# Patient Record
Sex: MALE | Race: WHITE | NOT HISPANIC OR LATINO | URBAN - METROPOLITAN AREA
[De-identification: names, ages, dates, MRNs, and addresses within clinical notes are randomized per-mention and may not be internally consistent; named-entity substitution may affect disease eponyms.]

---

## 2017-09-05 ENCOUNTER — INPATIENT (INPATIENT)
Facility: HOSPITAL | Age: 82
LOS: 3 days | Discharge: HOME | DRG: 65 | End: 2017-09-09
Attending: STUDENT IN AN ORGANIZED HEALTH CARE EDUCATION/TRAINING PROGRAM | Admitting: INTERNAL MEDICINE
Payer: MEDICARE

## 2017-09-05 DIAGNOSIS — R51 HEADACHE: ICD-10-CM

## 2017-09-05 DIAGNOSIS — R29.810 FACIAL WEAKNESS: ICD-10-CM

## 2017-09-05 DIAGNOSIS — I27.2 OTHER SECONDARY PULMONARY HYPERTENSION: ICD-10-CM

## 2017-09-05 DIAGNOSIS — Z87.01 PERSONAL HISTORY OF PNEUMONIA (RECURRENT): ICD-10-CM

## 2017-09-05 DIAGNOSIS — T45.516A UNDERDOSING OF ANTICOAGULANTS, INITIAL ENCOUNTER: ICD-10-CM
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
## 2017-09-05 DIAGNOSIS — I48.1 PERSISTENT ATRIAL FIBRILLATION: ICD-10-CM

## 2017-09-05 DIAGNOSIS — F17.290 NICOTINE DEPENDENCE, OTHER TOBACCO PRODUCT, UNCOMPLICATED: ICD-10-CM

## 2017-09-05 DIAGNOSIS — E87.6 HYPOKALEMIA: ICD-10-CM

## 2017-09-05 DIAGNOSIS — Z91.128 PATIENT'S INTENTIONAL UNDERDOSING OF MEDICATION REGIMEN FOR OTHER REASON: ICD-10-CM

## 2017-09-05 DIAGNOSIS — Z79.01 LONG TERM (CURRENT) USE OF ANTICOAGULANTS: ICD-10-CM

## 2017-09-05 DIAGNOSIS — I16.0 HYPERTENSIVE URGENCY: ICD-10-CM

## 2017-09-05 DIAGNOSIS — I50.32 CHRONIC DIASTOLIC (CONGESTIVE) HEART FAILURE: ICD-10-CM

## 2017-09-05 DIAGNOSIS — I11.0 HYPERTENSIVE HEART DISEASE WITH HEART FAILURE: ICD-10-CM

## 2017-09-05 DIAGNOSIS — R00.1 BRADYCARDIA, UNSPECIFIED: ICD-10-CM

## 2017-09-05 DIAGNOSIS — I63.441 CEREBRAL INFARCTION DUE TO EMBOLISM OF RIGHT CEREBELLAR ARTERY: ICD-10-CM

## 2017-09-05 DIAGNOSIS — R40.2411 GLASGOW COMA SCALE SCORE 13-15, IN THE FIELD [EMT OR AMBULANCE]: ICD-10-CM

## 2017-09-05 DIAGNOSIS — E78.5 HYPERLIPIDEMIA, UNSPECIFIED: ICD-10-CM

## 2017-09-05 DIAGNOSIS — R47.1 DYSARTHRIA AND ANARTHRIA: ICD-10-CM

## 2017-09-05 DIAGNOSIS — R27.0 ATAXIA, UNSPECIFIED: ICD-10-CM

## 2017-09-05 DIAGNOSIS — R29.702 NIHSS SCORE 2: ICD-10-CM

## 2017-09-05 DIAGNOSIS — R45.1 RESTLESSNESS AND AGITATION: ICD-10-CM

## 2017-09-05 PROCEDURE — 74000: CPT | Mod: 26

## 2017-09-05 PROCEDURE — 93010 ELECTROCARDIOGRAM REPORT: CPT

## 2017-09-05 PROCEDURE — 70450 CT HEAD/BRAIN W/O DYE: CPT | Mod: 26

## 2017-09-05 PROCEDURE — 99223 1ST HOSP IP/OBS HIGH 75: CPT

## 2017-09-05 PROCEDURE — 71010: CPT | Mod: 26

## 2017-09-06 PROCEDURE — 70450 CT HEAD/BRAIN W/O DYE: CPT | Mod: 26

## 2017-09-06 PROCEDURE — 93880 EXTRACRANIAL BILAT STUDY: CPT | Mod: 26

## 2017-09-06 PROCEDURE — 99233 SBSQ HOSP IP/OBS HIGH 50: CPT

## 2017-09-06 PROCEDURE — 93306 TTE W/DOPPLER COMPLETE: CPT | Mod: 26

## 2017-09-06 PROCEDURE — 99223 1ST HOSP IP/OBS HIGH 75: CPT

## 2017-09-06 PROCEDURE — 93010 ELECTROCARDIOGRAM REPORT: CPT

## 2017-09-07 PROCEDURE — 99233 SBSQ HOSP IP/OBS HIGH 50: CPT

## 2017-09-07 PROCEDURE — 93010 ELECTROCARDIOGRAM REPORT: CPT

## 2017-09-08 PROCEDURE — 99233 SBSQ HOSP IP/OBS HIGH 50: CPT

## 2017-09-08 PROCEDURE — 99221 1ST HOSP IP/OBS SF/LOW 40: CPT

## 2017-09-09 ENCOUNTER — INPATIENT (INPATIENT)
Facility: HOSPITAL | Age: 82
LOS: 19 days | Discharge: HOME HEALTH | DRG: 950 | End: 2017-09-29
Attending: PSYCHIATRY & NEUROLOGY | Admitting: PSYCHIATRY & NEUROLOGY
Payer: MEDICARE

## 2017-09-09 DIAGNOSIS — I63.9 CEREBRAL INFARCTION, UNSPECIFIED: ICD-10-CM

## 2017-09-09 DIAGNOSIS — I69.390 APRAXIA FOLLOWING CEREBRAL INFARCTION: ICD-10-CM

## 2017-09-09 DIAGNOSIS — G47.00 INSOMNIA, UNSPECIFIED: ICD-10-CM

## 2017-09-09 DIAGNOSIS — R42 DIZZINESS AND GIDDINESS: ICD-10-CM

## 2017-09-09 DIAGNOSIS — R13.10 DYSPHAGIA, UNSPECIFIED: ICD-10-CM

## 2017-09-09 DIAGNOSIS — E78.5 HYPERLIPIDEMIA, UNSPECIFIED: ICD-10-CM

## 2017-09-09 DIAGNOSIS — K13.79 OTHER LESIONS OF ORAL MUCOSA: ICD-10-CM

## 2017-09-09 DIAGNOSIS — E78.00 PURE HYPERCHOLESTEROLEMIA, UNSPECIFIED: ICD-10-CM

## 2017-09-09 DIAGNOSIS — I10 ESSENTIAL (PRIMARY) HYPERTENSION: ICD-10-CM

## 2017-09-09 DIAGNOSIS — Z51.89 ENCOUNTER FOR OTHER SPECIFIED AFTERCARE: ICD-10-CM

## 2017-09-09 DIAGNOSIS — I50.9 HEART FAILURE, UNSPECIFIED: ICD-10-CM

## 2017-09-09 DIAGNOSIS — I69.393 ATAXIA FOLLOWING CEREBRAL INFARCTION: ICD-10-CM

## 2017-09-09 DIAGNOSIS — I69.391 DYSPHAGIA FOLLOWING CEREBRAL INFARCTION: ICD-10-CM

## 2017-09-09 DIAGNOSIS — I69.322 DYSARTHRIA FOLLOWING CEREBRAL INFARCTION: ICD-10-CM

## 2017-09-09 DIAGNOSIS — I48.91 UNSPECIFIED ATRIAL FIBRILLATION: ICD-10-CM

## 2017-09-10 PROCEDURE — 99223 1ST HOSP IP/OBS HIGH 75: CPT | Mod: GC

## 2017-09-11 PROCEDURE — 99232 SBSQ HOSP IP/OBS MODERATE 35: CPT

## 2017-09-12 PROCEDURE — 96116 NUBHVL XM PHYS/QHP 1ST HR: CPT

## 2017-09-12 PROCEDURE — 99232 SBSQ HOSP IP/OBS MODERATE 35: CPT

## 2017-09-13 PROCEDURE — 99232 SBSQ HOSP IP/OBS MODERATE 35: CPT

## 2017-09-14 PROCEDURE — 99232 SBSQ HOSP IP/OBS MODERATE 35: CPT

## 2017-09-14 PROCEDURE — 99223 1ST HOSP IP/OBS HIGH 75: CPT

## 2017-09-15 PROCEDURE — 99232 SBSQ HOSP IP/OBS MODERATE 35: CPT

## 2017-09-16 PROCEDURE — 99232 SBSQ HOSP IP/OBS MODERATE 35: CPT

## 2017-09-17 PROCEDURE — 99232 SBSQ HOSP IP/OBS MODERATE 35: CPT

## 2017-09-18 PROCEDURE — 99232 SBSQ HOSP IP/OBS MODERATE 35: CPT

## 2017-09-19 PROCEDURE — 99232 SBSQ HOSP IP/OBS MODERATE 35: CPT

## 2017-09-20 PROCEDURE — 99232 SBSQ HOSP IP/OBS MODERATE 35: CPT

## 2017-09-21 PROCEDURE — 99232 SBSQ HOSP IP/OBS MODERATE 35: CPT

## 2017-09-22 PROCEDURE — 99232 SBSQ HOSP IP/OBS MODERATE 35: CPT

## 2017-09-23 PROCEDURE — 99232 SBSQ HOSP IP/OBS MODERATE 35: CPT

## 2017-09-24 PROCEDURE — 99232 SBSQ HOSP IP/OBS MODERATE 35: CPT

## 2017-09-25 PROCEDURE — 99232 SBSQ HOSP IP/OBS MODERATE 35: CPT

## 2017-09-26 PROCEDURE — 99232 SBSQ HOSP IP/OBS MODERATE 35: CPT

## 2017-09-27 PROCEDURE — 99232 SBSQ HOSP IP/OBS MODERATE 35: CPT

## 2017-09-28 PROCEDURE — 99232 SBSQ HOSP IP/OBS MODERATE 35: CPT

## 2017-09-29 PROCEDURE — 99239 HOSP IP/OBS DSCHRG MGMT >30: CPT

## 2017-10-04 PROCEDURE — 83735 ASSAY OF MAGNESIUM: CPT

## 2017-10-04 PROCEDURE — G0515: CPT

## 2017-10-04 PROCEDURE — 85610 PROTHROMBIN TIME: CPT

## 2017-10-04 PROCEDURE — 80053 COMPREHEN METABOLIC PANEL: CPT

## 2017-10-04 PROCEDURE — 92507 TX SP LANG VOICE COMM INDIV: CPT

## 2017-10-04 PROCEDURE — 97116 GAIT TRAINING THERAPY: CPT

## 2017-10-04 PROCEDURE — 92610 EVALUATE SWALLOWING FUNCTION: CPT

## 2017-10-04 PROCEDURE — 80076 HEPATIC FUNCTION PANEL: CPT

## 2017-10-04 PROCEDURE — 82248 BILIRUBIN DIRECT: CPT

## 2017-10-04 PROCEDURE — 92523 SPEECH SOUND LANG COMPREHEN: CPT

## 2017-10-04 PROCEDURE — 97112 NEUROMUSCULAR REEDUCATION: CPT

## 2017-10-04 PROCEDURE — 85025 COMPLETE CBC W/AUTO DIFF WBC: CPT

## 2017-10-04 PROCEDURE — 97530 THERAPEUTIC ACTIVITIES: CPT

## 2017-10-04 PROCEDURE — 97535 SELF CARE MNGMENT TRAINING: CPT

## 2017-10-04 PROCEDURE — 97110 THERAPEUTIC EXERCISES: CPT

## 2017-10-04 PROCEDURE — 85027 COMPLETE CBC AUTOMATED: CPT

## 2017-10-04 PROCEDURE — 80048 BASIC METABOLIC PNL TOTAL CA: CPT

## 2017-10-19 PROCEDURE — 80053 COMPREHEN METABOLIC PANEL: CPT

## 2017-10-19 PROCEDURE — 80069 RENAL FUNCTION PANEL: CPT

## 2017-10-19 PROCEDURE — 93306 TTE W/DOPPLER COMPLETE: CPT

## 2017-10-19 PROCEDURE — 71045 X-RAY EXAM CHEST 1 VIEW: CPT

## 2017-10-19 PROCEDURE — 84484 ASSAY OF TROPONIN QUANT: CPT

## 2017-10-19 PROCEDURE — 85027 COMPLETE CBC AUTOMATED: CPT

## 2017-10-19 PROCEDURE — 92526 ORAL FUNCTION THERAPY: CPT

## 2017-10-19 PROCEDURE — 96376 TX/PRO/DX INJ SAME DRUG ADON: CPT

## 2017-10-19 PROCEDURE — 70450 CT HEAD/BRAIN W/O DYE: CPT

## 2017-10-19 PROCEDURE — 83690 ASSAY OF LIPASE: CPT

## 2017-10-19 PROCEDURE — 82150 ASSAY OF AMYLASE: CPT

## 2017-10-19 PROCEDURE — 80061 LIPID PANEL: CPT

## 2017-10-19 PROCEDURE — 96374 THER/PROPH/DIAG INJ IV PUSH: CPT

## 2017-10-19 PROCEDURE — 80048 BASIC METABOLIC PNL TOTAL CA: CPT

## 2017-10-19 PROCEDURE — 92610 EVALUATE SWALLOWING FUNCTION: CPT

## 2017-10-19 PROCEDURE — 83605 ASSAY OF LACTIC ACID: CPT

## 2017-10-19 PROCEDURE — 87040 BLOOD CULTURE FOR BACTERIA: CPT

## 2017-10-19 PROCEDURE — 83880 ASSAY OF NATRIURETIC PEPTIDE: CPT

## 2017-10-19 PROCEDURE — 82553 CREATINE MB FRACTION: CPT

## 2017-10-19 PROCEDURE — 87086 URINE CULTURE/COLONY COUNT: CPT

## 2017-10-19 PROCEDURE — 82550 ASSAY OF CK (CPK): CPT

## 2017-10-19 PROCEDURE — 74018 RADEX ABDOMEN 1 VIEW: CPT

## 2017-10-19 PROCEDURE — 83735 ASSAY OF MAGNESIUM: CPT

## 2017-10-19 PROCEDURE — 93880 EXTRACRANIAL BILAT STUDY: CPT

## 2017-10-19 PROCEDURE — 85025 COMPLETE CBC W/AUTO DIFF WBC: CPT

## 2017-10-19 PROCEDURE — 97162 PT EVAL MOD COMPLEX 30 MIN: CPT

## 2017-10-19 PROCEDURE — 97116 GAIT TRAINING THERAPY: CPT

## 2017-10-19 PROCEDURE — 81003 URINALYSIS AUTO W/O SCOPE: CPT

## 2017-10-19 PROCEDURE — 99285 EMERGENCY DEPT VISIT HI MDM: CPT

## 2017-10-19 PROCEDURE — 96375 TX/PRO/DX INJ NEW DRUG ADDON: CPT

## 2017-10-19 PROCEDURE — 85610 PROTHROMBIN TIME: CPT

## 2017-10-19 PROCEDURE — 85730 THROMBOPLASTIN TIME PARTIAL: CPT

## 2017-10-19 PROCEDURE — 93005 ELECTROCARDIOGRAM TRACING: CPT

## 2017-11-03 ENCOUNTER — APPOINTMENT (OUTPATIENT)
Dept: CT IMAGING | Facility: HOSPITAL | Age: 82
End: 2017-11-03

## 2017-11-03 ENCOUNTER — OUTPATIENT (OUTPATIENT)
Dept: OUTPATIENT SERVICES | Facility: HOSPITAL | Age: 82
LOS: 1 days | End: 2017-11-03
Payer: MEDICARE

## 2017-11-03 DIAGNOSIS — I63.9 CEREBRAL INFARCTION, UNSPECIFIED: ICD-10-CM

## 2017-11-03 DIAGNOSIS — Z00.8 ENCOUNTER FOR OTHER GENERAL EXAMINATION: ICD-10-CM

## 2017-11-03 PROCEDURE — 70450 CT HEAD/BRAIN W/O DYE: CPT

## 2017-11-03 PROCEDURE — 70450 CT HEAD/BRAIN W/O DYE: CPT | Mod: 26

## 2017-11-09 ENCOUNTER — APPOINTMENT (OUTPATIENT)
Dept: INTERNAL MEDICINE | Facility: CLINIC | Age: 82
End: 2017-11-09

## 2018-08-20 ENCOUNTER — INPATIENT (INPATIENT)
Facility: HOSPITAL | Age: 83
LOS: 1 days | Discharge: HOME | DRG: 280 | End: 2018-08-22
Attending: STUDENT IN AN ORGANIZED HEALTH CARE EDUCATION/TRAINING PROGRAM | Admitting: FAMILY MEDICINE
Payer: MEDICARE

## 2018-08-20 VITALS
HEART RATE: 78 BPM | SYSTOLIC BLOOD PRESSURE: 193 MMHG | WEIGHT: 175.05 LBS | TEMPERATURE: 98 F | DIASTOLIC BLOOD PRESSURE: 80 MMHG | HEIGHT: 67 IN | RESPIRATION RATE: 17 BRPM | OXYGEN SATURATION: 96 %

## 2018-08-20 DIAGNOSIS — I50.9 HEART FAILURE, UNSPECIFIED: ICD-10-CM

## 2018-08-20 DIAGNOSIS — R05 COUGH: ICD-10-CM

## 2018-08-20 DIAGNOSIS — R74.8 ABNORMAL LEVELS OF OTHER SERUM ENZYMES: ICD-10-CM

## 2018-08-20 DIAGNOSIS — I48.2 CHRONIC ATRIAL FIBRILLATION: ICD-10-CM

## 2018-08-20 DIAGNOSIS — J18.9 PNEUMONIA, UNSPECIFIED ORGANISM: ICD-10-CM

## 2018-08-20 DIAGNOSIS — I10 ESSENTIAL (PRIMARY) HYPERTENSION: ICD-10-CM

## 2018-08-20 DIAGNOSIS — I63.9 CEREBRAL INFARCTION, UNSPECIFIED: ICD-10-CM

## 2018-08-20 LAB
ALBUMIN SERPL ELPH-MCNC: 3.4 G/DL — SIGNIFICANT CHANGE UP (ref 3.3–5)
ALP SERPL-CCNC: 51 U/L — SIGNIFICANT CHANGE UP (ref 40–120)
ALT FLD-CCNC: 22 U/L DA — SIGNIFICANT CHANGE UP (ref 10–45)
ANION GAP SERPL CALC-SCNC: 7 MMOL/L — SIGNIFICANT CHANGE UP (ref 5–17)
APTT BLD: 29.3 SEC — SIGNIFICANT CHANGE UP (ref 27.5–37.4)
AST SERPL-CCNC: 21 U/L — SIGNIFICANT CHANGE UP (ref 10–40)
BILIRUB SERPL-MCNC: 0.7 MG/DL — SIGNIFICANT CHANGE UP (ref 0.2–1.2)
BUN SERPL-MCNC: 16 MG/DL — SIGNIFICANT CHANGE UP (ref 7–23)
CALCIUM SERPL-MCNC: 8.9 MG/DL — SIGNIFICANT CHANGE UP (ref 8.4–10.5)
CHLORIDE SERPL-SCNC: 106 MMOL/L — SIGNIFICANT CHANGE UP (ref 96–108)
CK MB BLD-MCNC: 2 % — SIGNIFICANT CHANGE UP (ref 0–3.5)
CK MB CFR SERPL CALC: 1 NG/ML — SIGNIFICANT CHANGE UP (ref 0.5–10)
CK SERPL-CCNC: 51 U/L — SIGNIFICANT CHANGE UP (ref 30–200)
CO2 SERPL-SCNC: 27 MMOL/L — SIGNIFICANT CHANGE UP (ref 22–31)
CREAT SERPL-MCNC: 0.92 MG/DL — SIGNIFICANT CHANGE UP (ref 0.5–1.3)
D DIMER BLD IA.RAPID-MCNC: 920 NG/ML DDU — HIGH
ETHANOL SERPL-MCNC: <3 MG/DL — SIGNIFICANT CHANGE UP (ref 0–3)
GLUCOSE SERPL-MCNC: 190 MG/DL — HIGH (ref 70–99)
HCT VFR BLD CALC: 42.4 % — SIGNIFICANT CHANGE UP (ref 39–50)
HGB BLD-MCNC: 13.8 G/DL — SIGNIFICANT CHANGE UP (ref 13–17)
INR BLD: 1.09 RATIO — SIGNIFICANT CHANGE UP (ref 0.88–1.16)
MCHC RBC-ENTMCNC: 32.2 PG — SIGNIFICANT CHANGE UP (ref 27–34)
MCHC RBC-ENTMCNC: 32.6 GM/DL — SIGNIFICANT CHANGE UP (ref 32–36)
MCV RBC AUTO: 98.7 FL — SIGNIFICANT CHANGE UP (ref 80–100)
NT-PROBNP SERPL-SCNC: 1627 PG/ML — HIGH (ref 0–300)
PLATELET # BLD AUTO: 170 K/UL — SIGNIFICANT CHANGE UP (ref 150–400)
POTASSIUM SERPL-MCNC: 3.7 MMOL/L — SIGNIFICANT CHANGE UP (ref 3.5–5.3)
POTASSIUM SERPL-SCNC: 3.7 MMOL/L — SIGNIFICANT CHANGE UP (ref 3.5–5.3)
PROT SERPL-MCNC: 7.1 G/DL — SIGNIFICANT CHANGE UP (ref 6–8.3)
PROTHROM AB SERPL-ACNC: 12.1 SEC — SIGNIFICANT CHANGE UP (ref 9.8–12.7)
RBC # BLD: 4.3 M/UL — SIGNIFICANT CHANGE UP (ref 4.2–5.8)
RBC # FLD: 13.6 % — SIGNIFICANT CHANGE UP (ref 10.3–14.5)
SODIUM SERPL-SCNC: 140 MMOL/L — SIGNIFICANT CHANGE UP (ref 135–145)
TROPONIN I SERPL-MCNC: 0.1 NG/ML — HIGH (ref 0.02–0.06)
WBC # BLD: 11.2 K/UL — HIGH (ref 3.8–10.5)
WBC # FLD AUTO: 11.2 K/UL — HIGH (ref 3.8–10.5)

## 2018-08-20 PROCEDURE — 99285 EMERGENCY DEPT VISIT HI MDM: CPT

## 2018-08-20 PROCEDURE — 99223 1ST HOSP IP/OBS HIGH 75: CPT | Mod: AI

## 2018-08-20 PROCEDURE — 93010 ELECTROCARDIOGRAM REPORT: CPT

## 2018-08-20 PROCEDURE — 71275 CT ANGIOGRAPHY CHEST: CPT | Mod: 26

## 2018-08-20 PROCEDURE — 71045 X-RAY EXAM CHEST 1 VIEW: CPT | Mod: 26

## 2018-08-20 RX ORDER — ACETAMINOPHEN 500 MG
650 TABLET ORAL EVERY 6 HOURS
Qty: 0 | Refills: 0 | Status: DISCONTINUED | OUTPATIENT
Start: 2018-08-20 | End: 2018-08-22

## 2018-08-20 RX ORDER — CEFTRIAXONE 500 MG/1
1 INJECTION, POWDER, FOR SOLUTION INTRAMUSCULAR; INTRAVENOUS EVERY 24 HOURS
Qty: 0 | Refills: 0 | Status: DISCONTINUED | OUTPATIENT
Start: 2018-08-20 | End: 2018-08-22

## 2018-08-20 RX ORDER — IPRATROPIUM/ALBUTEROL SULFATE 18-103MCG
3 AEROSOL WITH ADAPTER (GRAM) INHALATION ONCE
Qty: 0 | Refills: 0 | Status: COMPLETED | OUTPATIENT
Start: 2018-08-20 | End: 2018-08-20

## 2018-08-20 RX ORDER — LISINOPRIL 2.5 MG/1
5 TABLET ORAL DAILY
Qty: 0 | Refills: 0 | Status: DISCONTINUED | OUTPATIENT
Start: 2018-08-20 | End: 2018-08-22

## 2018-08-20 RX ORDER — ASPIRIN/CALCIUM CARB/MAGNESIUM 324 MG
81 TABLET ORAL DAILY
Qty: 0 | Refills: 0 | Status: DISCONTINUED | OUTPATIENT
Start: 2018-08-20 | End: 2018-08-22

## 2018-08-20 RX ORDER — FUROSEMIDE 40 MG
40 TABLET ORAL ONCE
Qty: 0 | Refills: 0 | Status: COMPLETED | OUTPATIENT
Start: 2018-08-20 | End: 2018-08-20

## 2018-08-20 RX ORDER — AZITHROMYCIN 500 MG/1
500 TABLET, FILM COATED ORAL EVERY 24 HOURS
Qty: 0 | Refills: 0 | Status: DISCONTINUED | OUTPATIENT
Start: 2018-08-20 | End: 2018-08-22

## 2018-08-20 RX ORDER — BUDESONIDE, MICRONIZED 100 %
0.5 POWDER (GRAM) MISCELLANEOUS EVERY 12 HOURS
Qty: 0 | Refills: 0 | Status: DISCONTINUED | OUTPATIENT
Start: 2018-08-20 | End: 2018-08-22

## 2018-08-20 RX ORDER — ISOSORBIDE DINITRATE 5 MG/1
20 TABLET ORAL THREE TIMES A DAY
Qty: 0 | Refills: 0 | Status: DISCONTINUED | OUTPATIENT
Start: 2018-08-20 | End: 2018-08-22

## 2018-08-20 RX ORDER — FUROSEMIDE 40 MG
40 TABLET ORAL DAILY
Qty: 0 | Refills: 0 | Status: DISCONTINUED | OUTPATIENT
Start: 2018-08-20 | End: 2018-08-22

## 2018-08-20 RX ORDER — ALBUTEROL 90 UG/1
2.5 AEROSOL, METERED ORAL ONCE
Qty: 0 | Refills: 0 | Status: COMPLETED | OUTPATIENT
Start: 2018-08-20 | End: 2018-08-20

## 2018-08-20 RX ORDER — APIXABAN 2.5 MG/1
5 TABLET, FILM COATED ORAL EVERY 12 HOURS
Qty: 0 | Refills: 0 | Status: DISCONTINUED | OUTPATIENT
Start: 2018-08-20 | End: 2018-08-22

## 2018-08-20 RX ADMIN — Medication 40 MILLIGRAM(S): at 20:30

## 2018-08-20 RX ADMIN — ALBUTEROL 2.5 MILLIGRAM(S): 90 AEROSOL, METERED ORAL at 19:00

## 2018-08-20 RX ADMIN — Medication 3 MILLILITER(S): at 19:00

## 2018-08-20 NOTE — ED PROVIDER NOTE - NS ED ROS FT
Except as otherwise indicated in HPI:  CONSTITUTIONAL: Neg  HEENT: neg  CV: no cp, no palp  Resp: +sob, +cough, no resp distress  GI: Neg  : Neg  MSK: +leg swelling  SKIN: Neg  NEURO: Neg  PSYCHIATRIC: Neg

## 2018-08-20 NOTE — ED ADULT NURSE NOTE - NSIMPLEMENTINTERV_GEN_ALL_ED
Implemented All Fall Risk Interventions:  [place] to call system. Call bell, personal items and telephone within reach. Instruct patient to call for assistance. Room bathroom lighting operational. Non-slip footwear when patient is off stretcher. Physically safe environment: no spills, clutter or unnecessary equipment. Stretcher in lowest position, wheels locked, appropriate side rails in place. Provide visual cue, wrist band, yellow gown, etc. Monitor gait and stability. Monitor for mental status changes and reorient to person, place, and time. Review medications for side effects contributing to fall risk. Reinforce activity limits and safety measures with patient and family.

## 2018-08-20 NOTE — H&P ADULT - PROBLEM SELECTOR PLAN 7
bilateral basilar opacity, ? ATYPICAL PNA, OR  distal impaction of mucus- will treat imperially for pna, with IV antibiotics, AZITHROMICIN/ ROCEPHINE.

## 2018-08-20 NOTE — ED ADULT NURSE NOTE - OBJECTIVE STATEMENT
84 yr old male to ED with SOB, and productive cough with white sputum x wks. Pt with hx-CVA . +Residual left sided weakness and slurred speech. As per family pt has been intubated in the past.  PERRL @ 3mm. A&Ox3, +Rhonchi noted with auscultation.  Resp non-labored. Abd soft, NT, ND. +BS. +PP. RAMOS. Skin warm, dry and intact 84 yr old male to ED with SOB, and productive cough with white sputum x wks. Pt with hx-CVA . +Residual left sided weakness and slurred speech. As per family pt has been intubated in the past.  Denies Fevers or chills. No chest pain. No edema noted. PERRL @ 3mm. A&Ox3, +Rhonchi noted with auscultation.  Resp non-labored. Abd soft, NT, ND. +BS. +PP. RAMOS. Skin warm, dry and intact 84 yr old male to ED with SOB, and productive cough with white sputum x wks. Pt with hx-CVA . +Residual left sided weakness and slurred speech. As per family pt has been intubated in the past.  Denies Fevers or chills. No chest pain. No edema noted. PERRL @ 3mm. A&Ox3, +crackles noted with auscultation.  Resp non-labored. Abd soft, NT, ND. +BS. +PP. RAMOS. Skin warm, dry and intact

## 2018-08-20 NOTE — ED PROVIDER NOTE - OBJECTIVE STATEMENT
Pt with hx cva (mild chronic left sided weakness and speech slurred) Pt with hx cva (mild chronic left sided weakness and speech slurred), chf, afib. Pt c/o 1 week hx sob, cough with white sputum, no fever no chills. no cp.  no back pain. has leg swelling, states slightly increased from baseline. No abd pain nausea or emesis.

## 2018-08-20 NOTE — H&P ADULT - HISTORY OF PRESENT ILLNESS
84 y o m Pt with hx cva (mild chronic left sided weakness and speech slurred), chf, afib. Pt c/o 1 week hx sob, cough with white sputum, no fever no chills. no cp.  no back pain. has leg swelling, states slightly increased from baseline. No abd pain nausea. noted to have mild elevation in trops, 84 y o m Pt with hx cva , chf, afib. Pt c/o 1 week hx sob, cough with white sputum, sob has been worsening for the last 1- 2 weeks, aggravated by exertion,  no fever no chills. no cp.  no back pain. has leg swelling, states slightly increased from baseline. No abd pain nausea. noted to have mild elevation in trops,

## 2018-08-20 NOTE — H&P ADULT - PROBLEM SELECTOR PLAN 1
admit to tele, cardiac monitoring, serial trops,   c/w lasix ivp, o2 support, dvt ppx, ed echo in am.

## 2018-08-20 NOTE — H&P ADULT - NSHPLABSRESULTS_GEN_ALL_CORE
x                    140  | 27   | 16           x     >-----------< x       ------------------------< 190                   x                    3.7  | 106  | 0.92                                         Ca 8.9   Mg x     Ph x            PT/INR - ( 20 Aug 2018 19:15 )   PT: 12.1 sec;   INR: 1.09 ratio         PTT - ( 20 Aug 2018 19:15 )  PTT:29.3 sec    CARDIAC MARKERS ( 20 Aug 2018 19:15 )  CK51 U/L / CKMB1.0 ng/mL / Troponin - 0.09    Labs reviewed:     CXR personally reviewed: pulmonary vascular congestion    ECG reviewed and interpreted: afib 72    CTA chest- pulmonary vascular congestion, pleural effusions, bilateral basilar opacity, ? ATYPICAL PNA, OR  distal impaction of mucus , no OBVIOUS PE, D/W RADIOLOGY

## 2018-08-20 NOTE — H&P ADULT - PMH
Afib    Congestive heart failure (CHF)    CVA (cerebral vascular accident)    Hypertension, unspecified type

## 2018-08-20 NOTE — H&P ADULT - NSHPPHYSICALEXAM_GEN_ALL_CORE
Vital Signs Last 24 Hrs  T(C): 36.8 (20 Aug 2018 18:41), Max: 36.8 (20 Aug 2018 18:41)  T(F): 98.3 (20 Aug 2018 18:41), Max: 98.3 (20 Aug 2018 18:41)  HR: 80 (20 Aug 2018 21:40) (78 - 81)  BP: 183/73 (20 Aug 2018 21:40) (179/68 - 193/80)  BP(mean): --  RR: 22 (20 Aug 2018 21:40) (17 - 24)  SpO2: 96% (20 Aug 2018 20:08) (96% - 96%)    coughing spells, ncat, rocco, mmm  supple  rales atr base  s1s2  sof alec bs present  mild LE edema  no gross neuro defixcits  aao x 3 Vital Signs Last 24 Hrs  T(C): 36.8 (20 Aug 2018 18:41), Max: 36.8 (20 Aug 2018 18:41)  T(F): 98.3 (20 Aug 2018 18:41), Max: 98.3 (20 Aug 2018 18:41)  HR: 80 (20 Aug 2018 21:40) (78 - 81)  BP: 183/73 (20 Aug 2018 21:40) (179/68 - 193/80)  BP(mean): --  RR: 22 (20 Aug 2018 21:40) (17 - 24)  SpO2: 96% (20 Aug 2018 20:08) (96% - 96%)    coughing spells, ncat, rocco, mmm  supple  rales atr base  s1s2  sof alec bs present  mild LE edema  slurred speech, no new  deficits  aao x 3

## 2018-08-20 NOTE — ED PROVIDER NOTE - PHYSICAL EXAMINATION
Gen:  alert, awake, no acute distress  Head:  atraumatic, normocephalic  HEENT: PERRLA, EOMI, normal nose, normal oropharynx, no tonsillar edema, erythema, or exudate  CV:  rrr, nl S1, S2, +systolic murmur  Pulm:  crackles 1/3 up b/l bs  Abd: s/nt/nd, +BS  MSK:  moving all extremities, no back midline ttp, no stepoffs, no cva TTP; 1-2+ symmetric b/l lower extremity edema  Neuro:  grossly intact, no focal deficits  Skin:  clear, dry, intact  Psych: AOx3, normal affect, no apparent risk to self or others Gen:  alert, awake, no acute distress  Head:  atraumatic, normocephalic  HEENT: PERRLA, EOMI, normal nose, normal oropharynx, no tonsillar edema, erythema, or exudate  CV:  irregularly irregular  Pulm:  crackles 1/3 up b/l bs  Abd: s/nt/nd, +BS  MSK:  moving all extremities, no back midline ttp, no stepoffs, no cva TTP; 1-2+ symmetric b/l lower extremity edema  Neuro:  grossly intact, no focal deficits  Skin:  clear, dry, intact  Psych: AOx3, normal affect, no apparent risk to self or others

## 2018-08-21 LAB
ALBUMIN SERPL ELPH-MCNC: 3.3 G/DL — SIGNIFICANT CHANGE UP (ref 3.3–5)
ALP SERPL-CCNC: 50 U/L — SIGNIFICANT CHANGE UP (ref 40–120)
ALT FLD-CCNC: 18 U/L DA — SIGNIFICANT CHANGE UP (ref 10–45)
ANION GAP SERPL CALC-SCNC: 6 MMOL/L — SIGNIFICANT CHANGE UP (ref 5–17)
AST SERPL-CCNC: 20 U/L — SIGNIFICANT CHANGE UP (ref 10–40)
BASOPHILS # BLD AUTO: 0.1 K/UL — SIGNIFICANT CHANGE UP (ref 0–0.2)
BASOPHILS NFR BLD AUTO: 0.6 % — SIGNIFICANT CHANGE UP (ref 0–2)
BILIRUB SERPL-MCNC: 0.5 MG/DL — SIGNIFICANT CHANGE UP (ref 0.2–1.2)
BUN SERPL-MCNC: 20 MG/DL — SIGNIFICANT CHANGE UP (ref 7–23)
CALCIUM SERPL-MCNC: 8.6 MG/DL — SIGNIFICANT CHANGE UP (ref 8.4–10.5)
CHLORIDE SERPL-SCNC: 101 MMOL/L — SIGNIFICANT CHANGE UP (ref 96–108)
CO2 SERPL-SCNC: 33 MMOL/L — HIGH (ref 22–31)
CREAT SERPL-MCNC: 1.13 MG/DL — SIGNIFICANT CHANGE UP (ref 0.5–1.3)
EOSINOPHIL # BLD AUTO: 0.7 K/UL — HIGH (ref 0–0.5)
EOSINOPHIL NFR BLD AUTO: 6.2 % — HIGH (ref 0–6)
GLUCOSE SERPL-MCNC: 100 MG/DL — HIGH (ref 70–99)
HCT VFR BLD CALC: 42.4 % — SIGNIFICANT CHANGE UP (ref 39–50)
HGB BLD-MCNC: 13.9 G/DL — SIGNIFICANT CHANGE UP (ref 13–17)
LYMPHOCYTES # BLD AUTO: 1.9 K/UL — SIGNIFICANT CHANGE UP (ref 1–3.3)
LYMPHOCYTES # BLD AUTO: 16.4 % — SIGNIFICANT CHANGE UP (ref 13–44)
MCHC RBC-ENTMCNC: 32.6 PG — SIGNIFICANT CHANGE UP (ref 27–34)
MCHC RBC-ENTMCNC: 32.8 GM/DL — SIGNIFICANT CHANGE UP (ref 32–36)
MCV RBC AUTO: 99.5 FL — SIGNIFICANT CHANGE UP (ref 80–100)
MONOCYTES # BLD AUTO: 1.2 K/UL — HIGH (ref 0–0.9)
MONOCYTES NFR BLD AUTO: 10.5 % — HIGH (ref 1–9)
NEUTROPHILS # BLD AUTO: 7.5 K/UL — HIGH (ref 1.8–7.4)
NEUTROPHILS NFR BLD AUTO: 66.3 % — SIGNIFICANT CHANGE UP (ref 43–77)
NT-PROBNP SERPL-SCNC: 1306 PG/ML — HIGH (ref 0–300)
PLATELET # BLD AUTO: 164 K/UL — SIGNIFICANT CHANGE UP (ref 150–400)
POTASSIUM SERPL-MCNC: 3.6 MMOL/L — SIGNIFICANT CHANGE UP (ref 3.5–5.3)
POTASSIUM SERPL-SCNC: 3.6 MMOL/L — SIGNIFICANT CHANGE UP (ref 3.5–5.3)
PROT SERPL-MCNC: 7 G/DL — SIGNIFICANT CHANGE UP (ref 6–8.3)
RBC # BLD: 4.26 M/UL — SIGNIFICANT CHANGE UP (ref 4.2–5.8)
RBC # FLD: 13.2 % — SIGNIFICANT CHANGE UP (ref 10.3–14.5)
SODIUM SERPL-SCNC: 140 MMOL/L — SIGNIFICANT CHANGE UP (ref 135–145)
TROPONIN I SERPL-MCNC: 0.09 NG/ML — HIGH (ref 0.02–0.06)
TROPONIN I SERPL-MCNC: 0.11 NG/ML — HIGH (ref 0.02–0.06)
TROPONIN I SERPL-MCNC: 0.12 NG/ML — HIGH (ref 0.02–0.06)
WBC # BLD: 11.4 K/UL — HIGH (ref 3.8–10.5)
WBC # FLD AUTO: 11.4 K/UL — HIGH (ref 3.8–10.5)

## 2018-08-21 PROCEDURE — 74018 RADEX ABDOMEN 1 VIEW: CPT | Mod: 26

## 2018-08-21 PROCEDURE — 93306 TTE W/DOPPLER COMPLETE: CPT | Mod: 26

## 2018-08-21 PROCEDURE — 99233 SBSQ HOSP IP/OBS HIGH 50: CPT

## 2018-08-21 RX ADMIN — ISOSORBIDE DINITRATE 20 MILLIGRAM(S): 5 TABLET ORAL at 21:29

## 2018-08-21 RX ADMIN — LISINOPRIL 5 MILLIGRAM(S): 2.5 TABLET ORAL at 00:09

## 2018-08-21 RX ADMIN — CEFTRIAXONE 100 GRAM(S): 500 INJECTION, POWDER, FOR SOLUTION INTRAMUSCULAR; INTRAVENOUS at 05:15

## 2018-08-21 RX ADMIN — Medication 2.5 MILLIGRAM(S): at 00:05

## 2018-08-21 RX ADMIN — Medication 81 MILLIGRAM(S): at 12:56

## 2018-08-21 RX ADMIN — AZITHROMYCIN 255 MILLIGRAM(S): 500 TABLET, FILM COATED ORAL at 06:45

## 2018-08-21 RX ADMIN — ISOSORBIDE DINITRATE 20 MILLIGRAM(S): 5 TABLET ORAL at 14:07

## 2018-08-21 RX ADMIN — APIXABAN 5 MILLIGRAM(S): 2.5 TABLET, FILM COATED ORAL at 05:15

## 2018-08-21 RX ADMIN — LISINOPRIL 5 MILLIGRAM(S): 2.5 TABLET ORAL at 05:15

## 2018-08-21 RX ADMIN — Medication 0.5 MILLIGRAM(S): at 16:17

## 2018-08-21 RX ADMIN — ISOSORBIDE DINITRATE 20 MILLIGRAM(S): 5 TABLET ORAL at 00:09

## 2018-08-21 RX ADMIN — ISOSORBIDE DINITRATE 20 MILLIGRAM(S): 5 TABLET ORAL at 05:15

## 2018-08-21 RX ADMIN — APIXABAN 5 MILLIGRAM(S): 2.5 TABLET, FILM COATED ORAL at 00:09

## 2018-08-21 RX ADMIN — Medication 40 MILLIGRAM(S): at 05:15

## 2018-08-21 RX ADMIN — APIXABAN 5 MILLIGRAM(S): 2.5 TABLET, FILM COATED ORAL at 18:19

## 2018-08-21 NOTE — PROGRESS NOTE ADULT - SUBJECTIVE AND OBJECTIVE BOX
Patient is a 84 year old  Male who presents with a chief complaint of shortness of breath. (20 Aug 2018 21:54)    Patient with hx cva, chf, afib, c/o 1 week hx sob, cough with white sputum. Sob has been worsening for the last 1- 2 weeks, aggravated by exertion. Denies chest pain, palapitations    MEDICATIONS  (STANDING):  apixaban 5 milliGRAM(s) Oral every 12 hours  aspirin enteric coated 81 milliGRAM(s) Oral daily  azithromycin  IVPB 500 milliGRAM(s) IV Intermittent every 24 hours  buDESOnide   0.5 milliGRAM(s) Respule 0.5 milliGRAM(s) Inhalation every 12 hours  cefTRIAXone   IVPB 1 Gram(s) IV Intermittent every 24 hours  furosemide   Injectable 40 milliGRAM(s) IV Push daily  isosorbide   dinitrate Tablet (ISORDIL) 20 milliGRAM(s) Oral three times a day  lisinopril 5 milliGRAM(s) Oral daily    MEDICATIONS  (PRN):  acetaminophen   Tablet 650 milliGRAM(s) Oral every 6 hours PRN For Temp greater than 38 C (100.4 F)      REVIEW OF SYSTEMS:  CONSTITUTIONAL: No fever, weight loss. Has fatigue  EYES: No eye pain, visual disturbances, or discharge  ENMT:  No difficulty hearing, tinnitus, vertigo; No sinus or throat pain  NECK: No pain or stiffness  RESPIRATORY: No cough, wheezing, chills or hemoptysis; intermittent shortness of breath  CARDIOVASCULAR: No chest pain, palpitations, dizziness, or leg swelling  GASTROINTESTINAL: No abdominal or epigastric pain. No nausea, vomiting, or hematemesis; No diarrhea or constipation. No melena or hematochezia.  GENITOURINARY: No dysuria, frequency, hematuria, or incontinence  NEUROLOGICAL: No headaches, memory loss, loss of strength, numbness, or tremors  SKIN: No itching, burning, rashes, or lesions   LYMPH NODES: No enlarged glands  ENDOCRINE: No heat or cold intolerance; No hair loss  MUSCULOSKELETAL: No joint pain or swelling; No muscle, back, or extremity pain  PSYCHIATRIC: No depression, anxiety, mood swings, or difficulty sleeping  HEME/LYMPH: No easy bruising, or bleeding gums  ALLERY AND IMMUNOLOGIC: No hives or eczema    PHYSICAL EXAM:  T(C): 36.7 (08-21-18 @ 16:00), Max: 36.7 (08-21-18 @ 04:55)  HR: 72 (08-21-18 @ 16:00) (72 - 81)  BP: 125/52 (08-21-18 @ 16:00) (104/67 - 183/73)  RR: 16 (08-21-18 @ 16:00) (16 - 24)  SpO2: 97% (08-21-18 @ 16:00) (93% - 97%)    I&O's Summary    20 Aug 2018 07:01  -  21 Aug 2018 07:00  --------------------------------------------------------  IN: 0 mL / OUT: 1400 mL / NET: -1400 mL    21 Aug 2018 07:01  -  21 Aug 2018 20:07  --------------------------------------------------------  IN: 480 mL / OUT: 750 mL / NET: -270 mL        GENERAL: NAD, well-groomed, well-developed  HEAD:  Atraumatic, Normocephalic  EYES: EOMI, PERRL, conjunctiva and sclera clear  ENMT: Moist mucous membranes  NECK: Supple, No JVD, Normal thyroid  NERVOUS SYSTEM:  Alert & Oriented X3  CHEST/LUNG:  rales at bases, no rhonchi, wheezing, or rubs  HEART: Regular rate and rhythm; No murmurs, rubs, or gallops  ABDOMEN: Soft, Nontender, Nondistended; Bowel sounds present  EXTREMITIES:  2+ Peripheral Pulses, No clubbing, cyanosis, or edema  LYMPH: No lymphadenopathy noted  SKIN: No rashes or lesions          LABS:                        13.9   11.4  )-----------( 164      ( 21 Aug 2018 15:27 )             42.4     08-21    140  |  101  |  20  ----------------------------<  100<H>  3.6   |  33<H>  |  1.13    Ca    8.6      21 Aug 2018 15:27    TPro  7.0  /  Alb  3.3  /  TBili  0.5  /  DBili  x   /  AST  20  /  ALT  18  /  AlkPhos  50  08-21    PT/INR - ( 20 Aug 2018 19:15 )   PT: 12.1 sec;   INR: 1.09 ratio         PTT - ( 20 Aug 2018 19:15 )  PTT:29.3 sec      RADIOLOGY & ADDITIONAL TESTS:    Imaging Personally Reviewed:  [x] YES  [ ] NO    Consultant(s) Notes Reviewed:  [x] YES  [ ] NO    Care Discussed with Consultants/Other Providers [x] YES  [ ] NO

## 2018-08-21 NOTE — CONSULT NOTE ADULT - ATTENDING COMMENTS
1. cardiomyopathy  care is coordinated with office of dr marybel rollins. [phone]. patient has a known cardiomyopathy. would consider for ACE ARB and long acting beta blocker. echo performed close outpatient follow up.     2. acute on chronic systolic and diastolic heart failure  daily weights CHF recommendations.    3. elevation in troponin  is of unclear significance absence ACS symptoms such as chest pains. may reflect CHF. will review prior workup with dr rollins. 1. cardiomyopathy  care is coordinated with office of dr marybel rollins. [phone]. patient has a known cardiomyopathy. would consider for ACE ARB and long acting beta blocker. echo performed close outpatient follow up.     2. acute on chronic systolic and diastolic heart failure  daily weights CHF recommendations.    3. elevation in troponin  is of unclear significance absence ACS symptoms such as chest pains. may reflect CHF. will review prior workup with dr rollins.     addendum  review of prior testing is noted for an EF of 49-50 and the current value represents a distinct decline in EF. concern is also raised regarding a prior history of cath 3/14 with moderate 50-60% disease of the proximal segment . He had declined a cath with dr sathya us at [facility] .call placed to daughter await callback. would consider patient for cath if willing to accept the inherent risks. 1. cardiomyopathy  care is coordinated with office of dr marybel rollins. [phone]. patient has a known cardiomyopathy. would consider for ACE ARB and long acting beta blocker. echo performed close outpatient follow up.     2. acute on chronic systolic and diastolic heart failure  daily weights CHF recommendations.    3. elevation in troponin  is of unclear significance absence ACS symptoms such as chest pains. may reflect CHF. will review prior workup with dr rollins.     addendum  review of prior testing is noted for an EF of 49-50 and the current value represents a distinct decline in EF. concern is also raised regarding a prior history of cath 3/14 with moderate 50-60% disease of the proximal segment . He had declined a cath with dr sathya us at [facility] .call placed to daughter await callback. would consider patient for cath if willing to accept the inherent risks.    second addendum  care discussed with daughter. patient refusing angiogram. will offer pharmacologic stress test ad proceed if patient accepts inherent risks. pros and cons discussed with patient daughter and nephew. dr velasco currently "away."

## 2018-08-21 NOTE — PROGRESS NOTE ADULT - PROBLEM SELECTOR PLAN 7
bilateral basilar opacity, ?ATYPICAL PNA, or distal impaction of mucus- will treat empirially for pna, with IV antibiotics

## 2018-08-21 NOTE — CONSULT NOTE ADULT - SUBJECTIVE AND OBJECTIVE BOX
Chief Complaint:     84 y o m Pt with hx cva , chf, afib. Pt c/o 1 week hx sob, cough with white sputum, sob has been worsening for the last 1- 2 weeks, aggravated by exertion,  no fever no chills. no cp.  no back pain. has leg swelling, states slightly increased from baseline. No abd pain nausea. noted to have mild elevation in trops,     HPI:    PMH:   Hypertension, unspecified type  CVA (cerebral vascular accident)  Congestive heart failure (CHF)  Afib    PSH:   No significant past surgical history    Family History:  FAMILY HISTORY:  No pertinent family history in first degree relatives      Social History:  Smoking:  Alcohol:  Drugs:    Allergies:  No Known Allergies      Medications:  acetaminophen   Tablet 650 milliGRAM(s) Oral every 6 hours PRN  apixaban 5 milliGRAM(s) Oral every 12 hours  aspirin enteric coated 81 milliGRAM(s) Oral daily  azithromycin  IVPB 500 milliGRAM(s) IV Intermittent every 24 hours  buDESOnide   0.5 milliGRAM(s) Respule 0.5 milliGRAM(s) Inhalation every 12 hours  cefTRIAXone   IVPB 1 Gram(s) IV Intermittent every 24 hours  furosemide   Injectable 40 milliGRAM(s) IV Push daily  isosorbide   dinitrate Tablet (ISORDIL) 20 milliGRAM(s) Oral three times a day  lisinopril 5 milliGRAM(s) Oral daily      REVIEW OF SYSTEMS:  CONSTITUTIONAL: No fever, weight loss, or fatigue  EYES: No eye pain, visual disturbances, or discharge  ENMT:  No difficulty hearing, tinnitus, vertigo; No sinus or throat pain  NECK: No pain or stiffness  BREASTS: No pain, masses, or nipple discharge  RESPIRATORY: No cough, wheezing, chills or hemoptysis; No shortness of breath  CARDIOVASCULAR: No chest pain, palpitations, dizziness, or leg swelling  GASTROINTESTINAL: No abdominal or epigastric pain. No nausea, vomiting, or hematemesis; No diarrhea or constipation. No melena or hematochezia.  GENITOURINARY: No dysuria, frequency, hematuria, or incontinence  NEUROLOGICAL: No headaches, memory loss, loss of strength, numbness, or tremors  SKIN: No itching, burning, rashes, or lesions   LYMPH NODES: No enlarged glands  ENDOCRINE: No heat or cold intolerance; No hair loss  MUSCULOSKELETAL: No joint pain or swelling; No muscle, back, or extremity pain  PSYCHIATRIC: No depression, anxiety, mood swings, or difficulty sleeping  HEME/LYMPH: No easy bruising, or bleeding gums  ALLERY AND IMMUNOLOGIC: No hives or eczema    Physical Exam:  T(C): 36.1 (08-21-18 @ 12:00), Max: 36.8 (08-20-18 @ 18:41)  HR: 77 (08-21-18 @ 12:00) (74 - 81)  BP: 120/59 (08-21-18 @ 12:00) (104/67 - 193/80)  RR: 16 (08-21-18 @ 12:00) (16 - 24)  SpO2: 93% (08-21-18 @ 12:00) (93% - 96%)  Wt(kg): --    GENERAL: NAD, well-groomed, well-developed  HEAD:  Atraumatic, Normocephalic  EYES: EOMI, conjunctiva and sclera clear  ENT: Moist mucous membranes,  NECK: Supple, No JVD, no bruits  CHEST/LUNG: Clear to percussion bilaterally; No rales, rhonchi, wheezing, or rubs  HEART: Regular rate and rhythm; No murmurs, rubs, or gallops PMI non displaced.  ABDOMEN: Soft, Nontender, Nondistended; Bowel sounds present  EXTREMITIES:  2+ Peripheral Pulses, No clubbing, cyanosis, or edema  SKIN: No rashes or lesions  NERVOUS SYSTEM:  Cranial Nerves II-XII intact     Cardiovascular Diagnostic Testing:  ECG:    ECHO:    Labs:                        13.8   11.2  )-----------( 170      ( 20 Aug 2018 22:30 )             42.4     08-20    140  |  106  |  16  ----------------------------<  190<H>  3.7   |  27  |  0.92    Ca    8.9      20 Aug 2018 19:15    TPro  7.1  /  Alb  3.4  /  TBili  0.7  /  DBili  x   /  AST  21  /  ALT  22  /  AlkPhos  51  08-20    PT/INR - ( 20 Aug 2018 19:15 )   PT: 12.1 sec;   INR: 1.09 ratio         PTT - ( 20 Aug 2018 19:15 )  PTT:29.3 sec  CARDIAC MARKERS ( 21 Aug 2018 07:55 )  .110 ng/mL / x     / x     / x     / x      CARDIAC MARKERS ( 21 Aug 2018 01:23 )  .118 ng/mL / x     / x     / x     / x      CARDIAC MARKERS ( 20 Aug 2018 19:15 )  .095 ng/mL / x     / 51 U/L / x     / 1.0 ng/mL      Serum Pro-Brain Natriuretic Peptide: 1627 pg/mL (08-20 @ 19:15)            Imaging: Chief Complaint:     84 y o m Pt with hx cva , chf, afib. Pt c/o 1 week hx sob, cough with white sputum, sob has been worsening for the last 1- 2 weeks, aggravated by exertion,  no fever no chills. no cp.  no back pain. has leg swelling, states slightly increased from baseline. No abd pain nausea. noted to have mild elevation in trops,     HPI:    PMH:   Hypertension, unspecified type  CVA (cerebral vascular accident)  Congestive heart failure (CHF)  Afib    PSH:   No significant past surgical history    Family History:  FAMILY HISTORY:  No pertinent family history in first degree relatives      Social History:  Smoking:  Alcohol:  Drugs:    Allergies:  No Known Allergies      Medications:  acetaminophen   Tablet 650 milliGRAM(s) Oral every 6 hours PRN  apixaban 5 milliGRAM(s) Oral every 12 hours  aspirin enteric coated 81 milliGRAM(s) Oral daily  azithromycin  IVPB 500 milliGRAM(s) IV Intermittent every 24 hours  buDESOnide   0.5 milliGRAM(s) Respule 0.5 milliGRAM(s) Inhalation every 12 hours  cefTRIAXone   IVPB 1 Gram(s) IV Intermittent every 24 hours  furosemide   Injectable 40 milliGRAM(s) IV Push daily  isosorbide   dinitrate Tablet (ISORDIL) 20 milliGRAM(s) Oral three times a day  lisinopril 5 milliGRAM(s) Oral daily      REVIEW OF SYSTEMS:  CONSTITUTIONAL: No fever, weight loss, or fatigue  EYES: No eye pain, visual disturbances, or discharge  ENMT:  No difficulty hearing, tinnitus, vertigo; No sinus or throat pain  NECK: No pain or stiffness  BREASTS: No pain, masses, or nipple discharge  RESPIRATORY: No cough, wheezing, chills or hemoptysis; No shortness of breath  CARDIOVASCULAR: No chest pain, palpitations, dizziness, or leg swelling  GASTROINTESTINAL: No abdominal or epigastric pain. No nausea, vomiting, or hematemesis; No diarrhea or constipation. No melena or hematochezia.  GENITOURINARY: No dysuria, frequency, hematuria, or incontinence  NEUROLOGICAL: No headaches, memory loss, loss of strength, numbness, or tremors  SKIN: No itching, burning, rashes, or lesions   LYMPH NODES: No enlarged glands  ENDOCRINE: No heat or cold intolerance; No hair loss  MUSCULOSKELETAL: No joint pain or swelling; No muscle, back, or extremity pain  PSYCHIATRIC: No depression, anxiety, mood swings, or difficulty sleeping  HEME/LYMPH: No easy bruising, or bleeding gums  ALLERY AND IMMUNOLOGIC: No hives or eczema    Physical Exam:  T(C): 36.1 (08-21-18 @ 12:00), Max: 36.8 (08-20-18 @ 18:41)  HR: 77 (08-21-18 @ 12:00) (74 - 81)  BP: 120/59 (08-21-18 @ 12:00) (104/67 - 193/80)  RR: 16 (08-21-18 @ 12:00) (16 - 24)  SpO2: 93% (08-21-18 @ 12:00) (93% - 96%)  Wt(kg): --    GENERAL: appears older than stated age  HEAD:  Atraumatic, Normocephalic  EYES: EOMI, conjunctiva and sclera clear  ENT: Moist mucous membranes,  NECK: Supple, No JVD, no bruits  CHEST/LUNG: Clear to percussion bilaterally; No rales, rhonchi, wheezing, or rubs  HEART: Regular rate and rhythm; No murmurs, rubs, or gallops PMI non displaced.  ABDOMEN: Soft, Nontender, Nondistended; Bowel sounds present  EXTREMITIES:  2+ Peripheral Pulses, No clubbing, cyanosis, or edema  SKIN: No rashes or lesions  NERVOUS SYSTEM:  Cranial Nerves II-XII intact     Cardiovascular Diagnostic Testing:  ECG:  < from: 12 Lead ECG (08.20.18 @ 18:48) >  Ventricular Rate 72 BPM    Atrial Rate 59 BPM    QRS Duration 94 ms    Q-T Interval 414 ms    QTC Calculation(Bezet) 453 ms    R Axis -5 degrees    T Axis 30 degrees    Diagnosis Line Atrial fibrillation  Nonspecific ST and T wave abnormality  Abnormal ECG  When compared with ECG of 07-SEP-2017 05:41,  Nonspecific T wave abnormality no longer evident in Anterior leads    Confirmed by ORLY NICHOLSON MD ([id number]) on 8/21/2018 8:35:28 AM    < end of copied text >    ECHO:  < from: TTE Echo Complete w/Doppler (08.21.18 @ 08:15) >  Summary:   1. Left ventricular ejection fraction, by visual estimation, is 37%.   2. Moderately decreased global left ventricular systolic function.   3. Multiple left ventricular regional wall motion abnormalities exist.   See wall motion findings.   4. There is mild concentric left ventricular hypertrophy.   5. Moderate mitral stenosis.   6. Moderately decreased mitral leaflet mobility.   7. Thickening and calcification of the anterior and posterior mitral   valve leaflets.   8. Mild tricuspid regurgitation.   9. Sclerotic aortic valve with normal opening.  10. Pulmonic valve regurgitation.  11. LA volume Index is 87.7 ml/m² ml/m2.  12. The aortic valve mean gradient is 7.4 mmHg consistent with normally   opening aortic valve.    [id number] Orly Nicholson MD, [facility] , Electronically signed on 8/21/2018 at   1:45:45 PM      *** Final ***    ORLY NICHOLSON   This document has been electronically signed. Aug 21 2018  8:15AM    < end of copied text >      Labs:                        13.8   11.2  )-----------( 170      ( 20 Aug 2018 22:30 )             42.4     08-20    140  |  106  |  16  ----------------------------<  190<H>  3.7   |  27  |  0.92    Ca    8.9      20 Aug 2018 19:15    TPro  7.1  /  Alb  3.4  /  TBili  0.7  /  DBili  x   /  AST  21  /  ALT  22  /  AlkPhos  51  08-20    PT/INR - ( 20 Aug 2018 19:15 )   PT: 12.1 sec;   INR: 1.09 ratio         PTT - ( 20 Aug 2018 19:15 )  PTT:29.3 sec  CARDIAC MARKERS ( 21 Aug 2018 07:55 )  .110 ng/mL / x     / x     / x     / x      CARDIAC MARKERS ( 21 Aug 2018 01:23 )  .118 ng/mL / x     / x     / x     / x      CARDIAC MARKERS ( 20 Aug 2018 19:15 )  .095 ng/mL / x     / 51 U/L / x     / 1.0 ng/mL      Serum Pro-Brain Natriuretic Peptide: 1627 pg/mL (08-20 @ 19:15)      Imaging:    < from: Xray Chest 1 View AP/PA (08.20.18 @ 19:37) >  EXAM:  XR CHEST AP OR PA 1V      PROCEDURE DATE:  08/20/2018        INTERPRETATION:  CLINICAL INFORMATION: Shortness of breath.    A single portable view of the chest was obtained.     Comparison: None.     The mediastinal cardiac silhouette is unremarkable.    The lungs are clear.     No acute osseous finding. Marked degenerative changes of the bilateral   shoulders and unchanged probable traumatic deformity of the left humeral   head.    IMPRESSION:    No acute process.        BOBO ROCHA M.D., ATTENDING RADIOLOGIST  This document has been electronically signed. Aug 20 2018  7:40PM    < end of copied text > Chief Complaint:     84 y o m Pt with hx cva , chf, afib. Pt c/o 1 week hx sob, cough with white sputum, sob has been worsening for the last 1- 2 weeks, aggravated by exertion,  no fever no chills. no cp.  no back pain. has leg swelling, states slightly increased from baseline. No abd pain nausea. noted to have mild elevation in trops,     HPI:    PMH:   Hypertension, unspecified type  CVA (cerebral vascular accident)  Congestive heart failure (CHF)  Afib    PSH:   No significant past surgical history    Family History:  FAMILY HISTORY:  No pertinent family history in first degree relatives      Social History:  Smoking:  Alcohol:  Drugs:    Allergies:  No Known Allergies      Medications:  acetaminophen   Tablet 650 milliGRAM(s) Oral every 6 hours PRN  apixaban 5 milliGRAM(s) Oral every 12 hours  aspirin enteric coated 81 milliGRAM(s) Oral daily  azithromycin  IVPB 500 milliGRAM(s) IV Intermittent every 24 hours  buDESOnide   0.5 milliGRAM(s) Respule 0.5 milliGRAM(s) Inhalation every 12 hours  cefTRIAXone   IVPB 1 Gram(s) IV Intermittent every 24 hours  furosemide   Injectable 40 milliGRAM(s) IV Push daily  isosorbide   dinitrate Tablet (ISORDIL) 20 milliGRAM(s) Oral three times a day  lisinopril 5 milliGRAM(s) Oral daily      REVIEW OF SYSTEMS:  CONSTITUTIONAL: No fever, weight loss, or fatigue  EYES: No eye pain, visual disturbances, or discharge  ENMT:  No difficulty hearing, tinnitus, vertigo; No sinus or throat pain  NECK: No pain or stiffness  BREASTS: No pain, masses, or nipple discharge  RESPIRATORY: No cough, wheezing, chills or hemoptysis; No shortness of breath  CARDIOVASCULAR: No chest pain, palpitations, dizziness, or leg swelling  GASTROINTESTINAL: No abdominal or epigastric pain. No nausea, vomiting, or hematemesis; No diarrhea or constipation. No melena or hematochezia.  GENITOURINARY: No dysuria, frequency, hematuria, or incontinence  NEUROLOGICAL: No headaches, memory loss, loss of strength, numbness, or tremors  SKIN: No itching, burning, rashes, or lesions   LYMPH NODES: No enlarged glands  ENDOCRINE: No heat or cold intolerance; No hair loss  MUSCULOSKELETAL: No joint pain or swelling; No muscle, back, or extremity pain  PSYCHIATRIC: No depression, anxiety, mood swings, or difficulty sleeping  HEME/LYMPH: No easy bruising, or bleeding gums  ALLERY AND IMMUNOLOGIC: No hives or eczema    Physical Exam:  T(C): 36.1 (08-21-18 @ 12:00), Max: 36.8 (08-20-18 @ 18:41)  HR: 77 (08-21-18 @ 12:00) (74 - 81)  BP: 120/59 (08-21-18 @ 12:00) (104/67 - 193/80)  RR: 16 (08-21-18 @ 12:00) (16 - 24)  SpO2: 93% (08-21-18 @ 12:00) (93% - 96%)  Wt(kg): --    GENERAL: appears older than stated age  HEAD:  Atraumatic, Normocephalic  EYES: EOMI, conjunctiva and sclera clear  ENT: Moist mucous membranes,  NECK: Supple, No JVD, no bruits  CHEST/LUNG: Clear to percussion bilaterally; No rales, rhonchi, wheezing, or rubs  HEART: Regular rate and rhythm; No murmurs, rubs, or gallops PMI non displaced.  ABDOMEN: Soft, Nontender, Nondistended; Bowel sounds present  EXTREMITIES:  2+ Peripheral Pulses, No clubbing, cyanosis, or edema  SKIN: No rashes or lesions  NERVOUS SYSTEM:  Cranial Nerves II-XII intact     Cardiovascular Diagnostic Testing:  ECG:  < from: 12 Lead ECG (08.20.18 @ 18:48) >  Ventricular Rate 72 BPM    Atrial Rate 59 BPM    QRS Duration 94 ms    Q-T Interval 414 ms    QTC Calculation(Bezet) 453 ms    R Axis -5 degrees    T Axis 30 degrees    Diagnosis Line Atrial fibrillation  Nonspecific ST and T wave abnormality  Abnormal ECG  When compared with ECG of 07-SEP-2017 05:41,  Nonspecific T wave abnormality no longer evident in Anterior leads    Confirmed by ORLY NICHOLSON MD ([id number]) on 8/21/2018 8:35:28 AM    < end of copied text >    ECHO:  < from: TTE Echo Complete w/Doppler (08.21.18 @ 08:15) >  Summary:   1. Left ventricular ejection fraction, by visual estimation, is 37%.   2. Moderately decreased global left ventricular systolic function.   3. Multiple left ventricular regional wall motion abnormalities exist.   See wall motion findings.   4. There is mild concentric left ventricular hypertrophy.   5. Moderate mitral stenosis.   6. Moderately decreased mitral leaflet mobility.   7. Thickening and calcification of the anterior and posterior mitral   valve leaflets.   8. Mild tricuspid regurgitation.   9. Sclerotic aortic valve with normal opening.  10. Pulmonic valve regurgitation.  11. LA volume Index is 87.7 ml/m² ml/m2.  12. The aortic valve mean gradient is 7.4 mmHg consistent with normally   opening aortic valve.    [id number] Orly Nicholson MD, [facility] , Electronically signed on 8/21/2018 at   1:45:45 PM      *** Final ***    ORLY NICHOLSON   This document has been electronically signed. Aug 21 2018  8:15AM    < end of copied text >      Labs:                        13.8   11.2  )-----------( 170      ( 20 Aug 2018 22:30 )             42.4     08-20    140  |  106  |  16  ----------------------------<  190<H>  3.7   |  27  |  0.92    Ca    8.9      20 Aug 2018 19:15    TPro  7.1  /  Alb  3.4  /  TBili  0.7  /  DBili  x   /  AST  21  /  ALT  22  /  AlkPhos  51  08-20    PT/INR - ( 20 Aug 2018 19:15 )   PT: 12.1 sec;   INR: 1.09 ratio         PTT - ( 20 Aug 2018 19:15 )  PTT:29.3 sec  CARDIAC MARKERS ( 21 Aug 2018 07:55 )  .110 ng/mL / x     / x     / x     / x      CARDIAC MARKERS ( 21 Aug 2018 01:23 )  .118 ng/mL / x     / x     / x     / x      CARDIAC MARKERS ( 20 Aug 2018 19:15 )  .095 ng/mL / x     / 51 U/L / x     / 1.0 ng/mL    cath 3/14  lm no angiographic evidence of disease mil luminal irregularities of mid and distal segments lad sten in mid segment sten is patent with mild ISR moderate 50-60% disease of ivonne proximal segment calcified circumflex mild luminal irregularities       Serum Pro-Brain Natriuretic Peptide: 1627 pg/mL (08-20 @ 19:15)      Imaging:    < from: Xray Chest 1 View AP/PA (08.20.18 @ 19:37) >  EXAM:  XR CHEST AP OR PA 1V      PROCEDURE DATE:  08/20/2018        INTERPRETATION:  CLINICAL INFORMATION: Shortness of breath.    A single portable view of the chest was obtained.     Comparison: None.     The mediastinal cardiac silhouette is unremarkable.    The lungs are clear.     No acute osseous finding. Marked degenerative changes of the bilateral   shoulders and unchanged probable traumatic deformity of the left humeral   head.    IMPRESSION:    No acute process.        BOBO ROCHA M.D., ATTENDING RADIOLOGIST  This document has been electronically signed. Aug 20 2018  7:40PM    < end of copied text >

## 2018-08-21 NOTE — PROGRESS NOTE ADULT - PROBLEM SELECTOR PLAN 1
admit to tele, cardiac monitoring, serial trops,   c/w lasix IV, o2 support, dvt ppx, f/u echo   cardiology consult with Dr. Bangura

## 2018-08-22 ENCOUNTER — TRANSCRIPTION ENCOUNTER (OUTPATIENT)
Age: 83
End: 2018-08-22

## 2018-08-22 VITALS — WEIGHT: 170.42 LBS

## 2018-08-22 DIAGNOSIS — E87.6 HYPOKALEMIA: ICD-10-CM

## 2018-08-22 LAB
ANION GAP SERPL CALC-SCNC: 6 MMOL/L — SIGNIFICANT CHANGE UP (ref 5–17)
APPEARANCE UR: CLEAR — SIGNIFICANT CHANGE UP
BACTERIA # UR AUTO: NEGATIVE /HPF — SIGNIFICANT CHANGE UP
BASOPHILS # BLD AUTO: 0.1 K/UL — SIGNIFICANT CHANGE UP (ref 0–0.2)
BASOPHILS NFR BLD AUTO: 0.7 % — SIGNIFICANT CHANGE UP (ref 0–2)
BILIRUB UR-MCNC: NEGATIVE — SIGNIFICANT CHANGE UP
BUN SERPL-MCNC: 21 MG/DL — SIGNIFICANT CHANGE UP (ref 7–23)
CALCIUM SERPL-MCNC: 8.5 MG/DL — SIGNIFICANT CHANGE UP (ref 8.4–10.5)
CHLORIDE SERPL-SCNC: 100 MMOL/L — SIGNIFICANT CHANGE UP (ref 96–108)
CO2 SERPL-SCNC: 32 MMOL/L — HIGH (ref 22–31)
COLOR SPEC: YELLOW — SIGNIFICANT CHANGE UP
CREAT SERPL-MCNC: 1.1 MG/DL — SIGNIFICANT CHANGE UP (ref 0.5–1.3)
DIFF PNL FLD: NEGATIVE — SIGNIFICANT CHANGE UP
EOSINOPHIL # BLD AUTO: 1.1 K/UL — HIGH (ref 0–0.5)
EOSINOPHIL NFR BLD AUTO: 9.5 % — HIGH (ref 0–6)
EPI CELLS # UR: SIGNIFICANT CHANGE UP
GLUCOSE SERPL-MCNC: 106 MG/DL — HIGH (ref 70–99)
GLUCOSE UR QL: NEGATIVE — SIGNIFICANT CHANGE UP
HCT VFR BLD CALC: 41 % — SIGNIFICANT CHANGE UP (ref 39–50)
HGB BLD-MCNC: 13.4 G/DL — SIGNIFICANT CHANGE UP (ref 13–17)
KETONES UR-MCNC: NEGATIVE — SIGNIFICANT CHANGE UP
LEUKOCYTE ESTERASE UR-ACNC: NEGATIVE — SIGNIFICANT CHANGE UP
LYMPHOCYTES # BLD AUTO: 19.3 % — SIGNIFICANT CHANGE UP (ref 13–44)
LYMPHOCYTES # BLD AUTO: 2.1 K/UL — SIGNIFICANT CHANGE UP (ref 1–3.3)
MCHC RBC-ENTMCNC: 32.2 PG — SIGNIFICANT CHANGE UP (ref 27–34)
MCHC RBC-ENTMCNC: 32.6 GM/DL — SIGNIFICANT CHANGE UP (ref 32–36)
MCV RBC AUTO: 98.7 FL — SIGNIFICANT CHANGE UP (ref 80–100)
MONOCYTES # BLD AUTO: 1.4 K/UL — HIGH (ref 0–0.9)
MONOCYTES NFR BLD AUTO: 12.9 % — HIGH (ref 1–9)
NEUTROPHILS # BLD AUTO: 6.4 K/UL — SIGNIFICANT CHANGE UP (ref 1.8–7.4)
NEUTROPHILS NFR BLD AUTO: 57.6 % — SIGNIFICANT CHANGE UP (ref 43–77)
NITRITE UR-MCNC: NEGATIVE — SIGNIFICANT CHANGE UP
PH UR: 7 — SIGNIFICANT CHANGE UP (ref 5–8)
PLATELET # BLD AUTO: 162 K/UL — SIGNIFICANT CHANGE UP (ref 150–400)
POTASSIUM SERPL-MCNC: 3 MMOL/L — LOW (ref 3.5–5.3)
POTASSIUM SERPL-SCNC: 3 MMOL/L — LOW (ref 3.5–5.3)
PROT UR-MCNC: 15
RBC # BLD: 4.15 M/UL — LOW (ref 4.2–5.8)
RBC # FLD: 13 % — SIGNIFICANT CHANGE UP (ref 10.3–14.5)
RBC CASTS # UR COMP ASSIST: SIGNIFICANT CHANGE UP /HPF (ref 0–4)
SODIUM SERPL-SCNC: 138 MMOL/L — SIGNIFICANT CHANGE UP (ref 135–145)
SP GR SPEC: 1 — LOW (ref 1.01–1.02)
UROBILINOGEN FLD QL: NEGATIVE — SIGNIFICANT CHANGE UP
WBC # BLD: 11.1 K/UL — HIGH (ref 3.8–10.5)
WBC # FLD AUTO: 11.1 K/UL — HIGH (ref 3.8–10.5)
WBC UR QL: SIGNIFICANT CHANGE UP /HPF (ref 0–5)

## 2018-08-22 PROCEDURE — 83880 ASSAY OF NATRIURETIC PEPTIDE: CPT

## 2018-08-22 PROCEDURE — 99285 EMERGENCY DEPT VISIT HI MDM: CPT | Mod: 25

## 2018-08-22 PROCEDURE — 85730 THROMBOPLASTIN TIME PARTIAL: CPT

## 2018-08-22 PROCEDURE — 99233 SBSQ HOSP IP/OBS HIGH 50: CPT

## 2018-08-22 PROCEDURE — 93005 ELECTROCARDIOGRAM TRACING: CPT

## 2018-08-22 PROCEDURE — 99239 HOSP IP/OBS DSCHRG MGMT >30: CPT

## 2018-08-22 PROCEDURE — 71275 CT ANGIOGRAPHY CHEST: CPT

## 2018-08-22 PROCEDURE — 80307 DRUG TEST PRSMV CHEM ANLYZR: CPT

## 2018-08-22 PROCEDURE — 82553 CREATINE MB FRACTION: CPT

## 2018-08-22 PROCEDURE — 93306 TTE W/DOPPLER COMPLETE: CPT

## 2018-08-22 PROCEDURE — 85610 PROTHROMBIN TIME: CPT

## 2018-08-22 PROCEDURE — 82550 ASSAY OF CK (CPK): CPT

## 2018-08-22 PROCEDURE — 78452 HT MUSCLE IMAGE SPECT MULT: CPT

## 2018-08-22 PROCEDURE — 71045 X-RAY EXAM CHEST 1 VIEW: CPT

## 2018-08-22 PROCEDURE — 80048 BASIC METABOLIC PNL TOTAL CA: CPT

## 2018-08-22 PROCEDURE — 84484 ASSAY OF TROPONIN QUANT: CPT

## 2018-08-22 PROCEDURE — 81001 URINALYSIS AUTO W/SCOPE: CPT

## 2018-08-22 PROCEDURE — 85379 FIBRIN DEGRADATION QUANT: CPT

## 2018-08-22 PROCEDURE — 94640 AIRWAY INHALATION TREATMENT: CPT

## 2018-08-22 PROCEDURE — 85027 COMPLETE CBC AUTOMATED: CPT

## 2018-08-22 PROCEDURE — 80053 COMPREHEN METABOLIC PANEL: CPT

## 2018-08-22 PROCEDURE — 96374 THER/PROPH/DIAG INJ IV PUSH: CPT

## 2018-08-22 PROCEDURE — 74018 RADEX ABDOMEN 1 VIEW: CPT

## 2018-08-22 RX ORDER — LISINOPRIL 2.5 MG/1
1 TABLET ORAL
Qty: 30 | Refills: 0
Start: 2018-08-22 | End: 2018-09-20

## 2018-08-22 RX ORDER — LISINOPRIL 2.5 MG/1
1 TABLET ORAL
Qty: 30 | Refills: 0 | OUTPATIENT
Start: 2018-08-22 | End: 2018-09-20

## 2018-08-22 RX ORDER — POTASSIUM CHLORIDE 20 MEQ
40 PACKET (EA) ORAL EVERY 4 HOURS
Qty: 0 | Refills: 0 | Status: DISCONTINUED | OUTPATIENT
Start: 2018-08-22 | End: 2018-08-22

## 2018-08-22 RX ORDER — CEFUROXIME AXETIL 250 MG
1 TABLET ORAL
Qty: 10 | Refills: 0
Start: 2018-08-22 | End: 2018-08-26

## 2018-08-22 RX ORDER — METOPROLOL TARTRATE 50 MG
1 TABLET ORAL
Qty: 0 | Refills: 0 | COMMUNITY

## 2018-08-22 RX ORDER — CEFUROXIME AXETIL 250 MG
1 TABLET ORAL
Qty: 10 | Refills: 0 | OUTPATIENT
Start: 2018-08-22 | End: 2018-08-26

## 2018-08-22 RX ORDER — AZITHROMYCIN 500 MG/1
1 TABLET, FILM COATED ORAL
Qty: 5 | Refills: 0 | OUTPATIENT
Start: 2018-08-22 | End: 2018-08-26

## 2018-08-22 RX ORDER — METOPROLOL TARTRATE 50 MG
1 TABLET ORAL
Qty: 0 | Refills: 0 | DISCHARGE
Start: 2018-08-22

## 2018-08-22 RX ORDER — ISOSORBIDE DINITRATE 5 MG/1
1 TABLET ORAL
Qty: 90 | Refills: 0 | OUTPATIENT
Start: 2018-08-22 | End: 2018-09-20

## 2018-08-22 RX ORDER — AZITHROMYCIN 500 MG/1
1 TABLET, FILM COATED ORAL
Qty: 5 | Refills: 0
Start: 2018-08-22 | End: 2018-08-26

## 2018-08-22 RX ADMIN — Medication 40 MILLIEQUIVALENT(S): at 11:32

## 2018-08-22 RX ADMIN — Medication 40 MILLIGRAM(S): at 05:27

## 2018-08-22 RX ADMIN — LISINOPRIL 5 MILLIGRAM(S): 2.5 TABLET ORAL at 05:27

## 2018-08-22 RX ADMIN — AZITHROMYCIN 255 MILLIGRAM(S): 500 TABLET, FILM COATED ORAL at 06:08

## 2018-08-22 RX ADMIN — CEFTRIAXONE 100 GRAM(S): 500 INJECTION, POWDER, FOR SOLUTION INTRAMUSCULAR; INTRAVENOUS at 05:27

## 2018-08-22 RX ADMIN — ISOSORBIDE DINITRATE 20 MILLIGRAM(S): 5 TABLET ORAL at 05:27

## 2018-08-22 RX ADMIN — APIXABAN 5 MILLIGRAM(S): 2.5 TABLET, FILM COATED ORAL at 05:27

## 2018-08-22 RX ADMIN — ISOSORBIDE DINITRATE 20 MILLIGRAM(S): 5 TABLET ORAL at 13:16

## 2018-08-22 RX ADMIN — Medication 81 MILLIGRAM(S): at 11:32

## 2018-08-22 NOTE — DIETITIAN INITIAL EVALUATION ADULT. - SOURCE
Patient noted on Consistent Carbohydrate diet , no history of DM noted, suggest change diet to low sodium due to PMH of HTN./patient

## 2018-08-22 NOTE — DISCHARGE NOTE ADULT - SECONDARY DIAGNOSIS.
Chronic atrial fibrillation Cerebrovascular accident (CVA), unspecified mechanism Elevated troponin Hypertension, unspecified type Hypokalemia Cough

## 2018-08-22 NOTE — PROGRESS NOTE ADULT - ASSESSMENT
84 year old man with multiple medical problems admitted with cough, dyspnea.  Likely component of CHF.  Elevated troponin.  He has history of CAD, s/p cath about 2 years ago.  AF, s/p CVA.  I discussed with patient and his daughter at length that he requires further cardiac testing.  I recommend cardiac cath possible coronary intervention as he may have progression of CAD... he is refusing.  I then suggested nuclear stress test... he is again refusing.   He absolutely insists on being discharged.  He appears to be competent. I told him he would be leaving the hospital against medical advise, and that he is at risk for further cardiac events including MI, worsening CHF, arrhythmia and possible death..... he says he understands and that he is willing to accept all risks.    Cardiac issues were discussed at length and all of his questions were answered to his satisfaction.   His daughter who is at bedside witnessed all of the above.  He should follow up with his primary cardiologist Dr. Rubio post discharge.     discussed with Hospitalist and NP.

## 2018-08-22 NOTE — PROGRESS NOTE ADULT - PROBLEM SELECTOR PLAN 7
K=3.0 - repelete, encourage diet rich in K  unable to recheck K as pt. signed AMA K=3.0 - repeleted, encourage diet rich in K  unable to recheck K as pt. signed AMA

## 2018-08-22 NOTE — DISCHARGE NOTE ADULT - PATIENT PORTAL LINK FT
You can access the [facility] Patient Portal, offered by [facility], by registering with the following website: [email or web address]

## 2018-08-22 NOTE — PROGRESS NOTE ADULT - PROBLEM SELECTOR PLAN 6
Pt. 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 German and Hospitalist team explained risks, pt. a&o x 3, aware of risks, daughter at bedside

## 2018-08-22 NOTE — DISCHARGE NOTE ADULT - CARE PLAN
Principal Discharge DX:	Acute on chronic congestive heart failure, unspecified heart failure type  Goal:	prevent exacerbations  Assessment and plan of treatment:	Cont. asa, lasix, added ACEI, daily weights, cardiac diet fluid restriction 1.5 L/day  Echo - reduced EF 37%  Pt. refused cath, nuclear stress test and signed AMA  Cardiologist and Hospitalist team explained risks - pt. a&ox3 understands risks, daughter at bedside  Secondary Diagnosis:	Chronic atrial fibrillation  Goal:	prevent RVR  Assessment and plan of treatment:	Cont. eliquis  Secondary Diagnosis:	Cerebrovascular accident (CVA), unspecified mechanism  Goal:	prevent cva  Assessment and plan of treatment:	cont. 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 refused Inh

## 2018-08-22 NOTE — PROGRESS NOTE ADULT - PROBLEM SELECTOR PLAN 1
elevated pro BNP, elevated trops 2/2 suspect troponin leak   ont. lasix, daily weights, fluid restriction 1.5 L/day   Cardiac following - Dr. German   Cont. ASA, ACEI, no BB 2/2 COPD, elevated pro BNP, elevated trops 2/2 suspect troponin leak   cont. lasix, daily weights, fluid restriction 1.5 L/day   Cardiac following - Dr. German   Cont. ASA, ACEI, no BB 2/2 COPD,

## 2018-08-22 NOTE — PROGRESS NOTE ADULT - SUBJECTIVE AND OBJECTIVE BOX
Patient is a 84y old  Male who presents with a chief complaint of shortness of breath. (20 Aug 2018 21:54)      Patient seen and examined at bedside, no events overnight, refused cath and nuclear stress test, wants to sign AMA.    ALLERGIES:  No Known Allergies    MEDICATIONS:  acetaminophen   Tablet 650 milliGRAM(s) Oral every 6 hours PRN  apixaban 5 milliGRAM(s) Oral every 12 hours  aspirin enteric coated 81 milliGRAM(s) Oral daily  azithromycin  IVPB 500 milliGRAM(s) IV Intermittent every 24 hours  buDESOnide   0.5 milliGRAM(s) Respule 0.5 milliGRAM(s) Inhalation every 12 hours  cefTRIAXone   IVPB 1 Gram(s) IV Intermittent every 24 hours  furosemide   Injectable 40 milliGRAM(s) IV Push daily  isosorbide   dinitrate Tablet (ISORDIL) 20 milliGRAM(s) Oral three times a day  lisinopril 5 milliGRAM(s) Oral daily  potassium chloride    Tablet ER 40 milliEquivalent(s) Oral every 4 hours    Vital Signs Last 24 Hrs  T(C): 36.8 (22 Aug 2018 05:20), Max: 36.9 (21 Aug 2018 21:22)  T(F): 98.2 (22 Aug 2018 05:20), Max: 98.5 (21 Aug 2018 21:22)  HR: 68 (22 Aug 2018 05:20) (68 - 77)  BP: 141/71 (22 Aug 2018 05:20) (125/52 - 146/70)  BP(mean): --  RR: 18 (22 Aug 2018 05:20) (16 - 20)  SpO2: 94% (22 Aug 2018 05:20) (94% - 97%)  I&O's Summary    21 Aug 2018 07:01  -  22 Aug 2018 07:00  --------------------------------------------------------  IN: 480 mL / OUT: 750 mL / NET: -270 mL          PAST MEDICAL & SURGICAL HISTORY:  Hypertension, unspecified type  CVA (cerebral vascular accident)  Congestive heart failure (CHF)  Afib  No significant past surgical history      Home Medications:  Aspir 81 oral delayed release tablet: 1 tab(s) orally once a day (20 Aug 2018 22:30)  Eliquis 5 mg oral tablet: 1 tab(s) orally 2 times a day (20 Aug 2018 22:30)  Lasix 40 mg oral tablet: 1 tab(s) orally once a day (20 Aug 2018 22:30)  metoprolol succinate 50 mg oral tablet, extended release: 1 tab(s) orally once a day (20 Aug 2018 22:30)      PHYSICAL EXAM:  General: NAD, A/O x3  ENT: MMM  Neck: Supple, No JVD  Lungs: Clear to percussion bilaterally  Cardio: RRR, S1/S2, No murmurs  Abdomen: Soft, Nontender, Nondistended; Bowel sounds present  Extremities: Right sided LE weakness,  No clubbing, cyanosis, or edema    LABS:                        13.4   11.1  )-----------( 162      ( 22 Aug 2018 06:12 )             41.0     08    138  |  100  |  21  ----------------------------<  106  3.0   |  32  |  1.10    Ca    8.5      22 Aug 2018 06:12    TPro  7.0  /  Alb  3.3  /  TBili  0.5  /  DBili  x   /  AST  20  /  ALT  18  /  AlkPhos  50  08-21    PT/INR - ( 20 Aug 2018 19:15 )   PT: 12.1 sec;   INR: 1.09 ratio         PTT - ( 20 Aug 2018 19:15 )  PTT:29.3 sec      Urinalysis Basic - ( 22 Aug 2018 06:02 )    Color: Yellow / Appearance: Clear / S.005 / pH: x  Gluc: x / Ketone: Negative  / Bili: Negative / Urobili: Negative   Blood: x / Protein: 15 / Nitrite: Negative   Leuk Esterase: Negative / RBC: 0-4 /HPF / WBC 0-2 /HPF   Sq Epi: x / Non Sq Epi: Neg.-Few / Bacteria: Negative /HPF      RADIOLOGY & ADDITIONAL TESTS: < from: CT Angio Chest w/ IV Cont (18 @ 21:51) >  IMPRESSION:    Detailed evaluation of subsegmental branches of bilateral lower lobe   pulmonary arteries is limited secondary to bibasilar airspace disease   without filling defects identified in main, right and left pulmonary   arteries to suggest pulmonary emboli.    Mild bilateral groundglass attenuation, concerning for mild pulmonary   vascular congestion. Mild bibasilar linear branching opacities, which may   represent mucoid impaction or distal small airway disease. Clinical   correlation is advised to assess for superimposed atypical infection.   Trace bilateral pleural effusions.     Cardiomegaly with findings of cardiac dysfunction. Ascending thoracic   aorta measures 4 cm in maximum AP diameter. 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 Consider correlation with echocardiogram for further assessment.   Continued prominent mediastinal lymph nodes.    < end of copied text >      Care Discussed with Consultants/Other Providers: Hospitalist, Cardiology - Dr. German Patient is a 84y old  Male who presents with a chief complaint of shortness of breath. (20 Aug 2018 21:54)      Patient seen and examined at bedside, no events overnight, refused cath and nuclear stress test, wants to sign AMA. Daughter at bedside updated, understands pt's wishes    ALLERGIES:  No Known Allergies    MEDICATIONS:  acetaminophen   Tablet 650 milliGRAM(s) Oral every 6 hours PRN  apixaban 5 milliGRAM(s) Oral every 12 hours  aspirin enteric coated 81 milliGRAM(s) Oral daily  azithromycin  IVPB 500 milliGRAM(s) IV Intermittent every 24 hours  buDESOnide   0.5 milliGRAM(s) Respule 0.5 milliGRAM(s) Inhalation every 12 hours  cefTRIAXone   IVPB 1 Gram(s) IV Intermittent every 24 hours  furosemide   Injectable 40 milliGRAM(s) IV Push daily  isosorbide   dinitrate Tablet (ISORDIL) 20 milliGRAM(s) Oral three times a day  lisinopril 5 milliGRAM(s) Oral daily  potassium chloride    Tablet ER 40 milliEquivalent(s) Oral every 4 hours    Vital Signs Last 24 Hrs  T(C): 36.8 (22 Aug 2018 05:20), Max: 36.9 (21 Aug 2018 21:22)  T(F): 98.2 (22 Aug 2018 05:20), Max: 98.5 (21 Aug 2018 21:22)  HR: 68 (22 Aug 2018 05:20) (68 - 77)  BP: 141/71 (22 Aug 2018 05:20) (125/52 - 146/70)  BP(mean): --  RR: 18 (22 Aug 2018 05:20) (16 - 20)  SpO2: 94% (22 Aug 2018 05:20) (94% - 97%)  I&O's Summary    21 Aug 2018 07:01  -  22 Aug 2018 07:00  --------------------------------------------------------  IN: 480 mL / OUT: 750 mL / NET: -270 mL          PAST MEDICAL & SURGICAL HISTORY:  Hypertension, unspecified type  CVA (cerebral vascular accident)  Congestive heart failure (CHF)  Afib  No significant past surgical history      Home Medications:  Aspir 81 oral delayed release tablet: 1 tab(s) orally once a day (20 Aug 2018 22:30)  Eliquis 5 mg oral tablet: 1 tab(s) orally 2 times a day (20 Aug 2018 22:30)  Lasix 40 mg oral tablet: 1 tab(s) orally once a day (20 Aug 2018 22:30)  metoprolol succinate 50 mg oral tablet, extended release: 1 tab(s) orally once a day (20 Aug 2018 22:30)      PHYSICAL EXAM:  General: NAD, A/O x3  ENT: MMM  Neck: Supple, No JVD  Lungs: Clear to percussion bilaterally  Cardio: RRR, S1/S2, No murmurs  Abdomen: Soft, Nontender, Nondistended; Bowel sounds present  Extremities: Right sided LE weakness,  No clubbing, cyanosis, or edema  Neuro: no deficits    LABS:                        13.4   11.1  )-----------( 162      ( 22 Aug 2018 06:12 )             41.0     08-    138  |  100  |  21  ----------------------------<  106  3.0   |  32  |  1.10    Ca    8.5      22 Aug 2018 06:12    TPro  7.0  /  Alb  3.3  /  TBili  0.5  /  DBili  x   /  AST  20  /  ALT  18  /  AlkPhos  50  08-21    PT/INR - ( 20 Aug 2018 19:15 )   PT: 12.1 sec;   INR: 1.09 ratio         PTT - ( 20 Aug 2018 19:15 )  PTT:29.3 sec      Urinalysis Basic - ( 22 Aug 2018 06:02 )    Color: Yellow / Appearance: Clear / S.005 / pH: x  Gluc: x / Ketone: Negative  / Bili: Negative / Urobili: Negative   Blood: x / Protein: 15 / Nitrite: Negative   Leuk Esterase: Negative / RBC: 0-4 /HPF / WBC 0-2 /HPF   Sq Epi: x / Non Sq Epi: Neg.-Few / Bacteria: Negative /HPF      RADIOLOGY & ADDITIONAL TESTS: < from: CT Angio Chest w/ IV Cont (18 @ 21:51) >  IMPRESSION:    Detailed evaluation of subsegmental branches of bilateral lower lobe   pulmonary arteries is limited secondary to bibasilar airspace disease   without filling defects identified in main, right and left pulmonary   arteries to suggest pulmonary emboli.    Mild bilateral groundglass attenuation, concerning for mild pulmonary   vascular congestion. Mild bibasilar linear branching opacities, which may   represent mucoid impaction or distal small airway disease. Clinical   correlation is advised to assess for superimposed atypical infection.   Trace bilateral pleural effusions.     Cardiomegaly with findings of cardiac dysfunction. Ascending thoracic   aorta measures 4 cm in maximum AP diameter. 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 Consider correlation with echocardiogram for further assessment.   Continued prominent mediastinal lymph nodes.    < end of copied text >      Care Discussed with Consultants/Other Providers: Hospitalist, Cardiology - Dr. German

## 2018-08-22 NOTE — PROGRESS NOTE ADULT - ATTENDING COMMENTS
Plan of care discussed with patient, and agrees, all questions answered.   Janae Valle MD
I have personally seen and examined patient on the above date.  I discussed the case with Armin and I agree with findings and plan as detailed per note above, which I have amended where appropriate.      Pt has CAD, spoke to [facility] Cardiologist, daughter at bedside, should have a nuclear stress and/or cath but pt is 84 and does not want it, he was counseled as was the daughter on the risks/benefits, signed out AMA, did not want nebs/steroids for his lungs either, will see his PCP  in [place].

## 2018-08-22 NOTE — DISCHARGE NOTE ADULT - HOSPITAL COURSE
83 y/o male with acute on chronic combined CHF, HTN, CAD, elevated trops, p/w chest pain/cough, CT shows possible atypical PNA. Pt. refused cath, nuclear stress tests, signed AMA, risks explained by Cardiologist - Dr. German and Hospitalist team (Dr. GENA Earl and NP GENA Campbell)

## 2018-08-22 NOTE — DISCHARGE NOTE ADULT - PLAN OF CARE
prevent exacerbations Cont. asa, lasix, added ACEI, daily weights, cardiac diet fluid restriction 1.5 L/day  Echo - reduced EF 37%  Pt. refused cath, nuclear stress test and signed AMA  Cardiologist and Hospitalist team explained risks - pt. a&ox3 understands risks, daughter at bedside prevent RVR Cont. eliquis prevent cva cont. asa, refuses to take statin, BP control suspect NSTEMI - pt. with 60% blockage in proximal coronaries in 2014  Refused cath and nuclear stress test, signed AMA, Cardiac and hospitalist explained risks controlled ACEI was added replete suspect atypical PNA - zithromax and ceftin x 5 days  Pt. refused Inh

## 2018-08-22 NOTE — DISCHARGE NOTE ADULT - PROVIDER TOKENS
FREE:[LAST:[[facility]],PHONE:[(   )    -],FAX:[(   )    -],ADDRESS:[[facility]]]

## 2018-08-22 NOTE — PROGRESS NOTE ADULT - SUBJECTIVE AND OBJECTIVE BOX
SUBJ:  Patient is a 84y old  Male who presents with a chief complaint of shortness of breath. (20 Aug 2018 21:54)  he denies shortness of breath of chest pain  he insists on going home and refuses any further testing       PAST MEDICAL & SURGICAL HISTORY:  Hypertension, unspecified type  CVA (cerebral vascular accident)  Congestive heart failure (CHF)  Afib  No significant past surgical history      MEDICATIONS  (STANDING):  apixaban 5 milliGRAM(s) Oral every 12 hours  aspirin enteric coated 81 milliGRAM(s) Oral daily  azithromycin  IVPB 500 milliGRAM(s) IV Intermittent every 24 hours  buDESOnide   0.5 milliGRAM(s) Respule 0.5 milliGRAM(s) Inhalation every 12 hours  cefTRIAXone   IVPB 1 Gram(s) IV Intermittent every 24 hours  furosemide   Injectable 40 milliGRAM(s) IV Push daily  isosorbide   dinitrate Tablet (ISORDIL) 20 milliGRAM(s) Oral three times a day  lisinopril 5 milliGRAM(s) Oral daily  potassium chloride    Tablet ER 40 milliEquivalent(s) Oral every 4 hours    MEDICATIONS  (PRN):  acetaminophen   Tablet 650 milliGRAM(s) Oral every 6 hours PRN For Temp greater than 38 C (100.4 F)          Vital Signs Last 24 Hrs  T(C): 36.8 (22 Aug 2018 05:20), Max: 36.9 (21 Aug 2018 21:22)  T(F): 98.2 (22 Aug 2018 05:20), Max: 98.5 (21 Aug 2018 21:22)  HR: 68 (22 Aug 2018 05:20) (68 - 77)  BP: 141/71 (22 Aug 2018 05:20) (120/59 - 146/70)  BP(mean): --  RR: 18 (22 Aug 2018 05:20) (16 - 20)  SpO2: 94% (22 Aug 2018 05:20) (93% - 97%)    REVIEW OF SYSTEMS:  CONSTITUTIONAL: No fever, weight loss, or fatigue  RESPIRATORY: dyspnea and cough   CARDIOVASCULAR: No chest pain or chest pressure.  No shortness of breath or dyspnea on exertion.  No palpitations, dizziness, light headedness, syncope or near syncope.  No edema, no orthopnea.   NEUROLOGICAL: No headaches, memory loss, loss of strength, numbness, or tremors      PHYSICAL EXAM  Constitutional:  WDWN. No acute distress  HEENT: normocephalic, atraumatic.  PERRLA. EOMI  Neck : No JVD. no carotid bruits  Lungs:  scattered rhonchi bilaterally   Heart:  S1 and S2. No S3, S4. II/VI systolic murmur.  Abdomen:  soft, non tender.  Extremities: No clubbing, cyanoisis or edema  Skin:  no rashes    TELEMETRY    ECG: < from: 12 Lead ECG (08.20.18 @ 18:48) >  Atrial fibrillation  Nonspecific ST and T wave abnormality  Abnormal ECG  When compared with ECG of 07-SEP-2017 05:41,  Nonspecific T wave abnormality no longer evident in Anterior leads    < end of copied text >      TTE:  < from: TTE Echo Complete w/Doppler (08.21.18 @ 08:15) >  1. Left ventricular ejection fraction, by visual estimation, is 37%.   2. Moderately decreased global left ventricular systolic function.   3. Multiple left ventricular regional wall motion abnormalities exist.   See wall motion findings.   4. There is mild concentric left ventricular hypertrophy.   5. Moderate mitral stenosis.   6. Moderately decreased mitral leaflet mobility.   7. Thickening and calcification of the anterior and posterior mitral   valve leaflets.   8. Mild tricuspid regurgitation.   9. Sclerotic aortic valve with normal opening.  10. Pulmonic valve regurgitation.  11. LA volume Index is 87.7 ml/m² ml/m2.  12. The aortic valve mean gradient is 7.4 mmHg consistent with normally   opening aortic valve.    < end of copied text >      LABS:                        13.4   11.1  )-----------( 162      ( 22 Aug 2018 06:12 )             41.0     08-22    138  |  100  |  21  ----------------------------<  106<H>  3.0<L>   |  32<H>  |  1.10    Ca    8.5      22 Aug 2018 06:12    TPro  7.0  /  Alb  3.3  /  TBili  0.5  /  DBili  x   /  AST  20  /  ALT  18  /  AlkPhos  50  08-21    CARDIAC MARKERS ( 21 Aug 2018 15:27 )  .091 ng/mL / x     / x     / x     / x      CARDIAC MARKERS ( 21 Aug 2018 07:55 )  .110 ng/mL / x     / x     / x     / x      CARDIAC MARKERS ( 21 Aug 2018 01:23 )  .118 ng/mL / x     / x     / x     / x      CARDIAC MARKERS ( 20 Aug 2018 19:15 )  .095 ng/mL / x     / 51 U/L / x     / 1.0 ng/mL      CARDIAC MARKERS ( 21 Aug 2018 15:27 )  .091 ng/mL / x     / x     / x     / x      CARDIAC MARKERS ( 21 Aug 2018 07:55 )  .110 ng/mL / x     / x     / x     / x      CARDIAC MARKERS ( 21 Aug 2018 01:23 )  .118 ng/mL / x     / x     / x     / x      CARDIAC MARKERS ( 20 Aug 2018 19:15 )  .095 ng/mL / x     / 51 U/L / x     / 1.0 ng/mL      acetaminophen   Tablet 650 milliGRAM(s) Oral every 6 hours PRN  apixaban 5 milliGRAM(s) Oral every 12 hours  aspirin enteric coated 81 milliGRAM(s) Oral daily  azithromycin  IVPB 500 milliGRAM(s) IV Intermittent every 24 hours  buDESOnide   0.5 milliGRAM(s) Respule 0.5 milliGRAM(s) Inhalation every 12 hours  cefTRIAXone   IVPB 1 Gram(s) IV Intermittent every 24 hours  furosemide   Injectable 40 milliGRAM(s) IV Push daily  isosorbide   dinitrate Tablet (ISORDIL) 20 milliGRAM(s) Oral three times a day  lisinopril 5 milliGRAM(s) Oral daily  potassium chloride    Tablet ER 40 milliEquivalent(s) Oral every 4 hours    I&O's Summary    21 Aug 2018 07:01  -  22 Aug 2018 07:00  --------------------------------------------------------  IN: 480 mL / OUT: 750 mL / NET: -270 mL      BNPSerum Pro-Brain Natriuretic Peptide: 1306 pg/mL (08-21 @ 15:27)    Troponin I, Serum: .091 ng/mL (08-21-18 @ 15:27)      Troponin I, Serum: .091 ng/mL (08-21-18 @ 15:27)

## 2018-08-22 NOTE — DISCHARGE NOTE ADULT - MEDICATION SUMMARY - MEDICATIONS TO TAKE
I will START or STAY ON the medications listed below when I get home from the hospital:    Aspir 81 oral delayed release tablet  -- 1 tab(s) by mouth once a day  -- Indication: For CAD    lisinopril 5 mg oral tablet  -- 1 tab(s) by mouth once a day MDD:Hold for SBP < 100  -- Indication: For HTN/CHF    Eliquis 5 mg oral tablet  -- 1 tab(s) by mouth 2 times a day  -- Indication: For Afib    metoprolol succinate 50 mg oral tablet, extended release  -- 1 tab(s) by mouth once a day  -- Indication: For Afib/HTN    cefuroxime 500 mg oral tablet  -- 1 tab(s) by mouth 2 times a day   -- Finish all this medication unless otherwise directed by prescriber.  Medication should be taken with plenty of water.  Take with food or milk.    -- Indication: For PnA    Lasix 40 mg oral tablet  -- 1 tab(s) by mouth once a day  -- Indication: For ChF    azithromycin 250 mg oral tablet  -- 1 cap(s) by mouth once a day   -- Do not take dairy products, antacids, or iron preparations within one hour of this medication.  Finish all this medication unless otherwise directed by prescriber.    -- Indication: For PnA

## 2018-08-22 NOTE — DISCHARGE NOTE ADULT - NS AS ACTIVITY OBS
Bathing allowed/Walking-Indoors allowed/Walking-Outdoors allowed/No Heavy lifting/straining/Showering allowed

## 2018-08-22 NOTE — PROGRESS NOTE ADULT - PROBLEM SELECTOR PLAN 3
CT chest - possible atypical PNA, pt. has a cough, afebrile, no leukocytosis  Ceftin and zithromax x 5   Refused inhalers

## 2019-03-16 ENCOUNTER — EMERGENCY (EMERGENCY)
Facility: HOSPITAL | Age: 84
LOS: 1 days | Discharge: HOME | End: 2019-03-16
Attending: EMERGENCY MEDICINE | Admitting: EMERGENCY MEDICINE
Payer: MEDICARE

## 2019-03-16 VITALS
WEIGHT: 175.05 LBS | SYSTOLIC BLOOD PRESSURE: 197 MMHG | OXYGEN SATURATION: 96 % | RESPIRATION RATE: 17 BRPM | DIASTOLIC BLOOD PRESSURE: 72 MMHG | HEIGHT: 66 IN | TEMPERATURE: 98 F | HEART RATE: 77 BPM

## 2019-03-16 DIAGNOSIS — S49.90XA UNSPECIFIED INJURY OF SHOULDER AND UPPER ARM, UNSPECIFIED ARM, INITIAL ENCOUNTER: ICD-10-CM

## 2019-03-16 PROCEDURE — 99284 EMERGENCY DEPT VISIT MOD MDM: CPT | Mod: 25

## 2019-03-16 PROCEDURE — 73030 X-RAY EXAM OF SHOULDER: CPT | Mod: 26,RT

## 2019-03-16 PROCEDURE — 70450 CT HEAD/BRAIN W/O DYE: CPT

## 2019-03-16 PROCEDURE — 99284 EMERGENCY DEPT VISIT MOD MDM: CPT

## 2019-03-16 PROCEDURE — 70450 CT HEAD/BRAIN W/O DYE: CPT | Mod: 26

## 2019-03-16 PROCEDURE — 73030 X-RAY EXAM OF SHOULDER: CPT

## 2019-03-16 RX ORDER — ACETAMINOPHEN 500 MG
650 TABLET ORAL ONCE
Qty: 0 | Refills: 0 | Status: COMPLETED | OUTPATIENT
Start: 2019-03-16 | End: 2019-03-16

## 2019-03-16 RX ADMIN — Medication 650 MILLIGRAM(S): at 20:10

## 2019-03-16 NOTE — ED PROVIDER NOTE - OBJECTIVE STATEMENT
Pertinent PMH/PSH/FHx/SHx and Review of Systems contained within:  86 y/o male with h/o afib BIB EMS from a restaurant c/o right shoulder pain s/p fall. Pt states he was walking out of restaurant , bumped in to door and fell, no LOC, no neck pain

## 2019-03-16 NOTE — ED ADULT TRIAGE NOTE - CHIEF COMPLAINT QUOTE
He was walking out of a restaurant and the door hit him in the face and he fell and hurt his right shoulder, no LOC

## 2019-03-16 NOTE — ED ADULT NURSE NOTE - NSIMPLEMENTINTERV_GEN_ALL_ED
Implemented All Universal Safety Interventions:  [place] to call system. Call bell, personal items and telephone within reach. Instruct patient to call for assistance. Room bathroom lighting operational. Non-slip footwear when patient is off stretcher. Physically safe environment: no spills, clutter or unnecessary equipment. Stretcher in lowest position, wheels locked, appropriate side rails in place.

## 2019-03-16 NOTE — ED ADULT NURSE NOTE - OBJECTIVE STATEMENT
Pt came in stating he got hit with the door in the head when walking out of a restaurant about an hour ago, fell and now has right shoulder pain. pt states he did not lose consciousness. pt denies any dizziness, headache, n/v/d, SOB, chest pain or any other complaints at this time.

## 2019-03-16 NOTE — ED PROVIDER NOTE - CLINICAL SUMMARY MEDICAL DECISION MAKING FREE TEXT BOX
pt s/p trip and fall, fx of greater tuberosity of right humerus, given sling and pain meds, follow up with orthopedic

## 2019-03-17 PROBLEM — I10 ESSENTIAL (PRIMARY) HYPERTENSION: Chronic | Status: ACTIVE | Noted: 2018-08-20

## 2019-03-17 PROBLEM — I50.9 HEART FAILURE, UNSPECIFIED: Chronic | Status: ACTIVE | Noted: 2018-08-20

## 2019-03-17 PROBLEM — I48.91 UNSPECIFIED ATRIAL FIBRILLATION: Chronic | Status: ACTIVE | Noted: 2018-08-20

## 2019-03-17 PROBLEM — I63.9 CEREBRAL INFARCTION, UNSPECIFIED: Chronic | Status: ACTIVE | Noted: 2018-08-20

## 2019-05-31 ENCOUNTER — INPATIENT (INPATIENT)
Facility: HOSPITAL | Age: 84
LOS: 2 days | Discharge: HOME | DRG: 280 | End: 2019-06-03
Attending: HOSPITALIST | Admitting: HOSPITALIST
Payer: MEDICARE

## 2019-05-31 VITALS
WEIGHT: 177.03 LBS | DIASTOLIC BLOOD PRESSURE: 81 MMHG | TEMPERATURE: 98 F | RESPIRATION RATE: 21 BRPM | SYSTOLIC BLOOD PRESSURE: 197 MMHG | OXYGEN SATURATION: 94 % | HEART RATE: 99 BPM

## 2019-05-31 DIAGNOSIS — I21.4 NON-ST ELEVATION (NSTEMI) MYOCARDIAL INFARCTION: ICD-10-CM

## 2019-05-31 LAB
ALBUMIN SERPL ELPH-MCNC: 3.4 G/DL — SIGNIFICANT CHANGE UP (ref 3.3–5)
ALP SERPL-CCNC: 60 U/L — SIGNIFICANT CHANGE UP (ref 40–120)
ALT FLD-CCNC: 24 U/L DA — SIGNIFICANT CHANGE UP (ref 10–45)
ANION GAP SERPL CALC-SCNC: 10 MMOL/L — SIGNIFICANT CHANGE UP (ref 5–17)
APTT BLD: 28.2 SEC — SIGNIFICANT CHANGE UP (ref 27.5–36.3)
AST SERPL-CCNC: 39 U/L — SIGNIFICANT CHANGE UP (ref 10–40)
BASOPHILS # BLD AUTO: 0.04 K/UL — SIGNIFICANT CHANGE UP (ref 0–0.2)
BASOPHILS NFR BLD AUTO: 0.3 % — SIGNIFICANT CHANGE UP (ref 0–2)
BILIRUB SERPL-MCNC: 0.4 MG/DL — SIGNIFICANT CHANGE UP (ref 0.2–1.2)
BUN SERPL-MCNC: 10 MG/DL — SIGNIFICANT CHANGE UP (ref 7–23)
CALCIUM SERPL-MCNC: 8.9 MG/DL — SIGNIFICANT CHANGE UP (ref 8.4–10.5)
CHLORIDE SERPL-SCNC: 107 MMOL/L — SIGNIFICANT CHANGE UP (ref 96–108)
CO2 SERPL-SCNC: 26 MMOL/L — SIGNIFICANT CHANGE UP (ref 22–31)
CREAT SERPL-MCNC: 1.06 MG/DL — SIGNIFICANT CHANGE UP (ref 0.5–1.3)
EOSINOPHIL # BLD AUTO: 0.31 K/UL — SIGNIFICANT CHANGE UP (ref 0–0.5)
EOSINOPHIL NFR BLD AUTO: 2.5 % — SIGNIFICANT CHANGE UP (ref 0–6)
GLUCOSE SERPL-MCNC: 197 MG/DL — HIGH (ref 70–99)
HCT VFR BLD CALC: 39.5 % — SIGNIFICANT CHANGE UP (ref 39–50)
HGB BLD-MCNC: 13.2 G/DL — SIGNIFICANT CHANGE UP (ref 13–17)
IMM GRANULOCYTES NFR BLD AUTO: 0.7 % — SIGNIFICANT CHANGE UP (ref 0–1.5)
INR BLD: 1.09 RATIO — SIGNIFICANT CHANGE UP (ref 0.88–1.16)
LACTATE SERPL-SCNC: 1.3 MMOL/L — SIGNIFICANT CHANGE UP (ref 0.7–2)
LYMPHOCYTES # BLD AUTO: 1.54 K/UL — SIGNIFICANT CHANGE UP (ref 1–3.3)
LYMPHOCYTES # BLD AUTO: 12.4 % — LOW (ref 13–44)
MCHC RBC-ENTMCNC: 32.8 PG — SIGNIFICANT CHANGE UP (ref 27–34)
MCHC RBC-ENTMCNC: 33.4 GM/DL — SIGNIFICANT CHANGE UP (ref 32–36)
MCV RBC AUTO: 98 FL — SIGNIFICANT CHANGE UP (ref 80–100)
MONOCYTES # BLD AUTO: 1.18 K/UL — HIGH (ref 0–0.9)
MONOCYTES NFR BLD AUTO: 9.5 % — SIGNIFICANT CHANGE UP (ref 2–14)
NEUTROPHILS # BLD AUTO: 9.29 K/UL — HIGH (ref 1.8–7.4)
NEUTROPHILS NFR BLD AUTO: 74.6 % — SIGNIFICANT CHANGE UP (ref 43–77)
NRBC # BLD: 0 /100 WBCS — SIGNIFICANT CHANGE UP (ref 0–0)
NT-PROBNP SERPL-SCNC: 3005 PG/ML — HIGH (ref 0–300)
PLATELET # BLD AUTO: 210 K/UL — SIGNIFICANT CHANGE UP (ref 150–400)
POTASSIUM SERPL-MCNC: 4.1 MMOL/L — SIGNIFICANT CHANGE UP (ref 3.5–5.3)
POTASSIUM SERPL-SCNC: 4.1 MMOL/L — SIGNIFICANT CHANGE UP (ref 3.5–5.3)
PROCALCITONIN SERPL-MCNC: 0.16 NG/ML — HIGH
PROT SERPL-MCNC: 7.3 G/DL — SIGNIFICANT CHANGE UP (ref 6–8.3)
PROTHROM AB SERPL-ACNC: 12.2 SEC — SIGNIFICANT CHANGE UP (ref 10–12.9)
RBC # BLD: 4.03 M/UL — LOW (ref 4.2–5.8)
RBC # FLD: 13.8 % — SIGNIFICANT CHANGE UP (ref 10.3–14.5)
SODIUM SERPL-SCNC: 143 MMOL/L — SIGNIFICANT CHANGE UP (ref 135–145)
TROPONIN I SERPL-MCNC: 0.09 NG/ML — HIGH (ref 0.02–0.06)
TROPONIN I SERPL-MCNC: 0.11 NG/ML — HIGH (ref 0.02–0.06)
WBC # BLD: 12.45 K/UL — HIGH (ref 3.8–10.5)
WBC # FLD AUTO: 12.45 K/UL — HIGH (ref 3.8–10.5)

## 2019-05-31 PROCEDURE — 99291 CRITICAL CARE FIRST HOUR: CPT

## 2019-05-31 PROCEDURE — 99223 1ST HOSP IP/OBS HIGH 75: CPT

## 2019-05-31 PROCEDURE — 71045 X-RAY EXAM CHEST 1 VIEW: CPT | Mod: 26

## 2019-05-31 PROCEDURE — 99223 1ST HOSP IP/OBS HIGH 75: CPT | Mod: AI

## 2019-05-31 PROCEDURE — 93010 ELECTROCARDIOGRAM REPORT: CPT

## 2019-05-31 RX ORDER — APIXABAN 2.5 MG/1
1 TABLET, FILM COATED ORAL
Qty: 0 | Refills: 0 | DISCHARGE

## 2019-05-31 RX ORDER — ASPIRIN/CALCIUM CARB/MAGNESIUM 324 MG
1 TABLET ORAL
Qty: 0 | Refills: 0 | DISCHARGE

## 2019-05-31 RX ORDER — NITROGLYCERIN 6.5 MG
0.4 CAPSULE, EXTENDED RELEASE ORAL ONCE
Refills: 0 | Status: COMPLETED | OUTPATIENT
Start: 2019-05-31 | End: 2019-05-31

## 2019-05-31 RX ORDER — LOSARTAN POTASSIUM 100 MG/1
100 TABLET, FILM COATED ORAL DAILY
Refills: 0 | Status: DISCONTINUED | OUTPATIENT
Start: 2019-06-01 | End: 2019-06-03

## 2019-05-31 RX ORDER — ASPIRIN/CALCIUM CARB/MAGNESIUM 324 MG
81 TABLET ORAL DAILY
Refills: 0 | Status: DISCONTINUED | OUTPATIENT
Start: 2019-05-31 | End: 2019-06-03

## 2019-05-31 RX ORDER — HEPARIN SODIUM 5000 [USP'U]/ML
5000 INJECTION INTRAVENOUS; SUBCUTANEOUS EVERY 8 HOURS
Refills: 0 | Status: DISCONTINUED | OUTPATIENT
Start: 2019-05-31 | End: 2019-06-01

## 2019-05-31 RX ORDER — AMLODIPINE BESYLATE 2.5 MG/1
5 TABLET ORAL
Refills: 0 | Status: DISCONTINUED | OUTPATIENT
Start: 2019-06-01 | End: 2019-06-03

## 2019-05-31 RX ORDER — ASPIRIN/CALCIUM CARB/MAGNESIUM 324 MG
162 TABLET ORAL ONCE
Refills: 0 | Status: COMPLETED | OUTPATIENT
Start: 2019-05-31 | End: 2019-05-31

## 2019-05-31 RX ORDER — ACETAMINOPHEN 500 MG
650 TABLET ORAL EVERY 4 HOURS
Refills: 0 | Status: DISCONTINUED | OUTPATIENT
Start: 2019-05-31 | End: 2019-06-03

## 2019-05-31 RX ORDER — LABETALOL HCL 100 MG
10 TABLET ORAL ONCE
Refills: 0 | Status: DISCONTINUED | OUTPATIENT
Start: 2019-05-31 | End: 2019-05-31

## 2019-05-31 RX ORDER — FUROSEMIDE 40 MG
1 TABLET ORAL
Qty: 0 | Refills: 0 | DISCHARGE

## 2019-05-31 RX ORDER — LABETALOL HCL 100 MG
10 TABLET ORAL ONCE
Refills: 0 | Status: COMPLETED | OUTPATIENT
Start: 2019-05-31 | End: 2019-05-31

## 2019-05-31 RX ORDER — FUROSEMIDE 40 MG
40 TABLET ORAL
Refills: 0 | Status: DISCONTINUED | OUTPATIENT
Start: 2019-05-31 | End: 2019-06-03

## 2019-05-31 RX ORDER — FUROSEMIDE 40 MG
40 TABLET ORAL ONCE
Refills: 0 | Status: COMPLETED | OUTPATIENT
Start: 2019-05-31 | End: 2019-05-31

## 2019-05-31 RX ORDER — METOPROLOL TARTRATE 50 MG
25 TABLET ORAL DAILY
Refills: 0 | Status: DISCONTINUED | OUTPATIENT
Start: 2019-06-01 | End: 2019-06-01

## 2019-05-31 RX ADMIN — Medication 40 MILLIGRAM(S): at 21:26

## 2019-05-31 RX ADMIN — Medication 162 MILLIGRAM(S): at 13:39

## 2019-05-31 RX ADMIN — Medication 40 MILLIGRAM(S): at 13:40

## 2019-05-31 RX ADMIN — Medication 0.4 MILLIGRAM(S): at 14:30

## 2019-05-31 RX ADMIN — HEPARIN SODIUM 5000 UNIT(S): 5000 INJECTION INTRAVENOUS; SUBCUTANEOUS at 21:26

## 2019-05-31 RX ADMIN — Medication 10 MILLIGRAM(S): at 14:30

## 2019-05-31 NOTE — ED PROVIDER NOTE - CRITICAL CARE PROVIDED
consultation with other physicians/additional history taking/direct patient care (not related to procedure)/consult w/ pt's family directly relating to pts condition/documentation/interpretation of diagnostic studies

## 2019-05-31 NOTE — ED ADULT TRIAGE NOTE - CHIEF COMPLAINT QUOTE
pt presents to ED with caregiver and caregiver states "he's been complaining of chest pain since last night, and hes not eating". pt denies active chest pain in triage.

## 2019-05-31 NOTE — H&P ADULT - ASSESSMENT
85M with hx CVA, AF (on ASA), CHFrEF, medication noncompliance, presents with SOB, chest pressure, HTN urgency, CHF exacerbation    #Acute on chronic systolic CHF  -EF 37% in Aug 2018  -Repeat echo  -Serial troponins  -Daily weights  -Is/Os  -ARB  -start low dose beta blocker   -IV diuresis  -check A1C and statin  -Consider stress test pending clinical course    #chronic AF  -CHADS2 score of 5, high risk for CVA, will need to discuss with cardiology why patient not on full dose A/C  -c/w ASA for now, consider starting Eliquis if AF non-valvular on pending echo    #HTN urgency  -resume home medications, s/p IV labetalol (10 mg) and SL NTG in the ED    IMPROVE VTE Individual Risk Assessment          RISK                                                          Points  [  ] Previous VTE                                                3  [  ] Thrombophilia                                             2  [  ] Lower limb paralysis                                   2        (unable to hold up >15 seconds)    [  ] Current Cancer                                             2         (within 6 months)  [  ] Immobilization > 24 hrs                              1  [  ] ICU/CCU stay > 24 hours                             1  [x  ] Age > 60                                                         1    IMPROVE VTE Score: 1, HSQ ordered      GOC: Patient is FULL CODE 85M with hx CVA, AF (on ASA), CHFrEF, medication noncompliance, presents with SOB, chest pressure, HTN urgency, CHF exacerbation    #Acute on chronic systolic CHF  -EF 37% in Aug 2018  -Repeat echo  -Serial troponins  -Daily weights  -Is/Os  -ARB  -start low dose beta blocker   -IV diuresis  -check A1C and statin  -Consider stress test pending clinical course    #chronic AF  -CHADS2 score of 5, high risk for CVA, will need to discuss with cardiology why patient not on full dose A/C  -c/w ASA for now, consider starting Eliquis if AF non-valvular on pending echo    #HTN urgency  -resume home medications, s/p IV labetalol (10 mg) and SL NTG in the ED    IMPROVE VTE Individual Risk Assessment          RISK                                                          Points  [  ] Previous VTE                                                3  [  ] Thrombophilia                                             2  [  ] Lower limb paralysis                                   2        (unable to hold up >15 seconds)    [  ] Current Cancer                                             2         (within 6 months)  [  ] Immobilization > 24 hrs                              1  [  ] ICU/CCU stay > 24 hours                             1  [x  ] Age > 60                                                         1    IMPROVE VTE Score: 1, HSQ ordered      GOC: Patient is FULL CODE    Case d/w daughter, Lora, [phone]

## 2019-05-31 NOTE — H&P ADULT - NSICDXPASTMEDICALHX_GEN_ALL_CORE_FT
PAST MEDICAL HISTORY:  Afib     Congestive heart failure (CHF)     CVA (cerebral vascular accident)     Hypertension, unspecified type

## 2019-05-31 NOTE — H&P ADULT - NSHPPHYSICALEXAM_GEN_ALL_CORE
Vital Signs Last 24 Hrs  T(F): 98.4 (31 May 2019 15:28), Max: 99.6 (31 May 2019 12:36)  HR: 66 (31 May 2019 15:28) (62 - 99)  BP: 143/66 (31 May 2019 15:28) (143/66 - 228/106)  RR: 18 (31 May 2019 15:28) (18 - 21)  SpO2: 98% (31 May 2019 15:28) (94% - 100%)    PHYSICAL EXAM:  GENERAL: NAD, well-groomed, well-developed  HEAD:  Atraumatic, Normocephalic  EYES: EOMI, conjunctiva and sclera clear  ENMT: Dry mucous membranes, Good dentition, no thrush  NECK: Supple, + JVD  CHEST/LUNG: Bibasilar crackles, fair air entry, non-labored breathing  HEART: IRRR; S1/S2, + systolic murmur  ABDOMEN: Soft, Nontender, Nondistended; Bowel sounds present; obese  VASCULAR: Normal pulses, Normal capillary refill  EXTREMITIES: No calf tenderness, No cyanosis, 2+ pitting lower extremity edema  LYMPH: No lymphadenopathy noted  SKIN: Warm, Intact  PSYCH: Normal mood, Normal affect  NERVOUS SYSTEM:  A/O x3, Good concentration; No focal deficits

## 2019-05-31 NOTE — CONSULT NOTE ADULT - SUBJECTIVE AND OBJECTIVE BOX
CHIEF COMPLAINT:  Patient is a 85y old  Male who presents with a chief complaint of Chest pain (31 May 2019 15:37)    HPI:  85M hx CVA, CHF, AF, a/w persistent chest pain x 18 hours, substernal, "pressure" sensation, started while sitting in a chair last night, slightly improved today, in setting of high blood pressure. Patient states he is compliant with medications.  No N/V/D. + SOB at worse with exertion. Unknown if weight gain. No headache. No blurry vision.       ****SPOKE TO DAUGHTERDERREK - WHO STATES PATIENT IS NEVER COMPLIANT WITH MEDICATIONS  ****SPOKE TO PHARMACY - LAST FILL ON  DAY SUPPLY OF BP MEDS WAS IN MARCH (31 May 2019 15:37)    Review of records...history of CAD, last cath non obstructive. HBP, AF, CHF.  Moderate LV dysfunction by echo.    Denies medication or dietary non compliance. Above noted per daughter.      PMH:   Hypertension, unspecified type  CVA (cerebral vascular accident)  Congestive heart failure (CHF)  Afib      PSH:   No significant past surgical history    FAMILY HISTORY:  No pertinent family history in first degree relatives      SOCIAL HISTORY:  Smoking:  no  Alcohol:   no  Drugs:    ALLERGIES:  No Known Allergies      Home Medications:  aspirin 81 mg oral tablet: 1 tab(s) orally once a day (31 May 2019 15:50)  irbesartan 300 mg oral tablet: 1 tab(s) orally once a day (31 May 2019 15:50)  Lasix 40 mg oral tablet: 1 tab(s) orally once a day (31 May 2019 15:50)  Norvasc 5 mg oral tablet: 1 tab(s) orally once a day (31 May 2019 15:50)      MEDICATIONS:  acetaminophen   Tablet .. 650 milliGRAM(s) Oral every 4 hours PRN  aspirin enteric coated 81 milliGRAM(s) Oral daily  furosemide   Injectable 40 milliGRAM(s) IV Push two times a day  heparin  Injectable 5000 Unit(s) SubCutaneous every 8 hours      REVIEW OF SYSTEMS:  CONSTITUTIONAL: No fever, weight loss, or fatigue  EYES: No eye pain, visual disturbances, or discharge  ENMT:  No difficulty hearing, tinnitus, vertigo; No sinus or throat pain  NECK: No pain or stiffness  BREASTS: No pain, masses, or nipple discharge  RESPIRATORY: No cough, wheezing, chills or hemoptysis; No shortness of breath  CARDIOVASCULAR: No chest pain, palpitations, dizziness, or leg swelling  GASTROINTESTINAL: No abdominal or epigastric pain. No nausea, vomiting, or hematemesis; No diarrhea or constipation. No melena or hematochezia.  GENITOURINARY: No dysuria, frequency, hematuria, or incontinence  NEUROLOGICAL: weakness due to stroke  SKIN: No itching, burning, rashes, or lesions   LYMPH NODES: No enlarged glands  ENDOCRINE: No heat or cold intolerance; No hair loss  MUSCULOSKELETAL: No joint pain or swelling; No muscle, back, or extremity pain  PSYCHIATRIC: No depression, anxiety, mood swings, or difficulty sleeping  HEME/LYMPH: No easy bruising, or bleeding gums  ALLERGY AND IMMUNOLOGIC: No hives or eczema    PHYSICAL EXAM:  T(C): 36.9 (19 @ 15:28), Max: 37.6 (19 @ 12:36)  HR: 66 (19 @ 15:28) (62 - 99)  BP: 143/66 (19 @ 15:28) (143/66 - 228/106)  RR: 18 (19 @ 15:28) (18 - 21)  SpO2: 98% (19 @ 15:28) (94% - 100%)  Wt(kg): --    GENERAL: NAD, well-groomed, well-developed  HEAD:  Atraumatic, Normocephalic  EYES: EOMI, conjunctiva and sclera clear  ENT: Moist mucous membranes,  NECK: Supple, + JVD, no bruits  CHEST/LUNG: Bilateral basal rales 1/4  HEART: Irregular, no gallop or significant murmur  ABDOMEN: Soft, Nontender, Nondistended; Bowel sounds present  EXTREMITIES:   No clubbing, cyanosis. 1+ edema  SKIN: No rashes or lesions  NERVOUS SYSTEM:  Alert     Cardiovascular Diagnostic Testing:  ECG:    AF LVH with st-t change    LABS:                        13.2   12.45 )-----------( 210      ( 31 May 2019 12:35 )             39.5         143  |  107  |  10  ----------------------------<  197<H>  4.1   |  26  |  1.06    Ca    8.9      31 May 2019 12:35    TPro  7.3  /  Alb  3.4  /  TBili  0.4  /  DBili  x   /  AST  39  /  ALT  24  /  AlkPhos  60        CARDIAC MARKERS ( 31 May 2019 12:35 )  .106 ng/mL / x     / x     / x     / x          Serum Pro-Brain Natriuretic Peptide: 3005 pg/mL ( @ 12:35)      IMAGING:  CHF    < from: TTE Echo Complete w/Doppler (18 @ 08:15) >    EXAM:  ECHO TTE WO CON COMP [id number]      PROCEDURE DATE:  2018        INTERPRETATION:  REPORT:    TRANSTHORACIC ECHOCARDIOGRAM REPORT         Patient Name:   RAD SINGLETON Patient Location: [address] Rec #:  [id number]            Accession #:      [id number]  Account #:      [id number]             Height:           67.0 in 170.2 cm  YOB: 1934          Weight:           175.0 lb 79.40 kg  Patient Age:    84 years           BSA:              1.91 m²  Patient Gender: M               BP:               117/53 mmHg       Date of Exam:        2018 8:15:31 AM  Sonographer:         FT  Copy report sent to: [facility]    Procedure:     2D Echo/Doppler/Color Doppler Complete.  Indications:   Heart failure, unspecified - I50.9  Diagnosis:     Heart failure, unspecified - I50.9  Study Details: Technically good study.         2D AND M-MODE MEASUREMENTS (normal ranges within parentheses):  Left                 Normal   Aorta/Left            Normal  Ventricle:       Atrium:  IVSd (2D):    1.64  (0.7-1.1) Aortic Root   3.51  (2.4-3.7)                 cm             (Mmode):       cm  LVPWd (2D):   1.25  (0.7-1.1) AoV Cusp      1.93  (1.5-2.6)                 cm             Separation:    cm  LVIDd (2D):   5.53  (3.4-5.7) Left Atrium   6.20  (1.9-4.0)                 cm             (Mmode):       cm  LVIDs (2D):   4.60                 cm  LV FS (2D):   16.9   (>25%)                  %  IVSd (Mmode): 1.49  (0.7-1.1)                 cm  LVPWd         1.10 (0.7-1.1)  (Mmode):       cm  LVIDd         5.84  (3.4-5.7)  (Mmode):       cm  LVIDs         4.67  (Mmode):       cm  LV FS         20.1   (>25%)  (Mmode):        %  Relative Wall 0.45   (<0.42)  Thickness  Rel. Wall     0.38   (<0.42)  Thickness Mm  LV Mass       174.1  Index: Mmode  g/m²    LV DIASTOLIC FUNCTION:  MV Peak E: 1.40 m/s Decel Time: 693 msec    SPECTRAL DOPPLER ANALYSIS (where applicable):  Mitral Valve:  MV P1/2 Time: 200.88 msec  MV Area, PHT: 1.10 cm²    Aortic Valve: AoV Max Ron: 1.82 m/s AoV Peak P.2 mmHg AoV Mean P.4 mmHg    LVOT Vmax:  LVOT VTI:  LVOT Diameter: 2.46 cm    AoV Area, Vmax:  AoV Area, VTI:  AoV Area, Vmn:  Ao VTI: 0.355  Aortic Insufficiency:  AI Half-time:  688 msec  AI Decel Rate: 1.71 m/s²    Tricuspid Valve and PA/RV Systolic Pressure: TR Max Velocity: 2.38 m/s RA   Pressure: 10 mmHg RVSP/PASP: 32.6 mmHg       PHYSICIAN INTERPRETATION:  Left Ventricle: The left ventricular internal cavity size is normal.  Global LV systolic function wasmoderately decreased. Left ventricular   ejection fraction, by visual estimation, is 37%.       LV Wall Scoring:  There is diffuse hypokinesis.    Left Atrium: Moderately enlarged left atrium. LA volume Index is 87.7   ml/m² ml/m2.  Right Atrium: Mildly enlarged right atrium.  Pericardium: Trivial pericardial effusion is present. The pericardial   effusion is posterior to the left ventricle.  Mitral Valve: Thickening and calcification of the anterior and posterior   mitral valve leaflets. Mobility is moderately decreased for both   leaflets. Moderate mitral valve stenosis.  Tricuspid Valve: Structurally normal tricuspid valve, with normal leaflet   excursion. Mild tricuspid regurgitation is visualized.  Aortic Valve: Sclerotic aortic valve with normal opening. Peak Av   velocity is 1.82 m/s, mean transaortic gradient equals 7.4 mmHg. The   aortic valve mean gradient is 7.4 mmHgconsistent with normally opening   aortic valve. Trivial aortic valve regurgitation is seen.  Pulmonic Valve: Structurally normal pulmonic valve, with normal leaflet   excursion. Mild pulmonic valve regurgitation.  Aorta: The aortic root is normal in size and structure.  Pulmonary Artery: The main pulmonary artery is normal in size.       Summary:   1. Left ventricular ejection fraction, by visual estimation, is 37%.   2. Moderately decreased global left ventricular systolic function.   3. Multiple left ventricular regional wall motion abnormalities exist.   See wall motion findings.   4. There is mild concentric left ventricular hypertrophy.   5. Moderate mitral stenosis.   6. Moderately decreased mitral leaflet mobility.   7. Thickening and calcification of the anterior and posterior mitral   valve leaflets.   8. Mild tricuspid regurgitation.   9. Sclerotic aortic valve with normal opening.  10. Pulmonic valve regurgitation.  11. LA volume Index is 87.7 ml/m² ml/m2.  12. The aortic valve mean gradient is 7.4 mmHg consistent with normally   opening aortic valve.    [id number] Kirill Bangura MD, FACC , Electronically signed on 2018 at   1:45:45 PM    < end of copied text

## 2019-05-31 NOTE — ED PROVIDER NOTE - ATTENDING CONTRIBUTION TO CARE
Dr. Monroe: I performed a face to face bedside interview with patient regarding history of present illness, review of symptoms and past medical history. I completed an independent physical exam.  I have discussed patient's plan of care with PA.   I agree with note as stated above, having amended the EMR as needed to reflect my findings.   This includes HISTORY OF PRESENT ILLNESS, HIV, PAST MEDICAL/SURGICAL/FAMILY/SOCIAL HISTORY, ALLERGIES AND HOME MEDICATIONS, REVIEW OF SYSTEMS, PHYSICAL EXAM, and any PROGRESS NOTES during the time I functioned as the attending physician for this patient.    see mdm

## 2019-05-31 NOTE — ED ADULT NURSE NOTE - INTEGUMENTARY WDL
Color consistent with ethnicity/race, warm, dry intact, resilient., carlos lower ext swelling, (+) pedal pulses noted

## 2019-05-31 NOTE — CONSULT NOTE ADULT - ASSESSMENT
Acute CHF, systolic and diastolic, in setting of uncontrolled HBP (?med noncompliance) CAD and AF  Prior CVA      Suggest BP control and rate control. Telemetry, serial ekg/enzymes. echo.     Oral a/c unless contraindicated. Lipids profile, statin.

## 2019-05-31 NOTE — H&P ADULT - HISTORY OF PRESENT ILLNESS
85M hx CVA, CHF, AF, a/w persistent chest pain x 18 hours, substernal, "pressure" sensation, started while sitting in a chair last night, slightly improved today, in setting of high blood pressure. Patient states he is compliant with medications.  No N/V/D. + SOB at worse with exertion. Unknown if weight gain. No headache. No blurry vision.       ****SPOKE TO DAUGHTERDERREK - WHO STATES PATIENT IS NEVER COMPLIANT WITH MEDICATIONS  ****SPOKE TO PHARMACY - LAST FILL ON 30 DAY SUPPLY OF BP MEDS WAS IN MARCH

## 2019-05-31 NOTE — H&P ADULT - NSHPLABSRESULTS_GEN_ALL_CORE
.                            13.2   12.45 )-----------( 210      ( 31 May 2019 12:35 )             39.5     Lactate, Blood: 1.3 mmol/L (05-31 @ 13:00)    05-31    143  |  107  |  10  ----------------------------<  197  4.1   |  26  |  1.06    Ca    8.9      31 May 2019 12:35    TPro  7.3  /  Alb  3.4  /  TBili  0.4  /  DBili  x   /  AST  39  /  ALT  24  /  AlkPhos  60  05-31      CARDIAC MARKERS ( 31 May 2019 12:35 )  .106 ng/mL / x     / x     / x     / x          Serum Pro-Brain Natriuretic Peptide: 3005 pg/mL (05-31-19 @ 12:35)      < from: Xray Chest 1 View AP/PA (05.31.19 @ 12:44) >    IMPRESSION: Mild central congestion and small left base effusion are   increased from prior. Heart enlargement again noted.    < end of copied text >    EKG: AF at 79 - reviewed by me personally    Case d/w Dr. Quinones

## 2019-05-31 NOTE — ED PROVIDER NOTE - CARE PLAN
Principal Discharge DX:	NSTEMI (non-ST elevated myocardial infarction)  Secondary Diagnosis:	Acute congestive heart failure, unspecified heart failure type Principal Discharge DX:	NSTEMI (non-ST elevated myocardial infarction)  Secondary Diagnosis:	Acute congestive heart failure, unspecified heart failure type  Secondary Diagnosis:	Hypertensive emergency

## 2019-05-31 NOTE — ED ADULT NURSE REASSESSMENT NOTE - GENERAL PATIENT STATE
comfortable appearance/patient placed on oxygen via nasal cannula at 2.5 L/min. Patient is short of breath

## 2019-05-31 NOTE — ED PROVIDER NOTE - CLINICAL SUMMARY MEDICAL DECISION MAKING FREE TEXT BOX
Dr. Monroe: 79M h/o Afib on Eliquis, CHF on lasix 40mg, CAD, HTN, HLD p/w left chest pressure since this AM with SOB, non radiating, also c/o generalized weakness. +current smoker, +dry cough for weeks, no fevers or chills. On exam pt with dec breath sounds bilaterally with 2+ pitting edema BLE. CXR, labs and exam consistent with CHF and ACS. Despite elevation in WBC and procalcitonin, sepsis unlikely, pt afebrile, lactate normal, giving fluids would be detrimental to his condition. Discussed with Dr. Balbuena, requests reduction in BP for hypertensive emergency, BP decreased to 180s (10 points) with no intervention, will repeat after laxix. Dr. Monroe: 85M h/o Afib on Eliquis, CHF on lasix 40mg, CAD, HTN, HLD p/w left chest pressure since this AM with SOB, non radiating, also c/o generalized weakness. +current smoker, +dry cough for weeks, no fevers or chills. On exam pt with dec breath sounds bilaterally with 2+ pitting edema BLE. CXR, labs and exam consistent with CHF and ACS. Despite elevation in WBC and procalcitonin, sepsis unlikely, pt afebrile, lactate normal, giving fluids would be detrimental to his condition. Discussed with Dr. Balbuena, requests reduction in BP for hypertensive emergency, BP decreased to 180s (10 points) with no intervention, will repeat after laxix.

## 2019-06-01 LAB
ANION GAP SERPL CALC-SCNC: 13 MMOL/L — SIGNIFICANT CHANGE UP (ref 5–17)
BASOPHILS # BLD AUTO: 0.04 K/UL — SIGNIFICANT CHANGE UP (ref 0–0.2)
BASOPHILS NFR BLD AUTO: 0.3 % — SIGNIFICANT CHANGE UP (ref 0–2)
BUN SERPL-MCNC: 14 MG/DL — SIGNIFICANT CHANGE UP (ref 7–23)
CALCIUM SERPL-MCNC: 8.6 MG/DL — SIGNIFICANT CHANGE UP (ref 8.4–10.5)
CHLORIDE SERPL-SCNC: 103 MMOL/L — SIGNIFICANT CHANGE UP (ref 96–108)
CHOLEST SERPL-MCNC: 171 MG/DL — SIGNIFICANT CHANGE UP (ref 10–199)
CO2 SERPL-SCNC: 28 MMOL/L — SIGNIFICANT CHANGE UP (ref 22–31)
CREAT SERPL-MCNC: 1.17 MG/DL — SIGNIFICANT CHANGE UP (ref 0.5–1.3)
EOSINOPHIL # BLD AUTO: 0.21 K/UL — SIGNIFICANT CHANGE UP (ref 0–0.5)
EOSINOPHIL NFR BLD AUTO: 1.7 % — SIGNIFICANT CHANGE UP (ref 0–6)
GLUCOSE SERPL-MCNC: 96 MG/DL — SIGNIFICANT CHANGE UP (ref 70–99)
HBA1C BLD-MCNC: 5.3 % — SIGNIFICANT CHANGE UP (ref 4–5.6)
HCT VFR BLD CALC: 40.1 % — SIGNIFICANT CHANGE UP (ref 39–50)
HDLC SERPL-MCNC: 51 MG/DL — SIGNIFICANT CHANGE UP
HGB BLD-MCNC: 13.3 G/DL — SIGNIFICANT CHANGE UP (ref 13–17)
IMM GRANULOCYTES NFR BLD AUTO: 0.6 % — SIGNIFICANT CHANGE UP (ref 0–1.5)
LIPID PNL WITH DIRECT LDL SERPL: 107 MG/DL — HIGH
LYMPHOCYTES # BLD AUTO: 1.4 K/UL — SIGNIFICANT CHANGE UP (ref 1–3.3)
LYMPHOCYTES # BLD AUTO: 11.6 % — LOW (ref 13–44)
MAGNESIUM SERPL-MCNC: 1.6 MG/DL — SIGNIFICANT CHANGE UP (ref 1.6–2.6)
MCHC RBC-ENTMCNC: 32.7 PG — SIGNIFICANT CHANGE UP (ref 27–34)
MCHC RBC-ENTMCNC: 33.2 GM/DL — SIGNIFICANT CHANGE UP (ref 32–36)
MCV RBC AUTO: 98.5 FL — SIGNIFICANT CHANGE UP (ref 80–100)
MONOCYTES # BLD AUTO: 1.56 K/UL — HIGH (ref 0–0.9)
MONOCYTES NFR BLD AUTO: 12.9 % — SIGNIFICANT CHANGE UP (ref 2–14)
NEUTROPHILS # BLD AUTO: 8.81 K/UL — HIGH (ref 1.8–7.4)
NEUTROPHILS NFR BLD AUTO: 72.9 % — SIGNIFICANT CHANGE UP (ref 43–77)
NRBC # BLD: 0 /100 WBCS — SIGNIFICANT CHANGE UP (ref 0–0)
PHOSPHATE SERPL-MCNC: 4.5 MG/DL — SIGNIFICANT CHANGE UP (ref 2.5–4.5)
PLATELET # BLD AUTO: 179 K/UL — SIGNIFICANT CHANGE UP (ref 150–400)
POTASSIUM SERPL-MCNC: 3.4 MMOL/L — LOW (ref 3.5–5.3)
POTASSIUM SERPL-SCNC: 3.4 MMOL/L — LOW (ref 3.5–5.3)
RBC # BLD: 4.07 M/UL — LOW (ref 4.2–5.8)
RBC # FLD: 13.7 % — SIGNIFICANT CHANGE UP (ref 10.3–14.5)
SODIUM SERPL-SCNC: 144 MMOL/L — SIGNIFICANT CHANGE UP (ref 135–145)
TOTAL CHOLESTEROL/HDL RATIO MEASUREMENT: 3.4 RATIO — SIGNIFICANT CHANGE UP (ref 3.4–9.6)
TRIGL SERPL-MCNC: 63 MG/DL — SIGNIFICANT CHANGE UP (ref 10–149)
TROPONIN I SERPL-MCNC: 0.08 NG/ML — HIGH (ref 0.02–0.06)
TROPONIN I SERPL-MCNC: 0.08 NG/ML — HIGH (ref 0.02–0.06)
WBC # BLD: 12.09 K/UL — HIGH (ref 3.8–10.5)
WBC # FLD AUTO: 12.09 K/UL — HIGH (ref 3.8–10.5)

## 2019-06-01 PROCEDURE — 93306 TTE W/DOPPLER COMPLETE: CPT | Mod: 26

## 2019-06-01 PROCEDURE — 93010 ELECTROCARDIOGRAM REPORT: CPT

## 2019-06-01 PROCEDURE — 99233 SBSQ HOSP IP/OBS HIGH 50: CPT | Mod: GC

## 2019-06-01 PROCEDURE — 99232 SBSQ HOSP IP/OBS MODERATE 35: CPT

## 2019-06-01 RX ORDER — POTASSIUM CHLORIDE 20 MEQ
40 PACKET (EA) ORAL ONCE
Refills: 0 | Status: COMPLETED | OUTPATIENT
Start: 2019-06-01 | End: 2019-06-01

## 2019-06-01 RX ORDER — METOPROLOL TARTRATE 50 MG
25 TABLET ORAL DAILY
Refills: 0 | Status: DISCONTINUED | OUTPATIENT
Start: 2019-06-01 | End: 2019-06-03

## 2019-06-01 RX ORDER — DOCUSATE SODIUM 100 MG
100 CAPSULE ORAL DAILY
Refills: 0 | Status: DISCONTINUED | OUTPATIENT
Start: 2019-06-01 | End: 2019-06-01

## 2019-06-01 RX ORDER — APIXABAN 2.5 MG/1
5 TABLET, FILM COATED ORAL EVERY 12 HOURS
Refills: 0 | Status: DISCONTINUED | OUTPATIENT
Start: 2019-06-01 | End: 2019-06-03

## 2019-06-01 RX ORDER — MAGNESIUM SULFATE 500 MG/ML
1 VIAL (ML) INJECTION ONCE
Refills: 0 | Status: COMPLETED | OUTPATIENT
Start: 2019-06-01 | End: 2019-06-01

## 2019-06-01 RX ORDER — DOCUSATE SODIUM 100 MG
100 CAPSULE ORAL ONCE
Refills: 0 | Status: COMPLETED | OUTPATIENT
Start: 2019-06-01 | End: 2019-06-01

## 2019-06-01 RX ORDER — SENNA PLUS 8.6 MG/1
2 TABLET ORAL ONCE
Refills: 0 | Status: COMPLETED | OUTPATIENT
Start: 2019-06-01 | End: 2019-06-01

## 2019-06-01 RX ORDER — SENNA PLUS 8.6 MG/1
2 TABLET ORAL AT BEDTIME
Refills: 0 | Status: DISCONTINUED | OUTPATIENT
Start: 2019-06-01 | End: 2019-06-01

## 2019-06-01 RX ADMIN — Medication 81 MILLIGRAM(S): at 11:40

## 2019-06-01 RX ADMIN — LOSARTAN POTASSIUM 100 MILLIGRAM(S): 100 TABLET, FILM COATED ORAL at 06:14

## 2019-06-01 RX ADMIN — APIXABAN 5 MILLIGRAM(S): 2.5 TABLET, FILM COATED ORAL at 18:15

## 2019-06-01 RX ADMIN — Medication 100 MILLIGRAM(S): at 22:44

## 2019-06-01 RX ADMIN — Medication 40 MILLIGRAM(S): at 06:08

## 2019-06-01 RX ADMIN — HEPARIN SODIUM 5000 UNIT(S): 5000 INJECTION INTRAVENOUS; SUBCUTANEOUS at 14:10

## 2019-06-01 RX ADMIN — AMLODIPINE BESYLATE 5 MILLIGRAM(S): 2.5 TABLET ORAL at 11:40

## 2019-06-01 RX ADMIN — HEPARIN SODIUM 5000 UNIT(S): 5000 INJECTION INTRAVENOUS; SUBCUTANEOUS at 06:13

## 2019-06-01 RX ADMIN — Medication 40 MILLIGRAM(S): at 18:16

## 2019-06-01 RX ADMIN — Medication 40 MILLIEQUIVALENT(S): at 18:18

## 2019-06-01 RX ADMIN — Medication 25 MILLIGRAM(S): at 14:14

## 2019-06-01 RX ADMIN — SENNA PLUS 2 TABLET(S): 8.6 TABLET ORAL at 22:45

## 2019-06-01 RX ADMIN — Medication 100 GRAM(S): at 18:28

## 2019-06-01 NOTE — PROGRESS NOTE ADULT - ASSESSMENT
85M with hx CVA, AF (on ASA), CHFrEF, medication noncompliance, presents with SOB, chest pressure, HTN urgency, CHF exacerbation    1.  Acute on chronic systolic CHF  -EF 37% in Aug 2018  -Repeat echo  -Serial troponins  -Daily weights  -Is/Os  -ARB  -start low dose beta blocker   -IV diuresis  -check A1C and statin  -Consider stress test pending clinical course    #chronic AF  -CHADS2 score of 5, high risk for CVA, will need to discuss with cardiology why patient not on full dose A/C  -c/w ASA for now, consider starting Eliquis if AF non-valvular on pending echo    #HTN urgency  -resume home medications, s/p IV labetalol (10 mg) and SL NTG in the ED    IMPROVE VTE Individual Risk Assessment          RISK                                                          Points  [  ] Previous VTE                                                3  [  ] Thrombophilia                                             2  [  ] Lower limb paralysis                                   2        (unable to hold up >15 seconds)    [  ] Current Cancer                                             2         (within 6 months)  [  ] Immobilization > 24 hrs                              1  [  ] ICU/CCU stay > 24 hours                             1  [x  ] Age > 60                                                         1    IMPROVE VTE Score: 1, HSQ ordered      GOC: Patient is FULL CODE    Case d/w daughter, Lora, [phone] 85M with hx CVA, AF (on ASA), CHFrEF, medication noncompliance, presents with SOB, chest pressure, HTN urgency, CHF exacerbation    1.  Acute on chronic systolic CHF  -EF 37% in Aug 2018, will discuss with Cardiology need for repeat Echo  -Serial troponins with mild elevation, ? significance could be due to Afib c RVR, CHF  -Daily weights  -continue ARB,  low dose beta blocker   -IV diuresis  -Consider stress test pending clinical course    2.  chronic AF  -CHADS2 score of 5, high risk for CVA,   -c/w ASA for now, consider starting Eliquis     3.  HTN urgency  -resume Norvasc, Losartan, Metoprolol, s/p IV labetalol (10 mg) and SL NTG in the ED 85M with hx CVA, AF (on ASA), CHFrEF, medication noncompliance, presents with SOB, chest pressure, HTN urgency, CHF exacerbation    1.  Acute on chronic systolic CHF  -EF 37% in Aug 2018, will discuss with Cardiology need for repeat Echo  -Serial troponins with mild elevation, ? significance could be due to Afib c RVR, CHF  -Daily weights  -continue ARB,  low dose beta blocker   -IV diuresis  -Consider stress test pending clinical course    2.  Chronic AF  -CHADS2 score of 5, high risk for CVA,   -c/w ASA for now, consider starting Eliquis     3.  HTN urgency  -resume Norvasc, Losartan, Metoprolol, s/p IV labetalol (10 mg) and SL NTG in the ED    4.  Possible SSS  -pt with tachy/angelica overnite, continue low dose BB, continue to follow on tele moniter, Cardiology f/u 85M with hx CVA, AF (on ASA), CHFrEF, medication noncompliance, presents with SOB, chest pressure, HTN urgency, CHF exacerbation    1.  Acute on chronic systolic CHF  -EF 37% in Aug 2018, repeat Echo - results pending  -Serial troponins with mild elevation, ? significance could be due to Afib c RVR, CHF  -Daily weights  -continue ARB,  low dose beta blocker   -IV diuresis  -Consider stress test pending clinical course    2.  Chronic AF  -CHADS2 score of 5, high risk for CVA,   -Start Eliquis    3.  HTN urgency  -resume Norvasc, Losartan, Metoprolol, s/p IV labetalol (10 mg) and SL NTG in the ED  -much improved    4.  Possible SSS  -pt with tachy/angelica overnite, continue low dose BB, continue to follow on tele moniter, Cardiology f/u, may need PPM    5. Hypokalemia - replete, will also give Mg

## 2019-06-01 NOTE — PROGRESS NOTE ADULT - ATTENDING COMMENTS
I have personally seen and examined patient on the above date.  I discussed the case with ARMAAN Dash and I agree with findings and plan as detailed per note above, which I have amended where appropriate.

## 2019-06-01 NOTE — DIETITIAN INITIAL EVALUATION ADULT. - ENERGY NEEDS
Height: 5'6", Weight: (6/1) 181.4lbs  Skin: no pressure ulcers, Edema: +1 bilateral legs   IBW range: 128-156lbs  Last BM: 5/31 per pt

## 2019-06-01 NOTE — PROGRESS NOTE ADULT - SUBJECTIVE AND OBJECTIVE BOX
Patient is a 85y old  Male who presents with a chief complaint of Chest pain (01 Jun 2019 08:38)      Patient seen and examined at bedside.    ALLERGIES:  No Known Allergies    MEDICATIONS  (STANDING):  amLODIPine   Tablet 5 milliGRAM(s) Oral <User Schedule>  aspirin enteric coated 81 milliGRAM(s) Oral daily  furosemide   Injectable 40 milliGRAM(s) IV Push two times a day  heparin  Injectable 5000 Unit(s) SubCutaneous every 8 hours  losartan 100 milliGRAM(s) Oral daily  metoprolol succinate ER 25 milliGRAM(s) Oral daily    MEDICATIONS  (PRN):  acetaminophen   Tablet .. 650 milliGRAM(s) Oral every 4 hours PRN Mild Pain (1 - 3)    Vital Signs Last 24 Hrs  T(F): 98.4 (01 Jun 2019 10:14), Max: 98.6 (31 May 2019 21:33)  HR: 64 (01 Jun 2019 14:12) (47 - 84)  BP: 156/66 (01 Jun 2019 14:12) (143/66 - 177/99)  RR: 15 (01 Jun 2019 10:14) (15 - 18)  SpO2: 99% (01 Jun 2019 10:14) (97% - 99%)  I&O's Summary    31 May 2019 07:01  -  01 Jun 2019 07:00  --------------------------------------------------------  IN: 180 mL / OUT: 400 mL / NET: -220 mL    01 Jun 2019 07:01  -  01 Jun 2019 14:50  --------------------------------------------------------  IN: 620 mL / OUT: 1200 mL / NET: -580 mL      PHYSICAL EXAM:  General: NAD, A/O x 3  ENT: MMM  Neck: Supple, No JVD  Lungs: Clear to auscultation bilaterally  Cardio: RRR, S1/S2, No murmurs  Abdomen: Soft, Nontender, Nondistended; Bowel sounds present  Extremities: No calf tenderness, No pitting edema    LABS:                        13.3   12.09 )-----------( 179      ( 01 Jun 2019 05:00 )             40.1     06-01    144  |  103  |  14  ----------------------------<  96  3.4   |  28  |  1.17    Ca    8.6      01 Jun 2019 05:00  Phos  4.5     06-01  Mg     1.6     06-01    TPro  7.3  /  Alb  3.4  /  TBili  0.4  /  DBili  x   /  AST  39  /  ALT  24  /  AlkPhos  60  05-31    eGFR if Non African American: 56 mL/min/1.73M2 (06-01-19 @ 05:00)  eGFR if African American: 65 mL/min/1.73M2 (06-01-19 @ 05:00)    PT/INR - ( 31 May 2019 17:15 )   PT: 12.2 sec;   INR: 1.09 ratio         PTT - ( 31 May 2019 17:15 )  PTT:28.2 sec  Lactate, Blood: 1.3 mmol/L (05-31 @ 13:00)    CARDIAC MARKERS ( 01 Jun 2019 13:55 )  .081 ng/mL / x     / x     / x     / x      CARDIAC MARKERS ( 01 Jun 2019 05:00 )  .077 ng/mL / x     / x     / x     / x      CARDIAC MARKERS ( 31 May 2019 20:19 )  .091 ng/mL / x     / x     / x     / x      CARDIAC MARKERS ( 31 May 2019 12:35 )  .106 ng/mL / x     / x     / x     / x          06-01 Chol 171 mg/dL  mg/dL HDL 51 mg/dL Trig 63 mg/dL        CAPILLARY BLOOD GLUCOSE        06-01 [id number] 5.3    RADIOLOGY & ADDITIONAL TESTS:  < from: Xray Chest 1 View AP/PA (05.31.19 @ 12:44) >    IMPRESSION: Mild central congestion and small left base effusion are   increased from prior. Heart enlargement again noted.        < end of copied text >    Care Discussed with Consultants/Other Providers: Patient is a 85y old  Male who presents with a chief complaint of Chest pain (01 Jun 2019 08:38)  Patient seen and examined at bedside.  Pt. feels better.  Pt. with episodes of tachy/angelica overnite.    ALLERGIES:  No Known Allergies    MEDICATIONS  (STANDING):  amLODIPine   Tablet 5 milliGRAM(s) Oral <User Schedule>  aspirin enteric coated 81 milliGRAM(s) Oral daily  furosemide   Injectable 40 milliGRAM(s) IV Push two times a day  heparin  Injectable 5000 Unit(s) SubCutaneous every 8 hours  losartan 100 milliGRAM(s) Oral daily  metoprolol succinate ER 25 milliGRAM(s) Oral daily    MEDICATIONS  (PRN):  acetaminophen   Tablet .. 650 milliGRAM(s) Oral every 4 hours PRN Mild Pain (1 - 3)    Vital Signs Last 24 Hrs  T(F): 98.4 (01 Jun 2019 10:14), Max: 98.6 (31 May 2019 21:33)  HR: 64 (01 Jun 2019 14:12) (47 - 84)  BP: 156/66 (01 Jun 2019 14:12) (143/66 - 177/99)  RR: 15 (01 Jun 2019 10:14) (15 - 18)  SpO2: 99% (01 Jun 2019 10:14) (97% - 99%)  I&O's Summary    31 May 2019 07:01  -  01 Jun 2019 07:00  --------------------------------------------------------  IN: 180 mL / OUT: 400 mL / NET: -220 mL    01 Jun 2019 07:01  -  01 Jun 2019 14:50  --------------------------------------------------------  IN: 620 mL / OUT: 1200 mL / NET: -580 mL      PHYSICAL EXAM:  General: NAD, A/O x 3  ENT: MMM  Neck: Supple, No JVD  Lungs: Clear to auscultation bilaterally, decreased BS at the bases  Cardio: RRR, S1/S2, No murmurs  Abdomen: Soft, Nontender, Nondistended; Bowel sounds present  Extremities: No calf tenderness, No pitting edema    LABS:                        13.3   12.09 )-----------( 179      ( 01 Jun 2019 05:00 )             40.1     06-01    144  |  103  |  14  ----------------------------<  96  3.4   |  28  |  1.17    Ca    8.6      01 Jun 2019 05:00  Phos  4.5     06-01  Mg     1.6     06-01    TPro  7.3  /  Alb  3.4  /  TBili  0.4  /  DBili  x   /  AST  39  /  ALT  24  /  AlkPhos  60  05-31    eGFR if Non African American: 56 mL/min/1.73M2 (06-01-19 @ 05:00)  eGFR if African American: 65 mL/min/1.73M2 (06-01-19 @ 05:00)    PT/INR - ( 31 May 2019 17:15 )   PT: 12.2 sec;   INR: 1.09 ratio         PTT - ( 31 May 2019 17:15 )  PTT:28.2 sec  Lactate, Blood: 1.3 mmol/L (05-31 @ 13:00)    CARDIAC MARKERS ( 01 Jun 2019 13:55 )  .081 ng/mL / x     / x     / x     / x      CARDIAC MARKERS ( 01 Jun 2019 05:00 )  .077 ng/mL / x     / x     / x     / x      CARDIAC MARKERS ( 31 May 2019 20:19 )  .091 ng/mL / x     / x     / x     / x      CARDIAC MARKERS ( 31 May 2019 12:35 )  .106 ng/mL / x     / x     / x     / x          06-01 Chol 171 mg/dL  mg/dL HDL 51 mg/dL Trig 63 mg/dL        CAPILLARY BLOOD GLUCOSE        06-01 [id number] 5.3    RADIOLOGY & ADDITIONAL TESTS:  < from: Xray Chest 1 View AP/PA (05.31.19 @ 12:44) >    IMPRESSION: Mild central congestion and small left base effusion are   increased from prior. Heart enlargement again noted.        < end of copied text >    Care Discussed with Consultants/Other Providers:

## 2019-06-01 NOTE — PROGRESS NOTE ADULT - ASSESSMENT
Improved hypertensive urgency with elevated TN due to this and AF with RVR, underlying CAD  CHF  Improving        Suggest low dose b blocker/telemetery. Might need pacer for sss.  Anticoag, bp control, diuretic. Statin Aspirin

## 2019-06-01 NOTE — PROGRESS NOTE ADULT - SUBJECTIVE AND OBJECTIVE BOX
Follow up for    hypertensive urgency, CHF, AF    SUBJ:   Feeling better, wants to go home    Had bradycardia over night, b blocker dc'd at that time.  No chest pain, dyspnea.    PMH  Hypertension, unspecified type  CVA (cerebral vascular accident)  Congestive heart failure (CHF)  Afib      MEDICATIONS  (STANDING):  amLODIPine   Tablet 5 milliGRAM(s) Oral <User Schedule>  aspirin enteric coated 81 milliGRAM(s) Oral daily  furosemide   Injectable 40 milliGRAM(s) IV Push two times a day  heparin  Injectable 5000 Unit(s) SubCutaneous every 8 hours  losartan 100 milliGRAM(s) Oral daily  metoprolol succinate ER 25 milliGRAM(s) Oral daily    MEDICATIONS  (PRN):  acetaminophen   Tablet .. 650 milliGRAM(s) Oral every 4 hours PRN Mild Pain (1 - 3)        PHYSICAL EXAM:  Vital Signs Last 24 Hrs  T(C): 36.9 (01 Jun 2019 05:48), Max: 37.6 (31 May 2019 12:36)  T(F): 98.4 (01 Jun 2019 05:48), Max: 99.6 (31 May 2019 12:36)  HR: 47 (01 Jun 2019 05:48) (47 - 99)  BP: 176/50 (01 Jun 2019 05:48) (143/66 - 228/106)  BP(mean): --  RR: 17 (01 Jun 2019 05:48) (16 - 21)  SpO2: 97% (01 Jun 2019 05:48) (94% - 100%)    GENERAL: NAD, well-groomed, well-developed  HEAD:  Atraumatic, Normocephalic  EYES: EOMI, PERRLA, conjunctiva and sclera clear  ENT: Moist mucous membranes,  NECK: Supple, No JVD, no bruits  CHEST/LUNG: Clear to percussion and auscultation bilaterally; No rales, rhonchi, wheezing, or rubs  HEART: Regular rate and rhythm; No murmurs, rubs, or gallops PMI non displaced.  ABDOMEN: Soft, Nontender, Nondistended; Bowel sounds present  EXTREMITIES:   No clubbing, cyanosis, or edema  SKIN: No rashes or lesions  NERVOUS SYSTEM:  Alert      TELEMETRY:    af, angelica to 30s overnight, burtsts of tachycardia    ECG:    LABS:                        13.3   12.09 )-----------( 179      ( 01 Jun 2019 05:00 )             40.1     06-01    144  |  103  |  14  ----------------------------<  96  3.4<L>   |  28  |  1.17    Ca    8.6      01 Jun 2019 05:00  Phos  4.5     06-01  Mg     1.6     06-01    TPro  7.3  /  Alb  3.4  /  TBili  0.4  /  DBili  x   /  AST  39  /  ALT  24  /  AlkPhos  60  05-31    CARDIAC MARKERS ( 01 Jun 2019 05:00 )  .077 ng/mL / x     / x     / x     / x      CARDIAC MARKERS ( 31 May 2019 20:19 )  .091 ng/mL / x     / x     / x     / x      CARDIAC MARKERS ( 31 May 2019 12:35 )  .106 ng/mL / x     / x     / x     / x          PT/INR - ( 31 May 2019 17:15 )   PT: 12.2 sec;   INR: 1.09 ratio         PTT - ( 31 May 2019 17:15 )  PTT:28.2 sec    I&O's Summary    31 May 2019 07:01  -  01 Jun 2019 07:00  --------------------------------------------------------  IN: 180 mL / OUT: 400 mL / NET: -220 mL      BNPSerum Pro-Brain Natriuretic Peptide: 3005 pg/mL (05-31 @ 12:35)      RADIOLOGY & ADDITIONAL STUDIES:    ECHO:

## 2019-06-01 NOTE — CHART NOTE - NSCHARTNOTEFT_GEN_A_CORE
-patient admitted with NSTEMI, currently no complaints, no chest pain  -bedside RN informed that patient has not had a bowel movement since admission (5/31/19, roughly 1 day)  -will give 1 time dose of senna and colace tonight and assess if aptient has BM   -further doses to be given per patient's primary team -patient admitted with NSTEMI, currently no complaints, no chest pain  -bedside RN informed that patient has not had a bowel movement since admission and does endorse feeling constipated (5/31/19, roughly 1 day)  -will give 1 time dose of senna and colace tonight and assess if aptient has BM   -further doses to be given per patient's primary team  -NSTEMI care per primary team

## 2019-06-01 NOTE — PHYSICAL THERAPY INITIAL EVALUATION ADULT - ADDITIONAL COMMENTS
Pt lives in house with family, 1 ANDIE, no steps inside.  Pt ambulates with cane and is independent with ADLs.  Owns RW

## 2019-06-01 NOTE — DIETITIAN INITIAL EVALUATION ADULT. - ADHERENCE
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 Pt states that he loves to eat everything./poor

## 2019-06-01 NOTE — DIETITIAN INITIAL EVALUATION ADULT. - OTHER INFO
85M with hx CVA, AF (on ASA), CHFrEF, medication noncompliance, presents with SOB, chest pressure, HTN urgency, CHF exacerbation. Pt tolerating DASH/TLC diet with normal appetite/po intake: noted 80% per chart review. Pt denies any recent weight or appetite changes. Reports UBW 170lbs which indicates weight gain compared to current weight (181lbs). Reviewed heart failure nutrition therapy with pt and provided booklet. Pt report good understanding. No GI distress- reports last BM 5/31.

## 2019-06-02 LAB
ANION GAP SERPL CALC-SCNC: 11 MMOL/L — SIGNIFICANT CHANGE UP (ref 5–17)
BUN SERPL-MCNC: 24 MG/DL — HIGH (ref 7–23)
CALCIUM SERPL-MCNC: 9 MG/DL — SIGNIFICANT CHANGE UP (ref 8.4–10.5)
CHLORIDE SERPL-SCNC: 101 MMOL/L — SIGNIFICANT CHANGE UP (ref 96–108)
CO2 SERPL-SCNC: 29 MMOL/L — SIGNIFICANT CHANGE UP (ref 22–31)
CREAT SERPL-MCNC: 1.18 MG/DL — SIGNIFICANT CHANGE UP (ref 0.5–1.3)
GLUCOSE SERPL-MCNC: 154 MG/DL — HIGH (ref 70–99)
HCT VFR BLD CALC: 39.9 % — SIGNIFICANT CHANGE UP (ref 39–50)
HGB BLD-MCNC: 13.5 G/DL — SIGNIFICANT CHANGE UP (ref 13–17)
MCHC RBC-ENTMCNC: 32.2 PG — SIGNIFICANT CHANGE UP (ref 27–34)
MCHC RBC-ENTMCNC: 33.8 GM/DL — SIGNIFICANT CHANGE UP (ref 32–36)
MCV RBC AUTO: 95.2 FL — SIGNIFICANT CHANGE UP (ref 80–100)
NRBC # BLD: 0 /100 WBCS — SIGNIFICANT CHANGE UP (ref 0–0)
PLATELET # BLD AUTO: 165 K/UL — SIGNIFICANT CHANGE UP (ref 150–400)
POTASSIUM SERPL-MCNC: 3.3 MMOL/L — LOW (ref 3.5–5.3)
POTASSIUM SERPL-SCNC: 3.3 MMOL/L — LOW (ref 3.5–5.3)
RBC # BLD: 4.19 M/UL — LOW (ref 4.2–5.8)
RBC # FLD: 14 % — SIGNIFICANT CHANGE UP (ref 10.3–14.5)
SODIUM SERPL-SCNC: 141 MMOL/L — SIGNIFICANT CHANGE UP (ref 135–145)
WBC # BLD: 13.27 K/UL — HIGH (ref 3.8–10.5)
WBC # FLD AUTO: 13.27 K/UL — HIGH (ref 3.8–10.5)

## 2019-06-02 PROCEDURE — 99233 SBSQ HOSP IP/OBS HIGH 50: CPT | Mod: GC

## 2019-06-02 PROCEDURE — 99232 SBSQ HOSP IP/OBS MODERATE 35: CPT

## 2019-06-02 RX ORDER — POTASSIUM CHLORIDE 20 MEQ
20 PACKET (EA) ORAL DAILY
Refills: 0 | Status: DISCONTINUED | OUTPATIENT
Start: 2019-06-02 | End: 2019-06-03

## 2019-06-02 RX ORDER — NYSTATIN 500MM UNIT
5 POWDER (EA) MISCELLANEOUS
Refills: 0 | Status: DISCONTINUED | OUTPATIENT
Start: 2019-06-02 | End: 2019-06-03

## 2019-06-02 RX ADMIN — AMLODIPINE BESYLATE 5 MILLIGRAM(S): 2.5 TABLET ORAL at 11:47

## 2019-06-02 RX ADMIN — Medication 5 MILLILITER(S): at 17:33

## 2019-06-02 RX ADMIN — Medication 81 MILLIGRAM(S): at 10:45

## 2019-06-02 RX ADMIN — Medication 20 MILLIEQUIVALENT(S): at 11:47

## 2019-06-02 RX ADMIN — Medication 5 MILLILITER(S): at 11:47

## 2019-06-02 RX ADMIN — Medication 40 MILLIGRAM(S): at 05:09

## 2019-06-02 RX ADMIN — Medication 40 MILLIGRAM(S): at 17:33

## 2019-06-02 RX ADMIN — Medication 25 MILLIGRAM(S): at 05:09

## 2019-06-02 RX ADMIN — Medication 5 MILLIGRAM(S): at 10:46

## 2019-06-02 RX ADMIN — APIXABAN 5 MILLIGRAM(S): 2.5 TABLET, FILM COATED ORAL at 17:33

## 2019-06-02 RX ADMIN — APIXABAN 5 MILLIGRAM(S): 2.5 TABLET, FILM COATED ORAL at 05:09

## 2019-06-02 RX ADMIN — LOSARTAN POTASSIUM 100 MILLIGRAM(S): 100 TABLET, FILM COATED ORAL at 05:09

## 2019-06-02 NOTE — PROGRESS NOTE ADULT - SUBJECTIVE AND OBJECTIVE BOX
Patient is a 85y old  Male who presents with a chief complaint of Chest pain (01 Jun 2019 14:50)  Patient seen and examined at bedside.  Pt. c/o constipation x 3 days, abd distention.    ALLERGIES:  No Known Allergies    MEDICATIONS  (STANDING):  amLODIPine   Tablet 5 milliGRAM(s) Oral <User Schedule>  apixaban 5 milliGRAM(s) Oral every 12 hours  aspirin enteric coated 81 milliGRAM(s) Oral daily  furosemide   Injectable 40 milliGRAM(s) IV Push two times a day  losartan 100 milliGRAM(s) Oral daily  metoprolol succinate ER 25 milliGRAM(s) Oral daily    MEDICATIONS  (PRN):  acetaminophen   Tablet .. 650 milliGRAM(s) Oral every 4 hours PRN Mild Pain (1 - 3)  bisacodyl 5 milliGRAM(s) Oral every 12 hours PRN Constipation    Vital Signs Last 24 Hrs  T(F): 98 (02 Jun 2019 08:20), Max: 98.4 (01 Jun 2019 10:14)  HR: 59 (02 Jun 2019 08:20) (59 - 83)  BP: 120/60 (02 Jun 2019 08:20) (120/60 - 157/73)  RR: 15 (02 Jun 2019 08:20) (15 - 15)  SpO2: 99% (02 Jun 2019 08:20) (95% - 100%)  I&O's Summary    01 Jun 2019 07:01  -  02 Jun 2019 07:00  --------------------------------------------------------  IN: 820 mL / OUT: 2850 mL / NET: -2030 mL      PHYSICAL EXAM:  General: NAD, A/O x 3  ENT: MMM  Neck: Supple, No JVD  Lungs: decreased breath sounds b/l left >right, +crackles  Cardio: RRR, S1/S2, +systolic murmur  Abdomen: Soft, +distention, +BS  Extremities: No calf tenderness, 1+ edema at lower exts  Neuro:  nonfocal    LABS:                        13.5   13.27 )-----------( 165      ( 02 Jun 2019 06:24 )             39.9     06-02    141  |  101  |  24  ----------------------------<  154  3.3   |  29  |  1.18    Ca    9.0      02 Jun 2019 06:24  Phos  4.5     06-01  Mg     1.6     06-01    TPro  7.3  /  Alb  3.4  /  TBili  0.4  /  DBili  x   /  AST  39  /  ALT  24  /  AlkPhos  60  05-31    eGFR if Non African American: 56 mL/min/1.73M2 (06-02-19 @ 06:24)  eGFR if : 65 mL/min/1.73M2 (06-02-19 @ 06:24)    PT/INR - ( 31 May 2019 17:15 )   PT: 12.2 sec;   INR: 1.09 ratio         PTT - ( 31 May 2019 17:15 )  PTT:28.2 sec  Lactate, Blood: 1.3 mmol/L (05-31 @ 13:00)    CARDIAC MARKERS ( 01 Jun 2019 13:55 )  .081 ng/mL / x     / x     / x     / x      CARDIAC MARKERS ( 01 Jun 2019 05:00 )  .077 ng/mL / x     / x     / x     / x      CARDIAC MARKERS ( 31 May 2019 20:19 )  .091 ng/mL / x     / x     / x     / x      CARDIAC MARKERS ( 31 May 2019 12:35 )  .106 ng/mL / x     / x     / x     / x          06-01 Chol 171 mg/dL  mg/dL HDL 51 mg/dL Trig 63 mg/dL    RADIOLOGY & ADDITIONAL TESTS:  < from: TTE Echo Complete w/Doppler (06.01.19 @ 13:09) >      Summary:   1. Left ventricular ejection fraction, by visual estimation, is 38%.   2. Moderately decreased global left ventricular systolic function.   3. Spectral Doppler shows restrictive pattern of left ventricular   myocardial filling (Grade III diastolic dysfunction).   4. Moderate mitral stenosis.   5. Moderate thickening and calcification of the anterior and posterior   mitral valve leaflets.   6. Mild aortic regurgitation.   7. Estimated pulmonary artery systolic pressure is 38.3 mmHg assuming a   right atrial pressure of 10 mmHg, which is consistent with borderline   pulmonary hypertension.   8. Pulmonary hypertension is present.   9. LA volume Index is 44.5 ml/m² ml/m2.    < end of copied text >    Care Discussed with Consultants/Other Providers: Patient is a 85y old  Male who presents with a chief complaint of Chest pain (01 Jun 2019 14:50)  Patient seen and examined at bedside.  Pt. c/o constipation x 3 days, abd distention.    ALLERGIES:  No Known Allergies    MEDICATIONS  (STANDING):  amLODIPine   Tablet 5 milliGRAM(s) Oral <User Schedule>  apixaban 5 milliGRAM(s) Oral every 12 hours  aspirin enteric coated 81 milliGRAM(s) Oral daily  furosemide   Injectable 40 milliGRAM(s) IV Push two times a day  losartan 100 milliGRAM(s) Oral daily  metoprolol succinate ER 25 milliGRAM(s) Oral daily    MEDICATIONS  (PRN):  acetaminophen   Tablet .. 650 milliGRAM(s) Oral every 4 hours PRN Mild Pain (1 - 3)  bisacodyl 5 milliGRAM(s) Oral every 12 hours PRN Constipation    Vital Signs Last 24 Hrs  T(F): 98 (02 Jun 2019 08:20), Max: 98.4 (01 Jun 2019 10:14)  HR: 59 (02 Jun 2019 08:20) (59 - 83)  BP: 120/60 (02 Jun 2019 08:20) (120/60 - 157/73)  RR: 15 (02 Jun 2019 08:20) (15 - 15)  SpO2: 99% (02 Jun 2019 08:20) (95% - 100%)  I&O's Summary    01 Jun 2019 07:01  -  02 Jun 2019 07:00  --------------------------------------------------------  IN: 820 mL / OUT: 2850 mL / NET: -2030 mL      PHYSICAL EXAM:  General: NAD, A/O x 3  HEENT:  tongue with +thrush  Neck: Supple, No JVD  Lungs: decreased breath sounds b/l left >right, +crackles  Cardio: RRR, S1/S2, +systolic murmur  Abdomen: Soft, +distention, +BS  Extremities: No calf tenderness, 1+ edema at lower exts  Neuro:  nonfocal    LABS:                        13.5   13.27 )-----------( 165      ( 02 Jun 2019 06:24 )             39.9     06-02    141  |  101  |  24  ----------------------------<  154  3.3   |  29  |  1.18    Ca    9.0      02 Jun 2019 06:24  Phos  4.5     06-01  Mg     1.6     06-01    TPro  7.3  /  Alb  3.4  /  TBili  0.4  /  DBili  x   /  AST  39  /  ALT  24  /  AlkPhos  60  05-31    eGFR if Non African American: 56 mL/min/1.73M2 (06-02-19 @ 06:24)  eGFR if : 65 mL/min/1.73M2 (06-02-19 @ 06:24)    PT/INR - ( 31 May 2019 17:15 )   PT: 12.2 sec;   INR: 1.09 ratio         PTT - ( 31 May 2019 17:15 )  PTT:28.2 sec  Lactate, Blood: 1.3 mmol/L (05-31 @ 13:00)    CARDIAC MARKERS ( 01 Jun 2019 13:55 )  .081 ng/mL / x     / x     / x     / x      CARDIAC MARKERS ( 01 Jun 2019 05:00 )  .077 ng/mL / x     / x     / x     / x      CARDIAC MARKERS ( 31 May 2019 20:19 )  .091 ng/mL / x     / x     / x     / x      CARDIAC MARKERS ( 31 May 2019 12:35 )  .106 ng/mL / x     / x     / x     / x          06-01 Chol 171 mg/dL  mg/dL HDL 51 mg/dL Trig 63 mg/dL    RADIOLOGY & ADDITIONAL TESTS:  < from: TTE Echo Complete w/Doppler (06.01.19 @ 13:09) >      Summary:   1. Left ventricular ejection fraction, by visual estimation, is 38%.   2. Moderately decreased global left ventricular systolic function.   3. Spectral Doppler shows restrictive pattern of left ventricular   myocardial filling (Grade III diastolic dysfunction).   4. Moderate mitral stenosis.   5. Moderate thickening and calcification of the anterior and posterior   mitral valve leaflets.   6. Mild aortic regurgitation.   7. Estimated pulmonary artery systolic pressure is 38.3 mmHg assuming a   right atrial pressure of 10 mmHg, which is consistent with borderline   pulmonary hypertension.   8. Pulmonary hypertension is present.   9. LA volume Index is 44.5 ml/m² ml/m2.    < end of copied text >    Care Discussed with Consultants/Other Providers:

## 2019-06-02 NOTE — PROGRESS NOTE ADULT - ATTENDING COMMENTS
I have personally seen and examined patient on the above date.  I discussed the case with ARMAAN Dash and I agree with findings and plan as detailed per note above, which I have amended where appropriate. I have personally seen and examined patient on the above date.  I discussed the case with ARMAAN Dash and I agree with findings and plan as detailed per note above, which I have amended where appropriate.    #Oral candidiasis: Start nystatin

## 2019-06-02 NOTE — PROGRESS NOTE ADULT - ASSESSMENT
85M with hx CVA, AF (on ASA), CHFrEF, medication noncompliance, presents with SOB, chest pressure, HTN urgency, CHF exacerbation    1.  Acute on chronic systolic and diastolic CHF  -EF 37% in Aug 2018, repeat Echo - results pending  -Serial troponins with mild elevation, ? significance could be due to Afib c RVR, CHF  -Daily weights  -continue ARB,  low dose beta blocker   -IV diuresis  -Consider stress test pending clinical course    2.  Chronic AF  -CHADS2 score of 5, high risk for CVA,   -Start Eliquis    3.  HTN urgency  -resume Norvasc, Losartan, Metoprolol, s/p IV labetalol (10 mg) and SL NTG in the ED  -much improved    4.  Possible SSS  -pt with tachy/angelica overnite, continue low dose BB, continue to follow on tele moniter, Cardiology f/u, may need PPM    5. Hypokalemia - replete, will also give Mg 85M with hx CVA, AF (on ASA), CHFrEF, medication noncompliance, presents with SOB, chest pressure, HTN urgency, CHF exacerbation    1.  Acute on chronic systolic and diastolic CHF  -EF 37% in Aug 2018, repeat Echo - EF 38%, grade 3 diastolic dysfunction, mod mitral stenosis  -Serial troponins with mild elevation, ? significance could be due to Afib c RVR, CHF exas  -Daily weights  -continue ARB,  low dose beta blocker, IV diuresis  -Consider stress test; await Cardiology input today    2.  Chronic AF  -CHADS2 score of 5, high risk for CVA, Eliquis started yesterday for AC    3.  HTN urgency  -resume Norvasc, Losartan, Metoprolol, s/p IV labetalol (10 mg) and SL NTG in the ED  -much improved    4.  Possible SSS  -pt with tachy/angelica episode overnight on 6/1; continue low dose BB, continue to follow on tele moniter, Cardiology f/u, may need PPM    5. Hypokalemia - replete, will also give Mg  -follow bmp daily

## 2019-06-03 ENCOUNTER — TRANSCRIPTION ENCOUNTER (OUTPATIENT)
Age: 84
End: 2019-06-03

## 2019-06-03 VITALS
OXYGEN SATURATION: 100 % | DIASTOLIC BLOOD PRESSURE: 89 MMHG | HEART RATE: 93 BPM | TEMPERATURE: 98 F | RESPIRATION RATE: 14 BRPM | SYSTOLIC BLOOD PRESSURE: 144 MMHG

## 2019-06-03 LAB
ANION GAP SERPL CALC-SCNC: 10 MMOL/L — SIGNIFICANT CHANGE UP (ref 5–17)
BUN SERPL-MCNC: 28 MG/DL — HIGH (ref 7–23)
CALCIUM SERPL-MCNC: 9 MG/DL — SIGNIFICANT CHANGE UP (ref 8.4–10.5)
CHLORIDE SERPL-SCNC: 102 MMOL/L — SIGNIFICANT CHANGE UP (ref 96–108)
CO2 SERPL-SCNC: 30 MMOL/L — SIGNIFICANT CHANGE UP (ref 22–31)
CREAT SERPL-MCNC: 1.13 MG/DL — SIGNIFICANT CHANGE UP (ref 0.5–1.3)
GLUCOSE SERPL-MCNC: 108 MG/DL — HIGH (ref 70–99)
HCT VFR BLD CALC: 40.8 % — SIGNIFICANT CHANGE UP (ref 39–50)
HGB BLD-MCNC: 13.4 G/DL — SIGNIFICANT CHANGE UP (ref 13–17)
MAGNESIUM SERPL-MCNC: 2.1 MG/DL — SIGNIFICANT CHANGE UP (ref 1.6–2.6)
MCHC RBC-ENTMCNC: 31.5 PG — SIGNIFICANT CHANGE UP (ref 27–34)
MCHC RBC-ENTMCNC: 32.8 GM/DL — SIGNIFICANT CHANGE UP (ref 32–36)
MCV RBC AUTO: 96 FL — SIGNIFICANT CHANGE UP (ref 80–100)
NRBC # BLD: 0 /100 WBCS — SIGNIFICANT CHANGE UP (ref 0–0)
PLATELET # BLD AUTO: 155 K/UL — SIGNIFICANT CHANGE UP (ref 150–400)
POTASSIUM SERPL-MCNC: 3.6 MMOL/L — SIGNIFICANT CHANGE UP (ref 3.5–5.3)
POTASSIUM SERPL-SCNC: 3.6 MMOL/L — SIGNIFICANT CHANGE UP (ref 3.5–5.3)
RBC # BLD: 4.25 M/UL — SIGNIFICANT CHANGE UP (ref 4.2–5.8)
RBC # FLD: 13.5 % — SIGNIFICANT CHANGE UP (ref 10.3–14.5)
SODIUM SERPL-SCNC: 142 MMOL/L — SIGNIFICANT CHANGE UP (ref 135–145)
WBC # BLD: 12.31 K/UL — HIGH (ref 3.8–10.5)
WBC # FLD AUTO: 12.31 K/UL — HIGH (ref 3.8–10.5)

## 2019-06-03 PROCEDURE — 80048 BASIC METABOLIC PNL TOTAL CA: CPT

## 2019-06-03 PROCEDURE — 93005 ELECTROCARDIOGRAM TRACING: CPT

## 2019-06-03 PROCEDURE — 80061 LIPID PANEL: CPT

## 2019-06-03 PROCEDURE — 97161 PT EVAL LOW COMPLEX 20 MIN: CPT

## 2019-06-03 PROCEDURE — 84484 ASSAY OF TROPONIN QUANT: CPT

## 2019-06-03 PROCEDURE — 80053 COMPREHEN METABOLIC PANEL: CPT

## 2019-06-03 PROCEDURE — 83036 HEMOGLOBIN GLYCOSYLATED A1C: CPT

## 2019-06-03 PROCEDURE — 83605 ASSAY OF LACTIC ACID: CPT

## 2019-06-03 PROCEDURE — 85730 THROMBOPLASTIN TIME PARTIAL: CPT

## 2019-06-03 PROCEDURE — 96374 THER/PROPH/DIAG INJ IV PUSH: CPT

## 2019-06-03 PROCEDURE — 71045 X-RAY EXAM CHEST 1 VIEW: CPT

## 2019-06-03 PROCEDURE — 36415 COLL VENOUS BLD VENIPUNCTURE: CPT

## 2019-06-03 PROCEDURE — 84100 ASSAY OF PHOSPHORUS: CPT

## 2019-06-03 PROCEDURE — 87040 BLOOD CULTURE FOR BACTERIA: CPT

## 2019-06-03 PROCEDURE — 99239 HOSP IP/OBS DSCHRG MGMT >30: CPT

## 2019-06-03 PROCEDURE — 99291 CRITICAL CARE FIRST HOUR: CPT | Mod: 25

## 2019-06-03 PROCEDURE — 93306 TTE W/DOPPLER COMPLETE: CPT

## 2019-06-03 PROCEDURE — 83880 ASSAY OF NATRIURETIC PEPTIDE: CPT

## 2019-06-03 PROCEDURE — 83735 ASSAY OF MAGNESIUM: CPT

## 2019-06-03 PROCEDURE — 84145 PROCALCITONIN (PCT): CPT

## 2019-06-03 PROCEDURE — 85610 PROTHROMBIN TIME: CPT

## 2019-06-03 PROCEDURE — 85027 COMPLETE CBC AUTOMATED: CPT

## 2019-06-03 RX ORDER — AMLODIPINE BESYLATE 2.5 MG/1
1 TABLET ORAL
Qty: 30 | Refills: 0
Start: 2019-06-03 | End: 2019-07-02

## 2019-06-03 RX ORDER — FUROSEMIDE 40 MG
1 TABLET ORAL
Qty: 0 | Refills: 0 | DISCHARGE

## 2019-06-03 RX ORDER — IRBESARTAN 75 MG/1
1 TABLET ORAL
Qty: 0 | Refills: 0 | DISCHARGE

## 2019-06-03 RX ORDER — POTASSIUM CHLORIDE 20 MEQ
1 PACKET (EA) ORAL
Qty: 30 | Refills: 0
Start: 2019-06-03 | End: 2019-07-02

## 2019-06-03 RX ORDER — METOPROLOL TARTRATE 50 MG
25 TABLET ORAL ONCE
Refills: 0 | Status: COMPLETED | OUTPATIENT
Start: 2019-06-03 | End: 2019-06-03

## 2019-06-03 RX ORDER — IRBESARTAN 75 MG/1
1 TABLET ORAL
Qty: 30 | Refills: 0
Start: 2019-06-03 | End: 2019-07-02

## 2019-06-03 RX ORDER — AMLODIPINE BESYLATE 2.5 MG/1
1 TABLET ORAL
Qty: 0 | Refills: 0 | DISCHARGE

## 2019-06-03 RX ORDER — NYSTATIN 500MM UNIT
5 POWDER (EA) MISCELLANEOUS
Qty: 280 | Refills: 0
Start: 2019-06-03 | End: 2019-06-16

## 2019-06-03 RX ORDER — FUROSEMIDE 40 MG
1 TABLET ORAL
Qty: 60 | Refills: 0
Start: 2019-06-03 | End: 2019-07-02

## 2019-06-03 RX ORDER — APIXABAN 2.5 MG/1
1 TABLET, FILM COATED ORAL
Qty: 60 | Refills: 0
Start: 2019-06-03 | End: 2019-07-02

## 2019-06-03 RX ORDER — METOPROLOL TARTRATE 50 MG
1 TABLET ORAL
Qty: 30 | Refills: 0
Start: 2019-06-03 | End: 2019-07-02

## 2019-06-03 RX ADMIN — Medication 650 MILLIGRAM(S): at 11:16

## 2019-06-03 RX ADMIN — Medication 5 MILLILITER(S): at 00:26

## 2019-06-03 RX ADMIN — LOSARTAN POTASSIUM 100 MILLIGRAM(S): 100 TABLET, FILM COATED ORAL at 05:34

## 2019-06-03 RX ADMIN — Medication 5 MILLILITER(S): at 05:35

## 2019-06-03 RX ADMIN — Medication 5 MILLILITER(S): at 11:22

## 2019-06-03 RX ADMIN — Medication 81 MILLIGRAM(S): at 11:16

## 2019-06-03 RX ADMIN — AMLODIPINE BESYLATE 5 MILLIGRAM(S): 2.5 TABLET ORAL at 11:16

## 2019-06-03 RX ADMIN — Medication 650 MILLIGRAM(S): at 07:08

## 2019-06-03 RX ADMIN — Medication 25 MILLIGRAM(S): at 11:54

## 2019-06-03 RX ADMIN — Medication 40 MILLIGRAM(S): at 05:35

## 2019-06-03 RX ADMIN — Medication 650 MILLIGRAM(S): at 06:49

## 2019-06-03 RX ADMIN — APIXABAN 5 MILLIGRAM(S): 2.5 TABLET, FILM COATED ORAL at 05:34

## 2019-06-03 RX ADMIN — Medication 20 MILLIEQUIVALENT(S): at 11:16

## 2019-06-03 NOTE — DISCHARGE NOTE PROVIDER - CARE PROVIDER_API CALL
Dewayne Rubio)  Cardiovascular Disease; Internal Medicine  [address] [place] [address]  Phone: [phone]  Fax: [phone]  Follow Up Time:

## 2019-06-03 NOTE — PROGRESS NOTE ADULT - SUBJECTIVE AND OBJECTIVE BOX
Patient is a 85y old  Male who presents with a chief complaint of Chest pain (02 Jun 2019 09:37)      Patient seen and examined at bedside.    ALLERGIES:  No Known Allergies    MEDICATIONS  (STANDING):  amLODIPine   Tablet 5 milliGRAM(s) Oral <User Schedule>  apixaban 5 milliGRAM(s) Oral every 12 hours  aspirin enteric coated 81 milliGRAM(s) Oral daily  furosemide   Injectable 40 milliGRAM(s) IV Push two times a day  losartan 100 milliGRAM(s) Oral daily  metoprolol succinate ER 25 milliGRAM(s) Oral daily  nystatin    Suspension 5 milliLiter(s) Oral four times a day  potassium chloride    Tablet ER 20 milliEquivalent(s) Oral daily    MEDICATIONS  (PRN):  acetaminophen   Tablet .. 650 milliGRAM(s) Oral every 4 hours PRN Mild Pain (1 - 3)  bisacodyl 5 milliGRAM(s) Oral every 12 hours PRN Constipation    Vital Signs Last 24 Hrs  T(F): 97.8 (03 Jun 2019 05:43), Max: 98 (02 Jun 2019 20:24)  HR: 54 (03 Jun 2019 05:43) (54 - 73)  BP: 135/54 (03 Jun 2019 05:43) (133/67 - 149/62)  RR: 15 (03 Jun 2019 05:43) (15 - 16)  SpO2: 100% (03 Jun 2019 05:43) (96% - 100%)  I&O's Summary    02 Jun 2019 07:01  -  03 Jun 2019 07:00  --------------------------------------------------------  IN: 0 mL / OUT: 200 mL / NET: -200 mL      PHYSICAL EXAM:  General: NAD, A/O x 3  HEENT:  MMM, +thrush  Neck: Supple, No JVD  Lungs: bibasilar crackles, fair air entry  Cardio: RRR, S1/S2, +systolic murmur  Abdomen: Soft, mildly distended, +BS  Extremities: No calf tenderness, 1+ edema at lower extremities     LABS:                        13.4   12.31 )-----------( 155      ( 03 Jun 2019 06:27 )             40.8     06-03    142  |  102  |  28  ----------------------------<  108  3.6   |  30  |  1.13    Ca    9.0      03 Jun 2019 06:27  Phos  4.5     06-01  Mg     2.1     06-03    TPro  7.3  /  Alb  3.4  /  TBili  0.4  /  DBili  x   /  AST  39  /  ALT  24  /  AlkPhos  60  05-31    eGFR if Non African American: 59 mL/min/1.73M2 (06-03-19 @ 06:27)  eGFR if : 68 mL/min/1.73M2 (06-03-19 @ 06:27)    PT/INR - ( 31 May 2019 17:15 )   PT: 12.2 sec;   INR: 1.09 ratio         PTT - ( 31 May 2019 17:15 )  PTT:28.2 sec  Lactate, Blood: 1.3 mmol/L (05-31 @ 13:00)    CARDIAC MARKERS ( 01 Jun 2019 13:55 )  .081 ng/mL / x     / x     / x     / x      CARDIAC MARKERS ( 01 Jun 2019 05:00 )  .077 ng/mL / x     / x     / x     / x      CARDIAC MARKERS ( 31 May 2019 20:19 )  .091 ng/mL / x     / x     / x     / x      CARDIAC MARKERS ( 31 May 2019 12:35 )  .106 ng/mL / x     / x     / x     / x          06-01 Chol 171 mg/dL  mg/dL HDL 51 mg/dL Trig 63 mg/dL        CAPILLARY BLOOD GLUCOSE        06-01 [id number] 5.3        Culture - Blood (collected 31 May 2019 13:00)  Source: .Blood Blood-Peripheral  Preliminary Report (01 Jun 2019 20:00):    No growth to date.    Culture - Blood (collected 31 May 2019 13:00)  Source: .Blood Blood-Peripheral  Preliminary Report (01 Jun 2019 20:00):    No growth to date.

## 2019-06-03 NOTE — DISCHARGE NOTE PROVIDER - NSDCFUADDINST_GEN_ALL_CORE_FT
All activity as tolerated    You have refused home care / home PT. If you change your mind, please contact your PMD.    Follow up with your PMD in 1 week    Monitor for weight gain. If you gain more than two pounds, please call your PMD - you may need more Lasix.

## 2019-06-03 NOTE — PROGRESS NOTE ADULT - ASSESSMENT
85M with hx CVA, AF (on ASA), CHFrEF, medication noncompliance, presents with SOB, chest pressure, HTN urgency, CHF exacerbation    1.  Acute on chronic systolic and diastolic CHF  -EF 37% in Aug 2018, repeat Echo - EF 38%, grade 3 diastolic dysfunction, mod mitral stenosis  -Serial troponins with mild elevation of unclear significance - could be due to Afib c RVR, CHF exac  -Daily weights - noted ~ 10 lb weight loss since admission  -continue ARB,  low dose beta blocker, - change to PO Lasix tomorrow    2.  Chronic AF  -CHADS2 score of 5, high risk for CVA, Eliquis started for AC    3.  HTN urgency  -resume Norvasc, Losartan, Metoprolol, s/p IV labetalol (10 mg) and SL NTG in the ED  -much improved    4.  Possible SSS  -consider outpatient Holter monitor. No further evidence on tele.    5. Hypokalemia - repleted    Medication compliance reinforced upon discharge. Patient not interested to see if needs home oxygen. Will await cardiology recs to see if okay to d/c patient. Time spent on d/c 33 min.

## 2019-06-03 NOTE — DISCHARGE NOTE PROVIDER - HOSPITAL COURSE
Patient admitted for SOB, dx with combined systolic / diastolic CHF exacerbation, improved with IV lasix. Patient is non-compliant with medications at home.    He was also started on Eliquis for A-fib.         *Please refer to progress note from same date for an extended detailed summary of all of the patient's medical diagnoses during the course of this hospital stay.

## 2019-06-03 NOTE — DISCHARGE NOTE NURSING/CASE MANAGEMENT/SOCIAL WORK - NSDCDPATPORTLINK_GEN_ALL_CORE
You can access the [facility], offered by [facility], by registering with the following website: [email or web address]

## 2019-06-03 NOTE — DISCHARGE NOTE PROVIDER - NSDCCPCAREPLAN_GEN_ALL_CORE_FT
PRINCIPAL DISCHARGE DIAGNOSIS  Diagnosis: Acute congestive heart failure, unspecified heart failure type  Assessment and Plan of Treatment: Daily weights - monitor for weight gain  take all medications      SECONDARY DISCHARGE DIAGNOSES  Diagnosis: Hypertensive emergency  Assessment and Plan of Treatment:     Diagnosis: Acute congestive heart failure, unspecified heart failure type  Assessment and Plan of Treatment:

## 2019-06-05 LAB
CULTURE RESULTS: SIGNIFICANT CHANGE UP
CULTURE RESULTS: SIGNIFICANT CHANGE UP
SPECIMEN SOURCE: SIGNIFICANT CHANGE UP
SPECIMEN SOURCE: SIGNIFICANT CHANGE UP

## 2019-09-30 ENCOUNTER — INPATIENT (INPATIENT)
Facility: HOSPITAL | Age: 84
LOS: 2 days | Discharge: HOME | DRG: 291 | End: 2019-10-03
Attending: HOSPITALIST | Admitting: INTERNAL MEDICINE
Payer: MEDICARE

## 2019-09-30 VITALS
OXYGEN SATURATION: 89 % | TEMPERATURE: 98 F | DIASTOLIC BLOOD PRESSURE: 157 MMHG | WEIGHT: 154.98 LBS | RESPIRATION RATE: 28 BRPM | HEIGHT: 68 IN | HEART RATE: 130 BPM | SYSTOLIC BLOOD PRESSURE: 241 MMHG

## 2019-09-30 DIAGNOSIS — I50.9 HEART FAILURE, UNSPECIFIED: ICD-10-CM

## 2019-09-30 LAB
ALBUMIN SERPL ELPH-MCNC: 3.8 G/DL — SIGNIFICANT CHANGE UP (ref 3.3–5)
ALP SERPL-CCNC: 68 U/L — SIGNIFICANT CHANGE UP (ref 40–120)
ALT FLD-CCNC: 25 U/L — SIGNIFICANT CHANGE UP (ref 10–45)
ANION GAP SERPL CALC-SCNC: 11 MMOL/L — SIGNIFICANT CHANGE UP (ref 5–17)
ANION GAP SERPL CALC-SCNC: 16 MMOL/L — SIGNIFICANT CHANGE UP (ref 5–17)
APPEARANCE UR: CLEAR — SIGNIFICANT CHANGE UP
APTT BLD: 23.6 SEC — LOW (ref 27.5–36.3)
AST SERPL-CCNC: 43 U/L — HIGH (ref 10–40)
BILIRUB SERPL-MCNC: 0.7 MG/DL — SIGNIFICANT CHANGE UP (ref 0.2–1.2)
BILIRUB UR-MCNC: NEGATIVE — SIGNIFICANT CHANGE UP
BUN SERPL-MCNC: 27 MG/DL — HIGH (ref 7–23)
BUN SERPL-MCNC: 27 MG/DL — HIGH (ref 7–23)
CALCIUM SERPL-MCNC: 8.6 MG/DL — SIGNIFICANT CHANGE UP (ref 8.4–10.5)
CALCIUM SERPL-MCNC: 8.9 MG/DL — SIGNIFICANT CHANGE UP (ref 8.4–10.5)
CHLORIDE SERPL-SCNC: 108 MMOL/L — SIGNIFICANT CHANGE UP (ref 96–108)
CHLORIDE SERPL-SCNC: 110 MMOL/L — HIGH (ref 96–108)
CO2 BLDA-SCNC: 14 MMOL/L — LOW (ref 22–30)
CO2 BLDA-SCNC: 22 MMOL/L — SIGNIFICANT CHANGE UP (ref 22–30)
CO2 SERPL-SCNC: 22 MMOL/L — SIGNIFICANT CHANGE UP (ref 22–31)
CO2 SERPL-SCNC: 23 MMOL/L — SIGNIFICANT CHANGE UP (ref 22–31)
COLOR SPEC: YELLOW — SIGNIFICANT CHANGE UP
CREAT SERPL-MCNC: 1.13 MG/DL — SIGNIFICANT CHANGE UP (ref 0.5–1.3)
CREAT SERPL-MCNC: 1.22 MG/DL — SIGNIFICANT CHANGE UP (ref 0.5–1.3)
DIFF PNL FLD: ABNORMAL
FLU A RESULT: SIGNIFICANT CHANGE UP
FLU A RESULT: SIGNIFICANT CHANGE UP
FLUAV AG NPH QL: SIGNIFICANT CHANGE UP
FLUBV AG NPH QL: SIGNIFICANT CHANGE UP
GAS PNL BLDA: SIGNIFICANT CHANGE UP
GAS PNL BLDA: SIGNIFICANT CHANGE UP
GLUCOSE SERPL-MCNC: 112 MG/DL — HIGH (ref 70–99)
GLUCOSE SERPL-MCNC: 158 MG/DL — HIGH (ref 70–99)
GLUCOSE UR QL: NEGATIVE — SIGNIFICANT CHANGE UP
HCT VFR BLD CALC: 41.6 % — SIGNIFICANT CHANGE UP (ref 39–50)
HCT VFR BLD CALC: 47.1 % — SIGNIFICANT CHANGE UP (ref 39–50)
HGB BLD-MCNC: 13.7 G/DL — SIGNIFICANT CHANGE UP (ref 13–17)
HGB BLD-MCNC: 14.8 G/DL — SIGNIFICANT CHANGE UP (ref 13–17)
HOROWITZ INDEX BLDA+IHG-RTO: SIGNIFICANT CHANGE UP
HOROWITZ INDEX BLDA+IHG-RTO: SIGNIFICANT CHANGE UP
INR BLD: 1.05 RATIO — SIGNIFICANT CHANGE UP (ref 0.88–1.16)
KETONES UR-MCNC: NEGATIVE — SIGNIFICANT CHANGE UP
LACTATE SERPL-SCNC: 2.8 MMOL/L — HIGH (ref 0.7–2)
LEUKOCYTE ESTERASE UR-ACNC: NEGATIVE — SIGNIFICANT CHANGE UP
MAGNESIUM SERPL-MCNC: 1.9 MG/DL — SIGNIFICANT CHANGE UP (ref 1.6–2.6)
MCHC RBC-ENTMCNC: 31.4 GM/DL — LOW (ref 32–36)
MCHC RBC-ENTMCNC: 31.8 PG — SIGNIFICANT CHANGE UP (ref 27–34)
MCHC RBC-ENTMCNC: 32.6 PG — SIGNIFICANT CHANGE UP (ref 27–34)
MCHC RBC-ENTMCNC: 32.9 GM/DL — SIGNIFICANT CHANGE UP (ref 32–36)
MCV RBC AUTO: 101.1 FL — HIGH (ref 80–100)
MCV RBC AUTO: 99 FL — SIGNIFICANT CHANGE UP (ref 80–100)
NITRITE UR-MCNC: NEGATIVE — SIGNIFICANT CHANGE UP
NRBC # BLD: 0 /100 WBCS — SIGNIFICANT CHANGE UP (ref 0–0)
NRBC # BLD: 0 /100 WBCS — SIGNIFICANT CHANGE UP (ref 0–0)
NT-PROBNP SERPL-SCNC: 3492 PG/ML — HIGH (ref 0–300)
PCO2 BLDA: 35 MMHG — SIGNIFICANT CHANGE UP (ref 32–46)
PCO2 BLDA: 57 MMHG — HIGH (ref 32–46)
PH BLDA: 7.18 — CRITICAL LOW (ref 7.35–7.45)
PH BLDA: 7.41 — SIGNIFICANT CHANGE UP (ref 7.35–7.45)
PH UR: 6 — SIGNIFICANT CHANGE UP (ref 5–8)
PHOSPHATE SERPL-MCNC: 4 MG/DL — SIGNIFICANT CHANGE UP (ref 2.5–4.5)
PLATELET # BLD AUTO: 159 K/UL — SIGNIFICANT CHANGE UP (ref 150–400)
PLATELET # BLD AUTO: 178 K/UL — SIGNIFICANT CHANGE UP (ref 150–400)
PO2 BLDA: 103 MMHG — SIGNIFICANT CHANGE UP (ref 74–108)
PO2 BLDA: 305 MMHG — HIGH (ref 74–108)
POTASSIUM SERPL-MCNC: 4 MMOL/L — SIGNIFICANT CHANGE UP (ref 3.5–5.3)
POTASSIUM SERPL-MCNC: 4.8 MMOL/L — SIGNIFICANT CHANGE UP (ref 3.5–5.3)
POTASSIUM SERPL-SCNC: 4 MMOL/L — SIGNIFICANT CHANGE UP (ref 3.5–5.3)
POTASSIUM SERPL-SCNC: 4.8 MMOL/L — SIGNIFICANT CHANGE UP (ref 3.5–5.3)
PROCALCITONIN SERPL-MCNC: 0.13 NG/ML — HIGH
PROT SERPL-MCNC: 8.4 G/DL — HIGH (ref 6–8.3)
PROT UR-MCNC: 500 MG/DL
PROTHROM AB SERPL-ACNC: 11.8 SEC — SIGNIFICANT CHANGE UP (ref 10–12.9)
RAPID RVP RESULT: SIGNIFICANT CHANGE UP
RBC # BLD: 4.2 M/UL — SIGNIFICANT CHANGE UP (ref 4.2–5.8)
RBC # BLD: 4.66 M/UL — SIGNIFICANT CHANGE UP (ref 4.2–5.8)
RBC # FLD: 14.4 % — SIGNIFICANT CHANGE UP (ref 10.3–14.5)
RBC # FLD: 14.6 % — HIGH (ref 10.3–14.5)
RSV RESULT: SIGNIFICANT CHANGE UP
RSV RNA RESP QL NAA+PROBE: SIGNIFICANT CHANGE UP
SAO2 % BLDA: 95 % — SIGNIFICANT CHANGE UP (ref 92–96)
SAO2 % BLDA: >99 % — HIGH (ref 92–96)
SODIUM SERPL-SCNC: 144 MMOL/L — SIGNIFICANT CHANGE UP (ref 135–145)
SODIUM SERPL-SCNC: 146 MMOL/L — HIGH (ref 135–145)
SP GR SPEC: 1.01 — SIGNIFICANT CHANGE UP (ref 1.01–1.02)
TROPONIN I SERPL-MCNC: 0.36 NG/ML — HIGH (ref 0.02–0.06)
TROPONIN I SERPL-MCNC: 0.63 NG/ML — HIGH (ref 0.02–0.06)
TROPONIN I SERPL-MCNC: 0.71 NG/ML — HIGH (ref 0.02–0.06)
TROPONIN I SERPL-MCNC: 0.93 NG/ML — HIGH (ref 0.02–0.06)
UROBILINOGEN FLD QL: NEGATIVE — SIGNIFICANT CHANGE UP
WBC # BLD: 12.85 K/UL — HIGH (ref 3.8–10.5)
WBC # BLD: 15.87 K/UL — HIGH (ref 3.8–10.5)
WBC # FLD AUTO: 12.85 K/UL — HIGH (ref 3.8–10.5)
WBC # FLD AUTO: 15.87 K/UL — HIGH (ref 3.8–10.5)

## 2019-09-30 PROCEDURE — 99223 1ST HOSP IP/OBS HIGH 75: CPT

## 2019-09-30 PROCEDURE — 71045 X-RAY EXAM CHEST 1 VIEW: CPT | Mod: 26

## 2019-09-30 PROCEDURE — 93010 ELECTROCARDIOGRAM REPORT: CPT

## 2019-09-30 PROCEDURE — 99291 CRITICAL CARE FIRST HOUR: CPT

## 2019-09-30 PROCEDURE — 93306 TTE W/DOPPLER COMPLETE: CPT | Mod: 26

## 2019-09-30 PROCEDURE — 93010 ELECTROCARDIOGRAM REPORT: CPT | Mod: 77

## 2019-09-30 RX ORDER — CEFTRIAXONE 500 MG/1
INJECTION, POWDER, FOR SOLUTION INTRAMUSCULAR; INTRAVENOUS
Refills: 0 | Status: DISCONTINUED | OUTPATIENT
Start: 2019-09-30 | End: 2019-09-30

## 2019-09-30 RX ORDER — NITROGLYCERIN 6.5 MG
0.4 CAPSULE, EXTENDED RELEASE ORAL ONCE
Refills: 0 | Status: COMPLETED | OUTPATIENT
Start: 2019-09-30 | End: 2019-09-30

## 2019-09-30 RX ORDER — CEFTRIAXONE 500 MG/1
1000 INJECTION, POWDER, FOR SOLUTION INTRAMUSCULAR; INTRAVENOUS ONCE
Refills: 0 | Status: COMPLETED | OUTPATIENT
Start: 2019-09-30 | End: 2019-09-30

## 2019-09-30 RX ORDER — INFLUENZA VIRUS VACCINE 15; 15; 15; 15 UG/.5ML; UG/.5ML; UG/.5ML; UG/.5ML
0.5 SUSPENSION INTRAMUSCULAR ONCE
Refills: 0 | Status: DISCONTINUED | OUTPATIENT
Start: 2019-09-30 | End: 2019-10-03

## 2019-09-30 RX ORDER — FUROSEMIDE 40 MG
40 TABLET ORAL ONCE
Refills: 0 | Status: COMPLETED | OUTPATIENT
Start: 2019-09-30 | End: 2019-09-30

## 2019-09-30 RX ORDER — AZITHROMYCIN 500 MG/1
500 TABLET, FILM COATED ORAL EVERY 24 HOURS
Refills: 0 | Status: DISCONTINUED | OUTPATIENT
Start: 2019-09-30 | End: 2019-09-30

## 2019-09-30 RX ORDER — FUROSEMIDE 40 MG
40 TABLET ORAL DAILY
Refills: 0 | Status: DISCONTINUED | OUTPATIENT
Start: 2019-09-30 | End: 2019-10-02

## 2019-09-30 RX ORDER — AZITHROMYCIN 500 MG/1
500 TABLET, FILM COATED ORAL ONCE
Refills: 0 | Status: COMPLETED | OUTPATIENT
Start: 2019-09-30 | End: 2019-09-30

## 2019-09-30 RX ORDER — HYDRALAZINE HCL 50 MG
20 TABLET ORAL
Refills: 0 | Status: DISCONTINUED | OUTPATIENT
Start: 2019-09-30 | End: 2019-10-01

## 2019-09-30 RX ORDER — HYDRALAZINE HCL 50 MG
20 TABLET ORAL ONCE
Refills: 0 | Status: COMPLETED | OUTPATIENT
Start: 2019-09-30 | End: 2019-09-30

## 2019-09-30 RX ORDER — ASPIRIN/CALCIUM CARB/MAGNESIUM 324 MG
300 TABLET ORAL ONCE
Refills: 0 | Status: COMPLETED | OUTPATIENT
Start: 2019-09-30 | End: 2019-09-30

## 2019-09-30 RX ORDER — ENOXAPARIN SODIUM 100 MG/ML
40 INJECTION SUBCUTANEOUS DAILY
Refills: 0 | Status: DISCONTINUED | OUTPATIENT
Start: 2019-09-30 | End: 2019-09-30

## 2019-09-30 RX ORDER — ENOXAPARIN SODIUM 100 MG/ML
70 INJECTION SUBCUTANEOUS
Refills: 0 | Status: DISCONTINUED | OUTPATIENT
Start: 2019-09-30 | End: 2019-10-01

## 2019-09-30 RX ORDER — CHLORHEXIDINE GLUCONATE 213 G/1000ML
1 SOLUTION TOPICAL
Refills: 0 | Status: DISCONTINUED | OUTPATIENT
Start: 2019-09-30 | End: 2019-10-03

## 2019-09-30 RX ORDER — NITROGLYCERIN 6.5 MG
10 CAPSULE, EXTENDED RELEASE ORAL
Qty: 50 | Refills: 0 | Status: DISCONTINUED | OUTPATIENT
Start: 2019-09-30 | End: 2019-09-30

## 2019-09-30 RX ADMIN — Medication 0.4 MILLIGRAM(S): at 01:22

## 2019-09-30 RX ADMIN — Medication 20 MILLIGRAM(S): at 02:17

## 2019-09-30 RX ADMIN — CEFTRIAXONE 100 MILLIGRAM(S): 500 INJECTION, POWDER, FOR SOLUTION INTRAMUSCULAR; INTRAVENOUS at 01:41

## 2019-09-30 RX ADMIN — CHLORHEXIDINE GLUCONATE 1 APPLICATION(S): 213 SOLUTION TOPICAL at 05:23

## 2019-09-30 RX ADMIN — Medication 40 MILLIGRAM(S): at 20:04

## 2019-09-30 RX ADMIN — Medication 20 MILLIGRAM(S): at 05:20

## 2019-09-30 RX ADMIN — ENOXAPARIN SODIUM 70 MILLIGRAM(S): 100 INJECTION SUBCUTANEOUS at 17:38

## 2019-09-30 RX ADMIN — CEFTRIAXONE 1000 MILLIGRAM(S): 500 INJECTION, POWDER, FOR SOLUTION INTRAMUSCULAR; INTRAVENOUS at 02:11

## 2019-09-30 RX ADMIN — Medication 300 MILLIGRAM(S): at 02:05

## 2019-09-30 RX ADMIN — Medication 40 MILLIGRAM(S): at 01:21

## 2019-09-30 RX ADMIN — ENOXAPARIN SODIUM 70 MILLIGRAM(S): 100 INJECTION SUBCUTANEOUS at 05:21

## 2019-09-30 RX ADMIN — AZITHROMYCIN 255 MILLIGRAM(S): 500 TABLET, FILM COATED ORAL at 02:25

## 2019-09-30 NOTE — PROVIDER CONTACT NOTE (EICU) - SITUATION
Pt is a 85 y m with Hx chronic systolic and diastolic CHF, AFIB, previous intubation- who came with 3 days of progressive SOB. Hypoxemic  on arrival in ER with  and diffuse rales. Placed on BIPAP, sublingual NTG and gentle diuresis in ER and noticed gradual improvement in clinical status.  Pt admitted  to ICU for management of CHF/ resp failure/ pulmonary edema

## 2019-09-30 NOTE — ED ADULT NURSE NOTE - NSIMPLEMENTINTERV_GEN_ALL_ED
Implemented All Fall Risk Interventions:  [place] to call system. Call bell, personal items and telephone within reach. Instruct patient to call for assistance. Room bathroom lighting operational. Non-slip footwear when patient is off stretcher. Physically safe environment: no spills, clutter or unnecessary equipment. Stretcher in lowest position, wheels locked, appropriate side rails in place. Provide visual cue, wrist band, yellow gown, etc. Monitor gait and stability. Monitor for mental status changes and reorient to person, place, and time. Review medications for side effects contributing to fall risk. Reinforce activity limits and safety measures with patient and family.

## 2019-09-30 NOTE — DIETITIAN INITIAL EVALUATION ADULT. - OTHER INFO
85M w/ diastolic heart failure, HTN arrives to ED with several day hx of worsening SOB. This morning it got worse, patient was noted to be hypoxic & noted hypertensive with BP of 140/100. ABG showed acute hypercapneic resp. failure. STAT CXR showed bl pulmonary edema,. Patient noted tolerating diet , Skin intact(+) BM ((/29)(+1) ankle edema noted.

## 2019-09-30 NOTE — CONSULT NOTE ADULT - ASSESSMENT
85 year old man admitted with worsening shortness of breath... currently improved post diuresis and BIPAP.  He has a known history of diastolic CHF.  Chronic AF, s/p CVA (mild residual deficit)... not on anticoagulation due to him refusing.  HTN  He has not been compliant with home medical regimen.  CHF exacerbation likely precipitated by severe HTN and patient not being compliant with home medications  Elevated troponin likely due to demand ischemia    Plan  - continue IV Lasix  - consider ACE-I for elevated BP  - consider metoprolol for rate control of AF  - he is at risk for thromboembolism... resume a/c  - review records especially cath report if available  - may need to repeat  ischemia evaluation  - further recommendations pending hospital course and workup     discussed with ICU team  discussed with patient 85 year old man admitted with worsening shortness of breath... currently improved post diuresis and BIPAP.  He has a known history of  CHF.  Chronic AF, s/p CVA (mild residual deficit)... not on anticoagulation due to him refusing.  HTN  He has not been compliant with home medical regimen.  CHF exacerbation likely precipitated by severe HTN and patient not being compliant with home medications  Elevated troponin likely due to demand ischemia    Plan  - continue IV Lasix  - consider ACE-I for elevated BP  - consider metoprolol for rate control of AF.. watch for bradycardia   - he is at risk for thromboembolism... resume a/c  - review records especially cath report if available  - may need to repeat  ischemia evaluation  - further recommendations pending hospital course and workup     discussed with ICU team	  discussed with patient

## 2019-09-30 NOTE — ED ADULT NURSE NOTE - OBJECTIVE STATEMENT
Pt a&o x3 BIB by EMS from home c/o difficulty breathing. Pt has hx of heart failure and afib. As per EMS, pt has been having chest tightness and difficulty breathing for the past few days and has been worsening. Pt presents to ED anxious, diaphoretic, hypertensive and c/o chest tightness. Pt on NRB mask and placed on bedside monitor. MD Kim called to bedside, respiratory therapist called to place pt on bipap.

## 2019-09-30 NOTE — PROVIDER CONTACT NOTE (EICU) - ASSESSMENT
85 Y M with acute resp failure on NIV - BIPAP, decompensated systolic and diastolic CHF, hypertensive urgency/ pulmonary edema/ r/o ischemic event/ r/o infection

## 2019-09-30 NOTE — PROGRESS NOTE ADULT - SUBJECTIVE AND OBJECTIVE BOX
Follow-up Critical Care Progress Note  Chief Complaint : Heart failure    Presented with shortness of breath and chest pain. Initial EKG showing Rapid afib. Hypertensive on admission. Required bipap support which improved with diuresis. This morning seen at bedside on NC and seems comfortable. States he is supposed to be taking "water pill" every day but has not been taking it. Also is supposed to be on Eliquis but stopped taking it because he felt he was taking too many medications.     Allergies :No Known Allergies    PAST MEDICAL & SURGICAL HISTORY:  Hypertension, unspecified type  CVA (cerebral vascular accident)  Congestive heart failure (CHF)  Afib  No significant past surgical history      Medications:  MEDICATIONS  (STANDING):  azithromycin  IVPB 500 milliGRAM(s) IV Intermittent every 24 hours  cefTRIAXone   IVPB      chlorhexidine 4% Liquid 1 Application(s) Topical <User Schedule>  enoxaparin Injectable 70 milliGRAM(s) SubCutaneous two times a day  furosemide   Injectable 40 milliGRAM(s) IV Push daily  influenza   Vaccine 0.5 milliLiter(s) IntraMuscular once  nitroglycerin  Infusion 10 MICROgram(s)/Min (3 mL/Hr) IV Continuous <Continuous>    MEDICATIONS  (PRN):  hydrALAZINE Injectable 20 milliGRAM(s) IV Push every 2 hours PRN SBP >180      LABS:                        13.7   12.85 )-----------( 159      ( 30 Sep 2019 06:25 )             41.6     09-    144  |  110<H>  |  27<H>  ----------------------------<  158<H>  4.8   |  23  |  1.22    Ca    8.9      30 Sep 2019 01:00    TPro  8.4<H>  /  Alb  3.8  /  TBili  0.7  /  DBili  x   /  AST  43<H>  /  ALT  25  /  AlkPhos  68  09-30      CARDIAC MARKERS ( 30 Sep 2019 06:25 )  .626 ng/mL / x     / x     / x     / x      CARDIAC MARKERS ( 30 Sep 2019 01:00 )  .361 ng/mL / x     / x     / x     / x            PT/INR - ( 30 Sep 2019 01:00 )   PT: 11.8 sec;   INR: 1.05 ratio         PTT - ( 30 Sep 2019 01:00 )  PTT:23.6 sec  Urinalysis Basic - ( 30 Sep 2019 02:35 )    Color: Yellow / Appearance: Clear / S.010 / pH: x  Gluc: x / Ketone: Negative  / Bili: Negative / Urobili: Negative   Blood: x / Protein: 500 mg/dL / Nitrite: Negative   Leuk Esterase: Negative / RBC: 5-10 /HPF / WBC 0-2 /HPF   Sq Epi: x / Non Sq Epi: Neg.-Few / Bacteria: Negative /HPF      Procalcitonin, Serum: 0.13 ng/mL (19 @ 06:25)    Serum Pro-Brain Natriuretic Peptide: 3492 pg/mL (19 @ 01:00)  CULTURES: (if applicable)      ABG - ( 30 Sep 2019 04:38 )  pH, Arterial: 7.41  pH, Blood: x     /  pCO2: 35    /  pO2: 305   / HCO3: x     / Base Excess: x     /  SaO2: >99       CAPILLARY BLOOD GLUCOSE    VITALS:  T(C): 36.4 (19 @ 08:00), Max: 37.6 (19 @ 01:16)  T(F): 97.6 (19 @ 08:00), Max: 99.7 (19 @ 01:16)  HR: 74 (19 10:00) (74 - 138)  BP: 144/76 (19 @ 10:00) (113/69 - 241/157)  BP(mean): 91 (19 @ 10:00) (83 - 175)  ABP: --  ABP(mean): --  RR: 42 (19 10:00) (18 - 42)  SpO2: 98% (19 @ 10:00) (87% - 100%)  CVP(mm Hg): --  CVP(cm H2O): --    Ins and Outs     19 @ 07:01  -  19 @ 07:00  --------------------------------------------------------  IN: 0 mL / OUT: 500 mL / NET: -500 mL    19 @ 07:01  -  19 @ 10:44  --------------------------------------------------------  IN: 0 mL / OUT: 150 mL / NET: -150 mL        Height (cm): 167.6 (19 @ 03:38)  Weight (kg): 80.4 (19 @ 03:38)  BMI (kg/m2): 28.6 (19 @ 03:38)        I&O's Detail    29 Sep 2019 07:  -  30 Sep 2019 07:00  --------------------------------------------------------  IN:  Total IN: 0 mL    OUT:    Indwelling Catheter - Urethral: 500 mL  Total OUT: 500 mL    Total NET: -500 mL      30 Sep 2019 07:  -  30 Sep 2019 10:44  --------------------------------------------------------  IN:  Total IN: 0 mL    OUT:    Indwelling Catheter - Urethral: 150 mL  Total OUT: 150 mL    Total NET: -150 mL

## 2019-09-30 NOTE — ED PROVIDER NOTE - SECONDARY DIAGNOSIS.
Acute respiratory failure with hypoxia and hypercapnia NSTEMI (non-ST elevated myocardial infarction)

## 2019-09-30 NOTE — PROVIDER CONTACT NOTE (EICU) - RECOMMENDATIONS
ICU management  BIPAP support  telemetry, serial cardiac enzymes, EKG, ECHO  Hydralazine if BP still  elevated after diuresis and  BIPAP support in progress  strict I/O monitoring   f/up cultures,  Ok with empiric Ceftriaxone/ Zithromax for now

## 2019-09-30 NOTE — ED PROVIDER NOTE - OBJECTIVE STATEMENT
pt p/w severe sob tonight, gradually worsening over last few days.  assoc c chest tightness tonight.  no fever, cough.  h/o afib, CHF.  EMS states pt was hypoxic, 72% on RA, up to 90s with o2.  given duoneb en route for wheezing per ems. SBP high, 240s

## 2019-09-30 NOTE — ED PROVIDER NOTE - PROGRESS NOTE DETAILS
pt with most likely acute decompensated CHF.  cxr c vascular congestion, cant r/o underlying infiltrate/pna.  bcx, lactate ordered. holding sepsis iv fluids due to acute CHF/fluid overload. pt clinically much improved after bipap and ntg x 3.  appears much less distressed.  HR down to 90s from 140s.  SBP down to 170s from 240.  ICU was consulted. in ED evaluating pt.

## 2019-09-30 NOTE — H&P ADULT - HISTORY OF PRESENT ILLNESS
85M w/ diastolic heart failure, HTN arrives to ED with several day hx of worseing SOB. This morning it got worse, patient was noted to be hypoxic by EMS. Started on NRB mask. In ED switched to BiPAP for significantl;y increased WOB. Also was hypertensive with BP of 140/100 on arrival. ABG showed acute hypercapneic resp. failure. STAT CXR showed bl pulmonary edema, no obvious infiltrate but ahrd to differentiate from fluid. Was borderline febrile with temperature of 99.9F.   -prior to my arrival patient was given SL STACEY x 3, lasix 40, and doses of azithromycin and rocephin.   -Status seems much improved from initial presentation. Patietn able to laugh and joke during my interview. Current /70, HR in ivonne 90's afib. EKG on arrival showed ST depressions in V4-6. Currently no chest pain, mild trop of 0.3 (will get ASA now), mild eelvation of lactate to 2.8.   -

## 2019-09-30 NOTE — ED PROVIDER NOTE - CLINICAL SUMMARY MEDICAL DECISION MAKING FREE TEXT BOX
acute respiratory distress.  most likely CHF.  r/o pna, acs, mi.  start bipap. give nitro, lasix.  check labs, cxr

## 2019-09-30 NOTE — ED PROVIDER NOTE - CARE PLAN
Principal Discharge DX:	Acute on chronic congestive heart failure, unspecified heart failure type  Secondary Diagnosis:	Acute respiratory failure with hypoxia and hypercapnia  Secondary Diagnosis:	NSTEMI (non-ST elevated myocardial infarction)

## 2019-09-30 NOTE — H&P ADULT - ASSESSMENT
ASSESSMENT:    85M w/ diastolic heart failure, HTN arrives to ED with several day hx of worseing SOB. This morning it got worse, patient was noted to be hypoxic by EMS. Started on NRB mask. In ED switched to BiPAP for significantl;y increased WOB. Also was hypertensive with BP of 140/100 on arrival. ABG showed acute hypercapneic resp. failure. STAT CXR showed bl pulmonary edema, no obvious infiltrate but ahrd to differentiate from fluid. Was borderline febrile with temperature of 99.9F.   -prior to my arrival patient was given SL STACEY x 3, lasix 40, and doses of azithromycin and rocephin.   -Status seems much improved from initial presentation. Patietn able to laugh and joke during my interview. Current /70, HR in ivonne 90's afib. EKG on arrival showed ST depressions in V4-6. Currently no chest pain, mild trop of 0.3 (will get ASA now), mild eelvation of lactate to 2.8.     PLAN:    #CHF Exacerbation w/ HTN urgency and Hypercapneic resp./ failure requiring BiPAP  -Currently on BiPAP, will repeat ABG  -will titrate IPAP and EPAP to maintain pH >7.30 and SaO2 >92%  -alarms reviewed  -NPO while on BiPAP   -BP control, will use IVP hydralazine for now, if remains high will start tridil drip, but BP currently with SBP in 180's 9arrival BP was 240).  -received lasix, joshua santamaria, is making urinary response to initial dose. Will schedule for daily lasix and follow lytes while diuresing  -when off BiPAp re-start PO anti-hypertensives  -check echo      #Febrile  -will send rapid viral panel and continue on rocephin zithromax for CAP coverage, morning procalcitonin level, if (-) consider stoppign anbx    #Afib  -on eliquis at home. Will start full dose lovenox now and when off BiPAp re-start eliquis

## 2019-09-30 NOTE — ED ADULT NURSE REASSESSMENT NOTE - NS ED NURSE REASSESS COMMENT FT1
Sepsis labs ordered as per MD order. As per MD Kim, pt to receive IV abx. Pt not candidate for fluid bolus due to PMH of heart failure as per MD Kim. Pt on bedside monitored with bipap in place. Will continue to monitor.

## 2019-09-30 NOTE — CONSULT NOTE ADULT - SUBJECTIVE AND OBJECTIVE BOX
[facility] Consultation    CHIEF COMPLAINT: Patient is a 85y old  Male who presents with a chief complaint of Worsening SOB (30 Sep 2019 02:11)  patient seen and examined  chart reviewed  cased discussed with Dr. Sr  patient is only fairly reliable with respect to giving the history     HPI:  85M w/ diastolic heart failure, HTN arrives to ED with several day hx of worseing SOB. This morning it got worse, patient was noted to be hypoxic by EMS. Started on NRB mask. In ED switched to BiPAP for significantl;y increased WOB. Also was hypertensive with BP of 140/100 on arrival. ABG showed acute hypercapneic resp. failure. STAT CXR showed bl pulmonary edema, no obvious infiltrate but ahrd to differentiate from fluid. Was borderline febrile with temperature of 99.9F.   -prior to my arrival patient was given SL STACEY x 3, lasix 40, and doses of azithromycin and rocephin.   -Status seems much improved from initial presentation. Patietn able to laugh and joke during my interview. Current /70, HR in ivonne 90's afib. EKG on arrival showed ST depressions in V4-6. Currently no chest pain, mild trop of 0.3 (will get ASA now), mild eelvation of lactate to 2.8.   - (30 Sep 2019 02:11)      PAST MEDICAL & SURGICAL HISTORY:  s/p previous cardiac caths... last was about 3 years ago... non intervention   Hypertension, unspecified type  CVA (cerebral vascular accident)  Congestive heart failure (CHF)  Afib  No significant past surgical history      SOCIAL HISTORY: unknown if smokes or drinks     FAMILY HISTORY:  No pertinent family history in first degree relatives    Home Medications:  aspirin 81 mg oral tablet: 1 tab(s) orally once a day (31 May 2019 15:50)    MEDICATIONS  (STANDING):  chlorhexidine 4% Liquid 1 Application(s) Topical <User Schedule>  enoxaparin Injectable 70 milliGRAM(s) SubCutaneous two times a day  furosemide   Injectable 40 milliGRAM(s) IV Push daily  influenza   Vaccine 0.5 milliLiter(s) IntraMuscular once  nitroglycerin  Infusion 10 MICROgram(s)/Min (3 mL/Hr) IV Continuous <Continuous>    MEDICATIONS  (PRN):  hydrALAZINE Injectable 20 milliGRAM(s) IV Push every 2 hours PRN SBP >180      Allergies    No Known Allergies    Intolerances        REVIEW OF SYSTEMS:    CONSTITUTIONAL:  weakness  EYES: No visual changes, No diplopia  ENMT: No throat pain , No exudate  NECK: No pain or stiffness  RESPIRATORY: No cough, wheezing, hemoptysis;   CARDIOVASCULAR: No chest pain or chest pressure.   No palpitations, dizziness, light headedness, syncope or near syncope.  No edema, no orthopnea.   GASTROINTESTINAL: No abdominal pain. No nausea, vomiting, or hematemesis; No diarrhea or constipation. No melena or hematochezia.  GENITOURINARY: No dysuria, frequency or hematuria  NEUROLOGICAL: No numbness or weakness  SKIN: No itching or rash  All other review of systems is negative unless indicated above    VITAL SIGNS:   Vital Signs Last 24 Hrs  T(C): 36.4 (30 Sep 2019 08:00), Max: 37.6 (30 Sep 2019 01:16)  T(F): 97.6 (30 Sep 2019 08:00), Max: 99.7 (30 Sep 2019 01:16)  HR: 74 (30 Sep 2019 10:00) (74 - 138)  BP: 144/76 (30 Sep 2019 10:00) (113/69 - 241/157)  BP(mean): 91 (30 Sep 2019 10:00) (83 - 175)  RR: 42 (30 Sep 2019 10:00) (18 - 42)  SpO2: 98% (30 Sep 2019 10:00) (87% - 100%)    I&O's Summary    29 Sep 2019 07:01  -  30 Sep 2019 07:00  --------------------------------------------------------  IN: 0 mL / OUT: 500 mL / NET: -500 mL    30 Sep 2019 07:01  -  30 Sep 2019 10:47  --------------------------------------------------------  IN: 0 mL / OUT: 150 mL / NET: -150 mL        PHYSICAL EXAM:    Constitutional: NAD, awake and alert, well-developed  Eyes:  EOMI,  Pupils round, no lesions  ENMT: no exudate or erythema  Pulmonary: decreased breath sounds bilateral   Cardiovascular:  irregular S1 and S2.  ll/Vl systolic murmur   Gastrointestinal: Bowel Sounds present, soft, nontender.   Lymph: No peripheral edema. No cervical lymphadenopathy.  Neurological: Alert, no focal deficits  Skin: No rashes. Changes of chronic venous stasis. No cyanosis.  Psych:  Mood & affect appropriate    LABS: All Labs Reviewed:                        13.7   12.85 )-----------( 159      ( 30 Sep 2019 06:25 )             41.6                         14.8   15.87 )-----------( 178      ( 30 Sep 2019 01:00 )             47.1     30 Sep 2019 01:00    144    |  110    |  27     ----------------------------<  158    4.8     |  23     |  1.22     Ca    8.9        30 Sep 2019 01:00    TPro  8.4    /  Alb  3.8    /  TBili  0.7    /  DBili  x      /  AST  43     /  ALT  25     /  AlkPhos  68     30 Sep 2019 01:00    PT/INR - ( 30 Sep 2019 01:00 )   PT: 11.8 sec;   INR: 1.05 ratio         PTT - ( 30 Sep 2019 01:00 )  PTT:23.6 sec  CARDIAC MARKERS ( 30 Sep 2019 06:25 )  .626 ng/mL / x     / x     / x     / x      CARDIAC MARKERS ( 30 Sep 2019 01:00 )  .361 ng/mL / x     / x     / x     / x            09-30 @ 01:00  Pro Bnp [id number]        RADIOLOGY:  < from: Xray Chest 1 View AP/PA (09.30.19 @ 01:25) >    Findings consistent with moderate CHF or less likely bilateral pneumonia    < end of copied text >    EKG:  < from: 12 Lead ECG (09.30.19 @ 01:09) >  Atrial fibrillation with rapid ventricular response  Nonspecific ST and T wave abnormality  Abnormal ECG  When compared with ECG of 01-JUN-2019 05:40,  Vent. rate has increased BY  83 BPM  T wave amplitude has increased in Anterior leads    < end of copied text >    < from: TTE Echo Complete w/Doppler (06.01.19 @ 13:09) >   1. Left ventricular ejection fraction, by visual estimation, is 38%.   2. Moderately decreased global left ventricular systolic function.   3. Spectral Doppler shows restrictive pattern of left ventricular   myocardial filling (Grade III diastolic dysfunction).   4. Moderate mitral stenosis.   5. Moderate thickening and calcification of the anterior and posterior   mitral valve leaflets.   6. Mild aortic regurgitation.   7. Estimated pulmonary artery systolic pressure is 38.3 mmHg assuming a   right atrial pressure of 10 mmHg, which is consistent with borderline   pulmonary hypertension.   8. Pulmonary hypertension is present.   9. LA volume Index is 44.5 ml/m² ml/m2.    < end of copied text >

## 2019-10-01 LAB
CULTURE RESULTS: NO GROWTH — SIGNIFICANT CHANGE UP
SPECIMEN SOURCE: SIGNIFICANT CHANGE UP

## 2019-10-01 PROCEDURE — 99233 SBSQ HOSP IP/OBS HIGH 50: CPT

## 2019-10-01 RX ORDER — METOPROLOL TARTRATE 50 MG
25 TABLET ORAL
Refills: 0 | Status: DISCONTINUED | OUTPATIENT
Start: 2019-10-01 | End: 2019-10-01

## 2019-10-01 RX ORDER — METOPROLOL TARTRATE 50 MG
25 TABLET ORAL DAILY
Refills: 0 | Status: DISCONTINUED | OUTPATIENT
Start: 2019-10-01 | End: 2019-10-02

## 2019-10-01 RX ORDER — LOSARTAN POTASSIUM 100 MG/1
100 TABLET, FILM COATED ORAL DAILY
Refills: 0 | Status: DISCONTINUED | OUTPATIENT
Start: 2019-10-01 | End: 2019-10-03

## 2019-10-01 RX ORDER — ASPIRIN/CALCIUM CARB/MAGNESIUM 324 MG
81 TABLET ORAL DAILY
Refills: 0 | Status: DISCONTINUED | OUTPATIENT
Start: 2019-10-01 | End: 2019-10-03

## 2019-10-01 RX ORDER — APIXABAN 2.5 MG/1
5 TABLET, FILM COATED ORAL
Refills: 0 | Status: DISCONTINUED | OUTPATIENT
Start: 2019-10-01 | End: 2019-10-03

## 2019-10-01 RX ADMIN — LOSARTAN POTASSIUM 100 MILLIGRAM(S): 100 TABLET, FILM COATED ORAL at 16:00

## 2019-10-01 RX ADMIN — ENOXAPARIN SODIUM 70 MILLIGRAM(S): 100 INJECTION SUBCUTANEOUS at 05:15

## 2019-10-01 RX ADMIN — Medication 40 MILLIGRAM(S): at 05:15

## 2019-10-01 RX ADMIN — Medication 25 MILLIGRAM(S): at 15:59

## 2019-10-01 RX ADMIN — APIXABAN 5 MILLIGRAM(S): 2.5 TABLET, FILM COATED ORAL at 18:19

## 2019-10-01 RX ADMIN — Medication 81 MILLIGRAM(S): at 16:00

## 2019-10-01 RX ADMIN — CHLORHEXIDINE GLUCONATE 1 APPLICATION(S): 213 SOLUTION TOPICAL at 06:00

## 2019-10-01 NOTE — PROGRESS NOTE ADULT - SUBJECTIVE AND OBJECTIVE BOX
Follow up for: afib, chf    SUBJ: no complaints    PMH  Hypertension, unspecified type  CVA (cerebral vascular accident)  Congestive heart failure (CHF)  Afib      MEDICATIONS  (STANDING):  apixaban 5 milliGRAM(s) Oral two times a day  aspirin enteric coated 81 milliGRAM(s) Oral daily  chlorhexidine 4% Liquid 1 Application(s) Topical <User Schedule>  furosemide   Injectable 40 milliGRAM(s) IV Push daily  influenza   Vaccine 0.5 milliLiter(s) IntraMuscular once  losartan 100 milliGRAM(s) Oral daily  metoprolol succinate ER 25 milliGRAM(s) Oral daily    MEDICATIONS  (PRN):        PHYSICAL EXAM:  Vital Signs Last 24 Hrs  T(C): 37.1 (01 Oct 2019 20:53), Max: 37.1 (01 Oct 2019 20:53)  T(F): 98.8 (01 Oct 2019 20:53), Max: 98.8 (01 Oct 2019 20:53)  HR: 71 (01 Oct 2019 20:53) (66 - 98)  BP: 149/73 (01 Oct 2019 20:53) (149/73 - 173/87)  BP(mean): --  RR: 15 (01 Oct 2019 20:53) (15 - 18)  SpO2: 99% (01 Oct 2019 20:53) (96% - 99%)    GENERAL: NAD, well-groomed, well-developed  HEAD:  Atraumatic, Normocephalic  EYES: EOMI, PERRLA, conjunctiva and sclera clear  ENT: Moist mucous membranes,  NECK: Supple, No JVD, no bruits  CHEST/LUNG: Clear to percussion bilaterally; No rales, rhonchi, wheezing, or rubs  HEART: Regular rate and rhythm; No murmurs, rubs, or gallops PMI non displaced.  ABDOMEN: Soft, Nontender, Nondistended; Bowel sounds present  EXTREMITIES:  2+ Peripheral Pulses, No clubbing, cyanosis, or edema  SKIN: No rashes or lesions  NERVOUS SYSTEM:  Alert & Oriented X3, Good concentration; Motor Strength 5/5 B/L upper and lower extremities; DTRs 2+ intact and symmetric          LABS:                        13.7   12.85 )-----------( 159      ( 30 Sep 2019 06:25 )             41.6     09-30    146<H>  |  108  |  27<H>  ----------------------------<  112<H>  4.0   |  22  |  1.13    Ca    8.6      30 Sep 2019 06:25  Phos  4.0     09-30  Mg     1.9     09-30    TPro  8.4<H>  /  Alb  3.8  /  TBili  0.7  /  DBili  x   /  AST  43<H>  /  ALT  25  /  AlkPhos  68  09-30    CARDIAC MARKERS ( 30 Sep 2019 21:45 )  .710 ng/mL / x     / x     / x     / x      CARDIAC MARKERS ( 30 Sep 2019 12:23 )  .925 ng/mL / x     / x     / x     / x      CARDIAC MARKERS ( 30 Sep 2019 06:25 )  .626 ng/mL / x     / x     / x     / x      CARDIAC MARKERS ( 30 Sep 2019 01:00 )  .361 ng/mL / x     / x     / x     / x          PT/INR - ( 30 Sep 2019 01:00 )   PT: 11.8 sec;   INR: 1.05 ratio         PTT - ( 30 Sep 2019 01:00 )  PTT:23.6 sec    I&O's Summary    30 Sep 2019 07:01  -  01 Oct 2019 07:00  --------------------------------------------------------  IN: 220 mL / OUT: 1185 mL / NET: -965 mL    01 Oct 2019 07:01  -  01 Oct 2019 21:03  --------------------------------------------------------  IN: 600 mL / OUT: 900 mL / NET: -300 mL            ASSESMENT AND PLAN:    Continue current management

## 2019-10-01 NOTE — PROGRESS NOTE ADULT - SUBJECTIVE AND OBJECTIVE BOX
Follow-up Critical Care Progress Note  Chief Complaint : Heart failure    Awake and alert. Not in distress. Denies any chest pain or difficulty breathing. Used bipap overnight. States at home he sleeps in a recliner.     Allergies :No Known Allergies      PAST MEDICAL & SURGICAL HISTORY:  Hypertension, unspecified type  CVA (cerebral vascular accident)  Congestive heart failure (CHF)  Afib  No significant past surgical history      Medications:  MEDICATIONS  (STANDING):  apixaban 5 milliGRAM(s) Oral two times a day  aspirin enteric coated 81 milliGRAM(s) Oral daily  chlorhexidine 4% Liquid 1 Application(s) Topical <User Schedule>  furosemide   Injectable 40 milliGRAM(s) IV Push daily  influenza   Vaccine 0.5 milliLiter(s) IntraMuscular once  losartan 100 milliGRAM(s) Oral daily  metoprolol succinate ER 25 milliGRAM(s) Oral daily    MEDICATIONS  (PRN):      LABS:                        13.7   12.85 )-----------( 159      ( 30 Sep 2019 06:25 )             41.6     -30    146<H>  |  108  |  27<H>  ----------------------------<  112<H>  4.0   |  22  |  1.13    Ca    8.6      30 Sep 2019 06:25  Phos  4.0     -30  Mg     1.9         TPro  8.4<H>  /  Alb  3.8  /  TBili  0.7  /  DBili  x   /  AST  43<H>  /  ALT  25  /  AlkPhos  68  09-30      CARDIAC MARKERS ( 30 Sep 2019 21:45 )  .710 ng/mL / x     / x     / x     / x      CARDIAC MARKERS ( 30 Sep 2019 12:23 )  .925 ng/mL / x     / x     / x     / x      CARDIAC MARKERS ( 30 Sep 2019 06:25 )  .626 ng/mL / x     / x     / x     / x      CARDIAC MARKERS ( 30 Sep 2019 01:00 )  .361 ng/mL / x     / x     / x     / x            PT/INR - ( 30 Sep 2019 01:00 )   PT: 11.8 sec;   INR: 1.05 ratio         PTT - ( 30 Sep 2019 01:00 )  PTT:23.6 sec  Urinalysis Basic - ( 30 Sep 2019 02:35 )    Color: Yellow / Appearance: Clear / S.010 / pH: x  Gluc: x / Ketone: Negative  / Bili: Negative / Urobili: Negative   Blood: x / Protein: 500 mg/dL / Nitrite: Negative   Leuk Esterase: Negative / RBC: 5-10 /HPF / WBC 0-2 /HPF   Sq Epi: x / Non Sq Epi: Neg.-Few / Bacteria: Negative /HPF      Procalcitonin, Serum: 0.13 ng/mL (19 @ 06:25)    Serum Pro-Brain Natriuretic Peptide: 3492 pg/mL (19 @ 01:00)        CULTURES: (if applicable)    Culture - Urine (collected 19 @ 02:35)  Source: .Urine Clean Catch (Midstream)  Final Report (10-01-19 @ 07:56):    No growth    Culture - Blood (collected 19 @ 01:41)  Source: .Blood Blood-Peripheral  Preliminary Report (10-01-19 @ 10:00):    No growth to date.    Culture - Blood (collected 19 @ 01:41)  Source: .Blood Blood-Peripheral  Preliminary Report (10-01-19 @ 10:00):    No growth to date.      Rapid RVP Result: NotDetec (19 @ 02:39)      ABG - ( 30 Sep 2019 04:38 )  pH, Arterial: 7.41  pH, Blood: x     /  pCO2: 35    /  pO2: 305   / HCO3: x     / Base Excess: x     /  SaO2: >99               CAPILLARY BLOOD GLUCOSE          RADIOLOGY  CXR:      CT:    ECHO:      VITALS:  T(C): 36.6 (10-01-19 @ 09:53), Max: 36.9 (19 @ 22:20)  T(F): 97.8 (10-01-19 @ 09:53), Max: 98.4 (19 @ 22:20)  HR: 68 (10-01-19 @ 09:53) (66 - 77)  BP: 156/66 (10-01-19 @ 09:53) (141/74 - 173/87)  BP(mean): --  ABP: --  ABP(mean): --  RR: 17 (10-01-19 @ 09:53) (17 - 18)  SpO2: 96% (10-01-19 @ 09:53) (96% - 97%)  CVP(mm Hg): --  CVP(cm H2O): --    Ins and Outs     19 @ 07:01  -  10-01-19 @ 07:00  --------------------------------------------------------  IN: 220 mL / OUT: 1185 mL / NET: -965 mL    10-01-19 @ 07:  -  10-01-19 @ 13:48  --------------------------------------------------------  IN: 350 mL / OUT: 500 mL / NET: -150 mL        Height (cm): 167.6 (19 @ 03:38)  Weight (kg): 80.4 (19 @ 03:38)  BMI (kg/m2): 28.6 (19 @ 03:38)        I&O's Detail    30 Sep 2019 07:  -  01 Oct 2019 07:00  --------------------------------------------------------  IN:    Oral Fluid: 220 mL  Total IN: 220 mL    OUT:    Indwelling Catheter - Urethral: 285 mL    Voided: 900 mL  Total OUT: 1185 mL    Total NET: -965 mL      01 Oct 2019 07:  -  01 Oct 2019 13:48  --------------------------------------------------------  IN:    Oral Fluid: 350 mL  Total IN: 350 mL    OUT:    Voided: 500 mL  Total OUT: 500 mL    Total NET: -150 mL

## 2019-10-01 NOTE — PROGRESS NOTE ADULT - SUBJECTIVE AND OBJECTIVE BOX
Patient is a 85y old  Male who presents with a chief complaint of Worsening SOB (01 Oct 2019 13:47)      Interval HPI/ Overnight events: no events overnight    Patient seen and examined at bedside, has no complaints    REVIEW OF SYSTEMS:    CONSTITUTIONAL: No weakness, fevers or chills  EYES/ENT: No visual changes;  No vertigo or throat pain   NECK: No pain or stiffness  RESPIRATORY: No cough, wheezing, hemoptysis; No shortness of breath  CARDIOVASCULAR: No chest pain or palpitations  GASTROINTESTINAL: No abdominal or epigastric pain. No nausea, vomiting, or hematemesis; No diarrhea or constipation. No melena or hematochezia.  GENITOURINARY: No dysuria, frequency or hematuria  NEUROLOGICAL: No numbness or weakness  MSK: no joint pain, no joint swelling  All other review of systems is negative unless indicated above.      ALLERGIES:  No Known Allergies    MEDICATIONS  (STANDING):  apixaban 5 milliGRAM(s) Oral two times a day  aspirin enteric coated 81 milliGRAM(s) Oral daily  chlorhexidine 4% Liquid 1 Application(s) Topical <User Schedule>  furosemide   Injectable 40 milliGRAM(s) IV Push daily  influenza   Vaccine 0.5 milliLiter(s) IntraMuscular once  losartan 100 milliGRAM(s) Oral daily  metoprolol succinate ER 25 milliGRAM(s) Oral daily    MEDICATIONS  (PRN):    Vital Signs Last 24 Hrs  T(F): 97.8 (01 Oct 2019 16:04), Max: 98.4 (30 Sep 2019 22:20)  HR: 75 (01 Oct 2019 16:04) (66 - 98)  BP: 152/77 (01 Oct 2019 16:04) (152/77 - 173/87)  RR: 17 (01 Oct 2019 16:04) (16 - 18)  SpO2: 97% (01 Oct 2019 16:04) (96% - 97%)  I&O's Summary    30 Sep 2019 07:  -  01 Oct 2019 07:00  --------------------------------------------------------  IN: 220 mL / OUT: 1185 mL / NET: -965 mL    01 Oct 2019 07:01  -  01 Oct 2019 17:40  --------------------------------------------------------  IN: 600 mL / OUT: 900 mL / NET: -300 mL        PHYSICAL EXAM:  General: NAD, well dressed, well nourished  Head: NT/AC  ENT: MMM, no oropharyngeal erythema or exudates  Neck: Supple, No JVD  Lungs: Crackles bilaterally, L>R, no wheezing, rales, or rhonchi, no accessory muscle use  Cardio: RRR, normal S1/S2, No M/R/G  Abdomen: Normoactive BS, Abdomen soft, nontender, nondistended; no organomegaly  Extremities: No calf tenderness, no clubbing or  cyanosis  Vascular: no LE edema  Lymph: no cervical LAD  Skin: warm and dry  Neuro: AAOx3, answers all questions appropriately, moves all extremities    LABS:                        13.7   12.85 )-----------( 159      ( 30 Sep 2019 06:25 )             41.6         146  |  108  |  27  ----------------------------<  112  4.0   |  22  |  1.13    Ca    8.6      30 Sep 2019 06:25  Phos  4.0       Mg     1.9         TPro  8.4  /  Alb  3.8  /  TBili  0.7  /  DBili  x   /  AST  43  /  ALT  25  /  AlkPhos  68      eGFR if Non African American: 59 mL/min/1.73M2 (19 @ 06:25)  eGFR if : 69 mL/min/1.73M2 (19 @ 06:25)    PT/INR - ( 30 Sep 2019 01:00 )   PT: 11.8 sec;   INR: 1.05 ratio         PTT - ( 30 Sep 2019 01:00 )  PTT:23.6 sec  Lactate, Blood: 1.1 mmol/L ( @ 06:25)  Lactate, Blood: 2.8 mmol/L ( @ 01:41)    CARDIAC MARKERS ( 30 Sep 2019 21:45 )  .710 ng/mL / x     / x     / x     / x      CARDIAC MARKERS ( 30 Sep 2019 12:23 )  .925 ng/mL / x     / x     / x     / x      CARDIAC MARKERS ( 30 Sep 2019 06:25 )  .626 ng/mL / x     / x     / x     / x      CARDIAC MARKERS ( 30 Sep 2019 01:00 )  .361 ng/mL / x     / x     / x     / x                ABG - ( 30 Sep 2019 04:38 )  pH, Arterial: 7.41  pH, Blood: x     /  pCO2: 35    /  pO2: 305   / HCO3: x     / Base Excess: x     /  SaO2: >99                         Urinalysis Basic - ( 30 Sep 2019 02:35 )    Color: Yellow / Appearance: Clear / S.010 / pH: x  Gluc: x / Ketone: Negative  / Bili: Negative / Urobili: Negative   Blood: x / Protein: 500 mg/dL / Nitrite: Negative   Leuk Esterase: Negative / RBC: 5-10 /HPF / WBC 0-2 /HPF   Sq Epi: x / Non Sq Epi: Neg.-Few / Bacteria: Negative /HPF        Culture - Urine (collected 30 Sep 2019 02:35)  Source: .Urine Clean Catch (Midstream)  Final Report (01 Oct 2019 07:56):    No growth    Culture - Blood (collected 30 Sep 2019 01:41)  Source: .Blood Blood-Peripheral  Preliminary Report (01 Oct 2019 10:00):    No growth to date.    Culture - Blood (collected 30 Sep 2019 01:41)  Source: .Blood Blood-Peripheral  Preliminary Report (01 Oct 2019 10:00):    No growth to date.        RADIOLOGY & ADDITIONAL TESTS:    < from: TTE Echo Complete w/Doppler (19 @ 07:40) >  Summary:   1. Left ventricular ejection fraction, by visual estimation, is 25 to   30%.   2. Moderately to severely decreased global left ventricular systolic   function.   3. Moderately increased LV wall thickness.   4. Spectral Doppler shows restrictive pattern of left ventricular   myocardial filling (Grade III diastolic dysfunction).   5. There is moderate concentric left ventricular hypertrophy.   6. Mild mitralannular calcification.   7. Moderate thickening and calcification of the anterior and posterior   mitral valve leaflets.   8. Moderately decreased mitral leaflet mobility.   9. Mild aortic regurgitation.  10. Mild to moderate pulmonic valve regurgitation.  11. Estimated pulmonary artery systolic pressure is 45.3 mmHg assuming a   right atrial pressure of 10 mmHg, which is consistent with mild pulmonary   hypertension.  12. LA volume Index is 62.2 ml/m² ml/m2.    < end of copied text >      Care Discussed with Consultants/Other Providers:

## 2019-10-02 LAB
ANION GAP SERPL CALC-SCNC: 10 MMOL/L — SIGNIFICANT CHANGE UP (ref 5–17)
BUN SERPL-MCNC: 28 MG/DL — HIGH (ref 7–23)
CALCIUM SERPL-MCNC: 8.7 MG/DL — SIGNIFICANT CHANGE UP (ref 8.4–10.5)
CHLORIDE SERPL-SCNC: 105 MMOL/L — SIGNIFICANT CHANGE UP (ref 96–108)
CO2 SERPL-SCNC: 29 MMOL/L — SIGNIFICANT CHANGE UP (ref 22–31)
CREAT SERPL-MCNC: 1.15 MG/DL — SIGNIFICANT CHANGE UP (ref 0.5–1.3)
GLUCOSE SERPL-MCNC: 109 MG/DL — HIGH (ref 70–99)
HCT VFR BLD CALC: 39.4 % — SIGNIFICANT CHANGE UP (ref 39–50)
HGB BLD-MCNC: 13.1 G/DL — SIGNIFICANT CHANGE UP (ref 13–17)
MAGNESIUM SERPL-MCNC: 2 MG/DL — SIGNIFICANT CHANGE UP (ref 1.6–2.6)
MCHC RBC-ENTMCNC: 32.4 PG — SIGNIFICANT CHANGE UP (ref 27–34)
MCHC RBC-ENTMCNC: 33.2 GM/DL — SIGNIFICANT CHANGE UP (ref 32–36)
MCV RBC AUTO: 97.5 FL — SIGNIFICANT CHANGE UP (ref 80–100)
NRBC # BLD: 0 /100 WBCS — SIGNIFICANT CHANGE UP (ref 0–0)
PLATELET # BLD AUTO: 149 K/UL — LOW (ref 150–400)
POTASSIUM SERPL-MCNC: 3.2 MMOL/L — LOW (ref 3.5–5.3)
POTASSIUM SERPL-SCNC: 3.2 MMOL/L — LOW (ref 3.5–5.3)
RBC # BLD: 4.04 M/UL — LOW (ref 4.2–5.8)
RBC # FLD: 14.6 % — HIGH (ref 10.3–14.5)
SODIUM SERPL-SCNC: 144 MMOL/L — SIGNIFICANT CHANGE UP (ref 135–145)
WBC # BLD: 9.6 K/UL — SIGNIFICANT CHANGE UP (ref 3.8–10.5)
WBC # FLD AUTO: 9.6 K/UL — SIGNIFICANT CHANGE UP (ref 3.8–10.5)

## 2019-10-02 PROCEDURE — 99233 SBSQ HOSP IP/OBS HIGH 50: CPT

## 2019-10-02 RX ORDER — POTASSIUM CHLORIDE 20 MEQ
40 PACKET (EA) ORAL EVERY 4 HOURS
Refills: 0 | Status: COMPLETED | OUTPATIENT
Start: 2019-10-02 | End: 2019-10-02

## 2019-10-02 RX ORDER — POLYETHYLENE GLYCOL 3350 17 G/17G
17 POWDER, FOR SOLUTION ORAL DAILY
Refills: 0 | Status: DISCONTINUED | OUTPATIENT
Start: 2019-10-02 | End: 2019-10-03

## 2019-10-02 RX ORDER — REGADENOSON 0.08 MG/ML
0.4 INJECTION, SOLUTION INTRAVENOUS ONCE
Refills: 0 | Status: DISCONTINUED | OUTPATIENT
Start: 2019-10-02 | End: 2019-10-03

## 2019-10-02 RX ORDER — DOCUSATE SODIUM 100 MG
100 CAPSULE ORAL
Refills: 0 | Status: DISCONTINUED | OUTPATIENT
Start: 2019-10-02 | End: 2019-10-03

## 2019-10-02 RX ORDER — AMLODIPINE BESYLATE 2.5 MG/1
5 TABLET ORAL DAILY
Refills: 0 | Status: DISCONTINUED | OUTPATIENT
Start: 2019-10-02 | End: 2019-10-03

## 2019-10-02 RX ORDER — SIMETHICONE 80 MG/1
80 TABLET, CHEWABLE ORAL DAILY
Refills: 0 | Status: DISCONTINUED | OUTPATIENT
Start: 2019-10-02 | End: 2019-10-03

## 2019-10-02 RX ORDER — FUROSEMIDE 40 MG
40 TABLET ORAL DAILY
Refills: 0 | Status: DISCONTINUED | OUTPATIENT
Start: 2019-10-02 | End: 2019-10-03

## 2019-10-02 RX ORDER — METOPROLOL TARTRATE 50 MG
50 TABLET ORAL DAILY
Refills: 0 | Status: DISCONTINUED | OUTPATIENT
Start: 2019-10-02 | End: 2019-10-03

## 2019-10-02 RX ADMIN — Medication 81 MILLIGRAM(S): at 11:16

## 2019-10-02 RX ADMIN — Medication 40 MILLIEQUIVALENT(S): at 11:15

## 2019-10-02 RX ADMIN — Medication 100 MILLIGRAM(S): at 17:26

## 2019-10-02 RX ADMIN — APIXABAN 5 MILLIGRAM(S): 2.5 TABLET, FILM COATED ORAL at 05:25

## 2019-10-02 RX ADMIN — Medication 40 MILLIEQUIVALENT(S): at 15:50

## 2019-10-02 RX ADMIN — SIMETHICONE 80 MILLIGRAM(S): 80 TABLET, CHEWABLE ORAL at 11:15

## 2019-10-02 RX ADMIN — AMLODIPINE BESYLATE 5 MILLIGRAM(S): 2.5 TABLET ORAL at 17:26

## 2019-10-02 RX ADMIN — Medication 40 MILLIGRAM(S): at 05:25

## 2019-10-02 RX ADMIN — LOSARTAN POTASSIUM 100 MILLIGRAM(S): 100 TABLET, FILM COATED ORAL at 05:25

## 2019-10-02 RX ADMIN — APIXABAN 5 MILLIGRAM(S): 2.5 TABLET, FILM COATED ORAL at 17:26

## 2019-10-02 RX ADMIN — POLYETHYLENE GLYCOL 3350 17 GRAM(S): 17 POWDER, FOR SOLUTION ORAL at 11:15

## 2019-10-02 NOTE — PROGRESS NOTE ADULT - SUBJECTIVE AND OBJECTIVE BOX
SUBJ:  Patient is a 85y old  Male who presents with a chief complaint of Worsening SOB (02 Oct 2019 11:54)  patient seen and examined  chart is reviewed  case discussed with Dr. Hein  patient now on telemetry... appears comfortable      PAST MEDICAL & SURGICAL HISTORY:  Hypertension, unspecified type  CVA (cerebral vascular accident)  Congestive heart failure (CHF)  Afib  No significant past surgical history      MEDICATIONS  (STANDING):  apixaban 5 milliGRAM(s) Oral two times a day  aspirin enteric coated 81 milliGRAM(s) Oral daily  chlorhexidine 4% Liquid 1 Application(s) Topical <User Schedule>  docusate sodium 100 milliGRAM(s) Oral two times a day  furosemide   Injectable 40 milliGRAM(s) IV Push daily  influenza   Vaccine 0.5 milliLiter(s) IntraMuscular once  losartan 100 milliGRAM(s) Oral daily  metoprolol succinate ER 25 milliGRAM(s) Oral daily  polyethylene glycol 3350 17 Gram(s) Oral daily  potassium chloride    Tablet ER 40 milliEquivalent(s) Oral every 4 hours  simethicone 80 milliGRAM(s) Chew daily    MEDICATIONS  (PRN):          Vital Signs Last 24 Hrs  T(C): 37.1 (02 Oct 2019 10:06), Max: 37.1 (01 Oct 2019 20:53)  T(F): 98.8 (02 Oct 2019 10:06), Max: 98.8 (01 Oct 2019 20:53)  HR: 88 (02 Oct 2019 10:06) (58 - 98)  BP: 165/69 (02 Oct 2019 10:06) (149/73 - 165/69)  BP(mean): --  RR: 15 (02 Oct 2019 10:06) (15 - 17)  SpO2: 95% (02 Oct 2019 10:06) (95% - 99%)    REVIEW OF SYSTEMS:  CONSTITUTIONAL: weak and fatigued   RESPIRATORY: No cough, wheezing, chills or hemoptysis;   CARDIOVASCULAR: No chest pain or chest pressure.  No dizziness, light headedness, syncope or near syncope.  No edema, no orthopnea.   NEUROLOGICAL: No headaches, memory loss, loss of strength, numbness, or tremors      PHYSICAL EXAM  Constitutional:  WDWN. No acute distress  HEENT: normocephalic, atraumatic.  PERRLA. EOMI  Neck : No JVD. no carotid bruits  Lungs:  clear to auscultation bilaterally. no rhonchi. no wheezing  Heart:  irregular S1 and S2. No S3, S4. II/VI systolic murmur.  Abdomen:  soft, non tender.  Extremities: No clubbing, cyanoisis or edema  Nuerologic:  A+O x 3. No focal deficits  Skin:  no rashes        LABS:                        13.1   9.60  )-----------( 149      ( 02 Oct 2019 06:50 )             39.4     10-02    144  |  105  |  28<H>  ----------------------------<  109<H>  3.2<L>   |  29  |  1.15    Ca    8.7      02 Oct 2019 06:50  Mg     2.0     10-02      CARDIAC MARKERS ( 30 Sep 2019 21:45 )  .710 ng/mL / x     / x     / x     / x          CARDIAC MARKERS ( 30 Sep 2019 21:45 )  .710 ng/mL / x     / x     / x     / x          apixaban 5 milliGRAM(s) Oral two times a day  aspirin enteric coated 81 milliGRAM(s) Oral daily  chlorhexidine 4% Liquid 1 Application(s) Topical <User Schedule>  docusate sodium 100 milliGRAM(s) Oral two times a day  furosemide   Injectable 40 milliGRAM(s) IV Push daily  influenza   Vaccine 0.5 milliLiter(s) IntraMuscular once  losartan 100 milliGRAM(s) Oral daily  metoprolol succinate ER 25 milliGRAM(s) Oral daily  polyethylene glycol 3350 17 Gram(s) Oral daily  potassium chloride    Tablet ER 40 milliEquivalent(s) Oral every 4 hours  simethicone 80 milliGRAM(s) Chew daily    I&O's Summary    01 Oct 2019 07:01  -  02 Oct 2019 07:00  --------------------------------------------------------  IN: 600 mL / OUT: 1300 mL / NET: -700 mL    tele AF  < from: 12 Lead ECG (09.30.19 @ 18:14) >  Atrial fibrillation  ST and T wave abnormality, consider anterolateral ischemia  Prolonged QT  Abnormal ECG  When compared with ECG of 30-SEP-2019 01:09,  Vent. rate has decreased BY  57 BPM  T wave inversion now evident in Anterior leads    < end of copied text >    < from: TTE Echo Complete w/Doppler (09.30.19 @ 07:40) >   1. Left ventricular ejection fraction, by visual estimation, is 25 to   30%.   2. Moderately to severely decreased global left ventricular systolic   function.   3. Moderately increased LV wall thickness.   4. Spectral Doppler shows restrictive pattern of left ventricular   myocardial filling (Grade III diastolic dysfunction).   5. There is moderate concentric left ventricular hypertrophy.   6. Mild mitralannular calcification.   7. Moderate thickening and calcification of the anterior and posterior   mitral valve leaflets.   8. Moderately decreased mitral leaflet mobility.   9. Mild aortic regurgitation.  10. Mild to moderate pulmonic valve regurgitation.  11. Estimated pulmonary artery systolic pressure is 45.3 mmHg assuming a   right atrial pressure of 10 mmHg, which is consistent with mild pulmonary   hypertension.  12. LA volume Index is 62.2 ml/m² ml/m2.    < end of copied text >

## 2019-10-02 NOTE — PROGRESS NOTE ADULT - SUBJECTIVE AND OBJECTIVE BOX
Patient is a 85y old  Male who presents with a chief complaint of Worsening SOB (02 Oct 2019 13:54)      Interval HPI/ Overnight events: no events overnight    Patient seen and examined at bedside, no complaints    REVIEW OF SYSTEMS:    CONSTITUTIONAL: No weakness, fevers or chills  EYES/ENT: No visual changes;  No vertigo or throat pain   NECK: No pain or stiffness  RESPIRATORY: No cough, wheezing, hemoptysis; No shortness of breath  CARDIOVASCULAR: No chest pain or palpitations  GASTROINTESTINAL: No abdominal or epigastric pain. No nausea, vomiting, or hematemesis; No diarrhea or constipation. No melena or hematochezia.  GENITOURINARY: No dysuria, frequency or hematuria  NEUROLOGICAL: No numbness or weakness  SKIN: No itching, burning, rashes, or lesions   MSK: no joint pain, no joint swelling  All other review of systems is negative unless indicated above.      ALLERGIES:  No Known Allergies    MEDICATIONS  (STANDING):  apixaban 5 milliGRAM(s) Oral two times a day  aspirin enteric coated 81 milliGRAM(s) Oral daily  chlorhexidine 4% Liquid 1 Application(s) Topical <User Schedule>  docusate sodium 100 milliGRAM(s) Oral two times a day  furosemide   Injectable 40 milliGRAM(s) IV Push daily  influenza   Vaccine 0.5 milliLiter(s) IntraMuscular once  losartan 100 milliGRAM(s) Oral daily  metoprolol succinate ER 25 milliGRAM(s) Oral daily  polyethylene glycol 3350 17 Gram(s) Oral daily  potassium chloride    Tablet ER 40 milliEquivalent(s) Oral every 4 hours  simethicone 80 milliGRAM(s) Chew daily    MEDICATIONS  (PRN):    Vital Signs Last 24 Hrs  T(F): 98.8 (02 Oct 2019 10:06), Max: 98.8 (01 Oct 2019 20:53)  HR: 88 (02 Oct 2019 10:06) (58 - 98)  BP: 165/69 (02 Oct 2019 10:06) (149/73 - 165/69)  RR: 15 (02 Oct 2019 10:06) (15 - 17)  SpO2: 95% (02 Oct 2019 10:06) (95% - 99%)  I&O's Summary    01 Oct 2019 07:01  -  02 Oct 2019 07:00  --------------------------------------------------------  IN: 600 mL / OUT: 1300 mL / NET: -700 mL        PHYSICAL EXAM:  General: NAD, well dressed, well nourished  Head: NT/AC  ENT: MMM, no oropharyngeal erythema or exudates  Neck: Supple, No JVD  Lungs: Clear to auscultation bilaterally, no wheezing, rales, or rhonchi, no accessory muscle use  Cardio: RRR, normal S1/S2, No M/R/G  Abdomen: Normoactive BS, Abdomen soft, nontender, nondistended; no organomegaly  Extremities: No calf tenderness, no clubbing or  cyanosis  Vascular: no LE edema  Lymph: no cervical LAD  Skin: warm and dry  Neuro: AAOx3, answers all questions appropriately, moves all extremities    LABS:                        13.1   9.60  )-----------( 149      ( 02 Oct 2019 06:50 )             39.4     10-02    144  |  105  |  28  ----------------------------<  109  3.2   |  29  |  1.15    Ca    8.7      02 Oct 2019 06:50  Phos  4.0       Mg     2.0     10-02    TPro  8.4  /  Alb  3.8  /  TBili  0.7  /  DBili  x   /  AST  43  /  ALT  25  /  AlkPhos  68      eGFR if Non African American: 58 mL/min/1.73M2 (10-02-19 @ 06:50)  eGFR if : 67 mL/min/1.73M2 (10-02-19 @ 06:50)    PT/INR - ( 30 Sep 2019 01:00 )   PT: 11.8 sec;   INR: 1.05 ratio         PTT - ( 30 Sep 2019 01:00 )  PTT:23.6 sec  Lactate, Blood: 1.1 mmol/L ( @ 06:25)  Lactate, Blood: 2.8 mmol/L ( @ 01:41)    CARDIAC MARKERS ( 30 Sep 2019 21:45 )  .710 ng/mL / x     / x     / x     / x      CARDIAC MARKERS ( 30 Sep 2019 12:23 )  .925 ng/mL / x     / x     / x     / x      CARDIAC MARKERS ( 30 Sep 2019 06:25 )  .626 ng/mL / x     / x     / x     / x      CARDIAC MARKERS ( 30 Sep 2019 01:00 )  .361 ng/mL / x     / x     / x     / x                ABG - ( 30 Sep 2019 04:38 )  pH, Arterial: 7.41  pH, Blood: x     /  pCO2: 35    /  pO2: 305   / HCO3: x     / Base Excess: x     /  SaO2: >99                         Urinalysis Basic - ( 30 Sep 2019 02:35 )    Color: Yellow / Appearance: Clear / S.010 / pH: x  Gluc: x / Ketone: Negative  / Bili: Negative / Urobili: Negative   Blood: x / Protein: 500 mg/dL / Nitrite: Negative   Leuk Esterase: Negative / RBC: 5-10 /HPF / WBC 0-2 /HPF   Sq Epi: x / Non Sq Epi: Neg.-Few / Bacteria: Negative /HPF        Culture - Urine (collected 30 Sep 2019 02:35)  Source: .Urine Clean Catch (Midstream)  Final Report (01 Oct 2019 07:56):    No growth    Culture - Blood (collected 30 Sep 2019 01:41)  Source: .Blood Blood-Peripheral  Preliminary Report (01 Oct 2019 10:00):    No growth to date.    Culture - Blood (collected 30 Sep 2019 01:41)  Source: .Blood Blood-Peripheral  Preliminary Report (01 Oct 2019 10:00):    No growth to date.        RADIOLOGY & ADDITIONAL TESTS:    Care Discussed with Consultants/Other Providers: Patient is a 85y old  Male who presents with a chief complaint of Worsening SOB (02 Oct 2019 13:54)      Interval HPI/ Overnight events: no events overnight    Patient seen and examined at bedside, no complaints    REVIEW OF SYSTEMS:    CONSTITUTIONAL: No weakness, fevers or chills  EYES/ENT: No visual changes;  No vertigo or throat pain   NECK: No pain or stiffness  RESPIRATORY: No cough, wheezing, hemoptysis; No shortness of breath  CARDIOVASCULAR: No chest pain or palpitations  GASTROINTESTINAL: No abdominal or epigastric pain. No nausea, vomiting, or hematemesis; No diarrhea or constipation. No melena or hematochezia.  GENITOURINARY: No dysuria, frequency or hematuria  NEUROLOGICAL: No numbness or weakness  SKIN: No itching, burning, rashes, or lesions   MSK: no joint pain, no joint swelling  All other review of systems is negative unless indicated above.      ALLERGIES:  No Known Allergies    MEDICATIONS  (STANDING):  apixaban 5 milliGRAM(s) Oral two times a day  aspirin enteric coated 81 milliGRAM(s) Oral daily  chlorhexidine 4% Liquid 1 Application(s) Topical <User Schedule>  docusate sodium 100 milliGRAM(s) Oral two times a day  furosemide   Injectable 40 milliGRAM(s) IV Push daily  influenza   Vaccine 0.5 milliLiter(s) IntraMuscular once  losartan 100 milliGRAM(s) Oral daily  metoprolol succinate ER 25 milliGRAM(s) Oral daily  polyethylene glycol 3350 17 Gram(s) Oral daily  potassium chloride    Tablet ER 40 milliEquivalent(s) Oral every 4 hours  simethicone 80 milliGRAM(s) Chew daily    MEDICATIONS  (PRN):    Vital Signs Last 24 Hrs  T(F): 98.8 (02 Oct 2019 10:06), Max: 98.8 (01 Oct 2019 20:53)  HR: 88 (02 Oct 2019 10:06) (58 - 98)  BP: 165/69 (02 Oct 2019 10:06) (149/73 - 165/69)  RR: 15 (02 Oct 2019 10:06) (15 - 17)  SpO2: 95% (02 Oct 2019 10:06) (95% - 99%)  I&O's Summary    01 Oct 2019 07:01  -  02 Oct 2019 07:00  --------------------------------------------------------  IN: 600 mL / OUT: 1300 mL / NET: -700 mL          PHYSICAL EXAM:  General: NAD, well dressed, well nourished  Head: NT/AC  ENT: MMM, no oropharyngeal erythema or exudates  Neck: Supple, No JVD  Lungs: mild basilar crackles b/l (improved from yesterday), no wheezing or rhonchi, no accessory muscle use  Cardio: RRR, normal S1/S2, No M/R/G  Abdomen: Normoactive BS, Abdomen soft, nontender, nondistended; no organomegaly  Extremities: No calf tenderness, no clubbing or  cyanosis  Vascular: no LE edema  Lymph: no cervical LAD  Skin: warm and dry  Neuro: AAOx3, answers all questions appropriately, moves all extremities    LABS:                        13.1   9.60  )-----------( 149      ( 02 Oct 2019 06:50 )             39.4     10-02    144  |  105  |  28  ----------------------------<  109  3.2   |  29  |  1.15    Ca    8.7      02 Oct 2019 06:50  Phos  4.0       Mg     2.0     10-02    TPro  8.4  /  Alb  3.8  /  TBili  0.7  /  DBili  x   /  AST  43  /  ALT  25  /  AlkPhos  68      eGFR if Non African American: 58 mL/min/1.73M2 (10-02-19 @ 06:50)  eGFR if : 67 mL/min/1.73M2 (10-02-19 @ 06:50)    PT/INR - ( 30 Sep 2019 01:00 )   PT: 11.8 sec;   INR: 1.05 ratio         PTT - ( 30 Sep 2019 01:00 )  PTT:23.6 sec  Lactate, Blood: 1.1 mmol/L ( @ 06:25)  Lactate, Blood: 2.8 mmol/L ( @ 01:41)    CARDIAC MARKERS ( 30 Sep 2019 21:45 )  .710 ng/mL / x     / x     / x     / x      CARDIAC MARKERS ( 30 Sep 2019 12:23 )  .925 ng/mL / x     / x     / x     / x      CARDIAC MARKERS ( 30 Sep 2019 06:25 )  .626 ng/mL / x     / x     / x     / x      CARDIAC MARKERS ( 30 Sep 2019 01:00 )  .361 ng/mL / x     / x     / x     / x                ABG - ( 30 Sep 2019 04:38 )  pH, Arterial: 7.41  pH, Blood: x     /  pCO2: 35    /  pO2: 305   / HCO3: x     / Base Excess: x     /  SaO2: >99                         Urinalysis Basic - ( 30 Sep 2019 02:35 )    Color: Yellow / Appearance: Clear / S.010 / pH: x  Gluc: x / Ketone: Negative  / Bili: Negative / Urobili: Negative   Blood: x / Protein: 500 mg/dL / Nitrite: Negative   Leuk Esterase: Negative / RBC: 5-10 /HPF / WBC 0-2 /HPF   Sq Epi: x / Non Sq Epi: Neg.-Few / Bacteria: Negative /HPF        Culture - Urine (collected 30 Sep 2019 02:35)  Source: .Urine Clean Catch (Midstream)  Final Report (01 Oct 2019 07:56):    No growth    Culture - Blood (collected 30 Sep 2019 01:41)  Source: .Blood Blood-Peripheral  Preliminary Report (01 Oct 2019 10:00):    No growth to date.    Culture - Blood (collected 30 Sep 2019 01:41)  Source: .Blood Blood-Peripheral  Preliminary Report (01 Oct 2019 10:00):    No growth to date.        RADIOLOGY & ADDITIONAL TESTS:    Care Discussed with Consultants/Other Providers:

## 2019-10-02 NOTE — PROGRESS NOTE ADULT - SUBJECTIVE AND OBJECTIVE BOX
Follow-up Critical Care Progress Note  Chief Complaint : Heart failure    pt seen and examined comfortable  no cp, sob, palp, n/v  states much better since hospitalization       Allergies :No Known Allergies      PAST MEDICAL & SURGICAL HISTORY:  Hypertension, unspecified type  CVA (cerebral vascular accident)  Congestive heart failure (CHF)  Afib  No significant past surgical history      Medications:  MEDICATIONS  (STANDING):  apixaban 5 milliGRAM(s) Oral two times a day  aspirin enteric coated 81 milliGRAM(s) Oral daily  chlorhexidine 4% Liquid 1 Application(s) Topical <User Schedule>  docusate sodium 100 milliGRAM(s) Oral two times a day  furosemide   Injectable 40 milliGRAM(s) IV Push daily  influenza   Vaccine 0.5 milliLiter(s) IntraMuscular once  losartan 100 milliGRAM(s) Oral daily  metoprolol succinate ER 25 milliGRAM(s) Oral daily  polyethylene glycol 3350 17 Gram(s) Oral daily  potassium chloride    Tablet ER 40 milliEquivalent(s) Oral every 4 hours  simethicone 80 milliGRAM(s) Chew daily    MEDICATIONS  (PRN):      LABS:                        13.1   9.60  )-----------( 149      ( 02 Oct 2019 06:50 )             39.4     10-02    144  |  105  |  28<H>  ----------------------------<  109<H>  3.2<L>   |  29  |  1.15    Ca    8.7      02 Oct 2019 06:50  Mg     2.0     10-02        CARDIAC MARKERS ( 30 Sep 2019 21:45 )  .710 ng/mL / x     / x     / x     / x      CARDIAC MARKERS ( 30 Sep 2019 12:23 )  .925 ng/mL / x     / x     / x     / x                Procalcitonin, Serum: 0.13 ng/mL (09-30-19 @ 06:25)    Serum Pro-Brain Natriuretic Peptide: 3492 pg/mL (09-30-19 @ 01:00)        CULTURES: (if applicable)    Culture - Urine (collected 09-30-19 @ 02:35)  Source: .Urine Clean Catch (Midstream)  Final Report (10-01-19 @ 07:56):    No growth    Culture - Blood (collected 09-30-19 @ 01:41)  Source: .Blood Blood-Peripheral  Preliminary Report (10-01-19 @ 10:00):    No growth to date.    Culture - Blood (collected 09-30-19 @ 01:41)  Source: .Blood Blood-Peripheral  Preliminary Report (10-01-19 @ 10:00):    No growth to date.      Rapid RVP Result: NotDetec (09-30-19 @ 02:39)        CAPILLARY BLOOD GLUCOSE          RADIOLOGY  CXR:    < from: Xray Chest 1 View AP/PA (09.30.19 @ 01:25) >    Findings consistent with moderate CHF or less likely bilateral pneumonia    < end of copied text >      VITALS:  T(C): 37.1 (10-02-19 @ 10:06), Max: 37.1 (10-01-19 @ 20:53)  T(F): 98.8 (10-02-19 @ 10:06), Max: 98.8 (10-01-19 @ 20:53)  HR: 88 (10-02-19 @ 10:06) (58 - 98)  BP: 165/69 (10-02-19 @ 10:06) (149/73 - 165/69)  BP(mean): --  ABP: --  ABP(mean): --  RR: 15 (10-02-19 @ 10:06) (15 - 17)  SpO2: 95% (10-02-19 @ 10:06) (95% - 99%)  CVP(mm Hg): --  CVP(cm H2O): --    Ins and Outs     10-01-19 @ 07:01  -  10-02-19 @ 07:00  --------------------------------------------------------  IN: 600 mL / OUT: 1300 mL / NET: -700 mL        Height (cm): 167.6 (09-30-19 @ 03:38)  Weight (kg): 80.4 (09-30-19 @ 03:38)  BMI (kg/m2): 28.6 (09-30-19 @ 03:38)        I&O's Detail    01 Oct 2019 07:01  -  02 Oct 2019 07:00  --------------------------------------------------------  IN:    Oral Fluid: 600 mL  Total IN: 600 mL    OUT:    Voided: 1300 mL  Total OUT: 1300 mL    Total NET: -700 mL

## 2019-10-02 NOTE — PHYSICAL THERAPY INITIAL EVALUATION ADULT - ADDITIONAL COMMENTS
pt lives in private house w/3 kids, 2 kev w/rail, no stairs in house. pt uses straight cane to ambulate, independent w/ADL's but does not drive.

## 2019-10-03 ENCOUNTER — TRANSCRIPTION ENCOUNTER (OUTPATIENT)
Age: 84
End: 2019-10-03

## 2019-10-03 VITALS
SYSTOLIC BLOOD PRESSURE: 185 MMHG | DIASTOLIC BLOOD PRESSURE: 80 MMHG | RESPIRATION RATE: 16 BRPM | OXYGEN SATURATION: 96 % | HEART RATE: 62 BPM | TEMPERATURE: 97 F

## 2019-10-03 LAB
ANION GAP SERPL CALC-SCNC: 9 MMOL/L — SIGNIFICANT CHANGE UP (ref 5–17)
BUN SERPL-MCNC: 31 MG/DL — HIGH (ref 7–23)
CALCIUM SERPL-MCNC: 8.6 MG/DL — SIGNIFICANT CHANGE UP (ref 8.4–10.5)
CHLORIDE SERPL-SCNC: 105 MMOL/L — SIGNIFICANT CHANGE UP (ref 96–108)
CO2 SERPL-SCNC: 28 MMOL/L — SIGNIFICANT CHANGE UP (ref 22–31)
CREAT SERPL-MCNC: 1.13 MG/DL — SIGNIFICANT CHANGE UP (ref 0.5–1.3)
GLUCOSE SERPL-MCNC: 99 MG/DL — SIGNIFICANT CHANGE UP (ref 70–99)
MAGNESIUM SERPL-MCNC: 2 MG/DL — SIGNIFICANT CHANGE UP (ref 1.6–2.6)
POTASSIUM SERPL-MCNC: 3.9 MMOL/L — SIGNIFICANT CHANGE UP (ref 3.5–5.3)
POTASSIUM SERPL-SCNC: 3.9 MMOL/L — SIGNIFICANT CHANGE UP (ref 3.5–5.3)
SODIUM SERPL-SCNC: 142 MMOL/L — SIGNIFICANT CHANGE UP (ref 135–145)

## 2019-10-03 PROCEDURE — 99291 CRITICAL CARE FIRST HOUR: CPT | Mod: 25

## 2019-10-03 PROCEDURE — 87486 CHLMYD PNEUM DNA AMP PROBE: CPT

## 2019-10-03 PROCEDURE — 99239 HOSP IP/OBS DSCHRG MGMT >30: CPT

## 2019-10-03 PROCEDURE — 36600 WITHDRAWAL OF ARTERIAL BLOOD: CPT

## 2019-10-03 PROCEDURE — 87798 DETECT AGENT NOS DNA AMP: CPT

## 2019-10-03 PROCEDURE — 97162 PT EVAL MOD COMPLEX 30 MIN: CPT

## 2019-10-03 PROCEDURE — 87631 RESP VIRUS 3-5 TARGETS: CPT

## 2019-10-03 PROCEDURE — 82803 BLOOD GASES ANY COMBINATION: CPT

## 2019-10-03 PROCEDURE — 83880 ASSAY OF NATRIURETIC PEPTIDE: CPT

## 2019-10-03 PROCEDURE — 85730 THROMBOPLASTIN TIME PARTIAL: CPT

## 2019-10-03 PROCEDURE — 93306 TTE W/DOPPLER COMPLETE: CPT

## 2019-10-03 PROCEDURE — 78452 HT MUSCLE IMAGE SPECT MULT: CPT

## 2019-10-03 PROCEDURE — 81001 URINALYSIS AUTO W/SCOPE: CPT

## 2019-10-03 PROCEDURE — 83735 ASSAY OF MAGNESIUM: CPT

## 2019-10-03 PROCEDURE — 85610 PROTHROMBIN TIME: CPT

## 2019-10-03 PROCEDURE — 36415 COLL VENOUS BLD VENIPUNCTURE: CPT

## 2019-10-03 PROCEDURE — 96375 TX/PRO/DX INJ NEW DRUG ADDON: CPT

## 2019-10-03 PROCEDURE — 87086 URINE CULTURE/COLONY COUNT: CPT

## 2019-10-03 PROCEDURE — 87581 M.PNEUMON DNA AMP PROBE: CPT

## 2019-10-03 PROCEDURE — 94660 CPAP INITIATION&MGMT: CPT

## 2019-10-03 PROCEDURE — 84484 ASSAY OF TROPONIN QUANT: CPT

## 2019-10-03 PROCEDURE — 80053 COMPREHEN METABOLIC PANEL: CPT

## 2019-10-03 PROCEDURE — 93005 ELECTROCARDIOGRAM TRACING: CPT

## 2019-10-03 PROCEDURE — 85027 COMPLETE CBC AUTOMATED: CPT

## 2019-10-03 PROCEDURE — 80048 BASIC METABOLIC PNL TOTAL CA: CPT

## 2019-10-03 PROCEDURE — 87040 BLOOD CULTURE FOR BACTERIA: CPT

## 2019-10-03 PROCEDURE — 87633 RESP VIRUS 12-25 TARGETS: CPT

## 2019-10-03 PROCEDURE — 83605 ASSAY OF LACTIC ACID: CPT

## 2019-10-03 PROCEDURE — 96365 THER/PROPH/DIAG IV INF INIT: CPT

## 2019-10-03 PROCEDURE — 84145 PROCALCITONIN (PCT): CPT

## 2019-10-03 PROCEDURE — 71045 X-RAY EXAM CHEST 1 VIEW: CPT

## 2019-10-03 PROCEDURE — 84100 ASSAY OF PHOSPHORUS: CPT

## 2019-10-03 RX ORDER — APIXABAN 2.5 MG/1
1 TABLET, FILM COATED ORAL
Qty: 60 | Refills: 0
Start: 2019-10-03 | End: 2019-11-01

## 2019-10-03 RX ORDER — METOPROLOL TARTRATE 50 MG
1 TABLET ORAL
Qty: 30 | Refills: 0
Start: 2019-10-03 | End: 2019-11-01

## 2019-10-03 RX ORDER — AMLODIPINE BESYLATE 2.5 MG/1
1 TABLET ORAL
Qty: 30 | Refills: 0
Start: 2019-10-03 | End: 2019-11-01

## 2019-10-03 RX ORDER — FUROSEMIDE 40 MG
1 TABLET ORAL
Qty: 30 | Refills: 0
Start: 2019-10-03 | End: 2019-11-01

## 2019-10-03 RX ORDER — AMLODIPINE BESYLATE 2.5 MG/1
10 TABLET ORAL DAILY
Refills: 0 | Status: CANCELLED | OUTPATIENT
Start: 2019-10-04 | End: 2019-10-03

## 2019-10-03 RX ORDER — IRBESARTAN 75 MG/1
1 TABLET ORAL
Qty: 30 | Refills: 0
Start: 2019-10-03 | End: 2019-11-01

## 2019-10-03 RX ADMIN — Medication 100 MILLIGRAM(S): at 05:32

## 2019-10-03 RX ADMIN — CHLORHEXIDINE GLUCONATE 1 APPLICATION(S): 213 SOLUTION TOPICAL at 05:36

## 2019-10-03 RX ADMIN — APIXABAN 5 MILLIGRAM(S): 2.5 TABLET, FILM COATED ORAL at 05:32

## 2019-10-03 RX ADMIN — Medication 40 MILLIGRAM(S): at 05:32

## 2019-10-03 RX ADMIN — AMLODIPINE BESYLATE 5 MILLIGRAM(S): 2.5 TABLET ORAL at 05:32

## 2019-10-03 RX ADMIN — LOSARTAN POTASSIUM 100 MILLIGRAM(S): 100 TABLET, FILM COATED ORAL at 05:32

## 2019-10-03 RX ADMIN — Medication 50 MILLIGRAM(S): at 05:32

## 2019-10-03 NOTE — PROGRESS NOTE ADULT - ASSESSMENT
Assessment and Recommendation:   · Assessment		  85 year old man admitted with worsening shortness of breath... currently improved post diuresis and BIPAP.  He has a known history of  CHF.  Chronic AF, s/p CVA (mild residual deficit)... not on anticoagulation due to him refusing.  HTN  He has not been compliant with home medical regimen.  CHF exacerbation likely precipitated by severe HTN and patient not being compliant with home medications  Elevated troponin likely due to demand ischemia    Plan  - continue IV Lasix  - consider ACE-I for elevated BP  - consider metoprolol for rate control of AF.. watch for bradycardia   - he is at risk for thromboembolism... resume a/c  - review records especially cath report if available  - may need to repeat  ischemia evaluation  - further recommendations pending hospital course and workup
84 y/o male with history of CVA, Afib, CHF, HTN admitted to the ICU for acute decompensation of heart failure requiring BiPaP, due to medication non compliance, improved, now on NC    #Acute on chronic systolic CHF: 2/2 medication noncompliance, EF 25%  - continue IV lasix  - strict I&Os  - daily weights  - resume Toprol and ARB (on irbesartan at home, will use losartan while inpatient)  - BiPaP PRN    #History of CVA:  - continue aspirin    #Chronic afib:   - resume Bblocker for rate control  - resume Eliquis     #HTN:   - resume ARB  - if hypertensive after starting ARB, will resume Norvasc    #DVT ppx:  - Pt is already anticoagulated with Eliquis    CAPRINI SCORE [CLOT]    AGE RELATED RISK FACTORS                                                       MOBILITY RELATED FACTORS  [ ] Age 41-60 years                                            (1 Point)                  [ ] Bed rest                                                        (1 Point)  [ ] Age: 61-74 years                                           (2 Points)                 [ ] Plaster cast                                                   (2 Points)  [x ] Age= 75 years                                              (3 Points)                 [ ] Bed bound for more than 72 hours                 (2 Points)    DISEASE RELATED RISK FACTORS                                               GENDER SPECIFIC FACTORS  [ ] Edema in the lower extremities                       (1 Point)                  [ ] Pregnancy                                                     (1 Point)  [ ] Varicose veins                                               (1 Point)                  [ ] Post-partum < 6 weeks                                   (1 Point)             [x] BMI > 25 Kg/m2                                            (1 Point)                  [ ] Hormonal therapy  or oral contraception          (1 Point)                 [ ] Sepsis (in the previous month)                        (1 Point)                  [ ] History of pregnancy complications                 (1 point)  [ ] Pneumonia or serious lung disease                                               [ ] Unexplained or recurrent                     (1 Point)           (in the previous month)                               (1 Point)  [ ] Abnormal pulmonary function test                     (1 Point)                 SURGERY RELATED RISK FACTORS  [ ] Acute myocardial infarction                              (1 Point)                 [ ]  Section                                             (1 Point)  [x] Congestive heart failure (in the previous month)  (1 Point)               [ ] Minor surgery                                                  (1 Point)   [ ] Inflammatory bowel disease                             (1 Point)                 [ ] Arthroscopic surgery                                        (2 Points)  [ ] Central venous access                                      (2 Points)                [ ] General surgery lasting more than 45 minutes   (2 Points)       [ ] Stroke (in the previous month)                          (5 Points)               [ ] Elective arthroplasty                                         (5 Points)                                                                                                                                               HEMATOLOGY RELATED FACTORS                                                 TRAUMA RELATED RISK FACTORS  [ ] Prior episodes of VTE                                     (3 Points)                 [ ] Fracture of the hip, pelvis, or leg                       (5 Points)  [ ] Positive family history for VTE                         (3 Points)                 [ ] Acute spinal cord injury (in the previous month)  (5 Points)  [ ] Prothrombin 95079 A                                     (3 Points)                 [ ] Paralysis  (less than 1 month)                             (5 Points)  [ ] Factor V Leiden                                             (3 Points)                  [ ] Multiple Trauma within 1 month                        (5 Points)  [ ] Lupus anticoagulants                                     (3 Points)                                                           [ ] Anticardiolipin antibodies                               (3 Points)                                                       [ ] High homocysteine in the blood                      (3 Points)                                             [ ] Other congenital or acquired thrombophilia      (3 Points)                                                [ ] Heparin induced thrombocytopenia                  (3 Points)                                          Total Score [   5   ]
85 year old man admitted for heart failure... acute on chronic systolic and diastolic CHF.  He has AF, s/p CVA, HTN.  He has a known history of being non compliant with medications.  Echo now demonstrates severe LV dysfunction.. rule out tachycardia-induced cardiomyopathy.  Elevated troponin likely due to demand ischemia.  I discussed with his primary cardiologist Dr. Rubio over the telephone... he does not favor an initial invasive strategy given the patient's history of medical non compliance    Plan  - schedule vasodilator myocardial perfusion stress test to evaluate for ischemia and risk stratification   - increase metoprolol succinate  - continue ARB  - continue anticoagulation  - continue diuresis, may switch to PO Lasix  - discussed with patient at length about importance of remaining compliant with medications    discussed with Medicine team
Physical Examination:  GENERAL:               Alert, Oriented, No acute distress.    HEENT:                    Pupils equal, reactive to light.  Symmetric. No JVD, Moist MM  PULM:                     Bilateral air entry, bibasilar Rales, No Rhonchi, No Wheezing  CVS:                         S1, S2,  No Murmur  ABD:                        Soft, nondistended, nontender, normoactive bowel sounds,   EXT:                         +1 edema, nontender, No Cyanosis or Clubbing   Vascular:                Warm Extremities, Normal Capillary refill, Normal Distal Pulses  SKIN:                       Warm and well perfused, no rashes noted.   NEURO:                  Alert, oriented, interactive, nonfocal, follows commands  PSYC:                      Calm, + Insight.    Assessment:  86 y/o male with history of CVA, Afib, Diastolic heart dysfunction, HTN admitted to the ICU for acute decompensation of heart failure due to medication non compliance. Initially noted to be hypertensive with pulmonary edema, which improved with diuresis.     1. Acute respiratory failure  2. Acute decompensation of diastolic heart dysfunction  3. Rapid afib  4. Hypertensive urgency   5. CVA history     - Cont. Diuresis to maintain euvolemia  - Off bipap, comfortable on NC  - Trend cardiac markers  - Cont. lovenox for now  - Cardiology consult   - Check Echo  - Cont. Hydralazine   - Procalcitonin low, no signs of infection, will hold off antibiotics for now   - Mediation compliance endorsed  - Transfer out of ICU today to telemetry
Physical Examination:  GENERAL:               Alert, Oriented, No acute distress.    HEENT:                    Pupils equal, reactive to light.  Symmetric. No JVD, Moist MM  PULM:                     Bilateral air entry, minimal Rales, No Rhonchi, No Wheezing  CVS:                         S1, S2,  No Murmur  ABD:                        Soft, nondistended, nontender, normoactive bowel sounds,   EXT:                        trace edema, nontender, No Cyanosis or Clubbing   Vascular:                Warm Extremities, Normal Capillary refill, Normal Distal Pulses  SKIN:                       Warm and well perfused, no rashes noted.   NEURO:                  Alert, oriented, interactive, nonfocal, follows commands  PSYC:                      Calm, + Insight.    Assessment:  84 y/o male with history of CVA, Afib, heart dysfunction, HTN admitted to the ICU for acute decompensation of heart failure due to medication non compliance. Initially noted to be hypertensive with pulmonary edema, which improved with diuresis.     1. Acute respiratory failure  2. Acute decompensation of systolic heart dysfunction  3. Rapid afib  4. Hypertensive urgency   5. CVA history     - Cont. Diuresis to maintain euvolemia  - Off bipap, comfortable on NC  - May need a PSG as outpatient to evaluate for sleep apnea   - Back on apixaban  - Cardiology consult appreciated   - Echo with reduced EF  - Cont. Hydralazine   - Procalcitonin low, no signs of infection, will hold off antibiotics for now   - Mediation compliance endorsed  - Cont. care on telemetry
Physical Examination:  GENERAL:               Alert, Oriented, No acute distress.    PULM:                     Bilateral air entry, minimal Rales, No Rhonchi, No Wheezing  CVS:                         S1, S2,  No Murmur  ABD:                        Soft, nondistended, nontender, normoactive bowel sounds,   EXT:                        trace edema, nontender, No Cyanosis or Clubbing   NEURO:                  Alert, oriented, interactive, nonfocal, follows commands  PSYC:                      Calm, + Insight.    Assessment:  84 y/o male with history of CVA, Afib, heart dysfunction, HTN admitted to the ICU for acute decompensation of heart failure due to medication non compliance. Initially noted to be hypertensive with pulmonary edema, which improved with diuresis.     1. Acute respiratory failure due to Acute decompensation of systolic heart dysfunction  2. Rapid afib  3. Hypertensive urgency   4. CVA history     - Cont. Diuresis to maintain euvolemia  - Off bipap, comfortable on NC  - May need a PSG as outpatient to evaluate for sleep apnea   - Back on apixaban  - Cardiology F/U to optimize cardiac meds  - Echo with reduced EF  - Cont. Hydralazine   - Cont. care on telemetry, no active ccm issues reconsult as needed  d/w Dr. Reese.
84 y/o male with history of CVA, Afib, CHF, HTN admitted to the ICU for acute decompensation of heart failure requiring BiPaP, due to medication non compliance, improved, now on NC    #Acute on chronic systolic CHF: 2/2 medication noncompliance, EF 25%, moderately to severely decreased global left ventricular systolic function  - continue PO Lasix  - strict I&Os  - daily weights  - continue Toprol 50mg XL daily  - continue ARB (on irbesartan at home, will use losartan while inpatient)  - Cardiology following. Dr. German discussed with pt's cardiologist Dr. Rubio over the telephone, he does not favor an initial invasive strategy given the patient's history of medical noncompliance. Pt was recommended for nuclear stress test but he refused, verbalized his understanding that his heart's function may deteriorate further and risk of death from heart failure and arrhythmia. He had cardiac cath before and does not want to undergo another cath regardless of what the  stress test shows so he sees no need to undergo any stress test.     #History of CVA:  - continue aspirin    #Chronic afib:   -continue Toprol XL 50mg daily  - continue Eliquis    #HTN:   - continue Losartan 100mg daily  - continue Norvasc 5mg daily    #DVT ppx:  - Pt is already anticoagulated with Eliquis    CAPRINI SCORE [CLOT]    AGE RELATED RISK FACTORS                                                       MOBILITY RELATED FACTORS  [ ] Age 41-60 years                                            (1 Point)                  [ ] Bed rest                                                        (1 Point)  [ ] Age: 61-74 years                                           (2 Points)                 [ ] Plaster cast                                                   (2 Points)  [x ] Age= 75 years                                              (3 Points)                 [ ] Bed bound for more than 72 hours                 (2 Points)    DISEASE RELATED RISK FACTORS                                               GENDER SPECIFIC FACTORS  [ ] Edema in the lower extremities                       (1 Point)                  [ ] Pregnancy                                                     (1 Point)  [ ] Varicose veins                                               (1 Point)                  [ ] Post-partum < 6 weeks                                   (1 Point)             [x] BMI > 25 Kg/m2                                            (1 Point)                  [ ] Hormonal therapy  or oral contraception          (1 Point)                 [ ] Sepsis (in the previous month)                        (1 Point)                  [ ] History of pregnancy complications                 (1 point)  [ ] Pneumonia or serious lung disease                                               [ ] Unexplained or recurrent                     (1 Point)           (in the previous month)                               (1 Point)  [ ] Abnormal pulmonary function test                     (1 Point)                 SURGERY RELATED RISK FACTORS  [ ] Acute myocardial infarction                              (1 Point)                 [ ]  Section                                             (1 Point)  [x] Congestive heart failure (in the previous month)  (1 Point)               [ ] Minor surgery                                                  (1 Point)   [ ] Inflammatory bowel disease                             (1 Point)                 [ ] Arthroscopic surgery                                        (2 Points)  [ ] Central venous access                                      (2 Points)                [ ] General surgery lasting more than 45 minutes   (2 Points)       [ ] Stroke (in the previous month)                          (5 Points)               [ ] Elective arthroplasty                                         (5 Points)                                                                                                                                               HEMATOLOGY RELATED FACTORS                                                 TRAUMA RELATED RISK FACTORS  [ ] Prior episodes of VTE                                     (3 Points)                 [ ] Fracture of the hip, pelvis, or leg                       (5 Points)  [ ] Positive family history for VTE                         (3 Points)                 [ ] Acute spinal cord injury (in the previous month)  (5 Points)  [ ] Prothrombin 40001 A                                     (3 Points)                 [ ] Paralysis  (less than 1 month)                             (5 Points)  [ ] Factor V Leiden                                             (3 Points)                  [ ] Multiple Trauma within 1 month                        (5 Points)  [ ] Lupus anticoagulants                                     (3 Points)                                                           [ ] Anticardiolipin antibodies                               (3 Points)                                                       [ ] High homocysteine in the blood                      (3 Points)                                             [ ] Other congenital or acquired thrombophilia      (3 Points)                                                [ ] Heparin induced thrombocytopenia                  (3 Points)                                          Total Score [   5   ]
86 y/o male with history of CVA, Afib, CHF, HTN admitted to the ICU for acute decompensation of heart failure requiring BiPaP, due to medication non compliance, improved, now on NC    #Acute on chronic systolic CHF: 2/2 medication noncompliance, EF 25%, moderately to severely decreased global left ventricular systolic function  - switch to PO lasix  - strict I&Os  - daily weights  - increase Toprol XL from 25 to 50mg daily  - continue ARB (on irbesartan at home, will use losartan while inpatient)  - Cardiology following. Dr. German discussed with pt's cardiologist Dr. Rubio over the telephone, he does not favor an initial invasive strategy given the patient's history of medical noncompliance  - Nuclear stress test tomorrow    #History of CVA:  - continue aspirin    #Chronic afib:   - increase Toprol XL from 25mg to 50mg daily  - continue Eliquis    #HTN:   - continue Losartan 100mg daily  - resume home Norvasc 5mg daily    #DVT ppx:  - Pt is already anticoagulated with Eliquis    CAPRINI SCORE [CLOT]    AGE RELATED RISK FACTORS                                                       MOBILITY RELATED FACTORS  [ ] Age 41-60 years                                            (1 Point)                  [ ] Bed rest                                                        (1 Point)  [ ] Age: 61-74 years                                           (2 Points)                 [ ] Plaster cast                                                   (2 Points)  [x ] Age= 75 years                                              (3 Points)                 [ ] Bed bound for more than 72 hours                 (2 Points)    DISEASE RELATED RISK FACTORS                                               GENDER SPECIFIC FACTORS  [ ] Edema in the lower extremities                       (1 Point)                  [ ] Pregnancy                                                     (1 Point)  [ ] Varicose veins                                               (1 Point)                  [ ] Post-partum < 6 weeks                                   (1 Point)             [x] BMI > 25 Kg/m2                                            (1 Point)                  [ ] Hormonal therapy  or oral contraception          (1 Point)                 [ ] Sepsis (in the previous month)                        (1 Point)                  [ ] History of pregnancy complications                 (1 point)  [ ] Pneumonia or serious lung disease                                               [ ] Unexplained or recurrent                     (1 Point)           (in the previous month)                               (1 Point)  [ ] Abnormal pulmonary function test                     (1 Point)                 SURGERY RELATED RISK FACTORS  [ ] Acute myocardial infarction                              (1 Point)                 [ ]  Section                                             (1 Point)  [x] Congestive heart failure (in the previous month)  (1 Point)               [ ] Minor surgery                                                  (1 Point)   [ ] Inflammatory bowel disease                             (1 Point)                 [ ] Arthroscopic surgery                                        (2 Points)  [ ] Central venous access                                      (2 Points)                [ ] General surgery lasting more than 45 minutes   (2 Points)       [ ] Stroke (in the previous month)                          (5 Points)               [ ] Elective arthroplasty                                         (5 Points)                                                                                                                                               HEMATOLOGY RELATED FACTORS                                                 TRAUMA RELATED RISK FACTORS  [ ] Prior episodes of VTE                                     (3 Points)                 [ ] Fracture of the hip, pelvis, or leg                       (5 Points)  [ ] Positive family history for VTE                         (3 Points)                 [ ] Acute spinal cord injury (in the previous month)  (5 Points)  [ ] Prothrombin 96033 A                                     (3 Points)                 [ ] Paralysis  (less than 1 month)                             (5 Points)  [ ] Factor V Leiden                                             (3 Points)                  [ ] Multiple Trauma within 1 month                        (5 Points)  [ ] Lupus anticoagulants                                     (3 Points)                                                           [ ] Anticardiolipin antibodies                               (3 Points)                                                       [ ] High homocysteine in the blood                      (3 Points)                                             [ ] Other congenital or acquired thrombophilia      (3 Points)                                                [ ] Heparin induced thrombocytopenia                  (3 Points)                                          Total Score [   5   ]

## 2019-10-03 NOTE — PROGRESS NOTE ADULT - REASON FOR ADMISSION
Worsening SOB

## 2019-10-03 NOTE — PROGRESS NOTE ADULT - ATTENDING COMMENTS
Pt is medically stable for discharge, emphasized importance of medication compliance. He refused nuclear stress test because regardless of results, he does not want to undergo cardiac cath. He has capacity to make medical decisions and understands the risk of deterioration of his heart failure, possible arrhythmias, and death. He is otherwise medically stable to be discharged to home.      Time spent on discharge 35 minutes

## 2019-10-03 NOTE — DISCHARGE NOTE NURSING/CASE MANAGEMENT/SOCIAL WORK - [facility]APPT_GEN_ALL_CORE_FT
appointment made for follow up with Michelle CROOK (Dr Duval not available)   Oct 9 at 3pm.  Appointment card provided to pt.

## 2019-10-03 NOTE — PROGRESS NOTE ADULT - SUBJECTIVE AND OBJECTIVE BOX
Patient is a 85y old  Male who presents with a chief complaint of Worsening SOB (02 Oct 2019 15:26)      Interval HPI/ Overnight events: No events overnight    Patient seen and examined at bedside, has no complaints. He refuses to go for Nuclear stress test.    REVIEW OF SYSTEMS:    CONSTITUTIONAL: No weakness, fevers or chills  EYES/ENT: No visual changes;  No vertigo or throat pain   NECK: No pain or stiffness  RESPIRATORY: No cough, wheezing, hemoptysis; No shortness of breath  CARDIOVASCULAR: No chest pain or palpitations  GASTROINTESTINAL: No abdominal or epigastric pain. No nausea, vomiting, or hematemesis; No diarrhea or constipation. No melena or hematochezia.  GENITOURINARY: No dysuria, frequency or hematuria  NEUROLOGICAL: No numbness or weakness  SKIN: No itching, burning, rashes, or lesions   MSK: no joint pain, no joint swelling  All other review of systems is negative unless indicated above.      ALLERGIES:  No Known Allergies    MEDICATIONS  (STANDING):  apixaban 5 milliGRAM(s) Oral two times a day  aspirin enteric coated 81 milliGRAM(s) Oral daily  chlorhexidine 4% Liquid 1 Application(s) Topical <User Schedule>  docusate sodium 100 milliGRAM(s) Oral two times a day  furosemide    Tablet 40 milliGRAM(s) Oral daily  influenza   Vaccine 0.5 milliLiter(s) IntraMuscular once  losartan 100 milliGRAM(s) Oral daily  metoprolol succinate ER 50 milliGRAM(s) Oral daily  polyethylene glycol 3350 17 Gram(s) Oral daily  regadenoson Injectable 0.4 milliGRAM(s) IV Push once  simethicone 80 milliGRAM(s) Chew daily    MEDICATIONS  (PRN):    Vital Signs Last 24 Hrs  T(F): 97.8 (03 Oct 2019 05:35), Max: 98.3 (02 Oct 2019 21:13)  HR: 75 (03 Oct 2019 05:35) (75 - 90)  BP: 158/83 (03 Oct 2019 05:35) (158/83 - 167/87)  RR: 16 (03 Oct 2019 05:35) (16 - 16)  SpO2: 98% (03 Oct 2019 05:35) (98% - 99%)  I&O's Summary    02 Oct 2019 07:01  -  03 Oct 2019 07:00  --------------------------------------------------------  IN: 250 mL / OUT: 550 mL / NET: -300 mL        PHYSICAL EXAM:  General: NAD, well dressed, well nourished  Head: NT/AC  ENT: MMM, no oropharyngeal erythema or exudates  Neck: Supple, No JVD  Lungs: CTA b/l, no wheezing or rhonchi, no accessory muscle use  Cardio: RRR, normal S1/S2, No M/R/G  Abdomen: Normoactive BS, Abdomen soft, nontender, nondistended; no organomegaly  Extremities: No calf tenderness, no clubbing or  cyanosis  Vascular: no LE edema  Lymph: no cervical LAD  Skin: warm and dry  Neuro: AAOx3, answers all questions appropriately, moves all extremities    LABS:                        13.1   9.60  )-----------( 149      ( 02 Oct 2019 06:50 )             39.4     10-03    142  |  105  |  31  ----------------------------<  99  3.9   |  28  |  1.13    Ca    8.6      03 Oct 2019 07:10  Mg     2.0     10-03      eGFR if Non African American: 59 mL/min/1.73M2 (10-03-19 @ 07:10)  eGFR if : 68 mL/min/1.73M2 (10-03-19 @ 07:10)        CARDIAC MARKERS ( 30 Sep 2019 21:45 )  .710 ng/mL / x     / x     / x     / x      CARDIAC MARKERS ( 30 Sep 2019 12:23 )  .925 ng/mL / x     / x     / x     / x                Culture - Urine (collected 30 Sep 2019 02:35)  Source: .Urine Clean Catch (Midstream)  Final Report (01 Oct 2019 07:56):    No growth    Culture - Blood (collected 30 Sep 2019 01:41)  Source: .Blood Blood-Peripheral  Preliminary Report (01 Oct 2019 10:00):    No growth to date.    Culture - Blood (collected 30 Sep 2019 01:41)  Source: .Blood Blood-Peripheral  Preliminary Report (01 Oct 2019 10:00):    No growth to date.        RADIOLOGY & ADDITIONAL TESTS:    Care Discussed with Consultants/Other Providers:

## 2019-10-03 NOTE — DISCHARGE NOTE PROVIDER - HOSPITAL COURSE
84 yo Male with history of CVA, Afib, systolic CHF, HTN admitted to the ICU for acute decompensation of heart failure requiring BiPaP, due to medication non compliance. He was diuresed with IV lasix then transitioned to PO. ECHO shows LVEF 25-30%, moderate to severely decreased global LV function, grade III diastolic dysfunction. Pt was seen by cardiology, who also discussed with pt's cardiologist, Dr. Rubio. Patient is noncompliant so immediate invasive/ cardiac cath not recommended. Pt was recommended for nuclear stress test but he refused, verbalized his understanding that his heart's function may deteriorate further and risk of death from heart failure. He had cardiac cath before and does not want to undergo another cath regardless of what the  stress test shows so sees no need to undergo stress test. He does not want further testing at this point. Pt medically stable for discharge to home.

## 2019-10-03 NOTE — DISCHARGE NOTE PROVIDER - NSDCCPCAREPLAN_GEN_ALL_CORE_FT
PRINCIPAL DISCHARGE DIAGNOSIS  Diagnosis: Acute on chronic congestive heart failure, unspecified heart failure type  Assessment and Plan of Treatment: Please take Lasix 40mg once a day      SECONDARY DISCHARGE DIAGNOSES  Diagnosis: Atrial fibrillation  Assessment and Plan of Treatment: Continue Eliquis 5mg twice a day and metoprolol succinate (Toprol XL) 50mg once a day    Diagnosis: Acute respiratory failure with hypoxia and hypercapnia  Assessment and Plan of Treatment:

## 2019-10-03 NOTE — DISCHARGE NOTE NURSING/CASE MANAGEMENT/SOCIAL WORK - PATIENT PORTAL LINK FT
You can access the FollowMyHealth Patient Portal offered by [facility] by registering at the following website: [email or web address]. By joining [facility]’s FollowMyHealth portal, you will also be able to view your health information using other applications (apps) compatible with our system.

## 2019-12-06 NOTE — CHART NOTE - NSCHARTNOTEFT_GEN_A_CORE
called by RN pt bradycardic overnight as low to 30s -50s and occ bursts to 140s.   Pt asx.   d/c toprol - - -

## 2020-06-30 NOTE — ED ADULT NURSE NOTE - NS ED NURSE LEVEL OF CONSCIOUSNESS ORIENTATION
Detail Level: Detailed Depth Of Biopsy: dermis Was A Bandage Applied: Yes Size Of Lesion In Cm: 0.5 X Size Of Lesion In Cm: 0 Biopsy Type: H and E Biopsy Method: Dermablade Anesthesia Type: 1% lidocaine without epinephrine Hemostasis: Drysol Wound Care: Petrolatum Dressing: bandage Destruction After The Procedure: No Type Of Destruction Used: Curettage Curettage Text: The wound bed was treated with curettage after the biopsy was performed. Cryotherapy Text: The wound bed was treated with cryotherapy after the biopsy was performed. Oriented - self; Oriented - place; Oriented - time Electrodesiccation Text: The wound bed was treated with electrodesiccation after the biopsy was performed. Electrodesiccation And Curettage Text: The wound bed was treated with electrodesiccation and curettage after the biopsy was performed. Silver Nitrate Text: The wound bed was treated with silver nitrate after the biopsy was performed. Lab: -7 Lab Facility: 3 Consent: Written consent was obtained and risks were reviewed including but not limited to scarring, infection, bleeding, scabbing, incomplete removal, nerve damage and allergy to anesthesia. Post-Care Instructions: I reviewed with the patient in detail post-care instructions. Patient is to keep the biopsy site dry overnight, and then apply bacitracin twice daily until healed. Patient may apply hydrogen peroxide soaks to remove any crusting. Notification Instructions: Patient will be notified of biopsy results. However, patient instructed to call the office if not contacted within 2 weeks. Billing Type: Third-Party Bill Information: Selecting Yes will display possible errors in your note based on the variables you have selected. This validation is only offered as a suggestion for you. PLEASE NOTE THAT THE VALIDATION TEXT WILL BE REMOVED WHEN YOU FINALIZE YOUR NOTE. IF YOU WANT TO FAX A PRELIMINARY NOTE YOU WILL NEED TO TOGGLE THIS TO 'NO' IF YOU DO NOT WANT IT IN YOUR FAXED NOTE.

## 2020-08-25 ENCOUNTER — EMERGENCY (EMERGENCY)
Facility: HOSPITAL | Age: 85
LOS: 1 days | Discharge: HOME | End: 2020-08-25
Attending: INTERNAL MEDICINE | Admitting: INTERNAL MEDICINE
Payer: MEDICARE

## 2020-08-25 VITALS
WEIGHT: 164.91 LBS | HEART RATE: 61 BPM | TEMPERATURE: 98 F | OXYGEN SATURATION: 97 % | HEIGHT: 67 IN | SYSTOLIC BLOOD PRESSURE: 184 MMHG | RESPIRATION RATE: 18 BRPM | DIASTOLIC BLOOD PRESSURE: 87 MMHG

## 2020-08-25 VITALS
HEART RATE: 70 BPM | RESPIRATION RATE: 18 BRPM | DIASTOLIC BLOOD PRESSURE: 77 MMHG | TEMPERATURE: 99 F | SYSTOLIC BLOOD PRESSURE: 190 MMHG | OXYGEN SATURATION: 96 %

## 2020-08-25 DIAGNOSIS — R05 COUGH: ICD-10-CM

## 2020-08-25 LAB
ALBUMIN SERPL ELPH-MCNC: 3.8 G/DL — SIGNIFICANT CHANGE UP (ref 3.3–5)
ALP SERPL-CCNC: 50 U/L — SIGNIFICANT CHANGE UP (ref 40–120)
ALT FLD-CCNC: 18 U/L — SIGNIFICANT CHANGE UP (ref 10–45)
ANION GAP SERPL CALC-SCNC: 12 MMOL/L — SIGNIFICANT CHANGE UP (ref 5–17)
AST SERPL-CCNC: 29 U/L — SIGNIFICANT CHANGE UP (ref 10–40)
BASOPHILS # BLD AUTO: 0.04 K/UL — SIGNIFICANT CHANGE UP (ref 0–0.2)
BASOPHILS NFR BLD AUTO: 0.3 % — SIGNIFICANT CHANGE UP (ref 0–2)
BILIRUB SERPL-MCNC: 0.6 MG/DL — SIGNIFICANT CHANGE UP (ref 0.2–1.2)
BUN SERPL-MCNC: 25 MG/DL — HIGH (ref 7–23)
CALCIUM SERPL-MCNC: 8.8 MG/DL — SIGNIFICANT CHANGE UP (ref 8.4–10.5)
CHLORIDE SERPL-SCNC: 106 MMOL/L — SIGNIFICANT CHANGE UP (ref 96–108)
CO2 SERPL-SCNC: 26 MMOL/L — SIGNIFICANT CHANGE UP (ref 22–31)
CREAT SERPL-MCNC: 1.28 MG/DL — SIGNIFICANT CHANGE UP (ref 0.5–1.3)
EOSINOPHIL # BLD AUTO: 0.79 K/UL — HIGH (ref 0–0.5)
EOSINOPHIL NFR BLD AUTO: 6.6 % — HIGH (ref 0–6)
GLUCOSE SERPL-MCNC: 125 MG/DL — HIGH (ref 70–99)
HCT VFR BLD CALC: 39.6 % — SIGNIFICANT CHANGE UP (ref 39–50)
HGB BLD-MCNC: 13.1 G/DL — SIGNIFICANT CHANGE UP (ref 13–17)
IMM GRANULOCYTES NFR BLD AUTO: 0.6 % — SIGNIFICANT CHANGE UP (ref 0–1.5)
LYMPHOCYTES # BLD AUTO: 16.8 % — SIGNIFICANT CHANGE UP (ref 13–44)
LYMPHOCYTES # BLD AUTO: 2.02 K/UL — SIGNIFICANT CHANGE UP (ref 1–3.3)
MCHC RBC-ENTMCNC: 33.1 GM/DL — SIGNIFICANT CHANGE UP (ref 32–36)
MCHC RBC-ENTMCNC: 33.2 PG — SIGNIFICANT CHANGE UP (ref 27–34)
MCV RBC AUTO: 100.5 FL — HIGH (ref 80–100)
MONOCYTES # BLD AUTO: 1.42 K/UL — HIGH (ref 0–0.9)
MONOCYTES NFR BLD AUTO: 11.8 % — SIGNIFICANT CHANGE UP (ref 2–14)
NEUTROPHILS # BLD AUTO: 7.67 K/UL — HIGH (ref 1.8–7.4)
NEUTROPHILS NFR BLD AUTO: 63.9 % — SIGNIFICANT CHANGE UP (ref 43–77)
NRBC # BLD: 0 /100 WBCS — SIGNIFICANT CHANGE UP (ref 0–0)
PLATELET # BLD AUTO: 148 K/UL — LOW (ref 150–400)
POTASSIUM SERPL-MCNC: 4.1 MMOL/L — SIGNIFICANT CHANGE UP (ref 3.5–5.3)
POTASSIUM SERPL-SCNC: 4.1 MMOL/L — SIGNIFICANT CHANGE UP (ref 3.5–5.3)
PROT SERPL-MCNC: 7.9 G/DL — SIGNIFICANT CHANGE UP (ref 6–8.3)
RBC # BLD: 3.94 M/UL — LOW (ref 4.2–5.8)
RBC # FLD: 13.7 % — SIGNIFICANT CHANGE UP (ref 10.3–14.5)
SODIUM SERPL-SCNC: 144 MMOL/L — SIGNIFICANT CHANGE UP (ref 135–145)
WBC # BLD: 12.01 K/UL — HIGH (ref 3.8–10.5)
WBC # FLD AUTO: 12.01 K/UL — HIGH (ref 3.8–10.5)

## 2020-08-25 PROCEDURE — 96374 THER/PROPH/DIAG INJ IV PUSH: CPT

## 2020-08-25 PROCEDURE — 99284 EMERGENCY DEPT VISIT MOD MDM: CPT | Mod: CS

## 2020-08-25 PROCEDURE — U0003: CPT

## 2020-08-25 PROCEDURE — 99284 EMERGENCY DEPT VISIT MOD MDM: CPT | Mod: 25

## 2020-08-25 PROCEDURE — 80053 COMPREHEN METABOLIC PANEL: CPT

## 2020-08-25 PROCEDURE — 71045 X-RAY EXAM CHEST 1 VIEW: CPT | Mod: 26

## 2020-08-25 PROCEDURE — 36415 COLL VENOUS BLD VENIPUNCTURE: CPT

## 2020-08-25 PROCEDURE — 71045 X-RAY EXAM CHEST 1 VIEW: CPT

## 2020-08-25 PROCEDURE — 85027 COMPLETE CBC AUTOMATED: CPT

## 2020-08-25 RX ADMIN — Medication 110 MILLIGRAM(S): at 21:34

## 2020-08-25 NOTE — ED ADULT NURSE NOTE - SUICIDE SCREENING DEPRESSION
Health Maintenance Due   Topic Date Due   • Diabetes Eye Exam  07/02/2000   • Diabetes Foot Exam  07/02/2000   • Pneumococcal 19-64 Medium Risk (1 of 1 - PPSV23) 07/02/2001   • Diabetes Urine Microalbumin  07/31/2018   • Depression Screening  04/17/2019       Patient is due for topics as listed above but is not proceeding with Immunization(s) Pneumococcal, Diabetes Eye Exam and Diabetes Urine Microalbumin at this time. Education provided for Immunization(s) Pneumococcal, Diabetes Eye Exam, Diabetes Foot Exam and Diabetes Urine Microalbumin             Negative

## 2020-08-25 NOTE — ED PROVIDER NOTE - CLINICAL SUMMARY MEDICAL DECISION MAKING FREE TEXT BOX
Pt is 85 y/o male with PMhx of CVA, Afib (on Eliquis) , systolic CHF (Ef of 25-30%), HTN, HLD here for eval of cough. As per pt he has been having cough productive of clear phlegm, sore throat and was noted to have oral temp of 99.9. Denies cp, sob, abd pain, n/v/d, denies HA, rash, dizziness, myalgias, denies sick contacts or recent travel. pcp dr Rubio; denies drooling, trismus or change in voice; (-) leg pain /edema, orthopnea or PND; lungs clear cta, moves air well, o2 sat 97%, CXR no clear infiltrate, rapid covid neg - will dc on doxy, pcp f/u.

## 2020-08-25 NOTE — ED PROVIDER NOTE - PATIENT PORTAL LINK FT
You can access the FollowMyHealth Patient Portal offered by [facility] by registering at the following website: [email or web address]. By joining [facility]’s FollowMyHealth portal, you will also be able to view your health information using other applications (apps) compatible with our system.

## 2020-08-25 NOTE — ED PROVIDER NOTE - OBJECTIVE STATEMENT
Pt is 87 y/o male with PMhx of CVA, Afib (on Eliquis) , systolic CHF (Ef of 25-30%), HTN, HLD here for eval of cough. As per pt he has been having cough productive of clear phlegm, sore throat and was noted to have oral temp of 99.9. Denies cp, sob, abd pain, n/v/d, denies HA, rash, dizziness, myalgias, denies sick contacts or recent travel. pcp dr Rubio; denies drooling, trismus or change in voice; (-) leg pain /edema, orthopnea or PND;

## 2020-08-25 NOTE — ED PROVIDER NOTE - ATTENDING CONTRIBUTION TO CARE
Pt is 85 y/o male with PMhx of CVA, Afib (on Eliquis) , systolic CHF (Ef of 25-30%), HTN, HLD here for eval of cough. As per pt he has been having cough productive of clear phlegm, sore throat and was noted to have oral temp of 99.9. Denies cp, sob, abd pain, n/v/d, denies HA, rash, dizziness, myalgias, denies sick contacts or recent travel. pcp dr Rubio; denies drooling, trismus or change in voice; (-) leg pain /edema, orthopnea or PND; lungs clear cta, moves air well, o2 sat 97%, CXR no clear infiltrate, rapid covid neg - will dc on doxy, pcp f/u.  Dr. Garcia:  I have reviewed and discussed with the PA/ resident the case specifics, including the history, physical assessment, evaluation, conclusion, laboratory results, and medical plan. I agree with the contents, and conclusions. I have personally examined, and interviewed the patient.

## 2020-08-25 NOTE — ED ADULT NURSE NOTE - OBJECTIVE STATEMENT
Pt presents to the ED with son for c/o cough which started today. 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 Pt denies any pains, sob. Pt took his temp today and states it was 99.9.

## 2020-08-25 NOTE — ED PROVIDER NOTE - NSFOLLOWUPINSTRUCTIONS_ED_ALL_ED_FT
PLEASE USE ALL MEDICATIONS AS PRESCRIBED, START TAKING ANTIBIOTICS AS WELL, SELF QUARANTINE AT HOME UNTIL YOU GET FINAL RESULTS OF COVID TESTS. RETURN TO ER FOR WORSENING SYMPTOMS;   Acute Bronchitis    WHAT YOU NEED TO KNOW:    Acute bronchitis is swelling and irritation in your lungs. It is usually caused by a virus and most often happens in the winter. Bronchitis may also be caused by bacteria or by a chemical irritant, such as smoke.    DISCHARGE INSTRUCTIONS:    Return to the emergency department if:     You cough up blood.      Your lips or fingernails turn blue.      You feel like you are not getting enough air when you breathe.    Call your doctor if:     Your symptoms do not go away or get worse, even after treatment.      Your cough does not get better within 4 weeks.      You have questions or concerns about your condition or care.    Medicines: You may need any of the following:     Cough suppressants decrease your urge to cough.       Decongestants help loosen mucus in your lungs and make it easier to cough up. This can help you breathe easier.      Inhalers may be given. Your healthcare provider may give you one or more inhalers to help you breathe easier and cough less. An inhaler gives your medicine to open your airways. Ask your healthcare provider to show you how to use your inhaler correctly.Metered Dose Inhaler           Acetaminophen decreases pain and fever. It is available without a doctor's order. Ask how much to take and how often to take it. Follow directions. Read the labels of all other medicines you are using to see if they also contain acetaminophen, or ask your doctor or pharmacist. Acetaminophen can cause liver damage if not taken correctly. Do not use more than 4 grams (4,000 milligrams) total of acetaminophen in one day.       NSAIDs help decrease swelling and pain or fever. This medicine is available with or without a doctor's order. NSAIDs can cause stomach bleeding or kidney problems in certain people. If you take blood thinner medicine, always ask your healthcare provider if NSAIDs are safe for you. Always read the medicine label and follow directions.      Take your medicine as directed. Contact your healthcare provider if you think your medicine is not helping or if you have side effects. Tell him of her if you are allergic to any medicine. Keep a list of the medicines, vitamins, and herbs you take. Include the amounts, and when and why you take them. Bring the list or the pill bottles to follow-up visits. Carry your medicine list with you in case of an emergency.    Self-care:     Drink liquids as directed. You may need to drink more liquids than usual to stay hydrated. Ask how much liquid to drink each day and which liquids are best for you.      Use a cool mist humidifier to increase air moisture in your home. This may make it easier for you to breathe and help decrease your cough.       Get more rest. Rest helps your body to heal. Slowly start to do more each day. Rest when you feel it is needed.      Avoid irritants in the air. Avoid chemicals, fumes, and dust. Wear a face mask if you must work around dust or fumes. Stay inside on days when air pollution levels are high. If you have allergies, stay inside when pollen counts are high. Do not use aerosol products, such as spray-on deodorant, bug spray, and hair spray.      Do not smoke or be around others who are smoking. Nicotine and other chemicals in cigarettes and cigars can cause lung damage. Ask your healthcare provider for information if you currently smoke and need help to quit. E-cigarettes or smokeless tobacco still contain nicotine. Talk to your healthcare provider before you use these products.     Prevent acute bronchitis:          Get the vaccinations you need. Ask your healthcare provider if you should get the flu or pneumonia vaccines.      Prevent the spread of germs. You can decrease your risk for acute bronchitis and other illnesses by doing the following:   Wash your hands often with soap and water. Carry germ-killing hand lotion or gel with you. You can use the lotion or gel to clean your hands when soap and water are not available.Handwashing           Do not touch your eyes, nose, or mouth unless you have washed your hands first.      Always cover your mouth when you cough to prevent the spread of germs. It is best to cough into a tissue or your shirt sleeve instead of into your hand. Ask those around you to cover their mouths when they cough.

## 2020-08-26 LAB — SARS-COV-2 RNA SPEC QL NAA+PROBE: SIGNIFICANT CHANGE UP

## 2020-09-17 NOTE — ED ADULT NURSE NOTE - PERIPHERAL VASCULAR WDL
Pulses equal bilaterally, no edema present. - R foot s/p toe amp III-V  - R great toe covered in gauze, purulent drainage per EM signout  - podiatry following, plan for bedside bone biopsy tmrw - recs appreciated  - c/w Vanc 1500 q12 hrs, zosyn [id number]  - f/u Vanc trough before 4th dose (Tue)  - ESR 80, CRP 9.89  - Pt's podiatrist is Dr. Danilo Green

## 2021-02-11 NOTE — PATIENT PROFILE ADULT. - NS PRO PT RIGHT SUPPORT PERSON
Oxana - daughter called    Pt has gotten Reclast infusion last couple of year     Wanted to try to schedule it    [phone]    Notified order was signed 2/5/21 - and will ask infusion staff to reach out to assist with scheduling     Rasheeda VELIZ RN     Yes

## 2021-02-17 ENCOUNTER — EMERGENCY (EMERGENCY)
Facility: HOSPITAL | Age: 86
LOS: 1 days | Discharge: HOME | End: 2021-02-17
Attending: EMERGENCY MEDICINE | Admitting: EMERGENCY MEDICINE
Payer: MEDICARE

## 2021-02-17 VITALS
OXYGEN SATURATION: 95 % | RESPIRATION RATE: 17 BRPM | DIASTOLIC BLOOD PRESSURE: 83 MMHG | SYSTOLIC BLOOD PRESSURE: 172 MMHG | HEART RATE: 95 BPM

## 2021-02-17 VITALS
SYSTOLIC BLOOD PRESSURE: 101 MMHG | TEMPERATURE: 98 F | HEART RATE: 99 BPM | OXYGEN SATURATION: 93 % | HEIGHT: 67 IN | WEIGHT: 177.91 LBS | RESPIRATION RATE: 18 BRPM | DIASTOLIC BLOOD PRESSURE: 71 MMHG

## 2021-02-17 LAB — SARS-COV-2 RNA SPEC QL NAA+PROBE: DETECTED

## 2021-02-17 PROCEDURE — U0005: CPT

## 2021-02-17 PROCEDURE — U0003: CPT

## 2021-02-17 PROCEDURE — 99283 EMERGENCY DEPT VISIT LOW MDM: CPT

## 2021-02-17 PROCEDURE — 87086 URINE CULTURE/COLONY COUNT: CPT

## 2021-02-17 PROCEDURE — 87186 SC STD MICRODIL/AGAR DIL: CPT

## 2021-02-17 NOTE — ED PROVIDER NOTE - NSFOLLOWUPINSTRUCTIONS_ED_ALL_ED_FT
See attached for COVID-19 info / fact sheet.   Take Tylenol 650mg every 6 hours as needed for fever or pain.   Drink fluids, rest, and sanitize at home!  Home quarantine is recommended to monitor symptoms.   Isolate from others as much as possible.   We sent COVID testing that may take up to 2 days for you to receive a result.  We will contact you if there are any findings.   Worsening, continued or ANY new concerning symptoms return to the emergency department.

## 2021-02-17 NOTE — ED POST DISCHARGE NOTE - DETAILS
I called the numbers on file to inform pt of the results, no answer. VM left to call back to the ED Pt contacted regarding pos urine culture - has no urinary sx. Rx for ceftin 250mg BID csent to preferred pharmacy;  Pt also was aware of pos covid test;

## 2021-02-17 NOTE — ED PROVIDER NOTE - PATIENT PORTAL LINK FT
You can access the FollowMyHealth Patient Portal offered by [facility] by registering at the following website: [email or web address]. By joining [facility]’s FollowMyHealth portal, you will also be able to view your health information using other applications (apps) compatible with our system.

## 2021-02-17 NOTE — ED PROVIDER NOTE - OBJECTIVE STATEMENT
86 y/o M with presents for a COVID swab. Pt is asymptomatic. +covid exposure at home. No f/c. Pt reported mild dysuria with urinating last night. No abd pain, n/v/d

## 2021-02-17 NOTE — ED PROVIDER NOTE - CLINICAL SUMMARY MEDICAL DECISION MAKING FREE TEXT BOX
88 y/o M with presents for a COVID swab. Pt is asymptomatic. +covid exposure at home. No f/c. Pt reported mild dysuria with urinating last night. No abd pain, n/v/d. PE as noted above. COVID swab sent. Urine culture sent. pt well appearing. patient stable for dc

## 2021-02-20 RX ORDER — CEFUROXIME AXETIL 250 MG
1 TABLET ORAL
Qty: 14 | Refills: 0
Start: 2021-02-20 | End: 2021-02-26

## 2021-06-12 ENCOUNTER — TRANSCRIPTION ENCOUNTER (OUTPATIENT)
Age: 86
End: 2021-06-12

## 2021-06-12 ENCOUNTER — INPATIENT (INPATIENT)
Facility: HOSPITAL | Age: 86
LOS: 5 days | Discharge: NURSING, REHAB OR LONG-TERM CARE | DRG: 481 | End: 2021-06-18
Attending: ORTHOPAEDIC SURGERY | Admitting: ORTHOPAEDIC SURGERY
Payer: MEDICARE

## 2021-06-12 VITALS
HEIGHT: 67 IN | WEIGHT: 169.98 LBS | OXYGEN SATURATION: 99 % | RESPIRATION RATE: 16 BRPM | SYSTOLIC BLOOD PRESSURE: 175 MMHG | DIASTOLIC BLOOD PRESSURE: 95 MMHG | TEMPERATURE: 98 F | HEART RATE: 60 BPM

## 2021-06-12 DIAGNOSIS — S72.141A DISPLACED INTERTROCHANTERIC FRACTURE OF RIGHT FEMUR, INITIAL ENCOUNTER FOR CLOSED FRACTURE: ICD-10-CM

## 2021-06-12 LAB
ALBUMIN SERPL ELPH-MCNC: 4 G/DL — SIGNIFICANT CHANGE UP (ref 3.3–5)
ALP SERPL-CCNC: 53 U/L — SIGNIFICANT CHANGE UP (ref 40–120)
ALT FLD-CCNC: 13 U/L — SIGNIFICANT CHANGE UP (ref 10–45)
ANION GAP SERPL CALC-SCNC: 17 MMOL/L — SIGNIFICANT CHANGE UP (ref 5–17)
APTT BLD: 30.9 SEC — SIGNIFICANT CHANGE UP (ref 27.5–35.5)
AST SERPL-CCNC: 18 U/L — SIGNIFICANT CHANGE UP (ref 10–40)
BASOPHILS # BLD AUTO: 0.03 K/UL — SIGNIFICANT CHANGE UP (ref 0–0.2)
BASOPHILS NFR BLD AUTO: 0.3 % — SIGNIFICANT CHANGE UP (ref 0–2)
BILIRUB SERPL-MCNC: 0.3 MG/DL — SIGNIFICANT CHANGE UP (ref 0.2–1.2)
BLD GP AB SCN SERPL QL: NEGATIVE — SIGNIFICANT CHANGE UP
BUN SERPL-MCNC: 24 MG/DL — HIGH (ref 7–23)
CALCIUM SERPL-MCNC: 9 MG/DL — SIGNIFICANT CHANGE UP (ref 8.4–10.5)
CHLORIDE SERPL-SCNC: 100 MMOL/L — SIGNIFICANT CHANGE UP (ref 96–108)
CO2 SERPL-SCNC: 25 MMOL/L — SIGNIFICANT CHANGE UP (ref 22–31)
CREAT SERPL-MCNC: 0.99 MG/DL — SIGNIFICANT CHANGE UP (ref 0.5–1.3)
EOSINOPHIL # BLD AUTO: 0.61 K/UL — HIGH (ref 0–0.5)
EOSINOPHIL NFR BLD AUTO: 6.7 % — HIGH (ref 0–6)
GLUCOSE SERPL-MCNC: 121 MG/DL — HIGH (ref 70–99)
HCT VFR BLD CALC: 39.2 % — SIGNIFICANT CHANGE UP (ref 39–50)
HGB BLD-MCNC: 12.7 G/DL — LOW (ref 13–17)
IMM GRANULOCYTES NFR BLD AUTO: 0.6 % — SIGNIFICANT CHANGE UP (ref 0–1.5)
INR BLD: 1.23 RATIO — HIGH (ref 0.88–1.16)
LYMPHOCYTES # BLD AUTO: 1.9 K/UL — SIGNIFICANT CHANGE UP (ref 1–3.3)
LYMPHOCYTES # BLD AUTO: 21 % — SIGNIFICANT CHANGE UP (ref 13–44)
MCHC RBC-ENTMCNC: 32.4 GM/DL — SIGNIFICANT CHANGE UP (ref 32–36)
MCHC RBC-ENTMCNC: 32.5 PG — SIGNIFICANT CHANGE UP (ref 27–34)
MCV RBC AUTO: 100.3 FL — HIGH (ref 80–100)
MONOCYTES # BLD AUTO: 0.96 K/UL — HIGH (ref 0–0.9)
MONOCYTES NFR BLD AUTO: 10.6 % — SIGNIFICANT CHANGE UP (ref 2–14)
NEUTROPHILS # BLD AUTO: 5.49 K/UL — SIGNIFICANT CHANGE UP (ref 1.8–7.4)
NEUTROPHILS NFR BLD AUTO: 60.8 % — SIGNIFICANT CHANGE UP (ref 43–77)
NRBC # BLD: 0 /100 WBCS — SIGNIFICANT CHANGE UP (ref 0–0)
PLATELET # BLD AUTO: 161 K/UL — SIGNIFICANT CHANGE UP (ref 150–400)
POTASSIUM SERPL-MCNC: 3.3 MMOL/L — LOW (ref 3.5–5.3)
POTASSIUM SERPL-SCNC: 3.3 MMOL/L — LOW (ref 3.5–5.3)
PROT SERPL-MCNC: 7.1 G/DL — SIGNIFICANT CHANGE UP (ref 6–8.3)
PROTHROM AB SERPL-ACNC: 14.6 SEC — HIGH (ref 10.6–13.6)
RBC # BLD: 3.91 M/UL — LOW (ref 4.2–5.8)
RBC # FLD: 13.5 % — SIGNIFICANT CHANGE UP (ref 10.3–14.5)
RH IG SCN BLD-IMP: POSITIVE — SIGNIFICANT CHANGE UP
SODIUM SERPL-SCNC: 142 MMOL/L — SIGNIFICANT CHANGE UP (ref 135–145)
TROPONIN T, HIGH SENSITIVITY RESULT: 28 NG/L — SIGNIFICANT CHANGE UP (ref 0–51)
WBC # BLD: 9.04 K/UL — SIGNIFICANT CHANGE UP (ref 3.8–10.5)
WBC # FLD AUTO: 9.04 K/UL — SIGNIFICANT CHANGE UP (ref 3.8–10.5)

## 2021-06-12 PROCEDURE — 99285 EMERGENCY DEPT VISIT HI MDM: CPT | Mod: GC

## 2021-06-12 PROCEDURE — 73552 X-RAY EXAM OF FEMUR 2/>: CPT | Mod: 26,RT

## 2021-06-12 PROCEDURE — 73502 X-RAY EXAM HIP UNI 2-3 VIEWS: CPT | Mod: 26,RT

## 2021-06-12 PROCEDURE — 93010 ELECTROCARDIOGRAM REPORT: CPT | Mod: GC

## 2021-06-12 PROCEDURE — 71045 X-RAY EXAM CHEST 1 VIEW: CPT | Mod: 26

## 2021-06-12 RX ORDER — POTASSIUM CHLORIDE 20 MEQ
40 PACKET (EA) ORAL ONCE
Refills: 0 | Status: COMPLETED | OUTPATIENT
Start: 2021-06-12 | End: 2021-06-12

## 2021-06-12 RX ORDER — MORPHINE SULFATE 50 MG/1
4 CAPSULE, EXTENDED RELEASE ORAL ONCE
Refills: 0 | Status: DISCONTINUED | OUTPATIENT
Start: 2021-06-12 | End: 2021-06-12

## 2021-06-12 RX ADMIN — MORPHINE SULFATE 4 MILLIGRAM(S): 50 CAPSULE, EXTENDED RELEASE ORAL at 20:36

## 2021-06-12 NOTE — ED ADULT NURSE NOTE - CAS EDN DISCHARGE ASSESSMENT
-- Message is from the [facility]--    Reason for Call: Patient is calling to ask a couple of questions in regards to her Urinalysis she had with Dr. Cheryle Jaime. Please contact Patient to assist her.    Caller Information       Type Contact Phone    09/30/2019 10:19 AM Phone (Incoming) Riccardo Rick (Self) [phone] (W)          Alternative phone number: n/a    Turnaround time given to caller:   \"This message will be sent to [state Provider's name]. The clinical team will fulfill your request as soon as they review your message.\"    
Spoke with patient, sent bactrim to pharmacy
Alert and oriented to person, place and time

## 2021-06-12 NOTE — ED PROVIDER NOTE - OBJECTIVE STATEMENT
Pt h/o Afib on Eliquis, CVA, CHF p/w fall. Fell while leaving a restaurant. Immediate R leg pain, unable to stand. Denies LOC. Denies headache or neck pain. Given 50mcg Fentanyl by EMS for pain.

## 2021-06-12 NOTE — ED PROVIDER NOTE - NEUROLOGICAL, MLM
Alert and oriented, slightly slurred speech (at baseline per patient) otherwise no focal deficits, no motor or sensory deficits.

## 2021-06-12 NOTE — ED PROVIDER NOTE - LOWER EXTREMITY EXAM, RIGHT
Obvious deformity to R proximal femur with significantly limited ROM. +Tender. No skin tenting/open wounds. Able to range ankle and toes/CREPITUS/DEFORMITY/LIMITED ROM/TENDERNESS

## 2021-06-12 NOTE — ED PROVIDER NOTE - CLINICAL SUMMARY MEDICAL DECISION MAKING FREE TEXT BOX
Vicki Padilla MD - Attending Physician: Pt here with mechanical fall, R leg deformity c/w proximal femur/hip fracture. Pain control, pre-ops, Xray

## 2021-06-12 NOTE — ED PROVIDER NOTE - PROGRESS NOTE DETAILS
Anna Nath, resident MD: xray reveals comminuted intertrochanteric fracture of right hip. orthopedic surgery was consulted who will evaluated pt.

## 2021-06-12 NOTE — ED ADULT NURSE NOTE - NS ED NURSE REPORT GIVEN TO FT
Symptoms reviewed with patient who verbalized the following symptoms: no new symptoms and no worsening symptoms. Due to no new or worsening symptoms encounter was not routed to provider for escalation. Discussed follow-up appointments. If no appointment was previously scheduled, appointment scheduling offered: Yes; declines need for assistance. Has already been in contact w/PCP who referred pt to see urologist. Pt currently in waiting for return callback from urology office for scheduling  [facility] follow up appointment(s):   Future Appointments   Date Time Provider Chelsi Marmolejo   7/1/2020  3:00 PM RIZWAN Roberson CNP [facility]   7/27/2020  8:55 AM SCHEDULE, [facility] REMOTE TRANSMISSION [facility]     Non-[facility] follow up appointment(s):      Patient has following risk factors of: heart failure, diabetes, chronic kidney disease and age 80. CTN/ACM reviewed discharge instructions, medical action plan and red flags such as increased shortness of breath, increasing fever and signs of decompensation with patient who verbalized understanding. Discussed exposure protocols and quarantine with CDC Guidelines What to do if you are sick with coronavirus disease 2019.  Patient was given an opportunity for questions and concerns. The patient agrees to contact the [facility] exposure line [phone], local [facility] department [place] [facility]: ([phone]) and PCP office for questions related to their healthcare. CTN/ACM provided contact information for future needs. Reviewed and educated patient on any new and changed medications related to discharge diagnosis     Patient/family/caregiver given information for [facility] and agrees to enroll no  Patient's preferred e-mail: no email   Patient's preferred phone number: [phone]  Based on Loop alert triggers, patient will be contacted by nurse care manager for worsening symptoms.     Care Transition follow up in 10-14 days based on severity Cynthia, 7

## 2021-06-12 NOTE — ED ADULT NURSE NOTE - OBJECTIVE STATEMENT
88 y/o male pmh A.fib- on Eliquis, CVA and CHF presenting to ED by EMS c/o r. hip and leg pain s/p fall. pt states he was walking out of restaurant when he tripped and fell with immediate r. leg and hip pain with pt unable to get up on his own d/t the pain. pt normally ambulatory with cane at baseline. pt arrives with obvious r. hip fx/deformity noted. denies any numbness, tingling, dec sensation, or head injury/loc. pt administered 50mcg fentanyl by EMS in route to ED. labs and line obtained, pt pending xrays. safety and fall precautions maintained at all times, call bell at the bedside and within reach.

## 2021-06-12 NOTE — ED PROVIDER NOTE - CARE PLAN
Principal Discharge DX:	Closed displaced intertrochanteric fracture of right femur, initial encounter

## 2021-06-13 DIAGNOSIS — I50.9 HEART FAILURE, UNSPECIFIED: ICD-10-CM

## 2021-06-13 DIAGNOSIS — I10 ESSENTIAL (PRIMARY) HYPERTENSION: ICD-10-CM

## 2021-06-13 DIAGNOSIS — I48.91 UNSPECIFIED ATRIAL FIBRILLATION: ICD-10-CM

## 2021-06-13 DIAGNOSIS — S72.141A DISPLACED INTERTROCHANTERIC FRACTURE OF RIGHT FEMUR, INITIAL ENCOUNTER FOR CLOSED FRACTURE: ICD-10-CM

## 2021-06-13 DIAGNOSIS — I63.9 CEREBRAL INFARCTION, UNSPECIFIED: ICD-10-CM

## 2021-06-13 LAB
24R-OH-CALCIDIOL SERPL-MCNC: 21.8 NG/ML — LOW (ref 30–80)
ALBUMIN SERPL ELPH-MCNC: 3.5 G/DL — SIGNIFICANT CHANGE UP (ref 3.3–5)
ANION GAP SERPL CALC-SCNC: 13 MMOL/L — SIGNIFICANT CHANGE UP (ref 5–17)
ANION GAP SERPL CALC-SCNC: 14 MMOL/L — SIGNIFICANT CHANGE UP (ref 5–17)
APTT BLD: 31.1 SEC — SIGNIFICANT CHANGE UP (ref 27.5–35.5)
BLD GP AB SCN SERPL QL: NEGATIVE — SIGNIFICANT CHANGE UP
BUN SERPL-MCNC: 21 MG/DL — SIGNIFICANT CHANGE UP (ref 7–23)
BUN SERPL-MCNC: 23 MG/DL — SIGNIFICANT CHANGE UP (ref 7–23)
CALCIUM SERPL-MCNC: 8.7 MG/DL — SIGNIFICANT CHANGE UP (ref 8.4–10.5)
CALCIUM SERPL-MCNC: 9 MG/DL — SIGNIFICANT CHANGE UP (ref 8.4–10.5)
CHLORIDE SERPL-SCNC: 102 MMOL/L — SIGNIFICANT CHANGE UP (ref 96–108)
CHLORIDE SERPL-SCNC: 106 MMOL/L — SIGNIFICANT CHANGE UP (ref 96–108)
CO2 SERPL-SCNC: 24 MMOL/L — SIGNIFICANT CHANGE UP (ref 22–31)
CO2 SERPL-SCNC: 25 MMOL/L — SIGNIFICANT CHANGE UP (ref 22–31)
CREAT SERPL-MCNC: 0.89 MG/DL — SIGNIFICANT CHANGE UP (ref 0.5–1.3)
CREAT SERPL-MCNC: 0.95 MG/DL — SIGNIFICANT CHANGE UP (ref 0.5–1.3)
GLUCOSE SERPL-MCNC: 123 MG/DL — HIGH (ref 70–99)
GLUCOSE SERPL-MCNC: 134 MG/DL — HIGH (ref 70–99)
HCT VFR BLD CALC: 34.7 % — LOW (ref 39–50)
HCT VFR BLD CALC: 36.6 % — LOW (ref 39–50)
HGB BLD-MCNC: 11.1 G/DL — LOW (ref 13–17)
HGB BLD-MCNC: 11.9 G/DL — LOW (ref 13–17)
INR BLD: 1.15 RATIO — SIGNIFICANT CHANGE UP (ref 0.88–1.16)
MCHC RBC-ENTMCNC: 31.9 PG — SIGNIFICANT CHANGE UP (ref 27–34)
MCHC RBC-ENTMCNC: 32 GM/DL — SIGNIFICANT CHANGE UP (ref 32–36)
MCHC RBC-ENTMCNC: 32.5 GM/DL — SIGNIFICANT CHANGE UP (ref 32–36)
MCHC RBC-ENTMCNC: 33 PG — SIGNIFICANT CHANGE UP (ref 27–34)
MCV RBC AUTO: 101.4 FL — HIGH (ref 80–100)
MCV RBC AUTO: 99.7 FL — SIGNIFICANT CHANGE UP (ref 80–100)
NRBC # BLD: 0 /100 WBCS — SIGNIFICANT CHANGE UP (ref 0–0)
NRBC # BLD: 0 /100 WBCS — SIGNIFICANT CHANGE UP (ref 0–0)
PLATELET # BLD AUTO: 147 K/UL — LOW (ref 150–400)
PLATELET # BLD AUTO: 159 K/UL — SIGNIFICANT CHANGE UP (ref 150–400)
POTASSIUM SERPL-MCNC: 3.8 MMOL/L — SIGNIFICANT CHANGE UP (ref 3.5–5.3)
POTASSIUM SERPL-MCNC: 3.9 MMOL/L — SIGNIFICANT CHANGE UP (ref 3.5–5.3)
POTASSIUM SERPL-SCNC: 3.8 MMOL/L — SIGNIFICANT CHANGE UP (ref 3.5–5.3)
POTASSIUM SERPL-SCNC: 3.9 MMOL/L — SIGNIFICANT CHANGE UP (ref 3.5–5.3)
PROTHROM AB SERPL-ACNC: 13.7 SEC — HIGH (ref 10.6–13.6)
RBC # BLD: 3.48 M/UL — LOW (ref 4.2–5.8)
RBC # BLD: 3.61 M/UL — LOW (ref 4.2–5.8)
RBC # FLD: 13.2 % — SIGNIFICANT CHANGE UP (ref 10.3–14.5)
RBC # FLD: 13.3 % — SIGNIFICANT CHANGE UP (ref 10.3–14.5)
RH IG SCN BLD-IMP: POSITIVE — SIGNIFICANT CHANGE UP
SARS-COV-2 RNA SPEC QL NAA+PROBE: SIGNIFICANT CHANGE UP
SODIUM SERPL-SCNC: 141 MMOL/L — SIGNIFICANT CHANGE UP (ref 135–145)
SODIUM SERPL-SCNC: 143 MMOL/L — SIGNIFICANT CHANGE UP (ref 135–145)
TROPONIN T, HIGH SENSITIVITY RESULT: 26 NG/L — SIGNIFICANT CHANGE UP (ref 0–51)
WBC # BLD: 10.97 K/UL — HIGH (ref 3.8–10.5)
WBC # BLD: 15.09 K/UL — HIGH (ref 3.8–10.5)
WBC # FLD AUTO: 10.97 K/UL — HIGH (ref 3.8–10.5)
WBC # FLD AUTO: 15.09 K/UL — HIGH (ref 3.8–10.5)

## 2021-06-13 RX ORDER — CEFAZOLIN SODIUM 1 G
2000 VIAL (EA) INJECTION EVERY 8 HOURS
Refills: 0 | Status: COMPLETED | OUTPATIENT
Start: 2021-06-13 | End: 2021-06-14

## 2021-06-13 RX ORDER — OXYCODONE HYDROCHLORIDE 5 MG/1
5 TABLET ORAL EVERY 4 HOURS
Refills: 0 | Status: DISCONTINUED | OUTPATIENT
Start: 2021-06-13 | End: 2021-06-13

## 2021-06-13 RX ORDER — ASPIRIN/CALCIUM CARB/MAGNESIUM 324 MG
81 TABLET ORAL DAILY
Refills: 0 | Status: DISCONTINUED | OUTPATIENT
Start: 2021-06-13 | End: 2021-06-18

## 2021-06-13 RX ORDER — FUROSEMIDE 40 MG
40 TABLET ORAL DAILY
Refills: 0 | Status: DISCONTINUED | OUTPATIENT
Start: 2021-06-13 | End: 2021-06-13

## 2021-06-13 RX ORDER — APIXABAN 2.5 MG/1
5 TABLET, FILM COATED ORAL EVERY 12 HOURS
Refills: 0 | Status: DISCONTINUED | OUTPATIENT
Start: 2021-06-13 | End: 2021-06-13

## 2021-06-13 RX ORDER — OXYCODONE HYDROCHLORIDE 5 MG/1
2.5 TABLET ORAL EVERY 4 HOURS
Refills: 0 | Status: DISCONTINUED | OUTPATIENT
Start: 2021-06-13 | End: 2021-06-13

## 2021-06-13 RX ORDER — ACETAMINOPHEN 500 MG
975 TABLET ORAL EVERY 8 HOURS
Refills: 0 | Status: DISCONTINUED | OUTPATIENT
Start: 2021-06-13 | End: 2021-06-13

## 2021-06-13 RX ORDER — SODIUM CHLORIDE 9 MG/ML
1000 INJECTION INTRAMUSCULAR; INTRAVENOUS; SUBCUTANEOUS
Refills: 0 | Status: DISCONTINUED | OUTPATIENT
Start: 2021-06-13 | End: 2021-06-13

## 2021-06-13 RX ORDER — FENTANYL CITRATE 50 UG/ML
25 INJECTION INTRAVENOUS
Refills: 0 | Status: DISCONTINUED | OUTPATIENT
Start: 2021-06-13 | End: 2021-06-13

## 2021-06-13 RX ORDER — LOSARTAN POTASSIUM 100 MG/1
100 TABLET, FILM COATED ORAL DAILY
Refills: 0 | Status: DISCONTINUED | OUTPATIENT
Start: 2021-06-13 | End: 2021-06-18

## 2021-06-13 RX ORDER — AMLODIPINE BESYLATE 2.5 MG/1
5 TABLET ORAL DAILY
Refills: 0 | Status: DISCONTINUED | OUTPATIENT
Start: 2021-06-13 | End: 2021-06-18

## 2021-06-13 RX ORDER — FUROSEMIDE 40 MG
40 TABLET ORAL DAILY
Refills: 0 | Status: DISCONTINUED | OUTPATIENT
Start: 2021-06-13 | End: 2021-06-18

## 2021-06-13 RX ORDER — APIXABAN 2.5 MG/1
5 TABLET, FILM COATED ORAL
Refills: 0 | Status: DISCONTINUED | OUTPATIENT
Start: 2021-06-14 | End: 2021-06-18

## 2021-06-13 RX ORDER — POTASSIUM CHLORIDE 20 MEQ
20 PACKET (EA) ORAL DAILY
Refills: 0 | Status: DISCONTINUED | OUTPATIENT
Start: 2021-06-13 | End: 2021-06-13

## 2021-06-13 RX ORDER — METOPROLOL TARTRATE 50 MG
50 TABLET ORAL DAILY
Refills: 0 | Status: DISCONTINUED | OUTPATIENT
Start: 2021-06-13 | End: 2021-06-13

## 2021-06-13 RX ORDER — MAGNESIUM HYDROXIDE 400 MG/1
30 TABLET, CHEWABLE ORAL DAILY
Refills: 0 | Status: DISCONTINUED | OUTPATIENT
Start: 2021-06-13 | End: 2021-06-13

## 2021-06-13 RX ORDER — METOPROLOL TARTRATE 50 MG
50 TABLET ORAL DAILY
Refills: 0 | Status: DISCONTINUED | OUTPATIENT
Start: 2021-06-13 | End: 2021-06-18

## 2021-06-13 RX ORDER — OXYCODONE HYDROCHLORIDE 5 MG/1
5 TABLET ORAL EVERY 4 HOURS
Refills: 0 | Status: DISCONTINUED | OUTPATIENT
Start: 2021-06-13 | End: 2021-06-18

## 2021-06-13 RX ORDER — OXYCODONE HYDROCHLORIDE 5 MG/1
2.5 TABLET ORAL EVERY 4 HOURS
Refills: 0 | Status: DISCONTINUED | OUTPATIENT
Start: 2021-06-13 | End: 2021-06-18

## 2021-06-13 RX ORDER — LANOLIN ALCOHOL/MO/W.PET/CERES
3 CREAM (GRAM) TOPICAL AT BEDTIME
Refills: 0 | Status: DISCONTINUED | OUTPATIENT
Start: 2021-06-13 | End: 2021-06-13

## 2021-06-13 RX ORDER — ACETAMINOPHEN 500 MG
975 TABLET ORAL EVERY 8 HOURS
Refills: 0 | Status: DISCONTINUED | OUTPATIENT
Start: 2021-06-13 | End: 2021-06-18

## 2021-06-13 RX ORDER — LOSARTAN POTASSIUM 100 MG/1
100 TABLET, FILM COATED ORAL DAILY
Refills: 0 | Status: DISCONTINUED | OUTPATIENT
Start: 2021-06-13 | End: 2021-06-13

## 2021-06-13 RX ORDER — ENOXAPARIN SODIUM 100 MG/ML
40 INJECTION SUBCUTANEOUS EVERY 24 HOURS
Refills: 0 | Status: DISCONTINUED | OUTPATIENT
Start: 2021-06-13 | End: 2021-06-13

## 2021-06-13 RX ORDER — AMLODIPINE BESYLATE 2.5 MG/1
5 TABLET ORAL DAILY
Refills: 0 | Status: DISCONTINUED | OUTPATIENT
Start: 2021-06-13 | End: 2021-06-13

## 2021-06-13 RX ADMIN — Medication 975 MILLIGRAM(S): at 23:00

## 2021-06-13 RX ADMIN — OXYCODONE HYDROCHLORIDE 5 MILLIGRAM(S): 5 TABLET ORAL at 18:04

## 2021-06-13 RX ADMIN — OXYCODONE HYDROCHLORIDE 5 MILLIGRAM(S): 5 TABLET ORAL at 18:34

## 2021-06-13 RX ADMIN — OXYCODONE HYDROCHLORIDE 5 MILLIGRAM(S): 5 TABLET ORAL at 23:00

## 2021-06-13 RX ADMIN — Medication 975 MILLIGRAM(S): at 03:04

## 2021-06-13 RX ADMIN — OXYCODONE HYDROCHLORIDE 5 MILLIGRAM(S): 5 TABLET ORAL at 03:00

## 2021-06-13 RX ADMIN — Medication 40 MILLIEQUIVALENT(S): at 02:36

## 2021-06-13 RX ADMIN — LOSARTAN POTASSIUM 100 MILLIGRAM(S): 100 TABLET, FILM COATED ORAL at 03:05

## 2021-06-13 RX ADMIN — Medication 40 MILLIGRAM(S): at 03:04

## 2021-06-13 RX ADMIN — OXYCODONE HYDROCHLORIDE 5 MILLIGRAM(S): 5 TABLET ORAL at 22:23

## 2021-06-13 RX ADMIN — Medication 975 MILLIGRAM(S): at 22:23

## 2021-06-13 RX ADMIN — Medication 100 MILLIGRAM(S): at 18:04

## 2021-06-13 RX ADMIN — SODIUM CHLORIDE 75 MILLILITER(S): 9 INJECTION INTRAMUSCULAR; INTRAVENOUS; SUBCUTANEOUS at 03:05

## 2021-06-13 RX ADMIN — OXYCODONE HYDROCHLORIDE 5 MILLIGRAM(S): 5 TABLET ORAL at 02:36

## 2021-06-13 RX ADMIN — AMLODIPINE BESYLATE 5 MILLIGRAM(S): 2.5 TABLET ORAL at 03:04

## 2021-06-13 RX ADMIN — Medication 50 MILLIGRAM(S): at 03:04

## 2021-06-13 RX ADMIN — Medication 975 MILLIGRAM(S): at 03:34

## 2021-06-13 NOTE — H&P ADULT - HISTORY OF PRESENT ILLNESS
87y Male hx Afib on Eliquis, CVA, CHF presents s/p mech fall c/o severe R hip pain and inability to ambulate. He was walking out from a restaurant when he tripped and fell. Patient denies headstrike or LOC. Patient denies radiation of pain. Patient denies numbness/tingling/burning in the RLE. No other bone/joint complaints. Patient is a community ambulator at baseline with/without assistive devices.                    87y Male hx Afib on Eliquis, CVA, CHF presents s/p mech fall c/o severe R hip pain and inability to ambulate. He was walking out from a restaurant when he tripped and fell. Patient denies headstrike or LOC. Patient denies radiation of pain. Patient denies numbness/tingling/burning in the RLE. No other bone/joint complaints. Patient is a community ambulator at baseline with/without assistive devices. 87y Male hx Afib on Eliquis, CVA, CHF presents s/p mech fall c/o severe R hip pain and inability to ambulate. He was walking out from a restaurant when he tripped and fell. Patient denies headstrike or LOC. Patient denies radiation of pain. Patient denies numbness/tingling/burning in the RLE. No other bone/joint complaints. Patient is a community ambulator at baseline without assistive devices.

## 2021-06-13 NOTE — H&P ADULT - ASSESSMENT
87y Male with R subtrochanteric fracture  - Pain control  - NPO/IVF  - Aguiar catheter  - CBC/BMP/Coags/UA/T+S x2  - EKG/CXR  - Medical clearance  - Plan for OR for ORIF

## 2021-06-13 NOTE — H&P ADULT - NSHPLABSRESULTS_GEN_ALL_CORE
11.1   10.97 )-----------( 147      ( 13 Jun 2021 05:18 )             34.7     06-13    143  |  106  |  23  ----------------------------<  123<H>  3.8   |  24  |  0.89    Ca    8.7      13 Jun 2021 05:18    TPro  x   /  Alb  3.5  /  TBili  x   /  DBili  x   /  AST  x   /  ALT  x   /  AlkPhos  x   06-13    PT/INR - ( 13 Jun 2021 05:18 )   PT: 13.7 sec;   INR: 1.15 ratio         PTT - ( 13 Jun 2021 05:18 )  PTT:31.1 sec      Imaging:  XR demonstrating R subtroch fracture

## 2021-06-13 NOTE — CONSULT NOTE ADULT - SUBJECTIVE AND OBJECTIVE BOX
Patient is an 88 yo male h/o Afib on Eliquis, CVA, CHF p/w fall. Fell while leaving a restaurant. Immediate R leg pain, unable to stand. Denies LOC. Denies headache or neck pain. Given 50mcg Fentanyl by EMS for pain. Patient states he turned his head to look at something and tripped. Denies any syncope or LOC. Patient was brought to [facility] for further treatment where he was found to have a right hip fx. Patient is schedu;edfor an Open reduction and internal fixation of the right hip. Patient seen now resting comfortably. denies any CP or HER.       Patient is a 87y old  Male who presents with a chief complaint of     PAST MEDICAL & SURGICAL HISTORY:  Afib    Congestive heart failure (CHF)    CVA (cerebral vascular accident)    Hypertension, unspecified type    No significant past surgical history          MEDICATIONS  (STANDING):    MEDICATIONS  (PRN):    Social Hx:  Tobacco: neg  ETOH: neg  Drugs: neg    Family Hx:  As per my conversation with the patient, non contributory      ROS  CONSTITUTIONAL: No weakness, fevers or chills  EYES/ENT: No visual changes;  No vertigo or throat pain   NECK: No pain or stiffness  RESPIRATORY: No cough, wheezing, hemoptysis; No shortness of breath  CARDIOVASCULAR: No chest pain or palpitations  GASTROINTESTINAL: No abdominal or epigastric pain. No nausea, vomiting, or hematemesis; No diarrhea or constipation. No melena or hematochezia.  GENITOURINARY: No dysuria, frequency or hematuria  NEUROLOGICAL: No numbness or weakness  SKIN: No itching, burning, rashes, or lesions   All other review of systems is negative unless indicated above.    INTERVAL HPI/OVERNIGHT EVENTS:  T(C): 36.5 (06-13-21 @ 08:09), Max: 36.8 (06-12-21 @ 18:36)  HR: 59 (06-13-21 @ 08:09) (56 - 80)  BP: 176/80 (06-13-21 @ 08:09) (140/70 - 200/70)  RR: 18 (06-13-21 @ 08:09) (16 - 20)  SpO2: 99% (06-13-21 @ 08:09) (95% - 99%)  Wt(kg): --  I&O's Summary    12 Jun 2021 07:01  -  13 Jun 2021 07:00  --------------------------------------------------------  IN: 0 mL / OUT: 200 mL / NET: -200 mL    13 Jun 2021 07:01  -  13 Jun 2021 09:58  --------------------------------------------------------  IN: 180 mL / OUT: 750 mL / NET: -570 mL        PHYSICAL EXAM:  GENERAL: NAD, well-groomed, well-developed  HEAD:  Atraumatic, Normocephalic  EYES: EOMI, PERRLA, conjunctiva and sclera clear  ENMT: slurred speech after his CVA  NECK: Supple, No JVD, Normal thyroid  NERVOUS SYSTEM:  Alert & Oriented X3, Good concentration; RLE weakness  CHEST/LUNG: Clear to percussion bilaterally; No rales, rhonchi, wheezing, or rubs  HEART: Regular rate and rhythm; No murmurs, rubs, or gallops  ABDOMEN: Soft, Nontender, Nondistended; Bowel sounds present  EXTREMITIES:  2+ Peripheral Pulses, No clubbing, cyanosis, or edema  LYMPH: No lymphadenopathy noted  SKIN: No rashes or lesions        LABS:                        11.1   10.97 )-----------( 147      ( 13 Jun 2021 05:18 )             34.7     06-13    143  |  106  |  23  ----------------------------<  123<H>  3.8   |  24  |  0.89    Ca    8.7      13 Jun 2021 05:18    TPro  x   /  Alb  3.5  /  TBili  x   /  DBili  x   /  AST  x   /  ALT  x   /  AlkPhos  x   06-13    PT/INR - ( 13 Jun 2021 05:18 )   PT: 13.7 sec;   INR: 1.15 ratio         PTT - ( 13 Jun 2021 05:18 )  PTT:31.1 sec      EKG Afib at 56 BPM

## 2021-06-13 NOTE — PHYSICAL THERAPY INITIAL EVALUATION ADULT - ACTIVE RANGE OF MOTION EXAMINATION, REHAB EVAL
RLE ROM limited 2/2 post OP pain/bilateral upper extremity Active ROM was WFL (within functional limits)/Left LE Active ROM was WFL (within functional limits)

## 2021-06-13 NOTE — CONSULT NOTE ADULT - ASSESSMENT
86 yo male with a hx of a cerebrovascular accident in the past presents after a fall with a right hip fx. Patient is scheduled for an Open reduction and internal fixation of the right hip

## 2021-06-13 NOTE — CONSULT NOTE ADULT - PROBLEM SELECTOR RECOMMENDATION 9
No contraindication to scheduled procedure  Pain meds as needed  follow for oversedation   GI regime to prevent constipation  Patient is on AC for afib

## 2021-06-13 NOTE — PHYSICAL THERAPY INITIAL EVALUATION ADULT - ADDITIONAL COMMENTS
Pt lives in a pvt home has 1 step at entry +LHR, was independent w/ all ADLs and ambulation PTA, pt owns a RW, ambulated w/ it.

## 2021-06-13 NOTE — PRE-ANESTHESIA EVALUATION ADULT - NSANTHPMHFT_GEN_ALL_CORE
87M PMHx HTN, CVA w residual R leg weakness, CHF w reduced EF ( 25-30 on 2019 TTE), Afib on eliquis, s/p mechanical fall now for R hip ORIF

## 2021-06-13 NOTE — H&P ADULT - NSHPPHYSICALEXAM_GEN_ALL_CORE
T(C): 36.5 (06-13-21 @ 08:09), Max: 36.8 (06-12-21 @ 18:36)  HR: 59 (06-13-21 @ 08:09) (56 - 80)  BP: 176/80 (06-13-21 @ 08:09) (140/70 - 200/70)  RR: 18 (06-13-21 @ 08:09) (16 - 20)  SpO2: 99% (06-13-21 @ 08:09) (95% - 99%)  Wt(kg): --    PE   RLE:  Skin intact; No ecchymosis/soft tissue swelling  Compartments soft; + TTP about hip. No TTP to knee/leg/ankle/foot   ROM limited 2/2 pain   Unable to SLR; + Log Roll/Heel Strike  Motor intact GS/TA/FHL/EHL  SILT L2-S1  DP/PT pulses 2+    _LE/BUE:   No bony TTP; Good ROM w/o pain; Exam Unremarkable T(C): 36.5 (06-13-21 @ 08:09), Max: 36.8 (06-12-21 @ 18:36)  HR: 59 (06-13-21 @ 08:09) (56 - 80)  BP: 176/80 (06-13-21 @ 08:09) (140/70 - 200/70)  RR: 18 (06-13-21 @ 08:09) (16 - 20)  SpO2: 99% (06-13-21 @ 08:09) (95% - 99%)  Wt(kg): --    PE   RLE:  Skin intact; No ecchymosis/soft tissue swelling  Compartments soft; + TTP about hip. No TTP to knee/leg/ankle/foot   ROM limited 2/2 pain   Unable to SLR; + Log Roll/Heel Strike  Motor intact GS/TA/FHL/EHL  SILT L2-S1  DP/PT pulses 2+    LLE/BUE:   No bony TTP; Good ROM w/o pain; Exam Unremarkable

## 2021-06-13 NOTE — CONSULT NOTE ADULT - PROBLEM SELECTOR RECOMMENDATION 5
continue amlodipine and losartan  will continue to monitor BP and adjust meds as needed   Low sodium diet

## 2021-06-13 NOTE — PHYSICAL THERAPY INITIAL EVALUATION ADULT - PERTINENT HX OF CURRENT PROBLEM, REHAB EVAL
86 y/o M w/ PMH of Afib on Eliquis, CVA, CHF presents s/p mech fall c/o severe R hip pain and inability to ambulate. He was walking out from a restaurant when he tripped and fell. Pt was found to have R subtrochanteric Fx is now s/p R Femur IMN ORIF.

## 2021-06-13 NOTE — CONSULT NOTE ADULT - PROBLEM SELECTOR RECOMMENDATION 2
Patient currently rate controlled   Patient has been on toprol 50mg for rate control  continue current dosing  continue DOAC post op for AC

## 2021-06-13 NOTE — CONSULT NOTE ADULT - PROBLEM SELECTOR RECOMMENDATION 3
Patient is currently well compensated  continue lasix daily  will follow for signs of fluid overload  Patient has been following up regularly with his cardiologist Dr Ken

## 2021-06-13 NOTE — CHART NOTE - NSCHARTNOTEFT_GEN_A_CORE
Resting without complaints.  No Chest Pain, SOB, N/V.    T(C): 36.9 (06-13-21 @ 14:20), Max: 37.2 (06-13-21 @ 13:00)  HR: 74 (06-13-21 @ 14:20) (56 - 81)  BP: 148/61 (06-13-21 @ 14:20) (140/70 - 200/70)  RR: 16 (06-13-21 @ 14:20) (14 - 20)  SpO2: 99% (06-13-21 @ 14:20) (95% - 100%)      Exam:  Alert and [place], No Acute Distress  Card: +S1/S2, RRR  Pulm: CTAB  Laterality: R Hip  Dressing: Gauze and surgical film c/d/i  Calves soft, non-tender bilaterally  +PF/DF/EHL/FHL  SILT  + DP pulse    Xray: Intra-Op Fluoroscopy: S/P R Femur IM Nail ORIF, hardware in place and intact with no signs of mauricio hardware Fx                          11.9   15.09 )-----------( 159      ( 13 Jun 2021 13:14 )             36.6    06-13    141  |  102  |  21  ----------------------------<  134<H>  3.9   |  25  |  0.95    Ca    9.0      13 Jun 2021 13:14    TPro  x   /  Alb  3.5  /  TBili  x   /  DBili  x   /  AST  x   /  ALT  x   /  AlkPhos  x   06-13      A/P: 87y Male S/P R Femur IM Nail ORIF. VSS. NAD  -PT/OT-WBAT RLE  -F/U AM Labs tomorrow  -IS  -DVT PPx: Eliquis 5mg PO BID, ASA 81mg PO Daily and SCDs  -Pain Control  -Continue Current Tx  -Dispo planning pending PT recommendations    Mayo Olguin PA-C  Orthopedic Surgery Team  Team Pager #[phone]

## 2021-06-14 LAB
ANION GAP SERPL CALC-SCNC: 13 MMOL/L — SIGNIFICANT CHANGE UP (ref 5–17)
BASOPHILS # BLD AUTO: 0.02 K/UL — SIGNIFICANT CHANGE UP (ref 0–0.2)
BASOPHILS NFR BLD AUTO: 0.2 % — SIGNIFICANT CHANGE UP (ref 0–2)
BUN SERPL-MCNC: 21 MG/DL — SIGNIFICANT CHANGE UP (ref 7–23)
CALCIUM SERPL-MCNC: 8.3 MG/DL — LOW (ref 8.4–10.5)
CHLORIDE SERPL-SCNC: 103 MMOL/L — SIGNIFICANT CHANGE UP (ref 96–108)
CO2 SERPL-SCNC: 25 MMOL/L — SIGNIFICANT CHANGE UP (ref 22–31)
COVID-19 SPIKE DOMAIN AB INTERP: POSITIVE
COVID-19 SPIKE DOMAIN ANTIBODY RESULT: 214 U/ML — HIGH
CREAT SERPL-MCNC: 1.08 MG/DL — SIGNIFICANT CHANGE UP (ref 0.5–1.3)
EOSINOPHIL # BLD AUTO: 0.14 K/UL — SIGNIFICANT CHANGE UP (ref 0–0.5)
EOSINOPHIL NFR BLD AUTO: 1.2 % — SIGNIFICANT CHANGE UP (ref 0–6)
GLUCOSE SERPL-MCNC: 116 MG/DL — HIGH (ref 70–99)
HCT VFR BLD CALC: 31.9 % — LOW (ref 39–50)
HGB BLD-MCNC: 10.3 G/DL — LOW (ref 13–17)
IMM GRANULOCYTES NFR BLD AUTO: 0.7 % — SIGNIFICANT CHANGE UP (ref 0–1.5)
LYMPHOCYTES # BLD AUTO: 1.51 K/UL — SIGNIFICANT CHANGE UP (ref 1–3.3)
LYMPHOCYTES # BLD AUTO: 12.4 % — LOW (ref 13–44)
MCHC RBC-ENTMCNC: 32.3 GM/DL — SIGNIFICANT CHANGE UP (ref 32–36)
MCHC RBC-ENTMCNC: 32.4 PG — SIGNIFICANT CHANGE UP (ref 27–34)
MCV RBC AUTO: 100.3 FL — HIGH (ref 80–100)
MONOCYTES # BLD AUTO: 1.87 K/UL — HIGH (ref 0–0.9)
MONOCYTES NFR BLD AUTO: 15.4 % — HIGH (ref 2–14)
NEUTROPHILS # BLD AUTO: 8.5 K/UL — HIGH (ref 1.8–7.4)
NEUTROPHILS NFR BLD AUTO: 70.1 % — SIGNIFICANT CHANGE UP (ref 43–77)
NRBC # BLD: 0 /100 WBCS — SIGNIFICANT CHANGE UP (ref 0–0)
PLATELET # BLD AUTO: 132 K/UL — LOW (ref 150–400)
POTASSIUM SERPL-MCNC: 3.7 MMOL/L — SIGNIFICANT CHANGE UP (ref 3.5–5.3)
POTASSIUM SERPL-SCNC: 3.7 MMOL/L — SIGNIFICANT CHANGE UP (ref 3.5–5.3)
RBC # BLD: 3.18 M/UL — LOW (ref 4.2–5.8)
RBC # FLD: 13.5 % — SIGNIFICANT CHANGE UP (ref 10.3–14.5)
SARS-COV-2 IGG+IGM SERPL QL IA: 214 U/ML — HIGH
SARS-COV-2 IGG+IGM SERPL QL IA: POSITIVE
SODIUM SERPL-SCNC: 141 MMOL/L — SIGNIFICANT CHANGE UP (ref 135–145)
WBC # BLD: 12.13 K/UL — HIGH (ref 3.8–10.5)
WBC # FLD AUTO: 12.13 K/UL — HIGH (ref 3.8–10.5)

## 2021-06-14 RX ORDER — SENNA PLUS 8.6 MG/1
2 TABLET ORAL AT BEDTIME
Refills: 0 | Status: DISCONTINUED | OUTPATIENT
Start: 2021-06-14 | End: 2021-06-18

## 2021-06-14 RX ORDER — CHOLECALCIFEROL (VITAMIN D3) 125 MCG
2000 CAPSULE ORAL DAILY
Refills: 0 | Status: DISCONTINUED | OUTPATIENT
Start: 2021-06-14 | End: 2021-06-18

## 2021-06-14 RX ORDER — POLYETHYLENE GLYCOL 3350 17 G/17G
17 POWDER, FOR SOLUTION ORAL DAILY
Refills: 0 | Status: DISCONTINUED | OUTPATIENT
Start: 2021-06-14 | End: 2021-06-18

## 2021-06-14 RX ADMIN — Medication 50 MILLIGRAM(S): at 08:22

## 2021-06-14 RX ADMIN — AMLODIPINE BESYLATE 5 MILLIGRAM(S): 2.5 TABLET ORAL at 08:21

## 2021-06-14 RX ADMIN — APIXABAN 5 MILLIGRAM(S): 2.5 TABLET, FILM COATED ORAL at 18:05

## 2021-06-14 RX ADMIN — Medication 100 MILLIGRAM(S): at 01:55

## 2021-06-14 RX ADMIN — APIXABAN 5 MILLIGRAM(S): 2.5 TABLET, FILM COATED ORAL at 08:22

## 2021-06-14 RX ADMIN — Medication 975 MILLIGRAM(S): at 15:15

## 2021-06-14 RX ADMIN — OXYCODONE HYDROCHLORIDE 5 MILLIGRAM(S): 5 TABLET ORAL at 04:30

## 2021-06-14 RX ADMIN — OXYCODONE HYDROCHLORIDE 5 MILLIGRAM(S): 5 TABLET ORAL at 03:59

## 2021-06-14 RX ADMIN — Medication 2000 UNIT(S): at 13:03

## 2021-06-14 RX ADMIN — Medication 975 MILLIGRAM(S): at 07:21

## 2021-06-14 RX ADMIN — OXYCODONE HYDROCHLORIDE 5 MILLIGRAM(S): 5 TABLET ORAL at 09:26

## 2021-06-14 RX ADMIN — SENNA PLUS 2 TABLET(S): 8.6 TABLET ORAL at 23:09

## 2021-06-14 RX ADMIN — OXYCODONE HYDROCHLORIDE 5 MILLIGRAM(S): 5 TABLET ORAL at 10:00

## 2021-06-14 RX ADMIN — LOSARTAN POTASSIUM 100 MILLIGRAM(S): 100 TABLET, FILM COATED ORAL at 08:22

## 2021-06-14 RX ADMIN — Medication 975 MILLIGRAM(S): at 14:56

## 2021-06-14 RX ADMIN — Medication 975 MILLIGRAM(S): at 23:09

## 2021-06-14 RX ADMIN — Medication 81 MILLIGRAM(S): at 12:58

## 2021-06-14 RX ADMIN — Medication 40 MILLIGRAM(S): at 08:22

## 2021-06-14 RX ADMIN — Medication 975 MILLIGRAM(S): at 23:39

## 2021-06-14 NOTE — PROVIDER CONTACT NOTE (OTHER) - ASSESSMENT
pt has one access which is the central line
10/10 pain and pt has elevated bp 200/70 manually- pt denied SOB denied Chest pain- asymptomatic-

## 2021-06-14 NOTE — PROVIDER CONTACT NOTE (OTHER) - ACTION/TREATMENT ORDERED:
MD stated Rn can draw bloods for coags from the central line
MD stated that he will order pt's home meds which he takes antihypertensives for Hx of HTN. will recheck bp after administration of oxycodone.

## 2021-06-15 ENCOUNTER — TRANSCRIPTION ENCOUNTER (OUTPATIENT)
Age: 86
End: 2021-06-15

## 2021-06-15 DIAGNOSIS — N17.9 ACUTE KIDNEY FAILURE, UNSPECIFIED: ICD-10-CM

## 2021-06-15 LAB
ANION GAP SERPL CALC-SCNC: 16 MMOL/L — SIGNIFICANT CHANGE UP (ref 5–17)
BUN SERPL-MCNC: 31 MG/DL — HIGH (ref 7–23)
CALCIUM SERPL-MCNC: 8.9 MG/DL — SIGNIFICANT CHANGE UP (ref 8.4–10.5)
CHLORIDE SERPL-SCNC: 99 MMOL/L — SIGNIFICANT CHANGE UP (ref 96–108)
CO2 SERPL-SCNC: 24 MMOL/L — SIGNIFICANT CHANGE UP (ref 22–31)
CREAT SERPL-MCNC: 1.55 MG/DL — HIGH (ref 0.5–1.3)
GLUCOSE SERPL-MCNC: 143 MG/DL — HIGH (ref 70–99)
HCT VFR BLD CALC: 28.9 % — LOW (ref 39–50)
HGB BLD-MCNC: 9.3 G/DL — LOW (ref 13–17)
MCHC RBC-ENTMCNC: 32.2 GM/DL — SIGNIFICANT CHANGE UP (ref 32–36)
MCHC RBC-ENTMCNC: 32.5 PG — SIGNIFICANT CHANGE UP (ref 27–34)
MCV RBC AUTO: 101 FL — HIGH (ref 80–100)
NRBC # BLD: 0 /100 WBCS — SIGNIFICANT CHANGE UP (ref 0–0)
PLATELET # BLD AUTO: 154 K/UL — SIGNIFICANT CHANGE UP (ref 150–400)
POTASSIUM SERPL-MCNC: 3.9 MMOL/L — SIGNIFICANT CHANGE UP (ref 3.5–5.3)
POTASSIUM SERPL-SCNC: 3.9 MMOL/L — SIGNIFICANT CHANGE UP (ref 3.5–5.3)
RBC # BLD: 2.86 M/UL — LOW (ref 4.2–5.8)
RBC # FLD: 13.5 % — SIGNIFICANT CHANGE UP (ref 10.3–14.5)
SARS-COV-2 RNA SPEC QL NAA+PROBE: SIGNIFICANT CHANGE UP
SODIUM SERPL-SCNC: 139 MMOL/L — SIGNIFICANT CHANGE UP (ref 135–145)
WBC # BLD: 14.32 K/UL — HIGH (ref 3.8–10.5)
WBC # FLD AUTO: 14.32 K/UL — HIGH (ref 3.8–10.5)

## 2021-06-15 RX ORDER — ACETAMINOPHEN 500 MG
3 TABLET ORAL
Qty: 0 | Refills: 0 | DISCHARGE
Start: 2021-06-15

## 2021-06-15 RX ORDER — SENNA PLUS 8.6 MG/1
2 TABLET ORAL
Qty: 0 | Refills: 0 | DISCHARGE
Start: 2021-06-15

## 2021-06-15 RX ORDER — OXYCODONE HYDROCHLORIDE 5 MG/1
1 TABLET ORAL
Qty: 0 | Refills: 0 | DISCHARGE
Start: 2021-06-15

## 2021-06-15 RX ADMIN — OXYCODONE HYDROCHLORIDE 5 MILLIGRAM(S): 5 TABLET ORAL at 14:00

## 2021-06-15 RX ADMIN — Medication 975 MILLIGRAM(S): at 00:16

## 2021-06-15 RX ADMIN — OXYCODONE HYDROCHLORIDE 5 MILLIGRAM(S): 5 TABLET ORAL at 05:45

## 2021-06-15 RX ADMIN — APIXABAN 5 MILLIGRAM(S): 2.5 TABLET, FILM COATED ORAL at 17:02

## 2021-06-15 RX ADMIN — OXYCODONE HYDROCHLORIDE 5 MILLIGRAM(S): 5 TABLET ORAL at 05:12

## 2021-06-15 RX ADMIN — AMLODIPINE BESYLATE 5 MILLIGRAM(S): 2.5 TABLET ORAL at 05:12

## 2021-06-15 RX ADMIN — Medication 975 MILLIGRAM(S): at 13:30

## 2021-06-15 RX ADMIN — Medication 81 MILLIGRAM(S): at 13:31

## 2021-06-15 RX ADMIN — Medication 975 MILLIGRAM(S): at 05:12

## 2021-06-15 RX ADMIN — Medication 50 MILLIGRAM(S): at 05:12

## 2021-06-15 RX ADMIN — APIXABAN 5 MILLIGRAM(S): 2.5 TABLET, FILM COATED ORAL at 05:13

## 2021-06-15 RX ADMIN — SENNA PLUS 2 TABLET(S): 8.6 TABLET ORAL at 23:46

## 2021-06-15 RX ADMIN — POLYETHYLENE GLYCOL 3350 17 GRAM(S): 17 POWDER, FOR SOLUTION ORAL at 05:13

## 2021-06-15 RX ADMIN — LOSARTAN POTASSIUM 100 MILLIGRAM(S): 100 TABLET, FILM COATED ORAL at 05:12

## 2021-06-15 RX ADMIN — Medication 975 MILLIGRAM(S): at 14:00

## 2021-06-15 RX ADMIN — Medication 975 MILLIGRAM(S): at 05:45

## 2021-06-15 RX ADMIN — Medication 975 MILLIGRAM(S): at 23:46

## 2021-06-15 RX ADMIN — OXYCODONE HYDROCHLORIDE 5 MILLIGRAM(S): 5 TABLET ORAL at 13:30

## 2021-06-15 RX ADMIN — Medication 40 MILLIGRAM(S): at 05:13

## 2021-06-15 RX ADMIN — Medication 2000 UNIT(S): at 13:31

## 2021-06-15 NOTE — DISCHARGE NOTE PROVIDER - HOSPITAL COURSE
History of Present Illness:   87y Male hx Afib on Eliquis, CVA, CHF presents s/p mech fall c/o severe R hip pain and inability to ambulate. He was walking out from a restaurant when he tripped and fell. Patient denies headstrike or LOC. Patient denies radiation of pain. Patient denies numbness/tingling/burning in the RLE. No other bone/joint complaints. Patient is a community ambulator at baseline without assistive devices.        Hospital Course:  Patient underwent right subtrochanteric intramedullary nailing without complications. Evaluated and treated by physical therapy whom recommended  rehab for discharge disposition. Discharged to rehab when bed available.    History of Present Illness:   87y Male hx Afib on Eliquis, CVA, CHF presents s/p mech fall c/o severe R hip pain and inability to ambulate. He was walking out from a restaurant when he tripped and fell. Patient denies headstrike or LOC. Patient denies radiation of pain. Patient denies numbness/tingling/burning in the RLE. No other bone/joint complaints. Patient is a community ambulator at baseline without assistive devices.        Hospital Course:  Patient underwent right subtrochanteric intramedullary nailing without complications. Evaluated and treated by physical therapy whom recommended  rehab for discharge disposition. On 6/17/21 transfused 1 unit of packed red blood cells for acute anemia postop secondary to operative blood loss.   Discharged to rehab when bed available.

## 2021-06-15 NOTE — DISCHARGE NOTE PROVIDER - CARE PROVIDER_API CALL
Nabil Bejarano)  Orthopaedic Surgery  [address] [place] [address], [address]  [place] [address]  Phone: [phone]  Fax: [phone]  Follow Up Time:

## 2021-06-15 NOTE — DISCHARGE NOTE PROVIDER - NSDCFUADDAPPT_GEN_ALL_CORE_FT
It is recommended you follow up with your PCP within 4 weeks to discuss recent surgery/general checkup/possible medication adjustment.

## 2021-06-15 NOTE — DISCHARGE NOTE PROVIDER - NSDCFUADDINST_GEN_ALL_CORE_FT
Follow-up with Dr. Bejarano upon discharge from rehab- please call to make appointment.  Activity: weight bearing as tolerated on right leg.  DVT ppx-Eliquis 5mg po twice daily, Aspirin 81mg oral daily.  Dressing: Keep clean and dry until POD#14 when may remove along with staples, if applicable and   apply steri strips.   Follow-up with Dr. Bejarano upon discharge from rehab- please call to make appointment.  Activity: weight bearing as tolerated on right leg.  DVT ppx- Eliquis 5mg po twice daily, Aspirin 81mg oral daily.  Dressing: Keep clean and dry until POD#14 when may remove along with staples, if applicable and   apply steri strips.

## 2021-06-15 NOTE — DISCHARGE NOTE PROVIDER - NSDCMRMEDTOKEN_GEN_ALL_CORE_FT
acetaminophen 325 mg oral tablet: 3 tabs orally every 6 hours X 5 days, then every 6 hours as needed for pain  apixaban 5 mg oral tablet: 1 tab(s) orally 2 times a day  aspirin 81 mg oral tablet: 1 tab(s) orally once a day  furosemide 40 mg oral tablet: 1 tab(s) orally once a day  irbesartan 300 mg oral tablet: 1 tab(s) orally once a day  metoprolol succinate 50 mg oral tablet, extended release: 1 tab(s) orally once a day  Norvasc 5 mg oral tablet: 1 tab(s) orally once a day  oxyCODONE 5 mg oral tablet: Half to1 tab orally every 6 hours, As needed for Moderate to Severe Pain  potassium chloride 20 mEq oral tablet, extended release: 1 tab(s) orally once a day  senna oral tablet: 2 tab(s) orally once a day (at bedtime)   acetaminophen 325 mg oral tablet: 3 tabs orally every 6 hours X 5 days, then every 6 hours as needed for pain  apixaban 5 mg oral tablet: 1 tab(s) orally 2 times a day  aspirin 81 mg oral tablet: 1 tab(s) orally once a day  furosemide 40 mg oral tablet: 1 tab(s) orally once a day  irbesartan 300 mg oral tablet: 1 tab(s) orally once a day  metoprolol succinate 50 mg oral tablet, extended release: 1 tab(s) orally once a day  Norvasc 5 mg oral tablet: 1 tab(s) orally once a day  oxyCODONE 5 mg oral tablet: Half to1 tab orally every 6 hours, As needed for Moderate to Severe Pain  polyethylene glycol 3350 oral powder for reconstitution: 17 gram(s) orally once a day, As needed, Constipation  potassium chloride 20 mEq oral tablet, extended release: 1 tab(s) orally once a day  senna oral tablet: 2 tab(s) orally once a day (at bedtime)

## 2021-06-15 NOTE — DISCHARGE NOTE PROVIDER - NSDCCPTREATMENT_GEN_ALL_CORE_FT
PRINCIPAL PROCEDURE  Procedure: Subtrochanteric intramedullary nailing of right femur  Findings and Treatment:

## 2021-06-16 LAB
ANION GAP SERPL CALC-SCNC: 11 MMOL/L — SIGNIFICANT CHANGE UP (ref 5–17)
ANION GAP SERPL CALC-SCNC: 19 MMOL/L — HIGH (ref 5–17)
BUN SERPL-MCNC: 39 MG/DL — HIGH (ref 7–23)
BUN SERPL-MCNC: 40 MG/DL — HIGH (ref 7–23)
CALCIUM SERPL-MCNC: 8.8 MG/DL — SIGNIFICANT CHANGE UP (ref 8.4–10.5)
CALCIUM SERPL-MCNC: 8.8 MG/DL — SIGNIFICANT CHANGE UP (ref 8.4–10.5)
CHLORIDE SERPL-SCNC: 100 MMOL/L — SIGNIFICANT CHANGE UP (ref 96–108)
CHLORIDE SERPL-SCNC: 100 MMOL/L — SIGNIFICANT CHANGE UP (ref 96–108)
CO2 SERPL-SCNC: 21 MMOL/L — LOW (ref 22–31)
CO2 SERPL-SCNC: 27 MMOL/L — SIGNIFICANT CHANGE UP (ref 22–31)
CREAT SERPL-MCNC: 1.59 MG/DL — HIGH (ref 0.5–1.3)
CREAT SERPL-MCNC: 1.6 MG/DL — HIGH (ref 0.5–1.3)
GLUCOSE SERPL-MCNC: 108 MG/DL — HIGH (ref 70–99)
GLUCOSE SERPL-MCNC: 129 MG/DL — HIGH (ref 70–99)
HCT VFR BLD CALC: 26 % — LOW (ref 39–50)
HGB BLD-MCNC: 8.4 G/DL — LOW (ref 13–17)
MCHC RBC-ENTMCNC: 32.3 GM/DL — SIGNIFICANT CHANGE UP (ref 32–36)
MCHC RBC-ENTMCNC: 32.8 PG — SIGNIFICANT CHANGE UP (ref 27–34)
MCV RBC AUTO: 101.6 FL — HIGH (ref 80–100)
NRBC # BLD: 0 /100 WBCS — SIGNIFICANT CHANGE UP (ref 0–0)
PLATELET # BLD AUTO: 147 K/UL — LOW (ref 150–400)
POTASSIUM SERPL-MCNC: 3.7 MMOL/L — SIGNIFICANT CHANGE UP (ref 3.5–5.3)
POTASSIUM SERPL-MCNC: 3.9 MMOL/L — SIGNIFICANT CHANGE UP (ref 3.5–5.3)
POTASSIUM SERPL-SCNC: 3.7 MMOL/L — SIGNIFICANT CHANGE UP (ref 3.5–5.3)
POTASSIUM SERPL-SCNC: 3.9 MMOL/L — SIGNIFICANT CHANGE UP (ref 3.5–5.3)
RBC # BLD: 2.56 M/UL — LOW (ref 4.2–5.8)
RBC # FLD: 13.4 % — SIGNIFICANT CHANGE UP (ref 10.3–14.5)
SODIUM SERPL-SCNC: 138 MMOL/L — SIGNIFICANT CHANGE UP (ref 135–145)
SODIUM SERPL-SCNC: 140 MMOL/L — SIGNIFICANT CHANGE UP (ref 135–145)
WBC # BLD: 11.89 K/UL — HIGH (ref 3.8–10.5)
WBC # FLD AUTO: 11.89 K/UL — HIGH (ref 3.8–10.5)

## 2021-06-16 RX ORDER — CHOLECALCIFEROL (VITAMIN D3) 125 MCG
2000 CAPSULE ORAL DAILY
Refills: 0 | Status: DISCONTINUED | OUTPATIENT
Start: 2021-06-16 | End: 2021-06-16

## 2021-06-16 RX ORDER — SODIUM CHLORIDE 9 MG/ML
1000 INJECTION INTRAMUSCULAR; INTRAVENOUS; SUBCUTANEOUS
Refills: 0 | Status: DISCONTINUED | OUTPATIENT
Start: 2021-06-16 | End: 2021-06-16

## 2021-06-16 RX ORDER — SODIUM CHLORIDE 9 MG/ML
1000 INJECTION, SOLUTION INTRAVENOUS
Refills: 0 | Status: DISCONTINUED | OUTPATIENT
Start: 2021-06-16 | End: 2021-06-18

## 2021-06-16 RX ADMIN — Medication 975 MILLIGRAM(S): at 21:29

## 2021-06-16 RX ADMIN — Medication 50 MILLIGRAM(S): at 05:25

## 2021-06-16 RX ADMIN — AMLODIPINE BESYLATE 5 MILLIGRAM(S): 2.5 TABLET ORAL at 05:25

## 2021-06-16 RX ADMIN — OXYCODONE HYDROCHLORIDE 5 MILLIGRAM(S): 5 TABLET ORAL at 20:20

## 2021-06-16 RX ADMIN — LOSARTAN POTASSIUM 100 MILLIGRAM(S): 100 TABLET, FILM COATED ORAL at 05:25

## 2021-06-16 RX ADMIN — OXYCODONE HYDROCHLORIDE 5 MILLIGRAM(S): 5 TABLET ORAL at 09:00

## 2021-06-16 RX ADMIN — OXYCODONE HYDROCHLORIDE 5 MILLIGRAM(S): 5 TABLET ORAL at 19:51

## 2021-06-16 RX ADMIN — SODIUM CHLORIDE 80 MILLILITER(S): 9 INJECTION, SOLUTION INTRAVENOUS at 12:41

## 2021-06-16 RX ADMIN — SODIUM CHLORIDE 80 MILLILITER(S): 9 INJECTION, SOLUTION INTRAVENOUS at 10:23

## 2021-06-16 RX ADMIN — APIXABAN 5 MILLIGRAM(S): 2.5 TABLET, FILM COATED ORAL at 18:34

## 2021-06-16 RX ADMIN — SENNA PLUS 2 TABLET(S): 8.6 TABLET ORAL at 21:02

## 2021-06-16 RX ADMIN — APIXABAN 5 MILLIGRAM(S): 2.5 TABLET, FILM COATED ORAL at 05:26

## 2021-06-16 RX ADMIN — OXYCODONE HYDROCHLORIDE 5 MILLIGRAM(S): 5 TABLET ORAL at 08:37

## 2021-06-16 RX ADMIN — Medication 40 MILLIGRAM(S): at 05:26

## 2021-06-16 RX ADMIN — Medication 2000 UNIT(S): at 12:16

## 2021-06-16 RX ADMIN — Medication 10 MILLIGRAM(S): at 05:28

## 2021-06-16 RX ADMIN — Medication 975 MILLIGRAM(S): at 05:55

## 2021-06-16 RX ADMIN — Medication 975 MILLIGRAM(S): at 05:25

## 2021-06-16 RX ADMIN — SODIUM CHLORIDE 80 MILLILITER(S): 9 INJECTION, SOLUTION INTRAVENOUS at 21:03

## 2021-06-16 RX ADMIN — Medication 81 MILLIGRAM(S): at 12:16

## 2021-06-16 RX ADMIN — Medication 975 MILLIGRAM(S): at 21:02

## 2021-06-17 LAB
ANION GAP SERPL CALC-SCNC: 12 MMOL/L — SIGNIFICANT CHANGE UP (ref 5–17)
BLD GP AB SCN SERPL QL: NEGATIVE — SIGNIFICANT CHANGE UP
BUN SERPL-MCNC: 42 MG/DL — HIGH (ref 7–23)
CALCIUM SERPL-MCNC: 8.4 MG/DL — SIGNIFICANT CHANGE UP (ref 8.4–10.5)
CHLORIDE SERPL-SCNC: 100 MMOL/L — SIGNIFICANT CHANGE UP (ref 96–108)
CO2 SERPL-SCNC: 26 MMOL/L — SIGNIFICANT CHANGE UP (ref 22–31)
CREAT SERPL-MCNC: 1.5 MG/DL — HIGH (ref 0.5–1.3)
GLUCOSE SERPL-MCNC: 140 MG/DL — HIGH (ref 70–99)
HCT VFR BLD CALC: 21.7 % — LOW (ref 39–50)
HGB BLD-MCNC: 7.1 G/DL — LOW (ref 13–17)
MCHC RBC-ENTMCNC: 32.1 PG — SIGNIFICANT CHANGE UP (ref 27–34)
MCHC RBC-ENTMCNC: 32.7 GM/DL — SIGNIFICANT CHANGE UP (ref 32–36)
MCV RBC AUTO: 98.2 FL — SIGNIFICANT CHANGE UP (ref 80–100)
NRBC # BLD: 0 /100 WBCS — SIGNIFICANT CHANGE UP (ref 0–0)
PLATELET # BLD AUTO: 156 K/UL — SIGNIFICANT CHANGE UP (ref 150–400)
POTASSIUM SERPL-MCNC: 3.9 MMOL/L — SIGNIFICANT CHANGE UP (ref 3.5–5.3)
POTASSIUM SERPL-SCNC: 3.9 MMOL/L — SIGNIFICANT CHANGE UP (ref 3.5–5.3)
RBC # BLD: 2.21 M/UL — LOW (ref 4.2–5.8)
RBC # FLD: 13.4 % — SIGNIFICANT CHANGE UP (ref 10.3–14.5)
RH IG SCN BLD-IMP: POSITIVE — SIGNIFICANT CHANGE UP
SODIUM SERPL-SCNC: 138 MMOL/L — SIGNIFICANT CHANGE UP (ref 135–145)
WBC # BLD: 10.4 K/UL — SIGNIFICANT CHANGE UP (ref 3.8–10.5)
WBC # FLD AUTO: 10.4 K/UL — SIGNIFICANT CHANGE UP (ref 3.8–10.5)

## 2021-06-17 RX ADMIN — Medication 975 MILLIGRAM(S): at 06:04

## 2021-06-17 RX ADMIN — APIXABAN 5 MILLIGRAM(S): 2.5 TABLET, FILM COATED ORAL at 18:34

## 2021-06-17 RX ADMIN — Medication 81 MILLIGRAM(S): at 12:22

## 2021-06-17 RX ADMIN — Medication 975 MILLIGRAM(S): at 14:10

## 2021-06-17 RX ADMIN — APIXABAN 5 MILLIGRAM(S): 2.5 TABLET, FILM COATED ORAL at 06:03

## 2021-06-17 RX ADMIN — LOSARTAN POTASSIUM 100 MILLIGRAM(S): 100 TABLET, FILM COATED ORAL at 06:03

## 2021-06-17 RX ADMIN — Medication 975 MILLIGRAM(S): at 14:40

## 2021-06-17 RX ADMIN — SODIUM CHLORIDE 80 MILLILITER(S): 9 INJECTION, SOLUTION INTRAVENOUS at 22:12

## 2021-06-17 RX ADMIN — AMLODIPINE BESYLATE 5 MILLIGRAM(S): 2.5 TABLET ORAL at 06:03

## 2021-06-17 RX ADMIN — Medication 975 MILLIGRAM(S): at 06:35

## 2021-06-17 RX ADMIN — Medication 50 MILLIGRAM(S): at 06:03

## 2021-06-17 RX ADMIN — Medication 975 MILLIGRAM(S): at 22:12

## 2021-06-17 RX ADMIN — Medication 975 MILLIGRAM(S): at 22:40

## 2021-06-17 RX ADMIN — Medication 2000 UNIT(S): at 12:22

## 2021-06-17 RX ADMIN — Medication 40 MILLIGRAM(S): at 06:03

## 2021-06-18 ENCOUNTER — TRANSCRIPTION ENCOUNTER (OUTPATIENT)
Age: 86
End: 2021-06-18

## 2021-06-18 VITALS
SYSTOLIC BLOOD PRESSURE: 155 MMHG | RESPIRATION RATE: 17 BRPM | OXYGEN SATURATION: 93 % | DIASTOLIC BLOOD PRESSURE: 69 MMHG | TEMPERATURE: 98 F | HEART RATE: 77 BPM

## 2021-06-18 LAB
ANION GAP SERPL CALC-SCNC: 12 MMOL/L — SIGNIFICANT CHANGE UP (ref 5–17)
BUN SERPL-MCNC: 41 MG/DL — HIGH (ref 7–23)
CALCIUM SERPL-MCNC: 8.6 MG/DL — SIGNIFICANT CHANGE UP (ref 8.4–10.5)
CHLORIDE SERPL-SCNC: 103 MMOL/L — SIGNIFICANT CHANGE UP (ref 96–108)
CO2 SERPL-SCNC: 26 MMOL/L — SIGNIFICANT CHANGE UP (ref 22–31)
CREAT SERPL-MCNC: 1.3 MG/DL — SIGNIFICANT CHANGE UP (ref 0.5–1.3)
GLUCOSE SERPL-MCNC: 103 MG/DL — HIGH (ref 70–99)
HCT VFR BLD CALC: 25.8 % — LOW (ref 39–50)
HGB BLD-MCNC: 8.6 G/DL — LOW (ref 13–17)
MCHC RBC-ENTMCNC: 32.6 PG — SIGNIFICANT CHANGE UP (ref 27–34)
MCHC RBC-ENTMCNC: 33.3 GM/DL — SIGNIFICANT CHANGE UP (ref 32–36)
MCV RBC AUTO: 97.7 FL — SIGNIFICANT CHANGE UP (ref 80–100)
NRBC # BLD: 0 /100 WBCS — SIGNIFICANT CHANGE UP (ref 0–0)
PLATELET # BLD AUTO: 183 K/UL — SIGNIFICANT CHANGE UP (ref 150–400)
POTASSIUM SERPL-MCNC: 4.1 MMOL/L — SIGNIFICANT CHANGE UP (ref 3.5–5.3)
POTASSIUM SERPL-SCNC: 4.1 MMOL/L — SIGNIFICANT CHANGE UP (ref 3.5–5.3)
RBC # BLD: 2.64 M/UL — LOW (ref 4.2–5.8)
RBC # FLD: 13.8 % — SIGNIFICANT CHANGE UP (ref 10.3–14.5)
SODIUM SERPL-SCNC: 141 MMOL/L — SIGNIFICANT CHANGE UP (ref 135–145)
WBC # BLD: 10.06 K/UL — SIGNIFICANT CHANGE UP (ref 3.8–10.5)
WBC # FLD AUTO: 10.06 K/UL — SIGNIFICANT CHANGE UP (ref 3.8–10.5)

## 2021-06-18 PROCEDURE — 99285 EMERGENCY DEPT VISIT HI MDM: CPT | Mod: 25

## 2021-06-18 PROCEDURE — 86850 RBC ANTIBODY SCREEN: CPT

## 2021-06-18 PROCEDURE — 86901 BLOOD TYPING SEROLOGIC RH(D): CPT

## 2021-06-18 PROCEDURE — 80048 BASIC METABOLIC PNL TOTAL CA: CPT

## 2021-06-18 PROCEDURE — 86985 SPLIT BLOOD OR PRODUCTS: CPT

## 2021-06-18 PROCEDURE — 97162 PT EVAL MOD COMPLEX 30 MIN: CPT

## 2021-06-18 PROCEDURE — 85730 THROMBOPLASTIN TIME PARTIAL: CPT

## 2021-06-18 PROCEDURE — P9011: CPT

## 2021-06-18 PROCEDURE — 36430 TRANSFUSION BLD/BLD COMPNT: CPT

## 2021-06-18 PROCEDURE — C1713: CPT

## 2021-06-18 PROCEDURE — 86900 BLOOD TYPING SEROLOGIC ABO: CPT

## 2021-06-18 PROCEDURE — C1889: CPT

## 2021-06-18 PROCEDURE — U0003: CPT

## 2021-06-18 PROCEDURE — U0005: CPT

## 2021-06-18 PROCEDURE — 71045 X-RAY EXAM CHEST 1 VIEW: CPT

## 2021-06-18 PROCEDURE — 76000 FLUOROSCOPY <1 HR PHYS/QHP: CPT

## 2021-06-18 PROCEDURE — 82040 ASSAY OF SERUM ALBUMIN: CPT

## 2021-06-18 PROCEDURE — 85027 COMPLETE CBC AUTOMATED: CPT

## 2021-06-18 PROCEDURE — 97530 THERAPEUTIC ACTIVITIES: CPT

## 2021-06-18 PROCEDURE — 80053 COMPREHEN METABOLIC PANEL: CPT

## 2021-06-18 PROCEDURE — C1769: CPT

## 2021-06-18 PROCEDURE — 82306 VITAMIN D 25 HYDROXY: CPT

## 2021-06-18 PROCEDURE — 85025 COMPLETE CBC W/AUTO DIFF WBC: CPT

## 2021-06-18 PROCEDURE — 97110 THERAPEUTIC EXERCISES: CPT

## 2021-06-18 PROCEDURE — 84484 ASSAY OF TROPONIN QUANT: CPT

## 2021-06-18 PROCEDURE — 86769 SARS-COV-2 COVID-19 ANTIBODY: CPT

## 2021-06-18 PROCEDURE — 85610 PROTHROMBIN TIME: CPT

## 2021-06-18 PROCEDURE — 73502 X-RAY EXAM HIP UNI 2-3 VIEWS: CPT

## 2021-06-18 PROCEDURE — 86923 COMPATIBILITY TEST ELECTRIC: CPT

## 2021-06-18 PROCEDURE — 73552 X-RAY EXAM OF FEMUR 2/>: CPT

## 2021-06-18 RX ORDER — POLYETHYLENE GLYCOL 3350 17 G/17G
17 POWDER, FOR SOLUTION ORAL
Qty: 0 | Refills: 0 | DISCHARGE
Start: 2021-06-18

## 2021-06-18 RX ADMIN — Medication 2000 UNIT(S): at 12:48

## 2021-06-18 RX ADMIN — LOSARTAN POTASSIUM 100 MILLIGRAM(S): 100 TABLET, FILM COATED ORAL at 05:50

## 2021-06-18 RX ADMIN — Medication 975 MILLIGRAM(S): at 05:49

## 2021-06-18 RX ADMIN — APIXABAN 5 MILLIGRAM(S): 2.5 TABLET, FILM COATED ORAL at 05:50

## 2021-06-18 RX ADMIN — Medication 975 MILLIGRAM(S): at 14:09

## 2021-06-18 RX ADMIN — Medication 81 MILLIGRAM(S): at 12:48

## 2021-06-18 RX ADMIN — Medication 50 MILLIGRAM(S): at 05:50

## 2021-06-18 RX ADMIN — AMLODIPINE BESYLATE 5 MILLIGRAM(S): 2.5 TABLET ORAL at 05:52

## 2021-06-18 RX ADMIN — Medication 975 MILLIGRAM(S): at 06:20

## 2021-06-18 RX ADMIN — Medication 40 MILLIGRAM(S): at 05:50

## 2021-06-18 NOTE — PROGRESS NOTE ADULT - PROBLEM SELECTOR PROBLEM 1
Closed displaced intertrochanteric fracture of right femur, initial encounter

## 2021-06-18 NOTE — PROGRESS NOTE ADULT - PROBLEM SELECTOR PROBLEM 3
Congestive heart failure (CHF)

## 2021-06-18 NOTE — PROGRESS NOTE ADULT - PROBLEM SELECTOR PLAN 3
Patient has been euvolemic  follow O2 sats   follow for signs of fluid overload

## 2021-06-18 NOTE — PROGRESS NOTE ADULT - TIME BILLING
Discussed treatment plan with staff and patient at bedside.
Discussed treatment plan with staff and patient at bedside.  DC planning to rehab

## 2021-06-18 NOTE — PROGRESS NOTE ADULT - PROBLEM SELECTOR PLAN 4
Patient with residual right sided weakness  will need continue physical therapy  continue dysphagia diet
Patient with residual right sided weakness  will need continue physical therapy  continue dysphagia diet
Patient with residual right sided weakness  will need continue physical therapy

## 2021-06-18 NOTE — PROGRESS NOTE ADULT - PROBLEM SELECTOR PROBLEM 6
ARF (acute renal failure)

## 2021-06-18 NOTE — PROGRESS NOTE ADULT - PROBLEM SELECTOR PLAN 6
follow renal function  encourage PO fluids  may need to DC losartan   will continue to follow
follow renal function  start NS at 75 cc/hr  PO as tolerated  continue to monitor
follow renal function  start NS at 75 cc/hr  PO as tolerated  continue to monitor
follow renal function  start NS at 75 cc/hr  PO as tolerated

## 2021-06-18 NOTE — PROGRESS NOTE ADULT - ASSESSMENT
Impression: Stable       Plan:   Continue present treatment                 Out of bed, ambulate, weight bearing as tolerated                 Physical therapy follow up                 Continue to monitor                      
86 yo male with a hx of a cerebrovascular accident in the past presents after a fall with a right hip fx. Patient is s/p an Open reduction and internal fixation of the right hip 
88 yo male with a hx of a cerebrovascular accident in the past presents after a fall with a right hip fx. Patient is s/p an Open reduction and internal fixation of the right hip 
   Impression: Stable       Plan:   Continue present treatment                 Out of bed, ambulate, weight bearing as tolerated                 Physical therapy follow up                 Continue to monitor    Vasyl Friend PA-C  Orthopaedic Surgery  Team pager [phone]  [phone]                     
86 yo male with a hx of a cerebrovascular accident in the past presents after a fall with a right hip fx. Patient is s/p an Open reduction and internal fixation of the right hip 
A/p: 87yMale s/p R hip IM nail. VSS. NAD.  
88 yo male with a hx of a cerebrovascular accident in the past presents after a fall with a right hip fx. Patient is s/p an Open reduction and internal fixation of the right hip 
88 yo male with a hx of a cerebrovascular accident in the past presents after a fall with a right hip fx. Patient is s/p an Open reduction and internal fixation of the right hip

## 2021-06-18 NOTE — DISCHARGE NOTE NURSING/CASE MANAGEMENT/SOCIAL WORK - PATIENT PORTAL LINK FT
You can access the FollowMyHealth Patient Portal offered by [facility] by registering at the following website: [email or web address]. By joining [facility]’s FollowMyHealth portal, you will also be able to view your health information using other applications (apps) compatible with our system.

## 2021-06-18 NOTE — PROGRESS NOTE ADULT - SUBJECTIVE AND OBJECTIVE BOX
Ortho post op     Patient resting without complaints.   Waiting on OR   No chest pain, SOB, N/V.    Vital Signs Last 24 Hrs  T(C): 36.7 (14 Jun 2021 04:06), Max: 37.2 (13 Jun 2021 13:00)  T(F): 98.1 (14 Jun 2021 04:06), Max: 99 (13 Jun 2021 13:00)  HR: 86 (14 Jun 2021 04:06) (59 - 86)  BP: 146/73 (14 Jun 2021 04:06) (137/64 - 186/86)  BP(mean): 98 (13 Jun 2021 14:00) (98 - 123)  RR: 16 (14 Jun 2021 04:06) (14 - 18)  SpO2: 99% (14 Jun 2021 04:06) (95% - 100%)    Exam:    Gen: Awake alert    EXT:   RLE                   (+) shortening ER             n/v/i  No significant changes             (+) pulses via DOPPLER w/ nursing staff                                                            11.9   15.09 )-----------( 159      ( 13 Jun 2021 13:14 )             36.6     06-13    141  |  102  |  21  ----------------------------<  134<H>  3.9   |  25  |  0.95    Ca    9.0      13 Jun 2021 13:14    TPro  x   /  Alb  3.5  /  TBili  x   /  DBili  x   /  AST  x   /  ALT  x   /  AlkPhos  x   06-13      A/P  87M with R subtrochanteric Fx s/p IMN    -pain control  -PT  -WBAT  -OOB  -DVT ppx  -dispo plan  
Patient is an 86 yo male h/o Afib on Eliquis, CVA, CHF p/w fall. Fell while leaving a restaurant. Immediate R leg pain, unable to stand. Denies LOC. Denies headache or neck pain. Given 50mcg Fentanyl by EMS for pain. Patient states he turned his head to look at something and tripped. Denies any syncope or LOC. Patient was brought to [facility] for further treatment where he was found to have a right hip fx. Patient is S/P an Open reduction and internal fixation of the right hip. Patient seen now resting comfortably. denies any CP or HER. Patient tolerated the procedure well.  Patient has been OOB in chair. Patient with complaint of pain at surgical site.       MEDICATIONS  (STANDING):  acetaminophen   Tablet .. 975 milliGRAM(s) Oral every 8 hours  amLODIPine   Tablet 5 milliGRAM(s) Oral daily  apixaban 5 milliGRAM(s) Oral two times a day  aspirin  chewable 81 milliGRAM(s) Oral daily  cholecalciferol 2000 Unit(s) Oral daily  furosemide    Tablet 40 milliGRAM(s) Oral daily  losartan 100 milliGRAM(s) Oral daily  metoprolol succinate ER 50 milliGRAM(s) Oral daily  senna 2 Tablet(s) Oral at bedtime    MEDICATIONS  (PRN):  bisacodyl Suppository 10 milliGRAM(s) Rectal daily PRN Constipation  oxyCODONE    IR 2.5 milliGRAM(s) Oral every 4 hours PRN Moderate Pain (4 - 6)  oxyCODONE    IR 5 milliGRAM(s) Oral every 4 hours PRN Severe Pain (7 - 10)  polyethylene glycol 3350 17 Gram(s) Oral daily PRN Constipation          VITALS:   T(C): 36.3 (06-15-21 @ 09:15), Max: 36.9 (06-14-21 @ 23:51)  HR: 87 (06-15-21 @ 09:15) (58 - 87)  BP: 152/78 (06-15-21 @ 09:15) (119/52 - 152/78)  RR: 18 (06-15-21 @ 09:15) (16 - 18)  SpO2: 100% (06-15-21 @ 09:15) (94% - 100%)  Wt(kg): --    PHYSICAL EXAM:  GENERAL: NAD, well-groomed, well-developed  HEAD:  Atraumatic, Normocephalic  EYES: EOMI, PERRLA, conjunctiva and sclera clear  ENMT: slurred speech after his CVA  NECK: Supple, No JVD, Normal thyroid  NERVOUS SYSTEM:  Alert & Oriented X3, Good concentration; RLE weakness  CHEST/LUNG: Clear to percussion bilaterally; No rales, rhonchi, wheezing, or rubs  HEART: Regular rate and rhythm; No murmurs, rubs, or gallops  ABDOMEN: Soft, Nontender, Nondistended; Bowel sounds present  EXTREMITIES:  2+ Peripheral Pulses, No clubbing, cyanosis, or edema  LYMPH: No lymphadenopathy noted  SKIN: No rashes or lesions    LABS:        CBC Full  -  ( 15 Nick 2021 11:44 )  WBC Count : 14.32 K/uL  RBC Count : 2.86 M/uL  Hemoglobin : 9.3 g/dL  Hematocrit : 28.9 %  Platelet Count - Automated : 154 K/uL  Mean Cell Volume : 101.0 fl  Mean Cell Hemoglobin : 32.5 pg  Mean Cell Hemoglobin Concentration : 32.2 gm/dL  Auto Neutrophil # : x  Auto Lymphocyte # : x  Auto Monocyte # : x  Auto Eosinophil # : x  Auto Basophil # : x  Auto Neutrophil % : x  Auto Lymphocyte % : x  Auto Monocyte % : x  Auto Eosinophil % : x  Auto Basophil % : x    06-15    139  |  99  |  31<H>  ----------------------------<  143<H>  3.9   |  24  |  1.55<H>    Ca    8.9      15 Nick 2021 11:44            CAPILLARY BLOOD GLUCOSE          RADIOLOGY & ADDITIONAL TESTS:      
Patient is an 88 yo male h/o Afib on Eliquis, CVA, CHF p/w fall. Fell while leaving a restaurant. Immediate R leg pain, unable to stand. Denies LOC. Denies headache or neck pain. Given 50mcg Fentanyl by EMS for pain. Patient states he turned his head to look at something and tripped. Denies any syncope or LOC. Patient was brought to [facility] for further treatment where he was found to have a right hip fx. Patient is S/P an Open reduction and internal fixation of the right hip. Patient seen now resting comfortably. denies any CP or HER. Patient tolerated the procedure well.   Patient with complaint of pain at surgical site. Patient needs continue physical therapy with hx of right sided weakness after cerebrovascular accident       MEDICATIONS  (STANDING):  acetaminophen   Tablet .. 975 milliGRAM(s) Oral every 8 hours  amLODIPine   Tablet 5 milliGRAM(s) Oral daily  apixaban 5 milliGRAM(s) Oral two times a day  aspirin  chewable 81 milliGRAM(s) Oral daily  cholecalciferol 2000 Unit(s) Oral daily  furosemide    Tablet 40 milliGRAM(s) Oral daily  lactated ringers. 1000 milliLiter(s) (80 mL/Hr) IV Continuous <Continuous>  losartan 100 milliGRAM(s) Oral daily  metoprolol succinate ER 50 milliGRAM(s) Oral daily  senna 2 Tablet(s) Oral at bedtime    MEDICATIONS  (PRN):  bisacodyl Suppository 10 milliGRAM(s) Rectal daily PRN Constipation  oxyCODONE    IR 5 milliGRAM(s) Oral every 4 hours PRN Severe Pain (7 - 10)  oxyCODONE    IR 2.5 milliGRAM(s) Oral every 4 hours PRN Moderate Pain (4 - 6)  polyethylene glycol 3350 17 Gram(s) Oral daily PRN Constipation          VITALS:   T(C): 36.7 (06-16-21 @ 08:09), Max: 37.2 (06-16-21 @ 05:19)  HR: 84 (06-16-21 @ 08:09) (74 - 88)  BP: 112/57 (06-16-21 @ 08:09) (112/57 - 158/78)  RR: 18 (06-16-21 @ 08:09) (18 - 18)  SpO2: 97% (06-16-21 @ 08:09) (94% - 100%)  Wt(kg): --    PHYSICAL EXAM:  GENERAL: NAD, well-groomed, well-developed  HEAD:  Atraumatic, Normocephalic  EYES: EOMI, PERRLA, conjunctiva and sclera clear  ENMT: slurred speech after his CVA  NECK: Supple, No JVD, Normal thyroid  NERVOUS SYSTEM:  Alert & Oriented X3, Good concentration; RLE weakness  CHEST/LUNG: Clear to percussion bilaterally; No rales, rhonchi, wheezing, or rubs  HEART: Regular rate and rhythm; No murmurs, rubs, or gallops  ABDOMEN: Soft, Nontender, Nondistended; Bowel sounds present  EXTREMITIES:  2+ Peripheral Pulses, No clubbing, cyanosis, or edema  LYMPH: No lymphadenopathy noted  SKIN: No rashes or lesions    LABS:        CBC Full  -  ( 16 Jun 2021 07:03 )  WBC Count : 11.89 K/uL  RBC Count : 2.56 M/uL  Hemoglobin : 8.4 g/dL  Hematocrit : 26.0 %  Platelet Count - Automated : 147 K/uL  Mean Cell Volume : 101.6 fl  Mean Cell Hemoglobin : 32.8 pg  Mean Cell Hemoglobin Concentration : 32.3 gm/dL  Auto Neutrophil # : x  Auto Lymphocyte # : x  Auto Monocyte # : x  Auto Eosinophil # : x  Auto Basophil # : x  Auto Neutrophil % : x  Auto Lymphocyte % : x  Auto Monocyte % : x  Auto Eosinophil % : x  Auto Basophil % : x    06-16    140  |  100  |  39<H>  ----------------------------<  108<H>  3.9   |  21<L>  |  1.60<H>    Ca    8.8      16 Jun 2021 07:04            CAPILLARY BLOOD GLUCOSE          RADIOLOGY & ADDITIONAL TESTS:      
Patient  seen and examined this AM, resting in chair, no issues overnight.  Denies any CP or SOB, on NC.  States he is itchy this morning.  Pain well controlled with some breakthrough      Vital Signs Last 24 Hrs  T(C): 36.6 (15 Nick 2021 04:42), Max: 36.9 (14 Jun 2021 23:51)  T(F): 97.9 (15 Nick 2021 04:42), Max: 98.5 (14 Jun 2021 23:51)  HR: 75 (15 Nick 2021 04:42) (58 - 77)  BP: 144/76 (15 Nick 2021 04:42) (119/52 - 149/73)  BP(mean): --  RR: 18 (15 Nick 2021 04:42) (16 - 18)  SpO2: 94% (15 Nick 2021 04:42) (94% - 100%)      Exam:    Gen: Awake alert    EXT:   RLE            Dressing C/D/I with ABD's and Tape, mild swelling to thigh and TTP  Compartments Soft  +EHL/FHL/TA/GSC  +DP and PT +2   SILT Throughout LE L2-S1  No Calf TTP B/L      A/P  87M with R subtrochanteric Fx s/p IMN POD 2    - Will give PO benadryl for itching, likely med related, no signs of allergic reaction   - Post op labs stable   - appreciated med management   -pain control  -PT  -WBAT  -OOB  -DVT ppx- Eliquis and A81   -dispo plan- [place] pending authorization   - Will d/w Dr. Bejarano and advise if plan changes   
 ORTHO  Patient is a 87y old  Male who presents with a chief complaint of R hip IM nail (16 Jun 2021 06:44)    Pt. resting without complaint    VS-  T(C): 36.7 (06-17-21 @ 04:39), Max: 37.5 (06-16-21 @ 21:08)  HR: 103 (06-17-21 @ 04:39) (80 - 103)  BP: 150/59 (06-17-21 @ 04:39) (112/57 - 160/64)  RR: 18 (06-17-21 @ 04:39) (18 - 18)  SpO2: 92% (06-17-21 @ 04:39) (92% - 98%)  Wt(kg): --    M.S. Alert  Extremity- Right LE-dressing C/D/I  Neuro-              Motor- (+) Ankle- DF/PF              Sensation- grossly intact to light touch              Calves- soft, nontender- PAS                               8.4    11.89 )-----------( 147      ( 16 Jun 2021 07:03 )             26.0     06-16    138  |  100  |  40<H>  ----------------------------<  129<H>  3.7   |  27  |  1.59<H>    Ca    8.8      16 Jun 2021 17:20          
 ORTHO  Patient is a 87y old  Male who presents with a chief complaint of R hip IM nail (16 Jun 2021 06:44)    Pt. resting without complaint    VS-  Vital Signs Last 24 Hrs  T(C): 36.8 (18 Jun 2021 04:32), Max: 36.9 (17 Jun 2021 07:45)  T(F): 98.2 (18 Jun 2021 04:32), Max: 98.5 (17 Jun 2021 20:00)  HR: 77 (18 Jun 2021 04:32) (74 - 85)  BP: 150/67 (18 Jun 2021 04:32) (119/57 - 150/67)  BP(mean): --  RR: 16 (18 Jun 2021 04:32) (16 - 18)  SpO2: 93% (18 Jun 2021 04:32) (92% - 98%)    M.S. Alert  Extremity- Right LE-dressing C/D/I  Neuro-              Motor- (+) Ankle- DF/PF              Sensation- grossly intact to light touch              Calves- soft, nontender- PAS                                          7.1    10.40 )-----------( 156      ( 17 Jun 2021 06:46 )             21.7   06-17    138  |  100  |  42<H>  ----------------------------<  140<H>  3.9   |  26  |  1.50<H>    Ca    8.4      17 Jun 2021 06:46            
Patient is an 86 yo male h/o Afib on Eliquis, CVA, CHF p/w fall. Fell while leaving a restaurant. Immediate R leg pain, unable to stand. Denies LOC. Denies headache or neck pain. Given 50mcg Fentanyl by EMS for pain. Patient states he turned his head to look at something and tripped. Denies any syncope or LOC. Patient was brought to [facility] for further treatment where he was found to have a right hip fx. Patient is S/P an Open reduction and internal fixation of the right hip. Patient seen now resting comfortably. denies any CP or HER. Patient tolerated the procedure well. Pain has been well controlled.      MEDICATIONS  (STANDING):  acetaminophen   Tablet .. 975 milliGRAM(s) Oral every 8 hours  amLODIPine   Tablet 5 milliGRAM(s) Oral daily  apixaban 5 milliGRAM(s) Oral two times a day  aspirin  chewable 81 milliGRAM(s) Oral daily  cholecalciferol 2000 Unit(s) Oral daily  furosemide    Tablet 40 milliGRAM(s) Oral daily  losartan 100 milliGRAM(s) Oral daily  metoprolol succinate ER 50 milliGRAM(s) Oral daily    MEDICATIONS  (PRN):  oxyCODONE    IR 2.5 milliGRAM(s) Oral every 4 hours PRN Moderate Pain (4 - 6)  oxyCODONE    IR 5 milliGRAM(s) Oral every 4 hours PRN Severe Pain (7 - 10)          VITALS:   T(C): 36.7 (06-14-21 @ 15:00), Max: 36.8 (06-14-21 @ 10:28)  HR: 77 (06-14-21 @ 15:00) (62 - 86)  BP: 132/70 (06-14-21 @ 15:00) (120/67 - 165/68)  RR: 16 (06-14-21 @ 15:00) (16 - 16)  SpO2: 98% (06-14-21 @ 15:00) (97% - 100%)  Wt(kg): --    PHYSICAL EXAM:  GENERAL: NAD, well-groomed, well-developed  HEAD:  Atraumatic, Normocephalic  EYES: EOMI, PERRLA, conjunctiva and sclera clear  ENMT: slurred speech after his CVA  NECK: Supple, No JVD, Normal thyroid  NERVOUS SYSTEM:  Alert & Oriented X3, Good concentration; RLE weakness  CHEST/LUNG: Clear to percussion bilaterally; No rales, rhonchi, wheezing, or rubs  HEART: Regular rate and rhythm; No murmurs, rubs, or gallops  ABDOMEN: Soft, Nontender, Nondistended; Bowel sounds present  EXTREMITIES:  2+ Peripheral Pulses, No clubbing, cyanosis, or edema  LYMPH: No lymphadenopathy noted  SKIN: No rashes or lesions    LABS:        CBC Full  -  ( 14 Jun 2021 07:32 )  WBC Count : 12.13 K/uL  RBC Count : 3.18 M/uL  Hemoglobin : 10.3 g/dL  Hematocrit : 31.9 %  Platelet Count - Automated : 132 K/uL  Mean Cell Volume : 100.3 fl  Mean Cell Hemoglobin : 32.4 pg  Mean Cell Hemoglobin Concentration : 32.3 gm/dL  Auto Neutrophil # : 8.50 K/uL  Auto Lymphocyte # : 1.51 K/uL  Auto Monocyte # : 1.87 K/uL  Auto Eosinophil # : 0.14 K/uL  Auto Basophil # : 0.02 K/uL  Auto Neutrophil % : 70.1 %  Auto Lymphocyte % : 12.4 %  Auto Monocyte % : 15.4 %  Auto Eosinophil % : 1.2 %  Auto Basophil % : 0.2 %    06-14    141  |  103  |  21  ----------------------------<  116<H>  3.7   |  25  |  1.08    Ca    8.3<L>      14 Jun 2021 07:32    TPro  x   /  Alb  3.5  /  TBili  x   /  DBili  x   /  AST  x   /  ALT  x   /  AlkPhos  x   06-13    LIVER FUNCTIONS - ( 13 Jun 2021 01:47 )  Alb: 3.5 g/dL / Pro: x     / ALK PHOS: x     / ALT: x     / AST: x     / GGT: x           PT/INR - ( 13 Jun 2021 05:18 )   PT: 13.7 sec;   INR: 1.15 ratio         PTT - ( 13 Jun 2021 05:18 )  PTT:31.1 sec    CAPILLARY BLOOD GLUCOSE          RADIOLOGY & ADDITIONAL TESTS:      
Post op Day [3 ]    Patient resting without complaints.  No chest pain, SOB, N/V.    T(C): 37.2 (06-16-21 @ 05:19), Max: 37.2 (06-16-21 @ 05:19)  HR: 86 (06-16-21 @ 05:19) (74 - 88)  BP: 144/69 (06-16-21 @ 05:19) (115/66 - 158/78)  RR: 18 (06-16-21 @ 05:19) (18 - 18)  SpO2: 97% (06-16-21 @ 05:19) (94% - 100%)  Wt(kg): --    Exam:  Alert and Oriented, No Acute Distress  R hip dsg c/d/i with soft/compressible compartments  Calves Soft, Non-tender bilaterally  +PF/DF/EHL/FHL  SILT  Dopperable DP pulse                       9.3    14.32 )-----------( 154      ( 15 Nick 2021 11:44 )             28.9    06-15    139  |  99  |  31<H>  ----------------------------<  143<H>  3.9   |  24  |  1.55<H>    Ca    8.9      15 Nick 2021 11:44        
Patient is an 88 yo male h/o Afib on Eliquis, CVA, CHF p/w fall. Fell while leaving a restaurant. Immediate R leg pain, unable to stand. Denies LOC. Denies headache or neck pain. Given 50mcg Fentanyl by EMS for pain. Patient states he turned his head to look at something and tripped. Denies any syncope or LOC. Patient was [facility] to [facility] for further treatment where he was found to have a right hip fx. Patient is S/P an Open reduction and internal fixation of the right hip. Patient seen now resting comfortably. denies any CP or HER. Patient tolerated the procedure well.   Patient with complaint of pain at surgical site. Patient needs continue physical therapy with hx of right sided weakness after cerebrovascular accident. H&H is stable and Patient is scheduled for transfer to rehab.       MEDICATIONS  (STANDING):  acetaminophen   Tablet .. 975 milliGRAM(s) Oral every 8 hours  amLODIPine   Tablet 5 milliGRAM(s) Oral daily  apixaban 5 milliGRAM(s) Oral two times a day  aspirin  chewable 81 milliGRAM(s) Oral daily  cholecalciferol 2000 Unit(s) Oral daily  furosemide    Tablet 40 milliGRAM(s) Oral daily  losartan 100 milliGRAM(s) Oral daily  metoprolol succinate ER 50 milliGRAM(s) Oral daily  senna 2 Tablet(s) Oral at bedtime    MEDICATIONS  (PRN):  bisacodyl Suppository 10 milliGRAM(s) Rectal daily PRN Constipation  oxyCODONE    IR 5 milliGRAM(s) Oral every 4 hours PRN Severe Pain (7 - 10)  oxyCODONE    IR 2.5 milliGRAM(s) Oral every 4 hours PRN Moderate Pain (4 - 6)  polyethylene glycol 3350 17 Gram(s) Oral daily PRN Constipation          VITALS:   T(C): 36.6 (06-18-21 @ 13:42), Max: 36.9 (06-17-21 @ 20:00)  HR: 77 (06-18-21 @ 13:42) (74 - 84)  BP: 155/69 (06-18-21 @ 13:42) (119/57 - 155/69)  RR: 17 (06-18-21 @ 13:42) (16 - 18)  SpO2: 93% (06-18-21 @ 13:42) (93% - 96%)  Wt(kg): --    PHYSICAL EXAM:  GENERAL: NAD, well-groomed, well-developed  HEAD:  Atraumatic, Normocephalic  EYES: EOMI, PERRLA, conjunctiva and sclera clear  ENMT: slurred speech after his CVA  NECK: Supple, No JVD, Normal thyroid  NERVOUS SYSTEM:  Alert & Oriented X3, Good concentration; RLE weakness  CHEST/LUNG: Clear to percussion bilaterally; No rales, rhonchi, wheezing, or rubs  HEART: Regular rate and rhythm; No murmurs, rubs, or gallops  ABDOMEN: Soft, Nontender, Nondistended; Bowel sounds present  EXTREMITIES:  2+ Peripheral Pulses, No clubbing, cyanosis, or edema  LYMPH: No lymphadenopathy noted  SKIN: No rashes or lesions    LABS:        CBC Full  -  ( 18 Jun 2021 06:54 )  WBC Count : 10.06 K/uL  RBC Count : 2.64 M/uL  Hemoglobin : 8.6 g/dL  Hematocrit : 25.8 %  Platelet Count - Automated : 183 K/uL  Mean Cell Volume : 97.7 fl  Mean Cell Hemoglobin : 32.6 pg  Mean Cell Hemoglobin Concentration : 33.3 gm/dL  Auto Neutrophil # : x  Auto Lymphocyte # : x  Auto Monocyte # : x  Auto Eosinophil # : x  Auto Basophil # : x  Auto Neutrophil % : x  Auto Lymphocyte % : x  Auto Monocyte % : x  Auto Eosinophil % : x  Auto Basophil % : x    06-18    141  |  103  |  41<H>  ----------------------------<  103<H>  4.1   |  26  |  1.30    Ca    8.6      18 Jun 2021 06:54            CAPILLARY BLOOD GLUCOSE          RADIOLOGY & ADDITIONAL TESTS:      
Post op Day [ ]  Hospital Day [1 ]     Patient resting without complaints.   Waiting on OR   No chest pain, SOB, N/V.    T(C): 36.6 (06-13-21 @ 04:50), Max: 36.8 (06-12-21 @ 18:36)  HR: 66 (06-13-21 @ 04:50) (56 - 80)  BP: 140/70 (06-13-21 @ 06:32) (140/70 - 200/70)  RR: 18 (06-13-21 @ 04:50) (16 - 20)  SpO2: 98% (06-13-21 @ 04:50) (95% - 99%)  Wt(kg): --    Exam:    Gen: Awake alert    EXT:   RLE                   (+) shortening ER             n/v/i  No significant changes             (+) pulses via DOPPLER w/ nursing staff                                                 11.1   10.97 )-----------( 147      ( 13 Jun 2021 05:18 )             34.7    06-13    143  |  106  |  23  ----------------------------<  123<H>  3.8   |  24  |  0.89    Ca    8.7      13 Jun 2021 05:18    TPro  x   /  Alb  3.5  /  TBili  x   /  DBili  x   /  AST  x   /  ALT  x   /  AlkPhos  x   06-13  PT/INR - ( 13 Jun 2021 05:18 )   PT: 13.7 sec;   INR: 1.15 ratio         PTT - ( 13 Jun 2021 05:18 )  PTT:31.1 sec    A/P  Patient S/P R subtrochanteric Fx    NPO  for OR  Await Clearance
Patient is an 88 yo male h/o Afib on Eliquis, CVA, CHF p/w fall. Fell while leaving a restaurant. Immediate R leg pain, unable to stand. Denies LOC. Denies headache or neck pain. Given 50mcg Fentanyl by EMS for pain. Patient states he turned his head to look at something and tripped. Denies any syncope or LOC. Patient was brought to [facility] for further treatment where he was found to have a right hip fx. Patient is S/P an Open reduction and internal fixation of the right hip. Patient seen now resting comfortably. denies any CP or HER. Patient tolerated the procedure well.   Patient with complaint of pain at surgical site. Patient needs continue physical therapy with hx of right sided weakness after cerebrovascular accident. Patient found to have ABLA and is receiving a transfusion      MEDICATIONS  (STANDING):  acetaminophen   Tablet .. 975 milliGRAM(s) Oral every 8 hours  amLODIPine   Tablet 5 milliGRAM(s) Oral daily  apixaban 5 milliGRAM(s) Oral two times a day  aspirin  chewable 81 milliGRAM(s) Oral daily  cholecalciferol 2000 Unit(s) Oral daily  furosemide    Tablet 40 milliGRAM(s) Oral daily  lactated ringers. 1000 milliLiter(s) (80 mL/Hr) IV Continuous <Continuous>  losartan 100 milliGRAM(s) Oral daily  metoprolol succinate ER 50 milliGRAM(s) Oral daily  senna 2 Tablet(s) Oral at bedtime    MEDICATIONS  (PRN):  bisacodyl Suppository 10 milliGRAM(s) Rectal daily PRN Constipation  oxyCODONE    IR 2.5 milliGRAM(s) Oral every 4 hours PRN Moderate Pain (4 - 6)  oxyCODONE    IR 5 milliGRAM(s) Oral every 4 hours PRN Severe Pain (7 - 10)  polyethylene glycol 3350 17 Gram(s) Oral daily PRN Constipation          VITALS:   T(C): 36.7 (06-17-21 @ 16:40), Max: 37.5 (06-16-21 @ 21:08)  HR: 78 (06-17-21 @ 16:40) (74 - 103)  BP: 119/57 (06-17-21 @ 16:40) (119/57 - 160/64)  RR: 18 (06-17-21 @ 16:40) (18 - 18)  SpO2: 96% (06-17-21 @ 16:40) (92% - 98%)  Wt(kg): --    PHYSICAL EXAM:  GENERAL: NAD, well-groomed, well-developed  HEAD:  Atraumatic, Normocephalic  EYES: EOMI, PERRLA, conjunctiva and sclera clear  ENMT: slurred speech after his CVA  NECK: Supple, No JVD, Normal thyroid  NERVOUS SYSTEM:  Alert & Oriented X3, Good concentration; RLE weakness  CHEST/LUNG: Clear to percussion bilaterally; No rales, rhonchi, wheezing, or rubs  HEART: Regular rate and rhythm; No murmurs, rubs, or gallops  ABDOMEN: Soft, Nontender, Nondistended; Bowel sounds present  EXTREMITIES:  2+ Peripheral Pulses, No clubbing, cyanosis, or edema  LYMPH: No lymphadenopathy noted  SKIN: No rashes or lesions    LABS:        CBC Full  -  ( 17 Jun 2021 06:46 )  WBC Count : 10.40 K/uL  RBC Count : 2.21 M/uL  Hemoglobin : 7.1 g/dL  Hematocrit : 21.7 %  Platelet Count - Automated : 156 K/uL  Mean Cell Volume : 98.2 fl  Mean Cell Hemoglobin : 32.1 pg  Mean Cell Hemoglobin Concentration : 32.7 gm/dL  Auto Neutrophil # : x  Auto Lymphocyte # : x  Auto Monocyte # : x  Auto Eosinophil # : x  Auto Basophil # : x  Auto Neutrophil % : x  Auto Lymphocyte % : x  Auto Monocyte % : x  Auto Eosinophil % : x  Auto Basophil % : x    06-17    138  |  100  |  42<H>  ----------------------------<  140<H>  3.9   |  26  |  1.50<H>    Ca    8.4      17 Jun 2021 06:46            CAPILLARY BLOOD GLUCOSE          RADIOLOGY & ADDITIONAL TESTS:

## 2021-06-18 NOTE — PROGRESS NOTE ADULT - PROBLEM SELECTOR PLAN 1
***See Above  Aryan CROOK  Orthopedics  B: [id number]  S: [phone]
Patient tolerated the procedure well  adjust pain meds as needed  continue physical therapy as tolerated  weight bearing as tolerated
***See Above  Aryan CROOK  Orthopedics  B: [id number]  S: [phone]
PT/OT-WBAT  IS  DVT PPx  Pain Control  Continue Current Tx.  Ck AM labs  Discharge planning    Meño Johnson PA-C  Team Pager: #[phone]
Patient tolerated the procedure well  Pain has been well controlled  physical therapy to see Patient  weight bearing as tolerated
***See Above  Arayn CROOK  Orthopedics  B: [id number]  S: [phone]
Patient found to be anemic  receiving a unit of PRBC  continue to monitor H&H   adjust pain meds as needed  continue physical therapy as tolerated  weight bearing as tolerated  Patient would benefit from in Patient rehab
H&H has been stable   adjust pain meds as needed  continue physical therapy as tolerated  weight bearing as tolerated  DC planning to rehab
Patient tolerated the procedure well  adjust pain meds as needed  continue physical therapy as tolerated  weight bearing as tolerated  Patient would benefit from inPatient rehab

## 2021-06-18 NOTE — PROGRESS NOTE ADULT - PROVIDER SPECIALTY LIST ADULT
Orthopedics
Internal Medicine

## 2021-06-18 NOTE — PROGRESS NOTE ADULT - PROBLEM SELECTOR PLAN 2
Rate has been controlled  continue metoprolol for rate control  continue eliquis for AC

## 2021-06-18 NOTE — PROGRESS NOTE ADULT - PROBLEM SELECTOR PLAN 5
continue losartan and amlodipine  will continue to follow BP  will adjust meds as needed

## 2021-06-18 NOTE — PROGRESS NOTE ADULT - NSICDXPILOT_GEN_ALL_CORE
[place]

## 2021-06-20 ENCOUNTER — EMERGENCY (EMERGENCY)
Facility: HOSPITAL | Age: 86
LOS: 1 days | Discharge: HOME | End: 2021-06-20
Attending: EMERGENCY MEDICINE | Admitting: EMERGENCY MEDICINE
Payer: MEDICARE

## 2021-06-20 VITALS
TEMPERATURE: 98 F | SYSTOLIC BLOOD PRESSURE: 150 MMHG | OXYGEN SATURATION: 98 % | WEIGHT: 149.91 LBS | HEIGHT: 67 IN | DIASTOLIC BLOOD PRESSURE: 64 MMHG | RESPIRATION RATE: 18 BRPM | HEART RATE: 90 BPM

## 2021-06-20 LAB
ALBUMIN SERPL ELPH-MCNC: 2.6 G/DL — LOW (ref 3.3–5)
ALP SERPL-CCNC: 69 U/L — SIGNIFICANT CHANGE UP (ref 40–120)
ALT FLD-CCNC: 34 U/L — SIGNIFICANT CHANGE UP (ref 10–45)
ANION GAP SERPL CALC-SCNC: 11 MMOL/L — SIGNIFICANT CHANGE UP (ref 5–17)
AST SERPL-CCNC: 51 U/L — HIGH (ref 10–40)
BASOPHILS # BLD AUTO: 0.04 K/UL — SIGNIFICANT CHANGE UP (ref 0–0.2)
BASOPHILS NFR BLD AUTO: 0.4 % — SIGNIFICANT CHANGE UP (ref 0–2)
BILIRUB SERPL-MCNC: 1.9 MG/DL — HIGH (ref 0.2–1.2)
BUN SERPL-MCNC: 32 MG/DL — HIGH (ref 7–23)
CALCIUM SERPL-MCNC: 8.7 MG/DL — SIGNIFICANT CHANGE UP (ref 8.4–10.5)
CHLORIDE SERPL-SCNC: 105 MMOL/L — SIGNIFICANT CHANGE UP (ref 96–108)
CO2 SERPL-SCNC: 28 MMOL/L — SIGNIFICANT CHANGE UP (ref 22–31)
CREAT SERPL-MCNC: 1.15 MG/DL — SIGNIFICANT CHANGE UP (ref 0.5–1.3)
EOSINOPHIL # BLD AUTO: 0.36 K/UL — SIGNIFICANT CHANGE UP (ref 0–0.5)
EOSINOPHIL NFR BLD AUTO: 3.2 % — SIGNIFICANT CHANGE UP (ref 0–6)
GLUCOSE SERPL-MCNC: 122 MG/DL — HIGH (ref 70–99)
HCT VFR BLD CALC: 29.2 % — LOW (ref 39–50)
HGB BLD-MCNC: 9.5 G/DL — LOW (ref 13–17)
HYPOCHROMIA BLD QL: SLIGHT — SIGNIFICANT CHANGE UP
IMM GRANULOCYTES NFR BLD AUTO: 1.8 % — HIGH (ref 0–1.5)
LYMPHOCYTES # BLD AUTO: 1.68 K/UL — SIGNIFICANT CHANGE UP (ref 1–3.3)
LYMPHOCYTES # BLD AUTO: 14.8 % — SIGNIFICANT CHANGE UP (ref 13–44)
MACROCYTES BLD QL: SLIGHT — SIGNIFICANT CHANGE UP
MANUAL SMEAR VERIFICATION: SIGNIFICANT CHANGE UP
MCHC RBC-ENTMCNC: 32.5 GM/DL — SIGNIFICANT CHANGE UP (ref 32–36)
MCHC RBC-ENTMCNC: 33.1 PG — SIGNIFICANT CHANGE UP (ref 27–34)
MCV RBC AUTO: 101.7 FL — HIGH (ref 80–100)
MONOCYTES # BLD AUTO: 1.92 K/UL — HIGH (ref 0–0.9)
MONOCYTES NFR BLD AUTO: 16.9 % — HIGH (ref 2–14)
NEUTROPHILS # BLD AUTO: 7.17 K/UL — SIGNIFICANT CHANGE UP (ref 1.8–7.4)
NEUTROPHILS NFR BLD AUTO: 62.9 % — SIGNIFICANT CHANGE UP (ref 43–77)
NRBC # BLD: 0 /100 WBCS — SIGNIFICANT CHANGE UP (ref 0–0)
NT-PROBNP SERPL-SCNC: 5333 PG/ML — HIGH (ref 0–300)
PLAT MORPH BLD: NORMAL — SIGNIFICANT CHANGE UP
PLATELET # BLD AUTO: 266 K/UL — SIGNIFICANT CHANGE UP (ref 150–400)
POTASSIUM SERPL-MCNC: 4 MMOL/L — SIGNIFICANT CHANGE UP (ref 3.5–5.3)
POTASSIUM SERPL-SCNC: 4 MMOL/L — SIGNIFICANT CHANGE UP (ref 3.5–5.3)
PROT SERPL-MCNC: 6.9 G/DL — SIGNIFICANT CHANGE UP (ref 6–8.3)
RBC # BLD: 2.87 M/UL — LOW (ref 4.2–5.8)
RBC # FLD: 13.9 % — SIGNIFICANT CHANGE UP (ref 10.3–14.5)
RBC BLD AUTO: ABNORMAL
SODIUM SERPL-SCNC: 144 MMOL/L — SIGNIFICANT CHANGE UP (ref 135–145)
TROPONIN I SERPL-MCNC: 0.04 NG/ML — SIGNIFICANT CHANGE UP (ref 0.02–0.06)
WBC # BLD: 11.38 K/UL — HIGH (ref 3.8–10.5)
WBC # FLD AUTO: 11.38 K/UL — HIGH (ref 3.8–10.5)

## 2021-06-20 PROCEDURE — 99285 EMERGENCY DEPT VISIT HI MDM: CPT

## 2021-06-20 PROCEDURE — 71275 CT ANGIOGRAPHY CHEST: CPT | Mod: MA

## 2021-06-20 PROCEDURE — 93005 ELECTROCARDIOGRAM TRACING: CPT

## 2021-06-20 PROCEDURE — 84484 ASSAY OF TROPONIN QUANT: CPT

## 2021-06-20 PROCEDURE — 93010 ELECTROCARDIOGRAM REPORT: CPT

## 2021-06-20 PROCEDURE — 83880 ASSAY OF NATRIURETIC PEPTIDE: CPT

## 2021-06-20 PROCEDURE — 71275 CT ANGIOGRAPHY CHEST: CPT | Mod: 26,MA

## 2021-06-20 PROCEDURE — 80053 COMPREHEN METABOLIC PANEL: CPT

## 2021-06-20 PROCEDURE — 71045 X-RAY EXAM CHEST 1 VIEW: CPT

## 2021-06-20 PROCEDURE — 85025 COMPLETE CBC W/AUTO DIFF WBC: CPT

## 2021-06-20 PROCEDURE — 36415 COLL VENOUS BLD VENIPUNCTURE: CPT

## 2021-06-20 PROCEDURE — 99284 EMERGENCY DEPT VISIT MOD MDM: CPT | Mod: 25

## 2021-06-20 PROCEDURE — 71045 X-RAY EXAM CHEST 1 VIEW: CPT | Mod: 26

## 2021-06-20 NOTE — ED PROVIDER NOTE - CPE EDP ENMT NORM
Internal Medicine History and Physical    Admission Date: 2020                     Patient: Jarett Diehl             Date of Service:  20  : 1947                           73 year old female    Presenting Complaint:  Vomiting   Weakness     HPI:  This is a 73 year old female with past medical history significant for type II diabetes, hyperlipidemia, hypothyroidism, obesity and vertigo presented to the ED due to 7-day history of progressing weakness and recurrent falls where she recently hit her head after a mechanical fall.  She also had an episode of dark emesis prior to arrival to the ED.   In the ED, she was found hyperglycemic, had guaiac positive stool, vitals were stable, labs remarkable for anemia, CT of head with no acute abnormalities. She was admitted for further management.  Patient recently lost her , during my visit, she appears weak and states she feels \"fine,\" denies pain, black stools, or breathing problems, per RN, had dark stools over night.      Past Medical History:  Past Medical History:   Diagnosis Date   • Diabetes mellitus (CMS/[facility])    • Hypertension    • Thyroid disease        Past Surgical History:  Past Surgical History:   Procedure Laterality Date   •  section, classic  1986   • Colonoscopy  16    Dr. Shannon   • Eye surgery      cataract   • Knee surgery  10/10/2013    right, Arthroscopy       Family History:  Family History   Problem Relation Age of Onset   • Diabetes Sister    • Diabetes Brother        Social History:  Social History     Socioeconomic History   • Marital status: /Civil Union     Spouse name: Not on file   • Number of children: Not on file   • Years of education: Not on file   • Highest education level: Not on file   Occupational History   • Not on file   Social Needs   • Financial resource strain: Not on file   • Food insecurity:     Worry: Not on file     Inability: Not on file   • Transportation needs:      Medical: Not on file     Non-medical: Not on file   Tobacco Use   • Smoking status: Never Smoker   • Smokeless tobacco: Never Used   Substance and Sexual Activity   • Alcohol use: No     Alcohol/week: 0.0 standard drinks     Frequency: Never     Drinks per session: 1 or 2     Binge frequency: Never   • Drug use: No   • Sexual activity: Yes     Partners: Male   Lifestyle   • Physical activity:     Days per week: Not on file     Minutes per session: Not on file   • Stress: Not on file   Relationships   • Social connections:     Talks on phone: Not on file     Gets together: Not on file     Attends Nondenominational service: Not on file     Active member of club or organization: Not on file     Attends meetings of clubs or organizations: Not on file     Relationship status: Not on file   • Intimate partner violence:     Fear of current or ex partner: Not on file     Emotionally abused: Not on file     Physically abused: Not on file     Forced sexual activity: Not on file   Other Topics Concern   • Not on file   Social History Narrative   • Not on file       Allergies:  ALLERGIES:  No Known Allergies    Home Medications:  Patient to take supply of the following home medications:    Facility-Administered Medications Prior to Admission   Medication Dose Route Frequency Provider Last Rate Last Dose   • cyanocobalamin injection 1,000 mcg  1,000 mcg Intramuscular Q30 Days Zaria Keller MD   1,000 mcg at 07/15/19 1210     Medications Prior to Admission   Medication Sig Dispense Refill   • tolterodine (DETROL LA) 4 MG 24 hr capsule Take 1 capsule by mouth daily. 30 capsule 2   • atorvastatin (LIPITOR) 20 MG tablet TAKE ONE TABLET BY MOUTH DAILY 90 tablet 1   • sitaGLIPTIN-metFORMIN (JANUMET)  MG per tablet Take 1 tablet by mouth 2 times daily 180 tablet 3   • glimepiride (AMARYL) 2 MG tablet Take 1 tablet by mouth 2 times daily. 180 tablet 3   • levothyroxine (SYNTHROID, LEVOTHROID) 50 MCG tablet Take 1 tablet by mouth  daily (before breakfast). 90 tablet 3   • ibuprofen (MOTRIN) 600 MG tablet Take 1 tablet by mouth every 6 hours as needed for Pain. 30 tablet 0   • Multiple Vitamins-Minerals (MULTIVITAMIN ADULT PO)      • vitamin D, Cholecalciferol, 1000 units capsule Take 1,000 Units by mouth daily.     • Lancets (FREESTYLE) Misc TEST BLOOD SUGAR FOUR TIMES A DAY AS DIRECTED. E 11.65 dm2 400 each 3   • blood glucose test strip Test blood sugar 4 times daily as directed. Diagnosis: E11.65. Meter: Insurance preference 400 each 3   • blood glucose meter Test blood sugar 4 times daily as directed. Diagnosis: E11.65. Meter: FreeStyle 1 kit 0       Please update the MAR labels to indicate \"Patient to take home supply.\"      ROS:   Twelve points were reviewed  GENERAL: Denies any chills, fever.  RESPIRATORY: Denies shortness of breath, denies hemoptysis.     CARDIOVASCULAR: Denies chest pain, palpitations.     GASTROINTESTINAL: Denies nausea, vomiting.     GENITOURINARY: Denies dysuria, hematuria.     MUSCULOSKELETAL: Trace edema.     ENDOCRINE: Denies heat or cold intolerance.     HEENT: Negative.     SKIN: Denies skin rash or itching.     IMMUNOLOGICAL: Denies allergies  PSYCHIATRIC: Denies any delusions, hallucinations.     CENTRAL NERVOUS SYSTEM: Denies any new stroke-like symptoms, vertigo or diplopia.      Physical Exam:  GENERAL: Alert and oriented x3, interacts very well, follows commands. Memory, mood and affect are at baseline.    Visit Vitals  /65 (BP Location: RUE - Right upper extremity, Patient Position: Semi-Staton's)   Pulse 75   Temp 98.4 °F (36.9 °C) (Oral)   Resp 18   Ht 5' 5\" (1.651 m)   Wt 75.3 kg   SpO2 96%   BMI 27.62 kg/m²       SKIN: Warm and dry.  HEENT: Both pupils are equal and responding to light. Nose: Both nostrils patent.  EXTREMITIES: Trace edema. Pedal pulses are very palpable.  NECK: Supple. Lymph nodes are not felt in the neck region.       LUNGS:  Lungs show coarse breath sounds bilaterally with  moist crackles  CARDIOVASCULAR: S1, S2 with displaced PMI.  ABDOMEN: Soft, bowel sounds positive, nontender.  RECTAL AND PELVIC: Not performed.  CENTRAL NERVOUS SYSTEM: Cranial nerves are grossly intact 2-12.  Gait not assessed.       Labs:  Recent Labs   Lab 04/12/20  1150   WBC 5.9   RBC 3.16*   HGB 8.6*   HCT 25.8*        Recent Labs   Lab 04/12/20  1150   SODIUM 134*   POTASSIUM 5.1   CHLORIDE 105   CO2 18*   BUN 61*   CREATININE 1.02*   GLUCOSE 387*   CALCIUM 7.7*         Impression:    K92.2 Gastrointestinal hemorrhage, unspecified  D64.9 Anemia, unspecified  N17.9 Acute kidney failure, unspecified  E11.8 Type 2 Diabetes Mellitus with unspecified complications  I10 Essential Hypertension  E03.9 Hypothyroidism, unspecified    E78.5 Hyperlipidemia       Plan:  Admit as inpatient   Length of stay greater than two midnights  Continue with Protonix infusion and Carafate   Scheduled for EGD today   Monitor H&H and stools   Avoid NSAIDs   Continue with SSI   Continue with Lipitor and Synthroid   Further recommendations pending EGD results   Monitor elevated creatinine  Transfuse packed rbc as indicated.    Keri Dickson/Sam Reinoso MD       normal...

## 2021-06-20 NOTE — ED ADULT NURSE NOTE - NSIMPLEMENTINTERV_GEN_ALL_ED
Implemented All Universal Safety Interventions:  [place] to call system. Call bell, personal items and telephone within reach. Instruct patient to call for assistance. Room bathroom lighting operational. Non-slip footwear when patient is off stretcher. Physically safe environment: no spills, clutter or unnecessary equipment. Stretcher in lowest position, wheels locked, appropriate side rails in place.

## 2021-06-20 NOTE — ED PROVIDER NOTE - PHYSICAL EXAMINATION
msk/skin: +right hip surgical site is well appearing with staples in place, no signs of infection noted   + ecchymosis up the right flank and down right hip and thigh.

## 2021-06-20 NOTE — ED PROVIDER NOTE - PATIENT PORTAL LINK FT
You can access the FollowMyHealth Patient Portal offered by [facility] by registering at the following website: [email or web address]. By joining [employer]’s FollowMyHealth portal, you will also be able to view your health information using other applications (apps) compatible with our system.

## 2021-06-20 NOTE — ED PROVIDER NOTE - RESPIRATORY, MLM
Patient was instructed to come in today to be scanned but it was a mistake and she should actually come later this week. Patient was rescheduled. Dominant follicle was ~10 at most   Breath sounds clear and equal bilaterally. SPO2 is 93-96% on RA. + using abd muscles to breath, but speaking in full sentences

## 2021-06-20 NOTE — ED ADULT TRIAGE NOTE - CHIEF COMPLAINT QUOTE
BIB EMS for evaluation of low O2 sat and SOB. As per EMS, pt noted to be 92% on room air at [facility]. Pt VSS in triage, no resp distress noted. BIB EMS for evaluation of low O2 sat and SOB. As per EMS, pt noted to be 92% on room air at [facility]. Pt VSS in triage, no resp distress noted. ISAR positive.

## 2021-06-20 NOTE — ED PROVIDER NOTE - OBJECTIVE STATEMENT
88 y/o M with PMH of Afib on Eliquis, CVA, CHF displaced subtrochanteric fx with recent ORIF at [facility] was BIB EMS from [facility] for SOB last night, resolved today. Pt denies CP, f/c, cough, URI sympts abd pain, n/v or other complaints. 88 y/o M with PMH of Afib on Eliquis, CVA, CHF displaced subtrochanteric fx with recent ORIF at [facility] was BIB EMS from [facility] for SOB last night, resolved today. Pt attributes SOB to his positioning last night but today, states it feels better. Pt denies CP, f/c, cough, URI sympts abd pain, n/v or other complaints.

## 2021-06-20 NOTE — ED PROVIDER NOTE - CLINICAL SUMMARY MEDICAL DECISION MAKING FREE TEXT BOX
86 y/o M with PMH of Afib on Eliquis, CVA, CHF displaced subtrochanteric fx with recent ORIF at [facility] was BIB EMS from [facility] for SOB last night, resolved today. Pt denies CP, f/c, cough, URI sympts abd pain, n/v or other complaints. PE as noted above. labs pending. CXR Pending. will observe in the ED and reassess 86 y/o M with PMH of Afib on Eliquis, CVA, CHF displaced subtrochanteric fx with recent ORIF at [facility] was BIB EMS from [facility] for SOB last night, resolved today. Pt denies CP, f/c, cough, URI sympts abd pain, n/v or other complaints. PE as noted above. labs pending. CXR Pending. will observe in the ED and reassess  Given recent surgery and mod pleural effusion, will further assess with CT angio to r/o PE. 86 y/o M with PMH of Afib on Eliquis, CVA, CHF displaced subtrochanteric fx with recent ORIF at [facility] was BIB EMS from [facility] for SOB last night, resolved today. Pt denies CP, f/c, cough, URI sympts abd pain, n/v or other complaints. PE as noted above. labs pending. CXR Pending. will observe in the ED and reassess  Given recent surgery and mod pleural effusion, will further assess with CT angio to r/o PE.    441pm: labs reviewed. CXR results reviewed. CTA chest results reviewed- no PE, small pleural effusion noted. No desaturations noted during ER stay. pt stable for dc back to SNF

## 2021-06-20 NOTE — ED PROVIDER NOTE - ATTENDING CONTRIBUTION TO CARE
Gerber with ARMAAN Franco. 88 y/o M with PMH of Afib on Eliquis, CVA, CHF displaced subtrochanteric fx with recent ORIF at [facility] was BIB EMS from [facility] for SOB last night, resolved today. Pt denies CP, f/c, cough, URI sympts abd pain, n/v or other complaints. PE as noted above. labs pending. CXR Pending. will observe in the ED and reassess  Given recent surgery and mod pleural effusion, will further assess with CT angio to r/o PE.     I performed a face to face bedside interview with patient regarding history of present illness, review of symptoms and past medical history. I completed an independent physical exam.  I have discussed the patient's plan of care with Physician Assistant (PA). I agree with note as stated above, having amended the EMR as needed to reflect my findings.   This includes History of Present Illness, HIV, Past Medical/Surgical/Family/Social History, Allergies and Home Medications, Review of Systems, Physical Exam, and any Progress Notes during the time I functioned as the attending physician for this patient.

## 2021-06-20 NOTE — ED ADULT NURSE NOTE - CHIEF COMPLAINT QUOTE
BIB EMS for evaluation of low O2 sat and SOB. As per EMS, pt noted to be 92% on room air at [facility]. Pt VSS in triage, no resp distress noted. ISAR positive.

## 2021-06-20 NOTE — ED PROVIDER NOTE - NSFOLLOWUPINSTRUCTIONS_ED_ALL_ED_FT
Follow up with your primary care physician within 2-3 days. Take the copy of your test results you were given in the emergency room for your doctor to review.     Stay hydrated    Return to the ER if your symptoms worsen or for any other medical emergencies  **********************    Shortness of breath    Shortness of breath (dyspnea) means you have trouble breathing and could indicate a medical problem. Causes include lung disease, heart disease, low amount of red blood cells (anemia), poor physical fitness, being overweight, smoking, etc. Your health care provider today may not be able to find a cause for your shortness of breath after your exam. In this case, it is important to have a follow-up exam with your primary care physician as instructed. If medicines were prescribed, take them as directed for the full length of time directed. Refrain from tobacco products.    SEEK IMMEDIATE MEDICAL CARE IF YOU HAVE ANY OF THE FOLLOWING SYMPTOMS: worsening shortness of breath, chest pain, back pain, abdominal pain, fever, coughing up blood, lightheadedness/dizziness.

## 2021-09-01 NOTE — ED PROVIDER NOTE - AGGRAVATING FACTORS
none Complex Repair And Dorsal Nasal Flap Text: The defect edges were debeveled with a #15 scalpel blade.  The primary defect was closed partially with a complex linear closure.  Given the location of the remaining defect, shape of the defect and the proximity to free margins a dorsal nasal flap was deemed most appropriate for complete closure of the defect.  Using a sterile surgical marker, an appropriate flap was drawn incorporating the defect and placing the expected incisions within the relaxed skin tension lines where possible.    The area thus outlined was incised deep to adipose tissue with a #15 scalpel blade.  The skin margins were undermined to an appropriate distance in all directions utilizing iris scissors.

## 2021-10-14 NOTE — DIETITIAN INITIAL EVALUATION ADULT. - PATIENT MEETS CRITERIA FOR MALNUTRITION
EXAM DESCRIPTION:  RAD - Chest Single View - 10/14/2021 1:15 pm

 

CLINICAL HISTORY:  CHEST PAIN

Chest pain.

 

COMPARISON:  No comparisons

 

FINDINGS:  Portable technique limits examination quality.

 

The lungs are grossly clear. The heart is normal in size. No displaced fractures.

 

IMPRESSION:  No acute intrathoracic process suspected. no

## 2022-05-02 ENCOUNTER — APPOINTMENT (OUTPATIENT)
Dept: NEUROLOGY | Facility: CLINIC | Age: 87
End: 2022-05-02

## 2022-06-13 NOTE — ED PROVIDER NOTE - CPE EDP GASTRO NORM
This is an 84 year old male with a PMH significant for CKD III, HTN, obesity, and reported diverticulosis who presented on 06/12/22 with approximately three day duration of painless hematochezia. He is currently hemodynamically stable with Hgb 10.1 and Hct 31.5. Etiology of presentation suspected to be from recurrent diverticular bleeding.     Reccommendations:    Maintain IV access with at least 2 large IVs (18 gauge or larger)   Continued IVF resuscitation as tolerated with attention to active co-morbidities.    RBC transfusion as indicated if Hgb <7 g/dL.    Please correct any coagulopathy with platelets and FFP to a goal of platelets >50K and INR <2.0.    Discontinue all antiplatelet, anti-coagulation, and NSAID products.    Will continue to monitor for chance of spontaneous resolution before decision regarding need for repeat colonoscopy.    Please obtain colonoscopy report from [facility].    Continue regular diet as tolerated     normal...

## 2022-09-14 NOTE — ED ADULT NURSE NOTE - NS TRANSFER PATIENT BELONGINGS
Render Risk Assessment In Note?: no Additional Notes: Pt has been prescribed triamcinolone for face and body in the past. Detail Level: Simple Clothing

## 2022-11-09 NOTE — ED PROVIDER NOTE - NS ED MD DISPO DISCHARGE CCDA
Patient/Caregiver provided printed discharge information. Cimetidine Counseling:  I discussed with the patient the risks of Cimetidine including but not limited to gynecomastia, headache, diarrhea, nausea, drowsiness, arrhythmias, pancreatitis, skin rashes, psychosis, bone marrow suppression and kidney toxicity.

## 2022-12-17 ENCOUNTER — INPATIENT (INPATIENT)
Facility: HOSPITAL | Age: 87
LOS: 16 days | Discharge: NURSING, REHAB OR LONG-TERM CARE | DRG: 280 | End: 2023-01-03
Attending: STUDENT IN AN ORGANIZED HEALTH CARE EDUCATION/TRAINING PROGRAM | Admitting: HOSPITALIST
Payer: MEDICARE

## 2022-12-17 VITALS
OXYGEN SATURATION: 89 % | SYSTOLIC BLOOD PRESSURE: 171 MMHG | HEART RATE: 112 BPM | DIASTOLIC BLOOD PRESSURE: 147 MMHG | TEMPERATURE: 99 F | HEIGHT: 67 IN | WEIGHT: 171.08 LBS | RESPIRATION RATE: 26 BRPM

## 2022-12-17 DIAGNOSIS — I50.9 HEART FAILURE, UNSPECIFIED: ICD-10-CM

## 2022-12-17 LAB
ALBUMIN SERPL ELPH-MCNC: 3.7 G/DL — SIGNIFICANT CHANGE UP (ref 3.3–5)
ALP SERPL-CCNC: 68 U/L — SIGNIFICANT CHANGE UP (ref 40–120)
ALT FLD-CCNC: 13 U/L — SIGNIFICANT CHANGE UP (ref 10–45)
ANION GAP SERPL CALC-SCNC: 11 MMOL/L — SIGNIFICANT CHANGE UP (ref 5–17)
ANION GAP SERPL CALC-SCNC: 14 MMOL/L — SIGNIFICANT CHANGE UP (ref 5–17)
ANION GAP SERPL CALC-SCNC: 14 MMOL/L — SIGNIFICANT CHANGE UP (ref 5–17)
AST SERPL-CCNC: 25 U/L — SIGNIFICANT CHANGE UP (ref 10–40)
BASE EXCESS BLDA CALC-SCNC: 0.7 MMOL/L — SIGNIFICANT CHANGE UP (ref -2–3)
BASE EXCESS BLDV CALC-SCNC: 0.2 MMOL/L — SIGNIFICANT CHANGE UP (ref -2–3)
BASOPHILS # BLD AUTO: 0.03 K/UL — SIGNIFICANT CHANGE UP (ref 0–0.2)
BASOPHILS NFR BLD AUTO: 0.3 % — SIGNIFICANT CHANGE UP (ref 0–2)
BILIRUB SERPL-MCNC: 0.7 MG/DL — SIGNIFICANT CHANGE UP (ref 0.2–1.2)
BUN SERPL-MCNC: 19 MG/DL — SIGNIFICANT CHANGE UP (ref 7–23)
BUN SERPL-MCNC: 22 MG/DL — SIGNIFICANT CHANGE UP (ref 7–23)
BUN SERPL-MCNC: 24 MG/DL — HIGH (ref 7–23)
CALCIUM SERPL-MCNC: 8.5 MG/DL — SIGNIFICANT CHANGE UP (ref 8.4–10.5)
CALCIUM SERPL-MCNC: 8.6 MG/DL — SIGNIFICANT CHANGE UP (ref 8.4–10.5)
CALCIUM SERPL-MCNC: 9.1 MG/DL — SIGNIFICANT CHANGE UP (ref 8.4–10.5)
CHLORIDE SERPL-SCNC: 102 MMOL/L — SIGNIFICANT CHANGE UP (ref 96–108)
CHLORIDE SERPL-SCNC: 104 MMOL/L — SIGNIFICANT CHANGE UP (ref 96–108)
CHLORIDE SERPL-SCNC: 105 MMOL/L — SIGNIFICANT CHANGE UP (ref 96–108)
CK MB BLD-MCNC: 2.2 % — SIGNIFICANT CHANGE UP (ref 0–3.5)
CK MB BLD-MCNC: 2.3 % — SIGNIFICANT CHANGE UP (ref 0–3.5)
CK MB CFR SERPL CALC: 1.7 NG/ML — SIGNIFICANT CHANGE UP (ref 0.5–10)
CK MB CFR SERPL CALC: 1.9 NG/ML — SIGNIFICANT CHANGE UP (ref 0.5–10)
CK SERPL-CCNC: 74 U/L — SIGNIFICANT CHANGE UP (ref 30–200)
CK SERPL-CCNC: 86 U/L — SIGNIFICANT CHANGE UP (ref 30–200)
CO2 BLDA-SCNC: 27 MMOL/L — HIGH (ref 19–24)
CO2 BLDV-SCNC: 28 MMOL/L — HIGH (ref 22–26)
CO2 SERPL-SCNC: 23 MMOL/L — SIGNIFICANT CHANGE UP (ref 22–31)
CO2 SERPL-SCNC: 28 MMOL/L — SIGNIFICANT CHANGE UP (ref 22–31)
CO2 SERPL-SCNC: 29 MMOL/L — SIGNIFICANT CHANGE UP (ref 22–31)
CREAT SERPL-MCNC: 1.32 MG/DL — HIGH (ref 0.5–1.3)
CREAT SERPL-MCNC: 1.43 MG/DL — HIGH (ref 0.5–1.3)
CREAT SERPL-MCNC: 1.44 MG/DL — HIGH (ref 0.5–1.3)
EGFR: 47 ML/MIN/1.73M2 — LOW
EGFR: 47 ML/MIN/1.73M2 — LOW
EGFR: 52 ML/MIN/1.73M2 — LOW
EOSINOPHIL # BLD AUTO: 0.14 K/UL — SIGNIFICANT CHANGE UP (ref 0–0.5)
EOSINOPHIL NFR BLD AUTO: 1.4 % — SIGNIFICANT CHANGE UP (ref 0–6)
FLUAV AG NPH QL: DETECTED
FLUBV AG NPH QL: SIGNIFICANT CHANGE UP
GAS PNL BLDA: SIGNIFICANT CHANGE UP
GAS PNL BLDV: SIGNIFICANT CHANGE UP
GLUCOSE SERPL-MCNC: 102 MG/DL — HIGH (ref 70–99)
GLUCOSE SERPL-MCNC: 104 MG/DL — HIGH (ref 70–99)
GLUCOSE SERPL-MCNC: 144 MG/DL — HIGH (ref 70–99)
HCO3 BLDA-SCNC: 26 MMOL/L — SIGNIFICANT CHANGE UP (ref 21–28)
HCO3 BLDV-SCNC: 26 MMOL/L — SIGNIFICANT CHANGE UP (ref 22–29)
HCT VFR BLD CALC: 43.2 % — SIGNIFICANT CHANGE UP (ref 39–50)
HGB BLD-MCNC: 14 G/DL — SIGNIFICANT CHANGE UP (ref 13–17)
HOROWITZ INDEX BLDA+IHG-RTO: 40 — SIGNIFICANT CHANGE UP
IMM GRANULOCYTES NFR BLD AUTO: 0.5 % — SIGNIFICANT CHANGE UP (ref 0–0.9)
LYMPHOCYTES # BLD AUTO: 2.04 K/UL — SIGNIFICANT CHANGE UP (ref 1–3.3)
LYMPHOCYTES # BLD AUTO: 20.7 % — SIGNIFICANT CHANGE UP (ref 13–44)
MAGNESIUM SERPL-MCNC: 1.9 MG/DL — SIGNIFICANT CHANGE UP (ref 1.6–2.6)
MAGNESIUM SERPL-MCNC: 2.1 MG/DL — SIGNIFICANT CHANGE UP (ref 1.6–2.6)
MCHC RBC-ENTMCNC: 32.4 GM/DL — SIGNIFICANT CHANGE UP (ref 32–36)
MCHC RBC-ENTMCNC: 32.5 PG — SIGNIFICANT CHANGE UP (ref 27–34)
MCV RBC AUTO: 100.2 FL — HIGH (ref 80–100)
MONOCYTES # BLD AUTO: 1.46 K/UL — HIGH (ref 0–0.9)
MONOCYTES NFR BLD AUTO: 14.8 % — HIGH (ref 2–14)
NEUTROPHILS # BLD AUTO: 6.14 K/UL — SIGNIFICANT CHANGE UP (ref 1.8–7.4)
NEUTROPHILS NFR BLD AUTO: 62.3 % — SIGNIFICANT CHANGE UP (ref 43–77)
NRBC # BLD: 0 /100 WBCS — SIGNIFICANT CHANGE UP (ref 0–0)
NT-PROBNP SERPL-SCNC: 6085 PG/ML — HIGH (ref 0–300)
PCO2 BLDA: 45 MMHG — SIGNIFICANT CHANGE UP (ref 35–48)
PCO2 BLDV: 51 MMHG — SIGNIFICANT CHANGE UP (ref 42–55)
PH BLDA: 7.37 — SIGNIFICANT CHANGE UP (ref 7.35–7.45)
PH BLDV: 7.32 — SIGNIFICANT CHANGE UP (ref 7.32–7.43)
PHOSPHATE SERPL-MCNC: 4.6 MG/DL — HIGH (ref 2.5–4.5)
PLATELET # BLD AUTO: 167 K/UL — SIGNIFICANT CHANGE UP (ref 150–400)
PO2 BLDA: 87 MMHG — SIGNIFICANT CHANGE UP (ref 83–108)
PO2 BLDV: 66 MMHG — HIGH (ref 25–45)
POTASSIUM SERPL-MCNC: 3.5 MMOL/L — SIGNIFICANT CHANGE UP (ref 3.5–5.3)
POTASSIUM SERPL-SCNC: 3.5 MMOL/L — SIGNIFICANT CHANGE UP (ref 3.5–5.3)
PROCALCITONIN SERPL-MCNC: 0.23 NG/ML — HIGH
PROT SERPL-MCNC: 8.2 G/DL — SIGNIFICANT CHANGE UP (ref 6–8.3)
RBC # BLD: 4.31 M/UL — SIGNIFICANT CHANGE UP (ref 4.2–5.8)
RBC # FLD: 13.4 % — SIGNIFICANT CHANGE UP (ref 10.3–14.5)
RSV RNA NPH QL NAA+NON-PROBE: SIGNIFICANT CHANGE UP
SAO2 % BLDA: 97 % — SIGNIFICANT CHANGE UP (ref 94–98)
SAO2 % BLDV: 92 % — HIGH (ref 67–88)
SARS-COV-2 RNA SPEC QL NAA+PROBE: SIGNIFICANT CHANGE UP
SODIUM SERPL-SCNC: 141 MMOL/L — SIGNIFICANT CHANGE UP (ref 135–145)
SODIUM SERPL-SCNC: 144 MMOL/L — SIGNIFICANT CHANGE UP (ref 135–145)
SODIUM SERPL-SCNC: 145 MMOL/L — SIGNIFICANT CHANGE UP (ref 135–145)
TROPONIN I, HIGH SENSITIVITY RESULT: 100.3 NG/L — HIGH
TROPONIN I, HIGH SENSITIVITY RESULT: 104.1 NG/L — HIGH
TROPONIN I, HIGH SENSITIVITY RESULT: 109.3 NG/L — HIGH
TROPONIN I, HIGH SENSITIVITY RESULT: 93.6 NG/L — HIGH
WBC # BLD: 9.86 K/UL — SIGNIFICANT CHANGE UP (ref 3.8–10.5)
WBC # FLD AUTO: 9.86 K/UL — SIGNIFICANT CHANGE UP (ref 3.8–10.5)

## 2022-12-17 PROCEDURE — 93306 TTE W/DOPPLER COMPLETE: CPT | Mod: 26

## 2022-12-17 PROCEDURE — 99291 CRITICAL CARE FIRST HOUR: CPT

## 2022-12-17 PROCEDURE — 71045 X-RAY EXAM CHEST 1 VIEW: CPT | Mod: 26

## 2022-12-17 PROCEDURE — 99222 1ST HOSP IP/OBS MODERATE 55: CPT

## 2022-12-17 PROCEDURE — 99223 1ST HOSP IP/OBS HIGH 75: CPT

## 2022-12-17 RX ORDER — ASPIRIN/CALCIUM CARB/MAGNESIUM 324 MG
325 TABLET ORAL ONCE
Refills: 0 | Status: COMPLETED | OUTPATIENT
Start: 2022-12-17 | End: 2022-12-17

## 2022-12-17 RX ORDER — NITROGLYCERIN 6.5 MG
0.4 CAPSULE, EXTENDED RELEASE ORAL ONCE
Refills: 0 | Status: COMPLETED | OUTPATIENT
Start: 2022-12-17 | End: 2022-12-17

## 2022-12-17 RX ORDER — MAGNESIUM SULFATE 500 MG/ML
2 VIAL (ML) INJECTION ONCE
Refills: 0 | Status: COMPLETED | OUTPATIENT
Start: 2022-12-17 | End: 2022-12-17

## 2022-12-17 RX ORDER — POTASSIUM CHLORIDE 20 MEQ
40 PACKET (EA) ORAL ONCE
Refills: 0 | Status: COMPLETED | OUTPATIENT
Start: 2022-12-17 | End: 2022-12-17

## 2022-12-17 RX ORDER — SENNA PLUS 8.6 MG/1
2 TABLET ORAL AT BEDTIME
Refills: 0 | Status: DISCONTINUED | OUTPATIENT
Start: 2022-12-17 | End: 2023-01-03

## 2022-12-17 RX ORDER — FUROSEMIDE 40 MG
40 TABLET ORAL
Refills: 0 | Status: DISCONTINUED | OUTPATIENT
Start: 2022-12-17 | End: 2022-12-20

## 2022-12-17 RX ORDER — NITROGLYCERIN 6.5 MG
5 CAPSULE, EXTENDED RELEASE ORAL
Qty: 50 | Refills: 0 | Status: DISCONTINUED | OUTPATIENT
Start: 2022-12-17 | End: 2022-12-19

## 2022-12-17 RX ORDER — FUROSEMIDE 40 MG
80 TABLET ORAL ONCE
Refills: 0 | Status: COMPLETED | OUTPATIENT
Start: 2022-12-17 | End: 2022-12-17

## 2022-12-17 RX ORDER — POTASSIUM CHLORIDE 20 MEQ
10 PACKET (EA) ORAL
Refills: 0 | Status: COMPLETED | OUTPATIENT
Start: 2022-12-17 | End: 2022-12-18

## 2022-12-17 RX ORDER — CHLORHEXIDINE GLUCONATE 213 G/1000ML
1 SOLUTION TOPICAL
Refills: 0 | Status: DISCONTINUED | OUTPATIENT
Start: 2022-12-17 | End: 2022-12-22

## 2022-12-17 RX ORDER — APIXABAN 2.5 MG/1
5 TABLET, FILM COATED ORAL
Refills: 0 | Status: DISCONTINUED | OUTPATIENT
Start: 2022-12-17 | End: 2022-12-22

## 2022-12-17 RX ORDER — ASPIRIN/CALCIUM CARB/MAGNESIUM 324 MG
81 TABLET ORAL DAILY
Refills: 0 | Status: DISCONTINUED | OUTPATIENT
Start: 2022-12-17 | End: 2023-01-03

## 2022-12-17 RX ORDER — LABETALOL HCL 100 MG
10 TABLET ORAL ONCE
Refills: 0 | Status: COMPLETED | OUTPATIENT
Start: 2022-12-17 | End: 2022-12-17

## 2022-12-17 RX ORDER — METOPROLOL TARTRATE 50 MG
50 TABLET ORAL DAILY
Refills: 0 | Status: DISCONTINUED | OUTPATIENT
Start: 2022-12-17 | End: 2022-12-18

## 2022-12-17 RX ORDER — ACETAMINOPHEN 500 MG
1000 TABLET ORAL ONCE
Refills: 0 | Status: COMPLETED | OUTPATIENT
Start: 2022-12-17 | End: 2022-12-17

## 2022-12-17 RX ORDER — POTASSIUM CHLORIDE 20 MEQ
10 PACKET (EA) ORAL
Refills: 0 | Status: COMPLETED | OUTPATIENT
Start: 2022-12-17 | End: 2022-12-17

## 2022-12-17 RX ADMIN — SENNA PLUS 2 TABLET(S): 8.6 TABLET ORAL at 23:13

## 2022-12-17 RX ADMIN — Medication 80 MILLIGRAM(S): at 07:10

## 2022-12-17 RX ADMIN — Medication 40 MILLIEQUIVALENT(S): at 21:31

## 2022-12-17 RX ADMIN — Medication 1000 MILLIGRAM(S): at 08:34

## 2022-12-17 RX ADMIN — APIXABAN 5 MILLIGRAM(S): 2.5 TABLET, FILM COATED ORAL at 21:18

## 2022-12-17 RX ADMIN — Medication 25 GRAM(S): at 21:16

## 2022-12-17 RX ADMIN — Medication 0.4 MILLIGRAM(S): at 10:32

## 2022-12-17 RX ADMIN — Medication 400 MILLIGRAM(S): at 08:19

## 2022-12-17 RX ADMIN — Medication 0.4 MILLIGRAM(S): at 07:11

## 2022-12-17 RX ADMIN — Medication 100 MILLIEQUIVALENT(S): at 20:30

## 2022-12-17 RX ADMIN — Medication 325 MILLIGRAM(S): at 10:38

## 2022-12-17 RX ADMIN — Medication 100 MILLIEQUIVALENT(S): at 18:02

## 2022-12-17 RX ADMIN — Medication 40 MILLIGRAM(S): at 14:01

## 2022-12-17 RX ADMIN — Medication 1.5 MICROGRAM(S)/MIN: at 16:43

## 2022-12-17 RX ADMIN — Medication 100 MILLIEQUIVALENT(S): at 22:11

## 2022-12-17 RX ADMIN — Medication 1000 MILLIGRAM(S): at 08:49

## 2022-12-17 RX ADMIN — Medication 10 MILLIGRAM(S): at 14:25

## 2022-12-17 NOTE — CONSULT NOTE ADULT - NS ATTEND AMEND GEN_ALL_CORE FT
Patient seen and examined  comfortable on n/c  with normal WBC unsure if acute pneumonia   will f/u cxr in am  trend cardiac enzymes  raegan flu  Cardio f/u  d/w ED MD   at this time can monitor in tele. will f/u with patient in am.

## 2022-12-17 NOTE — H&P ADULT - NSHPPHYSICALEXAM_GEN_ALL_CORE
Vital Signs Last 24 Hrs  T(F): 99.3 (17 Dec 2022 06:41), Max: 99.3 (17 Dec 2022 06:41)  HR: 66 (17 Dec 2022 11:10) (60 - 112)  BP: 168/77 (17 Dec 2022 11:10) (132/67 - 198/89)  RR: 25 (17 Dec 2022 11:10) (21 - 29)  SpO2: 98% (17 Dec 2022 11:10) (89% - 100%)    PHYSICAL EXAM:  GENERAL: NAD  HEAD:  Atraumatic, Normocephalic  EYES: EOMI, conjunctiva and sclera clear  ENMT: No gross hearing impairment, Moist mucous membranes, Good dentition, no thrush  NECK: Supple, No JVD  CHEST/LUNG: Clear to auscultation bilaterally, good air entry, non-labored breathing  HEART: RRR; S1/S2, No murmur  ABDOMEN: Soft, Nontender, Nondistended; Bowel sounds present  VASCULAR: Normal pulses, Normal capillary refill  EXTREMITIES: No calf tenderness, No cyanosis, No pitting edema  LYMPH: Normal; No lymphadenopathy noted  SKIN: Warm, Intact, No rashes noted  PSYCH: Normal mood, Normal affect  NERVOUS SYSTEM:  A/O x3, Good concentration; CN 2-12 intact, No focal deficits

## 2022-12-17 NOTE — CONSULT NOTE ADULT - SUBJECTIVE AND OBJECTIVE BOX
Chief Complaint: sob    HPI:88 yr old man followed by Dr SIMPSON WITH HX OF AF  PRESENTS WITH ACUTE ON CHRONIC SOB. Patient admits to left sided sharp chest pain earlier today for several minutes, says he has a hx of chf. deneis hx of mi . denies stents or cabg denies diabetes. has chronically been on bblocker arb amlodipine lasix and apixaban    PMH:   Afib    Congestive heart failure (CHF)    CVA (cerebral vascular accident)    Hypertension, unspecified type      PSH:   No significant past surgical history      Family History:  FAMILY HISTORY:  No pertinent family history in first degree relatives    No pertinent family history in first degree relatives        Social History:  Smokinppd x 20 yrs stopped in   Alcohol:occ  Drugs:denies    Allergies:  No Known Allergies      Medications:  apixaban 5 milliGRAM(s) Oral two times a day  aspirin enteric coated 81 milliGRAM(s) Oral daily  furosemide   Injectable 40 milliGRAM(s) IV Push two times a day  metoprolol succinate ER 50 milliGRAM(s) Oral daily  senna 2 Tablet(s) Oral at bedtime      REVIEW OF SYSTEMS:  CONSTITUTIONAL: No fever, weight loss, or fatigue  EYES: No eye pain, visual disturbances, or discharge  ENMT:  No difficulty hearing, tinnitus, vertigo; No sinus or throat pain  NECK: No pain or stiffness  BREASTS: No pain, masses, or nipple discharge  RESPIRATORY: see hpi  CARDIOVASCULAR: see hpi  GASTROINTESTINAL: No abdominal or epigastric pain. No nausea, vomiting, or hematemesis; No diarrhea or constipation. No melena or hematochezia.  GENITOURINARY: No dysuria, frequency, hematuria, or incontinence  NEUROLOGICAL: No headaches, memory loss, loss of strength, numbness, or tremors  SKIN: No itching, burning, rashes, or lesions   LYMPH NODES: No enlarged glands  ENDOCRINE: No heat or cold intolerance; No hair loss  MUSCULOSKELETAL: No joint pain or swelling; No muscle, back, or extremity pain  PSYCHIATRIC: No depression, anxiety, mood swings, or difficulty sleeping  HEME/LYMPH: No easy bruising, or bleeding gums  ALLERY AND IMMUNOLOGIC: No hives or eczema    Physical Exam:  T(C): 37.4 (22 @ 06:41), Max: 37.4 (22 @ 06:41)  HR: 66 (22 @ 11:10) (60 - 112)  BP: 168/77 (22 @ 11:10) (132/67 - 198/89)  RR: 25 (22 @ 11:10) (21 - 29)  SpO2: 98% (22 @ 11:10) (89% - 100%)  Wt(kg): --    GENERAL: NAD, well-groomed, well-developed  HEAD:  Atraumatic, Normocephalic  EYES: EOMI, conjunctiva and sclera clear  ENT: Moist mucous membranes,  NECK: Supple, No JVD, no bruits  CHEST/LUNG: scattered rales and rhonchi  HEART:irregularly irregular  ABDOMEN: Soft, Nontender, Nondistended; Bowel sounds present  EXTREMITIES: Trace edema  SKIN: No rashes or lesions  NERVOUS SYSTEM:  Alert & Oriented X3, Good concentration; Motor Strength 5/5 B/L upper and lower extremities; DTRs 2+ intact and symmetric    Cardiovascular Diagnostic Testing:  ECG: af with moderate response non specifc abn unchanged from 2021    Labs:                        14.0   9.86  )-----------( 167      ( 17 Dec 2022 07:00 )             43.2         141  |  104  |  19  ----------------------------<  144<H>  3.5   |  23  |  1.43<H>    Ca    9.1      17 Dec 2022 07:00    TPro  8.2  /  Alb  3.7  /  TBili  0.7  /  DBili  x   /  AST  25  /  ALT  13  /  AlkPhos  68  1217      CARDIAC MARKERS ( 17 Dec 2022 10:17 )  x     / x     / 74 U/L / x     / 1.7 ng/mL      Serum Pro-Brain Natriuretic Peptide: 6085 pg/mL ( @ 07:00)            Imaging:

## 2022-12-17 NOTE — CONSULT NOTE ADULT - ASSESSMENT
imp    chf at this time unknown whether or not it is with reduced or preserved ef  chronic af well rate controlled  elevated troponins-flat and with out new ekg abn or classic symptoms    suggest  for now agree with d/c of irbesartan but when iv diuresis is accomplished and pt euvolemic may be able to restart or even consider entresto  echo  f/u ekg  f/u troponin  f/u cxr

## 2022-12-17 NOTE — PROGRESS NOTE ADULT - SUBJECTIVE AND OBJECTIVE BOX
F/U Note:    88y Male admitted with HTN urgency, flash pulm. edema/CHF as well as Influenza A (+), required initation of BiPAP and nitro gtt    Interval Hx:  -currently on BiPAP and nitro gtt    Vital Signs Last 24 Hrs  T(C): 36.6 (17 Dec 2022 17:29), Max: 37.4 (17 Dec 2022 06:41)  T(F): 97.8 (17 Dec 2022 17:29), Max: 99.3 (17 Dec 2022 06:41)  HR: 88 (17 Dec 2022 19:00) (60 - 140)  BP: 178/100 (17 Dec 2022 19:00) (132/67 - 213/134)  BP(mean): 114 (17 Dec 2022 19:00) (87 - 194)  RR: 26 (17 Dec 2022 19:00) (21 - 33)  SpO2: 98% (17 Dec 2022 19:00) (89% - 100%)    Parameters below as of 17 Dec 2022 19:00  Patient On (Oxygen Delivery Method): BiPAP/CPAP    O2 Concentration (%): 50                            14.0   9.86  )-----------( 167      ( 17 Dec 2022 07:00 )             43.2         12-17    145  |  105  |  24<H>  ----------------------------<  102<H>  3.5   |  29  |  1.44<H>    Ca    8.5      17 Dec 2022 13:27  Mg     2.1     12-17    TPro  8.2  /  Alb  3.7  /  TBili  0.7  /  DBili  x   /  AST  25  /  ALT  13  /  AlkPhos  68  12-17        NEURO: no headaches, blurry vision, tremors, depression, anxity  CV: no chest pain, palpitations, murmurs, orthopnea  Resp: no shortness of breath, cough, wheeze, sputum production  GI: no stomach pain,nausea, vomitting, flatulence, hematemesis, hematochezia  PV: no swelling of extremities, no hair loss, no coolness to extremities  ENDO: no polydypsia, polyphagia, polyuria, weight loss, night sweats         NEURO: awake and alert  CV: (+) S1/S2, irregularly irregular, no MRG   RESP: CTA b/l  GI: soft, non tender

## 2022-12-17 NOTE — ED PROVIDER NOTE - OBJECTIVE STATEMENT
Tyrell STANFORD: 88-year-old male history of A. fib on Eliquis CHF with prior intubations and admission for CHF exacerbation prior CVA hypertension presents with a chief complaint of cough shortness of breath and dyspnea consistent with prior CHF exacerbations.  Patient is a limited historian she presents in respiratory distress some discomfort daughter providing most history at bedside.  Per daughter no fever.  Patient has had very similar episodes in the past that has been CHF exacerbation.  Patient is taking Lasix 40 twice a day.    No nausea no vomiting no fever daughter reports patient can no longer lay flat.  Daughter reports is getting worse and after a hip fracture he sustained a couple years ago.

## 2022-12-17 NOTE — ED ADULT NURSE NOTE - OBJECTIVE STATEMENT
Pt presents to ED with c/o SOB x several days.  Upon arrival, pt is SOB, tachycardic, tachypneic, hypertensive, and hypoxic.  Pt SOB, hypoxic on RA.  Placed on NRB, and transferred to BIPAP at 100% O2.  Pt tolerating bipap well.  EKG completed. Pt presents to ED with c/o SOB x several days.  Upon arrival, pt is SOB, tachycardic with HR of 112, tachypneic with RR of 26, hypertensive with BP of 171/141, and hypoxic.  Pt SOB, hypoxic on RA at 88%.  Noted to have pitting edema to BLE.  Placed on NRB, and transferred to BIPAP at 100% O2.  Pt tolerating bipap well.  EKG completed.

## 2022-12-17 NOTE — H&P ADULT - HISTORY OF PRESENT ILLNESS
88M PMH atrial fibrillation on eliquis, history of CVA, Hypertension presents for SOB.   88M PMH atrial fibrillation on eliquis, history of CVA, Hypertension presents for SOB.   Patient conversant is a poor historian.  Unable to provide much information relevant.  Initially seen after being titrated off BIPAP and feeling okay however while waiting in ER patient with increasing BP and suspected of going back into flash pulmonary edema secondary to uncontrolled hypertension and placed back on BIPAP and given additional dose of lasix and labetalol.      Discussed with ICU Pa who agreed to take patient to CCU.

## 2022-12-17 NOTE — ED PROVIDER NOTE - PHYSICAL EXAMINATION
Tyrell STANFORD:  VITALS: Initial triage and subsequent vitals have been reviewed by me.  GEN APPEARANCE: WDWN, alert and cooperative, non-toxic appearing and in Obvious respiratory distress belly breathing increased work of breathing  HEAD: Atraumatic, normocephalic   EYES: PERRLa, EOMI, vision grossly intact.   EARS: Gross hearing intact.   NOSE: No nasal discharge, no external evidence of epistaxis.   NECK: Supple  CV: RRR, S1S2, no c/r/m/g. No cyanosis. Extremities warm, well perfused. Cap refill <2 seconds. No bruits.   LUNGS: Diffuse Rales and crackles bilaterally  ABDOMEN: Soft, NTND. No guarding or rebound. No masses.   MSK/EXT: Spine appears normal, no spine point tenderness. No CVA ttp. Normal muscular development. Pelvis stable. No obvious joint or bony deformity, Bilateral lower extremity pitting edema  NEURO: Alert, follows commands. Weight bearing normal. Speech normal. Sensation and motor normal x4 extremities.   SKIN: Normal color for race, warm, dry and intact. No evidence of rash.  PSYCH: Normal mood and affect.

## 2022-12-17 NOTE — H&P ADULT - NSHPLABSRESULTS_GEN_ALL_CORE
LABS:                        14.0   9.86  )-----------( 167      ( 17 Dec 2022 07:00 )             43.2     12-17    141  |  104  |  19  ----------------------------<  144<H>  3.5   |  23  |  1.43<H>    Ca    9.1      17 Dec 2022 07:00    TPro  8.2  /  Alb  3.7  /  TBili  0.7  /  DBili  x   /  AST  25  /  ALT  13  /  AlkPhos  68  12-17    CAPILLARY BLOOD GLUCOSE    ABG - ( 17 Dec 2022 10:35 )  pH, Arterial: 7.37  pH, Blood: x     /  pCO2: 45    /  pO2: 87    / HCO3: 26    / Base Excess: 0.7   /  SaO2: 97.0      RADIOLOGY & ADDITIONAL TESTS:    Consultant(s) Notes Reviewed:  [x ] YES  [ ] NO  Care Discussed with Consultants/Other Providers [ x] YES  [ ] NO  Imaging Personally Reviewed:  [ ] YES  [ ] NO

## 2022-12-17 NOTE — PATIENT PROFILE ADULT - FALL HARM RISK - HARM RISK INTERVENTIONS

## 2022-12-17 NOTE — H&P ADULT - ASSESSMENT
88M PMH atrial fibrillation on eliquis, history of CVA, Hypertension presents for SOB suspected secondary to acute on chronic heart failure.    Acute on chronic heart failure (unclear diastolic/systolic)  Acute hypoxic respiratory failure secondary to above  Demand ischemia   - initially on BIPAP in ER but titrated off  - now on NC  - evaluated by ICU but improving so no need for ICU  - continue diuretics  - hold ARB given IRINEO  - cont metoprolol  - follow up TTE  - cardiology consult  - titrate off O2 as tolerated  - suspected elevated troponins secondary to heart failure exacerbation    Acute kidney injury on CKD stage 3  - baseline around 1-1.1  - currently 1.43  - monitor bmp and potassium    Chronic atrial fibrillation  - cont metoprolol and eliquis    DVT PPx  - on eliquis for afib

## 2022-12-17 NOTE — CONSULT NOTE ADULT - SUBJECTIVE AND OBJECTIVE BOX
Mr. Antony is an 88 year old male with a PMH of CVA, CHF, HTN, afib on eliquis, hip fx s/p fall and prior intubation for HF exacerbation who presented to [facility] ED with c/o dyspnea and CP. Initially in ED pt was noted to have increased work of breathing and was hypoxic on room air with abdominal breathing and SBP into 200's. He was tx with NIV, Lasix and nitro at that time with + response. Pt daughter denied prior fever, however, pt now positive for influenza A. ICU team consulted.       Allergies  No Known Allergies      MEDICATIONS  (STANDING):  nitroglycerin     SubLingual 0.4 milliGRAM(s) SubLingual Once      Daily Height in cm: 170.18 (17 Dec 2022 06:41)    Daily     Drug Dosing Weight  Height (cm): 170.2 (17 Dec 2022 06:41)  Weight (kg): 77.6 (17 Dec 2022 06:41)  BMI (kg/m2): 26.8 (17 Dec 2022 06:41)  BSA (m2): 1.89 (17 Dec 2022 06:41)      PAST MEDICAL & SURGICAL HISTORY:  Afib  Congestive heart failure (CHF)  CVA (cerebral vascular accident)  Hypertension, unspecified type  No significant past surgical history        ADVANCE DIRECTIVES: Full code      REVIEW OF SYSTEMS: Pt poor historian, states he had +SOB and CP; otherwise ROS negative        Vital Signs Last 24 Hrs  T(C): 37.4 (17 Dec 2022 06:41), Max: 37.4 (17 Dec 2022 06:41)  T(F): 99.3 (17 Dec 2022 06:41), Max: 99.3 (17 Dec 2022 06:41)  HR: 66 (17 Dec 2022 11:10) (60 - 112)  BP: 168/77 (17 Dec 2022 11:10) (132/67 - 198/89)  BP(mean): 102 (17 Dec 2022 11:10) (87 - 142)  RR: 25 (17 Dec 2022 11:10) (21 - 29)  SpO2: 98% (17 Dec 2022 11:10) (89% - 100%)    O2 Parameters below as of 17 Dec 2022 11:10  Patient On (Oxygen Delivery Method): nasal cannula  O2 Flow (L/min): 5      ABG - ( 17 Dec 2022 10:35 )  pH, Arterial: 7.37  pH, Blood: x     /  pCO2: 45    /  pO2: 87    / HCO3: 26    / Base Excess: 0.7   /  SaO2: 97.0          PHYSICAL EXAM:  GENERAL: NAD, non-toxic appearing, alert, protecting airway; able to speak in full sentences off bipap  HEAD:  Atraumatic, Normocephalic  EYES: EOMI, PERRL  ENMT: poor dentition, dry mucosa  NECK: Supple, trachea midline  NERVOUS SYSTEM:  Alert & Oriented X3, Good concentration; RAMOS x4  CHEST/LUNG: CTA b/l, normal chest expansion noted  HEART: Irregular rate and rhythm; + murmur  ABDOMEN: Soft, Nontender, Nondistended; Bowel sounds present  EXTREMITIES:  2+ Peripheral Pulses, No clubbing, cyanosis, or edema  LYMPH: No lymphadenopathy noted  SKIN: No rashes or lesions        LABS:  CBC Full  -  ( 17 Dec 2022 07:00 )  WBC Count : 9.86 K/uL  RBC Count : 4.31 M/uL  Hemoglobin : 14.0 g/dL  Hematocrit : 43.2 %  Platelet Count - Automated : 167 K/uL  Mean Cell Volume : 100.2 fl  Mean Cell Hemoglobin : 32.5 pg  Mean Cell Hemoglobin Concentration : 32.4 gm/dL  Auto Neutrophil # : 6.14 K/uL  Auto Lymphocyte # : 2.04 K/uL  Auto Monocyte # : 1.46 K/uL  Auto Eosinophil # : 0.14 K/uL  Auto Basophil # : 0.03 K/uL  Auto Neutrophil % : 62.3 %  Auto Lymphocyte % : 20.7 %  Auto Monocyte % : 14.8 %  Auto Eosinophil % : 1.4 %  Auto Basophil % : 0.3 %    12-17    141  |  104  |  19  ----------------------------<  144<H>  3.5   |  23  |  1.43<H>    Ca    9.1      17 Dec 2022 07:00    TPro  8.2  /  Alb  3.7  /  TBili  0.7  /  DBili  x   /  AST  25  /  ALT  13  /  AlkPhos  68  12-17    Troponin I, High Sensitivity Result: 100.3 -> 93.6 -> 104.1      Serum Pro-Brain Natriuretic Peptide: 6085 pg/mL (12.17.22 @ 07:00)      Lactate, Blood: 1.1 mmol/L (09.30.19 @ 06:25)      ABG - ( 17 Dec 2022 10:35 )  pH, Arterial: 7.37  pH, Blood: x     /  pCO2: 45    /  pO2: 87    / HCO3: 26    / Base Excess: 0.7   /  SaO2: 97.0        12 Lead ECG (12.17.22 @ 06:52) 0  Ventricular Rate 109 BPM  Atrial Rate 108 BPM  QRS Duration 98 ms  Q-T Interval 354 ms  QTC Calculation(Bazett) 476 ms  R Axis 25 degrees  T Axis 126 degrees  Diagnosis Line Atrial fibrillation with rapid ventricular response with premature ventricular or aberrantly conducted complexes  ST and T abnormalities v4-6    When compared with ECG of 20-JUN-2021 13:55,  save for artifact no significant changes  Confirmed by KAMLESH STANFORD, BELKYS ([id number]) on 12/17/2022 9:42:35 AM        RADIOLOGY:  CXR from this AM  my read  right lower lobe infiltrate; ?left sided infiltrate behind cardiac silhouette        CRITICAL CARE TIME SPENT: 35 minutes   Mr. Antony is an 88 year old male with a PMH of CVA, CHF, HTN, afib on eliquis, hip fx s/p fall and prior intubation for HF exacerbation who presented to [facility] ED with c/o dyspnea and CP. Initially in ED pt was noted to have increased work of breathing and was hypoxic on room air with abdominal breathing and SBP into 200's. He was tx with NIV, Lasix and nitro at that time with + response. Pt daughter denied prior fever, however, pt now positive for influenza A. ICU team consulted.     Patient seen and examined taken off NIV and placed on n/c 5 L   states feeling better  no cp, sob, palp, n/v  had chest pain when coming in better now       Allergies  No Known Allergies      MEDICATIONS  (STANDING):  nitroglycerin     SubLingual 0.4 milliGRAM(s) SubLingual Once      Daily Height in cm: 170.18 (17 Dec 2022 06:41)    Daily     Drug Dosing Weight  Height (cm): 170.2 (17 Dec 2022 06:41)  Weight (kg): 77.6 (17 Dec 2022 06:41)  BMI (kg/m2): 26.8 (17 Dec 2022 06:41)  BSA (m2): 1.89 (17 Dec 2022 06:41)      PAST MEDICAL & SURGICAL HISTORY:  Afib  Congestive heart failure (CHF)  CVA (cerebral vascular accident)  Hypertension, unspecified type  No significant past surgical history        ADVANCE DIRECTIVES: Full code      REVIEW OF SYSTEMS: Pt poor historian, states he had +SOB and CP; otherwise ROS negative        Vital Signs Last 24 Hrs  T(C): 37.4 (17 Dec 2022 06:41), Max: 37.4 (17 Dec 2022 06:41)  T(F): 99.3 (17 Dec 2022 06:41), Max: 99.3 (17 Dec 2022 06:41)  HR: 66 (17 Dec 2022 11:10) (60 - 112)  BP: 168/77 (17 Dec 2022 11:10) (132/67 - 198/89)  BP(mean): 102 (17 Dec 2022 11:10) (87 - 142)  RR: 25 (17 Dec 2022 11:10) (21 - 29)  SpO2: 98% (17 Dec 2022 11:10) (89% - 100%)    O2 Parameters below as of 17 Dec 2022 11:10  Patient On (Oxygen Delivery Method): nasal cannula  O2 Flow (L/min): 5      ABG - ( 17 Dec 2022 10:35 )  pH, Arterial: 7.37  pH, Blood: x     /  pCO2: 45    /  pO2: 87    / HCO3: 26    / Base Excess: 0.7   /  SaO2: 97.0          PHYSICAL EXAM:  GENERAL: NAD, non-toxic appearing, alert, protecting airway; able to speak in full sentences off bipap  HEAD:  Atraumatic, Normocephalic  EYES: EOMI, PERRL  ENMT: poor dentition, dry mucosa  NECK: Supple, trachea midline  NERVOUS SYSTEM:  Alert & Oriented X2-3, Good concentration; RAMOS x4  CHEST/LUNG: CTA b/l, normal chest expansion noted  HEART: Irregular rate and rhythm; + murmur  ABDOMEN: Soft, Nontender, Nondistended; Bowel sounds present  EXTREMITIES:  2+ Peripheral Pulses, No clubbing, cyanosis, or edema  LYMPH: No lymphadenopathy noted  SKIN: No rashes or lesions        LABS:  CBC Full  -  ( 17 Dec 2022 07:00 )  WBC Count : 9.86 K/uL  RBC Count : 4.31 M/uL  Hemoglobin : 14.0 g/dL  Hematocrit : 43.2 %  Platelet Count - Automated : 167 K/uL  Mean Cell Volume : 100.2 fl  Mean Cell Hemoglobin : 32.5 pg  Mean Cell Hemoglobin Concentration : 32.4 gm/dL  Auto Neutrophil # : 6.14 K/uL  Auto Lymphocyte # : 2.04 K/uL  Auto Monocyte # : 1.46 K/uL  Auto Eosinophil # : 0.14 K/uL  Auto Basophil # : 0.03 K/uL  Auto Neutrophil % : 62.3 %  Auto Lymphocyte % : 20.7 %  Auto Monocyte % : 14.8 %  Auto Eosinophil % : 1.4 %  Auto Basophil % : 0.3 %    12-17    141  |  104  |  19  ----------------------------<  144<H>  3.5   |  23  |  1.43<H>    Ca    9.1      17 Dec 2022 07:00    TPro  8.2  /  Alb  3.7  /  TBili  0.7  /  DBili  x   /  AST  25  /  ALT  13  /  AlkPhos  68  12-17    Troponin I, High Sensitivity Result: 100.3 -> 93.6 -> 104.1      Serum Pro-Brain Natriuretic Peptide: 6085 pg/mL (12.17.22 @ 07:00)      Lactate, Blood: 1.1 mmol/L (09.30.19 @ 06:25)      ABG - ( 17 Dec 2022 10:35 )  pH, Arterial: 7.37  pH, Blood: x     /  pCO2: 45    /  pO2: 87    / HCO3: 26    / Base Excess: 0.7   /  SaO2: 97.0        12 Lead ECG (12.17.22 @ 06:52) 0  Ventricular Rate 109 BPM  Atrial Rate 108 BPM  QRS Duration 98 ms  Q-T Interval 354 ms  QTC Calculation(Bazett) 476 ms  R Axis 25 degrees  T Axis 126 degrees  Diagnosis Line Atrial fibrillation with rapid ventricular response with premature ventricular or aberrantly conducted complexes  ST and T abnormalities v4-6    When compared with ECG of 20-JUN-2021 13:55,  save for artifact no significant changes  Confirmed by BELKYS WHITLOCK MD ([id number]) on 12/17/2022 9:42:35 AM        RADIOLOGY:  CXR from this AM  my read  right lower lobe infiltrate; ?left sided infiltrate behind cardiac silhouette        CRITICAL CARE TIME SPENT: 35 minutes

## 2022-12-17 NOTE — ED ADULT NURSE NOTE - NSIMPLEMENTINTERV_GEN_ALL_ED
Implemented All Fall with Harm Risk Interventions:  [place] to call system. Call bell, personal items and telephone within reach. Instruct patient to call for assistance. Room bathroom lighting operational. Non-slip footwear when patient is off stretcher. Physically safe environment: no spills, clutter or unnecessary equipment. Stretcher in lowest position, wheels locked, appropriate side rails in place. Provide visual cue, wrist band, yellow gown, etc. Monitor gait and stability. Monitor for mental status changes and reorient to person, place, and time. Review medications for side effects contributing to fall risk. Reinforce activity limits and safety measures with patient and family. Provide visual clues: red socks.

## 2022-12-17 NOTE — ED PROVIDER NOTE - CLINICAL SUMMARY MEDICAL DECISION MAKING FREE TEXT BOX
Tyrell STANFORD: 88-year-old male history of A. fib on Eliquis CHF with prior intubations and admission for CHF exacerbation prior CVA hypertension presents with a chief complaint of cough shortness of breath and dyspnea consistent with prior CHF exacerbations.  Patient is a limited historian she presents in respiratory distress some discomfort daughter providing most history at bedside.  Per daughter no fever.  Exam tachycardic with A. fib on monitor hypoxic on room air increased work of breathing with belly breathing and noted to be hypertensive with SBP into 200s.  PE as above.  DDx concern for likely CHF exacerbation will check labs chest x-ray cardiac start BiPAP give Lasix and nitro drip as indicated pending blood pressure reassessment will discuss with ICU, admit.

## 2022-12-17 NOTE — CONSULT NOTE ADULT - ASSESSMENT
88 year old male with a PMH of CVA, CHF, HTN, afib on eliquis, hip fx s/p fall and prior intubation for HF exacerbation presenting to [facility] ED for SOB and CP. Pt noted to be hypoxemic with elevated sbp to 200's. Pt tx with NIV, lasix and nitro with +response. ICU team consulted. Influenza A + with mild elevated troponin, elevated bnp and ?IRINEO on labs. When we arrived pt was alert and oriented, with O2 sat 100%, rate controlled afib on monitor and sbp 140. Pt trial off bipap with NC ~4 liters without desaturation or respiratory distress. ABG obtained with the following results: 7.37 /  45 / 87 / 26 97% on 4 liters NC. Troponin mildly elevated and has stabilized with no major change and ekg is without acute ischemia noted currently appearing to be demand ischemia at this time (also he is on AC at home), cardiology contacted by ED MD. BNP elevated to 6k. Given pt appears comfortable and states that he is "feeling better" along with his neurological, respiratory (off NIV) and hemodynamic stability  he is not currently a candidate for ICU and is appropriate for monitoring on telemetry under medicine service. However, if any changes should occur please contact our team immediately. Thank you. Recommending the following:    --admit to telemetry, continuous pulse oximetry  --monitor EKG and labs, cardiology consult; continue lasix, +nitro prn and bb; obtain tte  --continue NC for supplemental O2, wean fio2 as tolerated; nebs prn, consider steroids  --tamiflu, consider CAP coverage  --monitor renal function, i/o's; avoid nephrotoxic agents  --cont AC + asa and home med regimen  --[facility] ongoing       88 year old male with a PMH of CVA, CHF, HTN, afib on eliquis, hip fx s/p fall and prior intubation for HF exacerbation presenting to [facility] ED for SOB and CP. Pt noted to be hypoxemic with elevated sbp to 200's. Pt tx with NIV, lasix and nitro with +response. ICU team consulted. Influenza A + with mild elevated troponin, elevated bnp and ?IRINEO on labs. When we arrived pt was alert and oriented, with O2 sat 100%, rate controlled afib on monitor and sbp 140. Pt trial off bipap with NC ~4 liters without desaturation or respiratory distress. ABG obtained with the following results: 7.37 /  45 / 87 / 26 97% on 4 liters NC. Troponin mildly elevated and has stabilized with no major change and ekg is without acute ischemia noted currently appearing to be demand ischemia at this time (also he is on AC at home), cardiology contacted by ED MD. BNP elevated to 6k. Given pt appears comfortable and states that he is "feeling better" along with his neurological, respiratory (off NIV) and hemodynamic stability  he is not currently a candidate for ICU and is appropriate for monitoring on telemetry under medicine service. However, if any changes should occur please contact our team immediately. Thank you. Recommending the following:    Assessment  1. Influenza A  2. Suspected Pulmonary Edema - with Respiratory distress improved after Diuretics     in a patient with combined systolic/diastolic CHF   3. Elevated trop, r/o NSTEMI  4. Underlying Afib on Eliquis, HTN, CVA,     Plan   --admit to telemetry, continuous pulse oximetry  --monitor EKG and labs, cardiology consult; continue lasix, +nitro prn and bb; obtain tte, trend cardiac enzymes  --continue NC for supplemental O2, wean fio2 as tolerated; nebs prn, consider steroids, can hold NIV  --tamiflu, consider CAP coverage  --monitor renal function, i/o's; avoid nephrotoxic agents  --cont AC + asa and home med regimen  --C ongoing       88 year old male with a PMH of CVA, CHF, HTN, afib on eliquis, hip fx s/p fall and prior intubation for HF exacerbation presenting to [facility] ED for SOB and CP. Pt noted to be hypoxemic with elevated sbp to 200's. Pt tx with NIV, lasix and nitro with +response. ICU team consulted. Influenza A + with mild elevated troponin, elevated bnp and ?IRINEO on labs. When we arrived pt was alert and oriented, with O2 sat 100%, rate controlled afib on monitor and sbp 140. Pt trial off bipap with NC ~4 liters without desaturation or respiratory distress. ABG obtained with the following results: 7.37 /  45 / 87 / 26 97% on 4 liters NC. Troponin mildly elevated and has stabilized with no major change and ekg is without acute ischemia noted currently appearing to be demand ischemia at this time (also he is on AC at home), cardiology contacted by ED MD. BNP elevated to 6k. Given pt appears comfortable and states that he is "feeling better" along with his neurological, respiratory (off NIV) and hemodynamic stability  he is not currently a candidate for ICU and is appropriate for monitoring on telemetry under medicine service. However, if any changes should occur please contact our team immediately. Thank you. Recommending the following:    Assessment  1. Influenza A  2. Suspected Pulmonary Edema - with Respiratory distress improved after Diuretics     in a patient with combined systolic/diastolic CHF   3. Elevated trop, r/o NSTEMI  4. Underlying Afib on Eliquis, HTN, CVA,     Plan   --admit to telemetry, continuous pulse oximetry  --monitor EKG and labs, cardiology consult; continue lasix, +nitro prn and bb; obtain tte, trend cardiac enzymes  --continue NC for supplemental O2, wean fio2 as tolerated; nebs prn, consider steroids, can hold NIV  --tamiflu, consider CAP coverage  --monitor renal function, i/o's; avoid nephrotoxic agents  --cont AC + asa and home med regimen  --[facility] ongoing      UPDATE: Pt bp elevated to 200 SBP with increased work of breathing likely 2/2 flash pulmonary edema sequale. Labetalol given x1 with +response for short period. Will bring to ICU for afterload management, and antihypertensive PO management and NIV. Discussed with attending. Given ICU location will use nitro gtt for ease of titration and to manage PO antihypertensive regimen.

## 2022-12-17 NOTE — PROGRESS NOTE ADULT - ASSESSMENT
PLAN:    -tonight will trial patient off BiPAP, if tolerates will start PO anti-hypetensives with goal of coming off nitro gtt.  -diurese as needed, monitor UOP, once <0.5 cc/hr would dose additional lasix  -follow lytes with diuresis last K+ 3.5, he was given KCL supplements  -symptomatic management of INFLUENZA A, currently not on tamiflu, if able to come off BiPAP would consider starting  -on eliquis for afib  -rate control with lopressor  -AM labs

## 2022-12-18 LAB
ALBUMIN SERPL ELPH-MCNC: 3.1 G/DL — LOW (ref 3.3–5)
ALP SERPL-CCNC: 60 U/L — SIGNIFICANT CHANGE UP (ref 40–120)
ALT FLD-CCNC: 11 U/L — SIGNIFICANT CHANGE UP (ref 10–45)
ANION GAP SERPL CALC-SCNC: 13 MMOL/L — SIGNIFICANT CHANGE UP (ref 5–17)
AST SERPL-CCNC: 24 U/L — SIGNIFICANT CHANGE UP (ref 10–40)
BILIRUB SERPL-MCNC: 0.5 MG/DL — SIGNIFICANT CHANGE UP (ref 0.2–1.2)
BUN SERPL-MCNC: 28 MG/DL — HIGH (ref 7–23)
CALCIUM SERPL-MCNC: 8.7 MG/DL — SIGNIFICANT CHANGE UP (ref 8.4–10.5)
CHLORIDE SERPL-SCNC: 103 MMOL/L — SIGNIFICANT CHANGE UP (ref 96–108)
CO2 SERPL-SCNC: 29 MMOL/L — SIGNIFICANT CHANGE UP (ref 22–31)
CREAT SERPL-MCNC: 1.4 MG/DL — HIGH (ref 0.5–1.3)
EGFR: 48 ML/MIN/1.73M2 — LOW
GLUCOSE SERPL-MCNC: 113 MG/DL — HIGH (ref 70–99)
HCT VFR BLD CALC: 40.7 % — SIGNIFICANT CHANGE UP (ref 39–50)
HGB BLD-MCNC: 13.1 G/DL — SIGNIFICANT CHANGE UP (ref 13–17)
MAGNESIUM SERPL-MCNC: 2.6 MG/DL — SIGNIFICANT CHANGE UP (ref 1.6–2.6)
MCHC RBC-ENTMCNC: 32.2 GM/DL — SIGNIFICANT CHANGE UP (ref 32–36)
MCHC RBC-ENTMCNC: 32.8 PG — SIGNIFICANT CHANGE UP (ref 27–34)
MCV RBC AUTO: 101.8 FL — HIGH (ref 80–100)
NRBC # BLD: 0 /100 WBCS — SIGNIFICANT CHANGE UP (ref 0–0)
NT-PROBNP SERPL-SCNC: 5627 PG/ML — HIGH (ref 0–300)
PHOSPHATE SERPL-MCNC: 4.7 MG/DL — HIGH (ref 2.5–4.5)
PLATELET # BLD AUTO: 155 K/UL — SIGNIFICANT CHANGE UP (ref 150–400)
POTASSIUM SERPL-MCNC: 4.1 MMOL/L — SIGNIFICANT CHANGE UP (ref 3.5–5.3)
POTASSIUM SERPL-SCNC: 4.1 MMOL/L — SIGNIFICANT CHANGE UP (ref 3.5–5.3)
PROT SERPL-MCNC: 7.3 G/DL — SIGNIFICANT CHANGE UP (ref 6–8.3)
RBC # BLD: 4 M/UL — LOW (ref 4.2–5.8)
RBC # FLD: 13.2 % — SIGNIFICANT CHANGE UP (ref 10.3–14.5)
SODIUM SERPL-SCNC: 145 MMOL/L — SIGNIFICANT CHANGE UP (ref 135–145)
TROPONIN I, HIGH SENSITIVITY RESULT: 113.9 NG/L — HIGH
WBC # BLD: 9.01 K/UL — SIGNIFICANT CHANGE UP (ref 3.8–10.5)
WBC # FLD AUTO: 9.01 K/UL — SIGNIFICANT CHANGE UP (ref 3.8–10.5)

## 2022-12-18 PROCEDURE — 93010 ELECTROCARDIOGRAM REPORT: CPT

## 2022-12-18 PROCEDURE — 71045 X-RAY EXAM CHEST 1 VIEW: CPT | Mod: 26

## 2022-12-18 PROCEDURE — 99232 SBSQ HOSP IP/OBS MODERATE 35: CPT

## 2022-12-18 RX ORDER — LOSARTAN POTASSIUM 100 MG/1
50 TABLET, FILM COATED ORAL DAILY
Refills: 0 | Status: DISCONTINUED | OUTPATIENT
Start: 2022-12-18 | End: 2022-12-19

## 2022-12-18 RX ORDER — METOPROLOL TARTRATE 50 MG
2.5 TABLET ORAL EVERY 6 HOURS
Refills: 0 | Status: DISCONTINUED | OUTPATIENT
Start: 2022-12-18 | End: 2022-12-18

## 2022-12-18 RX ORDER — KETOROLAC TROMETHAMINE 30 MG/ML
15 SYRINGE (ML) INJECTION ONCE
Refills: 0 | Status: DISCONTINUED | OUTPATIENT
Start: 2022-12-18 | End: 2022-12-18

## 2022-12-18 RX ORDER — HYDRALAZINE HCL 50 MG
10 TABLET ORAL ONCE
Refills: 0 | Status: COMPLETED | OUTPATIENT
Start: 2022-12-18 | End: 2022-12-18

## 2022-12-18 RX ORDER — METOPROLOL TARTRATE 50 MG
50 TABLET ORAL DAILY
Refills: 0 | Status: DISCONTINUED | OUTPATIENT
Start: 2022-12-18 | End: 2023-01-03

## 2022-12-18 RX ORDER — AMLODIPINE BESYLATE 2.5 MG/1
10 TABLET ORAL DAILY
Refills: 0 | Status: DISCONTINUED | OUTPATIENT
Start: 2022-12-18 | End: 2022-12-25

## 2022-12-18 RX ADMIN — LOSARTAN POTASSIUM 50 MILLIGRAM(S): 100 TABLET, FILM COATED ORAL at 08:46

## 2022-12-18 RX ADMIN — Medication 100 MILLIEQUIVALENT(S): at 00:00

## 2022-12-18 RX ADMIN — SENNA PLUS 2 TABLET(S): 8.6 TABLET ORAL at 22:00

## 2022-12-18 RX ADMIN — Medication 1.5 MICROGRAM(S)/MIN: at 22:02

## 2022-12-18 RX ADMIN — APIXABAN 5 MILLIGRAM(S): 2.5 TABLET, FILM COATED ORAL at 10:40

## 2022-12-18 RX ADMIN — Medication 10 MILLIGRAM(S): at 08:50

## 2022-12-18 RX ADMIN — Medication 40 MILLIGRAM(S): at 05:59

## 2022-12-18 RX ADMIN — AMLODIPINE BESYLATE 10 MILLIGRAM(S): 2.5 TABLET ORAL at 08:46

## 2022-12-18 RX ADMIN — Medication 81 MILLIGRAM(S): at 12:19

## 2022-12-18 RX ADMIN — Medication 1.5 MICROGRAM(S)/MIN: at 08:36

## 2022-12-18 RX ADMIN — Medication 75 MILLIGRAM(S): at 22:01

## 2022-12-18 RX ADMIN — CHLORHEXIDINE GLUCONATE 1 APPLICATION(S): 213 SOLUTION TOPICAL at 06:00

## 2022-12-18 RX ADMIN — APIXABAN 5 MILLIGRAM(S): 2.5 TABLET, FILM COATED ORAL at 22:01

## 2022-12-18 RX ADMIN — Medication 40 MILLIGRAM(S): at 13:39

## 2022-12-18 RX ADMIN — Medication 50 MILLIGRAM(S): at 08:24

## 2022-12-18 RX ADMIN — Medication 100 MILLIEQUIVALENT(S): at 00:28

## 2022-12-18 RX ADMIN — Medication 2.5 MILLIGRAM(S): at 07:30

## 2022-12-18 RX ADMIN — Medication 1200 MILLIGRAM(S): at 23:20

## 2022-12-18 NOTE — PROGRESS NOTE ADULT - ASSESSMENT
imp    chf with mildy decreased ef and mitral insufficiency  cxr improved slightly   troponins remain flat and cpk negative speaking against acs      suggest    continue as per icu

## 2022-12-18 NOTE — PROGRESS NOTE ADULT - SUBJECTIVE AND OBJECTIVE BOX
Follow up for af/chf  SUBJ:Patient now in icu doing poorly. on bipap and tridil  PMH  Afib    Congestive heart failure (CHF)    CVA (cerebral vascular accident)    Hypertension, unspecified type        MEDICATIONS  (STANDING):  amLODIPine   Tablet 10 milliGRAM(s) Oral daily  apixaban 5 milliGRAM(s) Oral two times a day  aspirin enteric coated 81 milliGRAM(s) Oral daily  chlorhexidine 2% Cloths 1 Application(s) Topical <User Schedule>  furosemide   Injectable 40 milliGRAM(s) IV Push two times a day  losartan 50 milliGRAM(s) Oral daily  metoprolol succinate ER 50 milliGRAM(s) Oral daily  nitroglycerin  Infusion 5 MICROgram(s)/Min (1.5 mL/Hr) IV Continuous <Continuous>  senna 2 Tablet(s) Oral at bedtime    MEDICATIONS  (PRN):        PHYSICAL EXAM:  Vital Signs Last 24 Hrs  T(C): 36.6 (17 Dec 2022 17:29), Max: 36.6 (17 Dec 2022 17:29)  T(F): 97.8 (17 Dec 2022 17:29), Max: 97.8 (17 Dec 2022 17:29)  HR: 89 (18 Dec 2022 10:20) (62 - 140)  BP: 162/80 (18 Dec 2022 10:20) (145/90 - 213/134)  BP(mean): 102 (18 Dec 2022 10:20) (96 - 194)  RR: 29 (18 Dec 2022 10:20) (19 - 33)  SpO2: 98% (18 Dec 2022 10:20) (87% - 100%)    Parameters below as of 18 Dec 2022 10:20  Patient On (Oxygen Delivery Method): BiPAP/CPAP    O2 Concentration (%): 50    GENERAL: NAD, well-groomed, well-developed  HEAD:  Atraumatic, Normocephalic  EYES: EOMI, PERRLA, conjunctiva and sclera clear  ENT: Moist mucous membranes,  NECK: Supple, No JVD, no bruits  CHEST/LUNG: scattered rhonchi  HEART: irregiularly irregular  ABDOMEN: Soft, Nontender, Nondistended; Bowel sounds present  EXTREMITIES:  2+ Peripheral Pulses, No clubbing, cyanosis, or edema  SKIN: No rashes or lesions  NERVOUS SYSTEM:  Alert & Oriented X3, Good concentration; Motor Strength 5/5 B/L upper and lower extremities; DTRs 2+ intact and symmetric      TELEMETRY:    ECG:    LABS:                        13.1   9.01  )-----------( 155      ( 18 Dec 2022 06:45 )             40.7     12-18    145  |  103  |  28<H>  ----------------------------<  113<H>  4.1   |  29  |  1.40<H>    Ca    8.7      18 Dec 2022 06:45  Phos  4.7     12-18  Mg     2.6     12-18    TPro  7.3  /  Alb  3.1<L>  /  TBili  0.5  /  DBili  x   /  AST  24  /  ALT  11  /  AlkPhos  60  12-18            I&O's Summary    17 Dec 2022 07:01  -  18 Dec 2022 07:00  --------------------------------------------------------  IN: 450.5 mL / OUT: 1900 mL / NET: -1449.5 mL    18 Dec 2022 07:01  -  18 Dec 2022 10:50  --------------------------------------------------------  IN: 60 mL / OUT: 500 mL / NET: -440 mL      BNPSerum Pro-Brain Natriuretic Peptide: 5627 pg/mL (12-18 @ 06:45)      RADIOLOGY & ADDITIONAL STUDIES:cxr-still with congestive pattern but seemingly slight improvement    :

## 2022-12-18 NOTE — PROGRESS NOTE ADULT - ASSESSMENT
Physical Examination:  GENERAL:               Alert,  mod acute distress.    HEENT:                    + JVD, Moist MM  PULM:                     Bilateral air entry, Clear to auscultation bilaterally, no significant sputum production, +Rales, No Rhonchi, + Wheezing  CVS:                         S1, S2,  +Murmur  ABD:                        Soft, nondistended, nontender, normoactive bowel sounds,   EXT:                         mild edema, nontender, No Cyanosis or Clubbing   NEURO:                  Alert, oriented, interactive, nonfocal, follows commands  PSYC:                      Calm, + Insight.        88 year old male with a PMH of CVA, CHF, HTN, afib on eliquis, hip fx s/p fall and prior intubation for HF exacerbation presenting to [facility] ED for SOB and CP. Pt noted to be hypoxemic with elevated sbp to 200's. Pt tx with NIV, lasix and nitro with +response. ICU team consulted. Influenza A + with mild elevated troponin, elevated bnp and ?IRINEO on labs. When we arrived pt was alert and oriented, with O2 sat 100%, rate controlled afib on monitor and sbp 140. Pt trial off bipap with NC ~4 liters without desaturation or respiratory distress. ABG obtained with the following results: 7.37 /  45 / 87 / 26 97% on 4 liters NC. Troponin mildly elevated and has stabilized with no major change and ekg is without acute ischemia noted currently appearing to be demand ischemia at this time (also he is on AC at home), cardiology contacted by ED MD. BNP elevated to 6k. Given pt appears comfortable and states that he is "feeling better" along with his neurological, respiratory (off NIV) and hemodynamic stability  he is not currently a candidate for ICU and is appropriate for monitoring on telemetry under medicine service. However, if any changes should occur please contact our team immediately. Thank you. Recommending the following:    Assessment  1. Influenza A  2. 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 Elevated trop, r/o NSTEMI  4. Underlying Afib on Eliquis, HTN, CVA,     Plan   Continue NTG Drip   Oral home BP meds to be restarted  NIV alternating with High flow  Oral diet  Lasix   F/u Echo  ASA, A/c with eliquis  palliative care f/u for [facility]  Cardio Follow up     PMD:				                   Notified(Date):  Family Updated: 	Ex Wife -Adolph- [phone]	                Date: 12/18/22      Sedation & Analgesia:	  Diet/Nutrition:		Diet, Minced and Moist:   Consistent Carbohydrate No Snacks  DASH/TLC Sodium & Cholesterol Restricted (12-18-22 @ 08:09) [Active]     GI PPx:			  senna 2 Tablet(s) Oral at bedtime     DVT Ppx:		  apixaban 5 milliGRAM(s) Oral two times a day  aspirin enteric coated 81 milliGRAM(s) Oral daily      Activity:		    Head of Bed:               35-45 Deg  Glycemic Control:              Lines: PIV    Restraints were deemed necessary to prevent removal of life-sustaining devices [  ] YES   [ x   ]  NO    Disposition: ICU     Goals of Care: Full code   Physical Examination:  GENERAL:               Alert,  mod acute distress.    HEENT:                    + JVD, Moist MM  PULM:                     Bilateral air entry, Clear to auscultation bilaterally, no significant sputum production, +Rales, No Rhonchi, + Wheezing  CVS:                         S1, S2,  +Murmur  ABD:                        Soft, nondistended, nontender, normoactive bowel sounds,   EXT:                         mild edema, nontender, No Cyanosis or Clubbing   NEURO:                  Alert, oriented, interactive, nonfocal, follows commands  PSYC:                      Calm, + Insight.        88 year old male with a PMH of CVA, CHF, HTN, afib on eliquis, hip fx s/p fall and prior intubation for HF exacerbation presenting to [facility] ED for SOB and CP. Pt noted to be hypoxemic with elevated sbp to 200's. Pt tx with NIV, lasix and nitro with +response. ICU team consulted. Influenza A + with mild elevated troponin, elevated bnp and ?IRINEO on labs. When we arrived pt was alert and oriented, with O2 sat 100%, rate controlled afib on monitor and sbp 140. Pt trial off bipap with NC ~4 liters without desaturation or respiratory distress. ABG obtained with the following results: 7.37 /  45 / 87 / 26 97% on 4 liters NC. Troponin mildly elevated and has stabilized with no major change and ekg is without acute ischemia noted currently appearing to be demand ischemia at this time (also he is on AC at home), cardiology contacted by ED MD. BNP elevated to 6k. Given pt appears comfortable and states that he is "feeling better" along with his neurological, respiratory (off NIV) and hemodynamic stability  he is not currently a candidate for ICU and is appropriate for monitoring on telemetry under medicine service. However, if any changes should occur please contact our team immediately. Thank you. Recommending the following:    Assessment  1. Influenza A  2. 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 Elevated trop, r/o NSTEMI  4. Underlying Afib on Eliquis, HTN, CVA,     Plan   Continue NTG Drip   Oral home BP meds to be restarted  NIV alternating with High flow  Oral diet  Lasix   F/u Echo  ASA, A/c with eliquis  palliative care f/u for [facility]  Cardio Follow up     PMD:				                   Notified(Date):  Family Updated: 	Ex Wife -Adolph- [phone]	                Date: 12/18/22  [facility]  - full code    Sedation & Analgesia:	  Diet/Nutrition:		Diet, Minced and Moist:   Consistent Carbohydrate No Snacks  DASH/TLC Sodium & Cholesterol Restricted (12-18-22 @ 08:09) [Active]     GI PPx:			  senna 2 Tablet(s) Oral at bedtime     DVT Ppx:		  apixaban 5 milliGRAM(s) Oral two times a day  aspirin enteric coated 81 milliGRAM(s) Oral daily      Activity:		    Head of Bed:               35-45 Deg  Glycemic Control:              Lines: PIV    Restraints were deemed necessary to prevent removal of life-sustaining devices [  ] YES   [ x   ]  NO    Disposition: ICU     Goals of Care: Full code

## 2022-12-18 NOTE — PROGRESS NOTE ADULT - SUBJECTIVE AND OBJECTIVE BOX
Patient is a 88y old  Male who presents with a chief complaint of shortness of breath (18 Dec 2022 12:01)    PAST MEDICAL & SURGICAL HISTORY:  Afib      Congestive heart failure (CHF)      CVA (cerebral vascular accident)      Hypertension, unspecified type      No significant past surgical history        RAD SINGLETON   88y    Male    BRIEF HOSPITAL COURSE:    Review of Systems:  +SOB  improved                   All other ROS are negative.    Allergies    No Known Allergies    Intolerances          ICU Vital Signs Last 24 Hrs  T(C): 36.7 (18 Dec 2022 18:00), Max: 36.8 (18 Dec 2022 16:00)  T(F): 98.1 (18 Dec 2022 18:00), Max: 98.2 (18 Dec 2022 16:00)  HR: 82 (18 Dec 2022 19:00) (62 - 120)  BP: 145/78 (18 Dec 2022 19:00) (120/70 - 205/133)  BP(mean): 99 (18 Dec 2022 19:00) (83 - 161)  ABP: --  ABP(mean): --  RR: 28 (18 Dec 2022 19:00) (19 - 36)  SpO2: 96% (18 Dec 2022 19:00) (87% - 100%)    O2 Parameters below as of 18 Dec 2022 17:45  Patient On (Oxygen Delivery Method): nasal cannula, high flow  O2 Flow (L/min): 40  O2 Concentration (%): 40      Physical Examination:    General:  mildly tachypneic    HEENT: + JVD    PULM: bilateral BS bibasilar rales     CVS: s1 s2 irreg    ABD: soft NT    EXT: trace edema     SKIN: warm    Neuro: Alret NF    ABG - ( 17 Dec 2022 10:35 )  pH, Arterial: 7.37  pH, Blood: x     /  pCO2: 45    /  pO2: 87    / HCO3: 26    / Base Excess: 0.7   /  SaO2: 97.0                  LABS:                        13.1   9.01  )-----------( 155      ( 18 Dec 2022 06:45 )             40.7     12-18    145  |  103  |  28<H>  ----------------------------<  113<H>  4.1   |  29  |  1.40<H>    Ca    8.7      18 Dec 2022 06:45  Phos  4.7     12-18  Mg     2.6     12-18    TPro  7.3  /  Alb  3.1<L>  /  TBili  0.5  /  DBili  x   /  AST  24  /  ALT  11  /  AlkPhos  60  12-18      CARDIAC MARKERS ( 17 Dec 2022 19:30 )  x     / x     / 86 U/L / x     / 1.9 ng/mL  CARDIAC MARKERS ( 17 Dec 2022 10:17 )  x     / x     / 74 U/L / x     / 1.7 ng/mL      CAPILLARY BLOOD GLUCOSE            CULTURES:      Medications:  MEDICATIONS  (STANDING):  amLODIPine   Tablet 10 milliGRAM(s) Oral daily  apixaban 5 milliGRAM(s) Oral two times a day  aspirin enteric coated 81 milliGRAM(s) Oral daily  chlorhexidine 2% Cloths 1 Application(s) Topical <User Schedule>  furosemide   Injectable 40 milliGRAM(s) IV Push two times a day  losartan 50 milliGRAM(s) Oral daily  metoprolol succinate ER 50 milliGRAM(s) Oral daily  nitroglycerin  Infusion 5 MICROgram(s)/Min (1.5 mL/Hr) IV Continuous <Continuous>  senna 2 Tablet(s) Oral at bedtime    MEDICATIONS  (PRN):        12-17 @ 07:01  -  12-18 @ 07:00  --------------------------------------------------------  IN: 450.5 mL / OUT: 1900 mL / NET: -1449.5 mL    12-18 @ 07:01  -  12-18 @ 20:21  --------------------------------------------------------  IN: 260 mL / OUT: 1525 mL / NET: -1265 mL        RADIOLOGY/IMAGING/ECHO    < from: TTE Echo Complete w/o Contrast w/ Doppler (12.17.22 @ 12:52) >    Summary:   1. Biatrial enlargement   2. Left ventricular ejection fraction, by visual estimation, is 45 to   50%.   3. Mildly decreased global left ventricular systolic function.   4. There is mild concentric left ventricular hypertrophy.   5. Moderate mitral valve regurgitation.   6. Moderate thickening and calcification of the anterior and posterior   mitral valve leaflets.   7. Mild-moderate tricuspid regurgitation.   8. Mild pulmonic valve regurgitation.   9. Apical septal segment, apex, and basal and mid inferior septum are   abnormal as described above.    < end of copied text >      Assessment/Plan:    88M hx a fib on apixaban HTN old  CVA HTN admit 12/17 with SOB hypoxemia ADHF in the setting of HTN (SCAPE)  Type 2 demand trop leak.      Required initiation of NIPPV and NTG  infusion     Improved with BP control and diuresis  Titrate off NTG  adjust oral antihypertensives.    Keep IV diuretic for now  HF NC   Does not need BIPAP.        Patient is a 88y old  Male who presents with a chief complaint of shortness of breath (18 Dec 2022 12:01)    PAST MEDICAL & SURGICAL HISTORY:  Afib      Congestive heart failure (CHF)      CVA (cerebral vascular accident)      Hypertension, unspecified type      No significant past surgical history        RAD SINGLETON   88y    Male    BRIEF HOSPITAL COURSE:    Review of Systems:  +SOB  improved                   All other ROS are negative.    Allergies    No Known Allergies    Intolerances          ICU Vital Signs Last 24 Hrs  T(C): 36.7 (18 Dec 2022 18:00), Max: 36.8 (18 Dec 2022 16:00)  T(F): 98.1 (18 Dec 2022 18:00), Max: 98.2 (18 Dec 2022 16:00)  HR: 82 (18 Dec 2022 19:00) (62 - 120)  BP: 145/78 (18 Dec 2022 19:00) (120/70 - 205/133)  BP(mean): 99 (18 Dec 2022 19:00) (83 - 161)  ABP: --  ABP(mean): --  RR: 28 (18 Dec 2022 19:00) (19 - 36)  SpO2: 96% (18 Dec 2022 19:00) (87% - 100%)    O2 Parameters below as of 18 Dec 2022 17:45  Patient On (Oxygen Delivery Method): nasal cannula, high flow  O2 Flow (L/min): 40  O2 Concentration (%): 40      Physical Examination:    General:  mildly tachypneic    HEENT: + JVD    PULM: bilateral BS bibasilar rales     CVS: s1 s2 irreg    ABD: soft NT    EXT: trace edema     SKIN: warm    Neuro: Alret NF    ABG - ( 17 Dec 2022 10:35 )  pH, Arterial: 7.37  pH, Blood: x     /  pCO2: 45    /  pO2: 87    / HCO3: 26    / Base Excess: 0.7   /  SaO2: 97.0                  LABS:                        13.1   9.01  )-----------( 155      ( 18 Dec 2022 06:45 )             40.7     12-18    145  |  103  |  28<H>  ----------------------------<  113<H>  4.1   |  29  |  1.40<H>    Ca    8.7      18 Dec 2022 06:45  Phos  4.7     12-18  Mg     2.6     12-18    TPro  7.3  /  Alb  3.1<L>  /  TBili  0.5  /  DBili  x   /  AST  24  /  ALT  11  /  AlkPhos  60  12-18      CARDIAC MARKERS ( 17 Dec 2022 19:30 )  x     / x     / 86 U/L / x     / 1.9 ng/mL  CARDIAC MARKERS ( 17 Dec 2022 10:17 )  x     / x     / 74 U/L / x     / 1.7 ng/mL      CAPILLARY BLOOD GLUCOSE            CULTURES:      Medications:  MEDICATIONS  (STANDING):  amLODIPine   Tablet 10 milliGRAM(s) Oral daily  apixaban 5 milliGRAM(s) Oral two times a day  aspirin enteric coated 81 milliGRAM(s) Oral daily  chlorhexidine 2% Cloths 1 Application(s) Topical <User Schedule>  furosemide   Injectable 40 milliGRAM(s) IV Push two times a day  losartan 50 milliGRAM(s) Oral daily  metoprolol succinate ER 50 milliGRAM(s) Oral daily  nitroglycerin  Infusion 5 MICROgram(s)/Min (1.5 mL/Hr) IV Continuous <Continuous>  senna 2 Tablet(s) Oral at bedtime    MEDICATIONS  (PRN):        12-17 @ 07:01  -  12-18 @ 07:00  --------------------------------------------------------  IN: 450.5 mL / OUT: 1900 mL / NET: -1449.5 mL    12-18 @ 07:01  -  12-18 @ 20:21  --------------------------------------------------------  IN: 260 mL / OUT: 1525 mL / NET: -1265 mL        RADIOLOGY/IMAGING/ECHO    < from: TTE Echo Complete w/o Contrast w/ Doppler (12.17.22 @ 12:52) >    Summary:   1. Biatrial enlargement   2. Left ventricular ejection fraction, by visual estimation, is 45 to   50%.   3. Mildly decreased global left ventricular systolic function.   4. There is mild concentric left ventricular hypertrophy.   5. Moderate mitral valve regurgitation.   6. Moderate thickening and calcification of the anterior and posterior   mitral valve leaflets.   7. Mild-moderate tricuspid regurgitation.   8. Mild pulmonic valve regurgitation.   9. Apical septal segment, apex, and basal and mid inferior septum are   abnormal as described above.    < end of copied text >      Assessment/Plan:    88M hx a fib on apixaban HTN old  CVA HTN admit 12/17 with SOB hypoxemia ADHF in the setting of HTN (SCAPE)  Type 2 demand trop leak.      Required initiation of NIPPV and NTG  infusion     Improved with BP control and diuresis  Titrate off NTG  adjust oral antihypertensives.    Keep IV diuretic for now  HF NC   Does not need BIPAP.    Start > Renal dose oseltamivir

## 2022-12-18 NOTE — DIETITIAN INITIAL EVALUATION ADULT - PERTINENT MEDS FT
MEDICATIONS  (STANDING):  apixaban 5 milliGRAM(s) Oral two times a day  aspirin enteric coated 81 milliGRAM(s) Oral daily  chlorhexidine 2% Cloths 1 Application(s) Topical <User Schedule>  furosemide   Injectable 40 milliGRAM(s) IV Push two times a day  metoprolol tartrate Injectable 2.5 milliGRAM(s) IV Push every 6 hours  nitroglycerin  Infusion 5 MICROgram(s)/Min (1.5 mL/Hr) IV Continuous <Continuous>  senna 2 Tablet(s) Oral at bedtime    MEDICATIONS  (PRN):

## 2022-12-18 NOTE — DIETITIAN INITIAL EVALUATION ADULT - ADD RECOMMEND
Continue with Therapeutic Low Sodium Diet education at f/u interview.   [place] pt's food preferences as feasible with prescribed diet.   Obtain and record PO intake and weights daily

## 2022-12-18 NOTE — PROGRESS NOTE ADULT - SUBJECTIVE AND OBJECTIVE BOX
Follow-up Critical Care Progress Note  Chief Complaint : Heart failure  patient remained on NIV overnight  and tapered to n/c and became sob now placed on high flow  + rales and sob continue  no cp, sob, palp, n/v  + hunger  overnight also noted to be hypertensive, was on ntg, started on home bp meds.       Allergies :No Known Allergies      PAST MEDICAL & SURGICAL HISTORY:  Afib    Congestive heart failure (CHF)    CVA (cerebral vascular accident)    Hypertension, unspecified type    No significant past surgical history        Medications:  MEDICATIONS  (STANDING):  amLODIPine   Tablet 10 milliGRAM(s) Oral daily  apixaban 5 milliGRAM(s) Oral two times a day  aspirin enteric coated 81 milliGRAM(s) Oral daily  chlorhexidine 2% Cloths 1 Application(s) Topical <User Schedule>  furosemide   Injectable 40 milliGRAM(s) IV Push two times a day  losartan 50 milliGRAM(s) Oral daily  metoprolol succinate ER 50 milliGRAM(s) Oral daily  nitroglycerin  Infusion 5 MICROgram(s)/Min (1.5 mL/Hr) IV Continuous <Continuous>  senna 2 Tablet(s) Oral at bedtime    MEDICATIONS  (PRN):      Antibiotics History      Heme Medications   apixaban 5 milliGRAM(s) Oral two times a day, 12-17-22 @ 11:33  aspirin enteric coated 81 milliGRAM(s) Oral daily, 12-17-22 @ 11:33      GI Medications  senna 2 Tablet(s) Oral at bedtime, 12-17-22 @ 11:34, Routine      COVID  12-17-22 @ 07:00  COVID -   NotDete  06-15-21 @ 13:45  COVID -   NotDetec  06-12-21 @ 20:51  COVID -   NotDete  02-17-21 @ 19:00  COVID -   Detected<!!>  08-25-20 @ 21:24  [facility]       Trend Cardiac Enzymes  12-18-22 @ 06:45  CRR-ZLHUN-MHPOE-CPKMM/Trop I - -- - --  - --  -  --  /  113.9<H>  12-17-22 @ 19:30  KZD-XYTRE-FXBQK-CPKMM/Trop I - 86 - --  - --  -  --  /  109.3<H>  12-17-22 @ 10:17  VRD-ARIOM-AWLNO-CPKMM/Trop I - 74 - --  - --  -  --  /  104.1<H>  12-17-22 @ 09:05  UFN-KYIBX-ZAWBS-CPKMM/Trop I - -- - --  - --  -  --  /  93.6<H>  12-17-22 @ 07:00  ODB-XJPNY-GBTEF-CPKMM/Trop I - -- - --  - --  -  --  /  100.3<H>    Trend BNP  12-18-22 @ 06:45   -  5627<H>  12-17-22 @ 07:00   -  6085<H>  06-20-21 @ 14:20   -  5333<H>    Procalcitonin Trend  12-17-22 @ 19:30   -   0.23<H>    WBC Trend  12-18-22 @ 06:45   -  9.01  12-17-22 @ 07:00   -  9.86    H/H Trend  12-18-22 @ 06:45   -   13.1/ 40.7  12-17-22 @ 07:00   -   14.0/ 43.2    Stool Occult Blood    Platelet Trend  12-18-22 @ 06:45   -  155  12-17-22 @ 07:00   -  167    Trend Sodium  12-18-22 @ 06:45   -  145  12-17-22 @ 19:30   -  144  12-17-22 @ 13:27   -  145  12-17-22 @ 07:00   -  141    Trend Potassium  12-18-22 @ 06:45   -  4.1  12-17-22 @ 19:30   -  3.5  12-17-22 @ 13:27   -  3.5  12-17-22 @ 07:00   -  3.5    Trend Bun/Cr  12-18-22 @ 06:45  BUN/CR -  28<H> / 1.40<H>  12-17-22 @ 19:30  BUN/CR -  22 / 1.32<H>  12-17-22 @ 13:27  BUN/CR -  24<H> / 1.44<H>  12-17-22 @ 07:00  BUN/CR -  19 / 1.43<H>         ABG Trend  12-17-22 @ 10:35   - 7.37/45/87/97.0  09-30-19 @ 04:38   - 7.41/35/305<H>/>99<H>  09-30-19 @ 01:28   - 7.18<LL>/57<H>/103/95    Trend AST/ALT/ALK Phos/Bili  12-18-22 @ 06:45   24/11/60/0.5  12-17-22 @ 07:00   25/13/68/0.7  06-20-21 @ 14:20   51<H>/34/69/1.9<H>  06-12-21 @ 19:30   18/13/53/0.3  08-25-20 @ 21:24   29/18/50/0.6       Albumin Trend  12-18-22 @ 06:45   -   3.1<L>  12-17-22 @ 07:00   -   3.7  06-20-21 @ 14:20   -   2.6<L>  06-13-21 @ 01:47   -   3.5  06-12-21 @ 19:30   -   4.0      PTT - PT - INR Trend  06-13-21 @ 05:18   -   31.1 - 13.7<H> - 1.15  06-12-21 @ 22:04   -   30.9 - 14.6<H> - 1.23<H>    Glucose Trend  12-18-22 @ 06:45   -  -- 113<H> --  12-17-22 @ 19:30   -  -- 104<H> --  12-17-22 @ 13:27   -  -- 102<H> --  12-17-22 @ 07:00   -  -- 144<H> --        LABS:                        13.1   9.01  )-----------( 155      ( 18 Dec 2022 06:45 )             40.7     12-18    145  |  103  |  28<H>  ----------------------------<  113<H>  4.1   |  29  |  1.40<H>    Ca    8.7      18 Dec 2022 06:45  Phos  4.7     12-18  Mg     2.6     12-18    TPro  7.3  /  Alb  3.1<L>  /  TBili  0.5  /  DBili  x   /  AST  24  /  ALT  11  /  AlkPhos  60  12-18      CARDIAC MARKERS ( 17 Dec 2022 19:30 )  x     / x     / 86 U/L / x     / 1.9 ng/mL  CARDIAC MARKERS ( 17 Dec 2022 10:17 )  x     / x     / 74 U/L / x     / 1.7 ng/mL     Procalcitonin, Serum: 0.23 ng/mL (12-17-22 @ 19:30)    Serum Pro-Brain Natriuretic Peptide: 5627 pg/mL (12-18-22 @ 06:45)  Serum Pro-Brain Natriuretic Peptide: 6085 pg/mL (12-17-22 @ 07:00)         RADIOLOGY  CXR:    worsening b/l haziness/pulmonary edema      VITALS:  T(C): 36.7 (12-18-22 @ 11:45), Max: 36.7 (12-18-22 @ 11:45)  T(F): 98 (12-18-22 @ 11:45), Max: 98 (12-18-22 @ 11:45)  HR: 91 (12-18-22 @ 11:45) (62 - 140)  BP: 140/66 (12-18-22 @ 11:45) (134/82 - 213/134)  BP(mean): 88 (12-18-22 @ 11:45) (88 - 194)  ABP: --  ABP(mean): --  RR: 29 (12-18-22 @ 11:45) (19 - 36)  SpO2: 99% (12-18-22 @ 11:45) (87% - 100%)  CVP(mm Hg): --  CVP(cm H2O): --    Ins and Outs     12-17-22 @ 07:01  -  12-18-22 @ 07:00  --------------------------------------------------------  IN: 450.5 mL / OUT: 1900 mL / NET: -1449.5 mL    12-18-22 @ 07:01  -  12-18-22 @ 12:01  --------------------------------------------------------  IN: 60 mL / OUT: 500 mL / NET: -440 mL        Height (cm): 170.2 (12-17-22 @ 06:41)  Weight (kg): 77.6 (12-17-22 @ 06:41)  BMI (kg/m2): 26.8 (12-17-22 @ 06:41)        I&O's Detail    17 Dec 2022 07:01  -  18 Dec 2022 07:00  --------------------------------------------------------  IN:    IV PiggyBack: 200 mL    Nitroglycerin: 250.5 mL  Total IN: 450.5 mL    OUT:    Voided (mL): 1900 mL  Total OUT: 1900 mL    Total NET: -1449.5 mL      18 Dec 2022 07:01  -  18 Dec 2022 12:01  --------------------------------------------------------  IN:    Nitroglycerin: 60 mL  Total IN: 60 mL    OUT:    Voided (mL): 500 mL  Total OUT: 500 mL    Total NET: -440 mL

## 2022-12-18 NOTE — DIETITIAN INITIAL EVALUATION ADULT - PERTINENT LABORATORY DATA
12-17    144  |  102  |  22  ----------------------------<  104<H>  3.5   |  28  |  1.32<H>    Ca    8.6      17 Dec 2022 19:30  Phos  4.6     12-17  Mg     1.9     12-17    TPro  8.2  /  Alb  3.7  /  TBili  0.7  /  DBili  x   /  AST  25  /  ALT  13  /  AlkPhos  68  12-17

## 2022-12-18 NOTE — DIETITIAN INITIAL EVALUATION ADULT - OTHER INFO
H&P: 89 y/o M PMH atrial fibrillation on eliquis, history of CVA, Hypertension presents for SOB suspected secondary to acute on chronic heart failure.   H&P: 89 y/o M PMH atrial fibrillation on eliquis, history of CVA, Hypertension presents for SOB suspected secondary to acute on chronic heart failure.    Pt reports fair appetite, noted in the morning had persistent cough. Pt feeds self, reports no chewing/swallowing difficulties, has partial dentition. Physician ordered minced and moist consistency to protect airways. NKFA. Denies N/V/C/D. Last BM: 12/17. UBW: 170 Lbs, current wt 171 Lbs. Edema on carlos leg +2. Skin WDL except bruised. Low Sodium diet education provided verbally, rx to avoid process foods, examples provided. Pt was agreeable w/ recommendations.

## 2022-12-19 LAB
ANION GAP SERPL CALC-SCNC: 11 MMOL/L — SIGNIFICANT CHANGE UP (ref 5–17)
BUN SERPL-MCNC: 40 MG/DL — HIGH (ref 7–23)
CALCIUM SERPL-MCNC: 8.3 MG/DL — LOW (ref 8.4–10.5)
CHLORIDE SERPL-SCNC: 102 MMOL/L — SIGNIFICANT CHANGE UP (ref 96–108)
CO2 SERPL-SCNC: 31 MMOL/L — SIGNIFICANT CHANGE UP (ref 22–31)
CREAT SERPL-MCNC: 1.4 MG/DL — HIGH (ref 0.5–1.3)
EGFR: 48 ML/MIN/1.73M2 — LOW
GLUCOSE SERPL-MCNC: 100 MG/DL — HIGH (ref 70–99)
HCT VFR BLD CALC: 38.4 % — LOW (ref 39–50)
HGB BLD-MCNC: 12.4 G/DL — LOW (ref 13–17)
MAGNESIUM SERPL-MCNC: 2.3 MG/DL — SIGNIFICANT CHANGE UP (ref 1.6–2.6)
MCHC RBC-ENTMCNC: 32.3 GM/DL — SIGNIFICANT CHANGE UP (ref 32–36)
MCHC RBC-ENTMCNC: 32.5 PG — SIGNIFICANT CHANGE UP (ref 27–34)
MCV RBC AUTO: 100.8 FL — HIGH (ref 80–100)
NRBC # BLD: 0 /100 WBCS — SIGNIFICANT CHANGE UP (ref 0–0)
OB PNL STL: POSITIVE
PHOSPHATE SERPL-MCNC: 3.3 MG/DL — SIGNIFICANT CHANGE UP (ref 2.5–4.5)
PLATELET # BLD AUTO: 145 K/UL — LOW (ref 150–400)
POTASSIUM SERPL-MCNC: 3.1 MMOL/L — LOW (ref 3.5–5.3)
POTASSIUM SERPL-SCNC: 3.1 MMOL/L — LOW (ref 3.5–5.3)
RBC # BLD: 3.81 M/UL — LOW (ref 4.2–5.8)
RBC # FLD: 13.4 % — SIGNIFICANT CHANGE UP (ref 10.3–14.5)
SODIUM SERPL-SCNC: 144 MMOL/L — SIGNIFICANT CHANGE UP (ref 135–145)
TROPONIN I, HIGH SENSITIVITY RESULT: 105.5 NG/L — HIGH
WBC # BLD: 9.54 K/UL — SIGNIFICANT CHANGE UP (ref 3.8–10.5)
WBC # FLD AUTO: 9.54 K/UL — SIGNIFICANT CHANGE UP (ref 3.8–10.5)

## 2022-12-19 PROCEDURE — 99232 SBSQ HOSP IP/OBS MODERATE 35: CPT

## 2022-12-19 PROCEDURE — 99223 1ST HOSP IP/OBS HIGH 75: CPT

## 2022-12-19 PROCEDURE — 71045 X-RAY EXAM CHEST 1 VIEW: CPT | Mod: 26

## 2022-12-19 RX ORDER — LOSARTAN POTASSIUM 100 MG/1
100 TABLET, FILM COATED ORAL DAILY
Refills: 0 | Status: DISCONTINUED | OUTPATIENT
Start: 2022-12-20 | End: 2022-12-22

## 2022-12-19 RX ORDER — HYDRALAZINE HCL 50 MG
10 TABLET ORAL
Refills: 0 | Status: DISCONTINUED | OUTPATIENT
Start: 2022-12-19 | End: 2022-12-25

## 2022-12-19 RX ORDER — PANTOPRAZOLE SODIUM 20 MG/1
40 TABLET, DELAYED RELEASE ORAL
Refills: 0 | Status: DISCONTINUED | OUTPATIENT
Start: 2022-12-19 | End: 2022-12-23

## 2022-12-19 RX ORDER — HYDRALAZINE HCL 50 MG
25 TABLET ORAL EVERY 8 HOURS
Refills: 0 | Status: DISCONTINUED | OUTPATIENT
Start: 2022-12-19 | End: 2022-12-24

## 2022-12-19 RX ORDER — ASPIRIN/CALCIUM CARB/MAGNESIUM 324 MG
1 TABLET ORAL
Qty: 0 | Refills: 0 | DISCHARGE

## 2022-12-19 RX ORDER — POTASSIUM CHLORIDE 20 MEQ
40 PACKET (EA) ORAL EVERY 4 HOURS
Refills: 0 | Status: COMPLETED | OUTPATIENT
Start: 2022-12-19 | End: 2022-12-19

## 2022-12-19 RX ORDER — IPRATROPIUM/ALBUTEROL SULFATE 18-103MCG
3 AEROSOL WITH ADAPTER (GRAM) INHALATION ONCE
Refills: 0 | Status: COMPLETED | OUTPATIENT
Start: 2022-12-19 | End: 2022-12-19

## 2022-12-19 RX ADMIN — LOSARTAN POTASSIUM 50 MILLIGRAM(S): 100 TABLET, FILM COATED ORAL at 05:57

## 2022-12-19 RX ADMIN — AMLODIPINE BESYLATE 10 MILLIGRAM(S): 2.5 TABLET ORAL at 05:57

## 2022-12-19 RX ADMIN — Medication 40 MILLIGRAM(S): at 10:19

## 2022-12-19 RX ADMIN — Medication 25 MILLIGRAM(S): at 15:25

## 2022-12-19 RX ADMIN — Medication 25 MILLIGRAM(S): at 21:26

## 2022-12-19 RX ADMIN — CHLORHEXIDINE GLUCONATE 1 APPLICATION(S): 213 SOLUTION TOPICAL at 05:58

## 2022-12-19 RX ADMIN — Medication 1200 MILLIGRAM(S): at 17:18

## 2022-12-19 RX ADMIN — Medication 81 MILLIGRAM(S): at 11:27

## 2022-12-19 RX ADMIN — Medication 40 MILLIGRAM(S): at 05:56

## 2022-12-19 RX ADMIN — Medication 30 MILLIGRAM(S): at 21:25

## 2022-12-19 RX ADMIN — Medication 40 MILLIEQUIVALENT(S): at 15:26

## 2022-12-19 RX ADMIN — Medication 30 MILLIGRAM(S): at 10:18

## 2022-12-19 RX ADMIN — Medication 40 MILLIEQUIVALENT(S): at 10:18

## 2022-12-19 RX ADMIN — Medication 3 MILLILITER(S): at 08:39

## 2022-12-19 RX ADMIN — APIXABAN 5 MILLIGRAM(S): 2.5 TABLET, FILM COATED ORAL at 21:25

## 2022-12-19 RX ADMIN — Medication 50 MILLIGRAM(S): at 05:57

## 2022-12-19 RX ADMIN — APIXABAN 5 MILLIGRAM(S): 2.5 TABLET, FILM COATED ORAL at 10:19

## 2022-12-19 RX ADMIN — Medication 1200 MILLIGRAM(S): at 05:57

## 2022-12-19 RX ADMIN — Medication 40 MILLIGRAM(S): at 15:45

## 2022-12-19 NOTE — PROGRESS NOTE ADULT - ASSESSMENT
87 y/o male with pmhx of HTN, CVA now with:    1. acute hypoxic respiratory failure  2. essential HTN  3. Flu A pna    - eliquis for afib  - tamiflu, renally dosed for 5 day course  - hydralazine, norvasc, losartan, lopressor. bp improved  - prednisone taper  - continue diuresis, cardio following  - guiac positive, no signs of active bleeding hgb 13.1-->12.4. repeat h/h if still downtrending will hold eliquis. start on protonix 40 mg BID    87 y/o male with pmhx of HTN, CVA now with:    1. acute hypoxic respiratory failure  2. essential HTN  3. Flu A pna    - eliquis for afib  - tamiflu, renally dosed for 5 day course  - hydralazine, norvasc, losartan, lopressor. bp improved  - prednisone taper  - continue diuresis, cardio following  - guiac positive, no signs of active bleeding hgb 13.1-->12.4. repeat h/h if still downtrending will hold eliquis. start on protonix 40 mg BID  - HFNC on 50%, titrate down as tolerated

## 2022-12-19 NOTE — PROGRESS NOTE ADULT - SUBJECTIVE AND OBJECTIVE BOX
RAD SINGLETON  [id number]      Chief Complaint: Influenza A/Hypertensive emergency/CHF    Interval History: The patient is on 50% high flow nasal cannula, still has chest congestion.     Tele: atrial fibrillation 80s BPM, NSVT      Current meds:   amLODIPine   Tablet 10 milliGRAM(s) Oral daily  apixaban 5 milliGRAM(s) Oral two times a day  aspirin enteric coated 81 milliGRAM(s) Oral daily  chlorhexidine 2% Cloths 1 Application(s) Topical <User Schedule>  furosemide   Injectable 40 milliGRAM(s) IV Push two times a day  guaiFENesin ER 1200 milliGRAM(s) Oral every 12 hours  hydrALAZINE Injectable 10 milliGRAM(s) IV Push every 2 hours PRN  losartan 100 milliGRAM(s) Oral daily  metoprolol succinate ER 50 milliGRAM(s) Oral daily  nitroglycerin  Infusion 5 MICROgram(s)/Min IV Continuous <Continuous>  oseltamivir 30 milliGRAM(s) Oral every 12 hours  potassium chloride    Tablet ER 40 milliEquivalent(s) Oral every 4 hours  predniSONE   Tablet 40 milliGRAM(s) Oral daily  senna 2 Tablet(s) Oral at bedtime      Objective:     Vital Signs:   T(C): 37 (12-19-22 @ 05:00), Max: 37 (12-19-22 @ 05:00)  HR: 75 (12-19-22 @ 07:00) (58 - 118)  BP: 163/80 (12-19-22 @ 07:00) (108/65 - 207/98)  RR: 26 (12-19-22 @ 07:00) (19 - 36)  SpO2: 99% (12-19-22 @ 07:00) (87% - 100%)  Wt(kg): --    Physical Exam:   General: elderly man on high flow nasal cannula   Neck: supple   CVS: JVP difficult to assess 2/2 high flow nasal cannula, irregularly irregular, s1, s2  Pulm: labored respirations, coarse breath sounds  Ext: no lower extremity edema b/l  Neuro: A&O x3  Psych: Normal affect      Labs:   19 Dec 2022 05:16    144    |  102    |  40     ----------------------------<  100    3.1     |  31     |  1.40     Ca    8.3        19 Dec 2022 05:16  Phos  3.3       19 Dec 2022 05:16  Mg     2.3       19 Dec 2022 05:16    TPro  7.3    /  Alb  3.1    /  TBili  0.5    /  DBili  x      /  AST  24     /  ALT  11     /  AlkPhos  60     18 Dec 2022 06:45                          12.4   9.54  )-----------( 145      ( 19 Dec 2022 05:16 )             38.4       CARDIAC MARKERS ( 17 Dec 2022 19:30 )  x     / x     / 86 U/L / x     / 1.9 ng/mL  CARDIAC MARKERS ( 17 Dec 2022 10:17 )  x     / x     / 74 U/L / x     / 1.7 ng/mL          ECG (1217/22): atrial fibrillation with RVR    TTE (12/17/22):   1. Biatrial enlargement   2. Left ventricular ejection fraction, by visual estimation, is 45 to 50%.   3. Mildly decreased global left ventricular systolic function.   4. There is mild concentric left ventricular hypertrophy.   5. Moderate mitral valve regurgitation.   6. Moderate thickening and calcification of the anterior and posterior mitral valve leaflets.   7. Mild-moderate tricuspid regurgitation.   8. Mild pulmonic valve regurgitation.   9. Apical septal segment, apex, and basal and mid inferior septum are abnormal as described above.

## 2022-12-19 NOTE — PROGRESS NOTE ADULT - ASSESSMENT
Physical Examination:  GENERAL:               Alert,  mod acute distress.    HEENT:                    + JVD, Moist MM  PULM:                     Bilateral air entry, Clear to auscultation bilaterally, no significant sputum production, +Rales, No Rhonchi, + Wheezing  CVS:                         S1, S2,  +Murmur  ABD:                        Soft, nondistended, nontender, normoactive bowel sounds,   EXT:                         mild edema, nontender, No Cyanosis or Clubbing   NEURO:                  Alert, oriented, interactive, nonfocal, follows commands  PSYC:                      Calm, + Insight.        88 year old male with a PMH of CVA, CHF, HTN, afib on eliquis, hip fx s/p fall and prior intubation for HF exacerbation presenting to [facility] ED for SOB and CP. Pt noted to be hypoxemic with elevated sbp to 200's. Pt tx with NIV, lasix and nitro with +response. ICU team consulted. Influenza A + with mild elevated troponin, elevated bnp and ?IRINEO on labs. When we arrived pt was alert and oriented, with O2 sat 100%, rate controlled afib on monitor and sbp 140. Pt trial off bipap with NC ~4 liters without desaturation or respiratory distress. ABG obtained with the following results: 7.37 /  45 / 87 / 26 97% on 4 liters NC. Troponin mildly elevated and has stabilized with no major change and ekg is without acute ischemia noted currently appearing to be demand ischemia at this time (also he is on AC at home), cardiology contacted by ED MD. BNP elevated to 6k. Given pt appears comfortable and states that he is "feeling better" along with his neurological, respiratory (off NIV) and hemodynamic stability  he is not currently a candidate for ICU and is appropriate for monitoring on telemetry under medicine service. However, if any changes should occur please contact our team immediately. Thank you. Recommending the following:    Assessment  1. Influenza A  2. 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 Elevated trop, r/o NSTEMI  4. Underlying Afib on Eliquis, HTN, CVA,     Plan   Off NTG Drip   Continue home BP meds  NIV alternating with High flow    taper Fio2 as tolerated  Oral diet  Lasix BID  prednisone started for wheezing today   ASA, A/c with eliquis  palliative care f/u for C  Cardio Follow up     PMD:				                   Notified(Date):  Family Updated: 	DTR Lora 	                Date: 12/1922   - updated on status     Sedation & Analgesia:	  Diet/Nutrition:		Diet, Minced and Moist:   Consistent Carbohydrate No Snacks  DASH/TLC Sodium & Cholesterol Restricted (12-18-22 @ 08:09) [Active]     GI PPx:			  senna 2 Tablet(s) Oral at bedtime     DVT Ppx:		  apixaban 5 milliGRAM(s) Oral two times a day  aspirin enteric coated 81 milliGRAM(s) Oral daily      Activity:		    Head of Bed:               35-45 Deg  Glycemic Control:              Lines: PIV    Restraints were deemed necessary to prevent removal of life-sustaining devices [  ] YES   [ x   ]  NO    Disposition: ICU     Goals of Care: Full code

## 2022-12-19 NOTE — PROGRESS NOTE ADULT - SUBJECTIVE AND OBJECTIVE BOX
Patient is a 88y old  Male who presents with a chief complaint of shortness of breath (19 Dec 2022 11:17)      BRIEF HOSPITAL COURSE: 89 y/o male with pmhx of HTN, CVA who presented on 12/17 with SOB and hypoxemia in setting of HTN urgency/acute pulmonary edema with elevated troponin due to type II demand ischemia. required nitroglycerin gtt but has since been weaned off. diursed well. required NIV initially now in HFNC 50%. Flu A+    Events last 24 hours: remains on HFNC    PAST MEDICAL & SURGICAL HISTORY:  Afib      Congestive heart failure (CHF)      CVA (cerebral vascular accident)      Hypertension, unspecified type      No significant past surgical history          Review of Systems:  CONSTITUTIONAL: No fever, chills, or fatigue  EYES: No eye pain, visual disturbances, or discharge  ENMT:  No difficulty hearing, tinnitus, vertigo; No sinus or throat pain  NECK: No pain or stiffness  RESPIRATORY: No cough, wheezing, chills or hemoptysis; No shortness of breath  CARDIOVASCULAR: No chest pain, palpitations, dizziness, or leg swelling  GASTROINTESTINAL: No abdominal or epigastric pain. No nausea, vomiting, or hematemesis; No diarrhea or constipation. No melena or hematochezia.  GENITOURINARY: No dysuria, frequency, hematuria, or incontinence  NEUROLOGICAL: No headaches, memory loss, loss of strength, numbness, or tremors  SKIN: No itching, burning, rashes, or lesions   MUSCULOSKELETAL: No joint pain or swelling; No muscle, back, or extremity pain  PSYCHIATRIC: No depression, anxiety, mood swings, or difficulty sleeping      Medications:  oseltamivir 30 milliGRAM(s) Oral every 12 hours    amLODIPine   Tablet 10 milliGRAM(s) Oral daily  furosemide   Injectable 40 milliGRAM(s) IV Push two times a day  hydrALAZINE 25 milliGRAM(s) Oral every 8 hours  hydrALAZINE Injectable 10 milliGRAM(s) IV Push every 2 hours PRN  losartan 100 milliGRAM(s) Oral daily  metoprolol succinate ER 50 milliGRAM(s) Oral daily    guaiFENesin ER 1200 milliGRAM(s) Oral every 12 hours        apixaban 5 milliGRAM(s) Oral two times a day  aspirin enteric coated 81 milliGRAM(s) Oral daily    senna 2 Tablet(s) Oral at bedtime      predniSONE   Tablet 40 milliGRAM(s) Oral daily        chlorhexidine 2% Cloths 1 Application(s) Topical <User Schedule>            ICU Vital Signs Last 24 Hrs  T(C): 36.7 (19 Dec 2022 20:00), Max: 37 (19 Dec 2022 05:00)  T(F): 98.1 (19 Dec 2022 20:00), Max: 98.6 (19 Dec 2022 05:00)  HR: 80 (19 Dec 2022 20:00) (58 - 95)  BP: 155/86 (19 Dec 2022 20:00) (108/65 - 207/98)  BP(mean): 101 (19 Dec 2022 20:00) (76 - 153)  ABP: --  ABP(mean): --  RR: 23 (19 Dec 2022 20:00) (19 - 33)  SpO2: 97% (19 Dec 2022 20:00) (92% - 100%)    O2 Parameters below as of 19 Dec 2022 21:00  Patient On (Oxygen Delivery Method): nasal cannula, high flow  O2 Flow (L/min): 50  O2 Concentration (%): 50            I&O's Detail    18 Dec 2022 07:01  -  19 Dec 2022 07:00  --------------------------------------------------------  IN:    Nitroglycerin: 100.5 mL    Oral Fluid: 520 mL  Total IN: 620.5 mL    OUT:    Voided (mL): 2325 mL  Total OUT: 2325 mL    Total NET: -1704.5 mL      19 Dec 2022 07:01  -  19 Dec 2022 20:58  --------------------------------------------------------  IN:    Oral Fluid: 250 mL  Total IN: 250 mL    OUT:    Voided (mL): 700 mL  Total OUT: 700 mL    Total NET: -450 mL            LABS:                        12.4   9.54  )-----------( 145      ( 19 Dec 2022 05:16 )             38.4     12-19    144  |  102  |  40<H>  ----------------------------<  100<H>  3.1<L>   |  31  |  1.40<H>    Ca    8.3<L>      19 Dec 2022 05:16  Phos  3.3     12-19  Mg     2.3     12-19    TPro  7.3  /  Alb  3.1<L>  /  TBili  0.5  /  DBili  x   /  AST  24  /  ALT  11  /  AlkPhos  60  12-18          CAPILLARY BLOOD GLUCOSE            CULTURES:      Physical Examination:    General: No acute distress.      HEENT: Pupils equal, reactive to light.  Symmetric.    PULM: Clear to auscultation bilaterally, no significant sputum production    NECK: Supple, no lymphadenopathy, trachea midline    CVS: Regular rate and rhythm, no murmurs, rubs, or gallops    ABD: Soft, nondistended, nontender, normoactive bowel sounds, no masses    EXT: No edema, nontender    SKIN: Warm and well perfused, no rashes noted.    NEURO: Alert, oriented, interactive, nonfocal    DEVICES:     RADIOLOGY:     IMPRESSION:  Congestive changes as noted.        Thank you for the courtesy of this referral.    --- End of Report ---            SHAMA VANEGAS MD; Attending Interventional Radiologist  This document has been electronically signed. Dec 19 2022 11:18AM

## 2022-12-19 NOTE — CONSULT NOTE ADULT - CONVERSATION DETAILS
Pt has chf.  Old chart reviewed.  No recent hospitaliztions. Pt has had multiple goc and has always wanted full cpr.  In the past his daughter has also been involved with these conversations.  She has been called but I was not able to leave message.  Currently he still wants no imitations  on his care.

## 2022-12-19 NOTE — PROGRESS NOTE ADULT - SUBJECTIVE AND OBJECTIVE BOX
Follow-up Critical Care Progress Note  Chief Complaint : Heart failure        patient feeling better with less sob  but required highflow and NIV  cough present  no cp, palp, n/v        Allergies :No Known Allergies      PAST MEDICAL & SURGICAL HISTORY:  Afib    Congestive heart failure (CHF)    CVA (cerebral vascular accident)    Hypertension, unspecified type    No significant past surgical history        Medications:  MEDICATIONS  (STANDING):  amLODIPine   Tablet 10 milliGRAM(s) Oral daily  apixaban 5 milliGRAM(s) Oral two times a day  aspirin enteric coated 81 milliGRAM(s) Oral daily  chlorhexidine 2% Cloths 1 Application(s) Topical <User Schedule>  furosemide   Injectable 40 milliGRAM(s) IV Push two times a day  guaiFENesin ER 1200 milliGRAM(s) Oral every 12 hours  losartan 100 milliGRAM(s) Oral daily  metoprolol succinate ER 50 milliGRAM(s) Oral daily  nitroglycerin  Infusion 5 MICROgram(s)/Min (1.5 mL/Hr) IV Continuous <Continuous>  oseltamivir 30 milliGRAM(s) Oral every 12 hours  potassium chloride    Tablet ER 40 milliEquivalent(s) Oral every 4 hours  predniSONE   Tablet 40 milliGRAM(s) Oral daily  senna 2 Tablet(s) Oral at bedtime    MEDICATIONS  (PRN):  hydrALAZINE Injectable 10 milliGRAM(s) IV Push every 2 hours PRN sbp >17mm/hg      Antibiotics History  oseltamivir 75 milliGRAM(s) Oral once, 12-18-22 @ 20:55, Stop order after: 1 Doses  oseltamivir 30 milliGRAM(s) Oral every 12 hours, 12-19-22 @ 09:00, Stop order after: 4 Days      Heme Medications   apixaban 5 milliGRAM(s) Oral two times a day, 12-17-22 @ 11:33  aspirin enteric coated 81 milliGRAM(s) Oral daily, 12-17-22 @ 11:33      GI Medications  senna 2 Tablet(s) Oral at bedtime, 12-17-22 @ 11:34, Routine      COVID  12-17-22 @ 07:00  COVID -   NotDetec  06-15-21 @ 13:45  COVID -   NotDetec  06-12-21 @ 20:51  COVID -   NotDetec  02-17-21 @ 19:00  COVID -   Detected<!!>  08-25-20 @ 21:24  COVID -   [facility]       Trend Cardiac Enzymes  12-19-22 @ 05:16  QJA-RTALI-VKQQH-CPKMM/Trop I - -- - --  - --  -  --  /  105.5<H>  12-18-22 @ 06:45  YAJ-XFDTA-FAGVY-CPKMM/Trop I - -- - --  - --  -  --  /  113.9<H>  12-17-22 @ 19:30  KHL-GGAFR-JUYDM-CPKMM/Trop I - 86 - --  - --  -  --  /  109.3<H>  12-17-22 @ 10:17  DFP-UJXSX-VALLH-CPKMM/Trop I - 74 - --  - --  -  --  /  104.1<H>  12-17-22 @ 09:05  AGC-IDWYP-HPBQB-CPKMM/Trop I - -- - --  - --  -  --  /  93.6<H>  12-17-22 @ 07:00  QVD-ZOMLH-SDOUW-CPKMM/Trop I - -- - --  - --  -  --  /  100.3<H>    Trend BNP  12-18-22 @ 06:45   -  5627<H>  12-17-22 @ 07:00   -  6085<H>  06-20-21 @ 14:20   -  5333<H>    Procalcitonin Trend  12-17-22 @ 19:30   -   0.23<H>    WBC Trend  12-19-22 @ 05:16   -  9.54  12-18-22 @ 06:45   -  9.01  12-17-22 @ 07:00   -  9.86    H/H Trend  12-19-22 @ 05:16   -   12.4<L>/ 38.4<L>  12-18-22 @ 06:45   -   13.1/ 40.7  12-17-22 @ 07:00   -   14.0/ 43.2       Platelet Trend  12-19-22 @ 05:16   -  145<L>  12-18-22 @ 06:45   -  155  12-17-22 @ 07:00   -  167    Trend Sodium  12-19-22 @ 05:16   -  144  12-18-22 @ 06:45   -  145  12-17-22 @ 19:30   -  144  12-17-22 @ 13:27   -  145  12-17-22 @ 07:00   -  141    Trend Potassium  12-19-22 @ 05:16   -  3.1<L>  12-18-22 @ 06:45   -  4.1  12-17-22 @ 19:30   -  3.5  12-17-22 @ 13:27   -  3.5  12-17-22 @ 07:00   -  3.5    Trend Bun/Cr  12-19-22 @ 05:16  BUN/CR -  40<H> / 1.40<H>  12-18-22 @ 06:45  BUN/CR -  28<H> / 1.40<H>  12-17-22 @ 19:30  BUN/CR -  22 / 1.32<H>  12-17-22 @ 13:27  BUN/CR -  24<H> / 1.44<H>  12-17-22 @ 07:00  BUN/CR -  19 / 1.43<H>    Lactic Acid Trend    ABG Trend  12-17-22 @ 10:35   - 7.37/45/87/97.0  09-30-19 @ 04:38   - 7.41/35/305<H>/>99<H>  09-30-19 @ 01:28   - 7.18<LL>/57<H>/103/95    Trend AST/ALT/ALK Phos/Bili  12-18-22 @ 06:45   24/11/60/0.5  12-17-22 @ 07:00   25/13/68/0.7  06-20-21 @ 14:20   51<H>/34/69/1.9<H>  06-12-21 @ 19:30   18/13/53/0.3  08-25-20 @ 21:24   29/18/50/0.6         Albumin Trend  12-18-22 @ 06:45   -   3.1<L>  12-17-22 @ 07:00   -   3.7  06-20-21 @ 14:20   -   2.6<L>  06-13-21 @ 01:47   -   3.5  06-12-21 @ 19:30   -   4.0      PTT - PT - INR Trend  06-13-21 @ 05:18   -   31.1 - 13.7<H> - 1.15  06-12-21 @ 22:04   -   30.9 - 14.6<H> - 1.23<H>    Glucose Trend  12-19-22 @ 05:16   -  -- 100<H> --  12-18-22 @ 06:45   -  -- 113<H> --  12-17-22 @ 19:30   -  -- 104<H> --  12-17-22 @ 13:27   -  -- 102<H> --  12-17-22 @ 07:00   -  -- 144<H> --        LABS:                        12.4   9.54  )-----------( 145      ( 19 Dec 2022 05:16 )             38.4     12-19    144  |  102  |  40<H>  ----------------------------<  100<H>  3.1<L>   |  31  |  1.40<H>    Ca    8.3<L>      19 Dec 2022 05:16  Phos  3.3     12-19  Mg     2.3     12-19    TPro  7.3  /  Alb  3.1<L>  /  TBili  0.5  /  DBili  x   /  AST  24  /  ALT  11  /  AlkPhos  60  12-18       RADIOLOGY  CXR:   improved right aeration     < from: TTE Echo Complete w/o Contrast w/ Doppler (12.17.22 @ 12:52) >    Summary:   1. Biatrial enlargement   2. Left ventricular ejection fraction, by visual estimation, is 45 to 50%.   3. Mildly decreased global left ventricular systolic function.   4. There is mild concentric left ventricular hypertrophy.   5. Moderate mitral valve regurgitation.   6. Moderate thickening and calcification of the anterior and posterior mitral valve leaflets.   7. Mild-moderate tricuspid regurgitation.   8. Mild pulmonic valve regurgitation.   9. Apical septal segment, apex, and basal and mid inferior septum are abnormal as described above.      < end of copied text >    VITALS:  T(C): 36.9 (12-19-22 @ 08:00), Max: 37 (12-19-22 @ 05:00)  T(F): 98.5 (12-19-22 @ 08:00), Max: 98.6 (12-19-22 @ 05:00)  HR: 95 (12-19-22 @ 09:03) (58 - 118)  BP: 164/74 (12-19-22 @ 09:03) (108/65 - 207/98)  BP(mean): 98 (12-19-22 @ 09:03) (76 - 161)  ABP: --  ABP(mean): --  RR: 33 (12-19-22 @ 09:03) (19 - 36)  SpO2: 97% (12-19-22 @ 09:03) (87% - 100%)  CVP(mm Hg): --  CVP(cm H2O): --    Ins and Outs     12-18-22 @ 07:01  -  12-19-22 @ 07:00  --------------------------------------------------------  IN: 620.5 mL / OUT: 2325 mL / NET: -1704.5 mL    12-19-22 @ 07:01  -  12-19-22 @ 10:01  --------------------------------------------------------  IN: 0 mL / OUT: 200 mL / NET: -200 mL        Height (cm): 170.2 (12-17-22 @ 06:41)  Weight (kg): 77.6 (12-17-22 @ 06:41)  BMI (kg/m2): 26.8 (12-17-22 @ 06:41)        I&O's Detail    18 Dec 2022 07:01  -  19 Dec 2022 07:00  --------------------------------------------------------  IN:    Nitroglycerin: 100.5 mL    Oral Fluid: 520 mL  Total IN: 620.5 mL    OUT:    Voided (mL): 2325 mL  Total OUT: 2325 mL    Total NET: -1704.5 mL      19 Dec 2022 07:01  -  19 Dec 2022 10:01  --------------------------------------------------------  IN:  Total IN: 0 mL    OUT:    Voided (mL): 200 mL  Total OUT: 200 mL    Total NET: -200 mL

## 2022-12-19 NOTE — CONSULT NOTE ADULT - SUBJECTIVE AND OBJECTIVE BOX
HPI:  88M PMH atrial fibrillation on eliquis, history of CVA, Hypertension presents for SOB.   Patient conversant is a poor historian.  Unable to provide much information relevant.  Initially seen after being titrated off BIPAP and feeling okay however while waiting in ER patient with increasing BP and suspected of going back into flash pulmonary edema secondary to uncontrolled hypertension and placed back on BIPAP and given additional dose of lasix and labetalol.      Discussed with ICU Pa who agreed to take patient to CCU. (17 Dec 2022 11:26)    Palliative:  Last hospital admission in the [facility] over 1 year ago  PAST MEDICAL & SURGICAL HISTORY:  Afib      Congestive heart failure (CHF)      CVA (cerebral vascular accident)      Hypertension, unspecified type      No significant past surgical history          SOCIAL HISTORY:    Admitted from:  home  Substance abuse history:     deneis         Tobacco hx:                  Alcohol hx:              Home Opioid hx:  Roman Catholic:                                    Preferred Language:    Surrogate/HCP/Guardian:  Lora Banegasgisell          Phone#: [phone]    FAMILY HISTORY:  No pertinent family history in first degree relatives      Baseline ADLs (prior to admission): Living at home    Allergies    No Known Allergies    Intolerances      Present Symptoms:   Dyspnea: y  Nausea/Vomiting: n  Anxiety:n  Depressed n  Fatigue:n  Loss of appetite: n  Pain:      denies                          location:          Review of Systems:  Unable to obtain due to poor mentation    MEDICATIONS  (STANDING):  amLODIPine   Tablet 10 milliGRAM(s) Oral daily  apixaban 5 milliGRAM(s) Oral two times a day  aspirin enteric coated 81 milliGRAM(s) Oral daily  chlorhexidine 2% Cloths 1 Application(s) Topical <User Schedule>  furosemide   Injectable 40 milliGRAM(s) IV Push two times a day  guaiFENesin ER 1200 milliGRAM(s) Oral every 12 hours  hydrALAZINE 25 milliGRAM(s) Oral every 8 hours  losartan 100 milliGRAM(s) Oral daily  metoprolol succinate ER 50 milliGRAM(s) Oral daily  oseltamivir 30 milliGRAM(s) Oral every 12 hours  potassium chloride    Tablet ER 40 milliEquivalent(s) Oral every 4 hours  predniSONE   Tablet 40 milliGRAM(s) Oral daily  senna 2 Tablet(s) Oral at bedtime    MEDICATIONS  (PRN):  hydrALAZINE Injectable 10 milliGRAM(s) IV Push every 2 hours PRN sbp >17mm/hg      PHYSICAL EXAM:    Vital Signs Last 24 Hrs  T(C): 36.9 (19 Dec 2022 08:00), Max: 37 (19 Dec 2022 05:00)  T(F): 98.5 (19 Dec 2022 08:00), Max: 98.6 (19 Dec 2022 05:00)  HR: 79 (19 Dec 2022 11:00) (58 - 118)  BP: 139/64 (19 Dec 2022 11:00) (108/65 - 207/98)  BP(mean): 86 (19 Dec 2022 11:00) (76 - 161)  RR: 26 (19 Dec 2022 11:00) (19 - 33)  SpO2: 94% (19 Dec 2022 11:00) (92% - 100%)    Parameters below as of 19 Dec 2022 11:00  Patient On (Oxygen Delivery Method): nasal cannula, high flow  O2 Flow (L/min): 50  O2 Concentration (%): 50    General: alert  oriented x _2___     slightly confused  HEENT: normal  :  Lungs: bilat rales  CV: normal   GI: normal   : normal  Musculoskeletal:   weakness   Skin: normal    Neuro: mildly confused  Oral intake ability: full capability  Diet: [NPO]    LABS:                        12.4   9.54  )-----------( 145      ( 19 Dec 2022 05:16 )             38.4     12-19    144  |  102  |  40<H>  ----------------------------<  100<H>  3.1<L>   |  31  |  1.40<H>    Ca    8.3<L>      19 Dec 2022 05:16  Phos  3.3     12-19  Mg     2.3     12-19    TPro  7.3  /  Alb  3.1<L>  /  TBili  0.5  /  DBili  x   /  AST  24  /  ALT  11  /  AlkPhos  60  12-18        RADIOLOGY & ADDITIONAL STUDIES:  < from: Xray Chest 1 View- PORTABLE-Urgent (12.17.22 @ 07:28) >  ACC: [id number] EXAM:  XR CHEST PORTABLE URGENT 1V                          PROCEDURE DATE:  12/17/2022          INTERPRETATION:  Chest one view    HISTORY: Chest pain    COMPARISON STUDY: 6/20/2021    Frontal expiratory view of the chest shows the heart to be similarly   enlarged in size. The lungs show at least mild pulmonary congestion with   small left effusion and there is no evidence of pneumothorax nor right   pleural effusion.    IMPRESSION:  Congestive changes as noted.        Thank you for the courtesy of this referral.    --- End of Report ---            SHAMA VANEGAS MD; Attending Interventional Radiologist  This document has been electronically signed. Dec 19 2022 11:18AM    < end of copied text >    ADVANCE DIRECTIVES:   Advanced Care Planning discussion total time spent:

## 2022-12-19 NOTE — PROGRESS NOTE ADULT - ASSESSMENT
Assessment:  Jaylon Antony is an 88 year old man with past medical history of Atrial fibrillation (on Eliquis), HFrEF (LVEF 25-30% in 2019), Hypertension and history of CVA presents with progressive dyspnea found to have acute hypoxic respiratory failure likely multifactorial from Influenza A and acute on chronic systolic heart failure, also with hypertensive emergency.    ECG consistent with atrial fibrillation with RVR. Troponins elevated and have peaked and is likely demand ischemia from CHF, hypertensive emergency and acute renal injury. Echo report consistent with LVEF 45-50%, moderate mitral regurgitation, mild to  moderate tricuspid regurgitation. Prior echo report from 2019 was consistent with LVEF 25-30%. Pro BNP elevated.     Recommendations:  [] NSTEMI: Likely Type II, continue medical management: Aspirin 81 mg daily, Metoprolol succinate 50 mg daily.  [] Acute on chronic systolic heart failure w/ HTN emergency: EF 45-50% which is higher than prior report from 2019 with LVEF 25-30% at that time. Continue IV lasix and nitro drip, monitor urine output and renal function, eventually wean off nitro drip and may consider Hydralazine PO instead. Patient receiving ARB, monitor Cr.  [] Atrial fibrillation: Rate controlled, continue beta blocker. Continue Eliquis for stroke prophylaxis.   [] Influenza A: Treatment per ICU team, wean high flow nasal cannula as tolerated, patient receiving Oseltamivir     We will continue to follow along. Patient follows with cardiologist, Dr. Rubio.     Kevin Hong MD  Cardiology

## 2022-12-19 NOTE — PHARMACOTHERAPY INTERVENTION NOTE - COMMENTS
Met with patient and family; reviewed current medications and counseled on CHF. EF 45-50%. Pt states he only takes Eliquis once a day, furosemide, and metoprolol. Advised patient to take medications as prescribed to prevent adverse cardiovascular events. Also reviewed importance of taking blood pressure medications as prescribed since he came in with SBP in 200s. Pt and family verbalized understanding, all questions answered.

## 2022-12-20 LAB
ALBUMIN SERPL ELPH-MCNC: 2.7 G/DL — LOW (ref 3.3–5)
ALP SERPL-CCNC: 55 U/L — SIGNIFICANT CHANGE UP (ref 40–120)
ALT FLD-CCNC: 13 U/L — SIGNIFICANT CHANGE UP (ref 10–45)
ANION GAP SERPL CALC-SCNC: 8 MMOL/L — SIGNIFICANT CHANGE UP (ref 5–17)
AST SERPL-CCNC: 26 U/L — SIGNIFICANT CHANGE UP (ref 10–40)
BILIRUB SERPL-MCNC: 0.5 MG/DL — SIGNIFICANT CHANGE UP (ref 0.2–1.2)
BUN SERPL-MCNC: 51 MG/DL — HIGH (ref 7–23)
CALCIUM SERPL-MCNC: 8.4 MG/DL — SIGNIFICANT CHANGE UP (ref 8.4–10.5)
CHLORIDE SERPL-SCNC: 103 MMOL/L — SIGNIFICANT CHANGE UP (ref 96–108)
CO2 SERPL-SCNC: 32 MMOL/L — HIGH (ref 22–31)
CREAT SERPL-MCNC: 1.47 MG/DL — HIGH (ref 0.5–1.3)
EGFR: 46 ML/MIN/1.73M2 — LOW
GLUCOSE SERPL-MCNC: 119 MG/DL — HIGH (ref 70–99)
HCT VFR BLD CALC: 37.7 % — LOW (ref 39–50)
HCT VFR BLD CALC: 38.4 % — LOW (ref 39–50)
HGB BLD-MCNC: 12.4 G/DL — LOW (ref 13–17)
HGB BLD-MCNC: 12.8 G/DL — LOW (ref 13–17)
MAGNESIUM SERPL-MCNC: 2.4 MG/DL — SIGNIFICANT CHANGE UP (ref 1.6–2.6)
MCHC RBC-ENTMCNC: 32.2 PG — SIGNIFICANT CHANGE UP (ref 27–34)
MCHC RBC-ENTMCNC: 32.3 GM/DL — SIGNIFICANT CHANGE UP (ref 32–36)
MCHC RBC-ENTMCNC: 33 PG — SIGNIFICANT CHANGE UP (ref 27–34)
MCHC RBC-ENTMCNC: 34 GM/DL — SIGNIFICANT CHANGE UP (ref 32–36)
MCV RBC AUTO: 97.2 FL — SIGNIFICANT CHANGE UP (ref 80–100)
MCV RBC AUTO: 99.7 FL — SIGNIFICANT CHANGE UP (ref 80–100)
NRBC # BLD: 0 /100 WBCS — SIGNIFICANT CHANGE UP (ref 0–0)
NRBC # BLD: 0 /100 WBCS — SIGNIFICANT CHANGE UP (ref 0–0)
PHOSPHATE SERPL-MCNC: 3.2 MG/DL — SIGNIFICANT CHANGE UP (ref 2.5–4.5)
PLATELET # BLD AUTO: 144 K/UL — LOW (ref 150–400)
PLATELET # BLD AUTO: 157 K/UL — SIGNIFICANT CHANGE UP (ref 150–400)
POTASSIUM SERPL-MCNC: 3.8 MMOL/L — SIGNIFICANT CHANGE UP (ref 3.5–5.3)
POTASSIUM SERPL-SCNC: 3.8 MMOL/L — SIGNIFICANT CHANGE UP (ref 3.5–5.3)
PROT SERPL-MCNC: 6.8 G/DL — SIGNIFICANT CHANGE UP (ref 6–8.3)
RBC # BLD: 3.85 M/UL — LOW (ref 4.2–5.8)
RBC # BLD: 3.88 M/UL — LOW (ref 4.2–5.8)
RBC # FLD: 13.2 % — SIGNIFICANT CHANGE UP (ref 10.3–14.5)
RBC # FLD: 13.4 % — SIGNIFICANT CHANGE UP (ref 10.3–14.5)
SODIUM SERPL-SCNC: 143 MMOL/L — SIGNIFICANT CHANGE UP (ref 135–145)
WBC # BLD: 10.17 K/UL — SIGNIFICANT CHANGE UP (ref 3.8–10.5)
WBC # BLD: 9.54 K/UL — SIGNIFICANT CHANGE UP (ref 3.8–10.5)
WBC # FLD AUTO: 10.17 K/UL — SIGNIFICANT CHANGE UP (ref 3.8–10.5)
WBC # FLD AUTO: 9.54 K/UL — SIGNIFICANT CHANGE UP (ref 3.8–10.5)

## 2022-12-20 PROCEDURE — 99233 SBSQ HOSP IP/OBS HIGH 50: CPT

## 2022-12-20 PROCEDURE — 99232 SBSQ HOSP IP/OBS MODERATE 35: CPT

## 2022-12-20 RX ORDER — IPRATROPIUM/ALBUTEROL SULFATE 18-103MCG
3 AEROSOL WITH ADAPTER (GRAM) INHALATION EVERY 6 HOURS
Refills: 0 | Status: DISCONTINUED | OUTPATIENT
Start: 2022-12-20 | End: 2022-12-29

## 2022-12-20 RX ORDER — FUROSEMIDE 40 MG
40 TABLET ORAL DAILY
Refills: 0 | Status: DISCONTINUED | OUTPATIENT
Start: 2022-12-21 | End: 2022-12-22

## 2022-12-20 RX ADMIN — AMLODIPINE BESYLATE 10 MILLIGRAM(S): 2.5 TABLET ORAL at 06:03

## 2022-12-20 RX ADMIN — PANTOPRAZOLE SODIUM 40 MILLIGRAM(S): 20 TABLET, DELAYED RELEASE ORAL at 18:36

## 2022-12-20 RX ADMIN — Medication 30 MILLIGRAM(S): at 21:20

## 2022-12-20 RX ADMIN — APIXABAN 5 MILLIGRAM(S): 2.5 TABLET, FILM COATED ORAL at 09:45

## 2022-12-20 RX ADMIN — Medication 30 MILLIGRAM(S): at 09:45

## 2022-12-20 RX ADMIN — APIXABAN 5 MILLIGRAM(S): 2.5 TABLET, FILM COATED ORAL at 21:19

## 2022-12-20 RX ADMIN — SENNA PLUS 2 TABLET(S): 8.6 TABLET ORAL at 21:23

## 2022-12-20 RX ADMIN — PANTOPRAZOLE SODIUM 40 MILLIGRAM(S): 20 TABLET, DELAYED RELEASE ORAL at 06:01

## 2022-12-20 RX ADMIN — Medication 50 MILLIGRAM(S): at 06:02

## 2022-12-20 RX ADMIN — LOSARTAN POTASSIUM 100 MILLIGRAM(S): 100 TABLET, FILM COATED ORAL at 06:02

## 2022-12-20 RX ADMIN — CHLORHEXIDINE GLUCONATE 1 APPLICATION(S): 213 SOLUTION TOPICAL at 06:02

## 2022-12-20 RX ADMIN — Medication 25 MILLIGRAM(S): at 21:20

## 2022-12-20 RX ADMIN — Medication 25 MILLIGRAM(S): at 06:02

## 2022-12-20 RX ADMIN — Medication 40 MILLIGRAM(S): at 06:01

## 2022-12-20 RX ADMIN — Medication 81 MILLIGRAM(S): at 13:21

## 2022-12-20 RX ADMIN — Medication 25 MILLIGRAM(S): at 13:21

## 2022-12-20 RX ADMIN — Medication 1200 MILLIGRAM(S): at 06:02

## 2022-12-20 RX ADMIN — Medication 40 MILLIGRAM(S): at 06:02

## 2022-12-20 RX ADMIN — Medication 3 MILLILITER(S): at 16:45

## 2022-12-20 RX ADMIN — Medication 1200 MILLIGRAM(S): at 18:34

## 2022-12-20 NOTE — PROGRESS NOTE ADULT - ASSESSMENT
Assessment:  Jaylon Antony is an 88 year old man with past medical history of Atrial fibrillation (on Eliquis), HFrEF (LVEF 25-30% in 2019), Hypertension and history of CVA presents with progressive dyspnea found to have acute hypoxic respiratory failure likely multifactorial from Influenza A and acute on chronic systolic heart failure, also with hypertensive emergency.    ECG consistent with atrial fibrillation with RVR. Troponins elevated and have peaked and is likely demand ischemia from CHF, hypertensive emergency and acute renal injury. Echo report consistent with LVEF 45-50%, moderate mitral regurgitation, mild to  moderate tricuspid regurgitation. Prior echo report from 2019 was consistent with LVEF 25-30%. Pro BNP elevated.     Recommendations:  [] NSTEMI: Likely Type II, continue medical management: Aspirin 81 mg daily, Metoprolol succinate 50 mg daily.  [] Acute on chronic systolic heart failure w/ HTN emergency: EF 45-50% which is higher than prior report from 2019 with LVEF 25-30% at that time. Continue IV lasix, nitro drip weaned off and patient receiving Hydralazine, BP stable. Patient receiving ARB, monitor Cr.  [] Atrial fibrillation: Rate controlled, continue beta blocker. Continue Eliquis for stroke prophylaxis, noted to be guaic positive- consider further evaluation per ICU team  [] Influenza A: Treatment per ICU team, wean high flow nasal cannula as tolerated, patient receiving Oseltamivir     We will continue to follow along. Patient follows with cardiologist, Dr. Rubio.     Kevin Hong MD  Cardiology

## 2022-12-20 NOTE — PROGRESS NOTE ADULT - SUBJECTIVE AND OBJECTIVE BOX
Progress: More awake.  Less dyspnea, no distress    Present Symptoms:   Dyspnea: mild  Nausea/Vomiting: n  Anxiety:  n  Depressed Mood: n  Fatigue: n  Loss of appetite: n  Pain:       denies            location:   Review of Systems: [All others negative or Unable to obtain due to poor mentation]    MEDICATIONS  (STANDING):  albuterol/ipratropium for Nebulization 3 milliLiter(s) Nebulizer every 6 hours  amLODIPine   Tablet 10 milliGRAM(s) Oral daily  apixaban 5 milliGRAM(s) Oral two times a day  aspirin enteric coated 81 milliGRAM(s) Oral daily  chlorhexidine 2% Cloths 1 Application(s) Topical <User Schedule>  furosemide   Injectable 40 milliGRAM(s) IV Push daily  guaiFENesin ER 1200 milliGRAM(s) Oral every 12 hours  hydrALAZINE 25 milliGRAM(s) Oral every 8 hours  losartan 100 milliGRAM(s) Oral daily  metoprolol succinate ER 50 milliGRAM(s) Oral daily  oseltamivir 30 milliGRAM(s) Oral every 12 hours  pantoprazole  Injectable 40 milliGRAM(s) IV Push two times a day  predniSONE   Tablet 40 milliGRAM(s) Oral daily  senna 2 Tablet(s) Oral at bedtime    MEDICATIONS  (PRN):  hydrALAZINE Injectable 10 milliGRAM(s) IV Push every 2 hours PRN sbp >17mm/hg      PHYSICAL EXAM:  Vital Signs Last 24 Hrs  T(C): 36.5 (20 Dec 2022 05:00), Max: 36.7 (19 Dec 2022 20:00)  T(F): 97.7 (20 Dec 2022 05:00), Max: 98.1 (19 Dec 2022 20:00)  HR: 73 (20 Dec 2022 16:00) (58 - 92)  BP: 121/65 (20 Dec 2022 16:00) (100/52 - 156/72)  BP(mean): 83 (20 Dec 2022 16:00) (68 - 142)  RR: 25 (20 Dec 2022 16:00) (18 - 29)  SpO2: 98% (20 Dec 2022 16:00) (93% - 100%)    Parameters below as of 20 Dec 2022 16:00  Patient On (Oxygen Delivery Method): nasal cannula  O2 Flow (L/min): 5    General: alert  oriented x ____      HEENT: normal    Lungs: comfortable   CV: normal    GI: normal    : normal    Musculoskeletal: normal   Skin: normal    Neuro: no deficits   Oral intake ability:  oral feeding  Diet: NPO,     LABS:                          12.4   10.17 )-----------( 144      ( 20 Dec 2022 05:50 )             38.4     12-20    143  |  103  |  51<H>  ----------------------------<  119<H>  3.8   |  32<H>  |  1.47<H>    Ca    8.4      20 Dec 2022 05:50  Phos  3.2     12-20  Mg     2.4     12-20    TPro  6.8  /  Alb  2.7<L>  /  TBili  0.5  /  DBili  x   /  AST  26  /  ALT  13  /  AlkPhos  55  12-20        RADIOLOGY & ADDITIONAL STUDIES:    ADVANCE DIRECTIVES:  Advanced Care Planning discussion total time spent:

## 2022-12-20 NOTE — PROGRESS NOTE ADULT - ASSESSMENT
Physical Examination:  GENERAL:               Alert,  no acute distress.    HEENT:                    + JVD, Moist MM  PULM:                     Bilateral air entry, Clear to auscultation bilaterally, no significant sputum production, no Rales, No Rhonchi, ++ Wheezing  CVS:                         S1, S2,  +Murmur  ABD:                        Soft, nondistended, nontender, normoactive bowel sounds,   EXT:                         mild edema, nontender, No Cyanosis or Clubbing   NEURO:                  Alert, oriented, interactive, nonfocal, follows commands  PSYC:                      Calm, + Insight.        88 year old male with a PMH of CVA, CHF, HTN, afib on eliquis, hip fx s/p fall and prior intubation for HF exacerbation presenting to [facility] ED for SOB and CP. Pt noted to be hypoxemic with elevated sbp to 200's. Pt tx with NIV, lasix and nitro with +response. ICU team consulted. Influenza A + with mild elevated troponin, elevated bnp and ?IRINEO on labs. When we arrived pt was alert and oriented, with O2 sat 100%, rate controlled afib on monitor and sbp 140. Pt trial off bipap with NC ~4 liters without desaturation or respiratory distress. ABG obtained with the following results: 7.37 /  45 / 87 / 26 97% on 4 liters NC. Troponin mildly elevated and has stabilized with no major change and ekg is without acute ischemia noted currently appearing to be demand ischemia at this time (also he is on AC at home), cardiology contacted by ED MD. BNP elevated to 6k. Given pt appears comfortable and states that he is "feeling better" along with his neurological, respiratory (off NIV) and hemodynamic stability  he is not currently a candidate for ICU and is appropriate for monitoring on telemetry under medicine service. However, if any changes should occur please contact our team immediately. Thank you. Recommending the following:    Assessment  1. Influenza A  2. 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 Elevated trop, r/o NSTEMI  4. Underlying Afib on Eliquis, HTN, CVA,     Plan   Continue home BP meds  n/c o2 to maintain sat  can d/c high flow and NIV  Oral diet  Lasix Daily   prednisone oral    start duoneb atc  ASA, A/c with eliquis  palliative care f/u for [facility]  Cardio Follow up     PMD:				                   Notified(Date):  Family Updated: 	DTR Lora 	                Date: 12/20/22   - updated on status     Sedation & Analgesia:	  Diet/Nutrition:		Diet, Minced and Moist:   Consistent Carbohydrate No Snacks  DASH/TLC Sodium & Cholesterol Restricted (12-18-22 @ 08:09) [Active]     GI PPx:			  senna 2 Tablet(s) Oral at bedtime     DVT Ppx:		  apixaban 5 milliGRAM(s) Oral two times a day  aspirin enteric coated 81 milliGRAM(s) Oral daily      Activity:		    Head of Bed:               35-45 Deg  Glycemic Control:              Lines: PIV    Restraints were deemed necessary to prevent removal of life-sustaining devices [  ] YES   [ x   ]  NO    Disposition: Possible transfer to medical floor     Goals of Care: Full code

## 2022-12-20 NOTE — PROGRESS NOTE ADULT - SUBJECTIVE AND OBJECTIVE BOX
RAD SINGLETON  [id number]        Chief Complaint: Influenza A/Hypertensive emergency/CHF    Interval History: The patient remains on 50% high flow nasal cannula, still has chest congestion.     Tele: atrial fibrillation 70s BPM      Current meds:   amLODIPine   Tablet 10 milliGRAM(s) Oral daily  apixaban 5 milliGRAM(s) Oral two times a day  aspirin enteric coated 81 milliGRAM(s) Oral daily  chlorhexidine 2% Cloths 1 Application(s) Topical <User Schedule>  furosemide   Injectable 40 milliGRAM(s) IV Push two times a day  guaiFENesin ER 1200 milliGRAM(s) Oral every 12 hours  hydrALAZINE 25 milliGRAM(s) Oral every 8 hours  hydrALAZINE Injectable 10 milliGRAM(s) IV Push every 2 hours PRN  losartan 100 milliGRAM(s) Oral daily  metoprolol succinate ER 50 milliGRAM(s) Oral daily  oseltamivir 30 milliGRAM(s) Oral every 12 hours  pantoprazole  Injectable 40 milliGRAM(s) IV Push two times a day  predniSONE   Tablet 40 milliGRAM(s) Oral daily  senna 2 Tablet(s) Oral at bedtime      Objective:     Vital Signs:   T(C): 36.5 (12-20-22 @ 05:00), Max: 37 (12-19-22 @ 12:00)  HR: 72 (12-20-22 @ 08:00) (58 - 95)  BP: 132/65 (12-20-22 @ 08:00) (116/71 - 182/120)  RR: 24 (12-20-22 @ 08:00) (18 - 33)  SpO2: 99% (12-20-22 @ 08:00) (94% - 100%)  Wt(kg): --    Physical Exam:   General: elderly man on high flow nasal cannula   Neck: supple   CVS: JVP difficult to assess 2/2 high flow nasal cannula, irregularly irregular, s1, s2  Pulm: labored respirations, coarse breath sounds  Ext: no lower extremity edema b/l  Neuro: A&O x3  Psych: Normal affect    Labs:   20 Dec 2022 05:50    143    |  103    |  51     ----------------------------<  119    3.8     |  32     |  1.47     Ca    8.4        20 Dec 2022 05:50  Phos  3.2       20 Dec 2022 05:50  Mg     2.4       20 Dec 2022 05:50    TPro  6.8    /  Alb  2.7    /  TBili  0.5    /  DBili  x      /  AST  26     /  ALT  13     /  AlkPhos  55     20 Dec 2022 05:50                          12.4   10.17 )-----------( 144      ( 20 Dec 2022 05:50 )             38.4             ECG (1217/22): atrial fibrillation with RVR    TTE (12/17/22):   1. Biatrial enlargement   2. Left ventricular ejection fraction, by visual estimation, is 45 to 50%.   3. Mildly decreased global left ventricular systolic function.   4. There is mild concentric left ventricular hypertrophy.   5. Moderate mitral valve regurgitation.   6. Moderate thickening and calcification of the anterior and posterior mitral valve leaflets.   7. Mild-moderate tricuspid regurgitation.   8. Mild pulmonic valve regurgitation.   9. Apical septal segment, apex, and basal and mid inferior septum are abnormal as described above.

## 2022-12-20 NOTE — PROGRESS NOTE ADULT - ASSESSMENT
Palliative:  continues to improve clinically.  No palliative recommendations at this time.  Wew will be montoring clinical progress        88 year old male with a PMH of CVA, CHF, HTN, afib on eliquis, hip fx s/p fall and prior intubation for HF exacerbation presenting to [facility] ED for SOB and CP. Pt noted to be hypoxemic with elevated sbp to 200's. Pt tx with NIV, lasix and nitro with +response. ICU team consulted. Influenza A + with mild elevated troponin, elevated bnp and ?IRINEO on labs. When we arrived pt was alert and oriented, with O2 sat 100%, rate controlled afib on monitor and sbp 140. Pt trial off bipap with NC ~4 liters without desaturation or respiratory distress. ABG obtained with the following results: 7.37 /  45 / 87 / 26 97% on 4 liters NC. Troponin mildly elevated and has stabilized with no major change and ekg is without acute ischemia noted currently appearing to be demand ischemia at this time (also he is on AC at home), cardiology contacted by ED MD. BNP elevated to 6k. Given pt appears comfortable and states that he is "feeling better" along with his neurological, respiratory (off NIV) and hemodynamic stability  he is not currently a candidate for ICU and is appropriate for monitoring on telemetry under medicine service. However, if any changes should occur please contact our team immediately. Thank you. Recommending the following:    Assessment  1. Influenza A  2. 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 Elevated trop, r/o NSTEMI  4. Underlying Afib on Eliquis, HTN, CVA,     Plan   Continue home BP meds  n/c o2 to maintain sat  can d/c high flow and NIV  Oral diet  Lasix Daily   prednisone oral    start duoneb atc  ASA, A/c with eliquis  palliative care f/u for [facility]  Cardio Follow up     Goals of Care: Full code

## 2022-12-20 NOTE — PROGRESS NOTE ADULT - ASSESSMENT
PLAN:  -joni blood pressure control with regimen of losartan, hydralazine, and lopressor  -has remained off BiPAP, use only PRN, nocturnal  -daily lasix, follow lytes  -finish course of tamiflu, maitnain approrpaite precautions  -on eliquis  -AM labs swelling of lower extremities

## 2022-12-20 NOTE — PROGRESS NOTE ADULT - SUBJECTIVE AND OBJECTIVE BOX
F/U Note:    88y Male admitted with with HTN urgency, flash pulm. edema/CHF as well as Influenza A (+), required initation of BiPAP and nitro gtt    Interval Hx:  -BP controlled, off nitro  -off BiPAP for the day, not using overnight      Vital Signs Last 24 Hrs  T(C): 36.6 (20 Dec 2022 21:00), Max: 36.7 (20 Dec 2022 17:00)  T(F): 97.8 (20 Dec 2022 21:00), Max: 98.1 (20 Dec 2022 17:00)  HR: 76 (21 Dec 2022 03:00) (60 - 90)  BP: 150/75 (21 Dec 2022 03:00) (100/52 - 156/72)  BP(mean): 97 (21 Dec 2022 03:00) (68 - 142)  RR: 23 (21 Dec 2022 03:00) (18 - 29)  SpO2: 98% (21 Dec 2022 03:00) (93% - 100%)    Parameters below as of 21 Dec 2022 04:00  Patient On (Oxygen Delivery Method): nasal cannula  O2 Flow (L/min): 5                              12.4   10.17 )-----------( 144      ( 20 Dec 2022 05:50 )             38.4         12-20    143  |  103  |  51<H>  ----------------------------<  119<H>  3.8   |  32<H>  |  1.47<H>    Ca    8.4      20 Dec 2022 05:50  Phos  3.2     12-20  Mg     2.4     12-20    TPro  6.8  /  Alb  2.7<L>  /  TBili  0.5  /  DBili  x   /  AST  26  /  ALT  13  /  AlkPhos  55  12-20        NEURO: no headaches, blurry vision, tremors, depression, anxity  CV: no chest pain, palpitations, murmurs, orthopnea  Resp: no shortness of breath, cough, wheeze, sputum production  GI: no stomach pain,nausea, vomitting, flatulence, hematemesis, hematochezia  PV: no swelling of extremities, no hair loss, no coolness to extremities  ENDO: no polydypsia, polyphagia, polyuria, weight loss, night sweats          NEURO: awake and alert  CV: (+) S1/S2, irregularly irregular, no MRG   RESP: CTA b/l  GI: soft, non tender

## 2022-12-20 NOTE — CHART NOTE - NSCHARTNOTEFT_GEN_A_CORE
Nutrition Follow Up Note  Hospital Course (Per Electronic Medical Record):   Source: Medical Record [X] Patient [X] Nursing Staff [X]     Diet: consistent carbohydrate  , minced moist , DASH/TLC    patient reports a god appetite , consumed 100% of breakfast meal, po intake appears to be improving since admission , no GI upset reported .(+) occult feces  Hgb/Hct 38.4g/dl , WDL , patient receiving a minced moist diet due to poor dentition . Consistent Carbohydrate restriction noted due to (+) steroids & patient with elevated blood glucose     Current Weight:   (12/20) 171/77.6kg   (12/19) 171.8/78kg     Pertinent Medications: MEDICATIONS  (STANDING):  albuterol/ipratropium for Nebulization 3 milliLiter(s) Nebulizer every 6 hours  amLODIPine   Tablet 10 milliGRAM(s) Oral daily  apixaban 5 milliGRAM(s) Oral two times a day  aspirin enteric coated 81 milliGRAM(s) Oral daily  chlorhexidine 2% Cloths 1 Application(s) Topical <User Schedule>  furosemide   Injectable 40 milliGRAM(s) IV Push daily  guaiFENesin ER 1200 milliGRAM(s) Oral every 12 hours  hydrALAZINE 25 milliGRAM(s) Oral every 8 hours  losartan 100 milliGRAM(s) Oral daily  metoprolol succinate ER 50 milliGRAM(s) Oral daily  oseltamivir 30 milliGRAM(s) Oral every 12 hours  pantoprazole  Injectable 40 milliGRAM(s) IV Push two times a day  predniSONE   Tablet 40 milliGRAM(s) Oral daily  senna 2 Tablet(s) Oral at bedtime    MEDICATIONS  (PRN):  hydrALAZINE Injectable 10 milliGRAM(s) IV Push every 2 hours PRN sbp >17mm/hg      Pertinent Labs:  12-20 Na143 mmol/L Glu 119 mg/dL<H> K+ 3.8 mmol/L Cr  1.47 mg/dL<H> BUN 51 mg/dL<H> 12-20 Phos 3.2 mg/dL 12-20 Alb 2.7 g/dL<L>        Skin: ecchymotic     Edema: (+1) LE     Last BM: (12/19)     Estimated Needs:   [X] No Change since Previous Assessment      Previous Nutrition Diagnosis: Food & Nutrition Related Knowledge Deficit     Nutrition Diagnosis is [X] resolved          New Nutrition Diagnosis: [X] Not Applicable      Interventions:   1. continue current diet       Monitoring & Evaluation: will monitor:  [X] Weights   [X] PO Intake   [X] Follow Up (Per Protocol)  [X] Tolerance to Diet Prescription       RD to follow as per Nutrition protocol  Lilia Valle RDN

## 2022-12-20 NOTE — PROGRESS NOTE ADULT - SUBJECTIVE AND OBJECTIVE BOX
Follow-up Critical Care Progress Note  Chief Complaint : Heart failure      patient seen and examined  comfortable  upset in am as breakfast was late  cough/sob less        Allergies :No Known Allergies      PAST MEDICAL & SURGICAL HISTORY:  Afib    Congestive heart failure (CHF)    CVA (cerebral vascular accident)    Hypertension, unspecified type    No significant past surgical history        Medications:  MEDICATIONS  (STANDING):  amLODIPine   Tablet 10 milliGRAM(s) Oral daily  apixaban 5 milliGRAM(s) Oral two times a day  aspirin enteric coated 81 milliGRAM(s) Oral daily  chlorhexidine 2% Cloths 1 Application(s) Topical <User Schedule>  furosemide   Injectable 40 milliGRAM(s) IV Push two times a day  guaiFENesin ER 1200 milliGRAM(s) Oral every 12 hours  hydrALAZINE 25 milliGRAM(s) Oral every 8 hours  losartan 100 milliGRAM(s) Oral daily  metoprolol succinate ER 50 milliGRAM(s) Oral daily  oseltamivir 30 milliGRAM(s) Oral every 12 hours  pantoprazole  Injectable 40 milliGRAM(s) IV Push two times a day  predniSONE   Tablet 40 milliGRAM(s) Oral daily  senna 2 Tablet(s) Oral at bedtime    MEDICATIONS  (PRN):  hydrALAZINE Injectable 10 milliGRAM(s) IV Push every 2 hours PRN sbp >17mm/hg      Antibiotics History  oseltamivir 75 milliGRAM(s) Oral once, 12-18-22 @ 20:55, Stop order after: 1 Doses  oseltamivir 30 milliGRAM(s) Oral every 12 hours, 12-19-22 @ 09:00, Stop order after: 4 Days      Heme Medications   apixaban 5 milliGRAM(s) Oral two times a day, 12-17-22 @ 11:33  aspirin enteric coated 81 milliGRAM(s) Oral daily, 12-17-22 @ 11:33      GI Medications  pantoprazole  Injectable 40 milliGRAM(s) IV Push two times a day, 12-19-22 @ 21:05, Routine  senna 2 Tablet(s) Oral at bedtime, 12-17-22 @ 11:34, Routine      COVID  12-17-22 @ 07:00  COVID -   NotDetec  06-15-21 @ 13:45  COVID -   NotDetec  06-12-21 @ 20:51  COVID -   NotDetec  02-17-21 @ 19:00  COVID -   Detected<!!>  08-25-20 @ 21:24  COVID -   NotDetec           Trend Cardiac Enzymes  12-19-22 @ 05:16  BZC-FIQOD-JDMUT-CPKMM/Trop I - -- - --  - --  -  --  /  105.5<H>  12-18-22 @ 06:45  TCX-IRKOY-GAPHS-CPKMM/Trop I - -- - --  - --  -  --  /  113.9<H>  12-17-22 @ 19:30  PKA-GZTOT-RYZXV-CPKMM/Trop I - 86 - --  - --  -  --  /  109.3<H>    Trend BNP  12-18-22 @ 06:45   -  5627<H>  12-17-22 @ 07:00   -  6085<H>  06-20-21 @ 14:20   -  5333<H>    Procalcitonin Trend  12-17-22 @ 19:30   -   0.23<H>    WBC Trend  12-20-22 @ 05:50   -  10.17  12-20-22 @ 00:15   -  9.54  12-19-22 @ 05:16   -  9.54  12-18-22 @ 06:45   -  9.01    H/H Trend  12-20-22 @ 05:50   -   12.4<L>/ 38.4<L>  12-20-22 @ 00:15   -   12.8<L>/ 37.7<L>  12-19-22 @ 05:16   -   12.4<L>/ 38.4<L>  12-18-22 @ 06:45   -   13.1/ 40.7  12-17-22 @ 07:00   -   14.0/ 43.2    Stool Occult Blood  12-19-22 @ 18:10   -   Positive<!>    Platelet Trend  12-20-22 @ 05:50   -  144<L>  12-20-22 @ 00:15   -  157  12-19-22 @ 05:16   -  145<L>  12-18-22 @ 06:45   -  155    Trend Sodium  12-20-22 @ 05:50   -  143  12-19-22 @ 05:16   -  144  12-18-22 @ 06:45   -  145  12-17-22 @ 19:30   -  144  12-17-22 @ 13:27   -  145    Trend Potassium  12-20-22 @ 05:50   -  3.8  12-19-22 @ 05:16   -  3.1<L>  12-18-22 @ 06:45   -  4.1  12-17-22 @ 19:30   -  3.5  12-17-22 @ 13:27   -  3.5    Trend Bun/Cr  12-20-22 @ 05:50  BUN/CR -  51<H> / 1.47<H>  12-19-22 @ 05:16  BUN/CR -  40<H> / 1.40<H>  12-18-22 @ 06:45  BUN/CR -  28<H> / 1.40<H>  12-17-22 @ 19:30  BUN/CR -  22 / 1.32<H>  12-17-22 @ 13:27  BUN/CR -  24<H> / 1.44<H>    Lactic Acid Trend    ABG Trend  12-17-22 @ 10:35   - 7.37/45/87/97.0  09-30-19 @ 04:38   - 7.41/35/305<H>/>99<H>  09-30-19 @ 01:28   - 7.18<LL>/57<H>/103/95    Trend AST/ALT/ALK Phos/Bili  12-20-22 @ 05:50   26/13/55/0.5  12-18-22 @ 06:45   24/11/60/0.5  12-17-22 @ 07:00   25/13/68/0.7  06-20-21 @ 14:20   51<H>/34/69/1.9<H>  06-12-21 @ 19:30   18/13/53/0.3  08-25-20 @ 21:24   29/18/50/0.6     Albumin Trend  12-20-22 @ 05:50   -   2.7<L>  12-18-22 @ 06:45   -   3.1<L>  12-17-22 @ 07:00   -   3.7  06-20-21 @ 14:20   -   2.6<L>  06-13-21 @ 01:47   -   3.5  06-12-21 @ 19:30   -   4.0      PTT - PT - INR Trend  06-13-21 @ 05:18   -   31.1 - 13.7<H> - 1.15  06-12-21 @ 22:04   -   30.9 - 14.6<H> - 1.23<H>    Glucose Trend  12-20-22 @ 05:50   -  -- 119<H> --  12-19-22 @ 05:16   -  -- 100<H> --  12-18-22 @ 06:45   -  -- 113<H> --  12-17-22 @ 19:30   -  -- 104<H> --  12-17-22 @ 13:27   -  -- 102<H> --        LABS:                        12.4   10.17 )-----------( 144      ( 20 Dec 2022 05:50 )             38.4     12-20    143  |  103  |  51<H>  ----------------------------<  119<H>  3.8   |  32<H>  |  1.47<H>    Ca    8.4      20 Dec 2022 05:50  Phos  3.2     12-20  Mg     2.4     12-20    TPro  6.8  /  Alb  2.7<L>  /  TBili  0.5  /  DBili  x   /  AST  26  /  ALT  13  /  AlkPhos  55  12-20          VITALS:  T(C): 36.5 (12-20-22 @ 05:00), Max: 37 (12-19-22 @ 12:00)  T(F): 97.7 (12-20-22 @ 05:00), Max: 98.6 (12-19-22 @ 12:00)  HR: 88 (12-20-22 @ 09:44) (58 - 92)  BP: 133/71 (12-20-22 @ 09:00) (116/71 - 164/105)  BP(mean): 90 (12-20-22 @ 09:00) (77 - 134)  ABP: --  ABP(mean): --  RR: 26 (12-20-22 @ 09:00) (18 - 29)  SpO2: 93% (12-20-22 @ 09:44) (93% - 100%)  CVP(mm Hg): --  CVP(cm H2O): --    Ins and Outs     12-19-22 @ 07:01  -  12-20-22 @ 07:00  --------------------------------------------------------  IN: 610 mL / OUT: 850 mL / NET: -240 mL    12-20-22 @ 07:01  -  12-20-22 @ 11:12  --------------------------------------------------------  IN: 0 mL / OUT: 300 mL / NET: -300 mL                I&O's Detail    19 Dec 2022 07:01  -  20 Dec 2022 07:00  --------------------------------------------------------  IN:    Oral Fluid: 610 mL  Total IN: 610 mL    OUT:    Voided (mL): 850 mL  Total OUT: 850 mL    Total NET: -240 mL      20 Dec 2022 07:01  -  20 Dec 2022 11:12  --------------------------------------------------------  IN:  Total IN: 0 mL    OUT:    Voided (mL): 300 mL  Total OUT: 300 mL    Total NET: -300 mL

## 2022-12-21 LAB
ANION GAP SERPL CALC-SCNC: 11 MMOL/L — SIGNIFICANT CHANGE UP (ref 5–17)
BUN SERPL-MCNC: 62 MG/DL — HIGH (ref 7–23)
CALCIUM SERPL-MCNC: 8.7 MG/DL — SIGNIFICANT CHANGE UP (ref 8.4–10.5)
CHLORIDE SERPL-SCNC: 102 MMOL/L — SIGNIFICANT CHANGE UP (ref 96–108)
CO2 SERPL-SCNC: 29 MMOL/L — SIGNIFICANT CHANGE UP (ref 22–31)
CREAT SERPL-MCNC: 1.45 MG/DL — HIGH (ref 0.5–1.3)
EGFR: 46 ML/MIN/1.73M2 — LOW
GLUCOSE SERPL-MCNC: 112 MG/DL — HIGH (ref 70–99)
HCT VFR BLD CALC: 40.1 % — SIGNIFICANT CHANGE UP (ref 39–50)
HGB BLD-MCNC: 12.9 G/DL — LOW (ref 13–17)
MAGNESIUM SERPL-MCNC: 2.2 MG/DL — SIGNIFICANT CHANGE UP (ref 1.6–2.6)
MCHC RBC-ENTMCNC: 31.6 PG — SIGNIFICANT CHANGE UP (ref 27–34)
MCHC RBC-ENTMCNC: 32.2 GM/DL — SIGNIFICANT CHANGE UP (ref 32–36)
MCV RBC AUTO: 98.3 FL — SIGNIFICANT CHANGE UP (ref 80–100)
NRBC # BLD: 0 /100 WBCS — SIGNIFICANT CHANGE UP (ref 0–0)
NT-PROBNP SERPL-SCNC: 2857 PG/ML — HIGH (ref 0–300)
PHOSPHATE SERPL-MCNC: 3.6 MG/DL — SIGNIFICANT CHANGE UP (ref 2.5–4.5)
PLATELET # BLD AUTO: 168 K/UL — SIGNIFICANT CHANGE UP (ref 150–400)
POTASSIUM SERPL-MCNC: 3.5 MMOL/L — SIGNIFICANT CHANGE UP (ref 3.5–5.3)
POTASSIUM SERPL-SCNC: 3.5 MMOL/L — SIGNIFICANT CHANGE UP (ref 3.5–5.3)
RBC # BLD: 4.08 M/UL — LOW (ref 4.2–5.8)
RBC # FLD: 13.3 % — SIGNIFICANT CHANGE UP (ref 10.3–14.5)
SODIUM SERPL-SCNC: 142 MMOL/L — SIGNIFICANT CHANGE UP (ref 135–145)
TROPONIN I, HIGH SENSITIVITY RESULT: 79.6 NG/L — HIGH
WBC # BLD: 14.03 K/UL — HIGH (ref 3.8–10.5)
WBC # FLD AUTO: 14.03 K/UL — HIGH (ref 3.8–10.5)

## 2022-12-21 PROCEDURE — 99233 SBSQ HOSP IP/OBS HIGH 50: CPT

## 2022-12-21 PROCEDURE — 99232 SBSQ HOSP IP/OBS MODERATE 35: CPT

## 2022-12-21 PROCEDURE — 71045 X-RAY EXAM CHEST 1 VIEW: CPT | Mod: 26

## 2022-12-21 RX ORDER — POTASSIUM CHLORIDE 20 MEQ
40 PACKET (EA) ORAL ONCE
Refills: 0 | Status: COMPLETED | OUTPATIENT
Start: 2022-12-21 | End: 2022-12-21

## 2022-12-21 RX ADMIN — Medication 81 MILLIGRAM(S): at 13:38

## 2022-12-21 RX ADMIN — APIXABAN 5 MILLIGRAM(S): 2.5 TABLET, FILM COATED ORAL at 09:44

## 2022-12-21 RX ADMIN — Medication 25 MILLIGRAM(S): at 22:41

## 2022-12-21 RX ADMIN — AMLODIPINE BESYLATE 10 MILLIGRAM(S): 2.5 TABLET ORAL at 05:42

## 2022-12-21 RX ADMIN — Medication 3 MILLILITER(S): at 08:40

## 2022-12-21 RX ADMIN — SENNA PLUS 2 TABLET(S): 8.6 TABLET ORAL at 22:41

## 2022-12-21 RX ADMIN — Medication 3 MILLILITER(S): at 15:07

## 2022-12-21 RX ADMIN — Medication 40 MILLIEQUIVALENT(S): at 09:45

## 2022-12-21 RX ADMIN — Medication 1200 MILLIGRAM(S): at 18:09

## 2022-12-21 RX ADMIN — PANTOPRAZOLE SODIUM 40 MILLIGRAM(S): 20 TABLET, DELAYED RELEASE ORAL at 05:42

## 2022-12-21 RX ADMIN — Medication 40 MILLIGRAM(S): at 05:42

## 2022-12-21 RX ADMIN — PANTOPRAZOLE SODIUM 40 MILLIGRAM(S): 20 TABLET, DELAYED RELEASE ORAL at 18:10

## 2022-12-21 RX ADMIN — LOSARTAN POTASSIUM 100 MILLIGRAM(S): 100 TABLET, FILM COATED ORAL at 05:42

## 2022-12-21 RX ADMIN — Medication 50 MILLIGRAM(S): at 05:42

## 2022-12-21 RX ADMIN — Medication 40 MILLIGRAM(S): at 05:41

## 2022-12-21 RX ADMIN — Medication 1200 MILLIGRAM(S): at 05:42

## 2022-12-21 RX ADMIN — Medication 3 MILLILITER(S): at 22:18

## 2022-12-21 RX ADMIN — Medication 30 MILLIGRAM(S): at 22:41

## 2022-12-21 RX ADMIN — APIXABAN 5 MILLIGRAM(S): 2.5 TABLET, FILM COATED ORAL at 22:51

## 2022-12-21 RX ADMIN — Medication 30 MILLIGRAM(S): at 09:45

## 2022-12-21 RX ADMIN — CHLORHEXIDINE GLUCONATE 1 APPLICATION(S): 213 SOLUTION TOPICAL at 05:43

## 2022-12-21 RX ADMIN — Medication 25 MILLIGRAM(S): at 05:42

## 2022-12-21 NOTE — PROGRESS NOTE ADULT - ASSESSMENT
PLAN:     PLAN:  -joni blood pressure control with regimen of losartan, hydralazine, and lopressor  -has remained off BiPAP, use only PRN, nocturnal  -daily lasix, follow lytes  -finish course of tamiflu, maitnain approrpaite precautions  -on eliquis  -AM labs

## 2022-12-21 NOTE — PROGRESS NOTE ADULT - SUBJECTIVE AND OBJECTIVE BOX
F/U Note:    88y Male admitted with  HTN urgency, flash pulm. edema/CHF as well as Influenza A (+), required initation of BiPAP and nitro gtt      Interval Hx:  -improved, will transfer out of ICU      Vital Signs Last 24 Hrs  T(C): 36.6 (21 Dec 2022 20:51), Max: 36.6 (20 Dec 2022 21:00)  T(F): 97.8 (21 Dec 2022 20:51), Max: 97.8 (20 Dec 2022 21:00)  HR: 58 (21 Dec 2022 20:51) (51 - 95)  BP: 159/70 (21 Dec 2022 20:51) (102/76 - 162/128)  BP(mean): 124 (21 Dec 2022 18:00) (70 - 139)  RR: 17 (21 Dec 2022 20:51) (13 - 28)  SpO2: 96% (21 Dec 2022 20:51) (93% - 99%)    Parameters below as of 21 Dec 2022 20:51  Patient On (Oxygen Delivery Method): nasal cannula  O2 Flow (L/min): 3                              12.9   14.03 )-----------( 168      ( 21 Dec 2022 05:45 )             40.1         12-21    142  |  102  |  62<H>  ----------------------------<  112<H>  3.5   |  29  |  1.45<H>    Ca    8.7      21 Dec 2022 05:45  Phos  3.6     12-21  Mg     2.2     12-21    TPro  6.8  /  Alb  2.7<L>  /  TBili  0.5  /  DBili  x   /  AST  26  /  ALT  13  /  AlkPhos  55  12-20        NEURO: no headaches, blurry vision, tremors, depression, anxity  CV: no chest pain, palpitations, murmurs, orthopnea  Resp: no shortness of breath, cough, wheeze, sputum production  GI: no stomach pain,nausea, vomitting, flatulence, hematemesis, hematochezia  PV: no swelling of extremities, no hair loss, no coolness to extremities  ENDO: no polydypsia, polyphagia, polyuria, weight loss, night sweats          NEURO: awake and alert  CV: (+) S1/S2, rrr, no mrg  RESP: CTA b/l  GI: soft, non tender

## 2022-12-21 NOTE — PROGRESS NOTE ADULT - ASSESSMENT
88 year old male with a PMH of CVA, CHF, HTN, afib on eliquis, hip fx s/p fall and prior intubation for HF exacerbation presenting to [facility] ED for SOB and CP. Pt noted to be hypoxemic with elevated sbp to 200's. Pt tx with NIV, lasix and nitro with +response. Pt admitted to ICU for management of:    Influenza A  HF exacerbation with pulmonary edema  HTN urgency / emergency  ?copd component  afib  Palliative:  clinically stable.  Noacute palliative needs at this time.    Pt has since improved and remains stable neurologically, with stable blood pressures. Respiratory he has weaned down fio2 on NC. BNP decreasing.  He has been seen / examined by the ICU attending physician and is deemed stable for transfer to telemetry service. Plan as below:    --transfer to telemetry, geriatric floor under medicine service; vitals per policy  --continue current po bp regimen + daily lasix, monitor pt response / tolerance  --continue to wean fio2 via NC as tolerated; nebs prn  --complete 5 day course of tamiflu  --continue AC and asa  --monitor i/o's  --labs, monitor renal function; replace lytes as needed  --PO diet, assess tolerance; monitor glucose levels  --[facility]  no limitations  --pt / family education

## 2022-12-21 NOTE — PROGRESS NOTE ADULT - SUBJECTIVE AND OBJECTIVE BOX
HPI:  88M PMH atrial fibrillation on eliquis, history of CVA, Hypertension presents for SOB.   Patient conversant is a poor historian.  Unable to provide much information relevant.  Initially seen after being titrated off BIPAP and feeling okay however while waiting in ER patient with increasing BP and suspected of going back into flash pulmonary edema secondary to uncontrolled hypertension and placed back on BIPAP and given additional dose of lasix and labetalol.      Discussed with ICU Pa who agreed to take patient to CCU. (17 Dec 2022 11:26)      24 hr events: No active issues overnight, bp remained stable, no acute respiratory events, Fio2 via NC continuing to wean down. ROS otherwise negative.       ## ROS: See above.      ## Labs:  CBC:                        12.9   14.03 )-----------( 168      ( 21 Dec 2022 05:45 )             40.1     Chem:  12-21    142  |  102  |  62<H>  ----------------------------<  112<H>  3.5   |  29  |  1.45<H>    Ca    8.7      21 Dec 2022 05:45  Phos  3.6     12-21  Mg     2.2     12-21    TPro  6.8  /  Alb  2.7<L>  /  TBili  0.5  /  DBili  x   /  AST  26  /  ALT  13  /  AlkPhos  55  12-20          ## Medications:  oseltamivir 30 milliGRAM(s) Oral every 12 hours  amLODIPine   Tablet 10 milliGRAM(s) Oral daily  furosemide   Injectable 40 milliGRAM(s) IV Push daily  hydrALAZINE 25 milliGRAM(s) Oral every 8 hours  hydrALAZINE Injectable 10 milliGRAM(s) IV Push every 2 hours PRN  losartan 100 milliGRAM(s) Oral daily  metoprolol succinate ER 50 milliGRAM(s) Oral daily  albuterol/ipratropium for Nebulization 3 milliLiter(s) Nebulizer every 6 hours  guaiFENesin ER 1200 milliGRAM(s) Oral every 12 hours  predniSONE   Tablet 40 milliGRAM(s) Oral daily  apixaban 5 milliGRAM(s) Oral two times a day  aspirin enteric coated 81 milliGRAM(s) Oral daily  pantoprazole  Injectable 40 milliGRAM(s) IV Push two times a day  senna 2 Tablet(s) Oral at bedtime        ## Vitals:  T(C): 36.5 (12-21-22 @ 05:00), Max: 36.7 (12-20-22 @ 17:00)  HR: 70 (12-21-22 @ 09:00) (60 - 95)  BP: 135/66 (12-21-22 @ 09:00) (100/52 - 162/128)  BP(mean): 87 (12-21-22 @ 09:00) (68 - 142)  RR: 19 (12-21-22 @ 09:00) (14 - 28)  SpO2: 97% (12-21-22 @ 09:00) (94% - 100%)        12-20 @ 07:01  -  12-21 @ 07:00  --------------------------------------------------------  IN: 360 mL / OUT: 600 mL / NET: -240 mL        ## P/E:  Gen: Sitting up out of bed in chair, not in distress, non toxic appearing, normal WOB noted  HEENT: PERRL, EOMI  Resp: CTA B/L no c/r/w  CVS: S1S2  noted  Abd: soft NT/ND +BS  Ext: no c/c/e  Neuro: A&Ox3        CODE STATUS:  full code

## 2022-12-21 NOTE — PROGRESS NOTE ADULT - ASSESSMENT
Assessment:  Jaylon Antony is an 88 year old man with past medical history of Atrial fibrillation (on Eliquis), HFrEF (LVEF 25-30% in 2019), Hypertension and history of CVA presents with progressive dyspnea found to have acute hypoxic respiratory failure likely multifactorial from Influenza A and acute on chronic systolic heart failure, also with hypertensive emergency.    ECG consistent with atrial fibrillation with RVR. Troponins elevated and have peaked and is likely demand ischemia from CHF, hypertensive emergency and acute renal injury. Echo report consistent with LVEF 45-50%, moderate mitral regurgitation, mild to  moderate tricuspid regurgitation. Prior echo report from 2019 was consistent with LVEF 25-30%. Pro BNP elevated.     Recommendations:  [] NSTEMI: Likely Type II, continue medical management: Aspirin 81 mg daily, Metoprolol succinate 50 mg daily.  [] Acute on chronic systolic heart failure w/ HTN emergency: EF 45-50% which is higher than prior report from 2019 with LVEF 25-30% at that time. Continue IV lasix. Continue Hydralazine, BP stable. Patient receiving ARB, monitor Cr.  [] Atrial fibrillation: Rate controlled, continue beta blocker. Continue Eliquis for stroke prophylaxis, noted to be guaic positive- consider further evaluation per ICU team  [] Influenza A: Treatment per ICU team, patient receiving Oseltamivir     Patient follows with cardiologist, Dr. Rubio. Discussed with Dr. Hernandez. Will sign out to cardiologist to follow along tomorrow.    Kevin Hong MD  Cardiology

## 2022-12-21 NOTE — PROGRESS NOTE ADULT - SUBJECTIVE AND OBJECTIVE BOX
RAD [place]LE[place]  [id number]        Chief Complaint: Influenza A/Hypertensive emergency/CHF    Interval History: The patient has been transitioned from high flow nasal cannula to nasal cannula, still has coughing.     Tele: atrial fibrillation 80s BPM, PVCs    Current meds:   albuterol/ipratropium for Nebulization 3 milliLiter(s) Nebulizer every 6 hours  amLODIPine   Tablet 10 milliGRAM(s) Oral daily  apixaban 5 milliGRAM(s) Oral two times a day  aspirin enteric coated 81 milliGRAM(s) Oral daily  chlorhexidine 2% Cloths 1 Application(s) Topical <User Schedule>  furosemide   Injectable 40 milliGRAM(s) IV Push daily  guaiFENesin ER 1200 milliGRAM(s) Oral every 12 hours  hydrALAZINE 25 milliGRAM(s) Oral every 8 hours  hydrALAZINE Injectable 10 milliGRAM(s) IV Push every 2 hours PRN  losartan 100 milliGRAM(s) Oral daily  metoprolol succinate ER 50 milliGRAM(s) Oral daily  oseltamivir 30 milliGRAM(s) Oral every 12 hours  pantoprazole  Injectable 40 milliGRAM(s) IV Push two times a day  potassium chloride    Tablet ER 40 milliEquivalent(s) Oral once  predniSONE   Tablet 40 milliGRAM(s) Oral daily  senna 2 Tablet(s) Oral at bedtime      Objective:     Vital Signs:   T(C): 36.5 (12-21-22 @ 05:00), Max: 36.7 (12-20-22 @ 17:00)  HR: 70 (12-21-22 @ 09:00) (60 - 95)  BP: 135/66 (12-21-22 @ 09:00) (100/52 - 162/128)  RR: 19 (12-21-22 @ 09:00) (14 - 28)  SpO2: 97% (12-21-22 @ 09:00) (93% - 100%)  Wt(kg): --      Physical Exam:   General: elderly man on nasal cannula  Neck: supple   CVS: JVP ~9 cm H20, irregularly irregular, s1, s2  Pulm: labored respirations, coarse breath sounds  Ext: no lower extremity edema b/l  Neuro: A&O x3  Psych: Normal affect      Labs:   21 Dec 2022 05:45    142    |  102    |  62     ----------------------------<  112    3.5     |  29     |  1.45     Ca    8.7        21 Dec 2022 05:45  Phos  3.6       21 Dec 2022 05:45  Mg     2.2       21 Dec 2022 05:45    TPro  6.8    /  Alb  2.7    /  TBili  0.5    /  DBili  x      /  AST  26     /  ALT  13     /  AlkPhos  55     20 Dec 2022 05:50                          12.9   14.03 )-----------( 168      ( 21 Dec 2022 05:45 )             40.1         ECG (1217/22): atrial fibrillation with RVR    TTE (12/17/22):   1. Biatrial enlargement   2. Left ventricular ejection fraction, by visual estimation, is 45 to 50%.   3. Mildly decreased global left ventricular systolic function.   4. There is mild concentric left ventricular hypertrophy.   5. Moderate mitral valve regurgitation.   6. Moderate thickening and calcification of the anterior and posterior mitral valve leaflets.   7. Mild-moderate tricuspid regurgitation.   8. Mild pulmonic valve regurgitation.   9. Apical septal segment, apex, and basal and mid inferior septum are abnormal as described above.

## 2022-12-21 NOTE — PROGRESS NOTE ADULT - SUBJECTIVE AND OBJECTIVE BOX
Progress: clinically improved    Present Symptoms:   Dyspnea: resolved  Nausea/Vomiting: n  Anxiety: n   Depressed Mood: n  Fatigue: n  Loss of appetite: n  Pain:    deneis               location:   Review of Systems: All others negative   MEDICATIONS  (STANDING):  albuterol/ipratropium for Nebulization 3 milliLiter(s) Nebulizer every 6 hours  amLODIPine   Tablet 10 milliGRAM(s) Oral daily  apixaban 5 milliGRAM(s) Oral two times a day  aspirin enteric coated 81 milliGRAM(s) Oral daily  chlorhexidine 2% Cloths 1 Application(s) Topical <User Schedule>  furosemide   Injectable 40 milliGRAM(s) IV Push daily  guaiFENesin ER 1200 milliGRAM(s) Oral every 12 hours  hydrALAZINE 25 milliGRAM(s) Oral every 8 hours  losartan 100 milliGRAM(s) Oral daily  metoprolol succinate ER 50 milliGRAM(s) Oral daily  oseltamivir 30 milliGRAM(s) Oral every 12 hours  pantoprazole  Injectable 40 milliGRAM(s) IV Push two times a day  predniSONE   Tablet 40 milliGRAM(s) Oral daily  senna 2 Tablet(s) Oral at bedtime    MEDICATIONS  (PRN):  hydrALAZINE Injectable 10 milliGRAM(s) IV Push every 2 hours PRN sbp >17mm/hg      PHYSICAL EXAM:  Vital Signs Last 24 Hrs  T(C): 36.5 (21 Dec 2022 05:00), Max: 36.7 (20 Dec 2022 17:00)  T(F): 97.7 (21 Dec 2022 05:00), Max: 98.1 (20 Dec 2022 17:00)  HR: 94 (21 Dec 2022 15:12) (51 - 95)  BP: 103/63 (21 Dec 2022 13:00) (102/76 - 162/128)  BP(mean): 74 (21 Dec 2022 13:00) (73 - 139)  RR: 13 (21 Dec 2022 13:00) (13 - 28)  SpO2: 97% (21 Dec 2022 15:12) (93% - 100%)    Parameters below as of 21 Dec 2022 15:12  Patient On (Oxygen Delivery Method): nasal cannula      General: alert  oriented x _3___      HEENT: normal    Lungs: comfortable   CV: normal    GI: normal    : normal    Musculoskeletal: normal   Skin: normal    Neuro: no deficits   Oral intake ability:  oral feeding  Diet: NPO,     LABS:                          12.9   14.03 )-----------( 168      ( 21 Dec 2022 05:45 )             40.1     12-21    142  |  102  |  62<H>  ----------------------------<  112<H>  3.5   |  29  |  1.45<H>    Ca    8.7      21 Dec 2022 05:45  Phos  3.6     12-21  Mg     2.2     12-21    TPro  6.8  /  Alb  2.7<L>  /  TBili  0.5  /  DBili  x   /  AST  26  /  ALT  13  /  AlkPhos  55  12-20        RADIOLOGY & ADDITIONAL STUDIES:    ADVANCE DIRECTIVES:  Advanced Care Planning discussion total time spent:

## 2022-12-21 NOTE — PROGRESS NOTE ADULT - ASSESSMENT
88 year old male with a PMH of CVA, CHF, HTN, afib on eliquis, hip fx s/p fall and prior intubation for HF exacerbation presenting to [facility] ED for SOB and CP. Pt noted to be hypoxemic with elevated sbp to 200's. Pt tx with NIV, lasix and nitro with +response. Pt admitted to ICU for management of:    Influenza A  HF exacerbation with pulmonary edema  HTN urgency / emergency  ?copd component  afib      Pt has since improved and remains stable neurologically, with stable blood pressures. Respiratory he has weaned down fio2 on NC. BNP decreasing.  He has been seen / examined by the ICU attending physician and is deemed stable for transfer to telemetry service. Plan as below:    --transfer to telemetry, geriatric floor under medicine service; vitals per policy  --continue current po bp regimen + daily lasix, monitor pt response / tolerance  --continue to wean fio2 via NC as tolerated; nebs prn  --complete 5 day course of tamiflu  --continue AC and asa  --monitor i/o's  --labs, monitor renal function; replace lytes as needed  --PO diet, assess tolerance; monitor glucose levels  --GOC  --pt / family education

## 2022-12-22 LAB
ANION GAP SERPL CALC-SCNC: 10 MMOL/L — SIGNIFICANT CHANGE UP (ref 5–17)
BUN SERPL-MCNC: 66 MG/DL — HIGH (ref 7–23)
CALCIUM SERPL-MCNC: 8.8 MG/DL — SIGNIFICANT CHANGE UP (ref 8.4–10.5)
CHLORIDE SERPL-SCNC: 98 MMOL/L — SIGNIFICANT CHANGE UP (ref 96–108)
CO2 SERPL-SCNC: 27 MMOL/L — SIGNIFICANT CHANGE UP (ref 22–31)
CREAT SERPL-MCNC: 1.74 MG/DL — HIGH (ref 0.5–1.3)
EGFR: 37 ML/MIN/1.73M2 — LOW
GLUCOSE SERPL-MCNC: 117 MG/DL — HIGH (ref 70–99)
HCT VFR BLD CALC: 39.2 % — SIGNIFICANT CHANGE UP (ref 39–50)
HGB BLD-MCNC: 12.6 G/DL — LOW (ref 13–17)
MAGNESIUM SERPL-MCNC: 2.3 MG/DL — SIGNIFICANT CHANGE UP (ref 1.6–2.6)
MCHC RBC-ENTMCNC: 32.1 GM/DL — SIGNIFICANT CHANGE UP (ref 32–36)
MCHC RBC-ENTMCNC: 32.1 PG — SIGNIFICANT CHANGE UP (ref 27–34)
MCV RBC AUTO: 99.7 FL — SIGNIFICANT CHANGE UP (ref 80–100)
NRBC # BLD: 0 /100 WBCS — SIGNIFICANT CHANGE UP (ref 0–0)
PHOSPHATE SERPL-MCNC: 3.8 MG/DL — SIGNIFICANT CHANGE UP (ref 2.5–4.5)
PLATELET # BLD AUTO: 161 K/UL — SIGNIFICANT CHANGE UP (ref 150–400)
POTASSIUM SERPL-MCNC: 3.8 MMOL/L — SIGNIFICANT CHANGE UP (ref 3.5–5.3)
POTASSIUM SERPL-SCNC: 3.8 MMOL/L — SIGNIFICANT CHANGE UP (ref 3.5–5.3)
RBC # BLD: 3.93 M/UL — LOW (ref 4.2–5.8)
RBC # FLD: 13.5 % — SIGNIFICANT CHANGE UP (ref 10.3–14.5)
SODIUM SERPL-SCNC: 135 MMOL/L — SIGNIFICANT CHANGE UP (ref 135–145)
WBC # BLD: 13.06 K/UL — HIGH (ref 3.8–10.5)
WBC # FLD AUTO: 13.06 K/UL — HIGH (ref 3.8–10.5)

## 2022-12-22 PROCEDURE — 99233 SBSQ HOSP IP/OBS HIGH 50: CPT | Mod: FS

## 2022-12-22 RX ORDER — BUDESONIDE AND FORMOTEROL FUMARATE DIHYDRATE 160; 4.5 UG/1; UG/1
2 AEROSOL RESPIRATORY (INHALATION)
Refills: 0 | Status: DISCONTINUED | OUTPATIENT
Start: 2022-12-22 | End: 2022-12-26

## 2022-12-22 RX ORDER — APIXABAN 2.5 MG/1
2.5 TABLET, FILM COATED ORAL EVERY 12 HOURS
Refills: 0 | Status: DISCONTINUED | OUTPATIENT
Start: 2022-12-22 | End: 2023-01-03

## 2022-12-22 RX ADMIN — APIXABAN 2.5 MILLIGRAM(S): 2.5 TABLET, FILM COATED ORAL at 18:26

## 2022-12-22 RX ADMIN — SENNA PLUS 2 TABLET(S): 8.6 TABLET ORAL at 22:50

## 2022-12-22 RX ADMIN — LOSARTAN POTASSIUM 100 MILLIGRAM(S): 100 TABLET, FILM COATED ORAL at 05:24

## 2022-12-22 RX ADMIN — Medication 81 MILLIGRAM(S): at 12:09

## 2022-12-22 RX ADMIN — Medication 40 MILLIGRAM(S): at 05:24

## 2022-12-22 RX ADMIN — Medication 40 MILLIGRAM(S): at 06:22

## 2022-12-22 RX ADMIN — Medication 30 MILLIGRAM(S): at 10:45

## 2022-12-22 RX ADMIN — Medication 25 MILLIGRAM(S): at 22:53

## 2022-12-22 RX ADMIN — Medication 3 MILLILITER(S): at 09:27

## 2022-12-22 RX ADMIN — APIXABAN 5 MILLIGRAM(S): 2.5 TABLET, FILM COATED ORAL at 10:45

## 2022-12-22 RX ADMIN — AMLODIPINE BESYLATE 10 MILLIGRAM(S): 2.5 TABLET ORAL at 05:24

## 2022-12-22 RX ADMIN — PANTOPRAZOLE SODIUM 40 MILLIGRAM(S): 20 TABLET, DELAYED RELEASE ORAL at 18:25

## 2022-12-22 RX ADMIN — Medication 25 MILLIGRAM(S): at 05:24

## 2022-12-22 RX ADMIN — PANTOPRAZOLE SODIUM 40 MILLIGRAM(S): 20 TABLET, DELAYED RELEASE ORAL at 05:24

## 2022-12-22 RX ADMIN — Medication 3 MILLILITER(S): at 04:56

## 2022-12-22 RX ADMIN — Medication 100 MILLIGRAM(S): at 23:45

## 2022-12-22 RX ADMIN — Medication 50 MILLIGRAM(S): at 05:24

## 2022-12-22 RX ADMIN — Medication 25 MILLIGRAM(S): at 13:48

## 2022-12-22 NOTE — PHYSICAL THERAPY INITIAL EVALUATION ADULT - PERTINENT HX OF CURRENT PROBLEM, REHAB EVAL
88y Male admitted with  HTN urgency, flash pulm. edema/CHF as well as Influenza A (+), required initation of BiPAP and nitro gtt

## 2022-12-22 NOTE — PROGRESS NOTE ADULT - SUBJECTIVE AND OBJECTIVE BOX
Follow-up Pulmonary Progress Note  Chief Complaint : Heart failure    patient seen and examined  comfortable   on 3 L n/c   sat 92%  cough and wheezing continues but feels better      Allergies :No Known Allergies  PAST MEDICAL & SURGICAL HISTORY:  Afib  Congestive heart failure (CHF)  CVA (cerebral vascular accident)  Hypertension, unspecified type  No significant past surgical history      Medications:  MEDICATIONS  (STANDING):  albuterol/ipratropium for Nebulization 3 milliLiter(s) Nebulizer every 6 hours  amLODIPine   Tablet 10 milliGRAM(s) Oral daily  apixaban 5 milliGRAM(s) Oral two times a day  aspirin enteric coated 81 milliGRAM(s) Oral daily  chlorhexidine 2% Cloths 1 Application(s) Topical <User Schedule>  hydrALAZINE 25 milliGRAM(s) Oral every 8 hours  losartan 100 milliGRAM(s) Oral daily  metoprolol succinate ER 50 milliGRAM(s) Oral daily  oseltamivir 30 milliGRAM(s) Oral every 12 hours  pantoprazole  Injectable 40 milliGRAM(s) IV Push two times a day  predniSONE   Tablet 40 milliGRAM(s) Oral daily  senna 2 Tablet(s) Oral at bedtime    MEDICATIONS  (PRN):  hydrALAZINE Injectable 10 milliGRAM(s) IV Push every 2 hours PRN sbp >17mm/hg      Antibiotics History  oseltamivir 75 milliGRAM(s) Oral once, 12-18-22 @ 20:55, Stop order after: 1 Doses  oseltamivir 30 milliGRAM(s) Oral every 12 hours, 12-19-22 @ 09:00, Stop order after: 4 Days      Heme Medications   apixaban 5 milliGRAM(s) Oral two times a day, 12-17-22 @ 11:33  aspirin enteric coated 81 milliGRAM(s) Oral daily, 12-17-22 @ 11:33      GI Medications  pantoprazole  Injectable 40 milliGRAM(s) IV Push two times a day, 12-19-22 @ 21:05, Routine  senna 2 Tablet(s) Oral at bedtime, 12-17-22 @ 11:34, Routine        LABS:                        12.6   13.06 )-----------( 161      ( 22 Dec 2022 06:57 )             39.2     12-22    135  |  98  |  66<H>  ----------------------------<  117<H>  3.8   |  27  |  1.74<H>    Ca    8.8      22 Dec 2022 06:57  Phos  3.8     12-22  Mg     2.3     12-22          VITALS:  T(C): 36.6 (12-22-22 @ 05:16), Max: 36.6 (12-21-22 @ 16:00)  T(F): 97.9 (12-22-22 @ 05:16), Max: 97.9 (12-22-22 @ 05:16)  HR: 95 (12-22-22 @ 09:30) (56 - 95)  BP: 149/72 (12-22-22 @ 05:16) (113/52 - 159/70)  BP(mean): 124 (12-21-22 @ 18:00) (70 - 124)  ABP: --  ABP(mean): --  RR: 16 (12-22-22 @ 05:16) (14 - 26)  SpO2: 98% (12-22-22 @ 09:30) (95% - 98%)  CVP(mm Hg): --  CVP(cm H2O): --    Ins and Outs     12-21-22 @ 07:01  -  12-22-22 @ 07:00  --------------------------------------------------------  IN: 0 mL / OUT: 600 mL / NET: -600 mL        Height (cm): 170.2 (12-21-22 @ 20:51)        I&O's Detail    21 Dec 2022 07:01  -  22 Dec 2022 07:00  --------------------------------------------------------  IN:  Total IN: 0 mL    OUT:    Voided (mL): 600 mL  Total OUT: 600 mL    Total NET: -600 mL

## 2022-12-22 NOTE — PROGRESS NOTE ADULT - SUBJECTIVE AND OBJECTIVE BOX
Patient is a 88y old  Male who presents with a chief complaint of shortness of breath (22 Dec 2022 13:31)      Patient seen and examined at bedside. Pt states they feel well, denies overnight events or current complaints including chest pain, shortness of breath, dizziness, nausea, vomiting, diarrhea, fever or chills.      ALLERGIES:  No Known Allergies    MEDICATIONS  (STANDING):  albuterol/ipratropium for Nebulization 3 milliLiter(s) Nebulizer every 6 hours  amLODIPine   Tablet 10 milliGRAM(s) Oral daily  apixaban 5 milliGRAM(s) Oral two times a day  aspirin enteric coated 81 milliGRAM(s) Oral daily  budesonide  80 MICROgram(s)/formoterol 4.5 MICROgram(s) Inhaler 2 Puff(s) Inhalation two times a day  chlorhexidine 2% Cloths 1 Application(s) Topical <User Schedule>  hydrALAZINE 25 milliGRAM(s) Oral every 8 hours  metoprolol succinate ER 50 milliGRAM(s) Oral daily  oseltamivir 30 milliGRAM(s) Oral every 12 hours  pantoprazole  Injectable 40 milliGRAM(s) IV Push two times a day  predniSONE   Tablet 40 milliGRAM(s) Oral daily  senna 2 Tablet(s) Oral at bedtime    MEDICATIONS  (PRN):  hydrALAZINE Injectable 10 milliGRAM(s) IV Push every 2 hours PRN sbp >17mm/hg    Vital Signs Last 24 Hrs  T(F): 97.9 (22 Dec 2022 05:16), Max: 97.9 (22 Dec 2022 05:16)  HR: 95 (22 Dec 2022 09:30) (56 - 95)  BP: 149/72 (22 Dec 2022 05:16) (113/52 - 159/70)  RR: 16 (22 Dec 2022 05:16) (14 - 26)  SpO2: 98% (22 Dec 2022 09:30) (95% - 98%)  I&O's Summary    21 Dec 2022 07:01  -  22 Dec 2022 07:00  --------------------------------------------------------  IN: 0 mL / OUT: 600 mL / NET: -600 mL      PHYSICAL EXAM:  General: NAD, A/O x 3  ENT: MMM, no oral thrush   Neck: Supple, No JVD  Lungs: wheezing/rhonchi bilaterally, non labored breathing  Cardio: RRR, S1/S2, No murmurs  Abdomen: Soft, Nontender, Nondistended; Bowel sounds present  Extremities: No calf tenderness, Trace pitting edema    LABS:                        12.6   13.06 )-----------( 161      ( 22 Dec 2022 06:57 )             39.2     12-22    135  |  98  |  66  ----------------------------<  117  3.8   |  27  |  1.74    Ca    8.8      22 Dec 2022 06:57  Phos  3.8     12-22  Mg     2.3     12-22    TPro  6.8  /  Alb  2.7  /  TBili  0.5  /  DBili  x   /  AST  26  /  ALT  13  /  AlkPhos  55  12-20          CARDIAC MARKERS ( 21 Dec 2022 05:45 )  x     / 79.6 ng/L / x     / x     / x                                    RADIOLOGY & ADDITIONAL TESTS:    Care Discussed with Consultants/Other Providers:   Pulmonary Dr. Hernandez  Patient is a 88y old  Male who presents with a chief complaint of shortness of breath (22 Dec 2022 13:31)      Patient seen and examined at bedside. Pt states they feel well, denies overnight events or current complaints including chest pain, shortness of breath, dizziness, nausea, vomiting, diarrhea, fever or chills.      ALLERGIES:  No Known Allergies    MEDICATIONS  (STANDING):  albuterol/ipratropium for Nebulization 3 milliLiter(s) Nebulizer every 6 hours  amLODIPine   Tablet 10 milliGRAM(s) Oral daily  apixaban 5 milliGRAM(s) Oral two times a day  aspirin enteric coated 81 milliGRAM(s) Oral daily  budesonide  80 MICROgram(s)/formoterol 4.5 MICROgram(s) Inhaler 2 Puff(s) Inhalation two times a day  chlorhexidine 2% Cloths 1 Application(s) Topical <User Schedule>  hydrALAZINE 25 milliGRAM(s) Oral every 8 hours  metoprolol succinate ER 50 milliGRAM(s) Oral daily  oseltamivir 30 milliGRAM(s) Oral every 12 hours  pantoprazole  Injectable 40 milliGRAM(s) IV Push two times a day  predniSONE   Tablet 40 milliGRAM(s) Oral daily  senna 2 Tablet(s) Oral at bedtime    MEDICATIONS  (PRN):  hydrALAZINE Injectable 10 milliGRAM(s) IV Push every 2 hours PRN sbp >17mm/hg    Vital Signs Last 24 Hrs  T(F): 97.9 (22 Dec 2022 05:16), Max: 97.9 (22 Dec 2022 05:16)  HR: 95 (22 Dec 2022 09:30) (56 - 95)  BP: 149/72 (22 Dec 2022 05:16) (113/52 - 159/70)  RR: 16 (22 Dec 2022 05:16) (14 - 26)  SpO2: 98% (22 Dec 2022 09:30) (95% - 98%)  I&O's Summary    21 Dec 2022 07:01  -  22 Dec 2022 07:00  --------------------------------------------------------  IN: 0 mL / OUT: 600 mL / NET: -600 mL      PHYSICAL EXAM:  General: NAD, A/O  ENT: MMM, no oral thrush   Neck: Supple, No JVD  Lungs: wheezing/rhonchi bilaterally, non labored breathing  Cardio: RRR, S1/S2, No murmurs  Abdomen: Soft, Nontender, Nondistended; Bowel sounds present  Extremities: No calf tenderness, Trace pitting edema    LABS:                        12.6   13.06 )-----------( 161      ( 22 Dec 2022 06:57 )             39.2     12-22    135  |  98  |  66  ----------------------------<  117  3.8   |  27  |  1.74    Ca    8.8      22 Dec 2022 06:57  Phos  3.8     12-22  Mg     2.3     12-22    TPro  6.8  /  Alb  2.7  /  TBili  0.5  /  DBili  x   /  AST  26  /  ALT  13  /  AlkPhos  55  12-20          CARDIAC MARKERS ( 21 Dec 2022 05:45 )  x     / 79.6 ng/L / x     / x     / x          RADIOLOGY & ADDITIONAL TESTS:  < from: Xray Chest 1 View- PORTABLE-Routine (Xray Chest 1 View- PORTABLE-Routine in AM.) (12.21.22 @ 06:25) >  IMPRESSION: Increasing rather consolidated right midlung field infiltrate   at this time. Other findings stable.    < end of copied text >    Dr. Martinez personal read: RML infiltrate, LLL pleural effusion    Care Discussed with Consultants/Other Providers:   Pulmonary Dr. Hernandez

## 2022-12-22 NOTE — PROGRESS NOTE ADULT - ASSESSMENT
88 year old male with a PMH of CVA, CHF, HTN, afib on eliquis, hip fx s/p fall and prior intubation for HF exacerbation presenting to [facility] ED for SOB and CP. Pt noted to be hypoxemic with elevated sbp to 200's. Pt tx with NIV, lasix and nitro with +response. Pt downgraded from ICU.     Influenza A  acute on chronic HF exacerbation with pulmonary edema  HTN urgency / emergency  ?copd component  Chronic afib  IRINEO on CKD III      --continue current po bp regimen  --add Hydralazine IV for uncontrolled HTN  --continue to wean fio2 via NC as tolerated; nebs prn  -- course of tamiflu  --Lasix DC'd. due to worsening renal status   --continue AC and asa  --monitor i/o's  --labs, monitor renal function; replace lytes as needed  --PO diet, assess tolerance; monitor glucose levels  --GOC  --pt / family education    DVT PPX  -Eliquis adjusted to 2.5mg BID due to worsening renal status     Pt prefers to update family individually    Dispo: Pending clinical course  88 year old male with a PMH of CVA, CHF, HTN, afib on eliquis, hip fx s/p fall and prior intubation for HF exacerbation presenting to [facility] ED for SOB and CP. Pt noted to be hypoxemic with elevated sbp to 200's. Pt tx with NIV, lasix and nitro with +response. Pt downgraded from ICU.         Influenza A  acute on chronic combined HF exacerbation with pulmonary edema  --continue to wean fio2 via NC as tolerated; nebs prn  -- course of tamiflu  --Lasix DC'd. due to worsening renal status   --Symbicort  --Duonebs  --Steroid taper  --Chest x ray   --monitor i/o's  --labs, monitor renal function; replace lytes as needed  --PO diet, assess tolerance; monitor glucose levels    HTN urgency / emergency  --continue current po bp regimen  --add Hydralazine IV for uncontrolled     IRINEO on CKD III  -Lasix DC'd  -avoid nephrotoxins  -hold ACE/ARB  -monitor bmp  -adjust meds as needed    Chronic afib  --continue AC and asa    hx of CVA  --asa    DVT PPX  -Eliquis adjusted to 2.5mg BID due to worsening renal status     Pt prefers to update family individually  Full Code  Palliative recs    Dispo: Pending clinical course  88 year old male with a PMH of CVA, CHF, HTN, afib on eliquis, hip fx s/p fall and prior intubation for HF exacerbation presenting to [facility] ED for SOB and CP. Pt noted to be hypoxemic with elevated sbp to 200's. Pt tx with NIV, lasix and nitro with +response. Pt downgraded from ICU.     Influenza A  acute on chronic combined HF exacerbation with pulmonary edema  -continue to wean fio2 via NC as tolerated; nebs prn  - course of tamiflu x5 days, today is the last day.   -Lasix DC'd. due to worsening renal status   -Symbicort  -Duonebs  --Steroid taper  --Chest x ray   --monitor i/o's  --labs, monitor renal function; replace lytes as needed  --PO diet, assess tolerance; monitor glucose levels    HTN urgency / emergency  --continue current po bp regimen  --add Hydralazine IV for uncontrolled     IRINEO on CKD III  -Lasix DC'd  -avoid nephrotoxins  -hold ACE/ARB  -monitor bmp  -adjust meds as needed    Chronic afib  --continue AC and asa    hx of CVA  --asa    DVT PPX  -Eliquis adjusted to 2.5mg BID due to worsening renal status     Pt prefers to update family individually  Full Code  Palliative recs    Dispo: Pending clinical course  88 year old male with a PMH of CVA, CHF, HTN, afib on eliquis, hip fx s/p fall and prior intubation for HF exacerbation presenting to [facility] ED for SOB and CP. Pt noted to be hypoxemic with elevated sbp to 200's. Pt tx with NIV, lasix and nitro with +response. Pt downgraded from ICU.     Influenza A  -continue to wean fio2 via NC as tolerated; nebs prn  - course of tamiflu x5 days, today is the last day.   -Symbicort  -Duonebs  -Steroid taper - will start 30 mg tomorrow  -PO diet, assess tolerance; monitor glucose levels    Acute on chronic systolic HF exacerbation with HTN emergency  Pulmonary edema  -Lasix DC'd. due to worsening renal status   - hold ARB due to IRINEO.   -add Hydralazine IV for uncontrolled   -Chest x ray in AM  -monitor i/o's  -labs, monitor renal function; replace lytes as needed    IRINEO on CKD III  -Cr 1.45--> 1.75  -Lasix DC'd  -avoid nephrotoxins  -hold ACE/ARB  -monitor bmp  -renally adjust meds as needed    type II demand ischemia   Cardiology was consulted pt is on aspirin 81 mg qd and toprol    Chronic afib  -continue AC and asa    hx of CVA  -asa    DVT PPX  -Eliquis adjusted to 2.5mg BID due to worsening renal status     Pt prefers to update family individually  Full Code  Palliative recs    Dispo: Pending clinical course  88 year old male with a PMH of CVA, CHF, HTN, afib on eliquis, hip fx s/p fall and prior intubation for HF exacerbation presenting to [facility] ED for SOB and CP. Pt noted to be hypoxemic with elevated sbp to 200's. Pt tx with NIV, lasix and nitro with +response. Pt downgraded from ICU.     Acute hypoxic respiratory failure 2/2 Influenza A  -continue to wean fio2 via NC as tolerated; nebs prn  - course of tamiflu x5 days, today is the last day.   -Symbicort  -Duonebs  -Steroid taper - will start 30 mg tomorrow  -PO diet, assess tolerance; monitor glucose levels    Acute on chronic systolic HF exacerbation with HTN emergency  Pulmonary edema  -Lasix DC'd. due to worsening renal status   - hold ARB due to IRINEO.   -add Hydralazine IV for uncontrolled   -Chest x ray in AM  -monitor i/o's  -labs, monitor renal function; replace lytes as needed    IRINEO on CKD III  -Cr 1.45--> 1.75  -Lasix DC'd  -avoid nephrotoxins  -hold ACE/ARB  -monitor bmp  -renally adjust meds as needed    type II demand ischemia   Cardiology was consulted pt is on aspirin 81 mg qd and toprol    Chronic afib  -continue AC and asa    hx of CVA  -asa    DVT PPX  -Eliquis adjusted to 2.5mg BID due to worsening renal status     Pt prefers to update family individually  Full Code  Palliative recs    Dispo: Pending clinical course

## 2022-12-22 NOTE — PHYSICAL THERAPY INITIAL EVALUATION ADULT - ADDITIONAL COMMENTS
pt lives with children in private home. no steps to enter home no steps inside. pt was independent with ADl's and amb c rw

## 2022-12-22 NOTE — PHYSICAL THERAPY INITIAL EVALUATION ADULT - SITTING BALANCE: STATIC
[FreeTextEntry1] : 58 year-old gentleman with known cardiovascular risk factors including hypertension, well controlled non-insulin dependent diabetes, dyslipidemia, obesity, CHAYITO, bipolar disorder on Lithium and Geodon, lives as a full time resident of the [facility]. He presents today in his usual state of health, having lost approximately 65 lbs over the past year. He had a repeat sleep study performed 8/12/2016 which showed severe CHAYITO.\par He was formerly followed by a cardiologist in [place], Geovanny Elizabeth MD.\par \par May 10, 2019: Patient presents with complain of sleep apnea. Has concerning sleep study two months ago. He has nasal congestion and cannot tolerate his CPAP. He is also concern about neuropathic pain in the insteps of his feet. He has lost several pounds through diet and exercise that are supervised through his program.\par \par Psychiatrist at [facility] in [place] who manages his Lithium and his antipsychotic medication (second generation, Geodon).\par \par November 2019 - Patient presents today in his usual state of health. He has modified his diet and is losing significant weight. From a peak of 349 lbs in 2012, patient is now 284 lbs. He reports having difficulty walking long distances, but that is because of leg weakness, not because of shortness of breath or chest pain. He is concerned about dyspnea on exertion, but he has not experienced this recently.\par \par PMD: Gwen Le MD [phone]\par Sleep Medicine: Angelika Todd MD [phone]\par Endocrinologist: Rocío Bañuelos MD [phone]
good balance

## 2022-12-22 NOTE — PROGRESS NOTE ADULT - ASSESSMENT
Physical Examination:  GENERAL:               Alert,  no acute distress.    HEENT:                    + JVD, Moist MM  PULM:                     Bilateral air entry, Clear to auscultation bilaterally, no significant sputum production, no Rales, No Rhonchi, ++ Wheezing  CVS:                         S1, S2,  +Murmur  ABD:                        Soft, nondistended, nontender, normoactive bowel sounds,   EXT:                         mild edema, nontender, No Cyanosis or Clubbing   NEURO:                  Alert, oriented, interactive, nonfocal, follows commands  PSYC:                      Calm, + Insight.        88 year old male with a PMH of CVA, CHF, HTN, afib on eliquis, hip fx s/p fall and prior intubation for HF exacerbation presenting to [facility] ED for SOB and CP. Pt noted to be hypoxemic with elevated sbp to 200's. Pt tx with NIV, lasix and nitro with +response. ICU team consulted. Influenza A + with mild elevated troponin, elevated bnp and ?IRINEO on labs. When we arrived pt was alert and oriented, with O2 sat 100%, rate controlled afib on monitor and sbp 140. Pt trial off bipap with NC ~4 liters without desaturation or respiratory distress. ABG obtained with the following results: 7.37 /  45 / 87 / 26 97% on 4 liters NC. Troponin mildly elevated and has stabilized with no major change and ekg is without acute ischemia noted currently appearing to be demand ischemia at this time (also he is on AC at home), cardiology contacted by ED MD. BNP elevated to 6k. Given pt appears comfortable and states that he is "feeling better" along with his neurological, respiratory (off NIV) and hemodynamic stability  he is not currently a candidate for ICU and is appropriate for monitoring on telemetry under medicine service. However, if any changes should occur please contact our team immediately. Thank you. Recommending the following:    Assessment  1. Influenza A  2. 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 Elevated trop, r/o NSTEMI  4. Underlying Afib on Eliquis, HTN, CVA,   5. Increasing Cr / IRINEO normal baseline    Plan   Hold Lasix/ARB at this time  monitor BP as worsening renal function  Repeat CXR in am  n/c o2 to maintain sat  Start symbicort  Oral diet  prednisone taper  continue  duoneb atc  ASA, A/c with eliquis  Cardio Follow up to optimize cardiac meds.     PMD:				                   Notified(Date):  Family Updated: 	DTR Lora 	                Date: 12/20/22   - updated on status     Sedation & Analgesia:	  Diet/Nutrition:		Diet, Minced and Moist:   Consistent Carbohydrate No Snacks  DASH/TLC Sodium & Cholesterol Restricted (12-18-22 @ 08:09) [Active]     GI PPx:			  senna 2 Tablet(s) Oral at bedtime     DVT Ppx:		  apixaban 5 milliGRAM(s) Oral two times a day  aspirin enteric coated 81 milliGRAM(s) Oral daily      Activity:		    Head of Bed:               35-45 Deg  Glycemic Control:              Lines: PIV    Restraints were deemed necessary to prevent removal of life-sustaining devices [  ] YES   [ x   ]  NO    Disposition: Possible transfer to medical floor     Goals of Care: Full code

## 2022-12-22 NOTE — PHYSICAL THERAPY INITIAL EVALUATION ADULT - NSACTIVITYREC_GEN_A_PT
pt amb s o2 during IE. pt desaturated to 87%. o2 replaced. pt currently requires o2 for amb with assist of one.

## 2022-12-23 LAB
ANION GAP SERPL CALC-SCNC: 10 MMOL/L — SIGNIFICANT CHANGE UP (ref 5–17)
ANION GAP SERPL CALC-SCNC: 8 MMOL/L — SIGNIFICANT CHANGE UP (ref 5–17)
BUN SERPL-MCNC: 72 MG/DL — HIGH (ref 7–23)
BUN SERPL-MCNC: 73 MG/DL — HIGH (ref 7–23)
CALCIUM SERPL-MCNC: 8.9 MG/DL — SIGNIFICANT CHANGE UP (ref 8.4–10.5)
CALCIUM SERPL-MCNC: 9 MG/DL — SIGNIFICANT CHANGE UP (ref 8.4–10.5)
CHLORIDE SERPL-SCNC: 101 MMOL/L — SIGNIFICANT CHANGE UP (ref 96–108)
CHLORIDE SERPL-SCNC: 103 MMOL/L — SIGNIFICANT CHANGE UP (ref 96–108)
CO2 SERPL-SCNC: 27 MMOL/L — SIGNIFICANT CHANGE UP (ref 22–31)
CO2 SERPL-SCNC: 29 MMOL/L — SIGNIFICANT CHANGE UP (ref 22–31)
CREAT SERPL-MCNC: 1.59 MG/DL — HIGH (ref 0.5–1.3)
CREAT SERPL-MCNC: 1.75 MG/DL — HIGH (ref 0.5–1.3)
EGFR: 37 ML/MIN/1.73M2 — LOW
EGFR: 42 ML/MIN/1.73M2 — LOW
GLUCOSE SERPL-MCNC: 138 MG/DL — HIGH (ref 70–99)
GLUCOSE SERPL-MCNC: 150 MG/DL — HIGH (ref 70–99)
HCT VFR BLD CALC: 38.7 % — LOW (ref 39–50)
HGB BLD-MCNC: 12.6 G/DL — LOW (ref 13–17)
MAGNESIUM SERPL-MCNC: 2.2 MG/DL — SIGNIFICANT CHANGE UP (ref 1.6–2.6)
MCHC RBC-ENTMCNC: 32.2 PG — SIGNIFICANT CHANGE UP (ref 27–34)
MCHC RBC-ENTMCNC: 32.6 GM/DL — SIGNIFICANT CHANGE UP (ref 32–36)
MCV RBC AUTO: 99 FL — SIGNIFICANT CHANGE UP (ref 80–100)
NRBC # BLD: 0 /100 WBCS — SIGNIFICANT CHANGE UP (ref 0–0)
NT-PROBNP SERPL-SCNC: 3533 PG/ML — HIGH (ref 0–300)
PHOSPHATE SERPL-MCNC: 2.5 MG/DL — SIGNIFICANT CHANGE UP (ref 2.5–4.5)
PLATELET # BLD AUTO: 200 K/UL — SIGNIFICANT CHANGE UP (ref 150–400)
POTASSIUM SERPL-MCNC: 3.6 MMOL/L — SIGNIFICANT CHANGE UP (ref 3.5–5.3)
POTASSIUM SERPL-MCNC: 4 MMOL/L — SIGNIFICANT CHANGE UP (ref 3.5–5.3)
POTASSIUM SERPL-SCNC: 3.6 MMOL/L — SIGNIFICANT CHANGE UP (ref 3.5–5.3)
POTASSIUM SERPL-SCNC: 4 MMOL/L — SIGNIFICANT CHANGE UP (ref 3.5–5.3)
PROCALCITONIN SERPL-MCNC: 0.25 NG/ML — HIGH
PROCALCITONIN SERPL-MCNC: 0.28 NG/ML — HIGH
RBC # BLD: 3.91 M/UL — LOW (ref 4.2–5.8)
RBC # FLD: 13.5 % — SIGNIFICANT CHANGE UP (ref 10.3–14.5)
SODIUM SERPL-SCNC: 138 MMOL/L — SIGNIFICANT CHANGE UP (ref 135–145)
SODIUM SERPL-SCNC: 140 MMOL/L — SIGNIFICANT CHANGE UP (ref 135–145)
WBC # BLD: 14.78 K/UL — HIGH (ref 3.8–10.5)
WBC # FLD AUTO: 14.78 K/UL — HIGH (ref 3.8–10.5)

## 2022-12-23 PROCEDURE — 99233 SBSQ HOSP IP/OBS HIGH 50: CPT | Mod: FS

## 2022-12-23 PROCEDURE — 71045 X-RAY EXAM CHEST 1 VIEW: CPT | Mod: 26

## 2022-12-23 RX ORDER — POTASSIUM CHLORIDE 20 MEQ
40 PACKET (EA) ORAL ONCE
Refills: 0 | Status: COMPLETED | OUTPATIENT
Start: 2022-12-23 | End: 2022-12-24

## 2022-12-23 RX ADMIN — Medication 81 MILLIGRAM(S): at 11:18

## 2022-12-23 RX ADMIN — SENNA PLUS 2 TABLET(S): 8.6 TABLET ORAL at 21:17

## 2022-12-23 RX ADMIN — Medication 600 MILLIGRAM(S): at 18:40

## 2022-12-23 RX ADMIN — Medication 25 MILLIGRAM(S): at 13:55

## 2022-12-23 RX ADMIN — Medication 100 MILLIGRAM(S): at 13:54

## 2022-12-23 RX ADMIN — APIXABAN 2.5 MILLIGRAM(S): 2.5 TABLET, FILM COATED ORAL at 18:38

## 2022-12-23 RX ADMIN — PANTOPRAZOLE SODIUM 40 MILLIGRAM(S): 20 TABLET, DELAYED RELEASE ORAL at 05:16

## 2022-12-23 RX ADMIN — Medication 25 MILLIGRAM(S): at 06:20

## 2022-12-23 RX ADMIN — Medication 25 MILLIGRAM(S): at 21:18

## 2022-12-23 RX ADMIN — Medication 200 MILLIGRAM(S): at 21:17

## 2022-12-23 RX ADMIN — Medication 3 MILLILITER(S): at 05:16

## 2022-12-23 RX ADMIN — APIXABAN 2.5 MILLIGRAM(S): 2.5 TABLET, FILM COATED ORAL at 05:16

## 2022-12-23 RX ADMIN — BUDESONIDE AND FORMOTEROL FUMARATE DIHYDRATE 2 PUFF(S): 160; 4.5 AEROSOL RESPIRATORY (INHALATION) at 20:39

## 2022-12-23 RX ADMIN — Medication 100 MILLIGRAM(S): at 06:20

## 2022-12-23 RX ADMIN — Medication 50 MILLIGRAM(S): at 05:16

## 2022-12-23 RX ADMIN — Medication 3 MILLILITER(S): at 09:16

## 2022-12-23 RX ADMIN — AMLODIPINE BESYLATE 10 MILLIGRAM(S): 2.5 TABLET ORAL at 05:16

## 2022-12-23 RX ADMIN — Medication 3 MILLILITER(S): at 15:27

## 2022-12-23 RX ADMIN — BUDESONIDE AND FORMOTEROL FUMARATE DIHYDRATE 2 PUFF(S): 160; 4.5 AEROSOL RESPIRATORY (INHALATION) at 09:18

## 2022-12-23 RX ADMIN — Medication 3 MILLILITER(S): at 20:37

## 2022-12-23 NOTE — PROGRESS NOTE ADULT - SUBJECTIVE AND OBJECTIVE BOX
Follow-up Pulmonary Progress Note  Chief Complaint : Heart failure    patient seen and examined  patient complaining of productive cough and unable to bring up seretions  on 3 L sat 93%  no fever chills  DTR Lora bedside updated       Allergies :No Known Allergies      PAST MEDICAL & SURGICAL HISTORY:  Afib  Congestive heart failure (CHF)  CVA (cerebral vascular accident)  Hypertension, unspecified type  No significant past surgical history        Medications:  MEDICATIONS  (STANDING):  albuterol/ipratropium for Nebulization 3 milliLiter(s) Nebulizer every 6 hours  amLODIPine   Tablet 10 milliGRAM(s) Oral daily  apixaban 2.5 milliGRAM(s) Oral every 12 hours  aspirin enteric coated 81 milliGRAM(s) Oral daily  benzonatate 200 milliGRAM(s) Oral three times a day  budesonide  80 MICROgram(s)/formoterol 4.5 MICROgram(s) Inhaler 2 Puff(s) Inhalation two times a day  hydrALAZINE 25 milliGRAM(s) Oral every 8 hours  hydrocodone/homatropine Syrup 5 milliLiter(s) Oral at bedtime  metoprolol succinate ER 50 milliGRAM(s) Oral daily  potassium chloride   Powder 40 milliEquivalent(s) Oral once  predniSONE   Tablet 30 milliGRAM(s) Oral daily  senna 2 Tablet(s) Oral at bedtime    MEDICATIONS  (PRN):  guaiFENesin Oral Liquid (Sugar-Free) 100 milliGRAM(s) Oral every 6 hours PRN Cough  hydrALAZINE Injectable 10 milliGRAM(s) IV Push every 2 hours PRN sbp >17mm/hg      Antibiotics History  oseltamivir 75 milliGRAM(s) Oral once, 12-18-22 @ 20:55, Stop order after: 1 Doses  oseltamivir 30 milliGRAM(s) Oral every 12 hours, 12-19-22 @ 09:00, Stop order after: 4 Days  oseltamivir 30 milliGRAM(s) Oral daily, 12-23-22 @ 00:00, Stop order after: 1 Doses      Heme Medications   apixaban 2.5 milliGRAM(s) Oral every 12 hours, 12-22-22 @ 13:59  aspirin enteric coated 81 milliGRAM(s) Oral daily, 12-17-22 @ 11:33      GI Medications  senna 2 Tablet(s) Oral at bedtime, 12-17-22 @ 11:34, Routine        LABS:                        12.6   14.78 )-----------( 200      ( 23 Dec 2022 06:35 )             38.7     12-23    138  |  101  |  72<H>  ----------------------------<  150<H>  4.0   |  29  |  1.59<H>    Ca    8.9      23 Dec 2022 16:15  Phos  2.5     12-23  Mg     2.2     12-23           RADIOLOGY  CXR:   < from: Xray Chest 1 View- PORTABLE-Routine (Xray Chest 1 View- PORTABLE-Routine in AM.) (12.23.22 @ 10:32) >    ACC: [id number] EXAM:  XR CHEST PORTABLE ROUTINE 1V                          PROCEDURE DATE:  12/23/2022          INTERPRETATION:  AP chest on December 23, 2022 at 10:11 AM. There is   history of heart failure.    Gross heart enlargement again noted.    On December 21 there is a very dense infiltrate in the anterior segment   of the right upper lobe the presently shows better aeration.    Left base process likely a mix of atelectasis and effusion is again noted.    IMPRESSION: As above.    --- End of Report ---      < end of copied text >      VITALS:  T(C): 36.4 (12-23-22 @ 05:06), Max: 36.6 (12-22-22 @ 22:47)  T(F): 97.6 (12-23-22 @ 05:06), Max: 97.8 (12-22-22 @ 22:47)  HR: 78 (12-23-22 @ 15:27) (72 - 91)  BP: 139/74 (12-23-22 @ 05:06) (139/74 - 153/73)  BP(mean): --  ABP: --  ABP(mean): --  RR: 16 (12-23-22 @ 05:06) (16 - 19)  SpO2: 93% (12-23-22 @ 15:27) (93% - 96%)  CVP(mm Hg): --  CVP(cm H2O): --    Ins and Outs     12-23-22 @ 07:01  -  12-23-22 @ 16:54  --------------------------------------------------------  IN: 900 mL / OUT: 0 mL / NET: 900 mL        Height (cm): 170.2 (12-21-22 @ 20:51)        I&O's Detail    23 Dec 2022 07:01  -  23 Dec 2022 16:54  --------------------------------------------------------  IN:    Oral Fluid: 900 mL  Total IN: 900 mL    OUT:  Total OUT: 0 mL    Total NET: 900 mL

## 2022-12-23 NOTE — PROGRESS NOTE ADULT - ASSESSMENT
88 year old male with a PMH of CVA, CHF, HTN, afib on eliquis, hip fx s/p fall and prior intubation for HF exacerbation presenting to [facility] ED for SOB and CP. Pt noted to be hypoxemic with elevated sbp to 200's. Pt tx with NIV, lasix and nitro with +response. Pt downgraded from ICU.     Influenza A  currently on 2L NC, wean as tolerated  completed five days of tamiflu  pulm started symbicort, continue steroid taper and Duonebs ATC  maintain proper precautions  supportive care  physical therapy recommends outpatient PT when cleared    Acute Pulmonary Edema  Hypertensive Urgency with Acute on Chronic Combined CHF  Hold lasix and ARB due to renal function  continue hydralazine 25 q8, metoprolol succ 50 daily and amlodipine 10 daily  continue to monitor BP as worsening renal function  cardiac follow up to optimize meds  monitor volume status, daily weights, I/Os, replete lytes as needed  follow up repeat CXR    Elevated Troponin  NSTEMI, likely type II  cardiology appreciated  cont medical management, cont asa, eliquis, bb    IRINEO on CKD 3  stopped lasix, hold ARB  avoid nephrotoxins  renally adjusted meds  monitor BMP    Chronic Atrial Fibrillation  continue metoprolol for rate control, eliquis for stroke ppx    History of CVA  continue ASA    DVT PPx  eliquis adjusted to 2.5 BID due to worsening renal status    Full code  palliative following    patient prefers to update family on his own         88 year old male with a PMH of CVA, CHF, HTN, afib on eliquis, hip fx s/p fall and prior intubation for HF exacerbation presenting to [facility] ED for SOB and CP. Pt noted to be hypoxemic with elevated sbp to 200's. Pt tx with NIV, lasix and nitro with +response. Pt downgraded from ICU.     Acute hypoxic respiratory failure secondary to  Influenza A  ?possible superimposed bacterial infection  currently on 2L NC, wean as tolerated  completed five days of tamiflu  pulm started symbicort, continue steroid taper and Duonebs ATC  maintain proper precautions  supportive care  physical therapy recommends outpatient PT when cleared  - will order a procalcitonin, may start abx.     Acute Pulmonary Edema  Hypertensive Urgency with Acute on Chronic Combined CHF  Hold lasix and ARB due to renal function  continue hydralazine 25 q8, metoprolol succ 50 daily and amlodipine 10 daily  continue to monitor BP as worsening renal function  cardiac follow up to optimize meds  monitor volume status, daily weights, I/Os, replete lytes as needed  follow up repeat CXR    Elevated Troponin  NSTEMI, likely type II  cardiology appreciated  cont medical management, cont asa, eliquis, bb    IRINEO on CKD 3  stopped lasix, hold ARB  avoid nephrotoxins  renally adjusted meds  monitor BMP  may involve renal if does not improve.    Chronic Atrial Fibrillation  continue metoprolol for rate control, eliquis for stroke ppx    History of CVA  continue ASA    DVT PPx  eliquis adjusted to 2.5 BID due to worsening renal status    Full code  palliative following    patient prefers to update family on his own

## 2022-12-23 NOTE — PHARMACOTHERAPY INTERVENTION NOTE - COMMENTS
spoke to family member about home meds and called pharmacy - patient will need a new Rx for Eliquis upon discharge (at lower dose).

## 2022-12-23 NOTE — PROGRESS NOTE ADULT - SUBJECTIVE AND OBJECTIVE BOX
Patient is a 88y old  Male who presents with a chief complaint of shortness of breath (22 Dec 2022 13:53)    Patient seen and examined at bedside.  no acute overnight events, pt coughing at bedside this am    ALLERGIES:  No Known Allergies        Vital Signs Last 24 Hrs  T(F): 97.6 (23 Dec 2022 05:06), Max: 97.8 (22 Dec 2022 22:47)  HR: 75 (23 Dec 2022 05:16) (72 - 95)  BP: 139/74 (23 Dec 2022 05:06) (124/63 - 153/73)  RR: 16 (23 Dec 2022 05:06) (16 - 19)  SpO2: 96% (23 Dec 2022 05:06) (95% - 98%)  I&O's Summary    MEDICATIONS:  albuterol/ipratropium for Nebulization 3 milliLiter(s) Nebulizer every 6 hours  amLODIPine   Tablet 10 milliGRAM(s) Oral daily  apixaban 2.5 milliGRAM(s) Oral every 12 hours  aspirin enteric coated 81 milliGRAM(s) Oral daily  budesonide  80 MICROgram(s)/formoterol 4.5 MICROgram(s) Inhaler 2 Puff(s) Inhalation two times a day  hydrALAZINE 25 milliGRAM(s) Oral every 8 hours  hydrALAZINE Injectable 10 milliGRAM(s) IV Push every 2 hours PRN  metoprolol succinate ER 50 milliGRAM(s) Oral daily  pantoprazole  Injectable 40 milliGRAM(s) IV Push two times a day  potassium chloride   Powder 40 milliEquivalent(s) Oral once  predniSONE   Tablet 30 milliGRAM(s) Oral daily  senna 2 Tablet(s) Oral at bedtime      PHYSICAL EXAM:  General: NAD, A/O x 3  ENT: Dry mucous membranes, no oral thrush  Neck: Supple, No JVD  Lungs: Slightly Diminished, Bilateral air entry, good inspiratory effort  Cardio: S1S2 irregular  Abdomen: Soft, Nontender, Nondistended; Bowel sounds present  Extremities: No cyanosis, No edema    LABS:                        12.6   14.78 )-----------( 200      ( 23 Dec 2022 06:35 )             38.7     12-23    140  |  103  |  73  ----------------------------<  138  3.6   |  27  |  1.75    Ca    9.0      23 Dec 2022 06:35  Phos  2.5     12-23  Mg     2.2     12-23            CARDIAC MARKERS ( 21 Dec 2022 05:45 )  x     / 79.6 ng/L / x     / x     / x                                    RADIOLOGY & ADDITIONAL TESTS:    Care Discussed with Consultants/Other Providers:    Patient is a 88y old  Male who presents with a chief complaint of shortness of breath (22 Dec 2022 13:53)    Patient seen and examined at bedside.  no acute overnight events, pt coughing at bedside this am    ALLERGIES:  No Known Allergies        Vital Signs Last 24 Hrs  T(F): 97.6 (23 Dec 2022 05:06), Max: 97.8 (22 Dec 2022 22:47)  HR: 75 (23 Dec 2022 05:16) (72 - 95)  BP: 139/74 (23 Dec 2022 05:06) (124/63 - 153/73)  RR: 16 (23 Dec 2022 05:06) (16 - 19)  SpO2: 96% (23 Dec 2022 05:06) (95% - 98%)  I&O's Summary    MEDICATIONS:  albuterol/ipratropium for Nebulization 3 milliLiter(s) Nebulizer every 6 hours  amLODIPine   Tablet 10 milliGRAM(s) Oral daily  apixaban 2.5 milliGRAM(s) Oral every 12 hours  aspirin enteric coated 81 milliGRAM(s) Oral daily  budesonide  80 MICROgram(s)/formoterol 4.5 MICROgram(s) Inhaler 2 Puff(s) Inhalation two times a day  hydrALAZINE 25 milliGRAM(s) Oral every 8 hours  hydrALAZINE Injectable 10 milliGRAM(s) IV Push every 2 hours PRN  metoprolol succinate ER 50 milliGRAM(s) Oral daily  pantoprazole  Injectable 40 milliGRAM(s) IV Push two times a day  potassium chloride   Powder 40 milliEquivalent(s) Oral once  predniSONE   Tablet 30 milliGRAM(s) Oral daily  senna 2 Tablet(s) Oral at bedtime      PHYSICAL EXAM:  General: NAD, A/O   ENT: Dry mucous membranes, no oral thrush  Neck: Supple, No JVD  Lungs: Slightly Diminished, Bilateral air entry, good inspiratory effort  Cardio: S1S2 irregular  Abdomen: Soft, Nontender, Nondistended; Bowel sounds present  Extremities: No cyanosis, No edema    LABS:                        12.6   14.78 )-----------( 200      ( 23 Dec 2022 06:35 )             38.7     12-23    140  |  103  |  73  ----------------------------<  138  3.6   |  27  |  1.75    Ca    9.0      23 Dec 2022 06:35  Phos  2.5     12-23  Mg     2.2     12-23            CARDIAC MARKERS ( 21 Dec 2022 05:45 )  x     / 79.6 ng/L / x     / x     / x                                    RADIOLOGY & ADDITIONAL TESTS:    Care Discussed with Consultants/Other Providers:

## 2022-12-23 NOTE — PROGRESS NOTE ADULT - ASSESSMENT
Physical Examination:  GENERAL:               Alert,  no acute distress.    HEENT:                    + JVD, Moist MM  PULM:                     Bilateral air entry, Clear to auscultation bilaterally, no significant sputum production, no Rales, No Rhonchi, ++ Wheezing  CVS:                         S1, S2,  +Murmur  ABD:                        Soft, nondistended, nontender, normoactive bowel sounds,   EXT:                         mild edema, nontender, No Cyanosis or Clubbing   NEURO:                  Alert, oriented, interactive, nonfocal, follows commands  PSYC:                      Calm, + Insight.        88 year old male with a PMH of CVA, CHF, HTN, afib on eliquis, hip fx s/p fall and prior intubation for HF exacerbation presenting to [facility] ED for SOB and CP. Pt noted to be hypoxemic with elevated sbp to 200's. Pt tx with NIV, lasix and nitro with +response. ICU team consulted. Influenza A + with mild elevated troponin, elevated bnp and ?IRINEO on labs. When we arrived pt was alert and oriented, with O2 sat 100%, rate controlled afib on monitor and sbp 140. Pt trial off bipap with NC ~4 liters without desaturation or respiratory distress. ABG obtained with the following results: 7.37 /  45 / 87 / 26 97% on 4 liters NC. Troponin mildly elevated and has stabilized with no major change and ekg is without acute ischemia noted currently appearing to be demand ischemia at this time (also he is on AC at home), cardiology contacted by ED MD. BNP elevated to 6k. Given pt appears comfortable and states that he is "feeling better" along with his neurological, respiratory (off NIV) and hemodynamic stability  he is not currently a candidate for ICU and is appropriate for monitoring on telemetry under medicine service. However, if any changes should occur please contact our team immediately. Thank you. Recommending the following:    Assessment  1. Influenza A  2. 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 Elevated trop, r/o NSTEMI  4. Underlying Afib on Eliquis, HTN, CVA,   5. Increasing Cr / IRINEO normal baseline    Plan   Hold Lasix/ARB at this time  monitor BP as worsening renal function     n/c o2 to maintain sat  Continue symbicort  start muccinex and tesselon for cough  Oral diet  encouraged incentive spirometry   prednisone taper  continue  duoneb atc  ASA, A/c with eliquis  Cardio Follow up to optimize cardiac meds.     PMD:				                   Notified(Date):  Family Updated: 	DTR Lora 	                Date: 12/23/22   - updated on status     Sedation & Analgesia:	  Diet/Nutrition:		Diet, Minced and Moist:   Consistent Carbohydrate No Snacks  DASH/TLC Sodium & Cholesterol Restricted (12-18-22 @ 08:09) [Active]     GI PPx:			  senna 2 Tablet(s) Oral at bedtime     DVT Ppx:		  apixaban 5 milliGRAM(s) Oral two times a day  aspirin enteric coated 81 milliGRAM(s) Oral daily      Activity:		    Head of Bed:               35-45 Deg  Glycemic Control:              Lines: PIV    Restraints were deemed necessary to prevent removal of life-sustaining devices [  ] YES   [ x   ]  NO    Disposition: Continue care on medical floor , d/w Jenna Martinez    Goals of Care: Full code

## 2022-12-24 LAB
ANION GAP SERPL CALC-SCNC: 7 MMOL/L — SIGNIFICANT CHANGE UP (ref 5–17)
BUN SERPL-MCNC: 64 MG/DL — HIGH (ref 7–23)
CALCIUM SERPL-MCNC: 8.4 MG/DL — SIGNIFICANT CHANGE UP (ref 8.4–10.5)
CHLORIDE SERPL-SCNC: 99 MMOL/L — SIGNIFICANT CHANGE UP (ref 96–108)
CO2 SERPL-SCNC: 28 MMOL/L — SIGNIFICANT CHANGE UP (ref 22–31)
CREAT SERPL-MCNC: 1.5 MG/DL — HIGH (ref 0.5–1.3)
EGFR: 44 ML/MIN/1.73M2 — LOW
GLUCOSE SERPL-MCNC: 119 MG/DL — HIGH (ref 70–99)
HCT VFR BLD CALC: 37.6 % — LOW (ref 39–50)
HGB BLD-MCNC: 12.6 G/DL — LOW (ref 13–17)
MCHC RBC-ENTMCNC: 32.1 PG — SIGNIFICANT CHANGE UP (ref 27–34)
MCHC RBC-ENTMCNC: 33.5 GM/DL — SIGNIFICANT CHANGE UP (ref 32–36)
MCV RBC AUTO: 95.9 FL — SIGNIFICANT CHANGE UP (ref 80–100)
NRBC # BLD: 0 /100 WBCS — SIGNIFICANT CHANGE UP (ref 0–0)
PLATELET # BLD AUTO: 210 K/UL — SIGNIFICANT CHANGE UP (ref 150–400)
POTASSIUM SERPL-MCNC: 3.7 MMOL/L — SIGNIFICANT CHANGE UP (ref 3.5–5.3)
POTASSIUM SERPL-SCNC: 3.7 MMOL/L — SIGNIFICANT CHANGE UP (ref 3.5–5.3)
PROCALCITONIN SERPL-MCNC: 0.32 NG/ML — HIGH
RBC # BLD: 3.92 M/UL — LOW (ref 4.2–5.8)
RBC # FLD: 13.6 % — SIGNIFICANT CHANGE UP (ref 10.3–14.5)
SODIUM SERPL-SCNC: 134 MMOL/L — LOW (ref 135–145)
WBC # BLD: 13.74 K/UL — HIGH (ref 3.8–10.5)
WBC # FLD AUTO: 13.74 K/UL — HIGH (ref 3.8–10.5)

## 2022-12-24 PROCEDURE — 99233 SBSQ HOSP IP/OBS HIGH 50: CPT | Mod: FS

## 2022-12-24 PROCEDURE — 99232 SBSQ HOSP IP/OBS MODERATE 35: CPT

## 2022-12-24 RX ORDER — SODIUM CHLORIDE 0.65 %
1 AEROSOL, SPRAY (ML) NASAL EVERY 6 HOURS
Refills: 0 | Status: DISCONTINUED | OUTPATIENT
Start: 2022-12-24 | End: 2023-01-03

## 2022-12-24 RX ORDER — FUROSEMIDE 40 MG
40 TABLET ORAL ONCE
Refills: 0 | Status: COMPLETED | OUTPATIENT
Start: 2022-12-24 | End: 2022-12-24

## 2022-12-24 RX ORDER — HYDRALAZINE HCL 50 MG
25 TABLET ORAL EVERY 8 HOURS
Refills: 0 | Status: DISCONTINUED | OUTPATIENT
Start: 2022-12-24 | End: 2023-01-03

## 2022-12-24 RX ADMIN — Medication 200 MILLIGRAM(S): at 21:16

## 2022-12-24 RX ADMIN — AMLODIPINE BESYLATE 10 MILLIGRAM(S): 2.5 TABLET ORAL at 05:20

## 2022-12-24 RX ADMIN — Medication 25 MILLIGRAM(S): at 05:19

## 2022-12-24 RX ADMIN — APIXABAN 2.5 MILLIGRAM(S): 2.5 TABLET, FILM COATED ORAL at 17:43

## 2022-12-24 RX ADMIN — Medication 3 MILLILITER(S): at 05:17

## 2022-12-24 RX ADMIN — Medication 200 MILLIGRAM(S): at 17:42

## 2022-12-24 RX ADMIN — SENNA PLUS 2 TABLET(S): 8.6 TABLET ORAL at 21:16

## 2022-12-24 RX ADMIN — Medication 1 SPRAY(S): at 17:43

## 2022-12-24 RX ADMIN — Medication 40 MILLIGRAM(S): at 22:45

## 2022-12-24 RX ADMIN — APIXABAN 2.5 MILLIGRAM(S): 2.5 TABLET, FILM COATED ORAL at 05:20

## 2022-12-24 RX ADMIN — Medication 1 SPRAY(S): at 21:15

## 2022-12-24 RX ADMIN — Medication 600 MILLIGRAM(S): at 05:21

## 2022-12-24 RX ADMIN — Medication 30 MILLIGRAM(S): at 05:20

## 2022-12-24 RX ADMIN — BUDESONIDE AND FORMOTEROL FUMARATE DIHYDRATE 2 PUFF(S): 160; 4.5 AEROSOL RESPIRATORY (INHALATION) at 10:34

## 2022-12-24 RX ADMIN — Medication 40 MILLIEQUIVALENT(S): at 05:22

## 2022-12-24 RX ADMIN — Medication 3 MILLILITER(S): at 16:28

## 2022-12-24 RX ADMIN — Medication 600 MILLIGRAM(S): at 17:43

## 2022-12-24 RX ADMIN — Medication 81 MILLIGRAM(S): at 11:25

## 2022-12-24 RX ADMIN — Medication 25 MILLIGRAM(S): at 21:16

## 2022-12-24 RX ADMIN — Medication 200 MILLIGRAM(S): at 05:19

## 2022-12-24 RX ADMIN — Medication 50 MILLIGRAM(S): at 05:19

## 2022-12-24 RX ADMIN — Medication 3 MILLILITER(S): at 10:34

## 2022-12-24 NOTE — PROGRESS NOTE ADULT - ASSESSMENT
88 year old male with a PMH of CVA, CHF, HTN, afib on eliquis, hip fx s/p fall and prior intubation for HF exacerbation presenting to [facility] ED for SOB and CP. Pt noted to be hypoxemic with elevated sbp to 200's. Pt tx with NIV, lasix and nitro with +response. Pt downgraded from ICU.     Acute hypoxic respiratory failure secondary to  Influenza A  ?possible superimposed bacterial infection  currently on 2L NC, wean as tolerated  completed five days of tamiflu  pulm started symbicort, continue steroid taper and Duonebs ATC  maintain proper precautions  supportive care  physical therapy recommends outpatient PT when cleared  - will order a procalcitonin, may start abx.     Acute Pulmonary Edema  Hypertensive Urgency with Acute on Chronic Combined CHF  Hold lasix and ARB due to renal function  continue hydralazine 25 q8, metoprolol succ 50 daily and amlodipine 10 daily  continue to monitor BP as worsening renal function  cardiac follow up to optimize meds  monitor volume status, daily weights, I/Os, replete lytes as needed  repeat CXR on 12/23 revealed left base process likely mix of atelectasis and effusion, RUL dense infiltrate showed better aeration    Elevated Troponin  NSTEMI, likely type II  cardiology appreciated  cont medical management, cont asa, eliquis, bb    IRINEO on CKD 3  stopped lasix, hold ARB  avoid nephrotoxins  renally adjusted meds  monitor BMP  may involve renal if does not improve.    Chronic Atrial Fibrillation  continue metoprolol for rate control, eliquis for stroke ppx    History of CVA  continue ASA    DVT PPx  eliquis adjusted to 2.5 BID due to worsening renal status    Full code  palliative following    patient prefers to update family on his own 88 year old male with a PMH of CVA, CHF, HTN, afib on eliquis, hip fx s/p fall and prior intubation for HF exacerbation presenting to [facility] ED for SOB and CP. Pt noted to be hypoxemic with elevated sbp to 200's. Pt tx with NIV, lasix and nitro with +response. Pt downgraded from ICU.     Acute hypoxic respiratory failure secondary to  Influenza A  ?possible superimposed bacterial infection  currently on 2L NC, wean as tolerated  completed five days of tamiflu  pulm started symbicort, continue steroid taper and Duonebs ATC  started tesssalon perles atc, hycodan and robitussin prn for cough  maintain proper precautions  supportive care  physical therapy recommends outpatient PT when cleared  - will order a procalcitonin, may start abx.     Acute Pulmonary Edema  Hypertensive Urgency with Acute on Chronic Combined CHF  Hold lasix and ARB due to renal function  continue hydralazine 25 q8, metoprolol succ 50 daily and amlodipine 10 daily  continue to monitor BP as worsening renal function  cardiac follow up to optimize meds  monitor volume status, daily weights, I/Os, replete lytes as needed  repeat CXR on 12/23 revealed left base process likely mix of atelectasis and effusion, RUL dense infiltrate showed better aeration    Elevated Troponin  NSTEMI, likely type II  cardiology appreciated  cont medical management, cont asa, eliquis, bb    IRINEO on CKD 3  stopped lasix, hold ARB  avoid nephrotoxins  renally adjusted meds  monitor BMP  may involve renal if does not improve.    Chronic Atrial Fibrillation  continue metoprolol for rate control, eliquis for stroke ppx    History of CVA  continue ASA    DVT PPx  eliquis adjusted to 2.5 BID due to worsening renal status    Full code  palliative following    patient prefers to update family on his own 88 year old male with a PMH of CVA, CHF, HTN, afib on eliquis, hip fx s/p fall and prior intubation for HF exacerbation presenting to [facility] ED for SOB and CP. Pt noted to be hypoxemic with elevated sbp to 200's. Pt tx with NIV, lasix and nitro with +response. Pt downgraded from ICU.     Acute hypoxic respiratory failure secondary to  Influenza A  currently on 2L NC, wean as tolerated  completed five days of tamiflu  pulm started symbicort, nasal saline spray, continue steroid taper and Duonebs ATC  started tesssalon perles atc, hycodan and robitussin prn for cough=  physical therapy recommends outpatient PT when cleared    Acute Pulmonary Edema  Hypertensive Urgency with Acute on Chronic Combined CHF  Hold lasix and ARB due to renal function  continue hydralazine 25 q8, metoprolol succ 50 daily and amlodipine 10 daily  continue to monitor BP as worsening renal function  cardiac follow up to optimize meds  monitor volume status, daily weights, I/Os, replete lytes as needed  repeat CXR on 12/23 revealed left base process likely mix of atelectasis and effusion, RUL dense infiltrate showed better aeration  may repeat a CXR tomorrow... continuing to hold lasix d/t IRINEO, may restart tomorrow.    Elevated Troponin  NSTEMI, likely type II  cardiology appreciated  cont medical management, cont asa, eliquis, bb    IRINEO on CKD 3  stopped lasix, hold ARB  avoid nephrotoxins  renally adjusted meds  monitor BMP  may involve renal if does not improve.    Chronic Atrial Fibrillation  continue metoprolol for rate control, eliquis for stroke ppx    History of CVA  continue ASA    DVT PPx  eliquis adjusted to 2.5 BID due to worsening renal status    Full code  palliative following    patient prefers to update family on his own

## 2022-12-24 NOTE — PROGRESS NOTE ADULT - ASSESSMENT
Physical Examination:  GENERAL:               Alert,  no acute distress.    HEENT:                    + JVD, Moist MM  PULM:                     Bilateral air entry, Clear to auscultation bilaterally, no significant sputum production, no Rales, No Rhonchi, ++ Wheezing  CVS:                         S1, S2,  +Murmur  ABD:                        Soft, nondistended, nontender, normoactive bowel sounds,   EXT:                         mild edema, nontender, No Cyanosis or Clubbing   NEURO:                  Alert, oriented, interactive, nonfocal, follows commands  PSYC:                      Calm, + Insight.        88 year old male with a PMH of CVA, CHF, HTN, afib on eliquis, hip fx s/p fall and prior intubation for HF exacerbation presenting to [facility] ED for SOB and CP. Pt noted to be hypoxemic with elevated sbp to 200's. Pt tx with NIV, lasix and nitro with +response. ICU team consulted. Influenza A + with mild elevated troponin, elevated bnp and ?IRINEO on labs. When we arrived pt was alert and oriented, with O2 sat 100%, rate controlled afib on monitor and sbp 140. Pt trial off bipap with NC ~4 liters without desaturation or respiratory distress. ABG obtained with the following results: 7.37 /  45 / 87 / 26 97% on 4 liters NC. Troponin mildly elevated and has stabilized with no major change and ekg is without acute ischemia noted currently appearing to be demand ischemia at this time (also he is on AC at home), cardiology contacted by ED MD. BNP elevated to 6k. Given pt appears comfortable and states that he is "feeling better" along with his neurological, respiratory (off NIV) and hemodynamic stability  he is not currently a candidate for ICU and is appropriate for monitoring on telemetry under medicine service. However, if any changes should occur please contact our team immediately. Thank you. Recommending the following:    Assessment  1. Influenza A  2. 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 Elevated trop, r/o NSTEMI  4. Underlying Afib on Eliquis, HTN, CVA,   5. Increasing Cr / IRINEO normal baseline    Plan   n/c o2 to maintain sat  Continue symbicort  continue muccinex and tesselon for cough  Oral diet  encouraged incentive spirometry   prednisone taper  continue  duoneb atc  ASA, A/c with eliquis  Cardio Follow up to optimize cardiac meds.     PMD:				                   Notified(Date):  Family Updated: 	DTR Lora 	                Date: 12/23/22   - updated on status     Sedation & Analgesia:	  Diet/Nutrition:		Diet, Minced and Moist:   Consistent Carbohydrate No Snacks  DASH/TLC Sodium & Cholesterol Restricted (12-18-22 @ 08:09) [Active]     GI PPx:			  senna 2 Tablet(s) Oral at bedtime     DVT Ppx:		  apixaban 5 milliGRAM(s) Oral two times a day  aspirin enteric coated 81 milliGRAM(s) Oral daily      Activity:		    Head of Bed:               35-45 Deg  Glycemic Control:              Lines: PIV    Restraints were deemed necessary to prevent removal of life-sustaining devices [  ] YES   [ x   ]  NO    Disposition: Continue care on medical floor , d/w Jenna Martinez    Goals of Care: Full code Physical Examination:  GENERAL:               Alert,  no acute distress.    HEENT:                    + JVD, Moist MM  PULM:                     Bilateral air entry, Clear to auscultation bilaterally, no significant sputum production, no Rales, No Rhonchi, ++ Wheezing  CVS:                         S1, S2,  +Murmur  ABD:                        Soft, nondistended, nontender, normoactive bowel sounds,   EXT:                         mild edema, nontender, No Cyanosis or Clubbing   NEURO:                  Alert, oriented, interactive, nonfocal, follows commands  PSYC:                      Calm, + Insight.        88 year old male with a PMH of CVA, CHF, HTN, afib on eliquis, hip fx s/p fall and prior intubation for HF exacerbation presenting to [facility] ED for SOB and CP. Pt noted to be hypoxemic with elevated sbp to 200's. Pt tx with NIV, lasix and nitro with +response. ICU team consulted. Influenza A + with mild elevated troponin, elevated bnp and ?IRINEO on labs. When we arrived pt was alert and oriented, with O2 sat 100%, rate controlled afib on monitor and sbp 140. Pt trial off bipap with NC ~4 liters without desaturation or respiratory distress. ABG obtained with the following results: 7.37 /  45 / 87 / 26 97% on 4 liters NC. Troponin mildly elevated and has stabilized with no major change and ekg is without acute ischemia noted currently appearing to be demand ischemia at this time (also he is on AC at home), cardiology contacted by ED MD. BNP elevated to 6k. Given pt appears comfortable and states that he is "feeling better" along with his neurological, respiratory (off NIV) and hemodynamic stability  he is not currently a candidate for ICU and is appropriate for monitoring on telemetry under medicine service. However, if any changes should occur please contact our team immediately. Thank you. Recommending the following:    Assessment  1. Influenza A  2. 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 Elevated trop, r/o NSTEMI  4. Underlying Afib on Eliquis, HTN, CVA,   5. Increasing Cr / IRINEO normal baseline    Plan   n/c o2 to maintain sat  add nasal saline spray   Continue Symbicort  continue Mucinex and tessalon for cough  Oral diet  encouraged incentive spirometry   prednisone taper  continue  duoneb atc  ASA, A/c with eliquis  Cardio Follow up to optimize cardiac meds.     PMD:				                   Notified(Date):  Family Updated: 	DTR Lora 	                Date: 12/23/22   - updated on status     Sedation & Analgesia:	  Diet/Nutrition:		Diet, Minced and Moist:   Consistent Carbohydrate No Snacks  DASH/TLC Sodium & Cholesterol Restricted (12-18-22 @ 08:09) [Active]     GI PPx:			  senna 2 Tablet(s) Oral at bedtime     DVT Ppx:		  apixaban 5 milliGRAM(s) Oral two times a day  aspirin enteric coated 81 milliGRAM(s) Oral daily      Activity:		    Head of Bed:               35-45 Deg  Glycemic Control:              Lines: PIV    Restraints were deemed necessary to prevent removal of life-sustaining devices [  ] YES   [ x   ]  NO    Disposition: Continue care on medical floor , d/w Jenna Martinez    Goals of Care: Full code

## 2022-12-24 NOTE — PROGRESS NOTE ADULT - SUBJECTIVE AND OBJECTIVE BOX
Follow-up Pulmonary Progress Note  Chief Complaint : Heart failure        patient on n/c o2  comfortable   states has sob because nose is stuffy  shows dry secretions when he blew his nose        Allergies :No Known Allergies      PAST MEDICAL & SURGICAL HISTORY:  Afib    Congestive heart failure (CHF)    CVA (cerebral vascular accident)    Hypertension, unspecified type    No significant past surgical history        Medications:  MEDICATIONS  (STANDING):  albuterol/ipratropium for Nebulization 3 milliLiter(s) Nebulizer every 6 hours  amLODIPine   Tablet 10 milliGRAM(s) Oral daily  apixaban 2.5 milliGRAM(s) Oral every 12 hours  aspirin enteric coated 81 milliGRAM(s) Oral daily  benzonatate 200 milliGRAM(s) Oral three times a day  budesonide  80 MICROgram(s)/formoterol 4.5 MICROgram(s) Inhaler 2 Puff(s) Inhalation two times a day  guaiFENesin  milliGRAM(s) Oral every 12 hours  hydrALAZINE 25 milliGRAM(s) Oral every 8 hours  hydrocodone/homatropine Syrup 5 milliLiter(s) Oral at bedtime  metoprolol succinate ER 50 milliGRAM(s) Oral daily  predniSONE   Tablet 30 milliGRAM(s) Oral daily  senna 2 Tablet(s) Oral at bedtime  sodium chloride 0.65% Nasal 1 Spray(s) Both Nostrils every 6 hours    MEDICATIONS  (PRN):  hydrALAZINE Injectable 10 milliGRAM(s) IV Push every 2 hours PRN sbp >17mm/hg      Antibiotics History  oseltamivir 75 milliGRAM(s) Oral once, 12-18-22 @ 20:55, Stop order after: 1 Doses  oseltamivir 30 milliGRAM(s) Oral every 12 hours, 12-19-22 @ 09:00, Stop order after: 4 Days  oseltamivir 30 milliGRAM(s) Oral daily, 12-23-22 @ 00:00, Stop order after: 1 Doses      Heme Medications   apixaban 2.5 milliGRAM(s) Oral every 12 hours, 12-22-22 @ 13:59  aspirin enteric coated 81 milliGRAM(s) Oral daily, 12-17-22 @ 11:33      GI Medications  senna 2 Tablet(s) Oral at bedtime, 12-17-22 @ 11:34, Routine        LABS:                        12.6   13.74 )-----------( 210      ( 24 Dec 2022 06:00 )             37.6     12-24    134<L>  |  99  |  64<H>  ----------------------------<  119<H>  3.7   |  28  |  1.50<H>    Ca    8.4      24 Dec 2022 06:00  Phos  2.5     12-23  Mg     2.2     12-23         Procalcitonin, Serum: 0.32 ng/mL (12-24-22 @ 06:00)  Procalcitonin, Serum: 0.28 ng/mL (12-23-22 @ 16:15)  Procalcitonin, Serum: 0.25 ng/mL (12-23-22 @ 06:35)    Serum Pro-Brain Natriuretic Peptide: 3533 pg/mL (12-23-22 @ 06:35)         VITALS:  T(C): 36.9 (12-24-22 @ 05:16), Max: 36.9 (12-24-22 @ 05:16)  T(F): 98.5 (12-24-22 @ 05:16), Max: 98.5 (12-24-22 @ 05:16)  HR: 75 (12-24-22 @ 10:36) (75 - 85)  BP: 152/69 (12-24-22 @ 05:16) (130/59 - 152/69)  BP(mean): --  ABP: --  ABP(mean): --  RR: 18 (12-24-22 @ 05:16) (17 - 19)  SpO2: 95% (12-24-22 @ 10:36) (93% - 98%)  CVP(mm Hg): --  CVP(cm H2O): --    Ins and Outs     12-23-22 @ 07:01  -  12-24-22 @ 07:00  --------------------------------------------------------  IN: 900 mL / OUT: 250 mL / NET: 650 mL    12-24-22 @ 07:01 - 12-24-22 @ 13:57  --------------------------------------------------------  IN: 400 mL / OUT: 0 mL / NET: 400 mL        Height (cm): 170.2 (12-21-22 @ 20:51)        I&O's Detail    23 Dec 2022 07:01  -  24 Dec 2022 07:00  --------------------------------------------------------  IN:    Oral Fluid: 900 mL  Total IN: 900 mL    OUT:    Voided (mL): 250 mL  Total OUT: 250 mL    Total NET: 650 mL      24 Dec 2022 07:01  -  24 Dec 2022 13:57  --------------------------------------------------------  IN:    Oral Fluid: 400 mL  Total IN: 400 mL    OUT:  Total OUT: 0 mL    Total NET: 400 mL    see ambulance record

## 2022-12-24 NOTE — PROGRESS NOTE ADULT - SUBJECTIVE AND OBJECTIVE BOX
Patient is a 88y old  Male who presents with a chief complaint of shortness of breath (23 Dec 2022 16:53)    Patient seen and examined at bedside.  no acute overnight events    ALLERGIES:  No Known Allergies        Vital Signs Last 24 Hrs  T(F): 98.5 (24 Dec 2022 05:16), Max: 98.5 (24 Dec 2022 05:16)  HR: 85 (24 Dec 2022 05:16) (78 - 91)  BP: 152/69 (24 Dec 2022 05:16) (130/59 - 152/69)  RR: 18 (24 Dec 2022 05:16) (17 - 19)  SpO2: 98% (24 Dec 2022 05:48) (93% - 98%)  I&O's Summary    23 Dec 2022 07:01  -  24 Dec 2022 07:00  --------------------------------------------------------  IN: 900 mL / OUT: 250 mL / NET: 650 mL      MEDICATIONS:  albuterol/ipratropium for Nebulization 3 milliLiter(s) Nebulizer every 6 hours  amLODIPine   Tablet 10 milliGRAM(s) Oral daily  apixaban 2.5 milliGRAM(s) Oral every 12 hours  aspirin enteric coated 81 milliGRAM(s) Oral daily  benzonatate 200 milliGRAM(s) Oral three times a day  budesonide  80 MICROgram(s)/formoterol 4.5 MICROgram(s) Inhaler 2 Puff(s) Inhalation two times a day  guaiFENesin  milliGRAM(s) Oral every 12 hours  hydrALAZINE 25 milliGRAM(s) Oral every 8 hours  hydrALAZINE Injectable 10 milliGRAM(s) IV Push every 2 hours PRN  hydrocodone/homatropine Syrup 5 milliLiter(s) Oral at bedtime  metoprolol succinate ER 50 milliGRAM(s) Oral daily  predniSONE   Tablet 30 milliGRAM(s) Oral daily  senna 2 Tablet(s) Oral at bedtime      PHYSICAL EXAM:  General: NAD, A/O x 3  ENT: MMM, no thrush  Neck: Supple, No JVD  Lungs: Diminished and Course BS, good inspiratory effort, bilateral air entry  Cardio: S1S2 irregular  Abdomen: Soft, Nontender, Nondistended; Bowel sounds present  Extremities: No cyanosis, No edema    LABS:                        12.6   13.74 )-----------( 210      ( 24 Dec 2022 06:00 )             37.6     12-24    134  |  99  |  64  ----------------------------<  119  3.7   |  28  |  1.50    Ca    8.4      24 Dec 2022 06:00  Phos  2.5     12-23  Mg     2.2     12-23                                          RADIOLOGY & ADDITIONAL TESTS:    Care Discussed with Consultants/Other Providers:    Patient is a 88y old  Male who presents with a chief complaint of shortness of breath (23 Dec 2022 16:53)    Patient seen and examined at bedside.  no acute overnight events    ALLERGIES:  No Known Allergies        Vital Signs Last 24 Hrs  T(F): 98.5 (24 Dec 2022 05:16), Max: 98.5 (24 Dec 2022 05:16)  HR: 85 (24 Dec 2022 05:16) (78 - 91)  BP: 152/69 (24 Dec 2022 05:16) (130/59 - 152/69)  RR: 18 (24 Dec 2022 05:16) (17 - 19)  SpO2: 98% (24 Dec 2022 05:48) (93% - 98%)  I&O's Summary    23 Dec 2022 07:01  -  24 Dec 2022 07:00  --------------------------------------------------------  IN: 900 mL / OUT: 250 mL / NET: 650 mL      MEDICATIONS:  albuterol/ipratropium for Nebulization 3 milliLiter(s) Nebulizer every 6 hours  amLODIPine   Tablet 10 milliGRAM(s) Oral daily  apixaban 2.5 milliGRAM(s) Oral every 12 hours  aspirin enteric coated 81 milliGRAM(s) Oral daily  benzonatate 200 milliGRAM(s) Oral three times a day  budesonide  80 MICROgram(s)/formoterol 4.5 MICROgram(s) Inhaler 2 Puff(s) Inhalation two times a day  guaiFENesin  milliGRAM(s) Oral every 12 hours  hydrALAZINE 25 milliGRAM(s) Oral every 8 hours  hydrALAZINE Injectable 10 milliGRAM(s) IV Push every 2 hours PRN  hydrocodone/homatropine Syrup 5 milliLiter(s) Oral at bedtime  metoprolol succinate ER 50 milliGRAM(s) Oral daily  predniSONE   Tablet 30 milliGRAM(s) Oral daily  senna 2 Tablet(s) Oral at bedtime      PHYSICAL EXAM:  General: NAD, A/O x 3  ENT: MMM, no thrush  Neck: Supple, No JVD  Lungs: Diminished and Course BS, good inspiratory effort, bilateral air entry  Cardio: S1S2 irregular  Abdomen: Soft, Nontender, Nondistended; Bowel sounds present  Extremities: No cyanosis, No edema    LABS:                        12.6   13.74 )-----------( 210      ( 24 Dec 2022 06:00 )             37.6     12-24    134  |  99  |  64  ----------------------------<  119  3.7   |  28  |  1.50    Ca    8.4      24 Dec 2022 06:00  Phos  2.5     12-23  Mg     2.2     12-23      RADIOLOGY & ADDITIONAL TESTS:  < from: TTE Echo Complete w/o Contrast w/ Doppler (12.17.22 @ 12:52) >    Summary:   1. Biatrial enlargement   2. Left ventricular ejection fraction, by visual estimation, is 45 to   50%.   3. Mildly decreased global left ventricular systolic function.   4. There is mild concentric left ventricular hypertrophy.   5. Moderate mitral valve regurgitation.   6. Moderate thickening and calcification of the anterior and posterior   mitral valve leaflets.   7. Mild-moderate tricuspid regurgitation.   8. Mild pulmonic valve regurgitation.   9. Apical septal segment, apex, and basal and mid inferior septum are   abnormal as described above.  < end of copied text >      Care Discussed with Consultants/Other Providers:

## 2022-12-24 NOTE — PROGRESS NOTE ADULT - ASSESSMENT
respiratory viral infection  chf, hf mildly reduced ef  cad  new af, controlled vr  hbp    suggest    lasix  f/u labs  oral a/c  continue bp control

## 2022-12-24 NOTE — PROGRESS NOTE ADULT - SUBJECTIVE AND OBJECTIVE BOX
Follow up for :   af, cad, respiratory virus and chf    SUBJ: nasal stuffiness    PMH  Afib    Congestive heart failure (CHF)    CVA (cerebral vascular accident)    Hypertension, unspecified type        MEDICATIONS  (STANDING):  albuterol/ipratropium for Nebulization 3 milliLiter(s) Nebulizer every 6 hours  amLODIPine   Tablet 10 milliGRAM(s) Oral daily  apixaban 2.5 milliGRAM(s) Oral every 12 hours  aspirin enteric coated 81 milliGRAM(s) Oral daily  benzonatate 200 milliGRAM(s) Oral three times a day  budesonide  80 MICROgram(s)/formoterol 4.5 MICROgram(s) Inhaler 2 Puff(s) Inhalation two times a day  guaiFENesin  milliGRAM(s) Oral every 12 hours  hydrALAZINE 25 milliGRAM(s) Oral every 8 hours  hydrocodone/homatropine Syrup 5 milliLiter(s) Oral at bedtime  metoprolol succinate ER 50 milliGRAM(s) Oral daily  predniSONE   Tablet 30 milliGRAM(s) Oral daily  senna 2 Tablet(s) Oral at bedtime    MEDICATIONS  (PRN):  hydrALAZINE Injectable 10 milliGRAM(s) IV Push every 2 hours PRN sbp >17mm/hg        PHYSICAL EXAM:  Vital Signs Last 24 Hrs  T(C): 36.9 (24 Dec 2022 05:16), Max: 36.9 (24 Dec 2022 05:16)  T(F): 98.5 (24 Dec 2022 05:16), Max: 98.5 (24 Dec 2022 05:16)  HR: 75 (24 Dec 2022 10:36) (75 - 85)  BP: 152/69 (24 Dec 2022 05:16) (130/59 - 152/69)  BP(mean): --  RR: 18 (24 Dec 2022 05:16) (17 - 19)  SpO2: 95% (24 Dec 2022 10:36) (93% - 98%)    Parameters below as of 24 Dec 2022 10:36  Patient On (Oxygen Delivery Method): nasal cannula,1 L        GENERAL: NAD, well-groomed, well-developed  HEAD:  Atraumatic, Normocephalic  EYES:  conjunctiva and sclera clear  ENT: Moist mucous membranes,  NECK: Supple, No JVD, no bruits  CHEST/LUNG: bibasal rales slight wheeze  HEART: irregular  No murmurs, rubs, or gallops PMI non displaced.  ABDOMEN: Soft, Nontender, Nondistended; Bowel sounds present  EXTREMITIES:  1-2+ pretib edema  SKIN: No rashes or lesions  NERVOUS SYSTEM:  Alert       TELEMETRY:  af controlled vr  ECG:  < from: 12 Lead ECG (22 @ 08:16) >    Ventricular Rate 78 BPM    Atrial Rate 267 BPM    QRS Duration 96 ms    Q-T Interval 418 ms    QTC Calculation(Bazett) 476 ms    R Axis -1 degrees    T Axis 7 degrees    Diagnosis Line Atrial fibrillation  Nonspecific ST abnormality    When compared with ECG of 17-DEC-2022 06:52,  T wave inversion no longer evident in Inferior leads  Nonspecific T wave abnormality, improved in Lateral leads  Confirmed by BELKYS WHITLOCK MD () on 2022 8:58:11 AM    < end of copied text >      LABS:                        12.6   13.74 )-----------( 210      ( 24 Dec 2022 06:00 )             37.6     12    134<L>  |  99  |  64<H>  ----------------------------<  119<H>  3.7   |  28  |  1.50<H>    Ca    8.4      24 Dec 2022 06:00  Phos  2.5     12-23  Mg     2.2                   Serum Pro-Brain Natriuretic Peptide: 3533 pg/mL (22 @ 06:35)    I&O's Summary    23 Dec 2022 07:  -  24 Dec 2022 07:00  --------------------------------------------------------  IN: 900 mL / OUT: 250 mL / NET: 650 mL    24 Dec 2022 07:  -  24 Dec 2022 13:38  --------------------------------------------------------  IN: 400 mL / OUT: 0 mL / NET: 400 mL    RADIOLOGY & ADDITIONAL STUDIES:  < from: Xray Chest 1 View- PORTABLE-Routine (Xray Chest 1 View- PORTABLE-Routine in AM.) (22 @ 10:32) >    ACC: [id number] EXAM:  XR CHEST PORTABLE ROUTINE 1V                          PROCEDURE DATE:  2022          INTERPRETATION:  AP chest on 2022 at 10:11 AM. There is   history of heart failure.    Gross heart enlargement again noted.    On  there is a very dense infiltrate in the anterior segment   of the right upper lobe the presently shows better aeration.    Left base process likely a mix of atelectasis and effusion is again noted.    IMPRESSION: As above.    --- End of Report ---            BISMARK ZAMUDIO MD; Attending Radiologist  This document has been electronically signed. Dec 23 2022  2:23PM    < end of copied text >      ECHO:  < from: TTE Echo Complete w/o Contrast w/ Doppler (22 @ 12:52) >    ACC: [id number] EXAM:  ECHO TTE WO CON COMP W DOPP                          PROCEDURE DATE:  2022          INTERPRETATION:  TRANSTHORACIC ECHOCARDIOGRAM REPORT        Patient Name:   RAD SINGLETON Patient Location: [address] Rec #:[id number]            Accession #:      [id number]  Account #:      [id number]             Height:           66.9 in 170.0 cm  YOB: 1934          Weight:           171.1 lb 77.60 kg  Patient Age:    88 years           BSA:              1.89 m²  Patient Gender: M                  BP:               168/77 mmHg      Date of Exam:        2022 12:52:55 PM  Sonographer:         GUILLERMO  Referring Physician: EL CHAPPELL    Procedure:     2D Echo/Doppler/Color Doppler Complete.  Indications:   Heart failure, unspecified - I50.9  Diagnosis:     Heart failure, unspecified - I50.9  Study Details: Technically fair study.        2D AND M-MODE MEASUREMENTS (normal ranges within parentheses):  Left Ventricle:                  Normal   Aorta/Left Atrium:               Normal  IVSd (2D):              1.22 cm (0.7-1.1) Aortic Root (Mmode): 2.83 cm   (2.4-3.7)  LVPWd (2D):             1.20 cm (0.7-1.1) AoV Cusp Separation: 1.89 cm   (1.5-2.6)  LVIDd (2D):             5.24 cm (3.4-5.7) Left Atrium (Mmode): 5.59 cm   (1.9-4.0)  LVIDs (2D):             4.04 cm  LV FS (2D):             22.9 %   (>25%)  Relative Wall Thickness  0.46    (<0.42)    SPECTRAL DOPPLER ANALYSIS (where applicable):  Mitral Valve:  MV Max Ron:   2.44 m/s  MV Mean Grad: 9.3 mmHg    Aortic Valve: AoV Max Ron: 1.65 m/s AoV Peak PG: 10.9 mmHg AoV Mean P.0 mmHg    LVOT Vmax: 1.31 m/s LVOT VTI: 0.284 m LVOT Diameter: 2.27 cm    AoV Area, Vmax: 3.21 cm² AoV Area, VTI: 2.90 cm² AoV Area, Vmn: 3.32 cm²  Ao VTI: 0.397  Tricuspid Valve and PA/RV Systolic Pressure: TR Max Velocity: 1.94 m/s RA   Pressure: 10 mmHg RVSP/PASP: 25.0 mmHg    Pulmonic Valve:  PV Max Velocity: 1.18 m/s PV Max P.6 mmHg PV Mean P.6 mmHg      PHYSICIAN INTERPRETATION:  Left Ventricle: The left ventricular internal cavity size is normal. Left   ventricular wall thickness is mildly increased. There is mild concentric   left ventricular hypertrophy involving the global wall. The LVH involves   global walls.  Global LV systolic function was mildly decreased. Left ventricular   ejection fraction, by visual estimation, is 45 to 50%. The left   ventricular diastolic function could not be assessed in this study.      LV Wall Scoring:  The apical septal segment and apex are dyskinetic. The basal and mid   inferior  septum is hypokinetic.    Right Ventricle: The right ventricular size is normal. TV S' 1.1 m/s.  Left Atrium: Moderately enlarged left atrium.  Right Atrium: Mildly enlarged right atrium.  Pericardium: A small pericardial effusion is present. The pericardial   effusion is globally located around the entire heart. There is a moderate   pleural effusion in the left lateral region.  Mitral Valve: Moderate thickening and calcification of the anterior and   posterior mitral valveleaflets. Moderate mitral valve regurgitation is   seen.  Tricuspid Valve: The tricuspid valve is normal in structure.   Mild-moderate tricuspid regurgitation is visualized.  Aortic Valve: The aortic valve is trileaflet. Trivial aortic valve   regurgitation is seen. The Dimesionless Index value is .715.  Pulmonic Valve: The pulmonic valve is normal. Mild pulmonic valve   regurgitation.  Aorta: The aortic root is normal in size and structure.  Pulmonary Artery: Pulmonary hypertension is absent.      Summary:   1. Biatrial enlargement   2. Left ventricular ejection fraction, by visual estimation, is 45 to   50%.   3. Mildly decreased global left ventricular systolic function.   4. There is mild concentric left ventricular hypertrophy.   5. Moderate mitral valve regurgitation.   6. Moderate thickening and calcification of the anterior and posterior   mitral valve leaflets.   7. Mild-moderate tricuspid regurgitation.   8. Mild pulmonic valve regurgitation.   9. Apical septal segment, apex, and basal and mid inferior septum are   abnormal as described above.    [id number] Belkys Whitlock , Electronically signed on   2022 at 2:16:03 PM          < end of copied text >

## 2022-12-25 LAB
ANION GAP SERPL CALC-SCNC: 9 MMOL/L — SIGNIFICANT CHANGE UP (ref 5–17)
APPEARANCE UR: CLEAR — SIGNIFICANT CHANGE UP
BILIRUB UR-MCNC: NEGATIVE — SIGNIFICANT CHANGE UP
BUN SERPL-MCNC: 58 MG/DL — HIGH (ref 7–23)
CALCIUM SERPL-MCNC: 8.8 MG/DL — SIGNIFICANT CHANGE UP (ref 8.4–10.5)
CHLORIDE SERPL-SCNC: 102 MMOL/L — SIGNIFICANT CHANGE UP (ref 96–108)
CO2 SERPL-SCNC: 29 MMOL/L — SIGNIFICANT CHANGE UP (ref 22–31)
COLOR SPEC: YELLOW — SIGNIFICANT CHANGE UP
CREAT SERPL-MCNC: 1.76 MG/DL — HIGH (ref 0.5–1.3)
DIFF PNL FLD: NEGATIVE — SIGNIFICANT CHANGE UP
EGFR: 37 ML/MIN/1.73M2 — LOW
GLUCOSE SERPL-MCNC: 124 MG/DL — HIGH (ref 70–99)
GLUCOSE UR QL: NEGATIVE — SIGNIFICANT CHANGE UP
HCT VFR BLD CALC: 38.3 % — LOW (ref 39–50)
HGB BLD-MCNC: 12.5 G/DL — LOW (ref 13–17)
KETONES UR-MCNC: NEGATIVE — SIGNIFICANT CHANGE UP
LEUKOCYTE ESTERASE UR-ACNC: NEGATIVE — SIGNIFICANT CHANGE UP
MCHC RBC-ENTMCNC: 31.4 PG — SIGNIFICANT CHANGE UP (ref 27–34)
MCHC RBC-ENTMCNC: 32.6 GM/DL — SIGNIFICANT CHANGE UP (ref 32–36)
MCV RBC AUTO: 96.2 FL — SIGNIFICANT CHANGE UP (ref 80–100)
NITRITE UR-MCNC: NEGATIVE — SIGNIFICANT CHANGE UP
NRBC # BLD: 0 /100 WBCS — SIGNIFICANT CHANGE UP (ref 0–0)
NT-PROBNP SERPL-SCNC: 2497 PG/ML — HIGH (ref 0–300)
PH UR: 5 — SIGNIFICANT CHANGE UP (ref 5–8)
PLATELET # BLD AUTO: 250 K/UL — SIGNIFICANT CHANGE UP (ref 150–400)
POTASSIUM SERPL-MCNC: 3.8 MMOL/L — SIGNIFICANT CHANGE UP (ref 3.5–5.3)
POTASSIUM SERPL-SCNC: 3.8 MMOL/L — SIGNIFICANT CHANGE UP (ref 3.5–5.3)
PROT UR-MCNC: 30 MG/DL
RBC # BLD: 3.98 M/UL — LOW (ref 4.2–5.8)
RBC # FLD: 13.6 % — SIGNIFICANT CHANGE UP (ref 10.3–14.5)
RBC CASTS # UR COMP ASSIST: NEGATIVE /HPF — SIGNIFICANT CHANGE UP (ref 0–4)
SODIUM SERPL-SCNC: 140 MMOL/L — SIGNIFICANT CHANGE UP (ref 135–145)
SP GR SPEC: 1.01 — SIGNIFICANT CHANGE UP (ref 1.01–1.02)
UROBILINOGEN FLD QL: NEGATIVE — SIGNIFICANT CHANGE UP
WBC # BLD: 13.9 K/UL — HIGH (ref 3.8–10.5)
WBC # FLD AUTO: 13.9 K/UL — HIGH (ref 3.8–10.5)
WBC UR QL: NEGATIVE /HPF — SIGNIFICANT CHANGE UP (ref 0–5)

## 2022-12-25 PROCEDURE — 76775 US EXAM ABDO BACK WALL LIM: CPT | Mod: 26

## 2022-12-25 PROCEDURE — 99232 SBSQ HOSP IP/OBS MODERATE 35: CPT

## 2022-12-25 PROCEDURE — 93970 EXTREMITY STUDY: CPT | Mod: 26

## 2022-12-25 RX ORDER — FUROSEMIDE 40 MG
40 TABLET ORAL ONCE
Refills: 0 | Status: COMPLETED | OUTPATIENT
Start: 2022-12-25 | End: 2022-12-25

## 2022-12-25 RX ORDER — FLUTICASONE PROPIONATE 50 MCG
1 SPRAY, SUSPENSION NASAL
Refills: 0 | Status: DISCONTINUED | OUTPATIENT
Start: 2022-12-25 | End: 2023-01-03

## 2022-12-25 RX ADMIN — Medication 30 MILLIGRAM(S): at 05:21

## 2022-12-25 RX ADMIN — Medication 3 MILLILITER(S): at 15:54

## 2022-12-25 RX ADMIN — Medication 1 SPRAY(S): at 18:21

## 2022-12-25 RX ADMIN — AMLODIPINE BESYLATE 10 MILLIGRAM(S): 2.5 TABLET ORAL at 05:21

## 2022-12-25 RX ADMIN — APIXABAN 2.5 MILLIGRAM(S): 2.5 TABLET, FILM COATED ORAL at 17:19

## 2022-12-25 RX ADMIN — Medication 600 MILLIGRAM(S): at 06:17

## 2022-12-25 RX ADMIN — Medication 1 SPRAY(S): at 05:21

## 2022-12-25 RX ADMIN — BUDESONIDE AND FORMOTEROL FUMARATE DIHYDRATE 2 PUFF(S): 160; 4.5 AEROSOL RESPIRATORY (INHALATION) at 09:53

## 2022-12-25 RX ADMIN — Medication 200 MILLIGRAM(S): at 05:21

## 2022-12-25 RX ADMIN — SENNA PLUS 2 TABLET(S): 8.6 TABLET ORAL at 21:23

## 2022-12-25 RX ADMIN — Medication 600 MILLIGRAM(S): at 17:17

## 2022-12-25 RX ADMIN — Medication 200 MILLIGRAM(S): at 14:58

## 2022-12-25 RX ADMIN — APIXABAN 2.5 MILLIGRAM(S): 2.5 TABLET, FILM COATED ORAL at 06:17

## 2022-12-25 RX ADMIN — Medication 81 MILLIGRAM(S): at 12:57

## 2022-12-25 RX ADMIN — Medication 50 MILLIGRAM(S): at 05:21

## 2022-12-25 RX ADMIN — Medication 25 MILLIGRAM(S): at 05:21

## 2022-12-25 RX ADMIN — Medication 40 MILLIGRAM(S): at 14:56

## 2022-12-25 RX ADMIN — Medication 200 MILLIGRAM(S): at 21:23

## 2022-12-25 RX ADMIN — Medication 25 MILLIGRAM(S): at 21:23

## 2022-12-25 RX ADMIN — Medication 25 MILLIGRAM(S): at 14:58

## 2022-12-25 RX ADMIN — Medication 1 SPRAY(S): at 21:23

## 2022-12-25 RX ADMIN — Medication 3 MILLILITER(S): at 09:53

## 2022-12-25 RX ADMIN — Medication 1 SPRAY(S): at 12:06

## 2022-12-25 RX ADMIN — Medication 1 SPRAY(S): at 17:19

## 2022-12-25 NOTE — PROGRESS NOTE ADULT - NS ATTEND AMEND GEN_ALL_CORE FT
88 year old male with a PMH of CVA, CHF, HTN, afib on eliquis, hip fx s/p fall and prior intubation for HF exacerbation presenting to [facility] ED for SOB and CP. Pt noted to be hypoxemic with elevated sbp to 200's, mild inc in troponin level thought to be from demand ischemia.  Patient admitted to the ICU with HTN urgency, flash pulmonary edema and acute HF exacerbation (midrange EF of 45-50%), found to be Influenza A positive. He was on nitro gtt for BP control and IV lasix for edema, then transitioned to PO anti htn medications. Pt was Placed on BiPAP. Pt has completed tamiflu. Pt is an ICU downgrade.   He is on a steroid taper.  Will wean O2 as able.   Pt still w/ an IRINEO, we are holding losartan, but gave a dose of lasix on 12/24 based on Dr. Buckley (Cardiology recommendations) as pt appeared hypervolemic. will d/w Dr. Buckley today about additional lasix dosing.  Palliative care was consulted when pt was in the ICU.
88 year old male with a PMH of CVA, CHF, HTN, afib on eliquis, hip fx s/p fall and prior intubation for HF exacerbation presenting to [facility] ED for SOB and CP. Pt noted to be hypoxemic with elevated sbp to 200's, mild inc in troponin level thought to be from demand ischemia.  Patient admitted to the ICU with HTN urgency, flash pulmonary edema and acute HF exacerbation (midrange EF of 45-50%), found to be Influenza A positive. He was on nitro gtt for BP control and IV lasix for edema, then transitioned to PO anti htn medications. Pt was Placed on BiPAP. Pt has completed tamiflu. Pt is an ICU downgrade.   He is on a steroid taper. He will start 30 mg prednisone today. Discussed w/ DR. Hernandez.  Will wean O2 as able. CXR today continues to show a left pleural effusion at the base. Right sided infiltrate mildly improved (personal read). May start abx for superimposed bacterial infection. Will f/u procalcitonin.  Pt still w/ an IRINEO today, continue to hold lasix and losartan - may consider renal input.   Palliative care was consulted when pt was in the ICU.
88 year old male with a PMH of CVA, CHF, HTN, afib on eliquis, hip fx s/p fall and prior intubation for HF exacerbation presenting to [facility] ED for SOB and CP. Pt noted to be hypoxemic with elevated sbp to 200's, mild inc in troponin level thought to be from demand ischemia.  Patient admitted to the ICU with HTN urgency, flash pulmonary edema and acute HF exacerbation (midrange EF of 45-50%), found to be Influenza A positive. He was on nitro gtt for BP control and IV lasix for edema, then transitioned to PO anti htn medications. Pt was Placed on BiPAP. Pt started on tamiflu, which he has Completed  today.   Pt is an ICU downgrade.   He is on steroids, will initiate a steroid taper.   Will wean O2 as able. CXR in the AM.   Pt noted to have IRINEO today, so lasix and losartan held.   Palliative care was consulted when pt was in the ICU.
88 year old male with a PMH of CVA, CHF, HTN, afib on eliquis, hip fx s/p fall and prior intubation for HF exacerbation presenting to [facility] ED for SOB and CP. Pt noted to be hypoxemic with elevated sbp to 200's, mild inc in troponin level thought to be from demand ischemia.  Patient admitted to the ICU with HTN urgency, flash pulmonary edema and acute HF exacerbation (midrange EF of 45-50%), found to be Influenza A positive. He was on nitro gtt for BP control and IV lasix for edema, then transitioned to PO anti htn medications. Pt was Placed on BiPAP. Pt has completed tamiflu. Pt is an ICU downgrade.   He is on a steroid taper.  Will wean O2 as able. CXR shows a left pleural effusion at the base. Right sided infiltrate mildly improved. will hold on abx. Patient clinically improving.  Pt still w/ an IRINEO, continue to hold lasix and losartan. May restart lasix tomorrow.  Palliative care was consulted when pt was in the ICU.

## 2022-12-25 NOTE — PROGRESS NOTE ADULT - ASSESSMENT
Physical Examination:  GENERAL:               Alert,  no acute distress.    HEENT:                    + JVD, Moist MM  PULM:                     Bilateral air entry, Clear to auscultation bilaterally, no significant sputum production, no Rales, No Rhonchi, ++ Wheezing  CVS:                         S1, S2,  +Murmur  ABD:                        Soft, nondistended, nontender, normoactive bowel sounds,   EXT:                         mild edema, nontender, No Cyanosis or Clubbing   NEURO:                  Alert, oriented, interactive, nonfocal, follows commands  PSYC:                      Calm, + Insight.        88 year old male with a PMH of CVA, CHF, HTN, afib on eliquis, hip fx s/p fall and prior intubation for HF exacerbation presenting to [facility] ED for SOB and CP. Pt noted to be hypoxemic with elevated sbp to 200's. Pt tx with NIV, lasix and nitro with +response. ICU team consulted. Influenza A + with mild elevated troponin, elevated bnp and ?IRINEO on labs. When we arrived pt was alert and oriented, with O2 sat 100%, rate controlled afib on monitor and sbp 140. Pt trial off bipap with NC ~4 liters without desaturation or respiratory distress. ABG obtained with the following results: 7.37 /  45 / 87 / 26 97% on 4 liters NC. Troponin mildly elevated and has stabilized with no major change and ekg is without acute ischemia noted currently appearing to be demand ischemia at this time (also he is on AC at home), cardiology contacted by ED MD. BNP elevated to 6k. Given pt appears comfortable and states that he is "feeling better" along with his neurological, respiratory (off NIV) and hemodynamic stability  he is not currently a candidate for ICU and is appropriate for monitoring on telemetry under medicine service. However, if any changes should occur please contact our team immediately. Thank you. Recommending the following:    Assessment  1. Influenza A  2. 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 Elevated trop, r/o NSTEMI  4. Underlying Afib on Eliquis, HTN, CVA,   5. Increasing Cr / IRINEO normal baseline    Plan   monitor on room air  add flonase and add  nasal saline spray   Continue Symbicort  continue Mucinex and tessalon for cough  check CXR in am  Oral diet  encouraged incentive spirometry   prednisone taper  continue  duoneb atc  ASA, A/c with eliquis  Cardio Follow up to optimize cardiac meds.     PMD:				                   Notified(Date):  Family Updated: 	DTR Lora 	                Date: 12/23/22   - updated on status     Sedation & Analgesia:	  Diet/Nutrition:		Diet, Minced and Moist:   Consistent Carbohydrate No Snacks  DASH/TLC Sodium & Cholesterol Restricted (12-18-22 @ 08:09) [Active]     GI PPx:			  senna 2 Tablet(s) Oral at bedtime     DVT Ppx:		  apixaban 5 milliGRAM(s) Oral two times a day  aspirin enteric coated 81 milliGRAM(s) Oral daily      Activity:		    Head of Bed:               35-45 Deg  Glycemic Control:              Lines: PIV    Restraints were deemed necessary to prevent removal of life-sustaining devices [  ] YES   [ x   ]  NO    Disposition: Continue care on medical floor , d/w Jenna Martinez    Goals of Care: Full code

## 2022-12-25 NOTE — PROGRESS NOTE ADULT - NS ATTEST RISK PROBLEM GEN_ALL_CORE FT
88 year old male with a PMH of CVA, CHF, HTN, afib on eliquis, hip fx s/p fall and prior intubation for HF exacerbation presenting to [facility] ED for SOB and CP. Pt noted to be hypoxemic with elevated sbp to 200's, mild inc in troponin level thought to be from demand ischemia.  Patient admitted to the ICU with HTN urgency, flash pulmonary edema and acute HF exacerbation (midrange EF of 45-50%), found to be Influenza A positive. He was on nitro gtt for BP control and IV lasix for edema, then transitioned to PO anti htn medications. Pt was Placed on BiPAP. Pt has completed tamiflu. Pt is an ICU downgrade.   He is on a steroid taper.  Will wean O2 as able.   Pt still w/ an IRINEO, we are holding losartan, but gave a dose of lasix on 12/24 based on Dr. Buckley (Cardiology recommendations) as pt appeared hypervolemic. will d/w Dr. Buckley today about additional lasix dosing.  Palliative care was consulted when pt was in the ICU.

## 2022-12-25 NOTE — PROGRESS NOTE ADULT - SUBJECTIVE AND OBJECTIVE BOX
Follow-up Pulmonary Progress Note  Chief Complaint : Heart failure      patient complain of dry nose and dry secretions  sat 95% on 1 L rest   requested to monitor on Room air      Allergies :No Known Allergies      PAST MEDICAL & SURGICAL HISTORY:  Afib    Congestive heart failure (CHF)    CVA (cerebral vascular accident)    Hypertension, unspecified type    No significant past surgical history        Medications:  MEDICATIONS  (STANDING):  albuterol/ipratropium for Nebulization 3 milliLiter(s) Nebulizer every 6 hours  amLODIPine   Tablet 10 milliGRAM(s) Oral daily  apixaban 2.5 milliGRAM(s) Oral every 12 hours  aspirin enteric coated 81 milliGRAM(s) Oral daily  benzonatate 200 milliGRAM(s) Oral three times a day  budesonide  80 MICROgram(s)/formoterol 4.5 MICROgram(s) Inhaler 2 Puff(s) Inhalation two times a day  guaiFENesin  milliGRAM(s) Oral every 12 hours  hydrALAZINE 25 milliGRAM(s) Oral every 8 hours  hydrocodone/homatropine Syrup 5 milliLiter(s) Oral at bedtime  metoprolol succinate ER 50 milliGRAM(s) Oral daily  senna 2 Tablet(s) Oral at bedtime  sodium chloride 0.65% Nasal 1 Spray(s) Both Nostrils every 6 hours    MEDICATIONS  (PRN):      Antibiotics History  oseltamivir 75 milliGRAM(s) Oral once, 12-18-22 @ 20:55, Stop order after: 1 Doses  oseltamivir 30 milliGRAM(s) Oral every 12 hours, 12-19-22 @ 09:00, Stop order after: 4 Days  oseltamivir 30 milliGRAM(s) Oral daily, 12-23-22 @ 00:00, Stop order after: 1 Doses      Heme Medications   apixaban 2.5 milliGRAM(s) Oral every 12 hours, 12-22-22 @ 13:59  aspirin enteric coated 81 milliGRAM(s) Oral daily, 12-17-22 @ 11:33      GI Medications  senna 2 Tablet(s) Oral at bedtime, 12-17-22 @ 11:34, Routine        LABS:                        12.5   13.90 )-----------( 250      ( 25 Dec 2022 05:45 )             38.3     12-25    140  |  102  |  58<H>  ----------------------------<  124<H>  3.8   |  29  |  1.76<H>    Ca    8.8      25 Dec 2022 05:45            VITALS:  T(C): 36.6 (12-25-22 @ 05:07), Max: 36.7 (12-24-22 @ 20:54)  T(F): 97.8 (12-25-22 @ 05:07), Max: 98.1 (12-24-22 @ 20:54)  HR: 88 (12-25-22 @ 05:07) (74 - 91)  BP: 131/73 (12-25-22 @ 05:07) (124/54 - 133/64)  BP(mean): --  ABP: --  ABP(mean): --  RR: 16 (12-25-22 @ 05:07) (16 - 18)  SpO2: 98% (12-25-22 @ 10:33) (95% - 98%)  CVP(mm Hg): --  CVP(cm H2O): --    Ins and Outs     12-24-22 @ 07:01  -  12-25-22 @ 07:00  --------------------------------------------------------  IN: 800 mL / OUT: 400 mL / NET: 400 mL                I&O's Detail    24 Dec 2022 07:01  -  25 Dec 2022 07:00  --------------------------------------------------------  IN:    Oral Fluid: 800 mL  Total IN: 800 mL    OUT:    Voided (mL): 400 mL  Total OUT: 400 mL    Total NET: 400 mL

## 2022-12-25 NOTE — CONSULT NOTE ADULT - SUBJECTIVE AND OBJECTIVE BOX
NEPHROLOGY CONSULTATION    CHIEF COMPLAINT: SOB    HPI:  Pt is 87 yo M w/PMH atrial fibrillation on eliquis, history of CVA, Hypertension presents for SOB. Patient is a poor historian. Noted to have worsening renal fx.     ROS:  as above    Allergies:  No Known Allergies    PAST MEDICAL & SURGICAL HISTORY:  Afib  Congestive heart failure (CHF)  CVA (cerebral vascular accident)  Hypertension, unspecified type    SOCIAL HISTORY:  negative    FAMILY HISTORY:  No pertinent family history in first degree relatives    MEDICATIONS  (STANDING):  albuterol/ipratropium for Nebulization 3 milliLiter(s) Nebulizer every 6 hours  amLODIPine   Tablet 10 milliGRAM(s) Oral daily  apixaban 2.5 milliGRAM(s) Oral every 12 hours  aspirin enteric coated 81 milliGRAM(s) Oral daily  benzonatate 200 milliGRAM(s) Oral three times a day  budesonide  80 MICROgram(s)/formoterol 4.5 MICROgram(s) Inhaler 2 Puff(s) Inhalation two times a day  guaiFENesin  milliGRAM(s) Oral every 12 hours  hydrALAZINE 25 milliGRAM(s) Oral every 8 hours  hydrocodone/homatropine Syrup 5 milliLiter(s) Oral at bedtime  metoprolol succinate ER 50 milliGRAM(s) Oral daily  senna 2 Tablet(s) Oral at bedtime  sodium chloride 0.65% Nasal 1 Spray(s) Both Nostrils every 6 hours    Vital Signs Last 24 Hrs  T(C): 36.6 (12-25-22 @ 05:07), Max: 36.7 (12-24-22 @ 20:54)  T(F): 97.8 (12-25-22 @ 05:07), Max: 98.1 (12-24-22 @ 20:54)  HR: 88 (12-25-22 @ 05:07) (74 - 91)  BP: 131/73 (12-25-22 @ 05:07) (124/54 - 133/64)  RR: 16 (12-25-22 @ 05:07) (16 - 18)  SpO2: 95% (12-25-22 @ 05:07) (95% - 96%)    I&O's Detail    24 Dec 2022 07:01  -  25 Dec 2022 07:00  --------------------------------------------------------  IN:    Oral Fluid: 800 mL  Total IN: 800 mL    OUT:    Voided (mL): 400 mL  Total OUT: 400 mL    Total NET: 400 mL    LABS:                        12.5   13.90 )-----------( 250      ( 25 Dec 2022 05:45 )             38.3     12-25    140  |  102  |  58<H>  ----------------------------<  124<H>  3.8   |  29  |  1.76<H>    Ca    8.8      25 Dec 2022 05:45    A/P:    full consult to follow    [phone]   NEPHROLOGY CONSULTATION    CHIEF COMPLAINT: SOB    HPI:  Pt is 87 yo M w/PMH atrial fibrillation on eliquis, history of CVA, hypertension presents for SOB. Patient is a poor historian. Dx w/Flu A. Noted to have worsening renal fx. No CP, N/V/D/C.    ROS:  as above    Allergies:  No Known Allergies    PAST MEDICAL & SURGICAL HISTORY:  Afib  Congestive heart failure (CHF)  CVA (cerebral vascular accident)  Hypertension, unspecified type    SOCIAL HISTORY:  negative    FAMILY HISTORY:  No pertinent family history in first degree relatives    MEDICATIONS  (STANDING):  albuterol/ipratropium for Nebulization 3 milliLiter(s) Nebulizer every 6 hours  amLODIPine   Tablet 10 milliGRAM(s) Oral daily  apixaban 2.5 milliGRAM(s) Oral every 12 hours  aspirin enteric coated 81 milliGRAM(s) Oral daily  benzonatate 200 milliGRAM(s) Oral three times a day  budesonide  80 MICROgram(s)/formoterol 4.5 MICROgram(s) Inhaler 2 Puff(s) Inhalation two times a day  guaiFENesin  milliGRAM(s) Oral every 12 hours  hydrALAZINE 25 milliGRAM(s) Oral every 8 hours  hydrocodone/homatropine Syrup 5 milliLiter(s) Oral at bedtime  metoprolol succinate ER 50 milliGRAM(s) Oral daily  senna 2 Tablet(s) Oral at bedtime  sodium chloride 0.65% Nasal 1 Spray(s) Both Nostrils every 6 hours    Vital Signs Last 24 Hrs  T(C): 36.6 (12-25-22 @ 05:07), Max: 36.7 (12-24-22 @ 20:54)  T(F): 97.8 (12-25-22 @ 05:07), Max: 98.1 (12-24-22 @ 20:54)  HR: 88 (12-25-22 @ 05:07) (74 - 91)  BP: 131/73 (12-25-22 @ 05:07) (124/54 - 133/64)  RR: 16 (12-25-22 @ 05:07) (16 - 18)  SpO2: 95% (12-25-22 @ 05:07) (95% - 96%)    I&O's Detail    24 Dec 2022 07:01  -  25 Dec 2022 07:00  --------------------------------------------------------  IN:    Oral Fluid: 800 mL  Total IN: 800 mL    OUT:    Voided (mL): 400 mL  Total OUT: 400 mL    Total NET: 400 mL    s1s2  b/l air entry  soft, ND  + edema, stasis changes    LABS:                        12.5   13.90 )-----------( 250      ( 25 Dec 2022 05:45 )             38.3     12-25    140  |  102  |  58<H>  ----------------------------<  124<H>  3.8   |  29  |  1.76<H>    Ca    8.8      25 Dec 2022 05:45    A/P:    Flu A  CM, mod MR, TR, EF 45-50%  Hemodynamic IRINEO/CKD 3 (Cr 1.16 - 3/10/22)  Will check UA, renal SONO  Avoid nephrotoxins  Ok to hold diuretics today, but may need to restart soon  BMP in am  Will follow     [phone]

## 2022-12-25 NOTE — PROGRESS NOTE ADULT - ASSESSMENT
88 year old male with a PMH of CVA, CHF, HTN, afib on eliquis, hip fx s/p fall and prior intubation for HF exacerbation presenting to [facility] ED for SOB and CP. Pt noted to be hypoxemic with elevated sbp to 200's. Pt tx with NIV, lasix and nitro with +response. Pt downgraded from ICU.     Acute hypoxic respiratory failure secondary to  Influenza A  Nasal congestion  currently weaned down to 1L NC, will cont to wean as able.  completed five days of tamiflu  pulm started symbicort, nasal saline spray, continue steroid taper and Duonebs ATC  started tesssalon perles atc, hycodan and robitussin prn for cough  physical therapy recommends outpatient PT when cleared    Acute Pulmonary Edema  Hypertensive Urgency with Acute on Chronic Combined CHF  Hold lasix and ARB due to renal function  continue hydralazine 25 q8, metoprolol succ 50 daily and amlodipine 10 daily  continue to monitor BP as worsening renal function  cardiac follow up to optimize meds  monitor volume status, daily weights, I/Os, replete lytes as needed  repeat CXR on 12/23 revealed left base process likely mix of atelectasis and effusion, RUL dense infiltrate showed better aeration  - D/w Dr. Buckley who recommended one dose of lasix on 12/24... limited ability to do IV lasix d/ timpaired kidney function.    Leukocytosis  - likely due to steroids, no recent fevers, O2 requirement improving daily from 2-->1 L. will monitor clinical status closely and start abx if needed.    Elevated Troponin  NSTEMI, likely type II  cardiology appreciated  cont medical management, cont asa, eliquis, bb    IRINEO on CKD 3  stopped lasix, hold ARB  avoid nephrotoxins  renally adjusted meds  monitor BMP  may involve renal if does not improve.    Chronic Atrial Fibrillation  continue metoprolol for rate control, eliquis for stroke ppx    History of CVA  continue ASA    DVT PPx  eliquis adjusted to 2.5 BID due to worsening renal status    Full code  palliative following    patient prefers to update family on his own 88 year old male with a PMH of CVA, CHF, HTN, afib on eliquis, hip fx s/p fall and prior intubation for HF exacerbation presenting to [facility] ED for SOB and CP. Pt noted to be hypoxemic with elevated sbp to 200's. Pt tx with NIV, lasix and nitro with +response. Pt downgraded from ICU.     Acute hypoxic respiratory failure secondary to  Influenza A  Nasal congestion  currently weaned down to 1L NC, will cont to wean as able.  completed five days of tamiflu  pulm started symbicort, nasal saline spray, continue steroid taper and Duonebs ATC  started tesssalon perles atc, hycodan and robitussin prn for cough  physical therapy recommends outpatient PT when cleared    Acute Pulmonary Edema  Hypertensive Urgency with Acute on Chronic Combined CHF  Hold lasix and ARB due to renal function  continue hydralazine 25 q8, metoprolol succ 50 daily and amlodipine 10 daily  continue to monitor BP as worsening renal function  cardiac follow up to optimize meds  monitor volume status, daily weights, I/Os, replete lytes as needed  repeat CXR on 12/23 revealed left base process likely mix of atelectasis and effusion, RUL dense infiltrate showed better aeration  - D/w Dr. Buckley who recommended one dose of lasix on 12/24... limited ability to do IV lasix due to impaired kidney function.    Leukocytosis  - likely due to steroids, no recent fevers, O2 requirement improving daily from 2-->1 L. will monitor clinical status closely and start abx if needed.    Elevated Troponin  NSTEMI, likely type II  cardiology appreciated  cont medical management, cont asa, eliquis, bb    IRINEO on CKD 3  stopped lasix, hold ARB  avoid nephrotoxins  renally adjusted meds  monitor BMP  consulted Dr. Pineda from Renal today.    Chronic Atrial Fibrillation  continue metoprolol for rate control, eliquis for stroke ppx    History of CVA  continue ASA    DVT PPx  eliquis adjusted to 2.5 BID due to worsening renal status    Full code  palliative following    patient prefers to update family on his own 88 year old male with a PMH of CVA, CHF, HTN, afib on eliquis, hip fx s/p fall and prior intubation for HF exacerbation presenting to [facility] ED for SOB and CP. Pt noted to be hypoxemic with elevated sbp to 200's. Pt tx with NIV, lasix and nitro with +response. Pt downgraded from ICU.     Acute hypoxic respiratory failure secondary to  Influenza A  Nasal congestion  currently weaned down to 1L NC, will cont to wean as able.  completed five days of tamiflu  pulm started symbicort, nasal saline spray, continue steroid taper and Duonebs ATC  started tessalon perles atc, hycodan and robitussin prn for cough  physical therapy recommends outpatient PT when cleared    Acute Pulmonary Edema  Hypertensive Urgency with Acute on Chronic Combined CHF  Hold lasix and ARB due to renal function  continue hydralazine 25 q8, metoprolol succ 50 daily and amlodipine 10 daily  continue to monitor BP as worsening renal function  cardiac follow up to optimize meds  monitor volume status, daily weights, I/Os, replete lytes as needed  repeat CXR on 12/23 revealed left base process likely mix of atelectasis and effusion, RUL dense infiltrate showed better aeration  - D/w Dr. Buckley who recommended one dose of lasix on 12/24... limited ability to do IV lasix due to impaired kidney function.    Leukocytosis  - likely due to steroids, no recent fevers, O2 requirement improving daily from 2-->1 L. will monitor clinical status closely and start abx if needed.    Elevated Troponin  NSTEMI, likely type II  cardiology appreciated  cont medical management, cont asa, eliquis, bb    IRINEO on CKD 3  stopped lasix, hold ARB  avoid nephrotoxins  renally adjusted meds  monitor BMP  consulted Dr. Pineda from Renal today.    Chronic Atrial Fibrillation  continue metoprolol for rate control, eliquis for stroke ppx    History of CVA  continue ASA    DVT PPx  eliquis adjusted to 2.5 BID due to worsening renal status    Full code  palliative following    patient prefers to update family on his own  ** Needs a new PT re-evaluation 88 year old male with a PMH of CVA, CHF, HTN, afib on eliquis, hip fx s/p fall and prior intubation for HF exacerbation presenting to [facility] ED for SOB and CP. Pt noted to be hypoxemic with elevated sbp to 200's. Pt tx with NIV, lasix and nitro with +response. Pt downgraded from ICU.     Acute hypoxic respiratory failure secondary to  Influenza A  Nasal congestion  currently weaned down to 1L NC, will cont to wean as able.  completed five days of tamiflu  pulm started symbicort, nasal saline spray, continue steroid taper and Duonebs ATC  started tessalon perles atc, hycodan and robitussin prn for cough  physical therapy recommends outpatient PT when cleared    Acute Pulmonary Edema  Hypertensive Urgency with Acute on Chronic Combined CHF  Hold ARB due to renal function, redose IV lasix. d/w Dr. Buckley and Dr. Pineda.  continue hydralazine 25 q8, metoprolol succ 50 daily and amlodipine 10 daily  continue to monitor BP as worsening renal function  cardiac follow up to optimize meds  monitor volume status, daily weights, I/Os, replete lytes as needed  repeat CXR on 12/23 revealed left base process likely mix of atelectasis and effusion, RUL dense infiltrate showed better aeration  - D/w Dr. Buckley who recommended one dose of lasix on 12/24... limited ability to do IV lasix due to impaired kidney function.    Leukocytosis  - likely due to steroids, no recent fevers, O2 requirement improving daily from 2-->1 L. will monitor clinical status closely and start abx if needed.    Elevated Troponin  NSTEMI, likely type II  cardiology appreciated  cont medical management, cont asa, eliquis, bb    IRINEO on CKD 3  hold ARB.  avoid nephrotoxins  renally adjusted meds  monitor BMP  consulted Dr. Pineda from Renal today.    Chronic Atrial Fibrillation  continue metoprolol for rate control, eliquis for stroke ppx    History of CVA  continue ASA    DVT PPx  eliquis adjusted to 2.5 BID due to worsening renal status    Full code  palliative following    patient prefers to update family on his own  ** Needs a new PT re-evaluation

## 2022-12-26 LAB
ANION GAP SERPL CALC-SCNC: 11 MMOL/L — SIGNIFICANT CHANGE UP (ref 5–17)
BASOPHILS # BLD AUTO: 0 K/UL — SIGNIFICANT CHANGE UP (ref 0–0.2)
BASOPHILS NFR BLD AUTO: 0 % — SIGNIFICANT CHANGE UP (ref 0–2)
BUN SERPL-MCNC: 63 MG/DL — HIGH (ref 7–23)
CALCIUM SERPL-MCNC: 9.2 MG/DL — SIGNIFICANT CHANGE UP (ref 8.4–10.5)
CHLORIDE SERPL-SCNC: 101 MMOL/L — SIGNIFICANT CHANGE UP (ref 96–108)
CO2 SERPL-SCNC: 29 MMOL/L — SIGNIFICANT CHANGE UP (ref 22–31)
CREAT SERPL-MCNC: 1.53 MG/DL — HIGH (ref 0.5–1.3)
EGFR: 43 ML/MIN/1.73M2 — LOW
EOSINOPHIL # BLD AUTO: 0 K/UL — SIGNIFICANT CHANGE UP (ref 0–0.5)
EOSINOPHIL NFR BLD AUTO: 0 % — SIGNIFICANT CHANGE UP (ref 0–6)
GLUCOSE SERPL-MCNC: 101 MG/DL — HIGH (ref 70–99)
HCT VFR BLD CALC: 37.9 % — LOW (ref 39–50)
HGB BLD-MCNC: 12.2 G/DL — LOW (ref 13–17)
LYMPHOCYTES # BLD AUTO: 1.09 K/UL — SIGNIFICANT CHANGE UP (ref 1–3.3)
LYMPHOCYTES # BLD AUTO: 7 % — LOW (ref 13–44)
MANUAL SMEAR VERIFICATION: SIGNIFICANT CHANGE UP
MCHC RBC-ENTMCNC: 31.9 PG — SIGNIFICANT CHANGE UP (ref 27–34)
MCHC RBC-ENTMCNC: 32.2 GM/DL — SIGNIFICANT CHANGE UP (ref 32–36)
MCV RBC AUTO: 99.2 FL — SIGNIFICANT CHANGE UP (ref 80–100)
METAMYELOCYTES # FLD: 1 % — HIGH (ref 0–0)
MONOCYTES # BLD AUTO: 2.33 K/UL — HIGH (ref 0–0.9)
MONOCYTES NFR BLD AUTO: 15 % — HIGH (ref 2–14)
MYELOCYTES NFR BLD: 2 % — HIGH (ref 0–0)
NEUTROPHILS # BLD AUTO: 10.88 K/UL — HIGH (ref 1.8–7.4)
NEUTROPHILS NFR BLD AUTO: 69 % — SIGNIFICANT CHANGE UP (ref 43–77)
NEUTS BAND # BLD: 1 % — SIGNIFICANT CHANGE UP (ref 0–8)
NRBC # BLD: 0 /100 WBCS — SIGNIFICANT CHANGE UP (ref 0–0)
NRBC # BLD: 0 /100 — SIGNIFICANT CHANGE UP (ref 0–0)
NT-PROBNP SERPL-SCNC: 3188 PG/ML — HIGH (ref 0–300)
PLAT MORPH BLD: NORMAL — SIGNIFICANT CHANGE UP
PLATELET # BLD AUTO: 265 K/UL — SIGNIFICANT CHANGE UP (ref 150–400)
POTASSIUM SERPL-MCNC: 3.8 MMOL/L — SIGNIFICANT CHANGE UP (ref 3.5–5.3)
POTASSIUM SERPL-SCNC: 3.8 MMOL/L — SIGNIFICANT CHANGE UP (ref 3.5–5.3)
PROCALCITONIN SERPL-MCNC: 0.2 NG/ML — HIGH
PROMYELOCYTES # FLD: 1 % — HIGH (ref 0–0)
RBC # BLD: 3.82 M/UL — LOW (ref 4.2–5.8)
RBC # FLD: 13.8 % — SIGNIFICANT CHANGE UP (ref 10.3–14.5)
RBC BLD AUTO: NORMAL — SIGNIFICANT CHANGE UP
SODIUM SERPL-SCNC: 141 MMOL/L — SIGNIFICANT CHANGE UP (ref 135–145)
URATE SERPL-MCNC: 10.7 MG/DL — HIGH (ref 3.4–8.8)
VARIANT LYMPHS # BLD: 4 % — SIGNIFICANT CHANGE UP (ref 0–6)
WBC # BLD: 15.54 K/UL — HIGH (ref 3.8–10.5)
WBC # FLD AUTO: 15.54 K/UL — HIGH (ref 3.8–10.5)

## 2022-12-26 PROCEDURE — 71045 X-RAY EXAM CHEST 1 VIEW: CPT | Mod: 26

## 2022-12-26 PROCEDURE — 99232 SBSQ HOSP IP/OBS MODERATE 35: CPT

## 2022-12-26 RX ORDER — BUDESONIDE AND FORMOTEROL FUMARATE DIHYDRATE 160; 4.5 UG/1; UG/1
2 AEROSOL RESPIRATORY (INHALATION)
Refills: 0 | Status: DISCONTINUED | OUTPATIENT
Start: 2022-12-26 | End: 2023-01-03

## 2022-12-26 RX ORDER — FUROSEMIDE 40 MG
40 TABLET ORAL ONCE
Refills: 0 | Status: COMPLETED | OUTPATIENT
Start: 2022-12-26 | End: 2022-12-26

## 2022-12-26 RX ORDER — ALLOPURINOL 300 MG
100 TABLET ORAL DAILY
Refills: 0 | Status: DISCONTINUED | OUTPATIENT
Start: 2022-12-26 | End: 2023-01-03

## 2022-12-26 RX ADMIN — Medication 50 MILLIGRAM(S): at 05:40

## 2022-12-26 RX ADMIN — Medication 600 MILLIGRAM(S): at 18:34

## 2022-12-26 RX ADMIN — Medication 600 MILLIGRAM(S): at 05:41

## 2022-12-26 RX ADMIN — Medication 25 MILLIGRAM(S): at 13:17

## 2022-12-26 RX ADMIN — Medication 200 MILLIGRAM(S): at 21:42

## 2022-12-26 RX ADMIN — SENNA PLUS 2 TABLET(S): 8.6 TABLET ORAL at 21:42

## 2022-12-26 RX ADMIN — Medication 3 MILLILITER(S): at 20:27

## 2022-12-26 RX ADMIN — Medication 25 MILLIGRAM(S): at 21:42

## 2022-12-26 RX ADMIN — Medication 1 SPRAY(S): at 05:40

## 2022-12-26 RX ADMIN — Medication 100 MILLIGRAM(S): at 13:09

## 2022-12-26 RX ADMIN — Medication 1 SPRAY(S): at 13:11

## 2022-12-26 RX ADMIN — Medication 1 SPRAY(S): at 18:37

## 2022-12-26 RX ADMIN — Medication 1 SPRAY(S): at 05:43

## 2022-12-26 RX ADMIN — APIXABAN 2.5 MILLIGRAM(S): 2.5 TABLET, FILM COATED ORAL at 18:34

## 2022-12-26 RX ADMIN — Medication 40 MILLIGRAM(S): at 13:18

## 2022-12-26 RX ADMIN — Medication 1 SPRAY(S): at 21:43

## 2022-12-26 RX ADMIN — BUDESONIDE AND FORMOTEROL FUMARATE DIHYDRATE 2 PUFF(S): 160; 4.5 AEROSOL RESPIRATORY (INHALATION) at 20:27

## 2022-12-26 RX ADMIN — Medication 3 MILLILITER(S): at 09:45

## 2022-12-26 RX ADMIN — Medication 200 MILLIGRAM(S): at 13:16

## 2022-12-26 RX ADMIN — APIXABAN 2.5 MILLIGRAM(S): 2.5 TABLET, FILM COATED ORAL at 05:40

## 2022-12-26 RX ADMIN — Medication 20 MILLIGRAM(S): at 05:41

## 2022-12-26 RX ADMIN — Medication 25 MILLIGRAM(S): at 05:41

## 2022-12-26 RX ADMIN — Medication 200 MILLIGRAM(S): at 05:40

## 2022-12-26 RX ADMIN — BUDESONIDE AND FORMOTEROL FUMARATE DIHYDRATE 2 PUFF(S): 160; 4.5 AEROSOL RESPIRATORY (INHALATION) at 09:46

## 2022-12-26 NOTE — PROGRESS NOTE ADULT - ASSESSMENT
88 year old male with a PMH of CVA, CHF, HTN, afib on eliquis, hip fx s/p fall and prior intubation for HF exacerbation presenting to [facility] ED for SOB and CP. Pt noted to be hypoxemic with elevated sbp to 200's. Pt tx with NIV, lasix and nitro with +response. Pt downgraded from ICU.     Acute hypoxic respiratory failure secondary to  Influenza A  Nasal congestion  currently weaned down to 1L NC, will cont to wean as able.  completed five days of tamiflu  pulm started symbicort, nasal saline spray, continue steroid taper and Duonebs ATC  started tessalon perles atc, hycodan and robitussin prn for cough  physical therapy recommends outpatient PT when cleared    Acute Pulmonary Edema  Hypertensive Urgency with Acute on Chronic Combined CHF  Hold ARB due to renal function, redose IV lasix. d/w Dr. Buckley and Dr. Pineda.  continue hydralazine 25 q8, metoprolol succ 50 daily and amlodipine 10 daily  continue to monitor BP as worsening renal function  cardiac follow up to optimize meds  monitor volume status, daily weights, I/Os, replete lytes as needed  repeat CXR on 12/23 revealed left base process likely mix of atelectasis and effusion, RUL dense infiltrate showed better aeration  - D/w Dr. Buckley who recommended one dose of lasix on 12/24... limited ability to do IV lasix due to impaired kidney function.    Leukocytosis  - likely due to steroids, no recent fevers, O2 requirement improving daily from 2-->1 L. will monitor clinical status closely and start abx if needed.    Elevated Troponin  NSTEMI, likely type II  cardiology appreciated  cont medical management, cont asa, eliquis, bb    IRINEO on CKD 3  hold ARB.  avoid nephrotoxins  renally adjusted meds  monitor BMP  consulted Dr. Pineda from Renal today.    Chronic Atrial Fibrillation  continue metoprolol for rate control, eliquis for stroke ppx    History of CVA  continue ASA    DVT PPx  eliquis adjusted to 2.5 BID due to worsening renal status    Full code  palliative following    patient prefers to update family on his own  ** Needs a new PT re-evaluation 88 year old male with a PMH of CVA, CHF, HTN, afib on eliquis, hip fx s/p fall and prior intubation for HF exacerbation presenting to [facility] ED for SOB and CP. Pt noted to be hypoxemic with elevated sbp to 200's, mild inc in troponin level thought to be from demand ischemia.  Patient admitted to the ICU with acute hypoxic respiratory failure, HTN urgency, flash pulmonary edema and acute HF exacerbation (midrange EF of 45-50%), found to be Influenza A positive, s/p tamiflu. He was initially on BiPAP, nitro gtt for BP control and IV lasix, then transitioned to PO medications. Pt downgraded to the floor when tolerating NC. He is on a steroid taper.  Palliative care consulted when pt was in ICU.    Acute hypoxic respiratory failure secondary to  Influenza A  Nasal congestion  currently weaned down to 1L NC, will cont to wean as able.  completed five days of tamiflu  pulm started symbicort, nasal saline spray, continue steroid taper and Duonebs ATC  started tessalon perles atc, hycodan and robitussin prn for cough  physical therapy recommends outpatient PT when cleared - will need a new PT re-eval    Acute Pulmonary Edema  Hypertensive Urgency with Acute on Chronic Combined CHF  Hold ARB due to renal function, redose IV lasix. d/w Dr. Buckley and Dr. Pineda.  continue hydralazine 25 q8, metoprolol succ 50 daily and amlodipine 10 daily  continue to monitor BP as worsening renal function  cardiac follow up to optimize meds  monitor volume status, daily weights, I/Os, replete lytes as needed  repeat CXR on 12/23 revealed left base process likely mix of atelectasis and effusion, RUL dense infiltrate improving  - re-dosing 40 mg IV lasix today, 12/26    Leukocytosis  - likely due to steroids, no recent fevers, O2 requirement improving daily and now on room air. will monitor clinical status closely and start abx if needed.  - will f/u differential.     Epistaxis  - discontinue nasal cannula - pt keeps putting it on for "comfort", but dry air is causing nose bleeding. Epistaxis has resolved w/ pressure.  - will give afrin if it recurs.    Elevated Troponin  NSTEMI, likely type II  cardiology appreciated  cont medical management, cont asa, eliquis, bb    IRINEO on CKD 3  hold ARB.  avoid nephrotoxins  renally adjusted meds  monitor BMP  appreciate Renal consult.     Chronic Atrial Fibrillation  continue metoprolol for rate control, eliquis for stroke ppx    History of CVA  continue ASA    DVT PPx  eliquis adjusted to 2.5 BID for renal fxn    Full code  palliative following    patient prefers to update family on his own  ** Needs a new PT re-evaluation

## 2022-12-26 NOTE — PROGRESS NOTE ADULT - SUBJECTIVE AND OBJECTIVE BOX
Comfortable on RA    Vital Signs Last 24 Hrs  T(C): 36.3 (22 @ 16:28), Max: 36.8 (22 @ 19:55)  T(F): 97.4 (22 @ 16:28), Max: 98.3 (22 @ 19:55)  HR: 67 (22 @ 16:28) (67 - 84)  BP: 134/65 (22 @ 16:28) (129/60 - 149/66)  RR: 16 (22 @ 16:28) (16 - 16)  SpO2: 94% (22 @ 16:28) (94% - 96%)    I&O's Detail    25 Dec 2022 07:01  -  26 Dec 2022 07:00  --------------------------------------------------------  OUT:    Voided (mL): 400 mL  Total OUT: 400 mL    s1s2  b/l air entry  soft, ND  + edema, stasis changes                        12.2   15.54 )-----------( 265      ( 26 Dec 2022 05:00 )             37.9     26 Dec 2022 05:00    141    |  101    |  63     ----------------------------<  101    3.8     |  29     |  1.53     Ca    9.2        26 Dec 2022 05:00    Urinalysis Basic - ( 25 Dec 2022 22:09 )    Color: Yellow / Appearance: Clear / S.015 / pH: x  Gluc: x / Ketone: Negative  / Bili: Negative / Urobili: Negative   Blood: x / Protein: 30 mg/dL / Nitrite: Negative   Leuk Esterase: Negative / RBC: Negative /HPF / WBC Negative /HPF   Sq Epi: x / Non Sq Epi: x / Bacteria: x    albuterol/ipratropium for Nebulization 3 milliLiter(s) Nebulizer every 6 hours  allopurinol 100 milliGRAM(s) Oral daily  apixaban 2.5 milliGRAM(s) Oral every 12 hours  aspirin enteric coated 81 milliGRAM(s) Oral daily  benzonatate 200 milliGRAM(s) Oral three times a day  budesonide 160 MICROgram(s)/formoterol 4.5 MICROgram(s) Inhaler 2 Puff(s) Inhalation two times a day  fluticasone propionate 50 MICROgram(s)/spray Nasal Spray 1 Spray(s) Both Nostrils two times a day  guaiFENesin  milliGRAM(s) Oral every 12 hours  hydrALAZINE 25 milliGRAM(s) Oral every 8 hours  hydrocodone/homatropine Syrup 5 milliLiter(s) Oral at bedtime  metoprolol succinate ER 50 milliGRAM(s) Oral daily  predniSONE   Tablet 20 milliGRAM(s) Oral daily  senna 2 Tablet(s) Oral at bedtime  sodium chloride 0.65% Nasal 1 Spray(s) Both Nostrils every 6 hours    A/P:    Flu A  CM, mod MR, TR, EF 45-50%  Hemodynamic IRINEO/CKD 3 (Cr 1.16 - 3/10/22)  UA bland  Renal SONO w/o hydro  Stable renal fx  No objection to IV Lasix  F/u BMP     [phone]

## 2022-12-26 NOTE — PROGRESS NOTE ADULT - SUBJECTIVE AND OBJECTIVE BOX
Patient is a 88y old  Male who presents with a chief complaint of shortness of breath (25 Dec 2022 13:00)      24 HOUR EVENTS:  No overnight events reported.     SUBJECTIVE:  Patient seen and examined at bedside.     ALLERGIES:  No Known Allergies    MEDICATIONS  (STANDING):  albuterol/ipratropium for Nebulization 3 milliLiter(s) Nebulizer every 6 hours  allopurinol 100 milliGRAM(s) Oral daily  apixaban 2.5 milliGRAM(s) Oral every 12 hours  aspirin enteric coated 81 milliGRAM(s) Oral daily  benzonatate 200 milliGRAM(s) Oral three times a day  budesonide  80 MICROgram(s)/formoterol 4.5 MICROgram(s) Inhaler 2 Puff(s) Inhalation two times a day  fluticasone propionate 50 MICROgram(s)/spray Nasal Spray 1 Spray(s) Both Nostrils two times a day  furosemide   Injectable 40 milliGRAM(s) IV Push once  guaiFENesin  milliGRAM(s) Oral every 12 hours  hydrALAZINE 25 milliGRAM(s) Oral every 8 hours  hydrocodone/homatropine Syrup 5 milliLiter(s) Oral at bedtime  metoprolol succinate ER 50 milliGRAM(s) Oral daily  predniSONE   Tablet 20 milliGRAM(s) Oral daily  senna 2 Tablet(s) Oral at bedtime  sodium chloride 0.65% Nasal 1 Spray(s) Both Nostrils every 6 hours    MEDICATIONS  (PRN):    Vital Signs Last 24 Hrs  T(F): 97.7 (26 Dec 2022 05:26), Max: 98.3 (25 Dec 2022 19:55)  HR: 80 (26 Dec 2022 09:46) (78 - 84)  BP: 149/66 (26 Dec 2022 05:26) (129/60 - 150/72)  RR: 16 (26 Dec 2022 05:26) (16 - 16)  SpO2: 94% (26 Dec 2022 09:46) (91% - 96%)  I&O's Summary    25 Dec 2022 07:01  -  26 Dec 2022 07:00  --------------------------------------------------------  IN: 0 mL / OUT: 400 mL / NET: -400 mL      PHYSICAL EXAM:  General: NAD, Awake and alert  ENT: Moist mucous membranes, no thrush  Neck: Supple, No JVD  Lungs: Clear to auscultation bilaterally, good air entry, non-labored breathing  Cardio: RRR, S1/S2, No murmur  Abdomen: Soft, Nontender, Nondistended; Bowel sounds present  Extremities: No calf tenderness, No pitting edema, no contractures.    LABS:                        12.2   15.54 )-----------( 265      ( 26 Dec 2022 05:00 )             37.9         141  |  101  |  63  ----------------------------<  101  3.8   |  29  |  1.53    Ca    9.2      26 Dec 2022 05:00      Procalcitonin, Serum: 0.20 ng/mL (22 @ 05:00)  Procalcitonin, Serum: 0.32 ng/mL (22 @ 06:00)  Procalcitonin, Serum: 0.28 ng/mL (22 @ 16:15)    Urinalysis Basic - ( 25 Dec 2022 22:09 )    Color: Yellow / Appearance: Clear / S.015 / pH: x  Gluc: x / Ketone: Negative  / Bili: Negative / Urobili: Negative   Blood: x / Protein: 30 mg/dL / Nitrite: Negative   Leuk Esterase: Negative / RBC: Negative /HPF / WBC Negative /HPF   Sq Epi: x / Non Sq Epi: x / Bacteria: x          RADIOLOGY & ADDITIONAL TESTS:    Care Discussed with Consultants/Other Providers:    Patient is a 88y old  Male who presents with a chief complaint of shortness of breath (25 Dec 2022 13:00)      24 HOUR EVENTS:  No overnight events reported.     SUBJECTIVE:  Patient seen and examined at bedside. He complains of shortness of breath. He feels weak. he developed a nose bleed from nasal cannula.     ALLERGIES:  No Known Allergies    MEDICATIONS  (STANDING):  albuterol/ipratropium for Nebulization 3 milliLiter(s) Nebulizer every 6 hours  allopurinol 100 milliGRAM(s) Oral daily  apixaban 2.5 milliGRAM(s) Oral every 12 hours  aspirin enteric coated 81 milliGRAM(s) Oral daily  benzonatate 200 milliGRAM(s) Oral three times a day  budesonide  80 MICROgram(s)/formoterol 4.5 MICROgram(s) Inhaler 2 Puff(s) Inhalation two times a day  fluticasone propionate 50 MICROgram(s)/spray Nasal Spray 1 Spray(s) Both Nostrils two times a day  furosemide   Injectable 40 milliGRAM(s) IV Push once  guaiFENesin  milliGRAM(s) Oral every 12 hours  hydrALAZINE 25 milliGRAM(s) Oral every 8 hours  hydrocodone/homatropine Syrup 5 milliLiter(s) Oral at bedtime  metoprolol succinate ER 50 milliGRAM(s) Oral daily  predniSONE   Tablet 20 milliGRAM(s) Oral daily  senna 2 Tablet(s) Oral at bedtime  sodium chloride 0.65% Nasal 1 Spray(s) Both Nostrils every 6 hours    MEDICATIONS  (PRN):    Vital Signs Last 24 Hrs  T(F): 97.7 (26 Dec 2022 05:26), Max: 98.3 (25 Dec 2022 19:55)  HR: 80 (26 Dec 2022 09:46) (78 - 84)  BP: 149/66 (26 Dec 2022 05:26) (129/60 - 150/72)  RR: 16 (26 Dec 2022 05:26) (16 - 16)  SpO2: 94% (26 Dec 2022 09:46) (91% - 96%)  I&O's Summary    25 Dec 2022 07:01  -  26 Dec 2022 07:00  --------------------------------------------------------  IN: 0 mL / OUT: 400 mL / NET: -400 mL      PHYSICAL EXAM:  General: NAD, Awake and alert, difficult to understand speech.  ENT: Moist mucous membranes, no thrush, + epistaxis  Neck: Supple, No JVD  Lungs: poor respiratory effort, non-labored breathing, NC  Cardio: RRR, S1/S2, No murmur  Abdomen: Soft, Nontender, Nondistended; Bowel sounds present  Extremities: No calf tenderness, 2+ pitting edema, no contractures.    LABS:                        12.2   15.54 )-----------( 265      ( 26 Dec 2022 05:00 )             37.9         141  |  101  |  63  ----------------------------<  101  3.8   |  29  |  1.53    Ca    9.2      26 Dec 2022 05:00      Procalcitonin, Serum: 0.20 ng/mL (22 @ 05:00)  Procalcitonin, Serum: 0.32 ng/mL (22 @ 06:00)  Procalcitonin, Serum: 0.28 ng/mL (22 @ 16:15)    Urinalysis Basic - ( 25 Dec 2022 22:09 )    Color: Yellow / Appearance: Clear / S.015 / pH: x  Gluc: x / Ketone: Negative  / Bili: Negative / Urobili: Negative   Blood: x / Protein: 30 mg/dL / Nitrite: Negative   Leuk Esterase: Negative / RBC: Negative /HPF / WBC Negative /HPF   Sq Epi: x / Non Sq Epi: x / Bacteria: x          RADIOLOGY & ADDITIONAL TESTS:    Care Discussed with Consultants/Other Providers:

## 2022-12-26 NOTE — PROGRESS NOTE ADULT - SUBJECTIVE AND OBJECTIVE BOX
Follow-up Pulmonary Progress Note  Chief Complaint : Heart failure        patient Seen and examined   on RA Sat 88-92%  complain of dry nose and nose bleed  states  cant breathe because of it.       Allergies :No Known Allergies      PAST MEDICAL & SURGICAL HISTORY:  Afib    Congestive heart failure (CHF)    CVA (cerebral vascular accident)    Hypertension, unspecified type    No significant past surgical history        Medications:  MEDICATIONS  (STANDING):  albuterol/ipratropium for Nebulization 3 milliLiter(s) Nebulizer every 6 hours  allopurinol 100 milliGRAM(s) Oral daily  apixaban 2.5 milliGRAM(s) Oral every 12 hours  aspirin enteric coated 81 milliGRAM(s) Oral daily  benzonatate 200 milliGRAM(s) Oral three times a day  budesonide  80 MICROgram(s)/formoterol 4.5 MICROgram(s) Inhaler 2 Puff(s) Inhalation two times a day  fluticasone propionate 50 MICROgram(s)/spray Nasal Spray 1 Spray(s) Both Nostrils two times a day  furosemide   Injectable 40 milliGRAM(s) IV Push once  guaiFENesin  milliGRAM(s) Oral every 12 hours  hydrALAZINE 25 milliGRAM(s) Oral every 8 hours  hydrocodone/homatropine Syrup 5 milliLiter(s) Oral at bedtime  metoprolol succinate ER 50 milliGRAM(s) Oral daily  predniSONE   Tablet 20 milliGRAM(s) Oral daily  senna 2 Tablet(s) Oral at bedtime  sodium chloride 0.65% Nasal 1 Spray(s) Both Nostrils every 6 hours    MEDICATIONS  (PRN):      Antibiotics History  oseltamivir 75 milliGRAM(s) Oral once, 22 @ 20:55, Stop order after: 1 Doses  oseltamivir 30 milliGRAM(s) Oral every 12 hours, 22 @ 09:00, Stop order after: 4 Days  oseltamivir 30 milliGRAM(s) Oral daily, 22 @ 00:00, Stop order after: 1 Doses      Heme Medications   apixaban 2.5 milliGRAM(s) Oral every 12 hours, 22 @ 13:59  aspirin enteric coated 81 milliGRAM(s) Oral daily, 22 @ 11:33      GI Medications  senna 2 Tablet(s) Oral at bedtime, 12-17-22 @ 11:34, Routine        LABS:                        12.2   15.54 )-----------( 265      ( 26 Dec 2022 05:00 )             37.9         141  |  101  |  63<H>  ----------------------------<  101<H>  3.8   |  29  |  1.53<H>    Ca    9.2      26 Dec 2022 05:00                Urinalysis Basic - ( 25 Dec 2022 22:09 )    Color: Yellow / Appearance: Clear / S.015 / pH: x  Gluc: x / Ketone: Negative  / Bili: Negative / Urobili: Negative   Blood: x / Protein: 30 mg/dL / Nitrite: Negative   Leuk Esterase: Negative / RBC: Negative /HPF / WBC Negative /HPF   Sq Epi: x / Non Sq Epi: x / Bacteria: x      Procalcitonin, Serum: 0.20 ng/mL (22 @ 05:00)  Procalcitonin, Serum: 0.32 ng/mL (22 @ 06:00)  Procalcitonin, Serum: 0.28 ng/mL (22 @ 16:15)    Serum Pro-Brain Natriuretic Peptide: 3188 pg/mL (22 @ 05:00)  Serum Pro-Brain Natriuretic Peptide: 2497 pg/mL (22 @ 05:45)    < from: Xray Chest 1 View- PORTABLE-Routine (Xray Chest 1 View- PORTABLE-Routine in AM.) (22 @ 10:32) >  ACC: [id number] EXAM:  XR CHEST PORTABLE ROUTINE 1V                          PROCEDURE DATE:  2022          INTERPRETATION:  AP chest on 2022 at 10:11 AM. There is   history of heart failure.    Gross heart enlargement again noted.    On  there is a very dense infiltrate in the anterior segment   of the right upper lobe the presently shows better aeration.    Left base process likely a mix of atelectasis and effusion is again noted.    IMPRESSION: As above.    --- End of Report ---      < end of copied text >           VITALS:  T(C): 36.5 (22 @ 05:26), Max: 36.8 (22 @ 19:55)  T(F): 97.7 (22 @ 05:26), Max: 98.3 (22 @ 19:55)  HR: 80 (22 @ 09:46) (78 - 84)  BP: 149/66 (22 @ 05:26) (129/60 - 150/72)  BP(mean): --  ABP: --  ABP(mean): --  RR: 16 (22 @ 05:26) (16 - 16)  SpO2: 94% (22 @ 09:46) (91% - 96%)  CVP(mm Hg): --  CVP(cm H2O): --    Ins and Outs     22 @ 07:01  -  22 @ 07:00  --------------------------------------------------------  IN: 0 mL / OUT: 400 mL / NET: -400 mL                I&O's Detail    25 Dec 2022 07:01  -  26 Dec 2022 07:00  --------------------------------------------------------  IN:  Total IN: 0 mL    OUT:    Voided (mL): 400 mL  Total OUT: 400 mL    Total NET: -400 mL

## 2022-12-26 NOTE — PROGRESS NOTE ADULT - ASSESSMENT
Physical Examination:  GENERAL:               Alert,  no acute distress.    HEENT:                    + JVD, Moist MM  PULM:                     Bilateral air entry, Clear to auscultation bilaterally, no significant sputum production, no Rales, No Rhonchi, trace Wheezing  CVS:                         S1, S2,  +Murmur  ABD:                        Soft, nondistended, nontender, normoactive bowel sounds,   EXT:                         mild edema, nontender, No Cyanosis or Clubbing   NEURO:                  Alert, oriented, interactive, nonfocal, follows commands  PSYC:                      Calm, + Insight.        88 year old male with a PMH of CVA, CHF, HTN, afib on eliquis, hip fx s/p fall and prior intubation for HF exacerbation presenting to [facility] ED for SOB and CP. Pt noted to be hypoxemic with elevated sbp to 200's. Pt tx with NIV, lasix and nitro with +response. ICU team consulted. Influenza A + with mild elevated troponin, elevated bnp and ?IRINEO on labs. When we arrived pt was alert and oriented, with O2 sat 100%, rate controlled afib on monitor and sbp 140. Pt trial off bipap with NC ~4 liters without desaturation or respiratory distress. ABG obtained with the following results: 7.37 /  45 / 87 / 26 97% on 4 liters NC. Troponin mildly elevated and has stabilized with no major change and ekg is without acute ischemia noted currently appearing to be demand ischemia at this time (also he is on AC at home), cardiology contacted by ED MD. BNP elevated to 6k. Given pt appears comfortable and states that he is "feeling better" along with his neurological, respiratory (off NIV) and hemodynamic stability  he is not currently a candidate for ICU and is appropriate for monitoring on telemetry under medicine service. However, if any changes should occur please contact our team immediately. Thank you. Recommending the following:    Assessment  1. Influenza A  2. 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 Elevated trop, r/o NSTEMI  4. Underlying Afib on Eliquis, HTN, CVA,   5. Increasing Cr / IRINEO normal baseline    Plan   monitor on room air, n/c o2 prn- patient wants it more than he requires it  Continue flonase and add  nasal saline spray   increase symbicort dose Symbicort  continue Mucinex and tessalon for cough  CXR stable/improving  Oral diet  encouraged incentive spirometry   prednisone taper to off   continue  duoneb atc  ASA, A/c with eliquis  Cardio Follow up to optimize cardiac meds.   PT OOB    PMD:				                   Notified(Date):  Family Updated: 	DTR Lora 	                Date: 12/23/22   - updated on status     Sedation & Analgesia:	  Diet/Nutrition:		Diet, Minced and Moist:   Consistent Carbohydrate No Snacks  DASH/TLC Sodium & Cholesterol Restricted (12-18-22 @ 08:09) [Active]     GI PPx:			  senna 2 Tablet(s) Oral at bedtime     DVT Ppx:		  apixaban 5 milliGRAM(s) Oral two times a day  aspirin enteric coated 81 milliGRAM(s) Oral daily       Disposition: Continue care on medical floor , d/w Jenna Martinez    Goals of Care: Full code

## 2022-12-27 LAB
ANION GAP SERPL CALC-SCNC: 11 MMOL/L — SIGNIFICANT CHANGE UP (ref 5–17)
BUN SERPL-MCNC: 64 MG/DL — HIGH (ref 7–23)
CALCIUM SERPL-MCNC: 9 MG/DL — SIGNIFICANT CHANGE UP (ref 8.4–10.5)
CHLORIDE SERPL-SCNC: 103 MMOL/L — SIGNIFICANT CHANGE UP (ref 96–108)
CO2 SERPL-SCNC: 29 MMOL/L — SIGNIFICANT CHANGE UP (ref 22–31)
CREAT SERPL-MCNC: 1.72 MG/DL — HIGH (ref 0.5–1.3)
EGFR: 38 ML/MIN/1.73M2 — LOW
GLUCOSE SERPL-MCNC: 124 MG/DL — HIGH (ref 70–99)
HCT VFR BLD CALC: 39.2 % — SIGNIFICANT CHANGE UP (ref 39–50)
HGB BLD-MCNC: 12.7 G/DL — LOW (ref 13–17)
MCHC RBC-ENTMCNC: 31.8 PG — SIGNIFICANT CHANGE UP (ref 27–34)
MCHC RBC-ENTMCNC: 32.4 GM/DL — SIGNIFICANT CHANGE UP (ref 32–36)
MCV RBC AUTO: 98 FL — SIGNIFICANT CHANGE UP (ref 80–100)
NRBC # BLD: 0 /100 WBCS — SIGNIFICANT CHANGE UP (ref 0–0)
PLATELET # BLD AUTO: 292 K/UL — SIGNIFICANT CHANGE UP (ref 150–400)
POTASSIUM SERPL-MCNC: 3.8 MMOL/L — SIGNIFICANT CHANGE UP (ref 3.5–5.3)
POTASSIUM SERPL-SCNC: 3.8 MMOL/L — SIGNIFICANT CHANGE UP (ref 3.5–5.3)
RBC # BLD: 4 M/UL — LOW (ref 4.2–5.8)
RBC # FLD: 13.8 % — SIGNIFICANT CHANGE UP (ref 10.3–14.5)
S PNEUM AG UR QL: NEGATIVE — SIGNIFICANT CHANGE UP
SODIUM SERPL-SCNC: 143 MMOL/L — SIGNIFICANT CHANGE UP (ref 135–145)
WBC # BLD: 22.46 K/UL — HIGH (ref 3.8–10.5)
WBC # FLD AUTO: 22.46 K/UL — HIGH (ref 3.8–10.5)

## 2022-12-27 PROCEDURE — 71250 CT THORAX DX C-: CPT | Mod: 26

## 2022-12-27 PROCEDURE — 99233 SBSQ HOSP IP/OBS HIGH 50: CPT

## 2022-12-27 RX ORDER — CEFTRIAXONE 500 MG/1
1000 INJECTION, POWDER, FOR SOLUTION INTRAMUSCULAR; INTRAVENOUS ONCE
Refills: 0 | Status: COMPLETED | OUTPATIENT
Start: 2022-12-27 | End: 2022-12-27

## 2022-12-27 RX ORDER — CEFEPIME 1 G/1
1000 INJECTION, POWDER, FOR SOLUTION INTRAMUSCULAR; INTRAVENOUS EVERY 12 HOURS
Refills: 0 | Status: DISCONTINUED | OUTPATIENT
Start: 2022-12-28 | End: 2023-01-03

## 2022-12-27 RX ORDER — CEFEPIME 1 G/1
1000 INJECTION, POWDER, FOR SOLUTION INTRAMUSCULAR; INTRAVENOUS ONCE
Refills: 0 | Status: COMPLETED | OUTPATIENT
Start: 2022-12-27 | End: 2022-12-27

## 2022-12-27 RX ORDER — CEFTRIAXONE 500 MG/1
INJECTION, POWDER, FOR SOLUTION INTRAMUSCULAR; INTRAVENOUS
Refills: 0 | Status: DISCONTINUED | OUTPATIENT
Start: 2022-12-27 | End: 2022-12-27

## 2022-12-27 RX ORDER — CEFEPIME 1 G/1
INJECTION, POWDER, FOR SOLUTION INTRAMUSCULAR; INTRAVENOUS
Refills: 0 | Status: DISCONTINUED | OUTPATIENT
Start: 2022-12-27 | End: 2023-01-03

## 2022-12-27 RX ADMIN — Medication 81 MILLIGRAM(S): at 12:23

## 2022-12-27 RX ADMIN — Medication 25 MILLIGRAM(S): at 15:37

## 2022-12-27 RX ADMIN — APIXABAN 2.5 MILLIGRAM(S): 2.5 TABLET, FILM COATED ORAL at 18:28

## 2022-12-27 RX ADMIN — Medication 3 MILLILITER(S): at 05:05

## 2022-12-27 RX ADMIN — Medication 1 SPRAY(S): at 18:27

## 2022-12-27 RX ADMIN — APIXABAN 2.5 MILLIGRAM(S): 2.5 TABLET, FILM COATED ORAL at 05:53

## 2022-12-27 RX ADMIN — Medication 1 SPRAY(S): at 05:52

## 2022-12-27 RX ADMIN — BUDESONIDE AND FORMOTEROL FUMARATE DIHYDRATE 2 PUFF(S): 160; 4.5 AEROSOL RESPIRATORY (INHALATION) at 09:06

## 2022-12-27 RX ADMIN — Medication 200 MILLIGRAM(S): at 05:52

## 2022-12-27 RX ADMIN — Medication 1 SPRAY(S): at 12:22

## 2022-12-27 RX ADMIN — Medication 600 MILLIGRAM(S): at 18:27

## 2022-12-27 RX ADMIN — Medication 25 MILLIGRAM(S): at 21:17

## 2022-12-27 RX ADMIN — Medication 1 SPRAY(S): at 21:16

## 2022-12-27 RX ADMIN — Medication 600 MILLIGRAM(S): at 05:52

## 2022-12-27 RX ADMIN — SENNA PLUS 2 TABLET(S): 8.6 TABLET ORAL at 21:17

## 2022-12-27 RX ADMIN — Medication 100 MILLIGRAM(S): at 12:22

## 2022-12-27 RX ADMIN — Medication 200 MILLIGRAM(S): at 21:17

## 2022-12-27 RX ADMIN — CEFTRIAXONE 100 MILLIGRAM(S): 500 INJECTION, POWDER, FOR SOLUTION INTRAMUSCULAR; INTRAVENOUS at 08:55

## 2022-12-27 RX ADMIN — Medication 200 MILLIGRAM(S): at 15:35

## 2022-12-27 RX ADMIN — CEFEPIME 100 MILLIGRAM(S): 1 INJECTION, POWDER, FOR SOLUTION INTRAMUSCULAR; INTRAVENOUS at 15:33

## 2022-12-27 RX ADMIN — Medication 50 MILLIGRAM(S): at 05:52

## 2022-12-27 RX ADMIN — Medication 25 MILLIGRAM(S): at 05:52

## 2022-12-27 RX ADMIN — Medication 20 MILLIGRAM(S): at 05:52

## 2022-12-27 NOTE — CHART NOTE - NSCHARTNOTEFT_GEN_A_CORE
Nutrition Follow Up Note  Hospital Course   (Per Electronic Medical Record)    Source:  Patient [X]  Medical Record [X]      Diet: Diet, Minced and Moist:   Consistent Carbohydrate {No Snacks}  DASH/TLC {Sodium & Cholesterol Restricted} (12-18-22 @ 08:09) [Active]    Nutrition Follow Up: Patient with suboptimal PO intakes at this time. Per previous RD note, patient on consistent CHO diet d/t prednisone. CKD3 noted. Nephro following. Monitor labs: Glucose, K+, Na+, BUN, Cr. Recommend obtaining Phos levels. Systolic BP elevated - addressed with DASH/TLC diet. Recommend continue with minced & moist modified diet as tolerated and suggest Ensure Pudding High Protein 4oz PO BID(Provides 480kcal & 24grams of Protein) to optimize PO intake and nutritional status.    Enteral/Parenteral Nutrition: Not Applicable    START [id number]    143  |  103  |  64<H>  ----------------------------<  124<H>  3.8   |  29  |  1.72<H>    Ca    9.0      27 Dec 2022 06:40    END CHEMFISH  START MEDSACTIVEMEDICATIONS  (STANDING):  albuterol/ipratropium for Nebulization 3 milliLiter(s) Nebulizer every 6 hours  allopurinol 100 milliGRAM(s) Oral daily  apixaban 2.5 milliGRAM(s) Oral every 12 hours  aspirin enteric coated 81 milliGRAM(s) Oral daily  benzonatate 200 milliGRAM(s) Oral three times a day  budesonide 160 MICROgram(s)/formoterol 4.5 MICROgram(s) Inhaler 2 Puff(s) Inhalation two times a day  cefTRIAXone   IVPB      fluticasone propionate 50 MICROgram(s)/spray Nasal Spray 1 Spray(s) Both Nostrils two times a day  guaiFENesin  milliGRAM(s) Oral every 12 hours  hydrALAZINE 25 milliGRAM(s) Oral every 8 hours  hydrocodone/homatropine Syrup 5 milliLiter(s) Oral at bedtime  metoprolol succinate ER 50 milliGRAM(s) Oral daily  predniSONE   Tablet 20 milliGRAM(s) Oral daily  senna 2 Tablet(s) Oral at bedtime  sodium chloride 0.65% Nasal 1 Spray(s) Both Nostrils every 6 hours    MEDICATIONS  (PRN):  END MEDSACTIVE  START DIETORDEREND DIETORDER  START ADMITDXHeart failure    END ADMITDX  START IOFS  END IOFS  START SKINPUEND SKINPU    Pertinent Medications: MEDICATIONS  (STANDING):  albuterol/ipratropium for Nebulization 3 milliLiter(s) Nebulizer every 6 hours  allopurinol 100 milliGRAM(s) Oral daily  apixaban 2.5 milliGRAM(s) Oral every 12 hours  aspirin enteric coated 81 milliGRAM(s) Oral daily  benzonatate 200 milliGRAM(s) Oral three times a day  budesonide 160 MICROgram(s)/formoterol 4.5 MICROgram(s) Inhaler 2 Puff(s) Inhalation two times a day  cefTRIAXone   IVPB      fluticasone propionate 50 MICROgram(s)/spray Nasal Spray 1 Spray(s) Both Nostrils two times a day  guaiFENesin  milliGRAM(s) Oral every 12 hours  hydrALAZINE 25 milliGRAM(s) Oral every 8 hours  hydrocodone/homatropine Syrup 5 milliLiter(s) Oral at bedtime  metoprolol succinate ER 50 milliGRAM(s) Oral daily  predniSONE   Tablet 20 milliGRAM(s) Oral daily  senna 2 Tablet(s) Oral at bedtime  sodium chloride 0.65% Nasal 1 Spray(s) Both Nostrils every 6 hours    MEDICATIONS  (PRN):      Pertinent Labs:  12-27 Na143 mmol/L Glu 124 mg/dL<H> K+ 3.8 mmol/L Cr  1.72 mg/dL<H> BUN 64 mg/dL<H> 12-23 Phos 2.5 mg/dL          Current Weight (lbs):  173.2 lb on 12/27  Weight Trends (lbs):   171.5 (12/22), 174.1 (12/21), 176.5 (12/21), 171 (12/20)      Skin: WDL    Edema: +2 at left/right leg    Last Bowel Movement: noted on 12/23    Estimated Needs:   [X] No Change Since Previous Assessment    Previous Nutrition Diagnosis:   Food and nutrition related knowledge deficit    Nutrition Diagnosis is [X] Ongoing -     [X] Resolved -    [X] Not Applicable     New Nutrition Diagnosis:   Moderate Malnutrition    Interventions:   1. Recommend Ensure Pudding High Protein 4oz PO BID(Provides 480kcal & 24grams of Protein) to optimize PO intake and nutritional status  2. Monitor daily PO intakes and daily weights  3. Monitor labs: Glucose, K+, Na+, BUN, Cr.    Monitoring & Evaluation:   [X] Weights   [X] PO Intake   [X] Skin Integrity   [X] Follow Up (Per Protocol)  [X] Tolerance to Diet Prescription   [X] Other: Labs & POCT    Registered Dietitian/Nutritionist Remains Available.  Grant Tyson RD, CDN    Phone# [phone]

## 2022-12-27 NOTE — PROGRESS NOTE ADULT - SUBJECTIVE AND OBJECTIVE BOX
Comfortable on RA    Vital Signs Last 24 Hrs  T(C): 36.5 (22 @ 15:19), Max: 36.8 (22 @ 05:05)  T(F): 97.7 (22 @ 15:19), Max: 98.2 (22 @ 05:05)  HR: 78 (22 @ 15:19) (78 - 90)  BP: 141/50 (22 @ 15:19) (124/62 - 141/50)  RR: 18 (22 @ 15:19) (16 - 18)  SpO2: 94% (22 @ 15:19) (92% - 98%)    I&O's Detail    27 Dec 2022 07:01  -  27 Dec 2022 19:13  --------------------------------------------------------  IN:    Oral Fluid: 560 mL  Total IN: 560 mL    OUT:    Voided (mL): 400 mL  Total OUT: 400 mL    Total NET: 160 mL    s1s2  b/l air entry  soft, ND  + edema, stasis changes                                 12.7   22.46 )-----------( 292      ( 27 Dec 2022 06:40 )             39.2     27 Dec 2022 06:40    143    |  103    |  64     ----------------------------<  124    3.8     |  29     |  1.72     Ca    9.0        27 Dec 2022 06:40    Urinalysis Basic - ( 25 Dec 2022 22:09 )    Color: Yellow / Appearance: Clear / S.015 / pH: x  Gluc: x / Ketone: Negative  / Bili: Negative / Urobili: Negative   Blood: x / Protein: 30 mg/dL / Nitrite: Negative   Leuk Esterase: Negative / RBC: Negative /HPF / WBC Negative /HPF   Sq Epi: x / Non Sq Epi: x / Bacteria: x    albuterol/ipratropium for Nebulization 3 milliLiter(s) Nebulizer every 6 hours  allopurinol 100 milliGRAM(s) Oral daily  apixaban 2.5 milliGRAM(s) Oral every 12 hours  aspirin enteric coated 81 milliGRAM(s) Oral daily  benzonatate 200 milliGRAM(s) Oral three times a day  budesonide 160 MICROgram(s)/formoterol 4.5 MICROgram(s) Inhaler 2 Puff(s) Inhalation two times a day  cefepime   IVPB      fluticasone propionate 50 MICROgram(s)/spray Nasal Spray 1 Spray(s) Both Nostrils two times a day  guaiFENesin  milliGRAM(s) Oral every 12 hours  hydrALAZINE 25 milliGRAM(s) Oral every 8 hours  hydrocodone/homatropine Syrup 5 milliLiter(s) Oral at bedtime  metoprolol succinate ER 50 milliGRAM(s) Oral daily  predniSONE   Tablet 20 milliGRAM(s) Oral daily  senna 2 Tablet(s) Oral at bedtime  sodium chloride 0.65% Nasal 1 Spray(s) Both Nostrils every 6 hours    A/P:    Flu A  CM, mod MR, TR, EF 45-50%  Hemodynamic IRINEO/CKD 3 (Cr 1.16 - 3/10/22)  UA bland  Renal SONO w/o hydro  No objection to IV Lasix as needed  May have to accept higher Cr  F/u BMP     [phone]

## 2022-12-27 NOTE — CONSULT NOTE ADULT - ASSESSMENT
88y male with hx of CVA, CHF, HTN, afib, hip fx s/p fall. He preseted to  ER on 12/18  with SOB and CP, elevated sbp to 200's. He was treated with NIV, lasix and nitro. He tested +Influenza A with mild elevated troponin, elevated bnp and ?IRINEO. He completed renally dosed Tamiflu and prednisone taper, currently 20 mg daily. He cont to feel SOB and with rising WBC and hence ID eval requested. He denies any fevers or chills, has weak, unproductive cough  CT chest with multifocal infiltrates  Suggest  Change CTX to Cefepime to cover for HCAP  check MRSA and legionella screen  Monitor WBC and fevers trend  Aspiration precautions  Diuresis as per renal and cardiology

## 2022-12-27 NOTE — PROGRESS NOTE ADULT - SUBJECTIVE AND OBJECTIVE BOX
Time of Visit:  Patient seen and examined. sitting in chair with dry cough    MEDICATIONS  (STANDING):  albuterol/ipratropium for Nebulization 3 milliLiter(s) Nebulizer every 6 hours  allopurinol 100 milliGRAM(s) Oral daily  apixaban 2.5 milliGRAM(s) Oral every 12 hours  aspirin enteric coated 81 milliGRAM(s) Oral daily  benzonatate 200 milliGRAM(s) Oral three times a day  budesonide 160 MICROgram(s)/formoterol 4.5 MICROgram(s) Inhaler 2 Puff(s) Inhalation two times a day  cefepime   IVPB      fluticasone propionate 50 MICROgram(s)/spray Nasal Spray 1 Spray(s) Both Nostrils two times a day  guaiFENesin  milliGRAM(s) Oral every 12 hours  hydrALAZINE 25 milliGRAM(s) Oral every 8 hours  hydrocodone/homatropine Syrup 5 milliLiter(s) Oral at bedtime  metoprolol succinate ER 50 milliGRAM(s) Oral daily  predniSONE   Tablet 20 milliGRAM(s) Oral daily  senna 2 Tablet(s) Oral at bedtime  sodium chloride 0.65% Nasal 1 Spray(s) Both Nostrils every 6 hours      MEDICATIONS  (PRN):       Medications up to date at time of exam.      PHYSICAL EXAMINATION:  Patient has no new complaints.  GENERAL: The patient is a well-developed, well-nourished, in no apparent distress.     Vital Signs Last 24 Hrs  T(C): 36.5 (27 Dec 2022 15:19), Max: 36.8 (27 Dec 2022 05:05)  T(F): 97.7 (27 Dec 2022 15:19), Max: 98.2 (27 Dec 2022 05:05)  HR: 78 (27 Dec 2022 15:19) (78 - 90)  BP: 141/50 (27 Dec 2022 15:19) (124/62 - 141/50)  BP(mean): --  RR: 18 (27 Dec 2022 15:19) (16 - 18)  SpO2: 94% (27 Dec 2022 15:19) (92% - 98%)    Parameters below as of 27 Dec 2022 15:19  Patient On (Oxygen Delivery Method): room air       (if applicable)    Chest Tube (if applicable)    HEENT: Head is normocephalic and atraumatic. Extraocular muscles are intact. Mucous membranes are moist.     NECK: Supple, no palpable adenopathy.    LUNGS: Clear to auscultation, no wheezing, rales, or rhonchi.    HEART: Regular rate and rhythm without murmur.    ABDOMEN: Soft, nontender, and nondistended.  No hepatosplenomegaly is noted.    EXTREMITIES: Without any cyanosis, clubbing, rash, lesions or edema.    NEUROLOGIC: Awake, alert, oriented, grossly intact    SKIN: Warm, dry, good turgor.      LABS:                        12.7   22.46 )-----------( 292      ( 27 Dec 2022 06:40 )             39.2         143  |  103  |  64<H>  ----------------------------<  124<H>  3.8   |  29  |  1.72<H>    Ca    9.0      27 Dec 2022 06:40        Urinalysis Basic - ( 25 Dec 2022 22:09 )    Color: Yellow / Appearance: Clear / S.015 / pH: x  Gluc: x / Ketone: Negative  / Bili: Negative / Urobili: Negative   Blood: x / Protein: 30 mg/dL / Nitrite: Negative   Leuk Esterase: Negative / RBC: Negative /HPF / WBC Negative /HPF   Sq Epi: x / Non Sq Epi: x / Bacteria: x              Serum Pro-Brain Natriuretic Peptide: 3188 pg/mL (22 @ 05:00)  Serum Pro-Brain Natriuretic Peptide: 2497 pg/mL (22 @ 05:45)      Procalcitonin, Serum: 0.20 ng/mL (22 @ 05:00)      MICROBIOLOGY: (if applicable)    RADIOLOGY & ADDITIONAL STUDIES:  EKG:   CT chest:< from: CT Chest No Cont (22 @ 08:57) >    ACC: [id number] EXAM:  CT CHEST                          PROCEDURE DATE:  2022          INTERPRETATION:  INDICATION: pneumonia, leukocytosis  TECHNIQUE: Unenhanced CT of the chest. Coronal, sagittal, and MIP images   were reconstructed and reviewed.  COMPARISON: Extremity  chest CT.    FINDINGS:    AIRWAYS, LUNGS and PLEURA: Patent central airways. Consolidations within   bilateral lower lobes and patchy/nodular opacities within all lobes   likely representing multifocal pneumonia. Small left pleural effusion.    LYMPH NODES, MEDIASTINUM AND YANIQUE: Stable enlarged mediastinal lymph   nodes measuring up to 3.5 x 2.5 cm within subcarinal region.    HEART AND VESSELS: Cardiomegaly. No pericardial effusion. Stable   calcified densities along the right lateral margin of the aortic root.   Thoracic aorta normal in diameter. Dilated pulmonary arteries. Coronary   calcifications.    VISUALIZED UPPER ABDOMEN: Small calcified nodules within the liver   parenchyma. Cholelithiasis. Bilateralrenal cysts.    CHEST WALL AND BONES: No aggressive osseous lesion. Advanced   osteoarthritis of the glenohumeral joints, left greater than the right.   Old fracture deformity of bilateral ribs. Osteoporosis.    LOWER NECK: Within normal limits.    IMPRESSION:    Consolidations within bilateral lower lobes and patchy/nodular opacities   within all lobes likely representing multifocal pneumonia. Small left   pleural effusion.    --- End of Report ---            YARON THOMAS MD; Attending Radiologist  This document has been electronically signed. Dec 27 2022  1:40PM    < end of copied text >    ECHO:    IMPRESSION: 88y Male PAST MEDICAL & SURGICAL HISTORY:  Afib      Congestive heart failure (CHF)      CVA (cerebral vascular accident)      Hypertension, unspecified type      No significant past surgical history       p/w           IMP: This is an 88 year old man  with a  CVA, CHF, HTN, afib on eliquis, hip fx s/p fall and prior intubation for HF exacerbation presenting to [facility] ED for SOB and CP. Pt noted to be hypoxemic with elevated sbp to 200's. Pt tx with NIV, lasix and nitro with +response. ICU team consulted. Influenza A + with mild elevated troponin, elevated bnp and ?IRINEO on labs. When we arrived pt was alert and oriented, with O2 sat 100%, rate controlled afib on monitor and sbp 140. Pt trial off bipap with NC ~4 liters without desaturation or respiratory distress. ABG obtained with the following results: 7.37 /  45 / 87 / 26 97% on 4 liters NC. Troponin mildly elevated and has stabilized with no major change and ekg is without acute ischemia noted currently appearing to be demand ischemia at this time (also he is on AC at home), cardiology contacted by ED MD. BNP elevated to 6k. Given pt appears comfortable and states that he is "feeling better" along with his neurological, respiratory (off NIV).   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

## 2022-12-27 NOTE — PROGRESS NOTE ADULT - SUBJECTIVE AND OBJECTIVE BOX
Patient is a 88y old  Male who presents with a chief complaint of shortness of breath (27 Dec 2022 19:12)    BRIEF HOSPITAL COURSE:   88 year old male with a PMH of CVA, CHF, HTN, afib on eliquis, hip fx s/p fall and prior intubation for HF exacerbation presenting to [facility] ED for SOB and CP. Pt noted to be hypoxemic with elevated sbp to 200's, mild inc in troponin level thought to be from demand ischemia.  Patient admitted to the ICU with acute hypoxic respiratory failure, HTN urgency, flash pulmonary edema and acute HF exacerbation (midrange EF of 45-50%), found to be Influenza A positive, s/p tamiflu. He was initially on BiPAP, nitro gtt for BP control and IV lasix, then transitioned to PO medications. Pt downgraded to the floor    Events last 24 hours:   -Abx broadened to Cefepime for HCAP coverage per ID recs.  -Remains off supplemental O2. Afebrile. Hemodynamics stable.     PAST MEDICAL & SURGICAL HISTORY:  Afib  Congestive heart failure (CHF)  CVA (cerebral vascular accident)  Hypertension, unspecified type  No significant past surgical history    Review of Systems:  CONSTITUTIONAL: No fever, chills, or fatigue  EYES: No eye pain, visual disturbances, or discharge  ENMT:  No difficulty hearing, tinnitus, vertigo; No sinus or throat pain  NECK: No pain or stiffness  RESPIRATORY: No cough, wheezing, chills or hemoptysis; No shortness of breath  CARDIOVASCULAR: No chest pain, palpitations, dizziness, or leg swelling  GASTROINTESTINAL: No abdominal or epigastric pain. No nausea, vomiting, or hematemesis; No diarrhea or constipation. No melena or hematochezia.  GENITOURINARY: No dysuria, frequency, hematuria, or incontinence  NEUROLOGICAL: No headaches, memory loss, loss of strength, numbness, or tremors  SKIN: No itching, burning, rashes, or lesions   MUSCULOSKELETAL: No joint pain or swelling; No muscle, back, or extremity pain  PSYCHIATRIC: No depression, anxiety, mood swings, or difficulty sleeping    Medications:  cefepime   IVPB      hydrALAZINE 25 milliGRAM(s) Oral every 8 hours  metoprolol succinate ER 50 milliGRAM(s) Oral daily  albuterol/ipratropium for Nebulization 3 milliLiter(s) Nebulizer every 6 hours  benzonatate 200 milliGRAM(s) Oral three times a day  budesonide 160 MICROgram(s)/formoterol 4.5 MICROgram(s) Inhaler 2 Puff(s) Inhalation two times a day  guaiFENesin  milliGRAM(s) Oral every 12 hours  hydrocodone/homatropine Syrup 5 milliLiter(s) Oral at bedtime  apixaban 2.5 milliGRAM(s) Oral every 12 hours  aspirin enteric coated 81 milliGRAM(s) Oral daily  senna 2 Tablet(s) Oral at bedtime  allopurinol 100 milliGRAM(s) Oral daily  predniSONE   Tablet 20 milliGRAM(s) Oral daily  fluticasone propionate 50 MICROgram(s)/spray Nasal Spray 1 Spray(s) Both Nostrils two times a day  sodium chloride 0.65% Nasal 1 Spray(s) Both Nostrils every 6 hours    ICU Vital Signs Last 24 Hrs  T(C): 36.6 (27 Dec 2022 20:05), Max: 36.8 (27 Dec 2022 05:05)  T(F): 97.9 (27 Dec 2022 20:05), Max: 98.2 (27 Dec 2022 05:05)  HR: 78 (27 Dec 2022 20:05) (78 - 90)  BP: 118/54 (27 Dec 2022 20:05) (118/54 - 141/50)  BP(mean): --  ABP: --  ABP(mean): --  RR: 18 (27 Dec 2022 20:05) (17 - 18)  SpO2: 92% (27 Dec 2022 20:05) (92% - 98%)  O2 Parameters below as of 27 Dec 2022 20:05  Patient On (Oxygen Delivery Method): room air    I&O's Detail  27 Dec 2022 07:01  -  27 Dec 2022 22:32  --------------------------------------------------------  IN:    Oral Fluid: 1280 mL  Total IN: 1280 mL  OUT:    Voided (mL): 700 mL  Total OUT: 700 mL  Total NET: 580 mL    LABS:                      12.7   22.46 )-----------( 292      ( 27 Dec 2022 06:40 )             39.2     12-27  143  |  103  |  64<H>  ----------------------------<  124<H>  3.8   |  29  |  1.72<H>  Ca    9.0      27 Dec 2022 06:40    CAPILLARY BLOOD GLUCOSE    CULTURES:    RADIOLOGY:   Reviewed.    Physical Examination:  General: Alert, oriented, interactive, nonfocal.  HEENT: PERRL.  NECK: Supple.   PULM: Clear to auscultation bilaterally.  CVS: s1/s2.  ABD: Soft, nondistended, nontender, normoactive bowel sounds.  EXT: No edema, nontender.  SKIN: Warm.    88 year old male with a PMH of CVA, CHF, HTN, afib on eliquis, hip fx s/p fall and prior intubation for HF exacerbation presenting to [facility] ED for SOB and CP. Pt noted to be hypoxemic with elevated sbp to 200's, mild inc in troponin level thought to be from demand ischemia.  Patient admitted to the ICU with acute hypoxic respiratory failure, HTN urgency, flash pulmonary edema and acute HF exacerbation (midrange EF of 45-50%), found to be Influenza A positive, s/p tamiflu. He was initially on BiPAP, nitro gtt for BP control and IV lasix, then transitioned to PO medications. Pt downgraded to the floor 12/22.     Plan:  Monitor mental status closely, avoid neurosuppresants.  Maintain goal MAP >65. Keep K~4 and Mg>2 for optimal arrhythmia suppression. ASA QD, Hydralazine q8h, Toprol QD. TTE w/ LVEF 40-45%. Cards following, recs appreciated.   Satting well on RA, goal SpO2 >88%. Will utilize supplemental O2 PRN to maintain goal SpO2. Incentive spirometry. Albuterol PRN. Symbicort BID.  Minced moist CC DASH/TLC Diet. Protonix QD.  Hx of CKD. Trend lytes/Scr daily with BMP, Strict I's and O's, goal UOP 0.5 cc/kg/hr, renal dose meds and avoid nephrotoxins.  Aggressive glycemic control to limit FS glucose to <180mg/dl. BS WNL.   S/p Tamiflu course for Influenza A. Prednisone taper. Abx broaden to Cefepime for possible HCAP coverage. ID following, recs appreciated. ABX use and/or discontinuation based on discussion with ID in conjunction with clinical features, culture data, and judicious procalcitonin monitoring.  Pharmacologic DVT Prophylaxis in addition to SCD's w/ Eliquis.  PT/OT evaluation.  AM Labs.    GOC: Full Code. Palliative following.     TIME SPENT: 37 minutes   Evaluating/treating patient, reviewing data/labs/imaging, discussing case with multidisciplinary team, discussing plan/goals of care with patient/family. Non-inclusive of procedure time.  Patient is a 88y old  Male who presents with a chief complaint of shortness of breath (27 Dec 2022 19:12)    BRIEF HOSPITAL COURSE:   88 year old male with a PMH of CVA, CHF, HTN, afib on eliquis, hip fx s/p fall and prior intubation for HF exacerbation presenting to [facility] ED for SOB and CP. Pt noted to be hypoxemic with elevated sbp to 200's, mild inc in troponin level thought to be from demand ischemia.  Patient admitted to the ICU with acute hypoxic respiratory failure, HTN urgency, flash pulmonary edema and acute HF exacerbation (midrange EF of 45-50%), found to be Influenza A positive, s/p tamiflu. He was initially on BiPAP, nitro gtt for BP control and IV lasix, then transitioned to PO medications. Pt downgraded to the floor 12/22.     Events last 24 hours:   -Abx broadened to Cefepime for HCAP coverage per ID recs.  -Remains off supplemental O2. Afebrile. Hemodynamics stable.     PAST MEDICAL & SURGICAL HISTORY:  Afib  Congestive heart failure (CHF)  CVA (cerebral vascular accident)  Hypertension, unspecified type  No significant past surgical history    Review of Systems:  CONSTITUTIONAL: No fever, chills, or fatigue  EYES: No eye pain, visual disturbances, or discharge  ENMT:  No difficulty hearing, tinnitus, vertigo; No sinus or throat pain  NECK: No pain or stiffness  RESPIRATORY: No cough, wheezing, chills or hemoptysis; No shortness of breath  CARDIOVASCULAR: No chest pain, palpitations, dizziness, or leg swelling  GASTROINTESTINAL: No abdominal or epigastric pain. No nausea, vomiting, or hematemesis; No diarrhea or constipation. No melena or hematochezia.  GENITOURINARY: No dysuria, frequency, hematuria, or incontinence  NEUROLOGICAL: No headaches, memory loss, loss of strength, numbness, or tremors  SKIN: No itching, burning, rashes, or lesions   MUSCULOSKELETAL: No joint pain or swelling; No muscle, back, or extremity pain  PSYCHIATRIC: No depression, anxiety, mood swings, or difficulty sleeping    Medications:  cefepime   IVPB      hydrALAZINE 25 milliGRAM(s) Oral every 8 hours  metoprolol succinate ER 50 milliGRAM(s) Oral daily  albuterol/ipratropium for Nebulization 3 milliLiter(s) Nebulizer every 6 hours  benzonatate 200 milliGRAM(s) Oral three times a day  budesonide 160 MICROgram(s)/formoterol 4.5 MICROgram(s) Inhaler 2 Puff(s) Inhalation two times a day  guaiFENesin  milliGRAM(s) Oral every 12 hours  hydrocodone/homatropine Syrup 5 milliLiter(s) Oral at bedtime  apixaban 2.5 milliGRAM(s) Oral every 12 hours  aspirin enteric coated 81 milliGRAM(s) Oral daily  senna 2 Tablet(s) Oral at bedtime  allopurinol 100 milliGRAM(s) Oral daily  predniSONE   Tablet 20 milliGRAM(s) Oral daily  fluticasone propionate 50 MICROgram(s)/spray Nasal Spray 1 Spray(s) Both Nostrils two times a day  sodium chloride 0.65% Nasal 1 Spray(s) Both Nostrils every 6 hours    ICU Vital Signs Last 24 Hrs  T(C): 36.6 (27 Dec 2022 20:05), Max: 36.8 (27 Dec 2022 05:05)  T(F): 97.9 (27 Dec 2022 20:05), Max: 98.2 (27 Dec 2022 05:05)  HR: 78 (27 Dec 2022 20:05) (78 - 90)  BP: 118/54 (27 Dec 2022 20:05) (118/54 - 141/50)  BP(mean): --  ABP: --  ABP(mean): --  RR: 18 (27 Dec 2022 20:05) (17 - 18)  SpO2: 92% (27 Dec 2022 20:05) (92% - 98%)  O2 Parameters below as of 27 Dec 2022 20:05  Patient On (Oxygen Delivery Method): room air    I&O's Detail  27 Dec 2022 07:01  -  27 Dec 2022 22:32  --------------------------------------------------------  IN:    Oral Fluid: 1280 mL  Total IN: 1280 mL  OUT:    Voided (mL): 700 mL  Total OUT: 700 mL  Total NET: 580 mL    LABS:                      12.7   22.46 )-----------( 292      ( 27 Dec 2022 06:40 )             39.2     12-27  143  |  103  |  64<H>  ----------------------------<  124<H>  3.8   |  29  |  1.72<H>  Ca    9.0      27 Dec 2022 06:40    CAPILLARY BLOOD GLUCOSE    CULTURES:    RADIOLOGY:   Reviewed.    Physical Examination:  General: Alert, oriented, interactive, nonfocal.  HEENT: PERRL.  NECK: Supple.   PULM: Clear to auscultation bilaterally.  CVS: s1/s2.  ABD: Soft, nondistended, nontender, normoactive bowel sounds.  EXT: No edema, nontender.  SKIN: Warm.    88 year old male with a PMH of CVA, CHF, HTN, afib on eliquis, hip fx s/p fall and prior intubation for HF exacerbation presenting to [facility] ED for SOB and CP. Pt noted to be hypoxemic with elevated sbp to 200's, mild inc in troponin level thought to be from demand ischemia.  Patient admitted to the ICU with acute hypoxic respiratory failure, HTN urgency, flash pulmonary edema and acute HF exacerbation (midrange EF of 45-50%), found to be Influenza A positive, s/p tamiflu. He was initially on BiPAP, nitro gtt for BP control and IV lasix, then transitioned to PO medications. Pt downgraded to the floor 12/22.     Plan:  Monitor mental status closely, avoid neurosuppresants.  Maintain goal MAP >65. Keep K~4 and Mg>2 for optimal arrhythmia suppression. ASA QD, Hydralazine q8h, Toprol QD. TTE w/ LVEF 40-45%. Cards following, recs appreciated.   Satting well on RA, goal SpO2 >88%. Will utilize supplemental O2 PRN to maintain goal SpO2. Incentive spirometry. Albuterol PRN. Symbicort BID.  Minced moist CC DASH/TLC Diet. Protonix QD.  Hx of CKD. Trend lytes/Scr daily with BMP, Strict I's and O's, goal UOP 0.5 cc/kg/hr, renal dose meds and avoid nephrotoxins.  Aggressive glycemic control to limit FS glucose to <180mg/dl. BS WNL.   S/p Tamiflu course for Influenza A. Prednisone taper. Abx broaden to Cefepime for possible HCAP coverage. ID following, recs appreciated. ABX use and/or discontinuation based on discussion with ID in conjunction with clinical features, culture data, and judicious procalcitonin monitoring.  Pharmacologic DVT Prophylaxis in addition to SCD's w/ Eliquis.  PT/OT evaluation.  AM Labs.    GOC: Full Code. Palliative following.     TIME SPENT: 37 minutes   Evaluating/treating patient, reviewing data/labs/imaging, discussing case with multidisciplinary team, discussing plan/goals of care with patient/family. Non-inclusive of procedure time.

## 2022-12-27 NOTE — PROGRESS NOTE ADULT - SUBJECTIVE AND OBJECTIVE BOX
Patient is a 88y old  Male who presents with a chief complaint of shortness of breath (26 Dec 2022 18:40)      24 HOUR EVENTS:  No overnight events reported.     SUBJECTIVE:  Patient seen and examined at bedside.     ALLERGIES:  No Known Allergies    MEDICATIONS  (STANDING):  albuterol/ipratropium for Nebulization 3 milliLiter(s) Nebulizer every 6 hours  allopurinol 100 milliGRAM(s) Oral daily  apixaban 2.5 milliGRAM(s) Oral every 12 hours  aspirin enteric coated 81 milliGRAM(s) Oral daily  benzonatate 200 milliGRAM(s) Oral three times a day  budesonide 160 MICROgram(s)/formoterol 4.5 MICROgram(s) Inhaler 2 Puff(s) Inhalation two times a day  cefTRIAXone   IVPB      fluticasone propionate 50 MICROgram(s)/spray Nasal Spray 1 Spray(s) Both Nostrils two times a day  guaiFENesin  milliGRAM(s) Oral every 12 hours  hydrALAZINE 25 milliGRAM(s) Oral every 8 hours  hydrocodone/homatropine Syrup 5 milliLiter(s) Oral at bedtime  metoprolol succinate ER 50 milliGRAM(s) Oral daily  predniSONE   Tablet 20 milliGRAM(s) Oral daily  senna 2 Tablet(s) Oral at bedtime  sodium chloride 0.65% Nasal 1 Spray(s) Both Nostrils every 6 hours    MEDICATIONS  (PRN):    Vital Signs Last 24 Hrs  T(F): 98.2 (27 Dec 2022 05:05), Max: 98.2 (27 Dec 2022 05:05)  HR: 88 (27 Dec 2022 09:07) (67 - 90)  BP: 133/72 (27 Dec 2022 05:05) (124/62 - 134/65)  RR: 17 (27 Dec 2022 05:05) (16 - 17)  SpO2: 93% (27 Dec 2022 09:07) (92% - 98%)  I&O's Summary    27 Dec 2022 07:01  -  27 Dec 2022 10:40  --------------------------------------------------------  IN: 300 mL / OUT: 200 mL / NET: 100 mL      PHYSICAL EXAM:  General: NAD, Awake and alert  ENT: Moist mucous membranes, no thrush  Neck: Supple, No JVD  Lungs: Clear to auscultation bilaterally, good air entry, non-labored breathing  Cardio: RRR, S1/S2, No murmur  Abdomen: Soft, Nontender, Nondistended; Bowel sounds present  Extremities: No calf tenderness, No pitting edema, no contractures.    LABS:                        12.7   22.46 )-----------( 292      ( 27 Dec 2022 06:40 )             39.2         143  |  103  |  64  ----------------------------<  124  3.8   |  29  |  1.72    Ca    9.0      27 Dec 2022 06:40    Procalcitonin, Serum: 0.20 ng/mL (22 @ 05:00)    Urinalysis Basic - ( 25 Dec 2022 22:09 )    Color: Yellow / Appearance: Clear / S.015 / pH: x  Gluc: x / Ketone: Negative  / Bili: Negative / Urobili: Negative   Blood: x / Protein: 30 mg/dL / Nitrite: Negative   Leuk Esterase: Negative / RBC: Negative /HPF / WBC Negative /HPF   Sq Epi: x / Non Sq Epi: x / Bacteria: x    RADIOLOGY & ADDITIONAL TESTS:    Care Discussed with Consultants/Other Providers:    Patient is a 88y old  Male who presents with a chief complaint of shortness of breath (26 Dec 2022 18:40)      24 HOUR EVENTS:  No overnight events reported.     SUBJECTIVE:  Patient seen and examined at bedside.   Continues to have shortness of breath and feels unwell.    ALLERGIES:  No Known Allergies    MEDICATIONS  (STANDING):  albuterol/ipratropium for Nebulization 3 milliLiter(s) Nebulizer every 6 hours  allopurinol 100 milliGRAM(s) Oral daily  apixaban 2.5 milliGRAM(s) Oral every 12 hours  aspirin enteric coated 81 milliGRAM(s) Oral daily  benzonatate 200 milliGRAM(s) Oral three times a day  budesonide 160 MICROgram(s)/formoterol 4.5 MICROgram(s) Inhaler 2 Puff(s) Inhalation two times a day  cefTRIAXone   IVPB      fluticasone propionate 50 MICROgram(s)/spray Nasal Spray 1 Spray(s) Both Nostrils two times a day  guaiFENesin  milliGRAM(s) Oral every 12 hours  hydrALAZINE 25 milliGRAM(s) Oral every 8 hours  hydrocodone/homatropine Syrup 5 milliLiter(s) Oral at bedtime  metoprolol succinate ER 50 milliGRAM(s) Oral daily  predniSONE   Tablet 20 milliGRAM(s) Oral daily  senna 2 Tablet(s) Oral at bedtime  sodium chloride 0.65% Nasal 1 Spray(s) Both Nostrils every 6 hours    MEDICATIONS  (PRN):    Vital Signs Last 24 Hrs  T(F): 98.2 (27 Dec 2022 05:05), Max: 98.2 (27 Dec 2022 05:05)  HR: 88 (27 Dec 2022 09:07) (67 - 90)  BP: 133/72 (27 Dec 2022 05:05) (124/62 - 134/65)  RR: 17 (27 Dec 2022 05:05) (16 - 17)  SpO2: 93% (27 Dec 2022 09:07) (92% - 98%)  I&O's Summary    27 Dec 2022 07:01  -  27 Dec 2022 10:40  --------------------------------------------------------  IN: 300 mL / OUT: 200 mL / NET: 100 mL      PHYSICAL EXAM:  General: NAD, Awake and alert  ENT: Moist mucous membranes, no thrush  Neck: Supple, No JVD  Lungs: Clear to auscultation bilaterally, good air entry, non-labored breathing  Cardio: RRR, S1/S2, No murmur  Abdomen: Soft, Nontender, Nondistended; Bowel sounds present  Extremities: No calf tenderness, No pitting edema, no contractures.    LABS:                        12.7   22.46 )-----------( 292      ( 27 Dec 2022 06:40 )             39.2         143  |  103  |  64  ----------------------------<  124  3.8   |  29  |  1.72    Ca    9.0      27 Dec 2022 06:40    Procalcitonin, Serum: 0.20 ng/mL (22 @ 05:00)    Urinalysis Basic - ( 25 Dec 2022 22:09 )    Color: Yellow / Appearance: Clear / S.015 / pH: x  Gluc: x / Ketone: Negative  / Bili: Negative / Urobili: Negative   Blood: x / Protein: 30 mg/dL / Nitrite: Negative   Leuk Esterase: Negative / RBC: Negative /HPF / WBC Negative /HPF   Sq Epi: x / Non Sq Epi: x / Bacteria: x    RADIOLOGY & ADDITIONAL TESTS:    < from: CT Chest No Cont (22 @ 08:57) >  IMPRESSION:  Consolidations within bilateral lower lobes and patchy/nodular opacities   within all lobes likely representing multifocal pneumonia. Small left   pleural effusion.  < end of copied text >    Care Discussed with Consultants/Other Providers:    Patient is a 88y old  Male who presents with a chief complaint of shortness of breath (26 Dec 2022 18:40)      24 HOUR EVENTS:  No overnight events reported.     SUBJECTIVE:  Patient seen and examined at bedside.   Continues to have shortness of breath and feels unwell. thinks he feels better after starting abx.    ALLERGIES:  No Known Allergies    MEDICATIONS  (STANDING):  albuterol/ipratropium for Nebulization 3 milliLiter(s) Nebulizer every 6 hours  allopurinol 100 milliGRAM(s) Oral daily  apixaban 2.5 milliGRAM(s) Oral every 12 hours  aspirin enteric coated 81 milliGRAM(s) Oral daily  benzonatate 200 milliGRAM(s) Oral three times a day  budesonide 160 MICROgram(s)/formoterol 4.5 MICROgram(s) Inhaler 2 Puff(s) Inhalation two times a day  cefTRIAXone   IVPB      fluticasone propionate 50 MICROgram(s)/spray Nasal Spray 1 Spray(s) Both Nostrils two times a day  guaiFENesin  milliGRAM(s) Oral every 12 hours  hydrALAZINE 25 milliGRAM(s) Oral every 8 hours  hydrocodone/homatropine Syrup 5 milliLiter(s) Oral at bedtime  metoprolol succinate ER 50 milliGRAM(s) Oral daily  predniSONE   Tablet 20 milliGRAM(s) Oral daily  senna 2 Tablet(s) Oral at bedtime  sodium chloride 0.65% Nasal 1 Spray(s) Both Nostrils every 6 hours    MEDICATIONS  (PRN):    Vital Signs Last 24 Hrs  T(F): 98.2 (27 Dec 2022 05:05), Max: 98.2 (27 Dec 2022 05:05)  HR: 88 (27 Dec 2022 09:07) (67 - 90)  BP: 133/72 (27 Dec 2022 05:05) (124/62 - 134/65)  RR: 17 (27 Dec 2022 05:05) (16 - 17)  SpO2: 93% (27 Dec 2022 09:07) (92% - 98%)  I&O's Summary    27 Dec 2022 07:01  -  27 Dec 2022 10:40  --------------------------------------------------------  IN: 300 mL / OUT: 200 mL / NET: 100 mL      PHYSICAL EXAM:  General: NAD, Awake and alert  ENT: Moist mucous membranes, no thrush  Neck: Supple, No JVD  Lungs: Clear to auscultation bilaterally, good air entry, non-labored breathing  Cardio: RRR, S1/S2, No murmur  Abdomen: Soft, Nontender, Nondistended; Bowel sounds present  Extremities: No calf tenderness, No pitting edema, no contractures.    LABS:                        12.7   22.46 )-----------( 292      ( 27 Dec 2022 06:40 )             39.2         143  |  103  |  64  ----------------------------<  124  3.8   |  29  |  1.72    Ca    9.0      27 Dec 2022 06:40    Procalcitonin, Serum: 0.20 ng/mL (22 @ 05:00)    Urinalysis Basic - ( 25 Dec 2022 22:09 )    Color: Yellow / Appearance: Clear / S.015 / pH: x  Gluc: x / Ketone: Negative  / Bili: Negative / Urobili: Negative   Blood: x / Protein: 30 mg/dL / Nitrite: Negative   Leuk Esterase: Negative / RBC: Negative /HPF / WBC Negative /HPF   Sq Epi: x / Non Sq Epi: x / Bacteria: x    RADIOLOGY & ADDITIONAL TESTS:    < from: CT Chest No Cont (22 @ 08:57) >  IMPRESSION:  Consolidations within bilateral lower lobes and patchy/nodular opacities   within all lobes likely representing multifocal pneumonia. Small left   pleural effusion.  < end of copied text >    Care Discussed with Consultants/Other Providers:

## 2022-12-27 NOTE — DIETITIAN NUTRITION RISK NOTIFICATION - TREATMENT: THE FOLLOWING DIET HAS BEEN RECOMMENDED
Diet, Minced and Moist:   Consistent Carbohydrate {No Snacks}  DASH/TLC {Sodium & Cholesterol Restricted}  Supplement Feeding Modality:  Oral  Ensure Pudding Cans or Servings Per Day:  1       Frequency:  Two Times a day (12-27-22 @ 10:56) [Pending Verification By Attending]  Diet, Minced and Moist:   Consistent Carbohydrate {No Snacks}  DASH/TLC {Sodium & Cholesterol Restricted} (12-18-22 @ 08:09) [Active]

## 2022-12-27 NOTE — PROGRESS NOTE ADULT - ASSESSMENT
88 year old male with a PMH of CVA, CHF, HTN, afib on eliquis, hip fx s/p fall and prior intubation for HF exacerbation presenting to [facility] ED for SOB and CP. Pt noted to be hypoxemic with elevated sbp to 200's, mild inc in troponin level thought to be from demand ischemia.  Patient admitted to the ICU with acute hypoxic respiratory failure, HTN urgency, flash pulmonary edema and acute HF exacerbation (midrange EF of 45-50%), found to be Influenza A positive, s/p tamiflu. He was initially on BiPAP, nitro gtt for BP control and IV lasix, then transitioned to PO medications. Pt downgraded to the floor when tolerating NC. He is on a steroid taper.  Palliative care consulted when pt was in ICU.    Acute hypoxic respiratory failure secondary to  Influenza A  Nasal congestion  currently weaned down to 1L NC, will cont to wean as able.  completed five days of tamiflu  pulm started symbicort, nasal saline spray, continue steroid taper and Duonebs ATC  started tessalon perles atc, hycodan and robitussin prn for cough  physical therapy recommends outpatient PT when cleared - will need a new PT re-eval    Acute Pulmonary Edema  Hypertensive Urgency with Acute on Chronic Combined CHF  Hold ARB due to renal function, redose IV lasix. d/w Dr. Buckley and Dr. Pineda.  continue hydralazine 25 q8, metoprolol succ 50 daily and amlodipine 10 daily  continue to monitor BP as worsening renal function  cardiac follow up to optimize meds  monitor volume status, daily weights, I/Os, replete lytes as needed  repeat CXR on 12/23 revealed left base process likely mix of atelectasis and effusion, RUL dense infiltrate improving  - re-dosing 40 mg IV lasix today, 12/26    Leukocytosis  - likely due to steroids, no recent fevers, O2 requirement improving daily and now on room air. Urinalysis negative.     Epistaxis  - discontinue nasal cannula - pt keeps putting it on for "comfort", but dry air is causing nose bleeding. Epistaxis has resolved w/ pressure.  - will give afrin if it recurs.    Elevated Troponin  NSTEMI, likely type II  cardiology appreciated  cont medical management, cont asa, eliquis, bb    IRINEO on CKD 3  hold ARB.  avoid nephrotoxins  renally adjusted meds  monitor BMP  appreciate Renal consult.     Chronic Atrial Fibrillation  continue metoprolol for rate control, eliquis for stroke ppx    History of CVA  continue ASA    DVT PPx  eliquis adjusted to 2.5 BID for renal fxn    Full code  palliative following    patient prefers to update family on his own  ** Needs a new PT re-evaluation 88 year old male with a PMH of CVA, CHF, HTN, afib on eliquis, hip fx s/p fall and prior intubation for HF exacerbation presenting to [facility] ED for SOB and CP. Pt noted to be hypoxemic with elevated sbp to 200's, mild inc in troponin level thought to be from demand ischemia.  Patient admitted to the ICU with acute hypoxic respiratory failure, HTN urgency, flash pulmonary edema and acute HF exacerbation (midrange EF of 45-50%), found to be Influenza A positive, s/p tamiflu. He was initially on BiPAP, nitro gtt for BP control and IV lasix, then transitioned to PO medications. Pt downgraded to the floor when tolerating NC. He is on a steroid taper.  Palliative care consulted when pt was in ICU.    Acute hypoxic respiratory failure secondary to  Multifocal pneumonia   Influenza A  Nasal congestion  currently weaned down to 1L NC, will cont to wean as able.  completed five days of tamiflu, now with superimposed bacterial pneumonia. CT chest obtained today with multifocal infiltrates. ID consulted, d/w Dr. Whitehead, pt started on cefepime to cover gram negative bacteria.  strep urine antigen negative. F/u legionella and MRSA/MSSA nasal PCR.  cont symbicort, nasal saline spray, continue steroid taper and Duonebs ATC  started tessalon perles atc, hycodan and robitussin prn for cough    Acute Pulmonary Edema  Hypertensive Urgency with Acute on Chronic Combined CHF  Hold ARB due to renal function, redose IV lasix as per Renal and Cardio. d/w Dr. Buckley and Dr. Pineda.  continue hydralazine 25 q8, metoprolol succ 50 daily and amlodipine 10 daily  continue to monitor BP as worsening renal function  cardiac follow up to optimize meds  monitor volume status, daily weights, I/Os, replete lytes as needed    Epistaxis  - discontinue nasal cannula - pt keeps putting it on for "comfort", but dry air is causing nose bleeding. Epistaxis has resolved w/ pressure.  - will give afrin if it recurs.    Elevated Troponin  NSTEMI, likely type II  cardiology appreciated  cont medical management, cont asa, eliquis, bb    IRINEO on CKD 3  hold ARB.  avoid nephrotoxins  renally adjusted meds  monitor BMP  appreciate Renal consult.     Chronic Atrial Fibrillation  continue metoprolol for rate control, eliquis for stroke ppx    History of CVA  continue ASA    DVT PPx  eliquis adjusted to 2.5 BID for renal fxn    Full code  palliative following    patient prefers to update family on his own  ** Needs a new PT re-evaluation

## 2022-12-27 NOTE — DIETITIAN NUTRITION RISK NOTIFICATION - ADDITIONAL COMMENTS/DIETITIAN RECOMMENDATIONS
Ensure Pudding High Protein 4oz PO BID(Provides 480kcal & 24grams of Protein) to optimize nutritional status

## 2022-12-27 NOTE — CONSULT NOTE ADULT - SUBJECTIVE AND OBJECTIVE BOX
HPI:   Patient is a 88y male with hx of CVA, CHF, HTN, afib, hip fx s/p fall. He preseted to  ER on   with SOB and CP, elevated sbp to 200's. He was treated with NIV, lasix and nitro. He tested +Influenza A with mild elevated troponin, elevated bnp and ?IRINEO. He completed renally dosed Tamiflu and prednisone taper, currently 20 mg daily. He cont to feel SOB and with rising WBC and hence ID eval requested. He denies any fevers or chills, has weak, unproductive cough    REVIEW OF SYSTEMS:  All other review of systems negative (Comprehensive ROS)    PAST MEDICAL & SURGICAL HISTORY:  Afib      Congestive heart failure (CHF)      CVA (cerebral vascular accident)      Hypertension, unspecified type      No significant past surgical history          Allergies    No Known Allergies    Intolerances        Antimicrobials Day #    cefTRIAXone   IVPB        Other Medications:  albuterol/ipratropium for Nebulization 3 milliLiter(s) Nebulizer every 6 hours  allopurinol 100 milliGRAM(s) Oral daily  apixaban 2.5 milliGRAM(s) Oral every 12 hours  aspirin enteric coated 81 milliGRAM(s) Oral daily  benzonatate 200 milliGRAM(s) Oral three times a day  budesonide 160 MICROgram(s)/formoterol 4.5 MICROgram(s) Inhaler 2 Puff(s) Inhalation two times a day  fluticasone propionate 50 MICROgram(s)/spray Nasal Spray 1 Spray(s) Both Nostrils two times a day  guaiFENesin  milliGRAM(s) Oral every 12 hours  hydrALAZINE 25 milliGRAM(s) Oral every 8 hours  hydrocodone/homatropine Syrup 5 milliLiter(s) Oral at bedtime  metoprolol succinate ER 50 milliGRAM(s) Oral daily  predniSONE   Tablet 20 milliGRAM(s) Oral daily  senna 2 Tablet(s) Oral at bedtime  sodium chloride 0.65% Nasal 1 Spray(s) Both Nostrils every 6 hours      FAMILY HISTORY:  No pertinent family history in first degree relatives        SOCIAL HISTORY:  Smoking:     ETOH:     Drug Use:     Single     T(F): 98.2 (22 @ 05:05), Max: 98.2 (22 @ 05:05)  HR: 88 (22 @ 09:07)  BP: 133/72 (22 @ 05:05)  RR: 17 (22 @ 05:05)  SpO2: 93% (22 @ 09:07)  Wt(kg): --    PHYSICAL EXAM:  General: alert, chronically ill appearing  Eyes:  anicteric, no conjunctival injection, no discharge  Oropharynx: no lesions or injection 	  Neck: supple, without adenopathy  Lungs: diminished at bases  Heart: regular rate and rhythm; no murmur, rubs or gallops  Abdomen: soft, nondistended, nontender, without mass or organomegaly  Skin: no lesions  Extremities: no clubbing, cyanosis, + edema  Neurologic: alert, follows simple commands    LAB RESULTS:                         )-----------( 292      ( 27 Dec 2022 06:40 )             39.2         143  |  103  |  64<H>  ----------------------------<  124<H>  3.8   |  29  |  1.72<H>    Ca    9.0      27 Dec 2022 06:40        Urinalysis Basic - ( 25 Dec 2022 22:09 )    Color: Yellow / Appearance: Clear / S.015 / pH: x  Gluc: x / Ketone: Negative  / Bili: Negative / Urobili: Negative   Blood: x / Protein: 30 mg/dL / Nitrite: Negative   Leuk Esterase: Negative / RBC: Negative /HPF / WBC Negative /HPF   Sq Epi: x / Non Sq Epi: x / Bacteria: x        MICROBIOLOGY REVIEWED:    RADIOLOGY REVIEWED:  < from: CT Chest No Cont (22 @ 08:57) >  Consolidations within bilateral lower lobes and patchy/nodular opacities   within all lobes likely representing multifocal pneumonia. Small left   pleural effusion.    < end of copied text >

## 2022-12-28 LAB
ANION GAP SERPL CALC-SCNC: 9 MMOL/L — SIGNIFICANT CHANGE UP (ref 5–17)
BUN SERPL-MCNC: 64 MG/DL — HIGH (ref 7–23)
CALCIUM SERPL-MCNC: 8.9 MG/DL — SIGNIFICANT CHANGE UP (ref 8.4–10.5)
CHLORIDE SERPL-SCNC: 102 MMOL/L — SIGNIFICANT CHANGE UP (ref 96–108)
CO2 SERPL-SCNC: 29 MMOL/L — SIGNIFICANT CHANGE UP (ref 22–31)
CREAT SERPL-MCNC: 1.55 MG/DL — HIGH (ref 0.5–1.3)
EGFR: 43 ML/MIN/1.73M2 — LOW
GLUCOSE SERPL-MCNC: 156 MG/DL — HIGH (ref 70–99)
HCT VFR BLD CALC: 37.7 % — LOW (ref 39–50)
HGB BLD-MCNC: 12.1 G/DL — LOW (ref 13–17)
LEGIONELLA AG UR QL: NEGATIVE — SIGNIFICANT CHANGE UP
MAGNESIUM SERPL-MCNC: 2.9 MG/DL — HIGH (ref 1.6–2.6)
MCHC RBC-ENTMCNC: 31.8 PG — SIGNIFICANT CHANGE UP (ref 27–34)
MCHC RBC-ENTMCNC: 32.1 GM/DL — SIGNIFICANT CHANGE UP (ref 32–36)
MCV RBC AUTO: 99.2 FL — SIGNIFICANT CHANGE UP (ref 80–100)
MRSA PCR RESULT.: SIGNIFICANT CHANGE UP
NRBC # BLD: 0 /100 WBCS — SIGNIFICANT CHANGE UP (ref 0–0)
PHOSPHATE SERPL-MCNC: 4.1 MG/DL — SIGNIFICANT CHANGE UP (ref 2.5–4.5)
PLATELET # BLD AUTO: 294 K/UL — SIGNIFICANT CHANGE UP (ref 150–400)
POTASSIUM SERPL-MCNC: 3.8 MMOL/L — SIGNIFICANT CHANGE UP (ref 3.5–5.3)
POTASSIUM SERPL-SCNC: 3.8 MMOL/L — SIGNIFICANT CHANGE UP (ref 3.5–5.3)
RBC # BLD: 3.8 M/UL — LOW (ref 4.2–5.8)
RBC # FLD: 13.9 % — SIGNIFICANT CHANGE UP (ref 10.3–14.5)
S AUREUS DNA NOSE QL NAA+PROBE: SIGNIFICANT CHANGE UP
SODIUM SERPL-SCNC: 140 MMOL/L — SIGNIFICANT CHANGE UP (ref 135–145)
WBC # BLD: 18.97 K/UL — HIGH (ref 3.8–10.5)
WBC # FLD AUTO: 18.97 K/UL — HIGH (ref 3.8–10.5)

## 2022-12-28 PROCEDURE — 99233 SBSQ HOSP IP/OBS HIGH 50: CPT

## 2022-12-28 RX ORDER — FUROSEMIDE 40 MG
40 TABLET ORAL DAILY
Refills: 0 | Status: DISCONTINUED | OUTPATIENT
Start: 2022-12-28 | End: 2023-01-02

## 2022-12-28 RX ADMIN — Medication 25 MILLIGRAM(S): at 23:04

## 2022-12-28 RX ADMIN — Medication 1 SPRAY(S): at 05:44

## 2022-12-28 RX ADMIN — Medication 600 MILLIGRAM(S): at 17:31

## 2022-12-28 RX ADMIN — Medication 1 SPRAY(S): at 17:30

## 2022-12-28 RX ADMIN — APIXABAN 2.5 MILLIGRAM(S): 2.5 TABLET, FILM COATED ORAL at 05:44

## 2022-12-28 RX ADMIN — Medication 3 MILLILITER(S): at 15:41

## 2022-12-28 RX ADMIN — Medication 200 MILLIGRAM(S): at 23:03

## 2022-12-28 RX ADMIN — Medication 40 MILLIGRAM(S): at 15:45

## 2022-12-28 RX ADMIN — CEFEPIME 100 MILLIGRAM(S): 1 INJECTION, POWDER, FOR SOLUTION INTRAMUSCULAR; INTRAVENOUS at 05:43

## 2022-12-28 RX ADMIN — Medication 200 MILLIGRAM(S): at 05:44

## 2022-12-28 RX ADMIN — Medication 1 SPRAY(S): at 23:20

## 2022-12-28 RX ADMIN — Medication 20 MILLIGRAM(S): at 05:44

## 2022-12-28 RX ADMIN — Medication 25 MILLIGRAM(S): at 13:58

## 2022-12-28 RX ADMIN — CEFEPIME 100 MILLIGRAM(S): 1 INJECTION, POWDER, FOR SOLUTION INTRAMUSCULAR; INTRAVENOUS at 17:31

## 2022-12-28 RX ADMIN — Medication 3 MILLILITER(S): at 11:26

## 2022-12-28 RX ADMIN — APIXABAN 2.5 MILLIGRAM(S): 2.5 TABLET, FILM COATED ORAL at 17:31

## 2022-12-28 RX ADMIN — BUDESONIDE AND FORMOTEROL FUMARATE DIHYDRATE 2 PUFF(S): 160; 4.5 AEROSOL RESPIRATORY (INHALATION) at 11:28

## 2022-12-28 RX ADMIN — Medication 1 SPRAY(S): at 12:14

## 2022-12-28 RX ADMIN — Medication 25 MILLIGRAM(S): at 05:44

## 2022-12-28 RX ADMIN — Medication 50 MILLIGRAM(S): at 05:44

## 2022-12-28 RX ADMIN — Medication 3 MILLILITER(S): at 04:44

## 2022-12-28 RX ADMIN — Medication 81 MILLIGRAM(S): at 12:14

## 2022-12-28 RX ADMIN — Medication 100 MILLIGRAM(S): at 12:14

## 2022-12-28 RX ADMIN — Medication 1 SPRAY(S): at 05:45

## 2022-12-28 RX ADMIN — SENNA PLUS 2 TABLET(S): 8.6 TABLET ORAL at 23:04

## 2022-12-28 RX ADMIN — Medication 600 MILLIGRAM(S): at 05:44

## 2022-12-28 NOTE — PROGRESS NOTE ADULT - SUBJECTIVE AND OBJECTIVE BOX
CC: f/u for [facility]    Patient reports feeling the same, congested, but more productive cough, family at bedside    REVIEW OF SYSTEMS: no fevers  All other review of systems negative (Comprehensive ROS)    Antimicrobials Day #    cefepime   IVPB      cefepime   IVPB 1000 milliGRAM(s) IV Intermittent every 12 hours    Other Medications Reviewed    T(F): 97.6 (12-28-22 @ 16:10), Max: 97.9 (12-27-22 @ 20:05)  HR: 79 (12-28-22 @ 16:10)  BP: 126/59 (12-28-22 @ 16:10)  RR: 18 (12-28-22 @ 16:10)  SpO2: 95% (12-28-22 @ 16:10)    PHYSICAL EXAM:  General: alert, no acute distress  Eyes:  anicteric, no conjunctival injection, no discharge  Oropharynx: no lesions or injection 	  Neck: supple, without adenopathy  Lungs: b/l coarse rhonchi  Heart: regular rate and rhythm; no murmur, rubs or gallops  Abdomen: soft, nondistended, nontender, without mass or organomegaly  Skin: no lesions  Extremities: no clubbing, cyanosis, or edema  Neurologic: alert, oriented, moves all extremities    LAB RESULTS:                        12.1   18.97 )-----------( 294      ( 28 Dec 2022 07:30 )             37.7     12-28    140  |  102  |  64<H>  ----------------------------<  156<H>  3.8   |  29  |  1.55<H>    Ca    8.9      28 Dec 2022 07:30  Phos  4.1     12-28  Mg     2.9     12-28            MICROBIOLOGY REVIEWED:  Culture - Blood (12.27.22 @ 11:45)   Specimen Source: .Blood Blood-Peripheral   Culture Results:   No growth to date.   RADIOLOGY REVIEWED:  < from: CT Chest No Cont (12.27.22 @ 08:57) >  IMPRESSION:    Consolidations within bilateral lower lobes and patchy/nodular opacities   within all lobes likely representing multifocal pneumonia. Small left   pleural effusion.    --- End of Report ---    < end of copied text >

## 2022-12-28 NOTE — PROGRESS NOTE ADULT - SUBJECTIVE AND OBJECTIVE BOX
Patient is a 88y old  Male who presents with a chief complaint of shortness of breath (28 Dec 2022 14:51)      24 HOUR EVENTS:  No overnight events reported.     SUBJECTIVE:  Patient seen and examined at bedside.     ALLERGIES:  No Known Allergies    MEDICATIONS  (STANDING):  albuterol/ipratropium for Nebulization 3 milliLiter(s) Nebulizer every 6 hours  allopurinol 100 milliGRAM(s) Oral daily  apixaban 2.5 milliGRAM(s) Oral every 12 hours  aspirin enteric coated 81 milliGRAM(s) Oral daily  benzonatate 200 milliGRAM(s) Oral three times a day  budesonide 160 MICROgram(s)/formoterol 4.5 MICROgram(s) Inhaler 2 Puff(s) Inhalation two times a day  cefepime   IVPB      cefepime   IVPB 1000 milliGRAM(s) IV Intermittent every 12 hours  fluticasone propionate 50 MICROgram(s)/spray Nasal Spray 1 Spray(s) Both Nostrils two times a day  furosemide    Tablet 40 milliGRAM(s) Oral daily  guaiFENesin  milliGRAM(s) Oral every 12 hours  hydrALAZINE 25 milliGRAM(s) Oral every 8 hours  hydrocodone/homatropine Syrup 5 milliLiter(s) Oral at bedtime  metoprolol succinate ER 50 milliGRAM(s) Oral daily  senna 2 Tablet(s) Oral at bedtime  sodium chloride 0.65% Nasal 1 Spray(s) Both Nostrils every 6 hours    MEDICATIONS  (PRN):    Vital Signs Last 24 Hrs  T(F): 97.2 (28 Dec 2022 05:28), Max: 97.9 (27 Dec 2022 20:05)  HR: 76 (28 Dec 2022 11:30) (76 - 97)  BP: 134/69 (28 Dec 2022 05:28) (118/54 - 145/64)  RR: 16 (28 Dec 2022 05:28) (16 - 18)  SpO2: 93% (28 Dec 2022 11:30) (92% - 98%)  I&O's Summary    27 Dec 2022 07:01  -  28 Dec 2022 07:00  --------------------------------------------------------  IN: 1330 mL / OUT: 700 mL / NET: 630 mL    28 Dec 2022 07:01  -  28 Dec 2022 15:22  --------------------------------------------------------  IN: 300 mL / OUT: 400 mL / NET: -100 mL      PHYSICAL EXAM:  General: NAD, Awake and alert  ENT: Moist mucous membranes, no thrush  Neck: Supple, No JVD  Lungs: Clear to auscultation bilaterally, good air entry, non-labored breathing  Cardio: RRR, S1/S2, No murmur  Abdomen: Soft, Nontender, Nondistended; Bowel sounds present  Extremities: No calf tenderness, No pitting edema, no contractures.    LABS:                        12.1   18.97 )-----------( 294      ( 28 Dec 2022 07:30 )             37.7         140  |  102  |  64  ----------------------------<  156  3.8   |  29  |  1.55    Ca    8.9      28 Dec 2022 07:30  Phos  4.1       Mg     2.9         Procalcitonin, Serum: 0.20 ng/mL (22 @ 05:00)      Urinalysis Basic - ( 25 Dec 2022 22:09 )    Color: Yellow / Appearance: Clear / S.015 / pH: x  Gluc: x / Ketone: Negative  / Bili: Negative / Urobili: Negative   Blood: x / Protein: 30 mg/dL / Nitrite: Negative   Leuk Esterase: Negative / RBC: Negative /HPF / WBC Negative /HPF   Sq Epi: x / Non Sq Epi: x / Bacteria: x        Culture - Blood (collected 27 Dec 2022 11:45)  Source: .Blood Blood-Peripheral  Preliminary Report (28 Dec 2022 15:09):    No growth to date.    Culture - Blood (collected 27 Dec 2022 11:40)  Source: .Blood Blood-Peripheral  Preliminary Report (28 Dec 2022 15:09):    No growth to date.        RADIOLOGY & ADDITIONAL TESTS:    Care Discussed with Consultants/Other Providers:    Patient is a 88y old  Male who presents with a chief complaint of shortness of breath (28 Dec 2022 14:51)      24 HOUR EVENTS:  No overnight events reported.     SUBJECTIVE:  Patient seen and examined at bedside. shortness of breath is improving. Feels weak overall.     ALLERGIES:  No Known Allergies    MEDICATIONS  (STANDING):  albuterol/ipratropium for Nebulization 3 milliLiter(s) Nebulizer every 6 hours  allopurinol 100 milliGRAM(s) Oral daily  apixaban 2.5 milliGRAM(s) Oral every 12 hours  aspirin enteric coated 81 milliGRAM(s) Oral daily  benzonatate 200 milliGRAM(s) Oral three times a day  budesonide 160 MICROgram(s)/formoterol 4.5 MICROgram(s) Inhaler 2 Puff(s) Inhalation two times a day  cefepime   IVPB      cefepime   IVPB 1000 milliGRAM(s) IV Intermittent every 12 hours  fluticasone propionate 50 MICROgram(s)/spray Nasal Spray 1 Spray(s) Both Nostrils two times a day  furosemide    Tablet 40 milliGRAM(s) Oral daily  guaiFENesin  milliGRAM(s) Oral every 12 hours  hydrALAZINE 25 milliGRAM(s) Oral every 8 hours  hydrocodone/homatropine Syrup 5 milliLiter(s) Oral at bedtime  metoprolol succinate ER 50 milliGRAM(s) Oral daily  senna 2 Tablet(s) Oral at bedtime  sodium chloride 0.65% Nasal 1 Spray(s) Both Nostrils every 6 hours    MEDICATIONS  (PRN):    Vital Signs Last 24 Hrs  T(F): 97.2 (28 Dec 2022 05:28), Max: 97.9 (27 Dec 2022 20:05)  HR: 76 (28 Dec 2022 11:30) (76 - 97)  BP: 134/69 (28 Dec 2022 05:28) (118/54 - 145/64)  RR: 16 (28 Dec 2022 05:28) (16 - 18)  SpO2: 93% (28 Dec 2022 11:30) (92% - 98%)  I&O's Summary    27 Dec 2022 07:01  -  28 Dec 2022 07:00  --------------------------------------------------------  IN: 1330 mL / OUT: 700 mL / NET: 630 mL    28 Dec 2022 07:01  -  28 Dec 2022 15:22  --------------------------------------------------------  IN: 300 mL / OUT: 400 mL / NET: -100 mL      PHYSICAL EXAM:  General: NAD, Awake and alert  ENT: Moist mucous membranes, no thrush  Neck: Supple, No JVD  Lungs: Clear to auscultation bilaterally, good air entry, non-labored breathing  Cardio: RRR, S1/S2, No murmur  Abdomen: Soft, Nontender, Nondistended; Bowel sounds present  Extremities: No calf tenderness, No pitting edema, no contractures.    LABS:                        12.1   18.97 )-----------( 294      ( 28 Dec 2022 07:30 )             37.7         140  |  102  |  64  ----------------------------<  156  3.8   |  29  |  1.55    Ca    8.9      28 Dec 2022 07:30  Phos  4.1       Mg     2.9         Procalcitonin, Serum: 0.20 ng/mL (22 @ 05:00)      Urinalysis Basic - ( 25 Dec 2022 22:09 )    Color: Yellow / Appearance: Clear / S.015 / pH: x  Gluc: x / Ketone: Negative  / Bili: Negative / Urobili: Negative   Blood: x / Protein: 30 mg/dL / Nitrite: Negative   Leuk Esterase: Negative / RBC: Negative /HPF / WBC Negative /HPF   Sq Epi: x / Non Sq Epi: x / Bacteria: x        Culture - Blood (collected 27 Dec 2022 11:45)  Source: .Blood Blood-Peripheral  Preliminary Report (28 Dec 2022 15:09):    No growth to date.    Culture - Blood (collected 27 Dec 2022 11:40)  Source: .Blood Blood-Peripheral  Preliminary Report (28 Dec 2022 15:09):    No growth to date.        RADIOLOGY & ADDITIONAL TESTS:    Care Discussed with Consultants/Other Providers:

## 2022-12-28 NOTE — PROGRESS NOTE ADULT - ASSESSMENT
88y male with hx of CVA, CHF, HTN, afib, hip fx s/p fall. He preseted to  ER on 12/18  with SOB and CP, elevated sbp to 200's. He was treated with NIV, lasix and nitro. He tested +Influenza A with mild elevated troponin, elevated bnp and ?IRINEO. He completed renally dosed Tamiflu and prednisone taper, currently 20 mg daily. He cont to feel SOB and with rising WBC and hence ID eval requested.   He denies any fevers or chills, has weak, unproductive cough  CT chest with multifocal infiltrates  cough seems more productive today  Suggest  Cont Cefepime to cover for HCAP  check MRSA and legionella screen  Monitor WBC and fevers trend  Aspiration precautions  Diuresis as per renal and cardiology  d/w pt's family at bedside

## 2022-12-28 NOTE — PROGRESS NOTE ADULT - SUBJECTIVE AND OBJECTIVE BOX
No distress, on RA    Vital Signs Last 24 Hrs  T(C): 36.2 (12-28-22 @ 19:55), Max: 36.4 (12-28-22 @ 00:49)  T(F): 97.2 (12-28-22 @ 19:55), Max: 97.6 (12-28-22 @ 00:49)  HR: 76 (12-28-22 @ 19:55) (76 - 97)  BP: 134/61 (12-28-22 @ 19:55) (126/59 - 145/64)  RR: 16 (12-28-22 @ 19:55) (16 - 18)  SpO2: 96% (12-28-22 @ 19:55) (92% - 98%)    I&O's Detail    27 Dec 2022 07:01  -  28 Dec 2022 07:00  --------------------------------------------------------  IN:    IV PiggyBack: 50 mL    Oral Fluid: 1280 mL  Total IN: 1330 mL    OUT:    Voided (mL): 700 mL  Total OUT: 700 mL    Total NET: 630 mL    28 Dec 2022 07:01  -  28 Dec 2022 22:37  --------------------------------------------------------  IN:    Oral Fluid: 780 mL  Total IN: 780 mL    OUT:    Voided (mL): 700 mL  Total OUT: 700 mL    Total NET: 80 mL    s1s2  b/l air entry  soft, ND  + edema, stasis changes                                 12.1   18.97 )-----------( 294      ( 28 Dec 2022 07:30 )             37.7     28 Dec 2022 07:30    140    |  102    |  64     ----------------------------<  156    3.8     |  29     |  1.55     Ca    8.9        28 Dec 2022 07:30  Phos  4.1       28 Dec 2022 07:30  Mg     2.9       28 Dec 2022 07:30    Culture - Blood (collected 27 Dec 2022 11:45)  Source: .Blood Blood-Peripheral  Preliminary Report (28 Dec 2022 15:09):    No growth to date.    Culture - Blood (collected 27 Dec 2022 11:40)  Source: .Blood Blood-Peripheral  Preliminary Report (28 Dec 2022 15:09):    No growth to date.    albuterol/ipratropium for Nebulization 3 milliLiter(s) Nebulizer every 6 hours  allopurinol 100 milliGRAM(s) Oral daily  apixaban 2.5 milliGRAM(s) Oral every 12 hours  aspirin enteric coated 81 milliGRAM(s) Oral daily  benzonatate 200 milliGRAM(s) Oral three times a day  budesonide 160 MICROgram(s)/formoterol 4.5 MICROgram(s) Inhaler 2 Puff(s) Inhalation two times a day  cefepime   IVPB      cefepime   IVPB 1000 milliGRAM(s) IV Intermittent every 12 hours  fluticasone propionate 50 MICROgram(s)/spray Nasal Spray 1 Spray(s) Both Nostrils two times a day  furosemide    Tablet 40 milliGRAM(s) Oral daily  guaiFENesin  milliGRAM(s) Oral every 12 hours  hydrALAZINE 25 milliGRAM(s) Oral every 8 hours  hydrocodone/homatropine Syrup 5 milliLiter(s) Oral at bedtime  metoprolol succinate ER 50 milliGRAM(s) Oral daily  senna 2 Tablet(s) Oral at bedtime  sodium chloride 0.65% Nasal 1 Spray(s) Both Nostrils every 6 hours    A/P:    Flu A, b/l PNA  CM, mod MR, TR, EF 45-50%  Hemodynamic IRINEO/CKD 3 (Cr 1.16 - 3/10/22)  Stable  UA bland  Renal SONO w/o hydro  No objection Lasix as needed  F/u [facility]     [phone]

## 2022-12-28 NOTE — PROGRESS NOTE ADULT - ASSESSMENT
88 year old male with a PMH of CVA, CHF, HTN, afib on eliquis, hip fx s/p fall and prior intubation for HF exacerbation presenting to [facility] ED for SOB and CP. Pt noted to be hypoxemic with elevated sbp to 200's, mild inc in troponin level thought to be from demand ischemia.  Patient admitted to the ICU with acute hypoxic respiratory failure, HTN urgency, flash pulmonary edema and acute HF exacerbation (midrange EF of 45-50%), found to be Influenza A positive, s/p tamiflu. He was initially on BiPAP, nitro gtt for BP control and IV lasix, then transitioned to PO medications. Pt downgraded to the floor when tolerating NC. He is on a steroid taper.  Palliative care consulted when pt was in ICU.    Acute hypoxic respiratory failure secondary to  Multifocal pneumonia   Influenza A  Nasal congestion  currently weaned down to 1L NC, will cont to wean as able.  completed five days of tamiflu, now with superimposed bacterial pneumonia. CT chest obtained today with multifocal infiltrates. ID consulted, d/w Dr. Whitehead, pt started on cefepime to cover gram negative bacteria.  strep urine antigen negative. F/u legionella and MRSA/MSSA nasal PCR.  cont symbicort, nasal saline spray, continue steroid taper and Duonebs ATC  started tessalon perles atc, hycodan and robitussin prn for cough    Acute Pulmonary Edema  Hypertensive Urgency with Acute on Chronic Combined CHF  Hold ARB due to renal function, redose IV lasix as per Renal and Cardio. d/w Dr. Buckley and Dr. Pineda.  continue hydralazine 25 q8, metoprolol succ 50 daily and amlodipine 10 daily  continue to monitor BP as worsening renal function  cardiac follow up to optimize meds  monitor volume status, daily weights, I/Os, replete lytes as needed    Epistaxis  - discontinue nasal cannula - pt keeps putting it on for "comfort", but dry air is causing nose bleeding. Epistaxis has resolved w/ pressure.  - will give afrin if it recurs.    Elevated Troponin  NSTEMI, likely type II  cardiology appreciated  cont medical management, cont asa, eliquis, bb    IRINEO on CKD 3  hold ARB.  avoid nephrotoxins  renally adjusted meds  monitor BMP  appreciate Renal consult.     Chronic Atrial Fibrillation  continue metoprolol for rate control, eliquis for stroke ppx    History of CVA  continue ASA    DVT PPx  eliquis adjusted to 2.5 BID for renal fxn    Full code  palliative following    patient prefers to update family on his own  ** Needs a new PT re-evaluation 88 year old male with a PMH of CVA, CHF, HTN, afib on eliquis, hip fx s/p fall and prior intubation for HF exacerbation presenting to [facility] ED for SOB and CP. Pt noted to be hypoxemic with elevated sbp to 200's, mild inc in troponin level thought to be from demand ischemia.  Patient admitted to the ICU with acute hypoxic respiratory failure, HTN urgency, flash pulmonary edema and acute HF exacerbation (midrange EF of 45-50%), found to be Influenza A positive, s/p tamiflu. He was initially on BiPAP, nitro gtt for BP control and IV lasix, then transitioned to PO medications. Pt downgraded to the floor when tolerating NC. He is on a steroid taper.  Because of leukocytosis and persistent SOB, CT chest was pursued that showed multifocal pneumonia. ID consulted and pt started on IV cefepime.  Palliative care consulted when pt was in ICU.    Multifocal pneumonia   Influenza A  Acute hypoxic respiratory failure- resolved.  currently weaned to room air.  completed five days of tamiflu, now with superimposed bacterial pneumonia. CT chest obtained today with multifocal infiltrates. ID consulted, d/w Dr. Whitehead, pt started on cefepime to cover gram negative bacteria. F/u end of treatment.  strep urine antigen negative. F/u legionella and MRSA/MSSA nasal PCR.  cont symbicort, nasal saline spray, continue steroid taper and Duonebs ATC  started tessalon perles atc, hycodan and robitussin prn for cough  percussion bed    Acute Pulmonary Edema  Hypertensive Urgency with Acute on Chronic Combined CHF  Hold ARB due to renal function  start daily lasix 40 mg qd - follow renal fxn  continue hydralazine 25 mg q8h, metoprolol succ 50 mg daily  cardiac follow up to optimize meds  monitor volume status, daily weights, I/Os, replete lytes as needed    Elevated Troponin  NSTEMI, likely type II  cardiology appreciated  cont medical management, cont asa, eliquis, bb    IRINEO on CKD 3  hold ARB.  avoid nephrotoxins  renally adjusted meds  monitor BMP  appreciate Renal consult - d/w Dr. Pineda. Cont lasix for now- May need to accept a higher creatinine.      Chronic Atrial Fibrillation  continue metoprolol for rate control, eliquis for stroke ppx    History of CVA  continue ASA    DVT PPx  eliquis adjusted to 2.5 BID for renal fxn    Full code  palliative following    patient prefers to update family on his own  ** Needs a new PT re-evaluation

## 2022-12-28 NOTE — PROGRESS NOTE ADULT - SUBJECTIVE AND OBJECTIVE BOX
Time of Visit:  Patient seen and examined. pat is sitting in chair comfortable     MEDICATIONS  (STANDING):  albuterol/ipratropium for Nebulization 3 milliLiter(s) Nebulizer every 6 hours  allopurinol 100 milliGRAM(s) Oral daily  apixaban 2.5 milliGRAM(s) Oral every 12 hours  aspirin enteric coated 81 milliGRAM(s) Oral daily  benzonatate 200 milliGRAM(s) Oral three times a day  budesonide 160 MICROgram(s)/formoterol 4.5 MICROgram(s) Inhaler 2 Puff(s) Inhalation two times a day  cefepime   IVPB      cefepime   IVPB 1000 milliGRAM(s) IV Intermittent every 12 hours  fluticasone propionate 50 MICROgram(s)/spray Nasal Spray 1 Spray(s) Both Nostrils two times a day  furosemide    Tablet 40 milliGRAM(s) Oral daily  guaiFENesin  milliGRAM(s) Oral every 12 hours  hydrALAZINE 25 milliGRAM(s) Oral every 8 hours  hydrocodone/homatropine Syrup 5 milliLiter(s) Oral at bedtime  metoprolol succinate ER 50 milliGRAM(s) Oral daily  senna 2 Tablet(s) Oral at bedtime  sodium chloride 0.65% Nasal 1 Spray(s) Both Nostrils every 6 hours      MEDICATIONS  (PRN):       Medications up to date at time of exam.      PHYSICAL EXAMINATION:  Patient has no new complaints.  GENERAL: The patient is a well-developed, well-nourished, in no apparent distress.     Vital Signs Last 24 Hrs  T(C): 36.2 (28 Dec 2022 05:28), Max: 36.6 (27 Dec 2022 20:05)  T(F): 97.2 (28 Dec 2022 05:28), Max: 97.9 (27 Dec 2022 20:05)  HR: 76 (28 Dec 2022 11:30) (76 - 97)  BP: 134/69 (28 Dec 2022 05:28) (118/54 - 145/64)  BP(mean): --  RR: 16 (28 Dec 2022 05:28) (16 - 18)  SpO2: 93% (28 Dec 2022 11:30) (92% - 98%)    Parameters below as of 28 Dec 2022 11:30  Patient On (Oxygen Delivery Method): room air       (if applicable)    Chest Tube (if applicable)    HEENT: Head is normocephalic and atraumatic. Extraocular muscles are intact. Mucous membranes are moist.     NECK: Supple, no palpable adenopathy.    LUNGS: Clear to auscultation, no wheezing, rales, or rhonchi.    HEART: Regular rate and rhythm without murmur.    ABDOMEN: Soft, nontender, and nondistended.  No hepatosplenomegaly is noted.    EXTREMITIES: Without any cyanosis, clubbing, rash, lesions or edema.    NEUROLOGIC: Awake, alert, oriented, grossly intact    SKIN: Warm, dry, good turgor.      LABS:                        12.1   18.97 )-----------( 294      ( 28 Dec 2022 07:30 )             37.7     12-28    140  |  102  |  64<H>  ----------------------------<  156<H>  3.8   |  29  |  1.55<H>    Ca    8.9      28 Dec 2022 07:30  Phos  4.1     12-28  Mg     2.9     12-28                  Serum Pro-Brain Natriuretic Peptide: 3188 pg/mL (12-26-22 @ 05:00)      Procalcitonin, Serum: 0.20 ng/mL (12-26-22 @ 05:00)      MICROBIOLOGY: (if applicable)    RADIOLOGY & ADDITIONAL STUDIES:  EKG:   CXR:  ECHO:    IMPRESSION: 88y Male PAST MEDICAL & SURGICAL HISTORY:  Afib      Congestive heart failure (CHF)      CVA (cerebral vascular accident)      Hypertension, unspecified type      No significant past surgical history       p/w         IMP: This is an 88 year old man  with a  CVA, CHF, HTN, afib on eliquis, hip fx s/p fall and prior intubation for HF exacerbation presenting to [facility] ED for SOB and CP. Pt noted to be hypoxemic with elevated sbp to 200's. Pt tx with NIV, lasix and nitro with +response. ICU team consulted. Influenza A + with mild elevated troponin, elevated bnp and ?IRINEO on labs. When we arrived pt was alert and oriented, with O2 sat 100%, rate controlled afib on monitor and sbp 140. Pt trial off bipap with NC ~4 liters without desaturation or respiratory distress. ABG obtained with the following results: 7.37 /  45 / 87 / 26 97% on 4 liters NC. Troponin mildly elevated and has stabilized with no major change and ekg is without acute ischemia noted currently appearing to be demand ischemia at this time (also he is on AC at home), cardiology contacted by ED MD. BNP elevated to 6k. Given pt appears comfortable and states that he is "feeling better" along with his neurological, respiratory (off NIV).   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

## 2022-12-29 LAB
ANION GAP SERPL CALC-SCNC: 8 MMOL/L — SIGNIFICANT CHANGE UP (ref 5–17)
BASOPHILS # BLD AUTO: 0 K/UL — SIGNIFICANT CHANGE UP (ref 0–0.2)
BASOPHILS NFR BLD AUTO: 0 % — SIGNIFICANT CHANGE UP (ref 0–2)
BUN SERPL-MCNC: 66 MG/DL — HIGH (ref 7–23)
CALCIUM SERPL-MCNC: 9.1 MG/DL — SIGNIFICANT CHANGE UP (ref 8.4–10.5)
CHLORIDE SERPL-SCNC: 102 MMOL/L — SIGNIFICANT CHANGE UP (ref 96–108)
CO2 SERPL-SCNC: 30 MMOL/L — SIGNIFICANT CHANGE UP (ref 22–31)
CREAT SERPL-MCNC: 1.53 MG/DL — HIGH (ref 0.5–1.3)
EGFR: 44 ML/MIN/1.73M2 — LOW
EOSINOPHIL # BLD AUTO: 0 K/UL — SIGNIFICANT CHANGE UP (ref 0–0.5)
EOSINOPHIL NFR BLD AUTO: 0 % — SIGNIFICANT CHANGE UP (ref 0–6)
GLUCOSE SERPL-MCNC: 133 MG/DL — HIGH (ref 70–99)
HCT VFR BLD CALC: 36.4 % — LOW (ref 39–50)
HGB BLD-MCNC: 11.9 G/DL — LOW (ref 13–17)
LYMPHOCYTES # BLD AUTO: 1.61 K/UL — SIGNIFICANT CHANGE UP (ref 1–3.3)
LYMPHOCYTES # BLD AUTO: 9 % — LOW (ref 13–44)
MANUAL SMEAR VERIFICATION: SIGNIFICANT CHANGE UP
MCHC RBC-ENTMCNC: 32.2 PG — SIGNIFICANT CHANGE UP (ref 27–34)
MCHC RBC-ENTMCNC: 32.7 GM/DL — SIGNIFICANT CHANGE UP (ref 32–36)
MCV RBC AUTO: 98.4 FL — SIGNIFICANT CHANGE UP (ref 80–100)
MONOCYTES # BLD AUTO: 2.68 K/UL — HIGH (ref 0–0.9)
MONOCYTES NFR BLD AUTO: 15 % — HIGH (ref 2–14)
MYELOCYTES NFR BLD: 5 % — HIGH (ref 0–0)
NEUTROPHILS # BLD AUTO: 12.15 K/UL — HIGH (ref 1.8–7.4)
NEUTROPHILS NFR BLD AUTO: 65 % — SIGNIFICANT CHANGE UP (ref 43–77)
NEUTS BAND # BLD: 3 % — SIGNIFICANT CHANGE UP (ref 0–8)
NRBC # BLD: 0 /100 — SIGNIFICANT CHANGE UP (ref 0–0)
PLAT MORPH BLD: NORMAL — SIGNIFICANT CHANGE UP
PLATELET # BLD AUTO: 296 K/UL — SIGNIFICANT CHANGE UP (ref 150–400)
POTASSIUM SERPL-MCNC: 3.8 MMOL/L — SIGNIFICANT CHANGE UP (ref 3.5–5.3)
POTASSIUM SERPL-SCNC: 3.8 MMOL/L — SIGNIFICANT CHANGE UP (ref 3.5–5.3)
RBC # BLD: 3.7 M/UL — LOW (ref 4.2–5.8)
RBC # FLD: 13.8 % — SIGNIFICANT CHANGE UP (ref 10.3–14.5)
RBC BLD AUTO: NORMAL — SIGNIFICANT CHANGE UP
SODIUM SERPL-SCNC: 140 MMOL/L — SIGNIFICANT CHANGE UP (ref 135–145)
VARIANT LYMPHS # BLD: 3 % — SIGNIFICANT CHANGE UP (ref 0–6)
WBC # BLD: 17.87 K/UL — HIGH (ref 3.8–10.5)
WBC # FLD AUTO: 17.87 K/UL — HIGH (ref 3.8–10.5)

## 2022-12-29 PROCEDURE — 99233 SBSQ HOSP IP/OBS HIGH 50: CPT

## 2022-12-29 RX ORDER — TIOTROPIUM BROMIDE 18 UG/1
2 CAPSULE ORAL; RESPIRATORY (INHALATION) DAILY
Refills: 0 | Status: DISCONTINUED | OUTPATIENT
Start: 2022-12-29 | End: 2023-01-03

## 2022-12-29 RX ORDER — ALBUTEROL 90 UG/1
2 AEROSOL, METERED ORAL EVERY 6 HOURS
Refills: 0 | Status: DISCONTINUED | OUTPATIENT
Start: 2022-12-29 | End: 2023-01-03

## 2022-12-29 RX ADMIN — APIXABAN 2.5 MILLIGRAM(S): 2.5 TABLET, FILM COATED ORAL at 17:21

## 2022-12-29 RX ADMIN — Medication 1 SPRAY(S): at 13:16

## 2022-12-29 RX ADMIN — Medication 50 MILLIGRAM(S): at 06:23

## 2022-12-29 RX ADMIN — CEFEPIME 100 MILLIGRAM(S): 1 INJECTION, POWDER, FOR SOLUTION INTRAMUSCULAR; INTRAVENOUS at 06:25

## 2022-12-29 RX ADMIN — Medication 600 MILLIGRAM(S): at 06:23

## 2022-12-29 RX ADMIN — Medication 5 MILLIGRAM(S): at 20:35

## 2022-12-29 RX ADMIN — Medication 200 MILLIGRAM(S): at 06:23

## 2022-12-29 RX ADMIN — Medication 25 MILLIGRAM(S): at 06:24

## 2022-12-29 RX ADMIN — Medication 1 SPRAY(S): at 17:21

## 2022-12-29 RX ADMIN — Medication 100 MILLIGRAM(S): at 13:15

## 2022-12-29 RX ADMIN — Medication 1 SPRAY(S): at 06:24

## 2022-12-29 RX ADMIN — Medication 25 MILLIGRAM(S): at 13:15

## 2022-12-29 RX ADMIN — Medication 200 MILLIGRAM(S): at 13:15

## 2022-12-29 RX ADMIN — Medication 40 MILLIGRAM(S): at 06:24

## 2022-12-29 RX ADMIN — Medication 1 SPRAY(S): at 17:22

## 2022-12-29 RX ADMIN — CEFEPIME 100 MILLIGRAM(S): 1 INJECTION, POWDER, FOR SOLUTION INTRAMUSCULAR; INTRAVENOUS at 17:22

## 2022-12-29 RX ADMIN — APIXABAN 2.5 MILLIGRAM(S): 2.5 TABLET, FILM COATED ORAL at 06:23

## 2022-12-29 RX ADMIN — Medication 10 MILLIGRAM(S): at 06:24

## 2022-12-29 RX ADMIN — Medication 600 MILLIGRAM(S): at 17:21

## 2022-12-29 RX ADMIN — Medication 1 SPRAY(S): at 06:22

## 2022-12-29 RX ADMIN — Medication 81 MILLIGRAM(S): at 13:15

## 2022-12-29 NOTE — PROGRESS NOTE ADULT - SUBJECTIVE AND OBJECTIVE BOX
Time of Visit:  Patient seen and examined. sitting in chair comfortable     MEDICATIONS  (STANDING):  albuterol/ipratropium for Nebulization 3 milliLiter(s) Nebulizer every 6 hours  allopurinol 100 milliGRAM(s) Oral daily  apixaban 2.5 milliGRAM(s) Oral every 12 hours  aspirin enteric coated 81 milliGRAM(s) Oral daily  benzonatate 200 milliGRAM(s) Oral three times a day  budesonide 160 MICROgram(s)/formoterol 4.5 MICROgram(s) Inhaler 2 Puff(s) Inhalation two times a day  cefepime   IVPB      cefepime   IVPB 1000 milliGRAM(s) IV Intermittent every 12 hours  fluticasone propionate 50 MICROgram(s)/spray Nasal Spray 1 Spray(s) Both Nostrils two times a day  furosemide    Tablet 40 milliGRAM(s) Oral daily  guaiFENesin  milliGRAM(s) Oral every 12 hours  hydrALAZINE 25 milliGRAM(s) Oral every 8 hours  hydrocodone/homatropine Syrup 5 milliLiter(s) Oral at bedtime  metoprolol succinate ER 50 milliGRAM(s) Oral daily  predniSONE   Tablet 10 milliGRAM(s) Oral daily  senna 2 Tablet(s) Oral at bedtime  sodium chloride 0.65% Nasal 1 Spray(s) Both Nostrils every 6 hours      MEDICATIONS  (PRN):       Medications up to date at time of exam.      PHYSICAL EXAMINATION:  Patient has no new complaints.  GENERAL: The patient is a well-developed, well-nourished, in no apparent distress.     Vital Signs Last 24 Hrs  T(C): 36.6 (29 Dec 2022 05:25), Max: 36.6 (29 Dec 2022 05:25)  T(F): 97.8 (29 Dec 2022 05:25), Max: 97.8 (29 Dec 2022 05:25)  HR: 80 (29 Dec 2022 05:25) (76 - 87)  BP: 126/76 (29 Dec 2022 05:25) (126/59 - 134/61)  BP(mean): --  RR: 16 (29 Dec 2022 05:25) (16 - 18)  SpO2: 98% (29 Dec 2022 05:25) (92% - 98%)    Parameters below as of 29 Dec 2022 05:25  Patient On (Oxygen Delivery Method): nasal cannula w/ humidification  O2 Flow (L/min): 3     (if applicable)    Chest Tube (if applicable)    HEENT: Head is normocephalic and atraumatic. Extraocular muscles are intact. Mucous membranes are moist.     NECK: Supple, no palpable adenopathy.    LUNGS: Clear to auscultation, no wheezing, rales, or rhonchi.    HEART: Regular rate and rhythm without murmur.    ABDOMEN: Soft, nontender, and nondistended.  No hepatosplenomegaly is noted.    EXTREMITIES: Without any cyanosis, clubbing, rash, lesions or edema.    NEUROLOGIC: Awake, alert, oriented, grossly intact    SKIN: Warm, dry, good turgor.      LABS:                        11.9   17.87 )-----------( 296      ( 29 Dec 2022 07:45 )             36.4     12-29    140  |  102  |  66<H>  ----------------------------<  133<H>  3.8   |  30  |  1.53<H>    Ca    9.1      29 Dec 2022 07:45  Phos  4.1     12-28  Mg     2.9     12-28                          MICROBIOLOGY: (if applicable)    RADIOLOGY & ADDITIONAL STUDIES:  EKG:   CXR:  ECHO:    IMPRESSION: 88y Male PAST MEDICAL & SURGICAL HISTORY:  Afib      Congestive heart failure (CHF)      CVA (cerebral vascular accident)      Hypertension, unspecified type      No significant past surgical history       p/w         IMP: This is an 88 year old man  with a  CVA, CHF, HTN, afib on eliquis, hip fx s/p fall and prior intubation for HF exacerbation presenting to [facility] ED for SOB and CP. Pt noted to be hypoxemic with elevated sbp to 200's. Pt tx with NIV, lasix and nitro with +response. ICU team consulted. Influenza A + with mild elevated troponin, elevated bnp and ?IRINEO on labs. When we arrived pt was alert and oriented, with O2 sat 100%, rate controlled afib on monitor and sbp 140. Pt trial off bipap with NC ~4 liters without desaturation or respiratory distress. ABG obtained with the following results: 7.37 /  45 / 87 / 26 97% on 4 liters NC. Troponin mildly elevated and has stabilized with no major change and ekg is without acute ischemia noted currently appearing to be demand ischemia at this time (also he is on AC at home), cardiology contacted by ED MD. BNP elevated to 6k. Given pt appears comfortable and states that he is "feeling better" along with his neurological, respiratory (off NIV).   CT chest showing infiltrate on iv antibx as per ID,    Assessment    -  PNA   -  Influenza A  -  Acute Pulmonary Edema - Hypertensive urgency with acute on chronic combined systolic/diastolic CHF   -  Elevated trop, r/o NSTEMI  -  Underlying Afib on Eliquis, HTN, CVA,   -  Increasing Cr / IRINEO normal baseline    Plan     - Continue antibx asm per ID   - Off  Isolation   - S/P Tamiflu   - Off supp O2 , tolerating room air  - Symbicort   - Prednisone   - Optimize cardiac meds   - BP better   - Anticoag

## 2022-12-29 NOTE — PROGRESS NOTE ADULT - SUBJECTIVE AND OBJECTIVE BOX
No distress, on RA    Vital Signs Last 24 Hrs  T(C): 36.4 (12-29-22 @ 15:48), Max: 36.6 (12-29-22 @ 05:25)  T(F): 97.6 (12-29-22 @ 15:48), Max: 97.8 (12-29-22 @ 05:25)  HR: 85 (12-29-22 @ 15:48) (76 - 85)  BP: 149/66 (12-29-22 @ 15:48) (126/59 - 149/66)  RR: 17 (12-29-22 @ 15:48) (16 - 18)  SpO2: 98% (12-29-22 @ 15:48) (95% - 98%)    I&O's Detail    28 Dec 2022 07:01  -  29 Dec 2022 07:00  --------------------------------------------------------  IN:    Oral Fluid: 780 mL  Total IN: 780 mL    OUT:    Voided (mL): 850 mL  Total OUT: 850 mL    Total NET: -70 mL    29 Dec 2022 07:01  -  29 Dec 2022 16:05  --------------------------------------------------------  IN:    Oral Fluid: 300 mL  Total IN: 300 mL    OUT:    Voided (mL): 500 mL  Total OUT: 500 mL    Total NET: -200 mL    s1s2  b/l air entry  soft, ND  + edema, stasis changes                                         11.9   17.87 )-----------( 296      ( 29 Dec 2022 07:45 )             36.4     29 Dec 2022 07:45    140    |  102    |  66     ----------------------------<  133    3.8     |  30     |  1.53     Ca    9.1        29 Dec 2022 07:45  Phos  4.1       28 Dec 2022 07:30  Mg     2.9       28 Dec 2022 07:30    Culture - Blood (collected 27 Dec 2022 11:45)  Source: .Blood Blood-Peripheral  Preliminary Report (28 Dec 2022 15:09):    No growth to date.    Culture - Blood (collected 27 Dec 2022 11:40)  Source: .Blood Blood-Peripheral  Preliminary Report (28 Dec 2022 15:09):    No growth to date.    albuterol    90 MICROgram(s) HFA Inhaler 2 Puff(s) Inhalation every 6 hours  allopurinol 100 milliGRAM(s) Oral daily  apixaban 2.5 milliGRAM(s) Oral every 12 hours  aspirin enteric coated 81 milliGRAM(s) Oral daily  benzonatate 200 milliGRAM(s) Oral three times a day  bisacodyl 5 milliGRAM(s) Oral every 12 hours PRN  budesonide 160 MICROgram(s)/formoterol 4.5 MICROgram(s) Inhaler 2 Puff(s) Inhalation two times a day  cefepime   IVPB      cefepime   IVPB 1000 milliGRAM(s) IV Intermittent every 12 hours  fluticasone propionate 50 MICROgram(s)/spray Nasal Spray 1 Spray(s) Both Nostrils two times a day  furosemide    Tablet 40 milliGRAM(s) Oral daily  guaiFENesin  milliGRAM(s) Oral every 12 hours  hydrALAZINE 25 milliGRAM(s) Oral every 8 hours  hydrocodone/homatropine Syrup 5 milliLiter(s) Oral at bedtime  metoprolol succinate ER 50 milliGRAM(s) Oral daily  predniSONE   Tablet 10 milliGRAM(s) Oral daily  senna 2 Tablet(s) Oral at bedtime  sodium chloride 0.65% Nasal 1 Spray(s) Both Nostrils every 6 hours  tiotropium 2.5 MICROgram(s) Inhaler 2 Puff(s) Inhalation daily    A/P:    Flu A, b/l PNA  CM, mod MR, TR, EF 45-50%  Hemodynamic IRINEO/CKD 3 vs CKD progression (Cr 1.16 - 3/10/22)  Overall stable renal fx  UA bland  Renal SONO w/o hydro  No objection Lasix as ordered  F/u [facility]     [phone]

## 2022-12-29 NOTE — PROGRESS NOTE ADULT - SUBJECTIVE AND OBJECTIVE BOX
CC: f/u for influenza and pneumonia    Patient reports: he is alert , requires O2, is mildly short of breath    REVIEW OF SYSTEMS:  All other review of systems negative (Comprehensive ROS)    Antimicrobials Day #  :day 2  cefepime   IVPB      cefepime   IVPB 1000 milliGRAM(s) IV Intermittent every 12 hours    Other Medications Reviewed  MEDICATIONS  (STANDING):  allopurinol 100 milliGRAM(s) Oral daily  apixaban 2.5 milliGRAM(s) Oral every 12 hours  aspirin enteric coated 81 milliGRAM(s) Oral daily  benzonatate 200 milliGRAM(s) Oral three times a day  budesonide 160 MICROgram(s)/formoterol 4.5 MICROgram(s) Inhaler 2 Puff(s) Inhalation two times a day  cefepime   IVPB      cefepime   IVPB 1000 milliGRAM(s) IV Intermittent every 12 hours  fluticasone propionate 50 MICROgram(s)/spray Nasal Spray 1 Spray(s) Both Nostrils two times a day  furosemide    Tablet 40 milliGRAM(s) Oral daily  guaiFENesin  milliGRAM(s) Oral every 12 hours  hydrALAZINE 25 milliGRAM(s) Oral every 8 hours  hydrocodone/homatropine Syrup 5 milliLiter(s) Oral at bedtime  metoprolol succinate ER 50 milliGRAM(s) Oral daily  predniSONE   Tablet 10 milliGRAM(s) Oral daily  senna 2 Tablet(s) Oral at bedtime  sodium chloride 0.65% Nasal 1 Spray(s) Both Nostrils every 6 hours    T(F): 97.8 (12-29-22 @ 05:25), Max: 97.8 (12-29-22 @ 05:25)  HR: 80 (12-29-22 @ 05:25)  BP: 126/76 (12-29-22 @ 05:25)  RR: 16 (12-29-22 @ 05:25)  SpO2: 98% (12-29-22 @ 05:25)  Wt(kg): --    PHYSICAL EXAM:  General: alert, mild respiratory distress  Eyes:  anicteric, no conjunctival injection, no discharge  Oropharynx: no lesions or injection 	  Neck: supple, without adenopathy  Lungs: coarse BS  Heart: irregular rate and rhythm; no murmur,  Abdomen: soft, nondistended, nontender, without mass or organomegaly  Skin: no rash  Extremities: no clubbing, cyanosis, +  edema  Neurologic: alert, oriented, moves all extremities    LAB RESULTS:                        11.9   17.87 )-----------( 296      ( 29 Dec 2022 07:45 )             36.4     12-29    140  |  102  |  66<H>  ----------------------------<  133<H>  3.8   |  30  |  1.53<H>    Ca    9.1      29 Dec 2022 07:45  Phos  4.1     12-28  Mg     2.9     12-28          MICROBIOLOGY:  RECENT CULTURES:  12-27 @ 11:45 .Blood Blood-Peripheral     No growth to date.      12-27 @ 11:40 .Blood Blood-Peripheral     No growth to date.          RADIOLOGY REVIEWED:  < from: CT Chest No Cont (12.27.22 @ 08:57) >  IMPRESSION:    Consolidations within bilateral lower lobes and patchy/nodular opacities   within all lobes likely representing multifocal pneumonia. Small left   pleural effusion.    --- End of Report -    < end of copied text >

## 2022-12-29 NOTE — PROGRESS NOTE ADULT - ASSESSMENT
88 year old male with a PMH of CVA, CHF, HTN, afib on eliquis, hip fx s/p fall and prior intubation for HF exacerbation presenting to [facility] ED for SOB and CP admitted to ICU for acute hypoxix respiratory failure, flash pulmonary edema and acute CHF exacerbation also found to be Flu A positive now downgraded to medical floors    #acute hypoxic respiratory failure  #multifocal PNA  #influenza A  - s/p BiPAP in the ICU and IV lasix  - now on 2L NC, wean off as toelrated  - continue steroid taper  - CT chest showed multifocal PNA  - continue cefepime  - s/p tamiflu treatment  - follow up strept and legionella urine antigen, follow up MRSA nasal PCR  - continue symbicort, nasal spray  - patient does not like duoneb- will change to albuterol inhaler and tiotropium  - continue chest PT   - ID following, recs appreciated  - pulm following, recs appreciated    #Acute Pulmonary Edema  #Hypertensive Urgency with Acute on Chronic Combined CHF  - s/p nitro gtt for hypertensive urgency  - Hold ARB due to renal function  - continue daily lasix 40 mg qd - follow renal fxn  - continue hydralazine 25 mg q8h, metoprolol succ 50 mg daily  - cardiac follow up to optimize meds  - monitor volume status, daily weights, I/Os, replete lytes as needed    #Elevated Troponin  #NSTEMI, likely type II  - likely demand ischemia  - cardiology appreciated  - cont medical management, cont asa, eliquis, bb    #IRINEO on CKD 3  - hold ARB.  - avoid nephrotoxins  - renally adjusted meds  - monitor BMP  - appreciate Renal consult - d/w Dr. Pineda. Cont lasix for now- May need to accept a higher creatinine.      Chronic Atrial Fibrillation  continue metoprolol for rate control, eliquis for stroke ppx    History of CVA  continue ASA    DVT PPx  eliquis adjusted to 2.5 BID for renal fxn    Full code  palliative following    patient prefers to update family on his own  ** Needs a new PT re-evaluation 88 year old male with a PMH of CVA, CHF, HTN, afib on eliquis, hip fx s/p fall and prior intubation for HF exacerbation presenting to [facility] ED for SOB and CP admitted to ICU for acute hypoxix respiratory failure, flash pulmonary edema and acute CHF exacerbation also found to be Flu A positive now downgraded to medical floors    #acute hypoxic respiratory failure  #multifocal PNA  #influenza A  - s/p BiPAP in the ICU and IV lasix  - now on 2L NC, wean off as toelrated  - continue steroid taper- now on prednisone 10mg x4 days  - CT chest showed multifocal PNA  - continue cefepime  - s/p tamiflu treatment  - follow up strept and legionella urine antigen, follow up MRSA nasal PCR  - continue symbicort, nasal spray  - patient does not like duoneb- will change to albuterol inhaler and tiotropium  - continue chest PT   - ID following, recs appreciated  - pulm following, recs appreciated    #Acute Pulmonary Edema  #Hypertensive Urgency with Acute on Chronic Combined CHF  - s/p nitro gtt for hypertensive urgency  - Hold ARB due to renal function  - continue daily lasix 40mg qd - follow renal fxn  - continue hydralazine 25 mg q8h, metoprolol succ 50 mg daily  - cardiac follow up to optimize meds  - monitor volume status, daily weights, I/Os, replete lytes as needed    #Elevated Troponin  #NSTEMI, likely type II  - likely demand ischemia  - cardiology appreciated  - cont medical management, cont asa, eliquis, bb    #IRINEO on CKD 3  - hold ARB  - avoid nephrotoxins  - renally adjusted meds  - monitor BMP  - appreciate Renal consult - d/w Dr. Pineda. Cont lasix for now- May need to accept a higher creatinine.      #Chronic Atrial Fibrillation  - continue metoprolol for rate control, eliquis for stroke ppx    #History of CVA  - continue ASA    #DVT PPx  - eliquis adjusted to 2.5 BID for renal fxn    PT- recommend [place]    patient prefers to update family on his own

## 2022-12-29 NOTE — PROGRESS NOTE ADULT - ASSESSMENT
88y male with hx of CVA, CHF, HTN, afib, hip fx s/p fall. He preseted to  ER on 12/18  with SOB and CP, elevated sbp to 200's. He was treated with NIV, lasix and nitro. He tested +Influenza A with mild elevated troponin, elevated bnp and ?IRINEO. He completed renally dosed Tamiflu and prednisone taper, currently 20 mg daily. He cont to feel SOB and with rising WBC and hence ID eval requested.   He denies any fevers or chills, has weak, unproductive cough  CT chest with multifocal infiltrates  PC is .2, legionella urine antigen negative, strep pneumo urine antigen negative,and his blood cultures are negative.  Suggest  1. Cont Cefepime to cover for hospital acquired pneumonia, day 2  2.With negative MRSA screen will hold MRSA coverage  3.With negative legionella urine antigen will hold atypical coverage  4.Diurese as per medical services  5.Steroids per pulmonary  6.NSTEMI per medicine  7.Suspect dyspnea multifactorial-CHF, post influenza,bacterial  8.Supportive care, leukocytosis likely multifactorial.

## 2022-12-30 LAB
ANION GAP SERPL CALC-SCNC: 11 MMOL/L — SIGNIFICANT CHANGE UP (ref 5–17)
BASOPHILS # BLD AUTO: 0.17 K/UL — SIGNIFICANT CHANGE UP (ref 0–0.2)
BASOPHILS NFR BLD AUTO: 1 % — SIGNIFICANT CHANGE UP (ref 0–2)
BUN SERPL-MCNC: 63 MG/DL — HIGH (ref 7–23)
CALCIUM SERPL-MCNC: 8.9 MG/DL — SIGNIFICANT CHANGE UP (ref 8.4–10.5)
CHLORIDE SERPL-SCNC: 101 MMOL/L — SIGNIFICANT CHANGE UP (ref 96–108)
CO2 SERPL-SCNC: 24 MMOL/L — SIGNIFICANT CHANGE UP (ref 22–31)
CREAT SERPL-MCNC: 1.46 MG/DL — HIGH (ref 0.5–1.3)
EGFR: 46 ML/MIN/1.73M2 — LOW
EOSINOPHIL # BLD AUTO: 0.04 K/UL — SIGNIFICANT CHANGE UP (ref 0–0.5)
EOSINOPHIL NFR BLD AUTO: 0.2 % — SIGNIFICANT CHANGE UP (ref 0–6)
GLUCOSE SERPL-MCNC: 134 MG/DL — HIGH (ref 70–99)
HCT VFR BLD CALC: 37.4 % — LOW (ref 39–50)
HGB BLD-MCNC: 12.1 G/DL — LOW (ref 13–17)
IMM GRANULOCYTES NFR BLD AUTO: 7.4 % — HIGH (ref 0–0.9)
LYMPHOCYTES # BLD AUTO: 1.94 K/UL — SIGNIFICANT CHANGE UP (ref 1–3.3)
LYMPHOCYTES # BLD AUTO: 12 % — LOW (ref 13–44)
MCHC RBC-ENTMCNC: 31.6 PG — SIGNIFICANT CHANGE UP (ref 27–34)
MCHC RBC-ENTMCNC: 32.4 GM/DL — SIGNIFICANT CHANGE UP (ref 32–36)
MCV RBC AUTO: 97.7 FL — SIGNIFICANT CHANGE UP (ref 80–100)
MONOCYTES # BLD AUTO: 1.58 K/UL — HIGH (ref 0–0.9)
MONOCYTES NFR BLD AUTO: 9.7 % — SIGNIFICANT CHANGE UP (ref 2–14)
NEUTROPHILS # BLD AUTO: 11.28 K/UL — HIGH (ref 1.8–7.4)
NEUTROPHILS NFR BLD AUTO: 69.7 % — SIGNIFICANT CHANGE UP (ref 43–77)
NRBC # BLD: 0 /100 WBCS — SIGNIFICANT CHANGE UP (ref 0–0)
PLATELET # BLD AUTO: 276 K/UL — SIGNIFICANT CHANGE UP (ref 150–400)
POTASSIUM SERPL-MCNC: 4.2 MMOL/L — SIGNIFICANT CHANGE UP (ref 3.5–5.3)
POTASSIUM SERPL-SCNC: 4.2 MMOL/L — SIGNIFICANT CHANGE UP (ref 3.5–5.3)
RBC # BLD: 3.83 M/UL — LOW (ref 4.2–5.8)
RBC # FLD: 13.7 % — SIGNIFICANT CHANGE UP (ref 10.3–14.5)
SODIUM SERPL-SCNC: 136 MMOL/L — SIGNIFICANT CHANGE UP (ref 135–145)
WBC # BLD: 16.21 K/UL — HIGH (ref 3.8–10.5)
WBC # FLD AUTO: 16.21 K/UL — HIGH (ref 3.8–10.5)

## 2022-12-30 PROCEDURE — 99233 SBSQ HOSP IP/OBS HIGH 50: CPT

## 2022-12-30 RX ORDER — RIVAROXABAN 15 MG-20MG
1 KIT ORAL
Qty: 30 | Refills: 0
Start: 2022-12-30 | End: 2023-01-28

## 2022-12-30 RX ADMIN — CEFEPIME 100 MILLIGRAM(S): 1 INJECTION, POWDER, FOR SOLUTION INTRAMUSCULAR; INTRAVENOUS at 17:28

## 2022-12-30 RX ADMIN — Medication 200 MILLIGRAM(S): at 21:13

## 2022-12-30 RX ADMIN — Medication 600 MILLIGRAM(S): at 17:27

## 2022-12-30 RX ADMIN — Medication 1 SPRAY(S): at 17:28

## 2022-12-30 RX ADMIN — Medication 25 MILLIGRAM(S): at 05:40

## 2022-12-30 RX ADMIN — Medication 200 MILLIGRAM(S): at 14:09

## 2022-12-30 RX ADMIN — CEFEPIME 100 MILLIGRAM(S): 1 INJECTION, POWDER, FOR SOLUTION INTRAMUSCULAR; INTRAVENOUS at 05:39

## 2022-12-30 RX ADMIN — APIXABAN 2.5 MILLIGRAM(S): 2.5 TABLET, FILM COATED ORAL at 05:41

## 2022-12-30 RX ADMIN — Medication 1 SPRAY(S): at 00:05

## 2022-12-30 RX ADMIN — Medication 600 MILLIGRAM(S): at 05:41

## 2022-12-30 RX ADMIN — Medication 1 SPRAY(S): at 05:39

## 2022-12-30 RX ADMIN — Medication 1 SPRAY(S): at 12:23

## 2022-12-30 RX ADMIN — Medication 40 MILLIGRAM(S): at 05:41

## 2022-12-30 RX ADMIN — SENNA PLUS 2 TABLET(S): 8.6 TABLET ORAL at 21:13

## 2022-12-30 RX ADMIN — BUDESONIDE AND FORMOTEROL FUMARATE DIHYDRATE 2 PUFF(S): 160; 4.5 AEROSOL RESPIRATORY (INHALATION) at 09:47

## 2022-12-30 RX ADMIN — Medication 25 MILLIGRAM(S): at 21:13

## 2022-12-30 RX ADMIN — TIOTROPIUM BROMIDE 2 PUFF(S): 18 CAPSULE ORAL; RESPIRATORY (INHALATION) at 09:48

## 2022-12-30 RX ADMIN — Medication 10 MILLIGRAM(S): at 05:41

## 2022-12-30 RX ADMIN — Medication 25 MILLIGRAM(S): at 14:09

## 2022-12-30 RX ADMIN — Medication 50 MILLIGRAM(S): at 05:40

## 2022-12-30 RX ADMIN — ALBUTEROL 2 PUFF(S): 90 AEROSOL, METERED ORAL at 09:46

## 2022-12-30 RX ADMIN — Medication 81 MILLIGRAM(S): at 12:19

## 2022-12-30 RX ADMIN — Medication 100 MILLIGRAM(S): at 12:19

## 2022-12-30 RX ADMIN — Medication 200 MILLIGRAM(S): at 05:40

## 2022-12-30 RX ADMIN — Medication 1 SPRAY(S): at 17:27

## 2022-12-30 RX ADMIN — APIXABAN 2.5 MILLIGRAM(S): 2.5 TABLET, FILM COATED ORAL at 17:27

## 2022-12-30 NOTE — PROGRESS NOTE ADULT - SUBJECTIVE AND OBJECTIVE BOX
Patient is a 88y old  Male who presents with a chief complaint of shortness of breath (29 Dec 2022 16:04)      Patient seen and examined at bedside. No events overnight. Patient was in bed yesterday for chest PT then went back to bed. Currently on 2-3L NC. Patient continues to have productive cough but states it is better, feels lousy but better than admission. Admits to SOB but improving, denies chest pain, abd pain.    ALLERGIES:  No Known Allergies    MEDICATIONS  (STANDING):  albuterol    90 MICROgram(s) HFA Inhaler 2 Puff(s) Inhalation every 6 hours  allopurinol 100 milliGRAM(s) Oral daily  apixaban 2.5 milliGRAM(s) Oral every 12 hours  aspirin enteric coated 81 milliGRAM(s) Oral daily  benzonatate 200 milliGRAM(s) Oral three times a day  budesonide 160 MICROgram(s)/formoterol 4.5 MICROgram(s) Inhaler 2 Puff(s) Inhalation two times a day  cefepime   IVPB 1000 milliGRAM(s) IV Intermittent every 12 hours  cefepime   IVPB      fluticasone propionate 50 MICROgram(s)/spray Nasal Spray 1 Spray(s) Both Nostrils two times a day  furosemide    Tablet 40 milliGRAM(s) Oral daily  guaiFENesin  milliGRAM(s) Oral every 12 hours  hydrALAZINE 25 milliGRAM(s) Oral every 8 hours  hydrocodone/homatropine Syrup 5 milliLiter(s) Oral at bedtime  metoprolol succinate ER 50 milliGRAM(s) Oral daily  predniSONE   Tablet 10 milliGRAM(s) Oral daily  senna 2 Tablet(s) Oral at bedtime  sodium chloride 0.65% Nasal 1 Spray(s) Both Nostrils every 6 hours  tiotropium 2.5 MICROgram(s) Inhaler 2 Puff(s) Inhalation daily    MEDICATIONS  (PRN):  bisacodyl 5 milliGRAM(s) Oral every 12 hours PRN Constipation    Vital Signs Last 24 Hrs  T(F): 98.7 (30 Dec 2022 06:00), Max: 98.7 (30 Dec 2022 06:00)  HR: 82 (30 Dec 2022 06:00) (60 - 85)  BP: 143/66 (30 Dec 2022 06:00) (123/66 - 149/66)  RR: 18 (30 Dec 2022 06:00) (16 - 18)  SpO2: 96% (30 Dec 2022 06:00) (96% - 98%)  I&O's Summary    29 Dec 2022 07:01  -  30 Dec 2022 07:00  --------------------------------------------------------  IN: 470 mL / OUT: 750 mL / NET: -280 mL    30 Dec 2022 07:01  -  30 Dec 2022 11:56  --------------------------------------------------------  IN: 400 mL / OUT: 300 mL / NET: 100 mL      PHYSICAL EXAM:  GENERAL: NAD, sitting in chair  HEAD:  Atraumatic, Normocephalic  EYES: conjunctiva and sclera clear  ENMT: Moist mucous membranes, Good dentition, no thrush  CHEST/LUNG: diminished throughout, wheezing auscultated with rhonchi throughout   HEART: RRR; S1/S2, No murmur  ABDOMEN: Soft, Nontender, Nondistended; Bowel sounds present  EXTREMITIES: No calf tenderness, No cyanosis, 1+ pitting edema b/l LE  SKIN: Warm, perfused  PSYCH: Normal mood, Normal affect    LABS:                        12.1   16.21 )-----------( 276      ( 30 Dec 2022 06:10 )             37.4     12-30    136  |  101  |  63  ----------------------------<  134  4.2   |  24  |  1.46    Ca    8.9      30 Dec 2022 06:10  Phos  4.1     12-28  Mg     2.9     12-28                                      Culture - Blood (collected 27 Dec 2022 11:45)  Source: .Blood Blood-Peripheral  Preliminary Report (28 Dec 2022 15:09):    No growth to date.    Culture - Blood (collected 27 Dec 2022 11:40)  Source: .Blood Blood-Peripheral  Preliminary Report (28 Dec 2022 15:09):    No growth to date.          RADIOLOGY & ADDITIONAL TESTS:    Care Discussed with Consultants/Other Providers:

## 2022-12-30 NOTE — PROGRESS NOTE ADULT - ASSESSMENT
Physical Examination:  GENERAL:               Alert,  no acute distress.    HEENT:                    + JVD, Moist MM  PULM:                     Bilateral air entry, Clear to auscultation bilaterally, no significant sputum production, no Rales, No Rhonchi, trace Wheezing  CVS:                         S1, S2,  +Murmur  ABD:                        Soft, nondistended, nontender, normoactive bowel sounds,   EXT:                         mild edema, nontender, No Cyanosis or Clubbing   NEURO:                  Alert, oriented, interactive, nonfocal, follows commands  PSYC:                      Calm, + Insight.        88 year old male with a PMH of CVA, CHF, HTN, afib on eliquis, hip fx s/p fall and prior intubation for HF exacerbation presenting to [facility] ED for SOB and CP. Pt noted to be hypoxemic with elevated sbp to 200's. Pt tx with NIV, lasix and nitro with +response. ICU team consulted. Influenza A + with mild elevated troponin, elevated bnp and ?IRINEO on labs. When we arrived pt was alert and oriented, with O2 sat 100%, rate controlled afib on monitor and sbp 140. Pt trial off bipap with NC ~4 liters without desaturation or respiratory distress. ABG obtained with the following results: 7.37 /  45 / 87 / 26 97% on 4 liters NC. Troponin mildly elevated and has stabilized with no major change and ekg is without acute ischemia noted currently appearing to be demand ischemia at this time (also he is on AC at home), cardiology contacted by ED MD. BNP elevated to 6k. Given pt appears comfortable and states that he is "feeling better" along with his neurological, respiratory (off NIV) and hemodynamic stability  he is not currently a candidate for ICU and is appropriate for monitoring on telemetry under medicine service. However, if any changes should occur please contact our team immediately. Thank you. Recommending the following:    Assessment  1. Influenza A now with multifocal pna  2. 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 Elevated trop, r/o NSTEMI  4. Underlying Afib on Eliquis, HTN, CVA,   5. Increasing Cr / IRINEO normal baseline    Plan   Taper n/c o2 as tolerated  Finish course of antibiotics as per ID  Continue symbicort  repeat CXR in am    Oral diet  encouraged incentive spirometry   prednisone taper to off as ordered  continue  duoneb atc  ASA, A/c with Eliquis     case d/w Dr. case    Goals of Care: Full code

## 2022-12-30 NOTE — PROGRESS NOTE ADULT - SUBJECTIVE AND OBJECTIVE BOX
No distress    Vital Signs Last 24 Hrs  T(C): 36.4 (12-30-22 @ 16:00), Max: 37.1 (12-30-22 @ 06:00)  T(F): 97.6 (12-30-22 @ 16:00), Max: 98.7 (12-30-22 @ 06:00)  HR: 70 (12-30-22 @ 16:00) (60 - 82)  BP: 129/71 (12-30-22 @ 16:00) (123/66 - 145/73)  RR: 16 (12-30-22 @ 16:00) (16 - 18)  SpO2: 96% (12-30-22 @ 16:00) (96% - 96%)    I&O's Detail    29 Dec 2022 07:01  -  30 Dec 2022 07:00  --------------------------------------------------------  IN:    IV PiggyBack: 50 mL    Oral Fluid: 420 mL  Total IN: 470 mL    OUT:    Voided (mL): 750 mL  Total OUT: 750 mL    Total NET: -280 mL    30 Dec 2022 07:01  -  30 Dec 2022 18:13  --------------------------------------------------------  IN:    Oral Fluid: 700 mL  Total IN: 700 mL    OUT:    Voided (mL): 900 mL  Total OUT: 900 mL    Total NET: -200 mL    s1s2  b/l air entry  soft, ND  + edema, stasis changes                                                12.1   16.21 )-----------( 276      ( 30 Dec 2022 06:10 )             37.4     30 Dec 2022 06:10    136    |  101    |  63     ----------------------------<  134    4.2     |  24     |  1.46     Ca    8.9        30 Dec 2022 06:10    albuterol    90 MICROgram(s) HFA Inhaler 2 Puff(s) Inhalation every 6 hours  allopurinol 100 milliGRAM(s) Oral daily  apixaban 2.5 milliGRAM(s) Oral every 12 hours  aspirin enteric coated 81 milliGRAM(s) Oral daily  benzonatate 200 milliGRAM(s) Oral three times a day  bisacodyl 5 milliGRAM(s) Oral every 12 hours PRN  budesonide 160 MICROgram(s)/formoterol 4.5 MICROgram(s) Inhaler 2 Puff(s) Inhalation two times a day  cefepime   IVPB 1000 milliGRAM(s) IV Intermittent every 12 hours  cefepime   IVPB      fluticasone propionate 50 MICROgram(s)/spray Nasal Spray 1 Spray(s) Both Nostrils two times a day  furosemide    Tablet 40 milliGRAM(s) Oral daily  guaiFENesin  milliGRAM(s) Oral every 12 hours  hydrALAZINE 25 milliGRAM(s) Oral every 8 hours  hydrocodone/homatropine Syrup 5 milliLiter(s) Oral at bedtime  metoprolol succinate ER 50 milliGRAM(s) Oral daily  predniSONE   Tablet 10 milliGRAM(s) Oral daily  senna 2 Tablet(s) Oral at bedtime  sodium chloride 0.65% Nasal 1 Spray(s) Both Nostrils every 6 hours  tiotropium 2.5 MICROgram(s) Inhaler 2 Puff(s) Inhalation daily    A/P:    Flu A, b/l PNA  CM, mod MR, TR, EF 45-50%  Hemodynamic IRINEO/CKD 3 vs CKD progression (Cr 1.16 - 3/10/22)  Overall stable renal fx  UA bland  Renal SONO w/o hydro  Continue Lasix  F/u [facility]     [phone]

## 2022-12-30 NOTE — PROGRESS NOTE ADULT - ASSESSMENT
88y male with hx of CVA, CHF, HTN, afib, hip fx s/p fall. He preseted to  ER on 12/18  with SOB and CP, elevated sbp to 200's. He was treated with NIV, lasix and nitro. He tested +Influenza A with mild elevated troponin, elevated bnp and ?IRINEO. He completed renally dosed Tamiflu and prednisone taper, currently 20 mg daily. He cont to feel SOB and with rising WBC and hence ID eval requested.   He denies any fevers or chills, has weak, unproductive cough  CT chest with multifocal infiltrates  PC is .2, legionella urine antigen negative, strep pneumo urine antigen negative,and his blood cultures are negative.  MRSA --negative  Suggest  1. Cont Cefepime to cover for hospital acquired pneumonia, day 3  2.Diurese as per medical services .Steroids per pulmonary  3.NSTEMI per cardiology  4.Suspect dyspnea multifactorial-CHF, post influenza,bacterial  5. Monitor WBC trend--slowly moderating

## 2022-12-30 NOTE — PROGRESS NOTE ADULT - SUBJECTIVE AND OBJECTIVE BOX
CC: f/u for influenza and pneumonia    Patient resting comfortably in bed, he has no new c/o    REVIEW OF SYSTEMS:  All other review of systems negative (Comprehensive ROS)    Antimicrobials Day #  :day 3  cefepime   IVPB 1000 milliGRAM(s) IV Intermittent every 12 hours  cefepime   IVPB          Other Medications Reviewed  MEDICATIONS  (STANDING):  allopurinol 100 milliGRAM(s) Oral daily  apixaban 2.5 milliGRAM(s) Oral every 12 hours  aspirin enteric coated 81 milliGRAM(s) Oral daily  benzonatate 200 milliGRAM(s) Oral three times a day  budesonide 160 MICROgram(s)/formoterol 4.5 MICROgram(s) Inhaler 2 Puff(s) Inhalation two times a day  cefepime   IVPB      cefepime   IVPB 1000 milliGRAM(s) IV Intermittent every 12 hours  fluticasone propionate 50 MICROgram(s)/spray Nasal Spray 1 Spray(s) Both Nostrils two times a day  furosemide    Tablet 40 milliGRAM(s) Oral daily  guaiFENesin  milliGRAM(s) Oral every 12 hours  hydrALAZINE 25 milliGRAM(s) Oral every 8 hours  hydrocodone/homatropine Syrup 5 milliLiter(s) Oral at bedtime  metoprolol succinate ER 50 milliGRAM(s) Oral daily  predniSONE   Tablet 10 milliGRAM(s) Oral daily  senna 2 Tablet(s) Oral at bedtime  sodium chloride 0.65% Nasal 1 Spray(s) Both Nostrils every 6 hours    T(F): 97.8 (12-29-22 @ 05:25), Max: 97.8 (12-29-22 @ 05:25)  HR: 80 (12-29-22 @ 05:25)  BP: 126/76 (12-29-22 @ 05:25)  RR: 16 (12-29-22 @ 05:25)  SpO2: 98% (12-29-22 @ 05:25)  Wt(kg): --    PHYSICAL EXAM:  General: alert, mild respiratory distress  Eyes:  anicteric, no conjunctival injection, no discharge  Oropharynx: no lesions or injection 	  Neck: supple, without adenopathy  Lungs: coarse BS  Heart: irregular rate and rhythm; no murmur,  Abdomen: soft, nondistended, nontender, without mass or organomegaly  Skin: no rash  Extremities: no clubbing, cyanosis, +  edema  Neurologic: alert, oriented, moves all extremities    LAB RESULTS:                                   12.1   16.21 )-----------( 276      ( 30 Dec 2022 06:10 )             37.4   12-30    136  |  101  |  63<H>  ----------------------------<  134<H>  4.2   |  24  |  1.46<H>    Ca    8.9      30 Dec 2022 06:10              MICROBIOLOGY:  RECENT CULTURES:  12-27 @ 11:45 .Blood Blood-Peripheral     No growth to date.      12-27 @ 11:40 .Blood Blood-Peripheral     No growth to date.          RADIOLOGY REVIEWED:  < from: CT Chest No Cont (12.27.22 @ 08:57) >  IMPRESSION:    Consolidations within bilateral lower lobes and patchy/nodular opacities   within all lobes likely representing multifocal pneumonia. Small left   pleural effusion.    --- End of Report -    < end of copied text >

## 2022-12-30 NOTE — PROGRESS NOTE ADULT - SUBJECTIVE AND OBJECTIVE BOX
Follow-up Pulmonary Progress Note  Chief Complaint : Heart failure    patient feeling better  but remains on 3 l n/c  sat 93%  cough and sob better today    Allergies :No Known Allergies      PAST MEDICAL & SURGICAL HISTORY:  Afib    Congestive heart failure (CHF)    CVA (cerebral vascular accident)    Hypertension, unspecified type    No significant past surgical history        Medications:  MEDICATIONS  (STANDING):  albuterol    90 MICROgram(s) HFA Inhaler 2 Puff(s) Inhalation every 6 hours  allopurinol 100 milliGRAM(s) Oral daily  apixaban 2.5 milliGRAM(s) Oral every 12 hours  aspirin enteric coated 81 milliGRAM(s) Oral daily  benzonatate 200 milliGRAM(s) Oral three times a day  budesonide 160 MICROgram(s)/formoterol 4.5 MICROgram(s) Inhaler 2 Puff(s) Inhalation two times a day  cefepime   IVPB      cefepime   IVPB 1000 milliGRAM(s) IV Intermittent every 12 hours  fluticasone propionate 50 MICROgram(s)/spray Nasal Spray 1 Spray(s) Both Nostrils two times a day  furosemide    Tablet 40 milliGRAM(s) Oral daily  guaiFENesin  milliGRAM(s) Oral every 12 hours  hydrALAZINE 25 milliGRAM(s) Oral every 8 hours  hydrocodone/homatropine Syrup 5 milliLiter(s) Oral at bedtime  metoprolol succinate ER 50 milliGRAM(s) Oral daily  predniSONE   Tablet 10 milliGRAM(s) Oral daily  senna 2 Tablet(s) Oral at bedtime  sodium chloride 0.65% Nasal 1 Spray(s) Both Nostrils every 6 hours  tiotropium 2.5 MICROgram(s) Inhaler 2 Puff(s) Inhalation daily    MEDICATIONS  (PRN):  bisacodyl 5 milliGRAM(s) Oral every 12 hours PRN Constipation      Antibiotics History  cefepime   IVPB 1000 milliGRAM(s) IV Intermittent once, 12-27-22 @ 14:00  cefepime   IVPB 1000 milliGRAM(s) IV Intermittent every 12 hours, 12-28-22 @ 06:00  cefepime   IVPB    , 12-27-22 @ 15:37  cefTRIAXone   IVPB 1000 milliGRAM(s) IV Intermittent every 24 hours, 12-28-22 @ 07:30, Stop order after: 7 Days  cefTRIAXone   IVPB    , 12-27-22 @ 07:30  cefTRIAXone   IVPB 1000 milliGRAM(s) IV Intermittent once, 12-27-22 @ 07:30, Stop order after: 1 Doses  oseltamivir 75 milliGRAM(s) Oral once, 12-18-22 @ 20:55, Stop order after: 1 Doses  oseltamivir 30 milliGRAM(s) Oral every 12 hours, 12-19-22 @ 09:00, Stop order after: 4 Days  oseltamivir 30 milliGRAM(s) Oral daily, 12-23-22 @ 00:00, Stop order after: 1 Doses      Heme Medications   apixaban 2.5 milliGRAM(s) Oral every 12 hours, 12-22-22 @ 13:59  aspirin enteric coated 81 milliGRAM(s) Oral daily, 12-17-22 @ 11:33      GI Medications  bisacodyl 5 milliGRAM(s) Oral every 12 hours, 12-29-22 @ 14:59, Routine PRN  senna 2 Tablet(s) Oral at bedtime, 12-17-22 @ 11:34, Routine        LABS:                        12.1   16.21 )-----------( 276      ( 30 Dec 2022 06:10 )             37.4     12-30    136  |  101  |  63<H>  ----------------------------<  134<H>  4.2   |  24  |  1.46<H>    Ca    8.9      30 Dec 2022 06:10       CULTURES: (if applicable)    Culture - Blood (collected 12-27-22 @ 11:45)  Source: .Blood Blood-Peripheral  Preliminary Report (12-28-22 @ 15:09):    No growth to date.    Culture - Blood (collected 12-27-22 @ 11:40)  Source: .Blood Blood-Peripheral  Preliminary Report (12-28-22 @ 15:09):    No growth to date.       < from: CT Chest No Cont (12.27.22 @ 08:57) >    ACC: [id number] EXAM:  CT CHEST                          PROCEDURE DATE:  12/27/2022         FINDINGS:    AIRWAYS, LUNGS and PLEURA: Patent central airways. Consolidations within bilateral lower lobes and patchy/nodular opacities within all lobes  likely representing multifocal pneumonia. Small left pleural effusion.  LYMPH NODES, MEDIASTINUM AND YANIQUE: Stable enlarged mediastinal lymph nodes measuring up to 3.5 x 2.5 cm within subcarinal region.    HEART AND VESSELS: Cardiomegaly. No pericardial effusion. Stable calcified densities along the right lateral margin of the aortic root. Thoracic aorta normal in diameter. Dilated pulmonary arteries. Coronary calcifications.    VISUALIZED UPPER ABDOMEN: Small calcified nodules within the liver parenchyma. Cholelithiasis. Bilateralrenal cysts.    CHEST WALL AND BONES: No aggressive osseous lesion. Advanced osteoarthritis of the glenohumeral joints, left greater than the right. Old fracture deformity of bilateral ribs. Osteoporosis.    LOWER NECK: Within normal limits.    IMPRESSION:    Consolidations within bilateral lower lobes and patchy/nodular opacities within all lobes likely representing multifocal pneumonia. Small left pleural effusion.    --- End of Report ---      < end of copied text >      VITALS:  T(C): 37.1 (12-30-22 @ 06:00), Max: 37.1 (12-30-22 @ 06:00)  T(F): 98.7 (12-30-22 @ 06:00), Max: 98.7 (12-30-22 @ 06:00)  HR: 82 (12-30-22 @ 06:00) (60 - 85)  BP: 143/66 (12-30-22 @ 06:00) (123/66 - 149/66)  BP(mean): --  ABP: --  ABP(mean): --  RR: 18 (12-30-22 @ 06:00) (16 - 18)  SpO2: 96% (12-30-22 @ 06:00) (96% - 98%)  CVP(mm Hg): --  CVP(cm H2O): --    Ins and Outs     12-29-22 @ 07:01  -  12-30-22 @ 07:00  --------------------------------------------------------  IN: 470 mL / OUT: 750 mL / NET: -280 mL    12-30-22 @ 07:01  -  12-30-22 @ 14:13  --------------------------------------------------------  IN: 700 mL / OUT: 900 mL / NET: -200 mL                I&O's Detail    29 Dec 2022 07:01  -  30 Dec 2022 07:00  --------------------------------------------------------  IN:    IV PiggyBack: 50 mL    Oral Fluid: 420 mL  Total IN: 470 mL    OUT:    Voided (mL): 750 mL  Total OUT: 750 mL    Total NET: -280 mL      30 Dec 2022 07:01  -  30 Dec 2022 14:13  --------------------------------------------------------  IN:    Oral Fluid: 700 mL  Total IN: 700 mL    OUT:    Voided (mL): 900 mL  Total OUT: 900 mL    Total NET: -200 mL

## 2022-12-30 NOTE — PROGRESS NOTE ADULT - ASSESSMENT
88 year old male with a PMH of CVA, CHF, HTN, afib on eliquis, hip fx s/p fall and prior intubation for HF exacerbation presenting to [facility] ED for SOB and CP admitted to ICU for acute hypoxix respiratory failure, flash pulmonary edema and acute CHF exacerbation also found to be Flu A positive now downgraded to medical floors    #acute hypoxic respiratory failure  #multifocal PNA  #influenza A  - s/p BiPAP in the ICU and IV lasix  - now on 3L NC, wean off as toelrated  - continue steroid taper- now on prednisone 10mg x4 days  - CT chest showed multifocal PNA  - continue cefepime- course as per ID, day 3  - s/p tamiflu treatment  - follow up strept and legionella urine antigen, follow up MRSA nasal PCR  - continue symbicort, nasal spray  - patient does not like duoneb- will change to albuterol inhaler and tiotropium  - continue chest PT   - ID following, recs appreciated  - pulm following, recs appreciated    #Acute Pulmonary Edema  #Hypertensive Urgency with Acute on Chronic Combined CHF  - s/p nitro gtt for hypertensive urgency  - Hold ARB due to renal function  - continue daily lasix 40mg qd - follow renal fxn  - continue hydralazine 25 mg q8h, metoprolol succ 50 mg daily  - cardiac follow up to optimize meds  - monitor volume status, daily weights, I/Os, replete lytes as needed    #Elevated Troponin  #NSTEMI, likely type II  - likely demand ischemia  - cardiology appreciated  - cont medical management, cont asa, eliquis, bb    #IRINEO on CKD 3  - hold ARB  - avoid nephrotoxins  - renally adjusted meds  - monitor BMP  - appreciate Renal consult - d/w Dr. Pineda. Cont lasix for now- May need to accept a higher creatinine.      #Chronic Atrial Fibrillation  - continue metoprolol for rate control, eliquis for stroke ppx    #History of CVA  - continue ASA    #DVT PPx  - eliquis adjusted to 2.5 BID for renal fxn    PT- recommend [place]    patient prefers to update family on his own

## 2022-12-31 LAB
ALBUMIN SERPL ELPH-MCNC: 2.1 G/DL — LOW (ref 3.3–5)
ALP SERPL-CCNC: 48 U/L — SIGNIFICANT CHANGE UP (ref 40–120)
ALT FLD-CCNC: 23 U/L — SIGNIFICANT CHANGE UP (ref 10–45)
ANION GAP SERPL CALC-SCNC: 9 MMOL/L — SIGNIFICANT CHANGE UP (ref 5–17)
AST SERPL-CCNC: 24 U/L — SIGNIFICANT CHANGE UP (ref 10–40)
BASOPHILS # BLD AUTO: 0.06 K/UL — SIGNIFICANT CHANGE UP (ref 0–0.2)
BASOPHILS NFR BLD AUTO: 0.5 % — SIGNIFICANT CHANGE UP (ref 0–2)
BILIRUB SERPL-MCNC: 0.4 MG/DL — SIGNIFICANT CHANGE UP (ref 0.2–1.2)
BUN SERPL-MCNC: 60 MG/DL — HIGH (ref 7–23)
CALCIUM SERPL-MCNC: 8.7 MG/DL — SIGNIFICANT CHANGE UP (ref 8.4–10.5)
CHLORIDE SERPL-SCNC: 102 MMOL/L — SIGNIFICANT CHANGE UP (ref 96–108)
CO2 SERPL-SCNC: 28 MMOL/L — SIGNIFICANT CHANGE UP (ref 22–31)
CREAT SERPL-MCNC: 1.33 MG/DL — HIGH (ref 0.5–1.3)
EGFR: 51 ML/MIN/1.73M2 — LOW
EOSINOPHIL # BLD AUTO: 0.07 K/UL — SIGNIFICANT CHANGE UP (ref 0–0.5)
EOSINOPHIL NFR BLD AUTO: 0.5 % — SIGNIFICANT CHANGE UP (ref 0–6)
GLUCOSE SERPL-MCNC: 100 MG/DL — HIGH (ref 70–99)
HCT VFR BLD CALC: 33.5 % — LOW (ref 39–50)
HGB BLD-MCNC: 10.8 G/DL — LOW (ref 13–17)
IMM GRANULOCYTES NFR BLD AUTO: 7.7 % — HIGH (ref 0–0.9)
LYMPHOCYTES # BLD AUTO: 1.83 K/UL — SIGNIFICANT CHANGE UP (ref 1–3.3)
LYMPHOCYTES # BLD AUTO: 14.2 % — SIGNIFICANT CHANGE UP (ref 13–44)
MCHC RBC-ENTMCNC: 31.6 PG — SIGNIFICANT CHANGE UP (ref 27–34)
MCHC RBC-ENTMCNC: 32.2 GM/DL — SIGNIFICANT CHANGE UP (ref 32–36)
MCV RBC AUTO: 98 FL — SIGNIFICANT CHANGE UP (ref 80–100)
MONOCYTES # BLD AUTO: 1.57 K/UL — HIGH (ref 0–0.9)
MONOCYTES NFR BLD AUTO: 12.2 % — SIGNIFICANT CHANGE UP (ref 2–14)
NEUTROPHILS # BLD AUTO: 8.36 K/UL — HIGH (ref 1.8–7.4)
NEUTROPHILS NFR BLD AUTO: 64.9 % — SIGNIFICANT CHANGE UP (ref 43–77)
NRBC # BLD: 0 /100 WBCS — SIGNIFICANT CHANGE UP (ref 0–0)
PLATELET # BLD AUTO: 257 K/UL — SIGNIFICANT CHANGE UP (ref 150–400)
POTASSIUM SERPL-MCNC: 3.9 MMOL/L — SIGNIFICANT CHANGE UP (ref 3.5–5.3)
POTASSIUM SERPL-SCNC: 3.9 MMOL/L — SIGNIFICANT CHANGE UP (ref 3.5–5.3)
PROT SERPL-MCNC: 6.3 G/DL — SIGNIFICANT CHANGE UP (ref 6–8.3)
RBC # BLD: 3.42 M/UL — LOW (ref 4.2–5.8)
RBC # FLD: 13.4 % — SIGNIFICANT CHANGE UP (ref 10.3–14.5)
SODIUM SERPL-SCNC: 139 MMOL/L — SIGNIFICANT CHANGE UP (ref 135–145)
WBC # BLD: 12.88 K/UL — HIGH (ref 3.8–10.5)
WBC # FLD AUTO: 12.88 K/UL — HIGH (ref 3.8–10.5)

## 2022-12-31 PROCEDURE — 71045 X-RAY EXAM CHEST 1 VIEW: CPT | Mod: 26

## 2022-12-31 PROCEDURE — 99233 SBSQ HOSP IP/OBS HIGH 50: CPT

## 2022-12-31 RX ORDER — MEN-PHOR .5; .5 G/G; G/G
1 LOTION TOPICAL
Refills: 0 | Status: DISCONTINUED | OUTPATIENT
Start: 2022-12-31 | End: 2022-12-31

## 2022-12-31 RX ADMIN — Medication 25 MILLIGRAM(S): at 21:24

## 2022-12-31 RX ADMIN — Medication 1 SPRAY(S): at 17:44

## 2022-12-31 RX ADMIN — TIOTROPIUM BROMIDE 2 PUFF(S): 18 CAPSULE ORAL; RESPIRATORY (INHALATION) at 09:00

## 2022-12-31 RX ADMIN — Medication 200 MILLIGRAM(S): at 21:24

## 2022-12-31 RX ADMIN — ALBUTEROL 2 PUFF(S): 90 AEROSOL, METERED ORAL at 04:47

## 2022-12-31 RX ADMIN — APIXABAN 2.5 MILLIGRAM(S): 2.5 TABLET, FILM COATED ORAL at 17:44

## 2022-12-31 RX ADMIN — Medication 100 MILLIGRAM(S): at 11:07

## 2022-12-31 RX ADMIN — Medication 200 MILLIGRAM(S): at 06:16

## 2022-12-31 RX ADMIN — Medication 1 SPRAY(S): at 21:31

## 2022-12-31 RX ADMIN — Medication 25 MILLIGRAM(S): at 06:16

## 2022-12-31 RX ADMIN — Medication 40 MILLIGRAM(S): at 06:16

## 2022-12-31 RX ADMIN — Medication 600 MILLIGRAM(S): at 06:16

## 2022-12-31 RX ADMIN — ALBUTEROL 2 PUFF(S): 90 AEROSOL, METERED ORAL at 15:19

## 2022-12-31 RX ADMIN — Medication 10 MILLIGRAM(S): at 06:16

## 2022-12-31 RX ADMIN — Medication 25 MILLIGRAM(S): at 13:09

## 2022-12-31 RX ADMIN — BUDESONIDE AND FORMOTEROL FUMARATE DIHYDRATE 2 PUFF(S): 160; 4.5 AEROSOL RESPIRATORY (INHALATION) at 09:01

## 2022-12-31 RX ADMIN — Medication 200 MILLIGRAM(S): at 13:09

## 2022-12-31 RX ADMIN — Medication 81 MILLIGRAM(S): at 11:07

## 2022-12-31 RX ADMIN — ALBUTEROL 2 PUFF(S): 90 AEROSOL, METERED ORAL at 09:01

## 2022-12-31 RX ADMIN — Medication 1 SPRAY(S): at 06:17

## 2022-12-31 RX ADMIN — APIXABAN 2.5 MILLIGRAM(S): 2.5 TABLET, FILM COATED ORAL at 06:16

## 2022-12-31 RX ADMIN — Medication 1 SPRAY(S): at 11:07

## 2022-12-31 RX ADMIN — CEFEPIME 100 MILLIGRAM(S): 1 INJECTION, POWDER, FOR SOLUTION INTRAMUSCULAR; INTRAVENOUS at 06:16

## 2022-12-31 RX ADMIN — CEFEPIME 100 MILLIGRAM(S): 1 INJECTION, POWDER, FOR SOLUTION INTRAMUSCULAR; INTRAVENOUS at 17:44

## 2022-12-31 RX ADMIN — Medication 50 MILLIGRAM(S): at 06:16

## 2022-12-31 RX ADMIN — Medication 600 MILLIGRAM(S): at 17:44

## 2022-12-31 NOTE — PROGRESS NOTE ADULT - SUBJECTIVE AND OBJECTIVE BOX
Patient is a 88y old  Male who presents with a chief complaint of shortness of breath (31 Dec 2022 11:31)      Patient seen and examined at bedside. No events overnight. Patient was in bed yesterday for chest PT then went back to bed. Currently on 2L NC. Patient continues to have productive cough but states it is better, feels lousy but better than admission. Admits to SOB but improving, denies chest pain, abd pain.    ALLERGIES:  No Known Allergies    MEDICATIONS  (STANDING):  albuterol    90 MICROgram(s) HFA Inhaler 2 Puff(s) Inhalation every 6 hours  allopurinol 100 milliGRAM(s) Oral daily  apixaban 2.5 milliGRAM(s) Oral every 12 hours  aspirin enteric coated 81 milliGRAM(s) Oral daily  benzonatate 200 milliGRAM(s) Oral three times a day  budesonide 160 MICROgram(s)/formoterol 4.5 MICROgram(s) Inhaler 2 Puff(s) Inhalation two times a day  cefepime   IVPB      cefepime   IVPB 1000 milliGRAM(s) IV Intermittent every 12 hours  fluticasone propionate 50 MICROgram(s)/spray Nasal Spray 1 Spray(s) Both Nostrils two times a day  furosemide    Tablet 40 milliGRAM(s) Oral daily  guaiFENesin  milliGRAM(s) Oral every 12 hours  hydrALAZINE 25 milliGRAM(s) Oral every 8 hours  metoprolol succinate ER 50 milliGRAM(s) Oral daily  predniSONE   Tablet 10 milliGRAM(s) Oral daily  senna 2 Tablet(s) Oral at bedtime  sodium chloride 0.65% Nasal 1 Spray(s) Both Nostrils every 6 hours  tiotropium 2.5 MICROgram(s) Inhaler 2 Puff(s) Inhalation daily    MEDICATIONS  (PRN):  bisacodyl 5 milliGRAM(s) Oral every 12 hours PRN Constipation    Vital Signs Last 24 Hrs  T(F): 97.5 (31 Dec 2022 13:05), Max: 98.1 (31 Dec 2022 05:07)  HR: 83 (31 Dec 2022 13:05) (70 - 85)  BP: 150/61 (31 Dec 2022 13:05) (129/71 - 155/64)  RR: 16 (31 Dec 2022 13:05) (16 - 18)  SpO2: 95% (31 Dec 2022 13:05) (94% - 98%)  I&O's Summary    30 Dec 2022 07:01  -  31 Dec 2022 07:00  --------------------------------------------------------  IN: 700 mL / OUT: 1500 mL / NET: -800 mL      PHYSICAL EXAM:  GENERAL: NAD, sitting in chair  HEAD:  Atraumatic, Normocephalic  EYES: conjunctiva and sclera clear  ENMT: Moist mucous membranes, Good dentition, no thrush  CHEST/LUNG: diminished throughout, wheezing auscultated with rhonchi throughout   HEART: RRR; S1/S2, No murmur  ABDOMEN: Soft, Nontender, Nondistended; Bowel sounds present  EXTREMITIES: No calf tenderness, No cyanosis, 1+ pitting edema b/l LE  SKIN: Warm, perfused  PSYCH: Normal mood, Normal affect    LABS:                        10.8   12.88 )-----------( 257      ( 31 Dec 2022 07:20 )             33.5     12-31    139  |  102  |  60  ----------------------------<  100  3.9   |  28  |  1.33    Ca    8.7      31 Dec 2022 07:20    TPro  6.3  /  Alb  2.1  /  TBili  0.4  /  DBili  x   /  AST  24  /  ALT  23  /  AlkPhos  48  12-31                                    Culture - Blood (collected 27 Dec 2022 11:45)  Source: .Blood Blood-Peripheral  Preliminary Report (28 Dec 2022 15:09):    No growth to date.    Culture - Blood (collected 27 Dec 2022 11:40)  Source: .Blood Blood-Peripheral  Preliminary Report (28 Dec 2022 15:09):    No growth to date.          RADIOLOGY & ADDITIONAL TESTS:    Care Discussed with Consultants/Other Providers:    Patient is a 88y old  Male who presents with a chief complaint of shortness of breath (31 Dec 2022 11:31)      Patient seen and examined at bedside. No events overnight. Currently on 2L NC. Patient continues to have productive cough but states it is better, feels lousy but better than admission. Admits to SOB but improving, denies chest pain, abd pain.    ALLERGIES:  No Known Allergies    MEDICATIONS  (STANDING):  albuterol    90 MICROgram(s) HFA Inhaler 2 Puff(s) Inhalation every 6 hours  allopurinol 100 milliGRAM(s) Oral daily  apixaban 2.5 milliGRAM(s) Oral every 12 hours  aspirin enteric coated 81 milliGRAM(s) Oral daily  benzonatate 200 milliGRAM(s) Oral three times a day  budesonide 160 MICROgram(s)/formoterol 4.5 MICROgram(s) Inhaler 2 Puff(s) Inhalation two times a day  cefepime   IVPB      cefepime   IVPB 1000 milliGRAM(s) IV Intermittent every 12 hours  fluticasone propionate 50 MICROgram(s)/spray Nasal Spray 1 Spray(s) Both Nostrils two times a day  furosemide    Tablet 40 milliGRAM(s) Oral daily  guaiFENesin  milliGRAM(s) Oral every 12 hours  hydrALAZINE 25 milliGRAM(s) Oral every 8 hours  metoprolol succinate ER 50 milliGRAM(s) Oral daily  predniSONE   Tablet 10 milliGRAM(s) Oral daily  senna 2 Tablet(s) Oral at bedtime  sodium chloride 0.65% Nasal 1 Spray(s) Both Nostrils every 6 hours  tiotropium 2.5 MICROgram(s) Inhaler 2 Puff(s) Inhalation daily    MEDICATIONS  (PRN):  bisacodyl 5 milliGRAM(s) Oral every 12 hours PRN Constipation    Vital Signs Last 24 Hrs  T(F): 97.5 (31 Dec 2022 13:05), Max: 98.1 (31 Dec 2022 05:07)  HR: 83 (31 Dec 2022 13:05) (70 - 85)  BP: 150/61 (31 Dec 2022 13:05) (129/71 - 155/64)  RR: 16 (31 Dec 2022 13:05) (16 - 18)  SpO2: 95% (31 Dec 2022 13:05) (94% - 98%)  I&O's Summary    30 Dec 2022 07:01  -  31 Dec 2022 07:00  --------------------------------------------------------  IN: 700 mL / OUT: 1500 mL / NET: -800 mL      PHYSICAL EXAM:  GENERAL: NAD, sitting in chair  HEAD:  Atraumatic, Normocephalic  EYES: conjunctiva and sclera clear  ENMT: Moist mucous membranes, Good dentition, no thrush  CHEST/LUNG: diminished throughout, wheezing auscultated with rhonchi throughout   HEART: RRR; S1/S2, No murmur  ABDOMEN: Soft, Nontender, Nondistended; Bowel sounds present  EXTREMITIES: No calf tenderness, No cyanosis, 1+ pitting edema b/l LE  SKIN: Warm, perfused  PSYCH: Normal mood, Normal affect    LABS:                        10.8   12.88 )-----------( 257      ( 31 Dec 2022 07:20 )             33.5     12-31    139  |  102  |  60  ----------------------------<  100  3.9   |  28  |  1.33    Ca    8.7      31 Dec 2022 07:20    TPro  6.3  /  Alb  2.1  /  TBili  0.4  /  DBili  x   /  AST  24  /  ALT  23  /  AlkPhos  48  12-31                                    Culture - Blood (collected 27 Dec 2022 11:45)  Source: .Blood Blood-Peripheral  Preliminary Report (28 Dec 2022 15:09):    No growth to date.    Culture - Blood (collected 27 Dec 2022 11:40)  Source: .Blood Blood-Peripheral  Preliminary Report (28 Dec 2022 15:09):    No growth to date.          RADIOLOGY & ADDITIONAL TESTS:    Care Discussed with Consultants/Other Providers:

## 2022-12-31 NOTE — PROGRESS NOTE ADULT - ASSESSMENT
88 year old male with a PMH of CVA, CHF, HTN, afib on eliquis, hip fx s/p fall and prior intubation for HF exacerbation presenting to [facility] ED for SOB and CP admitted to ICU for acute hypoxix respiratory failure, flash pulmonary edema and acute CHF exacerbation also found to be Flu A positive now downgraded to medical floors    #acute hypoxic respiratory failure  #multifocal PNA  #influenza A  - s/p BiPAP in the ICU and IV lasix  - now on 2L NC, wean off as toelrated  - continue steroid taper- now on prednisone 10mg x4 days  - CT chest showed multifocal PNA  - continue cefepime- course as per ID, day 4 (favor 5-7 days of IV abx as per ID)  - s/p tamiflu treatment  - follow up strept and legionella urine antigen, follow up MRSA nasal PCR  - continue symbicort, nasal spray  - patient does not like duoneb- will change to albuterol inhaler and tiotropium  - continue chest PT   - ID following, recs appreciated- discussed with Dr. Norman  - pulm following, recs appreciated- discussed with Dr. Hernandez    #Acute Pulmonary Edema  #Hypertensive Urgency with Acute on Chronic Combined CHF  - s/p nitro gtt for hypertensive urgency  - Hold ARB due to renal function  - continue daily lasix 40mg qd - follow renal fxn  - continue hydralazine 25 mg q8h, metoprolol succ 50 mg daily  - cardiac follow up to optimize meds  - monitor volume status, daily weights, I/Os, replete lytes as needed    #Elevated Troponin  #NSTEMI, likely type II  - likely demand ischemia  - cardiology appreciated  - cont medical management, cont asa, eliquis, bb    #IRINEO on CKD 3  - hold ARB  - avoid nephrotoxins  - renally adjusted meds  - monitor BMP  - appreciate Renal consult - d/w Dr. Pineda. Cont lasix for now- May need to accept a higher creatinine.      #Chronic Atrial Fibrillation  - continue metoprolol for rate control, eliquis for stroke ppx    #History of CVA  - continue ASA    #DVT PPx  - eliquis adjusted to 2.5 BID for renal fxn    PT- recommend [place]    patient prefers to update family on his own 88 year old male with a PMH of CVA, CHF, HTN, afib on eliquis, hip fx s/p fall and prior intubation for HF exacerbation presenting to [facility] ED for SOB and CP admitted to ICU for acute hypoxix respiratory failure, flash pulmonary edema and acute CHF exacerbation also found to be Flu A positive now downgraded to medical floors    #acute hypoxic respiratory failure  #multifocal PNA  #influenza A  - s/p BiPAP in the ICU and IV lasix  - now on 2L NC, wean off as toelrated  - continue steroid taper- now on prednisone 10mg x4 days  - CT chest showed multifocal PNA  - continue cefepime- course as per ID, day 4 (favor 5-7 days of IV abx as per ID)  - s/p tamiflu treatment  - follow up strept and legionella urine antigen, follow up MRSA nasal PCR  - continue symbicort, nasal spray  - patient does not like duoneb- will change to albuterol inhaler and tiotropium  - continue chest PT   - ID following, recs appreciated- discussed with Dr. Norman  - pulm following, recs appreciated- discussed with Dr. Hernandez  - will get CXR to monitor pna    #Acute Pulmonary Edema  #Hypertensive Urgency with Acute on Chronic Combined CHF  - s/p nitro gtt for hypertensive urgency  - Hold ARB due to renal function  - continue daily lasix 40mg qd - follow renal fxn  - continue hydralazine 25 mg q8h, metoprolol succ 50 mg daily  - cardiac follow up to optimize meds  - monitor volume status, daily weights, I/Os, replete lytes as needed    #Elevated Troponin  #NSTEMI, likely type II  - likely demand ischemia  - cardiology appreciated  - cont medical management, cont asa, eliquis, bb    #IRINEO on CKD 3  - hold ARB  - avoid nephrotoxins  - renally adjusted meds  - monitor BMP  - appreciate Renal consult - d/w Dr. Pineda. Cont lasix for now- May need to accept a higher creatinine.      #Chronic Atrial Fibrillation  - continue metoprolol for rate control, eliquis for stroke ppx    #History of CVA  - continue ASA    #DVT PPx  - eliquis adjusted to 2.5 BID for renal fxn    PT- recommend [place]    patient prefers to update family on his own

## 2022-12-31 NOTE — PROGRESS NOTE ADULT - ASSESSMENT
88y male with hx of CVA, CHF, HTN, afib, hip fx s/p fall. He preseted to  [facility] on 12/18  with SOB and CP, elevated sbp to 200's. He was treated with NIV, lasix and nitro. He tested +Influenza A with mild elevated troponin, elevated bnp and ?IRINEO. He completed renally dosed Tamiflu and prednisone taper, currently 20 mg daily. He cont to feel SOB and with rising WBC and hence ID eval requested.   He denies any fevers or chills, has weak, unproductive cough  CT chest with multifocal infiltrates  PC is .2, legionella urine antigen negative, strep pneumo urine antigen negative,and his blood cultures are negative.  MRSA --negative  He appears to be making slow favorable progress  Suggest  1. Cont Cefepime to cover for hospital acquired pneumonia, day 4, favor 5-7 days  2.Diurese as per medical services .Steroids per pulmonary  3.NSTEMI per cardiology  4.Suspect dyspnea multifactorial-CHF, post influenza,bacterial  5. Monitor WBC trend--slowly moderating, may also be increased secondary to prednisone.

## 2022-12-31 NOTE — PROGRESS NOTE ADULT - SUBJECTIVE AND OBJECTIVE BOX
CC: f/u for pneumonia and influenza    Patient reports: he is alert and comfortable, minimal cough    REVIEW OF SYSTEMS:  All other review of systems negative (Comprehensive ROS)    Antimicrobials Day #  day 4:  cefepime   IVPB      cefepime   IVPB 1000 milliGRAM(s) IV Intermittent every 12 hours    Other Medications Reviewed  MEDICATIONS  (STANDING):  albuterol    90 MICROgram(s) HFA Inhaler 2 Puff(s) Inhalation every 6 hours  allopurinol 100 milliGRAM(s) Oral daily  apixaban 2.5 milliGRAM(s) Oral every 12 hours  aspirin enteric coated 81 milliGRAM(s) Oral daily  benzonatate 200 milliGRAM(s) Oral three times a day  budesonide 160 MICROgram(s)/formoterol 4.5 MICROgram(s) Inhaler 2 Puff(s) Inhalation two times a day  cefepime   IVPB      cefepime   IVPB 1000 milliGRAM(s) IV Intermittent every 12 hours  fluticasone propionate 50 MICROgram(s)/spray Nasal Spray 1 Spray(s) Both Nostrils two times a day  furosemide    Tablet 40 milliGRAM(s) Oral daily  guaiFENesin  milliGRAM(s) Oral every 12 hours  hydrALAZINE 25 milliGRAM(s) Oral every 8 hours  metoprolol succinate ER 50 milliGRAM(s) Oral daily  predniSONE   Tablet 10 milliGRAM(s) Oral daily  senna 2 Tablet(s) Oral at bedtime  sodium chloride 0.65% Nasal 1 Spray(s) Both Nostrils every 6 hours  tiotropium 2.5 MICROgram(s) Inhaler 2 Puff(s) Inhalation daily    T(F): 98.1 (12-31-22 @ 05:07), Max: 98.1 (12-31-22 @ 05:07)  HR: 79 (12-31-22 @ 09:03)  BP: 155/64 (12-31-22 @ 05:07)  RR: 16 (12-31-22 @ 05:07)  SpO2: 98% (12-31-22 @ 09:03)  Wt(kg): --    PHYSICAL EXAM:  General: alert, no acute distress on 3 liters  Eyes:  anicteric, no conjunctival injection, no discharge  Oropharynx: no lesions or injection 	  Neck: supple, without adenopathy  Lungs: distant BS  Heart: irregular rate and rhythm; no murmur, rubs or gallops  Abdomen: soft, nondistended, nontender, without mass or organomegaly  Skin: no lesions  Extremities: no clubbing, cyanosis, or edema  Neurologic: alert, oriented, moves all extremities    LAB RESULTS:                        10.8   12.88 )-----------( 257      ( 31 Dec 2022 07:20 )             33.5     12-31    139  |  102  |  60<H>  ----------------------------<  100<H>  3.9   |  28  |  1.33<H>    Ca    8.7      31 Dec 2022 07:20    TPro  6.3  /  Alb  2.1<L>  /  TBili  0.4  /  DBili  x   /  AST  24  /  ALT  23  /  AlkPhos  48  12-31    LIVER FUNCTIONS - ( 31 Dec 2022 07:20 )  Alb: 2.1 g/dL / Pro: 6.3 g/dL / ALK PHOS: 48 U/L / ALT: 23 U/L / AST: 24 U/L / GGT: x             MICROBIOLOGY:  RECENT CULTURES:  12-27 @ 11:45 .Blood Blood-Peripheral     No growth to date.      12-27 @ 11:40 .Blood Blood-Peripheral     No growth to date.          RADIOLOGY REVIEWED:    < from: CT Chest No Cont (12.27.22 @ 08:57) >  IMPRESSION:    Consolidations within bilateral lower lobes and patchy/nodular opacities   within all lobes likely representing multifocal pneumonia. Small left   pleural effusion.    < end of copied text >

## 2022-12-31 NOTE — PROGRESS NOTE ADULT - SUBJECTIVE AND OBJECTIVE BOX
Follow-up Pulmonary Progress Note  Chief Complaint : Heart failure      pt states cough and sob better  but states feels lousy , unable to give reasoning other than food(lunch) and PT came at same time.  sat 99% on 2 L, tapered to 1 L        Allergies :No Known Allergies      PAST MEDICAL & SURGICAL HISTORY:  Afib    Congestive heart failure (CHF)    CVA (cerebral vascular accident)    Hypertension, unspecified type    No significant past surgical history        Medications:  MEDICATIONS  (STANDING):  albuterol    90 MICROgram(s) HFA Inhaler 2 Puff(s) Inhalation every 6 hours  allopurinol 100 milliGRAM(s) Oral daily  apixaban 2.5 milliGRAM(s) Oral every 12 hours  aspirin enteric coated 81 milliGRAM(s) Oral daily  benzonatate 200 milliGRAM(s) Oral three times a day  budesonide 160 MICROgram(s)/formoterol 4.5 MICROgram(s) Inhaler 2 Puff(s) Inhalation two times a day  cefepime   IVPB      cefepime   IVPB 1000 milliGRAM(s) IV Intermittent every 12 hours  fluticasone propionate 50 MICROgram(s)/spray Nasal Spray 1 Spray(s) Both Nostrils two times a day  furosemide    Tablet 40 milliGRAM(s) Oral daily  guaiFENesin  milliGRAM(s) Oral every 12 hours  hydrALAZINE 25 milliGRAM(s) Oral every 8 hours  metoprolol succinate ER 50 milliGRAM(s) Oral daily  predniSONE   Tablet 10 milliGRAM(s) Oral daily  senna 2 Tablet(s) Oral at bedtime  sodium chloride 0.65% Nasal 1 Spray(s) Both Nostrils every 6 hours  tiotropium 2.5 MICROgram(s) Inhaler 2 Puff(s) Inhalation daily    MEDICATIONS  (PRN):  bisacodyl 5 milliGRAM(s) Oral every 12 hours PRN Constipation      Antibiotics History  cefepime   IVPB    , 12-27-22 @ 15:37  cefepime   IVPB 1000 milliGRAM(s) IV Intermittent once, 12-27-22 @ 14:00  cefepime   IVPB 1000 milliGRAM(s) IV Intermittent every 12 hours, 12-28-22 @ 06:00  cefTRIAXone   IVPB    , 12-27-22 @ 07:30  cefTRIAXone   IVPB 1000 milliGRAM(s) IV Intermittent once, 12-27-22 @ 07:30, Stop order after: 1 Doses  cefTRIAXone   IVPB 1000 milliGRAM(s) IV Intermittent every 24 hours, 12-28-22 @ 07:30, Stop order after: 7 Days  oseltamivir 75 milliGRAM(s) Oral once, 12-18-22 @ 20:55, Stop order after: 1 Doses  oseltamivir 30 milliGRAM(s) Oral every 12 hours, 12-19-22 @ 09:00, Stop order after: 4 Days  oseltamivir 30 milliGRAM(s) Oral daily, 12-23-22 @ 00:00, Stop order after: 1 Doses      Heme Medications   apixaban 2.5 milliGRAM(s) Oral every 12 hours, 12-22-22 @ 13:59  aspirin enteric coated 81 milliGRAM(s) Oral daily, 12-17-22 @ 11:33      GI Medications  bisacodyl 5 milliGRAM(s) Oral every 12 hours, 12-29-22 @ 14:59, Routine PRN  senna 2 Tablet(s) Oral at bedtime, 12-17-22 @ 11:34, Routine        LABS:                        10.8   12.88 )-----------( 257      ( 31 Dec 2022 07:20 )             33.5     12-31    139  |  102  |  60<H>  ----------------------------<  100<H>  3.9   |  28  |  1.33<H>    Ca    8.7      31 Dec 2022 07:20    TPro  6.3  /  Alb  2.1<L>  /  TBili  0.4  /  DBili  x   /  AST  24  /  ALT  23  /  AlkPhos  48  12-31         CULTURES: (if applicable)    Culture - Blood (collected 12-27-22 @ 11:45)  Source: .Blood Blood-Peripheral  Preliminary Report (12-28-22 @ 15:09):    No growth to date.    Culture - Blood (collected 12-27-22 @ 11:40)  Source: .Blood Blood-Peripheral  Preliminary Report (12-28-22 @ 15:09):    No growth to date.         VITALS:  T(C): 36.4 (12-31-22 @ 13:05), Max: 36.7 (12-31-22 @ 05:07)  T(F): 97.5 (12-31-22 @ 13:05), Max: 98.1 (12-31-22 @ 05:07)  HR: 83 (12-31-22 @ 13:05) (70 - 85)  BP: 150/61 (12-31-22 @ 13:05) (129/71 - 155/64)  BP(mean): --  ABP: --  ABP(mean): --  RR: 16 (12-31-22 @ 13:05) (16 - 18)  SpO2: 95% (12-31-22 @ 13:05) (94% - 98%)  CVP(mm Hg): --  CVP(cm H2O): --    Ins and Outs     12-30-22 @ 07:01  -  12-31-22 @ 07:00  --------------------------------------------------------  IN: 700 mL / OUT: 1500 mL / NET: -800 mL                I&O's Detail    30 Dec 2022 07:01  -  31 Dec 2022 07:00  --------------------------------------------------------  IN:    Oral Fluid: 700 mL  Total IN: 700 mL    OUT:    Voided (mL): 1500 mL  Total OUT: 1500 mL    Total NET: -800 mL

## 2022-12-31 NOTE — PROGRESS NOTE ADULT - ASSESSMENT
Physical Examination:  GENERAL:               Alert,  no acute distress.    HEENT:                    + JVD, Moist MM  PULM:                     Bilateral air entry, Clear to auscultation bilaterally, no significant sputum production, no Rales, No Rhonchi, trace Wheezing  CVS:                         S1, S2,  +Murmur  ABD:                        Soft, nondistended, nontender, normoactive bowel sounds,   EXT:                         mild edema, nontender, No Cyanosis or Clubbing   NEURO:                  Alert, oriented, interactive, nonfocal, follows commands  PSYC:                      Calm, + Insight.        88 year old male with a PMH of CVA, CHF, HTN, afib on eliquis, hip fx s/p fall and prior intubation for HF exacerbation presenting to [facility] ED for SOB and CP. Pt noted to be hypoxemic with elevated sbp to 200's. Pt tx with NIV, lasix and nitro with +response. ICU team consulted. Influenza A + with mild elevated troponin, elevated bnp and ?IRINEO on labs. When we arrived pt was alert and oriented, with O2 sat 100%, rate controlled afib on monitor and sbp 140. Pt trial off bipap with NC ~4 liters without desaturation or respiratory distress. ABG obtained with the following results: 7.37 /  45 / 87 / 26 97% on 4 liters NC. Troponin mildly elevated and has stabilized with no major change and ekg is without acute ischemia noted currently appearing to be demand ischemia at this time (also he is on AC at home), cardiology contacted by ED MD. BNP elevated to 6k. Given pt appears comfortable and states that he is "feeling better" along with his neurological, respiratory (off NIV) and hemodynamic stability  he is not currently a candidate for ICU and is appropriate for monitoring on telemetry under medicine service. However, if any changes should occur please contact our team immediately. Thank you. Recommending the following:    Assessment  1. Influenza A now with multifocal pna  2. 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 Elevated trop, r/o NSTEMI  4. Underlying Afib on Eliquis, HTN, CVA,   5. Increasing Cr / IRINEO normal baseline    Plan   Taper n/c o2 as tolerated, goal taper to off  Finish course of antibiotics as per ID  Continue Symbicort, duoneb  repeat CXR in am    Oral diet  encouraged incentive spirometry   prednisone taper to off as ordered     ASA, A/c with Eliquis     case d/w Dr. Aguilar     Goals of Care: Full code

## 2023-01-01 ENCOUNTER — TRANSCRIPTION ENCOUNTER (OUTPATIENT)
Age: 88
End: 2023-01-01

## 2023-01-01 ENCOUNTER — INPATIENT (INPATIENT)
Facility: HOSPITAL | Age: 88
LOS: 4 days | Discharge: LEFT BEFORE DISCHARGE | DRG: 291 | End: 2023-11-27
Attending: FAMILY MEDICINE | Admitting: INTERNAL MEDICINE
Payer: MEDICARE

## 2023-01-01 ENCOUNTER — INPATIENT (INPATIENT)
Facility: HOSPITAL | Age: 88
LOS: 13 days | Discharge: ANOTHER HOSPITAL | DRG: 291 | End: 2024-01-01
Attending: FAMILY MEDICINE | Admitting: HOSPITALIST
Payer: MEDICARE

## 2023-01-01 ENCOUNTER — INPATIENT (INPATIENT)
Facility: HOSPITAL | Age: 88
LOS: 6 days | Discharge: HOME | DRG: 291 | End: 2023-09-25
Attending: STUDENT IN AN ORGANIZED HEALTH CARE EDUCATION/TRAINING PROGRAM | Admitting: STUDENT IN AN ORGANIZED HEALTH CARE EDUCATION/TRAINING PROGRAM
Payer: MEDICARE

## 2023-01-01 ENCOUNTER — EMERGENCY (EMERGENCY)
Facility: HOSPITAL | Age: 88
LOS: 1 days | Discharge: LEFT BEFORE DISCHARGE | End: 2023-01-01
Attending: STUDENT IN AN ORGANIZED HEALTH CARE EDUCATION/TRAINING PROGRAM | Admitting: INTERNAL MEDICINE
Payer: MEDICARE

## 2023-01-01 VITALS
HEIGHT: 66 IN | WEIGHT: 162.92 LBS | RESPIRATION RATE: 19 BRPM | SYSTOLIC BLOOD PRESSURE: 141 MMHG | TEMPERATURE: 97 F | OXYGEN SATURATION: 100 % | DIASTOLIC BLOOD PRESSURE: 69 MMHG | HEART RATE: 65 BPM

## 2023-01-01 VITALS
SYSTOLIC BLOOD PRESSURE: 171 MMHG | DIASTOLIC BLOOD PRESSURE: 83 MMHG | RESPIRATION RATE: 16 BRPM | TEMPERATURE: 99 F | HEART RATE: 87 BPM | OXYGEN SATURATION: 94 %

## 2023-01-01 VITALS — HEART RATE: 76 BPM | SYSTOLIC BLOOD PRESSURE: 113 MMHG | DIASTOLIC BLOOD PRESSURE: 50 MMHG

## 2023-01-01 VITALS
WEIGHT: 154.98 LBS | HEIGHT: 66 IN | DIASTOLIC BLOOD PRESSURE: 78 MMHG | HEART RATE: 97 BPM | TEMPERATURE: 98 F | SYSTOLIC BLOOD PRESSURE: 122 MMHG | RESPIRATION RATE: 22 BRPM | OXYGEN SATURATION: 94 %

## 2023-01-01 VITALS — OXYGEN SATURATION: 87 %

## 2023-01-01 VITALS
SYSTOLIC BLOOD PRESSURE: 176 MMHG | DIASTOLIC BLOOD PRESSURE: 56 MMHG | OXYGEN SATURATION: 94 % | HEIGHT: 66 IN | HEART RATE: 73 BPM | TEMPERATURE: 99 F | WEIGHT: 160.06 LBS

## 2023-01-01 VITALS — OXYGEN SATURATION: 96 %

## 2023-01-01 DIAGNOSIS — I10 ESSENTIAL (PRIMARY) HYPERTENSION: ICD-10-CM

## 2023-01-01 DIAGNOSIS — I48.91 UNSPECIFIED ATRIAL FIBRILLATION: ICD-10-CM

## 2023-01-01 DIAGNOSIS — T14.8XXA OTHER INJURY OF UNSPECIFIED BODY REGION, INITIAL ENCOUNTER: ICD-10-CM

## 2023-01-01 DIAGNOSIS — J90 PLEURAL EFFUSION, NOT ELSEWHERE CLASSIFIED: ICD-10-CM

## 2023-01-01 DIAGNOSIS — I50.33 ACUTE ON CHRONIC DIASTOLIC (CONGESTIVE) HEART FAILURE: ICD-10-CM

## 2023-01-01 DIAGNOSIS — R07.9 CHEST PAIN, UNSPECIFIED: ICD-10-CM

## 2023-01-01 DIAGNOSIS — I63.9 CEREBRAL INFARCTION, UNSPECIFIED: ICD-10-CM

## 2023-01-01 DIAGNOSIS — B34.1 ENTEROVIRUS INFECTION, UNSPECIFIED: ICD-10-CM

## 2023-01-01 DIAGNOSIS — N18.30 CHRONIC KIDNEY DISEASE, STAGE 3 UNSPECIFIED: ICD-10-CM

## 2023-01-01 DIAGNOSIS — I50.9 HEART FAILURE, UNSPECIFIED: ICD-10-CM

## 2023-01-01 DIAGNOSIS — E78.5 HYPERLIPIDEMIA, UNSPECIFIED: ICD-10-CM

## 2023-01-01 DIAGNOSIS — I48.20 CHRONIC ATRIAL FIBRILLATION, UNSPECIFIED: ICD-10-CM

## 2023-01-01 DIAGNOSIS — Z29.9 ENCOUNTER FOR PROPHYLACTIC MEASURES, UNSPECIFIED: ICD-10-CM

## 2023-01-01 DIAGNOSIS — J96.01 ACUTE RESPIRATORY FAILURE WITH HYPOXIA: ICD-10-CM

## 2023-01-01 DIAGNOSIS — L50.9 URTICARIA, UNSPECIFIED: ICD-10-CM

## 2023-01-01 DIAGNOSIS — J44.9 CHRONIC OBSTRUCTIVE PULMONARY DISEASE, UNSPECIFIED: ICD-10-CM

## 2023-01-01 DIAGNOSIS — Z78.9 OTHER SPECIFIED HEALTH STATUS: ICD-10-CM

## 2023-01-01 DIAGNOSIS — I50.23 ACUTE ON CHRONIC SYSTOLIC (CONGESTIVE) HEART FAILURE: ICD-10-CM

## 2023-01-01 DIAGNOSIS — R04.0 EPISTAXIS: ICD-10-CM

## 2023-01-01 LAB
A1C WITH ESTIMATED AVERAGE GLUCOSE RESULT: 5.8 % — HIGH (ref 4–5.6)
A1C WITH ESTIMATED AVERAGE GLUCOSE RESULT: 5.8 % — HIGH (ref 4–5.6)
ALBUMIN FLD-MCNC: 1.8 G/DL — SIGNIFICANT CHANGE UP
ALBUMIN FLD-MCNC: 1.8 G/DL — SIGNIFICANT CHANGE UP
ALBUMIN FLD-MCNC: 2.4 G/DL — SIGNIFICANT CHANGE UP
ALBUMIN FLD-MCNC: 2.4 G/DL — SIGNIFICANT CHANGE UP
ALBUMIN SERPL ELPH-MCNC: 2.5 G/DL — LOW (ref 3.3–5)
ALBUMIN SERPL ELPH-MCNC: 2.6 G/DL — LOW (ref 3.3–5)
ALBUMIN SERPL ELPH-MCNC: 2.7 G/DL — LOW (ref 3.3–5)
ALBUMIN SERPL ELPH-MCNC: 2.9 G/DL — LOW (ref 3.3–5)
ALBUMIN SERPL ELPH-MCNC: 2.9 G/DL — LOW (ref 3.3–5)
ALBUMIN SERPL ELPH-MCNC: 3 G/DL — LOW (ref 3.3–5)
ALP SERPL-CCNC: 60 U/L — SIGNIFICANT CHANGE UP (ref 40–120)
ALP SERPL-CCNC: 60 U/L — SIGNIFICANT CHANGE UP (ref 40–120)
ALP SERPL-CCNC: 61 U/L — SIGNIFICANT CHANGE UP (ref 40–120)
ALP SERPL-CCNC: 62 U/L — SIGNIFICANT CHANGE UP (ref 40–120)
ALP SERPL-CCNC: 63 U/L — SIGNIFICANT CHANGE UP (ref 40–120)
ALP SERPL-CCNC: 63 U/L — SIGNIFICANT CHANGE UP (ref 40–120)
ALP SERPL-CCNC: 64 U/L — SIGNIFICANT CHANGE UP (ref 40–120)
ALP SERPL-CCNC: 64 U/L — SIGNIFICANT CHANGE UP (ref 40–120)
ALP SERPL-CCNC: 66 U/L — SIGNIFICANT CHANGE UP (ref 40–120)
ALP SERPL-CCNC: 66 U/L — SIGNIFICANT CHANGE UP (ref 40–120)
ALP SERPL-CCNC: 67 U/L — SIGNIFICANT CHANGE UP (ref 40–120)
ALP SERPL-CCNC: 67 U/L — SIGNIFICANT CHANGE UP (ref 40–120)
ALP SERPL-CCNC: 68 U/L — SIGNIFICANT CHANGE UP (ref 40–120)
ALP SERPL-CCNC: 69 U/L — SIGNIFICANT CHANGE UP (ref 40–120)
ALP SERPL-CCNC: 76 U/L — SIGNIFICANT CHANGE UP (ref 40–120)
ALT FLD-CCNC: 14 U/L — SIGNIFICANT CHANGE UP (ref 10–45)
ALT FLD-CCNC: 15 U/L — SIGNIFICANT CHANGE UP (ref 10–45)
ALT FLD-CCNC: 16 U/L — SIGNIFICANT CHANGE UP (ref 10–45)
ALT FLD-CCNC: 17 U/L — SIGNIFICANT CHANGE UP (ref 10–45)
ALT FLD-CCNC: 17 U/L — SIGNIFICANT CHANGE UP (ref 10–45)
ALT FLD-CCNC: 18 U/L — SIGNIFICANT CHANGE UP (ref 10–45)
ALT FLD-CCNC: 19 U/L — SIGNIFICANT CHANGE UP (ref 10–45)
ALT FLD-CCNC: 21 U/L — SIGNIFICANT CHANGE UP (ref 10–45)
ANION GAP SERPL CALC-SCNC: 10 MMOL/L — SIGNIFICANT CHANGE UP (ref 5–17)
ANION GAP SERPL CALC-SCNC: 11 MMOL/L — SIGNIFICANT CHANGE UP (ref 5–17)
ANION GAP SERPL CALC-SCNC: 12 MMOL/L — SIGNIFICANT CHANGE UP (ref 5–17)
ANION GAP SERPL CALC-SCNC: 3 MMOL/L — LOW (ref 5–17)
ANION GAP SERPL CALC-SCNC: 3 MMOL/L — LOW (ref 5–17)
ANION GAP SERPL CALC-SCNC: 5 MMOL/L — SIGNIFICANT CHANGE UP (ref 5–17)
ANION GAP SERPL CALC-SCNC: 5 MMOL/L — SIGNIFICANT CHANGE UP (ref 5–17)
ANION GAP SERPL CALC-SCNC: 6 MMOL/L — SIGNIFICANT CHANGE UP (ref 5–17)
ANION GAP SERPL CALC-SCNC: 6 MMOL/L — SIGNIFICANT CHANGE UP (ref 5–17)
ANION GAP SERPL CALC-SCNC: 7 MMOL/L — SIGNIFICANT CHANGE UP (ref 5–17)
ANION GAP SERPL CALC-SCNC: 8 MMOL/L — SIGNIFICANT CHANGE UP (ref 5–17)
ANION GAP SERPL CALC-SCNC: 9 MMOL/L — SIGNIFICANT CHANGE UP (ref 5–17)
APPEARANCE UR: CLEAR — SIGNIFICANT CHANGE UP
APPEARANCE UR: CLEAR — SIGNIFICANT CHANGE UP
APTT BLD: 32.4 SEC — SIGNIFICANT CHANGE UP (ref 24.5–35.6)
APTT BLD: 36.4 SEC — HIGH (ref 24.5–35.6)
APTT BLD: 36.4 SEC — HIGH (ref 24.5–35.6)
APTT BLD: 37.9 SEC — HIGH (ref 24.5–35.6)
APTT BLD: 37.9 SEC — HIGH (ref 24.5–35.6)
AST SERPL-CCNC: 18 U/L — SIGNIFICANT CHANGE UP (ref 10–40)
AST SERPL-CCNC: 18 U/L — SIGNIFICANT CHANGE UP (ref 10–40)
AST SERPL-CCNC: 19 U/L — SIGNIFICANT CHANGE UP (ref 10–40)
AST SERPL-CCNC: 20 U/L — SIGNIFICANT CHANGE UP (ref 10–40)
AST SERPL-CCNC: 21 U/L — SIGNIFICANT CHANGE UP (ref 10–40)
AST SERPL-CCNC: 21 U/L — SIGNIFICANT CHANGE UP (ref 10–40)
AST SERPL-CCNC: 22 U/L — SIGNIFICANT CHANGE UP (ref 10–40)
AST SERPL-CCNC: 23 U/L — SIGNIFICANT CHANGE UP (ref 10–40)
AST SERPL-CCNC: 24 U/L — SIGNIFICANT CHANGE UP (ref 10–40)
AST SERPL-CCNC: 25 U/L — SIGNIFICANT CHANGE UP (ref 10–40)
AST SERPL-CCNC: 42 U/L — HIGH (ref 10–40)
AST SERPL-CCNC: 42 U/L — HIGH (ref 10–40)
B PERT IGG+IGM PNL SER: ABNORMAL
BASE EXCESS BLDA CALC-SCNC: 7.5 MMOL/L — HIGH (ref -2–3)
BASOPHILS # BLD AUTO: 0.02 K/UL — SIGNIFICANT CHANGE UP (ref 0–0.2)
BASOPHILS # BLD AUTO: 0.03 K/UL — SIGNIFICANT CHANGE UP (ref 0–0.2)
BASOPHILS # BLD AUTO: 0.04 K/UL — SIGNIFICANT CHANGE UP (ref 0–0.2)
BASOPHILS # BLD AUTO: 0.09 K/UL — SIGNIFICANT CHANGE UP (ref 0–0.2)
BASOPHILS NFR BLD AUTO: 0.2 % — SIGNIFICANT CHANGE UP (ref 0–2)
BASOPHILS NFR BLD AUTO: 0.2 % — SIGNIFICANT CHANGE UP (ref 0–2)
BASOPHILS NFR BLD AUTO: 0.3 % — SIGNIFICANT CHANGE UP (ref 0–2)
BASOPHILS NFR BLD AUTO: 0.4 % — SIGNIFICANT CHANGE UP (ref 0–2)
BASOPHILS NFR BLD AUTO: 0.4 % — SIGNIFICANT CHANGE UP (ref 0–2)
BASOPHILS NFR BLD AUTO: 0.6 % — SIGNIFICANT CHANGE UP (ref 0–2)
BILIRUB SERPL-MCNC: 0.5 MG/DL — SIGNIFICANT CHANGE UP (ref 0.2–1.2)
BILIRUB SERPL-MCNC: 0.6 MG/DL — SIGNIFICANT CHANGE UP (ref 0.2–1.2)
BILIRUB SERPL-MCNC: 0.7 MG/DL — SIGNIFICANT CHANGE UP (ref 0.2–1.2)
BILIRUB SERPL-MCNC: 0.8 MG/DL — SIGNIFICANT CHANGE UP (ref 0.2–1.2)
BILIRUB SERPL-MCNC: 0.9 MG/DL — SIGNIFICANT CHANGE UP (ref 0.2–1.2)
BILIRUB UR-MCNC: NEGATIVE — SIGNIFICANT CHANGE UP
BILIRUB UR-MCNC: NEGATIVE — SIGNIFICANT CHANGE UP
BLOOD GAS COMMENTS ARTERIAL: SIGNIFICANT CHANGE UP
BUN SERPL-MCNC: 103 MG/DL — HIGH (ref 7–23)
BUN SERPL-MCNC: 103 MG/DL — HIGH (ref 7–23)
BUN SERPL-MCNC: 104 MG/DL — HIGH (ref 7–23)
BUN SERPL-MCNC: 104 MG/DL — HIGH (ref 7–23)
BUN SERPL-MCNC: 105 MG/DL — HIGH (ref 7–23)
BUN SERPL-MCNC: 107 MG/DL — HIGH (ref 7–23)
BUN SERPL-MCNC: 107 MG/DL — HIGH (ref 7–23)
BUN SERPL-MCNC: 109 MG/DL — HIGH (ref 7–23)
BUN SERPL-MCNC: 109 MG/DL — HIGH (ref 7–23)
BUN SERPL-MCNC: 110 MG/DL — HIGH (ref 7–23)
BUN SERPL-MCNC: 111 MG/DL — HIGH (ref 7–23)
BUN SERPL-MCNC: 111 MG/DL — HIGH (ref 7–23)
BUN SERPL-MCNC: 35 MG/DL — HIGH (ref 7–23)
BUN SERPL-MCNC: 39 MG/DL — HIGH (ref 7–23)
BUN SERPL-MCNC: 39 MG/DL — HIGH (ref 7–23)
BUN SERPL-MCNC: 40 MG/DL — HIGH (ref 7–23)
BUN SERPL-MCNC: 41 MG/DL — HIGH (ref 7–23)
BUN SERPL-MCNC: 42 MG/DL — HIGH (ref 7–23)
BUN SERPL-MCNC: 45 MG/DL — HIGH (ref 7–23)
BUN SERPL-MCNC: 46 MG/DL — HIGH (ref 7–23)
BUN SERPL-MCNC: 48 MG/DL — HIGH (ref 7–23)
BUN SERPL-MCNC: 49 MG/DL — HIGH (ref 7–23)
BUN SERPL-MCNC: 50 MG/DL — HIGH (ref 7–23)
BUN SERPL-MCNC: 52 MG/DL — HIGH (ref 7–23)
BUN SERPL-MCNC: 52 MG/DL — HIGH (ref 7–23)
BUN SERPL-MCNC: 53 MG/DL — HIGH (ref 7–23)
BUN SERPL-MCNC: 56 MG/DL — HIGH (ref 7–23)
BUN SERPL-MCNC: 56 MG/DL — HIGH (ref 7–23)
BUN SERPL-MCNC: 83 MG/DL — HIGH (ref 7–23)
BUN SERPL-MCNC: 83 MG/DL — HIGH (ref 7–23)
BUN SERPL-MCNC: 85 MG/DL — HIGH (ref 7–23)
BUN SERPL-MCNC: 85 MG/DL — HIGH (ref 7–23)
BUN SERPL-MCNC: 88 MG/DL — HIGH (ref 7–23)
BUN SERPL-MCNC: 88 MG/DL — HIGH (ref 7–23)
BUN SERPL-MCNC: 93 MG/DL — HIGH (ref 7–23)
BUN SERPL-MCNC: 93 MG/DL — HIGH (ref 7–23)
BUN SERPL-MCNC: 95 MG/DL — HIGH (ref 7–23)
BUN SERPL-MCNC: 95 MG/DL — HIGH (ref 7–23)
CALCIUM SERPL-MCNC: 8.4 MG/DL — SIGNIFICANT CHANGE UP (ref 8.4–10.5)
CALCIUM SERPL-MCNC: 8.5 MG/DL — SIGNIFICANT CHANGE UP (ref 8.4–10.5)
CALCIUM SERPL-MCNC: 8.5 MG/DL — SIGNIFICANT CHANGE UP (ref 8.4–10.5)
CALCIUM SERPL-MCNC: 8.6 MG/DL — SIGNIFICANT CHANGE UP (ref 8.4–10.5)
CALCIUM SERPL-MCNC: 8.7 MG/DL — SIGNIFICANT CHANGE UP (ref 8.4–10.5)
CALCIUM SERPL-MCNC: 8.8 MG/DL — SIGNIFICANT CHANGE UP (ref 8.4–10.5)
CALCIUM SERPL-MCNC: 8.9 MG/DL — SIGNIFICANT CHANGE UP (ref 8.4–10.5)
CALCIUM SERPL-MCNC: 9 MG/DL — SIGNIFICANT CHANGE UP (ref 8.4–10.5)
CALCIUM SERPL-MCNC: 9.1 MG/DL — SIGNIFICANT CHANGE UP (ref 8.4–10.5)
CALCIUM SERPL-MCNC: 9.2 MG/DL — SIGNIFICANT CHANGE UP (ref 8.4–10.5)
CHLORIDE SERPL-SCNC: 100 MMOL/L — SIGNIFICANT CHANGE UP (ref 96–108)
CHLORIDE SERPL-SCNC: 101 MMOL/L — SIGNIFICANT CHANGE UP (ref 96–108)
CHLORIDE SERPL-SCNC: 102 MMOL/L — SIGNIFICANT CHANGE UP (ref 96–108)
CHLORIDE SERPL-SCNC: 103 MMOL/L — SIGNIFICANT CHANGE UP (ref 96–108)
CHLORIDE SERPL-SCNC: 104 MMOL/L — SIGNIFICANT CHANGE UP (ref 96–108)
CHLORIDE SERPL-SCNC: 105 MMOL/L — SIGNIFICANT CHANGE UP (ref 96–108)
CHLORIDE SERPL-SCNC: 99 MMOL/L — SIGNIFICANT CHANGE UP (ref 96–108)
CHOLEST SERPL-MCNC: 128 MG/DL — SIGNIFICANT CHANGE UP
CO2 BLDA-SCNC: 33 MMOL/L — HIGH (ref 19–24)
CO2 SERPL-SCNC: 27 MMOL/L — SIGNIFICANT CHANGE UP (ref 22–31)
CO2 SERPL-SCNC: 28 MMOL/L — SIGNIFICANT CHANGE UP (ref 22–31)
CO2 SERPL-SCNC: 29 MMOL/L — SIGNIFICANT CHANGE UP (ref 22–31)
CO2 SERPL-SCNC: 30 MMOL/L — SIGNIFICANT CHANGE UP (ref 22–31)
CO2 SERPL-SCNC: 31 MMOL/L — SIGNIFICANT CHANGE UP (ref 22–31)
CO2 SERPL-SCNC: 32 MMOL/L — HIGH (ref 22–31)
CO2 SERPL-SCNC: 33 MMOL/L — HIGH (ref 22–31)
CO2 SERPL-SCNC: 34 MMOL/L — HIGH (ref 22–31)
CO2 SERPL-SCNC: 34 MMOL/L — HIGH (ref 22–31)
CO2 SERPL-SCNC: 35 MMOL/L — HIGH (ref 22–31)
CO2 SERPL-SCNC: 36 MMOL/L — HIGH (ref 22–31)
CO2 SERPL-SCNC: 36 MMOL/L — HIGH (ref 22–31)
COLOR FLD: SIGNIFICANT CHANGE UP
COLOR SPEC: YELLOW — SIGNIFICANT CHANGE UP
COLOR SPEC: YELLOW — SIGNIFICANT CHANGE UP
CREAT SERPL-MCNC: 1.37 MG/DL — HIGH (ref 0.5–1.3)
CREAT SERPL-MCNC: 1.44 MG/DL — HIGH (ref 0.5–1.3)
CREAT SERPL-MCNC: 1.44 MG/DL — HIGH (ref 0.5–1.3)
CREAT SERPL-MCNC: 1.46 MG/DL — HIGH (ref 0.5–1.3)
CREAT SERPL-MCNC: 1.46 MG/DL — HIGH (ref 0.5–1.3)
CREAT SERPL-MCNC: 1.5 MG/DL — HIGH (ref 0.5–1.3)
CREAT SERPL-MCNC: 1.5 MG/DL — HIGH (ref 0.5–1.3)
CREAT SERPL-MCNC: 1.55 MG/DL — HIGH (ref 0.5–1.3)
CREAT SERPL-MCNC: 1.56 MG/DL — HIGH (ref 0.5–1.3)
CREAT SERPL-MCNC: 1.57 MG/DL — HIGH (ref 0.5–1.3)
CREAT SERPL-MCNC: 1.6 MG/DL — HIGH (ref 0.5–1.3)
CREAT SERPL-MCNC: 1.6 MG/DL — HIGH (ref 0.5–1.3)
CREAT SERPL-MCNC: 1.62 MG/DL — HIGH (ref 0.5–1.3)
CREAT SERPL-MCNC: 1.63 MG/DL — HIGH (ref 0.5–1.3)
CREAT SERPL-MCNC: 1.65 MG/DL — HIGH (ref 0.5–1.3)
CREAT SERPL-MCNC: 1.66 MG/DL — HIGH (ref 0.5–1.3)
CREAT SERPL-MCNC: 1.66 MG/DL — HIGH (ref 0.5–1.3)
CREAT SERPL-MCNC: 1.67 MG/DL — HIGH (ref 0.5–1.3)
CREAT SERPL-MCNC: 1.67 MG/DL — HIGH (ref 0.5–1.3)
CREAT SERPL-MCNC: 1.68 MG/DL — HIGH (ref 0.5–1.3)
CREAT SERPL-MCNC: 1.68 MG/DL — HIGH (ref 0.5–1.3)
CREAT SERPL-MCNC: 1.71 MG/DL — HIGH (ref 0.5–1.3)
CREAT SERPL-MCNC: 1.73 MG/DL — HIGH (ref 0.5–1.3)
CREAT SERPL-MCNC: 1.73 MG/DL — HIGH (ref 0.5–1.3)
CREAT SERPL-MCNC: 1.77 MG/DL — HIGH (ref 0.5–1.3)
CREAT SERPL-MCNC: 1.77 MG/DL — HIGH (ref 0.5–1.3)
CREAT SERPL-MCNC: 1.89 MG/DL — HIGH (ref 0.5–1.3)
CREAT SERPL-MCNC: 1.89 MG/DL — HIGH (ref 0.5–1.3)
CREAT SERPL-MCNC: 1.9 MG/DL — HIGH (ref 0.5–1.3)
CREAT SERPL-MCNC: 1.9 MG/DL — HIGH (ref 0.5–1.3)
CREAT SERPL-MCNC: 1.92 MG/DL — HIGH (ref 0.5–1.3)
CREAT SERPL-MCNC: 1.92 MG/DL — HIGH (ref 0.5–1.3)
CREAT SERPL-MCNC: 1.95 MG/DL — HIGH (ref 0.5–1.3)
CREAT SERPL-MCNC: 1.95 MG/DL — HIGH (ref 0.5–1.3)
CREAT SERPL-MCNC: 1.98 MG/DL — HIGH (ref 0.5–1.3)
CREAT SERPL-MCNC: 1.98 MG/DL — HIGH (ref 0.5–1.3)
CREAT SERPL-MCNC: 2.03 MG/DL — HIGH (ref 0.5–1.3)
CREAT SERPL-MCNC: 2.03 MG/DL — HIGH (ref 0.5–1.3)
CREAT SERPL-MCNC: 2.05 MG/DL — HIGH (ref 0.5–1.3)
CREAT SERPL-MCNC: 2.05 MG/DL — HIGH (ref 0.5–1.3)
CREAT SERPL-MCNC: 2.06 MG/DL — HIGH (ref 0.5–1.3)
CREAT SERPL-MCNC: 2.06 MG/DL — HIGH (ref 0.5–1.3)
CREAT SERPL-MCNC: 2.09 MG/DL — HIGH (ref 0.5–1.3)
CREAT SERPL-MCNC: 2.09 MG/DL — HIGH (ref 0.5–1.3)
CULTURE RESULTS: NO GROWTH — SIGNIFICANT CHANGE UP
CULTURE RESULTS: NO GROWTH — SIGNIFICANT CHANGE UP
CULTURE RESULTS: SIGNIFICANT CHANGE UP
D DIMER BLD IA.RAPID-MCNC: 1162 NG/ML DDU — HIGH
D DIMER BLD IA.RAPID-MCNC: 5604 NG/ML DDU — SIGNIFICANT CHANGE UP
DIFF PNL FLD: NEGATIVE — SIGNIFICANT CHANGE UP
DIFF PNL FLD: NEGATIVE — SIGNIFICANT CHANGE UP
EGFR: 30 ML/MIN/1.73M2 — LOW
EGFR: 31 ML/MIN/1.73M2 — LOW
EGFR: 31 ML/MIN/1.73M2 — LOW
EGFR: 32 ML/MIN/1.73M2 — LOW
EGFR: 33 ML/MIN/1.73M2 — LOW
EGFR: 34 ML/MIN/1.73M2 — LOW
EGFR: 34 ML/MIN/1.73M2 — LOW
EGFR: 36 ML/MIN/1.73M2 — LOW
EGFR: 36 ML/MIN/1.73M2 — LOW
EGFR: 37 ML/MIN/1.73M2 — LOW
EGFR: 37 ML/MIN/1.73M2 — LOW
EGFR: 38 ML/MIN/1.73M2 — LOW
EGFR: 39 ML/MIN/1.73M2 — LOW
EGFR: 40 ML/MIN/1.73M2 — LOW
EGFR: 41 ML/MIN/1.73M2 — LOW
EGFR: 41 ML/MIN/1.73M2 — LOW
EGFR: 42 ML/MIN/1.73M2 — LOW
EGFR: 44 ML/MIN/1.73M2 — LOW
EGFR: 44 ML/MIN/1.73M2 — LOW
EGFR: 46 ML/MIN/1.73M2 — LOW
EGFR: 49 ML/MIN/1.73M2 — LOW
EOSINOPHIL # BLD AUTO: 0.06 K/UL — SIGNIFICANT CHANGE UP (ref 0–0.5)
EOSINOPHIL # BLD AUTO: 0.17 K/UL — SIGNIFICANT CHANGE UP (ref 0–0.5)
EOSINOPHIL # BLD AUTO: 0.25 K/UL — SIGNIFICANT CHANGE UP (ref 0–0.5)
EOSINOPHIL # BLD AUTO: 0.26 K/UL — SIGNIFICANT CHANGE UP (ref 0–0.5)
EOSINOPHIL # BLD AUTO: 0.26 K/UL — SIGNIFICANT CHANGE UP (ref 0–0.5)
EOSINOPHIL # BLD AUTO: 0.3 K/UL — SIGNIFICANT CHANGE UP (ref 0–0.5)
EOSINOPHIL # BLD AUTO: 0.3 K/UL — SIGNIFICANT CHANGE UP (ref 0–0.5)
EOSINOPHIL # BLD AUTO: 0.38 K/UL — SIGNIFICANT CHANGE UP (ref 0–0.5)
EOSINOPHIL # BLD AUTO: 0.38 K/UL — SIGNIFICANT CHANGE UP (ref 0–0.5)
EOSINOPHIL # BLD AUTO: 0.4 K/UL — SIGNIFICANT CHANGE UP (ref 0–0.5)
EOSINOPHIL # BLD AUTO: 0.4 K/UL — SIGNIFICANT CHANGE UP (ref 0–0.5)
EOSINOPHIL # FLD: 3 % — SIGNIFICANT CHANGE UP
EOSINOPHIL # FLD: 3 % — SIGNIFICANT CHANGE UP
EOSINOPHIL NFR BLD AUTO: 0.4 % — SIGNIFICANT CHANGE UP (ref 0–6)
EOSINOPHIL NFR BLD AUTO: 1.4 % — SIGNIFICANT CHANGE UP (ref 0–6)
EOSINOPHIL NFR BLD AUTO: 2.7 % — SIGNIFICANT CHANGE UP (ref 0–6)
EOSINOPHIL NFR BLD AUTO: 3.1 % — SIGNIFICANT CHANGE UP (ref 0–6)
EOSINOPHIL NFR BLD AUTO: 3.1 % — SIGNIFICANT CHANGE UP (ref 0–6)
EOSINOPHIL NFR BLD AUTO: 3.8 % — SIGNIFICANT CHANGE UP (ref 0–6)
EOSINOPHIL NFR BLD AUTO: 3.8 % — SIGNIFICANT CHANGE UP (ref 0–6)
EOSINOPHIL NFR BLD AUTO: 4.3 % — SIGNIFICANT CHANGE UP (ref 0–6)
EOSINOPHIL NFR BLD AUTO: 4.3 % — SIGNIFICANT CHANGE UP (ref 0–6)
EOSINOPHIL NFR BLD AUTO: 5.2 % — SIGNIFICANT CHANGE UP (ref 0–6)
EOSINOPHIL NFR BLD AUTO: 5.2 % — SIGNIFICANT CHANGE UP (ref 0–6)
ESTIMATED AVERAGE GLUCOSE: 120 MG/DL — HIGH (ref 68–114)
ESTIMATED AVERAGE GLUCOSE: 120 MG/DL — HIGH (ref 68–114)
FLUID INTAKE SUBSTANCE CLASS: SIGNIFICANT CHANGE UP
FOLATE SERPL-MCNC: 17.9 NG/ML — SIGNIFICANT CHANGE UP
FOLATE SERPL-MCNC: 17.9 NG/ML — SIGNIFICANT CHANGE UP
FOLATE SERPL-MCNC: 19.9 NG/ML — SIGNIFICANT CHANGE UP
FOLATE SERPL-MCNC: 19.9 NG/ML — SIGNIFICANT CHANGE UP
FOLATE+VIT B12 SERBLD-IMP: 0 % — SIGNIFICANT CHANGE UP
FOLATE+VIT B12 SERBLD-IMP: 0 % — SIGNIFICANT CHANGE UP
GAS PNL BLDA: SIGNIFICANT CHANGE UP
GLUCOSE FLD-MCNC: 95 MG/DL — SIGNIFICANT CHANGE UP
GLUCOSE FLD-MCNC: 95 MG/DL — SIGNIFICANT CHANGE UP
GLUCOSE FLD-MCNC: 98 MG/DL — SIGNIFICANT CHANGE UP
GLUCOSE FLD-MCNC: 98 MG/DL — SIGNIFICANT CHANGE UP
GLUCOSE SERPL-MCNC: 101 MG/DL — HIGH (ref 70–99)
GLUCOSE SERPL-MCNC: 102 MG/DL — HIGH (ref 70–99)
GLUCOSE SERPL-MCNC: 102 MG/DL — HIGH (ref 70–99)
GLUCOSE SERPL-MCNC: 104 MG/DL — HIGH (ref 70–99)
GLUCOSE SERPL-MCNC: 106 MG/DL — HIGH (ref 70–99)
GLUCOSE SERPL-MCNC: 106 MG/DL — HIGH (ref 70–99)
GLUCOSE SERPL-MCNC: 109 MG/DL — HIGH (ref 70–99)
GLUCOSE SERPL-MCNC: 112 MG/DL — HIGH (ref 70–99)
GLUCOSE SERPL-MCNC: 112 MG/DL — HIGH (ref 70–99)
GLUCOSE SERPL-MCNC: 114 MG/DL — HIGH (ref 70–99)
GLUCOSE SERPL-MCNC: 115 MG/DL — HIGH (ref 70–99)
GLUCOSE SERPL-MCNC: 115 MG/DL — HIGH (ref 70–99)
GLUCOSE SERPL-MCNC: 119 MG/DL — HIGH (ref 70–99)
GLUCOSE SERPL-MCNC: 120 MG/DL — HIGH (ref 70–99)
GLUCOSE SERPL-MCNC: 120 MG/DL — HIGH (ref 70–99)
GLUCOSE SERPL-MCNC: 122 MG/DL — HIGH (ref 70–99)
GLUCOSE SERPL-MCNC: 124 MG/DL — HIGH (ref 70–99)
GLUCOSE SERPL-MCNC: 124 MG/DL — HIGH (ref 70–99)
GLUCOSE SERPL-MCNC: 126 MG/DL — HIGH (ref 70–99)
GLUCOSE SERPL-MCNC: 126 MG/DL — HIGH (ref 70–99)
GLUCOSE SERPL-MCNC: 131 MG/DL — HIGH (ref 70–99)
GLUCOSE SERPL-MCNC: 134 MG/DL — HIGH (ref 70–99)
GLUCOSE SERPL-MCNC: 134 MG/DL — HIGH (ref 70–99)
GLUCOSE SERPL-MCNC: 145 MG/DL — HIGH (ref 70–99)
GLUCOSE SERPL-MCNC: 150 MG/DL — HIGH (ref 70–99)
GLUCOSE SERPL-MCNC: 150 MG/DL — HIGH (ref 70–99)
GLUCOSE SERPL-MCNC: 156 MG/DL — HIGH (ref 70–99)
GLUCOSE SERPL-MCNC: 161 MG/DL — HIGH (ref 70–99)
GLUCOSE SERPL-MCNC: 221 MG/DL — HIGH (ref 70–99)
GLUCOSE SERPL-MCNC: 221 MG/DL — HIGH (ref 70–99)
GLUCOSE SERPL-MCNC: 92 MG/DL — SIGNIFICANT CHANGE UP (ref 70–99)
GLUCOSE SERPL-MCNC: 92 MG/DL — SIGNIFICANT CHANGE UP (ref 70–99)
GLUCOSE SERPL-MCNC: 93 MG/DL — SIGNIFICANT CHANGE UP (ref 70–99)
GLUCOSE SERPL-MCNC: 93 MG/DL — SIGNIFICANT CHANGE UP (ref 70–99)
GLUCOSE SERPL-MCNC: 96 MG/DL — SIGNIFICANT CHANGE UP (ref 70–99)
GLUCOSE SERPL-MCNC: 96 MG/DL — SIGNIFICANT CHANGE UP (ref 70–99)
GLUCOSE UR QL: NEGATIVE MG/DL — SIGNIFICANT CHANGE UP
GLUCOSE UR QL: NEGATIVE MG/DL — SIGNIFICANT CHANGE UP
GRAM STN FLD: SIGNIFICANT CHANGE UP
HADV DNA SPEC QL NAA+PROBE: DETECTED
HCO3 BLDA-SCNC: 31 MMOL/L — HIGH (ref 21–28)
HCT VFR BLD CALC: 28.3 % — LOW (ref 39–50)
HCT VFR BLD CALC: 28.3 % — LOW (ref 39–50)
HCT VFR BLD CALC: 28.6 % — LOW (ref 39–50)
HCT VFR BLD CALC: 28.6 % — LOW (ref 39–50)
HCT VFR BLD CALC: 28.7 % — LOW (ref 39–50)
HCT VFR BLD CALC: 28.8 % — LOW (ref 39–50)
HCT VFR BLD CALC: 29 % — LOW (ref 39–50)
HCT VFR BLD CALC: 29.1 % — LOW (ref 39–50)
HCT VFR BLD CALC: 29.1 % — LOW (ref 39–50)
HCT VFR BLD CALC: 29.3 % — LOW (ref 39–50)
HCT VFR BLD CALC: 29.3 % — LOW (ref 39–50)
HCT VFR BLD CALC: 31.1 % — LOW (ref 39–50)
HCT VFR BLD CALC: 31.1 % — LOW (ref 39–50)
HCT VFR BLD CALC: 31.2 % — LOW (ref 39–50)
HCT VFR BLD CALC: 31.2 % — LOW (ref 39–50)
HCT VFR BLD CALC: 31.5 % — LOW (ref 39–50)
HCT VFR BLD CALC: 31.5 % — LOW (ref 39–50)
HCT VFR BLD CALC: 32.1 % — LOW (ref 39–50)
HCT VFR BLD CALC: 32.1 % — LOW (ref 39–50)
HCT VFR BLD CALC: 33.4 % — LOW (ref 39–50)
HCT VFR BLD CALC: 33.4 % — LOW (ref 39–50)
HCT VFR BLD CALC: 33.6 % — LOW (ref 39–50)
HCT VFR BLD CALC: 33.6 % — LOW (ref 39–50)
HCT VFR BLD CALC: 33.7 % — LOW (ref 39–50)
HCT VFR BLD CALC: 33.9 % — LOW (ref 39–50)
HCT VFR BLD CALC: 33.9 % — LOW (ref 39–50)
HCT VFR BLD CALC: 34 % — LOW (ref 39–50)
HCT VFR BLD CALC: 34.5 % — LOW (ref 39–50)
HCT VFR BLD CALC: 35.6 % — LOW (ref 39–50)
HCT VFR BLD CALC: 35.8 % — LOW (ref 39–50)
HCT VFR BLD CALC: 36.3 % — LOW (ref 39–50)
HCT VFR BLD CALC: 37.6 % — LOW (ref 39–50)
HCT VFR BLD CALC: 38.2 % — LOW (ref 39–50)
HCT VFR BLD CALC: 39.2 % — SIGNIFICANT CHANGE UP (ref 39–50)
HCT VFR BLD CALC: 40.9 % — SIGNIFICANT CHANGE UP (ref 39–50)
HDLC SERPL-MCNC: 56 MG/DL — SIGNIFICANT CHANGE UP
HGB BLD-MCNC: 10 G/DL — LOW (ref 13–17)
HGB BLD-MCNC: 10 G/DL — LOW (ref 13–17)
HGB BLD-MCNC: 10.2 G/DL — LOW (ref 13–17)
HGB BLD-MCNC: 10.2 G/DL — LOW (ref 13–17)
HGB BLD-MCNC: 10.3 G/DL — LOW (ref 13–17)
HGB BLD-MCNC: 10.3 G/DL — LOW (ref 13–17)
HGB BLD-MCNC: 10.5 G/DL — LOW (ref 13–17)
HGB BLD-MCNC: 10.5 G/DL — LOW (ref 13–17)
HGB BLD-MCNC: 10.6 G/DL — LOW (ref 13–17)
HGB BLD-MCNC: 10.6 G/DL — LOW (ref 13–17)
HGB BLD-MCNC: 10.7 G/DL — LOW (ref 13–17)
HGB BLD-MCNC: 11.3 G/DL — LOW (ref 13–17)
HGB BLD-MCNC: 11.4 G/DL — LOW (ref 13–17)
HGB BLD-MCNC: 11.4 G/DL — LOW (ref 13–17)
HGB BLD-MCNC: 11.8 G/DL — LOW (ref 13–17)
HGB BLD-MCNC: 12.1 G/DL — LOW (ref 13–17)
HGB BLD-MCNC: 12.3 G/DL — LOW (ref 13–17)
HGB BLD-MCNC: 12.6 G/DL — LOW (ref 13–17)
HGB BLD-MCNC: 12.7 G/DL — LOW (ref 13–17)
HGB BLD-MCNC: 13.2 G/DL — SIGNIFICANT CHANGE UP (ref 13–17)
HGB BLD-MCNC: 8.9 G/DL — LOW (ref 13–17)
HGB BLD-MCNC: 8.9 G/DL — LOW (ref 13–17)
HGB BLD-MCNC: 9 G/DL — LOW (ref 13–17)
HGB BLD-MCNC: 9.1 G/DL — LOW (ref 13–17)
HGB BLD-MCNC: 9.2 G/DL — LOW (ref 13–17)
HGB BLD-MCNC: 9.4 G/DL — LOW (ref 13–17)
HGB BLD-MCNC: 9.6 G/DL — LOW (ref 13–17)
HGB BLD-MCNC: 9.6 G/DL — LOW (ref 13–17)
HGB BLD-MCNC: 9.9 G/DL — LOW (ref 13–17)
HGB BLD-MCNC: 9.9 G/DL — LOW (ref 13–17)
HOROWITZ INDEX BLDA+IHG-RTO: 21 — SIGNIFICANT CHANGE UP
IMM GRANULOCYTES NFR BLD AUTO: 0.4 % — SIGNIFICANT CHANGE UP (ref 0–0.9)
IMM GRANULOCYTES NFR BLD AUTO: 0.5 % — SIGNIFICANT CHANGE UP (ref 0–0.9)
IMM GRANULOCYTES NFR BLD AUTO: 0.7 % — SIGNIFICANT CHANGE UP (ref 0–0.9)
IMM GRANULOCYTES NFR BLD AUTO: 0.9 % — SIGNIFICANT CHANGE UP (ref 0–0.9)
IMM GRANULOCYTES NFR BLD AUTO: 6.4 % — HIGH (ref 0–0.9)
INR BLD: 1.07 RATIO — SIGNIFICANT CHANGE UP (ref 0.85–1.18)
INR BLD: 1.66 RATIO — HIGH (ref 0.85–1.18)
INR BLD: 1.66 RATIO — HIGH (ref 0.85–1.18)
INR BLD: 2.12 RATIO — HIGH (ref 0.85–1.18)
INR BLD: 2.12 RATIO — HIGH (ref 0.85–1.18)
KETONES UR-MCNC: NEGATIVE MG/DL — SIGNIFICANT CHANGE UP
KETONES UR-MCNC: NEGATIVE MG/DL — SIGNIFICANT CHANGE UP
LDH SERPL L TO P-CCNC: 126 U/L — SIGNIFICANT CHANGE UP
LDH SERPL L TO P-CCNC: 126 U/L — SIGNIFICANT CHANGE UP
LDH SERPL L TO P-CCNC: 191 U/L — SIGNIFICANT CHANGE UP
LDH SERPL L TO P-CCNC: 191 U/L — SIGNIFICANT CHANGE UP
LDH SERPL L TO P-CCNC: 215 U/L — SIGNIFICANT CHANGE UP (ref 50–242)
LDH SERPL L TO P-CCNC: 215 U/L — SIGNIFICANT CHANGE UP (ref 50–242)
LDH SERPL L TO P-CCNC: 274 U/L — HIGH (ref 50–242)
LDH SERPL L TO P-CCNC: 274 U/L — HIGH (ref 50–242)
LEUKOCYTE ESTERASE UR-ACNC: NEGATIVE — SIGNIFICANT CHANGE UP
LEUKOCYTE ESTERASE UR-ACNC: NEGATIVE — SIGNIFICANT CHANGE UP
LIPID PNL WITH DIRECT LDL SERPL: 61 MG/DL — SIGNIFICANT CHANGE UP
LYMPHOCYTES # BLD AUTO: 1.2 K/UL — SIGNIFICANT CHANGE UP (ref 1–3.3)
LYMPHOCYTES # BLD AUTO: 1.2 K/UL — SIGNIFICANT CHANGE UP (ref 1–3.3)
LYMPHOCYTES # BLD AUTO: 1.21 K/UL — SIGNIFICANT CHANGE UP (ref 1–3.3)
LYMPHOCYTES # BLD AUTO: 1.23 K/UL — SIGNIFICANT CHANGE UP (ref 1–3.3)
LYMPHOCYTES # BLD AUTO: 1.23 K/UL — SIGNIFICANT CHANGE UP (ref 1–3.3)
LYMPHOCYTES # BLD AUTO: 1.44 K/UL — SIGNIFICANT CHANGE UP (ref 1–3.3)
LYMPHOCYTES # BLD AUTO: 1.79 K/UL — SIGNIFICANT CHANGE UP (ref 1–3.3)
LYMPHOCYTES # BLD AUTO: 1.79 K/UL — SIGNIFICANT CHANGE UP (ref 1–3.3)
LYMPHOCYTES # BLD AUTO: 14.3 % — SIGNIFICANT CHANGE UP (ref 13–44)
LYMPHOCYTES # BLD AUTO: 14.3 % — SIGNIFICANT CHANGE UP (ref 13–44)
LYMPHOCYTES # BLD AUTO: 15.6 % — SIGNIFICANT CHANGE UP (ref 13–44)
LYMPHOCYTES # BLD AUTO: 16.1 % — SIGNIFICANT CHANGE UP (ref 13–44)
LYMPHOCYTES # BLD AUTO: 16.1 % — SIGNIFICANT CHANGE UP (ref 13–44)
LYMPHOCYTES # BLD AUTO: 17.3 % — SIGNIFICANT CHANGE UP (ref 13–44)
LYMPHOCYTES # BLD AUTO: 18.2 % — SIGNIFICANT CHANGE UP (ref 13–44)
LYMPHOCYTES # BLD AUTO: 18.2 % — SIGNIFICANT CHANGE UP (ref 13–44)
LYMPHOCYTES # BLD AUTO: 2.45 K/UL — SIGNIFICANT CHANGE UP (ref 1–3.3)
LYMPHOCYTES # BLD AUTO: 20.1 % — SIGNIFICANT CHANGE UP (ref 13–44)
LYMPHOCYTES # BLD AUTO: 20.1 % — SIGNIFICANT CHANGE UP (ref 13–44)
LYMPHOCYTES # BLD AUTO: 9.7 % — LOW (ref 13–44)
LYMPHOCYTES # FLD: 33 % — SIGNIFICANT CHANGE UP
LYMPHOCYTES # FLD: 33 % — SIGNIFICANT CHANGE UP
LYMPHOCYTES # FLD: 49 % — SIGNIFICANT CHANGE UP
LYMPHOCYTES # FLD: 49 % — SIGNIFICANT CHANGE UP
MAGNESIUM SERPL-MCNC: 2.1 MG/DL — SIGNIFICANT CHANGE UP (ref 1.6–2.6)
MAGNESIUM SERPL-MCNC: 2.1 MG/DL — SIGNIFICANT CHANGE UP (ref 1.6–2.6)
MAGNESIUM SERPL-MCNC: 2.2 MG/DL — SIGNIFICANT CHANGE UP (ref 1.6–2.6)
MAGNESIUM SERPL-MCNC: 2.3 MG/DL — SIGNIFICANT CHANGE UP (ref 1.6–2.6)
MAGNESIUM SERPL-MCNC: 2.4 MG/DL — SIGNIFICANT CHANGE UP (ref 1.6–2.6)
MAGNESIUM SERPL-MCNC: 2.7 MG/DL — HIGH (ref 1.6–2.6)
MAGNESIUM SERPL-MCNC: 2.7 MG/DL — HIGH (ref 1.6–2.6)
MAGNESIUM SERPL-MCNC: 3.1 MG/DL — HIGH (ref 1.6–2.6)
MCHC RBC-ENTMCNC: 30.6 GM/DL — LOW (ref 32–36)
MCHC RBC-ENTMCNC: 30.6 GM/DL — LOW (ref 32–36)
MCHC RBC-ENTMCNC: 30.9 GM/DL — LOW (ref 32–36)
MCHC RBC-ENTMCNC: 31.3 GM/DL — LOW (ref 32–36)
MCHC RBC-ENTMCNC: 31.4 GM/DL — LOW (ref 32–36)
MCHC RBC-ENTMCNC: 31.6 GM/DL — LOW (ref 32–36)
MCHC RBC-ENTMCNC: 31.7 GM/DL — LOW (ref 32–36)
MCHC RBC-ENTMCNC: 31.7 GM/DL — LOW (ref 32–36)
MCHC RBC-ENTMCNC: 31.8 GM/DL — LOW (ref 32–36)
MCHC RBC-ENTMCNC: 31.9 GM/DL — LOW (ref 32–36)
MCHC RBC-ENTMCNC: 31.9 GM/DL — LOW (ref 32–36)
MCHC RBC-ENTMCNC: 31.9 PG — SIGNIFICANT CHANGE UP (ref 27–34)
MCHC RBC-ENTMCNC: 31.9 PG — SIGNIFICANT CHANGE UP (ref 27–34)
MCHC RBC-ENTMCNC: 32 GM/DL — SIGNIFICANT CHANGE UP (ref 32–36)
MCHC RBC-ENTMCNC: 32 PG — SIGNIFICANT CHANGE UP (ref 27–34)
MCHC RBC-ENTMCNC: 32.1 GM/DL — SIGNIFICANT CHANGE UP (ref 32–36)
MCHC RBC-ENTMCNC: 32.1 PG — SIGNIFICANT CHANGE UP (ref 27–34)
MCHC RBC-ENTMCNC: 32.1 PG — SIGNIFICANT CHANGE UP (ref 27–34)
MCHC RBC-ENTMCNC: 32.2 PG — SIGNIFICANT CHANGE UP (ref 27–34)
MCHC RBC-ENTMCNC: 32.3 GM/DL — SIGNIFICANT CHANGE UP (ref 32–36)
MCHC RBC-ENTMCNC: 32.3 PG — SIGNIFICANT CHANGE UP (ref 27–34)
MCHC RBC-ENTMCNC: 32.4 GM/DL — SIGNIFICANT CHANGE UP (ref 32–36)
MCHC RBC-ENTMCNC: 32.4 PG — SIGNIFICANT CHANGE UP (ref 27–34)
MCHC RBC-ENTMCNC: 32.5 PG — SIGNIFICANT CHANGE UP (ref 27–34)
MCHC RBC-ENTMCNC: 32.6 PG — SIGNIFICANT CHANGE UP (ref 27–34)
MCHC RBC-ENTMCNC: 32.7 GM/DL — SIGNIFICANT CHANGE UP (ref 32–36)
MCHC RBC-ENTMCNC: 32.7 PG — SIGNIFICANT CHANGE UP (ref 27–34)
MCHC RBC-ENTMCNC: 32.8 GM/DL — SIGNIFICANT CHANGE UP (ref 32–36)
MCHC RBC-ENTMCNC: 32.8 PG — SIGNIFICANT CHANGE UP (ref 27–34)
MCHC RBC-ENTMCNC: 32.9 PG — SIGNIFICANT CHANGE UP (ref 27–34)
MCHC RBC-ENTMCNC: 33 GM/DL — SIGNIFICANT CHANGE UP (ref 32–36)
MCHC RBC-ENTMCNC: 33 GM/DL — SIGNIFICANT CHANGE UP (ref 32–36)
MCHC RBC-ENTMCNC: 33 PG — SIGNIFICANT CHANGE UP (ref 27–34)
MCHC RBC-ENTMCNC: 33 PG — SIGNIFICANT CHANGE UP (ref 27–34)
MCHC RBC-ENTMCNC: 33.1 PG — SIGNIFICANT CHANGE UP (ref 27–34)
MCHC RBC-ENTMCNC: 33.3 GM/DL — SIGNIFICANT CHANGE UP (ref 32–36)
MCHC RBC-ENTMCNC: 33.5 GM/DL — SIGNIFICANT CHANGE UP (ref 32–36)
MCV RBC AUTO: 100.3 FL — HIGH (ref 80–100)
MCV RBC AUTO: 100.3 FL — HIGH (ref 80–100)
MCV RBC AUTO: 100.6 FL — HIGH (ref 80–100)
MCV RBC AUTO: 100.6 FL — HIGH (ref 80–100)
MCV RBC AUTO: 100.7 FL — HIGH (ref 80–100)
MCV RBC AUTO: 100.7 FL — HIGH (ref 80–100)
MCV RBC AUTO: 100.9 FL — HIGH (ref 80–100)
MCV RBC AUTO: 100.9 FL — HIGH (ref 80–100)
MCV RBC AUTO: 101 FL — HIGH (ref 80–100)
MCV RBC AUTO: 101.1 FL — HIGH (ref 80–100)
MCV RBC AUTO: 101.4 FL — HIGH (ref 80–100)
MCV RBC AUTO: 101.4 FL — HIGH (ref 80–100)
MCV RBC AUTO: 101.8 FL — HIGH (ref 80–100)
MCV RBC AUTO: 102.1 FL — HIGH (ref 80–100)
MCV RBC AUTO: 102.4 FL — HIGH (ref 80–100)
MCV RBC AUTO: 102.4 FL — HIGH (ref 80–100)
MCV RBC AUTO: 102.5 FL — HIGH (ref 80–100)
MCV RBC AUTO: 102.5 FL — HIGH (ref 80–100)
MCV RBC AUTO: 102.8 FL — HIGH (ref 80–100)
MCV RBC AUTO: 102.8 FL — HIGH (ref 80–100)
MCV RBC AUTO: 102.9 FL — HIGH (ref 80–100)
MCV RBC AUTO: 102.9 FL — HIGH (ref 80–100)
MCV RBC AUTO: 103.3 FL — HIGH (ref 80–100)
MCV RBC AUTO: 103.3 FL — HIGH (ref 80–100)
MCV RBC AUTO: 103.6 FL — HIGH (ref 80–100)
MCV RBC AUTO: 103.6 FL — HIGH (ref 80–100)
MCV RBC AUTO: 103.7 FL — HIGH (ref 80–100)
MCV RBC AUTO: 103.7 FL — HIGH (ref 80–100)
MCV RBC AUTO: 105 FL — HIGH (ref 80–100)
MCV RBC AUTO: 105 FL — HIGH (ref 80–100)
MCV RBC AUTO: 105.1 FL — HIGH (ref 80–100)
MCV RBC AUTO: 105.1 FL — HIGH (ref 80–100)
MCV RBC AUTO: 95.5 FL — SIGNIFICANT CHANGE UP (ref 80–100)
MCV RBC AUTO: 98.9 FL — SIGNIFICANT CHANGE UP (ref 80–100)
MCV RBC AUTO: 99.2 FL — SIGNIFICANT CHANGE UP (ref 80–100)
MCV RBC AUTO: 99.2 FL — SIGNIFICANT CHANGE UP (ref 80–100)
MCV RBC AUTO: 99.7 FL — SIGNIFICANT CHANGE UP (ref 80–100)
MESOTHL CELL # FLD: 1 % — SIGNIFICANT CHANGE UP
MONOCYTES # BLD AUTO: 1.06 K/UL — HIGH (ref 0–0.9)
MONOCYTES # BLD AUTO: 1.06 K/UL — HIGH (ref 0–0.9)
MONOCYTES # BLD AUTO: 1.08 K/UL — HIGH (ref 0–0.9)
MONOCYTES # BLD AUTO: 1.08 K/UL — HIGH (ref 0–0.9)
MONOCYTES # BLD AUTO: 1.22 K/UL — HIGH (ref 0–0.9)
MONOCYTES # BLD AUTO: 1.22 K/UL — HIGH (ref 0–0.9)
MONOCYTES # BLD AUTO: 1.37 K/UL — HIGH (ref 0–0.9)
MONOCYTES # BLD AUTO: 1.47 K/UL — HIGH (ref 0–0.9)
MONOCYTES # BLD AUTO: 1.47 K/UL — HIGH (ref 0–0.9)
MONOCYTES # BLD AUTO: 1.62 K/UL — HIGH (ref 0–0.9)
MONOCYTES # BLD AUTO: 1.87 K/UL — HIGH (ref 0–0.9)
MONOCYTES NFR BLD AUTO: 13 % — SIGNIFICANT CHANGE UP (ref 2–14)
MONOCYTES NFR BLD AUTO: 13.2 % — SIGNIFICANT CHANGE UP (ref 2–14)
MONOCYTES NFR BLD AUTO: 13.7 % — SIGNIFICANT CHANGE UP (ref 2–14)
MONOCYTES NFR BLD AUTO: 13.7 % — SIGNIFICANT CHANGE UP (ref 2–14)
MONOCYTES NFR BLD AUTO: 13.8 % — SIGNIFICANT CHANGE UP (ref 2–14)
MONOCYTES NFR BLD AUTO: 13.8 % — SIGNIFICANT CHANGE UP (ref 2–14)
MONOCYTES NFR BLD AUTO: 14.5 % — HIGH (ref 2–14)
MONOCYTES NFR BLD AUTO: 14.5 % — HIGH (ref 2–14)
MONOCYTES NFR BLD AUTO: 14.9 % — HIGH (ref 2–14)
MONOCYTES NFR BLD AUTO: 16.5 % — HIGH (ref 2–14)
MONOCYTES NFR BLD AUTO: 16.5 % — HIGH (ref 2–14)
MONOS+MACROS # FLD: 35 % — SIGNIFICANT CHANGE UP
MONOS+MACROS # FLD: 35 % — SIGNIFICANT CHANGE UP
MONOS+MACROS # FLD: 39 % — SIGNIFICANT CHANGE UP
MONOS+MACROS # FLD: 39 % — SIGNIFICANT CHANGE UP
NEUTROPHILS # BLD AUTO: 4.9 K/UL — SIGNIFICANT CHANGE UP (ref 1.8–7.4)
NEUTROPHILS # BLD AUTO: 4.9 K/UL — SIGNIFICANT CHANGE UP (ref 1.8–7.4)
NEUTROPHILS # BLD AUTO: 5.03 K/UL — SIGNIFICANT CHANGE UP (ref 1.8–7.4)
NEUTROPHILS # BLD AUTO: 5.03 K/UL — SIGNIFICANT CHANGE UP (ref 1.8–7.4)
NEUTROPHILS # BLD AUTO: 5.18 K/UL — SIGNIFICANT CHANGE UP (ref 1.8–7.4)
NEUTROPHILS # BLD AUTO: 5.18 K/UL — SIGNIFICANT CHANGE UP (ref 1.8–7.4)
NEUTROPHILS # BLD AUTO: 5.67 K/UL — SIGNIFICANT CHANGE UP (ref 1.8–7.4)
NEUTROPHILS # BLD AUTO: 5.67 K/UL — SIGNIFICANT CHANGE UP (ref 1.8–7.4)
NEUTROPHILS # BLD AUTO: 6.07 K/UL — SIGNIFICANT CHANGE UP (ref 1.8–7.4)
NEUTROPHILS # BLD AUTO: 8.76 K/UL — HIGH (ref 1.8–7.4)
NEUTROPHILS # BLD AUTO: 9.31 K/UL — HIGH (ref 1.8–7.4)
NEUTROPHILS NFR BLD AUTO: 58.4 % — SIGNIFICANT CHANGE UP (ref 43–77)
NEUTROPHILS NFR BLD AUTO: 58.4 % — SIGNIFICANT CHANGE UP (ref 43–77)
NEUTROPHILS NFR BLD AUTO: 62.1 % — SIGNIFICANT CHANGE UP (ref 43–77)
NEUTROPHILS NFR BLD AUTO: 63.6 % — SIGNIFICANT CHANGE UP (ref 43–77)
NEUTROPHILS NFR BLD AUTO: 63.6 % — SIGNIFICANT CHANGE UP (ref 43–77)
NEUTROPHILS NFR BLD AUTO: 64 % — SIGNIFICANT CHANGE UP (ref 43–77)
NEUTROPHILS NFR BLD AUTO: 64 % — SIGNIFICANT CHANGE UP (ref 43–77)
NEUTROPHILS NFR BLD AUTO: 65.8 % — SIGNIFICANT CHANGE UP (ref 43–77)
NEUTROPHILS NFR BLD AUTO: 67.4 % — SIGNIFICANT CHANGE UP (ref 43–77)
NEUTROPHILS NFR BLD AUTO: 67.4 % — SIGNIFICANT CHANGE UP (ref 43–77)
NEUTROPHILS NFR BLD AUTO: 74.7 % — SIGNIFICANT CHANGE UP (ref 43–77)
NEUTROPHILS-BODY FLUID: 11 % — SIGNIFICANT CHANGE UP
NEUTROPHILS-BODY FLUID: 11 % — SIGNIFICANT CHANGE UP
NEUTROPHILS-BODY FLUID: 28 % — SIGNIFICANT CHANGE UP
NEUTROPHILS-BODY FLUID: 28 % — SIGNIFICANT CHANGE UP
NITRITE UR-MCNC: NEGATIVE — SIGNIFICANT CHANGE UP
NITRITE UR-MCNC: NEGATIVE — SIGNIFICANT CHANGE UP
NON HDL CHOLESTEROL: 73 MG/DL — SIGNIFICANT CHANGE UP
NON-GYNECOLOGICAL CYTOLOGY STUDY: SIGNIFICANT CHANGE UP
NRBC # BLD: 0 /100 WBCS — SIGNIFICANT CHANGE UP (ref 0–0)
NRBC # FLD: 0 % — SIGNIFICANT CHANGE UP
NRBC # FLD: 0 % — SIGNIFICANT CHANGE UP
NT-PROBNP SERPL-SCNC: 2100 PG/ML — HIGH (ref 0–300)
NT-PROBNP SERPL-SCNC: 2604 PG/ML — HIGH (ref 0–300)
NT-PROBNP SERPL-SCNC: 2604 PG/ML — HIGH (ref 0–300)
NT-PROBNP SERPL-SCNC: 2833 PG/ML — HIGH (ref 0–300)
NT-PROBNP SERPL-SCNC: 2902 PG/ML — HIGH (ref 0–300)
NT-PROBNP SERPL-SCNC: 3595 PG/ML — HIGH (ref 0–300)
NT-PROBNP SERPL-SCNC: 3595 PG/ML — HIGH (ref 0–300)
NT-PROBNP SERPL-SCNC: 3788 PG/ML — HIGH (ref 0–300)
NT-PROBNP SERPL-SCNC: 3788 PG/ML — HIGH (ref 0–300)
NT-PROBNP SERPL-SCNC: 3970 PG/ML — HIGH (ref 0–300)
NT-PROBNP SERPL-SCNC: 3970 PG/ML — HIGH (ref 0–300)
OTHER CELLS FLD MANUAL: 0 % — SIGNIFICANT CHANGE UP
OTHER CELLS FLD MANUAL: 0 % — SIGNIFICANT CHANGE UP
PCO2 BLDA: 44 MMHG — SIGNIFICANT CHANGE UP (ref 35–48)
PH BLDA: 7.46 — HIGH (ref 7.35–7.45)
PH FLD: 7.5 — SIGNIFICANT CHANGE UP
PH FLD: 7.5 — SIGNIFICANT CHANGE UP
PH FLD: 7.6 — SIGNIFICANT CHANGE UP
PH FLD: 7.6 — SIGNIFICANT CHANGE UP
PH UR: 6.5 — SIGNIFICANT CHANGE UP (ref 5–8)
PH UR: 6.5 — SIGNIFICANT CHANGE UP (ref 5–8)
PHOSPHATE SERPL-MCNC: 3.9 MG/DL — SIGNIFICANT CHANGE UP (ref 2.5–4.5)
PHOSPHATE SERPL-MCNC: 3.9 MG/DL — SIGNIFICANT CHANGE UP (ref 2.5–4.5)
PHOSPHATE SERPL-MCNC: 4 MG/DL — SIGNIFICANT CHANGE UP (ref 2.5–4.5)
PHOSPHATE SERPL-MCNC: 4.3 MG/DL — SIGNIFICANT CHANGE UP (ref 2.5–4.5)
PLATELET # BLD AUTO: 135 K/UL — LOW (ref 150–400)
PLATELET # BLD AUTO: 135 K/UL — LOW (ref 150–400)
PLATELET # BLD AUTO: 146 K/UL — LOW (ref 150–400)
PLATELET # BLD AUTO: 146 K/UL — LOW (ref 150–400)
PLATELET # BLD AUTO: 147 K/UL — LOW (ref 150–400)
PLATELET # BLD AUTO: 147 K/UL — LOW (ref 150–400)
PLATELET # BLD AUTO: 149 K/UL — LOW (ref 150–400)
PLATELET # BLD AUTO: 149 K/UL — LOW (ref 150–400)
PLATELET # BLD AUTO: 154 K/UL — SIGNIFICANT CHANGE UP (ref 150–400)
PLATELET # BLD AUTO: 154 K/UL — SIGNIFICANT CHANGE UP (ref 150–400)
PLATELET # BLD AUTO: 156 K/UL — SIGNIFICANT CHANGE UP (ref 150–400)
PLATELET # BLD AUTO: 156 K/UL — SIGNIFICANT CHANGE UP (ref 150–400)
PLATELET # BLD AUTO: 160 K/UL — SIGNIFICANT CHANGE UP (ref 150–400)
PLATELET # BLD AUTO: 161 K/UL — SIGNIFICANT CHANGE UP (ref 150–400)
PLATELET # BLD AUTO: 162 K/UL — SIGNIFICANT CHANGE UP (ref 150–400)
PLATELET # BLD AUTO: 162 K/UL — SIGNIFICANT CHANGE UP (ref 150–400)
PLATELET # BLD AUTO: 169 K/UL — SIGNIFICANT CHANGE UP (ref 150–400)
PLATELET # BLD AUTO: 169 K/UL — SIGNIFICANT CHANGE UP (ref 150–400)
PLATELET # BLD AUTO: 170 K/UL — SIGNIFICANT CHANGE UP (ref 150–400)
PLATELET # BLD AUTO: 171 K/UL — SIGNIFICANT CHANGE UP (ref 150–400)
PLATELET # BLD AUTO: 172 K/UL — SIGNIFICANT CHANGE UP (ref 150–400)
PLATELET # BLD AUTO: 172 K/UL — SIGNIFICANT CHANGE UP (ref 150–400)
PLATELET # BLD AUTO: 174 K/UL — SIGNIFICANT CHANGE UP (ref 150–400)
PLATELET # BLD AUTO: 175 K/UL — SIGNIFICANT CHANGE UP (ref 150–400)
PLATELET # BLD AUTO: 178 K/UL — SIGNIFICANT CHANGE UP (ref 150–400)
PLATELET # BLD AUTO: 178 K/UL — SIGNIFICANT CHANGE UP (ref 150–400)
PLATELET # BLD AUTO: 179 K/UL — SIGNIFICANT CHANGE UP (ref 150–400)
PLATELET # BLD AUTO: 179 K/UL — SIGNIFICANT CHANGE UP (ref 150–400)
PLATELET # BLD AUTO: 189 K/UL — SIGNIFICANT CHANGE UP (ref 150–400)
PLATELET # BLD AUTO: 196 K/UL — SIGNIFICANT CHANGE UP (ref 150–400)
PLATELET # BLD AUTO: 196 K/UL — SIGNIFICANT CHANGE UP (ref 150–400)
PLATELET # BLD AUTO: 205 K/UL — SIGNIFICANT CHANGE UP (ref 150–400)
PLATELET # BLD AUTO: 207 K/UL — SIGNIFICANT CHANGE UP (ref 150–400)
PLATELET # BLD AUTO: 222 K/UL — SIGNIFICANT CHANGE UP (ref 150–400)
PLATELET # BLD AUTO: 232 K/UL — SIGNIFICANT CHANGE UP (ref 150–400)
PLATELET # BLD AUTO: 270 K/UL — SIGNIFICANT CHANGE UP (ref 150–400)
PLATELET # BLD AUTO: 273 K/UL — SIGNIFICANT CHANGE UP (ref 150–400)
PO2 BLDA: 72 MMHG — LOW (ref 83–108)
POTASSIUM SERPL-MCNC: 3 MMOL/L — LOW (ref 3.5–5.3)
POTASSIUM SERPL-MCNC: 3.2 MMOL/L — LOW (ref 3.5–5.3)
POTASSIUM SERPL-MCNC: 3.2 MMOL/L — LOW (ref 3.5–5.3)
POTASSIUM SERPL-MCNC: 3.3 MMOL/L — LOW (ref 3.5–5.3)
POTASSIUM SERPL-MCNC: 3.3 MMOL/L — LOW (ref 3.5–5.3)
POTASSIUM SERPL-MCNC: 3.4 MMOL/L — LOW (ref 3.5–5.3)
POTASSIUM SERPL-MCNC: 3.5 MMOL/L — SIGNIFICANT CHANGE UP (ref 3.5–5.3)
POTASSIUM SERPL-MCNC: 3.5 MMOL/L — SIGNIFICANT CHANGE UP (ref 3.5–5.3)
POTASSIUM SERPL-MCNC: 3.6 MMOL/L — SIGNIFICANT CHANGE UP (ref 3.5–5.3)
POTASSIUM SERPL-MCNC: 3.7 MMOL/L — SIGNIFICANT CHANGE UP (ref 3.5–5.3)
POTASSIUM SERPL-MCNC: 3.8 MMOL/L — SIGNIFICANT CHANGE UP (ref 3.5–5.3)
POTASSIUM SERPL-MCNC: 3.9 MMOL/L — SIGNIFICANT CHANGE UP (ref 3.5–5.3)
POTASSIUM SERPL-MCNC: 4 MMOL/L — SIGNIFICANT CHANGE UP (ref 3.5–5.3)
POTASSIUM SERPL-MCNC: 4.1 MMOL/L — SIGNIFICANT CHANGE UP (ref 3.5–5.3)
POTASSIUM SERPL-MCNC: 4.2 MMOL/L — SIGNIFICANT CHANGE UP (ref 3.5–5.3)
POTASSIUM SERPL-MCNC: 4.3 MMOL/L — SIGNIFICANT CHANGE UP (ref 3.5–5.3)
POTASSIUM SERPL-MCNC: 4.5 MMOL/L — SIGNIFICANT CHANGE UP (ref 3.5–5.3)
POTASSIUM SERPL-SCNC: 3 MMOL/L — LOW (ref 3.5–5.3)
POTASSIUM SERPL-SCNC: 3.2 MMOL/L — LOW (ref 3.5–5.3)
POTASSIUM SERPL-SCNC: 3.2 MMOL/L — LOW (ref 3.5–5.3)
POTASSIUM SERPL-SCNC: 3.3 MMOL/L — LOW (ref 3.5–5.3)
POTASSIUM SERPL-SCNC: 3.3 MMOL/L — LOW (ref 3.5–5.3)
POTASSIUM SERPL-SCNC: 3.4 MMOL/L — LOW (ref 3.5–5.3)
POTASSIUM SERPL-SCNC: 3.5 MMOL/L — SIGNIFICANT CHANGE UP (ref 3.5–5.3)
POTASSIUM SERPL-SCNC: 3.5 MMOL/L — SIGNIFICANT CHANGE UP (ref 3.5–5.3)
POTASSIUM SERPL-SCNC: 3.6 MMOL/L — SIGNIFICANT CHANGE UP (ref 3.5–5.3)
POTASSIUM SERPL-SCNC: 3.7 MMOL/L — SIGNIFICANT CHANGE UP (ref 3.5–5.3)
POTASSIUM SERPL-SCNC: 3.8 MMOL/L — SIGNIFICANT CHANGE UP (ref 3.5–5.3)
POTASSIUM SERPL-SCNC: 3.9 MMOL/L — SIGNIFICANT CHANGE UP (ref 3.5–5.3)
POTASSIUM SERPL-SCNC: 4 MMOL/L — SIGNIFICANT CHANGE UP (ref 3.5–5.3)
POTASSIUM SERPL-SCNC: 4.1 MMOL/L — SIGNIFICANT CHANGE UP (ref 3.5–5.3)
POTASSIUM SERPL-SCNC: 4.2 MMOL/L — SIGNIFICANT CHANGE UP (ref 3.5–5.3)
POTASSIUM SERPL-SCNC: 4.3 MMOL/L — SIGNIFICANT CHANGE UP (ref 3.5–5.3)
POTASSIUM SERPL-SCNC: 4.5 MMOL/L — SIGNIFICANT CHANGE UP (ref 3.5–5.3)
PROT FLD-MCNC: 4.1 G/DL — SIGNIFICANT CHANGE UP
PROT FLD-MCNC: 4.1 G/DL — SIGNIFICANT CHANGE UP
PROT FLD-MCNC: 5.2 G/DL — SIGNIFICANT CHANGE UP
PROT FLD-MCNC: 5.2 G/DL — SIGNIFICANT CHANGE UP
PROT SERPL-MCNC: 7.1 G/DL — SIGNIFICANT CHANGE UP (ref 6–8.3)
PROT SERPL-MCNC: 7.2 G/DL — SIGNIFICANT CHANGE UP (ref 6–8.3)
PROT SERPL-MCNC: 7.3 G/DL — SIGNIFICANT CHANGE UP (ref 6–8.3)
PROT SERPL-MCNC: 7.5 G/DL — SIGNIFICANT CHANGE UP (ref 6–8.3)
PROT SERPL-MCNC: 7.5 G/DL — SIGNIFICANT CHANGE UP (ref 6–8.3)
PROT SERPL-MCNC: 7.6 G/DL — SIGNIFICANT CHANGE UP (ref 6–8.3)
PROT SERPL-MCNC: 7.7 G/DL — SIGNIFICANT CHANGE UP (ref 6–8.3)
PROT SERPL-MCNC: 7.7 G/DL — SIGNIFICANT CHANGE UP (ref 6–8.3)
PROT SERPL-MCNC: 7.9 G/DL — SIGNIFICANT CHANGE UP (ref 6–8.3)
PROT SERPL-MCNC: 8.2 G/DL — SIGNIFICANT CHANGE UP (ref 6–8.3)
PROT UR-MCNC: NEGATIVE MG/DL — SIGNIFICANT CHANGE UP
PROT UR-MCNC: NEGATIVE MG/DL — SIGNIFICANT CHANGE UP
PROTHROM AB SERPL-ACNC: 12.5 SEC — SIGNIFICANT CHANGE UP (ref 9.5–13)
PROTHROM AB SERPL-ACNC: 19.1 SEC — HIGH (ref 9.5–13)
PROTHROM AB SERPL-ACNC: 19.1 SEC — HIGH (ref 9.5–13)
PROTHROM AB SERPL-ACNC: 23.6 SEC — HIGH (ref 9.5–13)
PROTHROM AB SERPL-ACNC: 23.6 SEC — HIGH (ref 9.5–13)
RAPID RVP RESULT: DETECTED
RAPID RVP RESULT: SIGNIFICANT CHANGE UP
RAPID RVP RESULT: SIGNIFICANT CHANGE UP
RBC # BLD: 2.78 M/UL — LOW (ref 4.2–5.8)
RBC # BLD: 2.78 M/UL — LOW (ref 4.2–5.8)
RBC # BLD: 2.81 M/UL — LOW (ref 4.2–5.8)
RBC # BLD: 2.82 M/UL — LOW (ref 4.2–5.8)
RBC # BLD: 2.82 M/UL — LOW (ref 4.2–5.8)
RBC # BLD: 2.83 M/UL — LOW (ref 4.2–5.8)
RBC # BLD: 2.84 M/UL — LOW (ref 4.2–5.8)
RBC # BLD: 2.84 M/UL — LOW (ref 4.2–5.8)
RBC # BLD: 2.85 M/UL — LOW (ref 4.2–5.8)
RBC # BLD: 2.89 M/UL — LOW (ref 4.2–5.8)
RBC # BLD: 2.89 M/UL — LOW (ref 4.2–5.8)
RBC # BLD: 2.96 M/UL — LOW (ref 4.2–5.8)
RBC # BLD: 2.96 M/UL — LOW (ref 4.2–5.8)
RBC # BLD: 3.05 M/UL — LOW (ref 4.2–5.8)
RBC # BLD: 3.05 M/UL — LOW (ref 4.2–5.8)
RBC # BLD: 3.1 M/UL — LOW (ref 4.2–5.8)
RBC # BLD: 3.1 M/UL — LOW (ref 4.2–5.8)
RBC # BLD: 3.12 M/UL — LOW (ref 4.2–5.8)
RBC # BLD: 3.12 M/UL — LOW (ref 4.2–5.8)
RBC # BLD: 3.21 M/UL — LOW (ref 4.2–5.8)
RBC # BLD: 3.21 M/UL — LOW (ref 4.2–5.8)
RBC # BLD: 3.26 M/UL — LOW (ref 4.2–5.8)
RBC # BLD: 3.26 M/UL — LOW (ref 4.2–5.8)
RBC # BLD: 3.27 M/UL — LOW (ref 4.2–5.8)
RBC # BLD: 3.27 M/UL — LOW (ref 4.2–5.8)
RBC # BLD: 3.28 M/UL — LOW (ref 4.2–5.8)
RBC # BLD: 3.28 M/UL — LOW (ref 4.2–5.8)
RBC # BLD: 3.34 M/UL — LOW (ref 4.2–5.8)
RBC # BLD: 3.34 M/UL — LOW (ref 4.2–5.8)
RBC # BLD: 3.49 M/UL — LOW (ref 4.2–5.8)
RBC # BLD: 3.52 M/UL — LOW (ref 4.2–5.8)
RBC # BLD: 3.56 M/UL — LOW (ref 4.2–5.8)
RBC # BLD: 3.61 M/UL — LOW (ref 4.2–5.8)
RBC # BLD: 3.66 M/UL — LOW (ref 4.2–5.8)
RBC # BLD: 3.75 M/UL — LOW (ref 4.2–5.8)
RBC # BLD: 3.83 M/UL — LOW (ref 4.2–5.8)
RBC # BLD: 3.91 M/UL — LOW (ref 4.2–5.8)
RBC # BLD: 4.05 M/UL — LOW (ref 4.2–5.8)
RBC # FLD: 13.3 % — SIGNIFICANT CHANGE UP (ref 10.3–14.5)
RBC # FLD: 14 % — SIGNIFICANT CHANGE UP (ref 10.3–14.5)
RBC # FLD: 14.2 % — SIGNIFICANT CHANGE UP (ref 10.3–14.5)
RBC # FLD: 14.3 % — SIGNIFICANT CHANGE UP (ref 10.3–14.5)
RBC # FLD: 14.3 % — SIGNIFICANT CHANGE UP (ref 10.3–14.5)
RBC # FLD: 14.4 % — SIGNIFICANT CHANGE UP (ref 10.3–14.5)
RBC # FLD: 14.5 % — SIGNIFICANT CHANGE UP (ref 10.3–14.5)
RBC # FLD: 15.2 % — HIGH (ref 10.3–14.5)
RBC # FLD: 15.3 % — HIGH (ref 10.3–14.5)
RBC # FLD: 15.3 % — HIGH (ref 10.3–14.5)
RBC # FLD: 15.4 % — HIGH (ref 10.3–14.5)
RBC # FLD: 15.4 % — HIGH (ref 10.3–14.5)
RBC # FLD: 15.5 % — HIGH (ref 10.3–14.5)
RBC # FLD: 15.6 % — HIGH (ref 10.3–14.5)
RBC # FLD: 15.6 % — HIGH (ref 10.3–14.5)
RBC # FLD: 15.8 % — HIGH (ref 10.3–14.5)
RBC # FLD: 15.8 % — HIGH (ref 10.3–14.5)
RBC # FLD: 15.9 % — HIGH (ref 10.3–14.5)
RBC # FLD: 16 % — HIGH (ref 10.3–14.5)
RBC # FLD: 16.1 % — HIGH (ref 10.3–14.5)
RBC # FLD: 16.1 % — HIGH (ref 10.3–14.5)
RBC # FLD: 16.2 % — HIGH (ref 10.3–14.5)
RBC # FLD: 16.5 % — HIGH (ref 10.3–14.5)
RBC # FLD: 16.5 % — HIGH (ref 10.3–14.5)
RCV VOL RI: HIGH /UL (ref 0–0)
RV+EV RNA SPEC QL NAA+PROBE: DETECTED
SAO2 % BLDA: 95.4 % — SIGNIFICANT CHANGE UP (ref 94–98)
SARS-COV-2 RNA SPEC QL NAA+PROBE: SIGNIFICANT CHANGE UP
SODIUM SERPL-SCNC: 136 MMOL/L — SIGNIFICANT CHANGE UP (ref 135–145)
SODIUM SERPL-SCNC: 136 MMOL/L — SIGNIFICANT CHANGE UP (ref 135–145)
SODIUM SERPL-SCNC: 137 MMOL/L — SIGNIFICANT CHANGE UP (ref 135–145)
SODIUM SERPL-SCNC: 138 MMOL/L — SIGNIFICANT CHANGE UP (ref 135–145)
SODIUM SERPL-SCNC: 138 MMOL/L — SIGNIFICANT CHANGE UP (ref 135–145)
SODIUM SERPL-SCNC: 139 MMOL/L — SIGNIFICANT CHANGE UP (ref 135–145)
SODIUM SERPL-SCNC: 139 MMOL/L — SIGNIFICANT CHANGE UP (ref 135–145)
SODIUM SERPL-SCNC: 140 MMOL/L — SIGNIFICANT CHANGE UP (ref 135–145)
SODIUM SERPL-SCNC: 141 MMOL/L — SIGNIFICANT CHANGE UP (ref 135–145)
SODIUM SERPL-SCNC: 141 MMOL/L — SIGNIFICANT CHANGE UP (ref 135–145)
SODIUM SERPL-SCNC: 142 MMOL/L — SIGNIFICANT CHANGE UP (ref 135–145)
SODIUM SERPL-SCNC: 143 MMOL/L — SIGNIFICANT CHANGE UP (ref 135–145)
SODIUM SERPL-SCNC: 144 MMOL/L — SIGNIFICANT CHANGE UP (ref 135–145)
SODIUM SERPL-SCNC: 145 MMOL/L — SIGNIFICANT CHANGE UP (ref 135–145)
SODIUM SERPL-SCNC: 145 MMOL/L — SIGNIFICANT CHANGE UP (ref 135–145)
SODIUM SERPL-SCNC: 147 MMOL/L — HIGH (ref 135–145)
SODIUM SERPL-SCNC: 147 MMOL/L — HIGH (ref 135–145)
SP GR SPEC: 1.01 — SIGNIFICANT CHANGE UP (ref 1–1.03)
SP GR SPEC: 1.01 — SIGNIFICANT CHANGE UP (ref 1–1.03)
SPECIMEN SOURCE: SIGNIFICANT CHANGE UP
TOTAL NUCLEATED CELL COUNT, BODY FLUID: 1403 /UL — SIGNIFICANT CHANGE UP
TOTAL NUCLEATED CELL COUNT, BODY FLUID: 1403 /UL — SIGNIFICANT CHANGE UP
TOTAL NUCLEATED CELL COUNT, BODY FLUID: 920 /UL — SIGNIFICANT CHANGE UP
TOTAL NUCLEATED CELL COUNT, BODY FLUID: 920 /UL — SIGNIFICANT CHANGE UP
TRIGL SERPL-MCNC: 54 MG/DL — SIGNIFICANT CHANGE UP
TROPONIN I, HIGH SENSITIVITY RESULT: 24.5 NG/L — SIGNIFICANT CHANGE UP
TROPONIN I, HIGH SENSITIVITY RESULT: 29.6 NG/L — SIGNIFICANT CHANGE UP
TROPONIN I, HIGH SENSITIVITY RESULT: 33.8 NG/L — SIGNIFICANT CHANGE UP
TROPONIN I, HIGH SENSITIVITY RESULT: 35.9 NG/L — SIGNIFICANT CHANGE UP
TROPONIN I, HIGH SENSITIVITY RESULT: 38.2 NG/L — SIGNIFICANT CHANGE UP
TROPONIN I, HIGH SENSITIVITY RESULT: 47 NG/L — SIGNIFICANT CHANGE UP
TROPONIN I, HIGH SENSITIVITY RESULT: 47 NG/L — SIGNIFICANT CHANGE UP
TROPONIN I, HIGH SENSITIVITY RESULT: 58.8 NG/L — SIGNIFICANT CHANGE UP
TROPONIN I, HIGH SENSITIVITY RESULT: 58.8 NG/L — SIGNIFICANT CHANGE UP
TSH SERPL-MCNC: 0.94 UIU/ML — SIGNIFICANT CHANGE UP (ref 0.36–3.74)
TSH SERPL-MCNC: 1.19 UIU/ML — SIGNIFICANT CHANGE UP (ref 0.36–3.74)
TSH SERPL-MCNC: 1.19 UIU/ML — SIGNIFICANT CHANGE UP (ref 0.36–3.74)
TUBE TYPE: SIGNIFICANT CHANGE UP
UROBILINOGEN FLD QL: 0.2 MG/DL — SIGNIFICANT CHANGE UP (ref 0.2–1)
UROBILINOGEN FLD QL: 0.2 MG/DL — SIGNIFICANT CHANGE UP (ref 0.2–1)
VIT B12 SERPL-MCNC: 582 PG/ML — SIGNIFICANT CHANGE UP (ref 232–1245)
VIT B12 SERPL-MCNC: 582 PG/ML — SIGNIFICANT CHANGE UP (ref 232–1245)
VIT B12 SERPL-MCNC: 697 PG/ML — SIGNIFICANT CHANGE UP (ref 232–1245)
VIT B12 SERPL-MCNC: 697 PG/ML — SIGNIFICANT CHANGE UP (ref 232–1245)
WBC # BLD: 10.09 K/UL — SIGNIFICANT CHANGE UP (ref 3.8–10.5)
WBC # BLD: 10.09 K/UL — SIGNIFICANT CHANGE UP (ref 3.8–10.5)
WBC # BLD: 11.29 K/UL — HIGH (ref 3.8–10.5)
WBC # BLD: 12.13 K/UL — HIGH (ref 3.8–10.5)
WBC # BLD: 12.46 K/UL — HIGH (ref 3.8–10.5)
WBC # BLD: 14.13 K/UL — HIGH (ref 3.8–10.5)
WBC # BLD: 6.9 K/UL — SIGNIFICANT CHANGE UP (ref 3.8–10.5)
WBC # BLD: 6.9 K/UL — SIGNIFICANT CHANGE UP (ref 3.8–10.5)
WBC # BLD: 7.08 K/UL — SIGNIFICANT CHANGE UP (ref 3.8–10.5)
WBC # BLD: 7.08 K/UL — SIGNIFICANT CHANGE UP (ref 3.8–10.5)
WBC # BLD: 7.25 K/UL — SIGNIFICANT CHANGE UP (ref 3.8–10.5)
WBC # BLD: 7.25 K/UL — SIGNIFICANT CHANGE UP (ref 3.8–10.5)
WBC # BLD: 7.38 K/UL — SIGNIFICANT CHANGE UP (ref 3.8–10.5)
WBC # BLD: 7.38 K/UL — SIGNIFICANT CHANGE UP (ref 3.8–10.5)
WBC # BLD: 7.56 K/UL — SIGNIFICANT CHANGE UP (ref 3.8–10.5)
WBC # BLD: 7.56 K/UL — SIGNIFICANT CHANGE UP (ref 3.8–10.5)
WBC # BLD: 7.63 K/UL — SIGNIFICANT CHANGE UP (ref 3.8–10.5)
WBC # BLD: 7.63 K/UL — SIGNIFICANT CHANGE UP (ref 3.8–10.5)
WBC # BLD: 7.66 K/UL — SIGNIFICANT CHANGE UP (ref 3.8–10.5)
WBC # BLD: 7.66 K/UL — SIGNIFICANT CHANGE UP (ref 3.8–10.5)
WBC # BLD: 7.67 K/UL — SIGNIFICANT CHANGE UP (ref 3.8–10.5)
WBC # BLD: 7.67 K/UL — SIGNIFICANT CHANGE UP (ref 3.8–10.5)
WBC # BLD: 7.81 K/UL — SIGNIFICANT CHANGE UP (ref 3.8–10.5)
WBC # BLD: 7.86 K/UL — SIGNIFICANT CHANGE UP (ref 3.8–10.5)
WBC # BLD: 7.86 K/UL — SIGNIFICANT CHANGE UP (ref 3.8–10.5)
WBC # BLD: 7.9 K/UL — SIGNIFICANT CHANGE UP (ref 3.8–10.5)
WBC # BLD: 7.9 K/UL — SIGNIFICANT CHANGE UP (ref 3.8–10.5)
WBC # BLD: 8.12 K/UL — SIGNIFICANT CHANGE UP (ref 3.8–10.5)
WBC # BLD: 8.12 K/UL — SIGNIFICANT CHANGE UP (ref 3.8–10.5)
WBC # BLD: 8.25 K/UL — SIGNIFICANT CHANGE UP (ref 3.8–10.5)
WBC # BLD: 8.25 K/UL — SIGNIFICANT CHANGE UP (ref 3.8–10.5)
WBC # BLD: 8.41 K/UL — SIGNIFICANT CHANGE UP (ref 3.8–10.5)
WBC # BLD: 8.41 K/UL — SIGNIFICANT CHANGE UP (ref 3.8–10.5)
WBC # BLD: 8.6 K/UL — SIGNIFICANT CHANGE UP (ref 3.8–10.5)
WBC # BLD: 8.6 K/UL — SIGNIFICANT CHANGE UP (ref 3.8–10.5)
WBC # BLD: 8.76 K/UL — SIGNIFICANT CHANGE UP (ref 3.8–10.5)
WBC # BLD: 8.89 K/UL — SIGNIFICANT CHANGE UP (ref 3.8–10.5)
WBC # BLD: 9.05 K/UL — SIGNIFICANT CHANGE UP (ref 3.8–10.5)
WBC # BLD: 9.05 K/UL — SIGNIFICANT CHANGE UP (ref 3.8–10.5)
WBC # BLD: 9.22 K/UL — SIGNIFICANT CHANGE UP (ref 3.8–10.5)
WBC # BLD: 9.44 K/UL — SIGNIFICANT CHANGE UP (ref 3.8–10.5)
WBC # BLD: 9.72 K/UL — SIGNIFICANT CHANGE UP (ref 3.8–10.5)
WBC # BLD: 9.72 K/UL — SIGNIFICANT CHANGE UP (ref 3.8–10.5)
WBC # BLD: 9.75 K/UL — SIGNIFICANT CHANGE UP (ref 3.8–10.5)
WBC # FLD AUTO: 10.09 K/UL — SIGNIFICANT CHANGE UP (ref 3.8–10.5)
WBC # FLD AUTO: 10.09 K/UL — SIGNIFICANT CHANGE UP (ref 3.8–10.5)
WBC # FLD AUTO: 11.29 K/UL — HIGH (ref 3.8–10.5)
WBC # FLD AUTO: 12.13 K/UL — HIGH (ref 3.8–10.5)
WBC # FLD AUTO: 12.46 K/UL — HIGH (ref 3.8–10.5)
WBC # FLD AUTO: 14.13 K/UL — HIGH (ref 3.8–10.5)
WBC # FLD AUTO: 6.9 K/UL — SIGNIFICANT CHANGE UP (ref 3.8–10.5)
WBC # FLD AUTO: 6.9 K/UL — SIGNIFICANT CHANGE UP (ref 3.8–10.5)
WBC # FLD AUTO: 7.08 K/UL — SIGNIFICANT CHANGE UP (ref 3.8–10.5)
WBC # FLD AUTO: 7.08 K/UL — SIGNIFICANT CHANGE UP (ref 3.8–10.5)
WBC # FLD AUTO: 7.25 K/UL — SIGNIFICANT CHANGE UP (ref 3.8–10.5)
WBC # FLD AUTO: 7.25 K/UL — SIGNIFICANT CHANGE UP (ref 3.8–10.5)
WBC # FLD AUTO: 7.38 K/UL — SIGNIFICANT CHANGE UP (ref 3.8–10.5)
WBC # FLD AUTO: 7.38 K/UL — SIGNIFICANT CHANGE UP (ref 3.8–10.5)
WBC # FLD AUTO: 7.56 K/UL — SIGNIFICANT CHANGE UP (ref 3.8–10.5)
WBC # FLD AUTO: 7.56 K/UL — SIGNIFICANT CHANGE UP (ref 3.8–10.5)
WBC # FLD AUTO: 7.63 K/UL — SIGNIFICANT CHANGE UP (ref 3.8–10.5)
WBC # FLD AUTO: 7.63 K/UL — SIGNIFICANT CHANGE UP (ref 3.8–10.5)
WBC # FLD AUTO: 7.66 K/UL — SIGNIFICANT CHANGE UP (ref 3.8–10.5)
WBC # FLD AUTO: 7.66 K/UL — SIGNIFICANT CHANGE UP (ref 3.8–10.5)
WBC # FLD AUTO: 7.67 K/UL — SIGNIFICANT CHANGE UP (ref 3.8–10.5)
WBC # FLD AUTO: 7.67 K/UL — SIGNIFICANT CHANGE UP (ref 3.8–10.5)
WBC # FLD AUTO: 7.81 K/UL — SIGNIFICANT CHANGE UP (ref 3.8–10.5)
WBC # FLD AUTO: 7.86 K/UL — SIGNIFICANT CHANGE UP (ref 3.8–10.5)
WBC # FLD AUTO: 7.86 K/UL — SIGNIFICANT CHANGE UP (ref 3.8–10.5)
WBC # FLD AUTO: 7.9 K/UL — SIGNIFICANT CHANGE UP (ref 3.8–10.5)
WBC # FLD AUTO: 7.9 K/UL — SIGNIFICANT CHANGE UP (ref 3.8–10.5)
WBC # FLD AUTO: 8.12 K/UL — SIGNIFICANT CHANGE UP (ref 3.8–10.5)
WBC # FLD AUTO: 8.12 K/UL — SIGNIFICANT CHANGE UP (ref 3.8–10.5)
WBC # FLD AUTO: 8.25 K/UL — SIGNIFICANT CHANGE UP (ref 3.8–10.5)
WBC # FLD AUTO: 8.25 K/UL — SIGNIFICANT CHANGE UP (ref 3.8–10.5)
WBC # FLD AUTO: 8.41 K/UL — SIGNIFICANT CHANGE UP (ref 3.8–10.5)
WBC # FLD AUTO: 8.41 K/UL — SIGNIFICANT CHANGE UP (ref 3.8–10.5)
WBC # FLD AUTO: 8.6 K/UL — SIGNIFICANT CHANGE UP (ref 3.8–10.5)
WBC # FLD AUTO: 8.6 K/UL — SIGNIFICANT CHANGE UP (ref 3.8–10.5)
WBC # FLD AUTO: 8.76 K/UL — SIGNIFICANT CHANGE UP (ref 3.8–10.5)
WBC # FLD AUTO: 8.89 K/UL — SIGNIFICANT CHANGE UP (ref 3.8–10.5)
WBC # FLD AUTO: 9.05 K/UL — SIGNIFICANT CHANGE UP (ref 3.8–10.5)
WBC # FLD AUTO: 9.05 K/UL — SIGNIFICANT CHANGE UP (ref 3.8–10.5)
WBC # FLD AUTO: 9.22 K/UL — SIGNIFICANT CHANGE UP (ref 3.8–10.5)
WBC # FLD AUTO: 9.44 K/UL — SIGNIFICANT CHANGE UP (ref 3.8–10.5)
WBC # FLD AUTO: 9.72 K/UL — SIGNIFICANT CHANGE UP (ref 3.8–10.5)
WBC # FLD AUTO: 9.72 K/UL — SIGNIFICANT CHANGE UP (ref 3.8–10.5)
WBC # FLD AUTO: 9.75 K/UL — SIGNIFICANT CHANGE UP (ref 3.8–10.5)

## 2023-01-01 PROCEDURE — 93010 ELECTROCARDIOGRAM REPORT: CPT

## 2023-01-01 PROCEDURE — 83036 HEMOGLOBIN GLYCOSYLATED A1C: CPT

## 2023-01-01 PROCEDURE — 99285 EMERGENCY DEPT VISIT HI MDM: CPT

## 2023-01-01 PROCEDURE — 76604 US EXAM CHEST: CPT

## 2023-01-01 PROCEDURE — G0378: CPT

## 2023-01-01 PROCEDURE — 93306 TTE W/DOPPLER COMPLETE: CPT | Mod: 26

## 2023-01-01 PROCEDURE — 99222 1ST HOSP IP/OBS MODERATE 55: CPT

## 2023-01-01 PROCEDURE — 78582 LUNG VENTILAT&PERFUS IMAGING: CPT | Mod: 26

## 2023-01-01 PROCEDURE — 85730 THROMBOPLASTIN TIME PARTIAL: CPT

## 2023-01-01 PROCEDURE — 99233 SBSQ HOSP IP/OBS HIGH 50: CPT

## 2023-01-01 PROCEDURE — 93970 EXTREMITY STUDY: CPT | Mod: 26

## 2023-01-01 PROCEDURE — 99239 HOSP IP/OBS DSCHRG MGMT >30: CPT

## 2023-01-01 PROCEDURE — 99497 ADVNCD CARE PLAN 30 MIN: CPT

## 2023-01-01 PROCEDURE — 99223 1ST HOSP IP/OBS HIGH 75: CPT

## 2023-01-01 PROCEDURE — A9540: CPT

## 2023-01-01 PROCEDURE — 99232 SBSQ HOSP IP/OBS MODERATE 35: CPT

## 2023-01-01 PROCEDURE — 88112 CYTOPATH CELL ENHANCE TECH: CPT | Mod: 26

## 2023-01-01 PROCEDURE — 93308 TTE F-UP OR LMTD: CPT | Mod: 26

## 2023-01-01 PROCEDURE — 93005 ELECTROCARDIOGRAM TRACING: CPT

## 2023-01-01 PROCEDURE — 71045 X-RAY EXAM CHEST 1 VIEW: CPT | Mod: 26

## 2023-01-01 PROCEDURE — 80053 COMPREHEN METABOLIC PANEL: CPT

## 2023-01-01 PROCEDURE — 83735 ASSAY OF MAGNESIUM: CPT

## 2023-01-01 PROCEDURE — 99233 SBSQ HOSP IP/OBS HIGH 50: CPT | Mod: GC

## 2023-01-01 PROCEDURE — 0225U NFCT DS DNA&RNA 21 SARSCOV2: CPT

## 2023-01-01 PROCEDURE — 71250 CT THORAX DX C-: CPT

## 2023-01-01 PROCEDURE — 30901 CONTROL OF NOSEBLEED: CPT | Mod: LT

## 2023-01-01 PROCEDURE — 71045 X-RAY EXAM CHEST 1 VIEW: CPT

## 2023-01-01 PROCEDURE — 80048 BASIC METABOLIC PNL TOTAL CA: CPT

## 2023-01-01 PROCEDURE — 97116 GAIT TRAINING THERAPY: CPT

## 2023-01-01 PROCEDURE — 85027 COMPLETE CBC AUTOMATED: CPT

## 2023-01-01 PROCEDURE — 99222 1ST HOSP IP/OBS MODERATE 55: CPT | Mod: 25

## 2023-01-01 PROCEDURE — 94640 AIRWAY INHALATION TREATMENT: CPT

## 2023-01-01 PROCEDURE — 76775 US EXAM ABDO BACK WALL LIM: CPT

## 2023-01-01 PROCEDURE — 76775 US EXAM ABDO BACK WALL LIM: CPT | Mod: 26

## 2023-01-01 PROCEDURE — 93306 TTE W/DOPPLER COMPLETE: CPT

## 2023-01-01 PROCEDURE — 76604 US EXAM CHEST: CPT | Mod: 26

## 2023-01-01 PROCEDURE — 84484 ASSAY OF TROPONIN QUANT: CPT

## 2023-01-01 PROCEDURE — 36415 COLL VENOUS BLD VENIPUNCTURE: CPT

## 2023-01-01 PROCEDURE — 88305 TISSUE EXAM BY PATHOLOGIST: CPT | Mod: 26

## 2023-01-01 PROCEDURE — 84443 ASSAY THYROID STIM HORMONE: CPT

## 2023-01-01 PROCEDURE — 93970 EXTREMITY STUDY: CPT

## 2023-01-01 PROCEDURE — 85610 PROTHROMBIN TIME: CPT

## 2023-01-01 PROCEDURE — 96374 THER/PROPH/DIAG INJ IV PUSH: CPT

## 2023-01-01 PROCEDURE — 71250 CT THORAX DX C-: CPT | Mod: 26

## 2023-01-01 PROCEDURE — 85379 FIBRIN DEGRADATION QUANT: CPT

## 2023-01-01 PROCEDURE — 93308 TTE F-UP OR LMTD: CPT

## 2023-01-01 PROCEDURE — 99232 SBSQ HOSP IP/OBS MODERATE 35: CPT | Mod: FS

## 2023-01-01 PROCEDURE — 97162 PT EVAL MOD COMPLEX 30 MIN: CPT

## 2023-01-01 PROCEDURE — A9567: CPT

## 2023-01-01 PROCEDURE — 83880 ASSAY OF NATRIURETIC PEPTIDE: CPT

## 2023-01-01 PROCEDURE — 82803 BLOOD GASES ANY COMBINATION: CPT

## 2023-01-01 PROCEDURE — 36600 WITHDRAWAL OF ARTERIAL BLOOD: CPT

## 2023-01-01 PROCEDURE — 85025 COMPLETE CBC W/AUTO DIFF WBC: CPT

## 2023-01-01 PROCEDURE — 99223 1ST HOSP IP/OBS HIGH 75: CPT | Mod: FS

## 2023-01-01 PROCEDURE — 80061 LIPID PANEL: CPT

## 2023-01-01 PROCEDURE — 99285 EMERGENCY DEPT VISIT HI MDM: CPT | Mod: 25

## 2023-01-01 PROCEDURE — 78582 LUNG VENTILAT&PERFUS IMAGING: CPT

## 2023-01-01 PROCEDURE — 71250 CT THORAX DX C-: CPT | Mod: 26,MA

## 2023-01-01 RX ORDER — POLYETHYLENE GLYCOL 3350 17 G/17G
17 POWDER, FOR SOLUTION ORAL
Qty: 0 | Refills: 0 | DISCHARGE
Start: 2023-01-01

## 2023-01-01 RX ORDER — ALLOPURINOL 300 MG
100 TABLET ORAL DAILY
Refills: 0 | Status: DISCONTINUED | OUTPATIENT
Start: 2023-01-01 | End: 2023-01-01

## 2023-01-01 RX ORDER — ACETAMINOPHEN 500 MG
650 TABLET ORAL EVERY 6 HOURS
Refills: 0 | Status: DISCONTINUED | OUTPATIENT
Start: 2023-01-01 | End: 2023-01-01

## 2023-01-01 RX ORDER — ALBUTEROL 90 UG/1
2 AEROSOL, METERED ORAL EVERY 6 HOURS
Refills: 0 | Status: DISCONTINUED | OUTPATIENT
Start: 2023-01-01 | End: 2023-01-01

## 2023-01-01 RX ORDER — METOLAZONE 5 MG/1
1 TABLET ORAL
Qty: 10 | Refills: 0
Start: 2023-01-01

## 2023-01-01 RX ORDER — LANOLIN ALCOHOL/MO/W.PET/CERES
1 CREAM (GRAM) TOPICAL
Qty: 0 | Refills: 0 | DISCHARGE
Start: 2023-01-01

## 2023-01-01 RX ORDER — METOPROLOL TARTRATE 50 MG
50 TABLET ORAL DAILY
Refills: 0 | Status: DISCONTINUED | OUTPATIENT
Start: 2023-01-01 | End: 2024-01-01

## 2023-01-01 RX ORDER — BACITRACIN ZINC 500 UNIT/G
1 OINTMENT IN PACKET (EA) TOPICAL
Qty: 0 | Refills: 0 | DISCHARGE
Start: 2023-01-01

## 2023-01-01 RX ORDER — FUROSEMIDE 40 MG
1 TABLET ORAL
Qty: 0 | Refills: 0 | DISCHARGE
Start: 2023-01-01

## 2023-01-01 RX ORDER — BACITRACIN ZINC 500 UNIT/G
1 OINTMENT IN PACKET (EA) TOPICAL ONCE
Refills: 0 | Status: COMPLETED | OUTPATIENT
Start: 2023-01-01 | End: 2023-01-01

## 2023-01-01 RX ORDER — POLYETHYLENE GLYCOL 3350 17 G/17G
17 POWDER, FOR SOLUTION ORAL DAILY
Refills: 0 | Status: DISCONTINUED | OUTPATIENT
Start: 2023-01-01 | End: 2023-01-01

## 2023-01-01 RX ORDER — PANTOPRAZOLE SODIUM 20 MG/1
40 TABLET, DELAYED RELEASE ORAL
Refills: 0 | Status: DISCONTINUED | OUTPATIENT
Start: 2023-01-01 | End: 2023-01-01

## 2023-01-01 RX ORDER — FUROSEMIDE 40 MG
60 TABLET ORAL ONCE
Refills: 0 | Status: COMPLETED | OUTPATIENT
Start: 2023-01-01 | End: 2023-01-01

## 2023-01-01 RX ORDER — POTASSIUM CHLORIDE 20 MEQ
20 PACKET (EA) ORAL ONCE
Refills: 0 | Status: COMPLETED | OUTPATIENT
Start: 2023-01-01 | End: 2023-01-01

## 2023-01-01 RX ORDER — HYDROCORTISONE 1 %
1 OINTMENT (GRAM) TOPICAL ONCE
Refills: 0 | Status: COMPLETED | OUTPATIENT
Start: 2023-01-01 | End: 2023-01-01

## 2023-01-01 RX ORDER — HYDRALAZINE HCL 50 MG
25 TABLET ORAL EVERY 8 HOURS
Refills: 0 | Status: DISCONTINUED | OUTPATIENT
Start: 2023-01-01 | End: 2023-01-01

## 2023-01-01 RX ORDER — ATORVASTATIN CALCIUM 80 MG/1
40 TABLET, FILM COATED ORAL AT BEDTIME
Refills: 0 | Status: DISCONTINUED | OUTPATIENT
Start: 2023-01-01 | End: 2023-01-01

## 2023-01-01 RX ORDER — PANTOPRAZOLE SODIUM 20 MG/1
1 TABLET, DELAYED RELEASE ORAL
Qty: 0 | Refills: 0 | DISCHARGE
Start: 2023-01-01

## 2023-01-01 RX ORDER — BUDESONIDE AND FORMOTEROL FUMARATE DIHYDRATE 160; 4.5 UG/1; UG/1
2 AEROSOL RESPIRATORY (INHALATION)
Refills: 0 | Status: DISCONTINUED | OUTPATIENT
Start: 2023-01-01 | End: 2023-01-01

## 2023-01-01 RX ORDER — DAPAGLIFLOZIN 10 MG/1
10 TABLET, FILM COATED ORAL EVERY 24 HOURS
Refills: 0 | Status: DISCONTINUED | OUTPATIENT
Start: 2023-01-01 | End: 2023-01-01

## 2023-01-01 RX ORDER — METOPROLOL TARTRATE 50 MG
50 TABLET ORAL DAILY
Refills: 0 | Status: DISCONTINUED | OUTPATIENT
Start: 2023-01-01 | End: 2023-01-01

## 2023-01-01 RX ORDER — ATORVASTATIN CALCIUM 80 MG/1
1 TABLET, FILM COATED ORAL
Qty: 0 | Refills: 0 | DISCHARGE
Start: 2023-01-01

## 2023-01-01 RX ORDER — LANOLIN ALCOHOL/MO/W.PET/CERES
3 CREAM (GRAM) TOPICAL AT BEDTIME
Refills: 0 | Status: DISCONTINUED | OUTPATIENT
Start: 2023-01-01 | End: 2023-01-01

## 2023-01-01 RX ORDER — BACITRACIN ZINC 500 UNIT/G
1 OINTMENT IN PACKET (EA) TOPICAL DAILY
Refills: 0 | Status: DISCONTINUED | OUTPATIENT
Start: 2023-01-01 | End: 2024-01-01

## 2023-01-01 RX ORDER — FUROSEMIDE 40 MG
60 TABLET ORAL ONCE
Refills: 0 | Status: DISCONTINUED | OUTPATIENT
Start: 2023-01-01 | End: 2023-01-01

## 2023-01-01 RX ORDER — HEPARIN SODIUM 5000 [USP'U]/ML
5000 INJECTION INTRAVENOUS; SUBCUTANEOUS EVERY 8 HOURS
Refills: 0 | Status: DISCONTINUED | OUTPATIENT
Start: 2023-01-01 | End: 2023-01-01

## 2023-01-01 RX ORDER — DAPAGLIFLOZIN 10 MG/1
1 TABLET, FILM COATED ORAL
Qty: 30 | Refills: 0
Start: 2023-01-01

## 2023-01-01 RX ORDER — TIOTROPIUM BROMIDE 18 UG/1
2 CAPSULE ORAL; RESPIRATORY (INHALATION) DAILY
Refills: 0 | Status: DISCONTINUED | OUTPATIENT
Start: 2023-01-01 | End: 2024-01-01

## 2023-01-01 RX ORDER — TIOTROPIUM BROMIDE 18 UG/1
2 CAPSULE ORAL; RESPIRATORY (INHALATION) DAILY
Refills: 0 | Status: DISCONTINUED | OUTPATIENT
Start: 2023-01-01 | End: 2023-01-01

## 2023-01-01 RX ORDER — FUROSEMIDE 40 MG
80 TABLET ORAL ONCE
Refills: 0 | Status: COMPLETED | OUTPATIENT
Start: 2023-01-01 | End: 2023-01-01

## 2023-01-01 RX ORDER — POTASSIUM CHLORIDE 20 MEQ
40 PACKET (EA) ORAL ONCE
Refills: 0 | Status: COMPLETED | OUTPATIENT
Start: 2023-01-01 | End: 2023-01-01

## 2023-01-01 RX ORDER — FUROSEMIDE 40 MG
80 TABLET ORAL
Refills: 0 | Status: DISCONTINUED | OUTPATIENT
Start: 2023-01-01 | End: 2023-01-01

## 2023-01-01 RX ORDER — APIXABAN 2.5 MG/1
2.5 TABLET, FILM COATED ORAL
Refills: 0 | Status: DISCONTINUED | OUTPATIENT
Start: 2023-01-01 | End: 2023-01-01

## 2023-01-01 RX ORDER — METOLAZONE 5 MG/1
1 TABLET ORAL
Qty: 0 | Refills: 0 | DISCHARGE
Start: 2023-01-01

## 2023-01-01 RX ORDER — ALBUTEROL 90 UG/1
2 AEROSOL, METERED ORAL
Qty: 0 | Refills: 0 | DISCHARGE
Start: 2023-01-01

## 2023-01-01 RX ORDER — NITROGLYCERIN 6.5 MG
0.4 CAPSULE, EXTENDED RELEASE ORAL ONCE
Refills: 0 | Status: COMPLETED | OUTPATIENT
Start: 2023-01-01 | End: 2023-01-01

## 2023-01-01 RX ORDER — ACETAMINOPHEN 500 MG
2 TABLET ORAL
Qty: 0 | Refills: 0 | DISCHARGE
Start: 2023-01-01

## 2023-01-01 RX ORDER — BACITRACIN ZINC 500 UNIT/G
1 OINTMENT IN PACKET (EA) TOPICAL DAILY
Refills: 0 | Status: DISCONTINUED | OUTPATIENT
Start: 2023-01-01 | End: 2023-01-01

## 2023-01-01 RX ORDER — ENOXAPARIN SODIUM 100 MG/ML
70 INJECTION SUBCUTANEOUS EVERY 12 HOURS
Refills: 0 | Status: DISCONTINUED | OUTPATIENT
Start: 2023-01-01 | End: 2023-01-01

## 2023-01-01 RX ORDER — APIXABAN 2.5 MG/1
2.5 TABLET, FILM COATED ORAL EVERY 12 HOURS
Refills: 0 | Status: DISCONTINUED | OUTPATIENT
Start: 2023-01-01 | End: 2023-01-01

## 2023-01-01 RX ORDER — APIXABAN 2.5 MG/1
2.5 TABLET, FILM COATED ORAL EVERY 12 HOURS
Refills: 0 | Status: DISCONTINUED | OUTPATIENT
Start: 2023-01-01 | End: 2024-01-01

## 2023-01-01 RX ORDER — FUROSEMIDE 40 MG
60 TABLET ORAL
Refills: 0 | Status: DISCONTINUED | OUTPATIENT
Start: 2023-01-01 | End: 2023-01-01

## 2023-01-01 RX ORDER — ASPIRIN/CALCIUM CARB/MAGNESIUM 324 MG
81 TABLET ORAL DAILY
Refills: 0 | Status: DISCONTINUED | OUTPATIENT
Start: 2023-01-01 | End: 2023-01-01

## 2023-01-01 RX ORDER — DAPAGLIFLOZIN 10 MG/1
1 TABLET, FILM COATED ORAL
Qty: 30 | Refills: 0
Start: 2023-01-01 | End: 2024-01-01

## 2023-01-01 RX ORDER — LANOLIN ALCOHOL/MO/W.PET/CERES
3 CREAM (GRAM) TOPICAL ONCE
Refills: 0 | Status: COMPLETED | OUTPATIENT
Start: 2023-01-01 | End: 2023-01-01

## 2023-01-01 RX ORDER — TIOTROPIUM BROMIDE 18 UG/1
2 CAPSULE ORAL; RESPIRATORY (INHALATION)
Qty: 0 | Refills: 0 | DISCHARGE
Start: 2023-01-01

## 2023-01-01 RX ORDER — FUROSEMIDE 40 MG
40 TABLET ORAL
Refills: 0 | Status: DISCONTINUED | OUTPATIENT
Start: 2023-01-01 | End: 2023-01-01

## 2023-01-01 RX ORDER — PHENYLEPHRINE-SHARK LIVER OIL-MINERAL OIL-PETROLATUM RECTAL OINTMENT
1 OINTMENT (GRAM) RECTAL
Refills: 0 | Status: DISCONTINUED | OUTPATIENT
Start: 2023-01-01 | End: 2023-01-03

## 2023-01-01 RX ORDER — APIXABAN 2.5 MG/1
1 TABLET, FILM COATED ORAL
Qty: 60 | Refills: 0
Start: 2023-01-01 | End: 2023-01-01

## 2023-01-01 RX ORDER — BUDESONIDE AND FORMOTEROL FUMARATE DIHYDRATE 160; 4.5 UG/1; UG/1
2 AEROSOL RESPIRATORY (INHALATION)
Refills: 0 | Status: DISCONTINUED | OUTPATIENT
Start: 2023-01-01 | End: 2024-01-01

## 2023-01-01 RX ORDER — APIXABAN 2.5 MG/1
1 TABLET, FILM COATED ORAL
Qty: 20 | Refills: 0
Start: 2023-01-01 | End: 2023-01-01

## 2023-01-01 RX ORDER — SENNA PLUS 8.6 MG/1
2 TABLET ORAL
Qty: 0 | Refills: 0 | DISCHARGE
Start: 2023-01-01

## 2023-01-01 RX ORDER — SENNA PLUS 8.6 MG/1
2 TABLET ORAL AT BEDTIME
Refills: 0 | Status: DISCONTINUED | OUTPATIENT
Start: 2023-01-01 | End: 2023-01-01

## 2023-01-01 RX ORDER — ONDANSETRON 8 MG/1
4 TABLET, FILM COATED ORAL EVERY 8 HOURS
Refills: 0 | Status: DISCONTINUED | OUTPATIENT
Start: 2023-01-01 | End: 2023-01-01

## 2023-01-01 RX ORDER — METOPROLOL TARTRATE 50 MG
1 TABLET ORAL
Qty: 0 | Refills: 0 | DISCHARGE
Start: 2023-01-01

## 2023-01-01 RX ORDER — PANTOPRAZOLE SODIUM 20 MG/1
40 TABLET, DELAYED RELEASE ORAL
Refills: 0 | Status: DISCONTINUED | OUTPATIENT
Start: 2023-01-01 | End: 2024-01-01

## 2023-01-01 RX ORDER — ATORVASTATIN CALCIUM 80 MG/1
1 TABLET, FILM COATED ORAL
Qty: 30 | Refills: 0
Start: 2023-01-01 | End: 2023-01-01

## 2023-01-01 RX ORDER — ASPIRIN/CALCIUM CARB/MAGNESIUM 324 MG
81 TABLET ORAL DAILY
Refills: 0 | Status: DISCONTINUED | OUTPATIENT
Start: 2023-01-01 | End: 2024-01-01

## 2023-01-01 RX ORDER — ALLOPURINOL 300 MG
1 TABLET ORAL
Qty: 0 | Refills: 0 | DISCHARGE
Start: 2023-01-01

## 2023-01-01 RX ORDER — METOLAZONE 5 MG/1
1 TABLET ORAL
Qty: 4 | Refills: 0
Start: 2023-01-01

## 2023-01-01 RX ORDER — LANOLIN ALCOHOL/MO/W.PET/CERES
3 CREAM (GRAM) TOPICAL AT BEDTIME
Refills: 0 | Status: DISCONTINUED | OUTPATIENT
Start: 2023-01-01 | End: 2024-01-01

## 2023-01-01 RX ORDER — FUROSEMIDE 40 MG
20 TABLET ORAL ONCE
Refills: 0 | Status: COMPLETED | OUTPATIENT
Start: 2023-01-01 | End: 2023-01-01

## 2023-01-01 RX ORDER — FUROSEMIDE 40 MG
40 TABLET ORAL ONCE
Refills: 0 | Status: COMPLETED | OUTPATIENT
Start: 2023-01-01 | End: 2023-01-01

## 2023-01-01 RX ORDER — ATORVASTATIN CALCIUM 80 MG/1
40 TABLET, FILM COATED ORAL AT BEDTIME
Refills: 0 | Status: DISCONTINUED | OUTPATIENT
Start: 2023-01-01 | End: 2024-01-01

## 2023-01-01 RX ORDER — POLYETHYLENE GLYCOL 3350 17 G/17G
17 POWDER, FOR SOLUTION ORAL
Refills: 0 | Status: DISCONTINUED | OUTPATIENT
Start: 2023-01-01 | End: 2024-01-01

## 2023-01-01 RX ORDER — LANOLIN ALCOHOL/MO/W.PET/CERES
6 CREAM (GRAM) TOPICAL AT BEDTIME
Refills: 0 | Status: DISCONTINUED | OUTPATIENT
Start: 2023-01-01 | End: 2023-01-01

## 2023-01-01 RX ORDER — APIXABAN 2.5 MG/1
1 TABLET, FILM COATED ORAL
Qty: 0 | Refills: 0 | DISCHARGE
Start: 2023-01-01

## 2023-01-01 RX ORDER — ALLOPURINOL 300 MG
100 TABLET ORAL DAILY
Refills: 0 | Status: DISCONTINUED | OUTPATIENT
Start: 2023-01-01 | End: 2024-01-01

## 2023-01-01 RX ORDER — SENNA PLUS 8.6 MG/1
2 TABLET ORAL AT BEDTIME
Refills: 0 | Status: DISCONTINUED | OUTPATIENT
Start: 2023-01-01 | End: 2024-01-01

## 2023-01-01 RX ORDER — ASPIRIN/CALCIUM CARB/MAGNESIUM 324 MG
1 TABLET ORAL
Qty: 0 | Refills: 0 | DISCHARGE
Start: 2023-01-01

## 2023-01-01 RX ORDER — ALBUTEROL 90 UG/1
2 AEROSOL, METERED ORAL EVERY 6 HOURS
Refills: 0 | Status: DISCONTINUED | OUTPATIENT
Start: 2023-01-01 | End: 2024-01-01

## 2023-01-01 RX ORDER — FUROSEMIDE 40 MG
1 TABLET ORAL
Qty: 20 | Refills: 0
Start: 2023-01-01

## 2023-01-01 RX ORDER — BUDESONIDE AND FORMOTEROL FUMARATE DIHYDRATE 160; 4.5 UG/1; UG/1
2 AEROSOL RESPIRATORY (INHALATION)
Qty: 0 | Refills: 0 | DISCHARGE
Start: 2023-01-01

## 2023-01-01 RX ORDER — POLYETHYLENE GLYCOL 3350 17 G/17G
17 POWDER, FOR SOLUTION ORAL ONCE
Refills: 0 | Status: COMPLETED | OUTPATIENT
Start: 2023-01-01 | End: 2023-01-01

## 2023-01-01 RX ORDER — ACETAMINOPHEN 500 MG
650 TABLET ORAL EVERY 6 HOURS
Refills: 0 | Status: DISCONTINUED | OUTPATIENT
Start: 2023-01-01 | End: 2024-01-01

## 2023-01-01 RX ORDER — SENNA PLUS 8.6 MG/1
1 TABLET ORAL AT BEDTIME
Refills: 0 | Status: DISCONTINUED | OUTPATIENT
Start: 2023-01-01 | End: 2023-01-01

## 2023-01-01 RX ORDER — APIXABAN 2.5 MG/1
5 TABLET, FILM COATED ORAL EVERY 12 HOURS
Refills: 0 | Status: DISCONTINUED | OUTPATIENT
Start: 2023-01-01 | End: 2023-01-01

## 2023-01-01 RX ORDER — FUROSEMIDE 40 MG
40 TABLET ORAL DAILY
Refills: 0 | Status: DISCONTINUED | OUTPATIENT
Start: 2023-01-01 | End: 2023-01-01

## 2023-01-01 RX ORDER — ALPRAZOLAM 0.25 MG
0.25 TABLET ORAL ONCE
Refills: 0 | Status: DISCONTINUED | OUTPATIENT
Start: 2023-01-01 | End: 2023-01-01

## 2023-01-01 RX ORDER — DIPHENHYDRAMINE HCL/ZINC ACET 2 %-0.1 %
1 CREAM (GRAM) TOPICAL
Refills: 0 | Status: DISCONTINUED | OUTPATIENT
Start: 2023-01-01 | End: 2024-01-01

## 2023-01-01 RX ADMIN — Medication 40 MILLIGRAM(S): at 15:08

## 2023-01-01 RX ADMIN — Medication 25 MILLIGRAM(S): at 12:47

## 2023-01-01 RX ADMIN — Medication 50 MILLIGRAM(S): at 05:50

## 2023-01-01 RX ADMIN — Medication 80 MILLIGRAM(S): at 05:42

## 2023-01-01 RX ADMIN — APIXABAN 2.5 MILLIGRAM(S): 2.5 TABLET, FILM COATED ORAL at 18:36

## 2023-01-01 RX ADMIN — Medication 25 MILLIGRAM(S): at 21:34

## 2023-01-01 RX ADMIN — APIXABAN 2.5 MILLIGRAM(S): 2.5 TABLET, FILM COATED ORAL at 05:24

## 2023-01-01 RX ADMIN — Medication 6 MILLIGRAM(S): at 21:51

## 2023-01-01 RX ADMIN — Medication 3 MILLIGRAM(S): at 21:39

## 2023-01-01 RX ADMIN — SENNA PLUS 2 TABLET(S): 8.6 TABLET ORAL at 21:12

## 2023-01-01 RX ADMIN — Medication 1 APPLICATION(S): at 13:28

## 2023-01-01 RX ADMIN — BUDESONIDE AND FORMOTEROL FUMARATE DIHYDRATE 2 PUFF(S): 160; 4.5 AEROSOL RESPIRATORY (INHALATION) at 22:03

## 2023-01-01 RX ADMIN — Medication 50 MILLIGRAM(S): at 06:42

## 2023-01-01 RX ADMIN — Medication 40 MILLIGRAM(S): at 05:47

## 2023-01-01 RX ADMIN — ATORVASTATIN CALCIUM 40 MILLIGRAM(S): 80 TABLET, FILM COATED ORAL at 21:12

## 2023-01-01 RX ADMIN — Medication 40 MILLIGRAM(S): at 05:49

## 2023-01-01 RX ADMIN — BUDESONIDE AND FORMOTEROL FUMARATE DIHYDRATE 2 PUFF(S): 160; 4.5 AEROSOL RESPIRATORY (INHALATION) at 09:03

## 2023-01-01 RX ADMIN — ATORVASTATIN CALCIUM 40 MILLIGRAM(S): 80 TABLET, FILM COATED ORAL at 21:01

## 2023-01-01 RX ADMIN — Medication 1 APPLICATION(S): at 13:37

## 2023-01-01 RX ADMIN — PANTOPRAZOLE SODIUM 40 MILLIGRAM(S): 20 TABLET, DELAYED RELEASE ORAL at 05:41

## 2023-01-01 RX ADMIN — ATORVASTATIN CALCIUM 40 MILLIGRAM(S): 80 TABLET, FILM COATED ORAL at 21:59

## 2023-01-01 RX ADMIN — Medication 81 MILLIGRAM(S): at 13:26

## 2023-01-01 RX ADMIN — APIXABAN 2.5 MILLIGRAM(S): 2.5 TABLET, FILM COATED ORAL at 05:11

## 2023-01-01 RX ADMIN — Medication 100 MILLIGRAM(S): at 17:08

## 2023-01-01 RX ADMIN — Medication 25 MILLIGRAM(S): at 05:30

## 2023-01-01 RX ADMIN — Medication 25 MILLIGRAM(S): at 22:17

## 2023-01-01 RX ADMIN — SENNA PLUS 2 TABLET(S): 8.6 TABLET ORAL at 21:31

## 2023-01-01 RX ADMIN — ATORVASTATIN CALCIUM 40 MILLIGRAM(S): 80 TABLET, FILM COATED ORAL at 21:45

## 2023-01-01 RX ADMIN — Medication 25 MILLIGRAM(S): at 06:15

## 2023-01-01 RX ADMIN — Medication 25 MILLIGRAM(S): at 18:16

## 2023-01-01 RX ADMIN — Medication 25 MILLIGRAM(S): at 14:46

## 2023-01-01 RX ADMIN — PANTOPRAZOLE SODIUM 40 MILLIGRAM(S): 20 TABLET, DELAYED RELEASE ORAL at 05:57

## 2023-01-01 RX ADMIN — POLYETHYLENE GLYCOL 3350 17 GRAM(S): 17 POWDER, FOR SOLUTION ORAL at 05:33

## 2023-01-01 RX ADMIN — Medication 100 MILLIGRAM(S): at 11:21

## 2023-01-01 RX ADMIN — DAPAGLIFLOZIN 10 MILLIGRAM(S): 10 TABLET, FILM COATED ORAL at 17:35

## 2023-01-01 RX ADMIN — Medication 40 MILLIEQUIVALENT(S): at 12:47

## 2023-01-01 RX ADMIN — BUDESONIDE AND FORMOTEROL FUMARATE DIHYDRATE 2 PUFF(S): 160; 4.5 AEROSOL RESPIRATORY (INHALATION) at 08:37

## 2023-01-01 RX ADMIN — Medication 40 MILLIGRAM(S): at 06:43

## 2023-01-01 RX ADMIN — ENOXAPARIN SODIUM 70 MILLIGRAM(S): 100 INJECTION SUBCUTANEOUS at 06:07

## 2023-01-01 RX ADMIN — BUDESONIDE AND FORMOTEROL FUMARATE DIHYDRATE 2 PUFF(S): 160; 4.5 AEROSOL RESPIRATORY (INHALATION) at 08:45

## 2023-01-01 RX ADMIN — ATORVASTATIN CALCIUM 40 MILLIGRAM(S): 80 TABLET, FILM COATED ORAL at 22:32

## 2023-01-01 RX ADMIN — Medication 100 MILLIGRAM(S): at 12:27

## 2023-01-01 RX ADMIN — Medication 3 MILLIGRAM(S): at 21:44

## 2023-01-01 RX ADMIN — Medication 100 MILLIGRAM(S): at 12:46

## 2023-01-01 RX ADMIN — PANTOPRAZOLE SODIUM 40 MILLIGRAM(S): 20 TABLET, DELAYED RELEASE ORAL at 05:40

## 2023-01-01 RX ADMIN — ATORVASTATIN CALCIUM 40 MILLIGRAM(S): 80 TABLET, FILM COATED ORAL at 22:38

## 2023-01-01 RX ADMIN — Medication 100 MILLIGRAM(S): at 14:10

## 2023-01-01 RX ADMIN — APIXABAN 2.5 MILLIGRAM(S): 2.5 TABLET, FILM COATED ORAL at 16:58

## 2023-01-01 RX ADMIN — Medication 1 APPLICATION(S): at 11:52

## 2023-01-01 RX ADMIN — SENNA PLUS 2 TABLET(S): 8.6 TABLET ORAL at 21:11

## 2023-01-01 RX ADMIN — Medication 0.25 MILLIGRAM(S): at 22:22

## 2023-01-01 RX ADMIN — Medication 40 MILLIGRAM(S): at 05:16

## 2023-01-01 RX ADMIN — Medication 40 MILLIGRAM(S): at 16:51

## 2023-01-01 RX ADMIN — Medication 25 MILLIGRAM(S): at 17:07

## 2023-01-01 RX ADMIN — Medication 50 MILLIGRAM(S): at 05:42

## 2023-01-01 RX ADMIN — APIXABAN 2.5 MILLIGRAM(S): 2.5 TABLET, FILM COATED ORAL at 17:49

## 2023-01-01 RX ADMIN — ATORVASTATIN CALCIUM 40 MILLIGRAM(S): 80 TABLET, FILM COATED ORAL at 21:04

## 2023-01-01 RX ADMIN — Medication 1 APPLICATION(S): at 11:53

## 2023-01-01 RX ADMIN — Medication 50 MILLIGRAM(S): at 05:11

## 2023-01-01 RX ADMIN — Medication 50 MILLIGRAM(S): at 05:05

## 2023-01-01 RX ADMIN — Medication 1 TABLET(S): at 12:47

## 2023-01-01 RX ADMIN — Medication 3 MILLIGRAM(S): at 22:08

## 2023-01-01 RX ADMIN — Medication 650 MILLIGRAM(S): at 02:33

## 2023-01-01 RX ADMIN — Medication 25 MILLIGRAM(S): at 05:25

## 2023-01-01 RX ADMIN — APIXABAN 2.5 MILLIGRAM(S): 2.5 TABLET, FILM COATED ORAL at 05:50

## 2023-01-01 RX ADMIN — Medication 40 MILLIGRAM(S): at 05:51

## 2023-01-01 RX ADMIN — Medication 25 MILLIGRAM(S): at 23:31

## 2023-01-01 RX ADMIN — Medication 10 MILLIGRAM(S): at 05:50

## 2023-01-01 RX ADMIN — SENNA PLUS 2 TABLET(S): 8.6 TABLET ORAL at 21:15

## 2023-01-01 RX ADMIN — Medication 80 MILLIGRAM(S): at 14:46

## 2023-01-01 RX ADMIN — Medication 650 MILLIGRAM(S): at 02:03

## 2023-01-01 RX ADMIN — BUDESONIDE AND FORMOTEROL FUMARATE DIHYDRATE 2 PUFF(S): 160; 4.5 AEROSOL RESPIRATORY (INHALATION) at 22:27

## 2023-01-01 RX ADMIN — Medication 1 APPLICATION(S): at 12:02

## 2023-01-01 RX ADMIN — PANTOPRAZOLE SODIUM 40 MILLIGRAM(S): 20 TABLET, DELAYED RELEASE ORAL at 05:11

## 2023-01-01 RX ADMIN — Medication 25 MILLIGRAM(S): at 14:11

## 2023-01-01 RX ADMIN — Medication 25 MILLIGRAM(S): at 06:42

## 2023-01-01 RX ADMIN — POLYETHYLENE GLYCOL 3350 17 GRAM(S): 17 POWDER, FOR SOLUTION ORAL at 05:05

## 2023-01-01 RX ADMIN — Medication 1 TABLET(S): at 12:27

## 2023-01-01 RX ADMIN — PANTOPRAZOLE SODIUM 40 MILLIGRAM(S): 20 TABLET, DELAYED RELEASE ORAL at 05:30

## 2023-01-01 RX ADMIN — Medication 81 MILLIGRAM(S): at 12:42

## 2023-01-01 RX ADMIN — POLYETHYLENE GLYCOL 3350 17 GRAM(S): 17 POWDER, FOR SOLUTION ORAL at 12:27

## 2023-01-01 RX ADMIN — Medication 100 MILLIGRAM(S): at 12:30

## 2023-01-01 RX ADMIN — APIXABAN 2.5 MILLIGRAM(S): 2.5 TABLET, FILM COATED ORAL at 05:30

## 2023-01-01 RX ADMIN — POLYETHYLENE GLYCOL 3350 17 GRAM(S): 17 POWDER, FOR SOLUTION ORAL at 12:11

## 2023-01-01 RX ADMIN — Medication 3 MILLIGRAM(S): at 21:13

## 2023-01-01 RX ADMIN — Medication 20 MILLIGRAM(S): at 12:36

## 2023-01-01 RX ADMIN — Medication 1 SPRAY(S): at 06:04

## 2023-01-01 RX ADMIN — POLYETHYLENE GLYCOL 3350 17 GRAM(S): 17 POWDER, FOR SOLUTION ORAL at 17:44

## 2023-01-01 RX ADMIN — POLYETHYLENE GLYCOL 3350 17 GRAM(S): 17 POWDER, FOR SOLUTION ORAL at 12:48

## 2023-01-01 RX ADMIN — Medication 100 MILLIGRAM(S): at 11:16

## 2023-01-01 RX ADMIN — Medication 1 SPRAY(S): at 18:15

## 2023-01-01 RX ADMIN — Medication 25 MILLIGRAM(S): at 13:49

## 2023-01-01 RX ADMIN — Medication 25 MILLIGRAM(S): at 05:03

## 2023-01-01 RX ADMIN — SENNA PLUS 2 TABLET(S): 8.6 TABLET ORAL at 22:32

## 2023-01-01 RX ADMIN — APIXABAN 2.5 MILLIGRAM(S): 2.5 TABLET, FILM COATED ORAL at 06:16

## 2023-01-01 RX ADMIN — Medication 25 MILLIGRAM(S): at 15:50

## 2023-01-01 RX ADMIN — Medication 25 MILLIGRAM(S): at 05:46

## 2023-01-01 RX ADMIN — Medication 40 MILLIGRAM(S): at 05:31

## 2023-01-01 RX ADMIN — Medication 25 MILLIGRAM(S): at 15:14

## 2023-01-01 RX ADMIN — Medication 50 MILLIGRAM(S): at 05:14

## 2023-01-01 RX ADMIN — APIXABAN 2.5 MILLIGRAM(S): 2.5 TABLET, FILM COATED ORAL at 05:27

## 2023-01-01 RX ADMIN — Medication 25 MILLIGRAM(S): at 21:13

## 2023-01-01 RX ADMIN — Medication 40 MILLIGRAM(S): at 12:48

## 2023-01-01 RX ADMIN — Medication 81 MILLIGRAM(S): at 11:26

## 2023-01-01 RX ADMIN — APIXABAN 2.5 MILLIGRAM(S): 2.5 TABLET, FILM COATED ORAL at 05:03

## 2023-01-01 RX ADMIN — Medication 3 MILLIGRAM(S): at 22:28

## 2023-01-01 RX ADMIN — ATORVASTATIN CALCIUM 40 MILLIGRAM(S): 80 TABLET, FILM COATED ORAL at 22:20

## 2023-01-01 RX ADMIN — PANTOPRAZOLE SODIUM 40 MILLIGRAM(S): 20 TABLET, DELAYED RELEASE ORAL at 06:12

## 2023-01-01 RX ADMIN — Medication 40 MILLIGRAM(S): at 05:03

## 2023-01-01 RX ADMIN — APIXABAN 2.5 MILLIGRAM(S): 2.5 TABLET, FILM COATED ORAL at 06:13

## 2023-01-01 RX ADMIN — Medication 3 MILLIGRAM(S): at 22:32

## 2023-01-01 RX ADMIN — APIXABAN 2.5 MILLIGRAM(S): 2.5 TABLET, FILM COATED ORAL at 05:21

## 2023-01-01 RX ADMIN — Medication 100 MILLIGRAM(S): at 12:38

## 2023-01-01 RX ADMIN — Medication 25 MILLIGRAM(S): at 22:38

## 2023-01-01 RX ADMIN — SENNA PLUS 2 TABLET(S): 8.6 TABLET ORAL at 21:38

## 2023-01-01 RX ADMIN — Medication 25 MILLIGRAM(S): at 05:47

## 2023-01-01 RX ADMIN — APIXABAN 2.5 MILLIGRAM(S): 2.5 TABLET, FILM COATED ORAL at 08:36

## 2023-01-01 RX ADMIN — Medication 1 APPLICATION(S): at 18:11

## 2023-01-01 RX ADMIN — APIXABAN 2.5 MILLIGRAM(S): 2.5 TABLET, FILM COATED ORAL at 16:39

## 2023-01-01 RX ADMIN — PANTOPRAZOLE SODIUM 40 MILLIGRAM(S): 20 TABLET, DELAYED RELEASE ORAL at 05:24

## 2023-01-01 RX ADMIN — Medication 81 MILLIGRAM(S): at 11:37

## 2023-01-01 RX ADMIN — Medication 25 MILLIGRAM(S): at 21:11

## 2023-01-01 RX ADMIN — SENNA PLUS 2 TABLET(S): 8.6 TABLET ORAL at 22:22

## 2023-01-01 RX ADMIN — APIXABAN 2.5 MILLIGRAM(S): 2.5 TABLET, FILM COATED ORAL at 17:53

## 2023-01-01 RX ADMIN — Medication 40 MILLIGRAM(S): at 13:49

## 2023-01-01 RX ADMIN — CEFEPIME 100 MILLIGRAM(S): 1 INJECTION, POWDER, FOR SOLUTION INTRAMUSCULAR; INTRAVENOUS at 18:15

## 2023-01-01 RX ADMIN — Medication 100 MILLIGRAM(S): at 12:42

## 2023-01-01 RX ADMIN — APIXABAN 2.5 MILLIGRAM(S): 2.5 TABLET, FILM COATED ORAL at 06:42

## 2023-01-01 RX ADMIN — Medication 6 MILLIGRAM(S): at 21:41

## 2023-01-01 RX ADMIN — PANTOPRAZOLE SODIUM 40 MILLIGRAM(S): 20 TABLET, DELAYED RELEASE ORAL at 05:50

## 2023-01-01 RX ADMIN — Medication 50 MILLIGRAM(S): at 05:40

## 2023-01-01 RX ADMIN — APIXABAN 2.5 MILLIGRAM(S): 2.5 TABLET, FILM COATED ORAL at 05:32

## 2023-01-01 RX ADMIN — Medication 50 MILLIGRAM(S): at 05:29

## 2023-01-01 RX ADMIN — Medication 40 MILLIGRAM(S): at 16:18

## 2023-01-01 RX ADMIN — Medication 81 MILLIGRAM(S): at 13:02

## 2023-01-01 RX ADMIN — APIXABAN 2.5 MILLIGRAM(S): 2.5 TABLET, FILM COATED ORAL at 05:46

## 2023-01-01 RX ADMIN — APIXABAN 2.5 MILLIGRAM(S): 2.5 TABLET, FILM COATED ORAL at 05:42

## 2023-01-01 RX ADMIN — Medication 1 APPLICATION(S): at 18:41

## 2023-01-01 RX ADMIN — Medication 40 MILLIGRAM(S): at 13:32

## 2023-01-01 RX ADMIN — Medication 40 MILLIGRAM(S): at 05:28

## 2023-01-01 RX ADMIN — Medication 6 MILLIGRAM(S): at 21:10

## 2023-01-01 RX ADMIN — Medication 3 MILLIGRAM(S): at 23:28

## 2023-01-01 RX ADMIN — ATORVASTATIN CALCIUM 40 MILLIGRAM(S): 80 TABLET, FILM COATED ORAL at 21:34

## 2023-01-01 RX ADMIN — DAPAGLIFLOZIN 10 MILLIGRAM(S): 10 TABLET, FILM COATED ORAL at 05:25

## 2023-01-01 RX ADMIN — ATORVASTATIN CALCIUM 40 MILLIGRAM(S): 80 TABLET, FILM COATED ORAL at 21:31

## 2023-01-01 RX ADMIN — Medication 50 MILLIGRAM(S): at 05:58

## 2023-01-01 RX ADMIN — ATORVASTATIN CALCIUM 40 MILLIGRAM(S): 80 TABLET, FILM COATED ORAL at 22:22

## 2023-01-01 RX ADMIN — BUDESONIDE AND FORMOTEROL FUMARATE DIHYDRATE 2 PUFF(S): 160; 4.5 AEROSOL RESPIRATORY (INHALATION) at 20:43

## 2023-01-01 RX ADMIN — SENNA PLUS 2 TABLET(S): 8.6 TABLET ORAL at 21:34

## 2023-01-01 RX ADMIN — ATORVASTATIN CALCIUM 40 MILLIGRAM(S): 80 TABLET, FILM COATED ORAL at 23:28

## 2023-01-01 RX ADMIN — Medication 40 MILLIGRAM(S): at 08:36

## 2023-01-01 RX ADMIN — PANTOPRAZOLE SODIUM 40 MILLIGRAM(S): 20 TABLET, DELAYED RELEASE ORAL at 05:21

## 2023-01-01 RX ADMIN — Medication 80 MILLIGRAM(S): at 13:38

## 2023-01-01 RX ADMIN — Medication 81 MILLIGRAM(S): at 11:16

## 2023-01-01 RX ADMIN — DAPAGLIFLOZIN 10 MILLIGRAM(S): 10 TABLET, FILM COATED ORAL at 17:15

## 2023-01-01 RX ADMIN — Medication 25 MILLIGRAM(S): at 08:37

## 2023-01-01 RX ADMIN — Medication 81 MILLIGRAM(S): at 12:03

## 2023-01-01 RX ADMIN — SENNA PLUS 2 TABLET(S): 8.6 TABLET ORAL at 23:28

## 2023-01-01 RX ADMIN — PANTOPRAZOLE SODIUM 40 MILLIGRAM(S): 20 TABLET, DELAYED RELEASE ORAL at 05:47

## 2023-01-01 RX ADMIN — Medication 3 MILLIGRAM(S): at 21:33

## 2023-01-01 RX ADMIN — Medication 50 MILLIGRAM(S): at 05:30

## 2023-01-01 RX ADMIN — Medication 40 MILLIGRAM(S): at 05:26

## 2023-01-01 RX ADMIN — Medication 50 MILLIGRAM(S): at 06:14

## 2023-01-01 RX ADMIN — Medication 25 MILLIGRAM(S): at 12:38

## 2023-01-01 RX ADMIN — POLYETHYLENE GLYCOL 3350 17 GRAM(S): 17 POWDER, FOR SOLUTION ORAL at 12:00

## 2023-01-01 RX ADMIN — Medication 81 MILLIGRAM(S): at 12:25

## 2023-01-01 RX ADMIN — Medication 50 MILLIGRAM(S): at 08:10

## 2023-01-01 RX ADMIN — Medication 1 SPRAY(S): at 13:07

## 2023-01-01 RX ADMIN — Medication 25 MILLIGRAM(S): at 21:02

## 2023-01-01 RX ADMIN — Medication 40 MILLIGRAM(S): at 05:30

## 2023-01-01 RX ADMIN — Medication 100 MILLIGRAM(S): at 12:00

## 2023-01-01 RX ADMIN — Medication 100 MILLIGRAM(S): at 15:50

## 2023-01-01 RX ADMIN — Medication 25 MILLIGRAM(S): at 21:59

## 2023-01-01 RX ADMIN — POLYETHYLENE GLYCOL 3350 17 GRAM(S): 17 POWDER, FOR SOLUTION ORAL at 17:09

## 2023-01-01 RX ADMIN — Medication 60 MILLIGRAM(S): at 11:46

## 2023-01-01 RX ADMIN — SENNA PLUS 2 TABLET(S): 8.6 TABLET ORAL at 21:52

## 2023-01-01 RX ADMIN — Medication 1 TABLET(S): at 12:30

## 2023-01-01 RX ADMIN — Medication 25 MILLIGRAM(S): at 21:42

## 2023-01-01 RX ADMIN — ATORVASTATIN CALCIUM 40 MILLIGRAM(S): 80 TABLET, FILM COATED ORAL at 21:42

## 2023-01-01 RX ADMIN — TIOTROPIUM BROMIDE 2 PUFF(S): 18 CAPSULE ORAL; RESPIRATORY (INHALATION) at 09:04

## 2023-01-01 RX ADMIN — POLYETHYLENE GLYCOL 3350 17 GRAM(S): 17 POWDER, FOR SOLUTION ORAL at 06:14

## 2023-01-01 RX ADMIN — Medication 100 MILLIGRAM(S): at 13:03

## 2023-01-01 RX ADMIN — PANTOPRAZOLE SODIUM 40 MILLIGRAM(S): 20 TABLET, DELAYED RELEASE ORAL at 06:43

## 2023-01-01 RX ADMIN — APIXABAN 2.5 MILLIGRAM(S): 2.5 TABLET, FILM COATED ORAL at 17:23

## 2023-01-01 RX ADMIN — APIXABAN 2.5 MILLIGRAM(S): 2.5 TABLET, FILM COATED ORAL at 17:57

## 2023-01-01 RX ADMIN — Medication 100 MILLIGRAM(S): at 11:37

## 2023-01-01 RX ADMIN — CEFEPIME 100 MILLIGRAM(S): 1 INJECTION, POWDER, FOR SOLUTION INTRAMUSCULAR; INTRAVENOUS at 05:49

## 2023-01-01 RX ADMIN — Medication 25 MILLIGRAM(S): at 05:50

## 2023-01-01 RX ADMIN — Medication 1 TABLET(S): at 11:12

## 2023-01-01 RX ADMIN — PANTOPRAZOLE SODIUM 40 MILLIGRAM(S): 20 TABLET, DELAYED RELEASE ORAL at 05:45

## 2023-01-01 RX ADMIN — Medication 100 MILLIGRAM(S): at 13:26

## 2023-01-01 RX ADMIN — APIXABAN 2.5 MILLIGRAM(S): 2.5 TABLET, FILM COATED ORAL at 05:51

## 2023-01-01 RX ADMIN — POLYETHYLENE GLYCOL 3350 17 GRAM(S): 17 POWDER, FOR SOLUTION ORAL at 05:11

## 2023-01-01 RX ADMIN — PANTOPRAZOLE SODIUM 40 MILLIGRAM(S): 20 TABLET, DELAYED RELEASE ORAL at 05:13

## 2023-01-01 RX ADMIN — Medication 81 MILLIGRAM(S): at 12:48

## 2023-01-01 RX ADMIN — ATORVASTATIN CALCIUM 40 MILLIGRAM(S): 80 TABLET, FILM COATED ORAL at 21:39

## 2023-01-01 RX ADMIN — Medication 50 MILLIGRAM(S): at 05:21

## 2023-01-01 RX ADMIN — APIXABAN 2.5 MILLIGRAM(S): 2.5 TABLET, FILM COATED ORAL at 17:39

## 2023-01-01 RX ADMIN — Medication 81 MILLIGRAM(S): at 11:53

## 2023-01-01 RX ADMIN — Medication 3 MILLIGRAM(S): at 22:22

## 2023-01-01 RX ADMIN — Medication 50 MILLIGRAM(S): at 05:28

## 2023-01-01 RX ADMIN — TIOTROPIUM BROMIDE 2 PUFF(S): 18 CAPSULE ORAL; RESPIRATORY (INHALATION) at 08:45

## 2023-01-01 RX ADMIN — Medication 20 MILLIEQUIVALENT(S): at 12:27

## 2023-01-01 RX ADMIN — Medication 50 MILLIGRAM(S): at 06:12

## 2023-01-01 RX ADMIN — Medication 1 APPLICATION(S): at 11:10

## 2023-01-01 RX ADMIN — SENNA PLUS 2 TABLET(S): 8.6 TABLET ORAL at 21:41

## 2023-01-01 RX ADMIN — Medication 6 MILLIGRAM(S): at 21:11

## 2023-01-01 RX ADMIN — DAPAGLIFLOZIN 10 MILLIGRAM(S): 10 TABLET, FILM COATED ORAL at 16:40

## 2023-01-01 RX ADMIN — Medication 81 MILLIGRAM(S): at 17:07

## 2023-01-01 RX ADMIN — PANTOPRAZOLE SODIUM 40 MILLIGRAM(S): 20 TABLET, DELAYED RELEASE ORAL at 06:15

## 2023-01-01 RX ADMIN — Medication 81 MILLIGRAM(S): at 12:17

## 2023-01-01 RX ADMIN — Medication 25 MILLIGRAM(S): at 05:58

## 2023-01-01 RX ADMIN — SENNA PLUS 2 TABLET(S): 8.6 TABLET ORAL at 22:28

## 2023-01-01 RX ADMIN — Medication 81 MILLIGRAM(S): at 11:22

## 2023-01-01 RX ADMIN — TIOTROPIUM BROMIDE 2 PUFF(S): 18 CAPSULE ORAL; RESPIRATORY (INHALATION) at 22:03

## 2023-01-01 RX ADMIN — APIXABAN 2.5 MILLIGRAM(S): 2.5 TABLET, FILM COATED ORAL at 17:48

## 2023-01-01 RX ADMIN — APIXABAN 2.5 MILLIGRAM(S): 2.5 TABLET, FILM COATED ORAL at 18:14

## 2023-01-01 RX ADMIN — Medication 60 MILLIGRAM(S): at 18:44

## 2023-01-01 RX ADMIN — Medication 50 MILLIGRAM(S): at 06:15

## 2023-01-01 RX ADMIN — Medication 81 MILLIGRAM(S): at 13:08

## 2023-01-01 RX ADMIN — Medication 60 MILLIGRAM(S): at 15:07

## 2023-01-01 RX ADMIN — Medication 1 APPLICATION(S): at 11:37

## 2023-01-01 RX ADMIN — POLYETHYLENE GLYCOL 3350 17 GRAM(S): 17 POWDER, FOR SOLUTION ORAL at 17:23

## 2023-01-01 RX ADMIN — Medication 25 MILLIGRAM(S): at 22:32

## 2023-01-01 RX ADMIN — Medication 50 MILLIGRAM(S): at 06:18

## 2023-01-01 RX ADMIN — Medication 25 MILLIGRAM(S): at 13:08

## 2023-01-01 RX ADMIN — ATORVASTATIN CALCIUM 40 MILLIGRAM(S): 80 TABLET, FILM COATED ORAL at 22:08

## 2023-01-01 RX ADMIN — Medication 3 MILLIGRAM(S): at 22:38

## 2023-01-01 RX ADMIN — ATORVASTATIN CALCIUM 40 MILLIGRAM(S): 80 TABLET, FILM COATED ORAL at 21:38

## 2023-01-01 RX ADMIN — POLYETHYLENE GLYCOL 3350 17 GRAM(S): 17 POWDER, FOR SOLUTION ORAL at 17:49

## 2023-01-01 RX ADMIN — Medication 600 MILLIGRAM(S): at 18:15

## 2023-01-01 RX ADMIN — BUDESONIDE AND FORMOTEROL FUMARATE DIHYDRATE 2 PUFF(S): 160; 4.5 AEROSOL RESPIRATORY (INHALATION) at 21:07

## 2023-01-01 RX ADMIN — Medication 25 MILLIGRAM(S): at 05:39

## 2023-01-01 RX ADMIN — PANTOPRAZOLE SODIUM 40 MILLIGRAM(S): 20 TABLET, DELAYED RELEASE ORAL at 06:14

## 2023-01-01 RX ADMIN — Medication 25 MILLIGRAM(S): at 05:49

## 2023-01-01 RX ADMIN — Medication 25 MILLIGRAM(S): at 13:17

## 2023-01-01 RX ADMIN — Medication 25 MILLIGRAM(S): at 15:32

## 2023-01-01 RX ADMIN — Medication 1 TABLET(S): at 12:00

## 2023-01-01 RX ADMIN — APIXABAN 2.5 MILLIGRAM(S): 2.5 TABLET, FILM COATED ORAL at 05:06

## 2023-01-01 RX ADMIN — Medication 81 MILLIGRAM(S): at 12:29

## 2023-01-01 RX ADMIN — POLYETHYLENE GLYCOL 3350 17 GRAM(S): 17 POWDER, FOR SOLUTION ORAL at 05:24

## 2023-01-01 RX ADMIN — Medication 25 MILLIGRAM(S): at 05:15

## 2023-01-01 RX ADMIN — Medication 100 MILLIGRAM(S): at 12:12

## 2023-01-01 RX ADMIN — HEPARIN SODIUM 5000 UNIT(S): 5000 INJECTION INTRAVENOUS; SUBCUTANEOUS at 05:50

## 2023-01-01 RX ADMIN — POLYETHYLENE GLYCOL 3350 17 GRAM(S): 17 POWDER, FOR SOLUTION ORAL at 17:07

## 2023-01-01 RX ADMIN — Medication 50 MILLIGRAM(S): at 06:13

## 2023-01-01 RX ADMIN — Medication 1 TABLET(S): at 12:26

## 2023-01-01 RX ADMIN — PANTOPRAZOLE SODIUM 40 MILLIGRAM(S): 20 TABLET, DELAYED RELEASE ORAL at 08:37

## 2023-01-01 RX ADMIN — Medication 50 MILLIGRAM(S): at 05:51

## 2023-01-01 RX ADMIN — POLYETHYLENE GLYCOL 3350 17 GRAM(S): 17 POWDER, FOR SOLUTION ORAL at 12:25

## 2023-01-01 RX ADMIN — Medication 25 MILLIGRAM(S): at 16:48

## 2023-01-01 RX ADMIN — Medication 50 MILLIGRAM(S): at 05:02

## 2023-01-01 RX ADMIN — APIXABAN 2.5 MILLIGRAM(S): 2.5 TABLET, FILM COATED ORAL at 18:45

## 2023-01-01 RX ADMIN — PANTOPRAZOLE SODIUM 40 MILLIGRAM(S): 20 TABLET, DELAYED RELEASE ORAL at 05:25

## 2023-01-01 RX ADMIN — Medication 81 MILLIGRAM(S): at 11:13

## 2023-01-01 RX ADMIN — TIOTROPIUM BROMIDE 2 PUFF(S): 18 CAPSULE ORAL; RESPIRATORY (INHALATION) at 08:37

## 2023-01-01 RX ADMIN — POLYETHYLENE GLYCOL 3350 17 GRAM(S): 17 POWDER, FOR SOLUTION ORAL at 06:17

## 2023-01-01 RX ADMIN — APIXABAN 2.5 MILLIGRAM(S): 2.5 TABLET, FILM COATED ORAL at 17:04

## 2023-01-01 RX ADMIN — Medication 1 TABLET(S): at 14:12

## 2023-01-01 RX ADMIN — Medication 40 MILLIGRAM(S): at 09:22

## 2023-01-01 RX ADMIN — Medication 100 MILLIGRAM(S): at 11:10

## 2023-01-01 RX ADMIN — Medication 100 MILLIGRAM(S): at 11:13

## 2023-01-01 RX ADMIN — Medication 40 MILLIGRAM(S): at 05:40

## 2023-01-01 RX ADMIN — Medication 25 MILLIGRAM(S): at 21:19

## 2023-01-01 RX ADMIN — Medication 40 MILLIEQUIVALENT(S): at 12:25

## 2023-01-01 RX ADMIN — ALBUTEROL 2 PUFF(S): 90 AEROSOL, METERED ORAL at 15:15

## 2023-01-01 RX ADMIN — Medication 0.4 MILLIGRAM(S): at 22:50

## 2023-01-01 RX ADMIN — Medication 100 MILLIGRAM(S): at 11:26

## 2023-01-01 RX ADMIN — Medication 40 MILLIGRAM(S): at 15:32

## 2023-01-01 RX ADMIN — Medication 25 MILLIGRAM(S): at 05:29

## 2023-01-01 RX ADMIN — POLYETHYLENE GLYCOL 3350 17 GRAM(S): 17 POWDER, FOR SOLUTION ORAL at 12:37

## 2023-01-01 RX ADMIN — Medication 25 MILLIGRAM(S): at 18:03

## 2023-01-01 RX ADMIN — SENNA PLUS 2 TABLET(S): 8.6 TABLET ORAL at 21:33

## 2023-01-01 RX ADMIN — Medication 1 APPLICATION(S): at 12:47

## 2023-01-01 RX ADMIN — Medication 25 MILLIGRAM(S): at 05:41

## 2023-01-01 RX ADMIN — HEPARIN SODIUM 5000 UNIT(S): 5000 INJECTION INTRAVENOUS; SUBCUTANEOUS at 22:32

## 2023-01-01 RX ADMIN — Medication 40 MILLIGRAM(S): at 17:07

## 2023-01-01 RX ADMIN — Medication 40 MILLIGRAM(S): at 18:09

## 2023-01-01 RX ADMIN — ATORVASTATIN CALCIUM 40 MILLIGRAM(S): 80 TABLET, FILM COATED ORAL at 21:15

## 2023-01-01 RX ADMIN — Medication 1 TABLET(S): at 12:55

## 2023-01-01 RX ADMIN — Medication 81 MILLIGRAM(S): at 12:51

## 2023-01-01 RX ADMIN — POLYETHYLENE GLYCOL 3350 17 GRAM(S): 17 POWDER, FOR SOLUTION ORAL at 18:01

## 2023-01-01 RX ADMIN — Medication 40 MILLIGRAM(S): at 06:14

## 2023-01-01 RX ADMIN — SENNA PLUS 2 TABLET(S): 8.6 TABLET ORAL at 22:08

## 2023-01-01 RX ADMIN — Medication 40 MILLIGRAM(S): at 05:21

## 2023-01-01 RX ADMIN — APIXABAN 2.5 MILLIGRAM(S): 2.5 TABLET, FILM COATED ORAL at 17:24

## 2023-01-01 RX ADMIN — TIOTROPIUM BROMIDE 2 PUFF(S): 18 CAPSULE ORAL; RESPIRATORY (INHALATION) at 08:46

## 2023-01-01 RX ADMIN — Medication 600 MILLIGRAM(S): at 05:50

## 2023-01-01 RX ADMIN — Medication 50 MILLIGRAM(S): at 05:46

## 2023-01-01 RX ADMIN — APIXABAN 2.5 MILLIGRAM(S): 2.5 TABLET, FILM COATED ORAL at 21:51

## 2023-01-01 RX ADMIN — APIXABAN 2.5 MILLIGRAM(S): 2.5 TABLET, FILM COATED ORAL at 18:01

## 2023-01-01 RX ADMIN — APIXABAN 2.5 MILLIGRAM(S): 2.5 TABLET, FILM COATED ORAL at 06:12

## 2023-01-01 RX ADMIN — Medication 100 MILLIGRAM(S): at 11:45

## 2023-01-01 RX ADMIN — Medication 100 MILLIGRAM(S): at 12:51

## 2023-01-01 RX ADMIN — PANTOPRAZOLE SODIUM 40 MILLIGRAM(S): 20 TABLET, DELAYED RELEASE ORAL at 05:15

## 2023-01-01 RX ADMIN — Medication 40 MILLIGRAM(S): at 05:57

## 2023-01-01 RX ADMIN — SENNA PLUS 2 TABLET(S): 8.6 TABLET ORAL at 22:31

## 2023-01-01 RX ADMIN — POLYETHYLENE GLYCOL 3350 17 GRAM(S): 17 POWDER, FOR SOLUTION ORAL at 11:12

## 2023-01-01 RX ADMIN — Medication 1 TABLET(S): at 12:25

## 2023-01-01 RX ADMIN — Medication 81 MILLIGRAM(S): at 12:00

## 2023-01-01 RX ADMIN — Medication 3 MILLIGRAM(S): at 01:06

## 2023-01-01 RX ADMIN — Medication 3 MILLIGRAM(S): at 21:18

## 2023-01-01 RX ADMIN — Medication 200 MILLIGRAM(S): at 13:08

## 2023-01-01 RX ADMIN — ALBUTEROL 2 PUFF(S): 90 AEROSOL, METERED ORAL at 09:02

## 2023-01-01 RX ADMIN — ATORVASTATIN CALCIUM 40 MILLIGRAM(S): 80 TABLET, FILM COATED ORAL at 21:18

## 2023-01-01 RX ADMIN — Medication 1 APPLICATION(S): at 12:21

## 2023-01-01 RX ADMIN — Medication 3 MILLIGRAM(S): at 22:01

## 2023-01-01 RX ADMIN — Medication 100 MILLIGRAM(S): at 12:17

## 2023-01-01 RX ADMIN — POLYETHYLENE GLYCOL 3350 17 GRAM(S): 17 POWDER, FOR SOLUTION ORAL at 05:13

## 2023-01-01 RX ADMIN — Medication 81 MILLIGRAM(S): at 12:38

## 2023-01-01 RX ADMIN — APIXABAN 2.5 MILLIGRAM(S): 2.5 TABLET, FILM COATED ORAL at 17:15

## 2023-01-01 RX ADMIN — APIXABAN 2.5 MILLIGRAM(S): 2.5 TABLET, FILM COATED ORAL at 13:48

## 2023-01-01 RX ADMIN — Medication 100 MILLIGRAM(S): at 11:53

## 2023-01-01 RX ADMIN — ATORVASTATIN CALCIUM 40 MILLIGRAM(S): 80 TABLET, FILM COATED ORAL at 21:51

## 2023-01-01 RX ADMIN — SENNA PLUS 2 TABLET(S): 8.6 TABLET ORAL at 21:01

## 2023-01-01 RX ADMIN — Medication 81 MILLIGRAM(S): at 11:45

## 2023-01-01 RX ADMIN — Medication 200 MILLIGRAM(S): at 22:17

## 2023-01-01 RX ADMIN — ALBUTEROL 2 PUFF(S): 90 AEROSOL, METERED ORAL at 21:07

## 2023-01-01 RX ADMIN — Medication 25 MILLIGRAM(S): at 13:32

## 2023-01-01 RX ADMIN — Medication 1 APPLICATION(S): at 12:43

## 2023-01-01 RX ADMIN — APIXABAN 2.5 MILLIGRAM(S): 2.5 TABLET, FILM COATED ORAL at 18:09

## 2023-01-01 RX ADMIN — Medication 50 MILLIGRAM(S): at 05:32

## 2023-01-01 RX ADMIN — Medication 81 MILLIGRAM(S): at 12:27

## 2023-01-01 RX ADMIN — POLYETHYLENE GLYCOL 3350 17 GRAM(S): 17 POWDER, FOR SOLUTION ORAL at 06:13

## 2023-01-01 RX ADMIN — APIXABAN 2.5 MILLIGRAM(S): 2.5 TABLET, FILM COATED ORAL at 18:32

## 2023-01-01 RX ADMIN — ENOXAPARIN SODIUM 70 MILLIGRAM(S): 100 INJECTION SUBCUTANEOUS at 18:10

## 2023-01-01 RX ADMIN — Medication 40 MILLIGRAM(S): at 14:14

## 2023-01-01 RX ADMIN — APIXABAN 2.5 MILLIGRAM(S): 2.5 TABLET, FILM COATED ORAL at 18:15

## 2023-01-01 RX ADMIN — Medication 25 MILLIGRAM(S): at 21:31

## 2023-01-01 RX ADMIN — Medication 1 SPRAY(S): at 06:05

## 2023-01-01 RX ADMIN — Medication 50 MILLIGRAM(S): at 05:24

## 2023-01-01 RX ADMIN — APIXABAN 2.5 MILLIGRAM(S): 2.5 TABLET, FILM COATED ORAL at 17:44

## 2023-01-01 RX ADMIN — Medication 40 MILLIGRAM(S): at 18:07

## 2023-01-01 RX ADMIN — POLYETHYLENE GLYCOL 3350 17 GRAM(S): 17 POWDER, FOR SOLUTION ORAL at 15:32

## 2023-01-01 RX ADMIN — Medication 25 MILLIGRAM(S): at 05:28

## 2023-01-01 RX ADMIN — Medication 60 MILLIGRAM(S): at 09:26

## 2023-01-01 RX ADMIN — SENNA PLUS 2 TABLET(S): 8.6 TABLET ORAL at 22:20

## 2023-01-01 RX ADMIN — Medication 81 MILLIGRAM(S): at 14:11

## 2023-01-01 RX ADMIN — TIOTROPIUM BROMIDE 2 PUFF(S): 18 CAPSULE ORAL; RESPIRATORY (INHALATION) at 08:56

## 2023-01-01 RX ADMIN — Medication 60 MILLIGRAM(S): at 13:23

## 2023-01-01 RX ADMIN — Medication 50 MILLIGRAM(S): at 08:36

## 2023-01-01 RX ADMIN — Medication 1 TABLET(S): at 12:12

## 2023-01-01 RX ADMIN — Medication 25 MILLIGRAM(S): at 11:12

## 2023-01-01 RX ADMIN — SENNA PLUS 2 TABLET(S): 8.6 TABLET ORAL at 21:18

## 2023-01-01 RX ADMIN — POLYETHYLENE GLYCOL 3350 17 GRAM(S): 17 POWDER, FOR SOLUTION ORAL at 12:26

## 2023-01-01 RX ADMIN — Medication 6 MILLIGRAM(S): at 21:12

## 2023-01-01 RX ADMIN — Medication 200 MILLIGRAM(S): at 05:50

## 2023-01-01 RX ADMIN — Medication 40 MILLIGRAM(S): at 15:52

## 2023-01-01 RX ADMIN — BUDESONIDE AND FORMOTEROL FUMARATE DIHYDRATE 2 PUFF(S): 160; 4.5 AEROSOL RESPIRATORY (INHALATION) at 09:30

## 2023-01-01 RX ADMIN — APIXABAN 2.5 MILLIGRAM(S): 2.5 TABLET, FILM COATED ORAL at 05:40

## 2023-01-01 RX ADMIN — ATORVASTATIN CALCIUM 40 MILLIGRAM(S): 80 TABLET, FILM COATED ORAL at 21:33

## 2023-01-01 RX ADMIN — Medication 60 MILLIGRAM(S): at 05:30

## 2023-01-01 RX ADMIN — TIOTROPIUM BROMIDE 2 PUFF(S): 18 CAPSULE ORAL; RESPIRATORY (INHALATION) at 09:30

## 2023-01-01 RX ADMIN — Medication 50 MILLIGRAM(S): at 05:13

## 2023-01-01 RX ADMIN — Medication 6 MILLIGRAM(S): at 21:30

## 2023-01-01 RX ADMIN — APIXABAN 2.5 MILLIGRAM(S): 2.5 TABLET, FILM COATED ORAL at 05:12

## 2023-01-01 RX ADMIN — Medication 81 MILLIGRAM(S): at 11:10

## 2023-01-01 RX ADMIN — Medication 40 MILLIGRAM(S): at 13:18

## 2023-01-01 RX ADMIN — PANTOPRAZOLE SODIUM 40 MILLIGRAM(S): 20 TABLET, DELAYED RELEASE ORAL at 05:05

## 2023-01-01 RX ADMIN — Medication 1 TABLET(S): at 11:27

## 2023-01-01 RX ADMIN — Medication 1 APPLICATION(S): at 06:13

## 2023-01-01 RX ADMIN — Medication 1 SPRAY(S): at 18:16

## 2023-01-01 RX ADMIN — Medication 3 MILLIGRAM(S): at 22:20

## 2023-01-01 RX ADMIN — BUDESONIDE AND FORMOTEROL FUMARATE DIHYDRATE 2 PUFF(S): 160; 4.5 AEROSOL RESPIRATORY (INHALATION) at 08:56

## 2023-01-01 RX ADMIN — Medication 100 MILLIGRAM(S): at 13:07

## 2023-01-01 RX ADMIN — Medication 1 APPLICATION(S): at 04:10

## 2023-01-01 RX ADMIN — PANTOPRAZOLE SODIUM 40 MILLIGRAM(S): 20 TABLET, DELAYED RELEASE ORAL at 05:29

## 2023-01-01 RX ADMIN — Medication 81 MILLIGRAM(S): at 12:26

## 2023-01-01 RX ADMIN — SENNA PLUS 2 TABLET(S): 8.6 TABLET ORAL at 21:44

## 2023-01-01 RX ADMIN — Medication 25 MILLIGRAM(S): at 05:21

## 2023-01-01 RX ADMIN — Medication 100 MILLIGRAM(S): at 12:02

## 2023-01-01 RX ADMIN — Medication 1 TABLET(S): at 12:37

## 2023-01-01 RX ADMIN — PANTOPRAZOLE SODIUM 40 MILLIGRAM(S): 20 TABLET, DELAYED RELEASE ORAL at 05:04

## 2023-01-01 RX ADMIN — Medication 81 MILLIGRAM(S): at 12:12

## 2023-01-01 RX ADMIN — POLYETHYLENE GLYCOL 3350 17 GRAM(S): 17 POWDER, FOR SOLUTION ORAL at 06:12

## 2023-01-01 RX ADMIN — Medication 40 MILLIGRAM(S): at 12:37

## 2023-01-01 RX ADMIN — SENNA PLUS 2 TABLET(S): 8.6 TABLET ORAL at 21:59

## 2023-01-01 RX ADMIN — Medication 25 MILLIGRAM(S): at 21:04

## 2023-01-01 RX ADMIN — SENNA PLUS 2 TABLET(S): 8.6 TABLET ORAL at 22:17

## 2023-01-01 RX ADMIN — Medication 25 MILLIGRAM(S): at 21:51

## 2023-01-01 RX ADMIN — ATORVASTATIN CALCIUM 40 MILLIGRAM(S): 80 TABLET, FILM COATED ORAL at 22:28

## 2023-01-01 NOTE — PROGRESS NOTE ADULT - SUBJECTIVE AND OBJECTIVE BOX
CC: f/u for influenza and pneumonia    Patient reports: he is upset with room temperature    REVIEW OF SYSTEMS:  All other review of systems negative (Comprehensive ROS)    Antimicrobials Day #  :day 5  cefepime   IVPB      cefepime   IVPB 1000 milliGRAM(s) IV Intermittent every 12 hours    Other Medications Reviewed  MEDICATIONS  (STANDING):  albuterol    90 MICROgram(s) HFA Inhaler 2 Puff(s) Inhalation every 6 hours  allopurinol 100 milliGRAM(s) Oral daily  apixaban 2.5 milliGRAM(s) Oral every 12 hours  aspirin enteric coated 81 milliGRAM(s) Oral daily  benzonatate 200 milliGRAM(s) Oral three times a day  budesonide 160 MICROgram(s)/formoterol 4.5 MICROgram(s) Inhaler 2 Puff(s) Inhalation two times a day  cefepime   IVPB      cefepime   IVPB 1000 milliGRAM(s) IV Intermittent every 12 hours  fluticasone propionate 50 MICROgram(s)/spray Nasal Spray 1 Spray(s) Both Nostrils two times a day  furosemide    Tablet 40 milliGRAM(s) Oral daily  guaiFENesin  milliGRAM(s) Oral every 12 hours  hydrALAZINE 25 milliGRAM(s) Oral every 8 hours  metoprolol succinate ER 50 milliGRAM(s) Oral daily  senna 2 Tablet(s) Oral at bedtime  sodium chloride 0.65% Nasal 1 Spray(s) Both Nostrils every 6 hours  tiotropium 2.5 MICROgram(s) Inhaler 2 Puff(s) Inhalation daily    T(F): 98.1 (01-01-23 @ 04:53), Max: 98.1 (12-31-22 @ 21:28)  HR: 78 (01-01-23 @ 15:17)  BP: 139/59 (01-01-23 @ 13:48)  RR: 16 (01-01-23 @ 13:48)  SpO2: 95% (01-01-23 @ 15:17)  Wt(kg): --    PHYSICAL EXAM:  General: alert, no acute distress  Eyes:  anicteric, no conjunctival injection, no discharge  Oropharynx: no lesions or injection 	  Neck: supple, without adenopathy  Lungs: distant BS  Heart: regular rate and rhythm; no murmur, rubs or gallops  Abdomen: soft, nondistended, nontender, without mass or organomegaly  Skin: no lesions  Extremities: no clubbing, cyanosis,+ edema  Neurologic: alert, oriented, moves all extremities    LAB RESULTS:                        11.4   14.13 )-----------( 270      ( 01 Jan 2023 06:30 )             34.0     01-01    137  |  101  |  53<H>  ----------------------------<  109<H>  4.2   |  29  |  1.37<H>    Ca    8.8      01 Jan 2023 06:30    TPro  6.3  /  Alb  2.1<L>  /  TBili  0.4  /  DBili  x   /  AST  24  /  ALT  23  /  AlkPhos  48  12-31    LIVER FUNCTIONS - ( 31 Dec 2022 07:20 )  Alb: 2.1 g/dL / Pro: 6.3 g/dL / ALK PHOS: 48 U/L / ALT: 23 U/L / AST: 24 U/L / GGT: x             MICROBIOLOGY:  RECENT CULTURES:      RADIOLOGY REVIEWED:

## 2023-01-01 NOTE — PROGRESS NOTE ADULT - ASSESSMENT
Physical Examination:  GENERAL:               Alert,  no acute distress.    HEENT:                    no JVD, Moist MM  PULM:                     Bilateral air entry, Clear to auscultation bilaterally, no significant sputum production, no Rales, No Rhonchi, no Wheezing  CVS:                         S1, S2,  +Murmur  ABD:                        Soft, nondistended, nontender, normoactive bowel sounds,   EXT:                         mild edema, nontender, No Cyanosis or Clubbing   NEURO:                  Alert, oriented, interactive, nonfocal, follows commands  PSYC:                      Calm, + Insight.        88 year old male with a PMH of CVA, CHF, HTN, afib on eliquis, hip fx s/p fall and prior intubation for HF exacerbation presenting to [facility] ED for SOB and CP. Pt noted to be hypoxemic with elevated sbp to 200's. Pt tx with NIV, lasix and nitro with +response. ICU team consulted. Influenza A + with mild elevated troponin, elevated bnp and ?IRINEO on labs. When we arrived pt was alert and oriented, with O2 sat 100%, rate controlled afib on monitor and sbp 140. Pt trial off bipap with NC ~4 liters without desaturation or respiratory distress. ABG obtained with the following results: 7.37 /  45 / 87 / 26 97% on 4 liters NC. Troponin mildly elevated and has stabilized with no major change and ekg is without acute ischemia noted currently appearing to be demand ischemia at this time (also he is on AC at home), cardiology contacted by ED MD. BNP elevated to 6k. Given pt appears comfortable and states that he is "feeling better" along with his neurological, respiratory (off NIV) and hemodynamic stability  he is not currently a candidate for ICU and is appropriate for monitoring on telemetry under medicine service. However, if any changes should occur please contact our team immediately. Thank you. Recommending the following:    Assessment  1. Influenza A now with multifocal pna  2. 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 Elevated trop, r/o NSTEMI  4. Underlying Afib on Eliquis, HTN, CVA,   5. Increasing Cr / IRINEO normal baseline    Plan   Monitor on ROOM air  Finish course of antibiotics as per ID  Continue Symbicort, duoneb  Oral diet  encouraged incentive spirometry   prednisone taper to off as ordered  ASA, A/c with Eliquis   case d/w Dr. Aguilar   Dispo planning   Goals of Care: Full code

## 2023-01-01 NOTE — PROGRESS NOTE ADULT - ASSESSMENT
88y male with hx of CVA, CHF, HTN, afib, hip fx s/p fall. He preseted to  [facility] on 12/18  with SOB and CP, elevated sbp to 200's. He was treated with NIV, lasix and nitro. He tested +Influenza A with mild elevated troponin, elevated bnp and ?IRINEO. He completed renally dosed Tamiflu and prednisone taper, currently 20 mg daily. He cont to feel SOB and with rising WBC and hence ID eval requested.   He denies any fevers or chills, has weak, unproductive cough  CT chest with multifocal infiltrates  PC is .2, legionella urine antigen negative, strep pneumo urine antigen negative,and his blood cultures are negative.  MRSA --negative  He appears to be making slow favorable progress  Suggest  1. Cont Cefepime to cover for hospital acquired pneumonia, day 5, favor 5-7 days, consider in next 1-2 days  2.Diurese as per medical services .Steroids per pulmonary  3.NSTEMI per cardiology  4.Suspect dyspnea multifactorial-CHF, post influenza,bacterial  5. Monitor WBC trend--slowly moderating, may also be increased secondary to prednisone.

## 2023-01-01 NOTE — PROGRESS NOTE ADULT - ASSESSMENT
88 year old male with a PMH of CVA, CHF, HTN, afib on eliquis, hip fx s/p fall and prior intubation for HF exacerbation presenting to [facility] ED for SOB and CP admitted to ICU for acute hypoxix respiratory failure, flash pulmonary edema and acute CHF exacerbation also found to be Flu A positive now downgraded to medical floors    #acute hypoxic respiratory failure  #multifocal PNA  #influenza A  - s/p BiPAP in the ICU and IV lasix  - titrated off o2 and now on RA- saturating well  - continue steroid taper- now on prednisone 10mg x4 days  - CT chest showed multifocal PNA  - CXR 12/31 still with infiltrates but better than before  - continue cefepime- course as per ID, day 5 (favor 5-7 days of IV abx as per ID)  - s/p tamiflu treatment  - strept and legionella urine antigen negative, MRSA nasal PCR negative  - continue symbicort, nasal spray  - patient does not like duoneb- will change to albuterol inhaler and tiotropium  - continue chest PT   - ID following, recs appreciated  - pulm following, recs appreciated- discussed with Dr. Hernandez    #Acute Pulmonary Edema  #Hypertensive Urgency with Acute on Chronic Combined CHF  - s/p nitro gtt for hypertensive urgency  - Hold ARB due to renal function  - continue daily lasix 40mg qd - follow renal fxn  - continue hydralazine 25 mg q8h, metoprolol succ 50 mg daily  - cardiac follow up to optimize meds  - monitor volume status, daily weights, I/Os, replete lytes as needed    #Elevated Troponin  #NSTEMI, likely type II  - likely demand ischemia  - cardiology appreciated  - cont medical management, cont asa, eliquis, bb    #IRINEO on CKD 3  - hold ARB  - avoid nephrotoxins  - renally adjusted meds  - monitor BMP  - appreciate Renal consult - d/w Dr. Pineda. Cont lasix for now- May need to accept a higher creatinine.      #Chronic Atrial Fibrillation  - continue metoprolol for rate control, eliquis for stroke ppx    #History of CVA  - continue ASA    #DVT PPx  - eliquis adjusted to 2.5 BID for renal fxn    PT- recommend [place]    patient prefers to update family on his own. Agreeable to [place] at Ángel Sethi 88 year old male with a PMH of CVA, CHF, HTN, afib on eliquis, hip fx s/p fall and prior intubation for HF exacerbation presenting to [facility] ED for SOB and CP admitted to ICU for acute hypoxix respiratory failure, flash pulmonary edema and acute CHF exacerbation also found to be Flu A positive now downgraded to medical floors    #acute hypoxic respiratory failure  #multifocal PNA  #influenza A  - s/p BiPAP in the ICU and IV lasix  - titrated off o2 and now on RA- saturating well  - continue steroid taper- now on prednisone 10mg x4 days  - CT chest showed multifocal PNA  - CXR 12/31 still with infiltrates but better than before  - continue cefepime- course as per ID, day 5 (favor 5-7 days of IV abx as per ID)  - s/p tamiflu treatment  - strept and legionella urine antigen negative, MRSA nasal PCR negative  - continue symbicort, nasal spray  - patient does not like duoneb- will change to albuterol inhaler and tiotropium  - continue chest PT   - ID following, recs appreciated  - pulm following, recs appreciated- discussed with Dr. Hernandez    #Acute Pulmonary Edema  #Hypertensive Urgency with Acute on Chronic Combined CHF  - s/p nitro gtt for hypertensive urgency  - Hold ARB due to renal function  - continue daily lasix 40mg qd - follow renal fxn  - continue hydralazine 25 mg q8h, metoprolol succ 50 mg daily  - cardiac follow up to optimize meds  - monitor volume status, daily weights, I/Os, replete lytes as needed    #Elevated Troponin  #NSTEMI, likely type II  - likely demand ischemia  - cardiology appreciated  - cont medical management, cont asa, eliquis, bb    #IIRNEO on CKD 3  - hold ARB  - avoid nephrotoxins  - renally adjusted meds  - monitor BMP  - appreciate Renal consult - d/w Dr. Pineda. Cont lasix for now- May need to accept a higher creatinine.      #Chronic Atrial Fibrillation  - continue metoprolol for rate control, eliquis for stroke ppx    #History of CVA  - continue ASA    #DVT PPx  - eliquis adjusted to 2.5 BID for renal fxn    PT- recommend acute rehab    patient prefers to update family on his own

## 2023-01-01 NOTE — PROGRESS NOTE ADULT - SUBJECTIVE AND OBJECTIVE BOX
Patient is a 88y old  Male who presents with a chief complaint of shortness of breath (31 Dec 2022 13:29)      Patient seen and examined at bedside. No events overnight. Currently on 2L NC. Patient feels better today than yesterday, also appears better. Cough is improved as is breathing. Denies chest pain, abd pain.    ALLERGIES:  No Known Allergies    MEDICATIONS  (STANDING):  albuterol    90 MICROgram(s) HFA Inhaler 2 Puff(s) Inhalation every 6 hours  allopurinol 100 milliGRAM(s) Oral daily  apixaban 2.5 milliGRAM(s) Oral every 12 hours  aspirin enteric coated 81 milliGRAM(s) Oral daily  benzonatate 200 milliGRAM(s) Oral three times a day  budesonide 160 MICROgram(s)/formoterol 4.5 MICROgram(s) Inhaler 2 Puff(s) Inhalation two times a day  cefepime   IVPB      cefepime   IVPB 1000 milliGRAM(s) IV Intermittent every 12 hours  fluticasone propionate 50 MICROgram(s)/spray Nasal Spray 1 Spray(s) Both Nostrils two times a day  furosemide    Tablet 40 milliGRAM(s) Oral daily  guaiFENesin  milliGRAM(s) Oral every 12 hours  hydrALAZINE 25 milliGRAM(s) Oral every 8 hours  metoprolol succinate ER 50 milliGRAM(s) Oral daily  senna 2 Tablet(s) Oral at bedtime  sodium chloride 0.65% Nasal 1 Spray(s) Both Nostrils every 6 hours  tiotropium 2.5 MICROgram(s) Inhaler 2 Puff(s) Inhalation daily    MEDICATIONS  (PRN):  bisacodyl 5 milliGRAM(s) Oral every 12 hours PRN Constipation  Eucerin Cream 1 Application(s) 1 Applicatorful Topical four times a day PRN dry/itchy skin    Vital Signs Last 24 Hrs  T(F): 98.1 (01 Jan 2023 04:53), Max: 98.1 (31 Dec 2022 21:28)  HR: 75 (01 Jan 2023 09:04) (64 - 83)  BP: 127/67 (01 Jan 2023 06:00) (125/66 - 150/61)  RR: 16 (01 Jan 2023 06:00) (16 - 16)  SpO2: 95% (01 Jan 2023 09:04) (93% - 100%)  I&O's Summary    31 Dec 2022 07:01  -  01 Jan 2023 07:00  --------------------------------------------------------  IN: 720 mL / OUT: 300 mL / NET: 420 mL    01 Jan 2023 07:01  -  01 Jan 2023 10:35  --------------------------------------------------------  IN: 400 mL / OUT: 360 mL / NET: 40 mL        PHYSICAL EXAM:  GENERAL: NAD, sitting in chair  HEAD:  Atraumatic, Normocephalic  EYES: conjunctiva and sclera clear  ENMT: Moist mucous membranes, Good dentition, no thrush  CHEST/LUNG: diminished throughout, wheezing auscultated with rhonchi throughout   HEART: RRR; S1/S2, No murmur  ABDOMEN: Soft, Nontender, Nondistended; Bowel sounds present  EXTREMITIES: No calf tenderness, No cyanosis, 1+ pitting edema b/l LE  SKIN: Warm, perfused  PSYCH: Normal mood, Normal affect      LABS:                        11.4   14.13 )-----------( 270      ( 01 Jan 2023 06:30 )             34.0     01-01    137  |  101  |  53  ----------------------------<  109  4.2   |  29  |  1.37    Ca    8.8      01 Jan 2023 06:30    TPro  6.3  /  Alb  2.1  /  TBili  0.4  /  DBili  x   /  AST  24  /  ALT  23  /  AlkPhos  48  12-31                                    Culture - Blood (collected 27 Dec 2022 11:45)  Source: .Blood Blood-Peripheral  Preliminary Report (28 Dec 2022 15:09):    No growth to date.    Culture - Blood (collected 27 Dec 2022 11:40)  Source: .Blood Blood-Peripheral  Preliminary Report (28 Dec 2022 15:09):    No growth to date.          RADIOLOGY & ADDITIONAL TESTS:    Care Discussed with Consultants/Other Providers:

## 2023-01-01 NOTE — PROGRESS NOTE ADULT - SUBJECTIVE AND OBJECTIVE BOX
Follow-up Pulmonary Progress Note  Chief Complaint : Heart failure      patient seen and examined  on Room air sat 97%  states cough is better  finishing antibiotics in 2 days    Allergies :No Known Allergies      PAST MEDICAL & SURGICAL HISTORY:  Afib    Congestive heart failure (CHF)    CVA (cerebral vascular accident)    Hypertension, unspecified type    No significant past surgical history        Medications:  MEDICATIONS  (STANDING):  albuterol    90 MICROgram(s) HFA Inhaler 2 Puff(s) Inhalation every 6 hours  allopurinol 100 milliGRAM(s) Oral daily  apixaban 2.5 milliGRAM(s) Oral every 12 hours  aspirin enteric coated 81 milliGRAM(s) Oral daily  benzonatate 200 milliGRAM(s) Oral three times a day  budesonide 160 MICROgram(s)/formoterol 4.5 MICROgram(s) Inhaler 2 Puff(s) Inhalation two times a day  cefepime   IVPB      cefepime   IVPB 1000 milliGRAM(s) IV Intermittent every 12 hours  fluticasone propionate 50 MICROgram(s)/spray Nasal Spray 1 Spray(s) Both Nostrils two times a day  furosemide    Tablet 40 milliGRAM(s) Oral daily  guaiFENesin  milliGRAM(s) Oral every 12 hours  hydrALAZINE 25 milliGRAM(s) Oral every 8 hours  metoprolol succinate ER 50 milliGRAM(s) Oral daily  senna 2 Tablet(s) Oral at bedtime  sodium chloride 0.65% Nasal 1 Spray(s) Both Nostrils every 6 hours  tiotropium 2.5 MICROgram(s) Inhaler 2 Puff(s) Inhalation daily    MEDICATIONS  (PRN):  bisacodyl 5 milliGRAM(s) Oral every 12 hours PRN Constipation  Eucerin Cream 1 Application(s) 1 Applicatorful Topical four times a day PRN dry/itchy skin  hemorrhoidal Ointment 1 Application(s) Rectal two times a day PRN rectal itching/ pain      Antibiotics History  cefepime   IVPB    , 12-27-22 @ 15:37  cefepime   IVPB 1000 milliGRAM(s) IV Intermittent once, 12-27-22 @ 14:00  cefepime   IVPB 1000 milliGRAM(s) IV Intermittent every 12 hours, 12-28-22 @ 06:00  cefTRIAXone   IVPB    , 12-27-22 @ 07:30  cefTRIAXone   IVPB 1000 milliGRAM(s) IV Intermittent once, 12-27-22 @ 07:30, Stop order after: 1 Doses  cefTRIAXone   IVPB 1000 milliGRAM(s) IV Intermittent every 24 hours, 12-28-22 @ 07:30, Stop order after: 7 Days  oseltamivir 75 milliGRAM(s) Oral once, 12-18-22 @ 20:55, Stop order after: 1 Doses  oseltamivir 30 milliGRAM(s) Oral every 12 hours, 12-19-22 @ 09:00, Stop order after: 4 Days  oseltamivir 30 milliGRAM(s) Oral daily, 12-23-22 @ 00:00, Stop order after: 1 Doses      Heme Medications   apixaban 2.5 milliGRAM(s) Oral every 12 hours, 12-22-22 @ 13:59  aspirin enteric coated 81 milliGRAM(s) Oral daily, 12-17-22 @ 11:33      GI Medications  bisacodyl 5 milliGRAM(s) Oral every 12 hours, 12-29-22 @ 14:59, Routine PRN  senna 2 Tablet(s) Oral at bedtime, 12-17-22 @ 11:34, Routine        LABS:                        11.4   14.13 )-----------( 270      ( 01 Jan 2023 06:30 )             34.0     01-01    137  |  101  |  53<H>  ----------------------------<  109<H>  4.2   |  29  |  1.37<H>    Ca    8.8      01 Jan 2023 06:30    TPro  6.3  /  Alb  2.1<L>  /  TBili  0.4  /  DBili  x   /  AST  24  /  ALT  23  /  AlkPhos  48  12-31             CULTURES: (if applicable)    Culture - Blood (collected 12-27-22 @ 11:45)  Source: .Blood Blood-Peripheral  Preliminary Report (12-28-22 @ 15:09):    No growth to date.    Culture - Blood (collected 12-27-22 @ 11:40)  Source: .Blood Blood-Peripheral  Preliminary Report (12-28-22 @ 15:09):    No growth to date.       CXR improved b/l infiltrates    VITALS:  T(C): 36.7 (01-01-23 @ 04:53), Max: 36.7 (12-31-22 @ 21:28)  T(F): 98.1 (01-01-23 @ 04:53), Max: 98.1 (12-31-22 @ 21:28)  HR: 75 (01-01-23 @ 09:04) (64 - 83)  BP: 127/67 (01-01-23 @ 06:00) (125/66 - 150/61)  BP(mean): --  ABP: --  ABP(mean): --  RR: 16 (01-01-23 @ 06:00) (16 - 16)  SpO2: 95% (01-01-23 @ 09:04) (93% - 100%)  CVP(mm Hg): --  CVP(cm H2O): --    Ins and Outs     12-31-22 @ 07:01  -  01-01-23 @ 07:00  --------------------------------------------------------  IN: 720 mL / OUT: 300 mL / NET: 420 mL    01-01-23 @ 07:01  -  01-01-23 @ 12:28  --------------------------------------------------------  IN: 400 mL / OUT: 360 mL / NET: 40 mL                I&O's Detail    31 Dec 2022 07:01  -  01 Jan 2023 07:00  --------------------------------------------------------  IN:    Oral Fluid: 720 mL  Total IN: 720 mL    OUT:    Voided (mL): 300 mL  Total OUT: 300 mL    Total NET: 420 mL      01 Jan 2023 07:01  -  01 Jan 2023 12:28  --------------------------------------------------------  IN:    Oral Fluid: 400 mL  Total IN: 400 mL    OUT:    Voided (mL): 360 mL  Total OUT: 360 mL    Total NET: 40 mL

## 2023-01-02 LAB
ALBUMIN SERPL ELPH-MCNC: 2.3 G/DL — LOW (ref 3.3–5)
ALP SERPL-CCNC: 49 U/L — SIGNIFICANT CHANGE UP (ref 40–120)
ALT FLD-CCNC: 33 U/L — SIGNIFICANT CHANGE UP (ref 10–45)
ANION GAP SERPL CALC-SCNC: 7 MMOL/L — SIGNIFICANT CHANGE UP (ref 5–17)
AST SERPL-CCNC: 36 U/L — SIGNIFICANT CHANGE UP (ref 10–40)
BASOPHILS # BLD AUTO: 0.07 K/UL — SIGNIFICANT CHANGE UP (ref 0–0.2)
BASOPHILS NFR BLD AUTO: 0.5 % — SIGNIFICANT CHANGE UP (ref 0–2)
BILIRUB SERPL-MCNC: 0.5 MG/DL — SIGNIFICANT CHANGE UP (ref 0.2–1.2)
BUN SERPL-MCNC: 51 MG/DL — HIGH (ref 7–23)
CALCIUM SERPL-MCNC: 9 MG/DL — SIGNIFICANT CHANGE UP (ref 8.4–10.5)
CHLORIDE SERPL-SCNC: 100 MMOL/L — SIGNIFICANT CHANGE UP (ref 96–108)
CO2 SERPL-SCNC: 29 MMOL/L — SIGNIFICANT CHANGE UP (ref 22–31)
CREAT SERPL-MCNC: 1.35 MG/DL — HIGH (ref 0.5–1.3)
EGFR: 51 ML/MIN/1.73M2 — LOW
EOSINOPHIL # BLD AUTO: 0.08 K/UL — SIGNIFICANT CHANGE UP (ref 0–0.5)
EOSINOPHIL NFR BLD AUTO: 0.6 % — SIGNIFICANT CHANGE UP (ref 0–6)
GLUCOSE SERPL-MCNC: 119 MG/DL — HIGH (ref 70–99)
HCT VFR BLD CALC: 33.6 % — LOW (ref 39–50)
HGB BLD-MCNC: 11.3 G/DL — LOW (ref 13–17)
IMM GRANULOCYTES NFR BLD AUTO: 5.5 % — HIGH (ref 0–0.9)
LYMPHOCYTES # BLD AUTO: 1.97 K/UL — SIGNIFICANT CHANGE UP (ref 1–3.3)
LYMPHOCYTES # BLD AUTO: 15 % — SIGNIFICANT CHANGE UP (ref 13–44)
MCHC RBC-ENTMCNC: 32.4 PG — SIGNIFICANT CHANGE UP (ref 27–34)
MCHC RBC-ENTMCNC: 33.6 GM/DL — SIGNIFICANT CHANGE UP (ref 32–36)
MCV RBC AUTO: 96.3 FL — SIGNIFICANT CHANGE UP (ref 80–100)
MONOCYTES # BLD AUTO: 2 K/UL — HIGH (ref 0–0.9)
MONOCYTES NFR BLD AUTO: 15.3 % — HIGH (ref 2–14)
NEUTROPHILS # BLD AUTO: 8.26 K/UL — HIGH (ref 1.8–7.4)
NEUTROPHILS NFR BLD AUTO: 63.1 % — SIGNIFICANT CHANGE UP (ref 43–77)
NRBC # BLD: 0 /100 WBCS — SIGNIFICANT CHANGE UP (ref 0–0)
PLATELET # BLD AUTO: 244 K/UL — SIGNIFICANT CHANGE UP (ref 150–400)
POTASSIUM SERPL-MCNC: 4 MMOL/L — SIGNIFICANT CHANGE UP (ref 3.5–5.3)
POTASSIUM SERPL-SCNC: 4 MMOL/L — SIGNIFICANT CHANGE UP (ref 3.5–5.3)
PROT SERPL-MCNC: 6.8 G/DL — SIGNIFICANT CHANGE UP (ref 6–8.3)
RBC # BLD: 3.49 M/UL — LOW (ref 4.2–5.8)
RBC # FLD: 13.5 % — SIGNIFICANT CHANGE UP (ref 10.3–14.5)
SARS-COV-2 RNA SPEC QL NAA+PROBE: SIGNIFICANT CHANGE UP
SARS-COV-2 RNA SPEC QL NAA+PROBE: SIGNIFICANT CHANGE UP
SODIUM SERPL-SCNC: 136 MMOL/L — SIGNIFICANT CHANGE UP (ref 135–145)
WBC # BLD: 13.1 K/UL — HIGH (ref 3.8–10.5)
WBC # FLD AUTO: 13.1 K/UL — HIGH (ref 3.8–10.5)

## 2023-01-02 PROCEDURE — 99233 SBSQ HOSP IP/OBS HIGH 50: CPT

## 2023-01-02 RX ORDER — FUROSEMIDE 40 MG
40 TABLET ORAL
Refills: 0 | Status: DISCONTINUED | OUTPATIENT
Start: 2023-01-02 | End: 2023-01-03

## 2023-01-02 RX ADMIN — Medication 1 SPRAY(S): at 17:08

## 2023-01-02 RX ADMIN — Medication 50 MILLIGRAM(S): at 05:43

## 2023-01-02 RX ADMIN — Medication 1 SPRAY(S): at 17:09

## 2023-01-02 RX ADMIN — ALBUTEROL 2 PUFF(S): 90 AEROSOL, METERED ORAL at 08:10

## 2023-01-02 RX ADMIN — BUDESONIDE AND FORMOTEROL FUMARATE DIHYDRATE 2 PUFF(S): 160; 4.5 AEROSOL RESPIRATORY (INHALATION) at 08:11

## 2023-01-02 RX ADMIN — Medication 600 MILLIGRAM(S): at 17:09

## 2023-01-02 RX ADMIN — APIXABAN 2.5 MILLIGRAM(S): 2.5 TABLET, FILM COATED ORAL at 05:43

## 2023-01-02 RX ADMIN — Medication 200 MILLIGRAM(S): at 05:43

## 2023-01-02 RX ADMIN — SENNA PLUS 2 TABLET(S): 8.6 TABLET ORAL at 21:48

## 2023-01-02 RX ADMIN — Medication 40 MILLIGRAM(S): at 05:43

## 2023-01-02 RX ADMIN — Medication 200 MILLIGRAM(S): at 21:49

## 2023-01-02 RX ADMIN — Medication 600 MILLIGRAM(S): at 05:42

## 2023-01-02 RX ADMIN — TIOTROPIUM BROMIDE 2 PUFF(S): 18 CAPSULE ORAL; RESPIRATORY (INHALATION) at 08:10

## 2023-01-02 RX ADMIN — APIXABAN 2.5 MILLIGRAM(S): 2.5 TABLET, FILM COATED ORAL at 17:09

## 2023-01-02 RX ADMIN — CEFEPIME 100 MILLIGRAM(S): 1 INJECTION, POWDER, FOR SOLUTION INTRAMUSCULAR; INTRAVENOUS at 17:08

## 2023-01-02 RX ADMIN — Medication 1 SPRAY(S): at 23:57

## 2023-01-02 RX ADMIN — Medication 1 SPRAY(S): at 11:07

## 2023-01-02 RX ADMIN — Medication 200 MILLIGRAM(S): at 11:07

## 2023-01-02 RX ADMIN — Medication 25 MILLIGRAM(S): at 11:07

## 2023-01-02 RX ADMIN — Medication 81 MILLIGRAM(S): at 11:07

## 2023-01-02 RX ADMIN — Medication 1 SPRAY(S): at 05:53

## 2023-01-02 RX ADMIN — Medication 25 MILLIGRAM(S): at 21:49

## 2023-01-02 RX ADMIN — Medication 25 MILLIGRAM(S): at 05:43

## 2023-01-02 RX ADMIN — Medication 100 MILLIGRAM(S): at 11:07

## 2023-01-02 RX ADMIN — CEFEPIME 100 MILLIGRAM(S): 1 INJECTION, POWDER, FOR SOLUTION INTRAMUSCULAR; INTRAVENOUS at 05:42

## 2023-01-02 NOTE — CHART NOTE - NSCHARTNOTEFT_GEN_A_CORE
Nutrition Follow Up Note  Hospital Course   (Per Electronic Medical Record)    Source:  Patient [X]  Nursing Staff [X]   Medical Record [X]      Diet: Diet, Minced and Moist:   Consistent Carbohydrate {No Snacks}  DASH/TLC {Sodium & Cholesterol Restricted}  Supplement Feeding Modality:  Oral  Ensure Pudding Cans or Servings Per Day:  1       Frequency:  Two Times a day (22 @ 10:56) [Active]    Nutrition Follow Up: Patient with variable PO intakes, consuming 0-75% of meals at this time per nursing flow sheets. Receiving Ensure Pudding High Protein 4oz PO BID(Provides 480kcal & 24grams of Protein) to optimize nutritional status. Patient also receives BOOST oral nutritional supplements from outside hospital. Patient was on consistent CHO diet due to prednisone. Recommend liberalizing diet due to poor PO intakes.     Enteral/Parenteral Nutrition: Not Applicable    START [id number]    136  |  100  |  51<H>  ----------------------------<  119<H>  4.0   |  29  |  1.35<H>    Ca    9.0      2023 07:48    TPro  6.8  /  Alb  2.3<L>  /  TBili  0.5  /  DBili  x   /  AST  36  /  ALT  33  /  AlkPhos  49  -  END CHEMFISH  START MEDSACTIVEMEDICATIONS  (STANDING):  albuterol    90 MICROgram(s) HFA Inhaler 2 Puff(s) Inhalation every 6 hours  allopurinol 100 milliGRAM(s) Oral daily  apixaban 2.5 milliGRAM(s) Oral every 12 hours  aspirin enteric coated 81 milliGRAM(s) Oral daily  benzonatate 200 milliGRAM(s) Oral three times a day  budesonide 160 MICROgram(s)/formoterol 4.5 MICROgram(s) Inhaler 2 Puff(s) Inhalation two times a day  cefepime   IVPB      cefepime   IVPB 1000 milliGRAM(s) IV Intermittent every 12 hours  fluticasone propionate 50 MICROgram(s)/spray Nasal Spray 1 Spray(s) Both Nostrils two times a day  furosemide    Tablet 40 milliGRAM(s) Oral daily  guaiFENesin  milliGRAM(s) Oral every 12 hours  hydrALAZINE 25 milliGRAM(s) Oral every 8 hours  metoprolol succinate ER 50 milliGRAM(s) Oral daily  senna 2 Tablet(s) Oral at bedtime  sodium chloride 0.65% Nasal 1 Spray(s) Both Nostrils every 6 hours  tiotropium 2.5 MICROgram(s) Inhaler 2 Puff(s) Inhalation daily    MEDICATIONS  (PRN):  bisacodyl 5 milliGRAM(s) Oral every 12 hours PRN Constipation  Eucerin Cream 1 Application(s) 1 Applicatorful Topical four times a day PRN dry/itchy skin  hemorrhoidal Ointment 1 Application(s) Rectal two times a day PRN rectal itching/ pain  END MEDSACTIVE  START DIETORDEREND DIETORDER  START ADMITDXHeart failure    END ADMITDX  START IOFS  END IOFS  START SKINPUEND SKINPU    Pertinent Medications: MEDICATIONS  (STANDING):  albuterol    90 MICROgram(s) HFA Inhaler 2 Puff(s) Inhalation every 6 hours  allopurinol 100 milliGRAM(s) Oral daily  apixaban 2.5 milliGRAM(s) Oral every 12 hours  aspirin enteric coated 81 milliGRAM(s) Oral daily  benzonatate 200 milliGRAM(s) Oral three times a day  budesonide 160 MICROgram(s)/formoterol 4.5 MICROgram(s) Inhaler 2 Puff(s) Inhalation two times a day  cefepime   IVPB      cefepime   IVPB 1000 milliGRAM(s) IV Intermittent every 12 hours  fluticasone propionate 50 MICROgram(s)/spray Nasal Spray 1 Spray(s) Both Nostrils two times a day  furosemide    Tablet 40 milliGRAM(s) Oral daily  guaiFENesin  milliGRAM(s) Oral every 12 hours  hydrALAZINE 25 milliGRAM(s) Oral every 8 hours  metoprolol succinate ER 50 milliGRAM(s) Oral daily  senna 2 Tablet(s) Oral at bedtime  sodium chloride 0.65% Nasal 1 Spray(s) Both Nostrils every 6 hours  tiotropium 2.5 MICROgram(s) Inhaler 2 Puff(s) Inhalation daily    MEDICATIONS  (PRN):  bisacodyl 5 milliGRAM(s) Oral every 12 hours PRN Constipation  Eucerin Cream 1 Application(s) 1 Applicatorful Topical four times a day PRN dry/itchy skin  hemorrhoidal Ointment 1 Application(s) Rectal two times a day PRN rectal itching/ pain      Pertinent Labs:   Na136 mmol/L Glu 119 mg/dL<H> K+ 4.0 mmol/L Cr  1.35 mg/dL<H> BUN 51 mg/dL<H>  Phos 4.1 mg/dL  Alb 2.3 g/dL<L>          Weight Trends:  Weight in k.6 (27 Dec 2022 05:05)      Skin: ecchymotic    Edema: +3 edema noted to right/left leg    Last Bowel Movement: 23    Estimated Needs:   [X] No Change Since Previous Assessment    Previous Nutrition Diagnosis:   Moderate Malnutrition      Interventions:   1. Recommend discontinuing consistent CHO diet  2. Monitor PO intakes and provide assistance with meals PRN  3. Continue with Ensure Pudding High Protein 4oz PO TID(Provides 720kcal & 36grams of Protein) and BOOST supplements brought from outside hospital  4. Encourage PO Intakes    Monitoring & Evaluation:   [X] Weights   [X] PO Intake   [X] Skin Integrity   [X] Follow Up (Per Protocol)  [X] Tolerance to Diet Prescription   [X] Other: Labs & POCT    Registered Dietitian/Nutritionist Remains Available.  Grant Tyson RD, CDN    Phone# [phone]

## 2023-01-02 NOTE — OCCUPATIONAL THERAPY INITIAL EVALUATION ADULT - PERTINENT HX OF CURRENT PROBLEM, REHAB EVAL
88 year old male with a PMH of CVA, CHF, HTN, afib on eliquis, hip fx s/p fall and prior intubation for HF exacerbation presenting to [facility] ED for SOB and CP admitted to ICU for acute hypoxix respiratory failure, flash pulmonary edema and acute CHF exacerbation also found to be Flu A positive now downgraded to medical floors

## 2023-01-02 NOTE — OCCUPATIONAL THERAPY INITIAL EVALUATION ADULT - GENERAL OBSERVATIONS, REHAB EVAL
Pt received OOB in bedside chair, in NAD, on RA, agreeable to OT session. Pt left OOB in bedside chair, in NAD, on RA, +chair alarm.

## 2023-01-02 NOTE — PROGRESS NOTE ADULT - ASSESSMENT
88 year old male with a PMH of CVA, CHF, HTN, afib on eliquis, hip fx s/p fall and prior intubation for HF exacerbation presenting to [facility] ED for SOB and CP admitted to ICU for acute hypoxix respiratory failure, flash pulmonary edema and acute CHF exacerbation also found to be Flu A positive now downgraded to medical floors    #acute hypoxic respiratory failure  #multifocal PNA  #influenza A  - s/p BiPAP in the ICU and IV lasix  - titrated off o2 and now on RA- saturating well  - continue steroid taper- now on prednisone 10mg x4 days  - CT chest showed multifocal PNA  - CXR 12/31 still with infiltrates but better than before  - continue cefepime- course as per ID, day 6 (favor 5-7 days of IV abx as per ID)  - s/p tamiflu treatment  - strept and legionella urine antigen negative, MRSA nasal PCR negative  - continue symbicort, nasal spray  - patient does not like duoneb- will change to albuterol inhaler and tiotropium  - continue chest PT   - ID following, recs appreciated  - pulm following, recs appreciated- discussed with Dr. Hernandez    #Acute Pulmonary Edema  #Hypertensive Urgency with Acute on Chronic Combined CHF  - s/p nitro gtt for hypertensive urgency  - Hold ARB due to renal function  - continue daily lasix 40mg qd - follow renal fxn  - continue hydralazine 25 mg q8h, metoprolol succ 50 mg daily  - cardiac follow up to optimize meds  - monitor volume status, daily weights, I/Os, replete lytes as needed    #Elevated Troponin  #NSTEMI, likely type II  - likely demand ischemia  - cardiology appreciated  - cont medical management, cont asa, eliquis, bb    #IRINEO on CKD 3  - hold ARB  - avoid nephrotoxins  - renally adjusted meds  - monitor BMP  - appreciate Renal consult - d/w Dr. Pineda. Cont lasix for now- May need to accept a higher creatinine.      #Chronic Atrial Fibrillation  - continue metoprolol for rate control, eliquis for stroke ppx    #History of CVA  - continue ASA    #DVT PPx  - eliquis adjusted to 2.5 BID for renal fxn    PT- recommend acute rehab, if not accepted will go to [facility] as family unable to care for patient at this time    patient prefers to update family on his own

## 2023-01-02 NOTE — PROGRESS NOTE ADULT - ASSESSMENT
Physical Examination:  GENERAL:               Alert,  no acute distress.    HEENT:                    no JVD, Moist MM  PULM:                     Bilateral air entry, Clear to auscultation bilaterally, no significant sputum production, no Rales, No Rhonchi, no Wheezing  CVS:                         S1, S2,  +Murmur  ABD:                        Soft, nondistended, nontender, normoactive bowel sounds,   EXT:                         mild edema, nontender, No Cyanosis or Clubbing   NEURO:                  Alert, oriented, interactive, nonfocal, follows commands  PSYC:                      Calm, + Insight.        88 year old male with a PMH of CVA, CHF, HTN, afib on eliquis, hip fx s/p fall and prior intubation for HF exacerbation presenting to [facility] ED for SOB and CP. Pt noted to be hypoxemic with elevated sbp to 200's. Pt tx with NIV, lasix and nitro with +response. ICU team consulted. Influenza A + with mild elevated troponin, elevated bnp and ?IRINEO on labs. When we arrived pt was alert and oriented, with O2 sat 100%, rate controlled afib on monitor and sbp 140. Pt trial off bipap with NC ~4 liters without desaturation or respiratory distress. ABG obtained with the following results: 7.37 /  45 / 87 / 26 97% on 4 liters NC. Troponin mildly elevated and has stabilized with no major change and ekg is without acute ischemia noted currently appearing to be demand ischemia at this time (also he is on AC at home), cardiology contacted by ED MD. BNP elevated to 6k. Given pt appears comfortable and states that he is "feeling better" along with his neurological, respiratory (off NIV) and hemodynamic stability  he is not currently a candidate for ICU and is appropriate for monitoring on telemetry under medicine service. However, if any changes should occur please contact our team immediately. Thank you. Recommending the following:    Assessment  1. Influenza A now with multifocal pna  2. 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 Elevated trop, r/o NSTEMI  4. Underlying Afib on Eliquis, HTN, CVA,   5. Increasing Cr / IRINEO normal baseline    Plan   Monitor on ROOM air  Finish course of antibiotics as per ID in am   Continue Symbicort, duoneb  Oral diet  prednisone taper to off as ordered  ASA, A/c with Eliquis     case d/w Dr. Aguilar   Dispo planning   Goals of Care: Full code

## 2023-01-02 NOTE — PROGRESS NOTE ADULT - SUBJECTIVE AND OBJECTIVE BOX
Patient is a 88y old  Male who presents with a chief complaint of shortness of breath (01 Jan 2023 15:57)    Patient seen and examined at bedside. No events overnight. Currently on room air. Patient feels better today, cough improved and breathing better. Denies chest pain, abd pain.    ALLERGIES:  No Known Allergies    MEDICATIONS  (STANDING):  albuterol    90 MICROgram(s) HFA Inhaler 2 Puff(s) Inhalation every 6 hours  allopurinol 100 milliGRAM(s) Oral daily  apixaban 2.5 milliGRAM(s) Oral every 12 hours  aspirin enteric coated 81 milliGRAM(s) Oral daily  benzonatate 200 milliGRAM(s) Oral three times a day  budesonide 160 MICROgram(s)/formoterol 4.5 MICROgram(s) Inhaler 2 Puff(s) Inhalation two times a day  cefepime   IVPB      cefepime   IVPB 1000 milliGRAM(s) IV Intermittent every 12 hours  fluticasone propionate 50 MICROgram(s)/spray Nasal Spray 1 Spray(s) Both Nostrils two times a day  furosemide    Tablet 40 milliGRAM(s) Oral daily  guaiFENesin  milliGRAM(s) Oral every 12 hours  hydrALAZINE 25 milliGRAM(s) Oral every 8 hours  metoprolol succinate ER 50 milliGRAM(s) Oral daily  senna 2 Tablet(s) Oral at bedtime  sodium chloride 0.65% Nasal 1 Spray(s) Both Nostrils every 6 hours  tiotropium 2.5 MICROgram(s) Inhaler 2 Puff(s) Inhalation daily    MEDICATIONS  (PRN):  bisacodyl 5 milliGRAM(s) Oral every 12 hours PRN Constipation  Eucerin Cream 1 Application(s) 1 Applicatorful Topical four times a day PRN dry/itchy skin  hemorrhoidal Ointment 1 Application(s) Rectal two times a day PRN rectal itching/ pain    Vital Signs Last 24 Hrs  T(F): 98.3 (02 Jan 2023 05:00), Max: 98.3 (02 Jan 2023 05:00)  HR: 58 (02 Jan 2023 08:12) (58 - 85)  BP: 145/63 (02 Jan 2023 05:00) (139/59 - 157/72)  RR: 16 (02 Jan 2023 05:00) (16 - 18)  SpO2: 96% (02 Jan 2023 08:12) (94% - 96%)  I&O's Summary    01 Jan 2023 07:01  -  02 Jan 2023 07:00  --------------------------------------------------------  IN: 1280 mL / OUT: 1160 mL / NET: 120 mL    02 Jan 2023 07:01  -  02 Jan 2023 11:02  --------------------------------------------------------  IN: 400 mL / OUT: 0 mL / NET: 400 mL        PHYSICAL EXAM:  GENERAL: NAD, sitting in chair  HEAD:  Atraumatic, Normocephalic  EYES: conjunctiva and sclera clear  ENMT: Moist mucous membranes, Good dentition, no thrush  CHEST/LUNG: diminished throughout, wheezing auscultated with rhonchi throughout   HEART: RRR; S1/S2, No murmur  ABDOMEN: Soft, Nontender, Nondistended; Bowel sounds present  EXTREMITIES: No calf tenderness, No cyanosis, 1+ pitting edema b/l LE (improving)  SKIN: Warm, perfused  PSYCH: Normal mood, Normal affect    LABS:                        11.3   13.10 )-----------( 244      ( 02 Jan 2023 07:48 )             33.6     01-02    136  |  100  |  51  ----------------------------<  119  4.0   |  29  |  1.35    Ca    9.0      02 Jan 2023 07:48    TPro  6.8  /  Alb  2.3  /  TBili  0.5  /  DBili  x   /  AST  36  /  ALT  33  /  AlkPhos  49  01-02                                    Culture - Blood (collected 27 Dec 2022 11:45)  Source: .Blood Blood-Peripheral  Final Report (01 Jan 2023 15:00):    No Growth Final    Culture - Blood (collected 27 Dec 2022 11:40)  Source: .Blood Blood-Peripheral  Final Report (01 Jan 2023 15:01):    No Growth Final          RADIOLOGY & ADDITIONAL TESTS:    Care Discussed with Consultants/Other Providers:

## 2023-01-02 NOTE — OCCUPATIONAL THERAPY INITIAL EVALUATION ADULT - MD ORDER
MD orders for OT received, chart reviewed and contents noted. RN consulted prior to session, stated pt OK to participate.

## 2023-01-02 NOTE — PROGRESS NOTE ADULT - SUBJECTIVE AND OBJECTIVE BOX
Follow-up Pulmonary Progress Note  Chief Complaint : Heart failure          No new events overnight.  Denies SOB/CP.   remains on room air  complain of LE weakness  respiratory status stable.       Allergies :No Known Allergies      PAST MEDICAL & SURGICAL HISTORY:  Afib    Congestive heart failure (CHF)    CVA (cerebral vascular accident)    Hypertension, unspecified type    No significant past surgical history        Medications:  MEDICATIONS  (STANDING):  albuterol    90 MICROgram(s) HFA Inhaler 2 Puff(s) Inhalation every 6 hours  allopurinol 100 milliGRAM(s) Oral daily  apixaban 2.5 milliGRAM(s) Oral every 12 hours  aspirin enteric coated 81 milliGRAM(s) Oral daily  benzonatate 200 milliGRAM(s) Oral three times a day  budesonide 160 MICROgram(s)/formoterol 4.5 MICROgram(s) Inhaler 2 Puff(s) Inhalation two times a day  cefepime   IVPB      cefepime   IVPB 1000 milliGRAM(s) IV Intermittent every 12 hours  fluticasone propionate 50 MICROgram(s)/spray Nasal Spray 1 Spray(s) Both Nostrils two times a day  furosemide    Tablet 40 milliGRAM(s) Oral daily  guaiFENesin  milliGRAM(s) Oral every 12 hours  hydrALAZINE 25 milliGRAM(s) Oral every 8 hours  metoprolol succinate ER 50 milliGRAM(s) Oral daily  senna 2 Tablet(s) Oral at bedtime  sodium chloride 0.65% Nasal 1 Spray(s) Both Nostrils every 6 hours  tiotropium 2.5 MICROgram(s) Inhaler 2 Puff(s) Inhalation daily    MEDICATIONS  (PRN):  bisacodyl 5 milliGRAM(s) Oral every 12 hours PRN Constipation  Eucerin Cream 1 Application(s) 1 Applicatorful Topical four times a day PRN dry/itchy skin  hemorrhoidal Ointment 1 Application(s) Rectal two times a day PRN rectal itching/ pain      Antibiotics History  cefepime   IVPB    , 12-27-22 @ 15:37  cefepime   IVPB 1000 milliGRAM(s) IV Intermittent once, 12-27-22 @ 14:00  cefepime   IVPB 1000 milliGRAM(s) IV Intermittent every 12 hours, 12-28-22 @ 06:00  cefTRIAXone   IVPB    , 12-27-22 @ 07:30  cefTRIAXone   IVPB 1000 milliGRAM(s) IV Intermittent once, 12-27-22 @ 07:30, Stop order after: 1 Doses  cefTRIAXone   IVPB 1000 milliGRAM(s) IV Intermittent every 24 hours, 12-28-22 @ 07:30, Stop order after: 7 Days  oseltamivir 75 milliGRAM(s) Oral once, 12-18-22 @ 20:55, Stop order after: 1 Doses  oseltamivir 30 milliGRAM(s) Oral every 12 hours, 12-19-22 @ 09:00, Stop order after: 4 Days  oseltamivir 30 milliGRAM(s) Oral daily, 12-23-22 @ 00:00, Stop order after: 1 Doses      Heme Medications   apixaban 2.5 milliGRAM(s) Oral every 12 hours, 12-22-22 @ 13:59  aspirin enteric coated 81 milliGRAM(s) Oral daily, 12-17-22 @ 11:33      GI Medications  bisacodyl 5 milliGRAM(s) Oral every 12 hours, 12-29-22 @ 14:59, Routine PRN  senna 2 Tablet(s) Oral at bedtime, 12-17-22 @ 11:34, Routine        LABS:                        11.3   13.10 )-----------( 244      ( 02 Jan 2023 07:48 )             33.6     01-02    136  |  100  |  51<H>  ----------------------------<  119<H>  4.0   |  29  |  1.35<H>    Ca    9.0      02 Jan 2023 07:48    TPro  6.8  /  Alb  2.3<L>  /  TBili  0.5  /  DBili  x   /  AST  36  /  ALT  33  /  AlkPhos  49  01-02 01-02-23 @ 07:48   -  13.10<H>  01-01-23 @ 06:30   -  14.13<H>  12-31-22 @ 07:20   -  12.88<H>         CULTURES: (if applicable)    Culture - Blood (collected 12-27-22 @ 11:45)  Source: .Blood Blood-Peripheral  Final Report (01-01-23 @ 15:00):    No Growth Final    Culture - Blood (collected 12-27-22 @ 11:40)  Source: .Blood Blood-Peripheral  Final Report (01-01-23 @ 15:01):    No Growth Final         VITALS:  T(C): 36.8 (01-02-23 @ 05:00), Max: 36.8 (01-02-23 @ 05:00)  T(F): 98.3 (01-02-23 @ 05:00), Max: 98.3 (01-02-23 @ 05:00)  HR: 89 (01-02-23 @ 10:31) (58 - 89)  BP: 128/64 (01-02-23 @ 12:00) (128/64 - 157/72)  BP(mean): --  ABP: --  ABP(mean): --  RR: 16 (01-02-23 @ 05:00) (16 - 18)  SpO2: 95% (01-02-23 @ 10:31) (94% - 96%)  CVP(mm Hg): --  CVP(cm H2O): --    Ins and Outs     01-01-23 @ 07:01  -  01-02-23 @ 07:00  --------------------------------------------------------  IN: 1280 mL / OUT: 1160 mL / NET: 120 mL    01-02-23 @ 07:01  -  01-02-23 @ 12:41  --------------------------------------------------------  IN: 400 mL / OUT: 0 mL / NET: 400 mL                I&O's Detail    01 Jan 2023 07:01  -  02 Jan 2023 07:00  --------------------------------------------------------  IN:    Oral Fluid: 1280 mL  Total IN: 1280 mL    OUT:    Voided (mL): 1160 mL  Total OUT: 1160 mL    Total NET: 120 mL      02 Jan 2023 07:01  -  02 Jan 2023 12:41  --------------------------------------------------------  IN:    Oral Fluid: 400 mL  Total IN: 400 mL    OUT:  Total OUT: 0 mL    Total NET: 400 mL

## 2023-01-02 NOTE — PROGRESS NOTE ADULT - SUBJECTIVE AND OBJECTIVE BOX
No distress    Vital Signs Last 24 Hrs  T(C): 36.6 (01-02-23 @ 21:46), Max: 36.8 (01-02-23 @ 05:00)  T(F): 97.9 (01-02-23 @ 21:46), Max: 98.3 (01-02-23 @ 05:00)  HR: 60 (01-02-23 @ 21:46) (58 - 89)  BP: 135/54 (01-02-23 @ 21:46) (128/64 - 145/63)  RR: 18 (01-02-23 @ 21:46) (16 - 18)  SpO2: 97% (01-02-23 @ 21:46) (94% - 97%)    I&O's Detail    01 Jan 2023 07:01  -  02 Jan 2023 07:00  --------------------------------------------------------  IN:    Oral Fluid: 1280 mL  Total IN: 1280 mL    OUT:    Voided (mL): 1160 mL  Total OUT: 1160 mL    Total NET: 120 mL    s1s2  b/l air entry  soft, ND  + edema, stasis changes                                                11.3   13.10 )-----------( 244      ( 02 Jan 2023 07:48 )             33.6     02 Jan 2023 07:48    136    |  100    |  51     ----------------------------<  119    4.0     |  29     |  1.35     Ca    9.0        02 Jan 2023 07:48    TPro  6.8    /  Alb  2.3    /  TBili  0.5    /  DBili  x      /  AST  36     /  ALT  33     /  AlkPhos  49     02 Jan 2023 07:48    LIVER FUNCTIONS - ( 02 Jan 2023 07:48 )  Alb: 2.3 g/dL / Pro: 6.8 g/dL / ALK PHOS: 49 U/L / ALT: 33 U/L / AST: 36 U/L / GGT: x           albuterol    90 MICROgram(s) HFA Inhaler 2 Puff(s) Inhalation every 6 hours  allopurinol 100 milliGRAM(s) Oral daily  apixaban 2.5 milliGRAM(s) Oral every 12 hours  aspirin enteric coated 81 milliGRAM(s) Oral daily  benzonatate 200 milliGRAM(s) Oral three times a day  bisacodyl 5 milliGRAM(s) Oral every 12 hours PRN  budesonide 160 MICROgram(s)/formoterol 4.5 MICROgram(s) Inhaler 2 Puff(s) Inhalation two times a day  cefepime   IVPB      cefepime   IVPB 1000 milliGRAM(s) IV Intermittent every 12 hours  Eucerin Cream 1 Application(s) 1 Applicatorful Topical four times a day PRN  fluticasone propionate 50 MICROgram(s)/spray Nasal Spray 1 Spray(s) Both Nostrils two times a day  furosemide    Tablet 40 milliGRAM(s) Oral daily  guaiFENesin  milliGRAM(s) Oral every 12 hours  hemorrhoidal Ointment 1 Application(s) Rectal two times a day PRN  hydrALAZINE 25 milliGRAM(s) Oral every 8 hours  metoprolol succinate ER 50 milliGRAM(s) Oral daily  senna 2 Tablet(s) Oral at bedtime  sodium chloride 0.65% Nasal 1 Spray(s) Both Nostrils every 6 hours  tiotropium 2.5 MICROgram(s) Inhaler 2 Puff(s) Inhalation daily    A/P:    Flu A, b/l PNA  CM, mod MR, TR, EF 45-50%  Hemodynamic IRINEO/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

## 2023-01-02 NOTE — OCCUPATIONAL THERAPY INITIAL EVALUATION ADULT - ADDITIONAL COMMENTS
Pt lives with his children in a private home with no ANDIE, no steps inside. Pt owns a stall shower with a shower chair and a commode. Pt also owns grab bars in his shower. Pt independent with use of a RW.

## 2023-01-02 NOTE — PROGRESS NOTE ADULT - SUBJECTIVE AND OBJECTIVE BOX
CC: f/u for pneumonia    Patient reports his legs hurt    REVIEW OF SYSTEMS:  All other review of systems negative (Comprehensive ROS)    Antimicrobials Day #  :6/7  cefepime   IVPB      cefepime   IVPB 1000 milliGRAM(s) IV Intermittent every 12 hours    Other Medications Reviewed    T(F): 98.3 (01-02-23 @ 05:00), Max: 98.3 (01-02-23 @ 05:00)  HR: 89 (01-02-23 @ 10:31)  BP: 128/64 (01-02-23 @ 12:00)  RR: 16 (01-02-23 @ 05:00)  SpO2: 95% (01-02-23 @ 10:31)  Wt(kg): --    PHYSICAL EXAM:  General: alert, no acute distress  Eyes:  anicteric, no conjunctival injection, no discharge  Oropharynx: no lesions or injection 	  Neck: supple, without adenopathy  Lungs: rales and bronchial bs both bases to auscultation  Heart: regular rate and rhythm; no murmur, rubs or gallops  Abdomen: soft, nondistended, nontender, without mass or organomegaly  Skin: no lesions  Extremities: no clubbing, cyanosis, . mild bilateral  edema  Neurologic: alert, oriented, moves all extremities    LAB RESULTS:                        11.3   13.10 )-----------( 244      ( 02 Jan 2023 07:48 )             33.6     01-02    136  |  100  |  51<H>  ----------------------------<  119<H>  4.0   |  29  |  1.35<H>    Ca    9.0      02 Jan 2023 07:48    TPro  6.8  /  Alb  2.3<L>  /  TBili  0.5  /  DBili  x   /  AST  36  /  ALT  33  /  AlkPhos  49  01-02    LIVER FUNCTIONS - ( 02 Jan 2023 07:48 )  Alb: 2.3 g/dL / Pro: 6.8 g/dL / ALK PHOS: 49 U/L / ALT: 33 U/L / AST: 36 U/L / GGT: x             MICROBIOLOGY:  RECENT CULTURES:      RADIOLOGY REVIEWED:    < from: CT Chest No Cont (12.27.22 @ 08:57) >    ACC: [id number] EXAM:  CT CHEST                          PROCEDURE DATE:  12/27/2022          INTERPRETATION:  INDICATION: pneumonia, leukocytosis  TECHNIQUE: Unenhanced CT of the chest. Coronal, sagittal, and MIP images   were reconstructed and reviewed.  COMPARISON: Extremity 2021 chest CT.    FINDINGS:    AIRWAYS, LUNGS and PLEURA: Patent central airways. Consolidations within   bilateral lower lobes and patchy/nodular opacities within all lobes   likely representing multifocal pneumonia. Small left pleural effusion.    LYMPH NODES, MEDIASTINUM AND YANIQUE: Stable enlarged mediastinal lymph   nodes measuring up to 3.5 x 2.5 cm within subcarinal region.    HEART AND VESSELS: Cardiomegaly. No pericardial effusion. Stable   calcified densities along the right lateral margin of the aortic root.   Thoracic aorta normal in diameter. Dilated pulmonary arteries. Coronary   calcifications.    VISUALIZED UPPER ABDOMEN: Small calcified nodules within the liver   parenchyma. Cholelithiasis. Bilateralrenal cysts.    CHEST WALL AND BONES: No aggressive osseous lesion. Advanced   osteoarthritis of the glenohumeral joints, left greater than the right.   Old fracture deformity of bilateral ribs. Osteoporosis.    LOWER NECK: Within normal limits.    IMPRESSION:    Consolidations within bilateral lower lobes and patchy/nodular opacities   within all lobes likely representing multifocal pneumonia. Small left   pleural effusion.    --- End of Report ---            YARON THOMAS MD; Attending Radiologist    < end of copied text >            Assessment:  Elderly man with h/o cva , htn, admitted with sob, very hypertensive,nstemi,  required diuresis, nitro drip, tested positive for flu and had tamiflu , steroids. WBC higher and feeling sob still with ct showing multifocal pneumonia most in the bases so now on cefepime for superimposed bacterial pneumonia. neg legionella and mrsa   Plan:  anticipate 1 more day of cefepime.  pulmonary toilet, steroids per pulmonary  elevate legs CC: f/u for pneumonia    Patient reports his legs hurt    REVIEW OF SYSTEMS:  All other review of systems negative (Comprehensive ROS)    Antimicrobials Day #  :6/7  cefepime   IVPB      cefepime   IVPB 1000 milliGRAM(s) IV Intermittent every 12 hours    Other Medications Reviewed    T(F): 98.3 (01-02-23 @ 05:00), Max: 98.3 (01-02-23 @ 05:00)  HR: 89 (01-02-23 @ 10:31)  BP: 128/64 (01-02-23 @ 12:00)  RR: 16 (01-02-23 @ 05:00)  SpO2: 95% (01-02-23 @ 10:31)  Wt(kg): --    PHYSICAL EXAM:  General: alert, no acute distress  Eyes:  anicteric, no conjunctival injection, no discharge  Oropharynx: no lesions or injection 	  Neck: supple, without adenopathy  Lungs: rales and bronchial bs both bases to auscultation  Heart: regular rate and rhythm; no murmur, rubs or gallops  Abdomen: soft, nondistended, nontender, without mass or organomegaly  Skin: no lesions  Extremities: no clubbing, cyanosis, . mild bilateral  edema  Neurologic: alert, oriented, moves all extremities    LAB RESULTS:                        11.3   13.10 )-----------( 244      ( 02 Jan 2023 07:48 )             33.6     01-02    136  |  100  |  51<H>  ----------------------------<  119<H>  4.0   |  29  |  1.35<H>    Ca    9.0      02 Jan 2023 07:48    TPro  6.8  /  Alb  2.3<L>  /  TBili  0.5  /  DBili  x   /  AST  36  /  ALT  33  /  AlkPhos  49  01-02    LIVER FUNCTIONS - ( 02 Jan 2023 07:48 )  Alb: 2.3 g/dL / Pro: 6.8 g/dL / ALK PHOS: 49 U/L / ALT: 33 U/L / AST: 36 U/L / GGT: x             MICROBIOLOGY:  RECENT CULTURES:      RADIOLOGY REVIEWED:    < from: CT Chest No Cont (12.27.22 @ 08:57) >    ACC: [id number] EXAM:  CT CHEST                          PROCEDURE DATE:  12/27/2022          INTERPRETATION:  INDICATION: pneumonia, leukocytosis  TECHNIQUE: Unenhanced CT of the chest. Coronal, sagittal, and MIP images   were reconstructed and reviewed.  COMPARISON: Extremity 2021 chest CT.    FINDINGS:    AIRWAYS, LUNGS and PLEURA: Patent central airways. Consolidations within   bilateral lower lobes and patchy/nodular opacities within all lobes   likely representing multifocal pneumonia. Small left pleural effusion.    LYMPH NODES, MEDIASTINUM AND YANIQUE: Stable enlarged mediastinal lymph   nodes measuring up to 3.5 x 2.5 cm within subcarinal region.    HEART AND VESSELS: Cardiomegaly. No pericardial effusion. Stable   calcified densities along the right lateral margin of the aortic root.   Thoracic aorta normal in diameter. Dilated pulmonary arteries. Coronary   calcifications.    VISUALIZED UPPER ABDOMEN: Small calcified nodules within the liver   parenchyma. Cholelithiasis. Bilateralrenal cysts.    CHEST WALL AND BONES: No aggressive osseous lesion. Advanced   osteoarthritis of the glenohumeral joints, left greater than the right.   Old fracture deformity of bilateral ribs. Osteoporosis.    LOWER NECK: Within normal limits.    IMPRESSION:    Consolidations within bilateral lower lobes and patchy/nodular opacities   within all lobes likely representing multifocal pneumonia. Small left   pleural effusion.    --- End of Report ---            YARON THOMAS MD; Attending Radiologist    < end of copied text >            Assessment:  Elderly man with h/o cva , htn, admitted with sob, very hypertensive,nstemi,  required diuresis, nitro drip, tested positive for flu and had tamiflu , steroids. WBC higher and feeling sob still with ct showing multifocal pneumonia most in the bases so now on cefepime for superimposed bacterial pneumonia. neg legionella and mrsa   Plan:  anticipate 1 more day of cefepime.  pulmonary toilet, now off steroids  elevate legs

## 2023-01-03 ENCOUNTER — TRANSCRIPTION ENCOUNTER (OUTPATIENT)
Age: 88
End: 2023-01-03

## 2023-01-03 ENCOUNTER — INPATIENT (INPATIENT)
Facility: HOSPITAL | Age: 88
LOS: 8 days | Discharge: HOME HEALTH | DRG: 949 | End: 2023-01-12
Attending: PHYSICAL MEDICINE & REHABILITATION | Admitting: PHYSICAL MEDICINE & REHABILITATION
Payer: MEDICARE

## 2023-01-03 VITALS
WEIGHT: 162.48 LBS | OXYGEN SATURATION: 95 % | HEIGHT: 66 IN | SYSTOLIC BLOOD PRESSURE: 133 MMHG | RESPIRATION RATE: 16 BRPM | DIASTOLIC BLOOD PRESSURE: 67 MMHG | HEART RATE: 63 BPM | TEMPERATURE: 98 F

## 2023-01-03 VITALS — OXYGEN SATURATION: 98 %

## 2023-01-03 DIAGNOSIS — I50.9 HEART FAILURE, UNSPECIFIED: ICD-10-CM

## 2023-01-03 LAB
ANION GAP SERPL CALC-SCNC: 7 MMOL/L — SIGNIFICANT CHANGE UP (ref 5–17)
BUN SERPL-MCNC: 47 MG/DL — HIGH (ref 7–23)
CALCIUM SERPL-MCNC: 8.7 MG/DL — SIGNIFICANT CHANGE UP (ref 8.4–10.5)
CHLORIDE SERPL-SCNC: 102 MMOL/L — SIGNIFICANT CHANGE UP (ref 96–108)
CO2 SERPL-SCNC: 29 MMOL/L — SIGNIFICANT CHANGE UP (ref 22–31)
CREAT SERPL-MCNC: 1.19 MG/DL — SIGNIFICANT CHANGE UP (ref 0.5–1.3)
EGFR: 59 ML/MIN/1.73M2 — LOW
GLUCOSE SERPL-MCNC: 92 MG/DL — SIGNIFICANT CHANGE UP (ref 70–99)
HCT VFR BLD CALC: 32.4 % — LOW (ref 39–50)
HGB BLD-MCNC: 10.7 G/DL — LOW (ref 13–17)
MCHC RBC-ENTMCNC: 31.3 PG — SIGNIFICANT CHANGE UP (ref 27–34)
MCHC RBC-ENTMCNC: 33 GM/DL — SIGNIFICANT CHANGE UP (ref 32–36)
MCV RBC AUTO: 94.7 FL — SIGNIFICANT CHANGE UP (ref 80–100)
NRBC # BLD: 0 /100 WBCS — SIGNIFICANT CHANGE UP (ref 0–0)
PLATELET # BLD AUTO: 234 K/UL — SIGNIFICANT CHANGE UP (ref 150–400)
POTASSIUM SERPL-MCNC: 4 MMOL/L — SIGNIFICANT CHANGE UP (ref 3.5–5.3)
POTASSIUM SERPL-SCNC: 4 MMOL/L — SIGNIFICANT CHANGE UP (ref 3.5–5.3)
RBC # BLD: 3.42 M/UL — LOW (ref 4.2–5.8)
RBC # FLD: 13.6 % — SIGNIFICANT CHANGE UP (ref 10.3–14.5)
SODIUM SERPL-SCNC: 138 MMOL/L — SIGNIFICANT CHANGE UP (ref 135–145)
WBC # BLD: 12.65 K/UL — HIGH (ref 3.8–10.5)
WBC # FLD AUTO: 12.65 K/UL — HIGH (ref 3.8–10.5)

## 2023-01-03 PROCEDURE — 96374 THER/PROPH/DIAG INJ IV PUSH: CPT

## 2023-01-03 PROCEDURE — 87637 SARSCOV2&INF A&B&RSV AMP PRB: CPT

## 2023-01-03 PROCEDURE — 87899 AGENT NOS ASSAY W/OPTIC: CPT

## 2023-01-03 PROCEDURE — 96375 TX/PRO/DX INJ NEW DRUG ADDON: CPT

## 2023-01-03 PROCEDURE — 81001 URINALYSIS AUTO W/SCOPE: CPT

## 2023-01-03 PROCEDURE — 94660 CPAP INITIATION&MGMT: CPT

## 2023-01-03 PROCEDURE — 80048 BASIC METABOLIC PNL TOTAL CA: CPT

## 2023-01-03 PROCEDURE — 85025 COMPLETE CBC W/AUTO DIFF WBC: CPT

## 2023-01-03 PROCEDURE — 71250 CT THORAX DX C-: CPT

## 2023-01-03 PROCEDURE — 87641 MR-STAPH DNA AMP PROBE: CPT

## 2023-01-03 PROCEDURE — 84145 PROCALCITONIN (PCT): CPT

## 2023-01-03 PROCEDURE — 84100 ASSAY OF PHOSPHORUS: CPT

## 2023-01-03 PROCEDURE — 80053 COMPREHEN METABOLIC PANEL: CPT

## 2023-01-03 PROCEDURE — 83880 ASSAY OF NATRIURETIC PEPTIDE: CPT

## 2023-01-03 PROCEDURE — 93005 ELECTROCARDIOGRAM TRACING: CPT

## 2023-01-03 PROCEDURE — 87040 BLOOD CULTURE FOR BACTERIA: CPT

## 2023-01-03 PROCEDURE — 83735 ASSAY OF MAGNESIUM: CPT

## 2023-01-03 PROCEDURE — 93306 TTE W/DOPPLER COMPLETE: CPT

## 2023-01-03 PROCEDURE — 99239 HOSP IP/OBS DSCHRG MGMT >30: CPT

## 2023-01-03 PROCEDURE — 94640 AIRWAY INHALATION TREATMENT: CPT

## 2023-01-03 PROCEDURE — 96365 THER/PROPH/DIAG IV INF INIT: CPT

## 2023-01-03 PROCEDURE — 36415 COLL VENOUS BLD VENIPUNCTURE: CPT

## 2023-01-03 PROCEDURE — 82550 ASSAY OF CK (CPK): CPT

## 2023-01-03 PROCEDURE — 94760 N-INVAS EAR/PLS OXIMETRY 1: CPT

## 2023-01-03 PROCEDURE — 93970 EXTREMITY STUDY: CPT

## 2023-01-03 PROCEDURE — 82272 OCCULT BLD FECES 1-3 TESTS: CPT

## 2023-01-03 PROCEDURE — 76775 US EXAM ABDO BACK WALL LIM: CPT

## 2023-01-03 PROCEDURE — U0005: CPT

## 2023-01-03 PROCEDURE — 82553 CREATINE MB FRACTION: CPT

## 2023-01-03 PROCEDURE — 97162 PT EVAL MOD COMPLEX 30 MIN: CPT

## 2023-01-03 PROCEDURE — 71045 X-RAY EXAM CHEST 1 VIEW: CPT

## 2023-01-03 PROCEDURE — 87640 STAPH A DNA AMP PROBE: CPT

## 2023-01-03 PROCEDURE — 99223 1ST HOSP IP/OBS HIGH 75: CPT | Mod: GC

## 2023-01-03 PROCEDURE — 84484 ASSAY OF TROPONIN QUANT: CPT

## 2023-01-03 PROCEDURE — 84550 ASSAY OF BLOOD/URIC ACID: CPT

## 2023-01-03 PROCEDURE — 85027 COMPLETE CBC AUTOMATED: CPT

## 2023-01-03 PROCEDURE — 87449 NOS EACH ORGANISM AG IA: CPT

## 2023-01-03 PROCEDURE — 97165 OT EVAL LOW COMPLEX 30 MIN: CPT

## 2023-01-03 PROCEDURE — U0003: CPT

## 2023-01-03 PROCEDURE — 99291 CRITICAL CARE FIRST HOUR: CPT | Mod: 25

## 2023-01-03 PROCEDURE — 82803 BLOOD GASES ANY COMBINATION: CPT

## 2023-01-03 PROCEDURE — 97116 GAIT TRAINING THERAPY: CPT

## 2023-01-03 PROCEDURE — 36600 WITHDRAWAL OF ARTERIAL BLOOD: CPT

## 2023-01-03 RX ORDER — ALLOPURINOL 300 MG
100 TABLET ORAL DAILY
Refills: 0 | Status: DISCONTINUED | OUTPATIENT
Start: 2023-01-04 | End: 2023-01-12

## 2023-01-03 RX ORDER — SODIUM CHLORIDE 0.65 %
1 AEROSOL, SPRAY (ML) NASAL EVERY 6 HOURS
Refills: 0 | Status: DISCONTINUED | OUTPATIENT
Start: 2023-01-03 | End: 2023-01-12

## 2023-01-03 RX ORDER — TIOTROPIUM BROMIDE 18 UG/1
2 CAPSULE ORAL; RESPIRATORY (INHALATION) DAILY
Refills: 0 | Status: DISCONTINUED | OUTPATIENT
Start: 2023-01-04 | End: 2023-01-12

## 2023-01-03 RX ORDER — BUDESONIDE AND FORMOTEROL FUMARATE DIHYDRATE 160; 4.5 UG/1; UG/1
2 AEROSOL RESPIRATORY (INHALATION)
Qty: 0 | Refills: 0 | DISCHARGE
Start: 2023-01-03

## 2023-01-03 RX ORDER — HYDRALAZINE HCL 50 MG
25 TABLET ORAL EVERY 8 HOURS
Refills: 0 | Status: DISCONTINUED | OUTPATIENT
Start: 2023-01-03 | End: 2023-01-12

## 2023-01-03 RX ORDER — TIOTROPIUM BROMIDE 18 UG/1
2 CAPSULE ORAL; RESPIRATORY (INHALATION)
Qty: 0 | Refills: 0 | DISCHARGE
Start: 2023-01-03

## 2023-01-03 RX ORDER — ACETAMINOPHEN 500 MG
650 TABLET ORAL EVERY 6 HOURS
Refills: 0 | Status: DISCONTINUED | OUTPATIENT
Start: 2023-01-03 | End: 2023-01-12

## 2023-01-03 RX ORDER — ASPIRIN/CALCIUM CARB/MAGNESIUM 324 MG
1 TABLET ORAL
Qty: 0 | Refills: 0 | DISCHARGE
Start: 2023-01-03

## 2023-01-03 RX ORDER — SENNA PLUS 8.6 MG/1
2 TABLET ORAL AT BEDTIME
Refills: 0 | Status: DISCONTINUED | OUTPATIENT
Start: 2023-01-03 | End: 2023-01-12

## 2023-01-03 RX ORDER — FLUTICASONE PROPIONATE 50 MCG
1 SPRAY, SUSPENSION NASAL
Qty: 0 | Refills: 0 | DISCHARGE
Start: 2023-01-03

## 2023-01-03 RX ORDER — ALBUTEROL 90 UG/1
2 AEROSOL, METERED ORAL EVERY 6 HOURS
Refills: 0 | Status: DISCONTINUED | OUTPATIENT
Start: 2023-01-03 | End: 2023-01-12

## 2023-01-03 RX ORDER — FUROSEMIDE 40 MG
1 TABLET ORAL
Qty: 0 | Refills: 0 | DISCHARGE
Start: 2023-01-03

## 2023-01-03 RX ORDER — ALLOPURINOL 300 MG
1 TABLET ORAL
Qty: 0 | Refills: 0 | DISCHARGE
Start: 2023-01-03

## 2023-01-03 RX ORDER — HYDRALAZINE HCL 50 MG
1 TABLET ORAL
Qty: 0 | Refills: 0 | DISCHARGE
Start: 2023-01-03

## 2023-01-03 RX ORDER — ASPIRIN/CALCIUM CARB/MAGNESIUM 324 MG
81 TABLET ORAL DAILY
Refills: 0 | Status: DISCONTINUED | OUTPATIENT
Start: 2023-01-04 | End: 2023-01-12

## 2023-01-03 RX ORDER — CEFEPIME 1 G/1
1000 INJECTION, POWDER, FOR SOLUTION INTRAMUSCULAR; INTRAVENOUS EVERY 12 HOURS
Refills: 0 | Status: DISCONTINUED | OUTPATIENT
Start: 2023-01-03 | End: 2023-01-03

## 2023-01-03 RX ORDER — BUDESONIDE AND FORMOTEROL FUMARATE DIHYDRATE 160; 4.5 UG/1; UG/1
2 AEROSOL RESPIRATORY (INHALATION)
Refills: 0 | Status: DISCONTINUED | OUTPATIENT
Start: 2023-01-03 | End: 2023-01-12

## 2023-01-03 RX ORDER — APIXABAN 2.5 MG/1
2.5 TABLET, FILM COATED ORAL EVERY 12 HOURS
Refills: 0 | Status: DISCONTINUED | OUTPATIENT
Start: 2023-01-03 | End: 2023-01-12

## 2023-01-03 RX ORDER — FUROSEMIDE 40 MG
40 TABLET ORAL
Refills: 0 | Status: DISCONTINUED | OUTPATIENT
Start: 2023-01-04 | End: 2023-01-04

## 2023-01-03 RX ORDER — FLUTICASONE PROPIONATE 50 MCG
1 SPRAY, SUSPENSION NASAL
Refills: 0 | Status: DISCONTINUED | OUTPATIENT
Start: 2023-01-03 | End: 2023-01-08

## 2023-01-03 RX ORDER — METOPROLOL TARTRATE 50 MG
50 TABLET ORAL DAILY
Refills: 0 | Status: DISCONTINUED | OUTPATIENT
Start: 2023-01-04 | End: 2023-01-12

## 2023-01-03 RX ORDER — ALBUTEROL 90 UG/1
2 AEROSOL, METERED ORAL
Qty: 0 | Refills: 0 | DISCHARGE
Start: 2023-01-03

## 2023-01-03 RX ORDER — PHENYLEPHRINE-SHARK LIVER OIL-MINERAL OIL-PETROLATUM RECTAL OINTMENT
1 OINTMENT (GRAM) RECTAL
Refills: 0 | Status: DISCONTINUED | OUTPATIENT
Start: 2023-01-03 | End: 2023-01-12

## 2023-01-03 RX ORDER — SENNA PLUS 8.6 MG/1
2 TABLET ORAL
Qty: 0 | Refills: 0 | DISCHARGE
Start: 2023-01-03

## 2023-01-03 RX ADMIN — APIXABAN 2.5 MILLIGRAM(S): 2.5 TABLET, FILM COATED ORAL at 05:31

## 2023-01-03 RX ADMIN — Medication 81 MILLIGRAM(S): at 11:06

## 2023-01-03 RX ADMIN — APIXABAN 2.5 MILLIGRAM(S): 2.5 TABLET, FILM COATED ORAL at 18:18

## 2023-01-03 RX ADMIN — Medication 100 MILLIGRAM(S): at 11:06

## 2023-01-03 RX ADMIN — ALBUTEROL 2 PUFF(S): 90 AEROSOL, METERED ORAL at 09:46

## 2023-01-03 RX ADMIN — Medication 40 MILLIGRAM(S): at 05:31

## 2023-01-03 RX ADMIN — CEFEPIME 100 MILLIGRAM(S): 1 INJECTION, POWDER, FOR SOLUTION INTRAMUSCULAR; INTRAVENOUS at 13:49

## 2023-01-03 RX ADMIN — Medication 1 SPRAY(S): at 05:31

## 2023-01-03 RX ADMIN — Medication 50 MILLIGRAM(S): at 05:30

## 2023-01-03 RX ADMIN — Medication 600 MILLIGRAM(S): at 18:18

## 2023-01-03 RX ADMIN — Medication 25 MILLIGRAM(S): at 13:49

## 2023-01-03 RX ADMIN — Medication 600 MILLIGRAM(S): at 05:30

## 2023-01-03 RX ADMIN — BUDESONIDE AND FORMOTEROL FUMARATE DIHYDRATE 2 PUFF(S): 160; 4.5 AEROSOL RESPIRATORY (INHALATION) at 09:45

## 2023-01-03 RX ADMIN — ALBUTEROL 2 PUFF(S): 90 AEROSOL, METERED ORAL at 15:37

## 2023-01-03 RX ADMIN — Medication 200 MILLIGRAM(S): at 05:30

## 2023-01-03 RX ADMIN — Medication 200 MILLIGRAM(S): at 13:49

## 2023-01-03 RX ADMIN — SENNA PLUS 2 TABLET(S): 8.6 TABLET ORAL at 21:58

## 2023-01-03 RX ADMIN — Medication 25 MILLIGRAM(S): at 05:30

## 2023-01-03 RX ADMIN — Medication 1 SPRAY(S): at 11:05

## 2023-01-03 RX ADMIN — CEFEPIME 100 MILLIGRAM(S): 1 INJECTION, POWDER, FOR SOLUTION INTRAMUSCULAR; INTRAVENOUS at 05:29

## 2023-01-03 RX ADMIN — Medication 200 MILLIGRAM(S): at 21:58

## 2023-01-03 RX ADMIN — TIOTROPIUM BROMIDE 2 PUFF(S): 18 CAPSULE ORAL; RESPIRATORY (INHALATION) at 09:45

## 2023-01-03 RX ADMIN — Medication 25 MILLIGRAM(S): at 21:58

## 2023-01-03 RX ADMIN — Medication 40 MILLIGRAM(S): at 13:50

## 2023-01-03 NOTE — H&P ADULT - NSHPREVIEWOFSYSTEMS_GEN_ALL_CORE
REVIEW OF SYSTEMS  Constitutional: No fever, No Chills, No fatigue  HEENT: No eye pain, No visual disturbances, No difficulty hearing, No Dysphagia   Pulm: No cough,  No shortness of breath  Cardio: No chest pain, No palpitations  GI:  No abdominal pain, No nausea, No vomiting  : No dysuria, No frequency, No hematuria  Neuro: No headaches, No memory loss, No loss of strength, No numbness, No tremors  Skin: No itching, No rashes, No lesions   Endo: No temperature intolerance  MSK: No joint pain, No joint swelling, No muscle pain, No Neck or back pain  Psych:  No depression, No anxiety REVIEW OF SYSTEMS  Constitutional: No fever, No Chills, No fatigue  HEENT: No eye pain, No visual disturbances, + hard of  hearing, No Dysphagia   Pulm: No cough,  No shortness of breath  Cardio: No chest pain, No palpitations. + LE edema   GI:  No abdominal pain, No nausea, No vomiting  : No dysuria, No frequency, No hematuria  Neuro: No headaches, No memory loss, No loss of strength, No numbness, + loss of balance   Skin: No itching, No rashes, No lesions   MSK: No joint pain, No joint swelling, No muscle pain, No Neck or back pain  Psych:  No depression, No anxiety

## 2023-01-03 NOTE — DISCHARGE NOTE NURSING/CASE MANAGEMENT/SOCIAL WORK - NSDCPEEMAIL_GEN_ALL_CORE
[facility] for Tobacco Control email [email or web address]

## 2023-01-03 NOTE — PROGRESS NOTE ADULT - SUBJECTIVE AND OBJECTIVE BOX
No distress, on RA    Vital Signs Last 24 Hrs  T(C): 36.9 (01-03-23 @ 16:25), Max: 36.9 (01-03-23 @ 05:28)  T(F): 98.5 (01-03-23 @ 16:25), Max: 98.5 (01-03-23 @ 05:28)  HR: 63 (01-03-23 @ 16:25) (60 - 72)  BP: 133/67 (01-03-23 @ 16:25) (133/67 - 139/64)  RR: 16 (01-03-23 @ 16:25) (16 - 18)  SpO2: 95% (01-03-23 @ 16:25) (95% - 99%)    s1s2  b/l air entry  soft, ND  + edema, stasis changes                                                         10.7   12.65 )-----------( 234      ( 03 Jan 2023 06:00 )             32.4     03 Jan 2023 06:00    138    |  102    |  47     ----------------------------<  92     4.0     |  29     |  1.19     Ca    8.7        03 Jan 2023 06:00    TPro  6.8    /  Alb  2.3    /  TBili  0.5    /  DBili  x      /  AST  36     /  ALT  33     /  AlkPhos  49     02 Jan 2023 07:48    LIVER FUNCTIONS - ( 02 Jan 2023 07:48 )  Alb: 2.3 g/dL / Pro: 6.8 g/dL / ALK PHOS: 49 U/L / ALT: 33 U/L / AST: 36 U/L / GGT: x           acetaminophen     Tablet .. 650 milliGRAM(s) Oral every 6 hours PRN  albuterol    90 MICROgram(s) HFA Inhaler 2 Puff(s) Inhalation every 6 hours PRN  allopurinol 100 milliGRAM(s) Oral daily  apixaban 2.5 milliGRAM(s) Oral every 12 hours  aspirin enteric coated 81 milliGRAM(s) Oral daily  benzonatate 200 milliGRAM(s) Oral three times a day  bisacodyl 5 milliGRAM(s) Oral every 12 hours PRN  budesonide 160 MICROgram(s)/formoterol 4.5 MICROgram(s) Inhaler 2 Puff(s) Inhalation two times a day  fluticasone propionate 50 MICROgram(s)/spray Nasal Spray 1 Spray(s) Both Nostrils two times a day  furosemide    Tablet 40 milliGRAM(s) Oral two times a day  guaiFENesin  milliGRAM(s) Oral every 12 hours  hemorrhoidal Ointment 1 Application(s) Rectal two times a day PRN  hydrALAZINE 25 milliGRAM(s) Oral every 8 hours  metoprolol succinate ER 50 milliGRAM(s) Oral daily  senna 2 Tablet(s) Oral at bedtime  sodium chloride 0.65% Nasal 1 Spray(s) Both Nostrils every 6 hours PRN  tiotropium 2.5 MICROgram(s) Inhaler 2 Puff(s) Inhalation daily    A/P:    S/p Flu A, b/l PNA  CM, mod MR, TR, EF 45-50%  S/p hemodynamic IRINEO/CKD 3 (Cr 1.16 - 3/10/22)  Improved  UA bland  Renal SONO w/o hydro  Continue Lasix 40mg PO BID  Avoid nephrotoxins  F/u [facility]     [phone]

## 2023-01-03 NOTE — PROGRESS NOTE ADULT - PROVIDER SPECIALTY LIST ADULT
Critical Care
Critical Care
Hospitalist
Infectious Disease
Nephrology
Pulmonology
Pulmonology
Cardiology
Cardiology
Critical Care
Hospitalist
Nephrology
Nephrology
Palliative Care
Pulmonology
Cardiology
Critical Care
Hospitalist
Infectious Disease
Infectious Disease
Internal Medicine
Nephrology
Pulmonology
Cardiology
Cardiology
Hospitalist
Hospitalist
Infectious Disease
Palliative Care
Pulmonology
Hospitalist

## 2023-01-03 NOTE — DISCHARGE NOTE PROVIDER - NSDCMRMEDTOKEN_GEN_ALL_CORE_FT
albuterol 90 mcg/inh inhalation aerosol: 2 puff(s) inhaled every 6 hours  allopurinol 100 mg oral tablet: 1 tab(s) orally once a day  apixaban 5 mg oral tablet: 1 tab(s) orally 2 times a day  aspirin 81 mg oral delayed release tablet: 1 tab(s) orally once a day  benzonatate 100 mg oral capsule: 2 cap(s) orally 3 times a day  bisacodyl 5 mg oral delayed release tablet: 1 tab(s) orally every 12 hours, As needed, Constipation  budesonide-formoterol 160 mcg-4.5 mcg/inh inhalation aerosol: 2 puff(s) inhaled 2 times a day  fluticasone 50 mcg/inh nasal spray: 1 spray(s) nasal 2 times a day  furosemide 40 mg oral tablet: 1 tab(s) orally 2 times a day  guaiFENesin 600 mg oral tablet, extended release: 1 tab(s) orally every 12 hours  hydrALAZINE 25 mg oral tablet: 1 tab(s) orally every 8 hours  metoprolol succinate 50 mg oral tablet, extended release: 1 tab(s) orally once a day  senna leaf extract oral tablet: 2 tab(s) orally once a day (at bedtime)  tiotropium 2.5 mcg/inh inhalation aerosol: 2 puff(s) inhaled once a day

## 2023-01-03 NOTE — DISCHARGE NOTE PROVIDER - HOSPITAL COURSE
Hospital Course  HPI:  88M PMH atrial fibrillation on eliquis, history of CVA, Hypertension presents for SOB.   Patient conversant is a poor historian.  Unable to provide much information relevant.  Initially seen after being titrated off BIPAP and feeling okay however while waiting in ER patient with increasing BP and suspected of going back into flash pulmonary edema secondary to uncontrolled hypertension and placed back on BIPAP and given additional dose of lasix and labetalol.      Discussed with ICU Pa who agreed to take patient to CCU. (17 Dec 2022 11:26)    Patient was admitted to ICU for acute hypoxic respiratory failure and chest pain, found to have flash pulmonary edema and flu. He was placed  on bipap while in ICU for acute resp failure and tolerated well. He also had CT chest done which showed multifocal PNA and was started on steroids, which he completed taper as well as cefepime and completed 7 day course during hospitalization. He was placed on nasal cannula and titrated off as tolerated. Patient was seen by ID during hospitalization as well. For CHF exacerbation, he was treated with IVF lasix and transitioned to oral lasix, also noted to have hypertensive urgency and was placed on nitro gtt in the ICU.  ECHO showed EF 45-50%. He was transferred to medical floors and stabilized further, completed abx course for multifocal pna and now saturating well on RA. He is no longer in CHF exacerbation and euvolemic. He was on valsartan 3/2022 however did not continue, this was not started during hospitalization as patient with IRINEO which improved. Can consider starting in rehab once patient more ambulatory (prevent orthostasis therefore will not start now). He was seen by PT and recommend acute rehab, for which he was accepted.    You were admitted for shortness of breath  You were diagnosed with acute hypoxic respiratory failure secondary to CHF exacerbation, flu and multifocal pneumonia  You were treated with diuretics, tamiflu and IV anitbiotics  You were prescribed the following new medications: lasix 40mg twice daily    You will need to follow up with your primary care physician. You will be transferred to acute rehab    Discharging Provider:  Tamika Aguilar D.O.  Contact Info: Cell [phone] - Please call with any questions or concerns.

## 2023-01-03 NOTE — H&P ADULT - HISTORY OF PRESENT ILLNESS
88 year old male with a PMH of CVA, CHF, HTN, afib on eliquis, hip fx s/p fall and prior intubation for HF exacerbation presenting to [facility] ED for SOB and CP admitted to ICU at [facility] on 12/17/22 for acute hypoxic respiratory failure, flash pulmonary edema and acute CHF exacerbation. Also found to be Flu A positive, resulting in multifocal PNA. Pt was on BIPAP, weaned down to NC 4L. Tolerated well. Troponins were mildly elevatd, but stablized, likely demand ischemia. Pt initially treated w/ tamiflu for influenza A, later developed multifocal pneunonia, neg for MRSA and legionella. ID recommended Cefepime, which patient finished last dose of on 1/3/23. Patient downgraded to medicine on 12/21/22. Seen by PM&R, PT, OT who recommended acute rehab. Patient admitted to [facility] for acute rehab on 1/3/23.

## 2023-01-03 NOTE — CONSULT NOTE ADULT - CONSULT REQUESTED DATE/TIME
03-Jan-2023 10:28
17-Dec-2022 12:44
27-Dec-2022 13:41
17-Dec-2022 11:07
19-Dec-2022 11:22
25-Dec-2022 09:33

## 2023-01-03 NOTE — DISCHARGE NOTE NURSING/CASE MANAGEMENT/SOCIAL WORK - NSDCPEWEB_GEN_ALL_CORE
[facility] for Tobacco Control website --- [email or web address] website --- [email or web address]

## 2023-01-03 NOTE — DISCHARGE NOTE NURSING/CASE MANAGEMENT/SOCIAL WORK - NSDCPEFALRISK_GEN_ALL_CORE
For information on Fall & Injury Prevention, visit: [email or web address] OR  [email or web address] OR  https://www.cdc.gov/steadi/patient.html

## 2023-01-03 NOTE — DISCHARGE NOTE PROVIDER - CARE PROVIDER_API CALL
Chapo Hernandez (DO)  Critical Care Medicine; Internal Medicine; Pulmonary Disease  [address]  [place] [address]  Phone: [phone]  Fax: [phone]  Follow Up Time:

## 2023-01-03 NOTE — DISCHARGE NOTE PROVIDER - NSDCCPCAREPLAN_GEN_ALL_CORE_FT
PRINCIPAL DISCHARGE DIAGNOSIS  Diagnosis: Sepsis with acute hypoxic respiratory failure  Assessment and Plan of Treatment: You were admitted for shortness of breath  You were diagnosed with acute hypoxic respiratory failure secondary to CHF exacerbation, flu and multifocal pneumonia  You were treated with diuretics, tamiflu and IV anitbiotics  You were prescribed the following new medications: lasix 40mg twice daily  You will need to follow up with your primary care physician. You will be transferred to acute rehab  Discharging Provider:  Tamika Aguilar D.O.  Contact Info: Cell [phone] - Please call with any questions or concerns.

## 2023-01-03 NOTE — H&P ADULT - NSHPPHYSICALEXAM_GEN_ALL_CORE
Vital Signs  T(C): 36.9 (01-03-23 @ 05:28), Max: 36.9 (01-03-23 @ 05:28)  HR: 72 (01-03-23 @ 05:28) (60 - 79)  BP: 139/64 (01-03-23 @ 05:28) (135/54 - 139/64)  RR: 18 (01-03-23 @ 05:28) (18 - 18)  SpO2: 98% (01-03-23 @ 09:28) (95% - 99%)    Gen - NAD, Comfortable  HEENT - NCAT, EOMI, MMM  Neck - Supple, No limited ROM  Pulm - CTAB, No wheeze, No rhonchi, No crackles  Cardiovascular - RRR, S1S2, No m/r/g  Abdomen - Soft, NT/ND, +BS  Extremities - No C/C/E, No calf tenderness  Neuro-     Cognitive - AAOx3     Communication - Fluent, No dysarthria     Attention: Intact      Judgement: Good evidence of judgement     Memory: Recall 3 objects immediate and 3 min later         Cranial Nerves - CN 2-12 intact     Motor -                    LEFT    UE - ShAB 5/5, EF 5/5, EE 5/5, WE 5/5,  5/5                    RIGHT UE - ShAB 5/5, EF 5/5, EE 5/5, WE 5/5,  5/5                    LEFT    LE - HF 5/5, KE 5/5, DF 5/5, PF 5/5                    RIGHT LE - HF 5/5, KE 5/5, DF 5/5, PF 5/5        Sensory - Intact to LT     Reflexes - DTR Intact, No primitive reflex     Coordination - FTN intact     Tone - normal  Psychiatric - Mood stable, Affect WNL  Skin: Intact  Wounds: None Present Vital Signs  T(C): 36.9 (01-03-23 @ 05:28), Max: 36.9 (01-03-23 @ 05:28)  HR: 72 (01-03-23 @ 05:28) (60 - 79)  BP: 139/64 (01-03-23 @ 05:28) (135/54 - 139/64)  RR: 18 (01-03-23 @ 05:28) (18 - 18)  SpO2: 98% (01-03-23 @ 09:28) (95% - 99%)    Gen - NAD, Comfortable  HEENT - NCAT, EOMI, hard of hearing   Neck - Supple, No limited ROM  Pulm - CTAB, No wheeze,  Cardiovascular - RRR,  Abdomen - Soft, NT/ND, +BS  Extremities - No C/C/E, No calf tenderness. +chronic venous stasis changes   Neuro-     Cognitive - AAOx3 ( cuing for hospital )      Communication - Fluent, No dysarthria     Judgement: Good evidence of judgement        Cranial Nerves - CN 2-12 intact     Motor -                    LEFT    UE - ShAB 3/5, EF 3/5, EE 4 /5, WE 5/5,  5/5 limited by shoulder pain                     RIGHT UE - ShAB 5/5, EF 5/5, EE 5/5, WE 5/5,  5/5                    LEFT    LE - HF 4/5, KE 4/5, DF 5/5, PF 5/5                    RIGHT LE - HF 4/5, KE 4/5, DF 5/5, PF 5/5        Sensory - Intact to LT     Psychiatric - Mood stable, Affect WNL  Skin: Vital Signs  T(C): 36.9 (01-03-23 @ 05:28), Max: 36.9 (01-03-23 @ 05:28)  HR: 72 (01-03-23 @ 05:28) (60 - 79)  BP: 139/64 (01-03-23 @ 05:28) (135/54 - 139/64)  RR: 18 (01-03-23 @ 05:28) (18 - 18)  SpO2: 98% (01-03-23 @ 09:28) (95% - 99%)    Gen - NAD, Comfortable  HEENT - NCAT, EOMI, hard of hearing   Neck - Supple, No limited ROM  Pulm - CTAB, No wheeze,  Cardiovascular - RRR,  Abdomen - Soft, NT/ND, +BS  Extremities - No C/C/E, No calf tenderness. +chronic venous stasis changes   Neuro-     Cognitive - AAOx3 ( cuing for hospital )      Communication - Fluent, No dysarthria     Judgement: Good evidence of judgement        Cranial Nerves - CN 2-12 intact     Motor -                    LEFT    UE - ShAB 3/5, EF 3/5, EE 4 /5, WE 5/5,  5/5 limited by shoulder pain                     RIGHT UE - ShAB 5/5, EF 5/5, EE 5/5, WE 5/5,  5/5                    LEFT    LE - HF 4/5, KE 4/5, DF 5/5, PF 5/5                    RIGHT LE - HF 4/5, KE 4/5, DF 5/5, PF 5/5        Sensory - Intact to LT     Psychiatric - Mood stable, Affect WNL  Skin: Stage 2 right buttock pressure ulcer

## 2023-01-03 NOTE — DISCHARGE NOTE NURSING/CASE MANAGEMENT/SOCIAL WORK - PATIENT PORTAL LINK FT
You can access the FollowMyHealth Patient Portal offered by [facility] by registering at the following website: [email or web address]. By joining [facility]’s FollowMyHealth portal, you will also be able to view your health information using other applications (apps) compatible with our system.

## 2023-01-03 NOTE — PROGRESS NOTE ADULT - ASSESSMENT
88 year old male with a PMH of CVA, CHF, HTN, afib on eliquis, hip fx s/p fall and prior intubation for HF exacerbation presenting to [facility] ED for SOB and CP admitted to ICU for acute hypoxix respiratory failure, flash pulmonary edema and acute CHF exacerbation also found to be Flu A positive now downgraded to medical floors    #acute hypoxic respiratory failure  #multifocal PNA  #influenza A  - CT chest showed multifocal PNA  - CXR 12/31 still with infiltrates but better than before  - s/p BiPAP in the ICU and IV lasix  - titrated off o2 and now on RA- saturating well  - completed steroid taper  - continue cefepime, today is last day  - s/p tamiflu treatment  - strept and legionella urine antigen negative, MRSA nasal PCR negative  - continue symbicort, nasal spray  - patient does not like duoneb- will change to albuterol inhaler and tiotropium  - continue chest PT   - ID following, recs appreciated  - pulm following, recs appreciated- discussed with Dr. Hernandez    #Acute Pulmonary Edema  #Hypertensive Urgency with Acute on Chronic Combined CHF  - s/p nitro gtt for hypertensive urgency  - Hold ARB due to renal function  - lasix increased to 40mg BID as per renal  - continue hydralazine 25 mg q8h, metoprolol succ 50 mg daily  - cardiac follow up to optimize meds  - monitor volume status, daily weights, I/Os, replete lytes as needed    #Elevated Troponin  #NSTEMI, likely type II  - likely demand ischemia  - cardiology appreciated  - cont medical management, cont asa, eliquis, bb    #IRINEO on CKD 3  - hold ARB, consider restarting on discharge as IRINEO just resolved  - avoid nephrotoxins  - renally adjusted meds  - monitor BMP  - appreciate Renal consult - d/w Dr. Pineda. Cont lasix for now- May need to accept a higher creatinine.      #Chronic Atrial Fibrillation  - continue metoprolol for rate control, eliquis for stroke ppx    #History of CVA  - continue ASA    #DVT PPx  - eliquis adjusted to 2.5 BID for renal fxn    Daughter Lora at bedside, updated and all questions answered. She states patient originally used a cane at baseline but then got COVID and fell and since then has been using a walker but he usually does not like hospitals and is now motivated to get better. He wants to go to acute rehab. At this time, patient lives with 2 daughters who help him with other ADLs he is unable to perform but he states that given his current weakness they may not be able to help him with everything, but once he is stronger and more independent he will go home. Good family support. PMR eval pending 88 year old male with a PMH of CVA, CHF, HTN, afib on eliquis, hip fx s/p fall and prior intubation for HF exacerbation presenting to [facility] ED for SOB and CP admitted to ICU for acute hypoxix respiratory failure, flash pulmonary edema and acute CHF exacerbation also found to be Flu A positive now downgraded to medical floors    #acute hypoxic respiratory failure  #multifocal PNA  #influenza A  - CT chest showed multifocal PNA  - CXR 12/31 still with infiltrates but better than before  - s/p BiPAP in the ICU and IV lasix  - titrated off o2 and now on RA- saturating well  - completed steroid taper  - continue cefepime, today is last day  - s/p tamiflu treatment  - strept and legionella urine antigen negative, MRSA nasal PCR negative  - continue symbicort, nasal spray  - patient does not like duoneb- will change to albuterol inhaler and tiotropium  - continue chest PT   - ID following, recs appreciated  - pulm following, recs appreciated- discussed with Dr. Hernandez    #Acute Pulmonary Edema  #Hypertensive Urgency with Acute on Chronic Combined CHF  - s/p nitro gtt for hypertensive urgency  - echo 12/17 with EF 45-50%  - Hold ARB due to renal function-  was on valsartan in March but has not used since then  - lasix increased to 40mg BID as per renal  - continue hydralazine 25 mg q8h, metoprolol succ 50 mg daily  - cardiac follow up to optimize meds  - monitor volume status, daily weights, I/Os, replete lytes as needed    #Elevated Troponin  #NSTEMI, likely type II  - likely demand ischemia  - cardiology appreciated  - cont medical management, cont asa, eliquis, bb    #IRINEO on CKD 3  - hold ARB, consider restarting on discharge as IRINEO just resolved- was on valsartan in March but has not used since then  - avoid nephrotoxins  - renally adjusted meds  - monitor BMP  - appreciate Renal consult - d/w Dr. Pineda. Cont lasix for now- May need to accept a higher creatinine.      #Chronic Atrial Fibrillation  - continue metoprolol for rate control, eliquis for stroke ppx    #History of CVA  - continue ASA    #DVT PPx  - eliquis adjusted to 2.5 BID for renal fxn    Daughter Lora at bedside, updated and all questions answered. She states patient originally used a cane at baseline but then got COVID and fell and since then has been using a walker but he usually does not like hospitals and is now motivated to get better. He wants to go to acute rehab. At this time, patient lives with 2 daughters who help him with other ADLs he is unable to perform but he states that given his current weakness they may not be able to help him with everything, but once he is stronger and more independent he will go home. Good family support. PMR alfredo pending    addendum:  accepted to acute rehab

## 2023-01-03 NOTE — PROGRESS NOTE ADULT - REASON FOR ADMISSION

## 2023-01-03 NOTE — CONSULT NOTE ADULT - ASSESSMENT
Respiratory failure, functioning decline     . PT- bed mobility,transfers, gait and balance training  2. OT- ADL'S  3. CHF- continue diuresis.  4. Resp failure/ PNA  - continue abx   4. Patient would benefit from acute rehab, needs a multidisciplinary team including PT, OT Can tolerate 3 hours of therapy a day.  Will follow.

## 2023-01-03 NOTE — CONSULT NOTE ADULT - SUBJECTIVE AND OBJECTIVE BOX
88 year old male with a PMH of CVA, CHF, HTN, afib on eliquis, hip fx s/p fall and prior intubation for HF exacerbation presenting to [facility] ED for SOB and CP admitted to ICU for acute hypoxix respiratory failure, flash pulmonary edema and acute CHF exacerbation also found to be Flu A positive now downgraded to medical floors      s/p BiPAP in the ICU and IV lasix  - titrated off o2 and now on RA- saturating well  - continue steroid taper- now on prednisone 10mg x4 days  - CT chest showed multifocal PNA  - CXR 12/31 still with infiltrates but better than before  - continue cefepime- course as per ID, day 6 (favor 5-7 days of IV abx as per ID)  - s/p tamiflu treatment      #Acute Pulmonary Edema  #Hypertensive Urgency with Acute on Chronic Combined CHF  - s/p nitro gtt for hypertensive urgency  - Hold ARB due to renal function  - continue daily lasix 40mg qd - follow renal fxn  - continue hydralazine 25 mg q8h, metoprolol succ 50 mg daily  - cardiac follow up to optimize meds  - monitor volume status, daily weights, I/Os, replete lytes as needed    REVIEW OF SYSTEMS: No chest pain, shortness of breath, nausea, vomiting or diarhea.      PAST MEDICAL & SURGICAL HISTORY  Afib    Congestive heart failure (CHF)    CVA (cerebral vascular accident)    Hypertension, unspecified type    No significant past surgical history        SOCIAL HISTORY  Smoking - Denied, EtOH - Denied, Drugs - Denied    FUNCTIONAL HISTORY:   Lives with family   Independent prior to admission     CURRENT FUNCTIONAL STATUS: min a       FAMILY HISTORY   No pertinent family history in first degree relatives    No pertinent family history in first degree relatives        RECENT LABS/IMAGING  CBC Full  -  ( 03 Jan 2023 06:00 )  WBC Count : 12.65 K/uL  RBC Count : 3.42 M/uL  Hemoglobin : 10.7 g/dL  Hematocrit : 32.4 %  Platelet Count - Automated : 234 K/uL  Mean Cell Volume : 94.7 fl  Mean Cell Hemoglobin : 31.3 pg  Mean Cell Hemoglobin Concentration : 33.0 gm/dL  Auto Neutrophil # : x  Auto Lymphocyte # : x  Auto Monocyte # : x  Auto Eosinophil # : x  Auto Basophil # : x  Auto Neutrophil % : x  Auto Lymphocyte % : x  Auto Monocyte % : x  Auto Eosinophil % : x  Auto Basophil % : x    01-03    138  |  102  |  47<H>  ----------------------------<  92  4.0   |  29  |  1.19    Ca    8.7      03 Jan 2023 06:00    TPro  6.8  /  Alb  2.3<L>  /  TBili  0.5  /  DBili  x   /  AST  36  /  ALT  33  /  AlkPhos  49  01-02        VITALS  T(C): 36.9 (01-03-23 @ 05:28), Max: 36.9 (01-03-23 @ 05:28)  HR: 72 (01-03-23 @ 05:28) (60 - 89)  BP: 139/64 (01-03-23 @ 05:28) (128/64 - 139/64)  RR: 18 (01-03-23 @ 05:28) (18 - 18)  SpO2: 99% (01-03-23 @ 05:28) (95% - 99%)  Wt(kg): --    ALLERGIES  No Known Allergies      MEDICATIONS   albuterol    90 MICROgram(s) HFA Inhaler 2 Puff(s) Inhalation every 6 hours  allopurinol 100 milliGRAM(s) Oral daily  apixaban 2.5 milliGRAM(s) Oral every 12 hours  aspirin enteric coated 81 milliGRAM(s) Oral daily  benzonatate 200 milliGRAM(s) Oral three times a day  bisacodyl 5 milliGRAM(s) Oral every 12 hours PRN  budesonide 160 MICROgram(s)/formoterol 4.5 MICROgram(s) Inhaler 2 Puff(s) Inhalation two times a day  cefepime   IVPB      cefepime   IVPB 1000 milliGRAM(s) IV Intermittent every 12 hours  Eucerin Cream 1 Application(s) 1 Applicatorful Topical four times a day PRN  fluticasone propionate 50 MICROgram(s)/spray Nasal Spray 1 Spray(s) Both Nostrils two times a day  furosemide    Tablet 40 milliGRAM(s) Oral two times a day  guaiFENesin  milliGRAM(s) Oral every 12 hours  hemorrhoidal Ointment 1 Application(s) Rectal two times a day PRN  hydrALAZINE 25 milliGRAM(s) Oral every 8 hours  metoprolol succinate ER 50 milliGRAM(s) Oral daily  senna 2 Tablet(s) Oral at bedtime  sodium chloride 0.65% Nasal 1 Spray(s) Both Nostrils every 6 hours  tiotropium 2.5 MICROgram(s) Inhaler 2 Puff(s) Inhalation daily      ----------------------------------------------------------------------------------------  PHYSICAL EXAM  Constitutional - NAD, Comfortable  HEENT - NCAT, EOMI  Neck - Supple, No limited ROM  Chest - CTA bilaterally, No wheeze, No rhonchi, No crackles  Cardiovascular - RRR, S1S2, No murmurs  Abdomen - BS+, Soft, NTND  Extremities - No C/C/E, No calf tenderness   Neurologic Exam -                    Cognitive - Awake, Alert, AAO to self, place, date, year, situation     Communication - Fluent, No dysarthria, no aphasia     Cranial Nerves - CN 2-12 intact     Motor - No focal deficits                       Sensory - Intact to LT     Reflexes - DTR Intact, No primitive reflexive     Balance - WNL Static  Psychiatric - Mood stable, Affect WNL   88 year old male with a PMH of CVA, CHF, HTN, afib on eliquis, hip fx s/p fall and prior intubation for HF exacerbation presenting to [facility] ED for SOB and CP admitted to ICU for acute hypoxix respiratory failure, flash pulmonary edema and acute CHF exacerbation also found to be Flu A positive now downgraded to medical floors      s/p BiPAP in the ICU and IV lasix, LE edema improving. SHortness of breath improving.    titrated off o2 and now on RA- saturating well   CT chest showed multifocal PNA, one more day IV cefepime, steroid taper    CXR 12/31 still with infiltrates but better than before  Hypertensive Urgency with Acute on Chronic Combined CHF-  s/p nitro gtt for hypertensive urgency, ARB held for renal function, on lasix, hydralazine,  metoprolol  has chronic pain left shoulder  wants to improve endurance before going home     REVIEW OF SYSTEMS: No chest pain, shortness of breath, nausea, vomiting or diarhea.      PAST MEDICAL & SURGICAL HISTORY  Afib    Congestive heart failure (CHF)    CVA (cerebral vascular accident)    Hypertension, unspecified type    No significant past surgical history        SOCIAL HISTORY  Smoking - Denied, EtOH - Denied, Drugs - Denied    FUNCTIONAL HISTORY:   Lives with family   Independent prior to admission     CURRENT FUNCTIONAL STATUS: min a       FAMILY HISTORY   No pertinent family history in first degree relatives    No pertinent family history in first degree relatives        RECENT LABS/IMAGING  CBC Full  -  ( 03 Jan 2023 06:00 )  WBC Count : 12.65 K/uL  RBC Count : 3.42 M/uL  Hemoglobin : 10.7 g/dL  Hematocrit : 32.4 %  Platelet Count - Automated : 234 K/uL  Mean Cell Volume : 94.7 fl  Mean Cell Hemoglobin : 31.3 pg  Mean Cell Hemoglobin Concentration : 33.0 gm/dL  Auto Neutrophil # : x  Auto Lymphocyte # : x  Auto Monocyte # : x  Auto Eosinophil # : x  Auto Basophil # : x  Auto Neutrophil % : x  Auto Lymphocyte % : x  Auto Monocyte % : x  Auto Eosinophil % : x  Auto Basophil % : x    01-03    138  |  102  |  47<H>  ----------------------------<  92  4.0   |  29  |  1.19    Ca    8.7      03 Jan 2023 06:00    TPro  6.8  /  Alb  2.3<L>  /  TBili  0.5  /  DBili  x   /  AST  36  /  ALT  33  /  AlkPhos  49  01-02        VITALS  T(C): 36.9 (01-03-23 @ 05:28), Max: 36.9 (01-03-23 @ 05:28)  HR: 72 (01-03-23 @ 05:28) (60 - 89)  BP: 139/64 (01-03-23 @ 05:28) (128/64 - 139/64)  RR: 18 (01-03-23 @ 05:28) (18 - 18)  SpO2: 99% (01-03-23 @ 05:28) (95% - 99%)  Wt(kg): --    ALLERGIES  No Known Allergies      MEDICATIONS   albuterol    90 MICROgram(s) HFA Inhaler 2 Puff(s) Inhalation every 6 hours  allopurinol 100 milliGRAM(s) Oral daily  apixaban 2.5 milliGRAM(s) Oral every 12 hours  aspirin enteric coated 81 milliGRAM(s) Oral daily  benzonatate 200 milliGRAM(s) Oral three times a day  bisacodyl 5 milliGRAM(s) Oral every 12 hours PRN  budesonide 160 MICROgram(s)/formoterol 4.5 MICROgram(s) Inhaler 2 Puff(s) Inhalation two times a day  cefepime   IVPB      cefepime   IVPB 1000 milliGRAM(s) IV Intermittent every 12 hours  Eucerin Cream 1 Application(s) 1 Applicatorful Topical four times a day PRN  fluticasone propionate 50 MICROgram(s)/spray Nasal Spray 1 Spray(s) Both Nostrils two times a day  furosemide    Tablet 40 milliGRAM(s) Oral two times a day  guaiFENesin  milliGRAM(s) Oral every 12 hours  hemorrhoidal Ointment 1 Application(s) Rectal two times a day PRN  hydrALAZINE 25 milliGRAM(s) Oral every 8 hours  metoprolol succinate ER 50 milliGRAM(s) Oral daily  senna 2 Tablet(s) Oral at bedtime  sodium chloride 0.65% Nasal 1 Spray(s) Both Nostrils every 6 hours  tiotropium 2.5 MICROgram(s) Inhaler 2 Puff(s) Inhalation daily      ----------------------------------------------------------------------------------------  PHYSICAL EXAM  Constitutional - NAD, Comfortable  HEENT - NCAT, EOMI  Neck - Supple, No limited ROM  Chest - CTA bilaterally, No wheeze, No rhonchi, No crackles  Cardiovascular - RRR, S1S2, No murmurs  Abdomen - BS+, Soft, NTND  Extremities - 3+ edema ( improving )   Neurologic Exam -                    Cognitive - Awake, Alert, AAO to self, place, date, year, situation     Communication - Fluent, No dysarthria, no aphasia     Cranial Nerves - CN 2-12 intact     Motor - 4/5 HF, KE , 5/5 FDF  UE 5/5 except left shoulder due to pain                        Sensory - Intact to LT     Reflexes - DTR Intact, No primitive reflexive     Balance - poor standing balance   Psychiatric - Mood stable, Affect WNL

## 2023-01-03 NOTE — H&P ADULT - NSHPLABSRESULTS_GEN_ALL_CORE
01-03    138  |  102  |  47<H>  ----------------------------<  92  4.0   |  29  |  1.19  01-02    136  |  100  |  51<H>  ----------------------------<  119<H>  4.0   |  29  |  1.35<H>  01-01    137  |  101  |  53<H>  ----------------------------<  109<H>  4.2   |  29  |  1.37<H>    Ca    8.7      03 Jan 2023 06:00  Ca    9.0      02 Jan 2023 07:48  Ca    8.8      01 Jan 2023 06:30    TPro  6.8  /  Alb  2.3<L>  /  TBili  0.5  /  DBili  x   /  AST  36  /  ALT  33  /  AlkPhos  49  01-02                                              10.7   12.65 )-----------( 234      ( 03 Jan 2023 06:00 )             32.4                         11.3   13.10 )-----------( 244      ( 02 Jan 2023 07:48 )             33.6                         11.4   14.13 )-----------( 270      ( 01 Jan 2023 06:30 )             34.0     CAPILLARY BLOOD GLUCOSE

## 2023-01-03 NOTE — PROGRESS NOTE ADULT - NUTRITIONAL ASSESSMENT
This patient has been assessed with a concern for Malnutrition and has been determined to have a diagnosis/diagnoses of Moderate protein-calorie malnutrition.    This patient is being managed with:   Diet Minced and Moist-  Consistent Carbohydrate {No Snacks}  DASH/TLC {Sodium & Cholesterol Restricted}  Supplement Feeding Modality:  Oral  Ensure Pudding Cans or Servings Per Day:  1       Frequency:  Two Times a day  Entered: Dec 27 2022 10:55AM    
This patient has been assessed with a concern for Malnutrition and has been determined to have a diagnosis/diagnoses of Moderate protein-calorie malnutrition.    This patient is being managed with:   Diet Minced and Moist-  DASH/TLC {Sodium & Cholesterol Restricted}  Supplement Feeding Modality:  Oral  Ensure Pudding Cans or Servings Per Day:  1       Frequency:  Two Times a day  Entered: Jan 2 2023  2:36PM    Diet Minced and Moist-  Consistent Carbohydrate {No Snacks}  DASH/TLC {Sodium & Cholesterol Restricted}  Supplement Feeding Modality:  Oral  Ensure Pudding Cans or Servings Per Day:  1       Frequency:  Two Times a day  Entered: Dec 27 2022 10:55AM    The following pending diet order is being considered for treatment of Moderate protein-calorie malnutrition:null
This patient has been assessed with a concern for Malnutrition and has been determined to have a diagnosis/diagnoses of Moderate protein-calorie malnutrition.    This patient is being managed with:   Diet Minced and Moist-  Consistent Carbohydrate {No Snacks}  DASH/TLC {Sodium & Cholesterol Restricted}  Supplement Feeding Modality:  Oral  Ensure Pudding Cans or Servings Per Day:  1       Frequency:  Two Times a day  Entered: Dec 27 2022 10:55AM    
This patient has been assessed with a concern for Malnutrition and has been determined to have a diagnosis/diagnoses of Moderate protein-calorie malnutrition.    This patient is being managed with:   Diet Minced and Moist-  Consistent Carbohydrate {No Snacks}  DASH/TLC {Sodium & Cholesterol Restricted}  Supplement Feeding Modality:  Oral  Ensure Pudding Cans or Servings Per Day:  1       Frequency:  Two Times a day  Entered: Dec 27 2022 10:55AM

## 2023-01-03 NOTE — PATIENT PROFILE ADULT - FALL HARM RISK - HARM RISK INTERVENTIONS

## 2023-01-03 NOTE — PROGRESS NOTE ADULT - SUBJECTIVE AND OBJECTIVE BOX
Patient is a 88y old  Male who presents with a chief complaint of shortness of breath (03 Jan 2023 10:28)      Patient seen and examined at bedside. No events overnight. Patient appears well today, no SOB, chest pain, abd pain. States he feels more back to normal today than last few days. Wants to go to acute rehab because he wants to get stronger    ALLERGIES:  No Known Allergies    MEDICATIONS  (STANDING):  albuterol    90 MICROgram(s) HFA Inhaler 2 Puff(s) Inhalation every 6 hours  allopurinol 100 milliGRAM(s) Oral daily  apixaban 2.5 milliGRAM(s) Oral every 12 hours  aspirin enteric coated 81 milliGRAM(s) Oral daily  benzonatate 200 milliGRAM(s) Oral three times a day  budesonide 160 MICROgram(s)/formoterol 4.5 MICROgram(s) Inhaler 2 Puff(s) Inhalation two times a day  cefepime   IVPB      cefepime   IVPB 1000 milliGRAM(s) IV Intermittent every 12 hours  fluticasone propionate 50 MICROgram(s)/spray Nasal Spray 1 Spray(s) Both Nostrils two times a day  furosemide    Tablet 40 milliGRAM(s) Oral two times a day  guaiFENesin  milliGRAM(s) Oral every 12 hours  hydrALAZINE 25 milliGRAM(s) Oral every 8 hours  metoprolol succinate ER 50 milliGRAM(s) Oral daily  senna 2 Tablet(s) Oral at bedtime  sodium chloride 0.65% Nasal 1 Spray(s) Both Nostrils every 6 hours  tiotropium 2.5 MICROgram(s) Inhaler 2 Puff(s) Inhalation daily    MEDICATIONS  (PRN):  bisacodyl 5 milliGRAM(s) Oral every 12 hours PRN Constipation  Eucerin Cream 1 Application(s) 1 Applicatorful Topical four times a day PRN dry/itchy skin  hemorrhoidal Ointment 1 Application(s) Rectal two times a day PRN rectal itching/ pain    Vital Signs Last 24 Hrs  T(F): 98.5 (03 Jan 2023 05:28), Max: 98.5 (03 Jan 2023 05:28)  HR: 72 (03 Jan 2023 05:28) (60 - 79)  BP: 139/64 (03 Jan 2023 05:28) (128/64 - 139/64)  RR: 18 (03 Jan 2023 05:28) (18 - 18)  SpO2: 98% (03 Jan 2023 09:28) (95% - 99%)  I&O's Summary    02 Jan 2023 07:01  -  03 Jan 2023 07:00  --------------------------------------------------------  IN: 950 mL / OUT: 600 mL / NET: 350 mL        PHYSICAL EXAM:  GENERAL: NAD, sitting in chair  HEAD:  Atraumatic, Normocephalic  EYES: conjunctiva and sclera clear  ENMT: Moist mucous membranes, Good dentition, no thrush  CHEST/LUNG: diminished throughout, wheezing auscultated with rhonchi throughout   HEART: RRR; S1/S2, No murmur  ABDOMEN: Soft, Nontender, Nondistended; Bowel sounds present  EXTREMITIES: No calf tenderness, No cyanosis, 1+ pitting edema b/l LE R>L (improving)  SKIN: Warm, perfused  PSYCH: Normal mood, Normal affect      LABS:                        10.7   12.65 )-----------( 234      ( 03 Jan 2023 06:00 )             32.4     01-03    138  |  102  |  47  ----------------------------<  92  4.0   |  29  |  1.19    Ca    8.7      03 Jan 2023 06:00    TPro  6.8  /  Alb  2.3  /  TBili  0.5  /  DBili  x   /  AST  36  /  ALT  33  /  AlkPhos  49  01-02                                    Culture - Blood (collected 27 Dec 2022 11:45)  Source: .Blood Blood-Peripheral  Final Report (01 Jan 2023 15:00):    No Growth Final    Culture - Blood (collected 27 Dec 2022 11:40)  Source: .Blood Blood-Peripheral  Final Report (01 Jan 2023 15:01):    No Growth Final      COVID-19 PCR: NotDetec (01-02-23 @ 10:55)  COVID-19 PCR: NotDetec (01-02-23 @ 06:00)      RADIOLOGY & ADDITIONAL TESTS:    Care Discussed with Consultants/Other Providers:

## 2023-01-03 NOTE — H&P ADULT - ASSESSMENT
Assessment/Plan:  RAD SINGLETON is a 88 year old male with a PMH of CVA, CHF, HTN, afib on eliquis, hip fx s/p fall and prior intubation for HF exacerbation presenting to [facility] ED for SOB and CP admitted to ICU at [facility] on 12/17/22 for acute hypoxic respiratory failure, flash pulmonary edema and acute CHF exacerbation, found to be Flu A positive, resulting in multifocal PNA. Now s/p tamiflu and cefepime. Admitted to [facility] for multidisciplinary rehab for her debility on 1/3/23.      -------------    Rehab Management/MEDICAL MANAGEMENT     #Debility  - Gait Instability, ADL impairments and Functional impairments: start Comprehensive Rehab Program of PT/OT - 3 hours a day, 5 days a week  - O2 NC as needed, wean as toleraed    #acute hypoxic respiratory failure  #multifocal PNA  #influenza A  - CT chest showed multifocal PNA  - CXR 12/31 still with infiltrates but better than before  - s/p BiPAP in the ICU and IV lasix  - titrated off o2 and now on RA- saturating well  - completed course of steroid taper and cefepime  - c/w symbicort, nasal spray  - c/w albuterol inhaler and tiotropium  - continue chest PT PRN    #Pulmonary Edema 2/2 acute on chronic CHF  - hold ARB due to poor renal function  - on lasix 40mg bid per renal recs  - c/w hydralazine and metoprolol  - monitor daily I/Os and weight    #IRINEO on CKD3  - hold ARB, can consider restarting on discharge from rehab as IRINEO resolves  - avoid nephrotoxins  - renally adjusted meds  - monitor BMP  - appreciate Renal consult - per Dr. Pineda. Cont lasix for now- May need to accept a higher creatinine.    #chronic Afib  - c/w eliquis 2.5mg bid      #Sleep  - melatonin PRN     #Skin  - Skin on admission: **  - Pressure injury/Skin: OOB to Chair, PT/OT     #Pain Mgmt   - Tylenol PRN,  - avoid nsaids d/t renal failure    #GI/Bowel Mgmt   - Continent c/w Senna  - Preparation H for hemorrhoids    #/Bladder Mgmt   -  PVR per routine, CIC>400cc    #FEN   - Diet - minced and moist + Thins  [CCHO, DASH/TLC]      #Precautions / PROPHYLAXIS:   - Falls, Cardiac,   - ortho: Weight bearing status: WBAT   - Lungs: Aspiration, Incentive Spirometer   - DVT: eliquis renally dosed        MEDICAL PROGNOSIS: GOOD                                   REHAB POTENTIAL: GOOD  ESTIMATED DISPOSITION: HOME                             ELOS: 10-14 Days   EXPECTED THERAPY:     P.T. 2hr/day       O.T. 1hr/day      S.L.P. 0hr/day      EXP FREQUENCY: 5 days per 7 day period     PRESCREEN COMPARISON: I have reviewed the prescreen information and I have found no relevant changes between the preadmission screening and my post admission evaluation     RATIONALE FOR INPATIENT ADMISSION - Patient demonstrates the following: (check all that apply)  [X] Medically appropriate for rehabilitation admission  [X] Has attainable rehab goals with an appropriate initial discharge plan  [X] Has rehabilitation potential (expected to make a significant improvement within a reasonable period of time)   [X] Requires close medical managment by a rehab physician, rehab nursing care, Hospitalist and comprehensive interdisciplinary team (including PT, OT, & or SLP, Prosthetics and Orthotics)     Assessment/Plan:  RAD SINGLETON is a 88 year old male with a PMH of CVA, CHF, HTN, afib on eliquis, hip fx s/p fall and prior intubation for HF exacerbation presenting to [facility] ED for SOB and CP admitted to ICU at [facility] on 12/17/22 for acute hypoxic respiratory failure, flash pulmonary edema and acute CHF exacerbation, found to be Flu A positive, resulting in multifocal PNA. Now s/p tamiflu and cefepime. Admitted to [facility] for multidisciplinary rehab for her debility on 1/3/23.      -------------    Rehab Management/MEDICAL MANAGEMENT     #Debility  - Gait Instability, ADL impairments and Functional impairments: start Comprehensive Rehab Program of PT/OT - 3 hours a day, 5 days a week  - O2 NC as needed, wean as toleraed    #acute hypoxic respiratory failure  #multifocal PNA  #influenza A  - CT chest showed multifocal PNA, CXR 12/31 still with infiltrates but better than before  - s/p BiPAP in the ICU and IV lasix  - titrated off o2 and now on RA- saturating well,  completed course of steroid taper and cefepime  - c/w symbicort, nasal spray, albuterol inhaler and tiotropium  - continue chest PT PRN    #Pulmonary Edema 2/2 acute on chronic CHF  - hold ARB due to poor renal function  - on lasix 40mg bid per renal recs-  - c/w hydralazine and metoprolol  - monitor daily I/Os and weight    #IRINEO on CKD3  - hold ARB, can consider restarting on discharge from rehab as IRINEO resolves  - avoid nephrotoxins,  renally adjusted meds  - monitor BMP  - appreciate Renal consult - per Dr. Pineda. Cont lasix for now- May need to accept a higher creatinine.    #chronic Afib  - c/w eliquis 2.5mg bid      #Sleep  - melatonin PRN     #Skin  - Skin on admission: **  - Pressure injury/Skin: OOB to Chair, PT/OT     #Pain Mgmt   - Tylenol PRN,  - avoid nsaids d/t renal failure    #GI/Bowel Mgmt   - Continent c/w Senna  - Preparation H for hemorrhoids    #/Bladder Mgmt   -  PVR per routine, CIC>400cc    #FEN   - Diet - minced and moist + Thins  [CCHO, DASH/TLC]      #Precautions / PROPHYLAXIS:   - Falls, Cardiac,   - ortho: Weight bearing status: WBAT   - Lungs: Aspiration, Incentive Spirometer   - DVT: eliquis renally dosed        MEDICAL PROGNOSIS: GOOD                                   REHAB POTENTIAL: GOOD  ESTIMATED DISPOSITION: HOME                             ELOS: 10-14 Days   EXPECTED THERAPY:     P.T. 2hr/day       O.T. 1hr/day      S.L.P. 0hr/day      EXP FREQUENCY: 5 days per 7 day period     PRESCREEN COMPARISON: I have reviewed the prescreen information and I have found no relevant changes between the preadmission screening and my post admission evaluation     RATIONALE FOR INPATIENT ADMISSION - Patient demonstrates the following: (check all that apply)  [X] Medically appropriate for rehabilitation admission  [X] Has attainable rehab goals with an appropriate initial discharge plan  [X] Has rehabilitation potential (expected to make a significant improvement within a reasonable period of time)   [X] Requires close medical managment by a rehab physician, rehab nursing care, Hospitalist and comprehensive interdisciplinary team (including PT, OT, & or SLP, Prosthetics and Orthotics)     Assessment/Plan:  RAD SINGLETON is a 88 year old male with a PMH of CVA, CHF, HTN, afib on eliquis, hip fx s/p fall and prior intubation for HF exacerbation presenting to [facility] ED for SOB and CP admitted to ICU at [facility] on 12/17/22 for acute hypoxic respiratory failure, flash pulmonary edema and acute CHF exacerbation, found to be Flu A positive, resulting in multifocal PNA. Now s/p tamiflu and cefepime. Admitted to [facility] for multidisciplinary rehab for her debility on 1/3/23.      -------------    Rehab Management/MEDICAL MANAGEMENT     #Debility  - Gait Instability, ADL impairments and Functional impairments: start Comprehensive Rehab Program of PT/OT - 3 hours a day, 5 days a week  - O2 NC as needed, wean as toleraed    #acute hypoxic respiratory failure  #multifocal PNA  #influenza A  - CT chest showed multifocal PNA, CXR 12/31 still with infiltrates but better than before  - s/p BiPAP in the ICU and IV lasix  - titrated off o2 and now on RA- saturating well,  completed course of steroid taper and cefepime  - c/w symbicort, nasal spray, albuterol inhaler and tiotropium  - continue chest PT PRN    #Pulmonary Edema 2/2 acute on chronic CHF  - hold ARB due to poor renal function  - on lasix 40mg bid per renal recs-  - c/w hydralazine and metoprolol  - monitor daily I/Os and weight    #IRINEO on CKD3  - hold ARB, can consider restarting on discharge from rehab as IRINEO resolves  - avoid nephrotoxins,  renally adjusted meds  - monitor BMP  - appreciate Renal consult - per Dr. Pineda. Cont lasix for now- May need to accept a higher creatinine.    #chronic Afib  - c/w eliquis 2.5mg bid      #Sleep  - melatonin PRN     #Skin  - Skin on admission: stage 2 right buttock pressure ulcer.  - Pressure injury/Skin: OOB to Chair, PT/OT   - Cavilon to periwound, and alyvene foam dressing BID or PRN if soiled/removed    #Pain Mgmt   - Tylenol PRN,  - avoid nsaids d/t renal failure    #GI/Bowel Mgmt   - Continent c/w Senna  - Preparation H for hemorrhoids    #/Bladder Mgmt   -  PVR per routine, CIC>400cc    #FEN   - Diet - minced and moist + Thins  [CCHO, DASH/TLC]      #Precautions / PROPHYLAXIS:   - Falls, Cardiac,   - ortho: Weight bearing status: WBAT   - Lungs: Aspiration, Incentive Spirometer   - DVT: eliquis renally dosed        MEDICAL PROGNOSIS: GOOD                                   REHAB POTENTIAL: GOOD  ESTIMATED DISPOSITION: HOME                             ELOS: 10-14 Days   EXPECTED THERAPY:     P.T. 2hr/day       O.T. 1hr/day      S.L.P. 0hr/day      EXP FREQUENCY: 5 days per 7 day period     PRESCREEN COMPARISON: I have reviewed the prescreen information and I have found no relevant changes between the preadmission screening and my post admission evaluation     RATIONALE FOR INPATIENT ADMISSION - Patient demonstrates the following: (check all that apply)  [X] Medically appropriate for rehabilitation admission  [X] Has attainable rehab goals with an appropriate initial discharge plan  [X] Has rehabilitation potential (expected to make a significant improvement within a reasonable period of time)   [X] Requires close medical managment by a rehab physician, rehab nursing care, Hospitalist and comprehensive interdisciplinary team (including PT, OT, & or SLP, Prosthetics and Orthotics)

## 2023-01-04 ENCOUNTER — TRANSCRIPTION ENCOUNTER (OUTPATIENT)
Age: 88
End: 2023-01-04

## 2023-01-04 DIAGNOSIS — J10.00 INFLUENZA DUE TO OTHER IDENTIFIED INFLUENZA VIRUS WITH UNSPECIFIED TYPE OF PNEUMONIA: ICD-10-CM

## 2023-01-04 LAB
ALBUMIN SERPL ELPH-MCNC: 2.5 G/DL — LOW (ref 3.3–5)
ALP SERPL-CCNC: 53 U/L — SIGNIFICANT CHANGE UP (ref 40–120)
ALT FLD-CCNC: 37 U/L — SIGNIFICANT CHANGE UP (ref 10–45)
ANION GAP SERPL CALC-SCNC: 5 MMOL/L — SIGNIFICANT CHANGE UP (ref 5–17)
AST SERPL-CCNC: 34 U/L — SIGNIFICANT CHANGE UP (ref 10–40)
BASOPHILS # BLD AUTO: 0 K/UL — SIGNIFICANT CHANGE UP (ref 0–0.2)
BASOPHILS NFR BLD AUTO: 0 % — SIGNIFICANT CHANGE UP (ref 0–2)
BILIRUB SERPL-MCNC: 0.6 MG/DL — SIGNIFICANT CHANGE UP (ref 0.2–1.2)
BUN SERPL-MCNC: 44 MG/DL — HIGH (ref 7–23)
CALCIUM SERPL-MCNC: 8.3 MG/DL — LOW (ref 8.4–10.5)
CHLORIDE SERPL-SCNC: 99 MMOL/L — SIGNIFICANT CHANGE UP (ref 96–108)
CO2 SERPL-SCNC: 33 MMOL/L — HIGH (ref 22–31)
CREAT SERPL-MCNC: 1.49 MG/DL — HIGH (ref 0.5–1.3)
EGFR: 45 ML/MIN/1.73M2 — LOW
EOSINOPHIL # BLD AUTO: 0.12 K/UL — SIGNIFICANT CHANGE UP (ref 0–0.5)
EOSINOPHIL NFR BLD AUTO: 1 % — SIGNIFICANT CHANGE UP (ref 0–6)
GLUCOSE SERPL-MCNC: 99 MG/DL — SIGNIFICANT CHANGE UP (ref 70–99)
HCT VFR BLD CALC: 33.4 % — LOW (ref 39–50)
HGB BLD-MCNC: 10.9 G/DL — LOW (ref 13–17)
LYMPHOCYTES # BLD AUTO: 2.44 K/UL — SIGNIFICANT CHANGE UP (ref 1–3.3)
LYMPHOCYTES # BLD AUTO: 20 % — SIGNIFICANT CHANGE UP (ref 13–44)
MCHC RBC-ENTMCNC: 32.2 PG — SIGNIFICANT CHANGE UP (ref 27–34)
MCHC RBC-ENTMCNC: 32.6 GM/DL — SIGNIFICANT CHANGE UP (ref 32–36)
MCV RBC AUTO: 98.8 FL — SIGNIFICANT CHANGE UP (ref 80–100)
MONOCYTES # BLD AUTO: 1.46 K/UL — HIGH (ref 0–0.9)
MONOCYTES NFR BLD AUTO: 12 % — SIGNIFICANT CHANGE UP (ref 2–14)
NEUTROPHILS # BLD AUTO: 7.92 K/UL — HIGH (ref 1.8–7.4)
NEUTROPHILS NFR BLD AUTO: 62 % — SIGNIFICANT CHANGE UP (ref 43–77)
PLATELET # BLD AUTO: 227 K/UL — SIGNIFICANT CHANGE UP (ref 150–400)
POTASSIUM SERPL-MCNC: 3.8 MMOL/L — SIGNIFICANT CHANGE UP (ref 3.5–5.3)
POTASSIUM SERPL-SCNC: 3.8 MMOL/L — SIGNIFICANT CHANGE UP (ref 3.5–5.3)
PROT SERPL-MCNC: 6.9 G/DL — SIGNIFICANT CHANGE UP (ref 6–8.3)
RBC # BLD: 3.38 M/UL — LOW (ref 4.2–5.8)
RBC # FLD: 13.9 % — SIGNIFICANT CHANGE UP (ref 10.3–14.5)
SODIUM SERPL-SCNC: 137 MMOL/L — SIGNIFICANT CHANGE UP (ref 135–145)
WBC # BLD: 12.18 K/UL — HIGH (ref 3.8–10.5)
WBC # FLD AUTO: 12.18 K/UL — HIGH (ref 3.8–10.5)

## 2023-01-04 PROCEDURE — 99223 1ST HOSP IP/OBS HIGH 75: CPT

## 2023-01-04 PROCEDURE — 99407 BEHAV CHNG SMOKING > 10 MIN: CPT

## 2023-01-04 PROCEDURE — 99232 SBSQ HOSP IP/OBS MODERATE 35: CPT

## 2023-01-04 RX ORDER — FUROSEMIDE 40 MG
40 TABLET ORAL DAILY
Refills: 0 | Status: DISCONTINUED | OUTPATIENT
Start: 2023-01-05 | End: 2023-01-06

## 2023-01-04 RX ADMIN — Medication 200 MILLIGRAM(S): at 22:33

## 2023-01-04 RX ADMIN — Medication 1 SPRAY(S): at 05:56

## 2023-01-04 RX ADMIN — Medication 200 MILLIGRAM(S): at 13:46

## 2023-01-04 RX ADMIN — Medication 200 MILLIGRAM(S): at 05:56

## 2023-01-04 RX ADMIN — Medication 40 MILLIGRAM(S): at 05:57

## 2023-01-04 RX ADMIN — SENNA PLUS 2 TABLET(S): 8.6 TABLET ORAL at 22:33

## 2023-01-04 RX ADMIN — Medication 10 MILLIGRAM(S): at 14:31

## 2023-01-04 RX ADMIN — Medication 50 MILLIGRAM(S): at 05:57

## 2023-01-04 RX ADMIN — Medication 25 MILLIGRAM(S): at 05:57

## 2023-01-04 RX ADMIN — Medication 600 MILLIGRAM(S): at 06:05

## 2023-01-04 RX ADMIN — Medication 100 MILLIGRAM(S): at 11:05

## 2023-01-04 RX ADMIN — Medication 600 MILLIGRAM(S): at 17:35

## 2023-01-04 RX ADMIN — Medication 1 SPRAY(S): at 17:35

## 2023-01-04 RX ADMIN — Medication 25 MILLIGRAM(S): at 22:33

## 2023-01-04 RX ADMIN — Medication 25 MILLIGRAM(S): at 13:45

## 2023-01-04 RX ADMIN — Medication 81 MILLIGRAM(S): at 11:04

## 2023-01-04 RX ADMIN — APIXABAN 2.5 MILLIGRAM(S): 2.5 TABLET, FILM COATED ORAL at 05:56

## 2023-01-04 RX ADMIN — APIXABAN 2.5 MILLIGRAM(S): 2.5 TABLET, FILM COATED ORAL at 17:35

## 2023-01-04 NOTE — PROGRESS NOTE ADULT - SUBJECTIVE AND OBJECTIVE BOX
No distress    Vital Signs Last 24 Hrs  T(C): 36.7 (01-04-23 @ 08:01), Max: 36.7 (01-04-23 @ 08:01)  T(F): 98.1 (01-04-23 @ 08:01), Max: 98.1 (01-04-23 @ 08:01)  HR: 79 (01-04-23 @ 08:01) (68 - 79)  BP: 147/75 (01-04-23 @ 08:01) (147/75 - 150/69)  RR: 16 (01-04-23 @ 08:01) (16 - 16)  SpO2: 92% (01-04-23 @ 08:01) (92% - 92%)    s1s2  b/l air entry  soft, ND  + edema, stasis changes                                                                  10.9   12.18 )-----------( 227      ( 04 Jan 2023 07:05 )             33.4     04 Jan 2023 07:05    137    |  99     |  44     ----------------------------<  99     3.8     |  33     |  1.49     Ca    8.3        04 Jan 2023 07:05    TPro  6.9    /  Alb  2.5    /  TBili  0.6    /  DBili  x      /  AST  34     /  ALT  37     /  AlkPhos  53     04 Jan 2023 07:05    LIVER FUNCTIONS - ( 04 Jan 2023 07:05 )  Alb: 2.5 g/dL / Pro: 6.9 g/dL / ALK PHOS: 53 U/L / ALT: 37 U/L / AST: 34 U/L / GGT: x           acetaminophen     Tablet .. 650 milliGRAM(s) Oral every 6 hours PRN  albuterol    90 MICROgram(s) HFA Inhaler 2 Puff(s) Inhalation every 6 hours PRN  allopurinol 100 milliGRAM(s) Oral daily  apixaban 2.5 milliGRAM(s) Oral every 12 hours  aspirin enteric coated 81 milliGRAM(s) Oral daily  benzonatate 200 milliGRAM(s) Oral three times a day  bisacodyl 5 milliGRAM(s) Oral every 12 hours PRN  budesonide 160 MICROgram(s)/formoterol 4.5 MICROgram(s) Inhaler 2 Puff(s) Inhalation two times a day  fluticasone propionate 50 MICROgram(s)/spray Nasal Spray 1 Spray(s) Both Nostrils two times a day  guaiFENesin  milliGRAM(s) Oral every 12 hours  hemorrhoidal Ointment 1 Application(s) Rectal two times a day PRN  hydrALAZINE 25 milliGRAM(s) Oral every 8 hours  metoprolol succinate ER 50 milliGRAM(s) Oral daily  multivitamin 1 Tablet(s) Oral daily  senna 2 Tablet(s) Oral at bedtime  sodium chloride 0.65% Nasal 1 Spray(s) Both Nostrils every 6 hours PRN  tiotropium 2.5 MICROgram(s) Inhaler 2 Puff(s) Inhalation daily    A/P:    S/p Flu A, b/l PNA  CM, mod MR, TR, EF 45-50%  S/p hemodynamic IRINEO/CKD 3 (Cr 1.16 - 3/10/22)  Improved w/incomplete resolution  UA bland  Renal SONO w/o hydro  Will decrease Lasix to 40mg PO daily  Avoid nephrotoxins  F/u [facility]     [phone]

## 2023-01-04 NOTE — PROGRESS NOTE ADULT - ASSESSMENT
88 year old male with a PMH of CVA, CHF, HTN, afib on eliquis, hip fx s/p fall presented to [facility] on 12/17/22 with acute hypoxic respiratory failure secondary to flash pulmonary edema and acute CHF exacerbation. Also found to be Flu A positive, resulting in multifocal PNA. s/p tamiflu and cefepime. Admitted to [facility] for multidisciplinary rehab for her debility on 1/3/23.      # Debility secondary to acute hypoxic respiratory failure  - multifocal PNA, influenza A  - acute on chronic CHF as below  - CXR 12/31 +infiltrates, improved  - s/p steroid taper and cefepime  - c/w symbicort, nasal spray, albuterol inhaler and tiotropium  - continue chest PT PRN  - Continue comprehensive Rehab Program of PT/OT - 3 hours a day, 5 days a week    # Pulmonary Edema 2/2 acute on chronic CHF  - hold ARB due to poor renal function  - on lasix 40mg bid per renal recs-  - c/w hydralazine and metoprolol  - monitor daily I/Os and weight  - K+ 3.8 Na 137 1/4. Monitor, hospitalist and renal following    # IRINEO on CKD3  - hold ARB, can consider restarting on discharge from rehab as IRINEO resolves  - avoid nephrotoxins,  renally adjusted meds  - BUn/Cr 44/1.49 1/4  - renal following: Cont lasix for now.  May need to accept a higher creatinine.  - BMP 1/5    # chronic Afib  - c/w eliquis 2.5mg bid  - HR 60-79 1/4      # Sleep  - melatonin PRN     #  stage 2 right buttock pressure ulcer.  - Cavilon to periwound, and alyvene foam dressing BID or PRN if soiled/removed    # Pain Mgmt   - Tylenol PRN    # GI/Bowel Mgmt   - Continent c/w Senna  - Preparation H for hemorrhoids    # FEN   - Diet - minced and moist + Thins  [CCHO, DASH/TLC]        # DVT PPX:  - eliquis renally dosed    # LABS  CBC BMP 1/5 88 year old male with a PMH of CVA, CHF, HTN, afib on eliquis, hip fx s/p fall presented to [facility] on 12/17/22 with acute hypoxic respiratory failure secondary to flash pulmonary edema and acute CHF exacerbation. Also found to be Flu A positive, resulting in multifocal PNA. s/p tamiflu and cefepime. Admitted to [facility] for multidisciplinary rehab for her debility on 1/3/23.      # Debility secondary to acute hypoxic respiratory failure  - multifocal PNA, influenza A  - acute on chronic CHF as below  - CXR 12/31 +infiltrates, improved  - s/p steroid taper and cefepime  - c/w symbicort, nasal spray, albuterol inhaler and tiotropium  - continue chest PT PRN  - Continue comprehensive Rehab Program of PT/OT - 3 hours a day, 5 days a week  - Precautions: cardiac, fall, respiratory,. AC    # Pulmonary Edema 2/2 acute on chronic CHF  - hold ARB due to poor renal function  - on lasix 40mg bid per renal recs-  - c/w hydralazine and metoprolol  - monitor daily I/Os and weight  - K+ 3.8 Na 137 1/4. Monitor, hospitalist and renal following    # IRINEO on CKD3  - hold ARB, can consider restarting on discharge from rehab as IRINEO resolves  - avoid nephrotoxins,  renally adjusted meds  - BUn/Cr 44/1.49 1/4  - renal following: Cont lasix for now.  May need to accept a higher creatinine.  - BMP 1/5    # chronic Afib  - c/w eliquis 2.5mg bid  - HR 60-79 1/4      # Sleep  - melatonin PRN     #  stage 2 right buttock pressure ulcer.  - Cavilon to periwound, and alyvene foam dressing BID or PRN if soiled/removed    # Pain Mgmt   - Tylenol PRN    # GI/Bowel Mgmt   - Continent c/w Senna  - Preparation H for hemorrhoids    # FEN   - Diet - minced and moist + Thins  [CCHO, DASH/TLC]        # DVT PPX:  - eliquis renally dosed    # LABS  CBC BMP 1/5

## 2023-01-04 NOTE — DIETITIAN INITIAL EVALUATION ADULT - NS FNS DIET ORDER
on Consistent Carbohydrate DASH-TLC Minced & Moist (IDDSI Level 5/Mechanical Soft/Dysphagia 2) Diet w/ Thin Liquids (IDDSI Level 0)   Recommend Change Diet to Low Sodium Soft & Bite Sized (IDDSI Level 6/Soft/Dysphagia 3) Diet w/ Thin Liquids (IDDSI Level 0)   on Ensure Pudding High Protein 4oz PO BID(Provides 480kcal & 24grams of Protein)  Declines Nutrition Supplementation   Recommend Change Supplement to Ensure Plus High Protein 8oz PO Daily (Provides 350kcal & 20grams of Protein)   Will Trial & Increase if Needed/Accepts  Education Provided on Need for Increased Fluids/Fiber &  Proper Nutrition

## 2023-01-04 NOTE — DIETITIAN NUTRITION RISK NOTIFICATION - TREATMENT: THE FOLLOWING DIET HAS BEEN RECOMMENDED
Recommend Change Diet to Low Sodium Diet   Recommend Initiate Ensure Plus High Protein 8oz PO Daily (Provides 350kcal & 20grams of Protein)

## 2023-01-04 NOTE — CONSULT NOTE ADULT - ASSESSMENT
88 year old male with a PMH of CVA, CHF, HTN, afib on eliquis, hip fx s/p fall and prior intubation for HF exacerbation [facility] admitted to ICU for acute hypoxix respiratory failure, flash pulmonary edema and acute CHF exacerbation also found to be Flu A positive, s/p tamiflu, downgraded to medical floors. Treated for superimposed bacterial pneumonia w/ 7 days cefepime. [facility] for debility from prolonged hospital stay.    # Debility  - PT/OT per PMR, pain control, and bowel regimen.    #multifocal PNA  #influenza A  #Acute hypoxic respiratory failure resolved.  - continue symbicort, nasal spray  - albuterol inhaler and tiotropium  - continue chest PT   - can use O2 intermittently if needed, goal to titrate off completely prior to AR discharge.    # Chronic Combined CHF  - echo 12/17 with EF 45-50%  - cont lasix 40 mg BID, recently inc to twice a day by Renal.  - Hold ARB due to renal function-  was on valsartan in March but has not used since then. can restart valsartan as BP allows.  - continue hydralazine 25 mg q8h, metoprolol succ 50 mg daily  - cardiology OP follow up to optimize meds  - monitor volume status, daily weights, I/Os, replete lytes as needed  - cont medical management, cont asa, eliquis, bb    #CKD 3  - hold ARB, consider restarting on discharge as IRINEO just resolved- was on valsartan in March but has not used since then  - avoid nephrotoxins, renally adjusted meds, monitor BMP  - appreciate Renal consult - d/w Dr. Pineda. Cont lasix for now- May need to accept a higher creatinine.      #Chronic Atrial Fibrillation  - continue metoprolol for rate control, eliquis for stroke ppx --> can inc from 2.5 mg BID to 5 mg BID pending renal fxn.    #History of CVA  - continue ASA    #DVT PPx  - eliquis adjusted to 2.5 BID for renal fxn   88 year old male with a PMH of CVA, CHF, HTN, afib on eliquis, hip fx s/p fall and prior intubation for HF exacerbation presenting to [facility] ED for SOB and CP admitted to ICU for acute hypoxix respiratory failure, flash pulmonary edema and acute CHF exacerbation also found to be Flu A positive, s/p tamiflu, downgraded to medical floors. Treated for superimposed bacterial pneumonia w/ 7 days cefepime. Now at [facility] AR for debility from prolonged hospital stay.    # Debility  - PT/OT per PMR, pain control, and bowel regimen.    #Shortness of breath  #multifocal PNA  #influenza A  #Acute hypoxic respiratory failure -resolved.  - continue symbicort, nasal spray  - albuterol inhaler and tiotropium  - continue chest PT , incentive spirometry ordered  - can use O2 intermittently if needed, goal to titrate off completely prior to AR discharge.    # Chronic Combined CHF  -appears euvolemic at present.  - echo 12/17 with EF 45-50%  - cont lasix 40 mg BID, recently inc to twice a day by Renal.  - Hold ARB due to renal function-  was on valsartan in March but has not used since then.   - continue hydralazine 25 mg q8h, metoprolol succ 50 mg daily  - cardiology OP follow up to optimize meds  - monitor volume status, daily weights, I/Os, replete lytes as needed  - cont medical management, cont asa, eliquis, bb    #CKD 3  - hold ARB due to CKD- was on valsartan in March but has not used since then  - avoid nephrotoxins, renally adjusted meds, monitor BMP  - appreciate Renal consult - d/w Dr. Pineda. Cont lasix for now- May need to accept a higher creatinine.      #Chronic Atrial Fibrillation  - continue metoprolol for rate control, eliquis for stroke ppx --> can inc from 2.5 mg BID to 5 mg BID pending renal fxn.    #History of CVA  - continue ASA    # Tobacco use disorder  The patient is an active smoker of cigars. The patient was advised to quit. The patient was informed of risks associated w/ smoking. The options for assistance with smoking cessation were reviewed with the patient. The smoking cessation educational materials order set was ordered. The patient declined smoking cessation medications.   spent 11 min on counseling.    # Constipation  - recommend bowel regimen.     #DVT PPx  - eliquis adjusted to 2.5 BID for renal fxn

## 2023-01-04 NOTE — DIETITIAN INITIAL EVALUATION ADULT - ADD RECOMMEND
1) Monitor Weights, Intake, Tolerance, Skin & Labwork  2) Recommend Change Diet to Low Sodium Soft & Bite Sized (IDDSI Level 6/Soft/Dysphagia 3) Diet   3) Recommend Change Supplement to Ensure Plus High Protein 8oz PO Daily (Provides 350kcal & 20grams of Protein)   4) Education Provided on Need for Increased Fluids/Fiber &  Proper Nutrition   5) Multivitamin Daily  6) Continue Nutrition Plan of Care

## 2023-01-04 NOTE — PROGRESS NOTE ADULT - SUBJECTIVE AND OBJECTIVE BOX
Patient is a 88y old  Male who presents with a chief complaint of respiratory failure,  Debility (2023 07:20)      HPI:  88 year old male with a PMH of CVA, CHF, HTN, afib on eliquis, hip fx s/p fall and prior intubation for HF exacerbation presenting to [facility] ED for SOB and CP admitted to ICU at [facility] on 22 for acute hypoxic respiratory failure, flash pulmonary edema and acute CHF exacerbation. Also found to be Flu A positive, resulting in multifocal PNA. Pt was on BIPAP, weaned down to NC 4L. Tolerated well. Troponins were mildly elevatd, but stablized, likely demand ischemia. Pt initially treated w/ tamiflu for influenza A, later developed multifocal pneunonia, neg for MRSA and legionella. ID recommended Cefepime, which patient finished last dose of on 1/3/23. Patient downgraded to medicine on 22. Seen by PM&R, PT, OT who recommended acute rehab. Patient admitted to [facility] for acute rehab on 1/3/23.     (2023 13:38)      PAST MEDICAL & SURGICAL HISTORY:  Afib      Congestive heart failure (CHF)      CVA (cerebral vascular accident)      Hypertension, unspecified type      No significant past surgical history          MEDICATIONS  (STANDING):  allopurinol 100 milliGRAM(s) Oral daily  apixaban 2.5 milliGRAM(s) Oral every 12 hours  aspirin enteric coated 81 milliGRAM(s) Oral daily  benzonatate 200 milliGRAM(s) Oral three times a day  budesonide 160 MICROgram(s)/formoterol 4.5 MICROgram(s) Inhaler 2 Puff(s) Inhalation two times a day  fluticasone propionate 50 MICROgram(s)/spray Nasal Spray 1 Spray(s) Both Nostrils two times a day  furosemide    Tablet 40 milliGRAM(s) Oral two times a day  guaiFENesin  milliGRAM(s) Oral every 12 hours  hydrALAZINE 25 milliGRAM(s) Oral every 8 hours  metoprolol succinate ER 50 milliGRAM(s) Oral daily  senna 2 Tablet(s) Oral at bedtime  tiotropium 2.5 MICROgram(s) Inhaler 2 Puff(s) Inhalation daily    MEDICATIONS  (PRN):  acetaminophen     Tablet .. 650 milliGRAM(s) Oral every 6 hours PRN Temp greater or equal to 38C (100.4F), Mild Pain (1 - 3), Moderate Pain (4 - 6)  albuterol    90 MICROgram(s) HFA Inhaler 2 Puff(s) Inhalation every 6 hours PRN Shortness of Breath  bisacodyl 5 milliGRAM(s) Oral every 12 hours PRN Constipation  hemorrhoidal Ointment 1 Application(s) Rectal two times a day PRN rectal itching/ pain  sodium chloride 0.65% Nasal 1 Spray(s) Both Nostrils every 6 hours PRN Nasal Congestion      Allergies    No Known Allergies    Intolerances          VITALS  88y  Vital Signs Last 24 Hrs  T(C): 36.7 (2023 08:01), Max: 37 (2023 19:38)  T(F): 98.1 (2023 08:01), Max: 98.6 (2023 19:38)  HR: 79 (2023 08:01) (60 - 79)  BP: 147/75 (2023 08:01) (133/67 - 150/69)  BP(mean): --  RR: 16 (2023 08:01) (16 - 17)  SpO2: 92% (2023 08:01) (92% - 96%)    Parameters below as of 2023 08:01  Patient On (Oxygen Delivery Method): room air      Daily Height in cm: 167.64 (2023 16:25)    Daily Weight in k.6 (2023 06:34)        RECENT LABS:                          10.9   12.18 )-----------( 227      ( 2023 07:05 )             33.4     01-04    137  |  99  |  44<H>  ----------------------------<  99  3.8   |  33<H>  |  1.49<H>    Ca    8.3<L>      2023 07:05    TPro  6.9  /  Alb  2.5<L>  /  TBili  0.6  /  DBili  x   /  AST  34  /  ALT  37  /  AlkPhos  53  -04    LIVER FUNCTIONS - ( 2023 07:05 )  Alb: 2.5 g/dL / Pro: 6.9 g/dL / ALK PHOS: 53 U/L / ALT: 37 U/L / AST: 34 U/L / GGT: x                   CAPILLARY BLOOD GLUCOSE

## 2023-01-04 NOTE — DIETITIAN INITIAL EVALUATION ADULT - EDUCATION DIETARY MODIFICATIONS
Education Provided on Need for Increased Fluids/Fiber &  Proper Nutrition/(2) meets goals/outcomes/verbalization

## 2023-01-04 NOTE — DIETITIAN INITIAL EVALUATION ADULT - SIGNS/SYMPTOMS
Poor PO Intake, Significant Weight Decrease & Fat Depletion/Muscle Wasting Observed  Stage 2 Pressure Ulcer

## 2023-01-04 NOTE — DIETITIAN INITIAL EVALUATION ADULT - ORAL NUTRITION SUPPLEMENTS
Recommend D/C Ensure Pudding High Protein 4oz PO BID - Declines Nutrition Supplementation   Recommend Initiate Ensure Plus High Protein 8oz PO Daily (Provides 350kcal & 20grams of Protein)

## 2023-01-04 NOTE — PROGRESS NOTE ADULT - COMMENTS
Patient sittingi n wheelchair . Feels fatigued, no respiratory distress. Has not started breakfast yet but denies difficulty coordinating respirations with meals. He does complain of baseline balance difficulties which has worsened after hospitalization; aware he had PNA. His spirits and mood seem good, and he jokes with team. No fever, NAD

## 2023-01-04 NOTE — DIETITIAN INITIAL EVALUATION ADULT - REASON INDICATOR FOR ASSESSMENT
Initial Assessment - Registered Nurse Consult - 24hr - Pressure Ulcers     Daughter @Bedside & Provided Information

## 2023-01-04 NOTE — DIETITIAN INITIAL EVALUATION ADULT - PERTINENT LABORATORY DATA
01-04    137  |  99  |  44<H>  ----------------------------<  99  3.8   |  33<H>  |  1.49<H>    Ca    8.3<L>      04 Jan 2023 07:05    TPro  6.9  /  Alb  2.5<L>  /  TBili  0.6  /  DBili  x   /  AST  34  /  ALT  37  /  AlkPhos  53  01-04

## 2023-01-04 NOTE — DIETITIAN INITIAL EVALUATION ADULT - OTHER INFO
Initial Nutrition Assessment   88yr Old Male   Denies Food Allergy/Intolerance  Tolerates Diet Well - No Chewing/Swallowing Complications (Per Patient)  Stage 2 Pressure Ulcers on Right Buttock (as Per Nursing Flow Sheets)  +3 Edema Noted to Feet (as Per Nursing Flow Sheets)  No Recent Nausea/Vomiting/Diarrhea & Some Recent Constipation (as Per Patient)

## 2023-01-04 NOTE — DIETITIAN INITIAL EVALUATION ADULT - ORAL INTAKE PTA/DIET HISTORY
Patient Does Not Follow Diet @Home But Limits Sodium Intake  Consumes 2 Meals a Day Usually  Family Usually Cooks For Patient   Does Take Vitamin/Supplements @Home (Boost & Multivitamin)

## 2023-01-04 NOTE — DIETITIAN INITIAL EVALUATION ADULT - PERTINENT MEDS FT
MEDICATIONS  (STANDING):  allopurinol 100 milliGRAM(s) Oral daily  apixaban 2.5 milliGRAM(s) Oral every 12 hours  aspirin enteric coated 81 milliGRAM(s) Oral daily  benzonatate 200 milliGRAM(s) Oral three times a day  bisacodyl Suppository 10 milliGRAM(s) Rectal once  budesonide 160 MICROgram(s)/formoterol 4.5 MICROgram(s) Inhaler 2 Puff(s) Inhalation two times a day  fluticasone propionate 50 MICROgram(s)/spray Nasal Spray 1 Spray(s) Both Nostrils two times a day  guaiFENesin  milliGRAM(s) Oral every 12 hours  hydrALAZINE 25 milliGRAM(s) Oral every 8 hours  metoprolol succinate ER 50 milliGRAM(s) Oral daily  multivitamin 1 Tablet(s) Oral daily  senna 2 Tablet(s) Oral at bedtime  tiotropium 2.5 MICROgram(s) Inhaler 2 Puff(s) Inhalation daily    MEDICATIONS  (PRN):  acetaminophen     Tablet .. 650 milliGRAM(s) Oral every 6 hours PRN Temp greater or equal to 38C (100.4F), Mild Pain (1 - 3), Moderate Pain (4 - 6)  albuterol    90 MICROgram(s) HFA Inhaler 2 Puff(s) Inhalation every 6 hours PRN Shortness of Breath  bisacodyl 5 milliGRAM(s) Oral every 12 hours PRN Constipation  hemorrhoidal Ointment 1 Application(s) Rectal two times a day PRN rectal itching/ pain  sodium chloride 0.65% Nasal 1 Spray(s) Both Nostrils every 6 hours PRN Nasal Congestion

## 2023-01-04 NOTE — PROGRESS NOTE ADULT - MOTOR
left LE 2+ pitting edemea pretibial but compressible  no erythema or warmth no TTP  right LE 1+ edema  ("from heart failure")

## 2023-01-04 NOTE — DIETITIAN INITIAL EVALUATION ADULT - FACTORS AFF FOOD INTAKE
States Poor PO Intake over Last Week  Eating Less than Prior to Admission to Hospital (Per Patient & Family)/persistent lack of appetite

## 2023-01-04 NOTE — DIETITIAN INITIAL EVALUATION ADULT - NSFNSNUTRCHEWSWALLOWFT_GEN_A_CORE
Tolerates Diet Consistency Well  Tolerates Fluid Consistency Well  No Chewing/Swallowing Difficulties  (Per Patient)   States Previously Put on Minced & Moist (IDDSI Level 5/Mechanical Soft/Dysphagia 2) Diet Per Request

## 2023-01-04 NOTE — PROGRESS NOTE ADULT - CONSTITUTIONAL COMMENTS
alert +dysarthric +expressive difficulties and word finding errors No dyspnea on conversation but some dysphonia

## 2023-01-05 LAB
ALBUMIN SERPL ELPH-MCNC: 2.4 G/DL — LOW (ref 3.3–5)
ALP SERPL-CCNC: 54 U/L — SIGNIFICANT CHANGE UP (ref 40–120)
ALT FLD-CCNC: 28 U/L — SIGNIFICANT CHANGE UP (ref 10–45)
ANION GAP SERPL CALC-SCNC: 7 MMOL/L — SIGNIFICANT CHANGE UP (ref 5–17)
AST SERPL-CCNC: 31 U/L — SIGNIFICANT CHANGE UP (ref 10–40)
BASOPHILS # BLD AUTO: 0.04 K/UL — SIGNIFICANT CHANGE UP (ref 0–0.2)
BASOPHILS NFR BLD AUTO: 0.4 % — SIGNIFICANT CHANGE UP (ref 0–2)
BILIRUB SERPL-MCNC: 0.6 MG/DL — SIGNIFICANT CHANGE UP (ref 0.2–1.2)
BUN SERPL-MCNC: 45 MG/DL — HIGH (ref 7–23)
CALCIUM SERPL-MCNC: 8.3 MG/DL — LOW (ref 8.4–10.5)
CHLORIDE SERPL-SCNC: 101 MMOL/L — SIGNIFICANT CHANGE UP (ref 96–108)
CO2 SERPL-SCNC: 29 MMOL/L — SIGNIFICANT CHANGE UP (ref 22–31)
CREAT SERPL-MCNC: 1.4 MG/DL — HIGH (ref 0.5–1.3)
EGFR: 48 ML/MIN/1.73M2 — LOW
EOSINOPHIL # BLD AUTO: 0.08 K/UL — SIGNIFICANT CHANGE UP (ref 0–0.5)
EOSINOPHIL NFR BLD AUTO: 0.8 % — SIGNIFICANT CHANGE UP (ref 0–6)
GLUCOSE SERPL-MCNC: 99 MG/DL — SIGNIFICANT CHANGE UP (ref 70–99)
HCT VFR BLD CALC: 32.8 % — LOW (ref 39–50)
HGB BLD-MCNC: 10.7 G/DL — LOW (ref 13–17)
IMM GRANULOCYTES NFR BLD AUTO: 2.6 % — HIGH (ref 0–0.9)
LYMPHOCYTES # BLD AUTO: 1.81 K/UL — SIGNIFICANT CHANGE UP (ref 1–3.3)
LYMPHOCYTES # BLD AUTO: 17.7 % — SIGNIFICANT CHANGE UP (ref 13–44)
MCHC RBC-ENTMCNC: 31.8 PG — SIGNIFICANT CHANGE UP (ref 27–34)
MCHC RBC-ENTMCNC: 32.6 GM/DL — SIGNIFICANT CHANGE UP (ref 32–36)
MCV RBC AUTO: 97.3 FL — SIGNIFICANT CHANGE UP (ref 80–100)
MONOCYTES # BLD AUTO: 1.92 K/UL — HIGH (ref 0–0.9)
MONOCYTES NFR BLD AUTO: 18.8 % — HIGH (ref 2–14)
NEUTROPHILS # BLD AUTO: 6.08 K/UL — SIGNIFICANT CHANGE UP (ref 1.8–7.4)
NEUTROPHILS NFR BLD AUTO: 59.7 % — SIGNIFICANT CHANGE UP (ref 43–77)
NRBC # BLD: 0 /100 WBCS — SIGNIFICANT CHANGE UP (ref 0–0)
PLATELET # BLD AUTO: 192 K/UL — SIGNIFICANT CHANGE UP (ref 150–400)
POTASSIUM SERPL-MCNC: 3.7 MMOL/L — SIGNIFICANT CHANGE UP (ref 3.5–5.3)
POTASSIUM SERPL-SCNC: 3.7 MMOL/L — SIGNIFICANT CHANGE UP (ref 3.5–5.3)
PROT SERPL-MCNC: 6.6 G/DL — SIGNIFICANT CHANGE UP (ref 6–8.3)
RBC # BLD: 3.37 M/UL — LOW (ref 4.2–5.8)
RBC # FLD: 13.9 % — SIGNIFICANT CHANGE UP (ref 10.3–14.5)
SODIUM SERPL-SCNC: 137 MMOL/L — SIGNIFICANT CHANGE UP (ref 135–145)
WBC # BLD: 10.2 K/UL — SIGNIFICANT CHANGE UP (ref 3.8–10.5)
WBC # FLD AUTO: 10.2 K/UL — SIGNIFICANT CHANGE UP (ref 3.8–10.5)

## 2023-01-05 PROCEDURE — 99232 SBSQ HOSP IP/OBS MODERATE 35: CPT

## 2023-01-05 RX ORDER — PANTOPRAZOLE SODIUM 20 MG/1
40 TABLET, DELAYED RELEASE ORAL
Refills: 0 | Status: DISCONTINUED | OUTPATIENT
Start: 2023-01-05 | End: 2023-01-12

## 2023-01-05 RX ADMIN — Medication 25 MILLIGRAM(S): at 22:18

## 2023-01-05 RX ADMIN — Medication 40 MILLIGRAM(S): at 05:13

## 2023-01-05 RX ADMIN — Medication 25 MILLIGRAM(S): at 15:51

## 2023-01-05 RX ADMIN — Medication 1 TABLET(S): at 11:22

## 2023-01-05 RX ADMIN — Medication 81 MILLIGRAM(S): at 11:22

## 2023-01-05 RX ADMIN — Medication 200 MILLIGRAM(S): at 05:14

## 2023-01-05 RX ADMIN — BUDESONIDE AND FORMOTEROL FUMARATE DIHYDRATE 2 PUFF(S): 160; 4.5 AEROSOL RESPIRATORY (INHALATION) at 08:08

## 2023-01-05 RX ADMIN — APIXABAN 2.5 MILLIGRAM(S): 2.5 TABLET, FILM COATED ORAL at 05:13

## 2023-01-05 RX ADMIN — Medication 50 MILLIGRAM(S): at 05:12

## 2023-01-05 RX ADMIN — Medication 100 MILLIGRAM(S): at 11:22

## 2023-01-05 RX ADMIN — TIOTROPIUM BROMIDE 2 PUFF(S): 18 CAPSULE ORAL; RESPIRATORY (INHALATION) at 08:09

## 2023-01-05 RX ADMIN — Medication 600 MILLIGRAM(S): at 17:17

## 2023-01-05 RX ADMIN — SENNA PLUS 2 TABLET(S): 8.6 TABLET ORAL at 22:18

## 2023-01-05 RX ADMIN — Medication 25 MILLIGRAM(S): at 05:12

## 2023-01-05 RX ADMIN — Medication 200 MILLIGRAM(S): at 22:17

## 2023-01-05 RX ADMIN — Medication 600 MILLIGRAM(S): at 05:13

## 2023-01-05 RX ADMIN — APIXABAN 2.5 MILLIGRAM(S): 2.5 TABLET, FILM COATED ORAL at 17:17

## 2023-01-05 NOTE — PROGRESS NOTE ADULT - SUBJECTIVE AND OBJECTIVE BOX
Patient is a 88y old  Male who presents with a chief complaint of respiratory failure,  Debility (05 Jan 2023 10:30)    Patient seen and examined at bedside. Poor sleep overnight. dry cough   difficult to understand due to dysarthria     ALLERGIES:  No Known Allergies    MEDICATIONS  (STANDING):  allopurinol 100 milliGRAM(s) Oral daily  apixaban 2.5 milliGRAM(s) Oral every 12 hours  aspirin enteric coated 81 milliGRAM(s) Oral daily  benzonatate 200 milliGRAM(s) Oral three times a day  budesonide 160 MICROgram(s)/formoterol 4.5 MICROgram(s) Inhaler 2 Puff(s) Inhalation two times a day  fluticasone propionate 50 MICROgram(s)/spray Nasal Spray 1 Spray(s) Both Nostrils two times a day  furosemide    Tablet 40 milliGRAM(s) Oral daily  guaiFENesin  milliGRAM(s) Oral every 12 hours  hydrALAZINE 25 milliGRAM(s) Oral every 8 hours  metoprolol succinate ER 50 milliGRAM(s) Oral daily  multivitamin 1 Tablet(s) Oral daily  senna 2 Tablet(s) Oral at bedtime  tiotropium 2.5 MICROgram(s) Inhaler 2 Puff(s) Inhalation daily    MEDICATIONS  (PRN):  acetaminophen     Tablet .. 650 milliGRAM(s) Oral every 6 hours PRN Temp greater or equal to 38C (100.4F), Mild Pain (1 - 3), Moderate Pain (4 - 6)  albuterol    90 MICROgram(s) HFA Inhaler 2 Puff(s) Inhalation every 6 hours PRN Shortness of Breath  bisacodyl 5 milliGRAM(s) Oral every 12 hours PRN Constipation  hemorrhoidal Ointment 1 Application(s) Rectal two times a day PRN rectal itching/ pain  sodium chloride 0.65% Nasal 1 Spray(s) Both Nostrils every 6 hours PRN Nasal Congestion    Vital Signs Last 24 Hrs  T(F): 98.1 (05 Jan 2023 08:28), Max: 98.1 (05 Jan 2023 08:28)  HR: 83 (05 Jan 2023 08:28) (68 - 83)  BP: 145/78 (05 Jan 2023 08:28) (145/78 - 157/73)  RR: 16 (05 Jan 2023 08:28) (16 - 16)  SpO2: 94% (05 Jan 2023 08:28) (94% - 97%)  I&O's Summary    BMI (kg/m2): 26.2 (01-03-23 @ 16:25)    PHYSICAL EXAM:  General: NAD, awake, alert. +dysarthria   ENT: MMM, no tonsilar exudate  Neck: Supple, No JVD  Lungs: Clear to auscultation bilaterally, no wheezes. Good air entry bilaterally   Cardio: RRR, S1/S2, No murmurs  Abdomen: Soft, Nontender, Nondistended; Bowel sounds present  Extremities: No calf tenderness, edema on b/l LE L>R    LABS:                        10.7   10.20 )-----------( 192      ( 05 Jan 2023 06:35 )             32.8       01-05    137  |  101  |  45  ----------------------------<  99  3.7   |  29  |  1.40    Ca    8.3      05 Jan 2023 06:35    TPro  6.6  /  Alb  2.4  /  TBili  0.6  /  DBili  x   /  AST  31  /  ALT  28  /  AlkPhos  54  01-05     COVID-19 PCR: NotDetec (01-02-23 @ 10:55)  COVID-19 PCR: NotDetec (01-02-23 @ 06:00)    RADIOLOGY & ADDITIONAL TESTS:     Care Discussed with Consultants/Other Providers:

## 2023-01-05 NOTE — PROGRESS NOTE ADULT - SUBJECTIVE AND OBJECTIVE BOX
No distress    Vital Signs Last 24 Hrs  T(C): 36.7 (01-05-23 @ 08:28), Max: 36.7 (01-05-23 @ 08:28)  T(F): 98.1 (01-05-23 @ 08:28), Max: 98.1 (01-05-23 @ 08:28)  HR: 83 (01-05-23 @ 08:28) (68 - 83)  BP: 145/78 (01-05-23 @ 08:28) (145/78 - 157/73)  RR: 16 (01-05-23 @ 08:28) (16 - 16)  SpO2: 98% (01-05-23 @ 10:14) (94% - 98%)    s1s2  b/l air entry  soft, ND  + edema, stasis changes                                                                           10.7   10.20 )-----------( 192      ( 05 Jan 2023 06:35 )             32.8     05 Jan 2023 06:35    137    |  101    |  45     ----------------------------<  99     3.7     |  29     |  1.40     Ca    8.3        05 Jan 2023 06:35    TPro  6.6    /  Alb  2.4    /  TBili  0.6    /  DBili  x      /  AST  31     /  ALT  28     /  AlkPhos  54     05 Jan 2023 06:35    LIVER FUNCTIONS - ( 05 Jan 2023 06:35 )  Alb: 2.4 g/dL / Pro: 6.6 g/dL / ALK PHOS: 54 U/L / ALT: 28 U/L / AST: 31 U/L / GGT: x           acetaminophen     Tablet .. 650 milliGRAM(s) Oral every 6 hours PRN  albuterol    90 MICROgram(s) HFA Inhaler 2 Puff(s) Inhalation every 6 hours PRN  allopurinol 100 milliGRAM(s) Oral daily  apixaban 2.5 milliGRAM(s) Oral every 12 hours  aspirin enteric coated 81 milliGRAM(s) Oral daily  benzonatate 200 milliGRAM(s) Oral three times a day  bisacodyl 5 milliGRAM(s) Oral every 12 hours PRN  budesonide 160 MICROgram(s)/formoterol 4.5 MICROgram(s) Inhaler 2 Puff(s) Inhalation two times a day  fluticasone propionate 50 MICROgram(s)/spray Nasal Spray 1 Spray(s) Both Nostrils two times a day  furosemide    Tablet 40 milliGRAM(s) Oral daily  guaiFENesin  milliGRAM(s) Oral every 12 hours  hemorrhoidal Ointment 1 Application(s) Rectal two times a day PRN  hydrALAZINE 25 milliGRAM(s) Oral every 8 hours  metoprolol succinate ER 50 milliGRAM(s) Oral daily  multivitamin 1 Tablet(s) Oral daily  pantoprazole    Tablet 40 milliGRAM(s) Oral before breakfast  senna 2 Tablet(s) Oral at bedtime  sodium chloride 0.65% Nasal 1 Spray(s) Both Nostrils every 6 hours PRN  tiotropium 2.5 MICROgram(s) Inhaler 2 Puff(s) Inhalation daily    A/P:    S/p Flu A, b/l PNA  CM, mod MR, TR, EF 45-50%  S/p hemodynamic IRINEO/CKD 3 (Cr 1.16 - 3/10/22)  Improved w/incomplete resolution  UA bland  Renal SONO w/o hydro  Continue Lasix 40mg PO daily  Avoid nephrotoxins  F/u BMP     [phone]

## 2023-01-05 NOTE — PROGRESS NOTE ADULT - SUBJECTIVE AND OBJECTIVE BOX
HPI:  88 year old male with a PMH of CVA, CHF, HTN, afib on eliquis, hip fx s/p fall and prior intubation for HF exacerbation presenting to [facility] ED for SOB and CP admitted to ICU at [facility] on 12/17/22 for acute hypoxic respiratory failure, flash pulmonary edema and acute CHF exacerbation. Also found to be Flu A positive, resulting in multifocal PNA. Pt was on BIPAP, weaned down to NC 4L. Tolerated well. Troponins were mildly elevatd, but stablized, likely demand ischemia. Pt initially treated w/ tamiflu for influenza A, later developed multifocal pneunonia, neg for MRSA and legionella. ID recommended Cefepime, which patient finished last dose of on 1/3/23. Patient downgraded to medicine on 12/21/22. Seen by PM&R, PT, OT who recommended acute rehab. Patient admitted to [facility] for acute rehab on 1/3/23.      VITALS  Vital Signs Last 24 Hrs  T(C): 36.7 (05 Jan 2023 08:28), Max: 36.7 (05 Jan 2023 08:28)  T(F): 98.1 (05 Jan 2023 08:28), Max: 98.1 (05 Jan 2023 08:28)  HR: 83 (05 Jan 2023 08:28) (68 - 83)  BP: 145/78 (05 Jan 2023 08:28) (145/78 - 157/73)  BP(mean): --  RR: 16 (05 Jan 2023 08:28) (16 - 16)  SpO2: 94% (05 Jan 2023 08:28) (94% - 97%)    Parameters below as of 05 Jan 2023 08:28  Patient On (Oxygen Delivery Method): room air      RECENT LABS                        10.7   10.20 )-----------( 192      ( 05 Jan 2023 06:35 )             32.8     01-05    137  |  101  |  45<H>  ----------------------------<  99  3.7   |  29  |  1.40<H>    Ca    8.3<L>      05 Jan 2023 06:35    TPro  6.6  /  Alb  2.4<L>  /  TBili  0.6  /  DBili  x   /  AST  31  /  ALT  28  /  AlkPhos  54  01-05      LIVER FUNCTIONS - ( 05 Jan 2023 06:35 )  Alb: 2.4 g/dL / Pro: 6.6 g/dL / ALK PHOS: 54 U/L / ALT: 28 U/L / AST: 31 U/L / GGT: x             CURRENT MEDICATIONS  MEDICATIONS  (STANDING):  allopurinol 100 milliGRAM(s) Oral daily  apixaban 2.5 milliGRAM(s) Oral every 12 hours  aspirin enteric coated 81 milliGRAM(s) Oral daily  benzonatate 200 milliGRAM(s) Oral three times a day  budesonide 160 MICROgram(s)/formoterol 4.5 MICROgram(s) Inhaler 2 Puff(s) Inhalation two times a day  fluticasone propionate 50 MICROgram(s)/spray Nasal Spray 1 Spray(s) Both Nostrils two times a day  furosemide    Tablet 40 milliGRAM(s) Oral daily  guaiFENesin  milliGRAM(s) Oral every 12 hours  hydrALAZINE 25 milliGRAM(s) Oral every 8 hours  metoprolol succinate ER 50 milliGRAM(s) Oral daily  multivitamin 1 Tablet(s) Oral daily  senna 2 Tablet(s) Oral at bedtime  tiotropium 2.5 MICROgram(s) Inhaler 2 Puff(s) Inhalation daily    MEDICATIONS  (PRN):  acetaminophen     Tablet .. 650 milliGRAM(s) Oral every 6 hours PRN Temp greater or equal to 38C (100.4F), Mild Pain (1 - 3), Moderate Pain (4 - 6)  albuterol    90 MICROgram(s) HFA Inhaler 2 Puff(s) Inhalation every 6 hours PRN Shortness of Breath  bisacodyl 5 milliGRAM(s) Oral every 12 hours PRN Constipation  hemorrhoidal Ointment 1 Application(s) Rectal two times a day PRN rectal itching/ pain  sodium chloride 0.65% Nasal 1 Spray(s) Both Nostrils every 6 hours PRN Nasal Congestion        Review of Systems:   · Additional ROS	NAD in chair. Night uneventful, no respiratory distress. No fever, denies any sob. Some cough reported-non productive. No chest pain, no NV, moved BM.     Physical Exam:   · Constitutional Comments	alert +dysarthric +expressive difficulties and word finding errors No dyspnea on conversation but some dysphonia  · Respiratory	no respiratory distress  · Respiratory Comments	fair effort, no wheezing or rhonchi  · Cardiovascular	regular rate and rhythm; S1 S2 present; no gallops; no rub; no murmur  · Gastrointestinal	soft; nontender; nondistended; normal active bowel sounds  · Motor	left LE 2+ pitting edeme pretibial but compressible  no erythema or warmth no TTP  right LE 1+ edema    Continue comprehensive acute rehab program consisting of 3hrs/day of OT/PT and SLP.

## 2023-01-05 NOTE — PROGRESS NOTE ADULT - ASSESSMENT
88 year old male with a PMH of CVA, CHF, HTN, afib on eliquis, hip fx s/p fall and prior intubation for HF exacerbation presenting to [facility] ED for SOB and CP admitted to ICU for acute hypoxix respiratory failure, flash pulmonary edema and acute CHF exacerbation also found to be Flu A positive, s/p tamiflu, downgraded to medical floors. Treated for superimposed bacterial pneumonia w/ 7 days cefepime. Now at [facility] AR for debility from prolonged hospital stay.    # Debility  - PT/OT per PMR, pain control, and bowel regimen.    #Shortness of breath  #multifocal PNA  #influenza A  #Acute hypoxic respiratory failure -resolved.  - continue symbicort, nasal spray  - albuterol inhaler and tiotropium  - continue chest PT    - can use O2 intermittently if needed, goal to titrate off completely prior to AR discharge.    # Chronic Combined CHF  -appears euvolemic at present.  - echo 12/17 with EF 45-50%  - cont lasix 40 mg BID, recently inc to twice a day by Renal.  - Hold ARB due to renal function-  was on valsartan in March but has not used since then.   - continue hydralazine 25 mg q8h, metoprolol succ 50 mg daily  - cardiology OP follow up to optimize meds  - monitor volume status, daily weights, I/Os, replete lytes as needed  - cont medical management, cont asa, eliquis, bb    #CKD 3  - hold ARB due to CKD- was on valsartan in March but has not used since then  - avoid nephrotoxins, renally adjusted meds, monitor BMP  - appreciate Renal consult - d/w Dr. Pineda. Cont lasix for now- May need to accept a higher creatinine.      #Chronic Atrial Fibrillation  - continue metoprolol for rate control, eliquis 2.5mg bid. If renal function stable (Cr >1.5) will re-adjust dose to 5mg bid    #History of CVA  - continue ASA    # Constipation  - recommend bowel regimen.     #DVT PPx  - eliquis adjusted to 2.5 BID for renal fxn

## 2023-01-05 NOTE — PROGRESS NOTE ADULT - ASSESSMENT
88 year old male with a PMH of CVA, CHF, HTN, afib on eliquis, hip fx s/p fall presented to [facility] on 12/17/22 with acute hypoxic respiratory failure secondary to flash pulmonary edema and acute CHF exacerbation. Also found to be Flu A positive, resulting in multifocal PNA. s/p tamiflu and cefepime. Admitted to [facility] for multidisciplinary rehab for her debility on 1/3/23.      # Debility secondary to acute hypoxic respiratory failure  - multifocal PNA, influenza A  - acute on chronic CHF-lasix daily per renal, no HER  - CXR 12/31 +infiltrates, improved, afebrile, tolerating therapy  - s/p steroid taper. Cefepime completed  - c/w symbicort, nasal spray, albuterol inhaler and tiotropium  - continue chest PT PRN  - Continue comprehensive Rehab Program of PT/OT - 3 hours a day, 5 days a week  - Precautions: cardiac, fall, respiratory,. AC    # Pulmonary Edema 2/2 acute on chronic CHF  - hold ARB due to poor renal function  - on lasix 40mg daily per renal recs-  - c/w hydralazine and metoprolol  - monitor daily I/Os and weight  - Monitor, hospitalist and renal following  - US LEs showed edema prior    # IRINEO on CKD3  - hold ARB, can consider restarting on discharge from rehab as IRINEO resolves  - avoid nephrotoxins,  renally adjusted meds  - renal following: Cont lasix for now as daily.    # chronic Afib  - c/w eliquis 2.5mg bid  -GI ppx per renal-will request recommendation    # Sleep  - melatonin PRN     #  stage 2 right buttock pressure ulcer.  - Cavilon to periwound, and alyvene foam dressing BID or PRN if soiled/removed    # Pain Mgmt   - Tylenol PRN    # GI/Bowel Mgmt   - Continent c/w Senna  - Preparation H for hemorrhoids    # FEN   - Diet - minced and moist + Thins  [CCHO, DASH/TLC]        # DVT PPX:  - eliquis renally dosed

## 2023-01-06 LAB
ANION GAP SERPL CALC-SCNC: 7 MMOL/L — SIGNIFICANT CHANGE UP (ref 5–17)
BUN SERPL-MCNC: 44 MG/DL — HIGH (ref 7–23)
CALCIUM SERPL-MCNC: 8.2 MG/DL — LOW (ref 8.4–10.5)
CHLORIDE SERPL-SCNC: 101 MMOL/L — SIGNIFICANT CHANGE UP (ref 96–108)
CO2 SERPL-SCNC: 30 MMOL/L — SIGNIFICANT CHANGE UP (ref 22–31)
CREAT SERPL-MCNC: 1.51 MG/DL — HIGH (ref 0.5–1.3)
EGFR: 44 ML/MIN/1.73M2 — LOW
GLUCOSE SERPL-MCNC: 99 MG/DL — SIGNIFICANT CHANGE UP (ref 70–99)
POTASSIUM SERPL-MCNC: 3.8 MMOL/L — SIGNIFICANT CHANGE UP (ref 3.5–5.3)
POTASSIUM SERPL-SCNC: 3.8 MMOL/L — SIGNIFICANT CHANGE UP (ref 3.5–5.3)
SODIUM SERPL-SCNC: 138 MMOL/L — SIGNIFICANT CHANGE UP (ref 135–145)

## 2023-01-06 PROCEDURE — 99232 SBSQ HOSP IP/OBS MODERATE 35: CPT

## 2023-01-06 PROCEDURE — 93970 EXTREMITY STUDY: CPT | Mod: 26

## 2023-01-06 RX ORDER — MENTHOL AND METHYL SALICYLATE 10; 30 G/100G; G/100G
1 CREAM TOPICAL
Refills: 0 | Status: DISCONTINUED | OUTPATIENT
Start: 2023-01-06 | End: 2023-01-12

## 2023-01-06 RX ORDER — FUROSEMIDE 40 MG
40 TABLET ORAL
Refills: 0 | Status: DISCONTINUED | OUTPATIENT
Start: 2023-01-06 | End: 2023-01-08

## 2023-01-06 RX ADMIN — Medication 25 MILLIGRAM(S): at 21:40

## 2023-01-06 RX ADMIN — Medication 200 MILLIGRAM(S): at 21:40

## 2023-01-06 RX ADMIN — Medication 100 MILLIGRAM(S): at 11:53

## 2023-01-06 RX ADMIN — Medication 1 TABLET(S): at 11:53

## 2023-01-06 RX ADMIN — PANTOPRAZOLE SODIUM 40 MILLIGRAM(S): 20 TABLET, DELAYED RELEASE ORAL at 05:59

## 2023-01-06 RX ADMIN — Medication 25 MILLIGRAM(S): at 06:00

## 2023-01-06 RX ADMIN — Medication 50 MILLIGRAM(S): at 05:58

## 2023-01-06 RX ADMIN — Medication 40 MILLIGRAM(S): at 05:59

## 2023-01-06 RX ADMIN — Medication 650 MILLIGRAM(S): at 21:42

## 2023-01-06 RX ADMIN — Medication 81 MILLIGRAM(S): at 11:53

## 2023-01-06 RX ADMIN — Medication 600 MILLIGRAM(S): at 05:58

## 2023-01-06 RX ADMIN — APIXABAN 2.5 MILLIGRAM(S): 2.5 TABLET, FILM COATED ORAL at 17:31

## 2023-01-06 RX ADMIN — Medication 5 MILLIGRAM(S): at 17:31

## 2023-01-06 RX ADMIN — BUDESONIDE AND FORMOTEROL FUMARATE DIHYDRATE 2 PUFF(S): 160; 4.5 AEROSOL RESPIRATORY (INHALATION) at 08:28

## 2023-01-06 RX ADMIN — Medication 40 MILLIGRAM(S): at 14:00

## 2023-01-06 RX ADMIN — APIXABAN 2.5 MILLIGRAM(S): 2.5 TABLET, FILM COATED ORAL at 05:59

## 2023-01-06 RX ADMIN — Medication 25 MILLIGRAM(S): at 14:00

## 2023-01-06 RX ADMIN — Medication 200 MILLIGRAM(S): at 05:58

## 2023-01-06 RX ADMIN — SENNA PLUS 2 TABLET(S): 8.6 TABLET ORAL at 21:40

## 2023-01-06 RX ADMIN — TIOTROPIUM BROMIDE 2 PUFF(S): 18 CAPSULE ORAL; RESPIRATORY (INHALATION) at 08:26

## 2023-01-06 RX ADMIN — Medication 650 MILLIGRAM(S): at 22:18

## 2023-01-06 NOTE — PROGRESS NOTE ADULT - SUBJECTIVE AND OBJECTIVE BOX
Patient is a 88y old  Male who presents with a chief complaint of respiratory failure,  Debility (2023 12:03)      HPI:  88 year old male with a PMH of CVA, CHF, HTN, afib on eliquis, hip fx s/p fall and prior intubation for HF exacerbation presenting to [facility] ED for SOB and CP admitted to ICU at [facility] on 22 for acute hypoxic respiratory failure, flash pulmonary edema and acute CHF exacerbation. Also found to be Flu A positive, resulting in multifocal PNA. Pt was on BIPAP, weaned down to NC 4L. Tolerated well. Troponins were mildly elevatd, but stablized, likely demand ischemia. Pt initially treated w/ tamiflu for influenza A, later developed multifocal pneunonia, neg for MRSA and legionella. ID recommended Cefepime, which patient finished last dose of on 1/3/23. Patient downgraded to medicine on 22. Seen by PM&R, PT, OT who recommended acute rehab. Patient admitted to [facility] for acute rehab on 1/3/23.     (2023 13:38)      PAST MEDICAL & SURGICAL HISTORY:  Afib      Congestive heart failure (CHF)      CVA (cerebral vascular accident)      Hypertension, unspecified type      No significant past surgical history          MEDICATIONS  (STANDING):  allopurinol 100 milliGRAM(s) Oral daily  apixaban 2.5 milliGRAM(s) Oral every 12 hours  aspirin enteric coated 81 milliGRAM(s) Oral daily  benzonatate 200 milliGRAM(s) Oral three times a day  budesonide 160 MICROgram(s)/formoterol 4.5 MICROgram(s) Inhaler 2 Puff(s) Inhalation two times a day  fluticasone propionate 50 MICROgram(s)/spray Nasal Spray 1 Spray(s) Both Nostrils two times a day  furosemide    Tablet 40 milliGRAM(s) Oral two times a day  guaiFENesin  milliGRAM(s) Oral every 12 hours  hydrALAZINE 25 milliGRAM(s) Oral every 8 hours  metoprolol succinate ER 50 milliGRAM(s) Oral daily  multivitamin 1 Tablet(s) Oral daily  pantoprazole    Tablet 40 milliGRAM(s) Oral before breakfast  senna 2 Tablet(s) Oral at bedtime  tiotropium 2.5 MICROgram(s) Inhaler 2 Puff(s) Inhalation daily    MEDICATIONS  (PRN):  acetaminophen     Tablet .. 650 milliGRAM(s) Oral every 6 hours PRN Temp greater or equal to 38C (100.4F), Mild Pain (1 - 3), Moderate Pain (4 - 6)  albuterol    90 MICROgram(s) HFA Inhaler 2 Puff(s) Inhalation every 6 hours PRN Shortness of Breath  bisacodyl 5 milliGRAM(s) Oral every 12 hours PRN Constipation  hemorrhoidal Ointment 1 Application(s) Rectal two times a day PRN rectal itching/ pain  methyl salicylate 15%/menthol 10% Topical Cream 1 Application(s) Topical two times a day PRN pain  sodium chloride 0.65% Nasal 1 Spray(s) Both Nostrils every 6 hours PRN Nasal Congestion      Allergies    No Known Allergies    Intolerances          VITALS  88y  Vital Signs Last 24 Hrs  T(C): 36.8 (2023 09:37), Max: 36.8 (2023 09:37)  T(F): 98.2 (2023 09:37), Max: 98.2 (2023 09:37)  HR: 75 (2023 09:37) (63 - 85)  BP: 148/69 (2023 09:37) (138/72 - 154/68)  BP(mean): --  RR: 17 (2023 09:37) (16 - 17)  SpO2: 95% (2023 09:37) (94% - 97%)    Parameters below as of 2023 09:37  Patient On (Oxygen Delivery Method): room air      Daily     Daily Weight in k (2023 07:14)        RECENT LABS:                          10.7   10.20 )-----------( 192      ( 2023 06:35 )             32.8     01-06    138  |  101  |  44<H>  ----------------------------<  99  3.8   |  30  |  1.51<H>    Ca    8.2<L>      2023 06:50    TPro  6.6  /  Alb  2.4<L>  /  TBili  0.6  /  DBili  x   /  AST  31  /  ALT  28  /  AlkPhos  54      LIVER FUNCTIONS - ( 2023 06:35 )  Alb: 2.4 g/dL / Pro: 6.6 g/dL / ALK PHOS: 54 U/L / ALT: 28 U/L / AST: 31 U/L / GGT: x                   CAPILLARY BLOOD GLUCOSE          Review of Systems:   · Additional ROS	Patient sitting in wheelchair after therapy. He notes that he has increased LE swelling and discomfort; lasix was reduced yesterday. No increased SOB or wheezing, no worsening cough although he still feels fatigue    Physical Exam:   · Constitutional Comments	alert +dysphonia, mild dyspnea with converstaion, no cough or respiratory distress  	  · Respiratory Comments	fair effort, no wheezing or rhonchi  no rales appreciated   · Cardiovascular	regular rate and rhythm; S1 S2 present; no gallops; no rub; no murmur  · Gastrointestinal	soft; nontender; nondistended; normal active bowel sounds  · Motor	left LE 2+ pitting edema  no warmth or erythema  +ACE wrap in place

## 2023-01-06 NOTE — PROGRESS NOTE ADULT - ASSESSMENT
88 year old male with a PMH of CVA, CHF, HTN, afib on eliquis, hip fx s/p fall and prior intubation for HF exacerbation presenting to [facility] ED for SOB and CP admitted to ICU for acute hypoxix respiratory failure, flash pulmonary edema and acute CHF exacerbation also found to be Flu A positive, s/p tamiflu, downgraded to medical floors. Treated for superimposed bacterial pneumonia w/ 7 days cefepime. Now at [facility] AR for debility from prolonged hospital stay.    # Debility  - PT/OT per PMR, pain control, and bowel regimen.    #Shortness of breath  #multifocal PNA  #influenza A  #Acute hypoxic respiratory failure -resolved.  - continue symbicort, nasal spray  - albuterol inhaler and tiotropium  - continue chest PT    - can use O2 intermittently if needed, goal to titrate off completely prior to AR discharge.    # Chronic Combined CHF    - echo 12/17 with EF 45-50%  - cont lasix 40mg daily for now given acute kidney injury   - Hold ARB due to renal function-  was on valsartan in March but has not used since then.   - continue hydralazine 25 mg q8h, metoprolol succ 50 mg daily  - cardiology OP follow up to optimize meds  - monitor volume status, daily weights, I/Os, replete lytes as needed  - cont medical management, cont asa, eliquis, bb    #CKD 3  - hold ARB due to CKD- was on valsartan in March but has not used since then  - avoid nephrotoxins, renally adjusted meds, monitor BMP  - appreciate Renal consult - d/w Dr. Pineda. Cont lasix for now- May need to accept a higher creatinine.      #Chronic Atrial Fibrillation  - continue metoprolol for rate control, eliquis 2.5mg bid. If renal function stable (Cr <1.5) will re-adjust dose to 5mg bid    #History of CVA  - continue ASA    # Constipation  - recommend bowel regimen.     #DVT PPx  - eliquis adjusted to 2.5 BID for renal fxn

## 2023-01-06 NOTE — PROGRESS NOTE ADULT - SUBJECTIVE AND OBJECTIVE BOX
Patient is a 88y old  Male who presents with a chief complaint of respiratory failure,  Debility (05 Jan 2023 20:58)    Patient seen and examined at bedside. No acute overnight events. Bothered by LE edema     ALLERGIES:  No Known Allergies    MEDICATIONS  (STANDING):  allopurinol 100 milliGRAM(s) Oral daily  apixaban 2.5 milliGRAM(s) Oral every 12 hours  aspirin enteric coated 81 milliGRAM(s) Oral daily  benzonatate 200 milliGRAM(s) Oral three times a day  budesonide 160 MICROgram(s)/formoterol 4.5 MICROgram(s) Inhaler 2 Puff(s) Inhalation two times a day  fluticasone propionate 50 MICROgram(s)/spray Nasal Spray 1 Spray(s) Both Nostrils two times a day  furosemide    Tablet 40 milliGRAM(s) Oral daily  guaiFENesin  milliGRAM(s) Oral every 12 hours  hydrALAZINE 25 milliGRAM(s) Oral every 8 hours  metoprolol succinate ER 50 milliGRAM(s) Oral daily  multivitamin 1 Tablet(s) Oral daily  pantoprazole    Tablet 40 milliGRAM(s) Oral before breakfast  senna 2 Tablet(s) Oral at bedtime  tiotropium 2.5 MICROgram(s) Inhaler 2 Puff(s) Inhalation daily    MEDICATIONS  (PRN):  acetaminophen     Tablet .. 650 milliGRAM(s) Oral every 6 hours PRN Temp greater or equal to 38C (100.4F), Mild Pain (1 - 3), Moderate Pain (4 - 6)  albuterol    90 MICROgram(s) HFA Inhaler 2 Puff(s) Inhalation every 6 hours PRN Shortness of Breath  bisacodyl 5 milliGRAM(s) Oral every 12 hours PRN Constipation  hemorrhoidal Ointment 1 Application(s) Rectal two times a day PRN rectal itching/ pain  methyl salicylate 15%/menthol 10% Topical Cream 1 Application(s) Topical two times a day PRN pain  sodium chloride 0.65% Nasal 1 Spray(s) Both Nostrils every 6 hours PRN Nasal Congestion    Vital Signs Last 24 Hrs  T(F): 98.2 (06 Jan 2023 09:37), Max: 98.2 (06 Jan 2023 09:37)  HR: 75 (06 Jan 2023 09:37) (63 - 85)  BP: 148/69 (06 Jan 2023 09:37) (138/72 - 154/68)  RR: 17 (06 Jan 2023 09:37) (16 - 17)  SpO2: 95% (06 Jan 2023 09:37) (94% - 97%)  I&O's Summary    BMI (kg/m2): 26.2 (01-03-23 @ 16:25)    PHYSICAL EXAM:  General: NAD, awake, alert. +dysarthria   ENT: MMM, no tonsilar exudate  Neck: Supple, No JVD  Lungs: Clear to auscultation bilaterally, no wheezes. Good air entry bilaterally   Cardio: RRR, S1/S2, No murmurs  Abdomen: Soft, Nontender, Nondistended; Bowel sounds present  Extremities: No calf tenderness, edema on b/l LE L>R    LABS:                        10.7   10.20 )-----------( 192      ( 05 Jan 2023 06:35 )             32.8       01-06    138  |  101  |  44  ----------------------------<  99  3.8   |  30  |  1.51    Ca    8.2      06 Jan 2023 06:50    TPro  6.6  /  Alb  2.4  /  TBili  0.6  /  DBili  x   /  AST  31  /  ALT  28  /  AlkPhos  54  01-05     COVID-19 PCR: NotDetec (01-02-23 @ 10:55)  COVID-19 PCR: NotDetec (01-02-23 @ 06:00)    RADIOLOGY & ADDITIONAL TESTS:     Care Discussed with Consultants/Other Providers:

## 2023-01-06 NOTE — PROGRESS NOTE ADULT - SUBJECTIVE AND OBJECTIVE BOX
No distress    Vital Signs Last 24 Hrs  T(C): 36.8 (01-06-23 @ 09:37), Max: 36.8 (01-06-23 @ 09:37)  T(F): 98.2 (01-06-23 @ 09:37), Max: 98.2 (01-06-23 @ 09:37)  HR: 76 (01-06-23 @ 14:01) (63 - 85)  BP: 136/60 (01-06-23 @ 14:01) (136/60 - 154/68)  RR: 17 (01-06-23 @ 09:37) (16 - 17)  SpO2: 95% (01-06-23 @ 09:37) (94% - 97%)    s1s2  b/l air entry  soft, ND  + edema, stasis changes                                                                                    10.7   10.20 )-----------( 192      ( 05 Jan 2023 06:35 )             32.8     06 Jan 2023 06:50    138    |  101    |  44     ----------------------------<  99     3.8     |  30     |  1.51     Ca    8.2        06 Jan 2023 06:50    TPro  6.6    /  Alb  2.4    /  TBili  0.6    /  DBili  x      /  AST  31     /  ALT  28     /  AlkPhos  54     05 Jan 2023 06:35    LIVER FUNCTIONS - ( 05 Jan 2023 06:35 )  Alb: 2.4 g/dL / Pro: 6.6 g/dL / ALK PHOS: 54 U/L / ALT: 28 U/L / AST: 31 U/L / GGT: x           acetaminophen     Tablet .. 650 milliGRAM(s) Oral every 6 hours PRN  albuterol    90 MICROgram(s) HFA Inhaler 2 Puff(s) Inhalation every 6 hours PRN  allopurinol 100 milliGRAM(s) Oral daily  apixaban 2.5 milliGRAM(s) Oral every 12 hours  aspirin enteric coated 81 milliGRAM(s) Oral daily  benzonatate 200 milliGRAM(s) Oral three times a day  bisacodyl 5 milliGRAM(s) Oral every 12 hours PRN  budesonide 160 MICROgram(s)/formoterol 4.5 MICROgram(s) Inhaler 2 Puff(s) Inhalation two times a day  fluticasone propionate 50 MICROgram(s)/spray Nasal Spray 1 Spray(s) Both Nostrils two times a day  furosemide    Tablet 40 milliGRAM(s) Oral two times a day  guaiFENesin  milliGRAM(s) Oral every 12 hours  hemorrhoidal Ointment 1 Application(s) Rectal two times a day PRN  hydrALAZINE 25 milliGRAM(s) Oral every 8 hours  methyl salicylate 15%/menthol 10% Topical Cream 1 Application(s) Topical two times a day PRN  metoprolol succinate ER 50 milliGRAM(s) Oral daily  multivitamin 1 Tablet(s) Oral daily  pantoprazole    Tablet 40 milliGRAM(s) Oral before breakfast  senna 2 Tablet(s) Oral at bedtime  sodium chloride 0.65% Nasal 1 Spray(s) Both Nostrils every 6 hours PRN  tiotropium 2.5 MICROgram(s) Inhaler 2 Puff(s) Inhalation daily    A/P:    S/p Flu A, b/l PNA  CM, mod MR, TR, EF 45-50%  CKD 3 fairly stable  UA bland  Renal SONO w/o hydro  Lasix 40mg PO BID  Avoid nephrotoxins  F/u [facility]     [phone]

## 2023-01-06 NOTE — PROGRESS NOTE ADULT - ASSESSMENT
88 year old male with a PMH of CVA, CHF, HTN, afib on eliquis, hip fx s/p fall presented to [facility] on 12/17/22 with acute hypoxic respiratory failure secondary to flash pulmonary edema and acute CHF exacerbation. Also found to be Flu A positive, resulting in multifocal PNA. s/p tamiflu and cefepime.     # Debility secondary to acute hypoxic respiratory failure  - multifocal PNA, influenza A  - CXR 12/31 +infiltrates, improved, afebrile, tolerating therapy  - s/p steroid taper. Cefepime completed  - c/w symbicort, nasal spray, albuterol inhaler and tiotropium  - continue chest PT PRN  - Continue comprehensive Rehab Program of PT/OT - 3 hours a day, 5 days a week  - Precautions: cardiac, fall, respiratory,. AC    # Pulmonary Edema 2/2 acute on chronic CHF  - hold ARB due to poor renal function  - on lasix, reduced to 40mg daily per renal recs 1/5  - c/w hydralazine and metoprolol  - monitor daily I/Os and weight    # IRINEO on CKD3  - hold ARB, can consider restarting on discharge from rehab as IRINEO resolves  - avoid nephrotoxins,  renally adjusted meds  - renal following: Cont lasix for now as daily.  - BUN/Cr 44/1.51 1/6 improved  - daily BMP x 3    # chronic Afib  - c/w eliquis 2.5mg bid  - Hr 63-85 1/6    # Sleep  - melatonin PRN     #  stage 2 right buttock pressure ulcer.  - Cavilon to periwound, and alyvene foam dressing BID or PRN if soiled/removed    # Pain Mgmt   - Tylenol PRN    # GI/Bowel Mgmt   - Continent c/w Senna  - Preparation H for hemorrhoids    # FEN   - Diet - minced and moist + Thins  [CCHO, DASH/TLC]      # DVT PPX:  - eliquis renally dosed  - Increased LE swelling and discomfort 1/6. continue ACE wrapping, elevation. May be due to reduction lasix, will order doppler r/o DVT  - discuss with renal increasing lasix if doppler negative    # LABs  doppler BLE  BMp daily        88 year old male with a PMH of CVA, CHF, HTN, afib on eliquis, hip fx s/p fall presented to [facility] on 12/17/22 with acute hypoxic respiratory failure secondary to flash pulmonary edema and acute CHF exacerbation. Also found to be Flu A positive, resulting in multifocal PNA. s/p tamiflu and cefepime.     # Debility secondary to acute hypoxic respiratory failure  - multifocal PNA, influenza A  - CXR 12/31 +infiltrates, improved, afebrile, tolerating therapy  - s/p steroid taper. Cefepime completed  - c/w symbicort, nasal spray, albuterol inhaler and tiotropium  - continue chest PT PRN  - Continue comprehensive Rehab Program of PT/OT - 3 hours a day, 5 days a week  - Precautions: cardiac, fall, respiratory,. AC    # Pulmonary Edema 2/2 acute on chronic CHF  - hold ARB due to poor renal function  - on lasix, reduced to 40mg daily per renal recs 1/5  - c/w hydralazine and metoprolol  - monitor daily I/Os and weight    # IRINEO on CKD3  - hold ARB, can consider restarting on discharge from rehab as IRINEO resolves  - avoid nephrotoxins,  renally adjusted meds  - renal following: Cont lasix for now as daily.  - BUN/Cr 44/1.51 1/6 improved  - daily BMP x 3    # chronic Afib  - c/w eliquis 2.5mg bid  - Hr 63-85 1/6    # Sleep  - melatonin PRN     #  stage 2 right buttock pressure ulcer.  - Cavilon to periwound, and alyvene foam dressing BID or PRN if soiled/removed    # Pain Mgmt   - Tylenol PRN    # GI/Bowel Mgmt   - Continent c/w Senna  - Preparation H for hemorrhoids    # FEN   - Diet - minced and moist + Thins  [CCHO, DASH/TLC]      # DVT PPX:  - doppler 12/25 negative DVT  - eliquis renally dosed  - Increased LE swelling and discomfort 1/6. continue ACE wrapping, elevation. May be due to reduction lasix, will order doppler r/o DVT  - discuss with renal increasing lasix if doppler negative    # LABs  doppler BLE  BMp daily

## 2023-01-07 LAB
ANION GAP SERPL CALC-SCNC: 5 MMOL/L — SIGNIFICANT CHANGE UP (ref 5–17)
BUN SERPL-MCNC: 43 MG/DL — HIGH (ref 7–23)
CALCIUM SERPL-MCNC: 8.1 MG/DL — LOW (ref 8.4–10.5)
CHLORIDE SERPL-SCNC: 98 MMOL/L — SIGNIFICANT CHANGE UP (ref 96–108)
CO2 SERPL-SCNC: 32 MMOL/L — HIGH (ref 22–31)
CREAT SERPL-MCNC: 1.6 MG/DL — HIGH (ref 0.5–1.3)
EGFR: 41 ML/MIN/1.73M2 — LOW
GLUCOSE SERPL-MCNC: 98 MG/DL — SIGNIFICANT CHANGE UP (ref 70–99)
POTASSIUM SERPL-MCNC: 3.6 MMOL/L — SIGNIFICANT CHANGE UP (ref 3.5–5.3)
POTASSIUM SERPL-SCNC: 3.6 MMOL/L — SIGNIFICANT CHANGE UP (ref 3.5–5.3)
SODIUM SERPL-SCNC: 135 MMOL/L — SIGNIFICANT CHANGE UP (ref 135–145)

## 2023-01-07 PROCEDURE — 99232 SBSQ HOSP IP/OBS MODERATE 35: CPT

## 2023-01-07 RX ADMIN — Medication 100 MILLIGRAM(S): at 12:16

## 2023-01-07 RX ADMIN — PANTOPRAZOLE SODIUM 40 MILLIGRAM(S): 20 TABLET, DELAYED RELEASE ORAL at 05:44

## 2023-01-07 RX ADMIN — Medication 40 MILLIGRAM(S): at 05:45

## 2023-01-07 RX ADMIN — Medication 81 MILLIGRAM(S): at 12:16

## 2023-01-07 RX ADMIN — Medication 25 MILLIGRAM(S): at 21:58

## 2023-01-07 RX ADMIN — Medication 200 MILLIGRAM(S): at 21:59

## 2023-01-07 RX ADMIN — Medication 25 MILLIGRAM(S): at 14:01

## 2023-01-07 RX ADMIN — APIXABAN 2.5 MILLIGRAM(S): 2.5 TABLET, FILM COATED ORAL at 05:44

## 2023-01-07 RX ADMIN — Medication 25 MILLIGRAM(S): at 05:45

## 2023-01-07 RX ADMIN — MENTHOL AND METHYL SALICYLATE 1 APPLICATION(S): 10; 30 CREAM TOPICAL at 22:00

## 2023-01-07 RX ADMIN — APIXABAN 2.5 MILLIGRAM(S): 2.5 TABLET, FILM COATED ORAL at 17:27

## 2023-01-07 RX ADMIN — Medication 1 TABLET(S): at 12:16

## 2023-01-07 RX ADMIN — Medication 40 MILLIGRAM(S): at 14:01

## 2023-01-07 RX ADMIN — Medication 600 MILLIGRAM(S): at 05:44

## 2023-01-07 RX ADMIN — Medication 50 MILLIGRAM(S): at 05:44

## 2023-01-07 RX ADMIN — Medication 200 MILLIGRAM(S): at 05:43

## 2023-01-07 NOTE — PROGRESS NOTE ADULT - SUBJECTIVE AND OBJECTIVE BOX
Patient is a 88y old  Male who presents with a chief complaint of respiratory failure,  Debility (06 Jan 2023 21:18)    Patient seen and examined at bedside. No acute overnight events.     ALLERGIES:  No Known Allergies    MEDICATIONS  (STANDING):  allopurinol 100 milliGRAM(s) Oral daily  apixaban 2.5 milliGRAM(s) Oral every 12 hours  aspirin enteric coated 81 milliGRAM(s) Oral daily  benzonatate 200 milliGRAM(s) Oral three times a day  budesonide 160 MICROgram(s)/formoterol 4.5 MICROgram(s) Inhaler 2 Puff(s) Inhalation two times a day  fluticasone propionate 50 MICROgram(s)/spray Nasal Spray 1 Spray(s) Both Nostrils two times a day  furosemide    Tablet 40 milliGRAM(s) Oral two times a day  guaiFENesin  milliGRAM(s) Oral every 12 hours  hydrALAZINE 25 milliGRAM(s) Oral every 8 hours  metoprolol succinate ER 50 milliGRAM(s) Oral daily  multivitamin 1 Tablet(s) Oral daily  pantoprazole    Tablet 40 milliGRAM(s) Oral before breakfast  senna 2 Tablet(s) Oral at bedtime  tiotropium 2.5 MICROgram(s) Inhaler 2 Puff(s) Inhalation daily    MEDICATIONS  (PRN):  acetaminophen     Tablet .. 650 milliGRAM(s) Oral every 6 hours PRN Temp greater or equal to 38C (100.4F), Mild Pain (1 - 3), Moderate Pain (4 - 6)  albuterol    90 MICROgram(s) HFA Inhaler 2 Puff(s) Inhalation every 6 hours PRN Shortness of Breath  bisacodyl 5 milliGRAM(s) Oral every 12 hours PRN Constipation  hemorrhoidal Ointment 1 Application(s) Rectal two times a day PRN rectal itching/ pain  methyl salicylate 15%/menthol 10% Topical Cream 1 Application(s) Topical two times a day PRN pain  sodium chloride 0.65% Nasal 1 Spray(s) Both Nostrils every 6 hours PRN Nasal Congestion    Vital Signs Last 24 Hrs  T(F): 98 (07 Jan 2023 09:21), Max: 98 (07 Jan 2023 09:21)  HR: 71 (07 Jan 2023 09:21) (66 - 76)  BP: 136/64 (07 Jan 2023 09:21) (136/60 - 162/64)  RR: 17 (07 Jan 2023 09:21) (17 - 17)  SpO2: 96% (07 Jan 2023 09:21) (96% - 96%)  I&O's Summary    BMI (kg/m2): 26.2 (01-03-23 @ 16:25)    PHYSICAL EXAM:  General: NAD, awake, alert. +dysarthria   ENT: MMM, no tonsilar exudate  Neck: Supple, No JVD  Lungs: Clear to auscultation bilaterally, no wheezes. Good air entry bilaterally   Cardio: RRR, S1/S2, No murmurs  Abdomen: Soft, Nontender, Nondistended; Bowel sounds present  Extremities: No calf tenderness, edema on b/l LE L>R    LABS:                        10.7   10.20 )-----------( 192      ( 05 Jan 2023 06:35 )             32.8       01-07    135  |  98  |  43  ----------------------------<  98  3.6   |  32  |  1.60    Ca    8.1      07 Jan 2023 06:45    TPro  6.6  /  Alb  2.4  /  TBili  0.6  /  DBili  x   /  AST  31  /  ALT  28  /  AlkPhos  54  01-05     COVID-19 PCR: NotDetec (01-02-23 @ 10:55)  COVID-19 PCR: NotDetec (01-02-23 @ 06:00)    RADIOLOGY & ADDITIONAL TESTS:     Care Discussed with Consultants/Other Providers:

## 2023-01-07 NOTE — PROGRESS NOTE ADULT - ASSESSMENT
88 year old male with a PMH of CVA, CHF, HTN, afib on eliquis, hip fx s/p fall and prior intubation for HF exacerbation presenting to [facility] ED for SOB and CP admitted to ICU for acute hypoxix respiratory failure, flash pulmonary edema and acute CHF exacerbation also found to be Flu A positive, s/p tamiflu, downgraded to medical floors. Treated for superimposed bacterial pneumonia w/ 7 days cefepime. Now at [facility] AR for debility from prolonged hospital stay.    # Debility  - PT/OT per PMR, pain control, and bowel regimen.    #Shortness of breath  #multifocal PNA  #influenza A  #Acute hypoxic respiratory failure -resolved.  - continue symbicort, nasal spray  - albuterol inhaler and tiotropium  - continue chest PT    - can use O2 intermittently if needed, goal to titrate off completely prior to AR discharge.    # Chronic Combined CHF    - echo 12/17 with EF 45-50%  - cont lasix 40mg bid  - Hold ARB due to renal function-  was on valsartan in March but has not used since then.   - continue hydralazine 25 mg q8h, metoprolol succ 50 mg daily  - cardiology OP follow up to optimize meds  - monitor volume status, daily weights, I/Os, replete lytes as needed  - cont medical management, cont asa, eliquis, bb    #CKD 3  - hold ARB due to CKD- was on valsartan in March but has not used since then  - avoid nephrotoxins, renally adjusted meds, monitor BMP  - appreciate Renal consult - d/w Dr. Pineda. Cont lasix for now- May need to accept a higher creatinine.      #Chronic Atrial Fibrillation  - continue metoprolol for rate control, eliquis 2.5mg bid. If renal function stable (Cr <1.5) will re-adjust dose to 5mg bid    #History of CVA  - continue ASA    # Constipation  - recommend bowel regimen.     #DVT PPx  - eliquis adjusted to 2.5 BID for renal fxn

## 2023-01-08 LAB
ANION GAP SERPL CALC-SCNC: 7 MMOL/L — SIGNIFICANT CHANGE UP (ref 5–17)
BUN SERPL-MCNC: 43 MG/DL — HIGH (ref 7–23)
CALCIUM SERPL-MCNC: 8.1 MG/DL — LOW (ref 8.4–10.5)
CHLORIDE SERPL-SCNC: 101 MMOL/L — SIGNIFICANT CHANGE UP (ref 96–108)
CO2 SERPL-SCNC: 30 MMOL/L — SIGNIFICANT CHANGE UP (ref 22–31)
CREAT SERPL-MCNC: 1.62 MG/DL — HIGH (ref 0.5–1.3)
EGFR: 41 ML/MIN/1.73M2 — LOW
GLUCOSE SERPL-MCNC: 91 MG/DL — SIGNIFICANT CHANGE UP (ref 70–99)
POTASSIUM SERPL-MCNC: 3.7 MMOL/L — SIGNIFICANT CHANGE UP (ref 3.5–5.3)
POTASSIUM SERPL-SCNC: 3.7 MMOL/L — SIGNIFICANT CHANGE UP (ref 3.5–5.3)
SODIUM SERPL-SCNC: 138 MMOL/L — SIGNIFICANT CHANGE UP (ref 135–145)

## 2023-01-08 PROCEDURE — 99232 SBSQ HOSP IP/OBS MODERATE 35: CPT

## 2023-01-08 RX ORDER — FUROSEMIDE 40 MG
40 TABLET ORAL DAILY
Refills: 0 | Status: DISCONTINUED | OUTPATIENT
Start: 2023-01-09 | End: 2023-01-09

## 2023-01-08 RX ADMIN — Medication 25 MILLIGRAM(S): at 13:21

## 2023-01-08 RX ADMIN — Medication 25 MILLIGRAM(S): at 21:49

## 2023-01-08 RX ADMIN — Medication 25 MILLIGRAM(S): at 05:48

## 2023-01-08 RX ADMIN — APIXABAN 2.5 MILLIGRAM(S): 2.5 TABLET, FILM COATED ORAL at 05:49

## 2023-01-08 RX ADMIN — Medication 81 MILLIGRAM(S): at 11:56

## 2023-01-08 RX ADMIN — Medication 40 MILLIGRAM(S): at 05:49

## 2023-01-08 RX ADMIN — Medication 1 TABLET(S): at 11:58

## 2023-01-08 RX ADMIN — Medication 100 MILLIGRAM(S): at 11:57

## 2023-01-08 RX ADMIN — SENNA PLUS 2 TABLET(S): 8.6 TABLET ORAL at 21:49

## 2023-01-08 RX ADMIN — PANTOPRAZOLE SODIUM 40 MILLIGRAM(S): 20 TABLET, DELAYED RELEASE ORAL at 05:50

## 2023-01-08 RX ADMIN — Medication 50 MILLIGRAM(S): at 05:49

## 2023-01-08 RX ADMIN — APIXABAN 2.5 MILLIGRAM(S): 2.5 TABLET, FILM COATED ORAL at 17:18

## 2023-01-08 NOTE — PROGRESS NOTE ADULT - SUBJECTIVE AND OBJECTIVE BOX
Patient is a 88y old  Male who presents with a chief complaint of respiratory failure,  Debility (07 Jan 2023 11:30)    Patient seen and examined at bedside. No acute overnight events. Having breakfast, denies pain.    ALLERGIES:  No Known Allergies    MEDICATIONS  (STANDING):  allopurinol 100 milliGRAM(s) Oral daily  apixaban 2.5 milliGRAM(s) Oral every 12 hours  aspirin enteric coated 81 milliGRAM(s) Oral daily  benzonatate 200 milliGRAM(s) Oral three times a day  budesonide 160 MICROgram(s)/formoterol 4.5 MICROgram(s) Inhaler 2 Puff(s) Inhalation two times a day  fluticasone propionate 50 MICROgram(s)/spray Nasal Spray 1 Spray(s) Both Nostrils two times a day  guaiFENesin  milliGRAM(s) Oral every 12 hours  hydrALAZINE 25 milliGRAM(s) Oral every 8 hours  metoprolol succinate ER 50 milliGRAM(s) Oral daily  multivitamin 1 Tablet(s) Oral daily  pantoprazole    Tablet 40 milliGRAM(s) Oral before breakfast  senna 2 Tablet(s) Oral at bedtime  tiotropium 2.5 MICROgram(s) Inhaler 2 Puff(s) Inhalation daily    MEDICATIONS  (PRN):  acetaminophen     Tablet .. 650 milliGRAM(s) Oral every 6 hours PRN Temp greater or equal to 38C (100.4F), Mild Pain (1 - 3), Moderate Pain (4 - 6)  albuterol    90 MICROgram(s) HFA Inhaler 2 Puff(s) Inhalation every 6 hours PRN Shortness of Breath  bisacodyl 5 milliGRAM(s) Oral every 12 hours PRN Constipation  hemorrhoidal Ointment 1 Application(s) Rectal two times a day PRN rectal itching/ pain  methyl salicylate 15%/menthol 10% Topical Cream 1 Application(s) Topical two times a day PRN pain  sodium chloride 0.65% Nasal 1 Spray(s) Both Nostrils every 6 hours PRN Nasal Congestion    Vital Signs Last 24 Hrs  T(F): 97.5 (08 Jan 2023 09:06), Max: 97.5 (08 Jan 2023 09:06)  HR: 69 (08 Jan 2023 09:06) (53 - 78)  BP: 148/73 (08 Jan 2023 09:06) (125/52 - 149/78)  RR: 17 (08 Jan 2023 09:06) (16 - 17)  SpO2: 95% (08 Jan 2023 09:06) (95% - 96%)  I&O's Summary    BMI (kg/m2): 26.2 (01-03-23 @ 16:25)    PHYSICAL EXAM:  General: NAD, awake. +dysarthria   ENT: MMM, no tonsilar exudate  Neck: Supple, No JVD  Lungs: Clear to auscultation bilaterally, no wheezes. Good air entry bilaterally   Cardio: IRR, normal rate   Abdomen: Soft, Nontender, Nondistended; Bowel sounds present  Extremities: No calf tenderness, b/l LE edema L>R    LABS:      01-08    138  |  101  |  43  ----------------------------<  91  3.7   |  30  |  1.62    Ca    8.1      08 Jan 2023 06:25    COVID-19 PCR: NotDetec (01-02-23 @ 10:55)  COVID-19 PCR: NotDetec (01-02-23 @ 06:00)    RADIOLOGY & ADDITIONAL TESTS:     Care Discussed with Consultants/Other Providers:

## 2023-01-08 NOTE — PROGRESS NOTE ADULT - ASSESSMENT
88 year old male with a PMH of CVA, CHF, HTN, afib on eliquis, hip fx s/p fall and prior intubation for HF exacerbation presenting to [facility] ED for SOB and CP admitted to ICU for acute hypoxix respiratory failure, flash pulmonary edema and acute CHF exacerbation also found to be Flu A positive, s/p tamiflu, downgraded to medical floors. Treated for superimposed bacterial pneumonia w/ 7 days cefepime. Now at [facility] AR for debility from prolonged hospital stay.    # Debility  - PT/OT per PMR, pain control, and bowel regimen.    #Shortness of breath  #multifocal PNA  #influenza A  #Acute hypoxic respiratory failure -resolved.  - continue symbicort, nasal spray  - albuterol inhaler and tiotropium  - continue chest PT    - can use O2 intermittently if needed, goal to titrate off completely prior to AR discharge.    # Chronic Combined CHF    - echo 12/17 with EF 45-50%  - lasix 40mg daily given uptrending Cr  - Hold ARB due to renal function-  was on valsartan in March but has not used since then.   - continue hydralazine 25 mg q8h, metoprolol succ 50 mg daily  - cardiology OP follow up to optimize meds  - monitor volume status, daily weights, I/Os, replete lytes as needed  - cont medical management, cont asa, eliquis, bb    #CKD 3  - hold ARB due to CKD- was on valsartan in March but has not used since then  - avoid nephrotoxins, renally adjusted meds, monitor BMP  - appreciate Renal consult - d/w Dr. Pineda. Cont lasix for now- May need to accept a higher creatinine.      #Chronic Atrial Fibrillation  - continue metoprolol for rate control, eliquis 2.5mg bid. If renal function stable (Cr <1.5) will re-adjust dose to 5mg bid    #History of CVA  - continue ASA    # Constipation  - recommend bowel regimen.     #DVT PPx  - eliquis adjusted to 2.5 BID for renal fxn

## 2023-01-09 ENCOUNTER — TRANSCRIPTION ENCOUNTER (OUTPATIENT)
Age: 88
End: 2023-01-09

## 2023-01-09 LAB
ALBUMIN SERPL ELPH-MCNC: 2.3 G/DL — LOW (ref 3.3–5)
ALP SERPL-CCNC: 50 U/L — SIGNIFICANT CHANGE UP (ref 40–120)
ALT FLD-CCNC: 20 U/L — SIGNIFICANT CHANGE UP (ref 10–45)
ANION GAP SERPL CALC-SCNC: 8 MMOL/L — SIGNIFICANT CHANGE UP (ref 5–17)
AST SERPL-CCNC: 21 U/L — SIGNIFICANT CHANGE UP (ref 10–40)
BASOPHILS # BLD AUTO: 0.03 K/UL — SIGNIFICANT CHANGE UP (ref 0–0.2)
BASOPHILS NFR BLD AUTO: 0.4 % — SIGNIFICANT CHANGE UP (ref 0–2)
BILIRUB SERPL-MCNC: 0.5 MG/DL — SIGNIFICANT CHANGE UP (ref 0.2–1.2)
BUN SERPL-MCNC: 39 MG/DL — HIGH (ref 7–23)
CALCIUM SERPL-MCNC: 8.1 MG/DL — LOW (ref 8.4–10.5)
CHLORIDE SERPL-SCNC: 98 MMOL/L — SIGNIFICANT CHANGE UP (ref 96–108)
CO2 SERPL-SCNC: 30 MMOL/L — SIGNIFICANT CHANGE UP (ref 22–31)
CREAT SERPL-MCNC: 1.52 MG/DL — HIGH (ref 0.5–1.3)
EGFR: 44 ML/MIN/1.73M2 — LOW
EOSINOPHIL # BLD AUTO: 0.07 K/UL — SIGNIFICANT CHANGE UP (ref 0–0.5)
EOSINOPHIL NFR BLD AUTO: 0.8 % — SIGNIFICANT CHANGE UP (ref 0–6)
GLUCOSE SERPL-MCNC: 94 MG/DL — SIGNIFICANT CHANGE UP (ref 70–99)
HCT VFR BLD CALC: 29.2 % — LOW (ref 39–50)
HGB BLD-MCNC: 9.7 G/DL — LOW (ref 13–17)
IMM GRANULOCYTES NFR BLD AUTO: 1.2 % — HIGH (ref 0–0.9)
LYMPHOCYTES # BLD AUTO: 1.17 K/UL — SIGNIFICANT CHANGE UP (ref 1–3.3)
LYMPHOCYTES # BLD AUTO: 14.1 % — SIGNIFICANT CHANGE UP (ref 13–44)
MCHC RBC-ENTMCNC: 32.6 PG — SIGNIFICANT CHANGE UP (ref 27–34)
MCHC RBC-ENTMCNC: 33.2 GM/DL — SIGNIFICANT CHANGE UP (ref 32–36)
MCV RBC AUTO: 98 FL — SIGNIFICANT CHANGE UP (ref 80–100)
MONOCYTES # BLD AUTO: 1.27 K/UL — HIGH (ref 0–0.9)
MONOCYTES NFR BLD AUTO: 15.3 % — HIGH (ref 2–14)
NEUTROPHILS # BLD AUTO: 5.68 K/UL — SIGNIFICANT CHANGE UP (ref 1.8–7.4)
NEUTROPHILS NFR BLD AUTO: 68.2 % — SIGNIFICANT CHANGE UP (ref 43–77)
NRBC # BLD: 0 /100 WBCS — SIGNIFICANT CHANGE UP (ref 0–0)
OB PNL STL: NEGATIVE — SIGNIFICANT CHANGE UP
PLATELET # BLD AUTO: 176 K/UL — SIGNIFICANT CHANGE UP (ref 150–400)
POTASSIUM SERPL-MCNC: 3.6 MMOL/L — SIGNIFICANT CHANGE UP (ref 3.5–5.3)
POTASSIUM SERPL-SCNC: 3.6 MMOL/L — SIGNIFICANT CHANGE UP (ref 3.5–5.3)
PROT SERPL-MCNC: 6.6 G/DL — SIGNIFICANT CHANGE UP (ref 6–8.3)
RBC # BLD: 2.98 M/UL — LOW (ref 4.2–5.8)
RBC # FLD: 14.3 % — SIGNIFICANT CHANGE UP (ref 10.3–14.5)
SODIUM SERPL-SCNC: 136 MMOL/L — SIGNIFICANT CHANGE UP (ref 135–145)
WBC # BLD: 8.32 K/UL — SIGNIFICANT CHANGE UP (ref 3.8–10.5)
WBC # FLD AUTO: 8.32 K/UL — SIGNIFICANT CHANGE UP (ref 3.8–10.5)

## 2023-01-09 PROCEDURE — 99232 SBSQ HOSP IP/OBS MODERATE 35: CPT

## 2023-01-09 PROCEDURE — 99232 SBSQ HOSP IP/OBS MODERATE 35: CPT | Mod: GC

## 2023-01-09 RX ORDER — FUROSEMIDE 40 MG
40 TABLET ORAL
Refills: 0 | Status: DISCONTINUED | OUTPATIENT
Start: 2023-01-10 | End: 2023-01-12

## 2023-01-09 RX ADMIN — PANTOPRAZOLE SODIUM 40 MILLIGRAM(S): 20 TABLET, DELAYED RELEASE ORAL at 05:46

## 2023-01-09 RX ADMIN — Medication 25 MILLIGRAM(S): at 05:50

## 2023-01-09 RX ADMIN — Medication 81 MILLIGRAM(S): at 12:00

## 2023-01-09 RX ADMIN — BUDESONIDE AND FORMOTEROL FUMARATE DIHYDRATE 2 PUFF(S): 160; 4.5 AEROSOL RESPIRATORY (INHALATION) at 08:11

## 2023-01-09 RX ADMIN — Medication 50 MILLIGRAM(S): at 05:45

## 2023-01-09 RX ADMIN — APIXABAN 2.5 MILLIGRAM(S): 2.5 TABLET, FILM COATED ORAL at 17:26

## 2023-01-09 RX ADMIN — TIOTROPIUM BROMIDE 2 PUFF(S): 18 CAPSULE ORAL; RESPIRATORY (INHALATION) at 08:10

## 2023-01-09 RX ADMIN — APIXABAN 2.5 MILLIGRAM(S): 2.5 TABLET, FILM COATED ORAL at 05:46

## 2023-01-09 RX ADMIN — Medication 25 MILLIGRAM(S): at 13:57

## 2023-01-09 RX ADMIN — Medication 40 MILLIGRAM(S): at 05:46

## 2023-01-09 RX ADMIN — Medication 1 TABLET(S): at 11:59

## 2023-01-09 RX ADMIN — SENNA PLUS 2 TABLET(S): 8.6 TABLET ORAL at 21:11

## 2023-01-09 RX ADMIN — Medication 25 MILLIGRAM(S): at 21:11

## 2023-01-09 RX ADMIN — Medication 100 MILLIGRAM(S): at 11:59

## 2023-01-09 NOTE — PROGRESS NOTE ADULT - SUBJECTIVE AND OBJECTIVE BOX
No distress, comfortable on RA    Vital Signs Last 24 Hrs  T(C): 36.6 (01-09-23 @ 08:23), Max: 36.6 (01-09-23 @ 08:23)  T(F): 97.8 (01-09-23 @ 08:23), Max: 97.8 (01-09-23 @ 08:23)  HR: 68 (01-09-23 @ 13:54) (67 - 72)  BP: 131/65 (01-09-23 @ 13:54) (131/65 - 149/66)  RR: 17 (01-09-23 @ 08:23) (17 - 17)  SpO2: 98% (01-09-23 @ 09:30) (95% - 98%)    s1s2  b/l air entry  soft, ND  + edema, stasis changes                                                                                            9.7    8.32  )-----------( 176      ( 09 Jan 2023 06:12 )             29.2     09 Jan 2023 06:12    136    |  98     |  39     ----------------------------<  94     3.6     |  30     |  1.52     Ca    8.1        09 Jan 2023 06:12    TPro  6.6    /  Alb  2.3    /  TBili  0.5    /  DBili  x      /  AST  21     /  ALT  20     /  AlkPhos  50     09 Jan 2023 06:12    LIVER FUNCTIONS - ( 09 Jan 2023 06:12 )  Alb: 2.3 g/dL / Pro: 6.6 g/dL / ALK PHOS: 50 U/L / ALT: 20 U/L / AST: 21 U/L / GGT: x           acetaminophen     Tablet .. 650 milliGRAM(s) Oral every 6 hours PRN  albuterol    90 MICROgram(s) HFA Inhaler 2 Puff(s) Inhalation every 6 hours PRN  allopurinol 100 milliGRAM(s) Oral daily  apixaban 2.5 milliGRAM(s) Oral every 12 hours  aspirin enteric coated 81 milliGRAM(s) Oral daily  benzonatate 200 milliGRAM(s) Oral three times a day  bisacodyl 5 milliGRAM(s) Oral every 12 hours PRN  budesonide 160 MICROgram(s)/formoterol 4.5 MICROgram(s) Inhaler 2 Puff(s) Inhalation two times a day  hemorrhoidal Ointment 1 Application(s) Rectal two times a day PRN  hydrALAZINE 25 milliGRAM(s) Oral every 8 hours  methyl salicylate 15%/menthol 10% Topical Cream 1 Application(s) Topical two times a day PRN  metoprolol succinate ER 50 milliGRAM(s) Oral daily  multivitamin 1 Tablet(s) Oral daily  pantoprazole    Tablet 40 milliGRAM(s) Oral before breakfast  senna 2 Tablet(s) Oral at bedtime  sodium chloride 0.65% Nasal 1 Spray(s) Both Nostrils every 6 hours PRN  tiotropium 2.5 MICROgram(s) Inhaler 2 Puff(s) Inhalation daily    A/P:    S/p Flu A, b/l PNA  CM, mod MR, TR, EF 45-50%  CKD 3 fairly stable  UA bland  Renal SONO w/o hydro  No objection to Lasix 40mg PO BID  Avoid nephrotoxins  F/u [facility]     [phone]

## 2023-01-09 NOTE — PROGRESS NOTE ADULT - ASSESSMENT
88 year old male with a PMH of CVA, CHF, HTN, afib on eliquis, hip fx s/p fall and prior intubation for HF exacerbation presenting to [facility] ED for SOB and CP admitted to ICU for acute hypoxix respiratory failure, flash pulmonary edema and acute CHF exacerbation also found to be Flu A positive, s/p tamiflu, downgraded to medical floors. Treated for superimposed bacterial pneumonia w/ 7 days cefepime. Now at [facility] AR for debility from prolonged hospital stay.    # Debility  - PT/OT per PMR, pain control, and bowel regimen.    #Shortness of breath  #multifocal PNA  #influenza A  #Acute hypoxic respiratory failure -resolved.  - continue symbicort, nasal spray  - albuterol inhaler and tiotropium  - continue chest PT    - can use O2 intermittently if needed, goal to titrate off completely prior to AR discharge.    # Chronic Combined CHF    - echo 12/17 with EF 45-50%  - lasix 40mg daily given uptrending Cr  - Hold ARB due to renal function-  was on valsartan in March but has not used since then.   - continue hydralazine 25 mg q8h, metoprolol succ 50 mg daily  - cardiology OP follow up to optimize meds  - monitor volume status, daily weights, I/Os, replete lytes as needed  - cont medical management, cont asa, eliquis, bb    #CKD 3  - hold ARB due to CKD- was on valsartan in March but has not used since then  - avoid nephrotoxins, renally adjusted meds, monitor BMP  - appreciate Renal consult - d/w Dr. Pineda. Cont lasix for now- May need to accept a higher creatinine.      #Chronic Atrial Fibrillation  - continue metoprolol for rate control, eliquis 2.5mg bid. If renal function stable (Cr <1.5) will re-adjust dose to 5mg bid    #History of CVA  - continue ASA    # Constipation  - recommend bowel regimen.     #DVT PPx  - eliquis adjusted to 2.5 BID for renal fxn

## 2023-01-09 NOTE — PROGRESS NOTE ADULT - SUBJECTIVE AND OBJECTIVE BOX
Patient is a 88y old  Male who presents with a chief complaint of respiratory failure,  Debility (09 Jan 2023 11:06)    Patient seen and examined at bedside. No acute overnight events.     ALLERGIES:  No Known Allergies    MEDICATIONS  (STANDING):  allopurinol 100 milliGRAM(s) Oral daily  apixaban 2.5 milliGRAM(s) Oral every 12 hours  aspirin enteric coated 81 milliGRAM(s) Oral daily  benzonatate 200 milliGRAM(s) Oral three times a day  budesonide 160 MICROgram(s)/formoterol 4.5 MICROgram(s) Inhaler 2 Puff(s) Inhalation two times a day  hydrALAZINE 25 milliGRAM(s) Oral every 8 hours  metoprolol succinate ER 50 milliGRAM(s) Oral daily  multivitamin 1 Tablet(s) Oral daily  pantoprazole    Tablet 40 milliGRAM(s) Oral before breakfast  senna 2 Tablet(s) Oral at bedtime  tiotropium 2.5 MICROgram(s) Inhaler 2 Puff(s) Inhalation daily    MEDICATIONS  (PRN):  acetaminophen     Tablet .. 650 milliGRAM(s) Oral every 6 hours PRN Temp greater or equal to 38C (100.4F), Mild Pain (1 - 3), Moderate Pain (4 - 6)  albuterol    90 MICROgram(s) HFA Inhaler 2 Puff(s) Inhalation every 6 hours PRN Shortness of Breath  bisacodyl 5 milliGRAM(s) Oral every 12 hours PRN Constipation  hemorrhoidal Ointment 1 Application(s) Rectal two times a day PRN rectal itching/ pain  methyl salicylate 15%/menthol 10% Topical Cream 1 Application(s) Topical two times a day PRN pain  sodium chloride 0.65% Nasal 1 Spray(s) Both Nostrils every 6 hours PRN Nasal Congestion    Vital Signs Last 24 Hrs  T(F): 97.8 (09 Jan 2023 08:23), Max: 97.8 (09 Jan 2023 08:23)  HR: 67 (09 Jan 2023 08:23) (67 - 72)  BP: 149/66 (09 Jan 2023 08:23) (134/60 - 149/66)  RR: 17 (09 Jan 2023 08:23) (17 - 18)  SpO2: 98% (09 Jan 2023 09:30) (95% - 98%)  I&O's Summary    08 Jan 2023 07:01  -  09 Jan 2023 07:00  --------------------------------------------------------  IN: 150 mL / OUT: 600 mL / NET: -450 mL    PHYSICAL EXAM:  General: NAD, awake. +dysarthria   ENT: MMM, no tonsilar exudate  Neck: Supple, No JVD  Lungs: Clear to auscultation bilaterally, no wheezes. Good air entry bilaterally   Cardio: IRR, normal rate   Abdomen: Soft, Nontender, Nondistended; Bowel sounds present  Extremities: No calf tenderness, b/l LE edema L>R - improving     LABS:                        9.7    8.32  )-----------( 176      ( 09 Jan 2023 06:12 )             29.2       01-09    136  |  98  |  39  ----------------------------<  94  3.6   |  30  |  1.52    Ca    8.1      09 Jan 2023 06:12    TPro  6.6  /  Alb  2.3  /  TBili  0.5  /  DBili  x   /  AST  21  /  ALT  20  /  AlkPhos  50  01-09    COVID-19 PCR: [employer] (01-02-23 @ 10:55)  COVID-19 PCR: NotDetec (01-02-23 @ 06:00)    RADIOLOGY & ADDITIONAL TESTS:     Care Discussed with Consultants/Other Providers:

## 2023-01-09 NOTE — PROGRESS NOTE ADULT - ASSESSMENT
88 year old male with a PMH of CVA, CHF, HTN, afib on eliquis, hip fx s/p fall presented to [facility] on 12/17/22 with acute hypoxic respiratory failure secondary to flash pulmonary edema and acute CHF exacerbation. Also found to be Flu A positive, resulting in multifocal PNA. s/p tamiflu and cefepime.     # Debility secondary to acute hypoxic respiratory failure  - multifocal PNA, influenza A  - CXR 12/31 +infiltrates, improved, afebrile, tolerating therapy  - s/p steroid taper. Cefepime completed  - c/w symbicort, nasal spray, albuterol inhaler and tiotropium  - continue chest PT PRN  - Continue comprehensive Rehab Program of PT/OT - 3 hours a day, 5 days a week  - Precautions: cardiac, fall, respiratory,. AC    # Pulmonary Edema 2/2 acute on chronic CHF  - hold ARB due to poor renal function  - on lasix, reduced to 40mg daily per renal recs 1/5, received BID dosing over the weekend. Now edema better. Down to daily.   - c/w hydralazine and metoprolol  - monitor daily I/Os and weight    # IRINEO on CKD3  - hold ARB, can consider restarting on discharge from rehab as IRINEO resolves  - avoid nephrotoxins,  renally adjusted meds  - renal following: Cont lasix for now as daily.  -BMP    # chronic Afib  - c/w eliquis 2.5mg bid, GI ppx added-Protonix ok per renal  -guaiac check, CBC to follow    # Sleep  - melatonin PRN     #  stage 2 right buttock pressure ulcer.  - Cavilon to periwound, and alyvene foam dressing BID or PRN if soiled/removed    # Pain Mgmt   - Tylenol PRN    # GI/Bowel Mgmt   - Continent c/w Senna  - Preparation H for hemorrhoids    # FEN   - Diet - minced and moist + Thins  [CCHO, DASH/TLC]      # DVT PPX:  - doppler 12/25 , 1/6 negative DVT  - eliquis   - Increased LE swelling and discomfort 1/6. continue ACE wrapping, elevation.      88 year old male with a PMH of CVA, CHF, HTN, afib on eliquis, hip fx s/p fall presented to [facility] on 12/17/22 with acute hypoxic respiratory failure secondary to flash pulmonary edema and acute CHF exacerbation. Also found to be Flu A positive, resulting in multifocal PNA. s/p tamiflu and cefepime.     # Debility secondary to acute hypoxic respiratory failure  - multifocal PNA, influenza A  - CXR 12/31 +infiltrates, improved, afebrile, tolerating therapy  - s/p steroid taper. Cefepime completed  - c/w symbicort, nasal spray, albuterol inhaler and tiotropium  - continue chest PT PRN  - Continue comprehensive Rehab Program of PT/OT - 3 hours a day, 5 days a week  - Precautions: cardiac, fall, respiratory,. AC    # Pulmonary Edema 2/2 acute on chronic CHF  - hold ARB due to poor renal function  - on lasix, reduced to 40mg daily per renal recs 1/5, received BID dosing over the weekend. Now edema better. Down to daily.   - c/w hydralazine and metoprolol  - monitor daily I/Os and weight    # IRINEO on CKD3  - hold ARB, can consider restarting on discharge from rehab as IRINEO resolves  - avoid nephrotoxins,  renally adjusted meds  - renal following: Cont lasix for now as daily.  -BMP    # chronic Afib  - c/w eliquis 2.5mg bid, GI ppx added-Protonix ok per renal  -guaiac negative, CBC to follow    # Sleep  - melatonin PRN     #  stage 2 right buttock pressure ulcer.  - Cavilon to periwound, and alyvene foam dressing BID or PRN if soiled/removed    # Pain Mgmt   - Tylenol PRN    # GI/Bowel Mgmt   - Continent c/w Senna  - Preparation H for hemorrhoids    # FEN   - Diet - minced and moist + Thins  [CCHO, DASH/TLC]      # DVT PPX:  - doppler 12/25 , 1/6 negative DVT  - eliquis   - Increased LE swelling and discomfort 1/6. continue ACE wrapping, elevation- improved

## 2023-01-09 NOTE — PROGRESS NOTE ADULT - NS ATTEND AMEND GEN_ALL_CORE FT
Rehab Attending- Patient seen and examined by me  Case discussed, above note reviewed by me with modifications made    doing well  less Leg pain- swelling improved  labs reviewed by me- Cr sl better  reports constipation- last BM 1/8  on bowel program  to continue intensive rehab program

## 2023-01-09 NOTE — CHART NOTE - NSCHARTNOTEFT_GEN_A_CORE
Nutrition Follow Up Note  Hospital Course   (Per Electronic Medical Record)    Source:  Patient [X]  Nursing Staff [X]   Medical Record [X]      Diet: Diet, Soft and Bite Sized:   Low Sodium  Supplement Feeding Modality:  Oral  Ensure Enlive Cans or Servings Per Day:  1       Frequency:  Daily (23 @ 13:47) [Active]    Nutrition Follow Up: Patient with fair to adequate PO intakes at this time, consuming % of meals per nursing flow sheets. Receiving Ensure Plus High Protein 8oz PO Daily (Provides 350kcal & 20grams of Protein) to optimize nutritional status. Family also brings Boost ONS from outside hospital. Last BM noted on 23. Recommend continue current nutrition plan of care as tolerated.    Enteral/Parenteral Nutrition: Not Applicable    START [id number]    136  |  98  |  39<H>  ----------------------------<  94  3.6   |  30  |  1.52<H>    Ca    8.1<L>      2023 06:12    TPro  6.6  /  Alb  2.3<L>  /  TBili  0.5  /  DBili  x   /  AST  21  /  ALT  20  /  AlkPhos  50    END CHEMFISH  START MEDSACTIVEMEDICATIONS  (STANDING):  allopurinol 100 milliGRAM(s) Oral daily  apixaban 2.5 milliGRAM(s) Oral every 12 hours  aspirin enteric coated 81 milliGRAM(s) Oral daily  benzonatate 200 milliGRAM(s) Oral three times a day  budesonide 160 MICROgram(s)/formoterol 4.5 MICROgram(s) Inhaler 2 Puff(s) Inhalation two times a day  hydrALAZINE 25 milliGRAM(s) Oral every 8 hours  metoprolol succinate ER 50 milliGRAM(s) Oral daily  multivitamin 1 Tablet(s) Oral daily  pantoprazole    Tablet 40 milliGRAM(s) Oral before breakfast  senna 2 Tablet(s) Oral at bedtime  tiotropium 2.5 MICROgram(s) Inhaler 2 Puff(s) Inhalation daily    MEDICATIONS  (PRN):  acetaminophen     Tablet .. 650 milliGRAM(s) Oral every 6 hours PRN Temp greater or equal to 38C (100.4F), Mild Pain (1 - 3), Moderate Pain (4 - 6)  albuterol    90 MICROgram(s) HFA Inhaler 2 Puff(s) Inhalation every 6 hours PRN Shortness of Breath  bisacodyl 5 milliGRAM(s) Oral every 12 hours PRN Constipation  hemorrhoidal Ointment 1 Application(s) Rectal two times a day PRN rectal itching/ pain  methyl salicylate 15%/menthol 10% Topical Cream 1 Application(s) Topical two times a day PRN pain  sodium chloride 0.65% Nasal 1 Spray(s) Both Nostrils every 6 hours PRN Nasal Congestion  END MEDSACTIVE  START DIETORDEREND DIETORDER  START ADMITDXInfluenza due to other identified influenza virus with pneumonia    END ADMITDX  START IOFS  END IOFS  START SKINPUEND SKINPU    Pertinent Medications: MEDICATIONS  (STANDING):  allopurinol 100 milliGRAM(s) Oral daily  apixaban 2.5 milliGRAM(s) Oral every 12 hours  aspirin enteric coated 81 milliGRAM(s) Oral daily  benzonatate 200 milliGRAM(s) Oral three times a day  budesonide 160 MICROgram(s)/formoterol 4.5 MICROgram(s) Inhaler 2 Puff(s) Inhalation two times a day  hydrALAZINE 25 milliGRAM(s) Oral every 8 hours  metoprolol succinate ER 50 milliGRAM(s) Oral daily  multivitamin 1 Tablet(s) Oral daily  pantoprazole    Tablet 40 milliGRAM(s) Oral before breakfast  senna 2 Tablet(s) Oral at bedtime  tiotropium 2.5 MICROgram(s) Inhaler 2 Puff(s) Inhalation daily    MEDICATIONS  (PRN):  acetaminophen     Tablet .. 650 milliGRAM(s) Oral every 6 hours PRN Temp greater or equal to 38C (100.4F), Mild Pain (1 - 3), Moderate Pain (4 - 6)  albuterol    90 MICROgram(s) HFA Inhaler 2 Puff(s) Inhalation every 6 hours PRN Shortness of Breath  bisacodyl 5 milliGRAM(s) Oral every 12 hours PRN Constipation  hemorrhoidal Ointment 1 Application(s) Rectal two times a day PRN rectal itching/ pain  methyl salicylate 15%/menthol 10% Topical Cream 1 Application(s) Topical two times a day PRN pain  sodium chloride 0.65% Nasal 1 Spray(s) Both Nostrils every 6 hours PRN Nasal Congestion      Pertinent Labs:   Na136 mmol/L Glu 94 mg/dL K+ 3.6 mmol/L Cr  1.52 mg/dL<H> BUN 39 mg/dL<H>  Alb 2.3 g/dL<L>          Weight Trends:  Weight in k.6 (2023 05:23)  Weight in k.1 (2023 05:41)  Weight in k (2023 07:14)  Weight in k.5 (2023 05:51)  Weight in k.6 (2023 06:34)      Skin: Stage 2 pressure injury noted at right buttock    Edema: +2 edema noted at left foot/+3 at right foot    Last Bowel Movement:     Estimated Needs:   [X] No Change Since Previous Assessment    Previous Nutrition Diagnosis:   Severe Malnutrition    Nutrition Diagnosis is [X] Ongoing -       Interventions:   1. Recommend continue current nutrition plan of care as tolerated and honor patients daily food preferences as feasible with prescribed diet     Monitoring & Evaluation:   [X] Weights   [X] PO Intake   [X] Skin Integrity   [X] Follow Up (Per Protocol)  [X] Tolerance to Diet Prescription   [X] Other: Labs & POCT    Registered Dietitian/Nutritionist Remains Available.  Grant Tyson RD, CDN    Phone# [phone]
# Case discussed at IDT rounds 1/5/23    SW: Lives with family in a home with 1 ANDIE. Transitioning insurance/Medicare    OT:  Eating/grooming- S/S  UBD/LBD- Sup/Max  Toileting- Max    PT:  Amb-40' Min RW  Stairs- 4x CG    Goals- [place]-Sup for ADLs, ambulation    Discharge Planning- 1/12 Home
REHABILITATION DIAGNOSIS:  debility due to acute hypoxic respiratory failure      COMORBIDITIES/COMPLICATING CONDITIONS IMPACTING REHABILITATION:  HEALTH ISSUES - PROBLEM Dx:    influenza  pneumonia  acute on chronic CHF  acute renal insufficiency  stage 2 sacral decubitus  reduced balance    PAST MEDICAL & SURGICAL HISTORY:  Afib      Congestive heart failure (CHF)      CVA (cerebral vascular accident)      Hypertension, unspecified type      No significant past surgical history          Based upon consideration of the patient's impairments, functional status, complicating conditions and any other contributing factors and after information garnered from the assessments of all therapy disciplines involved in treating the patient and other pertinent clinicians:    INTERDISCIPLINARY REHABILITATION INTERVENTIONS:    [x   ] Transfer Training  [ x  ] Bed Mobility  [ x  ] Therapeutic Exercise  [  x ] Balance/Coordination Exercises  [ x  ] Locomotion training  [ x  ] Stairs    [   x] Functional transfers  [ x  ] Activities of daily living  [ x  ] Visual Perceptual training    [  x ] Bowel/Bladder program  [  x ] Pain Management  [  x ] Skin/Wound Care    [   ] Speech/Communication Exercise  [   ] Swallowing Exercises    [   ] Cognitive Exercises  [   ] Cognitive-linguistic Treatment  [   ] Behavioral Program    [ x  ] Patient/Family Counseling  [   ] Family Training  [   ] Community Re-entry  [   ] Orthotic Evaluation/Training  [   ] Prosthetic Eval/Training      MEDICAL PROGNOSIS  fair+    REHAB POTENTIAL:  good-  EXPECTED DAILY THERAPIES:    [ x  ] PT  [ x  ] OT  [   ] ST    EXPECTED INTENSITY OF PROGRAM:  3 hours daily    EXPECTED FREQUENCY OF PROGRAM:  5 x week    ESTIMATED LOS:  10-14 days    ESTIMATED DISPOSITION:  home with home care referral and home PT OT     INTERDISCIPLINARY FUNCTIONAL OUTCOMES/GOALS:         Gait/Mobility: mod independent level surfaces, household distances       Transfers: mod independent       ADLs: mod independent       Functional Transfers: supervision/CG       Medication Management: mod independent       Communication: mod independent basic needs       Cognitive: supervision iADLs       Nutrition: regular solid thin liquids       Bladder: continent       Bowel: continent

## 2023-01-09 NOTE — PROGRESS NOTE ADULT - SUBJECTIVE AND OBJECTIVE BOX
HPI:  88 year old male with a PMH of CVA, CHF, HTN, afib on eliquis, hip fx s/p fall and prior intubation for HF exacerbation presenting to [facility] ED for SOB and CP admitted to ICU at [facility] on 12/17/22 for acute hypoxic respiratory failure, flash pulmonary edema and acute CHF exacerbation. Also found to be Flu A positive, resulting in multifocal PNA. Pt was on BIPAP, weaned down to NC 4L. Tolerated well. Troponins were mildly elevatd, but stablized, likely demand ischemia. Pt initially treated w/ tamiflu for influenza A, later developed multifocal pneunonia, neg for MRSA and legionella. ID recommended Cefepime, which patient finished last dose of on 1/3/23. Patient downgraded to medicine on 12/21/22. Seen by PM&R, PT, OT who recommended acute rehab. Patient admitted to [facility] for acute rehab on 1/3/23.         ROS/Subjective 	NAD in chair. Night uneventful, no respiratory distress. No fever, denies any sob. Less cough reported. No chest pain, no NV. Reports constipation, poor appetite. LEs edema improved. Pain in LEs better now.   Anemia-guaiac    VITALS  Vital Signs Last 24 Hrs  T(C): 36.6 (09 Jan 2023 08:23), Max: 36.6 (09 Jan 2023 08:23)  T(F): 97.8 (09 Jan 2023 08:23), Max: 97.8 (09 Jan 2023 08:23)  HR: 67 (09 Jan 2023 08:23) (67 - 72)  BP: 149/66 (09 Jan 2023 08:23) (134/60 - 149/66)  BP(mean): --  RR: 17 (09 Jan 2023 08:23) (17 - 18)  SpO2: 95% (09 Jan 2023 08:23) (95% - 96%)    Parameters below as of 09 Jan 2023 08:23  Patient On (Oxygen Delivery Method): room air      RECENT LABS                        9.7    8.32  )-----------( 176      ( 09 Jan 2023 06:12 )             29.2     01-09    136  |  98  |  39<H>  ----------------------------<  94  3.6   |  30  |  1.52<H>    Ca    8.1<L>      09 Jan 2023 06:12    TPro  6.6  /  Alb  2.3<L>  /  TBili  0.5  /  DBili  x   /  AST  21  /  ALT  20  /  AlkPhos  50  01-09      LIVER FUNCTIONS - ( 09 Jan 2023 06:12 )  Alb: 2.3 g/dL / Pro: 6.6 g/dL / ALK PHOS: 50 U/L / ALT: 20 U/L / AST: 21 U/L / GGT: x             CURRENT MEDICATIONS  MEDICATIONS  (STANDING):  allopurinol 100 milliGRAM(s) Oral daily  apixaban 2.5 milliGRAM(s) Oral every 12 hours  aspirin enteric coated 81 milliGRAM(s) Oral daily  benzonatate 200 milliGRAM(s) Oral three times a day  budesonide 160 MICROgram(s)/formoterol 4.5 MICROgram(s) Inhaler 2 Puff(s) Inhalation two times a day  furosemide    Tablet 40 milliGRAM(s) Oral daily  hydrALAZINE 25 milliGRAM(s) Oral every 8 hours  metoprolol succinate ER 50 milliGRAM(s) Oral daily  multivitamin 1 Tablet(s) Oral daily  pantoprazole    Tablet 40 milliGRAM(s) Oral before breakfast  senna 2 Tablet(s) Oral at bedtime  tiotropium 2.5 MICROgram(s) Inhaler 2 Puff(s) Inhalation daily    MEDICATIONS  (PRN):  acetaminophen     Tablet .. 650 milliGRAM(s) Oral every 6 hours PRN Temp greater or equal to 38C (100.4F), Mild Pain (1 - 3), Moderate Pain (4 - 6)  albuterol    90 MICROgram(s) HFA Inhaler 2 Puff(s) Inhalation every 6 hours PRN Shortness of Breath  bisacodyl 5 milliGRAM(s) Oral every 12 hours PRN Constipation  hemorrhoidal Ointment 1 Application(s) Rectal two times a day PRN rectal itching/ pain  methyl salicylate 15%/menthol 10% Topical Cream 1 Application(s) Topical two times a day PRN pain  sodium chloride 0.65% Nasal 1 Spray(s) Both Nostrils every 6 hours PRN Nasal Congestion    Physical Exam:   · Constitutional Comments	alert +dysarthric +expressive difficulties and word finding errors No dyspnea on conversation but some dysphonia  · Respiratory	no respiratory distress  · Respiratory Comments	fair effort, no wheezing or rhonchi  · Cardiovascular	regular rate and rhythm; S1 S2 present; no gallops; no rub; no murmur  · Gastrointestinal	soft; nontender; nondistended; normal active bowel sounds  · Motor	left LE 1+ edema pretibial but compressible  no erythema or warmth no TTP  right LE 1+ edema    Continue comprehensive acute rehab program consisting of 3hrs/day of OT/PT. HPI:  88 year old male with a PMH of CVA, CHF, HTN, afib on eliquis, hip fx s/p fall and prior intubation for HF exacerbation presenting to [facility] ED for SOB and CP admitted to ICU at [facility] on 12/17/22 for acute hypoxic respiratory failure, flash pulmonary edema and acute CHF exacerbation. Also found to be Flu A positive, resulting in multifocal PNA. Pt was on BIPAP, weaned down to NC 4L. Tolerated well. Troponins were mildly elevatd, but stablized, likely demand ischemia. Pt initially treated w/ tamiflu for influenza A, later developed multifocal pneunonia, neg for MRSA and legionella. ID recommended Cefepime, which patient finished last dose of on 1/3/23. Patient downgraded to medicine on 12/21/22. Seen by PM&R, PT, OT who recommended acute rehab. Patient admitted to [facility] for acute rehab on 1/3/23.         ROS/Subjective 	NAD in chair. Night uneventful, no respiratory distress. No fever, denies any sob. Less cough reported. No chest pain, no NV. Reports constipation, poor appetite. LEs edema improved. Pain in LEs better now- using frank rodriguez  Anemia-guiac negative today  no dizziness, no headaches  no nausea, no vomiting  no SOB, no chest pain    VITALS  Vital Signs Last 24 Hrs  T(C): 36.6 (09 Jan 2023 08:23), Max: 36.6 (09 Jan 2023 08:23)  T(F): 97.8 (09 Jan 2023 08:23), Max: 97.8 (09 Jan 2023 08:23)  HR: 67 (09 Jan 2023 08:23) (67 - 72)  BP: 149/66 (09 Jan 2023 08:23) (134/60 - 149/66)  BP(mean): --  RR: 17 (09 Jan 2023 08:23) (17 - 18)  SpO2: 95% (09 Jan 2023 08:23) (95% - 96%)    Parameters below as of 09 Jan 2023 08:23  Patient On (Oxygen Delivery Method): room air      RECENT LABS                        9.7    8.32  )-----------( 176      ( 09 Jan 2023 06:12 )             29.2     01-09    136  |  98  |  39<H>  ----------------------------<  94  3.6   |  30  |  1.52<H>    Ca    8.1<L>      09 Jan 2023 06:12    TPro  6.6  /  Alb  2.3<L>  /  TBili  0.5  /  DBili  x   /  AST  21  /  ALT  20  /  AlkPhos  50  01-09      LIVER FUNCTIONS - ( 09 Jan 2023 06:12 )  Alb: 2.3 g/dL / Pro: 6.6 g/dL / ALK PHOS: 50 U/L / ALT: 20 U/L / AST: 21 U/L / GGT: x             CURRENT MEDICATIONS  MEDICATIONS  (STANDING):  allopurinol 100 milliGRAM(s) Oral daily  apixaban 2.5 milliGRAM(s) Oral every 12 hours  aspirin enteric coated 81 milliGRAM(s) Oral daily  benzonatate 200 milliGRAM(s) Oral three times a day  budesonide 160 MICROgram(s)/formoterol 4.5 MICROgram(s) Inhaler 2 Puff(s) Inhalation two times a day  furosemide    Tablet 40 milliGRAM(s) Oral daily  hydrALAZINE 25 milliGRAM(s) Oral every 8 hours  metoprolol succinate ER 50 milliGRAM(s) Oral daily  multivitamin 1 Tablet(s) Oral daily  pantoprazole    Tablet 40 milliGRAM(s) Oral before breakfast  senna 2 Tablet(s) Oral at bedtime  tiotropium 2.5 MICROgram(s) Inhaler 2 Puff(s) Inhalation daily    MEDICATIONS  (PRN):  acetaminophen     Tablet .. 650 milliGRAM(s) Oral every 6 hours PRN Temp greater or equal to 38C (100.4F), Mild Pain (1 - 3), Moderate Pain (4 - 6)  albuterol    90 MICROgram(s) HFA Inhaler 2 Puff(s) Inhalation every 6 hours PRN Shortness of Breath  bisacodyl 5 milliGRAM(s) Oral every 12 hours PRN Constipation  hemorrhoidal Ointment 1 Application(s) Rectal two times a day PRN rectal itching/ pain  methyl salicylate 15%/menthol 10% Topical Cream 1 Application(s) Topical two times a day PRN pain  sodium chloride 0.65% Nasal 1 Spray(s) Both Nostrils every 6 hours PRN Nasal Congestion    Physical Exam:   · Constitutional Comments	alert +dysarthric +expressive difficulties and word finding errors No dyspnea on conversation but some dysphonia  · Respiratory	no respiratory distress  · Respiratory Comments	fair effort, no wheezing or rhonchi  · Cardiovascular	regular rate and rhythm; S1 S2 present; no gallops; no rub; no murmur  · Gastrointestinal	soft; nontender; nondistended; normal active bowel sounds  · Motor	left LE 1+ edema pretibial but compressible  no erythema or warmth no TTP  right LE 1+ edema    Continue comprehensive acute rehab program consisting of 3hrs/day of OT/PT.

## 2023-01-10 LAB
ANION GAP SERPL CALC-SCNC: 9 MMOL/L — SIGNIFICANT CHANGE UP (ref 5–17)
BUN SERPL-MCNC: 41 MG/DL — HIGH (ref 7–23)
CALCIUM SERPL-MCNC: 8.4 MG/DL — SIGNIFICANT CHANGE UP (ref 8.4–10.5)
CHLORIDE SERPL-SCNC: 101 MMOL/L — SIGNIFICANT CHANGE UP (ref 96–108)
CO2 SERPL-SCNC: 30 MMOL/L — SIGNIFICANT CHANGE UP (ref 22–31)
CREAT SERPL-MCNC: 1.53 MG/DL — HIGH (ref 0.5–1.3)
EGFR: 43 ML/MIN/1.73M2 — LOW
GLUCOSE SERPL-MCNC: 101 MG/DL — HIGH (ref 70–99)
POTASSIUM SERPL-MCNC: 3.9 MMOL/L — SIGNIFICANT CHANGE UP (ref 3.5–5.3)
POTASSIUM SERPL-SCNC: 3.9 MMOL/L — SIGNIFICANT CHANGE UP (ref 3.5–5.3)
SODIUM SERPL-SCNC: 140 MMOL/L — SIGNIFICANT CHANGE UP (ref 135–145)

## 2023-01-10 PROCEDURE — 99232 SBSQ HOSP IP/OBS MODERATE 35: CPT | Mod: GC

## 2023-01-10 PROCEDURE — 99232 SBSQ HOSP IP/OBS MODERATE 35: CPT

## 2023-01-10 RX ADMIN — Medication 25 MILLIGRAM(S): at 21:30

## 2023-01-10 RX ADMIN — Medication 25 MILLIGRAM(S): at 14:06

## 2023-01-10 RX ADMIN — Medication 100 MILLIGRAM(S): at 12:21

## 2023-01-10 RX ADMIN — SENNA PLUS 2 TABLET(S): 8.6 TABLET ORAL at 21:30

## 2023-01-10 RX ADMIN — Medication 40 MILLIGRAM(S): at 14:06

## 2023-01-10 RX ADMIN — APIXABAN 2.5 MILLIGRAM(S): 2.5 TABLET, FILM COATED ORAL at 05:49

## 2023-01-10 RX ADMIN — Medication 25 MILLIGRAM(S): at 05:49

## 2023-01-10 RX ADMIN — APIXABAN 2.5 MILLIGRAM(S): 2.5 TABLET, FILM COATED ORAL at 17:30

## 2023-01-10 RX ADMIN — Medication 200 MILLIGRAM(S): at 14:06

## 2023-01-10 RX ADMIN — Medication 40 MILLIGRAM(S): at 05:49

## 2023-01-10 RX ADMIN — Medication 81 MILLIGRAM(S): at 12:21

## 2023-01-10 RX ADMIN — Medication 1 TABLET(S): at 12:22

## 2023-01-10 RX ADMIN — Medication 50 MILLIGRAM(S): at 05:49

## 2023-01-10 RX ADMIN — PANTOPRAZOLE SODIUM 40 MILLIGRAM(S): 20 TABLET, DELAYED RELEASE ORAL at 05:49

## 2023-01-10 NOTE — PROGRESS NOTE ADULT - SUBJECTIVE AND OBJECTIVE BOX
HPI:  88 year old male with a PMH of CVA, CHF, HTN, afib on eliquis, hip fx s/p fall and prior intubation for HF exacerbation presenting to [facility] ED for SOB and CP admitted to ICU at [facility] on 12/17/22 for acute hypoxic respiratory failure, flash pulmonary edema and acute CHF exacerbation. Also found to be Flu A positive, resulting in multifocal PNA. Pt was on BIPAP, weaned down to NC 4L. Tolerated well. Troponins were mildly elevatd, but stablized, likely demand ischemia. Pt initially treated w/ tamiflu for influenza A, later developed multifocal pneunonia, neg for MRSA and legionella. ID recommended Cefepime, which patient finished last dose of on 1/3/23. Patient downgraded to medicine on 12/21/22. Seen by PM&R, PT, OT who recommended acute rehab. Patient admitted to [facility] for acute rehab on 1/3/23.         ROS/Subjective 	NAD in chair. Night uneventful, no respiratory distress. No fever, denies any sob. Less cough reported. No chest pain, no NV. Moved bowels. LEs edema improved. Pain in LEs better now- using frank rodriguez  Anemia-guiac negative today  no dizziness, no headaches  no nausea, no vomiting  no SOB, no chest pain    MEDICATIONS  (STANDING):  allopurinol 100 milliGRAM(s) Oral daily  apixaban 2.5 milliGRAM(s) Oral every 12 hours  aspirin enteric coated 81 milliGRAM(s) Oral daily  benzonatate 200 milliGRAM(s) Oral three times a day  budesonide 160 MICROgram(s)/formoterol 4.5 MICROgram(s) Inhaler 2 Puff(s) Inhalation two times a day  furosemide    Tablet 40 milliGRAM(s) Oral two times a day  hydrALAZINE 25 milliGRAM(s) Oral every 8 hours  metoprolol succinate ER 50 milliGRAM(s) Oral daily  multivitamin 1 Tablet(s) Oral daily  pantoprazole    Tablet 40 milliGRAM(s) Oral before breakfast  senna 2 Tablet(s) Oral at bedtime  tiotropium 2.5 MICROgram(s) Inhaler 2 Puff(s) Inhalation daily    MEDICATIONS  (PRN):  acetaminophen     Tablet .. 650 milliGRAM(s) Oral every 6 hours PRN Temp greater or equal to 38C (100.4F), Mild Pain (1 - 3), Moderate Pain (4 - 6)  albuterol    90 MICROgram(s) HFA Inhaler 2 Puff(s) Inhalation every 6 hours PRN Shortness of Breath  bisacodyl 5 milliGRAM(s) Oral every 12 hours PRN Constipation  hemorrhoidal Ointment 1 Application(s) Rectal two times a day PRN rectal itching/ pain  methyl salicylate 15%/menthol 10% Topical Cream 1 Application(s) Topical two times a day PRN pain  sodium chloride 0.65% Nasal 1 Spray(s) Both Nostrils every 6 hours PRN Nasal Congestion                            9.7    8.32  )-----------( 176      ( 09 Jan 2023 06:12 )             29.2     01-10    140  |  101  |  41<H>  ----------------------------<  101<H>  3.9   |  30  |  1.53<H>    Ca    8.4      10 Blair 2023 05:40    TPro  6.6  /  Alb  2.3<L>  /  TBili  0.5  /  DBili  x   /  AST  21  /  ALT  20  /  AlkPhos  50  01-09        CAPILLARY BLOOD GLUCOSE          Vital Signs Last 24 Hrs  T(C): 36.6 (10 Blair 2023 07:32), Max: 36.6 (10 Blair 2023 07:32)  T(F): 97.9 (10 Blair 2023 07:32), Max: 97.9 (10 Blair 2023 07:32)  HR: 85 (10 Blair 2023 07:32) (66 - 85)  BP: 136/69 (10 Blair 2023 07:32) (130/66 - 140/80)  BP(mean): --  RR: 17 (10 Blair 2023 07:32) (17 - 18)  SpO2: 94% (10 Blair 2023 08:20) (94% - 98%)    Parameters below as of 10 Blair 2023 08:20  Patient On (Oxygen Delivery Method): room air                  Physical Exam:   · Constitutional Comments	alert +dysarthric +expressive difficulties and word finding errors No dyspnea on conversation but some dysphonia  · Respiratory	no respiratory distress  · Respiratory Comments	fair effort, no wheezing or rhonchi  · Cardiovascular	regular rate and rhythm; S1 S2 present; no gallops; no rub; no murmur  · Gastrointestinal	soft; nontender; nondistended; normal active bowel sounds  · Motor	left LE 1+ edema pretibial but compressible  no erythema or warmth no TTP  right LE 1+ edema    Continue comprehensive acute rehab program consisting of 3hrs/day of OT/PT.

## 2023-01-10 NOTE — PROGRESS NOTE ADULT - SUBJECTIVE AND OBJECTIVE BOX
No distress, comfortable on RA    Vital Signs Last 24 Hrs  T(C): 36.6 (01-10-23 @ 07:32), Max: 36.6 (01-10-23 @ 07:32)  T(F): 97.9 (01-10-23 @ 07:32), Max: 97.9 (01-10-23 @ 07:32)  HR: 85 (01-10-23 @ 19:25) (70 - 85)  BP: 136/69 (01-10-23 @ 07:32) (136/69 - 140/80)  RR: 17 (01-10-23 @ 07:32) (17 - 17)  SpO2: 95% (01-10-23 @ 19:25) (94% - 95%)    s1s2  b/l air entry  soft, ND  + edema, stasis changes                                                                                                  9.7    8.32  )-----------( 176      ( 09 Jan 2023 06:12 )             29.2     10 Blair 2023 05:40    140    |  101    |  41     ----------------------------<  101    3.9     |  30     |  1.53     Ca    8.4        10 Blair 2023 05:40    TPro  6.6    /  Alb  2.3    /  TBili  0.5    /  DBili  x      /  AST  21     /  ALT  20     /  AlkPhos  50     09 Jan 2023 06:12    LIVER FUNCTIONS - ( 09 Jan 2023 06:12 )  Alb: 2.3 g/dL / Pro: 6.6 g/dL / ALK PHOS: 50 U/L / ALT: 20 U/L / AST: 21 U/L / GGT: x           acetaminophen     Tablet .. 650 milliGRAM(s) Oral every 6 hours PRN  albuterol    90 MICROgram(s) HFA Inhaler 2 Puff(s) Inhalation every 6 hours PRN  allopurinol 100 milliGRAM(s) Oral daily  apixaban 2.5 milliGRAM(s) Oral every 12 hours  aspirin enteric coated 81 milliGRAM(s) Oral daily  benzonatate 200 milliGRAM(s) Oral three times a day  bisacodyl 5 milliGRAM(s) Oral every 12 hours PRN  budesonide 160 MICROgram(s)/formoterol 4.5 MICROgram(s) Inhaler 2 Puff(s) Inhalation two times a day  furosemide    Tablet 40 milliGRAM(s) Oral two times a day  hemorrhoidal Ointment 1 Application(s) Rectal two times a day PRN  hydrALAZINE 25 milliGRAM(s) Oral every 8 hours  methyl salicylate 15%/menthol 10% Topical Cream 1 Application(s) Topical two times a day PRN  metoprolol succinate ER 50 milliGRAM(s) Oral daily  multivitamin 1 Tablet(s) Oral daily  pantoprazole    Tablet 40 milliGRAM(s) Oral before breakfast  senna 2 Tablet(s) Oral at bedtime  sodium chloride 0.65% Nasal 1 Spray(s) Both Nostrils every 6 hours PRN  tiotropium 2.5 MICROgram(s) Inhaler 2 Puff(s) Inhalation daily    A/P:    S/p Flu A, b/l PNA  CM, mod MR, TR, EF 45-50%  CKD 3 fairly stable  UA bland  Renal SONO w/o hydro  Continue Lasix 40mg PO BID  Avoid nephrotoxins  F/u [facility]     [phone]

## 2023-01-10 NOTE — PROGRESS NOTE ADULT - ASSESSMENT
88 year old male with a PMH of CVA, CHF, HTN, afib on eliquis, hip fx s/p fall presented to [facility] on 12/17/22 with acute hypoxic respiratory failure secondary to flash pulmonary edema and acute CHF exacerbation. Also found to be Flu A positive, resulting in multifocal PNA. s/p tamiflu and cefepime.     # Debility secondary to acute hypoxic respiratory failure  - multifocal PNA, influenza A  - CXR 12/31 +infiltrates, improved, afebrile, tolerating therapy  - s/p steroid taper. Cefepime completed  - c/w symbicort, nasal spray, albuterol inhaler and tiotropium  - continue chest PT PRN  - Continue comprehensive Rehab Program of PT/OT - 3 hours a day, 5 days a week  - Precautions: cardiac, fall, respiratory,. AC    # Pulmonary Edema 2/2 acute on chronic CHF  - hold ARB due to poor renal function  - on lasix, reduced to 40mg daily per renal recs 1/5, received BID dosing over the weekend. Now edema better. Down to daily.   - c/w hydralazine and metoprolol  - monitor daily I/Os and weight    # IRINEO on CKD3  - hold ARB, can consider restarting on discharge from rehab as IRINEO resolves  - avoid nephrotoxins,  renally adjusted meds  - renal following: Cont lasix for now as daily.  -BMP    # chronic Afib  - c/w eliquis 2.5mg bid, GI ppx added-Protonix ok per renal  -guaiac negative, CBC to follow    # Sleep  - melatonin PRN     #  stage 2 right buttock pressure ulcer.  - Cavilon to periwound, and alyvene foam dressing BID or PRN if soiled/removed    # Pain Mgmt   - Tylenol PRN    # GI/Bowel Mgmt   - Continent c/w Senna  - Preparation H for hemorrhoids    # FEN   - Diet - minced and moist + Thins  [CCHO, DASH/TLC]      # DVT PPX:  - doppler 12/25 , 1/6 negative DVT  - eliquis   - Increased LE swelling and discomfort 1/6. continue ACE wrapping, elevation- improved     88 year old male with a PMH of CVA, CHF, HTN, afib on eliquis, hip fx s/p fall presented to [facility] on 12/17/22 with acute hypoxic respiratory failure secondary to flash pulmonary edema and acute CHF exacerbation. Also found to be Flu A positive, resulting in multifocal PNA. s/p tamiflu and cefepime.     # Debility secondary to acute hypoxic respiratory failure  - multifocal PNA, influenza A  - CXR 12/31 +infiltrates, improved, afebrile, tolerating therapy  - s/p steroid taper. Cefepime completed  - c/w symbicort, nasal spray, albuterol inhaler and tiotropium  - continue chest PT PRN  - Continue comprehensive Rehab Program of PT/OT - 3 hours a day, 5 days a week  - Precautions: cardiac, fall, respiratory,. AC    # Pulmonary Edema 2/2 acute on chronic CHF  - hold ARB due to poor renal function  - on lasix, reduced to 40mg daily per renal recs 1/5, received BID dosing over the weekend. Now edema better. Down to daily.   - c/w hydralazine and metoprolol  - monitor daily I/Os and weight    # IRINEO on CKD3  - hold ARB, can consider restarting on discharge from rehab as IRINEO resolves  - avoid nephrotoxins,  renally adjusted meds  - renal following: Cont lasix for now as daily.  -BMP -Cr better 1.53 1/10    # chronic Afib  - c/w eliquis 2.5mg bid, GI ppx added-Protonix ok per renal  -guaiac negative, CBC to follow    anemia  ? chronic disease  guiac negative   follow CBC    # Sleep  - melatonin PRN     #  stage 2 right buttock pressure ulcer.  - Cavilon to periwound, and alyvene foam dressing BID or PRN if soiled/removed    # Pain Mgmt   - Tylenol PRN    # GI/Bowel Mgmt   - Continent c/w Senna  - Preparation H for hemorrhoids    # FEN   - Diet - minced and moist + Thins  [CCHO, DASH/TLC]      # DVT PPX:  - doppler 12/25 , 1/6 negative DVT  - eliquis   - Increased LE swelling and discomfort 1/6. continue ACE wrapping, elevation- improved

## 2023-01-10 NOTE — PROGRESS NOTE ADULT - ASSESSMENT
88 year old male with a PMH of CVA, CHF, HTN, afib on eliquis, hip fx s/p fall and prior intubation for HF exacerbation presenting to [facility] ED for SOB and CP admitted to ICU for acute hypoxix respiratory failure, flash pulmonary edema and acute CHF exacerbation also found to be Flu A positive, s/p tamiflu, downgraded to medical floors. Treated for superimposed bacterial pneumonia w/ 7 days cefepime. Now at [facility] AR for debility from prolonged hospital stay.    # Debility  - PT/OT per PMR, pain control, and bowel regimen.    #Shortness of breath  #multifocal PNA  #influenza A  #Acute hypoxic respiratory failure -resolved.  - continue symbicort, nasal spray  - albuterol inhaler and tiotropium  - continue chest PT    - can use O2 intermittently if needed, goal to titrate off completely prior to AR discharge.    # Chronic Combined CHF    - echo 12/17 with EF 45-50%  - lasix 40mg daily given uptrending Cr  - Hold ARB due to renal function-  was on valsartan in March but has not used since then.   - continue hydralazine 25 mg q8h, metoprolol succ 50 mg daily  - cardiology OP follow up to optimize meds  - monitor volume status, daily weights, I/Os, replete lytes as needed  - cont medical management, cont asa, eliquis, bb    #CKD 3  - hold ARB due to CKD- was on valsartan in March but has not used since then  - avoid nephrotoxins, renally adjusted meds, monitor BMP  - appreciate Renal consult - d/w Dr. Pineda. Cont lasix for now- May need to accept a higher creatinine.      #Chronic Atrial Fibrillation  - continue metoprolol for rate control, eliquis 2.5mg bid. If renal function stable (Cr <1.5) will re-adjust dose to 5mg bid    #History of CVA  - continue ASA    # Constipation  - recommend bowel regimen.     #DVT PPx  - eliquis adjusted to 2.5 BID for renal fxn

## 2023-01-10 NOTE — PROGRESS NOTE ADULT - SUBJECTIVE AND OBJECTIVE BOX
Patient is a 88y old  Male who presents with a chief complaint of respiratory failure,  Debility (09 Jan 2023 21:09)    Patient seen and examined at bedside. No acute overnight events. Slept better last night.     ALLERGIES:  No Known Allergies    MEDICATIONS  (STANDING):  allopurinol 100 milliGRAM(s) Oral daily  apixaban 2.5 milliGRAM(s) Oral every 12 hours  aspirin enteric coated 81 milliGRAM(s) Oral daily  benzonatate 200 milliGRAM(s) Oral three times a day  budesonide 160 MICROgram(s)/formoterol 4.5 MICROgram(s) Inhaler 2 Puff(s) Inhalation two times a day  furosemide    Tablet 40 milliGRAM(s) Oral two times a day  hydrALAZINE 25 milliGRAM(s) Oral every 8 hours  metoprolol succinate ER 50 milliGRAM(s) Oral daily  multivitamin 1 Tablet(s) Oral daily  pantoprazole    Tablet 40 milliGRAM(s) Oral before breakfast  senna 2 Tablet(s) Oral at bedtime  tiotropium 2.5 MICROgram(s) Inhaler 2 Puff(s) Inhalation daily    MEDICATIONS  (PRN):  acetaminophen     Tablet .. 650 milliGRAM(s) Oral every 6 hours PRN Temp greater or equal to 38C (100.4F), Mild Pain (1 - 3), Moderate Pain (4 - 6)  albuterol    90 MICROgram(s) HFA Inhaler 2 Puff(s) Inhalation every 6 hours PRN Shortness of Breath  bisacodyl 5 milliGRAM(s) Oral every 12 hours PRN Constipation  hemorrhoidal Ointment 1 Application(s) Rectal two times a day PRN rectal itching/ pain  methyl salicylate 15%/menthol 10% Topical Cream 1 Application(s) Topical two times a day PRN pain  sodium chloride 0.65% Nasal 1 Spray(s) Both Nostrils every 6 hours PRN Nasal Congestion    Vital Signs Last 24 Hrs  T(F): 97.9 (10 Blair 2023 07:32), Max: 97.9 (10 Blair 2023 07:32)  HR: 85 (10 Blair 2023 07:32) (66 - 85)  BP: 136/69 (10 Blair 2023 07:32) (130/66 - 140/80)  RR: 17 (10 Blair 2023 07:32) (17 - 18)  SpO2: 94% (10 Blair 2023 08:20) (94% - 98%)  I&O's Summary    PHYSICAL EXAM:  General: NAD, awake, alert. +dysarthria  ENT: MMM, no tonsilar exudate  Neck: Supple, No JVD  Lungs: Clear to auscultation bilaterally, no wheezes. Good air entry bilaterally   Cardio: IRR. normal rate  Abdomen: Soft, Nontender, Nondistended; Bowel sounds present  Extremities: No calf tenderness, LE swelling improving     LABS:                        9.7    8.32  )-----------( 176      ( 09 Jan 2023 06:12 )             29.2       01-10    140  |  101  |  41  ----------------------------<  101  3.9   |  30  |  1.53    Ca    8.4      10 Blair 2023 05:40    TPro  6.6  /  Alb  2.3  /  TBili  0.5  /  DBili  x   /  AST  21  /  ALT  20  /  AlkPhos  50  01-09     COVID-19 PCR: NotDetec (01-02-23 @ 10:55)  COVID-19 PCR: NotDetec (01-02-23 @ 06:00)    RADIOLOGY & ADDITIONAL TESTS:     Care Discussed with Consultants/Other Providers:

## 2023-01-11 ENCOUNTER — TRANSCRIPTION ENCOUNTER (OUTPATIENT)
Age: 88
End: 2023-01-11

## 2023-01-11 PROCEDURE — 99232 SBSQ HOSP IP/OBS MODERATE 35: CPT

## 2023-01-11 PROCEDURE — 99232 SBSQ HOSP IP/OBS MODERATE 35: CPT | Mod: GC

## 2023-01-11 RX ADMIN — Medication 81 MILLIGRAM(S): at 11:35

## 2023-01-11 RX ADMIN — Medication 40 MILLIGRAM(S): at 14:26

## 2023-01-11 RX ADMIN — Medication 1 TABLET(S): at 11:35

## 2023-01-11 RX ADMIN — Medication 25 MILLIGRAM(S): at 14:25

## 2023-01-11 RX ADMIN — APIXABAN 2.5 MILLIGRAM(S): 2.5 TABLET, FILM COATED ORAL at 05:43

## 2023-01-11 RX ADMIN — Medication 25 MILLIGRAM(S): at 22:35

## 2023-01-11 RX ADMIN — Medication 100 MILLIGRAM(S): at 11:35

## 2023-01-11 RX ADMIN — Medication 40 MILLIGRAM(S): at 05:43

## 2023-01-11 RX ADMIN — APIXABAN 2.5 MILLIGRAM(S): 2.5 TABLET, FILM COATED ORAL at 17:02

## 2023-01-11 RX ADMIN — Medication 50 MILLIGRAM(S): at 05:43

## 2023-01-11 RX ADMIN — Medication 25 MILLIGRAM(S): at 05:42

## 2023-01-11 RX ADMIN — PANTOPRAZOLE SODIUM 40 MILLIGRAM(S): 20 TABLET, DELAYED RELEASE ORAL at 05:43

## 2023-01-11 RX ADMIN — SENNA PLUS 2 TABLET(S): 8.6 TABLET ORAL at 22:35

## 2023-01-11 NOTE — PROGRESS NOTE ADULT - ASSESSMENT
88 year old male with a PMH of CVA, CHF, HTN, afib on eliquis, hip fx s/p fall presented to [facility] on 12/17/22 with acute hypoxic respiratory failure secondary to flash pulmonary edema and acute CHF exacerbation. Also found to be Flu A positive, resulting in multifocal PNA. s/p tamiflu and cefepime.     # Debility secondary to acute hypoxic respiratory failure  - multifocal PNA, influenza A  - CXR 12/31 +infiltrates, improved, afebrile, tolerating therapy  - s/p steroid taper. Cefepime completed  - c/w symbicort, nasal spray, albuterol inhaler and tiotropium  - continue chest PT PRN  - Continue comprehensive Rehab Program of PT/OT - 3 hours a day, 5 days a week  - Precautions: cardiac, fall, respiratory,. AC  ANTICIPATE DC IN AM    # Pulmonary Edema 2/2 acute on chronic CHF  - hold ARB due to poor renal function  - on lasix BID dosing per renal. Now edema better  - c/w hydralazine and metoprolol  - monitor daily I/Os and weight    # IRINEO on CKD3  - hold ARB, can consider restarting on discharge from rehab as IRINEO resolves  - avoid nephrotoxins,  renally adjusted meds  - renal following: Cont lasix for now as daily.  -BMP -Cr better 1.53 1/10- labs in AM    # chronic Afib  - c/w eliquis 2.5mg bid, GI ppx added-Protonix ok per renal  -guaiac negative, CBC to follow    anemia  ? chronic disease  guiac negative   follow CBC    # Sleep  - melatonin PRN     #  stage 2 right buttock pressure ulcer.  - Cavilon to periwound, and alyvene foam dressing BID or PRN if soiled/removed    # Pain Mgmt   - Tylenol PRN    # GI/Bowel Mgmt   - Continent c/w Senna  - Preparation H for hemorrhoids    # FEN   - Diet - minced and moist + Thins  [CCHO, DASH/TLC]      # DVT PPX:  - doppler 12/25 , 1/6 negative DVT  - eliquis   - Increased LE swelling and discomfort 1/6. continue ACE wrapping, elevation- edema resolved

## 2023-01-11 NOTE — PROGRESS NOTE ADULT - SUBJECTIVE AND OBJECTIVE BOX
Medicine Progress Note    Patient is a 88y old  Male who presents with a chief complaint of respiratory failure,  Debility (11 Jan 2023 12:07)      SUBJECTIVE / OVERNIGHT EVENTS:  seen and examined  Chart reviewed  No overnight events  Limb weakness improving with therapy  BM+  No pain  No complaints    ADDITIONAL REVIEW OF SYSTEMS:  no fever/chills/CP/sob/palpitation/dizziness/ abd pain/nausea/vomiting/diarrhea/constipation/headaches. all other ROS neg    MEDICATIONS  (STANDING):  allopurinol 100 milliGRAM(s) Oral daily  apixaban 2.5 milliGRAM(s) Oral every 12 hours  aspirin enteric coated 81 milliGRAM(s) Oral daily  benzonatate 200 milliGRAM(s) Oral three times a day  budesonide 160 MICROgram(s)/formoterol 4.5 MICROgram(s) Inhaler 2 Puff(s) Inhalation two times a day  furosemide    Tablet 40 milliGRAM(s) Oral two times a day  hydrALAZINE 25 milliGRAM(s) Oral every 8 hours  metoprolol succinate ER 50 milliGRAM(s) Oral daily  multivitamin 1 Tablet(s) Oral daily  pantoprazole    Tablet 40 milliGRAM(s) Oral before breakfast  senna 2 Tablet(s) Oral at bedtime  tiotropium 2.5 MICROgram(s) Inhaler 2 Puff(s) Inhalation daily    MEDICATIONS  (PRN):  acetaminophen     Tablet .. 650 milliGRAM(s) Oral every 6 hours PRN Temp greater or equal to 38C (100.4F), Mild Pain (1 - 3), Moderate Pain (4 - 6)  albuterol    90 MICROgram(s) HFA Inhaler 2 Puff(s) Inhalation every 6 hours PRN Shortness of Breath  bisacodyl 5 milliGRAM(s) Oral every 12 hours PRN Constipation  hemorrhoidal Ointment 1 Application(s) Rectal two times a day PRN rectal itching/ pain  methyl salicylate 15%/menthol 10% Topical Cream 1 Application(s) Topical two times a day PRN pain  sodium chloride 0.65% Nasal 1 Spray(s) Both Nostrils every 6 hours PRN Nasal Congestion    CAPILLARY BLOOD GLUCOSE        I&O's Summary    10 Blair 2023 07:01  -  11 Jan 2023 07:00  --------------------------------------------------------  IN: 125 mL / OUT: 450 mL / NET: -325 mL        PHYSICAL EXAM:  Vital Signs Last 24 Hrs  T(C): 36.6 (11 Jan 2023 07:21), Max: 36.6 (11 Jan 2023 07:21)  T(F): 97.8 (11 Jan 2023 07:21), Max: 97.8 (11 Jan 2023 07:21)  HR: 63 (11 Jan 2023 08:21) (63 - 85)  BP: 146/64 (11 Jan 2023 07:21) (132/70 - 150/80)  BP(mean): --  RR: 18 (11 Jan 2023 07:21) (18 - 18)  SpO2: 94% (11 Jan 2023 08:21) (94% - 97%)    Parameters below as of 11 Jan 2023 08:21  Patient On (Oxygen Delivery Method): room air      GENERAL: Not in distress. Alert    HEENT: clear conjuctiva, MMM. no pallor or icterus  CARDIOVASCULAR: RRR S1, S2. No murmur/rubs/gallop  LUNGS: BLAE+, no rales, no wheezing, no rhonchi.    ABDOMEN: ND. Soft,  NT, no guarding / rebound / rigidity. BS normoactive  BACK: No spine tenderness.  EXTREMITIES: + edema. no leg or calf TP.  SKIN: warm and dry  PSYCHIATRIC: Calm.  No agitation.  CNS:AAO*3. moving limbs    LABS:    01-10    140  |  101  |  41<H>  ----------------------------<  101<H>  3.9   |  30  |  1.53<H>    Ca    8.4      10 Blair 2023 05:40                COVID-19 PCR: NotDetec (02 Jan 2023 10:55)  COVID-19 PCR: NotDetec (02 Jan 2023 06:00)      RADIOLOGY & ADDITIONAL TESTS:  Imaging from Last 24 Hours:    Electrocardiogram/QTc Interval:    COORDINATION OF CARE:  Care Discussed with Consultants/Other Providers:

## 2023-01-11 NOTE — PROGRESS NOTE ADULT - SUBJECTIVE AND OBJECTIVE BOX
HPI:  88 year old male with a PMH of CVA, CHF, HTN, afib on eliquis, hip fx s/p fall and prior intubation for HF exacerbation presenting to [facility] ED for SOB and CP admitted to ICU at [facility] on 12/17/22 for acute hypoxic respiratory failure, flash pulmonary edema and acute CHF exacerbation. Also found to be Flu A positive, resulting in multifocal PNA. Pt was on BIPAP, weaned down to NC 4L. Tolerated well. Troponins were mildly elevatd, but stablized, likely demand ischemia. Pt initially treated w/ tamiflu for influenza A, later developed multifocal pneunonia, neg for MRSA and legionella. ID recommended Cefepime, which patient finished last dose of on 1/3/23. Patient downgraded to medicine on 12/21/22. Seen by PM&R, PT, OT who recommended acute rehab. Patient admitted to [facility] for acute rehab on 1/3/23.         ROS/Subjective 	Patient seen in OT. Night uneventful, no respiratory distress. No fever, denies any sob. Less cough reported. No chest pain, no NV. last BM 1/10. LEs edema improved. Pain in LEs resolved  Anemia-guiac negative   no dizziness, no headaches  no nausea, no vomiting  no SOB, no chest pain  PVR 70cc  Ready for discharge in AM- family training done this past Monday    MEDICATIONS  (STANDING):  allopurinol 100 milliGRAM(s) Oral daily  apixaban 2.5 milliGRAM(s) Oral every 12 hours  aspirin enteric coated 81 milliGRAM(s) Oral daily  benzonatate 200 milliGRAM(s) Oral three times a day  budesonide 160 MICROgram(s)/formoterol 4.5 MICROgram(s) Inhaler 2 Puff(s) Inhalation two times a day  furosemide    Tablet 40 milliGRAM(s) Oral two times a day  hydrALAZINE 25 milliGRAM(s) Oral every 8 hours  metoprolol succinate ER 50 milliGRAM(s) Oral daily  multivitamin 1 Tablet(s) Oral daily  pantoprazole    Tablet 40 milliGRAM(s) Oral before breakfast  senna 2 Tablet(s) Oral at bedtime  tiotropium 2.5 MICROgram(s) Inhaler 2 Puff(s) Inhalation daily    MEDICATIONS  (PRN):  acetaminophen     Tablet .. 650 milliGRAM(s) Oral every 6 hours PRN Temp greater or equal to 38C (100.4F), Mild Pain (1 - 3), Moderate Pain (4 - 6)  albuterol    90 MICROgram(s) HFA Inhaler 2 Puff(s) Inhalation every 6 hours PRN Shortness of Breath  bisacodyl 5 milliGRAM(s) Oral every 12 hours PRN Constipation  hemorrhoidal Ointment 1 Application(s) Rectal two times a day PRN rectal itching/ pain  methyl salicylate 15%/menthol 10% Topical Cream 1 Application(s) Topical two times a day PRN pain  sodium chloride 0.65% Nasal 1 Spray(s) Both Nostrils every 6 hours PRN Nasal Congestion        01-10    140  |  101  |  41<H>  ----------------------------<  101<H>  3.9   |  30  |  1.53<H>    Ca    8.4      10 Blair 2023 05:40          Vital Signs Last 24 Hrs  T(C): 36.6 (11 Jan 2023 07:21), Max: 36.6 (11 Jan 2023 07:21)  T(F): 97.8 (11 Jan 2023 07:21), Max: 97.8 (11 Jan 2023 07:21)  HR: 63 (11 Jan 2023 08:21) (63 - 85)  BP: 146/64 (11 Jan 2023 07:21) (132/70 - 150/80)  BP(mean): --  RR: 18 (11 Jan 2023 07:21) (18 - 18)  SpO2: 94% (11 Jan 2023 08:21) (94% - 97%)    Parameters below as of 11 Jan 2023 08:21  Patient On (Oxygen Delivery Method): room air        Physical Exam:   · Constitutional Comments	alert +dysarthric +expressive difficulties and word finding errors No dyspnea on conversation but some dysphonia  · Respiratory	no respiratory distress  · Respiratory Comments	lungs clear  · Cardiovascular	regular rate and rhythm; S1 S2 present; no gallops; no rub; no murmur  · Gastrointestinal	soft; nontender; nondistended; normal active bowel sounds  · Motor	left LE 1+ edema pretibial but compressible  no erythema or warmth no TTP  right LE 1+ edema    Continue comprehensive acute rehab program consisting of 3hrs/day of OT/PT.

## 2023-01-11 NOTE — DISCHARGE NOTE PROVIDER - NSDCFUADDAPPT_GEN_ALL_CORE_FT
Follow up with your primary medical doctor in one week to review your current medications  Follow up with your cardiologist in 1-2 weeks

## 2023-01-11 NOTE — DISCHARGE NOTE PROVIDER - NSDCMRMEDTOKEN_GEN_ALL_CORE_FT
albuterol 90 mcg/inh inhalation aerosol: 2 puff(s) inhaled every 6 hours  allopurinol 100 mg oral tablet: 1 tab(s) orally once a day  apixaban 5 mg oral tablet: 1 tab(s) orally 2 times a day  aspirin 81 mg oral delayed release tablet: 1 tab(s) orally once a day  benzonatate 100 mg oral capsule: 2 cap(s) orally 3 times a day  bisacodyl 5 mg oral delayed release tablet: 1 tab(s) orally every 12 hours, As needed, Constipation  budesonide-formoterol 160 mcg-4.5 mcg/inh inhalation aerosol: 2 puff(s) inhaled 2 times a day  fluticasone 50 mcg/inh nasal spray: 1 spray(s) nasal 2 times a day  furosemide 40 mg oral tablet: 1 tab(s) orally 2 times a day  guaiFENesin 600 mg oral tablet, extended release: 1 tab(s) orally every 12 hours  hydrALAZINE 25 mg oral tablet: 1 tab(s) orally every 8 hours  metoprolol succinate 50 mg oral tablet, extended release: 1 tab(s) orally once a day  senna leaf extract oral tablet: 2 tab(s) orally once a day (at bedtime)  tiotropium 2.5 mcg/inh inhalation aerosol: 2 puff(s) inhaled once a day   albuterol 90 mcg/inh inhalation aerosol: 2 puff(s) inhaled every 6 hours  allopurinol 100 mg oral tablet: 1 tab(s) orally once a day  apixaban 5 mg oral tablet: 1 tab(s) orally 2 times a day  aspirin 81 mg oral delayed release tablet: 1 tab(s) orally once a day  budesonide-formoterol 160 mcg-4.5 mcg/inh inhalation aerosol: 2 puff(s) inhaled 2 times a day  furosemide 40 mg oral tablet: 1 tab(s) orally 2 times a day  hydrALAZINE 25 mg oral tablet: 1 tab(s) orally every 8 hours  metoprolol succinate 50 mg oral tablet, extended release: 1 tab(s) orally once a day  Multiple Vitamins oral tablet: 1 tab(s) orally once a day  pantoprazole 40 mg oral delayed release tablet: 1 tab(s) orally once a day (before a meal)  tiotropium 2.5 mcg/inh inhalation aerosol: 2 puff(s) inhaled once a day

## 2023-01-11 NOTE — DISCHARGE NOTE PROVIDER - HOSPITAL COURSE
Patient is an 88 year old male with a PMH of CVA, CHF, HTN, afib on eliquis, hip fx s/p fall and prior intubation for HF exacerbation presenting to [facility] ED for SOB and CP admitted to ICU at [facility] on 12/17/22 for acute hypoxic respiratory failure, flash pulmonary edema and acute CHF exacerbation. Also found to be Flu A positive, resulting in multifocal PNA. Pt was on BIPAP, weaned down to NC 4L. Tolerated well. Troponins were mildly elevatd, but stablized, likely demand ischemia. Pt initially treated w/ tamiflu for influenza A, later developed multifocal pneunonia, neg for MRSA and legionella. ID recommended Cefepime, which patient finished last dose of on 1/3/23. Patient downgraded to medicine on 12/21/22. Seen by PM&R, PT, OT who recommended acute rehab. Patient admitted to [facility] for acute rehab on 1/3/23.    REHAB COURSE:  Patient was stable upon rehab admission to  Inpatient Rehabilitation Facility. Admitted with gait instability, ADL, and functional impairments.     Rehab Course was significant for IRINEO; home ARB was held and SCr improved. Patient will need to follow with renal for reinstatement of his home ARB as outpatient.   Stage 2 pressure ulcer of the right buttock was treated with cavilon to periwound, and alyvene foam dressing.  Noted to have bilateral LE swelling and discomfort. Duplex negative for DVT 1/6. Edema improved with elevation and ACE wrapping of legs.    Patient tolerated course of inpatient PT/OT/SLP rehab with significant functional improvements and met rehab goals prior to discharge. Patient was medically stable and optimized for discharge ____ home with follow-up with 1/12/23. Patient is an 88 year old male with a PMH of CVA, CHF, HTN, afib on eliquis, hip fx s/p fall and prior intubation for HF exacerbation presenting to [facility] ED for SOB and CP admitted to ICU at [facility] on 12/17/22 for acute hypoxic respiratory failure, flash pulmonary edema and acute CHF exacerbation. Also found to be Flu A positive, resulting in multifocal PNA. Pt was on BIPAP, weaned down to NC 4L. Tolerated well. Troponins were mildly elevatd, but stablized, likely demand ischemia. Pt initially treated w/ tamiflu for influenza A, later developed multifocal pneunonia, neg for MRSA and legionella. ID recommended Cefepime, which patient finished last dose of on 1/3/23. Patient downgraded to medicine on 12/21/22. Seen by PM&R, PT, OT who recommended acute rehab. Patient admitted to [facility] for acute rehab on 1/3/23.    REHAB COURSE:  Patient was stable upon rehab admission to  Inpatient Rehabilitation Facility. Admitted with gait instability, ADL, and functional impairments.     Rehab Course was significant for IRINEO; home ARB was held and SCr improved. Patient will need to follow with renal for reinstatement of his home ARB as outpatient.   Stage 2 pressure ulcer of the right buttock was treated with cavilon to periwound, and alyvene foam dressing.  Noted to have bilateral LE swelling and discomfort. Duplex negative for DVT 1/6. Edema improved with elevation and ACE wrapping of legs.    Patient tolerated course of inpatient PT/OT/SLP rehab with significant functional improvements and met rehab goals prior to discharge. Patient was medically stable and optimized for discharge home with follow-up with Pulmonology and Cardiology.

## 2023-01-11 NOTE — DISCHARGE NOTE PROVIDER - CARE PROVIDER_API CALL
Chapo Hernandez ()  Critical Care Medicine; Internal Medicine; Pulmonary Disease  [address], [address]  [place] [address]  Phone: [phone]  Fax: [phone]  Follow Up Time:     Fred Reinoso)  PhysicalRehab Medicine  [address] [place] [address]  Phone: [phone]  Fax: [phone]  Follow Up Time:

## 2023-01-11 NOTE — PROGRESS NOTE ADULT - SUBJECTIVE AND OBJECTIVE BOX
No distress, comfortable on RA    Vital Signs Last 24 Hrs  T(C): 36.6 (01-11-23 @ 22:34), Max: 36.6 (01-11-23 @ 07:21)  T(F): 97.8 (01-11-23 @ 22:34), Max: 97.8 (01-11-23 @ 07:21)  HR: 69 (01-11-23 @ 22:34) (63 - 72)  BP: 142/68 (01-11-23 @ 22:34) (142/68 - 150/80)  RR: 16 (01-11-23 @ 22:34) (16 - 18)  SpO2: 97% (01-11-23 @ 22:34) (94% - 97%)    s1s2  b/l air entry  soft, ND  + edema, improving, stasis changes                                                                                      10 Blair 2023 05:40    140    |  101    |  41     ----------------------------<  101    3.9     |  30     |  1.53     Ca    8.4        10 Blair 2023 05:40    acetaminophen     Tablet .. 650 milliGRAM(s) Oral every 6 hours PRN  albuterol    90 MICROgram(s) HFA Inhaler 2 Puff(s) Inhalation every 6 hours PRN  allopurinol 100 milliGRAM(s) Oral daily  apixaban 2.5 milliGRAM(s) Oral every 12 hours  aspirin enteric coated 81 milliGRAM(s) Oral daily  benzonatate 200 milliGRAM(s) Oral three times a day  bisacodyl 5 milliGRAM(s) Oral every 12 hours PRN  budesonide 160 MICROgram(s)/formoterol 4.5 MICROgram(s) Inhaler 2 Puff(s) Inhalation two times a day  furosemide    Tablet 40 milliGRAM(s) Oral two times a day  hemorrhoidal Ointment 1 Application(s) Rectal two times a day PRN  hydrALAZINE 25 milliGRAM(s) Oral every 8 hours  methyl salicylate 15%/menthol 10% Topical Cream 1 Application(s) Topical two times a day PRN  metoprolol succinate ER 50 milliGRAM(s) Oral daily  multivitamin 1 Tablet(s) Oral daily  pantoprazole    Tablet 40 milliGRAM(s) Oral before breakfast  senna 2 Tablet(s) Oral at bedtime  sodium chloride 0.65% Nasal 1 Spray(s) Both Nostrils every 6 hours PRN  tiotropium 2.5 MICROgram(s) Inhaler 2 Puff(s) Inhalation daily    A/P:    S/p Flu A, b/l PNA  CM, mod MR, TR, EF 45-50%  CKD 3 fairly stable  UA bland  Renal SONO w/o hydro  Edema improving  Continue Lasix 40mg PO BID  Avoid nephrotoxins  F/u [facility]     [phone]

## 2023-01-12 ENCOUNTER — TRANSCRIPTION ENCOUNTER (OUTPATIENT)
Age: 88
End: 2023-01-12

## 2023-01-12 VITALS — OXYGEN SATURATION: 96 %

## 2023-01-12 LAB
ALBUMIN SERPL ELPH-MCNC: 2.7 G/DL — LOW (ref 3.3–5)
ALP SERPL-CCNC: 57 U/L — SIGNIFICANT CHANGE UP (ref 40–120)
ALT FLD-CCNC: 17 U/L — SIGNIFICANT CHANGE UP (ref 10–45)
ANION GAP SERPL CALC-SCNC: 10 MMOL/L — SIGNIFICANT CHANGE UP (ref 5–17)
AST SERPL-CCNC: 24 U/L — SIGNIFICANT CHANGE UP (ref 10–40)
BASOPHILS # BLD AUTO: 0.02 K/UL — SIGNIFICANT CHANGE UP (ref 0–0.2)
BASOPHILS NFR BLD AUTO: 0.3 % — SIGNIFICANT CHANGE UP (ref 0–2)
BILIRUB SERPL-MCNC: 0.5 MG/DL — SIGNIFICANT CHANGE UP (ref 0.2–1.2)
BUN SERPL-MCNC: 45 MG/DL — HIGH (ref 7–23)
CALCIUM SERPL-MCNC: 8.3 MG/DL — LOW (ref 8.4–10.5)
CHLORIDE SERPL-SCNC: 101 MMOL/L — SIGNIFICANT CHANGE UP (ref 96–108)
CO2 SERPL-SCNC: 30 MMOL/L — SIGNIFICANT CHANGE UP (ref 22–31)
CREAT SERPL-MCNC: 1.59 MG/DL — HIGH (ref 0.5–1.3)
EGFR: 42 ML/MIN/1.73M2 — LOW
EOSINOPHIL # BLD AUTO: 0.17 K/UL — SIGNIFICANT CHANGE UP (ref 0–0.5)
EOSINOPHIL NFR BLD AUTO: 2.3 % — SIGNIFICANT CHANGE UP (ref 0–6)
GLUCOSE SERPL-MCNC: 101 MG/DL — HIGH (ref 70–99)
HCT VFR BLD CALC: 30.8 % — LOW (ref 39–50)
HGB BLD-MCNC: 10.2 G/DL — LOW (ref 13–17)
IMM GRANULOCYTES NFR BLD AUTO: 0.8 % — SIGNIFICANT CHANGE UP (ref 0–0.9)
LYMPHOCYTES # BLD AUTO: 1.53 K/UL — SIGNIFICANT CHANGE UP (ref 1–3.3)
LYMPHOCYTES # BLD AUTO: 20.3 % — SIGNIFICANT CHANGE UP (ref 13–44)
MCHC RBC-ENTMCNC: 32.2 PG — SIGNIFICANT CHANGE UP (ref 27–34)
MCHC RBC-ENTMCNC: 33.1 GM/DL — SIGNIFICANT CHANGE UP (ref 32–36)
MCV RBC AUTO: 97.2 FL — SIGNIFICANT CHANGE UP (ref 80–100)
MONOCYTES # BLD AUTO: 1.16 K/UL — HIGH (ref 0–0.9)
MONOCYTES NFR BLD AUTO: 15.4 % — HIGH (ref 2–14)
NEUTROPHILS # BLD AUTO: 4.6 K/UL — SIGNIFICANT CHANGE UP (ref 1.8–7.4)
NEUTROPHILS NFR BLD AUTO: 60.9 % — SIGNIFICANT CHANGE UP (ref 43–77)
NRBC # BLD: 0 /100 WBCS — SIGNIFICANT CHANGE UP (ref 0–0)
PLATELET # BLD AUTO: 186 K/UL — SIGNIFICANT CHANGE UP (ref 150–400)
POTASSIUM SERPL-MCNC: 3.9 MMOL/L — SIGNIFICANT CHANGE UP (ref 3.5–5.3)
POTASSIUM SERPL-SCNC: 3.9 MMOL/L — SIGNIFICANT CHANGE UP (ref 3.5–5.3)
PROT SERPL-MCNC: 7.3 G/DL — SIGNIFICANT CHANGE UP (ref 6–8.3)
RBC # BLD: 3.17 M/UL — LOW (ref 4.2–5.8)
RBC # FLD: 14.4 % — SIGNIFICANT CHANGE UP (ref 10.3–14.5)
SODIUM SERPL-SCNC: 141 MMOL/L — SIGNIFICANT CHANGE UP (ref 135–145)
WBC # BLD: 7.54 K/UL — SIGNIFICANT CHANGE UP (ref 3.8–10.5)
WBC # FLD AUTO: 7.54 K/UL — SIGNIFICANT CHANGE UP (ref 3.8–10.5)

## 2023-01-12 PROCEDURE — 99238 HOSP IP/OBS DSCHRG MGMT 30/<: CPT | Mod: GC

## 2023-01-12 RX ORDER — TIOTROPIUM BROMIDE 18 UG/1
2 CAPSULE ORAL; RESPIRATORY (INHALATION)
Qty: 1 | Refills: 0
Start: 2023-01-12 | End: 2023-02-10

## 2023-01-12 RX ORDER — HYDRALAZINE HCL 50 MG
1 TABLET ORAL
Qty: 90 | Refills: 0
Start: 2023-01-12 | End: 2023-02-10

## 2023-01-12 RX ORDER — ALBUTEROL 90 UG/1
2 AEROSOL, METERED ORAL
Qty: 1 | Refills: 0
Start: 2023-01-12 | End: 2023-02-10

## 2023-01-12 RX ORDER — ALLOPURINOL 300 MG
1 TABLET ORAL
Qty: 30 | Refills: 0
Start: 2023-01-12 | End: 2023-02-10

## 2023-01-12 RX ORDER — BUDESONIDE AND FORMOTEROL FUMARATE DIHYDRATE 160; 4.5 UG/1; UG/1
2 AEROSOL RESPIRATORY (INHALATION)
Qty: 1 | Refills: 0
Start: 2023-01-12

## 2023-01-12 RX ORDER — PANTOPRAZOLE SODIUM 20 MG/1
1 TABLET, DELAYED RELEASE ORAL
Qty: 30 | Refills: 0
Start: 2023-01-12 | End: 2023-02-10

## 2023-01-12 RX ORDER — APIXABAN 2.5 MG/1
1 TABLET, FILM COATED ORAL
Qty: 60 | Refills: 0
Start: 2023-01-12 | End: 2023-02-10

## 2023-01-12 RX ORDER — METOPROLOL TARTRATE 50 MG
1 TABLET ORAL
Qty: 30 | Refills: 0
Start: 2023-01-12 | End: 2023-02-10

## 2023-01-12 RX ORDER — FUROSEMIDE 40 MG
1 TABLET ORAL
Qty: 60 | Refills: 0
Start: 2023-01-12 | End: 2023-02-10

## 2023-01-12 RX ADMIN — Medication 40 MILLIGRAM(S): at 13:42

## 2023-01-12 RX ADMIN — PANTOPRAZOLE SODIUM 40 MILLIGRAM(S): 20 TABLET, DELAYED RELEASE ORAL at 05:36

## 2023-01-12 RX ADMIN — Medication 81 MILLIGRAM(S): at 11:52

## 2023-01-12 RX ADMIN — APIXABAN 2.5 MILLIGRAM(S): 2.5 TABLET, FILM COATED ORAL at 05:36

## 2023-01-12 RX ADMIN — Medication 1 TABLET(S): at 11:52

## 2023-01-12 RX ADMIN — Medication 50 MILLIGRAM(S): at 05:36

## 2023-01-12 RX ADMIN — Medication 25 MILLIGRAM(S): at 05:36

## 2023-01-12 RX ADMIN — Medication 25 MILLIGRAM(S): at 13:42

## 2023-01-12 RX ADMIN — Medication 100 MILLIGRAM(S): at 11:52

## 2023-01-12 RX ADMIN — Medication 40 MILLIGRAM(S): at 05:36

## 2023-01-12 NOTE — DISCHARGE NOTE NURSING/CASE MANAGEMENT/SOCIAL WORK - NSDCPEEMAIL_GEN_ALL_CORE
[facility] for Tobacco Control email [email or web address]

## 2023-01-12 NOTE — DISCHARGE NOTE NURSING/CASE MANAGEMENT/SOCIAL WORK - NSDCPEFALRISK_GEN_ALL_CORE
For information on Fall & Injury Prevention, visit: [email or web address] OR  [email or web address] OR  https://www.cdc.gov/steadi/patient.html

## 2023-01-12 NOTE — PROGRESS NOTE ADULT - NUTRITIONAL ASSESSMENT
This patient has been assessed with a concern for Malnutrition and has been determined to have a diagnosis/diagnoses of Severe protein-calorie malnutrition.    This patient is being managed with:   Diet Soft and Bite Sized-  Low Sodium  Supplement Feeding Modality:  Oral  Ensure Enlive Cans or Servings Per Day:  1       Frequency:  Daily  Entered: Jan 4 2023  1:47PM    

## 2023-01-12 NOTE — PROGRESS NOTE ADULT - REASON FOR ADMISSION
respiratory failure,  Debility

## 2023-01-12 NOTE — PROGRESS NOTE ADULT - ASSESSMENT
88 year old male with a PMH of CVA, CHF, HTN, afib on eliquis, hip fx s/p fall presented to [facility] on 12/17/22 with acute hypoxic respiratory failure secondary to flash pulmonary edema and acute CHF exacerbation. Also found to be Flu A positive, resulting in multifocal PNA. s/p tamiflu and cefepime.     # Debility secondary to acute hypoxic respiratory failure  - multifocal PNA, influenza A  - CXR 12/31 +infiltrates, improved, afebrile, tolerating therapy  - s/p steroid taper. Cefepime completed  - c/w symbicort, nasal spray, albuterol inhaler and tiotropium  - continue chest PT PRN  - Continue comprehensive Rehab Program of PT/OT - 3 hours a day, 5 days a week  - Precautions: cardiac, fall, respiratory,. AC  DC to home with Vn services  FU PMD 1 week to review meds  FU Pulmonary- Dr Bailey 1-2 weeks  FU PMR Dr Reinoso 4-6 weeks    # Pulmonary Edema 2/2 acute on chronic CHF  - hold ARB due to poor renal function  - on lasix BID dosing per renal. Now edema better  - c/w hydralazine and metoprolol  - monitor daily I/Os and weight  Follow with PMD ? resume ARB as OPD    # IRINEO on CKD3  - hold ARB, can consider restarting on discharge from rehab as IRINEO resolves  - avoid nephrotoxins,  renally adjusted meds  - renal following: Cont lasix for now as daily.  -BMP -Cr better 1.59 1/12-     # chronic Afib  - c/w eliquis 2.5mg bid, GI ppx added-Protonix ok per renal  -guaiac negative,   PATIENT HAS ELIQUIS AT HOME- STATES HE GETS SAMPLES FROM HIS CARDIOLOGIST- A PRESCRIPTION WAS   SENT TO [facility]  ANYWAY    anemia  ? chronic disease  guiac negative   Hgb better today- 10.2    # Sleep  - melatonin PRN     #  stage 2 right buttock pressure ulcer.  - Cavilon to periwound, and alyvene foam dressing BID or PRN if soiled/removed- wound improved    # Pain Mgmt   - Tylenol PRN    # GI/Bowel Mgmt   - Continent c/w Senna  - Preparation H for hemorrhoids    # FEN   - Diet - minced and moist + Thins  [CCHO, DASH/TLC]      # DVT PPX:  - doppler 12/25 , 1/6 negative DVT  - eliquis   - Increased LE swelling and discomfort 1/6. continue ACE wrapping, elevation- edema resolved

## 2023-01-12 NOTE — DISCHARGE NOTE NURSING/CASE MANAGEMENT/SOCIAL WORK - NSSCTYPOFSERV_GEN_ALL_CORE
Home visit physical therapy, occupational therapy, skilled nursing, aide evaluation, social work. Note: If you do not hear from [facility] within 24-48 hours after discharge, please contact them at

## 2023-01-12 NOTE — DISCHARGE NOTE NURSING/CASE MANAGEMENT/SOCIAL WORK - PATIENT PORTAL LINK FT
You can access the FollowMyHealth Patient Portal offered by [facility] by registering at the following website: [email or web address]. By joining [employer]’s FollowMyHealth portal, you will also be able to view your health information using other applications (apps) compatible with our system.

## 2023-01-12 NOTE — PROGRESS NOTE ADULT - SUBJECTIVE AND OBJECTIVE BOX
HPI:  88 year old male with a PMH of CVA, CHF, HTN, afib on eliquis, hip fx s/p fall and prior intubation for HF exacerbation presenting to [facility] ED for SOB and CP admitted to ICU at [facility] on 12/17/22 for acute hypoxic respiratory failure, flash pulmonary edema and acute CHF exacerbation. Also found to be Flu A positive, resulting in multifocal PNA. Pt was on BIPAP, weaned down to NC 4L. Tolerated well. Troponins were mildly elevatd, but stablized, likely demand ischemia. Pt initially treated w/ tamiflu for influenza A, later developed multifocal pneunonia, neg for MRSA and legionella. ID recommended Cefepime, which patient finished last dose of on 1/3/23. Patient downgraded to medicine on 12/21/22. Seen by PM&R, PT, OT who recommended acute rehab. Patient admitted to [facility] for acute rehab on 1/3/23.         ROS/Subjective 	Patient seen bedside  ready for DC. Night uneventful, no respiratory distress. No fever, denies any sob. Less cough reported. No chest pain, no NV. last BM 1/10. LEs edema improved. Pain in LEs resolved  Anemia-guiac negative   no dizziness, no headaches  no nausea, no vomiting  no SOB, no chest pain          MEDICATIONS  (STANDING):  allopurinol 100 milliGRAM(s) Oral daily  apixaban 2.5 milliGRAM(s) Oral every 12 hours  aspirin enteric coated 81 milliGRAM(s) Oral daily  benzonatate 200 milliGRAM(s) Oral three times a day  budesonide 160 MICROgram(s)/formoterol 4.5 MICROgram(s) Inhaler 2 Puff(s) Inhalation two times a day  furosemide    Tablet 40 milliGRAM(s) Oral two times a day  hydrALAZINE 25 milliGRAM(s) Oral every 8 hours  metoprolol succinate ER 50 milliGRAM(s) Oral daily  multivitamin 1 Tablet(s) Oral daily  pantoprazole    Tablet 40 milliGRAM(s) Oral before breakfast  senna 2 Tablet(s) Oral at bedtime  tiotropium 2.5 MICROgram(s) Inhaler 2 Puff(s) Inhalation daily    MEDICATIONS  (PRN):  acetaminophen     Tablet .. 650 milliGRAM(s) Oral every 6 hours PRN Temp greater or equal to 38C (100.4F), Mild Pain (1 - 3), Moderate Pain (4 - 6)  albuterol    90 MICROgram(s) HFA Inhaler 2 Puff(s) Inhalation every 6 hours PRN Shortness of Breath  bisacodyl 5 milliGRAM(s) Oral every 12 hours PRN Constipation  hemorrhoidal Ointment 1 Application(s) Rectal two times a day PRN rectal itching/ pain  methyl salicylate 15%/menthol 10% Topical Cream 1 Application(s) Topical two times a day PRN pain  sodium chloride 0.65% Nasal 1 Spray(s) Both Nostrils every 6 hours PRN Nasal Congestion                            10.2   7.54  )-----------( 186      ( 12 Jan 2023 06:00 )             30.8     01-12    141  |  101  |  45<H>  ----------------------------<  101<H>  3.9   |  30  |  1.59<H>    Ca    8.3<L>      12 Jan 2023 06:00    TPro  7.3  /  Alb  2.7<L>  /  TBili  0.5  /  DBili  x   /  AST  24  /  ALT  17  /  AlkPhos  57  01-12        CAPILLARY BLOOD GLUCOSE          Vital Signs Last 24 Hrs  T(C): 36.7 (12 Jan 2023 08:03), Max: 36.7 (12 Jan 2023 08:03)  T(F): 98 (12 Jan 2023 08:03), Max: 98 (12 Jan 2023 08:03)  HR: 64 (12 Jan 2023 08:08) (64 - 72)  BP: 160/76 (12 Jan 2023 08:03) (142/68 - 160/76)  BP(mean): --  RR: 16 (12 Jan 2023 08:03) (16 - 16)  SpO2: 96% (12 Jan 2023 08:08) (95% - 97%)    Parameters below as of 12 Jan 2023 08:08  Patient On (Oxygen Delivery Method): room air        Physical Exam:   · Constitutional Comments	alert +dysarthric +expressive difficulties and word finding errors No dyspnea on conversation but some dysphonia  · Respiratory	no respiratory distress  · Respiratory Comments	lungs clear  · Cardiovascular	regular rate and rhythm; S1 S2 present; no gallops; no rub; no murmur  · Gastrointestinal	soft; nontender; nondistended; normal active bowel sounds  · Motor	left LE 1+ edema pretibial but compressible  no erythema or warmth no TTP  right LE 1+ edema    Continue comprehensive acute rehab program consisting of 3hrs/day of OT/PT.

## 2023-01-12 NOTE — DISCHARGE NOTE NURSING/CASE MANAGEMENT/SOCIAL WORK - NSDCPEWEB_GEN_ALL_CORE
[facility] website --- [email or web address] website --- [email or web address]

## 2023-01-17 ENCOUNTER — TRANSCRIPTION ENCOUNTER (OUTPATIENT)
Age: 88
End: 2023-01-17

## 2023-01-24 ENCOUNTER — NON-APPOINTMENT (OUTPATIENT)
Age: 88
End: 2023-01-24

## 2023-01-24 ENCOUNTER — TRANSCRIPTION ENCOUNTER (OUTPATIENT)
Age: 88
End: 2023-01-24

## 2023-01-31 ENCOUNTER — TRANSCRIPTION ENCOUNTER (OUTPATIENT)
Age: 88
End: 2023-01-31

## 2023-02-10 ENCOUNTER — APPOINTMENT (OUTPATIENT)
Dept: PULMONOLOGY | Facility: CLINIC | Age: 88
End: 2023-02-10

## 2023-02-10 PROCEDURE — 85025 COMPLETE CBC W/AUTO DIFF WBC: CPT

## 2023-02-10 PROCEDURE — 93970 EXTREMITY STUDY: CPT

## 2023-02-10 PROCEDURE — 97530 THERAPEUTIC ACTIVITIES: CPT

## 2023-02-10 PROCEDURE — 97110 THERAPEUTIC EXERCISES: CPT

## 2023-02-10 PROCEDURE — 94640 AIRWAY INHALATION TREATMENT: CPT

## 2023-02-10 PROCEDURE — 97535 SELF CARE MNGMENT TRAINING: CPT

## 2023-02-10 PROCEDURE — 97163 PT EVAL HIGH COMPLEX 45 MIN: CPT

## 2023-02-10 PROCEDURE — 82272 OCCULT BLD FECES 1-3 TESTS: CPT

## 2023-02-10 PROCEDURE — 97112 NEUROMUSCULAR REEDUCATION: CPT

## 2023-02-10 PROCEDURE — 97167 OT EVAL HIGH COMPLEX 60 MIN: CPT

## 2023-02-10 PROCEDURE — 97116 GAIT TRAINING THERAPY: CPT

## 2023-02-10 PROCEDURE — 80053 COMPREHEN METABOLIC PANEL: CPT

## 2023-02-10 PROCEDURE — 80048 BASIC METABOLIC PNL TOTAL CA: CPT

## 2023-02-10 PROCEDURE — 36415 COLL VENOUS BLD VENIPUNCTURE: CPT

## 2023-02-15 ENCOUNTER — NON-APPOINTMENT (OUTPATIENT)
Age: 88
End: 2023-02-15

## 2023-02-15 ENCOUNTER — APPOINTMENT (OUTPATIENT)
Dept: PHYSICAL MEDICINE AND REHAB | Facility: CLINIC | Age: 88
End: 2023-02-15
Payer: MEDICARE

## 2023-02-15 VITALS
TEMPERATURE: 98.2 F | DIASTOLIC BLOOD PRESSURE: 65 MMHG | OXYGEN SATURATION: 98 % | HEART RATE: 70 BPM | WEIGHT: 162 LBS | SYSTOLIC BLOOD PRESSURE: 159 MMHG

## 2023-02-15 DIAGNOSIS — R53.81 OTHER MALAISE: ICD-10-CM

## 2023-02-15 DIAGNOSIS — R21 RASH AND OTHER NONSPECIFIC SKIN ERUPTION: ICD-10-CM

## 2023-02-15 DIAGNOSIS — R60.0 LOCALIZED EDEMA: ICD-10-CM

## 2023-02-15 PROCEDURE — 99212 OFFICE O/P EST SF 10 MIN: CPT

## 2023-02-15 NOTE — ASSESSMENT
[FreeTextEntry1] : 89 yo male Sp Acute resp faliure /CHF- on Rehab 2nd Debility - doing well\par Noted rash On LES- Informed Significant other to arrange eval with Dermatology to address this\par Patient also instructed to see his cardiologist Dr Rubio for FU and evaluation of his Meds\par FU with Me as needed

## 2023-02-15 NOTE — REVIEW OF SYSTEMS
[Lower Ext Edema] : lower extremity edema [Shortness Of Breath] : shortness of breath [Skin Rash] : skin rash [Negative] : Heme/Lymph [FreeTextEntry6] : occ SOB

## 2023-02-15 NOTE — SOCIAL HISTORY
[Private Home] : in a private home [Stairs to enter?] : no stairs to enter [Stairs inside the home?] : no stairs inside the home [Walker] : walker [FreeTextEntry1] : Stays on first floor [FreeTextEntry6] : Family home assists with care- has significant other as well

## 2023-02-15 NOTE — PHYSICAL EXAM
[FreeTextEntry1] : General: NAD, Resting Comfortable,                                \par HEENT: NC/AT, EOM I,\par Cardio:irreg irreg, Normal S1-S2, No M/G/R                            \par Pulm: No Respiratory Distress,  Lungs CTAB                      \par Abdomen: ND/NT, Soft, BS+                                              \par MSK: No joint swelling, Full ROM                                       \par Ext: 1-2+ edema LES, No calf tenderness \par diffuse rash BLEs below knee with Few Bullae/+ papules- excoriations 2nd scratching\par \par Neurological Examination  \par Cognitive: AAO x 2                                                                   \par CN II - XII  intact                                                                     \par Sensory: Intact to light touch                                                                                      \par Tone: normal Throughout \par Balance: impaired\par \par Motor  \par LEFT    UE: SF [5/5], EF [5/5], EE [5/5], WE [5/5],  [wnl]\par RIGHT UE: SF [5/5], EF [5/5], EE [5/5], WE [5/5],  [wnl]\par LEFT    LE:  HF [5/5], KE [5/5], DF [5/5], EHL [5/5],  PF [5/5]\par RIGHT LE:  HF [5/5], KE [5/5], DF [5/5], EHL [5/5],  PF [5/5]  \par \par Reflex:  intact. \par \par transfers and ambulates independently with RW

## 2023-02-15 NOTE — HISTORY OF PRESENT ILLNESS
[FreeTextEntry1] : Patient is an 88 year old male with a PMH of CVA, CHF, HTN, afib on eliquis, hip fx s/p fall and prior intubation for HF exacerbation presenting to [facility] ED for SOB and CP admitted to ICU at [facility] on 12/17/22 for acute hypoxic respiratory failure, flash pulmonary edema and acute CHF exacerbation. Also found to be Flu A positive, resulting in multifocal PNA. Pt was on BIPAP, weaned down to NC 4L. Tolerated well. Troponins were mildly elevatd, but stablized, likely demand ischemia. Pt initially treated w/ tamiflu for influenza A, later developed multifocal pneunonia, neg for MRSA and legionella. ID recommended Cefepime, which patient finished last dose of on 1/3/23. Patient downgraded to medicine on 12/21/22. Seen by PM&R, PT, OT who recommended acute rehab. Patient admitted to [facility] for acute rehab on 1/3/23.\par \par REHAB COURSE:\par Patient was stable upon rehab admission to  [facility]. Admitted with gait instability, ADL, and functional impairments. \par \par Rehab Course was significant for IRINEO; home ARB was held and SCr improved. Patient will need to follow with renal for reinstatement of his home ARB as outpatient. \par Stage 2 pressure ulcer of the right buttock was treated with cavilon to periwound, and alyvene foam dressing.\par Noted to have bilateral LE swelling and discomfort. Duplex negative for DVT 1/6. Edema improved with elevation and ACE wrapping of legs.\par \par Patient tolerated course of inpatient PT/OT/SLP rehab with significant functional improvements and met rehab goals prior to discharge. Patient was medically stable and optimized for discharge home with follow-up with Pulmonology and Cardiology. \par \par Patient seen on Follow up\par patient has not seen his cardiologist since DC- has been receiving VN services at home- He reports his meds have all been refilled by Dr Duval- he states he has enough Eliquis at home\par He complains about itching in both LEs- He has history of rash for > one year that comes and goes- has not seen dermatologist for it

## 2023-03-06 NOTE — PROGRESS NOTE ADULT - SUBJECTIVE AND OBJECTIVE BOX
Labs addressed at 1/13/23 pcp visit.    Patient is a 88y old  Male who presents with a chief complaint of shortness of breath (29 Dec 2022 13:23)      Patient seen and examined at bedside. No events overnight. Patient sitting in chair and on 2L NC. He states he does not like nebulizer treatments as the smoke bothers his eyes and has been refusing the treatment. Patient continues to have productive cough but states it is better, feels lousy but better than admission. Admits to SOB but improving, denies chest pain, abd pain.    ALLERGIES:  No Known Allergies    MEDICATIONS  (STANDING):  allopurinol 100 milliGRAM(s) Oral daily  apixaban 2.5 milliGRAM(s) Oral every 12 hours  aspirin enteric coated 81 milliGRAM(s) Oral daily  benzonatate 200 milliGRAM(s) Oral three times a day  budesonide 160 MICROgram(s)/formoterol 4.5 MICROgram(s) Inhaler 2 Puff(s) Inhalation two times a day  cefepime   IVPB      cefepime   IVPB 1000 milliGRAM(s) IV Intermittent every 12 hours  fluticasone propionate 50 MICROgram(s)/spray Nasal Spray 1 Spray(s) Both Nostrils two times a day  furosemide    Tablet 40 milliGRAM(s) Oral daily  guaiFENesin  milliGRAM(s) Oral every 12 hours  hydrALAZINE 25 milliGRAM(s) Oral every 8 hours  hydrocodone/homatropine Syrup 5 milliLiter(s) Oral at bedtime  metoprolol succinate ER 50 milliGRAM(s) Oral daily  predniSONE   Tablet 10 milliGRAM(s) Oral daily  senna 2 Tablet(s) Oral at bedtime  sodium chloride 0.65% Nasal 1 Spray(s) Both Nostrils every 6 hours    MEDICATIONS  (PRN):    Vital Signs Last 24 Hrs  T(F): 97.8 (29 Dec 2022 05:25), Max: 97.8 (29 Dec 2022 05:25)  HR: 80 (29 Dec 2022 05:25) (76 - 87)  BP: 126/76 (29 Dec 2022 05:25) (126/59 - 134/61)  RR: 16 (29 Dec 2022 05:25) (16 - 18)  SpO2: 98% (29 Dec 2022 05:25) (92% - 98%)  I&O's Summary    28 Dec 2022 07:01  -  29 Dec 2022 07:00  --------------------------------------------------------  IN: 780 mL / OUT: 850 mL / NET: -70 mL        PHYSICAL EXAM:  GENERAL: NAD, sitting in chair  HEAD:  Atraumatic, Normocephalic  EYES: conjunctiva and sclera clear  ENMT: Moist mucous membranes, Good dentition, no thrush  CHEST/LUNG: diminished throughout, wheezing auscultated with rhonchi throughout   HEART: RRR; S1/S2, No murmur  ABDOMEN: Soft, Nontender, Nondistended; Bowel sounds present  EXTREMITIES: No calf tenderness, No cyanosis, 1+ pitting edema b/l LE  SKIN: Warm, perfused  PSYCH: Normal mood, Normal affect    LABS:                        11.9   17.87 )-----------( 296      ( 29 Dec 2022 07:45 )             36.4     12-29    140  |  102  |  66  ----------------------------<  133  3.8   |  30  |  1.53    Ca    9.1      29 Dec 2022 07:45  Phos  4.1     12-28  Mg     2.9     12-28                                      Culture - Blood (collected 27 Dec 2022 11:45)  Source: .Blood Blood-Peripheral  Preliminary Report (28 Dec 2022 15:09):    No growth to date.    Culture - Blood (collected 27 Dec 2022 11:40)  Source: .Blood Blood-Peripheral  Preliminary Report (28 Dec 2022 15:09):    No growth to date.          RADIOLOGY & ADDITIONAL TESTS:    Care Discussed with Consultants/Other Providers:

## 2023-04-07 NOTE — DISCHARGE NOTE PROVIDER - CARE PROVIDER_API CALL
Dewayne Rubio (MD)  Cardiovascular Disease; Internal Medicine  [address] [address]  Phone: [phone]  Fax: [phone]  Follow Up Time:     Charlie Duval ()  Internal Medicine  [address] [place] [address]  Phone: [phone]  Fax: [phone]  Follow Up Time: no

## 2023-07-19 NOTE — DIETITIAN INITIAL EVALUATION ADULT - CALCULATED TO (CAL/KG)
-- DO NOT REPLY / DO NOT REPLY ALL --  -- Message is from Engagement Center Operations (ECO) --    Offered Waitlist if Available for the Visit Type? No    Caller is requesting an appointment - at a sooner time than what was available.      Caller wants sooner appointment - offered other approved options    Reason for Visit: follow up from visit in January regarding kidney. Kidney numbers are currently at 20 a very dangerous range     Is the patient currently scheduled? No    Preferred time to be seen: asap    Caller Information       Type Contact Phone/Fax    07/19/2023 12:11 PM CDT Phone (Incoming) Christine Yoon (Self) [phone] (M)          Alternative phone number: none    Can a detailed message be left? Yes    Message Turnaround:     [place]:    Please give this turnaround time to the caller:   \"This message will be sent to [state Provider's name]. The clinical team will fulfill your request as soon as they review your message.\"   [phone]

## 2023-09-15 NOTE — ED ADULT NURSE NOTE - ISOLATION TYPE:
Your CT scan of the orbits today was normal.  No signs of orbital cellulitis.  I have concern about the left cornea.  At around 5:00 there is an abnormality and I want ophthalmology to evaluate for an early corneal ulcer.    Go today to see [place] [facility] at 3 PM.  You will need to be seen at the [facility] location in [place].  Address and telephone number provided.  
None

## 2023-09-18 NOTE — H&P ADULT - NSHPREVIEWOFSYSTEMS_GEN_ALL_CORE
REVIEW OF SYSTEMS:  CONSTITUTIONAL: No fever, weight loss, or fatigue  EYES: No eye pain, visual disturbances, or discharge  ENMT:  No difficulty hearing, tinnitus, vertigo; No sinus or throat pain  NECK: No pain or stiffness  RESPIRATORY: No cough, wheezing, chills or hemoptysis; No shortness of breath  CARDIOVASCULAR: +chest pain  GASTROINTESTINAL: No abdominal or epigastric pain. No nausea, vomiting, or hematemesis; No diarrhea or constipation. No melena or hematochezia.  GENITOURINARY: No dysuria, frequency, hematuria, or incontinence  NEUROLOGICAL: No headaches, memory loss, loss of strength, numbness, or tremors  SKIN: No itching, burning, rashes, or lesions   LYMPH NODES: No enlarged glands  ENDOCRINE: No heat or cold intolerance; No hair loss  MUSCULOSKELETAL: No joint pain or swelling; No muscle, back, or extremity pain  PSYCHIATRIC: No depression, anxiety, mood swings, or difficulty sleeping  HEME/LYMPH: No easy bruising, or bleeding gums  ALLERGY AND IMMUNOLOGIC: No hives or eczema    ALL ROS REVIEWED AND NORMAL EXCEPT AS STATED ABOVE

## 2023-09-18 NOTE — H&P ADULT - ASSESSMENT
Chest Pain, rule out ACS  - Trend troponin, monitor EKG  - Cardiac monitoring   - ASA 81mg daily, Lipitor 40mg qhs  - HbA1C, TSH, Lipid panel  - Cardiology consulted by ED - Dr. 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 At bedside comfortable on 4L NC, O2 sat 96%   -Continue supplemental oxygen with nasal canula to maintain SpO2 > 88%  -Continue home meds - spiriva qd, symbicort bid, albuterol q6h prn    CKD3   -Monitor renal function     Vitals q8h  Diet: DASH  Activity: Bedrest   PT/OT as tolerated  DVT ppx: Lovenox  GI ppx: PPI  Standard precautions: Aspiration, fall, safety, seizure, skin  Code Status: Full Code  HCP: Daughter Frida - patient prefers to update family

## 2023-09-18 NOTE — H&P ADULT - HISTORY OF PRESENT ILLNESS
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 Patient seen and examined at bedside, stable, NAD. 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 Patient seen and examined at bedside, stable, NAD. Endorses left sided chest pain noticed while watching TV last night, took ASA with minimal improvement. 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 +mild diffuse headache, intermittent palpitations. Denies sob, cough, nausea, vomiting, abd pain, change vision.

## 2023-09-18 NOTE — CONSULT NOTE ADULT - SUBJECTIVE AND OBJECTIVE BOX
JAYLON SINGLETON  [id number]      HPI:    Jaylon Singleton is an 89 year old man with past medical history of Atrial fibrillation (on Eliquis), HFrEF (LVEF 25-30% in 2019), Hypertension, Chronic kidney disease and history of CVA presents with acute onset of chest discomfort.    The patient reports that for one day now he has had chest discomfort radiating to his left arm. He also reports shortness of breath. He reports that he is compliant with his medications and follows with cardiologist, Dewayne Rubio.       ALLERGIES:  No Known Allergies      PAST MEDICAL & SURGICAL HISTORY:  Atrial fibrillation   Congestive heart failure (CHF)  CVA (cerebral vascular accident)  Hypertension, unspecified type  No significant past surgical history      CURRENT MEDICATIONS  albuterol    90 MICROgram(s) HFA Inhaler 2 Puff(s) Inhalation every 6 hours PRN  allopurinol 100 milliGRAM(s) Oral daily  apixaban 5 milliGRAM(s) Oral every 12 hours  aspirin enteric coated 81 milliGRAM(s) Oral daily  budesonide 160 MICROgram(s)/formoterol 4.5 MICROgram(s) Inhaler 2 Puff(s) Inhalation two times a day  furosemide    Tablet 40 milliGRAM(s) Oral two times a day  hydrALAZINE 25 milliGRAM(s) Oral every 8 hours  metoprolol succinate ER 50 milliGRAM(s) Oral daily  multivitamin 1 Tablet(s) Oral daily  pantoprazole    Tablet 40 milliGRAM(s) Oral before breakfast  tiotropium 2.5 MICROgram(s) Inhaler 2 Puff(s) Inhalation daily      ROS:  All 10 systems reviewed and positives noted in HPI    OBJECTIVE:    VITAL SIGNS:  Vital Signs Last 24 Hrs  T(C): 37.1 (18 Sep 2023 11:48), Max: 37.1 (18 Sep 2023 11:48)  T(F): 98.8 (18 Sep 2023 11:48), Max: 98.8 (18 Sep 2023 11:48)  HR: 89 (18 Sep 2023 13:18) (87 - 96)  BP: 131/63 (18 Sep 2023 13:18) (131/63 - 171/83)  BP(mean): --  RR: 18 (18 Sep 2023 13:18) (16 - 18)  SpO2: 96% (18 Sep 2023 13:18) (88% - 96%)    Parameters below as of 18 Sep 2023 13:18  Patient On (Oxygen Delivery Method): nasal cannula  O2 Flow (L/min): 2      PHYSICAL EXAM:  General: mild respiratory distress  HEENT: sclera anicteric  Neck: supple  CVS: JVP ~ 9 cm H20, irregularly irregular, s1, s2  Chest: mildly labored respirations, decreased breath sounds   Extremities: no lower extremity edema b/l  Neuro: awake, alert & oriented  Psych: normal affect      LABS:                        13.2   12.46 )-----------( 175      ( 18 Sep 2023 12:07 )             40.9     09-18    139  |  100  |  35<H>  ----------------------------<  112<H>  3.4<L>   |  30  |  1.63<H>    Ca    8.6      18 Sep 2023 12:07    TPro  7.6  /  Alb  3.0<L>  /  TBili  0.9  /  DBili  x   /  AST  19  /  ALT  15  /  AlkPhos  68  09-18        TTE (12/17/22):  LVEF 45-50%, mild LVH, regional wall motion abnormalities  Moderate MR, Mild-moderate TR    ECG (9/18/23): atrial fibrillation, lateral ST depressions (similar to prior ECG)

## 2023-09-18 NOTE — H&P ADULT - NSHPPHYSICALEXAM_GEN_ALL_CORE
T(C): 37.1 (09-18-23 @ 11:48), Max: 37.1 (09-18-23 @ 11:48)  HR: 89 (09-18-23 @ 13:18) (87 - 96)  BP: 131/63 (09-18-23 @ 13:18) (131/63 - 171/83)  RR: 18 (09-18-23 @ 13:18) (16 - 18)  SpO2: 96% (09-18-23 @ 13:18) (88% - 96%)  Wt(kg): --Vital Signs Last 24 Hrs  T(C): 37.1 (18 Sep 2023 11:48), Max: 37.1 (18 Sep 2023 11:48)  T(F): 98.8 (18 Sep 2023 11:48), Max: 98.8 (18 Sep 2023 11:48)  HR: 89 (18 Sep 2023 13:18) (87 - 96)  BP: 131/63 (18 Sep 2023 13:18) (131/63 - 171/83)  BP(mean): --  RR: 18 (18 Sep 2023 13:18) (16 - 18)  SpO2: 96% (18 Sep 2023 13:18) (88% - 96%)    Parameters below as of 18 Sep 2023 13:18  Patient On (Oxygen Delivery Method): nasal cannula  O2 Flow (L/min): 2      PHYSICAL EXAM:  GENERAL: NAD, well-groomed, well-developed  HEAD:  Atraumatic, Normocephalic  EYES: EOMI, PERRLA, conjunctiva and sclera clear  ENMT: No tonsillar erythema, exudates, or enlargement; Moist mucous membranes, Good dentition, No lesions  NECK: Supple, No JVD, Normal thyroid  NERVOUS SYSTEM:  Alert & Oriented X3, Good concentration; Motor Strength 5/5 B/L upper and lower extremities; DTRs 2+ intact and symmetric  CHEST/LUNG: Clear to percussion bilaterally; No rales, rhonchi, wheezing, or rubs  HEART: Regular rate and rhythm; No murmurs, rubs, or gallops  ABDOMEN: Soft, Nontender, +mild abd distension  EXTREMITIES:  2+ Peripheral Pulses, No clubbing, cyanosis, or edema  LYMPH: No lymphadenopathy noted  SKIN: No rashes or lesions

## 2023-09-18 NOTE — ED ADULT TRIAGE NOTE - ARRIVAL INFO ADDITIONAL COMMENTS
Patient BIB EMS from restaurant where he actibvated 911 for left sided chest pain and pressure radiating to back, jaw and shoulder. No elevation noted on EKG by EMS. 4 baby ASA administered en route, 1 nitro administered by EMS. 20G placed by EMS. Alert and oriented, history of AF and CHF on Eliquis, last does yesterday.

## 2023-09-18 NOTE — H&P ADULT - NSHPSOCIALHISTORY_GEN_ALL_CORE
smoke 3 cigars per day > 20 years. +1-2 EtOH several days/week. Denies illicit drug use. ambulates with RW. Lives in home with his children.     Fam hx - mother, father, , PMH unknown

## 2023-09-18 NOTE — H&P ADULT - NSHPLABSRESULTS_GEN_ALL_CORE
LABS:                        13.2   12.46 )-----------( 175      ( 18 Sep 2023 12:07 )             40.9     09-18    139  |  100  |  35<H>  ----------------------------<  112<H>  3.4<L>   |  30  |  1.63<H>    Ca    8.6      18 Sep 2023 12:07    TPro  7.6  /  Alb  3.0<L>  /  TBili  0.9  /  DBili  x   /  AST  19  /  ALT  15  /  AlkPhos  68  09-18      Urinalysis Basic - ( 18 Sep 2023 12:07 )    Color: x / Appearance: x / SG: x / pH: x  Gluc: 112 mg/dL / Ketone: x  / Bili: x / Urobili: x   Blood: x / Protein: x / Nitrite: x   Leuk Esterase: x / RBC: x / WBC x   Sq Epi: x / Non Sq Epi: x / Bacteria: x      CAPILLARY BLOOD GLUCOSE      Urinalysis Basic - ( 18 Sep 2023 12:07 )    Color: x / Appearance: x / SG: x / pH: x  Gluc: 112 mg/dL / Ketone: x  / Bili: x / Urobili: x   Blood: x / Protein: x / Nitrite: x   Leuk Esterase: x / RBC: x / WBC x   Sq Epi: x / Non Sq Epi: x / Bacteria: x        RADIOLOGY & ADDITIONAL TESTS:        Consultant(s) Notes Reviewed:  [x ] YES  [ ] NO  Care Discussed with Consultants/Other Providers [ x] YES  [ ] NO  Imaging Personally Reviewed:  [ ] YES  [ ] NO

## 2023-09-18 NOTE — ED PROVIDER NOTE - OBJECTIVE STATEMENT
Left chest pain  upon waking up around 4 am today.  Sometimes ago prior episode .  cardiologis Dr. velasco at [facility].

## 2023-09-19 NOTE — PROGRESS NOTE ADULT - ASSESSMENT
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 Hong    Chronic afib   Chronic combined CHF (EF 50%) - Lg Left pleural effusion   - metoprolol 50mg Bid   -Continue AC with eliquis  -Follow echo- reviewed , probnp  2100 - , ddimer- 5604  -Continue home med lasix 40mg BID     HTN  -Continue home meds - hydralazine 25mg TID, Toprol XL 50mg qd    COPD, chronic, stable  -Per patient, not on home O2. 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 Hong- rec CT chest to evaluate pleural effusion,  r/o PE- if r/o  recommend IV lasix   -CT chest       Small/moderate size left pleural effusion.  Lingular and left basilar parenchymal opacification favored to represent  atelectasis.  Partially imaged 6.2 cm right renal lesion; this lesion is indeterminate,   but could represent a hemorrhagic/proteinaceous cyst.    Chronic afib   Chronic combined CHF (EF 50%) -  - metoprolol 50mg Bid   -Continue AC with eliquis  -Follow echo- reviewed , probnp  2100 - , ddimer- 5604  -Continue home med lasix 40mg BID   -ordered VQ scan r/o PE  -dered Bilat lower ext dopplers     HTN  -Continue home meds - hydralazine 25mg TID, Toprol XL 50mg qd    COPD, chronic, stable  -Per patient, not on home O2. 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 Hong- rec r/o PE  -CT chest w/o contrast:      Small/moderate size left pleural effusion.  Lingular and left basilar parenchymal opacification favored to represent  atelectasis. Partially imaged 6.2 cm right renal lesion; this lesion is indeterminate, but could represent a hemorrhagic/proteinaceous cyst.  -V/Q scan pending, LE ULT negative for DVT    #Adenovirus  - C/w contact and respiratory droplet precautions  - Supportive care    #COPD, chronic, stable  -Per patient, not on home O2. At bedside comfortable on 4L NC, O2 sat 96%   -Continue supplemental oxygen with nasal canula to maintain SpO2 > 88%  -Continue home meds - spiriva qd, symbicort bid, albuterol q6h prn    #CKD3   Hypokalemia  -K 3.0  -replete   Mag level in am   -Monitor renal function     DVT PPX: Eliquis  AM labs, PT eval   Code Status: Full Code  HCP: Daughter Frida - patient prefers to update family

## 2023-09-19 NOTE — DIETITIAN INITIAL EVALUATION ADULT - ADD RECOMMEND
1. Recommend continue current nutrition plan of care as tolerated   2. Encouraged PO intakes  3. [place] patients daily food preferences as feasible with prescribed diet   4. Monitor daily PO intakes, GI tolerance, labs, weights, skin integrity, & BM regularity   5. Obtain new weight

## 2023-09-19 NOTE — DIETITIAN INITIAL EVALUATION ADULT - PERTINENT MEDS FT
MEDICATIONS  (STANDING):  allopurinol 100 milliGRAM(s) Oral daily  apixaban 2.5 milliGRAM(s) Oral every 12 hours  aspirin enteric coated 81 milliGRAM(s) Oral daily  atorvastatin 40 milliGRAM(s) Oral at bedtime  budesonide 160 MICROgram(s)/formoterol 4.5 MICROgram(s) Inhaler 2 Puff(s) Inhalation two times a day  furosemide   Injectable 40 milliGRAM(s) IV Push daily  hydrALAZINE 25 milliGRAM(s) Oral every 8 hours  melatonin 6 milliGRAM(s) Oral at bedtime  metoprolol succinate ER 50 milliGRAM(s) Oral daily  multivitamin 1 Tablet(s) Oral daily  pantoprazole    Tablet 40 milliGRAM(s) Oral before breakfast  polyethylene glycol 3350 17 Gram(s) Oral daily  senna 2 Tablet(s) Oral at bedtime  tiotropium 2.5 MICROgram(s) Inhaler 2 Puff(s) Inhalation daily    MEDICATIONS  (PRN):  albuterol    90 MICROgram(s) HFA Inhaler 2 Puff(s) Inhalation every 6 hours PRN Shortness of Breath and/or Wheezing

## 2023-09-19 NOTE — DIETITIAN INITIAL EVALUATION ADULT - PERTINENT LABORATORY DATA
09-19    137  |  101  |  41<H>  ----------------------------<  221<H>  3.0<L>   |  29  |  1.55<H>    Ca    8.6      19 Sep 2023 07:28  Mg     2.1     09-19    TPro  7.6  /  Alb  3.0<L>  /  TBili  0.9  /  DBili  x   /  AST  19  /  ALT  15  /  AlkPhos  68  09-18  A1C with Estimated Average Glucose Result: 5.8 % (09-19-23 @ 07:28)

## 2023-09-19 NOTE — DIETITIAN INITIAL EVALUATION ADULT - OTHER INFO
Patient noted with fair appetite, consuming 51-75% of meals at this time as per nursing flow sheets and meal tray observation. Hypokalemia noted and repleted with KCl. Skin intact. No pressure injuries or edema noted. Last BM noted to be today 19-Sep-2023. Provided DASH/TLC nutrition therapy education; low sodium, cholesterol and saturated fat, w/ emphasis on increasing whole grains, fruits and vegetables/balanced meals, avoidance of processed foods.    No height or weight noted. Patient unable to recall any anthropometric measurements. Height and weight obtained from previous RD note on 1/9/23. Recommend obtaining updated anthropometric measurements.

## 2023-09-19 NOTE — PROGRESS NOTE ADULT - SUBJECTIVE AND OBJECTIVE BOX
Patient is a 89y old  Male who presents with a chief complaint of chest pain r/o ACS (18 Sep 2023 14:37)      Patient seen and examined at bedside.    ALLERGIES:  No Known Allergies    MEDICATIONS  (STANDING):  allopurinol 100 milliGRAM(s) Oral daily  apixaban 2.5 milliGRAM(s) Oral every 12 hours  aspirin enteric coated 81 milliGRAM(s) Oral daily  atorvastatin 40 milliGRAM(s) Oral at bedtime  budesonide 160 MICROgram(s)/formoterol 4.5 MICROgram(s) Inhaler 2 Puff(s) Inhalation two times a day  furosemide    Tablet 40 milliGRAM(s) Oral two times a day  hydrALAZINE 25 milliGRAM(s) Oral every 8 hours  melatonin 6 milliGRAM(s) Oral at bedtime  metoprolol succinate ER 50 milliGRAM(s) Oral daily  multivitamin 1 Tablet(s) Oral daily  pantoprazole    Tablet 40 milliGRAM(s) Oral before breakfast  polyethylene glycol 3350 17 Gram(s) Oral daily  senna 2 Tablet(s) Oral at bedtime  tiotropium 2.5 MICROgram(s) Inhaler 2 Puff(s) Inhalation daily    MEDICATIONS  (PRN):  albuterol    90 MICROgram(s) HFA Inhaler 2 Puff(s) Inhalation every 6 hours PRN Shortness of Breath and/or Wheezing    Vital Signs Last 24 Hrs  T(F): 97.8 (19 Sep 2023 06:05), Max: 98.8 (18 Sep 2023 11:48)  HR: 77 (19 Sep 2023 06:05) (62 - 99)  BP: 159/89 (19 Sep 2023 06:05) (122/77 - 173/67)  RR: 19 (19 Sep 2023 06:05) (16 - 20)  SpO2: 98% (19 Sep 2023 06:05) (88% - 99%)  I&O's Summary    PHYSICAL EXAM:  General: NAD, A/O x 3  ENT: MMM  Neck: Supple, No JVD  Lungs: Clear to auscultation bilaterally, Non labored breathing   Cardio: RRR, S1/S2, No murmurs  Abdomen: Soft, Nontender, Nondistended; Bowel sounds present  Extremities: No calf tenderness, No pitting edema    LABS:                        13.2   12.46 )-----------( 175      ( 18 Sep 2023 12:07 )             40.9     09-18    139  |  100  |  35  ----------------------------<  112  3.4   |  30  |  1.63    Ca    8.6      18 Sep 2023 12:07    TPro  7.6  /  Alb  3.0  /  TBili  0.9  /  DBili  x   /  AST  19  /  ALT  15  /  AlkPhos  68  09-18          CARDIAC MARKERS ( 18 Sep 2023 15:00 )  x     / 29.6 ng/L / x     / x     / x      CARDIAC MARKERS ( 18 Sep 2023 12:07 )  x     / 24.5 ng/L / x     / x     / x                            Urinalysis Basic - ( 18 Sep 2023 12:07 )    Color: x / Appearance: x / SG: x / pH: x  Gluc: 112 mg/dL / Ketone: x  / Bili: x / Urobili: x   Blood: x / Protein: x / Nitrite: x   Leuk Esterase: x / RBC: x / WBC x   Sq Epi: x / Non Sq Epi: x / Bacteria: x          RADIOLOGY & ADDITIONAL TESTS:    Care Discussed with Consultants/Other Providers:    Patient is a 89y old  Male who presents with a chief complaint of chest pain r/o ACS (18 Sep 2023 14:37)      Patient seen and examined at bedside.  Pt sitting up in recliner with feet up.  Pt states SOB with O2 in place- does not have O2 at home   pt also notes feet have been swelling during the days prior to hospitalization.  No current CP .  Pt with hoarseness   ALLERGIES:  No Known Allergies    MEDICATIONS  (STANDING):  allopurinol 100 milliGRAM(s) Oral daily  apixaban 2.5 milliGRAM(s) Oral every 12 hours  aspirin enteric coated 81 milliGRAM(s) Oral daily  atorvastatin 40 milliGRAM(s) Oral at bedtime  budesonide 160 MICROgram(s)/formoterol 4.5 MICROgram(s) Inhaler 2 Puff(s) Inhalation two times a day  furosemide    Tablet 40 milliGRAM(s) Oral two times a day  hydrALAZINE 25 milliGRAM(s) Oral every 8 hours  melatonin 6 milliGRAM(s) Oral at bedtime  metoprolol succinate ER 50 milliGRAM(s) Oral daily  multivitamin 1 Tablet(s) Oral daily  pantoprazole    Tablet 40 milliGRAM(s) Oral before breakfast  polyethylene glycol 3350 17 Gram(s) Oral daily  senna 2 Tablet(s) Oral at bedtime  tiotropium 2.5 MICROgram(s) Inhaler 2 Puff(s) Inhalation daily    MEDICATIONS  (PRN):  albuterol    90 MICROgram(s) HFA Inhaler 2 Puff(s) Inhalation every 6 hours PRN Shortness of Breath and/or Wheezing    Vital Signs Last 24 Hrs  T(F): 97.8 (19 Sep 2023 06:05), Max: 98.8 (18 Sep 2023 11:48)  HR: 77 (19 Sep 2023 06:05) (62 - 99)  BP: 159/89 (19 Sep 2023 06:05) (122/77 - 173/67)  RR: 19 (19 Sep 2023 06:05) (16 - 20)  SpO2: 98% (19 Sep 2023 06:05) (88% - 99%)  I&O's Summary    PHYSICAL EXAM:  General: 89 y/o male in NAD, A/O x 3 with 3 lO2 nasal canula in place at 96%   ENT: MMM  Neck: Supple, No JVD  Lungs: decreased BS bilat  to auscultation bilaterally, Non labored breathing   Cardio: RRR, S1/S2, No murmurs  Abdomen: Soft, Nontender, Nondistended; Bowel sounds present  Extremities: No calf tenderness, No pitting edema, Venous stasis changes noted     LABS:                        13.2   12.46 )-----------( 175      ( 18 Sep 2023 12:07 )             40.9     09-18    139  |  100  |  35  ----------------------------<  112  3.4   |  30  |  1.63    Ca    8.6      18 Sep 2023 12:07    TPro  7.6  /  Alb  3.0  /  TBili  0.9  /  DBili  x   /  AST  19  /  ALT  15  /  AlkPhos  68  09-18          CARDIAC MARKERS ( 18 Sep 2023 15:00 )  x     / 29.6 ng/L / x     / x     / x      CARDIAC MARKERS ( 18 Sep 2023 12:07 )  x     / 24.5 ng/L / x     / x     / x                            Urinalysis Basic - ( 18 Sep 2023 12:07 )    Color: x / Appearance: x / SG: x / pH: x  Gluc: 112 mg/dL / Ketone: x  / Bili: x / Urobili: x   Blood: x / Protein: x / Nitrite: x   Leuk Esterase: x / RBC: x / WBC x   Sq Epi: x / Non Sq Epi: x / Bacteria: x          RADIOLOGY & ADDITIONAL TESTS:    Care Discussed with Consultants/Other Providers:    Patient is a 89y old  Male who presents with a chief complaint of chest pain r/o ACS (18 Sep 2023 14:37)      Patient seen and examined at bedside.  Pt sitting up in recliner with feet up.  Pt states SOB with O2 in place- does not have O2 at home   pt also notes feet have been swelling during the days prior to hospitalization.  No current CP .  Pt with hoarseness   ALLERGIES:  No Known Allergies    MEDICATIONS  (STANDING):  allopurinol 100 milliGRAM(s) Oral daily  apixaban 2.5 milliGRAM(s) Oral every 12 hours  aspirin enteric coated 81 milliGRAM(s) Oral daily  atorvastatin 40 milliGRAM(s) Oral at bedtime  budesonide 160 MICROgram(s)/formoterol 4.5 MICROgram(s) Inhaler 2 Puff(s) Inhalation two times a day  furosemide    Tablet 40 milliGRAM(s) Oral two times a day  hydrALAZINE 25 milliGRAM(s) Oral every 8 hours  melatonin 6 milliGRAM(s) Oral at bedtime  metoprolol succinate ER 50 milliGRAM(s) Oral daily  multivitamin 1 Tablet(s) Oral daily  pantoprazole    Tablet 40 milliGRAM(s) Oral before breakfast  polyethylene glycol 3350 17 Gram(s) Oral daily  senna 2 Tablet(s) Oral at bedtime  tiotropium 2.5 MICROgram(s) Inhaler 2 Puff(s) Inhalation daily    MEDICATIONS  (PRN):  albuterol    90 MICROgram(s) HFA Inhaler 2 Puff(s) Inhalation every 6 hours PRN Shortness of Breath and/or Wheezing    Vital Signs Last 24 Hrs  T(F): 97.8 (19 Sep 2023 06:05), Max: 98.8 (18 Sep 2023 11:48)  HR: 77 (19 Sep 2023 06:05) (62 - 99)  BP: 159/89 (19 Sep 2023 06:05) (122/77 - 173/67)  RR: 19 (19 Sep 2023 06:05) (16 - 20)  SpO2: 98% (19 Sep 2023 06:05) (88% - 99%)  I&O's Summary    PHYSICAL EXAM:  General: 88 y/o male in NAD, A/O x 3 with 3 lO2 nasal canula in place at 96%   ENT: MMM  Neck: Supple, No JVD  Lungs: decreased BS bilat  to auscultation bilaterally, Non labored breathing   Cardio: RRR, S1/S2, No murmurs  Abdomen: Soft, Nontender, Nondistended; Bowel sounds present  Extremities: No calf tenderness, No pitting edema, Venous stasis changes noted     LABS:                        13.2   12.46 )-----------( 175      ( 18 Sep 2023 12:07 )             40.9     09-18    139  |  100  |  35  ----------------------------<  112  3.4   |  30  |  1.63    Ca    8.6      18 Sep 2023 12:07    TPro  7.6  /  Alb  3.0  /  TBili  0.9  /  DBili  x   /  AST  19  /  ALT  15  /  AlkPhos  68  09-18    CARDIAC MARKERS ( 18 Sep 2023 15:00 )  x     / 29.6 ng/L / x     / x     / x      CARDIAC MARKERS ( 18 Sep 2023 12:07 )  x     / 24.5 ng/L / x     / x     / x        Urinalysis Basic - ( 18 Sep 2023 12:07 )    Color: x / Appearance: x / SG: x / pH: x  Gluc: 112 mg/dL / Ketone: x  / Bili: x / Urobili: x   Blood: x / Protein: x / Nitrite: x   Leuk Esterase: x / RBC: x / WBC x   Sq Epi: x / Non Sq Epi: x / Bacteria: x

## 2023-09-19 NOTE — PATIENT PROFILE ADULT - FALL HARM RISK - HARM RISK INTERVENTIONS

## 2023-09-19 NOTE — DIETITIAN INITIAL EVALUATION ADULT - ORAL INTAKE PTA/DIET HISTORY
Patient not interested in providing diet history prior to admission at this time. No food allergies noted in chart.

## 2023-09-19 NOTE — PROGRESS NOTE ADULT - SUBJECTIVE AND OBJECTIVE BOX
RAD SINGLETON  [id number]      Chief Complaint: CHF/Adenovirus/Atrial fibrillation    Interval History: The patient reports that his breathing is minimally improved today. Reports chest discomfort.     Tele: atrial fibrillation 70s BPM, NSVT      Current meds:   albuterol    90 MICROgram(s) HFA Inhaler 2 Puff(s) Inhalation every 6 hours PRN  allopurinol 100 milliGRAM(s) Oral daily  apixaban 2.5 milliGRAM(s) Oral every 12 hours  aspirin enteric coated 81 milliGRAM(s) Oral daily  atorvastatin 40 milliGRAM(s) Oral at bedtime  budesonide 160 MICROgram(s)/formoterol 4.5 MICROgram(s) Inhaler 2 Puff(s) Inhalation two times a day  furosemide    Tablet 40 milliGRAM(s) Oral two times a day  hydrALAZINE 25 milliGRAM(s) Oral every 8 hours  melatonin 6 milliGRAM(s) Oral at bedtime  metoprolol succinate ER 50 milliGRAM(s) Oral daily  multivitamin 1 Tablet(s) Oral daily  pantoprazole    Tablet 40 milliGRAM(s) Oral before breakfast  polyethylene glycol 3350 17 Gram(s) Oral daily  senna 2 Tablet(s) Oral at bedtime  tiotropium 2.5 MICROgram(s) Inhaler 2 Puff(s) Inhalation daily      Objective:     Vital Signs:   T(C): 36.6 (09-19-23 @ 06:05), Max: 37.1 (09-18-23 @ 11:48)  HR: 77 (09-19-23 @ 06:05) (62 - 99)  BP: 159/89 (09-19-23 @ 06:05) (122/77 - 173/67)  RR: 19 (09-19-23 @ 06:05) (16 - 20)  SpO2: 98% (09-19-23 @ 06:05) (88% - 99%)    Physical Exam:   General: mild respiratory distress  HEENT: sclera anicteric  Neck: supple  CVS: JVP ~ 9 cm H20, irregularly irregular, s1, s2  Chest: mildly labored respirations, decreased breath sounds   Extremities: no lower extremity edema b/l  Neuro: awake, alert & oriented  Psych: normal affect      Labs:   19 Sep 2023 07:28    137    |  101    |  41     ----------------------------<  221    3.0     |  29     |  1.55     Ca    8.6        19 Sep 2023 07:28  Mg     2.1       19 Sep 2023 07:28    TPro  7.6    /  Alb  3.0    /  TBili  0.9    /  DBili  x      /  AST  19     /  ALT  15     /  AlkPhos  68     18 Sep 2023 12:07                          12.1   12.13 )-----------( 174      ( 19 Sep 2023 07:28 )             36.3           TTE (12/17/22):  LVEF 45-50%, mild LVH, regional wall motion abnormalities  Moderate MR, Mild-moderate TR    TTE (9/18/23):   1. Low normal global left ventricular systolic function.   2. Left ventricular ejection fraction, by visual estimation, is 50%.   3. Increased LV wall thickness.   4. Normal left ventricular internal cavity size.   5. Normal right ventricular size and function.   6. Mildly enlarged left atrium.   7. Mildly enlarged right atrium.   8. Mild thickening and calcification of the anterior and posterior   mitral valve leaflets.   9. Moderately decreased mitral leaflet mobility.  10. 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 Moderate mitral valve stenosis (mean gradient 8 mmHg, HR not recorded, MVA 1.4 cm^2).  11. Mild mitral valve regurgitation.  12. Mild tricuspid regurgitation.  13. The aortic valve is not well visualized, appears heavily calcified.  14. Sclerotic aortic valve with normal opening.  15. Mild aortic regurgitation.  16. Large pleural effusion in the left lateral region.  17. Trivial pericardial effusion.  18. There is a significant pericardial fat pad present.      ECG (9/18/23): atrial fibrillation, lateral ST depressions (similar to prior ECG)

## 2023-09-19 NOTE — ED ADULT NURSE NOTE - NS ED NURSE LEVEL OF CONSCIOUSNESS SPEECH
Speaking Coherently Cartilage Graft Text: The defect edges were debeveled with a #15 scalpel blade. Given the location of the defect, shape of the defect, the fact the defect involved a full thickness cartilage defect a cartilage graft was deemed most appropriate.  An appropriate donor site was identified, cleansed, and anesthetized. The cartilage graft was then harvested and transferred to the recipient site, oriented appropriately and then sutured into place.  The secondary defect was then repaired using a primary closure.

## 2023-09-19 NOTE — PROGRESS NOTE ADULT - NS ATTEND AMEND GEN_ALL_CORE FT
Chest pain  Adenovirus  Hypokalemia  R/O PE    Telemetry, cardiology, V/Q scan  ULT LE negative for DVT  Isolation  Replace K  Eliquis  C/w current medications    DVT PPX, AM labs

## 2023-09-19 NOTE — PATIENT PROFILE ADULT - NSTRANSFERBELONGINGSDISPO_GEN_A_NUR
Patient was offered to give belongings including $705 cash to security to be kept in safe, pt refused./with patient

## 2023-09-20 NOTE — CONSULT NOTE ADULT - ASSESSMENT
Assessment:  Jaylon Antony is an 89 year old man with past medical history of Atrial fibrillation (on Eliquis), HFrEF (LVEF 25-30% in 2019), Hypertension, Chronic kidney disease and history of CVA presents with acute onset of chest discomfort and shortness of breath, concerning for acute on chronic systolic heart failure exacerbation.     ECG consistent with atrial fibrillation and lateral ST depressions which are generally similar to prior ECG. Initial troponin negative. Pro BNP elevated but less than prior hospitalization. Prior echo report consistent with LVEF 45-50% with moderate mitral regurgitation.     Recommendations:  [] Chest discomfort: Would check second troponin. Check echo to re-evaluate LVEF and valvular disease. Continue to monitor on telemetry. Follow up CXR, may benefit from CT chest.  [] HFrEF: LVEF 45-50% in December 2022. S/p IV lasix, will follow up echo to help guide diuretic dosing. Continue guideline directed medical therapy; Metoprolol succinate 50 mg daily. Abnormal renal function may limit GDMT options.   [] Atrial fibrillation: Rate controlled. Continue Eliquis for stroke prophylaxis.     We will continue to follow along.     eKvin Hong MD  Cardiology   
Assessment  Acute on chronic  Diastolic CHF     improving with Diuresis  Based on CT Small/moderate size left pleural effusion too small to drain, and may pose increased risk than benefit  Elevated DDimer -  is non specific but does have - 6.2 cm right renal lesion; this lesion is indeterminate, but could represent a hemorrhagic/proteinaceous cyst.     no evidence of VTE  Afib on eliquis  Cigar smoker, at risk for copd.     Plan  Imaging reviewed doubt VTE as cause of hypoxia  If VTE/PE patient would not be stating getting better and less sob  Able to wean o2 to room air when seen and able to maintain sat 95%  however patient does complain of orthopnea that does suggest cardiac component.      will recommend to diuresis at this time. and monitor sat on room air  No acute copd noted despite cigar smoking  will recommend to check renal us to f/u renal cyst  low suspicion of VTE at this time will continue eliquis at current dose.   d/w Dr. Hull

## 2023-09-20 NOTE — PROGRESS NOTE ADULT - NS ATTEND AMEND GEN_ALL_CORE FT
Acute hypoxic respiratory failue due to adenovirus  Acute on chronic combined CHF exacerbation  Possible PE????  Hypokalemia    Telemetry, cardiology/pulmonary consulted  IV lasix 40mg daily  V/Q scan showing indeterminant for PE....  Patient is on low dose eliquis for afib....  If pulm believes this could be PE....we would need to increase dose of eliquis to 5mg BID  ULT LE negative for DVT  Isolation for adenovirus  Oxygen weaning as tolerates  C/w current medications    DVT PPX, AM labs.

## 2023-09-20 NOTE — PROGRESS NOTE ADULT - SUBJECTIVE AND OBJECTIVE BOX
Patient is a 89y old  Male who presents with a chief complaint of Chest pain    Patient seen and examined at bedside.  no acute overnight events    ALLERGIES:  No Known Allergies        Vital Signs Last 24 Hrs  T(F): 97.6 (20 Sep 2023 05:23), Max: 97.9 (19 Sep 2023 16:17)  HR: 85 (20 Sep 2023 05:23) (61 - 85)  BP: 132/72 (20 Sep 2023 05:23) (132/72 - 159/83)  RR: 19 (20 Sep 2023 05:23) (19 - 20)  SpO2: 97% (20 Sep 2023 05:23) (96% - 98%)  I&O's Summary    19 Sep 2023 07:01  -  20 Sep 2023 07:00  --------------------------------------------------------  IN: 340 mL / OUT: 200 mL / NET: 140 mL      MEDICATIONS:  albuterol    90 MICROgram(s) HFA Inhaler 2 Puff(s) Inhalation every 6 hours PRN  allopurinol 100 milliGRAM(s) Oral daily  apixaban 2.5 milliGRAM(s) Oral every 12 hours  aspirin enteric coated 81 milliGRAM(s) Oral daily  atorvastatin 40 milliGRAM(s) Oral at bedtime  budesonide 160 MICROgram(s)/formoterol 4.5 MICROgram(s) Inhaler 2 Puff(s) Inhalation two times a day  furosemide   Injectable 40 milliGRAM(s) IV Push daily  hydrALAZINE 25 milliGRAM(s) Oral every 8 hours  melatonin 6 milliGRAM(s) Oral at bedtime  metoprolol succinate ER 50 milliGRAM(s) Oral daily  multivitamin 1 Tablet(s) Oral daily  pantoprazole    Tablet 40 milliGRAM(s) Oral before breakfast  polyethylene glycol 3350 17 Gram(s) Oral daily  senna 2 Tablet(s) Oral at bedtime  tiotropium 2.5 MICROgram(s) Inhaler 2 Puff(s) Inhalation daily      PHYSICAL EXAM:  General: NAD, A/O x 3  ENT: MMM, no thrush  Neck: Supple, No JVD  Lungs: Diminished BS, non labored, bilateral air entry  Cardio: RRR, S1/S2, No murmurs  Abdomen: Soft, Nontender, Nondistended; Bowel sounds present  Extremities: No cyanosis, No edema, Bilateral venous stasis    LABS:                        12.3   11.29 )-----------( 189      ( 20 Sep 2023 05:52 )             37.6     09-20    137  |  102  |  45  ----------------------------<  221  3.5   |  27  |  1.62    Ca    8.6      20 Sep 2023 05:52  Mg     2.2     09-20    TPro  7.6  /  Alb  3.0  /  TBili  0.9  /  DBili  x   /  AST  19  /  ALT  15  /  AlkPhos  68  09-18          CARDIAC MARKERS ( 18 Sep 2023 15:00 )  x     / 29.6 ng/L / x     / x     / x      CARDIAC MARKERS ( 18 Sep 2023 12:07 )  x     / 24.5 ng/L / x     / x     / x          09-19 Chol 128 mg/dL LDL -- HDL 56 mg/dL Trig 54 mg/dL                  Urinalysis Basic - ( 20 Sep 2023 05:52 )    Color: x / Appearance: x / SG: x / pH: x  Gluc: 221 mg/dL / Ketone: x  / Bili: x / Urobili: x   Blood: x / Protein: x / Nitrite: x   Leuk Esterase: x / RBC: x / WBC x   Sq Epi: x / Non Sq Epi: x / Bacteria: x            RADIOLOGY & ADDITIONAL TESTS:    Care Discussed with Consultants/Other Providers:    Patient is a 89y old  Male who presents with a chief complaint of Chest pain    Patient seen and examined at bedside.  no acute overnight events    ALLERGIES:  No Known Allergies        Vital Signs Last 24 Hrs  T(F): 97.6 (20 Sep 2023 05:23), Max: 97.9 (19 Sep 2023 16:17)  HR: 85 (20 Sep 2023 05:23) (61 - 85)  BP: 132/72 (20 Sep 2023 05:23) (132/72 - 159/83)  RR: 19 (20 Sep 2023 05:23) (19 - 20)  SpO2: 97% (20 Sep 2023 05:23) (96% - 98%)  I&O's Summary    19 Sep 2023 07:01  -  20 Sep 2023 07:00  --------------------------------------------------------  IN: 340 mL / OUT: 200 mL / NET: 140 mL      MEDICATIONS:  albuterol    90 MICROgram(s) HFA Inhaler 2 Puff(s) Inhalation every 6 hours PRN  allopurinol 100 milliGRAM(s) Oral daily  apixaban 2.5 milliGRAM(s) Oral every 12 hours  aspirin enteric coated 81 milliGRAM(s) Oral daily  atorvastatin 40 milliGRAM(s) Oral at bedtime  budesonide 160 MICROgram(s)/formoterol 4.5 MICROgram(s) Inhaler 2 Puff(s) Inhalation two times a day  furosemide   Injectable 40 milliGRAM(s) IV Push daily  hydrALAZINE 25 milliGRAM(s) Oral every 8 hours  melatonin 6 milliGRAM(s) Oral at bedtime  metoprolol succinate ER 50 milliGRAM(s) Oral daily  multivitamin 1 Tablet(s) Oral daily  pantoprazole    Tablet 40 milliGRAM(s) Oral before breakfast  polyethylene glycol 3350 17 Gram(s) Oral daily  senna 2 Tablet(s) Oral at bedtime  tiotropium 2.5 MICROgram(s) Inhaler 2 Puff(s) Inhalation daily      PHYSICAL EXAM:  General: NAD, A/O x 3  ENT: MMM, no thrush  Neck: Supple, No JVD  Lungs: Diminished BS, non labored, bilateral air entry  Cardio: RRR, S1/S2, No murmurs  Abdomen: Soft, Nontender, Nondistended; Bowel sounds present  Extremities: No cyanosis, No edema, Bilateral venous stasis    LABS:                        12.3   11.29 )-----------( 189      ( 20 Sep 2023 05:52 )             37.6     09-20    137  |  102  |  45  ----------------------------<  221  3.5   |  27  |  1.62    Ca    8.6      20 Sep 2023 05:52  Mg     2.2     09-20    TPro  7.6  /  Alb  3.0  /  TBili  0.9  /  DBili  x   /  AST  19  /  ALT  15  /  AlkPhos  68  09-18    CARDIAC MARKERS ( 18 Sep 2023 15:00 )  x     / 29.6 ng/L / x     / x     / x      CARDIAC MARKERS ( 18 Sep 2023 12:07 )  x     / 24.5 ng/L / x     / x     / x        09-19 Chol 128 mg/dL LDL -- HDL 56 mg/dL Trig 54 mg/dL    Urinalysis Basic - ( 20 Sep 2023 05:52 )    Color: x / Appearance: x / SG: x / pH: x  Gluc: 221 mg/dL / Ketone: x  / Bili: x / Urobili: x   Blood: x / Protein: x / Nitrite: x   Leuk Esterase: x / RBC: x / WBC x

## 2023-09-20 NOTE — CONSULT NOTE ADULT - SUBJECTIVE AND OBJECTIVE BOX
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   Patient seen and examined at bedside, stable, NAD. Endorses left sided chest pain noticed while watching TV last night, took ASA with minimal improvement. 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 +mild diffuse headache, intermittent palpitations. Denies sob, cough, nausea, vomiting, abd pain, change vision.  (18 Sep 2023 13:38)      BRIEF HOSPITAL COURSE: ***    PAST MEDICAL & SURGICAL HISTORY:  Afib  Congestive heart failure (CHF)  CVA (cerebral vascular accident)  Hypertension, unspecified type  No significant past surgical history    Allergies    No Known Allergies    Intolerances      FAMILY HISTORY:    Social history: Social History:  smoke 3 cigars per day > 20 years. +1-2 EtOH several days/week. Denies illicit drug use. ambulates with RW. Lives in home with his children.     Fam hx - mother, father, , PMH unknown (18 Sep 2023 13:38)       Smoking:     Drinking:     Drug use:    Review of Systems: as stated above    CONSTITUTIONAL: No fever, No chills, No fatigue  EYES: No eye pain, No visual disturbances, No discharge  ENMT:  No difficulty hearing, No tinnitus, No vertigo; No sinus or throat pain  NECK: No pain, No stiffness  RESPIRATORY: No Cough, No SOB, No Secretions  CARDIOVASCULAR: No chest pain, No palpitations, No dizziness, or No leg swelling  GASTROINTESTINAL: No abdominal or epigastric pain. No nausea, No vomiting, No hematemesis; No diarrhea, No constipation. No melena, No hematochezia.  GENITOURINARY: No dysuria, No frequency, No hematuria, No incontinence  NEUROLOGICAL: No headaches, No memory loss, No loss of strength, No numbness, No tremors  SKIN: No itching, No burning, No rashes, No lesions   MUSCULOSKELETAL: No joint pain or swelling; No muscle, back, No extremity pain  PSYCHIATRIC: No depression, No anxiety, No mood swings, No difficulty sleeping      Medications:  MEDICATIONS  (STANDING):  allopurinol 100 milliGRAM(s) Oral daily  apixaban 2.5 milliGRAM(s) Oral every 12 hours  aspirin enteric coated 81 milliGRAM(s) Oral daily  atorvastatin 40 milliGRAM(s) Oral at bedtime  budesonide 160 MICROgram(s)/formoterol 4.5 MICROgram(s) Inhaler 2 Puff(s) Inhalation two times a day  furosemide   Injectable 40 milliGRAM(s) IV Push daily  hydrALAZINE 25 milliGRAM(s) Oral every 8 hours  melatonin 6 milliGRAM(s) Oral at bedtime  metoprolol succinate ER 50 milliGRAM(s) Oral daily  multivitamin 1 Tablet(s) Oral daily  pantoprazole    Tablet 40 milliGRAM(s) Oral before breakfast  polyethylene glycol 3350 17 Gram(s) Oral daily  senna 2 Tablet(s) Oral at bedtime  tiotropium 2.5 MICROgram(s) Inhaler 2 Puff(s) Inhalation daily    MEDICATIONS  (PRN):  albuterol    90 MICROgram(s) HFA Inhaler 2 Puff(s) Inhalation every 6 hours PRN Shortness of Breath and/or Wheezing      Antibiotics History      Heme Medications   apixaban 2.5 milliGRAM(s) Oral every 12 hours, 23 @ 18:00  aspirin enteric coated 81 milliGRAM(s) Oral daily, 23 @ 14:30      GI Medications  pantoprazole    Tablet 40 milliGRAM(s) Oral before breakfast, 23 @ 14:31, Routine  polyethylene glycol 3350 17 Gram(s) Oral daily, 23 @ 14:39, Routine  senna 2 Tablet(s) Oral at bedtime, 23 @ 14:39, Routine        Home Medications:  Last Order Reconciliation Date: 23 @ 14:32 (Admission Reconciliation)  albuterol 90 mcg/inh inhalation aerosol: 2 puff(s) inhaled every 6 hours (23 @ 13:36)  allopurinol 100 mg oral tablet: 1 tab(s) orally once a day (23 @ 13:36)  apixaban 5 mg oral tablet: 1 tab(s) orally 2 times a day (23 @ 13:55)  aspirin 81 mg oral delayed release tablet: 1 tab(s) orally once a day (23 @ 14:50)  budesonide-formoterol 160 mcg-4.5 mcg/inh inhalation aerosol: 2 puff(s) inhaled 2 times a day (23 @ 13:36)  furosemide 40 mg oral tablet: 1 tab(s) orally 2 times a day (23 @ 13:36)  hydrALAZINE 25 mg oral tablet: 1 tab(s) orally every 8 hours (23 @ 13:36)  metoprolol succinate 50 mg oral tablet, extended release: 1 tab(s) orally once a day (23 @ 13:55)  Multiple Vitamins oral tablet: 1 tab(s) orally once a day (23 @ 13:31)  pantoprazole 40 mg oral delayed release tablet: 1 tab(s) orally once a day (before a meal) (23 @ 13:36)  tiotropium 2.5 mcg/inh inhalation aerosol: 2 puff(s) inhaled once a day (23 @ 13:36)      LABS:                        12.3   11.29 )-----------( 189      ( 20 Sep 2023 05:52 )             37.6         137  |  102  |  45<H>  ----------------------------<  221<H>  3.5   |  27  |  1.62<H>    Ca    8.6      20 Sep 2023 05:52  Mg     2.2           HIT ab --  @ 15:00  HIT ab EIA --  D Dimer -[id number]            Urinalysis Basic - ( 20 Sep 2023 05:52 )    Color: x / Appearance: x / SG: x / pH: x  Gluc: 221 mg/dL / Ketone: x  / Bili: x / Urobili: x   Blood: x / Protein: x / Nitrite: x   Leuk Esterase: x / RBC: x / WBC x   Sq Epi: x / Non Sq Epi: x / Bacteria: x              CULTURES: (if applicable)    Rapid RVP Result: Detected (23 @ 19:50)        CAPILLARY BLOOD GLUCOSE          RADIOLOGY  CXR:      CT:  < from: CT Chest No Cont (23 @ 09:27) >    ACC: [id number] EXAM:  CT CHEST   ORDERED BY: YUDELKA LARSEN     PROCEDURE DATE:  2023          INTERPRETATION:  HISTORY: Admitting Dxs: R07.9 CHEST PAIN, UNSPECIFIED.   Chest pain and possible consolidation versus effusion.    EXAMINATION: CT CHEST was performed without IV contrast.    COMPARISON: 2022.    FINDINGS:    AIRWAYS, LUNGS, PLEURA: Trachea and mainstem bronchi patent.   Small/moderate-sized left pleural effusion. Lingular and left basilar   parenchymal opacification.    MEDIASTINUM: Cardiomegaly. Trace pericardial effusion. Unchanged   calcifications near the aortic root. Dilated ascending thoracic aorta   measures 4.1 cm. Mediastinal lymph nodes decreased in size compared to   2022.    IMAGED ABDOMEN: Cholelithiasis. Partially imaged right renal lesion   measuring 6.2 cm with Hounsfield units of 25 (image 139, series 2).   Additional bilateral renal hypodense lesions may represent cysts.    SOFT TISSUES: Unremarkable.    BONES: Unremarkable.      IMPRESSION:.    Small/moderate size left pleural effusion.    Lingular and left basilar parenchymal opacification favored to represent   atelectasis.    Partially imaged 6.2 cm right renal lesion; this lesion is indeterminate,   but could represent a hemorrhagic/proteinaceous cyst.    --- End of Report ---      < end of copied text >    ECHO:  < from: TTE Echo Complete w/o Contrast w/ Doppler (23 @ 16:51) >  Summary:   1. Low normal global left ventricular systolic function.   2. Left ventricular ejection fraction, by visual estimation, is 50%.   3. Increased LV wall thickness.   4. Normal left ventricular internal cavity size.   5. Normal right ventricular size and function.   6. Mildly enlarged left atrium.   7. Mildly enlarged right atrium.   8. Mild thickening and calcification of the anterior and posterior mitral valve leaflets.   9. Moderately decreased mitral leaflet mobility.  10. 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 Moderate mitral valve stenosis (mean gradient  8 mmHg, HR not recorded, MVA 1.4 cm^2).  11. Mild mitral valve regurgitation.  12. Mild tricuspid regurgitation.  13. The aortic valve is not well visualized, appears heavily calcified.  14.Sclerotic aortic valve with normal opening.  15. Mild aortic regurgitation.  16. Large pleural effusion in the left lateral region.  17. Trivial pericardial effusion.  18. There is a significant pericardial fat pad present.    [id number] Kevin Hong MD Electronically signed on   2023 at 5:50:28 PM    < end of copied text >    US LE Duplex  < from: US Duplex Venous Lower Ext Complete, Bilateral (23 @ 11:45) >    IMPRESSION:  No evidence of deep venous thrombosis in either lower extremity.    < end of copied text >    NM VQ Scan  < from: NM Pulmonary Ventilation/Perfusion Scan (23 @ 15:25) >    IMPRESSION: Abnormal ventilation/perfusion lung scan. Intermediate   probability of pulmonary embolus.      < end of copied text >    VITALS:  T(C): 36.4 (23 @ 05:23), Max: 36.6 (23 @ 16:17)  T(F): 97.6 (23 @ 05:23), Max: 97.9 (23 @ 16:17)  HR: 83 (23 @ 09:45) (61 - 85)  BP: 132/72 (23 @ 05:23) (132/72 - 159/83)  BP(mean): --  ABP: --  ABP(mean): --  RR: 19 (23 @ 05:23) (19 - 20)  SpO2: 99% (23 @ 09:45) (96% - 99%)  CVP(mm Hg): --  CVP(cm H2O): --    Ins and Outs     23 @ 07:01  -  23 @ 07:00  --------------------------------------------------------  IN: 340 mL / OUT: 200 mL / NET: 140 mL    23 @ 07:01  -  23 @ 12:35  --------------------------------------------------------  IN: 400 mL / OUT: 300 mL / NET: 100 mL                I&O's Detail    19 Sep 2023 07:01  -  20 Sep 2023 07:00  --------------------------------------------------------  IN:    Oral Fluid: 340 mL  Total IN: 340 mL    OUT:    Voided (mL): 200 mL  Total OUT: 200 mL    Total NET: 140 mL      20 Sep 2023 07:01  -  20 Sep 2023 12:35  --------------------------------------------------------  IN:    Oral Fluid: 400 mL  Total IN: 400 mL    OUT:    Voided (mL): 300 mL  Total OUT: 300 mL    Total NET: 100 mL          Physical Examination:  GENERAL:               Alert, Oriented, No acute distress.    HEENT:                    Pupils equal, reactive to light.  Symmetric. No JVD, Moist MM  PULM:                     Bilateral air entry, Clear to auscultation bilaterally, no significant sputum production, No Rales, No Rhonchi, No Wheezing  CVS:                         S1, S2,  No Murmur  ABD:                        Soft, nondistended, nontender, normoactive bowel sounds,   EXT:                         No edema, nontender, No Cyanosis or Clubbing   Vascular:                Warm Extremities, Normal Capillary refill, Normal Distal Pulses  SKIN:                       Warm and well perfused, no rashes noted.   NEURO:                  Alert, oriented, interactive, nonfocal, follows commands  PSYC:                      Calm, + Insight.     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   Patient seen and examined at bedside, stable, NAD. Endorses left sided chest pain noticed while watching TV last night, took ASA with minimal improvement. 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 +mild diffuse headache, intermittent palpitations. Denies sob, cough, nausea, vomiting, abd pain, change vision.  (18 Sep 2023 13:38)      BRIEF HOSPITAL COURSE:   Pulmonary consult called to evaluate for VTE risk  patient states he came in for chest pain and sob, noted to have increased LE swelling at time  since being here he is feeling better  less cp, sob, palp, n/v  yesterday had VQ scan for evaluation of elevated ddimer        PAST MEDICAL & SURGICAL HISTORY:  Afib  Congestive heart failure (CHF)  CVA (cerebral vascular accident)  Hypertension, unspecified type  No significant past surgical history    Allergies    No Known Allergies    Intolerances      FAMILY HISTORY:    Social history: Social History:  smoke 3 cigars per day > 20 years. +1-2 EtOH several days/week. Denies illicit drug use. ambulates with RW. Lives in home with his children.     Fam hx - mother, father, , PMH unknown (18 Sep 2023 13:38)     Smoking: active cigar use     Review of Systems: as stated above    CONSTITUTIONAL: No fever, No chills, +fatigue  EYES: No eye pain, No visual disturbances, No discharge  ENMT:  No difficulty hearing, No tinnitus, No vertigo; No sinus or throat pain  NECK: No pain, No stiffness  RESPIRATORY: No Cough, No SOB, No Secretions  CARDIOVASCULAR: +chest pain, No palpitations, No dizziness, or ++ leg swelling  GASTROINTESTINAL: No abdominal or epigastric pain. No nausea, No vomiting, No hematemesis; No diarrhea, No constipation. No melena, No hematochezia.  GENITOURINARY: No dysuria, No frequency, No hematuria, No incontinence  NEUROLOGICAL: No headaches, No memory loss, No loss of strength, No numbness, No tremors  SKIN: No itching, No burning, No rashes, No lesions   MUSCULOSKELETAL: No joint pain or swelling; No muscle, back, No extremity pain  PSYCHIATRIC: No depression, No anxiety, No mood swings, No difficulty sleeping      Medications:  MEDICATIONS  (STANDING):  allopurinol 100 milliGRAM(s) Oral daily  apixaban 2.5 milliGRAM(s) Oral every 12 hours  aspirin enteric coated 81 milliGRAM(s) Oral daily  atorvastatin 40 milliGRAM(s) Oral at bedtime  budesonide 160 MICROgram(s)/formoterol 4.5 MICROgram(s) Inhaler 2 Puff(s) Inhalation two times a day  furosemide   Injectable 40 milliGRAM(s) IV Push daily  hydrALAZINE 25 milliGRAM(s) Oral every 8 hours  melatonin 6 milliGRAM(s) Oral at bedtime  metoprolol succinate ER 50 milliGRAM(s) Oral daily  multivitamin 1 Tablet(s) Oral daily  pantoprazole    Tablet 40 milliGRAM(s) Oral before breakfast  polyethylene glycol 3350 17 Gram(s) Oral daily  senna 2 Tablet(s) Oral at bedtime  tiotropium 2.5 MICROgram(s) Inhaler 2 Puff(s) Inhalation daily    MEDICATIONS  (PRN):  albuterol    90 MICROgram(s) HFA Inhaler 2 Puff(s) Inhalation every 6 hours PRN Shortness of Breath and/or Wheezing      Antibiotics History      Heme Medications   apixaban 2.5 milliGRAM(s) Oral every 12 hours, 23 @ 18:00  aspirin enteric coated 81 milliGRAM(s) Oral daily, 23 @ 14:30      GI Medications  pantoprazole    Tablet 40 milliGRAM(s) Oral before breakfast, 23 @ 14:31, Routine  polyethylene glycol 3350 17 Gram(s) Oral daily, 23 @ 14:39, Routine  senna 2 Tablet(s) Oral at bedtime, 23 @ 14:39, Routine        Home Medications:  Last Order Reconciliation Date: 23 @ 14:32 (Admission Reconciliation)  albuterol 90 mcg/inh inhalation aerosol: 2 puff(s) inhaled every 6 hours (23 @ 13:36)  allopurinol 100 mg oral tablet: 1 tab(s) orally once a day (23 @ 13:36)  apixaban 5 mg oral tablet: 1 tab(s) orally 2 times a day (23 @ 13:55)  aspirin 81 mg oral delayed release tablet: 1 tab(s) orally once a day (23 @ 14:50)  budesonide-formoterol 160 mcg-4.5 mcg/inh inhalation aerosol: 2 puff(s) inhaled 2 times a day (23 @ 13:36)  furosemide 40 mg oral tablet: 1 tab(s) orally 2 times a day (23 @ 13:36)  hydrALAZINE 25 mg oral tablet: 1 tab(s) orally every 8 hours (23 @ 13:36)  metoprolol succinate 50 mg oral tablet, extended release: 1 tab(s) orally once a day (23 @ 13:55)  Multiple Vitamins oral tablet: 1 tab(s) orally once a day (23 @ 13:31)  pantoprazole 40 mg oral delayed release tablet: 1 tab(s) orally once a day (before a meal) (23 @ 13:36)  tiotropium 2.5 mcg/inh inhalation aerosol: 2 puff(s) inhaled once a day (23 @ 13:36)      LABS:                        12.3   11.29 )-----------( 189      ( 20 Sep 2023 05:52 )             37.6         137  |  102  |  45<H>  ----------------------------<  221<H>  3.5   |  27  |  1.62<H>    Ca    8.6      20 Sep 2023 05:52  Mg     2.2           HIT ab --  @ 15:00  HIT ab EIA --  D Dimer -[id number]              RADIOLOGY  CXR:      CT:  < from: CT Chest No Cont (23 @ 09:27) >    ACC: [id number] EXAM:  CT CHEST   ORDERED BY: YUDELKA LARSEN     PROCEDURE DATE:  2023          INTERPRETATION:  HISTORY: Admitting Dxs: R07.9 CHEST PAIN, UNSPECIFIED.   Chest pain and possible consolidation versus effusion.    EXAMINATION: CT CHEST was performed without IV contrast.    COMPARISON: 2022.    FINDINGS:    AIRWAYS, LUNGS, PLEURA: Trachea and mainstem bronchi patent.   Small/moderate-sized left pleural effusion. Lingular and left basilar   parenchymal opacification.    MEDIASTINUM: Cardiomegaly. Trace pericardial effusion. Unchanged   calcifications near the aortic root. Dilated ascending thoracic aorta   measures 4.1 cm. Mediastinal lymph nodes decreased in size compared to   2022.    IMAGED ABDOMEN: Cholelithiasis. Partially imaged right renal lesion   measuring 6.2 cm with Hounsfield units of 25 (image 139, series 2).   Additional bilateral renal hypodense lesions may represent cysts.    SOFT TISSUES: Unremarkable.    BONES: Unremarkable.      IMPRESSION:.    Small/moderate size left pleural effusion.    Lingular and left basilar parenchymal opacification favored to represent atelectasis.    Partially imaged 6.2 cm right renal lesion; this lesion is indeterminate, but could represent a hemorrhagic/proteinaceous cyst.    --- End of Report ---      < end of copied text >    ECHO:  < from: TTE Echo Complete w/o Contrast w/ Doppler (23 @ 16:51) >  Summary:   1. Low normal global left ventricular systolic function.   2. Left ventricular ejection fraction, by visual estimation, is 50%.   3. Increased LV wall thickness.   4. Normal left ventricular internal cavity size.   5. Normal right ventricular size and function.   6. Mildly enlarged left atrium.   7. Mildly enlarged right atrium.   8. Mild thickening and calcification of the anterior and posterior mitral valve leaflets.   9. Moderately decreased mitral leaflet mobility.  10. 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 Moderate mitral valve stenosis (mean gradient  8 mmHg, HR not recorded, MVA 1.4 cm^2).  11. Mild mitral valve regurgitation.  12. Mild tricuspid regurgitation.  13. The aortic valve is not well visualized, appears heavily calcified.  14.Sclerotic aortic valve with normal opening.  15. Mild aortic regurgitation.  16. Large pleural effusion in the left lateral region.  17. Trivial pericardial effusion.  18. There is a significant pericardial fat pad present.    [id number] Kevin Hong MD Electronically signed on   2023 at 5:50:28 PM    < end of copied text >    US LE Duplex  < from: US Duplex Venous Lower Ext Complete, Bilateral (23 @ 11:45) >    IMPRESSION:  No evidence of deep venous thrombosis in either lower extremity.    < end of copied text >    NM VQ Scan  < from: NM Pulmonary Ventilation/Perfusion Scan (23 @ 15:25) >    IMPRESSION: Abnormal ventilation/perfusion lung scan. Intermediate   probability of pulmonary embolus.      < end of copied text >    VITALS:  T(C): 36.4 (23 @ 05:23), Max: 36.6 (23 @ 16:17)  T(F): 97.6 (23 @ 05:23), Max: 97.9 (23 @ 16:17)  HR: 83 (23 @ 09:45) (61 - 85)  BP: 132/72 (23 @ 05:23) (132/72 - 159/83)  BP(mean): --  ABP: --  ABP(mean): --  RR: 19 (23 @ 05:23) (19 - 20)  SpO2: 99% (23 @ 09:45) (96% - 99%)  CVP(mm Hg): --  CVP(cm H2O): --    Ins and Outs     23 @ 07:01  -  23 @ 07:00  --------------------------------------------------------  IN: 340 mL / OUT: 200 mL / NET: 140 mL    23 @ 07:01  -  23 @ 12:35  --------------------------------------------------------  IN: 400 mL / OUT: 300 mL / NET: 100 mL                I&O's Detail    19 Sep 2023 07:01  -  20 Sep 2023 07:00  --------------------------------------------------------  IN:    Oral Fluid: 340 mL  Total IN: 340 mL    OUT:    Voided (mL): 200 mL  Total OUT: 200 mL    Total NET: 140 mL      20 Sep 2023 07:01  -  20 Sep 2023 12:35  --------------------------------------------------------  IN:    Oral Fluid: 400 mL  Total IN: 400 mL    OUT:    Voided (mL): 300 mL  Total OUT: 300 mL    Total NET: 100 mL          Physical Examination:  GENERAL:               Alert, Oriented, No acute distress.    HEENT:                    No JVD, dry MM  PULM:                     Bilateral air entry, Clear to auscultation bilaterally, no significant sputum production, +Rales, No Rhonchi, No Wheezing  CVS:                         S1, S2,  +Murmur  ABD:                        Soft, nondistended, nontender, normoactive bowel sounds,   EXT:                         +edema, nontender, No Cyanosis or Clubbing   Vascular:                Warm Extremities,   NEURO:                  Alert, oriented, interactive, nonfocal, follows commands  PSYC:                      Calm, + Insight.

## 2023-09-20 NOTE — PROGRESS NOTE ADULT - SUBJECTIVE AND OBJECTIVE BOX
[facility]  [id number]      Chief Complaint: CHF/Adenovirus/Atrial fibrillation/Possible Pulmonary embolism     Interval History: The patient reports that his breathing is improving.     Tele: atrial fibrillation 70s BPM, NSVT      Current meds:   albuterol    90 MICROgram(s) HFA Inhaler 2 Puff(s) Inhalation every 6 hours PRN  allopurinol 100 milliGRAM(s) Oral daily  apixaban 2.5 milliGRAM(s) Oral every 12 hours  aspirin enteric coated 81 milliGRAM(s) Oral daily  atorvastatin 40 milliGRAM(s) Oral at bedtime  budesonide 160 MICROgram(s)/formoterol 4.5 MICROgram(s) Inhaler 2 Puff(s) Inhalation two times a day  furosemide   Injectable 40 milliGRAM(s) IV Push daily  hydrALAZINE 25 milliGRAM(s) Oral every 8 hours  melatonin 6 milliGRAM(s) Oral at bedtime  metoprolol succinate ER 50 milliGRAM(s) Oral daily  multivitamin 1 Tablet(s) Oral daily  pantoprazole    Tablet 40 milliGRAM(s) Oral before breakfast  polyethylene glycol 3350 17 Gram(s) Oral daily  senna 2 Tablet(s) Oral at bedtime  tiotropium 2.5 MICROgram(s) Inhaler 2 Puff(s) Inhalation daily      Objective:     Vital Signs:   T(C): 36.4 (09-20-23 @ 05:23), Max: 36.6 (09-19-23 @ 16:17)  HR: 83 (09-20-23 @ 09:45) (61 - 85)  BP: 132/72 (09-20-23 @ 05:23) (132/72 - 159/83)  RR: 19 (09-20-23 @ 05:23) (19 - 20)  SpO2: 99% (09-20-23 @ 09:45) (96% - 99%)      Physical Exam:   General: no acute distress  HEENT: sclera anicteric  Neck: supple  CVS: JVP ~ 9 cm H20, irregularly irregular, s1, s2  Chest: decreased breath sounds   Extremities: no lower extremity edema b/l  Neuro: awake, alert & oriented  Psych: normal affect      Labs:   20 Sep 2023 05:52    137    |  102    |  45     ----------------------------<  221    3.5     |  27     |  1.62     Ca    8.6        20 Sep 2023 05:52  Mg     2.2       20 Sep 2023 05:52    TPro  7.6    /  Alb  3.0    /  TBili  0.9    /  DBili  x      /  AST  19     /  ALT  15     /  AlkPhos  68     18 Sep 2023 12:07                          12.3   11.29 )-----------( 189      ( 20 Sep 2023 05:52 )             37.6             TTE (12/17/22):  LVEF 45-50%, mild LVH, regional wall motion abnormalities  Moderate MR, Mild-moderate TR    TTE (9/18/23):   1. Low normal global left ventricular systolic function.   2. Left ventricular ejection fraction, by visual estimation, is 50%.   3. Increased LV wall thickness.   4. Normal left ventricular internal cavity size.   5. Normal right ventricular size and function.   6. Mildly enlarged left atrium.   7. Mildly enlarged right atrium.   8. Mild thickening and calcification of the anterior and posterior   mitral valve leaflets.   9. Moderately decreased mitral leaflet mobility.  10. 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 Moderate mitral valve stenosis (mean gradient 8 mmHg, HR not recorded, MVA 1.4 cm^2).  11. Mild mitral valve regurgitation.  12. Mild tricuspid regurgitation.  13. The aortic valve is not well visualized, appears heavily calcified.  14. Sclerotic aortic valve with normal opening.  15. Mild aortic regurgitation.  16. Large pleural effusion in the left lateral region.  17. Trivial pericardial effusion.  18. There is a significant pericardial fat pad present.      ECG (9/18/23): atrial fibrillation, lateral ST depressions (similar to prior ECG)

## 2023-09-20 NOTE — PROGRESS NOTE ADULT - ASSESSMENT
Assessment:  Jaylon Antony is an 89 year old man with past medical history of Atrial fibrillation (on Eliquis), HFrEF (LVEF 25-30% in 2019), Hypertension, Chronic kidney disease and history of CVA presents with acute onset of chest discomfort and shortness of breath, concerning for acute on chronic systolic heart failure exacerbation, also with adenovirus and possible pulmonary embolism.    ECG consistent with atrial fibrillation and lateral ST depressions which are generally similar to prior ECG. Troponin negative x 2. Pro BNP elevated but less than prior hospitalization. Echo consistent with low normal LVEF 50%, moderate mitral valve stenosis and elevated filling pressures.     Recommendations:  [] Chest discomfort: Leg US negative for DVT, however VQ scan with intermediate probability of PE, follow up with Pulmonary if they recommend anticoagulation dose adjustment for PE.  [] HF: LVEF 50%, moderate mitral stenosis. Continue IV diuresis. Continue guideline directed medical therapy; Metoprolol succinate 50 mg daily. Moderate mitral stenosis can be managed with outpatient surveillance echo imaging.   [] Atrial fibrillation: Rate controlled, continue beta blocker. Continue Eliquis for stroke prophylaxis.   [] Adenovirus: Supportive care per primary team    Will sign out this case to cardiologist to follow along tomorrow.    Kevin Hong MD  Cardiology

## 2023-09-20 NOTE — PROGRESS NOTE ADULT - ASSESSMENT
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 Hong- VQ scan reviewed (markedly elevated D dimer)  -CT chest w/o contrast:      Small/moderate size left pleural effusion.  Lingular and left basilar parenchymal opacification favored to represent  atelectasis. Partially imaged 6.2 cm right renal lesion; this lesion is indeterminate, but could represent a hemorrhagic/proteinaceous cyst.  -Continue guideline directed medical therapy, dopplers negative    #Adenovirus  - C/w contact and respiratory droplet precautions  - Supportive care    #COPD, chronic, stable  -Per patient, not on home O2. 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  - CONSULTED pulm and cardiology  - troponin neg x2 , monitor EKG- afib with lat ST depressions   -Cardiac monitoring   -ASA 81mg daily, Lipitor 40mg qhs, continue eliquis  -HbA1C 5.8, TSH pending, LDL 61  -C/w lasix 40mg IV daily, toprol XL 50mg daily, hydralazine 25mg q 8hrs  - VQ scan reviewed; patient is on eliquis 2.5mg BID for afib; will see what pulm recommends (markedly elevated D dimer); cannot have CTA due to CKD  -CT chest w/o contrast:  Small/moderate size left pleural effusion.  Lingular and left basilar parenchymal opacification favored to represent  atelectasis. Partially imaged 6.2 cm right renal lesion; this lesion is indeterminate, but could represent a hemorrhagic/proteinaceous cyst.  -Continue guideline directed medical therapy, dopplers negative    #COPD, chronic, stable  -Per patient, not on home O2. At bedside comfortable on 4L NC, O2 sat 96%   -Continue supplemental oxygen with nasal canula to maintain SpO2 > 88%  -Continue home meds - spiriva qd, symbicort bid, albuterol q6h prn    #CKD3   Hypokalemia  -monitor electrolytes and replete as needed  -avoid nephrotoxins, monitor BMP    DVT PPX: Eliquis  AM labs, PT eval   Code Status: Full Code  HCP: Daughter Frida - patient prefers to update family

## 2023-09-21 NOTE — PHYSICAL THERAPY INITIAL EVALUATION ADULT - PERTINENT HX OF CURRENT PROBLEM, REHAB EVAL
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 Patient seen and examined at bedside, stable, NAD. Endorses left sided chest pain noticed while watching TV last night, took ASA with minimal improvement. 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 +mild diffuse headache, intermittent palpitations. Denies sob, cough, nausea, vomiting, abd pain, change vision.

## 2023-09-21 NOTE — PROGRESS NOTE ADULT - SUBJECTIVE AND OBJECTIVE BOX
Patient is a 89y old  Male who presents with a chief complaint of chest pain r/o ACS (20 Sep 2023 12:33)    Patient seen and examined at bedside.  S: reports some cp/sob overnight when walking to the bathroom., resolved with rest. Denies palpitations.     ALLERGIES:  No Known Allergies    MEDICATIONS:  albuterol    90 MICROgram(s) HFA Inhaler 2 Puff(s) Inhalation every 6 hours PRN  allopurinol 100 milliGRAM(s) Oral daily  apixaban 2.5 milliGRAM(s) Oral every 12 hours  aspirin enteric coated 81 milliGRAM(s) Oral daily  atorvastatin 40 milliGRAM(s) Oral at bedtime  budesonide 160 MICROgram(s)/formoterol 4.5 MICROgram(s) Inhaler 2 Puff(s) Inhalation two times a day  furosemide   Injectable 40 milliGRAM(s) IV Push daily  hydrALAZINE 25 milliGRAM(s) Oral every 8 hours  melatonin 6 milliGRAM(s) Oral at bedtime  metoprolol succinate ER 50 milliGRAM(s) Oral daily  multivitamin 1 Tablet(s) Oral daily  pantoprazole    Tablet 40 milliGRAM(s) Oral before breakfast  polyethylene glycol 3350 17 Gram(s) Oral daily  senna 2 Tablet(s) Oral at bedtime  tiotropium 2.5 MICROgram(s) Inhaler 2 Puff(s) Inhalation daily    Vital Signs Last 24 Hrs  T(F): 98.4 (21 Sep 2023 05:15), Max: 98.6 (20 Sep 2023 21:06)  HR: 87 (21 Sep 2023 08:25) (74 - 87)  BP: 137/71 (21 Sep 2023 05:15) (128/71 - 158/78)  RR: 18 (21 Sep 2023 11:18) (18 - 23)  SpO2: 95% (21 Sep 2023 11:18) (94% - 97%)  I&O's Summary    20 Sep 2023 07:01  -  21 Sep 2023 07:00  --------------------------------------------------------  IN: 640 mL / OUT: 600 mL / NET: 40 mL    21 Sep 2023 07:01  -  21 Sep 2023 11:49  --------------------------------------------------------  IN: 500 mL / OUT: 400 mL / NET: 100 mL        PHYSICAL EXAM:  General: NAD, A/O x 3  ENT: MMM  Lungs: Clear to auscultation bilaterally (Anteriorly)   Cardio: RR, S1/S2, No murmurs  Abdomen: Soft, NT/ND, Normal active Bowel Sounds   Extremities: No cyanosis, No edema    LABS:                        12.6   9.75  )-----------( 222      ( 21 Sep 2023 07:12 )             38.2     09-21    136  |  99  |  48  ----------------------------<  114  3.7   |  29  |  1.57    Ca    8.8      21 Sep 2023 07:12  Mg     2.1     09-21    TPro  7.6  /  Alb  3.0  /  TBili  0.9  /  DBili  x   /  AST  19  /  ALT  15  /  AlkPhos  68  09-18          CARDIAC MARKERS ( 18 Sep 2023 15:00 )  x     / 29.6 ng/L / x     / x     / x      CARDIAC MARKERS ( 18 Sep 2023 12:07 )  x     / 24.5 ng/L / x     / x     / x          09-19 Chol 128 mg/dL LDL -- HDL 56 mg/dL Trig 54 mg/dL                  Urinalysis Basic - ( 21 Sep 2023 07:12 )    Color: x / Appearance: x / SG: x / pH: x  Gluc: 114 mg/dL / Ketone: x  / Bili: x / Urobili: x   Blood: x / Protein: x / Nitrite: x   Leuk Esterase: x / RBC: x / WBC x   Sq Epi: x / Non Sq Epi: x / Bacteria: x            RADIOLOGY & ADDITIONAL TESTS:    < from: TTE Echo Complete w/o Contrast w/ Doppler (09.18.23 @ 16:51) >   1. Low normal global left ventricular systolic function.   2. Left ventricular ejection fraction, by visual estimation, is 50%.   3. Increased LV wall thickness.   4. Normal left ventricular internal cavity size.   5. Normal right ventricular size and function.   6. Mildly enlarged left atrium.   7. Mildly enlarged right atrium.   8. Mild thickening and calcification of the anterior and posterior   mitral valve leaflets.   9. Moderately decreased mitral leaflet mobility.  10. 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  11. Mild mitral valve regurgitation.  12. Mild tricuspid regurgitation.  13. The aortic valve is not well visualized, appears heavily calcified.  14.Sclerotic aortic valve with normal opening.  15. Mild aortic regurgitation.  16. Large pleural effusion in the left lateral region.  17. Trivial pericardial effusion.  18. There is a significant pericardial fat pad present.    < from: NM Pulmonary Ventilation/Perfusion Scan (09.19.23 @ 15:25) >  IMPRESSION: Abnormal ventilation/perfusion lung scan. Intermediate   probability of pulmonary embolus.    < from: US Duplex Venous Lower Ext Complete, Bilateral (09.19.23 @ 11:45) >  No evidence of deep venous thrombosis in either lower extremity.    < from: CT Chest No Cont (09.19.23 @ 09:27) >  Small/moderate size left pleural effusion.  Lingular and left basilar parenchymal opacification favored to represent   atelectasis.  Partially imaged 6.2 cm right renal lesion; this lesion is indeterminate,   but could represent a hemorrhagic/proteinaceous cyst.      Care Discussed with Consultants/Other Providers:   DENNISE Cadena discussed case and plan with Dr. Aguilar

## 2023-09-21 NOTE — PROGRESS NOTE ADULT - ASSESSMENT
Physical Examination:  GENERAL:               Alert, Oriented, No acute distress.    HEENT:                    No JVD, dry MM  PULM:                     Bilateral air entry, Clear to auscultation bilaterally, no Rales, No Rhonchi, No Wheezing  CVS:                         S1, S2,  +Murmur  ABD:                        Soft, nondistended, nontender, normoactive bowel sounds,   EXT:                         +edema, nontender, No Cyanosis or Clubbing   Vascular:                Warm Extremities,   NEURO:                  Alert, oriented, interactive, nonfocal, follows commands  PSYC:                      Calm, + Insight.    Assessment  Acute on chronic  Diastolic CHF     improving with Diuresis  Based on CT Small/moderate size left pleural effusion too small to drain, and may pose increased risk than benefit  Elevated DDimer -  is non specific      no evidence of VTE - VQ scan is intermediate probability and LE dopplers negative   Afib on eliquis  Cigar smoker, at risk for copd.     Plan  Imaging reviewed doubt VTE  clinically patient is improved with diuresis as now on room air oxygen  Echo with out reported pulmonary hypertension, and has a normal RV function and size   Patient does complain of orthopnea that does suggest cardiac component.     Cont. diuresis and maintain euvolemic volume status  Cont. Symbicort and spiriva  low suspicion of VTE at this time will continue eliquis at current dose.   discussed with daughter at bedside

## 2023-09-21 NOTE — PROGRESS NOTE ADULT - ASSESSMENT
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     *Acute hypoxic respiratory failure possibly due to Adenovirus  *Chest Pain r/o ACS, PE  *Acute hypoxic respiratory failure due to Acute/Chronic combined CHF (EF 50%)   *Chronic Afib on long term anticoagulation  - EKG- Afib with lat ST depressions   -Trop x2 neg  -CT chest w/o contrast:  Small/moderate size left pleural effusion.  Lingular and left basilar parenchymal opacification favored to represent  atelectasis. Partially imaged 6.2 cm right renal lesion; this lesion is indeterminate, but could represent a hemorrhagic/proteinaceous cyst.  -Tele Monitoring   - Appreciate Cards recs   - Appreciate Pulmonary recs  - V/Q scan findings noted- is on Eliquis 2.5mg BID for Afib. Pulm w. lower suspicion for PE, will maintain at this dose (unable to have CTA 2/2 CKD), b/l LE u/s neg  - HbA1C 5.8, f/up TSH, Lipid panel   - Cont ASA, Statin   - Cont Diuresis- IV Lasix  - Check Spo2 on RA  - PT    *Adenovirus   - C/w contact and respiratory Droplet precautions  - Supportive care    *HTN  -Cont Toprol XL 50mg daily, hydralazine 25mg q 8hrs    *COPD  -Chronic, stable  -Per patient, not on home O2  -Continue supplemental oxygen with nasal canula to maintain SpO2 > 88% as needed  -Continue home meds - Spiriva qd, Symbicort bid, albuterol q6h prn    *CKD3   -avoid nephrotoxins, monitor BMP    *Renal Cyst- Incidental finding   Noted on CT as above  Renal u/s pending to better eval     *Hypokalemia- resolved  -monitor electrolytes and replete as needed    DVT ppx:   On Eliquis    Code Status: Full Code  HCP: Daughter Frida - patient prefers to update family.

## 2023-09-21 NOTE — PROGRESS NOTE ADULT - SUBJECTIVE AND OBJECTIVE BOX
Follow-up Pulmonary Progress Note  Chief Complaint : Chest pain    Awake and alert. States breathing is much improved. No dyspnea at rest. but has some shortness of breath with exertion    Allergies :No Known Allergies    PAST MEDICAL & SURGICAL HISTORY:  Afib    Congestive heart failure (CHF)    CVA (cerebral vascular accident)    Hypertension, unspecified type    No significant past surgical history    Medications:  MEDICATIONS  (STANDING):  allopurinol 100 milliGRAM(s) Oral daily  apixaban 2.5 milliGRAM(s) Oral every 12 hours  aspirin enteric coated 81 milliGRAM(s) Oral daily  atorvastatin 40 milliGRAM(s) Oral at bedtime  budesonide 160 MICROgram(s)/formoterol 4.5 MICROgram(s) Inhaler 2 Puff(s) Inhalation two times a day  furosemide   Injectable 40 milliGRAM(s) IV Push daily  hydrALAZINE 25 milliGRAM(s) Oral every 8 hours  melatonin 6 milliGRAM(s) Oral at bedtime  metoprolol succinate ER 50 milliGRAM(s) Oral daily  multivitamin 1 Tablet(s) Oral daily  pantoprazole    Tablet 40 milliGRAM(s) Oral before breakfast  polyethylene glycol 3350 17 Gram(s) Oral daily  senna 2 Tablet(s) Oral at bedtime  tiotropium 2.5 MICROgram(s) Inhaler 2 Puff(s) Inhalation daily    MEDICATIONS  (PRN):  albuterol    90 MICROgram(s) HFA Inhaler 2 Puff(s) Inhalation every 6 hours PRN Shortness of Breath and/or Wheezing      Antibiotics History      Heme Medications   apixaban 2.5 milliGRAM(s) Oral every 12 hours, 09-18-23 @ 18:00  aspirin enteric coated 81 milliGRAM(s) Oral daily, 09-18-23 @ 14:30      GI Medications  pantoprazole    Tablet 40 milliGRAM(s) Oral before breakfast, 09-18-23 @ 14:31, Routine  polyethylene glycol 3350 17 Gram(s) Oral daily, 09-18-23 @ 14:39, Routine  senna 2 Tablet(s) Oral at bedtime, 09-18-23 @ 14:39, Routine        LABS:                        12.6   9.75  )-----------( 222      ( 21 Sep 2023 07:12 )             38.2     09-21    136  |  99  |  48<H>  ----------------------------<  114<H>  3.7   |  29  |  1.57<H>    Ca    8.8      21 Sep 2023 07:12  Mg     2.1     09-21      HIT ab -- 09-18 @ 15:00  HIT ab EIA --  D Dimer -[id number]            Urinalysis Basic - ( 21 Sep 2023 07:12 )    Color: x / Appearance: x / SG: x / pH: x  Gluc: 114 mg/dL / Ketone: x  / Bili: x / Urobili: x   Blood: x / Protein: x / Nitrite: x   Leuk Esterase: x / RBC: x / WBC x   Sq Epi: x / Non Sq Epi: x / Bacteria: x    CULTURES: (if applicable)    Rapid RVP Result: Detected (09-18-23 @ 19:50)    VITALS:  T(C): 36.9 (09-21-23 @ 05:15), Max: 37 (09-20-23 @ 21:06)  T(F): 98.4 (09-21-23 @ 05:15), Max: 98.6 (09-20-23 @ 21:06)  HR: 87 (09-21-23 @ 08:25) (74 - 87)  BP: 137/71 (09-21-23 @ 05:15) (128/71 - 158/78)  BP(mean): --  ABP: --  ABP(mean): --  RR: 18 (09-21-23 @ 11:18) (18 - 23)  SpO2: 95% (09-21-23 @ 11:18) (94% - 97%)  CVP(mm Hg): --  CVP(cm H2O): --    Ins and Outs     09-20-23 @ 07:01  -  09-21-23 @ 07:00  --------------------------------------------------------  IN: 640 mL / OUT: 600 mL / NET: 40 mL    09-21-23 @ 07:01  -  09-21-23 @ 12:43  --------------------------------------------------------  IN: 500 mL / OUT: 400 mL / NET: 100 mL    I&O's Detail    20 Sep 2023 07:01  -  21 Sep 2023 07:00  --------------------------------------------------------  IN:    Oral Fluid: 640 mL  Total IN: 640 mL    OUT:    Voided (mL): 600 mL  Total OUT: 600 mL    Total NET: 40 mL    21 Sep 2023 07:01  -  21 Sep 2023 12:43  --------------------------------------------------------  IN:    Oral Fluid: 500 mL  Total IN: 500 mL    OUT:    Voided (mL): 400 mL  Total OUT: 400 mL    Total NET: 100 mL

## 2023-09-21 NOTE — PROGRESS NOTE ADULT - NS ATTEND AMEND GEN_ALL_CORE FT
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     #Acute hypoxic respiratory failure   #Adenovirus  #Acute/Chronic combined CHF (EF 50%)   - acute resp failure multifactorial- due to CHF exacerbation and adenovirus  - current on RA, saturating well  - will change lasix IV to PO for tomorrow  - low suspicion for PE as per pulm, continue current AC  - PT evaluated- saturated well with ambulation 94%  - pulm recs appreciated 89yo male with PMH CVA, 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     #Acute hypoxic respiratory failure   #Adenovirus  #Acute/Chronic combined CHF (EF 50%)   - acute resp failure multifactorial- due to CHF exacerbation and adenovirus  - current on RA, saturating well  - will change lasix IV to PO for tomorrow  - low suspicion for PE as per pulm, continue current AC  - PT evaluated- saturated well with ambulation 94%  - pulm recs appreciated    #renal cyst  - follow up renal US 88yo male with PMH CVA, 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     #Acute hypoxic respiratory failure   #Adenovirus  #Acute/Chronic combined CHF (EF 50%)   - acute resp failure multifactorial- due to CHF exacerbation and adenovirus  - current on RA, saturating well  - will change lasix IV to PO for tomorrow  - low suspicion for PE as per pulm, continue current AC  - PT evaluated- saturated well with ambulation 94%  - follow up CXR in the AM  - pulm recs appreciated    #renal cyst  - follow up renal US

## 2023-09-22 NOTE — PROGRESS NOTE ADULT - SUBJECTIVE AND OBJECTIVE BOX
Follow up for :   chf adenovirus  SUBJ: some mild dyspnea, cough  no c/p    PMH  Afib    Congestive heart failure (CHF)    CVA (cerebral vascular accident)    Hypertension, unspecified type        MEDICATIONS  (STANDING):  allopurinol 100 milliGRAM(s) Oral daily  apixaban 2.5 milliGRAM(s) Oral every 12 hours  aspirin enteric coated 81 milliGRAM(s) Oral daily  atorvastatin 40 milliGRAM(s) Oral at bedtime  budesonide 160 MICROgram(s)/formoterol 4.5 MICROgram(s) Inhaler 2 Puff(s) Inhalation two times a day  furosemide    Tablet 40 milliGRAM(s) Oral two times a day  hydrALAZINE 25 milliGRAM(s) Oral every 8 hours  melatonin 6 milliGRAM(s) Oral at bedtime  metoprolol succinate ER 50 milliGRAM(s) Oral daily  multivitamin 1 Tablet(s) Oral daily  pantoprazole    Tablet 40 milliGRAM(s) Oral before breakfast  polyethylene glycol 3350 17 Gram(s) Oral daily  senna 2 Tablet(s) Oral at bedtime  tiotropium 2.5 MICROgram(s) Inhaler 2 Puff(s) Inhalation daily    MEDICATIONS  (PRN):  albuterol    90 MICROgram(s) HFA Inhaler 2 Puff(s) Inhalation every 6 hours PRN Shortness of Breath and/or Wheezing        PHYSICAL EXAM:  Vital Signs Last 24 Hrs  T(C): 36.3 (22 Sep 2023 04:53), Max: 36.9 (21 Sep 2023 21:37)  T(F): 97.4 (22 Sep 2023 04:53), Max: 98.4 (21 Sep 2023 21:37)  HR: 82 (22 Sep 2023 07:45) (73 - 82)  BP: 154/77 (22 Sep 2023 04:53) (139/74 - 154/77)  BP(mean): --  RR: 17 (22 Sep 2023 04:53) (17 - 18)  SpO2: 97% (22 Sep 2023 07:45) (94% - 99%)    Parameters below as of 22 Sep 2023 07:45  Patient On (Oxygen Delivery Method): room air        GENERAL: NAD, well-groomed, well-developed  HEAD:  Atraumatic, Normocephalic  EYES:  conjunctiva and sclera clear  ENT: Moist mucous membranes,  NECK: Supple, No JVD, no bruits  CHEST/LUNG: Clear to auscultation bilaterally; No rales, rhonchi, wheezing, or rubs  HEART: Regular rate and rhythm; No murmurs, rubs, or gallops PMI non displaced.  ABDOMEN: Soft, Nontender, Nondistended; Bowel sounds present  EXTREMITIES:   No clubbing, cyanosis; mild edema present  SKIN: No rashes or lesions  NERVOUS SYSTEM:  Alert       TELEMETRY:  5 beats vt reported    ECG:  < from: 12 Lead ECG (23 @ 12:11) >    Ventricular Rate 94 BPM    Atrial Rate 52 BPM    QRS Duration 94 ms    Q-T Interval 388 ms    QTC Calculation(Bazett) 485 ms    R Axis 3 degrees    T Axis -48 degrees    Diagnosis Line Atrial fibrillation  T wave abnormality, consider lateral ischemia  Abnormal ECG  When compared with ECG of 18-DEC-2022 08:16,  Lateral ST depressions are now present  Confirmed by Derek Hong ([id number]) on 2023 7:05:58 AM    < end of copied text >      LABS:                        11.8   8.89  )-----------( 232      ( 22 Sep 2023 07:20 )             35.8         136  |  99  |  49<H>  ----------------------------<  112<H>  3.8   |  29  |  1.50<H>    Ca    8.7      22 Sep 2023 07:20  Mg     2.1                     I&O's Summary    21 Sep 2023 07:  -  22 Sep 2023 07:00  --------------------------------------------------------  IN: 1000 mL / OUT: 400 mL / NET: 600 mL    22 Sep 2023 07:  -  22 Sep 2023 13:11  --------------------------------------------------------  IN: 400 mL / OUT: 350 mL / NET: 50 mL        ECHO:    < from: TTE Echo Complete w/o Contrast w/ Doppler (23 @ 16:51) >  ACC: [id number] EXAM:  ECHO TTE WO CON COMP W DOPP                          PROCEDURE DATE:  2023          INTERPRETATION:  TRANSTHORACIC ECHOCARDIOGRAM REPORT        Patient Name:   RAD SINGLETON Patient Location: [address] Rec #:[id number]            Accession #:      [id number]  Account #:      [id number]             Height:           66.9 in 170.0 cm  YOB: 1934          Weight:           171.1 lb 77.60 kg  Patient Age:    89 years           BSA:              1.89 m²  Patient Gender: M                  BP:               171/91 mmHg      Date of Exam:        2023 4:51:53 PM  Sonographer:         HARIKA  Referring Physician: RICK    Procedure:     2D Echo/Doppler/Color Doppler Complete.  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        2D AND M-MODE MEASUREMENTS (normal ranges within parentheses):  Left Ventricle:                  Normal   Aorta/Left Atrium:               Normal  IVSd (2D):              1.49 cm (0.7-1.1) Aortic Root (Mmode): 3.00 cm   (2.4-3.7)  LVPWd (2D):             1.28 cm (0.7-1.1) AoV Cusp Separation: 1.79 cm   (1.5-2.6)  LVIDd (2D):             4.31 cm (3.4-5.7) Left Atrium (Mmode): 5.50 cm   (1.9-4.0)  LVIDs (2D):             3.17 cm  LV FS (2D):             26.5 %   (>25%)  Relative Wall Thickness  0.59    (<0.42)    LV DIASTOLIC FUNCTION:  MV Peak E: 1.33 m/s Decel Time: 1036 msec    SPECTRAL DOPPLER ANALYSIS (where applicable):  Mitral Valve:  MV Max Ron: 1.51 m/s MV P1/2 Time: 300.54 msec                       MV Area, PHT: 0.73 cm²    Aortic Valve: AoV Max Ron: 1.87 m/s AoV Peak P.9 mmHg AoV Mean P.9 mmHg    LVOT Vmax: 0.51 m/s LVOT VTI: 0.093 m LVOT Diameter: 2.46 cm      Ao VTI: 0.261  Aortic Insufficiency:  AI Half-time:  330 msec  AI Decel Rate: 1.70 m/s²    Tricuspid Valve and PA/RV Systolic Pressure: TR Max Velocity: 2.59 m/s RA   Pressure: 8 mmHg RVSP/PASP: 34.8 mmHg      PHYSICIAN INTERPRETATION:  Left Ventricle: The left ventricular internal cavity size is normal. Left   ventricular wall thickness is increased.  Global LV systolic function was low normal. Left ventricular ejection   fraction, by visual estimation, is 50%.  Right Ventricle: Normal right ventricular size and function.  Left Atrium: Mildly enlarged left atrium.  Right Atrium: Mildly enlarged right atrium.  Pericardium: Trivial pericardial effusion is present. The pericardial   effusion is localized near the right atrium. There is a significant   pericardial fat pad present. There is a large pleural effusion in the   left lateral region.  Mitral Valve: Mild thickening and calcification of the anterior and   posterior mitral valve leaflets. Mobility is moderately decreased for   both leaflets. Mild mitral valve regurgitation is seen. The mitral valve   has a rheumatic appearance. Moderate mitral valve stenosis (mean gradient    8 mmHg, HR not recorded, MVA 1.4 cm^2).  Tricuspid Valve: The tricuspid valve is not well visualized. Mild   tricuspid regurgitation is visualized.  Aortic Valve: Sclerotic aortic valve with normal opening. Mild aortic   valve regurgitation is seen. The aortic valve is not well visualized,   appears heavily calcified.  Pulmonic Valve: The pulmonic valve was not well visualized.  Aorta: Aortic root measured at Sinus of Valsalva is normal.  Venous: The inferior vena cava was dilated, with respiratory size   variation greater than 50%.      Summary:   1. Low normal global left ventricular systolic function.   2. Left ventricular ejection fraction, by visual estimation, is 50%.   3. Increased LV wall thickness.   4. Normal left ventricular internal cavity size.   5. Normal right ventricular size and function.   6. Mildly enlarged left atrium.   7. Mildly enlarged right atrium.   8. Mild thickening and calcification of the anterior and posterior   mitral valve leaflets.   9. Moderately decreased mitral leaflet mobility.  10. 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  11. Mild mitral valve regurgitation.  12. Mild tricuspid regurgitation.  13. The aortic valve is not well visualized, appears heavily calcified.  14.Sclerotic aortic valve with normal opening.  15. Mild aortic regurgitation.  16. Large pleural effusion in the left lateral region.  17. Trivial pericardial effusion.  18. There is a significant pericardial fat pad present.    [id number] Kevin Hong MD Electronically signed on   2023 at 5:50:28 PM    < end of copied text >

## 2023-09-22 NOTE — DISCHARGE NOTE PROVIDER - NSDCMRMEDTOKEN_GEN_ALL_CORE_FT
albuterol 90 mcg/inh inhalation aerosol: 2 puff(s) inhaled every 6 hours  allopurinol 100 mg oral tablet: 1 tab(s) orally once a day  apixaban 5 mg oral tablet: 1 tab(s) orally 2 times a day  aspirin 81 mg oral delayed release tablet: 1 tab(s) orally once a day  budesonide-formoterol 160 mcg-4.5 mcg/inh inhalation aerosol: 2 puff(s) inhaled 2 times a day  dapagliflozin 10 mg oral tablet: 1 tab(s) orally every 24 hours  furosemide 40 mg oral tablet: 1 tab(s) orally 2 times a day  hydrALAZINE 25 mg oral tablet: 1 tab(s) orally every 8 hours  metoprolol succinate 50 mg oral tablet, extended release: 1 tab(s) orally once a day  Multiple Vitamins oral tablet: 1 tab(s) orally once a day  pantoprazole 40 mg oral delayed release tablet: 1 tab(s) orally once a day (before a meal)  tiotropium 2.5 mcg/inh inhalation aerosol: 2 puff(s) inhaled once a day   albuterol 90 mcg/inh inhalation aerosol: 2 puff(s) inhaled every 6 hours  allopurinol 100 mg oral tablet: 1 tab(s) orally once a day  apixaban 2.5 mg oral tablet: 1 tab(s) orally every 12 hours  aspirin 81 mg oral delayed release tablet: 1 tab(s) orally once a day  atorvastatin 40 mg oral tablet: 1 tab(s) orally once a day (at bedtime)  budesonide-formoterol 160 mcg-4.5 mcg/inh inhalation aerosol: 2 puff(s) inhaled 2 times a day  dapagliflozin 10 mg oral tablet: 1 tab(s) orally every 24 hours  furosemide 40 mg oral tablet: 1 tab(s) orally 2 times a day  hydrALAZINE 25 mg oral tablet: 1 tab(s) orally every 8 hours  metoprolol succinate 50 mg oral tablet, extended release: 1 tab(s) orally once a day  Multiple Vitamins oral tablet: 1 tab(s) orally once a day  pantoprazole 40 mg oral delayed release tablet: 1 tab(s) orally once a day (before a meal)  tiotropium 2.5 mcg/inh inhalation aerosol: 2 puff(s) inhaled once a day

## 2023-09-22 NOTE — DISCHARGE NOTE PROVIDER - NSDCCPCAREPLAN_GEN_ALL_CORE_FT
PRINCIPAL DISCHARGE DIAGNOSIS  Diagnosis: Chest pain  Assessment and Plan of Treatment:       SECONDARY DISCHARGE DIAGNOSES  Diagnosis: Acute on chronic combined systolic and diastolic congestive heart failure  Assessment and Plan of Treatment:      PRINCIPAL DISCHARGE DIAGNOSIS  Diagnosis: Acute on chronic combined systolic and diastolic congestive heart failure  Assessment and Plan of Treatment: You were admitted for chest pain  You were found to have an acute on chronic heart failure exacerbation likely from adenovirus   You were treated with diuretics and were evaluated by pulmonology and cardiology  You have a large left pleural effusion, but are refusing a thoracentesis  Follow up with primary care provider within 1 week, as well as cardiology and pulmonology outpatient     PRINCIPAL DISCHARGE DIAGNOSIS  Diagnosis: Acute on chronic combined systolic and diastolic congestive heart failure  Assessment and Plan of Treatment: You were admitted for chest pain  You were found to have an acute on chronic heart failure exacerbation likely from adenovirus   You were treated with diuretics and were evaluated by pulmonology and cardiology  Continue to take Eliquis 2.5 mg twice a day   You have a large left pleural effusion, but are refusing a thoracentesis  Follow up with primary care provider within 1 week, as well as cardiology and pulmonology outpatient     PRINCIPAL DISCHARGE DIAGNOSIS  Diagnosis: Acute on chronic combined systolic and diastolic congestive heart failure  Assessment and Plan of Treatment: You were admitted for chest pain  You were found to have an acute on chronic heart failure exacerbation likely from adenovirus   You were treated with diuretics and were evaluated by pulmonology and cardiology  Continue to take Eliquis 2.5 mg twice a day   Continue lasix 40mg twice daily  You were also started on a medication called farxiga for heart failure  You have a large left pleural effusion, but are refusing a thoracentesis  Follow up with primary care provider within 1 week, as well as cardiology and pulmonology outpatient

## 2023-09-22 NOTE — PROVIDER CONTACT NOTE (OTHER) - ASSESSMENT
BP: 151/80 HR:73, O2 Sat. 94% on RA T: 98.1 RR:19.  Pt stated being SOB while on recliner. No sign of acute distress noted.

## 2023-09-22 NOTE — DISCHARGE NOTE PROVIDER - HOSPITAL COURSE
Hospital Course  88yo male with PMH CVA, Chronic combined CHF (EF 45-50%), Chronic Afib, CKD3 admitted for chest pain r/o ACS.  RVP + for Adenovirus.   Also with acute hypoxic respiratory failure possibly due to Adenovirus.  Pt with acute on chronic combined CHF (EF 50%) and chronic Afib on long term anticoagulation.  Pt was placed on O2 supplementation and seen by Pulmonary.  CXR revealed left pleural effusion.   Trops x 2 neg, CT chest on 9/19:  Small/moderate size left pleural effusion.  Lingular and left basilar parenchymal opacification favored to represent  atelectasis. Partially imaged 6.2 cm right renal lesion; this lesion is indeterminate, but could represent a hemorrhagic/proteinaceous cyst.  Cardiology was consulted, recommendation is to continue IV lasix and he was started on Farxiga during this admission.  Pt to continue on Apixaban and Metoprolol for Atrial fib.  Pt had V/Q SCAN, neg for PE, dopplers with no DVT's.  Pulm following: defer thoracentesis for now, pt refusing and he is on Apixaban, so not ideally recommended.  U/S chest still pending.    He has COPD, chronic condition, he is not on home O2.    PT eval and rec is for home PT.    Palliative Care / Advanced Care Planning  Code Status:  FULL CODE    Discharging Provider:  ARMAAN Ruiz  Contact Info: Cell [phone] - Please call with any questions or concerns.    Outpatient Provider:  Dr Duval       Hospital Course  89yo male with PMH CVA, Chronic combined CHF (EF 45-50%), Chronic Afib, CKD3 admitted for chest pain r/o ACS.  RVP + for Adenovirus.   Also with acute hypoxic respiratory failure possibly due to Adenovirus.  Pt with acute on chronic combined CHF (EF 50%) and chronic Afib on long term anticoagulation.  Pt was placed on O2 supplementation and seen by Pulmonary.  CXR revealed left pleural effusion.   Trops x 2 neg, CT chest on 9/19:  Small/moderate size left pleural effusion.  Lingular and left basilar parenchymal opacification favored to represent  atelectasis. Partially imaged 6.2 cm right renal lesion; this lesion is indeterminate, but could represent a hemorrhagic/proteinaceous cyst.  Cardiology was consulted, recommendation is to continue IV lasix and he was started on Farxiga during this admission.  Pt to continue on Apixaban and Metoprolol for Atrial fib.  Pt had V/Q SCAN, neg for PE, dopplers with no DVT's.  Pulm following: defer thoracentesis for now, pt refusing and he is on Apixaban, so not ideally recommended.  U/S chest performed, shows large left effusion, discussed findings with patient and he is still refusing thoracentesis.    He has COPD, chronic condition, he is not on home O2.    PT eval and rec is for home PT.    Palliative Care / Advanced Care Planning  Code Status:  FULL CODE    Discharging Provider:  ARMAAN Ruiz, Francine CROOK  Contact Info: Cell [phone] - Please call with any questions or concerns.    Outpatient Provider:  Dr Duval-notified

## 2023-09-22 NOTE — PROGRESS NOTE ADULT - ASSESSMENT
chf  adenovirus  af  intermediate v/q, possible PE ( although see pulmonary note)  ckd  nsvt        suggest  may try farxiga ( hold lasix)   f/u cxr and bnp  continue oral a/c  monitor lytes bun  f/u ekg

## 2023-09-22 NOTE — PROGRESS NOTE ADULT - NS ATTEND AMEND GEN_ALL_CORE FT
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     #Acute hypoxic respiratory failure   #Adenovirus  #Acute/Chronic combined CHF (EF 50%)   - acute resp failure multifactorial- due to CHF exacerbation and adenovirus  - current on RA, saturating well  - lasix transitioned to PO today  - repeat CXR on prelim read with persistent L sided opacification/ effusion, however patient feeling better although with some HER- saturating well on RA after exertion  - defer thoracentesis for now as per pulm due to patient being on AC  - low suspicion for PE as per pulm, continue current AC  - PT evaluated- saturated well with ambulation 94%  - pulm recs appreciated, discussed with Dr. Sr  - cardio recs appreciated, discussed with Dr. Quinones  - PT recommend HCPT    #renal cyst  - renal US reviewed, no signs of suspicious lesions

## 2023-09-22 NOTE — PROGRESS NOTE ADULT - ASSESSMENT
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     *Acute hypoxic respiratory failure possibly due to Adenovirus  *Chest Pain   *Acute hypoxic respiratory failure due to Acute/Chronic combined CHF (EF 50%)   *Chronic Afib on long term anticoagulation  *NSVT  - overnight on tele monitor pt. had 5 bts of NSVT, cont to monitor-- cont BB  - Trops x2 neg  -CT chest w/o contrast:  Small/moderate size left pleural effusion.  Lingular and left basilar parenchymal opacification favored to represent  atelectasis. Partially imaged 6.2 cm right renal lesion; this lesion is indeterminate, but could represent a hemorrhagic/proteinaceous cyst.  - Cardiology following;   - Pulm following;   - V/Q scan findings noted- is on Eliquis 2.5mg BID for Afib. Pulm w. lower suspicion for PE, will maintain at this dose (unable to have CTA 2/2 CKD), b/l LE u/s neg  - noted TSH and Lipid panel   - Cont ASA, Statin   - Cont Diuresis, on Lasix 40 mg po bid  - PT eval     *Adenovirus   - C/w contact and respiratory Droplet precautions  - Supportive care    *HTN  -Cont Toprol XL 50mg daily, hydralazine 25mg q 8hrs    *COPD  -Chronic, stable  -Per patient, not on home O2  -Continue supplemental oxygen with nasal canula to maintain SpO2 > 88% as needed  -Continue home meds - Spiriva qd, Symbicort bid, albuterol q6h prn    *CKD3   -avoid nephrotoxins, monitor BMP    *Renal Cyst- Incidental finding   Noted on CT as above  Renal u/s pending to better eval     *Hypokalemia- resolved  -monitor electrolytes and replete as needed    DVT ppx: Eliquis    Code Status: Full Code  HCP: Daughter Frida - patient prefers to update family.  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     *Acute hypoxic respiratory failure possibly due to Adenovirus  *Chest Pain   *Acute hypoxic respiratory failure   *Acute/Chronic combined CHF (EF 50%)   *Chronic Afib on long term anticoagulation  *NSVT  - repeat CXR done today, appears to still have left pleural effusion--await Radiology reading  - overnight on tele monitor pt. had 5 bts of NSVT, cont to monitor-- cont BB  - Trops x2 neg  -CT chest on 9/19:  Small/moderate size left pleural effusion.  Lingular and left basilar parenchymal opacification favored to represent  atelectasis. Partially imaged 6.2 cm right renal lesion; this lesion is indeterminate, but could represent a hemorrhagic/proteinaceous cyst.  - Cardiology following; cont current diuresis  - Pulm following  - V/Q scan findings noted- is on Eliquis 2.5mg BID for Afib.--low suspicion for PE, Dopplers with no DVT's  - noted TSH and Lipid panel   - Cont ASA, Statin   - Cont Diuresis, on Lasix 40 mg po bid  - PT eval rec is for home PT  - DASH diet    *Adenovirus   - C/w contact and respiratory Droplet precautions  - Supportive care    *HTN  -Cont Toprol XL 50mg daily, hydralazine 25mg q 8hrs    *COPD  -Chronic, stable  -Per patient, not on home O2  -no supplemental O2 needs  -Continue home meds - Spiriva qd, Symbicort bid, albuterol q6h prn    *CKD3   -avoid nephrotoxins, monitor BMP    *Renal Cyst- Incidental finding   Noted on CT as above  Renal u/s pending to better eval     *Hypokalemia- resolved  -monitor electrolytes and replete as needed    DVT ppx: Eliquis    Code Status: Full Code  HCP: Daughter Frida - patient prefers to update family.  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     *Acute hypoxic respiratory failure possibly due to Adenovirus  *Chest Pain   *Acute hypoxic respiratory failure   *Acute/Chronic combined CHF (EF 50%)   *Chronic Afib on long term anticoagulation  *NSVT  - repeat CXR done today, appears to still have left pleural effusion--await Radiology reading  - overnight on tele monitor pt. had 5 bts of NSVT, cont to monitor-- cont BB  - Trops x2 neg  -CT chest on 9/19:  Small/moderate size left pleural effusion.  Lingular and left basilar parenchymal opacification favored to represent  atelectasis. Partially imaged 6.2 cm right renal lesion; this lesion is indeterminate, but could represent a hemorrhagic/proteinaceous cyst.  - Cardiology following; cont current diuresis  - Pulm following  - V/Q scan findings noted- is on Eliquis 2.5mg BID for Afib.--low suspicion for PE, Dopplers with no DVT's  - noted TSH and Lipid panel   - Cont ASA, Statin   - Cont Diuresis, on Lasix 40 mg po bid  - PT eval rec is for home PT  - DASH diet    *Adenovirus   - C/w contact and respiratory Droplet precautions  - Supportive care    *HTN  -Cont Toprol XL 50mg daily, hydralazine 25mg q 8hrs    *COPD  -Chronic, stable  -Per patient, not on home O2  -no supplemental O2 needs  -Continue home meds - Spiriva qd, Symbicort bid, albuterol q6h prn    *CKD3   -avoid nephrotoxins, monitor BMP    *Renal Cyst- Incidental finding   Noted on CT as above  Renal u/s pending to better eval     *Hypokalemia- resolved  -monitor electrolytes and replete as needed    DVT ppx: Eliquis    Code Status: Full Code    Dispo:  updated Daughter on phone today; Frida [phone]

## 2023-09-22 NOTE — DISCHARGE NOTE PROVIDER - NSDCQMERRANDS_GEN_ALL_CORE
[facility]  Advanced Heart Failure, MCS, Transplant and Pulmonary Hypertension Cardiology  Progress Note    Date: 2022   Patient Name: Grant Sumner  : 1956  MRN: [id number]   PCP: Jose Dobson MD    Reason for consult: Evaluation of cause of SOB  Physician requesting consult: Dr. Abbey Montalvo    Chief complaint: Progressive SOB and chronic cough      Subj: Pt seen and examined this afternoon.  His wife was on speaker during this time, also.        HPI:  Grant Sumner is a 66 year old male with PMHx of double aortic arch, HTN, DM type 2, chronic systolic heart failure s/p ICD in 2019, and mitral insufficiency who was admitted for elective RHC. Dx with HF about 20 years ago per pt. Pt with double aortic arch and trying to r/o source of SOB whether it be HF or obstruction of bronchi by double aortic arch. He can walk 3-4 blocks until he gets short of breath. He states it is worse when walking up stairs. He was diagnosed with bronchitis and asthma in the past. He has some wheezing occasionally with inspiration. Denies BLE edema. He has SOB and choking if he does not sleep with 3 pillows. He also has DERIC. Reports medication compliance at home. He works closely with his pharmacist to adjust his medications. He is allergic to penicillin. He has cut back on sodium intake. AHF team consult requested for evaluation of SOB.     SHx: Pt lives with his wife. He used to be a  for 22 years. Has been unemployed since the start of the pandemic. Denies Hx of tobacco use. Hx of marijuana occasionally in the past. Drinks alcohol socially.   FMHx: His daughter does not have any heart problems but does have DM. Mother: DM, HTN, heart disease,  at age 87. Father: unknown. Brother: heart disease requiring ICD at 61y/o, MI with stents. Brother: surgery. Dementia/Alzheimers in his sister.    Key Events:  9/15/22: MAPs 75-89. CVP 3. PAP 36/14. PCWP 8. CO 3. CI 1.6. SVR  1920. MVO2 dropped from 62% to 59%. Na 136. K 4.2. BUN 34. Cr 1.27. WBC 6.2. HGB 15.1.   9/16/22: HR 70s-80s, NSR. MAPs 60s. Hemos this AM: CVP 3, PAP 36/11/20, CO 3.6, CI 1.9, SVR 1480. MVO2 65%. Milrinone 0.25 mcg/kg/min. Na 136. BUN 26. Cr 1.29. WBC 6.3. Hgb 14.6. . . NT proBNP 441. Net -~400mL/24hrs. Administered a total of 500 cc of NS0.9 o/n d/t low CVP (0--1)  9/17/22: upper 60s-mid/low 70s. MAPs 60s-80s. Hemos this AM: CVP 2, PAP 34/13/21, CO 3.7, CI 1.9, SVR 1786. MVO2 69%.  Milrinone 0.25 mcg/kg/min. Na 133. BUN 25. Cr 1.50. NT proBNP 393. WBC 6.2. Hgb 14.1. . Net -1.830L/24hrs. CTA yesterday.   9/18/22: HR upper 60s-upper 80s, AFIB. MAPs upper 30s-low 80s. Hemos this AM: CVP 5, PAP 50/22/27, CO 3.5, CI 1.8, SVR 1468. MVO2 72%. Milrinone 0.25 mcg/kg/min. Na 131. BUN 27. Cr 1.89. WBC 7.8. Hgb 14.2. . Net +445mL/24hrs. Got hypotensive with SBP in the 40s-50s after getting Coreg and was given 250 cc NS bolus.   9/19/22: MAPs 70s. CVP 6, PA 50/20s, CO 3.7, CI 1.9, SVR 1490. MVO2 72%. On Milrinone 0.375 mcg/kg/min. Na 130. Cr 1.41. BUN 29. WBC stable. Net negative 2.1L.   9/20/22: HR mid 60s-80s, AFIB. MAPs 70s-lower 80s. Hemos this AM: CVP 8, PAP 37/14/17, CO 3.4, CI 1.8, SVR 1646. MVO2 73%. Milrinone 0.375 mcg/kg/min. Na 130. BUN 27. Cr 1.14. NT proBNP 243. WBC 5.8. Hgb 14.0. . Net -1.114L/24hrs. Entresto held overnight d/t SBP in the 80s. CI 2.2 by ASHLEY.   9/21/22: Colonoscopy today: 4 mm sessile polyp of the sigmoid colon removed via cold snare polypectomy, advanced diet per GI recs  9/22/22: HR 70s->120s this AM (flipped into AFIB). Hemos this AM: CVP 8, PAP 59/19/30, CO 4.2, CI 2.1, SVR 1480. Milrinone 0.375 mcg/kg/min. Na 128. BUN 20. Cr 0.94. NT proBNP 386. WBC 6.2. hgb 13.2. . Net -892mL/24hrs.   9/23/22: MAP 70-80s, SBP 100s, HR 80s, Cr 1.2, , WBC 7.0, Hgb 13.8, , Net -2 L/24h, on heparin gtt and milrinone 0.375 mcg/kg/min; Converted to sinus last  night s/p IV amio bolus x1.  9/24/22: MAPs 69-92. Na 130. K 4.1. BUN 20. Cr 1.29. WBC 7.4. HGB 13.2. Gtts: Milrinone 0.375 mcg/kg/min and Heparin gtt. Net -1.9 L/24hrs.   9/25/22: Milrinone 0.375 mcg/kg/min. Hgb 12.8. Net negative 2.3L.     ROS:  CONSTITUTIONAL:  No weight loss, fever, chills, sweats.  HEENT:  No visual loss, blurred vision, double vision. No hearing loss, sneezing, congestion, runny nose or sore throat.  SKIN:  No rash or itching.  CARDIOVASCULAR:  No chest pain, chest pressure or chest discomfort. No palpitations or edema. +PND, orthopnea  RESPIRATORY:  No cough or sputum. +SARMIENTO  GASTROINTESTINAL:  No anorexia, nausea, vomiting or diarrhea. No abdominal pain or blood.   GENITOURINARY:  No burning on urination, no decreased urine output.   NEUROLOGICAL:  No headache, syncope, paralysis, ataxia, numbness or tingling in the extremities.   MUSCULOSKELETAL:  No joint redness or swelling. No recent trauma.  HEMATOLOGIC:  No anemia, bleeding or bruising.  PSYCHIATRIC:  No history of depression or anxiety.  ENDOCRINOLOGIC:  No reports of sweating, cold or heat intolerance. No polyuria or polydipsia.    Past Medical History:   Diagnosis Date   • Asthma    • Benign colonic polyp    • BPH (benign prostatic hyperplasia)    • Cardiomyopathy (CMS/HCC)    • Carpal tunnel syndrome    • CKD (chronic kidney disease)    • Congestive cardiac failure (CMS/HCC)    • DM (diabetes mellitus) (CMS/HCC)    • Double aortic arch    • Essential (primary) hypertension    • Hyperlipidemia    • SAS (sleep apnea syndrome)     uses cpap   • Ventricular tachycardia (CMS/HCC)      Past Surgical History:   Procedure Laterality Date   • Cardiac defibrillator placement       Family History   Problem Relation Age of Onset   • Diabetes Mother    • Heart disease Mother    • Congestive Heart Failure Mother    • Dementia/Alzheimers Sister    • Patient is unaware of any medical problems Sister         good health   • Patient is unaware of any  medical problems Sister         good health   • Congestive Heart Failure Brother    • Congestive Heart Failure Brother    • Patient is unaware of any medical problems Brother         Sudden death but unsure of the cause, stated he had several health conditions, but is not aware of what they were.   • Hypertension Other    • Diabetes Other         Diabetes mellitus type 2     Social History     Tobacco Use   • Smoking status: Never Smoker   • Smokeless tobacco: Never Used   Substance Use Topics   • Alcohol use: Yes     Alcohol/week: 2.0 standard drinks     Types: 2 Glasses of wine per week     Comment: occasionally   • Drug use: Never     Current Facility-Administered Medications   Medication   • sodium chloride 0.9% infusion   • sodium chloride (PF) 0.9 % injection 10 mL   • sodium chloride (PF) 0.9 % injection 10 mL   • sodium chloride (PF) 0.9 % injection 10 mL   • sodium chloride (PF) 0.9 % injection 10 mL   • sodium chloride (PF) 0.9 % injection 20 mL   • heparin (porcine) 25,000 units/250 mL in dextrose 5 % infusion   • heparin (porcine) injection 4,000 Units   • heparin (porcine) injection 2,000 Units   • [Held by provider] furosemide (LASIX) tablet 20 mg   • spironolactone (ALDACTONE) tablet 25 mg   • acetaminophen (TYLENOL) tablet 650 mg   • docusate sodium-sennosides (SENOKOT S) 50-8.6 MG 1 tablet   • milrinone (PRIMACOR) 20 mg/100 mL in dextrose 5 % infusion premix   • sodium chloride 0.9 % flush bag 25 mL   • Potassium Standard Replacement Protocol (Levels 3.5 and lower)   • Potassium Replacement (Levels 3.6 - 4)   • Magnesium Standard Replacement Protocol   • sodium chloride 0.9 % flush bag 25 mL   • sodium chloride (PF) 0.9 % injection 2 mL   • sodium chloride 0.9% infusion   • sodium chloride 0.9% infusion   • sodium chloride (NORMAL SALINE) 0.9 % bolus 500 mL   • budesonide-formoterol (SYMBICORT) 160-4.5 MCG/ACT inhaler 2 puff   • albuterol inhaler 2 puff   • dextrose 50 % injection 25 g   • dextrose  50 % injection 12.5 g   • glucagon (GLUCAGEN) injection 1 mg   • dextrose (GLUTOSE) 40 % gel 15 g   • dextrose (GLUTOSE) 40 % gel 30 g   • insulin lispro (ADMELOG,HumaLOG) - Correction Dose   • empagliflozin (JARDIANCE) tablet 10 mg   • aspirin chewable 81 mg   • atorvastatin (LIPITOR) tablet 20 mg   • tamsulosin (FLOMAX) capsule 0.4 mg     ALLERGIES:   Allergen Reactions   • Milk Intolerance   (Food) GI UPSET   • Penicillins Other (See Comments)     Angioedema     Physical exam:  Visit Vitals  /71 (BP Location: LUE - Left upper extremity, Patient Position: Semi-Solorzano's)   Pulse 83   Temp 98.6 °F (37 °C) (Oral)   Resp 16   Ht 5' 7\" (1.702 m)   Wt 78.1 kg (172 lb 2.9 oz)   SpO2 99%   BMI 26.97 kg/m²     GENERAL:  NAD, alert and oriented x 3.   HEENT:  Sclerae were anicteric and oropharynx was clear.    The carotid upstroke was normal and there were no bruits.   CHEST: The lungs were clear to auscultation. No rales  CV:  NSR    No gallops, murmurs or rubs.     The PMI was not enlarged or displaced. No RV heave.    There was no hepatojugular reflex  ABDOMEN: Soft, non-distended with active bowel sounds.     No hepatomegaly. No ascites  EXTREM: Warm, well-perfused and with no cyanosis, clubbing    No peripheral edema.   SKIN:  No ulcerations or rashes.   NEURO: Non-focal.  PSYCH:  Normal affect, normal mood    Recent labs:   I independently reviewed the following labs, echocardiograms, imaging, and procedures    Recent Results (from the past 24 hour(s))   GLUCOSE, BEDSIDE - POINT OF CARE    Collection Time: 09/25/22  9:06 PM   Result Value Ref Range    GLUCOSE, BEDSIDE - POINT OF CARE 137 (H) 70 - 99 mg/dL   Partial Thromboplastin Time    Collection Time: 09/26/22  5:20 AM   Result Value Ref Range    PTT 45 (H) 22 - 30 sec   CBC No Differential    Collection Time: 09/26/22  5:20 AM   Result Value Ref Range    WBC 7.5 4.2 - 11.0 K/mcL    RBC 5.85 4.50 - 5.90 mil/mcL    HGB 12.9 (L) 13.0 - 17.0 g/dL    HCT 41.3  39.0 - 51.0 %    MCV 70.6 (L) 78.0 - 100.0 fl    MCH 22.1 (L) 26.0 - 34.0 pg    MCHC 31.2 (L) 32.0 - 36.5 g/dL     (L) 140 - 450 K/mcL    RDW-CV 14.6 11.0 - 15.0 %    RDW-SD 36.5 (L) 39.0 - 50.0 fL    NRBC 0 <=0 /100 WBC   GLUCOSE, BEDSIDE - POINT OF CARE    Collection Time: 09/26/22  7:37 AM   Result Value Ref Range    GLUCOSE, BEDSIDE - POINT OF CARE 140 (H) 70 - 99 mg/dL   Comprehensive Metabolic Panel    Collection Time: 09/26/22  8:28 AM   Result Value Ref Range    Fasting Status      Sodium 126 (L) 135 - 145 mmol/L    Potassium 4.0 3.4 - 5.1 mmol/L    Chloride 93 (L) 97 - 110 mmol/L    Carbon Dioxide 25 21 - 32 mmol/L    Anion Gap 12 7 - 19 mmol/L    Glucose 190 (H) 70 - 99 mg/dL    BUN 21 (H) 6 - 20 mg/dL    Creatinine 1.14 0.67 - 1.17 mg/dL    Glomerular Filtration Rate 71 >=60    BUN/ Creatinine Ratio 18 7 - 25    Calcium 9.7 8.4 - 10.2 mg/dL    Bilirubin, Total 0.8 0.2 - 1.0 mg/dL    GOT/AST 30 <=37 Units/L    GPT/ALT 45 <64 Units/L    Alkaline Phosphatase 41 (L) 45 - 117 Units/L    Albumin 4.0 3.6 - 5.1 g/dL    Protein, Total 8.1 6.4 - 8.2 g/dL    Globulin 4.1 (H) 2.0 - 4.0 g/dL    A/G Ratio 1.0 1.0 - 2.4   Magnesium    Collection Time: 09/26/22  8:28 AM   Result Value Ref Range    Magnesium 2.1 1.7 - 2.4 mg/dL   NT proBNP    Collection Time: 09/26/22  8:28 AM   Result Value Ref Range    NT-proBNP 1,101 (H) <=125 pg/mL   Osmolality    Collection Time: 09/26/22  8:28 AM   Result Value Ref Range    Osmolality 277 275 - 300 mOsm/kg   Sodium, Urine    Collection Time: 09/26/22 10:53 AM   Result Value Ref Range    Sodium, Urine 20 mmol/L   Osmolality, Urine    Collection Time: 09/26/22 10:53 AM   Result Value Ref Range    Osmolality, Urine 179 50 - 1,200 mOsm/kg   GLUCOSE, BEDSIDE - POINT OF CARE    Collection Time: 09/26/22 11:17 AM   Result Value Ref Range    GLUCOSE, BEDSIDE - POINT OF CARE 158 (H) 70 - 99 mg/dL   GLUCOSE, BEDSIDE - POINT OF CARE    Collection Time: 09/26/22  4:34 PM   Result Value  Ref Range    GLUCOSE, BEDSIDE - POINT OF CARE 112 (H) 70 - 99 mg/dL   Basic Metabolic Panel    Collection Time: 09/26/22  6:21 PM   Result Value Ref Range    Fasting Status      Sodium 125 (L) 135 - 145 mmol/L    Potassium 4.1 3.4 - 5.1 mmol/L    Chloride 92 (L) 97 - 110 mmol/L    Carbon Dioxide 24 21 - 32 mmol/L    Anion Gap 13 7 - 19 mmol/L    Glucose 244 (H) 70 - 99 mg/dL    BUN 24 (H) 6 - 20 mg/dL    Creatinine 1.27 (H) 0.67 - 1.17 mg/dL    Glomerular Filtration Rate 62 >=60    BUN/ Creatinine Ratio 19 7 - 25    Calcium 9.1 8.4 - 10.2 mg/dL     Encounter Date: 09/14/22   Electrocardiogram 12-Lead   Result Value    Ventricular Rate EKG/Min (BPM) 94    Atrial Rate (BPM) 80    QRS-Interval (MSEC) 124    QT-Interval (MSEC) 376    QTc 470    R Axis (Degrees) -74    T Axis (Degrees) 82    REPORT TEXT      Atrial fibrillation  with premature ventricular or aberrantly conducted complexes  Left anterior fascicular block  Septal infarct  (cited on or before  08-JUL-2022)  Lateral infarct  (cited on or before  08-JUL-2022)  Abnormal ECG  When compared with ECG of  18-SEP-2022 02:10,  no significant change  ST no longer depressed in  Inferior leads  T wave inversion no longer evident in  Inferior leads  T wave inversion no longer evident in  Lateral leads  Confirmed by JESSICA DRISCOLL, REBECCA ([id number]) on 9/26/2022 11:21:49 AM       PERTINENT CARDIODIAGNOSTICS:     TTE 2/24/21- EF: 25%   TTE 01/20/20 - EF 20-25%; RVSP 48 mm Hg   TTE 12/20/18 - EF 25-30%  TTE 04/03/18 - EF 30-35%; dpooj7CG; RVSP 50 mmHg  TTE 12/13/17 - EF 35%  TTE 10/13/16 - EF 35-40%      Stress echo 01/19/16   Stress ECHO:  No gross evidence of inducible ischemia at 84% target heart rate by stress echocardiogram.  Baseline electrocardiogram sinus rhythm, low voltage, old AWMI, LAFB.  Baseline echocardiogram normal left ventricular chamber size when indexed to BSA, without left ventricular hypertrophy, preserved wall motion and adequate global systolic function.    Normal blood pressure/ heart rate response during stress testing.  Nonsustained ventricular tachycardia at peak HR.  NYHA Functional Class I, total METs achieved 6.9.  EF 40-45%     TTE 7/30/15  Conclusions:  Normal left ventricular chamber size when indexed to BSA, without hypertrophy, with decreased systolic function, EF 33%   Abnormal LV diastolic function, with INcrease in LV diastolic filling pressure.  Abnormal left atrial volume index with normal right-sided chamber dimensions.  Indeterminate right ventricular systolic pressure with normal right ventricular systolic function.     TTE 4/6/15  Conclusions:  Upper Normal left ventricular chamber size without hypertrophy, with global hypokinesis of wall motion with severe systolic dysfunction, EF= 25%   Grade 3 LV diastolic dysfunction, with increase in LV diastolic filling pressure.  Moderately abnormal Left Atrial Volume Index with normal right-sided chamber dimensions.  Mild mitral and tricuspid regurgitation.  Indirect signs of mildly elevated right ventricular systolic pressure with low normal to reduced right ventricular systolic function.     CPX 9/21/21-   IMPRESSION:   1.  Reduced Functional capacity 5  2.  Exercise ECG demonstrated frequent PVCs, PACs  3.  This was maximal effort study.   4.  Peak VO2 was 15.8 ml/kg/min which is 26.3% of predicted, this is consistent with cardiovascular limitation to exercise.   5.  Blunted augmentation of stroke volume with exercise  6.  Evidence of chronotropic incompetence  7.  Abnormal baseline resting spirometry consider formal pulmonary function testing with and without bronchodilator.  8.  ETCO2 was 25 suggesting abnormal perfusion/ventilation matching with exercise.  This can be seen in pulmonary pretension or congestive heart failure states.    Echocardiogram from 9/14/22:  *[facility]*  [address] [address] [phone]  Transthoracic Echocardiogram  (TTE)     Patient: Grant Sumner     Study Date/Time:      Sep 14 2022 11:28PM  MRN:     [id number]               FIN#:                 [id number]  :     1956            Ht/Wt:                170.2cm 79.8kg  Age:     66                    BSA/BMI:              1.96m^2 27.6kg/m^2  Gender:  M                     Baseline BP:          62 / 54  Ordering Physician:   Phoebe Pino     Referring Physician:  Phoebe Pino MD     Attending Physician:  Abbey Montalvo     Diagnostic Physician: Erick Reyes MD  Sonographer:          Christine Ricketts RDCS     ------------------------------------------------------------------------------  INDICATIONS:   Heart failure.     ------------------------------------------------------------------------------  STUDY CONCLUSIONS  SUMMARY:     1. Left ventricle: The cavity size is dilated. Wall thickness is normal. The     ejection fraction was measured by biplane method of disks. The ejection     fraction is 20%.  2. Left atrium: The atrium is mildly to moderately dilated.  3. Right ventricle: The cavity size is normal. Systolic function is reduced.     Pacemaker lead noted in right ventricle.  4. Right atrium: Pacemaker lead noted in right atrium.     ------------------------------------------------------------------------------  STUDY DATA:  Patient room number: 7116E.  Procedure:  A transthoracic  echocardiogram was performed. Image quality was good. Intravenous contrast  (Definity 2ml) was administered.  M-mode, complete 2D, complete spectral  Doppler, and color Doppler.  Study status:  Routine.  Study completion:  There  were no complications.     FINDINGS     LEFT VENTRICLE:  The cavity size is dilated. Wall thickness is normal.  Systolic function is severely reduced.  There is no evidence of a thrombus .   The ejection fraction was measured by biplane method of disks. The ejection  fraction is 20%.     AORTIC VALVE:  The annulus is normal.  The valve is structurally normal. The  valve is trileaflet. The leaflets are normal thickness. Cusp separation is  normal. Velocity is within the normal range. There is no stenosis. There is no  regurgitation.     AORTA:  Aortic root: The root is normal-sized.  Ascending aorta: The vessel is normal-sized.     MITRAL VALVE:  The valve is structurally normal. The annulus is normal. The  leaflets are normal thickness. Leaflet separation is normal. Inflow velocity  is within the normal range. There is no evidence for stenosis. There is mild  regurgitation.     LEFT ATRIUM:  The atrium is mildly to moderately dilated.     RIGHT VENTRICLE:  The cavity size is normal. Systolic function is reduced. The  estimated peak pressure is 45mm Hg. Pacemaker lead noted in right ventricle.     PULMONIC VALVE:   The leaflets are normal thickness. There is no  regurgitation.     TRICUSPID VALVE:  The leaflets are normal thickness. Leaflet separation is  normal. There is mild regurgitation.     RIGHT ATRIUM:  The atrium is normal in size. Pacemaker lead noted in right  atrium.     PERICARDIUM:   There is no pericardial effusion.     ------------------------------------------------------------------------------  Measurements      Left ventricle            Value        Ref       07/19/2022  LVOT                     Value          Ref       07/19/2022   ANAYA, LAX chord        (N) 5.7   cm     4.2 - 5.8 6.5         Diam, S                  1.8   cm       --------- ----------   ESD, LAX chord        (H) 5.3   cm     2.5 - 4.0 5.7         Area                     2.5   cm^2     --------- ----------   ANAYA/bsa, LAX chord    (N) 2.9   cm/m^2 2.2 - 3.0 3.4   ESD/bsa, LAX chord    (H) 2.7   cm/m^2 1.3 - 2.1 3.0         Right ventricle          Value          Ref       07/19/2022   PW, ED, LAX           (N) 0.9   cm     0.6 - 1.0 1.1         ANAYA, LAX                 2.5   cm       --------- 3.2   ANAYA major ax, A4C         8.9   cm     --------- 9.0    ESD major ax, A4C         8.4   cm     --------- 8.7         Left atrium              Value          Ref       07/19/2022   FS major axis, A4C        6     %      --------- 3           AP dim, ES           (N) 4.0   cm       3.0 - 4.0 5.4   ANAYA/bsa major ax, A4C     4.5   cm/m^2 --------- 4.7         AP dim index, ES     (N) 2.0   cm/m^2   1.5 - 2.3 ----------   ESD/bsa major ax, A4C     4.3   cm/m^2 --------- 4.6         Area ES, A4C         (H) 27    cm^2     <=20      27   CHON, A4C                  37.1  cm^2   --------- 41.8        Area/bsa ES, A4C         13.57 cm^2/m^2 --------- ----------   KRISTOPHER, A4C                  31.6  cm^2   --------- 36.1        Area ES, A2C             23    cm^2     --------- 27   FAC, A4C                  15    %      --------- 14          Area/bsa ES, A2C         11.73 cm^2/m^2 --------- ----------   IVS, ED               (N) 1.0   cm     0.6 - 1.0 1.0         Vol, ES, 1-p A4C     (H) 76    ml       18 - 58   89   PW, ED                (N) 0.9   cm     0.6 - 1.0 1.1         Vol/bsa, ES, 1-p A4C (H) 39    ml/m^2   12 - 37   46   IVS/PW, ED                1.05         --------- 0.95        Vol, ES, 1-p A2C     (H) 73    ml       18 - 58   89   EDV                   (H) 181   ml     62 - 150  278         Vol/bsa, ES, 1-p A2C (N) 37    ml/m^2   11 - 43   46   ESV                   (H) 152   ml     21 - 61   186         Vol, ES, 2-p             83    ml       --------- 92   EF                    (L) 20    %      52 - 72   25          Vol/bsa, ES, 2-p     (H) 42    ml/m^2   16 - 34   48   SV                        19    ml     --------- 57   EDV/bsa               (H) 92    ml/m^2 34 - 74   145         Tricuspid valve          Value          Ref       07/19/2022   ESV/bsa               (H) 78    ml/m^2 11 - 31   97          TR peak v            (H) 3.1   m/sec    <=2.8     3.3   SV/bsa                    10    ml/m^2 --------- 30          Peak RV-RA grad, S       39    mm Hg    ---------  43   SV, 1-p A4C               26    ml     --------- 37   SV/bsa, 1-p A4C           13    ml/m^2 --------- 19          Aortic root              Value          Ref       2022   EDV, 2-p              (N) 140   ml     62 - 150  159         Root diam, ED        (N) 2.7   cm       2.6 - 4.1 ----------   ESV, 2-p              (H) 107   ml     21 - 61   119   SV, 2-p                   33    ml     --------- 40   EDV/bsa, 2-p          (N) 71    ml/m^2 34 - 74   83   ESV/bsa, 2-p          (H) 55    ml/m^2 11 - 31   62   SV/bsa, 2-p               16.8  ml/m^2 --------- 20.8  Legend:  (L)  and  (H)  betty values outside specified reference range.     (N)  marks values inside specified reference range.     Prepared and electronically signed by  Erick Reyes MD  09/15/2022 09:27     Stress test from 22:  *[facility]*  [address] [address] [phone]  Myocardial Perfusion Imaging     Regadenoson (Lexiscan)     Rest/Stress     Patient: Grant Sumner     Study Date/Time:     2022 8:57AM  MRN:     [id number]               FIN#:                [id number]  :     1956            Ht/Wt:               170cm 83.7kg  Age:     66                    BSA/BMI:             2.01m^2 29kg/m^2  Gender:  M                     Baseline BP:  Ordering Physician:     Femi Rae MD  Referring Physician:    Femi Rae MD     Attending Physician:    Femi Rae MD  Diagnostic Physician:   Taj Jaeger MD     --------------------------------------------------------------------------     --------------------------------------------------------------------------  Indications:   Heart failure, systolic murmur.     --------------------------------------------------------------------------  Study Conclusions     Summary:     1. Myocardial perfusion imaging: The left ventricle is dilated. There is     no transient ischemic dilation of the left ventricle during  stress. LV     dilation. Inferior wall fixed defect. Apical artifact. No reversible     ischemia.  2. Gated SPECT: The calculated left ventricular ejection fraction is 30%.  3. Stress ECG conclusions: The stress ECG is negative for ischemia. [facility]     scoring: exercise time of 1min; ; .     --------------------------------------------------------------------------  Study data:   Nuclear components:    Rest/stress imaging.  Consent:  The  risks, benefits, and alternatives to the procedure were explained to the  patient.  Study completion:  The patient tolerated the procedure well.     --------------------------------------------------------------------------  Procedure data:  Initial setup. The patient was brought to the laboratory.  A baseline ECG was recorded. Intravenous access was obtained. Surface ECG  leads and manual cuff blood pressure measurements were monitored.  Regadenoson (Lexiscan) stress test. Regadenoson (Lexiscan) was  administered by intravenous bolus, followed by a 5ml saline flush. The  total dose was 0.4mgover 10.00sec.     --------------------------------------------------------------------------  Isotope administration:     +-------------+----------------+----------------+  !Stage        !Rest            !Stress          !  +-------------+----------------+----------------+  !Agent        !Tc-99m sestamibi!Tc-99m sestamibi!  +-------------+----------------+----------------+  !Injected dose!9.3mCi          !29.6mCi         !  +-------------+----------------+----------------+  !Route        !IV              !IV              !  +-------------+----------------+----------------+  Image properties:   Gated imaging was performed.     --------------------------------------------------------------------------  Baseline ECst degree AV block with non-specific conduction delay.     --------------------------------------------------------------------------  Cardiac stress  table:     +-------------------+--+-----------+------------+------------------------+  !Stage              !HR!BP         !Symptoms    !Comments                !  +-------------------+--+-----------+------------+------------------------+  !PREINFSN SUPINE    !69!106/71 (83)!------------!No symptoms, 100% Sp02. !  +-------------------+--+-----------+------------+------------------------+  !INFUSION INFUSION  !78!-----------!------------!------------------------!  +-------------------+--+-----------+------------+------------------------+  !POSTINFSN RECOVERY1!88!101/67 (78)!------------!100% Sp02. Dyspnea,     !  !                   !  !           !            !\"warm\" in the stomach.  !  +-------------------+--+-----------+------------+------------------------+  !POSTINFSN RECOVERY2!81!101/67 (78)!------------!------------------------!  +-------------------+--+-----------+------------+------------------------+  !POSTINFSN RECOVERY3!76!-----------!------------!------------------------!  +-------------------+--+-----------+------------+------------------------+  !POSTINFSN RECOVERY4!76!111/73 (86)!------------!symptoms improved. 100% !  !                   !  !           !            !Sp02.                   !  +-------------------+--+-----------+------------+------------------------+  !POSTINFSN RECOVERY5!78!111/73 (86)!------------!------------------------!  +-------------------+--+-----------+------------+------------------------+  !POSTINFSN RECOVERY6!76!-----------!------------!------------------------!  +-------------------+--+-----------+------------+------------------------+  !Baseline           !--!-----------!No symptoms.!------------------------!  +-------------------+--+-----------+------------+------------------------+  Stress results:   Maximal heart rate during stress was 92bpm (59% of  maximal predicted heart rate). The maximal predicted heart rate was  154bpm.The target heart rate was 131bpm.The target  heart rate was not  achieved. The rate-pressure product for the peak heart rate and blood  pressure was 8888mm Hg/min.  Stress ECG:   The stress ECG is negative for ischemia.   [facility] scoring:  exercise time of 1min; ; .     --------------------------------------------------------------------------  Myocardial perfusion imaging:   The left ventricle is dilated. There is no  transient ischemic dilation of the left ventricle during stress. LV  dilation. Inferior wall fixed defect. Apical artifact. No reversible  ischemia.  Gated SPECT:   The calculated left ventricular ejection fraction is 30%.                  Prepared and electronically signed by:     Taj Jaeger MD  07/08/2022 11:49      Cardiac cath from 9/14/22:  Key Findings/Plan    CONCLUSIONS:  1. Normal RA pressure  2.  Mildly elevated PA pressure  3.  Normal PCW pressure  4.  Elevated PVR  5.  Low Cardiac output and index     RECOMMENDATIONS:   1.  [place] left in place.    2.  Will admit for further evaluation for advanced therapies vs. Double aortic arch repair vs. Continued medical management     Femi Rae MD  Advanced HF and Transplant Cardiology  [facility]      Indication for procedure: Evaluation of single chamber cardioverter-defibrillator system status and screening for arrhythmia events, measurement of capture threshold and signal amplitude, and selection of appropriate programmed settings  Date of procedure:  9/15/2022                            Time of procedure:  1100                        EGM available for review: no  Ordered by: Phoebe Pino           Indication: LVAD workup           Reading Physician: Dr Mims     TYPE OF DEVICE       : [employer]       Device Model: Dynagen ICD D150                  SN: [id number]       Implant date: 2/4/2019  By: Dr SERGIO Mckay            RV Lead : BS         Model: [id number]         Implant date: 2/4/2019       MRI Safe: Yes     BATTERY STATUS       12 years     UNDERLYING RATE  AND RHYTHM       Presenting rhythm: VS                  Underlying rhythm: VS     , regular                  ventricular rate: 70 bpm       Percentage of ventricular beats that have been paced since counters were last reset:  0 %     PROGRAMMED SETTINGS  Bradycardia Settings       Mode: VVI       Lower rate limit: 40 bpm         Upper rate limit (sensor-driven):  NA     Tachycardia Settings       Ventricular tachycardia monitor zone detection threshold (if applicable):  170 bpm, lasting at least  5 seconds       Ventricular tachycardia therapy zone detection threshold:  off       Ventricular fibrillation therapy zone detection threshold:  200 bpm, lasting at least  2.5 seconds     LEAD FUNCTION  Right Ventricular Lead        R-Wave:  10.8 mV       Pacing impedance:  483ohms       Shock circuit impedance (distal coil only):  73 ohms       Shock circuit impedance (dual coil):   ohms       Capture threshold:  1.0 V @ 0.4 ms     ARRHYTHMIA EPISODES (Date of last reset:  6/23/2022)       # Ventricular high rate episodes since counters were last reset:  0       # Treated episodes of ventricular tachycardia/ventricular fibrillation since counters were last reset: 0     REPROGRAMMING       Indication(s) for reprogramming:  Interrogation of single chamber ICD per APN order. Full report printed and posted in medical record.       Change(s) made in programmed settings:  Maintained current settings.     DEVICE ALERTS:  None      SUMMARY  Normal device function.  No arrhythmias recorded.     Interrogation Performed by: Ishan Gaines RN (Device Specialist)    CTA Heart Coronary Arteries 9/16/22  A non-contrast enhanced, prospectively gated scan was performed for  detection of coronary calcium.  The threshold utilized for calcium  detection was 130 Hounsfield units; the Agatston scoring method was  performed for coronary calcium scoring     CT coronary angiography was performed on GE Revolution 256 Slice CT.  Contrast-enhanced  Yes coronary CT was obtained from the level of pulmonary  artery to the diaphragm with retrospective ECG gating and dose-modulation.     Prior to angiography, metoprolol per protocol was administered.   Immediately preceding image acquisition, nitro spray was given.  A bolus of  contrast was administered.  Retrospective reconstruction was performed at  multiple phases of the cardiac cycle.  Multi-planar images were reviewed on  a dedicated workstation.       CALCIUM SCORE     0-0:                              No identifiable plaque     1-10:                            Minimal plaque burden     :                        Mild plaque burden     101-400:                      Moderate plaque burden     Greater than 401:        Extensive plaque burden     SCORE SUMMARY:  Coronary Calcium Score (Agatston Score):  Left Main: u  Left anterior descendin  Left circumflex artery: 0  Right coronary artery:  0     Total Agatston Score:  0  The calcium score places the patient in 0-25th percentile for age and  gender-matched subjects.        Measurements:  LVEF:  26  %      Coronary Anatomy:  Dominance:    Right  Coronary origin & course:     Normal  Left main: No significant plaque or stenosis  Left anterior descending artery: Proximal LAD with no significant plaque or  stenosis. Mid LAD with noncalcified plaque of 25-50% stenosis. There is  motion artifact of the mid LAD. Distal LAD with no significant obstructive  plaque or stenosis.  Diagonal branches: First diagonal artery with no significant plaque or  stenosis. Second diagonal artery with no significant plaque or stenosis.  The third and fourth diagonal arteries are to small to characterize.  Left circumflex artery: Circumflex artery with no significant plaque or  stenosis.  Obtuse marginal branches: High takeoff of the first obtuse marginal branch  is too small to characterize. Circumflex artery terminates as an obtuse  marginal branch.  Right coronary artery: Right  coronary artery with no significant plaque or  stenosis. There is mild noncalcified plaque of less than 25% stenosis.  Posterior descending & posterolateral branches:    R-PDA with no  significant plaque or stenosis.      Cardiac Structure & Function  Left Ventricle: The LV size is dilated with severely reduced systolic  function.   Pericardium: No pericardial effusion  Aortic arch is incompletely visualized.   Other: Device lead noted in the right atrium and right ventricle. Biatrial  enlargement.  Trileaflet aortic valve.      IMPRESSION:     1.   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  2.   Mild nonobstructive coronary artery disease  3.   Right atrial and right ventricular device leads.   4.   Aortic arch is incompletely visualized.         CONTACT YOUR PHYSICIAN TO REVIEW YOUR FINDINGS        This interpretation is strictly limited to assessment of coronary artery  plaque or stenosis.. If evaluation of other thoracic structures, or  evaluation for other conditions is clinically relevant, then please  consider a dedicated imaging study to further evaluate that.     Electronically Signed by: DINORA JC MD   Signed on: 9/20/2022 1:36 PM         Impression and plan:    Grant Sumner is a 66 year old male with PMHx of double aortic arch, chronic systolic heart failure s/p ICD in 2019, and mitral insufficiency who was admitted for elective RHC, trying to r/o source of SOB whether it be HF or obstruction of bronchi by double aortic arch.     Chronic systolic heart failure s/p ICD in 2019  - Dx with HF about 20 years ago per pt  - Stage C, NYHA FC III - euvolemic on exam  - Etiology - presumed NICM - No LHC on file  - Echo 7/19/22 - EF: 25%  LVEDd: 6.5cm  RVSP: 60mmHg  - Echo 9/21/21 - EF: 15-20%  LVEDd: 6.1cm  mild-mod MR  RVSP: 48mmHg  CVP: 3mmHg  - Echo 2/24/21 - EF: 25%  LVEDd: 5.9cm  mod MR  RVSP: 44mmHg  - Echo 1/20/2020 - EF: 10-15% (personal review)  LVEDD: 5.7cm   mod-severe MR  nl RV size with mild-mod reduced RV fxn  IVC collapsible  RVSP: 42mmHg    - CPX: 1/25/22 - Ex: 8m37s  PeakVO2: 15.8 (60%)   VO2 pulse: 12 (80%)  Peak HR: 116  - High risk features: severe LV dysfunction  severely elevated ntprobnp  worsening volume status  possible prior ICD shocks.  - Barriers to advanced therapies: insight - although patient has support from wife.  - NT proBNP 671 on presentation   - CT chest 11/3/21: Double aortic arch with mass effect on the right margin of the trachea and compression of the esophagus.   - Echo 9/14/22: EF 20%, mild MR, mild TR  - S/p RHC (9/14/22) per Dr. Rae which showed normal RA pressure, mildly elevated PA pressure, normal PCWP, elevated PVR, and low CO and CI.   Current GDMT:  - Diuretic: Lasix 20 mg PO BID   - BB: Carvedilol 12.5 mg PO BID-->HOLDING  - ACEI/ARB/ARNI: Entresto 24-26 mg PO BID-->HOLDING  - MRA: Spironolactone 25 mg PO QD  - SGLT2i: Jardiance 10 mg daily  - Inotrope: Milrinone 0.375 mcg/kg/min  - AC: Heparin gtt  - Other: ASA 81 mg daily, Atorvastatin 20 mg daily  - COVID vaccine: x2  Device therapy: ICD  Advanced therapies: Ongoing LVAD/OHT workup   *No contraindications for OHT given double aortic arch per [facility]*    Double Aortic Arch  - Mild tracheal compression but unlikely primary cause of patient's SOB  - Could be an issue anatomically for advanced therapies  - Discussed with Dr. Hsu, congential specialist, unlikely that tracheal compression is primary etiology of patient's SOB and unlikely to benefit from tracheal stenting.    - CPX demonstrates acceptable parameters - PeakVO2: 15.8 (60% predicted)  - CTA Heart Coronaries 9/16/22: report above      Hypertension  - 9/18/22 hypotensive o/n. Holding coreg   - 9/20/22 hypotensive o/n. Holding entresto  - GDMT as above      VT  Afib  - NSVT noted on ICD interrogation in the past  - No ICD shocks on interrogations in Epic although those only date back to 2018  - Afib with  rates 100-120s overnight (9/21); converted to sinus s/p IV Amiodarone bolus, currently HR 70s-90s; Continue Heparin gtt;     CKD  - Possibly diabetic/HTNsive but also likely cardiorenal contribution  - Baseline appears 1.3-1.6  - Cr up to 1.29 today  - Will monitor    DM type 2  - A1c 6.4 on 7/30/22  - On Metformin and Farxiga 10 mg daily    DERIC  - Compliant with CPAP      Today's Plan:  - Hx of long standing cardiomyopathy, EF 25%. LVIDD 5.7 cm.   - Known hx of double aortic arch  - Remains on Milrinone 0.375 mcg/kg/min, PICC line placed, dc'd intro.  - Hold PO Lasix 20 mg BID, monitor renal function  - Continue Jardiance 10 mg daily and Spironolactone 25 mg daily   - Holding Entresto  - Continue ASA and statin  - Afib with rates 100-120s overnight (9/21); converted to sinus s/p IV Amiodarone bolus, currently in SR; Continue Heparin gtt  - ABO blood type O  - Pending completion of transplant evaluation; repeat SGC and potential IABP for upgrade on OHT list pending approval by consultants and insurance.   - Ongoing LVAD/OHT workup. Patient is a surgical candidate for OHT with no contradictions. 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       High medical complexity.  Acute on chronic heart failure.    Juana Laureano, FLAVIO, APRN-CNS, ACNS-BC    Attestation:    I have personally interviewed and examined the patient and agree with the findings as documented above.     I have discussed the management of the patient with the NP and agree with their plan as documented above.  Plan was discussed with the patient.    Chandan Aguilar MD  Heart Failure, Transplant, and Mechanical Circulatory Support

## 2023-09-22 NOTE — PROGRESS NOTE ADULT - SUBJECTIVE AND OBJECTIVE BOX
Patient is a 89y old  Male who presents with a chief complaint of chest pain r/o ACS (21 Sep 2023 12:43)      Patient seen and examined at bedside. No overnight events reported.  He still has shortness of breath with exertion, no chest pain, while at rest he states he is comfortable.    ALLERGIES:  No Known Allergies    MEDICATIONS  (STANDING):  allopurinol 100 milliGRAM(s) Oral daily  apixaban 2.5 milliGRAM(s) Oral every 12 hours  aspirin enteric coated 81 milliGRAM(s) Oral daily  atorvastatin 40 milliGRAM(s) Oral at bedtime  budesonide 160 MICROgram(s)/formoterol 4.5 MICROgram(s) Inhaler 2 Puff(s) Inhalation two times a day  furosemide    Tablet 40 milliGRAM(s) Oral two times a day  hydrALAZINE 25 milliGRAM(s) Oral every 8 hours  melatonin 6 milliGRAM(s) Oral at bedtime  metoprolol succinate ER 50 milliGRAM(s) Oral daily  multivitamin 1 Tablet(s) Oral daily  pantoprazole    Tablet 40 milliGRAM(s) Oral before breakfast  polyethylene glycol 3350 17 Gram(s) Oral daily  senna 2 Tablet(s) Oral at bedtime  tiotropium 2.5 MICROgram(s) Inhaler 2 Puff(s) Inhalation daily    MEDICATIONS  (PRN):  albuterol    90 MICROgram(s) HFA Inhaler 2 Puff(s) Inhalation every 6 hours PRN Shortness of Breath and/or Wheezing    Vital Signs Last 24 Hrs  T(F): 97.4 (22 Sep 2023 04:53), Max: 98.4 (21 Sep 2023 21:37)  HR: 82 (22 Sep 2023 07:45) (73 - 82)  BP: 154/77 (22 Sep 2023 04:53) (139/74 - 154/77)  RR: 17 (22 Sep 2023 04:53) (17 - 18)  SpO2: 97% (22 Sep 2023 07:45) (94% - 99%)  I&O's Summary    21 Sep 2023 07:01  -  22 Sep 2023 07:00  --------------------------------------------------------  IN: 1000 mL / OUT: 400 mL / NET: 600 mL    22 Sep 2023 07:01  -  22 Sep 2023 11:51  --------------------------------------------------------  IN: 400 mL / OUT: 350 mL / NET: 50 mL      PHYSICAL EXAM:  General: NAD, A/O x 3  ENT: No gross hearing impairment, Moist mucous membranes, no thrush  Neck: Supple, No JVD  Lungs:  non-labored breathing, decreased breath sounds L>R  Cardio: irreg, No murmur  Abdomen: Soft, Nontender, Nondistended; Bowel sounds present  Extremities: No calf tenderness, No cyanosis, 1+ pitting edema at lower exts  Psych: Appropriate mood and affect    LABS:                        11.8   8.89  )-----------( 232      ( 22 Sep 2023 07:20 )             35.8     09-22    136  |  99  |  49  ----------------------------<  112  3.8   |  29  |  1.50    Ca    8.7      22 Sep 2023 07:20  Mg     2.1     09-21 09-19 Chol 128 mg/dL LDL -- HDL 56 mg/dL Trig 54 mg/dL  TSH 0.935   TSH with FT4 reflex --  Total T3 --                  Urinalysis Basic - ( 22 Sep 2023 07:20 )    Color: x / Appearance: x / SG: x / pH: x  Gluc: 112 mg/dL / Ketone: x  / Bili: x / Urobili: x   Blood: x / Protein: x / Nitrite: x   Leuk Esterase: x / RBC: x / WBC x   Sq Epi: x / Non Sq Epi: x / Bacteria: x          RADIOLOGY & ADDITIONAL TESTS:  < from: US Renal (09.21.23 @ 14:51) >    IMPRESSION:  Multiple right renal cysts one of which corresponds to lesion questioned   on the CT scan. No suspicious renal mass.      < end of copied text >  < from: TTE Echo Complete w/o Contrast w/ Doppler (09.18.23 @ 16:51) >    Summary:   1. Low normal global left ventricular systolic function.   2. Left ventricular ejection fraction, by visual estimation, is 50%.   3. Increased LV wall thickness.   4. Normal left ventricular internal cavity size.   5. Normal right ventricular size and function.   6. Mildly enlarged left atrium.   7. Mildly enlarged right atrium.   8. Mild thickening and calcification of the anterior and posterior   mitral valve leaflets.   9. Moderately decreased mitral leaflet mobility.  10. 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  11. Mild mitral valve regurgitation.  12. Mild tricuspid regurgitation.  13. The aortic valve is not well visualized, appears heavily calcified.  14.Sclerotic aortic valve with normal opening.  15. Mild aortic regurgitation.  16. Large pleural effusion in the left lateral region.  17. Trivial pericardial effusion.  18. There is a significant pericardial fat pad present.    < end of copied text >    Care Discussed with Consultants/Other Providers:

## 2023-09-22 NOTE — PROGRESS NOTE ADULT - ASSESSMENT
Physical Examination:  GENERAL:               Alert, Oriented, No acute distress.    HEENT:                    No JVD, dry MM  PULM:                     Bilateral air entry, Clear to auscultation bilaterally, no Rales, No Rhonchi, No Wheezing  CVS:                         S1, S2,  +Murmur  ABD:                        Soft, nondistended, nontender, normoactive bowel sounds,   EXT:                         + trace edema, nontender, No Cyanosis or Clubbing   Vascular:                Warm Extremities,   NEURO:                  Alert, oriented, interactive, nonfocal, follows commands  PSYC:                      Calm, + Insight.    Assessment  Acute on chronic  Diastolic CHF     improving with Diuresis  Based on CT Small/moderate size left pleural effusion too small to drain, and may pose increased risk than benefit  Elevated DDimer -  is non specific      no evidence of VTE - VQ scan is intermediate probability and LE dopplers negative   Afib on eliquis  Cigar smoker, at risk for copd.     Plan  Imaging reviewed doubt VTE  clinically patient is improved with diuresis as now on room air oxygen  Echo with out reported pulmonary hypertension, and has a normal RV function and size   Repeat CXR remains with left sided opacification  Defer thoracentesis as patient on anticoagulation  Patient does complain of orthopnea that does suggest cardiac component.     Cont. diuresis and maintain euvolemic volume status  Cont. Symbicort and spiriva  low suspicion of VTE at this time will continue eliquis at current dose.

## 2023-09-22 NOTE — DISCHARGE NOTE PROVIDER - NSDCQMSTAIRS_GEN_ALL_CORE
No new care gaps identified.  Powered by [employer] by Practice Management [employer]. Reference number: [id number].   4/05/2022 5:49:11 PM CDT  
Yes

## 2023-09-22 NOTE — PROGRESS NOTE ADULT - SUBJECTIVE AND OBJECTIVE BOX
Follow-up Pulmonary Progress Note  Chief Complaint : Chest pain    Endorsing dyspnea on exertion      Allergies :No Known Allergies      PAST MEDICAL & SURGICAL HISTORY:  Afib    Congestive heart failure (CHF)    CVA (cerebral vascular accident)    Hypertension, unspecified type    No significant past surgical history        Medications:  MEDICATIONS  (STANDING):  allopurinol 100 milliGRAM(s) Oral daily  apixaban 2.5 milliGRAM(s) Oral every 12 hours  aspirin enteric coated 81 milliGRAM(s) Oral daily  atorvastatin 40 milliGRAM(s) Oral at bedtime  budesonide 160 MICROgram(s)/formoterol 4.5 MICROgram(s) Inhaler 2 Puff(s) Inhalation two times a day  furosemide    Tablet 40 milliGRAM(s) Oral two times a day  hydrALAZINE 25 milliGRAM(s) Oral every 8 hours  melatonin 6 milliGRAM(s) Oral at bedtime  metoprolol succinate ER 50 milliGRAM(s) Oral daily  multivitamin 1 Tablet(s) Oral daily  pantoprazole    Tablet 40 milliGRAM(s) Oral before breakfast  polyethylene glycol 3350 17 Gram(s) Oral daily  senna 2 Tablet(s) Oral at bedtime  tiotropium 2.5 MICROgram(s) Inhaler 2 Puff(s) Inhalation daily    MEDICATIONS  (PRN):  albuterol    90 MICROgram(s) HFA Inhaler 2 Puff(s) Inhalation every 6 hours PRN Shortness of Breath and/or Wheezing      Antibiotics History      Heme Medications   apixaban 2.5 milliGRAM(s) Oral every 12 hours, 09-18-23 @ 18:00  aspirin enteric coated 81 milliGRAM(s) Oral daily, 09-18-23 @ 14:30      GI Medications  pantoprazole    Tablet 40 milliGRAM(s) Oral before breakfast, 09-18-23 @ 14:31, Routine  polyethylene glycol 3350 17 Gram(s) Oral daily, 09-18-23 @ 14:39, Routine  senna 2 Tablet(s) Oral at bedtime, 09-18-23 @ 14:39, Routine        LABS:                        11.8   8.89  )-----------( 232      ( 22 Sep 2023 07:20 )             35.8     09-22    136  |  99  |  49<H>  ----------------------------<  112<H>  3.8   |  29  |  1.50<H>    Ca    8.7      22 Sep 2023 07:20  Mg     2.1     09-21      HIT ab -- 09-18 @ 15:00  HIT ab EIA --  D Dimer -[id number]            Urinalysis Basic - ( 22 Sep 2023 07:20 )    Color: x / Appearance: x / SG: x / pH: x  Gluc: 112 mg/dL / Ketone: x  / Bili: x / Urobili: x   Blood: x / Protein: x / Nitrite: x   Leuk Esterase: x / RBC: x / WBC x   Sq Epi: x / Non Sq Epi: x / Bacteria: x              CULTURES: (if applicable)    Rapid RVP Result: Detected (09-18-23 @ 19:50)        CAPILLARY BLOOD GLUCOSE          RADIOLOGY  CXR:      CT:    ECHO:      VITALS:  T(C): 36.3 (09-22-23 @ 04:53), Max: 36.9 (09-21-23 @ 21:37)  T(F): 97.4 (09-22-23 @ 04:53), Max: 98.4 (09-21-23 @ 21:37)  HR: 82 (09-22-23 @ 07:45) (73 - 82)  BP: 154/77 (09-22-23 @ 04:53) (139/74 - 154/77)  BP(mean): --  ABP: --  ABP(mean): --  RR: 17 (09-22-23 @ 04:53) (17 - 18)  SpO2: 97% (09-22-23 @ 07:45) (94% - 99%)  CVP(mm Hg): --  CVP(cm H2O): --    Ins and Outs     09-21-23 @ 07:01  -  09-22-23 @ 07:00  --------------------------------------------------------  IN: 1000 mL / OUT: 400 mL / NET: 600 mL    09-22-23 @ 07:01  -  09-22-23 @ 12:24  --------------------------------------------------------  IN: 400 mL / OUT: 350 mL / NET: 50 mL                I&O's Detail    21 Sep 2023 07:01  -  22 Sep 2023 07:00  --------------------------------------------------------  IN:    Oral Fluid: 1000 mL  Total IN: 1000 mL    OUT:    Voided (mL): 400 mL  Total OUT: 400 mL    Total NET: 600 mL      22 Sep 2023 07:01  -  22 Sep 2023 12:24  --------------------------------------------------------  IN:    Oral Fluid: 400 mL  Total IN: 400 mL    OUT:    Voided (mL): 350 mL  Total OUT: 350 mL    Total NET: 50 mL

## 2023-09-22 NOTE — DISCHARGE NOTE PROVIDER - PROVIDER TOKENS
PROVIDER:[TOKEN:[[id number]]] PROVIDER:[TOKEN:[[id number]]],PROVIDER:[TOKEN:[[id number]]],PROVIDER:[TOKEN:[[id number]]]

## 2023-09-22 NOTE — DISCHARGE NOTE PROVIDER - CARE PROVIDER_API CALL
Charlie Duval.  Internal Medicine  [address] [id number]  Phone: [phone]  Fax: [phone]  Follow Up Time:    Charlie Duval  Internal Medicine  [address] [place] [address]  Phone: [phone]  Fax: [phone]  Follow Up Time:     Kevin Hong  Cardiovascular Disease  [address]  [place] [address]  Phone: [phone]  Fax: [phone]  Follow Up Time:     Chapo Hernandez  Critical Care Medicine  [address] [facility] [address]  [place] [address]  Phone: [phone]  Fax: [phone]  Follow Up Time:

## 2023-09-23 NOTE — PROGRESS NOTE ADULT - NS ATTEND AMEND GEN_ALL_CORE FT
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     #Acute hypoxic respiratory failure   #Adenovirus  #Acute/Chronic combined CHF (EF 50%)   - acute resp failure multifactorial- due to CHF exacerbation and adenovirus  - current on RA, saturating well  - lasix transitioned to PO  - repeat CXR with persistent L sided pleural effusion, proBNP stable  - low suspicion for PE as per pulm, continue current AC  - PT evaluated- saturated well with ambulation 94%  - pulm recs appreciated, discussed with Dr. Hernandez- will get US chest to evaluate fluid  - defer thoracentesis for now as per pulm due to patient being on AC, and patient does not want  - cardio recs appreciated, discussed with Dr. Bangura  - PT recommend HCPT    #renal cyst  - renal US reviewed, no signs of suspicious lesions

## 2023-09-23 NOTE — PROGRESS NOTE ADULT - ASSESSMENT
Physical Examination:  GENERAL:               Alert, Oriented, No acute distress.    HEENT:                    No JVD, dry MM  PULM:                     Bilateral air entry, Clear to auscultation bilaterally, no Rales, No Rhonchi, No Wheezing  CVS:                         S1, S2,  +Murmur  ABD:                        Soft, nondistended, nontender, normoactive bowel sounds,   EXT:                         + trace edema, nontender, No Cyanosis or Clubbing   Vascular:                Warm Extremities,   NEURO:                  Alert, oriented, interactive, nonfocal, follows commands  PSYC:                      Calm, + Insight.    Assessment  Acute on chronic  Diastolic CHF     improving with Diuresis  Based on CT Small/moderate size left pleural effusion too small to drain, and may pose increased risk than benefit  Elevated DDimer -  is non specific      no evidence of VTE - VQ scan is intermediate probability and LE dopplers negative   Afib on eliquis  RVP + Adenovirus   Cigar smoker, at risk for copd.     Plan  Imaging reviewed doubt VTE  clinically patient is improved with diuresis as now on room air oxygen  Echo with out reported pulmonary hypertension, and has a normal RV function and size   Repeat CXR remains with left sided opacification will get US chest to further evaluate  Defer thoracentesis as patient on anticoagulation and patient does not want  low suspicion of VTE at this time will continue Eliquis at current dose.   D/w Dr. Aguilar

## 2023-09-23 NOTE — PROGRESS NOTE ADULT - SUBJECTIVE AND OBJECTIVE BOX
Follow-up Pulmonary Progress Note  Chief Complaint : Chest pain        patient seen and examined  complain of sob on exertion  no hypoxia now and off o2.   discussed with patient possible procedure or nebs for sob, he states not inrested in any procedure.     states nebs have not helped him in past    Allergies :No Known Allergies      PAST MEDICAL & SURGICAL HISTORY:  Afib  Congestive heart failure (CHF)  CVA (cerebral vascular accident)  Hypertension, unspecified type  No significant past surgical history        Medications:  MEDICATIONS  (STANDING):  allopurinol 100 milliGRAM(s) Oral daily  apixaban 2.5 milliGRAM(s) Oral every 12 hours  aspirin enteric coated 81 milliGRAM(s) Oral daily  atorvastatin 40 milliGRAM(s) Oral at bedtime  budesonide 160 MICROgram(s)/formoterol 4.5 MICROgram(s) Inhaler 2 Puff(s) Inhalation two times a day  dapagliflozin 10 milliGRAM(s) Oral every 24 hours  furosemide    Tablet 40 milliGRAM(s) Oral two times a day  hydrALAZINE 25 milliGRAM(s) Oral every 8 hours  melatonin 6 milliGRAM(s) Oral at bedtime  metoprolol succinate ER 50 milliGRAM(s) Oral daily  multivitamin 1 Tablet(s) Oral daily  pantoprazole    Tablet 40 milliGRAM(s) Oral before breakfast  polyethylene glycol 3350 17 Gram(s) Oral daily  senna 2 Tablet(s) Oral at bedtime  tiotropium 2.5 MICROgram(s) Inhaler 2 Puff(s) Inhalation daily    MEDICATIONS  (PRN):  albuterol    90 MICROgram(s) HFA Inhaler 2 Puff(s) Inhalation every 6 hours PRN Shortness of Breath and/or Wheezing      Antibiotics History      Heme Medications   apixaban 2.5 milliGRAM(s) Oral every 12 hours, 09-18-23 @ 18:00  aspirin enteric coated 81 milliGRAM(s) Oral daily, 09-18-23 @ 14:30      GI Medications  pantoprazole    Tablet 40 milliGRAM(s) Oral before breakfast, 09-18-23 @ 14:31, Routine  polyethylene glycol 3350 17 Gram(s) Oral daily, 09-18-23 @ 14:39, Routine  senna 2 Tablet(s) Oral at bedtime, 09-18-23 @ 14:39, Routine        LABS:                        12.7   9.44  )-----------( 273      ( 23 Sep 2023 07:00 )             39.2     09-23    139  |  100  |  46<H>  ----------------------------<  156<H>  3.8   |  29  |  1.65<H>    Ca    8.8      23 Sep 2023 07:00      HIT ab -- 09-18 @ 15:00  HIT ab EIA --  D Dimer -[id number]         RADIOLOGY  CXR:     < from: Xray Chest 1 View- PORTABLE-Routine (Xray Chest 1 View- PORTABLE-Routine in AM.) (09.22.23 @ 09:14) >    INTERPRETATION:  AP chest on September 22, 2023 at 9:15 AM. Patient is   being followed for left effusion.    Heart is quite enlarged.    There is a persistent moderate left base pleural pulmonary reaction.    Advanced left shoulder degeneration is seen.    Chest is similar to September 18.    IMPRESSION: Unchanged chest as above.    --- End of Report ---    < end of copied text >      VITALS:  T(C): 36.4 (09-23-23 @ 05:21), Max: 36.6 (09-22-23 @ 16:22)  T(F): 97.6 (09-23-23 @ 05:21), Max: 97.8 (09-22-23 @ 16:22)  HR: 83 (09-23-23 @ 05:21) (74 - 83)  BP: 152/79 (09-23-23 @ 05:21) (150/75 - 154/69)  BP(mean): --  ABP: --  ABP(mean): --  RR: 16 (09-23-23 @ 05:21) (16 - 18)  SpO2: 96% (09-23-23 @ 05:21) (96% - 97%)  CVP(mm Hg): --  CVP(cm H2O): --    Ins and Outs     09-22-23 @ 07:01  -  09-23-23 @ 07:00  --------------------------------------------------------  IN: 1240 mL / OUT: 950 mL / NET: 290 mL                I&O's Detail    22 Sep 2023 07:01  -  23 Sep 2023 07:00  --------------------------------------------------------  IN:    Oral Fluid: 1240 mL  Total IN: 1240 mL    OUT:    Voided (mL): 950 mL  Total OUT: 950 mL    Total NET: 290 mL

## 2023-09-23 NOTE — PROGRESS NOTE ADULT - ASSESSMENT
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     *Acute hypoxic respiratory failure possibly due to Adenovirus  *Chest Pain   *Acute hypoxic respiratory failure   *Acute/Chronic combined CHF (EF 50%)   *Chronic Afib on long term anticoagulation  *NSVT  - repeat CXR done today, appears to still have left pleural effusion--await Radiology reading  - overnight on tele monitor pt. had 5 bts of NSVT, cont to monitor-- cont BB  - Trops x2 neg  -CT chest on 9/19:  Small/moderate size left pleural effusion.  Lingular and left basilar parenchymal opacification favored to represent  atelectasis. Partially imaged 6.2 cm right renal lesion; this lesion is indeterminate, but could represent a hemorrhagic/proteinaceous cyst. Obtain US chest to further evaluate.   - Cardiology following; cont current diuresis  - Pulm following  - V/Q scan findings noted- is on Eliquis 2.5mg BID for Afib.--low suspicion for PE, Dopplers with no DVT's  - noted TSH and Lipid panel   - Cont ASA, Statin   - Cont Diuresis, on Lasix 40 mg po bid  - PT eval rec is for home PT  - DASH diet    *Adenovirus   - C/w contact and respiratory Droplet precautions  - Supportive care    *HTN  -Cont Toprol XL 50mg daily, hydralazine 25mg q 8hrs    *COPD  -Chronic, stable  -Per patient, not on home O2  -no supplemental O2 needs  -Continue home meds - Spiriva qd, Symbicort bid, albuterol q6h prn    *CKD3   -avoid nephrotoxins, monitor BMP    *Renal Cyst- Incidental finding   Noted on CT as above  Renal u/s pending to better eval     *Hypokalemia- resolved  -monitor electrolytes and replete as needed    DVT ppx: Eliquis    Code Status: Full Code    Dispo:  updated Daughter on phone today; Frida [phone]

## 2023-09-23 NOTE — PROGRESS NOTE ADULT - SUBJECTIVE AND OBJECTIVE BOX
Follow up for  SUBJ:  persistent sob    PMH  Afib    Congestive heart failure (CHF)    CVA (cerebral vascular accident)    Hypertension, unspecified type        MEDICATIONS  (STANDING):  allopurinol 100 milliGRAM(s) Oral daily  apixaban 2.5 milliGRAM(s) Oral every 12 hours  aspirin enteric coated 81 milliGRAM(s) Oral daily  atorvastatin 40 milliGRAM(s) Oral at bedtime  budesonide 160 MICROgram(s)/formoterol 4.5 MICROgram(s) Inhaler 2 Puff(s) Inhalation two times a day  dapagliflozin 10 milliGRAM(s) Oral every 24 hours  furosemide    Tablet 40 milliGRAM(s) Oral two times a day  hydrALAZINE 25 milliGRAM(s) Oral every 8 hours  melatonin 6 milliGRAM(s) Oral at bedtime  metoprolol succinate ER 50 milliGRAM(s) Oral daily  multivitamin 1 Tablet(s) Oral daily  pantoprazole    Tablet 40 milliGRAM(s) Oral before breakfast  polyethylene glycol 3350 17 Gram(s) Oral daily  senna 2 Tablet(s) Oral at bedtime  tiotropium 2.5 MICROgram(s) Inhaler 2 Puff(s) Inhalation daily    MEDICATIONS  (PRN):  albuterol    90 MICROgram(s) HFA Inhaler 2 Puff(s) Inhalation every 6 hours PRN Shortness of Breath and/or Wheezing        PHYSICAL EXAM:  Vital Signs Last 24 Hrs  T(C): 36.8 (23 Sep 2023 16:25), Max: 36.8 (23 Sep 2023 16:25)  T(F): 98.3 (23 Sep 2023 16:25), Max: 98.3 (23 Sep 2023 16:25)  HR: 77 (23 Sep 2023 16:25) (77 - 83)  BP: 136/67 (23 Sep 2023 16:25) (136/67 - 154/69)  BP(mean): --  RR: 18 (23 Sep 2023 16:25) (16 - 18)  SpO2: 97% (23 Sep 2023 16:25) (96% - 97%)    Parameters below as of 23 Sep 2023 16:25  Patient On (Oxygen Delivery Method): room air        GENERAL: NAD, chronically appearing elderly gentleman family daughter and son in law at bedside   HEAD:  Atraumatic, Normocephalic  EYES: EOMI, PERRLA, conjunctiva and sclera clear  ENT: Moist mucous membranes,  NECK: Supple, No JVD, no bruits  CHEST/LUNG: Clear to percussion bilaterally; No rales, rhonchi, wheezing, or rubs  HEART: Regular rate and rhythm; No murmurs, rubs, or gallops PMI non displaced.  ABDOMEN: Soft, Nontender, Nondistended; Bowel sounds present  EXTREMITIES:  2+ Peripheral Pulses, No clubbing, cyanosis, or edema  SKIN: No rashes or lesions  NERVOUS SYSTEM:  Cranial Nerves II-XII intact      TELEMETRY:    ECG:  ECHO:    < from: TTE Echo Complete w/o Contrast w/ Doppler (09.18.23 @ 16:51) >    Summary:   1. Low normal global left ventricular systolic function.   2. Left ventricular ejection fraction, by visual estimation, is 50%.   3. Increased LV wall thickness.   4. Normal left ventricular internal cavity size.   5. Normal right ventricular size and function.   6. Mildly enlarged left atrium.   7. Mildly enlarged right atrium.   8. Mild thickening and calcification of the anterior and posterior   mitral valve leaflets.   9. Moderately decreased mitral leaflet mobility.  10. 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  11. Mild mitral valve regurgitation.  12. Mild tricuspid regurgitation.  13. The aortic valve is not well visualized, appears heavily calcified.  14.Sclerotic aortic valve with normal opening.  15. Mild aortic regurgitation.  16. Large pleural effusion in the left lateral region.  17. Trivial pericardial effusion.  18. There is a significant pericardial fat pad present.    [id number] [facility] CaliforniaCathryn Hong MD Electronically signed on   9/18/2023 at 5:50:28 PM        *** Final ***    < end of copied text >      LABS:                        12.7   9.44  )-----------( 273      ( 23 Sep 2023 07:00 )             39.2     09-23    139  |  100  |  46<H>  ----------------------------<  156<H>  3.8   |  29  |  1.65<H>    Ca    8.8      23 Sep 2023 07:00        I&O's Summary    22 Sep 2023 07:01  -  23 Sep 2023 07:00  --------------------------------------------------------  IN: 1240 mL / OUT: 950 mL / NET: 290 mL    23 Sep 2023 07:01  -  23 Sep 2023 18:46  --------------------------------------------------------  IN: 640 mL / OUT: 1000 mL / NET: -360 mL      BNP    RADIOLOGY & ADDITIONAL STUDIES:    ECHO:    dyspnea  hemoglobin 12.7 hematocrit 39 BUN 46 creatinine 1.7 BNP 2902  suspect  multifactorial dyspnea due to   positive adenovirus intermediate VQ scan although  pulmonary embolism felt to be less likely.  Atrial fibrillation left pleural effusion diastolic heart failure with mitral stenosis ? rheumatic basis mva 1.4cm2   receiving oral furosemide  undergoing ultrasound .  wide-complex tachycardia noted . currently on anticoagulation 2.5 twice daily     discussed with Dr. Manuel Buckley and Drew Arana and Dr. Zaldivar and family members at bedside

## 2023-09-23 NOTE — PROGRESS NOTE ADULT - SUBJECTIVE AND OBJECTIVE BOX
Patient is a 89y old  Male who presents with a chief complaint of chest pain r/o ACS (22 Sep 2023 18:02)      Patient seen and examined at bedside. c/o continued SOB upon walking or certain movements otherwise denies complaints.     ALLERGIES:  No Known Allergies    MEDICATIONS  (STANDING):  allopurinol 100 milliGRAM(s) Oral daily  apixaban 2.5 milliGRAM(s) Oral every 12 hours  aspirin enteric coated 81 milliGRAM(s) Oral daily  atorvastatin 40 milliGRAM(s) Oral at bedtime  budesonide 160 MICROgram(s)/formoterol 4.5 MICROgram(s) Inhaler 2 Puff(s) Inhalation two times a day  dapagliflozin 10 milliGRAM(s) Oral every 24 hours  furosemide    Tablet 40 milliGRAM(s) Oral two times a day  hydrALAZINE 25 milliGRAM(s) Oral every 8 hours  melatonin 6 milliGRAM(s) Oral at bedtime  metoprolol succinate ER 50 milliGRAM(s) Oral daily  multivitamin 1 Tablet(s) Oral daily  pantoprazole    Tablet 40 milliGRAM(s) Oral before breakfast  polyethylene glycol 3350 17 Gram(s) Oral daily  senna 2 Tablet(s) Oral at bedtime  tiotropium 2.5 MICROgram(s) Inhaler 2 Puff(s) Inhalation daily    MEDICATIONS  (PRN):  albuterol    90 MICROgram(s) HFA Inhaler 2 Puff(s) Inhalation every 6 hours PRN Shortness of Breath and/or Wheezing    Vital Signs Last 24 Hrs  T(F): 97.6 (23 Sep 2023 05:21), Max: 97.8 (22 Sep 2023 16:22)  HR: 83 (23 Sep 2023 05:21) (74 - 83)  BP: 152/79 (23 Sep 2023 05:21) (150/75 - 154/69)  RR: 16 (23 Sep 2023 05:21) (16 - 18)  SpO2: 96% (23 Sep 2023 05:21) (96% - 97%)  I&O's Summary    22 Sep 2023 07:01  -  23 Sep 2023 07:00  --------------------------------------------------------  IN: 1240 mL / OUT: 950 mL / NET: 290 mL      PHYSICAL EXAM:  General: NAD, A/O x 3  ENT: MMM, no oral thrush   Neck: Supple, No JVD  Lungs: decreased bilaterally, non labored breathing  Cardio: RRR, S1/S2, No murmurs  Abdomen: Soft, Nontender, Nondistended; Bowel sounds present  Extremities: No calf tenderness, b/l LE 1-2+ pitting edema    LABS:                        12.7   9.44  )-----------( 273      ( 23 Sep 2023 07:00 )             39.2     09-23    139  |  100  |  46  ----------------------------<  156  3.8   |  29  |  1.65    Ca    8.8      23 Sep 2023 07:00  Mg     2.1     09-21                09-19 Chol 128 mg/dL LDL -- HDL 56 mg/dL Trig 54 mg/dL  TSH 0.935   TSH with FT4 reflex --  Total T3 --                  Urinalysis Basic - ( 23 Sep 2023 07:00 )    Color: x / Appearance: x / SG: x / pH: x  Gluc: 156 mg/dL / Ketone: x  / Bili: x / Urobili: x   Blood: x / Protein: x / Nitrite: x   Leuk Esterase: x / RBC: x / WBC x   Sq Epi: x / Non Sq Epi: x / Bacteria: x            RADIOLOGY & ADDITIONAL TESTS:    Care Discussed with Consultants/Other Providers:   Cardiology Dr. Bangura   Pulmonology Dr. Hernandez

## 2023-09-24 NOTE — PROGRESS NOTE ADULT - SUBJECTIVE AND OBJECTIVE BOX
RAD SINGLE[place]  [id number]      Chief Complaint: CHF/Adenovirus/Atrial fibrillation/Possible Pulmonary embolism     Interval History: The patient reports that his breathing has improved, off of oxygen.      Tele: atrial fibrillation 70s BPM, NSVT      Current meds:   albuterol    90 MICROgram(s) HFA Inhaler 2 Puff(s) Inhalation every 6 hours PRN  allopurinol 100 milliGRAM(s) Oral daily  apixaban 2.5 milliGRAM(s) Oral every 12 hours  aspirin enteric coated 81 milliGRAM(s) Oral daily  atorvastatin 40 milliGRAM(s) Oral at bedtime  budesonide 160 MICROgram(s)/formoterol 4.5 MICROgram(s) Inhaler 2 Puff(s) Inhalation two times a day  dapagliflozin 10 milliGRAM(s) Oral every 24 hours  furosemide    Tablet 40 milliGRAM(s) Oral two times a day  hydrALAZINE 25 milliGRAM(s) Oral every 8 hours  melatonin 6 milliGRAM(s) Oral at bedtime  metoprolol succinate ER 50 milliGRAM(s) Oral daily  multivitamin 1 Tablet(s) Oral daily  pantoprazole    Tablet 40 milliGRAM(s) Oral before breakfast  polyethylene glycol 3350 17 Gram(s) Oral daily  senna 2 Tablet(s) Oral at bedtime  tiotropium 2.5 MICROgram(s) Inhaler 2 Puff(s) Inhalation daily      Objective:     Vital Signs:   T(C): 36.7 (09-24-23 @ 05:12), Max: 36.9 (09-23-23 @ 19:20)  HR: 76 (09-24-23 @ 05:12) (70 - 77)  BP: 145/78 (09-24-23 @ 05:12) (136/67 - 165/81)  RR: 18 (09-24-23 @ 05:12) (18 - 18)  SpO2: 96% (09-24-23 @ 05:12) (95% - 97%)    Physical Exam:   General: no acute distress  HEENT: sclera anicteric  Neck: supple  CVS: JVP ~ 9 cm H20, irregularly irregular, s1, s2  Chest: decreased breath sounds   Extremities: no lower extremity edema b/l  Neuro: awake, alert & oriented  Psych: normal affect      Labs:   24 Sep 2023 07:40    138    |  101    |  42     ----------------------------<  131    3.7     |  30     |  1.46     Ca    9.0        24 Sep 2023 07:40                            12.7   9.44  )-----------( 273      ( 23 Sep 2023 07:00 )             39.2           TTE (12/17/22):  LVEF 45-50%, mild LVH, regional wall motion abnormalities  Moderate MR, Mild-moderate TR    TTE (9/18/23):   1. Low normal global left ventricular systolic function.   2. Left ventricular ejection fraction, by visual estimation, is 50%.   3. Increased LV wall thickness.   4. Normal left ventricular internal cavity size.   5. Normal right ventricular size and function.   6. Mildly enlarged left atrium.   7. Mildly enlarged right atrium.   8. Mild thickening and calcification of the anterior and posterior   mitral valve leaflets.   9. Moderately decreased mitral leaflet mobility.  10. 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 Moderate mitral valve stenosis (mean gradient 8 mmHg, HR not recorded, MVA 1.4 cm^2).  11. Mild mitral valve regurgitation.  12. Mild tricuspid regurgitation.  13. The aortic valve is not well visualized, appears heavily calcified.  14. Sclerotic aortic valve with normal opening.  15. Mild aortic regurgitation.  16. Large pleural effusion in the left lateral region.  17. Trivial pericardial effusion.  18. There is a significant pericardial fat pad present.      ECG (9/18/23): atrial fibrillation, lateral ST depressions (similar to prior ECG)

## 2023-09-24 NOTE — PROGRESS NOTE ADULT - SUBJECTIVE AND OBJECTIVE BOX
Follow-up Pulmonary Progress Note  Chief Complaint : Chest pain      patient seen and examined  comfortable  wants to go home  noted large left effusion, US reviewed  discussed role in thoracentesis to help sob he did not want any procedure wand wants to go home        Allergies :No Known Allergies      PAST MEDICAL & SURGICAL HISTORY:  Afib    Congestive heart failure (CHF)    CVA (cerebral vascular accident)    Hypertension, unspecified type    No significant past surgical history        Medications:  MEDICATIONS  (STANDING):  allopurinol 100 milliGRAM(s) Oral daily  apixaban 2.5 milliGRAM(s) Oral every 12 hours  aspirin enteric coated 81 milliGRAM(s) Oral daily  atorvastatin 40 milliGRAM(s) Oral at bedtime  budesonide 160 MICROgram(s)/formoterol 4.5 MICROgram(s) Inhaler 2 Puff(s) Inhalation two times a day  dapagliflozin 10 milliGRAM(s) Oral every 24 hours  furosemide    Tablet 40 milliGRAM(s) Oral two times a day  hydrALAZINE 25 milliGRAM(s) Oral every 8 hours  melatonin 6 milliGRAM(s) Oral at bedtime  metoprolol succinate ER 50 milliGRAM(s) Oral daily  multivitamin 1 Tablet(s) Oral daily  pantoprazole    Tablet 40 milliGRAM(s) Oral before breakfast  polyethylene glycol 3350 17 Gram(s) Oral daily  senna 2 Tablet(s) Oral at bedtime  tiotropium 2.5 MICROgram(s) Inhaler 2 Puff(s) Inhalation daily    MEDICATIONS  (PRN):  albuterol    90 MICROgram(s) HFA Inhaler 2 Puff(s) Inhalation every 6 hours PRN Shortness of Breath and/or Wheezing      Antibiotics History      Heme Medications   apixaban 2.5 milliGRAM(s) Oral every 12 hours, 09-18-23 @ 18:00  aspirin enteric coated 81 milliGRAM(s) Oral daily, 09-18-23 @ 14:30      GI Medications  pantoprazole    Tablet 40 milliGRAM(s) Oral before breakfast, 09-18-23 @ 14:31, Routine  polyethylene glycol 3350 17 Gram(s) Oral daily, 09-18-23 @ 14:39, Routine  senna 2 Tablet(s) Oral at bedtime, 09-18-23 @ 14:39, Routine        LABS:                        12.7   9.44  )-----------( 273      ( 23 Sep 2023 07:00 )             39.2     09-24    138  |  101  |  42<H>  ----------------------------<  131<H>  3.7   |  30  |  1.46<H>    Ca    9.0      24 Sep 2023 07:40      HIT ab -- 09-18 @ 15:00  HIT ab EIA --  D Dimer -[id number]           VITALS:  T(C): 36.7 (09-24-23 @ 05:12), Max: 36.9 (09-23-23 @ 19:20)  T(F): 98.1 (09-24-23 @ 05:12), Max: 98.5 (09-23-23 @ 19:20)  HR: 75 (09-24-23 @ 13:20) (70 - 77)  BP: 149/70 (09-24-23 @ 13:20) (136/67 - 165/81)  BP(mean): --  ABP: --  ABP(mean): --  RR: 18 (09-24-23 @ 05:12) (18 - 18)  SpO2: 96% (09-24-23 @ 05:12) (95% - 97%)  CVP(mm Hg): --  CVP(cm H2O): --    Ins and Outs     09-23-23 @ 07:01  -  09-24-23 @ 07:00  --------------------------------------------------------  IN: 880 mL / OUT: 1200 mL / NET: -320 mL    09-24-23 @ 07:01  -  09-24-23 @ 13:28  --------------------------------------------------------  IN: 0 mL / OUT: 400 mL / NET: -400 mL                I&O's Detail    23 Sep 2023 07:01  -  24 Sep 2023 07:00  --------------------------------------------------------  IN:    Oral Fluid: 880 mL  Total IN: 880 mL    OUT:    Voided (mL): 1200 mL  Total OUT: 1200 mL    Total NET: -320 mL      24 Sep 2023 07:01  -  24 Sep 2023 13:28  --------------------------------------------------------  IN:  Total IN: 0 mL    OUT:    Voided (mL): 400 mL  Total OUT: 400 mL    Total NET: -400 mL

## 2023-09-24 NOTE — PROGRESS NOTE ADULT - NS ATTEND AMEND GEN_ALL_CORE FT
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     #Acute hypoxic respiratory failure   #Adenovirus  #Acute/Chronic combined CHF (EF 50%)   - acute resp failure multifactorial- due to CHF exacerbation and adenovirus  - current on RA, saturating well  - lasix transitioned to PO  - repeat CXR with persistent L sided pleural effusion, proBNP stable  - low suspicion for PE as per pulm, continue current AC  - PT evaluated- saturated well with ambulation 94%  - pulm recs appreciated, discussed with Dr. Hernandez- will get US chest to evaluate fluid  - defer thoracentesis for now as per pulm due to patient being on AC, and patient does not want  - cardio recs appreciated  - PT recommend HCPT

## 2023-09-24 NOTE — PROGRESS NOTE ADULT - SUBJECTIVE AND OBJECTIVE BOX
Patient is a 89y old  Male who presents with a chief complaint of chest pain r/o ACS (24 Sep 2023 10:08)      Patient seen and examined at bedside. No overnight events reported.  He feels overall his breathing is better, he is eager to go home.    ALLERGIES:  No Known Allergies    MEDICATIONS  (STANDING):  allopurinol 100 milliGRAM(s) Oral daily  apixaban 2.5 milliGRAM(s) Oral every 12 hours  aspirin enteric coated 81 milliGRAM(s) Oral daily  atorvastatin 40 milliGRAM(s) Oral at bedtime  budesonide 160 MICROgram(s)/formoterol 4.5 MICROgram(s) Inhaler 2 Puff(s) Inhalation two times a day  dapagliflozin 10 milliGRAM(s) Oral every 24 hours  furosemide    Tablet 40 milliGRAM(s) Oral two times a day  hydrALAZINE 25 milliGRAM(s) Oral every 8 hours  melatonin 6 milliGRAM(s) Oral at bedtime  metoprolol succinate ER 50 milliGRAM(s) Oral daily  multivitamin 1 Tablet(s) Oral daily  pantoprazole    Tablet 40 milliGRAM(s) Oral before breakfast  polyethylene glycol 3350 17 Gram(s) Oral daily  senna 2 Tablet(s) Oral at bedtime  tiotropium 2.5 MICROgram(s) Inhaler 2 Puff(s) Inhalation daily    MEDICATIONS  (PRN):  albuterol    90 MICROgram(s) HFA Inhaler 2 Puff(s) Inhalation every 6 hours PRN Shortness of Breath and/or Wheezing    Vital Signs Last 24 Hrs  T(F): 98.1 (24 Sep 2023 05:12), Max: 98.5 (23 Sep 2023 19:20)  HR: 76 (24 Sep 2023 05:12) (70 - 77)  BP: 145/78 (24 Sep 2023 05:12) (136/67 - 165/81)  RR: 18 (24 Sep 2023 05:12) (18 - 18)  SpO2: 96% (24 Sep 2023 05:12) (95% - 97%)  I&O's Summary    23 Sep 2023 07:01  -  24 Sep 2023 07:00  --------------------------------------------------------  IN: 880 mL / OUT: 1200 mL / NET: -320 mL    24 Sep 2023 07:01  -  24 Sep 2023 13:17  --------------------------------------------------------  IN: 0 mL / OUT: 400 mL / NET: -400 mL      PHYSICAL EXAM:  General: NAD, A/O x 3  ENT: No gross hearing impairment, Moist mucous membranes, no thrush  Neck: Supple, No JVD  Lungs: non-labored breathing, decreased breath sounds L>R  Cardio: irreg, No murmur  Abdomen: Soft, Nontender, Nondistended; Bowel sounds present  Extremities: No calf tenderness, No cyanosis, 1+pitting edema  Psych: Appropriate mood and affect    LABS:                        12.7   9.44  )-----------( 273      ( 23 Sep 2023 07:00 )             39.2     09-24    138  |  101  |  42  ----------------------------<  131  3.7   |  30  |  1.46    Ca    9.0      24 Sep 2023 07:40                      09-19 Chol 128 mg/dL LDL -- HDL 56 mg/dL Trig 54 mg/dL  TSH 0.935   TSH with FT4 reflex --  Total T3 --                  Urinalysis Basic - ( 24 Sep 2023 07:40 )    Color: x / Appearance: x / SG: x / pH: x  Gluc: 131 mg/dL / Ketone: x  / Bili: x / Urobili: x   Blood: x / Protein: x / Nitrite: x   Leuk Esterase: x / RBC: x / WBC x   Sq Epi: x / Non Sq Epi: x / Bacteria: x          RADIOLOGY & ADDITIONAL TESTS:  < from: Xray Chest 1 View- PORTABLE-Routine (Xray Chest 1 View- PORTABLE-Routine in AM.) (09.22.23 @ 09:14) >    INTERPRETATION:  AP chest on September 22, 2023 at 9:15 AM. Patient is   being followed for left effusion.    Heart is quite enlarged.    There is a persistent moderate left base pleural pulmonary reaction.    Advanced left shoulder degeneration is seen.    Chest is similar to September 18.    IMPRESSION: Unchanged chest as above.    < end of copied text >    Care Discussed with Consultants/Other Providers:

## 2023-09-24 NOTE — PROGRESS NOTE ADULT - ASSESSMENT
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     *Acute hypoxic respiratory failure possibly due to Adenovirus  *Chest Pain; RESOLVED  *Acute hypoxic respiratory failure; RESOLVED  *Acute/Chronic combined CHF (EF 50%)   *Chronic Afib on long term anticoagulation  *Episodes of NSVT  - repeat CXR done 9/22-- still with left pleural effusion  - U/S of chest pending  - overnight on tele monitor pt. had 8 bts of NSVT, cont to monitor-- cont BB  - Trops x 2 neg  - CT chest on 9/19:  Small/moderate size left pleural effusion.  Lingular and left basilar parenchymal opacification favored to represent  atelectasis. Partially imaged 6.2 cm right renal lesion; this lesion is indeterminate, but could represent a hemorrhagic/proteinaceous cyst.    - Cardiology following; cont current diuresis, Apixaban, BB, ASA and Statin  - Pulm following: defer thoracentesis for now, pt refusing and he is on Apixaban, f/u U/S chest  - V/Q scan findings noted- is on Eliquis 2.5mg BID for Afib.--low suspicion for PE, Dopplers with no DVT's  - noted TSH and Lipid panel   - PT eval rec is for home PT  - DASH diet    *Adenovirus   - C/w contact and respiratory Droplet precautions  - Supportive care    *HTN  -Cont Toprol XL 50mg daily, hydralazine 25mg q 8hrs    *COPD  -Chronic, stable  -Per patient, not on home O2  -no supplemental O2 needs  -Continue home meds - Spiriva qd, Symbicort bid, albuterol q6h prn    *CKD3   -avoid nephrotoxins, monitor BMP    *Renal Cyst- Incidental finding   Noted on CT as above  Renal u/s pending to better eval     *Hypokalemia- resolved  -monitor electrolytes and replete as needed    DVT ppx: Eliquis    Code Status: Full Code    Dispo:  updated Daughter on phone today; Frida [phone]   89yo male with PMH CVA, 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     *Acute hypoxic respiratory failure possibly due to Adenovirus  *Chest Pain; RESOLVED  *Acute hypoxic respiratory failure; RESOLVED  *Acute/Chronic combined CHF (EF 50%)   *Chronic Afib on long term anticoagulation  *Episodes of NSVT  - repeat CXR done 9/22-- still with left pleural effusion  - U/S of chest pending  - overnight on tele monitor pt. had 8 bts of NSVT, cont to monitor-- cont BB  - Trops x 2 neg  - CT chest on 9/19:  Small/moderate size left pleural effusion.  Lingular and left basilar parenchymal opacification favored to represent  atelectasis. Partially imaged 6.2 cm right renal lesion; this lesion is indeterminate, but could represent a hemorrhagic/proteinaceous cyst.    - Cardiology following; cont current diuresis, Apixaban, BB, ASA and Statin  - Pulm following: defer thoracentesis for now, pt refusing and he is on Apixaban, f/u U/S chest  - V/Q scan findings noted- is on Eliquis 2.5mg BID for Afib.--low suspicion for PE, Dopplers with no DVT's  - noted TSH and Lipid panel   - PT eval rec is for home PT  - DASH diet    *Adenovirus   - C/w contact and respiratory Droplet precautions  - Supportive care    *HTN  -Cont Toprol XL 50mg daily, hydralazine 25mg q 8hrs    *COPD  -Chronic, stable  -Per patient, not on home O2  -no supplemental O2 needs  -Continue home meds - Spiriva qd, Symbicort bid, albuterol q6h prn    *CKD3   -avoid nephrotoxins, monitor BMP    *Renal Cyst- Incidental finding   Noted on CT as above  Renal u/s pending to better eval     *Hypokalemia- resolved  -monitor electrolytes and replete as needed    DVT ppx: Eliquis    Code Status: Full Code    Dispo:  updated Daughter on phone today; Lora at [phone], anticipate D/C home tomorrow.

## 2023-09-24 NOTE — PROGRESS NOTE ADULT - ASSESSMENT
Physical Examination:  GENERAL:               Alert, Oriented, No acute distress.    HEENT:                    No JVD, dry MM  PULM:                     Bilateral air entry, Clear to auscultation bilaterally, no Rales, No Rhonchi, No Wheezing  CVS:                         S1, S2,  +Murmur  ABD:                        Soft, nondistended, nontender, normoactive bowel sounds,   EXT:                         + mod edema, nontender, No Cyanosis or Clubbing   Vascular:                Warm Extremities,   NEURO:                  Alert, oriented, interactive, nonfocal, follows commands  PSYC:                      Calm, + Insight.    Assessment  Acute on chronic  Diastolic CHF     improving with Diuresis  Based on CT Small/moderate size left pleural effusion too small to drain, and may pose increased risk than benefit  Elevated DDimer -  is non specific      no evidence of VTE - VQ scan is intermediate probability and LE dopplers negative   Afib on eliquis  RVP + Adenovirus   Cigar smoker, at risk for copd.     Plan  Imaging reviewed doubt VTE  clinically patient is improved with diuresis as now on room air oxygen  Echo with out reported pulmonary hypertension, and has a normal RV function and size   US Chest shows large left effusion - option of thoracentesis present if patient willing will need to hold a/c from day prior.   patient given option and wanted to go home.   Defer thoracentesis as patient on anticoagulation and patient does not want  low suspicion of VTE at this time will continue Eliquis at current dose.   D/w Dr. Aguilar

## 2023-09-24 NOTE — PROGRESS NOTE ADULT - ASSESSMENT
Assessment:  Jaylon Antony is an 89 year old man with past medical history of Atrial fibrillation (on Eliquis), HFrEF (LVEF 25-30% in 2019), Hypertension, Chronic kidney disease and history of CVA presents with acute onset of chest discomfort and shortness of breath, concerning for acute on chronic systolic heart failure exacerbation, also with adenovirus and possible pulmonary embolism.    ECG consistent with atrial fibrillation and lateral ST depressions which are generally similar to prior ECG. Troponin negative x 2. Pro BNP elevated but less than prior hospitalization. Echo consistent with low normal LVEF 50%, moderate mitral valve stenosis and elevated filling pressures.     Recommendations:  [] Chest discomfort: Leg US negative for DVT, however VQ scan with intermediate probability of PE, follow up Pulmonary final recommendations   [] HF: LVEF 50%, moderate mitral stenosis. S/p IV diuresis, now euvolemic and saturating on room air, continue Lasix 40 mg PO BID.  Continue guideline directed medical therapy; Metoprolol succinate 50 mg daily. Moderate mitral stenosis can be managed with outpatient surveillance echo imaging.   [] Atrial fibrillation: Rate controlled, occasional ventricular ectopy, continue beta blocker. Continue Eliquis for stroke prophylaxis.   [] Adenovirus: Supportive care per primary team    We will sign off, please re-consult if needed. The patient was recommended to follow up with his cardiologist in one week and he verbalized appropriate understanding.     Kevin Hong MD  Cardiology

## 2023-09-25 NOTE — DISCHARGE NOTE NURSING/CASE MANAGEMENT/SOCIAL WORK - NSDCFUADDAPPT_GEN_ALL_CORE_FT
2/11/2023  IPeter MD, saw and evaluated the patient  I have discussed the patient with the resident/non-physician practitioner and agree with the resident's/non-physician practitioner's findings, Plan of Care, and MDM as documented in the resident's/non-physician practitioner's note, except where noted  All available labs and Radiology studies were reviewed  I was present for key portions of any procedure(s) performed by the resident/non-physician practitioner and I was immediately available to provide assistance  At this point I agree with the current assessment done in the Emergency Department  I have conducted an independent evaluation of this patient a history and physical is as follows:    ED Course         Critical Care Time  Procedures    Patient is a pleasant 9 month old who presents after falling in the tub  Baby cried immediately  Acting normally  No n/v  No skull fracture  Lac to chin  No indication for CT      MDM pleasant 9 month old, will repair, will dc  follow-up appointment with Dr. Duval on Friday, 9/29 at 10am  Please bring discharge paperwork to appointment

## 2023-09-25 NOTE — DISCHARGE NOTE NURSING/CASE MANAGEMENT/SOCIAL WORK - NSDCPEFALRISK_GEN_ALL_CORE
For information on Fall & Injury Prevention, visit: [email or web address] OR  [email or web address] OR  https://www.cdc.gov/steadi/patient.html

## 2023-09-25 NOTE — DISCHARGE NOTE NURSING/CASE MANAGEMENT/SOCIAL WORK - PATIENT PORTAL LINK FT
You can access the FollowMyHealth Patient Portal offered by [facility] by registering at the following website: [email or web address]. By joining [employer]’s FollowMyHealth portal, you will also be able to view your health information using other applications (apps) compatible with our system.

## 2023-09-25 NOTE — PROGRESS NOTE ADULT - NS ATTEND AMEND GEN_ALL_CORE FT
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     #Acute hypoxic respiratory failure   #Adenovirus  #Acute/Chronic combined CHF (EF 50%)   - acute resp failure multifactorial- due to CHF exacerbation and adenovirus  - current on RA, saturating well  - lasix transitioned to PO  - repeat CXR with persistent L sided pleural effusion, proBNP stable  - low suspicion for PE as per pulm, continue current AC  - PT evaluated- saturated well with ambulation 94%  - pulm recs appreciated, discussed with Dr. Hernandez- chest US with small amount pleural effusion  - defer thoracentesis for now as per pulm due to patient being on AC, and patient does not want- discussed at length with patient regarding loculated effusion and need for thoracentesis, showed him pictures of his xray on admission and from 2 days prior with no change in effusion size. Explained that this is likely what is causing his SOB to occur and will continue at home. Patient was aware and understood findings, continues to decline thoracentesis at this time  - cardio recs appreciated  - PT recommend HCPT

## 2023-09-25 NOTE — PROGRESS NOTE ADULT - REASON FOR ADMISSION
chest pain r/o ACS

## 2023-09-25 NOTE — PROGRESS NOTE ADULT - SUBJECTIVE AND OBJECTIVE BOX
Patient is a 89y old Male who presents with a chief complaint of chest pain r/o ACS (24 Sep 2023 13:25)      Patient seen and examined at bedside. No overnight events reported. Patient states he would like to go home, and does not want drainage of his pleural effusion.     ALLERGIES:  No Known Allergies    MEDICATIONS  (STANDING):  allopurinol 100 milliGRAM(s) Oral daily  apixaban 2.5 milliGRAM(s) Oral every 12 hours  aspirin enteric coated 81 milliGRAM(s) Oral daily  atorvastatin 40 milliGRAM(s) Oral at bedtime  budesonide 160 MICROgram(s)/formoterol 4.5 MICROgram(s) Inhaler 2 Puff(s) Inhalation two times a day  dapagliflozin 10 milliGRAM(s) Oral every 24 hours  furosemide    Tablet 40 milliGRAM(s) Oral two times a day  hydrALAZINE 25 milliGRAM(s) Oral every 8 hours  melatonin 6 milliGRAM(s) Oral at bedtime  metoprolol succinate ER 50 milliGRAM(s) Oral daily  multivitamin 1 Tablet(s) Oral daily  pantoprazole    Tablet 40 milliGRAM(s) Oral before breakfast  polyethylene glycol 3350 17 Gram(s) Oral daily  senna 2 Tablet(s) Oral at bedtime  tiotropium 2.5 MICROgram(s) Inhaler 2 Puff(s) Inhalation daily    MEDICATIONS  (PRN):  albuterol    90 MICROgram(s) HFA Inhaler 2 Puff(s) Inhalation every 6 hours PRN Shortness of Breath and/or Wheezing    Vital Signs Last 24 Hrs  T(F): 98.7 (25 Sep 2023 04:59), Max: 98.7 (25 Sep 2023 04:59)  HR: 79 (25 Sep 2023 07:13) (74 - 79)  BP: 124/65 (25 Sep 2023 04:59) (124/65 - 165/89)  RR: 18 (25 Sep 2023 04:59) (17 - 18)  SpO2: 96% (25 Sep 2023 07:13) (96% - 97%)  I&O's Summary    24 Sep 2023 07:01  -  25 Sep 2023 07:00  --------------------------------------------------------  IN: 480 mL / OUT: 1500 mL / NET: -1020 mL      PHYSICAL EXAM:  General: NAD, A/O x 3  ENT: No gross hearing impairment, Moist mucous membranes, no thrush  Neck: Supple, No JVD  Lungs: Decreased BS to auscultation bilaterally L>R, good air entry, non-labored breathing  Cardio: irregular RR, S1/S2, No murmur  Abdomen: Soft, Nontender, Nondistended; Bowel sounds present  Extremities: No calf tenderness, No cyanosis, 1+ b/l LE pitting edema  Psych: Appropriate mood and affect    LABS:                        12.7   9.44  )-----------( 273      ( 23 Sep 2023 07:00 )             39.2     09-24    138  |  101  |  42  ----------------------------<  131  3.7   |  30  |  1.46    Ca    9.0      24 Sep 2023 07:40                      09-19 Chol 128 mg/dL LDL -- HDL 56 mg/dL Trig 54 mg/dL                  Urinalysis Basic - ( 24 Sep 2023 07:40 )    Color: x / Appearance: x / SG: x / pH: x  Gluc: 131 mg/dL / Ketone: x  / Bili: x / Urobili: x   Blood: x / Protein: x / Nitrite: x   Leuk Esterase: x / RBC: x / WBC x   Sq Epi: x / Non Sq Epi: x / Bacteria: x          RADIOLOGY & ADDITIONAL TESTS:    Care Discussed with Consultants/Other Providers:

## 2023-09-25 NOTE — PROGRESS NOTE ADULT - PROVIDER SPECIALTY LIST ADULT
Cardiology
Hospitalist
Hospitalist
Pulmonology
Cardiology
Cardiology
Hospitalist
Pulmonology
Cardiology
Cardiology
Hospitalist
Hospitalist
Pulmonology
Pulmonology
Hospitalist
Hospitalist
Pulmonology

## 2023-09-25 NOTE — PROGRESS NOTE ADULT - ASSESSMENT
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     *Acute hypoxic respiratory failure possibly due to Adenovirus  *Chest Pain; RESOLVED  *Acute hypoxic respiratory failure; RESOLVED  *Acute/Chronic combined CHF (EF 50%)   *Chronic Afib on long term anticoagulation  *Episodes of NSVT  - repeat CXR done 9/22-- still with left pleural effusion  - US Chest shows large left effusion, however patient states he does not want it drained/thoracentesis performed at this time  - overnight on tele monitor pt. had some tacchycardia, which then resolved, no NSVT seen, cont to monitor-- cont BB  - Trops x 2 neg  - CT chest on 9/19:  Small/moderate size left pleural effusion.  Lingular and left basilar parenchymal opacification favored to represent  atelectasis. Partially imaged 6.2 cm right renal lesion; this lesion is indeterminate, but could represent a hemorrhagic/proteinaceous cyst.    - Cardiology following; cont current diuresis, Apixaban, BB, ASA and Statin  - Pulm following: defer thoracentesis for now, pt refusing and he is on Apixaban  - V/Q scan findings noted- is on Eliquis 2.5mg BID for Afib.--low suspicion for PE, Dopplers with no DVT's  - noted TSH and Lipid panel   - PT eval rec is for home PT  - DASH diet    *Adenovirus   - C/w contact and respiratory Droplet precautions  - Supportive care    *HTN  -Cont Toprol XL 50mg daily, hydralazine 25mg q 8hrs    *COPD  -Chronic, stable  -Per patient, not on home O2  -no supplemental O2 needs  -Continue home meds - Spiriva qd, Symbicort bid, albuterol q6h prn    *CKD3   -avoid nephrotoxins, monitor BMP    *Renal Cyst- Incidental finding   Noted on CT as above  Renal u/s multiple R renal cysts, no suspicious renal mass    *Hypokalemia- resolved  -monitor electrolytes and replete as needed    DVT ppx: Eliquis    Code Status: Full Code    Dispo:  updated Daughter on phone today; Lora at [phone], anticipate D/C home today.

## 2023-10-15 NOTE — ED ADULT TRIAGE NOTE - CHIEF COMPLAINT QUOTE
pt  c/o  sob and  self resolved chest discomfort    pt  was  d/c from [facility]  2 weeks ago fof chf/copd     he states he doctor wanted him on blood thinners but it was too expensive and his ins did nt cover it so he never took  it

## 2023-10-15 NOTE — ED ADULT NURSE REASSESSMENT NOTE - NS ED NURSE REASSESS COMMENT FT1
pt wanted to sit in chair, advised him to sit in bed with legs elevated but pt refused and insisted on sitting in the chair. Pt was able to ambulate with minimal assistance and is sitting at the bedside.

## 2023-10-15 NOTE — ED ADULT NURSE REASSESSMENT NOTE - NS ED NURSE REASSESS COMMENT FT1
Pt had some chest discomfort, Nitro sublingual pill provided, discomfort resolved. Pt had some chest discomfort at 2200, Nitro sublingual pill provided at 2250, chest discomfort resolved.

## 2023-10-15 NOTE — ED ADULT NURSE NOTE - NSFALLUNIVINTERV_ED_ALL_ED
Bed/Stretcher in lowest position, wheels locked, appropriate side rails in place/Call bell, personal items and telephone in reach/Instruct patient to call for assistance before getting out of bed/chair/stretcher/Non-slip footwear applied when patient is off stretcher/[place] to call system/Physically safe environment - no spills, clutter or unnecessary equipment/Purposeful proactive rounding/Room/bathroom lighting operational, light cord in reach

## 2023-10-15 NOTE — ED ADULT NURSE NOTE - CHIEF COMPLAINT QUOTE
pt  c/o  sob and  self resolved chest discomfort    pt  was  d/c from [facility]  2 weeks ago fof chf/copd     he states he doctor wanted him on blood thinners but it was too expensive and his ins did nt cover it so he never took  it

## 2023-10-16 NOTE — PROGRESS NOTE ADULT - SUBJECTIVE AND OBJECTIVE BOX
PROGRESS NOTE:     Patient is a 89y old  Male who presents with a chief complaint of Chest pain (16 Oct 2023 11:57)          SUBJECTIVE & OBJECTIVE:   Pt seen and examined at bedside  no overnight events.   reports feels at his baseline    REVIEW OF SYSTEMS: remaining ROS negative     PHYSICAL EXAM:  VITALS:  Vital Signs Last 24 Hrs  T(C): 36.8 (16 Oct 2023 05:30), Max: 37.1 (15 Oct 2023 21:52)  T(F): 98.2 (16 Oct 2023 05:30), Max: 98.8 (15 Oct 2023 21:52)  HR: 72 (16 Oct 2023 08:07) (60 - 80)  BP: 126/58 (16 Oct 2023 08:07) (126/58 - 176/92)  BP(mean): 77 (16 Oct 2023 08:07) (77 - 77)  RR: 22 (16 Oct 2023 08:07) (18 - 22)  SpO2: 97% (16 Oct 2023 08:07) (94% - 98%)    Parameters below as of 16 Oct 2023 08:07  Patient On (Oxygen Delivery Method): room air          GENERAL: NAD,  no increased WOB  HEAD:  Atraumatic, Normocephalic  EYES: EOMI, conjunctiva and sclera clear  ENMT: Moist mucous membranes  NECK: Supple  NERVOUS SYSTEM:  Alert & Oriented X3, no focal neuro deficits   CHEST/LUNG: Decrease breath sounds at left base,  No  rhonchi, wheezing  HEART: Regular rate and rhythm; No murmurs, rubs, or gallops  ABDOMEN: Soft, Nontender, Nondistended; Bowel sounds present  EXTREMITIES: +1 pitting edema, No clubbing, cyanosis      MEDICATIONS  (STANDING):  allopurinol 100 milliGRAM(s) Oral daily  apixaban 2.5 milliGRAM(s) Oral two times a day  aspirin enteric coated 81 milliGRAM(s) Oral daily  atorvastatin 40 milliGRAM(s) Oral at bedtime  budesonide 160 MICROgram(s)/formoterol 4.5 MICROgram(s) Inhaler 2 Puff(s) Inhalation two times a day  dapagliflozin 10 milliGRAM(s) Oral every 24 hours  furosemide   Injectable 40 milliGRAM(s) IV Push two times a day  hydrALAZINE 25 milliGRAM(s) Oral every 8 hours  metoprolol succinate ER 50 milliGRAM(s) Oral daily  multivitamin 1 Tablet(s) Oral daily  pantoprazole    Tablet 40 milliGRAM(s) Oral before breakfast  polyethylene glycol 3350 17 Gram(s) Oral daily  senna 1 Tablet(s) Oral at bedtime  tiotropium 2.5 MICROgram(s) Inhaler 2 Puff(s) Inhalation daily    MEDICATIONS  (PRN):  acetaminophen     Tablet .. 650 milliGRAM(s) Oral every 6 hours PRN Temp greater or equal to 38C (100.4F), Mild Pain (1 - 3)  albuterol    90 MICROgram(s) HFA Inhaler 2 Puff(s) Inhalation every 6 hours PRN Shortness of Breath and/or Wheezing  aluminum hydroxide/magnesium hydroxide/simethicone Suspension 30 milliLiter(s) Oral every 4 hours PRN Dyspepsia  melatonin 3 milliGRAM(s) Oral at bedtime PRN Insomnia  ondansetron Injectable 4 milliGRAM(s) IV Push every 8 hours PRN Nausea and/or Vomiting      Allergies    No Known Allergies    Intolerances              LABS:                           11.3   8.76  )-----------( 207      ( 16 Oct 2023 07:34 )             34.5     10-16    140  |  99  |  39<H>  ----------------------------<  101<H>  3.5   |  29  |  1.46<H>    Ca    8.4      16 Oct 2023 07:34  Mg     3.1     10-16    TPro  7.9  /  Alb  2.6<L>  /  TBili  0.7  /  DBili  x   /  AST  24  /  ALT  21  /  AlkPhos  69  10-16    PT/INR - ( 15 Oct 2023 22:30 )   PT: 12.5 sec;   INR: 1.07 ratio         PTT - ( 15 Oct 2023 22:30 )  PTT:32.4 sec  Urinalysis Basic - ( 16 Oct 2023 07:34 )    Color: x / Appearance: x / SG: x / pH: x  Gluc: 101 mg/dL / Ketone: x  / Bili: x / Urobili: x   Blood: x / Protein: x / Nitrite: x   Leuk Esterase: x / RBC: x / WBC x   Sq Epi: x / Non Sq Epi: x / Bacteria: x      CAPILLARY BLOOD GLUCOSE                    RECENT CULTURES:          RADIOLOGY & ADDITIONAL TESTS:          Consultant(s) Notes Reviewed:  [x ] YES  [ ] NO    Care Discussed with Consultants/Other Providers [x ] YES  [ ] NO  Care plan and all findings were discussed in detail with patient.  All questions and concerns addressed

## 2023-10-16 NOTE — PROGRESS NOTE ADULT - ASSESSMENT
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 Of note, Pt was last hospitalized 9/18/23 - 9/25/23  for acute hypoxic respiratory failure possibly due to Adenovirus and found to have large Left pleural effusion, which Pt had refused thoracentesis in fear of lung collapse. Pt also refers, chest tightness and orthopnea. Denies fever, chills, cough, abdominal pain, nausea, vomiting or urinary symptoms. Additional history was obtained from Pt's , Salena, who dropped off the Pt. She refers the Pt being more weak, sleepy and looking grayish these past few days along with the labored breathing. As per Salena, Pt's voice recently changed and the dysarthria isn't due to CVA. She states the Pt has bouts of feeling ok and then not well, which made her bring him in.    In the ED, Pt's VS on arrival T 98.8F, /56, HR 73, SpO2 94% on RA. Pt was given nitroglycerin for the chest tightness/discomfort. Labs pertinent for +Entero/Rhinovirus, Hgb 11.4, .1, normal PT and INR, D-dimer 1162, Cr 1.5, Albumin 2.7, Trop x1 neg and Pro-BNP 2833.  EKG Afib rate 74/min with nonspecific ST and T wave abnormality. CXR showed large left pleural effusion present on prior CXR. Pt is admitted for SOB    #Acute respiratory failure likely 2/2 pleural effusion, Rhinovirus  -last CXR 9/22 showed large pleural effusion on Left base of lung with cardiomegaly  -repeat CXR 10/16 showed unchanged left pleural effusion  -Entero/Rhinovirus +  -EKG: Afib with non specific changes, trops neg X3  -V/Q scan (9/23) negative for PE, U/s of LE negative for DVT  -supplemental O2 as needed  -Supportive care for rhinovirus  -Lasix 40mg IV X2 doses, then transition back to home PO dose  -Pt again refusing thoracentesis  -Consult: Cardiology, Pulmonology  -PT eval      #Chronic Afib  -EKG showed Afib rate 74/min with nonspecific ST and T wave abnormalities  -Not on AC, as per Pt, the medication is expensive and does nothing. When called pharmacy, no insurance on file. Pt reports he gets his meds vial mail order and Eliquis from his cardiologist  -PT, INR, PTT at normal levels  -Start on Eliquis 2.5mg BID given age and Cr function    #Acute on Chronic combined CHF  -last Echo 9/18/23 showed EF 50% with large left pleural effusion  -on farxiga, lasix,   -Consult Cardio      #Entero/Rhinovirus  -contact and droplet precautions  -supportive care    #h/o CVA  -on ASA, atorvastatin    #CKD  -baseline Cr approx 1.5  -on admission Cr 1.5  -Continue to monitor    #HTN  -on Toprol and hydralazine    #COPD  -not on home oxygen  -on spiriva, symbicort, and albuterol    #Prophylaxis  -DVT ppx: refusing thoracentesis, start on Eliquis  -GI ppx: on pantoprazole

## 2023-10-16 NOTE — ED PROVIDER NOTE - CLINICAL SUMMARY MEDICAL DECISION MAKING FREE TEXT BOX
PMH CVA, Chronic combined CHF (EF 45-50%), Chronic Afib, CKD3 admitted for chest pain r/o ACS.  RVP + for Adenovirus   p/w dyspnea, most likely secondary to know left sided pleural effusion, slightly worsening from previous xr  . Presentation is not consistent with acute cardiac etiologies (including but not limited to ACS (HEART SCORE  4), CHF exacerbation, pericardial effusion/cardiac tamponade), acute respiratory etiologies (including acute pulmonary embolism (Wells Score low risk), pneumothorax, asthma exacerbation, COPD exacerbation), allergic etiologies, infectious etiologies (including sepsis, pneumonia), or non-cardiopulmonary causes (including neurological causes such as demyelinating diseases, metabolic etiologies (including acidemia/electrolyte derrangements), and toxicological causes (including salicylate toxicity, massive overdose)  PLAN:   - Supplemental oxygen as needed, NIPPV as needed. Close hemodynamic monitoring.  - CXR, CBC/CMP, troponin negative, BNP elevated , D-dimer elevated but had a normal VQ scan during last admission in September 2023 doubt PE.   - discussed with patient that he will need a therapeutic thoracentesis to relieve his pleural effusion. he is willing to be admitted to have the procedure.

## 2023-10-16 NOTE — DISCHARGE NOTE PROVIDER - NSDCCPCAREPLAN_GEN_ALL_CORE_FT
PRINCIPAL DISCHARGE DIAGNOSIS  Diagnosis: Pleural effusion  Assessment and Plan of Treatment:       SECONDARY DISCHARGE DIAGNOSES  Diagnosis: SOB (shortness of breath)  Assessment and Plan of Treatment:

## 2023-10-16 NOTE — H&P ADULT - HISTORY OF PRESENT ILLNESS
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 Of note, Pt was last hospitalized 9/18/23 - 9/25/23  for acute hypoxic respiratory failure possibly due to Adenovirus and found to have large Left pleural effusion, which Pt had refused thoracentesis in fear of lung collapse. Pt also refers, chest tightness and orthopnea. Denies fever, chills, cough, abdominal pain, nausea, vomiting or urinary symptoms. Additional history was obtained from Pt's , Salena, who dropped off the Pt. She refers the Pt being more weak, sleepy and looking grayish these past few days along with the labored breathing. As per Salena, Pt's voice recently changed and the dysarthria isn't due to CVA. She states the Pt has bouts of feeling ok and then not well, which made her bring him in.    In the ED, Pt's VS on arrival T 98.8F, /56, HR 73, SpO2 94% on RA. Pt was given nitroglycerin for the chest tightness/discomfort. Labs pertinent for +Entero/Rhinovirus, Hgb 11.4, .1, normal PT and INR, D-dimer 1162, Cr 1.5, Albumin 2.7, Trop x1 neg and Pro-BNP 2833.  EKG Afib rate 74/min with nonspecific ST and T wave abnormality. CXR showed large left pleural effusion present on prior CXR.

## 2023-10-16 NOTE — CONSULT NOTE ADULT - ASSESSMENT
Assessment:  Jaylon Antony is an 89 year old man with past medical history of Atrial fibrillation (on Eliquis), HFrEF (LVEF 25-30% in 2019), Hypertension, Chronic kidney disease, COPD and history of CVA with recent hospitalization here in September for congestive heart failure and adenovirus, and possible pulmonary embolism, now presents with shortness of breath and fatigue, found to have entero/rhinovirus.       INCOMPLETE   Recommendations:  [] Chest discomfort: Leg US negative for DVT, however VQ scan with intermediate probability of PE, follow up Pulmonary final recommendations   [] HF: LVEF 50%, moderate mitral stenosis. S/p IV diuresis, now euvolemic and saturating on room air, continue Lasix 40 mg PO BID.  Continue guideline directed medical therapy; Metoprolol succinate 50 mg daily. Moderate mitral stenosis can be managed with outpatient surveillance echo imaging.   [] Atrial fibrillation: Rate controlled, occasional ventricular ectopy, continue beta blocker. Continue Eliquis for stroke prophylaxis.   [] Adenovirus: Supportive care per primary team   Assessment:  Jaylon Antony is an 89 year old man with past medical history of Atrial fibrillation (on Eliquis), HFrEF (LVEF 25-30% in 2019), Hypertension, Chronic kidney disease, COPD and history of CVA with recent hospitalization here in September for congestive heart failure and adenovirus, and possible pulmonary embolism, now presents with chest discomfort and shortness of breath, found to have entero/rhinovirus.     ECG consistent with atrial fibrillation. Troponins negative x 3, does not appear to be an acute coronary syndrome. Pro BNP elevated which is chronic. D-dimer markedly elevated. Recent echocardiogram last month with low normal LVEF 50%, normal RV size and function, moderate mitral valve stenosis. Overall, the patient does not appear to be in decompensated heart failure at this time, symptoms likely related to COPD and viral URI.    Recommendations:  [] Cardiomyopathy: LVEF 50%, moderate mitral stenosis. Receiving IV diuresis, would consider transitioning to home Lasix 40 mg PO BID in 24 hours. Continue guideline directed medical therapy; Metoprolol succinate 50 mg daily, Farxiga 10 mg daily. Moderate mitral stenosis can be managed with outpatient surveillance echo imaging.   [] COPD, entero/rhinovirus: Management per Pulmonary   [] Elevated D-dimer: Workup per primary team  [] Atrial fibrillation: Rate controlled, continue beta blocker. Recommend anticoagulation for stroke prophylaxis.     We will continue to follow along.    Kevin Hong MD  Cardiology

## 2023-10-16 NOTE — CONSULT NOTE ADULT - SUBJECTIVE AND OBJECTIVE BOX
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 associated with breif episode of difficulty breathing. Denies radiation of pain, denies palpitations, fever, chills, cough.  at this time when seen patient back to normal. unsure what made it better    Of note patient was recently hospitalized 9/18/23 - 9/25/23  for hypoxia due to Adenovirus and found to have large Left pleural effusion where he had refused thoracentesis in fear of lung collapse. Patient was told risk of possible cancer vs CHF as cause and need sampling of fluid.    In the ED, Pt's VS on arrival T 98.8F, /56, HR 73, SpO2 94% on RA. Pt was given nitroglycerin for the chest tightness/discomfort. Labs pertinent for +Entero/Rhinovirus, Hgb 11.4, .1, normal PT and INR, D-dimer 1162, Cr 1.5, Albumin 2.7, Trop x1 neg and Pro-BNP 2833.  EKG Afib rate 74/min with nonspecific ST and T wave abnormality. CXR showed large left pleural effusion present on prior CXR.       Bedside US of chest done noted again mod-large left effusino, no right effusion, no B lines  discussed with patient that will benefit from diagnostic thoracentesis, he states if found cancer i would not want treatment therefore there is no reason to test.  he further states i have no sob, cough, respiratory issues at this time I want to go home and want no interventions    PAST MEDICAL & SURGICAL HISTORY:  Afib  Congestive heart failure (CHF)  CVA (cerebral vascular accident)  Hypertension, unspecified type  Chronic obstructive pulmonary disease (COPD)  No significant past surgical history        Allergies  No Known Allergies      FAMILY HISTORY: Denies    Social History:  Pt lives at home with his children. Ambulates with a rolling walker. (16 Oct 2023 01:15)     Smoking:  States stop smoking cigars     Drinking:     Drug use:    Review of Systems: as stated above    CONSTITUTIONAL: No fever, No chills, +atigue  EYES: No eye pain, No visual disturbances, No discharge  ENMT:  No difficulty hearing, No tinnitus, No vertigo; No sinus or throat pain  NECK: No pain, No stiffness  RESPIRATORY: No Cough, No SOB, No Secretions  CARDIOVASCULAR: +hest pain, No palpitations, No dizziness, or ++g swelling  GASTROINTESTINAL: No abdominal or epigastric pain. No nausea, No vomiting, No hematemesis; No diarrhea, No constipation. No melena, No hematochezia.  GENITOURINARY: No dysuria, No frequency, No hematuria, No incontinence  NEUROLOGICAL: No headaches, No memory loss, No loss of strength, No numbness, No tremors  SKIN: No itching, No burning, No rashes, No lesions   MUSCULOSKELETAL: No joint pain or swelling; No muscle, back, No extremity pain  PSYCHIATRIC: No depression, No anxiety, No mood swings, No difficulty sleeping      Medications:  MEDICATIONS  (STANDING):  allopurinol 100 milliGRAM(s) Oral daily  aspirin enteric coated 81 milliGRAM(s) Oral daily  atorvastatin 40 milliGRAM(s) Oral at bedtime  budesonide 160 MICROgram(s)/formoterol 4.5 MICROgram(s) Inhaler 2 Puff(s) Inhalation two times a day  dapagliflozin 10 milliGRAM(s) Oral every 24 hours  furosemide   Injectable 40 milliGRAM(s) IV Push two times a day  hydrALAZINE 25 milliGRAM(s) Oral every 8 hours  metoprolol succinate ER 50 milliGRAM(s) Oral daily  multivitamin 1 Tablet(s) Oral daily  pantoprazole    Tablet 40 milliGRAM(s) Oral before breakfast  polyethylene glycol 3350 17 Gram(s) Oral daily  potassium chloride    Tablet ER 20 milliEquivalent(s) Oral once  senna 1 Tablet(s) Oral at bedtime  tiotropium 2.5 MICROgram(s) Inhaler 2 Puff(s) Inhalation daily    MEDICATIONS  (PRN):  acetaminophen     Tablet .. 650 milliGRAM(s) Oral every 6 hours PRN Temp greater or equal to 38C (100.4F), Mild Pain (1 - 3)  albuterol    90 MICROgram(s) HFA Inhaler 2 Puff(s) Inhalation every 6 hours PRN Shortness of Breath and/or Wheezing  aluminum hydroxide/magnesium hydroxide/simethicone Suspension 30 milliLiter(s) Oral every 4 hours PRN Dyspepsia  melatonin 3 milliGRAM(s) Oral at bedtime PRN Insomnia  ondansetron Injectable 4 milliGRAM(s) IV Push every 8 hours PRN Nausea and/or Vomiting      Antibiotics History      Heme Medications   aspirin enteric coated 81 milliGRAM(s) Oral daily, 10-16-23 @ 01:48      GI Medications  aluminum hydroxide/magnesium hydroxide/simethicone Suspension 30 milliLiter(s) Oral every 4 hours, 10-16-23 @ 02:11, Routine PRN  pantoprazole    Tablet 40 milliGRAM(s) Oral before breakfast, 10-16-23 @ 01:50, Routine  polyethylene glycol 3350 17 Gram(s) Oral daily, 10-16-23 @ 02:13, Routine  senna 1 Tablet(s) Oral at bedtime, 10-16-23 @ 02:12, Routine        Home Medications:  Last Order Reconciliation Date: 10-16-23 @ 01:52 (Admission Reconciliation)  albuterol 90 mcg/inh inhalation aerosol: 2 puff(s) inhaled every 6 hours (01-12-23 @ 13:36)  allopurinol 100 mg oral tablet: 1 tab(s) orally once a day (01-12-23 @ 13:36)  apixaban 2.5 mg oral tablet: 1 tab(s) orally every 12 hours (09-27-23 @ 15:26)  aspirin 81 mg oral delayed release tablet: 1 tab(s) orally once a day (01-03-23 @ 14:50)  atorvastatin 40 mg oral tablet: 1 tab(s) orally once a day (at bedtime) (09-27-23 @ 15:26)  budesonide-formoterol 160 mcg-4.5 mcg/inh inhalation aerosol: 2 puff(s) inhaled 2 times a day (01-12-23 @ 13:36)  dapagliflozin 10 mg oral tablet: 1 tab(s) orally every 24 hours (09-27-23 @ 15:26)  furosemide 40 mg oral tablet: 1 tab(s) orally 2 times a day (01-12-23 @ 13:36)  hydrALAZINE 25 mg oral tablet: 1 tab(s) orally every 8 hours (01-12-23 @ 13:36)  metoprolol succinate 50 mg oral tablet, extended release: 1 tab(s) orally once a day (01-12-23 @ 13:55)  Multiple Vitamins oral tablet: 1 tab(s) orally once a day (01-12-23 @ 13:31)  pantoprazole 40 mg oral delayed release tablet: 1 tab(s) orally once a day (before a meal) (01-12-23 @ 13:36)  tiotropium 2.5 mcg/inh inhalation aerosol: 2 puff(s) inhaled once a day (01-12-23 @ 13:36)      LABS:                        11.3   8.76  )-----------( 207      ( 16 Oct 2023 07:34 )             34.5     10-16    140  |  99  |  39<H>  ----------------------------<  101<H>  3.5   |  29  |  1.46<H>    Ca    8.4      16 Oct 2023 07:34  Mg     3.1     10-16    TPro  7.9  /  Alb  2.6<L>  /  TBili  0.7  /  DBili  x   /  AST  24  /  ALT  21  /  AlkPhos  69  10-16    HIT ab -- 10-15 @ 22:30  HIT ab EIA --  D Dimer -[id number]    PT/INR - ( 15 Oct 2023 22:30 )   PT: 12.5 sec;   INR: 1.07 ratio    PTT - ( 15 Oct 2023 22:30 )  PTT:32.4 sec    Trend Bun/Cr  10-16-23 @ 07:34  BUN/CR -  39<H> / 1.46<H>  10-15-23 @ 22:30  BUN/CR -  39<H> / 1.50<H>  09-25-23 @ 06:24  BUN/CR -  40<H> / 1.56<H>  09-24-23 @ 07:40  BUN/CR -  42<H> / 1.46<H>  09-23-23 @ 07:00  BUN/CR -  46<H> / 1.65<H>  09-22-23 @ 07:20  BUN/CR -  49<H> / 1.50<H>  09-21-23 @ 07:12  BUN/CR -  48<H> / 1.57<H>  09-20-23 @ 05:52  BUN/CR -  45<H> / 1.62<H>  09-19-23 @ 07:28  BUN/CR -  41<H> / 1.55<H>  09-18-23 @ 12:07  BUN/CR -  35<H> / 1.63<H>  01-12-23 @ 06:00  BUN/CR -  45<H> / 1.59<H>  01-10-23 @ 05:40  BUN/CR -  41<H> / 1.53<H>    Rapid RVP Result: Detected (10-15-23 @ 22:50) Entero/Rhinovirus (RapRVP): Detected (10.15.23 @ 22:50)   Trend Cardiac Enzymes  10-16-23 @ 07:34  FNS-EHBCM-CAMDU-CPKMM/Trop I - -- - --  - --  -  --  /  38.2  10-16-23 @ 02:00  CMW-DXEQZ-ZWFGM-CPKMM/Trop I - -- - --  - --  -  --  /  35.9  10-15-23 @ 22:30  CFS-GSXTI-VDXSG-CPKMM/Trop I - -- - --  - --  -  --  /  33.8      ABG - ( 16 Oct 2023 05:30 )  pH, Arterial: 7.46  pH, Blood: x     /  pCO2: 44    /  pO2: 72    / HCO3: 31    / Base Excess: 7.5   /  SaO2: 95.4     RADIOLOGY  CXR:  large left effusion suggestion of enlarged heart    CT:    < from: CT Chest No Cont (09.19.23 @ 09:27) >  COMPARISON: 12/27/2022.    FINDINGS:    AIRWAYS, LUNGS, PLEURA: Trachea and mainstem bronchi patent.   Small/moderate-sized left pleural effusion. Lingular and left basilar   parenchymal opacification.    MEDIASTINUM: Cardiomegaly. Trace pericardial effusion. Unchanged   calcifications near the aortic root. Dilated ascending thoracic aorta   measures 4.1 cm. Mediastinal lymph nodes decreased in size compared to   12/27/2022.    IMAGED ABDOMEN: Cholelithiasis. Partially imaged right renal lesion   measuring 6.2 cm with Hounsfield units of 25 (image 139, series 2).   Additional bilateral renal hypodense lesions may represent cysts.    SOFT TISSUES: Unremarkable.    BONES: Unremarkable.      IMPRESSION:.    Small/moderate size left pleural effusion.    Lingular and left basilar parenchymal opacification favored to represent   atelectasis.    Partially imaged 6.2 cm right renal lesion; this lesion is indeterminate,   but could represent a hemorrhagic/proteinaceous cyst.    --- End of Report ---      < end of copied text >  ECHO:    < from: TTE Echo Complete w/o Contrast w/ Doppler (09.18.23 @ 16:51) >      Summary:   1. Low normal global left ventricular systolic function.   2. Left ventricular ejection fraction, by visual estimation, is 50%.   3. Increased LV wall thickness.   4. Normal left ventricular internal cavity size.   5. Normal right ventricular size and function.   6. Mildly enlarged left atrium.   7. Mildly enlarged right atrium.   8. Mild thickening and calcification of the anterior and posterior   mitral valve leaflets.   9. Moderately decreased mitral leaflet mobility.  10. 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  11. Mild mitral valve regurgitation.  12. Mild tricuspid regurgitation.  13. The aortic valve is not well visualized, appears heavily calcified.  14.Sclerotic aortic valve with normal opening.  15. Mild aortic regurgitation.  16. Large pleural effusion in the left lateral region.  17. Trivial pericardial effusion.  18. There is a significant pericardial fat pad present.    [id number] Kevin Hong MD Electronically signed on   9/18/2023 at 5:50:28 PM      < end of copied text >    VITALS:  T(C): 36.8 (10-16-23 @ 05:30), Max: 37.1 (10-15-23 @ 21:52)  T(F): 98.2 (10-16-23 @ 05:30), Max: 98.8 (10-15-23 @ 21:52)  HR: 72 (10-16-23 @ 08:07) (60 - 80)  BP: 126/58 (10-16-23 @ 08:07) (126/58 - 176/92)  BP(mean): 77 (10-16-23 @ 08:07) (77 - 77)  ABP: --  ABP(mean): --  RR: 22 (10-16-23 @ 08:07) (18 - 22)  SpO2: 97% (10-16-23 @ 08:07) (94% - 98%)  CVP(mm Hg): --  CVP(cm H2O): --    Ins and Outs       Height (cm): 167.6 (10-15-23 @ 21:52)  Weight (kg): 72.6 (10-15-23 @ 21:52)  BMI (kg/m2): 25.8 (10-15-23 @ 21:52)        I&O's Detail      Physical Examination:  GENERAL:               Alert, Oriented, No acute distress.    HEENT:                    Pupils equal, reactive to light.  Symmetric. No JVD, Moist MM  PULM:                     Bilateral air entry, diminished to ausciltion on right, no significant sputum production, No Rales, No Rhonchi, No Wheezing  CVS:                         S1, S2,  +murmur  ABD:                        Soft, nondistended, nontender, normoactive bowel sounds,   EXT:                         ++edema, nontender, No Cyanosis or Clubbing   Vascular:                Warm Extremities,   SKIN:                       Warm and well perfused, no rashes noted.   NEURO:                  Alert, oriented, interactive, nonfocal, follows commands  PSYC:                      Calm, + Insight.

## 2023-10-16 NOTE — H&P ADULT - NSHPREVIEWOFSYSTEMS_GEN_ALL_CORE
REVIEW OF SYSTEMS:    CONSTITUTIONAL: +weakness, No fevers or chills  EYES/ENT: No visual changes;  No vertigo or throat pain   NECK: No pain or stiffness  RESPIRATORY: +SOB, No cough, wheezing, hemoptysis  CARDIOVASCULAR: No chest pain or palpitations  GASTROINTESTINAL: No abdominal or epigastric pain. No nausea, vomiting, or hematemesis; No diarrhea or constipation. No melena or hematochezia.  GENITOURINARY: No dysuria, frequency or hematuria  NEUROLOGICAL: No numbness or weakness  SKIN: No itching, rashes

## 2023-10-16 NOTE — DISCHARGE NOTE PROVIDER - HOSPITAL COURSE
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 Of note, Pt was last hospitalized 9/18/23 - 9/25/23  for acute hypoxic respiratory failure possibly due to Adenovirus and found to have large Left pleural effusion, which Pt had refused thoracentesis in fear of lung collapse. Pt also refers, chest tightness and orthopnea. Denies fever, chills, cough, abdominal pain, nausea, vomiting or urinary symptoms. Additional history was obtained from Pt's , Salena, who dropped off the Pt. She refers the Pt being more weak, sleepy and looking grayish these past few days along with the labored breathing. As per Salena, Pt's voice recently changed and the dysarthria isn't due to CVA. She states the Pt has bouts of feeling ok and then not well, which made her bring him in. CXR showed left sided effusion. trop negative, EKG showed Afib. Pt had dopplers and V/q scan during previous visit a month ago. Pt evaluated by pulm and cardio. Pt again refused thoracentesis. Hx on afib, not on AC. Pt initially reports he doesnt take it because it is expensive, later reports he has in it. Antonietta reports pt doesnt have Eliquis. Called patient's pharmacy. Reports no insurance on file, pt has not picked up eliquis. PT eval ordered. Pt requesting to leave the hospital. Discuss the risks of not being on AC. Requested patient to stay for PT eval and sort out anti-coagulation. Pt requesting to leave the hospital. Risks explained to patient for leaving AMA. Pt signed AMA.

## 2023-10-16 NOTE — DISCHARGE NOTE PROVIDER - CARE PROVIDER_API CALL
Dewayne Rubio  Cardiovascular Disease  [address] [address]  Phone: [phone]  Fax: [phone]  Follow Up Time:

## 2023-10-16 NOTE — DISCHARGE NOTE PROVIDER - NSDCMRMEDTOKEN_GEN_ALL_CORE_FT
albuterol 90 mcg/inh inhalation aerosol: 2 puff(s) inhaled every 6 hours  allopurinol 100 mg oral tablet: 1 tab(s) orally once a day  apixaban 2.5 mg oral tablet: 1 tab(s) orally every 12 hours  aspirin 81 mg oral delayed release tablet: 1 tab(s) orally once a day  atorvastatin 40 mg oral tablet: 1 tab(s) orally once a day (at bedtime)  budesonide-formoterol 160 mcg-4.5 mcg/inh inhalation aerosol: 2 puff(s) inhaled 2 times a day  dapagliflozin 10 mg oral tablet: 1 tab(s) orally every 24 hours  furosemide 40 mg oral tablet: 1 tab(s) orally 2 times a day  hydrALAZINE 25 mg oral tablet: 1 tab(s) orally every 8 hours  metoprolol succinate 50 mg oral tablet, extended release: 1 tab(s) orally once a day  Multiple Vitamins oral tablet: 1 tab(s) orally once a day  pantoprazole 40 mg oral delayed release tablet: 1 tab(s) orally once a day (before a meal)  tiotropium 2.5 mcg/inh inhalation aerosol: 2 puff(s) inhaled once a day

## 2023-10-16 NOTE — GOALS OF CARE CONVERSATION - ADVANCED CARE PLANNING - CONVERSATION DETAILS
Pt. here  with SOB. He has hx. CHF, CVA, afib, COPD. Pt. is A&Ox3 though speaks in a grabled tone and with [place] accent so difficult to understand at times. I asked patient about HCP designation, he confirmed daughter and .  I asked pt. about GOC. he stated he wanted to "give it a try" meaning attempt resuscitation and intubation in the event of cardiac arrest. Full Code.

## 2023-10-16 NOTE — CONSULT NOTE ADULT - SUBJECTIVE AND OBJECTIVE BOX
JAYLON SINGLETON  [id number]      HPI:    Jaylon Singleton is an 89 year old man with past medical history of Atrial fibrillation (on Eliquis), HFrEF (LVEF 25-30% in 2019), Hypertension, Chronic kidney disease, COPD and history of CVA with recent hospitalization here in September for congestive heart failure and adenovirus, and possible pulmonary embolism, now presents with shortness of breath and fatigue.       ALLERGIES:  No Known Allergies      PAST MEDICAL & SURGICAL HISTORY:  Afib  Congestive heart failure (CHF)  CVA (cerebral vascular accident)  Hypertension, unspecified type  Chronic obstructive pulmonary disease (COPD)  No significant past surgical history      CURRENT MEDICATIONS:  acetaminophen     Tablet .. 650 milliGRAM(s) Oral every 6 hours PRN  albuterol    90 MICROgram(s) HFA Inhaler 2 Puff(s) Inhalation every 6 hours PRN  allopurinol 100 milliGRAM(s) Oral daily  aluminum hydroxide/magnesium hydroxide/simethicone Suspension 30 milliLiter(s) Oral every 4 hours PRN  aspirin enteric coated 81 milliGRAM(s) Oral daily  atorvastatin 40 milliGRAM(s) Oral at bedtime  budesonide 160 MICROgram(s)/formoterol 4.5 MICROgram(s) Inhaler 2 Puff(s) Inhalation two times a day  dapagliflozin 10 milliGRAM(s) Oral every 24 hours  furosemide   Injectable 40 milliGRAM(s) IV Push two times a day  hydrALAZINE 25 milliGRAM(s) Oral every 8 hours  melatonin 3 milliGRAM(s) Oral at bedtime PRN  metoprolol succinate ER 50 milliGRAM(s) Oral daily  multivitamin 1 Tablet(s) Oral daily  ondansetron Injectable 4 milliGRAM(s) IV Push every 8 hours PRN  pantoprazole    Tablet 40 milliGRAM(s) Oral before breakfast  polyethylene glycol 3350 17 Gram(s) Oral daily  potassium chloride    Tablet ER 20 milliEquivalent(s) Oral once  senna 1 Tablet(s) Oral at bedtime  tiotropium 2.5 MICROgram(s) Inhaler 2 Puff(s) Inhalation daily    ROS:  All 10 systems reviewed and positives noted in HPI    OBJECTIVE:    VITAL SIGNS:  Vital Signs Last 24 Hrs  T(C): 36.8 (16 Oct 2023 05:30), Max: 37.1 (15 Oct 2023 21:52)  T(F): 98.2 (16 Oct 2023 05:30), Max: 98.8 (15 Oct 2023 21:52)  HR: 72 (16 Oct 2023 08:07) (60 - 80)  BP: 126/58 (16 Oct 2023 08:07) (126/58 - 176/92)  BP(mean): 77 (16 Oct 2023 08:07) (77 - 77)  RR: 22 (16 Oct 2023 08:07) (18 - 22)  SpO2: 97% (16 Oct 2023 08:07) (94% - 98%)    Parameters below as of 16 Oct 2023 08:07  Patient On (Oxygen Delivery Method): room air      Physical Exam:   General: no acute distress  HEENT: sclera anicteric  Neck: supple  CVS: JVP ~ 9 cm H20, irregularly irregular, s1, s2  Chest: decreased breath sounds   Extremities: no lower extremity edema b/l  Neuro: awake, alert & oriented  Psych: normal affect    LABS:                        11.3   8.76  )-----------( 207      ( 16 Oct 2023 07:34 )             34.5     10-16    140  |  99  |  39<H>  ----------------------------<  101<H>  3.5   |  29  |  1.46<H>    Ca    8.4      16 Oct 2023 07:34  Mg     3.1     10-16    TPro  7.9  /  Alb  2.6<L>  /  TBili  0.7  /  DBili  x   /  AST  24  /  ALT  21  /  AlkPhos  69  10-16        PT/INR - ( 15 Oct 2023 22:30 )   PT: 12.5 sec;   INR: 1.07 ratio         PTT - ( 15 Oct 2023 22:30 )  PTT:32.4 sec        TTE (12/17/22):  LVEF 45-50%, mild LVH, regional wall motion abnormalities  Moderate MR, Mild-moderate TR    TTE (9/18/23):  LVEF 50%, LVH  Normal RV size and function  Biatrial enlargement  Moderate mitral valve stenosis  Mild MR, Mild TR  Sclerotic aortic valve with normal opening, Mild AR   Large left pleural effusion  Trivial pericardial effusion.    ECG (10/15/23): atrial fibrillation   JAYLON SINGLETON  [id number]      HPI:    Jaylon Singleton is an 89 year old man with past medical history of Atrial fibrillation (on Eliquis), HFrEF (LVEF 25-30% in 2019), Hypertension, Chronic kidney disease, COPD and history of CVA with recent hospitalization here in September for congestive heart failure and adenovirus, and possible pulmonary embolism, now presents with chest discomfort and shortness of breath.     The patient reports that he had an episode of left sided chest discomfort and recurrent shortness of breath. Reports compliance with his medications. Reports that he recently followed up with his cardiologist.       ALLERGIES:  No Known Allergies      PAST MEDICAL & SURGICAL HISTORY:  Afib  Congestive heart failure (CHF)  CVA (cerebral vascular accident)  Hypertension, unspecified type  Chronic obstructive pulmonary disease (COPD)  No significant past surgical history      CURRENT MEDICATIONS:  acetaminophen     Tablet .. 650 milliGRAM(s) Oral every 6 hours PRN  albuterol    90 MICROgram(s) HFA Inhaler 2 Puff(s) Inhalation every 6 hours PRN  allopurinol 100 milliGRAM(s) Oral daily  aluminum hydroxide/magnesium hydroxide/simethicone Suspension 30 milliLiter(s) Oral every 4 hours PRN  aspirin enteric coated 81 milliGRAM(s) Oral daily  atorvastatin 40 milliGRAM(s) Oral at bedtime  budesonide 160 MICROgram(s)/formoterol 4.5 MICROgram(s) Inhaler 2 Puff(s) Inhalation two times a day  dapagliflozin 10 milliGRAM(s) Oral every 24 hours  furosemide   Injectable 40 milliGRAM(s) IV Push two times a day  hydrALAZINE 25 milliGRAM(s) Oral every 8 hours  melatonin 3 milliGRAM(s) Oral at bedtime PRN  metoprolol succinate ER 50 milliGRAM(s) Oral daily  multivitamin 1 Tablet(s) Oral daily  ondansetron Injectable 4 milliGRAM(s) IV Push every 8 hours PRN  pantoprazole    Tablet 40 milliGRAM(s) Oral before breakfast  polyethylene glycol 3350 17 Gram(s) Oral daily  potassium chloride    Tablet ER 20 milliEquivalent(s) Oral once  senna 1 Tablet(s) Oral at bedtime  tiotropium 2.5 MICROgram(s) Inhaler 2 Puff(s) Inhalation daily    ROS:  All 10 systems reviewed and positives noted in HPI    OBJECTIVE:    VITAL SIGNS:  Vital Signs Last 24 Hrs  T(C): 36.8 (16 Oct 2023 05:30), Max: 37.1 (15 Oct 2023 21:52)  T(F): 98.2 (16 Oct 2023 05:30), Max: 98.8 (15 Oct 2023 21:52)  HR: 72 (16 Oct 2023 08:07) (60 - 80)  BP: 126/58 (16 Oct 2023 08:07) (126/58 - 176/92)  BP(mean): 77 (16 Oct 2023 08:07) (77 - 77)  RR: 22 (16 Oct 2023 08:07) (18 - 22)  SpO2: 97% (16 Oct 2023 08:07) (94% - 98%)    Parameters below as of 16 Oct 2023 08:07  Patient On (Oxygen Delivery Method): room air      Physical Exam:   General: no acute distress  HEENT: sclera anicteric  Neck: supple  CVS: JVP ~ 9 cm H20, irregularly irregular, s1, s2  Chest: decreased breath sounds   Extremities: mild lower extremity edema b/l  Neuro: awake, alert & oriented  Psych: normal affect    LABS:                        11.3   8.76  )-----------( 207      ( 16 Oct 2023 07:34 )             34.5     10-16    140  |  99  |  39<H>  ----------------------------<  101<H>  3.5   |  29  |  1.46<H>    Ca    8.4      16 Oct 2023 07:34  Mg     3.1     10-16    TPro  7.9  /  Alb  2.6<L>  /  TBili  0.7  /  DBili  x   /  AST  24  /  ALT  21  /  AlkPhos  69  10-16        PT/INR - ( 15 Oct 2023 22:30 )   PT: 12.5 sec;   INR: 1.07 ratio         PTT - ( 15 Oct 2023 22:30 )  PTT:32.4 sec        TTE (12/17/22):  LVEF 45-50%, mild LVH, regional wall motion abnormalities  Moderate MR, Mild-moderate TR    TTE (9/18/23):  LVEF 50%, LVH  Normal RV size and function  Biatrial enlargement  Moderate mitral valve stenosis  Mild MR, Mild TR  Sclerotic aortic valve with normal opening, Mild AR   Large left pleural effusion  Trivial pericardial effusion.    ECG (10/15/23): atrial fibrillation

## 2023-10-16 NOTE — H&P ADULT - NSHPPHYSICALEXAM_GEN_ALL_CORE
Vital Signs Last 24 Hrs  T(C): 37.1 (15 Oct 2023 21:52), Max: 37.1 (15 Oct 2023 21:52)  T(F): 98.8 (15 Oct 2023 21:52), Max: 98.8 (15 Oct 2023 21:52)  HR: 73 (15 Oct 2023 23:01) (73 - 80)  BP: 131/84 (15 Oct 2023 23:01) (131/84 - 176/92)  BP(mean): --  RR: 20 (15 Oct 2023 23:01) (19 - 20)  SpO2: 94% (15 Oct 2023 23:01) (94% - 94%)    Parameters below as of 15 Oct 2023 23:01  Patient On (Oxygen Delivery Method): room air    Constitutional: Pt sitting up in a chair, awake and alert, mild respiratory distress when speaking  HEENT: EOMI, normal hearing, moist mucous membranes  Neck: Soft and supple  Respiratory: tachypnea, decreased breath sounds on Lt, mild rales on Rt,  Cardiovascular: S1S2+, irregularly irregular, no M/G/R  Gastrointestinal: BS+, soft, NT/ND, no guarding, no rebound  Extremities: B/L LE nonpitting edema, no calf pain  Vascular: 2+ peripheral pulses  Neurological: AAOx3, no focal deficits  Skin: No rashes Vital Signs Last 24 Hrs  T(C): 37.1 (15 Oct 2023 21:52), Max: 37.1 (15 Oct 2023 21:52)  T(F): 98.8 (15 Oct 2023 21:52), Max: 98.8 (15 Oct 2023 21:52)  HR: 73 (15 Oct 2023 23:01) (73 - 80)  BP: 131/84 (15 Oct 2023 23:01) (131/84 - 176/92)  BP(mean): --  RR: 20 (15 Oct 2023 23:01) (19 - 20)  SpO2: 94% (15 Oct 2023 23:01) (94% - 94%)    Parameters below as of 15 Oct 2023 23:01  Patient On (Oxygen Delivery Method): room air    Constitutional: Pt sitting up in a chair, awake and alert, mild respiratory distress when speaking  HEENT: EOMI, normal hearing, moist mucous membranes  Neck: Soft and supple  Respiratory: tachypnea, decreased breath sounds on Lt, mild rales on Rt,  Cardiovascular: S1S2+, irregularly irregular, no M/G/R  Gastrointestinal: BS+, soft, NT/ND, no guarding, no rebound  Extremities: B/L LE nonpitting edema, no calf pain  Vascular: 2+ peripheral pulses  Neurological: AAOx3, slurring words  Skin: No rashes

## 2023-10-16 NOTE — H&P ADULT - ATTENDING COMMENTS
I have personally seen and examined patient on the above date.  I discussed the case with Dr Kaufman and I agree with findings and plan as detailed per note above, which I have amended where appropriate.    Superseding the above  89M hx of  HTN, HLD, CAD/stent, CVA with residual dysarthria, combined CHF, asthma/COPD, afib on eliquis with mitral stenosis s/p recent D/C 9/25/23 after admission for acute on chronic CHF complicated with +RVP with adenovirus and large L pleural effusion (thoracocentesis deferred as pt refused and pt on AC) ECHO 9/18/2023 with EF 50% and mod mitral stenosis and discharged with addition of Farxiga pw SOB and CP. Pt related still persistent intermittent SOB at home especially with reclining and associated with some chest pressure. No new fevers, chills, cough. Denied any NVD, dysuria. +edema in legs but reports it is better compared to previous. States noncompliance with Eliquis. Unclear whether he is on Farxiga or not. Takes Lasix only qd. History difficult to obtained sec to significant dysarthria.   s/p SL nitro in ED.     In ED, afebrile P: 73 BP: 176/56 sat 94% on RA  PE;   NAD  CV:S1S2, irregular, no mgr  CL: decreased BS on L base and mild crackles on the R base.   Abd: protuberant, NT, +BS.  Ext: bl LE edema       Labs:  WBC: 9.22 hgb: 11.4 66% N d-dimer: 1162 CO2: 32 BUN/cr: 39/1.5 (stable) BNP: 2833 trop: 33.8 COVID/RVP----  CXR: wet read. elev R HD compared to prev sugg RLL atel and persistent L pleural effusion  EKG: personally rev. afib at 74bpm no acute ST changes.   AP:   #Chronic combined CHF  Persistent SOB and CP with +RVP this time with enterorhinovirus with persistent L pleural effusion.   trend trops, serial EKGs, cardiology consult to optimize meds  ECHO as noted from previous admission as above  +elevated d-dimer - can repeat VQ scan to be complete as pt is not taking Eliquis  Pulm consult to see if thoracocentesis an option as pt is considering it and now off AC.   cont diuretics, BB, asa, statin, hydralazine. restart farxiga although if pt is not taking as outpt may be futile.

## 2023-10-16 NOTE — H&P ADULT - ASSESSMENT
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 Of note, Pt was last hospitalized 9/18/23 - 9/25/23  for acute hypoxic respiratory failure possibly due to Adenovirus and found to have large Left pleural effusion, which Pt had refused thoracentesis in fear of lung collapse. Pt also refers, chest tightness and orthopnea. Denies fever, chills, cough, abdominal pain, nausea, vomiting or urinary symptoms. Additional history was obtained from Pt's , Salena, who dropped off the Pt. She refers the Pt being more weak, sleepy and looking grayish these past few days along with the labored breathing. As per Salena, Pt's voice recently changed and the dysarthria isn't due to CVA. She states the Pt has bouts of feeling ok and then not well, which made her bring him in.    In the ED, Pt's VS on arrival T 98.8F, /56, HR 73, SpO2 94% on RA. Pt was given nitroglycerin for the chest tightness/discomfort. Labs pertinent for +Entero/Rhinovirus, Hgb 11.4, .1, normal PT and INR, D-dimer 1162, Cr 1.5, Albumin 2.7, Trop x1 neg and Pro-BNP 2833.  EKG Afib rate 74/min with nonspecific ST and T wave abnormality. CXR showed large left pleural effusion present on prior CXR. Pt is admitted for SOB    #Acute respiratory failure likely 2/2 pleural effusion  -last CXR 9/22 showed large pleural effusion on Left base of lung with cardiomegaly  -repeat CXR 10/16 showed unchanged left pleural effusion  -positive Entero/Rhinovirus  -supplemental O2 as needed  -elevated BNP, Trops neg x1,   -continue to trend trops  -Consult: Cardiology, Pulmonology             88y/o M with PMH CVA, Chronic combined CHF (EF 45-50%), Chronic Afib(was on Eliquis but stopped), CKD3 and COPD (not on home oxygen) presents to ED for SOB, described as difficulty breathing and "breathing stops at times"  x  2 weeks. Of note, Pt was last hospitalized 9/18/23 - 9/25/23  for acute hypoxic respiratory failure possibly due to Adenovirus and found to have large Left pleural effusion, which Pt had refused thoracentesis in fear of lung collapse. Pt also refers, chest tightness and orthopnea. Denies fever, chills, cough, abdominal pain, nausea, vomiting or urinary symptoms. Additional history was obtained from Pt's , Salena, who dropped off the Pt. She refers the Pt being more weak, sleepy and looking grayish these past few days along with the labored breathing. As per Salena, Pt's voice recently changed and the dysarthria isn't due to CVA. She states the Pt has bouts of feeling ok and then not well, which made her bring him in.    In the ED, Pt's VS on arrival T 98.8F, /56, HR 73, SpO2 94% on RA. Pt was given nitroglycerin for the chest tightness/discomfort. Labs pertinent for +Entero/Rhinovirus, Hgb 11.4, .1, normal PT and INR, D-dimer 1162, Cr 1.5, Albumin 2.7, Trop x1 neg and Pro-BNP 2833.  EKG Afib rate 74/min with nonspecific ST and T wave abnormality. CXR showed large left pleural effusion present on prior CXR. Pt is admitted for SOB    #Acute respiratory failure likely 2/2 pleural effusion  -last CXR 9/22 showed large pleural effusion on Left base of lung with cardiomegaly  -repeat CXR 10/16 showed unchanged left pleural effusion  -Entero/Rhinovirus +  -supplemental O2 as needed  -elevated BNP, Trops neg x1,   -continue to trend trops  -F/u V/Q scan  -Consult: Cardiology, Pulmonology      #Chronic Afib  -EKG showed Afib rate 74/min with nonspecific ST and T wave abnormalities  -Not on AC, as per Pt, the medication is expensive and does nothing  -PT, INR, PTT at normal levels    #Acute on Chronic combined CHF  -last Echo 9/18/23 showed EF 50% with large left pleural effusion  -on farxiga, lasix,   -Consult Cardio      #Entero/Rhinovirus  -contact and droplet precautions  -supportive care    #h/o CVA  -on ASA, atorvastatin    #CKD  -baseline Cr approx 1.5  -on admission Cr 1.5  -Continue to monitor    #HTN  -on Toprol and hydralazine    #COPD  -not on home oxygen  -on spiriva, symbicort, and albuterol    #Prophylaxis  -DVT ppx: not on AC due to possible procedure  -GI ppx: on pantoprazole        Case was discussed with attending, Dr. Serrano

## 2023-10-16 NOTE — CONSULT NOTE ADULT - ASSESSMENT
Assessment  Chest pain   ERV +  Chronic  Diastolic CHF   Moderate Left Effusion -   Elevated DDimer -  is non specific , downtrending from previous admission  Afib on eliquis  Cigar smoker, at risk for copd.   CKD    Plan  Supportive care for ERV  CE normal unsure if cardiogenic in origin  Patient on Eliquis, with elevated ddimer - very non specefic finding - downtrending      check LE duplex, Echo      not candidate for CTA to r/o PE      poor candidate for VQ scan due to abnormal CXR      patient on empirc NOAC, unsure of VTE  Patient refusing diagnostic thoracentesis, aware of risk of Cancer    at this time will defer till patient agreeable, if agree hold a/c from day prior  Continue spiriva and albuterol prn  consider palliative care eval for GOC as does not want tx for cancer   d/w hospitalist

## 2023-10-16 NOTE — ED PROVIDER NOTE - PHYSICAL EXAMINATION
VITAL SIGNS: I have reviewed nursing notes and confirm.   GEN: Well-developed; well-nourished; in mild acute respiratory distress. Speaking full sentences.   SKIN: Warm, pink, no rash, no diaphoresis, no cyanosis, well perfused.   HEAD: Normocephalic; atraumatic. No scalp lacerations, no abrasions.  NECK: Supple; non tender.   EYES: Pupils 3mm equal, round, reactive to light and accomodation, conjunctiva and sclera clear. Extra-ocular movements intact bilaterally.  ENT: No nasal discharge; airway clear. Trachea is midline. Normal dentition.  CV: RRR. S1, S2 normal; no murmurs, gallops, or rubs. Capillary refill < 2 seconds throughout. Distal pulses intact 2+ throughout.  RESP: (+) LEFT sided decreased breath sounds, mild tachypnea; mild bibasilar rales, no rhonchi.  ABD: Normal bowel sounds, soft, non-distended, non-tender, no rebound, no guarding, no rigidity, no hepatosplenomegaly.  MSK: Normal range of motion and movement of all 4 extremities. No apparent joint or muscular pain throughout.    BACK: No thoracolumbar midline or paravertebral tenderness. No step-offs or obvious deformities.  NEURO: Alert & oriented x 3, (+) mild dysphagia secondary to prior stroke.

## 2023-10-16 NOTE — H&P ADULT - NSICDXPASTMEDICALHX_GEN_ALL_CORE_FT
PAST MEDICAL HISTORY:  Afib     Chronic obstructive pulmonary disease (COPD)     Congestive heart failure (CHF)     CVA (cerebral vascular accident)     Hypertension, unspecified type

## 2023-11-22 PROBLEM — J44.9 CHRONIC OBSTRUCTIVE PULMONARY DISEASE, UNSPECIFIED: Chronic | Status: ACTIVE | Noted: 2023-01-01

## 2023-11-22 NOTE — ED ADULT NURSE NOTE - NSFALLUNIVINTERV_ED_ALL_ED
Bed/Stretcher in lowest position, wheels locked, appropriate side rails in place/Call bell, personal items and telephone in reach/Instruct patient to call for assistance before getting out of bed/chair/stretcher/Non-slip footwear applied when patient is off stretcher/[place] to call system/Physically safe environment - no spills, clutter or unnecessary equipment/Purposeful proactive rounding/Room/bathroom lighting operational, light cord in reach

## 2023-11-22 NOTE — ED PROVIDER NOTE - OBJECTIVE STATEMENT
89-year-old male presents to the emergency department brought in by EMS from home for shortness of breath.  Patient has history of CVA, CHF, chronic A-fib no longer on Eliquis, CKD stage III and COPD not on home O2.  Patient states that his breathing and swelling is like this because of 30 years of exposure to black mold.  Patient unclear for how long he had been experiencing shortness of breath.  Denies pain.  No abdominal pain or chest pain.  No nausea or vomiting.

## 2023-11-22 NOTE — H&P ADULT - NSHPPHYSICALEXAM_GEN_ALL_CORE
Wt(kg): --Vital Signs Last 24 Hrs  T(C): 36.2 (22 Nov 2023 12:50), Max: 36.2 (22 Nov 2023 12:50)  T(F): 97.2 (22 Nov 2023 12:50), Max: 97.2 (22 Nov 2023 12:50)  HR: 65 (22 Nov 2023 12:50) (65 - 65)  BP: 141/69 (22 Nov 2023 12:50) (141/69 - 141/69)  BP(mean): --  RR: 19 (22 Nov 2023 12:50) (19 - 19)  SpO2: 100% (22 Nov 2023 12:50) (100% - 100%)    Parameters below as of 22 Nov 2023 12:50  Patient On (Oxygen Delivery Method): nasal cannula  O2 Flow (L/min): 4    PHYSICAL EXAM:  GENERAL: NAD, well-groomed, well-developed  NERVOUS SYSTEM:  Alert & Oriented X2-3, Good concentration; Motor Strength 5/5 B/L upper and lower extremities; DTRs 2+ intact and symmetric  HEAD:  Atraumatic, Normocephalic  EYES: EOMI, PERRLA, conjunctiva and sclera clear  ENMT: No tonsillar erythema, exudates, or enlargement; Moist mucous membranes, Good dentition, No lesions  NECK: Supple, No JVD, Normal thyroid  CHEST/LUNG: poor air entry on left side. no wheezing.   HEART: IRR, normal rate   ABDOMEN: Soft, Nontender, Nondistended; Bowel sounds present  EXTREMITIES:  +2 edema b/l LE, piting. no cyanosis   LYMPH: No lymphadenopathy noted  SKIN: No rashes or lesions

## 2023-11-22 NOTE — ED ADULT NURSE NOTE - CHIEF COMPLAINT QUOTE
Pt BIBA from home c/o increasing SOB with bilateral leg swelling, h/o CHF on Lasix, O2 sat 88% on room improved with 4LPM oxygen to 100%, was given dexamethasone 10 mg IV by EMS,

## 2023-11-22 NOTE — ED PROVIDER NOTE - CLINICAL SUMMARY MEDICAL DECISION MAKING FREE TEXT BOX
89-year-old male presents to the emergency department brought in by EMS from home for shortness of breath.  Patient has history of CVA, CHF, chronic A-fib no longer on Eliquis, CKD stage III and COPD not on home O2.  Patient states that his breathing and swelling is like this because of 30 years of exposure to black mold.  Patient unclear for how long he had been experiencing shortness of breath.  Denies pain.  No abdominal pain or chest pain.  No nausea or vomiting.  Exam as stated. Plan for diuresis. Pt severely overloaded with fluid. No family at bedside yet - may be on the way. Plan for admission. 89-year-old male presents to the emergency department brought in by EMS from home for shortness of breath.  Patient has history of CVA, CHF, chronic A-fib no longer on Eliquis, CKD stage III and COPD not on home O2.  Patient states that his breathing and swelling is like this because of 30 years of exposure to black mold.  Patient unclear for how long he had been experiencing shortness of breath.  Denies pain.  No abdominal pain or chest pain.  No nausea or vomiting.  Exam as stated. Plan for diuresis. Pt severely overloaded with fluid. No family at bedside yet - may be on the way. Plan for admission.    d/w hospitalist.

## 2023-11-22 NOTE — H&P ADULT - ASSESSMENT
89y year old Male with PMH of combined CHF (EF 45-50%), Chronic A-fib (eliquis), Chronic Kidney Disease 3, COPD  who presents to the ER complaining of shortness of breath.     Acute hypoxic respiratory failure 2/2 CHF exacerbation  Large left sided pleural effusion  - IV diuresis lasix 40mg ivp bid  - recent TTE reviewed   - Cardio consult in am   - Pulm to eval in am ?candidate for thoracentesis  - tele monitoring   - Strict I/Os, daily weights  - Cardiology consultation  - Monitor and replace electrolytes     Chronic A-fib  - non-compliant with AC  - will use Lovenox if thoracentesis is necessary  - Toprol for rate control     Hx of CVA  - ASA, statin    COPD   - not in acute exacerbation     Chronic Kidney Disease 3  - monitor renal function     DVT Prophylaxis:  - Lovenox 1mg/kg q12h    Patient aware and in agreement with plan of care   89y year old Male with PMH of combined CHF (EF 45-50%), Chronic A-fib (eliquis), Chronic Kidney Disease 3, COPD  who presents to the ER complaining of shortness of breath.     Acute hypoxic respiratory failure 2/2 CHF exacerbation  Large left sided pleural effusion  - IV diuresis lasix 40mg ivp bid  - recent TTE reviewed   - Cardio consult in am   - Pulm to eval in am ?candidate for thoracentesis  - tele monitoring   - Strict I/Os, daily weights  - Cardiology consultation  - Monitor and replace electrolytes     Chronic A-fib  - non-compliant with AC  - will use Lovenox if thoracentesis is necessary  - Toprol for rate control     Hx of CVA  - ASA, statin    COPD   - not in acute exacerbation     Chronic Kidney Disease 3  - monitor renal function     DVT Prophylaxis:  - Lovenox 1mg/kg q12h    Patient aware and in agreement with plan of care  Updated daughter Frida    IMPROVE VTE Individual Risk Assessment          RISK                                                          Points  [  ] Previous VTE                                                3  [  ] Thrombophilia                                             2  [  ] Lower limb paralysis                                   2        (unable to hold up >15 seconds)    [  ] Current Cancer                                             2         (within 6 months)  [  ] Immobilization > 24 hrs                              1  [  ] ICU/CCU stay > 24 hours                             1  [ X ] Age > 60                                                         1    IMPROVE VTE Score:         [     1    ] 89y year old Male with PMH of combined CHF (EF 45-50%), Chronic A-fib (eliquis), Chronic Kidney Disease 3, COPD  who presents to the ER complaining of shortness of breath.     Acute hypoxic respiratory failure 2/2 CHF exacerbation  Large left sided pleural effusion  - IV diuresis lasix 40mg ivp bid  - recent TTE reviewed   - Cardio consult in am   - Pulm to eval in am ?candidate for thoracentesis  - tele monitoring   - Strict I/Os, daily weights  - Cardiology consultation  - Monitor and replace electrolytes   - GDMT: Toprol daily. not on ACE/ARB/ARNI 2/2 renal function. faraxiga on hold while hypervolemic     Chronic A-fib  - non-compliant with AC  - will use Lovenox if thoracentesis is necessary  - Toprol for rate control     Hx of CVA  - ASA, statin    COPD   - not in acute exacerbation     Chronic Kidney Disease 3  - monitor renal function     DVT Prophylaxis:  - Lovenox 1mg/kg q12h    Patient aware and in agreement with plan of care  Updated daughter Frida    IMPROVE VTE Individual Risk Assessment          RISK                                                          Points  [  ] Previous VTE                                                3  [  ] Thrombophilia                                             2  [  ] Lower limb paralysis                                   2        (unable to hold up >15 seconds)    [  ] Current Cancer                                             2         (within 6 months)  [  ] Immobilization > 24 hrs                              1  [  ] ICU/CCU stay > 24 hours                             1  [ X ] Age > 60                                                         1    IMPROVE VTE Score:         [     1    ]

## 2023-11-22 NOTE — ED ADULT TRIAGE NOTE - SOURCE OF INFORMATION
Printed and faxed pre op to [facility], [phone], right fax confirmed at 3:10 pm today, 9/10/2021.  [facility]  2nd Floor  Primary Care     EMS

## 2023-11-22 NOTE — ED ADULT NURSE NOTE - OBJECTIVE STATEMENT
Pt a&ox3 BIB EMS from home c/o SOB and bilateral lower extremity swelling. Pt with h/o CHF on lasix. O2 sat 97% on RA in ED. Denies fever/chills, cp.

## 2023-11-22 NOTE — H&P ADULT - NSHPLABSRESULTS_GEN_ALL_CORE
LABS:                        10.3   8.89  )-----------( 170      ( 22 Nov 2023 13:35 )             33.7     11-22    143  |  105  |  49<H>  ----------------------------<  92  4.3   |  30  |  1.44<H>    Ca    8.6      22 Nov 2023 13:35    TPro  7.3  /  Alb  2.7<L>  /  TBili  0.6  /  DBili  x   /  AST  42<H>  /  ALT  14  /  AlkPhos  64  11-22    PT/INR - ( 22 Nov 2023 13:35 )   PT: 23.6 sec;   INR: 2.12 ratio      PTT - ( 22 Nov 2023 13:35 )  PTT:37.9 sec  Urinalysis Basic - ( 22 Nov 2023 13:35 )    Urinalysis Basic - ( 22 Nov 2023 13:35 )    Color: x / Appearance: x / SG: x / pH: x  Gluc: 92 mg/dL / Ketone: x  / Bili: x / Urobili: x   Blood: x / Protein: x / Nitrite: x   Leuk Esterase: x / RBC: x / WBC x   Sq Epi: x / Non Sq Epi: x / Bacteria: x    RADIOLOGY & ADDITIONAL TESTS:    Consultant(s) Notes Reviewed:  [x ] YES  [ ] NO  Care Discussed with Consultants/Other Providers [ x] YES  [ ] NO d/w Dr. Hernandez  Imaging Personally Reviewed:  [X] YES  [ ] NO  Chest X-Ray (personally reviewed by me): cardiomegaly. large left sided effusion up to mid-lung

## 2023-11-22 NOTE — ED PROVIDER NOTE - CONSTITUTIONAL, MLM
Well appearing, awake, alert, oriented to person, place, time/situation and in no apparent distress. Unable to speak complete sentences due to increased labored breathing. normal...

## 2023-11-22 NOTE — H&P ADULT - HISTORY OF PRESENT ILLNESS
RAD SINGLETON is a 89y year old Male with PMH of combined CHF (EF 45-50%), Chronic A-fib (eliquis), Chronic Kidney Disease 3, COPD  who presents to the ER complaining of shortness of breath.     Recent admission to [facility] for SOB 10/2023, diuresed and started on eliquis 2.5mg bid but patient signed out AMA    On ER arrival:T 97.2, /69, RR 19, HR 65, sat 100% on 4L nc  Given lasix 80mg IVP in ER RAD SINGLETON is a 89y year old Male with PMH of combined CHF (EF 45-50%), Chronic A-fib (eliquis), Chronic Kidney Disease 3, COPD  who presents to the ER complaining of shortness of breath.     Patient limited historian. States he has been compliant with his medications. Supplemental history obtained from Frida (daughter) who patient lives with. States patient still works, has had leg swelling for some time but more noticeable over the last week. This morning he noted worsening shortness of breath.   Patient denies pain, discomfort, nausea, vomiting.     Recent admission to [facility] for SOB 10/2023, diuresed and started on eliquis 2.5mg bid but patient signed out AMA    On ER arrival:T 97.2, /69, RR 19, HR 65, sat 100% on 4L nc  Given lasix 80mg IVP in ER

## 2023-11-23 NOTE — CONSULT NOTE ADULT - SUBJECTIVE AND OBJECTIVE BOX
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     Patient limited historian. States he has been compliant with his medications. Supplemental history obtained from Efraín (daughter) who patient lives with. States patient still works, has had leg swelling for some time but more noticeable over the last week. This morning he noted worsening shortness of breath.   Patient denies pain, discomfort, nausea, vomiting.     Recent admission to [facility] for SOB 10/2023, diuresed and started on eliquis 2.5mg bid but patient signed out AMA    On ER arrival:T 97.2, /69, RR 19, HR 65, sat 100% on 4L nc  Given lasix 80mg IVP in ER (22 Nov 2023 15:31)    Seen interviewed and examined. NO c/p, mild sob, mostly c/o leg swelling.      PMH:   Afib    Congestive heart failure (CHF)    CVA (cerebral vascular accident)    Hypertension, unspecified type    Chronic obstructive pulmonary disease (COPD)        PSH:   No significant past surgical history        FAMILY HISTORY:  FAMILY HISTORY:  No pertinent family history in first degree relatives    No pertinent family history in first degree relatives        SOCIAL HISTORY:  Smoking:  no  Alcohol:  Drugs:    ALLERGIES:  No Known Allergies      Home Medications:  aspirin 81 mg oral delayed release tablet: 1 tab(s) orally once a day (22 Nov 2023 12:55)  Multiple Vitamins oral tablet: 1 tab(s) orally once a day (12 Jan 2023 13:31)      MEDICATIONS:  acetaminophen     Tablet .. 650 milliGRAM(s) Oral every 6 hours PRN  albuterol    90 MICROgram(s) HFA Inhaler 2 Puff(s) Inhalation every 6 hours PRN  allopurinol 100 milliGRAM(s) Oral daily  aluminum hydroxide/magnesium hydroxide/simethicone Suspension 30 milliLiter(s) Oral every 4 hours PRN  aspirin enteric coated 81 milliGRAM(s) Oral daily  atorvastatin 40 milliGRAM(s) Oral at bedtime  budesonide 160 MICROgram(s)/formoterol 4.5 MICROgram(s) Inhaler 2 Puff(s) Inhalation two times a day  enoxaparin Injectable 70 milliGRAM(s) SubCutaneous every 12 hours  furosemide   Injectable 40 milliGRAM(s) IV Push two times a day  hydrALAZINE 25 milliGRAM(s) Oral every 8 hours  melatonin 3 milliGRAM(s) Oral at bedtime PRN  metoprolol succinate ER 50 milliGRAM(s) Oral daily  multivitamin 1 Tablet(s) Oral daily  ondansetron Injectable 4 milliGRAM(s) IV Push every 8 hours PRN  pantoprazole    Tablet 40 milliGRAM(s) Oral before breakfast  tiotropium 2.5 MICROgram(s) Inhaler 2 Puff(s) Inhalation daily      REVIEW OF SYSTEMS:   per h and p  PHYSICAL EXAM:  T(C): 36.4 (11-23-23 @ 04:46), Max: 36.6 (11-22-23 @ 17:35)  HR: 70 (11-23-23 @ 08:42) (65 - 76)  BP: 122/65 (11-23-23 @ 04:46) (122/65 - 161/84)  RR: 18 (11-23-23 @ 04:46) (18 - 19)  SpO2: 95% (11-23-23 @ 08:42) (95% - 100%)  Wt(kg): --    GENERAL: NAD, well-groomed, well-developed  HEAD:  Atraumatic, Normocephalic  EYES: EOMI, conjunctiva and sclera clear  ENT: Moist mucous membranes,  NECK moderate to severe elevated JVP  CHEST/LUNG: dullness no bx left base  HEART: No murmurs, rubs, or gallops PMI non displaced.  ABDOMEN: Soft, markedly distened, + ascites  EXTREMITIES:  3+ edema pitting bilateral  SKIN: No rashes or lesions  NERVOUS SYSTEM:  Alert \    Cardiovascular Diagnostic Testing:  ECG:  < from: 12 Lead ECG (11.22.23 @ 13:01) >    Ventricular Rate 67 BPM    Atrial Rate 96 BPM    QRS Duration 92 ms    Q-T Interval 396 ms    QTC Calculation(Bazett) 418 ms    R Axis 14 degrees    T Axis 224 degrees    Diagnosis Line Atrial fibrillation with premature ventricular or aberrantly conducted complexes  ST and T wave abnormality, consider inferolateral ischemia  Abnormal ECG  When compared with ECG of 15-OCT-2023 22:00,  T wave inversion now evident in Lateral leads  Confirmed by HEBERT REYES MD ([id number]) on 11/23/2023 9:29:36 AM    < end of copied text >      LABS:                        10.2   7.81  )-----------( 161      ( 23 Nov 2023 07:03 )             32.1     11-23    142  |  102  |  53<H>  ----------------------------<  124<H>  3.8   |  29  |  1.66<H>    Ca    8.6      23 Nov 2023 07:03  Mg     2.4     11-23    TPro  7.1  /  Alb  2.7<L>  /  TBili  0.5  /  DBili  x   /  AST  21  /  ALT  15  /  AlkPhos  63  11-23    PT/INR - ( 22 Nov 2023 13:35 )   PT: 23.6 sec;   INR: 2.12 ratio         PTT - ( 22 Nov 2023 13:35 )  PTT:37.9 sec              Troponin I, High Sensitivity Result: 47.0 ng/L (11-22-23 @ 13:35)      Telemetry: af 70-80    IMAGING:  < from: CT Chest No Cont (11.22.23 @ 14:44) >    ACC: [id number] EXAM:  CT CHEST   ORDERED BY: ARIELA BERMUDEZ     PROCEDURE DATE:  11/22/2023          INTERPRETATION:  CLINICAL INFORMATION: Large left pleural effusion    COMPARISON: CT scan of 9/19/2023 and x-ray of earlier in the day    CONTRAST/COMPLICATIONS:  IV Contrast: None  Oral Contrast: None  Complications: None reported    PROCEDURE:  CT of the Chest was performed.  Sagittal and coronal reformats were performed.    FINDINGS:    LUNGS AND AIRWAYS: Patent central airways.  Lungs are remarkable for   linear atelectasis at the left lung base and significant passive   atelectasis in the left lower lobe.  PLEURA: Large loculated left pleural effusion. This is similar in   appearance to the prior study.  MEDIASTINUM AND YANIQUE: Small mediastinal lymph nodes are grossly stable.  VESSELS: Atherosclerotic calcification of aorta and of the coronary   arteries.  HEART: Significant cardiomegaly No pericardial effusion.  CHEST WALL AND LOWER NECK: Within normal limits.  VISUALIZED UPPER ABDOMEN: Partially visualized right upper pole renal   cysts. Punctate hepatic calcifications  BONES: Degenerative changes of the spine right shoulder. Probable old   right first and second rib fractures with deformity are appreciated. DISH   is also noted in the thoracic spine    IMPRESSION:  Cardiomegaly with large left pleural effusion as seen on the chest x-ray    --- End of Report ---            EFRAÍN TORRES MD; Attending Radiologist  This document has been electronically signed. Nov 22 2023  3:13PM    < end of copied text >  < from: TTE Echo Limited or F/U (10.16.23 @ 12:43) >    ACC: [id number] EXAM:  ECHO TTE WO CON FU [facility] [id number]                          PROCEDURE DATE:  10/16/2023          INTERPRETATION:  TRANSTHORACIC ECHOCARDIOGRAM REPORT        Patient Name:   RAD SINGLETON Patient Location: [facility]  [facility] Rec #:  [id number]            Accession #:      [id number]  Account #:      [id number]             Height:           65.7 in 167.0 cm  YOB: 1934          Weight:           160.9 lb 73.00 kg  Patient Age:    89 years           BSA:              1.82 m²  Patient Gender: M                  BP:               0/0 mmHg      Date of Exam:        10/16/2023 12:43:55 PM  Sonographer:         STORMY  Referring Physician: JOSÉ MIGUEL    Procedure:     Follow up or Limited Echocardiogram.  Indications:   R06.00 - Dyspnea, unspecified  Diagnosis:     Pleural effusion  Study Details: Technically adequate study.    SPECTRAL DOPPLER ANALYSIS (where applicable):  Tricuspid Valve and PA/RV Systolic Pressure: TR Max Velocity:  RA   Pressure: 8 mmHg RVSP/PASP:      PHYSICIAN INTERPRETATION:  Left Ventricle:  Global LV systolic function was low normal. Left ventricular ejection   fraction, by visual estimation, is 50%.  Right Ventricle: Normal right ventricular size and function.  Left Atrium: Left atrial enlargement.  Right Atrium: Right atrial enlargement.  Pericardium: A small pericardial effusion is present. The pericardial   effusion is posterior to the left ventricle and localized near the left   ventricle. There is a large pleural effusion in the left lateral region.  Mitral Valve: Moderate thickening and calcification of the anterior and   posterior mitral valve leaflets. Mobility is moderately decreased for   both leaflets. Moderate mitral valve stenosis (mean gradient    7 mmHg at HR 77 BPM, MVA 1.1 cm^2).  Tricuspid Valve: The tricuspid valve is not well visualized. Mild   tricuspid regurgitation is visualized.  Venous: The inferior vena cava was dilated, with respiratory size   variation greater than 50%.      Summary:   1. Limited echocardiogram performed.   2. Low normal global left ventricular systolic function.   3. Left ventricular ejection fraction, by visual estimation, is 50%.   4. Normal right ventricular size and function.   5. Left atrial enlargement.   6. Right atrial enlargement.   7. Moderate thickening and calcification of the anterior and posterior   mitral valve leaflets.   8. Moderately decreased mitral leaflet mobility.   9. Moderate mitral valve stenosis (mean gradient    7 mmHg at HR 77 BPM, MVA 1.1 cm^2).  10. Mild tricuspid regurgitation.  11. Large pleural effusion in the left lateral region.  12. Small pericardial effusion.    [id number] Kevin Hong MD Electronically signed on   10/16/2023 at 1:47:28 PM        < end of copied text >

## 2023-11-23 NOTE — PROGRESS NOTE ADULT - ATTENDING COMMENTS
88 y/o M with a PMH of HFpEF, chronic Afib (not compliant with Eliquis), CKD stage 3, and COPD (smokes 3 cigars daily) who presented to the ED BIBA on 11/22/23 with worsening shortness of breath that he noticed the morning of admission and increased bilateral leg swelling over the past week. He is currently admitted with acute hypoxic respiratory failure secondary to acute on chronic HFpEF exacerbation and large L sided pleural effusion Plan: thoracentesis tomorrow as per pulm, apprec pulm and cardio recs and collaboration in care, iv diuresis, trend renal function, monitor i/os, daily weight, fluid and sodium restriction, trend probnp, complex care coordination

## 2023-11-23 NOTE — PROGRESS NOTE ADULT - PROBLEM SELECTOR PLAN 3
- GFR 39   - not on ACE/ARBS due to renal function   - F/u AM BMP - monitor BUN/CR while on Lasix Eucrisa Counseling: Patient may experience a mild burning sensation during topical application. Eucrisa is not approved in children less than 2 years of age.

## 2023-11-23 NOTE — DIETITIAN INITIAL EVALUATION ADULT - PERSON TAUGHT/METHOD
Heart-Healthy DASH/TLC/verbal instruction/teach back - (Patient repeats in own words)/patient instructed

## 2023-11-23 NOTE — DIETITIAN INITIAL EVALUATION ADULT - ORAL INTAKE PTA/DIET HISTORY
Unable to obtain full diet Hx from patient, no family at bedside. Will obtain subjective wt/diet history from pt/family PRN.

## 2023-11-23 NOTE — PROGRESS NOTE ADULT - PROBLEM SELECTOR PLAN 1
- EMS noted patient with SpO2 of 88% on room air   - Likely due to CHF exacerbation and pleural effusion as patient presenting with worsening SOB/increased leg swelling   - ECHO from 10/16/23: EF of 50% and left sided pleural effusion  - CXR from 11/22/23: moderate to large LEFT pleural effusion   - CT chest from 11/22/23: cardiomegaly with large left pleural effusion as seen on the chest x-ray  - pro-BNP of 3788 upon admission  - s/p IV Lasix 80 mg in the ED   - Will continue IV Lasix 40 mg BID   - Continue metoprolol 50 mg QD  - Supplemental oxygen   - Strict I&Os   - F/u cardio consult   - F/u pulmonary consult - possible thoracentesis? - EMS noted patient with SpO2 of 88% on room air   - Likely due to CHF exacerbation and pleural effusion as patient presenting with worsening SOB/increased leg swelling   - ECHO from 10/16/23: EF of 50% and left sided pleural effusion  - CXR from 11/22/23: moderate to large LEFT pleural effusion   - CT chest from 11/22/23: cardiomegaly with large left pleural effusion as seen on the chest x-ray  - pro-BNP of 3788 upon admission  - s/p IV Lasix 80 mg in the ED   - Will continue IV Lasix 40 mg BID   - Continue metoprolol 50 mg QD  - Supplemental oxygen   - Strict I&Os   - F/u cardio consult   - F/u pulmonary consult - possible thoracentesis tomorrow for large pleural effusion

## 2023-11-23 NOTE — DIETITIAN INITIAL EVALUATION ADULT - PERTINENT MEDS FT
MEDICATIONS  (STANDING):  allopurinol 100 milliGRAM(s) Oral daily  aspirin enteric coated 81 milliGRAM(s) Oral daily  atorvastatin 40 milliGRAM(s) Oral at bedtime  budesonide 160 MICROgram(s)/formoterol 4.5 MICROgram(s) Inhaler 2 Puff(s) Inhalation two times a day  enoxaparin Injectable 70 milliGRAM(s) SubCutaneous every 12 hours  furosemide   Injectable 40 milliGRAM(s) IV Push two times a day  hydrALAZINE 25 milliGRAM(s) Oral every 8 hours  metoprolol succinate ER 50 milliGRAM(s) Oral daily  multivitamin 1 Tablet(s) Oral daily  pantoprazole    Tablet 40 milliGRAM(s) Oral before breakfast  tiotropium 2.5 MICROgram(s) Inhaler 2 Puff(s) Inhalation daily    MEDICATIONS  (PRN):  acetaminophen     Tablet .. 650 milliGRAM(s) Oral every 6 hours PRN Temp greater or equal to 38C (100.4F), Mild Pain (1 - 3)  albuterol    90 MICROgram(s) HFA Inhaler 2 Puff(s) Inhalation every 6 hours PRN Shortness of Breath and/or Wheezing  aluminum hydroxide/magnesium hydroxide/simethicone Suspension 30 milliLiter(s) Oral every 4 hours PRN Dyspepsia  melatonin 3 milliGRAM(s) Oral at bedtime PRN Insomnia  ondansetron Injectable 4 milliGRAM(s) IV Push every 8 hours PRN Nausea and/or Vomiting

## 2023-11-23 NOTE — DIETITIAN INITIAL EVALUATION ADULT - OTHER INFO
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     Patient limited historian. States he has been compliant with his medications. Supplemental history obtained from Frida (daughter) who patient lives with. States patient still works, has had leg swelling for some time but more noticeable over the last week. This morning he noted worsening shortness of breath.   Patient denies pain, discomfort, nausea, vomiting.     Recent admission to [facility] for SOB 10/2023, diuresed and started on eliquis 2.5mg bid but patient signed out AMA    On ER arrival:T 97.2, /69, RR 19, HR 65, sat 100% on 4L nc  Given lasix 80mg IVP in ER    At this time patient tolerating diet w/ adequate appetite/intake consuming % of meal at this time per nursing flowsheets. +2 edema; bilateral foot, ankle, leg.   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     Patient limited historian. States he has been compliant with his medications. Supplemental history obtained from Frida (daughter) who patient lives with. States patient still works, has had leg swelling for some time but more noticeable over the last week. This morning he noted worsening shortness of breath.   Patient denies pain, discomfort, nausea, vomiting.     Recent admission to [facility] for SOB 10/2023, diuresed and started on eliquis 2.5mg bid but patient signed out AMA    On ER arrival:T 97.2, /69, RR 19, HR 65, sat 100% on 4L nc  Given lasix 80mg IVP in ER    At this time patient tolerating diet w/ adequate appetite/intake consuming % of meal at this time per nursing flowsheets. Observed breakfast meal with good acceptance. Patient reports the feeling of orzo getting stuck in his throat last night at dinner, team made aware, consider SLP to evaluate. Patient w/ CHF, +2 edema; bilateral foot, ankle, leg, receiving Lasix, addressed with Heart-Healthy DASH/TLC meal pattern, continue to monitor electrolytes. Provided education on Heart-Healthy DASH/TLC  meal pattern.   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     Patient limited historian. States he has been compliant with his medications. Supplemental history obtained from Frida (daughter) who patient lives with. States patient still works, has had leg swelling for some time but more noticeable over the last week. This morning he noted worsening shortness of breath.   Patient denies pain, discomfort, nausea, vomiting.     Recent admission to [facility] for SOB 10/2023, diuresed and started on eliquis 2.5mg bid but patient signed out AMA    On ER arrival:T 97.2, /69, RR 19, HR 65, sat 100% on 4L nc  Given lasix 80mg IVP in ER    At this time patient tolerating diet w/ adequate appetite/intake consuming % of meal per nursing flowsheets. Observed breakfast meal with good acceptance. Patient reports the feeling of orzo getting stuck in his throat last night at dinner, team made aware, consider SLP to evaluate. UBW per RD note 166.7 (9/19) CBW 163lb (11/22).  Patient w/ CHF, +2 edema; bilateral foot, ankle, leg, receiving Lasix, addressed with Heart-Healthy DASH/TLC meal pattern, continue to monitor electrolytes. Provided education on Heart-Healthy DASH/TLC  meal pattern.

## 2023-11-23 NOTE — CONSULT NOTE ADULT - SUBJECTIVE AND OBJECTIVE BOX
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     Patient limited historian. States he has been compliant with his medications. Supplemental history obtained from Frida (daughter) who patient lives with. States patient still works, has had leg swelling for some time but more noticeable over the last week. This morning he noted worsening shortness of breath.   Patient denies pain, discomfort, nausea, vomiting.     Recent admission to [facility] for SOB 10/2023, diuresed and started on eliquis 2.5mg bid but patient signed out AMA    On ER arrival:T 97.2, /69, RR 19, HR 65, sat 100% on 4L nc  Given lasix 80mg IVP in ER (22 Nov 2023 15:31)      BRIEF HOSPITAL COURSE: ***    PAST MEDICAL & SURGICAL HISTORY:  Afib  Congestive heart failure (CHF)  CVA (cerebral vascular accident)  Hypertension, unspecified type  Chronic obstructive pulmonary disease (COPD)  No significant past surgical history        Allergies  No Known Allergies      FAMILY HISTORY:    Social History:       Smoking:     Drinking:     Drug use:    Review of Systems: as stated above    CONSTITUTIONAL: No fever, No chills, No fatigue  EYES: No eye pain, No visual disturbances, No discharge  ENMT:  No difficulty hearing, No tinnitus, No vertigo; No sinus or throat pain  NECK: No pain, No stiffness  RESPIRATORY: No Cough, No SOB, No Secretions  CARDIOVASCULAR: No chest pain, No palpitations, No dizziness, or No leg swelling  GASTROINTESTINAL: No abdominal or epigastric pain. No nausea, No vomiting, No hematemesis; No diarrhea, No constipation. No melena, No hematochezia.  GENITOURINARY: No dysuria, No frequency, No hematuria, No incontinence  NEUROLOGICAL: No headaches, No memory loss, No loss of strength, No numbness, No tremors  SKIN: No itching, No burning, No rashes, No lesions   MUSCULOSKELETAL: No joint pain or swelling; No muscle, back, No extremity pain  PSYCHIATRIC: No depression, No anxiety, No mood swings, No difficulty sleeping      Medications:  MEDICATIONS  (STANDING):  allopurinol 100 milliGRAM(s) Oral daily  aspirin enteric coated 81 milliGRAM(s) Oral daily  atorvastatin 40 milliGRAM(s) Oral at bedtime  budesonide 160 MICROgram(s)/formoterol 4.5 MICROgram(s) Inhaler 2 Puff(s) Inhalation two times a day  enoxaparin Injectable 70 milliGRAM(s) SubCutaneous every 12 hours  furosemide   Injectable 40 milliGRAM(s) IV Push two times a day  hydrALAZINE 25 milliGRAM(s) Oral every 8 hours  metoprolol succinate ER 50 milliGRAM(s) Oral daily  multivitamin 1 Tablet(s) Oral daily  pantoprazole    Tablet 40 milliGRAM(s) Oral before breakfast  tiotropium 2.5 MICROgram(s) Inhaler 2 Puff(s) Inhalation daily    MEDICATIONS  (PRN):  acetaminophen     Tablet .. 650 milliGRAM(s) Oral every 6 hours PRN Temp greater or equal to 38C (100.4F), Mild Pain (1 - 3)  albuterol    90 MICROgram(s) HFA Inhaler 2 Puff(s) Inhalation every 6 hours PRN Shortness of Breath and/or Wheezing  aluminum hydroxide/magnesium hydroxide/simethicone Suspension 30 milliLiter(s) Oral every 4 hours PRN Dyspepsia  melatonin 3 milliGRAM(s) Oral at bedtime PRN Insomnia  ondansetron Injectable 4 milliGRAM(s) IV Push every 8 hours PRN Nausea and/or Vomiting         Heme Medications   aspirin enteric coated 81 milliGRAM(s) Oral daily, 11-22-23 @ 16:10  enoxaparin Injectable 70 milliGRAM(s) SubCutaneous every 12 hours, 11-22-23 @ 16:00      GI Medications  aluminum hydroxide/magnesium hydroxide/simethicone Suspension 30 milliLiter(s) Oral every 4 hours, 11-22-23 @ 15:59, Routine PRN  pantoprazole    Tablet 40 milliGRAM(s) Oral before breakfast, 11-22-23 @ 16:11, Routine        Home Medications:  Last Order Reconciliation Date: 11-22-23 @ 16:11 (Admission Reconciliation)  albuterol 90 mcg/inh inhalation aerosol: 2 puff(s) inhaled every 6 hours (11-22-23 @ 12:55)  allopurinol 100 mg oral tablet: 1 tab(s) orally once a day (11-22-23 @ 12:55)  apixaban 2.5 mg oral tablet: 1 tab(s) orally every 12 hours (11-22-23 @ 12:55)  aspirin 81 mg oral delayed release tablet: 1 tab(s) orally once a day (11-22-23 @ 12:55)  atorvastatin 40 mg oral tablet: 1 tab(s) orally once a day (at bedtime) (11-22-23 @ 12:55)  budesonide-formoterol 160 mcg-4.5 mcg/inh inhalation aerosol: 2 puff(s) inhaled 2 times a day (01-12-23 @ 13:36)  dapagliflozin 10 mg oral tablet: 1 tab(s) orally every 24 hours (09-27-23 @ 15:26)  furosemide 40 mg oral tablet: 1 tab(s) orally 2 times a day (01-12-23 @ 13:36)  hydrALAZINE 25 mg oral tablet: 1 tab(s) orally every 8 hours (01-12-23 @ 13:36)  metoprolol succinate 50 mg oral tablet, extended release: 1 tab(s) orally once a day (01-12-23 @ 13:55)  Multiple Vitamins oral tablet: 1 tab(s) orally once a day (01-12-23 @ 13:31)  pantoprazole 40 mg oral delayed release tablet: 1 tab(s) orally once a day (before a meal) (01-12-23 @ 13:36)  tiotropium 2.5 mcg/inh inhalation aerosol: 2 puff(s) inhaled once a day (01-12-23 @ 13:36)      LABS:                        10.2   7.81  )-----------( 161      ( 23 Nov 2023 07:03 )             32.1     11-23    142  |  102  |  53<H>  ----------------------------<  124<H>  3.8   |  29  |  1.66<H>    Ca    8.6      23 Nov 2023 07:03  Mg     2.4     11-23    TPro  7.1  /  Alb  2.7<L>  /  TBili  0.5  /  DBili  x   /  AST  21  /  ALT  15  /  AlkPhos  63  11-23         CULTURES: (if applicable)    Rapid RVP Result: NotDetec (11-22-23 @ 13:35)        CAPILLARY BLOOD GLUCOSE          RADIOLOGY  CXR:      CT:    ECHO:      VITALS:  T(C): 36.4 (11-23-23 @ 04:46), Max: 36.6 (11-22-23 @ 17:35)  T(F): 97.5 (11-23-23 @ 04:46), Max: 97.9 (11-22-23 @ 17:35)  HR: 70 (11-23-23 @ 08:42) (65 - 76)  BP: 122/65 (11-23-23 @ 04:46) (122/65 - 161/84)  BP(mean): --  ABP: --  ABP(mean): --  RR: 18 (11-23-23 @ 04:46) (18 - 19)  SpO2: 95% (11-23-23 @ 08:42) (95% - 100%)  CVP(mm Hg): --  CVP(cm H2O): --    Ins and Outs     11-23-23 @ 07:01  -  11-23-23 @ 11:11  --------------------------------------------------------  IN: 200 mL / OUT: 300 mL / NET: -100 mL        Height (cm): 167.6 (11-22-23 @ 17:35)  Weight (kg): 73.936 (11-22-23 @ 17:35)  BMI (kg/m2): 26.3 (11-22-23 @ 17:35)        I&O's Detail    23 Nov 2023 07:01  -  23 Nov 2023 11:11  --------------------------------------------------------  IN:    Oral Fluid: 200 mL  Total IN: 200 mL    OUT:    Voided (mL): 300 mL  Total OUT: 300 mL    Total NET: -100 mL          Physical Examination:  GENERAL:               Alert, Oriented, No acute distress.    HEENT:                    Pupils equal, reactive to light.  Symmetric. No JVD, Moist MM  PULM:                     Bilateral air entry, Clear to auscultation bilaterally, no significant sputum production, No Rales, No Rhonchi, No Wheezing  CVS:                         S1, S2,  No Murmur  ABD:                        Soft, nondistended, nontender, normoactive bowel sounds,   EXT:                         No edema, nontender, No Cyanosis or Clubbing   Vascular:                Warm Extremities, Normal Capillary refill, Normal Distal Pulses  SKIN:                       Warm and well perfused, no rashes noted.   NEURO:                  Alert, oriented, interactive, nonfocal, follows commands  PSYC:                      Calm, + Insight.     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 states there is black mold in his office and wore a jacket and since then has been having increasing sob. + leg swelling for some time but more noticeable over the last week denies n/v, cp, palp, n/v, cough secretions      Recent admission to [facility] for SOB 10/2023, diuresed and started on eliquis 2.5mg bid but patient signed out AMA  On ER arrival:T 97.2, /69, RR 19, HR 65, sat 100% on 4L nc  Given lasix 80mg IVP in ER     Since admission feeling better, less sob  on room air sat 94%  discussed pleural effusion and risks/benefits of draining today he agreed  discussed as on lovenox and got dose today unable to do procedure  will hold lovenox and do procedure in am.       PAST MEDICAL & SURGICAL HISTORY:  Afib  Congestive heart failure (CHF)  CVA (cerebral vascular accident)  Hypertension, unspecified type  Chronic obstructive pulmonary disease (COPD)  No significant past surgical history        Allergies  No Known Allergies      FAMILY HISTORY: denies    Social History:     Smoking: + Pipe     Drinking: occasional     Drug use: denies    Review of Systems: as stated above    CONSTITUTIONAL: No fever, No chills, +fatigue  EYES: No eye pain, No visual disturbances, No discharge  ENMT:  No difficulty hearing, No tinnitus, No vertigo; No sinus or throat pain  NECK: No pain, No stiffness  RESPIRATORY: No Cough, +SOB, No Secretions  CARDIOVASCULAR: No chest pain, No palpitations, No dizziness, or +leg swelling  GASTROINTESTINAL: No abdominal or epigastric pain. No nausea, No vomiting, No hematemesis; No diarrhea, No constipation. No melena, No hematochezia.  GENITOURINARY: No dysuria, No frequency, No hematuria, No incontinence  NEUROLOGICAL: No headaches, No memory loss, No loss of strength, No numbness, No tremors  SKIN: No itching, No burning, No rashes, No lesions   MUSCULOSKELETAL: No joint pain or swelling; No muscle, back, No extremity pain  PSYCHIATRIC: No depression, No anxiety, No mood swings, No difficulty sleeping      Medications:  MEDICATIONS  (STANDING):  allopurinol 100 milliGRAM(s) Oral daily  aspirin enteric coated 81 milliGRAM(s) Oral daily  atorvastatin 40 milliGRAM(s) Oral at bedtime  budesonide 160 MICROgram(s)/formoterol 4.5 MICROgram(s) Inhaler 2 Puff(s) Inhalation two times a day  enoxaparin Injectable 70 milliGRAM(s) SubCutaneous every 12 hours  furosemide   Injectable 40 milliGRAM(s) IV Push two times a day  hydrALAZINE 25 milliGRAM(s) Oral every 8 hours  metoprolol succinate ER 50 milliGRAM(s) Oral daily  multivitamin 1 Tablet(s) Oral daily  pantoprazole    Tablet 40 milliGRAM(s) Oral before breakfast  tiotropium 2.5 MICROgram(s) Inhaler 2 Puff(s) Inhalation daily    MEDICATIONS  (PRN):  acetaminophen     Tablet .. 650 milliGRAM(s) Oral every 6 hours PRN Temp greater or equal to 38C (100.4F), Mild Pain (1 - 3)  albuterol    90 MICROgram(s) HFA Inhaler 2 Puff(s) Inhalation every 6 hours PRN Shortness of Breath and/or Wheezing  aluminum hydroxide/magnesium hydroxide/simethicone Suspension 30 milliLiter(s) Oral every 4 hours PRN Dyspepsia  melatonin 3 milliGRAM(s) Oral at bedtime PRN Insomnia  ondansetron Injectable 4 milliGRAM(s) IV Push every 8 hours PRN Nausea and/or Vomiting         Heme Medications   aspirin enteric coated 81 milliGRAM(s) Oral daily, 11-22-23 @ 16:10  enoxaparin Injectable 70 milliGRAM(s) SubCutaneous every 12 hours, 11-22-23 @ 16:00      GI Medications  aluminum hydroxide/magnesium hydroxide/simethicone Suspension 30 milliLiter(s) Oral every 4 hours, 11-22-23 @ 15:59, Routine PRN  pantoprazole    Tablet 40 milliGRAM(s) Oral before breakfast, 11-22-23 @ 16:11, Routine        Home Medications:  Last Order Reconciliation Date: 11-22-23 @ 16:11 (Admission Reconciliation)  albuterol 90 mcg/inh inhalation aerosol: 2 puff(s) inhaled every 6 hours (11-22-23 @ 12:55)  allopurinol 100 mg oral tablet: 1 tab(s) orally once a day (11-22-23 @ 12:55)  apixaban 2.5 mg oral tablet: 1 tab(s) orally every 12 hours (11-22-23 @ 12:55)  aspirin 81 mg oral delayed release tablet: 1 tab(s) orally once a day (11-22-23 @ 12:55)  atorvastatin 40 mg oral tablet: 1 tab(s) orally once a day (at bedtime) (11-22-23 @ 12:55)  budesonide-formoterol 160 mcg-4.5 mcg/inh inhalation aerosol: 2 puff(s) inhaled 2 times a day (01-12-23 @ 13:36)  dapagliflozin 10 mg oral tablet: 1 tab(s) orally every 24 hours (09-27-23 @ 15:26)  furosemide 40 mg oral tablet: 1 tab(s) orally 2 times a day (01-12-23 @ 13:36)  hydrALAZINE 25 mg oral tablet: 1 tab(s) orally every 8 hours (01-12-23 @ 13:36)  metoprolol succinate 50 mg oral tablet, extended release: 1 tab(s) orally once a day (01-12-23 @ 13:55)  Multiple Vitamins oral tablet: 1 tab(s) orally once a day (01-12-23 @ 13:31)  pantoprazole 40 mg oral delayed release tablet: 1 tab(s) orally once a day (before a meal) (01-12-23 @ 13:36)  tiotropium 2.5 mcg/inh inhalation aerosol: 2 puff(s) inhaled once a day (01-12-23 @ 13:36)      LABS:                        10.2   7.81  )-----------( 161      ( 23 Nov 2023 07:03 )             32.1     11-23    142  |  102  |  53<H>  ----------------------------<  124<H>  3.8   |  29  |  1.66<H>    Ca    8.6      23 Nov 2023 07:03  Mg     2.4     11-23    TPro  7.1  /  Alb  2.7<L>  /  TBili  0.5  /  DBili  x   /  AST  21  /  ALT  15  /  AlkPhos  63  11-23       RADIOLOGY  CXR:  < from: Xray Chest 1 View- PORTABLE-Urgent (Xray Chest 1 View- PORTABLE-Urgent .) (10.15.23 @ 22:54) >  INTERPRETATION:  AP chest on October 15, 2023 at 10:37 PM. Concern is   pneumonia.    Heart is quite enlarged.    There is fluid and/or atelectasis concentrated in the lingular area.    Advanced bilateral shoulder degeneration again noted.    Chest is similar to September 22 of this year.    IMPRESSION: Stable findings as above.    --- End of Report ---      < end of copied text >      CT:    < from: CT Chest No Cont (11.22.23 @ 14:44) >  FINDINGS:    LUNGS AND AIRWAYS: Patent central airways.  Lungs are remarkable for   linear atelectasis at the left lung base and significant passive   atelectasis in the left lower lobe.  PLEURA: Large loculated left pleural effusion. This is similar in   appearance to the prior study.  MEDIASTINUM AND YANIQUE: Small mediastinal lymph nodes are grossly stable.  VESSELS: Atherosclerotic calcification of aorta and of the coronary   arteries.  HEART: Significant cardiomegaly No pericardial effusion.  CHEST WALL AND LOWER NECK: Within normal limits.  VISUALIZED UPPER ABDOMEN: Partially visualized right upper pole renal   cysts. Punctate hepatic calcifications  BONES: Degenerative changes of the spine right shoulder. Probable old   right first and second rib fractures with deformity are appreciated. DISH   is also noted in the thoracic spine    IMPRESSION:  Cardiomegaly with large left pleural effusion as seen on the chest x-ray    --- End of Report ---    < end of copied text >  ECHO:  < from: TTE Echo Limited or F/U (10.16.23 @ 12:43) >  Summary:   1. Limited echocardiogram performed.   2. Low normal global left ventricular systolic function.   3. Left ventricular ejection fraction, by visual estimation, is 50%.   4. Normal right ventricular size and function.   5. Left atrial enlargement.   6. Right atrial enlargement.   7. Moderate thickening and calcification of the anterior and posterior mitral valve leaflets.   8. Moderately decreased mitral leaflet mobility.   9. Moderate mitral valve stenosis (mean gradient    7 mmHg at HR 77 BPM, MVA 1.1 cm^2).  10. Mild tricuspid regurgitation.  11. Large pleural effusion in the left lateral region.  12. Small pericardial effusion.    [id number] Kevin Hong MD Electronically signed on   10/16/2023 at 1:47:28 PM      < end of copied text >      VITALS:  T(C): 36.4 (11-23-23 @ 04:46), Max: 36.6 (11-22-23 @ 17:35)  T(F): 97.5 (11-23-23 @ 04:46), Max: 97.9 (11-22-23 @ 17:35)  HR: 70 (11-23-23 @ 08:42) (65 - 76)  BP: 122/65 (11-23-23 @ 04:46) (122/65 - 161/84)  BP(mean): --  ABP: --  ABP(mean): --  RR: 18 (11-23-23 @ 04:46) (18 - 19)  SpO2: 95% (11-23-23 @ 08:42) (95% - 100%)  CVP(mm Hg): --  CVP(cm H2O): --    Ins and Outs     11-23-23 @ 07:01  -  11-23-23 @ 11:11  --------------------------------------------------------  IN: 200 mL / OUT: 300 mL / NET: -100 mL        Height (cm): 167.6 (11-22-23 @ 17:35)  Weight (kg): 73.936 (11-22-23 @ 17:35)  BMI (kg/m2): 26.3 (11-22-23 @ 17:35)        I&O's Detail    23 Nov 2023 07:01  -  23 Nov 2023 11:11  --------------------------------------------------------  IN:    Oral Fluid: 200 mL  Total IN: 200 mL    OUT:    Voided (mL): 300 mL  Total OUT: 300 mL    Total NET: -100 mL          Physical Examination:  GENERAL:               Alert, Oriented, No acute distress.    HEENT:                    No JVD, dry MM  PULM:                     Bilateral air entry, diminished to auscultation bilaterally, no significant sputum production, No Rales, No Rhonchi, No Wheezing  CVS:                         S1, S2,  +Murmur  ABD:                        Soft, nondistended, nontender, normoactive bowel sounds,   EXT:                         ++ edema, nontender, No Cyanosis or Clubbing   Vascular:                Warm Extremities,    NEURO:                  Alert, oriented, interactive, nonfocal, follows commands  PSYC:                      Calm, + Insight.

## 2023-11-23 NOTE — CONSULT NOTE ADULT - ASSESSMENT
HFpEF  AF rate controlled  ckd  CAD  moderate mitral stenosis  history of CVA      Suggest  needs vigorous diuresis, IV ( loop diuretic twice daily)  may add back Farxiga when not on IV diuretic  may add zaroxylyn if doesn't respond to loop diuretic  continue oral anticoagulant and b blocker for rate control especially in setting of mitral stenosis  statin, known cad
Assessment  Dyspnea suspect due to acute on chronic Diastolic CHF  Chronic Left pleural effusions     Afib on a/c  Cigar smoker, at risk for copd.   CKD    Plan  Case d/w patient and now agreeable to thoracentesis, will plan for tomorrow as had Lovenox today  hold Lovenox for possible procedure in am.   Appreciate cardio  input, diuresis   monitor on room air  out of bed as tolerated

## 2023-11-23 NOTE — DIETITIAN INITIAL EVALUATION ADULT - ADD RECOMMEND
1. Continue DASH/TLC diet.   2. Continue Multivitamin, to ensure 100% RDA met   3. Monitor PO intake, GI tolerance, skin integrity, labs, weight, and bowel movement regularity.   4. Honor food preferences as feasible. Assist with meals PRN and encourage PO intake.  5. Follow SLP recommendations  6. Provide ongoing diet education as needed  7. RD remains available upon request and will follow-up per protocol

## 2023-11-23 NOTE — PROGRESS NOTE ADULT - SUBJECTIVE AND OBJECTIVE BOX
S:  Patient is an 88 y/o M with a PMH of CHF, chronic Afib (not compliant with Eliquis), CKD stage 3, and COPD (smokes 3 cigars daily) who presented to the ED BIBA on 11/22/23 with worsening shortness of breath that he noticed the morning of admission and increased bilateral leg swelling over the past week. Was recently admitted in October 2023 for shortness of breath. Was found to have a pleural effusion. He was diuresed and patient left AMA.   He is currently admitted with acute hypoxic respiratory failure secondary to CHF exacerbation and large L sided pleural effusion.     Today is hospital day 1. Patient seen and examined sitting up in chair. 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 He is aware of why he is in the hospital. No significant events overnight.     O:  Vital Signs Last 24 Hrs  T(C): 36.4 (23 Nov 2023 04:46), Max: 36.6 (22 Nov 2023 17:35)  T(F): 97.5 (23 Nov 2023 04:46), Max: 97.9 (22 Nov 2023 17:35)  HR: 70 (23 Nov 2023 08:42) (65 - 76)  BP: 122/65 (23 Nov 2023 04:46) (122/65 - 161/84)  RR: 18 (23 Nov 2023 04:46) (18 - 19)  SpO2: 95% (23 Nov 2023 08:42) (95% - 100%)    Parameters below as of 23 Nov 2023 08:42  Patient On (Oxygen Delivery Method): room air    Physical Exam:  GENERAL: NAD, sitting up in chair   NERVOUS SYSTEM:  Alert & Oriented x 3  HEAD:  Atraumatic, Normocephalic  EYES: EOMI, conjunctiva clear and sclera white   NECK: Supple   CHEST/LUNG: poor air entry L lower lobe  HEART: irregular rate and rhythm   ABDOMEN: Soft, Nontender, Nondistended   EXTREMITIES:  2+ bilateral pitting edema     LABS:                        10.2   7.81  )-----------( 161      ( 23 Nov 2023 07:03 )             32.1     11-23    142  |  102  |  53<H>  ----------------------------<  124<H>  3.8   |  29  |  1.66<H>    Ca    8.6      23 Nov 2023 07:03  Mg     2.4     11-23    TPro  7.1  /  Alb  2.7<L>  /  TBili  0.5  /  DBili  x   /  AST  21  /  ALT  15  /  AlkPhos  63  11-23  PT/INR - ( 22 Nov 2023 13:35 )   PT: 23.6 sec;   INR: 2.12 ratio  ]  PTT - ( 22 Nov 2023 13:35 )  PTT:37.9 sec    IMAGING:  TTE Echo Limited or F/U (10.16.23 @ 12:43)   Summary:   1. Limited echocardiogram performed.   2. Low normal global left ventricular systolic function.   3. Left ventricular ejection fraction, by visual estimation, is 50%.   4. Normal right ventricular size and function.   5. Left atrial enlargement.   6. Right atrial enlargement.   7. Moderate thickening and calcification of the anterior and posterior   mitral valve leaflets.   8. Moderately decreased mitral leaflet mobility.   9. Moderate mitral valve stenosis (mean gradient    7 mmHg at HR 77 BPM, MVA 1.1 cm^2).  10. Mild tricuspid regurgitation.  11. Large pleural effusion in the left lateral region.  12. Small pericardial effusion.    Xray Chest 1 View AP/PA (11.22.23 @ 14:15)  IMPRESSION: Moderate to large left pleural effusion    CT Chest No Cont (11.22.23 @ 14:44)   IMPRESSION:  Cardiomegaly with large left pleural effusion as seen on the chest x-ray      MEDICATIONS  (STANDING):  allopurinol 100 milliGRAM(s) Oral daily  aspirin enteric coated 81 milliGRAM(s) Oral daily  atorvastatin 40 milliGRAM(s) Oral at bedtime  budesonide 160 MICROgram(s)/formoterol 4.5 MICROgram(s) Inhaler 2 Puff(s) Inhalation two times a day  enoxaparin Injectable 70 milliGRAM(s) SubCutaneous every 12 hours  furosemide   Injectable 40 milliGRAM(s) IV Push two times a day  hydrALAZINE 25 milliGRAM(s) Oral every 8 hours  metoprolol succinate ER 50 milliGRAM(s) Oral daily  multivitamin 1 Tablet(s) Oral daily  pantoprazole    Tablet 40 milliGRAM(s) Oral before breakfast  tiotropium 2.5 MICROgram(s) Inhaler 2 Puff(s) Inhalation daily    MEDICATIONS  (PRN):   acetaminophen     Tablet .. 650 milliGRAM(s) Oral every 6 hours PRN Temp greater or equal to 38C (100.4F), Mild Pain (1 - 3)  albuterol    90 MICROgram(s) HFA Inhaler 2 Puff(s) Inhalation every 6 hours PRN Shortness of Breath and/or Wheezing  aluminum hydroxide/magnesium hydroxide/simethicone Suspension 30 milliLiter(s) Oral every 4 hours PRN Dyspepsia  melatonin 3 milliGRAM(s) Oral at bedtime PRN Insomnia  ondansetron Injectable 4 milliGRAM(s) IV Push every 8 hours PRN Nausea and/or Vomiting

## 2023-11-23 NOTE — PROGRESS NOTE ADULT - ASSESSMENT
Patient is an 89 y/o M with a PMH of CHF, chronic Afib (not compliant with Eliquis), CKD stage 3, and COPD (smokes 3 cigars daily) who presented to the ED BIBA on 11/22/23 with worsening shortness of breath that he noticed the morning of admission and increased bilateral leg swelling over the past week. He is currently admitted with acute hypoxic respiratory failure secondary to CHF exacerbation and large L sided pleural effusion.    Patient is an 88 y/o M with a PMH of HFpEF, chronic Afib (not compliant with Eliquis), CKD stage 3, and COPD (smokes 3 cigars daily) who presented to the ED BIBA on 11/22/23 with worsening shortness of breath that he noticed the morning of admission and increased bilateral leg swelling over the past week. He is currently admitted with acute hypoxic respiratory failure secondary to acute on chronic HFpEF exacerbation and large L sided pleural effusion.

## 2023-11-23 NOTE — PROGRESS NOTE ADULT - PROBLEM SELECTOR PLAN 2
- Continue metoprolol 50 mg QD   - HX of CVA - should be on anticoagulation   - Given age and renal function was prescribed Eliquis 2.5 mg BID - patient is not compliant - chronic  - Continue metoprolol 50 mg QD   - HX of CVA - should be on anticoagulation   - Given age and renal function was prescribed Eliquis 2.5 mg BID - patient is not compliant

## 2023-11-23 NOTE — PROGRESS NOTE ADULT - TIME BILLING
care coordination, plan of care discussed with patient face to face, holiday IDR team, Dr Buckley cardio

## 2023-11-23 NOTE — DIETITIAN INITIAL EVALUATION ADULT - PERTINENT LABORATORY DATA
11-23    142  |  102  |  53<H>  ----------------------------<  124<H>  3.8   |  29  |  1.66<H>    Ca    8.6      23 Nov 2023 07:03  Mg     2.4     11-23    TPro  7.1  /  Alb  2.7<L>  /  TBili  0.5  /  DBili  x   /  AST  21  /  ALT  15  /  AlkPhos  63  11-23  A1C with Estimated Average Glucose Result: 5.8 % (09-20-23 @ 09:53)  A1C with Estimated Average Glucose Result: 5.8 % (09-19-23 @ 07:28)

## 2023-11-24 NOTE — PROGRESS NOTE ADULT - PROBLEM SELECTOR PLAN 6
- Continue metoprolol 50 mg QD   - Continue hydralazine 25 mg TID - Chronic, stable  - C/w metoprolol 50 mg QD   - C/w hydralazine 25 mg TID

## 2023-11-24 NOTE — PROGRESS NOTE ADULT - PROBLEM SELECTOR PLAN 7
DVT ppx:  Lovenox 70 mg SubQ q12 DVT ppx:  held Lovenox 70 mg SubQ q12, due to bloody effusion on thoracentesis  scds b/l DVT ppx:  held Lovenox 70 mg SubQ q12, due to bloody effusion on thoracentesis, SCDs pending b/l LE dopplers

## 2023-11-24 NOTE — PROGRESS NOTE ADULT - ASSESSMENT
Patient is an 89 y/o M with a PMH of HFpEF, chronic Afib (not compliant with Eliquis), CKD stage 3, and COPD (smokes 3 cigars daily) who presented to the ED BIBA on 11/22/23 with worsening shortness of breath that he noticed the morning of admission and increased bilateral leg swelling over the past week. He is currently admitted with acute hypoxic respiratory failure secondary to acute on chronic HFpEF exacerbation and large L sided pleural effusion.     Discussed with attending, Dr. Licea   Patient is an 88 y/o M with a PMH of HFpEF, chronic Afib (not compliant with Eliquis), CKD stage 3, and COPD (smokes 3 cigars daily) who presented to the ED BIBA on 11/22/23 with worsening shortness of breath that he noticed the morning of admission and increased bilateral leg swelling over the past week. He is currently admitted with acute hypoxic respiratory failure secondary to acute on chronic HFpEF exacerbation and large L sided pleural effusion. Plan for thoracentesis today and continued diuresis.    Discussed with attending, Dr. Licea

## 2023-11-24 NOTE — PROGRESS NOTE ADULT - SUBJECTIVE AND OBJECTIVE BOX
Follow-up Pulmonary Progress Note  Chief Complaint : Heart failure    pt seen and examined pre procedure  states feels well  no cp, sob, palp,   le edema continues  risks/benefits of thoracentesis given and concent obtained for trainage.        Allergies :No Known Allergies      PAST MEDICAL & SURGICAL HISTORY:  Afib    Congestive heart failure (CHF)    CVA (cerebral vascular accident)    Hypertension, unspecified type    Chronic obstructive pulmonary disease (COPD)    No significant past surgical history        Medications:  MEDICATIONS  (STANDING):  allopurinol 100 milliGRAM(s) Oral daily  aspirin enteric coated 81 milliGRAM(s) Oral daily  atorvastatin 40 milliGRAM(s) Oral at bedtime  budesonide 160 MICROgram(s)/formoterol 4.5 MICROgram(s) Inhaler 2 Puff(s) Inhalation two times a day  furosemide   Injectable 40 milliGRAM(s) IV Push two times a day  hydrALAZINE 25 milliGRAM(s) Oral every 8 hours  metoprolol succinate ER 50 milliGRAM(s) Oral daily  multivitamin 1 Tablet(s) Oral daily  pantoprazole    Tablet 40 milliGRAM(s) Oral before breakfast  senna 2 Tablet(s) Oral at bedtime  tiotropium 2.5 MICROgram(s) Inhaler 2 Puff(s) Inhalation daily    MEDICATIONS  (PRN):  acetaminophen     Tablet .. 650 milliGRAM(s) Oral every 6 hours PRN Temp greater or equal to 38C (100.4F), Mild Pain (1 - 3)  albuterol    90 MICROgram(s) HFA Inhaler 2 Puff(s) Inhalation every 6 hours PRN Shortness of Breath and/or Wheezing  aluminum hydroxide/magnesium hydroxide/simethicone Suspension 30 milliLiter(s) Oral every 4 hours PRN Dyspepsia  melatonin 3 milliGRAM(s) Oral at bedtime PRN Insomnia  ondansetron Injectable 4 milliGRAM(s) IV Push every 8 hours PRN Nausea and/or Vomiting      Antibiotics History      Heme Medications   aspirin enteric coated 81 milliGRAM(s) Oral daily, 11-22-23 @ 16:10      GI Medications  aluminum hydroxide/magnesium hydroxide/simethicone Suspension 30 milliLiter(s) Oral every 4 hours, 11-22-23 @ 15:59, Routine PRN  pantoprazole    Tablet 40 milliGRAM(s) Oral before breakfast, 11-22-23 @ 16:11, Routine  senna 2 Tablet(s) Oral at bedtime, 11-23-23 @ 14:03, Routine        LABS:                        10.7   10.09 )-----------( 170      ( 24 Nov 2023 06:55 )             33.7     11-24    141  |  103  |  56<H>  ----------------------------<  96  4.0   |  28  |  1.67<H>    Ca    8.7      24 Nov 2023 06:55  Phos  4.0     11-24  Mg     2.4     11-24    TPro  7.1  /  Alb  2.7<L>  /  TBili  0.5  /  DBili  x   /  AST  21  /  ALT  15  /  AlkPhos  63  11-23           VITALS:  T(C): 36.6 (11-24-23 @ 12:08), Max: 36.6 (11-24-23 @ 08:08)  T(F): 97.8 (11-24-23 @ 12:08), Max: 97.8 (11-24-23 @ 08:08)  HR: 68 (11-24-23 @ 12:08) (56 - 81)  BP: 139/64 (11-24-23 @ 12:08) (120/58 - 148/75)  BP(mean): --  ABP: --  ABP(mean): --  RR: 18 (11-24-23 @ 12:08) (18 - 18)  SpO2: 96% (11-24-23 @ 12:08) (92% - 100%)  CVP(mm Hg): --  CVP(cm H2O): --    Ins and Outs     11-23-23 @ 07:01  -  11-24-23 @ 07:00  --------------------------------------------------------  IN: 200 mL / OUT: 900 mL / NET: -700 mL        Height (cm): 167.6 (11-22-23 @ 17:35)  Weight (kg): 73.936 (11-22-23 @ 17:35)  BMI (kg/m2): 26.3 (11-22-23 @ 17:35)        I&O's Detail    23 Nov 2023 07:01  -  24 Nov 2023 07:00  --------------------------------------------------------  IN:    Oral Fluid: 200 mL  Total IN: 200 mL    OUT:    Voided (mL): 900 mL  Total OUT: 900 mL    Total NET: -700 mL

## 2023-11-24 NOTE — PROGRESS NOTE ADULT - SUBJECTIVE AND OBJECTIVE BOX
Patient is an 89 year-old male with a PMH of CHF, chronic Afib (not compliant with Eliquis), CKD stage 3, and COPD (smokes 3 cigars daily) who presented to the ED BIBA on 11/22/23 with worsening shortness of breath that he noticed the morning of admission and increased bilateral leg swelling over the past week. Was recently admitted in October 2023 for shortness of breath. Was found to have a pleural effusion. He was diuresed and patient left AMA.   He is currently admitted with acute hypoxic respiratory failure secondary to CHF exacerbation and large L sided pleural effusion.     Overnight Events: None  Interval HPI: Patient seen and examined at bedside.    Vital Signs Last 24 Hrs  T(C): 36.4 (24 Nov 2023 05:23), Max: 36.5 (23 Nov 2023 14:02)  T(F): 97.6 (24 Nov 2023 05:23), Max: 97.7 (23 Nov 2023 14:02)  HR: 66 (24 Nov 2023 05:23) (64 - 81)  BP: 148/75 (24 Nov 2023 05:23) (120/58 - 148/75)  BP(mean): --  RR: 18 (24 Nov 2023 05:23) (18 - 18)  SpO2: 95% (24 Nov 2023 05:23) (92% - 100%)    Parameters below as of 24 Nov 2023 05:23  Patient On (Oxygen Delivery Method): room air    Physical Exam:  GENERAL: NAD, sitting up in chair   NERVOUS SYSTEM:  Alert & Oriented x 3  HEAD:  Atraumatic, Normocephalic  EYES: EOMI, conjunctiva clear and sclera white   NECK: Supple   CHEST/LUNG: poor air entry L lower lobe  HEART: irregular rate and rhythm   ABDOMEN: Soft, Nontender, Nondistended   EXTREMITIES:  2+ bilateral pitting edema     LABS:  Personally reviewed.                    10.7   10.09 )-----------( 170      ( 24 Nov 2023 06:55 )             33.7     23 Nov 2023 07:03    142    |  102    |  53     ----------------------------<  124    3.8     |  29     |  1.66     Ca    8.6        23 Nov 2023 07:03  Mg     2.4       23 Nov 2023 07:03    TPro  7.1    /  Alb  2.7    /  TBili  0.5    /  DBili  x      /  AST  21     /  ALT  15     /  AlkPhos  63     23 Nov 2023 07:03    PT/INR - ( 22 Nov 2023 13:35 )   PT: 23.6 sec;   INR: 2.12 ratio         PTT - ( 22 Nov 2023 13:35 )  PTT:37.9 sec  CAPILLARY BLOOD GLUCOSE        LIVER FUNCTIONS - ( 23 Nov 2023 07:03 )  Alb: 2.7 g/dL / Pro: 7.1 g/dL / ALK PHOS: 63 U/L / ALT: 15 U/L / AST: 21 U/L / GGT: x           Urinalysis Basic - ( 23 Nov 2023 07:03 )    Color: x / Appearance: x / SG: x / pH: x  Gluc: 124 mg/dL / Ketone: x  / Bili: x / Urobili: x   Blood: x / Protein: x / Nitrite: x   Leuk Esterase: x / RBC: x / WBC x   Sq Epi: x / Non Sq Epi: x / Bacteria: x             IMAGING:  All imaging personally reviewed.    TTE Echo Limited or F/U (10.16.23 @ 12:43)   Summary:   1. Limited echocardiogram performed.   2. Low normal global left ventricular systolic function.   3. Left ventricular ejection fraction, by visual estimation, is 50%.   4. Normal right ventricular size and function.   5. Left atrial enlargement.   6. Right atrial enlargement.   7. Moderate thickening and calcification of the anterior and posterior   mitral valve leaflets.   8. Moderately decreased mitral leaflet mobility.   9. Moderate mitral valve stenosis (mean gradient    7 mmHg at HR 77 BPM, MVA 1.1 cm^2).  10. Mild tricuspid regurgitation.  11. Large pleural effusion in the left lateral region.  12. Small pericardial effusion.    Xray Chest 1 View AP/PA (11.22.23 @ 14:15)  IMPRESSION: Moderate to large left pleural effusion    CT Chest No Cont (11.22.23 @ 14:44)   IMPRESSION:  Cardiomegaly with large left pleural effusion as seen on the chest x-ray    MEDS:  MEDICATIONS  (STANDING):  allopurinol 100 milliGRAM(s) Oral daily  aspirin enteric coated 81 milliGRAM(s) Oral daily  atorvastatin 40 milliGRAM(s) Oral at bedtime  budesonide 160 MICROgram(s)/formoterol 4.5 MICROgram(s) Inhaler 2 Puff(s) Inhalation two times a day  furosemide   Injectable 40 milliGRAM(s) IV Push two times a day  hydrALAZINE 25 milliGRAM(s) Oral every 8 hours  metoprolol succinate ER 50 milliGRAM(s) Oral daily  multivitamin 1 Tablet(s) Oral daily  pantoprazole    Tablet 40 milliGRAM(s) Oral before breakfast  senna 2 Tablet(s) Oral at bedtime  tiotropium 2.5 MICROgram(s) Inhaler 2 Puff(s) Inhalation daily    MEDICATIONS  (PRN):  acetaminophen     Tablet .. 650 milliGRAM(s) Oral every 6 hours PRN Temp greater or equal to 38C (100.4F), Mild Pain (1 - 3)  albuterol    90 MICROgram(s) HFA Inhaler 2 Puff(s) Inhalation every 6 hours PRN Shortness of Breath and/or Wheezing  aluminum hydroxide/magnesium hydroxide/simethicone Suspension 30 milliLiter(s) Oral every 4 hours PRN Dyspepsia  melatonin 3 milliGRAM(s) Oral at bedtime PRN Insomnia  ondansetron Injectable 4 milliGRAM(s) IV Push every 8 hours PRN Nausea and/or Vomiting                         Patient is an 89 year-old male with a PMH of CHF, chronic Afib (not compliant with Eliquis), CKD stage 3, and COPD (smokes 3 cigars daily) who presented to the ED BIBA on 11/22/23 with worsening shortness of breath that he noticed the morning of admission and increased bilateral leg swelling over the past week. Was recently admitted in October 2023 for shortness of breath. Was found to have a pleural effusion. He was diuresed and patient left AMA.   He is currently admitted with acute hypoxic respiratory failure secondary to CHF exacerbation and large L sided pleural effusion.     Overnight Events: None  Interval HPI: Patient seen and examined at bedside. Patient states he feels very fatigued today, not sleeping well and thinks this is why. Denies chest pain     Vital Signs Last 24 Hrs  T(C): 36.4 (24 Nov 2023 05:23), Max: 36.5 (23 Nov 2023 14:02)  T(F): 97.6 (24 Nov 2023 05:23), Max: 97.7 (23 Nov 2023 14:02)  HR: 66 (24 Nov 2023 05:23) (64 - 81)  BP: 148/75 (24 Nov 2023 05:23) (120/58 - 148/75)  BP(mean): --  RR: 18 (24 Nov 2023 05:23) (18 - 18)  SpO2: 95% (24 Nov 2023 05:23) (92% - 100%)    Parameters below as of 24 Nov 2023 05:23  Patient On (Oxygen Delivery Method): room air    Physical Exam:  GENERAL: NAD, sitting up in chair   NERVOUS SYSTEM:  Alert & Oriented x 3  HEAD:  Atraumatic, Normocephalic  EYES: EOMI, conjunctiva clear and sclera white   NECK: Supple   CHEST/LUNG: poor air entry L lower lobe  HEART: irregular rate and rhythm   ABDOMEN: Soft, Nontender, Nondistended   EXTREMITIES:  2+ bilateral pitting edema     LABS:  Personally reviewed.                    10.7   10.09 )-----------( 170      ( 24 Nov 2023 06:55 )             33.7     23 Nov 2023 07:03    142    |  102    |  53     ----------------------------<  124    3.8     |  29     |  1.66     Ca    8.6        23 Nov 2023 07:03  Mg     2.4       23 Nov 2023 07:03    TPro  7.1    /  Alb  2.7    /  TBili  0.5    /  DBili  x      /  AST  21     /  ALT  15     /  AlkPhos  63     23 Nov 2023 07:03    PT/INR - ( 22 Nov 2023 13:35 )   PT: 23.6 sec;   INR: 2.12 ratio         PTT - ( 22 Nov 2023 13:35 )  PTT:37.9 sec  CAPILLARY BLOOD GLUCOSE        LIVER FUNCTIONS - ( 23 Nov 2023 07:03 )  Alb: 2.7 g/dL / Pro: 7.1 g/dL / ALK PHOS: 63 U/L / ALT: 15 U/L / AST: 21 U/L / GGT: x           Urinalysis Basic - ( 23 Nov 2023 07:03 )    Color: x / Appearance: x / SG: x / pH: x  Gluc: 124 mg/dL / Ketone: x  / Bili: x / Urobili: x   Blood: x / Protein: x / Nitrite: x   Leuk Esterase: x / RBC: x / WBC x   Sq Epi: x / Non Sq Epi: x / Bacteria: x             IMAGING:  All imaging personally reviewed.    TTE Echo Limited or F/U (10.16.23 @ 12:43)   Summary:   1. Limited echocardiogram performed.   2. Low normal global left ventricular systolic function.   3. Left ventricular ejection fraction, by visual estimation, is 50%.   4. Normal right ventricular size and function.   5. Left atrial enlargement.   6. Right atrial enlargement.   7. Moderate thickening and calcification of the anterior and posterior   mitral valve leaflets.   8. Moderately decreased mitral leaflet mobility.   9. Moderate mitral valve stenosis (mean gradient    7 mmHg at HR 77 BPM, MVA 1.1 cm^2).  10. Mild tricuspid regurgitation.  11. Large pleural effusion in the left lateral region.  12. Small pericardial effusion.    Xray Chest 1 View AP/PA (11.22.23 @ 14:15)  IMPRESSION: Moderate to large left pleural effusion    CT Chest No Cont (11.22.23 @ 14:44)   IMPRESSION:  Cardiomegaly with large left pleural effusion as seen on the chest x-ray    MEDS:  MEDICATIONS  (STANDING):  allopurinol 100 milliGRAM(s) Oral daily  aspirin enteric coated 81 milliGRAM(s) Oral daily  atorvastatin 40 milliGRAM(s) Oral at bedtime  budesonide 160 MICROgram(s)/formoterol 4.5 MICROgram(s) Inhaler 2 Puff(s) Inhalation two times a day  furosemide   Injectable 40 milliGRAM(s) IV Push two times a day  hydrALAZINE 25 milliGRAM(s) Oral every 8 hours  metoprolol succinate ER 50 milliGRAM(s) Oral daily  multivitamin 1 Tablet(s) Oral daily  pantoprazole    Tablet 40 milliGRAM(s) Oral before breakfast  senna 2 Tablet(s) Oral at bedtime  tiotropium 2.5 MICROgram(s) Inhaler 2 Puff(s) Inhalation daily    MEDICATIONS  (PRN):  acetaminophen     Tablet .. 650 milliGRAM(s) Oral every 6 hours PRN Temp greater or equal to 38C (100.4F), Mild Pain (1 - 3)  albuterol    90 MICROgram(s) HFA Inhaler 2 Puff(s) Inhalation every 6 hours PRN Shortness of Breath and/or Wheezing  aluminum hydroxide/magnesium hydroxide/simethicone Suspension 30 milliLiter(s) Oral every 4 hours PRN Dyspepsia  melatonin 3 milliGRAM(s) Oral at bedtime PRN Insomnia  ondansetron Injectable 4 milliGRAM(s) IV Push every 8 hours PRN Nausea and/or Vomiting                         Patient is an 89 year-old male with a PMH of CHF, chronic Afib (not compliant with Eliquis), CKD stage 3, and COPD (smokes 3 cigars daily) who presented to the ED BIBA on 11/22/23 with worsening shortness of breath that he noticed the morning of admission and increased bilateral leg swelling over the past week. Was recently admitted in October 2023 for shortness of breath. Was found to have a pleural effusion. He was diuresed and patient left AMA.   He is currently admitted with acute hypoxic respiratory failure secondary to CHF exacerbation and large L sided pleural effusion.     Overnight Events: None  Interval HPI: Patient seen and examined at bedside. Patient states he feels very fatigued today, not sleeping well and thinks this is why. Denies chest pain or palpitations, still short of breath, not on oxygen, dyspneic with conversation, satting 96% on RA. Discussed plan for thoracentesis today, continued diuresis. Patient denies any questions or concerns at this time. Tried to reach daughter to give updates, left message to call back.    Vital Signs Last 24 Hrs  T(C): 36.4 (24 Nov 2023 05:23), Max: 36.5 (23 Nov 2023 14:02)  T(F): 97.6 (24 Nov 2023 05:23), Max: 97.7 (23 Nov 2023 14:02)  HR: 66 (24 Nov 2023 05:23) (64 - 81)  BP: 148/75 (24 Nov 2023 05:23) (120/58 - 148/75)  BP(mean): --  RR: 18 (24 Nov 2023 05:23) (18 - 18)  SpO2: 95% (24 Nov 2023 05:23) (92% - 100%)    Parameters below as of 24 Nov 2023 05:23  Patient On (Oxygen Delivery Method): room air    Physical Exam:  GENERAL: NAD, sitting up in chair   NERVOUS SYSTEM:  Alert & Oriented x 3  HEAD:  Atraumatic, Normocephalic  EYES: EOMI, conjunctiva clear and sclera white   NECK: Supple   CHEST/LUNG: poor air entry LLL, otherwise CTAB  HEART: irregular rate and rhythm   ABDOMEN: Soft, Nontender, Nondistended   EXTREMITIES:  2+ bilateral pitting edema with weeping blisters     LABS:  Personally reviewed.                    10.7   10.09 )-----------( 170      ( 24 Nov 2023 06:55 )             33.7     23 Nov 2023 07:03    142    |  102    |  53     ----------------------------<  124    3.8     |  29     |  1.66     Ca    8.6        23 Nov 2023 07:03  Mg     2.4       23 Nov 2023 07:03    TPro  7.1    /  Alb  2.7    /  TBili  0.5    /  DBili  x      /  AST  21     /  ALT  15     /  AlkPhos  63     23 Nov 2023 07:03    PT/INR - ( 22 Nov 2023 13:35 )   PT: 23.6 sec;   INR: 2.12 ratio         PTT - ( 22 Nov 2023 13:35 )  PTT:37.9 sec  CAPILLARY BLOOD GLUCOSE        LIVER FUNCTIONS - ( 23 Nov 2023 07:03 )  Alb: 2.7 g/dL / Pro: 7.1 g/dL / ALK PHOS: 63 U/L / ALT: 15 U/L / AST: 21 U/L / GGT: x           Urinalysis Basic - ( 23 Nov 2023 07:03 )    Color: x / Appearance: x / SG: x / pH: x  Gluc: 124 mg/dL / Ketone: x  / Bili: x / Urobili: x   Blood: x / Protein: x / Nitrite: x   Leuk Esterase: x / RBC: x / WBC x   Sq Epi: x / Non Sq Epi: x / Bacteria: x             IMAGING:  All imaging personally reviewed.    TTE Echo Limited or F/U (10.16.23 @ 12:43)   Summary:   1. Limited echocardiogram performed.   2. Low normal global left ventricular systolic function.   3. Left ventricular ejection fraction, by visual estimation, is 50%.   4. Normal right ventricular size and function.   5. Left atrial enlargement.   6. Right atrial enlargement.   7. Moderate thickening and calcification of the anterior and posterior   mitral valve leaflets.   8. Moderately decreased mitral leaflet mobility.   9. Moderate mitral valve stenosis (mean gradient    7 mmHg at HR 77 BPM, MVA 1.1 cm^2).  10. Mild tricuspid regurgitation.  11. Large pleural effusion in the left lateral region.  12. Small pericardial effusion.    Xray Chest 1 View AP/PA (11.22.23 @ 14:15)  IMPRESSION: Moderate to large left pleural effusion    CT Chest No Cont (11.22.23 @ 14:44)   IMPRESSION:  Cardiomegaly with large left pleural effusion as seen on the chest x-ray    MEDS:  MEDICATIONS  (STANDING):  allopurinol 100 milliGRAM(s) Oral daily  aspirin enteric coated 81 milliGRAM(s) Oral daily  atorvastatin 40 milliGRAM(s) Oral at bedtime  budesonide 160 MICROgram(s)/formoterol 4.5 MICROgram(s) Inhaler 2 Puff(s) Inhalation two times a day  furosemide   Injectable 40 milliGRAM(s) IV Push two times a day  hydrALAZINE 25 milliGRAM(s) Oral every 8 hours  metoprolol succinate ER 50 milliGRAM(s) Oral daily  multivitamin 1 Tablet(s) Oral daily  pantoprazole    Tablet 40 milliGRAM(s) Oral before breakfast  senna 2 Tablet(s) Oral at bedtime  tiotropium 2.5 MICROgram(s) Inhaler 2 Puff(s) Inhalation daily    MEDICATIONS  (PRN):  acetaminophen     Tablet .. 650 milliGRAM(s) Oral every 6 hours PRN Temp greater or equal to 38C (100.4F), Mild Pain (1 - 3)  albuterol    90 MICROgram(s) HFA Inhaler 2 Puff(s) Inhalation every 6 hours PRN Shortness of Breath and/or Wheezing  aluminum hydroxide/magnesium hydroxide/simethicone Suspension 30 milliLiter(s) Oral every 4 hours PRN Dyspepsia  melatonin 3 milliGRAM(s) Oral at bedtime PRN Insomnia  ondansetron Injectable 4 milliGRAM(s) IV Push every 8 hours PRN Nausea and/or Vomiting                         Patient is an 89 year-old male with a PMH of CHF, chronic Afib (not compliant with Eliquis), CKD stage 3, and COPD (smokes 3 cigars daily) who presented to the ED BIBA on 11/22/23 with worsening shortness of breath that he noticed the morning of admission and increased bilateral leg swelling over the past week. Was recently admitted in October 2023 for shortness of breath. Was found to have a pleural effusion. He was diuresed and patient left AMA.   He is currently admitted with acute hypoxic respiratory failure secondary to CHF exacerbation and large L sided pleural effusion.     Overnight Events: None  Interval HPI: Patient seen and examined at bedside. Patient states he feels very fatigued today, not sleeping well and thinks this is why. Denies chest pain or palpitations, still short of breath, not on oxygen, dyspneic with conversation, satting 96% on RA. Discussed plan for thoracentesis today, continued diuresis. Patient denies any questions or concerns at this time. Tried to reach daughter to give updates, left message to call back.    Vital Signs Last 24 Hrs  T(C): 36.4 (24 Nov 2023 05:23), Max: 36.5 (23 Nov 2023 14:02)  T(F): 97.6 (24 Nov 2023 05:23), Max: 97.7 (23 Nov 2023 14:02)  HR: 66 (24 Nov 2023 05:23) (64 - 81)  BP: 148/75 (24 Nov 2023 05:23) (120/58 - 148/75)  BP(mean): --  RR: 18 (24 Nov 2023 05:23) (18 - 18)  SpO2: 95% (24 Nov 2023 05:23) (92% - 100%)    Parameters below as of 24 Nov 2023 05:23  Patient On (Oxygen Delivery Method): room air    Physical Exam:  GENERAL: NAD, sitting up in chair   NERVOUS SYSTEM:  Alert & Oriented x 3  HEAD:  Atraumatic, Normocephalic  EYES: EOMI, conjunctiva clear and sclera white   NECK: Supple   CHEST/LUNG: poor air entry LLL, otherwise CTAB  HEART: irregular rate and rhythm   ABDOMEN: Soft, Nontender, Nondistended   EXTREMITIES:  2+ bilateral pitting edema with weeping blisters     LABS:  Personally reviewed.                    10.7   10.09 )-----------( 170      ( 24 Nov 2023 06:55 )             33.7     23 Nov 2023 07:03    142    |  102    |  53     ----------------------------<  124    3.8     |  29     |  1.66     Ca    8.6        23 Nov 2023 07:03  Mg     2.4       23 Nov 2023 07:03    TPro  7.1    /  Alb  2.7    /  TBili  0.5    /  DBili  x      /  AST  21     /  ALT  15     /  AlkPhos  63     23 Nov 2023 07:03    PT/INR - ( 22 Nov 2023 13:35 )   PT: 23.6 sec;   INR: 2.12 ratio         PTT - ( 22 Nov 2023 13:35 )  PTT:37.9 sec  CAPILLARY BLOOD GLUCOSE        LIVER FUNCTIONS - ( 23 Nov 2023 07:03 )  Alb: 2.7 g/dL / Pro: 7.1 g/dL / ALK PHOS: 63 U/L / ALT: 15 U/L / AST: 21 U/L / GGT: x           Urinalysis Basic - ( 23 Nov 2023 07:03 )    Color: x / Appearance: x / SG: x / pH: x  Gluc: 124 mg/dL / Ketone: x  / Bili: x / Urobili: x   Blood: x / Protein: x / Nitrite: x   Leuk Esterase: x / RBC: x / WBC x   Sq Epi: x / Non Sq Epi: x / Bacteria: x             IMAGING:  All imaging personally reviewed.    No imaging to review     TTE Echo Limited or F/U (10.16.23 @ 12:43)   Summary:   1. Limited echocardiogram performed.   2. Low normal global left ventricular systolic function.   3. Left ventricular ejection fraction, by visual estimation, is 50%.   4. Normal right ventricular size and function.   5. Left atrial enlargement.   6. Right atrial enlargement.   7. Moderate thickening and calcification of the anterior and posterior   mitral valve leaflets.   8. Moderately decreased mitral leaflet mobility.   9. Moderate mitral valve stenosis (mean gradient    7 mmHg at HR 77 BPM, MVA 1.1 cm^2).  10. Mild tricuspid regurgitation.  11. Large pleural effusion in the left lateral region.  12. Small pericardial effusion.    Xray Chest 1 View AP/PA (11.22.23 @ 14:15)  IMPRESSION: Moderate to large left pleural effusion    CT Chest No Cont (11.22.23 @ 14:44)   IMPRESSION:  Cardiomegaly with large left pleural effusion as seen on the chest x-ray    MEDS:  MEDICATIONS  (STANDING):  allopurinol 100 milliGRAM(s) Oral daily  aspirin enteric coated 81 milliGRAM(s) Oral daily  atorvastatin 40 milliGRAM(s) Oral at bedtime  budesonide 160 MICROgram(s)/formoterol 4.5 MICROgram(s) Inhaler 2 Puff(s) Inhalation two times a day  furosemide   Injectable 40 milliGRAM(s) IV Push two times a day  hydrALAZINE 25 milliGRAM(s) Oral every 8 hours  metoprolol succinate ER 50 milliGRAM(s) Oral daily  multivitamin 1 Tablet(s) Oral daily  pantoprazole    Tablet 40 milliGRAM(s) Oral before breakfast  senna 2 Tablet(s) Oral at bedtime  tiotropium 2.5 MICROgram(s) Inhaler 2 Puff(s) Inhalation daily    MEDICATIONS  (PRN):  acetaminophen     Tablet .. 650 milliGRAM(s) Oral every 6 hours PRN Temp greater or equal to 38C (100.4F), Mild Pain (1 - 3)  albuterol    90 MICROgram(s) HFA Inhaler 2 Puff(s) Inhalation every 6 hours PRN Shortness of Breath and/or Wheezing  aluminum hydroxide/magnesium hydroxide/simethicone Suspension 30 milliLiter(s) Oral every 4 hours PRN Dyspepsia  melatonin 3 milliGRAM(s) Oral at bedtime PRN Insomnia  ondansetron Injectable 4 milliGRAM(s) IV Push every 8 hours PRN Nausea and/or Vomiting

## 2023-11-24 NOTE — PROGRESS NOTE ADULT - ASSESSMENT
{\rtf1\kdsjok91979\ansi\dlcinkr5520\ftnbj\uc1\deff0  {\fonttbl{\f0 \fnil Segoe UI;}{\f1 \fnil \fcharset0 Segoe UI;}{\f2 \fnil Times New [place];}}  {\colortbl ;\ofw641\lnocv177\duoa879 ;\red0\green0\blue0 ;\red0\green0\blue0 ;}  {\stylesheet{\f0\fs20 Normal;}{\cs1 Default Paragraph Font;}{\cs2\f0\fs16 Line Number;}{\cs3\f2\fs24 Hyperlink;}}  {\*\revtbl{Unknown;}}  \tbinqx97187\yhokhe07891\nxvkt6125\yyfun1575\zjtaj3003\zbxhe6404\vcuxkzy923\pnrznye476\nogrowautofit\reghls415\formshade\nofeaturethrottle1\dntblnsbdb\fet4\aendnotes\aftnnrlc\pgbrdrhead\pgbrdrfoot  \sectd\rzyxup73910\felwsi79047\guttersxn0\yfhgzxpr7363\qaeoyugr0483\tgkwpsda2647\vzklohia4737\iaddnzz704\vpcsrgw298\sbkpage\pgncont\pgndec  \plain\plain\f0\fs24\ql\plain\f0\fs24\plain\f0\fs20\hyap7114\hich\f0\dbch\f0\loch\f0\fs20 Physical Examination:\par  GENERAL:               Alert, Oriented, No acute distress.  \par  HEENT:                    No JVD, dry MM\par  PULM:                     Bilateral air entry, diminished to auscultation bilaterally L>R, no significant sputum production, No Rales, No Rhonchi, No Wheezing\par  CVS:                         S1, S2,  +Murmur\par  ABD:                        Soft, nondistended, nontender, normoactive bowel sounds, \par  EXT:                         ++ edema, nontender, No Cyanosis or Clubbing \par  Vascular:                Warm Extremities,  \par  NEURO:                  Alert, oriented, interactive, nonfocal, follows commands\par  PSYC:                      Calm, + Insight.\par  \par  \par  \par  \plain\f1\fs20\kyjz3586\hich\f1\dbch\f1\loch\f1\cf2\fs20\strike\plain\f1\fs20\skov0329\hich\f1\dbch\f1\loch\f1\cf2\fs20\plain\f1\fs20\qspx6884\hich\f1\dbch\f1\loch\f1\cf2\fs20\b Assessment\par  Dyspnea suspect due to acute on chronic Diastolic CHF\par  Chronic Left pleural effusions \par  \plain\f0\fs20\ezab0813\hich\f0\dbch\f0\loch\f0\fs20\par  Afib on a/c\par  Cigar smoker, at risk for copd. \par  CKD\par  \par  \plain\f1\fs20\mggb3031\hich\f1\dbch\f1\loch\f1\cf2\fs20\b Plan\par  Case d/w patient and now agreeable to thoracentesis, will plan for today\par  lovenox held\par  \par  Appreciate cardio  input, diuresis \par  \plain\f0\fs20\thvt6254\hich\f0\dbch\f0\loch\f0\fs20 monitor on room air\par  out of bed as tolerated\par  \par   {\*\bkmkstart bkcommentCR}{\*\bkmkend bkcommentCR} \par  }

## 2023-11-24 NOTE — PROGRESS NOTE ADULT - PROBLEM SELECTOR PLAN 1
- EMS noted patient with SpO2 of 88% on room air   - Likely due to CHF exacerbation and pleural effusion as patient presenting with worsening SOB/increased leg swelling   - ECHO from 10/16/23: EF of 50% and left sided pleural effusion  - CXR from 11/22/23: moderate to large LEFT pleural effusion   - CT chest from 11/22/23: cardiomegaly with large left pleural effusion as seen on the chest x-ray  - pro-BNP of 3788 upon admission  - s/p IV Lasix 80 mg in the ED   - Will continue IV Lasix 40 mg BID   - Continue metoprolol 50 mg QD  - Supplemental oxygen   - Strict I&Os   - F/u cardio consult   - F/u pulmonary consult - possible thoracentesis tomorrow for large pleural effusion - EMS noted patient with SpO2 of 88% on room air   - Likely due to CHF exacerbation and pleural effusion as patient presenting with worsening SOB/increased leg swelling   - ECHO from 10/16/23: EF of 50% and left sided pleural effusion  - CXR from 11/22/23: moderate to large LEFT pleural effusion   - CT chest from 11/22/23: cardiomegaly with large left pleural effusion as seen on the chest x-ray  - pro-BNP of 3788 upon admission  - s/p IV Lasix 80 mg in the ED   - Will continue IV Lasix 40 mg BID   - Continue metoprolol 50 mg QD  - Supplemental oxygen   - Strict I&Os   - F/u cardio consult   - F/u pulmonary consult - thoracentesis today - EMS noted patient with SpO2 of 88% on room air   - Likely due to CHF exacerbation and pleural effusion as patient presenting with worsening SOB/increased leg swelling   - ECHO from 10/16/23: EF of 50% and left sided pleural effusion  - CXR from 11/22/23: moderate to large LEFT pleural effusion   - CT chest from 11/22/23: cardiomegaly with large left pleural effusion as seen on the chest x-ray  - Pro-BNP of 3788 upon admission, will F/U AM repeat  - S/p IV Lasix 80 mg in the ED   - C/w IV Lasix 40 mg BID   - C/w metoprolol 50 mg QD  - Supplemental oxygen PRN, not currently using  - Strict I&Os, fluid restriction 1.2L, low sodium diet  - F/u cardio consult - c/w diuresis, may need to add metolazone   - F/u pulmonary consult - thoracentesis today w/ sanguinous output, hold AC, F/U 6pm CBC

## 2023-11-24 NOTE — PROGRESS NOTE ADULT - PROBLEM SELECTOR PLAN 3
- GFR 39   - not on ACE/ARBS due to renal function   - F/u AM BMP - monitor BUN/CR while on Lasix - darren on ckd sec to diuresis  -  GFR 39   - not on ACE/ARBS due to renal function   - F/u AM BMP - monitor BUN/CR while on Lasix - Stage 3b  - Baseline average Cr ~ 1.55, eGFR ~ 43  - Not on ACE/ARBS due to renal function   - Continue to monitor BUN/CR while on Lasix  - Strict I&Os, maintaining appropriate output >0.5mL/kg/hr

## 2023-11-24 NOTE — PROGRESS NOTE ADULT - ASSESSMENT
acute and chronic hf  cad  af  prior cva  pleural effusion due to chf      suggest  aggressive diuresis, may increase lasix or add zaroxylyn  monitor lytes, bun

## 2023-11-24 NOTE — PROGRESS NOTE ADULT - PROBLEM SELECTOR PLAN 4
- Continue atorvastatin 40 mg   - Continue aspirin 81 mg - Hx of CVA   - C/w atorvastatin 40 mg   - C/w aspirin 81 mg

## 2023-11-24 NOTE — PROGRESS NOTE ADULT - PROBLEM SELECTOR PLAN 5
- Stable; smokes 3 cigars daily   - Continue Symbicort - Stable; smokes 3 cigars daily   - Continue Symbicort  - Not interested in nicotine patch at this time

## 2023-11-24 NOTE — PROGRESS NOTE ADULT - ATTENDING COMMENTS
89 y/o M with a PMH of HFpEF, chronic Afib (not compliant with Eliquis), CKD stage 3, and COPD (smokes 3 cigars daily) who presented to the ED BIBA on 11/22/23 with worsening shortness of breath that he noticed the morning of admission and increased bilateral leg swelling over the past week. He is currently admitted with acute hypoxic respiratory failure secondary to acute on chronic HFpEF exacerbation and large L sided pleural effusion Plan: thoracentesis tomorrow as per pulm, apprec pulm and cardio recs and collaboration in care, iv diuresis, trend renal function, monitor i/os, daily weight, fluid and sodium restriction, trend probnp, complex care coordination 89 y/o M with a PMH of HFpEF, chronic Afib (not compliant with Eliquis), CKD stage 3, and COPD (smokes 3 cigars daily) who presented to the ED BIBA on 11/22/23 with worsening shortness of breath that he noticed the morning of admission and increased bilateral leg swelling over the past week. He is currently admitted with acute hypoxic respiratory failure secondary to acute on chronic HFpEF exacerbation, darren on ckd 3 and large L sided pleural effusion Plan: thoracentesis today as per pulm, apprec pulm and cardio recs and collaboration in care, iv diuresis, trend renal function, monitor i/os, daily weight, fluid and sodium restriction, trend probnp, complex care coordination 89 y/o M with a PMH of HFpEF, chronic Afib (not compliant with Eliquis), CKD stage 3, and COPD (smokes 3 cigars daily) who presented to the ED BIBA on 11/22/23 with worsening shortness of breath that he noticed the morning of admission and increased bilateral leg swelling over the past week. He is currently admitted with acute hypoxic respiratory failure secondary to acute on chronic HFpEF exacerbation, darren on ckd 3 and large L sided pleural effusion Plan: thoracentesis today as per pulm, apprec pulm and cardio recs and collaboration in care, iv diuresis, trend renal function, monitor i/os, daily weight, fluid and sodium restriction, trend probnp, complex care coordination, bloody effusion as per pulm hold AC

## 2023-11-24 NOTE — PROCEDURE NOTE - ADDITIONAL PROCEDURE DETAILS
d/w Dr. Mazariegos post procedure will hold off restarting a/c today  repeat cbc in am and this evening  post procedure cxr pl effusion significantly decreased no pntx,

## 2023-11-24 NOTE — PROGRESS NOTE ADULT - PROBLEM SELECTOR PLAN 2
- chronic  - Continue metoprolol 50 mg QD   - HX of CVA - should be on anticoagulation   - Given age and renal function was prescribed Eliquis 2.5 mg BID - patient is not compliant - chronic  - Continue metoprolol 50 mg QD   - HX of CVA - should be on anticoagulation   - Given age and renal function was prescribed Eliquis 2.5 mg BID - patient is non adherent to care - Chronic  - C/w metoprolol 50 mg QD   - HX of CVA - should be on anticoagulation but holding given  - Given age and renal function was prescribed Eliquis 2.5 mg BID - patient is non adherent to care - Chronic, stable  - C/w metoprolol 50 mg QD   - HX of CVA - should be on anticoagulation but holding given sanguinous output, F/U CBC 6pm  - Given age and renal function was prescribed Eliquis 2.5 mg BID - patient is non adherent to care

## 2023-11-24 NOTE — PROGRESS NOTE ADULT - TIME BILLING
care coordination, plan of care discussed with patient face to face, holiday IDR team, Dr Buckley cardio care coordination, plan of care discussed with patient face to face, holiday IDR team, Dr Buckley cardio, Dr Hernandez pulm

## 2023-11-24 NOTE — PROCEDURE NOTE - NSTOLERANCE_GEN_A_CORE
patient complain of sob, cough and chest pressure and procedure stopped, symptoms resolved after 10 min

## 2023-11-24 NOTE — PROGRESS NOTE ADULT - SUBJECTIVE AND OBJECTIVE BOX
Follow up for :    acute and chronic HF    SUBJ:  c/o edema and adbominal swelling    PMH  Afib    Congestive heart failure (CHF)    CVA (cerebral vascular accident)    Hypertension, unspecified type    Chronic obstructive pulmonary disease (COPD)        MEDICATIONS  (STANDING):  allopurinol 100 milliGRAM(s) Oral daily  aspirin enteric coated 81 milliGRAM(s) Oral daily  atorvastatin 40 milliGRAM(s) Oral at bedtime  budesonide 160 MICROgram(s)/formoterol 4.5 MICROgram(s) Inhaler 2 Puff(s) Inhalation two times a day  furosemide   Injectable 40 milliGRAM(s) IV Push two times a day  hydrALAZINE 25 milliGRAM(s) Oral every 8 hours  metoprolol succinate ER 50 milliGRAM(s) Oral daily  multivitamin 1 Tablet(s) Oral daily  pantoprazole    Tablet 40 milliGRAM(s) Oral before breakfast  senna 2 Tablet(s) Oral at bedtime  tiotropium 2.5 MICROgram(s) Inhaler 2 Puff(s) Inhalation daily    MEDICATIONS  (PRN):  acetaminophen     Tablet .. 650 milliGRAM(s) Oral every 6 hours PRN Temp greater or equal to 38C (100.4F), Mild Pain (1 - 3)  albuterol    90 MICROgram(s) HFA Inhaler 2 Puff(s) Inhalation every 6 hours PRN Shortness of Breath and/or Wheezing  aluminum hydroxide/magnesium hydroxide/simethicone Suspension 30 milliLiter(s) Oral every 4 hours PRN Dyspepsia  melatonin 3 milliGRAM(s) Oral at bedtime PRN Insomnia  ondansetron Injectable 4 milliGRAM(s) IV Push every 8 hours PRN Nausea and/or Vomiting        PHYSICAL EXAM:  Vital Signs Last 24 Hrs  T(C): 36.6 (24 Nov 2023 08:08), Max: 36.6 (24 Nov 2023 08:08)  T(F): 97.8 (24 Nov 2023 08:08), Max: 97.8 (24 Nov 2023 08:08)  HR: 56 (24 Nov 2023 08:08) (56 - 81)  BP: 142/- (24 Nov 2023 08:08) (120/58 - 148/75)  BP(mean): --  RR: 18 (24 Nov 2023 08:08) (18 - 18)  SpO2: 95% (24 Nov 2023 08:08) (92% - 100%)    Parameters below as of 24 Nov 2023 08:08  Patient On (Oxygen Delivery Method): room air        GENERAL: NAD, well-groomed, well-developed  HEAD:  Atraumatic, Normocephalic  EYES:  conjunctiva and sclera clear  ENT: Moist mucous membranes,  NECK: Supple,JVP mildly elevated  CHEST/LUNG: absent BS left base  HEART:  No murmurs, rubs, or gallops PMI non displaced.  ABDOMEN: distended  EXTREMITIES:   3+ edema  NERVOUS SYSTEM:  Alert       TELEMETRY:   af 50-60    LABS:                        10.7   10.09 )-----------( 170      ( 24 Nov 2023 06:55 )             33.7     11-24    141  |  103  |  56<H>  ----------------------------<  96  4.0   |  28  |  1.67<H>    Ca    8.7      24 Nov 2023 06:55  Phos  4.0     11-24  Mg     2.4     11-24    TPro  7.1  /  Alb  2.7<L>  /  TBili  0.5  /  DBili  x   /  AST  21  /  ALT  15  /  AlkPhos  63  11-23        Troponin I, High Sensitivity Result: 47.0 ng/L (11-22-23 @ 13:35)    PT/INR - ( 22 Nov 2023 13:35 )   PT: 23.6 sec;   INR: 2.12 ratio         PTT - ( 22 Nov 2023 13:35 )  PTT:37.9 sec    I&O's Summary    23 Nov 2023 07:01  -  24 Nov 2023 07:00  --------------------------------------------------------  IN: 200 mL / OUT: 900 mL / NET: -700 mL          RADIOLOGY & ADDITIONAL STUDIES:    ECHO:

## 2023-11-25 NOTE — PROGRESS NOTE ADULT - ATTENDING COMMENTS
89 y/o M with a PMH of HFpEF, chronic Afib (not compliant with Eliquis), CKD stage 3, and COPD (smokes 3 cigars daily) who presented to the ED BIBA on 11/22/23 with worsening shortness of breath that he noticed the morning of admission and increased bilateral leg swelling over the past week. He is currently admitted with acute hypoxic respiratory failure secondary to acute on chronic HFpEF exacerbation, darren on ckd 3 and large L sided pleural effusion s/p 900ml thoracenteiss, bloody efffusion noted AC held Plan: cont diuresis, monitor volume status, apprec pulm and cardio recs and collaboration in care, iv diuresis, trend renal function, monitor i/os, daily weight, fluid and sodium restriction, trend probnp, complex care coordination, bloody effusion as per pulm hold AC

## 2023-11-25 NOTE — PROGRESS NOTE ADULT - CONVERSATION DETAILS
Still wanting to remain FULL code with HCP daughter Frida.     Patient states felt better after thoracentesis, wondering if there many be palliative options to complete therapeutic thoracentesis outpatient instead of having to come to hospital, will discuss with Pulm as unfamiliar with palliative measures available in this area, more discussion to be had once results from thoracentesis are finalized.

## 2023-11-25 NOTE — PROGRESS NOTE ADULT - PROBLEM SELECTOR PLAN 5
- Stable; smokes 3 cigars daily   - Continue Symbicort  - Not interested in nicotine patch at this time

## 2023-11-25 NOTE — PROGRESS NOTE ADULT - ASSESSMENT
Patient is an 88 y/o M with a PMH of HFpEF, chronic Afib (not compliant with Eliquis), CKD stage 3, and COPD (smokes 3 cigars daily) who presented to the ED BIBA on 11/22/23 with worsening shortness of breath that he noticed the morning of admission and increased bilateral leg swelling over the past week. He is currently admitted with acute hypoxic respiratory failure secondary to acute on chronic HFpEF exacerbation and large L sided pleural effusion. S/p thoracentesis yesterday, to continue with diuresis.    Discussed with attending, Dr. Licea   Patient is an 88 y/o M with a PMH of HFpEF, chronic Afib (not compliant with Eliquis), CKD stage 3, and COPD (smokes 3 cigars daily) who presented to the ED BIBA on 11/22/23 with worsening shortness of breath that he noticed the morning of admission and increased bilateral leg swelling over the past week. He is currently admitted with acute hypoxic respiratory failure secondary to acute on chronic HFpEF exacerbation and large L sided pleural effusion. S/p thoracentesis 900ml extracted yesterday, to continue with diuresis.    Discussed with attending, Dr. Licea

## 2023-11-25 NOTE — PROGRESS NOTE ADULT - PROBLEM SELECTOR PLAN 3
- Stage 3b  - Baseline average Cr ~ 1.55, eGFR ~ 43  - 1.71 today   - Not on ACE/ARBS due to renal function   - Continue to monitor BUN/CR while on Lasix  - Strict I&Os, maintaining appropriate output >0.5mL/kg/hr - now on darren on ckd  - Baseline average Cr ~ 1.55, eGFR ~ 43  - 1.71 today   - Not on ACE/ARBS due to renal function   - Continue to monitor BUN/CR while on Lasix  - Strict I&Os, maintaining appropriate output >0.5mL/kg/hr  -if continues to uptrend will consult nephro

## 2023-11-25 NOTE — PROGRESS NOTE ADULT - SUBJECTIVE AND OBJECTIVE BOX
Follow up for  SUBJ:    no cp or sob. sp thoracentesis results pending.     PMH  Afib    Congestive heart failure (CHF)    CVA (cerebral vascular accident)    Hypertension, unspecified type    Chronic obstructive pulmonary disease (COPD)        MEDICATIONS  (STANDING):  allopurinol 100 milliGRAM(s) Oral daily  apixaban 2.5 milliGRAM(s) Oral every 12 hours  aspirin enteric coated 81 milliGRAM(s) Oral daily  atorvastatin 40 milliGRAM(s) Oral at bedtime  budesonide 160 MICROgram(s)/formoterol 4.5 MICROgram(s) Inhaler 2 Puff(s) Inhalation two times a day  furosemide   Injectable 40 milliGRAM(s) IV Push two times a day  hydrALAZINE 25 milliGRAM(s) Oral every 8 hours  metolazone 5 milliGRAM(s) Oral daily  metoprolol succinate ER 50 milliGRAM(s) Oral daily  multivitamin 1 Tablet(s) Oral daily  pantoprazole    Tablet 40 milliGRAM(s) Oral before breakfast  senna 2 Tablet(s) Oral at bedtime  tiotropium 2.5 MICROgram(s) Inhaler 2 Puff(s) Inhalation daily    MEDICATIONS  (PRN):  acetaminophen     Tablet .. 650 milliGRAM(s) Oral every 6 hours PRN Temp greater or equal to 38C (100.4F), Mild Pain (1 - 3)  albuterol    90 MICROgram(s) HFA Inhaler 2 Puff(s) Inhalation every 6 hours PRN Shortness of Breath and/or Wheezing  aluminum hydroxide/magnesium hydroxide/simethicone Suspension 30 milliLiter(s) Oral every 4 hours PRN Dyspepsia  melatonin 3 milliGRAM(s) Oral at bedtime PRN Insomnia  ondansetron Injectable 4 milliGRAM(s) IV Push every 8 hours PRN Nausea and/or Vomiting        PHYSICAL EXAM:  Vital Signs Last 24 Hrs  T(C): 36.3 (25 Nov 2023 12:00), Max: 36.6 (24 Nov 2023 20:25)  T(F): 97.3 (25 Nov 2023 12:00), Max: 97.9 (25 Nov 2023 04:49)  HR: 71 (25 Nov 2023 12:00) (67 - 72)  BP: 135/- (25 Nov 2023 12:00) (122/64 - 143/71)  BP(mean): --  RR: 18 (25 Nov 2023 12:00) (18 - 18)  SpO2: 94% (25 Nov 2023 12:00) (93% - 96%)    Parameters below as of 25 Nov 2023 12:00  Patient On (Oxygen Delivery Method): room air        GENERAL: NAD, somewhat unkempt  HEAD:  Atraumatic, Normocephalic  EYES: EOMI, PERRLA, conjunctiva and sclera clear  ENT: Moist mucous membranes,  NECK: Supple, No JVD, no bruits  CHEST/LUNG: Clear to percussion bilaterally; No rales, rhonchi, wheezing, or rubs  HEART: Regular rate and rhythm; No murmurs, rubs, or gallops PMI non displaced.  ABDOMEN: Soft, Nontender, Nondistended; Bowel sounds present  EXTREMITIES:  2+ Peripheral Pulses, No clubbing, cyanosis, or edema  SKIN: No rashes or lesions  NERVOUS SYSTEM:  Cranial Nerves II-XII intact      TELEMETRY:    ECG:    < from: 12 Lead ECG (11.22.23 @ 13:01) >  Diagnosis Line Atrial fibrillation with premature ventricular or aberrantly conducted complexes  ST and T wave abnormality, consider inferolateral ischemia  Abnormal ECG  When compared with ECG of 15-OCT-2023 22:00,  T wave inversion now evident in Lateral leads  Confirmed by AMY STANFORD, HEBERT FENG ([id number]) on 11/23/2023 9:29:36 AM    < end of copied text >    ECHO:    < from: TTE Echo Limited or F/U (10.16.23 @ 12:43) >  Summary:   1. Limited echocardiogram performed.   2. Low normal global left ventricular systolic function.   3. Left ventricular ejection fraction, by visual estimation, is 50%.   4. Normal right ventricular size and function.   5. Left atrial enlargement.   6. Right atrial enlargement.   7. Moderate thickening and calcification of the anterior and posterior   mitral valve leaflets.   8. Moderately decreased mitral leaflet mobility.   9. Moderate mitral valve stenosis (mean gradient    7 mmHg at HR 77 BPM, MVA 1.1 cm^2).  10. Mild tricuspid regurgitation.  11. Large pleural effusion in the left lateral region.  12. Small pericardial effusion.    [id number] Kevin Hong MD Electronically signed on   10/16/2023 at 1:47:28 PM        *** Final ***    < end of copied text >      LABS:                        10.7   8.60  )-----------( 162      ( 25 Nov 2023 05:45 )             33.9     11-25    144  |  104  |  52<H>  ----------------------------<  102<H>  3.8   |  31  |  1.71<H>    Ca    8.7      25 Nov 2023 05:45  Phos  4.3     11-25  Mg     2.4     11-25      I&O's Summary    24 Nov 2023 07:01  -  25 Nov 2023 07:00  --------------------------------------------------------  IN: 0 mL / OUT: 400 mL / NET: -400 mL    25 Nov 2023 07:01  -  25 Nov 2023 18:22  --------------------------------------------------------  IN: 200 mL / OUT: 400 mL / NET: -200 mL      BNP    RADIOLOGY & ADDITIONAL STUDIES:    < from: Xray Chest 1 View AP/PA (11.22.23 @ 14:15) >  INTERPRETATION:  Exam:XR CHEST    clinical history:Shortness of Breath    Moderate to large left pleural effusion. Right lung shows no acute   infiltrate. No pneumothorax.    IMPRESSION: Moderate to large left pleural effusion    --- End of Report ---        DG CHAMPAGNE MD; Attending Radiologist  This document has been electronically signed. Nov 23 2023  8:51AM    < end of copied text >      Impression   left pleural effusion status post thoracentesis ejection fraction 50% BNP 3595 creatinine 1.71 BUN 52 hematocrit 34 hemoglobin 10.7 will continue diuretic atrial fibrillation anticoagulated

## 2023-11-25 NOTE — PROGRESS NOTE ADULT - PROBLEM SELECTOR PLAN 2
- Chronic, stable  - C/w metoprolol 50 mg QD   - HX of CVA - should be on anticoagulation but holding given sanguinous output  - Given age and renal function was prescribed Eliquis 2.5 mg BID - patient is non adherent to care - Chronic, stable  - C/w metoprolol 50 mg QD   - HX of CVA - should be on anticoagulation but holding given serosanguinous output  - Given age and renal function was prescribed Eliquis 2.5 mg BID - patient is non adherent to care

## 2023-11-25 NOTE — PROGRESS NOTE ADULT - PROBLEM SELECTOR PLAN 1
- EMS noted patient with SpO2 of 88% on room air   - Likely due to CHF exacerbation and pleural effusion as patient presenting with worsening SOB/increased leg swelling   - ECHO from 10/16/23: EF of 50% and left sided pleural effusion  - CXR from 11/22/23: moderate to large LEFT pleural effusion   - CT chest from 11/22/23: cardiomegaly with large left pleural effusion as seen on the chest x-ray  - Pro-BNP of 3788 upon admission, 3595 today  - Supplemental oxygen PRN, not currently using  - Strict I&Os, fluid restriction 1.2L, low sodium diet  - S/p IV Lasix 80 mg in the ED   - C/w IV Lasix 40 mg BID   - Add metolazone 5mg QD   - C/w metoprolol 50 mg QD  - F/u cardio consult - c/w diuresis  - F/u pulmonary consult - thoracentesis w/ sanguinous output, hold AC, F/U CXR - EMS noted patient with SpO2 of 88% on room air   - Likely due to CHF exacerbation and pleural effusion as patient presenting with worsening SOB/increased leg swelling   - ECHO from 10/16/23: EF of 50% and left sided pleural effusion  - CXR from 11/22/23: moderate to large LEFT pleural effusion   - CT chest from 11/22/23: cardiomegaly with large left pleural effusion as seen on the chest x-ray  - Pro-BNP of 3788 upon admission, 3595 today  - Supplemental oxygen PRN, not currently using  - Strict I&Os, fluid restriction 1.2L, low sodium diet  - S/p IV Lasix 80 mg in the ED   - C/w IV Lasix 40 mg BID   - Add metolazone 5mg QD   - C/w metoprolol 50 mg QD  - F/u cardio consult - c/w diuresis  - F/u pulmonary consult - thoracentesis w/ 900ml serosanguinous output, hold AC, F/U CXR

## 2023-11-25 NOTE — PROGRESS NOTE ADULT - SUBJECTIVE AND OBJECTIVE BOX
Patient is an 89 year-old male with a PMH of CHF, chronic Afib (not compliant with Eliquis), CKD stage 3, and COPD (smokes 3 cigars daily) who presented to the ED BIBA on 11/22/23 with worsening shortness of breath that he noticed the morning of admission and increased bilateral leg swelling over the past week. Was recently admitted in October 2023 for shortness of breath. Was found to have a pleural effusion. He was diuresed and patient left AMA.   He is currently admitted with acute hypoxic respiratory failure secondary to CHF exacerbation and large L sided pleural effusion.     Overnight Events: None  Interval HPI: Patient seen and examined at bedside. Patient states his fatigue is better than yesterday but is still present. Denies chest pain or palpitations, still short of breath, not on oxygen, dyspneic with conversation, satting 93% on RA. Dyspneawas improved after thoracentesis yesterday. Patient denies any questions or concerns at this time.     Vital Signs Last 24 Hrs  T(C): 36.6 (25 Nov 2023 04:49), Max: 36.6 (24 Nov 2023 12:08)  T(F): 97.9 (25 Nov 2023 04:49), Max: 97.9 (25 Nov 2023 04:49)  HR: 67 (25 Nov 2023 09:23) (67 - 74)  BP: 143/71 (25 Nov 2023 04:49) (122/64 - 143/71)  BP(mean): --  RR: 18 (25 Nov 2023 04:49) (18 - 18)  SpO2: 93% (25 Nov 2023 09:23) (93% - 96%)    Parameters below as of 25 Nov 2023 09:23  Patient On (Oxygen Delivery Method): room air      Physical Exam:  GENERAL: NAD, sitting up in chair   NERVOUS SYSTEM:  Alert & Oriented x 3  HEAD:  Atraumatic, Normocephalic  EYES: EOMI, conjunctiva clear and sclera white   NECK: Supple   CHEST/LUNG: improved air entry LLL, otherwise CTAB  HEART: irregular rate and rhythm   ABDOMEN: Soft, Nontender, Nondistended   EXTREMITIES:  2+ bilateral pre-tibial pitting edema, currently compression wrapped, pedal edema b/l    LABS:  Personally reviewed.                    10.7   10.09 )-----------( 170      ( 24 Nov 2023 06:55 )             33.7     23 Nov 2023 07:03    142    |  102    |  53     ----------------------------<  124    3.8     |  29     |  1.66     Ca    8.6        23 Nov 2023 07:03  Mg     2.4       23 Nov 2023 07:03    TPro  7.1    /  Alb  2.7    /  TBili  0.5    /  DBili  x      /  AST  21     /  ALT  15     /  AlkPhos  63     23 Nov 2023 07:03    PT/INR - ( 22 Nov 2023 13:35 )   PT: 23.6 sec;   INR: 2.12 ratio         PTT - ( 22 Nov 2023 13:35 )  PTT:37.9 sec  CAPILLARY BLOOD GLUCOSE      LIVER FUNCTIONS - ( 23 Nov 2023 07:03 )  Alb: 2.7 g/dL / Pro: 7.1 g/dL / ALK PHOS: 63 U/L / ALT: 15 U/L / AST: 21 U/L / GGT: x           Urinalysis Basic - ( 23 Nov 2023 07:03 )    Color: x / Appearance: x / SG: x / pH: x  Gluc: 124 mg/dL / Ketone: x  / Bili: x / Urobili: x   Blood: x / Protein: x / Nitrite: x   Leuk Esterase: x / RBC: x / WBC x   Sq Epi: x / Non Sq Epi: x / Bacteria: x             IMAGING:  All imaging personally reviewed.    No imaging to review     TTE Echo Limited or F/U (10.16.23 @ 12:43)   Summary:   1. Limited echocardiogram performed.   2. Low normal global left ventricular systolic function.   3. Left ventricular ejection fraction, by visual estimation, is 50%.   4. Normal right ventricular size and function.   5. Left atrial enlargement.   6. Right atrial enlargement.   7. Moderate thickening and calcification of the anterior and posterior   mitral valve leaflets.   8. Moderately decreased mitral leaflet mobility.   9. Moderate mitral valve stenosis (mean gradient    7 mmHg at HR 77 BPM, MVA 1.1 cm^2).  10. Mild tricuspid regurgitation.  11. Large pleural effusion in the left lateral region.  12. Small pericardial effusion.    Xray Chest 1 View AP/PA (11.22.23 @ 14:15)  IMPRESSION: Moderate to large left pleural effusion    CT Chest No Cont (11.22.23 @ 14:44)   IMPRESSION:  Cardiomegaly with large left pleural effusion as seen on the chest x-ray    MEDS:  MEDICATIONS  (STANDING):  allopurinol 100 milliGRAM(s) Oral daily  aspirin enteric coated 81 milliGRAM(s) Oral daily  atorvastatin 40 milliGRAM(s) Oral at bedtime  budesonide 160 MICROgram(s)/formoterol 4.5 MICROgram(s) Inhaler 2 Puff(s) Inhalation two times a day  furosemide   Injectable 40 milliGRAM(s) IV Push two times a day  hydrALAZINE 25 milliGRAM(s) Oral every 8 hours  metoprolol succinate ER 50 milliGRAM(s) Oral daily  multivitamin 1 Tablet(s) Oral daily  pantoprazole    Tablet 40 milliGRAM(s) Oral before breakfast  senna 2 Tablet(s) Oral at bedtime  tiotropium 2.5 MICROgram(s) Inhaler 2 Puff(s) Inhalation daily    MEDICATIONS  (PRN):  acetaminophen     Tablet .. 650 milliGRAM(s) Oral every 6 hours PRN Temp greater or equal to 38C (100.4F), Mild Pain (1 - 3)  albuterol    90 MICROgram(s) HFA Inhaler 2 Puff(s) Inhalation every 6 hours PRN Shortness of Breath and/or Wheezing  aluminum hydroxide/magnesium hydroxide/simethicone Suspension 30 milliLiter(s) Oral every 4 hours PRN Dyspepsia  melatonin 3 milliGRAM(s) Oral at bedtime PRN Insomnia  ondansetron Injectable 4 milliGRAM(s) IV Push every 8 hours PRN Nausea and/or Vomiting                         Patient is an 89 year-old male with a PMH of CHF, chronic Afib (not compliant with Eliquis), CKD stage 3, and COPD (smokes 3 cigars daily) who presented to the ED BIBA on 11/22/23 with worsening shortness of breath that he noticed the morning of admission and increased bilateral leg swelling over the past week. Was recently admitted in October 2023 for shortness of breath. Was found to have a pleural effusion. He was diuresed and patient left AMA.   He is currently admitted with acute hypoxic respiratory failure secondary to CHF exacerbation and large L sided pleural effusion.     Overnight Events: None  Interval HPI: Patient seen and examined at bedside. Patient states his fatigue is better than yesterday but is still present. Denies chest pain or palpitations, still short of breath, not on oxygen, dyspneic with conversation, satting 93% on RA. Dyspnea improved after thoracentesis yesterday. Patient denies any questions or concerns at this time. Called patient's daughter, Frida, to give update. All questions answered. No further questions or concerns at this time.     Vital Signs Last 24 Hrs  T(C): 36.6 (25 Nov 2023 04:49), Max: 36.6 (24 Nov 2023 12:08)  T(F): 97.9 (25 Nov 2023 04:49), Max: 97.9 (25 Nov 2023 04:49)  HR: 67 (25 Nov 2023 09:23) (67 - 74)  BP: 143/71 (25 Nov 2023 04:49) (122/64 - 143/71)  BP(mean): --  RR: 18 (25 Nov 2023 04:49) (18 - 18)  SpO2: 93% (25 Nov 2023 09:23) (93% - 96%)    Parameters below as of 25 Nov 2023 09:23  Patient On (Oxygen Delivery Method): room air      Physical Exam:  GENERAL: NAD, sitting up in chair   NERVOUS SYSTEM:  Alert & Oriented x 3  HEAD:  Atraumatic, Normocephalic  EYES: EOMI, conjunctiva clear and sclera white   NECK: Supple   CHEST/LUNG: improved air entry LLL, otherwise CTAB  HEART: irregular rate and rhythm   ABDOMEN: Soft, Nontender, Nondistended   EXTREMITIES:  2+ bilateral pre-tibial pitting edema, currently compression wrapped, pedal edema b/l    LABS:  Personally reviewed.                    10.7   10.09 )-----------( 170      ( 24 Nov 2023 06:55 )             33.7     23 Nov 2023 07:03    142    |  102    |  53     ----------------------------<  124    3.8     |  29     |  1.66     Ca    8.6        23 Nov 2023 07:03  Mg     2.4       23 Nov 2023 07:03    TPro  7.1    /  Alb  2.7    /  TBili  0.5    /  DBili  x      /  AST  21     /  ALT  15     /  AlkPhos  63     23 Nov 2023 07:03    PT/INR - ( 22 Nov 2023 13:35 )   PT: 23.6 sec;   INR: 2.12 ratio         PTT - ( 22 Nov 2023 13:35 )  PTT:37.9 sec  CAPILLARY BLOOD GLUCOSE      LIVER FUNCTIONS - ( 23 Nov 2023 07:03 )  Alb: 2.7 g/dL / Pro: 7.1 g/dL / ALK PHOS: 63 U/L / ALT: 15 U/L / AST: 21 U/L / GGT: x           Urinalysis Basic - ( 23 Nov 2023 07:03 )    Color: x / Appearance: x / SG: x / pH: x  Gluc: 124 mg/dL / Ketone: x  / Bili: x / Urobili: x   Blood: x / Protein: x / Nitrite: x   Leuk Esterase: x / RBC: x / WBC x   Sq Epi: x / Non Sq Epi: x / Bacteria: x             IMAGING:  All imaging personally reviewed.    No imaging to review     TTE Echo Limited or F/U (10.16.23 @ 12:43)   Summary:   1. Limited echocardiogram performed.   2. Low normal global left ventricular systolic function.   3. Left ventricular ejection fraction, by visual estimation, is 50%.   4. Normal right ventricular size and function.   5. Left atrial enlargement.   6. Right atrial enlargement.   7. Moderate thickening and calcification of the anterior and posterior   mitral valve leaflets.   8. Moderately decreased mitral leaflet mobility.   9. Moderate mitral valve stenosis (mean gradient    7 mmHg at HR 77 BPM, MVA 1.1 cm^2).  10. Mild tricuspid regurgitation.  11. Large pleural effusion in the left lateral region.  12. Small pericardial effusion.    Xray Chest 1 View AP/PA (11.22.23 @ 14:15)  IMPRESSION: Moderate to large left pleural effusion    CT Chest No Cont (11.22.23 @ 14:44)   IMPRESSION:  Cardiomegaly with large left pleural effusion as seen on the chest x-ray    MEDS:  MEDICATIONS  (STANDING):  allopurinol 100 milliGRAM(s) Oral daily  aspirin enteric coated 81 milliGRAM(s) Oral daily  atorvastatin 40 milliGRAM(s) Oral at bedtime  budesonide 160 MICROgram(s)/formoterol 4.5 MICROgram(s) Inhaler 2 Puff(s) Inhalation two times a day  furosemide   Injectable 40 milliGRAM(s) IV Push two times a day  hydrALAZINE 25 milliGRAM(s) Oral every 8 hours  metoprolol succinate ER 50 milliGRAM(s) Oral daily  multivitamin 1 Tablet(s) Oral daily  pantoprazole    Tablet 40 milliGRAM(s) Oral before breakfast  senna 2 Tablet(s) Oral at bedtime  tiotropium 2.5 MICROgram(s) Inhaler 2 Puff(s) Inhalation daily    MEDICATIONS  (PRN):  acetaminophen     Tablet .. 650 milliGRAM(s) Oral every 6 hours PRN Temp greater or equal to 38C (100.4F), Mild Pain (1 - 3)  albuterol    90 MICROgram(s) HFA Inhaler 2 Puff(s) Inhalation every 6 hours PRN Shortness of Breath and/or Wheezing  aluminum hydroxide/magnesium hydroxide/simethicone Suspension 30 milliLiter(s) Oral every 4 hours PRN Dyspepsia  melatonin 3 milliGRAM(s) Oral at bedtime PRN Insomnia  ondansetron Injectable 4 milliGRAM(s) IV Push every 8 hours PRN Nausea and/or Vomiting                         Patient is an 89 year-old male with a PMH of CHF, chronic Afib (not compliant with Eliquis), CKD stage 3, and COPD (smokes 3 cigars daily) who presented to the ED BIBA on 11/22/23 with worsening shortness of breath that he noticed the morning of admission and increased bilateral leg swelling over the past week. Was recently admitted in October 2023 for shortness of breath. Was found to have a pleural effusion. He was diuresed and patient left AMA.   He is currently admitted with acute hypoxic respiratory failure secondary to CHF exacerbation and large L sided pleural effusion.     Overnight Events: None  Interval HPI: Patient seen and examined at bedside. Patient states his fatigue is better than yesterday but is still present. Denies chest pain or palpitations, still short of breath, not on oxygen, dyspneic with conversation, satting 93% on RA. Dyspnea improved after thoracentesis yesterday. Patient denies any questions or concerns at this time. Called patient's daughter, Frida, to give update. All questions answered. No further questions or concerns at this time.     Vital Signs Last 24 Hrs  T(C): 36.6 (25 Nov 2023 04:49), Max: 36.6 (24 Nov 2023 12:08)  T(F): 97.9 (25 Nov 2023 04:49), Max: 97.9 (25 Nov 2023 04:49)  HR: 67 (25 Nov 2023 09:23) (67 - 74)  BP: 143/71 (25 Nov 2023 04:49) (122/64 - 143/71)  BP(mean): --  RR: 18 (25 Nov 2023 04:49) (18 - 18)  SpO2: 93% (25 Nov 2023 09:23) (93% - 96%)    Parameters below as of 25 Nov 2023 09:23  Patient On (Oxygen Delivery Method): room air      Physical Exam:  GENERAL: NAD, sitting up in chair, elder  NERVOUS SYSTEM:  Alert & Oriented x 3  HEAD:  Atraumatic, Normocephalic  EYES: EOMI, conjunctiva clear and sclera white   NECK: Supple   CHEST/LUNG: improved air entry LLL, otherwise CTAB  HEART: irregular rate and rhythm   ABDOMEN: Soft, Nontender, Nondistended   EXTREMITIES:  2+ bilateral pre-tibial pitting edema, currently compression wrapped, pedal edema b/l    LABS:  Personally reviewed.                    10.7   10.09 )-----------( 170      ( 24 Nov 2023 06:55 )             33.7     23 Nov 2023 07:03    142    |  102    |  53     ----------------------------<  124    3.8     |  29     |  1.66     Ca    8.6        23 Nov 2023 07:03  Mg     2.4       23 Nov 2023 07:03    TPro  7.1    /  Alb  2.7    /  TBili  0.5    /  DBili  x      /  AST  21     /  ALT  15     /  AlkPhos  63     23 Nov 2023 07:03    PT/INR - ( 22 Nov 2023 13:35 )   PT: 23.6 sec;   INR: 2.12 ratio         PTT - ( 22 Nov 2023 13:35 )  PTT:37.9 sec  CAPILLARY BLOOD GLUCOSE      LIVER FUNCTIONS - ( 23 Nov 2023 07:03 )  Alb: 2.7 g/dL / Pro: 7.1 g/dL / ALK PHOS: 63 U/L / ALT: 15 U/L / AST: 21 U/L / GGT: x           Urinalysis Basic - ( 23 Nov 2023 07:03 )    Color: x / Appearance: x / SG: x / pH: x  Gluc: 124 mg/dL / Ketone: x  / Bili: x / Urobili: x   Blood: x / Protein: x / Nitrite: x   Leuk Esterase: x / RBC: x / WBC x   Sq Epi: x / Non Sq Epi: x / Bacteria: x             IMAGING:  All imaging personally reviewed.    No imaging to review     TTE Echo Limited or F/U (10.16.23 @ 12:43)   Summary:   1. Limited echocardiogram performed.   2. Low normal global left ventricular systolic function.   3. Left ventricular ejection fraction, by visual estimation, is 50%.   4. Normal right ventricular size and function.   5. Left atrial enlargement.   6. Right atrial enlargement.   7. Moderate thickening and calcification of the anterior and posterior   mitral valve leaflets.   8. Moderately decreased mitral leaflet mobility.   9. Moderate mitral valve stenosis (mean gradient    7 mmHg at HR 77 BPM, MVA 1.1 cm^2).  10. Mild tricuspid regurgitation.  11. Large pleural effusion in the left lateral region.  12. Small pericardial effusion.    Xray Chest 1 View AP/PA (11.22.23 @ 14:15)  IMPRESSION: Moderate to large left pleural effusion    CT Chest No Cont (11.22.23 @ 14:44)   IMPRESSION:  Cardiomegaly with large left pleural effusion as seen on the chest x-ray    MEDS:  MEDICATIONS  (STANDING):  allopurinol 100 milliGRAM(s) Oral daily  aspirin enteric coated 81 milliGRAM(s) Oral daily  atorvastatin 40 milliGRAM(s) Oral at bedtime  budesonide 160 MICROgram(s)/formoterol 4.5 MICROgram(s) Inhaler 2 Puff(s) Inhalation two times a day  furosemide   Injectable 40 milliGRAM(s) IV Push two times a day  hydrALAZINE 25 milliGRAM(s) Oral every 8 hours  metoprolol succinate ER 50 milliGRAM(s) Oral daily  multivitamin 1 Tablet(s) Oral daily  pantoprazole    Tablet 40 milliGRAM(s) Oral before breakfast  senna 2 Tablet(s) Oral at bedtime  tiotropium 2.5 MICROgram(s) Inhaler 2 Puff(s) Inhalation daily    MEDICATIONS  (PRN):  acetaminophen     Tablet .. 650 milliGRAM(s) Oral every 6 hours PRN Temp greater or equal to 38C (100.4F), Mild Pain (1 - 3)  albuterol    90 MICROgram(s) HFA Inhaler 2 Puff(s) Inhalation every 6 hours PRN Shortness of Breath and/or Wheezing  aluminum hydroxide/magnesium hydroxide/simethicone Suspension 30 milliLiter(s) Oral every 4 hours PRN Dyspepsia  melatonin 3 milliGRAM(s) Oral at bedtime PRN Insomnia  ondansetron Injectable 4 milliGRAM(s) IV Push every 8 hours PRN Nausea and/or Vomiting

## 2023-11-25 NOTE — PROGRESS NOTE ADULT - SUBJECTIVE AND OBJECTIVE BOX
Follow-up Pulmonary Progress Note  Chief Complaint : Heart failure          No new events overnight.  Denies SOB/CP.       Allergies :No Known Allergies      PAST MEDICAL & SURGICAL HISTORY:  Afib    Congestive heart failure (CHF)    CVA (cerebral vascular accident)    Hypertension, unspecified type    Chronic obstructive pulmonary disease (COPD)    No significant past surgical history        Medications:  MEDICATIONS  (STANDING):  allopurinol 100 milliGRAM(s) Oral daily  aspirin enteric coated 81 milliGRAM(s) Oral daily  atorvastatin 40 milliGRAM(s) Oral at bedtime  budesonide 160 MICROgram(s)/formoterol 4.5 MICROgram(s) Inhaler 2 Puff(s) Inhalation two times a day  furosemide   Injectable 40 milliGRAM(s) IV Push two times a day  hydrALAZINE 25 milliGRAM(s) Oral every 8 hours  metolazone 5 milliGRAM(s) Oral daily  metoprolol succinate ER 50 milliGRAM(s) Oral daily  multivitamin 1 Tablet(s) Oral daily  pantoprazole    Tablet 40 milliGRAM(s) Oral before breakfast  senna 2 Tablet(s) Oral at bedtime  tiotropium 2.5 MICROgram(s) Inhaler 2 Puff(s) Inhalation daily    MEDICATIONS  (PRN):  acetaminophen     Tablet .. 650 milliGRAM(s) Oral every 6 hours PRN Temp greater or equal to 38C (100.4F), Mild Pain (1 - 3)  albuterol    90 MICROgram(s) HFA Inhaler 2 Puff(s) Inhalation every 6 hours PRN Shortness of Breath and/or Wheezing  aluminum hydroxide/magnesium hydroxide/simethicone Suspension 30 milliLiter(s) Oral every 4 hours PRN Dyspepsia  melatonin 3 milliGRAM(s) Oral at bedtime PRN Insomnia  ondansetron Injectable 4 milliGRAM(s) IV Push every 8 hours PRN Nausea and/or Vomiting      Antibiotics History      Heme Medications   aspirin enteric coated 81 milliGRAM(s) Oral daily, 11-22-23 @ 16:10      GI Medications  aluminum hydroxide/magnesium hydroxide/simethicone Suspension 30 milliLiter(s) Oral every 4 hours, 11-22-23 @ 15:59, Routine PRN  pantoprazole    Tablet 40 milliGRAM(s) Oral before breakfast, 11-22-23 @ 16:11, Routine  senna 2 Tablet(s) Oral at bedtime, 11-23-23 @ 14:03, Routine        LABS:                        10.7   8.60  )-----------( 162      ( 25 Nov 2023 05:45 )             33.9     11-25    144  |  104  |  52<H>  ----------------------------<  102<H>  3.8   |  31  |  1.71<H>    Ca    8.7      25 Nov 2023 05:45  Phos  4.3     11-25  Mg     2.4     11-25      Fluid characteristics  -- 11-24 @ 11:59  pH 7.6    tprot 4.1    Cell count  Appearance Bloody  Fluid type --  BF lymph 49  color Red  eosinophil --  PMN --  Mesothelial 1  Monocyte 39  Other body cells --   Lights Criteria :    Trend LDH- Fluid  11-24-23 @ 11:59  126 -- --      Trend LDH - Serum    _____________________  Trend Protein - Fluid   11-24-23 @ 11:59  4.1  --  --      Trend Protein - Serum  11-23-23 @ 07:03  7.1 [6.0 - 8.3]  11-22-23 @ 13:35  7.3 [6.0 - 8.3]  10-16-23 @ 07:34  7.9 [6.0 - 8.3]  10-15-23 @ 22:30  8.2 [6.0 - 8.3]  09-18-23 @ 12:07  7.6 [6.0 - 8.3]  01-12-23 @ 06:00  7.3 [6.0 - 8.3]  01-09-23 @ 06:12  6.6 [6.0 - 8.3]  01-05-23 @ 06:35  6.6 [6.0 - 8.3]  01-04-23 @ 07:05  6.9 [6.0 - 8.3]  01-02-23 @ 07:48  6.8 [6.0 - 8.3]  12-31-22 @ 07:20  6.3 [6.0 - 8.3]  12-20-22 @ 05:50  6.8 [6.0 - 8.3]      _____________________Exudate.   The effusion is exudative if one of the Light’s criteria has been met:   1)  Pleural fluid protein/serum protein ratio > 0.5.   2)  Pleural fluid LDH/serum LDH ratio greater than > 0.6.   3) Pleural fluid LDH level greater than 2/3 of upper limit of normal LDH level in serum.  _____________________                CULTURES: (if applicable)    Culture - Fungal, Body Fluid (collected 11-24-23 @ 11:59)  Source: Pleural Fl Pleural Fluid  Preliminary Report (11-25-23 @ 10:08):    Testing in progress    Culture - Body Fluid with Gram Stain (collected 11-24-23 @ 11:59)  Source: Pleural Fl Pleural Fluid  Gram Stain (11-25-23 @ 00:43):    No polymorphonuclear cells seen per low power field    No organisms seen per oil power field      Rapid RVP Result: NotDetec (11-22-23 @ 13:35)        CAPILLARY BLOOD GLUCOSE          RADIOLOGY  CXR:      CT:    ECHO:      VITALS:  T(C): 36.6 (11-25-23 @ 04:49), Max: 36.6 (11-24-23 @ 12:08)  T(F): 97.9 (11-25-23 @ 04:49), Max: 97.9 (11-25-23 @ 04:49)  HR: 67 (11-25-23 @ 09:23) (67 - 74)  BP: 143/71 (11-25-23 @ 04:49) (122/64 - 143/71)  BP(mean): --  ABP: --  ABP(mean): --  RR: 18 (11-25-23 @ 04:49) (18 - 18)  SpO2: 93% (11-25-23 @ 09:23) (93% - 96%)  CVP(mm Hg): --  CVP(cm H2O): --    Ins and Outs     11-24-23 @ 07:01  -  11-25-23 @ 07:00  --------------------------------------------------------  IN: 0 mL / OUT: 400 mL / NET: -400 mL        Height (cm): 167.6 (11-22-23 @ 17:35)  Weight (kg): 75.75 (11-25-23 @ 04:49)  BMI (kg/m2): 27 (11-25-23 @ 04:49)        I&O's Detail    24 Nov 2023 07:01  -  25 Nov 2023 07:00  --------------------------------------------------------  IN:  Total IN: 0 mL    OUT:    Voided (mL): 400 mL  Total OUT: 400 mL    Total NET: -400 mL          Follow-up Pulmonary Progress Note  Chief Complaint : Heart failure    No new events overnight.  Denies SOB/CP.   comfortable      Allergies :No Known Allergies      PAST MEDICAL & SURGICAL HISTORY:  Afib    Congestive heart failure (CHF)    CVA (cerebral vascular accident)    Hypertension, unspecified type    Chronic obstructive pulmonary disease (COPD)    No significant past surgical history        Medications:  MEDICATIONS  (STANDING):  allopurinol 100 milliGRAM(s) Oral daily  aspirin enteric coated 81 milliGRAM(s) Oral daily  atorvastatin 40 milliGRAM(s) Oral at bedtime  budesonide 160 MICROgram(s)/formoterol 4.5 MICROgram(s) Inhaler 2 Puff(s) Inhalation two times a day  furosemide   Injectable 40 milliGRAM(s) IV Push two times a day  hydrALAZINE 25 milliGRAM(s) Oral every 8 hours  metolazone 5 milliGRAM(s) Oral daily  metoprolol succinate ER 50 milliGRAM(s) Oral daily  multivitamin 1 Tablet(s) Oral daily  pantoprazole    Tablet 40 milliGRAM(s) Oral before breakfast  senna 2 Tablet(s) Oral at bedtime  tiotropium 2.5 MICROgram(s) Inhaler 2 Puff(s) Inhalation daily    MEDICATIONS  (PRN):  acetaminophen     Tablet .. 650 milliGRAM(s) Oral every 6 hours PRN Temp greater or equal to 38C (100.4F), Mild Pain (1 - 3)  albuterol    90 MICROgram(s) HFA Inhaler 2 Puff(s) Inhalation every 6 hours PRN Shortness of Breath and/or Wheezing  aluminum hydroxide/magnesium hydroxide/simethicone Suspension 30 milliLiter(s) Oral every 4 hours PRN Dyspepsia  melatonin 3 milliGRAM(s) Oral at bedtime PRN Insomnia  ondansetron Injectable 4 milliGRAM(s) IV Push every 8 hours PRN Nausea and/or Vomiting      Antibiotics History      Heme Medications   aspirin enteric coated 81 milliGRAM(s) Oral daily, 11-22-23 @ 16:10      GI Medications  aluminum hydroxide/magnesium hydroxide/simethicone Suspension 30 milliLiter(s) Oral every 4 hours, 11-22-23 @ 15:59, Routine PRN  pantoprazole    Tablet 40 milliGRAM(s) Oral before breakfast, 11-22-23 @ 16:11, Routine  senna 2 Tablet(s) Oral at bedtime, 11-23-23 @ 14:03, Routine        LABS:                        10.7   8.60  )-----------( 162      ( 25 Nov 2023 05:45 )             33.9     11-25    144  |  104  |  52<H>  ----------------------------<  102<H>  3.8   |  31  |  1.71<H>    Ca    8.7      25 Nov 2023 05:45  Phos  4.3     11-25  Mg     2.4     11-25      Fluid characteristics  -- 11-24 @ 11:59  pH 7.6    tprot 4.1    Cell count  Appearance Bloody  Fluid type --  BF lymph 49  color Red  eosinophil --  PMN --  Mesothelial 1  Monocyte 39  Other body cells --   Lights Criteria :    Trend LDH- Fluid  11-24-23 @ 11:59  126 -- --      Trend LDH - Serum    _____________________  Trend Protein - Fluid   11-24-23 @ 11:59  4.1  --  --      Trend Protein - Serum  11-23-23 @ 07:03  7.1 [6.0 - 8.3]  11-22-23 @ 13:35  7.3 [6.0 - 8.3]  10-16-23 @ 07:34  7.9 [6.0 - 8.3]  10-15-23 @ 22:30  8.2 [6.0 - 8.3]  09-18-23 @ 12:07  7.6 [6.0 - 8.3]  01-12-23 @ 06:00  7.3 [6.0 - 8.3]  01-09-23 @ 06:12  6.6 [6.0 - 8.3]  01-05-23 @ 06:35  6.6 [6.0 - 8.3]  01-04-23 @ 07:05  6.9 [6.0 - 8.3]  01-02-23 @ 07:48  6.8 [6.0 - 8.3]  12-31-22 @ 07:20  6.3 [6.0 - 8.3]  12-20-22 @ 05:50  6.8 [6.0 - 8.3]      _____________________Exudate.   The effusion is exudative if one of the Light’s criteria has been met:   1)  Pleural fluid protein/serum protein ratio > 0.5.   2)  Pleural fluid LDH/serum LDH ratio greater than > 0.6.   3) Pleural fluid LDH level greater than 2/3 of upper limit of normal LDH level in serum.  _____________________       CULTURES: (if applicable)    Culture - Fungal, Body Fluid (collected 11-24-23 @ 11:59)  Source: Pleural Fl Pleural Fluid  Preliminary Report (11-25-23 @ 10:08):    Testing in progress    Culture - Body Fluid with Gram Stain (collected 11-24-23 @ 11:59)  Source: Pleural Fl Pleural Fluid  Gram Stain (11-25-23 @ 00:43):    No polymorphonuclear cells seen per low power field    No organisms seen per oil power field      Rapid RVP Result: NotDetec (11-22-23 @ 13:35)       CXR grossly unchanged  continued Left effusion       VITALS:  T(C): 36.6 (11-25-23 @ 04:49), Max: 36.6 (11-24-23 @ 12:08)  T(F): 97.9 (11-25-23 @ 04:49), Max: 97.9 (11-25-23 @ 04:49)  HR: 67 (11-25-23 @ 09:23) (67 - 74)  BP: 143/71 (11-25-23 @ 04:49) (122/64 - 143/71)  BP(mean): --  ABP: --  ABP(mean): --  RR: 18 (11-25-23 @ 04:49) (18 - 18)  SpO2: 93% (11-25-23 @ 09:23) (93% - 96%)  CVP(mm Hg): --  CVP(cm H2O): --    Ins and Outs     11-24-23 @ 07:01  -  11-25-23 @ 07:00  --------------------------------------------------------  IN: 0 mL / OUT: 400 mL / NET: -400 mL        Height (cm): 167.6 (11-22-23 @ 17:35)  Weight (kg): 75.75 (11-25-23 @ 04:49)  BMI (kg/m2): 27 (11-25-23 @ 04:49)        I&O's Detail    24 Nov 2023 07:01  -  25 Nov 2023 07:00  --------------------------------------------------------  IN:  Total IN: 0 mL    OUT:    Voided (mL): 400 mL  Total OUT: 400 mL    Total NET: -400 mL

## 2023-11-25 NOTE — PROGRESS NOTE ADULT - PROBLEM SELECTOR PLAN 8
CODE: reaffirmed code status today with patient and daughter at bedside, FULL  HCP: daughter, Frida Dowlingstephon

## 2023-11-25 NOTE — PROGRESS NOTE ADULT - PROBLEM SELECTOR PLAN 7
DVT ppx:  held Lovenox 70 mg SubQ q12, due to bloody effusion on thoracentesis, SCDs pending b/l LE dopplers

## 2023-11-25 NOTE — PROGRESS NOTE ADULT - ASSESSMENT
Physical Examination:  GENERAL:               Alert, Oriented, No acute distress.    HEENT:                    No JVD, dry MM  PULM:                     Bilateral air entry, diminished to auscultation bilaterally L>R, no significant sputum production, No Rales, No Rhonchi, No Wheezing  CVS:                         S1, S2,  +Murmur  ABD:                        Soft, nondistended, nontender, normoactive bowel sounds,   EXT:                         + edema, nontender, No Cyanosis or Clubbing   Vascular:                Warm Extremities,    NEURO:                  Alert, oriented, interactive, nonfocal, follows commands  PSYC:                      Calm, + Insight.        Assessment  Dyspnea suspect due to acute on chronic Diastolic CHF  Chronic Left pleural effusions  s/p thoracentesis 11/24/23    Afib on a/c  Cigar smoker, at risk for copd.   CKD    Plan  CBC stable, cxr stable  incision site intact and soft  can restart a/c  f/u cytology results    monitor on room air  out of bed as tolerated

## 2023-11-26 NOTE — PROGRESS NOTE ADULT - SUBJECTIVE AND OBJECTIVE BOX
Follow-up Pulmonary Progress Note  Chief Complaint : Heart failure      patient seen and examined  comfortable  States HER and going to bed and laying flat  slightly better not at baseline  states watns to go home        Allergies :No Known Allergies      PAST MEDICAL & SURGICAL HISTORY:  Afib    Congestive heart failure (CHF)    CVA (cerebral vascular accident)    Hypertension, unspecified type    Chronic obstructive pulmonary disease (COPD)    No significant past surgical history        Medications:  MEDICATIONS  (STANDING):  allopurinol 100 milliGRAM(s) Oral daily  apixaban 2.5 milliGRAM(s) Oral every 12 hours  aspirin enteric coated 81 milliGRAM(s) Oral daily  atorvastatin 40 milliGRAM(s) Oral at bedtime  budesonide 160 MICROgram(s)/formoterol 4.5 MICROgram(s) Inhaler 2 Puff(s) Inhalation two times a day  furosemide    Tablet 40 milliGRAM(s) Oral two times a day  hydrALAZINE 25 milliGRAM(s) Oral every 8 hours  metolazone 5 milliGRAM(s) Oral daily  metoprolol succinate ER 50 milliGRAM(s) Oral daily  multivitamin 1 Tablet(s) Oral daily  pantoprazole    Tablet 40 milliGRAM(s) Oral before breakfast  senna 2 Tablet(s) Oral at bedtime  tiotropium 2.5 MICROgram(s) Inhaler 2 Puff(s) Inhalation daily    MEDICATIONS  (PRN):  acetaminophen     Tablet .. 650 milliGRAM(s) Oral every 6 hours PRN Temp greater or equal to 38C (100.4F), Mild Pain (1 - 3)  albuterol    90 MICROgram(s) HFA Inhaler 2 Puff(s) Inhalation every 6 hours PRN Shortness of Breath and/or Wheezing  aluminum hydroxide/magnesium hydroxide/simethicone Suspension 30 milliLiter(s) Oral every 4 hours PRN Dyspepsia  melatonin 3 milliGRAM(s) Oral at bedtime PRN Insomnia  ondansetron Injectable 4 milliGRAM(s) IV Push every 8 hours PRN Nausea and/or Vomiting      Antibiotics History      Heme Medications   apixaban 2.5 milliGRAM(s) Oral every 12 hours, 11-25-23 @ 13:26  aspirin enteric coated 81 milliGRAM(s) Oral daily, 11-22-23 @ 16:10      GI Medications  aluminum hydroxide/magnesium hydroxide/simethicone Suspension 30 milliLiter(s) Oral every 4 hours, 11-22-23 @ 15:59, Routine PRN  pantoprazole    Tablet 40 milliGRAM(s) Oral before breakfast, 11-22-23 @ 16:11, Routine  senna 2 Tablet(s) Oral at bedtime, 11-23-23 @ 14:03, Routine        LABS:                        10.5   7.67  )-----------( 160      ( 26 Nov 2023 07:00 )             33.6     11-26    143  |  103  |  50<H>  ----------------------------<  104<H>  3.7   |  30  |  1.65<H>    Ca    8.7      26 Nov 2023 07:00  Phos  4.0     11-26  Mg     2.3     11-26      Fluid characteristics  -- 11-24 @ 11:59  pH 7.6    tprot 4.1    Cell count  Appearance Bloody  Fluid type --  BF lymph 49  color Red  eosinophil --  PMN --  Mesothelial 1  Monocyte 39  Other body cells --            Urinalysis Basic - ( 26 Nov 2023 07:00 )    Color: x / Appearance: x / SG: x / pH: x  Gluc: 104 mg/dL / Ketone: x  / Bili: x / Urobili: x   Blood: x / Protein: x / Nitrite: x   Leuk Esterase: x / RBC: x / WBC x   Sq Epi: x / Non Sq Epi: x / Bacteria: x              CULTURES: (if applicable)    Culture - Fungal, Body Fluid (collected 11-24-23 @ 11:59)  Source: Pleural Fl Pleural Fluid  Preliminary Report (11-25-23 @ 10:08):    Testing in progress    Culture - Body Fluid with Gram Stain (collected 11-24-23 @ 11:59)  Source: Pleural Fl Pleural Fluid  Gram Stain (11-25-23 @ 00:43):    No polymorphonuclear cells seen per low power field    No organisms seen per oil power field  Preliminary Report (11-25-23 @ 15:15):    No growth      Rapid RVP Result: NotDetec (11-22-23 @ 13:35)           VITALS:  T(C): 36.7 (11-26-23 @ 05:14), Max: 36.7 (11-25-23 @ 20:23)  T(F): 98.1 (11-26-23 @ 05:14), Max: 98.1 (11-26-23 @ 05:14)  HR: 69 (11-26-23 @ 09:04) (69 - 71)  BP: 138/72 (11-26-23 @ 05:14) (138/72 - 155/60)  BP(mean): --  ABP: --  ABP(mean): --  RR: 17 (11-26-23 @ 05:14) (17 - 18)  SpO2: 95% (11-26-23 @ 09:04) (95% - 96%)  CVP(mm Hg): --  CVP(cm H2O): --    Ins and Outs     11-25-23 @ 07:01  -  11-26-23 @ 07:00  --------------------------------------------------------  IN: 200 mL / OUT: 400 mL / NET: -200 mL          Weight (kg): 75.75 (11-25-23 @ 04:49)        I&O's Detail    25 Nov 2023 07:01  -  26 Nov 2023 07:00  --------------------------------------------------------  IN:    Oral Fluid: 200 mL  Total IN: 200 mL    OUT:    Voided (mL): 400 mL  Total OUT: 400 mL    Total NET: -200 mL

## 2023-11-26 NOTE — PROGRESS NOTE ADULT - SUBJECTIVE AND OBJECTIVE BOX
Follow up for chf  SUBJ:    anxious for discharge no cp or sob "i live right down the street."    PMH  Afib    Congestive heart failure (CHF)    CVA (cerebral vascular accident)    Hypertension, unspecified type    Chronic obstructive pulmonary disease (COPD)        MEDICATIONS  (STANDING):  allopurinol 100 milliGRAM(s) Oral daily  apixaban 2.5 milliGRAM(s) Oral every 12 hours  aspirin enteric coated 81 milliGRAM(s) Oral daily  atorvastatin 40 milliGRAM(s) Oral at bedtime  budesonide 160 MICROgram(s)/formoterol 4.5 MICROgram(s) Inhaler 2 Puff(s) Inhalation two times a day  furosemide    Tablet 40 milliGRAM(s) Oral two times a day  hydrALAZINE 25 milliGRAM(s) Oral every 8 hours  metolazone 5 milliGRAM(s) Oral daily  metoprolol succinate ER 50 milliGRAM(s) Oral daily  multivitamin 1 Tablet(s) Oral daily  pantoprazole    Tablet 40 milliGRAM(s) Oral before breakfast  senna 2 Tablet(s) Oral at bedtime  tiotropium 2.5 MICROgram(s) Inhaler 2 Puff(s) Inhalation daily    MEDICATIONS  (PRN):  acetaminophen     Tablet .. 650 milliGRAM(s) Oral every 6 hours PRN Temp greater or equal to 38C (100.4F), Mild Pain (1 - 3)  albuterol    90 MICROgram(s) HFA Inhaler 2 Puff(s) Inhalation every 6 hours PRN Shortness of Breath and/or Wheezing  aluminum hydroxide/magnesium hydroxide/simethicone Suspension 30 milliLiter(s) Oral every 4 hours PRN Dyspepsia  melatonin 3 milliGRAM(s) Oral at bedtime PRN Insomnia  ondansetron Injectable 4 milliGRAM(s) IV Push every 8 hours PRN Nausea and/or Vomiting        PHYSICAL EXAM:  Vital Signs Last 24 Hrs  T(C): 36.6 (26 Nov 2023 14:51), Max: 36.7 (25 Nov 2023 20:23)  T(F): 97.9 (26 Nov 2023 14:51), Max: 98.1 (26 Nov 2023 05:14)  HR: 78 (26 Nov 2023 14:51) (69 - 78)  BP: 116/60 (26 Nov 2023 14:51) (116/60 - 155/60)  BP(mean): --  RR: 16 (26 Nov 2023 14:51) (16 - 18)  SpO2: 93% (26 Nov 2023 14:51) (93% - 96%)    Parameters below as of 26 Nov 2023 14:51  Patient On (Oxygen Delivery Method): room air        GENERAL: NAD, well-groomed, well-developed  HEAD:  Atraumatic, Normocephalic  EYES: EOMI, PERRLA, conjunctiva and sclera clear  ENT: Moist mucous membranes,  NECK: Supple, No JVD, no bruits  CHEST/LUNG: Clear to percussion bilaterally; No rales, rhonchi, wheezing, or rubs  HEART: Regular rate and rhythm; No murmurs, rubs, or gallops PMI non displaced.  ABDOMEN: Soft, Nontender, Nondistended; Bowel sounds present  EXTREMITIES:  2+ Peripheral Pulses, No clubbing, cyanosis, or edema  SKIN: No rashes or lesions  NERVOUS SYSTEM:  Cranial Nerves II-XII intact      TELEMETRY:    ECG:    < from: 12 Lead ECG (11.22.23 @ 13:01) >  Diagnosis Line Atrial fibrillation with premature ventricular or aberrantly conducted complexes  ST and T wave abnormality, consider inferolateral ischemia  Abnormal ECG  When compared with ECG of 15-OCT-2023 22:00,  T wave inversion now evident in Lateral leads  Confirmed by HEBERT REYES MD ([id number]) on 11/23/2023 9:29:36 AM    < end of copied text >    ECHO:    < from: TTE Echo Limited or F/U (10.16.23 @ 12:43) >  Summary:   1. Limited echocardiogram performed.   2. Low normal global left ventricular systolic function.   3. Left ventricular ejection fraction, by visual estimation, is 50%.   4. Normal right ventricular size and function.   5. Left atrial enlargement.   6. Right atrial enlargement.   7. Moderate thickening and calcification of the anterior and posterior   mitral valve leaflets.   8. Moderately decreased mitral leaflet mobility.   9. Moderate mitral valve stenosis (mean gradient    7 mmHg at HR 77 BPM, MVA 1.1 cm^2).  10. Mild tricuspid regurgitation.  11. Large pleural effusion in the left lateral region.  12. Small pericardial effusion.    [id number] Kevin Hong MD Electronically signed on   10/16/2023 at 1:47:28 PM        *** Final ***    < end of copied text >      LABS:                        10.5   7.67  )-----------( 160      ( 26 Nov 2023 07:00 )             33.6     11-26    143  |  103  |  50<H>  ----------------------------<  104<H>  3.7   |  30  |  1.65<H>    Ca    8.7      26 Nov 2023 07:00  Phos  4.0     11-26  Mg     2.3     11-26        I&O's Summary    25 Nov 2023 07:01  -  26 Nov 2023 07:00  --------------------------------------------------------  IN: 200 mL / OUT: 400 mL / NET: -200 mL      BNP    RADIOLOGY & ADDITIONAL STUDIES:    ECHO:    afib rate control anticoagulate  hb 10.5 hc 34 afib sees dr Rubio [facility] for cardiology no new cardiac recommendations

## 2023-11-26 NOTE — PROGRESS NOTE ADULT - ATTENDING COMMENTS
88 y/o M with a PMH of HFpEF, chronic Afib (not compliant with Eliquis), CKD stage 3, and COPD (smokes 3 cigars daily) who presented to the ED BIBA on 11/22/23 with worsening shortness of breath that he noticed the morning of admission and increased bilateral leg swelling over the past week. He is currently admitted with acute hypoxic respiratory failure secondary to acute on chronic HFpEF exacerbation, darren on ckd 3 and large L sided pleural effusion s/p 900ml thoracenteiss, bloody efffusion noted AC held Plan: cont diuresis, monitor volume status, apprec pulm and cardio recs and collaboration in care, iv diuresis, trend renal function, monitor i/os, daily weight, fluid and sodium restriction, trend probnp, complex care coordination, bloody effusion as per pulm, AC restarted, dc planning underway likely follow up as outpt with pulm for cx results and further care/managent, apprec PT eval

## 2023-11-26 NOTE — PROGRESS NOTE ADULT - PROBLEM SELECTOR PLAN 3
- Now an IRINEO on CKD  - Baseline average Cr ~ 1.55, eGFR ~ 43  - 1.65 today - was 1.71 yesterday   - Not on ACE/ARBS due to renal function   - Continue to monitor BUN/CR while on Lasix  - Strict I&Os, maintaining appropriate output >0.5mL/kg/hr  -if continues to uptrend will consult nephro

## 2023-11-26 NOTE — PROGRESS NOTE ADULT - SUBJECTIVE AND OBJECTIVE BOX
Patient is an 89 year-old male with a PMH of CHF, chronic Afib (not compliant with Eliquis), CKD stage 3, and COPD (smokes 3 cigars daily) who presented to the ED BIBA on 11/22/23 with worsening shortness of breath that he noticed the morning of admission and increased bilateral leg swelling over the past week. Was recently admitted in October 2023 for shortness of breath. Was found to have a pleural effusion. He was diuresed and patient left AMA.   He is currently admitted with acute hypoxic respiratory failure secondary to CHF exacerbation and large L sided pleural effusion.     Overnight Events: None  Interval HPI: Patient seen and examined at bedside. Noted to have improvement in bilateral leg swelling. Wants to go home.     Vital Signs Last 24 Hrs  Vital Signs Last 24 Hrs  T(C): 36.6 (26 Nov 2023 14:51), Max: 36.7 (25 Nov 2023 20:23)  T(F): 97.9 (26 Nov 2023 14:51), Max: 98.1 (26 Nov 2023 05:14)  HR: 78 (26 Nov 2023 14:51) (69 - 78)  BP: 116/60 (26 Nov 2023 14:51) (116/60 - 155/60)  RR: 16 (26 Nov 2023 14:51) (16 - 18)  SpO2: 93% (26 Nov 2023 14:51) (93% - 96%)    Parameters below as of 26 Nov 2023 14:51  Patient On (Oxygen Delivery Method): room air    Physical Exam:  GENERAL: NAD, sitting up in chair, elder  NERVOUS SYSTEM:  Alert & Oriented x 3  HEAD:  Atraumatic, Normocephalic  EYES: EOMI, conjunctiva clear and sclera white   NECK: Supple   CHEST/LUNG: improved air entry LLL, otherwise CTAB  HEART: irregular rate and rhythm   ABDOMEN: Soft, Nontender, Nondistended   EXTREMITIES:  2+ bilateral pre-tibial pitting edema, currently compression wrapped, pedal edema b/l    LABS:                        10.5   7.67  )-----------( 160      ( 26 Nov 2023 07:00 )             33.6     11-26    143  |  103  |  50<H>  ----------------------------<  104<H>  3.7   |  30  |  1.65<H>    Ca    8.7      26 Nov 2023 07:00  Phos  4.0     11-26  Mg     2.3     11-26      IMAGING:  All imaging personally reviewed.    No imaging to review     TTE Echo Limited or F/U (10.16.23 @ 12:43)   Summary:   1. Limited echocardiogram performed.   2. Low normal global left ventricular systolic function.   3. Left ventricular ejection fraction, by visual estimation, is 50%.   4. Normal right ventricular size and function.   5. Left atrial enlargement.   6. Right atrial enlargement.   7. Moderate thickening and calcification of the anterior and posterior   mitral valve leaflets.   8. Moderately decreased mitral leaflet mobility.  9. Moderate mitral valve stenosis (mean gradient    7 mmHg at HR 77 BPM, MVA 1.1 cm^2).  10. Mild tricuspid regurgitation.  11. Large pleural effusion in the left lateral region.  12. Small pericardial effusion.    Xray Chest 1 View AP/PA (11.22.23 @ 14:15)  IMPRESSION: Moderate to large left pleural effusion    CT Chest No Cont (11.22.23 @ 14:44)   IMPRESSION:  Cardiomegaly with large left pleural effusion as seen on the chest x-ray    Xray Chest 1 View- PORTABLE-Routine (Xray Chest 1 View- PORTABLE-Routine .) (11.24.23 @ 12:44) >  IMPRESSION: Marked decrease in left pleural effusion     Xray Chest 1 View- PORTABLE-Routine (Xray Chest 1 View- PORTABLE-Routine in AM.) (11.25.23 @ 09:04) >  IMPRESSION: Persistent advanced left base pleural pulmonary process.    US Duplex Venous Lower Ext Complete, Bilateral (11.25.23 @ 10:41) >  IMPRESSION:  No evidence of deep venous thrombosis in either lower extremity.    MEDS:  MEDICATIONS  (STANDING):  allopurinol 100 milliGRAM(s) Oral daily  apixaban 2.5 milliGRAM(s) Oral every 12 hours  aspirin enteric coated 81 milliGRAM(s) Oral daily  atorvastatin 40 milliGRAM(s) Oral at bedtime  budesonide 160 MICROgram(s)/formoterol 4.5 MICROgram(s) Inhaler 2 Puff(s) Inhalation two times a day  furosemide    Tablet 40 milliGRAM(s) Oral two times a day  hydrALAZINE 25 milliGRAM(s) Oral every 8 hours  metolazone 5 milliGRAM(s) Oral daily  metoprolol succinate ER 50 milliGRAM(s) Oral daily  multivitamin 1 Tablet(s) Oral daily  pantoprazole    Tablet 40 milliGRAM(s) Oral before breakfast  senna 2 Tablet(s) Oral at bedtime  tiotropium 2.5 MICROgram(s) Inhaler 2 Puff(s) Inhalation daily    MEDICATIONS  (PRN):  acetaminophen     Tablet .. 650 milliGRAM(s) Oral every 6 hours PRN Temp greater or equal to 38C (100.4F), Mild Pain (1 - 3)  albuterol    90 MICROgram(s) HFA Inhaler 2 Puff(s) Inhalation every 6 hours PRN Shortness of Breath and/or Wheezing  aluminum hydroxide/magnesium hydroxide/simethicone Suspension 30 milliLiter(s) Oral every 4 hours PRN Dyspepsia  melatonin 3 milliGRAM(s) Oral at bedtime PRN Insomnia  ondansetron Injectable 4 milliGRAM(s) IV Push every 8 hours PRN Nausea and/or Vomiting

## 2023-11-26 NOTE — PROGRESS NOTE ADULT - ASSESSMENT
Patient is an 89 y/o M with a PMH of HFpEF, chronic Afib (not compliant with Eliquis), CKD stage 3, and COPD (smokes 3 cigars daily) who presented to the ED BIBA on 11/22/23 with worsening shortness of breath that he noticed the morning of admission and increased bilateral leg swelling over the past week. He is currently admitted with acute hypoxic respiratory failure secondary to acute on chronic HFpEF exacerbation and large L sided pleural effusion. S/p thoracentesis 900ml on 11/24/23.     Discussed with attending, Dr. Licea

## 2023-11-26 NOTE — PROGRESS NOTE ADULT - PROBLEM SELECTOR PLAN 8
CODE: reaffirmed code status today with patient and daughter at bedside, FULL CODE  HCP: daughter, Frida Michaellynda

## 2023-11-26 NOTE — PROGRESS NOTE ADULT - PROBLEM SELECTOR PLAN 1
- EMS noted patient with SpO2 of 88% on room air   - Likely due to CHF exacerbation and pleural effusion as patient presenting with worsening SOB/increased leg swelling   - ECHO from 10/16/23: EF of 50% and left sided pleural effusion  - CXR from 11/22/23: moderate to large LEFT pleural effusion   - CT chest from 11/22/23: cardiomegaly with large left pleural effusion as seen on the chest x-ray  - Pro-BNP of 3788 upon admission, 3595 on 11/25/23  - Supplemental oxygen PRN, not currently using  - Strict I&Os, fluid restriction 1.2L, low sodium diet  - S/p IV Lasix 80 mg in the ED   - As of 11/26/23: will transition from IV Lasix to PO Lasix - continue 40 mg BID  - C/w metolazone 5mg QD   - C/w metoprolol 50 mg QD  - Appreciate cardio consult   - Appreciate pulmonology consult:  --> s/p thoracentesis on 11/24/23; pending results

## 2023-11-26 NOTE — PROGRESS NOTE ADULT - ASSESSMENT
Physical Examination:  GENERAL:               Alert, Oriented, No acute distress.    HEENT:                    No JVD, dry MM  PULM:                     Bilateral air entry, diminished to auscultation bilaterally L>R, no significant sputum production, No Rales, No Rhonchi, No Wheezing  CVS:                         S1, S2,  +Murmur  ABD:                        Soft, nondistended, nontender, normoactive bowel sounds,   EXT:                         + edema, nontender, No Cyanosis or Clubbing   NEURO:                  Alert, oriented, interactive, nonfocal, follows commands  PSYC:                      Calm, + Insight.        Assessment  Dyspnea suspect due to acute on chronic Diastolic CHF  Chronic Left pleural effusions  s/p thoracentesis 11/24/23    Afib on a/c  Cigar smoker, at risk for copd.   CKD    Plan  CBC stable, cxr stable  continue a/c  f/u cytology results    monitor on room air  out of bed as tolerated  optimize cardiac meds as per Cardio  Rest of care as per primary team.

## 2023-11-26 NOTE — PROGRESS NOTE ADULT - PROBLEM SELECTOR PLAN 2
- Chronic, stable  - C/w metoprolol 50 mg QD   - Given age and renal function was prescribed Eliquis 2.5 mg BID - patient is non adherent to this as an outpatient

## 2023-11-27 NOTE — PROGRESS NOTE ADULT - SUBJECTIVE AND OBJECTIVE BOX
Patient is an 89 year-old male with a PMH of CHF, chronic Afib (not compliant with Eliquis), CKD stage 3, and COPD (smokes 3 cigars daily) who presented to the ED BIBA on 11/22/23 with worsening shortness of breath that he noticed the morning of admission and increased bilateral leg swelling over the past week. Was recently admitted in October 2023 for shortness of breath. Was found to have a pleural effusion. He was diuresed and patient left AMA.   He is currently admitted with acute hypoxic respiratory failure secondary to CHF exacerbation and large L sided pleural effusion.     Overnight Events: None  Interval HPI: Patient seen and examined at bedside.     Vital Signs Last 24 Hrs  T(C): 36.2 (27 Nov 2023 05:22), Max: 36.7 (26 Nov 2023 20:29)  T(F): 97.1 (27 Nov 2023 05:22), Max: 98 (26 Nov 2023 20:29)  HR: 68 (27 Nov 2023 05:22) (68 - 78)  BP: 134/68 (27 Nov 2023 05:22) (116/60 - 134/68)  BP(mean): --  RR: 18 (27 Nov 2023 05:22) (16 - 18)  SpO2: 94% (27 Nov 2023 05:22) (93% - 95%)    Parameters below as of 27 Nov 2023 05:22  Patient On (Oxygen Delivery Method): room air        Physical Exam:  GENERAL: NAD, sitting up in chair, elder  NERVOUS SYSTEM:  Alert & Oriented x 3  HEAD:  Atraumatic, Normocephalic  EYES: EOMI, conjunctiva clear and sclera white   NECK: Supple   CHEST/LUNG: improved air entry LLL, otherwise CTAB  HEART: irregular rate and rhythm   ABDOMEN: Soft, Nontender, Nondistended   EXTREMITIES:  2+ bilateral pre-tibial pitting edema, currently compression wrapped, pedal edema b/l    LABS:                       10.5   7.67  )-----------( 160      ( 26 Nov 2023 07:00 )             33.6     26 Nov 2023 07:00    143    |  103    |  50     ----------------------------<  104    3.7     |  30     |  1.65     Ca    8.7        26 Nov 2023 07:00  Phos  4.0       26 Nov 2023 07:00  Mg     2.3       26 Nov 2023 07:00        CAPILLARY BLOOD GLUCOSE          Urinalysis Basic - ( 26 Nov 2023 07:00 )    Color: x / Appearance: x / SG: x / pH: x  Gluc: 104 mg/dL / Ketone: x  / Bili: x / Urobili: x   Blood: x / Protein: x / Nitrite: x   Leuk Esterase: x / RBC: x / WBC x   Sq Epi: x / Non Sq Epi: x / Bacteria: x        IMAGING:  All imaging personally reviewed.    No imaging to review     TTE Echo Limited or F/U (10.16.23 @ 12:43)   Summary:   1. Limited echocardiogram performed.   2. Low normal global left ventricular systolic function.   3. Left ventricular ejection fraction, by visual estimation, is 50%.   4. Normal right ventricular size and function.   5. Left atrial enlargement.   6. Right atrial enlargement.   7. Moderate thickening and calcification of the anterior and posterior   mitral valve leaflets.   8. Moderately decreased mitral leaflet mobility.  9. Moderate mitral valve stenosis (mean gradient    7 mmHg at HR 77 BPM, MVA 1.1 cm^2).  10. Mild tricuspid regurgitation.  11. Large pleural effusion in the left lateral region.  12. Small pericardial effusion.    Xray Chest 1 View AP/PA (11.22.23 @ 14:15)  IMPRESSION: Moderate to large left pleural effusion    CT Chest No Cont (11.22.23 @ 14:44)   IMPRESSION:  Cardiomegaly with large left pleural effusion as seen on the chest x-ray    Xray Chest 1 View- PORTABLE-Routine (Xray Chest 1 View- PORTABLE-Routine .) (11.24.23 @ 12:44) >  IMPRESSION: Marked decrease in left pleural effusion     Xray Chest 1 View- PORTABLE-Routine (Xray Chest 1 View- PORTABLE-Routine in AM.) (11.25.23 @ 09:04) >  IMPRESSION: Persistent advanced left base pleural pulmonary process.    US Duplex Venous Lower Ext Complete, Bilateral (11.25.23 @ 10:41) >  IMPRESSION:  No evidence of deep venous thrombosis in either lower extremity.    MEDS:  MEDICATIONS  (STANDING):  allopurinol 100 milliGRAM(s) Oral daily  apixaban 2.5 milliGRAM(s) Oral every 12 hours  aspirin enteric coated 81 milliGRAM(s) Oral daily  atorvastatin 40 milliGRAM(s) Oral at bedtime  budesonide 160 MICROgram(s)/formoterol 4.5 MICROgram(s) Inhaler 2 Puff(s) Inhalation two times a day  furosemide    Tablet 40 milliGRAM(s) Oral two times a day  hydrALAZINE 25 milliGRAM(s) Oral every 8 hours  metolazone 5 milliGRAM(s) Oral daily  metoprolol succinate ER 50 milliGRAM(s) Oral daily  multivitamin 1 Tablet(s) Oral daily  pantoprazole    Tablet 40 milliGRAM(s) Oral before breakfast  senna 2 Tablet(s) Oral at bedtime  tiotropium 2.5 MICROgram(s) Inhaler 2 Puff(s) Inhalation daily    MEDICATIONS  (PRN):  acetaminophen     Tablet .. 650 milliGRAM(s) Oral every 6 hours PRN Temp greater or equal to 38C (100.4F), Mild Pain (1 - 3)  albuterol    90 MICROgram(s) HFA Inhaler 2 Puff(s) Inhalation every 6 hours PRN Shortness of Breath and/or Wheezing  aluminum hydroxide/magnesium hydroxide/simethicone Suspension 30 milliLiter(s) Oral every 4 hours PRN Dyspepsia  melatonin 3 milliGRAM(s) Oral at bedtime PRN Insomnia  ondansetron Injectable 4 milliGRAM(s) IV Push every 8 hours PRN Nausea and/or Vomiting                         Patient is an 89 year-old male with a PMH of CHF, chronic Afib (not compliant with Eliquis), CKD stage 3, and COPD (smokes 3 cigars daily) who presented to the ED BIBA on 11/22/23 with worsening shortness of breath that he noticed the morning of admission and increased bilateral leg swelling over the past week. Was recently admitted in October 2023 for shortness of breath. Was found to have a pleural effusion. He was diuresed and patient left AMA.   He is currently admitted with acute hypoxic respiratory failure secondary to CHF exacerbation and large L sided pleural effusion s/p thoracentesis 11/24.     Overnight Events: None  Interval HPI: Patient seen and examined at bedside. Feeling better than his baseline, thinks his swelling is improved. Would like to go home, is not sleeping well here. Denies SOB worse than his baseline or chest pain. Patient tolerating diet and voiding appropriately.     Vital Signs Last 24 Hrs  T(C): 36.2 (27 Nov 2023 05:22), Max: 36.7 (26 Nov 2023 20:29)  T(F): 97.1 (27 Nov 2023 05:22), Max: 98 (26 Nov 2023 20:29)  HR: 68 (27 Nov 2023 05:22) (68 - 78)  BP: 134/68 (27 Nov 2023 05:22) (116/60 - 134/68)  BP(mean): --  RR: 18 (27 Nov 2023 05:22) (16 - 18)  SpO2: 94% (27 Nov 2023 05:22) (93% - 95%)    Parameters below as of 27 Nov 2023 05:22  Patient On (Oxygen Delivery Method): room air      Physical Exam:  GENERAL: NAD, sitting up in chair, elder  NERVOUS SYSTEM:  Alert & Oriented x 3  HEAD:  Atraumatic, Normocephalic  EYES: EOMI, conjunctiva clear and sclera white   NECK: Supple   CHEST/LUNG: Decreased breath sound LLL, CTAB otherwise w/o rales, rhonchi, wheezes  HEART: irregular rate and rhythm   ABDOMEN: Soft, Nontender, Nondistended   EXTREMITIES:  2+ bilateral pre-tibial pitting edema, currently compression wrapped, pedal edema b/l    LABS:  Personally reviewed.                   10.0   7.63  )-----------( 149      ( 27 Nov 2023 06:42 )             31.2     27 Nov 2023 06:42    140    |  99     |  50     ----------------------------<  114    3.2     |  31     |  1.73     Ca    8.5        27 Nov 2023 06:42  Phos  3.9       27 Nov 2023 06:42  Mg     2.3       27 Nov 2023 06:42    CAPILLARY BLOOD GLUCOSE    Urinalysis Basic - ( 27 Nov 2023 06:42 )    Color: x / Appearance: x / SG: x / pH: x  Gluc: 114 mg/dL / Ketone: x  / Bili: x / Urobili: x   Blood: x / Protein: x / Nitrite: x   Leuk Esterase: x / RBC: x / WBC x   Sq Epi: x / Non Sq Epi: x / Bacteria: x    IMAGING:  All imaging personally reviewed.    No imaging to review     TTE Echo Limited or F/U (10.16.23 @ 12:43)   Summary:   1. Limited echocardiogram performed.   2. Low normal global left ventricular systolic function.   3. Left ventricular ejection fraction, by visual estimation, is 50%.   4. Normal right ventricular size and function.   5. Left atrial enlargement.   6. Right atrial enlargement.   7. Moderate thickening and calcification of the anterior and posterior   mitral valve leaflets.   8. Moderately decreased mitral leaflet mobility.  9. Moderate mitral valve stenosis (mean gradient    7 mmHg at HR 77 BPM, MVA 1.1 cm^2).  10. Mild tricuspid regurgitation.  11. Large pleural effusion in the left lateral region.  12. Small pericardial effusion.    Xray Chest 1 View AP/PA (11.22.23 @ 14:15)  IMPRESSION: Moderate to large left pleural effusion    CT Chest No Cont (11.22.23 @ 14:44)   IMPRESSION:  Cardiomegaly with large left pleural effusion as seen on the chest x-ray    Xray Chest 1 View- PORTABLE-Routine (Xray Chest 1 View- PORTABLE-Routine .) (11.24.23 @ 12:44) >  IMPRESSION: Marked decrease in left pleural effusion     Xray Chest 1 View- PORTABLE-Routine (Xray Chest 1 View- PORTABLE-Routine in AM.) (11.25.23 @ 09:04) >  IMPRESSION: Persistent advanced left base pleural pulmonary process.    US Duplex Venous Lower Ext Complete, Bilateral (11.25.23 @ 10:41) >  IMPRESSION:  No evidence of deep venous thrombosis in either lower extremity.    MEDS:  MEDICATIONS  (STANDING):  allopurinol 100 milliGRAM(s) Oral daily  apixaban 2.5 milliGRAM(s) Oral every 12 hours  aspirin enteric coated 81 milliGRAM(s) Oral daily  atorvastatin 40 milliGRAM(s) Oral at bedtime  budesonide 160 MICROgram(s)/formoterol 4.5 MICROgram(s) Inhaler 2 Puff(s) Inhalation two times a day  furosemide    Tablet 40 milliGRAM(s) Oral two times a day  hydrALAZINE 25 milliGRAM(s) Oral every 8 hours  metolazone 5 milliGRAM(s) Oral daily  metoprolol succinate ER 50 milliGRAM(s) Oral daily  multivitamin 1 Tablet(s) Oral daily  pantoprazole    Tablet 40 milliGRAM(s) Oral before breakfast  senna 2 Tablet(s) Oral at bedtime  tiotropium 2.5 MICROgram(s) Inhaler 2 Puff(s) Inhalation daily    MEDICATIONS  (PRN):  acetaminophen     Tablet .. 650 milliGRAM(s) Oral every 6 hours PRN Temp greater or equal to 38C (100.4F), Mild Pain (1 - 3)  albuterol    90 MICROgram(s) HFA Inhaler 2 Puff(s) Inhalation every 6 hours PRN Shortness of Breath and/or Wheezing  aluminum hydroxide/magnesium hydroxide/simethicone Suspension 30 milliLiter(s) Oral every 4 hours PRN Dyspepsia  melatonin 3 milliGRAM(s) Oral at bedtime PRN Insomnia  ondansetron Injectable 4 milliGRAM(s) IV Push every 8 hours PRN Nausea and/or Vomiting                         Patient is an 89 year-old male with a PMH of CHF, chronic Afib (not compliant with Eliquis), CKD stage 3, and COPD (smokes 3 cigars daily) who presented to the ED BIBA on 11/22/23 with worsening shortness of breath that he noticed the morning of admission and increased bilateral leg swelling over the past week. Was recently admitted in October 2023 for shortness of breath. Was found to have a pleural effusion. He was diuresed and patient left AMA.   He is currently admitted with acute hypoxic respiratory failure secondary to CHF exacerbation and large L sided pleural effusion s/p thoracentesis 11/24.     Overnight Events: None  Interval HPI: Patient seen and examined at bedside. Feeling better than his baseline, thinks his swelling and breathing is improved. Would like to go home, is not sleeping well here. Denies SOB worse than his baseline or chest pain. Patient tolerating diet and voiding appropriately. Denies any questions or concerns at this time.     Vital Signs Last 24 Hrs  T(C): 36.2 (27 Nov 2023 05:22), Max: 36.7 (26 Nov 2023 20:29)  T(F): 97.1 (27 Nov 2023 05:22), Max: 98 (26 Nov 2023 20:29)  HR: 68 (27 Nov 2023 05:22) (68 - 78)  BP: 134/68 (27 Nov 2023 05:22) (116/60 - 134/68)  BP(mean): --  RR: 18 (27 Nov 2023 05:22) (16 - 18)  SpO2: 94% (27 Nov 2023 05:22) (93% - 95%)    Parameters below as of 27 Nov 2023 05:22  Patient On (Oxygen Delivery Method): room air      Physical Exam:  GENERAL: NAD, sitting up in chair, elder  NERVOUS SYSTEM:  Alert & Oriented x 3  HEAD:  Atraumatic, Normocephalic  EYES: EOMI, conjunctiva clear and sclera white   NECK: Supple   CHEST/LUNG: Decreased breath sound LLL, CTAB otherwise w/o rales, rhonchi, wheezes  HEART: irregular rate and rhythm   ABDOMEN: Soft, Nontender, Nondistended   EXTREMITIES:  2+ bilateral pre-tibial pitting edema, currently compression wrapped, pedal edema b/l    LABS:  Personally reviewed.                   10.0   7.63  )-----------( 149      ( 27 Nov 2023 06:42 )             31.2     27 Nov 2023 06:42    140    |  99     |  50     ----------------------------<  114    3.2     |  31     |  1.73     Ca    8.5        27 Nov 2023 06:42  Phos  3.9       27 Nov 2023 06:42  Mg     2.3       27 Nov 2023 06:42    CAPILLARY BLOOD GLUCOSE    Urinalysis Basic - ( 27 Nov 2023 06:42 )    Color: x / Appearance: x / SG: x / pH: x  Gluc: 114 mg/dL / Ketone: x  / Bili: x / Urobili: x   Blood: x / Protein: x / Nitrite: x   Leuk Esterase: x / RBC: x / WBC x   Sq Epi: x / Non Sq Epi: x / Bacteria: x    IMAGING:  All imaging personally reviewed.    No imaging to review     TTE Echo Limited or F/U (10.16.23 @ 12:43)   Summary:   1. Limited echocardiogram performed.   2. Low normal global left ventricular systolic function.   3. Left ventricular ejection fraction, by visual estimation, is 50%.   4. Normal right ventricular size and function.   5. Left atrial enlargement.   6. Right atrial enlargement.   7. Moderate thickening and calcification of the anterior and posterior   mitral valve leaflets.   8. Moderately decreased mitral leaflet mobility.  9. Moderate mitral valve stenosis (mean gradient    7 mmHg at HR 77 BPM, MVA 1.1 cm^2).  10. Mild tricuspid regurgitation.  11. Large pleural effusion in the left lateral region.  12. Small pericardial effusion.    Xray Chest 1 View AP/PA (11.22.23 @ 14:15)  IMPRESSION: Moderate to large left pleural effusion    CT Chest No Cont (11.22.23 @ 14:44)   IMPRESSION:  Cardiomegaly with large left pleural effusion as seen on the chest x-ray    Xray Chest 1 View- PORTABLE-Routine (Xray Chest 1 View- PORTABLE-Routine .) (11.24.23 @ 12:44) >  IMPRESSION: Marked decrease in left pleural effusion     Xray Chest 1 View- PORTABLE-Routine (Xray Chest 1 View- PORTABLE-Routine in AM.) (11.25.23 @ 09:04) >  IMPRESSION: Persistent advanced left base pleural pulmonary process.    US Duplex Venous Lower Ext Complete, Bilateral (11.25.23 @ 10:41) >  IMPRESSION:  No evidence of deep venous thrombosis in either lower extremity.    MEDS:  MEDICATIONS  (STANDING):  allopurinol 100 milliGRAM(s) Oral daily  apixaban 2.5 milliGRAM(s) Oral every 12 hours  aspirin enteric coated 81 milliGRAM(s) Oral daily  atorvastatin 40 milliGRAM(s) Oral at bedtime  budesonide 160 MICROgram(s)/formoterol 4.5 MICROgram(s) Inhaler 2 Puff(s) Inhalation two times a day  furosemide    Tablet 40 milliGRAM(s) Oral two times a day  hydrALAZINE 25 milliGRAM(s) Oral every 8 hours  metolazone 5 milliGRAM(s) Oral daily  metoprolol succinate ER 50 milliGRAM(s) Oral daily  multivitamin 1 Tablet(s) Oral daily  pantoprazole    Tablet 40 milliGRAM(s) Oral before breakfast  senna 2 Tablet(s) Oral at bedtime  tiotropium 2.5 MICROgram(s) Inhaler 2 Puff(s) Inhalation daily    MEDICATIONS  (PRN):  acetaminophen     Tablet .. 650 milliGRAM(s) Oral every 6 hours PRN Temp greater or equal to 38C (100.4F), Mild Pain (1 - 3)  albuterol    90 MICROgram(s) HFA Inhaler 2 Puff(s) Inhalation every 6 hours PRN Shortness of Breath and/or Wheezing  aluminum hydroxide/magnesium hydroxide/simethicone Suspension 30 milliLiter(s) Oral every 4 hours PRN Dyspepsia  melatonin 3 milliGRAM(s) Oral at bedtime PRN Insomnia  ondansetron Injectable 4 milliGRAM(s) IV Push every 8 hours PRN Nausea and/or Vomiting

## 2023-11-27 NOTE — PHYSICAL THERAPY INITIAL EVALUATION ADULT - PERTINENT HX OF CURRENT PROBLEM, REHAB EVAL
pt is a 89y year old Male with PMH of combined CHF (EF 45-50%), Chronic A-fib (eliquis), Chronic Kidney Disease 3, COPD  who presents to the ER complaining of shortness of breath.   Patient limited historian. States he has been compliant with his medications. Supplemental history obtained from Frida (daughter) who patient lives with. States patient still works, has had leg swelling for some time but more noticeable over the last week. This morning he noted worsening shortness of breath.

## 2023-11-27 NOTE — DISCHARGE NOTE PROVIDER - CARE PROVIDER_API CALL
Dewayne Ruibo  Cardiovascular Disease  [address] [address]  Phone: [phone]  Fax: [phone]  Established Patient  Follow Up Time: 1 week   Dewayne Ruibo  Cardiovascular Disease  [address] [facility] [address]  [place] [address]  Phone: [phone]  Fax: [phone]  Established Patient  Follow Up Time: 1 week    Chapo Hernandez  Critical Care Medicine  [address] [facility] [address]  [place] [address]  Phone: [phone]  Fax: [phone]  Established Patient  Follow Up Time: 1 week

## 2023-11-27 NOTE — PROGRESS NOTE ADULT - PROBLEM SELECTOR PLAN 6
- Chronic, stable  - C/w metoprolol 50 mg QD   - C/w hydralazine 25 mg TID - Stable; smokes 3 cigars daily   - Continue Symbicort  - Not interested in nicotine patch at this time

## 2023-11-27 NOTE — CHART NOTE - NSCHARTNOTEFT_GEN_A_CORE
Nutrition Follow Up Note  Hospital Course (Per Electronic Medical Record):   Source: Medical Record [X]  Nursing Staff [X]     Diet: DASH/TLC     Patient tolerating diet noted consuming % as per nursing flow sheet , Labs reviewed , Lasix therapy noted , received consult for diet education , patient instructed on (11/23), ? recorded weight  Patient noted stable for discharge , continue current diet ,     Current Weight: (11/25) 167.1/75.8kg                           (11/22) 163/73.9kg     Pertinent Medications: MEDICATIONS  (STANDING):  allopurinol 100 milliGRAM(s) Oral daily  apixaban 2.5 milliGRAM(s) Oral every 12 hours  aspirin enteric coated 81 milliGRAM(s) Oral daily  atorvastatin 40 milliGRAM(s) Oral at bedtime  budesonide 160 MICROgram(s)/formoterol 4.5 MICROgram(s) Inhaler 2 Puff(s) Inhalation two times a day  furosemide    Tablet 40 milliGRAM(s) Oral two times a day  hydrALAZINE 25 milliGRAM(s) Oral every 8 hours  metolazone 5 milliGRAM(s) Oral daily  metoprolol succinate ER 50 milliGRAM(s) Oral daily  multivitamin 1 Tablet(s) Oral daily  pantoprazole    Tablet 40 milliGRAM(s) Oral before breakfast  senna 2 Tablet(s) Oral at bedtime  tiotropium 2.5 MICROgram(s) Inhaler 2 Puff(s) Inhalation daily    MEDICATIONS  (PRN):  acetaminophen     Tablet .. 650 milliGRAM(s) Oral every 6 hours PRN Temp greater or equal to 38C (100.4F), Mild Pain (1 - 3)  albuterol    90 MICROgram(s) HFA Inhaler 2 Puff(s) Inhalation every 6 hours PRN Shortness of Breath and/or Wheezing  aluminum hydroxide/magnesium hydroxide/simethicone Suspension 30 milliLiter(s) Oral every 4 hours PRN Dyspepsia  melatonin 3 milliGRAM(s) Oral at bedtime PRN Insomnia  ondansetron Injectable 4 milliGRAM(s) IV Push every 8 hours PRN Nausea and/or Vomiting      Pertinent Labs:  11-27 Na140 mmol/L Glu 114 mg/dL<H> K+ 3.2 mmol/L<L> Cr  1.73 mg/dL<H> BUN 50 mg/dL<H> 11-27 Phos 3.9 mg/dL 11-23 Alb 2.7 g/dL<L>, Hgb 11.6g/dl<L> , Hct 34.8% <L>         Skin: intact    Edema: +(2) foot     Last BM: (11/25)     Estimated Needs:   [X] No Change since Previous Assessment      Previous Nutrition Diagnosis: Increased Nutrient Needs    Nutrition Diagnosis is [X] addressed          New Nutrition Diagnosis: [X] Not Applicable      Interventions:   1. continue current diet      Monitoring & Evaluation: will monitor:  [X] Weights   [X] PO Intake   [X] Follow Up (Per Protocol)  [X] Tolerance to Diet Prescription       RD to follow as per Nutrition protocol  Lilia Valle, RD,CDN

## 2023-11-27 NOTE — PROGRESS NOTE ADULT - SUBJECTIVE AND OBJECTIVE BOX
RAD SINGLETON  [id number]      Chief Complaint: Acute on chronic diastolic heart failure exacerbation/ Left pleural effusion/ Noncompliance     Interval History: The patient reports breathing is at baseline today. Denies chest discomfort. Has chronic leg swelling.     Tele: atrial fibrillation 60s BPM, NSVT      Current meds:   acetaminophen     Tablet .. 650 milliGRAM(s) Oral every 6 hours PRN  albuterol    90 MICROgram(s) HFA Inhaler 2 Puff(s) Inhalation every 6 hours PRN  allopurinol 100 milliGRAM(s) Oral daily  aluminum hydroxide/magnesium hydroxide/simethicone Suspension 30 milliLiter(s) Oral every 4 hours PRN  apixaban 2.5 milliGRAM(s) Oral every 12 hours  aspirin enteric coated 81 milliGRAM(s) Oral daily  atorvastatin 40 milliGRAM(s) Oral at bedtime  budesonide 160 MICROgram(s)/formoterol 4.5 MICROgram(s) Inhaler 2 Puff(s) Inhalation two times a day  furosemide    Tablet 40 milliGRAM(s) Oral two times a day  hydrALAZINE 25 milliGRAM(s) Oral every 8 hours  melatonin 3 milliGRAM(s) Oral at bedtime PRN  metolazone 5 milliGRAM(s) Oral daily  metoprolol succinate ER 50 milliGRAM(s) Oral daily  multivitamin 1 Tablet(s) Oral daily  ondansetron Injectable 4 milliGRAM(s) IV Push every 8 hours PRN  pantoprazole    Tablet 40 milliGRAM(s) Oral before breakfast  senna 2 Tablet(s) Oral at bedtime  tiotropium 2.5 MICROgram(s) Inhaler 2 Puff(s) Inhalation daily      Objective:     Vital Signs:   T(C): 36.1 (11-27-23 @ 11:51), Max: 36.7 (11-26-23 @ 20:29)  HR: 76 (11-27-23 @ 13:30) (68 - 77)  BP: 113/50 (11-27-23 @ 13:30) (113/50 - 134/68)  RR: 16 (11-27-23 @ 11:51) (16 - 18)  SpO2: 94% (11-27-23 @ 11:51) (94% - 95%)  Wt(kg): --    Physical Exam:   General: no acute distress  Neck: supple   CVS: JVP ~ 9 cm H20, irregularly irregular, s1, s2  Pulm: unlabored respirations, CTAB  Ext: mild lower extremity edema; wrapped   Neuro: awake, alert and oriented       Labs:   27 Nov 2023 06:42    140    |  99     |  50     ----------------------------<  114    3.2     |  31     |  1.73     Ca    8.5        27 Nov 2023 06:42  Phos  3.9       27 Nov 2023 06:42  Mg     2.3       27 Nov 2023 06:42                            10.0   7.63  )-----------( 149      ( 27 Nov 2023 06:42 )             31.2         TTE (10/2023):   1. Limited echocardiogram performed.   2. Low normal global left ventricular systolic function.   3. Left ventricular ejection fraction, by visual estimation, is 50%.   4. Normal right ventricular size and function.   5. Left atrial enlargement.   6. Right atrial enlargement.   7. Moderate thickening and calcification of the anterior and posterior mitral valve leaflets.   8. Moderately decreased mitral leaflet mobility.   9. Moderate mitral valve stenosis (mean gradient    7 mmHg at HR 77 BPM, MVA 1.1 cm^2).  10. Mild tricuspid regurgitation.  11. Large pleural effusion in the left lateral region.  12. Small pericardial effusion.      ECG (11/22/23): atrial fibrillation

## 2023-11-27 NOTE — PROGRESS NOTE ADULT - PROBLEM SELECTOR PLAN 2
- Chronic, stable  - C/w metoprolol 50 mg QD   - Given age and renal function was prescribed Eliquis 2.5 mg BID - patient is non adherent to this as an outpatient - Chronic, noted since 9/2023  - CXR from 11/22/23: moderate to large LEFT pleural effusion   - Repeat CXR 11/24/23: s/p thora: marked decrease in left pleural effusion  - Repeat CXR 11/25/23: persistent advanced left base pleural pulmonary process  - Patient reports significant improvement in breathing after thora, will see if this is something that can be done palliatively outpatient  - F/U pleural fluid evaluation

## 2023-11-27 NOTE — DISCHARGE NOTE PROVIDER - NSDCMRMEDTOKEN_GEN_ALL_CORE_FT
albuterol 90 mcg/inh inhalation aerosol: 2 puff(s) inhaled every 6 hours  allopurinol 100 mg oral tablet: 1 tab(s) orally once a day  apixaban 2.5 mg oral tablet: 1 tab(s) orally every 12 hours  aspirin 81 mg oral delayed release tablet: 1 tab(s) orally once a day  atorvastatin 40 mg oral tablet: 1 tab(s) orally once a day (at bedtime)  budesonide-formoterol 160 mcg-4.5 mcg/inh inhalation aerosol: 2 puff(s) inhaled 2 times a day  dapagliflozin 10 mg oral tablet: 1 tab(s) orally every 24 hours  furosemide 40 mg oral tablet: 1 tab(s) orally 2 times a day  hydrALAZINE 25 mg oral tablet: 1 tab(s) orally every 8 hours  metOLazone 5 mg oral tablet: 1 tab(s) orally once a day  metoprolol succinate 50 mg oral tablet, extended release: 1 tab(s) orally once a day  Multiple Vitamins oral tablet: 1 tab(s) orally once a day  pantoprazole 40 mg oral delayed release tablet: 1 tab(s) orally once a day (before a meal)  tiotropium 2.5 mcg/inh inhalation aerosol: 2 puff(s) inhaled once a day   albuterol 90 mcg/inh inhalation aerosol: 2 puff(s) inhaled every 6 hours  allopurinol 100 mg oral tablet: 1 tab(s) orally once a day  apixaban 2.5 mg oral tablet: 1 tab(s) orally every 12 hours  aspirin 81 mg oral delayed release tablet: 1 tab(s) orally once a day  atorvastatin 40 mg oral tablet: 1 tab(s) orally once a day (at bedtime)  budesonide-formoterol 160 mcg-4.5 mcg/inh inhalation aerosol: 2 puff(s) inhaled 2 times a day  furosemide 40 mg oral tablet: 1 tab(s) orally 2 times a day  hydrALAZINE 25 mg oral tablet: 1 tab(s) orally every 8 hours  metOLazone 5 mg oral tablet: 1 tab(s) orally once a day  metoprolol succinate 50 mg oral tablet, extended release: 1 tab(s) orally once a day  Multiple Vitamins oral tablet: 1 tab(s) orally once a day  pantoprazole 40 mg oral delayed release tablet: 1 tab(s) orally once a day (before a meal)  tiotropium 2.5 mcg/inh inhalation aerosol: 2 puff(s) inhaled once a day   albuterol 90 mcg/inh inhalation aerosol: 2 puff(s) inhaled every 6 hours  allopurinol 100 mg oral tablet: 1 tab(s) orally once a day  apixaban 2.5 mg oral tablet: 1 tab(s) orally every 12 hours  aspirin 81 mg oral delayed release tablet: 1 tab(s) orally once a day  atorvastatin 40 mg oral tablet: 1 tab(s) orally once a day (at bedtime)  budesonide-formoterol 160 mcg-4.5 mcg/inh inhalation aerosol: 2 puff(s) inhaled 2 times a day  furosemide 40 mg oral tablet: 1 tab(s) orally 2 times a day  hydrALAZINE 25 mg oral tablet: 1 tab(s) orally every 8 hours  metOLazone 5 mg oral tablet: 1 tab(s) orally 2 times a week Please take 1 tablet by mouth on sunday and on wednesday  metoprolol succinate 50 mg oral tablet, extended release: 1 tab(s) orally once a day  Multiple Vitamins oral tablet: 1 tab(s) orally once a day  pantoprazole 40 mg oral delayed release tablet: 1 tab(s) orally once a day (before a meal)  tiotropium 2.5 mcg/inh inhalation aerosol: 2 puff(s) inhaled once a day

## 2023-11-27 NOTE — PROGRESS NOTE ADULT - PROBLEM SELECTOR PLAN 4
- Hx of CVA   - C/w atorvastatin 40 mg   - C/w aspirin 81 mg - Stage 3b  - Now IRINEO on CKD  - Baseline average Cr ~ 1.55, eGFR ~ 43  - 1.73 today - was 1.65 yesterday   - Not on ACE/ARBS due to renal function   - Continue to monitor BUN/CR while on Lasix  - Strict I&Os, maintaining appropriate output >0.5mL/kg/hr

## 2023-11-27 NOTE — DISCHARGE NOTE PROVIDER - ATTENDING DISCHARGE PHYSICAL EXAMINATION:
Gen: NAD  Cardio: RRR S1 S2 2/6 systolic murmur; pitting edema in lower extremities   lungs: diminished breath sounds at bases no wheezing

## 2023-11-27 NOTE — PHYSICAL THERAPY INITIAL EVALUATION ADULT - GENERAL OBSERVATIONS, REHAB EVAL
pt in chair, lethargic, oriented x3,c/o "did not sleep last night" vss per tele, sp02 98% 2L 02 nc. +ace wraps bilat LE's

## 2023-11-27 NOTE — PROGRESS NOTE ADULT - ATTENDING COMMENTS
Patient is an 88 y/o M with a PMH of HFpEF, chronic Afib (not compliant with Eliquis), CKD stage 3, and COPD (smokes 3 cigars daily) who presented to the ED BIBA on 11/22/23 with worsening shortness of breath that he noticed the morning of admission and increased bilateral leg swelling over the past week. He is currently admitted with acute hypoxic respiratory failure secondary to acute on chronic HFpEF exacerbation and large L sided pleural effusion. S/p thoracentesis 900ml serosanguinous output on 11/24/23, fluid eval pending, Discussed with cardio Dr Hong - continue PO Lasix 40mg BID and Metalozone. Unable to give Farixga at this time due to Cr.  Cr overall at baseline - slight bump.   Declining to stay for further monitoring. AMA paperwork signed. Patient with follow up appointment 11/30 with cardiologist.

## 2023-11-27 NOTE — PHYSICAL THERAPY INITIAL EVALUATION ADULT - ADDITIONAL COMMENTS
Pt lives alone in house 1 [address] no stairs inside, ambulates with RW. pt is independent w/ADL's, still works at [employer]

## 2023-11-27 NOTE — PROGRESS NOTE ADULT - ASSESSMENT
Physical Examination:  GENERAL:               Alert, Oriented, No acute distress.    HEENT:                    No JVD, dry MM  PULM:                     Bilateral air entry, diminished to auscultation bilaterally L>R, no significant sputum production, No Rales, No Rhonchi, No Wheezing  CVS:                         S1, S2,  +Murmur  ABD:                        Soft, nondistended, nontender, normoactive bowel sounds,   EXT:                         + edema, nontender, No Cyanosis or Clubbing   NEURO:                  Alert, oriented, interactive, nonfocal, follows commands  PSYC:                      Calm, + Insight.        Assessment  Dyspnea suspect due to acute on chronic Diastolic CHF  Chronic Left pleural effusions  s/p thoracentesis 11/24/23 - Exudate    Afib on a/c  Cigar smoker, at risk for copd.   CKD    Plan  continue a/c  f/u cytology results    monitor on room air  out of bed as tolerated  optimize cardiac meds as per Cardio  Rest of care as per primary team.

## 2023-11-27 NOTE — PROGRESS NOTE ADULT - PROBLEM SELECTOR PLAN 5
- Stable; smokes 3 cigars daily   - Continue Symbicort  - Not interested in nicotine patch at this time - Hx of CVA   - C/w atorvastatin 40 mg   - C/w aspirin 81 mg

## 2023-11-27 NOTE — DISCHARGE NOTE PROVIDER - HOSPITAL COURSE
Patient is an 89-year-old male with PMHx of HFrEF, chronic Afib (non-adherent w/ eliquis), CKD stage 3b, COPD, current cigar smoker (3/day) who was admitted for acute hypoxic respiratory failure i/s/o CHF exacerbation and L pleural effusion. Patient was treated with IV lasix for 4 days then switched to oral lasix 40mg BID with addition of metolazone 5mg QD. His farxiga was not continued during this admission given his IRINEO on CKD3b. Patient also had a thoracentesis completed for his L pleural effusion on 11/24, with symptomatic improvement of his SOB. Thoracentesis extracted 900mL of serosanguinous fluid and the pleural fluid evaluation is still pending. Patient was seen by physical therapy with recommendations of no home PT requirements. Patient ambulates with a rolling walker and will continue using this.     Patient is medically optimized for discharge home.     CONSULTS: Cardiology, Pulmonology, Physical Therapy  IMAGING:  Xray Chest 1 View AP/PA (11.22.23 @ 14:15)   IMPRESSION: Moderate to large left pleural effusion    Xray Chest 1 View- PORTABLE-Routine (Xray Chest 1 View- PORTABLE-Routine .) (11.24.23 @ 12:44)   IMPRESSION: Marked decrease in left pleural effusion    Xray Chest 1 View- PORTABLE-Routine (Xray Chest 1 View- PORTABLE-Routine in AM.) (11.25.23 @ 09:04)   IMPRESSION: Persistent advanced left base pleural pulmonary process.    US Duplex Venous Lower Ext Complete, Bilateral (11.25.23 @ 10:41)   IMPRESSION:  No evidence of deep venous thrombosis in either lower extremity.    VITALS:  Vital Signs Last 24 Hrs  T(C): 36.2 (27 Nov 2023 05:22), Max: 36.7 (26 Nov 2023 20:29)  T(F): 97.1 (27 Nov 2023 05:22), Max: 98 (26 Nov 2023 20:29)  HR: 68 (27 Nov 2023 05:22) (68 - 78)  BP: 134/68 (27 Nov 2023 05:22) (116/60 - 134/68)  BP(mean): --  RR: 18 (27 Nov 2023 10:49) (16 - 18)  SpO2: 95% (27 Nov 2023 10:49) (93% - 95%)    Parameters below as of 27 Nov 2023 10:49  Patient On (Oxygen Delivery Method): room air      PHYSICAL EXAM:          Patient is an 89-year-old male with PMHx of HFrEF, chronic Afib (non-adherent w/ eliquis), CKD stage 3b, COPD, current cigar smoker (3/day) who was admitted for acute hypoxic respiratory failure i/s/o CHF exacerbation and L pleural effusion. Patient was treated with IV lasix for 4 days then switched to oral lasix 40mg BID with addition of metolazone 5mg QD. His farxiga was not continued during this admission given his IRINEO on CKD3b. Patient also had a thoracentesis completed for his L pleural effusion on 11/24, with symptomatic improvement of his SOB. Thoracentesis extracted 900mL of serosanguinous fluid and the pleural fluid evaluation is still pending. Patient was seen by physical therapy with recommendations of no home PT requirements. Patient ambulates with a rolling walker and will continue using this. Patient to follow-up with Pulmonology and Cardiology in the next week. Scheduled to see Dr. Rubio 11/30/23 @ 3:45pm. Patient was counseled on smoking cessation during his hospitalization but declined any cessation products at this time.     Patient is medically optimized for discharge home.     CONSULTS: Cardiology, Pulmonology, Physical Therapy  IMAGING:  Xray Chest 1 View AP/PA (11.22.23 @ 14:15)   IMPRESSION: Moderate to large left pleural effusion    Xray Chest 1 View- PORTABLE-Routine (Xray Chest 1 View- PORTABLE-Routine .) (11.24.23 @ 12:44)   IMPRESSION: Marked decrease in left pleural effusion    Xray Chest 1 View- PORTABLE-Routine (Xray Chest 1 View- PORTABLE-Routine in AM.) (11.25.23 @ 09:04)   IMPRESSION: Persistent advanced left base pleural pulmonary process.    US Duplex Venous Lower Ext Complete, Bilateral (11.25.23 @ 10:41)   IMPRESSION:  No evidence of deep venous thrombosis in either lower extremity.    VITALS:  Vital Signs Last 24 Hrs  T(C): 36.2 (27 Nov 2023 05:22), Max: 36.7 (26 Nov 2023 20:29)  T(F): 97.1 (27 Nov 2023 05:22), Max: 98 (26 Nov 2023 20:29)  HR: 68 (27 Nov 2023 05:22) (68 - 78)  BP: 134/68 (27 Nov 2023 05:22) (116/60 - 134/68)  BP(mean): --  RR: 18 (27 Nov 2023 10:49) (16 - 18)  SpO2: 95% (27 Nov 2023 10:49) (93% - 95%)    Parameters below as of 27 Nov 2023 10:49  Patient On (Oxygen Delivery Method): room air      PHYSICAL EXAM:   GENERAL: NAD, sitting up in chair, elder  NERVOUS SYSTEM:  Alert & Oriented x 3  HEAD:  Atraumatic, Normocephalic  EYES: EOMI, conjunctiva clear and sclera white   NECK: Supple   CHEST/LUNG: Decreased breath sound LLL, CTAB otherwise w/o rales, rhonchi, wheezes  HEART: irregular rate and rhythm   ABDOMEN: Soft, Nontender, Nondistended   EXTREMITIES:  2+ bilateral pre-tibial pitting edema, currently compression wrapped, pedal edema       Patient is an 89-year-old male with PMHx of HFrEF, chronic Afib (non-adherent w/ eliquis), CKD stage 3b, COPD, current cigar smoker (3/day) who was admitted for acute hypoxic respiratory failure i/s/o CHF exacerbation and L pleural effusion. Patient was treated with IV lasix for 4 days then switched to oral lasix 40mg BID with addition of metolazone 5mg QD. His farxiga was not continued during this admission given his IRINEO on CKD3b. Patient also had a thoracentesis completed for his L pleural effusion on 11/24, with symptomatic improvement of his SOB. Thoracentesis extracted 900mL of serosanguinous fluid and the pleural fluid evaluation is still pending. Patient was seen by physical therapy with recommendations of no home PT requirements. Patient ambulates with a rolling walker and will continue using this.     Patient was told that it would be beneficial for him to stay another day to optimize his fluid status but patient declined and would prefer to go home. They have normal mental status and adequate capacity to make medical decisions. Patient was explained the risk of leaving AMA including increased disability. The risks have been explained to the patient, including worsening illness, chronic pain, permanent disability and death. The benefits of admission have also been explained, including the availability and proximity of nurses, physicians, monitoring, diagnostic testing, treatment and further optimization of his current health status. The patient was able to understand and state the risks and benefits of hospital admission. This was witnessed by nurse, Anna, and JOEL. They had the opportunity to ask questions about their medical condition. The patient was treated to the extent that they would allow and knows that they may return for care at any time.    Patient to follow-up with Pulmonology and Cardiology in the next week. Scheduled to see Dr. Rubio 11/30/23 @ 3:45pm. Patient was counseled on smoking cessation during his hospitalization but declined any cessation products at this time.     Patient is not medically optimized for discharge home but is leaving AMA.     CONSULTS: Cardiology, Pulmonology, Physical Therapy  IMAGING:  Xray Chest 1 View AP/PA (11.22.23 @ 14:15)   IMPRESSION: Moderate to large left pleural effusion    Xray Chest 1 View- PORTABLE-Routine (Xray Chest 1 View- PORTABLE-Routine .) (11.24.23 @ 12:44)   IMPRESSION: Marked decrease in left pleural effusion    Xray Chest 1 View- PORTABLE-Routine (Xray Chest 1 View- PORTABLE-Routine in AM.) (11.25.23 @ 09:04)   IMPRESSION: Persistent advanced left base pleural pulmonary process.    US Duplex Venous Lower Ext Complete, Bilateral (11.25.23 @ 10:41)   IMPRESSION:  No evidence of deep venous thrombosis in either lower extremity.    VITALS:  Vital Signs Last 24 Hrs  T(C): 36.2 (27 Nov 2023 05:22), Max: 36.7 (26 Nov 2023 20:29)  T(F): 97.1 (27 Nov 2023 05:22), Max: 98 (26 Nov 2023 20:29)  HR: 68 (27 Nov 2023 05:22) (68 - 78)  BP: 134/68 (27 Nov 2023 05:22) (116/60 - 134/68)  BP(mean): --  RR: 18 (27 Nov 2023 10:49) (16 - 18)  SpO2: 95% (27 Nov 2023 10:49) (93% - 95%)    Parameters below as of 27 Nov 2023 10:49  Patient On (Oxygen Delivery Method): room air      PHYSICAL EXAM:   GENERAL: NAD, sitting up in chair, elder  NERVOUS SYSTEM:  Alert & Oriented x 3  HEAD:  Atraumatic, Normocephalic  EYES: EOMI, conjunctiva clear and sclera white   NECK: Supple   CHEST/LUNG: Decreased breath sound LLL, CTAB otherwise w/o rales, rhonchi, wheezes  HEART: irregular rate and rhythm   ABDOMEN: Soft, Nontender, Nondistended   EXTREMITIES:  2+ bilateral pre-tibial pitting edema, currently compression wrapped, pedal edema

## 2023-11-27 NOTE — PROGRESS NOTE ADULT - ASSESSMENT
Assessment:  Jaylon Antony is an 89 year old man with past medical history of Atrial fibrillation (on Eliquis), HFrEF (LVEF 25-30% in 2019), Hypertension, Chronic kidney disease, COPD, history of CVA with recurrent hospitalizations for congestive heart failure likely from medication noncompliance now presents with congestive heart failure exacerbation and left pleural effusion s/p thoracentesis.     ECG on admission consistent with atrial fibrillation, similar to prior ECG. Troponin not elevated and patient denies angina, does not appear to be an acute coronary syndrome. Pro BNP elevated. Recent echo consistent with low normal LVEF 50% and moderate mitral stenosis. CT chest consistent with large left pleural effusion.    Recommendations:  [] HFpEF, Moderate mitral stenosis: Likely secondary to component of noncompliance. S/p IV diuresis, appears near euvolemic, saturating well on room air. Continue Lasix 40 mg PO BID with metolazone 5 mg twice weekly. Resume Farxiga if ok with Nephrology given CKD. Patient will require close outpatient follow up with his cardiologist to prevent re-admissions to hospital and should comply with medications he is discharged on. Recommend 1 L daily fluid restriction. Plan for outpatient surveillance echo imaging of mitral stenosis.  [] Left pleural effusion s/p thoracentesis: Follow up cytology and fluid studies per Pulmonary   [] Atrial fibrillation: Continue beta blocker, continue Eliquis for stroke prophylaxis    The patient is CV stable for discharge home today with close follow up with his cardiologist in one week. Discussed with primary team.    Kevin Hong MD  Cardiology

## 2023-11-27 NOTE — DISCHARGE NOTE NURSING/CASE MANAGEMENT/SOCIAL WORK - NSDCFUADDAPPT_GEN_ALL_CORE_FT
We made you a hospital followup appointment with Dr. Rubio on 11/30/23 at 3:45pm, office #[phone].

## 2023-11-27 NOTE — PROGRESS NOTE ADULT - PROBLEM SELECTOR PLAN 1
- EMS noted patient with SpO2 of 88% on room air   - Likely due to CHF exacerbation and pleural effusion as patient presenting with worsening SOB/increased leg swelling   - ECHO from 10/16/23: EF of 50% and left sided pleural effusion  - CXR from 11/22/23: moderate to large LEFT pleural effusion   - CT chest from 11/22/23: cardiomegaly with large left pleural effusion as seen on the chest x-ray  - Pro-BNP of 3788 upon admission, 3595 on 11/25/23  - Supplemental oxygen PRN, not currently using  - Strict I&Os, fluid restriction 1.2L, low sodium diet  - S/p IV Lasix 80 mg in the ED   - As of 11/26/23: will transition from IV Lasix to PO Lasix - continue 40 mg BID  - C/w metolazone 5mg QD   - C/w metoprolol 50 mg QD  - Appreciate cardio consult   - Appreciate pulmonology consult:  --> s/p thoracentesis on 11/24/23; pending results - Presented w/ acute hypoxic resp failure, now resolved  - Multifactorial due to CHF exacerbation and pleural effusion, resolved   - ECHO from 10/16/23: EF of 50% and left sided pleural effusion  - CT chest from 11/22/23: cardiomegaly with large left pleural effusion as seen on the chest x-ray  - Pro-BNP of 3788 upon admission, 3595 on 11/25/23  - Supplemental oxygen PRN, not currently using  - Strict I&Os, fluid restriction 1.2L, low sodium diet  - S/p IV Lasix 80 mg in the ED   - As of 11/26/23: transitioned from IV Lasix to PO Lasix - continue 40 mg BID, will D/c on 40mg QD  - C/w metolazone 5mg QD   - C/w metoprolol 50 mg QD  - Farxiga not ordered due to patient's continued IRINEO on CKD, may consider adding outpatient   - Appreciate cardio recs  - Appreciate pulmonology recs - Presented w/ acute hypoxic resp failure, now resolved  - Multifactorial due to CHF exacerbation and pleural effusion, resolved   - ECHO from 10/16/23: EF of 50% and left sided pleural effusion  - CT chest from 11/22/23: cardiomegaly with large left pleural effusion as seen on the chest x-ray  - Pro-BNP of 3788 upon admission, 3595 on 11/25/23  - Supplemental oxygen PRN, not currently using  - Strict I&Os, fluid restriction 1.2L, low sodium diet  - S/p IV Lasix 80 mg in the ED   - As of 11/26/23: transitioned from IV Lasix to PO Lasix - continue 40 mg BID, will D/c on 40mg QD  - C/w metolazone 5mg QD, may need to increase, will F/U w/ Cardiology  - C/w metoprolol 50 mg QD  - Farxiga not ordered/continued due to patient's continued IRINEO on CKD, may consider adding outpatient   - Appreciate cardio recs  - Appreciate pulmonology recs

## 2023-11-27 NOTE — PROGRESS NOTE ADULT - PROVIDER SPECIALTY LIST ADULT
Cardiology
Pulmonology
Cardiology
Critical Care
Family Medicine
Pulmonology
Pulmonology
Family Medicine

## 2023-11-27 NOTE — PROGRESS NOTE ADULT - TIME BILLING
Reviewing chart notes and data, reviewing telemetry monitor records, face to face time counseling the patient, communicating with Dr. Hong cardio, coordinating care with SW/CM at [facility].   Pt seen and examined. Case discussed with resident. Agree with assessment and plan above (edited by me in detail)

## 2023-11-27 NOTE — PROGRESS NOTE ADULT - PROBLEM SELECTOR PLAN 7
DVT ppx: On eliquis 2.5 mg BID - Chronic, stable  - C/w metoprolol 50 mg QD   - C/w hydralazine 25 mg TID

## 2023-11-27 NOTE — PROGRESS NOTE ADULT - SUBJECTIVE AND OBJECTIVE BOX
Follow-up Critical Care Progress Note  Chief Complaint : Heart failure        Patient seen and examined  comfortable.   complain of episodes of of light headed ness        Allergies :No Known Allergies      PAST MEDICAL & SURGICAL HISTORY:  Afib    Congestive heart failure (CHF)    CVA (cerebral vascular accident)    Hypertension, unspecified type    Chronic obstructive pulmonary disease (COPD)    No significant past surgical history        Medications:  MEDICATIONS  (STANDING):  allopurinol 100 milliGRAM(s) Oral daily  apixaban 2.5 milliGRAM(s) Oral every 12 hours  aspirin enteric coated 81 milliGRAM(s) Oral daily  atorvastatin 40 milliGRAM(s) Oral at bedtime  budesonide 160 MICROgram(s)/formoterol 4.5 MICROgram(s) Inhaler 2 Puff(s) Inhalation two times a day  furosemide    Tablet 40 milliGRAM(s) Oral two times a day  hydrALAZINE 25 milliGRAM(s) Oral every 8 hours  metolazone 5 milliGRAM(s) Oral daily  metoprolol succinate ER 50 milliGRAM(s) Oral daily  multivitamin 1 Tablet(s) Oral daily  pantoprazole    Tablet 40 milliGRAM(s) Oral before breakfast  senna 2 Tablet(s) Oral at bedtime  tiotropium 2.5 MICROgram(s) Inhaler 2 Puff(s) Inhalation daily    MEDICATIONS  (PRN):  acetaminophen     Tablet .. 650 milliGRAM(s) Oral every 6 hours PRN Temp greater or equal to 38C (100.4F), Mild Pain (1 - 3)  albuterol    90 MICROgram(s) HFA Inhaler 2 Puff(s) Inhalation every 6 hours PRN Shortness of Breath and/or Wheezing  aluminum hydroxide/magnesium hydroxide/simethicone Suspension 30 milliLiter(s) Oral every 4 hours PRN Dyspepsia  melatonin 3 milliGRAM(s) Oral at bedtime PRN Insomnia  ondansetron Injectable 4 milliGRAM(s) IV Push every 8 hours PRN Nausea and/or Vomiting      Antibiotics History      Heme Medications   apixaban 2.5 milliGRAM(s) Oral every 12 hours, 11-25-23 @ 13:26  aspirin enteric coated 81 milliGRAM(s) Oral daily, 11-22-23 @ 16:10      GI Medications  aluminum hydroxide/magnesium hydroxide/simethicone Suspension 30 milliLiter(s) Oral every 4 hours, 11-22-23 @ 15:59, Routine PRN  pantoprazole    Tablet 40 milliGRAM(s) Oral before breakfast, 11-22-23 @ 16:11, Routine  senna 2 Tablet(s) Oral at bedtime, 11-23-23 @ 14:03, Routine    Home Medications:  Last Order Reconciliation Date: 11-22-23 (Admission Reconciliation)  albuterol 90 mcg/inh inhalation aerosol: 2 puff(s) inhaled every 6 hours (11-24-23)  allopurinol 100 mg oral tablet: 1 tab(s) orally once a day (11-24-23)  apixaban 2.5 mg oral tablet: 1 tab(s) orally every 12 hours (11-24-23)  aspirin 81 mg oral delayed release tablet: 1 tab(s) orally once a day (11-24-23)  atorvastatin 40 mg oral tablet: 1 tab(s) orally once a day (at bedtime) (11-24-23)  budesonide-formoterol 160 mcg-4.5 mcg/inh inhalation aerosol: 2 puff(s) inhaled 2 times a day (11-24-23)  dapagliflozin 10 mg oral tablet: 1 tab(s) orally every 24 hours (11-24-23)  furosemide 40 mg oral tablet: 1 tab(s) orally 2 times a day (11-27-23)  hydrALAZINE 25 mg oral tablet: 1 tab(s) orally every 8 hours (11-24-23)  metOLazone 5 mg oral tablet: 1 tab(s) orally once a day (11-27-23)  metoprolol succinate 50 mg oral tablet, extended release: 1 tab(s) orally once a day (11-24-23)  Multiple Vitamins oral tablet: 1 tab(s) orally once a day (11-24-23)  pantoprazole 40 mg oral delayed release tablet: 1 tab(s) orally once a day (before a meal) (11-24-23)  tiotropium 2.5 mcg/inh inhalation aerosol: 2 puff(s) inhaled once a day (11-24-23)        COVID  11-22-23 @ 13:35  COVID -   NotDetec  10-15-23 @ 22:50  COVID -   NotDetec  09-18-23 @ 19:50  COVID -   NotDetec  01-02-23 @ 10:55  COVID -   NotDetec  01-02-23 @ 06:00  COVID -   NotDetec  12-17-22 @ 07:00  COVID -   NotDetec  06-15-21 @ 13:45  COVID -   NotDetec  06-12-21 @ 20:51  COVID -   NotDetec  02-17-21 @ 19:00  COVID -   Detected<!!>      COVID Biomarkers    11-25-23 @ 05:45 ESR --  ---  CRP --  ---  DDimer  --   ---      ---   Ferritin --    10-15-23 @ 22:30 ESR --  ---  CRP --  ---  DDimer  1162<H>   ---   LDH --   ---   Ferritin --            Trend Cardiac Enzymes    Trend BNP  11-25-23 @ 05:45   -  3595<H>  11-22-23 @ 13:35   -  3788<H>  10-15-23 @ 22:30   -  2833<H>  09-23-23 @ 07:00   -  2902<H>  09-18-23 @ 12:07   -  2100<H>  12-26-22 @ 05:00   -  3188<H>    Procalcitonin Trend  12-26-22 @ 05:00   -   0.20<H>  12-24-22 @ 06:00   -   0.32<H>  12-23-22 @ 16:15   -   0.28<H>  12-23-22 @ 06:35   -   0.25<H>  12-17-22 @ 19:30   -   0.23<H>    WBC Trend  11-27-23 @ 06:42   -  7.63  11-26-23 @ 07:00   -  7.67  11-25-23 @ 05:45   -  8.60  11-24-23 @ 17:45   -  9.72    H/H Trend  11-27-23 @ 06:42   -   10.0<L>/ 31.2<L>  11-26-23 @ 07:00   -   10.5<L>/ 33.6<L>  11-25-23 @ 05:45   -   10.7<L>/ 33.9<L>  11-24-23 @ 17:45   -   10.6<L>/ 33.4<L>  11-24-23 @ 06:55   -   10.7<L>/ 33.7<L>  11-23-23 @ 07:03   -   10.2<L>/ 32.1<L>    Stool Occult Blood  01-09-23 @ 12:22   -   Negative  12-19-22 @ 18:10   -   Positive<!>    Platelet Trend  11-27-23 @ 06:42   -  149<L>  11-26-23 @ 07:00   -  160  11-25-23 @ 05:45   -  162  11-24-23 @ 17:45   -  172    Trend Sodium  11-27-23 @ 06:42   -  140  11-26-23 @ 07:00   -  143  11-25-23 @ 05:45   -  144    Trend Potassium  11-27-23 @ 06:42   -  3.2<L>  11-26-23 @ 07:00   -  3.7  11-25-23 @ 05:45   -  3.8    Trend Bun/Cr  11-27-23 @ 06:42  BUN/CR -  50<H> / 1.73<H>  11-26-23 @ 07:00  BUN/CR -  50<H> / 1.65<H>  11-25-23 @ 05:45  BUN/CR -  52<H> / 1.71<H>    Lactic Acid Trend    ABG Trend  10-16-23 @ 05:30   - 7.46<H>/44/72<L>/95.4  12-17-22 @ 10:35   - 7.37/45/87/97.0  09-30-19 @ 04:38   - 7.41/35/305<H>/>99<H>  09-30-19 @ 01:28   - 7.18<LL>/57<H>/103/95    Trend AST/ALT/ALK Phos/Bili  11-23-23 @ 07:03   21/15/63/0.5  11-22-23 @ 13:35   42<H>/14/64/0.6  10-16-23 @ 07:34   24/21/69/0.7  10-15-23 @ 22:30   25/18/76/0.5  09-18-23 @ 12:07   19/15/68/0.9  01-12-23 @ 06:00   24/17/57/0.5  01-09-23 @ 06:12   21/20/50/0.5  01-05-23 @ 06:35   31/28/54/0.6  01-04-23 @ 07:05   34/37/53/0.6  01-02-23 @ 07:48   36/33/49/0.5  12-31-22 @ 07:20   24/23/48/0.4  12-20-22 @ 05:50   26/13/55/0.5      Ammonia Trend      Amylase / Lipase Trend      Albumin Trend  11-23-23 @ 07:03   -   2.7<L>  11-22-23 @ 13:35   -   2.7<L>  10-16-23 @ 07:34   -   2.6<L>  10-15-23 @ 22:30   -   2.7<L>  09-18-23 @ 12:07   -   3.0<L>  01-12-23 @ 06:00   -   2.7<L>      PTT - PT - INR Trend  11-22-23 @ 13:35   -   37.9<H> - 23.6<H> - 2.12<H>  10-15-23 @ 22:30   -   32.4 - 12.5 - 1.07    Glucose Trend  11-27-23 @ 06:42   -  114<H> -- --  11-26-23 @ 07:00   -  104<H> -- --  11-25-23 @ 05:45   -  102<H> -- --    A1C with Estimated Average Glucose Result: 5.8 % *H* [4.0 - 5.6] (09-20-23 @ 09:53)  A1C with Estimated Average Glucose Result: 5.8 % *H* [4.0 - 5.6] (09-19-23 @ 07:28)      LABS:                        10.0   7.63  )-----------( 149      ( 27 Nov 2023 06:42 )             31.2     11-27    140  |  99  |  50<H>  ----------------------------<  114<H>  3.2<L>   |  31  |  1.73<H>    Ca    8.5      27 Nov 2023 06:42  Phos  3.9     11-27  Mg     2.3     11-27         CULTURES: (if applicable)    Culture - Fungal, Body Fluid (collected 11-24-23 @ 11:59)  Source: Pleural Fl Pleural Fluid  Preliminary Report (11-25-23 @ 10:08):    Testing in progress    Culture - Body Fluid with Gram Stain (collected 11-24-23 @ 11:59)  Source: Pleural Fl Pleural Fluid  Gram Stain (11-25-23 @ 00:43):    No polymorphonuclear cells seen per low power field    No organisms seen per oil power field  Preliminary Report (11-25-23 @ 15:15):    No growth      Rapid RVP Result: NotDetec (11-22-23 @ 13:35)       VITALS:  T(C): 36.2 (11-27-23 @ 05:22), Max: 36.7 (11-26-23 @ 20:29)  T(F): 97.1 (11-27-23 @ 05:22), Max: 98 (11-26-23 @ 20:29)  HR: 68 (11-27-23 @ 05:22) (68 - 78)  BP: 134/68 (11-27-23 @ 05:22) (116/60 - 134/68)  BP(mean): --  ABP: --  ABP(mean): --  RR: 18 (11-27-23 @ 10:49) (16 - 18)  SpO2: 95% (11-27-23 @ 10:49) (93% - 95%)  CVP(mm Hg): --  CVP(cm H2O): --    Ins and Outs     11-26-23 @ 07:01  -  11-27-23 @ 07:00  --------------------------------------------------------  IN: 0 mL / OUT: 400 mL / NET: -400 mL    11-27-23 @ 07:01  -  11-27-23 @ 11:44  --------------------------------------------------------  IN: 100 mL / OUT: 300 mL / NET: -200 mL          Weight (kg): 75.75 (11-25-23 @ 04:49)        I&O's Detail    26 Nov 2023 07:01  -  27 Nov 2023 07:00  --------------------------------------------------------  IN:  Total IN: 0 mL    OUT:    Voided (mL): 400 mL  Total OUT: 400 mL    Total NET: -400 mL      27 Nov 2023 07:01  -  27 Nov 2023 11:45  --------------------------------------------------------  IN:    Oral Fluid: 100 mL  Total IN: 100 mL    OUT:    Voided (mL): 300 mL  Total OUT: 300 mL    Total NET: -200 mL

## 2023-11-27 NOTE — DISCHARGE NOTE PROVIDER - PROVIDER TOKENS
PROVIDER:[TOKEN:[[id number]],FOLLOWUP:[1 week],ESTABLISHEDPATIENT:[T]] PROVIDER:[TOKEN:[[id number]],FOLLOWUP:[1 week],ESTABLISHEDPATIENT:[T]],PROVIDER:[TOKEN:[[id number]],FOLLOWUP:[1 week],ESTABLISHEDPATIENT:[T]]

## 2023-11-27 NOTE — DISCHARGE NOTE NURSING/CASE MANAGEMENT/SOCIAL WORK - [id number]_GEN_ALL_CORE
For information on Fall & Injury Prevention, visit: [email or web address] OR  [email or web address] OR  https://www.cdc.gov/steadi/patient.html

## 2023-11-27 NOTE — DISCHARGE NOTE PROVIDER - NSDCCPCAREPLAN_GEN_ALL_CORE_FT
PRINCIPAL DISCHARGE DIAGNOSIS  Diagnosis: Acute exacerbation of CHF (congestive heart failure)  Assessment and Plan of Treatment: Heart failure (HF) is a condition that does not allow your heart to fill or pump properly. Not enough oxygen in your blood gets to your organs and tissues. HF is often caused by damage or injury to your heart. The damage may be caused by heart attack, other heart conditions, or high blood pressure. HF is a long-term condition that tends to get worse over time. It is important to manage your health to improve your quality of life. HF can be worsened by heavy alcohol use, smoking, diabetes that is not controlled, or obesity.  PLEASE start taking:  Metolazone 5mg, take 1 tablet by mouth every day.  Furosemide 40mg, take 1 tablet by mouth every day.  PLEASE follow-up with your Cardiologist, Dr. Rubio, in the next 7 days for continued management of this condition.  Call 911 for:  Squeezing, pressure, or pain in your chest that lasts longer than 5 minutes or returns  Discomfort or pain in your back, neck, jaw, stomach, or arm  Trouble breathing  Nausea or vomiting  Lightheadedness or a sudden cold sweat, especially with chest pain or trouble breathing.  Seek care immediately if:  You gain 3 or more pounds (1.4 kg) in a day  Your heartbeat is fast, slow, or uneven all the time.  Do not smoke. Nicotine and other chemicals in cigarettes and cigars can cause lung damage and make HF difficult to manage.   Do not drink alcohol or take illegal drugs. Weigh yourself every morning. Use the same scale, in the same spot. Do this after you use the bathroom, but before you eat or drink anything. Record your weight each day so you will notice any sudden weight gain. Swelling and weight gain are signs of fluid retention. Manage any chronic health conditions you have. These include high blood pressure, diabetes, obesity, high cholesterol, metabolic syndrome, and COPD. Stay active. If you are not active, your symptoms are likely to worsen quickly. Get a flu shot every year. You may also need the pneumonia vaccine. The flu and pneumonia can be severe for a person who has HF.      SECONDARY DISCHARGE DIAGNOSES  Diagnosis: Pleural effusion, left  Assessment and Plan of Treatment: During this hospitalization, you were found to have a left-sided pleural effusion, which means you have excess fluid buildup in the space between the layers of the pleura. The pleura are thin layers of tissue that form a 2-layered lining around the lungs. One layer of the pleura rests directly on the lungs. The other layer rests on the chest wall. There is normally a small amount of fluid called pleural fluid between these layers. You had a thoracentesis, a procedure to drain this fluid, during this hospitalization on 11/24/23 and the evaluation of this fluid is still pending.   Do not smoke, and do not allow others to smoke around you. If you smoke, it is never too late to quit. Smoking increases your risk for lung infections such as pneumonia. Smoking also makes it harder for you to get better after you have a lung problem.  Deep breathing and coughing will decrease your risk for a lung infection. Take a deep breath and hold it for as long as you can. Let the air out and then cough strongly. Deep breaths help open your airway. You may be given an incentive spirometer to help you take deep breaths. Put the plastic piece in your mouth and take a slow, deep breath. Then let the air out and cough. Repeat these steps 10 times every hour.  If you experience coughing, holding a pillow over your chest when you cough may help decrease the pain.  Call your doctor if you are coughing up blood, have chest pain, shortness of breath or a fever of 100.4. If you had a thoracentesis to drain the fluid, seek medical attention for any signs of infection, including increased pain, redness, or swelling, warmth, or foul-smelling drainage.       PRINCIPAL DISCHARGE DIAGNOSIS  Diagnosis: Acute exacerbation of CHF (congestive heart failure)  Assessment and Plan of Treatment: Heart failure (HF) is a condition that does not allow your heart to fill or pump properly. Not enough oxygen in your blood gets to your organs and tissues. HF is often caused by damage or injury to your heart. The damage may be caused by heart attack, other heart conditions, or high blood pressure. HF is a long-term condition that tends to get worse over time. It is important to manage your health to improve your quality of life. HF can be worsened by heavy alcohol use, smoking, diabetes that is not controlled, or obesity.  PLEASE start taking:  Metolazone 5mg, take 1 tablet by mouth every day.  Furosemide 40mg, take 1 tablet by mouth twice a day.  PLEASE follow-up with your Cardiologist, Dr. Rubio, in the next 7 days for continued management of this condition.  Call 911 for:  Squeezing, pressure, or pain in your chest that lasts longer than 5 minutes or returns  Discomfort or pain in your back, neck, jaw, stomach, or arm  Trouble breathing  Nausea or vomiting  Lightheadedness or a sudden cold sweat, especially with chest pain or trouble breathing.  Seek care immediately if:  You gain 3 or more pounds (1.4 kg) in a day  Your heartbeat is fast, slow, or uneven all the time.  Do not smoke. Nicotine and other chemicals in cigarettes and cigars can cause lung damage and make HF difficult to manage.   Do not drink alcohol or take illegal drugs. Weigh yourself every morning. Use the same scale, in the same spot. Do this after you use the bathroom, but before you eat or drink anything. Record your weight each day so you will notice any sudden weight gain. Swelling and weight gain are signs of fluid retention. Manage any chronic health conditions you have. These include high blood pressure, diabetes, obesity, high cholesterol, metabolic syndrome, and COPD. Stay active. If you are not active, your symptoms are likely to worsen quickly.      SECONDARY DISCHARGE DIAGNOSES  Diagnosis: Pleural effusion, left  Assessment and Plan of Treatment: During this hospitalization, you were found to have a left-sided pleural effusion, which means you have excess fluid buildup in the space between the layers of the pleura. The pleura are thin layers of tissue that form a 2-layered lining around the lungs. One layer of the pleura rests directly on the lungs. The other layer rests on the chest wall. There is normally a small amount of fluid called pleural fluid between these layers. You had a thoracentesis, a procedure to drain this fluid, during this hospitalization on 11/24/23 and the evaluation of this fluid is still pending.   Do not smoke, and do not allow others to smoke around you. If you smoke, it is never too late to quit. Smoking increases your risk for lung infections such as pneumonia. Smoking also makes it harder for you to get better after you have a lung problem.  Deep breathing and coughing will decrease your risk for a lung infection. Take a deep breath and hold it for as long as you can. Let the air out and then cough strongly. Deep breaths help open your airway. You may be given an incentive spirometer to help you take deep breaths. Put the plastic piece in your mouth and take a slow, deep breath. Then let the air out and cough. Repeat these steps 10 times every hour.  If you experience coughing, holding a pillow over your chest when you cough may help decrease the pain.  Call your doctor if you are coughing up blood, have chest pain, shortness of breath or a fever of 100.4. If you had a thoracentesis to drain the fluid, seek medical attention for any signs of infection, including increased pain, redness, or swelling, warmth, or foul-smelling drainage.  PLEASE follow-up with Pulmonology for further management of your condition.        PRINCIPAL DISCHARGE DIAGNOSIS  Diagnosis: Acute exacerbation of CHF (congestive heart failure)  Assessment and Plan of Treatment: Heart failure (HF) is a condition that does not allow your heart to fill or pump properly. Not enough oxygen in your blood gets to your organs and tissues. HF is often caused by damage or injury to your heart. The damage may be caused by heart attack, other heart conditions, or high blood pressure. HF is a long-term condition that tends to get worse over time. It is important to manage your health to improve your quality of life. HF can be worsened by heavy alcohol use, smoking, diabetes that is not controlled, or obesity.  PLEASE start taking:  Metolazone 5mg, take 1 tablet by mouth every day.  Furosemide 40mg, take 1 tablet by mouth twice a week (Sunday and Wednesday)  PLEASE follow-up with your Cardiologist, Dr. Rubio, in the next 7 days for continued management of this condition.  Call 911 for:  Squeezing, pressure, or pain in your chest that lasts longer than 5 minutes or returns  Discomfort or pain in your back, neck, jaw, stomach, or arm  Trouble breathing  Nausea or vomiting  Lightheadedness or a sudden cold sweat, especially with chest pain or trouble breathing.  Seek care immediately if:  You gain 3 or more pounds (1.4 kg) in a day  Your heartbeat is fast, slow, or uneven all the time.  Do not smoke. Nicotine and other chemicals in cigarettes and cigars can cause lung damage and make HF difficult to manage.   Do not drink alcohol or take illegal drugs. Weigh yourself every morning. Use the same scale, in the same spot. Do this after you use the bathroom, but before you eat or drink anything. Record your weight each day so you will notice any sudden weight gain. Swelling and weight gain are signs of fluid retention. Manage any chronic health conditions you have. These include high blood pressure, diabetes, obesity, high cholesterol, metabolic syndrome, and COPD. Stay active. If you are not active, your symptoms are likely to worsen quickly.      SECONDARY DISCHARGE DIAGNOSES  Diagnosis: Pleural effusion, left  Assessment and Plan of Treatment: During this hospitalization, you were found to have a left-sided pleural effusion, which means you have excess fluid buildup in the space between the layers of the pleura. The pleura are thin layers of tissue that form a 2-layered lining around the lungs. One layer of the pleura rests directly on the lungs. The other layer rests on the chest wall. There is normally a small amount of fluid called pleural fluid between these layers. You had a thoracentesis, a procedure to drain this fluid, during this hospitalization on 11/24/23 and the evaluation of this fluid is still pending.   Do not smoke, and do not allow others to smoke around you. If you smoke, it is never too late to quit. Smoking increases your risk for lung infections such as pneumonia. Smoking also makes it harder for you to get better after you have a lung problem.  Deep breathing and coughing will decrease your risk for a lung infection. Take a deep breath and hold it for as long as you can. Let the air out and then cough strongly. Deep breaths help open your airway. You may be given an incentive spirometer to help you take deep breaths. Put the plastic piece in your mouth and take a slow, deep breath. Then let the air out and cough. Repeat these steps 10 times every hour.  If you experience coughing, holding a pillow over your chest when you cough may help decrease the pain.  Call your doctor if you are coughing up blood, have chest pain, shortness of breath or a fever of 100.4. If you had a thoracentesis to drain the fluid, seek medical attention for any signs of infection, including increased pain, redness, or swelling, warmth, or foul-smelling drainage.  PLEASE follow-up with Pulmonology for further management of your condition.

## 2023-11-27 NOTE — DISCHARGE NOTE PROVIDER - NSDCCAREPROVSEEN_GEN_ALL_CORE_FT
J Luis, Richardson Madison, Milvia Garcia, Anca Liu, Norm Gillespie, Maricel Hernandez, Chapo Garcia, Yolie Buckley, Manuel Gilliland, Peter Licea, Heber Bangura, Kirill De Los Santos, [place]

## 2023-11-27 NOTE — PROGRESS NOTE ADULT - ASSESSMENT
Patient is an 89 y/o M with a PMH of HFpEF, chronic Afib (not compliant with Eliquis), CKD stage 3, and COPD (smokes 3 cigars daily) who presented to the ED BIBA on 11/22/23 with worsening shortness of breath that he noticed the morning of admission and increased bilateral leg swelling over the past week. He is currently admitted with acute hypoxic respiratory failure secondary to acute on chronic HFpEF exacerbation and large L sided pleural effusion. S/p thoracentesis 900ml on 11/24/23.     Discussed with attending, Dr. Gillespie   Patient is an 88 y/o M with a PMH of HFpEF, chronic Afib (not compliant with Eliquis), CKD stage 3, and COPD (smokes 3 cigars daily) who presented to the ED BIBA on 11/22/23 with worsening shortness of breath that he noticed the morning of admission and increased bilateral leg swelling over the past week. He is currently admitted with acute hypoxic respiratory failure secondary to acute on chronic HFpEF exacerbation and large L sided pleural effusion. S/p thoracentesis 900ml serosanguinous output on 11/24/23, discharge planning underway.     Discussed with attending, Dr. Gillespie   Patient is an 89 y/o M with a PMH of HFpEF, chronic Afib (not compliant with Eliquis), CKD stage 3, and COPD (smokes 3 cigars daily) who presented to the ED BIBA on 11/22/23 with worsening shortness of breath that he noticed the morning of admission and increased bilateral leg swelling over the past week. He is currently admitted with acute hypoxic respiratory failure secondary to acute on chronic HFpEF exacerbation and large L sided pleural effusion. S/p thoracentesis 900ml serosanguinous output on 11/24/23, fluid eval pending, discharge planning underway but still not medically optimized at this time.     Discussed with attending, Dr. Gillespie

## 2023-11-27 NOTE — PROGRESS NOTE ADULT - PROBLEM SELECTOR PLAN 8
CODE: reaffirmed code status today with patient and daughter at bedside, FULL CODE  HCP: daughter, Frida Michaellynda DVT ppx: On eliquis 2.5 mg BID

## 2023-11-27 NOTE — DISCHARGE NOTE NURSING/CASE MANAGEMENT/SOCIAL WORK - PATIENT PORTAL LINK FT
You can access the FollowMyHealth Patient Portal offered by [facility] by registering at the following website: [email or web address]. By joining [employer]’s FollowMyHealth portal, you will also be able to view your health information using other applications (apps) compatible with our system.

## 2023-11-29 NOTE — DISCHARGE NOTE NURSING/CASE MANAGEMENT/SOCIAL WORK - NSTRANSFERBELONGINGSDISPO_GEN_A_NUR
11/29/23, Patient admitted for chest pain. SW/CM met with patient in room to discuss transition of care/SW/CM role. Patient lives with daughter in a 2 story home with other residents in the home. Patient and daughter are looking to move to another home at the beginning of December. Patient says she will have a WC and BSC that she will need to  from a DME in [place] which daughter was going to do for patient. PCP is Dr. Alessandra Chandler and Pharmacy is [facility] on [address]. Recent PT was 16/24 and ambulated 40 feet. OT was 19/24. Psychiatry saw patient and adjustments with medications will be made. Patient does not meet inpatient criteria. Patient denied SI/HI with this worker. Per patient she will be going to [place] for OP services. Patient was given the contact information to have an assessment for services. Patient uses [facility] [address] [employer]. No other needs identified at this time. SW to follow. Case Management Assessment  Initial Evaluation    Date/Time of Evaluation: 11/29/2023 3:32 PM  Assessment Completed by: VIOLETTE Daniel    If patient is discharged prior to next notation, then this note serves as note for discharge by case management. Patient Name: Soraida Cronin                   YOB: 1966  Diagnosis: COPD exacerbation ([address]) [J44.1]                   Date / Time: 11/28/2023  6:18 PM    Patient Admission Status: Observation   Readmission Risk (Low < 19, Mod (19-27), High > 27): Readmission Risk Score: 18.5    Current PCP: Darylene Shillings, MD  PCP verified by CM? Yes    Chart Reviewed: Yes      History Provided by: Patient  Patient Orientation: Alert and Oriented    Patient Cognition: Alert    Hospitalization in the last 30 days (Readmission):  Yes    If yes, Readmission Assessment in  Navigator will be completed.     Advance Directives:      Code Status: Full Code   Patient's Primary Decision Maker is:        Discharge Planning:    Patient lives with: Family Members Type of Home: House  Primary Care Giver: Self  Patient Support Systems include: Children   Current Financial resources:    Current community resources:    Current services prior to admission: None            Current DME:              Type of Home Care services:  None    ADLS  Prior functional level: Independent in ADLs/IADLs  Current functional level: Independent in ADLs/IADLs    PT AM-PAC:   /24  OT AM-PAC: 23 /24    Family can provide assistance at DC: Yes  Would you like Case Management to discuss the discharge plan with any other family members/significant others, and if so, who? No  Plans to Return to Present Housing: Yes  Other Identified Issues/Barriers to RETURNING to current housing: N/A  Potential Assistance needed at discharge: N/A            Potential DME:    Patient expects to discharge to: [address] [place] [address] for transportation at discharge:      Financial    Payor: Karthik Rai / Plan: [email or web address] HMO / Product Type: Medicare /     Does insurance require precert for SNF: Yes    Potential assistance Purchasing Medications:    Meds-to-Beds request:        [address] [place] [address],[facility], [address] [place] [address] [address] [address]  Phone: [phone] Fax: [facility] #[id number] - [place] [address] [phone] Jenn Marcos [phone]  [address] [facility] [address]  Phone: [phone] Fax: [phone] [facility] #[phone] [place] [facility] [phone] [facility] [phone] Jenn Marcos [phone]  [facility] [address]  Phone: [phone] Fax: [phone]      Notes:    Factors facilitating achievement of predicted outcomes: Family support    Barriers to discharge: N/A    Additional Case Management Notes: See Above    The Plan for Transition of Care is related to the following treatment goals of COPD exacerbation ([address] [facility]) [J65.9]    IF APPLICABLE: The with patient

## 2023-12-18 NOTE — ED PROVIDER NOTE - CLINICAL SUMMARY MEDICAL DECISION MAKING FREE TEXT BOX
Differential includes but not limited to: CHF exacerbation  secondary to progression of CHF versus pneumonia.  Low suspicion for new onset renal or liver disease.    Plan to obtain ED chest pain labs with proBNP, RVP, will diurese with 80 Lasix anticipate admission

## 2023-12-18 NOTE — CONSULT NOTE ADULT - SUBJECTIVE AND OBJECTIVE BOX
JAYLON SINGLETON  [id number]      HPI:    Jaylon Singleton is an 89 year old man with past medical history of Atrial fibrillation (on Eliquis), HFrEF (LVEF 25-30% in 2019), Hypertension, Chronic kidney disease, COPD, history of CVA with recurrent hospitalizations for congestive heart failure likely from medication noncompliance now presents with progressive lower extremity edema and shortness of breath.       ALLERGIES:  No Known Allergies        CURRENT MEDICATIONS:  acetaminophen     Tablet .. 650 milliGRAM(s) Oral every 6 hours PRN  albuterol    90 MICROgram(s) HFA Inhaler 2 Puff(s) Inhalation every 6 hours PRN  allopurinol 100 milliGRAM(s) Oral daily  aspirin enteric coated 81 milliGRAM(s) Oral daily  atorvastatin 40 milliGRAM(s) Oral at bedtime  bacitracin   Ointment 1 Application(s) Topical daily  budesonide 160 MICROgram(s)/formoterol 4.5 MICROgram(s) Inhaler 2 Puff(s) Inhalation two times a day  furosemide   Injectable 40 milliGRAM(s) IV Push two times a day  heparin   Injectable 5000 Unit(s) SubCutaneous every 8 hours  hydrALAZINE 25 milliGRAM(s) Oral every 8 hours  metoprolol succinate ER 50 milliGRAM(s) Oral daily  pantoprazole    Tablet 40 milliGRAM(s) Oral before breakfast  polyethylene glycol 3350 17 Gram(s) Oral daily  senna 2 Tablet(s) Oral at bedtime  tiotropium 2.5 MICROgram(s) Inhaler 2 Puff(s) Inhalation daily        ROS:  All 10 systems reviewed and positives noted in HPI    OBJECTIVE:    VITAL SIGNS:  Vital Signs Last 24 Hrs  T(C): 36.4 (18 Dec 2023 16:14), Max: 36.5 (18 Dec 2023 09:52)  T(F): 97.6 (18 Dec 2023 16:14), Max: 97.7 (18 Dec 2023 09:52)  HR: 50 (18 Dec 2023 16:14) (50 - 97)  BP: 132/61 (18 Dec 2023 16:14) (106/63 - 132/61)  BP(mean): --  RR: 18 (18 Dec 2023 16:14) (16 - 22)  SpO2: 95% (18 Dec 2023 16:14) (94% - 97%)    Parameters below as of 18 Dec 2023 16:14  Patient On (Oxygen Delivery Method): room air        Physical Exam:   General: no acute distress  Neck: supple   CVS: JVP ~ 9 cm H20, irregularly irregular, s1, s2  Pulm: unlabored respirations, CTAB  Ext: mild lower extremity edema; wrapped   Neuro: awake, alert and oriented         LABS:                        9.9    7.90  )-----------( 160      ( 18 Dec 2023 10:15 )             31.5     12-18    147<H>  |  103  |  85<H>  ----------------------------<  126<H>  3.6   |  35<H>  |  1.90<H>    Ca    9.1      18 Dec 2023 10:15    TPro  7.6  /  Alb  2.9<L>  /  TBili  0.7  /  DBili  x   /  AST  22  /  ALT  17  /  AlkPhos  66  12-18        PT/INR - ( 18 Dec 2023 10:15 )   PT: 19.1 sec;   INR: 1.66 ratio         PTT - ( 18 Dec 2023 10:15 )  PTT:36.4 sec        TTE (10/2023):   1. Limited echocardiogram performed.   2. Low normal global left ventricular systolic function.   3. Left ventricular ejection fraction, by visual estimation, is 50%.   4. Normal right ventricular size and function.   5. Left atrial enlargement.   6. Right atrial enlargement.   7. Moderate thickening and calcification of the anterior and posterior mitral valve leaflets.   8. Moderately decreased mitral leaflet mobility.   9. Moderate mitral valve stenosis (mean gradient    7 mmHg at HR 77 BPM, MVA 1.1 cm^2).  10. Mild tricuspid regurgitation.  11. Large pleural effusion in the left lateral region.  12. Small pericardial effusion.      ECG (12/18/23): atrial fibrillation, nonspecific ST abnormalities    JAYLON SINGLETON  [id number]      HPI:    Jaylon Singleton is an 89 year old man with past medical history of Atrial fibrillation (on Eliquis), HFrEF (LVEF 25-30% in 2019), Hypertension, Chronic kidney disease, COPD, history of CVA with recurrent hospitalizations for congestive heart failure likely from medication noncompliance now presents with progressive lower extremity edema and shortness of breath.       ALLERGIES:  No Known Allergies        CURRENT MEDICATIONS:  acetaminophen     Tablet .. 650 milliGRAM(s) Oral every 6 hours PRN  albuterol    90 MICROgram(s) HFA Inhaler 2 Puff(s) Inhalation every 6 hours PRN  allopurinol 100 milliGRAM(s) Oral daily  aspirin enteric coated 81 milliGRAM(s) Oral daily  atorvastatin 40 milliGRAM(s) Oral at bedtime  bacitracin   Ointment 1 Application(s) Topical daily  budesonide 160 MICROgram(s)/formoterol 4.5 MICROgram(s) Inhaler 2 Puff(s) Inhalation two times a day  furosemide   Injectable 40 milliGRAM(s) IV Push two times a day  heparin   Injectable 5000 Unit(s) SubCutaneous every 8 hours  hydrALAZINE 25 milliGRAM(s) Oral every 8 hours  metoprolol succinate ER 50 milliGRAM(s) Oral daily  pantoprazole    Tablet 40 milliGRAM(s) Oral before breakfast  polyethylene glycol 3350 17 Gram(s) Oral daily  senna 2 Tablet(s) Oral at bedtime  tiotropium 2.5 MICROgram(s) Inhaler 2 Puff(s) Inhalation daily        ROS:  All 10 systems reviewed and positives noted in HPI    OBJECTIVE:    VITAL SIGNS:  Vital Signs Last 24 Hrs  T(C): 36.4 (18 Dec 2023 16:14), Max: 36.5 (18 Dec 2023 09:52)  T(F): 97.6 (18 Dec 2023 16:14), Max: 97.7 (18 Dec 2023 09:52)  HR: 50 (18 Dec 2023 16:14) (50 - 97)  BP: 132/61 (18 Dec 2023 16:14) (106/63 - 132/61)  BP(mean): --  RR: 18 (18 Dec 2023 16:14) (16 - 22)  SpO2: 95% (18 Dec 2023 16:14) (94% - 97%)    Parameters below as of 18 Dec 2023 16:14  Patient On (Oxygen Delivery Method): room air        Physical Exam:   General: no acute distress  Neck: supple   CVS: JVP ~ 9 cm H20, irregularly irregular, s1, s2  Pulm: unlabored respirations, CTAB  Ext: mild lower extremity edema; wrapped   Neuro: awake, alert and oriented         LABS:                        9.9    7.90  )-----------( 160      ( 18 Dec 2023 10:15 )             31.5     12-18    147<H>  |  103  |  85<H>  ----------------------------<  126<H>  3.6   |  35<H>  |  1.90<H>    Ca    9.1      18 Dec 2023 10:15    TPro  7.6  /  Alb  2.9<L>  /  TBili  0.7  /  DBili  x   /  AST  22  /  ALT  17  /  AlkPhos  66  12-18        PT/INR - ( 18 Dec 2023 10:15 )   PT: 19.1 sec;   INR: 1.66 ratio         PTT - ( 18 Dec 2023 10:15 )  PTT:36.4 sec        TTE (10/2023):   1. Limited echocardiogram performed.   2. Low normal global left ventricular systolic function.   3. Left ventricular ejection fraction, by visual estimation, is 50%.   4. Normal right ventricular size and function.   5. Left atrial enlargement.   6. Right atrial enlargement.   7. Moderate thickening and calcification of the anterior and posterior mitral valve leaflets.   8. Moderately decreased mitral leaflet mobility.   9. Moderate mitral valve stenosis (mean gradient    7 mmHg at HR 77 BPM, MVA 1.1 cm^2).  10. Mild tricuspid regurgitation.  11. Large pleural effusion in the left lateral region.  12. Small pericardial effusion.      ECG (12/18/23): atrial fibrillation, nonspecific ST abnormalities    JAYLON SINGLETON  [id number]      HPI:    Jaylon Singleton is an 89 year old man with past medical history of Atrial fibrillation (on Eliquis), HFrEF (LVEF 25-30% in 2019), Hypertension, Chronic kidney disease, COPD, history of CVA with recurrent hospitalizations for congestive heart failure likely from medication noncompliance now presents with progressive lower extremity edema and shortness of breath.       ALLERGIES:  No Known Allergies        CURRENT MEDICATIONS:  acetaminophen     Tablet .. 650 milliGRAM(s) Oral every 6 hours PRN  albuterol    90 MICROgram(s) HFA Inhaler 2 Puff(s) Inhalation every 6 hours PRN  allopurinol 100 milliGRAM(s) Oral daily  aspirin enteric coated 81 milliGRAM(s) Oral daily  atorvastatin 40 milliGRAM(s) Oral at bedtime  bacitracin   Ointment 1 Application(s) Topical daily  budesonide 160 MICROgram(s)/formoterol 4.5 MICROgram(s) Inhaler 2 Puff(s) Inhalation two times a day  furosemide   Injectable 40 milliGRAM(s) IV Push two times a day  heparin   Injectable 5000 Unit(s) SubCutaneous every 8 hours  hydrALAZINE 25 milliGRAM(s) Oral every 8 hours  metoprolol succinate ER 50 milliGRAM(s) Oral daily  pantoprazole    Tablet 40 milliGRAM(s) Oral before breakfast  polyethylene glycol 3350 17 Gram(s) Oral daily  senna 2 Tablet(s) Oral at bedtime  tiotropium 2.5 MICROgram(s) Inhaler 2 Puff(s) Inhalation daily        ROS:  All 10 systems reviewed and positives noted in HPI    OBJECTIVE:    VITAL SIGNS:  Vital Signs Last 24 Hrs  T(C): 36.4 (18 Dec 2023 16:14), Max: 36.5 (18 Dec 2023 09:52)  T(F): 97.6 (18 Dec 2023 16:14), Max: 97.7 (18 Dec 2023 09:52)  HR: 50 (18 Dec 2023 16:14) (50 - 97)  BP: 132/61 (18 Dec 2023 16:14) (106/63 - 132/61)  BP(mean): --  RR: 18 (18 Dec 2023 16:14) (16 - 22)  SpO2: 95% (18 Dec 2023 16:14) (94% - 97%)    Parameters below as of 18 Dec 2023 16:14  Patient On (Oxygen Delivery Method): room air        Physical Exam:   General: no acute distress  Neck: supple   CVS: JVP ~ 9 cm H20, irregularly irregular, s1, s2  Pulm: unlabored respirations, decreased breath sounds   Ext: 2+ b/l lower extremity edema with ulceration   Neuro: awake, alert and oriented         LABS:                        9.9    7.90  )-----------( 160      ( 18 Dec 2023 10:15 )             31.5     12-18    147<H>  |  103  |  85<H>  ----------------------------<  126<H>  3.6   |  35<H>  |  1.90<H>    Ca    9.1      18 Dec 2023 10:15    TPro  7.6  /  Alb  2.9<L>  /  TBili  0.7  /  DBili  x   /  AST  22  /  ALT  17  /  AlkPhos  66  12-18        PT/INR - ( 18 Dec 2023 10:15 )   PT: 19.1 sec;   INR: 1.66 ratio         PTT - ( 18 Dec 2023 10:15 )  PTT:36.4 sec        TTE (10/2023):   1. Limited echocardiogram performed.   2. Low normal global left ventricular systolic function.   3. Left ventricular ejection fraction, by visual estimation, is 50%.   4. Normal right ventricular size and function.   5. Left atrial enlargement.   6. Right atrial enlargement.   7. Moderate thickening and calcification of the anterior and posterior mitral valve leaflets.   8. Moderately decreased mitral leaflet mobility.   9. Moderate mitral valve stenosis (mean gradient    7 mmHg at HR 77 BPM, MVA 1.1 cm^2).  10. Mild tricuspid regurgitation.  11. Large pleural effusion in the left lateral region.  12. Small pericardial effusion.      ECG (12/18/23): atrial fibrillation, nonspecific ST abnormalities    JAYLON SINGLETON  [id number]      HPI:    Jaylon Singleton is an 89 year old man with past medical history of Atrial fibrillation (on Eliquis), HFrEF (LVEF 25-30% in 2019), Hypertension, Chronic kidney disease, COPD, history of CVA with recurrent hospitalizations for congestive heart failure likely from medication noncompliance now presents with progressive lower extremity edema and shortness of breath.       ALLERGIES:  No Known Allergies        CURRENT MEDICATIONS:  acetaminophen     Tablet .. 650 milliGRAM(s) Oral every 6 hours PRN  albuterol    90 MICROgram(s) HFA Inhaler 2 Puff(s) Inhalation every 6 hours PRN  allopurinol 100 milliGRAM(s) Oral daily  aspirin enteric coated 81 milliGRAM(s) Oral daily  atorvastatin 40 milliGRAM(s) Oral at bedtime  bacitracin   Ointment 1 Application(s) Topical daily  budesonide 160 MICROgram(s)/formoterol 4.5 MICROgram(s) Inhaler 2 Puff(s) Inhalation two times a day  furosemide   Injectable 40 milliGRAM(s) IV Push two times a day  heparin   Injectable 5000 Unit(s) SubCutaneous every 8 hours  hydrALAZINE 25 milliGRAM(s) Oral every 8 hours  metoprolol succinate ER 50 milliGRAM(s) Oral daily  pantoprazole    Tablet 40 milliGRAM(s) Oral before breakfast  polyethylene glycol 3350 17 Gram(s) Oral daily  senna 2 Tablet(s) Oral at bedtime  tiotropium 2.5 MICROgram(s) Inhaler 2 Puff(s) Inhalation daily        ROS:  All 10 systems reviewed and positives noted in HPI    OBJECTIVE:    VITAL SIGNS:  Vital Signs Last 24 Hrs  T(C): 36.4 (18 Dec 2023 16:14), Max: 36.5 (18 Dec 2023 09:52)  T(F): 97.6 (18 Dec 2023 16:14), Max: 97.7 (18 Dec 2023 09:52)  HR: 50 (18 Dec 2023 16:14) (50 - 97)  BP: 132/61 (18 Dec 2023 16:14) (106/63 - 132/61)  BP(mean): --  RR: 18 (18 Dec 2023 16:14) (16 - 22)  SpO2: 95% (18 Dec 2023 16:14) (94% - 97%)    Parameters below as of 18 Dec 2023 16:14  Patient On (Oxygen Delivery Method): room air        Physical Exam:   General: no acute distress  Neck: supple   CVS: JVP ~ 9 cm H20, irregularly irregular, s1, s2  Pulm: unlabored respirations, decreased breath sounds   Ext: 2+ b/l lower extremity edema with ulceration   Neuro: awake, alert and oriented         LABS:                        9.9    7.90  )-----------( 160      ( 18 Dec 2023 10:15 )             31.5     12-18    147<H>  |  103  |  85<H>  ----------------------------<  126<H>  3.6   |  35<H>  |  1.90<H>    Ca    9.1      18 Dec 2023 10:15    TPro  7.6  /  Alb  2.9<L>  /  TBili  0.7  /  DBili  x   /  AST  22  /  ALT  17  /  AlkPhos  66  12-18        PT/INR - ( 18 Dec 2023 10:15 )   PT: 19.1 sec;   INR: 1.66 ratio         PTT - ( 18 Dec 2023 10:15 )  PTT:36.4 sec        TTE (10/2023):   1. Limited echocardiogram performed.   2. Low normal global left ventricular systolic function.   3. Left ventricular ejection fraction, by visual estimation, is 50%.   4. Normal right ventricular size and function.   5. Left atrial enlargement.   6. Right atrial enlargement.   7. Moderate thickening and calcification of the anterior and posterior mitral valve leaflets.   8. Moderately decreased mitral leaflet mobility.   9. Moderate mitral valve stenosis (mean gradient    7 mmHg at HR 77 BPM, MVA 1.1 cm^2).  10. Mild tricuspid regurgitation.  11. Large pleural effusion in the left lateral region.  12. Small pericardial effusion.      ECG (12/18/23): atrial fibrillation, nonspecific ST abnormalities

## 2023-12-18 NOTE — PATIENT PROFILE ADULT - FUNCTIONAL ASSESSMENT - BASIC MOBILITY 6.
3-calculated by average/Not able to assess (calculate score using [facility] averaging method)  3-calculated by average/Not able to assess (calculate score using [facility] averaging method)

## 2023-12-18 NOTE — H&P ADULT - PROBLEM SELECTOR PLAN 1
-patient clinically overloaded with significant LE edema b/l, left sided effusion and elevated pbnp  -TTE with HFpEF, suspect non compliance with meds and diet at home. Need to obtain collateral from family, patient is poor historian  -Home regimen is lasix 40BID and metolazone  2x/week  -start IV lasix 40BID  -strict I/os, daily weights, fluid restriction and low salt diet  -monitor on tele  -cards evaluation -patient clinically overloaded with significant LE edema b/l, left sided effusion and elevated pbnp  -TTE with HFpEF, suspect non compliance with meds and diet at home. Need to obtain collateral from family, patient is poor historian  -Home regimen is torsemide 100 daily per PCp Dr. Rubio  -start IV lasix 40BID  -strict I/os, daily weights, fluid restriction and low salt diet  -monitor on tele  -cards evaluation

## 2023-12-18 NOTE — ED ADULT NURSE NOTE - NSFALLUNIVINTERV_ED_ALL_ED
Bed/Stretcher in lowest position, wheels locked, appropriate side rails in place/Call bell, personal items and telephone in reach/Instruct patient to call for assistance before getting out of bed/chair/stretcher/Non-slip footwear applied when patient is off stretcher/[place] to call system/Physically safe environment - no spills, clutter or unnecessary equipment/Purposeful proactive rounding/Room/bathroom lighting operational, light cord in reach Bed/Stretcher in lowest position, wheels locked, appropriate side rails in place/Call bell, personal items and telephone in reach/Instruct patient to call for assistance before getting out of bed/chair/stretcher/Non-slip footwear applied when patient is off stretcher/[place] to call system/Physically safe environment - no spills, clutter or unnecessary equipment/Purposeful proactive rounding/Room/bathroom lighting operational, light cord in reach

## 2023-12-18 NOTE — ED PROVIDER NOTE - PHYSICAL EXAMINATION
GEN: Patient awake alert NAD.   HEENT: normocephalic, atraumatic, EOMI, no scleral icterus  CARDIAC: RRR, S1, S2, no murmur.   PULM: +diminished breath sounds left base, right crackles.   ABD: soft NT, ND,  + fluid wave.   MSK: Moving all extremities, 5/5 strength and full ROM in all extremities.     NEURO: A&Ox3, gait normal, no focal neurological deficits, CN 2-12 grossly intact  SKIN: lower extremity pitting edema to knees,  Large bullae seen on right distal leg.

## 2023-12-18 NOTE — H&P ADULT - PROBLEM SELECTOR PLAN 8
-on hydralazine 25TID, toprol 50mg and lasix 40BID  -resume above regimen -on hydralazine 25TID, toprol 50mg and torsemide   -resume above regimen

## 2023-12-18 NOTE — H&P ADULT - HISTORY OF PRESENT ILLNESS
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 Patient with recent hospitalization for CHF exacerbation 11/22-11/27 and he left AMA. Patient is a poor historian and unable to give reliable history. Denies any chest pain, palpitations, N/V, HA, dizziness. Reports he takes his medications most of the time. He is concerned about blisters on his leg but unable to recognize HF exacerbation being the underlying issue. Also reports constipation but denies N/V/D. Reports lack of appetite but denies dysphagia. Says he has an appointment with his PCP today but he has not seen him since recent discharge.     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 Patient with recent hospitalization for CHF exacerbation 11/22-11/27 and he left AMA. Patient is a poor historian and unable to give reliable history. Denies any chest pain, palpitations, N/V, HA, dizziness. Reports he takes his medications most of the time. He is concerned about blisters on his leg but unable to recognize HF exacerbation being the underlying issue. Also reports constipation but denies N/V/D. Reports lack of appetite but denies dysphagia.     Collateral obtained from PCP Dr. Rubio and daughter Frida. PCP reports he stopped lasix and started torsemide 100mg for fluid overload. He is concerned that patient is non compliant with recommendations and medications. Per daughter patient is in sound mind and does not stay in the hospital to complete treatment and signs out AMA. As far as she knows, patient has been compliant with medications. He is still ambulatory and goes to work daily.     Vital Signs Last 24 Hrs  T(C): 36.5 (18 Dec 2023 09:52), Max: 36.5 (18 Dec 2023 09:52)  T(F): 97.7 (18 Dec 2023 09:52), Max: 97.7 (18 Dec 2023 09:52)  HR: 76 (18 Dec 2023 12:37) (76 - 97)  BP: 125/79 (18 Dec 2023 12:37) (122/78 - 130/95)  BP(mean): --  RR: 18 (18 Dec 2023 12:37) (18 - 22)  SpO2: 95% (18 Dec 2023 12:37) (94% - 97%)    Parameters below as of 18 Dec 2023 12:37  Patient On (Oxygen Delivery Method): room air

## 2023-12-18 NOTE — CONSULT NOTE ADULT - ASSESSMENT
Assessment:  Jaylon Antony is an 89 year old man with past medical history of Atrial fibrillation (on Eliquis), HFrEF (LVEF 25-30% in 2019), Hypertension, Chronic kidney disease, COPD, history of CVA with recurrent hospitalizations for congestive heart failure likely from medication noncompliance now presents with progressive lower extremity edema and shortness of breath, suggestive of acute on chronic diastolic heart failure exacerbation secondary to medication noncompliance.      pro bnp elevated but less tahn oprior   ECG on admission consistent with atrial fibrillation, similar to prior ECG. Troponin not elevated and patient denies angina, does not appear to be an acute coronary syndrome. Pro BNP elevated. Recent echo consistent with low normal LVEF 50% and moderate mitral stenosis. CT chest consistent with large left pleural effusion.    Recommendations:  [] HFpEF, Moderate mitral stenosis: Likely secondary to component of noncompliance. S/p IV diuresis, appears near euvolemic, saturating well on room air. Continue Lasix 40 mg PO BID with metolazone 5 mg twice weekly. Resume Farxiga if ok with Nephrology given CKD. Patient will require close outpatient follow up with his cardiologist to prevent re-admissions to hospital and should comply with medications he is discharged on. Recommend 1 L daily fluid restriction. Plan for outpatient surveillance echo imaging of mitral stenosis.  [] Left pleural effusion s/p thoracentesis: Follow up cytology and fluid studies per Pulmonary   [] Atrial fibrillation: Continue beta blocker, continue Eliquis for stroke prophylaxis    The patient is CV stable for discharge home today with close follow up with his cardiologist in one week. Discussed with primary team.    Kevin Hong MD  Cardiology      Assessment:  Jaylon Antony is an 89 year old man with past medical history of Atrial fibrillation (on Eliquis), HFrEF (LVEF 25-30% in 2019), Hypertension, Chronic kidney disease, COPD, history of CVA with recurrent hospitalizations for congestive heart failure likely from medication noncompliance now presents with progressive lower extremity edema and shortness of breath, suggestive of acute on chronic diastolic heart failure exacerbation likely secondary to medication noncompliance.    ECG consistent with atrial fibrillation and nonspecific ST abnormalities. Troponins are not elevated in range that would suggest an acute coronary syndrome. Pro BNP elevated but less than prior CHF hospitalization. Recent echo consistent with low normal LVEF 50% and moderate mitral stenosis. Chest xray with increasing left effusion. The patient is not able to explain what medications he takes and there is concern for medication noncompliance.     Recommendations:  [] HFpEF, Moderate mitral stenosis: Likely secondary to component of noncompliance. Recommend Lasix 40 mg IVP BID (patient is supposed to take Lasix 40 mg PO BID at home with Metolazone twice weekly) monitor renal function. Consider Pulmonary evaluation to determine if thoracentesis is needed. If renal function stabilizes then may consider SGLT2-I this admission. Plan for repeat limited echo to re-evaluate LVEF. Follow up echo to re-evaluate mitral stenosis severity.   [] Atrial fibrillation: Continue beta blocker, continue Eliquis for stroke prophylaxis    We will continue to follow along.     Kevin Hong MD  Cardiology

## 2023-12-18 NOTE — ED ADULT TRIAGE NOTE - CHIEF COMPLAINT QUOTE
Pt BIB dtr for shortness of breath.  Pt has history of CHF, carlos leg edema, O2 sat on room air 94%.

## 2023-12-18 NOTE — ED PROVIDER NOTE - OBJECTIVE STATEMENT
89-year-old male past medical history of CHF COPD CVA, hypertension and A-fib, presents with increased fluid in the legs and shortness of breath for the past several days.  In the ED patient is 89% on room air, bilateral lower extremity edema with bullae patient takes furosemide says that he took "an extra dose last night because his primary care doctor recommended.

## 2023-12-18 NOTE — H&P ADULT - PROBLEM SELECTOR PLAN 9
-eliquis on hold for potential procedural intervention (thoracentesis)   -HSQ for now -eliquis on hold for potential procedural intervention (thoracentesis)   -HSQ for now    Left message with daughter Frida to obtain collateral. Awaiting call back. -eliquis on hold for potential procedural intervention (thoracentesis)   -HSQ for now    Case discussed with PCP Dr. Rubio and daughter Frida

## 2023-12-18 NOTE — PATIENT PROFILE ADULT - FALL HARM RISK - HARM RISK INTERVENTIONS
Assistance OOB with selected safe patient handling equipment/Communicate Risk of Fall with Harm to all staff/Discuss with provider need for PT consult/Monitor gait and stability/Provide patient with walking aids - walker, cane, crutches/Reinforce activity limits and safety measures with patient and family/Tailored Fall Risk Interventions/Visual Cue: Yellow wristband and red socks/Bed in lowest position, wheels locked, appropriate side rails in place/Call bell, personal items and telephone in reach/Instruct patient to call for assistance before getting out of bed or chair/Non-slip footwear when patient is out of bed/[place] to call system/Physically safe environment - no spills, clutter or unnecessary equipment/Purposeful Proactive Rounding/Room/bathroom lighting operational, light cord in reach Assistance OOB with selected safe patient handling equipment/Communicate Risk of Fall with Harm to all staff/Discuss with provider need for PT consult/Monitor gait and stability/Provide patient with walking aids - walker, cane, crutches/Reinforce activity limits and safety measures with patient and family/Tailored Fall Risk Interventions/Visual Cue: Yellow wristband and red socks/Bed in lowest position, wheels locked, appropriate side rails in place/Call bell, personal items and telephone in reach/Instruct patient to call for assistance before getting out of bed or chair/Non-slip footwear when patient is out of bed/[place] to call system/Physically safe environment - no spills, clutter or unnecessary equipment/Purposeful Proactive Rounding/Room/bathroom lighting operational, light cord in reach

## 2023-12-18 NOTE — H&P ADULT - PROBLEM SELECTOR PLAN 4
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

## 2023-12-18 NOTE — ED ADULT NURSE REASSESSMENT NOTE - NS ED NURSE REASSESS COMMENT FT1
pt requesting recliner chair, states he can not stay in the stretcher. posey placed on chair and family member at bedside

## 2023-12-18 NOTE — H&P ADULT - ASSESSMENT
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

## 2023-12-18 NOTE — H&P ADULT - PROBLEM SELECTOR PLAN 5
-not in exacerbation  -given COPD, [id number]% on greater acceptable. Avoid hyperoxygenation   -c/w spiriva, Symbicort and albuterol PRN -not in exacerbation  -given COPD, [id number]% on greater acceptable. Avoid hyperoxygenation   -c/w spiriva, Symbicort and albuterol PRN

## 2023-12-19 NOTE — PROGRESS NOTE ADULT - PROBLEM SELECTOR PLAN 5
-not in exacerbation  -given COPD, spo2 90% on greater acceptable. Avoid hyperoxygenation   -c/w spiriva, Symbicort and albuterol PRN

## 2023-12-19 NOTE — CONSULT NOTE ADULT - ASSESSMENT
Assessment  ERV  Dyspnea suspect due to acute on chronic Diastolic CHF  Chronic Left pleural effusions  s/p thoracentesis 11/24/23- Exudate     Afib on a/c  Cigar smoker, at risk for copd.   Worsening renal function on CKD    Plan  pleural effusion recurrent  unsure if primary cause of SOB, but as fluid was previously exudative with h/o pipe smoking and patient off a/c      offered thoracentesis and patient agreeable - risks benefits discussed and he states will consent.      previous cytology was negative.   restart a/c after procedure  n/c o2 to maintain sat    monitor on room air  out of bed as tolerated  optimize cardiac meds as per Cardio  Rest of care as per primary team.    consider palliative care eval.

## 2023-12-19 NOTE — DIETITIAN INITIAL EVALUATION ADULT - ENERGY INTAKE
Other (Free Text): Mohs 9/2014 Detail Level: Detailed Note Text (......Xxx Chief Complaint.): This diagnosis correlates with the Other (Free Text): Treated with LN2, 3/2017 Other (Free Text): Exc, 8/2018 Other (Free Text): Mohs 6/2015 Poor (<50%)

## 2023-12-19 NOTE — ADVANCED PRACTICE NURSE CONSULT - ASSESSMENT
Patient seen earlier today in room, had been ambulating in room, assisted to recliner chair.  Admitted for CHF exacerbation, bilateral lower extremity swelling/edema.  Presents with 3+ pitting edema bilaterally, appears SOB.  The left lower extremity, below the knee has numerous very small (<1 cm) flat, serous filled blisters.  The right lower extremity with the same, but also noted are three large (~3 x 3 cm) serous filled blister, and one deroofed blister.

## 2023-12-19 NOTE — DIETITIAN NUTRITION RISK NOTIFICATION - ADDITIONAL COMMENTS/DIETITIAN RECOMMENDATIONS
Suggest to add MVI, Vit C, Vit B12   Honor pt's food preferences as feasible with prescribed diet.   Obtain and record PO intake and weights daily   Continue with Therapeutic low sodium Diet education at f/u interview. 
2

## 2023-12-19 NOTE — DIETITIAN INITIAL EVALUATION ADULT - PROBLEM SELECTOR PLAN 2
-worsening left sided effusion  -s/p thoracentesis with 1 L removal during last hospitalization  -cytopath negative for malignancy. Suspect 2/2 HF exacerbation  -recommend aggressive diuresis, if respiratory distress - may need therapeutic thoracentesis

## 2023-12-19 NOTE — PHYSICAL THERAPY INITIAL EVALUATION ADULT - PERTINENT HX OF CURRENT PROBLEM, REHAB EVAL
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 Patient with recent hospitalization for CHF exacerbation 11/22-11/27 and he left AMA. Patient is a poor historian and unable to give reliable history. Denies any chest pain, palpitations, N/V, HA, dizziness. Reports he takes his medications most of the time. He is concerned about blisters on his leg but unable to recognize HF exacerbation being the underlying issue. Also reports constipation but denies N/V/D. Reports lack of appetite but denies dysphagia.

## 2023-12-19 NOTE — DIETITIAN INITIAL EVALUATION ADULT - PROBLEM SELECTOR PLAN 1
-patient clinically overloaded with significant LE edema b/l, left sided effusion and elevated pbnp  -TTE with HFpEF, suspect non compliance with meds and diet at home. Need to obtain collateral from family, patient is poor historian  -Home regimen is torsemide 100 daily per PCp Dr. Rubio  -start IV lasix 40BID  -strict I/os, daily weights, fluid restriction and low salt diet  -monitor on tele  -cards evaluation

## 2023-12-19 NOTE — DIETITIAN INITIAL EVALUATION ADULT - PROBLEM SELECTOR PLAN 9
-eliquis on hold for potential procedural intervention (thoracentesis)   -HSQ for now    Case discussed with PCP Dr. Rubio and daughter Frida

## 2023-12-19 NOTE — PROGRESS NOTE ADULT - PROBLEM SELECTOR PLAN 4
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

## 2023-12-19 NOTE — DIETITIAN INITIAL EVALUATION ADULT - ORAL INTAKE PTA/DIET HISTORY
Per [facility] to send patient home with -119  Current   Pt reported PTA consumed a regular diet. Pt states has poor appetite. Pt owns a restaurant, eats in the morning a yogurt, a fruit, and Ensure 1 can, usually skips lunch, rarely eats at dinner due to poor appetite.

## 2023-12-19 NOTE — DIETITIAN INITIAL EVALUATION ADULT - PERTINENT MEDS FT
MEDICATIONS  (STANDING):  allopurinol 100 milliGRAM(s) Oral daily  aspirin enteric coated 81 milliGRAM(s) Oral daily  atorvastatin 40 milliGRAM(s) Oral at bedtime  bacitracin   Ointment 1 Application(s) Topical daily  budesonide 160 MICROgram(s)/formoterol 4.5 MICROgram(s) Inhaler 2 Puff(s) Inhalation two times a day  furosemide   Injectable 40 milliGRAM(s) IV Push two times a day  heparin   Injectable 5000 Unit(s) SubCutaneous every 8 hours  hydrALAZINE 25 milliGRAM(s) Oral every 8 hours  melatonin 3 milliGRAM(s) Oral at bedtime  metoprolol succinate ER 50 milliGRAM(s) Oral daily  pantoprazole    Tablet 40 milliGRAM(s) Oral before breakfast  polyethylene glycol 3350 17 Gram(s) Oral two times a day  potassium chloride   Powder 40 milliEquivalent(s) Oral once  senna 2 Tablet(s) Oral at bedtime  tiotropium 2.5 MICROgram(s) Inhaler 2 Puff(s) Inhalation daily    MEDICATIONS  (PRN):  acetaminophen     Tablet .. 650 milliGRAM(s) Oral every 6 hours PRN Temp greater or equal to 38C (100.4F), Mild Pain (1 - 3)  albuterol    90 MICROgram(s) HFA Inhaler 2 Puff(s) Inhalation every 6 hours PRN Shortness of Breath and/or Wheezing

## 2023-12-19 NOTE — PROGRESS NOTE ADULT - CONVERSATION DETAILS
Spoke to daughter Frida about patient's recurrent admissions for CHF due to non-compliance. Explained his severity of illness and concern for potential decompensation. Discussed advance directives. Per daughter patient wishes to be FULL code based on previous conversations. I encouraged family to discuss GOC again with patient in light of recurrent admissions and declining clinical condition. Family understands patient's poor prognosis. Will continue to address GOC with family and patient.

## 2023-12-19 NOTE — DIETITIAN NUTRITION RISK NOTIFICATION - WEIGHT LOSS
"Spoke w/ pt's dtr, Parisa. Pt went to ED on Sat. Pt is having N/V, "cannot hold anything down". Not drinking any water, comes right back up. She is not urinating. Was rx'd Zofran. Helped at first. Not working now. Advised her to take pt back to ED as she may need extra fluids. She agreed and will f/u PRN  "
----- Message from Tobias Barillas sent at 8/29/2022  1:56 PM CDT -----  Contact: self  Patient's daughter is requesting tp speak to the office about her mother's hospital visit and wants to make an appointment. [phone].    
> 7.5% in 3 months

## 2023-12-19 NOTE — PHYSICAL THERAPY INITIAL EVALUATION ADULT - GENERAL OBSERVATIONS, REHAB EVAL
pt sitting at edge of bed, no c/c, a+ox4, vss per tele pt sitting at edge of bed, no c/c, a+ox4, vss per tele, bilat LE edema & redness

## 2023-12-19 NOTE — DIETITIAN NUTRITION RISK NOTIFICATION - TREATMENT: THE FOLLOWING DIET HAS BEEN RECOMMENDED
Low Sodium  Piero(7 Gm Arginine/7 Gm Glut/1.2 Gm HMB     Qty per Day:  2  Supplement Feeding Modality:  Oral  Ensure Plus High Protein Cans or Servings Per Day:  1       Frequency:  Daily (12-19-23 @ 11:11) [Pending Verification By Attending]  1000mL Fluid Restriction ([id number]) (12-18-23 @ 15:04) [Active]       Low Sodium  Piero(7 Gm Arginine/7 Gm Glut/1.2 Gm HMB     Qty per Day:  2  Supplement Feeding Modality:  Oral  Ensure Plus High Protein Cans or Servings Per Day:  1       Frequency:  Daily (12-19-23 @ 11:11) [Pending Verification By Attending]  1000mL Fluid Restriction ([id number]) (12-18-23 @ 15:04) [Active]

## 2023-12-19 NOTE — DIETITIAN INITIAL EVALUATION ADULT - PERTINENT LABORATORY DATA
12-19    145  |  104  |  88<H>  ----------------------------<  145<H>  3.3<L>   |  33<H>  |  1.92<H>    Ca    9.0      19 Dec 2023 06:09  Phos  4.3     12-19  Mg     2.4     12-19    TPro  7.6  /  Alb  2.9<L>  /  TBili  0.7  /  DBili  x   /  AST  22  /  ALT  17  /  AlkPhos  66  12-18  A1C with Estimated Average Glucose Result: 5.8 % (09-20-23 @ 09:53)  A1C with Estimated Average Glucose Result: 5.8 % (09-19-23 @ 07:28)

## 2023-12-19 NOTE — DIETITIAN INITIAL EVALUATION ADULT - NS FNS DIET ORDER
Diet, Regular:   DASH/TLC {Sodium & Cholesterol Restricted}  1000mL Fluid Restriction ([id number]) (12-18-23 @ 15:04)   Diet, Regular:   DASH/TLC {Sodium & Cholesterol Restricted}  1000mL Fluid Restriction ([id number]) (12-18-23 @ 15:04)

## 2023-12-19 NOTE — PROGRESS NOTE ADULT - PROBLEM SELECTOR PLAN 1
-patient clinically overloaded with significant LE edema b/l, left sided effusion and elevated pbnp  -TTE with HFpEF, suspect non compliance with meds and diet at home.   -Home regimen is torsemide 100 daily per PCp Dr. Rubio  -C/w IV lasix 40BID  -strict I/os, daily weights, fluid restriction and low salt diet  -monitor on tele  -Case discussed with cards Dr. Hong today - c/w IV diuretics

## 2023-12-19 NOTE — PROGRESS NOTE ADULT - ASSESSMENT
Assessment:  Jaylon Antony is an 89 year old man with past medical history of Atrial fibrillation (on Eliquis), HFrEF (LVEF 25-30% in 2019), Hypertension, Chronic kidney disease, COPD, history of CVA with recurrent hospitalizations for congestive heart failure likely from medication noncompliance now presents with progressive lower extremity edema and shortness of breath, suggestive of acute on chronic diastolic heart failure exacerbation.     ECG consistent with atrial fibrillation and nonspecific ST abnormalities. Troponins are not elevated in range that would suggest an acute coronary syndrome. Pro BNP elevated but less than prior CHF hospitalization. Recent echo consistent with low normal LVEF 50% and moderate mitral stenosis. Chest xray with increasing left effusion. The patient is not able to explain what medications he takes and there is concern for medication noncompliance.     Recommendations:  [] HFpEF, Moderate mitral stenosis: Likely secondary to component of noncompliance. Continue Lasix 40 mg IVP BID (primary team patient is now on Torsemide at home). Monitor renal function. Consider Pulmonary evaluation to determine if thoracentesis is needed. If renal function stabilizes then may consider SGLT2-I this admission. Plan for repeat limited echo to re-evaluate LVEF. Follow up echo to re-evaluate mitral stenosis severity.   [] Atrial fibrillation: Continue beta blocker, continue Eliquis for stroke prophylaxis    Discussed with Dr. Earl. We will continue to follow along.     Kevin Hong MD  Cardiology

## 2023-12-19 NOTE — CONSULT NOTE ADULT - SUBJECTIVE AND OBJECTIVE BOX
Who is calling: Patient   Reason for call: refill request- semaglutide-Weight Management (WEGOVY) 0.5 MG/0.5ML injection  Explanation/Symptoms: Patient calling in. Patient was approve for the medication she just needed a new refill request put in the last one had got canceled. 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   Patient with recent hospitalization for CHF exacerbation 11/22-11/27 and he left AMA. Patient is a poor historian and unable to give reliable history. Denies any chest pain, palpitations, N/V, HA, dizziness. Reports he takes his medications most of the time. He is concerned about blisters on his leg but unable to recognize HF exacerbation being the underlying issue. Also reports constipation but denies N/V/D. Reports lack of appetite but denies dysphagia.     Collateral obtained from PCP Dr. Rubio and daughter Frida. PCP reports he stopped lasix and started torsemide 100mg for fluid overload. He is concerned that patient is non compliant with recommendations and medications. Per daughter patient is in sound mind and does not stay in the hospital to complete treatment and signs out AMA. As far as she knows, patient has been compliant with medications. He is still ambulatory and goes to work daily.    (18 Dec 2023 14:27)      BRIEF HOSPITAL COURSE:   Patient admitted to medical floor noted to have increasing left sided effusion compared to discharge last month.  found to have ERV +   patient has no cough  + SOB  + Edema   + HER  no cp, palp, n/v    PAST MEDICAL & SURGICAL HISTORY:  Afib  Congestive heart failure (CHF)  CVA (cerebral vascular accident)  Hypertension, unspecified type  Chronic obstructive pulmonary disease (COPD)  No significant past surgical history        Allergies    No Known Allergies    Intolerances      FAMILY HISTORY:    Social history:    Lives with daughters (18 Dec 2023 14:27)     Smoking: Pipe smoker      Review of Systems: as stated above, limited historian     CONSTITUTIONAL: No fever, No chills, + fatigue  EYES: No eye pain, No visual disturbances, No discharge  ENMT:  No difficulty hearing, No tinnitus, No vertigo; No sinus or throat pain  NECK: No pain, No stiffness  RESPIRATORY: + Cough, No SOB, No Secretions  CARDIOVASCULAR: No chest pain, No palpitations, No dizziness, or No leg swelling  GASTROINTESTINAL: No abdominal or epigastric pain. No nausea, No vomiting, No hematemesis; No diarrhea, No constipation. No melena, No hematochezia.  GENITOURINARY: No dysuria, No frequency, No hematuria, No incontinence    PSYCHIATRIC: No depression, No anxiety, No mood swings, No difficulty sleeping      Medications:  MEDICATIONS  (STANDING):  allopurinol 100 milliGRAM(s) Oral daily  aspirin enteric coated 81 milliGRAM(s) Oral daily  atorvastatin 40 milliGRAM(s) Oral at bedtime  bacitracin   Ointment 1 Application(s) Topical daily  budesonide 160 MICROgram(s)/formoterol 4.5 MICROgram(s) Inhaler 2 Puff(s) Inhalation two times a day  furosemide   Injectable 40 milliGRAM(s) IV Push two times a day  heparin   Injectable 5000 Unit(s) SubCutaneous every 8 hours  hydrALAZINE 25 milliGRAM(s) Oral every 8 hours  melatonin 3 milliGRAM(s) Oral at bedtime  metoprolol succinate ER 50 milliGRAM(s) Oral daily  pantoprazole    Tablet 40 milliGRAM(s) Oral before breakfast  polyethylene glycol 3350 17 Gram(s) Oral two times a day  senna 2 Tablet(s) Oral at bedtime  tiotropium 2.5 MICROgram(s) Inhaler 2 Puff(s) Inhalation daily    MEDICATIONS  (PRN):  acetaminophen     Tablet .. 650 milliGRAM(s) Oral every 6 hours PRN Temp greater or equal to 38C (100.4F), Mild Pain (1 - 3)  albuterol    90 MICROgram(s) HFA Inhaler 2 Puff(s) Inhalation every 6 hours PRN Shortness of Breath and/or Wheezing      Antibiotics History      Heme Medications   aspirin enteric coated 81 milliGRAM(s) Oral daily, 12-18-23 @ 14:40  heparin   Injectable 5000 Unit(s) SubCutaneous every 8 hours, 12-18-23 @ 14:58      GI Medications  pantoprazole    Tablet 40 milliGRAM(s) Oral before breakfast, 12-18-23 @ 14:42, Routine  polyethylene glycol 3350 17 Gram(s) Oral two times a day, 12-19-23 @ 08:53,   senna 2 Tablet(s) Oral at bedtime, 12-18-23 @ 14:17, Routine        Home Medications:  Last Order Reconciliation Date: 12-18-23 @ 17:07 (Admission Reconciliation)  albuterol 90 mcg/inh inhalation aerosol: 2 puff(s) inhaled every 6 hours (12-18-23 @ 14:39)  allopurinol 100 mg oral tablet: 1 tab(s) orally once a day (12-18-23 @ 14:39)  apixaban 2.5 mg oral tablet: 1 tab(s) orally every 12 hours (12-18-23 @ 14:39)  aspirin 81 mg oral delayed release tablet: 1 tab(s) orally once a day (12-18-23 @ 14:39)  atorvastatin 40 mg oral tablet: 1 tab(s) orally once a day (at bedtime) (12-18-23 @ 14:39)  budesonide-formoterol 160 mcg-4.5 mcg/inh inhalation aerosol: 2 puff(s) inhaled 2 times a day (12-18-23 @ 14:39)  hydrALAZINE 25 mg oral tablet: 1 tab(s) orally every 8 hours (12-18-23 @ 14:39)  metoprolol succinate 50 mg oral tablet, extended release: 1 tab(s) orally once a day (12-18-23 @ 14:39)  Multiple Vitamins oral tablet: 1 tab(s) orally once a day (12-18-23 @ 14:39)  pantoprazole 40 mg oral delayed release tablet: 1 tab(s) orally once a day (before a meal) (12-18-23 @ 14:39)  tiotropium 2.5 mcg/inh inhalation aerosol: 2 puff(s) inhaled once a day (12-18-23 @ 14:39)  torsemide 100 mg oral tablet: 1 tab(s) orally once a day (12-18-23 @ 17:07)      LABS:                        9.2    7.81  )-----------( 161      ( 19 Dec 2023 06:09 )             28.8     12-19    145  |  104  |  88<H>  ----------------------------<  145<H>  3.3<L>   |  33<H>  |  1.92<H>    Ca    9.0      19 Dec 2023 06:09  Phos  4.3     12-19  Mg     2.4     12-19    TPro  7.6  /  Alb  2.9<L>  /  TBili  0.7  /  DBili  x   /  AST  22  /  ALT  17  /  AlkPhos  66  12-18    Entero/Rhinovirus (RapRVP): Detected (12.18.23 @ 10:15)    Trend Bun/Cr  12-19-23 @ 06:09  BUN/CR -  88<H> / 1.92<H>  12-18-23 @ 10:15  BUN/CR -  85<H> / 1.90<H>  11-27-23 @ 06:42  BUN/CR -  50<H> / 1.73<H>  11-26-23 @ 07:00  BUN/CR -  50<H> / 1.65<H>  11-25-23 @ 05:45  BUN/CR -  52<H> / 1.71<H>  11-24-23 @ 06:55  BUN/CR -  56<H> / 1.67<H>  11-23-23 @ 07:03  BUN/CR -  53<H> / 1.66<H>  11-22-23 @ 13:35  BUN/CR -  49<H> / 1.44<H>  10-16-23 @ 07:34  BUN/CR -  39<H> / 1.46<H>  10-15-23 @ 22:30  BUN/CR -  39<H> / 1.50<H>  09-25-23 @ 06:24  BUN/CR -  40<H> / 1.56<H>  09-24-23 @ 07:40  BUN/CR -  42<H> / 1.46<H>    Trend Cardiac Enzymes  12-18-23 @ 10:15  KGU-[id number]-AKIIV-CPKMM/Trop I - -- - --  - --  -  --  /  58.8    Trend BNP  12-18-23 @ 10:15   -  2604<H>  11-27-23 @ 06:42   -  3970<H>  11-25-23 @ 05:45   -  3595<H>  11-22-23 @ 13:35   -  3788<H>  10-15-23 @ 22:30   -  2833<H>  09-23-23 @ 07:00   -  2902<H>      Lights Criteria :    Trend LDH- Fluid  11-24-23 @ 11:59  126 -- --      Trend LDH - Serum  11-25-23 @ 05:45  215 [50 - 242]    _____________________  Trend Protein - Fluid   11-24-23 @ 11:59  4.1  --  --      Trend Protein - Serum  12-18-23 @ 10:15  7.6 [6.0 - 8.3]  11-23-23 @ 07:03  7.1 [6.0 - 8.3]  11-22-23 @ 13:35  7.3 [6.0 - 8.3]  10-16-23 @ 07:34  7.9 [6.0 - 8.3]  10-15-23 @ 22:30  8.2 [6.0 - 8.3]       _____________________  The effusion is exudative if one of the Light’s criteria has been met:   1)  Pleural fluid protein/serum protein ratio > 0.5.   2)  Pleural fluid LDH/serum LDH ratio greater than > 0.6.   3) Pleural fluid LDH level greater than 2/3 of upper limit of normal LDH level in serum.  _____________________      RADIOLOGY  CXR:    < from: Xray Chest 1 View- PORTABLE-Urgent (12.18.23 @ 11:17) >    IMPRESSION: Increasingleft effusion since previous exam with associated   atelectatic change. Right chest is unchanged. Cardiomegaly.   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 Continued   follow-up suggested    < end of copied text >    CT:    ECHO:      VITALS:  T(C): 36.3 (12-19-23 @ 12:29), Max: 36.9 (12-18-23 @ 21:39)  T(F): 97.3 (12-19-23 @ 12:29), Max: 98.5 (12-18-23 @ 21:39)  HR: 71 (12-19-23 @ 12:29) (50 - 78)  BP: 106/63 (12-19-23 @ 12:29) (106/63 - 132/61)  BP(mean): --  ABP: --  ABP(mean): --  RR: 17 (12-19-23 @ 12:29) (16 - 18)  SpO2: 99% (12-19-23 @ 12:29) (94% - 100%)  CVP(mm Hg): --  CVP(cm H2O): --    Ins and Outs       Height (cm): 167.6 (12-18-23 @ 09:52)  Weight (kg): 70.3 (12-18-23 @ 09:52)  BMI (kg/m2): 25 (12-18-23 @ 09:52)    Physical Examination:  GENERAL:               Alert, Oriented, No acute distress.    HEENT:                   No JVD, Moist MM  PULM:                     Bilateral air entry, diminished to ausciltion on left , no significant sputum production, No Rales, No Rhonchi, No Wheezing  CVS:                         S1, S2,  +murmur  ABD:                        Soft, nondistended, nontender, normoactive bowel sounds,   EXT:                         ++edema, nontender, No Cyanosis or Clubbing   Vascular:                Warm Extremities,   SKIN:                       Warm and well perfused, no rashes noted.   NEURO:                  Alert, oriented, interactive, nonfocal, follows commands  PSYC:                      Calm, + Insight.            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   Patient with recent hospitalization for CHF exacerbation 11/22-11/27 and he left AMA. Patient is a poor historian and unable to give reliable history. Denies any chest pain, palpitations, N/V, HA, dizziness. Reports he takes his medications most of the time. He is concerned about blisters on his leg but unable to recognize HF exacerbation being the underlying issue. Also reports constipation but denies N/V/D. Reports lack of appetite but denies dysphagia.     Collateral obtained from PCP Dr. Rubio and daughter Frida. PCP reports he stopped lasix and started torsemide 100mg for fluid overload. He is concerned that patient is non compliant with recommendations and medications. Per daughter patient is in sound mind and does not stay in the hospital to complete treatment and signs out AMA. As far as she knows, patient has been compliant with medications. He is still ambulatory and goes to work daily.    (18 Dec 2023 14:27)      BRIEF HOSPITAL COURSE:   Patient admitted to medical floor noted to have increasing left sided effusion compared to discharge last month.  found to have ERV +   patient has no cough  + SOB  + Edema   + HER  no cp, palp, n/v    PAST MEDICAL & SURGICAL HISTORY:  Afib  Congestive heart failure (CHF)  CVA (cerebral vascular accident)  Hypertension, unspecified type  Chronic obstructive pulmonary disease (COPD)  No significant past surgical history        Allergies    No Known Allergies    Intolerances      FAMILY HISTORY:    Social history:    Lives with daughters (18 Dec 2023 14:27)     Smoking: Pipe smoker      Review of Systems: as stated above, limited historian     CONSTITUTIONAL: No fever, No chills, + fatigue  EYES: No eye pain, No visual disturbances, No discharge  ENMT:  No difficulty hearing, No tinnitus, No vertigo; No sinus or throat pain  NECK: No pain, No stiffness  RESPIRATORY: + Cough, No SOB, No Secretions  CARDIOVASCULAR: No chest pain, No palpitations, No dizziness, or No leg swelling  GASTROINTESTINAL: No abdominal or epigastric pain. No nausea, No vomiting, No hematemesis; No diarrhea, No constipation. No melena, No hematochezia.  GENITOURINARY: No dysuria, No frequency, No hematuria, No incontinence    PSYCHIATRIC: No depression, No anxiety, No mood swings, No difficulty sleeping      Medications:  MEDICATIONS  (STANDING):  allopurinol 100 milliGRAM(s) Oral daily  aspirin enteric coated 81 milliGRAM(s) Oral daily  atorvastatin 40 milliGRAM(s) Oral at bedtime  bacitracin   Ointment 1 Application(s) Topical daily  budesonide 160 MICROgram(s)/formoterol 4.5 MICROgram(s) Inhaler 2 Puff(s) Inhalation two times a day  furosemide   Injectable 40 milliGRAM(s) IV Push two times a day  heparin   Injectable 5000 Unit(s) SubCutaneous every 8 hours  hydrALAZINE 25 milliGRAM(s) Oral every 8 hours  melatonin 3 milliGRAM(s) Oral at bedtime  metoprolol succinate ER 50 milliGRAM(s) Oral daily  pantoprazole    Tablet 40 milliGRAM(s) Oral before breakfast  polyethylene glycol 3350 17 Gram(s) Oral two times a day  senna 2 Tablet(s) Oral at bedtime  tiotropium 2.5 MICROgram(s) Inhaler 2 Puff(s) Inhalation daily    MEDICATIONS  (PRN):  acetaminophen     Tablet .. 650 milliGRAM(s) Oral every 6 hours PRN Temp greater or equal to 38C (100.4F), Mild Pain (1 - 3)  albuterol    90 MICROgram(s) HFA Inhaler 2 Puff(s) Inhalation every 6 hours PRN Shortness of Breath and/or Wheezing      Antibiotics History      Heme Medications   aspirin enteric coated 81 milliGRAM(s) Oral daily, 12-18-23 @ 14:40  heparin   Injectable 5000 Unit(s) SubCutaneous every 8 hours, 12-18-23 @ 14:58      GI Medications  pantoprazole    Tablet 40 milliGRAM(s) Oral before breakfast, 12-18-23 @ 14:42, Routine  polyethylene glycol 3350 17 Gram(s) Oral two times a day, 12-19-23 @ 08:53,   senna 2 Tablet(s) Oral at bedtime, 12-18-23 @ 14:17, Routine        Home Medications:  Last Order Reconciliation Date: 12-18-23 @ 17:07 (Admission Reconciliation)  albuterol 90 mcg/inh inhalation aerosol: 2 puff(s) inhaled every 6 hours (12-18-23 @ 14:39)  allopurinol 100 mg oral tablet: 1 tab(s) orally once a day (12-18-23 @ 14:39)  apixaban 2.5 mg oral tablet: 1 tab(s) orally every 12 hours (12-18-23 @ 14:39)  aspirin 81 mg oral delayed release tablet: 1 tab(s) orally once a day (12-18-23 @ 14:39)  atorvastatin 40 mg oral tablet: 1 tab(s) orally once a day (at bedtime) (12-18-23 @ 14:39)  budesonide-formoterol 160 mcg-4.5 mcg/inh inhalation aerosol: 2 puff(s) inhaled 2 times a day (12-18-23 @ 14:39)  hydrALAZINE 25 mg oral tablet: 1 tab(s) orally every 8 hours (12-18-23 @ 14:39)  metoprolol succinate 50 mg oral tablet, extended release: 1 tab(s) orally once a day (12-18-23 @ 14:39)  Multiple Vitamins oral tablet: 1 tab(s) orally once a day (12-18-23 @ 14:39)  pantoprazole 40 mg oral delayed release tablet: 1 tab(s) orally once a day (before a meal) (12-18-23 @ 14:39)  tiotropium 2.5 mcg/inh inhalation aerosol: 2 puff(s) inhaled once a day (12-18-23 @ 14:39)  torsemide 100 mg oral tablet: 1 tab(s) orally once a day (12-18-23 @ 17:07)      LABS:                        9.2    7.81  )-----------( 161      ( 19 Dec 2023 06:09 )             28.8     12-19    145  |  104  |  88<H>  ----------------------------<  145<H>  3.3<L>   |  33<H>  |  1.92<H>    Ca    9.0      19 Dec 2023 06:09  Phos  4.3     12-19  Mg     2.4     12-19    TPro  7.6  /  Alb  2.9<L>  /  TBili  0.7  /  DBili  x   /  AST  22  /  ALT  17  /  AlkPhos  66  12-18    Entero/Rhinovirus (RapRVP): Detected (12.18.23 @ 10:15)    Trend Bun/Cr  12-19-23 @ 06:09  BUN/CR -  88<H> / 1.92<H>  12-18-23 @ 10:15  BUN/CR -  85<H> / 1.90<H>  11-27-23 @ 06:42  BUN/CR -  50<H> / 1.73<H>  11-26-23 @ 07:00  BUN/CR -  50<H> / 1.65<H>  11-25-23 @ 05:45  BUN/CR -  52<H> / 1.71<H>  11-24-23 @ 06:55  BUN/CR -  56<H> / 1.67<H>  11-23-23 @ 07:03  BUN/CR -  53<H> / 1.66<H>  11-22-23 @ 13:35  BUN/CR -  49<H> / 1.44<H>  10-16-23 @ 07:34  BUN/CR -  39<H> / 1.46<H>  10-15-23 @ 22:30  BUN/CR -  39<H> / 1.50<H>  09-25-23 @ 06:24  BUN/CR -  40<H> / 1.56<H>  09-24-23 @ 07:40  BUN/CR -  42<H> / 1.46<H>    Trend Cardiac Enzymes  12-18-23 @ 10:15  [id number]-ZFJFT-CPKMM/Trop I - -- - --  - --  -  --  /  58.8    Trend BNP  12-18-23 @ 10:15   -  2604<H>  11-27-23 @ 06:42   -  3970<H>  11-25-23 @ 05:45   -  3595<H>  11-22-23 @ 13:35   -  3788<H>  10-15-23 @ 22:30   -  2833<H>  09-23-23 @ 07:00   -  2902<H>      Lights Criteria :    Trend LDH- Fluid  11-24-23 @ 11:59  126 -- --      Trend LDH - Serum  11-25-23 @ 05:45  215 [50 - 242]    _____________________  Trend Protein - Fluid   11-24-23 @ 11:59  4.1  --  --      Trend Protein - Serum  12-18-23 @ 10:15  7.6 [6.0 - 8.3]  11-23-23 @ 07:03  7.1 [6.0 - 8.3]  11-22-23 @ 13:35  7.3 [6.0 - 8.3]  10-16-23 @ 07:34  7.9 [6.0 - 8.3]  10-15-23 @ 22:30  8.2 [6.0 - 8.3]       _____________________  The effusion is exudative if one of the Light’s criteria has been met:   1)  Pleural fluid protein/serum protein ratio > 0.5.   2)  Pleural fluid LDH/serum LDH ratio greater than > 0.6.   3) Pleural fluid LDH level greater than 2/3 of upper limit of normal LDH level in serum.  _____________________      RADIOLOGY  CXR:    < from: Xray Chest 1 View- PORTABLE-Urgent (12.18.23 @ 11:17) >    IMPRESSION: Increasingleft effusion since previous exam with associated   atelectatic change. Right chest is unchanged. Cardiomegaly.   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 Continued   follow-up suggested    < end of copied text >    CT:    ECHO:      VITALS:  T(C): 36.3 (12-19-23 @ 12:29), Max: 36.9 (12-18-23 @ 21:39)  T(F): 97.3 (12-19-23 @ 12:29), Max: 98.5 (12-18-23 @ 21:39)  HR: 71 (12-19-23 @ 12:29) (50 - 78)  BP: 106/63 (12-19-23 @ 12:29) (106/63 - 132/61)  BP(mean): --  ABP: --  ABP(mean): --  RR: 17 (12-19-23 @ 12:29) (16 - 18)  SpO2: 99% (12-19-23 @ 12:29) (94% - 100%)  CVP(mm Hg): --  CVP(cm H2O): --    Ins and Outs       Height (cm): 167.6 (12-18-23 @ 09:52)  Weight (kg): 70.3 (12-18-23 @ 09:52)  BMI (kg/m2): 25 (12-18-23 @ 09:52)    Physical Examination:  GENERAL:               Alert, Oriented, No acute distress.    HEENT:                   No JVD, Moist MM  PULM:                     Bilateral air entry, diminished to ausciltion on left , no significant sputum production, No Rales, No Rhonchi, No Wheezing  CVS:                         S1, S2,  +murmur  ABD:                        Soft, nondistended, nontender, normoactive bowel sounds,   EXT:                         ++edema, nontender, No Cyanosis or Clubbing   Vascular:                Warm Extremities,   SKIN:                       Warm and well perfused, no rashes noted.   NEURO:                  Alert, oriented, interactive, nonfocal, follows commands  PSYC:                      Calm, + Insight.

## 2023-12-19 NOTE — DIETITIAN INITIAL EVALUATION ADULT - ORAL NUTRITION SUPPLEMENTS
Ensure High QD (provide 350 calories, 20 gm protein per 8 oz) , Piero 1 pack BID (90 kcal, 7gm glutamine, 7gm arginine/pack)

## 2023-12-19 NOTE — PROGRESS NOTE ADULT - SUBJECTIVE AND OBJECTIVE BOX
Dr. Mckenna [facility] Medicine  Progress Note    Patient is a 89y old  Male who presents with a chief complaint of Shortness of breath/CHF exacerbation (19 Dec 2023 09:27)    SUBJECTIVE / OVERNIGHT EVENTS: Patient seen and examined. Reports constipation. Denies chest pain. Denies high salt diet. Says he is compliant with his diuretics. Denies any worsening SOB.     MEDICATIONS  (STANDING):  allopurinol 100 milliGRAM(s) Oral daily  aspirin enteric coated 81 milliGRAM(s) Oral daily  atorvastatin 40 milliGRAM(s) Oral at bedtime  bacitracin   Ointment 1 Application(s) Topical daily  budesonide 160 MICROgram(s)/formoterol 4.5 MICROgram(s) Inhaler 2 Puff(s) Inhalation two times a day  furosemide   Injectable 40 milliGRAM(s) IV Push two times a day  heparin   Injectable 5000 Unit(s) SubCutaneous every 8 hours  hydrALAZINE 25 milliGRAM(s) Oral every 8 hours  melatonin 3 milliGRAM(s) Oral at bedtime  metoprolol succinate ER 50 milliGRAM(s) Oral daily  pantoprazole    Tablet 40 milliGRAM(s) Oral before breakfast  polyethylene glycol 3350 17 Gram(s) Oral two times a day  potassium chloride   Powder 40 milliEquivalent(s) Oral once  senna 2 Tablet(s) Oral at bedtime  tiotropium 2.5 MICROgram(s) Inhaler 2 Puff(s) Inhalation daily    MEDICATIONS  (PRN):  acetaminophen     Tablet .. 650 milliGRAM(s) Oral every 6 hours PRN Temp greater or equal to 38C (100.4F), Mild Pain (1 - 3)  albuterol    90 MICROgram(s) HFA Inhaler 2 Puff(s) Inhalation every 6 hours PRN Shortness of Breath and/or Wheezing      Vital Signs Last 24 Hrs  T(C): 36.2 (19 Dec 2023 05:17), Max: 36.9 (18 Dec 2023 21:39)  T(F): 97.1 (19 Dec 2023 05:17), Max: 98.5 (18 Dec 2023 21:39)  HR: 65 (19 Dec 2023 09:15) (50 - 92)  BP: 108/55 (19 Dec 2023 05:17) (106/63 - 132/61)  BP(mean): --  RR: 18 (19 Dec 2023 05:17) (16 - 20)  SpO2: 98% (19 Dec 2023 09:15) (94% - 100%)    Parameters below as of 19 Dec 2023 09:15  Patient On (Oxygen Delivery Method): nasal cannula      PHYSICAL EXAM:  GENERAL: NAD, elderly male  HEAD:  Atraumatic, Normocephalic  EYES: EOMI, PERRLA, conjunctiva and sclera clear  NECK: Supple, No JVD  CHEST/LUNG: decreased BS l>R  HEART: Regular rate and rhythm; No murmurs, rubs, or gallops  ABDOMEN: Soft, mild distention   EXTREMITIES:  3+ pitting edema b/l with blisters on foot  PSYCH: AAOx3  NEUROLOGY: non-focal  SKIN: No rashes or lesions    LABS:                        9.2    7.81  )-----------( 161      ( 19 Dec 2023 06:09 )             28.8     12-19    145  |  104  |  88<H>  ----------------------------<  145<H>  3.3<L>   |  33<H>  |  1.92<H>    Ca    9.0      19 Dec 2023 06:09  Phos  4.3     12-19  Mg     2.4     12-19    TPro  7.6  /  Alb  2.9<L>  /  TBili  0.7  /  DBili  x   /  AST  22  /  ALT  17  /  AlkPhos  66  12-18    PT/INR - ( 18 Dec 2023 10:15 )   PT: 19.1 sec;   INR: 1.66 ratio         PTT - ( 18 Dec 2023 10:15 )  PTT:36.4 sec      Urinalysis Basic - ( 19 Dec 2023 06:09 )    Color: x / Appearance: x / SG: x / pH: x  Gluc: 145 mg/dL / Ketone: x  / Bili: x / Urobili: x   Blood: x / Protein: x / Nitrite: x   Leuk Esterase: x / RBC: x / WBC x   Sq Epi: x / Non Sq Epi: x / Bacteria: x        RADIOLOGY & ADDITIONAL TESTS:    Imaging Personally Reviewed:    Consultant(s) Notes Reviewed:  Cards    Care Discussed with Consultants/Other Providers: Case personally discussed with cardiologist Dr. Hong - c/w IV diuretics. Case discussed with pulm personally Dr. Hernandez re: need for thora    Assessment and Plan: Dr. Mckenna [facility] Medicine  Progress Note    Patient is a 89y old  Male who presents with a chief complaint of Shortness of breath/CHF exacerbation (19 Dec 2023 09:27)    SUBJECTIVE / OVERNIGHT EVENTS: Patient seen and examined. Reports constipation. Denies chest pain. Denies high salt diet. Says he is compliant with his diuretics. Denies any worsening SOB.     MEDICATIONS  (STANDING):  allopurinol 100 milliGRAM(s) Oral daily  aspirin enteric coated 81 milliGRAM(s) Oral daily  atorvastatin 40 milliGRAM(s) Oral at bedtime  bacitracin   Ointment 1 Application(s) Topical daily  budesonide 160 MICROgram(s)/formoterol 4.5 MICROgram(s) Inhaler 2 Puff(s) Inhalation two times a day  furosemide   Injectable 40 milliGRAM(s) IV Push two times a day  heparin   Injectable 5000 Unit(s) SubCutaneous every 8 hours  hydrALAZINE 25 milliGRAM(s) Oral every 8 hours  melatonin 3 milliGRAM(s) Oral at bedtime  metoprolol succinate ER 50 milliGRAM(s) Oral daily  pantoprazole    Tablet 40 milliGRAM(s) Oral before breakfast  polyethylene glycol 3350 17 Gram(s) Oral two times a day  potassium chloride   Powder 40 milliEquivalent(s) Oral once  senna 2 Tablet(s) Oral at bedtime  tiotropium 2.5 MICROgram(s) Inhaler 2 Puff(s) Inhalation daily    MEDICATIONS  (PRN):  acetaminophen     Tablet .. 650 milliGRAM(s) Oral every 6 hours PRN Temp greater or equal to 38C (100.4F), Mild Pain (1 - 3)  albuterol    90 MICROgram(s) HFA Inhaler 2 Puff(s) Inhalation every 6 hours PRN Shortness of Breath and/or Wheezing      Vital Signs Last 24 Hrs  T(C): 36.2 (19 Dec 2023 05:17), Max: 36.9 (18 Dec 2023 21:39)  T(F): 97.1 (19 Dec 2023 05:17), Max: 98.5 (18 Dec 2023 21:39)  HR: 65 (19 Dec 2023 09:15) (50 - 92)  BP: 108/55 (19 Dec 2023 05:17) (106/63 - 132/61)  BP(mean): --  RR: 18 (19 Dec 2023 05:17) (16 - 20)  SpO2: 98% (19 Dec 2023 09:15) (94% - 100%)    Parameters below as of 19 Dec 2023 09:15  Patient On (Oxygen Delivery Method): nasal cannula      PHYSICAL EXAM:  GENERAL: NAD, elderly male  HEAD:  Atraumatic, Normocephalic  EYES: EOMI, PERRLA, conjunctiva and sclera clear  NECK: Supple, No JVD  CHEST/LUNG: decreased BS l>R  HEART: Regular rate and rhythm; No murmurs, rubs, or gallops  ABDOMEN: Soft, mild distention   EXTREMITIES:  3+ pitting edema b/l with blisters on foot  PSYCH: AAOx3  NEUROLOGY: non-focal  SKIN: No rashes or lesions    LABS:                        9.2    7.81  )-----------( 161      ( 19 Dec 2023 06:09 )             28.8     12-19    145  |  104  |  88<H>  ----------------------------<  145<H>  3.3<L>   |  33<H>  |  1.92<H>    Ca    9.0      19 Dec 2023 06:09  Phos  4.3     12-19  Mg     2.4     12-19    TPro  7.6  /  Alb  2.9<L>  /  TBili  0.7  /  DBili  x   /  AST  22  /  ALT  17  /  AlkPhos  66  12-18    PT/INR - ( 18 Dec 2023 10:15 )   PT: 19.1 sec;   INR: 1.66 ratio         PTT - ( 18 Dec 2023 10:15 )  PTT:36.4 sec      Urinalysis Basic - ( 19 Dec 2023 06:09 )    Color: x / Appearance: x / SG: x / pH: x  Gluc: 145 mg/dL / Ketone: x  / Bili: x / Urobili: x   Blood: x / Protein: x / Nitrite: x   Leuk Esterase: x / RBC: x / WBC x   Sq Epi: x / Non Sq Epi: x / Bacteria: x        RADIOLOGY & ADDITIONAL TESTS:    Imaging Personally Reviewed:    Consultant(s) Notes Reviewed:  Cards    Care Discussed with Consultants/Other Providers: Case personally discussed with cardiologist Dr. Hong - c/w IV diuretics. Case discussed with pulm personally Dr. Hernandez re: need for thora    Assessment and Plan:

## 2023-12-19 NOTE — PROGRESS NOTE ADULT - PROBLEM SELECTOR PLAN 2
-worsening left sided effusion  -s/p thoracentesis with 1 L removal during last hospitalization  -cytopath negative for malignancy. Suspect 2/2 HF exacerbation  -recommend aggressive diuresis, if respiratory distress - may need therapeutic thoracentesis  -pulmonary consulted requested Dr. Marcano   -CXR in AM to assess for improvement with diuretics

## 2023-12-19 NOTE — PROGRESS NOTE ADULT - TIME BILLING
Time spent includes direct patient care (interview and examination of patient), discussion with other providers, support staff and/or patient's family members, review of medical records, ordering diagnostic tests and analyzing results, and documentation.

## 2023-12-19 NOTE — DIETITIAN INITIAL EVALUATION ADULT - OTHER CALCULATIONS
IBW: 142 Lbs +/-10%   Used for calculations current weight 155 Lbs     Fluids recommendations as per MD secondary to acute CHF and carlos lower extremities edema

## 2023-12-19 NOTE — PROGRESS NOTE ADULT - SUBJECTIVE AND OBJECTIVE BOX
RAD SINGLETON  [id number]      Chief Complaint: Acute on chronic diastolic heart failure exacerbation     Interval History: The patient reports leg swelling.     Tele: atrial fibrillation 60s BPM      Current meds:   acetaminophen     Tablet .. 650 milliGRAM(s) Oral every 6 hours PRN  albuterol    90 MICROgram(s) HFA Inhaler 2 Puff(s) Inhalation every 6 hours PRN  allopurinol 100 milliGRAM(s) Oral daily  aspirin enteric coated 81 milliGRAM(s) Oral daily  atorvastatin 40 milliGRAM(s) Oral at bedtime  bacitracin   Ointment 1 Application(s) Topical daily  budesonide 160 MICROgram(s)/formoterol 4.5 MICROgram(s) Inhaler 2 Puff(s) Inhalation two times a day  furosemide   Injectable 40 milliGRAM(s) IV Push two times a day  heparin   Injectable 5000 Unit(s) SubCutaneous every 8 hours  hydrALAZINE 25 milliGRAM(s) Oral every 8 hours  melatonin 3 milliGRAM(s) Oral at bedtime  metoprolol succinate ER 50 milliGRAM(s) Oral daily  pantoprazole    Tablet 40 milliGRAM(s) Oral before breakfast  polyethylene glycol 3350 17 Gram(s) Oral two times a day  potassium chloride   Powder 40 milliEquivalent(s) Oral once  senna 2 Tablet(s) Oral at bedtime  tiotropium 2.5 MICROgram(s) Inhaler 2 Puff(s) Inhalation daily      Objective:     Vital Signs:   T(C): 36.2 (12-19-23 @ 05:17), Max: 36.9 (12-18-23 @ 21:39)  HR: 65 (12-19-23 @ 09:15) (50 - 97)  BP: 108/55 (12-19-23 @ 05:17) (106/63 - 132/61)  RR: 18 (12-19-23 @ 05:17) (16 - 22)  SpO2: 98% (12-19-23 @ 09:15) (94% - 100%)  Wt(kg): --    Physical Exam:   General: no acute distress  Neck: supple   CVS: JVP ~ 9 cm H20, irregularly irregular, s1, s2  Pulm: unlabored respirations, decreased breath sounds   Ext: 2+ b/l lower extremity edema with ulceration   Neuro: awake, alert and oriented         Labs:   19 Dec 2023 06:09    145    |  104    |  88     ----------------------------<  145    3.3     |  33     |  1.92     Ca    9.0        19 Dec 2023 06:09  Phos  4.3       19 Dec 2023 06:09  Mg     2.4       19 Dec 2023 06:09    TPro  7.6    /  Alb  2.9    /  TBili  0.7    /  DBili  x      /  AST  22     /  ALT  17     /  AlkPhos  66     18 Dec 2023 10:15                          9.2    7.81  )-----------( 161      ( 19 Dec 2023 06:09 )             28.8     PT/INR - ( 18 Dec 2023 10:15 )   PT: 19.1 sec;   INR: 1.66 ratio         PTT - ( 18 Dec 2023 10:15 )  PTT:36.4 sec          TTE (10/2023):   1. Limited echocardiogram performed.   2. Low normal global left ventricular systolic function.   3. Left ventricular ejection fraction, by visual estimation, is 50%.   4. Normal right ventricular size and function.   5. Left atrial enlargement.   6. Right atrial enlargement.   7. Moderate thickening and calcification of the anterior and posterior mitral valve leaflets.   8. Moderately decreased mitral leaflet mobility.   9. Moderate mitral valve stenosis (mean gradient    7 mmHg at HR 77 BPM, MVA 1.1 cm^2).  10. Mild tricuspid regurgitation.  11. Large pleural effusion in the left lateral region.  12. Small pericardial effusion.      ECG (12/18/23): atrial fibrillation, nonspecific ST abnormalities        RAD SINGLETON  [id number]      Chief Complaint: Acute on chronic diastolic heart failure exacerbation     Interval History: The patient reports leg swelling.     Tele: atrial fibrillation 60s BPM      Current meds:   acetaminophen     Tablet .. 650 milliGRAM(s) Oral every 6 hours PRN  albuterol    90 MICROgram(s) HFA Inhaler 2 Puff(s) Inhalation every 6 hours PRN  allopurinol 100 milliGRAM(s) Oral daily  aspirin enteric coated 81 milliGRAM(s) Oral daily  atorvastatin 40 milliGRAM(s) Oral at bedtime  bacitracin   Ointment 1 Application(s) Topical daily  budesonide 160 MICROgram(s)/formoterol 4.5 MICROgram(s) Inhaler 2 Puff(s) Inhalation two times a day  furosemide   Injectable 40 milliGRAM(s) IV Push two times a day  heparin   Injectable 5000 Unit(s) SubCutaneous every 8 hours  hydrALAZINE 25 milliGRAM(s) Oral every 8 hours  melatonin 3 milliGRAM(s) Oral at bedtime  metoprolol succinate ER 50 milliGRAM(s) Oral daily  pantoprazole    Tablet 40 milliGRAM(s) Oral before breakfast  polyethylene glycol 3350 17 Gram(s) Oral two times a day  potassium chloride   Powder 40 milliEquivalent(s) Oral once  senna 2 Tablet(s) Oral at bedtime  tiotropium 2.5 MICROgram(s) Inhaler 2 Puff(s) Inhalation daily      Objective:     Vital Signs:   T(C): 36.2 (12-19-23 @ 05:17), Max: 36.9 (12-18-23 @ 21:39)  HR: 65 (12-19-23 @ 09:15) (50 - 97)  BP: 108/55 (12-19-23 @ 05:17) (106/63 - 132/61)  RR: 18 (12-19-23 @ 05:17) (16 - 22)  SpO2: 98% (12-19-23 @ 09:15) (94% - 100%)  Wt(kg): --    Physical Exam:   General: no acute distress  Neck: supple   CVS: JVP ~ 9 cm H20, irregularly irregular, s1, s2  Pulm: unlabored respirations, decreased breath sounds   Ext: 2+ b/l lower extremity edema with ulceration   Neuro: awake, alert and oriented         Labs:   19 Dec 2023 06:09    145    |  104    |  88     ----------------------------<  145    3.3     |  33     |  1.92     Ca    9.0        19 Dec 2023 06:09  Phos  4.3       19 Dec 2023 06:09  Mg     2.4       19 Dec 2023 06:09    TPro  7.6    /  Alb  2.9    /  TBili  0.7    /  DBili  x      /  AST  22     /  ALT  17     /  AlkPhos  66     18 Dec 2023 10:15                          9.2    7.81  )-----------( 161      ( 19 Dec 2023 06:09 )             28.8     PT/INR - ( 18 Dec 2023 10:15 )   PT: 19.1 sec;   INR: 1.66 ratio         PTT - ( 18 Dec 2023 10:15 )  PTT:36.4 sec          TTE (10/2023):   1. Limited echocardiogram performed.   2. Low normal global left ventricular systolic function.   3. Left ventricular ejection fraction, by visual estimation, is 50%.   4. Normal right ventricular size and function.   5. Left atrial enlargement.   6. Right atrial enlargement.   7. Moderate thickening and calcification of the anterior and posterior mitral valve leaflets.   8. Moderately decreased mitral leaflet mobility.   9. Moderate mitral valve stenosis (mean gradient    7 mmHg at HR 77 BPM, MVA 1.1 cm^2).  10. Mild tricuspid regurgitation.  11. Large pleural effusion in the left lateral region.  12. Small pericardial effusion.      ECG (12/18/23): atrial fibrillation, nonspecific ST abnormalities

## 2023-12-19 NOTE — DIETITIAN INITIAL EVALUATION ADULT - PROBLEM SELECTOR PLAN 5
-not in exacerbation  -given COPD, [id number]% on greater acceptable. Avoid hyperoxygenation   -c/w spiriva, Symbicort and albuterol PRN -not in exacerbation  -given COPD, [id number]% on greater acceptable. Avoid hyperoxygenation   -c/w spiriva, Symbicort and albuterol PRN

## 2023-12-19 NOTE — PHYSICAL THERAPY INITIAL EVALUATION ADULT - ADDITIONAL COMMENTS
Pt lives alone in house 1 [address]IE no stairs inside, ambulates with RW. pt's friend/co-worker assists  w/ADL's, pt states he still works at [employer] Pt lives alone in house 1 [address] no stairs inside, ambulates with RW. pt's friend/co-worker assists  w/ADL's, pt states he still works at [employer]

## 2023-12-19 NOTE — DIETITIAN INITIAL EVALUATION ADULT - OTHER INFO
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    Pt reports poor appetite. PO intake <25%. Pt feeds self, reports no chewing/swallowing difficulties. NKFA. Denies N/V/D. Last BM: 12/19 hard stools. UBW: 170 Lbs ~ 6 months ago, current weight 155 Lbs. Edema on carlos leg +3, carlos foot +4. Skin with blister stage 2. Pt is on 1000mL/day fluids restrictions. Noted pt with dry mouth. 12/19 labs Hgb 9.2 (L), Hct 28.8 (L), .1 (H), 12/18: Na 147 (L), BUN 85 (H), Cr 1.9 (H), eGFR 33 (L), 9/20: HgA1C 5.8, 9/19: Chol 128, Trig 54, HDL 56, LDL 61. Discussed with patient benefits of supplements for wound healing and fluids restrictions. Pt is willing to take Piero during lunch and dinner, like taking Ensure 8oz daily.  Importance of maintaining food intake low in salt to avoid more fluids retention. Pt visually noted with muscle/fat wasting in upper body.    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    Pt reports poor appetite. PO intake <25%. Pt feeds self, reports no chewing/swallowing difficulties. NKFA. Denies N/V/D. Last BM: 12/19 hard stools. UBW: 170 Lbs ~ 6 months ago, current weight 155 Lbs. Edema on carlos leg +3, carlos foot +4. Skin with blister stage 2. Pt is on 1000mL/day fluids restrictions. Noted pt with dry mouth. 12/19 labs Hgb 9.2 (L), Hct 28.8 (L), .1 (H), 12/18: Na 147 (L), BUN 85 (H), Cr 1.9 (H), eGFR 33 (L), 9/20: HgA1C 5.8, 9/19: Chol 128, Trig 54, HDL 56, LDL 61. Discussed with patient benefits of supplements for wound healing and fluids restrictions. Pt is willing to take Piero during lunch and dinner, like taking Ensure 8oz daily.  Importance of maintaining food intake low in salt to avoid more fluids retention. Pt visually noted with muscle/fat wasting in upper body.

## 2023-12-19 NOTE — DIETITIAN INITIAL EVALUATION ADULT - NSFNSPHYEXAMSKINFT_GEN_A_CORE
Pressure Injury 1: Right:, LE, Stage II  Pressure Injury 2: Right:, LE blister, Stage II  Pressure Injury 3: none, none  Pressure Injury 4: none, none  Pressure Injury 5: none, none  Pressure Injury 6: none, none  Pressure Injury 7: none, none  Pressure Injury 8: none, none  Pressure Injury 9: none, none  Pressure Injury 10: none, none  Pressure Injury 11: none, none, Pressure Injury 1: Right:, LE, Stage II  Pressure Injury 2: right LE blister, Stage II  Pressure Injury 3: none, none  Pressure Injury 4: none, none  Pressure Injury 5: none, none  Pressure Injury 6: none, none  Pressure Injury 7: none, none  Pressure Injury 8: none, none  Pressure Injury 9: none, none  Pressure Injury 10: none, none  Pressure Injury 11: none, none, Pressure Injury 1: Right:, LE, Stage II  Pressure Injury 2: Right:, LE blister, Stage II  Pressure Injury 3: none, none  Pressure Injury 4: none, none  Pressure Injury 5: none, none  Pressure Injury 6: none, none  Pressure Injury 7: none, none  Pressure Injury 8: none, none  Pressure Injury 9: none, none  Pressure Injury 10: none, none  Pressure Injury 11: none, none

## 2023-12-19 NOTE — DIETITIAN INITIAL EVALUATION ADULT - ADD RECOMMEND
Suggest to add MVI, Vit C, Vit B12   Honor pt's food preferences as feasible with prescribed diet.   Obtain and record PO intake and weights daily   Continue with Therapeutic low sodium Diet education at f/u interview.

## 2023-12-19 NOTE — DIETITIAN INITIAL EVALUATION ADULT - SIGNS/SYMPTOMS
blister stage 2 on R lower leg PO intake <50% x > than 2 weeks, 8.8 % x 6 months, edema on carlos LE, muscle/fat wasting.

## 2023-12-20 NOTE — PROGRESS NOTE ADULT - ASSESSMENT
Assessment:  Jaylon Antony is an 89 year old man with past medical history of Atrial fibrillation (on Eliquis), HFrEF (LVEF 25-30% in 2019), Hypertension, Chronic kidney disease, COPD, history of CVA with recurrent hospitalizations for congestive heart failure likely from medication noncompliance now presents with progressive lower extremity edema and shortness of breath, suggestive of acute on chronic diastolic heart failure exacerbation.     ECG consistent with atrial fibrillation and nonspecific ST abnormalities. Troponins are not elevated in range that would suggest an acute coronary syndrome. Pro BNP elevated but less than prior CHF hospitalization. Chest xray with increasing left effusion. The patient is not able to explain what medications he takes and there is concern for medication noncompliance.     Repeat echo with LVEF 50-55% and moderate mitral valve stenosis.     Recommendations:  [] HFpEF, Moderate mitral stenosis: Likely secondary to component of noncompliance. Continue Lasix 40 mg IVP BID (primary team patient is now on Torsemide at home). Now s/p thoracentesis with 1L fluid removed, follow up fluid studies and Pulmonary. Monitor renal function. If renal function stabilizes then may consider SGLT2-I this admission. Patient not interested in surgical procedures for treatment for the mitral stenosis due to his age of 89.  [] Atrial fibrillation: Continue beta blocker, resume Eliquis for stroke prophylaxis once safe per Pulmonary     Will sign out this case to cardiologist to follow along tomorrow.    Kevin Hong MD  Cardiology

## 2023-12-20 NOTE — PROGRESS NOTE ADULT - NUTRITIONAL ASSESSMENT
This patient has been assessed with a concern for Malnutrition and has been determined to have a diagnosis/diagnoses of Severe protein-calorie malnutrition.    This patient is being managed with:   Diet Regular-  1000mL Fluid Restriction ([id number])  Low Sodium  Piero(7 Gm Arginine/7 Gm Glut/1.2 Gm HMB     Qty per Day:  2  Supplement Feeding Modality:  Oral  Ensure Plus High Protein Cans or Servings Per Day:  1       Frequency:  Daily  Entered: Dec 19 2023 11:11AM   This patient has been assessed with a concern for Malnutrition and has been determined to have a diagnosis/diagnoses of Severe protein-calorie malnutrition.    This patient is being managed with:   Diet Regular-  1000mL Fluid Restriction ([id number])  Low Sodium  Piero(7 Gm Arginine/7 Gm Glut/1.2 Gm HMB     Qty per Day:  2  Supplement Feeding Modality:  Oral  Ensure Plus High Protein Cans or Servings Per Day:  1       Frequency:  Daily  Entered: Dec 19 2023 11:11AM

## 2023-12-20 NOTE — CONSULT NOTE ADULT - TIME BILLING
Total time spent including the following  [X] Physical chart review and documentation   An extensive review of the physical chart, electronic health record, and documentation was conducted to obtain collateral information including but not limited to:  - Current inpatient records (ED, H&P, primary team, and consultants if applicable)  - Inpatient values/results (biomarkers, immunoassays, imaging, and microbiology results)  - Outpatient records  - Prior inpatient records (if available)  - Social work assessments  - Current or proposed treatment plans  - Pharmacotherapy review (including I-STOP if applicable)  []review of medical literature related to pathogenesis, disease/illness progress, treatment and/or prognosis  []care coordination  [X]discussion with the primary team - spoke with Dr. Ayon. will continue to follow for ongoing conversations  [X]discussion with floor staff - discussed pt's progress with nursing staff  []discussion with consultant(s)  []discussion with the patient, surrogate decision maker, or family  [X]Physical Exam and/or review of systems   [X]Formulation of assessment and plan

## 2023-12-20 NOTE — PROGRESS NOTE ADULT - ATTENDING COMMENTS
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  - continue lasix 40mg IV BID for now, still appears overloaded  - CT chest this AM with moderate effusion still present, also with edematous legs  - follow up fluid analysis results, possible exudative as it has been in the past, concern for malignancy given lymphadenopathy on CT chest however cytopathology in the past has been negative for malignancy   - discussed with pulm, Dr. Sr who stated Dr. Hernandez discussed case with CT surgery Dr. Arnold, recommended to continue to monitor for now until fluid results come back  - cardio recs appreciated    #chronic afib  - continue metoprolol  - ok to resume Eliquis as per pulm  - follow up CXR in the AM

## 2023-12-20 NOTE — PROGRESS NOTE ADULT - SUBJECTIVE AND OBJECTIVE BOX
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 Patient with recent hospitalization for CHF exacerbation 11/22-11/27 and he left AMA. Patient is a poor historian and unable to give reliable history. Denies any chest pain, palpitations, N/V, HA, dizziness. Reports he takes his medications most of the time. He is concerned about blisters on his leg but unable to recognize HF exacerbation being the underlying issue. Also reports constipation but denies N/V/D. Reports lack of appetite but denies dysphagia.     Subjective  pt seen and evaluated at bedside this AM. NAD. A&Ox3. pt frustrated about recurrent hospitalizations. breathing improved compared to yesterday. +swelling of legs. +pain with palpation of legs. pt states he is unsure how his lips were bleeding a few days ago.       REVIEW OF SYSTEMS:  CONSTITUTIONAL: No weakness, fevers or chills  EYES/ENT: No visual changes;  No vertigo or throat pain   NECK: No pain or stiffness  RESPIRATORY: improving shortness of breath   CARDIOVASCULAR: No chest pain or palpitations  GASTROINTESTINAL: No abdominal or epigastric pain. No nausea, vomiting, or hematemesis; No diarrhea or constipation. No melena or hematochezia.  GENITOURINARY: No dysuria, frequency or hematuria  NEUROLOGICAL: No numbness or weakness  SKIN: +blisters b/l   All other review of systems is negative unless indicated above    PHYSICAL EXAM:  Vital Signs Last 24 Hrs  T(C): 36.5 (20 Dec 2023 04:58), Max: 36.6 (19 Dec 2023 20:49)  T(F): 97.7 (20 Dec 2023 04:58), Max: 97.8 (19 Dec 2023 20:49)  HR: 87 (20 Dec 2023 08:17) (69 - 87)  BP: 110/51 (20 Dec 2023 04:58) (106/63 - 117/75)  BP(mean): --  RR: 17 (20 Dec 2023 04:58) (17 - 17)  SpO2: 98% (20 Dec 2023 08:17) (96% - 99%)    Parameters below as of 20 Dec 2023 08:17  Patient On (Oxygen Delivery Method): room air    Constitutional: Pt lying in bed, awake and alert, NAD  HEENT: EOMI, normal hearing, moist mucous membranes  Neck: Soft and supple, no JVD  Respiratory: CTABL, No wheezing, rales or rhonchi  Cardiovascular: S1S2+, RRR, no M/G/R  Gastrointestinal: BS+, soft, NT/ND, no guarding, no rebound  Extremities: No peripheral edema  Vascular: 2+ peripheral pulses  Neurological: AAOx3, no focal deficits  Musculoskeletal: 5/5 strength b/l upper and lower extremities  Skin: LE blisters noted b/l    MEDICATIONS:  MEDICATIONS  (STANDING):  allopurinol 100 milliGRAM(s) Oral daily  aspirin enteric coated 81 milliGRAM(s) Oral daily  atorvastatin 40 milliGRAM(s) Oral at bedtime  bacitracin   Ointment 1 Application(s) Topical daily  budesonide 160 MICROgram(s)/formoterol 4.5 MICROgram(s) Inhaler 2 Puff(s) Inhalation two times a day  furosemide   Injectable 40 milliGRAM(s) IV Push two times a day  hydrALAZINE 25 milliGRAM(s) Oral every 8 hours  melatonin 3 milliGRAM(s) Oral at bedtime  metoprolol succinate ER 50 milliGRAM(s) Oral daily  pantoprazole    Tablet 40 milliGRAM(s) Oral before breakfast  polyethylene glycol 3350 17 Gram(s) Oral two times a day  senna 2 Tablet(s) Oral at bedtime  tiotropium 2.5 MICROgram(s) Inhaler 2 Puff(s) Inhalation daily      LABS: All Labs Reviewed:                        9.6    8.12  )-----------( 154      ( 20 Dec 2023 05:57 )             31.1     12-20    144  |  103  |  83<H>  ----------------------------<  150<H>  4.0   |  34<H>  |  2.09<H>    Ca    8.6      20 Dec 2023 05:57  Phos  4.3     12-19  Mg     2.4     12-19    TPro  7.6  /  Alb  2.9<L>  /  TBili  0.7  /  DBili  x   /  AST  22  /  ALT  17  /  AlkPhos  66  12-18    PT/INR - ( 18 Dec 2023 10:15 )   PT: 19.1 sec;   INR: 1.66 ratio         PTT - ( 18 Dec 2023 10:15 )  PTT:36.4 sec      Blood Culture: 12-19 @ 13:46  Organism --  Gram Stain Blood -- Gram Stain   polymorphonuclear leukocytes seen  No organisms seen  by cytocentrifuge  Specimen Source Pleural Fl Pleural Fluid  Culture-Blood --      I&O's Summary    19 Dec 2023 07:01  -  20 Dec 2023 07:00  --------------------------------------------------------  IN: 0 mL / OUT: 500 mL / NET: -500 mL    20 Dec 2023 07:01  -  20 Dec 2023 10:03  --------------------------------------------------------  IN: 120 mL / OUT: 0 mL / NET: 120 mL      CAPILLARY BLOOD GLUCOSE          RADIOLOGY/EKG: History of Present Illness:   89y year old M with PMH of 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 Patient with recent hospitalization for CHF exacerbation 11/22-11/27 and he left AMA. Patient is a poor historian and unable to give reliable history. Denies any chest pain, palpitations, N/V, HA, dizziness. Reports he takes his medications most of the time. He is concerned about blisters on his leg but unable to recognize HF exacerbation being the underlying issue. Also reports constipation but denies N/V/D. Reports lack of appetite but denies dysphagia.     Subjective  pt seen and evaluated at bedside this AM. NAD. A&Ox3. pt frustrated about recurrent hospitalizations. breathing improved compared to yesterday. +swelling of legs. +pain with palpation of legs. pt states he is unsure how his lips were bleeding a few days ago.       REVIEW OF SYSTEMS:  CONSTITUTIONAL: No weakness, fevers or chills  EYES/ENT: No visual changes;  No vertigo or throat pain   NECK: No pain or stiffness  RESPIRATORY: improving shortness of breath   CARDIOVASCULAR: No chest pain or palpitations  GASTROINTESTINAL: No abdominal or epigastric pain. No nausea, vomiting, or hematemesis; No diarrhea or constipation. No melena or hematochezia.  GENITOURINARY: No dysuria, frequency or hematuria  NEUROLOGICAL: No numbness or weakness  SKIN: +blisters b/l   All other review of systems is negative unless indicated above    PHYSICAL EXAM:  Vital Signs Last 24 Hrs  T(C): 36.5 (20 Dec 2023 04:58), Max: 36.6 (19 Dec 2023 20:49)  T(F): 97.7 (20 Dec 2023 04:58), Max: 97.8 (19 Dec 2023 20:49)  HR: 87 (20 Dec 2023 08:17) (69 - 87)  BP: 110/51 (20 Dec 2023 04:58) (106/63 - 117/75)  BP(mean): --  RR: 17 (20 Dec 2023 04:58) (17 - 17)  SpO2: 98% (20 Dec 2023 08:17) (96% - 99%)    Parameters below as of 20 Dec 2023 08:17  Patient On (Oxygen Delivery Method): room air    Constitutional: Pt lying in bed, awake and alert, NAD  HEENT: EOMI, normal hearing, moist mucous membranes  Neck: Soft and supple, no JVD  Respiratory: CTABL, No wheezing, bibasilar rales noted worse on L  Cardiovascular: S1S2+, RRR, no M/G/R  Gastrointestinal: BS+, soft, NT/ND, no guarding, no rebound  Extremities: 2+ pitting edema b/l LE, LE blisters noted b/l  Vascular: 2+ peripheral pulses  Neurological: AAOx3, no focal deficits      MEDICATIONS:  MEDICATIONS  (STANDING):  allopurinol 100 milliGRAM(s) Oral daily  aspirin enteric coated 81 milliGRAM(s) Oral daily  atorvastatin 40 milliGRAM(s) Oral at bedtime  bacitracin   Ointment 1 Application(s) Topical daily  budesonide 160 MICROgram(s)/formoterol 4.5 MICROgram(s) Inhaler 2 Puff(s) Inhalation two times a day  furosemide   Injectable 40 milliGRAM(s) IV Push two times a day  hydrALAZINE 25 milliGRAM(s) Oral every 8 hours  melatonin 3 milliGRAM(s) Oral at bedtime  metoprolol succinate ER 50 milliGRAM(s) Oral daily  pantoprazole    Tablet 40 milliGRAM(s) Oral before breakfast  polyethylene glycol 3350 17 Gram(s) Oral two times a day  senna 2 Tablet(s) Oral at bedtime  tiotropium 2.5 MICROgram(s) Inhaler 2 Puff(s) Inhalation daily      LABS: All Labs Reviewed:                        9.6    8.12  )-----------( 154      ( 20 Dec 2023 05:57 )             31.1     12-20    144  |  103  |  83<H>  ----------------------------<  150<H>  4.0   |  34<H>  |  2.09<H>    Ca    8.6      20 Dec 2023 05:57  Phos  4.3     12-19  Mg     2.4     12-19    TPro  7.6  /  Alb  2.9<L>  /  TBili  0.7  /  DBili  x   /  AST  22  /  ALT  17  /  AlkPhos  66  12-18    PT/INR - ( 18 Dec 2023 10:15 )   PT: 19.1 sec;   INR: 1.66 ratio         PTT - ( 18 Dec 2023 10:15 )  PTT:36.4 sec      Blood Culture: 12-19 @ 13:46  Organism --  Gram Stain Blood -- Gram Stain   polymorphonuclear leukocytes seen  No organisms seen  by cytocentrifuge  Specimen Source Pleural Fl Pleural Fluid  Culture-Blood --      I&O's Summary    19 Dec 2023 07:01  -  20 Dec 2023 07:00  --------------------------------------------------------  IN: 0 mL / OUT: 500 mL / NET: -500 mL    20 Dec 2023 07:01  -  20 Dec 2023 10:03  --------------------------------------------------------  IN: 120 mL / OUT: 0 mL / NET: 120 mL      CAPILLARY BLOOD GLUCOSE          RADIOLOGY/EKG:

## 2023-12-20 NOTE — PROGRESS NOTE ADULT - ASSESSMENT
Physical Examination:  GENERAL:               Alert, Oriented, No acute distress.    HEENT:                   No JVD, Moist MM  PULM:                     Bilateral air entry,  No Rales, No Rhonchi, No Wheezing  CVS:                         S1, S2,  +murmur  ABD:                        Soft, nondistended, nontender, normoactive bowel sounds,   EXT:                         +edema, nontender, No Cyanosis or Clubbing   Vascular:                Warm Extremities,   SKIN:                       Warm and well perfused, no rashes noted.   NEURO:                  Alert, oriented, interactive, nonfocal, follows commands  PSYC:                      Calm, + Insight.      Assessment  ERV  Dyspnea suspect due to acute on chronic Diastolic CHF  Chronic Left pleural effusions  s/p thoracentesis 11/24/23    Afib on a/c  Cigar smoker, at risk for copd.   Worsening renal function on CKD    Plan  pleural effusion recurrent - appears exudative effusion   unsure if primary cause of SOB, but as fluid was previously exudative with h/o pipe smoking and patient off a/c   Follow up pleural fluid results   Can cont a/c  monitor on room air  out of bed as tolerated  optimize cardiac meds as per Cardio  Rest of care as per primary team.

## 2023-12-20 NOTE — PROGRESS NOTE ADULT - SUBJECTIVE AND OBJECTIVE BOX
RAD SINGLETON  [id number]      Chief Complaint: Acute on chronic diastolic heart failure exacerbation     Interval History: The patient reports leg swelling is improving.     Tele: atrial fibrillation 70s BPM      Current meds:   acetaminophen     Tablet .. 650 milliGRAM(s) Oral every 6 hours PRN  albuterol    90 MICROgram(s) HFA Inhaler 2 Puff(s) Inhalation every 6 hours PRN  allopurinol 100 milliGRAM(s) Oral daily  aspirin enteric coated 81 milliGRAM(s) Oral daily  atorvastatin 40 milliGRAM(s) Oral at bedtime  bacitracin   Ointment 1 Application(s) Topical daily  budesonide 160 MICROgram(s)/formoterol 4.5 MICROgram(s) Inhaler 2 Puff(s) Inhalation two times a day  furosemide   Injectable 40 milliGRAM(s) IV Push two times a day  hydrALAZINE 25 milliGRAM(s) Oral every 8 hours  melatonin 3 milliGRAM(s) Oral at bedtime  metoprolol succinate ER 50 milliGRAM(s) Oral daily  pantoprazole    Tablet 40 milliGRAM(s) Oral before breakfast  polyethylene glycol 3350 17 Gram(s) Oral two times a day  senna 2 Tablet(s) Oral at bedtime  tiotropium 2.5 MICROgram(s) Inhaler 2 Puff(s) Inhalation daily      Objective:     Vital Signs:   T(C): 36.5 (12-20-23 @ 04:58), Max: 36.6 (12-19-23 @ 20:49)  HR: 87 (12-20-23 @ 08:17) (69 - 87)  BP: 110/51 (12-20-23 @ 04:58) (106/63 - 117/75)  RR: 17 (12-20-23 @ 04:58) (17 - 17)  SpO2: 98% (12-20-23 @ 08:17) (96% - 99%)  Wt(kg): --    Physical Exam:   General: no acute distress  Neck: supple   CVS: JVP ~ 9 cm H20, irregularly irregular, s1, s2  Pulm: unlabored respirations, decreased breath sounds   Ext: 2+ b/l lower extremity edema with ulceration   Neuro: awake, alert and oriented     Labs:   20 Dec 2023 05:57    144    |  103    |  83     ----------------------------<  150    4.0     |  34     |  2.09     Ca    8.6        20 Dec 2023 05:57  Phos  4.3       19 Dec 2023 06:09  Mg     2.4       19 Dec 2023 06:09                            9.6    8.12  )-----------( 154      ( 20 Dec 2023 05:57 )             31.1         TTE (10/2023):   1. Limited echocardiogram performed.   2. Low normal global left ventricular systolic function.   3. Left ventricular ejection fraction, by visual estimation, is 50%.   4. Normal right ventricular size and function.   5. Left atrial enlargement.   6. Right atrial enlargement.   7. Moderate thickening and calcification of the anterior and posterior mitral valve leaflets.   8. Moderately decreased mitral leaflet mobility.   9. Moderate mitral valve stenosis (mean gradient    7 mmHg at HR 77 BPM, MVA 1.1 cm^2).  10. Mild tricuspid regurgitation.  11. Large pleural effusion in the left lateral region.  12. Small pericardial effusion.    TTE (12/19/23):   1. The heart rhythm is irregular throughout the study.   2. Normal global left ventricular systolic function.   3. Left ventricular ejection fraction, by visual estimation, is 50 to 55%.   4. Mildly increased LV wall thickness.   5. Normal left ventricular internal cavity size.   6. The right ventricle is not well visualized, appears generally normal   in size.   7. Left atrial enlargement.   8. Right atrial enlargement.   9. Mild mitral annular calcification.  10. Moderate thickening and calcification of the anterior and posterior mitral valve leaflets.  11. Moderate mitral valve stenosis (mean gradient 8 mmHg at HR 69 BPM, MVA 1.3 cm^2 by  msec).  12. Mild mitral valve regurgitation.  13. Mild-moderate tricuspid regurgitation.  14. Aortic valve leaflet calcification. No aortic valve stenosis.  15. Sclerotic aortic valve with normal opening.  16. Mild aortic regurgitation.  17. Estimated pulmonary artery systolic pressure is 38.0 mmHg assuming a right atrial pressure of 15 mmHg, which is consistent with borderline pulmonary hypertension.  18. Moderate pleural effusion in both left and right lateral regions.  19. There is no evidence of pericardial effusion.      ECG (12/18/23): atrial fibrillation, nonspecific ST abnormalities      RAD SINGLETON  [id number]      Chief Complaint: Acute on chronic diastolic heart failure exacerbation     Interval History: The patient reports leg swelling is improving.     Tele: atrial fibrillation 70s BPM      Current meds:   acetaminophen     Tablet .. 650 milliGRAM(s) Oral every 6 hours PRN  albuterol    90 MICROgram(s) HFA Inhaler 2 Puff(s) Inhalation every 6 hours PRN  allopurinol 100 milliGRAM(s) Oral daily  aspirin enteric coated 81 milliGRAM(s) Oral daily  atorvastatin 40 milliGRAM(s) Oral at bedtime  bacitracin   Ointment 1 Application(s) Topical daily  budesonide 160 MICROgram(s)/formoterol 4.5 MICROgram(s) Inhaler 2 Puff(s) Inhalation two times a day  furosemide   Injectable 40 milliGRAM(s) IV Push two times a day  hydrALAZINE 25 milliGRAM(s) Oral every 8 hours  melatonin 3 milliGRAM(s) Oral at bedtime  metoprolol succinate ER 50 milliGRAM(s) Oral daily  pantoprazole    Tablet 40 milliGRAM(s) Oral before breakfast  polyethylene glycol 3350 17 Gram(s) Oral two times a day  senna 2 Tablet(s) Oral at bedtime  tiotropium 2.5 MICROgram(s) Inhaler 2 Puff(s) Inhalation daily      Objective:     Vital Signs:   T(C): 36.5 (12-20-23 @ 04:58), Max: 36.6 (12-19-23 @ 20:49)  HR: 87 (12-20-23 @ 08:17) (69 - 87)  BP: 110/51 (12-20-23 @ 04:58) (106/63 - 117/75)  RR: 17 (12-20-23 @ 04:58) (17 - 17)  SpO2: 98% (12-20-23 @ 08:17) (96% - 99%)  Wt(kg): --    Physical Exam:   General: no acute distress  Neck: supple   CVS: JVP ~ 9 cm H20, irregularly irregular, s1, s2  Pulm: unlabored respirations, decreased breath sounds   Ext: 2+ b/l lower extremity edema with ulceration   Neuro: awake, alert and oriented     Labs:   20 Dec 2023 05:57    144    |  103    |  83     ----------------------------<  150    4.0     |  34     |  2.09     Ca    8.6        20 Dec 2023 05:57  Phos  4.3       19 Dec 2023 06:09  Mg     2.4       19 Dec 2023 06:09                            9.6    8.12  )-----------( 154      ( 20 Dec 2023 05:57 )             31.1         TTE (10/2023):   1. Limited echocardiogram performed.   2. Low normal global left ventricular systolic function.   3. Left ventricular ejection fraction, by visual estimation, is 50%.   4. Normal right ventricular size and function.   5. Left atrial enlargement.   6. Right atrial enlargement.   7. Moderate thickening and calcification of the anterior and posterior mitral valve leaflets.   8. Moderately decreased mitral leaflet mobility.   9. Moderate mitral valve stenosis (mean gradient    7 mmHg at HR 77 BPM, MVA 1.1 cm^2).  10. Mild tricuspid regurgitation.  11. Large pleural effusion in the left lateral region.  12. Small pericardial effusion.    TTE (12/19/23):   1. The heart rhythm is irregular throughout the study.   2. Normal global left ventricular systolic function.   3. Left ventricular ejection fraction, by visual estimation, is 50 to 55%.   4. Mildly increased LV wall thickness.   5. Normal left ventricular internal cavity size.   6. The right ventricle is not well visualized, appears generally normal   in size.   7. Left atrial enlargement.   8. Right atrial enlargement.   9. Mild mitral annular calcification.  10. Moderate thickening and calcification of the anterior and posterior mitral valve leaflets.  11. Moderate mitral valve stenosis (mean gradient 8 mmHg at HR 69 BPM, MVA 1.3 cm^2 by  msec).  12. Mild mitral valve regurgitation.  13. Mild-moderate tricuspid regurgitation.  14. Aortic valve leaflet calcification. No aortic valve stenosis.  15. Sclerotic aortic valve with normal opening.  16. Mild aortic regurgitation.  17. Estimated pulmonary artery systolic pressure is 38.0 mmHg assuming a right atrial pressure of 15 mmHg, which is consistent with borderline pulmonary hypertension.  18. Moderate pleural effusion in both left and right lateral regions.  19. There is no evidence of pericardial effusion.      ECG (12/18/23): atrial fibrillation, nonspecific ST abnormalities

## 2023-12-20 NOTE — CONSULT NOTE ADULT - SUBJECTIVE AND OBJECTIVE BOX
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 Patient with recent hospitalization for CHF exacerbation 11/22-11/27 and he left AMA. Patient is a poor historian and unable to give reliable history. Denies any chest pain, palpitations, N/V, HA, dizziness. Reports he takes his medications most of the time. He is concerned about blisters on his leg but unable to recognize HF exacerbation being the underlying issue. Also reports constipation but denies N/V/D. Reports lack of appetite but denies dysphagia.     Collateral obtained from PCP Dr. Rubio and daughter Frida. PCP reports he stopped lasix and started torsemide 100mg for fluid overload. He is concerned that patient is non compliant with recommendations and medications. Per daughter patient is in sound mind and does not stay in the hospital to complete treatment and signs out AMA. As far as she knows, patient has been compliant with medications. He is still ambulatory and goes to work daily.     Patient with recurrent admissions; palliative care consulted. Pt seen and examined at bedside this morning. He reports difficulty breathing yesterday, partly improved s/p thora yesterday. He denies nausea/vomiting/abd pain but reports pain to his BLE.     PERTINENT PM/SXH:   Afib    Congestive heart failure (CHF)    CVA (cerebral vascular accident)    Hypertension, unspecified type    Chronic obstructive pulmonary disease (COPD)      No significant past surgical history      FAMILY HISTORY:    Family Hx substance abuse [ ]yes [ ]no  ITEMS NOT CHECKED ARE NOT PRESENT    SOCIAL HISTORY:    Children 2 daughters Musa  Temple/Spirituality: Maltese Jew  Substance hx: no  Tobacco hx:  no   Alcohol hx: no  Living Situation: Home with his daughters    ADVANCE DIRECTIVES:    DNR/MOLST- no - FULL CODE  DECISION MAKER(s):   Surrogate(s)       - 2 daughters Frida and Salena  Name(s): Phone Number(s):  [phone]; [phone]    BASELINE (I)ADL(s) (prior to admission):  [place]:  Moderate      Allergies  No Known Allergies    Intolerances    MEDICATIONS  (STANDING):  allopurinol 100 milliGRAM(s) Oral daily  aspirin enteric coated 81 milliGRAM(s) Oral daily  atorvastatin 40 milliGRAM(s) Oral at bedtime  bacitracin   Ointment 1 Application(s) Topical daily  budesonide 160 MICROgram(s)/formoterol 4.5 MICROgram(s) Inhaler 2 Puff(s) Inhalation two times a day  furosemide   Injectable 40 milliGRAM(s) IV Push two times a day  hydrALAZINE 25 milliGRAM(s) Oral every 8 hours  melatonin 3 milliGRAM(s) Oral at bedtime  metoprolol succinate ER 50 milliGRAM(s) Oral daily  pantoprazole    Tablet 40 milliGRAM(s) Oral before breakfast  polyethylene glycol 3350 17 Gram(s) Oral two times a day  senna 2 Tablet(s) Oral at bedtime  tiotropium 2.5 MICROgram(s) Inhaler 2 Puff(s) Inhalation daily    MEDICATIONS  (PRN):  acetaminophen     Tablet .. 650 milliGRAM(s) Oral every 6 hours PRN Temp greater or equal to 38C (100.4F), Mild Pain (1 - 3)  albuterol    90 MICROgram(s) HFA Inhaler 2 Puff(s) Inhalation every 6 hours PRN Shortness of Breath and/or Wheezing    PRESENT SYMPTOMS:    Pain: yes   QOL impact - moderate  Location -  BLE                  Aggravating factors - worse with movement  Quality - does not report  Radiation -  does not report  Timing-   Severity (0-10 scale):  does not report  Minimal acceptable level (0-10 scale):   does not report      Dyspnea:                          Mild    Anxiety:                             does not report  Depressed:                     does not report  Fatigue:                             mild  Nausea:                            none  Loss of appetite:             none  Constipation:                    none      Other Symptoms:  [X ]All other review of systems negative       Chaplaincy Referral:   Deferred   Palliative Performance Status Version 2:     50    %    http://npcrc.org/files/news/palliative_performance_scale_ppsv2.pdf    PHYSICAL EXAM:  Vital Signs Last 24 Hrs  T(C): 36.5 (20 Dec 2023 04:58), Max: 36.6 (19 Dec 2023 20:49)  T(F): 97.7 (20 Dec 2023 04:58), Max: 97.8 (19 Dec 2023 20:49)  HR: 87 (20 Dec 2023 08:17) (69 - 87)  BP: 110/51 (20 Dec 2023 04:58) (106/63 - 117/75)  BP(mean): --  RR: 17 (20 Dec 2023 04:58) (17 - 17)  SpO2: 98% (20 Dec 2023 08:17) (96% - 99%)    Parameters below as of 20 Dec 2023 08:17  Patient On (Oxygen Delivery Method): room air     I&O's Summary    19 Dec 2023 07:01  -  20 Dec 2023 07:00  --------------------------------------------------------  IN: 0 mL / OUT: 500 mL / NET: -500 mL    20 Dec 2023 07:01  -  20 Dec 2023 10:18  --------------------------------------------------------  IN: 120 mL / OUT: 200 mL / NET: -80 mL      GENERAL: disheveled elderly male sitting in chair in NAD  HEENT: NC/AT, MMM  PULMONARY: decreased breath sounds to LLB  CARDIOVASCULAR:  irregularly irregular rhythm  GASTROINTESTINAL: soft, nontender  Last BM:  MUSCULOSKELETAL: +weakness, moves all extremities; significant edema to BLE  NEUROLOGIC: A&Ox3, disgruntled man  SKIN: see nursing assessments for details; bullous formation to RLE, significant discoloration noted      LABS:                        9.6    8.12  )-----------( 154      ( 20 Dec 2023 05:57 )             31.1   12-20    144  |  103  |  83<H>  ----------------------------<  150<H>  4.0   |  34<H>  |  2.09<H>    Ca    8.6      20 Dec 2023 05:57  Phos  4.3     12-19  Mg     2.4     12-19        Urinalysis Basic - ( 20 Dec 2023 05:57 )    Color: x / Appearance: x / SG: x / pH: x  Gluc: 150 mg/dL / Ketone: x  / Bili: x / Urobili: x   Blood: x / Protein: x / Nitrite: x   Leuk Esterase: x / RBC: x / WBC x   Sq Epi: x / Non Sq Epi: x / Bacteria: x      RADIOLOGY & ADDITIONAL STUDIES:    PROCEDURE DATE:  12/19/2023          INTERPRETATION:  An AP portable upright chest x-ray was performed   following thoracocentesis.    Comparison is made to 12/18/2023 at 10:56 AM.    Consolidation in the left lower lobe is likely due to atelectasis,   favored over infiltrate and is associated with a left pleural effusion,   slightly diminished from the prior exam. The remaining visualized lungs   are clear bilaterally. There is no evidence of pneumothorax. There is no   pleural effusion on the right. The cardiac silhouette is prominent with   engorgement of central pulmonary veins, atherosclerotic aorta, but with   no pulmonary edema. Chronic degenerative changes of the spine and   shoulders are again seen.    IMPRESSION:  1. Slight decrease in left pleural effusion compared to the prior exam   with no pneumothorax on either side.  2. Persistent consolidation in the left lower lobe, consistent with   atelectasis and/or infiltrate, with coexistent smaller left pleural   effusion.  3. Prominent cardiac silhouette with atherosclerosis, but without CHF.    --- End of Report ---      PROTEIN CALORIE MALNUTRITION PRESENT: [ ]mild [ ]moderate [ ]severe [ ]underweight [ ]morbid obesity  https://www.andeal.org/vault/2440/web/files/ONC/Table_Clinical%20Characteristics%20to%20Document%20Malnutrition-White%20JV%20et%20al%202012.pdf    Height (cm): 167.6 (12-18-23 @ 09:52), 167.6 (11-22-23 @ 17:35), 167.6 (10-15-23 @ 21:52)  Weight (kg): 70.3 (12-18-23 @ 09:52), 75.75 (11-25-23 @ 04:49), 72.6 (10-15-23 @ 21:52)  BMI (kg/m2): 25 (12-18-23 @ 09:52), 27 (11-25-23 @ 04:49), 25.8 (11-22-23 @ 17:35)    [ ]PPSV2 < or = to 30% [ ]significant weight loss  [ ]poor nutritional intake  [ ]anasarca[ ]Artificial Nutrition      Other REFERRALS:  [ ]Hospice  [ ]Child Life  [ ]Social Work  [ ]Case management [ ]Holistic Therapy  HPI:  89y year old M with PMH of 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 Patient with recent hospitalization for CHF exacerbation 11/22-11/27 and he left AMA. Patient is a poor historian and unable to give reliable history. Denies any chest pain, palpitations, N/V, HA, dizziness. Reports he takes his medications most of the time. He is concerned about blisters on his leg but unable to recognize HF exacerbation being the underlying issue. Also reports constipation but denies N/V/D. Reports lack of appetite but denies dysphagia.     Collateral obtained from PCP Dr. Rubio and daughter Frida. PCP reports he stopped lasix and started torsemide 100mg for fluid overload. He is concerned that patient is non compliant with recommendations and medications. Per daughter patient is in sound mind and does not stay in the hospital to complete treatment and signs out AMA. As far as she knows, patient has been compliant with medications. He is still ambulatory and goes to work daily.     Patient with recurrent admissions; palliative care consulted. Pt seen and examined at bedside this morning. He reports difficulty breathing yesterday, partly improved s/p thora yesterday. He denies nausea/vomiting/abd pain but reports pain to his BLE.     PERTINENT PM/SXH:   Afib    Congestive heart failure (CHF)    CVA (cerebral vascular accident)    Hypertension, unspecified type    Chronic obstructive pulmonary disease (COPD)      No significant past surgical history      FAMILY HISTORY:    Family Hx substance abuse [ ]yes [ ]no  ITEMS NOT CHECKED ARE NOT PRESENT    SOCIAL HISTORY:    Children 2 daughters Musa  Anabaptist/Spirituality: Slovenian Yazidi  Substance hx: no  Tobacco hx:  no   Alcohol hx: no  Living Situation: Home with his daughters    ADVANCE DIRECTIVES:    DNR/MOLST- no - FULL CODE  DECISION MAKER(s):   Surrogate(s)       - 2 daughters Frida and Salena  Name(s): Phone Number(s):  [phone]; [phone]    BASELINE (I)ADL(s) (prior to admission):  [place]:  Moderate      Allergies  No Known Allergies    Intolerances    MEDICATIONS  (STANDING):  allopurinol 100 milliGRAM(s) Oral daily  aspirin enteric coated 81 milliGRAM(s) Oral daily  atorvastatin 40 milliGRAM(s) Oral at bedtime  bacitracin   Ointment 1 Application(s) Topical daily  budesonide 160 MICROgram(s)/formoterol 4.5 MICROgram(s) Inhaler 2 Puff(s) Inhalation two times a day  furosemide   Injectable 40 milliGRAM(s) IV Push two times a day  hydrALAZINE 25 milliGRAM(s) Oral every 8 hours  melatonin 3 milliGRAM(s) Oral at bedtime  metoprolol succinate ER 50 milliGRAM(s) Oral daily  pantoprazole    Tablet 40 milliGRAM(s) Oral before breakfast  polyethylene glycol 3350 17 Gram(s) Oral two times a day  senna 2 Tablet(s) Oral at bedtime  tiotropium 2.5 MICROgram(s) Inhaler 2 Puff(s) Inhalation daily    MEDICATIONS  (PRN):  acetaminophen     Tablet .. 650 milliGRAM(s) Oral every 6 hours PRN Temp greater or equal to 38C (100.4F), Mild Pain (1 - 3)  albuterol    90 MICROgram(s) HFA Inhaler 2 Puff(s) Inhalation every 6 hours PRN Shortness of Breath and/or Wheezing    PRESENT SYMPTOMS:    Pain: yes   QOL impact - moderate  Location -  BLE                  Aggravating factors - worse with movement  Quality - does not report  Radiation -  does not report  Timing-   Severity (0-10 scale):  does not report  Minimal acceptable level (0-10 scale):   does not report      Dyspnea:                          Mild    Anxiety:                             does not report  Depressed:                     does not report  Fatigue:                             mild  Nausea:                            none  Loss of appetite:             none  Constipation:                    none      Other Symptoms:  [X ]All other review of systems negative       Chaplaincy Referral:   Deferred   Palliative Performance Status Version 2:     50    %    http://npcrc.org/files/news/palliative_performance_scale_ppsv2.pdf    PHYSICAL EXAM:  Vital Signs Last 24 Hrs  T(C): 36.5 (20 Dec 2023 04:58), Max: 36.6 (19 Dec 2023 20:49)  T(F): 97.7 (20 Dec 2023 04:58), Max: 97.8 (19 Dec 2023 20:49)  HR: 87 (20 Dec 2023 08:17) (69 - 87)  BP: 110/51 (20 Dec 2023 04:58) (106/63 - 117/75)  BP(mean): --  RR: 17 (20 Dec 2023 04:58) (17 - 17)  SpO2: 98% (20 Dec 2023 08:17) (96% - 99%)    Parameters below as of 20 Dec 2023 08:17  Patient On (Oxygen Delivery Method): room air     I&O's Summary    19 Dec 2023 07:01  -  20 Dec 2023 07:00  --------------------------------------------------------  IN: 0 mL / OUT: 500 mL / NET: -500 mL    20 Dec 2023 07:01  -  20 Dec 2023 10:18  --------------------------------------------------------  IN: 120 mL / OUT: 200 mL / NET: -80 mL      GENERAL: disheveled elderly male sitting in chair in NAD  HEENT: NC/AT, MMM  PULMONARY: decreased breath sounds to LLB  CARDIOVASCULAR:  irregularly irregular rhythm  GASTROINTESTINAL: soft, nontender  Last BM:  MUSCULOSKELETAL: +weakness, moves all extremities; significant edema to BLE  NEUROLOGIC: A&Ox3, disgruntled man  SKIN: see nursing assessments for details; bullous formation to RLE, significant discoloration noted      LABS:                        9.6    8.12  )-----------( 154      ( 20 Dec 2023 05:57 )             31.1   12-20    144  |  103  |  83<H>  ----------------------------<  150<H>  4.0   |  34<H>  |  2.09<H>    Ca    8.6      20 Dec 2023 05:57  Phos  4.3     12-19  Mg     2.4     12-19        Urinalysis Basic - ( 20 Dec 2023 05:57 )    Color: x / Appearance: x / SG: x / pH: x  Gluc: 150 mg/dL / Ketone: x  / Bili: x / Urobili: x   Blood: x / Protein: x / Nitrite: x   Leuk Esterase: x / RBC: x / WBC x   Sq Epi: x / Non Sq Epi: x / Bacteria: x      RADIOLOGY & ADDITIONAL STUDIES:    PROCEDURE DATE:  12/19/2023          INTERPRETATION:  An AP portable upright chest x-ray was performed   following thoracocentesis.    Comparison is made to 12/18/2023 at 10:56 AM.    Consolidation in the left lower lobe is likely due to atelectasis,   favored over infiltrate and is associated with a left pleural effusion,   slightly diminished from the prior exam. The remaining visualized lungs   are clear bilaterally. There is no evidence of pneumothorax. There is no   pleural effusion on the right. The cardiac silhouette is prominent with   engorgement of central pulmonary veins, atherosclerotic aorta, but with   no pulmonary edema. Chronic degenerative changes of the spine and   shoulders are again seen.    IMPRESSION:  1. Slight decrease in left pleural effusion compared to the prior exam   with no pneumothorax on either side.  2. Persistent consolidation in the left lower lobe, consistent with   atelectasis and/or infiltrate, with coexistent smaller left pleural   effusion.  3. Prominent cardiac silhouette with atherosclerosis, but without CHF.    --- End of Report ---      PROTEIN CALORIE MALNUTRITION PRESENT: [ ]mild [ ]moderate [ ]severe [ ]underweight [ ]morbid obesity  https://www.andeal.org/vault/2440/web/files/ONC/Table_Clinical%20Characteristics%20to%20Document%20Malnutrition-White%20JV%20et%20al%202012.pdf    Height (cm): 167.6 (12-18-23 @ 09:52), 167.6 (11-22-23 @ 17:35), 167.6 (10-15-23 @ 21:52)  Weight (kg): 70.3 (12-18-23 @ 09:52), 75.75 (11-25-23 @ 04:49), 72.6 (10-15-23 @ 21:52)  BMI (kg/m2): 25 (12-18-23 @ 09:52), 27 (11-25-23 @ 04:49), 25.8 (11-22-23 @ 17:35)    [ ]PPSV2 < or = to 30% [ ]significant weight loss  [ ]poor nutritional intake  [ ]anasarca[ ]Artificial Nutrition      Other REFERRALS:  [ ]Hospice  [ ]Child Life  [ ]Social Work  [ ]Case management [ ]Holistic Therapy  HPI:  89y year old M with PMH of 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 Patient with recent hospitalization for CHF exacerbation 11/22-11/27 and he left AMA. Patient is a poor historian and unable to give reliable history. Denies any chest pain, palpitations, N/V, HA, dizziness. Reports he takes his medications most of the time. He is concerned about blisters on his leg but unable to recognize HF exacerbation being the underlying issue. Also reports constipation but denies N/V/D. Reports lack of appetite but denies dysphagia.     Collateral obtained from PCP Dr. Rubio and daughter Frida. PCP reports he stopped lasix and started torsemide 100mg for fluid overload. He is concerned that patient is non compliant with recommendations and medications. Per daughter patient is in sound mind and does not stay in the hospital to complete treatment and signs out AMA. As far as she knows, patient has been compliant with medications. He is still ambulatory and goes to work daily.     Patient with recurrent admissions; palliative care consulted. Pt seen and examined at bedside this morning. He reports difficulty breathing yesterday, partly improved s/p thora yesterday. He denies nausea/vomiting/abd pain but reports pain to his BLE.     PERTINENT PM/SXH:   Afib    Congestive heart failure (CHF)    CVA (cerebral vascular accident)    Hypertension, unspecified type    Chronic obstructive pulmonary disease (COPD)      No significant past surgical history      FAMILY HISTORY:    Family Hx substance abuse [ ]yes [ ]no  ITEMS NOT CHECKED ARE NOT PRESENT    SOCIAL HISTORY:    Children 2 daughters Musa  Religious/Spirituality: Slovenian Gnosticist  Substance hx: no  Tobacco hx:  yes   Alcohol hx: no  Living Situation: Home with his daughters    ADVANCE DIRECTIVES:    DNR/MOLST- no - FULL CODE  DECISION MAKER(s):   Surrogate(s)       - 2 daughters Frida and Salena  Name(s): Phone Number(s):  [phone]; [phone]    BASELINE (I)ADL(s) (prior to admission):  [place]:  Moderate      Allergies  No Known Allergies    Intolerances    MEDICATIONS  (STANDING):  allopurinol 100 milliGRAM(s) Oral daily  aspirin enteric coated 81 milliGRAM(s) Oral daily  atorvastatin 40 milliGRAM(s) Oral at bedtime  bacitracin   Ointment 1 Application(s) Topical daily  budesonide 160 MICROgram(s)/formoterol 4.5 MICROgram(s) Inhaler 2 Puff(s) Inhalation two times a day  furosemide   Injectable 40 milliGRAM(s) IV Push two times a day  hydrALAZINE 25 milliGRAM(s) Oral every 8 hours  melatonin 3 milliGRAM(s) Oral at bedtime  metoprolol succinate ER 50 milliGRAM(s) Oral daily  pantoprazole    Tablet 40 milliGRAM(s) Oral before breakfast  polyethylene glycol 3350 17 Gram(s) Oral two times a day  senna 2 Tablet(s) Oral at bedtime  tiotropium 2.5 MICROgram(s) Inhaler 2 Puff(s) Inhalation daily    MEDICATIONS  (PRN):  acetaminophen     Tablet .. 650 milliGRAM(s) Oral every 6 hours PRN Temp greater or equal to 38C (100.4F), Mild Pain (1 - 3)  albuterol    90 MICROgram(s) HFA Inhaler 2 Puff(s) Inhalation every 6 hours PRN Shortness of Breath and/or Wheezing    PRESENT SYMPTOMS:    Pain: yes   QOL impact - moderate  Location -  BLE                  Aggravating factors - worse with movement  Quality - does not report  Radiation -  does not report  Timing-   Severity (0-10 scale):  does not report  Minimal acceptable level (0-10 scale):   does not report      Dyspnea:                          Mild    Anxiety:                             does not report  Depressed:                     does not report  Fatigue:                             mild  Nausea:                            none  Loss of appetite:             none  Constipation:                    none      Other Symptoms:  [X ]All other review of systems negative       Chaplaincy Referral:   Deferred   Palliative Performance Status Version 2:     50    %    http://npcrc.org/files/news/palliative_performance_scale_ppsv2.pdf    PHYSICAL EXAM:  Vital Signs Last 24 Hrs  T(C): 36.5 (20 Dec 2023 04:58), Max: 36.6 (19 Dec 2023 20:49)  T(F): 97.7 (20 Dec 2023 04:58), Max: 97.8 (19 Dec 2023 20:49)  HR: 87 (20 Dec 2023 08:17) (69 - 87)  BP: 110/51 (20 Dec 2023 04:58) (106/63 - 117/75)  BP(mean): --  RR: 17 (20 Dec 2023 04:58) (17 - 17)  SpO2: 98% (20 Dec 2023 08:17) (96% - 99%)    Parameters below as of 20 Dec 2023 08:17  Patient On (Oxygen Delivery Method): room air     I&O's Summary    19 Dec 2023 07:01  -  20 Dec 2023 07:00  --------------------------------------------------------  IN: 0 mL / OUT: 500 mL / NET: -500 mL    20 Dec 2023 07:01  -  20 Dec 2023 10:18  --------------------------------------------------------  IN: 120 mL / OUT: 200 mL / NET: -80 mL      GENERAL: disheveled elderly male sitting in chair in NAD  HEENT: NC/AT, MMM  PULMONARY: decreased breath sounds to LLB  CARDIOVASCULAR:  irregularly irregular rhythm  GASTROINTESTINAL: soft, nontender  Last BM:  MUSCULOSKELETAL: +weakness, moves all extremities; significant edema to BLE  NEUROLOGIC: A&Ox3, disgruntled man  SKIN: see nursing assessments for details; bullous formation to RLE, significant discoloration noted      LABS:                        9.6    8.12  )-----------( 154      ( 20 Dec 2023 05:57 )             31.1   12-20    144  |  103  |  83<H>  ----------------------------<  150<H>  4.0   |  34<H>  |  2.09<H>    Ca    8.6      20 Dec 2023 05:57  Phos  4.3     12-19  Mg     2.4     12-19        Urinalysis Basic - ( 20 Dec 2023 05:57 )    Color: x / Appearance: x / SG: x / pH: x  Gluc: 150 mg/dL / Ketone: x  / Bili: x / Urobili: x   Blood: x / Protein: x / Nitrite: x   Leuk Esterase: x / RBC: x / WBC x   Sq Epi: x / Non Sq Epi: x / Bacteria: x      RADIOLOGY & ADDITIONAL STUDIES:    PROCEDURE DATE:  12/19/2023          INTERPRETATION:  An AP portable upright chest x-ray was performed   following thoracocentesis.    Comparison is made to 12/18/2023 at 10:56 AM.    Consolidation in the left lower lobe is likely due to atelectasis,   favored over infiltrate and is associated with a left pleural effusion,   slightly diminished from the prior exam. The remaining visualized lungs   are clear bilaterally. There is no evidence of pneumothorax. There is no   pleural effusion on the right. The cardiac silhouette is prominent with   engorgement of central pulmonary veins, atherosclerotic aorta, but with   no pulmonary edema. Chronic degenerative changes of the spine and   shoulders are again seen.    IMPRESSION:  1. Slight decrease in left pleural effusion compared to the prior exam   with no pneumothorax on either side.  2. Persistent consolidation in the left lower lobe, consistent with   atelectasis and/or infiltrate, with coexistent smaller left pleural   effusion.  3. Prominent cardiac silhouette with atherosclerosis, but without CHF.    --- End of Report ---      PROTEIN CALORIE MALNUTRITION PRESENT: [ ]mild [ ]moderate [ ]severe [ ]underweight [ ]morbid obesity  https://www.andeal.org/vault/2440/web/files/ONC/Table_Clinical%20Characteristics%20to%20Document%20Malnutrition-White%20JV%20et%20al%202012.pdf    Height (cm): 167.6 (12-18-23 @ 09:52), 167.6 (11-22-23 @ 17:35), 167.6 (10-15-23 @ 21:52)  Weight (kg): 70.3 (12-18-23 @ 09:52), 75.75 (11-25-23 @ 04:49), 72.6 (10-15-23 @ 21:52)  BMI (kg/m2): 25 (12-18-23 @ 09:52), 27 (11-25-23 @ 04:49), 25.8 (11-22-23 @ 17:35)    [ ]PPSV2 < or = to 30% [ ]significant weight loss  [ ]poor nutritional intake  [ ]anasarca[ ]Artificial Nutrition      Other REFERRALS:  [ ]Hospice  [ ]Child Life  [ ]Social Work  [ ]Case management [ ]Holistic Therapy  HPI:  89y year old M with PMH of 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 Patient with recent hospitalization for CHF exacerbation 11/22-11/27 and he left AMA. Patient is a poor historian and unable to give reliable history. Denies any chest pain, palpitations, N/V, HA, dizziness. Reports he takes his medications most of the time. He is concerned about blisters on his leg but unable to recognize HF exacerbation being the underlying issue. Also reports constipation but denies N/V/D. Reports lack of appetite but denies dysphagia.     Collateral obtained from PCP Dr. Rubio and daughter Frida. PCP reports he stopped lasix and started torsemide 100mg for fluid overload. He is concerned that patient is non compliant with recommendations and medications. Per daughter patient is in sound mind and does not stay in the hospital to complete treatment and signs out AMA. As far as she knows, patient has been compliant with medications. He is still ambulatory and goes to work daily.     Patient with recurrent admissions; palliative care consulted. Pt seen and examined at bedside this morning. He reports difficulty breathing yesterday, partly improved s/p thora yesterday. He denies nausea/vomiting/abd pain but reports pain to his BLE.     PERTINENT PM/SXH:   Afib    Congestive heart failure (CHF)    CVA (cerebral vascular accident)    Hypertension, unspecified type    Chronic obstructive pulmonary disease (COPD)      No significant past surgical history      FAMILY HISTORY:    Family Hx substance abuse [ ]yes [ ]no  ITEMS NOT CHECKED ARE NOT PRESENT    SOCIAL HISTORY:    Children 2 daughters Musa  Anabaptist/Spirituality: [employer]  Substance hx: no  Tobacco hx:  yes   Alcohol hx: no  Living Situation: Home with his daughters    ADVANCE DIRECTIVES:    DNR/MOLST- no - FULL CODE  DECISION MAKER(s):   Surrogate(s)       - 2 daughters Frida and Salena  Name(s): Phone Number(s):  [phone]; [phone]    BASELINE (I)ADL(s) (prior to admission):  [place]:  Moderate      Allergies  No Known Allergies    Intolerances    MEDICATIONS  (STANDING):  allopurinol 100 milliGRAM(s) Oral daily  aspirin enteric coated 81 milliGRAM(s) Oral daily  atorvastatin 40 milliGRAM(s) Oral at bedtime  bacitracin   Ointment 1 Application(s) Topical daily  budesonide 160 MICROgram(s)/formoterol 4.5 MICROgram(s) Inhaler 2 Puff(s) Inhalation two times a day  furosemide   Injectable 40 milliGRAM(s) IV Push two times a day  hydrALAZINE 25 milliGRAM(s) Oral every 8 hours  melatonin 3 milliGRAM(s) Oral at bedtime  metoprolol succinate ER 50 milliGRAM(s) Oral daily  pantoprazole    Tablet 40 milliGRAM(s) Oral before breakfast  polyethylene glycol 3350 17 Gram(s) Oral two times a day  senna 2 Tablet(s) Oral at bedtime  tiotropium 2.5 MICROgram(s) Inhaler 2 Puff(s) Inhalation daily    MEDICATIONS  (PRN):  acetaminophen     Tablet .. 650 milliGRAM(s) Oral every 6 hours PRN Temp greater or equal to 38C (100.4F), Mild Pain (1 - 3)  albuterol    90 MICROgram(s) HFA Inhaler 2 Puff(s) Inhalation every 6 hours PRN Shortness of Breath and/or Wheezing    PRESENT SYMPTOMS:    Pain: yes   QOL impact - moderate  Location -  BLE                  Aggravating factors - worse with movement  Quality - does not report  Radiation -  does not report  Timing-   Severity (0-10 scale):  does not report  Minimal acceptable level (0-10 scale):   does not report      Dyspnea:                          Mild    Anxiety:                             does not report  Depressed:                     does not report  Fatigue:                             mild  Nausea:                            none  Loss of appetite:             none  Constipation:                    none      Other Symptoms:  [X ]All other review of systems negative       Chaplaincy Referral:   Deferred   Palliative Performance Status Version 2:     50    %    http://npcrc.org/files/news/palliative_performance_scale_ppsv2.pdf    PHYSICAL EXAM:  Vital Signs Last 24 Hrs  T(C): 36.5 (20 Dec 2023 04:58), Max: 36.6 (19 Dec 2023 20:49)  T(F): 97.7 (20 Dec 2023 04:58), Max: 97.8 (19 Dec 2023 20:49)  HR: 87 (20 Dec 2023 08:17) (69 - 87)  BP: 110/51 (20 Dec 2023 04:58) (106/63 - 117/75)  BP(mean): --  RR: 17 (20 Dec 2023 04:58) (17 - 17)  SpO2: 98% (20 Dec 2023 08:17) (96% - 99%)    Parameters below as of 20 Dec 2023 08:17  Patient On (Oxygen Delivery Method): room air     I&O's Summary    19 Dec 2023 07:01  -  20 Dec 2023 07:00  --------------------------------------------------------  IN: 0 mL / OUT: 500 mL / NET: -500 mL    20 Dec 2023 07:01  -  20 Dec 2023 10:18  --------------------------------------------------------  IN: 120 mL / OUT: 200 mL / NET: -80 mL      GENERAL: disheveled elderly male sitting in chair in NAD  HEENT: NC/AT, MMM  PULMONARY: decreased breath sounds to LLB  CARDIOVASCULAR:  irregularly irregular rhythm  GASTROINTESTINAL: soft, nontender  Last BM:  MUSCULOSKELETAL: +weakness, moves all extremities; significant edema to BLE  NEUROLOGIC: A&Ox3, disgruntled man  SKIN: see nursing assessments for details; bullous formation to RLE, significant discoloration noted      LABS:                        9.6    8.12  )-----------( 154      ( 20 Dec 2023 05:57 )             31.1   12-20    144  |  103  |  83<H>  ----------------------------<  150<H>  4.0   |  34<H>  |  2.09<H>    Ca    8.6      20 Dec 2023 05:57  Phos  4.3     12-19  Mg     2.4     12-19        Urinalysis Basic - ( 20 Dec 2023 05:57 )    Color: x / Appearance: x / SG: x / pH: x  Gluc: 150 mg/dL / Ketone: x  / Bili: x / Urobili: x   Blood: x / Protein: x / Nitrite: x   Leuk Esterase: x / RBC: x / WBC x   Sq Epi: x / Non Sq Epi: x / Bacteria: x      RADIOLOGY & ADDITIONAL STUDIES:    PROCEDURE DATE:  12/19/2023          INTERPRETATION:  An AP portable upright chest x-ray was performed   following thoracocentesis.    Comparison is made to 12/18/2023 at 10:56 AM.    Consolidation in the left lower lobe is likely due to atelectasis,   favored over infiltrate and is associated with a left pleural effusion,   slightly diminished from the prior exam. The remaining visualized lungs   are clear bilaterally. There is no evidence of pneumothorax. There is no   pleural effusion on the right. The cardiac silhouette is prominent with   engorgement of central pulmonary veins, atherosclerotic aorta, but with   no pulmonary edema. Chronic degenerative changes of the spine and   shoulders are again seen.    IMPRESSION:  1. Slight decrease in left pleural effusion compared to the prior exam   with no pneumothorax on either side.  2. Persistent consolidation in the left lower lobe, consistent with   atelectasis and/or infiltrate, with coexistent smaller left pleural   effusion.  3. Prominent cardiac silhouette with atherosclerosis, but without CHF.    --- End of Report ---      PROTEIN CALORIE MALNUTRITION PRESENT: [ ]mild [ ]moderate [ ]severe [ ]underweight [ ]morbid obesity  https://www.andeal.org/vault/2440/web/files/ONC/Table_Clinical%20Characteristics%20to%20Document%20Malnutrition-White%20JV%20et%20al%202012.pdf    Height (cm): 167.6 (12-18-23 @ 09:52), 167.6 (11-22-23 @ 17:35), 167.6 (10-15-23 @ 21:52)  Weight (kg): 70.3 (12-18-23 @ 09:52), 75.75 (11-25-23 @ 04:49), 72.6 (10-15-23 @ 21:52)  BMI (kg/m2): 25 (12-18-23 @ 09:52), 27 (11-25-23 @ 04:49), 25.8 (11-22-23 @ 17:35)    [ ]PPSV2 < or = to 30% [ ]significant weight loss  [ ]poor nutritional intake  [ ]anasarca[ ]Artificial Nutrition      Other REFERRALS:  [ ]Hospice  [ ]Child Life  [ ]Social Work  [ ]Case management [ ]Holistic Therapy

## 2023-12-20 NOTE — CONSULT NOTE ADULT - ASSESSMENT
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 Palliative care consulted due to multiple readmissions and CHF.    Dyspnea  - 2/2 Acute on chronic diastolic congestive heart failure  - s/p thora yesterday, will f/u pleural studies  - diuresis per cardio/pulm; pt currently on RA    BLE pain  - pt with blisters and bullous formation to legs  - wound care consult per primary team  - Tylenol PRN; diuresis per cardio/pulm    Acute on chronic diastolic congestive heart failure  - Echo reviewed with EF 50 to 55%. Moderate mitral valve stenosis , borderline pulmonary hypertension.  - cardio/pulm following    Encounter for palliative care  - Introduced the palliative care team to pt at bedside. Attempted to discuss HCP but pt stated his daughters were who would be included in his MDM and changed the topic of conversation. Will continue to build rapport. Pt did not want me to reach out to his daughter at this time.  - Will continue to follow for ongoing GOC conversations and supportive care.

## 2023-12-20 NOTE — PROGRESS NOTE ADULT - ASSESSMENT
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 Need to obtain collateral from family, patient is poor historian  -Home regimen is torsemide 100 daily per PCp Dr. Rubio  -cont IV lasix 40BID  -strict I/os, daily weights, fluid restriction and low salt diet  -monitor on tele  -cardio recs appreciated    #Pleural effusion, left   -worsening left sided effusion  -s/p thoracentesis with 1 L removal during last hospitalization  -cytopath negative for malignancy. Suspect 2/2 HF exacerbation  -recommend aggressive diuresis, if respiratory distress - may need therapeutic thoracentesis.  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    #COPD   --not in exacerbation  -given COPD, [id number]% on greater acceptable. 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 Need to obtain collateral from family, patient is poor historian  -Home regimen is torsemide 100 daily per PCp Dr. Rubio  -cont IV lasix 40BID  -strict I/os, daily weights, fluid restriction and low salt diet  -monitor on tele  -cardio recs appreciated    #Pleural effusion, left   -worsening left sided effusion  -s/p thoracentesis with 1 L removal during last hospitalization  -cytopath negative for malignancy. Suspect 2/2 HF exacerbation  -recommend aggressive diuresis, if respiratory distress - may need therapeutic thoracentesis.  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    #COPD   --not in exacerbation  -given COPD, gpe097% on greater acceptable. Avoid hyperoxygenation   -c/w spiriva, Symbicort and albuterol PRN    #HLD  -cont lipitor    #enterovirus  -supportive care    #HTN  -cont hydralazine  -cont toprol  -cont torsemide    #DVT PPX  -holding eliquis per pulm recs as thoracocentesis was bloody  -unable to give SCDs due to LE blisters     #FULL CODE  -pall consult    Case discussed with Dr Aguilar

## 2023-12-20 NOTE — PROGRESS NOTE ADULT - SUBJECTIVE AND OBJECTIVE BOX
Follow-up Pulmonary Progress Note  Chief Complaint : Urticaria    States breathing is improved. Denies any chest pain.    Allergies :No Known Allergies      PAST MEDICAL & SURGICAL HISTORY:  Afib    Congestive heart failure (CHF)    CVA (cerebral vascular accident)    Hypertension, unspecified type    Chronic obstructive pulmonary disease (COPD)    No significant past surgical history        Medications:  MEDICATIONS  (STANDING):  allopurinol 100 milliGRAM(s) Oral daily  aspirin enteric coated 81 milliGRAM(s) Oral daily  atorvastatin 40 milliGRAM(s) Oral at bedtime  bacitracin   Ointment 1 Application(s) Topical daily  budesonide 160 MICROgram(s)/formoterol 4.5 MICROgram(s) Inhaler 2 Puff(s) Inhalation two times a day  furosemide   Injectable 40 milliGRAM(s) IV Push two times a day  hydrALAZINE 25 milliGRAM(s) Oral every 8 hours  melatonin 3 milliGRAM(s) Oral at bedtime  metoprolol succinate ER 50 milliGRAM(s) Oral daily  pantoprazole    Tablet 40 milliGRAM(s) Oral before breakfast  polyethylene glycol 3350 17 Gram(s) Oral two times a day  senna 2 Tablet(s) Oral at bedtime  tiotropium 2.5 MICROgram(s) Inhaler 2 Puff(s) Inhalation daily    MEDICATIONS  (PRN):  acetaminophen     Tablet .. 650 milliGRAM(s) Oral every 6 hours PRN Temp greater or equal to 38C (100.4F), Mild Pain (1 - 3)  albuterol    90 MICROgram(s) HFA Inhaler 2 Puff(s) Inhalation every 6 hours PRN Shortness of Breath and/or Wheezing      Antibiotics History      Heme Medications   aspirin enteric coated 81 milliGRAM(s) Oral daily, 12-18-23 @ 14:40      GI Medications  pantoprazole    Tablet 40 milliGRAM(s) Oral before breakfast, 12-18-23 @ 14:42, Routine  polyethylene glycol 3350 17 Gram(s) Oral two times a day, 12-19-23 @ 08:53,   senna 2 Tablet(s) Oral at bedtime, 12-18-23 @ 14:17, Routine        LABS:                        9.6    8.12  )-----------( 154      ( 20 Dec 2023 05:57 )             31.1     12-20    144  |  103  |  83<H>  ----------------------------<  150<H>  4.0   |  34<H>  |  2.09<H>    Ca    8.6      20 Dec 2023 05:57  Phos  4.3     12-19  Mg     2.4     12-19      Fluid characteristics  -- 12-19 @ 13:46  pH 7.5    tprot 5.2    Cell count  Appearance Bloody  Fluid type --  BF lymph 33  color Red  eosinophil 3  PMN --  Mesothelial 1  Monocyte 35  Other body cells 0  Urinalysis Basic - ( 20 Dec 2023 05:57 )    Color: x / Appearance: x / SG: x / pH: x  Gluc: 150 mg/dL / Ketone: x  / Bili: x / Urobili: x   Blood: x / Protein: x / Nitrite: x   Leuk Esterase: x / RBC: x / WBC x   Sq Epi: x / Non Sq Epi: x / Bacteria: x    CULTURES: (if applicable)    Culture - Fungal, Body Fluid (collected 12-19-23 @ 13:46)  Source: Pleural Fl Pleural Fluid  Preliminary Report (12-20-23 @ 07:19):    Testing in progress    Culture - Body Fluid with Gram Stain (collected 12-19-23 @ 13:46)  Source: Pleural Fl Pleural Fluid  Gram Stain (12-20-23 @ 02:07):    polymorphonuclear leukocytes seen    No organisms seen    by cytocentrifuge    Rapid RVP Result: Detected (12-18-23 @ 10:15)    VITALS:  T(C): 36.4 (12-20-23 @ 12:31), Max: 36.6 (12-19-23 @ 20:49)  T(F): 97.5 (12-20-23 @ 12:31), Max: 97.8 (12-19-23 @ 20:49)  HR: 71 (12-20-23 @ 12:31) (69 - 87)  BP: 107/41 (12-20-23 @ 12:31) (107/41 - 117/75)  BP(mean): --  ABP: --  ABP(mean): --  RR: 17 (12-20-23 @ 12:31) (17 - 17)  SpO2: 94% (12-20-23 @ 12:31) (94% - 98%)  CVP(mm Hg): --  CVP(cm H2O): --    Ins and Outs     12-19-23 @ 07:01  -  12-20-23 @ 07:00  --------------------------------------------------------  IN: 0 mL / OUT: 500 mL / NET: -500 mL    12-20-23 @ 07:01  -  12-20-23 @ 12:57  --------------------------------------------------------  IN: 120 mL / OUT: 200 mL / NET: -80 mL    Height (cm): 167.6 (12-18-23 @ 09:52)  Weight (kg): 70.3 (12-18-23 @ 09:52)  BMI (kg/m2): 25 (12-18-23 @ 09:52)    I&O's Detail    19 Dec 2023 07:01  -  20 Dec 2023 07:00  --------------------------------------------------------  IN:  Total IN: 0 mL    OUT:    Voided (mL): 500 mL  Total OUT: 500 mL    Total NET: -500 mL    20 Dec 2023 07:01  -  20 Dec 2023 12:57  --------------------------------------------------------  IN:    Oral Fluid: 120 mL  Total IN: 120 mL    OUT:    Voided (mL): 200 mL  Total OUT: 200 mL    Total NET: -80 mL

## 2023-12-21 NOTE — CONSULT NOTE ADULT - SUBJECTIVE AND OBJECTIVE BOX
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 Patient with recent hospitalization for CHF exacerbation 11/22-11/27 and he left AMA. Patient is a poor historian and unable to give reliable history. Denies any chest pain, palpitations, N/V, HA, dizziness. Reports he takes his medications most of the time. He is concerned about blisters on his leg but unable to recognize HF exacerbation being the underlying issue. Also reports constipation but denies N/V/D. Reports lack of appetite but denies dysphagia.     Collateral obtained from PCP Dr. Rubio and daughter Frida. PCP reports he stopped lasix and started torsemide 100mg for fluid overload. He is concerned that patient is non compliant with recommendations and medications. Per daughter patient is in sound mind and does not stay in the hospital to complete treatment and signs out AMA. As far as she knows, patient has been compliant with medications. He is still ambulatory and goes to work daily.       Interval Events:  Called to see and evaluate patient with epistaxis.  He reports that his nose started bleeding this morning, more of a constant flow, from his left nostril.  He was seen by his pulmonologist, Dr. Sr, this morning who noticed the bleeding.  Patient is not very clear with history.    PAST MEDICAL & SURGICAL HISTORY:  Afib  Congestive heart failure (CHF)  CVA (cerebral vascular accident)  Hypertension, unspecified type  Chronic obstructive pulmonary disease (COPD)  No significant past surgical history      Allergies  No Known Allergies      Social History:  Lives with daughters (18 Dec 2023 14:27)      FAMILY HISTORY:      REVIEW OF SYSTEMS:     CONSTITUTIONAL: No weakness, fevers or chills     EYES/ENT: No visual changes;  No vertigo or throat pain, +nosebleed.     NECK: No pain or stiffness     RESPIRATORY: No cough, wheezing, hemoptysis; No shortness of breath     CARDIOVASCULAR: No chest pain or palpitations     GASTROINTESTINAL: No abdominal or epigastric pain. No nausea, vomiting, or hematemesis; No diarrhea or constipation. No melena or hematochezia.     GENITOURINARY: No dysuria, frequency or hematuria     NEUROLOGICAL: No numbness or weakness     SKIN: No itching, burning, rashes, or lesions   All other review of systems is negative unless indicated above.    MEDICATIONS  (STANDING):  allopurinol 100 milliGRAM(s) Oral daily  apixaban 2.5 milliGRAM(s) Oral every 12 hours  aspirin enteric coated 81 milliGRAM(s) Oral daily  atorvastatin 40 milliGRAM(s) Oral at bedtime  bacitracin   Ointment 1 Application(s) Topical daily  budesonide 160 MICROgram(s)/formoterol 4.5 MICROgram(s) Inhaler 2 Puff(s) Inhalation two times a day  furosemide   Injectable 40 milliGRAM(s) IV Push two times a day  hydrALAZINE 25 milliGRAM(s) Oral every 8 hours  melatonin 3 milliGRAM(s) Oral at bedtime  metoprolol succinate ER 50 milliGRAM(s) Oral daily  pantoprazole    Tablet 40 milliGRAM(s) Oral before breakfast  polyethylene glycol 3350 17 Gram(s) Oral two times a day  senna 2 Tablet(s) Oral at bedtime  tiotropium 2.5 MICROgram(s) Inhaler 2 Puff(s) Inhalation daily    MEDICATIONS  (PRN):  acetaminophen     Tablet .. 650 milliGRAM(s) Oral every 6 hours PRN Temp greater or equal to 38C (100.4F), Mild Pain (1 - 3)  albuterol    90 MICROgram(s) HFA Inhaler 2 Puff(s) Inhalation every 6 hours PRN Shortness of Breath and/or Wheezing                            9.4    8.41  )-----------( 146      ( 21 Dec 2023 07:56 )             29.3     12-21    144  |  103  |  93<H>  ----------------------------<  119<H>  4.5   |  30  |  2.03<H>    Ca    8.8      21 Dec 2023 07:56    TPro  7.3  /  Alb  2.7<L>  /  TBili  0.8  /  DBili  x   /  AST  20  /  ALT  16  /  AlkPhos  62  12-21    I&O's Detail    20 Dec 2023 07:01  -  21 Dec 2023 07:00  --------------------------------------------------------  IN:    Oral Fluid: 120 mL  Total IN: 120 mL    OUT:    Voided (mL): 200 mL  Total OUT: 200 mL    Total NET: -80 mL      21 Dec 2023 07:01  -  21 Dec 2023 11:31  --------------------------------------------------------  IN:    Oral Fluid: 120 mL  Total IN: 120 mL    OUT:  Total OUT: 0 mL    Total NET: 120 mL        Vital Signs Last 24 Hrs  T(C): 36.7 (21 Dec 2023 04:56), Max: 36.7 (20 Dec 2023 20:34)  T(F): 98 (21 Dec 2023 04:56), Max: 98 (20 Dec 2023 20:34)  HR: 90 (21 Dec 2023 08:35) (71 - 90)  BP: 123/78 (21 Dec 2023 04:56) (106/52 - 156/68)  BP(mean): --  RR: 17 (21 Dec 2023 04:56) (16 - 17)  SpO2: 95% (21 Dec 2023 08:35) (94% - 95%)    Parameters below as of 21 Dec 2023 08:35  Patient On (Oxygen Delivery Method): room air      CBC Full  -  ( 21 Dec 2023 07:56 )  WBC Count : 8.41 K/uL  RBC Count : 2.89 M/uL  Hemoglobin : 9.4 g/dL  Hematocrit : 29.3 %  Platelet Count - Automated : 146 K/uL  Mean Cell Volume : 101.4 fl  Mean Cell Hemoglobin : 32.5 pg  Mean Cell Hemoglobin Concentration : 32.1 gm/dL  Auto Neutrophil # : 5.67 K/uL  Auto Lymphocyte # : 1.20 K/uL  Auto Monocyte # : 1.22 K/uL  Auto Eosinophil # : 0.26 K/uL  Auto Basophil # : 0.02 K/uL  Auto Neutrophil % : 67.4 %  Auto Lymphocyte % : 14.3 %  Auto Monocyte % : 14.5 %  Auto Eosinophil % : 3.1 %  Auto Basophil % : 0.2 %        PHYSICAL EXAM:     Constitutional Normal: well nourished, well developed, non-dysmorphic, no acute distress     Psychiatric: age appropriate behavior, cooperative     Skin: no obvious skin lesions     Head: Normocephalic, Atraumatic     Lymphatic: no cervical lymphadenopathy     ENT:        External Ear:  No obvious lesions        External Nose:  Normal, no structural deformities		        Anterior Nasal Cavity: Normal mucosa, no turbinate hypertrophy, straight septum, +epistaxis from left nostril, source of bleeding not visualized.     Oral Cavity:  Good dentition, tongue midline, no lesions or ulcerations, uvula midline     Neck: No palpable lymphadenopathy     Pulmonary: No Acute Distress.      CV: no peripheral edema/cyanosis     GI: +distended, nontender     Peripheral vascular: no JVD or edema     Neurologic: awake and alert, no obvious facial weakness      PROCEDURE:     Left nostril suctioned with some difficulty at bedside - patient not able to tolerate agressive suctioning.  Cotton ball saturated with Afrin and applied in the left nostril twice.  Bleeding is light and not continuous.  Bleeding was better controlled after the second Afrin saturated cotton ball.  Surgicel with Afrin inserted deep in patient's left nostril.  He tolerated the procedure with some difficulty.

## 2023-12-21 NOTE — PROGRESS NOTE ADULT - NUTRITIONAL ASSESSMENT
This patient has been assessed with a concern for Malnutrition and has been determined to have a diagnosis/diagnoses of Severe protein-calorie malnutrition.    This patient is being managed with:   Diet Regular-  1000mL Fluid Restriction ([id number])  Low Sodium  Piero(7 Gm Arginine/7 Gm Glut/1.2 Gm HMB     Qty per Day:  2  Supplement Feeding Modality:  Oral  Ensure Plus High Protein Cans or Servings Per Day:  1       Frequency:  Daily  Entered: Dec 19 2023 11:11AM   This patient has been assessed with a concern for Malnutrition and has been determined to have a diagnosis/diagnoses of Severe protein-calorie malnutrition.    This patient is being managed with:   Diet Regular-  1000mL Fluid Restriction ([id number])  Low Sodium  Piero(7 Gm Arginine/7 Gm Glut/1.2 Gm HMB     Qty per Day:  2  Supplement Feeding Modality:  Oral  Ensure Plus High Protein Cans or Servings Per Day:  1       Frequency:  Daily  Entered: Dec 19 2023 11:11AM

## 2023-12-21 NOTE — PROGRESS NOTE ADULT - ASSESSMENT
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 Need to obtain collateral from family, patient is poor historian  -Home regimen is torsemide 100 daily per PCp Dr. Rubio  -cont IV lasix 40BID  -strict I/os, daily weights, fluid restriction and low salt diet  -monitor on tele  -cardio recs appreciated    #Pleural effusion, left   -worsening left sided effusion  -s/p thoracentesis with 1 L removal during last hospitalization  -cytopath negative for malignancy. Suspect 2/2 HF exacerbation  -recommend aggressive diuresis, if respiratory distress - may need therapeutic thoracentesis.  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    #COPD   --not in exacerbation  -given COPD, nrd135% on greater acceptable. 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 Need to obtain collateral from family, patient is poor historian  -Home regimen is torsemide 100 daily per PCp Dr. Rubio  -cont IV lasix 40BID  -strict I/os, daily weights, fluid restriction and low salt diet  -monitor on tele  -cardio recs appreciated    #Pleural effusion, left   -worsening left sided effusion  -s/p thoracentesis with 1 L removal during last hospitalization  -cytopath negative for malignancy. Suspect 2/2 HF exacerbation  -recommend aggressive diuresis, if respiratory distress - may need therapeutic thoracentesis.  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    #COPD   --not in exacerbation  -given COPD, xlu090% on greater acceptable. 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 Need to obtain collateral from family, patient is poor historian  -Home regimen is torsemide 100 daily per PCP Dr. Rubio  -increased IV lasix 60 BID  -strict I/os, daily weights, fluid restriction and low salt diet  -monitor on tele  -cardio recs appreciated    #Pleural effusion, left   -worsening left sided effusion  -s/p thoracentesis with 1 L removal during last hospitalization  -cytopath negative for malignancy. Suspect 2/2 HF exacerbation  -recommend aggressive diuresis, if respiratory distress - may need therapeutic thoracentesis.  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    #COPD   -not in exacerbation  -given COPD, jgh265% on greater acceptable. 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 Need to obtain collateral from family, patient is poor historian  -Home regimen is torsemide 100 daily per PCP Dr. Rubio  -increased IV lasix 60 BID  -strict I/os, daily weights, fluid restriction and low salt diet  -monitor on tele  -cardio recs appreciated    #Pleural effusion, left   -worsening left sided effusion  -s/p thoracentesis with 1 L removal during last hospitalization  -cytopath negative for malignancy. Suspect 2/2 HF exacerbation  -recommend aggressive diuresis, if respiratory distress - may need therapeutic thoracentesis.  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    #COPD   -not in exacerbation  -given COPD, hrn557% on greater acceptable. Avoid hyperoxygenation   -c/w spiriva, Symbicort and albuterol PRN  - pulm recommendations appreciated    #HLD  -cont lipitor    #enterovirus  -supportive care    #HTN  -cont hydralazine  -cont toprol  -cont torsemide    #DVT PPX  -holding eliquis per pulm recs as thoracocentesis was bloody  -unable to give SCDs due to LE blisters     #FULL CODE  -palliative consulted    Case discussed with Dr Gilliland

## 2023-12-21 NOTE — PROGRESS NOTE ADULT - SUBJECTIVE AND OBJECTIVE BOX
Follow-up Pulmonary Progress Note  Chief Complaint : Urticaria    Reported with epistaxis. No shortness of breath    Allergies :No Known Allergies      PAST MEDICAL & SURGICAL HISTORY:  Afib    Congestive heart failure (CHF)    CVA (cerebral vascular accident)    Hypertension, unspecified type    Chronic obstructive pulmonary disease (COPD)    No significant past surgical history        Medications:  MEDICATIONS  (STANDING):  allopurinol 100 milliGRAM(s) Oral daily  apixaban 2.5 milliGRAM(s) Oral every 12 hours  aspirin enteric coated 81 milliGRAM(s) Oral daily  atorvastatin 40 milliGRAM(s) Oral at bedtime  bacitracin   Ointment 1 Application(s) Topical daily  budesonide 160 MICROgram(s)/formoterol 4.5 MICROgram(s) Inhaler 2 Puff(s) Inhalation two times a day  furosemide   Injectable 60 milliGRAM(s) IV Push two times a day  hydrALAZINE 25 milliGRAM(s) Oral every 8 hours  melatonin 3 milliGRAM(s) Oral at bedtime  metoprolol succinate ER 50 milliGRAM(s) Oral daily  pantoprazole    Tablet 40 milliGRAM(s) Oral before breakfast  polyethylene glycol 3350 17 Gram(s) Oral two times a day  senna 2 Tablet(s) Oral at bedtime  tiotropium 2.5 MICROgram(s) Inhaler 2 Puff(s) Inhalation daily    MEDICATIONS  (PRN):  acetaminophen     Tablet .. 650 milliGRAM(s) Oral every 6 hours PRN Temp greater or equal to 38C (100.4F), Mild Pain (1 - 3)  albuterol    90 MICROgram(s) HFA Inhaler 2 Puff(s) Inhalation every 6 hours PRN Shortness of Breath and/or Wheezing      Antibiotics History      Heme Medications   apixaban 2.5 milliGRAM(s) Oral every 12 hours, 12-20-23 @ 14:46  aspirin enteric coated 81 milliGRAM(s) Oral daily, 12-18-23 @ 14:40      GI Medications  pantoprazole    Tablet 40 milliGRAM(s) Oral before breakfast, 12-18-23 @ 14:42, Routine  polyethylene glycol 3350 17 Gram(s) Oral two times a day, 12-19-23 @ 08:53,   senna 2 Tablet(s) Oral at bedtime, 12-18-23 @ 14:17, Routine        LABS:                        9.4    8.41  )-----------( 146      ( 21 Dec 2023 07:56 )             29.3     12-21    144  |  103  |  93<H>  ----------------------------<  119<H>  4.5   |  30  |  2.03<H>    Ca    8.8      21 Dec 2023 07:56    TPro  7.3  /  Alb  2.7<L>  /  TBili  0.8  /  DBili  x   /  AST  20  /  ALT  16  /  AlkPhos  62  12-21    Fluid characteristics  -- 12-19 @ 13:46  pH 7.5    tprot 5.2    Cell count  Appearance Bloody  Fluid type --  BF lymph 33  color Red  eosinophil 3  PMN --  Mesothelial 1  Monocyte 35  Other body cells 0            Urinalysis Basic - ( 21 Dec 2023 07:56 )    Color: x / Appearance: x / SG: x / pH: x  Gluc: 119 mg/dL / Ketone: x  / Bili: x / Urobili: x   Blood: x / Protein: x / Nitrite: x   Leuk Esterase: x / RBC: x / WBC x   Sq Epi: x / Non Sq Epi: x / Bacteria: x              CULTURES: (if applicable)    Culture - Fungal, Body Fluid (collected 12-19-23 @ 13:46)  Source: Pleural Fl Pleural Fluid  Preliminary Report (12-20-23 @ 07:19):    Testing in progress    Culture - Body Fluid with Gram Stain (collected 12-19-23 @ 13:46)  Source: Pleural Fl Pleural Fluid  Gram Stain (12-20-23 @ 02:07):    polymorphonuclear leukocytes seen    No organisms seen    by cytocentrifuge  Preliminary Report (12-20-23 @ 19:27):    No growth      Rapid RVP Result: Detected (12-18-23 @ 10:15)        CAPILLARY BLOOD GLUCOSE          RADIOLOGY  CXR:      CT:    ECHO:      VITALS:  T(C): 36.7 (12-21-23 @ 04:56), Max: 36.7 (12-20-23 @ 20:34)  T(F): 98 (12-21-23 @ 04:56), Max: 98 (12-20-23 @ 20:34)  HR: 90 (12-21-23 @ 08:35) (71 - 90)  BP: 123/78 (12-21-23 @ 04:56) (106/52 - 156/68)  BP(mean): --  ABP: --  ABP(mean): --  RR: 17 (12-21-23 @ 04:56) (16 - 17)  SpO2: 95% (12-21-23 @ 08:35) (94% - 95%)  CVP(mm Hg): --  CVP(cm H2O): --    Ins and Outs     12-20-23 @ 07:01  -  12-21-23 @ 07:00  --------------------------------------------------------  IN: 120 mL / OUT: 200 mL / NET: -80 mL    12-21-23 @ 07:01  -  12-21-23 @ 12:27  --------------------------------------------------------  IN: 120 mL / OUT: 0 mL / NET: 120 mL                I&O's Detail    20 Dec 2023 07:01  -  21 Dec 2023 07:00  --------------------------------------------------------  IN:    Oral Fluid: 120 mL  Total IN: 120 mL    OUT:    Voided (mL): 200 mL  Total OUT: 200 mL    Total NET: -80 mL      21 Dec 2023 07:01  -  21 Dec 2023 12:27  --------------------------------------------------------  IN:    Oral Fluid: 120 mL  Total IN: 120 mL    OUT:  Total OUT: 0 mL    Total NET: 120 mL

## 2023-12-21 NOTE — PROGRESS NOTE ADULT - TIME BILLING
PEx. Reviewed pt's labs this AM and CT chest - discussed results with family at bedside. Discussed pt clinical status and GOC conversations with primary team Dr. Gilliland and resident Dr. Ayon.

## 2023-12-21 NOTE — PROGRESS NOTE ADULT - ASSESSMENT
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 Palliative care consulted due to multiple readmissions and CHF.    Dyspnea  - 2/2 Acute on chronic diastolic congestive heart failure  - s/p thora, will f/u pleural studies  - diuresis per cardio/pulm; pt currently on RA    BLE pain  - pt with blisters and bullous formation to legs  - wound care consult per primary team  - Tylenol PRN; diuresis per cardio/pulm    Acute on chronic diastolic congestive heart failure  - Echo reviewed with EF 50 to 55%. Moderate mitral valve stenosis , borderline pulmonary hypertension.  - cardio/pulm following    Encounter for palliative care  - Spoke to pt's close family friend whom he calls a daughter Salena at bedside and discussed his clinical status. See GOC note above. Pt remains FULL CODE  - Will continue to follow for ongoing GOC conversations and supportive care.

## 2023-12-21 NOTE — PROGRESS NOTE ADULT - NUTRITIONAL ASSESSMENT
This patient has been assessed with a concern for Malnutrition and has been determined to have a diagnosis/diagnoses of Severe protein-calorie malnutrition.    This patient is being managed with:   Diet Regular-  1000mL Fluid Restriction ([id number])  Low Sodium  Piero(7 Gm Arginine/7 Gm Glut/1.2 Gm HMB     Qty per Day:  2  Supplement Feeding Modality:  Oral  Ensure Plus High Protein Cans or Servings Per Day:  1       Frequency:  Daily  Entered: Dec 19 2023 11:11AM   This patient has been assessed with a concern for Malnutrition and has been determined to have a diagnosis/diagnoses of Severe protein-calorie malnutrition.    This patient is being managed with:   Diet Regular-  1000mL Fluid Restriction ([id number])  Low Sodium  Piero(7 Gm Arginine/7 Gm Glut/1.2 Gm HMB     Qty per Day:  2  Supplement Feeding Modality:  Oral  Ensure Plus High Protein Cans or Servings Per Day:  1       Frequency:  Daily  Entered: Dec 19 2023 11:11AM

## 2023-12-21 NOTE — CONSULT NOTE ADULT - REASON FOR ADMISSION
Shortness of breath/CHF exacerbation

## 2023-12-21 NOTE — PROGRESS NOTE ADULT - ASSESSMENT
Assessment:  Jaylon Antony is an 89 year old man with past medical history of Atrial fibrillation (on Eliquis), HFrEF (LVEF 25-30% in 2019), Hypertension, Chronic kidney disease, COPD, history of CVA with recurrent hospitalizations for congestive heart failure likely from medication noncompliance now presents with progressive lower extremity edema and shortness of breath, suggestive of acute on chronic diastolic heart failure exacerbation.     ECG consistent with atrial fibrillation and nonspecific ST abnormalities. Troponins are not elevated in range that would suggest an acute coronary syndrome. Pro BNP elevated but less than prior CHF hospitalization. Chest xray with increasing left effusion. The patient is not able to explain what medications he takes and there is concern for medication noncompliance.     Repeat echo with LVEF 50-55% and moderate mitral valve stenosis.     Recommendations:  [] HFpEF, Moderate mitral stenosis: Likely secondary to component of noncompliance. s/p thoracentesis with 1L fluid removed, follow up fluid studies and Pulmonary.  Lasix increased to 60 ivp per primary team (pt now on Torsemide at home).Monitor renal function. If renal function stabilizes then may consider SGLT2-I this admission. Patient not interested in surgical procedures for treatment for the mitral stenosis due to his age of 89.  [] Atrial fibrillation: Continue beta blocker, resume Eliquis for stroke prophylaxis once safe per Pulmonary . Monitor bleeding and hgb, pt with small, scant nosebleed today    Carlotta CONNELLY-BC           Assessment:  Jaylon Antony is an 89 year old man with past medical history of Atrial fibrillation (on Eliquis), HFrEF (LVEF 25-30% in 2019), Hypertension, Chronic kidney disease, COPD, history of CVA with recurrent hospitalizations for congestive heart failure likely from medication noncompliance now presents with progressive lower extremity edema and shortness of breath, suggestive of acute on chronic diastolic heart failure exacerbation.     ECG consistent with atrial fibrillation and nonspecific ST abnormalities. Troponins are not elevated in range that would suggest an acute coronary syndrome. Pro BNP elevated but less than prior CHF hospitalization. Chest xray with increasing left effusion. The patient is not able to explain what medications he takes and there is concern for medication noncompliance.     Repeat echo with LVEF 50-55% and moderate mitral valve stenosis.     Recommendations:  [] HFpEF, Moderate mitral stenosis: Likely secondary to component of noncompliance. s/p thoracentesis with 1L fluid removed, follow up fluid studies and Pulmonary.  Lasix increased to 60 ivp per primary team (pt now on Torsemide at home).Monitor renal function. If renal function stabilizes then may consider SGLT2-I this admission. Patient not interested in surgical procedures for treatment for the mitral stenosis due to his age of 89.  [] Atrial fibrillation: Continue beta blocker, resume Eliquis for stroke prophylaxis once safe per Pulmonary . Monitor bleeding and hgb, pt with small, scant nosebleed today    Carlotta Sandoval [facility]      Cardio attending GFR 30 hemoglobin 9.4 hematocrit 29 BNP 2604  heart failure with preserved ejection fraction atrial fibrillation . patient now s/p thoracentesis left-sided evaluate.   anticoagulation as tolerated check cytology    I discussed with Dr. Hong and Ms. Nguyen         Assessment:  Jaylon Antony is an 89 year old man with past medical history of Atrial fibrillation (on Eliquis), HFrEF (LVEF 25-30% in 2019), Hypertension, Chronic kidney disease, COPD, history of CVA with recurrent hospitalizations for congestive heart failure likely from medication noncompliance now presents with progressive lower extremity edema and shortness of breath, suggestive of acute on chronic diastolic heart failure exacerbation.     ECG consistent with atrial fibrillation and nonspecific ST abnormalities. Troponins are not elevated in range that would suggest an acute coronary syndrome. Pro BNP elevated but less than prior CHF hospitalization. Chest xray with increasing left effusion. The patient is not able to explain what medications he takes and there is concern for medication noncompliance.     Repeat echo with LVEF 50-55% and moderate mitral valve stenosis.     Recommendations:  [] HFpEF, Moderate mitral stenosis: Likely secondary to component of noncompliance. s/p thoracentesis with 1L fluid removed, follow up fluid studies and Pulmonary.  Lasix increased to 60 ivp per primary team (pt now on Torsemide at home).Monitor renal function. If renal function stabilizes then may consider SGLT2-I this admission. Patient not interested in surgical procedures for treatment for the mitral stenosis due to his age of 89.  [] Atrial fibrillation: Continue beta blocker, resume Eliquis for stroke prophylaxis once safe per Pulmonary . Monitor bleeding and hgb, pt with small, scant nosebleed today    Carlotta Sandoval [facility]-BC      Cardio attending GFR 30 hemoglobin 9.4 hematocrit 29 BNP 2604  heart failure with preserved ejection fraction atrial fibrillation . patient now s/p thoracentesis left-sided evaluate.   anticoagulation as tolerated check cytology    I discussed with Dr. Hong and Ms. Nguyen

## 2023-12-21 NOTE — CONSULT NOTE ADULT - PROBLEM SELECTOR RECOMMENDATION 9
-  Saline spray to both nostrils 3-4x daily  -  Surgicel packing in left nostril should dissolve on its own.  -  Will follow with patient.

## 2023-12-21 NOTE — CONSULT NOTE ADULT - CONSULT REASON
Abnormal CXR  SOB
epistaxis
CHF
recurrent admissions for pleural effusions. possible malignancy. non-compliant with meds

## 2023-12-21 NOTE — PROGRESS NOTE ADULT - CONVERSATION DETAILS
Spoke with Salena at bedside who was in business with Jaylon but not actually his daughter. She is one of his preferred point of contact however. We discussed his recurrent pleural effusions and she asked about possibly placing a drain for him. We discussed pleurx caths and awaiting the pleural fluid studies. We discussed the possibility of malignancy and if that was talked about. Salena thought it was best not to tell the pt if it was but we discussed that would ultimately be up to the pt. We also discussed code status and she expressed he is difficult when having these types of conversations. She would prefer if he were to have a natural passing without aggressive measures such as compressions/intubation in the event of cardiac/respiratory failure. Salena stated she would discuss this with the pt's daughter. Supportive care given. Pt remains FULL CODE

## 2023-12-21 NOTE — PROGRESS NOTE ADULT - ATTENDING COMMENTS
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 Evaluated and intervened by ENT ARMAAN Lua     HFpEF exacerbation   - s/p thoracentesis with 1L removed 12/19. follow fluid cytology   - increase lasix to 60mg ivp bid (was taking home torsemide 100)  - Pulm following     Epistaxis   - ENT eval noted   - saline irrigation     A-fib   - Eliquis   - rate control with toprol     Patient has been non-complaint with meds and follow up appts

## 2023-12-21 NOTE — PROGRESS NOTE ADULT - SUBJECTIVE AND OBJECTIVE BOX
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 Patient with recent hospitalization for CHF exacerbation 11/22-11/27 and he left AMA. Patient is a poor historian and unable to give reliable history. Denies any chest pain, palpitations, N/V, HA, dizziness. Reports he takes his medications most of the time. He is concerned about blisters on his leg but unable to recognize HF exacerbation being the underlying issue. Also reports constipation but denies N/V/D. Reports lack of appetite but denies dysphagia.       Patient is a 89y old  Male who presents with a chief complaint of Shortness of breath/CHF exacerbation (20 Dec 2023 12:56)      INTERVAL HPI: Patient seen and examined at bedside. Says he wasn't able to sleep last night.      MEDICATIONS  (STANDING):  allopurinol 100 milliGRAM(s) Oral daily  apixaban 2.5 milliGRAM(s) Oral every 12 hours  aspirin enteric coated 81 milliGRAM(s) Oral daily  atorvastatin 40 milliGRAM(s) Oral at bedtime  bacitracin   Ointment 1 Application(s) Topical daily  budesonide 160 MICROgram(s)/formoterol 4.5 MICROgram(s) Inhaler 2 Puff(s) Inhalation two times a day  furosemide   Injectable 40 milliGRAM(s) IV Push two times a day  hydrALAZINE 25 milliGRAM(s) Oral every 8 hours  melatonin 3 milliGRAM(s) Oral at bedtime  metoprolol succinate ER 50 milliGRAM(s) Oral daily  pantoprazole    Tablet 40 milliGRAM(s) Oral before breakfast  polyethylene glycol 3350 17 Gram(s) Oral two times a day  senna 2 Tablet(s) Oral at bedtime  tiotropium 2.5 MICROgram(s) Inhaler 2 Puff(s) Inhalation daily    MEDICATIONS  (PRN):  acetaminophen     Tablet .. 650 milliGRAM(s) Oral every 6 hours PRN Temp greater or equal to 38C (100.4F), Mild Pain (1 - 3)  albuterol    90 MICROgram(s) HFA Inhaler 2 Puff(s) Inhalation every 6 hours PRN Shortness of Breath and/or Wheezing      Allergies    No Known Allergies    Intolerances        REVIEW OF SYSTEMS:  CONSTITUTIONAL: No weakness, fevers or chills  EYES/ENT: No visual changes;  No vertigo or throat pain   NECK: No pain or stiffness  RESPIRATORY: improving shortness of breath   CARDIOVASCULAR: No chest pain or palpitations  GASTROINTESTINAL: No abdominal or epigastric pain. No nausea, vomiting, or hematemesis; No diarrhea or constipation. No melena or hematochezia.  GENITOURINARY: No dysuria, frequency or hematuria  NEUROLOGICAL: No numbness or weakness  SKIN: +blisters b/l   All other review of systems is negative unless indicated above    Vital Signs Last 24 Hrs  T(C): 36.7 (21 Dec 2023 04:56), Max: 36.7 (20 Dec 2023 20:34)  T(F): 98 (21 Dec 2023 04:56), Max: 98 (20 Dec 2023 20:34)  HR: 90 (21 Dec 2023 08:35) (71 - 90)  BP: 123/78 (21 Dec 2023 04:56) (106/52 - 156/68)  BP(mean): --  RR: 17 (21 Dec 2023 04:56) (16 - 17)  SpO2: 95% (21 Dec 2023 08:35) (94% - 95%)    Parameters below as of 21 Dec 2023 08:35  Patient On (Oxygen Delivery Method): room air      I&O's Summary    20 Dec 2023 07:01  -  21 Dec 2023 07:00  --------------------------------------------------------  IN: 120 mL / OUT: 200 mL / NET: -80 mL    21 Dec 2023 07:01  -  21 Dec 2023 09:31  --------------------------------------------------------  IN: 120 mL / OUT: 0 mL / NET: 120 mL      BMI (kg/m2): 25 (12-18-23 @ 09:52)    PHYSICAL EXAM:  Constitutional: Pt lying in bed, awake and alert, NAD  HEENT: EOMI, normal hearing, moist mucous membranes  Neck: Soft and supple, no JVD  Respiratory: CTABL, No wheezing, bibasilar rales noted worse on L  Cardiovascular: S1S2+, RRR, no M/G/R  Gastrointestinal: BS+, soft, NT/ND, no guarding, no rebound  Extremities: 2+ pitting edema b/l LE, LE blisters noted b/l  Vascular: 2+ peripheral pulses  Neurological: AAOx3, no focal deficits      LABS: Personally reviewed  CBC                        9.4    8.41  )-----------( 146      ( 21 Dec 2023 07:56 )             29.3     CMP  12-21    144  |  103  |  93  ----------------------------<  119  4.5   |  30  |  2.03    Ca    8.8      21 Dec 2023 07:56  Phos  4.3     12-19  Mg     2.4     12-19    TPro  7.3  /  Alb  2.7  /  TBili  0.8  /  DBili  x   /  AST  20  /  ALT  16  /  AlkPhos  62  12-21          PT/INR - ( 18 Dec 2023 10:15 )   PT: 19.1 sec;   INR: 1.66 ratio         PTT - ( 18 Dec 2023 10:15 )  PTT:36.4 sec      CARDIAC MARKERS ( 18 Dec 2023 10:15 )  x     / 58.8 ng/L / x     / x     / x                            Urinalysis Basic - ( 21 Dec 2023 07:56 )    Color: x / Appearance: x / SG: x / pH: x  Gluc: 119 mg/dL / Ketone: x  / Bili: x / Urobili: x   Blood: x / Protein: x / Nitrite: x   Leuk Esterase: x / RBC: x / WBC x   Sq Epi: x / Non Sq Epi: x / Bacteria: x        Culture - Fungal, Body Fluid (collected 19 Dec 2023 13:46)  Source: Pleural Fl Pleural Fluid  Preliminary Report (20 Dec 2023 07:19):    Testing in progress    Culture - Body Fluid with Gram Stain (collected 19 Dec 2023 13:46)  Source: Pleural Fl Pleural Fluid  Gram Stain (20 Dec 2023 02:07):    polymorphonuclear leukocytes seen    No organisms seen    by cytocentrifuge  Preliminary Report (20 Dec 2023 19:27):    No growth            Culture - Fungal, Body Fluid (collected 12-19-23 @ 13:46)  Source: Pleural Fl Pleural Fluid  Preliminary Report (12-20-23 @ 07:19):    Testing in progress    Culture - Body Fluid with Gram Stain (collected 12-19-23 @ 13:46)  Source: Pleural Fl Pleural Fluid  Gram Stain (12-20-23 @ 02:07):    polymorphonuclear leukocytes seen    No organisms seen    by cytocentrifuge  Preliminary Report (12-20-23 @ 19:27):    No growth        RADIOLOGY & ADDITIONAL TESTS: Personally reviewed.     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 Patient with recent hospitalization for CHF exacerbation 11/22-11/27 and he left AMA. Patient is a poor historian and unable to give reliable history. Denies any chest pain, palpitations, N/V, HA, dizziness. Reports he takes his medications most of the time. He is concerned about blisters on his leg but unable to recognize HF exacerbation being the underlying issue. Also reports constipation but denies N/V/D. Reports lack of appetite but denies dysphagia.       Patient is a 89y old  Male who presents with a chief complaint of Shortness of breath/CHF exacerbation (20 Dec 2023 12:56)      INTERVAL HPI: Patient seen and examined at bedside. Says he wasn't able to sleep last night. Pt now has nosebleed.      MEDICATIONS  (STANDING):  allopurinol 100 milliGRAM(s) Oral daily  apixaban 2.5 milliGRAM(s) Oral every 12 hours  aspirin enteric coated 81 milliGRAM(s) Oral daily  atorvastatin 40 milliGRAM(s) Oral at bedtime  bacitracin   Ointment 1 Application(s) Topical daily  budesonide 160 MICROgram(s)/formoterol 4.5 MICROgram(s) Inhaler 2 Puff(s) Inhalation two times a day  furosemide   Injectable 40 milliGRAM(s) IV Push two times a day  hydrALAZINE 25 milliGRAM(s) Oral every 8 hours  melatonin 3 milliGRAM(s) Oral at bedtime  metoprolol succinate ER 50 milliGRAM(s) Oral daily  pantoprazole    Tablet 40 milliGRAM(s) Oral before breakfast  polyethylene glycol 3350 17 Gram(s) Oral two times a day  senna 2 Tablet(s) Oral at bedtime  tiotropium 2.5 MICROgram(s) Inhaler 2 Puff(s) Inhalation daily    MEDICATIONS  (PRN):  acetaminophen     Tablet .. 650 milliGRAM(s) Oral every 6 hours PRN Temp greater or equal to 38C (100.4F), Mild Pain (1 - 3)  albuterol    90 MICROgram(s) HFA Inhaler 2 Puff(s) Inhalation every 6 hours PRN Shortness of Breath and/or Wheezing      Allergies    No Known Allergies    Intolerances        REVIEW OF SYSTEMS:  CONSTITUTIONAL: No weakness, fevers or chills  EYES/ENT: No visual changes;  No vertigo or throat pain   NECK: No pain or stiffness  RESPIRATORY: improving shortness of breath   CARDIOVASCULAR: No chest pain or palpitations  GASTROINTESTINAL: No abdominal or epigastric pain. No nausea, vomiting, or hematemesis; No diarrhea or constipation. No melena or hematochezia.  GENITOURINARY: No dysuria, frequency or hematuria  NEUROLOGICAL: No numbness or weakness  SKIN: +blisters b/l   All other review of systems is negative unless indicated above    Vital Signs Last 24 Hrs  T(C): 36.7 (21 Dec 2023 04:56), Max: 36.7 (20 Dec 2023 20:34)  T(F): 98 (21 Dec 2023 04:56), Max: 98 (20 Dec 2023 20:34)  HR: 90 (21 Dec 2023 08:35) (71 - 90)  BP: 123/78 (21 Dec 2023 04:56) (106/52 - 156/68)  BP(mean): --  RR: 17 (21 Dec 2023 04:56) (16 - 17)  SpO2: 95% (21 Dec 2023 08:35) (94% - 95%)    Parameters below as of 21 Dec 2023 08:35  Patient On (Oxygen Delivery Method): room air      I&O's Summary    20 Dec 2023 07:01  -  21 Dec 2023 07:00  --------------------------------------------------------  IN: 120 mL / OUT: 200 mL / NET: -80 mL    21 Dec 2023 07:01  -  21 Dec 2023 09:31  --------------------------------------------------------  IN: 120 mL / OUT: 0 mL / NET: 120 mL      BMI (kg/m2): 25 (12-18-23 @ 09:52)      PHYSICAL EXAM:  Constitutional: Pt lying in bed, awake and alert, NAD  HEENT: EOMI, normal hearing, moist mucous membranes  Neck: Soft and supple, no JVD  Respiratory: CTABL, No wheezing, bibasilar rales noted worse on L  Cardiovascular: S1S2+, RRR, no M/G/R  Gastrointestinal: BS+, soft, NT/ND, no guarding, no rebound  Extremities: 2+ pitting edema b/l LE, LE blisters noted b/l  Vascular: 2+ peripheral pulses  Neurological: AAOx3, no focal deficits      LABS: Personally reviewed  CBC                        9.4    8.41  )-----------( 146      ( 21 Dec 2023 07:56 )             29.3     CMP  12-21    144  |  103  |  93  ----------------------------<  119  4.5   |  30  |  2.03    Ca    8.8      21 Dec 2023 07:56  Phos  4.3     12-19  Mg     2.4     12-19    TPro  7.3  /  Alb  2.7  /  TBili  0.8  /  DBili  x   /  AST  20  /  ALT  16  /  AlkPhos  62  12-21          PT/INR - ( 18 Dec 2023 10:15 )   PT: 19.1 sec;   INR: 1.66 ratio       PTT - ( 18 Dec 2023 10:15 )  PTT:36.4 sec      CARDIAC MARKERS ( 18 Dec 2023 10:15 )  x     / 58.8 ng/L / x     / x     / x              Urinalysis Basic - ( 21 Dec 2023 07:56 )    Color: x / Appearance: x / SG: x / pH: x  Gluc: 119 mg/dL / Ketone: x  / Bili: x / Urobili: x   Blood: x / Protein: x / Nitrite: x   Leuk Esterase: x / RBC: x / WBC x   Sq Epi: x / Non Sq Epi: x / Bacteria: x        Culture - Fungal, Body Fluid (collected 19 Dec 2023 13:46)  Source: Pleural Fl Pleural Fluid  Preliminary Report (20 Dec 2023 07:19):    Testing in progress    Culture - Body Fluid with Gram Stain (collected 19 Dec 2023 13:46)  Source: Pleural Fl Pleural Fluid  Gram Stain (20 Dec 2023 02:07):    polymorphonuclear leukocytes seen    No organisms seen    by cytocentrifuge  Preliminary Report (20 Dec 2023 19:27):    No growth    Culture - Fungal, Body Fluid (collected 12-19-23 @ 13:46)  Source: Pleural Fl Pleural Fluid  Preliminary Report (12-20-23 @ 07:19):    Testing in progress    Culture - Body Fluid with Gram Stain (collected 12-19-23 @ 13:46)  Source: Pleural Fl Pleural Fluid  Gram Stain (12-20-23 @ 02:07):    polymorphonuclear leukocytes seen    No organisms seen    by cytocentrifuge  Preliminary Report (12-20-23 @ 19:27):    No growth        RADIOLOGY & ADDITIONAL TESTS: Personally reviewed.     Consultant(s) Notes Reviewed:  [x] YES  [ ] NO   Discussed with CODY/MARICARMEN, RN

## 2023-12-21 NOTE — PROGRESS NOTE ADULT - SUBJECTIVE AND OBJECTIVE BOX
RAD SINGLETON  [id number]      Chief Complaint: Acute on chronic diastolic heart failure exacerbation     Interval History: The patient reports leg swelling is improving. On room air    Tele: atrial fibrillation 70s BPM      Current meds:   acetaminophen     Tablet .. 650 milliGRAM(s) Oral every 6 hours PRN  albuterol    90 MICROgram(s) HFA Inhaler 2 Puff(s) Inhalation every 6 hours PRN  allopurinol 100 milliGRAM(s) Oral daily  aspirin enteric coated 81 milliGRAM(s) Oral daily  atorvastatin 40 milliGRAM(s) Oral at bedtime  bacitracin   Ointment 1 Application(s) Topical daily  budesonide 160 MICROgram(s)/formoterol 4.5 MICROgram(s) Inhaler 2 Puff(s) Inhalation two times a day  furosemide   Injectable 40 milliGRAM(s) IV Push two times a day  hydrALAZINE 25 milliGRAM(s) Oral every 8 hours  melatonin 3 milliGRAM(s) Oral at bedtime  metoprolol succinate ER 50 milliGRAM(s) Oral daily  pantoprazole    Tablet 40 milliGRAM(s) Oral before breakfast  polyethylene glycol 3350 17 Gram(s) Oral two times a day  senna 2 Tablet(s) Oral at bedtime  tiotropium 2.5 MICROgram(s) Inhaler 2 Puff(s) Inhalation daily      Objective:     Vital Signs:   T(C): 36.5 (12-20-23 @ 04:58), Max: 36.6 (12-19-23 @ 20:49)  HR: 87 (12-20-23 @ 08:17) (69 - 87)  BP: 110/51 (12-20-23 @ 04:58) (106/63 - 117/75)  RR: 17 (12-20-23 @ 04:58) (17 - 17)  SpO2: 98% (12-20-23 @ 08:17) (96% - 99%)  Wt(kg): --    Physical Exam:   General: no acute distress  Neck: supple   CVS: JVP ~ 9 cm H20, irregularly irregular, s1, s2  Pulm: unlabored respirations, decreased breath sounds   Ext: 2+ b/l lower extremity edema with ulceration   Neuro: awake, alert and oriented     Labs:   20 Dec 2023 05:57    144    |  103    |  83     ----------------------------<  150    4.0     |  34     |  2.09     Ca    8.6        20 Dec 2023 05:57  Phos  4.3       19 Dec 2023 06:09  Mg     2.4       19 Dec 2023 06:09                            9.6    8.12  )-----------( 154      ( 20 Dec 2023 05:57 )             31.1         TTE (10/2023):   1. Limited echocardiogram performed.   2. Low normal global left ventricular systolic function.   3. Left ventricular ejection fraction, by visual estimation, is 50%.   4. Normal right ventricular size and function.   5. Left atrial enlargement.   6. Right atrial enlargement.   7. Moderate thickening and calcification of the anterior and posterior mitral valve leaflets.   8. Moderately decreased mitral leaflet mobility.   9. Moderate mitral valve stenosis (mean gradient    7 mmHg at HR 77 BPM, MVA 1.1 cm^2).  10. Mild tricuspid regurgitation.  11. Large pleural effusion in the left lateral region.  12. Small pericardial effusion.    TTE (12/19/23):   1. The heart rhythm is irregular throughout the study.   2. Normal global left ventricular systolic function.   3. Left ventricular ejection fraction, by visual estimation, is 50 to 55%.   4. Mildly increased LV wall thickness.   5. Normal left ventricular internal cavity size.   6. The right ventricle is not well visualized, appears generally normal   in size.   7. Left atrial enlargement.   8. Right atrial enlargement.   9. Mild mitral annular calcification.  10. Moderate thickening and calcification of the anterior and posterior mitral valve leaflets.  11. Moderate mitral valve stenosis (mean gradient 8 mmHg at HR 69 BPM, MVA 1.3 cm^2 by  msec).  12. Mild mitral valve regurgitation.  13. Mild-moderate tricuspid regurgitation.  14. Aortic valve leaflet calcification. No aortic valve stenosis.  15. Sclerotic aortic valve with normal opening.  16. Mild aortic regurgitation.  17. Estimated pulmonary artery systolic pressure is 38.0 mmHg assuming a right atrial pressure of 15 mmHg, which is consistent with borderline pulmonary hypertension.  18. Moderate pleural effusion in both left and right lateral regions.  19. There is no evidence of pericardial effusion.      ECG (12/18/23): atrial fibrillation, nonspecific ST abnormalities      RAD SINGLETON  [id number]      Chief Complaint: Acute on chronic diastolic heart failure exacerbation     Interval History: The patient reports leg swelling is improving. On room air    Tele: atrial fibrillation 70s BPM      Current meds:   acetaminophen     Tablet .. 650 milliGRAM(s) Oral every 6 hours PRN  albuterol    90 MICROgram(s) HFA Inhaler 2 Puff(s) Inhalation every 6 hours PRN  allopurinol 100 milliGRAM(s) Oral daily  aspirin enteric coated 81 milliGRAM(s) Oral daily  atorvastatin 40 milliGRAM(s) Oral at bedtime  bacitracin   Ointment 1 Application(s) Topical daily  budesonide 160 MICROgram(s)/formoterol 4.5 MICROgram(s) Inhaler 2 Puff(s) Inhalation two times a day  furosemide   Injectable 40 milliGRAM(s) IV Push two times a day  hydrALAZINE 25 milliGRAM(s) Oral every 8 hours  melatonin 3 milliGRAM(s) Oral at bedtime  metoprolol succinate ER 50 milliGRAM(s) Oral daily  pantoprazole    Tablet 40 milliGRAM(s) Oral before breakfast  polyethylene glycol 3350 17 Gram(s) Oral two times a day  senna 2 Tablet(s) Oral at bedtime  tiotropium 2.5 MICROgram(s) Inhaler 2 Puff(s) Inhalation daily      Objective:     Vital Signs:   T(C): 36.5 (12-20-23 @ 04:58), Max: 36.6 (12-19-23 @ 20:49)  HR: 87 (12-20-23 @ 08:17) (69 - 87)  BP: 110/51 (12-20-23 @ 04:58) (106/63 - 117/75)  RR: 17 (12-20-23 @ 04:58) (17 - 17)  SpO2: 98% (12-20-23 @ 08:17) (96% - 99%)  Wt(kg): --    Physical Exam:   General: no acute distress  Neck: supple   CVS: JVP ~ 9 cm H20, irregularly irregular, s1, s2  Pulm: unlabored respirations, decreased breath sounds   Ext: 2+ b/l lower extremity edema with ulceration   Neuro: awake, alert and oriented     Labs:   20 Dec 2023 05:57    144    |  103    |  83     ----------------------------<  150    4.0     |  34     |  2.09     Ca    8.6        20 Dec 2023 05:57  Phos  4.3       19 Dec 2023 06:09  Mg     2.4       19 Dec 2023 06:09                            9.6    8.12  )-----------( 154      ( 20 Dec 2023 05:57 )             31.1         TTE (10/2023):   1. Limited echocardiogram performed.   2. Low normal global left ventricular systolic function.   3. Left ventricular ejection fraction, by visual estimation, is 50%.   4. Normal right ventricular size and function.   5. Left atrial enlargement.   6. Right atrial enlargement.   7. Moderate thickening and calcification of the anterior and posterior mitral valve leaflets.   8. Moderately decreased mitral leaflet mobility.   9. Moderate mitral valve stenosis (mean gradient    7 mmHg at HR 77 BPM, MVA 1.1 cm^2).  10. Mild tricuspid regurgitation.  11. Large pleural effusion in the left lateral region.  12. Small pericardial effusion.    TTE (12/19/23):   1. The heart rhythm is irregular throughout the study.   2. Normal global left ventricular systolic function.   3. Left ventricular ejection fraction, by visual estimation, is 50 to 55%.   4. Mildly increased LV wall thickness.   5. Normal left ventricular internal cavity size.   6. The right ventricle is not well visualized, appears generally normal   in size.   7. Left atrial enlargement.   8. Right atrial enlargement.   9. Mild mitral annular calcification.  10. Moderate thickening and calcification of the anterior and posterior mitral valve leaflets.  11. Moderate mitral valve stenosis (mean gradient 8 mmHg at HR 69 BPM, MVA 1.3 cm^2 by  msec).  12. Mild mitral valve regurgitation.  13. Mild-moderate tricuspid regurgitation.  14. Aortic valve leaflet calcification. No aortic valve stenosis.  15. Sclerotic aortic valve with normal opening.  16. Mild aortic regurgitation.  17. Estimated pulmonary artery systolic pressure is 38.0 mmHg assuming a right atrial pressure of 15 mmHg, which is consistent with borderline pulmonary hypertension.  18. Moderate pleural effusion in both left and right lateral regions.  19. There is no evidence of pericardial effusion.      ECG (12/18/23): atrial fibrillation, nonspecific ST abnormalities

## 2023-12-21 NOTE — PROGRESS NOTE ADULT - ASSESSMENT
Physical Examination:  GENERAL:               Alert, Oriented, No acute distress.    HEENT:                   No JVD, Moist MM, fresh blood in left nare  PULM:                     Bilateral air entry,  No Rales, No Rhonchi, No Wheezing  CVS:                         S1, S2,  +murmur  ABD:                        Soft, nondistended, nontender, normoactive bowel sounds,   EXT:                         +edema, nontender, No Cyanosis or Clubbing   Vascular:                Warm Extremities,   SKIN:                       Warm and well perfused, no rashes noted.   NEURO:                  Alert, oriented, interactive, nonfocal, follows commands  PSYC:                      Calm, + Insight.      Assessment  ERV  Dyspnea suspect due to acute on chronic Diastolic CHF  Chronic Left pleural effusions  s/p thoracentesis 11/24/23    Afib on a/c  Cigar smoker, at risk for copd.   Worsening renal function on CKD    Plan  pleural effusion recurrent - appears exudative effusion   unsure if primary cause of SOB, but as fluid was previously exudative with h/o pipe smoking and patient off a/c   Follow up pleural fluid results - awaiting cytopathology  ENT consult for epistaxis   monitor on room air  out of bed as tolerated  optimize cardiac meds as per Cardio  Rest of care as per primary team.  Discussed with Dr. Gilliland

## 2023-12-21 NOTE — PROGRESS NOTE ADULT - SUBJECTIVE AND OBJECTIVE BOX
Indication for Geriatrics and Palliative Care Services/INTERVAL HPI: C    OVERNIGHT EVENTS: no acute overnight events noted  SUBJECTIVE AND OBJECTIVE: Pt seen and examined at bedside this morning. Pt had epistaxis but denied dizziness/SOB/CP/abd pain. Went back to pt room in afternoon and he just fell asleep. Salena was at bedside, see [facility] note below.    Allergies  No Known Allergies    Intolerances    MEDICATIONS  (STANDING):  allopurinol 100 milliGRAM(s) Oral daily  apixaban 2.5 milliGRAM(s) Oral every 12 hours  aspirin enteric coated 81 milliGRAM(s) Oral daily  atorvastatin 40 milliGRAM(s) Oral at bedtime  bacitracin   Ointment 1 Application(s) Topical daily  budesonide 160 MICROgram(s)/formoterol 4.5 MICROgram(s) Inhaler 2 Puff(s) Inhalation two times a day  furosemide   Injectable 60 milliGRAM(s) IV Push two times a day  hydrALAZINE 25 milliGRAM(s) Oral every 8 hours  melatonin 3 milliGRAM(s) Oral at bedtime  metoprolol succinate ER 50 milliGRAM(s) Oral daily  pantoprazole    Tablet 40 milliGRAM(s) Oral before breakfast  polyethylene glycol 3350 17 Gram(s) Oral two times a day  senna 2 Tablet(s) Oral at bedtime  tiotropium 2.5 MICROgram(s) Inhaler 2 Puff(s) Inhalation daily    MEDICATIONS  (PRN):  acetaminophen     Tablet .. 650 milliGRAM(s) Oral every 6 hours PRN Temp greater or equal to 38C (100.4F), Mild Pain (1 - 3)  albuterol    90 MICROgram(s) HFA Inhaler 2 Puff(s) Inhalation every 6 hours PRN Shortness of Breath and/or Wheezing      ITEMS UNCHECKED ARE NOT PRESENT    PRESENT SYMPTOMS:      Pain:   no  QOL impact -   Location -                    Aggravating factors -  Quality -  Radiation -  Timing-  Severity (0-10 scale):  Minimal acceptable level (0-10 scale):     Dyspnea:                          Mild    Anxiety:                             does not report  Depressed:                     does not report  Fatigue:                             mild  Nausea:                            none  Loss of appetite:             none  Constipation:                    none      Other Symptoms:  [X ]All other review of systems negative       Chaplaincy Referral:   Deferred   Palliative Performance Status Version 2:     50    %     http://npcrc.org/files/news/palliative_performance_scale_ppsv2.pdf    PHYSICAL EXAM:  Vital Signs Last 24 Hrs  T(C): 36.4 (21 Dec 2023 12:26), Max: 36.7 (20 Dec 2023 20:34)  T(F): 97.5 (21 Dec 2023 12:26), Max: 98 (20 Dec 2023 20:34)  HR: 98 (21 Dec 2023 13:21) (77 - 98)  BP: 114/60 (21 Dec 2023 13:21) (100/63 - 156/68)  BP(mean): --  RR: 17 (21 Dec 2023 12:26) (16 - 17)  SpO2: 98% (21 Dec 2023 12:26) (94% - 98%)    Parameters below as of 21 Dec 2023 12:26  Patient On (Oxygen Delivery Method): room air     I&O's Summary    20 Dec 2023 07:01  -  21 Dec 2023 07:00  --------------------------------------------------------  IN: 120 mL / OUT: 200 mL / NET: -80 mL    21 Dec 2023 07:01  -  21 Dec 2023 14:06  --------------------------------------------------------  IN: 120 mL / OUT: 0 mL / NET: 120 mL       GENERAL: disheveled elderly male sitting in chair in NAD  HEENT: NC/AT, MMM  PULMONARY: nonlabored breathing, no resp distress  CARDIOVASCULAR:  irregularly irregular rhythm  GASTROINTESTINAL: soft, nontender  Last BM: 12/20  MUSCULOSKELETAL: +weakness, moves all extremities; significant edema to BLE - wrapped in ace bandages  NEUROLOGIC: A&Ox3, disgruntled man  SKIN: see nursing assessments for details; bullous formation to RLE, significant discoloration noted      LABS:                        9.4    8.41  )-----------( 146      ( 21 Dec 2023 07:56 )             29.3   12-21    144  |  103  |  93<H>  ----------------------------<  119<H>  4.5   |  30  |  2.03<H>    Ca    8.8      21 Dec 2023 07:56    TPro  7.3  /  Alb  2.7<L>  /  TBili  0.8  /  DBili  x   /  AST  20  /  ALT  16  /  AlkPhos  62  12-21      Urinalysis Basic - ( 21 Dec 2023 07:56 )    Color: x / Appearance: x / SG: x / pH: x  Gluc: 119 mg/dL / Ketone: x  / Bili: x / Urobili: x   Blood: x / Protein: x / Nitrite: x   Leuk Esterase: x / RBC: x / WBC x   Sq Epi: x / Non Sq Epi: x / Bacteria: x      RADIOLOGY & ADDITIONAL STUDIES:    PROCEDURE DATE:  12/20/2023          INTERPRETATION:  CLINICAL INFORMATION: Left pleural effusion.    COMPARISON: 11/22/2023.    CONTRAST/COMPLICATIONS:  IV Contrast: NONE  Oral Contrast: NONE  Complications: None reported at time of study completion    PROCEDURE:  CT of the Chest was performed.  Sagittal and coronal reformats were performed.    FINDINGS:    LUNGS AND AIRWAYS: Patent central airways.  Band type opacity is   identified within the right lower lobe, likely related to atelectasis.   Opacity within the lingular segment of the left upper lobe and left lower   lobe is consistent with atelectasis; however, underlying parenchymal   infiltrate/consolidation cannot be excluded.  PLEURA: No right pleural effusion. Moderate left pleural effusion. No   pneumothorax.  MEDIASTINUM AND YANIQUE: Mediastinal lymph nodes identified measuring up to   2.6 x 2.0 cm. No definite hilar lymphadenopathy.  VESSELS: Coronary arterial calcifications. Heavy atherosclerotic   calcification noted. Thoracic aortic caliber appears unremarkable.  HEART: Cardiomegaly. No pericardial effusion.  CHEST WALL AND LOWER NECK: Bilateral retroareolar soft tissue   attenuation, likely gynecomastia.  VISUALIZED UPPER ABDOMEN: Gallstones. Visualized adrenal glands appear   unremarkable. Bilateral renal cysts.  BONES: Chronic appearing deformity bilateral thoracic cage. Spinal   degenerative changes.    IMPRESSION: Moderate left pleural effusion. Band type opacity is   identified within the right lower lobe, likely related to atelectasis.   Opacity within the lingular segment of the left upper lobe and left lower   lobe is consistent with atelectasis; however, underlying parenchymal   infiltrate/consolidation cannot be excluded. Mediastinal lymph nodes   identified measuring up to 2.6 x 2.0 cm.            --- End of Report ---        Protein Calorie Malnutrition Present: [ ]mild [ ]moderate [ ]severe [ ]underweight [ ]morbid obesity  https://www.andeal.org/vault/4540/web/files/ONC/Table_Clinical%20Characteristics%20to%20Document%20Malnutrition-White%20JV%20et%20al%202012.pdf    Height (cm): 167.6 (12-18-23 @ 09:52), 167.6 (11-22-23 @ 17:35), 167.6 (10-15-23 @ 21:52)  Weight (kg): 70.3 (12-18-23 @ 09:52), 75.75 (11-25-23 @ 04:49), 72.6 (10-15-23 @ 21:52)  BMI (kg/m2): 25 (12-18-23 @ 09:52), 27 (11-25-23 @ 04:49), 25.8 (11-22-23 @ 17:35)    [ ]PPSV2 < or = 30%  [ ]significant weight loss [ ]poor nutritional intake [ ]anasarca[ ]Artificial Nutrition    Other REFERRALS:  [ ]Hospice  [ ]Child Life  [ ]Social Work  [ ]Case management [ ]Holistic Therapy  Indication for Geriatrics and Palliative Care Services/INTERVAL HPI: C    OVERNIGHT EVENTS: no acute overnight events noted  SUBJECTIVE AND OBJECTIVE: Pt seen and examined at bedside this morning. Pt had epistaxis but denied dizziness/SOB/CP/abd pain. Went back to pt room in afternoon and he just fell asleep. Salena was at bedside, see [facility] note below.    Allergies  No Known Allergies    Intolerances    MEDICATIONS  (STANDING):  allopurinol 100 milliGRAM(s) Oral daily  apixaban 2.5 milliGRAM(s) Oral every 12 hours  aspirin enteric coated 81 milliGRAM(s) Oral daily  atorvastatin 40 milliGRAM(s) Oral at bedtime  bacitracin   Ointment 1 Application(s) Topical daily  budesonide 160 MICROgram(s)/formoterol 4.5 MICROgram(s) Inhaler 2 Puff(s) Inhalation two times a day  furosemide   Injectable 60 milliGRAM(s) IV Push two times a day  hydrALAZINE 25 milliGRAM(s) Oral every 8 hours  melatonin 3 milliGRAM(s) Oral at bedtime  metoprolol succinate ER 50 milliGRAM(s) Oral daily  pantoprazole    Tablet 40 milliGRAM(s) Oral before breakfast  polyethylene glycol 3350 17 Gram(s) Oral two times a day  senna 2 Tablet(s) Oral at bedtime  tiotropium 2.5 MICROgram(s) Inhaler 2 Puff(s) Inhalation daily    MEDICATIONS  (PRN):  acetaminophen     Tablet .. 650 milliGRAM(s) Oral every 6 hours PRN Temp greater or equal to 38C (100.4F), Mild Pain (1 - 3)  albuterol    90 MICROgram(s) HFA Inhaler 2 Puff(s) Inhalation every 6 hours PRN Shortness of Breath and/or Wheezing      ITEMS UNCHECKED ARE NOT PRESENT    PRESENT SYMPTOMS:      Pain:   no  QOL impact -   Location -                    Aggravating factors -  Quality -  Radiation -  Timing-  Severity (0-10 scale):  Minimal acceptable level (0-10 scale):     Dyspnea:                          Mild    Anxiety:                             does not report  Depressed:                     does not report  Fatigue:                             mild  Nausea:                            none  Loss of appetite:             none  Constipation:                    none      Other Symptoms:  [X ]All other review of systems negative       Chaplaincy Referral:   Deferred   Palliative Performance Status Version 2:     50    %     http://npcrc.org/files/news/palliative_performance_scale_ppsv2.pdf    PHYSICAL EXAM:  Vital Signs Last 24 Hrs  T(C): 36.4 (21 Dec 2023 12:26), Max: 36.7 (20 Dec 2023 20:34)  T(F): 97.5 (21 Dec 2023 12:26), Max: 98 (20 Dec 2023 20:34)  HR: 98 (21 Dec 2023 13:21) (77 - 98)  BP: 114/60 (21 Dec 2023 13:21) (100/63 - 156/68)  BP(mean): --  RR: 17 (21 Dec 2023 12:26) (16 - 17)  SpO2: 98% (21 Dec 2023 12:26) (94% - 98%)    Parameters below as of 21 Dec 2023 12:26  Patient On (Oxygen Delivery Method): room air     I&O's Summary    20 Dec 2023 07:01  -  21 Dec 2023 07:00  --------------------------------------------------------  IN: 120 mL / OUT: 200 mL / NET: -80 mL    21 Dec 2023 07:01  -  21 Dec 2023 14:06  --------------------------------------------------------  IN: 120 mL / OUT: 0 mL / NET: 120 mL       GENERAL: disheveled elderly male sitting in chair in NAD  HEENT: NC/AT, MMM  PULMONARY: nonlabored breathing, no resp distress  CARDIOVASCULAR:  irregularly irregular rhythm  GASTROINTESTINAL: soft, nontender  Last BM: 12/20  MUSCULOSKELETAL: +weakness, moves all extremities; significant edema to BLE - wrapped in ace bandages  NEUROLOGIC: A&Ox3, disgruntled man  SKIN: see nursing assessments for details; bullous formation to RLE, significant discoloration noted      LABS:                        9.4    8.41  )-----------( 146      ( 21 Dec 2023 07:56 )             29.3   12-21    144  |  103  |  93<H>  ----------------------------<  119<H>  4.5   |  30  |  2.03<H>    Ca    8.8      21 Dec 2023 07:56    TPro  7.3  /  Alb  2.7<L>  /  TBili  0.8  /  DBili  x   /  AST  20  /  ALT  16  /  AlkPhos  62  12-21      Urinalysis Basic - ( 21 Dec 2023 07:56 )    Color: x / Appearance: x / SG: x / pH: x  Gluc: 119 mg/dL / Ketone: x  / Bili: x / Urobili: x   Blood: x / Protein: x / Nitrite: x   Leuk Esterase: x / RBC: x / WBC x   Sq Epi: x / Non Sq Epi: x / Bacteria: x      RADIOLOGY & ADDITIONAL STUDIES:    PROCEDURE DATE:  12/20/2023          INTERPRETATION:  CLINICAL INFORMATION: Left pleural effusion.    COMPARISON: 11/22/2023.    CONTRAST/COMPLICATIONS:  IV Contrast: NONE  Oral Contrast: NONE  Complications: None reported at time of study completion    PROCEDURE:  CT of the Chest was performed.  Sagittal and coronal reformats were performed.    FINDINGS:    LUNGS AND AIRWAYS: Patent central airways.  Band type opacity is   identified within the right lower lobe, likely related to atelectasis.   Opacity within the lingular segment of the left upper lobe and left lower   lobe is consistent with atelectasis; however, underlying parenchymal   infiltrate/consolidation cannot be excluded.  PLEURA: No right pleural effusion. Moderate left pleural effusion. No   pneumothorax.  MEDIASTINUM AND YANIQUE: Mediastinal lymph nodes identified measuring up to   2.6 x 2.0 cm. No definite hilar lymphadenopathy.  VESSELS: Coronary arterial calcifications. Heavy atherosclerotic   calcification noted. Thoracic aortic caliber appears unremarkable.  HEART: Cardiomegaly. No pericardial effusion.  CHEST WALL AND LOWER NECK: Bilateral retroareolar soft tissue   attenuation, likely gynecomastia.  VISUALIZED UPPER ABDOMEN: Gallstones. Visualized adrenal glands appear   unremarkable. Bilateral renal cysts.  BONES: Chronic appearing deformity bilateral thoracic cage. Spinal   degenerative changes.    IMPRESSION: Moderate left pleural effusion. Band type opacity is   identified within the right lower lobe, likely related to atelectasis.   Opacity within the lingular segment of the left upper lobe and left lower   lobe is consistent with atelectasis; however, underlying parenchymal   infiltrate/consolidation cannot be excluded. Mediastinal lymph nodes   identified measuring up to 2.6 x 2.0 cm.            --- End of Report ---        Protein Calorie Malnutrition Present: [ ]mild [ ]moderate [ ]severe [ ]underweight [ ]morbid obesity  https://www.andeal.org/vault/2130/web/files/ONC/Table_Clinical%20Characteristics%20to%20Document%20Malnutrition-White%20JV%20et%20al%202012.pdf    Height (cm): 167.6 (12-18-23 @ 09:52), 167.6 (11-22-23 @ 17:35), 167.6 (10-15-23 @ 21:52)  Weight (kg): 70.3 (12-18-23 @ 09:52), 75.75 (11-25-23 @ 04:49), 72.6 (10-15-23 @ 21:52)  BMI (kg/m2): 25 (12-18-23 @ 09:52), 27 (11-25-23 @ 04:49), 25.8 (11-22-23 @ 17:35)    [ ]PPSV2 < or = 30%  [ ]significant weight loss [ ]poor nutritional intake [ ]anasarca[ ]Artificial Nutrition    Other REFERRALS:  [ ]Hospice  [ ]Child Life  [ ]Social Work  [ ]Case management [ ]Holistic Therapy

## 2023-12-22 NOTE — DISCHARGE NOTE PROVIDER - DETAILS OF MALNUTRITION DIAGNOSIS/DIAGNOSES
This patient has been assessed with a concern for Malnutrition and was treated during this hospitalization for the following Nutrition diagnosis/diagnoses:     -  12/19/2023: Severe protein-calorie malnutrition

## 2023-12-22 NOTE — PROGRESS NOTE ADULT - SUBJECTIVE AND OBJECTIVE BOX
Follow-up Pulmonary Progress Note  Chief Complaint : Urticaria          No new events overnight.  Denies SOB/CP.       Allergies :No Known Allergies      PAST MEDICAL & SURGICAL HISTORY:  Afib    Congestive heart failure (CHF)    CVA (cerebral vascular accident)    Hypertension, unspecified type    Chronic obstructive pulmonary disease (COPD)    No significant past surgical history        Medications:  MEDICATIONS  (STANDING):  allopurinol 100 milliGRAM(s) Oral daily  apixaban 2.5 milliGRAM(s) Oral every 12 hours  aspirin enteric coated 81 milliGRAM(s) Oral daily  atorvastatin 40 milliGRAM(s) Oral at bedtime  bacitracin   Ointment 1 Application(s) Topical daily  budesonide 160 MICROgram(s)/formoterol 4.5 MICROgram(s) Inhaler 2 Puff(s) Inhalation two times a day  furosemide   Injectable 60 milliGRAM(s) IV Push once  hydrALAZINE 25 milliGRAM(s) Oral every 8 hours  melatonin 3 milliGRAM(s) Oral at bedtime  metoprolol succinate ER 50 milliGRAM(s) Oral daily  pantoprazole    Tablet 40 milliGRAM(s) Oral before breakfast  polyethylene glycol 3350 17 Gram(s) Oral two times a day  senna 2 Tablet(s) Oral at bedtime  tiotropium 2.5 MICROgram(s) Inhaler 2 Puff(s) Inhalation daily    MEDICATIONS  (PRN):  acetaminophen     Tablet .. 650 milliGRAM(s) Oral every 6 hours PRN Temp greater or equal to 38C (100.4F), Mild Pain (1 - 3)  albuterol    90 MICROgram(s) HFA Inhaler 2 Puff(s) Inhalation every 6 hours PRN Shortness of Breath and/or Wheezing      Antibiotics History      Heme Medications   apixaban 2.5 milliGRAM(s) Oral every 12 hours, 12-20-23 @ 14:46  aspirin enteric coated 81 milliGRAM(s) Oral daily, 12-18-23 @ 14:40      GI Medications  pantoprazole    Tablet 40 milliGRAM(s) Oral before breakfast, 12-18-23 @ 14:42, Routine  polyethylene glycol 3350 17 Gram(s) Oral two times a day, 12-19-23 @ 08:53,   senna 2 Tablet(s) Oral at bedtime, 12-18-23 @ 14:17, Routine        LABS:                        9.4    9.05  )-----------( 135      ( 22 Dec 2023 07:24 )             29.0     12-22    143  |  102  |  105<H>  ----------------------------<  119<H>  4.5   |  33<H>  |  2.05<H>    Ca    9.1      22 Dec 2023 07:24    TPro  7.5  /  Alb  2.7<L>  /  TBili  0.7  /  DBili  x   /  AST  18  /  ALT  15  /  AlkPhos  62  12-22    Fluid characteristics  -- 12-19 @ 13:46  pH 7.5    tprot 5.2    Cell count  Appearance Bloody  Fluid type --  BF lymph 33  color Red  eosinophil 3  PMN --  Mesothelial 1  Monocyte 35  Other body cells 0            Urinalysis Basic - ( 22 Dec 2023 07:24 )    Color: x / Appearance: x / SG: x / pH: x  Gluc: 119 mg/dL / Ketone: x  / Bili: x / Urobili: x   Blood: x / Protein: x / Nitrite: x   Leuk Esterase: x / RBC: x / WBC x   Sq Epi: x / Non Sq Epi: x / Bacteria: x              CULTURES: (if applicable)    Culture - Fungal, Body Fluid (collected 12-19-23 @ 13:46)  Source: Pleural Fl Pleural Fluid  Preliminary Report (12-20-23 @ 07:19):    Testing in progress    Culture - Body Fluid with Gram Stain (collected 12-19-23 @ 13:46)  Source: Pleural Fl Pleural Fluid  Gram Stain (12-20-23 @ 02:07):    polymorphonuclear leukocytes seen    No organisms seen    by cytocentrifuge  Preliminary Report (12-20-23 @ 19:27):    No growth      Rapid RVP Result: Detected (12-18-23 @ 10:15)        CAPILLARY BLOOD GLUCOSE          RADIOLOGY  CXR:      CT:    ECHO:      VITALS:  T(C): 36.6 (12-22-23 @ 05:09), Max: 36.6 (12-22-23 @ 05:09)  T(F): 97.8 (12-22-23 @ 05:09), Max: 97.8 (12-22-23 @ 05:09)  HR: 88 (12-22-23 @ 09:03) (68 - 98)  BP: 119/66 (12-22-23 @ 05:09) (100/63 - 119/66)  BP(mean): --  ABP: --  ABP(mean): --  RR: 17 (12-22-23 @ 05:09) (16 - 17)  SpO2: 97% (12-22-23 @ 09:03) (92% - 98%)  CVP(mm Hg): --  CVP(cm H2O): --    Ins and Outs     12-21-23 @ 07:01  -  12-22-23 @ 07:00  --------------------------------------------------------  IN: 240 mL / OUT: 0 mL / NET: 240 mL                I&O's Detail    21 Dec 2023 07:01  -  22 Dec 2023 07:00  --------------------------------------------------------  IN:    Oral Fluid: 240 mL  Total IN: 240 mL    OUT:  Total OUT: 0 mL    Total NET: 240 mL

## 2023-12-22 NOTE — DISCHARGE NOTE PROVIDER - NSDCCAREPROVSEEN_GEN_ALL_CORE_FT
Tez Kaufman, Ntao Sr, Pavan Sandoval Ma. Dexter Ayon, Juan Reese, Georgia Grey, Roseanna Hernandez, Chapo Gilliland, Peter Gill, Morena Buckley, Manuel Earl, Abel Rockwell Tez Kaufman, Nato Sr, Pavan Sandoval Ma. Dexter Ayon, Juan Reese, Georgia Grye, Roseanna Hernandez, Chapo Gilliland, Peter Gill, Morena Buckley, Manuel Earl, Abel Rockwell

## 2023-12-22 NOTE — PROGRESS NOTE ADULT - ASSESSMENT
Physical Examination:  GENERAL:               Alert, Oriented, No acute distress.    HEENT:                   No JVD, Moist MM, fresh blood in left nare  PULM:                     Bilateral air entry,  No Rales, No Rhonchi, No Wheezing  CVS:                         S1, S2,  +murmur  ABD:                        Soft, nondistended, nontender, normoactive bowel sounds,   EXT:                         +edema, nontender, No Cyanosis or Clubbing   Vascular:                Warm Extremities,   SKIN:                       Warm and well perfused, no rashes noted.   NEURO:                  Alert, oriented, interactive, nonfocal, follows commands  PSYC:                      Calm, + Insight.      Assessment  ERV  Dyspnea suspect due to acute on chronic Diastolic CHF  Chronic Left pleural effusions  s/p thoracentesis 11/24/23    Afib on a/c  Cigar smoker, at risk for copd.   Worsening renal function on CKD    Plan  pleural effusion recurrent - appears exudative effusion   CXR stable   unsure if primary cause of SOB, but as fluid was previously exudative with h/o pipe smoking and patient off a/c   Follow up pleural fluid results - awaiting cytopathology  ENT consult for epistaxis - no further epistaxis   monitor on room air  out of bed as tolerated  optimize cardiac meds as per Cardio  Rest of care as per primary team.  Can follow up outpatient for pleural fluid results  Discussed with Dr. Gilliland

## 2023-12-22 NOTE — DISCHARGE NOTE PROVIDER - PROVIDER TOKENS
PROVIDER:[TOKEN:[[id number]]] PROVIDER:[TOKEN:[[id number]]] FREE:[LAST:[Casa],FIRST:[Parveen],PHONE:[phone]:[phone]],ADDRESS:[ Clinical Practice  [address] [place] [address],  [place] [address]]] FREE:[LAST:[Casa],FIRST:[Parveen],PHONE:[phone]:[phone]],ADDRESS:[ Clinical Practice  [address] [place] [address],  [place] [address]]] FREE:[LAST:[Cormier],FIRST:[Parveen],PHONE:[phone]],FAX:[phone]],ADDRESS:[ Clinical Practice  [address] [place] [address],  [place] [address]],FOLLOWUP:[1 week],ESTABLISHEDPATIENT:[T]] FREE:[LAST:[Cormier],FIRST:[Parveen],PHONE:[phone]:[phone]],ADDRESS:[ Clinical Practice  [address] [place] [address],  [place] [address]],FOLLOWUP:[1 week],ESTABLISHEDPATIENT:[T]]

## 2023-12-22 NOTE — DISCHARGE NOTE PROVIDER - NSDCMRMEDTOKEN_GEN_ALL_CORE_FT
albuterol 90 mcg/inh inhalation aerosol: 2 puff(s) inhaled every 6 hours  allopurinol 100 mg oral tablet: 1 tab(s) orally once a day  apixaban 2.5 mg oral tablet: 1 tab(s) orally every 12 hours  aspirin 81 mg oral delayed release tablet: 1 tab(s) orally once a day  atorvastatin 40 mg oral tablet: 1 tab(s) orally once a day (at bedtime)  budesonide-formoterol 160 mcg-4.5 mcg/inh inhalation aerosol: 2 puff(s) inhaled 2 times a day  Farxiga 10 mg oral tablet: 1 tab(s) orally once a day  hydrALAZINE 25 mg oral tablet: 1 tab(s) orally every 8 hours  metoprolol succinate 50 mg oral tablet, extended release: 1 tab(s) orally once a day  Multiple Vitamins oral tablet: 1 tab(s) orally once a day  pantoprazole 40 mg oral delayed release tablet: 1 tab(s) orally once a day (before a meal)  tiotropium 2.5 mcg/inh inhalation aerosol: 2 puff(s) inhaled once a day  torsemide 100 mg oral tablet: 1 tab(s) orally once a day   acetaminophen 325 mg oral tablet: 2 tab(s) orally every 6 hours As needed Temp greater or equal to 38C (100.4F), Mild Pain (1 - 3)  albuterol 90 mcg/inh inhalation aerosol: 2 puff(s) inhaled every 6 hours As needed Shortness of Breath and/or Wheezing  allopurinol 100 mg oral tablet: 1 tab(s) orally once a day  apixaban 2.5 mg oral tablet: 1 tab(s) orally every 12 hours  aspirin 81 mg oral delayed release tablet: 1 tab(s) orally once a day  atorvastatin 40 mg oral tablet: 1 tab(s) orally once a day (at bedtime)  bacitracin 500 units/g topical ointment: 1 Apply topically to affected area once a day  budesonide-formoterol 160 mcg-4.5 mcg/inh inhalation aerosol: 2 puff(s) inhaled 2 times a day  Farxiga 10 mg oral tablet: 1 tab(s) orally once a day  melatonin 3 mg oral tablet: 1 tab(s) orally once a day (at bedtime)  metoprolol succinate 50 mg oral tablet, extended release: 1 tab(s) orally once a day  pantoprazole 40 mg oral delayed release tablet: 1 tab(s) orally once a day (before a meal)  polyethylene glycol 3350 oral powder for reconstitution: 17 gram(s) orally 2 times a day  senna leaf extract oral tablet: 2 tab(s) orally once a day (at bedtime)  tiotropium 2.5 mcg/inh inhalation aerosol: 2 puff(s) inhaled once a day   acetaminophen 325 mg oral tablet: 2 tab(s) orally every 6 hours As needed Temp greater or equal to 38C (100.4F), Mild Pain (1 - 3)  albuterol 90 mcg/inh inhalation aerosol: 2 puff(s) inhaled every 6 hours As needed Shortness of Breath and/or Wheezing  allopurinol 100 mg oral tablet: 1 tab(s) orally once a day  apixaban 2.5 mg oral tablet: 1 tab(s) orally every 12 hours  aspirin 81 mg oral delayed release tablet: 1 tab(s) orally once a day  atorvastatin 40 mg oral tablet: 1 tab(s) orally once a day (at bedtime)  bacitracin 500 units/g topical ointment: 1 Apply topically to affected area once a day  budesonide-formoterol 160 mcg-4.5 mcg/inh inhalation aerosol: 2 puff(s) inhaled 2 times a day  diphenhydramine-zinc acetate 2%-0.1% topical cream: 1 Apply topically to affected area 2 times a day As needed Itching  Farxiga 10 mg oral tablet: 1 tab(s) orally once a day  furosemide 100 mg/100 mL-0.9% intravenous solution: 40 milliliter(s) intravenous once  melatonin 3 mg oral tablet: 1 tab(s) orally once a day (at bedtime)  metoprolol succinate 50 mg oral tablet, extended release: 1 tab(s) orally once a day  pantoprazole 40 mg oral delayed release tablet: 1 tab(s) orally once a day (before a meal)  polyethylene glycol 3350 oral powder for reconstitution: 17 gram(s) orally 2 times a day  senna leaf extract oral tablet: 2 tab(s) orally once a day (at bedtime)  tiotropium 2.5 mcg/inh inhalation aerosol: 2 puff(s) inhaled once a day

## 2023-12-22 NOTE — PROGRESS NOTE ADULT - NUTRITIONAL ASSESSMENT
This patient has been assessed with a concern for Malnutrition and has been determined to have a diagnosis/diagnoses of Severe protein-calorie malnutrition.    This patient is being managed with:   Diet Regular-  1000mL Fluid Restriction ([id number])  Low Sodium  Piero(7 Gm Arginine/7 Gm Glut/1.2 Gm HMB     Qty per Day:  2  Supplement Feeding Modality:  Oral  Ensure Plus High Protein Cans or Servings Per Day:  1       Frequency:  Daily  Entered: Dec 19 2023 11:11AM   This patient has been assessed with a concern for Malnutrition and has been determined to have a diagnosis/diagnoses of Severe protein-calorie malnutrition.    This patient is being managed with:   Diet Regular-  1000mL Fluid Restriction ([id number])  Low Sodium  Piero(7 Gm Arginine/7 Gm Glut/1.2 Gm HMB     Qty per Day:  2  Supplement Feeding Modality:  Oral  Ensure Plus High Protein Cans or Servings Per Day:  1       Frequency:  Daily  Entered: Dec 19 2023 11:11AM

## 2023-12-22 NOTE — DISCHARGE NOTE PROVIDER - CARE PROVIDER_API CALL
Parveen Cormier  Phone: [phone]  Fax: [phone]  Follow Up Time:    Parveen Cormier   Clinical Practice  [address] [facility],  [place] [address]  Phone: [phone]  Fax: [phone]  Follow Up Time:    Parveen Cormier   Clinical Practice  [address] [facility],  [place] [address]  Phone: [phone]  Fax: [phone]  Follow Up Time:    Parveen Cormier   Clinical Practice  [address] [facility],  [place] [address]  Phone: [phone]  Fax: [phone]  Established Patient  Follow Up Time: 1 week   Parveen Cormier   Clinical Practice  [address] [facility],  [place] [address]  Phone: [phone]  Fax: [phone]  Established Patient  Follow Up Time: 1 week

## 2023-12-22 NOTE — PROGRESS NOTE ADULT - ASSESSMENT
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 Need to obtain collateral from family, patient is poor historian  -Home regimen is torsemide 100 daily per PCP Dr. Rubio  -increased IV lasix 60 BID  -strict I/os, daily weights, fluid restriction and low salt diet  -monitor on tele  -cardio recs appreciated    #Pleural effusion, left   -worsening left sided effusion  -s/p thoracentesis with 1 L removal during last hospitalization  -cytopath negative for malignancy. Suspect 2/2 HF exacerbation  -recommend aggressive diuresis, if respiratory distress - may need therapeutic thoracentesis.  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    #COPD   -not in exacerbation  -given COPD, sew740% on greater acceptable. 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 Need to obtain collateral from family, patient is poor historian  -Home regimen is torsemide 100 daily per PCP Dr. Rubio  -increased IV lasix 60 BID  -strict I/os, daily weights, fluid restriction and low salt diet  -monitor on tele  -cardio recs appreciated    #Pleural effusion, left   -worsening left sided effusion  -s/p thoracentesis with 1 L removal during last hospitalization  -cytopath negative for malignancy. Suspect 2/2 HF exacerbation  -recommend aggressive diuresis, if respiratory distress - may need therapeutic thoracentesis.  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    #COPD   -not in exacerbation  -given COPD, [id number]% on greater acceptable. 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 Need to obtain collateral from family, patient is poor historian  -Home regimen is torsemide 100 daily per PCP Dr. Rubio  -c/w IV lasix 60 BID  -strict I/os, daily weights, fluid restriction and low salt diet  -monitor on tele  -cardio recs appreciated: If renal function stabilizes then may consider SGLT2-I this admission.    #Pleural effusion, left   -worsening left sided effusion  -s/p thoracentesis with 1 L removal during last hospitalization  -cytopath negative for malignancy. Suspect 2/2 HF exacerbation  -recommend aggressive diuresis, if respiratory distress - may need therapeutic thoracentesis.  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    #COPD   -not in exacerbation  -given COPD, eep835% on greater acceptable. 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 Need to obtain collateral from family, patient is poor historian  -Home regimen is torsemide 100 daily per PCP Dr. Rubio  -c/w IV lasix 60 BID  -strict I/os, daily weights, fluid restriction and low salt diet  -monitor on tele  -cardio recs appreciated: If renal function stabilizes then may consider SGLT2-I this admission.    #Pleural effusion, left   -worsening left sided effusion  -s/p thoracentesis with 1 L removal during last hospitalization  -cytopath negative for malignancy. Suspect 2/2 HF exacerbation  -recommend aggressive diuresis, if respiratory distress - may need therapeutic thoracentesis.  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    #COPD   -not in exacerbation  -given COPD, [id number]% on greater acceptable. Avoid hyperoxygenation   -c/w spiriva, Symbicort and albuterol PRN  - pulm recommendations appreciated    #HLD  -cont lipitor    #enterovirus  -supportive care    #HTN  -cont hydralazine  -cont toprol  -cont torsemide    #DVT PPX  -holding eliquis per pulm recs as thoracocentesis was bloody  -unable to give SCDs due to LE blisters     #FULL CODE  -palliative consulted    Case discussed with Dr Gilliland

## 2023-12-22 NOTE — PROGRESS NOTE ADULT - SUBJECTIVE AND OBJECTIVE BOX
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 Patient with recent hospitalization for CHF exacerbation 11/22-11/27 and he left AMA. Patient is a poor historian and unable to give reliable history. Denies any chest pain, palpitations, N/V, HA, dizziness. Reports he takes his medications most of the time. He is concerned about blisters on his leg but unable to recognize HF exacerbation being the underlying issue. Also reports constipation but denies N/V/D. Reports lack of appetite but denies dysphagia.       Patient is a 89y old  Male who presents with a chief complaint of Shortness of breath/CHF exacerbation (20 Dec 2023 12:56)      INTERVAL HPI: Patient seen and examined at bedside.       MEDICATIONS  (STANDING):  allopurinol 100 milliGRAM(s) Oral daily  apixaban 2.5 milliGRAM(s) Oral every 12 hours  aspirin enteric coated 81 milliGRAM(s) Oral daily  atorvastatin 40 milliGRAM(s) Oral at bedtime  bacitracin   Ointment 1 Application(s) Topical daily  budesonide 160 MICROgram(s)/formoterol 4.5 MICROgram(s) Inhaler 2 Puff(s) Inhalation two times a day  furosemide   Injectable 40 milliGRAM(s) IV Push two times a day  hydrALAZINE 25 milliGRAM(s) Oral every 8 hours  melatonin 3 milliGRAM(s) Oral at bedtime  metoprolol succinate ER 50 milliGRAM(s) Oral daily  pantoprazole    Tablet 40 milliGRAM(s) Oral before breakfast  polyethylene glycol 3350 17 Gram(s) Oral two times a day  senna 2 Tablet(s) Oral at bedtime  tiotropium 2.5 MICROgram(s) Inhaler 2 Puff(s) Inhalation daily    MEDICATIONS  (PRN):  acetaminophen     Tablet .. 650 milliGRAM(s) Oral every 6 hours PRN Temp greater or equal to 38C (100.4F), Mild Pain (1 - 3)  albuterol    90 MICROgram(s) HFA Inhaler 2 Puff(s) Inhalation every 6 hours PRN Shortness of Breath and/or Wheezing      Allergies    No Known Allergies    Intolerances        REVIEW OF SYSTEMS:  CONSTITUTIONAL: No weakness, fevers or chills  EYES/ENT: No visual changes;  No vertigo or throat pain   NECK: No pain or stiffness  RESPIRATORY: improving shortness of breath   CARDIOVASCULAR: No chest pain or palpitations  GASTROINTESTINAL: No abdominal or epigastric pain. No nausea, vomiting, or hematemesis; No diarrhea or constipation. No melena or hematochezia.  GENITOURINARY: No dysuria, frequency or hematuria  NEUROLOGICAL: No numbness or weakness  SKIN: +blisters b/l   All other review of systems is negative unless indicated above    Vital Signs Last 24 Hrs  T(C): 36.7 (21 Dec 2023 04:56), Max: 36.7 (20 Dec 2023 20:34)  T(F): 98 (21 Dec 2023 04:56), Max: 98 (20 Dec 2023 20:34)  HR: 90 (21 Dec 2023 08:35) (71 - 90)  BP: 123/78 (21 Dec 2023 04:56) (106/52 - 156/68)  BP(mean): --  RR: 17 (21 Dec 2023 04:56) (16 - 17)  SpO2: 95% (21 Dec 2023 08:35) (94% - 95%)    Parameters below as of 21 Dec 2023 08:35  Patient On (Oxygen Delivery Method): room air      I&O's Summary    20 Dec 2023 07:01  -  21 Dec 2023 07:00  --------------------------------------------------------  IN: 120 mL / OUT: 200 mL / NET: -80 mL    21 Dec 2023 07:01  -  21 Dec 2023 09:31  --------------------------------------------------------  IN: 120 mL / OUT: 0 mL / NET: 120 mL      BMI (kg/m2): 25 (12-18-23 @ 09:52)      PHYSICAL EXAM:  Constitutional: Pt lying in bed, awake and alert, NAD  HEENT: EOMI, normal hearing, moist mucous membranes  Neck: Soft and supple, no JVD  Respiratory: CTABL, No wheezing, bibasilar rales noted worse on L  Cardiovascular: S1S2+, RRR, no M/G/R  Gastrointestinal: BS+, soft, NT/ND, no guarding, no rebound  Extremities: 2+ pitting edema b/l LE, LE blisters noted b/l  Vascular: 2+ peripheral pulses  Neurological: AAOx3, no focal deficits      LABS: Personally reviewed  CBC                        9.4    8.41  )-----------( 146      ( 21 Dec 2023 07:56 )             29.3     CMP  12-21    144  |  103  |  93  ----------------------------<  119  4.5   |  30  |  2.03    Ca    8.8      21 Dec 2023 07:56  Phos  4.3     12-19  Mg     2.4     12-19    TPro  7.3  /  Alb  2.7  /  TBili  0.8  /  DBili  x   /  AST  20  /  ALT  16  /  AlkPhos  62  12-21          PT/INR - ( 18 Dec 2023 10:15 )   PT: 19.1 sec;   INR: 1.66 ratio       PTT - ( 18 Dec 2023 10:15 )  PTT:36.4 sec      CARDIAC MARKERS ( 18 Dec 2023 10:15 )  x     / 58.8 ng/L / x     / x     / x              Urinalysis Basic - ( 21 Dec 2023 07:56 )    Color: x / Appearance: x / SG: x / pH: x  Gluc: 119 mg/dL / Ketone: x  / Bili: x / Urobili: x   Blood: x / Protein: x / Nitrite: x   Leuk Esterase: x / RBC: x / WBC x   Sq Epi: x / Non Sq Epi: x / Bacteria: x        Culture - Fungal, Body Fluid (collected 19 Dec 2023 13:46)  Source: Pleural Fl Pleural Fluid  Preliminary Report (20 Dec 2023 07:19):    Testing in progress    Culture - Body Fluid with Gram Stain (collected 19 Dec 2023 13:46)  Source: Pleural Fl Pleural Fluid  Gram Stain (20 Dec 2023 02:07):    polymorphonuclear leukocytes seen    No organisms seen    by cytocentrifuge  Preliminary Report (20 Dec 2023 19:27):    No growth    Culture - Fungal, Body Fluid (collected 12-19-23 @ 13:46)  Source: Pleural Fl Pleural Fluid  Preliminary Report (12-20-23 @ 07:19):    Testing in progress    Culture - Body Fluid with Gram Stain (collected 12-19-23 @ 13:46)  Source: Pleural Fl Pleural Fluid  Gram Stain (12-20-23 @ 02:07):    polymorphonuclear leukocytes seen    No organisms seen    by cytocentrifuge  Preliminary Report (12-20-23 @ 19:27):    No growth        RADIOLOGY & ADDITIONAL TESTS: Personally reviewed.     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 Patient with recent hospitalization for CHF exacerbation 11/22-11/27 and he left AMA. Patient is a poor historian and unable to give reliable history. Denies any chest pain, palpitations, N/V, HA, dizziness. Reports he takes his medications most of the time. He is concerned about blisters on his leg but unable to recognize HF exacerbation being the underlying issue. Also reports constipation but denies N/V/D. Reports lack of appetite but denies dysphagia.         INTERVAL HPI: Patient seen and examined at bedside. NAD. Anxious to go home today.      MEDICATIONS  (STANDING):  allopurinol 100 milliGRAM(s) Oral daily  apixaban 2.5 milliGRAM(s) Oral every 12 hours  aspirin enteric coated 81 milliGRAM(s) Oral daily  atorvastatin 40 milliGRAM(s) Oral at bedtime  bacitracin   Ointment 1 Application(s) Topical daily  budesonide 160 MICROgram(s)/formoterol 4.5 MICROgram(s) Inhaler 2 Puff(s) Inhalation two times a day  furosemide   Injectable 40 milliGRAM(s) IV Push two times a day  hydrALAZINE 25 milliGRAM(s) Oral every 8 hours  melatonin 3 milliGRAM(s) Oral at bedtime  metoprolol succinate ER 50 milliGRAM(s) Oral daily  pantoprazole    Tablet 40 milliGRAM(s) Oral before breakfast  polyethylene glycol 3350 17 Gram(s) Oral two times a day  senna 2 Tablet(s) Oral at bedtime  tiotropium 2.5 MICROgram(s) Inhaler 2 Puff(s) Inhalation daily    MEDICATIONS  (PRN):  acetaminophen     Tablet .. 650 milliGRAM(s) Oral every 6 hours PRN Temp greater or equal to 38C (100.4F), Mild Pain (1 - 3)  albuterol    90 MICROgram(s) HFA Inhaler 2 Puff(s) Inhalation every 6 hours PRN Shortness of Breath and/or Wheezing      Allergies    No Known Allergies    Intolerances    Vital Signs Last 24 Hrs  T(C): 36.7 (21 Dec 2023 04:56), Max: 36.7 (20 Dec 2023 20:34)  T(F): 98 (21 Dec 2023 04:56), Max: 98 (20 Dec 2023 20:34)  HR: 90 (21 Dec 2023 08:35) (71 - 90)  BP: 123/78 (21 Dec 2023 04:56) (106/52 - 156/68)  BP(mean): --  RR: 17 (21 Dec 2023 04:56) (16 - 17)  SpO2: 95% (21 Dec 2023 08:35) (94% - 95%)    Parameters below as of 21 Dec 2023 08:35  Patient On (Oxygen Delivery Method): room air      I&O's Summary    20 Dec 2023 07:01  -  21 Dec 2023 07:00  --------------------------------------------------------  IN: 120 mL / OUT: 200 mL / NET: -80 mL    21 Dec 2023 07:01  -  21 Dec 2023 09:31  --------------------------------------------------------  IN: 120 mL / OUT: 0 mL / NET: 120 mL      BMI (kg/m2): 25 (12-18-23 @ 09:52)      PHYSICAL EXAM:  Constitutional: Pt lying in bed, awake and alert, NAD  HEENT: EOMI, normal hearing, moist mucous membranes  Neck: Soft and supple, no JVD  Respiratory: CTABL, No wheezing, bibasilar rales noted worse on L  Cardiovascular: S1S2+, RRR, no M/G/R  Gastrointestinal: BS+, soft, NT/ND, no guarding, no rebound  Extremities: 2+ pitting edema b/l LE, LE blisters noted b/l  Vascular: 2+ peripheral pulses  Neurological: AAOx3, no focal deficits      LABS: Personally reviewed                        9.4    9.05  )-----------( 135      ( 22 Dec 2023 07:24 )             29.0   12-22    143  |  102  |  105<H>  ----------------------------<  119<H>  4.5   |  33<H>  |  2.05<H>    Ca    9.1      22 Dec 2023 07:24    TPro  7.5  /  Alb  2.7<L>  /  TBili  0.7  /  DBili  x   /  AST  18  /  ALT  15  /  AlkPhos  62  12-22            Urinalysis Basic - ( 21 Dec 2023 07:56 )    Color: x / Appearance: x / SG: x / pH: x  Gluc: 119 mg/dL / Ketone: x  / Bili: x / Urobili: x   Blood: x / Protein: x / Nitrite: x   Leuk Esterase: x / RBC: x / WBC x   Sq Epi: x / Non Sq Epi: x / Bacteria: x        Culture - Fungal, Body Fluid (collected 19 Dec 2023 13:46)  Source: Pleural Fl Pleural Fluid  Preliminary Report (20 Dec 2023 07:19):    Testing in progress    Culture - Body Fluid with Gram Stain (collected 19 Dec 2023 13:46)  Source: Pleural Fl Pleural Fluid  Gram Stain (20 Dec 2023 02:07):    polymorphonuclear leukocytes seen    No organisms seen    by cytocentrifuge  Preliminary Report (20 Dec 2023 19:27):    No growth    Culture - Fungal, Body Fluid (collected 12-19-23 @ 13:46)  Source: Pleural Fl Pleural Fluid  Preliminary Report (12-20-23 @ 07:19):    Testing in progress    Culture - Body Fluid with Gram Stain (collected 12-19-23 @ 13:46)  Source: Pleural Fl Pleural Fluid  Gram Stain (12-20-23 @ 02:07):    polymorphonuclear leukocytes seen    No organisms seen    by cytocentrifuge  Preliminary Report (12-20-23 @ 19:27):    No growth        RADIOLOGY & ADDITIONAL TESTS: Personally reviewed.

## 2023-12-22 NOTE — PROGRESS NOTE ADULT - SUBJECTIVE AND OBJECTIVE BOX
RAD SINGLETON  [id number]      Chief Complaint: Acute on chronic diastolic heart failure exacerbation     Interval History: The patient reports leg swelling is improving. On room air    Tele: atrial fibrillation 60-70s BPM      Current meds:   acetaminophen     Tablet .. 650 milliGRAM(s) Oral every 6 hours PRN  albuterol    90 MICROgram(s) HFA Inhaler 2 Puff(s) Inhalation every 6 hours PRN  allopurinol 100 milliGRAM(s) Oral daily  aspirin enteric coated 81 milliGRAM(s) Oral daily  atorvastatin 40 milliGRAM(s) Oral at bedtime  bacitracin   Ointment 1 Application(s) Topical daily  budesonide 160 MICROgram(s)/formoterol 4.5 MICROgram(s) Inhaler 2 Puff(s) Inhalation two times a day  furosemide   Injectable 40 milliGRAM(s) IV Push two times a day  hydrALAZINE 25 milliGRAM(s) Oral every 8 hours  melatonin 3 milliGRAM(s) Oral at bedtime  metoprolol succinate ER 50 milliGRAM(s) Oral daily  pantoprazole    Tablet 40 milliGRAM(s) Oral before breakfast  polyethylene glycol 3350 17 Gram(s) Oral two times a day  senna 2 Tablet(s) Oral at bedtime  tiotropium 2.5 MICROgram(s) Inhaler 2 Puff(s) Inhalation daily      Objective:     Vital Signs:   T(C): 36.5 (12-20-23 @ 04:58), Max: 36.6 (12-19-23 @ 20:49)  HR: 87 (12-20-23 @ 08:17) (69 - 87)  BP: 110/51 (12-20-23 @ 04:58) (106/63 - 117/75)  RR: 17 (12-20-23 @ 04:58) (17 - 17)  SpO2: 98% (12-20-23 @ 08:17) (96% - 99%)  Wt(kg): --    Physical Exam:   General: no acute distress  Neck: supple   CVS: JVP ~ 9 cm H20, irregularly irregular, s1, s2  Pulm: unlabored respirations, decreased breath sounds   Ext: 2+ b/l lower extremity edema with ulceration   Neuro: awake, alert and oriented     Labs:   20 Dec 2023 05:57    144    |  103    |  83     ----------------------------<  150    4.0     |  34     |  2.09     Ca    8.6        20 Dec 2023 05:57  Phos  4.3       19 Dec 2023 06:09  Mg     2.4       19 Dec 2023 06:09                            9.6    8.12  )-----------( 154      ( 20 Dec 2023 05:57 )             31.1         TTE (10/2023):   1. Limited echocardiogram performed.   2. Low normal global left ventricular systolic function.   3. Left ventricular ejection fraction, by visual estimation, is 50%.   4. Normal right ventricular size and function.   5. Left atrial enlargement.   6. Right atrial enlargement.   7. Moderate thickening and calcification of the anterior and posterior mitral valve leaflets.   8. Moderately decreased mitral leaflet mobility.   9. Moderate mitral valve stenosis (mean gradient    7 mmHg at HR 77 BPM, MVA 1.1 cm^2).  10. Mild tricuspid regurgitation.  11. Large pleural effusion in the left lateral region.  12. Small pericardial effusion.    TTE (12/19/23):   1. The heart rhythm is irregular throughout the study.   2. Normal global left ventricular systolic function.   3. Left ventricular ejection fraction, by visual estimation, is 50 to 55%.   4. Mildly increased LV wall thickness.   5. Normal left ventricular internal cavity size.   6. The right ventricle is not well visualized, appears generally normal   in size.   7. Left atrial enlargement.   8. Right atrial enlargement.   9. Mild mitral annular calcification.  10. Moderate thickening and calcification of the anterior and posterior mitral valve leaflets.  11. Moderate mitral valve stenosis (mean gradient 8 mmHg at HR 69 BPM, MVA 1.3 cm^2 by  msec).  12. Mild mitral valve regurgitation.  13. Mild-moderate tricuspid regurgitation.  14. Aortic valve leaflet calcification. No aortic valve stenosis.  15. Sclerotic aortic valve with normal opening.  16. Mild aortic regurgitation.  17. Estimated pulmonary artery systolic pressure is 38.0 mmHg assuming a right atrial pressure of 15 mmHg, which is consistent with borderline pulmonary hypertension.  18. Moderate pleural effusion in both left and right lateral regions.  19. There is no evidence of pericardial effusion.      ECG (12/18/23): atrial fibrillation, nonspecific ST abnormalities      RAD SINGLETON  [id number]      Chief Complaint: Acute on chronic diastolic heart failure exacerbation     Interval History: The patient reports leg swelling is improving. On room air    Tele: atrial fibrillation 60-70s BPM      Current meds:   acetaminophen     Tablet .. 650 milliGRAM(s) Oral every 6 hours PRN  albuterol    90 MICROgram(s) HFA Inhaler 2 Puff(s) Inhalation every 6 hours PRN  allopurinol 100 milliGRAM(s) Oral daily  aspirin enteric coated 81 milliGRAM(s) Oral daily  atorvastatin 40 milliGRAM(s) Oral at bedtime  bacitracin   Ointment 1 Application(s) Topical daily  budesonide 160 MICROgram(s)/formoterol 4.5 MICROgram(s) Inhaler 2 Puff(s) Inhalation two times a day  furosemide   Injectable 40 milliGRAM(s) IV Push two times a day  hydrALAZINE 25 milliGRAM(s) Oral every 8 hours  melatonin 3 milliGRAM(s) Oral at bedtime  metoprolol succinate ER 50 milliGRAM(s) Oral daily  pantoprazole    Tablet 40 milliGRAM(s) Oral before breakfast  polyethylene glycol 3350 17 Gram(s) Oral two times a day  senna 2 Tablet(s) Oral at bedtime  tiotropium 2.5 MICROgram(s) Inhaler 2 Puff(s) Inhalation daily      Objective:     Vital Signs:   T(C): 36.5 (12-20-23 @ 04:58), Max: 36.6 (12-19-23 @ 20:49)  HR: 87 (12-20-23 @ 08:17) (69 - 87)  BP: 110/51 (12-20-23 @ 04:58) (106/63 - 117/75)  RR: 17 (12-20-23 @ 04:58) (17 - 17)  SpO2: 98% (12-20-23 @ 08:17) (96% - 99%)  Wt(kg): --    Physical Exam:   General: no acute distress  Neck: supple   CVS: JVP ~ 9 cm H20, irregularly irregular, s1, s2  Pulm: unlabored respirations, decreased breath sounds   Ext: 2+ b/l lower extremity edema with ulceration   Neuro: awake, alert and oriented     Labs:   20 Dec 2023 05:57    144    |  103    |  83     ----------------------------<  150    4.0     |  34     |  2.09     Ca    8.6        20 Dec 2023 05:57  Phos  4.3       19 Dec 2023 06:09  Mg     2.4       19 Dec 2023 06:09                            9.6    8.12  )-----------( 154      ( 20 Dec 2023 05:57 )             31.1         TTE (10/2023):   1. Limited echocardiogram performed.   2. Low normal global left ventricular systolic function.   3. Left ventricular ejection fraction, by visual estimation, is 50%.   4. Normal right ventricular size and function.   5. Left atrial enlargement.   6. Right atrial enlargement.   7. Moderate thickening and calcification of the anterior and posterior mitral valve leaflets.   8. Moderately decreased mitral leaflet mobility.   9. Moderate mitral valve stenosis (mean gradient    7 mmHg at HR 77 BPM, MVA 1.1 cm^2).  10. Mild tricuspid regurgitation.  11. Large pleural effusion in the left lateral region.  12. Small pericardial effusion.    TTE (12/19/23):   1. The heart rhythm is irregular throughout the study.   2. Normal global left ventricular systolic function.   3. Left ventricular ejection fraction, by visual estimation, is 50 to 55%.   4. Mildly increased LV wall thickness.   5. Normal left ventricular internal cavity size.   6. The right ventricle is not well visualized, appears generally normal   in size.   7. Left atrial enlargement.   8. Right atrial enlargement.   9. Mild mitral annular calcification.  10. Moderate thickening and calcification of the anterior and posterior mitral valve leaflets.  11. Moderate mitral valve stenosis (mean gradient 8 mmHg at HR 69 BPM, MVA 1.3 cm^2 by  msec).  12. Mild mitral valve regurgitation.  13. Mild-moderate tricuspid regurgitation.  14. Aortic valve leaflet calcification. No aortic valve stenosis.  15. Sclerotic aortic valve with normal opening.  16. Mild aortic regurgitation.  17. Estimated pulmonary artery systolic pressure is 38.0 mmHg assuming a right atrial pressure of 15 mmHg, which is consistent with borderline pulmonary hypertension.  18. Moderate pleural effusion in both left and right lateral regions.  19. There is no evidence of pericardial effusion.      ECG (12/18/23): atrial fibrillation, nonspecific ST abnormalities

## 2023-12-22 NOTE — DISCHARGE NOTE PROVIDER - NSDCCPCAREPLAN_GEN_ALL_CORE_FT
PRINCIPAL DISCHARGE DIAGNOSIS  Diagnosis: CHF exacerbation  Assessment and Plan of Treatment: pt with recurrent left pleural effusion with symptomatic relief with thoracocentesis and aggressive IV lasix. pulm recommends for transfer to thoracic surgery for placement of pleurx tube.      SECONDARY DISCHARGE DIAGNOSES  Diagnosis: Pleural effusion  Assessment and Plan of Treatment:      PRINCIPAL DISCHARGE DIAGNOSIS  Diagnosis: CHF exacerbation  Assessment and Plan of Treatment: pt with recurrent left pleural effusion with symptomatic relief with thoracocentesis and aggressive IV lasix. pulm recommends for transfer to thoracic surgery for placement of pleurx tube.      SECONDARY DISCHARGE DIAGNOSES  Diagnosis: Pleural effusion  Assessment and Plan of Treatment: On this admission you were nothed to have left pleural effusion. You have a chronic history of this issue with repeated treatment of fluid accumulation via drainage. It was advised you were seen at a higher level of care for evaluation by thoraci surgery for placement of pleurx tube. You have been transferred to [facility] for further specialty care.    Diagnosis: Acute cholecystitis  Assessment and Plan of Treatment: During this hospitalization while awaiting transfer to [facility] for lung condition you were found to have acute cholecystitis after complains of right upper quadrant abdominal pain. This happened on the day of transfer. Gastroenterology and Surgery teams were consulted and their verbal recommendations appreciated making your transfer increasinly beneficial. You are currently stable, without fever, elevation of WBC indicative of acute infection, so your transfer is made possible. Your transfer team has been made aware of this finding.     PRINCIPAL DISCHARGE DIAGNOSIS  Diagnosis: CHF exacerbation  Assessment and Plan of Treatment: pt with recurrent left pleural effusion with symptomatic relief with thoracocentesis and aggressive IV lasix. pulm recommends for transfer to thoracic surgery for placement of pleurx tube.      SECONDARY DISCHARGE DIAGNOSES  Diagnosis: Pleural effusion  Assessment and Plan of Treatment: On this admission you were nothed to have left pleural effusion. You have a chronic history of this issue with repeated treatment of fluid accumulation via drainage. It was advised you were seen at a higher level of care for evaluation by thoraci surgery for placement of pleurx tube. You have been transferred to [facility] for further specialty care.    Diagnosis: Acute cholecystitis  Assessment and Plan of Treatment: During this hospitalization while awaiting transfer to [facility] for lung condition you were found to have acute cholecystitis after complains of right upper quadrant abdominal pain. This happened on the day of transfer. Gastroenterology and Surgery teams were consulted and their verbal recommendations appreciated making your transfer increasinly beneficial. You are currently stable, without fever, elevation of WBC indicative of acute infection, so your transfer is made possible. Your transfer team has been made aware of this finding.

## 2023-12-22 NOTE — DISCHARGE NOTE PROVIDER - ATTENDING DISCHARGE PHYSICAL EXAMINATION:
Constitutional: Pt sitting in chair, awake and alert, NAD, NC in place  HEENT: EOMI, normal hearing, moist mucous membranes  Respiratory: nonlabored breathing, good air entry, decreased breath sounds on left  Cardiovascular: S1S2+, RRR, no M/G/R  Gastrointestinal: BS+, soft, NT/ND, no guarding, no rebound  Extremities: 2+ pitting edema b/l LE, open LE blisters covered with bacitracin  Vascular: 2+ peripheral pulses  Neurological: AAOx3, no focal deficits

## 2023-12-22 NOTE — PROGRESS NOTE ADULT - ASSESSMENT
Assessment:  Jaylon Antony is an 89 year old man with past medical history of Atrial fibrillation (on Eliquis), HFrEF (LVEF 25-30% in 2019), Hypertension, Chronic kidney disease, COPD, history of CVA with recurrent hospitalizations for congestive heart failure likely from medication noncompliance now presents with progressive lower extremity edema and shortness of breath, suggestive of acute on chronic diastolic heart failure exacerbation.     ECG consistent with atrial fibrillation and nonspecific ST abnormalities. Troponins are not elevated in range that would suggest an acute coronary syndrome. Pro BNP elevated but less than prior CHF hospitalization. Chest xray with increasing left effusion. The patient is not able to explain what medications he takes and there is concern for medication noncompliance.     Repeat echo with LVEF 50-55% and moderate mitral valve stenosis.     Recommendations:  [] HFpEF, Moderate mitral stenosis: Likely secondary to component of noncompliance. s/p thoracentesis with 1L fluid removed, cytology nonmalignant.  Lasix increased to 60 ivp per primary team (pt on Torsemide at home). Monitor renal function. If renal function stabilizes then may consider SGLT2-I this admission. Patient not interested in surgical procedures for treatment for the mitral stenosis due to his age of 89.  [] Atrial fibrillation: Continue beta blocker, resume Eliquis for stroke prophylaxis once safe per Pulmonary .     Carlotta CONNLELY-BC           Assessment:  Jaylon Antony is an 89 year old man with past medical history of Atrial fibrillation (on Eliquis), HFrEF (LVEF 25-30% in 2019), Hypertension, Chronic kidney disease, COPD, history of CVA with recurrent hospitalizations for congestive heart failure likely from medication noncompliance now presents with progressive lower extremity edema and shortness of breath, suggestive of acute on chronic diastolic heart failure exacerbation.     ECG consistent with atrial fibrillation and nonspecific ST abnormalities. Troponins are not elevated in range that would suggest an acute coronary syndrome. Pro BNP elevated but less than prior CHF hospitalization. Chest xray with increasing left effusion. The patient is not able to explain what medications he takes and there is concern for medication noncompliance.     Repeat echo with LVEF 50-55% and moderate mitral valve stenosis.     Recommendations:  [] HFpEF, Moderate mitral stenosis: Likely secondary to component of noncompliance. s/p thoracentesis with 1L fluid removed, cytology nonmalignant.  Lasix increased to 60 ivp per primary team (pt on Torsemide at home). Monitor renal function. If renal function stabilizes then may consider SGLT2-I this admission. Patient not interested in surgical procedures for treatment for the mitral stenosis due to his age of 89.  [] Atrial fibrillation: Continue beta blocker, resume Eliquis for stroke prophylaxis once safe per Pulmonary .     Carlotta CONNELLY-BC           Assessment:  Jaylon Antony is an 89 year old man with past medical history of Atrial fibrillation (on Eliquis), HFrEF (LVEF 25-30% in 2019), Hypertension, Chronic kidney disease, COPD, history of CVA with recurrent hospitalizations for congestive heart failure likely from medication noncompliance now presents with progressive lower extremity edema and shortness of breath, suggestive of acute on chronic diastolic heart failure exacerbation.     ECG consistent with atrial fibrillation and nonspecific ST abnormalities. Troponins are not elevated in range that would suggest an acute coronary syndrome. Pro BNP elevated but less than prior CHF hospitalization. Chest xray with increasing left effusion. The patient is not able to explain what medications he takes and there is concern for medication noncompliance.     Repeat echo with LVEF 50-55% and moderate mitral valve stenosis.     Recommendations:  [] HFpEF, Moderate mitral stenosis: Likely secondary to component of noncompliance. s/p thoracentesis with 1L fluid removed, cytology nonmalignant.  Lasix increased to 60 ivp per primary team (pt on Torsemide at home). Monitor renal function. If renal function stabilizes then may consider SGLT2-I this admission. Patient not interested in surgical procedures for treatment for the mitral stenosis due to his age of 89.  [] Atrial fibrillation: Continue beta blocker, resume Eliquis for stroke prophylaxis once safe per Pulmonary .     [facility]      cardio attending opd chart review  Patient recently seen by Dr. chisholm prior stroke with dysarthria due to embolization chest pains hypertension dyspnea ischemic cardiomyopathy non rheumatic  mitral valve stenosis edema unstable angina prior smoker bradycardia coronary disease status post PCI drug-eluting stent RCA 7/8/10   . stress test not performed. echo left atrial enlargement mitral stenosis chronic. wiith area 1 cm,²     check cytology when available     Assessment:  Jaylon Antony is an 89 year old man with past medical history of Atrial fibrillation (on Eliquis), HFrEF (LVEF 25-30% in 2019), Hypertension, Chronic kidney disease, COPD, history of CVA with recurrent hospitalizations for congestive heart failure likely from medication noncompliance now presents with progressive lower extremity edema and shortness of breath, suggestive of acute on chronic diastolic heart failure exacerbation.     ECG consistent with atrial fibrillation and nonspecific ST abnormalities. Troponins are not elevated in range that would suggest an acute coronary syndrome. Pro BNP elevated but less than prior CHF hospitalization. Chest xray with increasing left effusion. The patient is not able to explain what medications he takes and there is concern for medication noncompliance.     Repeat echo with LVEF 50-55% and moderate mitral valve stenosis.     Recommendations:  [] HFpEF, Moderate mitral stenosis: Likely secondary to component of noncompliance. s/p thoracentesis with 1L fluid removed, cytology nonmalignant.  Lasix increased to 60 ivp per primary team (pt on Torsemide at home). Monitor renal function. If renal function stabilizes then may consider SGLT2-I this admission. Patient not interested in surgical procedures for treatment for the mitral stenosis due to his age of 89.  [] Atrial fibrillation: Continue beta blocker, resume Eliquis for stroke prophylaxis once safe per Pulmonary .     Carl[facility]      cardio attending opd chart review  Patient recently seen by Dr. chisholm prior stroke with dysarthria due to embolization chest pains hypertension dyspnea ischemic cardiomyopathy non rheumatic  mitral valve stenosis edema unstable angina prior smoker bradycardia coronary disease status post PCI drug-eluting stent RCA 7/8/10   . stress test not performed. echo left atrial enlargement mitral stenosis chronic. wiith area 1 cm,²     check cytology when available

## 2023-12-22 NOTE — DISCHARGE NOTE PROVIDER - HOSPITAL COURSE
Hospital Course:  Per admitting physician: Dr. 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 Patient with recent hospitalization for CHF exacerbation 11/22-11/27 and he left AMA. Patient is a poor historian and unable to give reliable history.  Reports he takes his medications most of the time. He is concerned about blisters on his leg but unable to recognize HF exacerbation being the underlying issue. PCP reports he stopped lasix and started torsemide 100mg for fluid overload. He is concerned that patient is non compliant with recommendations and medications. Per daughter patient is in sound mind and does not stay in the hospital to complete treatment and signs out AMA. As far as she knows, patient has been compliant with medications. He is still ambulatory and goes to work daily. In the ED, pt was found to be hypoxic on room air at 89%. IV lasix 40 BID x1 given in ED. Admitted for Acute on chronic diastolic Heart Failure exacerbation in the setting of recurrent pleural effusion. Cardiology was consulted, recs appreciated: c/w IV Lasix 40 BID, Pulmonary evaluation for thoracocentesis and farxiga on discharge if renal function stabilizes. TTE Limited: LVEF 50-55%, moderate mitral valve stenosis with thickened and calcified leaflets, moderate pleural effusion. Pulmonary consulted, recs appreciated:       Vital Signs Last 24 Hrs  T(C): 36.5 (18 Dec 2023 09:52), Max: 36.5 (18 Dec 2023 09:52)  T(F): 97.7 (18 Dec 2023 09:52), Max: 97.7 (18 Dec 2023 09:52)  HR: 76 (18 Dec 2023 12:37) (76 - 97)  BP: 125/79 (18 Dec 2023 12:37) (122/78 - 130/95)  BP(mean): --  RR: 18 (18 Dec 2023 12:37) (18 - 22)  SpO2: 95% (18 Dec 2023 12:37) (94% - 97%)    Parameters below as of 18 Dec 2023 12:37  Patient On (Oxygen Delivery Method): room air       (18 Dec 2023 14:27)      Medical Floor Course:  RAD SINGLETON was admitted to medical floor under the impression of ***.     Source of Infection:  Antibiotic / Last Day:    Palliative Care / Advanced Care Planning  Code Status:  Patient/Family agreeable to Hospice/Palliative (Y/N)?  Summary of Goals of Care Conversation:    Discharging Provider:  Peter Gilliland MD  Contact Info: Cell [phone] - Please call with any questions or concerns.    Outpatient Provider:    Signout given to  SNF Provider:    I, the attending physician, was physically present for the key portions of the evaluation and management (E/M) service provided. The total amount of time spent reviewing the hospital course, laboratory values, imaging findings, assessing/counseling the patient, discussing with consultant physicians, social work, nursing staff was *** minutes.    Hospital Course:  Per admitting physician: Dr. 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 Patient with recent hospitalization for CHF exacerbation 11/22-11/27 and he left AMA. Patient is a poor historian and unable to give reliable history.  Reports he takes his medications most of the time. He is concerned about blisters on his leg but unable to recognize HF exacerbation being the underlying issue. PCP reports he stopped lasix and started torsemide 100mg for fluid overload. He is concerned that patient is non compliant with recommendations and medications. Per daughter patient is in sound mind and does not stay in the hospital to complete treatment and signs out AMA. As far as she knows, patient has been compliant with medications. He is still ambulatory and goes to work daily. In the ED, pt was found to be hypoxic on room air at 89%. IV lasix 40 BID x1 given in ED. Admitted for Acute on chronic diastolic Heart Failure exacerbation in the setting of recurrent pleural effusion. Cardiology was consulted, recs appreciated: c/w IV Lasix 40 BID, Pulmonary evaluation for thoracocentesis and farxiga on discharge if renal function stabilizes. TTE Limited: LVEF 50-55%, moderate mitral valve stenosis with thickened and calcified leaflets, moderate pleural effusion. Pulmonary consulted, recs appreciated:       Vital Signs Last 24 Hrs  T(C): 36.5 (18 Dec 2023 09:52), Max: 36.5 (18 Dec 2023 09:52)  T(F): 97.7 (18 Dec 2023 09:52), Max: 97.7 (18 Dec 2023 09:52)  HR: 76 (18 Dec 2023 12:37) (76 - 97)  BP: 125/79 (18 Dec 2023 12:37) (122/78 - 130/95)  BP(mean): --  RR: 18 (18 Dec 2023 12:37) (18 - 22)  SpO2: 95% (18 Dec 2023 12:37) (94% - 97%)    Parameters below as of 18 Dec 2023 12:37  Patient On (Oxygen Delivery Method): room air       (18 Dec 2023 14:27)      Medical Floor Course:  RAD SINGLETON was admitted to medical floor under the impression of ***.     Source of Infection:  Antibiotic / Last Day:    Palliative Care / Advanced Care Planning  Code Status:  Patient/Family agreeable to Hospice/Palliative (Y/N)?  Summary of Goals of Care Conversation:    Discharging Provider:  Peter Gilliland MD  Contact Info: Cell [phone] - Please call with any questions or concerns.    Outpatient Provider:    Signout given to  SNF Provider:    I, the attending physician, was physically present for the key portions of the evaluation and management (E/M) service provided. The total amount of time spent reviewing the hospital course, laboratory values, imaging findings, assessing/counseling the patient, discussing with consultant physicians, social work, nursing staff was *** minutes.    Hospital Course:  Per admitting physician: Dr. 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 Patient with recent hospitalization for CHF exacerbation 11/22-11/27 and he left AMA. Patient is a poor historian and unable to give reliable history.  Reports he takes his medications most of the time. He is concerned about blisters on his leg but unable to recognize HF exacerbation being the underlying issue. PCP reports he stopped lasix and started torsemide 100mg for fluid overload. He is concerned that patient is non compliant with recommendations and medications. Per daughter patient is in sound mind and does not stay in the hospital to complete treatment and signs out AMA. As far as she knows, patient has been compliant with medications. He is still ambulatory and goes to work daily. In the ED, pt was found to be hypoxic on room air at 89%. IV lasix 40 BID x1 given in ED. Admitted for Acute on chronic diastolic Heart Failure exacerbation in the setting of recurrent pleural effusion. Cardiology was consulted, recs appreciated: c/w IV Lasix 40 BID, Pulmonary evaluation for thoracocentesis and farxiga on discharge if renal function stabilizes. TTE Limited: LVEF 50-55%, moderate mitral valve stenosis with thickened and calcified leaflets, moderate pleural effusion. Pulmonary consulted, recs appreciated. pt with multiple hospitalizations since 9/2023 for shortness of breath and recurrent pleural effusion requiring thoracocentesis. pt with thoracocentesis this admission with 1L drained and aggressive diuresis with lasix with improvement of symptoms. pulm recommended for transfer for thoracic surgery for placement of pleurx tube.  today, pt's vitals are stable. pt on room air. pt is medically optimized for transfer.       Vital Signs Last 24 Hrs  T(C): 36.5 (18 Dec 2023 09:52), Max: 36.5 (18 Dec 2023 09:52)  T(F): 97.7 (18 Dec 2023 09:52), Max: 97.7 (18 Dec 2023 09:52)  HR: 76 (18 Dec 2023 12:37) (76 - 97)  BP: 125/79 (18 Dec 2023 12:37) (122/78 - 130/95)  BP(mean): --  RR: 18 (18 Dec 2023 12:37) (18 - 22)  SpO2: 95% (18 Dec 2023 12:37) (94% - 97%)    Parameters below as of 18 Dec 2023 12:37  Patient On (Oxygen Delivery Method): room air       (18 Dec 2023 14:27)      Medical Floor Course:  RAD SINGLETON was admitted to medical floor under the impression of ***.     Source of Infection:  Antibiotic / Last Day:    Palliative Care / Advanced Care Planning  Code Status:  Patient/Family agreeable to Hospice/Palliative (Y/N)?  Summary of Goals of Care Conversation:    Discharging Provider:  Peter Gilliland MD  Contact Info: Cell [phone] - Please call with any questions or concerns.    Outpatient Provider:    Signout given to  SNF Provider:    I, the attending physician, was physically present for the key portions of the evaluation and management (E/M) service provided. The total amount of time spent reviewing the hospital course, laboratory values, imaging findings, assessing/counseling the patient, discussing with consultant physicians, social work, nursing staff was *** minutes.    Hospital Course:  Per admitting physician: Dr. 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 Patient with recent hospitalization for CHF exacerbation 11/22-11/27 and he left AMA. Patient is a poor historian and unable to give reliable history.  Reports he takes his medications most of the time. He is concerned about blisters on his leg but unable to recognize HF exacerbation being the underlying issue. PCP reports he stopped lasix and started torsemide 100mg for fluid overload. He is concerned that patient is non compliant with recommendations and medications. Per daughter patient is in sound mind and does not stay in the hospital to complete treatment and signs out AMA. As far as she knows, patient has been compliant with medications. He is still ambulatory and goes to work daily. In the ED, pt was found to be hypoxic on room air at 89%. IV lasix 40 BID x1 given in ED. Admitted for Acute on chronic diastolic Heart Failure exacerbation in the setting of recurrent pleural effusion. Cardiology was consulted, recs appreciated: c/w IV Lasix 40 BID, Pulmonary evaluation for thoracocentesis and farxiga on discharge if renal function stabilizes. TTE Limited: LVEF 50-55%, moderate mitral valve stenosis with thickened and calcified leaflets, moderate pleural effusion. Pulmonary consulted, recs appreciated. pt with multiple hospitalizations since 9/2023 for shortness of breath and recurrent pleural effusion requiring thoracocentesis. pt with thoracocentesis this admission with 1L drained and aggressive diuresis with lasix with improvement of symptoms. pulm recommended for transfer for thoracic surgery for placement of pleurx tube.  today, pt's vitals are stable. pt on room air. pt is medically optimized for transfer.       Vital Signs Last 24 Hrs  T(C): 36.5 (18 Dec 2023 09:52), Max: 36.5 (18 Dec 2023 09:52)  T(F): 97.7 (18 Dec 2023 09:52), Max: 97.7 (18 Dec 2023 09:52)  HR: 76 (18 Dec 2023 12:37) (76 - 97)  BP: 125/79 (18 Dec 2023 12:37) (122/78 - 130/95)  BP(mean): --  RR: 18 (18 Dec 2023 12:37) (18 - 22)  SpO2: 95% (18 Dec 2023 12:37) (94% - 97%)    Parameters below as of 18 Dec 2023 12:37  Patient On (Oxygen Delivery Method): room air       (18 Dec 2023 14:27)      Medical Floor Course:  RAD SINGLETON was admitted to medical floor under the impression of ***.     Source of Infection:  Antibiotic / Last Day:    Palliative Care / Advanced Care Planning  Code Status:  Patient/Family agreeable to Hospice/Palliative (Y/N)?  Summary of Goals of Care Conversation:    Discharging Provider:  Peter Gilliland MD  Contact Info: Cell [phone] - Please call with any questions or concerns.    Outpatient Provider:    Signout given to  SNF Provider:    I, the attending physician, was physically present for the key portions of the evaluation and management (E/M) service provided. The total amount of time spent reviewing the hospital course, laboratory values, imaging findings, assessing/counseling the patient, discussing with consultant physicians, social work, nursing staff was *** minutes.    Hospital Course:  Per admitting physician: Dr. 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 Patient with recent hospitalization for CHF exacerbation 11/22-11/27 and he left AMA. Patient is a poor historian and unable to give reliable history.  Reports he takes his medications most of the time. He is concerned about blisters on his leg but unable to recognize HF exacerbation being the underlying issue. PCP reports he stopped lasix and started torsemide 100mg for fluid overload. He is concerned that patient is non compliant with recommendations and medications. Per daughter patient is in sound mind and does not stay in the hospital to complete treatment and signs out AMA. As far as she knows, patient has been compliant with medications. He is still ambulatory and goes to work daily. In the ED, pt was found to be hypoxic on room air at 89%. IV lasix 40 BID x1 given in ED. Admitted for Acute on chronic diastolic Heart Failure exacerbation in the setting of recurrent pleural effusion. Cardiology was consulted, recs appreciated: c/w IV Lasix 40 BID, Pulmonary evaluation for thoracocentesis and farxiga on discharge if renal function stabilizes. TTE Limited: LVEF 50-55%, moderate mitral valve stenosis with thickened and calcified leaflets, moderate pleural effusion. Pulmonary consulted, recs appreciated. pt with multiple hospitalizations since 9/2023 for shortness of breath and recurrent pleural effusion requiring thoracocentesis. pt with thoracocentesis this admission with 1L drained and aggressive diuresis with lasix with improvement of symptoms. pulm recommended for transfer for thoracic surgery for placement of pleurx tube.  today, pt's vitals are stable. pt on room air. pt is medically optimized for transfer.     Vital Signs Last 24 Hrs  T(C): 36.4 (27 Dec 2023 12:10), Max: 36.6 (26 Dec 2023 21:18)  T(F): 97.6 (27 Dec 2023 12:10), Max: 97.9 (26 Dec 2023 21:18)  HR: 72 (27 Dec 2023 12:10) (72 - 85)  BP: 114/73 (27 Dec 2023 12:10) (111/61 - 125/63)  BP(mean): --  RR: 15 (27 Dec 2023 12:10) (15 - 18)  SpO2: 96% (27 Dec 2023 12:10) (95% - 98%)    Parameters below as of 27 Dec 2023 12:10  Patient On (Oxygen Delivery Method): room air        Medical Floor Course:  RAD SINGLETON was admitted to medical floor under the impression of Dyspnea 2/2 CHF and chronic left pleural effusions.     Source of Infection:  Antibiotic / Last Day:    Palliative Care / Advanced Care Planning  Code Status: Full code  Patient/Family agreeable to Hospice/Palliative (Y/N)?  Summary of Goals of Care Conversation:    Discharging Provider:  Peter Gilliland MD  Contact Info: Cell [phone] - Please call with any questions or concerns.    Outpatient Provider:    Signout given to  SNF Provider:    I, the attending physician, was physically present for the key portions of the evaluation and management (E/M) service provided. The total amount of time spent reviewing the hospital course, laboratory values, imaging findings, assessing/counseling the patient, discussing with consultant physicians, social work, nursing staff was *** minutes.    Hospital Course:  Per admitting physician: Dr. 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 Patient with recent hospitalization for CHF exacerbation 11/22-11/27 and he left AMA. Patient is a poor historian and unable to give reliable history.  Reports he takes his medications most of the time. He is concerned about blisters on his leg but unable to recognize HF exacerbation being the underlying issue. PCP reports he stopped lasix and started torsemide 100mg for fluid overload. He is concerned that patient is non compliant with recommendations and medications. Per daughter patient is in sound mind and does not stay in the hospital to complete treatment and signs out AMA. As far as she knows, patient has been compliant with medications. He is still ambulatory and goes to work daily. In the ED, pt was found to be hypoxic on room air at 89%. IV lasix 40 BID x1 given in ED. Admitted for Acute on chronic diastolic Heart Failure exacerbation in the setting of recurrent pleural effusion. Cardiology was consulted, recs appreciated: c/w IV Lasix 40 BID, Pulmonary evaluation for thoracocentesis and farxiga on discharge if renal function stabilizes. TTE Limited: LVEF 50-55%, moderate mitral valve stenosis with thickened and calcified leaflets, moderate pleural effusion. Pulmonary consulted, recs appreciated. pt with multiple hospitalizations since 9/2023 for shortness of breath and recurrent pleural effusion requiring thoracocentesis. pt with thoracocentesis this admission with 1L drained and aggressive diuresis with lasix with improvement of symptoms. pulm recommended for transfer for thoracic surgery for placement of pleurx tube.  today, pt's vitals are stable. pt on room air. pt is medically optimized for transfer.     Vital Signs Last 24 Hrs  T(C): 36.4 (27 Dec 2023 12:10), Max: 36.6 (26 Dec 2023 21:18)  T(F): 97.6 (27 Dec 2023 12:10), Max: 97.9 (26 Dec 2023 21:18)  HR: 72 (27 Dec 2023 12:10) (72 - 85)  BP: 114/73 (27 Dec 2023 12:10) (111/61 - 125/63)  BP(mean): --  RR: 15 (27 Dec 2023 12:10) (15 - 18)  SpO2: 96% (27 Dec 2023 12:10) (95% - 98%)    Parameters below as of 27 Dec 2023 12:10  Patient On (Oxygen Delivery Method): room air        Medical Floor Course:  RAD SINGLETON was admitted to medical floor under the impression of Dyspnea 2/2 CHF and chronic left pleural effusions.     Source of Infection:  Antibiotic / Last Day:    Palliative Care / Advanced Care Planning  Code Status: Full code  Patient/Family agreeable to Hospice/Palliative (Y/N)?  Summary of Goals of Care Conversation:    Discharging Provider:  Peter Gilliland MD  Contact Info: Cell [phone] - Please call with any questions or concerns.    Outpatient Provider:    Signout given to  SNF Provider:    I, the attending physician, was physically present for the key portions of the evaluation and management (E/M) service provided. The total amount of time spent reviewing the hospital course, laboratory values, imaging findings, assessing/counseling the patient, discussing with consultant physicians, social work, nursing staff was *** minutes.    Hospital Course:  Per admitting physician: Dr. 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 Patient with recent hospitalization for CHF exacerbation 11/22-11/27 and he left AMA. Patient is a poor historian and unable to give reliable history.  Reports he takes his medications most of the time. He is concerned about blisters on his leg but unable to recognize HF exacerbation being the underlying issue. PCP reports he stopped lasix and started torsemide 100mg for fluid overload. He is concerned that patient is non compliant with recommendations and medications. Per daughter patient is in sound mind and does not stay in the hospital to complete treatment and signs out AMA. As far as she knows, patient has been compliant with medications. He is still ambulatory and goes to work daily. In the ED, pt was found to be hypoxic on room air at 89%. IV lasix 40 BID x1 given in ED. Admitted for Acute on chronic diastolic Heart Failure exacerbation in the setting of recurrent pleural effusion. Cardiology was consulted, recs appreciated: c/w IV Lasix 40 BID, Pulmonary evaluation for thoracocentesis and farxiga on discharge if renal function stabilizes. TTE Limited: LVEF 50-55%, moderate mitral valve stenosis with thickened and calcified leaflets, moderate pleural effusion. Pulmonary consulted, recs appreciated. pt with multiple hospitalizations since 9/2023 for shortness of breath and recurrent pleural effusion requiring thoracocentesis. pt with thoracocentesis this admission with 1L drained and aggressive diuresis with lasix with improvement of symptoms. pulm recommended for transfer for thoracic surgery for placement of pleurx tube.  today, pt's vitals are stable. pt on room air. Pt course complicated by darren on ckd 3 with improvement with held lasix restarted today, also noted RUQ u/s suspicion for acute cholecystitis with cholethiasis, lfts elevated but bili wnl, surgery was consulted and recommended transfer to tertiary for poss drain as poor surgical candidate given extensive pleural effusion and CHF, pt is medically optimized for transfer.     Vital Signs Last 24 Hrs  T(C): 36.4 (27 Dec 2023 12:10), Max: 36.6 (26 Dec 2023 21:18)  T(F): 97.6 (27 Dec 2023 12:10), Max: 97.9 (26 Dec 2023 21:18)  HR: 72 (27 Dec 2023 12:10) (72 - 85)  BP: 114/73 (27 Dec 2023 12:10) (111/61 - 125/63)  BP(mean): --  RR: 15 (27 Dec 2023 12:10) (15 - 18)  SpO2: 96% (27 Dec 2023 12:10) (95% - 98%)    Parameters below as of 27 Dec 2023 12:10  Patient On (Oxygen Delivery Method): room air        Medical Floor Course:  RAD SINGLETON was admitted to medical floor under the impression of Dyspnea 2/2 CHF and chronic left pleural effusions.     Source of Infection:  Antibiotic / Last Day:    Palliative Care / Advanced Care Planning  Code Status: Full code  Patient/Family agreeable to Hospice/Palliative (Y/N)?  Summary of Goals of Care Conversation:    Discharging Provider: Heber Licea, the attending physician, was physically present for the key portions of the evaluation and management (E/M) service provided. The total amount of time spent reviewing the hospital course, laboratory values, imaging findings, assessing/counseling the patient, discussing with consultant physicians, social work, nursing staff was 90 minutes.    Hospital Course:  Per admitting physician: Dr. 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 Patient with recent hospitalization for CHF exacerbation 11/22-11/27 and he left AMA. Patient is a poor historian and unable to give reliable history.  Reports he takes his medications most of the time. He is concerned about blisters on his leg but unable to recognize HF exacerbation being the underlying issue. PCP reports he stopped Lasix and started torsemide 100mg for fluid overload. He is concerned that patient is non compliant with recommendations and medications. Per daughter patient is in sound mind and does not stay in the hospital to complete treatment and signs out AMA. As far as she knows, patient has been compliant with medications. He is still ambulatory and goes to work daily. In the ED, pt was found to be hypoxic on room air at 89%. IV Lasix 40 BID x1 given in ED. Admitted for Acute on chronic diastolic Heart Failure exacerbation in the setting of recurrent pleural effusion. Cardiology was consulted, recs appreciated: c/w IV Lasix 40 BID, Pulmonary evaluation for thoracocentesis and Farxiga on discharge if renal function stabilizes. TTE Limited: LVEF 50-55%, moderate mitral valve stenosis with thickened and calcified leaflets, moderate pleural effusion. Pulmonary consulted, recs appreciated. pt with multiple hospitalizations since 9/2023 for shortness of breath and recurrent pleural effusion requiring thoracocentesis. Pt with thoracocentesis this admission with 1L drained and aggressive diuresis with Lasix with improvement of symptoms. Pulm recommended for transfer for thoracic surgery for placement of pleurx tube.  Transfer to [facility] initiated.  Pt course complicated by IRINEO on CKD3 with improvement with held Lasix restarted today, also noted RUQ u/s suspicion for acute cholecystitis with cholethiasis, LFTs elevated but bili wnl, surgery was consulted and recommended transfer to tertiary for poss drain as poor surgical candidate given extensive pleural effusion and CHF.  Pt is medically optimized for transfer.       Vital Signs Last 24 Hrs  T(C): 36.2 (01 Jan 2024 13:05), Max: 36.4 (01 Jan 2024 05:15)  T(F): 97.1 (01 Jan 2024 13:05), Max: 97.6 (01 Jan 2024 05:15)  HR: 80 (01 Jan 2024 13:05) (60 - 85)  BP: 136/76 (01 Jan 2024 13:05) (117/69 - 136/76)  BP(mean): --  RR: 16 (01 Jan 2024 13:05) (16 - 16)  SpO2: 94% (01 Jan 2024 13:05) (92% - 98%)  Parameters below as of 01 Jan 2024 13:05  Patient On (Oxygen Delivery Method): nasal cannula  O2 Flow (L/min): 2     Labs:                   9.3    8.81  )-----------( 195      ( 01 Jan 2024 06:30 )             30.2     01-01  145  |  103  |  94<H>  ----------------------------<  106<H>  4.3   |  36<H>  |  1.54<H>  Ca    9.1      01 Jan 2024 06:30  TPro  7.5  /  Alb  2.6<L>  /  TBili  1.1  /  DBili  x   /  AST  316<H>  /  ALT  161<H>  /  AlkPhos  198<H>  01-01        Urinalysis Basic - ( 01 Jan 2024 06:30 )  Color: x / Appearance: x / SG: x / pH: x  Gluc: 106 mg/dL / Ketone: x  / Bili: x / Urobili: x   Blood: x / Protein: x / Nitrite: x   Leuk Esterase: x / RBC: x / WBC x   Sq Epi: x / Non Sq Epi: x / Bacteria: x      PHYSICAL EXAM:  GENERAL: NAD, laying comfortably in bed, awake and alert, NAD, NC in place  HEAD:  Atraumatic, Normocephalic  EYES: EOMI, PERRLA, conjunctiva and sclera clear  ENT: dry mucous membranes  NECK: Supple, JVD + non pulsatile  RESP: non-labored breathing, decreased breath sounds bilaterally, no wheezing, rales or ronchi.   CARDIAC: Regular rate and rhythm. S1 and S2. No murmurs, rubs, or gallops  GI:  Soft, mild tender RUQ, nondistended. Bowel sounds present x4 quadrants; No hepatomegaly. No splenomegaly.  EXTREMITIES:  3+ LE edema, 2+ Peripheral Pulses. Capillary refill <2 seconds. No cyanosis  NERVOUS SYSTEM:  Alert & Oriented X3, no focal deficits  MSK: FROM all 4 extremities, full and equal strength  SKIN: No rashes, bruises along LE             Discharging Provider: Heber Licea, the attending physician, was physically present for the key portions of the evaluation and management (E/M) service provided. The total amount of time spent reviewing the hospital course, laboratory values, imaging findings, assessing/counseling the patient, discussing with consultant physicians, social work, nursing staff was 90 minutes.    Hospital Course:  Per admitting physician: Dr. 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 Patient with recent hospitalization for CHF exacerbation 11/22-11/27 and he left AMA. Patient is a poor historian and unable to give reliable history.  Reports he takes his medications most of the time. He is concerned about blisters on his leg but unable to recognize HF exacerbation being the underlying issue. PCP reports he stopped Lasix and started torsemide 100mg for fluid overload. He is concerned that patient is non compliant with recommendations and medications. Per daughter patient is in sound mind and does not stay in the hospital to complete treatment and signs out AMA. As far as she knows, patient has been compliant with medications. He is still ambulatory and goes to work daily. In the ED, pt was found to be hypoxic on room air at 89%. IV Lasix 40 BID x1 given in ED. Admitted for Acute on chronic diastolic Heart Failure exacerbation in the setting of recurrent pleural effusion. Cardiology was consulted, recs appreciated: c/w IV Lasix 40 BID, Pulmonary evaluation for thoracocentesis and Farxiga on discharge if renal function stabilizes. TTE Limited: LVEF 50-55%, moderate mitral valve stenosis with thickened and calcified leaflets, moderate pleural effusion. Pulmonary consulted, recs appreciated. pt with multiple hospitalizations since 9/2023 for shortness of breath and recurrent pleural effusion requiring thoracocentesis. Pt with thoracocentesis this admission with 1L drained and aggressive diuresis with Lasix with improvement of symptoms. Pulm recommended for transfer for thoracic surgery for placement of pleurx tube.  Transfer to [facility] initiated.  Pt course complicated by IRINEO on CKD3 with improvement with held Lasix restarted today, also noted RUQ u/s suspicion for acute cholecystitis with cholethiasis, LFTs elevated but bili wnl, surgery was consulted and recommended transfer to tertiary for poss drain as poor surgical candidate given extensive pleural effusion and CHF.  Pt is medically optimized for transfer.       Vital Signs Last 24 Hrs  T(C): 36.2 (01 Jan 2024 13:05), Max: 36.4 (01 Jan 2024 05:15)  T(F): 97.1 (01 Jan 2024 13:05), Max: 97.6 (01 Jan 2024 05:15)  HR: 80 (01 Jan 2024 13:05) (60 - 85)  BP: 136/76 (01 Jan 2024 13:05) (117/69 - 136/76)  BP(mean): --  RR: 16 (01 Jan 2024 13:05) (16 - 16)  SpO2: 94% (01 Jan 2024 13:05) (92% - 98%)  Parameters below as of 01 Jan 2024 13:05  Patient On (Oxygen Delivery Method): nasal cannula  O2 Flow (L/min): 2     Labs:                   9.3    8.81  )-----------( 195      ( 01 Jan 2024 06:30 )             30.2     01-01  145  |  103  |  94<H>  ----------------------------<  106<H>  4.3   |  36<H>  |  1.54<H>  Ca    9.1      01 Jan 2024 06:30  TPro  7.5  /  Alb  2.6<L>  /  TBili  1.1  /  DBili  x   /  AST  316<H>  /  ALT  161<H>  /  AlkPhos  198<H>  01-01        Urinalysis Basic - ( 01 Jan 2024 06:30 )  Color: x / Appearance: x / SG: x / pH: x  Gluc: 106 mg/dL / Ketone: x  / Bili: x / Urobili: x   Blood: x / Protein: x / Nitrite: x   Leuk Esterase: x / RBC: x / WBC x   Sq Epi: x / Non Sq Epi: x / Bacteria: x      PHYSICAL EXAM:  GENERAL: NAD, laying comfortably in bed, awake and alert, NAD, NC in place  HEAD:  Atraumatic, Normocephalic  EYES: EOMI, PERRLA, conjunctiva and sclera clear  ENT: dry mucous membranes  NECK: Supple, JVD + non pulsatile  RESP: non-labored breathing, decreased breath sounds bilaterally, no wheezing, rales or ronchi.   CARDIAC: Regular rate and rhythm. S1 and S2. No murmurs, rubs, or gallops  GI:  Soft, mild tender RUQ, nondistended. Bowel sounds present x4 quadrants; No hepatomegaly. No splenomegaly.  EXTREMITIES:  3+ LE edema, 2+ Peripheral Pulses. Capillary refill <2 seconds. No cyanosis  NERVOUS SYSTEM:  Alert & Oriented X3, no focal deficits  MSK: FROM all 4 extremities, full and equal strength  SKIN: No rashes, bruises along LE             Discharging Provider: Heber Licea, the attending physician, was physically present for the key portions of the evaluation and management (E/M) service provided. The total amount of time spent reviewing the hospital course, laboratory values, imaging findings, assessing/counseling the patient, discussing with consultant physicians, social work, nursing staff was 90 minutes.

## 2023-12-23 NOTE — PROGRESS NOTE ADULT - ASSESSMENT
Physical Examination:  GENERAL:               Alert, Oriented, No acute distress.    HEENT:                   No JVD, Moist MM, fresh blood in left nare  PULM:                     Bilateral air entry,  No Rales, No Rhonchi, No Wheezing  CVS:                         S1, S2,  +murmur  ABD:                        Soft, nondistended, nontender, normoactive bowel sounds,   EXT:                         ++edema, nontender, No Cyanosis or Clubbing   NEURO:                  Alert, oriented, interactive, nonfocal, follows commands  PSYC:                      Calm, + Insight.      Assessment  ERV  Dyspnea suspect due to acute on chronic Diastolic CHF  Chronic Left pleural effusions  s/p thoracentesis 11/24/23    Afib on a/c  Cigar smoker, at risk for copd.   Worsening renal function on CKD    Plan  pleural effusion recurrent - appears exudative bloody effusion   Called [facility] 12/23 no prelim available on cytology   patient will need pleuroscopy and ? pleural biopsy if cytology negative.     CXR stable    Diurese as per primary team  monitor on room air  out of bed as tolerated  optimize cardiac meds as per Cardio  Rest of care as per primary team.  Can follow up outpatient for pleural fluid results, will likely need thoracic out patient f/u for any intervention if needed  Discussed with Dr. Gilliland

## 2023-12-23 NOTE — PROGRESS NOTE ADULT - ATTENDING COMMENTS
Intermittent dizziness this am which self-resolved    CXR unchanged from previous. saturating well on room air  +LE edema     Pulm and cardio following. Daughter states she will help him with his home medications and wants to continue eliquis Ear Star Wedge Flap Text: The defect edges were debeveled with a #15 blade scalpel.  Given the location of the defect and the proximity to free margins (helical rim) an ear star wedge flap was deemed most appropriate.  Using a sterile surgical marker, the appropriate flap was drawn incorporating the defect and placing the expected incisions between the helical rim and antihelix where possible.  The area thus outlined was incised through and through with a #15 scalpel blade.

## 2023-12-23 NOTE — PROGRESS NOTE ADULT - SUBJECTIVE AND OBJECTIVE BOX
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 Patient with recent hospitalization for CHF exacerbation 11/22-11/27 and he left AMA. Patient is a poor historian and unable to give reliable history. Denies any chest pain, palpitations, N/V, HA, dizziness. Reports he takes his medications most of the time. He is concerned about blisters on his leg but unable to recognize HF exacerbation being the underlying issue. Also reports constipation but denies N/V/D. Reports lack of appetite but denies dysphagia.    INTERVAL HPI: Patient seen and examined at bedside. Reports experiencing shortness of breath and dizziness.     Vital Signs Last 24 Hrs  T(C): 36.6 (23 Dec 2023 12:33), Max: 36.7 (22 Dec 2023 20:26)  T(F): 97.8 (23 Dec 2023 12:33), Max: 98 (22 Dec 2023 20:26)  HR: 85 (23 Dec 2023 12:33) (78 - 120)  BP: 115/60 (23 Dec 2023 12:33) (110/63 - 120/61)  BP(mean): --  RR: 16 (23 Dec 2023 12:33) (16 - 17)  SpO2: 97% (23 Dec 2023 12:33) (95% - 97%)    Parameters below as of 23 Dec 2023 12:33  Patient On (Oxygen Delivery Method): room air    PHYSICAL EXAM:  Constitutional: Pt lying in bed, awake and alert, NAD  HEENT: EOMI, normal hearing, moist mucous membranes  Neck: Soft and supple, no JVD  Respiratory: CTABL, No wheezing, bibasilar rales noted worse on L  Cardiovascular: S1S2+, RRR, no M/G/R  Gastrointestinal: BS+, soft, NT/ND, no guarding, no rebound  Extremities: 2+ pitting edema b/l LE, LE blisters noted b/l  Vascular: 2+ peripheral pulses  Neurological: AAOx3, no focal deficits      LABS: Personally reviewed                        9.4    9.05  )-----------( 135      ( 22 Dec 2023 07:24 )             29.0   12-22    143  |  102  |  105<H>  ----------------------------<  119<H>  4.5   |  33<H>  |  2.05<H>    Ca    9.1      22 Dec 2023 07:24    TPro  7.5  /  Alb  2.7<L>  /  TBili  0.7  /  DBili  x   /  AST  18  /  ALT  15  /  AlkPhos  62  12-22            Urinalysis Basic - ( 21 Dec 2023 07:56 )    Color: x / Appearance: x / SG: x / pH: x  Gluc: 119 mg/dL / Ketone: x  / Bili: x / Urobili: x   Blood: x / Protein: x / Nitrite: x   Leuk Esterase: x / RBC: x / WBC x   Sq Epi: x / Non Sq Epi: x / Bacteria: x        Culture - Fungal, Body Fluid (collected 19 Dec 2023 13:46)  Source: Pleural Fl Pleural Fluid  Preliminary Report (20 Dec 2023 07:19):    Testing in progress    Culture - Body Fluid with Gram Stain (collected 19 Dec 2023 13:46)  Source: Pleural Fl Pleural Fluid  Gram Stain (20 Dec 2023 02:07):    polymorphonuclear leukocytes seen    No organisms seen    by cytocentrifuge  Preliminary Report (20 Dec 2023 19:27):    No growth    Culture - Fungal, Body Fluid (collected 12-19-23 @ 13:46)  Source: Pleural Fl Pleural Fluid  Preliminary Report (12-20-23 @ 07:19):    Testing in progress    Culture - Body Fluid with Gram Stain (collected 12-19-23 @ 13:46)  Source: Pleural Fl Pleural Fluid  Gram Stain (12-20-23 @ 02:07):    polymorphonuclear leukocytes seen    No organisms seen    by cytocentrifuge  Preliminary Report (12-20-23 @ 19:27):    No growth        RADIOLOGY & ADDITIONAL TESTS: Personally reviewed.       < from: Xray Chest 1 View- PORTABLE-Routine (Xray Chest 1 View- PORTABLE-Routine in AM.) (12.21.23 @ 09:40) >  IMPRESSION:  Similar left pleural effusion.    < from: CT Chest No Cont (12.20.23 @ 11:11) >  IMPRESSION: Moderate left pleural effusion. Band type opacity is   identified within the right lower lobe, likely related to atelectasis.   Opacity within the lingular segment of the left upper lobe and left lower   lobe is consistent with atelectasis; however, underlying parenchymal   infiltrate/consolidation cannot be excluded. Mediastinal lymph nodes   identified measuring up to 2.6 x 2.0 cm.    < from: Xray Chest 1 View- PORTABLE-Routine (Xray Chest 1 View- PORTABLE-Routine .) (12.19.23 @ 15:46) >  IMPRESSION:  1. Slight decrease in left pleural effusion compared to the prior exam   with no pneumothorax on either side.  2. Persistent consolidation in the left lower lobe, consistent with   atelectasis and/or infiltrate, with coexistent smaller left pleural   effusion.  3. Prominent cardiac silhouette with atherosclerosis, but without CHF.    < from: Xray Chest 1 View- PORTABLE-Urgent (12.18.23 @ 11:17) >  IMPRESSION: Increasingleft effusion since previous exam with associated   atelectatic change. Right chest is unchanged. Cardiomegaly.   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 Continued   follow-up suggested

## 2023-12-23 NOTE — PROGRESS NOTE ADULT - ASSESSMENT
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 Need to obtain collateral from family, patient is poor historian  -Home regimen is torsemide 100 daily per PCP Dr. Rubio  - IV Lasix x1 today, Transition to PO lasix  -strict I/os, daily weights, fluid restriction and low salt diet  -monitor on tele  -cardio recs appreciated: If renal function stabilizes then may consider SGLT2-I this admission.    #Pleural effusion, left   -worsening left sided effusion  -s/p thoracentesis with 1 L removal during last hospitalization  -cytopath negative for malignancy. Suspect 2/2 HF exacerbation  -recommend aggressive diuresis, if respiratory distress - may need therapeutic thoracentesis.  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    #COPD   -not in exacerbation  -given COPD, gpq196% on greater acceptable. 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 Need to obtain collateral from family, patient is poor historian  -Home regimen is torsemide 100 daily per PCP Dr. Rubio  - IV Lasix x1 today, Transition to PO lasix  -strict I/os, daily weights, fluid restriction and low salt diet  -monitor on tele  -cardio recs appreciated: If renal function stabilizes then may consider SGLT2-I this admission.    #Pleural effusion, left   -worsening left sided effusion  -s/p thoracentesis with 1 L removal during last hospitalization  -cytopath negative for malignancy. Suspect 2/2 HF exacerbation  -recommend aggressive diuresis, if respiratory distress - may need therapeutic thoracentesis.  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    #COPD   -not in exacerbation  -given COPD, xur193% on greater acceptable. 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 Need to obtain collateral from family, patient is poor historian  -Home regimen is torsemide 100 daily per PCP Dr. Rubio  -Repeat CXR done today with no changes from prior  - IV Lasix x1 today, Transition to PO lasix tomorrow  -strict I/os, daily weights, fluid restriction and low salt diet  -monitor on tele  -cardio recs appreciated: If renal function stabilizes then may consider SGLT2-I this admission.    #Pleural effusion, left   -worsening left sided effusion  -s/p thoracentesis with 1 L removal during last hospitalization  -cytopath negative for malignancy. Suspect 2/2 HF exacerbation  -recommend aggressive diuresis, if respiratory distress - may need therapeutic thoracentesis.  -pulm recs appreciated  -discuss case with cardiothoracic surgery for further recs    #chronic a-fib  -c/w rate control with metoprolol  -holding eliquis per pulm recs as thoracocentesis was bloody    #Stage 3 CKD  -baseline Cr is 1.6  -suspect IRIENO due to cardio-renal syndrome  -diurese and assess for improvement.    #COPD   -not in exacerbation  -given COPD, aiv728% on greater acceptable. 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 Need to obtain collateral from family, patient is poor historian  -Home regimen is torsemide 100 daily per PCP Dr. Rubio  -Repeat CXR done today with no changes from prior  - IV Lasix x1 today, Transition to PO lasix tomorrow  -strict I/os, daily weights, fluid restriction and low salt diet  -monitor on tele  -cardio recs appreciated: If renal function stabilizes then may consider SGLT2-I this admission.    #Pleural effusion, left   -worsening left sided effusion  -s/p thoracentesis with 1 L removal during last hospitalization  -cytopath negative for malignancy. Suspect 2/2 HF exacerbation  -recommend aggressive diuresis, if respiratory distress - may need therapeutic thoracentesis.  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    #COPD   -not in exacerbation  -given COPD, [id number]% on greater acceptable. 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 Need to obtain collateral from family, patient is poor historian  -Home regimen is torsemide 100 daily per PCP Dr. Rubio  -Repeat CXR done today with no changes from prior  - IV Lasix x1 today, Transition to PO lasix tomorrow  -strict I/os, daily weights, fluid restriction and low salt diet  -monitor on tele  -cardio recs appreciated: If renal function stabilizes then may consider SGLT2-I this admission.    #Pleural effusion, left   -worsening left sided effusion  -s/p thoracentesis with 1 L removal during last hospitalization  -cytopath negative for malignancy. Suspect 2/2 HF exacerbation  -recommend aggressive diuresis, if respiratory distress - may need therapeutic thoracentesis.  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    #COPD   -not in exacerbation  -given COPD, imf095% on greater acceptable. 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 Need to obtain collateral from family, patient is poor historian  -Home regimen is torsemide 100 daily per PCP Dr. Rubio  -Repeat CXR done today with no changes from prior  - IV Lasix x1 today, Transition to PO lasix tomorrow  -strict I/os, daily weights, fluid restriction and low salt diet  -monitor on tele  -cardio recs appreciated: If renal function stabilizes then may consider SGLT2-I this admission.    #Pleural effusion, left   -worsening left sided effusion  -s/p thoracentesis with 1 L removal during last hospitalization  -cytopath negative for malignancy. Suspect 2/2 HF exacerbation  -recommend aggressive diuresis, if respiratory distress - may need therapeutic thoracentesis.  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    #COPD   -not in exacerbation  -given COPD, [id number]% on greater acceptable. Avoid hyperoxygenation   -c/w spiriva, Symbicort and albuterol PRN  - pulm recommendations appreciated    #HLD  -cont lipitor    #enterovirus  -supportive care    #HTN  -cont hydralazine  -cont toprol  -cont torsemide    #DVT PPX  -on eliquis    #FULL CODE  -palliative consulted    Case discussed with Dr Gilliland

## 2023-12-23 NOTE — PROGRESS NOTE ADULT - SUBJECTIVE AND OBJECTIVE BOX
Follow up for :   chf af  ms cad    SUBJ:  breathing comfortable, legs swollen still      PMH  Afib    Congestive heart failure (CHF)    CVA (cerebral vascular accident)    Hypertension, unspecified type    Chronic obstructive pulmonary disease (COPD)        MEDICATIONS  (STANDING):  allopurinol 100 milliGRAM(s) Oral daily  apixaban 2.5 milliGRAM(s) Oral every 12 hours  aspirin enteric coated 81 milliGRAM(s) Oral daily  atorvastatin 40 milliGRAM(s) Oral at bedtime  bacitracin   Ointment 1 Application(s) Topical daily  budesonide 160 MICROgram(s)/formoterol 4.5 MICROgram(s) Inhaler 2 Puff(s) Inhalation two times a day  furosemide    Tablet 80 milliGRAM(s) Oral two times a day  hydrALAZINE 25 milliGRAM(s) Oral every 8 hours  melatonin 3 milliGRAM(s) Oral at bedtime  metoprolol succinate ER 50 milliGRAM(s) Oral daily  pantoprazole    Tablet 40 milliGRAM(s) Oral before breakfast  polyethylene glycol 3350 17 Gram(s) Oral two times a day  senna 2 Tablet(s) Oral at bedtime  tiotropium 2.5 MICROgram(s) Inhaler 2 Puff(s) Inhalation daily    MEDICATIONS  (PRN):  acetaminophen     Tablet .. 650 milliGRAM(s) Oral every 6 hours PRN Temp greater or equal to 38C (100.4F), Mild Pain (1 - 3)  albuterol    90 MICROgram(s) HFA Inhaler 2 Puff(s) Inhalation every 6 hours PRN Shortness of Breath and/or Wheezing        PHYSICAL EXAM:  Vital Signs Last 24 Hrs  T(C): 36.6 (23 Dec 2023 12:33), Max: 36.7 (22 Dec 2023 20:26)  T(F): 97.8 (23 Dec 2023 12:33), Max: 98 (22 Dec 2023 20:26)  HR: 85 (23 Dec 2023 12:33) (78 - 120)  BP: 115/60 (23 Dec 2023 12:33) (110/63 - 120/61)  BP(mean): --  RR: 16 (23 Dec 2023 12:33) (16 - 17)  SpO2: 97% (23 Dec 2023 12:33) (95% - 97%)    Parameters below as of 23 Dec 2023 12:33  Patient On (Oxygen Delivery Method): room air        GENERAL: NAD, well-groomed, well-developed  HEAD:  Atraumatic, Normocephalic  EYES:  conjunctiva and sclera clear  ENT: Moist mucous membranes,  NECK: Supple,  + JVD   CHEST/LUNG: decreased bs left scattered rales  HEART: Regular rate and rhythm; No murmurs, rubs, or gallops PMI non displaced.  ABDOMEN: Soft, Nontender,  mildly distended  EXTREMITIES:  2+ edema bilateral to knees  NERVOUS SYSTEM:  Alert   slurred speech      TELEMETRY:  af controlled vr    ECG:  < from: Xray Chest 1 View- PORTABLE-Routine (Xray Chest 1 View- PORTABLE-Routine in AM.) (23 @ 09:40) >    ACC: [id number] EXAM:  XR CHEST PORTABLE ROUTINE 1V   ORDERED BY: MATHIEU MARROQUIN     PROCEDURE DATE:  2023          INTERPRETATION:  Chest one view    HISTORY: Postthoracentesis    COMPARISON STUDY: 2023    Frontal expiratory view of thechest shows the heart to be similar in   size. The lungs show clear right lung with similar moderate left effusion   and there is no evidence of pneumothorax nor right pleural effusion.    IMPRESSION:  Similar left pleural effusion.        Thank you for the courtesy of this referral.    --- End of Report ---        < end of copied text >  < from: 12 Lead ECG (23 @ 09:53) >    Ventricular Rate 101 BPM    Atrial Rate 101 BPM    QRS Duration 88 ms    Q-T Interval 354 ms    QTC Calculation(Bazett) 459 ms    R Axis 11 degrees    T Axis 175 degrees    Diagnosis Line Atrial fibrillation with rapid ventricular response  ST and T wave abnormality, consider lateral ischemia  Abnormal ECG  When compared with ECG of 2023 13:01,  No significant change was found      Confirmed by Derek Hong ([id number]) on 2023 3:40:59 PM    < end of copied text >  < from: TTE Echo Limited or F/U (23 @ 08:05) >    ACC: [id number] EXAM:  ECHO TTE WO CON FU [facility] [address]                          PROCEDURE DATE:  2023          INTERPRETATION:  TRANSTHORACIC ECHOCARDIOGRAM REPORT        Patient Name:   RAD SINGLETON Patient Location: [facility]  Medical Rec #:  [id number]            Accession #:      [id number]  Account #:      [id number]             Height:           66.0 in 167.6 cm  YOB: 1934          Weight:           155.0 lb 70.30 kg  Patient Age:    89 years           BSA:              1.79 m²  Patient Gender: M                  BP:               108/55 mmHg      Date of Exam:        2023 8:05:00 AM  Sonographer:         Michael Beckwith  Referring Physician: BRITTANY MATHIAS    Procedure:     Follow up or Limited Echocardiogram.  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        2D AND M-MODE MEASUREMENTS (normal ranges within parentheses):  Left Ventricle:                  Normal   Aorta/Left Atrium:            Normal  IVSd (2D):              1.34 cm (0.7-1.1) Aortic Root (2D):  3.49 cm   (2.4-3.7)  LVPWd (2D):             1.32 cm (0.7-1.1) Left Atrium (2D):  5.17 cm   (1.9-4.0)  LVIDd (2D):             4.38 cm (3.4-5.7)  LVIDs (2D):        3.04 cm  LV FS (2D):             30.5 %   (>25%)  LV EF (2D):              58 %    (>55%)  Relative Wall Thickness  0.60    (<0.42)    LV SYSTOLIC FUNCTION BY 2D PLANIMETRY (MOD):  EF-A4C View: 60.5 % EF-A2C View: 60.9 % EF-Biplane: 60 %    LV DIASTOLIC FUNCTION:  MV Peak E: 2.02 m/s Decel Time: 646 msec  MV Peak A: 0.49 m/s  E/A Ratio: 4.13    SPECTRAL DOPPLER ANALYSIS (where applicable):  Mitral Valve:  MV Mean Grad: 8.1 mmHg MV P1/2 Time: 187.29 msec                         MV Area, PHT: 1.17 cm²    Aortic Valve: AoV Max Ron: 1.48 m/s AoV Peak P.8 mmHg AoV Mean P.3 mmHg    LVOT Vmax: 0.79 m/s LVOT VTI: 0.175 m LVOT Diameter: 2.27 cm    AoV Area, Vmax: 2.16 cm² AoV Area, VTI: 2.21 cm² AoV Area, Vmn: 2.03 cm²  Ao VTI: 0.322  Aortic Insufficiency:  AI Half-time:  499 msec  AI Decel Rate: 2.02 m/s²    Tricuspid Valve and PA/RV Systolic Pressure: TR Max Velocity: 2.40 m/s RA   Pressure: 15 mmHg RVSP/PASP: 38.0 mmHg      PHYSICIAN INTERPRETATION:  Left Ventricle: The left ventricular internal cavity size is normal. Left   ventricular wall thickness is mildly increased.  Global LV systolic function was normal. Left ventricular ejection   fraction, by visual estimation, is 50 to 55%.  Right Ventricle: The right ventricle is not well visualized, appears   generally normal in size.  Left Atrium: Left atrial enlargement.  Right Atrium: Right atrial enlargement.  Pericardium: There is no evidence of pericardial effusion. There is a   moderate pleural effusion in both leftand right lateral regions.  Mitral Valve: Moderate thickening and calcification of the anterior and   posterior mitral valve leaflets. There is mild mitral annular   calcification. Mild mitral valve regurgitation is seen. Moderate mitral   valve stenosis (mean gradient 8 mmHg at HR 69 BPM, MVA 1.3 cm^2 by PHT   175 msec).  Tricuspid Valve: Mild-moderate tricuspid regurgitation is visualized.   Estimated pulmonary artery systolic pressure is 38.0 mmHg assuming a   right atrial pressure of 15 mmHg,which is consistent with borderline   pulmonary hypertension.  Aortic Valve: The aortic valve is trileaflet. Sclerotic aortic valve with   normal opening. Mild aortic valve regurgitation is seen. Aortic valve   leaflet calcification. No aortic valve stenosis.  Pulmonic Valve: The pulmonic valve was not well visualized. Trace   pulmonic valve regurgitation.  Aorta: Aortic root measured at Sinus of Valsalva is normal.  Venous: The inferior vena cava was dilated, with respiratory size   variation less than 50%.      Summary:   1. The heart rhythm is irregular throughout the study.   2. Normal global left ventricular systolic function.   3. Left ventricular ejection fraction, by visual estimation, is 50 to   55%.   4. Mildly increased LV wall thickness.   5. Normal left ventricular internal cavity size.   6. The right ventricle is not well visualized, appears generally normal   in size.   7. Left atrial enlargement.   8. Right atrial enlargement.   9. Mild mitral annular calcification.  10. Moderate thickening and calcification of the anterior and posterior   mitral valve leaflets.  11. Moderate mitral valve stenosis (mean gradient 8 mmHg at HR 69 BPM,   MVA 1.3 cm^2 by  msec).  12. Mild mitral valve regurgitation.  13. Mild-moderate tricuspid regurgitation.  14. Aortic valve leaflet calcification. No aortic valve stenosis.  15. Sclerotic aortic valve with normal opening.  16. Mild aortic regurgitation.  17. Estimated pulmonary artery systolic pressure is 38.0 mmHg assuming a   right atrial pressure of 15 mmHg, which is consistent with borderline   pulmonary hypertension.  18. Moderate pleural effusion in both left and right lateral regions.  19. There is no evidence of pericardial effusion.    [id number] Kevin Hong MD Electronically signed on   2023 at 12:57:57 PM        *** Final ***    < end of copied text >      LABS:                        9.4    9.05  )-----------( 135      ( 22 Dec 2023 07:24 )             29.0         142  |  102  |  107<H>  ----------------------------<  115<H>  3.8   |  32<H>  |  1.98<H>    Ca    9.2      23 Dec 2023 09:23    TPro  7.7  /  Alb  2.7<L>  /  TBili  0.8  /  DBili  x   /  AST  19  /  ALT  14  /  AlkPhos  67                I&O's Summary    22 Dec 2023 07:01  -  23 Dec 2023 07:00  --------------------------------------------------------  IN: 0 mL / OUT: 900 mL / NET: -900 mL          RADIOLOGY & ADDITIONAL STUDIES:    ECHO:       Follow up for :   chf af  ms cad    SUBJ:  breathing comfortable, legs swollen still      PMH  Afib    Congestive heart failure (CHF)    CVA (cerebral vascular accident)    Hypertension, unspecified type    Chronic obstructive pulmonary disease (COPD)        MEDICATIONS  (STANDING):  allopurinol 100 milliGRAM(s) Oral daily  apixaban 2.5 milliGRAM(s) Oral every 12 hours  aspirin enteric coated 81 milliGRAM(s) Oral daily  atorvastatin 40 milliGRAM(s) Oral at bedtime  bacitracin   Ointment 1 Application(s) Topical daily  budesonide 160 MICROgram(s)/formoterol 4.5 MICROgram(s) Inhaler 2 Puff(s) Inhalation two times a day  furosemide    Tablet 80 milliGRAM(s) Oral two times a day  hydrALAZINE 25 milliGRAM(s) Oral every 8 hours  melatonin 3 milliGRAM(s) Oral at bedtime  metoprolol succinate ER 50 milliGRAM(s) Oral daily  pantoprazole    Tablet 40 milliGRAM(s) Oral before breakfast  polyethylene glycol 3350 17 Gram(s) Oral two times a day  senna 2 Tablet(s) Oral at bedtime  tiotropium 2.5 MICROgram(s) Inhaler 2 Puff(s) Inhalation daily    MEDICATIONS  (PRN):  acetaminophen     Tablet .. 650 milliGRAM(s) Oral every 6 hours PRN Temp greater or equal to 38C (100.4F), Mild Pain (1 - 3)  albuterol    90 MICROgram(s) HFA Inhaler 2 Puff(s) Inhalation every 6 hours PRN Shortness of Breath and/or Wheezing        PHYSICAL EXAM:  Vital Signs Last 24 Hrs  T(C): 36.6 (23 Dec 2023 12:33), Max: 36.7 (22 Dec 2023 20:26)  T(F): 97.8 (23 Dec 2023 12:33), Max: 98 (22 Dec 2023 20:26)  HR: 85 (23 Dec 2023 12:33) (78 - 120)  BP: 115/60 (23 Dec 2023 12:33) (110/63 - 120/61)  BP(mean): --  RR: 16 (23 Dec 2023 12:33) (16 - 17)  SpO2: 97% (23 Dec 2023 12:33) (95% - 97%)    Parameters below as of 23 Dec 2023 12:33  Patient On (Oxygen Delivery Method): room air        GENERAL: NAD, well-groomed, well-developed  HEAD:  Atraumatic, Normocephalic  EYES:  conjunctiva and sclera clear  ENT: Moist mucous membranes,  NECK: Supple,  + JVD   CHEST/LUNG: decreased bs left scattered rales  HEART: Regular rate and rhythm; No murmurs, rubs, or gallops PMI non displaced.  ABDOMEN: Soft, Nontender,  mildly distended  EXTREMITIES:  2+ edema bilateral to knees  NERVOUS SYSTEM:  Alert   slurred speech      TELEMETRY:  af controlled vr    ECG:  < from: Xray Chest 1 View- PORTABLE-Routine (Xray Chest 1 View- PORTABLE-Routine in AM.) (23 @ 09:40) >    ACC: [id number] EXAM:  XR CHEST PORTABLE ROUTINE 1V   ORDERED BY: MATHIEU MARROQUIN     PROCEDURE DATE:  2023          INTERPRETATION:  Chest one view    HISTORY: Postthoracentesis    COMPARISON STUDY: 2023    Frontal expiratory view of thechest shows the heart to be similar in   size. The lungs show clear right lung with similar moderate left effusion   and there is no evidence of pneumothorax nor right pleural effusion.    IMPRESSION:  Similar left pleural effusion.        Thank you for the courtesy of this referral.    --- End of Report ---        < end of copied text >  < from: 12 Lead ECG (23 @ 09:53) >    Ventricular Rate 101 BPM    Atrial Rate 101 BPM    QRS Duration 88 ms    Q-T Interval 354 ms    QTC Calculation(Bazett) 459 ms    R Axis 11 degrees    T Axis 175 degrees    Diagnosis Line Atrial fibrillation with rapid ventricular response  ST and T wave abnormality, consider lateral ischemia  Abnormal ECG  When compared with ECG of 2023 13:01,  No significant change was found      Confirmed by Derek Hong ([id number]) on 2023 3:40:59 PM    < end of copied text >  < from: TTE Echo Limited or F/U (23 @ 08:05) >    ACC: [id number] EXAM:  ECHO TTE WO CON FU [facility] [id number]                          PROCEDURE DATE:  2023          INTERPRETATION:  TRANSTHORACIC ECHOCARDIOGRAM REPORT        Patient Name:   RAD SINGLETON Patient Location: [facility]  Medical Rec #:  [id number]            Accession #:      [id number]  Account #:      [id number]             Height:           66.0 in 167.6 cm  YOB: 1934          Weight:           155.0 lb 70.30 kg  Patient Age:    89 years           BSA:              1.79 m²  Patient Gender: M                  BP:               108/55 mmHg      Date of Exam:        2023 8:05:00 AM  Sonographer:         Michael Beckwith  Referring Physician: BRITTANY MATHIAS    Procedure:     Follow up or Limited Echocardiogram.  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        2D AND M-MODE MEASUREMENTS (normal ranges within parentheses):  Left Ventricle:                  Normal   Aorta/Left Atrium:            Normal  IVSd (2D):              1.34 cm (0.7-1.1) Aortic Root (2D):  3.49 cm   (2.4-3.7)  LVPWd (2D):             1.32 cm (0.7-1.1) Left Atrium (2D):  5.17 cm   (1.9-4.0)  LVIDd (2D):             4.38 cm (3.4-5.7)  LVIDs (2D):        3.04 cm  LV FS (2D):             30.5 %   (>25%)  LV EF (2D):              58 %    (>55%)  Relative Wall Thickness  0.60    (<0.42)    LV SYSTOLIC FUNCTION BY 2D PLANIMETRY (MOD):  EF-A4C View: 60.5 % EF-A2C View: 60.9 % EF-Biplane: 60 %    LV DIASTOLIC FUNCTION:  MV Peak E: 2.02 m/s Decel Time: 646 msec  MV Peak A: 0.49 m/s  E/A Ratio: 4.13    SPECTRAL DOPPLER ANALYSIS (where applicable):  Mitral Valve:  MV Mean Grad: 8.1 mmHg MV P1/2 Time: 187.29 msec                         MV Area, PHT: 1.17 cm²    Aortic Valve: AoV Max Ron: 1.48 m/s AoV Peak P.8 mmHg AoV Mean P.3 mmHg    LVOT Vmax: 0.79 m/s LVOT VTI: 0.175 m LVOT Diameter: 2.27 cm    AoV Area, Vmax: 2.16 cm² AoV Area, VTI: 2.21 cm² AoV Area, Vmn: 2.03 cm²  Ao VTI: 0.322  Aortic Insufficiency:  AI Half-time:  499 msec  AI Decel Rate: 2.02 m/s²    Tricuspid Valve and PA/RV Systolic Pressure: TR Max Velocity: 2.40 m/s RA   Pressure: 15 mmHg RVSP/PASP: 38.0 mmHg      PHYSICIAN INTERPRETATION:  Left Ventricle: The left ventricular internal cavity size is normal. Left   ventricular wall thickness is mildly increased.  Global LV systolic function was normal. Left ventricular ejection   fraction, by visual estimation, is 50 to 55%.  Right Ventricle: The right ventricle is not well visualized, appears   generally normal in size.  Left Atrium: Left atrial enlargement.  Right Atrium: Right atrial enlargement.  Pericardium: There is no evidence of pericardial effusion. There is a   moderate pleural effusion in both leftand right lateral regions.  Mitral Valve: Moderate thickening and calcification of the anterior and   posterior mitral valve leaflets. There is mild mitral annular   calcification. Mild mitral valve regurgitation is seen. Moderate mitral   valve stenosis (mean gradient 8 mmHg at HR 69 BPM, MVA 1.3 cm^2 by PHT   175 msec).  Tricuspid Valve: Mild-moderate tricuspid regurgitation is visualized.   Estimated pulmonary artery systolic pressure is 38.0 mmHg assuming a   right atrial pressure of 15 mmHg,which is consistent with borderline   pulmonary hypertension.  Aortic Valve: The aortic valve is trileaflet. Sclerotic aortic valve with   normal opening. Mild aortic valve regurgitation is seen. Aortic valve   leaflet calcification. No aortic valve stenosis.  Pulmonic Valve: The pulmonic valve was not well visualized. Trace   pulmonic valve regurgitation.  Aorta: Aortic root measured at Sinus of Valsalva is normal.  Venous: The inferior vena cava was dilated, with respiratory size   variation less than 50%.      Summary:   1. The heart rhythm is irregular throughout the study.   2. Normal global left ventricular systolic function.   3. Left ventricular ejection fraction, by visual estimation, is 50 to   55%.   4. Mildly increased LV wall thickness.   5. Normal left ventricular internal cavity size.   6. The right ventricle is not well visualized, appears generally normal   in size.   7. Left atrial enlargement.   8. Right atrial enlargement.   9. Mild mitral annular calcification.  10. Moderate thickening and calcification of the anterior and posterior   mitral valve leaflets.  11. Moderate mitral valve stenosis (mean gradient 8 mmHg at HR 69 BPM,   MVA 1.3 cm^2 by  msec).  12. Mild mitral valve regurgitation.  13. Mild-moderate tricuspid regurgitation.  14. Aortic valve leaflet calcification. No aortic valve stenosis.  15. Sclerotic aortic valve with normal opening.  16. Mild aortic regurgitation.  17. Estimated pulmonary artery systolic pressure is 38.0 mmHg assuming a   right atrial pressure of 15 mmHg, which is consistent with borderline   pulmonary hypertension.  18. Moderate pleural effusion in both left and right lateral regions.  19. There is no evidence of pericardial effusion.    [id number] Kevin Hong MD Electronically signed on   2023 at 12:57:57 PM        *** Final ***    < end of copied text >      LABS:                        9.4    9.05  )-----------( 135      ( 22 Dec 2023 07:24 )             29.0         142  |  102  |  107<H>  ----------------------------<  115<H>  3.8   |  32<H>  |  1.98<H>    Ca    9.2      23 Dec 2023 09:23    TPro  7.7  /  Alb  2.7<L>  /  TBili  0.8  /  DBili  x   /  AST  19  /  ALT  14  /  AlkPhos  67                I&O's Summary    22 Dec 2023 07:01  -  23 Dec 2023 07:00  --------------------------------------------------------  IN: 0 mL / OUT: 900 mL / NET: -900 mL          RADIOLOGY & ADDITIONAL STUDIES:    ECHO:

## 2023-12-23 NOTE — PROGRESS NOTE ADULT - NUTRITIONAL ASSESSMENT
This patient has been assessed with a concern for Malnutrition and has been determined to have a diagnosis/diagnoses of Severe protein-calorie malnutrition.    This patient is being managed with:   Diet Regular-  1000mL Fluid Restriction ([id number])  Low Sodium  Piero(7 Gm Arginine/7 Gm Glut/1.2 Gm HMB     Qty per Day:  2  Supplement Feeding Modality:  Oral  Ensure Plus High Protein Cans or Servings Per Day:  1       Frequency:  Daily  Entered: Dec 19 2023 11:11AM   This patient has been assessed with a concern for Malnutrition and has been determined to have a diagnosis/diagnoses of Severe protein-calorie malnutrition.    This patient is being managed with:   Diet Regular-  1000mL Fluid Restriction ([id number])  Low Sodium  Piero(7 Gm Arginine/7 Gm Glut/1.2 Gm HMB     Qty per Day:  2  Supplement Feeding Modality:  Oral  Ensure Plus High Protein Cans or Servings Per Day:  1       Frequency:  Daily  Entered: Dec 19 2023 11:11AM

## 2023-12-23 NOTE — PROGRESS NOTE ADULT - SUBJECTIVE AND OBJECTIVE BOX
Follow-up Pulmonary Progress Note  Chief Complaint : Urticaria      patient seen and examined  feels well   complaining of LE edema   denies sob, cough  no n/v, h/a, weakness        Allergies :No Known Allergies      PAST MEDICAL & SURGICAL HISTORY:  Afib    Congestive heart failure (CHF)    CVA (cerebral vascular accident)    Hypertension, unspecified type    Chronic obstructive pulmonary disease (COPD)    No significant past surgical history        Medications:  MEDICATIONS  (STANDING):  allopurinol 100 milliGRAM(s) Oral daily  apixaban 2.5 milliGRAM(s) Oral every 12 hours  aspirin enteric coated 81 milliGRAM(s) Oral daily  atorvastatin 40 milliGRAM(s) Oral at bedtime  bacitracin   Ointment 1 Application(s) Topical daily  budesonide 160 MICROgram(s)/formoterol 4.5 MICROgram(s) Inhaler 2 Puff(s) Inhalation two times a day  furosemide    Tablet 80 milliGRAM(s) Oral two times a day  hydrALAZINE 25 milliGRAM(s) Oral every 8 hours  melatonin 3 milliGRAM(s) Oral at bedtime  metoprolol succinate ER 50 milliGRAM(s) Oral daily  pantoprazole    Tablet 40 milliGRAM(s) Oral before breakfast  polyethylene glycol 3350 17 Gram(s) Oral two times a day  senna 2 Tablet(s) Oral at bedtime  tiotropium 2.5 MICROgram(s) Inhaler 2 Puff(s) Inhalation daily    MEDICATIONS  (PRN):  acetaminophen     Tablet .. 650 milliGRAM(s) Oral every 6 hours PRN Temp greater or equal to 38C (100.4F), Mild Pain (1 - 3)  albuterol    90 MICROgram(s) HFA Inhaler 2 Puff(s) Inhalation every 6 hours PRN Shortness of Breath and/or Wheezing      Antibiotics History      Heme Medications   apixaban 2.5 milliGRAM(s) Oral every 12 hours, 12-20-23 @ 14:46  aspirin enteric coated 81 milliGRAM(s) Oral daily, 12-18-23 @ 14:40      GI Medications  pantoprazole    Tablet 40 milliGRAM(s) Oral before breakfast, 12-18-23 @ 14:42, Routine  polyethylene glycol 3350 17 Gram(s) Oral two times a day, 12-19-23 @ 08:53,   senna 2 Tablet(s) Oral at bedtime, 12-18-23 @ 14:17, Routine        LABS:                        9.4    9.05  )-----------( 135      ( 22 Dec 2023 07:24 )             29.0     12-23    142  |  102  |  107<H>  ----------------------------<  115<H>  3.8   |  32<H>  |  1.98<H>    Ca    9.2      23 Dec 2023 09:23    TPro  7.7  /  Alb  2.7<L>  /  TBili  0.8  /  DBili  x   /  AST  19  /  ALT  14  /  AlkPhos  67  12-23    Fluid characteristics  -- 12-19 @ 13:46  pH 7.5    tprot 5.2    Cell count  Appearance Bloody  Fluid type --  BF lymph 33  color Red  eosinophil 3  PMN --  Mesothelial 1  Monocyte 35  Other body cells 0           CULTURES: (if applicable)    Culture - Fungal, Body Fluid (collected 12-19-23 @ 13:46)  Source: Pleural Fl Pleural Fluid  Preliminary Report (12-20-23 @ 07:19):    Testing in progress    Culture - Body Fluid with Gram Stain (collected 12-19-23 @ 13:46)  Source: Pleural Fl Pleural Fluid  Gram Stain (12-20-23 @ 02:07):    polymorphonuclear leukocytes seen    No organisms seen    by cytocentrifuge  Preliminary Report (12-20-23 @ 19:27):    No growth       RADIOLOGY  CXR:     conitinued large left pl effusion      VITALS:  T(C): 36.4 (12-23-23 @ 05:00), Max: 36.7 (12-22-23 @ 20:26)  T(F): 97.6 (12-23-23 @ 05:00), Max: 98 (12-22-23 @ 20:26)  HR: 86 (12-23-23 @ 05:00) (78 - 120)  BP: 114/77 (12-23-23 @ 05:00) (103/60 - 120/61)  BP(mean): --  ABP: --  ABP(mean): --  RR: 17 (12-23-23 @ 05:00) (16 - 17)  SpO2: 95% (12-23-23 @ 05:00) (95% - 95%)  CVP(mm Hg): --  CVP(cm H2O): --    Ins and Outs     12-22-23 @ 07:01  -  12-23-23 @ 07:00  --------------------------------------------------------  IN: 0 mL / OUT: 900 mL / NET: -900 mL                I&O's Detail    22 Dec 2023 07:01  -  23 Dec 2023 07:00  --------------------------------------------------------  IN:  Total IN: 0 mL    OUT:    Voided (mL): 900 mL  Total OUT: 900 mL    Total NET: -900 mL

## 2023-12-24 NOTE — CHART NOTE - NSCHARTNOTEFT_GEN_A_CORE
Assessment:   Patient seen for:  f/u     Source: [x] medical review, [x] patient    Factors impacting intake: [ ] none [ ] nausea  [ ] vomiting [ ] diarrhea [ ] constipation  [ ]chewing problems [ ] swallowing issues  [x] other: lack of appetite    Intake: 51-75%    Diet Prescription: Diet, Regular:   1000mL Fluid Restriction ([id number])  Low Sodium  Piero(7 Gm Arginine/7 Gm Glut/1.2 Gm HMB     Qty per Day:  2  Supplement Feeding Modality:  Oral  Ensure Plus High Protein Cans or Servings Per Day:  1       Frequency:  Daily (12-19-23 @ 11:11)      Current Weight: Weight (kg): 70.3 (12-18 @ 09:52)    Weight Change: 12/23: 156.5 Lbs  12/18: 155 Lbs    Edema: 12/23: carlos leg +2, carlos foot +3, as per nursing flowsheet   Skin: 3 wounds bundled     Pt reports fair appetite. As per nursing flowsheet  PO intake 50-75% + supplements (Piero BID, Ensure Plus HP 8oz QD. Pt feeds self, reports no chewing/swallowing difficulties with current diet. NKFA. Denies N/V/C/D. Last BM: 12/24. Current weight 156.5 Lbs, 12/18 155 Lbs. Edema carlos leg +2, carlos foot +3. Skin with 3 wound bundle. Noted worsening BUN, Cr.         Pertinent Medications: MEDICATIONS  (STANDING):  allopurinol 100 milliGRAM(s) Oral daily  apixaban 2.5 milliGRAM(s) Oral every 12 hours  aspirin enteric coated 81 milliGRAM(s) Oral daily  atorvastatin 40 milliGRAM(s) Oral at bedtime  bacitracin   Ointment 1 Application(s) Topical daily  budesonide 160 MICROgram(s)/formoterol 4.5 MICROgram(s) Inhaler 2 Puff(s) Inhalation two times a day  melatonin 3 milliGRAM(s) Oral at bedtime  metoprolol succinate ER 50 milliGRAM(s) Oral daily  pantoprazole    Tablet 40 milliGRAM(s) Oral before breakfast  polyethylene glycol 3350 17 Gram(s) Oral two times a day  senna 2 Tablet(s) Oral at bedtime  tiotropium 2.5 MICROgram(s) Inhaler 2 Puff(s) Inhalation daily    MEDICATIONS  (PRN):  acetaminophen     Tablet .. 650 milliGRAM(s) Oral every 6 hours PRN Temp greater or equal to 38C (100.4F), Mild Pain (1 - 3)  albuterol    90 MICROgram(s) HFA Inhaler 2 Puff(s) Inhalation every 6 hours PRN Shortness of Breath and/or Wheezing    Pertinent Labs: 12-24 Na142 mmol/L Glu 120 mg/dL<H> K+ 3.6 mmol/L Cr  2.06 mg/dL<H>  mg/dL<H> 12-19 Phos 4.3 mg/dL 12-24 Alb 2.5 g/dL<L>     CAPILLARY BLOOD GLUCOSE        Estimated Needs:   [x] no change since previous assessment  [ ] recalculated:     Previous Nutrition Diagnosis:   [ ] Inadequate Energy Intake   [ ] Inadequate Oral Intake  [ ] Chewing/Swallowing Difficulties   [ ] Excessive Energy Intake   [ ] Underweight   [x] Increased Nutrient Needs   [ ] Overweight/Obesity   [ ] Altered GI Function  [ ] Altered Nutrition Labs  [ ] Unintended Weight Loss   [ ] Food & Nutrition Related Knowledge Deficit   [x] Malnutrition Severe    Nutrition Diagnosis is [x] ongoing  [ ] resolved [ ] not applicable     New Nutrition Diagnosis: [x] not applicable     Interventions:   Recommend  [x] Continue with current diet: Low Sodium FR 1000mL  [x] Nutrition Supplement: Ensure High Protein QD (provide 350 calories, 20 gm protein per 8 oz), Piero 1 pack BID (90 kcal, 7gm glutamine, 7gm arginine/pack)   [x] Honor pt's food preferences as feasible with prescribed diet.  [x] Obtain and record PO intake and weights daily    Monitoring and Evaluation:   Continue to monitor Nutritional intake, Tolerance to diet prescription, weights, labs, skin integrity.  RD remains available upon request and will follow up per protocol. Assessment:   Patient seen for:  f/u     Source: [x] medical review, [x] patient    Factors impacting intake: [ ] none [ ] nausea  [ ] vomiting [ ] diarrhea [ ] constipation  [ ]chewing problems [ ] swallowing issues  [x] other: lack of appetite    Intake: 51-75%    Diet Prescription: Diet, Regular:   1000mL Fluid Restriction ([id number])  Low Sodium  Piero(7 Gm Arginine/7 Gm Glut/1.2 Gm HMB     Qty per Day:  2  Supplement Feeding Modality:  Oral  Ensure Plus High Protein Cans or Servings Per Day:  1       Frequency:  Daily (12-19-23 @ 11:11)      Current Weight: Weight (kg): 70.3 (12-18 @ 09:52)    Weight Change: 12/23: 156.5 Lbs  12/18: 155 Lbs    Edema: 12/23: carlos leg +2, carlos foot +3, as per nursing flowsheet   Skin: 3 wounds bundled     Pt reports fair appetite. As per nursing flowsheet  PO intake 50-75% + supplements (Piero BID, Ensure Plus HP 8oz QD. Pt feeds self, reports no chewing/swallowing difficulties with current diet. NKFA. Denies N/V/C/D. Last BM: 12/24. Current weight 156.5 Lbs, 12/18 155 Lbs. Edema carlos leg +2, carlos foot +3. Skin with 3 wound bundle. Noted worsening BUN, Cr.         Pertinent Medications: MEDICATIONS  (STANDING):  allopurinol 100 milliGRAM(s) Oral daily  apixaban 2.5 milliGRAM(s) Oral every 12 hours  aspirin enteric coated 81 milliGRAM(s) Oral daily  atorvastatin 40 milliGRAM(s) Oral at bedtime  bacitracin   Ointment 1 Application(s) Topical daily  budesonide 160 MICROgram(s)/formoterol 4.5 MICROgram(s) Inhaler 2 Puff(s) Inhalation two times a day  melatonin 3 milliGRAM(s) Oral at bedtime  metoprolol succinate ER 50 milliGRAM(s) Oral daily  pantoprazole    Tablet 40 milliGRAM(s) Oral before breakfast  polyethylene glycol 3350 17 Gram(s) Oral two times a day  senna 2 Tablet(s) Oral at bedtime  tiotropium 2.5 MICROgram(s) Inhaler 2 Puff(s) Inhalation daily    MEDICATIONS  (PRN):  acetaminophen     Tablet .. 650 milliGRAM(s) Oral every 6 hours PRN Temp greater or equal to 38C (100.4F), Mild Pain (1 - 3)  albuterol    90 MICROgram(s) HFA Inhaler 2 Puff(s) Inhalation every 6 hours PRN Shortness of Breath and/or Wheezing    Pertinent Labs: 12-24 Na142 mmol/L Glu 120 mg/dL<H> K+ 3.6 mmol/L Cr  2.06 mg/dL<H>  mg/dL<H> 12-19 Phos 4.3 mg/dL 12-24 Alb 2.5 g/dL<L>     CAPILLARY BLOOD GLUCOSE        Estimated Needs:   [x] no change since previous assessment  [ ] recalculated:     Previous Nutrition Diagnosis:   [ ] Inadequate Energy Intake   [ ] Inadequate Oral Intake  [ ] Chewing/Swallowing Difficulties   [ ] Excessive Energy Intake   [ ] Underweight   [x] Increased Nutrient Needs   [ ] Overweight/Obesity   [ ] Altered GI Function  [ ] Altered Nutrition Labs  [ ] Unintended Weight Loss   [ ] Food & Nutrition Related Knowledge Deficit   [x] Malnutrition Severe    Nutrition Diagnosis is [x] ongoing  [ ] resolved [ ] not applicable     New Nutrition Diagnosis: [x] not applicable     Interventions:   Recommend  [x] Continue with current diet: Low Sodium FR 1000mL  [x] Nutrition Supplement: Ensure High Protein QD (provide 350 calories, 20 gm protein per 8 oz), Piero 1 pack BID (90 kcal, 7gm glutamine, 7gm arginine/pack)   [x] Honor pt's food preferences as feasible with prescribed diet.  [x] Obtain and record PO intake and weights daily    Monitoring and Evaluation:   Continue to monitor Nutritional intake, Tolerance to diet prescription, weights, labs, skin integrity.  RD remains available upon request and will follow up per protocol.

## 2023-12-24 NOTE — PROGRESS NOTE ADULT - ASSESSMENT
Agree with below, should use lotrimin 2 times a day for 2 weeks.   Physical Examination:  GENERAL:               Alert, Oriented, No acute distress.    HEENT:                   No JVD, Moist MM, fresh blood in left nare  PULM:                     Bilateral air entry diminished on left,  No Rales, No Rhonchi, No Wheezing  CVS:                         S1, S2,  +murmur  ABD:                        Soft, nondistended, nontender, normoactive bowel sounds,   EXT:                         ++edema, nontender, No Cyanosis or Clubbing   NEURO:                  Alert, oriented, interactive, nonfocal, follows commands  PSYC:                      Calm, + Insight.      Assessment  ERV  Dyspnea suspect due to acute on chronic Diastolic CHF and Chronic Left pleural effusions  s/p thoracentesis 11/24/23 and 12/19/2023    Afib on a/c  Cigar smoker, at risk for copd.   Worsening renal function on CKD    Plan  pleural effusion recurrent - appears exudative and  bloody effusion     need to f/u cyto, patient will need pleuroscopy and ? pleural biopsy if cytology negative.    out patient f/u with thoracic surgery may be indicated on discharge  CXR stable  left effusion  noted worsening renal function - hold diuretics  monitor on room air  out of bed as tolerated  optimize cardiac meds as per Cardio  Rest of care as per primary team.  Can follow up outpatient for pleural fluid results, will likely need thoracic out patient f/u for any intervention if needed  Discussed with Dr. Gilliland

## 2023-12-24 NOTE — PROGRESS NOTE ADULT - NUTRITIONAL ASSESSMENT
This patient has been assessed with a concern for Malnutrition and has been determined to have a diagnosis/diagnoses of Severe protein-calorie malnutrition.    This patient is being managed with:   Diet Regular-  1000mL Fluid Restriction ([id number])  Low Sodium  Piero(7 Gm Arginine/7 Gm Glut/1.2 Gm HMB     Qty per Day:  2  Supplement Feeding Modality:  Oral  Ensure Plus High Protein Cans or Servings Per Day:  1       Frequency:  Daily  Entered: Dec 19 2023 11:11AM   This patient has been assessed with a concern for Malnutrition and has been determined to have a diagnosis/diagnoses of Severe protein-calorie malnutrition.    This patient is being managed with:   Diet Regular-  1000mL Fluid Restriction ([id number])  Low Sodium  Piero(7 Gm Arginine/7 Gm Glut/1.2 Gm HMB     Qty per Day:  2  Supplement Feeding Modality:  Oral  Ensure Plus High Protein Cans or Servings Per Day:  1       Frequency:  Daily  Entered: Dec 19 2023 11:11AM

## 2023-12-24 NOTE — PROGRESS NOTE ADULT - SUBJECTIVE AND OBJECTIVE BOX
Follow up for :   chf af    SUBJ:  no sob less edema    PMH  Afib    Congestive heart failure (CHF)    CVA (cerebral vascular accident)    Hypertension, unspecified type    Chronic obstructive pulmonary disease (COPD)        MEDICATIONS  (STANDING):  allopurinol 100 milliGRAM(s) Oral daily  apixaban 2.5 milliGRAM(s) Oral every 12 hours  aspirin enteric coated 81 milliGRAM(s) Oral daily  atorvastatin 40 milliGRAM(s) Oral at bedtime  bacitracin   Ointment 1 Application(s) Topical daily  budesonide 160 MICROgram(s)/formoterol 4.5 MICROgram(s) Inhaler 2 Puff(s) Inhalation two times a day  melatonin 3 milliGRAM(s) Oral at bedtime  metoprolol succinate ER 50 milliGRAM(s) Oral daily  pantoprazole    Tablet 40 milliGRAM(s) Oral before breakfast  polyethylene glycol 3350 17 Gram(s) Oral two times a day  senna 2 Tablet(s) Oral at bedtime  tiotropium 2.5 MICROgram(s) Inhaler 2 Puff(s) Inhalation daily    MEDICATIONS  (PRN):  acetaminophen     Tablet .. 650 milliGRAM(s) Oral every 6 hours PRN Temp greater or equal to 38C (100.4F), Mild Pain (1 - 3)  albuterol    90 MICROgram(s) HFA Inhaler 2 Puff(s) Inhalation every 6 hours PRN Shortness of Breath and/or Wheezing        PHYSICAL EXAM:  Vital Signs Last 24 Hrs  T(C): 36.7 (24 Dec 2023 04:48), Max: 36.7 (24 Dec 2023 04:48)  T(F): 98 (24 Dec 2023 04:48), Max: 98 (24 Dec 2023 04:48)  HR: 81 (24 Dec 2023 04:48) (81 - 93)  BP: 138/63 (24 Dec 2023 04:48) (115/60 - 138/63)  BP(mean): 88 (24 Dec 2023 04:48) (88 - 88)  RR: 16 (24 Dec 2023 04:48) (16 - 16)  SpO2: 99% (24 Dec 2023 04:48) (97% - 99%)    Parameters below as of 24 Dec 2023 04:48  Patient On (Oxygen Delivery Method): nasal cannula  O2 Flow (L/min): 2      GENERAL: NAD, well-groomed, well-developed  HEAD:  Atraumatic, Normocephalic  EYES:  conjunctiva and sclera clear  ENT: Moist mucous membranes,  NECK: mild to moderate JVD  CHEST/LUNG: few rales decreased bs  HEART:  No murmurs, rubs, or gallops PMI non displaced.  ABDOMEN: Soft, Nontender, Nondistended; Bowel sounds present  EXTREMITIES:  1-2+ edema  NERVOUS SYSTEM:  Alert       TELEMETRY:  af controlled vr      LABS:    12-24    142  |  100  |  110<H>  ----------------------------<  120<H>  3.6   |  35<H>  |  2.06<H>    Ca    9.0      24 Dec 2023 06:09    TPro  7.3  /  Alb  2.5<L>  /  TBili  0.6  /  DBili  x   /  AST  20  /  ALT  16  /  AlkPhos  60  12-24        I&O's Summary    23 Dec 2023 07:01  -  24 Dec 2023 07:00  --------------------------------------------------------  IN: 0 mL / OUT: 300 mL / NET: -300 mL          RADIOLOGY & ADDITIONAL STUDIES:    ECHO:

## 2023-12-24 NOTE — PROGRESS NOTE ADULT - SUBJECTIVE AND OBJECTIVE BOX
Follow-up Pulmonary Progress Note  Chief Complaint : Urticaria      patient seen and examined  comfortable on room air  complain of LE edema  states wants to go home and feels well enough to be at home        Allergies :No Known Allergies      PAST MEDICAL & SURGICAL HISTORY:  Afib    Congestive heart failure (CHF)    CVA (cerebral vascular accident)    Hypertension, unspecified type    Chronic obstructive pulmonary disease (COPD)    No significant past surgical history        Medications:  MEDICATIONS  (STANDING):  allopurinol 100 milliGRAM(s) Oral daily  apixaban 2.5 milliGRAM(s) Oral every 12 hours  aspirin enteric coated 81 milliGRAM(s) Oral daily  atorvastatin 40 milliGRAM(s) Oral at bedtime  bacitracin   Ointment 1 Application(s) Topical daily  budesonide 160 MICROgram(s)/formoterol 4.5 MICROgram(s) Inhaler 2 Puff(s) Inhalation two times a day  melatonin 3 milliGRAM(s) Oral at bedtime  metoprolol succinate ER 50 milliGRAM(s) Oral daily  pantoprazole    Tablet 40 milliGRAM(s) Oral before breakfast  polyethylene glycol 3350 17 Gram(s) Oral two times a day  senna 2 Tablet(s) Oral at bedtime  tiotropium 2.5 MICROgram(s) Inhaler 2 Puff(s) Inhalation daily    MEDICATIONS  (PRN):  acetaminophen     Tablet .. 650 milliGRAM(s) Oral every 6 hours PRN Temp greater or equal to 38C (100.4F), Mild Pain (1 - 3)  albuterol    90 MICROgram(s) HFA Inhaler 2 Puff(s) Inhalation every 6 hours PRN Shortness of Breath and/or Wheezing      Antibiotics History      Heme Medications   apixaban 2.5 milliGRAM(s) Oral every 12 hours, 12-20-23 @ 14:46  aspirin enteric coated 81 milliGRAM(s) Oral daily, 12-18-23 @ 14:40      GI Medications  pantoprazole    Tablet 40 milliGRAM(s) Oral before breakfast, 12-18-23 @ 14:42, Routine  polyethylene glycol 3350 17 Gram(s) Oral two times a day, 12-19-23 @ 08:53,   senna 2 Tablet(s) Oral at bedtime, 12-18-23 @ 14:17, Routine        LABS:    12-24    142  |  100  |  110<H>  ----------------------------<  120<H>  3.6   |  35<H>  |  2.06<H>    Ca    9.0      24 Dec 2023 06:09    TPro  7.3  /  Alb  2.5<L>  /  TBili  0.6  /  DBili  x   /  AST  20  /  ALT  16  /  AlkPhos  60  12-24    Fluid characteristics  -- 12-19 @ 13:46  pH 7.5    tprot 5.2    Cell count  Appearance Bloody  Fluid type --  BF lymph 33  color Red  eosinophil 3  PMN --  Mesothelial 1  Monocyte 35  Other body cells 0    Trend Bun/Cr  12-24-23 @ 06:09  BUN/CR -  110<H> / 2.06<H>  12-23-23 @ 09:23  BUN/CR -  107<H> / 1.98<H>  12-22-23 @ 07:24  BUN/CR -  105<H> / 2.05<H>  12-21-23 @ 07:56  BUN/CR -  93<H> / 2.03<H>  12-20-23 @ 05:57  BUN/CR -  83<H> / 2.09<H>  12-19-23 @ 06:09  BUN/CR -  88<H> / 1.92<H>  12-18-23 @ 10:15  BUN/CR -  85<H> / 1.90<H>  11-27-23 @ 06:42  BUN/CR -  50<H> / 1.73<H>  11-26-23 @ 07:00  BUN/CR -  50<H> / 1.65<H>  11-25-23 @ 05:45  BUN/CR -  52<H> / 1.71<H>  11-24-23 @ 06:55  BUN/CR -  56<H> / 1.67<H>  11-23-23 @ 07:03  BUN/CR -  53<H> / 1.66<H>          Urinalysis Basic - ( 24 Dec 2023 06:09 )    Color: x / Appearance: x / SG: x / pH: x  Gluc: 120 mg/dL / Ketone: x  / Bili: x / Urobili: x   Blood: x / Protein: x / Nitrite: x   Leuk Esterase: x / RBC: x / WBC x   Sq Epi: x / Non Sq Epi: x / Bacteria: x         CULTURES: (if applicable)    Culture - Fungal, Body Fluid (collected 12-19-23 @ 13:46)  Source: Pleural Fl Pleural Fluid  Preliminary Report (12-23-23 @ 15:03):    Culture is being performed. Fungal cultures are held for 4 weeks.    Culture - Body Fluid with Gram Stain (collected 12-19-23 @ 13:46)  Source: Pleural Fl Pleural Fluid  Gram Stain (12-20-23 @ 02:07):    polymorphonuclear leukocytes seen    No organisms seen    by cytocentrifuge  Preliminary Report (12-20-23 @ 19:27):    No growth      Rapid RVP Result: Detected (12-18-23 @ 10:15)         VITALS:  T(C): 36.7 (12-24-23 @ 04:48), Max: 36.7 (12-24-23 @ 04:48)  T(F): 98 (12-24-23 @ 04:48), Max: 98 (12-24-23 @ 04:48)  HR: 81 (12-24-23 @ 04:48) (81 - 93)  BP: 138/63 (12-24-23 @ 04:48) (115/60 - 138/63)  BP(mean): 88 (12-24-23 @ 04:48) (88 - 88)  ABP: --  ABP(mean): --  RR: 16 (12-24-23 @ 04:48) (16 - 16)  SpO2: 99% (12-24-23 @ 04:48) (97% - 99%)  CVP(mm Hg): --  CVP(cm H2O): --    Ins and Outs     12-23-23 @ 07:01  -  12-24-23 @ 07:00  --------------------------------------------------------  IN: 0 mL / OUT: 300 mL / NET: -300 mL                I&O's Detail    23 Dec 2023 07:01  -  24 Dec 2023 07:00  --------------------------------------------------------  IN:  Total IN: 0 mL    OUT:    Voided (mL): 300 mL  Total OUT: 300 mL    Total NET: -300 mL

## 2023-12-24 NOTE — PROGRESS NOTE ADULT - ATTENDING COMMENTS
Cr uptrending     +LE edema   saturating well on room air    will discuss safe dc planning with daughter, patient still hypervolemic. cautious diuresis while monitoring renal function

## 2023-12-24 NOTE — PROGRESS NOTE ADULT - SUBJECTIVE AND OBJECTIVE BOX
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 Patient with recent hospitalization for CHF exacerbation 11/22-11/27 and he left AMA. Patient is a poor historian and unable to give reliable history. Denies any chest pain, palpitations, N/V, HA, dizziness. Reports he takes his medications most of the time. He is concerned about blisters on his leg but unable to recognize HF exacerbation being the underlying issue. Also reports constipation but denies N/V/D. Reports lack of appetite but denies dysphagia.    INTERVAL HPI: Patient seen and examined at bedside. Pt reports feeling better with NC in place, but BLE still edematous. No overnight events.    Vital Signs Last 24 Hrs  T(C): 36.7 (24 Dec 2023 04:48), Max: 36.7 (24 Dec 2023 04:48)  T(F): 98 (24 Dec 2023 04:48), Max: 98 (24 Dec 2023 04:48)  HR: 81 (24 Dec 2023 04:48) (81 - 93)  BP: 138/63 (24 Dec 2023 04:48) (115/60 - 138/63)  BP(mean): 88 (24 Dec 2023 04:48) (88 - 88)  RR: 16 (24 Dec 2023 04:48) (16 - 16)  SpO2: 99% (24 Dec 2023 04:48) (97% - 99%)    Parameters below as of 24 Dec 2023 04:48  Patient On (Oxygen Delivery Method): nasal cannula  O2 Flow (L/min): 2      PHYSICAL EXAM:  Constitutional: Pt sitting in chair, awake and alert, NAD  HEENT: EOMI, normal hearing, moist mucous membranes  Respiratory: CTABL, No wheezing, bibasilar rales noted worse on L  Cardiovascular: S1S2+, RRR, no M/G/R  Gastrointestinal: BS+, soft, NT/ND, no guarding, no rebound  Extremities: 2+ pitting edema b/l LE, LE blisters noted b/l ACE wrapped  Vascular: 2+ peripheral pulses  Neurological: AAOx3, no focal deficits      LABS: Personally reviewed               12-24    142  |  100  |  110<H>  ----------------------------<  120<H>  3.6   |  35<H>  |  2.06<H>    Ca    9.0      24 Dec 2023 06:09    TPro  7.3  /  Alb  2.5<L>  /  TBili  0.6  /  DBili  x   /  AST  20  /  ALT  16  /  AlkPhos  60  12-24    LIVER FUNCTIONS - ( 24 Dec 2023 06:09 )  Alb: 2.5 g/dL / Pro: 7.3 g/dL / ALK PHOS: 60 U/L / ALT: 16 U/L / AST: 20 U/L / GGT: x                  Culture - Fungal, Body Fluid (collected 19 Dec 2023 13:46)  Source: Pleural Fl Pleural Fluid  Preliminary Report (20 Dec 2023 07:19):    Testing in progress    Culture - Body Fluid with Gram Stain (collected 19 Dec 2023 13:46)  Source: Pleural Fl Pleural Fluid  Gram Stain (20 Dec 2023 02:07):    polymorphonuclear leukocytes seen    No organisms seen    by cytocentrifuge  Preliminary Report (20 Dec 2023 19:27):    No growth    Culture - Fungal, Body Fluid (collected 12-19-23 @ 13:46)  Source: Pleural Fl Pleural Fluid  Preliminary Report (12-20-23 @ 07:19):    Testing in progress    Culture - Body Fluid with Gram Stain (collected 12-19-23 @ 13:46)  Source: Pleural Fl Pleural Fluid  Gram Stain (12-20-23 @ 02:07):    polymorphonuclear leukocytes seen    No organisms seen    by cytocentrifuge  Preliminary Report (12-20-23 @ 19:27):    No growth        RADIOLOGY & ADDITIONAL TESTS: Personally reviewed.       < from: Xray Chest 1 View- PORTABLE-Routine (Xray Chest 1 View- PORTABLE-Routine in AM.) (12.21.23 @ 09:40) >  IMPRESSION:  Similar left pleural effusion.    < from: CT Chest No Cont (12.20.23 @ 11:11) >  IMPRESSION: Moderate left pleural effusion. Band type opacity is   identified within the right lower lobe, likely related to atelectasis.   Opacity within the lingular segment of the left upper lobe and left lower   lobe is consistent with atelectasis; however, underlying parenchymal   infiltrate/consolidation cannot be excluded. Mediastinal lymph nodes   identified measuring up to 2.6 x 2.0 cm.    < from: Xray Chest 1 View- PORTABLE-Routine (Xray Chest 1 View- PORTABLE-Routine .) (12.19.23 @ 15:46) >  IMPRESSION:  1. Slight decrease in left pleural effusion compared to the prior exam   with no pneumothorax on either side.  2. Persistent consolidation in the left lower lobe, consistent with   atelectasis and/or infiltrate, with coexistent smaller left pleural   effusion.  3. Prominent cardiac silhouette with atherosclerosis, but without CHF.    < from: Xray Chest 1 View- PORTABLE-Urgent (12.18.23 @ 11:17) >  IMPRESSION: Increasingleft effusion since previous exam with associated   atelectatic change. Right chest is unchanged. Cardiomegaly.   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 Continued   follow-up suggested

## 2023-12-24 NOTE — PROGRESS NOTE ADULT - ASSESSMENT
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 Need to obtain collateral from family, patient is poor historian  -Home regimen is torsemide 100 daily per PCP Dr. Rubio  -Repeat CXR done today with no changes from prior  -c/w IV lasix, will monitor renal fx   -strict I/os, daily weights, fluid restriction and low salt diet  -monitor on tele  -cardio recs appreciated: If renal function stabilizes then may consider SGLT2-I this admission.    #Pleural effusion, left   -s/p thoracentesis with 1 L removal during last hospitalization  -cytopath negative for malignancy. Suspect 2/2 HF exacerbation  -recommend aggressive diuresis, if respiratory distress - may need therapeutic thoracentesis.  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    #COPD   -not in exacerbation  -given COPD, [id number]% on greater acceptable. 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 Need to obtain collateral from family, patient is poor historian  -Home regimen is torsemide 100 daily per PCP Dr. Rubio  -Repeat CXR done today with no changes from prior  -c/w IV lasix, will monitor renal fx   -strict I/os, daily weights, fluid restriction and low salt diet  -monitor on tele  -cardio recs appreciated: If renal function stabilizes then may consider SGLT2-I this admission.    #Pleural effusion, left   -s/p thoracentesis with 1 L removal during last hospitalization  -cytopath negative for malignancy. Suspect 2/2 HF exacerbation  -recommend aggressive diuresis, if respiratory distress - may need therapeutic thoracentesis.  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    #COPD   -not in exacerbation  -given COPD, fsz479% on greater acceptable. 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 Need to obtain collateral from family, patient is poor historian  -Home regimen is torsemide 100 daily per PCP Dr. Rubio  -Repeat CXR done today with no changes from prior  -c/w IV lasix, will monitor renal fx   -strict I/os, daily weights, fluid restriction and low salt diet  -monitor on tele  -cardio recs appreciated: If renal function stabilizes then may consider SGLT2-I this admission.    #Pleural effusion, left   -s/p thoracentesis with 1 L removal during last hospitalization  -cytopath negative for malignancy. Suspect 2/2 HF exacerbation  -recommend aggressive diuresis, if respiratory distress - may need therapeutic thoracentesis.  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    #COPD   -not in exacerbation  -target SpO2 88-92%. Avoid hyperoxygenation   -c/w spiriva, Symbicort and albuterol PRN  - pulm recommendations appreciated    #HLD  -cont lipitor    #enterovirus  -supportive care    #HTN  -cont hydralazine  -cont toprol  -cont torsemide    #DVT PPX  -on eliquis    #FULL CODE  -palliative consulted    Case discussed with Dr Gilliland

## 2023-12-24 NOTE — PROGRESS NOTE ADULT - ASSESSMENT
Slowly improving volume overload/chf  ashd  af  renal failure      suggest  d/w patient and hospitalist staff  continue diuresis  monitor lytes/bun cr  may need to decrease diuretic if renal parameters continue to rise further  d/w patient

## 2023-12-25 NOTE — PROGRESS NOTE ADULT - ASSESSMENT
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 Need to obtain collateral from family, patient is poor historian  -Home regimen is torsemide 100 daily per PCP Dr. Rubio  -Repeat CXR done today with no changes from prior  -will give 1 dose IV lasix, switch to PO lasix    -strict I/os, daily weights, fluid restriction and low salt diet  -monitor on tele  -cardio recs appreciated: If renal function stabilizes then may consider SGLT2-I this admission.    #Pleural effusion, left   -s/p thoracentesis with 1 L removal during last hospitalization  -cytopath negative for malignancy. Suspect 2/2 HF exacerbation  -recommend aggressive diuresis, if respiratory distress - may need therapeutic thoracentesis.  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    #COPD   -not in exacerbation  -target SpO2 88-92%. Avoid hyperoxygenation   -c/w spiriva, Symbicort and albuterol PRN  - pulm recommendations appreciated    #HLD  -cont lipitor    #enterovirus  -supportive care    #HTN  -cont hydralazine  -cont toprol  -cont torsemide    #DVT PPX  -on eliquis    #FULL CODE  -palliative consulted    Case discussed with Dr Gilliland

## 2023-12-25 NOTE — PROGRESS NOTE ADULT - SUBJECTIVE AND OBJECTIVE BOX
Follow up for af/chf  SUBJ: patient appears comfortable  PMH  Afib    Congestive heart failure (CHF)    CVA (cerebral vascular accident)    Hypertension, unspecified type    Chronic obstructive pulmonary disease (COPD)        MEDICATIONS  (STANDING):  allopurinol 100 milliGRAM(s) Oral daily  apixaban 2.5 milliGRAM(s) Oral every 12 hours  aspirin enteric coated 81 milliGRAM(s) Oral daily  atorvastatin 40 milliGRAM(s) Oral at bedtime  bacitracin   Ointment 1 Application(s) Topical daily  budesonide 160 MICROgram(s)/formoterol 4.5 MICROgram(s) Inhaler 2 Puff(s) Inhalation two times a day  melatonin 3 milliGRAM(s) Oral at bedtime  metoprolol succinate ER 50 milliGRAM(s) Oral daily  pantoprazole    Tablet 40 milliGRAM(s) Oral before breakfast  polyethylene glycol 3350 17 Gram(s) Oral two times a day  senna 2 Tablet(s) Oral at bedtime  tiotropium 2.5 MICROgram(s) Inhaler 2 Puff(s) Inhalation daily    MEDICATIONS  (PRN):  acetaminophen     Tablet .. 650 milliGRAM(s) Oral every 6 hours PRN Temp greater or equal to 38C (100.4F), Mild Pain (1 - 3)  albuterol    90 MICROgram(s) HFA Inhaler 2 Puff(s) Inhalation every 6 hours PRN Shortness of Breath and/or Wheezing        PHYSICAL EXAM:  Vital Signs Last 24 Hrs  T(C): 36.3 (25 Dec 2023 13:01), Max: 36.6 (24 Dec 2023 19:30)  T(F): 97.3 (25 Dec 2023 13:01), Max: 97.9 (24 Dec 2023 19:30)  HR: 90 (25 Dec 2023 13:01) (64 - 90)  BP: 127/53 (25 Dec 2023 13:01) (117/64 - 129/68)  BP(mean): 88 (25 Dec 2023 05:04) (88 - 88)  RR: 18 (25 Dec 2023 13:01) (17 - 18)  SpO2: 94% (25 Dec 2023 13:01) (94% - 98%)    Parameters below as of 25 Dec 2023 13:01  Patient On (Oxygen Delivery Method): room air        GENERAL: NAD, well-groomed, well-developed  HEAD:  Atraumatic, Normocephalic  EYES: EOMI, PERRLA, conjunctiva and sclera clear  ENT: Moist mucous membranes,  NECK: Supple, No JVD, no bruits  CHEST/LUNG: markedly decrease bs bilaterally  HEART: Regular rate and rhythm; No murmurs, rubs, or gallops PMI non displaced.  ABDOMEN: Soft, Nontender, Nondistended; Bowel sounds present  EXTREMITIES: bilateral edema  SKIN: No rashes or lesions  NERVOUS SYSTEM:  Alert & Oriented X3, Good concentration; Motor Strength 5/5 B/L upper and lower extremities; DTRs 2+ intact and symmetric      TELEMETRY:af moderate response    ECG:    LABS:                        9.4    7.86  )-----------( 156      ( 25 Dec 2023 05:15 )             29.0     12-25    143  |  102  |  111<H>  ----------------------------<  106<H>  3.9   |  35<H>  |  1.89<H>    Ca    9.2      25 Dec 2023 05:15  Mg     2.7     12-25    TPro  7.6  /  Alb  2.6<L>  /  TBili  0.6  /  DBili  x   /  AST  20  /  ALT  16  /  AlkPhos  62  12-25            I&O's Summary    24 Dec 2023 07:01  -  25 Dec 2023 07:00  --------------------------------------------------------  IN: 0 mL / OUT: 200 mL / NET: -200 mL      BNP    RADIOLOGY & ADDITIONAL STUDIES:cxr-12/23 large left pleural effusion

## 2023-12-25 NOTE — PROGRESS NOTE ADULT - NUTRITIONAL ASSESSMENT
This patient has been assessed with a concern for Malnutrition and has been determined to have a diagnosis/diagnoses of Severe protein-calorie malnutrition.    This patient is being managed with:   Diet Regular-  1000mL Fluid Restriction ([id number])  Low Sodium  Piero(7 Gm Arginine/7 Gm Glut/1.2 Gm HMB     Qty per Day:  2  Supplement Feeding Modality:  Oral  Ensure Plus High Protein Cans or Servings Per Day:  1       Frequency:  Daily  Entered: Dec 19 2023 11:11AM   This patient has been assessed with a concern for Malnutrition and has been determined to have a diagnosis/diagnoses of Severe protein-calorie malnutrition.    This patient is being managed with:   Diet Regular-  1000mL Fluid Restriction ([id number])  Low Sodium  Piero(7 Gm Arginine/7 Gm Glut/1.2 Gm HMB     Qty per Day:  2  Supplement Feeding Modality:  Oral  Ensure Plus High Protein Cans or Servings Per Day:  1       Frequency:  Daily  Entered: Dec 19 2023 11:11AM

## 2023-12-25 NOTE — PROGRESS NOTE ADULT - ASSESSMENT
imp    heart failure with preserved ef /af large LEFT pleural effusion  marked pre renal azotemia?overdiuresis    suggest  repeat cxr tomw    would get renal imput    given left effusion out of proportion to right suspect a non cardiac etiology

## 2023-12-25 NOTE — PROGRESS NOTE ADULT - SUBJECTIVE AND OBJECTIVE BOX
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 Patient with recent hospitalization for CHF exacerbation 11/22-11/27 and he left AMA. Patient is a poor historian and unable to give reliable history. Denies any chest pain, palpitations, N/V, HA, dizziness. Reports he takes his medications most of the time. He is concerned about blisters on his leg but unable to recognize HF exacerbation being the underlying issue. Also reports constipation but denies N/V/D. Reports lack of appetite but denies dysphagia.    INTERVAL HPI: Patient seen and examined at bedside. Pt reports feeling better with NC in place, but BLE still edematous. No overnight events.    Vital Signs Last 24 Hrs  T(C): 36.5 (25 Dec 2023 05:04), Max: 36.6 (24 Dec 2023 19:30)  T(F): 97.7 (25 Dec 2023 05:04), Max: 97.9 (24 Dec 2023 19:30)  HR: 64 (25 Dec 2023 08:48) (64 - 89)  BP: 129/68 (25 Dec 2023 05:04) (117/64 - 129/68)  BP(mean): 88 (25 Dec 2023 05:04) (88 - 88)  RR: 17 (25 Dec 2023 05:04) (17 - 18)  SpO2: 98% (25 Dec 2023 08:48) (94% - 98%)    Parameters below as of 25 Dec 2023 08:48  Patient On (Oxygen Delivery Method): room air      PHYSICAL EXAM:  Constitutional: Pt sitting in chair, awake and alert, NAD  HEENT: EOMI, normal hearing, moist mucous membranes  Respiratory: nonlabored breathing, good air entry, decreased breath sounds on left, bibasilar rales noted worse on L  Cardiovascular: S1S2+, RRR, no M/G/R  Gastrointestinal: BS+, soft, NT/ND, no guarding, no rebound  Extremities: 2+ pitting edema b/l LE, LE blisters noted b/l ACE wrapped  Vascular: 2+ peripheral pulses  Neurological: AAOx3, no focal deficits      LABS: Personally reviewed                                   9.4    7.86  )-----------( 156      ( 25 Dec 2023 05:15 )             29.0     12-25    143  |  102  |  111<H>  ----------------------------<  106<H>  3.9   |  35<H>  |  1.89<H>    Ca    9.2      25 Dec 2023 05:15  Mg     2.7     12-25    TPro  7.6  /  Alb  2.6<L>  /  TBili  0.6  /  DBili  x   /  AST  20  /  ALT  16  /  AlkPhos  62  12-25    LIVER FUNCTIONS - ( 25 Dec 2023 05:15 )  Alb: 2.6 g/dL / Pro: 7.6 g/dL / ALK PHOS: 62 U/L / ALT: 16 U/L / AST: 20 U/L / GGT: x               RADIOLOGY & ADDITIONAL TESTS: Personally reviewed.       < from: Xray Chest 1 View- PORTABLE-Routine (Xray Chest 1 View- PORTABLE-Routine in AM.) (12.21.23 @ 09:40) >  IMPRESSION:  Similar left pleural effusion.    < from: CT Chest No Cont (12.20.23 @ 11:11) >  IMPRESSION: Moderate left pleural effusion. Band type opacity is   identified within the right lower lobe, likely related to atelectasis.   Opacity within the lingular segment of the left upper lobe and left lower   lobe is consistent with atelectasis; however, underlying parenchymal   infiltrate/consolidation cannot be excluded. Mediastinal lymph nodes   identified measuring up to 2.6 x 2.0 cm.    < from: Xray Chest 1 View- PORTABLE-Routine (Xray Chest 1 View- PORTABLE-Routine .) (12.19.23 @ 15:46) >  IMPRESSION:  1. Slight decrease in left pleural effusion compared to the prior exam   with no pneumothorax on either side.  2. Persistent consolidation in the left lower lobe, consistent with   atelectasis and/or infiltrate, with coexistent smaller left pleural   effusion.  3. Prominent cardiac silhouette with atherosclerosis, but without CHF.    < from: Xray Chest 1 View- PORTABLE-Urgent (12.18.23 @ 11:17) >  IMPRESSION: Increasingleft effusion since previous exam with associated   atelectatic change. Right chest is unchanged. Cardiomegaly.   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 Continued   follow-up suggested

## 2023-12-25 NOTE — PROGRESS NOTE ADULT - ASSESSMENT
Physical Examination:  GENERAL:               Alert, Oriented, No acute distress.    HEENT:                   No JVD, Moist MM, fresh blood in left nare  PULM:                     Bilateral air entry diminished on left,  No Rales, No Rhonchi, No Wheezing  CVS:                         S1, S2,  +murmur  ABD:                        Soft, nondistended, nontender, normoactive bowel sounds,   EXT:                         ++edema with blisters , nontender, No Cyanosis or Clubbing   NEURO:                  Alert, oriented, interactive, nonfocal, follows commands  PSYC:                      Calm, + Insight.      Assessment  ERV  Dyspnea suspect due to acute on chronic Diastolic CHF and Chronic Left pleural effusions  s/p thoracentesis 11/24/23 and 12/19/2023    Afib on a/c  Cigar smoker, at risk for copd.   Worsening renal function on CKD    Plan  pleural effusion recurrent - appears exudative and  bloody effusion     need to f/u cyto, patient will need pleuroscopy and ? pleural biopsy if cytology negative.    out patient f/u with thoracic surgery may be indicated on discharge  CXR stable  left effusion  Cr improving  diurese as tolerated   monitor on room air  out of bed as tolerated  optimize cardiac meds as per Cardio  Rest of care as per primary team.  Can follow up outpatient for pleural fluid results, will likely need thoracic out patient f/u for any intervention if needed

## 2023-12-25 NOTE — PROGRESS NOTE ADULT - SUBJECTIVE AND OBJECTIVE BOX
Follow-up Pulmonary Progress Note  Chief Complaint : Urticaria      patient seen and examined  states no resp complaints  but dosnt feel well  LE edema       Allergies :No Known Allergies      PAST MEDICAL & SURGICAL HISTORY:  Afib    Congestive heart failure (CHF)    CVA (cerebral vascular accident)    Hypertension, unspecified type    Chronic obstructive pulmonary disease (COPD)    No significant past surgical history        Medications:  MEDICATIONS  (STANDING):  allopurinol 100 milliGRAM(s) Oral daily  apixaban 2.5 milliGRAM(s) Oral every 12 hours  aspirin enteric coated 81 milliGRAM(s) Oral daily  atorvastatin 40 milliGRAM(s) Oral at bedtime  bacitracin   Ointment 1 Application(s) Topical daily  budesonide 160 MICROgram(s)/formoterol 4.5 MICROgram(s) Inhaler 2 Puff(s) Inhalation two times a day  melatonin 3 milliGRAM(s) Oral at bedtime  metoprolol succinate ER 50 milliGRAM(s) Oral daily  pantoprazole    Tablet 40 milliGRAM(s) Oral before breakfast  polyethylene glycol 3350 17 Gram(s) Oral two times a day  senna 2 Tablet(s) Oral at bedtime  tiotropium 2.5 MICROgram(s) Inhaler 2 Puff(s) Inhalation daily    MEDICATIONS  (PRN):  acetaminophen     Tablet .. 650 milliGRAM(s) Oral every 6 hours PRN Temp greater or equal to 38C (100.4F), Mild Pain (1 - 3)  albuterol    90 MICROgram(s) HFA Inhaler 2 Puff(s) Inhalation every 6 hours PRN Shortness of Breath and/or Wheezing      Antibiotics History      Heme Medications   apixaban 2.5 milliGRAM(s) Oral every 12 hours, 12-20-23 @ 14:46  aspirin enteric coated 81 milliGRAM(s) Oral daily, 12-18-23 @ 14:40      GI Medications  pantoprazole    Tablet 40 milliGRAM(s) Oral before breakfast, 12-18-23 @ 14:42, Routine  polyethylene glycol 3350 17 Gram(s) Oral two times a day, 12-19-23 @ 08:53,   senna 2 Tablet(s) Oral at bedtime, 12-18-23 @ 14:17, Routine        LABS:                        9.4    7.86  )-----------( 156      ( 25 Dec 2023 05:15 )             29.0     12-25    143  |  102  |  111<H>  ----------------------------<  106<H>  3.9   |  35<H>  |  1.89<H>    Ca    9.2      25 Dec 2023 05:15  Mg     2.7     12-25    TPro  7.6  /  Alb  2.6<L>  /  TBili  0.6  /  DBili  x   /  AST  20  /  ALT  16  /  AlkPhos  62  12-25    Fluid characteristics  -- 12-19 @ 13:46  pH 7.5    tprot 5.2    Cell count  Appearance Bloody  Fluid type --  BF lymph 33  color Red  eosinophil 3  PMN --  Mesothelial 1  Monocyte 35  Other body cells 0        Trend Bun/Cr  12-25-23 @ 05:15  BUN/CR -  111<H> / 1.89<H>  12-24-23 @ 06:09  BUN/CR -  110<H> / 2.06<H>  12-23-23 @ 09:23  BUN/CR -  107<H> / 1.98<H>  12-22-23 @ 07:24  BUN/CR -  105<H> / 2.05<H>  12-21-23 @ 07:56  BUN/CR -  93<H> / 2.03<H>  12-20-23 @ 05:57  BUN/CR -  83<H> / 2.09<H>  12-19-23 @ 06:09  BUN/CR -  88<H> / 1.92<H>  12-18-23 @ 10:15  BUN/CR -  85<H> / 1.90<H>  11-27-23 @ 06:42  BUN/CR -  50<H> / 1.73<H>  11-26-23 @ 07:00  BUN/CR -  50<H> / 1.65<H>  11-25-23 @ 05:45  BUN/CR -  52<H> / 1.71<H>  11-24-23 @ 06:55  BUN/CR -  56<H> / 1.67<H>              CULTURES: (if applicable)    Culture - Fungal, Body Fluid (collected 12-19-23 @ 13:46)  Source: Pleural Fl Pleural Fluid  Preliminary Report (12-23-23 @ 15:03):    Culture is being performed. Fungal cultures are held for 4 weeks.    Culture - Body Fluid with Gram Stain (collected 12-19-23 @ 13:46)  Source: Pleural Fl Pleural Fluid  Gram Stain (12-20-23 @ 02:07):    polymorphonuclear leukocytes seen    No organisms seen    by cytocentrifuge  Final Report (12-24-23 @ 17:18):    No growth      Rapid RVP Result: Detected (12-18-23 @ 10:15)        VITALS:  T(C): 36.5 (12-25-23 @ 05:04), Max: 36.6 (12-24-23 @ 19:30)  T(F): 97.7 (12-25-23 @ 05:04), Max: 97.9 (12-24-23 @ 19:30)  HR: 64 (12-25-23 @ 08:48) (64 - 89)  BP: 129/68 (12-25-23 @ 05:04) (117/64 - 129/68)  BP(mean): 88 (12-25-23 @ 05:04) (88 - 88)  ABP: --  ABP(mean): --  RR: 17 (12-25-23 @ 05:04) (17 - 18)  SpO2: 98% (12-25-23 @ 08:48) (94% - 98%)  CVP(mm Hg): --  CVP(cm H2O): --    Ins and Outs     12-24-23 @ 07:01  -  12-25-23 @ 07:00  --------------------------------------------------------  IN: 0 mL / OUT: 200 mL / NET: -200 mL                I&O's Detail    24 Dec 2023 07:01  -  25 Dec 2023 07:00  --------------------------------------------------------  IN:  Total IN: 0 mL    OUT:    Voided (mL): 200 mL  Total OUT: 200 mL    Total NET: -200 mL

## 2023-12-26 NOTE — PROGRESS NOTE ADULT - NUTRITIONAL ASSESSMENT
This patient has been assessed with a concern for Malnutrition and has been determined to have a diagnosis/diagnoses of Severe protein-calorie malnutrition.    This patient is being managed with:   Diet Regular-  1000mL Fluid Restriction ([id number])  Low Sodium  Piero(7 Gm Arginine/7 Gm Glut/1.2 Gm HMB     Qty per Day:  2  Supplement Feeding Modality:  Oral  Ensure Plus High Protein Cans or Servings Per Day:  1       Frequency:  Daily  Entered: Dec 19 2023 11:11AM   This patient has been assessed with a concern for Malnutrition and has been determined to have a diagnosis/diagnoses of Severe protein-calorie malnutrition.    This patient is being managed with:   Diet Regular-  1000mL Fluid Restriction ([id number])  Low Sodium  Piero(7 Gm Arginine/7 Gm Glut/1.2 Gm HMB     Qty per Day:  2  Supplement Feeding Modality:  Oral  Ensure Plus High Protein Cans or Servings Per Day:  1       Frequency:  Daily  Entered: Dec 19 2023 11:11AM

## 2023-12-26 NOTE — PROGRESS NOTE ADULT - ASSESSMENT
Improving chf  af, rate controlled  ashd  pleural effusion, exudative,  cytology pending  improved Creat    suggest  current cardiac meds including current diuretic dose  awaiting cytology  d/w dr neal

## 2023-12-26 NOTE — PROGRESS NOTE ADULT - SUBJECTIVE AND OBJECTIVE BOX
Follow up for :  chf ashd af    Interval history: awaiting thoracentesis results    SUBJ:  less sob, less edema    PMH  Afib    Congestive heart failure (CHF)    CVA (cerebral vascular accident)    Hypertension, unspecified type    Chronic obstructive pulmonary disease (COPD)        MEDICATIONS  (STANDING):  allopurinol 100 milliGRAM(s) Oral daily  apixaban 2.5 milliGRAM(s) Oral every 12 hours  aspirin enteric coated 81 milliGRAM(s) Oral daily  atorvastatin 40 milliGRAM(s) Oral at bedtime  bacitracin   Ointment 1 Application(s) Topical daily  budesonide 160 MICROgram(s)/formoterol 4.5 MICROgram(s) Inhaler 2 Puff(s) Inhalation two times a day  furosemide   Injectable 60 milliGRAM(s) IV Push once  melatonin 3 milliGRAM(s) Oral at bedtime  metoprolol succinate ER 50 milliGRAM(s) Oral daily  pantoprazole    Tablet 40 milliGRAM(s) Oral before breakfast  polyethylene glycol 3350 17 Gram(s) Oral two times a day  senna 2 Tablet(s) Oral at bedtime  tiotropium 2.5 MICROgram(s) Inhaler 2 Puff(s) Inhalation daily    MEDICATIONS  (PRN):  acetaminophen     Tablet .. 650 milliGRAM(s) Oral every 6 hours PRN Temp greater or equal to 38C (100.4F), Mild Pain (1 - 3)  albuterol    90 MICROgram(s) HFA Inhaler 2 Puff(s) Inhalation every 6 hours PRN Shortness of Breath and/or Wheezing        PHYSICAL EXAM:  Vital Signs Last 24 Hrs  T(C): 36.4 (26 Dec 2023 05:00), Max: 36.8 (25 Dec 2023 20:46)  T(F): 97.6 (26 Dec 2023 05:00), Max: 98.2 (25 Dec 2023 20:46)  HR: 74 (26 Dec 2023 05:00) (74 - 103)  BP: 134/56 (26 Dec 2023 05:00) (127/53 - 158/69)  BP(mean): --  RR: 17 (26 Dec 2023 05:00) (17 - 18)  SpO2: 94% (26 Dec 2023 05:00) (94% - 96%)    Parameters below as of 26 Dec 2023 05:00  Patient On (Oxygen Delivery Method): room air        GENERAL: NAD, well-groomed, well-developed  HEAD:  Atraumatic, Normocephalic  EYES:  conjunctiva and sclera clear  ENT: Moist mucous membranes,  NECK: Supple, No JVD, no bruits  CHEST/LUNG:  dullness left, few rales r base  HEART: No murmurs, rubs, or gallops PMI non displaced.  ABDOMEN: Soft, Nontender, Nondistended; Bowel sounds present  EXTREMITIES:   1-2+ edema  NERVOUS SYSTEM:  Alert       TELEMETRY:   af controlled vr        LABS:                        9.1    7.08  )-----------( 171      ( 26 Dec 2023 08:52 )             28.8         143  |  101  |  110<H>  ----------------------------<  104<H>  4.1   |  33<H>  |  1.77<H>    Ca    9.1      26 Dec 2023 08:52  Mg     2.7         TPro  7.3  /  Alb  2.6<L>  /  TBili  0.7  /  DBili  x   /  AST  23  /  ALT  19  /  AlkPhos  61                I&O's Summary    25 Dec 2023 07:01  -  26 Dec 2023 07:00  --------------------------------------------------------  IN: 0 mL / OUT: 400 mL / NET: -400 mL          RADIOLOGY & ADDITIONAL STUDIES:  < from: Xray Chest 1 View- PORTABLE-Urgent (Xray Chest 1 View- PORTABLE-Urgent .) (23 @ 11:11) >    ACC: [id number] EXAM:  XR CHEST PORTABLE URGENT 1V   ORDERED BY: SAL RAMSAY     PROCEDURE DATE:  2023          INTERPRETATION:  AP chest on 2023 at 10:43 AM.    Heart size difficult to assess.    There is a large left effusion roughly similar to .    IMPRESSION: Persistent large left effusion.    --- End of Report ---            BISMARK ZAMUDIO MD; Attending Radiologist  This document has been electronically signed. Dec 23 2023 12:57PM    < end of copied text >      ECHO:    < from: TTE Echo Limited or F/U (23 @ 08:05) >  ACC: [id number] EXAM:  ECHO TTE WO CON FU LTD [id number]                          PROCEDURE DATE:  2023          INTERPRETATION:  TRANSTHORACIC ECHOCARDIOGRAM REPORT        Patient Name:   RAD SINGLETON Patient Location: [address] [place] [address] Rec #:  [id number]            Accession #:      [id number]  Account #:      [id number]             Height:           66.0 in 167.6 cm  YOB: 1934          Weight:           155.0 lb 70.30 kg  Patient Age:    89 years           BSA:              1.79 m²  Patient Gender: M                  BP:               108/55 mmHg      Date of Exam:        2023 8:05:00 AM  Sonographer:         Michael Beckwith  Referring Physician: BRITTANY MATHIAS    Procedure:     Follow up or Limited Echocardiogram.  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        2D AND M-MODE MEASUREMENTS (normal ranges within parentheses):  Left Ventricle:                  Normal   Aorta/Left Atrium:            Normal  IVSd (2D):              1.34 cm (0.7-1.1) Aortic Root (2D):  3.49 cm   (2.4-3.7)  LVPWd (2D):             1.32 cm (0.7-1.1) Left Atrium (2D):  5.17 cm   (1.9-4.0)  LVIDd (2D):             4.38 cm (3.4-5.7)  LVIDs (2D):        3.04 cm  LV FS (2D):             30.5 %   (>25%)  LV EF (2D):              58 %    (>55%)  Relative Wall Thickness  0.60    (<0.42)    LV SYSTOLIC FUNCTION BY 2D PLANIMETRY (MOD):  EF-A4C View: 60.5 % EF-A2C View: 60.9 % EF-Biplane: 60 %    LV DIASTOLIC FUNCTION:  MV Peak E: 2.02 m/s Decel Time: 646 msec  MV Peak A: 0.49 m/s  E/A Ratio: 4.13    SPECTRAL DOPPLER ANALYSIS (where applicable):  Mitral Valve:  MV Mean Grad: 8.1 mmHg MV P1/2 Time: 187.29 msec                         MV Area, PHT: 1.17 cm²    Aortic Valve: AoV Max Ron: 1.48 m/s AoV Peak P.8 mmHg AoV Mean P.3 mmHg    LVOT Vmax: 0.79 m/s LVOT VTI: 0.175 m LVOT Diameter: 2.27 cm    AoV Area, Vmax: 2.16 cm² AoV Area, VTI: 2.21 cm² AoV Area, Vmn: 2.03 cm²  Ao VTI: 0.322  Aortic Insufficiency:  AI Half-time:  499 msec  AI Decel Rate: 2.02 m/s²    Tricuspid Valve and PA/RV Systolic Pressure: TR Max Velocity: 2.40 m/s RA   Pressure: 15 mmHg RVSP/PASP: 38.0 mmHg      PHYSICIAN INTERPRETATION:  Left Ventricle: The left ventricular internal cavity size is normal. Left   ventricular wall thickness is mildly increased.  Global LV systolic function was normal. Left ventricular ejection   fraction, by visual estimation, is 50 to 55%.  Right Ventricle: The right ventricle is not well visualized, appears   generally normal in size.  Left Atrium: Left atrial enlargement.  Right Atrium: Right atrial enlargement.  Pericardium: There is no evidence of pericardial effusion. There is a   moderate pleural effusion in both leftand right lateral regions.  Mitral Valve: Moderate thickening and calcification of the anterior and   posterior mitral valve leaflets. There is mild mitral annular   calcification. Mild mitral valve regurgitation is seen. Moderate mitral   valve stenosis (mean gradient 8 mmHg at HR 69 BPM, MVA 1.3 cm^2 by PHT   175 msec).  Tricuspid Valve: Mild-moderate tricuspid regurgitation is visualized.   Estimated pulmonary artery systolic pressure is 38.0 mmHg assuming a   right atrial pressure of 15 mmHg,which is consistent with borderline   pulmonary hypertension.  Aortic Valve: The aortic valve is trileaflet. Sclerotic aortic valve with   normal opening. Mild aortic valve regurgitation is seen. Aortic valve   leaflet calcification. No aortic valve stenosis.  Pulmonic Valve: The pulmonic valve was not well visualized. Trace   pulmonic valve regurgitation.  Aorta: Aortic root measured at Sinus of Valsalva is normal.  Venous: The inferior vena cava was dilated, with respiratory size   variation less than 50%.      Summary:   1. The heart rhythm is irregular throughout the study.   2. Normal global left ventricular systolic function.   3. Left ventricular ejection fraction, by visual estimation, is 50 to   55%.   4. Mildly increased LV wall thickness.   5. Normal left ventricular internal cavity size.   6. The right ventricle is not well visualized, appears generally normal   in size.   7. Left atrial enlargement.   8. Right atrial enlargement.   9. Mild mitral annular calcification.  10. Moderate thickening and calcification of the anterior and posterior   mitral valve leaflets.  11. Moderate mitral valve stenosis (mean gradient 8 mmHg at HR 69 BPM,   MVA 1.3 cm^2 by  msec).  12. Mild mitral valve regurgitation.  13. Mild-moderate tricuspid regurgitation.  14. Aortic valve leaflet calcification. No aortic valve stenosis.  15. Sclerotic aortic valve with normal opening.  16. Mild aortic regurgitation.  17. Estimated pulmonary artery systolic pressure is 38.0 mmHg assuming a   right atrial pressure of 15 mmHg, which is consistent with borderline   pulmonary hypertension.  18. Moderate pleural effusion in both left and right lateral regions.  19. There is no evidence of pericardial effusion.    [id number] Kevin Hong MD Electronically signed on   2023 at 12:57:57 PM        *** Final ***    < end of copied text >         Follow up for :  chf ashd af    Interval history: awaiting thoracentesis results    SUBJ:  less sob, less edema    PMH  Afib    Congestive heart failure (CHF)    CVA (cerebral vascular accident)    Hypertension, unspecified type    Chronic obstructive pulmonary disease (COPD)        MEDICATIONS  (STANDING):  allopurinol 100 milliGRAM(s) Oral daily  apixaban 2.5 milliGRAM(s) Oral every 12 hours  aspirin enteric coated 81 milliGRAM(s) Oral daily  atorvastatin 40 milliGRAM(s) Oral at bedtime  bacitracin   Ointment 1 Application(s) Topical daily  budesonide 160 MICROgram(s)/formoterol 4.5 MICROgram(s) Inhaler 2 Puff(s) Inhalation two times a day  furosemide   Injectable 60 milliGRAM(s) IV Push once  melatonin 3 milliGRAM(s) Oral at bedtime  metoprolol succinate ER 50 milliGRAM(s) Oral daily  pantoprazole    Tablet 40 milliGRAM(s) Oral before breakfast  polyethylene glycol 3350 17 Gram(s) Oral two times a day  senna 2 Tablet(s) Oral at bedtime  tiotropium 2.5 MICROgram(s) Inhaler 2 Puff(s) Inhalation daily    MEDICATIONS  (PRN):  acetaminophen     Tablet .. 650 milliGRAM(s) Oral every 6 hours PRN Temp greater or equal to 38C (100.4F), Mild Pain (1 - 3)  albuterol    90 MICROgram(s) HFA Inhaler 2 Puff(s) Inhalation every 6 hours PRN Shortness of Breath and/or Wheezing        PHYSICAL EXAM:  Vital Signs Last 24 Hrs  T(C): 36.4 (26 Dec 2023 05:00), Max: 36.8 (25 Dec 2023 20:46)  T(F): 97.6 (26 Dec 2023 05:00), Max: 98.2 (25 Dec 2023 20:46)  HR: 74 (26 Dec 2023 05:00) (74 - 103)  BP: 134/56 (26 Dec 2023 05:00) (127/53 - 158/69)  BP(mean): --  RR: 17 (26 Dec 2023 05:00) (17 - 18)  SpO2: 94% (26 Dec 2023 05:00) (94% - 96%)    Parameters below as of 26 Dec 2023 05:00  Patient On (Oxygen Delivery Method): room air        GENERAL: NAD, well-groomed, well-developed  HEAD:  Atraumatic, Normocephalic  EYES:  conjunctiva and sclera clear  ENT: Moist mucous membranes,  NECK: Supple, No JVD, no bruits  CHEST/LUNG:  dullness left, few rales r base  HEART: No murmurs, rubs, or gallops PMI non displaced.  ABDOMEN: Soft, Nontender, Nondistended; Bowel sounds present  EXTREMITIES:   1-2+ edema  NERVOUS SYSTEM:  Alert       TELEMETRY:   af controlled vr        LABS:                        9.1    7.08  )-----------( 171      ( 26 Dec 2023 08:52 )             28.8         143  |  101  |  110<H>  ----------------------------<  104<H>  4.1   |  33<H>  |  1.77<H>    Ca    9.1      26 Dec 2023 08:52  Mg     2.7         TPro  7.3  /  Alb  2.6<L>  /  TBili  0.7  /  DBili  x   /  AST  23  /  ALT  19  /  AlkPhos  61                I&O's Summary    25 Dec 2023 07:01  -  26 Dec 2023 07:00  --------------------------------------------------------  IN: 0 mL / OUT: 400 mL / NET: -400 mL          RADIOLOGY & ADDITIONAL STUDIES:  < from: Xray Chest 1 View- PORTABLE-Urgent (Xray Chest 1 View- PORTABLE-Urgent .) (23 @ 11:11) >    ACC: [id number] EXAM:  XR CHEST PORTABLE URGENT 1V   ORDERED BY: SAL RAMSAY     PROCEDURE DATE:  2023          INTERPRETATION:  AP chest on 2023 at 10:43 AM.    Heart size difficult to assess.    There is a large left effusion roughly similar to .    IMPRESSION: Persistent large left effusion.    --- End of Report ---            BISMARK ZAMUDIO MD; Attending Radiologist  This document has been electronically signed. Dec 23 2023 12:57PM    < end of copied text >      ECHO:    < from: TTE Echo Limited or F/U (23 @ 08:05) >  ACC: [id number] EXAM:  ECHO TTE WO CON FU LTD [id number]                          PROCEDURE DATE:  2023          INTERPRETATION:  TRANSTHORACIC ECHOCARDIOGRAM REPORT        Patient Name:   RAD SINGLETON Patient Location: [address] [place] [address] Rec #:  [id number]            Accession #:      [id number]  Account #:      [id number]             Height:           66.0 in 167.6 cm  YOB: 1934          Weight:           155.0 lb 70.30 kg  Patient Age:    89 years           BSA:              1.79 m²  Patient Gender: M                  BP:               108/55 mmHg      Date of Exam:        2023 8:05:00 AM  Sonographer:         Michael Beckwith  Referring Physician: BRITTANY MATHIAS    Procedure:     Follow up or Limited Echocardiogram.  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        2D AND M-MODE MEASUREMENTS (normal ranges within parentheses):  Left Ventricle:                  Normal   Aorta/Left Atrium:            Normal  IVSd (2D):              1.34 cm (0.7-1.1) Aortic Root (2D):  3.49 cm   (2.4-3.7)  LVPWd (2D):             1.32 cm (0.7-1.1) Left Atrium (2D):  5.17 cm   (1.9-4.0)  LVIDd (2D):             4.38 cm (3.4-5.7)  LVIDs (2D):        3.04 cm  LV FS (2D):             30.5 %   (>25%)  LV EF (2D):              58 %    (>55%)  Relative Wall Thickness  0.60    (<0.42)    LV SYSTOLIC FUNCTION BY 2D PLANIMETRY (MOD):  EF-A4C View: 60.5 % EF-A2C View: 60.9 % EF-Biplane: 60 %    LV DIASTOLIC FUNCTION:  MV Peak E: 2.02 m/s Decel Time: 646 msec  MV Peak A: 0.49 m/s  E/A Ratio: 4.13    SPECTRAL DOPPLER ANALYSIS (where applicable):  Mitral Valve:  MV Mean Grad: 8.1 mmHg MV P1/2 Time: 187.29 msec                         MV Area, PHT: 1.17 cm²    Aortic Valve: AoV Max Ron: 1.48 m/s AoV Peak P.8 mmHg AoV Mean P.3 mmHg    LVOT Vmax: 0.79 m/s LVOT VTI: 0.175 m LVOT Diameter: 2.27 cm    AoV Area, Vmax: 2.16 cm² AoV Area, VTI: 2.21 cm² AoV Area, Vmn: 2.03 cm²  Ao VTI: 0.322  Aortic Insufficiency:  AI Half-time:  499 msec  AI Decel Rate: 2.02 m/s²    Tricuspid Valve and PA/RV Systolic Pressure: TR Max Velocity: 2.40 m/s RA   Pressure: 15 mmHg RVSP/PASP: 38.0 mmHg      PHYSICIAN INTERPRETATION:  Left Ventricle: The left ventricular internal cavity size is normal. Left   ventricular wall thickness is mildly increased.  Global LV systolic function was normal. Left ventricular ejection   fraction, by visual estimation, is 50 to 55%.  Right Ventricle: The right ventricle is not well visualized, appears   generally normal in size.  Left Atrium: Left atrial enlargement.  Right Atrium: Right atrial enlargement.  Pericardium: There is no evidence of pericardial effusion. There is a   moderate pleural effusion in both leftand right lateral regions.  Mitral Valve: Moderate thickening and calcification of the anterior and   posterior mitral valve leaflets. There is mild mitral annular   calcification. Mild mitral valve regurgitation is seen. Moderate mitral   valve stenosis (mean gradient 8 mmHg at HR 69 BPM, MVA 1.3 cm^2 by PHT   175 msec).  Tricuspid Valve: Mild-moderate tricuspid regurgitation is visualized.   Estimated pulmonary artery systolic pressure is 38.0 mmHg assuming a   right atrial pressure of 15 mmHg,which is consistent with borderline   pulmonary hypertension.  Aortic Valve: The aortic valve is trileaflet. Sclerotic aortic valve with   normal opening. Mild aortic valve regurgitation is seen. Aortic valve   leaflet calcification. No aortic valve stenosis.  Pulmonic Valve: The pulmonic valve was not well visualized. Trace   pulmonic valve regurgitation.  Aorta: Aortic root measured at Sinus of Valsalva is normal.  Venous: The inferior vena cava was dilated, with respiratory size   variation less than 50%.      Summary:   1. The heart rhythm is irregular throughout the study.   2. Normal global left ventricular systolic function.   3. Left ventricular ejection fraction, by visual estimation, is 50 to   55%.   4. Mildly increased LV wall thickness.   5. Normal left ventricular internal cavity size.   6. The right ventricle is not well visualized, appears generally normal   in size.   7. Left atrial enlargement.   8. Right atrial enlargement.   9. Mild mitral annular calcification.  10. Moderate thickening and calcification of the anterior and posterior   mitral valve leaflets.  11. Moderate mitral valve stenosis (mean gradient 8 mmHg at HR 69 BPM,   MVA 1.3 cm^2 by  msec).  12. Mild mitral valve regurgitation.  13. Mild-moderate tricuspid regurgitation.  14. Aortic valve leaflet calcification. No aortic valve stenosis.  15. Sclerotic aortic valve with normal opening.  16. Mild aortic regurgitation.  17. Estimated pulmonary artery systolic pressure is 38.0 mmHg assuming a   right atrial pressure of 15 mmHg, which is consistent with borderline   pulmonary hypertension.  18. Moderate pleural effusion in both left and right lateral regions.  19. There is no evidence of pericardial effusion.    [id number] Kevin Hong MD Electronically signed on   2023 at 12:57:57 PM        *** Final ***    < end of copied text >

## 2023-12-26 NOTE — PROGRESS NOTE ADULT - ASSESSMENT
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 Need to obtain collateral from family, patient is poor historian  -Home regimen is torsemide 100 daily per PCP Dr. Rubio    -strict I/os, daily weights, fluid restriction and low salt diet  -monitor on tele  -cardio recs appreciated: If renal function stabilizes then may consider SGLT2-I this admission  -improving; transition to po lasix    #Pleural effusion, left   -s/p thoracentesis with 1 L removal during last hospitalization  -cytopath negative for malignancy. Suspect 2/2 HF exacerbation  -recommend aggressive diuresis, if respiratory distress - may need therapeutic thoracentesis.  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    #COPD   -not in exacerbation  -target SpO2 88-92%. Avoid hyperoxygenation   -c/w spiriva, Symbicort and albuterol PRN  - pulm recommendations appreciated    #HLD  -cont lipitor    #enterovirus  -supportive care    #HTN  -cont hydralazine  -cont toprol  -cont torsemide    #DVT PPX  -on eliquis    #FULL CODE  -palliative consulted    dispo: wean off oxygen; dc planning     Case discussed with Dr Gilliland

## 2023-12-26 NOTE — PROGRESS NOTE ADULT - ATTENDING COMMENTS
Edema slowly improving     patient advised not to manipulate LE blisters   Diuretic based on renal function  awaiting thora cytology

## 2023-12-26 NOTE — PROGRESS NOTE ADULT - ASSESSMENT
Physical Examination:  GENERAL:               Alert, Oriented, No acute distress.    HEENT:                   No JVD, Moist MM, fresh blood in left nare  PULM:                     Bilateral air entry diminished on left,  No Rales, No Rhonchi, No Wheezing  CVS:                         S1, S2,  +murmur  ABD:                        Soft, nondistended, nontender, normoactive bowel sounds,   EXT:                         ++edema with blisters , nontender, No Cyanosis or Clubbing   NEURO:                  Alert, oriented, interactive, nonfocal, follows commands  PSYC:                      Calm, + Insight.      Assessment  ERV  Dyspnea suspect due to acute on chronic Diastolic CHF and Chronic Left pleural effusions  s/p thoracentesis 11/24/23 and 12/19/2023    Exudative and 12/19- bloody  Afib on a/c  Cigar smoker, at risk for copd.   Worsening renal function on CKD    Plan  pleural effusion recurrent - appears exudative and  bloody effusion     need to f/u cyto, patient will need pleuroscopy and ? pleural biopsy if cytology negative.    out patient f/u with thoracic surgery may be indicated on discharge vs transfer for pelruoscopy  Core lab called d/w pathology asked to expedite sample and call back when read    Cr improving  diurese as tolerated   monitor on room air  out of bed as tolerated  optimize cardiac meds as per Cardio  Rest of care as per primary team.  Can follow up outpatient for pleural fluid results, will likely need thoracic out patient f/u for any intervention if needed  D/w Dr. Gilliland

## 2023-12-26 NOTE — PROGRESS NOTE ADULT - SUBJECTIVE AND OBJECTIVE BOX
Follow-up Pulmonary Progress Note  Chief Complaint : Urticaria    states less sob  le edema less  no complaints  wants to know when to go home       Allergies :No Known Allergies      PAST MEDICAL & SURGICAL HISTORY:  Afib    Congestive heart failure (CHF)    CVA (cerebral vascular accident)    Hypertension, unspecified type    Chronic obstructive pulmonary disease (COPD)    No significant past surgical history        Medications:  MEDICATIONS  (STANDING):  allopurinol 100 milliGRAM(s) Oral daily  apixaban 2.5 milliGRAM(s) Oral every 12 hours  aspirin enteric coated 81 milliGRAM(s) Oral daily  atorvastatin 40 milliGRAM(s) Oral at bedtime  bacitracin   Ointment 1 Application(s) Topical daily  budesonide 160 MICROgram(s)/formoterol 4.5 MICROgram(s) Inhaler 2 Puff(s) Inhalation two times a day  melatonin 3 milliGRAM(s) Oral at bedtime  metoprolol succinate ER 50 milliGRAM(s) Oral daily  pantoprazole    Tablet 40 milliGRAM(s) Oral before breakfast  polyethylene glycol 3350 17 Gram(s) Oral two times a day  senna 2 Tablet(s) Oral at bedtime  tiotropium 2.5 MICROgram(s) Inhaler 2 Puff(s) Inhalation daily    MEDICATIONS  (PRN):  acetaminophen     Tablet .. 650 milliGRAM(s) Oral every 6 hours PRN Temp greater or equal to 38C (100.4F), Mild Pain (1 - 3)  albuterol    90 MICROgram(s) HFA Inhaler 2 Puff(s) Inhalation every 6 hours PRN Shortness of Breath and/or Wheezing      Antibiotics History      Heme Medications   apixaban 2.5 milliGRAM(s) Oral every 12 hours, 12-20-23 @ 14:46  aspirin enteric coated 81 milliGRAM(s) Oral daily, 12-18-23 @ 14:40      GI Medications  pantoprazole    Tablet 40 milliGRAM(s) Oral before breakfast, 12-18-23 @ 14:42, Routine  polyethylene glycol 3350 17 Gram(s) Oral two times a day, 12-19-23 @ 08:53,   senna 2 Tablet(s) Oral at bedtime, 12-18-23 @ 14:17, Routine        LABS:                        9.1    7.08  )-----------( 171      ( 26 Dec 2023 08:52 )             28.8     12-26    143  |  101  |  110<H>  ----------------------------<  104<H>  4.1   |  33<H>  |  1.77<H>    Ca    9.1      26 Dec 2023 08:52  Mg     2.7     12-25    TPro  7.3  /  Alb  2.6<L>  /  TBili  0.7  /  DBili  x   /  AST  23  /  ALT  19  /  AlkPhos  61  12-26    Fluid characteristics  -- 12-19 @ 13:46  pH 7.5    tprot 5.2    Cell count  Appearance Bloody  Fluid type --  BF lymph 33  color Red  eosinophil 3  PMN --  Mesothelial 1  Monocyte 35  Other body cells 0            Urinalysis Basic - ( 26 Dec 2023 08:52 )    Color: x / Appearance: x / SG: x / pH: x  Gluc: 104 mg/dL / Ketone: x  / Bili: x / Urobili: x   Blood: x / Protein: x / Nitrite: x   Leuk Esterase: x / RBC: x / WBC x   Sq Epi: x / Non Sq Epi: x / Bacteria: x              CULTURES: (if applicable)    Culture - Fungal, Body Fluid (collected 12-19-23 @ 13:46)  Source: Pleural Fl Pleural Fluid  Preliminary Report (12-23-23 @ 15:03):    Culture is being performed. Fungal cultures are held for 4 weeks.    Culture - Body Fluid with Gram Stain (collected 12-19-23 @ 13:46)  Source: Pleural Fl Pleural Fluid  Gram Stain (12-20-23 @ 02:07):    polymorphonuclear leukocytes seen    No organisms seen    by cytocentrifuge  Final Report (12-24-23 @ 17:18):    No growth      Rapid RVP Result: Detected (12-18-23 @ 10:15)           VITALS:  T(C): 36.4 (12-26-23 @ 05:00), Max: 36.8 (12-25-23 @ 20:46)  T(F): 97.6 (12-26-23 @ 05:00), Max: 98.2 (12-25-23 @ 20:46)  HR: 74 (12-26-23 @ 05:00) (74 - 103)  BP: 134/56 (12-26-23 @ 05:00) (127/53 - 158/69)  BP(mean): --  ABP: --  ABP(mean): --  RR: 17 (12-26-23 @ 05:00) (17 - 18)  SpO2: 94% (12-26-23 @ 05:00) (94% - 96%)  CVP(mm Hg): --  CVP(cm H2O): --    Ins and Outs     12-25-23 @ 07:01  -  12-26-23 @ 07:00  --------------------------------------------------------  IN: 0 mL / OUT: 400 mL / NET: -400 mL                I&O's Detail    25 Dec 2023 07:01  -  26 Dec 2023 07:00  --------------------------------------------------------  IN:  Total IN: 0 mL    OUT:    Voided (mL): 400 mL  Total OUT: 400 mL    Total NET: -400 mL

## 2023-12-26 NOTE — PROGRESS NOTE ADULT - SUBJECTIVE AND OBJECTIVE BOX
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 Patient with recent hospitalization for CHF exacerbation 11/22-11/27 and he left AMA. Patient is a poor historian and unable to give reliable history. Denies any chest pain, palpitations, N/V, HA, dizziness. Reports he takes his medications most of the time. He is concerned about blisters on his leg but unable to recognize HF exacerbation being the underlying issue. Also reports constipation but denies N/V/D. Reports lack of appetite but denies dysphagia.    INTERVAL HPI: Patient seen and examined at bedside. Pt reports feeling better with NC. LE pain improving with ace wraps. No overnight events.      REVIEW OF SYSTEMS:  CONSTITUTIONAL: No weakness, fevers or chills  EYES/ENT: No visual changes;  No vertigo or throat pain   NECK: No pain or stiffness  RESPIRATORY: improving SOB  CARDIOVASCULAR: No chest pain or palpitations  GASTROINTESTINAL: No abdominal or epigastric pain. No nausea, vomiting, or hematemesis; No diarrhea or constipation. No melena or hematochezia.  GENITOURINARY: No dysuria, frequency or hematuria  NEUROLOGICAL: No numbness or weakness  SKIN: No itching, burning, rashes, or lesions   All other review of systems is negative unless indicated above    PHYSICAL EXAM:  Vital Signs Last 24 Hrs  T(C): 36.4 (26 Dec 2023 05:00), Max: 36.8 (25 Dec 2023 20:46)  T(F): 97.6 (26 Dec 2023 05:00), Max: 98.2 (25 Dec 2023 20:46)  HR: 74 (26 Dec 2023 05:00) (74 - 103)  BP: 134/56 (26 Dec 2023 05:00) (127/53 - 158/69)  BP(mean): --  RR: 17 (26 Dec 2023 05:00) (17 - 18)  SpO2: 94% (26 Dec 2023 05:00) (94% - 96%)    Parameters below as of 26 Dec 2023 05:00  Patient On (Oxygen Delivery Method): room air      PHYSICAL EXAM:  Constitutional: Pt sitting in chair, awake and alert, NAD  HEENT: EOMI, normal hearing, moist mucous membranes  Respiratory: nonlabored breathing, good air entry, decreased breath sounds on left  Cardiovascular: S1S2+, RRR, no M/G/R  Gastrointestinal: BS+, soft, NT/ND, no guarding, no rebound  Extremities: 2+ pitting edema b/l LE, LE blisters noted b/l ACE wrapped  Vascular: 2+ peripheral pulses  Neurological: AAOx3, no focal deficits      MEDICATIONS:  MEDICATIONS  (STANDING):  allopurinol 100 milliGRAM(s) Oral daily  apixaban 2.5 milliGRAM(s) Oral every 12 hours  aspirin enteric coated 81 milliGRAM(s) Oral daily  atorvastatin 40 milliGRAM(s) Oral at bedtime  bacitracin   Ointment 1 Application(s) Topical daily  budesonide 160 MICROgram(s)/formoterol 4.5 MICROgram(s) Inhaler 2 Puff(s) Inhalation two times a day  melatonin 3 milliGRAM(s) Oral at bedtime  metoprolol succinate ER 50 milliGRAM(s) Oral daily  pantoprazole    Tablet 40 milliGRAM(s) Oral before breakfast  polyethylene glycol 3350 17 Gram(s) Oral two times a day  senna 2 Tablet(s) Oral at bedtime  tiotropium 2.5 MICROgram(s) Inhaler 2 Puff(s) Inhalation daily      LABS: All Labs Reviewed:                        9.1    7.08  )-----------( 171      ( 26 Dec 2023 08:52 )             28.8     12-25    143  |  102  |  111<H>  ----------------------------<  106<H>  3.9   |  35<H>  |  1.89<H>    Ca    9.2      25 Dec 2023 05:15  Mg     2.7     12-25    TPro  7.6  /  Alb  2.6<L>  /  TBili  0.6  /  DBili  x   /  AST  20  /  ALT  16  /  AlkPhos  62  12-25          Blood Culture:   I&O's Summary    25 Dec 2023 07:01  -  26 Dec 2023 07:00  --------------------------------------------------------  IN: 0 mL / OUT: 400 mL / NET: -400 mL      CAPILLARY BLOOD GLUCOSE          RADIOLOGY/EKG:

## 2023-12-27 NOTE — PROGRESS NOTE ADULT - CONVERSATION/DISCUSSION
Diagnosis/Prognosis/MOLST Discussed/Treatment Options
Diagnosis/Prognosis/MOLST Discussed/Treatment Options
Diagnosis/Prognosis

## 2023-12-27 NOTE — PROGRESS NOTE ADULT - ASSESSMENT
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 Need to obtain collateral from family, patient is poor historian  -Home regimen is torsemide 100 daily per PCP Dr. Rubio    -strict I/os, daily weights, fluid restriction and low salt diet  -monitor on tele  -cardio recs appreciated: If renal function stabilizes then may consider SGLT2-I this admission  -improving; transition to po lasix    #Pleural effusion, left   -s/p thoracentesis with 1 L removal during last hospitalization  -cytopath negative for malignancy. Suspect 2/2 HF exacerbation  -recommend aggressive diuresis, if respiratory distress - may need therapeutic thoracentesis.  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    #COPD   -not in exacerbation  -target SpO2 88-92%. 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 Need to obtain collateral from family, patient is poor historian  -Home regimen is torsemide 100 daily per PCP Dr. Rubio    -strict I/os, daily weights, fluid restriction and low salt diet  -monitor on tele  -cardio recs appreciated: If renal function stabilizes then may consider SGLT2-I this admission  -improving; transition to po lasix    #Pleural effusion, left   -s/p thoracentesis with 1 L removal during last hospitalization  -cytopath negative for malignancy. Suspect 2/2 HF exacerbation  -recommend aggressive diuresis, if respiratory distress - may need therapeutic thoracentesis.  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    #COPD   -not in exacerbation  -target SpO2 88-92%. Avoid hyperoxygenation   -c/w spiriva, Symbicort and albuterol PRN  - pulm recommendations appreciated    #HLD  -cont lipitor    #enterovirus  -supportive care    #HTN  -cont hydralazine  -cont toprol  -cont torsemide    #DVT PPX  -on eliquis    #FULL CODE  -palliative consulted    dispo: wean off oxygen; dc planning     Case discussed with Dr Gilliland

## 2023-12-27 NOTE — PROGRESS NOTE ADULT - PROBLEM SELECTOR PLAN 2
-s/p thoracentesis with 1 L removal during last hospitalization  -cytopath negative for malignancy. Suspect 2/2 HF exacerbation  -recommend aggressive diuresis, if respiratory distress - may need therapeutic thoracentesis.  -pulm recs appreciated  -discuss case with cardiothoracic surgery for further recs

## 2023-12-27 NOTE — PROGRESS NOTE ADULT - ASSESSMENT
Improving chf  af, rate controlled  ashd  pleural effusion, exudative,  cytology pending  improved Creat    suggest  current cardiac meds, diuresis as needed as per pulm  awaiting cytology    Carlotta LOYD-BC

## 2023-12-27 NOTE — PROGRESS NOTE ADULT - SUBJECTIVE AND OBJECTIVE BOX
Follow up for :  chf ashd af    Interval history: awaiting thoracentesis results. pt appears comfortable. on room air. sinus 60-70s. mild improvement in l/e    PMH  Afib    Congestive heart failure (CHF)    CVA (cerebral vascular accident)    Hypertension, unspecified type    Chronic obstructive pulmonary disease (COPD)        MEDICATIONS  (STANDING):  allopurinol 100 milliGRAM(s) Oral daily  apixaban 2.5 milliGRAM(s) Oral every 12 hours  aspirin enteric coated 81 milliGRAM(s) Oral daily  atorvastatin 40 milliGRAM(s) Oral at bedtime  bacitracin   Ointment 1 Application(s) Topical daily  budesonide 160 MICROgram(s)/formoterol 4.5 MICROgram(s) Inhaler 2 Puff(s) Inhalation two times a day  furosemide   Injectable 60 milliGRAM(s) IV Push once  melatonin 3 milliGRAM(s) Oral at bedtime  metoprolol succinate ER 50 milliGRAM(s) Oral daily  pantoprazole    Tablet 40 milliGRAM(s) Oral before breakfast  polyethylene glycol 3350 17 Gram(s) Oral two times a day  senna 2 Tablet(s) Oral at bedtime  tiotropium 2.5 MICROgram(s) Inhaler 2 Puff(s) Inhalation daily    MEDICATIONS  (PRN):  acetaminophen     Tablet .. 650 milliGRAM(s) Oral every 6 hours PRN Temp greater or equal to 38C (100.4F), Mild Pain (1 - 3)  albuterol    90 MICROgram(s) HFA Inhaler 2 Puff(s) Inhalation every 6 hours PRN Shortness of Breath and/or Wheezing        PHYSICAL EXAM:  Vital Signs Last 24 Hrs  T(C): 36.4 (26 Dec 2023 05:00), Max: 36.8 (25 Dec 2023 20:46)  T(F): 97.6 (26 Dec 2023 05:00), Max: 98.2 (25 Dec 2023 20:46)  HR: 74 (26 Dec 2023 05:00) (74 - 103)  BP: 134/56 (26 Dec 2023 05:00) (127/53 - 158/69)  BP(mean): --  RR: 17 (26 Dec 2023 05:00) (17 - 18)  SpO2: 94% (26 Dec 2023 05:00) (94% - 96%)    Parameters below as of 26 Dec 2023 05:00  Patient On (Oxygen Delivery Method): room air        GENERAL: NAD, well-groomed, well-developed  HEAD:  Atraumatic, Normocephalic  EYES:  conjunctiva and sclera clear  ENT: Moist mucous membranes,  NECK: Supple, No JVD, no bruits  CHEST/LUNG:  dullness left, few rales r base  HEART: No murmurs, rubs, or gallops PMI non displaced.  ABDOMEN: Soft, Nontender, Nondistended; Bowel sounds present  EXTREMITIES:   1-2+ edema  NERVOUS SYSTEM:  Alert       TELEMETRY:   af controlled vr        LABS:                        9.1    7.08  )-----------( 171      ( 26 Dec 2023 08:52 )             28.8         143  |  101  |  110<H>  ----------------------------<  104<H>  4.1   |  33<H>  |  1.77<H>    Ca    9.1      26 Dec 2023 08:52  Mg     2.7         TPro  7.3  /  Alb  2.6<L>  /  TBili  0.7  /  DBili  x   /  AST  23  /  ALT  19  /  AlkPhos  61                I&O's Summary    25 Dec 2023 07:01  -  26 Dec 2023 07:00  --------------------------------------------------------  IN: 0 mL / OUT: 400 mL / NET: -400 mL          RADIOLOGY & ADDITIONAL STUDIES:  < from: Xray Chest 1 View- PORTABLE-Urgent (Xray Chest 1 View- PORTABLE-Urgent .) (23 @ 11:11) >    ACC: [id number] EXAM:  XR CHEST PORTABLE URGENT 1V   ORDERED BY: SAL RAMSAY     PROCEDURE DATE:  2023          INTERPRETATION:  AP chest on 2023 at 10:43 AM.    Heart size difficult to assess.    There is a large left effusion roughly similar to .    IMPRESSION: Persistent large left effusion.    --- End of Report ---            BISMARK ZAMUDIO MD; Attending Radiologist  This document has been electronically signed. Dec 23 2023 12:57PM    < end of copied text >      ECHO:    < from: TTE Echo Limited or F/U (23 @ 08:05) >  ACC: [id number] EXAM:  ECHO TTE WO CON FU LTD [id number]                          PROCEDURE DATE:  2023          INTERPRETATION:  TRANSTHORACIC ECHOCARDIOGRAM REPORT        Patient Name:   RAD SINGLETON Patient Location: [address] [place] [address] Rec #:  [id number]            Accession #:      [id number]  Account #:      [id number]             Height:           66.0 in 167.6 cm  YOB: 1934          Weight:           155.0 lb 70.30 kg  Patient Age:    89 years           BSA:              1.79 m²  Patient Gender: M                  BP:               108/55 mmHg      Date of Exam:        2023 8:05:00 AM  Sonographer:         Michael Beckwith  Referring Physician: BRITTANY MATHIAS    Procedure:     Follow up or Limited Echocardiogram.  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        2D AND M-MODE MEASUREMENTS (normal ranges within parentheses):  Left Ventricle:                  Normal   Aorta/Left Atrium:            Normal  IVSd (2D):              1.34 cm (0.7-1.1) Aortic Root (2D):  3.49 cm   (2.4-3.7)  LVPWd (2D):             1.32 cm (0.7-1.1) Left Atrium (2D):  5.17 cm   (1.9-4.0)  LVIDd (2D):             4.38 cm (3.4-5.7)  LVIDs (2D):        3.04 cm  LV FS (2D):             30.5 %   (>25%)  LV EF (2D):              58 %    (>55%)  Relative Wall Thickness  0.60    (<0.42)    LV SYSTOLIC FUNCTION BY 2D PLANIMETRY (MOD):  EF-A4C View: 60.5 % EF-A2C View: 60.9 % EF-Biplane: 60 %    LV DIASTOLIC FUNCTION:  MV Peak E: 2.02 m/s Decel Time: 646 msec  MV Peak A: 0.49 m/s  E/A Ratio: 4.13    SPECTRAL DOPPLER ANALYSIS (where applicable):  Mitral Valve:  MV Mean Grad: 8.1 mmHg MV P1/2 Time: 187.29 msec                         MV Area, PHT: 1.17 cm²    Aortic Valve: AoV Max Ron: 1.48 m/s AoV Peak P.8 mmHg AoV Mean P.3 mmHg    LVOT Vmax: 0.79 m/s LVOT VTI: 0.175 m LVOT Diameter: 2.27 cm    AoV Area, Vmax: 2.16 cm² AoV Area, VTI: 2.21 cm² AoV Area, Vmn: 2.03 cm²  Ao VTI: 0.322  Aortic Insufficiency:  AI Half-time:  499 msec  AI Decel Rate: 2.02 m/s²    Tricuspid Valve and PA/RV Systolic Pressure: TR Max Velocity: 2.40 m/s RA   Pressure: 15 mmHg RVSP/PASP: 38.0 mmHg      PHYSICIAN INTERPRETATION:  Left Ventricle: The left ventricular internal cavity size is normal. Left   ventricular wall thickness is mildly increased.  Global LV systolic function was normal. Left ventricular ejection   fraction, by visual estimation, is 50 to 55%.  Right Ventricle: The right ventricle is not well visualized, appears   generally normal in size.  Left Atrium: Left atrial enlargement.  Right Atrium: Right atrial enlargement.  Pericardium: There is no evidence of pericardial effusion. There is a   moderate pleural effusion in both leftand right lateral regions.  Mitral Valve: Moderate thickening and calcification of the anterior and   posterior mitral valve leaflets. There is mild mitral annular   calcification. Mild mitral valve regurgitation is seen. Moderate mitral   valve stenosis (mean gradient 8 mmHg at HR 69 BPM, MVA 1.3 cm^2 by PHT   175 msec).  Tricuspid Valve: Mild-moderate tricuspid regurgitation is visualized.   Estimated pulmonary artery systolic pressure is 38.0 mmHg assuming a   right atrial pressure of 15 mmHg,which is consistent with borderline   pulmonary hypertension.  Aortic Valve: The aortic valve is trileaflet. Sclerotic aortic valve with   normal opening. Mild aortic valve regurgitation is seen. Aortic valve   leaflet calcification. No aortic valve stenosis.  Pulmonic Valve: The pulmonic valve was not well visualized. Trace   pulmonic valve regurgitation.  Aorta: Aortic root measured at Sinus of Valsalva is normal.  Venous: The inferior vena cava was dilated, with respiratory size   variation less than 50%.      Summary:   1. The heart rhythm is irregular throughout the study.   2. Normal global left ventricular systolic function.   3. Left ventricular ejection fraction, by visual estimation, is 50 to   55%.   4. Mildly increased LV wall thickness.   5. Normal left ventricular internal cavity size.   6. The right ventricle is not well visualized, appears generally normal   in size.   7. Left atrial enlargement.   8. Right atrial enlargement.   9. Mild mitral annular calcification.  10. Moderate thickening and calcification of the anterior and posterior   mitral valve leaflets.  11. Moderate mitral valve stenosis (mean gradient 8 mmHg at HR 69 BPM,   MVA 1.3 cm^2 by  msec).  12. Mild mitral valve regurgitation.  13. Mild-moderate tricuspid regurgitation.  14. Aortic valve leaflet calcification. No aortic valve stenosis.  15. Sclerotic aortic valve with normal opening.  16. Mild aortic regurgitation.  17. Estimated pulmonary artery systolic pressure is 38.0 mmHg assuming a   right atrial pressure of 15 mmHg, which is consistent with borderline   pulmonary hypertension.  18. Moderate pleural effusion in both left and right lateral regions.  19. There is no evidence of pericardial effusion.    [id number] Kevin Hong MD Electronically signed on   2023 at 12:57:57 PM        *** Final ***    < end of copied text >         Follow up for :  chf ashd af    Interval history: awaiting thoracentesis results. pt appears comfortable. on room air. sinus 60-70s. mild improvement in l/e    PMH  Afib    Congestive heart failure (CHF)    CVA (cerebral vascular accident)    Hypertension, unspecified type    Chronic obstructive pulmonary disease (COPD)        MEDICATIONS  (STANDING):  allopurinol 100 milliGRAM(s) Oral daily  apixaban 2.5 milliGRAM(s) Oral every 12 hours  aspirin enteric coated 81 milliGRAM(s) Oral daily  atorvastatin 40 milliGRAM(s) Oral at bedtime  bacitracin   Ointment 1 Application(s) Topical daily  budesonide 160 MICROgram(s)/formoterol 4.5 MICROgram(s) Inhaler 2 Puff(s) Inhalation two times a day  furosemide   Injectable 60 milliGRAM(s) IV Push once  melatonin 3 milliGRAM(s) Oral at bedtime  metoprolol succinate ER 50 milliGRAM(s) Oral daily  pantoprazole    Tablet 40 milliGRAM(s) Oral before breakfast  polyethylene glycol 3350 17 Gram(s) Oral two times a day  senna 2 Tablet(s) Oral at bedtime  tiotropium 2.5 MICROgram(s) Inhaler 2 Puff(s) Inhalation daily    MEDICATIONS  (PRN):  acetaminophen     Tablet .. 650 milliGRAM(s) Oral every 6 hours PRN Temp greater or equal to 38C (100.4F), Mild Pain (1 - 3)  albuterol    90 MICROgram(s) HFA Inhaler 2 Puff(s) Inhalation every 6 hours PRN Shortness of Breath and/or Wheezing        PHYSICAL EXAM:  Vital Signs Last 24 Hrs  T(C): 36.4 (26 Dec 2023 05:00), Max: 36.8 (25 Dec 2023 20:46)  T(F): 97.6 (26 Dec 2023 05:00), Max: 98.2 (25 Dec 2023 20:46)  HR: 74 (26 Dec 2023 05:00) (74 - 103)  BP: 134/56 (26 Dec 2023 05:00) (127/53 - 158/69)  BP(mean): --  RR: 17 (26 Dec 2023 05:00) (17 - 18)  SpO2: 94% (26 Dec 2023 05:00) (94% - 96%)    Parameters below as of 26 Dec 2023 05:00  Patient On (Oxygen Delivery Method): room air        GENERAL: NAD, well-groomed, well-developed  HEAD:  Atraumatic, Normocephalic  EYES:  conjunctiva and sclera clear  ENT: Moist mucous membranes,  NECK: Supple, No JVD, no bruits  CHEST/LUNG:  dullness left, few rales r base  HEART: No murmurs, rubs, or gallops PMI non displaced.  ABDOMEN: Soft, Nontender, Nondistended; Bowel sounds present  EXTREMITIES:   1-2+ edema  NERVOUS SYSTEM:  Alert       TELEMETRY:   af controlled vr        LABS:                        9.1    7.08  )-----------( 171      ( 26 Dec 2023 08:52 )             28.8         143  |  101  |  110<H>  ----------------------------<  104<H>  4.1   |  33<H>  |  1.77<H>    Ca    9.1      26 Dec 2023 08:52  Mg     2.7         TPro  7.3  /  Alb  2.6<L>  /  TBili  0.7  /  DBili  x   /  AST  23  /  ALT  19  /  AlkPhos  61                I&O's Summary    25 Dec 2023 07:01  -  26 Dec 2023 07:00  --------------------------------------------------------  IN: 0 mL / OUT: 400 mL / NET: -400 mL          RADIOLOGY & ADDITIONAL STUDIES:  < from: Xray Chest 1 View- PORTABLE-Urgent (Xray Chest 1 View- PORTABLE-Urgent .) (23 @ 11:11) >    ACC: [id number] EXAM:  XR CHEST PORTABLE URGENT 1V   ORDERED BY: SAL RAMSAY     PROCEDURE DATE:  2023          INTERPRETATION:  AP chest on 2023 at 10:43 AM.    Heart size difficult to assess.    There is a large left effusion roughly similar to .    IMPRESSION: Persistent large left effusion.    --- End of Report ---            BISMARK ZAMUDIO MD; Attending Radiologist  This document has been electronically signed. Dec 23 2023 12:57PM    < end of copied text >      ECHO:    < from: TTE Echo Limited or F/U (23 @ 08:05) >  ACC: [id number] EXAM:  ECHO TTE WO CON FU LTD 52347                          PROCEDURE DATE:  2023          INTERPRETATION:  TRANSTHORACIC ECHOCARDIOGRAM REPORT        Patient Name:   RAD SINGLETON Patient Location: [address] [place] [address] Rec #:  [id number]            Accession #:      [id number]  Account #:      [id number]             Height:           66.0 in 167.6 cm  YOB: 1934          Weight:           155.0 lb 70.30 kg  Patient Age:    89 years           BSA:              1.79 m²  Patient Gender: M                  BP:               108/55 mmHg      Date of Exam:        2023 8:05:00 AM  Sonographer:         Michael Beckwith  Referring Physician: BRITTANY MATHIAS    Procedure:     Follow up or Limited Echocardiogram.  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        2D AND M-MODE MEASUREMENTS (normal ranges within parentheses):  Left Ventricle:                  Normal   Aorta/Left Atrium:            Normal  IVSd (2D):              1.34 cm (0.7-1.1) Aortic Root (2D):  3.49 cm   (2.4-3.7)  LVPWd (2D):             1.32 cm (0.7-1.1) Left Atrium (2D):  5.17 cm   (1.9-4.0)  LVIDd (2D):             4.38 cm (3.4-5.7)  LVIDs (2D):        3.04 cm  LV FS (2D):             30.5 %   (>25%)  LV EF (2D):              58 %    (>55%)  Relative Wall Thickness  0.60    (<0.42)    LV SYSTOLIC FUNCTION BY 2D PLANIMETRY (MOD):  EF-A4C View: 60.5 % EF-A2C View: 60.9 % EF-Biplane: 60 %    LV DIASTOLIC FUNCTION:  MV Peak E: 2.02 m/s Decel Time: 646 msec  MV Peak A: 0.49 m/s  E/A Ratio: 4.13    SPECTRAL DOPPLER ANALYSIS (where applicable):  Mitral Valve:  MV Mean Grad: 8.1 mmHg MV P1/2 Time: 187.29 msec                         MV Area, PHT: 1.17 cm²    Aortic Valve: AoV Max Ron: 1.48 m/s AoV Peak P.8 mmHg AoV Mean P.3 mmHg    LVOT Vmax: 0.79 m/s LVOT VTI: 0.175 m LVOT Diameter: 2.27 cm    AoV Area, Vmax: 2.16 cm² AoV Area, VTI: 2.21 cm² AoV Area, Vmn: 2.03 cm²  Ao VTI: 0.322  Aortic Insufficiency:  AI Half-time:  499 msec  AI Decel Rate: 2.02 m/s²    Tricuspid Valve and PA/RV Systolic Pressure: TR Max Velocity: 2.40 m/s RA   Pressure: 15 mmHg RVSP/PASP: 38.0 mmHg      PHYSICIAN INTERPRETATION:  Left Ventricle: The left ventricular internal cavity size is normal. Left   ventricular wall thickness is mildly increased.  Global LV systolic function was normal. Left ventricular ejection   fraction, by visual estimation, is 50 to 55%.  Right Ventricle: The right ventricle is not well visualized, appears   generally normal in size.  Left Atrium: Left atrial enlargement.  Right Atrium: Right atrial enlargement.  Pericardium: There is no evidence of pericardial effusion. There is a   moderate pleural effusion in both leftand right lateral regions.  Mitral Valve: Moderate thickening and calcification of the anterior and   posterior mitral valve leaflets. There is mild mitral annular   calcification. Mild mitral valve regurgitation is seen. Moderate mitral   valve stenosis (mean gradient 8 mmHg at HR 69 BPM, MVA 1.3 cm^2 by PHT   175 msec).  Tricuspid Valve: Mild-moderate tricuspid regurgitation is visualized.   Estimated pulmonary artery systolic pressure is 38.0 mmHg assuming a   right atrial pressure of 15 mmHg,which is consistent with borderline   pulmonary hypertension.  Aortic Valve: The aortic valve is trileaflet. Sclerotic aortic valve with   normal opening. Mild aortic valve regurgitation is seen. Aortic valve   leaflet calcification. No aortic valve stenosis.  Pulmonic Valve: The pulmonic valve was not well visualized. Trace   pulmonic valve regurgitation.  Aorta: Aortic root measured at Sinus of Valsalva is normal.  Venous: The inferior vena cava was dilated, with respiratory size   variation less than 50%.      Summary:   1. The heart rhythm is irregular throughout the study.   2. Normal global left ventricular systolic function.   3. Left ventricular ejection fraction, by visual estimation, is 50 to   55%.   4. Mildly increased LV wall thickness.   5. Normal left ventricular internal cavity size.   6. The right ventricle is not well visualized, appears generally normal   in size.   7. Left atrial enlargement.   8. Right atrial enlargement.   9. Mild mitral annular calcification.  10. Moderate thickening and calcification of the anterior and posterior   mitral valve leaflets.  11. Moderate mitral valve stenosis (mean gradient 8 mmHg at HR 69 BPM,   MVA 1.3 cm^2 by  msec).  12. Mild mitral valve regurgitation.  13. Mild-moderate tricuspid regurgitation.  14. Aortic valve leaflet calcification. No aortic valve stenosis.  15. Sclerotic aortic valve with normal opening.  16. Mild aortic regurgitation.  17. Estimated pulmonary artery systolic pressure is 38.0 mmHg assuming a   right atrial pressure of 15 mmHg, which is consistent with borderline   pulmonary hypertension.  18. Moderate pleural effusion in both left and right lateral regions.  19. There is no evidence of pericardial effusion.    [id number] Kevin Hong MD Electronically signed on   2023 at 12:57:57 PM        *** Final ***    < end of copied text >         Follow up for :  chf ashd af    Interval history: awaiting thoracentesis results. pt appears comfortable. on room air. sinus 60-70s. mild improvement in l/e    PMH  Afib    Congestive heart failure (CHF)    CVA (cerebral vascular accident)    Hypertension, unspecified type    Chronic obstructive pulmonary disease (COPD)        MEDICATIONS  (STANDING):  allopurinol 100 milliGRAM(s) Oral daily  apixaban 2.5 milliGRAM(s) Oral every 12 hours  aspirin enteric coated 81 milliGRAM(s) Oral daily  atorvastatin 40 milliGRAM(s) Oral at bedtime  bacitracin   Ointment 1 Application(s) Topical daily  budesonide 160 MICROgram(s)/formoterol 4.5 MICROgram(s) Inhaler 2 Puff(s) Inhalation two times a day  furosemide   Injectable 60 milliGRAM(s) IV Push once  melatonin 3 milliGRAM(s) Oral at bedtime  metoprolol succinate ER 50 milliGRAM(s) Oral daily  pantoprazole    Tablet 40 milliGRAM(s) Oral before breakfast  polyethylene glycol 3350 17 Gram(s) Oral two times a day  senna 2 Tablet(s) Oral at bedtime  tiotropium 2.5 MICROgram(s) Inhaler 2 Puff(s) Inhalation daily    MEDICATIONS  (PRN):  acetaminophen     Tablet .. 650 milliGRAM(s) Oral every 6 hours PRN Temp greater or equal to 38C (100.4F), Mild Pain (1 - 3)  albuterol    90 MICROgram(s) HFA Inhaler 2 Puff(s) Inhalation every 6 hours PRN Shortness of Breath and/or Wheezing        PHYSICAL EXAM:  Vital Signs Last 24 Hrs  T(C): 36.4 (26 Dec 2023 05:00), Max: 36.8 (25 Dec 2023 20:46)  T(F): 97.6 (26 Dec 2023 05:00), Max: 98.2 (25 Dec 2023 20:46)  HR: 74 (26 Dec 2023 05:00) (74 - 103)  BP: 134/56 (26 Dec 2023 05:00) (127/53 - 158/69)  BP(mean): --  RR: 17 (26 Dec 2023 05:00) (17 - 18)  SpO2: 94% (26 Dec 2023 05:00) (94% - 96%)    Parameters below as of 26 Dec 2023 05:00  Patient On (Oxygen Delivery Method): room air        GENERAL: NAD, well-groomed, well-developed  HEAD:  Atraumatic, Normocephalic  EYES:  conjunctiva and sclera clear  ENT: Moist mucous membranes,  NECK: Supple, No JVD, no bruits  CHEST/LUNG:  dullness left, few rales r base  HEART: No murmurs, rubs, or gallops PMI non displaced.  ABDOMEN: Soft, Nontender, Nondistended; Bowel sounds present  EXTREMITIES:   1-2+ edema  NERVOUS SYSTEM:  Alert       TELEMETRY:   af controlled vr        LABS:                        9.1    7.08  )-----------( 171      ( 26 Dec 2023 08:52 )             28.8         143  |  101  |  110<H>  ----------------------------<  104<H>  4.1   |  33<H>  |  1.77<H>    Ca    9.1      26 Dec 2023 08:52  Mg     2.7         TPro  7.3  /  Alb  2.6<L>  /  TBili  0.7  /  DBili  x   /  AST  23  /  ALT  19  /  AlkPhos  61                I&O's Summary    25 Dec 2023 07:01  -  26 Dec 2023 07:00  --------------------------------------------------------  IN: 0 mL / OUT: 400 mL / NET: -400 mL                          < end of copied text >      ECHO:    < from: TTE Echo Limited or F/U (23 @ 08:05) >  ACC: [id number] EXAM:  ECHO TTE WO CON FU [facility] [address]                          PROCEDURE DATE:  2023          INTERPRETATION:  TRANSTHORACIC ECHOCARDIOGRAM REPORT        Patient Name:   RAD SINGLETON Patient Location: [address] [place] [address] Rec #:  [id number]            Accession #:      [id number]  Account #:      [id number]             Height:           66.0 in 167.6 cm  YOB: 1934          Weight:           155.0 lb 70.30 kg  Patient Age:    89 years           BSA:              1.79 m²  Patient Gender: M                  BP:               108/55 mmHg      Date of Exam:        2023 8:05:00 AM  Sonographer:         Michael Beckwith  Referring Physician: BRITTANY MATHIAS    Procedure:     Follow up or Limited Echocardiogram.  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        2D AND M-MODE MEASUREMENTS (normal ranges within parentheses):  Left Ventricle:                  Normal   Aorta/Left Atrium:            Normal  IVSd (2D):              1.34 cm (0.7-1.1) Aortic Root (2D):  3.49 cm   (2.4-3.7)  LVPWd (2D):             1.32 cm (0.7-1.1) Left Atrium (2D):  5.17 cm   (1.9-4.0)  LVIDd (2D):             4.38 cm (3.4-5.7)  LVIDs (2D):        3.04 cm  LV FS (2D):             30.5 %   (>25%)  LV EF (2D):              58 %    (>55%)  Relative Wall Thickness  0.60    (<0.42)    LV SYSTOLIC FUNCTION BY 2D PLANIMETRY (MOD):  EF-A4C View: 60.5 % EF-A2C View: 60.9 % EF-Biplane: 60 %    LV DIASTOLIC FUNCTION:  MV Peak E: 2.02 m/s Decel Time: 646 msec  MV Peak A: 0.49 m/s  E/A Ratio: 4.13    SPECTRAL DOPPLER ANALYSIS (where applicable):  Mitral Valve:  MV Mean Grad: 8.1 mmHg MV P1/2 Time: 187.29 msec                         MV Area, PHT: 1.17 cm²    Aortic Valve: AoV Max Ron: 1.48 m/s AoV Peak P.8 mmHg AoV Mean P.3 mmHg    LVOT Vmax: 0.79 m/s LVOT VTI: 0.175 m LVOT Diameter: 2.27 cm    AoV Area, Vmax: 2.16 cm² AoV Area, VTI: 2.21 cm² AoV Area, Vmn: 2.03 cm²  Ao VTI: 0.322  Aortic Insufficiency:  AI Half-time:  499 msec  AI Decel Rate: 2.02 m/s²    Tricuspid Valve and PA/RV Systolic Pressure: TR Max Velocity: 2.40 m/s RA   Pressure: 15 mmHg RVSP/PASP: 38.0 mmHg      PHYSICIAN INTERPRETATION:  Left Ventricle: The left ventricular internal cavity size is normal. Left   ventricular wall thickness is mildly increased.  Global LV systolic function was normal. Left ventricular ejection   fraction, by visual estimation, is 50 to 55%.  Right Ventricle: The right ventricle is not well visualized, appears   generally normal in size.  Left Atrium: Left atrial enlargement.  Right Atrium: Right atrial enlargement.  Pericardium: There is no evidence of pericardial effusion. There is a   moderate pleural effusion in both leftand right lateral regions.  Mitral Valve: Moderate thickening and calcification of the anterior and   posterior mitral valve leaflets. There is mild mitral annular   calcification. Mild mitral valve regurgitation is seen. Moderate mitral   valve stenosis (mean gradient 8 mmHg at HR 69 BPM, MVA 1.3 cm^2 by PHT   175 msec).  Tricuspid Valve: Mild-moderate tricuspid regurgitation is visualized.   Estimated pulmonary artery systolic pressure is 38.0 mmHg assuming a   right atrial pressure of 15 mmHg,which is consistent with borderline   pulmonary hypertension.  Aortic Valve: The aortic valve is trileaflet. Sclerotic aortic valve with   normal opening. Mild aortic valve regurgitation is seen. Aortic valve   leaflet calcification. No aortic valve stenosis.  Pulmonic Valve: The pulmonic valve was not well visualized. Trace   pulmonic valve regurgitation.  Aorta: Aortic root measured at Sinus of Valsalva is normal.  Venous: The inferior vena cava was dilated, with respiratory size   variation less than 50%.      Summary:   1. The heart rhythm is irregular throughout the study.   2. Normal global left ventricular systolic function.   3. Left ventricular ejection fraction, by visual estimation, is 50 to   55%.   4. Mildly increased LV wall thickness.   5. Normal left ventricular internal cavity size.   6. The right ventricle is not well visualized, appears generally normal   in size.   7. Left atrial enlargement.   8. Right atrial enlargement.   9. Mild mitral annular calcification.  10. Moderate thickening and calcification of the anterior and posterior   mitral valve leaflets.  11. Moderate mitral valve stenosis (mean gradient 8 mmHg at HR 69 BPM,   MVA 1.3 cm^2 by  msec).  12. Mild mitral valve regurgitation.  13. Mild-moderate tricuspid regurgitation.  14. Aortic valve leaflet calcification. No aortic valve stenosis.  15. Sclerotic aortic valve with normal opening.  16. Mild aortic regurgitation.  17. Estimated pulmonary artery systolic pressure is 38.0 mmHg assuming a   right atrial pressure of 15 mmHg, which is consistent with borderline   pulmonary hypertension.  18. Moderate pleural effusion in both left and right lateral regions.  19. There is no evidence of pericardial effusion.    [id number] Kevin Hong MD Electronically signed on   2023 at 12:57:57 PM        *** Final ***    < end of copied text >         Follow up for :  chf ashd af    Interval history: awaiting thoracentesis results. pt appears comfortable. on room air. sinus 60-70s. mild improvement in l/e    PMH  Afib    Congestive heart failure (CHF)    CVA (cerebral vascular accident)    Hypertension, unspecified type    Chronic obstructive pulmonary disease (COPD)        MEDICATIONS  (STANDING):  allopurinol 100 milliGRAM(s) Oral daily  apixaban 2.5 milliGRAM(s) Oral every 12 hours  aspirin enteric coated 81 milliGRAM(s) Oral daily  atorvastatin 40 milliGRAM(s) Oral at bedtime  bacitracin   Ointment 1 Application(s) Topical daily  budesonide 160 MICROgram(s)/formoterol 4.5 MICROgram(s) Inhaler 2 Puff(s) Inhalation two times a day  furosemide   Injectable 60 milliGRAM(s) IV Push once  melatonin 3 milliGRAM(s) Oral at bedtime  metoprolol succinate ER 50 milliGRAM(s) Oral daily  pantoprazole    Tablet 40 milliGRAM(s) Oral before breakfast  polyethylene glycol 3350 17 Gram(s) Oral two times a day  senna 2 Tablet(s) Oral at bedtime  tiotropium 2.5 MICROgram(s) Inhaler 2 Puff(s) Inhalation daily    MEDICATIONS  (PRN):  acetaminophen     Tablet .. 650 milliGRAM(s) Oral every 6 hours PRN Temp greater or equal to 38C (100.4F), Mild Pain (1 - 3)  albuterol    90 MICROgram(s) HFA Inhaler 2 Puff(s) Inhalation every 6 hours PRN Shortness of Breath and/or Wheezing        PHYSICAL EXAM:  Vital Signs Last 24 Hrs  T(C): 36.4 (26 Dec 2023 05:00), Max: 36.8 (25 Dec 2023 20:46)  T(F): 97.6 (26 Dec 2023 05:00), Max: 98.2 (25 Dec 2023 20:46)  HR: 74 (26 Dec 2023 05:00) (74 - 103)  BP: 134/56 (26 Dec 2023 05:00) (127/53 - 158/69)  BP(mean): --  RR: 17 (26 Dec 2023 05:00) (17 - 18)  SpO2: 94% (26 Dec 2023 05:00) (94% - 96%)    Parameters below as of 26 Dec 2023 05:00  Patient On (Oxygen Delivery Method): room air        GENERAL: NAD, well-groomed, well-developed  HEAD:  Atraumatic, Normocephalic  EYES:  conjunctiva and sclera clear  ENT: Moist mucous membranes,  NECK: Supple, No JVD, no bruits  CHEST/LUNG:  dullness left, few rales r base  HEART: No murmurs, rubs, or gallops PMI non displaced.  ABDOMEN: Soft, Nontender, Nondistended; Bowel sounds present  EXTREMITIES:   1-2+ edema  NERVOUS SYSTEM:  Alert       TELEMETRY:   af controlled vr        LABS:                        9.1    7.08  )-----------( 171      ( 26 Dec 2023 08:52 )             28.8         143  |  101  |  110<H>  ----------------------------<  104<H>  4.1   |  33<H>  |  1.77<H>    Ca    9.1      26 Dec 2023 08:52  Mg     2.7         TPro  7.3  /  Alb  2.6<L>  /  TBili  0.7  /  DBili  x   /  AST  23  /  ALT  19  /  AlkPhos  61                I&O's Summary    25 Dec 2023 07:01  -  26 Dec 2023 07:00  --------------------------------------------------------  IN: 0 mL / OUT: 400 mL / NET: -400 mL                          < end of copied text >      ECHO:    < from: TTE Echo Limited or F/U (23 @ 08:05) >  ACC: [id number] EXAM:  ECHO TTE WO CON FU [facility] [id number]                          PROCEDURE DATE:  2023          INTERPRETATION:  TRANSTHORACIC ECHOCARDIOGRAM REPORT        Patient Name:   RAD SINGLETON Patient Location: [address] [place] [address] Rec #:  [id number]            Accession #:      [id number]  Account #:      [id number]             Height:           66.0 in 167.6 cm  YOB: 1934          Weight:           155.0 lb 70.30 kg  Patient Age:    89 years           BSA:              1.79 m²  Patient Gender: M                  BP:               108/55 mmHg      Date of Exam:        2023 8:05:00 AM  Sonographer:         Michael Beckwith  Referring Physician: BRITTANY MATHIAS    Procedure:     Follow up or Limited Echocardiogram.  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        2D AND M-MODE MEASUREMENTS (normal ranges within parentheses):  Left Ventricle:                  Normal   Aorta/Left Atrium:            Normal  IVSd (2D):              1.34 cm (0.7-1.1) Aortic Root (2D):  3.49 cm   (2.4-3.7)  LVPWd (2D):             1.32 cm (0.7-1.1) Left Atrium (2D):  5.17 cm   (1.9-4.0)  LVIDd (2D):             4.38 cm (3.4-5.7)  LVIDs (2D):        3.04 cm  LV FS (2D):             30.5 %   (>25%)  LV EF (2D):              58 %    (>55%)  Relative Wall Thickness  0.60    (<0.42)    LV SYSTOLIC FUNCTION BY 2D PLANIMETRY (MOD):  EF-A4C View: 60.5 % EF-A2C View: 60.9 % EF-Biplane: 60 %    LV DIASTOLIC FUNCTION:  MV Peak E: 2.02 m/s Decel Time: 646 msec  MV Peak A: 0.49 m/s  E/A Ratio: 4.13    SPECTRAL DOPPLER ANALYSIS (where applicable):  Mitral Valve:  MV Mean Grad: 8.1 mmHg MV P1/2 Time: 187.29 msec                         MV Area, PHT: 1.17 cm²    Aortic Valve: AoV Max Ron: 1.48 m/s AoV Peak P.8 mmHg AoV Mean P.3 mmHg    LVOT Vmax: 0.79 m/s LVOT VTI: 0.175 m LVOT Diameter: 2.27 cm    AoV Area, Vmax: 2.16 cm² AoV Area, VTI: 2.21 cm² AoV Area, Vmn: 2.03 cm²  Ao VTI: 0.322  Aortic Insufficiency:  AI Half-time:  499 msec  AI Decel Rate: 2.02 m/s²    Tricuspid Valve and PA/RV Systolic Pressure: TR Max Velocity: 2.40 m/s RA   Pressure: 15 mmHg RVSP/PASP: 38.0 mmHg      PHYSICIAN INTERPRETATION:  Left Ventricle: The left ventricular internal cavity size is normal. Left   ventricular wall thickness is mildly increased.  Global LV systolic function was normal. Left ventricular ejection   fraction, by visual estimation, is 50 to 55%.  Right Ventricle: The right ventricle is not well visualized, appears   generally normal in size.  Left Atrium: Left atrial enlargement.  Right Atrium: Right atrial enlargement.  Pericardium: There is no evidence of pericardial effusion. There is a   moderate pleural effusion in both leftand right lateral regions.  Mitral Valve: Moderate thickening and calcification of the anterior and   posterior mitral valve leaflets. There is mild mitral annular   calcification. Mild mitral valve regurgitation is seen. Moderate mitral   valve stenosis (mean gradient 8 mmHg at HR 69 BPM, MVA 1.3 cm^2 by PHT   175 msec).  Tricuspid Valve: Mild-moderate tricuspid regurgitation is visualized.   Estimated pulmonary artery systolic pressure is 38.0 mmHg assuming a   right atrial pressure of 15 mmHg,which is consistent with borderline   pulmonary hypertension.  Aortic Valve: The aortic valve is trileaflet. Sclerotic aortic valve with   normal opening. Mild aortic valve regurgitation is seen. Aortic valve   leaflet calcification. No aortic valve stenosis.  Pulmonic Valve: The pulmonic valve was not well visualized. Trace   pulmonic valve regurgitation.  Aorta: Aortic root measured at Sinus of Valsalva is normal.  Venous: The inferior vena cava was dilated, with respiratory size   variation less than 50%.      Summary:   1. The heart rhythm is irregular throughout the study.   2. Normal global left ventricular systolic function.   3. Left ventricular ejection fraction, by visual estimation, is 50 to   55%.   4. Mildly increased LV wall thickness.   5. Normal left ventricular internal cavity size.   6. The right ventricle is not well visualized, appears generally normal   in size.   7. Left atrial enlargement.   8. Right atrial enlargement.   9. Mild mitral annular calcification.  10. Moderate thickening and calcification of the anterior and posterior   mitral valve leaflets.  11. Moderate mitral valve stenosis (mean gradient 8 mmHg at HR 69 BPM,   MVA 1.3 cm^2 by  msec).  12. Mild mitral valve regurgitation.  13. Mild-moderate tricuspid regurgitation.  14. Aortic valve leaflet calcification. No aortic valve stenosis.  15. Sclerotic aortic valve with normal opening.  16. Mild aortic regurgitation.  17. Estimated pulmonary artery systolic pressure is 38.0 mmHg assuming a   right atrial pressure of 15 mmHg, which is consistent with borderline   pulmonary hypertension.  18. Moderate pleural effusion in both left and right lateral regions.  19. There is no evidence of pericardial effusion.    [id number] Kevin Hong MD Electronically signed on   2023 at 12:57:57 PM        *** Final ***    < end of copied text >

## 2023-12-27 NOTE — PROGRESS NOTE ADULT - PROBLEM SELECTOR PLAN 5
-not in exacerbation  -target SpO2 88-92%. Avoid hyperoxygenation   -c/w spiriva, Symbicort and albuterol PRN  - pulm recommendations appreciated

## 2023-12-27 NOTE — PROGRESS NOTE ADULT - NUTRITIONAL ASSESSMENT
This patient has been assessed with a concern for Malnutrition and has been determined to have a diagnosis/diagnoses of Severe protein-calorie malnutrition.    This patient is being managed with:   Diet Regular-  1000mL Fluid Restriction ([id number])  Low Sodium  Piero(7 Gm Arginine/7 Gm Glut/1.2 Gm HMB     Qty per Day:  2  Supplement Feeding Modality:  Oral  Ensure Plus High Protein Cans or Servings Per Day:  1       Frequency:  Daily  Entered: Dec 19 2023 11:11AM   This patient has been assessed with a concern for Malnutrition and has been determined to have a diagnosis/diagnoses of Severe protein-calorie malnutrition.    This patient is being managed with:   Diet Regular-  1000mL Fluid Restriction ([id number])  Low Sodium  Piero(7 Gm Arginine/7 Gm Glut/1.2 Gm HMB     Qty per Day:  2  Supplement Feeding Modality:  Oral  Ensure Plus High Protein Cans or Servings Per Day:  1       Frequency:  Daily  Entered: Dec 19 2023 11:11AM

## 2023-12-27 NOTE — PROGRESS NOTE ADULT - PROBLEM SELECTOR PLAN 1
-on admission patient clinically overloaded with significant LE edema b/l, left sided effusion and elevated pbnp  -TTE with HFpEF, suspect non compliance with meds and diet at home. Need to obtain collateral from family, patient is poor historian  -Home regimen is torsemide 100 daily per PCP Dr. Rubio    -strict I/os, daily weights, fluid restriction and low salt diet  -monitor on tele  -cardio recs appreciated: If renal function stabilizes then may consider SGLT2-I this admission  -improving volume status; transition to po lasix

## 2023-12-27 NOTE — PROGRESS NOTE ADULT - ASSESSMENT
Physical Examination:  GENERAL:               Alert, Oriented, No acute distress.    HEENT:                   No JVD, Moist MM, fresh blood in left nare  PULM:                     Bilateral air entry diminished on left,  No Rales, No Rhonchi, No Wheezing  CVS:                         S1, S2,  +murmur  ABD:                        Soft, nondistended, nontender, normoactive bowel sounds,   EXT:                         ++edema with blisters , nontender, No Cyanosis or Clubbing   NEURO:                  Alert, oriented, interactive, nonfocal, follows commands  PSYC:                      Calm, + Insight.      Assessment  ERV  Dyspnea suspect due to acute on chronic Diastolic CHF and Chronic Left pleural effusions  s/p thoracentesis 11/24/23 and 12/19/2023    Exudative and 12/19- bloody  Afib on a/c  Cigar smoker, at risk for copd.   Worsening renal function on CKD    Plan  pleural effusion recurrent - appears exudative and  bloody effusion  , now cytology negative  will need pleuroscopy and thoracic eval possible pleurex  d/w Dr. Gilliland, states patient pending transfer to [facility] as no thoracic available here at this time.    monitor on room air  out of bed as tolerated  optimize cardiac meds as per Cardio  Rest of care as per primary team.    D/w Dr. Gilliland   Physical Examination:  GENERAL:               Alert, Oriented, No acute distress.    HEENT:                   No JVD, Moist MM, fresh blood in left nare  PULM:                     Bilateral air entry diminished on left,  No Rales, No Rhonchi, No Wheezing  CVS:                         S1, S2,  +murmur  ABD:                        Soft, nondistended, nontender, normoactive bowel sounds,   EXT:                         ++edema with blisters , nontender, No Cyanosis or Clubbing   NEURO:                  Alert, oriented, interactive, nonfocal, follows commands  PSYC:                      Calm, + Insight.      Assessment  ERV  Dyspnea suspect due to acute on chronic Diastolic CHF and Chronic Left pleural effusions  s/p thoracentesis 11/24/23 and 12/19/2023    Exudative and 12/19- bloody  Afib on a/c  Cigar smoker, at risk for copd.   Worsening renal function on CKD    Plan  pleural effusion recurrent - appears exudative and  bloody effusion  , now cytology negative  will need pleuroscopy and thoracic eval possible pleurex  d/w Dr. Gilliland, states patient pending transfer to [facility] as no thoracic available here at this time.    monitor on room air  out of bed as tolerated  optimize cardiac meds as per Cardio  Rest of care as per primary team.    D/w Dr. Gilliland

## 2023-12-27 NOTE — PHARMACOTHERAPY INTERVENTION NOTE - COMMENTS
patient does not have prescription insurance coverage therefore won't be able to afford farxiga. Dr. Gilliland aware. 
patient does not have prescription insurance. patient's daughter is aware of the cost of eliquis and is willing to pay out of pocket and/or obtain samples from MD office.

## 2023-12-27 NOTE — PROGRESS NOTE ADULT - ASSESSMENT
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 Palliative care consulted due to multiple readmissions and CHF.    Dyspnea  - 2/2 Acute on chronic diastolic congestive heart failure  - s/p thoracentesis; currently being evaluated with CTS for possible pleurex placement; personally reviewed prior CXRs for comparison of pleural effusion build up  - diuresis per cardio/pulm; pt currently on RA    BLE pain  - pt with blisters and bullous formation to legs  - wound care consult per primary team  - Tylenol PRN; diuresis per cardio/pulm    Acute on chronic diastolic congestive heart failure  - Echo reviewed with EF 50 to 55%. Moderate mitral valve stenosis , borderline pulmonary hypertension.  - cardio/pulm following    Encounter for palliative care  - Spoke to pt's close family friend whom he calls a daughter Antonietta at bedside and discussed his clinical status. See [facility] note above. Pt remains FULL CODE  -  Spoke with resident physician for plan for [facility] transfer for CTS eval.  Supportive care given.  - HCP form filled out and placed in chart.  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 Palliative care consulted due to multiple readmissions and CHF.    Dyspnea  - 2/2 Acute on chronic diastolic congestive heart failure  - s/p thoracentesis; currently being evaluated with CTS for possible pleurex placement; personally reviewed prior CXRs for comparison of pleural effusion build up  - diuresis per cardio/pulm; pt currently on RA    BLE pain  - pt with blisters and bullous formation to legs  - wound care consult per primary team  - Tylenol PRN; diuresis per cardio/pulm    Acute on chronic diastolic congestive heart failure  - Echo reviewed with EF 50 to 55%. Moderate mitral valve stenosis , borderline pulmonary hypertension.  - cardio/pulm following    Encounter for palliative care  - Spoke to pt's close family friend whom he calls a daughter Antonietta at bedside and discussed his clinical status. See [facility] note above. Pt remains FULL CODE  -  Spoke with resident physician for plan for [facility] transfer for CTS eval.  Supportive care given.  - HCP form filled out and placed in chart.

## 2023-12-27 NOTE — PROGRESS NOTE ADULT - SUBJECTIVE AND OBJECTIVE BOX
Follow-up Pulmonary Progress Note  Chief Complaint : Urticaria    Patient seen and examined   on room air  less LE edema  no cp, sob, palp, n/v  discussed cytology now negative, need pleuroscopy       Allergies :No Known Allergies      PAST MEDICAL & SURGICAL HISTORY:  Afib    Congestive heart failure (CHF)    CVA (cerebral vascular accident)    Hypertension, unspecified type    Chronic obstructive pulmonary disease (COPD)    No significant past surgical history        Medications:  MEDICATIONS  (STANDING):  allopurinol 100 milliGRAM(s) Oral daily  apixaban 2.5 milliGRAM(s) Oral every 12 hours  aspirin enteric coated 81 milliGRAM(s) Oral daily  atorvastatin 40 milliGRAM(s) Oral at bedtime  bacitracin   Ointment 1 Application(s) Topical daily  budesonide 160 MICROgram(s)/formoterol 4.5 MICROgram(s) Inhaler 2 Puff(s) Inhalation two times a day  melatonin 3 milliGRAM(s) Oral at bedtime  metoprolol succinate ER 50 milliGRAM(s) Oral daily  pantoprazole    Tablet 40 milliGRAM(s) Oral before breakfast  polyethylene glycol 3350 17 Gram(s) Oral two times a day  senna 2 Tablet(s) Oral at bedtime  tiotropium 2.5 MICROgram(s) Inhaler 2 Puff(s) Inhalation daily    MEDICATIONS  (PRN):  acetaminophen     Tablet .. 650 milliGRAM(s) Oral every 6 hours PRN Temp greater or equal to 38C (100.4F), Mild Pain (1 - 3)  albuterol    90 MICROgram(s) HFA Inhaler 2 Puff(s) Inhalation every 6 hours PRN Shortness of Breath and/or Wheezing      Antibiotics History      Heme Medications   apixaban 2.5 milliGRAM(s) Oral every 12 hours, 12-20-23 @ 14:46  aspirin enteric coated 81 milliGRAM(s) Oral daily, 12-18-23 @ 14:40      GI Medications  pantoprazole    Tablet 40 milliGRAM(s) Oral before breakfast, 12-18-23 @ 14:42, Routine  polyethylene glycol 3350 17 Gram(s) Oral two times a day, 12-19-23 @ 08:53,   senna 2 Tablet(s) Oral at bedtime, 12-18-23 @ 14:17, Routine        LABS:                        9.0    7.66  )-----------( 171      ( 27 Dec 2023 05:33 )             28.3     12-27    142  |  101  |  103<H>  ----------------------------<  134<H>  4.3   |  32<H>  |  1.95<H>    Ca    8.9      27 Dec 2023 05:33    TPro  7.1  /  Alb  2.5<L>  /  TBili  0.5  /  DBili  x   /  AST  22  /  ALT  19  /  AlkPhos  62  12-27         CULTURES: (if applicable)    Culture - Fungal, Body Fluid (collected 12-19-23 @ 13:46)  Source: Pleural Fl Pleural Fluid  Preliminary Report (12-27-23 @ 15:03):    No fungus isolated at 1 week.    Culture - Body Fluid with Gram Stain (collected 12-19-23 @ 13:46)  Source: Pleural Fl Pleural Fluid  Gram Stain (12-20-23 @ 02:07):    polymorphonuclear leukocytes seen    No organisms seen    by cytocentrifuge  Final Report (12-24-23 @ 17:18):    No growth      Rapid RVP Result: Detected (12-18-23 @ 10:15)           VITALS:  T(C): 36.4 (12-27-23 @ 12:10), Max: 36.6 (12-26-23 @ 21:18)  T(F): 97.6 (12-27-23 @ 12:10), Max: 97.9 (12-26-23 @ 21:18)  HR: 72 (12-27-23 @ 12:10) (72 - 85)  BP: 114/73 (12-27-23 @ 12:10) (111/61 - 125/63)  BP(mean): --  ABP: --  ABP(mean): --  RR: 15 (12-27-23 @ 12:10) (15 - 18)  SpO2: 96% (12-27-23 @ 12:10) (95% - 98%)  CVP(mm Hg): --  CVP(cm H2O): --    Ins and Outs     12-26-23 @ 07:01  -  12-27-23 @ 07:00  --------------------------------------------------------  IN: 0 mL / OUT: 600 mL / NET: -600 mL                I&O's Detail    26 Dec 2023 07:01  -  27 Dec 2023 07:00  --------------------------------------------------------  IN:  Total IN: 0 mL    OUT:    Voided (mL): 600 mL  Total OUT: 600 mL    Total NET: -600 mL

## 2023-12-27 NOTE — PROGRESS NOTE ADULT - CONVERSATION DETAILS
Discussed pt's overall clinical status with Antonietta and pt at bedside. Pt and Antonietta were interested in having CTS followup for possible pleurex cath for pt's recurrent effusions. Filled out a HCP form at bedside, copy placed in chart. Pt named Antonietta and his daughter Frida as the alternate. Discussed code status and CPR at bedside. Pt did not provide a definite answer and stated "we'll see at that time". Antonietta expressed to me that she understood he would not do well s/p chest compressions/intubation at his age. She shared that the pt was on a vent in the past and trached. The pt shared his son was vented and sick for 10 months before dying. He however did not wish to further discuss CPR at this Time. Supportive care given. Discussed pt's overall clinical status with Antonietta and pt at bedside. Pt and Antonietta were interested in having CTS followup for possible pleurex cath for pt's recurrent effusions. Filled out a HCP form at bedside, copy placed in chart. Pt named Antonietta and his daughter Frida as the alternate. Discussed code status and CPR at bedside. Pt did not provide a definite answer and stated "we'll see at that time". Antnoietta expressed to me that she understood he would not do well s/p chest compressions/intubation at his age. She shared that the pt was on a vent in the past and trached. The pt shared his son was vented and sick for 10 months before dying. He however did not wish to further discuss CPR at this Time. Supportive care given.

## 2023-12-27 NOTE — PROGRESS NOTE ADULT - SUBJECTIVE AND OBJECTIVE BOX
Indication for Geriatrics and Palliative Care Services/INTERVAL HPI: complex symptom management and decision making in the setting of advanced illness     OVERNIGHT EVENTS: no acute overnight events noted  SUBJECTIVE AND OBJECTIVE: Pt seen and examined at bedside this morning. He reports not feeling well today, c/o some SOB with exertion. Denied CP, abd pain, N/V, or any bleeding episodes.    Allergies    No Known Allergies    Intolerances    MEDICATIONS  (STANDING):  allopurinol 100 milliGRAM(s) Oral daily  apixaban 2.5 milliGRAM(s) Oral every 12 hours  aspirin enteric coated 81 milliGRAM(s) Oral daily  atorvastatin 40 milliGRAM(s) Oral at bedtime  bacitracin   Ointment 1 Application(s) Topical daily  budesonide 160 MICROgram(s)/formoterol 4.5 MICROgram(s) Inhaler 2 Puff(s) Inhalation two times a day  melatonin 3 milliGRAM(s) Oral at bedtime  metoprolol succinate ER 50 milliGRAM(s) Oral daily  pantoprazole    Tablet 40 milliGRAM(s) Oral before breakfast  polyethylene glycol 3350 17 Gram(s) Oral two times a day  senna 2 Tablet(s) Oral at bedtime  tiotropium 2.5 MICROgram(s) Inhaler 2 Puff(s) Inhalation daily    MEDICATIONS  (PRN):  acetaminophen     Tablet .. 650 milliGRAM(s) Oral every 6 hours PRN Temp greater or equal to 38C (100.4F), Mild Pain (1 - 3)  albuterol    90 MICROgram(s) HFA Inhaler 2 Puff(s) Inhalation every 6 hours PRN Shortness of Breath and/or Wheezing      ITEMS UNCHECKED ARE NOT PRESENT    PRESENT SYMPTOMS: [ ]Unable to self-report   Source if other than patient:  [ ]Family   [ ]Team     Pain:  [ ]yes [ ]no  QOL impact -   Location -                    Aggravating factors -  Quality -  Radiation -  Timing-  Severity (0-10 scale):  Minimal acceptable level (0-10 scale):     Dyspnea:                           [ ]Mild [ ]Moderate [ ]Severe  Anxiety:                             [ ]Mild [ ]Moderate [ ]Severe  Fatigue:                             [ ]Mild [ ]Moderate [ ]Severe  Nausea:                             [ ]Mild [ ]Moderate [ ]Severe  Depressed:  Loss of appetite:              [ ]Mild [ ]Moderate [ ]Severe  Constipation:                    [ ]Mild [ ]Moderate [ ]Severe      Other Symptoms:  [ ]All other review of systems negative     Chaplaincy Referral: [ ] yes [ ] refused [ ] following [ ] Deferred   Palliative Performance Status Version 2:         %      [email or web address]    PHYSICAL EXAM:  Vital Signs Last 24 Hrs  T(C): 36.4 (27 Dec 2023 12:10), Max: 36.7 (26 Dec 2023 13:32)  T(F): 97.6 (27 Dec 2023 12:10), Max: 98 (26 Dec 2023 13:32)  HR: 72 (27 Dec 2023 12:10) (72 - 85)  BP: 114/73 (27 Dec 2023 12:10) (111/61 - 125/63)  BP(mean): --  RR: 15 (27 Dec 2023 12:10) (15 - 18)  SpO2: 96% (27 Dec 2023 12:10) (95% - 98%)    Parameters below as of 27 Dec 2023 12:10  Patient On (Oxygen Delivery Method): room air     I&O's Summary    26 Dec 2023 07:01  -  27 Dec 2023 07:00  --------------------------------------------------------  IN: 0 mL / OUT: 600 mL / NET: -600 mL       GENERAL: [ ]Cachexia    [ ]Alert  [ ]Oriented x   [ ]Lethargic  [ ]Unarousable  [ ]Verbal  [ ]Non-Verbal  Behavioral:   [ ]Anxiety  [ ]Delirium [ ]Agitation [ ]Other  HEENT:  [ ]Normal   [ ]Dry mouth   [ ]ET Tube/Trach  [ ]Oral lesions  PULMONARY:   [ ]Clear [ ]Tachypnea  [ ]Audible excessive secretions   [ ]Rhonchi        [ ]Right [ ]Left [ ]Bilateral  [ ]Crackles        [ ]Right [ ]Left [ ]Bilateral  [ ]Wheezing     [ ]Right [ ]Left [ ]Bilateral  [ ]Diminished BS [ ] Right [ ]Left [ ]Bilateral  CARDIOVASCULAR:    [ ]Regular [ ]Irregular [ ]Tachy  [ ]Abdirashid [ ]Murmur [ ]Other  GASTROINTESTINAL:  [ ]Soft  [ ]Distended   [ ]+BS  [ ]Non tender [ ]Tender  [ ]Other [ ]PEG [ ]OGT/ NGT   Last BM:   GENITOURINARY:  [ ]Normal [ ]Incontinent   [ ]Oliguria/Anuria   [ ]Aguiar  MUSCULOSKELETAL:   [ ]Normal   [ ]Weakness  [ ]Bed/Wheelchair bound [ ]Edema  NEUROLOGIC:   [ ]No focal deficits  [ ] Cognitive impairment  [ ] Dysphagia [ ]Dysarthria [ ] Paresis [ ]Other   SKIN:   [ ]Normal  [ ]Rash  [ ]Other  [ ]Pressure ulcer(s) [ ]y [ ]n present on admission    CRITICAL CARE:  [ ]Shock Present  [ ]Septic [ ]Cardiogenic [ ]Neurologic [ ]Hypovolemic  [ ]Vasopressors [ ]Inotropes  [ ]Respiratory failure present [ ]Mechanical Ventilation [ ]Non-invasive ventilatory support [ ]High-Flow   [ ]Acute  [ ]Chronic [ ]Hypoxic  [ ]Hypercarbic [ ]Other  [ ]Other organ failure     LABS:                        9.0    7.66  )-----------( 171      ( 27 Dec 2023 05:33 )             28.3   12-27    142  |  101  |  103<H>  ----------------------------<  134<H>  4.3   |  32<H>  |  1.95<H>    Ca    8.9      27 Dec 2023 05:33    TPro  7.1  /  Alb  2.5<L>  /  TBili  0.5  /  DBili  x   /  AST  22  /  ALT  19  /  AlkPhos  62  12-27      Urinalysis Basic - ( 27 Dec 2023 05:33 )    Color: x / Appearance: x / SG: x / pH: x  Gluc: 134 mg/dL / Ketone: x  / Bili: x / Urobili: x   Blood: x / Protein: x / Nitrite: x   Leuk Esterase: x / RBC: x / WBC x   Sq Epi: x / Non Sq Epi: x / Bacteria: x      RADIOLOGY & ADDITIONAL STUDIES:    Protein Calorie Malnutrition Present: [ ]mild [ ]moderate [ ]severe [ ]underweight [ ]morbid obesity  https://www.andeal.org/vault/2440/web/files/ONC/Table_Clinical%20Characteristics%20to%20Document%20Malnutrition-White%20JV%20et%20al%202012.pdf    Height (cm): 167.6 (12-18-23 @ 09:52), 167.6 (11-22-23 @ 17:35), 167.6 (10-15-23 @ 21:52)  Weight (kg): 70.3 (12-18-23 @ 09:52), 75.75 (11-25-23 @ 04:49), 72.6 (10-15-23 @ 21:52)  BMI (kg/m2): 25 (12-18-23 @ 09:52), 27 (11-25-23 @ 04:49), 25.8 (11-22-23 @ 17:35)    [ ]PPSV2 < or = 30%  [ ]significant weight loss [ ]poor nutritional intake [ ]anasarca[ ]Artificial Nutrition    Other REFERRALS:  [ ]Hospice  [ ]Child Life  [ ]Social Work  [ ]Case management [ ]Holistic Therapy  Indication for Geriatrics and Palliative Care Services/INTERVAL HPI: complex symptom management and decision making in the setting of advanced illness     OVERNIGHT EVENTS: no acute overnight events noted  SUBJECTIVE AND OBJECTIVE: Pt seen and examined at bedside this morning. He reports not feeling well today, c/o some SOB with exertion. Denied CP, abd pain, N/V, or any bleeding episodes.    Allergies    No Known Allergies    Intolerances    MEDICATIONS  (STANDING):  allopurinol 100 milliGRAM(s) Oral daily  apixaban 2.5 milliGRAM(s) Oral every 12 hours  aspirin enteric coated 81 milliGRAM(s) Oral daily  atorvastatin 40 milliGRAM(s) Oral at bedtime  bacitracin   Ointment 1 Application(s) Topical daily  budesonide 160 MICROgram(s)/formoterol 4.5 MICROgram(s) Inhaler 2 Puff(s) Inhalation two times a day  melatonin 3 milliGRAM(s) Oral at bedtime  metoprolol succinate ER 50 milliGRAM(s) Oral daily  pantoprazole    Tablet 40 milliGRAM(s) Oral before breakfast  polyethylene glycol 3350 17 Gram(s) Oral two times a day  senna 2 Tablet(s) Oral at bedtime  tiotropium 2.5 MICROgram(s) Inhaler 2 Puff(s) Inhalation daily    MEDICATIONS  (PRN):  acetaminophen     Tablet .. 650 milliGRAM(s) Oral every 6 hours PRN Temp greater or equal to 38C (100.4F), Mild Pain (1 - 3)  albuterol    90 MICROgram(s) HFA Inhaler 2 Puff(s) Inhalation every 6 hours PRN Shortness of Breath and/or Wheezing      ITEMS UNCHECKED ARE NOT PRESENT    PRESENT SYMPTOMS: [ ]Unable to self-report   Source if other than patient:  [ ]Family   [ ]Team     Pain:  [ ]yes [ ]no  QOL impact -   Location -                    Aggravating factors -  Quality -  Radiation -  Timing-  Severity (0-10 scale):  Minimal acceptable level (0-10 scale):     Dyspnea:                           [ ]Mild [ ]Moderate [ ]Severe  Anxiety:                             [ ]Mild [ ]Moderate [ ]Severe  Fatigue:                             [ ]Mild [ ]Moderate [ ]Severe  Nausea:                             [ ]Mild [ ]Moderate [ ]Severe  Depressed:  Loss of appetite:              [ ]Mild [ ]Moderate [ ]Severe  Constipation:                    [ ]Mild [ ]Moderate [ ]Severe      Other Symptoms:  [ ]All other review of systems negative     Chaplaincy Referral: [ ] yes [ ] refused [ ] following [ ] Deferred   Palliative Performance Status Version 2:         %      [email or web address]/[email or web address]    PHYSICAL EXAM:  Vital Signs Last 24 Hrs  T(C): 36.4 (27 Dec 2023 12:10), Max: 36.7 (26 Dec 2023 13:32)  T(F): 97.6 (27 Dec 2023 12:10), Max: 98 (26 Dec 2023 13:32)  HR: 72 (27 Dec 2023 12:10) (72 - 85)  BP: 114/73 (27 Dec 2023 12:10) (111/61 - 125/63)  BP(mean): --  RR: 15 (27 Dec 2023 12:10) (15 - 18)  SpO2: 96% (27 Dec 2023 12:10) (95% - 98%)    Parameters below as of 27 Dec 2023 12:10  Patient On (Oxygen Delivery Method): room air     I&O's Summary    26 Dec 2023 07:01  -  27 Dec 2023 07:00  --------------------------------------------------------  IN: 0 mL / OUT: 600 mL / NET: -600 mL       GENERAL: [ ]Cachexia    [ ]Alert  [ ]Oriented x   [ ]Lethargic  [ ]Unarousable  [ ]Verbal  [ ]Non-Verbal  Behavioral:   [ ]Anxiety  [ ]Delirium [ ]Agitation [ ]Other  HEENT:  [ ]Normal   [ ]Dry mouth   [ ]ET Tube/Trach  [ ]Oral lesions  PULMONARY:   [ ]Clear [ ]Tachypnea  [ ]Audible excessive secretions   [ ]Rhonchi        [ ]Right [ ]Left [ ]Bilateral  [ ]Crackles        [ ]Right [ ]Left [ ]Bilateral  [ ]Wheezing     [ ]Right [ ]Left [ ]Bilateral  [ ]Diminished BS [ ] Right [ ]Left [ ]Bilateral  CARDIOVASCULAR:    [ ]Regular [ ]Irregular [ ]Tachy  [ ]Abdirashid [ ]Murmur [ ]Other  GASTROINTESTINAL:  [ ]Soft  [ ]Distended   [ ]+BS  [ ]Non tender [ ]Tender  [ ]Other [ ]PEG [ ]OGT/ NGT   Last BM:   GENITOURINARY:  [ ]Normal [ ]Incontinent   [ ]Oliguria/Anuria   [ ]Aguiar  MUSCULOSKELETAL:   [ ]Normal   [ ]Weakness  [ ]Bed/Wheelchair bound [ ]Edema  NEUROLOGIC:   [ ]No focal deficits  [ ] Cognitive impairment  [ ] Dysphagia [ ]Dysarthria [ ] Paresis [ ]Other   SKIN:   [ ]Normal  [ ]Rash  [ ]Other  [ ]Pressure ulcer(s) [ ]y [ ]n present on admission    CRITICAL CARE:  [ ]Shock Present  [ ]Septic [ ]Cardiogenic [ ]Neurologic [ ]Hypovolemic  [ ]Vasopressors [ ]Inotropes  [ ]Respiratory failure present [ ]Mechanical Ventilation [ ]Non-invasive ventilatory support [ ]High-Flow   [ ]Acute  [ ]Chronic [ ]Hypoxic  [ ]Hypercarbic [ ]Other  [ ]Other organ failure     LABS:                        9.0    7.66  )-----------( 171      ( 27 Dec 2023 05:33 )             28.3   12-27    142  |  101  |  103<H>  ----------------------------<  134<H>  4.3   |  32<H>  |  1.95<H>    Ca    8.9      27 Dec 2023 05:33    TPro  7.1  /  Alb  2.5<L>  /  TBili  0.5  /  DBili  x   /  AST  22  /  ALT  19  /  AlkPhos  62  12-27      Urinalysis Basic - ( 27 Dec 2023 05:33 )    Color: x / Appearance: x / SG: x / pH: x  Gluc: 134 mg/dL / Ketone: x  / Bili: x / Urobili: x   Blood: x / Protein: x / Nitrite: x   Leuk Esterase: x / RBC: x / WBC x   Sq Epi: x / Non Sq Epi: x / Bacteria: x      RADIOLOGY & ADDITIONAL STUDIES:    Protein Calorie Malnutrition Present: [ ]mild [ ]moderate [ ]severe [ ]underweight [ ]morbid obesity  https://www.andeal.org/vault/2440/web/files/ONC/Table_Clinical%20Characteristics%20to%20Document%20Malnutrition-White%20JV%20et%20al%202012.pdf    Height (cm): 167.6 (12-18-23 @ 09:52), 167.6 (11-22-23 @ 17:35), 167.6 (10-15-23 @ 21:52)  Weight (kg): 70.3 (12-18-23 @ 09:52), 75.75 (11-25-23 @ 04:49), 72.6 (10-15-23 @ 21:52)  BMI (kg/m2): 25 (12-18-23 @ 09:52), 27 (11-25-23 @ 04:49), 25.8 (11-22-23 @ 17:35)    [ ]PPSV2 < or = 30%  [ ]significant weight loss [ ]poor nutritional intake [ ]anasarca[ ]Artificial Nutrition    Other REFERRALS:  [ ]Hospice  [ ]Child Life  [ ]Social Work  [ ]Case management [ ]Holistic Therapy  Indication for Geriatrics and Palliative Care Services/INTERVAL HPI: complex symptom management and decision making in the setting of advanced illness     OVERNIGHT EVENTS: no acute overnight events noted  SUBJECTIVE AND OBJECTIVE: Pt seen and examined at bedside this morning. He reports not feeling well today, c/o some SOB with exertion. Denied CP, abd pain, N/V, or any bleeding episodes.    Allergies  No Known Allergies    Intolerances    MEDICATIONS  (STANDING):  allopurinol 100 milliGRAM(s) Oral daily  apixaban 2.5 milliGRAM(s) Oral every 12 hours  aspirin enteric coated 81 milliGRAM(s) Oral daily  atorvastatin 40 milliGRAM(s) Oral at bedtime  bacitracin   Ointment 1 Application(s) Topical daily  budesonide 160 MICROgram(s)/formoterol 4.5 MICROgram(s) Inhaler 2 Puff(s) Inhalation two times a day  melatonin 3 milliGRAM(s) Oral at bedtime  metoprolol succinate ER 50 milliGRAM(s) Oral daily  pantoprazole    Tablet 40 milliGRAM(s) Oral before breakfast  polyethylene glycol 3350 17 Gram(s) Oral two times a day  senna 2 Tablet(s) Oral at bedtime  tiotropium 2.5 MICROgram(s) Inhaler 2 Puff(s) Inhalation daily    MEDICATIONS  (PRN):  acetaminophen     Tablet .. 650 milliGRAM(s) Oral every 6 hours PRN Temp greater or equal to 38C (100.4F), Mild Pain (1 - 3)  albuterol    90 MICROgram(s) HFA Inhaler 2 Puff(s) Inhalation every 6 hours PRN Shortness of Breath and/or Wheezing      ITEMS UNCHECKED ARE NOT PRESENT    PRESENT SYMPTOMS:    Pain:   no  QOL impact -   Location -                    Aggravating factors -  Quality -  Radiation -  Timing-  Severity (0-10 scale):  Minimal acceptable level (0-10 scale):     Dyspnea:                           Mild   Anxiety:                             does not report  Depressed:                     does not report  Fatigue:                             none  Nausea:                            none  Loss of appetite:             none  Constipation:                    none      Other Symptoms:  [X ]All other review of systems negative       Chaplaincy Referral:   Deferred   Palliative Performance Status Version 2:     60    %      [email or web address]/files/[email or web address]_performance_[email or web address]_[email or web address]    PHYSICAL EXAM:  Vital Signs Last 24 Hrs  T(C): 36.4 (27 Dec 2023 12:10), Max: 36.7 (26 Dec 2023 13:32)  T(F): 97.6 (27 Dec 2023 12:10), Max: 98 (26 Dec 2023 13:32)  HR: 72 (27 Dec 2023 12:10) (72 - 85)  BP: 114/73 (27 Dec 2023 12:10) (111/61 - 125/63)  BP(mean): --  RR: 15 (27 Dec 2023 12:10) (15 - 18)  SpO2: 96% (27 Dec 2023 12:10) (95% - 98%)    Parameters below as of 27 Dec 2023 12:10  Patient On (Oxygen Delivery Method): room air     I&O's Summary    26 Dec 2023 07:01  -  27 Dec 2023 07:00  --------------------------------------------------------  IN: 0 mL / OUT: 600 mL / NET: -600 mL       GENERAL: disheveled elderly male sitting in chair in NAD  HEENT: NC/AT, MMM  PULMONARY: decreased breath sounds to L side, CTA on R side  CARDIOVASCULAR:  irregularly irregular rhythm  GASTROINTESTINAL: soft, nontender  Last BM: 12/20  MUSCULOSKELETAL: moves all extremities; significant edema to BLE - wrapped in ace bandages  NEUROLOGIC: A&Ox3, appropriate affect  SKIN: see nursing assessments for details; bullous formation to RLE, significant discoloration noted    LABS:                        9.0    7.66  )-----------( 171      ( 27 Dec 2023 05:33 )             28.3   12-27    142  |  101  |  103<H>  ----------------------------<  134<H>  4.3   |  32<H>  |  1.95<H>    Ca    8.9      27 Dec 2023 05:33    TPro  7.1  /  Alb  2.5<L>  /  TBili  0.5  /  DBili  x   /  AST  22  /  ALT  19  /  AlkPhos  62  12-27      Urinalysis Basic - ( 27 Dec 2023 05:33 )    Color: x / Appearance: x / SG: x / pH: x  Gluc: 134 mg/dL / Ketone: x  / Bili: x / Urobili: x   Blood: x / Protein: x / Nitrite: x   Leuk Esterase: x / RBC: x / WBC x   Sq Epi: x / Non Sq Epi: x / Bacteria: x      RADIOLOGY & ADDITIONAL STUDIES:    PROCEDURE DATE:  12/23/2023          INTERPRETATION:  AP chest on December 23, 2023 at 10:43 AM.    Heart size difficult to assess.    There is a large left effusion roughly similar to December 21.    IMPRESSION: Persistent large left effusion.    --- End of Report ---            BISMARK ZAMUDIO MD; Attending Radiologist  This document has been electronically signed. Dec 23 2023 12:57PM    Protein Calorie Malnutrition Present: [ ]mild [ ]moderate [ ]severe [ ]underweight [ ]morbid obesity  https://www.andeal.org/vault/2440/web/files/ONC/Table_Clinical%20Characteristics%20to%20Document%20Malnutrition-White%20JV%20et%20al%202012.pdf    Height (cm): 167.6 (12-18-23 @ 09:52), 167.6 (11-22-23 @ 17:35), 167.6 (10-15-23 @ 21:52)  Weight (kg): 70.3 (12-18-23 @ 09:52), 75.75 (11-25-23 @ 04:49), 72.6 (10-15-23 @ 21:52)  BMI (kg/m2): 25 (12-18-23 @ 09:52), 27 (11-25-23 @ 04:49), 25.8 (11-22-23 @ 17:35)    [ ]PPSV2 < or = 30%  [ ]significant weight loss [ ]poor nutritional intake [ ]anasarca[ ]Artificial Nutrition    Other REFERRALS:  [ ]Hospice  [ ]Child Life  [ ]Social Work  [ ]Case management [ ]Holistic Therapy  Indication for Geriatrics and Palliative Care Services/INTERVAL HPI: complex symptom management and decision making in the setting of advanced illness     OVERNIGHT EVENTS: no acute overnight events noted  SUBJECTIVE AND OBJECTIVE: Pt seen and examined at bedside this morning. He reports not feeling well today, c/o some SOB with exertion. Denied CP, abd pain, N/V, or any bleeding episodes.    Allergies  No Known Allergies    Intolerances    MEDICATIONS  (STANDING):  allopurinol 100 milliGRAM(s) Oral daily  apixaban 2.5 milliGRAM(s) Oral every 12 hours  aspirin enteric coated 81 milliGRAM(s) Oral daily  atorvastatin 40 milliGRAM(s) Oral at bedtime  bacitracin   Ointment 1 Application(s) Topical daily  budesonide 160 MICROgram(s)/formoterol 4.5 MICROgram(s) Inhaler 2 Puff(s) Inhalation two times a day  melatonin 3 milliGRAM(s) Oral at bedtime  metoprolol succinate ER 50 milliGRAM(s) Oral daily  pantoprazole    Tablet 40 milliGRAM(s) Oral before breakfast  polyethylene glycol 3350 17 Gram(s) Oral two times a day  senna 2 Tablet(s) Oral at bedtime  tiotropium 2.5 MICROgram(s) Inhaler 2 Puff(s) Inhalation daily    MEDICATIONS  (PRN):  acetaminophen     Tablet .. 650 milliGRAM(s) Oral every 6 hours PRN Temp greater or equal to 38C (100.4F), Mild Pain (1 - 3)  albuterol    90 MICROgram(s) HFA Inhaler 2 Puff(s) Inhalation every 6 hours PRN Shortness of Breath and/or Wheezing      ITEMS UNCHECKED ARE NOT PRESENT    PRESENT SYMPTOMS:    Pain:   no  QOL impact -   Location -                    Aggravating factors -  Quality -  Radiation -  Timing-  Severity (0-10 scale):  Minimal acceptable level (0-10 scale):     Dyspnea:                           Mild   Anxiety:                             does not report  Depressed:                     does not report  Fatigue:                             none  Nausea:                            none  Loss of appetite:             none  Constipation:                    none      Other Symptoms:  [X ]All other review of systems negative       Chaplaincy Referral:   Deferred   Palliative Performance Status Version 2:     60    %      [email or web address]/[email or web address]_performance_[email or web address]_[email or web address]    PHYSICAL EXAM:  Vital Signs Last 24 Hrs  T(C): 36.4 (27 Dec 2023 12:10), Max: 36.7 (26 Dec 2023 13:32)  T(F): 97.6 (27 Dec 2023 12:10), Max: 98 (26 Dec 2023 13:32)  HR: 72 (27 Dec 2023 12:10) (72 - 85)  BP: 114/73 (27 Dec 2023 12:10) (111/61 - 125/63)  BP(mean): --  RR: 15 (27 Dec 2023 12:10) (15 - 18)  SpO2: 96% (27 Dec 2023 12:10) (95% - 98%)    Parameters below as of 27 Dec 2023 12:10  Patient On (Oxygen Delivery Method): room air     I&O's Summary    26 Dec 2023 07:01  -  27 Dec 2023 07:00  --------------------------------------------------------  IN: 0 mL / OUT: 600 mL / NET: -600 mL       GENERAL: disheveled elderly male sitting in chair in NAD  HEENT: NC/AT, MMM  PULMONARY: decreased breath sounds to L side, CTA on R side  CARDIOVASCULAR:  irregularly irregular rhythm  GASTROINTESTINAL: soft, nontender  Last BM: 12/20  MUSCULOSKELETAL: moves all extremities; significant edema to BLE - wrapped in ace bandages  NEUROLOGIC: A&Ox3, appropriate affect  SKIN: see nursing assessments for details; bullous formation to RLE, significant discoloration noted    LABS:                        9.0    7.66  )-----------( 171      ( 27 Dec 2023 05:33 )             28.3   12-27    142  |  101  |  103<H>  ----------------------------<  134<H>  4.3   |  32<H>  |  1.95<H>    Ca    8.9      27 Dec 2023 05:33    TPro  7.1  /  Alb  2.5<L>  /  TBili  0.5  /  DBili  x   /  AST  22  /  ALT  19  /  AlkPhos  62  12-27      Urinalysis Basic - ( 27 Dec 2023 05:33 )    Color: x / Appearance: x / SG: x / pH: x  Gluc: 134 mg/dL / Ketone: x  / Bili: x / Urobili: x   Blood: x / Protein: x / Nitrite: x   Leuk Esterase: x / RBC: x / WBC x   Sq Epi: x / Non Sq Epi: x / Bacteria: x      RADIOLOGY & ADDITIONAL STUDIES:    PROCEDURE DATE:  12/23/2023          INTERPRETATION:  AP chest on December 23, 2023 at 10:43 AM.    Heart size difficult to assess.    There is a large left effusion roughly similar to December 21.    IMPRESSION: Persistent large left effusion.    --- End of Report ---            BISMARK ZAMUDIO MD; Attending Radiologist  This document has been electronically signed. Dec 23 2023 12:57PM    Protein Calorie Malnutrition Present: [ ]mild [ ]moderate [ ]severe [ ]underweight [ ]morbid obesity  https://www.andeal.org/vault/2440/web/files/ONC/Table_Clinical%20Characteristics%20to%20Document%20Malnutrition-White%20JV%20et%20al%202012.pdf    Height (cm): 167.6 (12-18-23 @ 09:52), 167.6 (11-22-23 @ 17:35), 167.6 (10-15-23 @ 21:52)  Weight (kg): 70.3 (12-18-23 @ 09:52), 75.75 (11-25-23 @ 04:49), 72.6 (10-15-23 @ 21:52)  BMI (kg/m2): 25 (12-18-23 @ 09:52), 27 (11-25-23 @ 04:49), 25.8 (11-22-23 @ 17:35)    [ ]PPSV2 < or = 30%  [ ]significant weight loss [ ]poor nutritional intake [ ]anasarca[ ]Artificial Nutrition    Other REFERRALS:  [ ]Hospice  [ ]Child Life  [ ]Social Work  [ ]Case management [ ]Holistic Therapy

## 2023-12-27 NOTE — PROVIDER CONTACT NOTE (MEDICATION) - SITUATION
Patient complains of rash to b/l LE, previously received bacitracin to affected area. Contacted Dr. Alejandro regarding new order for re-application to the patients b/l lower leg.

## 2023-12-27 NOTE — PROGRESS NOTE ADULT - PROBLEM SELECTOR PLAN 4
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

## 2023-12-27 NOTE — PROGRESS NOTE ADULT - ATTENDING COMMENTS
d/w Dr. Hernandez    case presented to [facility] - accepted under hospitalist with thoracic consult     renal function labile. hold diuretic today d/w Dr. Hernandez    case presented to [facility] - accepted under hospitalist with thoracic consult     renal function labile. hold diuretic today

## 2023-12-27 NOTE — PROGRESS NOTE ADULT - SUBJECTIVE AND OBJECTIVE BOX
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 Patient with recent hospitalization for CHF exacerbation 11/22-11/27 and he left AMA. Patient is a poor historian and unable to give reliable history. Denies any chest pain, palpitations, N/V, HA, dizziness. Reports he takes his medications most of the time. He is concerned about blisters on his leg but unable to recognize HF exacerbation being the underlying issue. Also reports constipation but denies N/V/D. Reports lack of appetite but denies dysphagia.    INTERVAL HPI: Patient seen and examined at bedside. Pt reports feeling better, breathing room air Saturating at 97%. LE pain improving with ace wraps. No overnight events.      REVIEW OF SYSTEMS:  CONSTITUTIONAL: No weakness, fevers or chills  EYES/ENT: No visual changes;  No vertigo or throat pain   NECK: No pain or stiffness  RESPIRATORY: improving SOB  CARDIOVASCULAR: No chest pain or palpitations  GASTROINTESTINAL: No abdominal or epigastric pain. No nausea, vomiting, or hematemesis; No diarrhea or constipation. No melena or hematochezia.  GENITOURINARY: No dysuria, frequency or hematuria  NEUROLOGICAL: No numbness or weakness  SKIN: No itching, burning, rashes, or lesions   All other review of systems is negative unless indicated above    PHYSICAL EXAM:  Vital Signs Last 24 Hrs  T(C): 36.6 (27 Dec 2023 05:14), Max: 36.7 (26 Dec 2023 13:32)  T(F): 97.8 (27 Dec 2023 05:14), Max: 98 (26 Dec 2023 13:32)  HR: 72 (27 Dec 2023 08:39) (72 - 85)  BP: 125/63 (27 Dec 2023 05:14) (111/61 - 125/63)  BP(mean): --  RR: 16 (27 Dec 2023 05:14) (16 - 18)  SpO2: 98% (27 Dec 2023 08:39) (95% - 98%)    Parameters below as of 27 Dec 2023 08:39  Patient On (Oxygen Delivery Method): room air      PHYSICAL EXAM:  Constitutional: Pt sitting in chair, awake and alert, NAD  HEENT: EOMI, normal hearing, moist mucous membranes  Respiratory: nonlabored breathing, good air entry, decreased breath sounds on left  Cardiovascular: S1S2+, RRR, no M/G/R  Gastrointestinal: BS+, soft, NT/ND, no guarding, no rebound  Extremities: 2+ pitting edema b/l LE, open LE blisters noted b/l ACE wrapped  Vascular: 2+ peripheral pulses  Neurological: AAOx3, no focal deficits      MEDICATIONS:  MEDICATIONS  (STANDING):  allopurinol 100 milliGRAM(s) Oral daily  apixaban 2.5 milliGRAM(s) Oral every 12 hours  aspirin enteric coated 81 milliGRAM(s) Oral daily  atorvastatin 40 milliGRAM(s) Oral at bedtime  bacitracin   Ointment 1 Application(s) Topical daily  budesonide 160 MICROgram(s)/formoterol 4.5 MICROgram(s) Inhaler 2 Puff(s) Inhalation two times a day  melatonin 3 milliGRAM(s) Oral at bedtime  metoprolol succinate ER 50 milliGRAM(s) Oral daily  pantoprazole    Tablet 40 milliGRAM(s) Oral before breakfast  polyethylene glycol 3350 17 Gram(s) Oral two times a day  senna 2 Tablet(s) Oral at bedtime  tiotropium 2.5 MICROgram(s) Inhaler 2 Puff(s) Inhalation daily      LABS: All Labs Reviewed:                             9.0    7.66  )-----------( 171      ( 27 Dec 2023 05:33 )             28.3     12-27    142  |  101  |  103<H>  ----------------------------<  134<H>  4.3   |  32<H>  |  1.95<H>    Ca    8.9      27 Dec 2023 05:33    TPro  7.1  /  Alb  2.5<L>  /  TBili  0.5  /  DBili  x   /  AST  22  /  ALT  19  /  AlkPhos  62  12-27    LIVER FUNCTIONS - ( 27 Dec 2023 05:33 )  Alb: 2.5 g/dL / Pro: 7.1 g/dL / ALK PHOS: 62 U/L / ALT: 19 U/L / AST: 22 U/L / GGT: x

## 2023-12-28 NOTE — PROGRESS NOTE ADULT - NS ATTEND AMEND GEN_ALL_CORE FT
Patient seen and examined independently. comfortable. no cp n o so    still with decreased bs mostly left sided    af well controlled    cxr- no congestion still with large left effusion      continue bblocker and a/c    no need for further diuresis at this time given marked prerenal azotemia 89-year-old man admitted with progressive lower extremity edema and shortness of breath.  Cardiology consulted for CHF exacerbation.  He has history of atrial fibrillation maintained on anticoagulation, CHF, hypertension, CKD, COPD, prior history of CVA.  Status post thoracentesis x 2.  Patient now awaiting transfer to tertiary care center for thoracic surgery, possible pleuroscopy and Pleurx catheter.    Recommendations  -Continue with metoprolol for rate control of atrial fibrillation.  -Continue with anticoagulation.  -Continue IV diuresis.  -Continue to monitor blood work and replete electrolytes as needed.  Discussed with patient, family member at bedside, primary team and with cardiology NP.

## 2023-12-28 NOTE — PROGRESS NOTE ADULT - ASSESSMENT
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

## 2023-12-28 NOTE — PROGRESS NOTE ADULT - SUBJECTIVE AND OBJECTIVE BOX
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 Patient with recent hospitalization for CHF exacerbation 11/22-11/27 and he left AMA. Patient is a poor historian and unable to give reliable history. Denies any chest pain, palpitations, N/V, HA, dizziness. Reports he takes his medications most of the time. He is concerned about blisters on his leg but unable to recognize HF exacerbation being the underlying issue. Also reports constipation but denies N/V/D. Reports lack of appetite but denies dysphagia.    INTERVAL HPI: Patient seen and examined at bedside. Pt reports feeling better, breathing room air Saturating at 95%. LE pain/swelling improving with ace wraps. No overnight events.      REVIEW OF SYSTEMS:  CONSTITUTIONAL: No weakness, fevers or chills  EYES/ENT: No visual changes;  No vertigo or throat pain   NECK: No pain or stiffness  RESPIRATORY: improving SOB  CARDIOVASCULAR: No chest pain or palpitations  GASTROINTESTINAL: No abdominal or epigastric pain. No nausea, vomiting, or hematemesis; No diarrhea or constipation. No melena or hematochezia.  GENITOURINARY: No dysuria, frequency or hematuria  NEUROLOGICAL: No numbness or weakness  SKIN: No itching, burning, rashes, or lesions   All other review of systems is negative unless indicated above    PHYSICAL EXAM:  Vital Signs Last 24 Hrs  T(C): 36.6 (28 Dec 2023 05:34), Max: 36.7 (27 Dec 2023 21:11)  T(F): 97.8 (28 Dec 2023 05:34), Max: 98 (27 Dec 2023 21:11)  HR: 68 (28 Dec 2023 05:34) (68 - 79)  BP: 121/74 (28 Dec 2023 05:34) (109/70 - 121/74)  BP(mean): --  RR: 16 (28 Dec 2023 05:34) (15 - 16)  SpO2: 95% (28 Dec 2023 05:34) (93% - 96%)    Parameters below as of 28 Dec 2023 05:34  Patient On (Oxygen Delivery Method): room air      PHYSICAL EXAM:  Constitutional: Pt sitting in chair, awake and alert, NAD  HEENT: EOMI, normal hearing, moist mucous membranes  Respiratory: nonlabored breathing, good air entry, decreased breath sounds on left  Cardiovascular: S1S2+, RRR, no M/G/R  Gastrointestinal: BS+, soft, NT/ND, no guarding, no rebound  Extremities: 2+ pitting edema b/l LE, open LE blisters noted b/l ACE wrapped  Vascular: 2+ peripheral pulses  Neurological: AAOx3, no focal deficits      MEDICATIONS:  MEDICATIONS  (STANDING):  allopurinol 100 milliGRAM(s) Oral daily  apixaban 2.5 milliGRAM(s) Oral every 12 hours  aspirin enteric coated 81 milliGRAM(s) Oral daily  atorvastatin 40 milliGRAM(s) Oral at bedtime  bacitracin   Ointment 1 Application(s) Topical daily  budesonide 160 MICROgram(s)/formoterol 4.5 MICROgram(s) Inhaler 2 Puff(s) Inhalation two times a day  melatonin 3 milliGRAM(s) Oral at bedtime  metoprolol succinate ER 50 milliGRAM(s) Oral daily  pantoprazole    Tablet 40 milliGRAM(s) Oral before breakfast  polyethylene glycol 3350 17 Gram(s) Oral two times a day  senna 2 Tablet(s) Oral at bedtime  tiotropium 2.5 MICROgram(s) Inhaler 2 Puff(s) Inhalation daily      LABS: All Labs Reviewed:                             9.1    7.56  )-----------( 169      ( 28 Dec 2023 05:00 )             28.6     12-28    144  |  102  |  105<H>  ----------------------------<  93  3.9   |  33<H>  |  1.71<H>    Ca    9.0      28 Dec 2023 05:00    TPro  7.2  /  Alb  2.5<L>  /  TBili  0.6  /  DBili  x   /  AST  23  /  ALT  16  /  AlkPhos  61  12-28    LIVER FUNCTIONS - ( 28 Dec 2023 05:00 )  Alb: 2.5 g/dL / Pro: 7.2 g/dL / ALK PHOS: 61 U/L / ALT: 16 U/L / AST: 23 U/L / GGT: x

## 2023-12-28 NOTE — PROGRESS NOTE ADULT - PROBLEM SELECTOR PLAN 2
-s/p thoracentesis with 1 L removal during last hospitalization  -cytopath negative for malignancy. Suspect 2/2 HF exacerbation   -recommend aggressive diuresis, if respiratory distress - may need therapeutic thoracentesis.  -pulm recs appreciated  -pending transfer to [facility] for pleuroscopy and pleurx cath placement; accepting Dr. Guerrero and thoracic surgeon Dr. Bhat -s/p thoracentesis with 1 L removal during last hospitalization  -cytopath negative for malignancy. Suspect 2/2 HF exacerbation   -recommend aggressive diuresis, if respiratory distress - may need therapeutic thoracentesis.  -pulm recs appreciated  -pending transfer to [facility] for pleuroscopy and pleurx cath placement; accepting Dr. Guerrero and thoracic surgeon Dr. Bhat

## 2023-12-28 NOTE — PROGRESS NOTE ADULT - NUTRITIONAL ASSESSMENT
This patient has been assessed with a concern for Malnutrition and has been determined to have a diagnosis/diagnoses of Severe protein-calorie malnutrition.    This patient is being managed with:   Diet Regular-  1000mL Fluid Restriction ([id number])  Low Sodium  Piero(7 Gm Arginine/7 Gm Glut/1.2 Gm HMB     Qty per Day:  2  Supplement Feeding Modality:  Oral  Ensure Plus High Protein Cans or Servings Per Day:  1       Frequency:  Daily  Entered: Dec 19 2023 11:11AM   This patient has been assessed with a concern for Malnutrition and has been determined to have a diagnosis/diagnoses of Severe protein-calorie malnutrition.    This patient is being managed with:   Diet Regular-  1000mL Fluid Restriction ([id number])  Low Sodium  Piero(7 Gm Arginine/7 Gm Glut/1.2 Gm HMB     Qty per Day:  2  Supplement Feeding Modality:  Oral  Ensure Plus High Protein Cans or Servings Per Day:  1       Frequency:  Daily  Entered: Dec 19 2023 11:11AM

## 2023-12-28 NOTE — PROGRESS NOTE ADULT - NUTRITIONAL ASSESSMENT
This patient has been assessed with a concern for Malnutrition and has been determined to have a diagnosis/diagnoses of Severe protein-calorie malnutrition.    This patient is being managed with:   Diet Regular-  1000mL Fluid Restriction ([id number])  Low Sodium  Piero(7 Gm Arginine/7 Gm Glut/1.2 Gm HMB     Qty per Day:  2  Supplement Feeding Modality:  Oral  Ensure Plus High Protein Cans or Servings Per Day:  1       Frequency:  Daily  Entered: Dec 19 2023 11:11AM   This patient has been assessed with a concern for Malnutrition and has been determined to have a diagnosis/diagnoses of Severe protein-calorie malnutrition.    This patient is being managed with:   Diet Regular-  1000mL Fluid Restriction ([id number])  Low Sodium  Piero(7 Gm Arginine/7 Gm Glut/1.2 Gm HMB     Qty per Day:  2  Supplement Feeding Modality:  Oral  Ensure Plus High Protein Cans or Servings Per Day:  1       Frequency:  Daily  Entered: Dec 19 2023 11:11AM

## 2023-12-28 NOTE — PROGRESS NOTE ADULT - SUBJECTIVE AND OBJECTIVE BOX
Chief Complaint: HFpEF, left effusion  Interval events: pt seen and examined, labs and chart reviewed. pt on room air, reports doing better. no sob. mild improvement in l/e edema. AF 60-70s on tele    PMH  Afib    Congestive heart failure (CHF)    CVA (cerebral vascular accident)    Hypertension, unspecified type    Chronic obstructive pulmonary disease (COPD)        MEDICATIONS  (STANDING):  allopurinol 100 milliGRAM(s) Oral daily  apixaban 2.5 milliGRAM(s) Oral every 12 hours  aspirin enteric coated 81 milliGRAM(s) Oral daily  atorvastatin 40 milliGRAM(s) Oral at bedtime  bacitracin   Ointment 1 Application(s) Topical daily  budesonide 160 MICROgram(s)/formoterol 4.5 MICROgram(s) Inhaler 2 Puff(s) Inhalation two times a day  furosemide   Injectable 60 milliGRAM(s) IV Push once  melatonin 3 milliGRAM(s) Oral at bedtime  metoprolol succinate ER 50 milliGRAM(s) Oral daily  pantoprazole    Tablet 40 milliGRAM(s) Oral before breakfast  polyethylene glycol 3350 17 Gram(s) Oral two times a day  senna 2 Tablet(s) Oral at bedtime  tiotropium 2.5 MICROgram(s) Inhaler 2 Puff(s) Inhalation daily    MEDICATIONS  (PRN):  acetaminophen     Tablet .. 650 milliGRAM(s) Oral every 6 hours PRN Temp greater or equal to 38C (100.4F), Mild Pain (1 - 3)  albuterol    90 MICROgram(s) HFA Inhaler 2 Puff(s) Inhalation every 6 hours PRN Shortness of Breath and/or Wheezing        PHYSICAL EXAM:  Vital Signs Last 24 Hrs  T(C): 36.4 (26 Dec 2023 05:00), Max: 36.8 (25 Dec 2023 20:46)  T(F): 97.6 (26 Dec 2023 05:00), Max: 98.2 (25 Dec 2023 20:46)  HR: 74 (26 Dec 2023 05:00) (74 - 103)  BP: 134/56 (26 Dec 2023 05:00) (127/53 - 158/69)  BP(mean): --  RR: 17 (26 Dec 2023 05:00) (17 - 18)  SpO2: 94% (26 Dec 2023 05:00) (94% - 96%)    Parameters below as of 26 Dec 2023 05:00  Patient On (Oxygen Delivery Method): room air        GENERAL: NAD  HEAD:  Atraumatic, Normocephalic  EYES:  conjunctiva and sclera clear  ENT: Moist mucous membranes,  NECK: Supple, No JVD, no bruits  CHEST/LUNG:  diminished lung sounds on left  HEART: No murmurs, rubs, or gallops PMI non displaced.  ABDOMEN: Soft, Nontender, Nondistended; Bowel sounds present  EXTREMITIES:   2+ edema  NERVOUS SYSTEM:  Alert       LABS:                        9.1    7.08  )-----------( 171      ( 26 Dec 2023 08:52 )             28.8     12-26    143  |  101  |  110<H>  ----------------------------<  104<H>  4.1   |  33<H>  |  1.77<H>    Ca    9.1      26 Dec 2023 08:52  Mg     2.7     12-25    TPro  7.3  /  Alb  2.6<L>  /  TBili  0.7  /  DBili  x   /  AST  23  /  ALT  19  /  AlkPhos  61  12-26    I&O's Summary    25 Dec 2023 07:01  -  26 Dec 2023 07:00  --------------------------------------------------------  IN: 0 mL / OUT: 400 mL / NET: -400 mL    ECHO: < from: TTE Echo Limited or F/U (12.19.23 @ 08:05) >   1. The heart rhythm is irregular throughout the study.   2. Normal global left ventricular systolic function.   3. Left ventricular ejection fraction, by visual estimation, is 50 to   55%.   4. Mildly increased LV wall thickness.   5. Normal left ventricular internal cavity size.   6. The right ventricle is not well visualized, appears generally normal   in size.   7. Left atrial enlargement.   8. Right atrial enlargement.   9. Mild mitral annular calcification.  10. Moderate thickening and calcification of the anterior and posterior   mitral valve leaflets.  11. Moderate mitral valve stenosis (mean gradient 8 mmHg at HR 69 BPM,   MVA 1.3 cm^2 by  msec).  12. Mild mitral valve regurgitation.  13. Mild-moderate tricuspid regurgitation.  14. Aortic valve leaflet calcification. No aortic valve stenosis.  15. Sclerotic aortic valve with normal opening.  16. Mild aortic regurgitation.  17. Estimated pulmonary artery systolic pressure is 38.0 mmHg assuming a   right atrial pressure of 15 mmHg, which is consistent with borderline   pulmonary hypertension.  18. Moderate pleural effusion in both left and right lateral regions.  19. There is no evidence of pericardial effusion.    EKG:  AFib

## 2023-12-28 NOTE — PROGRESS NOTE ADULT - ASSESSMENT
Assessmentl: 89 year old man with past medical history of Atrial fibrillation (on Eliquis), HFrEF (LVEF 25-30% in 2019), Hypertension, Chronic kidney disease, COPD, history of CVA with recurrent hospitalizations for congestive heart failure, presented with progressive lower extremity edema and shortness of breath. Pt s/p 2 thoracentesis this admission    ECG consistent with atrial fibrillation and nonspecific ST abnormalities. Troponins are not elevated in range that would suggest an acute coronary syndrome. Pro BNP elevated but less than prior CHF hospitalization. Chest xray with increasing left effusion. The patient is not able to explain what medications he takes and there is concern for medication noncompliance.     Repeat echo with LVEF 50-55% and moderate mitral valve stenosis.     Recommendations:  [] HFpEF, Moderate mitral stenosis: Likely secondary to component of noncompliance. s/p thoracentesis with 1L fluid removed, cytology nonmalignant.  Lasix increased to 60 ivp per primary team (pt on Torsemide at home). Monitor renal function. If renal function stabilizes then may consider SGLT2-I this admission. Patient not interested in surgical procedures for treatment for the mitral stenosis due to his age of 89.  [] Atrial fibrillation: Continue beta blocker, resume Eliquis for stroke prophylaxis once safe per Pulmonary .     Improving chf  af, rate controlled  ashd  pleural effusion, exudative,  cytology pending  improved Creat    suggest  current cardiac meds, diuresis as needed as per pulm  awaiting cytology    Carlotta LOYD-BC Assessmentl: 89 year old man with past medical history of Atrial fibrillation (on Eliquis), HFrEF (LVEF 25-30% in 2019), Hypertension, Chronic kidney disease, COPD, history of CVA with recurrent hospitalizations for congestive heart failure, presented with progressive lower extremity edema and shortness of breath. Cardiology was consulted for CHF exacerbation, pt s/p diuresis and 2 thoracentesis this admission 11/24 and 12/19 12/19: exudative, bloody    Recommendations:  - AF is rate controlled, continue current cv meds  - xr still with effusion, pt s/p iv diuresis, pulm is following. pt pending transfer for thoracic services for possible pleuroscopy and pleurex    Carlotta MACKCNP-BC

## 2023-12-28 NOTE — PROGRESS NOTE ADULT - ATTENDING COMMENTS
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 Guerrero and thoracic surgeon Dr. 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 Guerrero and thoracic surgeon Dr. Bhat, cont diuresis, apprec cardio recs, monitor clinical course, guarded to poor prognosis, apprec palliative recs

## 2023-12-28 NOTE — PROGRESS NOTE ADULT - PROBLEM SELECTOR PLAN 8
DVT ppx: on eliquis    FULL Code    Case was discussed with attending, Dr. Licea DVT ppx: on eliquis    FULL Code  Palliative consult appreciated    Dispo: pending transfer to [facility] for medical intervention, papers in chart    Case was discussed with attending, Dr. Licea DVT ppx: on eliquis    FULL Code  Palliative consult appreciated    Dispo: pending transfer to [facility] for medical intervention, papers in chart    Case was discussed with attending, Dr. Licea

## 2023-12-29 NOTE — PROGRESS NOTE ADULT - ASSESSMENT
Assessment: 89 year old man with past medical history of Atrial fibrillation (on Eliquis), HFrEF (LVEF 25-30% in 2019), Hypertension, Chronic kidney disease, COPD, history of CVA with recurrent hospitalizations for congestive heart failure, presented with progressive lower extremity edema and shortness of breath. Cardiology was consulted for CHF exacerbation, pt s/p diuresis and thoracentesis x 2 this admission 11/24 and 12/19 12/19: exudative, bloody    Recommendations:  - AF is rate controlled, continue current cv meds  - xr still with large effusion, pt s/p iv diuresis, pulm is following. pt pending transfer for thoracic services for possible pleuroscopy and pleurex    Carlotta CONNELLY-BC

## 2023-12-29 NOTE — PROGRESS NOTE ADULT - ATTENDING COMMENTS
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 for pleuroscopy and pleurx cath placement; accepting Dr. Guerrero and thoracic surgeon Dr. 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 Guerrero and thoracic surgeon Dr. Bhat, cont diuresis, apprec cardio recs, monitor clinical course, guarded to poor prognosis, apprec palliative recs

## 2023-12-29 NOTE — PROGRESS NOTE ADULT - ASSESSMENT
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

## 2023-12-29 NOTE — PROGRESS NOTE ADULT - PROBLEM SELECTOR PLAN 9
DVT ppx: on eliquis    FULL Code  Palliative consult appreciated    Dispo: pending transfer to [facility] for medical intervention, papers in chart    Case was discussed with attending, Dr. Licea DVT ppx: on eliquis    FULL Code  Palliative consult appreciated    Dispo: pending transfer to [facility] for medical intervention, papers in chart    Case was discussed with attending, Dr. Licea

## 2023-12-29 NOTE — PROGRESS NOTE ADULT - SUBJECTIVE AND OBJECTIVE BOX
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 Patient with recent hospitalization for CHF exacerbation 11/22-11/27 and he left AMA. Patient is a poor historian and unable to give reliable history. Denies any chest pain, palpitations, N/V, HA, dizziness. Reports he takes his medications most of the time. He is concerned about blisters on his leg but unable to recognize HF exacerbation being the underlying issue. Also reports constipation but denies N/V/D. Reports lack of appetite but denies dysphagia.    INTERVAL HPI: Patient seen and examined at bedside. Pt reports feeling ok, needed O2 on/off due to SOB at times. BLE wounds and edema worsening. No overnight events.      REVIEW OF SYSTEMS:  CONSTITUTIONAL: No weakness, fevers or chills  EYES/ENT: No visual changes;  No vertigo or throat pain   NECK: No pain or stiffness  RESPIRATORY: +SOB  CARDIOVASCULAR: No chest pain or palpitations  GASTROINTESTINAL: No abdominal or epigastric pain. No nausea, vomiting, or hematemesis; No diarrhea or constipation. No melena or hematochezia.  GENITOURINARY: No dysuria, frequency or hematuria  NEUROLOGICAL: No numbness or weakness  SKIN: No itching, burning, rashes, or lesions   All other review of systems is negative unless indicated above    PHYSICAL EXAM:  Vital Signs Last 24 Hrs  T(C): 36.4 (29 Dec 2023 05:00), Max: 36.6 (28 Dec 2023 20:37)  T(F): 97.5 (29 Dec 2023 05:00), Max: 97.8 (28 Dec 2023 20:37)  HR: 66 (29 Dec 2023 05:00) (62 - 98)  BP: 133/70 (29 Dec 2023 05:00) (123/68 - 133/70)  BP(mean): 91 (29 Dec 2023 05:00) (91 - 91)  RR: 16 (29 Dec 2023 05:00) (16 - 16)  SpO2: 100% (29 Dec 2023 05:00) (95% - 100%)    Parameters below as of 29 Dec 2023 05:00  Patient On (Oxygen Delivery Method): nasal cannula  O2 Flow (L/min): 2      PHYSICAL EXAM:  Constitutional: Pt sitting in chair, awake and alert, NAD, NC in place  HEENT: EOMI, normal hearing, moist mucous membranes  Respiratory: nonlabored breathing, good air entry, decreased breath sounds on left  Cardiovascular: S1S2+, RRR, no M/G/R  Gastrointestinal: BS+, soft, NT/ND, no guarding, no rebound  Extremities: 2+ pitting edema b/l LE, open LE blisters covered with bacitracin  Vascular: 2+ peripheral pulses  Neurological: AAOx3, no focal deficits      MEDICATIONS:  MEDICATIONS  (STANDING):  allopurinol 100 milliGRAM(s) Oral daily  apixaban 2.5 milliGRAM(s) Oral every 12 hours  aspirin enteric coated 81 milliGRAM(s) Oral daily  atorvastatin 40 milliGRAM(s) Oral at bedtime  bacitracin   Ointment 1 Application(s) Topical daily  budesonide 160 MICROgram(s)/formoterol 4.5 MICROgram(s) Inhaler 2 Puff(s) Inhalation two times a day  melatonin 3 milliGRAM(s) Oral at bedtime  metoprolol succinate ER 50 milliGRAM(s) Oral daily  pantoprazole    Tablet 40 milliGRAM(s) Oral before breakfast  polyethylene glycol 3350 17 Gram(s) Oral two times a day  senna 2 Tablet(s) Oral at bedtime  tiotropium 2.5 MICROgram(s) Inhaler 2 Puff(s) Inhalation daily      LABS: All Labs Reviewed:                    8.9    7.38  )-----------( 179      ( 29 Dec 2023 07:13 )             28.8     12-29    142  |  104  |  109<H>  ----------------------------<  109<H>  4.0   |  33<H>  |  1.68<H>    Ca    9.0      29 Dec 2023 07:13    TPro  7.3  /  Alb  2.6<L>  /  TBili  0.5  /  DBili  x   /  AST  24  /  ALT  18  /  AlkPhos  62  12-29    LIVER FUNCTIONS - ( 29 Dec 2023 07:13 )  Alb: 2.6 g/dL / Pro: 7.3 g/dL / ALK PHOS: 62 U/L / ALT: 18 U/L / AST: 24 U/L / GGT: x

## 2023-12-29 NOTE — CHART NOTE - NSCHARTNOTEFT_GEN_A_CORE
Nutrition Follow Up Note  Hospital Course (Per Electronic Medical Record):   Source: Medical Record [X] Patient [X] Nursing Staff [X]     Diet:  Diet, Regular:   1000mL Fluid Restriction ([id number])  Low Sodium  Piero  BID   Ensure Plus High Protein  QD       Patient remains tolerating diet , PO intake noted good 50-75% , labs reviewed , Patient awaiting transfer to [facility] for Pleurax Cath . Patient receiving po supplements due to LE wounds . Encouraged compliance with FR  .     Current Weight: (12/27) 160.9/73kg                          (12/26) 160.9/73kg                          (12/23) 156.5/71kg     Pertinent Medications: MEDICATIONS  (STANDING):  allopurinol 100 milliGRAM(s) Oral daily  apixaban 2.5 milliGRAM(s) Oral every 12 hours  aspirin enteric coated 81 milliGRAM(s) Oral daily  atorvastatin 40 milliGRAM(s) Oral at bedtime  bacitracin   Ointment 1 Application(s) Topical daily  budesonide 160 MICROgram(s)/formoterol 4.5 MICROgram(s) Inhaler 2 Puff(s) Inhalation two times a day  melatonin 3 milliGRAM(s) Oral at bedtime  metoprolol succinate ER 50 milliGRAM(s) Oral daily  pantoprazole    Tablet 40 milliGRAM(s) Oral before breakfast  polyethylene glycol 3350 17 Gram(s) Oral two times a day  senna 2 Tablet(s) Oral at bedtime  tiotropium 2.5 MICROgram(s) Inhaler 2 Puff(s) Inhalation daily    MEDICATIONS  (PRN):  acetaminophen     Tablet .. 650 milliGRAM(s) Oral every 6 hours PRN Temp greater or equal to 38C (100.4F), Mild Pain (1 - 3)  albuterol    90 MICROgram(s) HFA Inhaler 2 Puff(s) Inhalation every 6 hours PRN Shortness of Breath and/or Wheezing      Pertinent Labs:  12-29 Na142 mmol/L Glu 109 mg/dL<H> K+ 4.0 mmol/L Cr  1.68 mg/dL<H>  mg/dL<H> 12-29 Alb 2.6 g/dL<L>  Hgb 8.9g/dl<L> , Hct 28.8% <L>       Skin: (R/ L) LE blister,     Edema: (+2) LE , (+3) foot     Last BM: (12/29)     Estimated Needs:   [X] No Change since Previous Assessment      Previous Nutrition Diagnosis: Increased Nutrient Needs     Nutrition Diagnosis is [X] Ongoing & addressed          New Nutrition Diagnosis: [X] Not Applicable    Interventions:   1. continue current diet     Monitoring & Evaluation: will monitor:  [X] Weights   [X] PO Intake   [X] Follow Up (Per Protocol)  [X] Tolerance to Diet Prescription       RD to follow as per Nutrition protocol  Lilia Valle RD, CDN Nutrition Follow Up Note  Hospital Course (Per Electronic Medical Record):   Source: Medical Record [X] Patient [X] Nursing Staff [X]     Diet:  Diet, Regular:   1000mL Fluid Restriction ([id number])  Low Sodium  Piero  BID   Ensure Plus High Protein  QD       Patient remains tolerating diet , PO intake noted good 50-75% , labs reviewed , Patient awaiting transfer to [facility] for Pleurax Cath . Patient receiving po supplements due to LE wounds . Encouraged compliance with FR  .     Current Weight: (12/27) 160.9/73kg                          (12/26) 160.9/73kg                          (12/23) 156.5/71kg     Pertinent Medications: MEDICATIONS  (STANDING):  allopurinol 100 milliGRAM(s) Oral daily  apixaban 2.5 milliGRAM(s) Oral every 12 hours  aspirin enteric coated 81 milliGRAM(s) Oral daily  atorvastatin 40 milliGRAM(s) Oral at bedtime  bacitracin   Ointment 1 Application(s) Topical daily  budesonide 160 MICROgram(s)/formoterol 4.5 MICROgram(s) Inhaler 2 Puff(s) Inhalation two times a day  melatonin 3 milliGRAM(s) Oral at bedtime  metoprolol succinate ER 50 milliGRAM(s) Oral daily  pantoprazole    Tablet 40 milliGRAM(s) Oral before breakfast  polyethylene glycol 3350 17 Gram(s) Oral two times a day  senna 2 Tablet(s) Oral at bedtime  tiotropium 2.5 MICROgram(s) Inhaler 2 Puff(s) Inhalation daily    MEDICATIONS  (PRN):  acetaminophen     Tablet .. 650 milliGRAM(s) Oral every 6 hours PRN Temp greater or equal to 38C (100.4F), Mild Pain (1 - 3)  albuterol    90 MICROgram(s) HFA Inhaler 2 Puff(s) Inhalation every 6 hours PRN Shortness of Breath and/or Wheezing      Pertinent Labs:  12-29 Na142 mmol/L Glu 109 mg/dL<H> K+ 4.0 mmol/L Cr  1.68 mg/dL<H>  mg/dL<H> 12-29 Alb 2.6 g/dL<L>  Hgb 8.9g/dl<L> , Hct 28.8% <L>       Skin: (R/ L) LE blister,     Edema: (+2) LE , (+3) foot     Last BM: (12/29)     Estimated Needs:   [X] No Change since Previous Assessment      Previous Nutrition Diagnosis: Increased Nutrient Needs     Nutrition Diagnosis is [X] Ongoing & addressed          New Nutrition Diagnosis: [X] Not Applicable    Interventions:   1. continue current diet     Monitoring & Evaluation: will monitor:  [X] Weights   [X] PO Intake   [X] Follow Up (Per Protocol)  [X] Tolerance to Diet Prescription       RD to follow as per Nutrition protocol  Lilia Valle RD, CDN

## 2023-12-29 NOTE — PROGRESS NOTE ADULT - SUBJECTIVE AND OBJECTIVE BOX
Chief Complaint: HFpEF, left effusion  Interval events: pt seen and examined, labs and chart reviewed. pt on room air, reports improvement in breathing. no sob. mild improvement in l/e edema, b/l l/e wrapped in ace bandages. AF 60-70s on [facility]    PMH  Afib  Congestive heart failure (CHF)  CVA (cerebral vascular accident)  Hypertension, unspecified type  Chronic obstructive pulmonary disease (COPD)      MEDICATIONS  (STANDING):  allopurinol 100 milliGRAM(s) Oral daily  apixaban 2.5 milliGRAM(s) Oral every 12 hours  aspirin enteric coated 81 milliGRAM(s) Oral daily  atorvastatin 40 milliGRAM(s) Oral at bedtime  bacitracin   Ointment 1 Application(s) Topical daily  budesonide 160 MICROgram(s)/formoterol 4.5 MICROgram(s) Inhaler 2 Puff(s) Inhalation two times a day  furosemide   Injectable 60 milliGRAM(s) IV Push once  melatonin 3 milliGRAM(s) Oral at bedtime  metoprolol succinate ER 50 milliGRAM(s) Oral daily  pantoprazole    Tablet 40 milliGRAM(s) Oral before breakfast  polyethylene glycol 3350 17 Gram(s) Oral two times a day  senna 2 Tablet(s) Oral at bedtime  tiotropium 2.5 MICROgram(s) Inhaler 2 Puff(s) Inhalation daily    MEDICATIONS  (PRN):  acetaminophen     Tablet .. 650 milliGRAM(s) Oral every 6 hours PRN Temp greater or equal to 38C (100.4F), Mild Pain (1 - 3)  albuterol    90 MICROgram(s) HFA Inhaler 2 Puff(s) Inhalation every 6 hours PRN Shortness of Breath and/or Wheezing        PHYSICAL EXAM:  Vital Signs Last 24 Hrs  T(C): 36.4 (26 Dec 2023 05:00), Max: 36.8 (25 Dec 2023 20:46)  T(F): 97.6 (26 Dec 2023 05:00), Max: 98.2 (25 Dec 2023 20:46)  HR: 74 (26 Dec 2023 05:00) (74 - 103)  BP: 134/56 (26 Dec 2023 05:00) (127/53 - 158/69)  BP(mean): --  RR: 17 (26 Dec 2023 05:00) (17 - 18)  SpO2: 94% (26 Dec 2023 05:00) (94% - 96%)    Parameters below as of 26 Dec 2023 05:00  Patient On (Oxygen Delivery Method): room air        GENERAL: NAD  HEAD:  Atraumatic, Normocephalic  EYES:  conjunctiva and sclera clear  ENT: Moist mucous membranes,  NECK: Supple, No JVD, no bruits  CHEST/LUNG:  diminished lung sounds on left  HEART: irregular  ABDOMEN: Soft, Nontender, Nondistended; Bowel sounds present  EXTREMITIES:   2+ edema, b/l l/e bandages  NERVOUS SYSTEM:  Alert       LABS:                        9.1    7.08  )-----------( 171      ( 26 Dec 2023 08:52 )             28.8     12-26    143  |  101  |  110<H>  ----------------------------<  104<H>  4.1   |  33<H>  |  1.77<H>    Ca    9.1      26 Dec 2023 08:52  Mg     2.7     12-25    TPro  7.3  /  Alb  2.6<L>  /  TBili  0.7  /  DBili  x   /  AST  23  /  ALT  19  /  AlkPhos  61  12-26  I&O's Summary    28 Dec 2023 07:01  -  29 Dec 2023 07:00  --------------------------------------------------------  IN: 0 mL / OUT: 200 mL / NET: -200 mL      ECHO: < from: TTE Echo Limited or F/U (12.19.23 @ 08:05) >   1. The heart rhythm is irregular throughout the study.   2. Normal global left ventricular systolic function.   3. Left ventricular ejection fraction, by visual estimation, is 50 to   55%.   4. Mildly increased LV wall thickness.   5. Normal left ventricular internal cavity size.   6. The right ventricle is not well visualized, appears generally normal   in size.   7. Left atrial enlargement.   8. Right atrial enlargement.   9. Mild mitral annular calcification.  10. Moderate thickening and calcification of the anterior and posterior   mitral valve leaflets.  11. Moderate mitral valve stenosis (mean gradient 8 mmHg at HR 69 BPM,   MVA 1.3 cm^2 by  msec).  12. Mild mitral valve regurgitation.  13. Mild-moderate tricuspid regurgitation.  14. Aortic valve leaflet calcification. No aortic valve stenosis.  15. Sclerotic aortic valve with normal opening.  16. Mild aortic regurgitation.  17. Estimated pulmonary artery systolic pressure is 38.0 mmHg assuming a   right atrial pressure of 15 mmHg, which is consistent with borderline   pulmonary hypertension.  18. Moderate pleural effusion in both left and right lateral regions.  19. There is no evidence of pericardial effusion.    EKG:  AFib    XR: < from: Xray Chest 1 View- PORTABLE-Urgent (Xray Chest 1 View- PORTABLE-Urgent .) (12.27.23 @ 15:46) >  IMPRESSION: Persistent large left effusion.           Chief Complaint: HFpEF, left effusion  Interval events: pt seen and examined, labs and chart reviewed. pt on room air, reports improvement in breathing. no sob. mild improvement in l/e edema, b/l l/e wrapped in ace bandages. AF 60-70s on [facility]    PMH  Afib  Congestive heart failure (CHF)  CVA (cerebral vascular accident)  Hypertension, unspecified type  Chronic obstructive pulmonary disease (COPD)      MEDICATIONS  (STANDING):  allopurinol 100 milliGRAM(s) Oral daily  apixaban 2.5 milliGRAM(s) Oral every 12 hours  aspirin enteric coated 81 milliGRAM(s) Oral daily  atorvastatin 40 milliGRAM(s) Oral at bedtime  bacitracin   Ointment 1 Application(s) Topical daily  budesonide 160 MICROgram(s)/formoterol 4.5 MICROgram(s) Inhaler 2 Puff(s) Inhalation two times a day  furosemide   Injectable 60 milliGRAM(s) IV Push once  melatonin 3 milliGRAM(s) Oral at bedtime  metoprolol succinate ER 50 milliGRAM(s) Oral daily  pantoprazole    Tablet 40 milliGRAM(s) Oral before breakfast  polyethylene glycol 3350 17 Gram(s) Oral two times a day  senna 2 Tablet(s) Oral at bedtime  tiotropium 2.5 MICROgram(s) Inhaler 2 Puff(s) Inhalation daily    MEDICATIONS  (PRN):  acetaminophen     Tablet .. 650 milliGRAM(s) Oral every 6 hours PRN Temp greater or equal to 38C (100.4F), Mild Pain (1 - 3)  albuterol    90 MICROgram(s) HFA Inhaler 2 Puff(s) Inhalation every 6 hours PRN Shortness of Breath and/or Wheezing        PHYSICAL EXAM:  Vital Signs Last 24 Hrs  T(C): 36.4 (26 Dec 2023 05:00), Max: 36.8 (25 Dec 2023 20:46)  T(F): 97.6 (26 Dec 2023 05:00), Max: 98.2 (25 Dec 2023 20:46)  HR: 74 (26 Dec 2023 05:00) (74 - 103)  BP: 134/56 (26 Dec 2023 05:00) (127/53 - 158/69)  BP(mean): --  RR: 17 (26 Dec 2023 05:00) (17 - 18)  SpO2: 94% (26 Dec 2023 05:00) (94% - 96%)    Parameters below as of 26 Dec 2023 05:00  Patient On (Oxygen Delivery Method): room air        GENERAL: NAD  HEAD:  Atraumatic, Normocephalic  EYES:  conjunctiva and sclera clear  ENT: Moist mucous membranes,  NECK: Supple, No JVD, no bruits  CHEST/LUNG:  diminished lung sounds on left  HEART: irregular  ABDOMEN: Soft, Nontender, Nondistended; Bowel sounds present  EXTREMITIES:   2+ edema, b/l l/e bandages  NERVOUS SYSTEM:  Alert       LABS:                        9.1    7.08  )-----------( 171      ( 26 Dec 2023 08:52 )             28.8     12-26    143  |  101  |  110<H>  ----------------------------<  104<H>  4.1   |  33<H>  |  1.77<H>    Ca    9.1      26 Dec 2023 08:52  Mg     2.7     12-25    TPro  7.3  /  Alb  2.6<L>  /  TBili  0.7  /  DBili  x   /  AST  23  /  ALT  19  /  AlkPhos  61  12-26  I&O's Summary    28 Dec 2023 07:01  -  29 Dec 2023 07:00  --------------------------------------------------------  IN: 0 mL / OUT: 200 mL / NET: -200 mL      ECHO: < from: TTE Echo Limited or F/U (12.19.23 @ 08:05) >   1. The heart rhythm is irregular throughout the study.   2. Normal global left ventricular systolic function.   3. Left ventricular ejection fraction, by visual estimation, is 50 to   55%.   4. Mildly increased LV wall thickness.   5. Normal left ventricular internal cavity size.   6. The right ventricle is not well visualized, appears generally normal   in size.   7. Left atrial enlargement.   8. Right atrial enlargement.   9. Mild mitral annular calcification.  10. Moderate thickening and calcification of the anterior and posterior   mitral valve leaflets.  11. Moderate mitral valve stenosis (mean gradient 8 mmHg at HR 69 BPM,   MVA 1.3 cm^2 by  msec).  12. Mild mitral valve regurgitation.  13. Mild-moderate tricuspid regurgitation.  14. Aortic valve leaflet calcification. No aortic valve stenosis.  15. Sclerotic aortic valve with normal opening.  16. Mild aortic regurgitation.  17. Estimated pulmonary artery systolic pressure is 38.0 mmHg assuming a   right atrial pressure of 15 mmHg, which is consistent with borderline   pulmonary hypertension.  18. Moderate pleural effusion in both left and right lateral regions.  19. There is no evidence of pericardial effusion.    EKG:  AFib    XR: < from: Xray Chest 1 View- PORTABLE-Urgent (Xray Chest 1 View- PORTABLE-Urgent .) (12.27.23 @ 15:46) >  IMPRESSION: Persistent large left effusion.

## 2023-12-29 NOTE — PROGRESS NOTE ADULT - NUTRITIONAL ASSESSMENT
This patient has been assessed with a concern for Malnutrition and has been determined to have a diagnosis/diagnoses of Severe protein-calorie malnutrition.    This patient is being managed with:   Diet Regular-  1000mL Fluid Restriction ([id number])  Low Sodium  Piero(7 Gm Arginine/7 Gm Glut/1.2 Gm HMB     Qty per Day:  2  Supplement Feeding Modality:  Oral  Ensure Plus High Protein Cans or Servings Per Day:  1       Frequency:  Daily  Entered: Dec 19 2023 11:11AM   This patient has been assessed with a concern for Malnutrition and has been determined to have a diagnosis/diagnoses of Severe protein-calorie malnutrition.    This patient is being managed with:   Diet Regular-  1000mL Fluid Restriction ([id number])  Low Sodium  Piero(7 Gm Arginine/7 Gm Glut/1.2 Gm HMB     Qty per Day:  2  Supplement Feeding Modality:  Oral  Ensure Plus High Protein Cans or Servings Per Day:  1       Frequency:  Daily  Entered: Dec 19 2023 11:11AM

## 2023-12-29 NOTE — PROGRESS NOTE ADULT - NSPROGADDITIONALINFOA_GEN_ALL_CORE
-  -  89-year-old man admitted with progressive lower extremity edema and shortness of breath.  Cardiology consulted for CHF exacerbation.  He has history of atrial fibrillation maintained on anticoagulation, CHF, hypertension, CKD, COPD, prior history of CVA.  Patient awaiting transfer to tertiary care center for thoracic surgery, possible pleuroscopy and Pleurx catheter.    Recommendations  -Continue with metoprolol for rate control of atrial fibrillation.  -Continue with anticoagulation.  -Continue IV diuresis.  -Continue to monitor blood work and replete electrolytes as needed.  Discussed with patient, family member at bedside, primary team and with cardiology NP.
Updates provided to daughter Frida

## 2023-12-29 NOTE — ADVANCED PRACTICE NURSE CONSULT - ASSESSMENT
Patient seen today in room, seated in recliner chair.  Admitted for CHF exacerbation, bilateral lower extremity swelling/edema.  Presents with 3+ pitting edema bilaterally, appears SOB.  The left lower extremity below the knee has numerous (<1 cm) brown scabs, previously serous filled blisters.  Left medial calf with large ruptured blister, draining scant serous fluid.  The right lower extremity with same brown scabs, but also noted medially is a large area (~3 x 3 cm) of deroofed blisters.   Patient seen with Resident this afternoon in room, seated in recliner chair.  Admitted for CHF exacerbation, bilateral lower extremity swelling/edema.  Presents with moderate edema of LE bilaterally.  The left lower extremity below the knee has numerous (<1 cm) brown scabs, previously serous filled blisters.  Left medial calf with large ruptured blister, draining scant serous fluid.  The right lower extremity with same brown scabs, but also noted medially is a large area of deroofed blisters.

## 2023-12-29 NOTE — ADVANCED PRACTICE NURSE CONSULT - RECOMMEDATIONS
Suggest daily Normal Saline Cleanse of open blisters, gently pat thoroughly dry.  Apply Bacitracin as previously ordered to open blisters.  Cover open blisters with 'cut to fit' Adaptic, wrap gently with Mary, avoiding tape directly to skin.  Leave Adaptic in place for 3 days (daily Normal Saline Cleanse & Bacitracin OVER Adaptic).  Continue gentle compression with ACE wraps, from toes to just below knees, on in morning, off @ HS; as tolerated.    Resolution of underlying cause of edema is essential for wound healing.    Elevate bilateral lower extremities whenever possible.  Offload all bony prominences with Turn & Position as needed while in bed.  Offload heels bilaterally by floating on pillow at all times while in bed.   Nutritional support & hydration per Dietician's recommendations. 
Suggest daily Normal Saline Cleanse of open blister, gently pat thoroughly dry.  Apply Bacitracin as previously ordered to open blister.  Cover open & large fluid filled blisters with 'cut to fit' Adaptic, wrap gently with Mary, avoiding tape directly to skin.  Leave Adaptic in place for 3 days (daily Normal Saline Cleanse & Bacitracin OVER Adaptic).    Resolution of underlying cause of edema is essential for wound healing.  Consider gentle compression with ACE wraps, from toes to just below knees, on in morning, off @ HS; as tolerated.    Offload all bony prominences with Turn & Position as needed while in bed.  Offload heels bilaterally by floating on pillow at all times while in bed.   Nutritional support & hydration per Dietician's recommendations.

## 2023-12-29 NOTE — PROGRESS NOTE ADULT - NS ATTEND AMEND GEN_ALL_CORE FT
-  -  89-year-old man admitted with progressive lower extremity edema and shortness of breath.  Cardiology consulted for CHF exacerbation.  He has history of atrial fibrillation maintained on anticoagulation, CHF, hypertension, CKD, COPD, prior history of CVA.  Patient awaiting transfer to tertiary care center for thoracic surgery, possible pleuroscopy and Pleurx catheter.    Recommendations  -Continue with metoprolol for rate control of atrial fibrillation.  -Continue with anticoagulation.  -Continue IV diuresis.  -Continue to monitor blood work and replete electrolytes as needed.  Discussed with patient, family member at bedside, primary team and with cardiology NP.

## 2023-12-29 NOTE — PROGRESS NOTE ADULT - PROBLEM SELECTOR PLAN 2
-s/p thoracentesis with 1 L removal during last hospitalization  -cytopath negative for malignancy. Suspect 2/2 HF exacerbation   -recommend aggressive diuresis, if respiratory distress - may need therapeutic thoracentesis.  -pulm recs appreciated  -pending transfer to [facility] for pleuroscopy and pleurx cath placement; accepting Dr. Guerrero and thoracic surgeon Dr. Bhat -s/p thoracentesis with 1 L removal during last hospitalization  -cytopath negative for malignancy. Suspect 2/2 HF exacerbation   -recommend aggressive diuresis, if respiratory distress - may need therapeutic thoracentesis.  -pulm recs appreciated  -pending transfer to [facility] for pleuroscopy and pleurx cath placement; accepting Dr. Guerrero and thoracic surgeon Dr. Bhat

## 2023-12-29 NOTE — ADVANCED PRACTICE NURSE CONSULT - REASON FOR CONSULT
Assessment & Recommendations for Present on Admission Blistering Lower Extremities
Re-Assessment of Bilateral Lower Extremity Wounds

## 2023-12-30 NOTE — PROGRESS NOTE ADULT - NUTRITIONAL ASSESSMENT
This patient has been assessed with a concern for Malnutrition and has been determined to have a diagnosis/diagnoses of Severe protein-calorie malnutrition.    This patient is being managed with:   Diet Regular-  1000mL Fluid Restriction ([id number])  Low Sodium  Piero(7 Gm Arginine/7 Gm Glut/1.2 Gm HMB     Qty per Day:  2  Supplement Feeding Modality:  Oral  Ensure Plus High Protein Cans or Servings Per Day:  1       Frequency:  Daily  Entered: Dec 19 2023 11:11AM   This patient has been assessed with a concern for Malnutrition and has been determined to have a diagnosis/diagnoses of Severe protein-calorie malnutrition.    This patient is being managed with:   Diet Regular-  1000mL Fluid Restriction ([id number])  Low Sodium  Piero(7 Gm Arginine/7 Gm Glut/1.2 Gm HMB     Qty per Day:  2  Supplement Feeding Modality:  Oral  Ensure Plus High Protein Cans or Servings Per Day:  1       Frequency:  Daily  Entered: Dec 19 2023 11:11AM

## 2023-12-30 NOTE — PROGRESS NOTE ADULT - ATTENDING COMMENTS
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 Guerrero and thoracic surgeon Dr. 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 Guerrero and thoracic surgeon Dr. 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 Guerrero and thoracic surgeon Dr. 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 Guerrero and thoracic surgeon Dr. Bhat, cont diuresis, apprec cardio recs, monitor clinical course, guarded to poor prognosis, apprec palliative recs,

## 2023-12-30 NOTE — PROGRESS NOTE ADULT - SUBJECTIVE AND OBJECTIVE BOX
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 Patient with recent hospitalization for CHF exacerbation 11/22-11/27 and he left AMA. Patient is a poor historian and unable to give reliable history. Denies any chest pain, palpitations, N/V, HA, dizziness. Reports he takes his medications most of the time. He is concerned about blisters on his leg but unable to recognize HF exacerbation being the underlying issue. Also reports constipation but denies N/V/D. Reports lack of appetite but denies dysphagia.    INTERVAL HPI: Patient seen and examined at bedside. Pt reports feeling ok, needed O2 on/off due to SOB at times; encourage using incentice spirometer. BLE wounds and edema worsening. No overnight events.      REVIEW OF SYSTEMS:  CONSTITUTIONAL: No weakness, fevers or chills  EYES/ENT: No visual changes;  No vertigo or throat pain   NECK: No pain or stiffness  RESPIRATORY: +SOB  CARDIOVASCULAR: No chest pain or palpitations  GASTROINTESTINAL: No abdominal or epigastric pain. No nausea, vomiting, or hematemesis; No diarrhea or constipation. No melena or hematochezia.  GENITOURINARY: No dysuria, frequency or hematuria  NEUROLOGICAL: No numbness or weakness  SKIN: No itching, burning, rashes, or lesions   All other review of systems is negative unless indicated above    Vital Signs Last 24 Hrs  T(C): 36.3 (30 Dec 2023 12:56), Max: 36.4 (29 Dec 2023 21:08)  T(F): 97.3 (30 Dec 2023 12:56), Max: 97.6 (29 Dec 2023 21:08)  HR: 61 (30 Dec 2023 12:56) (61 - 96)  BP: 135/55 (30 Dec 2023 12:56) (126/77 - 135/55)  BP(mean): 93 (30 Dec 2023 05:05) (93 - 93)  RR: 18 (30 Dec 2023 12:56) (18 - 18)  SpO2: 97% (30 Dec 2023 12:56) (91% - 98%)      PHYSICAL EXAM:  Constitutional: Pt sitting in chair, awake and alert, NAD, NC in place  HEENT: EOMI, normal hearing, moist mucous membranes  Respiratory: nonlabored breathing, good air entry, decreased breath sounds on left  Cardiovascular: S1S2+, RRR, no M/G/R  Gastrointestinal: BS+, soft, NT/ND, no guarding, no rebound  Extremities: 2+ pitting edema b/l LE, open LE blisters covered with bacitracin  Vascular: 2+ peripheral pulses  Neurological: AAOx3, no focal deficits      MEDICATIONS:  MEDICATIONS  (STANDING):  allopurinol 100 milliGRAM(s) Oral daily  apixaban 2.5 milliGRAM(s) Oral every 12 hours  aspirin enteric coated 81 milliGRAM(s) Oral daily  atorvastatin 40 milliGRAM(s) Oral at bedtime  bacitracin   Ointment 1 Application(s) Topical daily  budesonide 160 MICROgram(s)/formoterol 4.5 MICROgram(s) Inhaler 2 Puff(s) Inhalation two times a day  melatonin 3 milliGRAM(s) Oral at bedtime  metoprolol succinate ER 50 milliGRAM(s) Oral daily  pantoprazole    Tablet 40 milliGRAM(s) Oral before breakfast  polyethylene glycol 3350 17 Gram(s) Oral two times a day  senna 2 Tablet(s) Oral at bedtime  tiotropium 2.5 MICROgram(s) Inhaler 2 Puff(s) Inhalation daily      LABS: All Labs Reviewed:                      9.0    6.90  )-----------( 178      ( 30 Dec 2023 07:30 )             28.7     12-30    144  |  105  |  104<H>  ----------------------------<  101<H>  3.7   |  36<H>  |  1.60<H>    Ca    8.9      30 Dec 2023 07:30    TPro  7.2  /  Alb  2.5<L>  /  TBili  0.6  /  DBili  x   /  AST  22  /  ALT  19  /  AlkPhos  62  12-30    LIVER FUNCTIONS - ( 30 Dec 2023 07:30 )  Alb: 2.5 g/dL / Pro: 7.2 g/dL / ALK PHOS: 62 U/L / ALT: 19 U/L / AST: 22 U/L / GGT: x                                 89y year old M with PMH of 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 Patient with recent hospitalization for CHF exacerbation 11/22-11/27 and he left AMA. Patient is a poor historian and unable to give reliable history. Denies any chest pain, palpitations, N/V, HA, dizziness. Reports he takes his medications most of the time. He is concerned about blisters on his leg but unable to recognize HF exacerbation being the underlying issue. Also reports constipation but denies N/V/D. Reports lack of appetite but denies dysphagia.    INTERVAL HPI: Patient seen and examined at bedside. Pt reports feeling ok, needed O2 on/off due to SOB at times; encourage using incentive spirometer. BLE wounds and edema stable, mild improvement. No overnight events.      REVIEW OF SYSTEMS:  CONSTITUTIONAL: No weakness, fevers or chills  EYES/ENT: No visual changes;  No vertigo or throat pain   NECK: No pain or stiffness  RESPIRATORY: +SOB  CARDIOVASCULAR: No chest pain or palpitations  GASTROINTESTINAL: No abdominal or epigastric pain. No nausea, vomiting, or hematemesis; No diarrhea or constipation. No melena or hematochezia.  GENITOURINARY: No dysuria, frequency or hematuria  NEUROLOGICAL: No numbness or weakness  SKIN: No itching, burning, rashes, or lesions   All other review of systems is negative unless indicated above    Vital Signs Last 24 Hrs  T(C): 36.3 (30 Dec 2023 12:56), Max: 36.4 (29 Dec 2023 21:08)  T(F): 97.3 (30 Dec 2023 12:56), Max: 97.6 (29 Dec 2023 21:08)  HR: 61 (30 Dec 2023 12:56) (61 - 96)  BP: 135/55 (30 Dec 2023 12:56) (126/77 - 135/55)  BP(mean): 93 (30 Dec 2023 05:05) (93 - 93)  RR: 18 (30 Dec 2023 12:56) (18 - 18)  SpO2: 97% (30 Dec 2023 12:56) (91% - 98%)      PHYSICAL EXAM:  Constitutional: Pt sitting in chair, awake and alert, NAD, NC in place  HEENT: EOMI, normal hearing, moist mucous membranes  Respiratory: nonlabored breathing, good air entry, decreased breath sounds on left  Cardiovascular: S1S2+, RRR, no M/G/R  Gastrointestinal: BS+, soft, NT/ND, no guarding, no rebound  Extremities: 2+ pitting edema b/l LE, open LE blisters covered with bacitracin  Vascular: 2+ peripheral pulses  Neurological: AAOx3, no focal deficits      MEDICATIONS:  MEDICATIONS  (STANDING):  allopurinol 100 milliGRAM(s) Oral daily  apixaban 2.5 milliGRAM(s) Oral every 12 hours  aspirin enteric coated 81 milliGRAM(s) Oral daily  atorvastatin 40 milliGRAM(s) Oral at bedtime  bacitracin   Ointment 1 Application(s) Topical daily  budesonide 160 MICROgram(s)/formoterol 4.5 MICROgram(s) Inhaler 2 Puff(s) Inhalation two times a day  melatonin 3 milliGRAM(s) Oral at bedtime  metoprolol succinate ER 50 milliGRAM(s) Oral daily  pantoprazole    Tablet 40 milliGRAM(s) Oral before breakfast  polyethylene glycol 3350 17 Gram(s) Oral two times a day  senna 2 Tablet(s) Oral at bedtime  tiotropium 2.5 MICROgram(s) Inhaler 2 Puff(s) Inhalation daily      LABS: All Labs Reviewed:                      9.0    6.90  )-----------( 178      ( 30 Dec 2023 07:30 )             28.7     12-30    144  |  105  |  104<H>  ----------------------------<  101<H>  3.7   |  36<H>  |  1.60<H>    Ca    8.9      30 Dec 2023 07:30    TPro  7.2  /  Alb  2.5<L>  /  TBili  0.6  /  DBili  x   /  AST  22  /  ALT  19  /  AlkPhos  62  12-30    LIVER FUNCTIONS - ( 30 Dec 2023 07:30 )  Alb: 2.5 g/dL / Pro: 7.2 g/dL / ALK PHOS: 62 U/L / ALT: 19 U/L / AST: 22 U/L / GGT: x

## 2023-12-30 NOTE — PROGRESS NOTE ADULT - TIME BILLING
care coordination, plan of care discussed with patient face to face and wife, 3E IDR team care coordination, plan of care discussed with patient face to face, 3E IDR team

## 2023-12-30 NOTE — PROGRESS NOTE ADULT - PROBLEM SELECTOR PLAN 9
DVT ppx: on eliquis    FULL Code  Palliative consult appreciated    Dispo: pending transfer to [facility] for medical intervention, papers in chart    Case was discussed with attending, Dr. Licea DVT ppx: on eliquis    FULL Code  Palliative consult appreciated    Dispo: pending transfer to [facility] for medical intervention, papers in chart    Case was discussed with attending, Dr. Licea DVT ppx: on eliquis    FULL Code  Palliative consult appreciated    Pt and family updated at bedside regarding plan of care and waiting for bed availability at [facility]    Dispo: pending transfer to [facility] for medical intervention, papers in chart    Case was discussed with attending, Dr. Licea DVT ppx: on eliquis    FULL Code  Palliative consult appreciated    Pt and family updated at bedside regarding plan of care and waiting for bed availability at [facility]    Dispo: pending transfer to [facility] for medical intervention, papers in chart    Case was discussed with attending, Dr. Licea

## 2023-12-30 NOTE — PROGRESS NOTE ADULT - PROBLEM SELECTOR PLAN 6
-BLE open blisters not improving  -will consult wound care for recs -BLE open blisters not improving  -Wound care recs appreaciated: adaptic dressing applied to wounds and changed every 3 days. Use bacitracin everyday over the wounds and ACE wrap both legs to help decrease swelling

## 2023-12-30 NOTE — PROGRESS NOTE ADULT - PROBLEM SELECTOR PLAN 1
-on admission patient clinically overloaded with significant LE edema b/l, left sided effusion and elevated pbnp  -TTE with HFpEF, suspect non compliance with meds and diet at home. Need to obtain collateral from family, patient is poor historian  -Home regimen is torsemide 100 daily per PCP Dr. Rubio    -strict I/os, daily weights, fluid restriction and low salt diet  -monitor on tele  -cardio recs appreciated: If renal function stabilizes then may consider SGLT2-I this admission  -improving volume status; c/w po lasix

## 2023-12-30 NOTE — PROGRESS NOTE ADULT - PROBLEM SELECTOR PLAN 2
-s/p thoracentesis with 1 L removal during last hospitalization  -cytopath negative for malignancy. Suspect 2/2 HF exacerbation   -recommend aggressive diuresis, if respiratory distress - may need therapeutic thoracentesis.  -pulm recs appreciated  -pending transfer to [facility] for pleuroscopy and pleurx cath placement; accepting Dr. Guerrero and thoracic surgeon Dr. Bhat -s/p thoracentesis with 1 L removal during last hospitalization  -cytopath negative for malignancy. Suspect 2/2 HF exacerbation   -recommend aggressive diuresis, if respiratory distress - may need therapeutic thoracentesis.  -pulm recs appreciated  -pending transfer to [facility] for pleuroscopy and pleurx cath placement; accepting Dr. Guerrero and thoracic surgeon Dr. Bhat -s/p thoracentesis with 1 L removal during last hospitalization  -cytopath negative for malignancy. Suspect 2/2 HF exacerbation   -pulm recs appreciated  -pending transfer to [facility] for pleuroscopy and pleurx cath placement; accepting Dr. Guerrero and thoracic surgeon Dr. Bhat -s/p thoracentesis with 1 L removal during last hospitalization  -cytopath negative for malignancy. Suspect 2/2 HF exacerbation   -pulm recs appreciated  -pending transfer to [facility] for pleuroscopy and pleurx cath placement; accepting Dr. Guerrero and thoracic surgeon Dr. Bhat

## 2023-12-31 NOTE — PROGRESS NOTE ADULT - PROBLEM SELECTOR PLAN 4
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

## 2023-12-31 NOTE — PROGRESS NOTE ADULT - SUBJECTIVE AND OBJECTIVE BOX
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 Patient with recent hospitalization for CHF exacerbation 11/22-11/27 and he left AMA. Patient is a poor historian and unable to give reliable history. Denies any chest pain, palpitations, N/V, HA, dizziness. Reports he takes his medications most of the time. He is concerned about blisters on his leg but unable to recognize HF exacerbation being the underlying issue. Also reports constipation but denies N/V/D. Reports lack of appetite but denies dysphagia.    INTERVAL HPI:   Patient seen and examined at bedside. Pt reports feeling ok, needed O2 on/off due to SOB at times; encourage using incentive spirometer. BLE wounds and edema stable, mild improvement. No overnight events.      REVIEW OF SYSTEMS:  CONSTITUTIONAL: No weakness, fevers or chills  EYES/ENT: No visual changes;  No vertigo or throat pain   NECK: No pain or stiffness  RESPIRATORY: +SOB  CARDIOVASCULAR: No chest pain or palpitations  GASTROINTESTINAL: No abdominal or epigastric pain. No nausea, vomiting, or hematemesis; No diarrhea or constipation. No melena or hematochezia.  GENITOURINARY: No dysuria, frequency or hematuria  NEUROLOGICAL: No numbness or weakness  SKIN: No itching, burning, rashes, or lesions   All other review of systems is negative unless indicated above    Vital Signs Last 24 Hrs  *****ADD*****      PHYSICAL EXAM:  Constitutional: Pt sitting in chair, awake and alert, NAD, NC in place  HEENT: EOMI, normal hearing, moist mucous membranes  Respiratory: nonlabored breathing, good air entry, decreased breath sounds on left  Cardiovascular: S1S2+, RRR, no M/G/R  Gastrointestinal: BS+, soft, NT/ND, no guarding, no rebound  Extremities: 2+ pitting edema b/l LE, open LE blisters covered with bacitracin  Vascular: 2+ peripheral pulses  Neurological: AAOx3, no focal deficits      MEDICATIONS:  MEDICATIONS  (STANDING):  allopurinol 100 milliGRAM(s) Oral daily  apixaban 2.5 milliGRAM(s) Oral every 12 hours  aspirin enteric coated 81 milliGRAM(s) Oral daily  atorvastatin 40 milliGRAM(s) Oral at bedtime  bacitracin   Ointment 1 Application(s) Topical daily  budesonide 160 MICROgram(s)/formoterol 4.5 MICROgram(s) Inhaler 2 Puff(s) Inhalation two times a day  melatonin 3 milliGRAM(s) Oral at bedtime  metoprolol succinate ER 50 milliGRAM(s) Oral daily  pantoprazole    Tablet 40 milliGRAM(s) Oral before breakfast  polyethylene glycol 3350 17 Gram(s) Oral two times a day  senna 2 Tablet(s) Oral at bedtime  tiotropium 2.5 MICROgram(s) Inhaler 2 Puff(s) Inhalation daily      LABS: All Labs Reviewed:                               89y year old M with PMH of 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 Patient with recent hospitalization for CHF exacerbation 11/22-11/27 and he left AMA. Patient is a poor historian and unable to give reliable history. Denies any chest pain, palpitations, N/V, HA, dizziness. Reports he takes his medications most of the time. He is concerned about blisters on his leg but unable to recognize HF exacerbation being the underlying issue. Also reports constipation but denies N/V/D. Reports lack of appetite but denies dysphagia.    INTERVAL HPI:   Patient seen and examined at bedside. Pt reports feeling ok, needed O2 on/off due to SOB at times; encourage using incentive spirometer. BLE wounds and edema stable, mild improvement. No overnight events.      REVIEW OF SYSTEMS:  CONSTITUTIONAL: No weakness, fevers or chills  EYES/ENT: No visual changes;  No vertigo or throat pain   NECK: No pain or stiffness  RESPIRATORY: +SOB  CARDIOVASCULAR: No chest pain or palpitations  GASTROINTESTINAL: No abdominal or epigastric pain. No nausea, vomiting, or hematemesis; No diarrhea or constipation. No melena or hematochezia.  GENITOURINARY: No dysuria, frequency or hematuria  NEUROLOGICAL: No numbness or weakness  SKIN: No itching, burning, rashes, or lesions   All other review of systems is negative unless indicated above      Vital Signs Last 24 Hrs  T(C): 36.2 (31 Dec 2023 12:49), Max: 36.7 (31 Dec 2023 05:44)  T(F): 97.2 (31 Dec 2023 12:49), Max: 98.1 (31 Dec 2023 05:44)  HR: 72 (31 Dec 2023 12:49) (60 - 87)  BP: 113/76 (31 Dec 2023 12:49) (113/76 - 154/88)  BP(mean): --  RR: 18 (31 Dec 2023 12:49) (17 - 18)  SpO2: 94% (31 Dec 2023 12:49) (91% - 96%)    Parameters below as of 31 Dec 2023 12:49  Patient On (Oxygen Delivery Method): nasal cannula  O2 Flow (L/min): 2        PHYSICAL EXAM:  Constitutional: Pt sitting in chair, awake and alert, NAD, NC in place  HEENT: EOMI, normal hearing, moist mucous membranes  Respiratory: nonlabored breathing, good air entry, decreased breath sounds on left  Cardiovascular: S1S2+, RRR, no M/G/R  Gastrointestinal: BS+, soft, NT/ND, no guarding, no rebound  Extremities: 2+ pitting edema b/l LE, open LE blisters covered with bacitracin  Vascular: 2+ peripheral pulses  Neurological: AAOx3, no focal deficits      MEDICATIONS:  MEDICATIONS  (STANDING):  allopurinol 100 milliGRAM(s) Oral daily  apixaban 2.5 milliGRAM(s) Oral every 12 hours  aspirin enteric coated 81 milliGRAM(s) Oral daily  atorvastatin 40 milliGRAM(s) Oral at bedtime  bacitracin   Ointment 1 Application(s) Topical daily  budesonide 160 MICROgram(s)/formoterol 4.5 MICROgram(s) Inhaler 2 Puff(s) Inhalation two times a day  melatonin 3 milliGRAM(s) Oral at bedtime  metoprolol succinate ER 50 milliGRAM(s) Oral daily  pantoprazole    Tablet 40 milliGRAM(s) Oral before breakfast  polyethylene glycol 3350 17 Gram(s) Oral two times a day  senna 2 Tablet(s) Oral at bedtime  tiotropium 2.5 MICROgram(s) Inhaler 2 Puff(s) Inhalation daily      LABS: All Labs Reviewed:                 9.1    7.25  )-----------( 196      ( 31 Dec 2023 09:28 )             29.1     12-31  142  |  102  |  95<H>  ----------------------------<  145<H>  4.1   |  33<H>  |  1.65<H>  Ca    9.1      31 Dec 2023 09:28  TPro  7.2  /  Alb  2.5<L>  /  TBili  0.6  /  DBili  x   /  AST  22  /  ALT  19  /  AlkPhos  62  12-30          Urinalysis Basic - ( 31 Dec 2023 09:28 )  Color: x / Appearance: x / SG: x / pH: x  Gluc: 145 mg/dL / Ketone: x  / Bili: x / Urobili: x   Blood: x / Protein: x / Nitrite: x   Leuk Esterase: x / RBC: x / WBC x   Sq Epi: x / Non Sq Epi: x / Bacteria: x

## 2023-12-31 NOTE — PROGRESS NOTE ADULT - PROBLEM SELECTOR PLAN 1
-on admission patient clinically overloaded with significant LE edema b/l, left sided effusion and elevated pbnp  -TTE with HFpEF, suspect non compliance with meds and diet at home. Need to obtain collateral from family, patient is poor historian  -Home regimen is torsemide 100 daily per PCP Dr. Rubio    -strict I/os, daily weights, fluid restriction and low salt diet  -monitor on tele  -cardio recs appreciated: If renal function stabilizes then may consider SGLT2-I this admission  -improving volume status  - c/w po lasix

## 2023-12-31 NOTE — PROGRESS NOTE ADULT - ATTENDING COMMENTS
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 Guerrero and thoracic surgeon Dr. 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 uGerrero and thoracic surgeon Dr. Bhat, cont diuresis, apprec cardio recs, monitor clinical course, guarded to poor prognosis, apprec palliative recs,

## 2023-12-31 NOTE — PROGRESS NOTE ADULT - NUTRITIONAL ASSESSMENT
This patient has been assessed with a concern for Malnutrition and has been determined to have a diagnosis/diagnoses of Severe protein-calorie malnutrition.    This patient is being managed with:   Diet Regular-  1000mL Fluid Restriction ([id number])  Low Sodium  Piero(7 Gm Arginine/7 Gm Glut/1.2 Gm HMB     Qty per Day:  2  Supplement Feeding Modality:  Oral  Ensure Plus High Protein Cans or Servings Per Day:  1       Frequency:  Daily  Entered: Dec 19 2023 11:11AM   This patient has been assessed with a concern for Malnutrition and has been determined to have a diagnosis/diagnoses of Severe protein-calorie malnutrition.    This patient is being managed with:   Diet Regular-  1000mL Fluid Restriction ([id number])  Low Sodium  Piero(7 Gm Arginine/7 Gm Glut/1.2 Gm HMB     Qty per Day:  2  Supplement Feeding Modality:  Oral  Ensure Plus High Protein Cans or Servings Per Day:  1       Frequency:  Daily  Entered: Dec 19 2023 11:11AM

## 2023-12-31 NOTE — PROGRESS NOTE ADULT - PROBLEM SELECTOR PLAN 9
DVT ppx: on eliquis    FULL Code  Palliative consult appreciated    Pt and family updated at bedside regarding plan of care and waiting for bed availability at [facility]    Dispo: pending transfer to [facility] for medical intervention, papers in chart    Case was discussed with attending, Dr. Licea DVT ppx: on eliquis    FULL Code  Palliative consult appreciated    Pt and family updated at bedside regarding plan of care and waiting for bed availability at [facility]    Dispo: pending transfer to [facility] for medical intervention, papers in chart    Case was discussed with attending, Dr. Licea DVT ppx: on eliquis    FULL Code  Palliative consult appreciated  Pt and family updated at bedside regarding plan of care and waiting for bed availability at [facility]  Dispo: pending transfer to [facility] for medical intervention, papers in chart  Case was discussed with attending, Dr. Licea DVT ppx: on eliquis    FULL Code  Palliative consult appreciated  Pt and family updated at bedside regarding plan of care and waiting for bed availability at [facility]  Dispo: pending transfer to [facility] for medical intervention, papers in chart  Case was discussed with attending, Dr. Licea DVT ppx: on eliquis    FULL Code  Palliative consult appreciated  Pt and family updated at bedside regarding plan of care and waiting for bed availability at [facility]  Dispo: pending transfer to [facility] for medical intervention, papers in chart  As of 12/31/23, family updated on bed status and in agreement with continuos monitoring in-house  Case was discussed with attending, Dr. Licea DVT ppx: on eliquis    FULL Code  Palliative consult appreciated  Pt and family updated at bedside regarding plan of care and waiting for bed availability at [facility]  Dispo: pending transfer to [facility] for medical intervention, papers in chart  As of 12/31/23, family updated on bed status and in agreement with continuos monitoring in-house  Case was discussed with attending, Dr. Licea

## 2023-12-31 NOTE — PROVIDER CONTACT NOTE (OTHER) - SITUATION
Notified MD that patient was showing elevated BPM. MD advised to closely monitor and to contact if patient stays above 105BPM consistently.

## 2023-12-31 NOTE — PROGRESS NOTE ADULT - PROBLEM SELECTOR PLAN 6
-BLE open blisters not improving  -Wound care recs appreaciated: adaptic dressing applied to wounds and changed every 3 days. Use bacitracin everyday over the wounds and ACE wrap both legs to help decrease swelling

## 2023-12-31 NOTE — PROGRESS NOTE ADULT - ASSESSMENT
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

## 2023-12-31 NOTE — PROGRESS NOTE ADULT - SUBJECTIVE AND OBJECTIVE BOX
Follow-up Pulmonary Progress Note  Chief Complaint : Urticaria      patient seen and examined  comfortable  on room air   no LE edema        Allergies :No Known Allergies      PAST MEDICAL & SURGICAL HISTORY:  Afib    Congestive heart failure (CHF)    CVA (cerebral vascular accident)    Hypertension, unspecified type    Chronic obstructive pulmonary disease (COPD)    No significant past surgical history        Medications:  MEDICATIONS  (STANDING):  allopurinol 100 milliGRAM(s) Oral daily  apixaban 2.5 milliGRAM(s) Oral every 12 hours  aspirin enteric coated 81 milliGRAM(s) Oral daily  atorvastatin 40 milliGRAM(s) Oral at bedtime  bacitracin   Ointment 1 Application(s) Topical daily  budesonide 160 MICROgram(s)/formoterol 4.5 MICROgram(s) Inhaler 2 Puff(s) Inhalation two times a day  melatonin 3 milliGRAM(s) Oral at bedtime  metoprolol succinate ER 50 milliGRAM(s) Oral daily  pantoprazole    Tablet 40 milliGRAM(s) Oral before breakfast  polyethylene glycol 3350 17 Gram(s) Oral two times a day  senna 2 Tablet(s) Oral at bedtime  tiotropium 2.5 MICROgram(s) Inhaler 2 Puff(s) Inhalation daily    MEDICATIONS  (PRN):  acetaminophen     Tablet .. 650 milliGRAM(s) Oral every 6 hours PRN Temp greater or equal to 38C (100.4F), Mild Pain (1 - 3)  albuterol    90 MICROgram(s) HFA Inhaler 2 Puff(s) Inhalation every 6 hours PRN Shortness of Breath and/or Wheezing  diphenhydramine 2%/zinc acetate 0.1% Cream 1 Application(s) Topical two times a day PRN Itching      Antibiotics History      Heme Medications   apixaban 2.5 milliGRAM(s) Oral every 12 hours, 12-20-23 @ 14:46  aspirin enteric coated 81 milliGRAM(s) Oral daily, 12-18-23 @ 14:40      GI Medications  pantoprazole    Tablet 40 milliGRAM(s) Oral before breakfast, 12-18-23 @ 14:42, Routine  polyethylene glycol 3350 17 Gram(s) Oral two times a day, 12-19-23 @ 08:53,   senna 2 Tablet(s) Oral at bedtime, 12-18-23 @ 14:17, Routine        LABS:                        9.1    7.25  )-----------( 196      ( 31 Dec 2023 09:28 )             29.1     12-31    142  |  102  |  95<H>  ----------------------------<  145<H>  4.1   |  33<H>  |  1.65<H>    Ca    9.1      31 Dec 2023 09:28    TPro  7.2  /  Alb  2.5<L>  /  TBili  0.6  /  DBili  x   /  AST  22  /  ALT  19  /  AlkPhos  62  12-30     Trend Bun/Cr  12-31-23 @ 09:28  BUN/CR -  95<H> / 1.65<H>  12-30-23 @ 07:30  BUN/CR -  104<H> / 1.60<H>  12-29-23 @ 07:13  BUN/CR -  109<H> / 1.68<H>  12-28-23 @ 05:00  BUN/CR -  105<H> / 1.71<H>  12-27-23 @ 05:33  BUN/CR -  103<H> / 1.95<H>  12-26-23 @ 08:52  BUN/CR -  110<H> / 1.77<H>  12-25-23 @ 05:15  BUN/CR -  111<H> / 1.89<H>  12-24-23 @ 06:09  BUN/CR -  110<H> / 2.06<H>  12-23-23 @ 09:23  BUN/CR -  107<H> / 1.98<H>  12-22-23 @ 07:24  BUN/CR -  105<H> / 2.05<H>  12-21-23 @ 07:56  BUN/CR -  93<H> / 2.03<H>  12-20-23 @ 05:57  BUN/CR -  83<H> / 2.09<H>      RADIOLOGY  CXR:   < from: Xray Chest 1 View- PORTABLE-Urgent (Xray Chest 1 View- PORTABLE-Urgent .) (12.27.23 @ 15:46) >  ACC: [id number] EXAM:  XR CHEST PORTABLE URGENT 1V   ORDERED BY: SAL RAMSAY     PROCEDURE DATE:  12/27/2023      INTERPRETATION:  AP semierect chest on December 27, 2023 at 2:56 PM.   Patient is being followed for left effusion.    Heart size difficult to assess. Large left effusion again noted.    Chest is similar to December 23.    IMPRESSION: Persistent large left effusion.    --- End of Report ---      < end of copied text >      VITALS:  T(C): 36.2 (12-31-23 @ 12:49), Max: 36.7 (12-31-23 @ 05:44)  T(F): 97.2 (12-31-23 @ 12:49), Max: 98.1 (12-31-23 @ 05:44)  HR: 72 (12-31-23 @ 12:49) (60 - 87)  BP: 113/76 (12-31-23 @ 12:49) (113/76 - 154/88)  BP(mean): --  ABP: --  ABP(mean): --  RR: 18 (12-31-23 @ 12:49) (17 - 18)  SpO2: 94% (12-31-23 @ 12:49) (91% - 96%)  CVP(mm Hg): --  CVP(cm H2O): --    Ins and Outs     12-30-23 @ 07:01  -  12-31-23 @ 07:00  --------------------------------------------------------  IN: 0 mL / OUT: 550 mL / NET: -550 mL                I&O's Detail    30 Dec 2023 07:01  -  31 Dec 2023 07:00  --------------------------------------------------------  IN:  Total IN: 0 mL    OUT:    Voided (mL): 550 mL  Total OUT: 550 mL    Total NET: -550 mL        Follow-up Pulmonary Progress Note  Chief Complaint : Urticaria      patient seen and examined  comfortable  on room air   no LE edema        Allergies :No Known Allergies      PAST MEDICAL & SURGICAL HISTORY:  Afib    Congestive heart failure (CHF)    CVA (cerebral vascular accident)    Hypertension, unspecified type    Chronic obstructive pulmonary disease (COPD)    No significant past surgical history        Medications:  MEDICATIONS  (STANDING):  allopurinol 100 milliGRAM(s) Oral daily  apixaban 2.5 milliGRAM(s) Oral every 12 hours  aspirin enteric coated 81 milliGRAM(s) Oral daily  atorvastatin 40 milliGRAM(s) Oral at bedtime  bacitracin   Ointment 1 Application(s) Topical daily  budesonide 160 MICROgram(s)/formoterol 4.5 MICROgram(s) Inhaler 2 Puff(s) Inhalation two times a day  melatonin 3 milliGRAM(s) Oral at bedtime  metoprolol succinate ER 50 milliGRAM(s) Oral daily  pantoprazole    Tablet 40 milliGRAM(s) Oral before breakfast  polyethylene glycol 3350 17 Gram(s) Oral two times a day  senna 2 Tablet(s) Oral at bedtime  tiotropium 2.5 MICROgram(s) Inhaler 2 Puff(s) Inhalation daily    MEDICATIONS  (PRN):  acetaminophen     Tablet .. 650 milliGRAM(s) Oral every 6 hours PRN Temp greater or equal to 38C (100.4F), Mild Pain (1 - 3)  albuterol    90 MICROgram(s) HFA Inhaler 2 Puff(s) Inhalation every 6 hours PRN Shortness of Breath and/or Wheezing  diphenhydramine 2%/zinc acetate 0.1% Cream 1 Application(s) Topical two times a day PRN Itching      Antibiotics History      Heme Medications   apixaban 2.5 milliGRAM(s) Oral every 12 hours, 12-20-23 @ 14:46  aspirin enteric coated 81 milliGRAM(s) Oral daily, 12-18-23 @ 14:40      GI Medications  pantoprazole    Tablet 40 milliGRAM(s) Oral before breakfast, 12-18-23 @ 14:42, Routine  polyethylene glycol 3350 17 Gram(s) Oral two times a day, 12-19-23 @ 08:53,   senna 2 Tablet(s) Oral at bedtime, 12-18-23 @ 14:17, Routine        LABS:                        9.1    7.25  )-----------( 196      ( 31 Dec 2023 09:28 )             29.1     12-31    142  |  102  |  95<H>  ----------------------------<  145<H>  4.1   |  33<H>  |  1.65<H>    Ca    9.1      31 Dec 2023 09:28    TPro  7.2  /  Alb  2.5<L>  /  TBili  0.6  /  DBili  x   /  AST  22  /  ALT  19  /  AlkPhos  62  12-30     Trend Bun/Cr  12-31-23 @ 09:28  BUN/CR -  95<H> / 1.65<H>  12-30-23 @ 07:30  BUN/CR -  104<H> / 1.60<H>  12-29-23 @ 07:13  BUN/CR -  109<H> / 1.68<H>  12-28-23 @ 05:00  BUN/CR -  105<H> / 1.71<H>  12-27-23 @ 05:33  BUN/CR -  103<H> / 1.95<H>  12-26-23 @ 08:52  BUN/CR -  110<H> / 1.77<H>  12-25-23 @ 05:15  BUN/CR -  111<H> / 1.89<H>  12-24-23 @ 06:09  BUN/CR -  110<H> / 2.06<H>  12-23-23 @ 09:23  BUN/CR -  107<H> / 1.98<H>  12-22-23 @ 07:24  BUN/CR -  105<H> / 2.05<H>  12-21-23 @ 07:56  BUN/CR -  93<H> / 2.03<H>  12-20-23 @ 05:57  BUN/CR -  83<H> / 2.09<H>      RADIOLOGY  CXR:   < from: Xray Chest 1 View- PORTABLE-Urgent (Xray Chest 1 View- PORTABLE-Urgent .) (12.27.23 @ 15:46) >  ACC: [id number] EXAM:  XR CHEST PORTABLE URGENT 1V   ORDERED BY: SAL RAMSAY     PROCEDURE DATE:  12/27/2023      INTERPRETATION:  AP semierect chest on December 27, 2023 at 2:56 PM.   Patient is being followed for left effusion.    Heart size difficult to assess. Large left effusion again noted.    Chest is similar to December 23.    IMPRESSION: Persistent large left effusion.    --- End of Report ---      < end of copied text >      VITALS:  T(C): 36.2 (12-31-23 @ 12:49), Max: 36.7 (12-31-23 @ 05:44)  T(F): 97.2 (12-31-23 @ 12:49), Max: 98.1 (12-31-23 @ 05:44)  HR: 72 (12-31-23 @ 12:49) (60 - 87)  BP: 113/76 (12-31-23 @ 12:49) (113/76 - 154/88)  BP(mean): --  ABP: --  ABP(mean): --  RR: 18 (12-31-23 @ 12:49) (17 - 18)  SpO2: 94% (12-31-23 @ 12:49) (91% - 96%)  CVP(mm Hg): --  CVP(cm H2O): --    Ins and Outs     12-30-23 @ 07:01  -  12-31-23 @ 07:00  --------------------------------------------------------  IN: 0 mL / OUT: 550 mL / NET: -550 mL                I&O's Detail    30 Dec 2023 07:01  -  31 Dec 2023 07:00  --------------------------------------------------------  IN:  Total IN: 0 mL    OUT:    Voided (mL): 550 mL  Total OUT: 550 mL    Total NET: -550 mL

## 2023-12-31 NOTE — PROGRESS NOTE ADULT - PROBLEM SELECTOR PLAN 2
-s/p thoracentesis with 1 L removal during last hospitalization  -cytopath negative for malignancy. Suspect 2/2 HF exacerbation   -pulm recs appreciated  -pending transfer to [facility] for pleuroscopy and pleurx cath placement; accepting Dr. Guerrero and thoracic surgeon Dr. Bhat -s/p thoracentesis with 1 L removal during last hospitalization  -cytopath negative for malignancy. Suspect 2/2 HF exacerbation   -pulm recs appreciated  -pending transfer to [facility] for pleuroscopy and pleurx cath placement; accepting Dr. Guerrero and thoracic surgeon Dr. Bhat -S/P thoracentesis with 1 L removal during last hospitalization  -Cytopath negative for malignancy. Suspect 2/2 HF exacerbation   -Pulm recs appreciated  -Pending transfer to [facility] for pleuroscopy and pleurx cath placement; accepting Dr. Guerrero and thoracic surgeon Dr. Bhat -S/P thoracentesis with 1 L removal during last hospitalization  -Cytopath negative for malignancy. Suspect 2/2 HF exacerbation   -Pulm recs appreciated  -Pending transfer to [facility] for pleuroscopy and pleurx cath placement; accepting Dr. Guerrero and thoracic surgeon Dr. Bhat

## 2023-12-31 NOTE — PROGRESS NOTE ADULT - ASSESSMENT
Physical Examination:  GENERAL:               Alert, Oriented, No acute distress.    HEENT:                   No JVD, Moist MM, fresh blood in left nare  PULM:                     Bilateral air entry diminished on left,  No Rales, No Rhonchi, No Wheezing  CVS:                         S1, S2,  +murmur  ABD:                        Soft, nondistended, nontender, normoactive bowel sounds,   EXT:                         +edema with blisters , nontender,   NEURO:                  Alert, oriented, interactive, nonfocal, follows commands  PSYC:                      Calm, + Insight.      Assessment  ERV  Dyspnea suspect due to acute on chronic Diastolic CHF and Chronic Left pleural effusions  s/p thoracentesis 11/24/23 and 12/19/2023    Exudative and 12/19- bloody  Afib on a/c  Cigar smoker, at risk for copd.   Worsening renal function on CKD    Plan  pleural effusion recurrent - appears exudative and  bloody effusion  , now cytology negative  will need pleuroscopy and thoracic eval possible pleurex   awaiting transfer to [facility]  monitor on room air  out of bed as tolerated  optimize cardiac meds as per Cardio  Rest of care as per primary team.   Physical Examination:  GENERAL:               Alert, Oriented, No acute distress.    HEENT:                   No JVD, Moist MM, fresh blood in left nare  PULM:                     Bilateral air entry diminished on left,  No Rales, No Rhonchi, No Wheezing  CVS:                         S1, S2,  +murmur  ABD:                        Soft, nondistended, nontender, normoactive bowel sounds,   EXT:                         +edema with blisters , nontender,   NEURO:                  Alert, oriented, interactive, nonfocal, follows commands  PSYC:                      Calm, + Insight.      Assessment  ERV  Dyspnea suspect due to acute on chronic Diastolic CHF and Chronic Left pleural effusions  s/p thoracentesis 11/24/23 and 12/19/2023    Exudative and 12/19- bloody  Afib on a/c  Cigar smoker, at risk for copd.   Worsening renal function on CKD    Plan  pleural effusion recurrent - appears exudative and  bloody effusion  , now cytology negative  will need pleuroscopy and thoracic eval possible pleurex   awaiting transfer to [facility]  monitor on room air  out of bed as tolerated  optimize cardiac meds as per Cardio  Rest of care as per primary team.

## 2023-12-31 NOTE — PROGRESS NOTE ADULT - PROBLEM SELECTOR PLAN 5
-not in exacerbation  -target SpO2 88-92%. Avoid hyperoxygenation   -c/w spiriva, Symbicort and albuterol PRN  - pulm recommendations appreciated -Not in exacerbation  -Target SpO2 88-92%. Avoid hyperoxygenation   -C/w spiriva, Symbicort and albuterol PRN  -Pulm recommendations appreciated  -Continue monitor on room air

## 2024-01-01 ENCOUNTER — TRANSCRIPTION ENCOUNTER (OUTPATIENT)
Age: 89
End: 2024-01-01

## 2024-01-01 ENCOUNTER — INPATIENT (INPATIENT)
Facility: HOSPITAL | Age: 89
LOS: 13 days | End: 2024-03-01
Attending: STUDENT IN AN ORGANIZED HEALTH CARE EDUCATION/TRAINING PROGRAM | Admitting: STUDENT IN AN ORGANIZED HEALTH CARE EDUCATION/TRAINING PROGRAM
Payer: MEDICARE

## 2024-01-01 ENCOUNTER — APPOINTMENT (OUTPATIENT)
Dept: PULMONOLOGY | Facility: CLINIC | Age: 89
End: 2024-01-01

## 2024-01-01 ENCOUNTER — RESULT REVIEW (OUTPATIENT)
Age: 89
End: 2024-01-01

## 2024-01-01 ENCOUNTER — APPOINTMENT (OUTPATIENT)
Age: 89
End: 2024-01-01
Payer: MEDICARE

## 2024-01-01 ENCOUNTER — INPATIENT (INPATIENT)
Facility: HOSPITAL | Age: 89
LOS: 3 days | Discharge: HOME HEALTH | End: 2024-01-05
Attending: INTERNAL MEDICINE | Admitting: INTERNAL MEDICINE
Payer: MEDICARE

## 2024-01-01 ENCOUNTER — INPATIENT (INPATIENT)
Facility: HOSPITAL | Age: 89
LOS: 9 days | Discharge: ANOTHER HOSPITAL | DRG: 291 | End: 2024-02-16
Attending: STUDENT IN AN ORGANIZED HEALTH CARE EDUCATION/TRAINING PROGRAM | Admitting: STUDENT IN AN ORGANIZED HEALTH CARE EDUCATION/TRAINING PROGRAM
Payer: MEDICARE

## 2024-01-01 VITALS
DIASTOLIC BLOOD PRESSURE: 70 MMHG | OXYGEN SATURATION: 94 % | HEART RATE: 66 BPM | TEMPERATURE: 97.1 F | SYSTOLIC BLOOD PRESSURE: 122 MMHG | RESPIRATION RATE: 16 BRPM

## 2024-01-01 VITALS
RESPIRATION RATE: 18 BRPM | TEMPERATURE: 97 F | SYSTOLIC BLOOD PRESSURE: 130 MMHG | OXYGEN SATURATION: 100 % | HEART RATE: 64 BPM | DIASTOLIC BLOOD PRESSURE: 70 MMHG

## 2024-01-01 VITALS
HEIGHT: 66 IN | TEMPERATURE: 97 F | HEART RATE: 62 BPM | WEIGHT: 154.98 LBS | OXYGEN SATURATION: 90 % | DIASTOLIC BLOOD PRESSURE: 74 MMHG | SYSTOLIC BLOOD PRESSURE: 166 MMHG | RESPIRATION RATE: 14 BRPM

## 2024-01-01 VITALS
OXYGEN SATURATION: 94 % | SYSTOLIC BLOOD PRESSURE: 136 MMHG | TEMPERATURE: 97 F | DIASTOLIC BLOOD PRESSURE: 76 MMHG | RESPIRATION RATE: 16 BRPM | HEART RATE: 80 BPM

## 2024-01-01 VITALS
RESPIRATION RATE: 17 BRPM | HEART RATE: 70 BPM | SYSTOLIC BLOOD PRESSURE: 126 MMHG | OXYGEN SATURATION: 98 % | DIASTOLIC BLOOD PRESSURE: 60 MMHG | TEMPERATURE: 98 F

## 2024-01-01 VITALS
TEMPERATURE: 98 F | OXYGEN SATURATION: 100 % | RESPIRATION RATE: 18 BRPM | HEART RATE: 70 BPM | DIASTOLIC BLOOD PRESSURE: 74 MMHG | SYSTOLIC BLOOD PRESSURE: 143 MMHG

## 2024-01-01 VITALS — OXYGEN SATURATION: 97 %

## 2024-01-01 VITALS
SYSTOLIC BLOOD PRESSURE: 132 MMHG | DIASTOLIC BLOOD PRESSURE: 54 MMHG | OXYGEN SATURATION: 94 % | HEART RATE: 62 BPM | RESPIRATION RATE: 17 BRPM | TEMPERATURE: 97 F

## 2024-01-01 DIAGNOSIS — T14.8XXA OTHER INJURY OF UNSPECIFIED BODY REGION, INITIAL ENCOUNTER: ICD-10-CM

## 2024-01-01 DIAGNOSIS — Z91.89 OTHER SPECIFIED PERSONAL RISK FACTORS, NOT ELSEWHERE CLASSIFIED: ICD-10-CM

## 2024-01-01 DIAGNOSIS — Z51.5 ENCOUNTER FOR PALLIATIVE CARE: ICD-10-CM

## 2024-01-01 DIAGNOSIS — J44.9 CHRONIC OBSTRUCTIVE PULMONARY DISEASE, UNSPECIFIED: ICD-10-CM

## 2024-01-01 DIAGNOSIS — J90 PLEURAL EFFUSION, NOT ELSEWHERE CLASSIFIED: ICD-10-CM

## 2024-01-01 DIAGNOSIS — I50.9 HEART FAILURE, UNSPECIFIED: ICD-10-CM

## 2024-01-01 DIAGNOSIS — N18.30 CHRONIC KIDNEY DISEASE, STAGE 3 UNSPECIFIED: ICD-10-CM

## 2024-01-01 DIAGNOSIS — N63.0 UNSPECIFIED LUMP IN UNSPECIFIED BREAST: ICD-10-CM

## 2024-01-01 DIAGNOSIS — Z45.09 ENCOUNTER FOR ADJUSTMENT AND MANAGEMENT OF OTHER CARDIAC DEVICE: ICD-10-CM

## 2024-01-01 DIAGNOSIS — Z29.9 ENCOUNTER FOR PROPHYLACTIC MEASURES, UNSPECIFIED: ICD-10-CM

## 2024-01-01 DIAGNOSIS — Z71.89 OTHER SPECIFIED COUNSELING: ICD-10-CM

## 2024-01-01 DIAGNOSIS — J96.01 ACUTE RESPIRATORY FAILURE WITH HYPOXIA: ICD-10-CM

## 2024-01-01 DIAGNOSIS — I73.9 PERIPHERAL VASCULAR DISEASE, UNSPECIFIED: ICD-10-CM

## 2024-01-01 DIAGNOSIS — I48.91 UNSPECIFIED ATRIAL FIBRILLATION: ICD-10-CM

## 2024-01-01 DIAGNOSIS — J18.9 PNEUMONIA, UNSPECIFIED ORGANISM: ICD-10-CM

## 2024-01-01 DIAGNOSIS — U07.1 COVID-19: ICD-10-CM

## 2024-01-01 DIAGNOSIS — N17.9 ACUTE KIDNEY FAILURE, UNSPECIFIED: ICD-10-CM

## 2024-01-01 DIAGNOSIS — Z46.82 ENCOUNTER FOR FITTING AND ADJUSTMENT OF NON-VASCULAR CATHETER: ICD-10-CM

## 2024-01-01 DIAGNOSIS — R74.01 ELEVATION OF LEVELS OF LIVER TRANSAMINASE LEVELS: ICD-10-CM

## 2024-01-01 DIAGNOSIS — Z86.73 PERSONAL HISTORY OF TRANSIENT ISCHEMIC ATTACK (TIA), AND CEREBRAL INFARCTION W/OUT RESIDUAL DEFICITS: ICD-10-CM

## 2024-01-01 DIAGNOSIS — D53.9 NUTRITIONAL ANEMIA, UNSPECIFIED: ICD-10-CM

## 2024-01-01 DIAGNOSIS — R74.8 ABNORMAL LEVELS OF OTHER SERUM ENZYMES: ICD-10-CM

## 2024-01-01 DIAGNOSIS — I50.43 ACUTE ON CHRONIC COMBINED SYSTOLIC (CONGESTIVE) AND DIASTOLIC (CONGESTIVE) HEART FAILURE: ICD-10-CM

## 2024-01-01 DIAGNOSIS — K81.0 ACUTE CHOLECYSTITIS: ICD-10-CM

## 2024-01-01 DIAGNOSIS — R22.2 LOCALIZED SWELLING, MASS AND LUMP, TRUNK: ICD-10-CM

## 2024-01-01 DIAGNOSIS — R13.10 DYSPHAGIA, UNSPECIFIED: ICD-10-CM

## 2024-01-01 LAB
A1C WITH ESTIMATED AVERAGE GLUCOSE RESULT: 5.8 % — HIGH (ref 4–5.6)
A1C WITH ESTIMATED AVERAGE GLUCOSE RESULT: 5.8 % — HIGH (ref 4–5.6)
A1C WITH ESTIMATED AVERAGE GLUCOSE RESULT: 5.9 % — HIGH (ref 4–5.6)
ACANTHOCYTES BLD QL SMEAR: SLIGHT — SIGNIFICANT CHANGE UP
ADD ON TEST-SPECIMEN IN LAB: SIGNIFICANT CHANGE UP
ALBUMIN FLD-MCNC: 1.9 G/DL — SIGNIFICANT CHANGE UP
ALBUMIN FLD-MCNC: 2.4 G/DL — SIGNIFICANT CHANGE UP
ALBUMIN SERPL ELPH-MCNC: 2.6 G/DL — LOW (ref 3.3–5)
ALBUMIN SERPL ELPH-MCNC: 2.9 G/DL — LOW (ref 3.3–5)
ALBUMIN SERPL ELPH-MCNC: 3 G/DL — LOW (ref 3.3–5)
ALBUMIN SERPL ELPH-MCNC: 3.3 G/DL — SIGNIFICANT CHANGE UP (ref 3.3–5)
ALBUMIN SERPL ELPH-MCNC: 3.5 G/DL — SIGNIFICANT CHANGE UP (ref 3.3–5)
ALBUMIN SERPL ELPH-MCNC: 3.5 G/DL — SIGNIFICANT CHANGE UP (ref 3.3–5)
ALBUMIN SERPL ELPH-MCNC: 3.6 G/DL — SIGNIFICANT CHANGE UP (ref 3.3–5)
ALBUMIN SERPL ELPH-MCNC: 3.6 G/DL — SIGNIFICANT CHANGE UP (ref 3.3–5)
ALP SERPL-CCNC: 151 U/L — HIGH (ref 40–120)
ALP SERPL-CCNC: 151 U/L — HIGH (ref 40–120)
ALP SERPL-CCNC: 169 U/L — HIGH (ref 40–120)
ALP SERPL-CCNC: 169 U/L — HIGH (ref 40–120)
ALP SERPL-CCNC: 183 U/L — HIGH (ref 40–120)
ALP SERPL-CCNC: 183 U/L — HIGH (ref 40–120)
ALP SERPL-CCNC: 198 U/L — HIGH (ref 40–120)
ALP SERPL-CCNC: 198 U/L — HIGH (ref 40–120)
ALP SERPL-CCNC: 211 U/L — HIGH (ref 40–120)
ALP SERPL-CCNC: 211 U/L — HIGH (ref 40–120)
ALP SERPL-CCNC: 216 U/L — HIGH (ref 40–120)
ALP SERPL-CCNC: 216 U/L — HIGH (ref 40–120)
ALP SERPL-CCNC: 68 U/L — SIGNIFICANT CHANGE UP (ref 40–120)
ALP SERPL-CCNC: 73 U/L — SIGNIFICANT CHANGE UP (ref 40–120)
ALP SERPL-CCNC: 74 U/L — SIGNIFICANT CHANGE UP (ref 40–120)
ALT FLD-CCNC: 10 U/L — SIGNIFICANT CHANGE UP (ref 10–45)
ALT FLD-CCNC: 11 U/L — SIGNIFICANT CHANGE UP (ref 10–45)
ALT FLD-CCNC: 125 U/L — HIGH (ref 4–41)
ALT FLD-CCNC: 125 U/L — HIGH (ref 4–41)
ALT FLD-CCNC: 16 U/L — SIGNIFICANT CHANGE UP (ref 4–41)
ALT FLD-CCNC: 161 U/L — HIGH (ref 10–45)
ALT FLD-CCNC: 161 U/L — HIGH (ref 10–45)
ALT FLD-CCNC: 165 U/L — HIGH (ref 4–41)
ALT FLD-CCNC: 165 U/L — HIGH (ref 4–41)
ALT FLD-CCNC: 172 U/L — HIGH (ref 4–41)
ALT FLD-CCNC: 172 U/L — HIGH (ref 4–41)
ALT FLD-CCNC: 68 U/L — HIGH (ref 4–41)
ALT FLD-CCNC: 68 U/L — HIGH (ref 4–41)
ALT FLD-CCNC: 94 U/L — HIGH (ref 4–41)
ALT FLD-CCNC: 94 U/L — HIGH (ref 4–41)
ANION GAP SERPL CALC-SCNC: 10 MMOL/L — SIGNIFICANT CHANGE UP (ref 5–17)
ANION GAP SERPL CALC-SCNC: 10 MMOL/L — SIGNIFICANT CHANGE UP (ref 7–14)
ANION GAP SERPL CALC-SCNC: 11 MMOL/L — SIGNIFICANT CHANGE UP (ref 7–14)
ANION GAP SERPL CALC-SCNC: 13 MMOL/L — SIGNIFICANT CHANGE UP (ref 7–14)
ANION GAP SERPL CALC-SCNC: 13 MMOL/L — SIGNIFICANT CHANGE UP (ref 7–14)
ANION GAP SERPL CALC-SCNC: 14 MMOL/L — SIGNIFICANT CHANGE UP (ref 7–14)
ANION GAP SERPL CALC-SCNC: 15 MMOL/L — HIGH (ref 7–14)
ANION GAP SERPL CALC-SCNC: 16 MMOL/L — HIGH (ref 7–14)
ANION GAP SERPL CALC-SCNC: 16 MMOL/L — HIGH (ref 7–14)
ANION GAP SERPL CALC-SCNC: 4 MMOL/L — LOW (ref 5–17)
ANION GAP SERPL CALC-SCNC: 6 MMOL/L — LOW (ref 7–14)
ANION GAP SERPL CALC-SCNC: 6 MMOL/L — SIGNIFICANT CHANGE UP (ref 5–17)
ANION GAP SERPL CALC-SCNC: 7 MMOL/L — SIGNIFICANT CHANGE UP (ref 5–17)
ANION GAP SERPL CALC-SCNC: 8 MMOL/L — SIGNIFICANT CHANGE UP (ref 5–17)
ANION GAP SERPL CALC-SCNC: 8 MMOL/L — SIGNIFICANT CHANGE UP (ref 5–17)
ANION GAP SERPL CALC-SCNC: 9 MMOL/L — SIGNIFICANT CHANGE UP (ref 5–17)
ANISOCYTOSIS BLD QL: SLIGHT — SIGNIFICANT CHANGE UP
APPEARANCE UR: CLEAR — SIGNIFICANT CHANGE UP
APPEARANCE UR: CLEAR — SIGNIFICANT CHANGE UP
APTT BLD: 101.5 SEC — HIGH (ref 24.5–35.6)
APTT BLD: 103.8 SEC — HIGH (ref 24.5–35.6)
APTT BLD: 107.8 SEC — SIGNIFICANT CHANGE UP (ref 24.5–35.6)
APTT BLD: 28.3 SEC — SIGNIFICANT CHANGE UP (ref 24.5–35.6)
APTT BLD: 32.4 SEC — SIGNIFICANT CHANGE UP (ref 24.5–35.6)
APTT BLD: 32.4 SEC — SIGNIFICANT CHANGE UP (ref 24.5–35.6)
APTT BLD: 33.1 SEC — SIGNIFICANT CHANGE UP (ref 24.5–35.6)
APTT BLD: 34.8 SEC — SIGNIFICANT CHANGE UP (ref 24.5–35.6)
APTT BLD: 34.8 SEC — SIGNIFICANT CHANGE UP (ref 24.5–35.6)
APTT BLD: 48.7 SEC — HIGH (ref 24.5–35.6)
APTT BLD: 50.3 SEC — HIGH (ref 24.5–35.6)
APTT BLD: 56.1 SEC — HIGH (ref 24.5–35.6)
APTT BLD: 59.9 SEC — HIGH (ref 24.5–35.6)
APTT BLD: 61.7 SEC — HIGH (ref 24.5–35.6)
APTT BLD: 66.7 SEC — HIGH (ref 24.5–35.6)
APTT BLD: 66.7 SEC — HIGH (ref 24.5–35.6)
APTT BLD: 70.1 SEC — HIGH (ref 24.5–35.6)
APTT BLD: 71.6 SEC — HIGH (ref 24.5–35.6)
APTT BLD: 72.5 SEC — HIGH (ref 24.5–35.6)
APTT BLD: 74.6 SEC — HIGH (ref 24.5–35.6)
APTT BLD: 76.8 SEC — HIGH (ref 24.5–35.6)
APTT BLD: 76.9 SEC — HIGH (ref 24.5–35.6)
APTT BLD: 77.4 SEC — HIGH (ref 24.5–35.6)
APTT BLD: 78 SEC — HIGH (ref 24.5–35.6)
APTT BLD: 80.9 SEC — HIGH (ref 24.5–35.6)
APTT BLD: 81 SEC — HIGH (ref 24.5–35.6)
APTT BLD: 83 SEC — HIGH (ref 24.5–35.6)
APTT BLD: 83.2 SEC — HIGH (ref 24.5–35.6)
APTT BLD: 84.1 SEC — HIGH (ref 24.5–35.6)
APTT BLD: 84.3 SEC — HIGH (ref 24.5–35.6)
APTT BLD: 89.9 SEC — HIGH (ref 24.5–35.6)
APTT BLD: 91.9 SEC — HIGH (ref 24.5–35.6)
APTT BLD: 94.5 SEC — HIGH (ref 24.5–35.6)
APTT BLD: 99.9 SEC — HIGH (ref 24.5–35.6)
AST SERPL-CCNC: 105 U/L — HIGH (ref 4–40)
AST SERPL-CCNC: 105 U/L — HIGH (ref 4–40)
AST SERPL-CCNC: 17 U/L — SIGNIFICANT CHANGE UP (ref 10–40)
AST SERPL-CCNC: 197 U/L — HIGH (ref 4–40)
AST SERPL-CCNC: 22 U/L — SIGNIFICANT CHANGE UP (ref 10–40)
AST SERPL-CCNC: 25 U/L — SIGNIFICANT CHANGE UP (ref 4–40)
AST SERPL-CCNC: 316 U/L — HIGH (ref 10–40)
AST SERPL-CCNC: 316 U/L — HIGH (ref 10–40)
AST SERPL-CCNC: 42 U/L — HIGH (ref 4–40)
AST SERPL-CCNC: 42 U/L — HIGH (ref 4–40)
AST SERPL-CCNC: 62 U/L — HIGH (ref 4–40)
AST SERPL-CCNC: 62 U/L — HIGH (ref 4–40)
B PERT DNA SPEC QL NAA+PROBE: SIGNIFICANT CHANGE UP
B PERT IGG+IGM PNL SER: ABNORMAL
B PERT IGG+IGM PNL SER: ABNORMAL
B PERT+PARAPERT DNA PNL SPEC NAA+PROBE: SIGNIFICANT CHANGE UP
BACTERIA # UR AUTO: NEGATIVE /HPF — SIGNIFICANT CHANGE UP
BACTERIA # UR AUTO: NEGATIVE /HPF — SIGNIFICANT CHANGE UP
BASE EXCESS BLDV CALC-SCNC: 5.9 MMOL/L — HIGH (ref -2–3)
BASE EXCESS BLDV CALC-SCNC: 7.7 MMOL/L — HIGH (ref -2–3)
BASE EXCESS BLDV CALC-SCNC: 8.7 MMOL/L — HIGH (ref -2–3)
BASOPHILS # BLD AUTO: 0 K/UL — SIGNIFICANT CHANGE UP (ref 0–0.2)
BASOPHILS # BLD AUTO: 0.03 K/UL — SIGNIFICANT CHANGE UP (ref 0–0.2)
BASOPHILS # BLD AUTO: 0.04 K/UL — SIGNIFICANT CHANGE UP (ref 0–0.2)
BASOPHILS # BLD AUTO: 0.05 K/UL — SIGNIFICANT CHANGE UP (ref 0–0.2)
BASOPHILS # BLD AUTO: 0.05 K/UL — SIGNIFICANT CHANGE UP (ref 0–0.2)
BASOPHILS NFR BLD AUTO: 0 % — SIGNIFICANT CHANGE UP (ref 0–2)
BASOPHILS NFR BLD AUTO: 0.2 % — SIGNIFICANT CHANGE UP (ref 0–2)
BASOPHILS NFR BLD AUTO: 0.3 % — SIGNIFICANT CHANGE UP (ref 0–2)
BASOPHILS NFR BLD AUTO: 0.4 % — SIGNIFICANT CHANGE UP (ref 0–2)
BASOPHILS NFR BLD AUTO: 0.5 % — SIGNIFICANT CHANGE UP (ref 0–2)
BILIRUB SERPL-MCNC: 0.4 MG/DL — SIGNIFICANT CHANGE UP (ref 0.2–1.2)
BILIRUB SERPL-MCNC: 0.5 MG/DL — SIGNIFICANT CHANGE UP (ref 0.2–1.2)
BILIRUB SERPL-MCNC: 0.7 MG/DL — SIGNIFICANT CHANGE UP (ref 0.2–1.2)
BILIRUB SERPL-MCNC: 0.8 MG/DL — SIGNIFICANT CHANGE UP (ref 0.2–1.2)
BILIRUB SERPL-MCNC: 0.8 MG/DL — SIGNIFICANT CHANGE UP (ref 0.2–1.2)
BILIRUB SERPL-MCNC: 1.1 MG/DL — SIGNIFICANT CHANGE UP (ref 0.2–1.2)
BILIRUB SERPL-MCNC: 1.1 MG/DL — SIGNIFICANT CHANGE UP (ref 0.2–1.2)
BILIRUB UR-MCNC: NEGATIVE — SIGNIFICANT CHANGE UP
BILIRUB UR-MCNC: NEGATIVE — SIGNIFICANT CHANGE UP
BLOOD GAS ARTERIAL COMPREHENSIVE RESULT: SIGNIFICANT CHANGE UP
BLOOD GAS ARTERIAL COMPREHENSIVE RESULT: SIGNIFICANT CHANGE UP
BLOOD GAS VENOUS COMPREHENSIVE RESULT: SIGNIFICANT CHANGE UP
BORDETELLA PARAPERTUSSIS (RAPRVP): SIGNIFICANT CHANGE UP
BP 180, S: <2 RU/ML — SIGNIFICANT CHANGE UP
BP 180, S: <2 RU/ML — SIGNIFICANT CHANGE UP
BP 230, S: <2 RU/ML — SIGNIFICANT CHANGE UP
BP 230, S: <2 RU/ML — SIGNIFICANT CHANGE UP
BUN SERPL-MCNC: 102 MG/DL — HIGH (ref 7–23)
BUN SERPL-MCNC: 102 MG/DL — HIGH (ref 7–23)
BUN SERPL-MCNC: 106 MG/DL — HIGH (ref 7–23)
BUN SERPL-MCNC: 106 MG/DL — HIGH (ref 7–23)
BUN SERPL-MCNC: 112 MG/DL — HIGH (ref 7–23)
BUN SERPL-MCNC: 117 MG/DL — HIGH (ref 7–23)
BUN SERPL-MCNC: 117 MG/DL — HIGH (ref 7–23)
BUN SERPL-MCNC: 132 MG/DL — HIGH (ref 7–23)
BUN SERPL-MCNC: 42 MG/DL — HIGH (ref 7–23)
BUN SERPL-MCNC: 42 MG/DL — HIGH (ref 7–23)
BUN SERPL-MCNC: 43 MG/DL — HIGH (ref 7–23)
BUN SERPL-MCNC: 44 MG/DL — HIGH (ref 7–23)
BUN SERPL-MCNC: 48 MG/DL — HIGH (ref 7–23)
BUN SERPL-MCNC: 49 MG/DL — HIGH (ref 7–23)
BUN SERPL-MCNC: 53 MG/DL — HIGH (ref 7–23)
BUN SERPL-MCNC: 54 MG/DL — HIGH (ref 7–23)
BUN SERPL-MCNC: 55 MG/DL — HIGH (ref 7–23)
BUN SERPL-MCNC: 56 MG/DL — HIGH (ref 7–23)
BUN SERPL-MCNC: 58 MG/DL — HIGH (ref 7–23)
BUN SERPL-MCNC: 58 MG/DL — HIGH (ref 7–23)
BUN SERPL-MCNC: 71 MG/DL — HIGH (ref 7–23)
BUN SERPL-MCNC: 78 MG/DL — HIGH (ref 7–23)
BUN SERPL-MCNC: 84 MG/DL — HIGH (ref 7–23)
BUN SERPL-MCNC: 84 MG/DL — HIGH (ref 7–23)
BUN SERPL-MCNC: 86 MG/DL — HIGH (ref 7–23)
BUN SERPL-MCNC: 86 MG/DL — HIGH (ref 7–23)
BUN SERPL-MCNC: 87 MG/DL — HIGH (ref 7–23)
BUN SERPL-MCNC: 89 MG/DL — HIGH (ref 7–23)
BUN SERPL-MCNC: 89 MG/DL — HIGH (ref 7–23)
BUN SERPL-MCNC: 91 MG/DL — HIGH (ref 7–23)
BUN SERPL-MCNC: 92 MG/DL — HIGH (ref 7–23)
BUN SERPL-MCNC: 92 MG/DL — HIGH (ref 7–23)
BUN SERPL-MCNC: 94 MG/DL — HIGH (ref 7–23)
BUN SERPL-MCNC: 94 MG/DL — HIGH (ref 7–23)
C PNEUM DNA SPEC QL NAA+PROBE: SIGNIFICANT CHANGE UP
CA-I SERPL-SCNC: 1.18 MMOL/L — SIGNIFICANT CHANGE UP (ref 1.15–1.33)
CALCIUM SERPL-MCNC: 8.4 MG/DL — SIGNIFICANT CHANGE UP (ref 8.4–10.5)
CALCIUM SERPL-MCNC: 8.4 MG/DL — SIGNIFICANT CHANGE UP (ref 8.4–10.5)
CALCIUM SERPL-MCNC: 8.5 MG/DL — SIGNIFICANT CHANGE UP (ref 8.4–10.5)
CALCIUM SERPL-MCNC: 8.6 MG/DL — SIGNIFICANT CHANGE UP (ref 8.4–10.5)
CALCIUM SERPL-MCNC: 8.7 MG/DL — SIGNIFICANT CHANGE UP (ref 8.4–10.5)
CALCIUM SERPL-MCNC: 8.8 MG/DL — SIGNIFICANT CHANGE UP (ref 8.4–10.5)
CALCIUM SERPL-MCNC: 8.9 MG/DL — SIGNIFICANT CHANGE UP (ref 8.4–10.5)
CALCIUM SERPL-MCNC: 9 MG/DL — SIGNIFICANT CHANGE UP (ref 8.4–10.5)
CALCIUM SERPL-MCNC: 9.1 MG/DL — SIGNIFICANT CHANGE UP (ref 8.4–10.5)
CALCIUM SERPL-MCNC: 9.2 MG/DL — SIGNIFICANT CHANGE UP (ref 8.4–10.5)
CALCIUM SERPL-MCNC: 9.3 MG/DL — SIGNIFICANT CHANGE UP (ref 8.4–10.5)
CALCIUM SERPL-MCNC: 9.4 MG/DL — SIGNIFICANT CHANGE UP (ref 8.4–10.5)
CALCIUM SERPL-MCNC: 9.6 MG/DL — SIGNIFICANT CHANGE UP (ref 8.4–10.5)
CAST: 1 /LPF — SIGNIFICANT CHANGE UP (ref 0–4)
CAST: 1 /LPF — SIGNIFICANT CHANGE UP (ref 0–4)
CHLORIDE BLDV-SCNC: 105 MMOL/L — SIGNIFICANT CHANGE UP (ref 96–108)
CHLORIDE BLDV-SCNC: 99 MMOL/L — SIGNIFICANT CHANGE UP (ref 96–108)
CHLORIDE SERPL-SCNC: 100 MMOL/L — SIGNIFICANT CHANGE UP (ref 96–108)
CHLORIDE SERPL-SCNC: 100 MMOL/L — SIGNIFICANT CHANGE UP (ref 98–107)
CHLORIDE SERPL-SCNC: 100 MMOL/L — SIGNIFICANT CHANGE UP (ref 98–107)
CHLORIDE SERPL-SCNC: 101 MMOL/L — SIGNIFICANT CHANGE UP (ref 96–108)
CHLORIDE SERPL-SCNC: 101 MMOL/L — SIGNIFICANT CHANGE UP (ref 96–108)
CHLORIDE SERPL-SCNC: 101 MMOL/L — SIGNIFICANT CHANGE UP (ref 98–107)
CHLORIDE SERPL-SCNC: 102 MMOL/L — SIGNIFICANT CHANGE UP (ref 96–108)
CHLORIDE SERPL-SCNC: 102 MMOL/L — SIGNIFICANT CHANGE UP (ref 96–108)
CHLORIDE SERPL-SCNC: 103 MMOL/L — SIGNIFICANT CHANGE UP (ref 96–108)
CHLORIDE SERPL-SCNC: 103 MMOL/L — SIGNIFICANT CHANGE UP (ref 96–108)
CHLORIDE SERPL-SCNC: 103 MMOL/L — SIGNIFICANT CHANGE UP (ref 98–107)
CHLORIDE SERPL-SCNC: 94 MMOL/L — LOW (ref 98–107)
CHLORIDE SERPL-SCNC: 94 MMOL/L — LOW (ref 98–107)
CHLORIDE SERPL-SCNC: 95 MMOL/L — LOW (ref 98–107)
CHLORIDE SERPL-SCNC: 96 MMOL/L — LOW (ref 98–107)
CHLORIDE SERPL-SCNC: 97 MMOL/L — LOW (ref 98–107)
CHLORIDE SERPL-SCNC: 97 MMOL/L — SIGNIFICANT CHANGE UP (ref 96–108)
CHLORIDE SERPL-SCNC: 98 MMOL/L — SIGNIFICANT CHANGE UP (ref 96–108)
CHLORIDE SERPL-SCNC: 98 MMOL/L — SIGNIFICANT CHANGE UP (ref 98–107)
CHLORIDE SERPL-SCNC: 99 MMOL/L — SIGNIFICANT CHANGE UP (ref 98–107)
CHOLEST SERPL-MCNC: 103 MG/DL — SIGNIFICANT CHANGE UP
CHOLEST SERPL-MCNC: 91 MG/DL — SIGNIFICANT CHANGE UP
CHOLEST SERPL-MCNC: 91 MG/DL — SIGNIFICANT CHANGE UP
CO2 BLDV-SCNC: 38 MMOL/L — HIGH (ref 22–26)
CO2 BLDV-SCNC: 38.5 MMOL/L — HIGH (ref 22–26)
CO2 BLDV-SCNC: 39.6 MMOL/L — HIGH (ref 22–26)
CO2 SERPL-SCNC: 30 MMOL/L — SIGNIFICANT CHANGE UP (ref 22–31)
CO2 SERPL-SCNC: 31 MMOL/L — SIGNIFICANT CHANGE UP (ref 22–31)
CO2 SERPL-SCNC: 32 MMOL/L — HIGH (ref 22–31)
CO2 SERPL-SCNC: 33 MMOL/L — HIGH (ref 22–31)
CO2 SERPL-SCNC: 34 MMOL/L — HIGH (ref 22–31)
CO2 SERPL-SCNC: 36 MMOL/L — HIGH (ref 22–31)
CO2 SERPL-SCNC: 37 MMOL/L — HIGH (ref 22–31)
CO2 SERPL-SCNC: 37 MMOL/L — HIGH (ref 22–31)
CO2 SERPL-SCNC: 38 MMOL/L — HIGH (ref 22–31)
COLOR FLD: ABNORMAL
COLOR FLD: SIGNIFICANT CHANGE UP
COLOR SPEC: YELLOW — SIGNIFICANT CHANGE UP
COLOR SPEC: YELLOW — SIGNIFICANT CHANGE UP
CREAT SERPL-MCNC: 1.33 MG/DL — HIGH (ref 0.5–1.3)
CREAT SERPL-MCNC: 1.39 MG/DL — HIGH (ref 0.5–1.3)
CREAT SERPL-MCNC: 1.41 MG/DL — HIGH (ref 0.5–1.3)
CREAT SERPL-MCNC: 1.5 MG/DL — HIGH (ref 0.5–1.3)
CREAT SERPL-MCNC: 1.52 MG/DL — HIGH (ref 0.5–1.3)
CREAT SERPL-MCNC: 1.52 MG/DL — HIGH (ref 0.5–1.3)
CREAT SERPL-MCNC: 1.54 MG/DL — HIGH (ref 0.5–1.3)
CREAT SERPL-MCNC: 1.54 MG/DL — HIGH (ref 0.5–1.3)
CREAT SERPL-MCNC: 1.56 MG/DL — HIGH (ref 0.5–1.3)
CREAT SERPL-MCNC: 1.56 MG/DL — HIGH (ref 0.5–1.3)
CREAT SERPL-MCNC: 1.58 MG/DL — HIGH (ref 0.5–1.3)
CREAT SERPL-MCNC: 1.59 MG/DL — HIGH (ref 0.5–1.3)
CREAT SERPL-MCNC: 1.6 MG/DL — HIGH (ref 0.5–1.3)
CREAT SERPL-MCNC: 1.61 MG/DL — HIGH (ref 0.5–1.3)
CREAT SERPL-MCNC: 1.61 MG/DL — HIGH (ref 0.5–1.3)
CREAT SERPL-MCNC: 1.66 MG/DL — HIGH (ref 0.5–1.3)
CREAT SERPL-MCNC: 1.7 MG/DL — HIGH (ref 0.5–1.3)
CREAT SERPL-MCNC: 1.72 MG/DL — HIGH (ref 0.5–1.3)
CREAT SERPL-MCNC: 1.77 MG/DL — HIGH (ref 0.5–1.3)
CREAT SERPL-MCNC: 1.78 MG/DL — HIGH (ref 0.5–1.3)
CREAT SERPL-MCNC: 1.8 MG/DL — HIGH (ref 0.5–1.3)
CREAT SERPL-MCNC: 1.87 MG/DL — HIGH (ref 0.5–1.3)
CREAT SERPL-MCNC: 1.89 MG/DL — HIGH (ref 0.5–1.3)
CREAT SERPL-MCNC: 2.17 MG/DL — HIGH (ref 0.5–1.3)
CREAT SERPL-MCNC: 2.3 MG/DL — HIGH (ref 0.5–1.3)
CREAT SERPL-MCNC: 2.45 MG/DL — HIGH (ref 0.5–1.3)
CREAT SERPL-MCNC: 2.6 MG/DL — HIGH (ref 0.5–1.3)
CREAT SERPL-MCNC: 2.68 MG/DL — HIGH (ref 0.5–1.3)
CREAT SERPL-MCNC: 2.72 MG/DL — HIGH (ref 0.5–1.3)
CREAT SERPL-MCNC: 2.9 MG/DL — HIGH (ref 0.5–1.3)
CREAT SERPL-MCNC: 3.65 MG/DL — HIGH (ref 0.5–1.3)
CULTURE RESULTS: ABNORMAL
CULTURE RESULTS: NO GROWTH — SIGNIFICANT CHANGE UP
CULTURE RESULTS: SIGNIFICANT CHANGE UP
DACRYOCYTES BLD QL SMEAR: SLIGHT — SIGNIFICANT CHANGE UP
DIFF PNL FLD: NEGATIVE — SIGNIFICANT CHANGE UP
DIFF PNL FLD: NEGATIVE — SIGNIFICANT CHANGE UP
EGFR: 15 ML/MIN/1.73M2 — LOW
EGFR: 20 ML/MIN/1.73M2 — LOW
EGFR: 22 ML/MIN/1.73M2 — LOW
EGFR: 22 ML/MIN/1.73M2 — LOW
EGFR: 23 ML/MIN/1.73M2 — LOW
EGFR: 24 ML/MIN/1.73M2 — LOW
EGFR: 26 ML/MIN/1.73M2 — LOW
EGFR: 28 ML/MIN/1.73M2 — LOW
EGFR: 33 ML/MIN/1.73M2 — LOW
EGFR: 34 ML/MIN/1.73M2 — LOW
EGFR: 35 ML/MIN/1.73M2 — LOW
EGFR: 36 ML/MIN/1.73M2 — LOW
EGFR: 36 ML/MIN/1.73M2 — LOW
EGFR: 37 ML/MIN/1.73M2 — LOW
EGFR: 38 ML/MIN/1.73M2 — LOW
EGFR: 39 ML/MIN/1.73M2 — LOW
EGFR: 41 ML/MIN/1.73M2 — LOW
EGFR: 42 ML/MIN/1.73M2 — LOW
EGFR: 42 ML/MIN/1.73M2 — LOW
EGFR: 43 ML/MIN/1.73M2 — LOW
EGFR: 44 ML/MIN/1.73M2 — LOW
EGFR: 47 ML/MIN/1.73M2 — LOW
EGFR: 48 ML/MIN/1.73M2 — LOW
EGFR: 51 ML/MIN/1.73M2 — LOW
ELLIPTOCYTES BLD QL SMEAR: SIGNIFICANT CHANGE UP
EOSINOPHIL # BLD AUTO: 0 K/UL — SIGNIFICANT CHANGE UP (ref 0–0.5)
EOSINOPHIL # BLD AUTO: 0.01 K/UL — SIGNIFICANT CHANGE UP (ref 0–0.5)
EOSINOPHIL # BLD AUTO: 0.01 K/UL — SIGNIFICANT CHANGE UP (ref 0–0.5)
EOSINOPHIL # BLD AUTO: 0.02 K/UL — SIGNIFICANT CHANGE UP (ref 0–0.5)
EOSINOPHIL # BLD AUTO: 0.07 K/UL — SIGNIFICANT CHANGE UP (ref 0–0.5)
EOSINOPHIL # BLD AUTO: 0.22 K/UL — SIGNIFICANT CHANGE UP (ref 0–0.5)
EOSINOPHIL # BLD AUTO: 0.24 K/UL — SIGNIFICANT CHANGE UP (ref 0–0.5)
EOSINOPHIL # BLD AUTO: 0.4 K/UL — SIGNIFICANT CHANGE UP (ref 0–0.5)
EOSINOPHIL # BLD AUTO: 0.4 K/UL — SIGNIFICANT CHANGE UP (ref 0–0.5)
EOSINOPHIL # BLD AUTO: 0.41 K/UL — SIGNIFICANT CHANGE UP (ref 0–0.5)
EOSINOPHIL # BLD AUTO: 0.43 K/UL — SIGNIFICANT CHANGE UP (ref 0–0.5)
EOSINOPHIL # FLD: 1 % — SIGNIFICANT CHANGE UP
EOSINOPHIL NFR BLD AUTO: 0 % — SIGNIFICANT CHANGE UP (ref 0–6)
EOSINOPHIL NFR BLD AUTO: 0.1 % — SIGNIFICANT CHANGE UP (ref 0–6)
EOSINOPHIL NFR BLD AUTO: 0.1 % — SIGNIFICANT CHANGE UP (ref 0–6)
EOSINOPHIL NFR BLD AUTO: 0.3 % — SIGNIFICANT CHANGE UP (ref 0–6)
EOSINOPHIL NFR BLD AUTO: 0.9 % — SIGNIFICANT CHANGE UP (ref 0–6)
EOSINOPHIL NFR BLD AUTO: 2.5 % — SIGNIFICANT CHANGE UP (ref 0–6)
EOSINOPHIL NFR BLD AUTO: 3 % — SIGNIFICANT CHANGE UP (ref 0–6)
EOSINOPHIL NFR BLD AUTO: 4.5 % — SIGNIFICANT CHANGE UP (ref 0–6)
EOSINOPHIL NFR BLD AUTO: 4.5 % — SIGNIFICANT CHANGE UP (ref 0–6)
EOSINOPHIL NFR BLD AUTO: 5 % — SIGNIFICANT CHANGE UP (ref 0–6)
EOSINOPHIL NFR BLD AUTO: 5 % — SIGNIFICANT CHANGE UP (ref 0–6)
EOSINOPHIL NFR BLD AUTO: 6 % — SIGNIFICANT CHANGE UP (ref 0–6)
EOSINOPHIL NFR BLD AUTO: 6 % — SIGNIFICANT CHANGE UP (ref 0–6)
ESTIMATED AVERAGE GLUCOSE: 120 — SIGNIFICANT CHANGE UP
ESTIMATED AVERAGE GLUCOSE: 120 — SIGNIFICANT CHANGE UP
ESTIMATED AVERAGE GLUCOSE: 123 MG/DL — HIGH (ref 68–114)
FERRITIN SERPL-MCNC: 298 NG/ML — SIGNIFICANT CHANGE UP (ref 30–400)
FERRITIN SERPL-MCNC: 298 NG/ML — SIGNIFICANT CHANGE UP (ref 30–400)
FLUAV SUBTYP SPEC NAA+PROBE: SIGNIFICANT CHANGE UP
FLUBV RNA SPEC QL NAA+PROBE: SIGNIFICANT CHANGE UP
FLUID INTAKE SUBSTANCE CLASS: SIGNIFICANT CHANGE UP
FLUID INTAKE SUBSTANCE CLASS: SIGNIFICANT CHANGE UP
FOLATE SERPL-MCNC: 20 NG/ML — HIGH (ref 3.1–17.5)
FOLATE SERPL-MCNC: 20 NG/ML — HIGH (ref 3.1–17.5)
GAS PNL BLDA: SIGNIFICANT CHANGE UP
GAS PNL BLDA: SIGNIFICANT CHANGE UP
GAS PNL BLDV: 136 MMOL/L — SIGNIFICANT CHANGE UP (ref 136–145)
GAS PNL BLDV: 144 MMOL/L — SIGNIFICANT CHANGE UP (ref 136–145)
GAS PNL BLDV: SIGNIFICANT CHANGE UP
GAS PNL BLDV: SIGNIFICANT CHANGE UP
GGT SERPL-CCNC: 164 U/L — HIGH (ref 8–61)
GGT SERPL-CCNC: 164 U/L — HIGH (ref 8–61)
GIANT PLATELETS BLD QL SMEAR: PRESENT — SIGNIFICANT CHANGE UP
GLUCOSE BLDC GLUCOMTR-MCNC: 100 MG/DL — HIGH (ref 70–99)
GLUCOSE BLDC GLUCOMTR-MCNC: 117 MG/DL — HIGH (ref 70–99)
GLUCOSE BLDC GLUCOMTR-MCNC: 118 MG/DL — HIGH (ref 70–99)
GLUCOSE BLDC GLUCOMTR-MCNC: 121 MG/DL — HIGH (ref 70–99)
GLUCOSE BLDC GLUCOMTR-MCNC: 127 MG/DL — HIGH (ref 70–99)
GLUCOSE BLDC GLUCOMTR-MCNC: 142 MG/DL — HIGH (ref 70–99)
GLUCOSE BLDC GLUCOMTR-MCNC: 149 MG/DL — HIGH (ref 70–99)
GLUCOSE BLDC GLUCOMTR-MCNC: 160 MG/DL — HIGH (ref 70–99)
GLUCOSE BLDC GLUCOMTR-MCNC: 227 MG/DL — HIGH (ref 70–99)
GLUCOSE BLDV-MCNC: 110 MG/DL — HIGH (ref 70–99)
GLUCOSE BLDV-MCNC: 144 MG/DL — HIGH (ref 70–99)
GLUCOSE FLD-MCNC: 105 MG/DL — SIGNIFICANT CHANGE UP
GLUCOSE FLD-MCNC: 113 MG/DL — SIGNIFICANT CHANGE UP
GLUCOSE SERPL-MCNC: 102 MG/DL — HIGH (ref 70–99)
GLUCOSE SERPL-MCNC: 104 MG/DL — HIGH (ref 70–99)
GLUCOSE SERPL-MCNC: 105 MG/DL — HIGH (ref 70–99)
GLUCOSE SERPL-MCNC: 106 MG/DL — HIGH (ref 70–99)
GLUCOSE SERPL-MCNC: 106 MG/DL — HIGH (ref 70–99)
GLUCOSE SERPL-MCNC: 108 MG/DL — HIGH (ref 70–99)
GLUCOSE SERPL-MCNC: 109 MG/DL — HIGH (ref 70–99)
GLUCOSE SERPL-MCNC: 110 MG/DL — HIGH (ref 70–99)
GLUCOSE SERPL-MCNC: 112 MG/DL — HIGH (ref 70–99)
GLUCOSE SERPL-MCNC: 113 MG/DL — HIGH (ref 70–99)
GLUCOSE SERPL-MCNC: 114 MG/DL — HIGH (ref 70–99)
GLUCOSE SERPL-MCNC: 119 MG/DL — HIGH (ref 70–99)
GLUCOSE SERPL-MCNC: 120 MG/DL — HIGH (ref 70–99)
GLUCOSE SERPL-MCNC: 121 MG/DL — HIGH (ref 70–99)
GLUCOSE SERPL-MCNC: 122 MG/DL — HIGH (ref 70–99)
GLUCOSE SERPL-MCNC: 128 MG/DL — HIGH (ref 70–99)
GLUCOSE SERPL-MCNC: 129 MG/DL — HIGH (ref 70–99)
GLUCOSE SERPL-MCNC: 131 MG/DL — HIGH (ref 70–99)
GLUCOSE SERPL-MCNC: 134 MG/DL — HIGH (ref 70–99)
GLUCOSE SERPL-MCNC: 134 MG/DL — HIGH (ref 70–99)
GLUCOSE SERPL-MCNC: 139 MG/DL — HIGH (ref 70–99)
GLUCOSE SERPL-MCNC: 155 MG/DL — HIGH (ref 70–99)
GLUCOSE SERPL-MCNC: 82 MG/DL — SIGNIFICANT CHANGE UP (ref 70–99)
GLUCOSE SERPL-MCNC: 82 MG/DL — SIGNIFICANT CHANGE UP (ref 70–99)
GLUCOSE SERPL-MCNC: 83 MG/DL — SIGNIFICANT CHANGE UP (ref 70–99)
GLUCOSE SERPL-MCNC: 83 MG/DL — SIGNIFICANT CHANGE UP (ref 70–99)
GLUCOSE SERPL-MCNC: 84 MG/DL — SIGNIFICANT CHANGE UP (ref 70–99)
GLUCOSE SERPL-MCNC: 86 MG/DL — SIGNIFICANT CHANGE UP (ref 70–99)
GLUCOSE SERPL-MCNC: 92 MG/DL — SIGNIFICANT CHANGE UP (ref 70–99)
GLUCOSE SERPL-MCNC: 92 MG/DL — SIGNIFICANT CHANGE UP (ref 70–99)
GLUCOSE SERPL-MCNC: 93 MG/DL — SIGNIFICANT CHANGE UP (ref 70–99)
GLUCOSE SERPL-MCNC: 97 MG/DL — SIGNIFICANT CHANGE UP (ref 70–99)
GLUCOSE SERPL-MCNC: 97 MG/DL — SIGNIFICANT CHANGE UP (ref 70–99)
GLUCOSE UR QL: NEGATIVE MG/DL — SIGNIFICANT CHANGE UP
GLUCOSE UR QL: NEGATIVE MG/DL — SIGNIFICANT CHANGE UP
GRAM STN FLD: ABNORMAL
GRAM STN FLD: SIGNIFICANT CHANGE UP
GRAM STN FLD: SIGNIFICANT CHANGE UP
HADV DNA SPEC QL NAA+PROBE: SIGNIFICANT CHANGE UP
HCO3 BLDV-SCNC: 36 MMOL/L — HIGH (ref 22–29)
HCO3 BLDV-SCNC: 36 MMOL/L — HIGH (ref 22–29)
HCO3 BLDV-SCNC: 37 MMOL/L — HIGH (ref 22–29)
HCOV 229E RNA SPEC QL NAA+PROBE: SIGNIFICANT CHANGE UP
HCOV HKU1 RNA SPEC QL NAA+PROBE: SIGNIFICANT CHANGE UP
HCOV NL63 RNA SPEC QL NAA+PROBE: SIGNIFICANT CHANGE UP
HCOV OC43 RNA SPEC QL NAA+PROBE: SIGNIFICANT CHANGE UP
HCT VFR BLD CALC: 28.7 % — LOW (ref 39–50)
HCT VFR BLD CALC: 28.7 % — LOW (ref 39–50)
HCT VFR BLD CALC: 28.9 % — LOW (ref 39–50)
HCT VFR BLD CALC: 28.9 % — LOW (ref 39–50)
HCT VFR BLD CALC: 29.5 % — LOW (ref 39–50)
HCT VFR BLD CALC: 29.5 % — LOW (ref 39–50)
HCT VFR BLD CALC: 29.8 % — LOW (ref 39–50)
HCT VFR BLD CALC: 29.9 % — LOW (ref 39–50)
HCT VFR BLD CALC: 30 % — LOW (ref 39–50)
HCT VFR BLD CALC: 30 % — LOW (ref 39–50)
HCT VFR BLD CALC: 30.1 % — LOW (ref 39–50)
HCT VFR BLD CALC: 30.1 % — LOW (ref 39–50)
HCT VFR BLD CALC: 30.2 % — LOW (ref 39–50)
HCT VFR BLD CALC: 30.2 % — LOW (ref 39–50)
HCT VFR BLD CALC: 31.5 % — LOW (ref 39–50)
HCT VFR BLD CALC: 31.6 % — LOW (ref 39–50)
HCT VFR BLD CALC: 31.6 % — LOW (ref 39–50)
HCT VFR BLD CALC: 31.8 % — LOW (ref 39–50)
HCT VFR BLD CALC: 31.9 % — LOW (ref 39–50)
HCT VFR BLD CALC: 31.9 % — LOW (ref 39–50)
HCT VFR BLD CALC: 32 % — LOW (ref 39–50)
HCT VFR BLD CALC: 32.2 % — LOW (ref 39–50)
HCT VFR BLD CALC: 32.4 % — LOW (ref 39–50)
HCT VFR BLD CALC: 32.6 % — LOW (ref 39–50)
HCT VFR BLD CALC: 32.7 % — LOW (ref 39–50)
HCT VFR BLD CALC: 33.2 % — LOW (ref 39–50)
HCT VFR BLD CALC: 33.4 % — LOW (ref 39–50)
HCT VFR BLD CALC: 33.5 % — LOW (ref 39–50)
HCT VFR BLD CALC: 33.6 % — LOW (ref 39–50)
HCT VFR BLD CALC: 33.8 % — LOW (ref 39–50)
HCT VFR BLD CALC: 33.9 % — LOW (ref 39–50)
HCT VFR BLD CALC: 34 % — LOW (ref 39–50)
HCT VFR BLD CALC: 34.3 % — LOW (ref 39–50)
HCT VFR BLD CALC: 34.7 % — LOW (ref 39–50)
HCT VFR BLD CALC: 36.6 % — LOW (ref 39–50)
HCT VFR BLD CALC: 36.8 % — LOW (ref 39–50)
HCT VFR BLD CALC: 37.1 % — LOW (ref 39–50)
HCT VFR BLDA CALC: 33 % — LOW (ref 39–51)
HCT VFR BLDA CALC: 36 % — LOW (ref 39–51)
HDLC SERPL-MCNC: 48 MG/DL — SIGNIFICANT CHANGE UP
HDLC SERPL-MCNC: 51 MG/DL — SIGNIFICANT CHANGE UP
HDLC SERPL-MCNC: 51 MG/DL — SIGNIFICANT CHANGE UP
HGB BLD CALC-MCNC: 11 G/DL — LOW (ref 12.6–17.4)
HGB BLD CALC-MCNC: 12 G/DL — LOW (ref 12.6–17.4)
HGB BLD-MCNC: 10 G/DL — LOW (ref 13–17)
HGB BLD-MCNC: 10.1 G/DL — LOW (ref 13–17)
HGB BLD-MCNC: 10.1 G/DL — LOW (ref 13–17)
HGB BLD-MCNC: 10.2 G/DL — LOW (ref 13–17)
HGB BLD-MCNC: 10.2 G/DL — LOW (ref 13–17)
HGB BLD-MCNC: 10.3 G/DL — LOW (ref 13–17)
HGB BLD-MCNC: 10.3 G/DL — LOW (ref 13–17)
HGB BLD-MCNC: 10.4 G/DL — LOW (ref 13–17)
HGB BLD-MCNC: 10.6 G/DL — LOW (ref 13–17)
HGB BLD-MCNC: 10.7 G/DL — LOW (ref 13–17)
HGB BLD-MCNC: 10.9 G/DL — LOW (ref 13–17)
HGB BLD-MCNC: 10.9 G/DL — LOW (ref 13–17)
HGB BLD-MCNC: 11.2 G/DL — LOW (ref 13–17)
HGB BLD-MCNC: 11.6 G/DL — LOW (ref 13–17)
HGB BLD-MCNC: 11.6 G/DL — LOW (ref 13–17)
HGB BLD-MCNC: 8.8 G/DL — LOW (ref 13–17)
HGB BLD-MCNC: 8.8 G/DL — LOW (ref 13–17)
HGB BLD-MCNC: 9.2 G/DL — LOW (ref 13–17)
HGB BLD-MCNC: 9.2 G/DL — LOW (ref 13–17)
HGB BLD-MCNC: 9.3 G/DL — LOW (ref 13–17)
HGB BLD-MCNC: 9.4 G/DL — LOW (ref 13–17)
HGB BLD-MCNC: 9.4 G/DL — LOW (ref 13–17)
HGB BLD-MCNC: 9.6 G/DL — LOW (ref 13–17)
HGB BLD-MCNC: 9.6 G/DL — LOW (ref 13–17)
HGB BLD-MCNC: 9.7 G/DL — LOW (ref 13–17)
HGB BLD-MCNC: 9.7 G/DL — LOW (ref 13–17)
HGB BLD-MCNC: 9.8 G/DL — LOW (ref 13–17)
HGB BLD-MCNC: 9.9 G/DL — LOW (ref 13–17)
HGB BLD-MCNC: 9.9 G/DL — LOW (ref 13–17)
HMPV RNA SPEC QL NAA+PROBE: SIGNIFICANT CHANGE UP
HPIV1 RNA SPEC QL NAA+PROBE: SIGNIFICANT CHANGE UP
HPIV2 RNA SPEC QL NAA+PROBE: SIGNIFICANT CHANGE UP
HPIV3 RNA SPEC QL NAA+PROBE: SIGNIFICANT CHANGE UP
HPIV4 RNA SPEC QL NAA+PROBE: SIGNIFICANT CHANGE UP
IANC: 14.17 K/UL — HIGH (ref 1.8–7.4)
IANC: 14.46 K/UL — HIGH (ref 1.8–7.4)
IANC: 15.28 K/UL — HIGH (ref 1.8–7.4)
IANC: 4.28 K/UL — SIGNIFICANT CHANGE UP (ref 1.8–7.4)
IANC: 4.28 K/UL — SIGNIFICANT CHANGE UP (ref 1.8–7.4)
IANC: 4.63 K/UL — SIGNIFICANT CHANGE UP (ref 1.8–7.4)
IANC: 4.74 K/UL — SIGNIFICANT CHANGE UP (ref 1.8–7.4)
IANC: 5.31 K/UL — SIGNIFICANT CHANGE UP (ref 1.8–7.4)
IANC: 5.31 K/UL — SIGNIFICANT CHANGE UP (ref 1.8–7.4)
IANC: 5.54 K/UL — SIGNIFICANT CHANGE UP (ref 1.8–7.4)
IANC: 5.69 K/UL — SIGNIFICANT CHANGE UP (ref 1.8–7.4)
IANC: 5.77 K/UL — SIGNIFICANT CHANGE UP (ref 1.8–7.4)
IANC: 6.21 K/UL — SIGNIFICANT CHANGE UP (ref 1.8–7.4)
IANC: 9 K/UL — HIGH (ref 1.8–7.4)
IMM GRANULOCYTES NFR BLD AUTO: 0.4 % — SIGNIFICANT CHANGE UP (ref 0–0.9)
IMM GRANULOCYTES NFR BLD AUTO: 0.4 % — SIGNIFICANT CHANGE UP (ref 0–0.9)
IMM GRANULOCYTES NFR BLD AUTO: 0.5 % — SIGNIFICANT CHANGE UP (ref 0–0.9)
IMM GRANULOCYTES NFR BLD AUTO: 0.6 % — SIGNIFICANT CHANGE UP (ref 0–0.9)
IMM GRANULOCYTES NFR BLD AUTO: 0.6 % — SIGNIFICANT CHANGE UP (ref 0–0.9)
IMM GRANULOCYTES NFR BLD AUTO: 0.7 % — SIGNIFICANT CHANGE UP (ref 0–0.9)
IMM GRANULOCYTES NFR BLD AUTO: 0.8 % — SIGNIFICANT CHANGE UP (ref 0–0.9)
IMM GRANULOCYTES NFR BLD AUTO: 1.3 % — HIGH (ref 0–0.9)
IMM GRANULOCYTES NFR BLD AUTO: 1.5 % — HIGH (ref 0–0.9)
IMM GRANULOCYTES NFR BLD AUTO: 1.8 % — HIGH (ref 0–0.9)
IMM GRANULOCYTES NFR BLD AUTO: 1.9 % — HIGH (ref 0–0.9)
IMM GRANULOCYTES NFR BLD AUTO: 2.3 % — HIGH (ref 0–0.9)
INR BLD: 1.09 RATIO — SIGNIFICANT CHANGE UP (ref 0.85–1.18)
INR BLD: 1.15 RATIO — SIGNIFICANT CHANGE UP (ref 0.85–1.18)
INR BLD: 1.15 RATIO — SIGNIFICANT CHANGE UP (ref 0.85–1.18)
INR BLD: 1.18 RATIO — SIGNIFICANT CHANGE UP (ref 0.85–1.18)
INR BLD: 1.18 RATIO — SIGNIFICANT CHANGE UP (ref 0.85–1.18)
INR BLD: 1.57 RATIO — HIGH (ref 0.85–1.18)
IRON SATN MFR SERPL: 21 % — SIGNIFICANT CHANGE UP (ref 14–50)
IRON SATN MFR SERPL: 21 % — SIGNIFICANT CHANGE UP (ref 14–50)
IRON SATN MFR SERPL: 58 UG/DL — SIGNIFICANT CHANGE UP (ref 45–165)
IRON SATN MFR SERPL: 58 UG/DL — SIGNIFICANT CHANGE UP (ref 45–165)
KETONES UR-MCNC: NEGATIVE MG/DL — SIGNIFICANT CHANGE UP
KETONES UR-MCNC: NEGATIVE MG/DL — SIGNIFICANT CHANGE UP
LACTATE BLDV-MCNC: 1 MMOL/L — SIGNIFICANT CHANGE UP (ref 0.5–2)
LACTATE BLDV-MCNC: 2.1 MMOL/L — HIGH (ref 0.5–2)
LACTATE SERPL-SCNC: 1.6 MMOL/L — SIGNIFICANT CHANGE UP (ref 0.5–2)
LDH SERPL L TO P-CCNC: 124 U/L — SIGNIFICANT CHANGE UP
LDH SERPL L TO P-CCNC: 142 U/L — SIGNIFICANT CHANGE UP
LDH SERPL L TO P-CCNC: 173 U/L — SIGNIFICANT CHANGE UP (ref 50–242)
LEGIONELLA AG UR QL: NEGATIVE — SIGNIFICANT CHANGE UP
LEUKOCYTE ESTERASE UR-ACNC: NEGATIVE — SIGNIFICANT CHANGE UP
LEUKOCYTE ESTERASE UR-ACNC: NEGATIVE — SIGNIFICANT CHANGE UP
LIDOCAIN IGE QN: 39 U/L — SIGNIFICANT CHANGE UP (ref 7–60)
LIDOCAIN IGE QN: 39 U/L — SIGNIFICANT CHANGE UP (ref 7–60)
LIPID PNL WITH DIRECT LDL SERPL: 37 MG/DL — SIGNIFICANT CHANGE UP
LIPID PNL WITH DIRECT LDL SERPL: 37 MG/DL — SIGNIFICANT CHANGE UP
LIPID PNL WITH DIRECT LDL SERPL: 41 MG/DL — SIGNIFICANT CHANGE UP
LYMPHOCYTES # BLD AUTO: 0.53 K/UL — LOW (ref 1–3.3)
LYMPHOCYTES # BLD AUTO: 0.95 K/UL — LOW (ref 1–3.3)
LYMPHOCYTES # BLD AUTO: 0.96 K/UL — LOW (ref 1–3.3)
LYMPHOCYTES # BLD AUTO: 0.96 K/UL — LOW (ref 1–3.3)
LYMPHOCYTES # BLD AUTO: 1.07 K/UL — SIGNIFICANT CHANGE UP (ref 1–3.3)
LYMPHOCYTES # BLD AUTO: 1.13 K/UL — SIGNIFICANT CHANGE UP (ref 1–3.3)
LYMPHOCYTES # BLD AUTO: 1.23 K/UL — SIGNIFICANT CHANGE UP (ref 1–3.3)
LYMPHOCYTES # BLD AUTO: 1.23 K/UL — SIGNIFICANT CHANGE UP (ref 1–3.3)
LYMPHOCYTES # BLD AUTO: 1.24 K/UL — SIGNIFICANT CHANGE UP (ref 1–3.3)
LYMPHOCYTES # BLD AUTO: 1.24 K/UL — SIGNIFICANT CHANGE UP (ref 1–3.3)
LYMPHOCYTES # BLD AUTO: 1.37 K/UL — SIGNIFICANT CHANGE UP (ref 1–3.3)
LYMPHOCYTES # BLD AUTO: 1.37 K/UL — SIGNIFICANT CHANGE UP (ref 1–3.3)
LYMPHOCYTES # BLD AUTO: 1.53 K/UL — SIGNIFICANT CHANGE UP (ref 1–3.3)
LYMPHOCYTES # BLD AUTO: 1.63 K/UL — SIGNIFICANT CHANGE UP (ref 1–3.3)
LYMPHOCYTES # BLD AUTO: 1.66 K/UL — SIGNIFICANT CHANGE UP (ref 1–3.3)
LYMPHOCYTES # BLD AUTO: 14.1 % — SIGNIFICANT CHANGE UP (ref 13–44)
LYMPHOCYTES # BLD AUTO: 14.3 % — SIGNIFICANT CHANGE UP (ref 13–44)
LYMPHOCYTES # BLD AUTO: 15.4 % — SIGNIFICANT CHANGE UP (ref 13–44)
LYMPHOCYTES # BLD AUTO: 16 % — SIGNIFICANT CHANGE UP (ref 13–44)
LYMPHOCYTES # BLD AUTO: 18.2 % — SIGNIFICANT CHANGE UP (ref 13–44)
LYMPHOCYTES # BLD AUTO: 19 % — SIGNIFICANT CHANGE UP (ref 13–44)
LYMPHOCYTES # BLD AUTO: 19 % — SIGNIFICANT CHANGE UP (ref 13–44)
LYMPHOCYTES # BLD AUTO: 20.2 % — SIGNIFICANT CHANGE UP (ref 13–44)
LYMPHOCYTES # BLD AUTO: 5.6 % — LOW (ref 13–44)
LYMPHOCYTES # BLD AUTO: 5.7 % — LOW (ref 13–44)
LYMPHOCYTES # BLD AUTO: 6.1 % — LOW (ref 13–44)
LYMPHOCYTES # BLD AUTO: 7 % — LOW (ref 13–44)
LYMPHOCYTES # BLD AUTO: 8.2 % — LOW (ref 13–44)
LYMPHOCYTES # FLD: 24 % — SIGNIFICANT CHANGE UP
LYMPHOCYTES # FLD: 57 % — SIGNIFICANT CHANGE UP
M PNEUMO DNA SPEC QL NAA+PROBE: SIGNIFICANT CHANGE UP
MACROCYTES BLD QL: SLIGHT — SIGNIFICANT CHANGE UP
MAGNESIUM SERPL-MCNC: 2.2 MG/DL — SIGNIFICANT CHANGE UP (ref 1.6–2.6)
MAGNESIUM SERPL-MCNC: 2.4 MG/DL — SIGNIFICANT CHANGE UP (ref 1.6–2.6)
MAGNESIUM SERPL-MCNC: 2.4 MG/DL — SIGNIFICANT CHANGE UP (ref 1.6–2.6)
MAGNESIUM SERPL-MCNC: 2.5 MG/DL — SIGNIFICANT CHANGE UP (ref 1.6–2.6)
MAGNESIUM SERPL-MCNC: 2.6 MG/DL — SIGNIFICANT CHANGE UP (ref 1.6–2.6)
MAGNESIUM SERPL-MCNC: 2.7 MG/DL — HIGH (ref 1.6–2.6)
MAGNESIUM SERPL-MCNC: 2.8 MG/DL — HIGH (ref 1.6–2.6)
MAGNESIUM SERPL-MCNC: 2.9 MG/DL — HIGH (ref 1.6–2.6)
MAGNESIUM SERPL-MCNC: 3 MG/DL — HIGH (ref 1.6–2.6)
MAGNESIUM SERPL-MCNC: 3.1 MG/DL — HIGH (ref 1.6–2.6)
MAGNESIUM SERPL-MCNC: 3.1 MG/DL — HIGH (ref 1.6–2.6)
MAGNESIUM SERPL-MCNC: 3.2 MG/DL — HIGH (ref 1.6–2.6)
MAGNESIUM SERPL-MCNC: 3.3 MG/DL — HIGH (ref 1.6–2.6)
MAGNESIUM SERPL-MCNC: 3.5 MG/DL — HIGH (ref 1.6–2.6)
MANUAL SMEAR VERIFICATION: SIGNIFICANT CHANGE UP
MCHC RBC-ENTMCNC: 29.6 GM/DL — LOW (ref 32–36)
MCHC RBC-ENTMCNC: 29.8 GM/DL — LOW (ref 32–36)
MCHC RBC-ENTMCNC: 29.9 GM/DL — LOW (ref 32–36)
MCHC RBC-ENTMCNC: 30.3 GM/DL — LOW (ref 32–36)
MCHC RBC-ENTMCNC: 30.3 PG — SIGNIFICANT CHANGE UP (ref 27–34)
MCHC RBC-ENTMCNC: 30.4 GM/DL — LOW (ref 32–36)
MCHC RBC-ENTMCNC: 30.4 GM/DL — LOW (ref 32–36)
MCHC RBC-ENTMCNC: 30.4 PG — SIGNIFICANT CHANGE UP (ref 27–34)
MCHC RBC-ENTMCNC: 30.5 PG — SIGNIFICANT CHANGE UP (ref 27–34)
MCHC RBC-ENTMCNC: 30.5 PG — SIGNIFICANT CHANGE UP (ref 27–34)
MCHC RBC-ENTMCNC: 30.6 GM/DL — LOW (ref 32–36)
MCHC RBC-ENTMCNC: 30.6 GM/DL — LOW (ref 32–36)
MCHC RBC-ENTMCNC: 30.6 PG — SIGNIFICANT CHANGE UP (ref 27–34)
MCHC RBC-ENTMCNC: 30.7 GM/DL — LOW (ref 32–36)
MCHC RBC-ENTMCNC: 30.7 PG — SIGNIFICANT CHANGE UP (ref 27–34)
MCHC RBC-ENTMCNC: 30.8 GM/DL — LOW (ref 32–36)
MCHC RBC-ENTMCNC: 30.8 PG — SIGNIFICANT CHANGE UP (ref 27–34)
MCHC RBC-ENTMCNC: 30.9 PG — SIGNIFICANT CHANGE UP (ref 27–34)
MCHC RBC-ENTMCNC: 30.9 PG — SIGNIFICANT CHANGE UP (ref 27–34)
MCHC RBC-ENTMCNC: 31 GM/DL — LOW (ref 32–36)
MCHC RBC-ENTMCNC: 31 PG — SIGNIFICANT CHANGE UP (ref 27–34)
MCHC RBC-ENTMCNC: 31 PG — SIGNIFICANT CHANGE UP (ref 27–34)
MCHC RBC-ENTMCNC: 31.1 GM/DL — LOW (ref 32–36)
MCHC RBC-ENTMCNC: 31.1 PG — SIGNIFICANT CHANGE UP (ref 27–34)
MCHC RBC-ENTMCNC: 31.1 PG — SIGNIFICANT CHANGE UP (ref 27–34)
MCHC RBC-ENTMCNC: 31.3 GM/DL — LOW (ref 32–36)
MCHC RBC-ENTMCNC: 31.3 PG — SIGNIFICANT CHANGE UP (ref 27–34)
MCHC RBC-ENTMCNC: 31.3 PG — SIGNIFICANT CHANGE UP (ref 27–34)
MCHC RBC-ENTMCNC: 31.4 GM/DL — LOW (ref 32–36)
MCHC RBC-ENTMCNC: 31.4 GM/DL — LOW (ref 32–36)
MCHC RBC-ENTMCNC: 31.4 PG — SIGNIFICANT CHANGE UP (ref 27–34)
MCHC RBC-ENTMCNC: 31.5 GM/DL — LOW (ref 32–36)
MCHC RBC-ENTMCNC: 31.5 PG — SIGNIFICANT CHANGE UP (ref 27–34)
MCHC RBC-ENTMCNC: 31.5 PG — SIGNIFICANT CHANGE UP (ref 27–34)
MCHC RBC-ENTMCNC: 31.6 GM/DL — LOW (ref 32–36)
MCHC RBC-ENTMCNC: 31.6 GM/DL — LOW (ref 32–36)
MCHC RBC-ENTMCNC: 31.6 PG — SIGNIFICANT CHANGE UP (ref 27–34)
MCHC RBC-ENTMCNC: 31.7 PG — SIGNIFICANT CHANGE UP (ref 27–34)
MCHC RBC-ENTMCNC: 31.8 GM/DL — LOW (ref 32–36)
MCHC RBC-ENTMCNC: 32 GM/DL — SIGNIFICANT CHANGE UP (ref 32–36)
MCHC RBC-ENTMCNC: 32 PG — SIGNIFICANT CHANGE UP (ref 27–34)
MCHC RBC-ENTMCNC: 32 PG — SIGNIFICANT CHANGE UP (ref 27–34)
MCHC RBC-ENTMCNC: 32.1 GM/DL — SIGNIFICANT CHANGE UP (ref 32–36)
MCHC RBC-ENTMCNC: 32.1 GM/DL — SIGNIFICANT CHANGE UP (ref 32–36)
MCHC RBC-ENTMCNC: 32.1 PG — SIGNIFICANT CHANGE UP (ref 27–34)
MCHC RBC-ENTMCNC: 32.2 GM/DL — SIGNIFICANT CHANGE UP (ref 32–36)
MCHC RBC-ENTMCNC: 32.2 PG — SIGNIFICANT CHANGE UP (ref 27–34)
MCHC RBC-ENTMCNC: 32.2 PG — SIGNIFICANT CHANGE UP (ref 27–34)
MCHC RBC-ENTMCNC: 32.3 PG — SIGNIFICANT CHANGE UP (ref 27–34)
MCHC RBC-ENTMCNC: 32.3 PG — SIGNIFICANT CHANGE UP (ref 27–34)
MCHC RBC-ENTMCNC: 32.5 GM/DL — SIGNIFICANT CHANGE UP (ref 32–36)
MCHC RBC-ENTMCNC: 32.7 GM/DL — SIGNIFICANT CHANGE UP (ref 32–36)
MCHC RBC-ENTMCNC: 34.8 GM/DL — SIGNIFICANT CHANGE UP (ref 32–36)
MCHC RBC-ENTMCNC: 34.8 GM/DL — SIGNIFICANT CHANGE UP (ref 32–36)
MCHC RBC-ENTMCNC: 36.5 PG — HIGH (ref 27–34)
MCHC RBC-ENTMCNC: 36.5 PG — HIGH (ref 27–34)
MCV RBC AUTO: 100 FL — SIGNIFICANT CHANGE UP (ref 80–100)
MCV RBC AUTO: 100.3 FL — HIGH (ref 80–100)
MCV RBC AUTO: 100.9 FL — HIGH (ref 80–100)
MCV RBC AUTO: 101 FL — HIGH (ref 80–100)
MCV RBC AUTO: 101 FL — HIGH (ref 80–100)
MCV RBC AUTO: 101.2 FL — HIGH (ref 80–100)
MCV RBC AUTO: 101.3 FL — HIGH (ref 80–100)
MCV RBC AUTO: 101.9 FL — HIGH (ref 80–100)
MCV RBC AUTO: 102.1 FL — HIGH (ref 80–100)
MCV RBC AUTO: 102.8 FL — HIGH (ref 80–100)
MCV RBC AUTO: 103.1 FL — HIGH (ref 80–100)
MCV RBC AUTO: 103.3 FL — HIGH (ref 80–100)
MCV RBC AUTO: 103.8 FL — HIGH (ref 80–100)
MCV RBC AUTO: 103.8 FL — HIGH (ref 80–100)
MCV RBC AUTO: 104.7 FL — HIGH (ref 80–100)
MCV RBC AUTO: 104.7 FL — HIGH (ref 80–100)
MCV RBC AUTO: 104.9 FL — HIGH (ref 80–100)
MCV RBC AUTO: 104.9 FL — HIGH (ref 80–100)
MCV RBC AUTO: 106.1 FL — HIGH (ref 80–100)
MCV RBC AUTO: 106.1 FL — HIGH (ref 80–100)
MCV RBC AUTO: 95 FL — SIGNIFICANT CHANGE UP (ref 80–100)
MCV RBC AUTO: 96.7 FL — SIGNIFICANT CHANGE UP (ref 80–100)
MCV RBC AUTO: 96.9 FL — SIGNIFICANT CHANGE UP (ref 80–100)
MCV RBC AUTO: 97.2 FL — SIGNIFICANT CHANGE UP (ref 80–100)
MCV RBC AUTO: 97.5 FL — SIGNIFICANT CHANGE UP (ref 80–100)
MCV RBC AUTO: 97.9 FL — SIGNIFICANT CHANGE UP (ref 80–100)
MCV RBC AUTO: 98 FL — SIGNIFICANT CHANGE UP (ref 80–100)
MCV RBC AUTO: 98.7 FL — SIGNIFICANT CHANGE UP (ref 80–100)
MCV RBC AUTO: 99.1 FL — SIGNIFICANT CHANGE UP (ref 80–100)
MCV RBC AUTO: 99.3 FL — SIGNIFICANT CHANGE UP (ref 80–100)
MCV RBC AUTO: 99.7 FL — SIGNIFICANT CHANGE UP (ref 80–100)
MESOTHL CELL # FLD: 6 % — SIGNIFICANT CHANGE UP
MESOTHL CELL # FLD: 8 % — SIGNIFICANT CHANGE UP
MONOCYTES # BLD AUTO: 1.01 K/UL — HIGH (ref 0–0.9)
MONOCYTES # BLD AUTO: 1.01 K/UL — HIGH (ref 0–0.9)
MONOCYTES # BLD AUTO: 1.02 K/UL — HIGH (ref 0–0.9)
MONOCYTES # BLD AUTO: 1.07 K/UL — HIGH (ref 0–0.9)
MONOCYTES # BLD AUTO: 1.07 K/UL — HIGH (ref 0–0.9)
MONOCYTES # BLD AUTO: 1.14 K/UL — HIGH (ref 0–0.9)
MONOCYTES # BLD AUTO: 1.14 K/UL — HIGH (ref 0–0.9)
MONOCYTES # BLD AUTO: 1.26 K/UL — HIGH (ref 0–0.9)
MONOCYTES # BLD AUTO: 1.32 K/UL — HIGH (ref 0–0.9)
MONOCYTES # BLD AUTO: 1.33 K/UL — HIGH (ref 0–0.9)
MONOCYTES # BLD AUTO: 1.34 K/UL — HIGH (ref 0–0.9)
MONOCYTES # BLD AUTO: 1.41 K/UL — HIGH (ref 0–0.9)
MONOCYTES # BLD AUTO: 1.52 K/UL — HIGH (ref 0–0.9)
MONOCYTES # BLD AUTO: 1.64 K/UL — HIGH (ref 0–0.9)
MONOCYTES # BLD AUTO: 1.65 K/UL — HIGH (ref 0–0.9)
MONOCYTES NFR BLD AUTO: 12.6 % — SIGNIFICANT CHANGE UP (ref 2–14)
MONOCYTES NFR BLD AUTO: 12.6 % — SIGNIFICANT CHANGE UP (ref 2–14)
MONOCYTES NFR BLD AUTO: 12.7 % — SIGNIFICANT CHANGE UP (ref 2–14)
MONOCYTES NFR BLD AUTO: 13 % — SIGNIFICANT CHANGE UP (ref 2–14)
MONOCYTES NFR BLD AUTO: 13.8 % — SIGNIFICANT CHANGE UP (ref 2–14)
MONOCYTES NFR BLD AUTO: 13.9 % — SIGNIFICANT CHANGE UP (ref 2–14)
MONOCYTES NFR BLD AUTO: 14.8 % — HIGH (ref 2–14)
MONOCYTES NFR BLD AUTO: 14.8 % — HIGH (ref 2–14)
MONOCYTES NFR BLD AUTO: 14.9 % — HIGH (ref 2–14)
MONOCYTES NFR BLD AUTO: 16.2 % — HIGH (ref 2–14)
MONOCYTES NFR BLD AUTO: 16.4 % — HIGH (ref 2–14)
MONOCYTES NFR BLD AUTO: 19.1 % — HIGH (ref 2–14)
MONOCYTES NFR BLD AUTO: 7.8 % — SIGNIFICANT CHANGE UP (ref 2–14)
MONOCYTES NFR BLD AUTO: 7.9 % — SIGNIFICANT CHANGE UP (ref 2–14)
MONOCYTES NFR BLD AUTO: 8.9 % — SIGNIFICANT CHANGE UP (ref 2–14)
MONOS+MACROS # FLD: 29 % — SIGNIFICANT CHANGE UP
MONOS+MACROS # FLD: 60 % — SIGNIFICANT CHANGE UP
NEUTROPHILS # BLD AUTO: 14.17 K/UL — HIGH (ref 1.8–7.4)
NEUTROPHILS # BLD AUTO: 14.46 K/UL — HIGH (ref 1.8–7.4)
NEUTROPHILS # BLD AUTO: 15.28 K/UL — HIGH (ref 1.8–7.4)
NEUTROPHILS # BLD AUTO: 4.28 K/UL — SIGNIFICANT CHANGE UP (ref 1.8–7.4)
NEUTROPHILS # BLD AUTO: 4.28 K/UL — SIGNIFICANT CHANGE UP (ref 1.8–7.4)
NEUTROPHILS # BLD AUTO: 4.63 K/UL — SIGNIFICANT CHANGE UP (ref 1.8–7.4)
NEUTROPHILS # BLD AUTO: 5.08 K/UL — SIGNIFICANT CHANGE UP (ref 1.8–7.4)
NEUTROPHILS # BLD AUTO: 5.31 K/UL — SIGNIFICANT CHANGE UP (ref 1.8–7.4)
NEUTROPHILS # BLD AUTO: 5.31 K/UL — SIGNIFICANT CHANGE UP (ref 1.8–7.4)
NEUTROPHILS # BLD AUTO: 5.54 K/UL — SIGNIFICANT CHANGE UP (ref 1.8–7.4)
NEUTROPHILS # BLD AUTO: 5.69 K/UL — SIGNIFICANT CHANGE UP (ref 1.8–7.4)
NEUTROPHILS # BLD AUTO: 5.77 K/UL — SIGNIFICANT CHANGE UP (ref 1.8–7.4)
NEUTROPHILS # BLD AUTO: 5.89 K/UL — SIGNIFICANT CHANGE UP (ref 1.8–7.4)
NEUTROPHILS # BLD AUTO: 6.21 K/UL — SIGNIFICANT CHANGE UP (ref 1.8–7.4)
NEUTROPHILS # BLD AUTO: 9 K/UL — HIGH (ref 1.8–7.4)
NEUTROPHILS NFR BLD AUTO: 59.4 % — SIGNIFICANT CHANGE UP (ref 43–77)
NEUTROPHILS NFR BLD AUTO: 59.4 % — SIGNIFICANT CHANGE UP (ref 43–77)
NEUTROPHILS NFR BLD AUTO: 62.3 % — SIGNIFICANT CHANGE UP (ref 43–77)
NEUTROPHILS NFR BLD AUTO: 63.1 % — SIGNIFICANT CHANGE UP (ref 43–77)
NEUTROPHILS NFR BLD AUTO: 64.1 % — SIGNIFICANT CHANGE UP (ref 43–77)
NEUTROPHILS NFR BLD AUTO: 64.7 % — SIGNIFICANT CHANGE UP (ref 43–77)
NEUTROPHILS NFR BLD AUTO: 66.1 % — SIGNIFICANT CHANGE UP (ref 43–77)
NEUTROPHILS NFR BLD AUTO: 66.1 % — SIGNIFICANT CHANGE UP (ref 43–77)
NEUTROPHILS NFR BLD AUTO: 66.3 % — SIGNIFICANT CHANGE UP (ref 43–77)
NEUTROPHILS NFR BLD AUTO: 66.8 % — SIGNIFICANT CHANGE UP (ref 43–77)
NEUTROPHILS NFR BLD AUTO: 76.3 % — SIGNIFICANT CHANGE UP (ref 43–77)
NEUTROPHILS NFR BLD AUTO: 77.2 % — HIGH (ref 43–77)
NEUTROPHILS NFR BLD AUTO: 82.4 % — HIGH (ref 43–77)
NEUTROPHILS NFR BLD AUTO: 84.5 % — HIGH (ref 43–77)
NEUTROPHILS NFR BLD AUTO: 84.6 % — HIGH (ref 43–77)
NEUTROPHILS-BODY FLUID: 10 % — SIGNIFICANT CHANGE UP
NEUTROPHILS-BODY FLUID: 5 % — SIGNIFICANT CHANGE UP
NEUTS BAND # BLD: 0.9 % — SIGNIFICANT CHANGE UP (ref 0–6)
NITRITE UR-MCNC: NEGATIVE — SIGNIFICANT CHANGE UP
NITRITE UR-MCNC: NEGATIVE — SIGNIFICANT CHANGE UP
NON HDL CHOLESTEROL: 40 MG/DL — SIGNIFICANT CHANGE UP
NON HDL CHOLESTEROL: 40 MG/DL — SIGNIFICANT CHANGE UP
NON HDL CHOLESTEROL: 55 MG/DL — SIGNIFICANT CHANGE UP
NON-GYNECOLOGICAL CYTOLOGY STUDY: SIGNIFICANT CHANGE UP
NON-GYNECOLOGICAL CYTOLOGY STUDY: SIGNIFICANT CHANGE UP
NRBC # BLD: 0 /100 WBCS — SIGNIFICANT CHANGE UP (ref 0–0)
NRBC # FLD: 0 K/UL — SIGNIFICANT CHANGE UP (ref 0–0)
NRBC # FLD: 0.02 K/UL — HIGH (ref 0–0)
NRBC # FLD: 0.02 K/UL — HIGH (ref 0–0)
NRBC # FLD: 0.05 K/UL — HIGH (ref 0–0)
NT-PROBNP SERPL-SCNC: 2829 PG/ML — HIGH (ref 0–300)
NT-PROBNP SERPL-SCNC: 2829 PG/ML — HIGH (ref 0–300)
NT-PROBNP SERPL-SCNC: 3572 PG/ML — HIGH (ref 0–300)
PCO2 BLDV: 61 MMHG — HIGH (ref 42–55)
PCO2 BLDV: 72 MMHG — HIGH (ref 42–55)
PCO2 BLDV: 96 MMHG — HIGH (ref 42–55)
PH BLDV: 7.19 — LOW (ref 7.32–7.43)
PH BLDV: 7.31 — LOW (ref 7.32–7.43)
PH BLDV: 7.38 — SIGNIFICANT CHANGE UP (ref 7.32–7.43)
PH FLD: 7.4 — SIGNIFICANT CHANGE UP
PH FLD: 7.8 — SIGNIFICANT CHANGE UP
PH UR: 6 — SIGNIFICANT CHANGE UP (ref 5–8)
PH UR: 6 — SIGNIFICANT CHANGE UP (ref 5–8)
PHOSPHATE SERPL-MCNC: 3.8 MG/DL — SIGNIFICANT CHANGE UP (ref 2.5–4.5)
PHOSPHATE SERPL-MCNC: 3.8 MG/DL — SIGNIFICANT CHANGE UP (ref 2.5–4.5)
PHOSPHATE SERPL-MCNC: 3.9 MG/DL — SIGNIFICANT CHANGE UP (ref 2.5–4.5)
PHOSPHATE SERPL-MCNC: 3.9 MG/DL — SIGNIFICANT CHANGE UP (ref 2.5–4.5)
PHOSPHATE SERPL-MCNC: 4.1 MG/DL — SIGNIFICANT CHANGE UP (ref 2.5–4.5)
PHOSPHATE SERPL-MCNC: 4.2 MG/DL — SIGNIFICANT CHANGE UP (ref 2.5–4.5)
PHOSPHATE SERPL-MCNC: 4.3 MG/DL — SIGNIFICANT CHANGE UP (ref 2.5–4.5)
PHOSPHATE SERPL-MCNC: 4.5 MG/DL — SIGNIFICANT CHANGE UP (ref 2.5–4.5)
PHOSPHATE SERPL-MCNC: 4.5 MG/DL — SIGNIFICANT CHANGE UP (ref 2.5–4.5)
PHOSPHATE SERPL-MCNC: 4.7 MG/DL — HIGH (ref 2.5–4.5)
PHOSPHATE SERPL-MCNC: 4.8 MG/DL — HIGH (ref 2.5–4.5)
PHOSPHATE SERPL-MCNC: 4.8 MG/DL — HIGH (ref 2.5–4.5)
PHOSPHATE SERPL-MCNC: 5.1 MG/DL — HIGH (ref 2.5–4.5)
PHOSPHATE SERPL-MCNC: 5.3 MG/DL — HIGH (ref 2.5–4.5)
PHOSPHATE SERPL-MCNC: 5.4 MG/DL — HIGH (ref 2.5–4.5)
PHOSPHATE SERPL-MCNC: 5.4 MG/DL — HIGH (ref 2.5–4.5)
PHOSPHATE SERPL-MCNC: 5.5 MG/DL — HIGH (ref 2.5–4.5)
PHOSPHATE SERPL-MCNC: 5.6 MG/DL — HIGH (ref 2.5–4.5)
PHOSPHATE SERPL-MCNC: 5.6 MG/DL — HIGH (ref 2.5–4.5)
PHOSPHATE SERPL-MCNC: 5.8 MG/DL — HIGH (ref 2.5–4.5)
PHOSPHATE SERPL-MCNC: 6.1 MG/DL — HIGH (ref 2.5–4.5)
PHOSPHATE SERPL-MCNC: 6.2 MG/DL — HIGH (ref 2.5–4.5)
PHOSPHATE SERPL-MCNC: 8.4 MG/DL — HIGH (ref 2.5–4.5)
PLAT MORPH BLD: NORMAL — SIGNIFICANT CHANGE UP
PLATELET # BLD AUTO: 156 K/UL — SIGNIFICANT CHANGE UP (ref 150–400)
PLATELET # BLD AUTO: 160 K/UL — SIGNIFICANT CHANGE UP (ref 150–400)
PLATELET # BLD AUTO: 164 K/UL — SIGNIFICANT CHANGE UP (ref 150–400)
PLATELET # BLD AUTO: 166 K/UL — SIGNIFICANT CHANGE UP (ref 150–400)
PLATELET # BLD AUTO: 170 K/UL — SIGNIFICANT CHANGE UP (ref 150–400)
PLATELET # BLD AUTO: 171 K/UL — SIGNIFICANT CHANGE UP (ref 150–400)
PLATELET # BLD AUTO: 171 K/UL — SIGNIFICANT CHANGE UP (ref 150–400)
PLATELET # BLD AUTO: 174 K/UL — SIGNIFICANT CHANGE UP (ref 150–400)
PLATELET # BLD AUTO: 175 K/UL — SIGNIFICANT CHANGE UP (ref 150–400)
PLATELET # BLD AUTO: 178 K/UL — SIGNIFICANT CHANGE UP (ref 150–400)
PLATELET # BLD AUTO: 180 K/UL — SIGNIFICANT CHANGE UP (ref 150–400)
PLATELET # BLD AUTO: 180 K/UL — SIGNIFICANT CHANGE UP (ref 150–400)
PLATELET # BLD AUTO: 186 K/UL — SIGNIFICANT CHANGE UP (ref 150–400)
PLATELET # BLD AUTO: 188 K/UL — SIGNIFICANT CHANGE UP (ref 150–400)
PLATELET # BLD AUTO: 194 K/UL — SIGNIFICANT CHANGE UP (ref 150–400)
PLATELET # BLD AUTO: 195 K/UL — SIGNIFICANT CHANGE UP (ref 150–400)
PLATELET # BLD AUTO: 195 K/UL — SIGNIFICANT CHANGE UP (ref 150–400)
PLATELET # BLD AUTO: 199 K/UL — SIGNIFICANT CHANGE UP (ref 150–400)
PLATELET # BLD AUTO: 200 K/UL — SIGNIFICANT CHANGE UP (ref 150–400)
PLATELET # BLD AUTO: 201 K/UL — SIGNIFICANT CHANGE UP (ref 150–400)
PLATELET # BLD AUTO: 201 K/UL — SIGNIFICANT CHANGE UP (ref 150–400)
PLATELET # BLD AUTO: 204 K/UL — SIGNIFICANT CHANGE UP (ref 150–400)
PLATELET # BLD AUTO: 207 K/UL — SIGNIFICANT CHANGE UP (ref 150–400)
PLATELET # BLD AUTO: 207 K/UL — SIGNIFICANT CHANGE UP (ref 150–400)
PLATELET # BLD AUTO: 209 K/UL — SIGNIFICANT CHANGE UP (ref 150–400)
PLATELET # BLD AUTO: 217 K/UL — SIGNIFICANT CHANGE UP (ref 150–400)
PLATELET # BLD AUTO: 217 K/UL — SIGNIFICANT CHANGE UP (ref 150–400)
PLATELET # BLD AUTO: 218 K/UL — SIGNIFICANT CHANGE UP (ref 150–400)
PLATELET # BLD AUTO: 227 K/UL — SIGNIFICANT CHANGE UP (ref 150–400)
PLATELET # BLD AUTO: 228 K/UL — SIGNIFICANT CHANGE UP (ref 150–400)
PLATELET # BLD AUTO: 230 K/UL — SIGNIFICANT CHANGE UP (ref 150–400)
PLATELET # BLD AUTO: 236 K/UL — SIGNIFICANT CHANGE UP (ref 150–400)
PLATELET # BLD AUTO: 241 K/UL — SIGNIFICANT CHANGE UP (ref 150–400)
PLATELET COUNT - ESTIMATE: NORMAL — SIGNIFICANT CHANGE UP
PO2 BLDV: 109 MMHG — HIGH (ref 25–45)
PO2 BLDV: 42 MMHG — SIGNIFICANT CHANGE UP (ref 25–45)
PO2 BLDV: 45 MMHG — SIGNIFICANT CHANGE UP (ref 25–45)
POIKILOCYTOSIS BLD QL AUTO: SIGNIFICANT CHANGE UP
POTASSIUM BLDV-SCNC: 4.5 MMOL/L — SIGNIFICANT CHANGE UP (ref 3.5–5.1)
POTASSIUM BLDV-SCNC: 5.3 MMOL/L — HIGH (ref 3.5–5.1)
POTASSIUM SERPL-MCNC: 2.9 MMOL/L — CRITICAL LOW (ref 3.5–5.3)
POTASSIUM SERPL-MCNC: 3.4 MMOL/L — LOW (ref 3.5–5.3)
POTASSIUM SERPL-MCNC: 3.5 MMOL/L — SIGNIFICANT CHANGE UP (ref 3.5–5.3)
POTASSIUM SERPL-MCNC: 3.6 MMOL/L — SIGNIFICANT CHANGE UP (ref 3.5–5.3)
POTASSIUM SERPL-MCNC: 3.6 MMOL/L — SIGNIFICANT CHANGE UP (ref 3.5–5.3)
POTASSIUM SERPL-MCNC: 3.7 MMOL/L — SIGNIFICANT CHANGE UP (ref 3.5–5.3)
POTASSIUM SERPL-MCNC: 3.8 MMOL/L — SIGNIFICANT CHANGE UP (ref 3.5–5.3)
POTASSIUM SERPL-MCNC: 4 MMOL/L — SIGNIFICANT CHANGE UP (ref 3.5–5.3)
POTASSIUM SERPL-MCNC: 4.1 MMOL/L — SIGNIFICANT CHANGE UP (ref 3.5–5.3)
POTASSIUM SERPL-MCNC: 4.2 MMOL/L — SIGNIFICANT CHANGE UP (ref 3.5–5.3)
POTASSIUM SERPL-MCNC: 4.3 MMOL/L — SIGNIFICANT CHANGE UP (ref 3.5–5.3)
POTASSIUM SERPL-MCNC: 4.4 MMOL/L — SIGNIFICANT CHANGE UP (ref 3.5–5.3)
POTASSIUM SERPL-MCNC: 4.5 MMOL/L — SIGNIFICANT CHANGE UP (ref 3.5–5.3)
POTASSIUM SERPL-MCNC: 4.5 MMOL/L — SIGNIFICANT CHANGE UP (ref 3.5–5.3)
POTASSIUM SERPL-MCNC: 4.7 MMOL/L — SIGNIFICANT CHANGE UP (ref 3.5–5.3)
POTASSIUM SERPL-MCNC: 4.8 MMOL/L — SIGNIFICANT CHANGE UP (ref 3.5–5.3)
POTASSIUM SERPL-MCNC: 4.9 MMOL/L — SIGNIFICANT CHANGE UP (ref 3.5–5.3)
POTASSIUM SERPL-MCNC: 5.3 MMOL/L — SIGNIFICANT CHANGE UP (ref 3.5–5.3)
POTASSIUM SERPL-MCNC: 5.5 MMOL/L — HIGH (ref 3.5–5.3)
POTASSIUM SERPL-SCNC: 2.9 MMOL/L — CRITICAL LOW (ref 3.5–5.3)
POTASSIUM SERPL-SCNC: 3.4 MMOL/L — LOW (ref 3.5–5.3)
POTASSIUM SERPL-SCNC: 3.5 MMOL/L — SIGNIFICANT CHANGE UP (ref 3.5–5.3)
POTASSIUM SERPL-SCNC: 3.6 MMOL/L — SIGNIFICANT CHANGE UP (ref 3.5–5.3)
POTASSIUM SERPL-SCNC: 3.6 MMOL/L — SIGNIFICANT CHANGE UP (ref 3.5–5.3)
POTASSIUM SERPL-SCNC: 3.7 MMOL/L — SIGNIFICANT CHANGE UP (ref 3.5–5.3)
POTASSIUM SERPL-SCNC: 3.8 MMOL/L — SIGNIFICANT CHANGE UP (ref 3.5–5.3)
POTASSIUM SERPL-SCNC: 4 MMOL/L — SIGNIFICANT CHANGE UP (ref 3.5–5.3)
POTASSIUM SERPL-SCNC: 4.1 MMOL/L — SIGNIFICANT CHANGE UP (ref 3.5–5.3)
POTASSIUM SERPL-SCNC: 4.2 MMOL/L — SIGNIFICANT CHANGE UP (ref 3.5–5.3)
POTASSIUM SERPL-SCNC: 4.3 MMOL/L — SIGNIFICANT CHANGE UP (ref 3.5–5.3)
POTASSIUM SERPL-SCNC: 4.4 MMOL/L — SIGNIFICANT CHANGE UP (ref 3.5–5.3)
POTASSIUM SERPL-SCNC: 4.5 MMOL/L — SIGNIFICANT CHANGE UP (ref 3.5–5.3)
POTASSIUM SERPL-SCNC: 4.5 MMOL/L — SIGNIFICANT CHANGE UP (ref 3.5–5.3)
POTASSIUM SERPL-SCNC: 4.7 MMOL/L — SIGNIFICANT CHANGE UP (ref 3.5–5.3)
POTASSIUM SERPL-SCNC: 4.8 MMOL/L — SIGNIFICANT CHANGE UP (ref 3.5–5.3)
POTASSIUM SERPL-SCNC: 4.9 MMOL/L — SIGNIFICANT CHANGE UP (ref 3.5–5.3)
POTASSIUM SERPL-SCNC: 5.3 MMOL/L — SIGNIFICANT CHANGE UP (ref 3.5–5.3)
POTASSIUM SERPL-SCNC: 5.5 MMOL/L — HIGH (ref 3.5–5.3)
PROT FLD-MCNC: 3.8 G/DL — SIGNIFICANT CHANGE UP
PROT FLD-MCNC: 5 G/DL — SIGNIFICANT CHANGE UP
PROT SERPL-MCNC: 7 G/DL — SIGNIFICANT CHANGE UP (ref 6–8.3)
PROT SERPL-MCNC: 7 G/DL — SIGNIFICANT CHANGE UP (ref 6–8.3)
PROT SERPL-MCNC: 7.3 G/DL — SIGNIFICANT CHANGE UP (ref 6–8.3)
PROT SERPL-MCNC: 7.5 G/DL — SIGNIFICANT CHANGE UP (ref 6–8.3)
PROT SERPL-MCNC: 7.6 G/DL — SIGNIFICANT CHANGE UP (ref 6–8.3)
PROT SERPL-MCNC: 7.6 G/DL — SIGNIFICANT CHANGE UP (ref 6–8.3)
PROT SERPL-MCNC: 7.7 G/DL — SIGNIFICANT CHANGE UP (ref 6–8.3)
PROT SERPL-MCNC: 7.7 G/DL — SIGNIFICANT CHANGE UP (ref 6–8.3)
PROT SERPL-MCNC: 8.2 G/DL — SIGNIFICANT CHANGE UP (ref 6–8.3)
PROT UR-MCNC: NEGATIVE MG/DL — SIGNIFICANT CHANGE UP
PROT UR-MCNC: NEGATIVE MG/DL — SIGNIFICANT CHANGE UP
PROTHROM AB SERPL-ACNC: 12.2 SEC — SIGNIFICANT CHANGE UP (ref 9.5–13)
PROTHROM AB SERPL-ACNC: 12.9 SEC — SIGNIFICANT CHANGE UP (ref 9.5–13)
PROTHROM AB SERPL-ACNC: 12.9 SEC — SIGNIFICANT CHANGE UP (ref 9.5–13)
PROTHROM AB SERPL-ACNC: 13.1 SEC — HIGH (ref 9.5–13)
PROTHROM AB SERPL-ACNC: 13.1 SEC — HIGH (ref 9.5–13)
PROTHROM AB SERPL-ACNC: 17.7 SEC — HIGH (ref 9.5–13)
RAPID RVP RESULT: DETECTED
RAPID RVP RESULT: SIGNIFICANT CHANGE UP
RBC # BLD: 2.74 M/UL — LOW (ref 4.2–5.8)
RBC # BLD: 2.78 M/UL — LOW (ref 4.2–5.8)
RBC # BLD: 2.78 M/UL — LOW (ref 4.2–5.8)
RBC # BLD: 2.88 M/UL — LOW (ref 4.2–5.8)
RBC # BLD: 2.88 M/UL — LOW (ref 4.2–5.8)
RBC # BLD: 2.89 M/UL — LOW (ref 4.2–5.8)
RBC # BLD: 2.89 M/UL — LOW (ref 4.2–5.8)
RBC # BLD: 2.9 M/UL — LOW (ref 4.2–5.8)
RBC # BLD: 2.9 M/UL — LOW (ref 4.2–5.8)
RBC # BLD: 2.97 M/UL — LOW (ref 4.2–5.8)
RBC # BLD: 2.97 M/UL — LOW (ref 4.2–5.8)
RBC # BLD: 3 M/UL — LOW (ref 4.2–5.8)
RBC # BLD: 3.03 M/UL — LOW (ref 4.2–5.8)
RBC # BLD: 3.13 M/UL — LOW (ref 4.2–5.8)
RBC # BLD: 3.18 M/UL — LOW (ref 4.2–5.8)
RBC # BLD: 3.19 M/UL — LOW (ref 4.2–5.8)
RBC # BLD: 3.2 M/UL — LOW (ref 4.2–5.8)
RBC # BLD: 3.23 M/UL — LOW (ref 4.2–5.8)
RBC # BLD: 3.23 M/UL — LOW (ref 4.2–5.8)
RBC # BLD: 3.24 M/UL — LOW (ref 4.2–5.8)
RBC # BLD: 3.24 M/UL — LOW (ref 4.2–5.8)
RBC # BLD: 3.25 M/UL — LOW (ref 4.2–5.8)
RBC # BLD: 3.28 M/UL — LOW (ref 4.2–5.8)
RBC # BLD: 3.29 M/UL — LOW (ref 4.2–5.8)
RBC # BLD: 3.29 M/UL — LOW (ref 4.2–5.8)
RBC # BLD: 3.36 M/UL — LOW (ref 4.2–5.8)
RBC # BLD: 3.37 M/UL — LOW (ref 4.2–5.8)
RBC # BLD: 3.41 M/UL — LOW (ref 4.2–5.8)
RBC # BLD: 3.41 M/UL — LOW (ref 4.2–5.8)
RBC # BLD: 3.5 M/UL — LOW (ref 4.2–5.8)
RBC # BLD: 3.56 M/UL — LOW (ref 4.2–5.8)
RBC # BLD: 3.59 M/UL — LOW (ref 4.2–5.8)
RBC # BLD: 3.68 M/UL — LOW (ref 4.2–5.8)
RBC # BLD: 3.7 M/UL — LOW (ref 4.2–5.8)
RBC # FLD: 14.9 % — HIGH (ref 10.3–14.5)
RBC # FLD: 15 % — HIGH (ref 10.3–14.5)
RBC # FLD: 15.1 % — HIGH (ref 10.3–14.5)
RBC # FLD: 15.2 % — HIGH (ref 10.3–14.5)
RBC # FLD: 15.3 % — HIGH (ref 10.3–14.5)
RBC # FLD: 15.4 % — HIGH (ref 10.3–14.5)
RBC # FLD: 15.5 % — HIGH (ref 10.3–14.5)
RBC # FLD: 15.6 % — HIGH (ref 10.3–14.5)
RBC # FLD: 15.6 % — HIGH (ref 10.3–14.5)
RBC # FLD: 15.7 % — HIGH (ref 10.3–14.5)
RBC # FLD: 15.8 % — HIGH (ref 10.3–14.5)
RBC # FLD: 15.8 % — HIGH (ref 10.3–14.5)
RBC BLD AUTO: ABNORMAL
RBC CASTS # UR COMP ASSIST: 0 /HPF — SIGNIFICANT CHANGE UP (ref 0–4)
RBC CASTS # UR COMP ASSIST: 0 /HPF — SIGNIFICANT CHANGE UP (ref 0–4)
RCV VOL RI: HIGH /UL (ref 0–0)
RCV VOL RI: HIGH CELLS/UL (ref 0–5)
RETICS #: 69 K/UL — SIGNIFICANT CHANGE UP (ref 25–125)
RETICS #: 69 K/UL — SIGNIFICANT CHANGE UP (ref 25–125)
RETICS/RBC NFR: 2.5 % — SIGNIFICANT CHANGE UP (ref 0.5–2.5)
RETICS/RBC NFR: 2.5 % — SIGNIFICANT CHANGE UP (ref 0.5–2.5)
RSV RNA SPEC QL NAA+PROBE: SIGNIFICANT CHANGE UP
RV+EV RNA SPEC QL NAA+PROBE: SIGNIFICANT CHANGE UP
SAO2 % BLDV: 68.4 % — SIGNIFICANT CHANGE UP (ref 67–88)
SAO2 % BLDV: 73.5 % — SIGNIFICANT CHANGE UP (ref 67–88)
SAO2 % BLDV: 98 % — HIGH (ref 67–88)
SARS-COV-2 RNA SPEC QL NAA+PROBE: DETECTED
SARS-COV-2 RNA SPEC QL NAA+PROBE: SIGNIFICANT CHANGE UP
SCHISTOCYTES BLD QL AUTO: SLIGHT — SIGNIFICANT CHANGE UP
SODIUM SERPL-SCNC: 138 MMOL/L — SIGNIFICANT CHANGE UP (ref 135–145)
SODIUM SERPL-SCNC: 138 MMOL/L — SIGNIFICANT CHANGE UP (ref 135–145)
SODIUM SERPL-SCNC: 139 MMOL/L — SIGNIFICANT CHANGE UP (ref 135–145)
SODIUM SERPL-SCNC: 140 MMOL/L — SIGNIFICANT CHANGE UP (ref 135–145)
SODIUM SERPL-SCNC: 141 MMOL/L — SIGNIFICANT CHANGE UP (ref 135–145)
SODIUM SERPL-SCNC: 142 MMOL/L — SIGNIFICANT CHANGE UP (ref 135–145)
SODIUM SERPL-SCNC: 143 MMOL/L — SIGNIFICANT CHANGE UP (ref 135–145)
SODIUM SERPL-SCNC: 144 MMOL/L — SIGNIFICANT CHANGE UP (ref 135–145)
SODIUM SERPL-SCNC: 145 MMOL/L — SIGNIFICANT CHANGE UP (ref 135–145)
SODIUM SERPL-SCNC: 146 MMOL/L — HIGH (ref 135–145)
SODIUM SERPL-SCNC: 147 MMOL/L — HIGH (ref 135–145)
SODIUM SERPL-SCNC: 148 MMOL/L — HIGH (ref 135–145)
SODIUM SERPL-SCNC: 149 MMOL/L — HIGH (ref 135–145)
SP GR SPEC: 1.01 — SIGNIFICANT CHANGE UP (ref 1–1.03)
SP GR SPEC: 1.01 — SIGNIFICANT CHANGE UP (ref 1–1.03)
SPECIMEN SOURCE: SIGNIFICANT CHANGE UP
SQUAMOUS # UR AUTO: 0 /HPF — SIGNIFICANT CHANGE UP (ref 0–5)
SQUAMOUS # UR AUTO: 0 /HPF — SIGNIFICANT CHANGE UP (ref 0–5)
TIBC SERPL-MCNC: 275 UG/DL — SIGNIFICANT CHANGE UP (ref 220–430)
TIBC SERPL-MCNC: 275 UG/DL — SIGNIFICANT CHANGE UP (ref 220–430)
TOTAL CELLS COUNTED, BODY FLUID: 100 CELLS — SIGNIFICANT CHANGE UP
TOTAL NUCLEATED CELL COUNT, BODY FLUID: 546 CELLS/UL — HIGH (ref 0–5)
TOTAL NUCLEATED CELL COUNT, BODY FLUID: 841 /UL — SIGNIFICANT CHANGE UP
TRIGL SERPL-MCNC: 16 MG/DL — SIGNIFICANT CHANGE UP
TRIGL SERPL-MCNC: 16 MG/DL — SIGNIFICANT CHANGE UP
TRIGL SERPL-MCNC: 61 MG/DL — SIGNIFICANT CHANGE UP
TROPONIN I, HIGH SENSITIVITY RESULT: 48.3 NG/L — SIGNIFICANT CHANGE UP
TROPONIN I, HIGH SENSITIVITY RESULT: 54.3 NG/L — SIGNIFICANT CHANGE UP
TUBE TYPE: SIGNIFICANT CHANGE UP
TUBE TYPE: SIGNIFICANT CHANGE UP
UIBC SERPL-MCNC: 217 UG/DL — SIGNIFICANT CHANGE UP (ref 110–370)
UIBC SERPL-MCNC: 217 UG/DL — SIGNIFICANT CHANGE UP (ref 110–370)
UROBILINOGEN FLD QL: 0.2 MG/DL — SIGNIFICANT CHANGE UP (ref 0.2–1)
UROBILINOGEN FLD QL: 0.2 MG/DL — SIGNIFICANT CHANGE UP (ref 0.2–1)
VIT B12 SERPL-MCNC: 967 PG/ML — HIGH (ref 200–900)
VIT B12 SERPL-MCNC: 967 PG/ML — HIGH (ref 200–900)
WBC # BLD: 10.29 K/UL — SIGNIFICANT CHANGE UP (ref 3.8–10.5)
WBC # BLD: 11.67 K/UL — HIGH (ref 3.8–10.5)
WBC # BLD: 11.83 K/UL — HIGH (ref 3.8–10.5)
WBC # BLD: 13.36 K/UL — HIGH (ref 3.8–10.5)
WBC # BLD: 14.15 K/UL — HIGH (ref 3.8–10.5)
WBC # BLD: 15.97 K/UL — HIGH (ref 3.8–10.5)
WBC # BLD: 16.35 K/UL — HIGH (ref 3.8–10.5)
WBC # BLD: 17.12 K/UL — HIGH (ref 3.8–10.5)
WBC # BLD: 18.53 K/UL — HIGH (ref 3.8–10.5)
WBC # BLD: 19.87 K/UL — HIGH (ref 3.8–10.5)
WBC # BLD: 6.94 K/UL — SIGNIFICANT CHANGE UP (ref 3.8–10.5)
WBC # BLD: 7.21 K/UL — SIGNIFICANT CHANGE UP (ref 3.8–10.5)
WBC # BLD: 7.21 K/UL — SIGNIFICANT CHANGE UP (ref 3.8–10.5)
WBC # BLD: 7.3 K/UL — SIGNIFICANT CHANGE UP (ref 3.8–10.5)
WBC # BLD: 7.4 K/UL — SIGNIFICANT CHANGE UP (ref 3.8–10.5)
WBC # BLD: 7.4 K/UL — SIGNIFICANT CHANGE UP (ref 3.8–10.5)
WBC # BLD: 7.63 K/UL — SIGNIFICANT CHANGE UP (ref 3.8–10.5)
WBC # BLD: 8.03 K/UL — SIGNIFICANT CHANGE UP (ref 3.8–10.5)
WBC # BLD: 8.03 K/UL — SIGNIFICANT CHANGE UP (ref 3.8–10.5)
WBC # BLD: 8.05 K/UL — SIGNIFICANT CHANGE UP (ref 3.8–10.5)
WBC # BLD: 8.08 K/UL — SIGNIFICANT CHANGE UP (ref 3.8–10.5)
WBC # BLD: 8.15 K/UL — SIGNIFICANT CHANGE UP (ref 3.8–10.5)
WBC # BLD: 8.15 K/UL — SIGNIFICANT CHANGE UP (ref 3.8–10.5)
WBC # BLD: 8.18 K/UL — SIGNIFICANT CHANGE UP (ref 3.8–10.5)
WBC # BLD: 8.63 K/UL — SIGNIFICANT CHANGE UP (ref 3.8–10.5)
WBC # BLD: 8.69 K/UL — SIGNIFICANT CHANGE UP (ref 3.8–10.5)
WBC # BLD: 8.71 K/UL — SIGNIFICANT CHANGE UP (ref 3.8–10.5)
WBC # BLD: 8.72 K/UL — SIGNIFICANT CHANGE UP (ref 3.8–10.5)
WBC # BLD: 8.81 K/UL — SIGNIFICANT CHANGE UP (ref 3.8–10.5)
WBC # BLD: 8.81 K/UL — SIGNIFICANT CHANGE UP (ref 3.8–10.5)
WBC # BLD: 9.13 K/UL — SIGNIFICANT CHANGE UP (ref 3.8–10.5)
WBC # BLD: 9.13 K/UL — SIGNIFICANT CHANGE UP (ref 3.8–10.5)
WBC # BLD: 9.14 K/UL — SIGNIFICANT CHANGE UP (ref 3.8–10.5)
WBC # BLD: 9.59 K/UL — SIGNIFICANT CHANGE UP (ref 3.8–10.5)
WBC # BLD: 9.74 K/UL — SIGNIFICANT CHANGE UP (ref 3.8–10.5)
WBC # FLD AUTO: 10.29 K/UL — SIGNIFICANT CHANGE UP (ref 3.8–10.5)
WBC # FLD AUTO: 11.67 K/UL — HIGH (ref 3.8–10.5)
WBC # FLD AUTO: 11.83 K/UL — HIGH (ref 3.8–10.5)
WBC # FLD AUTO: 13.36 K/UL — HIGH (ref 3.8–10.5)
WBC # FLD AUTO: 14.15 K/UL — HIGH (ref 3.8–10.5)
WBC # FLD AUTO: 15.97 K/UL — HIGH (ref 3.8–10.5)
WBC # FLD AUTO: 16.35 K/UL — HIGH (ref 3.8–10.5)
WBC # FLD AUTO: 17.12 K/UL — HIGH (ref 3.8–10.5)
WBC # FLD AUTO: 18.53 K/UL — HIGH (ref 3.8–10.5)
WBC # FLD AUTO: 19.87 K/UL — HIGH (ref 3.8–10.5)
WBC # FLD AUTO: 6.94 K/UL — SIGNIFICANT CHANGE UP (ref 3.8–10.5)
WBC # FLD AUTO: 7.21 K/UL — SIGNIFICANT CHANGE UP (ref 3.8–10.5)
WBC # FLD AUTO: 7.21 K/UL — SIGNIFICANT CHANGE UP (ref 3.8–10.5)
WBC # FLD AUTO: 7.3 K/UL — SIGNIFICANT CHANGE UP (ref 3.8–10.5)
WBC # FLD AUTO: 7.4 K/UL — SIGNIFICANT CHANGE UP (ref 3.8–10.5)
WBC # FLD AUTO: 7.4 K/UL — SIGNIFICANT CHANGE UP (ref 3.8–10.5)
WBC # FLD AUTO: 7.63 K/UL — SIGNIFICANT CHANGE UP (ref 3.8–10.5)
WBC # FLD AUTO: 8.03 K/UL — SIGNIFICANT CHANGE UP (ref 3.8–10.5)
WBC # FLD AUTO: 8.03 K/UL — SIGNIFICANT CHANGE UP (ref 3.8–10.5)
WBC # FLD AUTO: 8.05 K/UL — SIGNIFICANT CHANGE UP (ref 3.8–10.5)
WBC # FLD AUTO: 8.08 K/UL — SIGNIFICANT CHANGE UP (ref 3.8–10.5)
WBC # FLD AUTO: 8.15 K/UL — SIGNIFICANT CHANGE UP (ref 3.8–10.5)
WBC # FLD AUTO: 8.15 K/UL — SIGNIFICANT CHANGE UP (ref 3.8–10.5)
WBC # FLD AUTO: 8.18 K/UL — SIGNIFICANT CHANGE UP (ref 3.8–10.5)
WBC # FLD AUTO: 8.63 K/UL — SIGNIFICANT CHANGE UP (ref 3.8–10.5)
WBC # FLD AUTO: 8.69 K/UL — SIGNIFICANT CHANGE UP (ref 3.8–10.5)
WBC # FLD AUTO: 8.71 K/UL — SIGNIFICANT CHANGE UP (ref 3.8–10.5)
WBC # FLD AUTO: 8.72 K/UL — SIGNIFICANT CHANGE UP (ref 3.8–10.5)
WBC # FLD AUTO: 8.81 K/UL — SIGNIFICANT CHANGE UP (ref 3.8–10.5)
WBC # FLD AUTO: 8.81 K/UL — SIGNIFICANT CHANGE UP (ref 3.8–10.5)
WBC # FLD AUTO: 9.13 K/UL — SIGNIFICANT CHANGE UP (ref 3.8–10.5)
WBC # FLD AUTO: 9.13 K/UL — SIGNIFICANT CHANGE UP (ref 3.8–10.5)
WBC # FLD AUTO: 9.14 K/UL — SIGNIFICANT CHANGE UP (ref 3.8–10.5)
WBC # FLD AUTO: 9.59 K/UL — SIGNIFICANT CHANGE UP (ref 3.8–10.5)
WBC # FLD AUTO: 9.74 K/UL — SIGNIFICANT CHANGE UP (ref 3.8–10.5)
WBC UR QL: 0 /HPF — SIGNIFICANT CHANGE UP (ref 0–5)
WBC UR QL: 0 /HPF — SIGNIFICANT CHANGE UP (ref 0–5)

## 2024-01-01 PROCEDURE — 85730 THROMBOPLASTIN TIME PARTIAL: CPT

## 2024-01-01 PROCEDURE — 99232 SBSQ HOSP IP/OBS MODERATE 35: CPT

## 2024-01-01 PROCEDURE — 97162 PT EVAL MOD COMPLEX 30 MIN: CPT

## 2024-01-01 PROCEDURE — 32556 INSERT CATH PLEURA W/O IMAGE: CPT | Mod: LT

## 2024-01-01 PROCEDURE — 87205 SMEAR GRAM STAIN: CPT

## 2024-01-01 PROCEDURE — 99497 ADVNCD CARE PLAN 30 MIN: CPT

## 2024-01-01 PROCEDURE — 99497 ADVNCD CARE PLAN 30 MIN: CPT | Mod: 25

## 2024-01-01 PROCEDURE — 83986 ASSAY PH BODY FLUID NOS: CPT

## 2024-01-01 PROCEDURE — 93923 UPR/LXTR ART STDY 3+ LVLS: CPT | Mod: 26

## 2024-01-01 PROCEDURE — 76705 ECHO EXAM OF ABDOMEN: CPT

## 2024-01-01 PROCEDURE — 99233 SBSQ HOSP IP/OBS HIGH 50: CPT

## 2024-01-01 PROCEDURE — 80048 BASIC METABOLIC PNL TOTAL CA: CPT

## 2024-01-01 PROCEDURE — 93010 ELECTROCARDIOGRAM REPORT: CPT

## 2024-01-01 PROCEDURE — 93005 ELECTROCARDIOGRAM TRACING: CPT

## 2024-01-01 PROCEDURE — 99222 1ST HOSP IP/OBS MODERATE 55: CPT | Mod: FS

## 2024-01-01 PROCEDURE — 82042 OTHER SOURCE ALBUMIN QUAN EA: CPT

## 2024-01-01 PROCEDURE — 71045 X-RAY EXAM CHEST 1 VIEW: CPT | Mod: 26

## 2024-01-01 PROCEDURE — 88112 CYTOPATH CELL ENHANCE TECH: CPT

## 2024-01-01 PROCEDURE — 85027 COMPLETE CBC AUTOMATED: CPT

## 2024-01-01 PROCEDURE — 87075 CULTR BACTERIA EXCEPT BLOOD: CPT

## 2024-01-01 PROCEDURE — 94760 N-INVAS EAR/PLS OXIMETRY 1: CPT

## 2024-01-01 PROCEDURE — 83615 LACTATE (LD) (LDH) ENZYME: CPT

## 2024-01-01 PROCEDURE — 76705 ECHO EXAM OF ABDOMEN: CPT | Mod: 26

## 2024-01-01 PROCEDURE — 88305 TISSUE EXAM BY PATHOLOGIST: CPT | Mod: 26

## 2024-01-01 PROCEDURE — 93306 TTE W/DOPPLER COMPLETE: CPT

## 2024-01-01 PROCEDURE — 71250 CT THORAX DX C-: CPT | Mod: 26

## 2024-01-01 PROCEDURE — 84484 ASSAY OF TROPONIN QUANT: CPT

## 2024-01-01 PROCEDURE — 97110 THERAPEUTIC EXERCISES: CPT

## 2024-01-01 PROCEDURE — 87102 FUNGUS ISOLATION CULTURE: CPT

## 2024-01-01 PROCEDURE — 84100 ASSAY OF PHOSPHORUS: CPT

## 2024-01-01 PROCEDURE — 83880 ASSAY OF NATRIURETIC PEPTIDE: CPT

## 2024-01-01 PROCEDURE — 80053 COMPREHEN METABOLIC PANEL: CPT

## 2024-01-01 PROCEDURE — 93970 EXTREMITY STUDY: CPT

## 2024-01-01 PROCEDURE — 99232 SBSQ HOSP IP/OBS MODERATE 35: CPT | Mod: GC

## 2024-01-01 PROCEDURE — 78226 HEPATOBILIARY SYSTEM IMAGING: CPT | Mod: 26,GC

## 2024-01-01 PROCEDURE — 99349 HOME/RES VST EST MOD MDM 40: CPT

## 2024-01-01 PROCEDURE — 82607 VITAMIN B-12: CPT

## 2024-01-01 PROCEDURE — 99285 EMERGENCY DEPT VISIT HI MDM: CPT

## 2024-01-01 PROCEDURE — 85610 PROTHROMBIN TIME: CPT

## 2024-01-01 PROCEDURE — 87070 CULTURE OTHR SPECIMN AEROBIC: CPT

## 2024-01-01 PROCEDURE — 82746 ASSAY OF FOLIC ACID SERUM: CPT

## 2024-01-01 PROCEDURE — 99239 HOSP IP/OBS DSCHRG MGMT >30: CPT

## 2024-01-01 PROCEDURE — 89051 BODY FLUID CELL COUNT: CPT

## 2024-01-01 PROCEDURE — 71045 X-RAY EXAM CHEST 1 VIEW: CPT

## 2024-01-01 PROCEDURE — 93970 EXTREMITY STUDY: CPT | Mod: 26

## 2024-01-01 PROCEDURE — 99232 SBSQ HOSP IP/OBS MODERATE 35: CPT | Mod: FS

## 2024-01-01 PROCEDURE — 36415 COLL VENOUS BLD VENIPUNCTURE: CPT

## 2024-01-01 PROCEDURE — 84157 ASSAY OF PROTEIN OTHER: CPT

## 2024-01-01 PROCEDURE — 99498 ADVNCD CARE PLAN ADDL 30 MIN: CPT | Mod: 25

## 2024-01-01 PROCEDURE — 93306 TTE W/DOPPLER COMPLETE: CPT | Mod: 26

## 2024-01-01 PROCEDURE — 99223 1ST HOSP IP/OBS HIGH 75: CPT | Mod: FS,57

## 2024-01-01 PROCEDURE — 99222 1ST HOSP IP/OBS MODERATE 55: CPT

## 2024-01-01 PROCEDURE — 99223 1ST HOSP IP/OBS HIGH 75: CPT

## 2024-01-01 PROCEDURE — 99497 ADVNCD CARE PLAN 30 MIN: CPT | Mod: GC

## 2024-01-01 PROCEDURE — 88112 CYTOPATH CELL ENHANCE TECH: CPT | Mod: 26

## 2024-01-01 PROCEDURE — 0225U NFCT DS DNA&RNA 21 SARSCOV2: CPT

## 2024-01-01 PROCEDURE — 96374 THER/PROPH/DIAG INJ IV PUSH: CPT

## 2024-01-01 PROCEDURE — 82945 GLUCOSE OTHER FLUID: CPT

## 2024-01-01 PROCEDURE — 97161 PT EVAL LOW COMPLEX 20 MIN: CPT

## 2024-01-01 PROCEDURE — 74230 X-RAY XM SWLNG FUNCJ C+: CPT | Mod: 26

## 2024-01-01 PROCEDURE — 71250 CT THORAX DX C-: CPT | Mod: MA

## 2024-01-01 PROCEDURE — 99233 SBSQ HOSP IP/OBS HIGH 50: CPT | Mod: GC

## 2024-01-01 PROCEDURE — 99285 EMERGENCY DEPT VISIT HI MDM: CPT | Mod: 25

## 2024-01-01 PROCEDURE — 97166 OT EVAL MOD COMPLEX 45 MIN: CPT

## 2024-01-01 PROCEDURE — 99222 1ST HOSP IP/OBS MODERATE 55: CPT | Mod: GC

## 2024-01-01 PROCEDURE — 83735 ASSAY OF MAGNESIUM: CPT

## 2024-01-01 PROCEDURE — 83036 HEMOGLOBIN GLYCOSYLATED A1C: CPT

## 2024-01-01 PROCEDURE — 99231 SBSQ HOSP IP/OBS SF/LOW 25: CPT

## 2024-01-01 PROCEDURE — G0316 PROLONG INPT EVAL ADD15 M: CPT

## 2024-01-01 PROCEDURE — 93308 TTE F-UP OR LMTD: CPT

## 2024-01-01 PROCEDURE — 71045 X-RAY EXAM CHEST 1 VIEW: CPT | Mod: 26,76

## 2024-01-01 PROCEDURE — 88305 TISSUE EXAM BY PATHOLOGIST: CPT

## 2024-01-01 PROCEDURE — 71250 CT THORAX DX C-: CPT

## 2024-01-01 PROCEDURE — 86850 RBC ANTIBODY SCREEN: CPT

## 2024-01-01 PROCEDURE — 99239 HOSP IP/OBS DSCHRG MGMT >30: CPT | Mod: GC

## 2024-01-01 PROCEDURE — 76770 US EXAM ABDO BACK WALL COMP: CPT | Mod: 26

## 2024-01-01 PROCEDURE — 99498 ADVNCD CARE PLAN ADDL 30 MIN: CPT

## 2024-01-01 PROCEDURE — 85025 COMPLETE CBC W/AUTO DIFF WBC: CPT

## 2024-01-01 PROCEDURE — 99223 1ST HOSP IP/OBS HIGH 75: CPT | Mod: FS

## 2024-01-01 PROCEDURE — 84443 ASSAY THYROID STIM HORMONE: CPT

## 2024-01-01 PROCEDURE — 80061 LIPID PANEL: CPT

## 2024-01-01 PROCEDURE — 97116 GAIT TRAINING THERAPY: CPT

## 2024-01-01 PROCEDURE — 94640 AIRWAY INHALATION TREATMENT: CPT

## 2024-01-01 PROCEDURE — 86900 BLOOD TYPING SEROLOGIC ABO: CPT

## 2024-01-01 PROCEDURE — 99223 1ST HOSP IP/OBS HIGH 75: CPT | Mod: GC

## 2024-01-01 PROCEDURE — 86901 BLOOD TYPING SEROLOGIC RH(D): CPT

## 2024-01-01 RX ORDER — SODIUM CHLORIDE 0.65 %
1 AEROSOL, SPRAY (ML) NASAL
Refills: 0 | Status: DISCONTINUED | OUTPATIENT
Start: 2024-01-01 | End: 2024-01-01

## 2024-01-01 RX ORDER — HEPARIN SODIUM 5000 [USP'U]/ML
5000 INJECTION INTRAVENOUS; SUBCUTANEOUS EVERY 12 HOURS
Refills: 0 | Status: DISCONTINUED | OUTPATIENT
Start: 2024-01-01 | End: 2024-01-01

## 2024-01-01 RX ORDER — HEPARIN SODIUM 5000 [USP'U]/ML
INJECTION INTRAVENOUS; SUBCUTANEOUS
Qty: 25000 | Refills: 0 | Status: DISCONTINUED | OUTPATIENT
Start: 2024-01-01 | End: 2024-01-01

## 2024-01-01 RX ORDER — ALLOPURINOL 100 MG/1
100 TABLET ORAL
Refills: 0 | Status: ACTIVE | COMMUNITY
Start: 2024-01-01

## 2024-01-01 RX ORDER — POLYETHYLENE GLYCOL 3350 17 G/17G
17 POWDER, FOR SOLUTION ORAL
Qty: 0 | Refills: 0 | DISCHARGE
Start: 2024-01-01

## 2024-01-01 RX ORDER — IPRATROPIUM/ALBUTEROL SULFATE 18-103MCG
3 AEROSOL WITH ADAPTER (GRAM) INHALATION ONCE
Refills: 0 | Status: COMPLETED | OUTPATIENT
Start: 2024-01-01 | End: 2024-01-01

## 2024-01-01 RX ORDER — PETROLATUM,WHITE
1 JELLY (GRAM) TOPICAL
Qty: 0 | Refills: 0 | DISCHARGE
Start: 2024-01-01

## 2024-01-01 RX ORDER — TIOTROPIUM BROMIDE 18 UG/1
2 CAPSULE ORAL; RESPIRATORY (INHALATION)
Qty: 0 | Refills: 0 | DISCHARGE
Start: 2024-01-01

## 2024-01-01 RX ORDER — DEXTROSE 50 % IN WATER 50 %
50 SYRINGE (ML) INTRAVENOUS ONCE
Refills: 0 | Status: COMPLETED | OUTPATIENT
Start: 2024-01-01 | End: 2024-01-01

## 2024-01-01 RX ORDER — IPRATROPIUM/ALBUTEROL SULFATE 18-103MCG
3 AEROSOL WITH ADAPTER (GRAM) INHALATION EVERY 6 HOURS
Refills: 0 | Status: DISCONTINUED | OUTPATIENT
Start: 2024-01-01 | End: 2024-01-01

## 2024-01-01 RX ORDER — SODIUM CHLORIDE 9 MG/ML
4 INJECTION INTRAMUSCULAR; INTRAVENOUS; SUBCUTANEOUS EVERY 12 HOURS
Refills: 0 | Status: DISCONTINUED | OUTPATIENT
Start: 2024-01-01 | End: 2024-01-01

## 2024-01-01 RX ORDER — LANOLIN ALCOHOL/MO/W.PET/CERES
1 CREAM (GRAM) TOPICAL
Qty: 0 | Refills: 0 | DISCHARGE
Start: 2024-01-01

## 2024-01-01 RX ORDER — HEPARIN SODIUM 5000 [USP'U]/ML
5500 INJECTION INTRAVENOUS; SUBCUTANEOUS EVERY 6 HOURS
Refills: 0 | Status: DISCONTINUED | OUTPATIENT
Start: 2024-01-01 | End: 2024-01-01

## 2024-01-01 RX ORDER — FUROSEMIDE 40 MG
40 TABLET ORAL
Qty: 0 | Refills: 0 | DISCHARGE
Start: 2024-01-01

## 2024-01-01 RX ORDER — HEPARIN SODIUM 5000 [USP'U]/ML
800 INJECTION INTRAVENOUS; SUBCUTANEOUS
Qty: 25000 | Refills: 0 | Status: DISCONTINUED | OUTPATIENT
Start: 2024-01-01 | End: 2024-01-01

## 2024-01-01 RX ORDER — QUETIAPINE FUMARATE 200 MG/1
12.5 TABLET, FILM COATED ORAL ONCE
Refills: 0 | Status: COMPLETED | OUTPATIENT
Start: 2024-01-01 | End: 2024-01-01

## 2024-01-01 RX ORDER — ALBUTEROL 90 UG/1
2 AEROSOL, METERED ORAL
Qty: 0 | Refills: 0 | DISCHARGE
Start: 2024-01-01

## 2024-01-01 RX ORDER — SODIUM CHLORIDE 9 MG/ML
500 INJECTION, SOLUTION INTRAVENOUS
Refills: 0 | Status: DISCONTINUED | OUTPATIENT
Start: 2024-01-01 | End: 2024-01-01

## 2024-01-01 RX ORDER — FUROSEMIDE 40 MG
40 TABLET ORAL DAILY
Refills: 0 | Status: DISCONTINUED | OUTPATIENT
Start: 2024-01-01 | End: 2024-01-01

## 2024-01-01 RX ORDER — LANOLIN ALCOHOL/MO/W.PET/CERES
3 CREAM (GRAM) TOPICAL AT BEDTIME
Refills: 0 | Status: DISCONTINUED | OUTPATIENT
Start: 2024-01-01 | End: 2024-01-01

## 2024-01-01 RX ORDER — HYDROMORPHONE HYDROCHLORIDE 2 MG/ML
0.2 INJECTION INTRAMUSCULAR; INTRAVENOUS; SUBCUTANEOUS ONCE
Refills: 0 | Status: DISCONTINUED | OUTPATIENT
Start: 2024-01-01 | End: 2024-01-01

## 2024-01-01 RX ORDER — FUROSEMIDE 40 MG
40 TABLET ORAL EVERY 12 HOURS
Refills: 0 | Status: DISCONTINUED | OUTPATIENT
Start: 2024-01-01 | End: 2024-01-01

## 2024-01-01 RX ORDER — ACETAMINOPHEN 500 MG
650 TABLET ORAL EVERY 4 HOURS
Refills: 0 | Status: DISCONTINUED | OUTPATIENT
Start: 2024-01-01 | End: 2024-01-01

## 2024-01-01 RX ORDER — APIXABAN 2.5 MG/1
2.5 TABLET, FILM COATED ORAL EVERY 12 HOURS
Refills: 0 | Status: DISCONTINUED | OUTPATIENT
Start: 2024-01-01 | End: 2024-01-01

## 2024-01-01 RX ORDER — POLYETHYLENE GLYCOL 3350 17 G/17G
17 POWDER, FOR SOLUTION ORAL
Refills: 0 | Status: DISCONTINUED | OUTPATIENT
Start: 2024-01-01 | End: 2024-01-01

## 2024-01-01 RX ORDER — SODIUM CHLORIDE 9 MG/ML
1000 INJECTION, SOLUTION INTRAVENOUS
Refills: 0 | Status: DISCONTINUED | OUTPATIENT
Start: 2024-01-01 | End: 2024-01-01

## 2024-01-01 RX ORDER — ASPIRIN/CALCIUM CARB/MAGNESIUM 324 MG
81 TABLET ORAL DAILY
Refills: 0 | Status: DISCONTINUED | OUTPATIENT
Start: 2024-01-01 | End: 2024-01-01

## 2024-01-01 RX ORDER — FUROSEMIDE 40 MG
40 TABLET ORAL ONCE
Refills: 0 | Status: DISCONTINUED | OUTPATIENT
Start: 2024-01-01 | End: 2024-01-01

## 2024-01-01 RX ORDER — DAPAGLIFLOZIN 10 MG/1
10 TABLET, FILM COATED ORAL EVERY 24 HOURS
Refills: 0 | Status: DISCONTINUED | OUTPATIENT
Start: 2024-01-01 | End: 2024-01-01

## 2024-01-01 RX ORDER — ONDANSETRON 8 MG/1
4 TABLET, FILM COATED ORAL EVERY 8 HOURS
Refills: 0 | Status: DISCONTINUED | OUTPATIENT
Start: 2024-01-01 | End: 2024-01-01

## 2024-01-01 RX ORDER — POTASSIUM CHLORIDE 20 MEQ
40 PACKET (EA) ORAL ONCE
Refills: 0 | Status: COMPLETED | OUTPATIENT
Start: 2024-01-01 | End: 2024-01-01

## 2024-01-01 RX ORDER — TIOTROPIUM BROMIDE 18 UG/1
2 CAPSULE ORAL; RESPIRATORY (INHALATION) DAILY
Refills: 0 | Status: DISCONTINUED | OUTPATIENT
Start: 2024-01-01 | End: 2024-01-01

## 2024-01-01 RX ORDER — DEXAMETHASONE 0.5 MG/5ML
6 ELIXIR ORAL DAILY
Refills: 0 | Status: DISCONTINUED | OUTPATIENT
Start: 2024-01-01 | End: 2024-01-01

## 2024-01-01 RX ORDER — REMDESIVIR 5 MG/ML
200 INJECTION INTRAVENOUS EVERY 24 HOURS
Refills: 0 | Status: COMPLETED | OUTPATIENT
Start: 2024-01-01 | End: 2024-01-01

## 2024-01-01 RX ORDER — INSULIN HUMAN 100 [IU]/ML
5 INJECTION, SOLUTION SUBCUTANEOUS ONCE
Refills: 0 | Status: COMPLETED | OUTPATIENT
Start: 2024-01-01 | End: 2024-01-01

## 2024-01-01 RX ORDER — HEPARIN SODIUM 5000 [USP'U]/ML
2500 INJECTION INTRAVENOUS; SUBCUTANEOUS EVERY 6 HOURS
Refills: 0 | Status: DISCONTINUED | OUTPATIENT
Start: 2024-01-01 | End: 2024-01-01

## 2024-01-01 RX ORDER — FUROSEMIDE 40 MG
20 TABLET ORAL ONCE
Refills: 0 | Status: COMPLETED | OUTPATIENT
Start: 2024-01-01 | End: 2024-01-01

## 2024-01-01 RX ORDER — PETROLATUM,WHITE
1 JELLY (GRAM) TOPICAL THREE TIMES A DAY
Refills: 0 | Status: DISCONTINUED | OUTPATIENT
Start: 2024-01-01 | End: 2024-01-01

## 2024-01-01 RX ORDER — BUDESONIDE AND FORMOTEROL FUMARATE DIHYDRATE 160; 4.5 UG/1; UG/1
2 AEROSOL RESPIRATORY (INHALATION)
Refills: 0 | Status: DISCONTINUED | OUTPATIENT
Start: 2024-01-01 | End: 2024-01-01

## 2024-01-01 RX ORDER — DAPAGLIFLOZIN 10 MG/1
10 TABLET, FILM COATED ORAL DAILY
Refills: 0 | Status: DISCONTINUED | OUTPATIENT
Start: 2024-01-01 | End: 2024-01-01

## 2024-01-01 RX ORDER — ENOXAPARIN SODIUM 100 MG/ML
70 INJECTION SUBCUTANEOUS EVERY 24 HOURS
Refills: 0 | Status: DISCONTINUED | OUTPATIENT
Start: 2024-01-01 | End: 2024-01-01

## 2024-01-01 RX ORDER — SENNA PLUS 8.6 MG/1
2 TABLET ORAL AT BEDTIME
Refills: 0 | Status: DISCONTINUED | OUTPATIENT
Start: 2024-01-01 | End: 2024-01-01

## 2024-01-01 RX ORDER — ACETAMINOPHEN 500 MG
1000 TABLET ORAL ONCE
Refills: 0 | Status: DISCONTINUED | OUTPATIENT
Start: 2024-01-01 | End: 2024-01-01

## 2024-01-01 RX ORDER — METOPROLOL TARTRATE 50 MG
5 TABLET ORAL EVERY 6 HOURS
Refills: 0 | Status: DISCONTINUED | OUTPATIENT
Start: 2024-01-01 | End: 2024-01-01

## 2024-01-01 RX ORDER — IPRATROPIUM BROMIDE 0.2 MG/ML
1 SOLUTION, NON-ORAL INHALATION EVERY 6 HOURS
Refills: 0 | Status: DISCONTINUED | OUTPATIENT
Start: 2024-01-01 | End: 2024-01-01

## 2024-01-01 RX ORDER — ACETAMINOPHEN 500 MG
650 TABLET ORAL EVERY 6 HOURS
Refills: 0 | Status: DISCONTINUED | OUTPATIENT
Start: 2024-01-01 | End: 2024-01-01

## 2024-01-01 RX ORDER — HALOPERIDOL DECANOATE 100 MG/ML
5 INJECTION INTRAMUSCULAR ONCE
Refills: 0 | Status: COMPLETED | OUTPATIENT
Start: 2024-01-01 | End: 2024-01-01

## 2024-01-01 RX ORDER — LIDOCAINE 4 G/100G
1 CREAM TOPICAL ONCE
Refills: 0 | Status: COMPLETED | OUTPATIENT
Start: 2024-01-01 | End: 2024-01-01

## 2024-01-01 RX ORDER — DIPHENHYDRAMINE HCL/ZINC ACET 2 %-0.1 %
1 CREAM (GRAM) TOPICAL
Qty: 0 | Refills: 0 | DISCHARGE
Start: 2024-01-01

## 2024-01-01 RX ORDER — ALLOPURINOL 300 MG
100 TABLET ORAL DAILY
Refills: 0 | Status: DISCONTINUED | OUTPATIENT
Start: 2024-01-01 | End: 2024-01-01

## 2024-01-01 RX ORDER — TIOTROPIUM BROMIDE INHALATION SPRAY 3.12 UG/1
2.5 SPRAY, METERED RESPIRATORY (INHALATION) DAILY
Qty: 1 | Refills: 0 | Status: ACTIVE | COMMUNITY
Start: 2024-01-01 | End: 1900-01-01

## 2024-01-01 RX ORDER — METOPROLOL TARTRATE 50 MG
50 TABLET ORAL DAILY
Refills: 0 | Status: DISCONTINUED | OUTPATIENT
Start: 2024-01-01 | End: 2024-01-01

## 2024-01-01 RX ORDER — FUROSEMIDE 40 MG
40 TABLET ORAL
Refills: 0 | Status: DISCONTINUED | OUTPATIENT
Start: 2024-01-01 | End: 2024-01-01

## 2024-01-01 RX ORDER — REMDESIVIR 5 MG/ML
100 INJECTION INTRAVENOUS EVERY 24 HOURS
Refills: 0 | Status: COMPLETED | OUTPATIENT
Start: 2024-01-01 | End: 2024-01-01

## 2024-01-01 RX ORDER — ATORVASTATIN CALCIUM 80 MG/1
40 TABLET, FILM COATED ORAL AT BEDTIME
Refills: 0 | Status: DISCONTINUED | OUTPATIENT
Start: 2024-01-01 | End: 2024-01-01

## 2024-01-01 RX ORDER — APIXABAN 2.5 MG/1
2.5 TABLET, FILM COATED ORAL
Refills: 0 | Status: ACTIVE | COMMUNITY
Start: 2024-01-01

## 2024-01-01 RX ORDER — HEPARIN SODIUM 5000 [USP'U]/ML
900 INJECTION INTRAVENOUS; SUBCUTANEOUS
Qty: 25000 | Refills: 0 | Status: DISCONTINUED | OUTPATIENT
Start: 2024-01-01 | End: 2024-01-01

## 2024-01-01 RX ORDER — ATORVASTATIN CALCIUM 80 MG/1
1 TABLET, FILM COATED ORAL
Qty: 0 | Refills: 0 | DISCHARGE
Start: 2024-01-01

## 2024-01-01 RX ORDER — ACETAMINOPHEN 500 MG
1000 TABLET ORAL ONCE
Refills: 0 | Status: COMPLETED | OUTPATIENT
Start: 2024-01-01 | End: 2024-01-01

## 2024-01-01 RX ORDER — ACETYLCYSTEINE 200 MG/ML
4 VIAL (ML) MISCELLANEOUS THREE TIMES A DAY
Refills: 0 | Status: COMPLETED | OUTPATIENT
Start: 2024-01-01 | End: 2024-01-01

## 2024-01-01 RX ORDER — ALBUTEROL 90 UG/1
2 AEROSOL, METERED ORAL EVERY 6 HOURS
Refills: 0 | Status: DISCONTINUED | OUTPATIENT
Start: 2024-01-01 | End: 2024-01-01

## 2024-01-01 RX ORDER — FUROSEMIDE 40 MG
20 TABLET ORAL DAILY
Refills: 0 | Status: DISCONTINUED | OUTPATIENT
Start: 2024-01-01 | End: 2024-01-01

## 2024-01-01 RX ORDER — ALBUTEROL SULFATE 90 UG/1
108 (90 BASE) INHALANT RESPIRATORY (INHALATION) EVERY 6 HOURS
Qty: 1 | Refills: 0 | Status: ACTIVE | COMMUNITY
Start: 2024-01-01 | End: 1900-01-01

## 2024-01-01 RX ORDER — HYDROMORPHONE HYDROCHLORIDE 2 MG/ML
0.5 INJECTION INTRAMUSCULAR; INTRAVENOUS; SUBCUTANEOUS ONCE
Refills: 0 | Status: DISCONTINUED | OUTPATIENT
Start: 2024-01-01 | End: 2024-01-01

## 2024-01-01 RX ORDER — CEFTRIAXONE 500 MG/1
1000 INJECTION, POWDER, FOR SOLUTION INTRAMUSCULAR; INTRAVENOUS EVERY 24 HOURS
Refills: 0 | Status: DISCONTINUED | OUTPATIENT
Start: 2024-01-01 | End: 2024-01-01

## 2024-01-01 RX ORDER — BACITRACIN ZINC 500 UNIT/G
1 OINTMENT IN PACKET (EA) TOPICAL DAILY
Refills: 0 | Status: DISCONTINUED | OUTPATIENT
Start: 2024-01-01 | End: 2024-01-01

## 2024-01-01 RX ORDER — HEPARIN SODIUM 5000 [USP'U]/ML
5500 INJECTION INTRAVENOUS; SUBCUTANEOUS ONCE
Refills: 0 | Status: DISCONTINUED | OUTPATIENT
Start: 2024-01-01 | End: 2024-01-01

## 2024-01-01 RX ORDER — PIPERACILLIN AND TAZOBACTAM 4; .5 G/20ML; G/20ML
3.38 INJECTION, POWDER, LYOPHILIZED, FOR SOLUTION INTRAVENOUS EVERY 12 HOURS
Refills: 0 | Status: DISCONTINUED | OUTPATIENT
Start: 2024-01-01 | End: 2024-01-01

## 2024-01-01 RX ORDER — POTASSIUM CHLORIDE 20 MEQ
20 PACKET (EA) ORAL ONCE
Refills: 0 | Status: COMPLETED | OUTPATIENT
Start: 2024-01-01 | End: 2024-01-01

## 2024-01-01 RX ORDER — PANTOPRAZOLE SODIUM 20 MG/1
40 TABLET, DELAYED RELEASE ORAL
Refills: 0 | Status: DISCONTINUED | OUTPATIENT
Start: 2024-01-01 | End: 2024-01-01

## 2024-01-01 RX ORDER — APIXABAN 2.5 MG/1
1 TABLET, FILM COATED ORAL
Qty: 0 | Refills: 0 | DISCHARGE
Start: 2024-01-01

## 2024-01-01 RX ORDER — METOPROLOL TARTRATE 50 MG
1 TABLET ORAL
Qty: 0 | Refills: 0 | DISCHARGE
Start: 2024-01-01

## 2024-01-01 RX ORDER — REMDESIVIR 5 MG/ML
INJECTION INTRAVENOUS
Refills: 0 | Status: COMPLETED | OUTPATIENT
Start: 2024-01-01 | End: 2024-01-01

## 2024-01-01 RX ORDER — SODIUM CHLORIDE 9 MG/ML
1000 INJECTION INTRAMUSCULAR; INTRAVENOUS; SUBCUTANEOUS
Refills: 0 | Status: DISCONTINUED | OUTPATIENT
Start: 2024-01-01 | End: 2024-01-01

## 2024-01-01 RX ORDER — PETROLATUM,WHITE
1 JELLY (GRAM) TOPICAL
Qty: 1 | Refills: 0
Start: 2024-01-01 | End: 2024-01-01

## 2024-01-01 RX ORDER — ACETAMINOPHEN 500 MG
2 TABLET ORAL
Qty: 0 | Refills: 0 | DISCHARGE
Start: 2024-01-01

## 2024-01-01 RX ORDER — FUROSEMIDE 40 MG
40 TABLET ORAL ONCE
Refills: 0 | Status: COMPLETED | OUTPATIENT
Start: 2024-01-01 | End: 2024-01-01

## 2024-01-01 RX ORDER — HYDROMORPHONE HYDROCHLORIDE 2 MG/ML
0.5 INJECTION INTRAMUSCULAR; INTRAVENOUS; SUBCUTANEOUS
Refills: 0 | Status: DISCONTINUED | OUTPATIENT
Start: 2024-01-01 | End: 2024-01-01

## 2024-01-01 RX ORDER — ASPIRIN ENTERIC COATED TABLETS 81 MG 81 MG/1
81 TABLET, DELAYED RELEASE ORAL DAILY
Refills: 0 | Status: ACTIVE | COMMUNITY
Start: 2024-01-01

## 2024-01-01 RX ORDER — PANTOPRAZOLE 40 MG/1
40 TABLET, DELAYED RELEASE ORAL
Qty: 30 | Refills: 0 | Status: ACTIVE | COMMUNITY
Start: 2024-01-01 | End: 1900-01-01

## 2024-01-01 RX ORDER — PETROLATUM,WHITE
1 JELLY (GRAM) TOPICAL EVERY 8 HOURS
Refills: 0 | Status: DISCONTINUED | OUTPATIENT
Start: 2024-01-01 | End: 2024-01-01

## 2024-01-01 RX ORDER — METRONIDAZOLE 500 MG
500 TABLET ORAL EVERY 8 HOURS
Refills: 0 | Status: DISCONTINUED | OUTPATIENT
Start: 2024-01-01 | End: 2024-01-01

## 2024-01-01 RX ORDER — ROBINUL 0.2 MG/ML
0.4 INJECTION INTRAMUSCULAR; INTRAVENOUS
Refills: 0 | Status: DISCONTINUED | OUTPATIENT
Start: 2024-01-01 | End: 2024-01-01

## 2024-01-01 RX ORDER — HYDROMORPHONE HYDROCHLORIDE 2 MG/ML
0.2 INJECTION INTRAMUSCULAR; INTRAVENOUS; SUBCUTANEOUS
Refills: 0 | Status: DISCONTINUED | OUTPATIENT
Start: 2024-01-01 | End: 2024-01-01

## 2024-01-01 RX ORDER — PANTOPRAZOLE SODIUM 20 MG/1
1 TABLET, DELAYED RELEASE ORAL
Qty: 0 | Refills: 0 | DISCHARGE
Start: 2024-01-01

## 2024-01-01 RX ORDER — METOPROLOL SUCCINATE 50 MG/1
50 TABLET, EXTENDED RELEASE ORAL
Refills: 0 | Status: ACTIVE | COMMUNITY
Start: 2024-01-01

## 2024-01-01 RX ORDER — ASPIRIN/CALCIUM CARB/MAGNESIUM 324 MG
1 TABLET ORAL
Qty: 0 | Refills: 0 | DISCHARGE
Start: 2024-01-01

## 2024-01-01 RX ORDER — BUDESONIDE AND FORMOTEROL FUMARATE DIHYDRATE 160; 4.5 UG/1; UG/1
160-4.5 AEROSOL RESPIRATORY (INHALATION) TWICE DAILY
Qty: 1 | Refills: 0 | Status: ACTIVE | COMMUNITY
Start: 2024-01-01 | End: 1900-01-01

## 2024-01-01 RX ORDER — ATORVASTATIN CALCIUM 40 MG/1
40 TABLET, FILM COATED ORAL
Refills: 0 | Status: ACTIVE | COMMUNITY
Start: 2024-01-01

## 2024-01-01 RX ORDER — SODIUM CHLORIDE 9 MG/ML
3 INJECTION INTRAMUSCULAR; INTRAVENOUS; SUBCUTANEOUS EVERY 6 HOURS
Refills: 0 | Status: DISCONTINUED | OUTPATIENT
Start: 2024-01-01 | End: 2024-01-01

## 2024-01-01 RX ORDER — ALLOPURINOL 300 MG
1 TABLET ORAL
Qty: 0 | Refills: 0 | DISCHARGE
Start: 2024-01-01

## 2024-01-01 RX ORDER — POTASSIUM CHLORIDE 20 MEQ
40 PACKET (EA) ORAL EVERY 4 HOURS
Refills: 0 | Status: COMPLETED | OUTPATIENT
Start: 2024-01-01 | End: 2024-01-01

## 2024-01-01 RX ADMIN — Medication 3 MILLIGRAM(S): at 21:28

## 2024-01-01 RX ADMIN — Medication 1 APPLICATION(S): at 05:12

## 2024-01-01 RX ADMIN — Medication 1 APPLICATION(S): at 05:48

## 2024-01-01 RX ADMIN — BUDESONIDE AND FORMOTEROL FUMARATE DIHYDRATE 2 PUFF(S): 160; 4.5 AEROSOL RESPIRATORY (INHALATION) at 05:54

## 2024-01-01 RX ADMIN — Medication 50 MILLIGRAM(S): at 07:02

## 2024-01-01 RX ADMIN — Medication 3 MILLIGRAM(S): at 23:22

## 2024-01-01 RX ADMIN — TIOTROPIUM BROMIDE 2 PUFF(S): 18 CAPSULE ORAL; RESPIRATORY (INHALATION) at 09:44

## 2024-01-01 RX ADMIN — HEPARIN SODIUM 1000 UNIT(S)/HR: 5000 INJECTION INTRAVENOUS; SUBCUTANEOUS at 22:39

## 2024-01-01 RX ADMIN — HEPARIN SODIUM 5500 UNIT(S): 5000 INJECTION INTRAVENOUS; SUBCUTANEOUS at 15:39

## 2024-01-01 RX ADMIN — Medication 81 MILLIGRAM(S): at 13:26

## 2024-01-01 RX ADMIN — Medication 1 PUFF(S): at 22:34

## 2024-01-01 RX ADMIN — HEPARIN SODIUM 900 UNIT(S)/HR: 5000 INJECTION INTRAVENOUS; SUBCUTANEOUS at 19:55

## 2024-01-01 RX ADMIN — Medication 1 APPLICATION(S): at 17:05

## 2024-01-01 RX ADMIN — SENNA PLUS 2 TABLET(S): 8.6 TABLET ORAL at 21:29

## 2024-01-01 RX ADMIN — CEFTRIAXONE 100 MILLIGRAM(S): 500 INJECTION, POWDER, FOR SOLUTION INTRAMUSCULAR; INTRAVENOUS at 13:16

## 2024-01-01 RX ADMIN — Medication 81 MILLIGRAM(S): at 12:48

## 2024-01-01 RX ADMIN — HYDROMORPHONE HYDROCHLORIDE 0.5 MILLIGRAM(S): 2 INJECTION INTRAMUSCULAR; INTRAVENOUS; SUBCUTANEOUS at 11:01

## 2024-01-01 RX ADMIN — DAPAGLIFLOZIN 10 MILLIGRAM(S): 10 TABLET, FILM COATED ORAL at 13:15

## 2024-01-01 RX ADMIN — PANTOPRAZOLE SODIUM 40 MILLIGRAM(S): 20 TABLET, DELAYED RELEASE ORAL at 05:48

## 2024-01-01 RX ADMIN — Medication 1 PUFF(S): at 09:45

## 2024-01-01 RX ADMIN — Medication 650 MILLIGRAM(S): at 14:14

## 2024-01-01 RX ADMIN — Medication 1 APPLICATION(S): at 18:06

## 2024-01-01 RX ADMIN — Medication 100 MILLIGRAM(S): at 12:59

## 2024-01-01 RX ADMIN — APIXABAN 2.5 MILLIGRAM(S): 2.5 TABLET, FILM COATED ORAL at 17:05

## 2024-01-01 RX ADMIN — DAPAGLIFLOZIN 10 MILLIGRAM(S): 10 TABLET, FILM COATED ORAL at 13:26

## 2024-01-01 RX ADMIN — SENNA PLUS 2 TABLET(S): 8.6 TABLET ORAL at 21:01

## 2024-01-01 RX ADMIN — ATORVASTATIN CALCIUM 40 MILLIGRAM(S): 80 TABLET, FILM COATED ORAL at 21:37

## 2024-01-01 RX ADMIN — HEPARIN SODIUM 800 UNIT(S)/HR: 5000 INJECTION INTRAVENOUS; SUBCUTANEOUS at 07:53

## 2024-01-01 RX ADMIN — Medication 100 MILLIGRAM(S): at 13:53

## 2024-01-01 RX ADMIN — PIPERACILLIN AND TAZOBACTAM 25 GRAM(S): 4; .5 INJECTION, POWDER, LYOPHILIZED, FOR SOLUTION INTRAVENOUS at 17:34

## 2024-01-01 RX ADMIN — Medication 40 MILLIGRAM(S): at 05:40

## 2024-01-01 RX ADMIN — Medication 1 APPLICATION(S): at 13:47

## 2024-01-01 RX ADMIN — Medication 81 MILLIGRAM(S): at 12:56

## 2024-01-01 RX ADMIN — Medication 81 MILLIGRAM(S): at 11:24

## 2024-01-01 RX ADMIN — SENNA PLUS 2 TABLET(S): 8.6 TABLET ORAL at 21:00

## 2024-01-01 RX ADMIN — BUDESONIDE AND FORMOTEROL FUMARATE DIHYDRATE 2 PUFF(S): 160; 4.5 AEROSOL RESPIRATORY (INHALATION) at 09:44

## 2024-01-01 RX ADMIN — POLYETHYLENE GLYCOL 3350 17 GRAM(S): 17 POWDER, FOR SOLUTION ORAL at 17:31

## 2024-01-01 RX ADMIN — PANTOPRAZOLE SODIUM 40 MILLIGRAM(S): 20 TABLET, DELAYED RELEASE ORAL at 05:31

## 2024-01-01 RX ADMIN — APIXABAN 2.5 MILLIGRAM(S): 2.5 TABLET, FILM COATED ORAL at 18:14

## 2024-01-01 RX ADMIN — Medication 40 MILLIGRAM(S): at 05:48

## 2024-01-01 RX ADMIN — Medication 81 MILLIGRAM(S): at 12:28

## 2024-01-01 RX ADMIN — Medication 81 MILLIGRAM(S): at 13:44

## 2024-01-01 RX ADMIN — BUDESONIDE AND FORMOTEROL FUMARATE DIHYDRATE 2 PUFF(S): 160; 4.5 AEROSOL RESPIRATORY (INHALATION) at 09:34

## 2024-01-01 RX ADMIN — Medication 20 MILLIGRAM(S): at 05:40

## 2024-01-01 RX ADMIN — Medication 1 PUFF(S): at 18:46

## 2024-01-01 RX ADMIN — ALBUTEROL 2 PUFF(S): 90 AEROSOL, METERED ORAL at 04:40

## 2024-01-01 RX ADMIN — HEPARIN SODIUM 900 UNIT(S)/HR: 5000 INJECTION INTRAVENOUS; SUBCUTANEOUS at 19:27

## 2024-01-01 RX ADMIN — Medication 6 MILLIGRAM(S): at 06:27

## 2024-01-01 RX ADMIN — TIOTROPIUM BROMIDE 2 PUFF(S): 18 CAPSULE ORAL; RESPIRATORY (INHALATION) at 09:31

## 2024-01-01 RX ADMIN — ATORVASTATIN CALCIUM 40 MILLIGRAM(S): 80 TABLET, FILM COATED ORAL at 21:20

## 2024-01-01 RX ADMIN — REMDESIVIR 200 MILLIGRAM(S): 5 INJECTION INTRAVENOUS at 13:35

## 2024-01-01 RX ADMIN — POLYETHYLENE GLYCOL 3350 17 GRAM(S): 17 POWDER, FOR SOLUTION ORAL at 16:22

## 2024-01-01 RX ADMIN — POLYETHYLENE GLYCOL 3350 17 GRAM(S): 17 POWDER, FOR SOLUTION ORAL at 17:54

## 2024-01-01 RX ADMIN — PANTOPRAZOLE SODIUM 40 MILLIGRAM(S): 20 TABLET, DELAYED RELEASE ORAL at 07:02

## 2024-01-01 RX ADMIN — Medication 50 MILLIGRAM(S): at 05:29

## 2024-01-01 RX ADMIN — BUDESONIDE AND FORMOTEROL FUMARATE DIHYDRATE 2 PUFF(S): 160; 4.5 AEROSOL RESPIRATORY (INHALATION) at 09:02

## 2024-01-01 RX ADMIN — Medication 100 MILLIGRAM(S): at 12:56

## 2024-01-01 RX ADMIN — Medication 81 MILLIGRAM(S): at 13:02

## 2024-01-01 RX ADMIN — POLYETHYLENE GLYCOL 3350 17 GRAM(S): 17 POWDER, FOR SOLUTION ORAL at 05:06

## 2024-01-01 RX ADMIN — DAPAGLIFLOZIN 10 MILLIGRAM(S): 10 TABLET, FILM COATED ORAL at 13:44

## 2024-01-01 RX ADMIN — HEPARIN SODIUM 5000 UNIT(S): 5000 INJECTION INTRAVENOUS; SUBCUTANEOUS at 18:43

## 2024-01-01 RX ADMIN — TIOTROPIUM BROMIDE 2 PUFF(S): 18 CAPSULE ORAL; RESPIRATORY (INHALATION) at 13:24

## 2024-01-01 RX ADMIN — PANTOPRAZOLE SODIUM 40 MILLIGRAM(S): 20 TABLET, DELAYED RELEASE ORAL at 05:06

## 2024-01-01 RX ADMIN — HEPARIN SODIUM 900 UNIT(S)/HR: 5000 INJECTION INTRAVENOUS; SUBCUTANEOUS at 19:26

## 2024-01-01 RX ADMIN — ALBUTEROL 2 PUFF(S): 90 AEROSOL, METERED ORAL at 18:06

## 2024-01-01 RX ADMIN — LIDOCAINE 1 PATCH: 4 CREAM TOPICAL at 02:57

## 2024-01-01 RX ADMIN — Medication 1 APPLICATION(S): at 22:13

## 2024-01-01 RX ADMIN — HEPARIN SODIUM 900 UNIT(S)/HR: 5000 INJECTION INTRAVENOUS; SUBCUTANEOUS at 21:15

## 2024-01-01 RX ADMIN — SENNA PLUS 2 TABLET(S): 8.6 TABLET ORAL at 21:35

## 2024-01-01 RX ADMIN — Medication 40 MILLIGRAM(S): at 14:23

## 2024-01-01 RX ADMIN — Medication 50 MILLIGRAM(S): at 05:32

## 2024-01-01 RX ADMIN — ATORVASTATIN CALCIUM 40 MILLIGRAM(S): 80 TABLET, FILM COATED ORAL at 23:22

## 2024-01-01 RX ADMIN — Medication 100 MILLIGRAM(S): at 13:26

## 2024-01-01 RX ADMIN — TIOTROPIUM BROMIDE 2 PUFF(S): 18 CAPSULE ORAL; RESPIRATORY (INHALATION) at 09:32

## 2024-01-01 RX ADMIN — BUDESONIDE AND FORMOTEROL FUMARATE DIHYDRATE 2 PUFF(S): 160; 4.5 AEROSOL RESPIRATORY (INHALATION) at 23:23

## 2024-01-01 RX ADMIN — Medication 1 APPLICATION(S): at 18:19

## 2024-01-01 RX ADMIN — Medication 50 MILLIGRAM(S): at 18:40

## 2024-01-01 RX ADMIN — Medication 20 MILLIGRAM(S): at 17:13

## 2024-01-01 RX ADMIN — HEPARIN SODIUM 8 UNIT(S)/HR: 5000 INJECTION INTRAVENOUS; SUBCUTANEOUS at 19:28

## 2024-01-01 RX ADMIN — HEPARIN SODIUM 800 UNIT(S)/HR: 5000 INJECTION INTRAVENOUS; SUBCUTANEOUS at 15:33

## 2024-01-01 RX ADMIN — Medication 50 MILLIGRAM(S): at 05:55

## 2024-01-01 RX ADMIN — PIPERACILLIN AND TAZOBACTAM 25 GRAM(S): 4; .5 INJECTION, POWDER, LYOPHILIZED, FOR SOLUTION INTRAVENOUS at 05:10

## 2024-01-01 RX ADMIN — ATORVASTATIN CALCIUM 40 MILLIGRAM(S): 80 TABLET, FILM COATED ORAL at 21:08

## 2024-01-01 RX ADMIN — ALBUTEROL 2 PUFF(S): 90 AEROSOL, METERED ORAL at 16:13

## 2024-01-01 RX ADMIN — Medication 50 MILLIGRAM(S): at 06:18

## 2024-01-01 RX ADMIN — Medication 81 MILLIGRAM(S): at 12:47

## 2024-01-01 RX ADMIN — HEPARIN SODIUM 800 UNIT(S)/HR: 5000 INJECTION INTRAVENOUS; SUBCUTANEOUS at 07:51

## 2024-01-01 RX ADMIN — HEPARIN SODIUM 900 UNIT(S)/HR: 5000 INJECTION INTRAVENOUS; SUBCUTANEOUS at 13:28

## 2024-01-01 RX ADMIN — CEFTRIAXONE 100 MILLIGRAM(S): 500 INJECTION, POWDER, FOR SOLUTION INTRAMUSCULAR; INTRAVENOUS at 05:50

## 2024-01-01 RX ADMIN — HEPARIN SODIUM 800 UNIT(S)/HR: 5000 INJECTION INTRAVENOUS; SUBCUTANEOUS at 19:25

## 2024-01-01 RX ADMIN — Medication 81 MILLIGRAM(S): at 12:49

## 2024-01-01 RX ADMIN — Medication 1 APPLICATION(S): at 05:49

## 2024-01-01 RX ADMIN — PANTOPRAZOLE SODIUM 40 MILLIGRAM(S): 20 TABLET, DELAYED RELEASE ORAL at 05:49

## 2024-01-01 RX ADMIN — Medication 6 MILLIGRAM(S): at 05:16

## 2024-01-01 RX ADMIN — Medication 1 APPLICATION(S): at 22:14

## 2024-01-01 RX ADMIN — Medication 400 MILLIGRAM(S): at 09:43

## 2024-01-01 RX ADMIN — HEPARIN SODIUM 800 UNIT(S)/HR: 5000 INJECTION INTRAVENOUS; SUBCUTANEOUS at 19:45

## 2024-01-01 RX ADMIN — Medication 40 MILLIGRAM(S): at 06:09

## 2024-01-01 RX ADMIN — HEPARIN SODIUM 900 UNIT(S)/HR: 5000 INJECTION INTRAVENOUS; SUBCUTANEOUS at 07:50

## 2024-01-01 RX ADMIN — LIDOCAINE 1 PATCH: 4 CREAM TOPICAL at 18:45

## 2024-01-01 RX ADMIN — Medication 3 MILLIGRAM(S): at 21:09

## 2024-01-01 RX ADMIN — TIOTROPIUM BROMIDE 2 PUFF(S): 18 CAPSULE ORAL; RESPIRATORY (INHALATION) at 10:17

## 2024-01-01 RX ADMIN — Medication 50 MILLIGRAM(S): at 05:57

## 2024-01-01 RX ADMIN — BUDESONIDE AND FORMOTEROL FUMARATE DIHYDRATE 2 PUFF(S): 160; 4.5 AEROSOL RESPIRATORY (INHALATION) at 21:13

## 2024-01-01 RX ADMIN — HEPARIN SODIUM 800 UNIT(S)/HR: 5000 INJECTION INTRAVENOUS; SUBCUTANEOUS at 20:55

## 2024-01-01 RX ADMIN — PANTOPRAZOLE SODIUM 40 MILLIGRAM(S): 20 TABLET, DELAYED RELEASE ORAL at 05:43

## 2024-01-01 RX ADMIN — POLYETHYLENE GLYCOL 3350 17 GRAM(S): 17 POWDER, FOR SOLUTION ORAL at 05:02

## 2024-01-01 RX ADMIN — PANTOPRAZOLE SODIUM 40 MILLIGRAM(S): 20 TABLET, DELAYED RELEASE ORAL at 05:51

## 2024-01-01 RX ADMIN — BUDESONIDE AND FORMOTEROL FUMARATE DIHYDRATE 2 PUFF(S): 160; 4.5 AEROSOL RESPIRATORY (INHALATION) at 09:43

## 2024-01-01 RX ADMIN — Medication 50 MILLIGRAM(S): at 05:40

## 2024-01-01 RX ADMIN — PANTOPRAZOLE SODIUM 40 MILLIGRAM(S): 20 TABLET, DELAYED RELEASE ORAL at 06:11

## 2024-01-01 RX ADMIN — ALBUTEROL 2 PUFF(S): 90 AEROSOL, METERED ORAL at 09:12

## 2024-01-01 RX ADMIN — POLYETHYLENE GLYCOL 3350 17 GRAM(S): 17 POWDER, FOR SOLUTION ORAL at 18:20

## 2024-01-01 RX ADMIN — Medication 81 MILLIGRAM(S): at 11:15

## 2024-01-01 RX ADMIN — POLYETHYLENE GLYCOL 3350 17 GRAM(S): 17 POWDER, FOR SOLUTION ORAL at 05:48

## 2024-01-01 RX ADMIN — SENNA PLUS 2 TABLET(S): 8.6 TABLET ORAL at 22:29

## 2024-01-01 RX ADMIN — BUDESONIDE AND FORMOTEROL FUMARATE DIHYDRATE 2 PUFF(S): 160; 4.5 AEROSOL RESPIRATORY (INHALATION) at 09:29

## 2024-01-01 RX ADMIN — HEPARIN SODIUM 800 UNIT(S)/HR: 5000 INJECTION INTRAVENOUS; SUBCUTANEOUS at 06:32

## 2024-01-01 RX ADMIN — Medication 1 APPLICATION(S): at 17:52

## 2024-01-01 RX ADMIN — Medication 1 APPLICATION(S): at 13:15

## 2024-01-01 RX ADMIN — HEPARIN SODIUM 8.5 UNIT(S)/HR: 5000 INJECTION INTRAVENOUS; SUBCUTANEOUS at 07:37

## 2024-01-01 RX ADMIN — PIPERACILLIN AND TAZOBACTAM 25 GRAM(S): 4; .5 INJECTION, POWDER, LYOPHILIZED, FOR SOLUTION INTRAVENOUS at 05:38

## 2024-01-01 RX ADMIN — HEPARIN SODIUM 8 UNIT(S)/HR: 5000 INJECTION INTRAVENOUS; SUBCUTANEOUS at 07:42

## 2024-01-01 RX ADMIN — POLYETHYLENE GLYCOL 3350 17 GRAM(S): 17 POWDER, FOR SOLUTION ORAL at 05:18

## 2024-01-01 RX ADMIN — Medication 6 MILLIGRAM(S): at 05:37

## 2024-01-01 RX ADMIN — Medication 40 MILLIGRAM(S): at 05:43

## 2024-01-01 RX ADMIN — Medication 100 MILLIGRAM(S): at 11:25

## 2024-01-01 RX ADMIN — Medication 100 MILLIGRAM(S): at 13:05

## 2024-01-01 RX ADMIN — Medication 1 APPLICATION(S): at 18:36

## 2024-01-01 RX ADMIN — Medication 100 MILLIGRAM(S): at 11:40

## 2024-01-01 RX ADMIN — PANTOPRAZOLE SODIUM 40 MILLIGRAM(S): 20 TABLET, DELAYED RELEASE ORAL at 05:57

## 2024-01-01 RX ADMIN — BUDESONIDE AND FORMOTEROL FUMARATE DIHYDRATE 2 PUFF(S): 160; 4.5 AEROSOL RESPIRATORY (INHALATION) at 22:05

## 2024-01-01 RX ADMIN — Medication 1 PUFF(S): at 18:07

## 2024-01-01 RX ADMIN — Medication 100 MILLIGRAM(S): at 00:58

## 2024-01-01 RX ADMIN — SENNA PLUS 2 TABLET(S): 8.6 TABLET ORAL at 22:05

## 2024-01-01 RX ADMIN — ALBUTEROL 2 PUFF(S): 90 AEROSOL, METERED ORAL at 05:15

## 2024-01-01 RX ADMIN — POLYETHYLENE GLYCOL 3350 17 GRAM(S): 17 POWDER, FOR SOLUTION ORAL at 07:01

## 2024-01-01 RX ADMIN — ATORVASTATIN CALCIUM 40 MILLIGRAM(S): 80 TABLET, FILM COATED ORAL at 21:16

## 2024-01-01 RX ADMIN — Medication 1 APPLICATION(S): at 06:10

## 2024-01-01 RX ADMIN — Medication 40 MILLIEQUIVALENT(S): at 11:07

## 2024-01-01 RX ADMIN — Medication 40 MILLIEQUIVALENT(S): at 14:23

## 2024-01-01 RX ADMIN — Medication 400 MILLIGRAM(S): at 02:57

## 2024-01-01 RX ADMIN — REMDESIVIR 200 MILLIGRAM(S): 5 INJECTION INTRAVENOUS at 14:12

## 2024-01-01 RX ADMIN — PIPERACILLIN AND TAZOBACTAM 25 GRAM(S): 4; .5 INJECTION, POWDER, LYOPHILIZED, FOR SOLUTION INTRAVENOUS at 18:45

## 2024-01-01 RX ADMIN — ALBUTEROL 2 PUFF(S): 90 AEROSOL, METERED ORAL at 21:13

## 2024-01-01 RX ADMIN — PANTOPRAZOLE SODIUM 40 MILLIGRAM(S): 20 TABLET, DELAYED RELEASE ORAL at 05:33

## 2024-01-01 RX ADMIN — Medication 3 MILLIGRAM(S): at 22:16

## 2024-01-01 RX ADMIN — ALBUTEROL 2 PUFF(S): 90 AEROSOL, METERED ORAL at 21:01

## 2024-01-01 RX ADMIN — ALBUTEROL 2 PUFF(S): 90 AEROSOL, METERED ORAL at 18:45

## 2024-01-01 RX ADMIN — Medication 81 MILLIGRAM(S): at 16:22

## 2024-01-01 RX ADMIN — HEPARIN SODIUM 800 UNIT(S)/HR: 5000 INJECTION INTRAVENOUS; SUBCUTANEOUS at 19:28

## 2024-01-01 RX ADMIN — Medication 1 APPLICATION(S): at 21:16

## 2024-01-01 RX ADMIN — POLYETHYLENE GLYCOL 3350 17 GRAM(S): 17 POWDER, FOR SOLUTION ORAL at 18:44

## 2024-01-01 RX ADMIN — Medication 6 MILLIGRAM(S): at 07:02

## 2024-01-01 RX ADMIN — HEPARIN SODIUM 1100 UNIT(S)/HR: 5000 INJECTION INTRAVENOUS; SUBCUTANEOUS at 18:19

## 2024-01-01 RX ADMIN — HEPARIN SODIUM 8 UNIT(S)/HR: 5000 INJECTION INTRAVENOUS; SUBCUTANEOUS at 13:19

## 2024-01-01 RX ADMIN — POLYETHYLENE GLYCOL 3350 17 GRAM(S): 17 POWDER, FOR SOLUTION ORAL at 05:17

## 2024-01-01 RX ADMIN — POLYETHYLENE GLYCOL 3350 17 GRAM(S): 17 POWDER, FOR SOLUTION ORAL at 05:37

## 2024-01-01 RX ADMIN — Medication 81 MILLIGRAM(S): at 11:40

## 2024-01-01 RX ADMIN — Medication 50 MILLIGRAM(S): at 05:13

## 2024-01-01 RX ADMIN — Medication 100 MILLIGRAM(S): at 12:50

## 2024-01-01 RX ADMIN — HEPARIN SODIUM 800 UNIT(S)/HR: 5000 INJECTION INTRAVENOUS; SUBCUTANEOUS at 01:32

## 2024-01-01 RX ADMIN — ATORVASTATIN CALCIUM 40 MILLIGRAM(S): 80 TABLET, FILM COATED ORAL at 21:29

## 2024-01-01 RX ADMIN — HEPARIN SODIUM 8 UNIT(S)/HR: 5000 INJECTION INTRAVENOUS; SUBCUTANEOUS at 16:14

## 2024-01-01 RX ADMIN — SENNA PLUS 2 TABLET(S): 8.6 TABLET ORAL at 21:28

## 2024-01-01 RX ADMIN — ATORVASTATIN CALCIUM 40 MILLIGRAM(S): 80 TABLET, FILM COATED ORAL at 22:06

## 2024-01-01 RX ADMIN — APIXABAN 2.5 MILLIGRAM(S): 2.5 TABLET, FILM COATED ORAL at 18:06

## 2024-01-01 RX ADMIN — Medication 1 APPLICATION(S): at 05:27

## 2024-01-01 RX ADMIN — Medication 81 MILLIGRAM(S): at 12:04

## 2024-01-01 RX ADMIN — BUDESONIDE AND FORMOTEROL FUMARATE DIHYDRATE 2 PUFF(S): 160; 4.5 AEROSOL RESPIRATORY (INHALATION) at 21:37

## 2024-01-01 RX ADMIN — BUDESONIDE AND FORMOTEROL FUMARATE DIHYDRATE 2 PUFF(S): 160; 4.5 AEROSOL RESPIRATORY (INHALATION) at 09:32

## 2024-01-01 RX ADMIN — HEPARIN SODIUM 900 UNIT(S)/HR: 5000 INJECTION INTRAVENOUS; SUBCUTANEOUS at 07:59

## 2024-01-01 RX ADMIN — Medication 100 MILLIGRAM(S): at 12:49

## 2024-01-01 RX ADMIN — Medication 3 MILLIGRAM(S): at 02:14

## 2024-01-01 RX ADMIN — Medication 100 MILLIGRAM(S): at 11:14

## 2024-01-01 RX ADMIN — PANTOPRAZOLE SODIUM 40 MILLIGRAM(S): 20 TABLET, DELAYED RELEASE ORAL at 05:40

## 2024-01-01 RX ADMIN — ATORVASTATIN CALCIUM 40 MILLIGRAM(S): 80 TABLET, FILM COATED ORAL at 22:14

## 2024-01-01 RX ADMIN — Medication 3 MILLILITER(S): at 06:38

## 2024-01-01 RX ADMIN — Medication 50 MILLIGRAM(S): at 05:48

## 2024-01-01 RX ADMIN — HEPARIN SODIUM 1100 UNIT(S)/HR: 5000 INJECTION INTRAVENOUS; SUBCUTANEOUS at 08:53

## 2024-01-01 RX ADMIN — Medication 100 MILLIGRAM(S): at 16:21

## 2024-01-01 RX ADMIN — Medication 1 APPLICATION(S): at 21:50

## 2024-01-01 RX ADMIN — Medication 50 MILLIGRAM(S): at 05:34

## 2024-01-01 RX ADMIN — HEPARIN SODIUM 800 UNIT(S)/HR: 5000 INJECTION INTRAVENOUS; SUBCUTANEOUS at 07:50

## 2024-01-01 RX ADMIN — Medication 1 APPLICATION(S): at 15:22

## 2024-01-01 RX ADMIN — PIPERACILLIN AND TAZOBACTAM 25 GRAM(S): 4; .5 INJECTION, POWDER, LYOPHILIZED, FOR SOLUTION INTRAVENOUS at 05:03

## 2024-01-01 RX ADMIN — HEPARIN SODIUM 8 UNIT(S)/HR: 5000 INJECTION INTRAVENOUS; SUBCUTANEOUS at 14:01

## 2024-01-01 RX ADMIN — HEPARIN SODIUM 1200 UNIT(S)/HR: 5000 INJECTION INTRAVENOUS; SUBCUTANEOUS at 01:28

## 2024-01-01 RX ADMIN — PANTOPRAZOLE SODIUM 40 MILLIGRAM(S): 20 TABLET, DELAYED RELEASE ORAL at 05:50

## 2024-01-01 RX ADMIN — Medication 650 MILLIGRAM(S): at 18:49

## 2024-01-01 RX ADMIN — ALBUTEROL 2 PUFF(S): 90 AEROSOL, METERED ORAL at 04:53

## 2024-01-01 RX ADMIN — HEPARIN SODIUM 1200 UNIT(S)/HR: 5000 INJECTION INTRAVENOUS; SUBCUTANEOUS at 19:26

## 2024-01-01 RX ADMIN — SODIUM CHLORIDE 100 MILLILITER(S): 9 INJECTION, SOLUTION INTRAVENOUS at 16:29

## 2024-01-01 RX ADMIN — BUDESONIDE AND FORMOTEROL FUMARATE DIHYDRATE 2 PUFF(S): 160; 4.5 AEROSOL RESPIRATORY (INHALATION) at 21:34

## 2024-01-01 RX ADMIN — Medication 81 MILLIGRAM(S): at 11:25

## 2024-01-01 RX ADMIN — Medication 40 MILLIGRAM(S): at 05:26

## 2024-01-01 RX ADMIN — SENNA PLUS 2 TABLET(S): 8.6 TABLET ORAL at 23:17

## 2024-01-01 RX ADMIN — BUDESONIDE AND FORMOTEROL FUMARATE DIHYDRATE 2 PUFF(S): 160; 4.5 AEROSOL RESPIRATORY (INHALATION) at 21:02

## 2024-01-01 RX ADMIN — PANTOPRAZOLE SODIUM 40 MILLIGRAM(S): 20 TABLET, DELAYED RELEASE ORAL at 05:02

## 2024-01-01 RX ADMIN — Medication 81 MILLIGRAM(S): at 11:58

## 2024-01-01 RX ADMIN — HYDROMORPHONE HYDROCHLORIDE 0.2 MILLIGRAM(S): 2 INJECTION INTRAMUSCULAR; INTRAVENOUS; SUBCUTANEOUS at 11:53

## 2024-01-01 RX ADMIN — Medication 100 MILLIGRAM(S): at 12:28

## 2024-01-01 RX ADMIN — HEPARIN SODIUM 800 UNIT(S)/HR: 5000 INJECTION INTRAVENOUS; SUBCUTANEOUS at 19:47

## 2024-01-01 RX ADMIN — Medication 100 MILLIGRAM(S): at 13:15

## 2024-01-01 RX ADMIN — APIXABAN 2.5 MILLIGRAM(S): 2.5 TABLET, FILM COATED ORAL at 05:57

## 2024-01-01 RX ADMIN — Medication 1 APPLICATION(S): at 05:33

## 2024-01-01 RX ADMIN — SENNA PLUS 2 TABLET(S): 8.6 TABLET ORAL at 21:14

## 2024-01-01 RX ADMIN — Medication 81 MILLIGRAM(S): at 12:59

## 2024-01-01 RX ADMIN — ATORVASTATIN CALCIUM 40 MILLIGRAM(S): 80 TABLET, FILM COATED ORAL at 21:10

## 2024-01-01 RX ADMIN — BUDESONIDE AND FORMOTEROL FUMARATE DIHYDRATE 2 PUFF(S): 160; 4.5 AEROSOL RESPIRATORY (INHALATION) at 08:55

## 2024-01-01 RX ADMIN — Medication 100 MILLIGRAM(S): at 11:38

## 2024-01-01 RX ADMIN — ATORVASTATIN CALCIUM 40 MILLIGRAM(S): 80 TABLET, FILM COATED ORAL at 21:35

## 2024-01-01 RX ADMIN — BUDESONIDE AND FORMOTEROL FUMARATE DIHYDRATE 2 PUFF(S): 160; 4.5 AEROSOL RESPIRATORY (INHALATION) at 21:52

## 2024-01-01 RX ADMIN — HEPARIN SODIUM 900 UNIT(S)/HR: 5000 INJECTION INTRAVENOUS; SUBCUTANEOUS at 07:52

## 2024-01-01 RX ADMIN — REMDESIVIR 200 MILLIGRAM(S): 5 INJECTION INTRAVENOUS at 13:52

## 2024-01-01 RX ADMIN — HEPARIN SODIUM 800 UNIT(S)/HR: 5000 INJECTION INTRAVENOUS; SUBCUTANEOUS at 07:26

## 2024-01-01 RX ADMIN — Medication 3 MILLIGRAM(S): at 23:04

## 2024-01-01 RX ADMIN — POLYETHYLENE GLYCOL 3350 17 GRAM(S): 17 POWDER, FOR SOLUTION ORAL at 05:54

## 2024-01-01 RX ADMIN — HEPARIN SODIUM 1200 UNIT(S)/HR: 5000 INJECTION INTRAVENOUS; SUBCUTANEOUS at 07:34

## 2024-01-01 RX ADMIN — CEFTRIAXONE 100 MILLIGRAM(S): 500 INJECTION, POWDER, FOR SOLUTION INTRAMUSCULAR; INTRAVENOUS at 12:20

## 2024-01-01 RX ADMIN — HEPARIN SODIUM 900 UNIT(S)/HR: 5000 INJECTION INTRAVENOUS; SUBCUTANEOUS at 04:47

## 2024-01-01 RX ADMIN — TIOTROPIUM BROMIDE 2 PUFF(S): 18 CAPSULE ORAL; RESPIRATORY (INHALATION) at 09:11

## 2024-01-01 RX ADMIN — Medication 40 MILLIGRAM(S): at 05:55

## 2024-01-01 RX ADMIN — Medication 50 MILLIGRAM(S): at 05:38

## 2024-01-01 RX ADMIN — Medication 1 APPLICATION(S): at 06:02

## 2024-01-01 RX ADMIN — ATORVASTATIN CALCIUM 40 MILLIGRAM(S): 80 TABLET, FILM COATED ORAL at 21:38

## 2024-01-01 RX ADMIN — BUDESONIDE AND FORMOTEROL FUMARATE DIHYDRATE 2 PUFF(S): 160; 4.5 AEROSOL RESPIRATORY (INHALATION) at 22:39

## 2024-01-01 RX ADMIN — ATORVASTATIN CALCIUM 40 MILLIGRAM(S): 80 TABLET, FILM COATED ORAL at 22:15

## 2024-01-01 RX ADMIN — BUDESONIDE AND FORMOTEROL FUMARATE DIHYDRATE 2 PUFF(S): 160; 4.5 AEROSOL RESPIRATORY (INHALATION) at 09:39

## 2024-01-01 RX ADMIN — Medication 1 APPLICATION(S): at 05:54

## 2024-01-01 RX ADMIN — Medication 100 MILLIGRAM(S): at 13:46

## 2024-01-01 RX ADMIN — PANTOPRAZOLE SODIUM 40 MILLIGRAM(S): 20 TABLET, DELAYED RELEASE ORAL at 06:10

## 2024-01-01 RX ADMIN — Medication 1 APPLICATION(S): at 06:18

## 2024-01-01 RX ADMIN — Medication 100 MILLIGRAM(S): at 18:51

## 2024-01-01 RX ADMIN — POLYETHYLENE GLYCOL 3350 17 GRAM(S): 17 POWDER, FOR SOLUTION ORAL at 06:10

## 2024-01-01 RX ADMIN — Medication 50 MILLIGRAM(S): at 05:11

## 2024-01-01 RX ADMIN — ATORVASTATIN CALCIUM 40 MILLIGRAM(S): 80 TABLET, FILM COATED ORAL at 21:00

## 2024-01-01 RX ADMIN — Medication 1 APPLICATION(S): at 21:38

## 2024-01-01 RX ADMIN — ALBUTEROL 2 PUFF(S): 90 AEROSOL, METERED ORAL at 21:33

## 2024-01-01 RX ADMIN — QUETIAPINE FUMARATE 12.5 MILLIGRAM(S): 200 TABLET, FILM COATED ORAL at 16:35

## 2024-01-01 RX ADMIN — Medication 1 APPLICATION(S): at 12:48

## 2024-01-01 RX ADMIN — POLYETHYLENE GLYCOL 3350 17 GRAM(S): 17 POWDER, FOR SOLUTION ORAL at 05:12

## 2024-01-01 RX ADMIN — Medication 1 APPLICATION(S): at 21:34

## 2024-01-01 RX ADMIN — Medication 1000 MILLIGRAM(S): at 03:00

## 2024-01-01 RX ADMIN — Medication 1 APPLICATION(S): at 17:54

## 2024-01-01 RX ADMIN — Medication 81 MILLIGRAM(S): at 11:37

## 2024-01-01 RX ADMIN — POLYETHYLENE GLYCOL 3350 17 GRAM(S): 17 POWDER, FOR SOLUTION ORAL at 18:40

## 2024-01-01 RX ADMIN — Medication 40 MILLIGRAM(S): at 18:40

## 2024-01-01 RX ADMIN — PIPERACILLIN AND TAZOBACTAM 25 GRAM(S): 4; .5 INJECTION, POWDER, LYOPHILIZED, FOR SOLUTION INTRAVENOUS at 18:07

## 2024-01-01 RX ADMIN — Medication 50 MILLIGRAM(S): at 06:11

## 2024-01-01 RX ADMIN — HYDROMORPHONE HYDROCHLORIDE 0.2 MILLIGRAM(S): 2 INJECTION INTRAMUSCULAR; INTRAVENOUS; SUBCUTANEOUS at 17:44

## 2024-01-01 RX ADMIN — REMDESIVIR 200 MILLIGRAM(S): 5 INJECTION INTRAVENOUS at 13:20

## 2024-01-01 RX ADMIN — HEPARIN SODIUM 900 UNIT(S)/HR: 5000 INJECTION INTRAVENOUS; SUBCUTANEOUS at 07:28

## 2024-01-01 RX ADMIN — BUDESONIDE AND FORMOTEROL FUMARATE DIHYDRATE 2 PUFF(S): 160; 4.5 AEROSOL RESPIRATORY (INHALATION) at 21:06

## 2024-01-01 RX ADMIN — Medication 40 MILLIGRAM(S): at 05:11

## 2024-01-01 RX ADMIN — HEPARIN SODIUM 800 UNIT(S)/HR: 5000 INJECTION INTRAVENOUS; SUBCUTANEOUS at 22:03

## 2024-01-01 RX ADMIN — Medication 100 MILLIGRAM(S): at 20:57

## 2024-01-01 RX ADMIN — BUDESONIDE AND FORMOTEROL FUMARATE DIHYDRATE 2 PUFF(S): 160; 4.5 AEROSOL RESPIRATORY (INHALATION) at 10:18

## 2024-01-01 RX ADMIN — HEPARIN SODIUM 1000 UNIT(S)/HR: 5000 INJECTION INTRAVENOUS; SUBCUTANEOUS at 07:27

## 2024-01-01 RX ADMIN — HEPARIN SODIUM 800 UNIT(S)/HR: 5000 INJECTION INTRAVENOUS; SUBCUTANEOUS at 19:13

## 2024-01-01 RX ADMIN — HEPARIN SODIUM 800 UNIT(S)/HR: 5000 INJECTION INTRAVENOUS; SUBCUTANEOUS at 07:36

## 2024-01-01 RX ADMIN — HEPARIN SODIUM 800 UNIT(S)/HR: 5000 INJECTION INTRAVENOUS; SUBCUTANEOUS at 07:49

## 2024-01-01 RX ADMIN — ATORVASTATIN CALCIUM 40 MILLIGRAM(S): 80 TABLET, FILM COATED ORAL at 21:28

## 2024-01-01 RX ADMIN — HEPARIN SODIUM 800 UNIT(S)/HR: 5000 INJECTION INTRAVENOUS; SUBCUTANEOUS at 07:33

## 2024-01-01 RX ADMIN — TIOTROPIUM BROMIDE 2 PUFF(S): 18 CAPSULE ORAL; RESPIRATORY (INHALATION) at 09:35

## 2024-01-01 RX ADMIN — HALOPERIDOL DECANOATE 5 MILLIGRAM(S): 100 INJECTION INTRAMUSCULAR at 01:59

## 2024-01-01 RX ADMIN — Medication 3 MILLIGRAM(S): at 23:46

## 2024-01-01 RX ADMIN — Medication 81 MILLIGRAM(S): at 13:05

## 2024-01-01 RX ADMIN — Medication 650 MILLIGRAM(S): at 13:44

## 2024-01-01 RX ADMIN — BUDESONIDE AND FORMOTEROL FUMARATE DIHYDRATE 2 PUFF(S): 160; 4.5 AEROSOL RESPIRATORY (INHALATION) at 09:11

## 2024-01-01 RX ADMIN — ATORVASTATIN CALCIUM 40 MILLIGRAM(S): 80 TABLET, FILM COATED ORAL at 22:30

## 2024-01-01 RX ADMIN — Medication 1 PUFF(S): at 09:03

## 2024-01-01 RX ADMIN — ALBUTEROL 2 PUFF(S): 90 AEROSOL, METERED ORAL at 04:41

## 2024-01-01 RX ADMIN — Medication 6 MILLIGRAM(S): at 05:03

## 2024-01-01 RX ADMIN — TIOTROPIUM BROMIDE 2 PUFF(S): 18 CAPSULE ORAL; RESPIRATORY (INHALATION) at 09:45

## 2024-01-01 RX ADMIN — HEPARIN SODIUM 800 UNIT(S)/HR: 5000 INJECTION INTRAVENOUS; SUBCUTANEOUS at 19:27

## 2024-01-01 RX ADMIN — Medication 81 MILLIGRAM(S): at 13:54

## 2024-01-01 RX ADMIN — PANTOPRAZOLE SODIUM 40 MILLIGRAM(S): 20 TABLET, DELAYED RELEASE ORAL at 05:19

## 2024-01-01 RX ADMIN — Medication 100 MILLIGRAM(S): at 06:10

## 2024-01-01 RX ADMIN — Medication 1 APPLICATION(S): at 06:12

## 2024-01-01 RX ADMIN — PANTOPRAZOLE SODIUM 40 MILLIGRAM(S): 20 TABLET, DELAYED RELEASE ORAL at 06:18

## 2024-01-01 RX ADMIN — APIXABAN 2.5 MILLIGRAM(S): 2.5 TABLET, FILM COATED ORAL at 06:10

## 2024-01-01 RX ADMIN — Medication 100 MILLIGRAM(S): at 05:18

## 2024-01-01 RX ADMIN — ALBUTEROL 2 PUFF(S): 90 AEROSOL, METERED ORAL at 09:02

## 2024-01-01 RX ADMIN — Medication 1000 MILLIGRAM(S): at 10:43

## 2024-01-01 RX ADMIN — Medication 100 MILLIGRAM(S): at 05:30

## 2024-01-01 RX ADMIN — ATORVASTATIN CALCIUM 40 MILLIGRAM(S): 80 TABLET, FILM COATED ORAL at 21:13

## 2024-01-01 RX ADMIN — Medication 81 MILLIGRAM(S): at 13:30

## 2024-01-01 RX ADMIN — HEPARIN SODIUM 800 UNIT(S)/HR: 5000 INJECTION INTRAVENOUS; SUBCUTANEOUS at 05:54

## 2024-01-01 RX ADMIN — POLYETHYLENE GLYCOL 3350 17 GRAM(S): 17 POWDER, FOR SOLUTION ORAL at 05:33

## 2024-01-01 RX ADMIN — APIXABAN 2.5 MILLIGRAM(S): 2.5 TABLET, FILM COATED ORAL at 05:11

## 2024-01-01 RX ADMIN — POLYETHYLENE GLYCOL 3350 17 GRAM(S): 17 POWDER, FOR SOLUTION ORAL at 18:06

## 2024-01-01 RX ADMIN — Medication 1 SPRAY(S): at 18:08

## 2024-01-01 RX ADMIN — Medication 100 MILLIGRAM(S): at 13:16

## 2024-01-01 RX ADMIN — HEPARIN SODIUM 800 UNIT(S)/HR: 5000 INJECTION INTRAVENOUS; SUBCUTANEOUS at 15:36

## 2024-01-01 RX ADMIN — Medication 1 APPLICATION(S): at 13:05

## 2024-01-01 RX ADMIN — Medication 1 SPRAY(S): at 05:13

## 2024-01-01 RX ADMIN — POLYETHYLENE GLYCOL 3350 17 GRAM(S): 17 POWDER, FOR SOLUTION ORAL at 05:27

## 2024-01-01 RX ADMIN — Medication 40 MILLIGRAM(S): at 05:29

## 2024-01-01 RX ADMIN — Medication 100 MILLIGRAM(S): at 13:02

## 2024-01-01 RX ADMIN — Medication 100 MILLIGRAM(S): at 00:31

## 2024-01-01 RX ADMIN — HEPARIN SODIUM 7 UNIT(S)/HR: 5000 INJECTION INTRAVENOUS; SUBCUTANEOUS at 08:04

## 2024-01-01 RX ADMIN — Medication 100 MILLIGRAM(S): at 13:44

## 2024-01-01 RX ADMIN — HEPARIN SODIUM 8.5 UNIT(S)/HR: 5000 INJECTION INTRAVENOUS; SUBCUTANEOUS at 21:24

## 2024-01-01 RX ADMIN — Medication 1 APPLICATION(S): at 18:05

## 2024-01-01 RX ADMIN — HEPARIN SODIUM 800 UNIT(S)/HR: 5000 INJECTION INTRAVENOUS; SUBCUTANEOUS at 07:00

## 2024-01-01 RX ADMIN — Medication 40 MILLIGRAM(S): at 09:29

## 2024-01-01 RX ADMIN — Medication 1 APPLICATION(S): at 13:31

## 2024-01-01 RX ADMIN — CEFTRIAXONE 100 MILLIGRAM(S): 500 INJECTION, POWDER, FOR SOLUTION INTRAMUSCULAR; INTRAVENOUS at 14:03

## 2024-01-01 RX ADMIN — LIDOCAINE 1 PATCH: 4 CREAM TOPICAL at 14:57

## 2024-01-01 RX ADMIN — Medication 40 MILLIGRAM(S): at 06:18

## 2024-01-01 RX ADMIN — Medication 100 MILLIGRAM(S): at 12:01

## 2024-01-01 RX ADMIN — ATORVASTATIN CALCIUM 40 MILLIGRAM(S): 80 TABLET, FILM COATED ORAL at 21:36

## 2024-01-01 RX ADMIN — CEFTRIAXONE 100 MILLIGRAM(S): 500 INJECTION, POWDER, FOR SOLUTION INTRAMUSCULAR; INTRAVENOUS at 13:32

## 2024-01-01 RX ADMIN — Medication 50 MILLIGRAM(S): at 05:43

## 2024-01-01 RX ADMIN — Medication 50 MILLIGRAM(S): at 05:49

## 2024-01-01 RX ADMIN — Medication 1 PUFF(S): at 21:33

## 2024-01-01 RX ADMIN — Medication 1 SPRAY(S): at 18:24

## 2024-01-01 RX ADMIN — Medication 20 MILLIGRAM(S): at 02:14

## 2024-01-01 RX ADMIN — ATORVASTATIN CALCIUM 40 MILLIGRAM(S): 80 TABLET, FILM COATED ORAL at 21:48

## 2024-01-01 RX ADMIN — HEPARIN SODIUM 900 UNIT(S)/HR: 5000 INJECTION INTRAVENOUS; SUBCUTANEOUS at 09:52

## 2024-01-01 RX ADMIN — ALBUTEROL 2 PUFF(S): 90 AEROSOL, METERED ORAL at 22:30

## 2024-01-01 RX ADMIN — Medication 1 APPLICATION(S): at 06:11

## 2024-01-01 RX ADMIN — ATORVASTATIN CALCIUM 40 MILLIGRAM(S): 80 TABLET, FILM COATED ORAL at 21:01

## 2024-01-01 RX ADMIN — Medication 3 MILLIGRAM(S): at 21:55

## 2024-01-01 RX ADMIN — ALBUTEROL 2 PUFF(S): 90 AEROSOL, METERED ORAL at 13:18

## 2024-01-01 RX ADMIN — Medication 100 MILLIGRAM(S): at 12:48

## 2024-01-01 RX ADMIN — POLYETHYLENE GLYCOL 3350 17 GRAM(S): 17 POWDER, FOR SOLUTION ORAL at 17:17

## 2024-01-01 RX ADMIN — POLYETHYLENE GLYCOL 3350 17 GRAM(S): 17 POWDER, FOR SOLUTION ORAL at 06:22

## 2024-01-01 RX ADMIN — HEPARIN SODIUM 800 UNIT(S)/HR: 5000 INJECTION INTRAVENOUS; SUBCUTANEOUS at 23:51

## 2024-01-01 RX ADMIN — Medication 1 APPLICATION(S): at 21:29

## 2024-01-01 RX ADMIN — Medication 1 PUFF(S): at 04:53

## 2024-01-01 RX ADMIN — HEPARIN SODIUM 800 UNIT(S)/HR: 5000 INJECTION INTRAVENOUS; SUBCUTANEOUS at 07:46

## 2024-01-01 RX ADMIN — APIXABAN 2.5 MILLIGRAM(S): 2.5 TABLET, FILM COATED ORAL at 05:54

## 2024-01-01 RX ADMIN — BUDESONIDE AND FORMOTEROL FUMARATE DIHYDRATE 2 PUFF(S): 160; 4.5 AEROSOL RESPIRATORY (INHALATION) at 21:22

## 2024-01-01 RX ADMIN — Medication 1 APPLICATION(S): at 06:23

## 2024-01-01 RX ADMIN — DAPAGLIFLOZIN 10 MILLIGRAM(S): 10 TABLET, FILM COATED ORAL at 12:49

## 2024-01-01 RX ADMIN — SODIUM CHLORIDE 75 MILLILITER(S): 9 INJECTION INTRAMUSCULAR; INTRAVENOUS; SUBCUTANEOUS at 18:35

## 2024-01-01 RX ADMIN — ATORVASTATIN CALCIUM 40 MILLIGRAM(S): 80 TABLET, FILM COATED ORAL at 21:50

## 2024-01-01 RX ADMIN — TIOTROPIUM BROMIDE 2 PUFF(S): 18 CAPSULE ORAL; RESPIRATORY (INHALATION) at 10:38

## 2024-01-01 RX ADMIN — Medication 650 MILLIGRAM(S): at 16:41

## 2024-01-01 RX ADMIN — POLYETHYLENE GLYCOL 3350 17 GRAM(S): 17 POWDER, FOR SOLUTION ORAL at 05:57

## 2024-01-01 RX ADMIN — POLYETHYLENE GLYCOL 3350 17 GRAM(S): 17 POWDER, FOR SOLUTION ORAL at 17:53

## 2024-01-01 RX ADMIN — DAPAGLIFLOZIN 10 MILLIGRAM(S): 10 TABLET, FILM COATED ORAL at 05:11

## 2024-01-01 RX ADMIN — Medication 3 MILLIGRAM(S): at 21:29

## 2024-01-01 RX ADMIN — Medication 20 MILLIEQUIVALENT(S): at 18:32

## 2024-01-01 RX ADMIN — Medication 100 MILLIGRAM(S): at 05:38

## 2024-01-01 RX ADMIN — HEPARIN SODIUM 1200 UNIT(S)/HR: 5000 INJECTION INTRAVENOUS; SUBCUTANEOUS at 18:31

## 2024-01-01 RX ADMIN — Medication 1 APPLICATION(S): at 13:45

## 2024-01-01 RX ADMIN — TIOTROPIUM BROMIDE 2 PUFF(S): 18 CAPSULE ORAL; RESPIRATORY (INHALATION) at 09:39

## 2024-01-01 RX ADMIN — Medication 50 MILLIGRAM(S): at 05:19

## 2024-01-01 RX ADMIN — SENNA PLUS 2 TABLET(S): 8.6 TABLET ORAL at 21:18

## 2024-01-01 RX ADMIN — Medication 50 MILLIGRAM(S): at 05:27

## 2024-01-01 RX ADMIN — HEPARIN SODIUM 1100 UNIT(S)/HR: 5000 INJECTION INTRAVENOUS; SUBCUTANEOUS at 16:30

## 2024-01-01 RX ADMIN — Medication 40 MILLIGRAM(S): at 16:45

## 2024-01-01 RX ADMIN — PANTOPRAZOLE SODIUM 40 MILLIGRAM(S): 20 TABLET, DELAYED RELEASE ORAL at 05:13

## 2024-01-01 RX ADMIN — HEPARIN SODIUM 800 UNIT(S)/HR: 5000 INJECTION INTRAVENOUS; SUBCUTANEOUS at 19:56

## 2024-01-01 RX ADMIN — Medication 40 MILLIGRAM(S): at 09:44

## 2024-01-01 RX ADMIN — HEPARIN SODIUM 5000 UNIT(S): 5000 INJECTION INTRAVENOUS; SUBCUTANEOUS at 05:44

## 2024-01-01 RX ADMIN — Medication 100 MILLIGRAM(S): at 23:37

## 2024-01-01 RX ADMIN — SENNA PLUS 2 TABLET(S): 8.6 TABLET ORAL at 21:37

## 2024-01-01 RX ADMIN — Medication 1 APPLICATION(S): at 13:28

## 2024-01-01 RX ADMIN — HEPARIN SODIUM 1100 UNIT(S)/HR: 5000 INJECTION INTRAVENOUS; SUBCUTANEOUS at 19:31

## 2024-01-01 RX ADMIN — APIXABAN 2.5 MILLIGRAM(S): 2.5 TABLET, FILM COATED ORAL at 21:35

## 2024-01-01 RX ADMIN — BUDESONIDE AND FORMOTEROL FUMARATE DIHYDRATE 2 PUFF(S): 160; 4.5 AEROSOL RESPIRATORY (INHALATION) at 10:38

## 2024-01-01 RX ADMIN — TIOTROPIUM BROMIDE 2 PUFF(S): 18 CAPSULE ORAL; RESPIRATORY (INHALATION) at 09:12

## 2024-01-01 RX ADMIN — Medication 1 APPLICATION(S): at 14:24

## 2024-01-01 RX ADMIN — Medication 1 APPLICATION(S): at 23:27

## 2024-01-01 RX ADMIN — Medication 40 MILLIGRAM(S): at 05:06

## 2024-01-01 RX ADMIN — HEPARIN SODIUM 5000 UNIT(S): 5000 INJECTION INTRAVENOUS; SUBCUTANEOUS at 18:47

## 2024-01-01 RX ADMIN — Medication 50 MILLIGRAM(S): at 05:18

## 2024-01-01 RX ADMIN — Medication 40 MILLIGRAM(S): at 05:32

## 2024-01-01 RX ADMIN — Medication 81 MILLIGRAM(S): at 12:23

## 2024-01-01 RX ADMIN — INSULIN HUMAN 5 UNIT(S): 100 INJECTION, SOLUTION SUBCUTANEOUS at 05:09

## 2024-01-01 RX ADMIN — PIPERACILLIN AND TAZOBACTAM 25 GRAM(S): 4; .5 INJECTION, POWDER, LYOPHILIZED, FOR SOLUTION INTRAVENOUS at 17:38

## 2024-01-01 RX ADMIN — HEPARIN SODIUM 1000 UNIT(S)/HR: 5000 INJECTION INTRAVENOUS; SUBCUTANEOUS at 04:56

## 2024-01-01 RX ADMIN — Medication 81 MILLIGRAM(S): at 13:15

## 2024-01-01 RX ADMIN — HEPARIN SODIUM 800 UNIT(S)/HR: 5000 INJECTION INTRAVENOUS; SUBCUTANEOUS at 18:16

## 2024-01-01 RX ADMIN — ALBUTEROL 2 PUFF(S): 90 AEROSOL, METERED ORAL at 09:45

## 2024-01-01 RX ADMIN — Medication 1 SPRAY(S): at 05:03

## 2024-01-01 RX ADMIN — HEPARIN SODIUM 800 UNIT(S)/HR: 5000 INJECTION INTRAVENOUS; SUBCUTANEOUS at 04:41

## 2024-01-01 RX ADMIN — REMDESIVIR 200 MILLIGRAM(S): 5 INJECTION INTRAVENOUS at 12:20

## 2024-01-01 RX ADMIN — BUDESONIDE AND FORMOTEROL FUMARATE DIHYDRATE 2 PUFF(S): 160; 4.5 AEROSOL RESPIRATORY (INHALATION) at 21:28

## 2024-01-01 RX ADMIN — Medication 1 APPLICATION(S): at 05:34

## 2024-01-01 RX ADMIN — BUDESONIDE AND FORMOTEROL FUMARATE DIHYDRATE 2 PUFF(S): 160; 4.5 AEROSOL RESPIRATORY (INHALATION) at 21:49

## 2024-01-01 RX ADMIN — HYDROMORPHONE HYDROCHLORIDE 0.5 MILLIGRAM(S): 2 INJECTION INTRAMUSCULAR; INTRAVENOUS; SUBCUTANEOUS at 11:31

## 2024-01-01 RX ADMIN — Medication 100 MILLIGRAM(S): at 11:58

## 2024-01-01 RX ADMIN — ATORVASTATIN CALCIUM 40 MILLIGRAM(S): 80 TABLET, FILM COATED ORAL at 23:18

## 2024-01-01 RX ADMIN — Medication 1 APPLICATION(S): at 13:26

## 2024-01-01 RX ADMIN — HALOPERIDOL DECANOATE 5 MILLIGRAM(S): 100 INJECTION INTRAMUSCULAR at 19:19

## 2024-01-01 RX ADMIN — BUDESONIDE AND FORMOTEROL FUMARATE DIHYDRATE 2 PUFF(S): 160; 4.5 AEROSOL RESPIRATORY (INHALATION) at 09:31

## 2024-01-01 RX ADMIN — HEPARIN SODIUM 800 UNIT(S)/HR: 5000 INJECTION INTRAVENOUS; SUBCUTANEOUS at 05:38

## 2024-01-01 RX ADMIN — Medication 100 MILLIGRAM(S): at 11:06

## 2024-01-01 RX ADMIN — Medication 6 MILLIGRAM(S): at 13:35

## 2024-01-01 RX ADMIN — PANTOPRAZOLE SODIUM 40 MILLIGRAM(S): 20 TABLET, DELAYED RELEASE ORAL at 05:32

## 2024-01-01 RX ADMIN — PANTOPRAZOLE SODIUM 40 MILLIGRAM(S): 20 TABLET, DELAYED RELEASE ORAL at 05:11

## 2024-01-01 RX ADMIN — Medication 100 MILLIGRAM(S): at 11:24

## 2024-01-01 RX ADMIN — ATORVASTATIN CALCIUM 40 MILLIGRAM(S): 80 TABLET, FILM COATED ORAL at 22:13

## 2024-01-01 RX ADMIN — PIPERACILLIN AND TAZOBACTAM 25 GRAM(S): 4; .5 INJECTION, POWDER, LYOPHILIZED, FOR SOLUTION INTRAVENOUS at 05:37

## 2024-01-01 RX ADMIN — Medication 1 APPLICATION(S): at 21:10

## 2024-01-01 RX ADMIN — POLYETHYLENE GLYCOL 3350 17 GRAM(S): 17 POWDER, FOR SOLUTION ORAL at 17:05

## 2024-01-01 RX ADMIN — PANTOPRAZOLE SODIUM 40 MILLIGRAM(S): 20 TABLET, DELAYED RELEASE ORAL at 05:42

## 2024-01-01 RX ADMIN — Medication 1 APPLICATION(S): at 13:02

## 2024-01-01 RX ADMIN — Medication 50 MILLIGRAM(S): at 05:37

## 2024-01-01 RX ADMIN — Medication 40 MILLIGRAM(S): at 05:33

## 2024-01-01 RX ADMIN — Medication 40 MILLIEQUIVALENT(S): at 11:24

## 2024-01-01 RX ADMIN — Medication 100 MILLIGRAM(S): at 13:31

## 2024-01-01 RX ADMIN — APIXABAN 2.5 MILLIGRAM(S): 2.5 TABLET, FILM COATED ORAL at 12:28

## 2024-01-01 RX ADMIN — PANTOPRAZOLE SODIUM 40 MILLIGRAM(S): 20 TABLET, DELAYED RELEASE ORAL at 05:38

## 2024-01-01 RX ADMIN — Medication 50 MILLIGRAM(S): at 05:06

## 2024-01-01 RX ADMIN — POLYETHYLENE GLYCOL 3350 17 GRAM(S): 17 POWDER, FOR SOLUTION ORAL at 05:32

## 2024-01-01 RX ADMIN — Medication 50 MILLIGRAM(S): at 05:02

## 2024-01-01 RX ADMIN — HEPARIN SODIUM 7 UNIT(S)/HR: 5000 INJECTION INTRAVENOUS; SUBCUTANEOUS at 21:50

## 2024-01-01 RX ADMIN — HEPARIN SODIUM 8 UNIT(S)/HR: 5000 INJECTION INTRAVENOUS; SUBCUTANEOUS at 19:33

## 2024-01-01 RX ADMIN — TIOTROPIUM BROMIDE 2 PUFF(S): 18 CAPSULE ORAL; RESPIRATORY (INHALATION) at 09:06

## 2024-01-01 RX ADMIN — PANTOPRAZOLE SODIUM 40 MILLIGRAM(S): 20 TABLET, DELAYED RELEASE ORAL at 05:29

## 2024-01-01 RX ADMIN — Medication 500 MILLIGRAM(S): at 05:50

## 2024-01-01 RX ADMIN — ALBUTEROL 2 PUFF(S): 90 AEROSOL, METERED ORAL at 09:33

## 2024-01-01 RX ADMIN — BUDESONIDE AND FORMOTEROL FUMARATE DIHYDRATE 2 PUFF(S): 160; 4.5 AEROSOL RESPIRATORY (INHALATION) at 21:29

## 2024-01-01 RX ADMIN — BUDESONIDE AND FORMOTEROL FUMARATE DIHYDRATE 2 PUFF(S): 160; 4.5 AEROSOL RESPIRATORY (INHALATION) at 09:12

## 2024-01-01 RX ADMIN — HEPARIN SODIUM 5000 UNIT(S): 5000 INJECTION INTRAVENOUS; SUBCUTANEOUS at 05:49

## 2024-01-01 RX ADMIN — Medication 1 PUFF(S): at 21:13

## 2024-01-01 RX ADMIN — BUDESONIDE AND FORMOTEROL FUMARATE DIHYDRATE 2 PUFF(S): 160; 4.5 AEROSOL RESPIRATORY (INHALATION) at 21:00

## 2024-01-01 RX ADMIN — Medication 1 PUFF(S): at 04:41

## 2024-01-01 RX ADMIN — PIPERACILLIN AND TAZOBACTAM 25 GRAM(S): 4; .5 INJECTION, POWDER, LYOPHILIZED, FOR SOLUTION INTRAVENOUS at 18:09

## 2024-01-01 RX ADMIN — HEPARIN SODIUM 800 UNIT(S)/HR: 5000 INJECTION INTRAVENOUS; SUBCUTANEOUS at 07:29

## 2024-01-01 RX ADMIN — Medication 50 MILLIGRAM(S): at 06:10

## 2024-01-01 RX ADMIN — BUDESONIDE AND FORMOTEROL FUMARATE DIHYDRATE 2 PUFF(S): 160; 4.5 AEROSOL RESPIRATORY (INHALATION) at 08:27

## 2024-01-01 RX ADMIN — Medication 50 MILLILITER(S): at 05:06

## 2024-01-01 RX ADMIN — Medication 5 MILLIGRAM(S): at 13:27

## 2024-01-01 RX ADMIN — PANTOPRAZOLE SODIUM 40 MILLIGRAM(S): 20 TABLET, DELAYED RELEASE ORAL at 05:27

## 2024-01-01 RX ADMIN — APIXABAN 2.5 MILLIGRAM(S): 2.5 TABLET, FILM COATED ORAL at 05:32

## 2024-01-01 RX ADMIN — TIOTROPIUM BROMIDE 2 PUFF(S): 18 CAPSULE ORAL; RESPIRATORY (INHALATION) at 09:29

## 2024-01-01 RX ADMIN — Medication 100 MILLIGRAM(S): at 13:25

## 2024-01-01 RX ADMIN — Medication 1 SPRAY(S): at 18:04

## 2024-01-01 RX ADMIN — Medication 650 MILLIGRAM(S): at 15:51

## 2024-01-01 NOTE — PROGRESS NOTE ADULT - PROBLEM SELECTOR PLAN 2
-S/P thoracentesis with 1 L removal during last hospitalization  -Cytopath negative for malignancy. Suspect 2/2 HF exacerbation   -Pulm recs appreciated  -Pending transfer to [facility] for pleuroscopy and pleurx cath placement; accepting Dr. Guerrero and thoracic surgeon Dr. Bhat -S/P thoracentesis with 1 L removal during last hospitalization  -Cytopath negative for malignancy. Suspect 2/2 HF exacerbation   -Pulm recs appreciated  -Pending transfer to [facility] for pleuroscopy and pleurx cath placement; accepting Dr. Guerrero and thoracic surgeon Dr. Bhat -S/P thoracentesis with 1 L removal during last hospitalization  -Cytopath negative for malignancy. Suspect 2/2 HF exacerbation   -Pulm recs appreciated  -Pending transfer to [facility] for pleuroscopy and pleurx cath placement; accepting Dr. Guerrero and thoracic surgeon Dr. Bhat  -Transfer and bed confirmed at 15:30hrs. Transfer team made aware of new finding of acute cholecystitis, currently afebrile and normal WBC count.  -During afternoon, patient not actively complaining of abdominal pain -S/P thoracentesis with 1 L removal during last hospitalization  -Cytopath negative for malignancy. Suspect 2/2 HF exacerbation   -Pulm recs appreciated  -Pending transfer to [facility] for pleuroscopy and pleurx cath placement; accepting Dr. Guerrero and thoracic surgeon Dr. Bhat  -Transfer and bed confirmed at 15:30hrs. Transfer team made aware of new finding of acute cholecystitis, currently afebrile and normal WBC count.  -During afternoon, patient not actively complaining of abdominal pain

## 2024-01-01 NOTE — PROGRESS NOTE ADULT - PROBLEM SELECTOR PLAN 4
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  -Latest Cr 1.5, lasix 40mg IV administered for overload

## 2024-01-01 NOTE — PROGRESS NOTE ADULT - PROBLEM SELECTOR PLAN 1
-on admission patient clinically overloaded with significant LE edema b/l, left sided effusion and elevated pbnp  -TTE with HFpEF, suspect non compliance with meds and diet at home. Need to obtain collateral from family, patient is poor historian  -Home regimen is torsemide 100 daily per PCP Dr. Rubio    -strict I/os, daily weights, fluid restriction and low salt diet  -monitor on tele  -cardio recs appreciated: If renal function stabilizes then may consider SGLT2-I this admission  -improving volume status  - c/w po lasix -on admission patient clinically overloaded with significant LE edema b/l, left sided effusion and elevated pbnp  -TTE with HFpEF, suspect non compliance with meds and diet at home. Need to obtain collateral from family, patient is poor historian  -Home regimen is torsemide 100 daily per PCP Dr. Rubio    -strict I/os, daily weights, fluid restriction and low salt diet  -monitor on tele  -cardio recs appreciated: If renal function stabilizes then may consider SGLT2-I this admission  -improving volume status  -IV lasix 40mg due to signs of overload with LE 3+ edema, JVD without pulsation. Today's Cr 1.5 (baseline from 10/2023)  -Monitor Cr levels

## 2024-01-01 NOTE — PROGRESS NOTE ADULT - PROBLEM SELECTOR PLAN 6
-BLE open blisters not improving  -Wound care recs appreaciated: adaptic dressing applied to wounds and changed every 3 days. Use bacitracin everyday over the wounds and ACE wrap both legs to help decrease swelling -BLE open blisters not improving  -Wound care recs appreaciated: adaptic dressing applied to wounds and changed every 3 days. Use bacitracin everyday over the wounds and ACE wrap both legs to help decrease swelling  -Cont wound care

## 2024-01-01 NOTE — CHART NOTE - NSCHARTNOTEFT_GEN_A_CORE
MD called for tachycardia and TTP in RUQ. Patient tachycardic into 110s, but not sustaining >100. Patient feeling anxious and RUQ tender to palpation with questionable fullness of RUQ compared to LUQ. Xanax given and tachycardia improved to mostly the 80s, intermittently into 100s/110s. AM RUQ US ordered to assess for gallbladder pathology as a cause for the pain.

## 2024-01-01 NOTE — PROGRESS NOTE ADULT - ATTENDING COMMENTS
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 Guerrero and thoracic surgeon Dr. 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 Guerrero and thoracic surgeon Dr. 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 Guerrero and thoracic surgeon Dr. 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 Guerrero and thoracic surgeon Dr. Bhat, restart  diuresis as was held due to elevated cr, apprec cardio recs, monitor clinical course, guarded to poor prognosis, apprec palliative recs, now complicated by suspected acute cholecysititis, apprec gen surg recs Dr Angel may need more expedited course to tertiary after seen by surgery, otherwise stable lfts elevated, no wbc, afebrile, cont trend, monitor clinical course, GI curbside recs tertiary care and surgery consult-Dr Angel aware and agrees with transfer

## 2024-01-01 NOTE — PROGRESS NOTE ADULT - TIME BILLING
care coordination, plan of care discussed with patient face to face, weekend IDR team, family by primary team care coordination, plan of care discussed with patient face to face, weekend IDR team, family by primary team, Dr Angel gen surg

## 2024-01-01 NOTE — PROGRESS NOTE ADULT - PROVIDER SPECIALTY LIST ADULT
Cardiology
Cardiology
Family Medicine
Family Medicine
Pulmonology
Pulmonology
Cardiology
Palliative Care
Pulmonology
Cardiology
Pulmonology
Cardiology
Cardiology
Family Medicine
Pulmonology
Family Medicine
Family Medicine
Cardiology
Family Medicine
Family Medicine
Palliative Care
Pulmonology
Cardiology
Hospitalist
Family Medicine

## 2024-01-01 NOTE — PATIENT PROFILE ADULT - FALL HARM RISK - HARM RISK INTERVENTIONS
Assistance OOB with selected safe patient handling equipment/Communicate Risk of Fall with Harm to all staff/Discuss with provider need for PT consult/Monitor gait and stability/Provide patient with walking aids - walker, cane, crutches/Reinforce activity limits and safety measures with patient and family/Tailored Fall Risk Interventions/Visual Cue: Yellow wristband and red socks/Bed in lowest position, wheels locked, appropriate side rails in place/Call bell, personal items and telephone in reach/Instruct patient to call for assistance before getting out of bed or chair/Non-slip footwear when patient is out of bed/[place]dora to call system/Physically safe environment - no spills, clutter or unnecessary equipment/Purposeful Proactive Rounding/Room/bathroom lighting operational, light cord in reach Assistance OOB with selected safe patient handling equipment/Communicate Risk of Fall with Harm to all staff/Discuss with provider need for PT consult/Monitor gait and stability/Provide patient with walking aids - walker, cane, crutches/Reinforce activity limits and safety measures with patient and family/Tailored Fall Risk Interventions/Visual Cue: Yellow wristband and red socks/Bed in lowest position, wheels locked, appropriate side rails in place/Call bell, personal items and telephone in reach/Instruct patient to call for assistance before getting out of bed or chair/Non-slip footwear when patient is out of bed/[place] to call system/Physically safe environment - no spills, clutter or unnecessary equipment/Purposeful Proactive Rounding/Room/bathroom lighting operational, light cord in reach

## 2024-01-01 NOTE — PROGRESS NOTE ADULT - PROBLEM SELECTOR PROBLEM 1
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

## 2024-01-01 NOTE — PROGRESS NOTE ADULT - PROBLEM SELECTOR PLAN 5
-Not in exacerbation  -Target SpO2 88-92%. Avoid hyperoxygenation   -C/w spiriva, Symbicort and albuterol PRN  -Pulm recommendations appreciated  -Continue monitor on room air -Not in exacerbation  -Target SpO2 88-92%. Avoid hyperoxygenation   -C/w spiriva, Symbicort and albuterol PRN  -Pulm recommendations appreciated  -Continue monitor on room air  -Patient continued refusing spiriva/symbicort per Respiratory Therapist, may dc

## 2024-01-01 NOTE — PROGRESS NOTE ADULT - ASSESSMENT
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

## 2024-01-01 NOTE — PROGRESS NOTE ADULT - PROBLEM SELECTOR PROBLEM 5
COPD without exacerbation

## 2024-01-01 NOTE — PROGRESS NOTE ADULT - NUTRITIONAL ASSESSMENT
This patient has been assessed with a concern for Malnutrition and has been determined to have a diagnosis/diagnoses of Severe protein-calorie malnutrition.    This patient is being managed with:   Diet Regular-  1000mL Fluid Restriction ([id number])  Low Sodium  Piero(7 Gm Arginine/7 Gm Glut/1.2 Gm HMB     Qty per Day:  2  Supplement Feeding Modality:  Oral  Ensure Plus High Protein Cans or Servings Per Day:  1       Frequency:  Daily  Entered: Dec 19 2023 11:11AM   This patient has been assessed with a concern for Malnutrition and has been determined to have a diagnosis/diagnoses of Severe protein-calorie malnutrition.    This patient is being managed with:   Diet Regular-  1000mL Fluid Restriction ([id number])  Low Sodium  Piero(7 Gm Arginine/7 Gm Glut/1.2 Gm HMB     Qty per Day:  2  Supplement Feeding Modality:  Oral  Ensure Plus High Protein Cans or Servings Per Day:  1       Frequency:  Daily  Entered: Dec 19 2023 11:11AM

## 2024-01-01 NOTE — PROGRESS NOTE ADULT - PROBLEM SELECTOR PROBLEM 7
HLD (hyperlipidemia)
HLD (hyperlipidemia)
HTN (hypertension)
Enterovirus infection

## 2024-01-01 NOTE — DISCHARGE NOTE NURSING/CASE MANAGEMENT/SOCIAL WORK - NSDCPEFALRISK_GEN_ALL_CORE
For information on Fall & Injury Prevention, visit: [email or web address] OR  [email or web address] OR  https://www.cdc.gov/steadi/patient.html For information on Fall & Injury Prevention, visit: [email or web address] OR  [email or web address] OR  https://www.cdc.gov/steadi/patient.html

## 2024-01-01 NOTE — PROGRESS NOTE ADULT - PROBLEM SELECTOR PROBLEM 8
HLD (hyperlipidemia)
Prophylactic measure
HLD (hyperlipidemia)
HTN (hypertension)
HLD (hyperlipidemia)
HLD (hyperlipidemia)
Prophylactic measure

## 2024-01-01 NOTE — PROGRESS NOTE ADULT - SUBJECTIVE AND OBJECTIVE BOX
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 Patient with recent hospitalization for CHF exacerbation 11/22-11/27 and he left AMA. Patient is a poor historian and unable to give reliable history. Denies any chest pain, palpitations, N/V, HA, dizziness. Reports he takes his medications most of the time. He is concerned about blisters on his leg but unable to recognize HF exacerbation being the underlying issue. Also reports constipation but denies N/V/D. Reports lack of appetite but denies dysphagia.    INTERVAL HPI:   Patient seen and examined at bedside. Pt reports feeling ok, needed O2 on/off due to SOB at times; encourage using incentive spirometer. BLE wounds and edema stable, mild improvement. Overnight events: RUQ abdominal pain and tachycardic. Tachycardia (100-110s) suspected due to anxiety, resolved post one dose standing xanax      REVIEW OF SYSTEMS:  CONSTITUTIONAL: No weakness, fevers or chills  EYES/ENT: No visual changes;  No vertigo or throat pain   NECK: No pain or stiffness  RESPIRATORY: +SOB  CARDIOVASCULAR: No chest pain or palpitations  GASTROINTESTINAL: No abdominal or epigastric pain. No nausea, vomiting, or hematemesis; No diarrhea or constipation. No melena or hematochezia.  GENITOURINARY: No dysuria, frequency or hematuria  NEUROLOGICAL: No numbness or weakness  SKIN: No itching, burning, rashes, or lesions   All other review of systems is negative unless indicated above      Vital Signs Last 24 Hrs          PHYSICAL EXAM:  Constitutional: Pt sitting in chair, awake and alert, NAD, NC in place  HEENT: EOMI, normal hearing, moist mucous membranes  Respiratory: nonlabored breathing, good air entry, decreased breath sounds on left  Cardiovascular: S1S2+, RRR, no M/G/R  Gastrointestinal: BS+, soft, NT/ND, no guarding, no rebound  Extremities: 2+ pitting edema b/l LE, open LE blisters covered with bacitracin  Vascular: 2+ peripheral pulses  Neurological: AAOx3, no focal deficits      MEDICATIONS:  MEDICATIONS  (STANDING):  allopurinol 100 milliGRAM(s) Oral daily  apixaban 2.5 milliGRAM(s) Oral every 12 hours  aspirin enteric coated 81 milliGRAM(s) Oral daily  atorvastatin 40 milliGRAM(s) Oral at bedtime  bacitracin   Ointment 1 Application(s) Topical daily  budesonide 160 MICROgram(s)/formoterol 4.5 MICROgram(s) Inhaler 2 Puff(s) Inhalation two times a day  melatonin 3 milliGRAM(s) Oral at bedtime  metoprolol succinate ER 50 milliGRAM(s) Oral daily  pantoprazole    Tablet 40 milliGRAM(s) Oral before breakfast  polyethylene glycol 3350 17 Gram(s) Oral two times a day  senna 2 Tablet(s) Oral at bedtime  tiotropium 2.5 MICROgram(s) Inhaler 2 Puff(s) Inhalation daily      LABS: All Labs Reviewed:                                               89y year old M with PMH of 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 Patient with recent hospitalization for CHF exacerbation 11/22-11/27 and he left AMA. Patient is a poor historian and unable to give reliable history. Denies any chest pain, palpitations, N/V, HA, dizziness. Reports he takes his medications most of the time. He is concerned about blisters on his leg but unable to recognize HF exacerbation being the underlying issue. Also reports constipation but denies N/V/D. Reports lack of appetite but denies dysphagia.    INTERVAL HPI:   Patient seen and examined at bedside. Pt reports feeling ok, needed O2 on/off due to SOB at times; encourage using incentive spirometer. BLE wounds and edema stable, mild improvement. Overnight events: RUQ abdominal pain and tachycardic. Tachycardia (100-110s) suspected due to anxiety, resolved post one dose standing xanax. Pending Abdominal RUQ US      REVIEW OF SYSTEMS:  CONSTITUTIONAL: No weakness, fevers or chills  EYES/ENT: No visual changes;  No vertigo or throat pain   NECK: No pain or stiffness  RESPIRATORY: +SOB  CARDIOVASCULAR: No chest pain or palpitations  GASTROINTESTINAL: No abdominal or epigastric pain. No nausea, vomiting, or hematemesis; No diarrhea or constipation. No melena or hematochezia.  GENITOURINARY: No dysuria, frequency or hematuria  NEUROLOGICAL: No numbness or weakness  SKIN: itching leg wounds, no burning, rashes, or lesions   All other review of systems is negative unless indicated above      Vital Signs Last 24 Hrs  T(C): 36.2 (01 Jan 2024 13:05), Max: 36.4 (01 Jan 2024 05:15)  T(F): 97.1 (01 Jan 2024 13:05), Max: 97.6 (01 Jan 2024 05:15)  HR: 80 (01 Jan 2024 13:05) (60 - 85)  BP: 136/76 (01 Jan 2024 13:05) (117/69 - 136/76)  BP(mean): --  RR: 16 (01 Jan 2024 13:05) (16 - 16)  SpO2: 94% (01 Jan 2024 13:05) (92% - 98%)  Parameters below as of 01 Jan 2024 13:05  Patient On (Oxygen Delivery Method): nasal cannula  O2 Flow (L/min): 2          PHYSICAL EXAM:  Constitutional: Pt sitting in chair, awake and alert, NAD, NC in place  HEENT: EOMI, normal hearing, moist mucous membranes  Respiratory: nonlabored breathing, good air entry, decreased breath sounds b/l  Cardiovascular: S1S2+, RRR, no M/G/R  Gastrointestinal: BS+, soft, NT/ND, no guarding, no rebound  Extremities: 3+ pitting edema b/l LE, open LE blisters covered with bacitracin  Vascular: 2+ peripheral pulses  Neurological: AAOx3, no focal deficits      MEDICATIONS:  MEDICATIONS  (STANDING):  allopurinol 100 milliGRAM(s) Oral daily  apixaban 2.5 milliGRAM(s) Oral every 12 hours  aspirin enteric coated 81 milliGRAM(s) Oral daily  atorvastatin 40 milliGRAM(s) Oral at bedtime  bacitracin   Ointment 1 Application(s) Topical daily  budesonide 160 MICROgram(s)/formoterol 4.5 MICROgram(s) Inhaler 2 Puff(s) Inhalation two times a day  melatonin 3 milliGRAM(s) Oral at bedtime  metoprolol succinate ER 50 milliGRAM(s) Oral daily  pantoprazole    Tablet 40 milliGRAM(s) Oral before breakfast  polyethylene glycol 3350 17 Gram(s) Oral two times a day  senna 2 Tablet(s) Oral at bedtime  tiotropium 2.5 MICROgram(s) Inhaler 2 Puff(s) Inhalation daily      LABS: All Labs Reviewed:                          9.3    8.81  )-----------( 195      ( 01 Jan 2024 06:30 )             30.2   01-01  145  |  103  |  94<H>  ----------------------------<  106<H>  4.3   |  36<H>  |  1.54<H>  Ca    9.1      01 Jan 2024 06:30  TPro  7.5  /  Alb  2.6<L>  /  TBili  1.1  /  DBili  x   /  AST  316<H>  /  ALT  161<H>  /  AlkPhos  198<H>  01-01            Urinalysis Basic - ( 01 Jan 2024 06:30 )  Color: x / Appearance: x / SG: x / pH: x  Gluc: 106 mg/dL / Ketone: x  / Bili: x / Urobili: x   Blood: x / Protein: x / Nitrite: x   Leuk Esterase: x / RBC: x / WBC x   Sq Epi: x / Non Sq Epi: x / Bacteria: x

## 2024-01-01 NOTE — PROGRESS NOTE ADULT - REASON FOR ADMISSION
Shortness of breath/CHF exacerbation

## 2024-01-01 NOTE — PATIENT PROFILE ADULT - STATED REASON FOR ADMISSION
transferred from  to [facility] for pleural effusion transferred from  to [facility] for pleural effusion

## 2024-01-01 NOTE — PROGRESS NOTE ADULT - PROBLEM SELECTOR PROBLEM 6
HLD (hyperlipidemia)
HTN (hypertension)
Wound, open
HTN (hypertension)
Wound, open

## 2024-01-01 NOTE — DISCHARGE NOTE NURSING/CASE MANAGEMENT/SOCIAL WORK - PATIENT PORTAL LINK FT
You can access the FollowMyHealth Patient Portal offered by [facility] by registering at the following website: [email or web address]. By joining [employer]’s FollowMyHealth portal, you will also be able to view your health information using other applications (apps) compatible with our system. You can access the FollowMyHealth Patient Portal offered by [facility] by registering at the following website: [email or web address]. By joining [email or web address]’s FollowMyHealth portal, you will also be able to view your health information using other applications (apps) compatible with our system.

## 2024-01-01 NOTE — DISCHARGE NOTE NURSING/CASE MANAGEMENT/SOCIAL WORK - NSDCFUADDAPPT_GEN_ALL_CORE_FT
Transfer to [facility] for Pleuroscopy and PleurX wit Dr. Guerrero accepting Transfer to [facility] for Pleuroscopy and PleurX wit Dr. Guerrero accepting

## 2024-01-01 NOTE — PROGRESS NOTE ADULT - PROBLEM SELECTOR PLAN 9
DVT ppx: on eliquis    FULL Code  Palliative consult appreciated  Pt and family updated at bedside regarding plan of care and waiting for bed availability at [facility]  Dispo: pending transfer to [facility] for medical intervention, papers in chart  As of 12/31/23, family updated on bed status and in agreement with continuos monitoring in-house  Case was discussed with attending, Dr. Licea DVT ppx: on eliquis    FULL Code  Palliative consult appreciated  Pt and family updated at bedside regarding plan of care and waiting for bed availability at [facility]  Dispo: pending transfer to [facility] for medical intervention, papers in chart  As of 12/31/23, family updated on bed status and in agreement with continuos monitoring in-house  Case was discussed with attending, Dr. Licea DVT ppx: on eliquis    FULL Code  Palliative consult appreciated    Dispo:   As of 1/1/24, family updated on transfer status and bed assigned. Transfer to occur in the next hours. Family also updated on new acute cholecystitis finding, patient currently afebrile and no leukocytosis. Family notified transfer team has been made aware of this new finding. GI/surgical teams contacted and verbally commented on poor surgical candidate and alternatives of management if needed, making transfer increasingly beneficial.    Case was discussed with attending, Dr. Licea

## 2024-01-02 NOTE — H&P ADULT - PROBLEM SELECTOR PLAN 10
DVT: Restart Eliquis if no procedure? Heparin?  Diet: DVT: Restart Eliquis if no procedure? Heparin?  Diet: Low sodium, fluid restriction  Dispo: Pending PT DVT: Hold chemical for now given hemothorax and pending repeat CBC, SCDs  Diet: Low sodium, fluid restriction  Dispo: Pending PT

## 2024-01-02 NOTE — PROGRESS NOTE ADULT - PROBLEM SELECTOR PLAN 1
- pro-BNP stable, still 2.8K  - TTE with EF 50-55%  - Home regimen is torsemide 100 daily per PCP Dr. Rubio - started on lasix 40 IV in GC, with Farxiga - will continue   - Strict I/os, daily weights, fluid restriction and low salt diet - additional diuresis as needed  - Monitor on tele, monitor K on BMP  - Per patient LE edema appears improved - pro-BNP stable, still 2.8K  - TTE with EF 50-55%  - Home regimen is torsemide 100 daily per PCP Dr. Rubio - started on lasix 40 IV in , with Farxiga   - increased lasix to 40 bid today  - Strict I/os, daily weights, fluid restriction and low salt diet - additional diuresis as needed  - Monitor on tele, monitor K on BMP - pro-BNP stable, still 2.8K  - TTE with EF 50-55%  - Home regimen is torsemide 100 daily per PCP Dr. Rubio - started on lasix 40 IV in , with Farxiga   - increased lasix to 40 IV bid today  - Strict I/os, daily weights, fluid restriction and low salt diet - additional diuresis as needed  - Monitor on tele, monitor K on BMP

## 2024-01-02 NOTE — PROGRESS NOTE ADULT - PROBLEM SELECTOR PLAN 6
- Cholecystitis noted, bile ducts normal  - GGT  - CTM CMP - Cholecystitis noted, bile ducts normal  - GGT elevated  - CTM CMP

## 2024-01-02 NOTE — H&P ADULT - NSHPLABSRESULTS_GEN_ALL_CORE
LABS: When present labs, imaging, and ECG were personally reviewed                          9.3    8.81  )-----------( 195      ( 01 Jan 2024 06:30 )             30.2       01-01    145  |  103  |  94<H>  ----------------------------<  106<H>  4.3   |  36<H>  |  1.54<H>    Ca    9.1      01 Jan 2024 06:30    TPro  7.5  /  Alb  2.6<L>  /  TBili  1.1  /  DBili  x   /  AST  316<H>  /  ALT  161<H>  /  AlkPhos  198<H>  01-01       LIVER FUNCTIONS - ( 01 Jan 2024 06:30 )  Alb: 2.6 g/dL / Pro: 7.5 g/dL / ALK PHOS: 198 U/L / ALT: 161 U/L / AST: 316 U/L / GGT: x                    Urinalysis Basic - ( 01 Jan 2024 06:30 )    Color: x / Appearance: x / SG: x / pH: x  Gluc: 106 mg/dL / Ketone: x  / Bili: x / Urobili: x   Blood: x / Protein: x / Nitrite: x   Leuk Esterase: x / RBC: x / WBC x   Sq Epi: x / Non Sq Epi: x / Bacteria: x            Lactate Trend            CAPILLARY BLOOD GLUCOSE            Culture Results:   No growth (12-19 @ 13:46)  Culture Results:   No fungus isolated at 1 week. (12-19 @ 13:46)      RADIOLOGY & ADDITIONAL TESTS:  < from: CT Chest No Cont (12.20.23 @ 11:11) >    IMPRESSION: Moderate left pleural effusion. Band type opacity is   identified within the right lower lobe, likely related to atelectasis.   Opacity within the lingular segment of the left upper lobe and left lower   lobe is consistent with atelectasis; however, underlying parenchymal   infiltrate/consolidation cannot be excluded. Mediastinal lymph nodes   identified measuring up to 2.6 x 2.0 cm.    < end of copied text >

## 2024-01-02 NOTE — PROGRESS NOTE ADULT - SUBJECTIVE AND OBJECTIVE BOX
RAD SINGLETON  89y  Male      Patient is a 89y old  Male who presents with a chief complaint of LE blisters (02 Jan 2024 04:41)      INTERVAL HPI/OVERNIGHT EVENTS:      REVIEW OF SYSTEMS:  CONSTITUTIONAL: No fever, weight loss, or fatigue  EYES: No eye pain, visual disturbances, or discharge  ENMT:  No difficulty hearing, tinnitus, vertigo; No sinus or throat pain  NECK: No pain or stiffness  BREASTS: No pain, masses, or nipple discharge  RESPIRATORY: No cough, wheezing, chills or hemoptysis; No shortness of breath  CARDIOVASCULAR: No chest pain, palpitations, dizziness, or leg swelling  GASTROINTESTINAL: No abdominal or epigastric pain. No nausea, vomiting, or hematemesis; No diarrhea or constipation. No melena or hematochezia.  GENITOURINARY: No dysuria, frequency, hematuria, or incontinence  NEUROLOGICAL: No headaches, memory loss, loss of strength, numbness, or tremors  SKIN: No itching, burning, rashes, or lesions   LYMPH NODES: No enlarged glands  ENDOCRINE: No heat or cold intolerance; No hair loss  MUSCULOSKELETAL: No joint pain or swelling; No muscle, back, or extremity pain  PSYCHIATRIC: No depression, anxiety, mood swings, or difficulty sleeping  HEME/LYMPH: No easy bruising, or bleeding gums  ALLERY AND IMMUNOLOGIC: No hives or eczema  FAMILY HISTORY:    T(C): 36.7 (01-02-24 @ 05:23), Max: 36.7 (01-02-24 @ 05:23)  HR: 113 (01-02-24 @ 05:23) (64 - 113)  BP: 121/77 (01-02-24 @ 05:23) (121/51 - 136/76)  RR: 18 (01-02-24 @ 05:23) (16 - 18)  SpO2: 100% (01-02-24 @ 05:23) (92% - 100%)  Wt(kg): --Vital Signs Last 24 Hrs  T(C): 36.7 (02 Jan 2024 05:23), Max: 36.7 (02 Jan 2024 05:23)  T(F): 98 (02 Jan 2024 05:23), Max: 98 (02 Jan 2024 05:23)  HR: 113 (02 Jan 2024 05:23) (64 - 113)  BP: 121/77 (02 Jan 2024 05:23) (121/51 - 136/76)  BP(mean): --  RR: 18 (02 Jan 2024 05:23) (16 - 18)  SpO2: 100% (02 Jan 2024 05:23) (92% - 100%)    Parameters below as of 02 Jan 2024 05:23  Patient On (Oxygen Delivery Method): nasal cannula  O2 Flow (L/min): 2      PHYSICAL EXAM:  GENERAL: NAD, well-groomed, well-developed  HEAD:  Atraumatic, Normocephalic  EYES: EOMI, PERRLA, conjunctiva and sclera clear  ENMT: No tonsillar erythema, exudates, or enlargement; Moist mucous membranes, Good dentition, No lesions  NECK: Supple, No JVD, Normal thyroid  NERVOUS SYSTEM:  Alert & Oriented X3, Good concentration; Motor Strength 5/5 B/L upper and lower extremities; DTRs 2+ intact and symmetric  CHEST/LUNG: Clear to percussion bilaterally; No rales, rhonchi, wheezing, or rubs  HEART: Regular rate and rhythm; No murmurs, rubs, or gallops  ABDOMEN: Soft, Nontender, Nondistended; Bowel sounds present  EXTREMITIES:  2+ Peripheral Pulses, No clubbing, cyanosis, or edema  LYMPH: No lymphadenopathy noted  SKIN: No rashes or lesions    Consultant(s) Notes Reviewed:  [x ] YES  [ ] NO  Care Discussed with Consultants/Other Providers [ x] YES  [ ] NO    LABS:          RADIOLOGY & ADDITIONAL TESTS:    Imaging Personally Reviewed:  [ ] YES  [ ] NO  acetaminophen     Tablet .. 650 milliGRAM(s) Oral every 6 hours PRN  albuterol    90 MICROgram(s) HFA Inhaler 2 Puff(s) Inhalation every 6 hours PRN  allopurinol 100 milliGRAM(s) Oral daily  aluminum hydroxide/magnesium hydroxide/simethicone Suspension 30 milliLiter(s) Oral every 4 hours PRN  aspirin enteric coated 81 milliGRAM(s) Oral daily  atorvastatin 40 milliGRAM(s) Oral at bedtime  bacitracin   Ointment 1 Application(s) Topical daily  budesonide 160 MICROgram(s)/formoterol 4.5 MICROgram(s) Inhaler 2 Puff(s) Inhalation two times a day  cefTRIAXone   IVPB 1000 milliGRAM(s) IV Intermittent every 24 hours  dapagliflozin 10 milliGRAM(s) Oral daily  furosemide   Injectable 40 milliGRAM(s) IV Push daily  melatonin 3 milliGRAM(s) Oral at bedtime  metoprolol succinate ER 50 milliGRAM(s) Oral daily  metroNIDAZOLE    Tablet 500 milliGRAM(s) Oral every 8 hours  ondansetron Injectable 4 milliGRAM(s) IV Push every 8 hours PRN  pantoprazole    Tablet 40 milliGRAM(s) Oral before breakfast  polyethylene glycol 3350 17 Gram(s) Oral two times a day  senna 2 Tablet(s) Oral at bedtime  tiotropium 2.5 MICROgram(s) Inhaler 2 Puff(s) Inhalation daily      HEALTH ISSUES - PROBLEM Dx:  Pleural effusion, left    Acute cholecystitis    Acute on chronic combined systolic and diastolic congestive heart failure    Afib    Macrocytic anemia    IRINEO (acute kidney injury)    Elevated alkaline phosphatase level    Transaminitis    Need for prophylactic measure    Multiple wounds of skin           CVA, old, monoplegia upper limb CVA, old, monoplegia upper limb CVA, old, 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    Patient seen and examined at bedside. He is feeling well, eating breakfast. Complained of the blisters on his legs, feels pain there. Denied chest pain, SOB or worsening swelling.      REVIEW OF SYSTEMS:  CONSTITUTIONAL: No fever, weight loss, or fatigue  RESPIRATORY: No cough, wheezing, chills or hemoptysis; No shortness of breath  CARDIOVASCULAR: No chest pain, palpitations, dizziness, or leg swelling  GASTROINTESTINAL: No abdominal or epigastric pain. No nausea, vomiting, or hematemesis; No diarrhea or constipation. No melena or hematochezia.  GENITOURINARY: No dysuria, frequency, hematuria, or incontinence  NEUROLOGICAL: No headaches, memory loss, loss of strength, numbness, or tremors  SKIN: No itching, burning, rashes, or lesions   LYMPH NODES: No enlarged glands  ENDOCRINE: No heat or cold intolerance; No hair loss  MUSCULOSKELETAL: No joint pain or swelling; No muscle, back, or extremity pain  PSYCHIATRIC: No depression, anxiety, mood swings, or difficulty sleeping    FAMILY HISTORY:    T(C): 36.7 (01-02-24 @ 05:23), Max: 36.7 (01-02-24 @ 05:23)  HR: 113 (01-02-24 @ 05:23) (64 - 113)  BP: 121/77 (01-02-24 @ 05:23) (121/51 - 136/76)  RR: 18 (01-02-24 @ 05:23) (16 - 18)  SpO2: 100% (01-02-24 @ 05:23) (92% - 100%)  Wt(kg): --Vital Signs Last 24 Hrs  T(C): 36.7 (02 Jan 2024 05:23), Max: 36.7 (02 Jan 2024 05:23)  T(F): 98 (02 Jan 2024 05:23), Max: 98 (02 Jan 2024 05:23)  HR: 113 (02 Jan 2024 05:23) (64 - 113)  BP: 121/77 (02 Jan 2024 05:23) (121/51 - 136/76)  BP(mean): --  RR: 18 (02 Jan 2024 05:23) (16 - 18)  SpO2: 100% (02 Jan 2024 05:23) (92% - 100%)    Parameters below as of 02 Jan 2024 05:23  Patient On (Oxygen Delivery Method): nasal cannula  O2 Flow (L/min): 2      PHYSICAL EXAM:  GENERAL: NAD, well-groomed, well-developed  HEAD:  Atraumatic, Normocephalic  EYES: EOMI, PERRLA, conjunctiva and sclera clear  ENMT: No tonsillar erythema, exudates, or enlargement; Moist mucous membranes, Good dentition, No lesions  NECK: Supple, No JVD, Normal thyroid  NERVOUS SYSTEM:  Alert & Oriented X3, Good concentration; Motor Strength 5/5 B/L upper and lower extremities; DTRs 2+ intact and symmetric  CHEST/LUNG: Clear to percussion bilaterally; No rales, rhonchi, wheezing, or rubs  HEART: Regular rate and rhythm; No murmurs, rubs, or gallops  ABDOMEN: Soft, Nontender, Nondistended; Bowel sounds present  EXTREMITIES:  2+ Peripheral Pulses, No clubbing, cyanosis, or edema  LYMPH: No lymphadenopathy noted  SKIN: No rashes or lesions    Consultant(s) Notes Reviewed:  [x ] YES  [ ] NO  Care Discussed with Consultants/Other Providers [ x] YES  [ ] NO    LABS:    CBC Full  -  ( 02 Jan 2024 08:51 )  WBC Count : 8.03 K/uL  RBC Count : 2.78 M/uL  Hemoglobin : 8.8 g/dL  Hematocrit : 29.5 %  Platelet Count - Automated : 218 K/uL  Mean Cell Volume : 106.1 fL  Mean Cell Hemoglobin : 31.7 pg  Mean Cell Hemoglobin Concentration : 29.8 gm/dL  Auto Neutrophil # : 5.31 K/uL  Auto Lymphocyte # : 1.24 K/uL  Auto Monocyte # : 1.01 K/uL  Auto Eosinophil # : 0.40 K/uL  Auto Basophil # : 0.03 K/uL  Auto Neutrophil % : 66.1 %  Auto Lymphocyte % : 15.4 %  Auto Monocyte % : 12.6 %  Auto Eosinophil % : 5.0 %  Auto Basophil % : 0.4 %    01-02    143  |  100  |  86<H>  ----------------------------<  83  4.3   |  32<H>  |  1.56<H>    Ca    8.8      02 Jan 2024 08:51  Phos  4.5     01-02  Mg     3.00     01-02    TPro  7.7  /  Alb  3.5  /  TBili  0.8  /  DBili  x   /  AST  197<H>  /  ALT  165<H>  /  AlkPhos  216<H>  01-02        RADIOLOGY & ADDITIONAL TESTS:    Imaging Personally Reviewed:  [ ] YES  [ ] NO  acetaminophen     Tablet .. 650 milliGRAM(s) Oral every 6 hours PRN  albuterol    90 MICROgram(s) HFA Inhaler 2 Puff(s) Inhalation every 6 hours PRN  allopurinol 100 milliGRAM(s) Oral daily  aluminum hydroxide/magnesium hydroxide/simethicone Suspension 30 milliLiter(s) Oral every 4 hours PRN  aspirin enteric coated 81 milliGRAM(s) Oral daily  atorvastatin 40 milliGRAM(s) Oral at bedtime  bacitracin   Ointment 1 Application(s) Topical daily  budesonide 160 MICROgram(s)/formoterol 4.5 MICROgram(s) Inhaler 2 Puff(s) Inhalation two times a day  cefTRIAXone   IVPB 1000 milliGRAM(s) IV Intermittent every 24 hours  dapagliflozin 10 milliGRAM(s) Oral daily  furosemide   Injectable 40 milliGRAM(s) IV Push daily  melatonin 3 milliGRAM(s) Oral at bedtime  metoprolol succinate ER 50 milliGRAM(s) Oral daily  metroNIDAZOLE    Tablet 500 milliGRAM(s) Oral every 8 hours  ondansetron Injectable 4 milliGRAM(s) IV Push every 8 hours PRN  pantoprazole    Tablet 40 milliGRAM(s) Oral before breakfast  polyethylene glycol 3350 17 Gram(s) Oral two times a day  senna 2 Tablet(s) Oral at bedtime  tiotropium 2.5 MICROgram(s) Inhaler 2 Puff(s) Inhalation daily      HEALTH ISSUES - PROBLEM Dx:  Pleural effusion, left    Acute cholecystitis    Acute on chronic combined systolic and diastolic congestive heart failure    Afib    Macrocytic anemia    IRINEO (acute kidney injury)    Elevated alkaline phosphatase level    Transaminitis    Need for prophylactic measure    Multiple wounds of skin

## 2024-01-02 NOTE — PROGRESS NOTE ADULT - PROBLEM SELECTOR PLAN 9
- BLE open blisters not improving  - Wound care recs appreaciated: adaptic dressing applied to wounds and changed every 3 days. Use bacitracin everyday over the wounds and ACE wrap both legs to help decrease swelling  - Cont wound care. negative - BLE open blisters not improving  - Wound care recs appreciated: adaptic dressing applied to wounds and changed every 3 days. Use bacitracin everyday over the wounds and ACE wrap both legs to help decrease swelling  - Cont wound care

## 2024-01-02 NOTE — H&P ADULT - PROBLEM SELECTOR PLAN 7
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

## 2024-01-02 NOTE — DIETITIAN INITIAL EVALUATION ADULT - PROBLEM SELECTOR PLAN 4
-C/w rate control with metoprolol  -On eliquis, will hold ISO procedures and ?hemothorax - will need to discuss risks/benefits with patient and family of continuing AC

## 2024-01-02 NOTE — H&P ADULT - ASSESSMENT
Mr. Antony is an 89 YO man with PMH of afib on Eliquis, b/l Le blisters, CHF (EF 45-50%, combined systolic and diastolic), CVA, HTN, COPD, CKD3, recent hospitalization for CHF exacerbation 11/22-11/27 where he left AMA, presenting with concern for LE blisters, found to have acute on chronic CHF, large pleural effusion s/p 1L removal, transferred from  for pleurX placement.  Mr. Antony is an 88 YO man with PMH of afib on Eliquis, b/l Le blisters, CHF (EF 45-50%, combined systolic and diastolic), CVA, HTN, COPD, CKD3, recent hospitalization for CHF exacerbation 11/22-11/27 where he left AMA, presenting with concern for LE blisters, found to have acute on chronic CHF, large pleural effusion s/p 1L removal, transferred from  for pleurX placement.

## 2024-01-02 NOTE — PROGRESS NOTE ADULT - TIME BILLING
Time-based billing (NON-critical care).     50 minutes spent on total encounter; more than 50% of the visit was spent counseling and / or coordinating care by the attending physician.  The necessity of the time spent during the encounter on this date of service was due to:     documentation in [place], reviewing chart and coordinating care with patient/resident and interdisciplinary staff (such as , social workers, etc) as well as reviewing vitals, laboratory data, radiology, medication list, consultants' recommendations and prior records. Interventions were performed as documented above. Time-based billing (NON-critical care).     50 minutes spent on total encounter; more than 50% of the visit was spent counseling and / or coordinating care by the attending physician.  The necessity of the time spent during the encounter on this date of service was due to:     documentation in [place], reviewing chart and coordinating care with patient/resident and interdisciplinary staff (such as , social workers, etc) as well as reviewing vitals, laboratory data, radiology, medication list, consultants' recommendations and prior records. Interventions were performed as documented above.

## 2024-01-02 NOTE — CONSULT NOTE ADULT - SUBJECTIVE AND OBJECTIVE BOX
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  He had recently been hospitalized for a CHF exacerbation from 11/22 to 11/27/23 when he left AMA.  Patient is known to be a poor historian and is unable to give a reliable history. He denies chest pain, palpitations, N/V, HA, or dizziness. He admits to occasional anxiety, SOB, constipation, and RUQ tenderness.  .      PAST MEDICAL & SURGICAL HISTORY:  Afib (on Eliquis)  B/L LE Blisters from HF exacerbation  CHF  CVA  HTN  COPD  No significant past surgical history    REVIEW OF SYSTEMS  CONSTITUTIONAL: No weakness, fevers or chills  EYES/ENT: No visual changes;  No vertigo or throat pain   NECK: No pain or stiffness  RESPIRATORY: +SOB  CARDIOVASCULAR: No chest pain or palpitations  GASTROINTESTINAL: No abdominal or epigastric pain. No nausea, vomiting, or hematemesis; No diarrhea or constipation. No melena or hematochezia.  GENITOURINARY: No dysuria, frequency or hematuria  NEUROLOGICAL: No numbness or weakness  SKIN: pruritic leg wounds from CHF exacerbation  All other review of systems is negative unless indicated above    MEDICATIONS  (At  Hosp):  allopurinol 100 milliGRAM(s) Oral daily  apixaban 2.5 milliGRAM(s) Oral every 12 hours  aspirin enteric coated 81 milliGRAM(s) Oral daily  atorvastatin 40 milliGRAM(s) Oral at bedtime  bacitracin   Ointment 1 Application(s) Topical daily  budesonide 160 MICROgram(s)/formoterol 4.5 MICROgram(s) Inhaler 2 Puff(s) Inhalation two times a day  melatonin 3 milliGRAM(s) Oral at bedtime  metoprolol succinate ER 50 milliGRAM(s) Oral daily  pantoprazole    Tablet 40 milliGRAM(s) Oral before breakfast  polyethylene glycol 3350 17 Gram(s) Oral two times a day  senna 2 Tablet(s) Oral at bedtime  tiotropium 2.5 MICROgram(s) Inhaler 2 Puff(s) Inhalation daily    ALLERGIES  No Known Allergies    SOCIAL HISTORY:  Denies EtOH, Tobacco, Recreational drugs    FAMILY HISTORY:  Not contributory    PHYSICAL EXAM:  Vital Signs Last 24 Hrs  T(C): 36.4 (01 Jan 2024 20:43), Max: 36.4 (01 Jan 2024 05:15)  T(F): 97.5 (01 Jan 2024 20:43), Max: 97.6 (01 Jan 2024 05:15)  HR: 80 (01 Jan 2024 20:43) (60 - 82)  BP: 131/55 (01 Jan 2024 20:43) (117/69 - 136/76)  RR: 18 (01 Jan 2024 20:43) (16 - 18)  SpO2: 100% (01 Jan 2024 20:43) (92% - 100%) NC    Constitutional: Pt sitting in chair, awake and alert, NAD, NC in place  HEENT: EOMI, normal hearing, moist mucous membranes  Respiratory: nonlabored breathing, good air entry, decreased L breath sounds  Cardiovascular: S1S2, ,RR,   Gastrointestinal: BS+, soft, NT/ND, no guarding, no rebound  Extremities: 2+ pitting edema b/l LE, open LE blisters covered with bacitracin  Vascular: 2+ peripheral pulses  Neurological: AAOx3, no focal deficits    LABS:                        9.3    8.81  )-----------( 195      ( 01 Jan 2024 06:30 )             30.2     01-01    145  |  103  |  94<H>  ----------------------------<  106<H>  4.3   |  36<H>  |  1.54<H>    Ca    9.1      01 Jan 2024 06:30    TPro  7.5  /  Alb  2.6<L>  /  TBili  1.1  /  DBili  x   /  AST  316<H>  /  ALT  161<H>  /  AlkPhos  198<H>  01-01      Urinalysis Basic - ( 01 Jan 2024 06:30 )  Color: x / Appearance: x / SG: x / pH: x  Gluc: 106 mg/dL / Ketone: x  / Bili: x / Urobili: x   Blood: x / Protein: x / Nitrite: x   Leuk Esterase: x / RBC: x / WBC x   Sq Epi: x / Non Sq Epi: x / Bacteria: x    RADIOLOGY & ADDITIONAL STUDIES:  US Abdomen Upper Quadrant Right (01.01.24 @ 08:45) >  Findings suspicious for acute cholecystitis.  Echogenic right kidney, suggestive of renal parenchymal disease.    CT Chest No Cont (12.20.23 @ 11:11) >   Moderate left pleural effusion. Band type opacity is   identified within the right lower lobe, likely related to atelectasis.   Opacity within the lingular segment of the left upper lobe and left lower   lobe is consistent with atelectasis; however, underlying parenchymal   infiltrate/consolidation cannot be excluded. Mediastinal lymph nodes   identified measuring up to 2.6 x 2.0 cm.    Xray Chest 1 View-  (12.27.23 @ 15:46) >  Persistent large left effusion.               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  He had recently been hospitalized for a CHF exacerbation from 11/22 to 11/27/23 when he left AMA.  Patient is known to be a poor historian and is unable to give a reliable history. He denies chest pain, palpitations, N/V, HA, or dizziness. He admits to occasional anxiety, SOB, constipation, and RUQ tenderness.  .      PAST MEDICAL & SURGICAL HISTORY:  Afib (on Eliquis)  B/L LE Blisters from HF exacerbation  CHF  CVA  HTN  COPD  No significant past surgical history    REVIEW OF SYSTEMS  CONSTITUTIONAL: No weakness, fevers or chills  EYES/ENT: No visual changes;  No vertigo or throat pain   NECK: No pain or stiffness  RESPIRATORY: +SOB  CARDIOVASCULAR: No chest pain or palpitations  GASTROINTESTINAL: No abdominal or epigastric pain. No nausea, vomiting, or hematemesis; No diarrhea or constipation. No melena or hematochezia.  GENITOURINARY: No dysuria, frequency or hematuria  NEUROLOGICAL: No numbness or weakness  SKIN: pruritic leg wounds from CHF exacerbation  All other review of systems is negative unless indicated above    MEDICATIONS  (At  Hosp):  allopurinol 100 milliGRAM(s) Oral daily  apixaban 2.5 milliGRAM(s) Oral every 12 hours  aspirin enteric coated 81 milliGRAM(s) Oral daily  atorvastatin 40 milliGRAM(s) Oral at bedtime  bacitracin   Ointment 1 Application(s) Topical daily  budesonide 160 MICROgram(s)/formoterol 4.5 MICROgram(s) Inhaler 2 Puff(s) Inhalation two times a day  melatonin 3 milliGRAM(s) Oral at bedtime  metoprolol succinate ER 50 milliGRAM(s) Oral daily  pantoprazole    Tablet 40 milliGRAM(s) Oral before breakfast  polyethylene glycol 3350 17 Gram(s) Oral two times a day  senna 2 Tablet(s) Oral at bedtime  tiotropium 2.5 MICROgram(s) Inhaler 2 Puff(s) Inhalation daily    ALLERGIES  No Known Allergies    SOCIAL HISTORY:  Denies EtOH, Tobacco, Recreational drugs    FAMILY HISTORY:  Not contributory    PHYSICAL EXAM:  Vital Signs Last 24 Hrs  T(C): 36.4 (01 Jan 2024 20:43), Max: 36.4 (01 Jan 2024 05:15)  T(F): 97.5 (01 Jan 2024 20:43), Max: 97.6 (01 Jan 2024 05:15)  HR: 80 (01 Jan 2024 20:43) (60 - 82)  BP: 131/55 (01 Jan 2024 20:43) (117/69 - 136/76)  RR: 18 (01 Jan 2024 20:43) (16 - 18)  SpO2: 100% (01 Jan 2024 20:43) (92% - 100%) NC    Constitutional: Pt sitting in chair, awake and alert, NAD, NC in place  HEENT: EOMI, normal hearing, moist mucous membranes  Respiratory: nonlabored breathing, good air entry, decreased L breath sounds  Cardiovascular: S1S2, ,RR,   Gastrointestinal: BS+, soft, NT/ND, no guarding, no rebound  Extremities: 2+ pitting edema b/l LE, open LE blisters covered with bacitracin  Vascular: 2+ peripheral pulses  Neurological: AAOx3, no focal deficits    LABS:                        9.3    8.81  )-----------( 195      ( 01 Jan 2024 06:30 )             30.2     01-01    145  |  103  |  94<H>  ----------------------------<  106<H>  4.3   |  36<H>  |  1.54<H>    Ca    9.1      01 Jan 2024 06:30    TPro  7.5  /  Alb  2.6<L>  /  TBili  1.1  /  DBili  x   /  AST  316<H>  /  ALT  161<H>  /  AlkPhos  198<H>  01-01      Urinalysis Basic - ( 01 Jan 2024 06:30 )  Color: x / Appearance: x / SG: x / pH: x  Gluc: 106 mg/dL / Ketone: x  / Bili: x / Urobili: x   Blood: x / Protein: x / Nitrite: x   Leuk Esterase: x / RBC: x / WBC x   Sq Epi: x / Non Sq Epi: x / Bacteria: x    RADIOLOGY & ADDITIONAL STUDIES:  US Abdomen Upper Quadrant Right (01.01.24 @ 08:45) >  Findings suspicious for acute cholecystitis.  Echogenic right kidney, suggestive of renal parenchymal disease.    CT Chest No Cont (12.20.23 @ 11:11) >   Moderate left pleural effusion. Band type opacity is   identified within the right lower lobe, likely related to atelectasis.   Opacity within the lingular segment of the left upper lobe and left lower   lobe is consistent with atelectasis; however, underlying parenchymal   infiltrate/consolidation cannot be excluded. Mediastinal lymph nodes   identified measuring up to 2.6 x 2.0 cm.    Xray Chest 1 View-  (12.27.23 @ 15:46) >  Persistent large left effusion.               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 ER on 12/18/23 due to LE edema and shortness of breath.  He had recently been hospitalized for a CHF exacerbation from 11/22 to 11/27/23 when he left AMA.  Patient is known to be a poor historian and is unable to give a reliable history. He denies chest pain, palpitations, N/V, HA, or dizziness. He admits to occasional anxiety, SOB, constipation, and RUQ tenderness.  .      PAST MEDICAL & SURGICAL HISTORY:  Afib (on Eliquis)  B/L LE Blisters from HF exacerbation  CHF  CVA  HTN  COPD  No significant past surgical history    REVIEW OF SYSTEMS  CONSTITUTIONAL: No weakness, fevers or chills  EYES/ENT: No visual changes;  No vertigo or throat pain   NECK: No pain or stiffness  RESPIRATORY: +SOB  CARDIOVASCULAR: No chest pain or palpitations  GASTROINTESTINAL: No abdominal or epigastric pain. No nausea, vomiting, or hematemesis; No diarrhea or constipation. No melena or hematochezia.  GENITOURINARY: No dysuria, frequency or hematuria  NEUROLOGICAL: No numbness or weakness  SKIN: pruritic leg wounds from CHF exacerbation  All other review of systems is negative unless indicated above    MEDICATIONS  (At  Hosp):  allopurinol 100 milliGRAM(s) Oral daily  apixaban 2.5 milliGRAM(s) Oral every 12 hours  aspirin enteric coated 81 milliGRAM(s) Oral daily  atorvastatin 40 milliGRAM(s) Oral at bedtime  bacitracin   Ointment 1 Application(s) Topical daily  budesonide 160 MICROgram(s)/formoterol 4.5 MICROgram(s) Inhaler 2 Puff(s) Inhalation two times a day  melatonin 3 milliGRAM(s) Oral at bedtime  metoprolol succinate ER 50 milliGRAM(s) Oral daily  pantoprazole    Tablet 40 milliGRAM(s) Oral before breakfast  polyethylene glycol 3350 17 Gram(s) Oral two times a day  senna 2 Tablet(s) Oral at bedtime  tiotropium 2.5 MICROgram(s) Inhaler 2 Puff(s) Inhalation daily    ALLERGIES  No Known Allergies    SOCIAL HISTORY:  Denies EtOH, Tobacco, Recreational drugs    FAMILY HISTORY:  Not contributory    PHYSICAL EXAM:  Vital Signs Last 24 Hrs  T(C): 36.4 (01 Jan 2024 20:43), Max: 36.4 (01 Jan 2024 05:15)  T(F): 97.5 (01 Jan 2024 20:43), Max: 97.6 (01 Jan 2024 05:15)  HR: 80 (01 Jan 2024 20:43) (60 - 82)  BP: 131/55 (01 Jan 2024 20:43) (117/69 - 136/76)  RR: 18 (01 Jan 2024 20:43) (16 - 18)  SpO2: 100% (01 Jan 2024 20:43) (92% - 100%) NC    Constitutional: Pt sitting in chair, awake and alert, NAD, NC in place  HEENT: EOMI, normal hearing, moist mucous membranes  Respiratory: nonlabored breathing, good air entry, decreased L breath sounds  Cardiovascular: S1S2, ,RR,   Gastrointestinal: BS+, soft, NT/ND, no guarding, no rebound  Extremities: 2+ pitting edema b/l LE, open LE blisters covered with bacitracin  Vascular: 2+ peripheral pulses  Neurological: AAOx3, no focal deficits    LABS:                        9.3    8.81  )-----------( 195      ( 01 Jan 2024 06:30 )             30.2     01-01    145  |  103  |  94<H>  ----------------------------<  106<H>  4.3   |  36<H>  |  1.54<H>    Ca    9.1      01 Jan 2024 06:30    TPro  7.5  /  Alb  2.6<L>  /  TBili  1.1  /  DBili  x   /  AST  316<H>  /  ALT  161<H>  /  AlkPhos  198<H>  01-01    Urinalysis Basic - ( 01 Jan 2024 06:30 )  Color: x / Appearance: x / SG: x / pH: x  Gluc: 106 mg/dL / Ketone: x  / Bili: x / Urobili: x   Blood: x / Protein: x / Nitrite: x   Leuk Esterase: x / RBC: x / WBC x   Sq Epi: x / Non Sq Epi: x / Bacteria: x    RADIOLOGY & ADDITIONAL STUDIES:  US Abdomen Upper Quadrant Right (01.01.24 @ 08:45) >  Findings suspicious for acute cholecystitis.  Echogenic right kidney, suggestive of renal parenchymal disease.    CT Chest No Cont (12.20.23 @ 11:11) >   Moderate left pleural effusion. Band type opacity is   identified within the right lower lobe, likely related to atelectasis.   Opacity within the lingular segment of the left upper lobe and left lower   lobe is consistent with atelectasis; however, underlying parenchymal   infiltrate/consolidation cannot be excluded. Mediastinal lymph nodes   identified measuring up to 2.6 x 2.0 cm.    Xray Chest 1 View-  (12.27.23 @ 15:46) >  Persistent large left effusion.               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  He had recently been hospitalized for a CHF exacerbation from 11/22 to 11/27/23 when he left AMA.  Patient is known to be a poor historian and is unable to give a reliable history. He denies chest pain, palpitations, N/V, HA, or dizziness. He admits to occasional anxiety, SOB, constipation, and RUQ tenderness.  .      PAST MEDICAL & SURGICAL HISTORY:  Afib (on Eliquis)  B/L LE Blisters from HF exacerbation  CHF  CVA  HTN  COPD  No significant past surgical history    REVIEW OF SYSTEMS  CONSTITUTIONAL: No weakness, fevers or chills  EYES/ENT: No visual changes;  No vertigo or throat pain   NECK: No pain or stiffness  RESPIRATORY: +SOB  CARDIOVASCULAR: No chest pain or palpitations  GASTROINTESTINAL: No abdominal or epigastric pain. No nausea, vomiting, or hematemesis; No diarrhea or constipation. No melena or hematochezia.  GENITOURINARY: No dysuria, frequency or hematuria  NEUROLOGICAL: No numbness or weakness  SKIN: pruritic leg wounds from CHF exacerbation  All other review of systems is negative unless indicated above    MEDICATIONS  (At  Hosp):  allopurinol 100 milliGRAM(s) Oral daily  apixaban 2.5 milliGRAM(s) Oral every 12 hours  aspirin enteric coated 81 milliGRAM(s) Oral daily  atorvastatin 40 milliGRAM(s) Oral at bedtime  bacitracin   Ointment 1 Application(s) Topical daily  budesonide 160 MICROgram(s)/formoterol 4.5 MICROgram(s) Inhaler 2 Puff(s) Inhalation two times a day  melatonin 3 milliGRAM(s) Oral at bedtime  metoprolol succinate ER 50 milliGRAM(s) Oral daily  pantoprazole    Tablet 40 milliGRAM(s) Oral before breakfast  polyethylene glycol 3350 17 Gram(s) Oral two times a day  senna 2 Tablet(s) Oral at bedtime  tiotropium 2.5 MICROgram(s) Inhaler 2 Puff(s) Inhalation daily    ALLERGIES  No Known Allergies    SOCIAL HISTORY:  Denies EtOH, Tobacco, Recreational drugs    FAMILY HISTORY:  Not contributory    PHYSICAL EXAM:  Vital Signs Last 24 Hrs  T(C): 36.4 (01 Jan 2024 20:43), Max: 36.4 (01 Jan 2024 05:15)  T(F): 97.5 (01 Jan 2024 20:43), Max: 97.6 (01 Jan 2024 05:15)  HR: 80 (01 Jan 2024 20:43) (60 - 82)  BP: 131/55 (01 Jan 2024 20:43) (117/69 - 136/76)  RR: 18 (01 Jan 2024 20:43) (16 - 18)  SpO2: 100% (01 Jan 2024 20:43) (92% - 100%) NC    Constitutional: Pt sitting in chair, awake and alert, NAD, NC in place  HEENT: EOMI, normal hearing, moist mucous membranes  Respiratory: nonlabored breathing, good air entry, decreased L breath sounds  Cardiovascular: S1S2, ,RR,   Gastrointestinal: BS+, soft, NT/ND, no guarding, no rebound  Extremities: 2+ pitting edema b/l LE, open LE blisters covered with bacitracin  Vascular: 2+ peripheral pulses  Neurological: AAOx3, no focal deficits    LABS:                        9.3    8.81  )-----------( 195      ( 01 Jan 2024 06:30 )             30.2     01-01    145  |  103  |  94<H>  ----------------------------<  106<H>  4.3   |  36<H>  |  1.54<H>    Ca    9.1      01 Jan 2024 06:30    TPro  7.5  /  Alb  2.6<L>  /  TBili  1.1  /  DBili  x   /  AST  316<H>  /  ALT  161<H>  /  AlkPhos  198<H>  01-01    Urinalysis Basic - ( 01 Jan 2024 06:30 )  Color: x / Appearance: x / SG: x / pH: x  Gluc: 106 mg/dL / Ketone: x  / Bili: x / Urobili: x   Blood: x / Protein: x / Nitrite: x   Leuk Esterase: x / RBC: x / WBC x   Sq Epi: x / Non Sq Epi: x / Bacteria: x    RADIOLOGY & ADDITIONAL STUDIES:  US Abdomen Upper Quadrant Right (01.01.24 @ 08:45) >  Findings suspicious for acute cholecystitis.  Echogenic right kidney, suggestive of renal parenchymal disease.    CT Chest No Cont (12.20.23 @ 11:11) >   Moderate left pleural effusion. Band type opacity is   identified within the right lower lobe, likely related to atelectasis.   Opacity within the lingular segment of the left upper lobe and left lower   lobe is consistent with atelectasis; however, underlying parenchymal   infiltrate/consolidation cannot be excluded. Mediastinal lymph nodes   identified measuring up to 2.6 x 2.0 cm.    Xray Chest 1 View-  (12.27.23 @ 15:46) >  Persistent large left effusion.               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  He had recently been hospitalized for a CHF exacerbation from 11/22 to 11/27/23 when he left AMA.  Patient is known to be a poor historian and is unable to give a reliable history. He denies chest pain, palpitations, N/V, HA, or dizziness. He admits to occasional anxiety, SOB, constipation, and RUQ tenderness.  HE was transferred to [facility] for further medical management and to have a thoracic surgery evaluation for a possible PleurX catheter.      PAST MEDICAL & SURGICAL HISTORY:  Afib (on Eliquis)  B/L LE Blisters from HF exacerbation  CHF  CVA  HTN  COPD  No significant past surgical history    REVIEW OF SYSTEMS  CONSTITUTIONAL: No weakness, fevers or chills  EYES/ENT: No visual changes;  No vertigo or throat pain   NECK: No pain or stiffness  RESPIRATORY: +SOB  CARDIOVASCULAR: No chest pain or palpitations  GASTROINTESTINAL: No abdominal or epigastric pain. No nausea, vomiting, or hematemesis; No diarrhea or constipation. No melena or hematochezia.  GENITOURINARY: No dysuria, frequency or hematuria  NEUROLOGICAL: No numbness or weakness  SKIN: pruritic leg wounds from CHF exacerbation  All other review of systems is negative unless indicated above    MEDICATIONS  (At [facility]):  allopurinol 100 milliGRAM(s) Oral daily  apixaban 2.5 milliGRAM(s) Oral every 12 hours  aspirin enteric coated 81 milliGRAM(s) Oral daily  atorvastatin 40 milliGRAM(s) Oral at bedtime  bacitracin   Ointment 1 Application(s) Topical daily  budesonide 160 MICROgram(s)/formoterol 4.5 MICROgram(s) Inhaler 2 Puff(s) Inhalation two times a day  melatonin 3 milliGRAM(s) Oral at bedtime  metoprolol succinate ER 50 milliGRAM(s) Oral daily  pantoprazole    Tablet 40 milliGRAM(s) Oral before breakfast  polyethylene glycol 3350 17 Gram(s) Oral two times a day  senna 2 Tablet(s) Oral at bedtime  tiotropium 2.5 MICROgram(s) Inhaler 2 Puff(s) Inhalation daily    ALLERGIES  No Known Allergies    SOCIAL HISTORY:  Denies EtOH, Tobacco, Recreational drugs    FAMILY HISTORY:  Not contributory    PHYSICAL EXAM:  Vital Signs Last 24 Hrs  T(C): 36.4 (01 Jan 2024 20:43), Max: 36.4 (01 Jan 2024 05:15)  T(F): 97.5 (01 Jan 2024 20:43), Max: 97.6 (01 Jan 2024 05:15)  HR: 80 (01 Jan 2024 20:43) (60 - 82)  BP: 131/55 (01 Jan 2024 20:43) (117/69 - 136/76)  RR: 18 (01 Jan 2024 20:43) (16 - 18)  SpO2: 100% (01 Jan 2024 20:43) (92% - 100%) NC    Constitutional: Pt sitting in chair, awake and alert, NAD, NC in place  HEENT: EOMI, normal hearing, moist mucous membranes  Respiratory: nonlabored breathing, good air entry, decreased L breath sounds  Cardiovascular: S1S2, ,RR,   Gastrointestinal: BS+, soft, NT/ND, no guarding, no rebound  Extremities: 2+ pitting edema b/l LE, open LE blisters covered with bacitracin  Vascular: 2+ peripheral pulses  Neurological: AAOx3, no focal deficits    LABS:                        9.3    8.81  )-----------( 195      ( 01 Jan 2024 06:30 )             30.2     01-01    145  |  103  |  94<H>  ----------------------------<  106<H>  4.3   |  36<H>  |  1.54<H>    Ca    9.1      01 Jan 2024 06:30    TPro  7.5  /  Alb  2.6<L>  /  TBili  1.1  /  DBili  x   /  AST  316<H>  /  ALT  161<H>  /  AlkPhos  198<H>  01-01    Urinalysis Basic - ( 01 Jan 2024 06:30 )  Color: x / Appearance: x / SG: x / pH: x  Gluc: 106 mg/dL / Ketone: x  / Bili: x / Urobili: x   Blood: x / Protein: x / Nitrite: x   Leuk Esterase: x / RBC: x / WBC x   Sq Epi: x / Non Sq Epi: x / Bacteria: x    RADIOLOGY & ADDITIONAL STUDIES:  US Abdomen Upper Quadrant Right (01.01.24 @ 08:45) >  Findings suspicious for acute cholecystitis.  Echogenic right kidney, suggestive of renal parenchymal disease.    CT Chest No Cont (12.20.23 @ 11:11) >   Moderate left pleural effusion. Band type opacity is   identified within the right lower lobe, likely related to atelectasis.   Opacity within the lingular segment of the left upper lobe and left lower   lobe is consistent with atelectasis; however, underlying parenchymal   infiltrate/consolidation cannot be excluded. Mediastinal lymph nodes   identified measuring up to 2.6 x 2.0 cm.    Xray Chest 1 View-  (12.27.23 @ 15:46) >  Persistent large left effusion.               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  He had recently been hospitalized for a CHF exacerbation from 11/22 to 11/27/23 when he left AMA.  Patient is known to be a poor historian and is unable to give a reliable history. He denies chest pain, palpitations, N/V, HA, or dizziness. He admits to occasional anxiety, SOB, constipation, and RUQ tenderness.  HE was transferred to [facility] for further medical management and to have a thoracic surgery evaluation for a possible PleurX catheter.      PAST MEDICAL & SURGICAL HISTORY:  Afib (on Eliquis)  B/L LE Blisters from HF exacerbation  CHF  CVA  HTN  COPD  No significant past surgical history    REVIEW OF SYSTEMS  CONSTITUTIONAL: No weakness, fevers or chills  EYES/ENT: No visual changes;  No vertigo or throat pain   NECK: No pain or stiffness  RESPIRATORY: +SOB  CARDIOVASCULAR: No chest pain or palpitations  GASTROINTESTINAL: No abdominal or epigastric pain. No nausea, vomiting, or hematemesis; No diarrhea or constipation. No melena or hematochezia.  GENITOURINARY: No dysuria, frequency or hematuria  NEUROLOGICAL: No numbness or weakness  SKIN: pruritic leg wounds from CHF exacerbation  All other review of systems is negative unless indicated above    MEDICATIONS  (At [facility]):  allopurinol 100 milliGRAM(s) Oral daily  apixaban 2.5 milliGRAM(s) Oral every 12 hours  aspirin enteric coated 81 milliGRAM(s) Oral daily  atorvastatin 40 milliGRAM(s) Oral at bedtime  bacitracin   Ointment 1 Application(s) Topical daily  budesonide 160 MICROgram(s)/formoterol 4.5 MICROgram(s) Inhaler 2 Puff(s) Inhalation two times a day  melatonin 3 milliGRAM(s) Oral at bedtime  metoprolol succinate ER 50 milliGRAM(s) Oral daily  pantoprazole    Tablet 40 milliGRAM(s) Oral before breakfast  polyethylene glycol 3350 17 Gram(s) Oral two times a day  senna 2 Tablet(s) Oral at bedtime  tiotropium 2.5 MICROgram(s) Inhaler 2 Puff(s) Inhalation daily    ALLERGIES  No Known Allergies    SOCIAL HISTORY:  Denies EtOH, Tobacco, Recreational drugs    FAMILY HISTORY:  Not contributory    PHYSICAL EXAM:  Vital Signs Last 24 Hrs  T(C): 36.4 (01 Jan 2024 20:43), Max: 36.4 (01 Jan 2024 05:15)  T(F): 97.5 (01 Jan 2024 20:43), Max: 97.6 (01 Jan 2024 05:15)  HR: 80 (01 Jan 2024 20:43) (60 - 82)  BP: 131/55 (01 Jan 2024 20:43) (117/69 - 136/76)  RR: 18 (01 Jan 2024 20:43) (16 - 18)  SpO2: 100% (01 Jan 2024 20:43) (92% - 100%) NC    Constitutional: Pt sitting in chair, awake and alert, NAD, NC in place  HEENT: EOMI, normal hearing, moist mucous membranes  Respiratory: nonlabored breathing, good air entry, decreased L breath sounds  Cardiovascular: S1S2, ,RR,   Gastrointestinal: BS+, soft, NT/ND, no guarding, no rebound  Extremities: 2+ pitting edema b/l LE, open LE blisters covered with bacitracin  Vascular: 2+ peripheral pulses  Neurological: AAOx3, no focal deficits    LABS:                        9.3    8.81  )-----------( 195      ( 01 Jan 2024 06:30 )             30.2     01-01    145  |  103  |  94<H>  ----------------------------<  106<H>  4.3   |  36<H>  |  1.54<H>    Ca    9.1      01 Jan 2024 06:30    TPro  7.5  /  Alb  2.6<L>  /  TBili  1.1  /  DBili  x   /  AST  316<H>  /  ALT  161<H>  /  AlkPhos  198<H>  01-01    Urinalysis Basic - ( 01 Jan 2024 06:30 )  Color: x / Appearance: x / SG: x / pH: x  Gluc: 106 mg/dL / Ketone: x  / Bili: x / Urobili: x   Blood: x / Protein: x / Nitrite: x   Leuk Esterase: x / RBC: x / WBC x   Sq Epi: x / Non Sq Epi: x / Bacteria: x    RADIOLOGY & ADDITIONAL STUDIES:  US Abdomen Upper Quadrant Right (01.01.24 @ 08:45) >  Findings suspicious for acute cholecystitis.  Echogenic right kidney, suggestive of renal parenchymal disease.    CT Chest No Cont (12.20.23 @ 11:11) >   Moderate left pleural effusion. Band type opacity is   identified within the right lower lobe, likely related to atelectasis.   Opacity within the lingular segment of the left upper lobe and left lower   lobe is consistent with atelectasis; however, underlying parenchymal   infiltrate/consolidation cannot be excluded. Mediastinal lymph nodes   identified measuring up to 2.6 x 2.0 cm.    Xray Chest 1 View-  (12.27.23 @ 15:46) >  Persistent large left effusion.               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  He had recently been hospitalized for a CHF exacerbation from 11/22 to 11/27/23 when he left AMA.  Patient is known to be a poor historian and is unable to give a reliable history. He denies chest pain, palpitations, N/V, HA, or dizziness. He admits to occasional anxiety, SOB, constipation, and RUQ tenderness.  HE was transferred to [facility] for further medical management and to have a thoracic surgery evaluation for a possible PleurX catheter.      PAST MEDICAL & SURGICAL HISTORY:  Afib (on Eliquis)  B/L LE Blisters from HF exacerbation  CHF  CVA  HTN  COPD  No significant past surgical history    REVIEW OF SYSTEMS  CONSTITUTIONAL: No weakness, fevers or chills  EYES/ENT: No visual changes;  No vertigo or throat pain   NECK: No pain or stiffness  RESPIRATORY: +SOB  CARDIOVASCULAR: No chest pain or palpitations  GASTROINTESTINAL: No abdominal or epigastric pain. No nausea, vomiting, or hematemesis; No diarrhea or constipation. No melena or hematochezia.  GENITOURINARY: No dysuria, frequency or hematuria  NEUROLOGICAL: No numbness or weakness  SKIN: pruritic leg wounds from CHF exacerbation  All other review of systems is negative unless indicated above    MEDICATIONS  (At [facility]):  allopurinol 100 milliGRAM(s) Oral daily  apixaban 2.5 milliGRAM(s) Oral every 12 hours  aspirin enteric coated 81 milliGRAM(s) Oral daily  atorvastatin 40 milliGRAM(s) Oral at bedtime  bacitracin   Ointment 1 Application(s) Topical daily  budesonide 160 MICROgram(s)/formoterol 4.5 MICROgram(s) Inhaler 2 Puff(s) Inhalation two times a day  melatonin 3 milliGRAM(s) Oral at bedtime  metoprolol succinate ER 50 milliGRAM(s) Oral daily  pantoprazole    Tablet 40 milliGRAM(s) Oral before breakfast  polyethylene glycol 3350 17 Gram(s) Oral two times a day  senna 2 Tablet(s) Oral at bedtime  tiotropium 2.5 MICROgram(s) Inhaler 2 Puff(s) Inhalation daily    ALLERGIES  No Known Allergies    SOCIAL HISTORY:  Denies EtOH, Tobacco, Recreational drugs    FAMILY HISTORY:  Not contributory    PHYSICAL EXAM:  Vital Signs Last 24 Hrs  T(C): 36.4 (01 Jan 2024 20:43), Max: 36.4 (01 Jan 2024 05:15)  T(F): 97.5 (01 Jan 2024 20:43), Max: 97.6 (01 Jan 2024 05:15)  HR: 80 (01 Jan 2024 20:43) (60 - 82)  BP: 131/55 (01 Jan 2024 20:43) (117/69 - 136/76)  RR: 18 (01 Jan 2024 20:43) (16 - 18)  SpO2: 100% (01 Jan 2024 20:43) (92% - 100%) NC    Constitutional: Pt sitting in chair, awake and alert, NAD, NC in place  HEENT: EOMI, normal hearing, moist mucous membranes  Respiratory: nonlabored breathing, good air entry, decreased L breath sounds  Cardiovascular: S1S2, ,RR,   Gastrointestinal: BS+, soft, NT/ND, no guarding, no rebound  Extremities: 2+ pitting edema b/l LE, open LE blisters covered with bacitracin  Vascular: 2+ peripheral pulses  Neurological: AAOx3, no focal deficits    LABS:                        9.3    8.81  )-----------( 195      ( 01 Jan 2024 06:30 )             30.2     01-01    145  |  103  |  94<H>  ----------------------------<  106<H>  4.3   |  36<H>  |  1.54<H>    Ca    9.1      01 Jan 2024 06:30    TPro  7.5  /  Alb  2.6<L>  /  TBili  1.1  /  DBili  x   /  AST  316<H>  /  ALT  161<H>  /  AlkPhos  198<H>  01-01    Urinalysis Basic - ( 01 Jan 2024 06:30 )  Color: x / Appearance: x / SG: x / pH: x  Gluc: 106 mg/dL / Ketone: x  / Bili: x / Urobili: x   Blood: x / Protein: x / Nitrite: x   Leuk Esterase: x / RBC: x / WBC x   Sq Epi: x / Non Sq Epi: x / Bacteria: x    RADIOLOGY & ADDITIONAL STUDIES:  US Abdomen Upper Quadrant Right (01.01.24 @ 08:45) >  Findings suspicious for acute cholecystitis.  Echogenic right kidney, suggestive of renal parenchymal disease.    CT Chest No Cont (12.20.23 @ 11:11) >   Moderate left pleural effusion. Band type opacity is   identified within the right lower lobe, likely related to atelectasis.   Opacity within the lingular segment of the left upper lobe and left lower   lobe is consistent with atelectasis; however, underlying parenchymal   infiltrate/consolidation cannot be excluded. Mediastinal lymph nodes   identified measuring up to 2.6 x 2.0 cm.    Xray Chest 1 View-  (12.27.23 @ 15:46) >  Persistent large left effusion.               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  He had recently been hospitalized for a CHF exacerbation from 11/22 to 11/27/23 when he left AMA.  Patient is known to be a poor historian and is unable to give a reliable history. He denies chest pain, palpitations, N/V, HA, or dizziness. He admits to occasional anxiety, SOB, constipation, and RUQ tenderness.  HE was transferred to [facility] for further medical management and to have a thoracic surgery evaluation for a possible PleurX catheter.      PAST MEDICAL & SURGICAL HISTORY:  Afib (on Eliquis)  B/L LE Blisters from HF exacerbation  CHF  CVA  HTN  COPD  No significant past surgical history    REVIEW OF SYSTEMS  CONSTITUTIONAL: No weakness, fevers or chills  EYES/ENT: No visual changes;  No vertigo or throat pain   NECK: No pain or stiffness  RESPIRATORY: +SOB  CARDIOVASCULAR: No chest pain or palpitations  GASTROINTESTINAL: No abdominal or epigastric pain. No nausea, vomiting, or hematemesis; No diarrhea or constipation. No melena or hematochezia.  GENITOURINARY: No dysuria, frequency or hematuria  NEUROLOGICAL: No numbness or weakness  SKIN: pruritic leg wounds from CHF exacerbation  All other review of systems is negative unless indicated above    MEDICATIONS  (At [facility]):  allopurinol 100 milliGRAM(s) Oral daily  apixaban 2.5 milliGRAM(s) Oral every 12 hours  aspirin enteric coated 81 milliGRAM(s) Oral daily  atorvastatin 40 milliGRAM(s) Oral at bedtime  bacitracin   Ointment 1 Application(s) Topical daily  budesonide 160 MICROgram(s)/formoterol 4.5 MICROgram(s) Inhaler 2 Puff(s) Inhalation two times a day  melatonin 3 milliGRAM(s) Oral at bedtime  metoprolol succinate ER 50 milliGRAM(s) Oral daily  pantoprazole    Tablet 40 milliGRAM(s) Oral before breakfast  polyethylene glycol 3350 17 Gram(s) Oral two times a day  senna 2 Tablet(s) Oral at bedtime  tiotropium 2.5 MICROgram(s) Inhaler 2 Puff(s) Inhalation daily    ALLERGIES  No Known Allergies    SOCIAL HISTORY:  Denies EtOH, Tobacco, Recreational drugs    FAMILY HISTORY:  Not contributory    PHYSICAL EXAM:  Vital Signs Last 24 Hrs  T(C): 36.4 (01 Jan 2024 20:43), Max: 36.4 (01 Jan 2024 05:15)  T(F): 97.5 (01 Jan 2024 20:43), Max: 97.6 (01 Jan 2024 05:15)  HR: 80 (01 Jan 2024 20:43) (60 - 82)  BP: 131/55 (01 Jan 2024 20:43) (117/69 - 136/76)  RR: 18 (01 Jan 2024 20:43) (16 - 18)  SpO2: 100% (01 Jan 2024 20:43) (92% - 100%) NC    Constitutional: Pt sitting in chair, awake and alert, NAD, NC in place  HEENT: EOMI, normal hearing, moist mucous membranes  Respiratory: nonlabored breathing, good air entry, decreased L breath sounds  Cardiovascular: S1S2, ,RR,   Gastrointestinal: BS+, soft, NT/ND, no guarding, no rebound  Extremities: 2+ pitting edema b/l LE, open LE blisters covered with bacitracin  Vascular: 2+ peripheral pulses  Neurological: AAOx3, no focal deficits    LABS:                        9.3    8.81  )-----------( 195      ( 01 Jan 2024 06:30 )             30.2     01-01    145  |  103  |  94<H>  ----------------------------<  106<H>  4.3   |  36<H>  |  1.54<H>    Ca    9.1      01 Jan 2024 06:30    TPro  7.5  /  Alb  2.6<L>  /  TBili  1.1  /  DBili  x   /  AST  316<H>  /  ALT  161<H>  /  AlkPhos  198<H>  01-01    Urinalysis Basic - ( 01 Jan 2024 06:30 )  Color: x / Appearance: x / SG: x / pH: x  Gluc: 106 mg/dL / Ketone: x  / Bili: x / Urobili: x   Blood: x / Protein: x / Nitrite: x   Leuk Esterase: x / RBC: x / WBC x   Sq Epi: x / Non Sq Epi: x / Bacteria: x    RADIOLOGY & ADDITIONAL STUDIES:  US Abdomen Upper Quadrant Right (01.01.24 @ 08:45) >  Findings suspicious for acute cholecystitis.  Echogenic right kidney, suggestive of renal parenchymal disease.    CT Chest No Cont (12.20.23 @ 11:11) >   Moderate left pleural effusion. Band type opacity is   identified within the right lower lobe, likely related to atelectasis.   Opacity within the lingular segment of the left upper lobe and left lower   lobe is consistent with atelectasis; however, underlying parenchymal   infiltrate/consolidation cannot be excluded. Mediastinal lymph nodes   identified measuring up to 2.6 x 2.0 cm.    Xray Chest 1 View-  (12.27.23 @ 15:46) >  Persistent large left effusion.               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  He had recently been hospitalized for a CHF exacerbation from 11/22 to 11/27/23 when he left AMA.  Patient is known to be a poor historian and is unable to give a reliable history. He denies chest pain, palpitations, N/V, HA, or dizziness. He admits to occasional anxiety, SOB, constipation, and RUQ tenderness.  HE was transferred to [facility] for further medical management and to have a thoracic surgery evaluation for a possible PleurX catheter.      PAST MEDICAL & SURGICAL HISTORY:  Afib (on Eliquis)  B/L LE Blisters from HF exacerbation  CHF  CKD 3  CVA  HTN  COPD  No significant past surgical history    REVIEW OF SYSTEMS  CONSTITUTIONAL: No weakness, fevers or chills  EYES/ENT: No visual changes;  No vertigo or throat pain   NECK: No pain or stiffness  RESPIRATORY: +SOB  CARDIOVASCULAR: No chest pain or palpitations  GASTROINTESTINAL: No abdominal or epigastric pain. No nausea, vomiting, or hematemesis; No diarrhea or constipation. No melena or hematochezia.  GENITOURINARY: No dysuria, frequency or hematuria  NEUROLOGICAL: No numbness or weakness  SKIN: pruritic leg wounds from CHF exacerbation  All other review of systems is negative unless indicated above    MEDICATIONS  (At [facility]):  allopurinol 100 milliGRAM(s) Oral daily  apixaban 2.5 milliGRAM(s) Oral every 12 hours  aspirin enteric coated 81 milliGRAM(s) Oral daily  atorvastatin 40 milliGRAM(s) Oral at bedtime  bacitracin   Ointment 1 Application(s) Topical daily  budesonide 160 MICROgram(s)/formoterol 4.5 MICROgram(s) Inhaler 2 Puff(s) Inhalation two times a day  melatonin 3 milliGRAM(s) Oral at bedtime  metoprolol succinate ER 50 milliGRAM(s) Oral daily  pantoprazole    Tablet 40 milliGRAM(s) Oral before breakfast  polyethylene glycol 3350 17 Gram(s) Oral two times a day  senna 2 Tablet(s) Oral at bedtime  tiotropium 2.5 MICROgram(s) Inhaler 2 Puff(s) Inhalation daily    ALLERGIES  No Known Allergies    SOCIAL HISTORY:  Denies EtOH, Tobacco, Recreational drugs    FAMILY HISTORY:  Not contributory    PHYSICAL EXAM:  Vital Signs Last 24 Hrs  T(C): 36.4 (01 Jan 2024 20:43), Max: 36.4 (01 Jan 2024 05:15)  T(F): 97.5 (01 Jan 2024 20:43), Max: 97.6 (01 Jan 2024 05:15)  HR: 80 (01 Jan 2024 20:43) (60 - 82)  BP: 131/55 (01 Jan 2024 20:43) (117/69 - 136/76)  RR: 18 (01 Jan 2024 20:43) (16 - 18)  SpO2: 100% (01 Jan 2024 20:43) (92% - 100%) NC    Constitutional: Pt sitting in chair, awake and alert, NAD, NC in place  HEENT: EOMI, normal hearing, moist mucous membranes  Respiratory: nonlabored breathing, good air entry, decreased L breath sounds  Cardiovascular: S1S2, ,RR,   Gastrointestinal: BS+, soft, NT/ND, no guarding, no rebound  Extremities: 2+ pitting edema b/l LE, open LE blisters covered with bacitracin  Vascular: 2+ peripheral pulses  Neurological: AAOx3, no focal deficits    LABS:                        9.3    8.81  )-----------( 195      ( 01 Jan 2024 06:30 )             30.2     01-01    145  |  103  |  94<H>  ----------------------------<  106<H>  4.3   |  36<H>  |  1.54<H>    Ca    9.1      01 Jan 2024 06:30    TPro  7.5  /  Alb  2.6<L>  /  TBili  1.1  /  DBili  x   /  AST  316<H>  /  ALT  161<H>  /  AlkPhos  198<H>  01-01    Urinalysis Basic - ( 01 Jan 2024 06:30 )  Color: x / Appearance: x / SG: x / pH: x  Gluc: 106 mg/dL / Ketone: x  / Bili: x / Urobili: x   Blood: x / Protein: x / Nitrite: x   Leuk Esterase: x / RBC: x / WBC x   Sq Epi: x / Non Sq Epi: x / Bacteria: x    RADIOLOGY & ADDITIONAL STUDIES:  US Abdomen Upper Quadrant Right (01.01.24 @ 08:45) >  Findings suspicious for acute cholecystitis.  Echogenic right kidney, suggestive of renal parenchymal disease.    CT Chest No Cont (12.20.23 @ 11:11) >   Moderate left pleural effusion. Band type opacity is   identified within the right lower lobe, likely related to atelectasis.   Opacity within the lingular segment of the left upper lobe and left lower   lobe is consistent with atelectasis; however, underlying parenchymal   infiltrate/consolidation cannot be excluded. Mediastinal lymph nodes   identified measuring up to 2.6 x 2.0 cm.    Xray Chest 1 View-  (12.27.23 @ 15:46) >  Persistent large left effusion.               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  He had recently been hospitalized for a CHF exacerbation from 11/22 to 11/27/23 when he left AMA.  Patient is known to be a poor historian and is unable to give a reliable history. He denies chest pain, palpitations, N/V, HA, or dizziness. He admits to occasional anxiety, SOB, constipation, and RUQ tenderness.  HE was transferred to [facility] for further medical management and to have a thoracic surgery evaluation for a possible PleurX catheter.      PAST MEDICAL & SURGICAL HISTORY:  Afib (on Eliquis)  B/L LE Blisters from HF exacerbation  CHF  CKD 3  CVA  HTN  COPD  No significant past surgical history    REVIEW OF SYSTEMS  CONSTITUTIONAL: No weakness, fevers or chills  EYES/ENT: No visual changes;  No vertigo or throat pain   NECK: No pain or stiffness  RESPIRATORY: +SOB  CARDIOVASCULAR: No chest pain or palpitations  GASTROINTESTINAL: No abdominal or epigastric pain. No nausea, vomiting, or hematemesis; No diarrhea or constipation. No melena or hematochezia.  GENITOURINARY: No dysuria, frequency or hematuria  NEUROLOGICAL: No numbness or weakness  SKIN: pruritic leg wounds from CHF exacerbation  All other review of systems is negative unless indicated above    MEDICATIONS  (At [facility]):  allopurinol 100 milliGRAM(s) Oral daily  apixaban 2.5 milliGRAM(s) Oral every 12 hours  aspirin enteric coated 81 milliGRAM(s) Oral daily  atorvastatin 40 milliGRAM(s) Oral at bedtime  bacitracin   Ointment 1 Application(s) Topical daily  budesonide 160 MICROgram(s)/formoterol 4.5 MICROgram(s) Inhaler 2 Puff(s) Inhalation two times a day  melatonin 3 milliGRAM(s) Oral at bedtime  metoprolol succinate ER 50 milliGRAM(s) Oral daily  pantoprazole    Tablet 40 milliGRAM(s) Oral before breakfast  polyethylene glycol 3350 17 Gram(s) Oral two times a day  senna 2 Tablet(s) Oral at bedtime  tiotropium 2.5 MICROgram(s) Inhaler 2 Puff(s) Inhalation daily    ALLERGIES  No Known Allergies    SOCIAL HISTORY:  Denies EtOH, Tobacco, Recreational drugs    FAMILY HISTORY:  Not contributory    PHYSICAL EXAM:  Vital Signs Last 24 Hrs  T(C): 36.4 (01 Jan 2024 20:43), Max: 36.4 (01 Jan 2024 05:15)  T(F): 97.5 (01 Jan 2024 20:43), Max: 97.6 (01 Jan 2024 05:15)  HR: 80 (01 Jan 2024 20:43) (60 - 82)  BP: 131/55 (01 Jan 2024 20:43) (117/69 - 136/76)  RR: 18 (01 Jan 2024 20:43) (16 - 18)  SpO2: 100% (01 Jan 2024 20:43) (92% - 100%) NC    Constitutional: Pt sitting in chair, awake and alert, NAD, NC in place  HEENT: EOMI, normal hearing, moist mucous membranes  Respiratory: nonlabored breathing, good air entry, decreased L breath sounds  Cardiovascular: S1S2, ,RR,   Gastrointestinal: BS+, soft, NT/ND, no guarding, no rebound  Extremities: 2+ pitting edema b/l LE, open LE blisters covered with bacitracin  Vascular: 2+ peripheral pulses  Neurological: AAOx3, no focal deficits    LABS:                        9.3    8.81  )-----------( 195      ( 01 Jan 2024 06:30 )             30.2     01-01    145  |  103  |  94<H>  ----------------------------<  106<H>  4.3   |  36<H>  |  1.54<H>    Ca    9.1      01 Jan 2024 06:30    TPro  7.5  /  Alb  2.6<L>  /  TBili  1.1  /  DBili  x   /  AST  316<H>  /  ALT  161<H>  /  AlkPhos  198<H>  01-01    Urinalysis Basic - ( 01 Jan 2024 06:30 )  Color: x / Appearance: x / SG: x / pH: x  Gluc: 106 mg/dL / Ketone: x  / Bili: x / Urobili: x   Blood: x / Protein: x / Nitrite: x   Leuk Esterase: x / RBC: x / WBC x   Sq Epi: x / Non Sq Epi: x / Bacteria: x    RADIOLOGY & ADDITIONAL STUDIES:  US Abdomen Upper Quadrant Right (01.01.24 @ 08:45) >  Findings suspicious for acute cholecystitis.  Echogenic right kidney, suggestive of renal parenchymal disease.    CT Chest No Cont (12.20.23 @ 11:11) >   Moderate left pleural effusion. Band type opacity is   identified within the right lower lobe, likely related to atelectasis.   Opacity within the lingular segment of the left upper lobe and left lower   lobe is consistent with atelectasis; however, underlying parenchymal   infiltrate/consolidation cannot be excluded. Mediastinal lymph nodes   identified measuring up to 2.6 x 2.0 cm.    Xray Chest 1 View-  (12.27.23 @ 15:46) >  Persistent large left effusion.               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  He had recently been hospitalized for a CHF exacerbation from 11/22 to 11/27/23 when he left AMA.  Patient is known to be a poor historian and is unable to give a reliable history. He denies chest pain, palpitations, N/V, HA, or dizziness. He admits to occasional anxiety, SOB, constipation, and RUQ tenderness.  He was found to have a large L pleural effusion on CXR 12/27.  He had had a thoracentesis and now the effusion recurred.  He was transferred to [facility] for further medical management and to have a thoracic surgery evaluation for a possible PleurX catheter.      PAST MEDICAL & SURGICAL HISTORY:  Afib (on Eliquis)  B/L LE Blisters from HF exacerbation  CHF  CKD 3  CVA  HTN  COPD  No significant past surgical history    REVIEW OF SYSTEMS  CONSTITUTIONAL: No weakness, fevers or chills  EYES/ENT: No visual changes;  No vertigo or throat pain   NECK: No pain or stiffness  RESPIRATORY: +SOB  CARDIOVASCULAR: No chest pain or palpitations  GASTROINTESTINAL: No abdominal or epigastric pain. No nausea, vomiting, or hematemesis; No diarrhea or constipation. No melena or hematochezia.  GENITOURINARY: No dysuria, frequency or hematuria  NEUROLOGICAL: No numbness or weakness  SKIN: pruritic leg wounds from CHF exacerbation  All other review of systems is negative unless indicated above    [facility]  ([facility]  allopurinol 100 milliGRAM(s) Oral daily  apixaban 2.5 milliGRAM(s) Oral every 12 hours  aspirin enteric coated 81 milliGRAM(s) Oral daily  atorvastatin 40 milliGRAM(s) Oral at bedtime  bacitracin   Ointment 1 Application(s) Topical daily  budesonide 160 MICROgram(s)/formoterol 4.5 MICROgram(s) Inhaler 2 Puff(s) Inhalation two times a day  melatonin 3 milliGRAM(s) Oral at bedtime  metoprolol succinate ER 50 milliGRAM(s) Oral daily  pantoprazole    Tablet 40 milliGRAM(s) Oral before breakfast  polyethylene glycol 3350 17 Gram(s) Oral two times a day  senna 2 Tablet(s) Oral at bedtime  tiotropium 2.5 MICROgram(s) Inhaler 2 Puff(s) Inhalation daily    ALLERGIES  No Known Allergies    SOCIAL HISTORY:  Denies EtOH, Tobacco, Recreational drugs    FAMILY HISTORY:  Not contributory    PHYSICAL EXAM:  Vital Signs Last 24 Hrs  T(C): 36.4 (01 Jan 2024 20:43), Max: 36.4 (01 Jan 2024 05:15)  T(F): 97.5 (01 Jan 2024 20:43), Max: 97.6 (01 Jan 2024 05:15)  HR: 80 (01 Jan 2024 20:43) (60 - 82)  BP: 131/55 (01 Jan 2024 20:43) (117/69 - 136/76)  RR: 18 (01 Jan 2024 20:43) (16 - 18)  SpO2: 100% (01 Jan 2024 20:43) (92% - 100%) NC    Constitutional: Pt sitting in chair, awake and alert, NAD, NC in place  HEENT: EOMI, normal hearing, moist mucous membranes  Respiratory: nonlabored breathing, good air entry, decreased L breath sounds  Cardiovascular: S1S2, ,RR,   Gastrointestinal: BS+, soft, NT/ND, no guarding, no rebound  Extremities: 2+ pitting edema b/l LE, open LE blisters covered with bacitracin  Vascular: 2+ peripheral pulses  Neurological: AAOx3, no focal deficits    LABS:                        9.3    8.81  )-----------( 195      ( 01 Jan 2024 06:30 )             30.2     01-01    145  |  103  |  94<H>  ----------------------------<  106<H>  4.3   |  36<H>  |  1.54<H>    Ca    9.1      01 Jan 2024 06:30    TPro  7.5  /  Alb  2.6<L>  /  TBili  1.1  /  DBili  x   /  AST  316<H>  /  ALT  161<H>  /  AlkPhos  198<H>  01-01    Urinalysis Basic - ( 01 Jan 2024 06:30 )  Color: x / Appearance: x / SG: x / pH: x  Gluc: 106 mg/dL / Ketone: x  / Bili: x / Urobili: x   Blood: x / Protein: x / Nitrite: x   Leuk Esterase: x / RBC: x / WBC x   Sq Epi: x / Non Sq Epi: x / Bacteria: x    RADIOLOGY & ADDITIONAL STUDIES:  US Abdomen Upper Quadrant Right (01.01.24 @ 08:45) >  Findings suspicious for acute cholecystitis.  Echogenic right kidney, suggestive of renal parenchymal disease.    CT Chest No Cont (12.20.23 @ 11:11) >   Moderate left pleural effusion. Band type opacity is   identified within the right lower lobe, likely related to atelectasis.   Opacity within the lingular segment of the left upper lobe and left lower   lobe is consistent with atelectasis; however, underlying parenchymal   infiltrate/consolidation cannot be excluded. Mediastinal lymph nodes   identified measuring up to 2.6 x 2.0 cm.    Xray Chest 1 View-  (12.27.23 @ 15:46) >  Persistent large left effusion.               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  He had recently been hospitalized for a CHF exacerbation from 11/22 to 11/27/23 when he left AMA.  Patient is known to be a poor historian and is unable to give a reliable history. He denies chest pain, palpitations, N/V, HA, or dizziness. He admits to occasional anxiety, SOB, constipation, and RUQ tenderness.  He was found to have a large L pleural effusion on CXR 12/27.  He had had a thoracentesis and now the effusion recurred.  He was transferred to [facility] for further medical management and to have a thoracic surgery evaluation for a possible PleurX catheter.      PAST MEDICAL & SURGICAL HISTORY:  Afib (on Eliquis)  B/L LE Blisters from HF exacerbation  CHF  CKD 3  CVA  HTN  COPD  No significant past surgical history    REVIEW OF SYSTEMS  CONSTITUTIONAL: No weakness, fevers or chills  EYES/ENT: No visual changes;  No vertigo or throat pain   NECK: No pain or stiffness  RESPIRATORY: +SOB  CARDIOVASCULAR: No chest pain or palpitations  GASTROINTESTINAL: No abdominal or epigastric pain. No nausea, vomiting, or hematemesis; No diarrhea or constipation. No melena or hematochezia.  GENITOURINARY: No dysuria, frequency or hematuria  NEUROLOGICAL: No numbness or weakness  SKIN: pruritic leg wounds from CHF exacerbation  All other review of systems is negative unless indicated above    MEDICATIONS  (At  Hosp):  allopurinol 100 milliGRAM(s) Oral daily  apixaban 2.5 milliGRAM(s) Oral every 12 hours  aspirin enteric coated 81 milliGRAM(s) Oral daily  atorvastatin 40 milliGRAM(s) Oral at bedtime  bacitracin   Ointment 1 Application(s) Topical daily  budesonide 160 MICROgram(s)/formoterol 4.5 MICROgram(s) Inhaler 2 Puff(s) Inhalation two times a day  melatonin 3 milliGRAM(s) Oral at bedtime  metoprolol succinate ER 50 milliGRAM(s) Oral daily  pantoprazole    Tablet 40 milliGRAM(s) Oral before breakfast  polyethylene glycol 3350 17 Gram(s) Oral two times a day  senna 2 Tablet(s) Oral at bedtime  tiotropium 2.5 MICROgram(s) Inhaler 2 Puff(s) Inhalation daily    ALLERGIES  No Known Allergies    SOCIAL HISTORY:  Denies EtOH, Tobacco, Recreational drugs    FAMILY HISTORY:  Not contributory    PHYSICAL EXAM:  Vital Signs Last 24 Hrs  T(C): 36.4 (01 Jan 2024 20:43), Max: 36.4 (01 Jan 2024 05:15)  T(F): 97.5 (01 Jan 2024 20:43), Max: 97.6 (01 Jan 2024 05:15)  HR: 80 (01 Jan 2024 20:43) (60 - 82)  BP: 131/55 (01 Jan 2024 20:43) (117/69 - 136/76)  RR: 18 (01 Jan 2024 20:43) (16 - 18)  SpO2: 100% (01 Jan 2024 20:43) (92% - 100%) NC    Constitutional: Pt sitting in chair, awake and alert, NAD, NC in place  HEENT: EOMI, normal hearing, moist mucous membranes  Respiratory: nonlabored breathing, good air entry, decreased L breath sounds  Cardiovascular: S1S2, ,RR,   Gastrointestinal: BS+, soft, NT/ND, no guarding, no rebound  Extremities: 2+ pitting edema b/l LE, open LE blisters covered with bacitracin  Vascular: 2+ peripheral pulses  Neurological: AAOx3, no focal deficits    LABS:                        9.3    8.81  )-----------( 195      ( 01 Jan 2024 06:30 )             30.2     01-01    145  |  103  |  94<H>  ----------------------------<  106<H>  4.3   |  36<H>  |  1.54<H>    Ca    9.1      01 Jan 2024 06:30    TPro  7.5  /  Alb  2.6<L>  /  TBili  1.1  /  DBili  x   /  AST  316<H>  /  ALT  161<H>  /  AlkPhos  198<H>  01-01    Urinalysis Basic - ( 01 Jan 2024 06:30 )  Color: x / Appearance: x / SG: x / pH: x  Gluc: 106 mg/dL / Ketone: x  / Bili: x / Urobili: x   Blood: x / Protein: x / Nitrite: x   Leuk Esterase: x / RBC: x / WBC x   Sq Epi: x / Non Sq Epi: x / Bacteria: x    RADIOLOGY & ADDITIONAL STUDIES:  US Abdomen Upper Quadrant Right (01.01.24 @ 08:45) >  Findings suspicious for acute cholecystitis.  Echogenic right kidney, suggestive of renal parenchymal disease.    CT Chest No Cont (12.20.23 @ 11:11) >   Moderate left pleural effusion. Band type opacity is   identified within the right lower lobe, likely related to atelectasis.   Opacity within the lingular segment of the left upper lobe and left lower   lobe is consistent with atelectasis; however, underlying parenchymal   infiltrate/consolidation cannot be excluded. Mediastinal lymph nodes   identified measuring up to 2.6 x 2.0 cm.    Xray Chest 1 View-  (12.27.23 @ 15:46) >  Persistent large left effusion.               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  He had recently been hospitalized for a CHF exacerbation from 11/22 to 11/27/23 when he left AMA.  Patient is known to be a poor historian and is unable to give a reliable history. He denies chest pain, palpitations, N/V, HA, or dizziness. He admits to occasional anxiety, SOB, constipation, and RUQ tenderness.  He was found to have a recurrent  large L pleural effusion on CXR 12/27.  He had had a thoracentesis (1 L drained 12/19/23) and now the effusion recurred.  Path from 12/19 was neg for malignant cells and cultures showed no growth.  He was transferred to [facility] for further medical management and to have a thoracic surgery evaluation for a possible PleurX catheter.      PAST MEDICAL & SURGICAL HISTORY:  Afib (on Eliquis)  B/L LE Blisters from HF exacerbation  CHF  CKD 3  CVA  HTN  COPD  No significant past surgical history    REVIEW OF SYSTEMS  CONSTITUTIONAL: No weakness, fevers or chills  EYES/ENT: No visual changes;  No vertigo or throat pain   NECK: No pain or stiffness  RESPIRATORY: +SOB  CARDIOVASCULAR: No chest pain or palpitations  GASTROINTESTINAL: No abdominal or epigastric pain. No nausea, vomiting, or hematemesis; No diarrhea or constipation. No melena or hematochezia.  GENITOURINARY: No dysuria, frequency or hematuria  NEUROLOGICAL: No numbness or weakness  SKIN: pruritic leg wounds from CHF exacerbation  All other review of systems is negative unless indicated above    MEDICATIONS  (At [facility]):  allopurinol 100 milliGRAM(s) Oral daily  apixaban 2.5 milliGRAM(s) Oral every 12 hours  aspirin enteric coated 81 milliGRAM(s) Oral daily  atorvastatin 40 milliGRAM(s) Oral at bedtime  bacitracin   Ointment 1 Application(s) Topical daily  budesonide 160 MICROgram(s)/formoterol 4.5 MICROgram(s) Inhaler 2 Puff(s) Inhalation two times a day  melatonin 3 milliGRAM(s) Oral at bedtime  metoprolol succinate ER 50 milliGRAM(s) Oral daily  pantoprazole    Tablet 40 milliGRAM(s) Oral before breakfast  polyethylene glycol 3350 17 Gram(s) Oral two times a day  senna 2 Tablet(s) Oral at bedtime  tiotropium 2.5 MICROgram(s) Inhaler 2 Puff(s) Inhalation daily    ALLERGIES  No Known Allergies    SOCIAL HISTORY:  Denies EtOH, Tobacco, Recreational drugs    FAMILY HISTORY:  Not contributory    PHYSICAL EXAM:  Vital Signs Last 24 Hrs  T(C): 36.4 (01 Jan 2024 20:43), Max: 36.4 (01 Jan 2024 05:15)  T(F): 97.5 (01 Jan 2024 20:43), Max: 97.6 (01 Jan 2024 05:15)  HR: 80 (01 Jan 2024 20:43) (60 - 82)  BP: 131/55 (01 Jan 2024 20:43) (117/69 - 136/76)  RR: 18 (01 Jan 2024 20:43) (16 - 18)  SpO2: 100% (01 Jan 2024 20:43) (92% - 100%) NC    Constitutional: Pt sitting in chair, awake and alert, NAD, NC in place  HEENT: EOMI, normal hearing, moist mucous membranes  Respiratory: nonlabored breathing, good air entry, decreased L breath sounds  Cardiovascular: S1S2, ,RR,   Gastrointestinal: BS+, soft, NT/ND, no guarding, no rebound  Extremities: 2+ pitting edema b/l LE, open LE blisters covered with bacitracin  Vascular: 2+ peripheral pulses  Neurological: AAOx3, no focal deficits    LABS:                        9.3    8.81  )-----------( 195      ( 01 Jan 2024 06:30 )             30.2     01-01    145  |  103  |  94<H>  ----------------------------<  106<H>  4.3   |  36<H>  |  1.54<H>    Ca    9.1      01 Jan 2024 06:30    TPro  7.5  /  Alb  2.6<L>  /  TBili  1.1  /  DBili  x   /  AST  316<H>  /  ALT  161<H>  /  AlkPhos  198<H>  01-01    Urinalysis Basic - ( 01 Jan 2024 06:30 )  Color: x / Appearance: x / SG: x / pH: x  Gluc: 106 mg/dL / Ketone: x  / Bili: x / Urobili: x   Blood: x / Protein: x / Nitrite: x   Leuk Esterase: x / RBC: x / WBC x   Sq Epi: x / Non Sq Epi: x / Bacteria: x    RADIOLOGY & ADDITIONAL STUDIES:  US Abdomen Upper Quadrant Right (01.01.24 @ 08:45) >  Findings suspicious for acute cholecystitis.  Echogenic right kidney, suggestive of renal parenchymal disease.    CT Chest No Cont (12.20.23 @ 11:11) >   Moderate left pleural effusion. Band type opacity is   identified within the right lower lobe, likely related to atelectasis.   Opacity within the lingular segment of the left upper lobe and left lower   lobe is consistent with atelectasis; however, underlying parenchymal   infiltrate/consolidation cannot be excluded. Mediastinal lymph nodes   identified measuring up to 2.6 x 2.0 cm.    Xray Chest 1 View-  (12.27.23 @ 15:46) >  Persistent large left effusion.               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  He had recently been hospitalized for a CHF exacerbation from 11/22 to 11/27/23 when he left AMA.  Patient is known to be a poor historian and is unable to give a reliable history. He denies chest pain, palpitations, N/V, HA, or dizziness. He admits to occasional anxiety, SOB, constipation, and RUQ tenderness.  He was found to have a recurrent  large L pleural effusion on CXR 12/27.  He had had a thoracentesis (1 L drained 12/19/23) and now the effusion recurred.  Path from 12/19 was neg for malignant cells and cultures showed no growth.  He was transferred to [facility] for further medical management and to have a thoracic surgery evaluation for a possible PleurX catheter.      PAST MEDICAL & SURGICAL HISTORY:  Afib (on Eliquis)  B/L LE Blisters from HF exacerbation  CHF  CKD 3  CVA  HTN  COPD  No significant past surgical history    REVIEW OF SYSTEMS  CONSTITUTIONAL: No weakness, fevers or chills  EYES/ENT: No visual changes;  No vertigo or throat pain   NECK: No pain or stiffness  RESPIRATORY: +SOB  CARDIOVASCULAR: No chest pain or palpitations  GASTROINTESTINAL: No abdominal or epigastric pain. No nausea, vomiting, or hematemesis; No diarrhea or constipation. No melena or hematochezia.  GENITOURINARY: No dysuria, frequency or hematuria  NEUROLOGICAL: No numbness or weakness  SKIN: pruritic leg wounds from CHF exacerbation  All other review of systems is negative unless indicated above    MEDICATIONS  (At [facility]):  allopurinol 100 milliGRAM(s) Oral daily  apixaban 2.5 milliGRAM(s) Oral every 12 hours  aspirin enteric coated 81 milliGRAM(s) Oral daily  atorvastatin 40 milliGRAM(s) Oral at bedtime  bacitracin   Ointment 1 Application(s) Topical daily  budesonide 160 MICROgram(s)/formoterol 4.5 MICROgram(s) Inhaler 2 Puff(s) Inhalation two times a day  melatonin 3 milliGRAM(s) Oral at bedtime  metoprolol succinate ER 50 milliGRAM(s) Oral daily  pantoprazole    Tablet 40 milliGRAM(s) Oral before breakfast  polyethylene glycol 3350 17 Gram(s) Oral two times a day  senna 2 Tablet(s) Oral at bedtime  tiotropium 2.5 MICROgram(s) Inhaler 2 Puff(s) Inhalation daily    ALLERGIES  No Known Allergies    SOCIAL HISTORY:  Denies EtOH, Tobacco, Recreational drugs    FAMILY HISTORY:  Not contributory    PHYSICAL EXAM:  Vital Signs Last 24 Hrs  T(C): 36.4 (01 Jan 2024 20:43), Max: 36.4 (01 Jan 2024 05:15)  T(F): 97.5 (01 Jan 2024 20:43), Max: 97.6 (01 Jan 2024 05:15)  HR: 80 (01 Jan 2024 20:43) (60 - 82)  BP: 131/55 (01 Jan 2024 20:43) (117/69 - 136/76)  RR: 18 (01 Jan 2024 20:43) (16 - 18)  SpO2: 100% (01 Jan 2024 20:43) (92% - 100%) NC    Constitutional: Pt sitting in chair, awake and alert, NAD, NC in place  HEENT: EOMI, normal hearing, moist mucous membranes  Respiratory: nonlabored breathing, good air entry, decreased L breath sounds  Cardiovascular: S1S2, ,RR,   Gastrointestinal: BS+, soft, NT/ND, no guarding, no rebound  Extremities: 2+ pitting edema b/l LE, open LE blisters covered with bacitracin  Vascular: 2+ peripheral pulses  Neurological: AAOx3, no focal deficits    LABS:                        9.3    8.81  )-----------( 195      ( 01 Jan 2024 06:30 )             30.2     01-01    145  |  103  |  94<H>  ----------------------------<  106<H>  4.3   |  36<H>  |  1.54<H>    Ca    9.1      01 Jan 2024 06:30    TPro  7.5  /  Alb  2.6<L>  /  TBili  1.1  /  DBili  x   /  AST  316<H>  /  ALT  161<H>  /  AlkPhos  198<H>  01-01    Urinalysis Basic - ( 01 Jan 2024 06:30 )  Color: x / Appearance: x / SG: x / pH: x  Gluc: 106 mg/dL / Ketone: x  / Bili: x / Urobili: x   Blood: x / Protein: x / Nitrite: x   Leuk Esterase: x / RBC: x / WBC x   Sq Epi: x / Non Sq Epi: x / Bacteria: x    RADIOLOGY & ADDITIONAL STUDIES:  US Abdomen Upper Quadrant Right (01.01.24 @ 08:45) >  Findings suspicious for acute cholecystitis.  Echogenic right kidney, suggestive of renal parenchymal disease.    CT Chest No Cont (12.20.23 @ 11:11) >   Moderate left pleural effusion. Band type opacity is   identified within the right lower lobe, likely related to atelectasis.   Opacity within the lingular segment of the left upper lobe and left lower   lobe is consistent with atelectasis; however, underlying parenchymal   infiltrate/consolidation cannot be excluded. Mediastinal lymph nodes   identified measuring up to 2.6 x 2.0 cm.    Xray Chest 1 View-  (12.27.23 @ 15:46) >  Persistent large left effusion.               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  He had recently been hospitalized for a CHF exacerbation from 11/22 to 11/27/23 when he left AMA.  Patient is known to be a poor historian and is unable to give a reliable history. He denies chest pain, palpitations, N/V, HA, or dizziness. He admits to occasional anxiety, SOB, constipation, and RUQ tenderness.  He was found to have a recurrent  large L pleural effusion on CXR 12/27.  He had had a thoracentesis (1 L drained 12/19/23) and now the effusion recurred.  Path from 12/19 was neg for malignant cells and cultures showed no growth.  Pulmonology recommended the PleurX while cardiology recommended diuresis.  He was transferred to [facility] for further medical management and to have a thoracic surgery evaluation for a possible PleurX catheter.  On the day of transfer, an abd US showed acute cholecystitis but the pt is a poor surgical candidate and a cholecystostomy is being considered.      PAST MEDICAL & SURGICAL HISTORY:  Afib (on Eliquis)  B/L LE Blisters from HF exacerbation  Currently acute cholecystitis   CHF  CKD 3  CVA  HTN  COPD  No significant past surgical history    REVIEW OF SYSTEMS  CONSTITUTIONAL: No weakness, fevers or chills  EYES/ENT: No visual changes;  No vertigo or throat pain   NECK: No pain or stiffness  RESPIRATORY: +SOB  CARDIOVASCULAR: No chest pain or palpitations  GASTROINTESTINAL: No abdominal or epigastric pain. No nausea, vomiting, or hematemesis; No diarrhea or constipation. No melena or hematochezia.  GENITOURINARY: No dysuria, frequency or hematuria  NEUROLOGICAL: No numbness or weakness  SKIN: pruritic leg wounds from CHF exacerbation  All other review of systems is negative unless indicated above    MEDICATIONS  (At [facility]):  allopurinol 100 milliGRAM(s) Oral daily  apixaban 2.5 milliGRAM(s) Oral every 12 hours  aspirin enteric coated 81 milliGRAM(s) Oral daily  atorvastatin 40 milliGRAM(s) Oral at bedtime  bacitracin   Ointment 1 Application(s) Topical daily  budesonide 160 MICROgram(s)/formoterol 4.5 MICROgram(s) Inhaler 2 Puff(s) Inhalation two times a day  melatonin 3 milliGRAM(s) Oral at bedtime  metoprolol succinate ER 50 milliGRAM(s) Oral daily  pantoprazole    Tablet 40 milliGRAM(s) Oral before breakfast  polyethylene glycol 3350 17 Gram(s) Oral two times a day  senna 2 Tablet(s) Oral at bedtime  tiotropium 2.5 MICROgram(s) Inhaler 2 Puff(s) Inhalation daily    ALLERGIES  No Known Allergies    SOCIAL HISTORY:  Denies EtOH, Tobacco, Recreational drugs    FAMILY HISTORY:  Not contributory    PHYSICAL EXAM:  Vital Signs Last 24 Hrs  T(C): 36.4 (01 Jan 2024 20:43), Max: 36.4 (01 Jan 2024 05:15)  T(F): 97.5 (01 Jan 2024 20:43), Max: 97.6 (01 Jan 2024 05:15)  HR: 80 (01 Jan 2024 20:43) (60 - 82)  BP: 131/55 (01 Jan 2024 20:43) (117/69 - 136/76)  RR: 18 (01 Jan 2024 20:43) (16 - 18)  SpO2: 100% (01 Jan 2024 20:43) (92% - 100%) NC    Constitutional: Pt sitting in chair, awake and alert, NAD, NC in place  HEENT: EOMI, normal hearing, moist mucous membranes  Respiratory: nonlabored breathing, good air entry, decreased L breath sounds  Cardiovascular: S1S2, ,RR,   Gastrointestinal: BS+, soft, NT/ND, no guarding, no rebound  Extremities: 2+ pitting edema b/l LE, open LE blisters covered with bacitracin  Vascular: 2+ peripheral pulses  Neurological: AAOx3, no focal deficits    LABS:                        9.3    8.81  )-----------( 195      ( 01 Jan 2024 06:30 )             30.2     01-01    145  |  103  |  94<H>  ----------------------------<  106<H>  4.3   |  36<H>  |  1.54<H>    Ca    9.1      01 Jan 2024 06:30    TPro  7.5  /  Alb  2.6<L>  /  TBili  1.1  /  DBili  x   /  AST  316<H>  /  ALT  161<H>  /  AlkPhos  198<H>  01-01    Urinalysis Basic - ( 01 Jan 2024 06:30 )  Color: x / Appearance: x / SG: x / pH: x  Gluc: 106 mg/dL / Ketone: x  / Bili: x / Urobili: x   Blood: x / Protein: x / Nitrite: x   Leuk Esterase: x / RBC: x / WBC x   Sq Epi: x / Non Sq Epi: x / Bacteria: x    RADIOLOGY & ADDITIONAL STUDIES:  US Abdomen Upper Quadrant Right (01.01.24 @ 08:45) >  Findings suspicious for acute cholecystitis.  Echogenic right kidney, suggestive of renal parenchymal disease.    CT Chest No Cont (12.20.23 @ 11:11) >   Moderate left pleural effusion. Band type opacity is   identified within the right lower lobe, likely related to atelectasis.   Opacity within the lingular segment of the left upper lobe and left lower   lobe is consistent with atelectasis; however, underlying parenchymal   infiltrate/consolidation cannot be excluded. Mediastinal lymph nodes   identified measuring up to 2.6 x 2.0 cm.    Xray Chest 1 View-  (12.27.23 @ 15:46) >  Persistent large left effusion.               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  He had recently been hospitalized for a CHF exacerbation from 11/22 to 11/27/23 when he left AMA.  Patient is known to be a poor historian and is unable to give a reliable history. He denies chest pain, palpitations, N/V, HA, or dizziness. He admits to occasional anxiety, SOB, constipation, and RUQ tenderness.  He was found to have a recurrent  large L pleural effusion on CXR 12/27.  He had had a thoracentesis (1 L drained 12/19/23) and now the effusion recurred.  Path from 12/19 was neg for malignant cells and cultures showed no growth.  Pulmonology recommended the PleurX while cardiology recommended diuresis.  He was transferred to [facility] for further medical management and to have a thoracic surgery evaluation for a possible PleurX catheter.  On the day of transfer, an abd US showed acute cholecystitis but the pt is a poor surgical candidate and a cholecystostomy is being considered.      PAST MEDICAL & SURGICAL HISTORY:  Afib (on Eliquis)  B/L LE Blisters from HF exacerbation  Currently acute cholecystitis   CHF  CKD 3  CVA  HTN  COPD  No significant past surgical history    REVIEW OF SYSTEMS  CONSTITUTIONAL: No weakness, fevers or chills  EYES/ENT: No visual changes;  No vertigo or throat pain   NECK: No pain or stiffness  RESPIRATORY: +SOB  CARDIOVASCULAR: No chest pain or palpitations  GASTROINTESTINAL: No abdominal or epigastric pain. No nausea, vomiting, or hematemesis; No diarrhea or constipation. No melena or hematochezia.  GENITOURINARY: No dysuria, frequency or hematuria  NEUROLOGICAL: No numbness or weakness  SKIN: pruritic leg wounds from CHF exacerbation  All other review of systems is negative unless indicated above    MEDICATIONS  (At [facility]):  allopurinol 100 milliGRAM(s) Oral daily  apixaban 2.5 milliGRAM(s) Oral every 12 hours  aspirin enteric coated 81 milliGRAM(s) Oral daily  atorvastatin 40 milliGRAM(s) Oral at bedtime  bacitracin   Ointment 1 Application(s) Topical daily  budesonide 160 MICROgram(s)/formoterol 4.5 MICROgram(s) Inhaler 2 Puff(s) Inhalation two times a day  melatonin 3 milliGRAM(s) Oral at bedtime  metoprolol succinate ER 50 milliGRAM(s) Oral daily  pantoprazole    Tablet 40 milliGRAM(s) Oral before breakfast  polyethylene glycol 3350 17 Gram(s) Oral two times a day  senna 2 Tablet(s) Oral at bedtime  tiotropium 2.5 MICROgram(s) Inhaler 2 Puff(s) Inhalation daily    ALLERGIES  No Known Allergies    SOCIAL HISTORY:  Denies EtOH, Tobacco, Recreational drugs    FAMILY HISTORY:  Not contributory    PHYSICAL EXAM:  Vital Signs Last 24 Hrs  T(C): 36.4 (01 Jan 2024 20:43), Max: 36.4 (01 Jan 2024 05:15)  T(F): 97.5 (01 Jan 2024 20:43), Max: 97.6 (01 Jan 2024 05:15)  HR: 80 (01 Jan 2024 20:43) (60 - 82)  BP: 131/55 (01 Jan 2024 20:43) (117/69 - 136/76)  RR: 18 (01 Jan 2024 20:43) (16 - 18)  SpO2: 100% (01 Jan 2024 20:43) (92% - 100%) NC    Constitutional: Pt sitting in chair, awake and alert, NAD, NC in place  HEENT: EOMI, normal hearing, moist mucous membranes  Respiratory: nonlabored breathing, good air entry, decreased L breath sounds  Cardiovascular: S1S2, ,RR,   Gastrointestinal: BS+, soft, NT/ND, no guarding, no rebound  Extremities: 2+ pitting edema b/l LE, open LE blisters covered with bacitracin  Vascular: 2+ peripheral pulses  Neurological: AAOx3, no focal deficits    LABS:                        9.3    8.81  )-----------( 195      ( 01 Jan 2024 06:30 )             30.2     01-01    145  |  103  |  94<H>  ----------------------------<  106<H>  4.3   |  36<H>  |  1.54<H>    Ca    9.1      01 Jan 2024 06:30    TPro  7.5  /  Alb  2.6<L>  /  TBili  1.1  /  DBili  x   /  AST  316<H>  /  ALT  161<H>  /  AlkPhos  198<H>  01-01    Urinalysis Basic - ( 01 Jan 2024 06:30 )  Color: x / Appearance: x / SG: x / pH: x  Gluc: 106 mg/dL / Ketone: x  / Bili: x / Urobili: x   Blood: x / Protein: x / Nitrite: x   Leuk Esterase: x / RBC: x / WBC x   Sq Epi: x / Non Sq Epi: x / Bacteria: x    RADIOLOGY & ADDITIONAL STUDIES:  US Abdomen Upper Quadrant Right (01.01.24 @ 08:45) >  Findings suspicious for acute cholecystitis.  Echogenic right kidney, suggestive of renal parenchymal disease.    CT Chest No Cont (12.20.23 @ 11:11) >   Moderate left pleural effusion. Band type opacity is   identified within the right lower lobe, likely related to atelectasis.   Opacity within the lingular segment of the left upper lobe and left lower   lobe is consistent with atelectasis; however, underlying parenchymal   infiltrate/consolidation cannot be excluded. Mediastinal lymph nodes   identified measuring up to 2.6 x 2.0 cm.    Xray Chest 1 View-  (12.27.23 @ 15:46) >  Persistent large left effusion.

## 2024-01-02 NOTE — DIETITIAN INITIAL EVALUATION ADULT - EDUCATION DIETARY MODIFICATIONS
Patient is feeling unwell when RD visited, please reinforce fluid restriction./(1) partially meets; needs review/practice/verbalization

## 2024-01-02 NOTE — H&P ADULT - NSHPPHYSICALEXAM_GEN_ALL_CORE
GENERAL: Sitting in chair, dyspneic  EYES: Conjunctiva noninjected or pale, sclera anicteric  HENT: NC/AT, moist mucous membranes  NECK: Supple, trachea midline  LUNG: Nonlabored respirations, no wheezes, rales  CV: RRR, Pulses- Radial: 2+ b/l  ABDOMEN: Soft, distended, nontender  MSK: Pitting edema up to 3-4 inches distal to b/l knees. Many scabbed lacerations, some weeping blisters  SKIN: No rashes, bruises  NEURO: AAOx4 (to person, place, time, event), no tremor  PSYCH: Normal mood and affect

## 2024-01-02 NOTE — DIETITIAN INITIAL EVALUATION ADULT - PROBLEM SELECTOR PLAN 3
- Surgery consult  - Ceftriaxone/flagyl   - Blood cultures x2, urine culture  - Lipase in AM  - Avoid opioids

## 2024-01-02 NOTE — PHYSICAL THERAPY INITIAL EVALUATION ADULT - ADDITIONAL COMMENTS
Pt lives with family in a private house with +steps to enter. Pt was independent with all ADLs and ambulated with a rolling walker at baseline.     Pt left seated in chair in NAD, all lines intact, call bell in reach, +NC at 2L, SPO2 95% and TAYO Cohen aware.

## 2024-01-02 NOTE — DIETITIAN INITIAL EVALUATION ADULT - PERTINENT MEDS FT
MEDICATIONS  (STANDING):  allopurinol 100 milliGRAM(s) Oral daily  aspirin enteric coated 81 milliGRAM(s) Oral daily  atorvastatin 40 milliGRAM(s) Oral at bedtime  bacitracin   Ointment 1 Application(s) Topical daily  budesonide 160 MICROgram(s)/formoterol 4.5 MICROgram(s) Inhaler 2 Puff(s) Inhalation two times a day  dapagliflozin 10 milliGRAM(s) Oral daily  furosemide   Injectable 40 milliGRAM(s) IV Push every 12 hours  heparin   Injectable 5000 Unit(s) SubCutaneous every 12 hours  melatonin 3 milliGRAM(s) Oral at bedtime  metoprolol succinate ER 50 milliGRAM(s) Oral daily  pantoprazole    Tablet 40 milliGRAM(s) Oral before breakfast  polyethylene glycol 3350 17 Gram(s) Oral two times a day  senna 2 Tablet(s) Oral at bedtime  tiotropium 2.5 MICROgram(s) Inhaler 2 Puff(s) Inhalation daily    MEDICATIONS  (PRN):  acetaminophen     Tablet .. 650 milliGRAM(s) Oral every 6 hours PRN Temp greater or equal to 38C (100.4F), Mild Pain (1 - 3)  albuterol    90 MICROgram(s) HFA Inhaler 2 Puff(s) Inhalation every 6 hours PRN Shortness of Breath and/or Wheezing  aluminum hydroxide/magnesium hydroxide/simethicone Suspension 30 milliLiter(s) Oral every 4 hours PRN Dyspepsia  ondansetron Injectable 4 milliGRAM(s) IV Push every 8 hours PRN Nausea and/or Vomiting

## 2024-01-02 NOTE — DIETITIAN INITIAL EVALUATION ADULT - NS FNS DIET ORDER
Diet, NPO after Midnight:      NPO Start Date: 02-Jan-2024,   NPO Start Time: 23:59  Except Medications  With Ice Chips/Sips of Water (01-02-24 @ 14:59)  Diet, Regular:   Low Sodium  Piero(7 Gm Arginine/7 Gm Glut/1.2 Gm HMB     Qty per Day:  2  Supplement Feeding Modality:  Oral  Ensure Plus High Protein Cans or Servings Per Day:  1       Frequency:  Daily (01-02-24 @ 10:36)

## 2024-01-02 NOTE — DIETITIAN INITIAL EVALUATION ADULT - OTHER CALCULATIONS
Based on IBW @ 148 lbs +/-10%, Patient is currently on 1000ml fluid restriction daily per MD and team.

## 2024-01-02 NOTE — H&P ADULT - PROBLEM SELECTOR PLAN 9
- BLE open blisters not improving  - Wound care recs appreaciated: adaptic dressing applied to wounds and changed every 3 days. Use bacitracin everyday over the wounds and ACE wrap both legs to help decrease swelling  - Cont wound care.

## 2024-01-02 NOTE — DIETITIAN INITIAL EVALUATION ADULT - PERTINENT LABORATORY DATA
01-02    143  |  100  |  86<H>  ----------------------------<  83  4.3   |  32<H>  |  1.56<H>    Ca    8.8      02 Jan 2024 08:51  Phos  4.5     01-02  Mg     3.00     01-02    TPro  7.7  /  Alb  3.5  /  TBili  0.8  /  DBili  x   /  AST  197<H>  /  ALT  165<H>  /  AlkPhos  216<H>  01-02  A1C with Estimated Average Glucose Result: 5.8 % (09-20-23 @ 09:53)  A1C with Estimated Average Glucose Result: 5.8 % (09-19-23 @ 07:28)

## 2024-01-02 NOTE — PROGRESS NOTE ADULT - PROBLEM SELECTOR PLAN 2
- S/P thoracentesis with 1 L removal (described as "blood") 12/19 - exudative, large amount of RBCs noted  - Cytopath negative for malignancy  - Transferred to [facility] for thoracic evaluation for pleurX catheter and pleuroscopy; seen by CT surgery and will re-evaluate in AM  - Hold AC iso ?hemothorax - S/P thoracentesis with 1 L removal (described as "blood") 12/19 - exudative, large amount of RBCs noted  - Cytopath negative for malignancy  - Transferred to [facility] for thoracic evaluation for pleurX catheter and pleuroscopy; seen by CT surgery and will re-evaluate in AM  - Hold AC iso ?hemothorax - S/P thoracentesis with 1 L removal (described as "blood") 12/19 - exudative, large amount of RBCs noted  - Cytopath negative for malignancy  - Transferred to [facility] for thoracic evaluation for pleurX catheter and pleuroscopy; seen by CT surgery   - pending thoracic surg recs  - Hold AC iso ?hemothorax - S/P thoracentesis with 1 L removal (described as "blood") 12/19 - exudative, large amount of RBCs noted  - Cytopath negative for malignancy  - Transferred to [facility] for thoracic evaluation for pleurX catheter and pleuroscopy; seen by CT surgery   - pending thoracic surg recs  - Hold AC iso ?hemothorax - S/P thoracentesis with 1 L removal (described as "blood") 12/19 - exudative, large amount of RBCs noted  - Cytopath negative for malignancy  - Transferred to [facility] for thoracic evaluation for pleurX catheter and pleuroscopy; seen by CT surgery   - pending thoracic surg recs  - Hold AC iso possible procedures - S/P thoracentesis with 1 L removal (described as "blood") 12/19 - exudative, large amount of RBCs noted  - Cytopath negative for malignancy  - Transferred to [facility] for thoracic evaluation for pleurX catheter and pleuroscopy; seen by CT surgery   - pending thoracic surg recs  - Hold AC iso possible procedures

## 2024-01-02 NOTE — PROGRESS NOTE ADULT - ASSESSMENT
Mr. Antony is an 89 YO man with PMH of afib on Eliquis, b/l Le blisters, CHF (EF 45-50%, combined systolic and diastolic), CVA, HTN, COPD, CKD3, recent hospitalization for CHF exacerbation 11/22-11/27 where he left AMA, presenting with concern for LE blisters, found to have acute on chronic CHF, large pleural effusion s/p 1L removal, transferred from  for pleurX placement.

## 2024-01-02 NOTE — CONSULT NOTE ADULT - SUBJECTIVE AND OBJECTIVE BOX
Wound SURGERY CONSULT NOTE    HPI:Mr. Antony is an 88 YO man with PMH of afib on Eliquis, b/l Le blisters, CHF (EF 45-50%, combined systolic and diastolic), CVA, HTN, COPD, CKD3, recent hospitalization for CHF exacerbation 11/22-11/27 where he left AMA, presenting with concern for LE blisters. He notes constipation and loss of appetite. In [place], patient was found to have CT with large left sided loculated effusion, cardiomegaly with BNP 2.8K. Echo showed LVEF 50-55%, moderate mitral stenosis. He was hypoxic on RA to 89%. Cardiology was consulted and recommended diuresis. Pulmonology was consulted for thoracentesis, which was done and 1L removed. His symptoms improved and pulmonology recommended transfer to [facility] for placement of pleurX with thoracic surgery. On the day of transfer was noted to have cholecystitis on RUQ ultrasound. Surgery was consulted and felt that patient is a poor surgical candidate, may benefit from a cholecystostomy drain. On arrival, patient still with shortness of breath, unable to lie flat on his back. He experiences chest pain if he moves when standing. He is constipated, last BM yesterday. He has had a "normal" amount of urine output.  (02 Jan 2024 02:31)    Wound consult requested to assist w/ management of  HF with edema, overlying blisters to bilateral lower legs. Patient endorses new wounds to bilateral legs over the past week during hospital stay at [place]. Endorses he first developed a " water blister:" to left medial lower leg and more recently to right medial lower leg. Patient endorses right medial knee " bulging" since 1880s. Denies smoking cigarettes reports he smokes cigars. Reports throbbing and itching to bilateral lower legs. Denies significant pain to legs. Patient walks with walker.      Current Diet: Diet, Regular:   1000mL Fluid Restriction ([id number])  Low Sodium  Piero(7 Gm Arginine/7 Gm Glut/1.2 Gm HMB     Qty per Day:  2  Supplement Feeding Modality:  Oral  Ensure Plus High Protein Cans or Servings Per Day:  1       Frequency:  Daily (01-02-24 @ 03:04)      PAST MEDICAL & SURGICAL HISTORY:  Afib      Congestive heart failure (CHF)      CVA (cerebral vascular accident)      Hypertension, unspecified type      Chronic obstructive pulmonary disease (COPD)      No significant past surgical history      REVIEW OF SYSTEMS:   General/ Skin/ MSK: see HPI  All other systems negative    MEDICATIONS  (STANDING):  allopurinol 100 milliGRAM(s) Oral daily  aspirin enteric coated 81 milliGRAM(s) Oral daily  atorvastatin 40 milliGRAM(s) Oral at bedtime  bacitracin   Ointment 1 Application(s) Topical daily  budesonide 160 MICROgram(s)/formoterol 4.5 MICROgram(s) Inhaler 2 Puff(s) Inhalation two times a day  cefTRIAXone   IVPB 1000 milliGRAM(s) IV Intermittent every 24 hours  dapagliflozin 10 milliGRAM(s) Oral daily  furosemide   Injectable 40 milliGRAM(s) IV Push daily  melatonin 3 milliGRAM(s) Oral at bedtime  metoprolol succinate ER 50 milliGRAM(s) Oral daily  metroNIDAZOLE    Tablet 500 milliGRAM(s) Oral every 8 hours  pantoprazole    Tablet 40 milliGRAM(s) Oral before breakfast  polyethylene glycol 3350 17 Gram(s) Oral two times a day  senna 2 Tablet(s) Oral at bedtime  tiotropium 2.5 MICROgram(s) Inhaler 2 Puff(s) Inhalation daily    MEDICATIONS  (PRN):  acetaminophen     Tablet .. 650 milliGRAM(s) Oral every 6 hours PRN Temp greater or equal to 38C (100.4F), Mild Pain (1 - 3)  albuterol    90 MICROgram(s) HFA Inhaler 2 Puff(s) Inhalation every 6 hours PRN Shortness of Breath and/or Wheezing  aluminum hydroxide/magnesium hydroxide/simethicone Suspension 30 milliLiter(s) Oral every 4 hours PRN Dyspepsia  ondansetron Injectable 4 milliGRAM(s) IV Push every 8 hours PRN Nausea and/or Vomiting      Allergies    No Known Allergies    Intolerances    SOCIAL HISTORY:     FAMILY HISTORY:      PHYSICAL EXAM:  Vital Signs Last 24 Hrs  T(C): 36.7 (02 Jan 2024 05:23), Max: 36.7 (02 Jan 2024 05:23)  T(F): 98 (02 Jan 2024 05:23), Max: 98 (02 Jan 2024 05:23)  HR: 113 (02 Jan 2024 05:23) (64 - 113)  BP: 121/77 (02 Jan 2024 05:23) (121/51 - 136/76)  BP(mean): --  RR: 18 (02 Jan 2024 05:23) (16 - 18)  SpO2: 100% (02 Jan 2024 05:23) (94% - 100%)    Parameters below as of 02 Jan 2024 05:23  Patient On (Oxygen Delivery Method): nasal cannula  O2 Flow (L/min): 2      Weight (kg): 70.3 (18 Dec 2023 09:52)  BMI (kg/m2): 25 (18 Dec 2023 09:52)      Constitutional: NAD / gaurded but stable,  A&Ox3/ Alert/ Confused  cachectic/ MO/ Obese/ frail  WD/ WN/ WG/ Disheveled  (+) low airloss support surface, (+) fluidized postioining devices, (+) complete cair boots     HEENT:  NC/AT, PERRL, EOMI, mucosa moist, throat clear, trachea midline, neck supple  Cardiovascular: RRR   Respiratory: CTA  Gastrointestinal soft NT/ND (+)BS  (+)PEG (+)ostomy  Neurology  weakened strength & sensation grossly intact/ paraesthesia  nonverbal, no follow commands/ paraplegic    Musculoskeletal:  limited/ FROM, no deformities/ contractures    Vascular: BLE equally warm/ cool,  no cyanosis, clubbing, edema  >LE //BLE edema equal  DP/PT pulses palpable  no acute ischemia noted  hemosiderin staining  Skin:  moist w/ good turgor  frail,  ecchymosis w/o hematoma  Moisture associated dermatitis in gluteal cleft anfd inner buttocks as evident by erythema/hyperpigmentation and moisture. No open wounds. No suspected candidiasis.   serosanguinous drainage  No odor, erythema, increased warmth, tenderness, induration, fluctuance  FULL NOTE TO FOLLOW     LABS/ CULTURES/ RADIOLOGY:                        8.8    8.03  )-----------( 218      ( 02 Jan 2024 08:51 )             29.5       143  |  100  |  86  ----------------------------<  83      [01-02-24 @ 08:51]  4.3   |  32  |  1.56        Ca     8.8     [01-02-24 @ 08:51]      Mg     3.00     [01-02-24 @ 07:15]      Phos  4.5     [01-02-24 @ 07:15]    TPro  7.7  /  Alb  3.5  /  TBili  0.8  /  DBili  x   /  AST  197  /  ALT  165  /  AlkPhos  216  [01-02-24 @ 08:51]                               Wound SURGERY CONSULT NOTE    HPI:Mr. Antony is an 89 YO man with PMH of afib on Eliquis, b/l Le blisters, CHF (EF 45-50%, combined systolic and diastolic), CVA, HTN, COPD, CKD3, recent hospitalization for CHF exacerbation 11/22-11/27 where he left AMA, presenting with concern for LE blisters. He notes constipation and loss of appetite. In [place], patient was found to have CT with large left sided loculated effusion, cardiomegaly with BNP 2.8K. Echo showed LVEF 50-55%, moderate mitral stenosis. He was hypoxic on RA to 89%. Cardiology was consulted and recommended diuresis. Pulmonology was consulted for thoracentesis, which was done and 1L removed. His symptoms improved and pulmonology recommended transfer to [facility] for placement of pleurX with thoracic surgery. On the day of transfer was noted to have cholecystitis on RUQ ultrasound. Surgery was consulted and felt that patient is a poor surgical candidate, may benefit from a cholecystostomy drain. On arrival, patient still with shortness of breath, unable to lie flat on his back. He experiences chest pain if he moves when standing. He is constipated, last BM yesterday. He has had a "normal" amount of urine output.  (02 Jan 2024 02:31)    Wound consult requested to assist w/ management of  HF with edema, overlying blisters to bilateral lower legs. Patient endorses new wounds to bilateral legs over the past week during hospital stay at [place]. Endorses he first developed a " water blister:" to left medial lower leg and more recently to right medial lower leg. Patient endorses right medial knee " bulging" since 1880s. Denies smoking cigarettes reports he smokes cigars. Reports throbbing and itching to bilateral lower legs. Denies significant pain to legs. Patient walks with walker.      Current Diet: Diet, Regular:   1000mL Fluid Restriction ([id number])  Low Sodium  Piero(7 Gm Arginine/7 Gm Glut/1.2 Gm HMB     Qty per Day:  2  Supplement Feeding Modality:  Oral  Ensure Plus High Protein Cans or Servings Per Day:  1       Frequency:  Daily (01-02-24 @ 03:04)      PAST MEDICAL & SURGICAL HISTORY:  Afib      Congestive heart failure (CHF)      CVA (cerebral vascular accident)      Hypertension, unspecified type      Chronic obstructive pulmonary disease (COPD)      No significant past surgical history      REVIEW OF SYSTEMS:   General/ Skin/ MSK: see HPI  All other systems negative    MEDICATIONS  (STANDING):  allopurinol 100 milliGRAM(s) Oral daily  aspirin enteric coated 81 milliGRAM(s) Oral daily  atorvastatin 40 milliGRAM(s) Oral at bedtime  bacitracin   Ointment 1 Application(s) Topical daily  budesonide 160 MICROgram(s)/formoterol 4.5 MICROgram(s) Inhaler 2 Puff(s) Inhalation two times a day  cefTRIAXone   IVPB 1000 milliGRAM(s) IV Intermittent every 24 hours  dapagliflozin 10 milliGRAM(s) Oral daily  furosemide   Injectable 40 milliGRAM(s) IV Push daily  melatonin 3 milliGRAM(s) Oral at bedtime  metoprolol succinate ER 50 milliGRAM(s) Oral daily  metroNIDAZOLE    Tablet 500 milliGRAM(s) Oral every 8 hours  pantoprazole    Tablet 40 milliGRAM(s) Oral before breakfast  polyethylene glycol 3350 17 Gram(s) Oral two times a day  senna 2 Tablet(s) Oral at bedtime  tiotropium 2.5 MICROgram(s) Inhaler 2 Puff(s) Inhalation daily    MEDICATIONS  (PRN):  acetaminophen     Tablet .. 650 milliGRAM(s) Oral every 6 hours PRN Temp greater or equal to 38C (100.4F), Mild Pain (1 - 3)  albuterol    90 MICROgram(s) HFA Inhaler 2 Puff(s) Inhalation every 6 hours PRN Shortness of Breath and/or Wheezing  aluminum hydroxide/magnesium hydroxide/simethicone Suspension 30 milliLiter(s) Oral every 4 hours PRN Dyspepsia  ondansetron Injectable 4 milliGRAM(s) IV Push every 8 hours PRN Nausea and/or Vomiting      Allergies    No Known Allergies    Intolerances    SOCIAL HISTORY:     FAMILY HISTORY:      PHYSICAL EXAM:  Vital Signs Last 24 Hrs  T(C): 36.7 (02 Jan 2024 05:23), Max: 36.7 (02 Jan 2024 05:23)  T(F): 98 (02 Jan 2024 05:23), Max: 98 (02 Jan 2024 05:23)  HR: 113 (02 Jan 2024 05:23) (64 - 113)  BP: 121/77 (02 Jan 2024 05:23) (121/51 - 136/76)  BP(mean): --  RR: 18 (02 Jan 2024 05:23) (16 - 18)  SpO2: 100% (02 Jan 2024 05:23) (94% - 100%)    Parameters below as of 02 Jan 2024 05:23  Patient On (Oxygen Delivery Method): nasal cannula  O2 Flow (L/min): 2      Weight (kg): 70.3 (18 Dec 2023 09:52)  BMI (kg/m2): 25 (18 Dec 2023 09:52)      Constitutional: NAD / gaurded but stable,  A&Ox3/ Alert/ Confused  cachectic/ MO/ Obese/ frail  WD/ WN/ WG/ Disheveled  (+) low airloss support surface, (+) fluidized postioining devices, (+) complete cair boots     HEENT:  NC/AT, PERRL, EOMI, mucosa moist, throat clear, trachea midline, neck supple  Cardiovascular: RRR   Respiratory: CTA  Gastrointestinal soft NT/ND (+)BS  (+)PEG (+)ostomy  Neurology  weakened strength & sensation grossly intact/ paraesthesia  nonverbal, no follow commands/ paraplegic    Musculoskeletal:  limited/ FROM, no deformities/ contractures    Vascular: BLE equally warm/ cool,  no cyanosis, clubbing, edema  >LE //BLE edema equal  DP/PT pulses palpable  no acute ischemia noted  hemosiderin staining  Skin:  moist w/ good turgor  frail,  ecchymosis w/o hematoma  Moisture associated dermatitis in gluteal cleft anfd inner buttocks as evident by erythema/hyperpigmentation and moisture. No open wounds. No suspected candidiasis.   serosanguinous drainage  No odor, erythema, increased warmth, tenderness, induration, fluctuance  FULL NOTE TO FOLLOW     LABS/ CULTURES/ RADIOLOGY:                        8.8    8.03  )-----------( 218      ( 02 Jan 2024 08:51 )             29.5       143  |  100  |  86  ----------------------------<  83      [01-02-24 @ 08:51]  4.3   |  32  |  1.56        Ca     8.8     [01-02-24 @ 08:51]      Mg     3.00     [01-02-24 @ 07:15]      Phos  4.5     [01-02-24 @ 07:15]    TPro  7.7  /  Alb  3.5  /  TBili  0.8  /  DBili  x   /  AST  197  /  ALT  165  /  AlkPhos  216  [01-02-24 @ 08:51]                               Wound SURGERY CONSULT NOTE    HPI:Mr. Singleton is an 88 YO man with PMH of afib on Eliquis, b/l Le blisters, CHF (EF 45-50%, combined systolic and diastolic), CVA, HTN, COPD, CKD3, recent hospitalization for CHF exacerbation 11/22-11/27 where he left AMA, presenting with concern for LE blisters. He notes constipation and loss of appetite. In [facility] , patient was found to have CT with large left sided loculated effusion, cardiomegaly with BNP 2.8K. Echo showed LVEF 50-55%, moderate mitral stenosis. He was hypoxic on RA to 89%. Cardiology was consulted and recommended diuresis. Pulmonology was consulted for thoracentesis, which was done and 1L removed. His symptoms improved and pulmonology recommended transfer to [facility] for placement of pleurX with thoracic surgery. On the day of transfer was noted to have cholecystitis on RUQ ultrasound. Surgery was consulted and felt that patient is a poor surgical candidate, may benefit from a cholecystostomy drain. On arrival, patient still with shortness of breath, unable to lie flat on his back. He experiences chest pain if he moves when standing. He is constipated, last BM yesterday. He has had a "normal" amount of urine output.  (02 Jan 2024 02:31)    Wound consult requested to assist w/ management of  HF with edema, overlying blisters to bilateral lower legs. Patient endorses new wounds to bilateral legs over the past week during hospital stay at [place]. Endorses he first developed a " water blister:" to left medial lower leg and more recently to right medial lower leg. Patient endorses right medial knee " bulging" area since 1880s, this has being there for a long time' denies trauma to the area. Denies smoking cigarettes reports he smokes cigars. Reports throbbing and itching to bilateral lower legs equally. Endorses most of the times he cannot reach his legs to scratch but sometimes he scratches his legs. Denies significant pain to legs. Patient walks with walker. Patient denies CP or SOB at the time of assessment.     Current Diet: Diet, Regular:   1000mL Fluid Restriction ([id number])  Low Sodium  Piero(7 Gm Arginine/7 Gm Glut/1.2 Gm HMB     Qty per Day:  2  Supplement Feeding Modality:  Oral  Ensure Plus High Protein Cans or Servings Per Day:  1       Frequency:  Daily (01-02-24 @ 03:04)      PAST MEDICAL & SURGICAL HISTORY:  Afib      Congestive heart failure (CHF)      CVA (cerebral vascular accident)      Hypertension, unspecified type      Chronic obstructive pulmonary disease (COPD)      No significant past surgical history      REVIEW OF SYSTEMS:   General/ Skin/ MSK: see HPI  All other systems negative    MEDICATIONS  (STANDING):  allopurinol 100 milliGRAM(s) Oral daily  aspirin enteric coated 81 milliGRAM(s) Oral daily  atorvastatin 40 milliGRAM(s) Oral at bedtime  bacitracin   Ointment 1 Application(s) Topical daily  budesonide 160 MICROgram(s)/formoterol 4.5 MICROgram(s) Inhaler 2 Puff(s) Inhalation two times a day  cefTRIAXone   IVPB 1000 milliGRAM(s) IV Intermittent every 24 hours  dapagliflozin 10 milliGRAM(s) Oral daily  furosemide   Injectable 40 milliGRAM(s) IV Push daily  melatonin 3 milliGRAM(s) Oral at bedtime  metoprolol succinate ER 50 milliGRAM(s) Oral daily  metroNIDAZOLE    Tablet 500 milliGRAM(s) Oral every 8 hours  pantoprazole    Tablet 40 milliGRAM(s) Oral before breakfast  polyethylene glycol 3350 17 Gram(s) Oral two times a day  senna 2 Tablet(s) Oral at bedtime  tiotropium 2.5 MICROgram(s) Inhaler 2 Puff(s) Inhalation daily    MEDICATIONS  (PRN):  acetaminophen     Tablet .. 650 milliGRAM(s) Oral every 6 hours PRN Temp greater or equal to 38C (100.4F), Mild Pain (1 - 3)  albuterol    90 MICROgram(s) HFA Inhaler 2 Puff(s) Inhalation every 6 hours PRN Shortness of Breath and/or Wheezing  aluminum hydroxide/magnesium hydroxide/simethicone Suspension 30 milliLiter(s) Oral every 4 hours PRN Dyspepsia  ondansetron Injectable 4 milliGRAM(s) IV Push every 8 hours PRN Nausea and/or Vomiting      Allergies    No Known Allergies    Intolerances    SOCIAL HISTORY: AS per H&P No tobacco, alcohol or drug use. Lives with daughters. 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 Patient admits to smoking cigars. Endorses he is from [place] and speaks Sets[place] as his primary language but understands English.       FAMILY HISTORY:      PHYSICAL EXAM:  Vital Signs Last 24 Hrs  T(C): 36.7 (02 Jan 2024 05:23), Max: 36.7 (02 Jan 2024 05:23)  T(F): 98 (02 Jan 2024 05:23), Max: 98 (02 Jan 2024 05:23)  HR: 113 (02 Jan 2024 05:23) (64 - 113)  BP: 121/77 (02 Jan 2024 05:23) (121/51 - 136/76)  BP(mean): --  RR: 18 (02 Jan 2024 05:23) (16 - 18)  SpO2: 100% (02 Jan 2024 05:23) (94% - 100%)    Parameters below as of 02 Jan 2024 05:23  Patient On (Oxygen Delivery Method): nasal cannula  O2 Flow (L/min): 2      Weight (kg): 70.3 (18 Dec 2023 09:52)  BMI (kg/m2): 25 (18 Dec 2023 09:52)      Constitutional: Patient received sitting in the chair. Patient had finished breakfast. AOX4, pleasant. Personal walker at the bedside, worked with PT prior to skin assessment.   Frail   HEENT:  NC/AT, non icteric,  mucosa moist, throat clear, trachea midline, neck supple  Cardiovascular: Tachy   Respiratory: DOEM receiving supplemental  oxygen via nasal cannula, intact posterior ears.   Gastrointestinal: distended, non tender. Continence of stool  : Continence of urine, urinal at the bedside   Neurology: Able to follow commands   Musculoskeletal: aROM, no deformities/ contractures  Vascular: BLE equally cool to bilateral heels and distal foot/toes,  no cyanosis, clubbing, +3 edema equally. Capillary refill less than 3 seconds.   DP pulses non palpable  Femoral pulses palpable   no overt  ischemia noted  Right medial knee with area of bulging discoloration appears as a large bulging varicose vein?  -[id number]  -No tender on palpation   Multiple varicosities in lower extremities including above knees in thighs   Diffused Erythematous/hyperpigmented  papules in bilateral legs below knees, predominantly in anterior and medial lower legs and surrounding wounds   Multiple diffuse non fluctuant, non  draining scabs to bilateral legs below knee mostly to anterior lower legs   Right medial lower leg (proximal) intact fluid filled blister measuring [id number].   -No associated cellulitis   Left medial lower leg large unroof blister   -[id number]   -No associated cellulitis   -Tissue type exposing 100% pink moist dermis  -Moderate serous drainage   --Periwound skin with hyperpigmented/erythematous papules around the periphery, otherwise intact  -No crepitus, no fluctuance   Right medial lower leg with two ulcerations   -Measure in cluster given close proximity  by 1.2cm of intact epithelial bridge [id number]   -Mixed tissue type distally ulcer exposing 100% dermis and yellow fibrinous tissue (evidence of bioburden)  -Proximally mixed tissue type exposing approx 15% black moist firmly attach eschar and remaining yellow fibrinous tissue and pink dermis (evidence of bioburden)  -No associated cellulitis   -Periwound skin with hyperpigmented/erythematous papules around the periphery, otherwise intact  -No crepitus, no fluctuance   Skin:  moist w/ good turgor  see above   frail,  ecchymosis w/o hematoma   Back:  Patient stood up with minimal assistance for skin assessment.   Moisture associated dermatitis in gluteal cleft and inner buttocks as evident by blanching erythema/hyperpigmentation and moisture. No open wounds. No suspected candidiasis.     LABS/ CULTURES/ RADIOLOGY:                        8.8    8.03  )-----------( 218      ( 02 Jan 2024 08:51 )             29.5       143  |  100  |  86  ----------------------------<  83      [01-02-24 @ 08:51]  4.3   |  32  |  1.56        Ca     8.8     [01-02-24 @ 08:51]      Mg     3.00     [01-02-24 @ 07:15]      Phos  4.5     [01-02-24 @ 07:15]    TPro  7.7  /  Alb  3.5  /  TBili  0.8  /  DBili  x   /  AST  197  /  ALT  165  /  AlkPhos  216  [01-02-24 @ 08:51]        ACC: [id number] EXAM:  US DPLX LWR EXT VEINS COMPL BI   ORDERED BY: CHARLOTTE GAMEZ     PROCEDURE DATE:  11/25/2023          INTERPRETATION:  CLINICAL INFORMATION: Lower extremity swelling    COMPARISON: None available.    TECHNIQUE: Duplex sonography of the BILATERAL LOWER extremity veins with   color and spectral Doppler, with and without compression.    FINDINGS:    RIGHT:  Normal compressibility of the RIGHT common femoral, femoral and popliteal   veins.  Doppler examination shows normal spontaneous and phasic flow.  No RIGHT calf vein thrombosis is detected.    LEFT:  Normal compressibility of the LEFT common femoral, femoral and popliteal   veins.  Doppler examination shows normal spontaneous and phasic flow.  No LEFT calf vein thrombosis is detected.    IMPRESSION:  No evidence of deep venous thrombosis in either lower extremity.            --- End of Report ---            AJ PATHAK MD; Attending Radiologist  This document has been electronically signed. Nov 25 2023 10:59AM      BILATERAL ANKLE-BRACHIAL INDEX, SEGMENTAL LIMB PRESSURES, AND PULSE VOLUME                                  RECORDING REPORT       Name:        RAD SINGLETON Study Date:       1/2/2024  YOB: 1934          Patient MRN:      [id number]  Patient Age: 89 years           Accession Number: [id number]  Gender:      M                  Admission ID:     [id number]  Referring Physician:    Gabriela Guerrero MD  Interpreting Physician: Vince Hernández MD, PhD  Primary Sonographer:    Silvestre Lowery       --------------------------------------------------------------------------------  Procedure:     DELFINO study with segmental pressures and PVR.  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 Right: Right DELFINO is severely decreased (0.45). Right digit waveforms are flat. 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   2. Left: Left DELFINO is moderately decreased (0.45). Left digit waveforms are flat. Findings suggest the presence of multi-segment arterial disease in the left lower extremity, including significant small vessel arterial disease in the foot.    --------------------------------------------------------------------------------  MEASUREMENTS:    +---------+------------------+----+-------------------+---------------------+  |Right DELFINO|Rt Pressure (mmHg)|DELFINO |PVR Waveform       |Comment              |  +---------+------------------+----+-------------------+---------------------+  |Brachial |130               |    |                   |                     |  +---------+------------------+----+-------------------+---------------------+  |Thigh    |NC                |    |Normal  |Non-compressible     |  +---------+------------------+----+-------------------+---------------------+  |Calf     |NC                |    |Moderately Abnormal|Non-compressible     |  +---------+------------------+----+-------------------+---------------------+  |Ankle    |60                |0.46|Severely Abnormal  |                     |  +---------+------------------+----+-------------------+---------------------+  |PTA      |                  |    |                   |Open wounds/ dressing|  +---------+------------------+----+-------------------+---------------------+  |DPA      |60                |0.46|                   |                     |  +---------+------------------+----+-------------------+---------------------+    +--------+------------------+----+-------------------+---------------------+  |Left DELFINO|Lt Pressure (mmHg)|DELFINO |PVR Waveform       |Comment              |  +--------+------------------+----+-------------------+---------------------+  |Thigh   |NC                |   |Normal             |Non-compressible     |  +--------+------------------+----+-------------------+---------------------+  |Calf    |95                |0.73|Moderately Abnormal|                     |  +--------+------------------+----+-------------------+---------------------+  |Ankle   |93                |0.72|Severely Abnormal  |                     |  +--------+------------------+----+-------------------+---------------------+  |DPA     |93                |0.72|                   |Open wounds/ dressing|  +--------+------------------+----+-------------------+---------------------+    +---------+------------------+----+------------+-------+  |Right TBI|Rt Pressure (mmHg)|TBI |PPG Waveform|Comment|  +---------+------------------+----+------------+-------+  |Great Toe|0                 |0.00|Absent      |       |  +---------+------------------+----+------------+-------+    +---------+------------------+----+------------+-------+  |Left TBI |Lt Pressure (mmHg)|TBI |PPG Waveform|Comment|  +---------+------------------+----+------------+-------+  |Great Toe|0                 |0.00|Absent      |       |  +---------+------------------+----+------------+-------+    +-------+-----------+-----------+  |DELFINO/TBI|Today's DELFINO|Today's TBI|  +-------+-----------+-----------+  |Right  |0.46       |0.00       |  +-------+-----------+-----------+  |Left   |0.72       |0.00       |  +-------+-----------+-----------+       --------------------------------------------------------------------------------  FINDINGS:     Right Lower Extremity Arteries:  Right DELFINO is severely decreased (0.45). Right digit waveforms are flat. 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    Left Lower Extremity Arteries:  Left DELFINO is moderately decreased (0.45). Left digit waveforms are flat. Findings suggest the presence of multi-segment arterial disease in the left lower extremity, including significant small vessel arterial disease in the foot.         Electronically signed on 1/2/2024 at 6:22:11 PM by Vince Hernández MD, PhD, FACC, RPVI           *** Final ***                     Wound SURGERY CONSULT NOTE    HPI:Mr. Singleton is an 88 YO man with PMH of afib on Eliquis, b/l Le blisters, CHF (EF 45-50%, combined systolic and diastolic), CVA, HTN, COPD, CKD3, recent hospitalization for CHF exacerbation 11/22-11/27 where he left AMA, presenting with concern for LE blisters. He notes constipation and loss of appetite. In [facility] , patient was found to have CT with large left sided loculated effusion, cardiomegaly with BNP 2.8K. Echo showed LVEF 50-55%, moderate mitral stenosis. He was hypoxic on RA to 89%. Cardiology was consulted and recommended diuresis. Pulmonology was consulted for thoracentesis, which was done and 1L removed. His symptoms improved and pulmonology recommended transfer to [facility] for placement of pleurX with thoracic surgery. On the day of transfer was noted to have cholecystitis on RUQ ultrasound. Surgery was consulted and felt that patient is a poor surgical candidate, may benefit from a cholecystostomy drain. On arrival, patient still with shortness of breath, unable to lie flat on his back. He experiences chest pain if he moves when standing. He is constipated, last BM yesterday. He has had a "normal" amount of urine output.  (02 Jan 2024 02:31)    Wound consult requested to assist w/ management of  HF with edema, overlying blisters to bilateral lower legs. Patient endorses new wounds to bilateral legs over the past week during hospital stay at [facility]. Endorses he first developed a " water blister:" to left medial lower leg and more recently to right medial lower leg. Patient endorses right medial knee " bulging" area since 1880s, this has being there for a long time' denies trauma to the area. Denies smoking cigarettes reports he smokes cigars. Reports throbbing and itching to bilateral lower legs equally. Endorses most of the times he cannot reach his legs to scratch but sometimes he scratches his legs. Denies significant pain to legs. Patient walks with walker. Patient denies CP or SOB at the time of assessment.     Current Diet: Diet, Regular:   1000mL Fluid Restriction ([id number])  Low Sodium  Piero(7 Gm Arginine/7 Gm Glut/1.2 Gm HMB     Qty per Day:  2  Supplement Feeding Modality:  Oral  Ensure Plus High Protein Cans or Servings Per Day:  1       Frequency:  Daily (01-02-24 @ 03:04)      PAST MEDICAL & SURGICAL HISTORY:  Afib      Congestive heart failure (CHF)      CVA (cerebral vascular accident)      Hypertension, unspecified type      Chronic obstructive pulmonary disease (COPD)      No significant past surgical history      REVIEW OF SYSTEMS:   General/ Skin/ MSK: see HPI  All other systems negative    MEDICATIONS  (STANDING):  allopurinol 100 milliGRAM(s) Oral daily  aspirin enteric coated 81 milliGRAM(s) Oral daily  atorvastatin 40 milliGRAM(s) Oral at bedtime  bacitracin   Ointment 1 Application(s) Topical daily  budesonide 160 MICROgram(s)/formoterol 4.5 MICROgram(s) Inhaler 2 Puff(s) Inhalation two times a day  cefTRIAXone   IVPB 1000 milliGRAM(s) IV Intermittent every 24 hours  dapagliflozin 10 milliGRAM(s) Oral daily  furosemide   Injectable 40 milliGRAM(s) IV Push daily  melatonin 3 milliGRAM(s) Oral at bedtime  metoprolol succinate ER 50 milliGRAM(s) Oral daily  metroNIDAZOLE    Tablet 500 milliGRAM(s) Oral every 8 hours  pantoprazole    Tablet 40 milliGRAM(s) Oral before breakfast  polyethylene glycol 3350 17 Gram(s) Oral two times a day  senna 2 Tablet(s) Oral at bedtime  tiotropium 2.5 MICROgram(s) Inhaler 2 Puff(s) Inhalation daily    MEDICATIONS  (PRN):  acetaminophen     Tablet .. 650 milliGRAM(s) Oral every 6 hours PRN Temp greater or equal to 38C (100.4F), Mild Pain (1 - 3)  albuterol    90 MICROgram(s) HFA Inhaler 2 Puff(s) Inhalation every 6 hours PRN Shortness of Breath and/or Wheezing  aluminum hydroxide/magnesium hydroxide/simethicone Suspension 30 milliLiter(s) Oral every 4 hours PRN Dyspepsia  ondansetron Injectable 4 milliGRAM(s) IV Push every 8 hours PRN Nausea and/or Vomiting      Allergies    No Known Allergies    Intolerances    SOCIAL HISTORY: AS per H&P No tobacco, alcohol or drug use. Lives with daughters. 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 Patient admits to smoking cigars. Endorses he is from [place] and speaks Lithuanian as his primary language but understands English.       FAMILY HISTORY:      PHYSICAL EXAM:  Vital Signs Last 24 Hrs  T(C): 36.7 (02 Jan 2024 05:23), Max: 36.7 (02 Jan 2024 05:23)  T(F): 98 (02 Jan 2024 05:23), Max: 98 (02 Jan 2024 05:23)  HR: 113 (02 Jan 2024 05:23) (64 - 113)  BP: 121/77 (02 Jan 2024 05:23) (121/51 - 136/76)  BP(mean): --  RR: 18 (02 Jan 2024 05:23) (16 - 18)  SpO2: 100% (02 Jan 2024 05:23) (94% - 100%)    Parameters below as of 02 Jan 2024 05:23  Patient On (Oxygen Delivery Method): nasal cannula  O2 Flow (L/min): 2      Weight (kg): 70.3 (18 Dec 2023 09:52)  BMI (kg/m2): 25 (18 Dec 2023 09:52)      Constitutional: Patient received sitting in the chair. Patient had finished breakfast. AOX4, pleasant. Personal walker at the bedside, worked with PT prior to skin assessment.   Frail   HEENT:  NC/AT, non icteric,  mucosa moist, throat clear, trachea midline, neck supple  Cardiovascular: Tachy   Respiratory: DOEM receiving supplemental  oxygen via nasal cannula, intact posterior ears.   Gastrointestinal: distended, non tender. Continence of stool  : Continence of urine, urinal at the bedside   Neurology: Able to follow commands   Musculoskeletal: aROM, no deformities/ contractures  Vascular: BLE equally cool to bilateral heels and distal foot/toes,  no cyanosis, clubbing, +3 edema equally. Capillary refill less than 3 seconds.   DP pulses non palpable  Femoral pulses palpable   no overt  ischemia noted  Right medial knee with area of bulging discoloration appears as a large bulging varicose vein?  -[id number]  -No tender on palpation   Multiple varicosities in lower extremities including above knees in thighs   Diffused Erythematous/hyperpigmented  papules in bilateral legs below knees, predominantly in anterior and medial lower legs and surrounding wounds   Multiple diffuse non fluctuant, non  draining scabs to bilateral legs below knee mostly to anterior lower legs   Right medial lower leg (proximal) intact fluid filled blister measuring [id number].   -No associated cellulitis   Left medial lower leg large unroof blister   -[id number]   -No associated cellulitis   -Tissue type exposing 100% pink moist dermis  -Moderate serous drainage   --Periwound skin with hyperpigmented/erythematous papules around the periphery, otherwise intact  -No crepitus, no fluctuance   Right medial lower leg with two ulcerations   -Measure in cluster given close proximity  by 1.2cm of intact epithelial bridge [id number]   -Mixed tissue type distally ulcer exposing 100% dermis and yellow fibrinous tissue (evidence of bioburden)  -Proximally mixed tissue type exposing approx 15% black moist firmly attach eschar and remaining yellow fibrinous tissue and pink dermis (evidence of bioburden)  -No associated cellulitis   -Periwound skin with hyperpigmented/erythematous papules around the periphery, otherwise intact  -No crepitus, no fluctuance   Skin:  moist w/ good turgor  see above   frail,  ecchymosis w/o hematoma   Back:  Patient stood up with minimal assistance for skin assessment.   Moisture associated dermatitis in gluteal cleft and inner buttocks as evident by blanching erythema/hyperpigmentation and moisture. No open wounds. No suspected candidiasis.     LABS/ CULTURES/ RADIOLOGY:                        8.8    8.03  )-----------( 218      ( 02 Jan 2024 08:51 )             29.5       143  |  100  |  86  ----------------------------<  83      [01-02-24 @ 08:51]  4.3   |  32  |  1.56        Ca     8.8     [01-02-24 @ 08:51]      Mg     3.00     [01-02-24 @ 07:15]      Phos  4.5     [01-02-24 @ 07:15]    TPro  7.7  /  Alb  3.5  /  TBili  0.8  /  DBili  x   /  AST  197  /  ALT  165  /  AlkPhos  216  [01-02-24 @ 08:51]        ACC: [id number] EXAM:  US DPLX LWR EXT VEINS COMPL BI   ORDERED BY: CHARLOTTE GAMEZ     PROCEDURE DATE:  11/25/2023          INTERPRETATION:  CLINICAL INFORMATION: Lower extremity swelling    COMPARISON: None available.    TECHNIQUE: Duplex sonography of the BILATERAL LOWER extremity veins with   color and spectral Doppler, with and without compression.    FINDINGS:    RIGHT:  Normal compressibility of the RIGHT common femoral, femoral and popliteal   veins.  Doppler examination shows normal spontaneous and phasic flow.  No RIGHT calf vein thrombosis is detected.    LEFT:  Normal compressibility of the LEFT common femoral, femoral and popliteal   veins.  Doppler examination shows normal spontaneous and phasic flow.  No LEFT calf vein thrombosis is detected.    IMPRESSION:  No evidence of deep venous thrombosis in either lower extremity.            --- End of Report ---            AJ PATHAK MD; Attending Radiologist  This document has been electronically signed. Nov 25 2023 10:59AM      BILATERAL ANKLE-BRACHIAL INDEX, SEGMENTAL LIMB PRESSURES, AND PULSE VOLUME                                  RECORDING REPORT       Name:        RAD SINGLETON Study Date:       1/2/2024  YOB: 1934          Patient MRN:      [id number]  Patient Age: 89 years           Accession Number: [id number]  Gender:      M                  Admission ID:     [id number]  Referring Physician:    Gabriela Guerrero MD  Interpreting Physician: Vince Hernández MD, PhD  Primary Sonographer:    Silvestre Lowery       --------------------------------------------------------------------------------  Procedure:     DELFINO study with segmental pressures and PVR.  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 Right: Right DELFINO is severely decreased (0.45). Right digit waveforms are flat. 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   2. Left: Left DELFINO is moderately decreased (0.45). Left digit waveforms are flat. Findings suggest the presence of multi-segment arterial disease in the left lower extremity, including significant small vessel arterial disease in the foot.    --------------------------------------------------------------------------------  MEASUREMENTS:    +---------+------------------+----+-------------------+---------------------+  |Right DELFINO|Rt Pressure (mmHg)|DELFINO |PVR Waveform       |Comment              |  +---------+------------------+----+-------------------+---------------------+  |Brachial |130               |    |                   |                     |  +---------+------------------+----+-------------------+---------------------+  |Thigh    |NC                |    |Normal  |Non-compressible     |  +---------+------------------+----+-------------------+---------------------+  |Calf     |NC                |    |Moderately Abnormal|Non-compressible     |  +---------+------------------+----+-------------------+---------------------+  |Ankle    |60                |0.46|Severely Abnormal  |                     |  +---------+------------------+----+-------------------+---------------------+  |PTA      |                  |    |                   |Open wounds/ dressing|  +---------+------------------+----+-------------------+---------------------+  |DPA      |60                |0.46|                   |                     |  +---------+------------------+----+-------------------+---------------------+    +--------+------------------+----+-------------------+---------------------+  |Left DELFINO|Lt Pressure (mmHg)|DELFINO |PVR Waveform       |Comment              |  +--------+------------------+----+-------------------+---------------------+  |Thigh   |NC                |   |Normal             |Non-compressible     |  +--------+------------------+----+-------------------+---------------------+  |Calf    |95                |0.73|Moderately Abnormal|                     |  +--------+------------------+----+-------------------+---------------------+  |Ankle   |93                |0.72|Severely Abnormal  |                     |  +--------+------------------+----+-------------------+---------------------+  |DPA     |93                |0.72|                   |Open wounds/ dressing|  +--------+------------------+----+-------------------+---------------------+    +---------+------------------+----+------------+-------+  |Right TBI|Rt Pressure (mmHg)|TBI |PPG Waveform|Comment|  +---------+------------------+----+------------+-------+  |Great Toe|0                 |0.00|Absent      |       |  +---------+------------------+----+------------+-------+    +---------+------------------+----+------------+-------+  |Left TBI |Lt Pressure (mmHg)|TBI |PPG Waveform|Comment|  +---------+------------------+----+------------+-------+  |Great Toe|0                 |0.00|Absent      |       |  +---------+------------------+----+------------+-------+    +-------+-----------+-----------+  |DELFINO/TBI|Today's DELFINO|Today's TBI|  +-------+-----------+-----------+  |Right  |0.46       |0.00       |  +-------+-----------+-----------+  |Left   |0.72       |0.00       |  +-------+-----------+-----------+       --------------------------------------------------------------------------------  FINDINGS:     Right Lower Extremity Arteries:  Right DELFINO is severely decreased (0.45). Right digit waveforms are flat. 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    Left Lower Extremity Arteries:  Left DELFINO is moderately decreased (0.45). Left digit waveforms are flat. Findings suggest the presence of multi-segment arterial disease in the left lower extremity, including significant small vessel arterial disease in the foot.         Electronically signed on 1/2/2024 at 6:22:11 PM by Vince Hernández MD, PhD, FACC, RPVI           *** Final ***

## 2024-01-02 NOTE — PROGRESS NOTE ADULT - SUBJECTIVE AND OBJECTIVE BOX
89y Male seen at bedside this morning, no acute issues since transfer from [facility]. Seen sitting up in chair, LE edema with wounds. On 3L NC with HER and with speaking. States "I feel better sitting up".     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  He had recently been hospitalized for a CHF exacerbation from 11/22 to 11/27/23 when he left AMA.  Patient is known to be a poor historian and is unable to give a reliable history. He denies chest pain, palpitations, N/V, HA, or dizziness. He admits to occasional anxiety, SOB, constipation, and RUQ tenderness.  He was found to have a recurrent  large L pleural effusion on CXR 12/27.  He had had a thoracentesis (1 L drained 12/19/23) and now the effusion recurred.  Path from 12/19 was neg for malignant cells and cultures showed no growth.  Pulmonology recommended the PleurX while cardiology recommended diuresis.  He was transferred to [facility] for further medical management and to have a thoracic surgery evaluation for a possible PleurX catheter.  On the day of transfer, an abd US showed acute cholecystitis but the pt is a poor surgical candidate and a cholecystostomy is being considered.          Vital Signs:  Vital Signs Last 24 Hrs  T(C): 36.7 (01-02-24 @ 05:23), Max: 36.7 (01-02-24 @ 05:23)  T(F): 98 (01-02-24 @ 05:23), Max: 98 (01-02-24 @ 05:23)  HR: 113 (01-02-24 @ 05:23) (64 - 113)  BP: 121/77 (01-02-24 @ 05:23) (121/51 - 136/76)  RR: 18 (01-02-24 @ 05:23) (16 - 18)  SpO2: 100% (01-02-24 @ 05:23) (92% - 100%) on (O2)    Telemetry/Alarms:  Relevant labs, Radiology and Medications reviewed    CT Chest:   < from: CT Chest No Cont (12.20.23 @ 11:11) >  IMPRESSION: Moderate left pleural effusion. Band type opacity is   identified within the right lower lobe, likely related to atelectasis.   Opacity within the lingular segment of the left upper lobe and left lower   lobe is consistent with atelectasis; however, underlying parenchymal   infiltrate/consolidation cannot be excluded. Mediastinal lymph nodes   identified measuring up to 2.6 x 2.0 cm.  --- End of Report ---  < end of copied text >        Pertinent Physical Exam  Gen: WD, Pt on O2  Pulm: CTA B/L  CV: RRR  Ext: B/L pedal edema with open sores    Assessment  Recurrent Left pleural effusion in setting of CHF exacerbation      Plan  Continue care per primary team  ......  Discussed at AM rounds      89y Male seen at bedside this morning, no acute issues since transfer from [facility]. Seen sitting up in chair, LE edema with wounds. On 3L NC with HER and with speaking. States "I feel better sitting up".     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 ER on 12/18/23 due to LE edema and shortness of breath.  He had recently been hospitalized for a CHF exacerbation from 11/22 to 11/27/23 when he left AMA.  Patient is known to be a poor historian and is unable to give a reliable history. He denies chest pain, palpitations, N/V, HA, or dizziness. He admits to occasional anxiety, SOB, constipation, and RUQ tenderness.  He was found to have a recurrent  large L pleural effusion on CXR 12/27.  He had had a thoracentesis (1 L drained 12/19/23) and now the effusion recurred.  Path from 12/19 was neg for malignant cells and cultures showed no growth.  Pulmonology recommended the PleurX while cardiology recommended diuresis.  He was transferred to [facility] for further medical management and to have a thoracic surgery evaluation for a possible PleurX catheter.  On the day of transfer, an abd US showed acute cholecystitis but the pt is a poor surgical candidate and a cholecystostomy is being considered.          Vital Signs:  Vital Signs Last 24 Hrs  T(C): 36.7 (01-02-24 @ 05:23), Max: 36.7 (01-02-24 @ 05:23)  T(F): 98 (01-02-24 @ 05:23), Max: 98 (01-02-24 @ 05:23)  HR: 113 (01-02-24 @ 05:23) (64 - 113)  BP: 121/77 (01-02-24 @ 05:23) (121/51 - 136/76)  RR: 18 (01-02-24 @ 05:23) (16 - 18)  SpO2: 100% (01-02-24 @ 05:23) (92% - 100%) on (O2)    Telemetry/Alarms:  Relevant labs, Radiology and Medications reviewed    CT Chest:   < from: CT Chest No Cont (12.20.23 @ 11:11) >  IMPRESSION: Moderate left pleural effusion. Band type opacity is   identified within the right lower lobe, likely related to atelectasis.   Opacity within the lingular segment of the left upper lobe and left lower   lobe is consistent with atelectasis; however, underlying parenchymal   infiltrate/consolidation cannot be excluded. Mediastinal lymph nodes   identified measuring up to 2.6 x 2.0 cm.  --- End of Report ---  < end of copied text >        Pertinent Physical Exam  Gen: WD, Pt on O2  Pulm: CTA B/L  CV: RRR  Ext: B/L pedal edema with open sores    Assessment  Recurrent Left pleural effusion in setting of CHF exacerbation      Plan  Continue care per primary team  ......  Discussed at AM rounds      89y Male seen at bedside this morning, no acute issues since transfer from [facility]. Seen sitting up in chair, LE edema with wounds. On 3L NC with HER and with speaking. States "I feel better sitting up".     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 ER on 12/18/23 due to LE edema and shortness of breath.  He had recently been hospitalized for a CHF exacerbation from 11/22 to 11/27/23 when he left AMA.  Patient is known to be a poor historian and is unable to give a reliable history. He denies chest pain, palpitations, N/V, HA, or dizziness. He admits to occasional anxiety, SOB, constipation, and RUQ tenderness.  He was found to have a recurrent  large L pleural effusion on CXR 12/27.  He had had a thoracentesis (1 L drained 12/19/23) and now the effusion recurred.  Path from 12/19 was neg for malignant cells and cultures showed no growth.  Pulmonology recommended the PleurX while cardiology recommended diuresis.  He was transferred to [facility] for further medical management and to have a thoracic surgery evaluation for a possible PleurX catheter.  On the day of transfer, an abd US showed acute cholecystitis but the pt is a poor surgical candidate and a cholecystostomy is being considered.          Vital Signs:  Vital Signs Last 24 Hrs  T(C): 36.7 (01-02-24 @ 05:23), Max: 36.7 (01-02-24 @ 05:23)  T(F): 98 (01-02-24 @ 05:23), Max: 98 (01-02-24 @ 05:23)  HR: 113 (01-02-24 @ 05:23) (64 - 113)  BP: 121/77 (01-02-24 @ 05:23) (121/51 - 136/76)  RR: 18 (01-02-24 @ 05:23) (16 - 18)  SpO2: 100% (01-02-24 @ 05:23) (92% - 100%) on (O2)    Telemetry/Alarms:  Relevant labs, Radiology and Medications reviewed    CT Chest:   < from: CT Chest No Cont (12.20.23 @ 11:11) >  IMPRESSION: Moderate left pleural effusion. Band type opacity is   identified within the right lower lobe, likely related to atelectasis.   Opacity within the lingular segment of the left upper lobe and left lower   lobe is consistent with atelectasis; however, underlying parenchymal   infiltrate/consolidation cannot be excluded. Mediastinal lymph nodes   identified measuring up to 2.6 x 2.0 cm.  --- End of Report ---  < end of copied text >        Pertinent Physical Exam  Gen: WD, Pt on O2  Pulm: CTA B/L  CV: RRR  Ext: B/L pedal edema with open sores    Assessment  Recurrent Left pleural effusion in setting of CHF exacerbation      Plan  Continue care per primary team  Hx of CHF with SOB and pedal edema. Although could benefit from diuresis, BNP elevated  In setting of possible acute cholecystitis pt is scheduled for HIDA scan in AM, not a surgical candidate   AC for afib, with last documented dose of eliquis at [facility] on 1/1/24. Recommend INR in AM   Recommend IR pigtail catheter placement   CXR  Supplemental O2 and monitoring   Pt images and chart reviewed by Dr Bhat   Discussed with primary team MD    Thoracic [id number]     89y Male seen at bedside this morning, no acute issues since transfer from [facility]. Seen sitting up in chair, LE edema with wounds. On 3L NC with HER and with speaking. States "I feel better sitting up".     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  He had recently been hospitalized for a CHF exacerbation from 11/22 to 11/27/23 when he left AMA.  Patient is known to be a poor historian and is unable to give a reliable history. He denies chest pain, palpitations, N/V, HA, or dizziness. He admits to occasional anxiety, SOB, constipation, and RUQ tenderness.  He was found to have a recurrent  large L pleural effusion on CXR 12/27.  He had had a thoracentesis (1 L drained 12/19/23) and now the effusion recurred.  Path from 12/19 was neg for malignant cells and cultures showed no growth.  Pulmonology recommended the PleurX while cardiology recommended diuresis.  He was transferred to [facility] for further medical management and to have a thoracic surgery evaluation for a possible PleurX catheter.  On the day of transfer, an abd US showed acute cholecystitis but the pt is a poor surgical candidate and a cholecystostomy is being considered.          Vital Signs:  Vital Signs Last 24 Hrs  T(C): 36.7 (01-02-24 @ 05:23), Max: 36.7 (01-02-24 @ 05:23)  T(F): 98 (01-02-24 @ 05:23), Max: 98 (01-02-24 @ 05:23)  HR: 113 (01-02-24 @ 05:23) (64 - 113)  BP: 121/77 (01-02-24 @ 05:23) (121/51 - 136/76)  RR: 18 (01-02-24 @ 05:23) (16 - 18)  SpO2: 100% (01-02-24 @ 05:23) (92% - 100%) on (O2)    Telemetry/Alarms:  Relevant labs, Radiology and Medications reviewed    CT Chest:   < from: CT Chest No Cont (12.20.23 @ 11:11) >  IMPRESSION: Moderate left pleural effusion. Band type opacity is   identified within the right lower lobe, likely related to atelectasis.   Opacity within the lingular segment of the left upper lobe and left lower   lobe is consistent with atelectasis; however, underlying parenchymal   infiltrate/consolidation cannot be excluded. Mediastinal lymph nodes   identified measuring up to 2.6 x 2.0 cm.  --- End of Report ---  < end of copied text >        Pertinent Physical Exam  Gen: WD, Pt on O2  Pulm: CTA B/L  CV: RRR  Ext: B/L pedal edema with open sores    Assessment  Recurrent Left pleural effusion in setting of CHF exacerbation      Plan  Continue care per primary team  Hx of CHF with SOB and pedal edema. Although could benefit from diuresis, BNP elevated  In setting of possible acute cholecystitis pt is scheduled for HIDA scan in AM, not a surgical candidate   AC for afib, with last documented dose of eliquis at [facility] on 1/1/24. Recommend INR in AM   Recommend IR pigtail catheter placement   CXR  Supplemental O2 and monitoring   Pt images and chart reviewed by Dr Bhat   Discussed with primary team MD    Thoracic [id number]

## 2024-01-02 NOTE — H&P ADULT - PROBLEM SELECTOR PLAN 2
- S/P thoracentesis with 1 L removal (described as "blood") 12/19  - Cytopath negative for malignancy. Suspect 2/2 HF exacerbation  - Transferred to [facility] for thoracic evaluation for pleurX catheter; seen and will re-evaluate in AM - S/P thoracentesis with 1 L removal (described as "blood") 12/19  - Cytopath negative for malignancy. Suspect 2/2 HF exacerbation  - Transferred to [facility] for thoracic evaluation for pleurX catheter; seen and will re-evaluate in AM - S/P thoracentesis with 1 L removal (described as "blood") 12/19 - exudative, large amount of RBCs noted  - Cytopath negative for malignancy  - Transferred to [facility] for thoracic evaluation for pleurX catheter; seen by CT surgery and will re-evaluate in AM  - Hold AC iso ?hemothorax - S/P thoracentesis with 1 L removal (described as "blood") 12/19 - exudative, large amount of RBCs noted  - Cytopath negative for malignancy  - Transferred to [facility] for thoracic evaluation for pleurX catheter; seen by CT surgery and will re-evaluate in AM  - Hold AC iso ?hemothorax - S/P thoracentesis with 1 L removal (described as "blood") 12/19 - exudative, large amount of RBCs noted  - Cytopath negative for malignancy  - Transferred to [facility] for thoracic evaluation for pleurX catheter and pleuroscopy; seen by CT surgery and will re-evaluate in AM  - Hold AC iso ?hemothorax - S/P thoracentesis with 1 L removal (described as "blood") 12/19 - exudative, large amount of RBCs noted  - Cytopath negative for malignancy  - Transferred to [facility] for thoracic evaluation for pleurX catheter and pleuroscopy; seen by CT surgery and will re-evaluate in AM  - Hold AC iso ?hemothorax

## 2024-01-02 NOTE — PROGRESS NOTE ADULT - PROBLEM SELECTOR PLAN 7
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

## 2024-01-02 NOTE — H&P ADULT - ATTENDING COMMENTS
89M with PMH of AFib, combined systolic and diastolic CHF (E 45-50%), CVA, HTN, COPD, CKD3, and recent hospitalization for CHF exacerbation, presented initially to  for worsening LE edema, found to have acute on chronic CHF as well as large pleural effusion now s/p thoracocentesis with 1L removal, transferred from  to [facility] for CTS evaluation for possible PleurX placement.    #L-sided Pleural Effusion    #Acute on Chronic CHF    #Cholecystitis    #AFib 89M with PMH of AFib, combined systolic and diastolic CHF (E 45-50%), CVA, HTN, COPD, CKD3, and recent hospitalization for CHF exacerbation, presented initially to  for worsening LE edema, found to have acute on chronic CHF as well as large pleural effusion now s/p thoracocentesis with 1L removal, transferred from  to [facility] for CTS evaluation for possible PleurX placement.    #L-sided Pleural Effusion    #Acute on Chronic CHF    #Cholecystitis    #AFib 89M with PMH of AFib, combined systolic and diastolic CHF (E 45-50%), CVA, HTN, COPD, CKD3, and recent hospitalization for CHF exacerbation, presented initially to  for worsening LE edema, found to have acute on chronic CHF as well as large pleural effusion now s/p thoracocentesis with 1L removal, transferred from  to [facility] for CTS evaluation for possible PleurX placement.    #L-sided Pleural Effusion  L-sided pleural effusion found during  admission, now s/p thoracocentesis and 1L fluid removal  - appears to be exudative in nature, unilateral, and bloody effusion  - unclear etiology, prior CT chests unable to r/o consolidations/infectious findings  - cytopathology has been negative for malignant cells, showing reactive mesothelial cells in Dec and in Nov  - transferred here for CTS eval for pleuroscopy/?pleural biopsy and possible PleurX  - f/u CTS recs    #Acute on Chronic CHF  - proBNP elevated with lower extremity edema. TTE with EF 50-55%. Was on Torsemide 100mg daily regimen outpatient and started on IV Lasix in   - strict I/Os, daily weights, fluid restriction  - diuretics initially held at  due to IRINEO (?CRS), recently restarted with resolving IRINEO  - improved LE edema per patient after lasix initiation  - cont IV Lasix 40 QD for now, re-assess daily    #Cholecystitis  - sudden acute RUQ pain while patient was at , with RUQ US showing distended gallbladder and gallstones and positive Engel's c/f acute cholecystitis  - tenderness with deep RUQ palpation, with elevated LFTs and alk phos  - start empiric antibiotics   - surgery recs appreciated, no acute intervention, recs HIDA scan and consider IR if positive for percutaneous approach given poor surgical candidacy    #AFib  History of chronic Afib  - hold Eliquis with recent bloody tap as well as possible pending procedures  - monitor CBC, consider restarting if stable and with discussion with pt/family  - cont metoprolol    #GOC  - per chart review, patient's close friend Antonietta is his HCP with his daughter as alternate (see palliative GOC documentation)  - has been deferring conversations regarding code status likely due to suddenness of recurrent hospitlalizations and past intubation/family history, last noted to be full code 89M with PMH of AFib, combined systolic and diastolic CHF (E 45-50%), CVA, HTN, COPD, CKD3, and recent hospitalization for CHF exacerbation, presented initially to  for worsening LE edema, found to have acute on chronic CHF as well as large pleural effusion now s/p thoracocentesis with 1L removal, transferred from  to [facility] for CTS evaluation for possible PleurX placement.    #L-sided Pleural Effusion  L-sided pleural effusion found during  admission, now s/p thoracocentesis and 1L fluid removal  - appears to be exudative in nature, unilateral, and bloody effusion  - unclear etiology, prior CT chests unable to r/o consolidations/infectious findings  - cytopathology has been negative for malignant cells, showing reactive mesothelial cells in Dec and in Nov  - transferred here for CTS eval for pleuroscopy/?pleural biopsy and possible PleurX  - f/u CTS recs    #Acute on Chronic CHF  - proBNP elevated with lower extremity edema. TTE with EF 50-55%. Was on Torsemide 100mg daily regimen outpatient and started on IV Lasix in   - strict I/Os, daily weights, fluid restriction  - diuretics initially held at  due to IRINEO (?CRS), recently restarted with resolving IRINEO  - improved LE edema per patient after lasix initiation  - cont IV Lasix 40 QD for now, re-assess daily    #Cholecystitis  - sudden acute RUQ pain while patient was at , with RUQ US showing distended gallbladder and gallstones and positive Engel's c/f acute cholecystitis  - tenderness with deep RUQ palpation, with elevated LFTs and alk phos  - start empiric antibiotics   - surgery recs appreciated, no acute intervention, recs HIDA scan and consider IR if positive for percutaneous approach given poor surgical candidacy    #AFib  History of chronic Afib  - hold Eliquis with recent bloody tap as well as possible pending procedures  - monitor CBC, consider restarting if stable and with discussion with pt/family  - cont metoprolol    #GOC  - per chart review, patient's close friend Antonietta is his HCP with his daughter as alternate (see palliative GOC documentation)  - has been deferring conversations regarding code status likely due to suddenness of recurrent hospitlalizations and past intubation/family history, last noted to be full code

## 2024-01-02 NOTE — PHYSICAL THERAPY INITIAL EVALUATION ADULT - ACTIVE RANGE OF MOTION EXAMINATION, REHAB EVAL
carlos. upper extremity Active ROM was WNL (within normal limits)/bilateral lower extremity Active ROM was WNL (within normal limits)

## 2024-01-02 NOTE — PROGRESS NOTE ADULT - ATTENDING COMMENTS
89M w/ HTN, COPD, CKD (b/l Cr 1.6), AF (apixaban), h/o CVA, combined systolic and diastolic heart failure (EF 45-50%) with chronic LE blisters, recent hospitalization for acute decompensated heart failure (discharged before medically advised) re-admitted to [facility] for worsening LE blisters found to have acute decompensated heart failure with large left loculated pleural effusion s/p 1L thoracentesis with exudative effusion transferred to [facility] for thoracic surgery evaluation for possible pleurX catheter. Course c/b RUQUS w/ gallstones and GB wall thickening concerning for possible acute cholecystitis.    On evaluation, having leg pain, not having any abdominal pain. SOB worsened with laying flat.     - Reviewed pleural fluid studies from [place] - exudative effusion less c/w CHF exacerbation  - Appreciate thoracic surgery recommendations  - Appreciate surgery recommendations  - Plan for HIDA scan on 1/3, if positive IR consult for consideration of percutaneous cholecystostomy tube  - D/c abx  - Diuresis with lasix 40 IV BID and dapagliflozin  - Wound care consulted for leg wounds - recommending DELFINO/PVR and dermatology consult  - Holding apixaban in case of procedures, start DVT ppx in the meantime      Discussed plan as documented above with patient's HCP Antonietta over the phone 89M w/ HTN, COPD, CKD (b/l Cr 1.6), AF (apixaban), h/o CVA, combined systolic and diastolic heart failure (EF 45-50%) with chronic LE blisters, recent hospitalization for acute decompensated heart failure (discharged before medically advised) re-admitted to [facility] for worsening LE blisters found to have acute decompensated heart failure with large left loculated pleural effusion s/p 1L thoracentesis with exudative effusion transferred to [facility] for thoracic surgery evaluation for possible pleurX catheter. Course c/b RUQUS w/ gallstones and GB wall thickening concerning for possible acute cholecystitis.    On evaluation, having leg pain, not having any abdominal pain. SOB worsened with laying flat.     - Reviewed pleural fluid studies from [place] - exudative effusion less c/w CHF exacerbation  - Appreciate thoracic surgery recommendations  - Appreciate surgery recommendations  - Plan for HIDA scan on 1/3, if positive IR consult for consideration of percutaneous cholecystostomy tube  - D/c abx  - Diuresis with lasix 40 IV BID and dapagliflozin  - Wound care consulted for leg wounds - recommending DELFINO/PVR and dermatology consult  - Holding apixaban in case of procedures, start DVT ppx in the meantime      Discussed plan as documented above with patient's HCP Antonietta over the phone

## 2024-01-02 NOTE — DIETITIAN INITIAL EVALUATION ADULT - OTHER INFO
Patient is A&O x3 at baseline.  Seen for nutrition consult of MST scores 2 or greater and decompensated heart failure on fluid restriction.  Patient is tolerating a PO diet of Low sodium with Ensure Plus HP 1x/day (350kcal, 20gm protein) to assist oral intake.  Patient reported very poor appetite and PO intake due to feeling unwell.   Estimated PO intake ~ 50% for a couple months, only able to consume Ensure supplement as primary source of nutrition at this time.  No specific food and fluid preferences discussed.  No c/o chewing/swallowing difficulty.  No reported N/V/D, constipation on bowel regimen (Miralax and senna), last BM reported ( 1/1/24 ) per RN flowsheets.  Skin noted 3+ edema on R and L foot with LLE and RLE blister per RN flowsheets, on Piero 2 packs/daily per MD and team.    No current weight to assess as patient has difficulty lying flat to use bed scale and unable to stand on standing scale.  CBW via HIE record 70.3 kg (12-19-23), height 170.2cm, BMI 24.2, indicative of normal weight status.  Per [facility] weight history: 73.9kg (11-22-23), 74.8kg (10-3-23), 73.5kg (2-15-23).  Patient appeared to be </=70 kg from appearance with moderate temple muscle wasting and buccal fat wasting.  Patient is likely having more weight loss in 1 month.  Patient was educated for thirst management s/p fluid restriction, heart failure nutrition therapy handout provided to Patient at bedside as references.  Patient is fully aware of low sodium dietary restriction.  Labs ( 1/2 ) reviewed BUN 86H, Crea 1.56H- consistent with IRINEO, likely related to IV lasix 40mg daily for diuresis 2/2 CHF.  Per chart review, Patient noted large pleural effusion s/p 1L removal.  Pt is meeting criteria for malnutrition at this time.  Patient is A&O x3 at baseline.  Seen for nutrition consult of MST scores 2 or greater and decompensated heart failure on fluid restriction.  Patient is tolerating a PO diet of Low sodium with Ensure Plus HP 1x/day (350kcal, 20gm protein) to assist oral intake.  Patient reported very poor appetite and PO intake due to feeling unwell.   Estimated PO intake ~ 50% for a couple months, only able to consume Ensure supplement as primary source of nutrition at this time.  No specific food and fluid preferences discussed.  No c/o chewing/swallowing difficulty.  No reported N/V/D, constipation on bowel regimen (Miralax and senna), last BM reported ( 1/1/24 ) per RN flowsheets.  Skin noted 3+ edema on R and L foot with LLE and RLE blister per RN flowsheets, on Piero 2 packs/daily per MD and team.    No current weight to assess as patient has difficulty lying flat to use bed scale and unable to stand on standing scale.  CBW via HIE record 70.3 kg (12-19-23), height 170.2cm, BMI 24.2, indicative of normal weight status.  Per [facility] weight history: 73.9kg (11-22-23), 74.8kg (10-3-23), 73.5kg (2-15-23).  Patient appeared to be </=70 kg from appearance with moderate temple muscle wasting and buccal fat wasting.  Patient is likely having more weight loss in 1 month.  Patient was educated for thirst management s/p fluid restriction, heart failure nutrition therapy handout provided to Patient at bedside as references.  Patient is fully aware of low sodium dietary restriction.  Labs ( 1/2 ) reviewed BUN 86H, Crea 1.56H- consistent with IRINEO, likely related to IV lasix 40mg daily for diuresis 2/2 CHF.  Per chart review, Patient noted large pleural effusion s/p 1L removal.  Pt is meeting criteria for malnutrition at this time.

## 2024-01-02 NOTE — PHYSICAL THERAPY INITIAL EVALUATION ADULT - PERTINENT HX OF CURRENT PROBLEM, REHAB EVAL
Patient is a 89 year old male, PMH stated below, presents with LE blisters; found to have acute on chronic heart failure, large pleural effusions; transfer from  for pleurx.

## 2024-01-02 NOTE — PHYSICAL THERAPY INITIAL EVALUATION ADULT - GAIT DISTANCE, PT EVAL
15 feet; +NC at 2L; desaturated to 77%; increased O2 to 3L; SPO2 increased to 88-95% with standing rest and verbal cues for deep breathing. TAYO Cohen made aware.

## 2024-01-02 NOTE — DIETITIAN INITIAL EVALUATION ADULT - PERSON TAUGHT/METHOD
Heart failure therapy, heart failure nutrition therapy/verbal instruction/written material/patient instructed

## 2024-01-02 NOTE — H&P ADULT - PROBLEM SELECTOR PLAN 1
- pro-BNP stable, still 2.8K  - TTE with EF 50-55%  - Home regimen is torsemide 100 daily per PCP Dr. Rubio - started on lasix 40 IV in GC, with Farxiga - will continue   - Strict I/os, daily weights, fluid restriction and low salt diet - additional diuresis as needed  - Monitor on tele, monitor K on BMP  - Per patient LE edema appears improved

## 2024-01-02 NOTE — DIETITIAN INITIAL EVALUATION ADULT - ADD RECOMMEND
1. Please obtain current weight and continue to trend weights.  2. Continue present diet order as it remains appropriate at this time.   3. Encourage PO intake and honor food preferences as able.   4. Nursing to document PO in RN flow sheets and monitor weekly weights.   5. Continue to monitor skin integrity, bowel regimen, and nutrition pertinent labs.

## 2024-01-02 NOTE — PROGRESS NOTE ADULT - PROBLEM SELECTOR PLAN 3
- Surgery consult  - Ceftriaxone/flagyl   - Blood cultures x2, urine culture  - Lipase in AM  - Avoid opioids - Surgery- not a surgical candidate at this time  - s/p ceftriaxone, flagyl  - will monitor off abx  - Blood cultures x2, urine culture  - Lipase normal  - Avoid opioids  - HIDA in AM to r/o cholecystitis

## 2024-01-02 NOTE — PROGRESS NOTE ADULT - PROBLEM SELECTOR PLAN 4
-C/w rate control with metoprolol  -On eliquis, will hold ISO procedures and ?hemothorax - will need to discuss risks/benefits with patient and family of continuing AC -C/w rate control with metoprolol  -On eliquis at home, will hold ISO procedures and ?hemothorax - will need to discuss risks/benefits with patient and family of continuing AC -C/w rate control with metoprolol  -On eliquis at home, will hold ISO possible procedures

## 2024-01-02 NOTE — PROGRESS NOTE ADULT - PROBLEM SELECTOR PLAN 5
Slowly downtrending since October ~11  - B12, folate, iron panel, retic %  - CTM CBC Slowly downtrending since October ~11  - B12, folate, iron panel, retic % all WNL  - CTM CBC

## 2024-01-02 NOTE — H&P ADULT - HISTORY OF PRESENT ILLNESS
Mr. Antony is an 89 YO man with PMH of afib on Eliquis, b/l Le blisters, CHF (EF 45-50%, combined systolic and diastolic), CVA, HTN, COPD, CKD3, recent hospitalization for CHF exacerbation 11/22-11/27 where he left AMA, presenting with concern for LE blisters. He notes constipation and loss of appetite.    In [place], patient was found to have CT with large left sided loculated effusion, cardiomegaly with BNP 2.8K. Echo showed LVEF 50-55%, moderate mitral stenosis. He was hypoxic on RA to 89%. Cardiology was consulted and recommended diuresis. Pulmonology was consulted for thoracentesis, which was done and 1L removed. His symptoms improved and pulmonology recommended transfer to [facility] for placement of pleurX with thoracic surgery.     On the day of transfer was noted to have cholecystitis on RUQ ultrasound. Surgery was consulted and felt that patient is a poor surgical candidate, may benefit from a cholecystostomy drain.     On arrival, patient still with shortness of breath, unable to lie flat on his back. He experiences chest pain if he moves when standing. He is constipated, last BM yesterday. He has had a "normal" amount of urine output.  Mr. Antony is an 89 YO man with PMH of afib on Eliquis, b/l Le blisters, CHF (EF 45-50%, combined systolic and diastolic), CVA, HTN, COPD, CKD3, recent hospitalization for CHF exacerbation 11/22-11/27 where he left AMA, presenting with concern for LE blisters. He notes constipation and loss of appetite.    In [place], patient was found to have CT with large left sided loculated effusion, cardiomegaly with BNP 2.8K. Echo showed LVEF 50-55%, moderate mitral stenosis. He was hypoxic on RA to 89%. Cardiology was consulted and recommended diuresis. Pulmonology was consulted for thoracentesis, which was done and 1L removed. His symptoms improved and pulmonology recommended transfer to [facility] for placement of pleurX with thoracic surgery.     On the day of transfer was noted to have cholecystitis on RUQ ultrasound. Surgery was consulted and felt that patient is a poor surgical candidate, may benefit from a cholecystostomy drain.     On arrival, patient still with shortness of breath, unable to lie flat on his back. He experiences chest pain if he moves when standing. He is constipated, last BM yesterday. He has had a "normal" amount of urine output.

## 2024-01-02 NOTE — CONSULT NOTE ADULT - ASSESSMENT
ASSESSMENT:  Recurrent Left pleural effusion in setting of CHF exacerbation    PLAN:  The thoracic surgeon will further review the medical history and films   Thoracic surgery will follow up

## 2024-01-02 NOTE — CONSULT NOTE ADULT - ASSESSMENT
Assessment/Plan: Jaylon Antony is an 89 y.o. man with history of HTN, COPD, CKD, atrial fibrillation (Eliquis), CHF (EF 45-50%, combined systolic and diastolic),  and CVA who is currently admitted for CHF exacerbation. Surgery consultation prompted by RUQ US demonstrating a distended gallbladder and cholelithiasis. Patient seen by CT surgery.     Wound Consult requested to assist w/ management of bilateral leg wounds. Patient known to wound care team at another [facility] facility last seen on 12/29 for The left lower extremity below the knee has numerous (<1 cm) brown scabs, previously serous filled blisters. Left medial calf with large ruptured blister, draining scant serous fluid. The right lower extremity with same brown scabs, but also noted medially is a large area of deroofed blisters". Bacitracin ointment was recommended it and light compression.     Bilateral medial legs with unroof blisters  -No associated cellulitis   -+3 edema to bilateral legs equally   -Anterior lower leg and medial lower leg bilaterally with diffuse erythematous papules and non draining scabs secondary to? Patient endorses pruritis and admits to scratching at times.   -Recommend dermatology consult to evaluate diffuse erythematous papules in legs   -+distal coolness to distal foot and toes, no overt signs of ischemia   -Topical recommendations for bilateral medial legs wounds: Clean with NS (clean in circular motions with NS and gauze). Pat dry. Apply liquid barrier film to periwound skin, allow to dry. Place non adherent dressing (Adaptic touch) over bases of wound, cover with Aquacel AG hydrofiber, cover with ABD pad and kerlix. Secure with light compression with ACE wraps (place ACE wraps from below toes to below knees), change daily or PRN if soiled.   -Recommend DELFINO/PVR, no pulses palpable in DP/PT. + femoral pulses   -If DELFINO abnormal then recommend vascular surgery consult   -Recommend US venous duplex to r/o DVT to bilateral lower legs  -BLE elevation as tolerated   FULL NOTE TO FOLLOW       Abx per Medicine      Continue turning and positioning w/ offloading assistive devices as per protocol  Continue w/ Pericare as per protocol  Waffle Cushion to chair when oob to chair  Continue w/ low air loss fluidized bed surface     Care as per medicine, will follow w/ you    Upon discharge f/u as outpatient at [facility] [address] [facility] [phone] patient would benefit from venous duplex studies   D/w team    Thank you for this consult  DONNA Pool-BC, CWTHADN (pager #[phone] or available in MS teams)    If after 4PM or before 7:30AM on Mon-Friday or weekend/holiday please contact general surgery for urgent matters.   Team A- [id number]   Team B- [id number]  For non-urgent matters e-mail [email or web address]    I spent 75 minutes face to face with this patient of which more than 50% of the time was spent counseling & coordinating care of this pt Assessment/Plan: Jaylon Antony is an 89 y.o. man with history of HTN, COPD, CKD, atrial fibrillation (Eliquis), CHF (EF 45-50%, combined systolic and diastolic),  and CVA who is currently admitted for CHF exacerbation. Surgery consultation prompted by RUQ US demonstrating a distended gallbladder and cholelithiasis. Patient seen by CT surgery.     Wound Consult requested to assist w/ management of bilateral leg wounds. Patient known to wound care team at another [facility] facility last seen on 12/29 for The left lower extremity below the knee has numerous (<1 cm) brown scabs, previously serous filled blisters. Left medial calf with large ruptured blister, draining scant serous fluid. The right lower extremity with same brown scabs, but also noted medially is a large area of deroofed blisters". Bacitracin ointment was recommended it and light compression.     Bilateral medial legs with unroof blisters  -No associated cellulitis   -+3 edema to bilateral legs equally   -Anterior lower leg and medial lower leg bilaterally with diffuse erythematous papules and non draining scabs secondary to? Patient endorses pruritis and admits to scratching at times.   -Recommend dermatology consult to evaluate diffuse erythematous papules in legs   -+distal coolness to distal foot and toes, no overt signs of ischemia   -Topical recommendations for bilateral medial legs wounds: Clean with NS (clean in circular motions with NS and gauze). Pat dry. Apply liquid barrier film to periwound skin, allow to dry. Place non adherent dressing (Adaptic touch) over bases of wound, cover with Aquacel AG hydrofiber, cover with ABD pad and kerlix. Secure with light compression with ACE wraps (place ACE wraps from below toes to below knees), change daily or PRN if soiled.   -Recommend DELFINO/PVR, no pulses palpable in DP/PT. + femoral pulses   -If DELFINO abnormal then recommend vascular surgery consult   -Recommend US venous duplex to r/o DVT to bilateral lower legs  -BLE elevation as tolerated   FULL NOTE TO FOLLOW       Abx per Medicine      Continue turning and positioning w/ offloading assistive devices as per protocol  Continue w/ Pericare as per protocol  Waffle Cushion to chair when oob to chair  Continue w/ low air loss fluidized bed surface     Care as per medicine, will follow w/ you    Upon discharge f/u as outpatient at [facility] [address] [facility] [phone] patient would benefit from venous duplex studies   D/w team    Thank you for this consult  DONNA Pool-BC, CWTHADN (pager #[phone] or available in MS teams)    If after 4PM or before 7:30AM on Mon-Friday or weekend/holiday please contact general surgery for urgent matters.   Team A- [id number]   Team B- [id number]  For non-urgent matters e-mail [email or web address]    I spent 75 minutes face to face with this patient of which more than 50% of the time was spent counseling & coordinating care of this pt Assessment/Plan: Jaylon Antony is an 89 y.o. man with history of HTN, COPD, CKD, atrial fibrillation (Eliquis), CHF (EF 45-50%, combined systolic and diastolic),  and CVA who is currently admitted for CHF exacerbation. Surgery consultation prompted by RUQ US demonstrating a distended gallbladder and cholelithiasis. Patient seen by CT surgery and general surgery.     Wound Consult requested to assist w/ management of bilateral leg wounds. Patient known to wound care team at another [facility] last seen on 12/29 for bilateral leg wounds. "The left lower extremity below the knee has numerous (<1 cm) brown scabs, previously serous filled blisters. Left medial calf with large ruptured blister, draining scant serous fluid. The right lower extremity with same brown scabs, but also noted medially is a large area of deroofed blisters". Bacitracin ointment was recommended it and light compression.     Bilateral medial legs with unroof blisters, suspect secondary to fluid overload, however would r/o underline arterial dx given area of eschar to right medial lower leg and no pulses palpable to DP/PT.   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 Patient endorses pruritis and admits to scratching at times.   -Recommend dermatology consult to evaluate diffuse erythematous papules in legs  -+distal coolness to distal foot/heels and toes, no overt signs of ischemia   -Moderate serous drainage, no odor   -No associated cellulitis   -Topical recommendations for bilateral medial legs wounds: Clean with NS (clean in circular motions with NS and gauze). Pat dry. Apply liquid barrier film to periwound skin, allow to dry. Place non adherent silicone layer dressing (Adaptic touch) over bases of wound, cover with Aquacel AG hydrofiber sheet, cover with ABD pad and kerlix, secure with paper tape. Change daily or PRN if soiled.   -DELFINO/PVR (1/2/24): Findings suggest the presence of multi-segment arterial disease in the right lower extremity, including significant small vessel arterial disease in the foot. Findings as per primary team   -Given abnormal DELFINO/PVR, recommend holding off compression with ACE wraps.   -Appreciate obtaining DELFINO/PVR, no pulses palpable in DP/PT. + femoral pulses   -Recommend vascular surgery consult   -Recommend US venous duplex to r/o DVT to bilateral lower legs  -BLE elevation as tolerated     Right medial lower leg intact fluid filled blister- Clean with NS. Pat dry. Apply liquid barrier film, cover with non adherent silicone layer (adaptic touch), cover with ABD Pad and Kerlix, place ACE wraps (light compression)  Change daily or prn   Right medial knee bulging skin discoloration appears as enlarge varicose vein?- Monitor for tissue type changes.       RUQ US demonstrating a distended gallbladder and cholelithiasis.   -Management as per primary team   -Abx per Medicine    Additional Recs   Moisture associated dermatitis in gluteal cleft and inner buttocks: Clean with skin cleanser. Pat dry. Apply a thin layer of Tonja barrier moisture cream, apply twice a day or prn if soiled.   Continue turning and positioning w/ offloading assistive devices as per protocol  Continue w/ Pericare as per protocol  Waffle Cushion to chair when oob to chair  Continue w/ low air loss fluidized bed surface     Care as per medicine, will follow w/ you sporadically during hospital stay. Please reconsult earlier of needed     Upon discharge f/u as outpatient at [facility] [address] [place], [place] [phone] patient would benefit from venous duplex studies   D/w team MD Renee VICTORIA     Thank you for this consult  DONNA Pool-BC, CWOCN (pager #[phone] or available in MS teams)    If after 4PM or before 7:30AM on Mon-Friday or weekend/holiday please contact general surgery for urgent matters.   Team A- [id number]   Team B- [id number]  For non-urgent matters e-mail [email or web address]    I spent 75 minutes face to face with this patient of which more than 50% of the time was spent counseling & coordinating care of this pt Assessment/Plan: Jaylon Antony is an 89 y.o. man with history of HTN, COPD, CKD, atrial fibrillation (Eliquis), CHF (EF 45-50%, combined systolic and diastolic),  and CVA who is currently admitted for CHF exacerbation. Surgery consultation prompted by RUQ US demonstrating a distended gallbladder and cholelithiasis. Patient seen by CT surgery and general surgery.     Wound Consult requested to assist w/ management of bilateral leg wounds. Patient known to wound care team at another [facility] last seen on 12/29 for bilateral leg wounds. "The left lower extremity below the knee has numerous (<1 cm) brown scabs, previously serous filled blisters. Left medial calf with large ruptured blister, draining scant serous fluid. The right lower extremity with same brown scabs, but also noted medially is a large area of deroofed blisters". Bacitracin ointment was recommended it and light compression.     Bilateral medial legs with unroof blisters, suspect secondary to fluid overload, however would r/o underline arterial dx given area of eschar to right medial lower leg and no pulses palpable to DP/PT.   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 Patient endorses pruritis and admits to scratching at times.   -Recommend dermatology consult to evaluate diffuse erythematous papules in legs  -+distal coolness to distal foot/heels and toes, no overt signs of ischemia   -Moderate serous drainage, no odor   -No associated cellulitis   -Topical recommendations for bilateral medial legs wounds: Clean with NS (clean in circular motions with NS and gauze). Pat dry. Apply liquid barrier film to periwound skin, allow to dry. Place non adherent silicone layer dressing (Adaptic touch) over bases of wound, cover with Aquacel AG hydrofiber sheet, cover with ABD pad and kerlix, secure with paper tape. Change daily or PRN if soiled.   -DELFINO/PVR (1/2/24): Findings suggest the presence of multi-segment arterial disease in the right lower extremity, including significant small vessel arterial disease in the foot. Findings as per primary team   -Given abnormal DELFINO/PVR, recommend holding off compression with ACE wraps.   -Appreciate obtaining DELFINO/PVR, no pulses palpable in DP/PT. + femoral pulses   -Recommend vascular surgery consult   -Recommend US venous duplex to r/o DVT to bilateral lower legs  -BLE elevation as tolerated     Right medial lower leg intact fluid filled blister- Clean with NS. Pat dry. Apply liquid barrier film, cover with non adherent silicone layer (adaptic touch), cover with ABD Pad and Kerlix, place ACE wraps (light compression)  Change daily or prn   Right medial knee bulging skin discoloration appears as enlarge varicose vein?- Monitor for tissue type changes.       RUQ US demonstrating a distended gallbladder and cholelithiasis.   -Management as per primary team   -Abx per Medicine    Additional Recs   Moisture associated dermatitis in gluteal cleft and inner buttocks: Clean with skin cleanser. Pat dry. Apply a thin layer of Tonja barrier moisture cream, apply twice a day or prn if soiled.   Continue turning and positioning w/ offloading assistive devices as per protocol  Continue w/ Pericare as per protocol  Waffle Cushion to chair when oob to chair  Continue w/ low air loss fluidized bed surface     Care as per medicine, will follow w/ you sporadically during hospital stay. Please reconsult earlier of needed     Upon discharge f/u as outpatient at [facility] [address] [place], [place] [phone] patient would benefit from venous duplex studies   D/w team MD Renee VICTORIA     Thank you for this consult  DONNA Pool-BC, CWOCN (pager #[phone] or available in MS teams)    If after 4PM or before 7:30AM on Mon-Friday or weekend/holiday please contact general surgery for urgent matters.   Team A- [id number]   Team B- [id number]  For non-urgent matters e-mail [email or web address]    I spent 75 minutes face to face with this patient of which more than 50% of the time was spent counseling & coordinating care of this pt Assessment/Plan: Jaylon Antony is an 89 y.o. man with history of HTN, COPD, CKD, atrial fibrillation (Eliquis), CHF (EF 45-50%, combined systolic and diastolic),  and CVA who is currently admitted for CHF exacerbation. Surgery consultation prompted by RUQ US demonstrating a distended gallbladder and cholelithiasis. Patient seen by CT surgery and general surgery.     Wound Consult requested to assist w/ management of bilateral leg wounds. Patient known to wound care team at another [facility] last seen on 12/29 for bilateral leg wounds. "The left lower extremity below the knee has numerous (<1 cm) brown scabs, previously serous filled blisters. Left medial calf with large ruptured blister, draining scant serous fluid. The right lower extremity with same brown scabs, but also noted medially is a large area of deroofed blisters". Bacitracin ointment was recommended it and light compression.     Bilateral medial legs with unroof blisters, suspect secondary to fluid overload, however would r/o underline arterial dx given area of eschar to right medial lower leg and no pulses palpable to DP/PT.   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 Patient endorses pruritis and admits to scratching at times.   -Recommend dermatology consult to evaluate diffuse erythematous papules in legs  -+distal coolness to distal foot/heels and toes, no overt signs of ischemia   -Moderate serous drainage, no odor   -No associated cellulitis   -Topical recommendations for bilateral medial legs wounds: Clean with NS (clean in circular motions with NS and gauze). Pat dry. Apply liquid barrier film to periwound skin, allow to dry. Place non adherent silicone layer dressing (Adaptic touch) over bases of wound, cover with Aquacel AG hydrofiber sheet, cover with ABD pad and kerlix, secure with paper tape. Change daily or PRN if soiled.   -DELFINO/PVR (1/2/24): Findings suggest the presence of multi-segment arterial disease in the right lower extremity, including significant small vessel arterial disease in the foot. Findings as per primary team   -Given abnormal DELFINO/PVR, recommend holding off compression with ACE wraps.   -Appreciate obtaining DELFINO/PVR, no pulses palpable in DP/PT. + femoral pulses   -Recommend vascular surgery consult   -Recommend US venous duplex to r/o DVT to bilateral lower legs  -BLE elevation as tolerated     Right medial lower leg intact fluid filled blister- Clean with NS. Pat dry. Apply liquid barrier film, cover with non adherent silicone layer (adaptic touch), cover with ABD Pad and Kerlix, secure with paper tape. Change daily or prn   Given decrease DELFINO/PVR results, would not recommend compression with ACE wraps at this time.   Right medial knee bulging skin discoloration appears as enlarge varicose vein?- Monitor for tissue type changes.       RUQ US demonstrating a distended gallbladder and cholelithiasis.   -Management as per primary team   -Abx per Medicine    Additional Recs   Moisture associated dermatitis in gluteal cleft and inner buttocks: Clean with skin cleanser. Pat dry. Apply a thin layer of Tonja barrier moisture cream, apply twice a day or prn if soiled.   Continue turning and positioning w/ offloading assistive devices as per protocol  Continue w/ Pericare as per protocol  Waffle Cushion to chair when oob to chair  Continue w/ low air loss fluidized bed surface     Care as per medicine, will follow w/ you sporadically during hospital stay. Please reconsult earlier of needed     Upon discharge f/u as outpatient at [facility] [address] [place], [place] [phone] patient would benefit from venous duplex studies   D/w team MD Renee VICTORIA     Thank you for this consult  Amena Duffy, AGNP-BC, CWOCN (pager #[phone] or available in MS teams)    If after 4PM or before 7:30AM on Mon-Friday or weekend/holiday please contact general surgery for urgent matters.   Team A- [id number]   Team B- [id number]  For non-urgent matters e-mail [email or web address]    I spent 75 minutes face to face with this patient of which more than 50% of the time was spent counseling & coordinating care of this pt Assessment/Plan: Jaylon Antony is an 89 y.o. man with history of HTN, COPD, CKD, atrial fibrillation (Eliquis), CHF (EF 45-50%, combined systolic and diastolic),  and CVA who is currently admitted for CHF exacerbation. Surgery consultation prompted by RUQ US demonstrating a distended gallbladder and cholelithiasis. Patient seen by CT surgery and general surgery.     Wound Consult requested to assist w/ management of bilateral leg wounds. Patient known to wound care team at another [facility] last seen on 12/29 for bilateral leg wounds. "The left lower extremity below the knee has numerous (<1 cm) brown scabs, previously serous filled blisters. Left medial calf with large ruptured blister, draining scant serous fluid. The right lower extremity with same brown scabs, but also noted medially is a large area of deroofed blisters". Bacitracin ointment was recommended it and light compression.     Bilateral medial legs with unroof blisters, suspect secondary to fluid overload, however would r/o underline arterial dx given area of eschar to right medial lower leg and no pulses palpable to DP/PT.   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 Patient endorses pruritis and admits to scratching at times.   -Recommend dermatology consult to evaluate diffuse erythematous papules in legs  -+distal coolness to distal foot/heels and toes, no overt signs of ischemia   -Moderate serous drainage, no odor   -No associated cellulitis   -Topical recommendations for bilateral medial legs wounds: Clean with NS (clean in circular motions with NS and gauze). Pat dry. Apply liquid barrier film to periwound skin, allow to dry. Place non adherent silicone layer dressing (Adaptic touch) over bases of wound, cover with Aquacel AG hydrofiber sheet, cover with ABD pad and kerlix, secure with paper tape. Change daily or PRN if soiled.   -DELFINO/PVR (1/2/24): Findings suggest the presence of multi-segment arterial disease in the right lower extremity, including significant small vessel arterial disease in the foot. Findings as per primary team   -Given abnormal DELFINO/PVR, recommend holding off compression with ACE wraps.   -Appreciate obtaining DELFINO/PVR, no pulses palpable in DP/PT. + femoral pulses   -Recommend vascular surgery consult   -Recommend US venous duplex to r/o DVT to bilateral lower legs  -BLE elevation as tolerated     Right medial lower leg intact fluid filled blister- Clean with NS. Pat dry. Apply liquid barrier film, cover with non adherent silicone layer (adaptic touch), cover with ABD Pad and Kerlix, secure with paper tape. Change daily or prn   Given decrease DELFINO/PVR results, would not recommend compression with ACE wraps at this time.   Right medial knee bulging skin discoloration appears as enlarge varicose vein?- Monitor for tissue type changes.       RUQ US demonstrating a distended gallbladder and cholelithiasis.   -Management as per primary team   -Abx per Medicine    Additional Recs   Moisture associated dermatitis in gluteal cleft and inner buttocks: Clean with skin cleanser. Pat dry. Apply a thin layer of Tonja barrier moisture cream, apply twice a day or prn if soiled.   Continue turning and positioning w/ offloading assistive devices as per protocol  Continue w/ Pericare as per protocol  Waffle Cushion to chair when oob to chair  Continue w/ low air loss fluidized bed surface     Care as per medicine, will follow w/ you sporadically during hospital stay. Please reconsult earlier of needed     Upon discharge f/u as outpatient at [facility] [address] [place], [place] [phone] patient would benefit from venous duplex studies   D/w team MD Renee VICTORIA     Thank you for this consult  Amena Duffy, AGNP-BC, CWOCN (pager #[phone] or available in MS teams)    If after 4PM or before 7:30AM on Mon-Friday or weekend/holiday please contact general surgery for urgent matters.   Team A- [id number]   Team B- [id number]  For non-urgent matters e-mail [email or web address]    I spent 75 minutes face to face with this patient of which more than 50% of the time was spent counseling & coordinating care of this pt

## 2024-01-02 NOTE — CONSULT NOTE ADULT - SUBJECTIVE AND OBJECTIVE BOX
General Surgery Consult      Consulting surgical team: ALEXANDER      HPI: Jaylon Antony is an 89 y.o. man with history of HTN, COPD, CKD, atrial fibrillation (Eliquis), CHF (EF 45-50%, combined systolic and diastolic),  and CVA who is currently admitted for CHF exacerbation. Surgery consultation prompted by RUQ US demonstrating a distended gallbladder and cholelithiasis.    Upon interview, the patient denies any abdominal pain, but does state that he has not had an appetite for several days. Patient denies any associated nausea or vomiting. Patient denies history of similar symptoms.       PAST MEDICAL HISTORY:  Afib  Congestive heart failure (CHF)  CVA (cerebral vascular accident)  Hypertension, unspecified type  Chronic obstructive pulmonary disease (COPD)      PAST SURGICAL HISTORY:  No significant past surgical history      MEDICATIONS:  acetaminophen 325 mg oral tablet: 2 tab(s) orally every 6 hours As needed Temp greater or equal to 38C (100.4F), Mild Pain (1 - 3) (02 Jan 2024 02:41)  albuterol 90 mcg/inh inhalation aerosol: 2 puff(s) inhaled every 6 hours As needed Shortness of Breath and/or Wheezing (02 Jan 2024 02:41)  allopurinol 100 mg oral tablet: 1 tab(s) orally once a day (02 Jan 2024 02:41)  apixaban 2.5 mg oral tablet: 1 tab(s) orally every 12 hours (02 Jan 2024 02:41)  aspirin 81 mg oral delayed release tablet: 1 tab(s) orally once a day (02 Jan 2024 02:41)  atorvastatin 40 mg oral tablet: 1 tab(s) orally once a day (at bedtime) (02 Jan 2024 02:41)  bacitracin 500 units/g topical ointment: 1 Apply topically to affected area once a day (02 Jan 2024 02:41)  budesonide-formoterol 160 mcg-4.5 mcg/inh inhalation aerosol: 2 puff(s) inhaled 2 times a day (02 Jan 2024 02:41)  diphenhydramine-zinc acetate 2%-0.1% topical cream: 1 Apply topically to affected area 2 times a day As needed Itching (02 Jan 2024 02:41)  furosemide 100 mg/100 mL-0.9% intravenous solution: 40 milliliter(s) intravenous once (02 Jan 2024 02:41)  melatonin 3 mg oral tablet: 1 tab(s) orally once a day (at bedtime) (02 Jan 2024 02:41)  metoprolol succinate 50 mg oral tablet, extended release: 1 tab(s) orally once a day (02 Jan 2024 02:41)  pantoprazole 40 mg oral delayed release tablet: 1 tab(s) orally once a day (before a meal) (02 Jan 2024 02:41)  polyethylene glycol 3350 oral powder for reconstitution: 17 gram(s) orally 2 times a day (02 Jan 2024 02:41)  senna leaf extract oral tablet: 2 tab(s) orally once a day (at bedtime) (02 Jan 2024 02:41)  tiotropium 2.5 mcg/inh inhalation aerosol: 2 puff(s) inhaled once a day (02 Jan 2024 02:41)      ALLERGIES:  No Known Allergies      VITALS & I/Os:  Vital Signs Last 24 Hrs  T(C): 36.6 (02 Jan 2024 01:50), Max: 36.6 (02 Jan 2024 01:50)  T(F): 97.8 (02 Jan 2024 01:50), Max: 97.8 (02 Jan 2024 01:50)  HR: 83 (02 Jan 2024 01:50) (60 - 83)  BP: 121/51 (02 Jan 2024 01:50) (117/69 - 136/76)  BP(mean): --  RR: 18 (02 Jan 2024 01:50) (16 - 18)  SpO2: 100% (02 Jan 2024 01:50) (92% - 100%)    Parameters below as of 02 Jan 2024 01:50  Patient On (Oxygen Delivery Method): nasal cannula      I&O's Summary  01 Jan 2024 07:01  -  02 Jan 2024 04:42  --------------------------------------------------------  IN: 0 mL / OUT: 250 mL / NET: -250 mL      PHYSICAL EXAM:  General: No acute distress  Respiratory: Nonlabored  Cardiovascular: normotensive, regular rate  Abdominal: Soft, moderately distended, nontender. No rebound or guarding. No organomegaly, no palpable mass.  Extremities: Warm      LABS:                        9.3    8.81  )-----------( 195      ( 01 Jan 2024 06:30 )             30.2     01-01    145  |  103  |  94<H>  ----------------------------<  106<H>  4.3   |  36<H>  |  1.54<H>    Ca    9.1      01 Jan 2024 06:30    TPro  7.5  /  Alb  2.6<L>  /  TBili  1.1  /  DBili  x   /  AST  316<H>  /  ALT  161<H>  /  AlkPhos  198<H>  01-01    Urinalysis Basic - ( 01 Jan 2024 06:30 )    Color: x / Appearance: x / SG: x / pH: x  Gluc: 106 mg/dL / Ketone: x  / Bili: x / Urobili: x   Blood: x / Protein: x / Nitrite: x   Leuk Esterase: x / RBC: x / WBC x   Sq Epi: x / Non Sq Epi: x / Bacteria: x      IMAGING:  RUQ US:  Liver: Within normal limits.  Bile ducts: Normal caliber. Common bile duct measures 4 mm.  Gallbladder: Distended with gallstones. Gallbladder wall thickening and   edema measuring up to 6 mm. Technologist reports positive sonographic   Engel sign.  Pancreas: Poorly visualized.  Right kidney: 10.5 cm. No hydronephrosis. Multiple cysts measuring up to   7.8 x 6.9 x 7.8 cm. Increased echogenicity.  Ascites: Trace.  IVC: Visualized portions are within normal limits.    IMPRESSION:  Findings suspicious for acute cholecystitis. Echogenic right kidney, suggestive of renal parenchymal disease.                                                                                               General Surgery Consult      Consulting surgical team: ALEXADNER      HPI: Jaylon Antony is an 89 y.o. man with history of HTN, COPD, CKD, atrial fibrillation (Eliquis), CHF (EF 45-50%, combined systolic and diastolic),  and CVA who is currently admitted for CHF exacerbation. Surgery consultation prompted by RUQ US demonstrating a distended gallbladder and cholelithiasis.    Upon interview, the patient denies any abdominal pain, but does state that he has not had an appetite for several days. Patient denies any associated nausea or vomiting. Patient denies history of similar symptoms.       PAST MEDICAL HISTORY:  Afib  Congestive heart failure (CHF)  CVA (cerebral vascular accident)  Hypertension, unspecified type  Chronic obstructive pulmonary disease (COPD)      PAST SURGICAL HISTORY:  No significant past surgical history      MEDICATIONS:  acetaminophen 325 mg oral tablet: 2 tab(s) orally every 6 hours As needed Temp greater or equal to 38C (100.4F), Mild Pain (1 - 3) (02 Jan 2024 02:41)  albuterol 90 mcg/inh inhalation aerosol: 2 puff(s) inhaled every 6 hours As needed Shortness of Breath and/or Wheezing (02 Jan 2024 02:41)  allopurinol 100 mg oral tablet: 1 tab(s) orally once a day (02 Jan 2024 02:41)  apixaban 2.5 mg oral tablet: 1 tab(s) orally every 12 hours (02 Jan 2024 02:41)  aspirin 81 mg oral delayed release tablet: 1 tab(s) orally once a day (02 Jan 2024 02:41)  atorvastatin 40 mg oral tablet: 1 tab(s) orally once a day (at bedtime) (02 Jan 2024 02:41)  bacitracin 500 units/g topical ointment: 1 Apply topically to affected area once a day (02 Jan 2024 02:41)  budesonide-formoterol 160 mcg-4.5 mcg/inh inhalation aerosol: 2 puff(s) inhaled 2 times a day (02 Jan 2024 02:41)  diphenhydramine-zinc acetate 2%-0.1% topical cream: 1 Apply topically to affected area 2 times a day As needed Itching (02 Jan 2024 02:41)  furosemide 100 mg/100 mL-0.9% intravenous solution: 40 milliliter(s) intravenous once (02 Jan 2024 02:41)  melatonin 3 mg oral tablet: 1 tab(s) orally once a day (at bedtime) (02 Jan 2024 02:41)  metoprolol succinate 50 mg oral tablet, extended release: 1 tab(s) orally once a day (02 Jan 2024 02:41)  pantoprazole 40 mg oral delayed release tablet: 1 tab(s) orally once a day (before a meal) (02 Jan 2024 02:41)  polyethylene glycol 3350 oral powder for reconstitution: 17 gram(s) orally 2 times a day (02 Jan 2024 02:41)  senna leaf extract oral tablet: 2 tab(s) orally once a day (at bedtime) (02 Jan 2024 02:41)  tiotropium 2.5 mcg/inh inhalation aerosol: 2 puff(s) inhaled once a day (02 Jan 2024 02:41)      ALLERGIES:  No Known Allergies      VITALS & I/Os:  Vital Signs Last 24 Hrs  T(C): 36.6 (02 Jan 2024 01:50), Max: 36.6 (02 Jan 2024 01:50)  T(F): 97.8 (02 Jan 2024 01:50), Max: 97.8 (02 Jan 2024 01:50)  HR: 83 (02 Jan 2024 01:50) (60 - 83)  BP: 121/51 (02 Jan 2024 01:50) (117/69 - 136/76)  BP(mean): --  RR: 18 (02 Jan 2024 01:50) (16 - 18)  SpO2: 100% (02 Jan 2024 01:50) (92% - 100%)    Parameters below as of 02 Jan 2024 01:50  Patient On (Oxygen Delivery Method): nasal cannula      I&O's Summary  01 Jan 2024 07:01  -  02 Jan 2024 04:42  --------------------------------------------------------  IN: 0 mL / OUT: 250 mL / NET: -250 mL      PHYSICAL EXAM:  General: No acute distress  Respiratory: Nonlabored  Cardiovascular: normotensive, regular rate  Abdominal: Soft, moderately distended, nontender. No rebound or guarding. No organomegaly, no palpable mass.  Extremities: Warm      LABS:                        9.3    8.81  )-----------( 195      ( 01 Jan 2024 06:30 )             30.2     01-01    145  |  103  |  94<H>  ----------------------------<  106<H>  4.3   |  36<H>  |  1.54<H>    Ca    9.1      01 Jan 2024 06:30    TPro  7.5  /  Alb  2.6<L>  /  TBili  1.1  /  DBili  x   /  AST  316<H>  /  ALT  161<H>  /  AlkPhos  198<H>  01-01    Urinalysis Basic - ( 01 Jan 2024 06:30 )    Color: x / Appearance: x / SG: x / pH: x  Gluc: 106 mg/dL / Ketone: x  / Bili: x / Urobili: x   Blood: x / Protein: x / Nitrite: x   Leuk Esterase: x / RBC: x / WBC x   Sq Epi: x / Non Sq Epi: x / Bacteria: x      IMAGING:  RUQ US:  Liver: Within normal limits.  Bile ducts: Normal caliber. Common bile duct measures 4 mm.  Gallbladder: Distended with gallstones. Gallbladder wall thickening and   edema measuring up to 6 mm. Technologist reports positive sonographic   Engel sign.  Pancreas: Poorly visualized.  Right kidney: 10.5 cm. No hydronephrosis. Multiple cysts measuring up to   7.8 x 6.9 x 7.8 cm. Increased echogenicity.  Ascites: Trace.  IVC: Visualized portions are within normal limits.    IMPRESSION:  Findings suspicious for acute cholecystitis. Echogenic right kidney, suggestive of renal parenchymal disease.

## 2024-01-02 NOTE — H&P ADULT - PROBLEM SELECTOR PLAN 5
Slowly downtrending since October ~11  - B12, folate, iron panel, retic % Slowly downtrending since October ~11  - B12, folate, iron panel, retic %  - CTM CBC

## 2024-01-02 NOTE — CONSULT NOTE ADULT - ASSESSMENT
Jaylon Antony is an 89 y.o. man with history of HTN, COPD, CKD, atrial fibrillation (Eliquis), CHF (EF 45-50%, combined systolic and diastolic),  and CVA who is currently admitted for CHF exacerbation. Surgery consultation prompted by RUQ US demonstrating a distended gallbladder and cholelithiasis.     PLAN:  - Patient is a poor surgical candidate; likely not a candidate for general anesthesia  - In the setting of CHF exacerbation, it remains unclear if patient has acute cholecystitis; wall thickening can also be attributable to CHF  - Recommend initiation of antibiotics  - Recommend consultation with IR for percutaneous cholecystotomy    B-team Surgery  [id number]   Jaylon Antony is an 89 y.o. man with history of HTN, COPD, CKD, atrial fibrillation (Eliquis), CHF (EF 45-50%, combined systolic and diastolic),  and CVA who is currently admitted for CHF exacerbation. Surgery consultation prompted by RUQ US demonstrating a distended gallbladder and cholelithiasis.     PLAN:  - Patient is a poor surgical candidate; likely not a candidate for general anesthesia  - In the setting of CHF exacerbation, it remains unclear if patient has acute cholecystitis; wall thickening can also be attributable to CHF  - Recommend initiation of antibiotics  - Recommend consultation with IR for percutaneous cholecystotomy    B-team Surgery  [id number]   Jaylon Antony is an 89 y.o. man with history of HTN, COPD, CKD, atrial fibrillation (Eliquis), CHF (EF 45-50%, combined systolic and diastolic),  and CVA who is currently admitted for CHF exacerbation. Surgery consultation prompted by RUQ US demonstrating a distended gallbladder and cholelithiasis.     PLAN:  - Patient is a poor surgical candidate; likely not a candidate for general anesthesia  - In the setting of CHF exacerbation, it remains unclear if patient has acute cholecystitis; wall thickening can also be attributable to CHF  - Recommend HIDA scan and abx if suspicious for cholecystitis  - If HIDA positive would consult IR for percutaneous cholecystostomy tube    Discussed w/ Dr. Bonilla  B-team Surgery  [id number]   Jaylon Antony is an 89 y.o. man with history of HTN, COPD, CKD, atrial fibrillation (Eliquis), CHF (EF 45-50%, combined systolic and diastolic),  and CVA who is currently admitted for CHF exacerbation. Surgery consultation prompted by RUQ US demonstrating a distended gallbladder and cholelithiasis.     PLAN:  - Patient is a poor surgical candidate; likely not a candidate for general anesthesia  - In the setting of CHF exacerbation, it remains unclear if patient has acute cholecystitis; wall thickening can also be attributable to CHF  - Recommend HIDA scan and abx if suspicious for cholecystitis  - If HIDA positive would consult IR for percutaneous cholecystostomy tube    Discussed w/ Dr. Bonilla  B-team Surgery  [id number]

## 2024-01-02 NOTE — PROGRESS NOTE ADULT - PROBLEM SELECTOR PLAN 10
DVT: Hold chemical for now given hemothorax and pending repeat CBC, SCDs  Diet: Low sodium, fluid restriction  Dispo: Pending PT DVT: Hold chemical for now given hemothorax   Diet: Low sodium, fluid restriction  Dispo: PT recommended rehab

## 2024-01-02 NOTE — DIETITIAN INITIAL EVALUATION ADULT - PROBLEM SELECTOR PLAN 2
- S/P thoracentesis with 1 L removal (described as "blood") 12/19 - exudative, large amount of RBCs noted  - Cytopath negative for malignancy  - Transferred to [facility] for thoracic evaluation for pleurX catheter and pleuroscopy; seen by CT surgery and will re-evaluate in AM  - Hold AC iso ?hemothorax - S/P thoracentesis with 1 L removal (described as "blood") 12/19 - exudative, large amount of RBCs noted  - Cytopath negative for malignancy  - Transferred to [facility] for thoracic evaluation for pleurX catheter and pleuroscopy; seen by CT surgery and will re-evaluate in AM  - Hold AC iso ?hemothorax

## 2024-01-02 NOTE — DIETITIAN INITIAL EVALUATION ADULT - PROBLEM SELECTOR PLAN 10
DVT: Hold chemical for now given hemothorax and pending repeat CBC, SCDs  Diet: Low sodium, fluid restriction  Dispo: Pending PT

## 2024-01-02 NOTE — PHYSICAL THERAPY INITIAL EVALUATION ADULT - NSPTDISCHREC_GEN_A_CORE
Restorative rehab to improve strength, balance, and return to previous level of functional mobility.

## 2024-01-02 NOTE — H&P ADULT - PROBLEM SELECTOR PLAN 3
- Non-emergent surgery as no signs of HD instability  - Not a good surgical candidate 2/2 pleural effusion, will consult IR regarding cholecystostomy drain  - Antibiotics?   - Avoid opioids - Surgery consult  - Ceftriaxone/flagyl   - Blood cultures x2, urine culture  - Lipase in AM  - Avoid opioids

## 2024-01-02 NOTE — PHYSICAL THERAPY INITIAL EVALUATION ADULT - GENERAL OBSERVATIONS, REHAB EVAL
Pt encountered in seated in chair in NAD, all lines intact, a&ox4, SPO2 100%, +NC at 2L, and RN Vicki aware.

## 2024-01-02 NOTE — DIETITIAN INITIAL EVALUATION ADULT - FACTORS AFF FOOD INTAKE
persistent lack of appetite/Mu-ism/ethnic/cultural/personal food preferences persistent lack of appetite/Anglican/ethnic/cultural/personal food preferences

## 2024-01-02 NOTE — DIETITIAN INITIAL EVALUATION ADULT - NAME AND PHONE
Nanci Samuels MS RD CDN #[id number], available on [employer].  Nanci Samuels MS RD CDN #[id number], available on [employer].

## 2024-01-02 NOTE — H&P ADULT - PROBLEM SELECTOR PLAN 4
-C/w rate control with metoprolol  -On eliquis -> heparin? -C/w rate control with metoprolol  -On eliquis, will hold ISO procedures and ?hemothorax - will need to discuss risks/benefits with patient and family of continuing AC

## 2024-01-02 NOTE — DIETITIAN INITIAL EVALUATION ADULT - REASON FOR ADMISSION
Encounter for adjustment or management of other cardiac device.  Per 1/2/24 internal medicine chart review, Patient is a 89 years old man with PMH of afib on Eliquis, b/l Le blisters, CHF (EF 45-50%, combined systolic and diastolic), CVA, HTN, COPD, CKD3, recent hospitalization for CHF exacerbation 11/22-11/27 where he left AMA, presenting with concern for LE blisters, found to have acute on chronic CHF, large pleural effusion s/p 1L removal, transferred from  for pleurX placement.

## 2024-01-03 NOTE — PROGRESS NOTE ADULT - PROBLEM SELECTOR PLAN 9
- BLE open blisters not improving  - Wound care recs appreciated: adaptic dressing applied to wounds and changed every 3 days. Use bacitracin everyday over the wounds and ACE wrap both legs to help decrease swelling  - Cont wound care

## 2024-01-03 NOTE — PROGRESS NOTE ADULT - PROBLEM SELECTOR PLAN 7
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

## 2024-01-03 NOTE — PROGRESS NOTE ADULT - ASSESSMENT
Mr. Antony is an 89 YO man with PMH of afib on Eliquis, b/l Le blisters, CHF (EF 45-50%, combined systolic and diastolic), CVA, HTN, COPD, CKD3, recent hospitalization for CHF exacerbation 11/22-11/27 where he left AMA, presenting with concern for LE blisters, found to have acute on chronic CHF, large pleural effusion s/p 1L removal, transferred from  for pleurX placement.  Mr. Antony is an 88 YO man with PMH of afib on Eliquis, b/l Le blisters, CHF (EF 45-50%, combined systolic and diastolic), CVA, HTN, COPD, CKD3, recent hospitalization for CHF exacerbation 11/22-11/27 where he left AMA, presenting with concern for LE blisters, found to have acute on chronic CHF, large pleural effusion s/p 1L removal, transferred from  for pleurX placement.  Mr. Antony is an 88 YO man with PMH of afib on Eliquis, b/l Le blisters, CHF (EF 45-50%, combined systolic and diastolic), CVA, HTN, COPD, CKD3, recent hospitalization for CHF exacerbation 11/22-11/27 where he left AMA, presenting with concern for LE blisters, found to have acute on chronic CHF, large exudative pleural effusion s/p 1L removal, transferred from  for thoracic surgery evaluation. Mr. Antony is an 89 YO man with PMH of afib on Eliquis, b/l Le blisters, CHF (EF 45-50%, combined systolic and diastolic), CVA, HTN, COPD, CKD3, recent hospitalization for CHF exacerbation 11/22-11/27 where he left AMA, presenting with concern for LE blisters, found to have acute on chronic CHF, large exudative pleural effusion s/p 1L removal, transferred from  for thoracic surgery evaluation.

## 2024-01-03 NOTE — DISCHARGE NOTE PROVIDER - HOSPITAL COURSE
[email or web address] History of Present Illness:   Mr. Antony is an 88 YO man with PMH of afib on Eliquis, b/l Le blisters, CHF (EF 45-50%, combined systolic and diastolic), CVA, HTN, COPD, CKD3, recent hospitalization for CHF exacerbation 11/22-11/27 where he left AMA, presenting with concern for LE blisters. He notes constipation and loss of appetite.    In [place], patient was found to have CT with large left sided loculated effusion, cardiomegaly with BNP 2.8K. Echo showed LVEF 50-55%, moderate mitral stenosis. He was hypoxic on RA to 89%. Cardiology was consulted and recommended diuresis. Pulmonology was consulted for thoracentesis, which was done and 1L removed. His symptoms improved and pulmonology recommended transfer to [facility] for placement of pleurX with thoracic surgery.     On the day of transfer was noted to have cholecystitis on RUQ ultrasound. Surgery was consulted and felt that patient is a poor surgical candidate, may benefit from a cholecystostomy drain.     On arrival, patient still with shortness of breath, unable to lie flat on his back. He experiences chest pain if he moves when standing. He is constipated, last BM yesterday. He has had a "normal" amount of urine output.     Hospital Course:   1/2: Transferred from [facility] because of L pleural effusion s/p 1 L removal. Thoracic surg consulted for Pleurx catheter placement. Also found to have acute cholecystitis at [facility] detected on RUQ US scan. HIDA ordered to confirm acute meng. Wound care evaluated lower extremity wounds, recommended derm consult for papules, consulted. Increased Lasix to 40 bid. DELFINO w/ severe arterial disease.  1/3: Lasix was stopped due to rise in creatine, BUN, and phosphorus. IR was consulted based on Thoracic surg recommendation to place pigtail catheter. HIDA scan came back negative. Vascular surgery was consulted because of low DELFINO. Derm believes that lower extremity wounds are due to stasis dermatitis with edema bulla. Recommended topical triamcinolone.   1/4: IRINEO improved; creatinine and BUN came down to patient's baseline levels. IR no longer is recommending pigtail catheter placement because the patient isn't dyspneic anymore. Patient restarted on home diuretic medication, torsemide. Vascular surgery does not recommend any surgery at this time for PAD. DVT prophylaxis will be ordered since procedures are no longer being considered. Derm consulted for skin lesions within the intergluteal folds and R posterior thigh. History of Present Illness:   Mr. Antony is an 89 YO man with PMH of afib on Eliquis, b/l Le blisters, CHF (EF 45-50%, combined systolic and diastolic), CVA, HTN, COPD, CKD3, recent hospitalization for CHF exacerbation 11/22-11/27 where he left AMA, presenting with concern for LE blisters. He notes constipation and loss of appetite.    In [place], patient was found to have CT with large left sided loculated effusion, cardiomegaly with BNP 2.8K. Echo showed LVEF 50-55%, moderate mitral stenosis. He was hypoxic on RA to 89%. Cardiology was consulted and recommended diuresis. Pulmonology was consulted for thoracentesis, which was done and 1L removed. His symptoms improved and pulmonology recommended transfer to [facility] for placement of pleurX with thoracic surgery.     On the day of transfer was noted to have cholecystitis on RUQ ultrasound. Surgery was consulted and felt that patient is a poor surgical candidate, may benefit from a cholecystostomy drain.     On arrival, patient still with shortness of breath, unable to lie flat on his back. He experiences chest pain if he moves when standing. He is constipated, last BM yesterday. He has had a "normal" amount of urine output.     Hospital Course:   1/2: Transferred from [facility] because of L pleural effusion s/p 1 L removal. Thoracic surg consulted for Pleurx catheter placement. Also found to have acute cholecystitis at [facility] detected on RUQ US scan. HIDA ordered to confirm acute meng. Wound care evaluated lower extremity wounds, recommended derm consult for papules, consulted. Increased Lasix to 40 bid. DELFINO w/ severe arterial disease.  1/3: Lasix was stopped due to rise in creatine, BUN, and phosphorus. IR was consulted based on Thoracic surg recommendation to place pigtail catheter. HIDA scan came back negative. Vascular surgery was consulted because of low DELFINO. Derm believes that lower extremity wounds are due to stasis dermatitis with edema bulla. Recommended topical triamcinolone.   1/4: IRINEO improved; creatinine and BUN came down to patient's baseline levels. IR no longer is recommending pigtail catheter placement because the patient isn't dyspneic anymore. Patient restarted on home diuretic medication, torsemide. Vascular surgery does not recommend any surgery at this time for PAD. DVT prophylaxis will be ordered since procedures are no longer being considered. Derm consulted for skin lesions within the intergluteal folds and R posterior thigh. History of Present Illness:   Mr. Antony is an 89 YO man with PMH of afib on Eliquis, b/l Le blisters, CHF (EF 45-50%, combined systolic and diastolic), CVA, HTN, COPD, CKD3, recent hospitalization for CHF exacerbation 11/22-11/27 where he left AMA, presenting with concern for LE blisters. He notes constipation and loss of appetite.    In [place], patient was found to have CT with large left sided loculated effusion, cardiomegaly with BNP 2.8K. Echo showed LVEF 50-55%, moderate mitral stenosis. He was hypoxic on RA to 89%. Cardiology was consulted and recommended diuresis. Pulmonology was consulted for thoracentesis, which was done and 1L removed. His symptoms improved and pulmonology recommended transfer to [facility] for placement of pleurX with thoracic surgery.     On the day of transfer was noted to have cholecystitis on RUQ ultrasound. Surgery was consulted and felt that patient is a poor surgical candidate, may benefit from a cholecystostomy drain.     On arrival, patient still with shortness of breath, unable to lie flat on his back. He experiences chest pain if he moves when standing. He is constipated, last BM yesterday. He has had a "normal" amount of urine output.     Hospital Course:   Transferred from [facility] because of L pleural effusion s/p 1 L removal. Thoracic surg consulted for Pleurx catheter placement but recommended IR for pigtail. Interventional radiology did not recommend pigtail catheter as pt was stable on room air. Also found to have acute cholecystitis at [facility] detected on RUQ US scan. HIDA ordered to confirm acute meng which was negative. Wound care evaluated lower extremity wounds, recommended derm consult for papules, consulted. Increased Lasix to 40 bid. DELFINO w/ severe arterial disease, however pt not a surgical candidate. Lasix was stopped due to rise in creatine, BUN, and phosphorus. Derm believes that lower extremity wounds are due to stasis dermatitis with edema bulla. Recommended topical triamcinolone on leg bumps. IRINEO improved; creatinine and BUN came down to patient's baseline levels. IR no longer is recommending pigtail catheter placement because the patient isn't dyspneic anymore. Patient restarted on home diuretic medication, torsemide.   Pt will have close follow up with cardiology and may see pulmonologist for pleural effusion.    Wound Care recommendations:    Topical recommendations for bilateral medial legs wounds: Clean with NS (clean in circular motions with NS and gauze). Pat dry. Apply liquid barrier film to periwound skin, allow to dry. Place non adherent silicone layer dressing (Adaptic touch) over bases of wound, cover with [employer] hydrofiber sheet, cover with ABD pad and kerlix, secure with paper tape. Change daily or PRN if soiled.     Right medial lower leg intact fluid filled blister- Clean with NS. Pat dry. Apply liquid barrier film, cover with non adherent silicone layer (adaptic touch), cover with ABD Pad and Kerlix, secure with paper tape. Change daily or prn     Moisture associated dermatitis in gluteal cleft and inner buttocks: Clean with skin cleanser. Pat dry. Apply a thin layer of Tonja barrier moisture cream, apply twice a day or prn if soiled. History of Present Illness:   Mr. Antony is an 89 YO man with PMH of afib on Eliquis, b/l Le blisters, CHF (EF 45-50%, combined systolic and diastolic), CVA, HTN, COPD, CKD3, recent hospitalization for CHF exacerbation 11/22-11/27 where he left AMA, presenting with concern for LE blisters. He notes constipation and loss of appetite.    In [place], patient was found to have CT with large left sided loculated effusion, cardiomegaly with BNP 2.8K. Echo showed LVEF 50-55%, moderate mitral stenosis. He was hypoxic on RA to 89%. Cardiology was consulted and recommended diuresis. Pulmonology was consulted for thoracentesis, which was done and 1L removed. His symptoms improved and pulmonology recommended transfer to [facility] for placement of pleurX with thoracic surgery.     On the day of transfer was noted to have cholecystitis on RUQ ultrasound. Surgery was consulted and felt that patient is a poor surgical candidate, may benefit from a cholecystostomy drain.     On arrival, patient still with shortness of breath, unable to lie flat on his back. He experiences chest pain if he moves when standing. He is constipated, last BM yesterday. He has had a "normal" amount of urine output.     Hospital Course:   Transferred from [facility] because of L pleural effusion s/p 1 L removal. Thoracic surg consulted for Pleurx catheter placement but recommended IR for pigtail. Interventional radiology did not recommend pigtail catheter as pt was stable on room air. Also found to have acute cholecystitis at [facility] detected on RUQ US scan. HIDA ordered to confirm acute meng which was negative. Wound care evaluated lower extremity wounds, recommended derm consult for papules, consulted. Increased Lasix to 40 bid. DELFINO w/ severe arterial disease, however pt not a surgical candidate. Lasix was stopped due to rise in creatine, BUN, and phosphorus. Derm believes that lower extremity wounds are due to stasis dermatitis with edema bulla. Recommended topical triamcinolone on leg bumps. IRINEO improved; creatinine and BUN came down to patient's baseline levels. IR no longer is recommending pigtail catheter placement because the patient isn't dyspneic anymore. Patient restarted on home diuretic medication, torsemide.   Pt will have close follow up with cardiology and may see pulmonologist for pleural effusion.    Wound Care recommendations:    Topical recommendations for bilateral medial legs wounds: Clean with NS (clean in circular motions with NS and gauze). Pat dry. Apply liquid barrier film to periwound skin, allow to dry. Place non adherent silicone layer dressing (Adaptic touch) over bases of wound, cover with Aquacel [employer] hydrofiber sheet, cover with ABD pad and kerlix, secure with paper tape. Change daily or PRN if soiled.     Right medial lower leg intact fluid filled blister- Clean with NS. Pat dry. Apply liquid barrier film, cover with non adherent silicone layer (adaptic touch), cover with ABD Pad and Kerlix, secure with paper tape. Change daily or prn     Moisture associated dermatitis in gluteal cleft and inner buttocks: Clean with skin cleanser. Pat dry. Apply a thin layer of Tonja barrier moisture cream, apply twice a day or prn if soiled.

## 2024-01-03 NOTE — CONSULT NOTE ADULT - SUBJECTIVE AND OBJECTIVE BOX
HPI:  Mr. Antony is an 88 YO man with PMH of afib on Eliquis, b/l Le blisters, CHF (EF 45-50%, combined systolic and diastolic), CVA, HTN, COPD, CKD3, recent hospitalization for CHF exacerbation 11/22-11/27 where he left AMA, presenting with concern for LE blisters. He notes constipation and loss of appetite.    In [place], patient was found to have CT with large left sided loculated effusion, cardiomegaly with BNP 2.8K. Echo showed LVEF 50-55%, moderate mitral stenosis. He was hypoxic on RA to 89%. Cardiology was consulted and recommended diuresis. Pulmonology was consulted for thoracentesis, which was done and 1L removed. His symptoms improved and pulmonology recommended transfer to [facility] for placement of pleurX with thoracic surgery.     On the day of transfer was noted to have cholecystitis on RUQ ultrasound. Surgery was consulted and felt that patient is a poor surgical candidate, may benefit from a cholecystostomy drain.     On arrival, patient still with shortness of breath, unable to lie flat on his back. He experiences chest pain if he moves when standing. He is constipated, last BM yesterday. He has had a "normal" amount of urine output.  (02 Jan 2024 02:31)      Derm Hx: pt has had rash and itchy pink bumps on lower legs for many months. also itchy on back. no oral lesions.     PAST MEDICAL & SURGICAL HISTORY:  Afib      Congestive heart failure (CHF)      CVA (cerebral vascular accident)      Hypertension, unspecified type      Chronic obstructive pulmonary disease (COPD)      No significant past surgical history          REVIEW OF SYSTEMS:  General: no fevers/chills; no lethargy  Skin/Breast: see HPI  Ophthalmologic: no eye pain or changes in vision  ENT: no dysphagia or changes in hearing  Respiratory: no SOB or cough  Cardiovascular: no palpitations or chest pain  Gastrointestinal: no abdominal pain or blood in stool  Genitourinary: no dysuria or frequency  Musculoskeletal: no joint pains or weakness  Neurological: no weakness, numbness, or tingling    MEDICATIONS  (STANDING):  allopurinol 100 milliGRAM(s) Oral daily  aspirin enteric coated 81 milliGRAM(s) Oral daily  atorvastatin 40 milliGRAM(s) Oral at bedtime  budesonide 160 MICROgram(s)/formoterol 4.5 MICROgram(s) Inhaler 2 Puff(s) Inhalation two times a day  dapagliflozin 10 milliGRAM(s) Oral daily  heparin   Injectable 5000 Unit(s) SubCutaneous every 12 hours  melatonin 3 milliGRAM(s) Oral at bedtime  metoprolol succinate ER 50 milliGRAM(s) Oral daily  pantoprazole    Tablet 40 milliGRAM(s) Oral before breakfast  polyethylene glycol 3350 17 Gram(s) Oral two times a day  senna 2 Tablet(s) Oral at bedtime  tiotropium 2.5 MICROgram(s) Inhaler 2 Puff(s) Inhalation daily    MEDICATIONS  (PRN):  acetaminophen     Tablet .. 650 milliGRAM(s) Oral every 6 hours PRN Temp greater or equal to 38C (100.4F), Mild Pain (1 - 3)  albuterol    90 MICROgram(s) HFA Inhaler 2 Puff(s) Inhalation every 6 hours PRN Shortness of Breath and/or Wheezing  aluminum hydroxide/magnesium hydroxide/simethicone Suspension 30 milliLiter(s) Oral every 4 hours PRN Dyspepsia  ondansetron Injectable 4 milliGRAM(s) IV Push every 8 hours PRN Nausea and/or Vomiting      Allergies    No Known Allergies    Intolerances        SOCIAL HISTORY:     hx smoking  non-smoker    FAMILY HISTORY:      Vital Signs Last 24 Hrs  T(C): 36.4 (03 Jan 2024 16:31), Max: 36.6 (03 Jan 2024 05:22)  T(F): 97.5 (03 Jan 2024 16:31), Max: 97.8 (03 Jan 2024 05:22)  HR: 65 (03 Jan 2024 16:31) (65 - 98)  BP: 119/56 (03 Jan 2024 16:31) (115/61 - 136/65)  BP(mean): --  RR: 18 (03 Jan 2024 16:31) (18 - 19)  SpO2: 100% (03 Jan 2024 16:31) (93% - 100%)    Parameters below as of 03 Jan 2024 16:31  Patient On (Oxygen Delivery Method): nasal cannula  O2 Flow (L/min): 2      PHYSICAL EXAM:  The patient was AOx3, well nourished, and in NAD.  OP showed no ulcerations.  There was no visible LAD.  Conjunctiva were non-injected..   The scalp, hair, face, eyebrows, lips, OP, neck, chest, back, extremities x4, and nails were examined.  There was no hyperhidrosis or bromhidrosis.     Of note on skin exam:   - polygonal pink papules w/o epidermal changes on bl lower legs - some in a linear (?koebnerized) distribution  - hemorrhagic bulla on bl lower legs      LABS:                        9.2    7.40  )-----------( 201      ( 03 Jan 2024 06:46 )             30.1     01-03    144  |  98  |  102<H>  ----------------------------<  97  4.1   |  30  |  1.72<H>    Ca    8.9      03 Jan 2024 06:46  Phos  4.8     01-03  Mg     2.90     01-03    TPro  7.3  /  Alb  3.3  /  TBili  0.5  /  DBili  x   /  AST  105<H>  /  ALT  125<H>  /  AlkPhos  183<H>  01-03    PT/INR - ( 03 Jan 2024 06:46 )   PT: 13.1 sec;   INR: 1.18 ratio         PTT - ( 03 Jan 2024 06:46 )  PTT:32.4 sec  Urinalysis Basic - ( 03 Jan 2024 06:46 )    Color: x / Appearance: x / SG: x / pH: x  Gluc: 97 mg/dL / Ketone: x  / Bili: x / Urobili: x   Blood: x / Protein: x / Nitrite: x   Leuk Esterase: x / RBC: x / WBC x   Sq Epi: x / Non Sq Epi: x / Bacteria: x        RADIOLOGY & ADDITIONAL STUDIES: reviewed; no pertinent findings HPI:  Mr. Antony is an 89 YO man with PMH of afib on Eliquis, b/l Le blisters, CHF (EF 45-50%, combined systolic and diastolic), CVA, HTN, COPD, CKD3, recent hospitalization for CHF exacerbation 11/22-11/27 where he left AMA, presenting with concern for LE blisters. He notes constipation and loss of appetite.    In [place], patient was found to have CT with large left sided loculated effusion, cardiomegaly with BNP 2.8K. Echo showed LVEF 50-55%, moderate mitral stenosis. He was hypoxic on RA to 89%. Cardiology was consulted and recommended diuresis. Pulmonology was consulted for thoracentesis, which was done and 1L removed. His symptoms improved and pulmonology recommended transfer to [facility] for placement of pleurX with thoracic surgery.     On the day of transfer was noted to have cholecystitis on RUQ ultrasound. Surgery was consulted and felt that patient is a poor surgical candidate, may benefit from a cholecystostomy drain.     On arrival, patient still with shortness of breath, unable to lie flat on his back. He experiences chest pain if he moves when standing. He is constipated, last BM yesterday. He has had a "normal" amount of urine output.  (02 Jan 2024 02:31)      Derm Hx: pt has had rash and itchy pink bumps on lower legs for many months. also itchy on back. no oral lesions.     PAST MEDICAL & SURGICAL HISTORY:  Afib      Congestive heart failure (CHF)      CVA (cerebral vascular accident)      Hypertension, unspecified type      Chronic obstructive pulmonary disease (COPD)      No significant past surgical history          REVIEW OF SYSTEMS:  General: no fevers/chills; no lethargy  Skin/Breast: see HPI  Ophthalmologic: no eye pain or changes in vision  ENT: no dysphagia or changes in hearing  Respiratory: no SOB or cough  Cardiovascular: no palpitations or chest pain  Gastrointestinal: no abdominal pain or blood in stool  Genitourinary: no dysuria or frequency  Musculoskeletal: no joint pains or weakness  Neurological: no weakness, numbness, or tingling    MEDICATIONS  (STANDING):  allopurinol 100 milliGRAM(s) Oral daily  aspirin enteric coated 81 milliGRAM(s) Oral daily  atorvastatin 40 milliGRAM(s) Oral at bedtime  budesonide 160 MICROgram(s)/formoterol 4.5 MICROgram(s) Inhaler 2 Puff(s) Inhalation two times a day  dapagliflozin 10 milliGRAM(s) Oral daily  heparin   Injectable 5000 Unit(s) SubCutaneous every 12 hours  melatonin 3 milliGRAM(s) Oral at bedtime  metoprolol succinate ER 50 milliGRAM(s) Oral daily  pantoprazole    Tablet 40 milliGRAM(s) Oral before breakfast  polyethylene glycol 3350 17 Gram(s) Oral two times a day  senna 2 Tablet(s) Oral at bedtime  tiotropium 2.5 MICROgram(s) Inhaler 2 Puff(s) Inhalation daily    MEDICATIONS  (PRN):  acetaminophen     Tablet .. 650 milliGRAM(s) Oral every 6 hours PRN Temp greater or equal to 38C (100.4F), Mild Pain (1 - 3)  albuterol    90 MICROgram(s) HFA Inhaler 2 Puff(s) Inhalation every 6 hours PRN Shortness of Breath and/or Wheezing  aluminum hydroxide/magnesium hydroxide/simethicone Suspension 30 milliLiter(s) Oral every 4 hours PRN Dyspepsia  ondansetron Injectable 4 milliGRAM(s) IV Push every 8 hours PRN Nausea and/or Vomiting      Allergies    No Known Allergies    Intolerances        SOCIAL HISTORY:     hx smoking  non-smoker    FAMILY HISTORY:      Vital Signs Last 24 Hrs  T(C): 36.4 (03 Jan 2024 16:31), Max: 36.6 (03 Jan 2024 05:22)  T(F): 97.5 (03 Jan 2024 16:31), Max: 97.8 (03 Jan 2024 05:22)  HR: 65 (03 Jan 2024 16:31) (65 - 98)  BP: 119/56 (03 Jan 2024 16:31) (115/61 - 136/65)  BP(mean): --  RR: 18 (03 Jan 2024 16:31) (18 - 19)  SpO2: 100% (03 Jan 2024 16:31) (93% - 100%)    Parameters below as of 03 Jan 2024 16:31  Patient On (Oxygen Delivery Method): nasal cannula  O2 Flow (L/min): 2      PHYSICAL EXAM:  The patient was AOx3, well nourished, and in NAD.  OP showed no ulcerations.  There was no visible LAD.  Conjunctiva were non-injected..   The scalp, hair, face, eyebrows, lips, OP, neck, chest, back, extremities x4, and nails were examined.  There was no hyperhidrosis or bromhidrosis.     Of note on skin exam:   - polygonal pink papules w/o epidermal changes on bl lower legs - some in a linear (?koebnerized) distribution  - hemorrhagic bulla on bl lower legs      LABS:                        9.2    7.40  )-----------( 201      ( 03 Jan 2024 06:46 )             30.1     01-03    144  |  98  |  102<H>  ----------------------------<  97  4.1   |  30  |  1.72<H>    Ca    8.9      03 Jan 2024 06:46  Phos  4.8     01-03  Mg     2.90     01-03    TPro  7.3  /  Alb  3.3  /  TBili  0.5  /  DBili  x   /  AST  105<H>  /  ALT  125<H>  /  AlkPhos  183<H>  01-03    PT/INR - ( 03 Jan 2024 06:46 )   PT: 13.1 sec;   INR: 1.18 ratio         PTT - ( 03 Jan 2024 06:46 )  PTT:32.4 sec  Urinalysis Basic - ( 03 Jan 2024 06:46 )    Color: x / Appearance: x / SG: x / pH: x  Gluc: 97 mg/dL / Ketone: x  / Bili: x / Urobili: x   Blood: x / Protein: x / Nitrite: x   Leuk Esterase: x / RBC: x / WBC x   Sq Epi: x / Non Sq Epi: x / Bacteria: x        RADIOLOGY & ADDITIONAL STUDIES: reviewed; no pertinent findings

## 2024-01-03 NOTE — PROGRESS NOTE ADULT - ASSESSMENT
89 y.o. man with history of HTN, COPD, CKD, atrial fibrillation (Eliquis), CHF (EF 45-50%, combined systolic and diastolic),  and CVA who is currently admitted for CHF exacerbation. Surgery consultation prompted by RUQ US demonstrating a distended gallbladder and cholelithiasis.     PLAN:  - Patient is a poor surgical candidate; likely not a candidate for general anesthesia  - HIDA negative; no acute cholecystitis  - surgery to sign off    B-team Surgery  [id number]   89 y.o. man with history of HTN, COPD, CKD, atrial fibrillation (Eliquis), CHF (EF 45-50%, combined systolic and diastolic),  and CVA who is currently admitted for CHF exacerbation. Surgery consultation prompted by RUQ US demonstrating a distended gallbladder and cholelithiasis.     PLAN:  - Patient is a poor surgical candidate; likely not a candidate for general anesthesia  - HIDA negative; no acute cholecystitis  - surgery to sign off    B-team Surgery  [id number]

## 2024-01-03 NOTE — PROGRESS NOTE ADULT - PROBLEM SELECTOR PLAN 1
- pro-BNP stable, still 2.8K  - TTE with EF 50-55%  - Home regimen is torsemide 100 daily per PCP Dr. Rubio - started on lasix 40 IV in , with Farxiga   - increased lasix to 40 IV bid today  - Strict I/os, daily weights, fluid restriction and low salt diet - additional diuresis as needed  - Monitor on tele, monitor K on BMP - pro-BNP stable, 2.8K  - TTE with EF 50-55%  - Home regimen is torsemide 100 daily per PCP Dr. Rubio - started on lasix 40 IV in , with Farxiga   - will hold lasix today as pt dry on exam  - Strict I/os, daily weights, low salt diet - additional diuresis as needed  - Monitor on tele, monitor K on BMP

## 2024-01-03 NOTE — DISCHARGE NOTE PROVIDER - NSDCCPTREATMENT_GEN_ALL_CORE_FT
PRINCIPAL PROCEDURE  Procedure: Chest xray, PA & lateral  Findings and Treatment:   < end of copied text >  EXAM: Portable chest  FINDINGS:  Persistent opacity of the left hemithorax with mild mediastinal shift to   the right consistent with large effusion/atelectasis.  Visualized left upper lobe and right lung remain clear.  Heart size is stable.  No pneumothorax.  COMPARISON: December 27, 2023  IMPRESSION: Large left effusion without vascular congestion to suggest   CHF.< from: Xray Chest 1 View- PORTABLE-Routine (Xray Chest 1 View- PORTABLE-Routine .) (01.02.24 @ 10:31) >        SECONDARY PROCEDURE  Procedure: HIDA scan  Findings and Treatment:   < end of copied text >  INTERPRETATION:  RADIOPHARMACEUTICAL: 3.1 mCi Tc-99m-Mebrofenin, I.V.  CLINICAL INFORMATION: 89 year old male with right upper quadrant   abdominal pain, cholelithiasis, and gallbladder wall thickening and edema   on ultrasound; referred to evaluate for acute cholecystitis.  TECHNIQUE:  Dynamic imaging of the anterior abdomen was performed for 1   hour following radiopharmaceutical injection. Static images of the   abdomen in the anterior, right anterior oblique and right lateral views   were obtained immediately thereafter.  COMPARISON: No previous hepatobiliary scans were available for comparison.  FINDINGS: There is prompt, homogeneous uptake of radiopharmaceutical by   the hepatocytes. Activity is first seen in the gallbladder at about 35   minutes and in the bowel at about 25 minutes. There is good clearance of   activity from the liver by the end of the study.  IMPRESSION: Normal hepatobiliary scan. No radionuclide evidence of acute   cholecystitis.  --- End of Report ---< from: [facility] Hepatobiliary Imaging (01.03.24 @ 11:25) >      Procedure: Ultrasound of upper abdomen  Findings and Treatment:   < end of copied text >  INTERPRETATION:  CLINICAL INFORMATION: Right upper quadrant pain.  COMPARISON: Renal ultrasound 9/21/2023.  TECHNIQUE: Sonography of the right upper quadrant.  FINDINGS:  Liver: Within normal limits.  Bile ducts: Normal caliber. Common bile duct measures 4 mm.  Gallbladder: Distended with gallstones. Gallbladder wall thickening and   edema measuring up to 6 mm. Technologist reports positive sonographic   Engel sign.  Pancreas: Poorly visualized.  Right kidney: 10.5 cm. No hydronephrosis. Multiple cysts measuring up to   7.8 x 6.9 x 7.8 cm. Increased echogenicity.  Ascites: Trace.  IVC: Visualized portions are within normal limits.  IMPRESSION:  Findings suspicious for acute cholecystitis.  Echogenic right kidney, suggestive of renal parenchymal disease.  --- End of Report ---< from: US Abdomen Upper Quadrant Right (01.01.24 @ 08:45) >

## 2024-01-03 NOTE — CONSULT NOTE ADULT - ASSESSMENT
89 YO man with PMH of afib on Eliquis, b/l Le blisters, CHF (EF 45-50%, combined systolic and diastolic), CVA, HTN, COPD, CKD3, recent hospitalization for CHF exacerbation, p/w SOB (due to pleural effusion) and b/L lower extremity shin wounds.    Recommendations:  - patient is a very poor surgical candidate, therefore no surgical intervention warranted  - no signs of limb ischemia  - c/w local wound care of shin wounds  - smoking cessation and exercise counseling  - optimize heart failure to alleviate LE edema, to improve patient's ability to ambulate  - compression socks  - DASH diet  - rest of care per primary team    C-Team, [id number] 89 YO man with PMH of afib on Eliquis, b/l Le blisters, CHF (EF 45-50%, combined systolic and diastolic), CVA, HTN, COPD, CKD3, recent hospitalization for CHF exacerbation, p/w SOB (due to pleural effusion) and b/L lower extremity shin wounds.    Recommendations:  - patient is a very poor surgical candidate, therefore no surgical intervention warranted  - no signs of limb ischemia  - c/w local wound care of shin wounds  - smoking cessation and exercise counseling  - optimize heart failure to alleviate LE edema, to improve patient's ability to ambulate  - compression socks  - DASH diet  - rest of care per primary team    C-Team, [id number]

## 2024-01-03 NOTE — DISCHARGE NOTE PROVIDER - NSDCCPCAREPLAN_GEN_ALL_CORE_FT
PRINCIPAL DISCHARGE DIAGNOSIS  Diagnosis: Pleural effusion, left  Assessment and Plan of Treatment: You came to the hospital for a large pleural effusion. This was drained in [facility]. When you came to the hospital, you required oxygen and were short of breath. You were given diuretic medications to remove the fluid which improved your shortness of breath and leg swelling. The interventional radiologists did not recommend placing a catheter because you were stable. Please follow up with a pulmonologist and your cardiologist regarding further investigation of the effusion.      SECONDARY DISCHARGE DIAGNOSES  Diagnosis: Acute combined systolic and diastolic congestive heart failure  Assessment and Plan of Treatment: You were treated with diuretic medication to off load fluid from your lungs and legs. You were restarted on your home diuretic torsemide. Please continue to take this medication daily. Follow closely with your cardiologist.    Diagnosis: Cholelithiasis  Assessment and Plan of Treatment: You were found to have stones in the gallbladder,. which seemed like infection of gallbladder from the ultrasound. You had a scan which showed the gallbladder was not infected.    Diagnosis: Peripheral arterial disease  Assessment and Plan of Treatment: You were found to have severe peripheral arterial disease of the legs. You were seen by vascular surgery who stated you were not a candidate for surgery. Please elevate the legs, stop smoking and exercise.    Diagnosis: Multiple wounds of skin  Assessment and Plan of Treatment: You were seen by wound care for treatment of your leg wounds and dermatitis of your buttocks. Please follow the Wound Care recommendations:  Topical recommendations for bilateral medial legs wounds: Clean with normal saline (clean in circular motions with NS and gauze). Pat dry. Apply liquid barrier film to periwound skin, allow to dry. Place non adherent silicone layer dressing (Adaptic touch) over bases of wound, cover with Aquacel AG hydrofiber sheet, cover with ABD pad and kerlix, secure with paper tape. Change daily or PRN if soiled.   Right medial lower leg intact fluid filled blister- Clean with NS. Pat dry. Apply liquid barrier film, cover with non adherent silicone layer (adaptic touch), cover with ABD Pad and Kerlix, secure with paper tape. Change daily or prn   Moisture associated dermatitis in gluteal cleft and inner buttocks: Clean with skin cleanser. Pat dry. Apply a thin layer of Tonja barrier moisture cream, apply twice a day or prn if soiled.     PRINCIPAL DISCHARGE DIAGNOSIS  Diagnosis: Pleural effusion, left  Assessment and Plan of Treatment: You came to the hospital for a large pleural effusion. This was drained in [facility]. When you came to the hospital, you required oxygen and were short of breath. You were given diuretic medications to remove the fluid which improved your shortness of breath and leg swelling. The interventional radiologists did not recommend placing a catheter because you were stable. Please follow up with a pulmonologist and your cardiologist regarding further investigation of the effusion.      SECONDARY DISCHARGE DIAGNOSES  Diagnosis: Acute combined systolic and diastolic congestive heart failure  Assessment and Plan of Treatment: You were treated with diuretic medication to off load fluid from your lungs and legs. You were restarted on your home diuretic torsemide. Please continue to take this medication daily. Follow closely with your cardiologist.    Diagnosis: Cholelithiasis  Assessment and Plan of Treatment: You were found to have stones in the gallbladder,. which seemed like infection of gallbladder from the ultrasound. You had a scan which showed the gallbladder was not infected.    Diagnosis: Peripheral arterial disease  Assessment and Plan of Treatment: You were found to have severe peripheral arterial disease of the legs. You were seen by vascular surgery who stated you were not a candidate for surgery. Please elevate the legs, stop smoking and exercise.    Diagnosis: Multiple wounds of skin  Assessment and Plan of Treatment: You were seen by wound care for treatment of your leg wounds and dermatitis of your buttocks. Please follow the Wound Care recommendations:  Topical recommendations for bilateral medial legs wounds: Clean with normal saline (clean in circular motions with NS and gauze). Pat dry. Apply liquid barrier film to periwound skin, allow to dry. Place non adherent silicone layer dressing (Adaptic touch) over bases of wound, cover with Aquacel AG hydrofiber sheet, cover with ABD pad and kerlix, secure with paper tape. Change daily or PRN if soiled.   Right medial lower leg intact fluid filled blister- Clean with NS. Pat dry. Apply liquid barrier film, cover with non adherent silicone layer (adaptic touch), cover with ABD Pad and Kerlix, secure with paper tape. Change daily or prn   Moisture associated dermatitis in gluteal cleft and inner buttocks: Clean with skin cleanser. Pat dry. Apply a thin layer of Tonja barrier moisture cream, apply twice a day or prn if soiled.

## 2024-01-03 NOTE — DISCHARGE NOTE PROVIDER - CARE PROVIDER_API CALL
Dewayne Rubio  Cardiovascular Disease  [address] [address]  Phone: [phone]  Fax: [phone]  Established Patient  Follow Up Time: 1 week   Dewayne Rubio  Cardiovascular Disease  [address] [address]  Phone: [phone]  Fax: [phone]  Established Patient  Follow Up Time: 1 week

## 2024-01-03 NOTE — PROGRESS NOTE ADULT - PROBLEM SELECTOR PLAN 6
- Cholecystitis noted, bile ducts normal  - GGT elevated  - CTM CMP - Cholecystitis noted, bile ducts normal  - GGT elevated  - CTM CMP  - no meng on hida

## 2024-01-03 NOTE — PROGRESS NOTE ADULT - PROBLEM SELECTOR PLAN 10
DVT: Hold chemical for now given hemothorax   Diet: Low sodium, fluid restriction  Dispo: PT recommended rehab DVT: heparin subq  Diet: Low sodium, fluid restriction  Dispo: PT recommended rehab DVT: heparin subq  Diet: Low sodium  Dispo: PT recommended rehab

## 2024-01-03 NOTE — PROGRESS NOTE ADULT - SUBJECTIVE AND OBJECTIVE BOX
Overnight events:   - No acute events    SUBJECTIVE:  Patient was seen and examined on AM rounds. Denies new complaints. Denies abdominal pain, fever/chills, SOB, nausea/vomiting.    OBJECTIVE:  Vital Signs Last 24 Hrs  T(C): 36.3 (03 Jan 2024 12:39), Max: 36.6 (03 Jan 2024 05:22)  T(F): 97.3 (03 Jan 2024 12:39), Max: 97.8 (03 Jan 2024 05:22)  HR: 72 (03 Jan 2024 12:39) (65 - 98)  BP: 116/59 (03 Jan 2024 12:39) (111/70 - 136/65)  BP(mean): --  RR: 18 (03 Jan 2024 12:39) (17 - 19)  SpO2: 98% (03 Jan 2024 12:39) (93% - 100%)    Parameters below as of 03 Jan 2024 12:39  Patient On (Oxygen Delivery Method): room air      01-02-24 @ 07:01 - 01-03-24 @ 07:00  --------------------------------------------------------  IN: 730 mL / OUT: 1340 mL / NET: -610 mL    01-03-24 @ 07:01 - 01-03-24 @ 15:27  --------------------------------------------------------  IN: 240 mL / OUT: 400 mL / NET: -160 mL      Physical Examination:  General: No acute distress  Respiratory: Nonlabored  Cardiovascular: normotensive, regular rate  Abdominal: Soft, moderately distended, nontender. No rebound or guarding. No organomegaly, no palpable mass.  Extremities: Warm      LABS:                        9.2    7.40  )-----------( 201      ( 03 Jan 2024 06:46 )             30.1       01-03    144  |  98  |  102<H>  ----------------------------<  97  4.1   |  30  |  1.72<H>    Ca    8.9      03 Jan 2024 06:46  Phos  4.8     01-03  Mg     2.90     01-03    TPro  7.3  /  Alb  3.3  /  TBili  0.5  /  DBili  x   /  AST  105<H>  /  ALT  125<H>  /  AlkPhos  183<H>  01-03

## 2024-01-03 NOTE — DISCHARGE NOTE PROVIDER - DETAILS OF MALNUTRITION DIAGNOSIS/DIAGNOSES
This patient has been assessed with a concern for Malnutrition and was treated during this hospitalization for the following Nutrition diagnosis/diagnoses:     -  01/02/2024: Severe protein-calorie malnutrition

## 2024-01-03 NOTE — DISCHARGE NOTE PROVIDER - NSFOLLOWUPCLINICS_GEN_ALL_ED_FT
[facility] Pulmonolgy and Sleep Medicine  Pulmonology  [address] [place] [address], [place] [address]  Phone: [phone]  Fax:      [facility] Pulmonolgy and Sleep Medicine  Pulmonology  [address] [place] [address], [place] [address]  Phone: [phone]  Fax:

## 2024-01-03 NOTE — PROGRESS NOTE ADULT - SUBJECTIVE AND OBJECTIVE BOX
Subjective & objective:  Pt was seen and examined by thoracic.   Pt is sitting on chair, not in acute distress, on room air.   Denies chest pain, SOB, nausea, vomiting, abdominal pain.    Vital Signs:  Vital Signs Last 24 Hrs  T(C): 36.3 (01-03-24 @ 12:39), Max: 36.6 (01-03-24 @ 05:22)  T(F): 97.3 (01-03-24 @ 12:39), Max: 97.8 (01-03-24 @ 05:22)  HR: 72 (01-03-24 @ 12:39) (65 - 98)  BP: 116/59 (01-03-24 @ 12:39) (111/70 - 136/65)  RR: 18 (01-03-24 @ 12:39) (17 - 19)  SpO2: 98% (01-03-24 @ 12:39) (93% - 100%) on (O2)    PE:  General: awake and alert NAD  Neurology: A&Ox3, nonfocal  Respiratory: CTA B/L  CV: RRR, S1S2, no murmurs, rubs or gallops  Abdominal: Soft, NT, ND     Relevant labs, radiology and Medications reviewed                        9.2    7.40  )-----------( 201      ( 03 Jan 2024 06:46 )             30.1     01-03    144  |  98  |  102<H>  ----------------------------<  97  4.1   |  30  |  1.72<H>    Ca    8.9      03 Jan 2024 06:46  Phos  4.8     01-03  Mg     2.90     01-03    TPro  7.3  /  Alb  3.3  /  TBili  0.5  /  DBili  x   /  AST  105<H>  /  ALT  125<H>  /  AlkPhos  183<H>  01-03    PT/INR - ( 03 Jan 2024 06:46 )   PT: 13.1 sec;   INR: 1.18 ratio         PTT - ( 03 Jan 2024 06:46 )  PTT:32.4 sec  MEDICATIONS  (STANDING):  allopurinol 100 milliGRAM(s) Oral daily  aspirin enteric coated 81 milliGRAM(s) Oral daily  atorvastatin 40 milliGRAM(s) Oral at bedtime  bacitracin   Ointment 1 Application(s) Topical daily  budesonide 160 MICROgram(s)/formoterol 4.5 MICROgram(s) Inhaler 2 Puff(s) Inhalation two times a day  dapagliflozin 10 milliGRAM(s) Oral daily  heparin   Injectable 5000 Unit(s) SubCutaneous every 12 hours  melatonin 3 milliGRAM(s) Oral at bedtime  metoprolol succinate ER 50 milliGRAM(s) Oral daily  pantoprazole    Tablet 40 milliGRAM(s) Oral before breakfast  polyethylene glycol 3350 17 Gram(s) Oral two times a day  senna 2 Tablet(s) Oral at bedtime  tiotropium 2.5 MICROgram(s) Inhaler 2 Puff(s) Inhalation daily    MEDICATIONS  (PRN):  acetaminophen     Tablet .. 650 milliGRAM(s) Oral every 6 hours PRN Temp greater or equal to 38C (100.4F), Mild Pain (1 - 3)  albuterol    90 MICROgram(s) HFA Inhaler 2 Puff(s) Inhalation every 6 hours PRN Shortness of Breath and/or Wheezing  aluminum hydroxide/magnesium hydroxide/simethicone Suspension 30 milliLiter(s) Oral every 4 hours PRN Dyspepsia  ondansetron Injectable 4 milliGRAM(s) IV Push every 8 hours PRN Nausea and/or Vomiting    Pertinent Physical Exam  I&O's Summary    02 Jan 2024 07:01  -  03 Jan 2024 07:00  --------------------------------------------------------  IN: 730 mL / OUT: 1340 mL / NET: -610 mL    03 Jan 2024 07:01  -  03 Jan 2024 14:12  --------------------------------------------------------  IN: 0 mL / OUT: 200 mL / NET: -200 mL    < from: CT Chest No Cont (12.20.23 @ 11:11) >    ACC: [id number] EXAM:  CT CHEST   ORDERED BY: MILTON VALDEZ     PROCEDURE DATE:  12/20/2023          INTERPRETATION:  CLINICAL INFORMATION: Left pleural effusion.    COMPARISON: 11/22/2023.    CONTRAST/COMPLICATIONS:  IV Contrast: NONE  Oral Contrast: NONE  Complications: None reported at time of study completion    PROCEDURE:  CT of the Chest was performed.  Sagittal and coronal reformats were performed.    FINDINGS:    LUNGS AND AIRWAYS: Patent central airways.  Band type opacity is   identified within the right lower lobe, likely related to atelectasis.   Opacity within the lingular segment of the left upper lobe and left lower   lobe is consistent with atelectasis; however, underlying parenchymal   infiltrate/consolidation cannot be excluded.  PLEURA: No right pleural effusion. Moderate left pleural effusion. No   pneumothorax.  MEDIASTINUM AND YANIQUE: Mediastinal lymph nodes identified measuring up to   2.6 x 2.0 cm. No definite hilar lymphadenopathy.  VESSELS: Coronary arterial calcifications. Heavy atherosclerotic   calcification noted. Thoracic aortic caliber appears unremarkable.  HEART: Cardiomegaly. No pericardial effusion.  CHEST WALL AND LOWER NECK: Bilateral retroareolar soft tissue   attenuation, likely gynecomastia.  VISUALIZED UPPER ABDOMEN: Gallstones. Visualized adrenal glands appear   unremarkable. Bilateral renal cysts.  BONES: Chronic appearing deformity bilateral thoracic cage. Spinal   degenerative changes.    IMPRESSION: Moderate left pleural effusion. Band type opacity is   identified within the right lower lobe, likely related to atelectasis.   Opacity within the lingular segment of the left upper lobe and left lower   lobe is consistent with atelectasis; however, underlying parenchymal   infiltrate/consolidation cannot be excluded. Mediastinal lymph nodes   identified measuring up to 2.6 x 2.0 cm.      --- End of Report ---    MIKEY GAMING MD; Attending Radiologist  This document has been electronically signed. Dec 20 2023  2:09PM    < end of copied text >    Assessment  88 y/o male with PMH of afib on Eliquis, b/l Le blisters, CHF (EF 45-50%, combined systolic and diastolic), CVA, HTN, COPD, CKD3, recent hospitalization for CHF exacerbation 11/22-11/27 where he left AMA, presenting with concern for LE blisters, found to have acute on chronic CHF, large exudative pleural effusion s/p 1L removal, transferred from  for thoracic surgery evaluation. Thoracic surgery consulted for recurrent Left pleural effusion in setting of CHF exacerbation.      PLAN  -Continue care per primary team  -Recommend IR pigtail catheter placement   -Pt is not a surgical candidate at this time  -Supplemental O2 as needed and monitoring   -Pt images and chart reviewed by Dr Bhat   -Plan above d/w Dr. Bhat  -Call Thoracic [phone] if any questions     Subjective & objective:  Pt was seen and examined by thoracic.   Pt is sitting on chair, not in acute distress, on room air.   Denies chest pain, SOB, nausea, vomiting, abdominal pain.    Vital Signs:  Vital Signs Last 24 Hrs  T(C): 36.3 (01-03-24 @ 12:39), Max: 36.6 (01-03-24 @ 05:22)  T(F): 97.3 (01-03-24 @ 12:39), Max: 97.8 (01-03-24 @ 05:22)  HR: 72 (01-03-24 @ 12:39) (65 - 98)  BP: 116/59 (01-03-24 @ 12:39) (111/70 - 136/65)  RR: 18 (01-03-24 @ 12:39) (17 - 19)  SpO2: 98% (01-03-24 @ 12:39) (93% - 100%) on (O2)    PE:  General: awake and alert NAD  Neurology: A&Ox3, nonfocal  Respiratory: CTA B/L  CV: RRR, S1S2, no murmurs, rubs or gallops  Abdominal: Soft, NT, ND     Relevant labs, radiology and Medications reviewed                        9.2    7.40  )-----------( 201      ( 03 Jan 2024 06:46 )             30.1     01-03    144  |  98  |  102<H>  ----------------------------<  97  4.1   |  30  |  1.72<H>    Ca    8.9      03 Jan 2024 06:46  Phos  4.8     01-03  Mg     2.90     01-03    TPro  7.3  /  Alb  3.3  /  TBili  0.5  /  DBili  x   /  AST  105<H>  /  ALT  125<H>  /  AlkPhos  183<H>  01-03    PT/INR - ( 03 Jan 2024 06:46 )   PT: 13.1 sec;   INR: 1.18 ratio         PTT - ( 03 Jan 2024 06:46 )  PTT:32.4 sec  MEDICATIONS  (STANDING):  allopurinol 100 milliGRAM(s) Oral daily  aspirin enteric coated 81 milliGRAM(s) Oral daily  atorvastatin 40 milliGRAM(s) Oral at bedtime  bacitracin   Ointment 1 Application(s) Topical daily  budesonide 160 MICROgram(s)/formoterol 4.5 MICROgram(s) Inhaler 2 Puff(s) Inhalation two times a day  dapagliflozin 10 milliGRAM(s) Oral daily  heparin   Injectable 5000 Unit(s) SubCutaneous every 12 hours  melatonin 3 milliGRAM(s) Oral at bedtime  metoprolol succinate ER 50 milliGRAM(s) Oral daily  pantoprazole    Tablet 40 milliGRAM(s) Oral before breakfast  polyethylene glycol 3350 17 Gram(s) Oral two times a day  senna 2 Tablet(s) Oral at bedtime  tiotropium 2.5 MICROgram(s) Inhaler 2 Puff(s) Inhalation daily    MEDICATIONS  (PRN):  acetaminophen     Tablet .. 650 milliGRAM(s) Oral every 6 hours PRN Temp greater or equal to 38C (100.4F), Mild Pain (1 - 3)  albuterol    90 MICROgram(s) HFA Inhaler 2 Puff(s) Inhalation every 6 hours PRN Shortness of Breath and/or Wheezing  aluminum hydroxide/magnesium hydroxide/simethicone Suspension 30 milliLiter(s) Oral every 4 hours PRN Dyspepsia  ondansetron Injectable 4 milliGRAM(s) IV Push every 8 hours PRN Nausea and/or Vomiting    Pertinent Physical Exam  I&O's Summary    02 Jan 2024 07:01  -  03 Jan 2024 07:00  --------------------------------------------------------  IN: 730 mL / OUT: 1340 mL / NET: -610 mL    03 Jan 2024 07:01  -  03 Jan 2024 14:12  --------------------------------------------------------  IN: 0 mL / OUT: 200 mL / NET: -200 mL    < from: CT Chest No Cont (12.20.23 @ 11:11) >    ACC: [id number] EXAM:  CT CHEST   ORDERED BY: MILTON VALDEZ     PROCEDURE DATE:  12/20/2023          INTERPRETATION:  CLINICAL INFORMATION: Left pleural effusion.    COMPARISON: 11/22/2023.    CONTRAST/COMPLICATIONS:  IV Contrast: NONE  Oral Contrast: NONE  Complications: None reported at time of study completion    PROCEDURE:  CT of the Chest was performed.  Sagittal and coronal reformats were performed.    FINDINGS:    LUNGS AND AIRWAYS: Patent central airways.  Band type opacity is   identified within the right lower lobe, likely related to atelectasis.   Opacity within the lingular segment of the left upper lobe and left lower   lobe is consistent with atelectasis; however, underlying parenchymal   infiltrate/consolidation cannot be excluded.  PLEURA: No right pleural effusion. Moderate left pleural effusion. No   pneumothorax.  MEDIASTINUM AND YANIQUE: Mediastinal lymph nodes identified measuring up to   2.6 x 2.0 cm. No definite hilar lymphadenopathy.  VESSELS: Coronary arterial calcifications. Heavy atherosclerotic   calcification noted. Thoracic aortic caliber appears unremarkable.  HEART: Cardiomegaly. No pericardial effusion.  CHEST WALL AND LOWER NECK: Bilateral retroareolar soft tissue   attenuation, likely gynecomastia.  VISUALIZED UPPER ABDOMEN: Gallstones. Visualized adrenal glands appear   unremarkable. Bilateral renal cysts.  BONES: Chronic appearing deformity bilateral thoracic cage. Spinal   degenerative changes.    IMPRESSION: Moderate left pleural effusion. Band type opacity is   identified within the right lower lobe, likely related to atelectasis.   Opacity within the lingular segment of the left upper lobe and left lower   lobe is consistent with atelectasis; however, underlying parenchymal   infiltrate/consolidation cannot be excluded. Mediastinal lymph nodes   identified measuring up to 2.6 x 2.0 cm.      --- End of Report ---    MIKEY GAMING MD; Attending Radiologist  This document has been electronically signed. Dec 20 2023  2:09PM    < end of copied text >    Assessment  88 y/o male with PMH of afib on Eliquis, b/l Le blisters, CHF (EF 45-50%, combined systolic and diastolic), CVA, HTN, COPD, CKD3, recent hospitalization for CHF exacerbation 11/22-11/27 where he left AMA, presenting with concern for LE blisters, found to have acute on chronic CHF, large exudative pleural effusion s/p 1L removal, transferred from  for thoracic surgery evaluation. Thoracic surgery consulted for recurrent Left pleural effusion in setting of CHF exacerbation.      PLAN  -Continue care per primary team  -Recommend IR pigtail catheter placement   -Pt is not a surgical candidate at this time  -Supplemental O2 as needed and monitoring   -Pt images and chart reviewed by Dr Bhat   -Plan above d/w Dr. Bhat  -Call Thoracic [phone] if any questions

## 2024-01-03 NOTE — DISCHARGE NOTE PROVIDER - PROVIDER TOKENS
PROVIDER:[TOKEN:[[id number]],FOLLOWUP:[1 week],ESTABLISHEDPATIENT:[T]] PROVIDER:[TOKEN:[[id number]],FOLLOWUP:[1 week],ESTABLISHEDPATIENT:[T]]

## 2024-01-03 NOTE — DISCHARGE NOTE PROVIDER - NSDCMRMEDTOKEN_GEN_ALL_CORE_FT
acetaminophen 325 mg oral tablet: 2 tab(s) orally every 6 hours As needed Temp greater or equal to 38C (100.4F), Mild Pain (1 - 3)  albuterol 90 mcg/inh inhalation aerosol: 2 puff(s) inhaled every 6 hours As needed Shortness of Breath and/or Wheezing  allopurinol 100 mg oral tablet: 1 tab(s) orally once a day  apixaban 2.5 mg oral tablet: 1 tab(s) orally every 12 hours  aspirin 81 mg oral delayed release tablet: 1 tab(s) orally once a day  atorvastatin 40 mg oral tablet: 1 tab(s) orally once a day (at bedtime)  bacitracin 500 units/g topical ointment: 1 Apply topically to affected area once a day  budesonide-formoterol 160 mcg-4.5 mcg/inh inhalation aerosol: 2 puff(s) inhaled 2 times a day  diphenhydramine-zinc acetate 2%-0.1% topical cream: 1 Apply topically to affected area 2 times a day As needed Itching  Farxiga 10 mg oral tablet: 1 tab(s) orally once a day  furosemide 100 mg/100 mL-0.9% intravenous solution: 40 milliliter(s) intravenous once  melatonin 3 mg oral tablet: 1 tab(s) orally once a day (at bedtime)  metoprolol succinate 50 mg oral tablet, extended release: 1 tab(s) orally once a day  pantoprazole 40 mg oral delayed release tablet: 1 tab(s) orally once a day (before a meal)  polyethylene glycol 3350 oral powder for reconstitution: 17 gram(s) orally 2 times a day  senna leaf extract oral tablet: 2 tab(s) orally once a day (at bedtime)  tiotropium 2.5 mcg/inh inhalation aerosol: 2 puff(s) inhaled once a day   acetaminophen 325 mg oral tablet: 2 tab(s) orally every 6 hours As needed Temp greater or equal to 38C (100.4F), Mild Pain (1 - 3)  albuterol 90 mcg/inh inhalation aerosol: 2 puff(s) inhaled every 6 hours As needed Shortness of Breath and/or Wheezing  allopurinol 100 mg oral tablet: 1 tab(s) orally once a day  apixaban 2.5 mg oral tablet: 1 tab(s) orally every 12 hours  aspirin 81 mg oral delayed release tablet: 1 tab(s) orally once a day  atorvastatin 40 mg oral tablet: 1 tab(s) orally once a day (at bedtime)  budesonide-formoterol 160 mcg-4.5 mcg/inh inhalation aerosol: 2 puff(s) inhaled 2 times a day  Farxiga 10 mg oral tablet: 1 tab(s) orally once a day  melatonin 3 mg oral tablet: 1 tab(s) orally once a day (at bedtime)  metoprolol succinate 50 mg oral tablet, extended release: 1 tab(s) orally once a day  pantoprazole 40 mg oral delayed release tablet: 1 tab(s) orally once a day (before a meal)  petrolatum topical ointment: 1 Apply topically to affected area every 8 hours  polyethylene glycol 3350 oral powder for reconstitution: 17 gram(s) orally 2 times a day  senna leaf extract oral tablet: 2 tab(s) orally once a day (at bedtime)  tiotropium 2.5 mcg/inh inhalation aerosol: 2 puff(s) inhaled once a day  torsemide 100 mg oral tablet: 1 tab(s) orally once a day  triamcinolone 0.1% topical ointment: 1 Apply topically to affected area every 12 hours   acetaminophen 325 mg oral tablet: 2 tab(s) orally every 6 hours As needed Temp greater or equal to 38C (100.4F), Mild Pain (1 - 3)  albuterol 90 mcg/inh inhalation aerosol: 2 puff(s) inhaled every 6 hours As needed Shortness of Breath and/or Wheezing  allopurinol 100 mg oral tablet: 1 tab(s) orally once a day  apixaban 2.5 mg oral tablet: 1 tab(s) orally every 12 hours  aspirin 81 mg oral delayed release tablet: 1 tab(s) orally once a day  atorvastatin 40 mg oral tablet: 1 tab(s) orally once a day (at bedtime)  budesonide-formoterol 160 mcg-4.5 mcg/inh inhalation aerosol: 2 puff(s) inhaled 2 times a day  Farxiga 10 mg oral tablet: 1 tab(s) orally once a day  melatonin 3 mg oral tablet: 1 tab(s) orally once a day (at bedtime)  metoprolol succinate 50 mg oral tablet, extended release: 1 tab(s) orally once a day  pantoprazole 40 mg oral delayed release tablet: 1 tab(s) orally once a day (before a meal)  petrolatum topical ointment: Apply topically to affected area 3 times a day Apply topically to buttocks area and below back of leg three times a day MDD: 3 apps  petrolatum topical ointment: 1 Apply topically to affected area every 8 hours  polyethylene glycol 3350 oral powder for reconstitution: 17 gram(s) orally 2 times a day  senna leaf extract oral tablet: 2 tab(s) orally once a day (at bedtime)  tiotropium 2.5 mcg/inh inhalation aerosol: 2 puff(s) inhaled once a day  torsemide 100 mg oral tablet: 1 tab(s) orally once a day  triamcinolone 0.1% topical ointment: Apply topically to affected area 3 times a day Apply topically to bumps on legs two times a day MDD: 3 apps  triamcinolone 0.1% topical ointment: 1 Apply topically to affected area every 12 hours

## 2024-01-03 NOTE — PROGRESS NOTE ADULT - SUBJECTIVE AND OBJECTIVE BOX
RAD SINGLETON  89y  Male      Patient is a 89y old  Male who presents with a chief complaint of LE blisters (02 Jan 2024 04:41)      INTERVAL HPI/OVERNIGHT EVENTS: No acute events overnight.    Patient seen and examined at bedside. He is feeling well, eating breakfast. Complained of the blisters on his legs, feels pain there. Denied chest pain, SOB or worsening swelling.      REVIEW OF SYSTEMS:  CONSTITUTIONAL: No fever, weight loss, or fatigue  RESPIRATORY: No cough, wheezing, chills or hemoptysis; No shortness of breath  CARDIOVASCULAR: No chest pain, palpitations, dizziness, or leg swelling  GASTROINTESTINAL: No abdominal or epigastric pain. No nausea, vomiting, or hematemesis; No diarrhea or constipation. No melena or hematochezia.  GENITOURINARY: No dysuria, frequency, hematuria, or incontinence  NEUROLOGICAL: No headaches, memory loss, loss of strength, numbness, or tremors  SKIN: No itching, burning, rashes, or lesions   LYMPH NODES: No enlarged glands  ENDOCRINE: No heat or cold intolerance; No hair loss  MUSCULOSKELETAL: No joint pain or swelling; No muscle, back, or extremity pain  PSYCHIATRIC: No depression, anxiety, mood swings, or difficulty sleeping    FAMILY HISTORY:    T(C): 36.7 (01-02-24 @ 05:23), Max: 36.7 (01-02-24 @ 05:23)  HR: 113 (01-02-24 @ 05:23) (64 - 113)  BP: 121/77 (01-02-24 @ 05:23) (121/51 - 136/76)  RR: 18 (01-02-24 @ 05:23) (16 - 18)  SpO2: 100% (01-02-24 @ 05:23) (92% - 100%)  Wt(kg): --Vital Signs Last 24 Hrs  T(C): 36.7 (02 Jan 2024 05:23), Max: 36.7 (02 Jan 2024 05:23)  T(F): 98 (02 Jan 2024 05:23), Max: 98 (02 Jan 2024 05:23)  HR: 113 (02 Jan 2024 05:23) (64 - 113)  BP: 121/77 (02 Jan 2024 05:23) (121/51 - 136/76)  BP(mean): --  RR: 18 (02 Jan 2024 05:23) (16 - 18)  SpO2: 100% (02 Jan 2024 05:23) (92% - 100%)    Parameters below as of 02 Jan 2024 05:23  Patient On (Oxygen Delivery Method): nasal cannula  O2 Flow (L/min): 2      PHYSICAL EXAM:  GENERAL: NAD, well-groomed, well-developed  HEAD:  Atraumatic, Normocephalic  EYES: EOMI, PERRLA, conjunctiva and sclera clear  ENMT: No tonsillar erythema, exudates, or enlargement; Moist mucous membranes, Good dentition, No lesions  NECK: Supple, No JVD, Normal thyroid  NERVOUS SYSTEM:  Alert & Oriented X3, Good concentration; Motor Strength 5/5 B/L upper and lower extremities; DTRs 2+ intact and symmetric  CHEST/LUNG: Clear to percussion bilaterally; No rales, rhonchi, wheezing, or rubs  HEART: Regular rate and rhythm; No murmurs, rubs, or gallops  ABDOMEN: Soft, Nontender, Nondistended; Bowel sounds present  EXTREMITIES:  2+ Peripheral Pulses, No clubbing, cyanosis, or edema  LYMPH: No lymphadenopathy noted  SKIN: No rashes or lesions    Consultant(s) Notes Reviewed:  [x ] YES  [ ] NO  Care Discussed with Consultants/Other Providers [ x] YES  [ ] NO    LABS:    CBC Full  -  ( 02 Jan 2024 08:51 )  WBC Count : 8.03 K/uL  RBC Count : 2.78 M/uL  Hemoglobin : 8.8 g/dL  Hematocrit : 29.5 %  Platelet Count - Automated : 218 K/uL  Mean Cell Volume : 106.1 fL  Mean Cell Hemoglobin : 31.7 pg  Mean Cell Hemoglobin Concentration : 29.8 gm/dL  Auto Neutrophil # : 5.31 K/uL  Auto Lymphocyte # : 1.24 K/uL  Auto Monocyte # : 1.01 K/uL  Auto Eosinophil # : 0.40 K/uL  Auto Basophil # : 0.03 K/uL  Auto Neutrophil % : 66.1 %  Auto Lymphocyte % : 15.4 %  Auto Monocyte % : 12.6 %  Auto Eosinophil % : 5.0 %  Auto Basophil % : 0.4 %    01-02    143  |  100  |  86<H>  ----------------------------<  83  4.3   |  32<H>  |  1.56<H>    Ca    8.8      02 Jan 2024 08:51  Phos  4.5     01-02  Mg     3.00     01-02    TPro  7.7  /  Alb  3.5  /  TBili  0.8  /  DBili  x   /  AST  197<H>  /  ALT  165<H>  /  AlkPhos  216<H>  01-02        RADIOLOGY & ADDITIONAL TESTS:    Imaging Personally Reviewed:  [ ] YES  [ ] NO  acetaminophen     Tablet .. 650 milliGRAM(s) Oral every 6 hours PRN  albuterol    90 MICROgram(s) HFA Inhaler 2 Puff(s) Inhalation every 6 hours PRN  allopurinol 100 milliGRAM(s) Oral daily  aluminum hydroxide/magnesium hydroxide/simethicone Suspension 30 milliLiter(s) Oral every 4 hours PRN  aspirin enteric coated 81 milliGRAM(s) Oral daily  atorvastatin 40 milliGRAM(s) Oral at bedtime  bacitracin   Ointment 1 Application(s) Topical daily  budesonide 160 MICROgram(s)/formoterol 4.5 MICROgram(s) Inhaler 2 Puff(s) Inhalation two times a day  cefTRIAXone   IVPB 1000 milliGRAM(s) IV Intermittent every 24 hours  dapagliflozin 10 milliGRAM(s) Oral daily  furosemide   Injectable 40 milliGRAM(s) IV Push daily  melatonin 3 milliGRAM(s) Oral at bedtime  metoprolol succinate ER 50 milliGRAM(s) Oral daily  metroNIDAZOLE    Tablet 500 milliGRAM(s) Oral every 8 hours  ondansetron Injectable 4 milliGRAM(s) IV Push every 8 hours PRN  pantoprazole    Tablet 40 milliGRAM(s) Oral before breakfast  polyethylene glycol 3350 17 Gram(s) Oral two times a day  senna 2 Tablet(s) Oral at bedtime  tiotropium 2.5 MICROgram(s) Inhaler 2 Puff(s) Inhalation daily      HEALTH ISSUES - PROBLEM Dx:  Pleural effusion, left    Acute cholecystitis    Acute on chronic combined systolic and diastolic congestive heart failure    Afib    Macrocytic anemia    IRINEO (acute kidney injury)    Elevated alkaline phosphatase level    Transaminitis    Need for prophylactic measure    Multiple wounds of skin             RAD SINGLETON  89y  Male      Patient is a 89y old  Male who presents with a chief complaint of LE blisters (02 Jan 2024 04:41)      INTERVAL HPI/OVERNIGHT EVENTS: No acute events overnight.    Patient seen and examined at bedside. Stated he is feeling thirsty but well. His leg swelling has improved. Denied chest pain, SOB or worsening swelling.      REVIEW OF SYSTEMS:  CONSTITUTIONAL: No fever, weight loss, or fatigue  RESPIRATORY: No cough, wheezing, chills or hemoptysis; No shortness of breath  CARDIOVASCULAR: No chest pain, palpitations, dizziness, or leg swelling  GASTROINTESTINAL: No abdominal or epigastric pain. No nausea, vomiting, or hematemesis; No diarrhea or constipation. No melena or hematochezia.  GENITOURINARY: No dysuria, frequency, hematuria, or incontinence  NEUROLOGICAL: No headaches, memory loss, loss of strength, numbness, or tremors  SKIN: No itching, burning, rashes, or lesions   LYMPH NODES: No enlarged glands  ENDOCRINE: No heat or cold intolerance; No hair loss  MUSCULOSKELETAL: No joint pain or swelling; No muscle, back, or extremity pain  PSYCHIATRIC: No depression, anxiety, mood swings, or difficulty sleeping    FAMILY HISTORY:    Vital Signs Last 24 Hrs  T(C): 36.3 (03 Jan 2024 12:39), Max: 36.6 (03 Jan 2024 05:22)  T(F): 97.3 (03 Jan 2024 12:39), Max: 97.8 (03 Jan 2024 05:22)  HR: 72 (03 Jan 2024 12:39) (65 - 98)  BP: 116/59 (03 Jan 2024 12:39) (111/70 - 136/65)  BP(mean): --  RR: 18 (03 Jan 2024 12:39) (17 - 19)  SpO2: 98% (03 Jan 2024 12:39) (93% - 100%)    Parameters below as of 03 Jan 2024 12:39  Patient On (Oxygen Delivery Method): room air    PHYSICAL EXAM:  GENERAL: NAD, well-groomed, well-developed  HEAD:  Atraumatic, Normocephalic  EYES: EOMI, PERRLA, conjunctiva and sclera clear  ENMT: No tonsillar erythema, exudates, or enlargement; Moist mucous membranes, Good dentition, No lesions  NECK: Supple, No JVD, Normal thyroid  NERVOUS SYSTEM:  Alert & Oriented X3, Good concentration; Motor Strength 5/5 B/L upper and lower extremities; DTRs 2+ intact and symmetric  CHEST/LUNG: Clear to percussion bilaterally; No rales, rhonchi, wheezing, or rubs  HEART: Regular rate and rhythm; No murmurs, rubs, or gallops  ABDOMEN: Soft, Nontender, Nondistended; Bowel sounds present  EXTREMITIES:  2+ Peripheral Pulses, No clubbing, cyanosis, or edema  LYMPH: No lymphadenopathy noted  SKIN: No rashes or lesions    Consultant(s) Notes Reviewed:  [x ] YES  [ ] NO  Care Discussed with Consultants/Other Providers [ x] YES  [ ] NO    LABS:    CBC Full  -  ( 03 Jan 2024 06:46 )  WBC Count : 7.40 K/uL  RBC Count : 2.90 M/uL  Hemoglobin : 9.2 g/dL  Hematocrit : 30.1 %  Platelet Count - Automated : 201 K/uL  Mean Cell Volume : 103.8 fL  Mean Cell Hemoglobin : 31.7 pg  Mean Cell Hemoglobin Concentration : 30.6 gm/dL  Auto Neutrophil # : x  Auto Lymphocyte # : x  Auto Monocyte # : x  Auto Eosinophil # : x  Auto Basophil # : x  Auto Neutrophil % : x  Auto Lymphocyte % : x  Auto Monocyte % : x  Auto Eosinophil % : x  Auto Basophil % : x    01-03    144  |  98  |  102<H>  ----------------------------<  97  4.1   |  30  |  1.72<H>    Ca    8.9      03 Jan 2024 06:46  Phos  4.8     01-03  Mg     2.90     01-03    TPro  7.3  /  Alb  3.3  /  TBili  0.5  /  DBili  x   /  AST  105<H>  /  ALT  125<H>  /  AlkPhos  183<H>  01-03      RADIOLOGY & ADDITIONAL TESTS:    Imaging Personally Reviewed:  [ ] YES  [ ] NO  acetaminophen     Tablet .. 650 milliGRAM(s) Oral every 6 hours PRN  albuterol    90 MICROgram(s) HFA Inhaler 2 Puff(s) Inhalation every 6 hours PRN  allopurinol 100 milliGRAM(s) Oral daily  aluminum hydroxide/magnesium hydroxide/simethicone Suspension 30 milliLiter(s) Oral every 4 hours PRN  aspirin enteric coated 81 milliGRAM(s) Oral daily  atorvastatin 40 milliGRAM(s) Oral at bedtime  bacitracin   Ointment 1 Application(s) Topical daily  budesonide 160 MICROgram(s)/formoterol 4.5 MICROgram(s) Inhaler 2 Puff(s) Inhalation two times a day  cefTRIAXone   IVPB 1000 milliGRAM(s) IV Intermittent every 24 hours  dapagliflozin 10 milliGRAM(s) Oral daily  furosemide   Injectable 40 milliGRAM(s) IV Push daily  melatonin 3 milliGRAM(s) Oral at bedtime  metoprolol succinate ER 50 milliGRAM(s) Oral daily  metroNIDAZOLE    Tablet 500 milliGRAM(s) Oral every 8 hours  ondansetron Injectable 4 milliGRAM(s) IV Push every 8 hours PRN  pantoprazole    Tablet 40 milliGRAM(s) Oral before breakfast  polyethylene glycol 3350 17 Gram(s) Oral two times a day  senna 2 Tablet(s) Oral at bedtime  tiotropium 2.5 MICROgram(s) Inhaler 2 Puff(s) Inhalation daily      HEALTH ISSUES - PROBLEM Dx:  Pleural effusion, left    Acute cholecystitis    Acute on chronic combined systolic and diastolic congestive heart failure    Afib    Macrocytic anemia    IRINEO (acute kidney injury)    Elevated alkaline phosphatase level    Transaminitis    Need for prophylactic measure    Multiple wounds of skin

## 2024-01-03 NOTE — CONSULT NOTE ADULT - ASSESSMENT
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 Low suspicion for lichen planus or bullous pemphigoid.  - recommend topical triamcinolone 0.1% ointment BID to AA. Do not use on face/groin. Use for a maximum of 2 weeks.  - c/w compression w ACE wraps and elevation of legs as tolerated  - recommend checking bullous pemphigoid /230 antibodies    The patient's chart was reviewed in addition to being seen and examined at bedside with the dermatology attending Dr. Burns.  Recommendations were communicated with the primary team.    Please page [phone] with a [phone] call-back number for further related questions.  Please re-consult Dermatology for any new or worsening developments.    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 Low suspicion for lichen planus or bullous pemphigoid.  - recommend topical triamcinolone 0.1% ointment BID to AA. Do not use on face/groin. Use for a maximum of 2 weeks.  - c/w compression w ACE wraps and elevation of legs as tolerated  - recommend checking bullous pemphigoid /230 antibodies    The patient's chart was reviewed in addition to being seen and examined at bedside with the dermatology attending Dr. Burns.  Recommendations were communicated with the primary team.    Please page [phone] with a [phone] call-back number for further related questions.  Please re-consult Dermatology for any new or worsening developments.    Traci Oliva MD  Resident Physician, PGY-3  [facility] Dermatology  Pager: [phone]  Office: [phone]

## 2024-01-03 NOTE — CONSULT NOTE ADULT - SUBJECTIVE AND OBJECTIVE BOX
Vascular Surgery Consult    Consulting attending: Shira      HPI:  Mr. Antony is an 89 YO man with PMH of afib on Eliquis, b/l Le blisters, CHF (EF 45-50%, combined systolic and diastolic), CVA, HTN, COPD, CKD3, recent hospitalization for CHF exacerbation 11/22-11/27 where he left AMA, presenting with concern for LE blisters. He notes constipation and loss of appetite.    In [place], patient was found to have CT with large left sided loculated effusion, cardiomegaly with BNP 2.8K. Echo showed LVEF 50-55%, moderate mitral stenosis. He was hypoxic on RA to 89%. Cardiology was consulted and recommended diuresis. Pulmonology was consulted for thoracentesis, which was done and 1L removed. His symptoms improved and pulmonology recommended transfer to [facility] for placement of pleurX with thoracic surgery.     On the day of transfer was noted to have cholecystitis on RUQ ultrasound. Surgery was consulted and felt that patient is a poor surgical candidate, may benefit from a cholecystostomy drain.     On arrival, patient still with shortness of breath, unable to lie flat on his back. He experiences chest pain if he moves when standing. He is constipated, last BM yesterday. He has had a "normal" amount of urine output.  (02 Jan 2024 02:31)    Vascular Surgery consulted for c/f PVD. Patient seen and examined bedside. Hemodynamically stable. Patient denies claudication symptoms, denies rest pain in his lower extremities. 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 Patient reports the lower extremity edema and SOB worsened over the past year. ON examination, patient is constantly SOB, cannot lay flat, bilateral lower extremity edema and medial shin wounds. Vitals are stable. DELFINO/PVR: Left DELFINO is 0.72 and right DELFINO is 0.46. Vascular consulted to alfredo      PAST MEDICAL HISTORY:  Afib    Congestive heart failure (CHF)    CVA (cerebral vascular accident)    Hypertension, unspecified type    Chronic obstructive pulmonary disease (COPD)        PAST SURGICAL HISTORY:  No significant past surgical history        MEDICATIONS:  acetaminophen     Tablet .. 650 milliGRAM(s) Oral every 6 hours PRN  albuterol    90 MICROgram(s) HFA Inhaler 2 Puff(s) Inhalation every 6 hours PRN  allopurinol 100 milliGRAM(s) Oral daily  aluminum hydroxide/magnesium hydroxide/simethicone Suspension 30 milliLiter(s) Oral every 4 hours PRN  aspirin enteric coated 81 milliGRAM(s) Oral daily  atorvastatin 40 milliGRAM(s) Oral at bedtime  budesonide 160 MICROgram(s)/formoterol 4.5 MICROgram(s) Inhaler 2 Puff(s) Inhalation two times a day  dapagliflozin 10 milliGRAM(s) Oral daily  heparin   Injectable 5000 Unit(s) SubCutaneous every 12 hours  melatonin 3 milliGRAM(s) Oral at bedtime  metoprolol succinate ER 50 milliGRAM(s) Oral daily  ondansetron Injectable 4 milliGRAM(s) IV Push every 8 hours PRN  pantoprazole    Tablet 40 milliGRAM(s) Oral before breakfast  polyethylene glycol 3350 17 Gram(s) Oral two times a day  senna 2 Tablet(s) Oral at bedtime  tiotropium 2.5 MICROgram(s) Inhaler 2 Puff(s) Inhalation daily        ALLERGIES:  No Known Allergies        VITALS & I/Os:  Vital Signs Last 24 Hrs  T(C): 36.4 (03 Jan 2024 16:31), Max: 36.6 (03 Jan 2024 05:22)  T(F): 97.5 (03 Jan 2024 16:31), Max: 97.8 (03 Jan 2024 05:22)  HR: 65 (03 Jan 2024 16:31) (65 - 98)  BP: 119/56 (03 Jan 2024 16:31) (115/61 - 136/65)  BP(mean): --  RR: 18 (03 Jan 2024 16:31) (18 - 19)  SpO2: 100% (03 Jan 2024 16:31) (93% - 100%)    Parameters below as of 03 Jan 2024 16:31  Patient On (Oxygen Delivery Method): nasal cannula  O2 Flow (L/min): 2      I&O's Summary    02 Jan 2024 07:01  -  03 Jan 2024 07:00  --------------------------------------------------------  IN: 730 mL / OUT: 1340 mL / NET: -610 mL    03 Jan 2024 07:01  -  03 Jan 2024 18:44  --------------------------------------------------------  IN: 240 mL / OUT: 400 mL / NET: -160 mL          PHYSICAL EXAM:  General: NAD, AOx3  Respiratory: very labored respirations (using accessory muscles), only able to speak a few words before having to catch his breath  Cardiovascular: RRR  Abdominal: Soft, nondistended, nontender. No rebound or guarding. No organomegaly, no palpable mass.  Extremities: Warm  Vascular:  - RUE: no signs of edema/cyanosis/bruising, palpable radial/ulnar  - LUE: no signs of edema/cyanosis/bruising, palpable radial/ulnar  - RLE: nontender, medial lower third shin wound, edematous, nonpalpable DP/PT, dopplerable DP/PT, palpable popliteal pulse, no signs of ischemia, no toe wounds/dusky appearance, average capillary refill  - LLE: nontender, medial lower third shin wound, edematous, nonpalpable DP/PT, dopplerable DP/PT, palpable popliteal pulse, no signs of ischemia, no toe wounds/dusky appearance, average capillary refill        LABS:                        9.2    7.40  )-----------( 201      ( 03 Jan 2024 06:46 )             30.1     01-03    144  |  98  |  102<H>  ----------------------------<  97  4.1   |  30  |  1.72<H>    Ca    8.9      03 Jan 2024 06:46  Phos  4.8     01-03  Mg     2.90     01-03    TPro  7.3  /  Alb  3.3  /  TBili  0.5  /  DBili  x   /  AST  105<H>  /  ALT  125<H>  /  AlkPhos  183<H>  01-03    Lactate:    PT/INR - ( 03 Jan 2024 06:46 )   PT: 13.1 sec;   INR: 1.18 ratio    PTT - ( 03 Jan 2024 06:46 )  PTT:32.4 sec        Urinalysis Basic - ( 03 Jan 2024 06:46 )    Color: x / Appearance: x / SG: x / pH: x  Gluc: 97 mg/dL / Ketone: x  / Bili: x / Urobili: x   Blood: x / Protein: x / Nitrite: x   Leuk Esterase: x / RBC: x / WBC x   Sq Epi: x / Non Sq Epi: x / Bacteria: x          IMAGING:  < from: US Duplex Venous Lower Ext Complete, Bilateral (11.25.23 @ 10:41) >  FINDINGS:    RIGHT:  Normal compressibility of the RIGHT common femoral, femoral and popliteal   veins.  Doppler examination shows normal spontaneous and phasic flow.  No RIGHT calf vein thrombosis is detected.    LEFT:  Normal compressibility of the LEFT common femoral, femoral and popliteal   veins.  Doppler examination shows normal spontaneous and phasic flow.  No LEFT calf vein thrombosis is detected.    IMPRESSION:  No evidence of deep venous thrombosis in either lower extremity.      < end of copied text >                                                                                               Vascular Surgery Consult    Consulting attending: Shira      HPI:  Mr. Antony is an 89 YO man with PMH of afib on Eliquis, b/l Le blisters, CHF (EF 45-50%, combined systolic and diastolic), CVA, HTN, COPD, CKD3, recent hospitalization for CHF exacerbation 11/22-11/27 where he left AMA, presenting with concern for LE blisters. He notes constipation and loss of appetite.    In [place], patient was found to have CT with large left sided loculated effusion, cardiomegaly with BNP 2.8K. Echo showed LVEF 50-55%, moderate mitral stenosis. He was hypoxic on RA to 89%. Cardiology was consulted and recommended diuresis. Pulmonology was consulted for thoracentesis, which was done and 1L removed. His symptoms improved and pulmonology recommended transfer to [facility] for placement of pleurX with thoracic surgery.     On the day of transfer was noted to have cholecystitis on RUQ ultrasound. Surgery was consulted and felt that patient is a poor surgical candidate, may benefit from a cholecystostomy drain.     On arrival, patient still with shortness of breath, unable to lie flat on his back. He experiences chest pain if he moves when standing. He is constipated, last BM yesterday. He has had a "normal" amount of urine output.  (02 Jan 2024 02:31)    Vascular Surgery consulted for c/f PVD. Patient seen and examined bedside. Hemodynamically stable. Patient denies claudication symptoms, denies rest pain in his lower extremities. 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 Patient reports the lower extremity edema and SOB worsened over the past year. ON examination, patient is constantly SOB, cannot lay flat, bilateral lower extremity edema and medial shin wounds. Vitals are stable. DELFINO/PVR: Left DELFINO is 0.72 and right DELFINO is 0.46. Vascular consulted to alfredo      PAST MEDICAL HISTORY:  Afib    Congestive heart failure (CHF)    CVA (cerebral vascular accident)    Hypertension, unspecified type    Chronic obstructive pulmonary disease (COPD)        PAST SURGICAL HISTORY:  No significant past surgical history        MEDICATIONS:  acetaminophen     Tablet .. 650 milliGRAM(s) Oral every 6 hours PRN  albuterol    90 MICROgram(s) HFA Inhaler 2 Puff(s) Inhalation every 6 hours PRN  allopurinol 100 milliGRAM(s) Oral daily  aluminum hydroxide/magnesium hydroxide/simethicone Suspension 30 milliLiter(s) Oral every 4 hours PRN  aspirin enteric coated 81 milliGRAM(s) Oral daily  atorvastatin 40 milliGRAM(s) Oral at bedtime  budesonide 160 MICROgram(s)/formoterol 4.5 MICROgram(s) Inhaler 2 Puff(s) Inhalation two times a day  dapagliflozin 10 milliGRAM(s) Oral daily  heparin   Injectable 5000 Unit(s) SubCutaneous every 12 hours  melatonin 3 milliGRAM(s) Oral at bedtime  metoprolol succinate ER 50 milliGRAM(s) Oral daily  ondansetron Injectable 4 milliGRAM(s) IV Push every 8 hours PRN  pantoprazole    Tablet 40 milliGRAM(s) Oral before breakfast  polyethylene glycol 3350 17 Gram(s) Oral two times a day  senna 2 Tablet(s) Oral at bedtime  tiotropium 2.5 MICROgram(s) Inhaler 2 Puff(s) Inhalation daily        ALLERGIES:  No Known Allergies        VITALS & I/Os:  Vital Signs Last 24 Hrs  T(C): 36.4 (03 Jan 2024 16:31), Max: 36.6 (03 Jan 2024 05:22)  T(F): 97.5 (03 Jan 2024 16:31), Max: 97.8 (03 Jan 2024 05:22)  HR: 65 (03 Jan 2024 16:31) (65 - 98)  BP: 119/56 (03 Jan 2024 16:31) (115/61 - 136/65)  BP(mean): --  RR: 18 (03 Jan 2024 16:31) (18 - 19)  SpO2: 100% (03 Jan 2024 16:31) (93% - 100%)    Parameters below as of 03 Jan 2024 16:31  Patient On (Oxygen Delivery Method): nasal cannula  O2 Flow (L/min): 2      I&O's Summary    02 Jan 2024 07:01  -  03 Jan 2024 07:00  --------------------------------------------------------  IN: 730 mL / OUT: 1340 mL / NET: -610 mL    03 Jan 2024 07:01  -  03 Jan 2024 18:44  --------------------------------------------------------  IN: 240 mL / OUT: 400 mL / NET: -160 mL          PHYSICAL EXAM:  General: NAD, AOx3  Respiratory: very labored respirations (using accessory muscles), only able to speak a few words before having to catch his breath  Cardiovascular: RRR  Abdominal: Soft, nondistended, nontender. No rebound or guarding. No organomegaly, no palpable mass.  Extremities: Warm  Vascular:  - RUE: no signs of edema/cyanosis/bruising, palpable radial/ulnar  - LUE: no signs of edema/cyanosis/bruising, palpable radial/ulnar  - RLE: nontender, medial lower third shin wound, edematous, nonpalpable DP/PT, dopplerable DP/PT, palpable popliteal pulse, no signs of ischemia, no toe wounds/dusky appearance, average capillary refill  - LLE: nontender, medial lower third shin wound, edematous, nonpalpable DP/PT, dopplerable DP/PT, palpable popliteal pulse, no signs of ischemia, no toe wounds/dusky appearance, average capillary refill        LABS:                        9.2    7.40  )-----------( 201      ( 03 Jan 2024 06:46 )             30.1     01-03    144  |  98  |  102<H>  ----------------------------<  97  4.1   |  30  |  1.72<H>    Ca    8.9      03 Jan 2024 06:46  Phos  4.8     01-03  Mg     2.90     01-03    TPro  7.3  /  Alb  3.3  /  TBili  0.5  /  DBili  x   /  AST  105<H>  /  ALT  125<H>  /  AlkPhos  183<H>  01-03    Lactate:    PT/INR - ( 03 Jan 2024 06:46 )   PT: 13.1 sec;   INR: 1.18 ratio    PTT - ( 03 Jan 2024 06:46 )  PTT:32.4 sec        Urinalysis Basic - ( 03 Jan 2024 06:46 )    Color: x / Appearance: x / SG: x / pH: x  Gluc: 97 mg/dL / Ketone: x  / Bili: x / Urobili: x   Blood: x / Protein: x / Nitrite: x   Leuk Esterase: x / RBC: x / WBC x   Sq Epi: x / Non Sq Epi: x / Bacteria: x          IMAGING:  < from: US Duplex Venous Lower Ext Complete, Bilateral (11.25.23 @ 10:41) >  FINDINGS:    RIGHT:  Normal compressibility of the RIGHT common femoral, femoral and popliteal   veins.  Doppler examination shows normal spontaneous and phasic flow.  No RIGHT calf vein thrombosis is detected.    LEFT:  Normal compressibility of the LEFT common femoral, femoral and popliteal   veins.  Doppler examination shows normal spontaneous and phasic flow.  No LEFT calf vein thrombosis is detected.    IMPRESSION:  No evidence of deep venous thrombosis in either lower extremity.      < end of copied text >

## 2024-01-03 NOTE — PROGRESS NOTE ADULT - PROBLEM SELECTOR PLAN 2
- S/P thoracentesis with 1 L removal (described as "blood") 12/19 - exudative, large amount of RBCs noted  - Cytopath negative for malignancy  - Transferred to [facility] for thoracic evaluation for pleurX catheter and pleuroscopy; seen by CT surgery   - pending thoracic surg recs  - Hold AC iso possible procedures - S/P thoracentesis with 1 L removal (described as "blood") 12/19 - exudative, large amount of RBCs noted  - Cytopath negative for malignancy  - Transferred to [facility] for thoracic evaluation for pleurX catheter and pleuroscopy; seen by CT surgery   - pending thoracic surg recs  - Hold AC iso possible procedures - S/P thoracentesis with 1 L removal (described as "blood") 12/19 - exudative, large amount of RBCs noted  - Cytopath negative for malignancy  - Transferred to [facility] for thoracic evaluation for pleurX catheter and pleuroscopy; seen by CT surgery   - thoracic surg recommended pigtail  - IR consult placed  - Hold AC iso possible procedures - S/P thoracentesis with 1 L removal (described as "blood") 12/19 - exudative, large amount of RBCs noted  - Cytopath negative for malignancy  - Transferred to [facility] for thoracic evaluation for pleurX catheter and pleuroscopy; seen by CT surgery   - thoracic surg recommended pigtail  - IR consult placed  - Hold AC iso possible procedures

## 2024-01-03 NOTE — PROGRESS NOTE ADULT - ATTENDING COMMENTS
89M w/ HTN, COPD, CKD (b/l Cr 1.6), AF (apixaban), h/o CVA, combined systolic and diastolic heart failure (EF 45-50%) with chronic LE blisters, recent hospitalization for acute decompensated heart failure (discharged before medically advised) re-admitted to [facility] for worsening LE blisters found to have acute decompensated heart failure with large left loculated pleural effusion s/p 1L thoracentesis with exudative effusion transferred to [facility] for thoracic surgery evaluation - recommending IR consult for pigtail placement.    Etiology of effusion remains unclear, exudative effusion less consistent with CHF.  Appears dry on exam and labs today - hold diuresis  HIDA negative for cholecystitis    - Appreciate thoracic surgery recommendations  - IR consulted for consideration of pigtail  - Hold lasix, continue home dapagliflozin  - Wound care consulted for leg wounds  - R DELFINO/PVR severely decreased, vascular surgery consult today  - Holding apixaban in case of procedures, continue DVT ppx in the meantime  - PT recommending rehab after discharge 89M w/ HTN, COPD, CKD (b/l Cr 1.6), AF (apixaban), h/o CVA, combined systolic and diastolic heart failure (EF 45-50%) with chronic LE blisters, recent hospitalization for acute decompensated heart failure (discharged before medically advised) re-admitted to [facility] for worsening LE blisters found to have acute decompensated heart failure with large left loculated pleural effusion s/p 1L thoracentesis with exudative effusion transferred to [facility] for thoracic surgery evaluation - recommending IR consult for pigtail placement.    Etiology of effusion remains unclear, exudative effusion less consistent with CHF.  Appears dry on exam and labs today - hold diuresis  HIDA negative for cholecystitis    - Appreciate thoracic surgery recommendations  - IR consulted for consideration of pigtail  - Hold lasix, continue home dapagliflozin  - Wound care consulted for leg wounds  - R DELFINO/PVR severely decreased, vascular surgery consult today  - Holding apixaban in case of procedures, continue DVT ppx in the meantime  - PT recommending rehab after discharge

## 2024-01-03 NOTE — PROGRESS NOTE ADULT - PROBLEM SELECTOR PLAN 3
- Surgery- not a surgical candidate at this time  - s/p ceftriaxone, flagyl  - will monitor off abx  - Blood cultures x2, urine culture  - Lipase normal  - Avoid opioids  - HIDA in AM to r/o cholecystitis - Surgery- not a surgical candidate at this time  - s/p ceftriaxone, flagyl  - will monitor off abx  - Blood cultures x2, urine culture no growth  - Lipase normal  - Avoid opioids  - HIDA showed no cholecystitis

## 2024-01-03 NOTE — PROGRESS NOTE ADULT - TIME BILLING
Time-based billing (NON-critical care).     More than 50% of the visit was spent counseling and / or coordinating care by the attending physician.  The necessity of the time spent during the encounter on this date of service was due to:     documentation in [place], reviewing chart and coordinating care with patient/resident and interdisciplinary staff (such as , social workers, etc) as well as reviewing vitals, laboratory data, radiology, medication list, consultants' recommendations and prior records. Interventions were performed as documented above. Time-based billing (NON-critical care).     More than 50% of the visit was spent counseling and / or coordinating care by the attending physician.  The necessity of the time spent during the encounter on this date of service was due to:     documentation in [place], reviewing chart and coordinating care with patient/resident and interdisciplinary staff (such as , social workers, etc) as well as reviewing vitals, laboratory data, radiology, medication list, consultants' recommendations and prior records. Interventions were performed as documented above.

## 2024-01-04 NOTE — PROGRESS NOTE ADULT - ATTENDING COMMENTS
89M w/ HTN, COPD, CKD (b/l Cr 1.6), AF (apixaban), h/o CVA, combined systolic and diastolic heart failure (EF 45-50%) with chronic LE blisters, recent hospitalization for acute decompensated heart failure (discharged before medically advised) re-admitted to [facility] for worsening LE blisters found to have acute decompensated heart failure with large left loculated pleural effusion s/p 1L thoracentesis with exudative effusion transferred to [facility] for thoracic surgery evaluation - after thoracic surgery and IR consult, plan for no further intervention.    - Appreciate thoracic surgery recommendations  - IR consulted for consideration of pigtail - recommend no further intervention given clinical improvement  - Resume home torsemide, continue home dapagliflozin - monitor volume status, electrolytes, creatinine daily  - Wound care consulted for leg wounds - dermatology consulted for rash  - R DELFINO/PVR severely decreased, vascular surgery consulted, recommend conservative management  - Resume apixaban  - PT recommending rehab after discharge, patient prefers home    Discussed discharge planning with Lilia. 89M w/ HTN, COPD, CKD (b/l Cr 1.6), AF (apixaban), h/o CVA, combined systolic and diastolic heart failure (EF 45-50%) with chronic LE blisters, recent hospitalization for acute decompensated heart failure (discharged before medically advised) re-admitted to [facility] for worsening LE blisters found to have acute decompensated heart failure with large left loculated pleural effusion s/p 1L thoracentesis with exudative effusion transferred to [facility] for thoracic surgery evaluation - after thoracic surgery and IR consult, plan for no further intervention.    - Appreciate thoracic surgery recommendations  - IR consulted for consideration of pigtail - recommend no further intervention given clinical improvement  - Resume home torsemide, continue home dapagliflozin - monitor volume status, electrolytes, creatinine daily  - Wound care consulted for leg wounds - dermatology consulted for rash  - R DELFINO/PVR severely decreased, vascular surgery consulted, recommend conservative management  - Resume apixaban  - PT recommending rehab after discharge, patient prefers home    Discussed discharge planning with Lilia.

## 2024-01-04 NOTE — PROVIDER CONTACT NOTE (CHANGE IN STATUS NOTIFICATION) - SITUATION
patient c/o of headache, vitals stable as per chart; NO other symptoms noted; afib controlled on monitor; infrequent pvc
patient c/o of b/l buttock itchiness; upon assessment RN noticed multiple scratches on B/L buttocks and blanchable redness b/l buttocks, scratches bleeding small amount of blood

## 2024-01-04 NOTE — PROGRESS NOTE ADULT - ASSESSMENT
Mr. Antony is an 89 YO man with PMH of afib on Eliquis, b/l Le blisters, CHF (EF 45-50%, combined systolic and diastolic), CVA, HTN, COPD, CKD3, recent hospitalization for CHF exacerbation 11/22-11/27 where he left AMA, presenting with concern for LE blisters, found to have acute on chronic CHF, large exudative pleural effusion s/p 1L removal, transferred from  for thoracic surgery evaluation. Mr. Antony is an 88 YO man with PMH of afib on Eliquis, b/l Le blisters, CHF (EF 45-50%, combined systolic and diastolic), CVA, HTN, COPD, CKD3, recent hospitalization for CHF exacerbation 11/22-11/27 where he left AMA, presenting with concern for LE blisters, found to have acute on chronic CHF, large exudative pleural effusion s/p 1L removal, transferred from  for thoracic surgery evaluation.

## 2024-01-04 NOTE — CONSULT NOTE ADULT - CONSULT REASON
R/o Acute Cholecystitis
HF with edema, overlying blisters
Abnormal DELFINO/PVR
rash on legs
pigtail catheter
For possible L PleurX placement

## 2024-01-04 NOTE — PROGRESS NOTE ADULT - CONVERSATION DETAILS
Discussed current condition with patient. He stated that his ability to make his own decisions is very important to him. We discussed who he would want to make his decisions if he were unable to make decisions on his own and I asked if he remembered previously appointing Antonietta as his healthcare proxy. He was focused on the idea that he wants to make his own decisions and declined to discuss further.    We discussed where he would like to be after this hospitalization. He is very reluctant to go back to a rehab facility because he likes his independence. He would like to go home. We discussed need for more help at home, and he agreed to discuss further with CODY and Antonietta.

## 2024-01-04 NOTE — PROGRESS NOTE ADULT - PROBLEM SELECTOR PLAN 3
- Surgery- not a surgical candidate at this time  - s/p ceftriaxone, flagyl  - will monitor off abx  - Blood cultures x2, urine culture no growth  - Lipase normal  - Avoid opioids  - HIDA showed no cholecystitis

## 2024-01-04 NOTE — CONSULT NOTE ADULT - SUBJECTIVE AND OBJECTIVE BOX
HPI:  Mr. Antony is an 88 YO man with PMH of afib on Eliquis, b/l Le blisters, CHF (EF 45-50%, combined systolic and diastolic), CVA, HTN, COPD, CKD3, recent hospitalization for CHF exacerbation 11/22-11/27 where he left AMA, presenting with concern for LE blisters, found to have acute on chronic CHF, large exudative pleural effusion s/p 1L removal, transferred from  for thoracic surgery evaluation.      Allergies: No Known Allergies    Medications (Abx/Cardiac/Anticoagulation/Blood Products)    aspirin enteric coated: 81 milliGRAM(s) Oral (01-03 @ 13:15)  furosemide   Injectable: 40 milliGRAM(s) IV Push (01-03 @ 05:43)  furosemide   Injectable: 40 milliGRAM(s) IV Push (01-02 @ 09:44)  heparin   Injectable: 5000 Unit(s) SubCutaneous (01-04 @ 05:49)  metoprolol succinate ER: 50 milliGRAM(s) Oral (01-04 @ 05:49)    Data:    T(C): 36.4  HR: 75  BP: 118/60  RR: 18  SpO2: 98%    -WBC 8.18 / HgB 9.4 / Hct 30.0 / Plt 207  -Na 143 / Cl 99 / BUN 89 / Glucose 109  -K 3.8 / CO2 30 / Cr 1.59  -ALT 94 / Alk Phos 169 / T.Bili 0.5  -INR 1.15 / PTT 34.8      Radiology:   Imaging reviewed     Assessment/Plan:   Mr. Antony is an 88 YO man with PMH of afib on Eliquis, b/l Le blisters, CHF (EF 45-50%, combined systolic and diastolic), CVA, HTN, COPD, CKD3, recent hospitalization for CHF exacerbation 11/22-11/27 where he left AMA, presenting with concern for LE blisters, found to have acute on chronic CHF, large exudative pleural effusion s/p 1L removal, transferred from  for thoracic surgery evaluation.    -per todays primary team note: pt denies chest pain, SOB or worsening swelling  -pt is afebrile, normotensive, 98% on RA.   -There is no clinical indication for thoracentesis or chest tube placement  -discussed w/ team     - Non-emergent consults: Place IR consult order in [place]  - Emergent issues (pager): [facility] [phone]; [facility] [phone]; [phone]  - Scheduling questions: [facility] [phone]; [facility] [phone]  - Clinic/outpatient booking: [facility] [phone]; [facility] [phone] HPI:  Mr. Antony is an 89 YO man with PMH of afib on Eliquis, b/l Le blisters, CHF (EF 45-50%, combined systolic and diastolic), CVA, HTN, COPD, CKD3, recent hospitalization for CHF exacerbation 11/22-11/27 where he left AMA, presenting with concern for LE blisters, found to have acute on chronic CHF, large exudative pleural effusion s/p 1L removal, transferred from  for thoracic surgery evaluation.      Allergies: No Known Allergies    Medications (Abx/Cardiac/Anticoagulation/Blood Products)    aspirin enteric coated: 81 milliGRAM(s) Oral (01-03 @ 13:15)  furosemide   Injectable: 40 milliGRAM(s) IV Push (01-03 @ 05:43)  furosemide   Injectable: 40 milliGRAM(s) IV Push (01-02 @ 09:44)  heparin   Injectable: 5000 Unit(s) SubCutaneous (01-04 @ 05:49)  metoprolol succinate ER: 50 milliGRAM(s) Oral (01-04 @ 05:49)    Data:    T(C): 36.4  HR: 75  BP: 118/60  RR: 18  SpO2: 98%    -WBC 8.18 / HgB 9.4 / Hct 30.0 / Plt 207  -Na 143 / Cl 99 / BUN 89 / Glucose 109  -K 3.8 / CO2 30 / Cr 1.59  -ALT 94 / Alk Phos 169 / T.Bili 0.5  -INR 1.15 / PTT 34.8      Radiology:   Imaging reviewed     Assessment/Plan:   Mr. Antony is an 89 YO man with PMH of afib on Eliquis, b/l Le blisters, CHF (EF 45-50%, combined systolic and diastolic), CVA, HTN, COPD, CKD3, recent hospitalization for CHF exacerbation 11/22-11/27 where he left AMA, presenting with concern for LE blisters, found to have acute on chronic CHF, large exudative pleural effusion s/p 1L removal, transferred from  for thoracic surgery evaluation.    -per todays primary team note: pt denies chest pain, SOB or worsening swelling  -pt is afebrile, normotensive, 98% on RA.   -There is no clinical indication for thoracentesis or chest tube placement  -discussed w/ team     - Non-emergent consults: Place IR consult order in [place]  - Emergent issues (pager): [facility] [phone]; [facility] [phone]; [phone]  - Scheduling questions: [facility] [phone]; [facility] [phone]  - Clinic/outpatient booking: [facility] [phone]; [facility] [phone]

## 2024-01-04 NOTE — PROGRESS NOTE ADULT - PROBLEM SELECTOR PLAN 4
-C/w rate control with metoprolol  -On eliquis at home, will hold ISO possible procedures -C/w rate control with metoprolol  -On eliquis at home, will resume

## 2024-01-04 NOTE — PROGRESS NOTE ADULT - PROBLEM SELECTOR PLAN 2
- S/P thoracentesis with 1 L removal (described as "blood") 12/19 - exudative, large amount of RBCs noted  - Cytopath negative for malignancy  - Transferred to [facility] for thoracic evaluation for pleurX catheter and pleuroscopy; seen by CT surgery   - thoracic surg recommended pigtail  - IR consult placed  - Hold AC iso possible procedures - S/P thoracentesis with 1 L removal (described as "blood") 12/19 - exudative, large amount of RBCs noted  - Cytopath negative for malignancy  - Transferred to [facility] for thoracic evaluation for pleurX catheter and pleuroscopy; seen by CT surgery   - thoracic surg recommended pigtail  - IR consult placed  - Hold AC iso possible procedures - S/P thoracentesis with 1 L removal (described as "blood") 12/19 - exudative, large amount of RBCs noted  - Cytopath negative for malignancy  - Transferred to [facility] for thoracic evaluation for pleurX catheter and pleuroscopy; seen by CT surgery   - thoracic surg recommended pigtail  - IR consult placed- no indication for pigtail as pt has clinically improved on diuretics  - Hold AC iso possible procedures - S/P thoracentesis with 1 L removal (described as "blood") 12/19 - exudative, large amount of RBCs noted  - Cytopath negative for malignancy  - Transferred to [facility] for thoracic evaluation for pleurX catheter and pleuroscopy; seen by CT surgery   - thoracic surg recommended pigtail  - IR consult placed- no indication for pigtail as pt has clinically improved on diuretics  - Hold AC iso possible procedures

## 2024-01-04 NOTE — PROGRESS NOTE ADULT - PROBLEM SELECTOR PLAN 1
- pro-BNP stable, 2.8K  - TTE with EF 50-55%  - Home regimen is torsemide 100 daily per PCP Dr. Rubio - started on lasix 40 IV in , with Farxiga   - will hold lasix today as pt dry on exam  - Strict I/os, daily weights, low salt diet - additional diuresis as needed  - Monitor on tele, monitor K on BMP - pro-BNP stable, 2.8K  - TTE with EF 50-55%  - Home regimen is torsemide 100 daily per PCP Dr. Rubio - started on lasix 40 IV in GC, with Farxiga   - restarted home torsemide today as renal function back to baseline  - Strict I/os, daily weights, low salt diet - additional diuresis as needed  - Monitor on tele, monitor K on BMP

## 2024-01-04 NOTE — PROGRESS NOTE ADULT - SUBJECTIVE AND OBJECTIVE BOX
RAD SINGLETON  89y  Male      Patient is a 89y old  Male who presents with a chief complaint of LE blisters (02 Jan 2024 04:41)      INTERVAL HPI/OVERNIGHT EVENTS: No acute events overnight.    Patient seen and examined at bedside. Stated he is feeling thirsty but well. His leg swelling has improved. Denied chest pain, SOB or worsening swelling.      REVIEW OF SYSTEMS:  CONSTITUTIONAL: No fever, weight loss, or fatigue  RESPIRATORY: No cough, wheezing, chills or hemoptysis; No shortness of breath  CARDIOVASCULAR: No chest pain, palpitations, dizziness, or leg swelling  GASTROINTESTINAL: No abdominal or epigastric pain. No nausea, vomiting, or hematemesis; No diarrhea or constipation. No melena or hematochezia.  GENITOURINARY: No dysuria, frequency, hematuria, or incontinence  NEUROLOGICAL: No headaches, memory loss, loss of strength, numbness, or tremors  SKIN: No itching, burning, rashes, or lesions   LYMPH NODES: No enlarged glands  ENDOCRINE: No heat or cold intolerance; No hair loss  MUSCULOSKELETAL: No joint pain or swelling; No muscle, back, or extremity pain  PSYCHIATRIC: No depression, anxiety, mood swings, or difficulty sleeping    FAMILY HISTORY:    Vital Signs Last 24 Hrs  T(C): 36.3 (03 Jan 2024 12:39), Max: 36.6 (03 Jan 2024 05:22)  T(F): 97.3 (03 Jan 2024 12:39), Max: 97.8 (03 Jan 2024 05:22)  HR: 72 (03 Jan 2024 12:39) (65 - 98)  BP: 116/59 (03 Jan 2024 12:39) (111/70 - 136/65)  BP(mean): --  RR: 18 (03 Jan 2024 12:39) (17 - 19)  SpO2: 98% (03 Jan 2024 12:39) (93% - 100%)    Parameters below as of 03 Jan 2024 12:39  Patient On (Oxygen Delivery Method): room air    PHYSICAL EXAM:  GENERAL: NAD, well-groomed, well-developed  HEAD:  Atraumatic, Normocephalic  EYES: EOMI, PERRLA, conjunctiva and sclera clear  ENMT: No tonsillar erythema, exudates, or enlargement; Moist mucous membranes, Good dentition, No lesions  NECK: Supple, No JVD, Normal thyroid  NERVOUS SYSTEM:  Alert & Oriented X3, Good concentration; Motor Strength 5/5 B/L upper and lower extremities; DTRs 2+ intact and symmetric  CHEST/LUNG: Clear to percussion bilaterally; No rales, rhonchi, wheezing, or rubs  HEART: Regular rate and rhythm; No murmurs, rubs, or gallops  ABDOMEN: Soft, Nontender, Nondistended; Bowel sounds present  EXTREMITIES:  2+ Peripheral Pulses, No clubbing, cyanosis, or edema  LYMPH: No lymphadenopathy noted  SKIN: No rashes or lesions    Consultant(s) Notes Reviewed:  [x ] YES  [ ] NO  Care Discussed with Consultants/Other Providers [ x] YES  [ ] NO    LABS:    CBC Full  -  ( 03 Jan 2024 06:46 )  WBC Count : 7.40 K/uL  RBC Count : 2.90 M/uL  Hemoglobin : 9.2 g/dL  Hematocrit : 30.1 %  Platelet Count - Automated : 201 K/uL  Mean Cell Volume : 103.8 fL  Mean Cell Hemoglobin : 31.7 pg  Mean Cell Hemoglobin Concentration : 30.6 gm/dL  Auto Neutrophil # : x  Auto Lymphocyte # : x  Auto Monocyte # : x  Auto Eosinophil # : x  Auto Basophil # : x  Auto Neutrophil % : x  Auto Lymphocyte % : x  Auto Monocyte % : x  Auto Eosinophil % : x  Auto Basophil % : x    01-03    144  |  98  |  102<H>  ----------------------------<  97  4.1   |  30  |  1.72<H>    Ca    8.9      03 Jan 2024 06:46  Phos  4.8     01-03  Mg     2.90     01-03    TPro  7.3  /  Alb  3.3  /  TBili  0.5  /  DBili  x   /  AST  105<H>  /  ALT  125<H>  /  AlkPhos  183<H>  01-03      RADIOLOGY & ADDITIONAL TESTS:    Imaging Personally Reviewed:  [ ] YES  [ ] NO  acetaminophen     Tablet .. 650 milliGRAM(s) Oral every 6 hours PRN  albuterol    90 MICROgram(s) HFA Inhaler 2 Puff(s) Inhalation every 6 hours PRN  allopurinol 100 milliGRAM(s) Oral daily  aluminum hydroxide/magnesium hydroxide/simethicone Suspension 30 milliLiter(s) Oral every 4 hours PRN  aspirin enteric coated 81 milliGRAM(s) Oral daily  atorvastatin 40 milliGRAM(s) Oral at bedtime  bacitracin   Ointment 1 Application(s) Topical daily  budesonide 160 MICROgram(s)/formoterol 4.5 MICROgram(s) Inhaler 2 Puff(s) Inhalation two times a day  cefTRIAXone   IVPB 1000 milliGRAM(s) IV Intermittent every 24 hours  dapagliflozin 10 milliGRAM(s) Oral daily  furosemide   Injectable 40 milliGRAM(s) IV Push daily  melatonin 3 milliGRAM(s) Oral at bedtime  metoprolol succinate ER 50 milliGRAM(s) Oral daily  metroNIDAZOLE    Tablet 500 milliGRAM(s) Oral every 8 hours  ondansetron Injectable 4 milliGRAM(s) IV Push every 8 hours PRN  pantoprazole    Tablet 40 milliGRAM(s) Oral before breakfast  polyethylene glycol 3350 17 Gram(s) Oral two times a day  senna 2 Tablet(s) Oral at bedtime  tiotropium 2.5 MICROgram(s) Inhaler 2 Puff(s) Inhalation daily      HEALTH ISSUES - PROBLEM Dx:  Pleural effusion, left    Acute cholecystitis    Acute on chronic combined systolic and diastolic congestive heart failure    Afib    Macrocytic anemia    IRINEO (acute kidney injury)    Elevated alkaline phosphatase level    Transaminitis    Need for prophylactic measure    Multiple wounds of skin             RAD SINGLETON  89y  Male      Patient is a 89y old  Male who presents with a chief complaint of LE blisters (02 Jan 2024 04:41)      INTERVAL HPI/OVERNIGHT EVENTS: No acute events overnight.    Patient seen and examined at bedside. Stated he is feeling well, was walking with walker. His leg swelling has improved. Denied chest pain, SOB or worsening swelling.      REVIEW OF SYSTEMS:  CONSTITUTIONAL: No fever, weight loss, or fatigue  RESPIRATORY: No cough, wheezing, chills or hemoptysis; No shortness of breath  CARDIOVASCULAR: No chest pain, palpitations, dizziness, or leg swelling  GASTROINTESTINAL: No abdominal or epigastric pain. No nausea, vomiting, or hematemesis; No diarrhea or constipation. No melena or hematochezia.  GENITOURINARY: No dysuria, frequency, hematuria, or incontinence  NEUROLOGICAL: No headaches, memory loss, loss of strength, numbness, or tremors  SKIN: No itching, burning, rashes, or lesions   LYMPH NODES: No enlarged glands  ENDOCRINE: No heat or cold intolerance; No hair loss  MUSCULOSKELETAL: No joint pain or swelling; No muscle, back, or extremity pain  PSYCHIATRIC: No depression, anxiety, mood swings, or difficulty sleeping    FAMILY HISTORY:    Vital Signs Last 24 Hrs  T(C): 36.4 (04 Jan 2024 05:40), Max: 36.4 (03 Jan 2024 16:31)  T(F): 97.5 (04 Jan 2024 05:40), Max: 97.6 (03 Jan 2024 20:09)  HR: 75 (04 Jan 2024 05:40) (64 - 84)  BP: 118/60 (04 Jan 2024 05:40) (116/59 - 125/56)  BP(mean): --  RR: 18 (04 Jan 2024 05:40) (18 - 18)  SpO2: 98% (04 Jan 2024 05:40) (95% - 100%)    Parameters below as of 04 Jan 2024 05:40  Patient On (Oxygen Delivery Method): room air        PHYSICAL EXAM:  GENERAL: NAD, well-groomed, well-developed  HEAD:  Atraumatic, Normocephalic  EYES: EOMI, PERRLA, conjunctiva and sclera clear  ENMT: No tonsillar erythema, exudates, or enlargement; Moist mucous membranes, Good dentition, No lesions  NECK: Supple, No JVD, Normal thyroid  NERVOUS SYSTEM:  Alert & Oriented X3, Good concentration; Motor Strength 5/5 B/L upper and lower extremities; DTRs 2+ intact and symmetric  CHEST/LUNG: Clear to percussion bilaterally; No rales, rhonchi, wheezing, or rubs  HEART: Regular rate and rhythm; No murmurs, rubs, or gallops  ABDOMEN: Soft, Nontender, Nondistended; Bowel sounds present  EXTREMITIES:  2+ Peripheral Pulses, No clubbing, cyanosis, or edema  LYMPH: No lymphadenopathy noted  SKIN: No rashes or lesions    Consultant(s) Notes Reviewed:  [x ] YES  [ ] NO  Care Discussed with Consultants/Other Providers [ x] YES  [ ] NO    LABS:    CBC Full  -  ( 04 Jan 2024 07:46 )  WBC Count : 8.18 K/uL  RBC Count : 2.97 M/uL  Hemoglobin : 9.4 g/dL  Hematocrit : 30.0 %  Platelet Count - Automated : 207 K/uL  Mean Cell Volume : 101.0 fL  Mean Cell Hemoglobin : 31.6 pg  Mean Cell Hemoglobin Concentration : 31.3 gm/dL  Auto Neutrophil # : x  Auto Lymphocyte # : x  Auto Monocyte # : x  Auto Eosinophil # : x  Auto Basophil # : x  Auto Neutrophil % : x  Auto Lymphocyte % : x  Auto Monocyte % : x  Auto Eosinophil % : x  Auto Basophil % : x    01-04    143  |  99  |  89<H>  ----------------------------<  109<H>  3.8   |  30  |  1.59<H>    Ca    8.8      04 Jan 2024 07:46  Phos  3.9     01-04  Mg     2.80     01-04    TPro  7.3  /  Alb  3.3  /  TBili  0.5  /  DBili  x   /  AST  62<H>  /  ALT  94<H>  /  AlkPhos  169<H>  01-04      RADIOLOGY & ADDITIONAL TESTS:    Imaging Personally Reviewed:  [ ] YES  [ ] NO  acetaminophen     Tablet .. 650 milliGRAM(s) Oral every 6 hours PRN  albuterol    90 MICROgram(s) HFA Inhaler 2 Puff(s) Inhalation every 6 hours PRN  allopurinol 100 milliGRAM(s) Oral daily  aluminum hydroxide/magnesium hydroxide/simethicone Suspension 30 milliLiter(s) Oral every 4 hours PRN  aspirin enteric coated 81 milliGRAM(s) Oral daily  atorvastatin 40 milliGRAM(s) Oral at bedtime  bacitracin   Ointment 1 Application(s) Topical daily  budesonide 160 MICROgram(s)/formoterol 4.5 MICROgram(s) Inhaler 2 Puff(s) Inhalation two times a day  cefTRIAXone   IVPB 1000 milliGRAM(s) IV Intermittent every 24 hours  dapagliflozin 10 milliGRAM(s) Oral daily  furosemide   Injectable 40 milliGRAM(s) IV Push daily  melatonin 3 milliGRAM(s) Oral at bedtime  metoprolol succinate ER 50 milliGRAM(s) Oral daily  metroNIDAZOLE    Tablet 500 milliGRAM(s) Oral every 8 hours  ondansetron Injectable 4 milliGRAM(s) IV Push every 8 hours PRN  pantoprazole    Tablet 40 milliGRAM(s) Oral before breakfast  polyethylene glycol 3350 17 Gram(s) Oral two times a day  senna 2 Tablet(s) Oral at bedtime  tiotropium 2.5 MICROgram(s) Inhaler 2 Puff(s) Inhalation daily      HEALTH ISSUES - PROBLEM Dx:  Pleural effusion, left    Acute cholecystitis    Acute on chronic combined systolic and diastolic congestive heart failure    Afib    Macrocytic anemia    IRINEO (acute kidney injury)    Elevated alkaline phosphatase level    Transaminitis    Need for prophylactic measure    Multiple wounds of skin

## 2024-01-04 NOTE — PROVIDER CONTACT NOTE (CHANGE IN STATUS NOTIFICATION) - ACTION/TREATMENT ORDERED:
Renee Gliagias team 8 made aware, advised to give tylenol prn and continue to monitor for now
Renee James team 8 made aware, will arrive to bedside to assess patient & will place orders accordingly.

## 2024-01-04 NOTE — PROGRESS NOTE ADULT - PROBLEM SELECTOR PLAN 10
DVT: heparin subq  Diet: Low sodium  Dispo: PT recommended rehab DVT: home eliquis  Diet: Low sodium  Dispo: PT recommended rehab, pending family decision

## 2024-01-04 NOTE — CONSULT NOTE ADULT - CONSULT REQUESTED DATE/TIME
02-Jan-2024 04:42
02-Jan-2024 10:32
03-Jan-2024 16:44
03-Jan-2024 17:34
04-Jan-2024 09:09
01-Jan-2024 23:30

## 2024-01-04 NOTE — PROGRESS NOTE ADULT - PROBLEM SELECTOR PLAN 7
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

## 2024-01-04 NOTE — PROVIDER CONTACT NOTE (CHANGE IN STATUS NOTIFICATION) - BACKGROUND
pt admitted with left pleural effusion; + acute cholecystitis; +afib; possible pigtail placement pending

## 2024-01-04 NOTE — PROVIDER CONTACT NOTE (CHANGE IN STATUS NOTIFICATION) - RECOMMENDATIONS
arrive to bedside to assess Skin; place order for anti itch cream; potential contact dermatitis?
given tylenol PRN; arrive to bedside

## 2024-01-05 NOTE — CONSULT NOTE ADULT - PROBLEM SELECTOR RECOMMENDATION 4
Attempted to contact patient to clarify appointment Monday, January 8, 2024 is FOR HER GALLBLADDER AS WELL AS FOLLOW-UP WITH bariatrics- VOICEMAIL NOT SET-UP-TH  
Patient was a hx of a cerebrovascular accident in the past  unclear idf irt was embolic from Afib or due to HTN  restart AC post op  will continue to monitor BP and adjust meds as needed

## 2024-01-05 NOTE — PROGRESS NOTE ADULT - PROBLEM SELECTOR PLAN 8
- Developed while in hospital, can be seen in acute cholecystitis  - CTM CMP

## 2024-01-05 NOTE — PROGRESS NOTE ADULT - NUTRITIONAL ASSESSMENT
This patient has been assessed with a concern for Malnutrition and has been determined to have a diagnosis/diagnoses of Severe protein-calorie malnutrition.    This patient is being managed with:   Diet Regular-  Low Sodium  Piero(7 Gm Arginine/7 Gm Glut/1.2 Gm HMB     Qty per Day:  2  Supplement Feeding Modality:  Oral  Ensure Plus High Protein Cans or Servings Per Day:  1       Frequency:  Daily  Entered: Jan 2 2024 10:37AM  
This patient has been assessed with a concern for Malnutrition and has been determined to have a diagnosis/diagnoses of Severe protein-calorie malnutrition.    This patient is being managed with:   Diet Regular-  Low Sodium  Piero(7 Gm Arginine/7 Gm Glut/1.2 Gm HMB     Qty per Day:  2  Supplement Feeding Modality:  Oral  Ensure Plus High Protein Cans or Servings Per Day:  1       Frequency:  Daily  Entered: Jan 2 2024 10:37AM  
This patient has been assessed with a concern for Malnutrition and has been determined to have a diagnosis/diagnoses of Severe protein-calorie malnutrition.    This patient is being managed with:   Diet NPO after Midnight-     NPO Start Date: 02-Jan-2024   NPO Start Time: 23:59  Except Medications  With Ice Chips/Sips of Water  Entered: Jan 2 2024  2:58PM    Diet Regular-  Low Sodium  Piero(7 Gm Arginine/7 Gm Glut/1.2 Gm HMB     Qty per Day:  2  Supplement Feeding Modality:  Oral  Ensure Plus High Protein Cans or Servings Per Day:  1       Frequency:  Daily  Entered: Jan 2 2024 10:37AM  
This patient has been assessed with a concern for Malnutrition and has been determined to have a diagnosis/diagnoses of Severe protein-calorie malnutrition.    This patient is being managed with:   Diet Regular-  Low Sodium  Piero(7 Gm Arginine/7 Gm Glut/1.2 Gm HMB     Qty per Day:  2  Supplement Feeding Modality:  Oral  Ensure Plus High Protein Cans or Servings Per Day:  1       Frequency:  Daily  Entered: Jan 2 2024 10:37AM

## 2024-01-05 NOTE — PROGRESS NOTE ADULT - PROBLEM SELECTOR PLAN 2
- S/P thoracentesis with 1 L removal (described as "blood") 12/19 - exudative, large amount of RBCs noted  - Cytopath negative for malignancy  - Transferred to [facility] for thoracic evaluation for pleurX catheter and pleuroscopy; seen by CT surgery   - thoracic surg recommended pigtail  - IR consult placed- no indication for pigtail as pt has clinically improved on diuretics  - Hold AC iso possible procedures - S/P thoracentesis with 1 L removal (described as "blood") 12/19 - exudative, large amount of RBCs noted  - Cytopath negative for malignancy  - Transferred to [facility] for thoracic evaluation for pleurX catheter and pleuroscopy; seen by CT surgery   - thoracic surg recommended pigtail  - IR consult placed- no indication for pigtail as pt has clinically improved on diuretics  - Hold AC iso possible procedures

## 2024-01-05 NOTE — PROGRESS NOTE ADULT - PROBLEM SELECTOR PROBLEM 6
Elevated alkaline phosphatase level

## 2024-01-05 NOTE — PROGRESS NOTE ADULT - SUBJECTIVE AND OBJECTIVE BOX
RAD SINGLETON  89y  Male      Patient is a 89y old  Male who presents with a chief complaint of LE blisters (02 Jan 2024 04:41)      INTERVAL HPI/OVERNIGHT EVENTS: No acute events overnight.    Patient seen and examined at bedside. Stated he is feeling well, was walking with walker. His leg swelling has improved. Denied chest pain, SOB or worsening swelling.      REVIEW OF SYSTEMS:  CONSTITUTIONAL: No fever, weight loss, or fatigue  RESPIRATORY: No cough, wheezing, chills or hemoptysis; No shortness of breath  CARDIOVASCULAR: No chest pain, palpitations, dizziness, or leg swelling  GASTROINTESTINAL: No abdominal or epigastric pain. No nausea, vomiting, or hematemesis; No diarrhea or constipation. No melena or hematochezia.  GENITOURINARY: No dysuria, frequency, hematuria, or incontinence  NEUROLOGICAL: No headaches, memory loss, loss of strength, numbness, or tremors  SKIN: No itching, burning, rashes, or lesions   LYMPH NODES: No enlarged glands  ENDOCRINE: No heat or cold intolerance; No hair loss  MUSCULOSKELETAL: No joint pain or swelling; No muscle, back, or extremity pain  PSYCHIATRIC: No depression, anxiety, mood swings, or difficulty sleeping    FAMILY HISTORY:    Vital Signs Last 24 Hrs  T(C): 36.4 (04 Jan 2024 05:40), Max: 36.4 (03 Jan 2024 16:31)  T(F): 97.5 (04 Jan 2024 05:40), Max: 97.6 (03 Jan 2024 20:09)  HR: 75 (04 Jan 2024 05:40) (64 - 84)  BP: 118/60 (04 Jan 2024 05:40) (116/59 - 125/56)  BP(mean): --  RR: 18 (04 Jan 2024 05:40) (18 - 18)  SpO2: 98% (04 Jan 2024 05:40) (95% - 100%)    Parameters below as of 04 Jan 2024 05:40  Patient On (Oxygen Delivery Method): room air        PHYSICAL EXAM:  GENERAL: NAD, well-groomed, well-developed  HEAD:  Atraumatic, Normocephalic  EYES: EOMI, PERRLA, conjunctiva and sclera clear  ENMT: No tonsillar erythema, exudates, or enlargement; Moist mucous membranes, Good dentition, No lesions  NECK: Supple, No JVD, Normal thyroid  NERVOUS SYSTEM:  Alert & Oriented X3, Good concentration; Motor Strength 5/5 B/L upper and lower extremities; DTRs 2+ intact and symmetric  CHEST/LUNG: Clear to percussion bilaterally; No rales, rhonchi, wheezing, or rubs  HEART: Regular rate and rhythm; No murmurs, rubs, or gallops  ABDOMEN: Soft, Nontender, Nondistended; Bowel sounds present  EXTREMITIES:  2+ Peripheral Pulses, No clubbing, cyanosis, or edema  LYMPH: No lymphadenopathy noted  SKIN: No rashes or lesions    Consultant(s) Notes Reviewed:  [x ] YES  [ ] NO  Care Discussed with Consultants/Other Providers [ x] YES  [ ] NO    LABS:    CBC Full  -  ( 04 Jan 2024 07:46 )  WBC Count : 8.18 K/uL  RBC Count : 2.97 M/uL  Hemoglobin : 9.4 g/dL  Hematocrit : 30.0 %  Platelet Count - Automated : 207 K/uL  Mean Cell Volume : 101.0 fL  Mean Cell Hemoglobin : 31.6 pg  Mean Cell Hemoglobin Concentration : 31.3 gm/dL  Auto Neutrophil # : x  Auto Lymphocyte # : x  Auto Monocyte # : x  Auto Eosinophil # : x  Auto Basophil # : x  Auto Neutrophil % : x  Auto Lymphocyte % : x  Auto Monocyte % : x  Auto Eosinophil % : x  Auto Basophil % : x    01-04    143  |  99  |  89<H>  ----------------------------<  109<H>  3.8   |  30  |  1.59<H>    Ca    8.8      04 Jan 2024 07:46  Phos  3.9     01-04  Mg     2.80     01-04    TPro  7.3  /  Alb  3.3  /  TBili  0.5  /  DBili  x   /  AST  62<H>  /  ALT  94<H>  /  AlkPhos  169<H>  01-04      RADIOLOGY & ADDITIONAL TESTS:    Imaging Personally Reviewed:  [ ] YES  [ ] NO  acetaminophen     Tablet .. 650 milliGRAM(s) Oral every 6 hours PRN  albuterol    90 MICROgram(s) HFA Inhaler 2 Puff(s) Inhalation every 6 hours PRN  allopurinol 100 milliGRAM(s) Oral daily  aluminum hydroxide/magnesium hydroxide/simethicone Suspension 30 milliLiter(s) Oral every 4 hours PRN  aspirin enteric coated 81 milliGRAM(s) Oral daily  atorvastatin 40 milliGRAM(s) Oral at bedtime  bacitracin   Ointment 1 Application(s) Topical daily  budesonide 160 MICROgram(s)/formoterol 4.5 MICROgram(s) Inhaler 2 Puff(s) Inhalation two times a day  cefTRIAXone   IVPB 1000 milliGRAM(s) IV Intermittent every 24 hours  dapagliflozin 10 milliGRAM(s) Oral daily  furosemide   Injectable 40 milliGRAM(s) IV Push daily  melatonin 3 milliGRAM(s) Oral at bedtime  metoprolol succinate ER 50 milliGRAM(s) Oral daily  metroNIDAZOLE    Tablet 500 milliGRAM(s) Oral every 8 hours  ondansetron Injectable 4 milliGRAM(s) IV Push every 8 hours PRN  pantoprazole    Tablet 40 milliGRAM(s) Oral before breakfast  polyethylene glycol 3350 17 Gram(s) Oral two times a day  senna 2 Tablet(s) Oral at bedtime  tiotropium 2.5 MICROgram(s) Inhaler 2 Puff(s) Inhalation daily      HEALTH ISSUES - PROBLEM Dx:  Pleural effusion, left    Acute cholecystitis    Acute on chronic combined systolic and diastolic congestive heart failure    Afib    Macrocytic anemia    IRINEO (acute kidney injury)    Elevated alkaline phosphatase level    Transaminitis    Need for prophylactic measure    Multiple wounds of skin

## 2024-01-05 NOTE — DISCHARGE NOTE NURSING/CASE MANAGEMENT/SOCIAL WORK - PATIENT PORTAL LINK FT
You can access the FollowMyHealth Patient Portal offered by [facility] by registering at the following website: [email or web address]. By joining [employer]’s FollowMyHealth portal, you will also be able to view your health information using other applications (apps) compatible with our system. You can access the FollowMyHealth Patient Portal offered by [facility] by registering at the following website: [email or web address]. By joining "[employer]"’s FollowMyHealth portal, you will also be able to view your health information using other applications (apps) compatible with our system.

## 2024-01-05 NOTE — DISCHARGE NOTE NURSING/CASE MANAGEMENT/SOCIAL WORK - NSDCPNINST_GEN_ALL_CORE
Patient A&Ox4, no c/o pain at this time, dressing was changed to carlos legs blister before discharged during this shift, dressing supplies  given to patient, iv discontinued, no distress noted.

## 2024-01-05 NOTE — DISCHARGE NOTE NURSING/CASE MANAGEMENT/SOCIAL WORK - NSDCPEFALRISK_GEN_ALL_CORE
For information on Fall & Injury Prevention, visit: [email or web address] OR  [email or web address] OR  https://www.cdc.gov/steadi/patient.html For information on Fall & Injury Prevention, visit: [email or web address] OR  [email or web address] OR  https://www.cdc.gov/steadi/patient.html

## 2024-01-05 NOTE — PROGRESS NOTE ADULT - PROBLEM SELECTOR PLAN 7
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

## 2024-01-05 NOTE — PROGRESS NOTE ADULT - PROBLEM SELECTOR PLAN 1
- pro-BNP stable, 2.8K  - TTE with EF 50-55%  - Home regimen is torsemide 100 daily per PCP Dr. Rubio - started on lasix 40 IV in GC, with Farxiga   - restarted home torsemide today as renal function back to baseline  - Strict I/os, daily weights, low salt diet - additional diuresis as needed  - Monitor on tele, monitor K on BMP

## 2024-01-05 NOTE — PROGRESS NOTE ADULT - PROVIDER SPECIALTY LIST ADULT
Internal Medicine
Thoracic Surgery
Thoracic Surgery
Surgery
Internal Medicine

## 2024-01-05 NOTE — PROGRESS NOTE ADULT - ASSESSMENT
Mr. Antony is an 89 YO man with PMH of afib on Eliquis, b/l Le blisters, CHF (EF 45-50%, combined systolic and diastolic), CVA, HTN, COPD, CKD3, recent hospitalization for CHF exacerbation 11/22-11/27 where he left AMA, presenting with concern for LE blisters, found to have acute on chronic CHF, large exudative pleural effusion s/p 1L removal, transferred from  for thoracic surgery evaluation.

## 2024-01-05 NOTE — PROGRESS NOTE ADULT - ATTENDING COMMENTS
89M w/ HTN, COPD, CKD (b/l Cr 1.6), AF (apixaban), h/o CVA, combined systolic and diastolic heart failure (EF 45-50%) with chronic LE blisters, recent hospitalization for acute decompensated heart failure (discharged before medically advised) re-admitted to [facility] for worsening LE blisters found to have acute decompensated heart failure with large left loculated pleural effusion s/p 1L thoracentesis with exudative effusion transferred to [facility] for thoracic surgery evaluation - improved after thoracentesis at  and diuresis and so not planning for any further interventions. Currently discharge planning.      - Appreciate thoracic surgery recommendations  - continue home torsemide and dapagliflozin  - Wound care consulted for leg wounds  - R DELFINO/PVR severely decreased, vascular surgery consult recommending conservative management  - PT recommending rehab after discharge, patient prefers home, currently planning for likely home with home PT and caregiver and cardiology follow-up as outpatient    Time-based billing (NON-critical care).     35 minutes spent on total encounter; more than 50% of the visit was spent counseling and / or coordinating care by the attending physician.  The necessity of the time spent during the encounter on this date of service was due to:     documentation in [place], reviewing chart and coordinating care with patient/resident and interdisciplinary staff (such as , social workers, etc) as well as reviewing vitals, laboratory data, radiology, medication list, consultants' recommendations and prior records. Interventions were performed as documented above. 89M w/ HTN, COPD, CKD (b/l Cr 1.6), AF (apixaban), h/o CVA, combined systolic and diastolic heart failure (EF 45-50%) with chronic LE blisters, recent hospitalization for acute decompensated heart failure (discharged before medically advised) re-admitted to [facility] for worsening LE blisters found to have acute decompensated heart failure with large left loculated pleural effusion s/p 1L thoracentesis with exudative effusion transferred to [facility] for thoracic surgery evaluation - improved after thoracentesis at  and diuresis and so not planning for any further interventions. Currently discharge planning.      - Appreciate thoracic surgery recommendations  - continue home torsemide and dapagliflozin  - Wound care consulted for leg wounds  - R DELFINO/PVR severely decreased, vascular surgery consult recommending conservative management  - PT recommending rehab after discharge, patient prefers home, currently planning for likely home with home PT and caregiver and cardiology follow-up as outpatient    Time-based billing (NON-critical care).     35 minutes spent on total encounter; more than 50% of the visit was spent counseling and / or coordinating care by the attending physician.  The necessity of the time spent during the encounter on this date of service was due to:     documentation in [place], reviewing chart and coordinating care with patient/resident and interdisciplinary staff (such as , social workers, etc) as well as reviewing vitals, laboratory data, radiology, medication list, consultants' recommendations and prior records. Interventions were performed as documented above.

## 2024-01-10 PROBLEM — J44.9 COPD (CHRONIC OBSTRUCTIVE PULMONARY DISEASE): Status: ACTIVE | Noted: 2024-01-01

## 2024-01-10 PROBLEM — I50.9 CHF (CONGESTIVE HEART FAILURE): Status: ACTIVE | Noted: 2023-02-15

## 2024-01-10 PROBLEM — I73.9 PAD (PERIPHERAL ARTERY DISEASE): Status: ACTIVE | Noted: 2024-01-01

## 2024-01-10 PROBLEM — T14.8XXA MULTIPLE WOUNDS OF SKIN: Status: ACTIVE | Noted: 2024-01-01

## 2024-01-10 PROBLEM — Z86.73 HISTORY OF CVA (CEREBROVASCULAR ACCIDENT): Status: RESOLVED | Noted: 2023-02-15 | Resolved: 2024-01-01

## 2024-01-10 PROBLEM — I48.91 A-FIB: Status: ACTIVE | Noted: 2023-02-15

## 2024-01-10 NOTE — PLAN
[FreeTextEntry1] : -Follow up with PCP/cards Dr. Nieves today -Follow up with Pulmonary 1/18 -Continue Wound care as per wound care nurse, keep area clean  Educated patient and family on fall and safety precautions, aspiration precautions, heart healthy diet, daily weights, medication compliance, smoking cessation, good skin care and monitor/notify MD/NP for signs/symptoms of fluid overload and electrolyte abnormalities, such as, shortness of breath, cough, swelling, chest discomfort, changes in heart rate, dizziness, fainting, or changes in mental status. Reinforced provider follow up. Encouraged home physical therapy to strengthen muscles. 24/7 TCM contact provided and encouraged to call with any questions or concerns.

## 2024-01-10 NOTE — PHYSICAL EXAM
[No Acute Distress] : no acute distress [Normal Sclera/Conjunctiva] : normal sclera/conjunctiva [Normal Outer Ear/Nose] : the outer ears and nose were normal in appearance [No Respiratory Distress] : no respiratory distress  [No Accessory Muscle Use] : no accessory muscle use [Normal Rate] : normal rate  [Normal Affect] : the affect was normal [Alert and Oriented x3] : oriented to person, place, and time [de-identified] : diminished BS on L [de-identified] : +2-3 B/L LE edema [de-identified] : B/L LE wrapped with ace bandage, changed by wound care today [de-identified] : ambulates with walker no

## 2024-01-10 NOTE — HISTORY OF PRESENT ILLNESS
[Post-hospitalization from ___ Hospital] : Post-hospitalization from [unfilled] Hospital [Admitted on: ___] : The patient was admitted on [unfilled] [Discharged on ___] : discharged on [unfilled] [FreeTextEntry3] : F/u post hospital discharge STAR Heart Failure [FreeTextEntry2] : Mr. Antony is an 88 yo man with PMH of afib on Eliquis, b/l Le blisters, CHF (EF 45-50%, combined systolic and diastolic), CVA, HTN, COPD, CKD3, recent hospitalization for CHF exacerbation 11/22-11/27 where he left AMA, presenting with concern for LE blisters. He was [facility]/18 and transferred to [facility] on 1/1, discharged to home on 1/5.  At [place], CT with large left sided loculated effusion, cardiomegaly with BNP 2.8K. Echo showed LVEF 50-55%, moderate mitral stenosis. He was hypoxic on RA to 89%. Cardiology was consulted and recommended diuresis. Pulmonology was consulted for thoracentesis, which was done and 1L removed. His symptoms improved and pulmonology recommended transfer to [facility] for placement of pleurX with thoracic surgery.   At [facility], IR consult placed with no indication for pigtail as pt has clinically improved on diuretics. Acute cholecystitis treated with ceftriaxone, flagyl and HIDA showed no cholecystitis. Seen by wound for LE blisters. PT recommended [place], declined and discharged with home care services.    Upon arrival, Mr. Antony is observed seated in recliner, appears pleasant and in nad, family present (spouse, daughter, son in law and  Antonietta over the phone).  Since discharge, still feels lousy, breathing improved from last week, but still with HER. +LE edema, the same and seen by wound care RN today, legs wrapped. Appetite remains the same, +BM. 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 No fever/chills, CP, abdominal pain, nausea, vomiting or diarrhea.  Current Cigar Smoker and former cigarette smoker quit ~60 years ago.  Missing inhalers from discharge and farxiga was not covered, 700$.  He is scheduled to see his PCP/cards Dr. Nieves this afternoon and will review medication regime Has appt to see pulmonary 1/18. Missing rx sent in to preferred pharmacy Refusing home PT at this time and ambulates with walker.

## 2024-01-10 NOTE — COUNSELING
[Fall prevention counseling provided] : Fall prevention counseling provided [Use recommended devices] : Use recommended devices [Yes] : Risk of tobacco use and health benefits of smoking cessation discussed: Yes [No] : Not willing to quit smoking [Patient Non-adherent to care plan] : Patient non-adherent to care plan [Needs reinforcement, provided] : Patient needs reinforcement on understanding of lifestyle changes and steps needed to achieve self management goal; reinforcement was provided

## 2024-01-10 NOTE — HEALTH RISK ASSESSMENT
[Low Salt Diet] : low salt [With Family] : lives with family [Current] : Current [Change in mental status noted] : No change in mental status noted [] :  [I will adhere to the patient's wishes.] : I will adhere to the patient's wishes. [AdvancecareDate] : 1/10/2024 [FreeTextEntry4] : Goals of care discussed, names daughter Frida and  Antonietta as his health care proxies. Discussed advanced care directives, pt unsure of what he would want and shared that his son passed away 2 years ago and son was in coma for 10 months. States he would not want that for himself and will discuss further with PCP and family.

## 2024-01-10 NOTE — REVIEW OF SYSTEMS
[Fatigue] : fatigue [Lower Ext Edema] : lower extremity edema [Cough] : cough [Dyspnea on Exertion] : dyspnea on exertion [Joint Pain] : joint pain [Negative] : Genitourinary [Fever] : no fever [Chills] : no chills

## 2024-01-17 NOTE — HISTORY OF PRESENT ILLNESS
[TextBox_4] : Mr. Antony is an 89 year-old male with a history of A-Fib on apixaban, bilateral lower extremity blisters, HFrEF (EF 45%), history of CVA, HTN, COPD, CKD3, and recent hospitalization for for large left sided loculated pleural effusion with associated BNP 2800. Echo showed LVEF 50-55%, moderate mitral stenosis. He was hypoxic on RA to 89%. He was diuresed. S/p thoracentesis on 12/19, fluid exudative, monocyte-predominant, cytopathology negative for malignant cells. Symptoms improved and he was transferred to [facility] for thoracic surgery evaluation and possible PleurX catheter placement. Seen by thoracic surgery and pigtail catheter placed. Furosemide increased to 40 mg bid. However, he developed IRINEO on CKD and furosemide was discontinued. 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 His IRINEO improved and he was restarted on home diuretic medication torsemide.   CT Chest (Dec 2023) with band-like opacity within the right lower lobe, likely related to atelectasis. Opacity within the lingular segment of the left upper lobe and left lower lobe is consistent with atelectasis; however, underlying parenchymal infiltrate/consolidation cannot be excluded. No right pleural effusion. Moderate left pleural effusion. No pneumothorax. Mediastinal lymph nodes identified measuring up to 2.6 x 2.0 cm. No definite hilar lymphadenopathy.  Discharge meds include tylenol, albuterol, allopurinol, apixaban 2.5 mg bid, aspirin 81, atorvastatin 40, Symbicort 160-4.5 mcg 2 puffs twice daily, Farxiga, melatonin, metoprolol succinate 50, pantoprazole, senna, miralax, Spiriva, Torsemide 100 qd

## 2024-02-02 NOTE — PHYSICAL THERAPY INITIAL EVALUATION ADULT - ASSISTIVE DEVICE FOR TRANSFER: SIT/STAND, REHAB EVAL
Subjective:       Patient ID: Matthew Mejia is a 71 y.o. male.    Chief Complaint:  Here for prostate cancer follow-up    Diagnosis: Locally advanced, high-grade prostate cancer        + COVID-19 vaccinated    Treatment History  XRT + Lupron completed 3/1/18  Zytiga/Prednisone 3/18-12/19 --> Lupron completed 9/19    Current Treatment: Observation    Clinical History:  Patient has a history of BPH with a mildly elevated PSA level for many years. He had previous benign prostate biopsies in 2008. Routine lab work 6/14/17 showed further increase in his PSA level to 7.5 ng/mL with a serum testosterone of 547 ng/dL. He was treated with antibiotics but his PSA level remained elevated. Ultrasound was abnormal. MRI of the prostate 8/18/17 showed a volume of 38 grams with a 2.2 cm aggressive midline peripheral zone lesion at the base and mid gland. There was obliteration of the right rectal prostatic angle at the mid gland consistent with extracapsular disease and encasement of the intra-prostatic seminal vesicle origins. There was no neurovascular bundle involvement. There was no abnormal pelvic adenopathy.    On 9/20/17, he underwent cystoscopy with bilateral retrogrades, bladder biopsies and ultrasound-guided prostate biopsies. 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 Prostate biopsies were positive for adenocarcinoma, [place]'s 4+4 = 8 bilaterally with perineural extension on the right. He was evaluated by Dr. Sid Puckett at [facility] for a surgical opinion 10/12/17. Given his extraprostatic extension, primary surgery was not recommended. A bone scan was recommended to complete staging followed by androgen deprivation therapy and EBRT. He was referred to Dr. Martínez for a Radiation Oncology opinion. He underwent a CT PET scan 10/17/17 which showed no abnormal hypermetabolic activity to indicate metastatic disease. Whole-body bone scan 10/19/17 was  negative for abnormal osseous uptake. He was started on Casodex 50 mg daily followed by Lupron prior to starting radiation therapy.    He was seen as a new patient at [facility] 10/30/17 for treatment recommendations. Treatment with Zytiga and prednisone in combination with Lupron was recommended for up to 2 years as adjuvant therapy based on results from the STAMPEDE trial showing significant improvement in failure free survival versus androgen deprivation therapy alone. Follow-up PSA level 8/16/18 was <0.01 ng/mL. 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 Colonoscopy 9/26/18 showed minor radiation scarring and no evidence of polyps. His prednisone was gradually tapered back to a maintenance dose of 5 mg daily in combination with his Zytiga. PSA level has remained undetectable.    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 He was evaluated by Ortho and referred for physical therapy with improvement in her symptoms. His Lupron was discontinued 9/26/19.  He completed his intended course of Zytiga and Prednisone 12/19.  PSA level has remained suppressed off of hormonal therapy.    Interval History   Mr. Mejia is here today by himself for a three-month follow-up visit regarding his prostate cancer.  At the time of his last visit, his PSA level showed a minimal increase from 0.23 to 0.27.  Patient had no associated symptoms.  Repeat level 1/29/24 is stable at 0.27.  Unfortunately he reports progressive perineal pain that is neuropathic in nature.  It wakes him up at night.  This has been a source of discomfort for him and discussed at several of his most recent visits.  Unfortunately, he is experiencing memory issues, more so in the last 6 months.  Dr. Early mentioned Aricept at his last visit.  Patient is still driving and very active around his house.  He has an appointment  with Internal Medicine for routine follow-up later this week.      Review of Systems   Constitutional:  Negative for appetite change, fatigue, fever and unexpected weight change.   HENT:  Negative for mouth sores, sore throat and trouble swallowing.    Eyes: Negative.  Negative for visual disturbance.   Respiratory:  Negative for cough and shortness of breath.    Cardiovascular:  Negative for chest pain, palpitations and leg swelling.   Gastrointestinal:  Negative for abdominal distention, abdominal pain, constipation, diarrhea, nausea and vomiting.   Genitourinary:  Positive for urgency. Negative for difficulty urinating, dysuria and frequency.        Mild perineal discomfort, neuropathic in nature.  Weak stream at times   Musculoskeletal:  Positive for arthralgias and back pain.   Integumentary:  Negative for pallor and rash.   Neurological:  Negative for dizziness, weakness, numbness and headaches.   Hematological:  Negative for adenopathy. Does not bruise/bleed easily.   Psychiatric/Behavioral:  Negative for confusion. The patient is nervous/anxious.         Poor short-term memory         PMHx:  BPH, squamous cell carcinoma of the scalp, shingles  PSHx: Prostate biopsy, carpal tunnel release, tonsillectomy, eye surgery, skin cancer scalp  SH:  Lifetime nonsmoker, + social alcohol use. Retired from the technology industry. Lives in [place] with his wife.  FH:  His father had colon cancer, mother had breast cancer. His paternal grandfather had stomach cancer.       Objective:     Vitals:    02/05/24 1346   BP: 131/77   Pulse: (!) 58   Resp: 16   Temp: 98.5 °F (36.9 °C)         Physical Exam  Constitutional:       Appearance: Normal appearance.      Comments: Elderly, well-developed white male in NAD.   HENT:      Head: Normocephalic.      Mouth/Throat:      Mouth: Mucous membranes are moist.      Pharynx: Oropharynx is clear. No posterior oropharyngeal erythema.   Eyes:      Extraocular Movements: Extraocular  movements intact.      Conjunctiva/sclera: Conjunctivae normal.      Pupils: Pupils are equal, round, and reactive to light.   Cardiovascular:      Rate and Rhythm: Normal rate and regular rhythm.      Heart sounds: No murmur heard.  Pulmonary:      Comments: Lungs clear to auscultation.  Abdominal:      General: Bowel sounds are normal. There is no distension.      Palpations: Abdomen is soft. There is no mass.      Tenderness: There is no abdominal tenderness.   Musculoskeletal:         General: No swelling or tenderness. Normal range of motion.      Cervical back: Neck supple. No tenderness.   Skin:     General: Skin is warm and dry.      Findings: No rash.   Neurological:      Mental Status: He is alert and oriented to person, place, and time.      Cranial Nerves: No cranial nerve deficit.      Motor: No weakness.   Psychiatric:         Mood and Affect: Mood normal.         Behavior: Behavior normal.       ECOG SCORE    1 - Restricted in strenuous activity-ambulatory and able to carry out work of a light nature             LABORATORY  Recent Results (from the past 336 hour(s))   Comprehensive Metabolic Panel    Collection Time: 01/29/24  3:23 PM   Result Value Ref Range    Sodium Level 143 136 - 145 mmol/L    Potassium Level 4.4 3.5 - 5.1 mmol/L    Chloride 109 (H) 98 - 107 mmol/L    Carbon Dioxide 28 23 - 31 mmol/L    Glucose Level 86 82 - 115 mg/dL    Blood Urea Nitrogen 15.2 8.4 - 25.7 mg/dL    Creatinine 0.86 0.73 - 1.18 mg/dL    Calcium Level Total 9.4 8.8 - 10.0 mg/dL    Protein Total 6.4 5.8 - 7.6 gm/dL    Albumin Level 4.1 3.4 - 4.8 g/dL    Globulin 2.3 (L) 2.4 - 3.5 gm/dL    Albumin/Globulin Ratio 1.8 1.1 - 2.0 ratio    Bilirubin Total 0.4 <=1.5 mg/dL    Alkaline Phosphatase 71 40 - 150 unit/L    Alanine Aminotransferase 28 0 - 55 unit/L    Aspartate Aminotransferase 28 5 - 34 unit/L    eGFR >60 mls/min/1.73/m2   PSA, Total (Diagnostic)    Collection Time: 01/29/24  3:23 PM   Result Value Ref Range     Prostate Specific Antigen 0.27 <=4.00 ng/mL                10/11/22   1/10/23   4/17/23   7/20/23   10/20/23  1/29/24  PSA     0.28         0.35        0.31        0.23        0.27        0.27       Assessment:   Locally advanced high-grade prostate cancer - clinical T3 with extraprostatic extension and no evidence of metastatic disease  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  RTC in 3 months for a follow-up visit with a CMP and PSA level.  Consider PSMA scan if PSA rises to >1.0 ng/mL.  Patient has chronic perineal and scrotal pain which is neuropathic in nature and progressive by his report.  This is difficult to assess given his underlying memory issues.  We discussed pharmacologic management of his neurologic pain.  Unfortunately all agents have potential side effects that may worsen his underlying memory issues.  I will discuss with Dr. Early.  Patient in agreement with the treatment plan as outlined above.  All questions answered.    PARKER MORILLO, FNP-C  [facility]     Other Physicians  Dr. Yonny Harkins      rolling walker

## 2024-02-06 NOTE — ED PROVIDER NOTE - OBJECTIVE STATEMENT
left cp and sob x a few days.  OTC oyxgen as needed.  Ho of pleural effusion on left side and was drained .  Dr. Rubio cardiologist at [facility]. left cp and sob x a few days.  OTC oyxgen as needed.  Ho of pleural effusion on left side and was drained .  Dr. Rubio cardiologist at [facility].  Pt is a poor historian.  HO from son-in-law.  No other complaints.

## 2024-02-06 NOTE — H&P ADULT - NSHPSOCIALHISTORY_GEN_ALL_CORE
Patient lives at home with his ex wife, daughter, and son in law. Patient ambulates independently, no longer works.

## 2024-02-06 NOTE — H&P ADULT - CONVERSATION DETAILS
Patient Education        Low Back Pain: Exercises  Introduction  Here are some examples of exercises for you to try. The exercises may be suggested for a condition or for rehabilitation. Start each exercise slowly. Ease off the exercises if you start to have pain. You will be told when to start these exercises and which ones will work best for you. How to do the exercises  Press-up   1. Lie on your stomach, supporting your body with your forearms. 2. Press your elbows down into the floor to raise your upper back. As you do this, relax your stomach muscles and allow your back to arch without using your back muscles. As your press up, do not let your hips or pelvis come off the floor. 3. Hold for 15 to 30 seconds, then relax. 4. Repeat 2 to 4 times. Alternate arm and leg (bird dog) exercise   Do this exercise slowly. Try to keep your body straight at all times, and do not let one hip drop lower than the other. 1. Start on the floor, on your hands and knees. 2. Tighten your belly muscles. 3. Raise one leg off the floor, and hold it straight out behind you. Be careful not to let your hip drop down, because that will twist your trunk. 4. Hold for about 6 seconds, then lower your leg and switch to the other leg. 5. Repeat 8 to 12 times on each leg. 6. Over time, work up to holding for 10 to 30 seconds each time. 7. If you feel stable and secure with your leg raised, try raising the opposite arm straight out in front of you at the same time. Knee-to-chest exercise   1. Lie on your back with your knees bent and your feet flat on the floor. 2. Bring one knee to your chest, keeping the other foot flat on the floor (or keeping the other leg straight, whichever feels better on your lower back). 3. Keep your lower back pressed to the floor. Hold for at least 15 to 30 seconds. 4. Relax, and lower the knee to the starting position. 5. Repeat with the other leg. Repeat 2 to 4 times with each leg.   6. To get more stretch, put your other leg flat on the floor while pulling your knee to your chest.    Curl-ups   1. Lie on the floor on your back with your knees bent at a 90-degree angle. Your feet should be flat on the floor, about 12 inches from your buttocks. 2. Cross your arms over your chest. If this bothers your neck, try putting your hands behind your neck (not your head), with your elbows spread apart. 3. Slowly tighten your belly muscles and raise your shoulder blades off the floor. 4. Keep your head in line with your body, and do not press your chin to your chest.  5. Hold this position for 1 or 2 seconds, then slowly lower yourself back down to the floor. 6. Repeat 8 to 12 times. Pelvic tilt exercise   1. Lie on your back with your knees bent. 2. \"Brace\" your stomach. This means to tighten your muscles by pulling in and imagining your belly button moving toward your spine. You should feel like your back is pressing to the floor and your hips and pelvis are rocking back. 3. Hold for about 6 seconds while you breathe smoothly. 4. Repeat 8 to 12 times. Heel dig bridging   1. Lie on your back with both knees bent and your ankles bent so that only your heels are digging into the floor. Your knees should be bent about 90 degrees. 2. Then push your heels into the floor, squeeze your buttocks, and lift your hips off the floor until your shoulders, hips, and knees are all in a straight line. 3. Hold for about 6 seconds as you continue to breathe normally, and then slowly lower your hips back down to the floor and rest for up to 10 seconds. 4. Do 8 to 12 repetitions. Hamstring stretch in doorway   1. Lie on your back in a doorway, with one leg through the open door. 2. Slide your leg up the wall to straighten your knee. You should feel a gentle stretch down the back of your leg. 3. Hold the stretch for at least 15 to 30 seconds. Do not arch your back, point your toes, or bend either knee.  Keep one heel touching the floor and the other heel touching the wall. 4. Repeat with your other leg. 5. Do 2 to 4 times for each leg. Hip flexor stretch   1. Kneel on the floor with one knee bent and one leg behind you. Place your forward knee over your foot. Keep your other knee touching the floor. 2. Slowly push your hips forward until you feel a stretch in the upper thigh of your rear leg. 3. Hold the stretch for at least 15 to 30 seconds. Repeat with your other leg. 4. Do 2 to 4 times on each side. Wall sit   1. Stand with your back 10 to 12 inches away from a wall. 2. Lean into the wall until your back is flat against it. 3. Slowly slide down until your knees are slightly bent, pressing your lower back into the wall. 4. Hold for about 6 seconds, then slide back up the wall. 5. Repeat 8 to 12 times. Follow-up care is a key part of your treatment and safety. Be sure to make and go to all appointments, and call your doctor if you are having problems. It's also a good idea to know your test results and keep a list of the medicines you take. Where can you learn more? Go to https://TapTrak.[employer]. org and sign in to your In Flow account. Enter M445 in the SIPX box to learn more about \"Low Back Pain: Exercises. \"     If you do not have an account, please click on the \"Sign Up Now\" link. Current as of: March 2, 2020               Content Version: 12.5  © 2006-2020 Healthwise, Incorporated. Care instructions adapted under license by [facility]. If you have questions about a medical condition or this instruction, always ask your healthcare professional. [place] [address] any warranty or liability for your use of this information. Patient Education        Back Pain: Care Instructions  Your Care Instructions     Back pain has many possible causes. It is often related to problems with muscles and ligaments of the back.  It may also be related to problems with the nerves, discs, or bones of the back. Moving, lifting, standing, sitting, or sleeping in an awkward way can strain the back. Sometimes you don't notice the injury until later. Arthritis is another common cause of back pain. Although it may hurt a lot, back pain usually improves on its own within several weeks. Most people recover in 12 weeks or less. Using good home treatment and being careful not to stress your back can help you feel better sooner. Follow-up care is a key part of your treatment and safety. Be sure to make and go to all appointments, and call your doctor if you are having problems. It's also a good idea to know your test results and keep a list of the medicines you take. How can you care for yourself at home? · Sit or lie in positions that are most comfortable and reduce your pain. Try one of these positions when you lie down:  ? Lie on your back with your knees bent and supported by large pillows. ? Lie on the floor with your legs on the seat of a sofa or chair. ? Lie on your side with your knees and hips bent and a pillow between your legs. ? Lie on your stomach if it does not make pain worse. · Do not sit up in bed, and avoid soft couches and twisted positions. Bed rest can help relieve pain at first, but it delays healing. Avoid bed rest after the first day of back pain. · Change positions every 30 minutes. If you must sit for long periods of time, take breaks from sitting. Get up and walk around, or lie in a comfortable position. · Try using a heating pad on a low or medium setting for 15 to 20 minutes every 2 or 3 hours. Try a warm shower in place of one session with the heating pad. · You can also try an ice pack for 10 to 15 minutes every 2 to 3 hours. Put a thin cloth between the ice pack and your skin. · Take pain medicines exactly as directed. ? If the doctor gave you a prescription medicine for pain, take it as prescribed.   ? If you are not taking a prescription pain medicine, ask your doctor if you can take an over-the-counter medicine. · Take short walks several times a day. You can start with 5 to 10 minutes, 3 or 4 times a day, and work up to longer walks. Walk on level surfaces and avoid hills and stairs until your back is better. · Return to work and other activities as soon as you can. Continued rest without activity is usually not good for your back. · To prevent future back pain, do exercises to stretch and strengthen your back and stomach. Learn how to use good posture, safe lifting techniques, and proper body mechanics. When should you call for help? Call your doctor now or seek immediate medical care if:  · You have new or worsening numbness in your legs. · You have new or worsening weakness in your legs. (This could make it hard to stand up.)  · You lose control of your bladder or bowels. Watch closely for changes in your health, and be sure to contact your doctor if:  · You have a fever, lose weight, or don't feel well. · You do not get better as expected. Where can you learn more? Go to https://Ark[employer]. org and sign in to your [employer] account. Enter P525 in the ScaleIO box to learn more about \"Back Pain: Care Instructions. \"     If you do not have an account, please click on the \"Sign Up Now\" link. Current as of: March 2, 2020               Content Version: 12.5  © 3987-6827 Healthwise, Incorporated. Care instructions adapted under license by [facility] ([facility]). If you have questions about a medical condition or this instruction, always ask your healthcare professional. [place] [address] any warranty or liability for your use of this information. Patient wishes to remain full code at this time

## 2024-02-06 NOTE — ED ADULT NURSE NOTE - OBJECTIVE STATEMENT
Patient presents to ED with complaint of  left cp and sob x a few days.  patient states he has a history of pleural effusion  on left side and had it drained.  Dr. Rubio cardiologist at [facility].  Alert and oriented x 4. No signs or symptoms of nausea, vomting, dizziness or SOB.

## 2024-02-06 NOTE — PATIENT PROFILE ADULT - FALL HARM RISK - HARM RISK INTERVENTIONS

## 2024-02-06 NOTE — ED ADULT TRIAGE NOTE - CHIEF COMPLAINT QUOTE
Pt reports shortness of breath hx of chf. As per family pt had "lung drained last month and one time previous to that as well" Pt reports episode of chest pain yesterday, denies chest pain today.

## 2024-02-06 NOTE — H&P ADULT - HISTORY OF PRESENT ILLNESS
89 year old male with PMH of afib on Eliquis, b/l Le blisters, CHF (EF 45-50%, combined systolic and diastolic), CVA, HTN, COPD, CKD3, multiple hospitalizations for CHF exacerbation in the prior months. Patient also recently had a left sided pleural effusion that was drained last hospitalization. Patient presents to [place] ED today with shortness of breath and left sided chest pressure which has been progressing over the past week. Patient states that he has been compliant with medications, and no recent illness. Patient denies radiating pain from chest. Chest pain intermittent as per patient. Patient stated that he has noticed minor leg swelling. Patient endorses lethargy. Patient denies nausea, vomiting, and or cough. Patient uses OTC oxygen cans at home as needed. Chest xray performed in the ED displays an enlarged heart with large effusion. Patient to be admitted to medical service for CHF exacerbation.

## 2024-02-06 NOTE — H&P ADULT - NS ATTEND AMEND GEN_ALL_CORE FT
89 y/o M with PMH of afib on Eliquis, chronic LE blisters/wounds, CHF (EF 45-50%, combined systolic and diastolic), CVA, HTN, COPD, CKD3, multiple hospitalizations for CHF exacerbation, most recently at [facility], discharged home, f/u with cardio. Endorses compliance with home meds, c/o progressive SOB, HER, admitted for acute chf exacerbation, persistent large pleural effusion. Continue IV lasix, pulm, cardio eval. supplemental O2 as indicated.

## 2024-02-06 NOTE — PATIENT PROFILE ADULT - HOW PATIENT ADDRESSED, PROFILE
Implemented All Universal Safety Interventions:  [place] to call system. Call bell, personal items and telephone within reach. Instruct patient to call for assistance. Room bathroom lighting operational. Non-slip footwear when patient is off stretcher. Physically safe environment: no spills, clutter or unnecessary equipment. Stretcher in lowest position, wheels locked, appropriate side rails in place. [place]

## 2024-02-06 NOTE — ED PROVIDER NOTE - HOW PATIENT ADDRESSED, PROFILE
Vaginitis evaluation    Chief Complaint   Vaginitis and Depo      HPI  23 y.o. female complains of yellow vaginal discharge for 1 month. No LMP recorded. Patient has had an injection. She denies additional symptoms at this time. The patient denies aggravating factors. She is concerned about possible STI exposure at this time. She denies exposure to new chemicals or hygenic agents  Previous treatment included: none    Past Medical History:   Diagnosis Date    Asthma     Chlamydia 01/31/2020    Eczema     History of chicken pox     at age 3    HPV vaccine counseling     3/3 Gardasil received     History reviewed. No pertinent surgical history. Social History     Occupational History    Not on file   Tobacco Use    Smoking status: Never Smoker    Smokeless tobacco: Never Used   Substance and Sexual Activity    Alcohol use: No    Drug use: No    Sexual activity: Yes     History reviewed. No pertinent family history. No Known Allergies  Prior to Admission medications    Medication Sig Start Date End Date Taking? Authorizing Provider   medroxyPROGESTERone (DEPO-PROVERA) 150 mg/mL syrg INJECT 1ML INTO THE MUSCLE ONCE FOR A ONE TIME DOSE 11/12/20   Provider, Claudia   albuterol (PROVENTIL HFA, VENTOLIN HFA, PROAIR HFA) 90 mcg/actuation inhaler Take 2 Puffs by inhalation every four (4) hours as needed for Wheezing or Shortness of Breath. 4/2/20   Rashaun Friend MD   fluticasone propionate (FLOVENT HFA) 110 mcg/actuation inhaler Take 2 Puffs by inhalation every twelve (12) hours. 4/2/20   Rashaun Friend MD   triamcinolone acetonide (KENALOG) 0.1 % topical cream Apply  to affected area two (2) times a day. use thin layer legs x 3 weeks 10/24/19   Chikis Bañuelos MD   loratadine (CLARITIN) 10 mg tablet Take 1 Tab by mouth daily.  9/22/16   Porsche Dangelo, NP                      Review of Systems - History obtained from the patient  Constitutional: negative for weight loss, fever, night sweats  Breast: negative for breast lumps, nipple discharge, galactorrhea  GI: negative for change in bowel habits, abdominal pain, black or bloody stools  : negative for frequency, dysuria, hematuria  MSK: negative for back pain, joint pain, muscle pain  Skin: negative for itching, rash, hives  Neuro: negative for dizziness, headache, confusion, weakness  Psych: negative for anxiety, depression, change in mood  Heme/lymph: negative for bleeding, bruising, pallor       Objective:    Visit Vitals  Breastfeeding No       Physical Exam:   PHYSICAL EXAMINATION    Constitutional  · Appearance: well-nourished, well developed, alert, in no acute distress    HENT  · Head and Face: appears normal    Genitourinary  · External Genitalia: normal appearance for age, some discharge present, no tenderness present, no inflammatory lesions present, no masses present, no atrophy present  · Vagina:  Normal appearing discharge present, otherwise normal vaginal vault without central or paravaginal defects, no inflammatory lesions present, no masses present  · Bladder: non-tender to palpation  · Urethra: appears normal  · Cervix: normal   · Uterus: normal size, shape and consistency  · Adnexa: no adnexal tenderness present, no adnexal masses present  · Perineum: perineum within normal limits, no evidence of trauma, no rashes or skin lesions present  · Anus: anus within normal limits, no hemorrhoids present  · Inguinal Lymph Nodes: no lymphadenopathy present    Skin  · General Inspection: no rash, no lesions identified    Neurologic/Psychiatric  · Mental Status:  · Orientation: grossly oriented to person, place and time  · Mood and Affect: mood normal, affect appropriate      No results found for any visits on 04/20/21. Assessment:   no pathogens identified causing these symptoms    Plan:   Treatment: NS sent -- call results. ROV prn if symptoms persist or worsen. Jaylon

## 2024-02-06 NOTE — PATIENT PROFILE ADULT - VISION (WITH CORRECTIVE LENSES IF THE PATIENT USUALLY WEARS THEM):
Not applicable Normal vision: sees adequately in most situations; can see medication labels, newsprint

## 2024-02-06 NOTE — ED ADULT NURSE NOTE - CADM POA CENTRAL LINE
24    Patient: Jayro Franks  : 1976 Visit date: 2024    Dear  Lesly Holguin MD    Thank you for referring Jayro Franks to my practice.  Please find my assessment and plan below.     Assessment   1. Sebaceous cyst          Plan     The patient will be scheduled for in -office excision of epidermoid cyst with layered closure.    The kelsey-operative care plan was discussed with the patient, who voices understanding.  Activity and lifting recommendations were discussed in length.     The risks, benefits, and alternatives to the procedure were explained to the patient.  The risks explained include, but are not limited to, bleeding, infection, pain wound complications, recurrence, incorrect diagnosis, injury to adjacent organs and structures. We also discussed the possibile need for further therapeutic, diagnostic, or surgical intervention.  The patient voiced understanding, and after all questions were answered to the patient's satisfaction, the patient provided willing and informed consent to proceed.      Sincerely,       Davon Hall MD   CC:   No Recipients     No

## 2024-02-06 NOTE — ED ADULT NURSE NOTE - NSFALLHARMRISKINTERV_ED_ALL_ED

## 2024-02-06 NOTE — H&P ADULT - NSHPREVIEWOFSYSTEMS_GEN_ALL_CORE
REVIEW OF SYSTEMS:  CONSTITUTIONAL: No fever, weight loss, endorses fatigue   EYES: No eye pain, visual disturbances, or discharge  ENMT:  No difficulty hearing, tinnitus, vertigo; No sinus or throat pain  RESPIRATORY: No cough, wheezing, chills or hemoptysis; Endorses shortness of breath  CARDIOVASCULAR: intermittent chest pain, denies palpitations, dizziness, endorses leg swelling   GASTROINTESTINAL: No abdominal pain, No nausea, vomiting, or hematemesis; No diarrhea or constipation  GENITOURINARY: No dysuria, frequency, hematuria, or incontinence  NEUROLOGICAL: No headaches, memory loss, loss of strength, numbness, or tremors  SKIN: No itching, burning, rashes, left lower leg anterior blister   LYMPH NODES: No enlarged glands  ENDOCRINE: No heat or cold intolerance; No hair loss  MUSCULOSKELETAL: No joint pain or swelling; No muscle, back, or extremity pain  PSYCHIATRIC: No depression, anxiety, mood swings, or difficulty sleeping  HEME/LYMPH: No easy bruising or bleeding  ALLERGY AND IMMUNOLOGIC: No hives or eczema    All other ROS reviewed and negative except as otherwise stated

## 2024-02-06 NOTE — H&P ADULT - NSHPLABSRESULTS_GEN_ALL_CORE
11.6   8.05  )-----------( 194      ( 06 Feb 2024 16:22 )             36.8       02-06    147<H>  |  102  |  44<H>  ----------------------------<  110<H>  3.5   |  36<H>  |  1.66<H>    Ca    9.3      06 Feb 2024 16:22    TPro  8.2  /  Alb  2.9<L>  /  TBili  0.7  /  DBili  x   /  AST  22  /  ALT  11  /  AlkPhos  73  02-06              Urinalysis Basic - ( 06 Feb 2024 16:22 )    Color: x / Appearance: x / SG: x / pH: x  Gluc: 110 mg/dL / Ketone: x  / Bili: x / Urobili: x   Blood: x / Protein: x / Nitrite: x   Leuk Esterase: x / RBC: x / WBC x   Sq Epi: x / Non Sq Epi: x / Bacteria: x            Lactate Trend            CAPILLARY BLOOD GLUCOSE            < from: Xray Chest 1 View- PORTABLE-Urgent (02.06.24 @ 16:50) >    Heart quite enlarged.    There is a very large left effusion may be minimally improved compared to   January 2.    IMPRESSION: Heart enlargement and quite large left effusion.    < end of copied text >

## 2024-02-06 NOTE — H&P ADULT - ASSESSMENT
89 year old male with PMH of afib on Eliquis, b/l Le blisters, CHF (EF 45-50%, combined systolic and diastolic), CVA, HTN, COPD, CKD3, multiple hospitalizations for CHF exacerbation in the prior months. Patient also recently had a left sided pleural effusion that was drained last hospitalization. Chest xray performed in the ED displays an enlarged heart with large effusion. Patient to be admitted for CHF exacerbation.     #SOB 2/2 CHF exacerbation  #Hx effusion drainage, multiple admissions  - admit to medicine  - currently on RA saturating 93-95% at rest  - CXR: Heart enlargement and quite large left effusion  - IV lasix given in ED   - Troponin negative 54-->48   - BNP 3572   - RVP/covid pending   - continue respiratory treatments   - echo pending   - Pulmonary consulted     #LE blister   - patient was receiving home care dressing changes   - continue local wound care   - wound care instructions from last hospitalization: Clean with NS (clean in circular motions with NS and gauze). Pat dry. Apply liquid barrier film to periwound skin, allow to dry. Place non adherent silicone layer dressing (Adaptic touch) over bases of wound, cover with Aqu[employer]el [employer] hydrofiber sheet, cover with ABD pad and kerlix, secure with paper tape. Change daily or PRN if soiled.    #Hx of CVA   #HTN  #CHF; chronic   #Afib; chronic   - no deficits noted   - continue statin, metoprolol   - continue eliquis   - continue ASA   - home torsemide 100mg held, IV lasix given currently     #COPD  - no home o2, but patient reports he uses oxygen cans OTC he buys from store   - continue albuterol prn  - continue spiriva, symbicort     #IRINEO 2/2 CKD3   - Cr. 1.6 during admission, around baseline   - continue Farxiga   - trend Cr, f/u bmp    #Hypernatremia   - tolerating diet  - monitor bmp     #DVTppx: on eliquis    [facility] - full code     Patient updated on plan of care with family friend at bedside       IMPROVE VTE Individual Risk Assessment          RISK                                                          Points  [  ] Previous VTE                                                3  [  ] Thrombophilia                                             2  [  ] Lower limb paralysis                                   2        (unable to hold up >15 seconds)    [  ] Current Cancer                                             2         (within 6 months)  [  ] Immobilization > 24 hrs                              1  [  ] ICU/CCU stay > 24 hours                             1  [ x] Age > 60                                                         1    IMPROVE VTE Score: 1; on eliquis        89 year old male with PMH of afib on Eliquis, b/l Le blisters, CHF (EF 45-50%, combined systolic and diastolic), CVA, HTN, COPD, CKD3, multiple hospitalizations for CHF exacerbation in the prior months. Patient also recently had a left sided pleural effusion that was drained last hospitalization. Chest xray performed in the ED displays an enlarged heart with large effusion. Patient to be admitted for CHF exacerbation.     #SOB 2/2 CHF exacerbation  #Hx effusion drainage, multiple admissions  - admit to medicine  - currently on RA saturating 93-95% at rest  - CXR: Heart enlargement and quite large left effusion  - EKG showed afib rate controlled   - IV lasix given in ED   - Troponin negative 54-->48   - BNP 3572   - fluid restriction 1L   - RVP/covid pending   - continue respiratory treatments   - echo pending   - Pulmonary consulted     #LE blister   - patient was receiving home care dressing changes   - continue local wound care   - wound care instructions from last hospitalization: Clean with NS (clean in circular motions with NS and gauze). Pat dry. Apply liquid barrier film to periwound skin, allow to dry. Place non adherent silicone layer dressing (Adaptic touch) over bases of wound, cover with Aquacel AG hydrofiber sheet, cover with ABD pad and kerlix, secure with paper tape. Change daily or PRN if soiled.    #Hx of CVA   #HTN  #CHF; chronic   #Afib; chronic   - no deficits noted   - continue statin, metoprolol   - continue eliquis   - continue ASA   - home torsemide 100mg held, IV lasix given currently     #COPD  - no home o2, but patient reports he uses oxygen cans OTC he buys from store   - continue albuterol prn  - continue spiriva, symbicort     #IRINEO 2/2 CKD3   - Cr. 1.6 during admission, around baseline   - continue Farxiga   - renal diet   - trend Cr, f/u bmp    #Hypernatremia   - monitor bmp     #DVTppx: on eliquis    [facility] - full code     Patient updated on plan of care with family friend at bedside       IMPROVE VTE Individual Risk Assessment          RISK                                                          Points  [  ] Previous VTE                                                3  [  ] Thrombophilia                                             2  [  ] Lower limb paralysis                                   2        (unable to hold up >15 seconds)    [  ] Current Cancer                                             2         (within 6 months)  [  ] Immobilization > 24 hrs                              1  [  ] ICU/CCU stay > 24 hours                             1  [ x] Age > 60                                                         1    IMPROVE VTE Score: 1; on eliquis        89 year old male with PMH of afib on Eliquis, b/l Le blisters, CHF (EF 45-50%, combined systolic and diastolic), CVA, HTN, COPD, CKD3, multiple hospitalizations for CHF exacerbation in the prior months. Patient also recently had a left sided pleural effusion that was drained last hospitalization. Chest xray performed in the ED displays an enlarged heart with large effusion. Patient to be admitted for CHF exacerbation.     #SOB 2/2 CHF exacerbation  #Hx effusion drainage, multiple admissions  - admit to medicine  - currently on RA saturating 93-95% at rest  - CXR: Heart enlargement and quite large left effusion  - EKG showed afib rate controlled   - IV lasix given in ED, continue lasix IVP   - Troponin negative 54-->48   - BNP 3572   - fluid restriction 1L   - RVP/covid pending   - continue respiratory treatments   - echo pending   - Pulmonary consulted     #LE blister   - patient was receiving home care dressing changes   - continue local wound care   - wound care instructions from last hospitalization: Clean with NS (clean in circular motions with NS and gauze). Pat dry. Apply liquid barrier film to periwound skin, allow to dry. Place non adherent silicone layer dressing (Adaptic touch) over bases of wound, cover with Aquacel AG hydrofiber sheet, cover with ABD pad and kerlix, secure with paper tape. Change daily or PRN if soiled.    #Hx of CVA   #HTN  #CHF; chronic   #Afib; chronic   - no deficits noted   - continue statin, metoprolol   - continue eliquis   - continue ASA   - home torsemide 100mg held, IV lasix given currently     #COPD  - no home o2, but patient reports he uses oxygen cans OTC he buys from store   - continue albuterol prn  - continue spiriva, symbicort     #IRINEO 2/2 CKD3   - Cr. 1.6 during admission, around baseline   - continue Farxiga   - renal diet   - trend Cr, f/u bmp    #Hypernatremia   - monitor bmp     #DVTppx: on eliquis    [facility] - full code     Patient updated on plan of care with family friend at bedside       IMPROVE VTE Individual Risk Assessment          RISK                                                          Points  [  ] Previous VTE                                                3  [  ] Thrombophilia                                             2  [  ] Lower limb paralysis                                   2        (unable to hold up >15 seconds)    [  ] Current Cancer                                             2         (within 6 months)  [  ] Immobilization > 24 hrs                              1  [  ] ICU/CCU stay > 24 hours                             1  [ x] Age > 60                                                         1    IMPROVE VTE Score: 1; on eliquis        89 year old male with PMH of afib on Eliquis, b/l Le blisters, CHF (EF 45-50%, combined systolic and diastolic), CVA, HTN, COPD, CKD3, multiple hospitalizations for CHF exacerbation in the prior months. Patient also recently had a left sided pleural effusion that was drained last hospitalization. Chest xray performed in the ED displays an enlarged heart with large effusion. Patient to be admitted for CHF exacerbation.     #SOB 2/2 CHF exacerbation  #Hx effusion drainage, multiple admissions  - admit to medicine  - currently on RA saturating 93-95% at rest  - CXR: Heart enlargement and quite large left effusion  - EKG showed afib rate controlled   - IV lasix given in ED, continue lasix IVP   - Troponin negative 54-->48   - BNP 3572   - fluid restriction 1L   - RVP/covid pending   - continue respiratory treatments   - echo pending   - Pulmonary consulted   - Palliative consulted     #LE blister   - patient was receiving home care dressing changes   - continue local wound care   - wound care instructions from last hospitalization: Clean with NS (clean in circular motions with NS and gauze). Pat dry. Apply liquid barrier film to periwound skin, allow to dry. Place non adherent silicone layer dressing (Adaptic touch) over bases of wound, cover with Aquacel AG hydrofiber sheet, cover with ABD pad and kerlix, secure with paper tape. Change daily or PRN if soiled.    #Hx of CVA   #HTN  #CHF; chronic   #Afib; chronic   - no deficits noted   - continue statin, metoprolol   - continue eliquis   - continue ASA   - home torsemide 100mg held, IV lasix given currently     #COPD  - no home o2, but patient reports he uses oxygen cans OTC he buys from store   - continue albuterol prn  - continue spiriva, symbicort     #IRINEO 2/2 CKD3   - Cr. 1.6 during admission, around baseline   - continue Farxiga   - renal diet   - trend Cr, f/u bmp    #Hypernatremia   - monitor bmp     #DVTppx: on eliquis    [facility] - full code     Patient updated on plan of care with family friend at bedside       IMPROVE VTE Individual Risk Assessment          RISK                                                          Points  [  ] Previous VTE                                                3  [  ] Thrombophilia                                             2  [  ] Lower limb paralysis                                   2        (unable to hold up >15 seconds)    [  ] Current Cancer                                             2         (within 6 months)  [  ] Immobilization > 24 hrs                              1  [  ] ICU/CCU stay > 24 hours                             1  [ x] Age > 60                                                         1    IMPROVE VTE Score: 1; on eliquis

## 2024-02-06 NOTE — H&P ADULT - NSHPPHYSICALEXAM_GEN_ALL_CORE
Vital Signs Last 24 Hrs  T(F): 97.3 (06 Feb 2024 15:50), Max: 97.3 (06 Feb 2024 15:50)  HR: 65 (06 Feb 2024 18:05) (62 - 65)  BP: 154/76 (06 Feb 2024 18:05) (154/76 - 166/74)  RR: 16 (06 Feb 2024 18:05) (14 - 16)  SpO2: 95% (06 Feb 2024 18:05) (90% - 95%)    PHYSICAL EXAM:  GENERAL: NAD, well-groomed, well-developed  HEAD:  Atraumatic, Normocephalic  EYES: EOMI, conjunctiva and sclera clear  ENMT: Moist mucous membranes, Good dentition, no thrush  NECK: Supple, No JVD  CHEST/LUNG: diminished to auscultation bilaterally, good air entry, non-labored breathing, no respiratory distress  HEART: RRR; S1/S2, No murmur  ABDOMEN: Soft, Nontender, Nondistended; Bowel sounds present  VASCULAR: Normal pulses, Normal capillary refill  EXTREMITIES: No calf tenderness, No cyanosis, edema 1+, healing blister noted on anterior portion of left lower leg   LYMPH: Normal; No lymphadenopathy noted  SKIN: Warm, Intact  PSYCH: Normal mood, Normal affect  NERVOUS SYSTEM:  A/O x3, Good concentration; CN 2-12 intact, No focal deficits

## 2024-02-07 NOTE — CONSULT NOTE ADULT - ASSESSMENT
Hold A/C   Plan for joe mcdonnell.  Physical Examination:  GENERAL:               Alert, Oriented, No acute distress.    HEENT:                   No JVD, Moist MM  PULM:                     Bilateral air entry, diminished to ausciltion on left , no significant sputum production, No Rales, No Rhonchi, No Wheezing  CVS:                         S1, S2,  +murmur  ABD:                        Soft, nondistended, nontender, normoactive bowel sounds,   EXT:                         +edema, nontender, No Cyanosis or Clubbing   Vascular:                Warm Extremities,   SKIN:                       Warm and well perfused, no rashes noted.   NEURO:                  Alert, oriented, interactive, nonfocal, follows commands  PSYC:                      Calm, + Insight.         Assessment  Dyspnea suspect due to acute on chronic Effusion Left  underlying Diastolic CHF possible  Chronic Left pleural effusions  s/p thoracentesis 11/24/23- Exudate     Afib on a/c  Cigar smoker, at risk for copd.   Worsening renal function on CKD    Plan  pleural effusion recurrent  unsure if primary cause of SOB, but as fluid was previously exudative with h/o pipe smoking and patient off a/c      offered thoracentesis and patient agreeable - risks benefits discussed and he states will consent.      As on a/c will need to hold and plan for sathya.      previous cytology was negative.   restart a/c after procedure  n/c o2 to maintain sat    d/w pt and bedside team

## 2024-02-07 NOTE — CONSULT NOTE ADULT - SUBJECTIVE AND OBJECTIVE BOX
HPI:  89 year old male with PMH of afib on Eliquis, b/l Le blisters, CHF (EF 45-50%, combined systolic and diastolic), CVA, HTN, COPD, CKD3, multiple hospitalizations for CHF exacerbation in the prior months. Patient also recently had a left sided pleural effusion that was drained last hospitalization. Patient presents to [place] ED today with shortness of breath and left sided chest pressure which has been progressing over the past week. Patient states that he has been compliant with medications, and no recent illness. Patient denies radiating pain from chest. Chest pain intermittent as per patient. Patient stated that he has noticed minor leg swelling. Patient endorses lethargy. Patient denies nausea, vomiting, and or cough. Patient uses OTC oxygen cans at home as needed. Chest xray performed in the ED displays an enlarged heart with large effusion. Patient to be admitted to medical service for CHF exacerbation.  (06 Feb 2024 17:53)    PERTINENT PM/SXH:   Afib    Congestive heart failure (CHF)    CVA (cerebral vascular accident)    Hypertension, unspecified type    Chronic obstructive pulmonary disease (COPD)      No significant past surgical history      FAMILY HISTORY:    Family Hx substance abuse [ ]yes [ ]no  ITEMS NOT CHECKED ARE NOT PRESENT    SOCIAL HISTORY:   Significant other/partner[ ]  Children[ X]  Scientologist/Spirituality: [place] [employer]  Substance hx:  [ ]   Tobacco hx:  [ X]   Alcohol hx: [ ]   Living Situation:  Home       ADVANCE DIRECTIVES:    DNR/MOLST- no -FULL CODE  DECISION MAKER(s):   Health Care Proxy(s)    - Salena Love   Name(s): Phone Number(s): [phone]    BASELINE (I)ADL(s) (prior to admission):  [place]:  Moderate      Allergies  No Known Allergies    Intolerances    MEDICATIONS  (STANDING):  allopurinol 100 milliGRAM(s) Oral daily  apixaban 2.5 milliGRAM(s) Oral every 12 hours  aspirin enteric coated 81 milliGRAM(s) Oral daily  atorvastatin 40 milliGRAM(s) Oral at bedtime  budesonide 160 MICROgram(s)/formoterol 4.5 MICROgram(s) Inhaler 2 Puff(s) Inhalation two times a day  furosemide   Injectable 40 milliGRAM(s) IV Push two times a day  metoprolol succinate ER 50 milliGRAM(s) Oral daily  pantoprazole    Tablet 40 milliGRAM(s) Oral before breakfast  petrolatum white Ointment 1 Application(s) Topical three times a day  polyethylene glycol 3350 17 Gram(s) Oral two times a day  potassium chloride    Tablet ER 40 milliEquivalent(s) Oral every 4 hours  tiotropium 2.5 MICROgram(s) Inhaler 2 Puff(s) Inhalation daily  triamcinolone 0.1% Oral Paste 1 Application(s) Topical every 12 hours    MEDICATIONS  (PRN):  acetaminophen     Tablet .. 650 milliGRAM(s) Oral every 6 hours PRN Mild Pain (1 - 3)  albuterol    90 MICROgram(s) HFA Inhaler 2 Puff(s) Inhalation every 6 hours PRN Shortness of Breath and/or Wheezing  aluminum hydroxide/magnesium hydroxide/simethicone Suspension 30 milliLiter(s) Oral every 4 hours PRN Dyspepsia  melatonin 3 milliGRAM(s) Oral at bedtime PRN Insomnia  ondansetron Injectable 4 milliGRAM(s) IV Push every 8 hours PRN Nausea and/or Vomiting    PRESENT SYMPTOMS: [ ]Unable to self-report  [ ] CPOT [ ] PAINADs [ ] RDOS  Source if other than patient:  [ ]Family   [ ]Team     Pain:  no  QOL impact -   Location -                    Aggravating factors -  Quality -  Radiation -  Timing-  Severity (0-10 scale):  Minimal acceptable level (0-10 scale):       Dyspnea:                           [ ]Mild [ ]Moderate [ ]Severe  Anxiety:                             [ ]Mild [ ]Moderate [ ]Severe  Depressed:  Fatigue:                             [ ]Mild [ ]Moderate [ ]Severe  Nausea:                             [ ]Mild [ ]Moderate [ ]Severe  Loss of appetite:              [ ]Mild [ ]Moderate [ ]Severe  Constipation:                    [ ]Mild [ ]Moderate [ ]Severe      Other Symptoms:  [ ]All other review of systems negative       Chaplaincy Referral: [ ] yes [ ] refused [ ] following [ ] Deferred   Palliative Performance Status Version 2:         %    http://npcrc.org/files/news/palliative_performance_scale_ppsv2.pdf    PHYSICAL EXAM:  Vital Signs Last 24 Hrs  T(C): 36.7 (07 Feb 2024 04:40), Max: 36.7 (07 Feb 2024 04:40)  T(F): 98.1 (07 Feb 2024 04:40), Max: 98.1 (07 Feb 2024 04:40)  HR: 65 (07 Feb 2024 06:14) (60 - 77)  BP: 149/82 (07 Feb 2024 06:14) (144/69 - 176/73)  BP(mean): --  RR: 16 (07 Feb 2024 06:14) (14 - 18)  SpO2: 99% (07 Feb 2024 06:14) (90% - 99%)    Parameters below as of 07 Feb 2024 06:14  Patient On (Oxygen Delivery Method): nasal cannula  O2 Flow (L/min): 2   I&O's Summary    GENERAL: [ ]Cachexia    [ ]Alert  [ ]Oriented x   [ ]Lethargic  [ ]Unarousable  [ ]Verbal  [ ]Non-Verbal  Behavioral:   [ ] Anxiety  [ ] Delirium [ ] Agitation [ ] Other  HEENT:  [ ]Normal   [ ]Dry mouth   [ ]ET Tube/Trach  [ ]Oral lesions  PULMONARY:   [ ]Clear [ ]Tachypnea  [ ]Audible excessive secretions   [ ]Rhonchi        [ ]Right [ ]Left [ ]Bilateral  [ ]Crackles        [ ]Right [ ]Left [ ]Bilateral  [ ]Wheezing     [ ]Right [ ]Left [ ]Bilateral  [ ]Diminished breath sounds [ ]right [ ]left [ ]bilateral  CARDIOVASCULAR:    [ ]Regular [ ]Irregular [ ]Tachy  [ ]Abdirashid [ ]Murmur [ ]Other  GASTROINTESTINAL:  [ ]Soft  [ ]Distended   [ ]+BS  [ ]Non tender [ ]Tender  [ ]Other [ ]PEG [ ]OGT/ NGT  Last BM:  GENITOURINARY:  [ ]Normal [ ] Incontinent   [ ]Oliguria/Anuria   [ ]Aguiar  MUSCULOSKELETAL:   [ ]Normal   [ ]Weakness  [ ]Bed/Wheelchair bound [ ]Edema  NEUROLOGIC:   [ ]No focal deficits  [ ]Cognitive impairment  [ ]Dysphagia [ ]Dysarthria [ ]Paresis [ ]Other   SKIN:   [ ]Normal  [ ]Rash  [ ]Other  [ ]Pressure ulcer(s)       Present on admission [ ]y [ ]n    CRITICAL CARE:  [ ] Shock Present  [ ]Septic [ ]Cardiogenic [ ]Neurologic [ ]Hypovolemic  [ ]  Vasopressors [ ]  Inotropes   [ ]Respiratory failure present [ ]Mechanical ventilation [ ]Non-invasive ventilatory support [ ]High flow    [ ]Acute  [ ]Chronic [ ]Hypoxic  [ ]Hypercarbic [ ]Other  [ ]Other organ failure     LABS:                        10.6   7.30  )-----------( 171      ( 07 Feb 2024 06:49 )             33.9   02-07    148<H>  |  101  |  43<H>  ----------------------------<  93  2.9<LL>   |  38<H>  |  1.50<H>    Ca    9.0      07 Feb 2024 06:49    TPro  7.5  /  Alb  2.6<L>  /  TBili  0.7  /  DBili  x   /  AST  17  /  ALT  10  /  AlkPhos  68  02-07  PT/INR - ( 07 Feb 2024 06:49 )   PT: 17.7 sec;   INR: 1.57 ratio             Urinalysis Basic - ( 07 Feb 2024 06:49 )    Color: x / Appearance: x / SG: x / pH: x  Gluc: 93 mg/dL / Ketone: x  / Bili: x / Urobili: x   Blood: x / Protein: x / Nitrite: x   Leuk Esterase: x / RBC: x / WBC x   Sq Epi: x / Non Sq Epi: x / Bacteria: x      RADIOLOGY & ADDITIONAL STUDIES:    PROTEIN CALORIE MALNUTRITION PRESENT: [ ]mild [ ]moderate [ ]severe [ ]underweight [ ]morbid obesity  https://www.andeal.org/vault/2440/web/files/ONC/Table_Clinical%20Characteristics%20to%20Document%20Malnutrition-White%20JV%20et%20al%202012.pdf    Height (cm): 167.6 (02-06-24 @ 22:00), 167.6 (12-18-23 @ 09:52), 167.6 (11-22-23 @ 17:35)  Weight (kg): 70.3 (02-06-24 @ 22:00), 74.6 (01-04-24 @ 08:00), 70.3 (12-18-23 @ 09:52)  BMI (kg/m2): 25 (02-06-24 @ 22:00), 26.6 (01-04-24 @ 08:00), 25 (12-18-23 @ 09:52)    [ ]PPSV2 < or = to 30% [ ]significant weight loss  [ ]poor nutritional intake  [ ]anasarca[ ]Artificial Nutrition      Other REFERRALS:  [ ]Hospice  [ ]Child Life  [ ]Social Work  [ ]Case management [ ]Holistic Therapy  HPI:  89 year old male with PMH of afib on Eliquis, b/l Le blisters, CHF (EF 45-50%, combined systolic and diastolic), CVA, HTN, COPD, CKD3, multiple hospitalizations for CHF exacerbation in the prior months. Patient also recently had a left sided pleural effusion that was drained last hospitalization. Patient presents to [place] ED today with shortness of breath and left sided chest pressure which has been progressing over the past week. Patient states that he has been compliant with medications, and no recent illness. Patient denies radiating pain from chest. Chest pain intermittent as per patient. Patient stated that he has noticed minor leg swelling. Patient endorses lethargy. Patient denies nausea, vomiting, and or cough. Patient uses OTC oxygen cans at home as needed. Chest xray performed in the ED displays an enlarged heart with large effusion. Patient to be admitted to medical service for CHF exacerbation.  (06 Feb 2024 17:53)  Palliative care team c/s for readmission hx for CHF. Pt seen and examined at bedside this morning. He denied any pain and stated he actually felt better than yesterday.    PERTINENT PM/SXH:   Afib    Congestive heart failure (CHF)    CVA (cerebral vascular accident)    Hypertension, unspecified type    Chronic obstructive pulmonary disease (COPD)      No significant past surgical history      FAMILY HISTORY:    Family Hx substance abuse [ ]yes [ ]no  ITEMS NOT CHECKED ARE NOT PRESENT    SOCIAL HISTORY:   Significant other/partner[ ]  Children[ X]  Hoahaoism/Spirituality: [employer]  Substance hx:  [ ]   Tobacco hx:  [ X]   Alcohol hx: [ ]   Living Situation:  Home       ADVANCE DIRECTIVES:    DNR/MOLST- no -FULL CODE  DECISION MAKER(s):   Health Care Proxy(s)    - Antonietta Love   Name(s): Phone Number(s): [phone]    BASELINE (I)ADL(s) (prior to admission):  [employer]:  Moderate      Allergies  No Known Allergies    Intolerances    MEDICATIONS  (STANDING):  allopurinol 100 milliGRAM(s) Oral daily  apixaban 2.5 milliGRAM(s) Oral every 12 hours  aspirin enteric coated 81 milliGRAM(s) Oral daily  atorvastatin 40 milliGRAM(s) Oral at bedtime  budesonide 160 MICROgram(s)/formoterol 4.5 MICROgram(s) Inhaler 2 Puff(s) Inhalation two times a day  furosemide   Injectable 40 milliGRAM(s) IV Push two times a day  metoprolol succinate ER 50 milliGRAM(s) Oral daily  pantoprazole    Tablet 40 milliGRAM(s) Oral before breakfast  petrolatum white Ointment 1 Application(s) Topical three times a day  polyethylene glycol 3350 17 Gram(s) Oral two times a day  potassium chloride    Tablet ER 40 milliEquivalent(s) Oral every 4 hours  tiotropium 2.5 MICROgram(s) Inhaler 2 Puff(s) Inhalation daily  triamcinolone 0.1% Oral Paste 1 Application(s) Topical every 12 hours    MEDICATIONS  (PRN):  acetaminophen     Tablet .. 650 milliGRAM(s) Oral every 6 hours PRN Mild Pain (1 - 3)  albuterol    90 MICROgram(s) HFA Inhaler 2 Puff(s) Inhalation every 6 hours PRN Shortness of Breath and/or Wheezing  aluminum hydroxide/magnesium hydroxide/simethicone Suspension 30 milliLiter(s) Oral every 4 hours PRN Dyspepsia  melatonin 3 milliGRAM(s) Oral at bedtime PRN Insomnia  ondansetron Injectable 4 milliGRAM(s) IV Push every 8 hours PRN Nausea and/or Vomiting    PRESENT SYMPTOMS:     Pain:  no  QOL impact -   Location -                    Aggravating factors -  Quality -  Radiation -  Timing-  Severity (0-10 scale):  Minimal acceptable level (0-10 scale):       Dyspnea:                         Mild   Anxiety:                            does not report  Depressed:                   does not report  Fatigue:                             none  Nausea:                          none  Loss of appetite:             mild  Constipation:                  none      Other Symptoms:  [X ]All other review of systems negative       Chaplaincy Referral:   Deferred   Palliative Performance Status Version 2:      50   %    http://npcrc.org/files/news/palliative_performance_scale_ppsv2.pdf    PHYSICAL EXAM:  Vital Signs Last 24 Hrs  T(C): 36.7 (07 Feb 2024 04:40), Max: 36.7 (07 Feb 2024 04:40)  T(F): 98.1 (07 Feb 2024 04:40), Max: 98.1 (07 Feb 2024 04:40)  HR: 65 (07 Feb 2024 06:14) (60 - 77)  BP: 149/82 (07 Feb 2024 06:14) (144/69 - 176/73)  BP(mean): --  RR: 16 (07 Feb 2024 06:14) (14 - 18)  SpO2: 99% (07 Feb 2024 06:14) (90% - 99%)    Parameters below as of 07 Feb 2024 06:14  Patient On (Oxygen Delivery Method): nasal cannula  O2 Flow (L/min): 2   I&O's Summary    GENERAL: sitting in bed eating breakfast in NAD  HEENT: NC/AT, MMM  PULMONARY: decreased breath sounds to L side, no wheezing  CARDIOVASCULAR:  RRR, no murmur  GASTROINTESTINAL: soft, nontender  Last BM: 2/6  MUSCULOSKELETAL: moves all extremities, no deformities noted  NEUROLOGIC: A&Ox3, appropriate affect  SKIN: see nursing assessment for further details      LABS:                        10.6   7.30  )-----------( 171      ( 07 Feb 2024 06:49 )             33.9   02-07    148<H>  |  101  |  43<H>  ----------------------------<  93  2.9<LL>   |  38<H>  |  1.50<H>    Ca    9.0      07 Feb 2024 06:49    TPro  7.5  /  Alb  2.6<L>  /  TBili  0.7  /  DBili  x   /  AST  17  /  ALT  10  /  AlkPhos  68  02-07  PT/INR - ( 07 Feb 2024 06:49 )   PT: 17.7 sec;   INR: 1.57 ratio             Urinalysis Basic - ( 07 Feb 2024 06:49 )    Color: x / Appearance: x / SG: x / pH: x  Gluc: 93 mg/dL / Ketone: x  / Bili: x / Urobili: x   Blood: x / Protein: x / Nitrite: x   Leuk Esterase: x / RBC: x / WBC x   Sq Epi: x / Non Sq Epi: x / Bacteria: x      RADIOLOGY & ADDITIONAL STUDIES:    PROCEDURE DATE:  02/06/2024          INTERPRETATION:  AP chest on February 6, 2024 at 4:32 PM. Patient is   short of breath.    Patient is scheduled for admission.    Heart quite enlarged.    There is a very large left effusion may be minimally improved compared to   January 2.    IMPRESSION: Heart enlargement and quite large left effusion.    --- End of Report ---      PROTEIN CALORIE MALNUTRITION PRESENT: [ ]mild [ ]moderate [ ]severe [ ]underweight [ ]morbid obesity  https://www.andeal.org/vault/2440/web/files/ONC/Table_Clinical%20Characteristics%20to%20Document%20Malnutrition-White%20JV%20et%20al%202012.pdf    Height (cm): 167.6 (02-06-24 @ 22:00), 167.6 (12-18-23 @ 09:52), 167.6 (11-22-23 @ 17:35)  Weight (kg): 70.3 (02-06-24 @ 22:00), 74.6 (01-04-24 @ 08:00), 70.3 (12-18-23 @ 09:52)  BMI (kg/m2): 25 (02-06-24 @ 22:00), 26.6 (01-04-24 @ 08:00), 25 (12-18-23 @ 09:52)    [ ]PPSV2 < or = to 30% [ ]significant weight loss  [ ]poor nutritional intake  [ ]anasarca[ ]Artificial Nutrition      Other REFERRALS:  [ ]Hospice  [ ]Child Life  [ ]Social Work  [ ]Case management [ ]Holistic Therapy

## 2024-02-07 NOTE — CONSULT NOTE ADULT - ASSESSMENT
89 year old male with PMH of afib on Eliquis, CHF (EF 45-50%, combined systolic and diastolic), CVA, HTN, COPD, CKD3, multiple hospitalizations for CHF exacerbation in the prior months. Patient also recently had a left sided pleural effusion that was drained last hospitalization.  CXR on this admission w/ large effusion. Patient to be admitted for CHF exacerbation. Palliative care c/s for recurrent admissions.    Dyspnea 2/2 acute on chronic CHF exacerbation  - pt with recurrent Large left pleural effusion; pt sent to [facility] last month for pigtail cath placement evaluation - pt improved with diuresis and IR did not place  - Cardiology following, echo ordered and reviewed  - Pulm following, tentative plan for thoracentesis tomorrow    Advanced care planning  - HCP: Antonietta Love  - Pt is FULL CODE    Encounter for Palliative Care  - Introduced the palliative care team to pt at bedside and spoke with Antonietta Love with pt's permission. Antonietta remembered me from last admission. Discussed the pt's risk of deconditioning and decline in functional status with recurrent admissions. I expressed my concerns that the pt will clinically decline with recurrent hospitalizations and Antonietta expressed her understanding of this. She would like to speak with pulmonology and determine if pt can receive thoras outpatient. We also discussed the pt's code status and she expressed her concerns with compressions/intubation. I discussed with her that these aggressive measures will cause more harm than benefit for Jaylon and she agreed. She plans to come today to talk to the pt in person about GOC. Supportive care given.  - Will continue to follow for ongoing GOC conversations and supportive care.

## 2024-02-07 NOTE — DIETITIAN INITIAL EVALUATION ADULT - PERTINENT LABORATORY DATA
02-07    148<H>  |  101  |  43<H>  ----------------------------<  93  2.9<LL>   |  38<H>  |  1.50<H>    Ca    9.0      07 Feb 2024 06:49    TPro  7.5  /  Alb  2.6<L>  /  TBili  0.7  /  DBili  x   /  AST  17  /  ALT  10  /  AlkPhos  68  02-07  A1C with Estimated Average Glucose Result: 5.8 % (01-03-24 @ 06:46)  A1C with Estimated Average Glucose Result: 5.8 % (09-20-23 @ 09:53)  A1C with Estimated Average Glucose Result: 5.8 % (09-19-23 @ 07:28)

## 2024-02-07 NOTE — PROGRESS NOTE ADULT - NS ATTEND AMEND GEN_ALL_CORE FT
patient known to me from last admission     Hypervolemic on exam. continue IV diuresis. patient reports compliance however can be unreliable at times  Cardio, pulm following  plan for thoracentesis tomorrow     Palliative eval noted

## 2024-02-07 NOTE — CONSULT NOTE ADULT - SUBJECTIVE AND OBJECTIVE BOX
RAD SINGLETON  [id number]      HPI:  89 year old male with PMH of afib on Eliquis, b/l Le blisters, CHF (EF 45-50%, combined systolic and diastolic), CVA, HTN, COPD, CKD3, multiple hospitalizations for CHF exacerbation in the prior months. Patient also recently had a left sided pleural effusion that was drained last hospitalization. Patient presents to [place] ED today with shortness of breath and left sided chest pressure which has been progressing over the past week. Patient states that he has been compliant with medications, and no recent illness. Patient denies radiating pain from chest. Chest pain intermittent as per patient. Patient stated that he has noticed minor leg swelling. Patient endorses lethargy. Patient denies nausea, vomiting, and or cough. Patient uses OTC oxygen cans at home as needed. Chest xray performed in the ED displays an enlarged heart with large effusion. Patient to be admitted to medical service for CHF exacerbation.  (06 Feb 2024 17:53)        ALLERGIES:  No Known Allergies      PAST MEDICAL & SURGICAL HISTORY:  Afib      Congestive heart failure (CHF)      CVA (cerebral vascular accident)      Hypertension, unspecified type      Chronic obstructive pulmonary disease (COPD)      No significant past surgical history            CURRENT MEDICATIONS:  MEDICATIONS  (STANDING):  allopurinol 100 milliGRAM(s) Oral daily  apixaban 2.5 milliGRAM(s) Oral every 12 hours  aspirin enteric coated 81 milliGRAM(s) Oral daily  atorvastatin 40 milliGRAM(s) Oral at bedtime  budesonide 160 MICROgram(s)/formoterol 4.5 MICROgram(s) Inhaler 2 Puff(s) Inhalation two times a day  furosemide   Injectable 40 milliGRAM(s) IV Push two times a day  metoprolol succinate ER 50 milliGRAM(s) Oral daily  pantoprazole    Tablet 40 milliGRAM(s) Oral before breakfast  petrolatum white Ointment 1 Application(s) Topical three times a day  polyethylene glycol 3350 17 Gram(s) Oral two times a day  potassium chloride    Tablet ER 40 milliEquivalent(s) Oral every 4 hours  tiotropium 2.5 MICROgram(s) Inhaler 2 Puff(s) Inhalation daily  triamcinolone 0.1% Oral Paste 1 Application(s) Topical every 12 hours    MEDICATIONS  (PRN):  acetaminophen     Tablet .. 650 milliGRAM(s) Oral every 6 hours PRN Mild Pain (1 - 3)  albuterol    90 MICROgram(s) HFA Inhaler 2 Puff(s) Inhalation every 6 hours PRN Shortness of Breath and/or Wheezing  aluminum hydroxide/magnesium hydroxide/simethicone Suspension 30 milliLiter(s) Oral every 4 hours PRN Dyspepsia  melatonin 3 milliGRAM(s) Oral at bedtime PRN Insomnia  ondansetron Injectable 4 milliGRAM(s) IV Push every 8 hours PRN Nausea and/or Vomiting        ROS:  All 10 systems reviewed and positives noted in HPI    OBJECTIVE:    VITAL SIGNS:  Vital Signs Last 24 Hrs  T(C): 36.7 (07 Feb 2024 04:40), Max: 36.7 (07 Feb 2024 04:40)  T(F): 98.1 (07 Feb 2024 04:40), Max: 98.1 (07 Feb 2024 04:40)  HR: 65 (07 Feb 2024 06:14) (60 - 77)  BP: 149/82 (07 Feb 2024 06:14) (144/69 - 176/73)  BP(mean): --  RR: 16 (07 Feb 2024 06:14) (14 - 18)  SpO2: 99% (07 Feb 2024 06:14) (90% - 99%)    Parameters below as of 07 Feb 2024 06:14  Patient On (Oxygen Delivery Method): nasal cannula  O2 Flow (L/min): 2      PHYSICAL EXAM:  General: well appearing, no distress  HEENT: sclera anicteric  Neck: supple, no carotid bruits b/l  CVS: JVP ~ 7 cm H20, irregularly irregular, no murmurs/rubs/gallops  Chest: unlabored respirations, diminished sounds left  Abdomen: non-distended  Extremities: +2 b/l l/e edema  Neuro: awake, alert & oriented x 3  Psych: normal affect      LABS:                        10.6   7.30  )-----------( 171      ( 07 Feb 2024 06:49 )             33.9     02-07    148<H>  |  101  |  43<H>  ----------------------------<  93  2.9<LL>   |  38<H>  |  1.50<H>    Ca    9.0      07 Feb 2024 06:49    TPro  7.5  /  Alb  2.6<L>  /  TBili  0.7  /  DBili  x   /  AST  17  /  ALT  10  /  AlkPhos  68  02-07        PT/INR - ( 07 Feb 2024 06:49 )   PT: 17.7 sec;   INR: 1.57 ratio          ECG: Afib      TTE: < from: TTE Echo Limited or F/U (12.19.23 @ 08:05) >   1. The heart rhythm is irregular throughout the study.   2. Normal global left ventricular systolic function.   3. Left ventricular ejection fraction, by visual estimation, is 50 to   55%.   4. Mildly increased LV wall thickness.   5. Normal left ventricular internal cavity size.   6. The right ventricle is not well visualized, appears generally normal   in size.   7. Left atrial enlargement.   8. Right atrial enlargement.   9. Mild mitral annular calcification.  10. Moderate thickening and calcification of the anterior and posterior   mitral valve leaflets.  11. Moderate mitral valve stenosis (mean gradient 8 mmHg at HR 69 BPM,   MVA 1.3 cm^2 by  msec).  12. Mild mitral valve regurgitation.  13. Mild-moderate tricuspid regurgitation.  14. Aortic valve leaflet calcification. No aortic valve stenosis.  15. Sclerotic aortic valve with normal opening.  16. Mild aortic regurgitation.  17. Estimated pulmonary artery systolic pressure is 38.0 mmHg assuming a   right atrial pressure of 15 mmHg, which is consistent with borderline   pulmonary hypertension.  18. Moderate pleural effusion in both left and right lateral regions.  19. There is no evidence of pericardial effusion.    < from: Xray Chest 1 View- PORTABLE-Urgent (02.06.24 @ 16:50) >  Heart quite enlarged.  There is a very large left effusion may be minimally improved compared to   January 2.            RAD SINGLETON  [id number]      HPI:  89 year old male with PMH of afib on Eliquis, b/l Le blisters, CHF (EF 45-50%, combined systolic and diastolic), CVA, HTN, COPD, CKD3, multiple hospitalizations for CHF exacerbation in the prior months. Patient also recently had a left sided pleural effusion that was drained last hospitalization. Patient presents to [facility] ED today with shortness of breath and left sided chest pressure which has been progressing over the past week. Patient states that he has been compliant with medications, and no recent illness. Patient denies radiating pain from chest. Chest pain intermittent as per patient. Patient stated that he has noticed minor leg swelling. Patient endorses lethargy. Patient denies nausea, vomiting, and or cough. Patient uses OTC oxygen cans at home as needed. Chest xray performed in the ED displays an enlarged heart with large effusion. Patient to be admitted to medical service for CHF exacerbation.  (06 Feb 2024 17:53)        ALLERGIES:  No Known Allergies      PAST MEDICAL & SURGICAL HISTORY:  Afib      Congestive heart failure (CHF)      CVA (cerebral vascular accident)      Hypertension, unspecified type      Chronic obstructive pulmonary disease (COPD)      No significant past surgical history            CURRENT MEDICATIONS:  MEDICATIONS  (STANDING):  allopurinol 100 milliGRAM(s) Oral daily  apixaban 2.5 milliGRAM(s) Oral every 12 hours  aspirin enteric coated 81 milliGRAM(s) Oral daily  atorvastatin 40 milliGRAM(s) Oral at bedtime  budesonide 160 MICROgram(s)/formoterol 4.5 MICROgram(s) Inhaler 2 Puff(s) Inhalation two times a day  furosemide   Injectable 40 milliGRAM(s) IV Push two times a day  metoprolol succinate ER 50 milliGRAM(s) Oral daily  pantoprazole    Tablet 40 milliGRAM(s) Oral before breakfast  petrolatum white Ointment 1 Application(s) Topical three times a day  polyethylene glycol 3350 17 Gram(s) Oral two times a day  potassium chloride    Tablet ER 40 milliEquivalent(s) Oral every 4 hours  tiotropium 2.5 MICROgram(s) Inhaler 2 Puff(s) Inhalation daily  triamcinolone 0.1% Oral Paste 1 Application(s) Topical every 12 hours    MEDICATIONS  (PRN):  acetaminophen     Tablet .. 650 milliGRAM(s) Oral every 6 hours PRN Mild Pain (1 - 3)  albuterol    90 MICROgram(s) HFA Inhaler 2 Puff(s) Inhalation every 6 hours PRN Shortness of Breath and/or Wheezing  aluminum hydroxide/magnesium hydroxide/simethicone Suspension 30 milliLiter(s) Oral every 4 hours PRN Dyspepsia  melatonin 3 milliGRAM(s) Oral at bedtime PRN Insomnia  ondansetron Injectable 4 milliGRAM(s) IV Push every 8 hours PRN Nausea and/or Vomiting        ROS:  All 10 systems reviewed and positives noted in HPI    OBJECTIVE:    VITAL SIGNS:  Vital Signs Last 24 Hrs  T(C): 36.7 (07 Feb 2024 04:40), Max: 36.7 (07 Feb 2024 04:40)  T(F): 98.1 (07 Feb 2024 04:40), Max: 98.1 (07 Feb 2024 04:40)  HR: 65 (07 Feb 2024 06:14) (60 - 77)  BP: 149/82 (07 Feb 2024 06:14) (144/69 - 176/73)  BP(mean): --  RR: 16 (07 Feb 2024 06:14) (14 - 18)  SpO2: 99% (07 Feb 2024 06:14) (90% - 99%)    Parameters below as of 07 Feb 2024 06:14  Patient On (Oxygen Delivery Method): nasal cannula  O2 Flow (L/min): 2      PHYSICAL EXAM:  General: well appearing, no distress  HEENT: sclera anicteric  Neck: supple, no carotid bruits b/l  CVS: JVP ~ 7 cm H20, irregularly irregular, no murmurs/rubs/gallops  Chest: unlabored respirations, diminished sounds left  Abdomen: non-distended  Extremities: +2 b/l l/e edema  Neuro: awake, alert & oriented x 3  Psych: normal affect      LABS:                        10.6   7.30  )-----------( 171      ( 07 Feb 2024 06:49 )             33.9     02-07    148<H>  |  101  |  43<H>  ----------------------------<  93  2.9<LL>   |  38<H>  |  1.50<H>    Ca    9.0      07 Feb 2024 06:49    TPro  7.5  /  Alb  2.6<L>  /  TBili  0.7  /  DBili  x   /  AST  17  /  ALT  10  /  AlkPhos  68  02-07        PT/INR - ( 07 Feb 2024 06:49 )   PT: 17.7 sec;   INR: 1.57 ratio          ECG: Afib      TTE: < from: TTE Echo Limited or F/U (12.19.23 @ 08:05) >   1. The heart rhythm is irregular throughout the study.   2. Normal global left ventricular systolic function.   3. Left ventricular ejection fraction, by visual estimation, is 50 to   55%.   4. Mildly increased LV wall thickness.   5. Normal left ventricular internal cavity size.   6. The right ventricle is not well visualized, appears generally normal   in size.   7. Left atrial enlargement.   8. Right atrial enlargement.   9. Mild mitral annular calcification.  10. Moderate thickening and calcification of the anterior and posterior   mitral valve leaflets.  11. Moderate mitral valve stenosis (mean gradient 8 mmHg at HR 69 BPM,   MVA 1.3 cm^2 by  msec).  12. Mild mitral valve regurgitation.  13. Mild-moderate tricuspid regurgitation.  14. Aortic valve leaflet calcification. No aortic valve stenosis.  15. Sclerotic aortic valve with normal opening.  16. Mild aortic regurgitation.  17. Estimated pulmonary artery systolic pressure is 38.0 mmHg assuming a   right atrial pressure of 15 mmHg, which is consistent with borderline   pulmonary hypertension.  18. Moderate pleural effusion in both left and right lateral regions.  19. There is no evidence of pericardial effusion.      < from: Xray Chest 1 View- PORTABLE-Urgent (02.06.24 @ 16:50) >  Heart quite enlarged.  There is a very large left effusion may be minimally improved compared to   January 2.            RAD SINGLETON  [id number]      HPI:  89 year old male with PMH of Atrial fibrillation on Eliquis, Peripheral arterial disease, Cardiomyopathy, CVA, HTN, COPD, CKD3, multiple hospitalizations for CHF exacerbation in the prior months presents with shortness of breath. Patient also recently had a left sided pleural effusion that was drained last hospitalization. Patient presents to [facility] ED today with shortness of breath and left sided chest pressure which has been progressing over the past week. Patient states that he has been compliant with medications, and no recent illness.     ALLERGIES:  No Known Allergies    CURRENT MEDICATIONS:  MEDICATIONS  (STANDING):  allopurinol 100 milliGRAM(s) Oral daily  apixaban 2.5 milliGRAM(s) Oral every 12 hours  aspirin enteric coated 81 milliGRAM(s) Oral daily  atorvastatin 40 milliGRAM(s) Oral at bedtime  budesonide 160 MICROgram(s)/formoterol 4.5 MICROgram(s) Inhaler 2 Puff(s) Inhalation two times a day  furosemide   Injectable 40 milliGRAM(s) IV Push two times a day  metoprolol succinate ER 50 milliGRAM(s) Oral daily  pantoprazole    Tablet 40 milliGRAM(s) Oral before breakfast  petrolatum white Ointment 1 Application(s) Topical three times a day  polyethylene glycol 3350 17 Gram(s) Oral two times a day  potassium chloride    Tablet ER 40 milliEquivalent(s) Oral every 4 hours  tiotropium 2.5 MICROgram(s) Inhaler 2 Puff(s) Inhalation daily  triamcinolone 0.1% Oral Paste 1 Application(s) Topical every 12 hours    ROS:  All 10 systems reviewed and positives noted in HPI    OBJECTIVE:    VITAL SIGNS:  Vital Signs Last 24 Hrs  T(C): 36.7 (07 Feb 2024 04:40), Max: 36.7 (07 Feb 2024 04:40)  T(F): 98.1 (07 Feb 2024 04:40), Max: 98.1 (07 Feb 2024 04:40)  HR: 65 (07 Feb 2024 06:14) (60 - 77)  BP: 149/82 (07 Feb 2024 06:14) (144/69 - 176/73)  BP(mean): --  RR: 16 (07 Feb 2024 06:14) (14 - 18)  SpO2: 99% (07 Feb 2024 06:14) (90% - 99%)    Parameters below as of 07 Feb 2024 06:14  Patient On (Oxygen Delivery Method): nasal cannula  O2 Flow (L/min): 2      PHYSICAL EXAM:  General: well appearing, no distress  HEENT: sclera anicteric  Neck: supple  CVS: JVP ~ 7 cm H20, irregularly irregular  Chest: unlabored respirations, diminished sounds left  Abdomen: non-distended  Extremities: +2 b/l l/e edema  Neuro: awake, alert & oriented x 3  Psych: normal affect      LABS:                        10.6   7.30  )-----------( 171      ( 07 Feb 2024 06:49 )             33.9     02-07    148<H>  |  101  |  43<H>  ----------------------------<  93  2.9<LL>   |  38<H>  |  1.50<H>    Ca    9.0      07 Feb 2024 06:49    TPro  7.5  /  Alb  2.6<L>  /  TBili  0.7  /  DBili  x   /  AST  17  /  ALT  10  /  AlkPhos  68  02-07        PT/INR - ( 07 Feb 2024 06:49 )   PT: 17.7 sec;   INR: 1.57 ratio        < from: Xray Chest 1 View- PORTABLE-Urgent (02.06.24 @ 16:50) >  Heart quite enlarged.  There is a very large left effusion may be minimally improved compared to January 2.

## 2024-02-07 NOTE — DIETITIAN INITIAL EVALUATION ADULT - NS FNS DIET ORDER
Diet, Renal Restrictions:   For patients receiving Renal Replacement - No Protein Restr, No Conc K, No Conc Phos, Low Sodium  1000mL Fluid Restriction ([id number]) (02-06-24 @ 18:44)

## 2024-02-07 NOTE — DIETITIAN INITIAL EVALUATION ADULT - PERTINENT MEDS FT
MEDICATIONS  (STANDING):  allopurinol 100 milliGRAM(s) Oral daily  apixaban 2.5 milliGRAM(s) Oral every 12 hours  aspirin enteric coated 81 milliGRAM(s) Oral daily  atorvastatin 40 milliGRAM(s) Oral at bedtime  budesonide 160 MICROgram(s)/formoterol 4.5 MICROgram(s) Inhaler 2 Puff(s) Inhalation two times a day  furosemide   Injectable 40 milliGRAM(s) IV Push two times a day  metoprolol succinate ER 50 milliGRAM(s) Oral daily  pantoprazole    Tablet 40 milliGRAM(s) Oral before breakfast  petrolatum white Ointment 1 Application(s) Topical three times a day  polyethylene glycol 3350 17 Gram(s) Oral two times a day  potassium chloride    Tablet ER 40 milliEquivalent(s) Oral every 4 hours  tiotropium 2.5 MICROgram(s) Inhaler 2 Puff(s) Inhalation daily  triamcinolone 0.1% Oral Paste 1 Application(s) Topical every 12 hours    MEDICATIONS  (PRN):  acetaminophen     Tablet .. 650 milliGRAM(s) Oral every 6 hours PRN Mild Pain (1 - 3)  albuterol    90 MICROgram(s) HFA Inhaler 2 Puff(s) Inhalation every 6 hours PRN Shortness of Breath and/or Wheezing  aluminum hydroxide/magnesium hydroxide/simethicone Suspension 30 milliLiter(s) Oral every 4 hours PRN Dyspepsia  melatonin 3 milliGRAM(s) Oral at bedtime PRN Insomnia  ondansetron Injectable 4 milliGRAM(s) IV Push every 8 hours PRN Nausea and/or Vomiting

## 2024-02-07 NOTE — PROGRESS NOTE ADULT - ASSESSMENT
89 year old male with PMH of afib on Eliquis, b/l Le blisters, CHF (EF 45-50%, combined systolic and diastolic), CVA, HTN, COPD, CKD3, multiple hospitalizations for CHF exacerbation in the prior months. Patient also recently had a left sided pleural effusion that was drained last hospitalization. Chest xray performed in the ED displays an enlarged heart with large effusion. Patient to be admitted for CHF exacerbation.     #SOB 2/2 CHF exacerbation  #Hx effusion drainage, multiple admissions  - currently on RA saturating 93-95% at rest  - CXR: Heart enlargement and quite large left effusion  - EKG showed afib rate controlled   - IV lasix given in ED, continue lasix IVP   - Troponin negative 54-->48   - BNP 3572   - fluid restriction 1L   - RVP/covid pending   - continue respiratory treatments   - echo pending   - Pulmonary consulted   - Palliative consulted     #LE blister   - patient was receiving home care dressing changes   - continue local wound care   - wound care instructions from last hospitalization: Clean with NS (clean in circular motions with NS and gauze). Pat dry. Apply liquid barrier film to periwound skin, allow to dry. Place non adherent silicone layer dressing (Adaptic touch) over bases of wound, cover with Aquacel AG hydrofiber sheet, cover with ABD pad and kerlix, secure with paper tape. Change daily or PRN if soiled.    #Hx of CVA   #HTN  #CHF; chronic   #Afib; chronic   - no deficits noted   - continue statin, metoprolol   - continue eliquis   - continue ASA   - home torsemide 100mg held, IV lasix given currently     #COPD  - no home o2, but patient reports he uses oxygen cans OTC he buys from store   - continue albuterol prn  - continue spiriva, symbicort     #IRINEO 2/2 CKD3   - Cr. 1.6 during admission, around baseline   - continue Farxiga   - renal diet   - trend Cr, f/u bmp    #Hypernatremia   - monitor bmp     #DVTppx: on eliquis    [facility] - full code     Patient updated on plan of care with family friend at bedside       IMPROVE VTE Individual Risk Assessment          RISK                                                          Points  [  ] Previous VTE                                                3  [  ] Thrombophilia                                             2  [  ] Lower limb paralysis                                   2        (unable to hold up >15 seconds)    [  ] Current Cancer                                             2         (within 6 months)  [  ] Immobilization > 24 hrs                              1  [  ] ICU/CCU stay > 24 hours                             1  [ x] Age > 60                                                         1    IMPROVE VTE Score: 1; on eliquis        89 year old male with PMH of afib on Eliquis, CHF (EF 45-50%, combined systolic and diastolic), CVA, HTN, COPD, CKD3, multiple hospitalizations for CHF exacerbation in the prior months. Patient also recently had a left sided pleural effusion that was drained last hospitalization.  CXR on this admission w/ large effusion. Patient to be admitted for CHF exacerbation.     #SOB 2/2 CHF exacerbation  #Large left pleural effusion  - pt with multiple admissions recently and with drainage of pleural effusions  - on supplemental O2; 2L nc and sats 98%  - CXR: Heart enlargement and quite large left effusion  - EKG showed afib rate controlled   - IV lasix given in ED, continue lasix 40 mg. IV daily  - Troponin negative 54-->48   - BNP 3572   - fluid restriction 1L   - Echo pending; CArdiology following  - Farxiga held while on IV lasix  - Pulmonary consulted   - Palliative consulted     #left lower ext blister   - patient was receiving home care dressing changes-- cont local wound care   - wound care instructions from last hospitalization: Clean with NS (clean in circular motions with NS and gauze). Pat dry. Apply liquid barrier film to periwound skin, allow to dry. Place non adherent silicone layer dressing (Adaptic touch) over bases of wound, cover with Aquacel AG hydrofiber sheet, cover with ABD pad and kerlix, secure with paper tape. Change daily or PRN if soiled.    #Hx of CVA   #HTN  #CHF; chronic   #Afib; chronic   - no deficits noted   - continue statin, metoprolol   - continue eliquis   - continue ASA   - home torsemide 100mg held, IV lasix given currently     #COPD  - no home o2, but patient reports he uses oxygen cans OTC he buys from store   - continue albuterol prn  - continue spiriva, symbicort     #IRINEO 2/2 CKD3   - Cr. 1.6 during admission, around baseline   - trend Creat  -avoid nephrotoxins    #Hypernatremia   - monitor bmp     #DVT ppx: on eliquis    [facility] - full code     Dispo:  Palliative pending, anticipate pt will be here for 48-72 hrs., will update family on plan of care

## 2024-02-07 NOTE — PROGRESS NOTE ADULT - SUBJECTIVE AND OBJECTIVE BOX
Patient is a 89y old  Male who presents with a chief complaint of Shortness of breath (06 Feb 2024 17:53)      Patient seen and examined at bedside. No overnight events reported.     ALLERGIES:  No Known Allergies    MEDICATIONS  (STANDING):  allopurinol 100 milliGRAM(s) Oral daily  apixaban 2.5 milliGRAM(s) Oral every 12 hours  aspirin enteric coated 81 milliGRAM(s) Oral daily  atorvastatin 40 milliGRAM(s) Oral at bedtime  budesonide 160 MICROgram(s)/formoterol 4.5 MICROgram(s) Inhaler 2 Puff(s) Inhalation two times a day  dapagliflozin 10 milliGRAM(s) Oral every 24 hours  furosemide   Injectable 40 milliGRAM(s) IV Push daily  metoprolol succinate ER 50 milliGRAM(s) Oral daily  pantoprazole    Tablet 40 milliGRAM(s) Oral before breakfast  petrolatum white Ointment 1 Application(s) Topical three times a day  polyethylene glycol 3350 17 Gram(s) Oral two times a day  tiotropium 2.5 MICROgram(s) Inhaler 2 Puff(s) Inhalation daily  triamcinolone 0.1% Oral Paste 1 Application(s) Topical every 12 hours    MEDICATIONS  (PRN):  acetaminophen     Tablet .. 650 milliGRAM(s) Oral every 6 hours PRN Mild Pain (1 - 3)  albuterol    90 MICROgram(s) HFA Inhaler 2 Puff(s) Inhalation every 6 hours PRN Shortness of Breath and/or Wheezing  aluminum hydroxide/magnesium hydroxide/simethicone Suspension 30 milliLiter(s) Oral every 4 hours PRN Dyspepsia  melatonin 3 milliGRAM(s) Oral at bedtime PRN Insomnia  ondansetron Injectable 4 milliGRAM(s) IV Push every 8 hours PRN Nausea and/or Vomiting    Vital Signs Last 24 Hrs  T(F): 98.1 (07 Feb 2024 04:40), Max: 98.1 (07 Feb 2024 04:40)  HR: 65 (07 Feb 2024 06:14) (60 - 77)  BP: 149/82 (07 Feb 2024 06:14) (144/69 - 176/73)  RR: 16 (07 Feb 2024 06:14) (14 - 18)  SpO2: 99% (07 Feb 2024 06:14) (90% - 99%)  I&O's Summary    PHYSICAL EXAM:  General: NAD, A/O x 3  ENT: No gross hearing impairment, Moist mucous membranes, no thrush  Neck: Supple, No JVD  Lungs: Clear to auscultation bilaterally, good air entry, non-labored breathing  Cardio: RRR, S1/S2, No murmur  Abdomen: Soft, Nontender, Nondistended; Bowel sounds present  Extremities: No calf tenderness, No cyanosis, No pitting edema  Psych: Appropriate mood and affect    LABS:                        10.6   7.30  )-----------( 171      ( 07 Feb 2024 06:49 )             33.9     02-06    147  |  102  |  44  ----------------------------<  110  3.5   |  36  |  1.66    Ca    9.3      06 Feb 2024 16:22    TPro  8.2  /  Alb  2.9  /  TBili  0.7  /  DBili  x   /  AST  22  /  ALT  11  /  AlkPhos  73  02-06                CARDIAC MARKERS ( 06 Feb 2024 17:20 )  x     / 48.3 ng/L / x     / x     / x      CARDIAC MARKERS ( 06 Feb 2024 16:22 )  x     / 54.3 ng/L / x     / x     / x          01-03 Chol 91 mg/dL LDL -- HDL 51 mg/dL Trig 16 mg/dL                  Urinalysis Basic - ( 06 Feb 2024 16:22 )    Color: x / Appearance: x / SG: x / pH: x  Gluc: 110 mg/dL / Ketone: x  / Bili: x / Urobili: x   Blood: x / Protein: x / Nitrite: x   Leuk Esterase: x / RBC: x / WBC x   Sq Epi: x / Non Sq Epi: x / Bacteria: x          RADIOLOGY & ADDITIONAL TESTS:  < from: Xray Chest 1 View- PORTABLE-Urgent (02.06.24 @ 16:50) >      IMPRESSION: Heart enlargement and quite large left effusion.    < end of copied text >    Care Discussed with Consultants/Other Providers:    Patient is a 89y old  Male who presents with a chief complaint of Shortness of breath (06 Feb 2024 17:53)      Patient seen and examined at bedside. No overnight events reported.  Pt states he has shortness of breath.     ALLERGIES:  No Known Allergies    MEDICATIONS  (STANDING):  allopurinol 100 milliGRAM(s) Oral daily  apixaban 2.5 milliGRAM(s) Oral every 12 hours  aspirin enteric coated 81 milliGRAM(s) Oral daily  atorvastatin 40 milliGRAM(s) Oral at bedtime  budesonide 160 MICROgram(s)/formoterol 4.5 MICROgram(s) Inhaler 2 Puff(s) Inhalation two times a day  dapagliflozin 10 milliGRAM(s) Oral every 24 hours  furosemide   Injectable 40 milliGRAM(s) IV Push daily  metoprolol succinate ER 50 milliGRAM(s) Oral daily  pantoprazole    Tablet 40 milliGRAM(s) Oral before breakfast  petrolatum white Ointment 1 Application(s) Topical three times a day  polyethylene glycol 3350 17 Gram(s) Oral two times a day  tiotropium 2.5 MICROgram(s) Inhaler 2 Puff(s) Inhalation daily  triamcinolone 0.1% Oral Paste 1 Application(s) Topical every 12 hours    MEDICATIONS  (PRN):  acetaminophen     Tablet .. 650 milliGRAM(s) Oral every 6 hours PRN Mild Pain (1 - 3)  albuterol    90 MICROgram(s) HFA Inhaler 2 Puff(s) Inhalation every 6 hours PRN Shortness of Breath and/or Wheezing  aluminum hydroxide/magnesium hydroxide/simethicone Suspension 30 milliLiter(s) Oral every 4 hours PRN Dyspepsia  melatonin 3 milliGRAM(s) Oral at bedtime PRN Insomnia  ondansetron Injectable 4 milliGRAM(s) IV Push every 8 hours PRN Nausea and/or Vomiting    Vital Signs Last 24 Hrs  T(F): 98.1 (07 Feb 2024 04:40), Max: 98.1 (07 Feb 2024 04:40)  HR: 65 (07 Feb 2024 06:14) (60 - 77)  BP: 149/82 (07 Feb 2024 06:14) (144/69 - 176/73)  RR: 16 (07 Feb 2024 06:14) (14 - 18)  SpO2: 99% (07 Feb 2024 06:14) (90% - 99%)  I&O's Summary    PHYSICAL EXAM:  General: NAD, Alert but sleepy this am.  ENT: No gross hearing impairment, Moist mucous membranes, no thrush  Neck: Supple, No JVD  Lungs:  non-labored breathing, decreased BS b/l, Left>right  Cardio: irreg, No murmur  Abdomen: Soft, Nontender, Nondistended; Bowel sounds present  Extremities: left lower ext with +blister, No calf tenderness, No cyanosis, 1+ pitting edema at lower exts  Psych: Appropriate mood and affect    LABS:                        10.6   7.30  )-----------( 171      ( 07 Feb 2024 06:49 )             33.9     02-06    147  |  102  |  44  ----------------------------<  110  3.5   |  36  |  1.66    Ca    9.3      06 Feb 2024 16:22    TPro  8.2  /  Alb  2.9  /  TBili  0.7  /  DBili  x   /  AST  22  /  ALT  11  /  AlkPhos  73  02-06                CARDIAC MARKERS ( 06 Feb 2024 17:20 )  x     / 48.3 ng/L / x     / x     / x      CARDIAC MARKERS ( 06 Feb 2024 16:22 )  x     / 54.3 ng/L / x     / x     / x          01-03 Chol 91 mg/dL LDL -- HDL 51 mg/dL Trig 16 mg/dL                  Urinalysis Basic - ( 06 Feb 2024 16:22 )    Color: x / Appearance: x / SG: x / pH: x  Gluc: 110 mg/dL / Ketone: x  / Bili: x / Urobili: x   Blood: x / Protein: x / Nitrite: x   Leuk Esterase: x / RBC: x / WBC x   Sq Epi: x / Non Sq Epi: x / Bacteria: x          RADIOLOGY & ADDITIONAL TESTS:  < from: Xray Chest 1 View- PORTABLE-Urgent (02.06.24 @ 16:50) >      IMPRESSION: Heart enlargement and quite large left effusion.    < end of copied text >    Care Discussed with Consultants/Other Providers:    Patient is a 89y old  Male who presents with a chief complaint of Shortness of breath (06 Feb 2024 17:53)    Patient seen and examined at bedside. No overnight events reported.  Pt states he has shortness of breath.     ALLERGIES:  No Known Allergies    MEDICATIONS  (STANDING):  allopurinol 100 milliGRAM(s) Oral daily  apixaban 2.5 milliGRAM(s) Oral every 12 hours  aspirin enteric coated 81 milliGRAM(s) Oral daily  atorvastatin 40 milliGRAM(s) Oral at bedtime  budesonide 160 MICROgram(s)/formoterol 4.5 MICROgram(s) Inhaler 2 Puff(s) Inhalation two times a day  dapagliflozin 10 milliGRAM(s) Oral every 24 hours  furosemide   Injectable 40 milliGRAM(s) IV Push daily  metoprolol succinate ER 50 milliGRAM(s) Oral daily  pantoprazole    Tablet 40 milliGRAM(s) Oral before breakfast  petrolatum white Ointment 1 Application(s) Topical three times a day  polyethylene glycol 3350 17 Gram(s) Oral two times a day  tiotropium 2.5 MICROgram(s) Inhaler 2 Puff(s) Inhalation daily  triamcinolone 0.1% Oral Paste 1 Application(s) Topical every 12 hours    MEDICATIONS  (PRN):  acetaminophen     Tablet .. 650 milliGRAM(s) Oral every 6 hours PRN Mild Pain (1 - 3)  albuterol    90 MICROgram(s) HFA Inhaler 2 Puff(s) Inhalation every 6 hours PRN Shortness of Breath and/or Wheezing  aluminum hydroxide/magnesium hydroxide/simethicone Suspension 30 milliLiter(s) Oral every 4 hours PRN Dyspepsia  melatonin 3 milliGRAM(s) Oral at bedtime PRN Insomnia  ondansetron Injectable 4 milliGRAM(s) IV Push every 8 hours PRN Nausea and/or Vomiting    Vital Signs Last 24 Hrs  T(F): 98.1 (07 Feb 2024 04:40), Max: 98.1 (07 Feb 2024 04:40)  HR: 65 (07 Feb 2024 06:14) (60 - 77)  BP: 149/82 (07 Feb 2024 06:14) (144/69 - 176/73)  RR: 16 (07 Feb 2024 06:14) (14 - 18)  SpO2: 99% (07 Feb 2024 06:14) (90% - 99%)  I&O's Summary    PHYSICAL EXAM:  General: NAD, Alert but sleepy this am.  ENT: No gross hearing impairment, Moist mucous membranes, no thrush  Neck: Supple, No JVD  Lungs:  non-labored breathing, decreased BS b/l, Left>right  Cardio: irreg, No murmur  Abdomen: Soft, Nontender, Nondistended; Bowel sounds present  Extremities: left lower ext with +blister, No calf tenderness, No cyanosis, 1+ pitting edema at lower exts  Psych: Appropriate mood and affect    LABS:                        10.6   7.30  )-----------( 171      ( 07 Feb 2024 06:49 )             33.9     02-06    147  |  102  |  44  ----------------------------<  110  3.5   |  36  |  1.66    Ca    9.3      06 Feb 2024 16:22    TPro  8.2  /  Alb  2.9  /  TBili  0.7  /  DBili  x   /  AST  22  /  ALT  11  /  AlkPhos  73  02-06                CARDIAC MARKERS ( 06 Feb 2024 17:20 )  x     / 48.3 ng/L / x     / x     / x      CARDIAC MARKERS ( 06 Feb 2024 16:22 )  x     / 54.3 ng/L / x     / x     / x          01-03 Chol 91 mg/dL LDL -- HDL 51 mg/dL Trig 16 mg/dL                  Urinalysis Basic - ( 06 Feb 2024 16:22 )    Color: x / Appearance: x / SG: x / pH: x  Gluc: 110 mg/dL / Ketone: x  / Bili: x / Urobili: x   Blood: x / Protein: x / Nitrite: x   Leuk Esterase: x / RBC: x / WBC x   Sq Epi: x / Non Sq Epi: x / Bacteria: x          RADIOLOGY & ADDITIONAL TESTS:  < from: Xray Chest 1 View- PORTABLE-Urgent (02.06.24 @ 16:50) >      IMPRESSION: Heart enlargement and quite large left effusion.    < end of copied text >    Care Discussed with Consultants/Other Providers:

## 2024-02-07 NOTE — DIETITIAN INITIAL EVALUATION ADULT - OTHER INFO
89 year old male with PMH of afib on Eliquis, b/l Le blisters, CHF (EF 45-50%, combined systolic and diastolic), CVA, HTN, COPD, CKD3, multiple hospitalizations for CHF exacerbation in the prior months, patient admitted with CHF , appetite noted fair consumed ~ 75% of breakfast meal this morning , no BM since PTA , unknown last BM , (L) leg blister , (+1) (L) leg edema, Lasix therapy noted repleting potassium. NFPE conducted patient with evidence of moderate protein calore malnutrition , hx of CKD , suggest addition of Nepro QD (420kcals, 19gms protein ) ,1000 ml FR noted .

## 2024-02-07 NOTE — CONSULT NOTE ADULT - ASSESSMENT
Assessment: 89 year old male with PMH of afib on Eliquis, b/l Le blisters, CHF (EF 45-50%, combined systolic and diastolic), CVA, HTN, COPD, CKD3, multiple hospitalizations for CHF exacerbation in the prior months c/o left sided chest pain and sob. pt currently reports improvement in breathing. states he is compliant with his medications and has an aide administer them everyday. cardiology consulted for chf exacerbation    Recommendations  #Heart Failure  -Pat uses O2 at home as needed, currently on room air  -CXR showed large left pleural effusion  -Continuous cardiac monitoring to r/o arrhythmias.  - continue iv diuresis, monitor electrolytes and kidney function, goal k>4, mg>2. if renal function improves, may consider initiating gdmt, ace/arb/arni, farxiga, continue metop and eliquis at this time  -Daily weight monitoring, Strict I/O, Low sodium DASH diet  -Check ECHO to evaluate LV/RV function and valvular abnormalities  - consider pulm consult for left effusion    Carlotta CONNELLY-BC  Assessment: 89 year old male with PMH of Atrial fibrillation on Eliquis, Peripheral arterial disease, Cardiomyopathy, CVA, HTN, COPD, CKD3, multiple hospitalizations for CHF exacerbation in the prior months presents with shortness of breath. Patient states he is compliant with his medications and has an aide administer them everyday. Cardiology consulted for CHF exacerbation    Recommendations  #Heart Failure  -Patient uses O2 at home as needed, currently on room air  -CXR showed large left pleural effusion  -Continuous cardiac monitoring to r/o arrhythmias  -Continue iv diuresis, monitor electrolytes and kidney function, goal k>4, mg>2. if renal function improves, may consider initiating gdmt, ace/arb/arni, farxiga, continue metoprolol and eliquis at this time  -Daily weight monitoring, Strict I/O, Low sodium DASH diet  -Check ECHO to evaluate LV/RV function and valvular abnormalities  - consider pulm consult for left effusion    Carlotta CONNELLY-BC

## 2024-02-07 NOTE — CONSULT NOTE ADULT - SUBJECTIVE AND OBJECTIVE BOX
PULMONARY CONSULT  Location of Patient :  3EST E334 D1 ( 3EST)  Attending requesting Consult:Peter Gilliland  Chief Complaint :     Reason For consult :      Initial HPI on admission:  HPI:  89 year old male with PMH of afib on Eliquis, b/l Le blisters, CHF (EF 45-50%, combined systolic and diastolic), CVA, HTN, COPD, CKD3, multiple hospitalizations for CHF exacerbation in the prior months. Patient also recently had a left sided pleural effusion that was drained last hospitalization. Patient presents to [facility] ED today with shortness of breath and left sided chest pressure which has been progressing over the past week. Patient states that he has been compliant with medications, and no recent illness. Patient denies radiating pain from chest. Chest pain intermittent as per patient. Patient stated that he has noticed minor leg swelling. Patient endorses lethargy. Patient denies nausea, vomiting, and or cough. Patient uses OTC oxygen cans at home as needed. Chest xray performed in the ED displays an enlarged heart with large effusion. Patient to be admitted to medical service for CHF exacerbation.  (06 Feb 2024 17:53)      BRIEF HOSPITAL COURSE: ***    PAST MEDICAL & SURGICAL HISTORY:  Afib      Congestive heart failure (CHF)      CVA (cerebral vascular accident)      Hypertension, unspecified type      Chronic obstructive pulmonary disease (COPD)      No significant past surgical history        Allergies    No Known Allergies    Intolerances      FAMILY HISTORY:    Social history: Social History:  Patient lives at home with his ex wife, daughter, and son in law. Patient ambulates independently, no longer works. (06 Feb 2024 17:53)       Smoking:     Drinking:     Drug use:    Review of Systems: as stated above    CONSTITUTIONAL: No fever, No chills, No fatigue  EYES: No eye pain, No visual disturbances, No discharge  ENMT:  No difficulty hearing, No tinnitus, No vertigo; No sinus or throat pain  NECK: No pain, No stiffness  RESPIRATORY: No Cough, No SOB, No Secretions  CARDIOVASCULAR: No chest pain, No palpitations, No dizziness, or No leg swelling  GASTROINTESTINAL: No abdominal or epigastric pain. No nausea, No vomiting, No hematemesis; No diarrhea, No constipation. No melena, No hematochezia.  GENITOURINARY: No dysuria, No frequency, No hematuria, No incontinence  NEUROLOGICAL: No headaches, No memory loss, No loss of strength, No numbness, No tremors  SKIN: No itching, No burning, No rashes, No lesions   MUSCULOSKELETAL: No joint pain or swelling; No muscle, back, No extremity pain  PSYCHIATRIC: No depression, No anxiety, No mood swings, No difficulty sleeping      Medications:  MEDICATIONS  (STANDING):  allopurinol 100 milliGRAM(s) Oral daily  apixaban 2.5 milliGRAM(s) Oral every 12 hours  aspirin enteric coated 81 milliGRAM(s) Oral daily  atorvastatin 40 milliGRAM(s) Oral at bedtime  budesonide 160 MICROgram(s)/formoterol 4.5 MICROgram(s) Inhaler 2 Puff(s) Inhalation two times a day  furosemide   Injectable 40 milliGRAM(s) IV Push two times a day  metoprolol succinate ER 50 milliGRAM(s) Oral daily  pantoprazole    Tablet 40 milliGRAM(s) Oral before breakfast  petrolatum white Ointment 1 Application(s) Topical three times a day  polyethylene glycol 3350 17 Gram(s) Oral two times a day  potassium chloride    Tablet ER 40 milliEquivalent(s) Oral every 4 hours  tiotropium 2.5 MICROgram(s) Inhaler 2 Puff(s) Inhalation daily  triamcinolone 0.1% Oral Paste 1 Application(s) Topical every 12 hours    MEDICATIONS  (PRN):  acetaminophen     Tablet .. 650 milliGRAM(s) Oral every 6 hours PRN Mild Pain (1 - 3)  albuterol    90 MICROgram(s) HFA Inhaler 2 Puff(s) Inhalation every 6 hours PRN Shortness of Breath and/or Wheezing  aluminum hydroxide/magnesium hydroxide/simethicone Suspension 30 milliLiter(s) Oral every 4 hours PRN Dyspepsia  melatonin 3 milliGRAM(s) Oral at bedtime PRN Insomnia  ondansetron Injectable 4 milliGRAM(s) IV Push every 8 hours PRN Nausea and/or Vomiting      Antibiotics History      Heme Medications   apixaban 2.5 milliGRAM(s) Oral every 12 hours, 02-06-24 @ 18:14  aspirin enteric coated 81 milliGRAM(s) Oral daily, 02-06-24 @ 18:13      GI Medications  aluminum hydroxide/magnesium hydroxide/simethicone Suspension 30 milliLiter(s) Oral every 4 hours, 02-06-24 @ 18:44, Routine PRN  pantoprazole    Tablet 40 milliGRAM(s) Oral before breakfast, 02-06-24 @ 18:17, Routine  polyethylene glycol 3350 17 Gram(s) Oral two times a day, 02-06-24 @ 18:16, Routine        Home Medications:  Last Order Reconciliation Date: 02-06-24 @ 18:17 (Admission Reconciliation)  acetaminophen 325 mg oral tablet: 2 tab(s) orally every 6 hours As needed Temp greater or equal to 38C (100.4F), Mild Pain (1 - 3) (02-06-24 @ 18:12)  albuterol 90 mcg/inh inhalation aerosol: 2 puff(s) inhaled every 6 hours As needed Shortness of Breath and/or Wheezing (02-06-24 @ 18:12)  allopurinol 100 mg oral tablet: 1 tab(s) orally once a day (02-06-24 @ 18:12)  apixaban 2.5 mg oral tablet: 1 tab(s) orally every 12 hours (02-06-24 @ 18:12)  aspirin 81 mg oral delayed release tablet: 1 tab(s) orally once a day (02-06-24 @ 18:12)  atorvastatin 40 mg oral tablet: 1 tab(s) orally once a day (at bedtime) (02-06-24 @ 18:12)  budesonide-formoterol 160 mcg-4.5 mcg/inh inhalation aerosol: 2 puff(s) inhaled 2 times a day (02-06-24 @ 18:12)  Farxiga 10 mg oral tablet: 1 tab(s) orally once a day (02-06-24 @ 18:12)  melatonin 3 mg oral tablet: 1 tab(s) orally once a day (at bedtime) (02-06-24 @ 18:12)  metoprolol succinate 50 mg oral tablet, extended release: 1 tab(s) orally once a day (02-06-24 @ 18:12)  pantoprazole 40 mg oral delayed release tablet: 1 tab(s) orally once a day (before a meal) (02-06-24 @ 18:12)  petrolatum topical ointment: Apply topically to affected area 3 times a day Apply topically to buttocks area and below back of leg three times a day MDD: 3 apps (02-06-24 @ 18:12)  polyethylene glycol 3350 oral powder for reconstitution: 17 gram(s) orally 2 times a day (02-06-24 @ 18:12)  senna leaf extract oral tablet: 2 tab(s) orally once a day (at bedtime) (02-06-24 @ 18:12)  tiotropium 2.5 mcg/inh inhalation aerosol: 2 puff(s) inhaled once a day (02-06-24 @ 18:12)  torsemide 100 mg oral tablet: 1 tab(s) orally once a day (02-06-24 @ 18:12)  triamcinolone 0.1% topical ointment: Apply topically to affected area 3 times a day Apply topically to bumps on legs two times a day MDD: 3 apps (02-06-24 @ 18:12)  triamcinolone 0.1% topical ointment: 1 Apply topically to affected area every 12 hours (02-06-24 @ 18:12)      LABS:                        10.6   7.30  )-----------( 171      ( 07 Feb 2024 06:49 )             33.9     02-07    148<H>  |  101  |  43<H>  ----------------------------<  93  2.9<LL>   |  38<H>  |  1.50<H>    Ca    9.0      07 Feb 2024 06:49    TPro  7.5  /  Alb  2.6<L>  /  TBili  0.7  /  DBili  x   /  AST  17  /  ALT  10  /  AlkPhos  68  02-07            PT/INR - ( 07 Feb 2024 06:49 )   PT: 17.7 sec;   INR: 1.57 ratio           Urinalysis Basic - ( 07 Feb 2024 06:49 )    Color: x / Appearance: x / SG: x / pH: x  Gluc: 93 mg/dL / Ketone: x  / Bili: x / Urobili: x   Blood: x / Protein: x / Nitrite: x   Leuk Esterase: x / RBC: x / WBC x   Sq Epi: x / Non Sq Epi: x / Bacteria: x              CULTURES: (if applicable)    Rapid RVP Result: NotDetec (02-06-24 @ 18:22)        CAPILLARY BLOOD GLUCOSE          RADIOLOGY  CXR:      CT:    ECHO:      VITALS:  T(C): 36.7 (02-07-24 @ 04:40), Max: 36.7 (02-07-24 @ 04:40)  T(F): 98.1 (02-07-24 @ 04:40), Max: 98.1 (02-07-24 @ 04:40)  HR: 65 (02-07-24 @ 06:14) (60 - 77)  BP: 149/82 (02-07-24 @ 06:14) (144/69 - 176/73)  BP(mean): --  ABP: --  ABP(mean): --  RR: 16 (02-07-24 @ 06:14) (14 - 18)  SpO2: 99% (02-07-24 @ 06:14) (90% - 99%)  CVP(mm Hg): --  CVP(cm H2O): --    Ins and Outs       Height (cm): 167.6 (02-06-24 @ 22:00)  Weight (kg): 70.3 (02-06-24 @ 22:00)  BMI (kg/m2): 25 (02-06-24 @ 22:00)        I&O's Detail      Physical Examination:  GENERAL:               Alert, Oriented, No acute distress.    HEENT:                    Pupils equal, reactive to light.  Symmetric. No JVD, Moist MM  PULM:                     Bilateral air entry, Clear to auscultation bilaterally, no significant sputum production, No Rales, No Rhonchi, No Wheezing  CVS:                         S1, S2,  No Murmur  ABD:                        Soft, nondistended, nontender, normoactive bowel sounds,   EXT:                         No edema, nontender, No Cyanosis or Clubbing   Vascular:                Warm Extremities, Normal Capillary refill, Normal Distal Pulses  SKIN:                       Warm and well perfused, no rashes noted.   NEURO:                  Alert, oriented, interactive, nonfocal, follows commands  PSYC:                      Calm, + Insight.     PULMONARY CONSULT  Location of Patient : [facility] D[facility] ([facility])     Initial HPI on admission:  HPI:  89 year old male with PMH of afib on Eliquis, b/l Le blisters, CHF (EF 45-50%, combined systolic and diastolic), CVA, HTN, COPD Chronic - Pipe smoker, CKD3, multiple hospitalizations for CHF exacerbation in the prior months. Patient also recently had a left sided pleural effusion that was drained last hospitalization. Patient presents to [place] ED today with shortness of breath and left sided chest pressure which has been progressing over the past week. Patient states that he has been compliant with medications, and no recent illness. Patient denies radiating pain from chest. Chest pain intermittent as per patient. Patient stated that he has noticed minor leg swelling. Patient endorses lethargy. Patient denies nausea, vomiting, and or cough. Patient uses OTC oxygen cans at home as needed. Chest xray performed in the ED displays an enlarged heart with large effusion. Patient to be admitted to medical service for CHF exacerbation.  (06 Feb 2024 17:53)    patient seen and examined this am  states feeling better  no cough, secretions, sob, palp, fever chills  also complain of LE edema     PAST MEDICAL & SURGICAL HISTORY:  Afib  Congestive heart failure (CHF)  CVA (cerebral vascular accident)  Hypertension, unspecified type  Chronic obstructive pulmonary disease (COPD)  No significant past surgical history        Allergies    No Known Allergies    Intolerances      FAMILY HISTORY:    Social history: Social History:  Patient lives at home with his ex wife, daughter, and son in law. Patient ambulates independently, no longer works. (06 Feb 2024 17:53)       Smoking:     Drinking:     Drug use:    Review of Systems: as stated above    CONSTITUTIONAL: No fever, No chills, +fatigue  EYES: No eye pain, No visual disturbances, No discharge  ENMT:  No difficulty hearing, No tinnitus, No vertigo; No sinus or throat pain  NECK: No pain, No stiffness  RESPIRATORY: No Cough, +SOB, No Secretions  CARDIOVASCULAR: No chest pain, No palpitations, No dizziness, or +leg swelling  GASTROINTESTINAL: No abdominal or epigastric pain. No nausea, No vomiting, No hematemesis; No diarrhea, No constipation. No melena, No hematochezia.  GENITOURINARY: No dysuria, No frequency, No hematuria, No incontinence  NEUROLOGICAL: No headaches, No memory loss, No loss of strength, No numbness, No tremors  SKIN: No itching, No burning, No rashes, No lesions   MUSCULOSKELETAL: No joint pain or swelling; No muscle, back, No extremity pain  PSYCHIATRIC: No depression, No anxiety, No mood swings, No difficulty sleeping      Medications:  MEDICATIONS  (STANDING):  allopurinol 100 milliGRAM(s) Oral daily  apixaban 2.5 milliGRAM(s) Oral every 12 hours  aspirin enteric coated 81 milliGRAM(s) Oral daily  atorvastatin 40 milliGRAM(s) Oral at bedtime  budesonide 160 MICROgram(s)/formoterol 4.5 MICROgram(s) Inhaler 2 Puff(s) Inhalation two times a day  furosemide   Injectable 40 milliGRAM(s) IV Push two times a day  metoprolol succinate ER 50 milliGRAM(s) Oral daily  pantoprazole    Tablet 40 milliGRAM(s) Oral before breakfast  petrolatum white Ointment 1 Application(s) Topical three times a day  polyethylene glycol 3350 17 Gram(s) Oral two times a day  potassium chloride    Tablet ER 40 milliEquivalent(s) Oral every 4 hours  tiotropium 2.5 MICROgram(s) Inhaler 2 Puff(s) Inhalation daily  triamcinolone 0.1% Oral Paste 1 Application(s) Topical every 12 hours    MEDICATIONS  (PRN):  acetaminophen     Tablet .. 650 milliGRAM(s) Oral every 6 hours PRN Mild Pain (1 - 3)  albuterol    90 MICROgram(s) HFA Inhaler 2 Puff(s) Inhalation every 6 hours PRN Shortness of Breath and/or Wheezing  aluminum hydroxide/magnesium hydroxide/simethicone Suspension 30 milliLiter(s) Oral every 4 hours PRN Dyspepsia  melatonin 3 milliGRAM(s) Oral at bedtime PRN Insomnia  ondansetron Injectable 4 milliGRAM(s) IV Push every 8 hours PRN Nausea and/or Vomiting      Antibiotics History      Heme Medications   apixaban 2.5 milliGRAM(s) Oral every 12 hours, 02-06-24 @ 18:14  aspirin enteric coated 81 milliGRAM(s) Oral daily, 02-06-24 @ 18:13      GI Medications  aluminum hydroxide/magnesium hydroxide/simethicone Suspension 30 milliLiter(s) Oral every 4 hours, 02-06-24 @ 18:44, Routine PRN  pantoprazole    Tablet 40 milliGRAM(s) Oral before breakfast, 02-06-24 @ 18:17, Routine  polyethylene glycol 3350 17 Gram(s) Oral two times a day, 02-06-24 @ 18:16, Routine        Home Medications:  Last Order Reconciliation Date: 02-06-24 @ 18:17 (Admission Reconciliation)  acetaminophen 325 mg oral tablet: 2 tab(s) orally every 6 hours As needed Temp greater or equal to 38C (100.4F), Mild Pain (1 - 3) (02-06-24 @ 18:12)  albuterol 90 mcg/inh inhalation aerosol: 2 puff(s) inhaled every 6 hours As needed Shortness of Breath and/or Wheezing (02-06-24 @ 18:12)  allopurinol 100 mg oral tablet: 1 tab(s) orally once a day (02-06-24 @ 18:12)  apixaban 2.5 mg oral tablet: 1 tab(s) orally every 12 hours (02-06-24 @ 18:12)  aspirin 81 mg oral delayed release tablet: 1 tab(s) orally once a day (02-06-24 @ 18:12)  atorvastatin 40 mg oral tablet: 1 tab(s) orally once a day (at bedtime) (02-06-24 @ 18:12)  budesonide-formoterol 160 mcg-4.5 mcg/inh inhalation aerosol: 2 puff(s) inhaled 2 times a day (02-06-24 @ 18:12)  Farxiga 10 mg oral tablet: 1 tab(s) orally once a day (02-06-24 @ 18:12)  melatonin 3 mg oral tablet: 1 tab(s) orally once a day (at bedtime) (02-06-24 @ 18:12)  metoprolol succinate 50 mg oral tablet, extended release: 1 tab(s) orally once a day (02-06-24 @ 18:12)  pantoprazole 40 mg oral delayed release tablet: 1 tab(s) orally once a day (before a meal) (02-06-24 @ 18:12)  petrolatum topical ointment: Apply topically to affected area 3 times a day Apply topically to buttocks area and below back of leg three times a day MDD: 3 apps (02-06-24 @ 18:12)  polyethylene glycol 3350 oral powder for reconstitution: 17 gram(s) orally 2 times a day (02-06-24 @ 18:12)  senna leaf extract oral tablet: 2 tab(s) orally once a day (at bedtime) (02-06-24 @ 18:12)  tiotropium 2.5 mcg/inh inhalation aerosol: 2 puff(s) inhaled once a day (02-06-24 @ 18:12)  torsemide 100 mg oral tablet: 1 tab(s) orally once a day (02-06-24 @ 18:12)  triamcinolone 0.1% topical ointment: Apply topically to affected area 3 times a day Apply topically to bumps on legs two times a day MDD: 3 apps (02-06-24 @ 18:12)  triamcinolone 0.1% topical ointment: 1 Apply topically to affected area every 12 hours (02-06-24 @ 18:12)      LABS:                        10.6   7.30  )-----------( 171      ( 07 Feb 2024 06:49 )             33.9     02-07    148<H>  |  101  |  43<H>  ----------------------------<  93  2.9<LL>   |  38<H>  |  1.50<H>    Ca    9.0      07 Feb 2024 06:49    TPro  7.5  /  Alb  2.6<L>  /  TBili  0.7  /  DBili  x   /  AST  17  /  ALT  10  /  AlkPhos  68  02-07     PT/INR - ( 07 Feb 2024 06:49 )   PT: 17.7 sec;   INR: 1.57 ratio           Urinalysis Basic - ( 07 Feb 2024 06:49 )    Color: x / Appearance: x / SG: x / pH: x  Gluc: 93 mg/dL / Ketone: x  / Bili: x / Urobili: x   Blood: x / Protein: x / Nitrite: x   Leuk Esterase: x / RBC: x / WBC x   Sq Epi: x / Non Sq Epi: x / Bacteria: x           RADIOLOGY  CXR:  < from: Xray Chest 1 View- PORTABLE-Urgent (02.06.24 @ 16:50) >  ACC: [id number] EXAM:  XR CHEST PORTABLE URGENT 1V   ORDERED BY: EL WHALEY     PROCEDURE DATE:  02/06/2024          INTERPRETATION:  AP chest on February 6, 2024 at 4:32 PM. Patient is   short of breath.    Patient is scheduled for admission.    Heart quite enlarged.    There is a very large left effusion may be minimally improved compared to   January 2.    IMPRESSION: Heart enlargement and quite large left effusion.    --- End of Report ---      < end of copied text >         VITALS:  T(C): 36.7 (02-07-24 @ 04:40), Max: 36.7 (02-07-24 @ 04:40)  T(F): 98.1 (02-07-24 @ 04:40), Max: 98.1 (02-07-24 @ 04:40)  HR: 65 (02-07-24 @ 06:14) (60 - 77)  BP: 149/82 (02-07-24 @ 06:14) (144/69 - 176/73)  BP(mean): --  ABP: --  ABP(mean): --  RR: 16 (02-07-24 @ 06:14) (14 - 18)  SpO2: 99% (02-07-24 @ 06:14) (90% - 99%)  CVP(mm Hg): --  CVP(cm H2O): --    Ins and Outs       Height (cm): 167.6 (02-06-24 @ 22:00)  Weight (kg): 70.3 (02-06-24 @ 22:00)  BMI (kg/m2): 25 (02-06-24 @ 22:00)        I&O's Detail

## 2024-02-08 NOTE — PROGRESS NOTE ADULT - SUBJECTIVE AND OBJECTIVE BOX
RAD SINGLETON  [id number]    Chief Complaint: SOB  Interval events: pt seen and examined, labs and chart reviewed. denies chest pain, reports improvement in breathing. AF 60-70s on tele    ALLERGIES:  No Known Allergies    CURRENT MEDICATIONS:  MEDICATIONS  (STANDING):  allopurinol 100 milliGRAM(s) Oral daily  apixaban 2.5 milliGRAM(s) Oral every 12 hours  aspirin enteric coated 81 milliGRAM(s) Oral daily  atorvastatin 40 milliGRAM(s) Oral at bedtime  budesonide 160 MICROgram(s)/formoterol 4.5 MICROgram(s) Inhaler 2 Puff(s) Inhalation two times a day  furosemide   Injectable 40 milliGRAM(s) IV Push two times a day  metoprolol succinate ER 50 milliGRAM(s) Oral daily  pantoprazole    Tablet 40 milliGRAM(s) Oral before breakfast  petrolatum white Ointment 1 Application(s) Topical three times a day  polyethylene glycol 3350 17 Gram(s) Oral two times a day  potassium chloride    Tablet ER 40 milliEquivalent(s) Oral every 4 hours  tiotropium 2.5 MICROgram(s) Inhaler 2 Puff(s) Inhalation daily  triamcinolone 0.1% Oral Paste 1 Application(s) Topical every 12 hours    Social History:  prior pipe use    ROS:  All 10 systems reviewed and positives noted in HPI    OBJECTIVE:    VITAL SIGNS:  Vital Signs Last 24 Hrs  T(C): 36.7 (07 Feb 2024 04:40), Max: 36.7 (07 Feb 2024 04:40)  T(F): 98.1 (07 Feb 2024 04:40), Max: 98.1 (07 Feb 2024 04:40)  HR: 65 (07 Feb 2024 06:14) (60 - 77)  BP: 149/82 (07 Feb 2024 06:14) (144/69 - 176/73)  BP(mean): --  RR: 16 (07 Feb 2024 06:14) (14 - 18)  SpO2: 99% (07 Feb 2024 06:14) (90% - 99%)    Parameters below as of 07 Feb 2024 06:14  Patient On (Oxygen Delivery Method): nasal cannula  O2 Flow (L/min): 2      PHYSICAL EXAM:  General: well appearing, no distress  HEENT: sclera anicteric  Neck: supple  CVS: JVP ~ 7 cm H20, irregularly irregular  Chest: unlabored respirations, diminished sounds left  Abdomen: non-distended  Extremities: +2 b/l l/e edema  Neuro: awake, alert & oriented x 3  Psych: normal affect      LABS:                        10.6   7.30  )-----------( 171      ( 07 Feb 2024 06:49 )             33.9     02-07    148<H>  |  101  |  43<H>  ----------------------------<  93  2.9<LL>   |  38<H>  |  1.50<H>    Ca    9.0      07 Feb 2024 06:49    TPro  7.5  /  Alb  2.6<L>  /  TBili  0.7  /  DBili  x   /  AST  17  /  ALT  10  /  AlkPhos  68  02-07        PT/INR - ( 07 Feb 2024 06:49 )   PT: 17.7 sec;   INR: 1.57 ratio        < from: Xray Chest 1 View- PORTABLE-Urgent (02.06.24 @ 16:50) >  Heart quite enlarged.  There is a very large left effusion may be minimally improved compared to January 2.            RAD SINGLETON  [id number]    Chief Complaint: SOB  Interval events: pt seen and examined, labs and chart reviewed. denies chest pain, reports improvement in breathing. AF 60-70s on tele    ALLERGIES:  No Known Allergies    CURRENT MEDICATIONS:  MEDICATIONS  (STANDING):  allopurinol 100 milliGRAM(s) Oral daily  apixaban 2.5 milliGRAM(s) Oral every 12 hours  aspirin enteric coated 81 milliGRAM(s) Oral daily  atorvastatin 40 milliGRAM(s) Oral at bedtime  budesonide 160 MICROgram(s)/formoterol 4.5 MICROgram(s) Inhaler 2 Puff(s) Inhalation two times a day  furosemide   Injectable 40 milliGRAM(s) IV Push two times a day  metoprolol succinate ER 50 milliGRAM(s) Oral daily  pantoprazole    Tablet 40 milliGRAM(s) Oral before breakfast  petrolatum white Ointment 1 Application(s) Topical three times a day  polyethylene glycol 3350 17 Gram(s) Oral two times a day  potassium chloride    Tablet ER 40 milliEquivalent(s) Oral every 4 hours  tiotropium 2.5 MICROgram(s) Inhaler 2 Puff(s) Inhalation daily  triamcinolone 0.1% Oral Paste 1 Application(s) Topical every 12 hours    Social History:  prior pipe use    ROS:  All 10 systems reviewed and positives noted in HPI    OBJECTIVE:    VITAL SIGNS:  Vital Signs Last 24 Hrs  T(C): 36.7 (07 Feb 2024 04:40), Max: 36.7 (07 Feb 2024 04:40)  T(F): 98.1 (07 Feb 2024 04:40), Max: 98.1 (07 Feb 2024 04:40)  HR: 65 (07 Feb 2024 06:14) (60 - 77)  BP: 149/82 (07 Feb 2024 06:14) (144/69 - 176/73)  BP(mean): --  RR: 16 (07 Feb 2024 06:14) (14 - 18)  SpO2: 99% (07 Feb 2024 06:14) (90% - 99%)    Parameters below as of 07 Feb 2024 06:14  Patient On (Oxygen Delivery Method): nasal cannula  O2 Flow (L/min): 2      PHYSICAL EXAM:  General: well appearing, no distress  HEENT: sclera anicteric  Neck: supple  CVS: JVP ~ 7 cm H20, irregularly irregular  Chest: unlabored respirations, diminished sounds left  Abdomen: non-distended  Extremities: +2 b/l l/e edema  Neuro: awake, alert & oriented x 3  Psych: normal affect      LABS:                        10.6   7.30  )-----------( 171      ( 07 Feb 2024 06:49 )             33.9     02-07    148<H>  |  101  |  43<H>  ----------------------------<  93  2.9<LL>   |  38<H>  |  1.50<H>    Ca    9.0      07 Feb 2024 06:49    TPro  7.5  /  Alb  2.6<L>  /  TBili  0.7  /  DBili  x   /  AST  17  /  ALT  10  /  AlkPhos  68  02-07        PT/INR - ( 07 Feb 2024 06:49 )   PT: 17.7 sec;   INR: 1.57 ratio        < from: Xray Chest 1 View- PORTABLE-Urgent (02.06.24 @ 16:50) >  Heart quite enlarged.  There is a very large left effusion may be minimally improved compared to January 2.      < from: TTE Echo Complete w/o Contrast w/ Doppler (02.07.24 @ 07:49) >   1. Normal global left ventricular systolic function.   2. Left ventricular ejection fraction, by visual estimation, is 55 to   60%.   3. Calculated LVEF by Simpsons Biplane Method 57%.   4. Normal left ventricular internal cavity size.   5. There is mild septal left ventricular hypertrophy.   6. Severely enlarged left atrium.   7. Severely enlarged right atrium.   8. Rheumatic mitral valve.   9. Mild thickening and calcification of the anterior and posterior   mitral valve leaflets.  10. Moderate mitral valve stenosis (peak velocity 1.9m/s, mean gradient    8 mmHg at HR 53 BPM, MVA 1.0 cm^2).  11. Mild mitral valve regurgitation.  12. Mild-moderate tricuspid regurgitation.  13. Aortic sclerosis with borderline decreased opening.  14. Mild aortic regurgitation.  15. Estimated pulmonary artery systolic pressure is 53.9 mmHg assuming a   right atrial pressure of 15 mmHg, which is consistent with moderate   pulmonary hypertension.  16. Large pleural effusion in the left lateral region.

## 2024-02-08 NOTE — PROGRESS NOTE ADULT - SUBJECTIVE AND OBJECTIVE BOX
Patient is a 89y old  Male who presents with a chief complaint of Shortness of breath (07 Feb 2024 12:40)      Patient seen and examined at bedside. No overnight events reported.  He still c/o dyspnea, no chest pain.     ALLERGIES:  No Known Allergies    MEDICATIONS  (STANDING):  allopurinol 100 milliGRAM(s) Oral daily  aspirin enteric coated 81 milliGRAM(s) Oral daily  atorvastatin 40 milliGRAM(s) Oral at bedtime  budesonide 160 MICROgram(s)/formoterol 4.5 MICROgram(s) Inhaler 2 Puff(s) Inhalation two times a day  furosemide   Injectable 40 milliGRAM(s) IV Push two times a day  metoprolol succinate ER 50 milliGRAM(s) Oral daily  pantoprazole    Tablet 40 milliGRAM(s) Oral before breakfast  petrolatum white Ointment 1 Application(s) Topical three times a day  polyethylene glycol 3350 17 Gram(s) Oral two times a day  tiotropium 2.5 MICROgram(s) Inhaler 2 Puff(s) Inhalation daily  triamcinolone 0.1% Oral Paste 1 Application(s) Topical every 12 hours    MEDICATIONS  (PRN):  acetaminophen     Tablet .. 650 milliGRAM(s) Oral every 6 hours PRN Mild Pain (1 - 3)  albuterol    90 MICROgram(s) HFA Inhaler 2 Puff(s) Inhalation every 6 hours PRN Shortness of Breath and/or Wheezing  aluminum hydroxide/magnesium hydroxide/simethicone Suspension 30 milliLiter(s) Oral every 4 hours PRN Dyspepsia  melatonin 3 milliGRAM(s) Oral at bedtime PRN Insomnia  ondansetron Injectable 4 milliGRAM(s) IV Push every 8 hours PRN Nausea and/or Vomiting    Vital Signs Last 24 Hrs  T(F): 97.9 (08 Feb 2024 05:20), Max: 97.9 (08 Feb 2024 05:20)  HR: 68 (08 Feb 2024 05:20) (55 - 68)  BP: 132/78 (08 Feb 2024 05:20) (120/65 - 156/63)  RR: 17 (08 Feb 2024 05:20) (16 - 17)  SpO2: 98% (08 Feb 2024 05:20) (93% - 99%)  I&O's Summary    07 Feb 2024 07:01  -  08 Feb 2024 07:00  --------------------------------------------------------  IN: 200 mL / OUT: 0 mL / NET: 200 mL      PHYSICAL EXAM:  General: NAD, A/O x 3, appears fatigued.   ENT: No gross hearing impairment, Moist mucous membranes, no thrush  Neck: Supple, No JVD  Lungs:  non-labored breathing, decreased breath sounds b/l, L>R  Cardio: irreg, No murmur  Abdomen: Soft, Nontender, Nondistended; Bowel sounds present  Extremities: left lower ext dressed with gauze wrap, 1+ pitting edema b/l, No calf tenderness  Psych: Appropriate mood and affect    LABS:                        10.4   8.18  )-----------( 174      ( 08 Feb 2024 06:25 )             32.7     02-07    148  |  101  |  43  ----------------------------<  93  2.9   |  38  |  1.50    Ca    9.0      07 Feb 2024 06:49    TPro  7.5  /  Alb  2.6  /  TBili  0.7  /  DBili  x   /  AST  17  /  ALT  10  /  AlkPhos  68  02-07          PT/INR - ( 07 Feb 2024 06:49 )   PT: 17.7 sec;   INR: 1.57 ratio               CARDIAC MARKERS ( 06 Feb 2024 17:20 )  x     / 48.3 ng/L / x     / x     / x      CARDIAC MARKERS ( 06 Feb 2024 16:22 )  x     / 54.3 ng/L / x     / x     / x          02-07 Chol 103 mg/dL LDL -- HDL 48 mg/dL Trig 61 mg/dL                  Urinalysis Basic - ( 07 Feb 2024 06:49 )    Color: x / Appearance: x / SG: x / pH: x  Gluc: 93 mg/dL / Ketone: x  / Bili: x / Urobili: x   Blood: x / Protein: x / Nitrite: x   Leuk Esterase: x / RBC: x / WBC x   Sq Epi: x / Non Sq Epi: x / Bacteria: x          RADIOLOGY & ADDITIONAL TESTS:  < from: Xray Chest 1 View- PORTABLE-Urgent (02.06.24 @ 16:50) >      IMPRESSION: Heart enlargement and quite large left effusion.      < end of copied text >  < from: TTE Echo Complete w/o Contrast w/ Doppler (02.07.24 @ 07:49) >    Summary:   1. Normal global left ventricular systolic function.   2. Left ventricular ejection fraction, by visual estimation, is 55 to   60%.   3. Calculated LVEF by Simpsons Biplane Method 57%.   4. Normal left ventricular internal cavity size.   5. There is mild septal left ventricular hypertrophy.   6. Severely enlarged left atrium.   7. Severely enlarged right atrium.   8. Rheumatic mitral valve.   9. Mild thickening and calcification of the anterior and posterior   mitral valve leaflets.  10. Moderate mitral valve stenosis (peak velocity 1.9m/s, mean gradient    8 mmHg at HR 53 BPM, MVA 1.0 cm^2).  11. Mild mitral valve regurgitation.  12. Mild-moderate tricuspid regurgitation.  13. Aortic sclerosis with borderline decreased opening.  14. Mild aortic regurgitation.  15. Estimated pulmonary artery systolic pressure is 53.9 mmHg assuming a   right atrial pressure of 15 mmHg, which is consistent with moderate   pulmonary hypertension.  16. Large pleural effusion in the left lateral region.      < end of copied text >    Care Discussed with Consultants/Other Providers:    Patient is a 89y old  Male who presents with a chief complaint of Shortness of breath (07 Feb 2024 12:40)      Patient seen and examined at bedside. No overnight events reported.  He still c/o dyspnea, no chest pain.     ALLERGIES:  No Known Allergies    MEDICATIONS  (STANDING):  allopurinol 100 milliGRAM(s) Oral daily  aspirin enteric coated 81 milliGRAM(s) Oral daily  atorvastatin 40 milliGRAM(s) Oral at bedtime  budesonide 160 MICROgram(s)/formoterol 4.5 MICROgram(s) Inhaler 2 Puff(s) Inhalation two times a day  furosemide   Injectable 40 milliGRAM(s) IV Push two times a day  metoprolol succinate ER 50 milliGRAM(s) Oral daily  pantoprazole    Tablet 40 milliGRAM(s) Oral before breakfast  petrolatum white Ointment 1 Application(s) Topical three times a day  polyethylene glycol 3350 17 Gram(s) Oral two times a day  tiotropium 2.5 MICROgram(s) Inhaler 2 Puff(s) Inhalation daily  triamcinolone 0.1% Oral Paste 1 Application(s) Topical every 12 hours    MEDICATIONS  (PRN):  acetaminophen     Tablet .. 650 milliGRAM(s) Oral every 6 hours PRN Mild Pain (1 - 3)  albuterol    90 MICROgram(s) HFA Inhaler 2 Puff(s) Inhalation every 6 hours PRN Shortness of Breath and/or Wheezing  aluminum hydroxide/magnesium hydroxide/simethicone Suspension 30 milliLiter(s) Oral every 4 hours PRN Dyspepsia  melatonin 3 milliGRAM(s) Oral at bedtime PRN Insomnia  ondansetron Injectable 4 milliGRAM(s) IV Push every 8 hours PRN Nausea and/or Vomiting    Vital Signs Last 24 Hrs  T(F): 97.9 (08 Feb 2024 05:20), Max: 97.9 (08 Feb 2024 05:20)  HR: 68 (08 Feb 2024 05:20) (55 - 68)  BP: 132/78 (08 Feb 2024 05:20) (120/65 - 156/63)  RR: 17 (08 Feb 2024 05:20) (16 - 17)  SpO2: 98% (08 Feb 2024 05:20) (93% - 99%)  I&O's Summary    07 Feb 2024 07:01  -  08 Feb 2024 07:00  --------------------------------------------------------  IN: 200 mL / OUT: 0 mL / NET: 200 mL      PHYSICAL EXAM:  General: NAD, A/O x 3, appears fatigued.   ENT: No gross hearing impairment, Moist mucous membranes, no thrush  Neck: Supple, No JVD  Lungs:  non-labored breathing, decreased breath sounds b/l, L>R  Cardio: irreg, No murmur  Abdomen: Soft, Nontender, Nondistended; Bowel sounds present  Extremities: left lower ext dressed with gauze wrap, 1+ pitting edema b/l, No calf tenderness  Psych: Appropriate mood and affect    LABS:                        10.4   8.18  )-----------( 174      ( 08 Feb 2024 06:25 )             32.7     02-07    148  |  101  |  43  ----------------------------<  93  2.9   |  38  |  1.50    Ca    9.0      07 Feb 2024 06:49    TPro  7.5  /  Alb  2.6  /  TBili  0.7  /  DBili  x   /  AST  17  /  ALT  10  /  AlkPhos  68  02-07    PT/INR - ( 07 Feb 2024 06:49 )   PT: 17.7 sec;   INR: 1.57 ratio      CARDIAC MARKERS ( 06 Feb 2024 17:20 )  x     / 48.3 ng/L / x     / x     / x      CARDIAC MARKERS ( 06 Feb 2024 16:22 )  x     / 54.3 ng/L / x     / x     / x          02-07 Chol 103 mg/dL LDL -- HDL 48 mg/dL Trig 61 mg/dL    Urinalysis Basic - ( 07 Feb 2024 06:49 )    Color: x / Appearance: x / SG: x / pH: x  Gluc: 93 mg/dL / Ketone: x  / Bili: x / Urobili: x   Blood: x / Protein: x / Nitrite: x   Leuk Esterase: x / RBC: x / WBC x   Sq Epi: x / Non Sq Epi: x / Bacteria: x    RADIOLOGY & ADDITIONAL TESTS:  Xray Chest 1 View- PORTABLE-Urgent (02.06.24 @ 16:50) >      IMPRESSION: Heart enlargement and quite large left effusion.      < end of copied text >  < from: TTE Echo Complete w/o Contrast w/ Doppler (02.07.24 @ 07:49) >    Summary:   1. Normal global left ventricular systolic function.   2. Left ventricular ejection fraction, by visual estimation, is 55 to   60%.   3. Calculated LVEF by Simpsons Biplane Method 57%.   4. Normal left ventricular internal cavity size.   5. There is mild septal left ventricular hypertrophy.   6. Severely enlarged left atrium.   7. Severely enlarged right atrium.   8. Rheumatic mitral valve.   9. Mild thickening and calcification of the anterior and posterior   mitral valve leaflets.  10. Moderate mitral valve stenosis (peak velocity 1.9m/s, mean gradient    8 mmHg at HR 53 BPM, MVA 1.0 cm^2).  11. Mild mitral valve regurgitation.  12. Mild-moderate tricuspid regurgitation.  13. Aortic sclerosis with borderline decreased opening.  14. Mild aortic regurgitation.  15. Estimated pulmonary artery systolic pressure is 53.9 mmHg assuming a   right atrial pressure of 15 mmHg, which is consistent with moderate   pulmonary hypertension.  16. Large pleural effusion in the left lateral region.

## 2024-02-08 NOTE — PROGRESS NOTE ADULT - ASSESSMENT
Assessment: 89 year old male with PMH of Atrial fibrillation on Eliquis, Peripheral arterial disease, Cardiomyopathy, CVA, HTN, COPD, CKD3, multiple hospitalizations for CHF exacerbation in the prior months presents with shortness of breath. Patient states he is compliant with his medications and has an aide administer them everyday. Cardiology consulted for CHF exacerbation    Recommendations  #Heart Failure  -Patient uses O2 at home as needed, currently on room air  -CXR showed large left pleural effusion  -Continuous cardiac monitoring to r/o arrhythmias  -Continue iv diuresis, monitor electrolytes and kidney function, goal k>4, mg>2. if renal function improves, may consider initiating gdmt, ace/arb/arni, farxiga, continue metoprolol and eliquis at this time  -Daily weight monitoring, Strict I/O, Low sodium DASH diet  -Check ECHO to evaluate LV/RV function and valvular abnormalities  - consider pulm consult for left effusion    Carlotta CONNELLY-BC  Assessment: 89 year old male with PMH of Atrial fibrillation on Eliquis, Peripheral arterial disease, Cardiomyopathy, CVA, HTN, COPD, CKD3, multiple hospitalizations for CHF exacerbation in the prior months presents with shortness of breath. Patient states he is compliant with his medications and has an aide administer them everyday. Cardiology consulted for CHF exacerbation    Recommendations  #Heart Failure, SOB  -Patient uses O2 at home as needed, currently on room air  -CXR showed large left pleural effusion  -Continuous cardiac monitoring to r/o arrhythmias  -Continue iv diuresis, monitor electrolytes and kidney function, goal k>4, mg>2. if renal function improves, may consider initiating gdmt, ace/arb/arni, farxiga, continue metoprolol and eliquis at this time  -Daily weight monitoring, Strict I/O, Low sodium DASH diet  - TTE with EF 55-60, biatrial enlargement, Mod mitral stenosis and mod pulm htn. large left pleural effusion again seen. pulm consulted, pt agreeable for thoracentesis today. resume eliquis post procedure as per pulm    Carlotta LOYD-BC

## 2024-02-08 NOTE — PROGRESS NOTE ADULT - ASSESSMENT
Physical Examination:  GENERAL:               Alert, Oriented, No acute distress.    HEENT:                   No JVD, Moist MM  PULM:                     Bilateral air entry, diminished to ausciltion on left , no significant sputum production, No Rales, No Rhonchi, No Wheezing  CVS:                         S1, S2,  +murmur  ABD:                        Soft, nondistended, nontender, normoactive bowel sounds,   EXT:                         +edema, nontender, No Cyanosis or Clubbing   NEURO:                  Alert, oriented, interactive, nonfocal, follows commands  PSYC:                      Calm, + Insight.         Assessment  Dyspnea suspect due to acute on chronic Effusion Left  underlying Diastolic CHF possible  Chronic Left pleural effusions  s/p thoracentesis 11/24/23- Exudate     Afib on a/c  Cigar smoker, at risk for copd.   Worsening renal function on CKD    Plan  pleural effusion recurrent, Exudative and serosanguinous with previously negative cytology  repeat thoracentesis done today drained 1L fluid  f/u Results  can restart a/c tonight  will need Pleurex and pleural biopsy.     d/w pt and bedside team   will try to obtain pleurex/pleural biopsy

## 2024-02-08 NOTE — PROGRESS NOTE ADULT - SUBJECTIVE AND OBJECTIVE BOX
Follow-up Pulmonary Progress Note  Chief Complaint : Heart failure        patient seen and examined  comfortable  on room air  less sob, cp, palp  risks/benefits/alternatives to thoracentesis explained  and agreed to thoracentesis       Allergies :No Known Allergies      PAST MEDICAL & SURGICAL HISTORY:  Afib    Congestive heart failure (CHF)    CVA (cerebral vascular accident)    Hypertension, unspecified type    Chronic obstructive pulmonary disease (COPD)    No significant past surgical history        Medications:  MEDICATIONS  (STANDING):  allopurinol 100 milliGRAM(s) Oral daily  aspirin enteric coated 81 milliGRAM(s) Oral daily  atorvastatin 40 milliGRAM(s) Oral at bedtime  budesonide 160 MICROgram(s)/formoterol 4.5 MICROgram(s) Inhaler 2 Puff(s) Inhalation two times a day  metoprolol succinate ER 50 milliGRAM(s) Oral daily  pantoprazole    Tablet 40 milliGRAM(s) Oral before breakfast  petrolatum white Ointment 1 Application(s) Topical three times a day  polyethylene glycol 3350 17 Gram(s) Oral two times a day  tiotropium 2.5 MICROgram(s) Inhaler 2 Puff(s) Inhalation daily  triamcinolone 0.1% Oral Paste 1 Application(s) Topical every 12 hours    MEDICATIONS  (PRN):  acetaminophen     Tablet .. 650 milliGRAM(s) Oral every 6 hours PRN Mild Pain (1 - 3)  albuterol    90 MICROgram(s) HFA Inhaler 2 Puff(s) Inhalation every 6 hours PRN Shortness of Breath and/or Wheezing  aluminum hydroxide/magnesium hydroxide/simethicone Suspension 30 milliLiter(s) Oral every 4 hours PRN Dyspepsia  melatonin 3 milliGRAM(s) Oral at bedtime PRN Insomnia  ondansetron Injectable 4 milliGRAM(s) IV Push every 8 hours PRN Nausea and/or Vomiting      Antibiotics History      Heme Medications   aspirin enteric coated 81 milliGRAM(s) Oral daily, 02-06-24 @ 18:13      GI Medications  aluminum hydroxide/magnesium hydroxide/simethicone Suspension 30 milliLiter(s) Oral every 4 hours, 02-06-24 @ 18:44, Routine PRN  pantoprazole    Tablet 40 milliGRAM(s) Oral before breakfast, 02-06-24 @ 18:17, Routine  polyethylene glycol 3350 17 Gram(s) Oral two times a day, 02-06-24 @ 18:16, Routine        LABS:                        10.4   8.18  )-----------( 174      ( 08 Feb 2024 06:25 )             32.7     02-08    143  |  101  |  49<H>  ----------------------------<  109<H>  4.4   |  32<H>  |  1.87<H>    Ca    8.6      08 Feb 2024 06:25  Mg     2.2     02-08    TPro  7.5  /  Alb  2.6<L>  /  TBili  0.7  /  DBili  x   /  AST  17  /  ALT  10  /  AlkPhos  68  02-07           VITALS:  T(C): 36.7 (02-08-24 @ 13:39), Max: 36.7 (02-08-24 @ 13:39)  T(F): 98 (02-08-24 @ 13:39), Max: 98 (02-08-24 @ 13:39)  HR: 79 (02-08-24 @ 13:39) (65 - 87)  BP: 99/51 (02-08-24 @ 13:39) (99/51 - 132/78)  BP(mean): --  ABP: --  ABP(mean): --  RR: 18 (02-08-24 @ 13:39) (17 - 18)  SpO2: 92% (02-08-24 @ 13:39) (92% - 98%)  CVP(mm Hg): --  CVP(cm H2O): --    Ins and Outs     02-07-24 @ 07:01  -  02-08-24 @ 07:00  --------------------------------------------------------  IN: 200 mL / OUT: 0 mL / NET: 200 mL    02-08-24 @ 07:01  -  02-08-24 @ 14:38  --------------------------------------------------------  IN: 0 mL / OUT: 1100 mL / NET: -1100 mL        Height (cm): 167.6 (02-06-24 @ 22:00)  Weight (kg): 70.3 (02-06-24 @ 22:00)  BMI (kg/m2): 25 (02-06-24 @ 22:00)        I&O's Detail    07 Feb 2024 07:01  -  08 Feb 2024 07:00  --------------------------------------------------------  IN:    Oral Fluid: 200 mL  Total IN: 200 mL    OUT:  Total OUT: 0 mL    Total NET: 200 mL      08 Feb 2024 07:01  -  08 Feb 2024 14:38  --------------------------------------------------------  IN:  Total IN: 0 mL    OUT:    Drain (mL): 1000 mL    Voided (mL): 100 mL  Total OUT: 1100 mL    Total NET: -1100 mL

## 2024-02-08 NOTE — PROGRESS NOTE ADULT - NS ATTEND AMEND GEN_ALL_CORE FT
Bloody plerual effusion, s/p thoracocentesis today  Will see if we can speak with thoracic surgery to have him evaluated again....for pleural biopsy and pleurx  Hold AC for now  C/w lasix as per cardiology  Noted increase in creatnine....    DVT PPX: ICD  AM labs

## 2024-02-08 NOTE — PROGRESS NOTE ADULT - NS PANP COMMENT GEN_ALL_CORE FT
Patient examined independently. offers no complaints. is lying flat without sob s/p thoracentesis    for now no new recoomendations     no plans for intervention on mitral valve in this 89 yr old patient with recurrent left pleural effusion.    pleurex was apparently deferred last month due to acute cholecystitis

## 2024-02-08 NOTE — PROGRESS NOTE ADULT - ASSESSMENT
89 year old male with PMH of afib on Eliquis, CHF (EF 45-50%, combined systolic and diastolic), CVA, HTN, COPD, CKD3, multiple hospitalizations for CHF exacerbation in the prior months. Patient also recently had a left sided pleural effusion that was drained last hospitalization.  CXR on this admission w/ large effusion. Patient to be admitted for CHF exacerbation.     #SOB 2/2 CHF exacerbation  #Large left pleural effusion  - pt with multiple admissions recently and with drainage of pleural effusions  - Echo 2/7: EF 55-60%, severely enlarged right/left atriums, pulm htn, mod mitral valve stenosis  - on supplemental O2; 2L nc and sats 98%  - CXR: Heart enlargement and quite large left effusion  - EKG showed afib; rate controlled   - continue lasix 40 mg. IV bid  - Troponin negative 54-->48   - BNP 3572   - on fluid restriction 1L daily  - CArdiology following; cont current meds-- pt may need eval at [facility] Structural Heart team, will wait for more input today from Cardiology  - Farxiga held while on IV lasix  - Pulmonary consulted; pt to have Thoracentesis today  - Palliative consulted for GOC; pt is currently FULL CODE    #left lower ext blister   - wound care instructions from last hospitalization: Clean with NS (clean in circular motions with NS and gauze). Pat dry. Apply liquid barrier film to periwound skin, allow to dry. Place non adherent silicone layer dressing (Adaptic touch) over bases of wound, cover with Aquacel AG hydrofiber sheet, cover with ABD pad and kerlix, secure with paper tape. Change daily or PRN if soiled.    #Hx of CVA   #HTN  #CHF; chronic   #Afib; chronic   - no deficits noted   - continue statin, metoprolol, ASA and Eliquis    #COPD  - no home o2, but patient reports he uses oxygen cans OTC he buys from store   - continue albuterol prn  - continue spiriva, symbicort     #IRINEO 2/2 CKD3   - Cr. 1.5, around baseline   - trend Creat  -avoid nephrotoxins    #Hypernatremia   #Hypokalemia; sec. to Lasix  -pt had K repleted, cont to monitor bmp    #DVT ppx:  eliquis held for procedure today    GOC - full code     Dispo:  called Daughter Salena, voicemail full--unable to leave a message.

## 2024-02-08 NOTE — PHARMACOTHERAPY INTERVENTION NOTE - COMMENTS
counseled patient and attempted to contact daughter left voicemail with callback number to discuss any medication related issues  Counseled patient and son in law, Grant. Patient still doesn't have Part D coverage. He has been getting generic meds filled at an independent pharmacy and brand name meds (such as eliquis and farxiga) through samples from doctors offices. Recommended using [facility] pharmacy as they have low cost medication plans. Grant is agreeable to sending medications to [facility]

## 2024-02-09 NOTE — CONSULT NOTE ADULT - ASSESSMENT
89 yom PMH of Atrial fibrillation on Eliquis, Peripheral arterial disease, Cardiomyopathy, CVA, HTN, COPD, CKD3, multiple hospitalizations for CHF exacerbation in the prior months presents with shortness of breath. General surgery consulted for Right breast mass.

## 2024-02-09 NOTE — PHYSICAL THERAPY INITIAL EVALUATION ADULT - ADDITIONAL COMMENTS
Pt lives with family in a private house with 2 kev, no stairs in house.  Pt was independent with all ADLs and ambulated with a rolling walker at baseline.

## 2024-02-09 NOTE — OCCUPATIONAL THERAPY INITIAL EVALUATION ADULT - RANGE OF MOTION EXAMINATION, UPPER EXTREMITY
Left UE AROM WFL except shoulder flexion limited ~85/Left UE Active ROM was WFL (within functional limits)/Right UE Active ROM was WFL (within functional limits)

## 2024-02-09 NOTE — PROGRESS NOTE ADULT - NS ATTEND AMEND GEN_ALL_CORE FT
Bloody plerual effusion, s/p thoracocentesis 2/8/24, 1L removed  Spoke with transfer center; accepted patient to [facility] pending bed availability  Restarted eliquis  C/w lasix 40mg IV daily as per cardiology    DVT PPX: ICD  AM labs. 2.5 x 1.4 cm right retroareolar breast nodule larger than 9/19/2023.   Will obtain right breast ultrasound, surgery evaluation while here....    Bloody plerual effusion, s/p thoracocentesis 2/8/24, 1L removed  Spoke with transfer center; accepted patient to [facility] pending bed availability  Restarted eliquis  C/w lasix 40mg IV daily as per cardiology    DVT PPX: ICD  AM labs.

## 2024-02-09 NOTE — CONSULT NOTE ADULT - NS ATTEND AMEND GEN_ALL_CORE FT
I have seen and examined the patient. I have discussed case with DENNISE Sandoval.    Jaylon Antony is a 89 year old man with past medical history of Atrial fibrillation (on Eliquis), HFrecEF (LVEF 25-30% in 2019, now 55-60% in 2024), Moderate mitral valve stenosis, Hypertension, Chronic kidney disease, COPD and history of CVA with recurrent hospitalizations for congestive heart failure likely from medication noncompliance and recent hospitalization at [facility] last month for congestive heart failure exacerbation deemed not to require chest tube placement at the time, also with moderate to severe PAD - deemed to be poor surgical candidate for lower extremity vascular intervention, now presents with progressive dyspnea, found to have acute on chronic diastolic heart failure exacerbation with large left pleural effusion.    ECG on admission is poor baseline. Troponins negative x 2, does not appear to be an acute coronary syndrome. 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 Overall, symptoms of CHF likely combination from ineffective outpatient diuretic regimen/questionable compliance and possibly also from mitral stenosis. Agree with IV diuresis, Metoprolol succinate 50 mg daily, goal net negative 1L in 24 hours, monitor renal function, follow up Pulmonary consult for possible thoracentesis. Recommend [facility] structural heart team evaluation regarding moderate mitral valve stenosis this admission - does not appear severe enough to cause this degree of CHF, but would be beneficial to review with structural heart team. Plan to resume SGLT2-I when euvolemic. Plan to resume Eliquis for stroke prophylaxis if no urgent procedures planned.    Will sign out this case to cardiologist to follow along tomorrow.    Kevin Hong MD  Cardiology
agree

## 2024-02-09 NOTE — DISCHARGE NOTE PROVIDER - NSDCQMSTROKE_NEU_ALL_CORE
"[facility]   Left Message Note  [facility]    Reason for Contact: Referred to contact Kamran per his response to the oncology distress screen. \"Do you struggle with the loss of meaning and silvio in your life? : QUITE A BIT\"    Intervention: Documentation reviewed. A voicemail message was left with contact information.    Plan: Will await return phone call. Spiritual [facility] remains available.    Shaniqua Russell  Associate   Phone [phone]  " No

## 2024-02-09 NOTE — PROGRESS NOTE ADULT - SUBJECTIVE AND OBJECTIVE BOX
Patient is a 89y old  Male who presents with a chief complaint of Shortness of breath (08 Feb 2024 14:38)      Patient seen and examined at bedside. No overnight events reported.     ALLERGIES:  No Known Allergies    MEDICATIONS  (STANDING):  allopurinol 100 milliGRAM(s) Oral daily  aspirin enteric coated 81 milliGRAM(s) Oral daily  atorvastatin 40 milliGRAM(s) Oral at bedtime  budesonide 160 MICROgram(s)/formoterol 4.5 MICROgram(s) Inhaler 2 Puff(s) Inhalation two times a day  furosemide   Injectable 40 milliGRAM(s) IV Push daily  metoprolol succinate ER 50 milliGRAM(s) Oral daily  pantoprazole    Tablet 40 milliGRAM(s) Oral before breakfast  petrolatum white Ointment 1 Application(s) Topical three times a day  polyethylene glycol 3350 17 Gram(s) Oral two times a day  tiotropium 2.5 MICROgram(s) Inhaler 2 Puff(s) Inhalation daily  triamcinolone 0.1% Oral Paste 1 Application(s) Topical every 12 hours    MEDICATIONS  (PRN):  acetaminophen     Tablet .. 650 milliGRAM(s) Oral every 6 hours PRN Mild Pain (1 - 3)  albuterol    90 MICROgram(s) HFA Inhaler 2 Puff(s) Inhalation every 6 hours PRN Shortness of Breath and/or Wheezing  aluminum hydroxide/magnesium hydroxide/simethicone Suspension 30 milliLiter(s) Oral every 4 hours PRN Dyspepsia  melatonin 3 milliGRAM(s) Oral at bedtime PRN Insomnia  ondansetron Injectable 4 milliGRAM(s) IV Push every 8 hours PRN Nausea and/or Vomiting    Vital Signs Last 24 Hrs  T(F): 97.6 (09 Feb 2024 04:50), Max: 98 (08 Feb 2024 13:39)  HR: 61 (09 Feb 2024 04:50) (61 - 87)  BP: 138/54 (09 Feb 2024 04:50) (99/51 - 138/54)  RR: 19 (09 Feb 2024 04:50) (17 - 19)  SpO2: 97% (09 Feb 2024 04:50) (92% - 97%)  I&O's Summary    08 Feb 2024 07:01  -  09 Feb 2024 07:00  --------------------------------------------------------  IN: 0 mL / OUT: 1100 mL / NET: -1100 mL      PHYSICAL EXAM:  General: NAD, A/O x 3  ENT: No gross hearing impairment, Moist mucous membranes, no thrush  Neck: Supple, No JVD  Lungs: Clear to auscultation bilaterally, good air entry, non-labored breathing  Cardio: RRR, S1/S2, No murmur  Abdomen: Soft, Nontender, Nondistended; Bowel sounds present  Extremities: No calf tenderness, No cyanosis, No pitting edema  Psych: Appropriate mood and affect    LABS:                        10.1   9.74  )-----------( 166      ( 09 Feb 2024 06:35 )             32.2     02-09    144  |  102  |  54  ----------------------------<  109  3.7   |  36  |  1.80    Ca    8.5      09 Feb 2024 06:35  Mg     2.4     02-09    TPro  7.5  /  Alb  2.6  /  TBili  0.7  /  DBili  x   /  AST  17  /  ALT  10  /  AlkPhos  68  02-07          PT/INR - ( 07 Feb 2024 06:49 )   PT: 17.7 sec;   INR: 1.57 ratio               CARDIAC MARKERS ( 06 Feb 2024 17:20 )  x     / 48.3 ng/L / x     / x     / x      CARDIAC MARKERS ( 06 Feb 2024 16:22 )  x     / 54.3 ng/L / x     / x     / x          02-07 Chol 103 mg/dL LDL -- HDL 48 mg/dL Trig 61 mg/dL                  Urinalysis Basic - ( 09 Feb 2024 06:35 )    Color: x / Appearance: x / SG: x / pH: x  Gluc: 109 mg/dL / Ketone: x  / Bili: x / Urobili: x   Blood: x / Protein: x / Nitrite: x   Leuk Esterase: x / RBC: x / WBC x   Sq Epi: x / Non Sq Epi: x / Bacteria: x        Culture - Body Fluid with Gram Stain (collected 08 Feb 2024 12:40)  Source: .Body Fluid Other, Pleural Fluid  Gram Stain (09 Feb 2024 00:58):    No polymorphonuclear leukocytes seen per low power field    No organisms seen per oil power field    Culture - Fungal, Body Fluid (collected 08 Feb 2024 12:40)  Source: Pleural Fl Pleural Fluid  Preliminary Report (09 Feb 2024 08:31):    Testing in progress        RADIOLOGY & ADDITIONAL TESTS:    Care Discussed with Consultants/Other Providers:    Patient is a 89y old  Male who presents with a chief complaint of Shortness of breath (08 Feb 2024 14:38)      Patient seen and examined at bedside. No overnight events reported.  His breathing is better today.     ALLERGIES:  No Known Allergies    MEDICATIONS  (STANDING):  allopurinol 100 milliGRAM(s) Oral daily  aspirin enteric coated 81 milliGRAM(s) Oral daily  atorvastatin 40 milliGRAM(s) Oral at bedtime  budesonide 160 MICROgram(s)/formoterol 4.5 MICROgram(s) Inhaler 2 Puff(s) Inhalation two times a day  furosemide   Injectable 40 milliGRAM(s) IV Push daily  metoprolol succinate ER 50 milliGRAM(s) Oral daily  pantoprazole    Tablet 40 milliGRAM(s) Oral before breakfast  petrolatum white Ointment 1 Application(s) Topical three times a day  polyethylene glycol 3350 17 Gram(s) Oral two times a day  tiotropium 2.5 MICROgram(s) Inhaler 2 Puff(s) Inhalation daily  triamcinolone 0.1% Oral Paste 1 Application(s) Topical every 12 hours    MEDICATIONS  (PRN):  acetaminophen     Tablet .. 650 milliGRAM(s) Oral every 6 hours PRN Mild Pain (1 - 3)  albuterol    90 MICROgram(s) HFA Inhaler 2 Puff(s) Inhalation every 6 hours PRN Shortness of Breath and/or Wheezing  aluminum hydroxide/magnesium hydroxide/simethicone Suspension 30 milliLiter(s) Oral every 4 hours PRN Dyspepsia  melatonin 3 milliGRAM(s) Oral at bedtime PRN Insomnia  ondansetron Injectable 4 milliGRAM(s) IV Push every 8 hours PRN Nausea and/or Vomiting    Vital Signs Last 24 Hrs  T(F): 97.6 (09 Feb 2024 04:50), Max: 98 (08 Feb 2024 13:39)  HR: 61 (09 Feb 2024 04:50) (61 - 87)  BP: 138/54 (09 Feb 2024 04:50) (99/51 - 138/54)  RR: 19 (09 Feb 2024 04:50) (17 - 19)  SpO2: 97% (09 Feb 2024 04:50) (92% - 97%)  I&O's Summary    08 Feb 2024 07:01  -  09 Feb 2024 07:00  --------------------------------------------------------  IN: 0 mL / OUT: 1100 mL / NET: -1100 mL      PHYSICAL EXAM:  General: NAD, A/O x 3  ENT: No gross hearing impairment, Moist mucous membranes, no thrush  Neck: Supple, No JVD  Lungs:  good air entry, non-labored breathing, decreased breath sounds  Cardio: irreg, No murmur  Abdomen: Soft, Nontender, Nondistended; Bowel sounds present  Extremities: No calf tenderness, No cyanosis, 1+ pitting edema  Neuro: alert, nonfocal    LABS:                        10.1   9.74  )-----------( 166      ( 09 Feb 2024 06:35 )             32.2     02-09    144  |  102  |  54  ----------------------------<  109  3.7   |  36  |  1.80    Ca    8.5      09 Feb 2024 06:35  Mg     2.4     02-09    TPro  7.5  /  Alb  2.6  /  TBili  0.7  /  DBili  x   /  AST  17  /  ALT  10  /  AlkPhos  68  02-07          PT/INR - ( 07 Feb 2024 06:49 )   PT: 17.7 sec;   INR: 1.57 ratio               CARDIAC MARKERS ( 06 Feb 2024 17:20 )  x     / 48.3 ng/L / x     / x     / x      CARDIAC MARKERS ( 06 Feb 2024 16:22 )  x     / 54.3 ng/L / x     / x     / x          02-07 Chol 103 mg/dL LDL -- HDL 48 mg/dL Trig 61 mg/dL                  Urinalysis Basic - ( 09 Feb 2024 06:35 )    Color: x / Appearance: x / SG: x / pH: x  Gluc: 109 mg/dL / Ketone: x  / Bili: x / Urobili: x   Blood: x / Protein: x / Nitrite: x   Leuk Esterase: x / RBC: x / WBC x   Sq Epi: x / Non Sq Epi: x / Bacteria: x        Culture - Body Fluid with Gram Stain (collected 08 Feb 2024 12:40)  Source: .Body Fluid Other, Pleural Fluid  Gram Stain (09 Feb 2024 00:58):    No polymorphonuclear leukocytes seen per low power field    No organisms seen per oil power field    Culture - Fungal, Body Fluid (collected 08 Feb 2024 12:40)  Source: Pleural Fl Pleural Fluid  Preliminary Report (09 Feb 2024 08:31):    Testing in progress        RADIOLOGY & ADDITIONAL TESTS:    Care Discussed with Consultants/Other Providers:    Patient is a 89y old  Male who presents with a chief complaint of Shortness of breath (08 Feb 2024 14:38)      Patient seen and examined at bedside. No overnight events reported.  His breathing is better today.     ALLERGIES:  No Known Allergies    MEDICATIONS  (STANDING):  allopurinol 100 milliGRAM(s) Oral daily  aspirin enteric coated 81 milliGRAM(s) Oral daily  atorvastatin 40 milliGRAM(s) Oral at bedtime  budesonide 160 MICROgram(s)/formoterol 4.5 MICROgram(s) Inhaler 2 Puff(s) Inhalation two times a day  furosemide   Injectable 40 milliGRAM(s) IV Push daily  metoprolol succinate ER 50 milliGRAM(s) Oral daily  pantoprazole    Tablet 40 milliGRAM(s) Oral before breakfast  petrolatum white Ointment 1 Application(s) Topical three times a day  polyethylene glycol 3350 17 Gram(s) Oral two times a day  tiotropium 2.5 MICROgram(s) Inhaler 2 Puff(s) Inhalation daily  triamcinolone 0.1% Oral Paste 1 Application(s) Topical every 12 hours    MEDICATIONS  (PRN):  acetaminophen     Tablet .. 650 milliGRAM(s) Oral every 6 hours PRN Mild Pain (1 - 3)  albuterol    90 MICROgram(s) HFA Inhaler 2 Puff(s) Inhalation every 6 hours PRN Shortness of Breath and/or Wheezing  aluminum hydroxide/magnesium hydroxide/simethicone Suspension 30 milliLiter(s) Oral every 4 hours PRN Dyspepsia  melatonin 3 milliGRAM(s) Oral at bedtime PRN Insomnia  ondansetron Injectable 4 milliGRAM(s) IV Push every 8 hours PRN Nausea and/or Vomiting    Vital Signs Last 24 Hrs  T(F): 97.6 (09 Feb 2024 04:50), Max: 98 (08 Feb 2024 13:39)  HR: 61 (09 Feb 2024 04:50) (61 - 87)  BP: 138/54 (09 Feb 2024 04:50) (99/51 - 138/54)  RR: 19 (09 Feb 2024 04:50) (17 - 19)  SpO2: 97% (09 Feb 2024 04:50) (92% - 97%)  I&O's Summary    08 Feb 2024 07:01  -  09 Feb 2024 07:00  --------------------------------------------------------  IN: 0 mL / OUT: 1100 mL / NET: -1100 mL      PHYSICAL EXAM:  General: NAD, A/O x 3  ENT: No gross hearing impairment, Moist mucous membranes, no thrush  Neck: Supple, No JVD  Lungs:  good air entry, non-labored breathing, decreased breath sounds  Cardio: irreg, No murmur  Abdomen: Soft, Nontender, Nondistended; Bowel sounds present  Extremities: No calf tenderness, No cyanosis, 1+ pitting edema  Neuro: alert, nonfocal    LABS:                        10.1   9.74  )-----------( 166      ( 09 Feb 2024 06:35 )             32.2     02-09    144  |  102  |  54  ----------------------------<  109  3.7   |  36  |  1.80    Ca    8.5      09 Feb 2024 06:35  Mg     2.4     02-09    TPro  7.5  /  Alb  2.6  /  TBili  0.7  /  DBili  x   /  AST  17  /  ALT  10  /  AlkPhos  68  02-07    PT/INR - ( 07 Feb 2024 06:49 )   PT: 17.7 sec;   INR: 1.57 ratio      CARDIAC MARKERS ( 06 Feb 2024 17:20 )  x     / 48.3 ng/L / x     / x     / x      CARDIAC MARKERS ( 06 Feb 2024 16:22 )  x     / 54.3 ng/L / x     / x     / x        02-07 Chol 103 mg/dL LDL -- HDL 48 mg/dL Trig 61 mg/dL    Urinalysis Basic - ( 09 Feb 2024 06:35 )    Color: x / Appearance: x / SG: x / pH: x  Gluc: 109 mg/dL / Ketone: x  / Bili: x / Urobili: x   Blood: x / Protein: x / Nitrite: x   Leuk Esterase: x / RBC: x / WBC x   Sq Epi: x / Non Sq Epi: x / Bacteria: x    Culture - Body Fluid with Gram Stain (collected 08 Feb 2024 12:40)  Source: .Body Fluid Other, Pleural Fluid  Gram Stain (09 Feb 2024 00:58):    No polymorphonuclear leukocytes seen per low power field    No organisms seen per oil power field    Culture - Fungal, Body Fluid (collected 08 Feb 2024 12:40)  Source: Pleural Fl Pleural Fluid  Preliminary Report (09 Feb 2024 08:31):    Testing in progress      RADIOLOGY  CT Chest No Cont (02.08.24 @ 14:40) >    IMPRESSION:    2.5 x 1.4 cm right retroareolar breast nodule larger than 9/19/2023.   Recommend ultrasound evaluation and surgery consultation.    Lingular and left lower lobe groundglass infiltrates likely pneumonia.    Small bilateral pleural effusions. Cardiomegaly.    Stable prominent mediastinal lymph nodes.    Redemonstrated asymmetrical gynecomastia.

## 2024-02-09 NOTE — PROGRESS NOTE ADULT - ASSESSMENT
89 year old male with PMH of afib on Eliquis, CHF (EF 45-50%, combined systolic and diastolic), CVA, HTN, COPD, CKD3, multiple hospitalizations for CHF exacerbation in the prior months. Patient also recently had a left sided pleural effusion that was drained last hospitalization.  CXR on this admission w/ large effusion. Patient to be admitted for CHF exacerbation. Palliative care c/s for recurrent admissions.    Dyspnea 2/2 acute on chronic CHF exacerbation  - pt with recurrent Large left pleural effusion; pt sent to [facility] last month for pigtail cath placement evaluation - pt improved with diuresis and IR did not place  - Cardiology following, echo ordered and reviewed  - Pulm following, s/p thoracentesis yesterday    Advanced care planning  - HCP: Antonietta Love  - Pt is FULL CODE    Encounter for Palliative Care  - Spoke to pt at bedside regarding recurrent pleural effusion and need for pleurex/biopsy. We discussed possibility of malignancy and the option to pursue chemotherapy with oncology if cancer is underlying cause of pt's recurrent effusions. Pt expressed he would likely not want to pursue chemotherapy options as it would prolong "misery". We then discussed the option of foregoing pleural biopsy and having pleurex placed. He wanted to discuss this further with the pulmonologist. I asked if he wanted me to talk to Antonietta about our discussion but he deferred. Supportive care given.

## 2024-02-09 NOTE — PROGRESS NOTE ADULT - ASSESSMENT
89 year old male with PMH of afib on Eliquis, CHF (EF 45-50%, combined systolic and diastolic), CVA, HTN, COPD, CKD3, multiple hospitalizations for CHF exacerbation in the prior months. Patient also recently had a left sided pleural effusion that was drained last hospitalization.  CXR on this admission w/ large effusion. Patient to be admitted for CHF exacerbation.     #SOB 2/2 CHF exacerbation  #Large left pleural effusion  - pt with multiple admissions recently and with drainage of pleural effusions  - Echo 2/7: EF 55-60%, severely enlarged right/left atriums, pulm htn, mod mitral valve stenosis  - on supplemental O2; 2L nc and sats 98%  - CXR: Heart enlargement and quite large left effusion  - EKG showed afib; rate controlled   - continue lasix 40 mg. IV bid  - Troponin negative 54-->48   - BNP 3572   - on fluid restriction 1L daily  - CArdiology following; cont current meds-- pt may need eval at [facility] Structural Heart team, will wait for more input today from Cardiology  - Farxiga held while on IV lasix  - Pulmonary consulted; pt to have Thoracentesis today  - Palliative consulted for GOC; pt is currently FULL CODE    #left lower ext blister   - wound care instructions from last hospitalization: Clean with NS (clean in circular motions with NS and gauze). Pat dry. Apply liquid barrier film to periwound skin, allow to dry. Place non adherent silicone layer dressing (Adaptic touch) over bases of wound, cover with Aquacel [employer] hydrofiber sheet, cover with ABD pad and kerlix, secure with paper tape. Change daily or PRN if soiled.    #Hx of CVA   #HTN  #CHF; chronic   #Afib; chronic   - no deficits noted   - continue statin, metoprolol, ASA and Eliquis    #COPD  - no home o2, but patient reports he uses oxygen cans OTC he buys from store   - continue albuterol prn  - continue spiriva, symbicort     #IRINEO 2/2 CKD3   - Cr. 1.5, around baseline   - trend Creat  -avoid nephrotoxins    #Hypernatremia   #Hypokalemia; sec. to Lasix  -pt had K repleted, cont to monitor bmp    #DVT ppx:  eliquis held for procedure today    GOC - full code     Dispo:  called Daughter Salena, voicemail full--unable to leave a message.  89 year old male with PMH of afib on Eliquis, CHF (EF 45-50%, combined systolic and diastolic), CVA, HTN, COPD, CKD3, multiple hospitalizations for CHF exacerbation in the prior months. Patient also recently had a left sided pleural effusion that was drained last hospitalization.  CXR on this admission w/ large effusion. Patient to be admitted for CHF exacerbation.     #SOB 2/2 CHF exacerbation  #Large left pleural effusion  - s/p Thoracentesis on 12/8, 1 L drained, bloody per Dr. Oretz  - rec is for Pleurex cath and possible Pleural bx; transfer center called and transfer initiated for [facility]--bed pending  - pt with multiple admissions recently and with drainage of pleural effusions  - Echo 2/7: EF 55-60%, severely enlarged right/left atriums, pulm htn, mod mitral valve stenosis  - on supplemental O2; 2L nc and sats 98%  - CXR: Heart enlargement and quite large left effusion  - continue lasix 40 mg. IV daily  - Troponin negative 54-->48, probnp 3572   - on fluid restriction 1L daily  - CArdiology following; cont current meds-- pt probably not a good candidate for Valvular repair per Dr. Seo  - Farxiga held while on IV lasix  - Palliative consulted for GOC; pt is currently FULL CODE    #left lower ext blister   - wound care instructions from last hospitalization: Clean with NS (clean in circular motions with NS and gauze). Pat dry. Apply liquid barrier film to periwound skin, allow to dry. Place non adherent silicone layer dressing (Adaptic touch) over bases of wound, cover with Aquacel AG hydrofiber sheet, cover with ABD pad and kerlix, secure with paper tape. Change daily or PRN if soiled.    #Hx of CVA   #HTN  #CHF; chronic   #Afib; chronic   - no deficits noted   - continue statin, metoprolol, ASA  - Eliquis restarted     #COPD  - no home o2, but patient reports he uses oxygen cans OTC he buys from store   - continue albuterol prn  - continue spiriva, symbicort     #IRINEO 2/2 CKD3   - Cr. 1.80, around baseline   - trend Creat  -avoid nephrotoxins    #Hypernatremia; resolved  #Hypokalemia; sec. to Lasix  -pt had K repleted, cont to monitor bmp    #DVT ppx:  Eliquis restarted    GOC - full code     Dispo:  called Daughter Salena, voicemail full--unable to leave a message.  89 year old male with PMH of afib on Eliquis, CHF (EF 45-50%, combined systolic and diastolic), CVA, HTN, COPD, CKD3, multiple hospitalizations for CHF exacerbation in the prior months. Patient also recently had a left sided pleural effusion that was drained last hospitalization.  CXR on this admission w/ large effusion. Patient to be admitted for CHF exacerbation.     #Acute on chronic hypoxic respiratory failure 2/2 bloody large left pleural effusion  - s/p Thoracentesis on 12/8, 1 L drained, bloody per Dr. Ortez  - rec is for Pleurex cath and possible Pleural bx; transfer center called and transfer initiated for [facility]--bed pending  - pt with multiple admissions recently and with drainage of pleural effusions  - Echo 2/7: EF 55-60%, severely enlarged right/left atriums, pulm htn, mod mitral valve stenosis  - on supplemental O2; 2L nc and sats 98%  - CXR: Heart enlargement and quite large left effusion  - continue lasix 40 mg. IV daily  - Troponin negative 54-->48, probnp 3572   - on fluid restriction 1L daily  - Cardiology following; cont current meds-- pt probably not a good candidate for Valvular repair per Dr. Gabbi Spring held while on IV lasix  - Palliative consulted for GOC; pt is currently FULL CODE    #left lower ext blister   - wound care instructions from last hospitalization: Clean with NS (clean in circular motions with NS and gauze). Pat dry. Apply liquid barrier film to periwound skin, allow to dry. Place non adherent silicone layer dressing (Adaptic touch) over bases of wound, cover with Aquacel AG hydrofiber sheet, cover with ABD pad and kerlix, secure with paper tape. Change daily or PRN if soiled.    #Hx of CVA   #HTN  #CHF; chronic   #Afib; chronic   - no deficits noted   - continue statin, metoprolol, ASA  - Eliquis restarted     #COPD  - no home o2, but patient reports he uses oxygen cans OTC he buys from store   - continue albuterol prn  - continue spiriva, symbicort     #IRINEO 2/2 CKD3   - Cr. 1.80, around baseline   - trend Creat  -avoid nephrotoxins    #Hypernatremia; resolved  #Hypokalemia; sec. to Lasix  -pt had K repleted, cont to monitor bmp    #DVT ppx:  Eliquis restarted    GOC - full code     Dispo:  called Daughter Salena, voicemail full--unable to leave a message.  89 year old male with PMH of afib on Eliquis, CHF (EF 45-50%, combined systolic and diastolic), CVA, HTN, COPD, CKD3, multiple hospitalizations for CHF exacerbation in the prior months. Patient also recently had a left sided pleural effusion that was drained last hospitalization.  CXR on this admission w/ large effusion. Patient to be admitted for CHF exacerbation.     #Acute on chronic hypoxic respiratory failure 2/2 bloody large left pleural effusion  - s/p Thoracentesis on 12/8, 1 L drained, bloody per Dr. Ortez  - rec is for Pleurex cath and possible Pleural bx; transfer center called and transfer initiated for [facility]--bed pending  - pt with multiple admissions recently and with drainage of pleural effusions  - Echo 2/7: EF 55-60%, severely enlarged right/left atriums, pulm htn, mod mitral valve stenosis  - on supplemental O2; 2L nc and sats 98%  - CXR: Heart enlargement and quite large left effusion  - continue lasix 40 mg. IV daily  - Troponin negative 54-->48, probnp 3572   - on fluid restriction 1L daily  - Cardiology following; cont current meds-- pt probably not a good candidate for Valvular repair per Dr. Gabbi Spring held while on IV lasix  - Palliative consulted for GOC; pt is currently FULL CODE    #2.5 x 1.4 cm right retroareolar breast nodule larger than 9/19/2023.   - Will obtain right breast ULT and surgery evaluation while he is here  - If he leaves for [facility]......will recommend follow up over there    #left lower ext blister   - wound care instructions from last hospitalization: Clean with NS (clean in circular motions with NS and gauze). Pat dry. Apply liquid barrier film to periwound skin, allow to dry. Place non adherent silicone layer dressing (Adaptic touch) over bases of wound, cover with Aquacel AG hydrofiber sheet, cover with ABD pad and kerlix, secure with paper tape. Change daily or PRN if soiled.    #Hx of CVA   #HTN  #CHF; chronic   #Afib; chronic   - no deficits noted   - continue statin, metoprolol, ASA  - Eliquis restarted     #COPD  - no home o2, but patient reports he uses oxygen cans OTC he buys from store   - continue albuterol prn  - continue spiriva, symbicort     #IRINEO 2/2 CKD3   - Cr. 1.80, around baseline   - trend Creat  -avoid nephrotoxins    #Hypernatremia; resolved  #Hypokalemia; sec. to Lasix  -pt had K repleted, cont to monitor bmp    #DVT ppx:  Eliquis restarted    GOC - full code     Dispo:  called Daughter Salena, voicemail full--unable to leave a message.

## 2024-02-09 NOTE — DISCHARGE NOTE PROVIDER - HOSPITAL COURSE
Hospital Course  89 year old male with PMH of afib on Eliquis, CHF (EF 45-50%, combined systolic and diastolic), CVA, HTN, COPD, CKD3, multiple hospitalizations for CHF exacerbation in the prior months. Patient also recently had a left sided pleural effusion that was drained last hospitalization.  CXR on this admission w/ large effusion. Patient to be admitted for CHF exacerbation and acute on chronic hypoxic respiratory failure 2/2  large left pleural effusion.   He had an Echo on 2/7: EF 55-60%, pulm htn, mod mitral stenosis. He was evaluated by Pulmonary and had Thoracentesis on 12/8, 1 L drained, bloody per Dr. Ortez.  The rec is for Pleurex cath and possible Pleural bx.  Pt is agreeable for procedure. He was placed on IV lasix for diuresis.  He was evaluated by Cardiology.   Pt with 2.5 x 1.4 cm right retroareolar breast nodule larger than 9/19/2023.   Surgery consult referred and SONO was ordered of right breast.  Also with left lower ext blister, which is chronic.  Wound care instructions from last hospitalization: Clean with NS (clean in circular motions with NS and gauze). Pat dry. Apply liquid barrier film to periwound skin, allow to dry. Place non adherent silicone layer dressing (Adaptic touch) over bases of wound, cover with Aquacel AG hydrofiber sheet, cover with ABD pad and kerlix, secure with paper tape.   Pt also with IRINEO 2/2 CKD3 on this admission, with Hypernatremia and Hypokalemia, K was repleted.     Pt. to be transferred to [facility] for Pleurex cath and bx.     Palliative Care / Advanced Care Planning  Code Status:  FULL CODE  Patient/Family agreeable to Hospice/Palliative (Y/N)?  Summary of Goals of Care Conversation:    Discharging Provider:  ARMAAN Ruiz  Contact Info: Cell [phone] - Please call with any questions or concerns.         Hospital Course  89 year old male with PMH of afib on Eliquis, CHF (EF 45-50%, combined systolic and diastolic), CVA, HTN, COPD, CKD3, multiple hospitalizations for CHF exacerbation in the prior months. Patient also recently had a left sided pleural effusion that was drained last hospitalization.  CXR on this admission w/ large effusion. Patient to be admitted for CHF exacerbation and acute on chronic hypoxic respiratory failure 2/2  large left pleural effusion.  He had an Echo on 2/7: EF 55-60%, pulm htn, mod mitral stenosis. He was evaluated by Pulmonary and had Thoracentesis on 2/8, 1 L drained, bloody per Dr. Ortez.  The rec is for Pleurex cath and possible Pleural bx.  Pt is agreeable for procedure. He was placed on IV lasix for diuresis.  He was evaluated by Cardiology.   Pt with 2.5 x 1.4 cm right retroareolar breast nodule larger than 9/19/2023.   Surgery consult referred and plan to follow up outpatient. Also with left lower ext blister, which is chronic.  Wound care instructions from last hospitalization: Clean with NS (clean in circular motions with NS and gauze). Pat dry. Apply liquid barrier film to periwound skin, allow to dry. Place non adherent silicone layer dressing (Adaptic touch) over bases of wound, cover with Aquacel AG hydrofiber sheet, cover with ABD pad and kerlix, secure with paper tape.  Pt also with IRINEO 2/2 CKD3 on this admission, with Hypernatremia and Hypokalemia, K was repleted. Hemodynamically stable for transfer to [facility].     Pt. to be transferred to [facility] for Pleurex cath and bx     Palliative Care / Advanced Care Planning  Code Status:  FULL CODE      Discharging Provider:  Trav Pennington NP  Contact Info: Cell [phone]- Please call with any questions or concerns.         Hospital Course  89 year old male with PMH of afib on Eliquis, CHF (EF 45-50%, combined systolic and diastolic), CVA, HTN, COPD, CKD3, multiple hospitalizations for CHF exacerbation in the prior months. Patient also recently had a left sided pleural effusion that was drained last hospitalization.  CXR on this admission w/ large effusion. Patient to be admitted for CHF exacerbation and acute on chronic hypoxic respiratory failure 2/2  large left pleural effusion.  He had an Echo on 2/7: EF 55-60%, pulm htn, mod mitral stenosis. He was evaluated by Pulmonary and had Thoracentesis on 2/8, 1 L drained, bloody per Dr. Ortez.  The rec is for Pleurex cath and possible Pleural bx.  Pt is agreeable for procedure. He was placed on IV lasix for diuresis.  He was evaluated by Cardiology.   Pt with 2.5 x 1.4 cm right retroareolar breast nodule larger than 9/19/2023.   Surgery consult referred and plan to follow up outpatient. Also with left lower ext blister, which is chronic.  Wound care instructions from last hospitalization: Clean with NS (clean in circular motions with NS and gauze). Pat dry. Apply liquid barrier film to periwound skin, allow to dry. Place non adherent silicone layer dressing (Adaptic touch) over bases of wound, cover with Aquacel AG hydrofiber sheet, cover with ABD pad and kerlix, secure with paper tape.  Pt also with IRINEO 2/2 CKD3 on this admission, with Hypernatremia and Hypokalemia, K was repleted. Hemodynamically stable for transfer to [facility].     Patient transitioned from eliquis to heparin gtt during stay due to valvular heart disease, unable to use lovenox due to renal function. Per cardiology: given moderate mitral stenosis the patient would likely benefit from coumadin       Pt. to be transferred to [facility] for Pleurex cath and bx     Palliative Care / Advanced Care Planning  Code Status:  FULL CODE      Discharging Provider:  Trav Pennington NP  Contact Info: Cell [phone]- Please call with any questions or concerns.

## 2024-02-09 NOTE — PROGRESS NOTE ADULT - ASSESSMENT
Physical Examination:  GENERAL:               Alert, Oriented, No acute distress.    HEENT:                   No JVD, Moist MM  PULM:                     Bilateral air entry, diminished to ausciltion on left , no significant sputum production, No Rales, No Rhonchi, No Wheezing  CVS:                         S1, S2,  +murmur  ABD:                        Soft, nondistended, nontender, normoactive bowel sounds,   EXT:                         +edema, nontender, No Cyanosis or Clubbing   NEURO:                  Alert, oriented, interactive, nonfocal, follows commands  PSYC:                      Calm, + Insight.         Assessment  Dyspnea suspect due to acute on chronic Effusion Left  underlying Diastolic CHF possible  Chronic Left pleural effusions  s/p thoracentesis 11/24/23, 2/8/24- Exudate   Abnormal CT 2.5 x 1.4 cm right retroareolar breast nodule larger than 9/19/2023. Stable prominent mediastinal lymph nodes. Redemonstrated asymmetrical gynecomastia.      Afib on a/c  Cigar smoker, at risk for copd.   Worsening renal function on CKD    Plan  pleural effusion recurrent, Exudative and serosanguinous with previously negative cytology  f/u repeat cyto results    Consider surgery eval for breast nodule; of note patient states sister had breast cancer.     f/u Results  can restart a/c tonight  will need Pleurex and pleural biopsy.   Recommend ultrasound evaluation and surgery consultation.       will need to transfer for pleurex/pleural biopsy

## 2024-02-09 NOTE — PROGRESS NOTE ADULT - SUBJECTIVE AND OBJECTIVE BOX
Follow-up Pulmonary Progress Note  Chief Complaint : Heart failure          No new events overnight.  Denies SOB/CP.       Allergies :No Known Allergies      PAST MEDICAL & SURGICAL HISTORY:  Afib    Congestive heart failure (CHF)    CVA (cerebral vascular accident)    Hypertension, unspecified type    Chronic obstructive pulmonary disease (COPD)    No significant past surgical history        Medications:  MEDICATIONS  (STANDING):  allopurinol 100 milliGRAM(s) Oral daily  apixaban 2.5 milliGRAM(s) Oral every 12 hours  aspirin enteric coated 81 milliGRAM(s) Oral daily  atorvastatin 40 milliGRAM(s) Oral at bedtime  budesonide 160 MICROgram(s)/formoterol 4.5 MICROgram(s) Inhaler 2 Puff(s) Inhalation two times a day  furosemide   Injectable 40 milliGRAM(s) IV Push daily  metoprolol succinate ER 50 milliGRAM(s) Oral daily  pantoprazole    Tablet 40 milliGRAM(s) Oral before breakfast  petrolatum white Ointment 1 Application(s) Topical three times a day  polyethylene glycol 3350 17 Gram(s) Oral two times a day  tiotropium 2.5 MICROgram(s) Inhaler 2 Puff(s) Inhalation daily  triamcinolone 0.1% Oral Paste 1 Application(s) Topical every 12 hours    MEDICATIONS  (PRN):  acetaminophen     Tablet .. 650 milliGRAM(s) Oral every 6 hours PRN Mild Pain (1 - 3)  albuterol    90 MICROgram(s) HFA Inhaler 2 Puff(s) Inhalation every 6 hours PRN Shortness of Breath and/or Wheezing  aluminum hydroxide/magnesium hydroxide/simethicone Suspension 30 milliLiter(s) Oral every 4 hours PRN Dyspepsia  melatonin 3 milliGRAM(s) Oral at bedtime PRN Insomnia  ondansetron Injectable 4 milliGRAM(s) IV Push every 8 hours PRN Nausea and/or Vomiting      Antibiotics History      Heme Medications   apixaban 2.5 milliGRAM(s) Oral every 12 hours, 02-09-24 @ 11:18  aspirin enteric coated 81 milliGRAM(s) Oral daily, 02-06-24 @ 18:13      GI Medications  aluminum hydroxide/magnesium hydroxide/simethicone Suspension 30 milliLiter(s) Oral every 4 hours, 02-06-24 @ 18:44, Routine PRN  pantoprazole    Tablet 40 milliGRAM(s) Oral before breakfast, 02-06-24 @ 18:17, Routine  polyethylene glycol 3350 17 Gram(s) Oral two times a day, 02-06-24 @ 18:16, Routine        LABS:                        10.1   9.74  )-----------( 166      ( 09 Feb 2024 06:35 )             32.2     02-09    144  |  102  |  54<H>  ----------------------------<  109<H>  3.7   |  36<H>  |  1.80<H>    Ca    8.5      09 Feb 2024 06:35  Mg     2.4     02-09      Fluid characteristics  -- 02-08 @ 12:40  pH 7.4    tprot 5.0    Cell count  Appearance Bloody  Fluid type --  BF lymph 57  color Red  eosinophil 1  PMN --  Mesothelial 8  Monocyte 29  Other body cells --      Lights Criteria :    Trend LDH- Fluid  02-08-24 @ 12:40  142 -- --  12-19-23 @ 13:46  191 -- --  11-24-23 @ 11:59  126 -- --      Trend LDH - Serum  02-09-24 @ 06:35  173 [50 - 242]  12-20-23 @ 05:57  274<H> [50 - 242]  11-25-23 @ 05:45  215 [50 - 242]    _____________________  Trend Protein - Fluid   02-08-24 @ 12:40  5.0  --  --  12-19-23 @ 13:46  5.2  --  --  11-24-23 @ 11:59  4.1  --  --      Trend Protein - Serum  02-07-24 @ 06:49  7.5 [6.0 - 8.3]  02-06-24 @ 16:22  8.2 [6.0 - 8.3]  01-05-24 @ 06:39  7.0 [6.0 - 8.3]  01-04-24 @ 07:46  7.3 [6.0 - 8.3]  01-03-24 @ 06:46  7.3 [6.0 - 8.3]  01-02-24 @ 08:51  7.7 [6.0 - 8.3]  01-02-24 @ 07:15  7.6 [6.0 - 8.3]  01-01-24 @ 06:30  7.5 [6.0 - 8.3]  12-30-23 @ 07:30  7.2 [6.0 - 8.3]  12-29-23 @ 07:13  7.3 [6.0 - 8.3]  12-28-23 @ 05:00  7.2 [6.0 - 8.3]  12-27-23 @ 05:33  7.1 [6.0 - 8.3]      _____________________  The effusion is exudative if one of the Light’s criteria has been met:   1)  Pleural fluid protein/serum protein ratio > 0.5.   2)  Pleural fluid LDH/serum LDH ratio greater than > 0.6.   3) Pleural fluid LDH level greater than 2/3 of upper limit of normal LDH level in serum.  _____________________      CULTURES: (if applicable)    Culture - Body Fluid with Gram Stain (collected 02-08-24 @ 12:40)  Source: .Body Fluid Other, Pleural Fluid  Gram Stain (02-09-24 @ 00:58):    No polymorphonuclear leukocytes seen per low power field    No organisms seen per oil power field    Culture - Fungal, Body Fluid (collected 02-08-24 @ 12:40)  Source: Pleural Fl Pleural Fluid  Preliminary Report (02-09-24 @ 08:31):    Testing in progress      Rapid RVP Result: NotDetec (02-06-24 @ 18:22)        CAPILLARY BLOOD GLUCOSE          RADIOLOGY  CXR:      CT:    ECHO:      VITALS:  T(C): 36.4 (02-09-24 @ 04:50), Max: 36.7 (02-08-24 @ 13:39)  T(F): 97.6 (02-09-24 @ 04:50), Max: 98 (02-08-24 @ 13:39)  HR: 58 (02-09-24 @ 11:14) (58 - 87)  BP: 122/76 (02-09-24 @ 11:14) (99/51 - 138/54)  BP(mean): --  ABP: --  ABP(mean): --  RR: 19 (02-09-24 @ 04:50) (17 - 19)  SpO2: 94% (02-09-24 @ 11:14) (92% - 97%)  CVP(mm Hg): --  CVP(cm H2O): --    Ins and Outs     02-08-24 @ 07:01  -  02-09-24 @ 07:00  --------------------------------------------------------  IN: 0 mL / OUT: 1100 mL / NET: -1100 mL        Height (cm): 167.6 (02-06-24 @ 22:00)  Weight (kg): 70.3 (02-06-24 @ 22:00)  BMI (kg/m2): 25 (02-06-24 @ 22:00)        I&O's Detail    08 Feb 2024 07:01  -  09 Feb 2024 07:00  --------------------------------------------------------  IN:  Total IN: 0 mL    OUT:    Drain (mL): 1000 mL    Voided (mL): 100 mL  Total OUT: 1100 mL    Total NET: -1100 mL          Physical Examination:  GENERAL:               Alert, Oriented, No acute distress.    HEENT:                    Pupils equal, reactive to light.  Symmetric. No JVD, Moist MM  PULM:                     Bilateral air entry, Clear to auscultation bilaterally, no significant sputum production, No Rales, No Rhonchi, No Wheezing  CVS:                         S1, S2,  No Murmur  ABD:                        Soft, nondistended, nontender, normoactive bowel sounds,   EXT:                         No edema, nontender, No Cyanosis or Clubbing   Vascular:                Warm Extremities, Normal Capillary refill, Normal Distal Pulses  SKIN:                       Warm and well perfused, no rashes noted.   NEURO:                  Alert, oriented, interactive, nonfocal, follows commands  PSYC:                      Calm, + Insight.

## 2024-02-09 NOTE — DISCHARGE NOTE PROVIDER - CARE PROVIDER_API CALL
Gasper Puente  Internal Medicine  [address] [place] [address], [facility] [address]  [place] [address]  Phone: [phone]  Fax: [phone]  Follow Up Time:

## 2024-02-09 NOTE — PROGRESS NOTE ADULT - NS ATTEND AMEND GEN_ALL_CORE FT
Patient appears comfortable     physical exam unchanged    for transfer to [facility] for pleurex when bed available

## 2024-02-09 NOTE — OCCUPATIONAL THERAPY INITIAL EVALUATION ADULT - GENERAL OBSERVATIONS, REHAB EVAL
MD orders received. Chart reviewed, conferred with RN. Pt received seated in bedside chair, NAD, VSS, +telemonitor, +2L O2 via NC. Pt tolerated OT initial evaluation, treatment plan and goals discussed and agreed upon. Pt left seated in bedside chair, +chair alarm activated,  NAD, VSS, all lines intact, and call bell in reach.

## 2024-02-09 NOTE — CONSULT NOTE ADULT - PROBLEM SELECTOR RECOMMENDATION 9
recommend  US and Mammogram can be done outpt  Breast surgeon evaluation at [facility] or [facility]  Imaging and evaluation should not delay transfer for PleurX  discussed with Surgeon

## 2024-02-09 NOTE — DISCHARGE NOTE PROVIDER - NSDCMRMEDTOKEN_GEN_ALL_CORE_FT
acetaminophen 325 mg oral tablet: 2 tab(s) orally every 6 hours As needed Temp greater or equal to 38C (100.4F), Mild Pain (1 - 3)  albuterol 90 mcg/inh inhalation aerosol: 2 puff(s) inhaled every 6 hours As needed Shortness of Breath and/or Wheezing  allopurinol 100 mg oral tablet: 1 tab(s) orally once a day  apixaban 2.5 mg oral tablet: 1 tab(s) orally every 12 hours  aspirin 81 mg oral delayed release tablet: 1 tab(s) orally once a day  atorvastatin 40 mg oral tablet: 1 tab(s) orally once a day (at bedtime)  budesonide-formoterol 160 mcg-4.5 mcg/inh inhalation aerosol: 2 puff(s) inhaled 2 times a day  Farxiga 10 mg oral tablet: 1 tab(s) orally once a day  melatonin 3 mg oral tablet: 1 tab(s) orally once a day (at bedtime)  metoprolol succinate 50 mg oral tablet, extended release: 1 tab(s) orally once a day  pantoprazole 40 mg oral delayed release tablet: 1 tab(s) orally once a day (before a meal)  petrolatum topical ointment: Apply topically to affected area 3 times a day Apply topically to buttocks area and below back of leg three times a day MDD: 3 apps  polyethylene glycol 3350 oral powder for reconstitution: 17 gram(s) orally 2 times a day  senna leaf extract oral tablet: 2 tab(s) orally once a day (at bedtime)  tiotropium 2.5 mcg/inh inhalation aerosol: 2 puff(s) inhaled once a day  torsemide 100 mg oral tablet: 1 tab(s) orally once a day  triamcinolone 0.1% topical ointment: Apply topically to affected area 3 times a day Apply topically to bumps on legs two times a day MDD: 3 apps  triamcinolone 0.1% topical ointment: 1 Apply topically to affected area every 12 hours   acetaminophen 325 mg oral tablet: 2 tab(s) orally every 6 hours As needed Mild Pain (1 - 3)  albuterol 90 mcg/inh inhalation aerosol: 2 puff(s) inhaled every 6 hours As needed Shortness of Breath and/or Wheezing  allopurinol 100 mg oral tablet: 1 tab(s) orally once a day  aluminum hydroxide-magnesium hydroxide 200 mg-200 mg/5 mL oral suspension: 30 milliliter(s) orally every 4 hours As needed Dyspepsia  apixaban 2.5 mg oral tablet: 1 tab(s) orally every 12 hours  aspirin 81 mg oral delayed release tablet: 1 tab(s) orally once a day  atorvastatin 40 mg oral tablet: 1 tab(s) orally once a day (at bedtime)  budesonide-formoterol 160 mcg-4.5 mcg/inh inhalation aerosol: 2 puff(s) inhaled 2 times a day  furosemide 100 mg/100 mL-0.9% intravenous solution: 40 milliliter(s) intravenous once a day  melatonin 3 mg oral tablet: 1 tab(s) orally once a day (at bedtime) As needed Insomnia  metoprolol succinate 50 mg oral tablet, extended release: 1 tab(s) orally once a day  pantoprazole 40 mg oral delayed release tablet: 1 tab(s) orally once a day (before a meal)  petrolatum topical ointment: 1 Apply topically to affected area 3 times a day  polyethylene glycol 3350 oral powder for reconstitution: 17 gram(s) orally 2 times a day  senna leaf extract oral tablet: 2 tab(s) orally once a day (at bedtime)  tiotropium 2.5 mcg/inh inhalation aerosol: 2 puff(s) inhaled once a day  triamcinolone 0.1% topical ointment: Apply topically to affected area 3 times a day Apply topically to bumps on legs two times a day MDD: 3 apps

## 2024-02-09 NOTE — OCCUPATIONAL THERAPY INITIAL EVALUATION ADULT - ADDITIONAL COMMENTS
Information obtained per medical chart & pt during initial OT evaluation 2/2 pt unclear in reporting PLOF/home set-up - OT to confirm with SW to clarify/gain further details. Pt lives in  with ex wife, daughter, and son in law, 1 ANDIE, 1st floor set-up once inside, +stall shower/glass door. Per pt, PTA pt independent in functional mobility/transfers use of RW, assist for ADLS/IADLS from family as needed.

## 2024-02-09 NOTE — OCCUPATIONAL THERAPY INITIAL EVALUATION ADULT - PRECAUTIONS/LIMITATIONS, REHAB EVAL
diet- renal restrictions , 2L O2 via NC/cardiac precautions/fall precautions/oxygen therapy device and L/min

## 2024-02-09 NOTE — CONSULT NOTE ADULT - SUBJECTIVE AND OBJECTIVE BOX
89 yom PMH of Atrial fibrillation on Eliquis, Peripheral arterial disease, Cardiomyopathy, CVA, HTN, COPD, CKD3, multiple hospitalizations for CHF exacerbation in the prior months presents with shortness of breath. Patient also recently had a left sided pleural effusion that was drained last hospitalization. Patient presents to [place] ED today with shortness of breath and left sided chest pressure which has been progressing over the past week. Patient states that he has been compliant with medications, and no recent illness. Health care proxy at bedside states he has been complaining of right nipple pain for the past 2 months, denies any nipple discharge, denies chest pain. Pt awaiting transfer to [facility] for PleurX.      PAST MEDICAL & SURGICAL HISTORY:  Afib      Congestive heart failure (CHF)      CVA (cerebral vascular accident)      Hypertension, unspecified type      Chronic obstructive pulmonary disease (COPD)      No significant past surgical history      MEDICATIONS  (STANDING):  allopurinol 100 milliGRAM(s) Oral daily  apixaban 2.5 milliGRAM(s) Oral every 12 hours  aspirin enteric coated 81 milliGRAM(s) Oral daily  atorvastatin 40 milliGRAM(s) Oral at bedtime  budesonide 160 MICROgram(s)/formoterol 4.5 MICROgram(s) Inhaler 2 Puff(s) Inhalation two times a day  furosemide   Injectable 40 milliGRAM(s) IV Push daily  metoprolol succinate ER 50 milliGRAM(s) Oral daily  pantoprazole    Tablet 40 milliGRAM(s) Oral before breakfast  petrolatum white Ointment 1 Application(s) Topical three times a day  polyethylene glycol 3350 17 Gram(s) Oral two times a day  tiotropium 2.5 MICROgram(s) Inhaler 2 Puff(s) Inhalation daily  triamcinolone 0.1% Oral Paste 1 Application(s) Topical every 12 hours    MEDICATIONS  (PRN):  acetaminophen     Tablet .. 650 milliGRAM(s) Oral every 6 hours PRN Mild Pain (1 - 3)  albuterol    90 MICROgram(s) HFA Inhaler 2 Puff(s) Inhalation every 6 hours PRN Shortness of Breath and/or Wheezing  aluminum hydroxide/magnesium hydroxide/simethicone Suspension 30 milliLiter(s) Oral every 4 hours PRN Dyspepsia  melatonin 3 milliGRAM(s) Oral at bedtime PRN Insomnia  ondansetron Injectable 4 milliGRAM(s) IV Push every 8 hours PRN Nausea and/or Vomiting                          10.1   9.74  )-----------( 166      ( 09 Feb 2024 06:35 )             32.2   02-09    144  |  102  |  54<H>  ----------------------------<  109<H>  3.7   |  36<H>  |  1.80<H>    Ca    8.5      09 Feb 2024 06:35  Mg     2.4     02-09

## 2024-02-09 NOTE — DISCHARGE NOTE PROVIDER - NSDCCPCAREPLAN_GEN_ALL_CORE_FT
PRINCIPAL DISCHARGE DIAGNOSIS  Diagnosis: CHF exacerbation  Assessment and Plan of Treatment:       SECONDARY DISCHARGE DIAGNOSES  Diagnosis: Pleural effusion  Assessment and Plan of Treatment:     Diagnosis: Breast mass  Assessment and Plan of Treatment:      PRINCIPAL DISCHARGE DIAGNOSIS  Diagnosis: CHF exacerbation  Assessment and Plan of Treatment: you were treated with IV lasix, please continue to take medications as prescribed   you recieved an echocardiogram for further cardiac workup      SECONDARY DISCHARGE DIAGNOSES  Diagnosis: Pleural effusion  Assessment and Plan of Treatment: you will be transferred to [facility] for a Pleurx catheter    Diagnosis: Breast mass  Assessment and Plan of Treatment: as discussed, please follow up outpatient

## 2024-02-09 NOTE — PROGRESS NOTE ADULT - SUBJECTIVE AND OBJECTIVE BOX
Follow-up Pulmonary Progress Note  Chief Complaint : Heart failure      patient seen and examined  comfortable  on n/c  no cp, palp, cough        Allergies :No Known Allergies      PAST MEDICAL & SURGICAL HISTORY:  Afib    Congestive heart failure (CHF)    CVA (cerebral vascular accident)    Hypertension, unspecified type    Chronic obstructive pulmonary disease (COPD)    No significant past surgical history        Medications:  MEDICATIONS  (STANDING):  allopurinol 100 milliGRAM(s) Oral daily  apixaban 2.5 milliGRAM(s) Oral every 12 hours  aspirin enteric coated 81 milliGRAM(s) Oral daily  atorvastatin 40 milliGRAM(s) Oral at bedtime  budesonide 160 MICROgram(s)/formoterol 4.5 MICROgram(s) Inhaler 2 Puff(s) Inhalation two times a day  furosemide   Injectable 40 milliGRAM(s) IV Push daily  metoprolol succinate ER 50 milliGRAM(s) Oral daily  pantoprazole    Tablet 40 milliGRAM(s) Oral before breakfast  petrolatum white Ointment 1 Application(s) Topical three times a day  polyethylene glycol 3350 17 Gram(s) Oral two times a day  tiotropium 2.5 MICROgram(s) Inhaler 2 Puff(s) Inhalation daily  triamcinolone 0.1% Oral Paste 1 Application(s) Topical every 12 hours    MEDICATIONS  (PRN):  acetaminophen     Tablet .. 650 milliGRAM(s) Oral every 6 hours PRN Mild Pain (1 - 3)  albuterol    90 MICROgram(s) HFA Inhaler 2 Puff(s) Inhalation every 6 hours PRN Shortness of Breath and/or Wheezing  aluminum hydroxide/magnesium hydroxide/simethicone Suspension 30 milliLiter(s) Oral every 4 hours PRN Dyspepsia  melatonin 3 milliGRAM(s) Oral at bedtime PRN Insomnia  ondansetron Injectable 4 milliGRAM(s) IV Push every 8 hours PRN Nausea and/or Vomiting      Antibiotics History      Heme Medications   apixaban 2.5 milliGRAM(s) Oral every 12 hours, 02-09-24 @ 11:18  aspirin enteric coated 81 milliGRAM(s) Oral daily, 02-06-24 @ 18:13      GI Medications  aluminum hydroxide/magnesium hydroxide/simethicone Suspension 30 milliLiter(s) Oral every 4 hours, 02-06-24 @ 18:44, Routine PRN  pantoprazole    Tablet 40 milliGRAM(s) Oral before breakfast, 02-06-24 @ 18:17, Routine  polyethylene glycol 3350 17 Gram(s) Oral two times a day, 02-06-24 @ 18:16, Routine        LABS:                        10.1   9.74  )-----------( 166      ( 09 Feb 2024 06:35 )             32.2     02-09    144  |  102  |  54<H>  ----------------------------<  109<H>  3.7   |  36<H>  |  1.80<H>    Ca    8.5      09 Feb 2024 06:35  Mg     2.4     02-09      Fluid characteristics  -- 02-08 @ 12:40  pH 7.4    tprot 5.0    Cell count  Appearance Bloody  Fluid type --  BF lymph 57  color Red  eosinophil 1  PMN --  Mesothelial 8  Monocyte 29  Other body cells --    Lights Criteria :    Trend LDH- Fluid  02-08-24 @ 12:40  142 -- --  12-19-23 @ 13:46  191 -- --  11-24-23 @ 11:59  126 -- --      Trend LDH - Serum  02-09-24 @ 06:35  173 [50 - 242]  12-20-23 @ 05:57  274<H> [50 - 242]  11-25-23 @ 05:45  215 [50 - 242]    _____________________  Trend Protein - Fluid   02-08-24 @ 12:40  5.0  --  --  12-19-23 @ 13:46  5.2  --  --  11-24-23 @ 11:59  4.1  --  --      Trend Protein - Serum  02-07-24 @ 06:49  7.5 [6.0 - 8.3]  02-06-24 @ 16:22  8.2 [6.0 - 8.3]  01-05-24 @ 06:39  7.0 [6.0 - 8.3]  01-04-24 @ 07:46  7.3 [6.0 - 8.3]  01-03-24 @ 06:46  7.3 [6.0 - 8.3]  01-02-24 @ 08:51  7.7 [6.0 - 8.3]  01-02-24 @ 07:15  7.6 [6.0 - 8.3]  01-01-24 @ 06:30  7.5 [6.0 - 8.3]  12-30-23 @ 07:30  7.2 [6.0 - 8.3]  12-29-23 @ 07:13  7.3 [6.0 - 8.3]  12-28-23 @ 05:00  7.2 [6.0 - 8.3]  12-27-23 @ 05:33  7.1 [6.0 - 8.3]      _____________________  The effusion is exudative if one of the Light’s criteria has been met:   1)  Pleural fluid protein/serum protein ratio > 0.5.   2)  Pleural fluid LDH/serum LDH ratio greater than > 0.6.   3) Pleural fluid LDH level greater than 2/3 of upper limit of normal LDH level in serum.  _____________________          CULTURES: (if applicable)    Culture - Body Fluid with Gram Stain (collected 02-08-24 @ 12:40)  Source: .Body Fluid Other, Pleural Fluid  Gram Stain (02-09-24 @ 00:58):    No polymorphonuclear leukocytes seen per low power field    No organisms seen per oil power field    Culture - Fungal, Body Fluid (collected 02-08-24 @ 12:40)  Source: Pleural Fl Pleural Fluid  Preliminary Report (02-09-24 @ 08:31):    Testing in progress      Rapid RVP Result: NotDetec (02-06-24 @ 18:22)       RADIOLOGY  CXR:      CT:  < from: CT Chest No Cont (02.08.24 @ 14:40) >    ACC: [id number] EXAM:  CT CHEST   ORDERED BY: JEZ LOBO     PROCEDURE DATE:  02/08/2024          INTERPRETATION:  CLINICAL INFORMATION: 89 years  Male with recurrent   bloody effusion.    COMPARISON: Noncontrast chest CT 12/20/2023 and 9/19/2023    CONTRAST/COMPLICATIONS:  IV Contrast: NONE  Oral Contrast: NONE  Complications: None reported at time of study completion    PROCEDURE:  CT of the Chest was performed.  Sagittal and coronal reformats were performed.    LIMITATION: Evaluation of the vascular structures and yanique is limited by   lack of IV contrast.    FINDINGS:    LUNGS AND AIRWAYS: Patent central airways.  Lingular and left lower lobe   groundglass infiltrates. Bilateral lower lobe discoid atelectasis.  PLEURA: Small bilateralpleural effusions greater on the left. No   pneumothorax.  MEDIASTINUM AND YANIQUE: Stable prominent mediastinal lymph nodes measuring   up to 1.2 cm short axis, predominantly pretracheal and precarinal.  VESSELS: Aortic and coronary atherosclerosis.  HEART: Moderate cardiomegaly. Low attenuation cardiac blood pool   secondary to anemia. Mitral annular calcifications. No pericardial   effusion.  CHEST WALL AND LOWER NECK: 2.5 x 1.4 cm right retroareolar breast nodule   unchanged from prior but larger than 9/19/2023. Stable left gynecomastia.  VISUALIZED UPPER ABDOMEN: Bilateral renal cysts. Left renal cortical   scarring. Cholelithiasis.  BONES: Osteopenia. Mild degenerative changes.    IMPRESSION:    2.5 x 1.4 cm right retroareolar breast nodule larger than 9/19/2023.   Recommend ultrasound evaluation and surgery consultation.    Lingular and left lower lobe groundglass infiltrates likely pneumonia.    Small bilateral pleural effusions. Cardiomegaly.    Stable prominent mediastinal lymph nodes.    Redemonstrated asymmetrical gynecomastia.        --- End of Report ---      < end of copied text >    ECHO:      VITALS:  T(C): 36.4 (02-09-24 @ 04:50), Max: 36.7 (02-08-24 @ 13:39)  T(F): 97.6 (02-09-24 @ 04:50), Max: 98 (02-08-24 @ 13:39)  HR: 58 (02-09-24 @ 11:14) (58 - 79)  BP: 122/76 (02-09-24 @ 11:14) (99/51 - 138/54)  BP(mean): --  ABP: --  ABP(mean): --  RR: 19 (02-09-24 @ 04:50) (18 - 19)  SpO2: 94% (02-09-24 @ 11:14) (92% - 97%)  CVP(mm Hg): --  CVP(cm H2O): --    Ins and Outs     02-08-24 @ 07:01  -  02-09-24 @ 07:00  --------------------------------------------------------  IN: 0 mL / OUT: 1100 mL / NET: -1100 mL        Height (cm): 167.6 (02-06-24 @ 22:00)  Weight (kg): 70.3 (02-06-24 @ 22:00)  BMI (kg/m2): 25 (02-06-24 @ 22:00)        I&O's Detail    08 Feb 2024 07:01  -  09 Feb 2024 07:00  --------------------------------------------------------  IN:  Total IN: 0 mL    OUT:    Drain (mL): 1000 mL    Voided (mL): 100 mL  Total OUT: 1100 mL    Total NET: -1100 mL

## 2024-02-09 NOTE — PROGRESS NOTE ADULT - SUBJECTIVE AND OBJECTIVE BOX
RAD SINGLETON  [id number]    Chief Complaint: SOB  Interval events: pt seen and examined, labs and chart reviewed. denies chest pain, reports improvement in breathing. AF 60s on tele    ALLERGIES:  No Known Allergies    CURRENT MEDICATIONS:  MEDICATIONS  (STANDING):  allopurinol 100 milliGRAM(s) Oral daily  apixaban 2.5 milliGRAM(s) Oral every 12 hours  aspirin enteric coated 81 milliGRAM(s) Oral daily  atorvastatin 40 milliGRAM(s) Oral at bedtime  budesonide 160 MICROgram(s)/formoterol 4.5 MICROgram(s) Inhaler 2 Puff(s) Inhalation two times a day  furosemide   Injectable 40 milliGRAM(s) IV Push two times a day  metoprolol succinate ER 50 milliGRAM(s) Oral daily  pantoprazole    Tablet 40 milliGRAM(s) Oral before breakfast  petrolatum white Ointment 1 Application(s) Topical three times a day  polyethylene glycol 3350 17 Gram(s) Oral two times a day  potassium chloride    Tablet ER 40 milliEquivalent(s) Oral every 4 hours  tiotropium 2.5 MICROgram(s) Inhaler 2 Puff(s) Inhalation daily  triamcinolone 0.1% Oral Paste 1 Application(s) Topical every 12 hours    Social History:  prior pipe use    ROS:  All 10 systems reviewed and positives noted in HPI    OBJECTIVE:    VITAL SIGNS:  Vital Signs Last 24 Hrs  T(C): 36.7 (07 Feb 2024 04:40), Max: 36.7 (07 Feb 2024 04:40)  T(F): 98.1 (07 Feb 2024 04:40), Max: 98.1 (07 Feb 2024 04:40)  HR: 65 (07 Feb 2024 06:14) (60 - 77)  BP: 149/82 (07 Feb 2024 06:14) (144/69 - 176/73)  BP(mean): --  RR: 16 (07 Feb 2024 06:14) (14 - 18)  SpO2: 99% (07 Feb 2024 06:14) (90% - 99%)    Parameters below as of 07 Feb 2024 06:14  Patient On (Oxygen Delivery Method): nasal cannula  O2 Flow (L/min): 2      PHYSICAL EXAM:  General: well appearing, no distress  HEENT: sclera anicteric  Neck: supple  CVS: JVP ~ 7 cm H20, irregularly irregular  Chest: unlabored respirations, diminished sounds left  Abdomen: non-distended  Extremities: +2 b/l l/e edema  Neuro: awake, alert & oriented x 3  Psych: normal affect      LABS:                        10.6   7.30  )-----------( 171      ( 07 Feb 2024 06:49 )             33.9     02-07    148<H>  |  101  |  43<H>  ----------------------------<  93  2.9<LL>   |  38<H>  |  1.50<H>    Ca    9.0      07 Feb 2024 06:49    TPro  7.5  /  Alb  2.6<L>  /  TBili  0.7  /  DBili  x   /  AST  17  /  ALT  10  /  AlkPhos  68  02-07        PT/INR - ( 07 Feb 2024 06:49 )   PT: 17.7 sec;   INR: 1.57 ratio        < from: Xray Chest 1 View- PORTABLE-Urgent (02.06.24 @ 16:50) >  Heart quite enlarged.  There is a very large left effusion may be minimally improved compared to January 2.      < from: TTE Echo Complete w/o Contrast w/ Doppler (02.07.24 @ 07:49) >   1. Normal global left ventricular systolic function.   2. Left ventricular ejection fraction, by visual estimation, is 55 to   60%.   3. Calculated LVEF by Simpsons Biplane Method 57%.   4. Normal left ventricular internal cavity size.   5. There is mild septal left ventricular hypertrophy.   6. Severely enlarged left atrium.   7. Severely enlarged right atrium.   8. Rheumatic mitral valve.   9. Mild thickening and calcification of the anterior and posterior   mitral valve leaflets.  10. Moderate mitral valve stenosis (peak velocity 1.9m/s, mean gradient    8 mmHg at HR 53 BPM, MVA 1.0 cm^2).  11. Mild mitral valve regurgitation.  12. Mild-moderate tricuspid regurgitation.  13. Aortic sclerosis with borderline decreased opening.  14. Mild aortic regurgitation.  15. Estimated pulmonary artery systolic pressure is 53.9 mmHg assuming a   right atrial pressure of 15 mmHg, which is consistent with moderate   pulmonary hypertension.  16. Large pleural effusion in the left lateral region.

## 2024-02-09 NOTE — OCCUPATIONAL THERAPY INITIAL EVALUATION ADULT - PERTINENT HX OF CURRENT PROBLEM, REHAB EVAL
89 year old male with PMH of afib on Eliquis, b/l Le blisters, CHF (EF 45-50%, combined systolic and diastolic), CVA, HTN, COPD, CKD3, multiple hospitalizations for CHF exacerbation in the prior months. Patient also recently had a left sided pleural effusion that was drained last hospitalization. Patient presents to [place] ED today with shortness of breath and left sided chest pressure which has been progressing over the past week. Patient states that he has been compliant with medications, and no recent illness. Patient denies radiating pain from chest. Chest pain intermittent as per patient. Patient stated that he has noticed minor leg swelling. Patient endorses lethargy. Patient denies nausea, vomiting, and or cough. Patient uses OTC oxygen cans at home as needed. Chest xray performed in the ED displays an enlarged heart with large effusion. Patient to be admitted to medical service for CHF exacerbation.

## 2024-02-09 NOTE — DISCHARGE NOTE PROVIDER - NSDCFUSCHEDAPPT_GEN_ALL_CORE_FT
[address]  Scheduled Appointment: 03/25/2024    Gasper Puente  [address]  Scheduled Appointment: 03/25/2024

## 2024-02-09 NOTE — PROGRESS NOTE ADULT - SUBJECTIVE AND OBJECTIVE BOX
Indication for Geriatrics and Palliative Care Services/INTERVAL HPI: GOC    OVERNIGHT EVENTS: no acute events noted  SUBJECTIVE AND OBJECTIVE: Pt seen and examined at bedside this morning. He reports feeling better s/p thora yesterday. States his CP has improved - started over a month ago.     Allergies  No Known Allergies    Intolerances    MEDICATIONS  (STANDING):  allopurinol 100 milliGRAM(s) Oral daily  aspirin enteric coated 81 milliGRAM(s) Oral daily  atorvastatin 40 milliGRAM(s) Oral at bedtime  budesonide 160 MICROgram(s)/formoterol 4.5 MICROgram(s) Inhaler 2 Puff(s) Inhalation two times a day  furosemide   Injectable 40 milliGRAM(s) IV Push daily  metoprolol succinate ER 50 milliGRAM(s) Oral daily  pantoprazole    Tablet 40 milliGRAM(s) Oral before breakfast  petrolatum white Ointment 1 Application(s) Topical three times a day  polyethylene glycol 3350 17 Gram(s) Oral two times a day  tiotropium 2.5 MICROgram(s) Inhaler 2 Puff(s) Inhalation daily  triamcinolone 0.1% Oral Paste 1 Application(s) Topical every 12 hours    MEDICATIONS  (PRN):  acetaminophen     Tablet .. 650 milliGRAM(s) Oral every 6 hours PRN Mild Pain (1 - 3)  albuterol    90 MICROgram(s) HFA Inhaler 2 Puff(s) Inhalation every 6 hours PRN Shortness of Breath and/or Wheezing  aluminum hydroxide/magnesium hydroxide/simethicone Suspension 30 milliLiter(s) Oral every 4 hours PRN Dyspepsia  melatonin 3 milliGRAM(s) Oral at bedtime PRN Insomnia  ondansetron Injectable 4 milliGRAM(s) IV Push every 8 hours PRN Nausea and/or Vomiting      ITEMS UNCHECKED ARE NOT PRESENT    PRESENT SYMPTOMS:     Pain:   no  QOL impact -   Location -                    Aggravating factors -  Quality -  Radiation -  Timing-  Severity (0-10 scale):  Minimal acceptable level (0-10 scale):     Dyspnea:                            Mild    Anxiety:                            none  Fatigue:                             none  Nausea:                             none  Depressed:                        does not report  Loss of appetite:              none  Constipation:                    none      Other Symptoms:  [X ]All other review of systems negative     Chaplaincy Referral:  Deferred   Palliative Performance Status Version 2:     50    %      http://npcrc.org/files/news/palliative_performance_scale_ppsv2.pdf    PHYSICAL EXAM:  Vital Signs Last 24 Hrs  T(C): 36.4 (09 Feb 2024 04:50), Max: 36.7 (08 Feb 2024 13:39)  T(F): 97.6 (09 Feb 2024 04:50), Max: 98 (08 Feb 2024 13:39)  HR: 61 (09 Feb 2024 04:50) (61 - 87)  BP: 138/54 (09 Feb 2024 04:50) (99/51 - 138/54)  BP(mean): --  RR: 19 (09 Feb 2024 04:50) (17 - 19)  SpO2: 97% (09 Feb 2024 04:50) (92% - 97%)    Parameters below as of 09 Feb 2024 04:50  Patient On (Oxygen Delivery Method): room air     I&O's Summary    08 Feb 2024 07:01  -  09 Feb 2024 07:00  --------------------------------------------------------  IN: 0 mL / OUT: 1100 mL / NET: -1100 mL    GENERAL: sitting in chair eating breakfast in NAD  HEENT: NC/AT, MMM  PULMONARY: decreased breath sounds to L side, no wheezing  GASTROINTESTINAL: soft, nontender  Last BM: 2/8  MUSCULOSKELETAL: moves all extremities, no deformities noted  NEUROLOGIC: A&Ox3, appropriate affect  SKIN: see nursing assessment for further details      LABS:                        10.1   9.74  )-----------( 166      ( 09 Feb 2024 06:35 )             32.2   02-09    144  |  102  |  54<H>  ----------------------------<  109<H>  3.7   |  36<H>  |  1.80<H>    Ca    8.5      09 Feb 2024 06:35  Mg     2.4     02-09        Urinalysis Basic - ( 09 Feb 2024 06:35 )    Color: x / Appearance: x / SG: x / pH: x  Gluc: 109 mg/dL / Ketone: x  / Bili: x / Urobili: x   Blood: x / Protein: x / Nitrite: x   Leuk Esterase: x / RBC: x / WBC x   Sq Epi: x / Non Sq Epi: x / Bacteria: x      RADIOLOGY & ADDITIONAL STUDIES:    Protein Calorie Malnutrition Present: [ ]mild [ ]moderate [ ]severe [ ]underweight [ ]morbid obesity  https://www.andeal.org/vault/2440/web/files/ONC/Table_Clinical%20Characteristics%20to%20Document%20Malnutrition-White%20JV%20et%20al%454723.pdf    Height (cm): 167.6 (02-06-24 @ 22:00), 167.6 (12-18-23 @ 09:52), 167.6 (11-22-23 @ 17:35)  Weight (kg): 70.3 (02-06-24 @ 22:00), 74.6 (01-04-24 @ 08:00), 70.3 (12-18-23 @ 09:52)  BMI (kg/m2): 25 (02-06-24 @ 22:00), 26.6 (01-04-24 @ 08:00), 25 (12-18-23 @ 09:52)    [ ]PPSV2 < or = 30%  [ ]significant weight loss [ ]poor nutritional intake [ ]anasarca[ ]Artificial Nutrition    Other REFERRALS:  [ ]Hospice  [ ]Child Life  [ ]Social Work  [ ]Case management [ ]Holistic Therapy

## 2024-02-09 NOTE — PROGRESS NOTE ADULT - ASSESSMENT
Assessment: 89 year old male with PMH of Atrial fibrillation on Eliquis, Peripheral arterial disease, Cardiomyopathy, CVA, HTN, COPD, CKD3, multiple hospitalizations for CHF exacerbation in the prior months presents with shortness of breath. Patient states he is compliant with his medications and has an aide administer them everyday. Cardiology consulted for CHF exacerbation    Recommendations  #Heart Failure, SOB  -Patient uses O2 at home as needed, currently on room air  -CXR showed large left pleural effusion  -Continuous cardiac monitoring to r/o arrhythmias  -Continue iv diuresis, monitor electrolytes and kidney function, goal k>4, mg>2. if renal function improves, may consider initiating gdmt, ace/arb/arni, farxiga, continue metoprolol and eliquis at this time  -Daily weight monitoring, Strict I/O, Low sodium DASH diet  - TTE with EF 55-60, biatrial enlargement, Mod mitral stenosis and mod pulm htn. large left pleural effusion again seen. pulm consulted, pt s/p thoracentesis 2/8. resume eliquis post procedure as per pulm  - possible plan for pleurx and pleural biopsy    Carlotta Sandoval [facility]-BC

## 2024-02-09 NOTE — PHYSICAL THERAPY INITIAL EVALUATION ADULT - PERTINENT HX OF CURRENT PROBLEM, REHAB EVAL
89 year old male with PMH of afib on Eliquis, b/l Le blisters, CHF (EF 45-50%, combined systolic and diastolic), CVA, HTN, COPD, CKD3, multiple hospitalizations for CHF exacerbation in the prior months. Patient also recently had a left sided pleural effusion that was drained last hospitalization. Patient presents to [place] ED today with shortness of breath and left sided chest pressure which has been progressing over the past week. Patient states that he has been compliant with medications, and no recent illness. Patient denies radiating pain from chest. Chest pain intermittent as per patient. Patient stated that he has noticed minor leg swelling. Patient endorses lethargy. Patient denies nausea, vomiting, and or cough. Patient uses OTC oxygen cans at home as needed. Chest xray performed in the ED displays an enlarged heart with large effusion. Patient to be admitted to medical service for CHF exacerbation.

## 2024-02-10 NOTE — PROGRESS NOTE ADULT - ASSESSMENT
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), Moderate mitral valve stenosis, Hypertension, Chronic kidney disease, COPD and history of CVA with recurrent hospitalizations for congestive heart failure likely from medication noncompliance and recent hospitalization at [facility] last month for congestive heart failure exacerbation deemed not to require chest tube placement at the time, also with moderate to severe PAD - deemed to be poor surgical candidate for lower extremity vascular intervention, now presents with progressive dyspnea, found to have acute on chronic diastolic heart failure exacerbation with large left pleural effusion.    ECG consisent with atrial fibrillation. Troponins negative x 2, does not appear to be an acute coronary syndrome. 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 The patient is now s/p thoracentesis with 1 L fluid removed.    Recommendations:  [] Acute on chronic diastolic heart failure exacerbation with chronic pleural effusions: S/p thoracentesis, patient awaiting bed availability at [facility] for Pleurx catheter. Continue IV diuresis, patient currently net negative 1.3 L. Continue Metoprolol succinate 50 mg daily. Plan to resume SGLT2-I when euvolemic. Follow up fluid studies and Pulmonary.  [] Moderate mitral valve stenosis: The patient would appear to be a poor candidate for MV PMBC or surgical repair at this age and also due to comorbidities, can obtain structural heart team evaluation while at [facility], likely outpatient management.   [] Atrial fibrillation: Rate controlled, continue beta blocker. Continue Eliquis for stroke prophylaxis    Patient awaiting bed availability at [facility] for Pleurx catheter.     Kevin Hong MD  Cardiology

## 2024-02-10 NOTE — PROGRESS NOTE ADULT - SUBJECTIVE AND OBJECTIVE BOX
Patient is a 89y old  Male who presents with a chief complaint of Shortness of breath (10 Feb 2024 09:07)      Patient seen and examined at bedside. No overnight events reported. Patient examined sitting up in chair     ALLERGIES:  No Known Allergies    MEDICATIONS  (STANDING):  allopurinol 100 milliGRAM(s) Oral daily  apixaban 2.5 milliGRAM(s) Oral every 12 hours  aspirin enteric coated 81 milliGRAM(s) Oral daily  atorvastatin 40 milliGRAM(s) Oral at bedtime  budesonide 160 MICROgram(s)/formoterol 4.5 MICROgram(s) Inhaler 2 Puff(s) Inhalation two times a day  furosemide   Injectable 40 milliGRAM(s) IV Push daily  metoprolol succinate ER 50 milliGRAM(s) Oral daily  pantoprazole    Tablet 40 milliGRAM(s) Oral before breakfast  petrolatum white Ointment 1 Application(s) Topical three times a day  polyethylene glycol 3350 17 Gram(s) Oral two times a day  potassium chloride    Tablet ER 40 milliEquivalent(s) Oral once  tiotropium 2.5 MICROgram(s) Inhaler 2 Puff(s) Inhalation daily  triamcinolone 0.1% Oral Paste 1 Application(s) Topical every 12 hours    MEDICATIONS  (PRN):  acetaminophen     Tablet .. 650 milliGRAM(s) Oral every 6 hours PRN Mild Pain (1 - 3)  albuterol    90 MICROgram(s) HFA Inhaler 2 Puff(s) Inhalation every 6 hours PRN Shortness of Breath and/or Wheezing  aluminum hydroxide/magnesium hydroxide/simethicone Suspension 30 milliLiter(s) Oral every 4 hours PRN Dyspepsia  melatonin 3 milliGRAM(s) Oral at bedtime PRN Insomnia  ondansetron Injectable 4 milliGRAM(s) IV Push every 8 hours PRN Nausea and/or Vomiting    Vital Signs Last 24 Hrs  T(F): 97.4 (10 Feb 2024 05:00), Max: 98.3 (09 Feb 2024 20:04)  HR: 86 (10 Feb 2024 05:00) (58 - 87)  BP: 141/68 (10 Feb 2024 05:00) (111/64 - 141/68)  RR: 18 (10 Feb 2024 05:00) (18 - 18)  SpO2: 97% (10 Feb 2024 07:44) (86% - 97%)  I&O's Summary    10 Feb 2024 07:01  -  10 Feb 2024 09:54  --------------------------------------------------------  IN: 0 mL / OUT: 400 mL / NET: -400 mL      PHYSICAL EXAM:  General: NAD, A/O x 3  ENT: No gross hearing impairment, Moist mucous membranes, no thrush  Neck: Supple, No JVD  Lungs: diminished to auscultation bilaterally, good air entry, non-labored breathing  Cardio: RRR, S1/S2, No murmur  Abdomen: Soft, Nontender, Nondistended; Bowel sounds present  Extremities: No calf tenderness, No cyanosis, 1+ edema   Psych: Appropriate mood and affect    LABS:                        10.2   8.08  )-----------( 156      ( 10 Feb 2024 06:55 )             32.6     02-10    142  |  100  |  53  ----------------------------<  134  3.4   |  34  |  1.77    Ca    8.7      10 Feb 2024 06:55  Mg     2.5     02-10                      02-07 Chol 103 mg/dL LDL -- HDL 48 mg/dL Trig 61 mg/dL                  Urinalysis Basic - ( 10 Feb 2024 06:55 )    Color: x / Appearance: x / SG: x / pH: x  Gluc: 134 mg/dL / Ketone: x  / Bili: x / Urobili: x   Blood: x / Protein: x / Nitrite: x   Leuk Esterase: x / RBC: x / WBC x   Sq Epi: x / Non Sq Epi: x / Bacteria: x        Culture - Body Fluid with Gram Stain (collected 08 Feb 2024 12:40)  Source: .Body Fluid Other, Pleural Fluid  Gram Stain (09 Feb 2024 00:58):    No polymorphonuclear leukocytes seen per low power field    No organisms seen per oil power field  Preliminary Report (09 Feb 2024 16:08):    No growth to date.    Culture - Fungal, Body Fluid (collected 08 Feb 2024 12:40)  Source: Pleural Fl Pleural Fluid  Preliminary Report (09 Feb 2024 08:31):    Testing in progress        RADIOLOGY & ADDITIONAL TESTS:    Care Discussed with Consultants/Other Providers:    Patient is a 89y old  Male who presents with a chief complaint of Shortness of breath (10 Feb 2024 09:07)      Patient seen and examined at bedside. No overnight events reported. Patient examined sitting up in chair     ALLERGIES:  No Known Allergies    MEDICATIONS  (STANDING):  allopurinol 100 milliGRAM(s) Oral daily  apixaban 2.5 milliGRAM(s) Oral every 12 hours  aspirin enteric coated 81 milliGRAM(s) Oral daily  atorvastatin 40 milliGRAM(s) Oral at bedtime  budesonide 160 MICROgram(s)/formoterol 4.5 MICROgram(s) Inhaler 2 Puff(s) Inhalation two times a day  furosemide   Injectable 40 milliGRAM(s) IV Push daily  metoprolol succinate ER 50 milliGRAM(s) Oral daily  pantoprazole    Tablet 40 milliGRAM(s) Oral before breakfast  petrolatum white Ointment 1 Application(s) Topical three times a day  polyethylene glycol 3350 17 Gram(s) Oral two times a day  potassium chloride    Tablet ER 40 milliEquivalent(s) Oral once  tiotropium 2.5 MICROgram(s) Inhaler 2 Puff(s) Inhalation daily  triamcinolone 0.1% Oral Paste 1 Application(s) Topical every 12 hours    MEDICATIONS  (PRN):  acetaminophen     Tablet .. 650 milliGRAM(s) Oral every 6 hours PRN Mild Pain (1 - 3)  albuterol    90 MICROgram(s) HFA Inhaler 2 Puff(s) Inhalation every 6 hours PRN Shortness of Breath and/or Wheezing  aluminum hydroxide/magnesium hydroxide/simethicone Suspension 30 milliLiter(s) Oral every 4 hours PRN Dyspepsia  melatonin 3 milliGRAM(s) Oral at bedtime PRN Insomnia  ondansetron Injectable 4 milliGRAM(s) IV Push every 8 hours PRN Nausea and/or Vomiting    Vital Signs Last 24 Hrs  T(F): 97.4 (10 Feb 2024 05:00), Max: 98.3 (09 Feb 2024 20:04)  HR: 86 (10 Feb 2024 05:00) (58 - 87)  BP: 141/68 (10 Feb 2024 05:00) (111/64 - 141/68)  RR: 18 (10 Feb 2024 05:00) (18 - 18)  SpO2: 97% (10 Feb 2024 07:44) (86% - 97%)  I&O's Summary    10 Feb 2024 07:01  -  10 Feb 2024 09:54  --------------------------------------------------------  IN: 0 mL / OUT: 400 mL / NET: -400 mL      PHYSICAL EXAM:  General: NAD, A/O x 3  ENT: No gross hearing impairment, Moist mucous membranes, no thrush  Neck: Supple, No JVD  Lungs: diminished to auscultation bilaterally, good air entry, non-labored breathing  Cardio: RRR, S1/S2, No murmur  Abdomen: Soft, Nontender, Nondistended; Bowel sounds present  Extremities: No calf tenderness, No cyanosis, 1+ edema   Psych: Appropriate mood and affect    LABS:                        10.2   8.08  )-----------( 156      ( 10 Feb 2024 06:55 )             32.6     02-10    142  |  100  |  53  ----------------------------<  134  3.4   |  34  |  1.77    Ca    8.7      10 Feb 2024 06:55  Mg     2.5     02-10    02-07 Chol 103 mg/dL LDL -- HDL 48 mg/dL Trig 61 mg/dL    Urinalysis Basic - ( 10 Feb 2024 06:55 )    Color: x / Appearance: x / SG: x / pH: x  Gluc: 134 mg/dL / Ketone: x  / Bili: x / Urobili: x   Blood: x / Protein: x / Nitrite: x   Leuk Esterase: x / RBC: x / WBC x   Sq Epi: x / Non Sq Epi: x / Bacteria: x    Culture - Body Fluid with Gram Stain (collected 08 Feb 2024 12:40)  Source: .Body Fluid Other, Pleural Fluid  Gram Stain (09 Feb 2024 00:58):    No polymorphonuclear leukocytes seen per low power field    No organisms seen per oil power field  Preliminary Report (09 Feb 2024 16:08):    No growth to date.    Culture - Fungal, Body Fluid (collected 08 Feb 2024 12:40)  Source: Pleural Fl Pleural Fluid  Preliminary Report (09 Feb 2024 08:31):    Testing in progress

## 2024-02-10 NOTE — PROGRESS NOTE ADULT - NS ATTEND AMEND GEN_ALL_CORE FT
2.5 x 1.4 cm right retroareolar breast nodule larger than 9/19/2023.   Appreciate surgery recommendations, o/p followup    Bloody plerual effusion, s/p thoracocentesis 2/8/24, 1L removed  Spoke with transfer center; accepted patient to [facility] pending bed availability  Restarted eliquis  C/w lasix 40mg IV daily as per cardiology    DVT PPX: ICD  AM labs.

## 2024-02-10 NOTE — PROGRESS NOTE ADULT - SUBJECTIVE AND OBJECTIVE BOX
RAD SINGLETON  [id number]      Chief Complaint: Acute on chronic diastolic heart failure exacerbation/Moderate mitral stenosis     Interval History: The patient reports that breathing is stable, urinating well, denies chest discomfort.     Tele: atrial fibrillation 70s BPM, brief NSVT      Current meds:   acetaminophen     Tablet .. 650 milliGRAM(s) Oral every 6 hours PRN  albuterol    90 MICROgram(s) HFA Inhaler 2 Puff(s) Inhalation every 6 hours PRN  allopurinol 100 milliGRAM(s) Oral daily  aluminum hydroxide/magnesium hydroxide/simethicone Suspension 30 milliLiter(s) Oral every 4 hours PRN  apixaban 2.5 milliGRAM(s) Oral every 12 hours  aspirin enteric coated 81 milliGRAM(s) Oral daily  atorvastatin 40 milliGRAM(s) Oral at bedtime  budesonide 160 MICROgram(s)/formoterol 4.5 MICROgram(s) Inhaler 2 Puff(s) Inhalation two times a day  furosemide   Injectable 40 milliGRAM(s) IV Push daily  melatonin 3 milliGRAM(s) Oral at bedtime PRN  metoprolol succinate ER 50 milliGRAM(s) Oral daily  ondansetron Injectable 4 milliGRAM(s) IV Push every 8 hours PRN  pantoprazole    Tablet 40 milliGRAM(s) Oral before breakfast  petrolatum white Ointment 1 Application(s) Topical three times a day  polyethylene glycol 3350 17 Gram(s) Oral two times a day  tiotropium 2.5 MICROgram(s) Inhaler 2 Puff(s) Inhalation daily  triamcinolone 0.1% Oral Paste 1 Application(s) Topical every 12 hours      Objective:     Vital Signs:   T(C): 36.3 (02-10-24 @ 05:00), Max: 36.8 (02-09-24 @ 13:59)  HR: 86 (02-10-24 @ 05:00) (58 - 87)  BP: 141/68 (02-10-24 @ 05:00) (111/64 - 141/68)  RR: 18 (02-10-24 @ 05:00) (18 - 18)  SpO2: 97% (02-10-24 @ 07:44) (86% - 97%)  Wt(kg): --    Physical Exam:   General: no distress  Neck: supple   CVS: JVP ~ 10 cm H20, irregularly irregular, s1, s2  Pulm: decreased breath sounds  Ext: mild lower extremity edema b/l   Neuro: awake, alert  Psych: Normal affect      Labs:   10 Feb 2024 06:55    142    |  100    |  53     ----------------------------<  134    3.4     |  34     |  1.77     Ca    8.7        10 Feb 2024 06:55  Mg     2.5       10 Feb 2024 06:55                            10.2   8.08  )-----------( 156      ( 10 Feb 2024 06:55 )             32.6             ECG (2/7/24): atrial fibrillation    TTE (2/7/24):   1. Normal global left ventricular systolic function.   2. Left ventricular ejection fraction, by visual estimation, is 55 to 60%.   3. Calculated LVEF by Simpsons Biplane Method 57%.   4. Normal left ventricular internal cavity size.   5. There is mild septal left ventricular hypertrophy.   6. Severely enlarged left atrium.   7. Severely enlarged right atrium.   8. Rheumatic mitral valve.   9. Mild thickening and calcification of the anterior and posterior   mitral valve leaflets.  10. Moderate mitral valve stenosis (peak velocity 1.9 m/s, mean gradient 8 mmHg at HR 53 BPM, MVA 1.0 cm^2).  11. Mild mitral valve regurgitation.  12. Mild-moderate tricuspid regurgitation.  13. Aortic sclerosis with borderline decreased opening.  14. Mild aortic regurgitation.  15. Estimated pulmonary artery systolic pressure is 53.9 mmHg assuming a right atrial pressure of 15 mmHg, which is consistent with moderate pulmonary hypertension.  16. Large pleural effusion in the left lateral region.

## 2024-02-10 NOTE — PROGRESS NOTE ADULT - ASSESSMENT
89 year old male with PMH of afib on Eliquis, CHF (EF 45-50%, combined systolic and diastolic), CVA, HTN, COPD, CKD3, multiple hospitalizations for CHF exacerbation in the prior months. Patient also recently had a left sided pleural effusion that was drained last hospitalization.  CXR on this admission w/ large effusion. Patient to be admitted for CHF exacerbation.     #Acute on chronic hypoxic respiratory failure 2/2 bloody large left pleural effusion  - pt with multiple admissions recently and with drainage of pleural effusions  - s/p Thoracentesis on 12/8, 1 L drained, bloody per Dr. Ortez  - pending tx to [facility] for Pleurx   - Echo 2/7: EF 55-60%, severely enlarged right/left atriums, pulm htn, mod mitral valve stenosis  - on supplemental O2; 2L nc and sats 98%  - CXR: Heart enlargement and quite large left effusion  - continue lasix 40 mg. IV daily  - Troponin neg, probnp 3572   - on fluid restriction 1L daily  - Cardiology following; cont current meds  - Farxiga held while on IV lasix  - Palliative consulted for GOC; pt is currently full code     #CT chest finding   #2.5 x 1.4 cm right retroareolar breast nodule larger than 9/19/2023   - unable to obtain imaging at [facility], plan to follow up outpatient   - f/u at [facility]     #left lower ext blister   - continue local wound care   - wound care instructions from last hospitalization: Clean with NS (clean in circular motions with NS and gauze). Pat dry. Apply liquid barrier film to periwound skin, allow to dry. Place non adherent silicone layer dressing (Adaptic touch) over bases of wound, cover with Aquacel AG hydrofiber sheet, cover with ABD pad and kerlix, secure with paper tape. Change daily or PRN if soiled.    #Hx of CVA   #HTN  #CHF; chronic   #Afib; chronic   - no deficits noted   - continue statin, metoprolol, ASA  - continue eliquis     #COPD  - no home o2, but patient reports he uses oxygen cans OTC he buys from store   - continue albuterol prn  - continue spiriva, symbicort     #IRINEO 2/2 CKD3   - Cr. 1.77, around baseline   - Trend Cr.   - avoid nephrotoxins    #Hypernatremia; resolved  #Hypokalemia; 2/2 to Lasix  - K repleted   - cont to monitor bmp    #DVT ppx:  Eliquis     GOC - full code     Dispo: pending transfer to [facility]    called Daughter Salena  89 year old male with PMH of afib on Eliquis, CHF (EF 45-50%, combined systolic and diastolic), CVA, HTN, COPD, CKD3, multiple hospitalizations for CHF exacerbation in the prior months. Patient also recently had a left sided pleural effusion that was drained last hospitalization.  CXR on this admission w/ large effusion. Patient to be admitted for CHF exacerbation.     #Acute on chronic hypoxic respiratory failure 2/2 bloody large left pleural effusion  - pt with multiple admissions recently and with drainage of pleural effusions  - s/p Thoracentesis on 12/8, 1 L drained, bloody per Dr. Ortez  - pending tx to [facility] for Pleurx   - Echo 2/7: EF 55-60%, severely enlarged right/left atriums, pulm htn, mod mitral valve stenosis  - on supplemental O2; 2L nc and sats 98%  - CXR: Heart enlargement and quite large left effusion  - continue lasix 40 mg. IV daily  - Troponin neg, probnp 3572   - on fluid restriction 1L daily  - Cardiology following; cont current meds  - Farxiga held while on IV lasix  - Palliative consulted for GOC; pt is currently full code     #CT chest finding   #2.5 x 1.4 cm right retroareolar breast nodule larger than 9/19/2023   - Appreciate surgical team recommendations  - unable to obtain imaging at [facility], plan to follow up outpatient   - f/u at [facility]     #left lower ext blister   - continue local wound care   - wound care instructions from last hospitalization: Clean with NS (clean in circular motions with NS and gauze). Pat dry. Apply liquid barrier film to periwound skin, allow to dry. Place non adherent silicone layer dressing (Adaptic touch) over bases of wound, cover with Aquacel AG hydrofiber sheet, cover with ABD pad and kerlix, secure with paper tape. Change daily or PRN if soiled.    #Hx of CVA   #HTN  #CHF; chronic   #Afib; chronic   - no deficits noted   - continue statin, metoprolol, ASA  - continue eliquis     #COPD  - no home o2, but patient reports he uses oxygen cans OTC he buys from store   - continue albuterol prn  - continue spiriva, symbicort     #IRINEO 2/2 CKD3   - Cr. 1.77, around baseline   - Trend Cr.   - avoid nephrotoxins    #Hypernatremia; resolved  #Hypokalemia; 2/2 to Lasix  - K repleted   - cont to monitor bmp    #DVT ppx:  Eliquis     GOC - full code     Dispo: pending transfer to [facility]    called Daughter Salena  89 year old male with PMH of afib on Eliquis, CHF (EF 45-50%, combined systolic and diastolic), CVA, HTN, COPD, CKD3, multiple hospitalizations for CHF exacerbation in the prior months. Patient also recently had a left sided pleural effusion that was drained last hospitalization.  CXR on this admission w/ large effusion. Patient to be admitted for CHF exacerbation.     #Acute on chronic hypoxic respiratory failure 2/2 bloody large left pleural effusion  - pt with multiple admissions recently and with drainage of pleural effusions  - s/p Thoracentesis on 12/8, 1 L drained, bloody per Dr. Ortez  - pending tx to [facility] for Pleurx   - Echo 2/7: EF 55-60%, severely enlarged right/left atriums, pulm htn, mod mitral valve stenosis  - on supplemental O2; 2L nc and sats 98%  - CXR: Heart enlargement and quite large left effusion  - continue lasix 40 mg. IV daily  - Troponin neg, probnp 3572   - on fluid restriction 1L daily  - Cardiology following; cont current meds  - Farxiga held while on IV lasix  - Palliative consulted for GOC; pt is currently full code     #CT chest finding   #2.5 x 1.4 cm right retroareolar breast nodule larger than 9/19/2023   - Appreciate surgical team recommendations  - unable to obtain imaging at [facility], plan to follow up outpatient   - f/u at [facility]     #left lower ext blister   - continue local wound care   - wound care instructions from last hospitalization: Clean with NS (clean in circular motions with NS and gauze). Pat dry. Apply liquid barrier film to periwound skin, allow to dry. Place non adherent silicone layer dressing (Adaptic touch) over bases of wound, cover with Aquacel AG hydrofiber sheet, cover with ABD pad and kerlix, secure with paper tape. Change daily or PRN if soiled.    #Hx of CVA   #HTN  #CHF; chronic   #Afib; chronic   - no deficits noted   - continue statin, metoprolol, ASA  - continue eliquis     #COPD  - no home o2, but patient reports he uses oxygen cans OTC he buys from store   - continue albuterol prn  - continue spiriva, symbicort     #IRINEO 2/2 CKD3   - Cr. 1.77, around baseline   - Trend Cr.   - avoid nephrotoxins    #Hypernatremia; resolved  #Hypokalemia; 2/2 to Lasix  - K repleted   - cont to monitor bmp    #DVT ppx:  Eliquis     GOC - full code     Dispo: pending transfer to [facility]    Updated son at bedside, answered all questions and in agreement with plan

## 2024-02-10 NOTE — PROGRESS NOTE ADULT - ASSESSMENT
Physical Examination:  GENERAL:               Alert, Oriented, No acute distress.    HEENT:                   No JVD, Moist MM  PULM:                     Bilateral air entry, diminished to ausciltion on left , no significant sputum production, No Rales, No Rhonchi, No Wheezing  CVS:                         S1, S2,  +murmur  ABD:                        Soft, nondistended, nontender, normoactive bowel sounds,   EXT:                         +edema, nontender, No Cyanosis or Clubbing   NEURO:                  Alert, oriented, interactive, nonfocal, follows commands  PSYC:                      Calm, + Insight.         Assessment  Dyspnea suspect due to acute on chronic Effusion Left  underlying Diastolic CHF possible  Chronic Left pleural effusions  s/p thoracentesis 11/24/23, 2/8/24- Exudate   Abnormal CT 2.5 x 1.4 cm right retroareolar breast nodule larger than 9/19/2023. Stable prominent mediastinal lymph nodes. Redemonstrated asymmetrical gynecomastia.      Afib on a/c  Cigar smoker, at risk for copd.   Worsening renal function on CKD    Plan  pleural effusion recurrent, Exudative and serosanguinous with previously negative cytology  f/u repeat cyto results    will need out patient f/u for workup of  breast nodule    will need Pleurex and pleural biopsy, can even consider ebus if can evaluate mediastinal lymph nodes       will need to transfer for pleurex/pleural biopsy +/- EBUS    Case d/w Dr. Hull

## 2024-02-11 NOTE — PROGRESS NOTE ADULT - NS ATTEND AMEND GEN_ALL_CORE FT
2.5 x 1.4 cm right retroareolar breast nodule larger than 9/19/2023.   Appreciate surgery recommendations, o/p followup    Bloody plerual effusion, s/p thoracocentesis 2/8/24, 1L removed  Spoke with transfer center; accepted patient to [facility] pending bed availability  Restarted eliquis  C/w lasix 40mg IV daily as per cardiology    DVT PPX: ICD  AM labs.

## 2024-02-11 NOTE — PROGRESS NOTE ADULT - ASSESSMENT
89 year old male with PMH of afib on Eliquis, CHF (EF 45-50%, combined systolic and diastolic), CVA, HTN, COPD, CKD3, multiple hospitalizations for CHF exacerbation in the prior months. Patient also recently had a left sided pleural effusion that was drained last hospitalization.  CXR on this admission w/ large effusion. Patient to be admitted for CHF exacerbation.     #Acute on chronic hypoxic respiratory failure 2/2 bloody large left pleural effusion  - pt with multiple admissions recently and with drainage of pleural effusions  - s/p Thoracentesis on 12/8, 1 L drained, bloody per Dr. Ortez  - pending tx to [facility] for Pleurx   - Echo 2/7: EF 55-60%, severely enlarged right/left atriums, pulm htn, mod mitral valve stenosis  - on supplemental O2; increasing requirements overnight, continue to monitor   - CXR: Heart enlargement and quite large left effusion  - continue lasix 40 mg. IV daily  - Troponin neg, probnp 3572   - on fluid restriction 1L daily  - Cardiology following; cont current meds  - Farxiga held while on IV lasix  - Palliative consulted for GOC; pt is currently full code     #CT chest finding   #2.5 x 1.4 cm right retroareolar breast nodule larger than 9/19/2023   - Appreciate surgical team recommendations  - unable to obtain imaging at [facility], plan to follow up outpatient   - f/u at [facility]     #left lower ext blister   - continue local wound care   - wound care instructions from last hospitalization: Clean with NS (clean in circular motions with NS and gauze). Pat dry. Apply liquid barrier film to periwound skin, allow to dry. Place non adherent silicone layer dressing (Adaptic touch) over bases of wound, cover with [employer] hydrofiber sheet, cover with ABD pad and kerlix, secure with paper tape. Change daily or PRN if soiled.    #Hx of CVA   #HTN  #CHF; chronic   #Afib; chronic   - no deficits noted   - continue statin, metoprolol, ASA  - continue eliquis     #COPD  - no home o2, but patient reports he uses oxygen cans OTC he buys from store   - continue albuterol prn  - continue spiriva, symbicort     #IRINEO 2/2 CKD3   - Cr. 1.77, around baseline   - Trend Cr.   - avoid nephrotoxins    #Hypernatremia; resolved  #Hypokalemia; 2/2 to Lasix  - K repleted   - cont to monitor bmp    #DVT ppx:  Eliquis     GOC - full code     Dispo: pending transfer to [facility]    Updated son at bedside, answered all questions and in agreement with plan  89 year old male with PMH of afib on Eliquis, CHF (EF 45-50%, combined systolic and diastolic), CVA, HTN, COPD, CKD3, multiple hospitalizations for CHF exacerbation in the prior months. Patient also recently had a left sided pleural effusion that was drained last hospitalization.  CXR on this admission w/ large effusion. Patient to be admitted for CHF exacerbation.     #Acute on chronic hypoxic respiratory failure 2/2 bloody large left pleural effusion  - pt with multiple admissions recently and with drainage of pleural effusions  - s/p Thoracentesis on 12/8, 1 L drained, bloody per Dr. Ortez  - pending tx to [facility] for Pleurx   - Echo 2/7: EF 55-60%, severely enlarged right/left atriums, pulm htn, mod mitral valve stenosis  - on supplemental O2; increasing requirements overnight, continue to wean  - CXR: Heart enlargement and quite large left effusion  - continue lasix 40 mg. IV daily  - Troponin neg, probnp 3572   - on fluid restriction 1L daily  - Cardiology following; cont current meds  - Farxiga held while on IV lasix  - Palliative consulted for GOC; pt is currently full code     #CT chest finding   #2.5 x 1.4 cm right retroareolar breast nodule larger than 9/19/2023   - Appreciate surgical team recommendations  - unable to obtain imaging at [facility], plan to follow up outpatient   - f/u at [facility]     #left lower ext blister   - continue local wound care   - wound care instructions from last hospitalization: Clean with NS (clean in circular motions with NS and gauze). Pat dry. Apply liquid barrier film to periwound skin, allow to dry. Place non adherent silicone layer dressing (Adaptic touch) over bases of wound, cover with Aqu[employer] hydrofiber sheet, cover with ABD pad and kerlix, secure with paper tape. Change daily or PRN if soiled.    #Hx of CVA   #HTN  #CHF; chronic   #Afib; chronic   - no deficits noted   - continue statin, metoprolol, ASA  - continue eliquis     #COPD  - no home o2, but patient reports he uses oxygen cans OTC he buys from store   - continue albuterol prn  - continue spiriva, symbicort     #IRINEO 2/2 CKD3   - Cr. ___, around baseline   - Trend Cr.   - avoid nephrotoxins    #Hypernatremia; resolved  #Hypokalemia; 2/2 to Lasix  - K repleted   - cont to monitor bmp    #DVT ppx:  Eliquis     GOC - full code     Dispo: pending transfer to [facility]    Updated son at bedside, answered all questions and in agreement with plan  89 year old male with PMH of afib on Eliquis, CHF (EF 45-50%, combined systolic and diastolic), CVA, HTN, COPD, CKD3, multiple hospitalizations for CHF exacerbation in the prior months. Patient also recently had a left sided pleural effusion that was drained last hospitalization.  CXR on this admission w/ large effusion. Patient to be admitted for CHF exacerbation.     #Acute on chronic hypoxic respiratory failure 2/2 bloody large left pleural effusion  - pt with multiple admissions recently and with drainage of pleural effusions  - s/p Thoracentesis on 12/8, 1 L drained, bloody per Dr. Ortez  - pending tx to [facility] for Pleurx   - Echo 2/7: EF 55-60%, severely enlarged right/left atriums, pulm htn, mod mitral valve stenosis  - currently on RA   - CXR: Heart enlargement and quite large left effusion  - continue lasix 40 mg. IV daily  - Troponin neg, probnp 3572   - on fluid restriction 1L daily  - Cardiology following; cont current meds  - Farxiga held while on IV lasix  - Palliative consulted for GOC; pt is currently full code     #CT chest finding   #2.5 x 1.4 cm right retroareolar breast nodule larger than 9/19/2023   - Appreciate surgical team recommendations  - unable to obtain imaging at [facility], plan to follow up outpatient   - f/u at [facility]     #left lower ext blister   - continue local wound care   - wound care instructions from last hospitalization: Clean with NS (clean in circular motions with NS and gauze). Pat dry. Apply liquid barrier film to periwound skin, allow to dry. Place non adherent silicone layer dressing (Adaptic touch) over bases of wound, cover with Aqu[employer] hydrofiber sheet, cover with ABD pad and kerlix, secure with paper tape. Change daily or PRN if soiled.    #Hx of CVA   #HTN  #CHF; chronic   #Afib; chronic   - no deficits noted   - continue statin, metoprolol, ASA  - continue eliquis     #COPD  - no home o2, but patient reports he uses oxygen cans OTC he buys from store   - continue albuterol prn  - continue spiriva, symbicort     #IRINEO 2/2 CKD3   - Cr. ___, around baseline   - Trend Cr.   - avoid nephrotoxins    #Hypernatremia; resolved  #Hypokalemia; 2/2 to Lasix  - K repleted   - cont to monitor bmp    #DVT ppx:  Eliquis     GOC - full code     Dispo: pending transfer to [facility]    Updated son at bedside, answered all questions and in agreement with plan  89 year old male with PMH of afib on Eliquis, CHF (EF 45-50%, combined systolic and diastolic), CVA, HTN, COPD, CKD3, multiple hospitalizations for CHF exacerbation in the prior months. Patient also recently had a left sided pleural effusion that was drained last hospitalization.  CXR on this admission w/ large effusion. Patient to be admitted for CHF exacerbation.     #Acute on chronic hypoxic respiratory failure 2/2 bloody large left pleural effusion  - pt with multiple admissions recently and with drainage of pleural effusions  - s/p Thoracentesis on 12/8, 1 L drained, bloody per Dr. Ortez  - pending tx to [facility] for Pleurx   - Echo 2/7: EF 55-60%, severely enlarged right/left atriums, pulm htn, mod mitral valve stenosis  - currently on RA   - CXR: Heart enlargement and quite large left effusion  - continue lasix 40 mg. IV daily  - Troponin neg, probnp 3572   - on fluid restriction 1L daily  - Cardiology following; cont current meds  - Farxiga held while on IV lasix  - Palliative consulted for GOC; pt is currently full code     #CT chest finding   #2.5 x 1.4 cm right retroareolar breast nodule larger than 9/19/2023   - Appreciate surgical team recommendations  - unable to obtain imaging at [facility], plan to follow up outpatient   - f/u at [facility]     #left lower ext blister   - continue local wound care   - wound care instructions from last hospitalization: Clean with NS (clean in circular motions with NS and gauze). Pat dry. Apply liquid barrier film to periwound skin, allow to dry. Place non adherent silicone layer dressing (Adaptic touch) over bases of wound, cover with Aqu[employer] hydrofiber sheet, cover with ABD pad and kerlix, secure with paper tape. Change daily or PRN if soiled.    #Hx of CVA   #HTN  #CHF; chronic   #Afib; chronic   - no deficits noted   - continue statin, metoprolol, ASA  - eliquis d/c'd, started lovenox full dose per cardio rec     #COPD  - no home o2, but patient reports he uses oxygen cans OTC he buys from store   - continue albuterol prn  - continue spiriva, symbicort     #IRINEO 2/2 CKD3   - Cr. 1.7, around baseline   - Trend Cr.   - avoid nephrotoxins    #Hypernatremia; resolved  #Hypokalemia; 2/2 to Lasix  - K repleted   - cont to monitor bmp    #DVT ppx: lovenox full dosing     GOC - full code     Dispo: pending transfer to [facility]    Updated son at bedside, answered all questions and in agreement with plan  89 year old male with PMH of afib on Eliquis, CHF (EF 45-50%, combined systolic and diastolic), CVA, HTN, COPD, CKD3, multiple hospitalizations for CHF exacerbation in the prior months. Patient also recently had a left sided pleural effusion that was drained last hospitalization.  CXR on this admission w/ large effusion. Patient to be admitted for CHF exacerbation.     #Acute on chronic hypoxic respiratory failure 2/2 bloody large left pleural effusion  - pt with multiple admissions recently and with drainage of pleural effusions  - s/p Thoracentesis on 12/8, 1 L drained, bloody per Dr. Ortez  - pending tx to [facility] for Pleurx   - Echo 2/7: EF 55-60%, severely enlarged right/left atriums, pulm htn, mod mitral valve stenosis  - currently on RA   - CXR: Heart enlargement and quite large left effusion  - continue lasix 40 mg. IV daily  - Troponin neg, probnp 3572   - on fluid restriction 1L daily  - Cardiology following; cont current meds  - Farxiga held while on IV lasix  - Palliative consulted for GOC; pt is currently full code     #CT chest finding   #2.5 x 1.4 cm right retroareolar breast nodule larger than 9/19/2023   - Appreciate surgical team recommendations  - unable to obtain imaging at [facility], plan to follow up outpatient   - f/u at [facility]     #left lower ext blister   - continue local wound care   - wound care instructions from last hospitalization: Clean with NS (clean in circular motions with NS and gauze). Pat dry. Apply liquid barrier film to periwound skin, allow to dry. Place non adherent silicone layer dressing (Adaptic touch) over bases of wound, cover with Aqu[employer] hydrofiber sheet, cover with ABD pad and kerlix, secure with paper tape. Change daily or PRN if soiled.    #Hx of CVA   #HTN  #CHF; chronic   #Afib; chronic   - no deficits noted   - continue statin, metoprolol, ASA  - eliquis d/c'd, transitioned to heparin gtt at 1200 units/hr per cardio rec     #COPD  - no home o2, but patient reports he uses oxygen cans OTC he buys from store   - continue albuterol prn  - continue spiriva, symbicort     #IRINEO 2/2 CKD3   - Cr. 1.7, around baseline   - Trend Cr.   - avoid nephrotoxins    #Hypernatremia; resolved  #Hypokalemia; 2/2 to Lasix  - K repleted   - cont to monitor bmp    #DVT ppx: heparin gtt     GOC - full code     Dispo: pending transfer to [facility]    Updated son at bedside, answered all questions and in agreement with plan  89 year old male with PMH of afib on Eliquis, CHF (EF 45-50%, combined systolic and diastolic), CVA, HTN, COPD, CKD3, multiple hospitalizations for CHF exacerbation in the prior months. Patient also recently had a left sided pleural effusion that was drained last hospitalization.  CXR on this admission w/ large effusion. Patient to be admitted for CHF exacerbation.     #Acute on chronic hypoxic respiratory failure 2/2 bloody large left pleural effusion  - pt with multiple admissions recently and with drainage of pleural effusions  - s/p Thoracentesis on 12/8, 1 L drained, bloody per Dr. Ortez  - pending tx to [facility] for Pleurx   - Echo 2/7: EF 55-60%, severely enlarged right/left atriums, pulm htn, mod mitral valve stenosis  - currently on RA   - CXR: Heart enlargement and quite large left effusion  - continue lasix 40 mg. IV daily  - Troponin neg, probnp 3572   - on fluid restriction 1L daily  - Cardiology following; cont current meds  - Farxiga held while on IV lasix  - Palliative consulted for GOC; pt is currently full code     #CT chest finding   #2.5 x 1.4 cm right retroareolar breast nodule larger than 9/19/2023   - Appreciate surgical team recommendations  - unable to obtain imaging at [facility], plan to follow up outpatient   - f/u at [facility]     #left lower ext blister   - continue local wound care   - wound care instructions from last hospitalization: Clean with NS (clean in circular motions with NS and gauze). Pat dry. Apply liquid barrier film to periwound skin, allow to dry. Place non adherent silicone layer dressing (Adaptic touch) over bases of wound, cover with Aqu[employer]el [employer] hydrofiber sheet, cover with ABD pad and kerlix, secure with paper tape. Change daily or PRN if soiled.    #Hx of CVA   #HTN  #CHF; chronic   #Afib; chronic   - no deficits noted   - continue statin, metoprolol, ASA  - eliquis d/c'd, transitioned to heparin gtt at 1200 units/hr per cardio rec     #COPD  - no home o2, but patient reports he uses oxygen cans OTC he buys from store   - continue albuterol prn  - continue spiriva, symbicort     #IRINEO 2/2 CKD3   - Cr. 1.7, around baseline   - Trend Cr.   - avoid nephrotoxins    #Hypernatremia; resolved  #Hypokalemia; 2/2 to Lasix  - K repleted   - cont to monitor bmp    #DVT ppx: heparin gtt     GOC - full code     Dispo: pending transfer to [facility]    Left VM for daughter Frida

## 2024-02-11 NOTE — PROGRESS NOTE ADULT - SUBJECTIVE AND OBJECTIVE BOX
Follow-up Pulmonary Progress Note  Chief Complaint : Heart failure    patient seen and examined  comfortable on room air  no copmplaints.       Allergies :No Known Allergies      PAST MEDICAL & SURGICAL HISTORY:  Afib    Congestive heart failure (CHF)    CVA (cerebral vascular accident)    Hypertension, unspecified type    Chronic obstructive pulmonary disease (COPD)    No significant past surgical history        Medications:  MEDICATIONS  (STANDING):  allopurinol 100 milliGRAM(s) Oral daily  aspirin enteric coated 81 milliGRAM(s) Oral daily  atorvastatin 40 milliGRAM(s) Oral at bedtime  budesonide 160 MICROgram(s)/formoterol 4.5 MICROgram(s) Inhaler 2 Puff(s) Inhalation two times a day  furosemide   Injectable 40 milliGRAM(s) IV Push daily  metoprolol succinate ER 50 milliGRAM(s) Oral daily  pantoprazole    Tablet 40 milliGRAM(s) Oral before breakfast  petrolatum white Ointment 1 Application(s) Topical three times a day  polyethylene glycol 3350 17 Gram(s) Oral two times a day  tiotropium 2.5 MICROgram(s) Inhaler 2 Puff(s) Inhalation daily  triamcinolone 0.1% Oral Paste 1 Application(s) Topical every 12 hours    MEDICATIONS  (PRN):  acetaminophen     Tablet .. 650 milliGRAM(s) Oral every 6 hours PRN Mild Pain (1 - 3)  albuterol    90 MICROgram(s) HFA Inhaler 2 Puff(s) Inhalation every 6 hours PRN Shortness of Breath and/or Wheezing  aluminum hydroxide/magnesium hydroxide/simethicone Suspension 30 milliLiter(s) Oral every 4 hours PRN Dyspepsia  melatonin 3 milliGRAM(s) Oral at bedtime PRN Insomnia  ondansetron Injectable 4 milliGRAM(s) IV Push every 8 hours PRN Nausea and/or Vomiting      Antibiotics History      Heme Medications   aspirin enteric coated 81 milliGRAM(s) Oral daily, 02-06-24 @ 18:13      GI Medications  aluminum hydroxide/magnesium hydroxide/simethicone Suspension 30 milliLiter(s) Oral every 4 hours, 02-06-24 @ 18:44, Routine PRN  pantoprazole    Tablet 40 milliGRAM(s) Oral before breakfast, 02-06-24 @ 18:17, Routine  polyethylene glycol 3350 17 Gram(s) Oral two times a day, 02-06-24 @ 18:16, Routine        LABS:                        10.2   8.08  )-----------( 156      ( 10 Feb 2024 06:55 )             32.6     02-11    141  |  100  |  56<H>  ----------------------------<  112<H>  4.4   |  34<H>  |  1.70<H>    Ca    8.9      11 Feb 2024 07:20  Mg     2.4     02-11      Fluid characteristics  -- 02-08 @ 12:40  pH 7.4    tprot 5.0    Cell count  Appearance Bloody  Fluid type --  BF lymph 57  color Red  eosinophil 1  PMN --  Mesothelial 8  Monocyte 29  Other body cells --           CULTURES: (if applicable)    Culture - Body Fluid with Gram Stain (collected 02-08-24 @ 12:40)  Source: .Body Fluid Other, Pleural Fluid  Gram Stain (02-09-24 @ 00:58):    No polymorphonuclear leukocytes seen per low power field    No organisms seen per oil power field  Preliminary Report (02-09-24 @ 16:08):    No growth to date.    Culture - Fungal, Body Fluid (collected 02-08-24 @ 12:40)  Source: Pleural Fl Pleural Fluid  Preliminary Report (02-10-24 @ 15:04):    Culture is being performed. Fungal cultures are held for 4 weeks.      Rapid RVP Result: NotDetec (02-06-24 @ 18:22)         VITALS:  T(C): 36.8 (02-11-24 @ 05:12), Max: 36.8 (02-11-24 @ 05:12)  T(F): 98.2 (02-11-24 @ 05:12), Max: 98.2 (02-11-24 @ 05:12)  HR: 73 (02-11-24 @ 05:12) (68 - 76)  BP: 132/69 (02-11-24 @ 05:12) (117/63 - 132/69)  BP(mean): --  ABP: --  ABP(mean): --  RR: 17 (02-11-24 @ 05:12) (16 - 18)  SpO2: 91% (02-11-24 @ 05:12) (91% - 96%)  CVP(mm Hg): --  CVP(cm H2O): --    Ins and Outs     02-10-24 @ 07:01  -  02-11-24 @ 07:00  --------------------------------------------------------  IN: 0 mL / OUT: 900 mL / NET: -900 mL                I&O's Detail    10 Feb 2024 07:01  -  11 Feb 2024 07:00  --------------------------------------------------------  IN:  Total IN: 0 mL    OUT:    Voided (mL): 900 mL  Total OUT: 900 mL    Total NET: -900 mL

## 2024-02-11 NOTE — PROGRESS NOTE ADULT - ASSESSMENT
Physical Examination:  GENERAL:               Alert, Oriented, No acute distress.    HEENT:                   No JVD, Moist MM  PULM:                     Bilateral air entry, diminished to ausciltion on left , no significant sputum production, No Rales, No Rhonchi, No Wheezing  CVS:                         S1, S2,  +murmur  ABD:                        Soft, nondistended, nontender, normoactive bowel sounds,   EXT:                         +edema, nontender, No Cyanosis or Clubbing   NEURO:                  Alert, oriented, interactive, nonfocal, follows commands  PSYC:                      Calm, + Insight.         Assessment  Dyspnea suspect due to acute on chronic Effusion Left  underlying Diastolic CHF possible  Chronic Left pleural effusions  s/p thoracentesis 11/24/23, 2/8/24- Exudate   Abnormal CT 2.5 x 1.4 cm right retroareolar breast nodule larger than 9/19/2023. Stable prominent mediastinal lymph nodes. Redemonstrated asymmetrical gynecomastia.      Afib on a/c  Cigar smoker, at risk for copd.   Worsening renal function on CKD    Plan  pleural effusion recurrent, Exudative and serosanguinous with previously negative cytology  f/u repeat cyto results    will need out patient f/u for workup of  breast nodule  will need Pleurex and pleural biopsy, can even consider ebus if can evaluate mediastinal lymph nodes       will need to transfer for pleurex/pleural biopsy +/- EBUS, vs out patient f/u with thoracic, but unsure if patient will follow     Case d/w Dr. Hull

## 2024-02-11 NOTE — PROGRESS NOTE ADULT - ASSESSMENT
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), Moderate mitral valve stenosis, Hypertension, Chronic kidney disease, COPD and history of CVA with recurrent hospitalizations for congestive heart failure likely from medication noncompliance and recent hospitalization at [facility] last month for congestive heart failure exacerbation deemed not to require chest tube placement at the time, also with moderate to severe PAD - deemed to be poor surgical candidate for lower extremity vascular intervention, now presents with progressive dyspnea, found to have acute on chronic diastolic heart failure exacerbation with large left pleural effusion.    ECG consisent with atrial fibrillation. Troponins negative x 2, does not appear to be an acute coronary syndrome. 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 The patient is now s/p thoracentesis with 1 L fluid removed.    Recommendations:  [] Acute on chronic diastolic heart failure exacerbation with chronic pleural effusions: S/p thoracentesis, patient awaiting bed availability at [facility] for Pleurx catheter. Continue IV diuresis, patient currently net negative 1.8 L. Continue Metoprolol succinate 50 mg daily. Plan to resume SGLT2-I when euvolemic. Follow up fluid studies and Pulmonary.  [] Moderate mitral valve stenosis: The patient would appear to be a poor candidate for MV PMBC or surgical repair at this age and also due to comorbidities, can obtain structural heart team evaluation while at [facility], likely outpatient management.   [] Atrial fibrillation: Rate controlled, continue beta blocker. Given moderate mitral stenosis the patient would likely benefit from coumadin instead, to be discussed with patient and family.     Updated primary team. Patient awaiting bed availability at [facility] for Pleurx catheter.     Kevin Hong MD  Cardiology

## 2024-02-11 NOTE — PROGRESS NOTE ADULT - SUBJECTIVE AND OBJECTIVE BOX
RAD SINGLETON  [id number]        Chief Complaint: Acute on chronic diastolic heart failure exacerbation/Moderate mitral stenosis     Interval History: The patient reports that breathing is stable, urinating well, denies chest discomfort.     Tele: atrial fibrillation 60s BPM      Current meds:   acetaminophen     Tablet .. 650 milliGRAM(s) Oral every 6 hours PRN  albuterol    90 MICROgram(s) HFA Inhaler 2 Puff(s) Inhalation every 6 hours PRN  allopurinol 100 milliGRAM(s) Oral daily  aluminum hydroxide/magnesium hydroxide/simethicone Suspension 30 milliLiter(s) Oral every 4 hours PRN  apixaban 2.5 milliGRAM(s) Oral every 12 hours  aspirin enteric coated 81 milliGRAM(s) Oral daily  atorvastatin 40 milliGRAM(s) Oral at bedtime  budesonide 160 MICROgram(s)/formoterol 4.5 MICROgram(s) Inhaler 2 Puff(s) Inhalation two times a day  furosemide   Injectable 40 milliGRAM(s) IV Push daily  melatonin 3 milliGRAM(s) Oral at bedtime PRN  metoprolol succinate ER 50 milliGRAM(s) Oral daily  ondansetron Injectable 4 milliGRAM(s) IV Push every 8 hours PRN  pantoprazole    Tablet 40 milliGRAM(s) Oral before breakfast  petrolatum white Ointment 1 Application(s) Topical three times a day  polyethylene glycol 3350 17 Gram(s) Oral two times a day  tiotropium 2.5 MICROgram(s) Inhaler 2 Puff(s) Inhalation daily  triamcinolone 0.1% Oral Paste 1 Application(s) Topical every 12 hours      Objective:     Vital Signs:   T(C): 36.8 (02-11-24 @ 05:12), Max: 36.8 (02-11-24 @ 05:12)  HR: 73 (02-11-24 @ 05:12) (68 - 76)  BP: 132/69 (02-11-24 @ 05:12) (117/63 - 132/69)  RR: 17 (02-11-24 @ 05:12) (16 - 18)  SpO2: 91% (02-11-24 @ 05:12) (91% - 96%)  Wt(kg): --      Physical Exam:   General: no distress  Neck: supple   CVS: JVP ~ 10 cm H20, irregularly irregular, s1, s2  Pulm: decreased breath sounds  Ext: mild lower extremity edema b/l   Neuro: awake, alert  Psych: Normal affect        Labs:   11 Feb 2024 07:20    141    |  100    |  56     ----------------------------<  112    4.4     |  34     |  1.70     Ca    8.9        11 Feb 2024 07:20  Mg     2.4       11 Feb 2024 07:20                            10.2   8.08  )-----------( 156      ( 10 Feb 2024 06:55 )             32.6         ECG (2/7/24): atrial fibrillation    TTE (2/7/24):   1. Normal global left ventricular systolic function.   2. Left ventricular ejection fraction, by visual estimation, is 55 to 60%.   3. Calculated LVEF by Simpsons Biplane Method 57%.   4. Normal left ventricular internal cavity size.   5. There is mild septal left ventricular hypertrophy.   6. Severely enlarged left atrium.   7. Severely enlarged right atrium.   8. Rheumatic mitral valve.   9. Mild thickening and calcification of the anterior and posterior   mitral valve leaflets.  10. Moderate mitral valve stenosis (peak velocity 1.9 m/s, mean gradient 8 mmHg at HR 53 BPM, MVA 1.0 cm^2).  11. Mild mitral valve regurgitation.  12. Mild-moderate tricuspid regurgitation.  13. Aortic sclerosis with borderline decreased opening.  14. Mild aortic regurgitation.  15. Estimated pulmonary artery systolic pressure is 53.9 mmHg assuming a right atrial pressure of 15 mmHg, which is consistent with moderate pulmonary hypertension.  16. Large pleural effusion in the left lateral region.

## 2024-02-11 NOTE — PROGRESS NOTE ADULT - SUBJECTIVE AND OBJECTIVE BOX
Patient is a 89y old  Male who presents with a chief complaint of Shortness of breath (11 Feb 2024 09:54)      Patient seen and examined at bedside. No overnight events reported. Patient seen sitting up in chair     ALLERGIES:  No Known Allergies    MEDICATIONS  (STANDING):  allopurinol 100 milliGRAM(s) Oral daily  apixaban 2.5 milliGRAM(s) Oral every 12 hours  aspirin enteric coated 81 milliGRAM(s) Oral daily  atorvastatin 40 milliGRAM(s) Oral at bedtime  budesonide 160 MICROgram(s)/formoterol 4.5 MICROgram(s) Inhaler 2 Puff(s) Inhalation two times a day  furosemide   Injectable 40 milliGRAM(s) IV Push daily  metoprolol succinate ER 50 milliGRAM(s) Oral daily  pantoprazole    Tablet 40 milliGRAM(s) Oral before breakfast  petrolatum white Ointment 1 Application(s) Topical three times a day  polyethylene glycol 3350 17 Gram(s) Oral two times a day  tiotropium 2.5 MICROgram(s) Inhaler 2 Puff(s) Inhalation daily  triamcinolone 0.1% Oral Paste 1 Application(s) Topical every 12 hours    MEDICATIONS  (PRN):  acetaminophen     Tablet .. 650 milliGRAM(s) Oral every 6 hours PRN Mild Pain (1 - 3)  albuterol    90 MICROgram(s) HFA Inhaler 2 Puff(s) Inhalation every 6 hours PRN Shortness of Breath and/or Wheezing  aluminum hydroxide/magnesium hydroxide/simethicone Suspension 30 milliLiter(s) Oral every 4 hours PRN Dyspepsia  melatonin 3 milliGRAM(s) Oral at bedtime PRN Insomnia  ondansetron Injectable 4 milliGRAM(s) IV Push every 8 hours PRN Nausea and/or Vomiting    Vital Signs Last 24 Hrs  T(F): 98.2 (11 Feb 2024 05:12), Max: 98.2 (11 Feb 2024 05:12)  HR: 73 (11 Feb 2024 05:12) (68 - 76)  BP: 132/69 (11 Feb 2024 05:12) (117/63 - 132/69)  RR: 17 (11 Feb 2024 05:12) (16 - 18)  SpO2: 91% (11 Feb 2024 05:12) (91% - 96%)  I&O's Summary    10 Feb 2024 07:01  -  11 Feb 2024 07:00  --------------------------------------------------------  IN: 0 mL / OUT: 900 mL / NET: -900 mL      PHYSICAL EXAM:  General: NAD, A/O x 3, appropriate   ENT: No gross hearing impairment, Moist mucous membranes, no thrush  Neck: Supple, No JVD  Lungs: diminished bilaterally, good air entry, non-labored breathing  Cardio: RRR, S1/S2, No murmur  Abdomen: Soft, Nontender, Nondistended; Bowel sounds present  Extremities: No calf tenderness, No cyanosis, No pitting edema  Psych: Appropriate mood and affect    LABS:                        10.2   8.08  )-----------( 156      ( 10 Feb 2024 06:55 )             32.6     02-11    141  |  100  |  56  ----------------------------<  112  4.4   |  34  |  1.70    Ca    8.9      11 Feb 2024 07:20  Mg     2.4     02-11                      02-07 Chol 103 mg/dL LDL -- HDL 48 mg/dL Trig 61 mg/dL                  Urinalysis Basic - ( 11 Feb 2024 07:20 )    Color: x / Appearance: x / SG: x / pH: x  Gluc: 112 mg/dL / Ketone: x  / Bili: x / Urobili: x   Blood: x / Protein: x / Nitrite: x   Leuk Esterase: x / RBC: x / WBC x   Sq Epi: x / Non Sq Epi: x / Bacteria: x        Culture - Body Fluid with Gram Stain (collected 08 Feb 2024 12:40)  Source: .Body Fluid Other, Pleural Fluid  Gram Stain (09 Feb 2024 00:58):    No polymorphonuclear leukocytes seen per low power field    No organisms seen per oil power field  Preliminary Report (09 Feb 2024 16:08):    No growth to date.    Culture - Fungal, Body Fluid (collected 08 Feb 2024 12:40)  Source: Pleural Fl Pleural Fluid  Preliminary Report (10 Feb 2024 15:04):    Culture is being performed. Fungal cultures are held for 4 weeks.        RADIOLOGY & ADDITIONAL TESTS:    Care Discussed with Consultants/Other Providers:    Patient is a 89y old  Male who presents with a chief complaint of Shortness of breath (11 Feb 2024 09:54)      Patient seen and examined at bedside. No overnight events reported. Patient seen sitting up in chair     ALLERGIES:  No Known Allergies    MEDICATIONS  (STANDING):  allopurinol 100 milliGRAM(s) Oral daily  apixaban 2.5 milliGRAM(s) Oral every 12 hours  aspirin enteric coated 81 milliGRAM(s) Oral daily  atorvastatin 40 milliGRAM(s) Oral at bedtime  budesonide 160 MICROgram(s)/formoterol 4.5 MICROgram(s) Inhaler 2 Puff(s) Inhalation two times a day  furosemide   Injectable 40 milliGRAM(s) IV Push daily  metoprolol succinate ER 50 milliGRAM(s) Oral daily  pantoprazole    Tablet 40 milliGRAM(s) Oral before breakfast  petrolatum white Ointment 1 Application(s) Topical three times a day  polyethylene glycol 3350 17 Gram(s) Oral two times a day  tiotropium 2.5 MICROgram(s) Inhaler 2 Puff(s) Inhalation daily  triamcinolone 0.1% Oral Paste 1 Application(s) Topical every 12 hours    MEDICATIONS  (PRN):  acetaminophen     Tablet .. 650 milliGRAM(s) Oral every 6 hours PRN Mild Pain (1 - 3)  albuterol    90 MICROgram(s) HFA Inhaler 2 Puff(s) Inhalation every 6 hours PRN Shortness of Breath and/or Wheezing  aluminum hydroxide/magnesium hydroxide/simethicone Suspension 30 milliLiter(s) Oral every 4 hours PRN Dyspepsia  melatonin 3 milliGRAM(s) Oral at bedtime PRN Insomnia  ondansetron Injectable 4 milliGRAM(s) IV Push every 8 hours PRN Nausea and/or Vomiting    Vital Signs Last 24 Hrs  T(F): 98.2 (11 Feb 2024 05:12), Max: 98.2 (11 Feb 2024 05:12)  HR: 73 (11 Feb 2024 05:12) (68 - 76)  BP: 132/69 (11 Feb 2024 05:12) (117/63 - 132/69)  RR: 17 (11 Feb 2024 05:12) (16 - 18)  SpO2: 91% (11 Feb 2024 05:12) (91% - 96%)  I&O's Summary    10 Feb 2024 07:01  -  11 Feb 2024 07:00  --------------------------------------------------------  IN: 0 mL / OUT: 900 mL / NET: -900 mL      PHYSICAL EXAM:  General: NAD, A/O x 3, appropriate   ENT: No gross hearing impairment, Moist mucous membranes, no thrush  Neck: Supple, No JVD  Lungs: diminished bilaterally, good air entry, non-labored breathing  Cardio: RRR, S1/S2, No murmur  Abdomen: Soft, Nontender, Nondistended; Bowel sounds present  Extremities: No calf tenderness, No cyanosis, No pitting edema  Psych: Appropriate mood and affect    LABS:                        10.2   8.08  )-----------( 156      ( 10 Feb 2024 06:55 )             32.6     02-11    141  |  100  |  56  ----------------------------<  112  4.4   |  34  |  1.70    Ca    8.9      11 Feb 2024 07:20  Mg     2.4     02-11    02-07 Chol 103 mg/dL LDL -- HDL 48 mg/dL Trig 61 mg/dL    Urinalysis Basic - ( 11 Feb 2024 07:20 )    Color: x / Appearance: x / SG: x / pH: x  Gluc: 112 mg/dL / Ketone: x  / Bili: x / Urobili: x   Blood: x / Protein: x / Nitrite: x   Leuk Esterase: x / RBC: x / WBC x   Sq Epi: x / Non Sq Epi: x / Bacteria: x    Culture - Body Fluid with Gram Stain (collected 08 Feb 2024 12:40)  Source: .Body Fluid Other, Pleural Fluid  Gram Stain (09 Feb 2024 00:58):    No polymorphonuclear leukocytes seen per low power field    No organisms seen per oil power field  Preliminary Report (09 Feb 2024 16:08):    No growth to date.    Culture - Fungal, Body Fluid (collected 08 Feb 2024 12:40)  Source: Pleural Fl Pleural Fluid  Preliminary Report (10 Feb 2024 15:04):    Culture is being performed. Fungal cultures are held for 4 weeks.

## 2024-02-12 NOTE — PROGRESS NOTE ADULT - ASSESSMENT
89 year old male with PMH of afib on Eliquis, CHF (EF 45-50%, combined systolic and diastolic), CVA, HTN, COPD, CKD3, multiple hospitalizations for CHF exacerbation in the prior months. Patient also recently had a left sided pleural effusion that was drained last hospitalization.  CXR on this admission w/ large effusion. Patient to be admitted for CHF exacerbation.     #Acute on chronic hypoxic respiratory failure 2/2 bloody large left pleural effusion  - pt with multiple admissions recently and with drainage of pleural effusions  - s/p Thoracentesis on 12/8, 1 L drained, bloody per Dr. Ortez  - pending tx to [facility] for Pleurx   - Echo 2/7: EF 55-60%, severely enlarged right/left atriums, pulm htn, mod mitral valve stenosis  - currently on RA   - CXR: Heart enlargement and quite large left effusion  - continue lasix 40 mg. IV daily  - Troponin neg, probnp 3572   - on fluid restriction 1L daily  - Cardiology following; cont current meds  - Farxiga held while on IV lasix  - Palliative consulted for GOC; pt is currently full code     #CT chest finding   #2.5 x 1.4 cm right retroareolar breast nodule larger than 9/19/2023   - Appreciate surgical team recommendations  - unable to obtain imaging at [facility], plan to follow up outpatient   - f/u at [facility]     #left lower ext blister   - continue local wound care   - wound care instructions from last hospitalization: Clean with NS (clean in circular motions with NS and gauze). Pat dry. Apply liquid barrier film to periwound skin, allow to dry. Place non adherent silicone layer dressing (Adaptic touch) over bases of wound, cover with Aqu[employer] hydrofiber sheet, cover with ABD pad and kerlix, secure with paper tape. Change daily or PRN if soiled.    #Hx of CVA   #HTN  #CHF; chronic   #Afib; chronic   - no deficits noted   - continue statin, metoprolol, ASA  - heparin gtt at 1200 units/hr per cardio rec, follow PTT      #COPD  - no home o2, but patient reports he uses oxygen cans OTC he buys from store   - continue albuterol prn  - continue spiriva, symbicort     #IRINEO 2/2 CKD3   - Cr. 1.7, around baseline   - Trend Cr.   - avoid nephrotoxins    #Hypernatremia; resolved  #Hypokalemia; 2/2 to Lasix  - K repleted   - cont to monitor bmp    #DVT ppx: heparin gtt     GOC - full code     Dispo: pending transfer to [facility]    Updated family at bedside  89 year old male with PMH of afib on Eliquis, CHF (EF 45-50%, combined systolic and diastolic), CVA, HTN, COPD, CKD3, multiple hospitalizations for CHF exacerbation in the prior months. Patient also recently had a left sided pleural effusion that was drained last hospitalization.  CXR on this admission w/ large effusion. Patient to be admitted for CHF exacerbation.     #Acute on chronic hypoxic respiratory failure 2/2 bloody large left pleural effusion  - pt with multiple admissions recently and with drainage of pleural effusions  - s/p Thoracentesis on 12/8, 1 L drained, bloody per Dr. Hernandez  - pending tx to [facility] for Pleurx   - Echo 2/7: EF 55-60%, severely enlarged right/left atriums, pulm htn, mod mitral valve stenosis  - currently on RA   - CXR: Heart enlargement and quite large left effusion  - continue lasix 40 mg. IV daily  - Troponin neg, probnp 3572   - on fluid restriction 1L daily  - Cardiology following; cont current meds  - Farxiga held while on IV lasix  - Palliative consulted for GOC; pt is currently full code     #CT chest finding   #2.5 x 1.4 cm right retroareolar breast nodule larger than 9/19/2023   - Appreciate surgical team recommendations  - unable to obtain imaging at [facility], plan to follow up outpatient   - f/u at [facility]     #left lower ext blister   - continue local wound care   - wound care instructions from last hospitalization: Clean with NS (clean in circular motions with NS and gauze). Pat dry. Apply liquid barrier film to periwound skin, allow to dry. Place non adherent silicone layer dressing (Adaptic touch) over bases of wound, cover with Aquacel AG hydrofiber sheet, cover with ABD pad and kerlix, secure with paper tape. Change daily or PRN if soiled.    #Hx of CVA   #HTN  #CHF; chronic   #Afib; chronic   - no deficits noted   - continue statin, metoprolol, ASA  - heparin gtt at 1200 units/hr per, follow PTT/cbc      #COPD  - no home o2, but patient reports he uses oxygen cans OTC he buys from store   - continue albuterol prn  - continue spiriva, symbicort     #IRINEO 2/2 CKD3   - Cr. 1.7, around baseline   - Trend Cr.   - avoid nephrotoxins    #Hypernatremia; resolved  #Hypokalemia; 2/2 to Lasix  - K repleted   - cont to monitor bmp    #DVT ppx: heparin gtt     GOC - full code     Dispo: pending transfer to [facility]    Updated family at bedside  89 year old male with PMH of afib on Eliquis, CHF (EF 45-50%, combined systolic and diastolic), CVA, HTN, COPD, CKD3, multiple hospitalizations for CHF exacerbation in the prior months. Patient also recently had a left sided pleural effusion that was drained last hospitalization.  CXR on this admission w/ large effusion. Patient to be admitted for CHF exacerbation.     #Acute on chronic hypoxic respiratory failure 2/2 bloody large left pleural effusion  - pt with multiple admissions recently and with drainage of pleural effusions  - s/p Thoracentesis on 2/8, 1 L drained, bloody per Dr. Hernandez  - pending tx to [facility] for Pleurx   - Echo 2/7: EF 55-60%, severely enlarged right/left atriums, pulm htn, mod mitral valve stenosis  - currently on RA   - CXR: Heart enlargement and quite large left effusion  - continue lasix 40 mg. IV daily  - Troponin neg, probnp 3572   - on fluid restriction 1L daily  - Cardiology following; cont current meds  - Farxiga held while on IV lasix  - Palliative consulted for GOC; pt is currently full code     #CT chest finding   #2.5 x 1.4 cm right retroareolar breast nodule larger than 9/19/2023   - Appreciate surgical team recommendations  - unable to obtain imaging at [facility], plan to follow up outpatient   - f/u at [facility]     #left lower ext blister   - continue local wound care   - wound care instructions from last hospitalization: Clean with NS (clean in circular motions with NS and gauze). Pat dry. Apply liquid barrier film to periwound skin, allow to dry. Place non adherent silicone layer dressing (Adaptic touch) over bases of wound, cover with Aquacel AG hydrofiber sheet, cover with ABD pad and kerlix, secure with paper tape. Change daily or PRN if soiled.    #Hx of CVA   #HTN  #CHF; chronic   #Afib; chronic   - no deficits noted   - continue statin, metoprolol, ASA  - heparin gtt at 1200 units/hr per, follow PTT/cbc      #COPD  - no home o2, but patient reports he uses oxygen cans OTC he buys from store   - continue albuterol prn  - continue spiriva, symbicort     #IRINEO 2/2 CKD3   - Cr. 1.7, around baseline   - Trend Cr.   - avoid nephrotoxins    #Hypernatremia; resolved  #Hypokalemia; 2/2 to Lasix  - K repleted   - cont to monitor bmp    #DVT ppx: heparin gtt     GOC - full code     Dispo: pending transfer to [facility]    Updated family at bedside

## 2024-02-12 NOTE — PROGRESS NOTE ADULT - SUBJECTIVE AND OBJECTIVE BOX
Follow-up Pulmonary Progress Note  Chief Complaint : Heart failure      patient seen and examined  comfortable on room air, n ocp, sob, palp, n/v  questions answered    Allergies :No Known Allergies      PAST MEDICAL & SURGICAL HISTORY:  Afib    Congestive heart failure (CHF)    CVA (cerebral vascular accident)    Hypertension, unspecified type    Chronic obstructive pulmonary disease (COPD)    No significant past surgical history        Medications:  MEDICATIONS  (STANDING):  allopurinol 100 milliGRAM(s) Oral daily  aspirin enteric coated 81 milliGRAM(s) Oral daily  atorvastatin 40 milliGRAM(s) Oral at bedtime  budesonide 160 MICROgram(s)/formoterol 4.5 MICROgram(s) Inhaler 2 Puff(s) Inhalation two times a day  furosemide   Injectable 40 milliGRAM(s) IV Push daily  heparin  Infusion.  Unit(s)/Hr (12 mL/Hr) IV Continuous <Continuous>  metoprolol succinate ER 50 milliGRAM(s) Oral daily  pantoprazole    Tablet 40 milliGRAM(s) Oral before breakfast  petrolatum white Ointment 1 Application(s) Topical three times a day  polyethylene glycol 3350 17 Gram(s) Oral two times a day  tiotropium 2.5 MICROgram(s) Inhaler 2 Puff(s) Inhalation daily  triamcinolone 0.1% Oral Paste 1 Application(s) Topical every 12 hours    MEDICATIONS  (PRN):  acetaminophen     Tablet .. 650 milliGRAM(s) Oral every 6 hours PRN Mild Pain (1 - 3)  albuterol    90 MICROgram(s) HFA Inhaler 2 Puff(s) Inhalation every 6 hours PRN Shortness of Breath and/or Wheezing  aluminum hydroxide/magnesium hydroxide/simethicone Suspension 30 milliLiter(s) Oral every 4 hours PRN Dyspepsia  heparin   Injectable 5500 Unit(s) IV Push every 6 hours PRN For aPTT less than 40  heparin   Injectable 2500 Unit(s) IV Push every 6 hours PRN For aPTT between 40 - 57  melatonin 3 milliGRAM(s) Oral at bedtime PRN Insomnia  ondansetron Injectable 4 milliGRAM(s) IV Push every 8 hours PRN Nausea and/or Vomiting      Antibiotics History      Heme Medications   aspirin enteric coated 81 milliGRAM(s) Oral daily, 02-06-24 @ 18:13  heparin   Injectable 2500 Unit(s) IV Push every 6 hours, 02-11-24 @ 18:00 PRN  heparin   Injectable 5500 Unit(s) IV Push every 6 hours, 02-11-24 @ 18:00 PRN  heparin  Infusion.  Unit(s)/Hr IV Continuous <Continuous>, 02-11-24 @ 18:00      GI Medications  aluminum hydroxide/magnesium hydroxide/simethicone Suspension 30 milliLiter(s) Oral every 4 hours, 02-06-24 @ 18:44, Routine PRN  pantoprazole    Tablet 40 milliGRAM(s) Oral before breakfast, 02-06-24 @ 18:17, Routine  polyethylene glycol 3350 17 Gram(s) Oral two times a day, 02-06-24 @ 18:16, Routine      COVID  02-06-24 @ 18:22  COVID -   NotDetec  12-18-23 @ 10:15  COVID -   NotDetec  11-22-23 @ 13:35  COVID -   NotDetec  10-15-23 @ 22:50  COVID -   NotDetec  09-18-23 @ 19:50  COVID -   NotDetec  01-02-23 @ 10:55  COVID -   NotDetec  01-02-23 @ 06:00  COVID -   NotDetec  12-17-22 @ 07:00  COVID -   NotDetec  06-15-21 @ 13:45  COVID -   NotDetec  06-12-21 @ 20:51  COVID -   NotDetec  02-17-21 @ 19:00  COVID -   Detected<!!>      COVID Biomarkers    02-09-24 @ 06:35 ESR --  ---  CRP --  ---  DDimer  --   ---      ---   Ferritin --    01-02-24 @ 07:15 ESR --  ---  CRP --  ---  DDimer  --   ---   LDH --   ---   Ferritin 298    12-20-23 @ 05:57 ESR --  ---  CRP --  ---  DDimer  --   ---   <H>   ---   Ferritin --            Trend Cardiac Enzymes    Trend BNP  02-06-24 @ 16:22   -  3572<H>  01-01-24 @ 06:30   -  2829<H>  12-18-23 @ 10:15   -  2604<H>  11-27-23 @ 06:42   -  3970<H>  11-25-23 @ 05:45   -  3595<H>  11-22-23 @ 13:35   -  3788<H>    Procalcitonin Trend  12-26-22 @ 05:00   -   0.20<H>  12-24-22 @ 06:00   -   0.32<H>  12-23-22 @ 16:15   -   0.28<H>  12-23-22 @ 06:35   -   0.25<H>  12-17-22 @ 19:30   -   0.23<H>    WBC Trend  02-12-24 @ 06:58   -  8.72  02-12-24 @ 00:40   -  8.69  02-10-24 @ 06:55   -  8.08    H/H Trend  02-12-24 @ 06:58   -   11.2<L>/ 34.3<L>  02-12-24 @ 00:40   -   9.6<L>/ 29.8<L>  02-10-24 @ 06:55   -   10.2<L>/ 32.6<L>  02-09-24 @ 06:35   -   10.1<L>/ 32.2<L>  02-08-24 @ 06:25   -   10.4<L>/ 32.7<L>  02-07-24 @ 06:49   -   10.6<L>/ 33.9<L>    Stool Occult Blood  01-09-23 @ 12:22   -   Negative  12-19-22 @ 18:10   -   Positive<!>    Platelet Trend  02-12-24 @ 06:58   -  175  02-12-24 @ 00:40   -  160  02-10-24 @ 06:55   -  156    Trend Sodium  02-12-24 @ 06:58   -  139  02-11-24 @ 07:20   -  141  02-10-24 @ 06:55   -  142    Trend Potassium  02-12-24 @ 06:58   -  4.4  02-11-24 @ 07:20   -  4.4  02-10-24 @ 06:55   -  3.4<L>    Trend Bun/Cr  02-12-24 @ 06:58  BUN/CR -  58<H> / 1.72<H>  02-11-24 @ 07:20  BUN/CR -  56<H> / 1.70<H>  02-10-24 @ 06:55  BUN/CR -  53<H> / 1.77<H>    Lactic Acid Trend    ABG Trend  10-16-23 @ 05:30   - 7.46<H>/44/72<L>/95.4  12-17-22 @ 10:35   - 7.37/45/87/97.0  09-30-19 @ 04:38   - 7.41/35/305<H>/>99<H>  09-30-19 @ 01:28   - 7.18<LL>/57<H>/103/95    Trend AST/ALT/ALK Phos/Bili  02-07-24 @ 06:49   17/10/68/0.7  02-06-24 @ 16:22   22/11/73/0.7  01-05-24 @ 06:39   42<H>/68<H>/151<H>/0.5  01-04-24 @ 07:46   62<H>/94<H>/169<H>/0.5  01-03-24 @ 06:46   105<H>/125<H>/183<H>/0.5  01-02-24 @ 08:51   197<H>/165<H>/216<H>/0.8  01-02-24 @ 07:15   197<H>/172<H>/211<H>/0.7  01-01-24 @ 06:30   316<H>/161<H>/198<H>/1.1  12-30-23 @ 07:30   22/19/62/0.6  12-29-23 @ 07:13   24/18/62/0.5  12-28-23 @ 05:00   23/16/61/0.6  12-27-23 @ 05:33   22/19/62/0.5      Ammonia Trend      Amylase / Lipase Trend  01-02-24 @ 07:15   -   -- / 39      Albumin Trend  02-07-24 @ 06:49   -   2.6<L>  02-06-24 @ 16:22   -   2.9<L>  01-05-24 @ 06:39   -   3.0<L>  01-04-24 @ 07:46   -   3.3  01-03-24 @ 06:46   -   3.3  01-02-24 @ 08:51   -   3.5      PTT - PT - INR Trend  02-12-24 @ 06:58   -   107.8 - -- - --  02-12-24 @ 00:55   -   83.2<H> - -- - --  02-11-24 @ 11:10   -   33.1 - -- - --  02-07-24 @ 06:49   -   -- - 17.7<H> - 1.57<H>  01-04-24 @ 07:46   -   34.8 - 12.9 - 1.15  01-03-24 @ 06:46   -   32.4 - 13.1<H> - 1.18    Glucose Trend  02-12-24 @ 06:58   -  119<H> -- --  02-11-24 @ 07:20   -  112<H> -- --  02-10-24 @ 06:55   -  134<H> -- --    A1C with Estimated Average Glucose Result: 5.9 % *H* [4.0 - 5.6] (02-07-24 @ 06:49)  A1C with Estimated Average Glucose Result: 5.8 % *H* [4.0 - 5.6] (01-03-24 @ 06:46)  A1C with Estimated Average Glucose Result: 5.8 % *H* [4.0 - 5.6] (09-20-23 @ 09:53)  A1C with Estimated Average Glucose Result: 5.8 % *H* [4.0 - 5.6] (09-19-23 @ 07:28)      LABS:                        11.2   8.72  )-----------( 175      ( 12 Feb 2024 06:58 )             34.3     02-12    139  |  97  |  58<H>  ----------------------------<  119<H>  4.4   |  34<H>  |  1.72<H>    Ca    8.9      12 Feb 2024 06:58  Mg     2.6     02-12      Fluid characteristics  -- 02-08 @ 12:40  pH 7.4    tprot 5.0    Cell count  Appearance Bloody  Fluid type --  BF lymph 57  color Red  eosinophil 1  PMN --  Mesothelial 8  Monocyte 29  Other body cells --          PTT - ( 12 Feb 2024 06:58 )  PTT:107.8 sec  Urinalysis Basic - ( 12 Feb 2024 06:58 )    Color: x / Appearance: x / SG: x / pH: x  Gluc: 119 mg/dL / Ketone: x  / Bili: x / Urobili: x   Blood: x / Protein: x / Nitrite: x   Leuk Esterase: x / RBC: x / WBC x   Sq Epi: x / Non Sq Epi: x / Bacteria: x              CULTURES: (if applicable)    Culture - Body Fluid with Gram Stain (collected 02-08-24 @ 12:40)  Source: .Body Fluid Other, Pleural Fluid  Gram Stain (02-09-24 @ 00:58):    No polymorphonuclear leukocytes seen per low power field    No organisms seen per oil power field  Preliminary Report (02-09-24 @ 16:08):    No growth to date.    Culture - Fungal, Body Fluid (collected 02-08-24 @ 12:40)  Source: Pleural Fl Pleural Fluid  Preliminary Report (02-10-24 @ 15:04):    Culture is being performed. Fungal cultures are held for 4 weeks.      Rapid RVP Result: NotDetec (02-06-24 @ 18:22)         VITALS:  T(C): 36.6 (02-12-24 @ 11:54), Max: 36.7 (02-11-24 @ 20:30)  T(F): 97.8 (02-12-24 @ 11:54), Max: 98 (02-11-24 @ 20:30)  HR: 60 (02-12-24 @ 11:54) (60 - 62)  BP: 119/62 (02-12-24 @ 11:54) (119/62 - 131/64)  BP(mean): --  ABP: --  ABP(mean): --  RR: 18 (02-12-24 @ 11:54) (17 - 18)  SpO2: 95% (02-12-24 @ 11:54) (93% - 95%)  CVP(mm Hg): --  CVP(cm H2O): --    Ins and Outs     02-12-24 @ 07:01  -  02-12-24 @ 14:10  --------------------------------------------------------  IN: 120 mL / OUT: 0 mL / NET: 120 mL                I&O's Detail    12 Feb 2024 07:01  -  12 Feb 2024 14:10  --------------------------------------------------------  IN:    Oral Fluid: 120 mL  Total IN: 120 mL    OUT:  Total OUT: 0 mL    Total NET: 120 mL

## 2024-02-12 NOTE — PROGRESS NOTE ADULT - SUBJECTIVE AND OBJECTIVE BOX
Patient is a 89y old  Male who presents with a chief complaint of Shortness of breath (12 Feb 2024 10:07)      Patient seen and examined at bedside. No overnight events reported. Patient stated he was feeling better     ALLERGIES:  No Known Allergies    MEDICATIONS  (STANDING):  allopurinol 100 milliGRAM(s) Oral daily  aspirin enteric coated 81 milliGRAM(s) Oral daily  atorvastatin 40 milliGRAM(s) Oral at bedtime  budesonide 160 MICROgram(s)/formoterol 4.5 MICROgram(s) Inhaler 2 Puff(s) Inhalation two times a day  furosemide   Injectable 40 milliGRAM(s) IV Push daily  heparin  Infusion.  Unit(s)/Hr (12 mL/Hr) IV Continuous <Continuous>  metoprolol succinate ER 50 milliGRAM(s) Oral daily  pantoprazole    Tablet 40 milliGRAM(s) Oral before breakfast  petrolatum white Ointment 1 Application(s) Topical three times a day  polyethylene glycol 3350 17 Gram(s) Oral two times a day  tiotropium 2.5 MICROgram(s) Inhaler 2 Puff(s) Inhalation daily  triamcinolone 0.1% Oral Paste 1 Application(s) Topical every 12 hours    MEDICATIONS  (PRN):  acetaminophen     Tablet .. 650 milliGRAM(s) Oral every 6 hours PRN Mild Pain (1 - 3)  albuterol    90 MICROgram(s) HFA Inhaler 2 Puff(s) Inhalation every 6 hours PRN Shortness of Breath and/or Wheezing  aluminum hydroxide/magnesium hydroxide/simethicone Suspension 30 milliLiter(s) Oral every 4 hours PRN Dyspepsia  heparin   Injectable 5500 Unit(s) IV Push every 6 hours PRN For aPTT less than 40  heparin   Injectable 2500 Unit(s) IV Push every 6 hours PRN For aPTT between 40 - 57  melatonin 3 milliGRAM(s) Oral at bedtime PRN Insomnia  ondansetron Injectable 4 milliGRAM(s) IV Push every 8 hours PRN Nausea and/or Vomiting    Vital Signs Last 24 Hrs  T(F): 97.9 (12 Feb 2024 05:10), Max: 98 (11 Feb 2024 20:30)  HR: 62 (12 Feb 2024 05:10) (61 - 62)  BP: 131/64 (12 Feb 2024 05:10) (121/66 - 131/64)  RR: 18 (12 Feb 2024 05:10) (17 - 18)  SpO2: 95% (12 Feb 2024 05:10) (92% - 95%)  I&O's Summary    PHYSICAL EXAM:  General: NAD, A/O x 3  ENT: No gross hearing impairment, Moist mucous membranes, no thrush  Neck: Supple, No JVD  Lungs: diminished to auscultation bilaterally, good air entry, non-labored breathing  Cardio: RRR, S1/S2, No murmur  Abdomen: Soft, Nontender, Nondistended; Bowel sounds present  Extremities: No calf tenderness, No cyanosis, No pitting edema  Psych: Appropriate mood and affect    LABS:                        11.2   8.72  )-----------( 175      ( 12 Feb 2024 06:58 )             34.3     02-12    139  |  97  |  58  ----------------------------<  119  4.4   |  34  |  1.72    Ca    8.9      12 Feb 2024 06:58  Mg     2.6     02-12            PTT - ( 12 Feb 2024 06:58 )  PTT:107.8 sec          02-07 Chol 103 mg/dL LDL -- HDL 48 mg/dL Trig 61 mg/dL                  Urinalysis Basic - ( 12 Feb 2024 06:58 )    Color: x / Appearance: x / SG: x / pH: x  Gluc: 119 mg/dL / Ketone: x  / Bili: x / Urobili: x   Blood: x / Protein: x / Nitrite: x   Leuk Esterase: x / RBC: x / WBC x   Sq Epi: x / Non Sq Epi: x / Bacteria: x        Culture - Body Fluid with Gram Stain (collected 08 Feb 2024 12:40)  Source: .Body Fluid Other, Pleural Fluid  Gram Stain (09 Feb 2024 00:58):    No polymorphonuclear leukocytes seen per low power field    No organisms seen per oil power field  Preliminary Report (09 Feb 2024 16:08):    No growth to date.    Culture - Fungal, Body Fluid (collected 08 Feb 2024 12:40)  Source: Pleural Fl Pleural Fluid  Preliminary Report (10 Feb 2024 15:04):    Culture is being performed. Fungal cultures are held for 4 weeks.        RADIOLOGY & ADDITIONAL TESTS:    Care Discussed with Consultants/Other Providers:    Patient is a 89y old  Male who presents with a chief complaint of Shortness of breath (12 Feb 2024 10:07)      Patient seen and examined at bedside. No overnight events reported. Patient stated he was feeling better     ALLERGIES:  No Known Allergies    MEDICATIONS  (STANDING):  allopurinol 100 milliGRAM(s) Oral daily  aspirin enteric coated 81 milliGRAM(s) Oral daily  atorvastatin 40 milliGRAM(s) Oral at bedtime  budesonide 160 MICROgram(s)/formoterol 4.5 MICROgram(s) Inhaler 2 Puff(s) Inhalation two times a day  furosemide   Injectable 40 milliGRAM(s) IV Push daily  heparin  Infusion.  Unit(s)/Hr (12 mL/Hr) IV Continuous <Continuous>  metoprolol succinate ER 50 milliGRAM(s) Oral daily  pantoprazole    Tablet 40 milliGRAM(s) Oral before breakfast  petrolatum white Ointment 1 Application(s) Topical three times a day  polyethylene glycol 3350 17 Gram(s) Oral two times a day  tiotropium 2.5 MICROgram(s) Inhaler 2 Puff(s) Inhalation daily  triamcinolone 0.1% Oral Paste 1 Application(s) Topical every 12 hours    MEDICATIONS  (PRN):  acetaminophen     Tablet .. 650 milliGRAM(s) Oral every 6 hours PRN Mild Pain (1 - 3)  albuterol    90 MICROgram(s) HFA Inhaler 2 Puff(s) Inhalation every 6 hours PRN Shortness of Breath and/or Wheezing  aluminum hydroxide/magnesium hydroxide/simethicone Suspension 30 milliLiter(s) Oral every 4 hours PRN Dyspepsia  heparin   Injectable 5500 Unit(s) IV Push every 6 hours PRN For aPTT less than 40  heparin   Injectable 2500 Unit(s) IV Push every 6 hours PRN For aPTT between 40 - 57  melatonin 3 milliGRAM(s) Oral at bedtime PRN Insomnia  ondansetron Injectable 4 milliGRAM(s) IV Push every 8 hours PRN Nausea and/or Vomiting    Vital Signs Last 24 Hrs  T(F): 97.9 (12 Feb 2024 05:10), Max: 98 (11 Feb 2024 20:30)  HR: 62 (12 Feb 2024 05:10) (61 - 62)  BP: 131/64 (12 Feb 2024 05:10) (121/66 - 131/64)  RR: 18 (12 Feb 2024 05:10) (17 - 18)  SpO2: 95% (12 Feb 2024 05:10) (92% - 95%)  I&O's Summary    PHYSICAL EXAM:  General: NAD, A/O x 3  ENT: No gross hearing impairment, Moist mucous membranes, no thrush  Neck: Supple, No JVD  Lungs: diminished to auscultation bilaterally, good air entry, non-labored breathing  Cardio: RRR, S1/S2, No murmur  Abdomen: Soft, Nontender, Nondistended; Bowel sounds present  Extremities: No calf tenderness, No cyanosis, No pitting edema  Psych: Appropriate mood and affect    LABS:                        11.2   8.72  )-----------( 175      ( 12 Feb 2024 06:58 )             34.3     02-12    139  |  97  |  58  ----------------------------<  119  4.4   |  34  |  1.72    Ca    8.9      12 Feb 2024 06:58  Mg     2.6     02-12    PTT - ( 12 Feb 2024 06:58 )  PTT:107.8 sec    02-07 Chol 103 mg/dL LDL -- HDL 48 mg/dL Trig 61 mg/dL    Urinalysis Basic - ( 12 Feb 2024 06:58 )    Color: x / Appearance: x / SG: x / pH: x  Gluc: 119 mg/dL / Ketone: x  / Bili: x / Urobili: x   Blood: x / Protein: x / Nitrite: x   Leuk Esterase: x / RBC: x / WBC x   Sq Epi: x / Non Sq Epi: x / Bacteria: x    Culture - Body Fluid with Gram Stain (collected 08 Feb 2024 12:40)  Source: .Body Fluid Other, Pleural Fluid  Gram Stain (09 Feb 2024 00:58):    No polymorphonuclear leukocytes seen per low power field    No organisms seen per oil power field  Preliminary Report (09 Feb 2024 16:08):    No growth to date.    Culture - Fungal, Body Fluid (collected 08 Feb 2024 12:40)  Source: Pleural Fl Pleural Fluid  Preliminary Report (10 Feb 2024 15:04):    Culture is being performed. Fungal cultures are held for 4 weeks.

## 2024-02-12 NOTE — PROGRESS NOTE ADULT - SUBJECTIVE AND OBJECTIVE BOX
RAD SINGLETON  [id number]      Chief Complaint: Acute on chronic diastolic heart failure exacerbation/Moderate mitral stenosis     Interval History: The patient reports that breathing is stable, urinating well, denies chest discomfort.     Tele: atrial fibrillation 70s BPM    Current meds:   acetaminophen     Tablet .. 650 milliGRAM(s) Oral every 6 hours PRN  albuterol    90 MICROgram(s) HFA Inhaler 2 Puff(s) Inhalation every 6 hours PRN  allopurinol 100 milliGRAM(s) Oral daily  aluminum hydroxide/magnesium hydroxide/simethicone Suspension 30 milliLiter(s) Oral every 4 hours PRN  aspirin enteric coated 81 milliGRAM(s) Oral daily  atorvastatin 40 milliGRAM(s) Oral at bedtime  budesonide 160 MICROgram(s)/formoterol 4.5 MICROgram(s) Inhaler 2 Puff(s) Inhalation two times a day  furosemide   Injectable 40 milliGRAM(s) IV Push daily  heparin   Injectable 5500 Unit(s) IV Push every 6 hours PRN  heparin   Injectable 2500 Unit(s) IV Push every 6 hours PRN  heparin  Infusion.  Unit(s)/Hr IV Continuous <Continuous>  melatonin 3 milliGRAM(s) Oral at bedtime PRN  metoprolol succinate ER 50 milliGRAM(s) Oral daily  ondansetron Injectable 4 milliGRAM(s) IV Push every 8 hours PRN  pantoprazole    Tablet 40 milliGRAM(s) Oral before breakfast  petrolatum white Ointment 1 Application(s) Topical three times a day  polyethylene glycol 3350 17 Gram(s) Oral two times a day  tiotropium 2.5 MICROgram(s) Inhaler 2 Puff(s) Inhalation daily  triamcinolone 0.1% Oral Paste 1 Application(s) Topical every 12 hours      Objective:     Vital Signs:   T(C): 36.6 (02-12-24 @ 05:10), Max: 36.7 (02-11-24 @ 20:30)  HR: 62 (02-12-24 @ 05:10) (61 - 62)  BP: 131/64 (02-12-24 @ 05:10) (121/66 - 131/64)  RR: 18 (02-12-24 @ 05:10) (17 - 18)  SpO2: 95% (02-12-24 @ 05:10) (92% - 95%)  Wt(kg): --      Physical Exam:   General: no distress  Neck: supple   CVS: JVP ~ 10 cm H20, irregularly irregular, s1, s2  Pulm: decreased breath sounds  Ext: mild lower extremity edema b/l   Neuro: awake, alert  Psych: Normal affect        Labs:   12 Feb 2024 06:58    139    |  97     |  58     ----------------------------<  119    4.4     |  34     |  1.72     Ca    8.9        12 Feb 2024 06:58  Mg     2.6       12 Feb 2024 06:58                            11.2   8.72  )-----------( 175      ( 12 Feb 2024 06:58 )             34.3     PTT - ( 12 Feb 2024 06:58 )  PTT:107.8 sec        ECG (2/7/24): atrial fibrillation    TTE (2/7/24):   1. Normal global left ventricular systolic function.   2. Left ventricular ejection fraction, by visual estimation, is 55 to 60%.   3. Calculated LVEF by Simpsons Biplane Method 57%.   4. Normal left ventricular internal cavity size.   5. There is mild septal left ventricular hypertrophy.   6. Severely enlarged left atrium.   7. Severely enlarged right atrium.   8. Rheumatic mitral valve.   9. Mild thickening and calcification of the anterior and posterior   mitral valve leaflets.  10. Moderate mitral valve stenosis (peak velocity 1.9 m/s, mean gradient 8 mmHg at HR 53 BPM, MVA 1.0 cm^2).  11. Mild mitral valve regurgitation.  12. Mild-moderate tricuspid regurgitation.  13. Aortic sclerosis with borderline decreased opening.  14. Mild aortic regurgitation.  15. Estimated pulmonary artery systolic pressure is 53.9 mmHg assuming a right atrial pressure of 15 mmHg, which is consistent with moderate pulmonary hypertension.  16. Large pleural effusion in the left lateral region.

## 2024-02-12 NOTE — PROGRESS NOTE ADULT - ASSESSMENT
Physical Examination:  GENERAL:               Alert, Oriented, No acute distress.    HEENT:                   No JVD, Moist MM  PULM:                     Bilateral air entry, diminished to ausciltion on left , no significant sputum production, No Rales, No Rhonchi, No Wheezing  CVS:                         S1, S2,  +murmur  ABD:                        Soft, nondistended, nontender, normoactive bowel sounds,   EXT:                         +edema, nontender, No Cyanosis or Clubbing   NEURO:                  Alert, oriented, interactive, nonfocal, follows commands  PSYC:                      Calm, + Insight.         Assessment  Dyspnea suspect due to acute on chronic Effusion Left  underlying Diastolic CHF possible  Chronic Left pleural effusions  s/p thoracentesis 11/24/23, 2/8/24- Exudate   Abnormal CT 2.5 x 1.4 cm right retroareolar breast nodule larger than 9/19/2023. Stable prominent mediastinal lymph nodes. Redemonstrated asymmetrical gynecomastia.      Afib on a/c  Cigar smoker, at risk for copd.   Worsening renal function on CKD    Plan  pleural effusion recurrent, Exudative and serosanguinous with previously negative cytology  f/u repeat cyto results    will need out patient f/u for workup of  breast nodule  will need Pleurex and pleural biopsy, can even consider ebus if can evaluate mediastinal lymph nodes     a/c transitioned to heparin drip for possible proceudre  will need to transfer for pleurex/pleural biopsy +/- EBUS, vs out patient f/u with thoracic, but unsure if patient will follow or become symptomatic in near future  awaiting bed in [facility]      Case d/w Dr. Hull

## 2024-02-12 NOTE — PROGRESS NOTE ADULT - NS ATTEND AMEND GEN_ALL_CORE FT
2.5 x 1.4 cm right retroareolar breast nodule larger than 9/19/2023.   Appreciate surgery recommendations, o/p followup    Bloody plerual effusion, s/p thoracocentesis 2/8/24, 1L removed  Spoke with transfer center; accepted patient to [facility] pending bed availability  Patient with VHD; will stop eliquis and start heparin drip  To bridge with coumadin after [facility] evaluates for possible intervention re: effusion  C/w lasix 40mg IV daily as per cardiology    DVT PPX: ICD  AM labs.

## 2024-02-12 NOTE — PROGRESS NOTE ADULT - ASSESSMENT
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), Moderate mitral valve stenosis, Hypertension, Chronic kidney disease, COPD and history of CVA with recurrent hospitalizations for congestive heart failure likely from medication noncompliance and recent hospitalization at [facility] last month for congestive heart failure exacerbation deemed not to require chest tube placement at the time, also with moderate to severe PAD - deemed to be poor surgical candidate for lower extremity vascular intervention, now presents with progressive dyspnea, found to have acute on chronic diastolic heart failure exacerbation with large left pleural effusion.    ECG consisent with atrial fibrillation. Troponins negative x 2, does not appear to be an acute coronary syndrome. 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 The patient is now s/p thoracentesis with 1 L fluid removed.    Recommendations:  [] Acute on chronic diastolic heart failure exacerbation with chronic pleural effusions: S/p thoracentesis, patient awaiting bed availability at [facility] for Pleurx catheter. Continue IV diuresis. Continue Metoprolol succinate 50 mg daily. Plan to resume SGLT2-I when euvolemic. Follow up fluid studies and Pulmonary.  [] Moderate mitral valve stenosis: The patient would appear to be a poor candidate for MV PMBC or surgical repair at this age and also due to comorbidities, can obtain structural heart team evaluation while at [facility], likely outpatient management.   [] Atrial fibrillation: Rate controlled, continue beta blocker. Given moderate mitral stenosis the patient would likely benefit from coumadin instead, to be discussed with patient and family.     Patient awaiting bed availability at [facility] for Pleurx catheter.     Kevin Hong MD  Cardiology

## 2024-02-12 NOTE — CHART NOTE - NSCHARTNOTEFT_GEN_A_CORE
Nutrition Follow Up Note  Hospital Course (Per Electronic Medical Record):   Source: Medical Record [X] Patient [X]  Nursing Staff [X]     Diet: Renal , Nepro QD     Patient tolerating current diet po intake appears to vary due to varied appetite %, labs reviewed, dx of moderate malnutrition providing po Nepro QD due to same. Patient noted for transfer for Pleurx catheter. Current diet order appears appropriate  .      Current Weight: (2/12) 158.7/72kg                          (2/11) 158.7/72kg     Pertinent Medications: MEDICATIONS  (STANDING):  allopurinol 100 milliGRAM(s) Oral daily  aspirin enteric coated 81 milliGRAM(s) Oral daily  atorvastatin 40 milliGRAM(s) Oral at bedtime  budesonide 160 MICROgram(s)/formoterol 4.5 MICROgram(s) Inhaler 2 Puff(s) Inhalation two times a day  furosemide   Injectable 40 milliGRAM(s) IV Push daily  heparin  Infusion.  Unit(s)/Hr (12 mL/Hr) IV Continuous <Continuous>  metoprolol succinate ER 50 milliGRAM(s) Oral daily  pantoprazole    Tablet 40 milliGRAM(s) Oral before breakfast  petrolatum white Ointment 1 Application(s) Topical three times a day  polyethylene glycol 3350 17 Gram(s) Oral two times a day  tiotropium 2.5 MICROgram(s) Inhaler 2 Puff(s) Inhalation daily  triamcinolone 0.1% Oral Paste 1 Application(s) Topical every 12 hours    MEDICATIONS  (PRN):  acetaminophen     Tablet .. 650 milliGRAM(s) Oral every 6 hours PRN Mild Pain (1 - 3)  albuterol    90 MICROgram(s) HFA Inhaler 2 Puff(s) Inhalation every 6 hours PRN Shortness of Breath and/or Wheezing  aluminum hydroxide/magnesium hydroxide/simethicone Suspension 30 milliLiter(s) Oral every 4 hours PRN Dyspepsia  heparin   Injectable 5500 Unit(s) IV Push every 6 hours PRN For aPTT less than 40  heparin   Injectable 2500 Unit(s) IV Push every 6 hours PRN For aPTT between 40 - 57  melatonin 3 milliGRAM(s) Oral at bedtime PRN Insomnia  ondansetron Injectable 4 milliGRAM(s) IV Push every 8 hours PRN Nausea and/or Vomiting      Pertinent Labs:  02-12 Na139 mmol/L Glu 119 mg/dL<H> K+ 4.4 mmol/L Cr  1.72 mg/dL<H> BUN 58 mg/dL<H> 02-07 Alb 2.6 g/dL<L> 02-07 Chol 103 mg/dL LDL --    HDL 48 mg/dL Trig 61 mg/dL  Hgb 11.2g/dl<L> , Hct 34.3% <L>       Skin: LLE blister     Edema: (+2) LE     Last BM: (2/11) bowel regimen     Estimated Needs:   [X] No Change since Previous Assessment      Previous Nutrition Diagnosis: Moderate Malnutrition    Nutrition Diagnosis is [X] Ongoing & addressed         New Nutrition Diagnosis: [X] Not Applicable      Interventions:   1. continue current nutrition regimen      Monitoring & Evaluation: will monitor:  [X] Weights   [X] PO Intake   [X] Follow Up (Per Protocol)  [X] Tolerance to Diet Prescription       RD to follow as per Nutrition protocol  Lilia Valle RDN
MD called because patient feeling short of breath.   Md at bedside. VSS. Satting 97% on 5L NC.   Respirations clear throughout with decreased breath sound in left lower lobe. Patient using accessory muscles of respiration.   Will administer AM dose of Lasix now and re-eval

## 2024-02-12 NOTE — DIETITIAN NUTRITION RISK NOTIFICATION - TREATMENT: THE FOLLOWING DIET HAS BEEN RECOMMENDED
Diet, Renal Restrictions:   For patients receiving Renal Replacement - No Protein Restr, No Conc K, No Conc Phos, Low Sodium  Supplement Feeding Modality:  Oral  Nepro Cans or Servings Per Day:  1       Frequency:  Daily (02-07-24 @ 10:57) [Active]

## 2024-02-13 NOTE — PROGRESS NOTE ADULT - ASSESSMENT
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), Moderate mitral valve stenosis, Hypertension, Chronic kidney disease, COPD and history of CVA with recurrent hospitalizations for congestive heart failure likely from medication noncompliance and recent hospitalization at [facility] last month for congestive heart failure exacerbation deemed not to require chest tube placement at the time, also with moderate to severe PAD - deemed to be poor surgical candidate for lower extremity vascular intervention, now presents with progressive dyspnea, found to have acute on chronic diastolic heart failure exacerbation with large left pleural effusion.    ECG consisent with atrial fibrillation. Troponins negative x 2, does not appear to be an acute coronary syndrome. 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 The patient is now s/p thoracentesis with 1 L fluid removed.    Recommendations:  [] Acute on chronic diastolic heart failure exacerbation with chronic pleural effusions: S/p thoracentesis, patient awaiting bed availability at [facility] for Pleurx catheter. Continue IV diuresis. Continue Metoprolol succinate 50 mg daily. Plan to resume SGLT2-I when euvolemic. Follow up fluid studies and Pulmonary.  [] Moderate mitral valve stenosis: The patient would appear to be a poor candidate for MV PMBC or surgical repair at this age and also due to comorbidities, can obtain structural heart team evaluation while at [facility], likely outpatient management.   [] Atrial fibrillation: Rate controlled, continue beta blocker. Given moderate mitral stenosis the patient would likely benefit from coumadin instead, to be discussed with patient and family. Patient currently on heparin drip     Patient awaiting bed availability at [facility] for Pleurx catheter.     Kevin Hong MD  Cardiology

## 2024-02-13 NOTE — PROGRESS NOTE ADULT - NS ATTEND AMEND GEN_ALL_CORE FT
2.5 x 1.4 cm right retroareolar breast nodule larger than 9/19/2023.   Appreciate surgery recommendations, o/p followup    Bloody plerual effusion, s/p thoracocentesis 2/8/24, 1L removed  Spoke with transfer center; accepted patient to [facility] pending bed availability  Patient with VHD; will stop eliquis and start heparin drip  To bridge with coumadin after [facility] evaluates for possible intervention re: effusion  C/w lasix 40mg IV daily as per cardiology    DVT PPX: ICD  AM labs.

## 2024-02-13 NOTE — PROGRESS NOTE ADULT - SUBJECTIVE AND OBJECTIVE BOX
Patient is a 89y old  Male who presents with a chief complaint of Shortness of breath (12 Feb 2024 14:09)      Patient seen and examined at bedside. No overnight events reported.     ALLERGIES:  No Known Allergies    MEDICATIONS  (STANDING):  allopurinol 100 milliGRAM(s) Oral daily  aspirin enteric coated 81 milliGRAM(s) Oral daily  atorvastatin 40 milliGRAM(s) Oral at bedtime  budesonide 160 MICROgram(s)/formoterol 4.5 MICROgram(s) Inhaler 2 Puff(s) Inhalation two times a day  furosemide   Injectable 40 milliGRAM(s) IV Push daily  heparin  Infusion.  Unit(s)/Hr (12 mL/Hr) IV Continuous <Continuous>  metoprolol succinate ER 50 milliGRAM(s) Oral daily  pantoprazole    Tablet 40 milliGRAM(s) Oral before breakfast  petrolatum white Ointment 1 Application(s) Topical three times a day  polyethylene glycol 3350 17 Gram(s) Oral two times a day  tiotropium 2.5 MICROgram(s) Inhaler 2 Puff(s) Inhalation daily    MEDICATIONS  (PRN):  acetaminophen     Tablet .. 650 milliGRAM(s) Oral every 6 hours PRN Mild Pain (1 - 3)  albuterol    90 MICROgram(s) HFA Inhaler 2 Puff(s) Inhalation every 6 hours PRN Shortness of Breath and/or Wheezing  aluminum hydroxide/magnesium hydroxide/simethicone Suspension 30 milliLiter(s) Oral every 4 hours PRN Dyspepsia  heparin   Injectable 2500 Unit(s) IV Push every 6 hours PRN For aPTT between 40 - 57  heparin   Injectable 5500 Unit(s) IV Push every 6 hours PRN For aPTT less than 40  melatonin 3 milliGRAM(s) Oral at bedtime PRN Insomnia  ondansetron Injectable 4 milliGRAM(s) IV Push every 8 hours PRN Nausea and/or Vomiting    Vital Signs Last 24 Hrs  T(F): 97.8 (13 Feb 2024 05:26), Max: 97.8 (12 Feb 2024 11:54)  HR: 73 (13 Feb 2024 05:26) (60 - 73)  BP: 120/62 (13 Feb 2024 05:26) (119/62 - 133/71)  RR: 18 (13 Feb 2024 05:26) (18 - 18)  SpO2: 93% (13 Feb 2024 05:26) (93% - 95%)  I&O's Summary    12 Feb 2024 07:01  -  13 Feb 2024 07:00  --------------------------------------------------------  IN: 820 mL / OUT: 300 mL / NET: 520 mL      PHYSICAL EXAM:  General: NAD, A/O x 3  ENT: No gross hearing impairment, Moist mucous membranes, no thrush  Neck: Supple, No JVD  Lungs: Clear to auscultation bilaterally, good air entry, non-labored breathing  Cardio: RRR, S1/S2, No murmur  Abdomen: Soft, Nontender, Nondistended; Bowel sounds present  Extremities: No calf tenderness, No cyanosis, No pitting edema  Psych: Appropriate mood and affect    LABS:                        11.2   8.72  )-----------( 175      ( 12 Feb 2024 06:58 )             34.3     02-13    140  |  98  |  58  ----------------------------<  139  4.1   |  33  |  1.60    Ca    8.7      13 Feb 2024 06:28  Mg     2.6     02-12            PTT - ( 13 Feb 2024 04:19 )  PTT:94.5 sec          02-07 Chol 103 mg/dL LDL -- HDL 48 mg/dL Trig 61 mg/dL                  Urinalysis Basic - ( 13 Feb 2024 06:28 )    Color: x / Appearance: x / SG: x / pH: x  Gluc: 139 mg/dL / Ketone: x  / Bili: x / Urobili: x   Blood: x / Protein: x / Nitrite: x   Leuk Esterase: x / RBC: x / WBC x   Sq Epi: x / Non Sq Epi: x / Bacteria: x        Culture - Body Fluid with Gram Stain (collected 08 Feb 2024 12:40)  Source: .Body Fluid Other, Pleural Fluid  Gram Stain (09 Feb 2024 00:58):    No polymorphonuclear leukocytes seen per low power field    No organisms seen per oil power field  Preliminary Report (09 Feb 2024 16:08):    No growth to date.    Culture - Fungal, Body Fluid (collected 08 Feb 2024 12:40)  Source: Pleural Fl Pleural Fluid  Preliminary Report (10 Feb 2024 15:04):    Culture is being performed. Fungal cultures are held for 4 weeks.        RADIOLOGY & ADDITIONAL TESTS:    Care Discussed with Consultants/Other Providers:    Patient is a 89y old  Male who presents with a chief complaint of Shortness of breath (12 Feb 2024 14:09)      Patient seen and examined at bedside. No overnight events reported.     ALLERGIES:  No Known Allergies    MEDICATIONS  (STANDING):  allopurinol 100 milliGRAM(s) Oral daily  aspirin enteric coated 81 milliGRAM(s) Oral daily  atorvastatin 40 milliGRAM(s) Oral at bedtime  budesonide 160 MICROgram(s)/formoterol 4.5 MICROgram(s) Inhaler 2 Puff(s) Inhalation two times a day  furosemide   Injectable 40 milliGRAM(s) IV Push daily  heparin  Infusion.  Unit(s)/Hr (12 mL/Hr) IV Continuous <Continuous>  metoprolol succinate ER 50 milliGRAM(s) Oral daily  pantoprazole    Tablet 40 milliGRAM(s) Oral before breakfast  petrolatum white Ointment 1 Application(s) Topical three times a day  polyethylene glycol 3350 17 Gram(s) Oral two times a day  tiotropium 2.5 MICROgram(s) Inhaler 2 Puff(s) Inhalation daily    MEDICATIONS  (PRN):  acetaminophen     Tablet .. 650 milliGRAM(s) Oral every 6 hours PRN Mild Pain (1 - 3)  albuterol    90 MICROgram(s) HFA Inhaler 2 Puff(s) Inhalation every 6 hours PRN Shortness of Breath and/or Wheezing  aluminum hydroxide/magnesium hydroxide/simethicone Suspension 30 milliLiter(s) Oral every 4 hours PRN Dyspepsia  heparin   Injectable 2500 Unit(s) IV Push every 6 hours PRN For aPTT between 40 - 57  heparin   Injectable 5500 Unit(s) IV Push every 6 hours PRN For aPTT less than 40  melatonin 3 milliGRAM(s) Oral at bedtime PRN Insomnia  ondansetron Injectable 4 milliGRAM(s) IV Push every 8 hours PRN Nausea and/or Vomiting    Vital Signs Last 24 Hrs  T(F): 97.8 (13 Feb 2024 05:26), Max: 97.8 (12 Feb 2024 11:54)  HR: 73 (13 Feb 2024 05:26) (60 - 73)  BP: 120/62 (13 Feb 2024 05:26) (119/62 - 133/71)  RR: 18 (13 Feb 2024 05:26) (18 - 18)  SpO2: 93% (13 Feb 2024 05:26) (93% - 95%)  I&O's Summary    12 Feb 2024 07:01  -  13 Feb 2024 07:00  --------------------------------------------------------  IN: 820 mL / OUT: 300 mL / NET: 520 mL      PHYSICAL EXAM:  General: NAD, A/O x 3  ENT: No gross hearing impairment, Moist mucous membranes, no thrush  Neck: Supple, No JVD  Lungs:  non-labored breathing, decreased breath sounds b/l  Cardio: RRR, S1/S2, No murmur  Abdomen: Soft, Nontender, Nondistended; Bowel sounds present  Extremities: No calf tenderness, No cyanosis, 2+pitting edema, left lower ext skin blister dressed  Psych: Appropriate mood and affect    LABS:                        11.2   8.72  )-----------( 175      ( 12 Feb 2024 06:58 )             34.3     02-13    140  |  98  |  58  ----------------------------<  139  4.1   |  33  |  1.60    Ca    8.7      13 Feb 2024 06:28  Mg     2.6     02-12            PTT - ( 13 Feb 2024 04:19 )  PTT:94.5 sec          02-07 Chol 103 mg/dL LDL -- HDL 48 mg/dL Trig 61 mg/dL                  Urinalysis Basic - ( 13 Feb 2024 06:28 )    Color: x / Appearance: x / SG: x / pH: x  Gluc: 139 mg/dL / Ketone: x  / Bili: x / Urobili: x   Blood: x / Protein: x / Nitrite: x   Leuk Esterase: x / RBC: x / WBC x   Sq Epi: x / Non Sq Epi: x / Bacteria: x        Culture - Body Fluid with Gram Stain (collected 08 Feb 2024 12:40)  Source: .Body Fluid Other, Pleural Fluid  Gram Stain (09 Feb 2024 00:58):    No polymorphonuclear leukocytes seen per low power field    No organisms seen per oil power field  Preliminary Report (09 Feb 2024 16:08):    No growth to date.    Culture - Fungal, Body Fluid (collected 08 Feb 2024 12:40)  Source: Pleural Fl Pleural Fluid  Preliminary Report (10 Feb 2024 15:04):    Culture is being performed. Fungal cultures are held for 4 weeks.        RADIOLOGY & ADDITIONAL TESTS:    Care Discussed with Consultants/Other Providers:    Patient is a 89y old  Male who presents with a chief complaint of Shortness of breath (12 Feb 2024 14:09)      Patient seen and examined at bedside. No overnight events reported.     ALLERGIES:  No Known Allergies    MEDICATIONS  (STANDING):  allopurinol 100 milliGRAM(s) Oral daily  aspirin enteric coated 81 milliGRAM(s) Oral daily  atorvastatin 40 milliGRAM(s) Oral at bedtime  budesonide 160 MICROgram(s)/formoterol 4.5 MICROgram(s) Inhaler 2 Puff(s) Inhalation two times a day  furosemide   Injectable 40 milliGRAM(s) IV Push daily  heparin  Infusion.  Unit(s)/Hr (12 mL/Hr) IV Continuous <Continuous>  metoprolol succinate ER 50 milliGRAM(s) Oral daily  pantoprazole    Tablet 40 milliGRAM(s) Oral before breakfast  petrolatum white Ointment 1 Application(s) Topical three times a day  polyethylene glycol 3350 17 Gram(s) Oral two times a day  tiotropium 2.5 MICROgram(s) Inhaler 2 Puff(s) Inhalation daily    MEDICATIONS  (PRN):  acetaminophen     Tablet .. 650 milliGRAM(s) Oral every 6 hours PRN Mild Pain (1 - 3)  albuterol    90 MICROgram(s) HFA Inhaler 2 Puff(s) Inhalation every 6 hours PRN Shortness of Breath and/or Wheezing  aluminum hydroxide/magnesium hydroxide/simethicone Suspension 30 milliLiter(s) Oral every 4 hours PRN Dyspepsia  heparin   Injectable 2500 Unit(s) IV Push every 6 hours PRN For aPTT between 40 - 57  heparin   Injectable 5500 Unit(s) IV Push every 6 hours PRN For aPTT less than 40  melatonin 3 milliGRAM(s) Oral at bedtime PRN Insomnia  ondansetron Injectable 4 milliGRAM(s) IV Push every 8 hours PRN Nausea and/or Vomiting    Vital Signs Last 24 Hrs  T(F): 97.8 (13 Feb 2024 05:26), Max: 97.8 (12 Feb 2024 11:54)  HR: 73 (13 Feb 2024 05:26) (60 - 73)  BP: 120/62 (13 Feb 2024 05:26) (119/62 - 133/71)  RR: 18 (13 Feb 2024 05:26) (18 - 18)  SpO2: 93% (13 Feb 2024 05:26) (93% - 95%)  I&O's Summary    12 Feb 2024 07:01  -  13 Feb 2024 07:00  --------------------------------------------------------  IN: 820 mL / OUT: 300 mL / NET: 520 mL      PHYSICAL EXAM:  General: NAD, A/O x 3  ENT: No gross hearing impairment, Moist mucous membranes, no thrush  Neck: Supple, No JVD  Lungs:  non-labored breathing, decreased breath sounds b/l  Cardio: RRR, S1/S2, No murmur  Abdomen: Soft, Nontender, Nondistended; Bowel sounds present  Extremities: No calf tenderness, No cyanosis, 2+pitting edema, left lower ext skin blister dressed  Psych: Appropriate mood and affect    LABS:                        11.2   8.72  )-----------( 175      ( 12 Feb 2024 06:58 )             34.3     02-13    140  |  98  |  58  ----------------------------<  139  4.1   |  33  |  1.60    Ca    8.7      13 Feb 2024 06:28  Mg     2.6     02-12    PTT - ( 13 Feb 2024 04:19 )  PTT:94.5 sec  02-07 Chol 103 mg/dL LDL -- HDL 48 mg/dL Trig 61 mg/dL    Urinalysis Basic - ( 13 Feb 2024 06:28 )    Color: x / Appearance: x / SG: x / pH: x  Gluc: 139 mg/dL / Ketone: x  / Bili: x / Urobili: x   Blood: x / Protein: x / Nitrite: x   Leuk Esterase: x / RBC: x / WBC x   Sq Epi: x / Non Sq Epi: x / Bacteria: x    Culture - Body Fluid with Gram Stain (collected 08 Feb 2024 12:40)  Source: .Body Fluid Other, Pleural Fluid  Gram Stain (09 Feb 2024 00:58):    No polymorphonuclear leukocytes seen per low power field    No organisms seen per oil power field  Preliminary Report (09 Feb 2024 16:08):    No growth to date.    Culture - Fungal, Body Fluid (collected 08 Feb 2024 12:40)  Source: Pleural Fl Pleural Fluid  Preliminary Report (10 Feb 2024 15:04):    Culture is being performed. Fungal cultures are held for 4 weeks.

## 2024-02-13 NOTE — PROGRESS NOTE ADULT - ASSESSMENT
89 year old male with PMH of afib on Eliquis, CHF (EF 45-50%, combined systolic and diastolic), CVA, HTN, COPD, CKD3, multiple hospitalizations for CHF exacerbation in the prior months. Patient also recently had a left sided pleural effusion that was drained last hospitalization.  CXR on this admission w/ large effusion. Patient to be admitted for CHF exacerbation.     #Acute on chronic hypoxic respiratory failure 2/2 bloody large left pleural effusion  - pt with multiple admissions recently and with drainage of pleural effusions  - s/p Thoracentesis on 2/8, 1 L drained, bloody per Dr. Hernandez  - pending tx to [facility] for Pleurx cath placement  - Echo 2/7: EF 55-60%, severely enlarged right/left atriums, pulm htn, mod mitral valve stenosis  - currently on RA   - CXR: Heart enlargement and quite large left effusion  - continue lasix 40 mg. IV daily  - Troponin neg, probnp 3572   - on fluid restriction 1L daily  - Cardiology following; cont current meds  - Farxiga held while on IV lasix  - Palliative consulted for GOC; pt is currently full code     #CT chest finding   #2.5 x 1.4 cm right retroareolar breast nodule larger than 9/19/2023   - Appreciate surgical team recommendations  - unable to obtain imaging at [facility], plan to follow up outpatient     #left lower ext blister   - continue local wound care   - wound care instructions from last hospitalization: Clean with NS (clean in circular motions with NS and gauze). Pat dry. Apply liquid barrier film to periwound skin, allow to dry. Place non adherent silicone layer dressing (Adaptic touch) over bases of wound, cover with Aquacel AG hydrofiber sheet, cover with ABD pad and kerlix, secure with paper tape. Change daily or PRN if soiled.    #Hx of CVA   #HTN  #CHF; chronic   #Afib; chronic   - no deficits noted   - continue statin, metoprolol, ASA  - cont heparin gtt prior to procedure    #COPD  - no home o2, but patient reports he uses oxygen cans OTC he buys from store   - continue albuterol prn  - continue spiriva, symbicort     #IRINEO 2/2 CKD3   - Cr. 1.7, around baseline   - Trend Cr.   - avoid nephrotoxins    #Hypernatremia; resolved  #Hypokalemia; 2/2 to Lasix  - K repleted   - cont to monitor bmp    #DVT ppx: heparin gtt     GOC - full code     Dispo: pending transfer to [facility]    Updated family at bedside  89 year old male with PMH of afib on Eliquis, CHF (EF 45-50%, combined systolic and diastolic), CVA, HTN, COPD, CKD3, multiple hospitalizations for CHF exacerbation in the prior months. Patient also recently had a left sided pleural effusion that was drained last hospitalization.  CXR on this admission w/ large effusion. Patient to be admitted for CHF exacerbation.     #Acute on chronic hypoxic respiratory failure 2/2 bloody large left pleural effusion  - pt with multiple admissions recently and with drainage of pleural effusions  - s/p Thoracentesis on 2/8, 1 L drained, bloody per Dr. Hernandez--PENDING TRANSFER TO [facility] FOR PLEURX CATH  - Echo 2/7: EF 55-60%, severely enlarged right/left atriums, pulm htn, mod mitral valve stenosis  - pt is off supplemental O2, currently on RA sats 93%  - continue lasix 40 mg. IV daily  - Troponin neg, probnp 3572   - on fluid restriction 1L daily  - Cardiology following; cont current meds  - Farxiga held while on IV lasix  - Palliative consulted for GOC; pt is currently full code     #CT chest finding   #2.5 x 1.4 cm right retroareolar breast nodule larger than 9/19/2023   - Appreciate surgical team recommendations  - unable to obtain imaging at [facility], plan to follow up outpatient     #left lower ext blister   - continue local wound care   - wound care instructions from last hospitalization: Clean with NS (clean in circular motions with NS and gauze). Pat dry. Apply liquid barrier film to periwound skin, allow to dry. Place non adherent silicone layer dressing (Adaptic touch) over bases of wound, cover with Aquacel AG hydrofiber sheet, cover with ABD pad and kerlix, secure with paper tape. Change daily or PRN if soiled.    #Hx of CVA   #HTN  #CHF; chronic   #Afib; chronic   - no deficits noted   - continue statin, metoprolol, ASA  - cont heparin gtt prior to procedure    #COPD  - no home o2, but patient reports he uses oxygen cans OTC he buys from store   - continue albuterol prn  - continue spiriva, symbicort     #IRINEO 2/2 CKD3   - Cr. 1.7, around baseline   - Trend Cr.   - avoid nephrotoxins    #Hypernatremia; resolved  #Hypokalemia; 2/2 to Lasix  - K repleted   - cont to monitor bmp    #DVT ppx: heparin gtt     GOC - full code     Dispo: pending transfer to [facility], pt states he will update his Daughter on plan.     Updated family at bedside

## 2024-02-13 NOTE — PROGRESS NOTE ADULT - SUBJECTIVE AND OBJECTIVE BOX
RAD SINGLETON  [id number]      Chief Complaint: Acute on chronic diastolic heart failure exacerbation/Moderate mitral stenosis     Interval History: The patient reports that breathing is stable, urinating well, denies chest discomfort.     Tele: atrial fibrillation 60s BPM      Current meds:   acetaminophen     Tablet .. 650 milliGRAM(s) Oral every 6 hours PRN  albuterol    90 MICROgram(s) HFA Inhaler 2 Puff(s) Inhalation every 6 hours PRN  allopurinol 100 milliGRAM(s) Oral daily  aluminum hydroxide/magnesium hydroxide/simethicone Suspension 30 milliLiter(s) Oral every 4 hours PRN  aspirin enteric coated 81 milliGRAM(s) Oral daily  atorvastatin 40 milliGRAM(s) Oral at bedtime  budesonide 160 MICROgram(s)/formoterol 4.5 MICROgram(s) Inhaler 2 Puff(s) Inhalation two times a day  furosemide   Injectable 40 milliGRAM(s) IV Push daily  heparin   Injectable 2500 Unit(s) IV Push every 6 hours PRN  heparin   Injectable 5500 Unit(s) IV Push every 6 hours PRN  heparin  Infusion.  Unit(s)/Hr IV Continuous <Continuous>  melatonin 3 milliGRAM(s) Oral at bedtime PRN  metoprolol succinate ER 50 milliGRAM(s) Oral daily  ondansetron Injectable 4 milliGRAM(s) IV Push every 8 hours PRN  pantoprazole    Tablet 40 milliGRAM(s) Oral before breakfast  petrolatum white Ointment 1 Application(s) Topical three times a day  polyethylene glycol 3350 17 Gram(s) Oral two times a day  tiotropium 2.5 MICROgram(s) Inhaler 2 Puff(s) Inhalation daily      Objective:    Vital Signs:   T(C): 36.6 (02-13-24 @ 05:26), Max: 36.6 (02-12-24 @ 11:54)  HR: 73 (02-13-24 @ 05:26) (60 - 73)  BP: 120/62 (02-13-24 @ 05:26) (119/62 - 133/71)  RR: 18 (02-13-24 @ 05:26) (18 - 18)  SpO2: 93% (02-13-24 @ 05:26) (93% - 95%)  Wt(kg): --      Physical Exam:   General: no distress  Neck: supple   CVS: JVP ~ 10 cm H20, irregularly irregular, s1, s2  Pulm: decreased breath sounds  Ext: mild lower extremity edema b/l   Neuro: awake, alert  Psych: Normal affect      Labs:   13 Feb 2024 06:28    140    |  98     |  58     ----------------------------<  139    4.1     |  33     |  1.60     Ca    8.7        13 Feb 2024 06:28  Mg     2.6       12 Feb 2024 06:58                            11.2   8.72  )-----------( 175      ( 12 Feb 2024 06:58 )             34.3     PTT - ( 13 Feb 2024 04:19 )  PTT:94.5 sec          ECG (2/7/24): atrial fibrillation    TTE (2/7/24):   1. Normal global left ventricular systolic function.   2. Left ventricular ejection fraction, by visual estimation, is 55 to 60%.   3. Calculated LVEF by Simpsons Biplane Method 57%.   4. Normal left ventricular internal cavity size.   5. There is mild septal left ventricular hypertrophy.   6. Severely enlarged left atrium.   7. Severely enlarged right atrium.   8. Rheumatic mitral valve.   9. Mild thickening and calcification of the anterior and posterior   mitral valve leaflets.  10. Moderate mitral valve stenosis (peak velocity 1.9 m/s, mean gradient 8 mmHg at HR 53 BPM, MVA 1.0 cm^2).  11. Mild mitral valve regurgitation.  12. Mild-moderate tricuspid regurgitation.  13. Aortic sclerosis with borderline decreased opening.  14. Mild aortic regurgitation.  15. Estimated pulmonary artery systolic pressure is 53.9 mmHg assuming a right atrial pressure of 15 mmHg, which is consistent with moderate pulmonary hypertension.  16. Large pleural effusion in the left lateral region.

## 2024-02-13 NOTE — PROGRESS NOTE ADULT - SUBJECTIVE AND OBJECTIVE BOX
Follow-up Pulmonary Progress Note  Chief Complaint : Heart failure      patient seen and examined  comfortable  no complaints  on room air      Allergies :No Known Allergies      PAST MEDICAL & SURGICAL HISTORY:  Afib    Congestive heart failure (CHF)    CVA (cerebral vascular accident)    Hypertension, unspecified type    Chronic obstructive pulmonary disease (COPD)    No significant past surgical history        Medications:  MEDICATIONS  (STANDING):  allopurinol 100 milliGRAM(s) Oral daily  aspirin enteric coated 81 milliGRAM(s) Oral daily  atorvastatin 40 milliGRAM(s) Oral at bedtime  budesonide 160 MICROgram(s)/formoterol 4.5 MICROgram(s) Inhaler 2 Puff(s) Inhalation two times a day  furosemide   Injectable 40 milliGRAM(s) IV Push daily  heparin  Infusion.  Unit(s)/Hr (12 mL/Hr) IV Continuous <Continuous>  metoprolol succinate ER 50 milliGRAM(s) Oral daily  pantoprazole    Tablet 40 milliGRAM(s) Oral before breakfast  petrolatum white Ointment 1 Application(s) Topical three times a day  polyethylene glycol 3350 17 Gram(s) Oral two times a day  tiotropium 2.5 MICROgram(s) Inhaler 2 Puff(s) Inhalation daily    MEDICATIONS  (PRN):  acetaminophen     Tablet .. 650 milliGRAM(s) Oral every 6 hours PRN Mild Pain (1 - 3)  albuterol    90 MICROgram(s) HFA Inhaler 2 Puff(s) Inhalation every 6 hours PRN Shortness of Breath and/or Wheezing  aluminum hydroxide/magnesium hydroxide/simethicone Suspension 30 milliLiter(s) Oral every 4 hours PRN Dyspepsia  heparin   Injectable 2500 Unit(s) IV Push every 6 hours PRN For aPTT between 40 - 57  heparin   Injectable 5500 Unit(s) IV Push every 6 hours PRN For aPTT less than 40  melatonin 3 milliGRAM(s) Oral at bedtime PRN Insomnia  ondansetron Injectable 4 milliGRAM(s) IV Push every 8 hours PRN Nausea and/or Vomiting      Antibiotics History      Heme Medications   aspirin enteric coated 81 milliGRAM(s) Oral daily, 02-06-24 @ 18:13  heparin   Injectable 2500 Unit(s) IV Push every 6 hours, 02-11-24 @ 18:00 PRN  heparin   Injectable 5500 Unit(s) IV Push every 6 hours, 02-11-24 @ 18:00 PRN  heparin  Infusion.  Unit(s)/Hr IV Continuous <Continuous>, 02-11-24 @ 18:00      GI Medications  aluminum hydroxide/magnesium hydroxide/simethicone Suspension 30 milliLiter(s) Oral every 4 hours, 02-06-24 @ 18:44, Routine PRN  pantoprazole    Tablet 40 milliGRAM(s) Oral before breakfast, 02-06-24 @ 18:17, Routine  polyethylene glycol 3350 17 Gram(s) Oral two times a day, 02-06-24 @ 18:16, Routine        LABS:                        11.2   8.72  )-----------( 175      ( 12 Feb 2024 06:58 )             34.3     02-13    140  |  98  |  58<H>  ----------------------------<  139<H>  4.1   |  33<H>  |  1.60<H>    Ca    8.7      13 Feb 2024 06:28  Mg     2.6     02-12      Fluid characteristics  -- 02-08 @ 12:40  pH 7.4    tprot 5.0    Cell count  Appearance Bloody  Fluid type --  BF lymph 57  color Red  eosinophil 1  PMN --  Mesothelial 8  Monocyte 29  Other body cells --          PTT - ( 13 Feb 2024 12:15 )  PTT:101.5 sec  Urinalysis Basic - ( 13 Feb 2024 06:28 )    Color: x / Appearance: x / SG: x / pH: x  Gluc: 139 mg/dL / Ketone: x  / Bili: x / Urobili: x   Blood: x / Protein: x / Nitrite: x   Leuk Esterase: x / RBC: x / WBC x   Sq Epi: x / Non Sq Epi: x / Bacteria: x              CULTURES: (if applicable)    Culture - Body Fluid with Gram Stain (collected 02-08-24 @ 12:40)  Source: .Body Fluid Other, Pleural Fluid  Gram Stain (02-09-24 @ 00:58):    No polymorphonuclear leukocytes seen per low power field    No organisms seen per oil power field  Final Report (02-13-24 @ 15:57):    No growth at 5 days    Culture - Fungal, Body Fluid (collected 02-08-24 @ 12:40)  Source: Pleural Fl Pleural Fluid  Preliminary Report (02-10-24 @ 15:04):    Culture is being performed. Fungal cultures are held for 4 weeks.      Rapid RVP Result: NotDetec (02-06-24 @ 18:22)       VITALS:  T(C): 36.7 (02-13-24 @ 12:27), Max: 36.7 (02-13-24 @ 12:27)  T(F): 98.1 (02-13-24 @ 12:27), Max: 98.1 (02-13-24 @ 12:27)  HR: 64 (02-13-24 @ 12:27) (64 - 73)  BP: 122/51 (02-13-24 @ 12:27) (120/62 - 133/71)  BP(mean): --  ABP: --  ABP(mean): --  RR: 18 (02-13-24 @ 12:27) (18 - 18)  SpO2: 95% (02-13-24 @ 12:27) (93% - 95%)  CVP(mm Hg): --  CVP(cm H2O): --    Ins and Outs     02-12-24 @ 07:01  -  02-13-24 @ 07:00  --------------------------------------------------------  IN: 820 mL / OUT: 300 mL / NET: 520 mL    02-13-24 @ 07:01  -  02-13-24 @ 19:09  --------------------------------------------------------  IN: 480 mL / OUT: 0 mL / NET: 480 mL                I&O's Detail    12 Feb 2024 07:01  -  13 Feb 2024 07:00  --------------------------------------------------------  IN:    Oral Fluid: 820 mL  Total IN: 820 mL    OUT:    Voided (mL): 300 mL  Total OUT: 300 mL    Total NET: 520 mL      13 Feb 2024 07:01  -  13 Feb 2024 19:09  --------------------------------------------------------  IN:    Oral Fluid: 480 mL  Total IN: 480 mL    OUT:  Total OUT: 0 mL    Total NET: 480 mL

## 2024-02-13 NOTE — PROGRESS NOTE ADULT - ASSESSMENT
Physical Examination:  GENERAL:               Alert, Oriented, No acute distress.    HEENT:                   No JVD, Moist MM  PULM:                     Bilateral air entry, diminished to ausciltion on left , no significant sputum production, No Rales, No Rhonchi, No Wheezing  CVS:                         S1, S2,  +murmur  ABD:                        Soft, nondistended, nontender, normoactive bowel sounds,   EXT:                         +edema, nontender, No Cyanosis or Clubbing   NEURO:                  Alert, oriented, interactive, nonfocal, follows commands  PSYC:                      Calm, + Insight.         Assessment  Dyspnea suspect due to acute on chronic Effusion Left  underlying Diastolic CHF possible  Chronic Left pleural effusions  s/p thoracentesis 11/24/23, 2/8/24- Exudate   Abnormal CT 2.5 x 1.4 cm right retroareolar breast nodule larger than 9/19/2023. Stable prominent mediastinal lymph nodes. Redemonstrated asymmetrical gynecomastia.      Afib on a/c  Cigar smoker, at risk for copd.   Worsening renal function on CKD    Plan  pleural effusion recurrent, Exudative and serosanguinous with previously negative cytology  f/u repeat cyto results    will need out patient f/u for workup of  breast nodule  will need Pleurex and pleural biopsy, can even consider ebus if can evaluate mediastinal lymph nodes     a/c transitioned to heparin drip for possible proceudre  will need to transfer for pleurex/pleural biopsy +/- EBUS, vs out patient f/u with thoracic, but unsure if patient will follow or become symptomatic in near future  awaiting bed in [facility]      Case d/w Dr. Hull - awaiting bed in [facility]

## 2024-02-14 NOTE — PROGRESS NOTE ADULT - ASSESSMENT
89 year old male with PMH of afib on Eliquis, CHF (EF 45-50%, combined systolic and diastolic), CVA, HTN, COPD, CKD3, multiple hospitalizations for CHF exacerbation in the prior months. Patient also recently had a left sided pleural effusion that was drained last hospitalization.  CXR on this admission w/ large effusion. Patient to be admitted for CHF exacerbation.     #Acute on chronic hypoxic respiratory failure 2/2 bloody large left pleural effusion  - pt with multiple admissions recently and with drainage of pleural effusions  - s/p Thoracentesis on 2/8, 1 L drained, bloody per Dr. Hernandez--PENDING TRANSFER TO [facility] FOR PLEURX CATH  - Echo 2/7: EF 55-60%, severely enlarged right/left atriums, pulm htn, mod mitral valve stenosis  - pt is off supplemental O2, currently on RA sats 93%  - continue lasix 40 mg. IV daily  - Troponin neg, probnp 3572   - on fluid restriction 1L daily  - Cardiology following; cont current meds  - Farxiga held while on IV lasix  - Palliative consulted for GOC; pt is currently full code     #CT chest finding   #2.5 x 1.4 cm right retroareolar breast nodule larger than 9/19/2023   - Appreciate surgical team recommendations  - unable to obtain imaging at [facility], plan to follow up outpatient     #left lower ext blister   - continue local wound care   - wound care instructions from last hospitalization: Clean with NS (clean in circular motions with NS and gauze). Pat dry. Apply liquid barrier film to periwound skin, allow to dry. Place non adherent silicone layer dressing (Adaptic touch) over bases of wound, cover with Aquacel AG hydrofiber sheet, cover with ABD pad and kerlix, secure with paper tape. Change daily or PRN if soiled.    #Hx of CVA   #HTN  #CHF; chronic   #Afib; chronic   - no deficits noted   - continue statin, metoprolol, ASA  - cont heparin gtt prior to procedure    #COPD  - no home o2, but patient reports he uses oxygen cans OTC he buys from store   - continue albuterol prn  - continue spiriva, symbicort     #IRINEO 2/2 CKD3   - Cr. 1.7, around baseline   - Trend Cr.   - avoid nephrotoxins    #Hypernatremia; resolved  #Hypokalemia; 2/2 to Lasix  - K repleted   - cont to monitor bmp    #DVT ppx: heparin gtt     GOC - full code     Dispo: pending transfer to [facility], pt states he will update his Daughter on plan.     Updated family at bedside

## 2024-02-14 NOTE — PROGRESS NOTE ADULT - SUBJECTIVE AND OBJECTIVE BOX
RAD SINGLETON  [id number]        Chief Complaint: Acute on chronic diastolic heart failure exacerbation/Moderate mitral stenosis     Interval History: The patient reports that breathing is stable, urinating well, denies chest discomfort.     Tele: atrial fibrillation 60s BPM      Current meds:   acetaminophen     Tablet .. 650 milliGRAM(s) Oral every 6 hours PRN  albuterol    90 MICROgram(s) HFA Inhaler 2 Puff(s) Inhalation every 6 hours PRN  allopurinol 100 milliGRAM(s) Oral daily  aluminum hydroxide/magnesium hydroxide/simethicone Suspension 30 milliLiter(s) Oral every 4 hours PRN  aspirin enteric coated 81 milliGRAM(s) Oral daily  atorvastatin 40 milliGRAM(s) Oral at bedtime  budesonide 160 MICROgram(s)/formoterol 4.5 MICROgram(s) Inhaler 2 Puff(s) Inhalation two times a day  furosemide   Injectable 40 milliGRAM(s) IV Push daily  heparin   Injectable 2500 Unit(s) IV Push every 6 hours PRN  heparin   Injectable 5500 Unit(s) IV Push every 6 hours PRN  heparin  Infusion.  Unit(s)/Hr IV Continuous <Continuous>  melatonin 3 milliGRAM(s) Oral at bedtime PRN  metoprolol succinate ER 50 milliGRAM(s) Oral daily  ondansetron Injectable 4 milliGRAM(s) IV Push every 8 hours PRN  pantoprazole    Tablet 40 milliGRAM(s) Oral before breakfast  petrolatum white Ointment 1 Application(s) Topical three times a day  polyethylene glycol 3350 17 Gram(s) Oral two times a day  tiotropium 2.5 MICROgram(s) Inhaler 2 Puff(s) Inhalation daily      Objective:     Vital Signs:   T(C): 36.2 (02-14-24 @ 04:52), Max: 36.7 (02-13-24 @ 12:27)  HR: 79 (02-14-24 @ 04:52) (64 - 79)  BP: 145/65 (02-14-24 @ 04:52) (121/52 - 145/65)  RR: 18 (02-14-24 @ 04:52) (18 - 18)  SpO2: 96% (02-14-24 @ 04:52) (95% - 96%)  Wt(kg): --      Physical Exam:   General: no distress  Neck: supple   CVS: JVP ~ 10 cm H20, irregularly irregular, s1, s2  Pulm: decreased breath sounds  Ext: mild lower extremity edema b/l   Neuro: awake, alert  Psych: Normal affect      Labs:   13 Feb 2024 06:28    140    |  98     |  58     ----------------------------<  139    4.1     |  33     |  1.60     Ca    8.7        13 Feb 2024 06:28        PTT - ( 14 Feb 2024 02:56 )  PTT:77.4 sec      ECG (2/7/24): atrial fibrillation    TTE (2/7/24):   1. Normal global left ventricular systolic function.   2. Left ventricular ejection fraction, by visual estimation, is 55 to 60%.   3. Calculated LVEF by Simpsons Biplane Method 57%.   4. Normal left ventricular internal cavity size.   5. There is mild septal left ventricular hypertrophy.   6. Severely enlarged left atrium.   7. Severely enlarged right atrium.   8. Rheumatic mitral valve.   9. Mild thickening and calcification of the anterior and posterior   mitral valve leaflets.  10. Moderate mitral valve stenosis (peak velocity 1.9 m/s, mean gradient 8 mmHg at HR 53 BPM, MVA 1.0 cm^2).  11. Mild mitral valve regurgitation.  12. Mild-moderate tricuspid regurgitation.  13. Aortic sclerosis with borderline decreased opening.  14. Mild aortic regurgitation.  15. Estimated pulmonary artery systolic pressure is 53.9 mmHg assuming a right atrial pressure of 15 mmHg, which is consistent with moderate pulmonary hypertension.  16. Large pleural effusion in the left lateral region.

## 2024-02-14 NOTE — PROGRESS NOTE ADULT - ASSESSMENT
Physical Examination:  GENERAL:               Alert, Oriented, No acute distress.    HEENT:                   No JVD, Moist MM  PULM:                     Bilateral air entry, diminished to ausciltion on left , no significant sputum production, No Rales, No Rhonchi, No Wheezing  CVS:                         S1, S2,  +murmur  ABD:                        Soft, nondistended, nontender, normoactive bowel sounds,   EXT:                         +edema, nontender, No Cyanosis or Clubbing   NEURO:                  Alert, oriented, interactive, nonfocal, follows commands  PSYC:                      Calm, + Insight.         Assessment  Dyspnea suspect due to acute on chronic Effusion Left  underlying Diastolic CHF possible  Chronic Left pleural effusions  s/p thoracentesis 11/24/23, 2/8/24- Exudate   Abnormal CT 2.5 x 1.4 cm right retroareolar breast nodule larger than 9/19/2023. Stable prominent mediastinal lymph nodes. Redemonstrated asymmetrical gynecomastia.      Afib on a/c  Cigar smoker, at risk for copd.   Worsening renal function on CKD    Plan  pleural effusion recurrent, Exudative and serosanguinous with Repeat cytology negative for malignant    will need out patient f/u for workup of  breast nodule  will need Pleurex and pleural biopsy, can even consider ebus if can evaluate mediastinal lymph nodes     a/c transitioned to heparin drip for possible proceudre  will need to transfer for pleurex/pleural biopsy +/- EBUS, vs out patient f/u with thoracic, but unsure if patient will follow or become symptomatic in near future  awaiting bed in [facility]      Case d/w Dr. Hull - awaiting bed in [facility]

## 2024-02-14 NOTE — PROGRESS NOTE ADULT - SUBJECTIVE AND OBJECTIVE BOX
Patient is a 89y old  Male who presents with a chief complaint of Shortness of breath (14 Feb 2024 10:08)    Reports feeling well  Denies chest pain, SOB    Patient seen and examined at bedside.    ALLERGIES:  No Known Allergies    MEDICATIONS  (STANDING):  allopurinol 100 milliGRAM(s) Oral daily  aspirin enteric coated 81 milliGRAM(s) Oral daily  atorvastatin 40 milliGRAM(s) Oral at bedtime  budesonide 160 MICROgram(s)/formoterol 4.5 MICROgram(s) Inhaler 2 Puff(s) Inhalation two times a day  furosemide   Injectable 40 milliGRAM(s) IV Push daily  heparin  Infusion.  Unit(s)/Hr (12 mL/Hr) IV Continuous <Continuous>  metoprolol succinate ER 50 milliGRAM(s) Oral daily  pantoprazole    Tablet 40 milliGRAM(s) Oral before breakfast  petrolatum white Ointment 1 Application(s) Topical three times a day  polyethylene glycol 3350 17 Gram(s) Oral two times a day  tiotropium 2.5 MICROgram(s) Inhaler 2 Puff(s) Inhalation daily    MEDICATIONS  (PRN):  acetaminophen     Tablet .. 650 milliGRAM(s) Oral every 6 hours PRN Mild Pain (1 - 3)  albuterol    90 MICROgram(s) HFA Inhaler 2 Puff(s) Inhalation every 6 hours PRN Shortness of Breath and/or Wheezing  aluminum hydroxide/magnesium hydroxide/simethicone Suspension 30 milliLiter(s) Oral every 4 hours PRN Dyspepsia  heparin   Injectable 5500 Unit(s) IV Push every 6 hours PRN For aPTT less than 40  heparin   Injectable 2500 Unit(s) IV Push every 6 hours PRN For aPTT between 40 - 57  melatonin 3 milliGRAM(s) Oral at bedtime PRN Insomnia  ondansetron Injectable 4 milliGRAM(s) IV Push every 8 hours PRN Nausea and/or Vomiting    Vital Signs Last 24 Hrs  T(F): 97.2 (14 Feb 2024 04:52), Max: 98.1 (13 Feb 2024 12:27)  HR: 79 (14 Feb 2024 04:52) (64 - 79)  BP: 145/65 (14 Feb 2024 04:52) (121/52 - 145/65)  RR: 18 (14 Feb 2024 04:52) (18 - 18)  SpO2: 96% (14 Feb 2024 04:52) (95% - 96%)  I&O's Summary    13 Feb 2024 07:01  -  14 Feb 2024 07:00  --------------------------------------------------------  IN: 480 mL / OUT: 0 mL / NET: 480 mL    14 Feb 2024 07:01  -  14 Feb 2024 10:26  --------------------------------------------------------  IN: 240 mL / OUT: 300 mL / NET: -60 mL    PHYSICAL EXAM:  General: NAD, A/O x 3  ENT: No gross hearing impairment, Moist mucous membranes, no thrush  Neck: Supple, No JVD  Lungs:  non-labored breathing, decreased breath sounds b/l  Cardio: RRR, S1/S2, No murmur  Abdomen: Soft, Nontender, Nondistended; Bowel sounds present  Extremities: No calf tenderness, No cyanosis, 2+pitting edema, left lower ext skin blister dressed  Psych: Appropriate mood and affect      LABS:                        11.2   8.72  )-----------( 175      ( 12 Feb 2024 06:58 )             34.3       02-13    140  |  98  |  58  ----------------------------<  139  4.1   |  33  |  1.60    Ca    8.7      13 Feb 2024 06:28  Mg     2.6     02-12    PTT - ( 14 Feb 2024 02:56 )  PTT:77.4 sec     02-07 Chol 103 mg/dL LDL -- HDL 48 mg/dL Trig 61 mg/dL    Urinalysis Basic - ( 13 Feb 2024 06:28 )    Color: x / Appearance: x / SG: x / pH: x  Gluc: 139 mg/dL / Ketone: x  / Bili: x / Urobili: x   Blood: x / Protein: x / Nitrite: x   Leuk Esterase: x / RBC: x / WBC x   Sq Epi: x / Non Sq Epi: x / Bacteria: x    Culture - Body Fluid with Gram Stain (collected 08 Feb 2024 12:40)  Source: .Body Fluid Other, Pleural Fluid  Gram Stain (09 Feb 2024 00:58):    No polymorphonuclear leukocytes seen per low power field    No organisms seen per oil power field  Final Report (13 Feb 2024 15:57):    No growth at 5 days    Culture - Fungal, Body Fluid (collected 08 Feb 2024 12:40)  Source: Pleural Fl Pleural Fluid  Preliminary Report (10 Feb 2024 15:04):    Culture is being performed. Fungal cultures are held for 4 weeks.    COVID-19 PCR: NotDetec (01-02-23 @ 10:55)  COVID-19 PCR: NotDetec (01-02-23 @ 06:00)

## 2024-02-14 NOTE — PROGRESS NOTE ADULT - SUBJECTIVE AND OBJECTIVE BOX
Follow-up Pulmonary Progress Note  Chief Complaint : Heart failure          No new events overnight.  Denies SOB/CP.       Allergies :No Known Allergies      PAST MEDICAL & SURGICAL HISTORY:  Afib    Congestive heart failure (CHF)    CVA (cerebral vascular accident)    Hypertension, unspecified type    Chronic obstructive pulmonary disease (COPD)    No significant past surgical history        Medications:  MEDICATIONS  (STANDING):  allopurinol 100 milliGRAM(s) Oral daily  aspirin enteric coated 81 milliGRAM(s) Oral daily  atorvastatin 40 milliGRAM(s) Oral at bedtime  budesonide 160 MICROgram(s)/formoterol 4.5 MICROgram(s) Inhaler 2 Puff(s) Inhalation two times a day  furosemide   Injectable 40 milliGRAM(s) IV Push daily  heparin  Infusion.  Unit(s)/Hr (12 mL/Hr) IV Continuous <Continuous>  metoprolol succinate ER 50 milliGRAM(s) Oral daily  pantoprazole    Tablet 40 milliGRAM(s) Oral before breakfast  petrolatum white Ointment 1 Application(s) Topical three times a day  polyethylene glycol 3350 17 Gram(s) Oral two times a day  tiotropium 2.5 MICROgram(s) Inhaler 2 Puff(s) Inhalation daily    MEDICATIONS  (PRN):  acetaminophen     Tablet .. 650 milliGRAM(s) Oral every 6 hours PRN Mild Pain (1 - 3)  albuterol    90 MICROgram(s) HFA Inhaler 2 Puff(s) Inhalation every 6 hours PRN Shortness of Breath and/or Wheezing  aluminum hydroxide/magnesium hydroxide/simethicone Suspension 30 milliLiter(s) Oral every 4 hours PRN Dyspepsia  heparin   Injectable 2500 Unit(s) IV Push every 6 hours PRN For aPTT between 40 - 57  heparin   Injectable 5500 Unit(s) IV Push every 6 hours PRN For aPTT less than 40  melatonin 3 milliGRAM(s) Oral at bedtime PRN Insomnia  ondansetron Injectable 4 milliGRAM(s) IV Push every 8 hours PRN Nausea and/or Vomiting      Antibiotics History      Heme Medications   aspirin enteric coated 81 milliGRAM(s) Oral daily, 02-06-24 @ 18:13  heparin   Injectable 5500 Unit(s) IV Push every 6 hours, 02-11-24 @ 18:00 PRN  heparin   Injectable 2500 Unit(s) IV Push every 6 hours, 02-11-24 @ 18:00 PRN  heparin  Infusion.  Unit(s)/Hr IV Continuous <Continuous>, 02-11-24 @ 18:00      GI Medications  aluminum hydroxide/magnesium hydroxide/simethicone Suspension 30 milliLiter(s) Oral every 4 hours, 02-06-24 @ 18:44, Routine PRN  pantoprazole    Tablet 40 milliGRAM(s) Oral before breakfast, 02-06-24 @ 18:17, Routine  polyethylene glycol 3350 17 Gram(s) Oral two times a day, 02-06-24 @ 18:16, Routine        LABS:    02-13    140  |  98  |  58<H>  ----------------------------<  139<H>  4.1   |  33<H>  |  1.60<H>    Ca    8.7      13 Feb 2024 06:28      Fluid characteristics  -- 02-08 @ 12:40  pH 7.4    tprot 5.0    Cell count  Appearance Bloody  Fluid type --  BF lymph 57  color Red  eosinophil 1  PMN --  Mesothelial 8  Monocyte 29  Other body cells --          PTT - ( 14 Feb 2024 02:56 )  PTT:77.4 sec  Urinalysis Basic - ( 13 Feb 2024 06:28 )    Color: x / Appearance: x / SG: x / pH: x  Gluc: 139 mg/dL / Ketone: x  / Bili: x / Urobili: x   Blood: x / Protein: x / Nitrite: x   Leuk Esterase: x / RBC: x / WBC x   Sq Epi: x / Non Sq Epi: x / Bacteria: x         VITALS:  T(C): 36.8 (02-14-24 @ 12:26), Max: 36.8 (02-14-24 @ 12:26)  T(F): 98.2 (02-14-24 @ 12:26), Max: 98.2 (02-14-24 @ 12:26)  HR: 66 (02-14-24 @ 12:26) (66 - 79)  BP: 143/78 (02-14-24 @ 12:26) (121/52 - 145/65)  BP(mean): 92 (02-14-24 @ 04:52) (92 - 92)  ABP: --  ABP(mean): --  RR: 18 (02-14-24 @ 12:26) (18 - 18)  SpO2: 95% (02-14-24 @ 12:26) (95% - 96%)  CVP(mm Hg): --  CVP(cm H2O): --    Ins and Outs     02-13-24 @ 07:01  -  02-14-24 @ 07:00  --------------------------------------------------------  IN: 480 mL / OUT: 0 mL / NET: 480 mL    02-14-24 @ 07:01  -  02-14-24 @ 18:06  --------------------------------------------------------  IN: 480 mL / OUT: 500 mL / NET: -20 mL                I&O's Detail    13 Feb 2024 07:01  -  14 Feb 2024 07:00  --------------------------------------------------------  IN:    Oral Fluid: 480 mL  Total IN: 480 mL    OUT:  Total OUT: 0 mL    Total NET: 480 mL      14 Feb 2024 07:01  -  14 Feb 2024 18:06  --------------------------------------------------------  IN:    Oral Fluid: 480 mL  Total IN: 480 mL    OUT:    Voided (mL): 500 mL  Total OUT: 500 mL    Total NET: -20 mL

## 2024-02-15 NOTE — PROGRESS NOTE ADULT - NS ATTEND OPT1 GEN_ALL_CORE
I attest my time as attending is greater than 50% of the total combined time spent on qualifying patient care activities by the PA/NP and attending.
I independently performed the documented:
I independently performed the documented:
I attest my time as attending is greater than 50% of the total combined time spent on qualifying patient care activities by the PA/NP and attending.

## 2024-02-15 NOTE — PROGRESS NOTE ADULT - NS ATTEND AMEND GEN_ALL_CORE FT
CBC/BMP stable. Spoke with pulm- will need Pleurex and pleural biopsy, can even consider ebus if can evaluate mediastinal lymph nodes. C/w Lasix IV and Heparin drip per cardio recs. Pending transfer to [facility]

## 2024-02-15 NOTE — PROGRESS NOTE ADULT - ASSESSMENT
Physical Examination:  GENERAL:               Alert, Oriented, No acute distress.    HEENT:                   No JVD, Moist MM  PULM:                     Bilateral air entry, diminished to ausciltion on left , no significant sputum production, No Rales, No Rhonchi, No Wheezing  CVS:                         S1, S2,  +murmur  ABD:                        Soft, nondistended, nontender, normoactive bowel sounds,   EXT:                         +edema, nontender, No Cyanosis or Clubbing   NEURO:                  Alert, oriented, interactive, nonfocal, follows commands  PSYC:                      Calm, + Insight.         Assessment  Dyspnea suspect due to acute on chronic Effusion Left  underlying Diastolic CHF possible  Chronic Left pleural effusions  s/p thoracentesis 11/24/23, 2/8/24- Exudate   Abnormal CT 2.5 x 1.4 cm right retroareolar breast nodule larger than 9/19/2023. Stable prominent mediastinal lymph nodes. Redemonstrated asymmetrical gynecomastia.      Afib on a/c  Cigar smoker, at risk for copd.   Worsening renal function on CKD    Plan  pleural effusion recurrent, Exudative and serosanguinous with Repeat cytology negative for malignant cells   will need Pleurex and pleural biopsy, can even consider ebus if can evaluate mediastinal lymph nodes  will need out patient f/u for workup of  breast nodule   a/c transitioned to heparin drip for possible proceudre in [facility]  Currently awaiting bed in [facility]      Case d/w Dr. Hull - awaiting bed in [facility]

## 2024-02-15 NOTE — PROGRESS NOTE ADULT - NUTRITIONAL ASSESSMENT
This patient has been assessed with a concern for Malnutrition and has been determined to have a diagnosis/diagnoses of Moderate protein-calorie malnutrition.    This patient is being managed with:   Diet Renal Restrictions-  For patients receiving Renal Replacement - No Protein Restr No Conc K No Conc Phos Low Sodium  Supplement Feeding Modality:  Oral  Nepro Cans or Servings Per Day:  1       Frequency:  Daily  Entered: Feb 7 2024 10:56AM  

## 2024-02-15 NOTE — PROGRESS NOTE ADULT - SUBJECTIVE AND OBJECTIVE BOX
Patient is a 89y old  Male who presents with a chief complaint of Shortness of breath (15 Feb 2024 07:55)    Patient seen and examined at bedside. No overnight events reported. Patient is frustrated at wait for transfer     ALLERGIES:  No Known Allergies    MEDICATIONS  (STANDING):  allopurinol 100 milliGRAM(s) Oral daily  aspirin enteric coated 81 milliGRAM(s) Oral daily  atorvastatin 40 milliGRAM(s) Oral at bedtime  budesonide 160 MICROgram(s)/formoterol 4.5 MICROgram(s) Inhaler 2 Puff(s) Inhalation two times a day  furosemide   Injectable 40 milliGRAM(s) IV Push daily  heparin  Infusion.  Unit(s)/Hr (12 mL/Hr) IV Continuous <Continuous>  metoprolol succinate ER 50 milliGRAM(s) Oral daily  pantoprazole    Tablet 40 milliGRAM(s) Oral before breakfast  petrolatum white Ointment 1 Application(s) Topical three times a day  polyethylene glycol 3350 17 Gram(s) Oral two times a day  tiotropium 2.5 MICROgram(s) Inhaler 2 Puff(s) Inhalation daily    MEDICATIONS  (PRN):  acetaminophen     Tablet .. 650 milliGRAM(s) Oral every 6 hours PRN Mild Pain (1 - 3)  albuterol    90 MICROgram(s) HFA Inhaler 2 Puff(s) Inhalation every 6 hours PRN Shortness of Breath and/or Wheezing  aluminum hydroxide/magnesium hydroxide/simethicone Suspension 30 milliLiter(s) Oral every 4 hours PRN Dyspepsia  heparin   Injectable 2500 Unit(s) IV Push every 6 hours PRN For aPTT between 40 - 57  heparin   Injectable 5500 Unit(s) IV Push every 6 hours PRN For aPTT less than 40  melatonin 3 milliGRAM(s) Oral at bedtime PRN Insomnia  ondansetron Injectable 4 milliGRAM(s) IV Push every 8 hours PRN Nausea and/or Vomiting    Vital Signs Last 24 Hrs  T(F): 98 (15 Feb 2024 05:02), Max: 98.2 (14 Feb 2024 12:26)  HR: 86 (15 Feb 2024 05:02) (66 - 86)  BP: 149/62 (15 Feb 2024 05:02) (138/68 - 149/62)  RR: 17 (15 Feb 2024 05:02) (17 - 18)  SpO2: 98% (15 Feb 2024 05:02) (95% - 98%)    I&O's Summary  14 Feb 2024 07:01  -  15 Feb 2024 07:00  --------------------------------------------------------  IN: 680 mL / OUT: 500 mL / NET: 180 mL    PHYSICAL EXAM:  General: NAD, A/O x 3  ENT: No gross hearing impairment, Moist mucous membranes, no thrush  Neck: Supple, No JVD  Lungs:  non-labored breathing, decreased breath sounds b/l  Cardio: RRR, S1/S2, No murmur  Abdomen: Soft, Nontender, Nondistended; Bowel sounds present  Extremities: No calf tenderness, No cyanosis, 2+pitting edema, left lower ext skin blister dressed  Psych: Appropriate mood and affect    LABS:                        10.7   9.14  )-----------( 180      ( 15 Feb 2024 08:50 )             33.4     02-13    140  |  98  |  58  ----------------------------<  139  4.1   |  33  |  1.60    Ca    8.7      13 Feb 2024 06:28    PTT - ( 15 Feb 2024 08:50 )  PTT:72.5 sec    02-07 Chol 103 mg/dL LDL -- HDL 48 mg/dL Trig 61 mg/dL    Urinalysis Basic - ( 13 Feb 2024 06:28 )    Color: x / Appearance: x / SG: x / pH: x  Gluc: 139 mg/dL / Ketone: x  / Bili: x / Urobili: x   Blood: x / Protein: x / Nitrite: x   Leuk Esterase: x / RBC: x / WBC x   Sq Epi: x / Non Sq Epi: x / Bacteria: x    Culture - Body Fluid with Gram Stain (collected 08 Feb 2024 12:40)  Source: .Body Fluid Other, Pleural Fluid  Gram Stain (09 Feb 2024 00:58):    No polymorphonuclear leukocytes seen per low power field    No organisms seen per oil power field  Final Report (13 Feb 2024 15:57):    No growth at 5 days    Culture - Fungal, Body Fluid (collected 08 Feb 2024 12:40)  Source: Pleural Fl Pleural Fluid  Preliminary Report (10 Feb 2024 15:04):    Culture is being performed. Fungal cultures are held for 4 weeks.        RADIOLOGY & ADDITIONAL TESTS:    Care Discussed with Consultants/Other Providers:    Patient is a 89y old  Male who presents with a chief complaint of Shortness of breath (15 Feb 2024 07:55)    Patient seen and examined at bedside. No overnight events reported. Patient is frustrated at wait for transfer     ALLERGIES:  No Known Allergies    MEDICATIONS  (STANDING):  allopurinol 100 milliGRAM(s) Oral daily  aspirin enteric coated 81 milliGRAM(s) Oral daily  atorvastatin 40 milliGRAM(s) Oral at bedtime  budesonide 160 MICROgram(s)/formoterol 4.5 MICROgram(s) Inhaler 2 Puff(s) Inhalation two times a day  furosemide   Injectable 40 milliGRAM(s) IV Push daily  heparin  Infusion.  Unit(s)/Hr (12 mL/Hr) IV Continuous <Continuous>  metoprolol succinate ER 50 milliGRAM(s) Oral daily  pantoprazole    Tablet 40 milliGRAM(s) Oral before breakfast  petrolatum white Ointment 1 Application(s) Topical three times a day  polyethylene glycol 3350 17 Gram(s) Oral two times a day  tiotropium 2.5 MICROgram(s) Inhaler 2 Puff(s) Inhalation daily    MEDICATIONS  (PRN):  acetaminophen     Tablet .. 650 milliGRAM(s) Oral every 6 hours PRN Mild Pain (1 - 3)  albuterol    90 MICROgram(s) HFA Inhaler 2 Puff(s) Inhalation every 6 hours PRN Shortness of Breath and/or Wheezing  aluminum hydroxide/magnesium hydroxide/simethicone Suspension 30 milliLiter(s) Oral every 4 hours PRN Dyspepsia  heparin   Injectable 2500 Unit(s) IV Push every 6 hours PRN For aPTT between 40 - 57  heparin   Injectable 5500 Unit(s) IV Push every 6 hours PRN For aPTT less than 40  melatonin 3 milliGRAM(s) Oral at bedtime PRN Insomnia  ondansetron Injectable 4 milliGRAM(s) IV Push every 8 hours PRN Nausea and/or Vomiting    Vital Signs Last 24 Hrs  T(F): 98 (15 Feb 2024 05:02), Max: 98.2 (14 Feb 2024 12:26)  HR: 86 (15 Feb 2024 05:02) (66 - 86)  BP: 149/62 (15 Feb 2024 05:02) (138/68 - 149/62)  RR: 17 (15 Feb 2024 05:02) (17 - 18)  SpO2: 98% (15 Feb 2024 05:02) (95% - 98%)    I&O's Summary  14 Feb 2024 07:01  -  15 Feb 2024 07:00  --------------------------------------------------------  IN: 680 mL / OUT: 500 mL / NET: 180 mL    PHYSICAL EXAM:  General: NAD, A/O x 3  ENT: No gross hearing impairment, Moist mucous membranes, no thrush  Neck: Supple, No JVD  Lungs:  non-labored breathing, decreased breath sounds b/l  Cardio: RRR, S1/S2, No murmur  Abdomen: Soft, Nontender, Nondistended; Bowel sounds present  Extremities: No calf tenderness, No cyanosis, 2+pitting edema, left lower ext skin blister dressed  Psych: Appropriate mood and affect    LABS:                        10.7   9.14  )-----------( 180      ( 15 Feb 2024 08:50 )             33.4     02-13    140  |  98  |  58  ----------------------------<  139  4.1   |  33  |  1.60    Ca    8.7      13 Feb 2024 06:28    PTT - ( 15 Feb 2024 08:50 )  PTT:72.5 sec    02-07 Chol 103 mg/dL LDL -- HDL 48 mg/dL Trig 61 mg/dL    Urinalysis Basic - ( 13 Feb 2024 06:28 )    Color: x / Appearance: x / SG: x / pH: x  Gluc: 139 mg/dL / Ketone: x  / Bili: x / Urobili: x   Blood: x / Protein: x / Nitrite: x   Leuk Esterase: x / RBC: x / WBC x   Sq Epi: x / Non Sq Epi: x / Bacteria: x    Culture - Body Fluid with Gram Stain (collected 08 Feb 2024 12:40)  Source: .Body Fluid Other, Pleural Fluid  Gram Stain (09 Feb 2024 00:58):    No polymorphonuclear leukocytes seen per low power field    No organisms seen per oil power field  Final Report (13 Feb 2024 15:57):    No growth at 5 days    Culture - Fungal, Body Fluid (collected 08 Feb 2024 12:40)  Source: Pleural Fl Pleural Fluid  Preliminary Report (10 Feb 2024 15:04):    Culture is being performed. Fungal cultures are held for 4 weeks.        RADIOLOGY & ADDITIONAL TESTS:    Care Discussed with Consultants/Other Providers: yes with pulm and cardio

## 2024-02-15 NOTE — PROGRESS NOTE ADULT - ASSESSMENT
Assessment:  89 year old man with past medical history of Atrial fibrillation (on Eliquis), HFrecEF (LVEF 25-30% in 2019, now 55-60% in 2024), Moderate mitral valve stenosis, Hypertension, Chronic kidney disease, COPD and history of CVA with recurrent hospitalizations for congestive heart failure likely from medication noncompliance and recent hospitalization at [facility] last month for congestive heart failure exacerbation deemed not to require chest tube placement at the time, also with moderate to severe PAD - deemed to be poor surgical candidate for lower extremity vascular intervention, now presents with progressive dyspnea, found to have acute on chronic diastolic heart failure exacerbation with large left pleural effusion.    ECG consisent with atrial fibrillation. Troponins negative x 2, does not appear to be an acute coronary syndrome. 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 The patient is now s/p thoracentesis with 1 L fluid removed.    Recommendations:  [] Acute on chronic diastolic heart failure exacerbation with chronic pleural effusions: S/p thoracentesis, patient awaiting bed availability at [facility] for Pleurx catheter. Continue IV diuresis. Continue Metoprolol succinate 50 mg daily. Plan to resume SGLT2-I when euvolemic. Follow up fluid studies and Pulmonary.  [] Moderate mitral valve stenosis: The patient would appear to be a poor candidate for MV PMBC or surgical repair at this age and also due to comorbidities, can obtain structural heart team evaluation while at [facility], likely outpatient management.   [] Atrial fibrillation: Rate controlled, continue beta blocker. Given moderate mitral stenosis the patient would likely benefit from coumadin instead, Patient currently on heparin drip     Patient awaiting bed availability at [facility] for Pleurx catheter.

## 2024-02-15 NOTE — PROGRESS NOTE ADULT - SUBJECTIVE AND OBJECTIVE BOX
RAD SINGLETON  [id number]        Chief Complaint: Acute on chronic diastolic heart failure exacerbation/Moderate mitral stenosis     Interval History: The patient reports that breathing is stable, urinating well, denies chest discomfort.     Tele: atrial fibrillation 60-70s BPM      Current meds:   acetaminophen     Tablet .. 650 milliGRAM(s) Oral every 6 hours PRN  albuterol    90 MICROgram(s) HFA Inhaler 2 Puff(s) Inhalation every 6 hours PRN  allopurinol 100 milliGRAM(s) Oral daily  aluminum hydroxide/magnesium hydroxide/simethicone Suspension 30 milliLiter(s) Oral every 4 hours PRN  aspirin enteric coated 81 milliGRAM(s) Oral daily  atorvastatin 40 milliGRAM(s) Oral at bedtime  budesonide 160 MICROgram(s)/formoterol 4.5 MICROgram(s) Inhaler 2 Puff(s) Inhalation two times a day  furosemide   Injectable 40 milliGRAM(s) IV Push daily  heparin   Injectable 2500 Unit(s) IV Push every 6 hours PRN  heparin   Injectable 5500 Unit(s) IV Push every 6 hours PRN  heparin  Infusion.  Unit(s)/Hr IV Continuous <Continuous>  melatonin 3 milliGRAM(s) Oral at bedtime PRN  metoprolol succinate ER 50 milliGRAM(s) Oral daily  ondansetron Injectable 4 milliGRAM(s) IV Push every 8 hours PRN  pantoprazole    Tablet 40 milliGRAM(s) Oral before breakfast  petrolatum white Ointment 1 Application(s) Topical three times a day  polyethylene glycol 3350 17 Gram(s) Oral two times a day  tiotropium 2.5 MICROgram(s) Inhaler 2 Puff(s) Inhalation daily      Objective:     Vital Signs:   T(C): 36.2 (02-14-24 @ 04:52), Max: 36.7 (02-13-24 @ 12:27)  HR: 79 (02-14-24 @ 04:52) (64 - 79)  BP: 145/65 (02-14-24 @ 04:52) (121/52 - 145/65)  RR: 18 (02-14-24 @ 04:52) (18 - 18)  SpO2: 96% (02-14-24 @ 04:52) (95% - 96%)  Wt(kg): --      Physical Exam:   General: no distress  Neck: supple   CVS: JVP ~ 10 cm H20, irregularly irregular, s1, s2  Pulm: decreased breath sounds  Ext: mild lower extremity edema b/l   Neuro: awake, alert  Psych: Normal affect      Labs:   13 Feb 2024 06:28    140    |  98     |  58     ----------------------------<  139    4.1     |  33     |  1.60     Ca    8.7        13 Feb 2024 06:28        PTT - ( 14 Feb 2024 02:56 )  PTT:77.4 sec      ECG (2/7/24): atrial fibrillation    TTE (2/7/24):   1. Normal global left ventricular systolic function.   2. Left ventricular ejection fraction, by visual estimation, is 55 to 60%.   3. Calculated LVEF by Simpsons Biplane Method 57%.   4. Normal left ventricular internal cavity size.   5. There is mild septal left ventricular hypertrophy.   6. Severely enlarged left atrium.   7. Severely enlarged right atrium.   8. Rheumatic mitral valve.   9. Mild thickening and calcification of the anterior and posterior   mitral valve leaflets.  10. Moderate mitral valve stenosis (peak velocity 1.9 m/s, mean gradient 8 mmHg at HR 53 BPM, MVA 1.0 cm^2).  11. Mild mitral valve regurgitation.  12. Mild-moderate tricuspid regurgitation.  13. Aortic sclerosis with borderline decreased opening.  14. Mild aortic regurgitation.  15. Estimated pulmonary artery systolic pressure is 53.9 mmHg assuming a right atrial pressure of 15 mmHg, which is consistent with moderate pulmonary hypertension.  16. Large pleural effusion in the left lateral region.

## 2024-02-15 NOTE — PROGRESS NOTE ADULT - PROVIDER SPECIALTY LIST ADULT
Cardiology
Hospitalist
Pulmonology
Cardiology
Cardiology
Hospitalist
Pulmonology
Cardiology
Hospitalist
Pulmonology
Pulmonology
Cardiology
Palliative Care
Pulmonology
Hospitalist
Hospitalist

## 2024-02-15 NOTE — PROGRESS NOTE ADULT - SUBJECTIVE AND OBJECTIVE BOX
Follow-up Pulmonary Progress Note  Chief Complaint : Heart failure    patient seen and examined  comfortbale  no cp, sob, palp, n/v  wants to go home.       Allergies :No Known Allergies      PAST MEDICAL & SURGICAL HISTORY:  Afib    Congestive heart failure (CHF)    CVA (cerebral vascular accident)    Hypertension, unspecified type    Chronic obstructive pulmonary disease (COPD)    No significant past surgical history        Medications:  MEDICATIONS  (STANDING):  allopurinol 100 milliGRAM(s) Oral daily  aspirin enteric coated 81 milliGRAM(s) Oral daily  atorvastatin 40 milliGRAM(s) Oral at bedtime  budesonide 160 MICROgram(s)/formoterol 4.5 MICROgram(s) Inhaler 2 Puff(s) Inhalation two times a day  furosemide   Injectable 40 milliGRAM(s) IV Push daily  heparin  Infusion.  Unit(s)/Hr (12 mL/Hr) IV Continuous <Continuous>  metoprolol succinate ER 50 milliGRAM(s) Oral daily  pantoprazole    Tablet 40 milliGRAM(s) Oral before breakfast  petrolatum white Ointment 1 Application(s) Topical three times a day  polyethylene glycol 3350 17 Gram(s) Oral two times a day  tiotropium 2.5 MICROgram(s) Inhaler 2 Puff(s) Inhalation daily    MEDICATIONS  (PRN):  acetaminophen     Tablet .. 650 milliGRAM(s) Oral every 6 hours PRN Mild Pain (1 - 3)  albuterol    90 MICROgram(s) HFA Inhaler 2 Puff(s) Inhalation every 6 hours PRN Shortness of Breath and/or Wheezing  aluminum hydroxide/magnesium hydroxide/simethicone Suspension 30 milliLiter(s) Oral every 4 hours PRN Dyspepsia  heparin   Injectable 2500 Unit(s) IV Push every 6 hours PRN For aPTT between 40 - 57  heparin   Injectable 5500 Unit(s) IV Push every 6 hours PRN For aPTT less than 40  melatonin 3 milliGRAM(s) Oral at bedtime PRN Insomnia  ondansetron Injectable 4 milliGRAM(s) IV Push every 8 hours PRN Nausea and/or Vomiting      Antibiotics History      Heme Medications   aspirin enteric coated 81 milliGRAM(s) Oral daily, 02-06-24 @ 18:13  heparin   Injectable 2500 Unit(s) IV Push every 6 hours, 02-11-24 @ 18:00 PRN  heparin   Injectable 5500 Unit(s) IV Push every 6 hours, 02-11-24 @ 18:00 PRN  heparin  Infusion.  Unit(s)/Hr IV Continuous <Continuous>, 02-11-24 @ 18:00      GI Medications  aluminum hydroxide/magnesium hydroxide/simethicone Suspension 30 milliLiter(s) Oral every 4 hours, 02-06-24 @ 18:44, Routine PRN  pantoprazole    Tablet 40 milliGRAM(s) Oral before breakfast, 02-06-24 @ 18:17, Routine  polyethylene glycol 3350 17 Gram(s) Oral two times a day, 02-06-24 @ 18:16, Routine        LABS:                        10.7   9.14  )-----------( 180      ( 15 Feb 2024 08:50 )             33.4     02-15    141  |  100  |  49<H>  ----------------------------<  105<H>  4.2   |  37<H>  |  1.52<H>    Ca    9.1      15 Feb 2024 08:50  Mg     2.7     02-15      Fluid characteristics  -- 02-08 @ 12:40  pH 7.4    tprot 5.0    Cell count  Appearance Bloody  Fluid type --  BF lymph 57  color Red  eosinophil 1  PMN --  Mesothelial 8  Monocyte 29  Other body cells --          PTT - ( 15 Feb 2024 08:50 )  PTT:72.5 sec  Urinalysis Basic - ( 15 Feb 2024 08:50 )    Color: x / Appearance: x / SG: x / pH: x  Gluc: 105 mg/dL / Ketone: x  / Bili: x / Urobili: x   Blood: x / Protein: x / Nitrite: x   Leuk Esterase: x / RBC: x / WBC x   Sq Epi: x / Non Sq Epi: x / Bacteria: x              CULTURES: (if applicable)    Culture - Body Fluid with Gram Stain (collected 02-08-24 @ 12:40)  Source: .Body Fluid Other, Pleural Fluid  Gram Stain (02-09-24 @ 00:58):    No polymorphonuclear leukocytes seen per low power field    No organisms seen per oil power field  Final Report (02-13-24 @ 15:57):    No growth at 5 days    Culture - Fungal, Body Fluid (collected 02-08-24 @ 12:40)  Source: Pleural Fl Pleural Fluid  Preliminary Report (02-10-24 @ 15:04):    Culture is being performed. Fungal cultures are held for 4 weeks.      Rapid RVP Result: NotDetec (02-06-24 @ 18:22)         VITALS:  T(C): 36.7 (02-15-24 @ 12:27), Max: 36.7 (02-15-24 @ 05:02)  T(F): 98 (02-15-24 @ 12:27), Max: 98 (02-15-24 @ 05:02)  HR: 74 (02-15-24 @ 12:27) (74 - 86)  BP: 134/69 (02-15-24 @ 12:27) (134/69 - 149/62)  BP(mean): --  ABP: --  ABP(mean): --  RR: 17 (02-15-24 @ 12:27) (17 - 18)  SpO2: 96% (02-15-24 @ 12:27) (96% - 98%)  CVP(mm Hg): --  CVP(cm H2O): --    Ins and Outs     02-14-24 @ 07:01  -  02-15-24 @ 07:00  --------------------------------------------------------  IN: 680 mL / OUT: 500 mL / NET: 180 mL    02-15-24 @ 07:01  -  02-15-24 @ 13:41  --------------------------------------------------------  IN: 396 mL / OUT: 300 mL / NET: 96 mL                I&O's Detail    14 Feb 2024 07:01  -  15 Feb 2024 07:00  --------------------------------------------------------  IN:    Oral Fluid: 680 mL  Total IN: 680 mL    OUT:    Voided (mL): 500 mL  Total OUT: 500 mL    Total NET: 180 mL      15 Feb 2024 07:01  -  15 Feb 2024 13:41  --------------------------------------------------------  IN:    Heparin Infusion: 36 mL    Oral Fluid: 360 mL  Total IN: 396 mL    OUT:    Voided (mL): 300 mL  Total OUT: 300 mL    Total NET: 96 mL

## 2024-02-15 NOTE — PROGRESS NOTE ADULT - NS ATTEND AMEND GEN_ALL_CORE FT
-  89 year old man admitted with Progressive dyspnea and found to have acute on chronic diastolic heart failure with large left pleural effusion.  Cardiac issues include persistent atrial fibrillation, anticoagulated, peripheral arterial disease.  Status post thoracentesis with removal of 1 L fluid, improvement in symptoms.  Patient currently awaiting bed at [facility] for CT surgery evaluation, Pleurx catheter.    Recommendations  -Continue metoprolol succinate.  -Continue IV Lasix.  -Currently on IV heparin in anticipation of Pleurx catheter.  Transition to oral post procedure.  -Discussed with patient and with primary team.

## 2024-02-15 NOTE — PROGRESS NOTE ADULT - ASSESSMENT
89 year old male with PMH of afib on Eliquis, CHF (EF 45-50%, combined systolic and diastolic), CVA, HTN, COPD, CKD3, multiple hospitalizations for CHF exacerbation in the prior months. Patient also recently had a left sided pleural effusion that was drained last hospitalization.  CXR on this admission w/ large effusion. Patient to be admitted for CHF exacerbation.     #Acute on chronic hypoxic respiratory failure 2/2 bloody large left pleural effusion  - Pt with multiple admissions recently and with drainage of pleural effusions  - s/p Thoracentesis on 2/8, 1 L drained, bloody per Dr. Hernandez   - Echo 2/7: EF 55-60%, severely enlarged right/left atriums, pulm htn, mod mitral valve stenosis  - Pt is off supplemental O2, currently on room air   - Continue lasix 40 mg. IV daily  - On heparin infusion for A Fib, upcoming procedure  - Monitor H&H  - On fluid restriction 1L daily  - Farxiga held while on IV lasix  - Cardiology following; cont current meds  - Palliative consulted for GOC; pt is currently full code   - PENDING TRANSFER TO [facility] FOR PLEURX CATH    #CT chest finding: 2.5 x 1.4 cm right retroareolar breast nodule larger than 9/19/2023   - Appreciate surgical team recommendations  - Will need out patient f/u for workup of  breast nodule  - Will need Pleurex and pleural biopsy as per pulm     #Left lower ext blister   - Continue local wound care     #Hx of CVA   #HTN  #CHF; chronic   #Afib; chronic   - No deficits noted   - Continue statin, metoprolol, ASA  - Cont heparin gtt prior to procedure    #COPD  - No home o2, but patient reports he uses oxygen cans OTC he buys from store   - Continue albuterol prn  - Continue spiriva, symbicort     #IRINEO 2/2 CKD3   - Cr near baseline   - Trend Cr.   - Avoid nephrotoxins    #Hypernatremia; resolved  #Hypokalemia; 2/2 to Lasix  - K repleted   - Cont to monitor bmp    #DVT ppx: heparin gtt     GOC - full code     Dispo: Pending transfer to [facility]       89 year old male with PMH of afib on Eliquis, CHF (EF 45-50%, combined systolic and diastolic), CVA, HTN, COPD, CKD3, multiple hospitalizations for CHF exacerbation in the prior months. Patient also recently had a left sided pleural effusion that was drained last hospitalization.  CXR on this admission w/ large effusion. Patient to be admitted for CHF exacerbation.     #Acute on chronic hypoxic respiratory failure 2/2 bloody large left pleural effusion  - Pt with multiple admissions recently and with drainage of pleural effusions  - s/p Thoracentesis on 2/8, 1 L drained, bloody per Dr. Hernandez   - Echo 2/7: EF 55-60%, severely enlarged right/left atriums, pulm htn, mod mitral valve stenosis  - Pt is off supplemental O2, currently on room air   - Continue lasix 40 mg. IV daily  - On heparin infusion for A Fib, upcoming procedure  - Monitor H&H  - On fluid restriction 1L daily  - Farxiga held while on IV lasix  - Cardiology following; cont current meds  - Palliative consulted for GOC; pt is currently full code   - PENDING TRANSFER TO [facility] FOR PLEURX CATH    #CT chest finding: 2.5 x 1.4 cm right retroareolar breast nodule larger than 9/19/2023   - Appreciate surgical team recommendations  - Will need out patient f/u for workup of  breast nodule  - Will need Pleurex and pleural biopsy as per pulm     #Left lower ext blister   - Continue local wound care     #Hx of CVA   #HTN  #CHF; chronic   #Afib; chronic   - No deficits noted   - Continue statin, metoprolol, ASA  - Cont heparin gtt prior to procedure    #COPD  - No home o2, but patient reports he uses oxygen cans OTC he buys from store   - Continue albuterol prn  - Continue spiriva, symbicort     #IRINEO 2/2 CKD3   - Cr near baseline   - Trend Cr.   - Avoid nephrotoxins    #Hypernatremia; resolved  #Hypokalemia; 2/2 to Lasix  - K repleted   - Cont to monitor bmp    #DVT ppx: heparin gtt     GOC - full code     Dispo: Pending transfer to [facility]. Patient declines offer to call his family to provide update, states he will update his family

## 2024-02-16 NOTE — H&P ADULT - NSHPREVIEWOFSYSTEMS_GEN_ALL_CORE
REVIEW OF SYSTEMS:    CONSTITUTIONAL: No weakness, fevers or chills  EYES/ENT: No visual changes;  No vertigo or throat pain   NECK: No pain or stiffness  RESPIRATORY: + productive cough, + shortness of breath  CARDIOVASCULAR: No chest pain or palpitations  GASTROINTESTINAL: No abdominal or epigastric pain. No nausea, vomiting, or hematemesis; No diarrhea or constipation. No melena or hematochezia.  GENITOURINARY: Decreased urination   NEUROLOGICAL: No numbness or weakness  SKIN: No itching, rashes

## 2024-02-16 NOTE — H&P ADULT - PROBLEM SELECTOR PLAN 7
Patient's Cr on admission appears to be similar to prior baseline Cr   Patient appears to have developed rising Cr within the past 3 years   - Continue to monitor

## 2024-02-16 NOTE — DISCHARGE NOTE NURSING/CASE MANAGEMENT/SOCIAL WORK - PATIENT PORTAL LINK FT
You can access the FollowMyHealth Patient Portal offered by [facility] by registering at the following website: [email or web address]. By joining [facility]’s FollowMyHealth portal, you will also be able to view your health information using other applications (apps) compatible with our system.

## 2024-02-16 NOTE — H&P ADULT - PROBLEM SELECTOR PLAN 1
Patient admitted for acute hypoxic respiratory failure 2/2 recurrence of large left pleural effusion   Patient also has CHF and COPD   Currently on 2L NC  CT chest from 2/8 shows "Lingular and left lower lobe groundglass infiltrates likely pneumonia. Small bilateral pleural effusions. Cardiomegaly. Stable prominent mediastinal lymph nodes."   - Wean O2 as tolerated

## 2024-02-16 NOTE — DIETITIAN NUTRITION RISK NOTIFICATION - UPON NUTRITIONAL ASSESSMENT BY THE REGISTERED DIETITIAN YOUR PATIENT WAS DETERMINED TO MEET CRITERIA/HAS EVIDENCE OF THE FOLLOWING DIAGNOSIS:
Orders Placed This Encounter    AMB SUPPLY ORDER     Diagnosis: Mixed Sleep Apnea G47.39    Positive Airway Pressure Therapy: Duration of need: 99 months. BiPAP autoSV  with Heated Humidifier :    Min EPAP:  04 cmH2O / Max EPAP: 10 cmH2O;  Min PS:       4 cmH2O / Max PS:     12 cmH2O;  Max Pressure:  22 cmH2O, Back up Rate: Auto;     Full Face Mask 1 every 3 months.  Full Face Mask Cushion 1 per month.  Headgear 1 every 6 months.  Tubing 1 every 3 months.  Filter(s) Non-Disposable 1 every 6 months. Evelyne Silvestre MD, FAASM; NPI: [id number]  Electronically signed.  05/21/19 Severe protein-calorie malnutrition

## 2024-02-16 NOTE — DIETITIAN INITIAL EVALUATION ADULT - ADD RECOMMEND
1. Continue present diet order as it remains appropriate at this time.   2. Nursing to document PO in RN flow sheets and monitor weekly weights.   3. Continue to monitor skin integrity, bowel regimen, and nutrition pertinent labs.

## 2024-02-16 NOTE — H&P ADULT - NSHPPHYSICALEXAM_GEN_ALL_CORE
VITALS:   T(C): 36.9 (02-16-24 @ 02:30), Max: 36.9 (02-16-24 @ 02:30)  HR: 70 (02-16-24 @ 02:30) (62 - 74)  BP: 143/74 (02-16-24 @ 02:30) (132/54 - 143/74)  RR: 18 (02-16-24 @ 02:30) (17 - 18)  SpO2: 100% (02-16-24 @ 02:30) (94% - 100%)    GENERAL: NAD, lying in bed comfortably  HEAD:  Atraumatic, normocephalic  EYES: EOMI, PERRLA, conjunctiva and sclera clear  ENT: Moist mucous membranes  NECK: Supple, no JVD  HEART: Irregular rate and rhythm, no murmurs, rubs, or gallops  LUNGS: Unlabored respirations.  Clear to auscultation bilaterally, no crackles, wheezing, or rhonchi  ABDOMEN: Soft, nontender, nondistended, +BS  EXTREMITIES: 2+ peripheral pulses bilaterally. + B/l LE edema  NERVOUS SYSTEM:  A&Ox3, no focal deficits   SKIN: No rashes or lesions

## 2024-02-16 NOTE — H&P ADULT - ATTENDING COMMENTS
Pt is a 89M PMHx  A. fib on Eliquis at home, CHF (EF 45-50%, combined systolic and diastolic), CVA, HTN, COPD, CKD3, multiple hospitalizations for CHF exacerbation in the prior months, left sided pleural effusion that was drained last hospitalization, admitted to [facility] on 2/6/2024 for CHF exacerbation and acute on chronic hypoxic respiratory failure 2/2 large left pleural effusion. Pt s/p thoracentesis w/ pulm 2/8 and transferred to [facility] for evaluation for pleurx catheter. Of note, pt recently transferred to [facility] last month for CT surgery eval for evaluation and recommended IR pigtail catheter at that time. IR evaluated the patient and determined that there was no indication for pigtail catheter given clinical improvement at that time.     #Left pleural effusion  - exudative, recurrent, s/p multiple thoracentesis  - previous cytology negative for malignant cells  - pulm consult in AM for possible pleurx    Agree with rest as above.

## 2024-02-16 NOTE — PATIENT PROFILE ADULT - FUNCTIONAL ASSESSMENT - BASIC MOBILITY SECTION LABEL
Pt began c/o chest . V/S 183/96, HR-102.  O2 applied at 2L/NC.   Dr. Haddad notified. Order obtained for EKG, CXR and troponins   .

## 2024-02-16 NOTE — PROGRESS NOTE ADULT - PROBLEM SELECTOR PLAN 8
DVT: Heparin gtt   Diet: DASH minced and moist diet pending dysphagia screen   Dispo: pending clinical progress, PT consulted DVT: Heparin gtt   Diet: DASH minced and moist diet pending dysphagia screen   Dispo: pending clinical progress, PT consulted    Spoke to patients daughter on phone on 2/16/24

## 2024-02-16 NOTE — PROGRESS NOTE ADULT - ASSESSMENT
89M PMHx A. fib on Eliquis, CHF (EF 45-50%, combined systolic and diastolic), CVA, HTN, COPD, CKD3, multiple hospitalizations for CHF exacerbation in the prior months, left sided pleural effusion that was drained last hospitalization, admitted to [facility] on 2/6/2024 for CHF exacerbation and acute on chronic hypoxic respiratory failure 2/2 large left pleural effusion. Patient transfered to [facility] for PleurX catheter and possible EUS for lymph node biopsy.

## 2024-02-16 NOTE — CONSULT NOTE ADULT - ASSESSMENT
Assessment: 89M PMHx A. susana previously on Eliquis, CHF (EF 45-50%, combined systolic and diastolic), CVA, HTN, COPD, CKD3, multiple hospitalizations for CHF exacerbation in the prior months, left sided pleural effusion that was drained last hospitalization, admitted to [facility] on 2/6/2024 for CHF exacerbation and acute on chronic hypoxic respiratory failure 2/2 large left pleural effusion. TTE on 2/7 showed EF 55-60%, pulmonary hypertension, moderate mitral stenosis. He was evaluated by Pulmonary and had Thoracentesis on 2/8, 1 L bloody output drained.  Pulmonology recommended transfer to [facility] for Pleurex catheter and possible EUS for lymph node biopsy.  Patient placed on IV lasix for diuresis. Seen by thoracic surgery team on 2/16.     Recs:  - please obtain CXR  - please obtain cardiology evaluation for perioperative risk assessment for potential pleurx, either awake VATS or with general anesthesia  - maintain hep gtt for AC pending OR planning  - Tentative OR next week for pleurx pending final evaluation by attending surgeon  - will follow, please call with any questions   - D/w Dr. Kayla Arroyo MD, PGY3  Thoracic Surgery   [id number]

## 2024-02-16 NOTE — DISCHARGE NOTE NURSING/CASE MANAGEMENT/SOCIAL WORK - NSDCPEFALRISK_GEN_ALL_CORE
For information on Fall & Injury Prevention, visit: [email or web address] OR  [email or web address] OR  https://www.cdc.gov/steadi/patient.html

## 2024-02-16 NOTE — DIETITIAN INITIAL EVALUATION ADULT - NS FNS DIET ORDER
Diet, DASH/TLC:   Sodium & Cholesterol Restricted  Minced and Moist (MINCEDMOIST) (02-16-24 @ 05:05)

## 2024-02-16 NOTE — PROGRESS NOTE ADULT - PROBLEM SELECTOR PLAN 2
s/p multiple thoracenteses with most recent thoracentesis resulting in removal of 1L of serosanguinous fluid   Cytopathology negative for malignant cells   Pleural fluid from 2/8 meets Light's criteria - exudative   CT chest from 2/8 shows "Lingular and left lower lobe groundglass infiltrates likely pneumonia. Small bilateral pleural effusions. Cardiomegaly. Stable prominent mediastinal lymph nodes."   Pleural fluid Cx unremarkable   As per pulm- consult thoracic surgery for pleurex

## 2024-02-16 NOTE — CONSULT NOTE ADULT - SUBJECTIVE AND OBJECTIVE BOX
THORACIC SURGERY CONSULT NOTE  --------------------------------------------------------------------------------------------    Patient is a 89y old  Male who presents with a chief complaint of Transfer for PleurX (16 Feb 2024 05:06)      HPI:   89M PMHx A. fib previously on Eliquis, CHF (EF 45-50%, combined systolic and diastolic), CVA, HTN, COPD, CKD3, multiple hospitalizations for CHF exacerbation in the prior months, left sided pleural effusion that was drained last hospitalization, admitted to [facility] on 2/6/2024 for CHF exacerbation and acute on chronic hypoxic respiratory failure 2/2 large left pleural effusion. TTE on 2/7 showed EF 55-60%, pulmonary hypertension, moderate mitral stenosis. He was evaluated by Pulmonary and had Thoracentesis on 2/8, 1 L bloody output drained.  Pulmonology recommended transfer to [facility] for Pleurex catheter and possible EUS for lymph node biopsy.  Patient placed on IV lasix for diuresis.  Pt was also found with 2.5 x 1.4 cm right retroareolar breast nodule larger than 9/19/2023.   Surgery was consulted and recommended outpatient follow-up. Patient also found with LLE blister, which is chronic.  Pt also with IRINEO 2/2 CKD3 on this admission, with Hypernatremia and Hypokalemia, K was repleted. Patient transitioned from Eliquis to heparin gtt during admission due to valvular heart disease. Patient was also evaluated by cardiology for moderate mitral stenosis, CHF, and a. fib, was diuresed with IV Lasix, treated with metoprolol, and recommended for structural heart evaluation knowing that patient is a poor candidate for procedural intervention due to comorbidities. Patient was then transferred to [facility].     Of note, patient was recently admitted in 1/2024 presenting with concern for LE blisters, found to have acute on chronic CHF, large exudative pleural effusion s/p 1L removal, transferred from  for thoracic surgery evaluation and was found to be a poor surgical candidate. Now transferred back to [facility], remarks he is a former smoker (cigars quit 4y ago) no difficulty breathing, no personal or family hx of lung cancer, no hemoptysis, feels well but is occasionally symptomatic from pleural effusions when they recur, would be interested in a pleurx if indicated and makes his own medical decisions. Denies any prior chest or abdominal surgical history.       ROS: 10-system review is otherwise negative except HPI above.      PAST MEDICAL & SURGICAL HISTORY:  Afib      Congestive heart failure (CHF)      CVA (cerebral vascular accident)      Hypertension, unspecified type      Chronic obstructive pulmonary disease (COPD)      No significant past surgical history        FAMILY HISTORY:      SOCIAL HISTORY:      ALLERGIES: No Known Allergies      HOME MEDICATIONS:     CURRENT MEDICATIONS  MEDICATIONS (STANDING): allopurinol 100 milliGRAM(s) Oral daily  aspirin enteric coated 81 milliGRAM(s) Oral daily  atorvastatin 40 milliGRAM(s) Oral at bedtime  budesonide 160 MICROgram(s)/formoterol 4.5 MICROgram(s) Inhaler 2 Puff(s) Inhalation two times a day  furosemide   Injectable 40 milliGRAM(s) IV Push daily  heparin  Infusion. 900 Unit(s)/Hr IV Continuous <Continuous>  metoprolol succinate ER 50 milliGRAM(s) Oral daily  pantoprazole    Tablet 40 milliGRAM(s) Oral before breakfast  polyethylene glycol 3350 17 Gram(s) Oral two times a day  senna 2 Tablet(s) Oral at bedtime  tiotropium 2.5 MICROgram(s) Inhaler 2 Puff(s) Inhalation daily    MEDICATIONS (PRN):albuterol/ipratropium for Nebulization 3 milliLiter(s) Nebulizer every 6 hours PRN Shortness of Breath and/or Wheezing  heparin   Injectable 5500 Unit(s) IV Push every 6 hours PRN For aPTT less than 40  heparin   Injectable 2500 Unit(s) IV Push every 6 hours PRN For aPTT between 40 - 57    --------------------------------------------------------------------------------------------    Vitals:   T(C): 36.8 (02-16-24 @ 12:00), Max: 36.9 (02-16-24 @ 02:30)  HR: 37 (02-16-24 @ 12:00) (37 - 85)  BP: 128/56 (02-16-24 @ 12:00) (124/58 - 143/74)  RR: 18 (02-16-24 @ 12:00) (17 - 18)  SpO2: 100% (02-16-24 @ 12:00) (94% - 100%)      02-16 @ 07:01  -  02-16 @ 14:37  --------------------------------------------------------  IN:  Total IN: 0 mL    OUT:    Blood Loss (mL): 350 mL  Total OUT: 350 mL    Total NET: -350 mL        Height (cm): 167.6 (02-16 @ 02:40)  Weight (kg): 70.3 (02-16 @ 02:40)  BMI (kg/m2): 25 (02-16 @ 02:40)  BSA (m2): 1.79 (02-16 @ 02:40)    PHYSICAL EXAM:   General: NAD, Lying in bed comfortably  Neuro: A+Ox3  HEENT: NC/AT, EOMI + glasses  Neck: Soft, supple, no crepitus   Cardio: irregularly irregular, normal rate  Resp: no accessory muscle use, sat  on 2L NC, stable when O2 discontinued, no chest  scars   GI/Abd: sntnd  --------------------------------------------------------------------------------------------    LABS  CBC (02-16 @ 13:35)                              9.6<L>                         9.13    )----------------(  164        --    % Neutrophils, --    % Lymphocytes, ANC: --                                  29.9<L>  CBC (02-16 @ 05:54)                              10.0<L>                         9.13    )----------------(  171        62.3  % Neutrophils, 18.2  % Lymphocytes, ANC: 5.69                                31.6<L>    BMP (02-16 @ 05:54)             142     |  98      |  49<H> 		Ca++ --      Ca 8.9                ---------------------------------( 104<H>		Mg 2.60               4.4     |  33<H>   |  1.41<H>			Ph 4.1     BMP (02-15 @ 08:50)             141     |  100     |  49<H> 		Ca++ --      Ca 9.1                ---------------------------------( 105<H>		Mg 2.7<H>             4.2     |  37<H>   |  1.52<H>			Ph --        LFTs (02-16 @ 05:54)      TPro 7.3 / Alb 3.0<L> / TBili 0.4 / DBili -- / AST 25 / ALT 16 / AlkPhos 74    Coags (02-16 @ 05:54)  aPTT 76.8<H> / INR 1.09 / PT 12.2  Coags (02-15 @ 08:50)  aPTT 72.5<H> / INR -- / PT --          --------------------------------------------------------------------------------------------    MICROBIOLOGY  Urinalysis (02-16 @ 05:54):     Color:  / Appearance:  / SG:  / pH:  / Gluc: 104<H> / Ketones:  / Bili:  / Urobili:  / Protein : / Nitrites:  / Leuk.Est:  / RBC:  / WBC:  / Sq Epi:  / Non Sq Epi:  / Bacteria        -> Pleural Fl Pleural Fluid Culture (02-08 @ 12:40)       No polymorphonuclear leukocytes seen per low power field  No organisms seen per oil power field    NG    Culture is being performed. Fungal cultures are held for 4 weeks.      --------------------------------------------------------------------------------------------

## 2024-02-16 NOTE — PHYSICAL THERAPY INITIAL EVALUATION ADULT - ADDITIONAL COMMENTS
Pt left semisupine in bed in NAD, all lines intact, call bell in reach, bed alarm on, SPO2 100%, +NC at 2L, and RN Vonda aware.

## 2024-02-16 NOTE — DIETITIAN INITIAL EVALUATION ADULT - REASON FOR ADMISSION
Admission for fitting and adjustment of non-vascular catheter, not elsewhere classified  Per chart review, Patient is a 89M PMHx A. fib on Eliquis, CHF (EF 45-50%, combined systolic and diastolic), CVA, HTN, COPD, CKD3, multiple hospitalizations for CHF exacerbation in the prior months, left sided pleural effusion that was drained last hospitalization, admitted to [facility] on 2/6/2024 for CHF exacerbation and acute on chronic hypoxic respiratory failure 2/2 large left pleural effusion. Patient transfered to [facility] for PleurX catheter and possible EUS for lymph node biopsy.

## 2024-02-16 NOTE — DIETITIAN INITIAL EVALUATION ADULT - PERTINENT LABORATORY DATA
02-16    142  |  98  |  49<H>  ----------------------------<  104<H>  4.4   |  33<H>  |  1.41<H>    Ca    8.9      16 Feb 2024 05:54  Phos  4.1     02-16  Mg     2.60     02-16    TPro  7.3  /  Alb  3.0<L>  /  TBili  0.4  /  DBili  x   /  AST  25  /  ALT  16  /  AlkPhos  74  02-16  A1C with Estimated Average Glucose Result: 5.9 % (02-07-24 @ 06:49)  A1C with Estimated Average Glucose Result: 5.8 % (01-03-24 @ 06:46)  A1C with Estimated Average Glucose Result: 5.8 % (09-20-23 @ 09:53)

## 2024-02-16 NOTE — DIETITIAN INITIAL EVALUATION ADULT - PROBLEM SELECTOR PLAN 2
s/p multiple thoracenteses with most recent thoracentesis resulting in removal of 1L of serosanguinous fluid   Cytopathology negative for malignant cells   Pleural fluid from 2/8 meets Light's criteria - exudative   CT chest from 2/8 shows "Lingular and left lower lobe groundglass infiltrates likely pneumonia. Small bilateral pleural effusions. Cardiomegaly. Stable prominent mediastinal lymph nodes."   Pleural fluid Cx unremarkable   - Pulmonology evaluation in AM

## 2024-02-16 NOTE — DIETITIAN NUTRITION RISK NOTIFICATION - TREATMENT: THE FOLLOWING DIET HAS BEEN RECOMMENDED
Diet, DASH/TLC:   Sodium & Cholesterol Restricted  Minced and Moist (MINCEDMOIST) (02-16-24 @ 05:05) [Active]

## 2024-02-16 NOTE — H&P ADULT - PROBLEM SELECTOR PLAN 8
DVT: Heparin gtt   Diet: DASH minced and moist diet pending dysphagia screen   Dispo: pending clinical progress, PT consulted

## 2024-02-16 NOTE — PATIENT PROFILE ADULT - FALL HARM RISK - HARM RISK INTERVENTIONS

## 2024-02-16 NOTE — H&P ADULT - PROBLEM SELECTOR PLAN 5
High risk of COPD contributing to acute hypoxic respiratory failure due to smoking history   - Duonebs q6h PRN   - C/w symbicort and spiriva

## 2024-02-16 NOTE — DIETITIAN INITIAL EVALUATION ADULT - ORAL INTAKE PTA/DIET HISTORY
NKFA.  Patient is alert, confused and sleepy when RD visited.  Answered some simple questions however unable to concentrate in nutrition interview, limited information obtained from pt.  Per chart review, pt was receiving a Renal diet and Nepro supplement and on 1000ml fluid restriction per 2/7/24 RD note.

## 2024-02-16 NOTE — PROGRESS NOTE ADULT - PROBLEM SELECTOR PLAN 1
Patient admitted for acute hypoxic respiratory failure 2/2 recurrence of large left pleural effusion   Patient also has CHF and COPD   Currently on 2L NC  CT chest from 2/8 shows "Lingular and left lower lobe groundglass infiltrates likely pneumonia. Small bilateral pleural effusions. Cardiomegaly. Stable prominent mediastinal lymph nodes."   - Wean O2 as tolerated  Thoracic surgery consulted

## 2024-02-16 NOTE — PROGRESS NOTE ADULT - SUBJECTIVE AND OBJECTIVE BOX
[facility] Division of Hospital Medicine  Mariya Almendarez MD  Hospitalist   Pager [phone]  Avaliable via MS teams     SUBJECTIVE / OVERNIGHT EVENTS: Pt seen and examined. patient tired appearing. no acute complaints.     ADDITIONAL REVIEW OF SYSTEMS:    MEDICATIONS  (STANDING):  allopurinol 100 milliGRAM(s) Oral daily  aspirin enteric coated 81 milliGRAM(s) Oral daily  atorvastatin 40 milliGRAM(s) Oral at bedtime  budesonide 160 MICROgram(s)/formoterol 4.5 MICROgram(s) Inhaler 2 Puff(s) Inhalation two times a day  furosemide   Injectable 40 milliGRAM(s) IV Push daily  heparin  Infusion. 900 Unit(s)/Hr (9 mL/Hr) IV Continuous <Continuous>  metoprolol succinate ER 50 milliGRAM(s) Oral daily  pantoprazole    Tablet 40 milliGRAM(s) Oral before breakfast  polyethylene glycol 3350 17 Gram(s) Oral two times a day  senna 2 Tablet(s) Oral at bedtime  tiotropium 2.5 MICROgram(s) Inhaler 2 Puff(s) Inhalation daily    MEDICATIONS  (PRN):  albuterol/ipratropium for Nebulization 3 milliLiter(s) Nebulizer every 6 hours PRN Shortness of Breath and/or Wheezing  heparin   Injectable 5500 Unit(s) IV Push every 6 hours PRN For aPTT less than 40  heparin   Injectable 2500 Unit(s) IV Push every 6 hours PRN For aPTT between 40 - 57      I&O's Summary    16 Feb 2024 07:01  -  16 Feb 2024 17:48  --------------------------------------------------------  IN: 0 mL / OUT: 575 mL / NET: -575 mL        PHYSICAL EXAM:  Vital Signs Last 24 Hrs  T(C): 36.4 (16 Feb 2024 17:10), Max: 36.9 (16 Feb 2024 02:30)  T(F): 97.5 (16 Feb 2024 17:10), Max: 98.4 (16 Feb 2024 02:30)  HR: 68 (16 Feb 2024 17:10) (37 - 85)  BP: 135/76 (16 Feb 2024 17:10) (124/58 - 143/74)  BP(mean): 86 (15 Feb 2024 20:10) (86 - 86)  RR: 18 (16 Feb 2024 17:10) (17 - 18)  SpO2: 100% (16 Feb 2024 17:10) (94% - 100%)    Parameters below as of 16 Feb 2024 17:10  Patient On (Oxygen Delivery Method): nasal cannula  O2 Flow (L/min): 2    CONSTITUTIONAL: NAD , tired appearing   EYES: EOMI, PERRL  ENT: no rhinorrhea, no pharyngeal erythema  RESPIRATORY: No increased work of breathing, CTAB, no wheezes or crackles appreciated  CARDIOVASCULAR: RRR, S1 and S2 present, no m/r/g  ABDOMEN: soft, NT, ND, bowel sounds present  EXTREMITIES: No LE edema  MUSCULOSKELETAL: no joint swelling, no tenderness to palpation  NEURO: A&Ox3, moving all extremities    LABS:                        9.6    9.13  )-----------( 164      ( 16 Feb 2024 13:35 )             29.9     02-16    142  |  98  |  49<H>  ----------------------------<  104<H>  4.4   |  33<H>  |  1.41<H>    Ca    8.9      16 Feb 2024 05:54  Phos  4.1     02-16  Mg     2.60     02-16    TPro  7.3  /  Alb  3.0<L>  /  TBili  0.4  /  DBili  x   /  AST  25  /  ALT  16  /  AlkPhos  74  02-16    PT/INR - ( 16 Feb 2024 05:54 )   PT: 12.2 sec;   INR: 1.09 ratio         PTT - ( 16 Feb 2024 14:35 )  PTT:103.8 sec      Urinalysis Basic - ( 16 Feb 2024 05:54 )    Color: x / Appearance: x / SG: x / pH: x  Gluc: 104 mg/dL / Ketone: x  / Bili: x / Urobili: x   Blood: x / Protein: x / Nitrite: x   Leuk Esterase: x / RBC: x / WBC x   Sq Epi: x / Non Sq Epi: x / Bacteria: x        SARS-CoV-2: NotDetec (06 Feb 2024 18:22)  SARS-CoV-2: NotDetec (18 Dec 2023 10:15)  SARS-CoV-2: NotDetec (22 Nov 2023 13:35)  SARS-CoV-2: NotDetec (15 Oct 2023 22:50)  SARS-CoV-2: NotDetec (18 Sep 2023 19:50)      RADIOLOGY & ADDITIONAL TESTS:  New Results Reviewed Today:   New Imaging Personally Reviewed Today:  New Electrocardiogram Personally Reviewed Today:  Prior or Outpatient Records Reviewed Today:    COMMUNICATION:  Care Discussed with Consultants/Other Providers and Details of Discussion:  Discussions with Patient/Family:  PCP Communication:

## 2024-02-16 NOTE — H&P ADULT - PROBLEM SELECTOR PLAN 3
Hx of CHF   TTE on 2/7 showed EF 55-60%, pulmonary hypertension, moderate mitral stenosis   - Cardiology consult in AM for possible structural heart evaluation   - Diurese with IV Lasix 40 mg qd   - Monitor I/Os TTE on 2/7 showed EF 55-60%, moderate pulmonary hypertension, moderate mitral stenosis, severely enlarged b/l atria   - Cardiology consult in AM for possible structural heart evaluation   - Diurese with IV Lasix 40 mg qd   - Monitor I/Os TTE on 2/7 showed EF 55-60%, moderate pulmonary hypertension, moderate mitral stenosis, severely enlarged b/l atria   - Diurese with IV Lasix 40 mg qd   - Monitor I/Os

## 2024-02-16 NOTE — DIETITIAN INITIAL EVALUATION ADULT - OTHER INFO
Patient is a limited historian at time of visit, A&O x2-3 at baseline per chart.  Patient is receiving a mince and moist consistency, DASH/TLC diet during hospital course.  Pt denied difficulty chewing or swallowing.  Defer diet consistency per MD and team discretion.  No GI distress such as nausea, vomit, diarrhea, constipation.  Pt is on bowel regimen (senna/miralax).  Skin noted left leg blister with 3+ left ankle edema, no pressure injury per RN flowsheets.    CBW 70.3kg (2-16), height 167.6cm, BMI 25-slightly overweight status.  Per [facility] weight history: 74.6kg (1-4-24), 75.8kg (11-25-23), 73.5kg (2-15-23).  Noted 4.3 kg/9.5 lbs/ 5.7% weight loss in 1 month period, noted h/o 3+ edema per chart review, weight loss in likely related to fluid shift and variable po intake.  Labs 2/16 reviewed with elevated BUN 49 and Creatinine 1.4L, w/ history of IRINEO on CKD.  Patient declined nutritional supplement when offered at this time.  Patient is visually observed with moderate fat/muscle wasting, meeting criteria for malnutrition.

## 2024-02-16 NOTE — H&P ADULT - PROBLEM SELECTOR PLAN 2
s/p thoracentesis with removal of 1L of serosanguinous fluid   Cytopathology negative for malignant cells   Pleural fluid from 2/8 meets Light's criteria - exudative   CT chest from 2/8 shows "Lingular and left lower lobe groundglass infiltrates likely pneumonia. Small bilateral pleural effusions. Cardiomegaly. Stable prominent mediastinal lymph nodes."   Pleural fluid Cx unremarkable   - Pulmonology evaluation in AM   - Thoracic surgery evaluation in AM s/p multiple thoracenteses with most recent thoracentesis resulting in removal of 1L of serosanguinous fluid   Cytopathology negative for malignant cells   Pleural fluid from 2/8 meets Light's criteria - exudative   CT chest from 2/8 shows "Lingular and left lower lobe groundglass infiltrates likely pneumonia. Small bilateral pleural effusions. Cardiomegaly. Stable prominent mediastinal lymph nodes."   Pleural fluid Cx unremarkable   - Pulmonology evaluation in AM   - Thoracic surgery evaluation in AM s/p multiple thoracenteses with most recent thoracentesis resulting in removal of 1L of serosanguinous fluid   Cytopathology negative for malignant cells   Pleural fluid from 2/8 meets Light's criteria - exudative   CT chest from 2/8 shows "Lingular and left lower lobe groundglass infiltrates likely pneumonia. Small bilateral pleural effusions. Cardiomegaly. Stable prominent mediastinal lymph nodes."   Pleural fluid Cx unremarkable   - Pulmonology evaluation in AM

## 2024-02-16 NOTE — H&P ADULT - ASSESSMENT
89M PMHx A. fib on Eliquis, CHF (EF 45-50%, combined systolic and diastolic), CVA, HTN, COPD, CKD3, multiple hospitalizations for CHF exacerbation in the prior months, left sided pleural effusion that was drained last hospitalization, admitted to [facility] on 2/6/2024 for CHF exacerbation and acute on chronic hypoxic respiratory failure 2/2 large left pleural effusion. Patient transfered to [facility] for PleurX catheter and possible EUS for lymph node biopsy.

## 2024-02-16 NOTE — DIETITIAN INITIAL EVALUATION ADULT - PERTINENT MEDS FT
MEDICATIONS  (STANDING):  allopurinol 100 milliGRAM(s) Oral daily  aspirin enteric coated 81 milliGRAM(s) Oral daily  atorvastatin 40 milliGRAM(s) Oral at bedtime  budesonide 160 MICROgram(s)/formoterol 4.5 MICROgram(s) Inhaler 2 Puff(s) Inhalation two times a day  furosemide   Injectable 40 milliGRAM(s) IV Push daily  heparin  Infusion. 900 Unit(s)/Hr (9 mL/Hr) IV Continuous <Continuous>  metoprolol succinate ER 50 milliGRAM(s) Oral daily  pantoprazole    Tablet 40 milliGRAM(s) Oral before breakfast  polyethylene glycol 3350 17 Gram(s) Oral two times a day  senna 2 Tablet(s) Oral at bedtime  tiotropium 2.5 MICROgram(s) Inhaler 2 Puff(s) Inhalation daily    MEDICATIONS  (PRN):  albuterol/ipratropium for Nebulization 3 milliLiter(s) Nebulizer every 6 hours PRN Shortness of Breath and/or Wheezing  heparin   Injectable 5500 Unit(s) IV Push every 6 hours PRN For aPTT less than 40  heparin   Injectable 2500 Unit(s) IV Push every 6 hours PRN For aPTT between 40 - 57

## 2024-02-16 NOTE — PHYSICAL THERAPY INITIAL EVALUATION ADULT - GENERAL OBSERVATIONS, REHAB EVAL
Pt encountered in semi-supine position in NAD, all lines intact, a&ox2, SPO2 100%, +NC at 2L, and RN Vonda aware.

## 2024-02-16 NOTE — H&P ADULT - HISTORY OF PRESENT ILLNESS
89M PMHx A. fib on Eliquis, CHF (EF 45-50%, combined systolic and diastolic), CVA, HTN, COPD, CKD3, multiple hospitalizations for CHF exacerbation in the prior months, left sided pleural effusion that was drained last hospitalization, admitted to [facility] on 2/6/2024 for CHF exacerbation and acute on chronic hypoxic respiratory failure 2/2 large left pleural effusion. TTE on 2/7 showed EF 55-60%, pulmonary hypertension, moderate mitral stenosis. He was evaluated by Pulmonary and had Thoracentesis on 2/8, 1 L bloody output drained.  Pulmonology recommended transfer to [facility] for Pleurex catheter and possible EUS for lymph node biopsy.  Patient placed on IV lasix for diuresis.  Pt was also found with 2.5 x 1.4 cm right retroareolar breast nodule larger than 9/19/2023.   Surgery was consulted and recommended outpatient follow-up. Patient also found with LLE blister, which is chronic.  Pt also with IRINEO 2/2 CKD3 on this admission, with Hypernatremia and Hypokalemia, K was repleted. Patient transitioned from Eliquis to heparin gtt during admission due to valvular heart disease. Patient was also evaluated by cardiology for moderate mitral stenosis, CHF, and a. fib, was diuresed with IV Lasix, treated with metoprolol, and recommended for structural heart evaluation knowing that patient is a poor candidate for procedural intervention due to comorbidities. Patient was then transferred to [facility].   Of note, patient was recently admitted in 1/2024 presenting with concern for LE blisters, found to have acute on chronic CHF, large exudative pleural effusion s/p 1L removal, transferred from  for thoracic surgery evaluation and was found to be a poor surgical candidate. IR was consulted and recommended that patient had no indication for a pigtail catheter placement due to resolution of dyspnea. Patient was then discharged.   On interview, patient denies chest pain, abdominal pain, reports difficulty urinating, and reports breathing is manageable. Patient endorses productive cough. Patient reports being willing to undergo thoracentesis or PleurX catheter.  89M PMHx A. susana previously on Eliquis, CHF (EF 45-50%, combined systolic and diastolic), CVA, HTN, COPD, CKD3, multiple hospitalizations for CHF exacerbation in the prior months, left sided pleural effusion that was drained last hospitalization, admitted to [facility] on 2/6/2024 for CHF exacerbation and acute on chronic hypoxic respiratory failure 2/2 large left pleural effusion. TTE on 2/7 showed EF 55-60%, pulmonary hypertension, moderate mitral stenosis. He was evaluated by Pulmonary and had Thoracentesis on 2/8, 1 L bloody output drained.  Pulmonology recommended transfer to [facility] for Pleurex catheter and possible EUS for lymph node biopsy.  Patient placed on IV lasix for diuresis.  Pt was also found with 2.5 x 1.4 cm right retroareolar breast nodule larger than 9/19/2023.   Surgery was consulted and recommended outpatient follow-up. Patient also found with LLE blister, which is chronic.  Pt also with IRINEO 2/2 CKD3 on this admission, with Hypernatremia and Hypokalemia, K was repleted. Patient transitioned from Eliquis to heparin gtt during admission due to valvular heart disease. Patient was also evaluated by cardiology for moderate mitral stenosis, CHF, and a. fib, was diuresed with IV Lasix, treated with metoprolol, and recommended for structural heart evaluation knowing that patient is a poor candidate for procedural intervention due to comorbidities. Patient was then transferred to [facility].   Of note, patient was recently admitted in 1/2024 presenting with concern for LE blisters, found to have acute on chronic CHF, large exudative pleural effusion s/p 1L removal, transferred from  for thoracic surgery evaluation and was found to be a poor surgical candidate. IR was consulted and recommended that patient had no indication for a pigtail catheter placement due to resolution of dyspnea. Patient was then discharged.   On interview, patient denies chest pain, abdominal pain, reports difficulty urinating, and reports breathing is manageable. Patient endorses productive cough. Patient reports being willing to undergo thoracentesis or PleurX catheter.

## 2024-02-16 NOTE — DIETITIAN INITIAL EVALUATION ADULT - PROBLEM SELECTOR PLAN 3
TTE on 2/7 showed EF 55-60%, moderate pulmonary hypertension, moderate mitral stenosis, severely enlarged b/l atria   - Diurese with IV Lasix 40 mg qd   - Monitor I/Os

## 2024-02-16 NOTE — PHYSICAL THERAPY INITIAL EVALUATION ADULT - PERTINENT HX OF CURRENT PROBLEM, REHAB EVAL
Patient is a 89 year old male, PMH stated below, pt was admitted to [facility] on 2/6/2024 for CHF exacerbation and acute on chronic hypoxic respiratory failure secondary to large left pleural effusion. TTE on 2/7 showed EF 55-60%, pulmonary hypertension, moderate mitral stenosis. He was evaluated by Pulmonary and had Thoracentesis on 2/8, 1 L bloody output drained.  Pulmonology recommended transfer to [facility] for Pleurex catheter and possible EUS for lymph node biopsy.

## 2024-02-16 NOTE — H&P ADULT - PROBLEM SELECTOR PLAN 4
CT chest shows "2.5 x 1.4 cm right retroareolar breast nodule larger than 9/19/2023"   - Surgery consulted at OSH, recommended outpatient follow-up

## 2024-02-16 NOTE — PATIENT PROFILE ADULT - NSPRESCRALCFREQ_GEN_A_NUR
Detail Level: Detailed
2-4 times a month
Size Of Lesion In Cm (Optional): 0.4
X Size Of Lesion In Cm (Optional): 0.3
Morphology Per Location (Optional): See attached photo

## 2024-02-16 NOTE — H&P ADULT - PROBLEM SELECTOR PLAN 6
Hx of A. fib   - Monitor on telemetry   - C/w metoprolol succinate 50 mg qd   - C/w Heparin gtt for full anticoagulation   - Keep Mg > 2 and K+ > 4

## 2024-02-17 NOTE — PROGRESS NOTE ADULT - PROBLEM SELECTOR PLAN 1
Patient admitted for acute hypoxic respiratory failure 2/2 recurrence of large left pleural effusion   Patient also has CHF and COPD   Currently on 2L NC  CT chest from 2/8 shows "Lingular and left lower lobe groundglass infiltrates likely pneumonia. Small bilateral pleural effusions. Cardiomegaly. Stable prominent mediastinal lymph nodes."   - Wean O2 as tolerated  Thoracic surgery consulted Patient admitted for acute hypoxic respiratory failure 2/2 recurrence of large left pleural effusion   Patient also has CHF and COPD   Currently on 2L NC  CT chest from 2/8 shows "Lingular and left lower lobe groundglass infiltrates likely pneumonia. Small bilateral pleural effusions. Cardiomegaly. Stable prominent mediastinal lymph nodes."   - Wean O2 as tolerated  CXR: There is increase in the left pleural effusion now moderate to large with associated consolidation.  Thoracic surgery consulted-  discussed need for thoracentesis prior to Pleurx given cxr findings and mild shortness of breath on exam- as per thoracic surgery team can hold off on thoracentesis for now

## 2024-02-17 NOTE — PROGRESS NOTE ADULT - SUBJECTIVE AND OBJECTIVE BOX
Thoracic Surgery Progress Note    SUBJECTIVE: Patient seen and examined at bedside with surgical team. Pt w. questions regarding pleurx and drainage, discussed at length w/ daughter at bedside. Pt endorsing mild SOB compared to baseline. Pt did not endorse chest pain, fevers/chills, N/V.    Vital Signs Last 24 Hrs  T(C): 36.9 (17 Feb 2024 13:00), Max: 36.9 (17 Feb 2024 13:00)  T(F): 98.4 (17 Feb 2024 13:00), Max: 98.4 (17 Feb 2024 13:00)  HR: 84 (17 Feb 2024 13:00) (68 - 99)  BP: 133/74 (17 Feb 2024 13:00) (127/76 - 144/72)  BP(mean): --  RR: 17 (17 Feb 2024 13:00) (17 - 18)  SpO2: 99% (17 Feb 2024 13:00) (99% - 100%)    Parameters below as of 17 Feb 2024 13:00  Patient On (Oxygen Delivery Method): nasal cannula  O2 Flow (L/min): 2  I&O's Detail    16 Feb 2024 07:01  -  17 Feb 2024 07:00  --------------------------------------------------------  IN:  Total IN: 0 mL    OUT:    Voided (mL): 1375 mL  Total OUT: 1375 mL    Total NET: -1375 mL        Medications  MEDICATIONS  (STANDING):  allopurinol 100 milliGRAM(s) Oral daily  aspirin enteric coated 81 milliGRAM(s) Oral daily  atorvastatin 40 milliGRAM(s) Oral at bedtime  budesonide 160 MICROgram(s)/formoterol 4.5 MICROgram(s) Inhaler 2 Puff(s) Inhalation two times a day  furosemide   Injectable 40 milliGRAM(s) IV Push daily  heparin  Infusion. 900 Unit(s)/Hr (9 mL/Hr) IV Continuous <Continuous>  metoprolol succinate ER 50 milliGRAM(s) Oral daily  pantoprazole    Tablet 40 milliGRAM(s) Oral before breakfast  polyethylene glycol 3350 17 Gram(s) Oral two times a day  senna 2 Tablet(s) Oral at bedtime  tiotropium 2.5 MICROgram(s) Inhaler 2 Puff(s) Inhalation daily    MEDICATIONS  (PRN):  albuterol/ipratropium for Nebulization 3 milliLiter(s) Nebulizer every 6 hours PRN Shortness of Breath and/or Wheezing  heparin   Injectable 5500 Unit(s) IV Push every 6 hours PRN For aPTT less than 40  heparin   Injectable 2500 Unit(s) IV Push every 6 hours PRN For aPTT between 40 - 57    Physical Exam  Constitutional: A&Ox3, NAD  Respiratory: Breathing comfortably on 2L NC, symmetrical chest rise. No resp accessory muscle use.  Cards: S1, S2  Gastrointestinal: Soft nontender, nondistended  Extremities: Moving all extremities, no edema    LABS:                        10.3   9.59  )-----------( 178      ( 17 Feb 2024 06:18 )             33.2     02-17    141  |  97<L>  |  42<H>  ----------------------------<  86  4.7   |  34<H>  |  1.33<H>    Ca    9.1      17 Feb 2024 06:18  Phos  4.7     02-17  Mg     2.60     02-17    TPro  7.3  /  Alb  3.0<L>  /  TBili  0.4  /  DBili  x   /  AST  25  /  ALT  16  /  AlkPhos  74  02-16    PT/INR - ( 16 Feb 2024 05:54 )   PT: 12.2 sec;   INR: 1.09 ratio         PTT - ( 17 Feb 2024 06:18 )  PTT:84.1 sec  LIVER FUNCTIONS - ( 16 Feb 2024 05:54 )  Alb: 3.0 g/dL / Pro: 7.3 g/dL / ALK PHOS: 74 U/L / ALT: 16 U/L / AST: 25 U/L / GGT: x           Urinalysis Basic - ( 17 Feb 2024 06:18 )    Color: x / Appearance: x / SG: x / pH: x  Gluc: 86 mg/dL / Ketone: x  / Bili: x / Urobili: x   Blood: x / Protein: x / Nitrite: x   Leuk Esterase: x / RBC: x / WBC x   Sq Epi: x / Non Sq Epi: x / Bacteria: x           Thoracic Surgery Progress Note    SUBJECTIVE: Patient seen and examined at bedside with surgical team. Pt w. questions regarding pleurx and drainage, discussed at length w/ daughter at bedside. Pt endorsing mild SOB compared to baseline. Pt did not endorse chest pain, fevers/chills, N/V.    Vital Signs Last 24 Hrs  T(C): 36.9 (17 Feb 2024 13:00), Max: 36.9 (17 Feb 2024 13:00)  T(F): 98.4 (17 Feb 2024 13:00), Max: 98.4 (17 Feb 2024 13:00)  HR: 84 (17 Feb 2024 13:00) (68 - 99)  BP: 133/74 (17 Feb 2024 13:00) (127/76 - 144/72)  BP(mean): --  RR: 17 (17 Feb 2024 13:00) (17 - 18)  SpO2: 99% (17 Feb 2024 13:00) (99% - 100%)    Parameters below as of 17 Feb 2024 13:00  Patient On (Oxygen Delivery Method): nasal cannula  O2 Flow (L/min): 2  I&O's Detail    16 Feb 2024 07:01  -  17 Feb 2024 07:00  --------------------------------------------------------  IN:  Total IN: 0 mL    OUT:    Voided (mL): 1375 mL  Total OUT: 1375 mL    Total NET: -1375 mL        Medications  MEDICATIONS  (STANDING):  allopurinol 100 milliGRAM(s) Oral daily  aspirin enteric coated 81 milliGRAM(s) Oral daily  atorvastatin 40 milliGRAM(s) Oral at bedtime  budesonide 160 MICROgram(s)/formoterol 4.5 MICROgram(s) Inhaler 2 Puff(s) Inhalation two times a day  furosemide   Injectable 40 milliGRAM(s) IV Push daily  heparin  Infusion. 900 Unit(s)/Hr (9 mL/Hr) IV Continuous <Continuous>  metoprolol succinate ER 50 milliGRAM(s) Oral daily  pantoprazole    Tablet 40 milliGRAM(s) Oral before breakfast  polyethylene glycol 3350 17 Gram(s) Oral two times a day  senna 2 Tablet(s) Oral at bedtime  tiotropium 2.5 MICROgram(s) Inhaler 2 Puff(s) Inhalation daily    MEDICATIONS  (PRN):  albuterol/ipratropium for Nebulization 3 milliLiter(s) Nebulizer every 6 hours PRN Shortness of Breath and/or Wheezing  heparin   Injectable 5500 Unit(s) IV Push every 6 hours PRN For aPTT less than 40  heparin   Injectable 2500 Unit(s) IV Push every 6 hours PRN For aPTT between 40 - 57    Physical Exam  Constitutional: A&Ox3, NAD  Respiratory: Breathing comfortably on 2L NC, symmetrical chest rise. No resp accessory muscle use.  Cards: S1, S2  Gastrointestinal: Soft nontender, nondistended  Extremities: Moving all extremities, LE edema    LABS:                        10.3   9.59  )-----------( 178      ( 17 Feb 2024 06:18 )             33.2     02-17    141  |  97<L>  |  42<H>  ----------------------------<  86  4.7   |  34<H>  |  1.33<H>    Ca    9.1      17 Feb 2024 06:18  Phos  4.7     02-17  Mg     2.60     02-17    TPro  7.3  /  Alb  3.0<L>  /  TBili  0.4  /  DBili  x   /  AST  25  /  ALT  16  /  AlkPhos  74  02-16    PT/INR - ( 16 Feb 2024 05:54 )   PT: 12.2 sec;   INR: 1.09 ratio         PTT - ( 17 Feb 2024 06:18 )  PTT:84.1 sec  LIVER FUNCTIONS - ( 16 Feb 2024 05:54 )  Alb: 3.0 g/dL / Pro: 7.3 g/dL / ALK PHOS: 74 U/L / ALT: 16 U/L / AST: 25 U/L / GGT: x           Urinalysis Basic - ( 17 Feb 2024 06:18 )    Color: x / Appearance: x / SG: x / pH: x  Gluc: 86 mg/dL / Ketone: x  / Bili: x / Urobili: x   Blood: x / Protein: x / Nitrite: x   Leuk Esterase: x / RBC: x / WBC x   Sq Epi: x / Non Sq Epi: x / Bacteria: x

## 2024-02-17 NOTE — PROGRESS NOTE ADULT - ASSESSMENT
89M PMHx A. fib on Eliquis, CHF (EF 45-50%, combined systolic and diastolic), CVA, HTN, COPD, CKD3, multiple hospitalizations for CHF exacerbation in the prior months, left sided pleural effusion that was drained last hospitalization, admitted to [facility] on 2/6/2024 for CHF exacerbation and acute on chronic hypoxic respiratory failure 2/2 large left pleural effusion. Patient transfered to [facility] for PleurX catheter and possible EUS for lymph node biopsy.

## 2024-02-17 NOTE — PROGRESS NOTE ADULT - PROBLEM SELECTOR PLAN 8
DVT: Heparin gtt   Diet: DASH minced and moist diet pending dysphagia screen   Dispo: pending clinical progress, PT consulted    Spoke to patients daughter on phone on 2/16/24

## 2024-02-17 NOTE — PROGRESS NOTE ADULT - SUBJECTIVE AND OBJECTIVE BOX
[facility] Division of Hospital Medicine  Mariya Almendarez MD  Hospitalist   Pager [phone]  Avaliable via MS teams     SUBJECTIVE / OVERNIGHT EVENTS: Pt seen and examined. pt states feels fine.   spoke to patients daughter over phone on 2/16, as per daughter patient seems tired / difficulty breathing , states has happened multiple time and always improves significantly after thoracentesis.     ADDITIONAL REVIEW OF SYSTEMS:    MEDICATIONS  (STANDING):  allopurinol 100 milliGRAM(s) Oral daily  aspirin enteric coated 81 milliGRAM(s) Oral daily  atorvastatin 40 milliGRAM(s) Oral at bedtime  budesonide 160 MICROgram(s)/formoterol 4.5 MICROgram(s) Inhaler 2 Puff(s) Inhalation two times a day  furosemide   Injectable 40 milliGRAM(s) IV Push daily  heparin  Infusion. 900 Unit(s)/Hr (9 mL/Hr) IV Continuous <Continuous>  metoprolol succinate ER 50 milliGRAM(s) Oral daily  pantoprazole    Tablet 40 milliGRAM(s) Oral before breakfast  polyethylene glycol 3350 17 Gram(s) Oral two times a day  senna 2 Tablet(s) Oral at bedtime  tiotropium 2.5 MICROgram(s) Inhaler 2 Puff(s) Inhalation daily    MEDICATIONS  (PRN):  albuterol/ipratropium for Nebulization 3 milliLiter(s) Nebulizer every 6 hours PRN Shortness of Breath and/or Wheezing  heparin   Injectable 5500 Unit(s) IV Push every 6 hours PRN For aPTT less than 40  heparin   Injectable 2500 Unit(s) IV Push every 6 hours PRN For aPTT between 40 - 57      I&O's Summary    16 Feb 2024 07:01  -  16 Feb 2024 17:48  --------------------------------------------------------  IN: 0 mL / OUT: 575 mL / NET: -575 mL      Vital Signs Last 24 Hrs  T(C): 36.9 (17 Feb 2024 13:00), Max: 36.9 (17 Feb 2024 13:00)  T(F): 98.4 (17 Feb 2024 13:00), Max: 98.4 (17 Feb 2024 13:00)  HR: 84 (17 Feb 2024 13:00) (68 - 99)  BP: 133/74 (17 Feb 2024 13:00) (127/76 - 144/72)  BP(mean): --  RR: 17 (17 Feb 2024 13:00) (17 - 18)  SpO2: 99% (17 Feb 2024 13:00) (99% - 100%)    Parameters below as of 17 Feb 2024 13:00  Patient On (Oxygen Delivery Method): nasal cannula  O2 Flow (L/min): 2      CONSTITUTIONAL: NAD =  EYES: EOMI, PERRL  ENT: no rhinorrhea, no pharyngeal erythema  RESPIRATORY: No increased work of breathing, CTAB, no wheezes or crackles appreciated  CARDIOVASCULAR: RRR, S1 and S2 present, no m/r/g  ABDOMEN: soft, NT, ND, bowel sounds present  EXTREMITIES: No LE edema  MUSCULOSKELETAL: no joint swelling, no tenderness to palpation  NEURO: A&Ox3, moving all extremities      LABS:                          10.3   9.59  )-----------( 178      ( 17 Feb 2024 06:18 )             33.2     02-17    141  |  97<L>  |  42<H>  ----------------------------<  86  4.7   |  34<H>  |  1.33<H>    Ca    9.1      17 Feb 2024 06:18  Phos  4.7     02-17  Mg     2.60     02-17    TPro  7.3  /  Alb  3.0<L>  /  TBili  0.4  /  DBili  x   /  AST  25  /  ALT  16  /  AlkPhos  74  02-16    PT/INR - ( 16 Feb 2024 05:54 )   PT: 12.2 sec;   INR: 1.09 ratio         PTT - ( 17 Feb 2024 06:18 )  PTT:84.1 sec              SARS-CoV-2: NotDetec (06 Feb 2024 18:22)  SARS-CoV-2: NotDetec (18 Dec 2023 10:15)  SARS-CoV-2: NotDetec (22 Nov 2023 13:35)  SARS-CoV-2: NotDetec (15 Oct 2023 22:50)  SARS-CoV-2: NotDetec (18 Sep 2023 19:50)     [facility] Division of Hospital Medicine  Mariya Almendarez MD  Hospitalist   Pager [phone]  Avaliable via MS teams     SUBJECTIVE / OVERNIGHT EVENTS: Pt seen and examined. pt states feels fine.   spoke to patients daughter over phone on 2/16, as per daughter patient seems tired / difficulty breathing , states has happened multiple time and always improves significantly after thoracentesis.     ADDITIONAL REVIEW OF SYSTEMS:    MEDICATIONS  (STANDING):  allopurinol 100 milliGRAM(s) Oral daily  aspirin enteric coated 81 milliGRAM(s) Oral daily  atorvastatin 40 milliGRAM(s) Oral at bedtime  budesonide 160 MICROgram(s)/formoterol 4.5 MICROgram(s) Inhaler 2 Puff(s) Inhalation two times a day  furosemide   Injectable 40 milliGRAM(s) IV Push daily  heparin  Infusion. 900 Unit(s)/Hr (9 mL/Hr) IV Continuous <Continuous>  metoprolol succinate ER 50 milliGRAM(s) Oral daily  pantoprazole    Tablet 40 milliGRAM(s) Oral before breakfast  polyethylene glycol 3350 17 Gram(s) Oral two times a day  senna 2 Tablet(s) Oral at bedtime  tiotropium 2.5 MICROgram(s) Inhaler 2 Puff(s) Inhalation daily    MEDICATIONS  (PRN):  albuterol/ipratropium for Nebulization 3 milliLiter(s) Nebulizer every 6 hours PRN Shortness of Breath and/or Wheezing  heparin   Injectable 5500 Unit(s) IV Push every 6 hours PRN For aPTT less than 40  heparin   Injectable 2500 Unit(s) IV Push every 6 hours PRN For aPTT between 40 - 57      I&O's Summary    16 Feb 2024 07:01  -  16 Feb 2024 17:48  --------------------------------------------------------  IN: 0 mL / OUT: 575 mL / NET: -575 mL      Vital Signs Last 24 Hrs  T(C): 36.9 (17 Feb 2024 13:00), Max: 36.9 (17 Feb 2024 13:00)  T(F): 98.4 (17 Feb 2024 13:00), Max: 98.4 (17 Feb 2024 13:00)  HR: 84 (17 Feb 2024 13:00) (68 - 99)  BP: 133/74 (17 Feb 2024 13:00) (127/76 - 144/72)  BP(mean): --  RR: 17 (17 Feb 2024 13:00) (17 - 18)  SpO2: 99% (17 Feb 2024 13:00) (99% - 100%)    Parameters below as of 17 Feb 2024 13:00  Patient On (Oxygen Delivery Method): nasal cannula  O2 Flow (L/min): 2      CONSTITUTIONAL: NAD   EYES: EOMI, PERRL  ENT: no rhinorrhea, no pharyngeal erythema  RESPIRATORY: No increased work of breathing, CTAB, no wheezes or crackles appreciated  CARDIOVASCULAR: RRR, S1 and S2 present, no m/r/g  ABDOMEN: soft, NT, ND, bowel sounds present  EXTREMITIES: No LE edema  MUSCULOSKELETAL: no joint swelling, no tenderness to palpation  NEURO: A&Ox3, moving all extremities      LABS:                          10.3   9.59  )-----------( 178      ( 17 Feb 2024 06:18 )             33.2     02-17    141  |  97<L>  |  42<H>  ----------------------------<  86  4.7   |  34<H>  |  1.33<H>    Ca    9.1      17 Feb 2024 06:18  Phos  4.7     02-17  Mg     2.60     02-17    TPro  7.3  /  Alb  3.0<L>  /  TBili  0.4  /  DBili  x   /  AST  25  /  ALT  16  /  AlkPhos  74  02-16    PT/INR - ( 16 Feb 2024 05:54 )   PT: 12.2 sec;   INR: 1.09 ratio         PTT - ( 17 Feb 2024 06:18 )  PTT:84.1 sec              SARS-CoV-2: NotDetec (06 Feb 2024 18:22)  SARS-CoV-2: NotDetec (18 Dec 2023 10:15)  SARS-CoV-2: NotDetec (22 Nov 2023 13:35)  SARS-CoV-2: NotDetec (15 Oct 2023 22:50)  SARS-CoV-2: NotDetec (18 Sep 2023 19:50)

## 2024-02-18 NOTE — PROGRESS NOTE ADULT - ASSESSMENT
89M PMHx A. fib on Eliquis, CHF (EF 45-50%, combined systolic and diastolic), CVA, HTN, COPD, CKD3, multiple hospitalizations for CHF exacerbation in the prior months, left sided pleural effusion that was drained last hospitalization, admitted to [facility] on 2/6/2024 for CHF exacerbation and acute on chronic hypoxic respiratory failure 2/2 large left pleural effusion. Patient transfered to [facility] for PleurX catheter and possible EUS for lymph node biopsy.

## 2024-02-18 NOTE — DISCHARGE NOTE PROVIDER - NSDCCPTREATMENT_GEN_ALL_CORE_FT
PRINCIPAL PROCEDURE  Procedure: CT chest wo contrast  Findings and Treatment: FINDINGS:  LUNGS AND AIRWAYS: Patent central airways.  Lingular and left lower lobe   groundglass infiltrates. Bilateral lower lobe discoid atelectasis.  PLEURA: Small bilateral pleural effusions greater on the left. No   pneumothorax.  MEDIASTINUM AND YANIQUE: Stable prominent mediastinal lymph nodes measuring   up to 1.2 cm short axis, predominantly pretracheal and precarinal.  VESSELS: Aortic and coronary atherosclerosis.  HEART: Moderate cardiomegaly. Low attenuation cardiac blood pool   secondary to anemia. Mitral annular calcifications. No pericardial   effusion.  CHEST WALL AND LOWER NECK: 2.5 x 1.4 cm right retroareolar breast nodule   unchanged from prior but larger than 9/19/2023. Stable left gynecomastia.  VISUALIZED UPPER ABDOMEN: Bilateral renal cysts. Left renal cortical   scarring. Cholelithiasis.  BONES: Osteopenia. Mild degenerative changes.  IMPRESSION:  2.5 x 1.4 cm right retroareolar breast nodule larger than 9/19/2023.   Recommend ultrasound evaluation and surgery consultation.  Lingular and left lower lobe groundglass infiltrates likely pneumonia.  Small bilateral pleural effusions. Cardiomegaly.  Stable prominent mediastinal lymph nodes.  Redemonstrated asymmetrical gynecomastia.

## 2024-02-18 NOTE — PROGRESS NOTE ADULT - PROBLEM SELECTOR PLAN 1
Patient admitted for acute hypoxic respiratory failure 2/2 recurrence of large left pleural effusion   Patient also has CHF and COPD   Currently on 2L NC  CT chest from 2/8 shows "Lingular and left lower lobe groundglass infiltrates likely pneumonia. Small bilateral pleural effusions. Cardiomegaly. Stable prominent mediastinal lymph nodes."   - Wean O2 as tolerated  CXR: There is increase in the left pleural effusion now moderate to large with associated consolidation.  Thoracic surgery consulted-  discussed need for thoracentesis prior to Pleurx given cxr findings and mild shortness of breath on exam- as per thoracic surgery team can hold off on thoracentesis for now

## 2024-02-18 NOTE — DISCHARGE NOTE PROVIDER - NSDCFUSCHEDAPPT_GEN_ALL_CORE_FT
[address]  Scheduled Appointment: 03/25/2024    Gasper Puente  [address]  Scheduled Appointment: 03/25/2024

## 2024-02-18 NOTE — DISCHARGE NOTE PROVIDER - NSDCCPCAREPLAN_GEN_ALL_CORE_FT
PRINCIPAL DISCHARGE DIAGNOSIS  Diagnosis: Pleural effusion  Assessment and Plan of Treatment: You were transferred from outside hospital due to the fact that you keep reaccumulating fluid in your lungs. You were evalauted by thoraic surgery and you had _________.     PRINCIPAL DISCHARGE DIAGNOSIS  Diagnosis: Acute hypoxic respiratory failure  Assessment and Plan of Treatment:       SECONDARY DISCHARGE DIAGNOSES  Diagnosis: Pleural effusion  Assessment and Plan of Treatment: You were transferred from outside hospital due to the fact that you keep reaccumulating fluid in your lungs. You were evalauted by thoraic surgery and you had _________.

## 2024-02-18 NOTE — DISCHARGE NOTE PROVIDER - NSDCMRMEDTOKEN_GEN_ALL_CORE_FT
acetaminophen 325 mg oral tablet: 2 tab(s) orally every 6 hours As needed Mild Pain (1 - 3)  albuterol 90 mcg/inh inhalation aerosol: 2 puff(s) inhaled every 6 hours As needed Shortness of Breath and/or Wheezing  allopurinol 100 mg oral tablet: 1 tab(s) orally once a day  aluminum hydroxide-magnesium hydroxide 200 mg-200 mg/5 mL oral suspension: 30 milliliter(s) orally every 4 hours As needed Dyspepsia  apixaban 2.5 mg oral tablet: 1 tab(s) orally every 12 hours  aspirin 81 mg oral delayed release tablet: 1 tab(s) orally once a day  atorvastatin 40 mg oral tablet: 1 tab(s) orally once a day (at bedtime)  budesonide-formoterol 160 mcg-4.5 mcg/inh inhalation aerosol: 2 puff(s) inhaled 2 times a day  furosemide 100 mg/100 mL-0.9% intravenous solution: 40 milliliter(s) intravenous once a day  melatonin 3 mg oral tablet: 1 tab(s) orally once a day (at bedtime) As needed Insomnia  metoprolol succinate 50 mg oral tablet, extended release: 1 tab(s) orally once a day  pantoprazole 40 mg oral delayed release tablet: 1 tab(s) orally once a day (before a meal)  petrolatum topical ointment: 1 Apply topically to affected area 3 times a day  polyethylene glycol 3350 oral powder for reconstitution: 17 gram(s) orally 2 times a day  senna leaf extract oral tablet: 2 tab(s) orally once a day (at bedtime)  tiotropium 2.5 mcg/inh inhalation aerosol: 2 puff(s) inhaled once a day  triamcinolone 0.1% topical ointment: Apply topically to affected area 3 times a day Apply topically to bumps on legs two times a day MDD: 3 apps

## 2024-02-18 NOTE — RAPID RESPONSE TEAM SUMMARY - NSSITUATIONBACKGROUNDRRT_GEN_ALL_CORE
89M PMHx A. fib on Eliquis, CHF (EF 45-50%, combined systolic and diastolic), CVA, HTN, COPD, CKD3, multiple hospitalizations for CHF exacerbation in the prior months, left sided pleural effusion that was drained last hospitalization, admitted to [facility] on 2/6/2024 for CHF exacerbation and acute on chronic hypoxic respiratory failure 2/2 large left pleural effusion. Patient transferred to [facility] for PleurX catheter and possible EUS for lymph node biopsy.     RRT called for hypoxia to low 80s/high 70s. On arrival, pt refusing to wear NC and making rude comments to staff. When asked why he doesn't want to wear NC he stated "because you're asking me to." Attempted to educate pt on why he needs supplemental oxygen and how low O2 may cause him to require the ventilator. Asked pt what he thinks will happen if he doesn't wear NC and stated "everything, everything will happen." Pt AO1-2, unable to state why he's in the hospital. Per primary team pt has been confused and agitated throughout the day and required a dose of Seroquel early in the day. When nurse disconnected IV pt swiped at nurse and tried to pull IV. Code CHARLY called; pt moved to bed, haldol 5mg IV given, and placed back on NC. Vitals /84, , O2 sat 93%. Consider starting low dose seroquel at bedtime.  89M PMHx A. fib on Eliquis, CHF (EF 45-50%, combined systolic and diastolic), CVA, HTN, COPD, CKD3, multiple hospitalizations for CHF exacerbation in the prior months, left sided pleural effusion that was drained last hospitalization, admitted to [facility] on 2/6/2024 for CHF exacerbation and acute on chronic hypoxic respiratory failure 2/2 large left pleural effusion. Patient transferred to [facility] for PleurX catheter and possible EUS for lymph node biopsy.     RRT called for hypoxia to low 80s/high 70s. On arrival, pt refusing to wear NC and making rude comments to staff. When asked why he doesn't want to wear NC he stated "because you're asking me to." Attempted to educate pt on why he needs supplemental oxygen and how low O2 may cause him to require the ventilator. Asked pt what he thinks will happen if he doesn't wear NC and stated "everything, everything will happen." Pt AO1-2, unable to state why he's in the hospital. Per primary team pt has been confused and agitated throughout the day and required a dose of Seroquel early in the day. When nurse disconnected IV pt swiped at nurse and tried to pull IV. Code CHARLY called; pt moved to bed, haldol 5mg IV given, and placed back on NC. Vitals /84, , O2 sat 93%. Consider starting low dose seroquel at bedtime. Recommend GOC conversation.

## 2024-02-18 NOTE — PROGRESS NOTE ADULT - SUBJECTIVE AND OBJECTIVE BOX
[facility] Division of Hospital Medicine  Mariya Almendarez MD  Hospitalist   Pager [phone]  Avaliable via MS teams     SUBJECTIVE / OVERNIGHT EVENTS: Pt seen and examined. pt states feels fine. denies any complaints.   spoke to patients daughter over phone on 2/16, as per daughter patient seems tired / difficulty breathing , states has happened multiple time and always improves significantly after thoracentesis.     ADDITIONAL REVIEW OF SYSTEMS:    MEDICATIONS  (STANDING):  allopurinol 100 milliGRAM(s) Oral daily  aspirin enteric coated 81 milliGRAM(s) Oral daily  atorvastatin 40 milliGRAM(s) Oral at bedtime  budesonide 160 MICROgram(s)/formoterol 4.5 MICROgram(s) Inhaler 2 Puff(s) Inhalation two times a day  furosemide   Injectable 40 milliGRAM(s) IV Push daily  heparin  Infusion. 900 Unit(s)/Hr (9 mL/Hr) IV Continuous <Continuous>  metoprolol succinate ER 50 milliGRAM(s) Oral daily  pantoprazole    Tablet 40 milliGRAM(s) Oral before breakfast  polyethylene glycol 3350 17 Gram(s) Oral two times a day  senna 2 Tablet(s) Oral at bedtime  tiotropium 2.5 MICROgram(s) Inhaler 2 Puff(s) Inhalation daily    MEDICATIONS  (PRN):  albuterol/ipratropium for Nebulization 3 milliLiter(s) Nebulizer every 6 hours PRN Shortness of Breath and/or Wheezing  heparin   Injectable 5500 Unit(s) IV Push every 6 hours PRN For aPTT less than 40  heparin   Injectable 2500 Unit(s) IV Push every 6 hours PRN For aPTT between 40 - 57      I&O's Summary    17 Feb 2024 07:01  -  18 Feb 2024 07:00  --------------------------------------------------------  IN: 422 mL / OUT: 845 mL / NET: -423 mL          Vital Signs Last 24 Hrs  T(C): 37 (18 Feb 2024 05:30), Max: 37 (18 Feb 2024 05:30)  T(F): 98.6 (18 Feb 2024 05:30), Max: 98.6 (18 Feb 2024 05:30)  HR: 69 (18 Feb 2024 05:30) (69 - 97)  BP: 143/61 (18 Feb 2024 05:30) (112/66 - 143/61)  BP(mean): --  RR: 18 (18 Feb 2024 05:30) (17 - 18)  SpO2: 100% (18 Feb 2024 05:30) (97% - 100%)    Parameters below as of 18 Feb 2024 05:30  Patient On (Oxygen Delivery Method): nasal cannula  O2 Flow (L/min): 2      CONSTITUTIONAL: NAD   EYES: EOMI, PERRL  ENT: no rhinorrhea, no pharyngeal erythema  RESPIRATORY: No increased work of breathing, CTAB, no wheezes or crackles appreciated  CARDIOVASCULAR: RRR, S1 and S2 present, no m/r/g  ABDOMEN: soft, NT, ND, bowel sounds present  EXTREMITIES: No LE edema  MUSCULOSKELETAL: no joint swelling, no tenderness to palpation  NEURO: A&Ox3, moving all extremities      LABS:                          10.2   8.71  )-----------( 180      ( 18 Feb 2024 05:30 )             33.6     02-18    138  |  95<L>  |  42<H>  ----------------------------<  102<H>  5.3   |  37<H>  |  1.39<H>    Ca    9.1      18 Feb 2024 05:30  Phos  4.2     02-18  Mg     2.60     02-18      PTT - ( 18 Feb 2024 05:30 )  PTT:71.6 sec           PTT - ( 17 Feb 2024 06:18 )  PTT:84.1 sec              SARS-CoV-2: NotDetec (06 Feb 2024 18:22)  SARS-CoV-2: NotDetec (18 Dec 2023 10:15)  SARS-CoV-2: NotDetec (22 Nov 2023 13:35)  SARS-CoV-2: NotDetec (15 Oct 2023 22:50)  SARS-CoV-2: NotDetec (18 Sep 2023 19:50)

## 2024-02-18 NOTE — CHART NOTE - NSCHARTNOTEFT_GEN_A_CORE
Patient is agitated and yelling at his family and the staff. He is refusing to wear his nasal cannula and he is desaturating.   Patient threw his nasal cannula at writer. Performed chest PT at bedside and patient coughed up copious amount of phlegm. Agreed to wear nasal cannula following education however still remains agitated. Will give low dose seroquel. Continue with duonebs and O2. Will check RVP.

## 2024-02-18 NOTE — CHART NOTE - NSCHARTNOTEFT_GEN_A_CORE
[facility] Night Coverage     RRT called for hypoxia to high 70's 2/2 to non-compliance with nasal cannula.   Patient seen and assessed at bedside, sitting in chair in NAD. AOx2, agitated, refusing to wear NC despite education. [facility] Night Coverage     RRT called for hypoxia to high 70's 2/2 to non-compliance with nasal cannula.   Patient seen and assessed at bedside, sitting in chair in NAD. AOx2, agitated, refusing to wear NC despite education.   Escalated to CODE CHARLY, Haldol 5mg administered. 4LNC placed back on patient.   Vitals: ; /84; RR 20; SPO2 93%; .       Maris Diaz PA-C  Department of Medicine   [id number] [facility] Medicine Night Coverage     RRT called for hypoxia to high 70's 2/2 to non-compliance with nasal cannula.   Patient seen and assessed at bedside, sitting in chair in NAD. AOx2, agitated, refusing to wear NC despite education.   Escalated to CODE CHARLY, Haldol 5mg administered. 4LNC placed back on patient.   Vitals: ; /84; RR 20; SPO2 93%; .     [ ] Discussed with [facility], Dr. Benito.     Maris Diaz PA-C  Department of Medicine   [id number]

## 2024-02-18 NOTE — DISCHARGE NOTE PROVIDER - HOSPITAL COURSE
89M PMHx A. fib on Eliquis, CHF (EF 45-50%, combined systolic and diastolic), CVA, HTN, COPD, CKD3, multiple hospitalizations for CHF exacerbation in the prior months, left sided pleural effusion that was drained last hospitalization, admitted to [facility] on 2/6/2024 for CHF exacerbation and acute on chronic hypoxic respiratory failure 2/2 large left pleural effusion. Patient transfered to [facility] for PleurX catheter and possible EUS for lymph node biopsy.        Problem/Plan - 1:  ·  Problem: Acute hypoxic respiratory failure.   ·  Plan: Patient admitted for acute hypoxic respiratory failure 2/2 recurrence of large left pleural effusion   Patient also has CHF and COPD   Currently on 2L NC  CT chest from 2/8 shows "Lingular and left lower lobe groundglass infiltrates likely pneumonia. Small bilateral pleural effusions. Cardiomegaly. Stable prominent mediastinal lymph nodes."   - Wean O2 as tolerated  CXR: There is increase in the left pleural effusion now moderate to large with associated consolidation.  Thoracic surgery consulted-  discussed need for thoracentesis prior to Pleurx given cxr findings and mild shortness of breath on exam- as per thoracic surgery team can hold off on thoracentesis for now.  Plan for Pleurex catheter next week      Problem/Plan - 2:  ·  Problem: Pleural effusion.   ·  Plan: s/p multiple thoracenteses with most recent thoracentesis resulting in removal of 1L of serosanguinous fluid   Cytopathology negative for malignant cells   Pleural fluid from 2/8 meets Light's criteria - exudative   CT chest from 2/8 shows "Lingular and left lower lobe groundglass infiltrates likely pneumonia. Small bilateral pleural effusions. Cardiomegaly. Stable prominent mediastinal lymph nodes."   Pleural fluid Cx unremarkable   As per pulm- consult thoracic surgery for pleurex.     Problem/Plan - 3:  ·  Problem: Congestive heart failure.   ·  Plan: TTE on 2/7 showed EF 55-60%, moderate pulmonary hypertension, moderate mitral stenosis, severely enlarged b/l atria   - Diurese with IV Lasix 40 mg qd   - Monitor I/Os.     Problem/Plan - 4:  ·  Problem: Breast mass in male.   ·  Plan: CT chest shows "2.5 x 1.4 cm right retroareolar breast nodule larger than 9/19/2023"   - Surgery consulted at OSH, recommended outpatient follow-up.     Problem/Plan - 5:  ·  Problem: COPD, severity to be determined.   ·  Plan: High risk of COPD contributing to acute hypoxic respiratory failure due to smoking history   - Duonebs q6h PRN   - C/w symbicort and spiriva.     Problem/Plan - 6:  ·  Problem: Atrial fibrillation.   ·  Plan: Hx of A. fib   - Monitor on telemetry   - C/w metoprolol succinate 50 mg qd   - C/w Heparin gtt for full anticoagulation   - Keep Mg > 2 and K+ > 4.     Problem/Plan - 7:  ·  Problem: Stage 3 chronic kidney disease.   ·  Plan: Patient's Cr on admission appears to be similar to prior baseline Cr   Patient appears to have developed rising Cr within the past 3 years   - Continue to monitor.   89M PMHx A. fib on Eliquis, CHF (EF 45-50%, combined systolic and diastolic), CVA, HTN, COPD, CKD3, multiple hospitalizations for CHF exacerbation in the prior months, left sided pleural effusion that was drained last hospitalization, admitted to [facility] on 2/6/2024 for CHF exacerbation and acute on chronic hypoxic respiratory failure 2/2 large left pleural effusion. Patient transfered to [facility] for thoracic surgery eval       # Acute hypoxic respiratory failure.    Patient admitted for acute hypoxic respiratory failure 2/2 recurrence of large left pleural effusion   Hospitalization was complicated by covid positive and pneumonia.  Currently on HFNC 60L 60%  -s/p PTC 2/23/24 by thoracic surgery  -on IV Zosyn for PNA  -s/p 5 days of Remdesevir  -on steroids for Covid Pneumonia    #Pleural effusion.    s/p multiple thoracenteses with most recent thoracentesis resulting in removal of 1L of serosanguinous fluid   Cytopathology negative for malignant cells   Pleural fluid from 2/8 meets Light's criteria - exudative   -now s/p PTC      #IRINEO on Stage 3 chronic kidney disease.   given IVFs  -trend creat  -renally dose medications

## 2024-02-19 NOTE — PROGRESS NOTE ADULT - PROBLEM SELECTOR PLAN 4
- CT chest 2/8 demonstrating 2.5 x 1.4 cm right retroareolar breast nodule increased in size since 9/2023 assessment  - Surgery consulted at OSH, recommended outpatient follow-up

## 2024-02-19 NOTE — SWALLOW BEDSIDE ASSESSMENT ADULT - ADDITIONAL RECOMMENDATIONS
1). Pt. with baseline cough therefore; aspiration unable to be ruled out at bedside. Given chest imaging, respiratory status and complaint of new onset of dysphagia, further testing is warranted.   2). This service to f/u as scheduling permits pending MD order for Cine Esophagram   3). Medical team to re-consult as warranted

## 2024-02-19 NOTE — SWALLOW BEDSIDE ASSESSMENT ADULT - ORAL PHASE
suspected posterior loss Decreased anterior-posterior movement of the bolus/Delayed oral transit time

## 2024-02-19 NOTE — PROGRESS NOTE ADULT - PROBLEM SELECTOR PLAN 8
DVT: Heparin gtt   Diet: S/p SLP evaluation - NPO for now - reconsult 2/20  Dispo: pending clinical progress, PT consulted    Discussed plan of care with daughter Frida and son-in-law Grant

## 2024-02-19 NOTE — SWALLOW BEDSIDE ASSESSMENT ADULT - ASR SWALLOW RECOMMEND DIAG
Instrumental study is indicated given baseline cough, chest imaging and report of dysphagia at bedside/VFSS/MBS

## 2024-02-19 NOTE — PROGRESS NOTE ADULT - PROBLEM SELECTOR PLAN 3
- TTE 2/7 demonstrating EF 55-60%, moderate pulmonary hypertension, moderate mitral stenosis, severely enlarged b/l atria   - C/w Lasix 40 IV daily  - Monitor I/Os

## 2024-02-19 NOTE — PROGRESS NOTE ADULT - SUBJECTIVE AND OBJECTIVE BOX
Contact Information:  Rossy Jackson II, MD, MPH  Internal Medicine    RAD [facility], MRN-[id number]    Patient is a 89y old  Male who presents with a chief complaint of Transfer for PleurX (18 Feb 2024 20:27)      OVERNIGHT EVENTS/INTERVAL/SUBJECTIVE: RRT overnight due to hypoxia in setting of nonadherence to NC, escalated to code CHARLY, s/p Haldol with placement of NC. Patient evaluated at bedside, has a cough and SOB possibly in the setting of COVID, which he states he believes he does not have. He also repeatedly requests for medical staff to "help him". Given his repetition of his request for provider to help him, unable to ascertain information regarding other ROS.      OBJECTIVE:  Vital Signs Last 24 Hrs  T(C): 36.7 (19 Feb 2024 13:10), Max: 36.9 (19 Feb 2024 05:40)  T(F): 98.1 (19 Feb 2024 13:10), Max: 98.4 (19 Feb 2024 05:40)  HR: 96 (19 Feb 2024 13:10) (79 - 99)  BP: 130/50 (19 Feb 2024 13:10) (129/78 - 142/82)  BP(mean): --  RR: 17 (19 Feb 2024 13:10) (17 - 18)  SpO2: 92% (19 Feb 2024 13:10) (92% - 99%)    Parameters below as of 19 Feb 2024 13:10  Patient On (Oxygen Delivery Method): nasal cannula  O2 Flow (L/min): 3    I&O's Summary    18 Feb 2024 07:01  -  19 Feb 2024 07:00  --------------------------------------------------------  IN: 904 mL / OUT: 450 mL / NET: 454 mL        MEDICATIONS  (STANDING):  acetylcysteine 10%  Inhalation 4 milliLiter(s) Inhalation three times a day  allopurinol 100 milliGRAM(s) Oral daily  aspirin enteric coated 81 milliGRAM(s) Oral daily  atorvastatin 40 milliGRAM(s) Oral at bedtime  budesonide 160 MICROgram(s)/formoterol 4.5 MICROgram(s) Inhaler 2 Puff(s) Inhalation two times a day  furosemide   Injectable 40 milliGRAM(s) IV Push daily  heparin  Infusion. 900 Unit(s)/Hr (9 mL/Hr) IV Continuous <Continuous>  metoprolol succinate ER 50 milliGRAM(s) Oral daily  pantoprazole    Tablet 40 milliGRAM(s) Oral before breakfast  polyethylene glycol 3350 17 Gram(s) Oral two times a day  senna 2 Tablet(s) Oral at bedtime  tiotropium 2.5 MICROgram(s) Inhaler 2 Puff(s) Inhalation daily    MEDICATIONS  (PRN):  albuterol/ipratropium for Nebulization 3 milliLiter(s) Nebulizer every 6 hours PRN Shortness of Breath and/or Wheezing  heparin   Injectable 5500 Unit(s) IV Push every 6 hours PRN For aPTT less than 40  heparin   Injectable 2500 Unit(s) IV Push every 6 hours PRN For aPTT between 40 - 57    Allergies    No Known Allergies    Intolerances        CONSTITUTIONAL: No acute distress. Awake and alert.  RESPIRATORY: CTAB. No wheezes, rales, or rhonchi. No accessory muscle use. No apparent respiratory distress.  CARDIOVASCULAR: +S1/S2. No audible S3/S4. Regular rate and rhythm. No murmurs, rubs, or gallops. No LE swelling or edema.  GASTROINTESTINAL: Soft, nontender, nondistended. +BS. No rebound or guarding.   MUSCULOSKELETAL: Spontaneous movement in all extremities.  NEUROLOGICAL: CN 2-12 grossly intact. No focal deficits. Sensation intact x 4EXT.   PSYCHIATRIC: Appropriate affect. A&Ox1-2.                              9.9    7.63  )-----------( 170      ( 19 Feb 2024 04:32 )             31.8     PTT - ( 19 Feb 2024 04:32 )  PTT:83.0 sec  02-19    138  |  94<L>  |  48<H>  ----------------------------<  114<H>  4.8   |  33<H>  |  1.52<H>    Ca    9.1      19 Feb 2024 04:32  Phos  4.3     02-19  Mg     2.60     02-19      CAPILLARY BLOOD GLUCOSE              Urinalysis Basic - ( 19 Feb 2024 04:32 )    Color: x / Appearance: x / SG: x / pH: x  Gluc: 114 mg/dL / Ketone: x  / Bili: x / Urobili: x   Blood: x / Protein: x / Nitrite: x   Leuk Esterase: x / RBC: x / WBC x   Sq Epi: x / Non Sq Epi: x / Bacteria: x            RADIOLOGY AND ADDITIONAL TESTS:    CONSULTANT NOTES REVIEWED:    CARE DISCUSSED WITH THE FOLLOWING CONSULTANTS/PROVIDERS:

## 2024-02-19 NOTE — SWALLOW BEDSIDE ASSESSMENT ADULT - SWALLOW EVAL: DIAGNOSIS
Pt. accepted trials of moderately thick liquids, mildly thick liquids, thin liquids and puree. 1. Mild oral dysphagia across trials marked by adequate bolus retrieval with complete oral containment. Prompt swallow initiation with slow bolus manipulation/formation. Suspected posterior loss of bolus trials with complete oral clearance noted. 2. Suspected pharyngeal dysphagia across trials marked by present swallow trigger judged via hyolaryngeal digital palpation. Cough noted across all trials with complaints of pharyngeal stasis.

## 2024-02-19 NOTE — PROGRESS NOTE ADULT - PROBLEM SELECTOR PLAN 1
- Patient admitted for acute hypoxic respiratory failure 2/2 recurrence of large left pleural effusion   - Patient also has CHF and COPD; also found to have COVID  - Currently on 2L NC  - CT chest 2/8 with lingular and left lower lobe groundglass infiltrates likely pneumonia. Small bilateral pleural effusions. Cardiomegaly. Stable prominent mediastinal lymph nodes  - CXR demonstrating increase in the left pleural effusion now moderate to large with associated consolidation  - Thoracic surgery consulted - plan for PleurX catheter, no need for thoracentesis at this time  - Wean O2 as tolerated

## 2024-02-19 NOTE — PROGRESS NOTE ADULT - PROBLEM SELECTOR PLAN 2
- s/p multiple thoracenteses with most recent thoracentesis resulting in removal of 1L of serosanguinous fluid   - Cytopathology negative for malignant cells; pleural fluid from 2/8 exudative by Light's criteria; pleural fluid Cx unremarkable   - Thoracic Surgery consult per Pulm recs - plan for PleurX catheter, no need for thoracentesis at this time

## 2024-02-19 NOTE — PROGRESS NOTE ADULT - ASSESSMENT
89M PMHx A. fib on Eliquis, CHF (EF 45-50%, combined systolic and diastolic), CVA, HTN, COPD, CKD3, multiple hospitalizations for CHF exacerbation in the prior months, left sided pleural effusion that was drained last hospitalization, admitted to [facility] on 2/6/2024 for CHF exacerbation and acute on chronic hypoxic respiratory failure 2/2 large left pleural effusion. Patient transferred to [facility] for PleurX catheter and possible EUS for lymph node biopsy.

## 2024-02-20 NOTE — PROVIDER CONTACT NOTE (OTHER) - ASSESSMENT
A&Ox4. vitals as per flowsheet. patient not in distress. Was found eating yogurt and attempting to throw cup away. Food was removed and re-educated on NPO status s/t failing speech and swallow consult. Patient continued to ask for food and fluids. Has been calling family to request food/fluids. Frida, Oriana, and Lora all made aware of safety issue. A&Ox4. vitals as per flowsheet. patient not in distress. Was found eating yogurt and attempting to throw cup away. Food was removed and re-educated on NPO status s/t failing speech and swallow consult. Patient continued to ask for food and fluids. Has been calling family to request food/fluids. Frida, Oriana, and Lora all made aware of safety issue. vitals as per flowsheet, not in distress.

## 2024-02-20 NOTE — PROGRESS NOTE ADULT - PROBLEM SELECTOR PLAN 8
- Baseline Cr 1.5-1.7  - Cr stable in the 1.3-1.5 range  - Continue to monitor  - Avoid nephrotoxic medications  - Renally dose all medications

## 2024-02-20 NOTE — CHART NOTE - NSCHARTNOTEFT_GEN_A_CORE
Subjective & objective:  Patient seen and examined at bedside with thoracic surgery.   Pt endorsing mild SOB and cough with productive sputum.  Pt did not endorse chest pain, fevers/chills, N/V, dizziness.    Vital Signs:  Vital Signs Last 24 Hrs  T(C): 36.6 (02-20-24 @ 17:18), Max: 36.8 (02-20-24 @ 11:30)  T(F): 97.8 (02-20-24 @ 17:18), Max: 98.2 (02-20-24 @ 11:30)  HR: 73 (02-20-24 @ 17:18) (73 - 105)  BP: 134/76 (02-20-24 @ 17:18) (104/55 - 135/55)  RR: 16 (02-20-24 @ 17:18) (16 - 18)  SpO2: 99% (02-20-24 @ 17:18) (93% - 100%) on (O2)    PE:  Constitutional: A&Ox3, NAD  Respiratory: Breathing comfortably on 2L NC, symmetrical chest rise. No resp accessory muscle use.  Cards: S1, S2  Gastrointestinal: Soft nontender, nondistended  Extremities: Moving all extremities, LE edema    Relevant labs, radiology and Medications reviewed                        10.9   6.94  )-----------( 199      ( 20 Feb 2024 05:35 )             36.6     02-20    140  |  94<L>  |  55<H>  ----------------------------<  84  4.9   |  32<H>  |  1.58<H>    Ca    9.6      20 Feb 2024 05:35  Phos  5.8     02-20  Mg     2.60     02-20      PTT - ( 20 Feb 2024 05:35 )  PTT:84.3 sec  MEDICATIONS  (STANDING):  allopurinol 100 milliGRAM(s) Oral daily  aspirin enteric coated 81 milliGRAM(s) Oral daily  atorvastatin 40 milliGRAM(s) Oral at bedtime  budesonide 160 MICROgram(s)/formoterol 4.5 MICROgram(s) Inhaler 2 Puff(s) Inhalation two times a day  cefTRIAXone   IVPB 1000 milliGRAM(s) IV Intermittent every 24 hours  furosemide   Injectable 40 milliGRAM(s) IV Push daily  heparin  Infusion. 900 Unit(s)/Hr (9 mL/Hr) IV Continuous <Continuous>  metoprolol succinate ER 50 milliGRAM(s) Oral daily  pantoprazole    Tablet 40 milliGRAM(s) Oral before breakfast  polyethylene glycol 3350 17 Gram(s) Oral two times a day  remdesivir  IVPB   IV Intermittent   senna 2 Tablet(s) Oral at bedtime  tiotropium 2.5 MICROgram(s) Inhaler 2 Puff(s) Inhalation daily    MEDICATIONS  (PRN):  albuterol/ipratropium for Nebulization 3 milliLiter(s) Nebulizer every 6 hours PRN Shortness of Breath and/or Wheezing  guaiFENesin Oral Liquid (Sugar-Free) 100 milliGRAM(s) Oral every 6 hours PRN Cough  heparin   Injectable 5500 Unit(s) IV Push every 6 hours PRN For aPTT less than 40  heparin   Injectable 2500 Unit(s) IV Push every 6 hours PRN For aPTT between 40 - 57    Pertinent Physical Exam  I&O's Summary    19 Feb 2024 07:01  -  20 Feb 2024 07:00  --------------------------------------------------------  IN: 104 mL / OUT: 400 mL / NET: -296 mL    < from: Xray Chest 1 View- PORTABLE-Urgent (Xray Chest 1 View- PORTABLE-Urgent .) (02.20.24 @ 09:34) >      ACC: [id number] EXAM:  XR CHEST PORTABLE URGENT 1V   ORDERED BY: CHRISTOPHER HOOPER     PROCEDURE DATE:  02/20/2024      INTERPRETATION:  EXAMINATION: XR CHEST URGENT    CLINICAL INDICATION: eval for effusion    TECHNIQUE: Single frontal, portable view of the chest was obtained.    COMPARISON: Chest x-ray evaluate for effusion.    FINDINGS:    The heart is enlarged.  Left mid to lower lung opacification. Right basilar opacity.  There is no pneumothorax. Moderate left pleural effusion.  No acute bony abnormality.    IMPRESSION:  Left mid to lower lung opacification could represent moderate left   pleural effusion with adjacent focus of subsegmental atelectasis. Right   basilar opacity likely represents right-sided subsegmental atelectasis.    --- End of Report ---    BLAIR ASIF MD; Resident Radiologist  This document has been electronically signed.  SHAMA VANEGAS MD; Attending Interventional Radiologist  This document has been electronically signed. Feb 20 2024  4:28PM    < end of copied text >    Assessment  89M with PMHx ACarissa meza previously on Eliquis, CHF (EF 45-50%, combined systolic and diastolic), CVA, HTN, COPD, CKD3, multiple hospitalizations for CHF exacerbation in the prior months, left sided pleural effusion that was drained last hospitalization, admitted to [facility] on 2/6/2024 for CHF exacerbation and acute on chronic hypoxic respiratory failure 2/2 large left pleural effusion. TTE on 2/7 showed EF 55-60%, pulmonary hypertension, moderate mitral stenosis. He was evaluated by Pulmonary and had Thoracentesis on 2/8, 1 L bloody output drained.  Pulmonology recommended transfer to [facility] for Pleurex catheter and possible EUS for lymph node biopsy.  Patient placed on IV lasix for diuresis. Patient tested COVID+ on 2/19    PLAN  - Will hold plan for Pleurx catheter placement at this time since pt is COVID +  - CXR w/ mod pleural effusion; Pt stable on 3L NC  - Daily CXR  - Please call w/ any further concerns to thoracic #[id number]  - Plan above as per Dr Garza

## 2024-02-20 NOTE — PROGRESS NOTE ADULT - SUBJECTIVE AND OBJECTIVE BOX
[facility] Division of Hospital Medicine  Lucina Lockwood MD  Available via MS Teams  Pager: [phone]    SUBJECTIVE / OVERNIGHT EVENTS:  no events. pt states is thirsty, wants to eat  npo as failed speech eval    MEDICATIONS  (STANDING):  acetylcysteine 10%  Inhalation 4 milliLiter(s) Inhalation three times a day  allopurinol 100 milliGRAM(s) Oral daily  aspirin enteric coated 81 milliGRAM(s) Oral daily  atorvastatin 40 milliGRAM(s) Oral at bedtime  budesonide 160 MICROgram(s)/formoterol 4.5 MICROgram(s) Inhaler 2 Puff(s) Inhalation two times a day  cefTRIAXone   IVPB 1000 milliGRAM(s) IV Intermittent every 24 hours  furosemide   Injectable 40 milliGRAM(s) IV Push daily  heparin  Infusion. 900 Unit(s)/Hr (9 mL/Hr) IV Continuous <Continuous>  metoprolol succinate ER 50 milliGRAM(s) Oral daily  pantoprazole    Tablet 40 milliGRAM(s) Oral before breakfast  polyethylene glycol 3350 17 Gram(s) Oral two times a day  remdesivir  IVPB   IV Intermittent   remdesivir  IVPB 200 milliGRAM(s) IV Intermittent every 24 hours  senna 2 Tablet(s) Oral at bedtime  tiotropium 2.5 MICROgram(s) Inhaler 2 Puff(s) Inhalation daily    MEDICATIONS  (PRN):  albuterol/ipratropium for Nebulization 3 milliLiter(s) Nebulizer every 6 hours PRN Shortness of Breath and/or Wheezing  heparin   Injectable 5500 Unit(s) IV Push every 6 hours PRN For aPTT less than 40  heparin   Injectable 2500 Unit(s) IV Push every 6 hours PRN For aPTT between 40 - 57      I&O's Summary    19 Feb 2024 07:01  -  20 Feb 2024 07:00  --------------------------------------------------------  IN: 104 mL / OUT: 400 mL / NET: -296 mL        PHYSICAL EXAM:  Vital Signs Last 24 Hrs  T(C): 36.7 (20 Feb 2024 05:44), Max: 36.7 (19 Feb 2024 22:02)  T(F): 98 (20 Feb 2024 05:44), Max: 98.1 (19 Feb 2024 22:02)  HR: 95 (20 Feb 2024 05:44) (85 - 105)  BP: 112/58 (20 Feb 2024 05:44) (104/55 - 135/55)  BP(mean): --  RR: 18 (20 Feb 2024 05:44) (17 - 18)  SpO2: 100% (20 Feb 2024 05:44) (95% - 100%)    Parameters below as of 20 Feb 2024 05:44  Patient On (Oxygen Delivery Method): nasal cannula  O2 Flow (L/min): 3    CONSTITUTIONAL: NAD  EYES: PERRLA; conjunctiva and sclera clear  ENMT: Moist oral mucosa, no pharyngeal injection or exudates  NECK: Supple, no palpable masses  RESPIRATORY: Normal respiratory effort; lungs are clear to auscultation bilaterally  CARDIOVASCULAR: Regular rate and rhythm, normal S1 and S2, no murmur/rub/gallop; No lower extremity edema; Peripheral pulses are 2+ bilaterally  ABDOMEN: Nontender to palpation, normoactive bowel sounds, no rebound/guarding  MUSCULOSKELETAL:  no clubbing or cyanosis of digits; no joint swelling or tenderness to palpation  PSYCH: A+O to person, place, and time; affect appropriate  NEUROLOGY: CN 2-12 are intact and symmetric; no gross sensory deficits   SKIN: No rashes; no palpable lesions    LABS:                        10.9   6.94  )-----------( 199      ( 20 Feb 2024 05:35 )             36.6     02-20    140  |  94<L>  |  55<H>  ----------------------------<  84  4.9   |  32<H>  |  1.58<H>    Ca    9.6      20 Feb 2024 05:35  Phos  5.8     02-20  Mg     2.60     02-20      PTT - ( 20 Feb 2024 05:35 )  PTT:84.3 sec      Urinalysis Basic - ( 20 Feb 2024 05:35 )    Color: x / Appearance: x / SG: x / pH: x  Gluc: 84 mg/dL / Ketone: x  / Bili: x / Urobili: x   Blood: x / Protein: x / Nitrite: x   Leuk Esterase: x / RBC: x / WBC x   Sq Epi: x / Non Sq Epi: x / Bacteria: x        SARS-CoV-2: Detected (19 Feb 2024 05:12)  SARS-CoV-2: NotDetec (06 Feb 2024 18:22)  SARS-CoV-2: NotDetec (18 Dec 2023 10:15)  SARS-CoV-2: NotDetec (22 Nov 2023 13:35)  SARS-CoV-2: NotDetec (15 Oct 2023 22:50)  SARS-CoV-2: NotDetec (18 Sep 2023 19:50)

## 2024-02-20 NOTE — PROGRESS NOTE ADULT - PROBLEM SELECTOR PLAN 1
- Patient admitted for acute hypoxic respiratory failure 2/2 recurrence of large left pleural effusion   - Patient also has CHF and COPD; also found to have COVID  - Currently on 2L NC  - CT chest 2/8 with lingular and left lower lobe groundglass infiltrates likely pneumonia. Small bilateral pleural effusions. Cardiomegaly. Stable prominent mediastinal lymph nodes  - CXR 2/16 demonstrating increase in the left pleural effusion now moderate to large with associated consolidation  - Thoracic surgery consulted - plan for likely VATs and plrx   -cards clearance for OR per thoracic sx, Dr. Crowley consulted   - Wean O2 as tolerated  -start CTX for consolidation on CXR, cough with sputum production  -check sputum cx, urine strep  -start Remdesevir for high risk covid+

## 2024-02-20 NOTE — PROGRESS NOTE ADULT - PROBLEM SELECTOR PLAN 2
-start Remdesevir for high risk covid+  -pt on O2 before covid diagnosis, defer steroids at this time  -robitussin prn

## 2024-02-20 NOTE — PROVIDER CONTACT NOTE (OTHER) - ASSESSMENT
A&Ox4. vitals as per flowsheet. patient denies pain, just discomfort at hematoma site. Marked area with sharpie. Not in distress.

## 2024-02-20 NOTE — PROGRESS NOTE ADULT - PROBLEM SELECTOR PLAN 3
- s/p multiple thoracenteses with most recent thoracentesis resulting in removal of 1L of serosanguinous fluid   - Cytopathology negative for malignant cells; pleural fluid from 2/8 exudative by Light's criteria; pleural fluid Cx unremarkable   - Thoracic Surgery consult per Pulm recs - plan for possible VATs/plrx placement

## 2024-02-20 NOTE — PROGRESS NOTE ADULT - ASSESSMENT
89M PMHx A. fib on Eliquis, CHF (EF 45-50%, combined systolic and diastolic), CVA, HTN, COPD, CKD3, multiple hospitalizations for CHF exacerbation in the prior months, left sided pleural effusion that was drained last hospitalization, admitted to [facility] on 2/6/2024 for CHF exacerbation and acute on chronic hypoxic respiratory failure 2/2 large left pleural effusion. Patient transferred to [facility] for thoracic surgery eval. Now pending possible VATs/plrx placement, failed speech eval pending MBS

## 2024-02-21 NOTE — SWALLOW VFSS/MBS ASSESSMENT ADULT - ORAL PHASE
Reduced anterior - posterior transport/Residue in oral cavity Residue in oral cavity/Incomplete tongue to palate contact/Uncontrolled bolus / spillover in hypopharynx Residue in oral cavity/Incomplete tongue to palate contact/Uncontrolled bolus / spillover in hypopharynx/Laryngeal penetration before swallow - silent Reduced anterior - posterior transport/Residue in oral cavity/Incomplete tongue to palate contact/Uncontrolled bolus / spillover in hypopharynx

## 2024-02-21 NOTE — SWALLOW VFSS/MBS ASSESSMENT ADULT - DEMONSTRATES NEED FOR REFERRAL TO ANOTHER SERVICE
Consider ENT consult given breathy vocal quality at MD's discretion; RD consult as patient may benefit from nutritional supplements; consider Neurology consult at MD's discretion given reduced sensation to Aspiration

## 2024-02-21 NOTE — PROGRESS NOTE ADULT - SUBJECTIVE AND OBJECTIVE BOX
[facility] Division of Hospital Medicine  Lucina Lockwood MD  Available via MS Teams  Pager: [phone]    SUBJECTIVE / OVERNIGHT EVENTS:  no events. pt thirsty requesting water, explained in english and with Kosovan  #[id number] the need for cinesophagram    MEDICATIONS  (STANDING):  allopurinol 100 milliGRAM(s) Oral daily  aspirin enteric coated 81 milliGRAM(s) Oral daily  atorvastatin 40 milliGRAM(s) Oral at bedtime  budesonide 160 MICROgram(s)/formoterol 4.5 MICROgram(s) Inhaler 2 Puff(s) Inhalation two times a day  cefTRIAXone   IVPB 1000 milliGRAM(s) IV Intermittent every 24 hours  heparin  Infusion. 900 Unit(s)/Hr (9 mL/Hr) IV Continuous <Continuous>  metoprolol succinate ER 50 milliGRAM(s) Oral daily  pantoprazole    Tablet 40 milliGRAM(s) Oral before breakfast  polyethylene glycol 3350 17 Gram(s) Oral two times a day  remdesivir  IVPB   IV Intermittent   remdesivir  IVPB 100 milliGRAM(s) IV Intermittent every 24 hours  senna 2 Tablet(s) Oral at bedtime  tiotropium 2.5 MICROgram(s) Inhaler 2 Puff(s) Inhalation daily    MEDICATIONS  (PRN):  albuterol/ipratropium for Nebulization 3 milliLiter(s) Nebulizer every 6 hours PRN Shortness of Breath and/or Wheezing  guaiFENesin Oral Liquid (Sugar-Free) 100 milliGRAM(s) Oral every 6 hours PRN Cough  heparin   Injectable 5500 Unit(s) IV Push every 6 hours PRN For aPTT less than 40  heparin   Injectable 2500 Unit(s) IV Push every 6 hours PRN For aPTT between 40 - 57      I&O's Summary      PHYSICAL EXAM:  Vital Signs Last 24 Hrs  T(C): 36.6 (21 Feb 2024 12:30), Max: 36.7 (21 Feb 2024 05:30)  T(F): 97.8 (21 Feb 2024 12:30), Max: 98 (21 Feb 2024 05:30)  HR: 84 (21 Feb 2024 12:30) (73 - 84)  BP: 142/78 (21 Feb 2024 12:30) (134/76 - 151/75)  BP(mean): --  RR: 17 (21 Feb 2024 12:30) (16 - 18)  SpO2: 100% (21 Feb 2024 12:30) (99% - 100%)    Parameters below as of 21 Feb 2024 12:30  Patient On (Oxygen Delivery Method): nasal cannula  O2 Flow (L/min): 3    CONSTITUTIONAL: NAD  EYES: PERRLA; conjunctiva and sclera clear  ENMT: dry oral mucosa, no pharyngeal injection or exudates  NECK: Supple, no palpable masses  RESPIRATORY: Normal respiratory effort; lungs are clear to auscultation bilaterally  CARDIOVASCULAR: Regular rate and rhythm, normal S1 and S2, no murmur/rub/gallop; No lower extremity edema; Peripheral pulses are 2+ bilaterally  ABDOMEN: Nontender to palpation, normoactive bowel sounds, no rebound/guarding  MUSCULOSKELETAL:  no clubbing or cyanosis of digits; no joint swelling or tenderness to palpation  PSYCH: A+O to person, place, and time; affect appropriate  NEUROLOGY: CN 2-12 are intact and symmetric; no gross sensory deficits   SKIN: No rashes; no palpable lesions    LABS:                        9.7    8.63  )-----------( 186      ( 21 Feb 2024 05:01 )             32.4     02-21    142  |  95<L>  |  71<H>  ----------------------------<  113<H>  4.5   |  33<H>  |  1.78<H>    Ca    9.1      21 Feb 2024 05:01  Phos  6.1     02-21  Mg     2.80     02-21      PTT - ( 21 Feb 2024 05:01 )  PTT:70.1 sec      Urinalysis Basic - ( 21 Feb 2024 05:01 )    Color: x / Appearance: x / SG: x / pH: x  Gluc: 113 mg/dL / Ketone: x  / Bili: x / Urobili: x   Blood: x / Protein: x / Nitrite: x   Leuk Esterase: x / RBC: x / WBC x   Sq Epi: x / Non Sq Epi: x / Bacteria: x        Culture - Sputum (collected 20 Feb 2024 19:10)  Source: .Sputum Sputum  Gram Stain (21 Feb 2024 07:06):    Few polymorphonuclear leukocytes per low power field    Few Squamous epithelial cells per low power field    Few Yeast like cells per oil power field    Moderate Gram Positive Cocci in Clusters per oil power field    Few Gram Positive Rods per oil power field    Few Gram Negative Rods per oil power field    Rare Gram Negative Diplococci per oil power field      SARS-CoV-2: Detected (19 Feb 2024 05:12)  SARS-CoV-2: NotDetec (06 Feb 2024 18:22)  SARS-CoV-2: NotDetec (18 Dec 2023 10:15)  SARS-CoV-2: NotDetec (22 Nov 2023 13:35)  SARS-CoV-2: NotDetec (15 Oct 2023 22:50)  SARS-CoV-2: NotDetec (18 Sep 2023 19:50)

## 2024-02-21 NOTE — PROGRESS NOTE ADULT - PROBLEM SELECTOR PLAN 3
- s/p multiple thoracenteses with most recent thoracentesis resulting in removal of 1L of serosanguinous fluid   - Cytopathology negative for malignant cells; pleural fluid from 2/8 exudative by Light's criteria; pleural fluid Cx unremarkable   - Thoracic Surgery consult per Pulm recs - plan for possible plrx placement- now on hold due to covid+

## 2024-02-21 NOTE — SWALLOW VFSS/MBS ASSESSMENT ADULT - COMMENTS
Internal Medicine 2/21, "89M PMHx A. fib on Eliquis, CHF (EF 45-50%, combined systolic and diastolic), CVA, HTN, COPD, CKD3, multiple hospitalizations for CHF exacerbation in the prior months, left sided pleural effusion that was drained last hospitalization, admitted to [facility] on 2/6/2024 for CHF exacerbation and acute on chronic hypoxic respiratory failure 2/2 large left pleural effusion. Patient transferred to [facility] for thoracic surgery eval. Now pending possible VATs/plrx placement, failed speech eval pending MBS"    Of note: Patient known to this service, seen for a bedside swallow evaluation on 2/19 with recommendations for "NPO; consideration for short-term non-oral means of nutrition/hydration; Instrumental study is indicated given baseline cough, chest imaging and report of dysphagia at bedside"    Patient received in Radiology Suite for a cinesophagram and was transferred into a specialized seating unit with lateral projection. Patient noted with O2 via nasal cannula, baseline weak cough and breathy vocal quality. Patient with reduced command following for multi-step directives. Patient able to make basic wants/ needs known in English; offered language line for Mongolian however, patient declined.

## 2024-02-21 NOTE — SWALLOW VFSS/MBS ASSESSMENT ADULT - RECOMMENDED CONSISTENCY
Diagnostic Impressions Continued: There was Aspiration during the swallow for Mildly Thick Liquids. There was Deep Laryngeal Penetration to the level of the vocal folds before the swallow for Thin Liquids with subsequent Gross Aspiration during the swallow. Patient with reduced sensation to the Aspiration; therefore, was verbally cued to produce a strong volitional cough. Patient noted with a weak cough and was unable to clear contrast from the airway/ trachea. Postural strategies not attempted given patient with reduced command following.      RECOMMENDATIONS:   1. Minced and Moist with Moderately Thick Liquids  2. Feeding and Swallowing Guidelines: Small spoonfuls, small single cup sips, reswallow x1-2 per bolus, alternate minced and moist with moderately thick liquids, upright with PO and at least 30 minutes following, slow rate of intake  3. Aspiration precautions  4. Oral Hygiene  5. Reflux precautions

## 2024-02-21 NOTE — SWALLOW VFSS/MBS ASSESSMENT ADULT - DIAGNOSTIC IMPRESSIONS
1. Mild-Moderate oral dysphagia for puree, soft and bite-sized solids, moderately thick liquids, mildly thick liquids, mildly thick liquids and thin liquids characterized by adequate oral containment, prolonged bolus manipulation puree/ soft and bite-sized> liquids, slow mastication of soft and bite-sized solids with slow anterior to posterior transport across consistencies with premature spillage to the hypopharynx for liquids (thin liquids> moderately thick liquids) due to reduced tongue to palate seal. There was mild oral residue noted post primary swallow; spontaneous reswallow or cued reswallow assists with oral clearance. 2. Mild pharyngeal dysphagia for puree, soft and bite-sized solids and moderately thick liquids characterized by delayed initiation of the pharyngeal swallow (bolus head at the vallecula), reduced base of tongue retraction, adequate epiglottic deflection, adequate hyolaryngeal excursion, adequate laryngeal vestibular closure, and reduced pharyngeal contractility. There was trace-mild pharyngeal clearance deficits located diffuse throughout the hypopharynx (vallecula>posterior pharyngeal wall/ pyriforms) for soft and bite-sized solids>puree. Liquid wash of moderately thick liquids partially assists in pharyngeal clearance. There was No Aspiration before, during or after the swallow for puree, soft and bite-sized solids and moderately thick liquids. 3. Severe pharyngeal dysphagia for mildly thick liquids and thin liquids characterized by delayed initiation of the pharyngeal swallow, reduced base of tongue retraction, reduced epiglottic deflection, reduced hyolaryngeal excursion, reduced laryngeal vestibular closure and reduced pharyngeal contractility. There was trace-mild pharyngeal clearance deficits located diffuse throughout the hypopharynx (vallecula>posterior pharyngeal wall/ pyriforms) post primary swallow; cued or spontaneous reswallow partially assisted in pharyngeal clearance. Continued Below

## 2024-02-21 NOTE — PROGRESS NOTE ADULT - PROBLEM SELECTOR PLAN 1
- Patient admitted for acute hypoxic respiratory failure 2/2 recurrence of large left pleural effusion   - Patient also has CHF and COPD; also found to have COVID  - Currently on 2L NC  - CT chest 2/8 with lingular and left lower lobe groundglass infiltrates likely pneumonia. Small bilateral pleural effusions. Cardiomegaly. Stable prominent mediastinal lymph nodes  - CXR 2/16 demonstrating increase in the left pleural effusion now moderate to large with associated consolidation  - Thoracic surgery consulted - plan for plrx, cleared by cards 2/21, however CT surg now deferring due to covid positive  - Wean O2 as tolerated  -will complete a course of CTX for consolidation on CXR, cough with sputum production (2/20-2/27)  -started Remdesevir for high risk covid+

## 2024-02-21 NOTE — SWALLOW VFSS/MBS ASSESSMENT ADULT - ADDITIONAL RECOMMENDATIONS
1. This service to follow for diet tolerance as schedule permits. 2. Medical team advised to reconsult this service if patient is with a change in medical status or change in tolerance of recommended PO. 3. Patient may benefit from swallowing therapy pending discharge plans (i.e., Rehab center vs. Homecare vs. Outpatient at [facility] [phone])

## 2024-02-21 NOTE — PROGRESS NOTE ADULT - PROBLEM SELECTOR PLAN 2
-started Remdesevir for high risk covid+  -pt on O2 before covid diagnosis, defer steroids at this time  -robitussin prn

## 2024-02-21 NOTE — PROGRESS NOTE ADULT - ASSESSMENT
89M PMHx A. fib on Eliquis, CHF (EF 45-50%, combined systolic and diastolic), CVA, HTN, COPD, CKD3, multiple hospitalizations for CHF exacerbation in the prior months, left sided pleural effusion that was drained last hospitalization, admitted to [facility] on 2/6/2024 for CHF exacerbation and acute on chronic hypoxic respiratory failure 2/2 large left pleural effusion. Patient transferred to [facility] for thoracic surgery eval. Now pending possible VATs/plrx placement, failed speech eval pending MBS

## 2024-02-21 NOTE — CONSULT NOTE ADULT - SUBJECTIVE AND OBJECTIVE BOX
Cardiovascular Disease Initial Evaluation  Date of Service: 02-21-24 @ 07:57    CHIEF COMPLAINT: SOB    HISTORY OF PRESENT ILLNESS:    This is a 89 year old man with a-fib, HTN, prior CVA, and COPD who was transferred to [facility] on 2/16/2024 for PleurX.  The patient was initially admitted to [facility] on 2/6/2024 for CHF exacerbation and acute on chronic hypoxic respiratory failure 2/2 large left pleural effusion. TTE on 2/7 showed EF 55-60%, pulmonary hypertension, moderate mitral stenosis. He was evaluated by Pulmonary and had Thoracentesis on 2/8, 1 L bloody output drained.  Pulmonology recommended transfer to [facility] for Pleurex catheter and possible EUS for lymph node biopsy.  Patient placed on IV lasix for diuresis.  Pt was also found with 2.5 x 1.4 cm right retroareolar breast nodule larger than 9/19/2023.   Surgery was consulted and recommended outpatient follow-up. Patient also found with LLE blister, which is chronic.  Pt also with IRINEO 2/2 CKD3 on this admission, with Hypernatremia and Hypokalemia, K was repleted. Patient transitioned from Eliquis to heparin gtt during admission due to valvular heart disease. Patient was also evaluated by cardiology for moderate mitral stenosis, CHF, and a. fib, was diuresed with IV Lasix, treated with metoprolol, and recommended for structural heart evaluation knowing that patient is a poor candidate for procedural intervention due to comorbidities. Patient was then transferred to [facility].       Allergies  No Known Allergies    	    MEDICATIONS:  aspirin enteric coated 81 milliGRAM(s) Oral daily  furosemide   Injectable 40 milliGRAM(s) IV Push daily  heparin   Injectable 5500 Unit(s) IV Push every 6 hours PRN  heparin   Injectable 2500 Unit(s) IV Push every 6 hours PRN  heparin  Infusion. 900 Unit(s)/Hr IV Continuous <Continuous>  metoprolol succinate ER 50 milliGRAM(s) Oral daily    cefTRIAXone   IVPB 1000 milliGRAM(s) IV Intermittent every 24 hours  remdesivir  IVPB   IV Intermittent   remdesivir  IVPB 100 milliGRAM(s) IV Intermittent every 24 hours    albuterol/ipratropium for Nebulization 3 milliLiter(s) Nebulizer every 6 hours PRN  budesonide 160 MICROgram(s)/formoterol 4.5 MICROgram(s) Inhaler 2 Puff(s) Inhalation two times a day  guaiFENesin Oral Liquid (Sugar-Free) 100 milliGRAM(s) Oral every 6 hours PRN  tiotropium 2.5 MICROgram(s) Inhaler 2 Puff(s) Inhalation daily      pantoprazole    Tablet 40 milliGRAM(s) Oral before breakfast  polyethylene glycol 3350 17 Gram(s) Oral two times a day  senna 2 Tablet(s) Oral at bedtime    allopurinol 100 milliGRAM(s) Oral daily  atorvastatin 40 milliGRAM(s) Oral at bedtime        PAST MEDICAL & SURGICAL HISTORY:  Afib      Congestive heart failure (CHF)      CVA (cerebral vascular accident)      Hypertension, unspecified type      Chronic obstructive pulmonary disease (COPD)      No significant past surgical history          FAMILY HISTORY:  HTN    SOCIAL HISTORY:    The patient is a nonsmoker       REVIEW OF SYSTEMS:  See HPI, otherwise complete 14 point review of systems negative        PHYSICAL EXAM:  T(C): 36.7 (02-21-24 @ 05:30), Max: 36.8 (02-20-24 @ 11:30)  HR: 76 (02-21-24 @ 05:30) (73 - 102)  BP: 151/75 (02-21-24 @ 05:30) (120/76 - 151/75)  RR: 18 (02-21-24 @ 05:30) (16 - 18)  SpO2: 100% (02-21-24 @ 05:30) (93% - 100%)  Wt(kg): --  I&O's Summary      Appearance: No Acute Distress; resting comfortably  HEENT:  Normal oral mucosa, PERRL, EOMI	  Cardiovascular: Normal S1 S2, No JVD   Respiratory: Normal respiratory effort; Decreased air entry to auscultation bilaterally  Gastrointestinal:  Soft, Non-tender, + BS	  Skin: No rashes, No ecchymoses, No cyanosis	  Neurologic: Non-focal; no weakness  Extremities: No clubbing, 1+ edema  Vascular: Peripheral pulses palpable 2+ bilaterally  Psychiatry:  Mood & affect appropriate    Laboratory Data:	 	    CBC Full  -  ( 21 Feb 2024 05:01 )  WBC Count : 8.63 K/uL  Hemoglobin : 9.7 g/dL  Hematocrit : 32.4 %  Platelet Count - Automated : 186 K/uL  Mean Cell Volume : 101.3 fL  Mean Cell Hemoglobin : 30.3 pg  Mean Cell Hemoglobin Concentration : 29.9 gm/dL  Auto Neutrophil # : 5.54 K/uL  Auto Lymphocyte # : 1.23 K/uL  Auto Monocyte # : 1.65 K/uL  Auto Eosinophil # : 0.01 K/uL  Auto Basophil # : 0.04 K/uL  Auto Neutrophil % : 64.1 %  Auto Lymphocyte % : 14.3 %  Auto Monocyte % : 19.1 %  Auto Eosinophil % : 0.1 %  Auto Basophil % : 0.5 %    02-21    142  |  95<L>  |  71<H>  ----------------------------<  113<H>  4.5   |  33<H>  |  1.78<H>  02-20    140  |  94<L>  |  55<H>  ----------------------------<  84  4.9   |  32<H>  |  1.58<H>    Ca    9.1      21 Feb 2024 05:01  Ca    9.6      20 Feb 2024 05:35  Phos  6.1     02-21  Phos  5.8     02-20  Mg     2.80     02-21  Mg     2.60     02-20        Interpretation of Telemetry: A-fib 80s	    ECG:  	A-fib       Assessment: 89 year old man with a-fib, HTN, prior CVA, and mitral stenosis presents with acute diastolic CHF and pleural effusion complicated by COVID-19.     Plan of Care:    #Acute diastolic CHF-  Respiratory status is improved s/p pleural drainage and diuresis.  Continue IV Lasix as ordered.  Echo reviewed.  No cardiac objection to VATS now that respiratory status is stabilized.       #A-fib-  Associated with mitral stenosis.  Rates are controlled on Lopressor.  Continue heparin gtt in anticipation of surgery.    #COVID-19-  Management as per the pulmonary team.       56 minutes spent on total encounter; more than 50% of the visit was spent counseling and/or coordinating care by the attending physician.   	  Rey Crowley MD [facility]  Cardiovascular Diseases  [phone]

## 2024-02-21 NOTE — PROGRESS NOTE ADULT - PROBLEM SELECTOR PLAN 4
- TTE 2/7 demonstrating EF 55-60%, moderate pulmonary hypertension, moderate mitral stenosis, severely enlarged b/l atria   - C/w Lasix 40 IV daily- hold while NPO  - Monitor I/Os

## 2024-02-22 NOTE — PROGRESS NOTE ADULT - PROBLEM SELECTOR PLAN 8
- Hx of AFib   - Monitor on telemetry (telemetry with AFib to 100s 2/19)  - C/w metoprolol succinate 50 mg qd   - C/w Heparin gtt for full anticoagulation pending procedure  - Keep Mg > 2 and K+ > 4

## 2024-02-22 NOTE — PROGRESS NOTE ADULT - ASSESSMENT
89M PMHx A. fib on Eliquis, CHF (EF 45-50%, combined systolic and diastolic), CVA, HTN, COPD, CKD3, multiple hospitalizations for CHF exacerbation in the prior months, left sided pleural effusion that was drained last hospitalization, admitted to [facility] on 2/6/2024 for CHF exacerbation and acute on chronic hypoxic respiratory failure 2/2 large left pleural effusion. Patient transferred to [facility] for thoracic surgery eval. Now c/b covid+, on Remdesevir, pending possible plrx placement

## 2024-02-22 NOTE — PROGRESS NOTE ADULT - SUBJECTIVE AND OBJECTIVE BOX
[facility] Division of Hospital Medicine  Lucina Lockwood MD  Available via MS Teams  Pager: [phone]    SUBJECTIVE / OVERNIGHT EVENTS:  brief desat today, back to 3LNC. pt continues with wet cough    MEDICATIONS  (STANDING):  allopurinol 100 milliGRAM(s) Oral daily  aspirin enteric coated 81 milliGRAM(s) Oral daily  atorvastatin 40 milliGRAM(s) Oral at bedtime  budesonide 160 MICROgram(s)/formoterol 4.5 MICROgram(s) Inhaler 2 Puff(s) Inhalation two times a day  cefTRIAXone   IVPB 1000 milliGRAM(s) IV Intermittent every 24 hours  guaiFENesin Oral Liquid (Sugar-Free) 100 milliGRAM(s) Oral every 6 hours  heparin  Infusion. 900 Unit(s)/Hr (9 mL/Hr) IV Continuous <Continuous>  metoprolol succinate ER 50 milliGRAM(s) Oral daily  pantoprazole    Tablet 40 milliGRAM(s) Oral before breakfast  polyethylene glycol 3350 17 Gram(s) Oral two times a day  remdesivir  IVPB   IV Intermittent   remdesivir  IVPB 100 milliGRAM(s) IV Intermittent every 24 hours  senna 2 Tablet(s) Oral at bedtime  tiotropium 2.5 MICROgram(s) Inhaler 2 Puff(s) Inhalation daily    MEDICATIONS  (PRN):  albuterol    90 MICROgram(s) HFA Inhaler 2 Puff(s) Inhalation every 6 hours PRN Shortness of Breath and/or Wheezing  heparin   Injectable 5500 Unit(s) IV Push every 6 hours PRN For aPTT less than 40  heparin   Injectable 2500 Unit(s) IV Push every 6 hours PRN For aPTT between 40 - 57      I&O's Summary    21 Feb 2024 07:01  -  22 Feb 2024 07:00  --------------------------------------------------------  IN: 584 mL / OUT: 575 mL / NET: 9 mL        PHYSICAL EXAM:  Vital Signs Last 24 Hrs  T(C): 36.6 (22 Feb 2024 13:15), Max: 36.6 (22 Feb 2024 13:15)  T(F): 97.8 (22 Feb 2024 13:15), Max: 97.8 (22 Feb 2024 13:15)  HR: 82 (22 Feb 2024 13:15) (82 - 87)  BP: 130/72 (22 Feb 2024 13:15) (130/72 - 139/70)  BP(mean): --  RR: 18 (22 Feb 2024 13:15) (18 - 18)  SpO2: 97% (22 Feb 2024 13:15) (79% - 100%)    Parameters below as of 22 Feb 2024 13:15  Patient On (Oxygen Delivery Method): nasal cannula  O2 Flow (L/min): 3    CONSTITUTIONAL: NAD  EYES: PERRLA; conjunctiva and sclera clear  ENMT: Moist oral mucosa, no pharyngeal injection or exudates  NECK: Supple, no palpable masses  RESPIRATORY: Normal respiratory effort; lungs are clear to auscultation bilaterally  CARDIOVASCULAR: Regular rate and rhythm, normal S1 and S2, no murmur/rub/gallop; No lower extremity edema; Peripheral pulses are 2+ bilaterally  ABDOMEN: Nontender to palpation, normoactive bowel sounds, no rebound/guarding  MUSCULOSKELETAL:  no clubbing or cyanosis of digits; no joint swelling or tenderness to palpation  PSYCH: A+O to person, place, and time; affect appropriate  NEUROLOGY: CN 2-12 are intact and symmetric; no gross sensory deficits   SKIN: No rashes; no palpable lesions    LABS:                        9.9    11.67 )-----------( 200      ( 22 Feb 2024 06:46 )             33.5     02-22    143  |  95<L>  |  78<H>  ----------------------------<  108<H>  4.3   |  34<H>  |  1.89<H>    Ca    9.2      22 Feb 2024 06:46  Phos  6.2     02-22  Mg     2.90     02-22      PTT - ( 22 Feb 2024 06:46 )  PTT:61.7 sec      Urinalysis Basic - ( 22 Feb 2024 06:46 )    Color: x / Appearance: x / SG: x / pH: x  Gluc: 108 mg/dL / Ketone: x  / Bili: x / Urobili: x   Blood: x / Protein: x / Nitrite: x   Leuk Esterase: x / RBC: x / WBC x   Sq Epi: x / Non Sq Epi: x / Bacteria: x        Culture - Sputum (collected 20 Feb 2024 19:10)  Source: .Sputum Sputum  Gram Stain (21 Feb 2024 07:06):    Few polymorphonuclear leukocytes per low power field    Few Squamous epithelial cells per low power field    Few Yeast like cells per oil power field    Moderate Gram Positive Cocci in Clusters per oil power field    Few Gram Positive Rods per oil power field    Few Gram Negative Rods per oil power field    Rare Gram Negative Diplococci per oil power field  Final Report (22 Feb 2024 07:08):    Normal Respiratory Shala present      SARS-CoV-2: Detected (19 Feb 2024 05:12)  SARS-CoV-2: NotDetec (06 Feb 2024 18:22)  SARS-CoV-2: NotDetec (18 Dec 2023 10:15)  SARS-CoV-2: NotDetec (22 Nov 2023 13:35)  SARS-CoV-2: NotDetec (15 Oct 2023 22:50)  SARS-CoV-2: NotDetec (18 Sep 2023 19:50)

## 2024-02-22 NOTE — PROGRESS NOTE ADULT - SUBJECTIVE AND OBJECTIVE BOX
Cardiovascular Disease Progress Note  Date of Service: 02-22-24 @ 09:10    Overnight events: No acute events overnight.    The patient reports generalized weakness.   Otherwise review of systems negative    Objective Findings:  T(C): 36.3 (02-22-24 @ 05:33), Max: 36.6 (02-21-24 @ 12:30)  HR: 87 (02-22-24 @ 05:33) (84 - 87)  BP: 131/74 (02-22-24 @ 05:33) (131/74 - 142/78)  RR: 18 (02-22-24 @ 05:33) (17 - 18)  SpO2: 99% (02-22-24 @ 05:33) (99% - 100%)  Wt(kg): --  Daily     Daily       Physical Exam:  Gen: NAD; Patient resting comfortably  HEENT: EOMI, Normocephalic/ atraumatic  CV: IIR, normal S1 + S2   Lungs:  Normal respiratory effort; decreased air entry to auscultation bilaterally  Abd: soft, non-tender; bowel sounds present  Ext: No edema; warm and well perfused    Telemetry: a-fib 80s    Laboratory Data:                        9.9    11.67 )-----------( 200      ( 22 Feb 2024 06:46 )             33.5     02-22    143  |  95<L>  |  78<H>  ----------------------------<  108<H>  4.3   |  34<H>  |  1.89<H>    Ca    9.2      22 Feb 2024 06:46  Phos  6.2     02-22  Mg     2.90     02-22      PTT - ( 22 Feb 2024 06:46 )  PTT:61.7 sec          Inpatient Medications:  MEDICATIONS  (STANDING):  allopurinol 100 milliGRAM(s) Oral daily  aspirin enteric coated 81 milliGRAM(s) Oral daily  atorvastatin 40 milliGRAM(s) Oral at bedtime  budesonide 160 MICROgram(s)/formoterol 4.5 MICROgram(s) Inhaler 2 Puff(s) Inhalation two times a day  cefTRIAXone   IVPB 1000 milliGRAM(s) IV Intermittent every 24 hours  heparin  Infusion. 900 Unit(s)/Hr (9 mL/Hr) IV Continuous <Continuous>  metoprolol succinate ER 50 milliGRAM(s) Oral daily  pantoprazole    Tablet 40 milliGRAM(s) Oral before breakfast  polyethylene glycol 3350 17 Gram(s) Oral two times a day  remdesivir  IVPB   IV Intermittent   remdesivir  IVPB 100 milliGRAM(s) IV Intermittent every 24 hours  senna 2 Tablet(s) Oral at bedtime  tiotropium 2.5 MICROgram(s) Inhaler 2 Puff(s) Inhalation daily      Assessment: 89 year old man with a-fib, HTN, prior CVA, and mitral stenosis presents with acute diastolic CHF and pleural effusion complicated by COVID-19.     Plan of Care:    #Acute diastolic CHF-  Respiratory status is improved s/p pleural drainage and diuresis.  Diuretics held for acute on chronic renal disease.   Echo reviewed.  No cardiac objection to VATS now that respiratory status is stabilized- now deferred due to COVID + status.       #A-fib-  Associated with moderate mitral stenosis.  Rates are controlled on Lopressor.  Dose warfarin if there is no plan for inpatient surgery.     #COVID-19-  Management as per the pulmonary team.          Over 55 minutes spent on total encounter; more than 50% of the visit was spent counseling and/or coordinating care by the attending physician.      Rey Crowley MD [facility]  Cardiovascular Disease  [phone]

## 2024-02-22 NOTE — PROGRESS NOTE ADULT - PROBLEM SELECTOR PLAN 1
- Patient admitted for acute hypoxic respiratory failure 2/2 recurrence of large left pleural effusion   - Patient also has CHF and COPD; also found to have COVID  - Currently on 2-3L NC  - CT chest 2/8 with lingular and left lower lobe groundglass infiltrates likely pneumonia. Small bilateral pleural effusions. Cardiomegaly. Stable prominent mediastinal lymph nodes  - CXR 2/16 demonstrating increase in the left pleural effusion now moderate to large with associated consolidation  - Thoracic surgery consulted - plan for plrx, cleared by cards 2/21, however CT surg now deferring due to covid positive  -will complete a course of CTX for consolidation on CXR, cough with sputum production (2/20-2/27)  -started Remdesevir for covid+, will complete 5 days  -- Wean O2 as tolerated- remains on 3L  -chest PT  -repeat CXR tomorrow

## 2024-02-23 NOTE — PROGRESS NOTE ADULT - PROBLEM SELECTOR PLAN 2
- s/p multiple thoracenteses with most recent thoracentesis resulting in removal of 1L of serosanguinous fluid   - Cytopathology negative for malignant cells; pleural fluid from 2/8 exudative by Light's criteria; pleural fluid Cx unremarkable   - Thoracic Surgery consult per Pulm recs - plan for possible plrx placement- now on hold due to covid+, possibel pigtail placement today, f.u CT surg recs

## 2024-02-23 NOTE — PROGRESS NOTE ADULT - PROBLEM SELECTOR PLAN 1
- Patient admitted for acute hypoxic respiratory failure 2/2 recurrence of large left pleural effusion   - Patient also has CHF and COPD; also found to have COVID  - Currently on 2-3L NC  - CT chest 2/8 with lingular and left lower lobe groundglass infiltrates likely pneumonia. Small bilateral pleural effusions. Cardiomegaly. Stable prominent mediastinal lymph nodes  - CXR 2/16 demonstrating increase in the left pleural effusion now moderate to large with associated consolidation  - Thoracic surgery consulted - plan initially for plrx, cleared by cards 2/21, CT surg called again 2/23- possible bedside pigtail catheter today, pending final recs  -will complete a course of CTX for consolidation on CXR, cough with sputum production (2/20-2/27)  -c/w Remdesevir for covid+, will complete 5 days  -- Wean O2 as tolerated- remains on 3L  -chest PT, hypersal nebs

## 2024-02-23 NOTE — PROGRESS NOTE ADULT - PROBLEM SELECTOR PLAN 4
creat 2.1 from Baseline Cr 1.5-1.7  -likely pre-renal, over diuresis, poor po intake  - continue to hold diuresis  -check urine electrolytes- if pre-renal will give gentle IVFs

## 2024-02-23 NOTE — CHART NOTE - NSCHARTNOTEFT_GEN_A_CORE
Reviewed CXR this am, complete white out of left lung, suspected from known pleural effusion. IRINEO on CKD, unable to continue diuresis. c/w chest PT, add hypersal nebs. Notified CT surgery, to eval. F.u CT surgery recs

## 2024-02-23 NOTE — PROGRESS NOTE ADULT - PROBLEM SELECTOR PLAN 8
- High risk of COPD contributing to acute hypoxic respiratory failure due to smoking history   - Casey q6h PRN   - C/w Symbicort and Spiriva

## 2024-02-23 NOTE — PROGRESS NOTE ADULT - SUBJECTIVE AND OBJECTIVE BOX
Cardiovascular Disease Progress Note  Date of Service: 02-23-24 @ 11:29    Overnight events: No acute events overnight.    The patient reports generalized weakness.   Otherwise review of systems negative    Objective Findings:  T(C): 36.3 (02-23-24 @ 05:15), Max: 36.6 (02-22-24 @ 13:15)  HR: 72 (02-23-24 @ 05:15) (72 - 82)  BP: 122/71 (02-23-24 @ 05:15) (122/71 - 132/60)  RR: 18 (02-23-24 @ 05:15) (18 - 18)  SpO2: 100% (02-23-24 @ 05:15) (79% - 100%)  Wt(kg): --  Daily     Daily       Physical Exam:  Gen: NAD; Patient resting comfortably  HEENT: EOMI, Normocephalic/ atraumatic  CV: IIR, normal S1 + S2, no m/r/g  Lungs:  Normal respiratory effort; decreased air entry to auscultation bilaterally  Abd: soft, non-tender; bowel sounds present  Ext: No edema; warm and well perfused    Telemetry: A-fib     Laboratory Data:                        9.7    10.29 )-----------( 188      ( 23 Feb 2024 04:00 )             31.9     02-23    140  |  95<L>  |  87<H>  ----------------------------<  122<H>  4.3   |  32<H>  |  2.17<H>    Ca    8.8      23 Feb 2024 04:00  Phos  5.6     02-23  Mg     3.00     02-23      PTT - ( 23 Feb 2024 04:00 )  PTT:80.9 sec          Inpatient Medications:  MEDICATIONS  (STANDING):  allopurinol 100 milliGRAM(s) Oral daily  aspirin enteric coated 81 milliGRAM(s) Oral daily  atorvastatin 40 milliGRAM(s) Oral at bedtime  budesonide 160 MICROgram(s)/formoterol 4.5 MICROgram(s) Inhaler 2 Puff(s) Inhalation two times a day  cefTRIAXone   IVPB 1000 milliGRAM(s) IV Intermittent every 24 hours  metoprolol succinate ER 50 milliGRAM(s) Oral daily  pantoprazole    Tablet 40 milliGRAM(s) Oral before breakfast  polyethylene glycol 3350 17 Gram(s) Oral two times a day  remdesivir  IVPB 100 milliGRAM(s) IV Intermittent every 24 hours  remdesivir  IVPB   IV Intermittent   senna 2 Tablet(s) Oral at bedtime  sodium chloride 3%  Inhalation 4 milliLiter(s) Inhalation every 12 hours  tiotropium 2.5 MICROgram(s) Inhaler 2 Puff(s) Inhalation daily      Assessment: 89 year old man with a-fib, HTN, prior CVA, and mitral stenosis presents with acute diastolic CHF and pleural effusion complicated by COVID-19.     Plan of Care:    #Acute diastolic CHF-  CXR shows progressive L pleural effusions  Diuretics held for acute on chronic renal disease.   Echo reviewed.  No cardiac objection to VATS.       #A-fib-  Associated with moderate mitral stenosis.  Rates are controlled on Lopressor.  AC on hold for thoracic intervention.     #COVID-19-  Management as per the pulmonary team.                Over 55 minutes spent on total encounter; more than 50% of the visit was spent counseling and/or coordinating care by the attending physician.      Rey Crowley MD [facility]  Cardiovascular Disease  [phone]

## 2024-02-23 NOTE — PROGRESS NOTE ADULT - ASSESSMENT
89M PMHx A. fib on Eliquis, CHF (EF 45-50%, combined systolic and diastolic), CVA, HTN, COPD, CKD3, multiple hospitalizations for CHF exacerbation in the prior months, left sided pleural effusion that was drained last hospitalization, admitted to [facility] on 2/6/2024 for CHF exacerbation and acute on chronic hypoxic respiratory failure 2/2 large left pleural effusion. Patient transferred to [facility] for thoracic surgery eval. Now c/b covid+, on Remdesevir, pending possible plrx placement

## 2024-02-23 NOTE — CHART NOTE - NSCHARTNOTEFT_GEN_A_CORE
89M with PMHx A. fib previously on Eliquis, CHF (EF 45-50%, combined systolic and diastolic), CVA, HTN, COPD, CKD3, multiple hospitalizations for CHF exacerbation in the prior months, left sided pleural effusion that was drained last hospitalization, admitted to [facility] on 2/6/2024 for CHF exacerbation and acute on chronic hypoxic respiratory failure 2/2 large left pleural effusion. TTE on 2/7 showed EF 55-60%, pulmonary hypertension, moderate mitral stenosis. He was evaluated by Pulmonary and had Thoracentesis on 2/8, 1 L bloody output drained.  Pulmonology recommended transfer to [facility] for Pleurex catheter and possible EUS for lymph node biopsy.  Patient placed on IV lasix for diuresis. Patient tested COVID+ on 2/19  Patient pending possible OR        PLAN  - Will hold plan for Pleurx catheter placement at this time since pt is COVID +  - CXR w/ large left pleural effusion and new SOB with O2 requirement  - s/p Left PTC placement today, 800cc pleural fluid drained, pleural fluid sent  - Pending post procedure placement CXR  - Obtain AM CXR and maintain PTC to [place]  - Can resume heparin gtt at [place] if no signs of bleeding  - Please call w/ any further concerns to thoracic #[id number]  - Plan above as per Dr Garza.

## 2024-02-23 NOTE — PROGRESS NOTE ADULT - SUBJECTIVE AND OBJECTIVE BOX
[facility] Division of Hospital Medicine  Lucina Lockwood MD  Available via MS Teams  Pager: [phone]    SUBJECTIVE / OVERNIGHT EVENTS:  remains with cough/SOB, does not feel better    MEDICATIONS  (STANDING):  allopurinol 100 milliGRAM(s) Oral daily  aspirin enteric coated 81 milliGRAM(s) Oral daily  atorvastatin 40 milliGRAM(s) Oral at bedtime  budesonide 160 MICROgram(s)/formoterol 4.5 MICROgram(s) Inhaler 2 Puff(s) Inhalation two times a day  cefTRIAXone   IVPB 1000 milliGRAM(s) IV Intermittent every 24 hours  metoprolol succinate ER 50 milliGRAM(s) Oral daily  pantoprazole    Tablet 40 milliGRAM(s) Oral before breakfast  polyethylene glycol 3350 17 Gram(s) Oral two times a day  remdesivir  IVPB 100 milliGRAM(s) IV Intermittent every 24 hours  remdesivir  IVPB   IV Intermittent   senna 2 Tablet(s) Oral at bedtime  sodium chloride 3%  Inhalation 4 milliLiter(s) Inhalation every 12 hours  tiotropium 2.5 MICROgram(s) Inhaler 2 Puff(s) Inhalation daily    MEDICATIONS  (PRN):  albuterol    90 MICROgram(s) HFA Inhaler 2 Puff(s) Inhalation every 6 hours PRN Shortness of Breath and/or Wheezing      I&O's Summary    22 Feb 2024 07:01  -  23 Feb 2024 07:00  --------------------------------------------------------  IN: 650 mL / OUT: 450 mL / NET: 200 mL        PHYSICAL EXAM:  Vital Signs Last 24 Hrs  T(C): 36.3 (23 Feb 2024 05:15), Max: 36.6 (22 Feb 2024 13:15)  T(F): 97.3 (23 Feb 2024 05:15), Max: 97.8 (22 Feb 2024 13:15)  HR: 72 (23 Feb 2024 05:15) (72 - 82)  BP: 122/71 (23 Feb 2024 05:15) (122/71 - 132/60)  BP(mean): --  RR: 18 (23 Feb 2024 05:15) (18 - 18)  SpO2: 100% (23 Feb 2024 05:15) (97% - 100%)    Parameters below as of 23 Feb 2024 05:15  Patient On (Oxygen Delivery Method): nasal cannula  O2 Flow (L/min): 3    CONSTITUTIONAL: NAD  EYES: PERRLA; conjunctiva and sclera clear  ENMT: Moist oral mucosa, no pharyngeal injection or exudates  NECK: Supple, no palpable masses  RESPIRATORY: Normal respiratory effort; decreased respiratory sounds on left lung  CARDIOVASCULAR: Regular rate and rhythm, normal S1 and S2, no murmur/rub/gallop; No lower extremity edema; Peripheral pulses are 2+ bilaterally  ABDOMEN: Nontender to palpation, normoactive bowel sounds, no rebound/guarding  MUSCULOSKELETAL:  no clubbing or cyanosis of digits; no joint swelling or tenderness to palpation  PSYCH: A+O to person, place, and time; affect appropriate  NEUROLOGY: CN 2-12 are intact and symmetric; no gross sensory deficits   SKIN: No rashes; no palpable lesions    LABS:                        9.7    10.29 )-----------( 188      ( 23 Feb 2024 04:00 )             31.9     02-23    140  |  95<L>  |  87<H>  ----------------------------<  122<H>  4.3   |  32<H>  |  2.17<H>    Ca    8.8      23 Feb 2024 04:00  Phos  5.6     02-23  Mg     3.00     02-23      PTT - ( 23 Feb 2024 04:00 )  PTT:80.9 sec      Urinalysis Basic - ( 23 Feb 2024 04:00 )    Color: x / Appearance: x / SG: x / pH: x  Gluc: 122 mg/dL / Ketone: x  / Bili: x / Urobili: x   Blood: x / Protein: x / Nitrite: x   Leuk Esterase: x / RBC: x / WBC x   Sq Epi: x / Non Sq Epi: x / Bacteria: x        Culture - Sputum (collected 20 Feb 2024 19:10)  Source: .Sputum Sputum  Gram Stain (21 Feb 2024 07:06):    Few polymorphonuclear leukocytes per low power field    Few Squamous epithelial cells per low power field    Few Yeast like cells per oil power field    Moderate Gram Positive Cocci in Clusters per oil power field    Few Gram Positive Rods per oil power field    Few Gram Negative Rods per oil power field    Rare Gram Negative Diplococci per oil power field  Final Report (22 Feb 2024 07:08):    Normal Respiratory Shala present      SARS-CoV-2: Detected (19 Feb 2024 05:12)  SARS-CoV-2: NotDetec (06 Feb 2024 18:22)  SARS-CoV-2: NotDetec (18 Dec 2023 10:15)  SARS-CoV-2: NotDetec (22 Nov 2023 13:35)  SARS-CoV-2: NotDetec (15 Oct 2023 22:50)  SARS-CoV-2: NotDetec (18 Sep 2023 19:50)      RADIOLOGY & ADDITIONAL TESTS:  New Results Reviewed Today:   CXR read by me 2/23: complete white of of left lung, pleural effusion, no pneumothorax

## 2024-02-24 NOTE — CHART NOTE - NSCHARTNOTEFT_GEN_A_CORE
Spoke to thoracic regarding overnight events. Per thoracic, noted CXR with small pneumothorax but likely trapped lung. Do not think this or the effusion are the source of his respiratory distress given large volume drainage yesterday. Recommending treating new PNA, which abx have already been escalated.

## 2024-02-24 NOTE — PROGRESS NOTE ADULT - SUBJECTIVE AND OBJECTIVE BOX
pt seen and examined multiple times today  on BiPAP  assisted w suctioning of oral secretions  family at bedside    Vital Signs Last 24 Hrs  T(C): 37.1 (24 Feb 2024 05:15), Max: 37.1 (24 Feb 2024 05:15)  T(F): 98.8 (24 Feb 2024 05:15), Max: 98.8 (24 Feb 2024 05:15)  HR: 90 (24 Feb 2024 05:15) (78 - 90)  BP: 121/70 (24 Feb 2024 05:15) (121/70 - 128/78)  BP(mean): --  RR: 20 (24 Feb 2024 10:41) (16 - 20)  SpO2: 99% (24 Feb 2024 10:41) (96% - 100%)    Parameters below as of 24 Feb 2024 10:41  Patient On (Oxygen Delivery Method): nasal cannula, high flow  O2 Flow (L/min): 60  O2 Concentration (%): 100      PE:  Constitutional: Awake, lethargic  Respiratory: on BiPAP  Cards: S1, S2  Gastrointestinal: Soft nontender, nondistended  Extremities: Moving all extremities, LE edema  L PTC on ws draining serosanguinous fluid, no air leak appreciated    labs and imaging reviewed      ASSESSMENT  89M with PMHx NANCY meza previously on Eliquis, CHF (EF 45-50%, combined systolic and diastolic), CVA, HTN, COPD, CKD3, multiple hospitalizations for CHF exacerbation in the prior months, left sided pleural effusion that was drained last hospitalization, admitted to [facility] on 2/6/2024 for CHF exacerbation and acute on chronic hypoxic respiratory failure 2/2 large left pleural effusion. TTE on 2/7 showed EF 55-60%, pulmonary hypertension, moderate mitral stenosis. He was evaluated by Pulmonary and had Thoracentesis on 2/8, 1 L bloody output drained.  Pulmonology recommended transfer to [facility] for Pleurex catheter and possible EUS for lymph node biopsy.  Patient placed on IV lasix for diuresis. Patient tested COVID+ on 2/19   L PTC placed yesterday 1.8L drained, new R sided PNA      PLAN  - Will hold plan for Pleurx catheter placement at this time since pt is COVID +  - s/p Left PTC placement continue tube on waterseal   - medical management of new R sided PNA  - Please call w/ any further concerns to thoracic #[id number]

## 2024-02-24 NOTE — CONSULT NOTE ADULT - ASSESSMENT
89M with PMHx A. fib previously on Eliquis, CHF (EF 45-50%, combined systolic and diastolic), CVA, HTN, COPD, CKD3, multiple hospitalizations for CHF exacerbation in the prior months, left sided pleural effusion that was drained last hospitalization, admitted to [facility] on 2/6/2024 for CHF exacerbation and acute on chronic hypoxic respiratory failure 2/2 large left pleural effusion. TTE on 2/7 showed EF 55-60%, pulmonary hypertension, moderate mitral stenosis. He was evaluated by Pulmonary and had Thoracentesis on 2/8, 1 L bloody output drained.  transfer to [facility] for Pleurex catheter and possible EUS for lymph node biopsy.  Patient placed on IV lasix for diuresis. Patient tested COVID+ on 2/19   L PTC placed yesterday 1.8L drained, new R sided PNA    #recommendations  patient with worsening hypoxemia in the setting of increased sputum, and leukocytosis with new Right sided PNA and mucus plugging   s/p left chest tube to waterseal with likely non reexpandable lung  with some minor hypercapnia on VBG repeat ABG in the 2-4 hours  continue hfnc and descalate as tolerated  s/p ceftriaxone, remedesivir.  complete course of zosyn  MRSA swab and   continue decadron for covid  airway clearance: humidified oxygen, standing guaifenesin, duonebs q6h, hyper sal q6h, chest PT q6 h, aerobika/acapella q6 h, deep NT suction q6 hr, chest vest q12h  diurese as tolerated  titrate oxygen to O2 >88%, use humidified oxygen  incentive spirometry, OOB to chair, PT/OT  aspiration precautions as necessary  DVT ppx as tolerated  airway clearance as necessary 89M with PMHx A. fib previously on Eliquis, CHF (EF 45-50%, combined systolic and diastolic), CVA, HTN, COPD, CKD3, multiple hospitalizations for CHF exacerbation in the prior months, left sided pleural effusion that was drained last hospitalization, admitted to [facility] on 2/6/2024 for CHF exacerbation and acute on chronic hypoxic respiratory failure 2/2 large left pleural effusion. TTE on 2/7 showed EF 55-60%, pulmonary hypertension, moderate mitral stenosis. He was evaluated by Pulmonary and had Thoracentesis on 2/8, 1 L bloody output drained.  transfer to [facility] for Pleurex catheter and possible EUS for lymph node biopsy.  Patient placed on IV lasix for diuresis. Patient tested COVID+ on 2/19   L PTC placed yesterday 1.8L drained, new R sided PNA    #recommendations  patient with worsening hypoxemia in the setting of increased sputum, and leukocytosis with new Right sided PNA and mucus plugging   s/p left chest tube to waterseal with likely non reexpandable lung  CTS to manage chest tube and exudative serosang but cyto neg pleural effusion  with some minor hypercapnia on VBG repeat ABG in the 2-4 hours  continue hfnc and descalate as tolerated  s/p ceftriaxone, remedesivir.  complete course of zosyn  MRSA swab and   continue decadron for covid  airway clearance: humidified oxygen, standing guaifenesin, duonebs q6h, hyper sal q6h, chest PT q6 h, aerobika/acapella q6 h, deep NT suction q6 hr, chest vest q12h  diurese as tolerated  titrate oxygen to O2 >88%, use humidified oxygen  incentive spirometry, OOB to chair, PT/OT  aspiration precautions as necessary  DVT ppx as tolerated  airway clearance as necessary

## 2024-02-24 NOTE — RAPID RESPONSE TEAM SUMMARY - NSSITUATIONBACKGROUNDRRT_GEN_ALL_CORE
Briefly, 89M PMHx A. fib on Eliquis, CHF (EF 45-50%, combined systolic and diastolic), CVA, HTN, COPD, CKD3, multiple hospitalizations for CHF exacerbation in the prior months, left sided pleural effusion that was drained last hospitalization, admitted to [facility] on 2/6/2024 for CHF exacerbation and acute on chronic hypoxic respiratory failure 2/2 large left pleural effusion. Patient transferred to [facility] for thoracic surgery eval. Now c/b covid+, on Remdesevir, s/p chest tube placement 2/23.    RRT called for hypoxia. On arrival, patient donning NC and NRB and satting 85% and tachypneic. Rectal temp 98.6, . Exam notable for AOx2, JVD, tachycardia, decreased lung sounds, and mild BLE pitting. Per primary RN, patient had 1.9L output from chest tube. STAT CXR ordered that appeared to have patchy opacities not previously Briefly, 89M PMHx A. fib on Eliquis, CHF (EF 45-50%, combined systolic and diastolic), CVA, HTN, COPD, CKD3, multiple hospitalizations for CHF exacerbation in the prior months, left sided pleural effusion that was drained last hospitalization, admitted to [facility] on 2/6/2024 for CHF exacerbation and acute on chronic hypoxic respiratory failure 2/2 large left pleural effusion. Patient transferred to [facility] for thoracic surgery eval. Now c/b covid+, on Remdesevir, s/p chest tube placement 2/23.    RRT called for hypoxia. On arrival, patient donning NC and NRB and satting 85% and tachypneic. Rectal temp 98.6, . Exam notable for AOx2, JVD, tachycardia, decreased lung sounds, and mild BLE pitting. Per primary RN, patient had 1.9L output from chest tube. RN also noted patient had dysphagia. Patient on remdesivir for COVID; was treated with 1 day of decadron initially then stopped as there O2 requirements believed to be not new. STAT CXR ordered that appeared to have patchy opacities not previously on CXR. Patient given IV Lasix 20 mg and suctioned with copious output. O2 improved to mid 90s after. Patient also started on BiPAP 10/5 and patient sustained in high 90s. Suspect respiratory status combination of CHF exacerbation with likely mucous plug/possible aspiration. Recommend c/w BiPAP for now, intermittent suctioning or Acapella if patient is able, chest PT, duonebs q6. Also recommend clarifying O2 requirements as he may benefit from further decadron for COVID. Discussed with primary team at bedside.

## 2024-02-24 NOTE — PROGRESS NOTE ADULT - ASSESSMENT
89M PMHx A. fib on Eliquis, CHF (EF 45-50%, combined systolic and diastolic), CVA, HTN, COPD, CKD3, multiple hospitalizations for CHF exacerbation in the prior months, left sided pleural effusion that was drained last hospitalization, admitted to [facility] on 2/6/2024 for CHF exacerbation and acute on chronic hypoxic respiratory failure 2/2 large left pleural effusion. Patient transferred to [facility] for thoracic surgery eval. Now c/b covid+, on Remdesevir. s/p pigtail by CT surg 2/23. Worsening respiratory status 2/24 now on Bipap

## 2024-02-24 NOTE — PROGRESS NOTE ADULT - PROBLEM SELECTOR PLAN 1
- Patient admitted for acute hypoxic respiratory failure 2/2 recurrence of large left pleural effusion   - CT chest 2/8 with lingular and left lower lobe groundglass infiltrates likely pneumonia. Small bilateral pleural effusions. Cardiomegaly. Stable prominent mediastinal lymph nodes  - CXR 2/16 demonstrating increase in the left pleural effusion now moderate to large with associated consolidation  - Thoracic surgery consulted - s/p bedside PTC placement 2/24- 2L of output today  -was on CTX since 2/20- new RML/RLL opacity on x-ray 2/23- RRT this am with hypoxemia- escalate to Zosyn  -c/w Remdesevir for covid+, will complete 5 days  -start dexamethasone for covid  -chest PT, hypersal nebs  -VBG with ph 7.31/72: start Bipap  -Pulmonary consulted, appreciate recs

## 2024-02-24 NOTE — CONSULT NOTE ADULT - SUBJECTIVE AND OBJECTIVE BOX
pleural effusion recurrent, Exudative and serosanguinous with Repeat cytology negative for malignant cells       CHIEF COMPLAINT:    HPI:  HPI:  89M PMHx NANCY meza previously on Eliquis, CHF (EF 45-50%, combined systolic and diastolic), CVA, HTN, COPD, CKD3, multiple hospitalizations for CHF exacerbation in the prior months, left sided pleural effusion that was drained last hospitalization, admitted to [facility] on 2/6/2024 for CHF exacerbation and acute on chronic hypoxic respiratory failure 2/2 large left pleural effusion. TTE on 2/7 showed EF 55-60%, pulmonary hypertension, moderate mitral stenosis. He was evaluated by Pulmonary and had Thoracentesis on 2/8, 1 L bloody output drained.  Pulmonology recommended transfer to [facility] for Pleurex catheter and possible EUS for lymph node biopsy.  Patient placed on IV lasix for diuresis.  Pt was also found with 2.5 x 1.4 cm right retroareolar breast nodule larger than 9/19/2023.   Surgery was consulted and recommended outpatient follow-up. Patient also found with LLE blister, which is chronic.  Pt also with IRINEO 2/2 CKD3 on this admission, with Hypernatremia and Hypokalemia, K was repleted. Patient transitioned from Eliquis to heparin gtt during admission due to valvular heart disease. Patient was also evaluated by cardiology for moderate mitral stenosis, CHF, and a. fib, was diuresed with IV Lasix, treated with metoprolol, and recommended for structural heart evaluation knowing that patient is a poor candidate for procedural intervention due to comorbidities. Patient was then transferred to [facility].   Of note, patient was recently admitted in 1/2024 presenting with concern for LE blisters, found to have acute on chronic CHF, large exudative pleural effusion s/p 1L removal, transferred from  for thoracic surgery evaluation and was found to be a poor surgical candidate. IR was consulted and recommended that patient had no indication for a pigtail catheter placement due to resolution of dyspnea. Patient was then discharged.   On interview, patient denies chest pain, abdominal pain, reports difficulty urinating, and reports breathing is manageable. Patient endorses productive cough. Patient reports being willing to undergo thoracentesis or PleurX catheter.  (16 Feb 2024 05:06)    since transfer with worsening productive cough and hypoxemia, requiring HFNC and BIPAP. s/p left sided chest tube with non reexpandable lung. Patient denies fevers, chills, chest pain,nausea, abdominal pain, diarrhea, constipation, dysuria, leg swelling, headache, light headedness    PAST MEDICAL & SURGICAL HISTORY:  Afib      Congestive heart failure (CHF)      CVA (cerebral vascular accident)      Hypertension, unspecified type      Chronic obstructive pulmonary disease (COPD)      No significant past surgical history          FAMILY HISTORY:      SOCIAL HISTORY:  former smoker    Allergies    No Known Allergies    Intolerances        HOME MEDICATIONS:  Home Medications:  acetaminophen 325 mg oral tablet: 2 tab(s) orally every 6 hours As needed Mild Pain (1 - 3) (16 Feb 2024 04:13)  albuterol 90 mcg/inh inhalation aerosol: 2 puff(s) inhaled every 6 hours As needed Shortness of Breath and/or Wheezing (16 Feb 2024 04:13)  allopurinol 100 mg oral tablet: 1 tab(s) orally once a day (16 Feb 2024 04:13)  aluminum hydroxide-magnesium hydroxide 200 mg-200 mg/5 mL oral suspension: 30 milliliter(s) orally every 4 hours As needed Dyspepsia (16 Feb 2024 04:13)  apixaban 2.5 mg oral tablet: 1 tab(s) orally every 12 hours (16 Feb 2024 04:13)  aspirin 81 mg oral delayed release tablet: 1 tab(s) orally once a day (16 Feb 2024 04:13)  atorvastatin 40 mg oral tablet: 1 tab(s) orally once a day (at bedtime) (16 Feb 2024 04:13)  budesonide-formoterol 160 mcg-4.5 mcg/inh inhalation aerosol: 2 puff(s) inhaled 2 times a day (16 Feb 2024 04:13)  furosemide 100 mg/100 mL-0.9% intravenous solution: 40 milliliter(s) intravenous once a day (16 Feb 2024 04:13)  melatonin 3 mg oral tablet: 1 tab(s) orally once a day (at bedtime) As needed Insomnia (16 Feb 2024 04:13)  metoprolol succinate 50 mg oral tablet, extended release: 1 tab(s) orally once a day (16 Feb 2024 04:13)  pantoprazole 40 mg oral delayed release tablet: 1 tab(s) orally once a day (before a meal) (16 Feb 2024 04:13)  petrolatum topical ointment: 1 Apply topically to affected area 3 times a day (16 Feb 2024 04:13)  polyethylene glycol 3350 oral powder for reconstitution: 17 gram(s) orally 2 times a day (16 Feb 2024 04:13)  senna leaf extract oral tablet: 2 tab(s) orally once a day (at bedtime) (16 Feb 2024 04:13)  tiotropium 2.5 mcg/inh inhalation aerosol: 2 puff(s) inhaled once a day (16 Feb 2024 04:13)      REVIEW OF SYSTEMS:  see hpi    OBJECTIVE:  ICU Vital Signs Last 24 Hrs  T(C): 36.7 (24 Feb 2024 13:45), Max: 37.1 (24 Feb 2024 05:15)  T(F): 98 (24 Feb 2024 13:45), Max: 98.8 (24 Feb 2024 05:15)  HR: 92 (24 Feb 2024 13:45) (78 - 92)  BP: 139/66 (24 Feb 2024 13:45) (121/70 - 139/66)  BP(mean): --  ABP: --  ABP(mean): --  RR: 18 (24 Feb 2024 13:45) (16 - 20)  SpO2: 95% (24 Feb 2024 13:45) (95% - 100%)    O2 Parameters below as of 24 Feb 2024 13:45  Patient On (Oxygen Delivery Method): nasal cannula, high flow  O2 Flow (L/min): 60  O2 Concentration (%): 100          02-24 @ 07:01  -  02-24 @ 14:33  --------------------------------------------------------  IN: 0 mL / OUT: 80 mL / NET: -80 mL      CAPILLARY BLOOD GLUCOSE      POCT Blood Glucose.: 118 mg/dL (24 Feb 2024 05:52)      PHYSICAL EXAM:  General: tired  HEENT: NC/AT, EOMI b/l, conjunctiva normal, MMM  Lymph Nodes: no cervical LAD  Neck: supple. full range of motion  Respiratory rhonchorus  Cardiovascular: S1 S2 present, RRR, no m/r/g  Abdomen: soft, NT/ND, +BS  Extremities: no c/c. bilateral pitting edema  Skin: no rashes or lesions noted  Neurological: AAOx3, no focal deficits  Psychiatry: calm, cooperative    LINES:     HOSPITAL MEDICATIONS:  Standing Meds:  acetaminophen   IVPB .. 1000 milliGRAM(s) IV Intermittent once  allopurinol 100 milliGRAM(s) Oral daily  aspirin enteric coated 81 milliGRAM(s) Oral daily  atorvastatin 40 milliGRAM(s) Oral at bedtime  budesonide 160 MICROgram(s)/formoterol 4.5 MICROgram(s) Inhaler 2 Puff(s) Inhalation two times a day  dexAMETHasone  Injectable 6 milliGRAM(s) IV Push daily  metoprolol succinate ER 50 milliGRAM(s) Oral daily  pantoprazole    Tablet 40 milliGRAM(s) Oral before breakfast  piperacillin/tazobactam IVPB.. 3.375 Gram(s) IV Intermittent every 12 hours  polyethylene glycol 3350 17 Gram(s) Oral two times a day  senna 2 Tablet(s) Oral at bedtime  sodium chloride 3%  Inhalation 4 milliLiter(s) Inhalation every 12 hours  tiotropium 2.5 MICROgram(s) Inhaler 2 Puff(s) Inhalation daily      PRN Meds:  acetaminophen     Tablet .. 650 milliGRAM(s) Oral every 4 hours PRN  albuterol    90 MICROgram(s) HFA Inhaler 2 Puff(s) Inhalation every 6 hours PRN      LABS:                        11.6   13.36 )-----------( 241      ( 24 Feb 2024 06:57 )             37.1     Hgb Trend: 11.6<--, 9.7<--, 9.9<--, 9.7<--, 10.9<--  02-24    142  |  96<L>  |  91<H>  ----------------------------<  120<H>  4.1   |  31  |  2.30<H>    Ca    9.4      24 Feb 2024 06:57  Phos  5.4     02-24  Mg     2.90     02-24      Creatinine Trend: 2.30<--, 2.17<--, 1.89<--, 1.78<--, 1.58<--, 1.52<--  PTT - ( 23 Feb 2024 04:00 )  PTT:80.9 sec  Urinalysis Basic - ( 24 Feb 2024 06:57 )    Color: x / Appearance: x / SG: x / pH: x  Gluc: 120 mg/dL / Ketone: x  / Bili: x / Urobili: x   Blood: x / Protein: x / Nitrite: x   Leuk Esterase: x / RBC: x / WBC x   Sq Epi: x / Non Sq Epi: x / Bacteria: x        Venous Blood Gas:  02-24 @ 09:24  7.31/72/45/36/73.5  VBG Lactate: 2.1      MICROBIOLOGY:     Culture - Fungal, Body Fluid (collected 23 Feb 2024 15:30)  Source: Pleural Fl Pleural Fluid  Preliminary Report (24 Feb 2024 07:47):    Testing in progress    Culture - Body Fluid with Gram Stain (collected 23 Feb 2024 15:30)  Source: Pleural Fl Pleural Fluid  Gram Stain (23 Feb 2024 23:24):    No polymorphonuclear leukocytes per low power field    No organisms seen per oil power field        RADIOLOGY:  [ ] Reviewed and interpreted by me    PULMONARY FUNCTION TESTS:    EKG: [facility] COMPLAINT:    HPI:  89M PMHx NANCY meza previously on Eliquis, CHF (EF 45-50%, combined systolic and diastolic), CVA, HTN, COPD, CKD3, multiple hospitalizations for CHF exacerbation in the prior months, left sided pleural effusion that was drained last hospitalization, admitted to [facility] on 2/6/2024 for CHF exacerbation and acute on chronic hypoxic respiratory failure 2/2 large left pleural effusion. TTE on 2/7 showed EF 55-60%, pulmonary hypertension, moderate mitral stenosis. He was evaluated by Pulmonary and had Thoracentesis on 2/8, 1 L bloody output drained.  Pulmonology recommended transfer to [facility] for Pleurex catheter and possible EUS for lymph node biopsy.  Patient placed on IV lasix for diuresis.  Pt was also found with 2.5 x 1.4 cm right retroareolar breast nodule larger than 9/19/2023.   Surgery was consulted and recommended outpatient follow-up. Patient also found with LLE blister, which is chronic.  Pt also with IRINEO 2/2 CKD3 on this admission, with Hypernatremia and Hypokalemia, K was repleted. Patient transitioned from Eliquis to heparin gtt during admission due to valvular heart disease. Patient was also evaluated by cardiology for moderate mitral stenosis, CHF, and a. fib, was diuresed with IV Lasix, treated with metoprolol, and recommended for structural heart evaluation knowing that patient is a poor candidate for procedural intervention due to comorbidities. Patient was then transferred to [facility].   Of note, patient was recently admitted in 1/2024 presenting with concern for LE blisters, found to have acute on chronic CHF, large exudative pleural effusion s/p 1L removal, transferred from  for thoracic surgery evaluation and was found to be a poor surgical candidate. IR was consulted and recommended that patient had no indication for a pigtail catheter placement due to resolution of dyspnea. Patient was then discharged.   On interview, patient denies chest pain, abdominal pain, reports difficulty urinating, and reports breathing is manageable. Patient endorses productive cough. Patient reports being willing to undergo thoracentesis or PleurX catheter.  (16 Feb 2024 05:06)    since transfer with worsening productive cough and hypoxemia, requiring HFNC and BIPAP. s/p left sided chest tube with non reexpandable lung. Patient denies fevers, chills, chest pain,nausea, abdominal pain, diarrhea, constipation, dysuria, leg swelling, headache, light headedness    PAST MEDICAL & SURGICAL HISTORY:  Afib      Congestive heart failure (CHF)      CVA (cerebral vascular accident)      Hypertension, unspecified type      Chronic obstructive pulmonary disease (COPD)      No significant past surgical history          FAMILY HISTORY:      SOCIAL HISTORY:  former smoker    Allergies    No Known Allergies    Intolerances        HOME MEDICATIONS:  Home Medications:  acetaminophen 325 mg oral tablet: 2 tab(s) orally every 6 hours As needed Mild Pain (1 - 3) (16 Feb 2024 04:13)  albuterol 90 mcg/inh inhalation aerosol: 2 puff(s) inhaled every 6 hours As needed Shortness of Breath and/or Wheezing (16 Feb 2024 04:13)  allopurinol 100 mg oral tablet: 1 tab(s) orally once a day (16 Feb 2024 04:13)  aluminum hydroxide-magnesium hydroxide 200 mg-200 mg/5 mL oral suspension: 30 milliliter(s) orally every 4 hours As needed Dyspepsia (16 Feb 2024 04:13)  apixaban 2.5 mg oral tablet: 1 tab(s) orally every 12 hours (16 Feb 2024 04:13)  aspirin 81 mg oral delayed release tablet: 1 tab(s) orally once a day (16 Feb 2024 04:13)  atorvastatin 40 mg oral tablet: 1 tab(s) orally once a day (at bedtime) (16 Feb 2024 04:13)  budesonide-formoterol 160 mcg-4.5 mcg/inh inhalation aerosol: 2 puff(s) inhaled 2 times a day (16 Feb 2024 04:13)  furosemide 100 mg/100 mL-0.9% intravenous solution: 40 milliliter(s) intravenous once a day (16 Feb 2024 04:13)  melatonin 3 mg oral tablet: 1 tab(s) orally once a day (at bedtime) As needed Insomnia (16 Feb 2024 04:13)  metoprolol succinate 50 mg oral tablet, extended release: 1 tab(s) orally once a day (16 Feb 2024 04:13)  pantoprazole 40 mg oral delayed release tablet: 1 tab(s) orally once a day (before a meal) (16 Feb 2024 04:13)  petrolatum topical ointment: 1 Apply topically to affected area 3 times a day (16 Feb 2024 04:13)  polyethylene glycol 3350 oral powder for reconstitution: 17 gram(s) orally 2 times a day (16 Feb 2024 04:13)  senna leaf extract oral tablet: 2 tab(s) orally once a day (at bedtime) (16 Feb 2024 04:13)  tiotropium 2.5 mcg/inh inhalation aerosol: 2 puff(s) inhaled once a day (16 Feb 2024 04:13)      REVIEW OF SYSTEMS:  see hpi    OBJECTIVE:  ICU Vital Signs Last 24 Hrs  T(C): 36.7 (24 Feb 2024 13:45), Max: 37.1 (24 Feb 2024 05:15)  T(F): 98 (24 Feb 2024 13:45), Max: 98.8 (24 Feb 2024 05:15)  HR: 92 (24 Feb 2024 13:45) (78 - 92)  BP: 139/66 (24 Feb 2024 13:45) (121/70 - 139/66)  BP(mean): --  ABP: --  ABP(mean): --  RR: 18 (24 Feb 2024 13:45) (16 - 20)  SpO2: 95% (24 Feb 2024 13:45) (95% - 100%)    O2 Parameters below as of 24 Feb 2024 13:45  Patient On (Oxygen Delivery Method): nasal cannula, high flow  O2 Flow (L/min): 60  O2 Concentration (%): 100          02-24 @ 07:01  -  02-24 @ 14:33  --------------------------------------------------------  IN: 0 mL / OUT: 80 mL / NET: -80 mL      CAPILLARY BLOOD GLUCOSE      POCT Blood Glucose.: 118 mg/dL (24 Feb 2024 05:52)      PHYSICAL EXAM:  General: tired  HEENT: NC/AT, EOMI b/l, conjunctiva normal, MMM  Lymph Nodes: no cervical LAD  Neck: supple. full range of motion  Respiratory rhonchorus  Cardiovascular: S1 S2 present, RRR, no m/r/g  Abdomen: soft, NT/ND, +BS  Extremities: no c/c. bilateral pitting edema  Skin: no rashes or lesions noted  Neurological: AAOx3, no focal deficits  Psychiatry: calm, cooperative    LINES:     HOSPITAL MEDICATIONS:  Standing Meds:  acetaminophen   IVPB .. 1000 milliGRAM(s) IV Intermittent once  allopurinol 100 milliGRAM(s) Oral daily  aspirin enteric coated 81 milliGRAM(s) Oral daily  atorvastatin 40 milliGRAM(s) Oral at bedtime  budesonide 160 MICROgram(s)/formoterol 4.5 MICROgram(s) Inhaler 2 Puff(s) Inhalation two times a day  dexAMETHasone  Injectable 6 milliGRAM(s) IV Push daily  metoprolol succinate ER 50 milliGRAM(s) Oral daily  pantoprazole    Tablet 40 milliGRAM(s) Oral before breakfast  piperacillin/tazobactam IVPB.. 3.375 Gram(s) IV Intermittent every 12 hours  polyethylene glycol 3350 17 Gram(s) Oral two times a day  senna 2 Tablet(s) Oral at bedtime  sodium chloride 3%  Inhalation 4 milliLiter(s) Inhalation every 12 hours  tiotropium 2.5 MICROgram(s) Inhaler 2 Puff(s) Inhalation daily      PRN Meds:  acetaminophen     Tablet .. 650 milliGRAM(s) Oral every 4 hours PRN  albuterol    90 MICROgram(s) HFA Inhaler 2 Puff(s) Inhalation every 6 hours PRN      LABS:                        11.6   13.36 )-----------( 241      ( 24 Feb 2024 06:57 )             37.1     Hgb Trend: 11.6<--, 9.7<--, 9.9<--, 9.7<--, 10.9<--  02-24    142  |  96<L>  |  91<H>  ----------------------------<  120<H>  4.1   |  31  |  2.30<H>    Ca    9.4      24 Feb 2024 06:57  Phos  5.4     02-24  Mg     2.90     02-24      Creatinine Trend: 2.30<--, 2.17<--, 1.89<--, 1.78<--, 1.58<--, 1.52<--  PTT - ( 23 Feb 2024 04:00 )  PTT:80.9 sec  Urinalysis Basic - ( 24 Feb 2024 06:57 )    Color: x / Appearance: x / SG: x / pH: x  Gluc: 120 mg/dL / Ketone: x  / Bili: x / Urobili: x   Blood: x / Protein: x / Nitrite: x   Leuk Esterase: x / RBC: x / WBC x   Sq Epi: x / Non Sq Epi: x / Bacteria: x        Venous Blood Gas:  02-24 @ 09:24  7.31/72/45/36/73.5  VBG Lactate: 2.1      MICROBIOLOGY:     Culture - Fungal, Body Fluid (collected 23 Feb 2024 15:30)  Source: Pleural Fl Pleural Fluid  Preliminary Report (24 Feb 2024 07:47):    Testing in progress    Culture - Body Fluid with Gram Stain (collected 23 Feb 2024 15:30)  Source: Pleural Fl Pleural Fluid  Gram Stain (23 Feb 2024 23:24):    No polymorphonuclear leukocytes per low power field    No organisms seen per oil power field        RADIOLOGY:  [ ] Reviewed and interpreted by me    PULMONARY FUNCTION TESTS:    EKG:

## 2024-02-24 NOTE — PROGRESS NOTE ADULT - SUBJECTIVE AND OBJECTIVE BOX
Cardiovascular Disease Progress Note  Date of Service: 02-24-24 @ 11:17    Overnight events: Hypoxia noted overnight.    The patient is in no distress on HFNC.        Objective Findings:  T(C): 37.1 (02-24-24 @ 05:15), Max: 37.1 (02-24-24 @ 05:15)  HR: 90 (02-24-24 @ 05:15) (77 - 90)  BP: 121/70 (02-24-24 @ 05:15) (100/67 - 128/78)  RR: 20 (02-24-24 @ 10:41) (16 - 20)  SpO2: 99% (02-24-24 @ 10:41) (96% - 100%)  Wt(kg): --  Daily     Daily       Physical Exam:  Gen: NAD; Patient resting comfortably  HEENT: EOMI, Normocephalic/ atraumatic  CV: IIR, normal S1 + S2, no m/r/g  Lungs:  Normal respiratory effort; decreased air entry to auscultation bilaterally  Abd: soft, non-tender; bowel sounds present  Ext: No edema; warm and well perfused    Telemetry: A-fib; PVCs    Laboratory Data:                        11.6   13.36 )-----------( 241      ( 24 Feb 2024 06:57 )             37.1     02-24    142  |  96<L>  |  91<H>  ----------------------------<  120<H>  4.1   |  31  |  2.30<H>    Ca    9.4      24 Feb 2024 06:57  Phos  5.4     02-24  Mg     2.90     02-24      PTT - ( 23 Feb 2024 04:00 )  PTT:80.9 sec          Inpatient Medications:  MEDICATIONS  (STANDING):  acetaminophen   IVPB .. 1000 milliGRAM(s) IV Intermittent once  allopurinol 100 milliGRAM(s) Oral daily  aspirin enteric coated 81 milliGRAM(s) Oral daily  atorvastatin 40 milliGRAM(s) Oral at bedtime  budesonide 160 MICROgram(s)/formoterol 4.5 MICROgram(s) Inhaler 2 Puff(s) Inhalation two times a day  dexAMETHasone  Injectable 6 milliGRAM(s) IV Push daily  metoprolol succinate ER 50 milliGRAM(s) Oral daily  pantoprazole    Tablet 40 milliGRAM(s) Oral before breakfast  piperacillin/tazobactam IVPB.. 3.375 Gram(s) IV Intermittent every 12 hours  polyethylene glycol 3350 17 Gram(s) Oral two times a day  remdesivir  IVPB 100 milliGRAM(s) IV Intermittent every 24 hours  remdesivir  IVPB   IV Intermittent   senna 2 Tablet(s) Oral at bedtime  sodium chloride 3%  Inhalation 4 milliLiter(s) Inhalation every 12 hours  tiotropium 2.5 MICROgram(s) Inhaler 2 Puff(s) Inhalation daily      Assessment: 89 year old man with a-fib, HTN, prior CVA, and mitral stenosis presents with acute diastolic CHF and pleural effusion complicated by COVID-19.     Plan of Care:    #Acute diastolic CHF-  Associated with progressive hypoxic respiratory failure now on HFNC  CXR shows progressive L pleural effusions- s/p chest tube 2/23  Diuretics held for acute on chronic renal disease.   Echo reviewed.  Poor overall prognosis.       #A-fib-  Associated with moderate mitral stenosis.  Rates are controlled on Lopressor.  AC on hold given recent thoracic intervention.     #COVID-19-  Management as per the pulmonary team.            Over 55 minutes spent on total encounter; more than 50% of the visit was spent counseling and/or coordinating care by the attending physician.      Rey Crowley MD [facility]  Cardiovascular Disease  [phone]

## 2024-02-24 NOTE — GOALS OF CARE CONVERSATION - ADVANCED CARE PLANNING - CONVERSATION DETAILS
Discussed with patient at bedside in regards code status, pt not sure, deferred to daughter. Discussed with daughter Frida over the phone in regards worsening respiratory status, on Bipap, new infiltrates on RML on CXR despite IV Abx, Remdesevir, pigtail placement to drain effusion. Explained despite all interventions patient seems to be worsening. We discussed code status. Frida states she understands aggressive measures might not safe his live, but she understands patient would have wanted everything to attempt prolong live. She states other family members as patient's partner, wish everything to be done.     Pt full code

## 2024-02-24 NOTE — PROGRESS NOTE ADULT - NSPROGADDITIONALINFOA_GEN_ALL_CORE
Poor prognosis, worsening respiratory status despite all interventions  Daughter notified 2/24
daughter updated over the phone 2/21

## 2024-02-24 NOTE — PROGRESS NOTE ADULT - PROBLEM SELECTOR PLAN 3
- s/p multiple thoracenteses with most recent thoracentesis resulting in removal of 1L of serosanguinous fluid   - Cytopathology negative for malignant cells; pleural fluid from 2/8 exudative by Light's criteria; pleural fluid Cx unremarkable   - Thoracic Surgery consulted  -s/p bedside PTC placement 2/24  -tube management

## 2024-02-24 NOTE — PROGRESS NOTE ADULT - SUBJECTIVE AND OBJECTIVE BOX
[facility] Division of Hospital Medicine  Lucina Lockwood MD  Available via MS Teams  Pager: [phone]    SUBJECTIVE / OVERNIGHT EVENTS:  RRT this am, pt oriented to person, hospital, year 2024. limited comunication- low voice, on Bipap    MEDICATIONS  (STANDING):  acetaminophen   IVPB .. 1000 milliGRAM(s) IV Intermittent once  allopurinol 100 milliGRAM(s) Oral daily  aspirin enteric coated 81 milliGRAM(s) Oral daily  atorvastatin 40 milliGRAM(s) Oral at bedtime  budesonide 160 MICROgram(s)/formoterol 4.5 MICROgram(s) Inhaler 2 Puff(s) Inhalation two times a day  dexAMETHasone  Injectable 6 milliGRAM(s) IV Push daily  metoprolol succinate ER 50 milliGRAM(s) Oral daily  pantoprazole    Tablet 40 milliGRAM(s) Oral before breakfast  piperacillin/tazobactam IVPB.. 3.375 Gram(s) IV Intermittent every 12 hours  polyethylene glycol 3350 17 Gram(s) Oral two times a day  remdesivir  IVPB 100 milliGRAM(s) IV Intermittent every 24 hours  remdesivir  IVPB   IV Intermittent   senna 2 Tablet(s) Oral at bedtime  sodium chloride 3%  Inhalation 4 milliLiter(s) Inhalation every 12 hours  tiotropium 2.5 MICROgram(s) Inhaler 2 Puff(s) Inhalation daily    MEDICATIONS  (PRN):  acetaminophen     Tablet .. 650 milliGRAM(s) Oral every 4 hours PRN Mild Pain (1 - 3)  albuterol    90 MICROgram(s) HFA Inhaler 2 Puff(s) Inhalation every 6 hours PRN Shortness of Breath and/or Wheezing      I&O's Summary      PHYSICAL EXAM:  Vital Signs Last 24 Hrs  T(C): 37.1 (24 Feb 2024 05:15), Max: 37.1 (24 Feb 2024 05:15)  T(F): 98.8 (24 Feb 2024 05:15), Max: 98.8 (24 Feb 2024 05:15)  HR: 90 (24 Feb 2024 05:15) (78 - 90)  BP: 121/70 (24 Feb 2024 05:15) (121/70 - 128/78)  BP(mean): --  RR: 20 (24 Feb 2024 10:41) (16 - 20)  SpO2: 99% (24 Feb 2024 10:41) (96% - 100%)    Parameters below as of 24 Feb 2024 10:41  Patient On (Oxygen Delivery Method): nasal cannula, high flow  O2 Flow (L/min): 60  O2 Concentration (%): 100  CONSTITUTIONAL: NAD  EYES: PERRLA; conjunctiva and sclera clear  ENMT: Moist oral mucosa, no pharyngeal injection or exudates  NECK: Supple, no palpable masses  RESPIRATORY: comfortable on Bipap, crackles b/l  CARDIOVASCULAR: Regular rate and rhythm, normal S1 and S2, no murmur/rub/gallop; No lower extremity edema; Peripheral pulses are 2+ bilaterally  ABDOMEN: Nontender to palpation, normoactive bowel sounds, no rebound/guarding  MUSCULOSKELETAL:  no clubbing or cyanosis of digits; no joint swelling or tenderness to palpation  PSYCH: A+O to person, place, and time; affect appropriate  NEUROLOGY: CN 2-12 are intact and symmetric; no gross sensory deficits   SKIN: No rashes; no palpable lesions    LABS:                        11.6   13.36 )-----------( 241      ( 24 Feb 2024 06:57 )             37.1     02-24    142  |  96<L>  |  91<H>  ----------------------------<  120<H>  4.1   |  31  |  2.30<H>    Ca    9.4      24 Feb 2024 06:57  Phos  5.4     02-24  Mg     2.90     02-24      PTT - ( 23 Feb 2024 04:00 )  PTT:80.9 sec      Urinalysis Basic - ( 24 Feb 2024 06:57 )    Color: x / Appearance: x / SG: x / pH: x  Gluc: 120 mg/dL / Ketone: x  / Bili: x / Urobili: x   Blood: x / Protein: x / Nitrite: x   Leuk Esterase: x / RBC: x / WBC x   Sq Epi: x / Non Sq Epi: x / Bacteria: x        Culture - Fungal, Body Fluid (collected 23 Feb 2024 15:30)  Source: Pleural Fl Pleural Fluid  Preliminary Report (24 Feb 2024 07:47):    Testing in progress    Culture - Body Fluid with Gram Stain (collected 23 Feb 2024 15:30)  Source: Pleural Fl Pleural Fluid  Gram Stain (23 Feb 2024 23:24):    No polymorphonuclear leukocytes per low power field    No organisms seen per oil power field      SARS-CoV-2: Detected (19 Feb 2024 05:12)  SARS-CoV-2: NotDetec (06 Feb 2024 18:22)  SARS-CoV-2: NotDetec (18 Dec 2023 10:15)  SARS-CoV-2: NotDetec (22 Nov 2023 13:35)  SARS-CoV-2: NotDetec (15 Oct 2023 22:50)  SARS-CoV-2: NotDetec (18 Sep 2023 19:50)      RADIOLOGY & ADDITIONAL TESTS:  New Results Reviewed Today:   New Imaging Personally Reviewed Today:  New Electrocardiogram Personally Reviewed Today:  Prior or Outpatient Records Reviewed Today:    COMMUNICATION:  Care Discussed with Consultants/Other Providers and Details of Discussion:  Discussions with Patient/Family:       [facility] Division of Hospital Medicine  Lucina Lockwood MD  Available via MS Teams  Pager: [phone]    SUBJECTIVE / OVERNIGHT EVENTS:  RRT this am, pt oriented to person, hospital, year 2024. limited comunication- low voice, on Bipap    MEDICATIONS  (STANDING):  acetaminophen   IVPB .. 1000 milliGRAM(s) IV Intermittent once  allopurinol 100 milliGRAM(s) Oral daily  aspirin enteric coated 81 milliGRAM(s) Oral daily  atorvastatin 40 milliGRAM(s) Oral at bedtime  budesonide 160 MICROgram(s)/formoterol 4.5 MICROgram(s) Inhaler 2 Puff(s) Inhalation two times a day  dexAMETHasone  Injectable 6 milliGRAM(s) IV Push daily  metoprolol succinate ER 50 milliGRAM(s) Oral daily  pantoprazole    Tablet 40 milliGRAM(s) Oral before breakfast  piperacillin/tazobactam IVPB.. 3.375 Gram(s) IV Intermittent every 12 hours  polyethylene glycol 3350 17 Gram(s) Oral two times a day  remdesivir  IVPB 100 milliGRAM(s) IV Intermittent every 24 hours  remdesivir  IVPB   IV Intermittent   senna 2 Tablet(s) Oral at bedtime  sodium chloride 3%  Inhalation 4 milliLiter(s) Inhalation every 12 hours  tiotropium 2.5 MICROgram(s) Inhaler 2 Puff(s) Inhalation daily    MEDICATIONS  (PRN):  acetaminophen     Tablet .. 650 milliGRAM(s) Oral every 4 hours PRN Mild Pain (1 - 3)  albuterol    90 MICROgram(s) HFA Inhaler 2 Puff(s) Inhalation every 6 hours PRN Shortness of Breath and/or Wheezing      I&O's Summary      PHYSICAL EXAM:  Vital Signs Last 24 Hrs  T(C): 37.1 (24 Feb 2024 05:15), Max: 37.1 (24 Feb 2024 05:15)  T(F): 98.8 (24 Feb 2024 05:15), Max: 98.8 (24 Feb 2024 05:15)  HR: 90 (24 Feb 2024 05:15) (78 - 90)  BP: 121/70 (24 Feb 2024 05:15) (121/70 - 128/78)  BP(mean): --  RR: 20 (24 Feb 2024 10:41) (16 - 20)  SpO2: 99% (24 Feb 2024 10:41) (96% - 100%)    Parameters below as of 24 Feb 2024 10:41  Patient On (Oxygen Delivery Method): nasal cannula, high flow  O2 Flow (L/min): 60  O2 Concentration (%): 100  CONSTITUTIONAL: NAD  EYES: PERRLA; conjunctiva and sclera clear  ENMT: Moist oral mucosa, no pharyngeal injection or exudates  NECK: Supple, no palpable masses  RESPIRATORY: comfortable on Bipap, crackles b/l  CARDIOVASCULAR: Regular rate and rhythm, normal S1 and S2, no murmur/rub/gallop; No lower extremity edema; Peripheral pulses are 2+ bilaterally  ABDOMEN: Nontender to palpation, normoactive bowel sounds, no rebound/guarding  MUSCULOSKELETAL:  no clubbing or cyanosis of digits; no joint swelling or tenderness to palpation  NEUROLOGY: CN 2-12 are intact and symmetric; no gross sensory deficits   SKIN: No rashes; no palpable lesions    LABS:                        11.6   13.36 )-----------( 241      ( 24 Feb 2024 06:57 )             37.1     02-24    142  |  96<L>  |  91<H>  ----------------------------<  120<H>  4.1   |  31  |  2.30<H>    Ca    9.4      24 Feb 2024 06:57  Phos  5.4     02-24  Mg     2.90     02-24      PTT - ( 23 Feb 2024 04:00 )  PTT:80.9 sec      Urinalysis Basic - ( 24 Feb 2024 06:57 )    Color: x / Appearance: x / SG: x / pH: x  Gluc: 120 mg/dL / Ketone: x  / Bili: x / Urobili: x   Blood: x / Protein: x / Nitrite: x   Leuk Esterase: x / RBC: x / WBC x   Sq Epi: x / Non Sq Epi: x / Bacteria: x        Culture - Fungal, Body Fluid (collected 23 Feb 2024 15:30)  Source: Pleural Fl Pleural Fluid  Preliminary Report (24 Feb 2024 07:47):    Testing in progress    Culture - Body Fluid with Gram Stain (collected 23 Feb 2024 15:30)  Source: Pleural Fl Pleural Fluid  Gram Stain (23 Feb 2024 23:24):    No polymorphonuclear leukocytes per low power field    No organisms seen per oil power field      SARS-CoV-2: Detected (19 Feb 2024 05:12)  SARS-CoV-2: NotDetec (06 Feb 2024 18:22)  SARS-CoV-2: NotDetec (18 Dec 2023 10:15)  SARS-CoV-2: NotDetec (22 Nov 2023 13:35)  SARS-CoV-2: NotDetec (15 Oct 2023 22:50)  SARS-CoV-2: NotDetec (18 Sep 2023 19:50)      RADIOLOGY & ADDITIONAL TESTS:  New Results Reviewed Today:   < from: Xray Chest 1 View- PORTABLE-Urgent (Xray Chest 1 View- PORTABLE-Urgent .) (02.24.24 @ 06:54) >  2/24/2024 6:08 AM:  Similar left pneumothorax, bilateral lung opacities and pleural   effusions. Similar positioning of the left chest tube.    IMPRESSION:  Interval placement of left chest tube with improved aeration of the left   lung and decreased left pleural effusion.    There has been development of a small left apical pneumothorax which is   stable in size over multiple films.    Bilateral pulmonary opacities.    Small right pleural effusion.    < end of copied text >    Repeat CXR this am, personally reviewed, increased new RML consolidation, small left pneumothorax

## 2024-02-24 NOTE — PROGRESS NOTE ADULT - PROBLEM SELECTOR PLAN 2
worsening infiltrates on CXR despite Abx, Remdesevir  -now on Bipap/ HFNC  -CTX 2/20-, changed to Zosyn 2/24  -Sputum cx: normal blaine  -urine legionella negative  -c/w Remdesevir   -started on decadron

## 2024-02-25 NOTE — PROGRESS NOTE ADULT - PROBLEM SELECTOR PLAN 2
worsening infiltrates on CXR despite Abx, Remdesevir  -now on HFNC  -CTX 2/20-, changed to Zosyn 2/24  -Sputum cx: normal blaine  -urine legionella negative  -c/w Remdesevir   -c/w decadron

## 2024-02-25 NOTE — PROGRESS NOTE ADULT - PROBLEM SELECTOR PLAN 3
Per daughter Frida, HCP- pt full code  Discussed with sister at bedside today, she is concerned daughter is not very involved with pt care and hasn't even visit the hospital. Sister understands is in pt best interest to be comfort care  -will consult palliative care to assist with GOC conversations  overall poor prognosis

## 2024-02-25 NOTE — PROGRESS NOTE ADULT - ASSESSMENT
89M PMHx A. fib on Eliquis, CHF (EF 45-50%, combined systolic and diastolic), CVA, HTN, COPD, CKD3, multiple hospitalizations for CHF exacerbation in the prior months, left sided pleural effusion that was drained last hospitalization, admitted to [facility] on 2/6/2024 for CHF exacerbation and acute on chronic hypoxic respiratory failure 2/2 large left pleural effusion. Patient transferred to [facility] for thoracic surgery eval. Now c/b covid+, on Remdesevir. s/p pigtail by CT surg 2/23. Worsening respiratory status 2/24 now on Bipap

## 2024-02-25 NOTE — PROGRESS NOTE ADULT - PROBLEM SELECTOR PLAN 1
- Patient admitted for acute hypoxic respiratory failure 2/2 recurrence of large left pleural effusion   - CT chest 2/8 with lingular and left lower lobe groundglass infiltrates likely pneumonia. Small bilateral pleural effusions. Cardiomegaly. Stable prominent mediastinal lymph nodes  - CXR 2/16 demonstrating increase in the left pleural effusion now moderate to large with associated consolidation  - Thoracic surgery consulted - s/p bedside PTC placement 2/24- 2L of output today  -was on CTX since 2/20- new RML/RLL opacity on x-ray 2/23- RRT 2/24 with hypoxemia- escalated to IV Zosyn  -s/p 5days of Remdesevir for covid+  -started dexamethasone for covid 2/24  -chest PT, hypersal nebs  -c/w HFNC 60L, 60FiO2, wean as tolerated  -Pulmonary consulted, appreciate recs  -GOC as below

## 2024-02-25 NOTE — PROGRESS NOTE ADULT - SUBJECTIVE AND OBJECTIVE BOX
[facility] Division of Hospital Medicine  Lucina Lockwood MD  Available via MS Teams  Pager: [phone]    SUBJECTIVE / OVERNIGHT EVENTS:  no events,. pt continues with SOB    MEDICATIONS  (STANDING):  acetaminophen   IVPB .. 1000 milliGRAM(s) IV Intermittent once  albuterol    90 MICROgram(s) HFA Inhaler 2 Puff(s) Inhalation every 6 hours  allopurinol 100 milliGRAM(s) Oral daily  aspirin enteric coated 81 milliGRAM(s) Oral daily  atorvastatin 40 milliGRAM(s) Oral at bedtime  budesonide 160 MICROgram(s)/formoterol 4.5 MICROgram(s) Inhaler 2 Puff(s) Inhalation two times a day  dexAMETHasone  Injectable 6 milliGRAM(s) IV Push daily  guaiFENesin Oral Liquid (Sugar-Free) 100 milliGRAM(s) Oral every 6 hours  ipratropium 17 MICROgram(s) HFA Inhaler 1 Puff(s) Inhalation every 6 hours  lactated ringers. 500 milliLiter(s) (100 mL/Hr) IV Continuous <Continuous>  metoprolol succinate ER 50 milliGRAM(s) Oral daily  pantoprazole    Tablet 40 milliGRAM(s) Oral before breakfast  piperacillin/tazobactam IVPB.. 3.375 Gram(s) IV Intermittent every 12 hours  polyethylene glycol 3350 17 Gram(s) Oral two times a day  senna 2 Tablet(s) Oral at bedtime  tiotropium 2.5 MICROgram(s) Inhaler 2 Puff(s) Inhalation daily    MEDICATIONS  (PRN):  acetaminophen     Tablet .. 650 milliGRAM(s) Oral every 4 hours PRN Mild Pain (1 - 3)      I&O's Summary    24 Feb 2024 07:01  -  25 Feb 2024 07:00  --------------------------------------------------------  IN: 0 mL / OUT: 190 mL / NET: -190 mL        PHYSICAL EXAM:  Vital Signs Last 24 Hrs  T(C): 36.5 (25 Feb 2024 05:38), Max: 36.7 (24 Feb 2024 13:45)  T(F): 97.7 (25 Feb 2024 05:38), Max: 98.1 (24 Feb 2024 20:28)  HR: 86 (25 Feb 2024 05:38) (80 - 92)  BP: 125/79 (25 Feb 2024 05:38) (125/79 - 139/66)  BP(mean): --  RR: 18 (25 Feb 2024 11:20) (18 - 20)  SpO2: 96% (25 Feb 2024 11:20) (95% - 100%)    Parameters below as of 25 Feb 2024 11:20  Patient On (Oxygen Delivery Method): nasal cannula, high flow  O2 Flow (L/min): 60  O2 Concentration (%): 60  CONSTITUTIONAL: NAD  EYES: PERRLA; conjunctiva and sclera clear  ENMT: Moist oral mucosa, no pharyngeal injection or exudates  NECK: Supple, no palpable masses  RESPIRATORY: Normal respiratory effort; crackles b/l  CARDIOVASCULAR: Regular rate and rhythm, normal S1 and S2, no murmur/rub/gallop; No lower extremity edema; Peripheral pulses are 2+ bilaterally  ABDOMEN: Nontender to palpation, normoactive bowel sounds, no rebound/guarding  MUSCULOSKELETAL:  no clubbing or cyanosis of digits; no joint swelling or tenderness to palpation  PSYCH: A+O to person, place, and time; affect appropriate  NEUROLOGY: CN 2-12 are intact and symmetric; no gross sensory deficits   SKIN: No rashes; no palpable lesions    LABS:                        10.4   11.83 )-----------( 204      ( 25 Feb 2024 05:45 )             33.8     02-25    145  |  98  |  106<H>  ----------------------------<  121<H>  4.3   |  32<H>  |  2.45<H>    Ca    9.0      25 Feb 2024 05:45  Phos  5.4     02-25  Mg     2.90     02-25            Urinalysis Basic - ( 25 Feb 2024 05:45 )    Color: x / Appearance: x / SG: x / pH: x  Gluc: 121 mg/dL / Ketone: x  / Bili: x / Urobili: x   Blood: x / Protein: x / Nitrite: x   Leuk Esterase: x / RBC: x / WBC x   Sq Epi: x / Non Sq Epi: x / Bacteria: x        Culture - Fungal, Body Fluid (collected 23 Feb 2024 15:30)  Source: Pleural Fl Pleural Fluid  Preliminary Report (24 Feb 2024 07:47):    Testing in progress    Culture - Body Fluid with Gram Stain (collected 23 Feb 2024 15:30)  Source: Pleural Fl Pleural Fluid  Gram Stain (23 Feb 2024 23:24):    No polymorphonuclear leukocytes per low power field    No organisms seen per oil power field  Preliminary Report (24 Feb 2024 16:47):    No growth to date.      SARS-CoV-2: Detected (19 Feb 2024 05:12)  SARS-CoV-2: NotDetec (06 Feb 2024 18:22)  SARS-CoV-2: NotDetec (18 Dec 2023 10:15)  SARS-CoV-2: NotDetec (22 Nov 2023 13:35)  SARS-CoV-2: NotDetec (15 Oct 2023 22:50)  SARS-CoV-2: NotDetec (18 Sep 2023 19:50)

## 2024-02-25 NOTE — PROGRESS NOTE ADULT - SUBJECTIVE AND OBJECTIVE BOX
CHIEF COMPLAINT:Patient is a 89y old  Male who presents with a chief complaint of Transfer for PleurX (25 Feb 2024 12:24)      Interval Events:  still tired, less rhoncorus breathe sounds, dry mouth, weak voice.    REVIEW OF SYSTEMS:  [x] All other systems negative except per HPI   [ ] Unable to assess ROS because ________    OBJECTIVE:  ICU Vital Signs Last 24 Hrs  T(C): 36.4 (25 Feb 2024 14:00), Max: 36.7 (24 Feb 2024 20:28)  T(F): 97.5 (25 Feb 2024 14:00), Max: 98.1 (24 Feb 2024 20:28)  HR: 84 (25 Feb 2024 14:00) (80 - 90)  BP: 154/80 (25 Feb 2024 14:00) (125/79 - 154/80)  BP(mean): --  ABP: --  ABP(mean): --  RR: 18 (25 Feb 2024 14:00) (18 - 20)  SpO2: 96% (25 Feb 2024 14:00) (96% - 100%)    O2 Parameters below as of 25 Feb 2024 14:00  Patient On (Oxygen Delivery Method): nasal cannula, high flow              02-24 @ 07:01  -  02-25 @ 07:00  --------------------------------------------------------  IN: 0 mL / OUT: 190 mL / NET: -190 mL        PHYSICAL EXAM:  GENERAL: NAD, well-groomed, well-developed  HEAD:  Atraumatic, Normocephalic  EYES: EOMI, PERRLA, conjunctiva and sclera clear  ENMT: No tonsillar erythema, exudates, or enlargement; Moist mucous membranes, Good dentition, No lesions  NECK: Supple, No JVD, Normal thyroid  CHEST/LUNG: Clear to auscultation bilaterally; No rales, rhonchi, wheezing, or rubs  HEART: Regular rate and rhythm; No murmurs, rubs, or gallops  ABDOMEN: Soft, Nontender, Nondistended; Bowel sounds present  VASCULAR:  2+ Peripheral Pulses, No clubbing, cyanosis, or edema  LYMPH: No lymphadenopathy noted  SKIN: No rashes or lesions  NERVOUS SYSTEM:  Alert & Oriented X1-2, Good concentration; Motor Strength 5/5 B/L upper and lower extremities; DTRs 2+ intact and symmetric    HOSPITAL MEDICATIONS:  MEDICATIONS  (STANDING):  acetaminophen   IVPB .. 1000 milliGRAM(s) IV Intermittent once  albuterol    90 MICROgram(s) HFA Inhaler 2 Puff(s) Inhalation every 6 hours  allopurinol 100 milliGRAM(s) Oral daily  aspirin enteric coated 81 milliGRAM(s) Oral daily  atorvastatin 40 milliGRAM(s) Oral at bedtime  budesonide 160 MICROgram(s)/formoterol 4.5 MICROgram(s) Inhaler 2 Puff(s) Inhalation two times a day  dexAMETHasone  Injectable 6 milliGRAM(s) IV Push daily  guaiFENesin Oral Liquid (Sugar-Free) 100 milliGRAM(s) Oral every 6 hours  heparin  Infusion. 800 Unit(s)/Hr (8 mL/Hr) IV Continuous <Continuous>  ipratropium 17 MICROgram(s) HFA Inhaler 1 Puff(s) Inhalation every 6 hours  lactated ringers. 500 milliLiter(s) (100 mL/Hr) IV Continuous <Continuous>  metoprolol succinate ER 50 milliGRAM(s) Oral daily  pantoprazole    Tablet 40 milliGRAM(s) Oral before breakfast  piperacillin/tazobactam IVPB.. 3.375 Gram(s) IV Intermittent every 12 hours  polyethylene glycol 3350 17 Gram(s) Oral two times a day  senna 2 Tablet(s) Oral at bedtime  tiotropium 2.5 MICROgram(s) Inhaler 2 Puff(s) Inhalation daily    MEDICATIONS  (PRN):  acetaminophen     Tablet .. 650 milliGRAM(s) Oral every 4 hours PRN Mild Pain (1 - 3)  heparin   Injectable 5500 Unit(s) IV Push every 6 hours PRN For aPTT less than 40  heparin   Injectable 2500 Unit(s) IV Push every 6 hours PRN For aPTT between 40 - 57      LABS:    The Labs were reviewed by me   The Radiology was reviewed by me    EKG tracing reviewed by me    02-25    145  |  98  |  106<H>  ----------------------------<  121<H>  4.3   |  32<H>  |  2.45<H>  02-24    142  |  96<L>  |  91<H>  ----------------------------<  120<H>  4.1   |  31  |  2.30<H>  02-23    140  |  95<L>  |  87<H>  ----------------------------<  122<H>  4.3   |  32<H>  |  2.17<H>    Ca    9.0      25 Feb 2024 05:45  Ca    9.4      24 Feb 2024 06:57  Ca    8.8      23 Feb 2024 04:00  Phos  5.4     02-25  Mg     2.90     02-25      Magnesium: 2.90 mg/dL (02-25-24 @ 05:45)  Magnesium: 2.90 mg/dL (02-24-24 @ 06:57)  Magnesium: 3.00 mg/dL (02-23-24 @ 04:00)    Phosphorus: 5.4 mg/dL (02-25-24 @ 05:45)  Phosphorus: 5.4 mg/dL (02-24-24 @ 06:57)  Phosphorus: 5.6 mg/dL (02-23-24 @ 04:00)                    Urinalysis Basic - ( 25 Feb 2024 05:45 )    Color: x / Appearance: x / SG: x / pH: x  Gluc: 121 mg/dL / Ketone: x  / Bili: x / Urobili: x   Blood: x / Protein: x / Nitrite: x   Leuk Esterase: x / RBC: x / WBC x   Sq Epi: x / Non Sq Epi: x / Bacteria: x      ABG - ( 24 Feb 2024 15:55 )  pH, Arterial: 7.39  pH, Blood: x     /  pCO2: 57    /  pO2: 89    / HCO3: 34    / Base Excess: 8.0   /  SaO2: 98.0                                    10.4   11.83 )-----------( 204      ( 25 Feb 2024 05:45 )             33.8                         11.6   13.36 )-----------( 241      ( 24 Feb 2024 06:57 )             37.1                         9.7    10.29 )-----------( 188      ( 23 Feb 2024 04:00 )             31.9     CAPILLARY BLOOD GLUCOSE            MICROBIOLOGY:     RADIOLOGY:  [ ] Reviewed and interpreted by me    Point of Care Ultrasound Findings:    PFT:    EKG:

## 2024-02-25 NOTE — PROGRESS NOTE ADULT - PROBLEM SELECTOR PLAN 8
- TTE 2/7 demonstrating EF 55-60%, moderate pulmonary hypertension, moderate mitral stenosis, severely enlarged b/l atria   - C/w Lasix 40 IV daily- hold due to IRINEO- monitor renal function, resume when able  - Monitor I/Os

## 2024-02-25 NOTE — PROGRESS NOTE ADULT - ATTENDING COMMENTS
89M PMHx A. fib previously on Eliquis, CHF (EF 45-50%, combined systolic and diastolic), CVA, HTN, COPD, CKD3, recent admissions for CHF. Patient and recurrent pleural effusions s/p multiple thoracentesis, recurrent cyto negative.     Was transferred to [facility] for thoracic eval for pleurx, VATS and possible EBUS. Complicated by COVID.   Is s/p chest tube placement for pleural effusions.   Patient with acute worsening of his hypoxemic respiratory failure. Is s/p chest tube with likely a trapped lung.   However with new opacity on the right concerning for mucus plugging vs pna. Was started on HFNC and BIPAP.     # acute on chronic hypoxemic and hypercapnic RF  # Pneumonia vs mucus plugging  # pleural effusion  - acute worsening 2/2 to mucus plugging vs pneumonia, c/w abx, check sputum cx  - Would tiral on NC of 6L. HFNC, currently without humidifier and patient with now throat irritation and mouth dryness. . Would avoid bipap unless patient had narocosis as he requirses airway clearance.   - trend blood gas  - Will need on going aggressive chest PT.   - Agree with treatment for COVID  - Likely with trapped lung, F/U thoracic, may benefit for pleural biopsy/EBUS once acute issues resolve  Chest tube management per CT surgery.   - OOB, to chair, incentive cheryl.

## 2024-02-25 NOTE — PROGRESS NOTE ADULT - ASSESSMENT
89M with PMHx A. fib previously on Eliquis, CHF (EF 45-50%, combined systolic and diastolic), CVA, HTN, COPD, CKD3, multiple hospitalizations for CHF exacerbation in the prior months, left sided pleural effusion that was drained last hospitalization, admitted to [facility] on 2/6/2024 for CHF exacerbation and acute on chronic hypoxic respiratory failure 2/2 large left pleural effusion. TTE on 2/7 showed EF 55-60%, pulmonary hypertension, moderate mitral stenosis. He was evaluated by Pulmonary and had Thoracentesis on 2/8, 1 L bloody output drained.  transfer to [facility] for Pleurex catheter and possible EUS for lymph node biopsy.  Patient placed on IV lasix for diuresis. Patient tested COVID+ on 2/19   L PTC placed 1.8L drained, new R sided PNA    #recommendations  patient with worsening hypoxemia in the setting of increased sputum, and leukocytosis with new Right sided PNA and mucus plugging   s/p left chest tube to waterseal with likely non reexpandable lung  CTS to manage chest tube and exudative serosang but cyto neg pleural effusion  with some minor hypercapnia on VBG repeat ABG in the 2-4 hours  continue hfnc and descalate as tolerated  s/p ceftriaxone, remedesivir.  complete course of zosyn  MRSA swab  continue decadron for covid  airway clearance: humidified oxygen, standing guaifenesin, duonebs q6h, hyper sal q6h, chest PT q6 h, aerobika/acapella q6 h, deep NT suction q6 hr, chest vest q12h  please ensure that humidifier is on and working for high ceci and also decrease his high ceci settings as tolerated, if can tolerate 40/40 then should transition to regular nasal cannula with humidified oxygen.,   diurese as tolerated  titrate oxygen to O2 >88%, use humidified oxygen  incentive spirometry, OOB to chair, PT/OT  aspiration precautions as necessary  DVT ppx as tolerated  airway clearance as necessary 89M with PMHx A. fib previously on Eliquis, CHF (EF 45-50%, combined systolic and diastolic), CVA, HTN, COPD, CKD3, multiple hospitalizations for CHF exacerbation in the prior months, left sided pleural effusion that was drained last hospitalization, admitted to [facility] on 2/6/2024 for CHF exacerbation and acute on chronic hypoxic respiratory failure 2/2 large left pleural effusion. TTE on 2/7 showed EF 55-60%, pulmonary hypertension, moderate mitral stenosis. He was evaluated by Pulmonary and had Thoracentesis on 2/8, 1 L bloody output drained.  transfer to [facility] for Pleurex catheter and possible EUS for lymph node biopsy.  Patient placed on IV lasix for diuresis. Patient tested COVID+ on 2/19   L PTC placed 1.8L drained, new R sided PNA    #recommendations  patient with worsening hypoxemia in the setting of increased sputum, and leukocytosis with new Right sided PNA and mucus plugging   s/p left chest tube to waterseal with likely non reexpandable lung  CTS to manage chest tube and exudative serosang but cyto neg pleural effusion  with some minor hypercapnia on VBG repeat ABG in the 2-4 hours  continue hfnc and descalate as tolerated  s/p ceftriaxone, remedesivir.  complete course of zosyn  MRSA swab  continue decadron for covid  airway clearance: humidified oxygen, duonebs q6h, hyper sal q6h, chest PT q6 h, aerobika/acapella q6 h, chest vest q12h  please ensure that humidifier is on and working for high ceci and also decrease his high ceci settings as tolerated, if can tolerate 40/40 then should transition to regular nasal cannula with humidified oxygen.,   diurese as tolerated  titrate oxygen to O2 >88%, use humidified oxygen  incentive spirometry, OOB to chair, PT/OT  aspiration precautions as necessary  DVT ppx as tolerated  airway clearance as necessary

## 2024-02-25 NOTE — PROGRESS NOTE ADULT - SUBJECTIVE AND OBJECTIVE BOX
Cardiovascular Disease Progress Note  Date of service: 02-25-24 @ 10:55    Overnight events: No acute events overnight.  Patient is in no distress. On HFNC saturating well. Family at bedside.   Otherwise review of systems negative    Objective Findings:  T(C): 36.5 (02-25-24 @ 05:38), Max: 36.7 (02-24-24 @ 13:45)  HR: 86 (02-25-24 @ 05:38) (80 - 92)  BP: 125/79 (02-25-24 @ 05:38) (125/79 - 139/66)  RR: 18 (02-25-24 @ 07:21) (18 - 20)  SpO2: 97% (02-25-24 @ 07:21) (95% - 100%)  Wt(kg): --  Daily     Daily       Physical Exam:  Gen: NAD; Patient resting comfortably  HEENT: EOMI, Normocephalic/ atraumatic  CV: RRR, normal S1 + S2, no m/r/g  Lungs:  on HFNC, Diffuse crackles at lung base.   Abd: soft, non-tender; bowel sounds present  Ext: No edema; warm and well perfused    Telemetry: Afib    Laboratory Data:                        10.4   11.83 )-----------( 204      ( 25 Feb 2024 05:45 )             33.8     02-25    145  |  98  |  106<H>  ----------------------------<  121<H>  4.3   |  32<H>  |  2.45<H>    Ca    9.0      25 Feb 2024 05:45  Phos  5.4     02-25  Mg     2.90     02-25                Inpatient Medications:  MEDICATIONS  (STANDING):  acetaminophen   IVPB .. 1000 milliGRAM(s) IV Intermittent once  albuterol    90 MICROgram(s) HFA Inhaler 2 Puff(s) Inhalation every 6 hours  allopurinol 100 milliGRAM(s) Oral daily  aspirin enteric coated 81 milliGRAM(s) Oral daily  atorvastatin 40 milliGRAM(s) Oral at bedtime  budesonide 160 MICROgram(s)/formoterol 4.5 MICROgram(s) Inhaler 2 Puff(s) Inhalation two times a day  dexAMETHasone  Injectable 6 milliGRAM(s) IV Push daily  guaiFENesin Oral Liquid (Sugar-Free) 100 milliGRAM(s) Oral every 6 hours  ipratropium 17 MICROgram(s) HFA Inhaler 1 Puff(s) Inhalation every 6 hours  lactated ringers. 500 milliLiter(s) (100 mL/Hr) IV Continuous <Continuous>  metoprolol succinate ER 50 milliGRAM(s) Oral daily  pantoprazole    Tablet 40 milliGRAM(s) Oral before breakfast  piperacillin/tazobactam IVPB.. 3.375 Gram(s) IV Intermittent every 12 hours  polyethylene glycol 3350 17 Gram(s) Oral two times a day  senna 2 Tablet(s) Oral at bedtime  tiotropium 2.5 MICROgram(s) Inhaler 2 Puff(s) Inhalation daily      Assessment:  89 year old man with a-fib, HTN, prior CVA, and mitral stenosis presents with acute diastolic CHF and pleural effusion complicated by COVID-19.     Plan of Care:    #Acute diastolic CHF-  Associated with progressive hypoxic respiratory failure now on HFNC  CXR shows progressive L pleural effusions- s/p chest tube 2/23  Patient also with New R sided Pna and mucus plugging, Pulm input reviewed. Most likely etiology of patients SOB.   ABx started along with gentle hydration for IRINEO  Diuretics held for acute on chronic renal disease.   Echo reviewed.  Poor overall prognosis.       #A-fib-  Associated with moderate mitral stenosis.  Rates are controlled on Lopressor.  AC on hold given recent thoracic intervention.     #COVID-19-  Management as per the pulmonary team.             Over 55 minutes spent on total encounter; more than 50% of the visit was spent counseling and/or coordinating care by the attending physician.      Ovi Crowley DO [facility]  Cardiovascular Disease  [phone]

## 2024-02-26 NOTE — PROGRESS NOTE ADULT - PROBLEM SELECTOR PLAN 3
Spoke with daughter Frida ([phone] ) via telephone - says she herself as health issues with hx brain surgery, now in PT program and doing better; has another sister Lora who plans to visit , flight lands at [facility] at 12p and she is coming straight to the hospital. Had a brother who is now . Mother "left pt 50y ago but has now returned as she promised to care for the kids if anything happened to pt"; they have been  for 50 years. Pt has a girlfriend Antonietta at present. HCP is shared between both daughters. Frida states she and Lora have discussed plan for FULL CODE until Lora is able to visit, after which they likely would switch to DNR/DNI. Will have further GOC discussion in person  planned tentatively for 2pm.  -palliative following, appreciate recs

## 2024-02-26 NOTE — RAPID RESPONSE TEAM SUMMARY - NSSITUATIONBACKGROUNDRRT_GEN_ALL_CORE
89M with PMHx A. susana previously on Eliquis, CHF (EF 45-50%, combined systolic and diastolic), CVA, HTN, COPD, CKD3, admitted w/ acute on chronic hypoxic respiratory failure w/ large L pleff s/p L PTC. Transferred to [facility] for Pleurex cath and possible EUS for LN biopsy. RRT called for hypoxia to 60s while on HFNC, poor waveform. By the time rapid team arrived, SpO2 was % w/ good pleth after switching pulse ox; patient comfortable, no changes made to high flow settings. Chest tube site examined, sutured in place but oozing noted by primary RN.

## 2024-02-26 NOTE — PROGRESS NOTE ADULT - SUBJECTIVE AND OBJECTIVE BOX
[facility] Department of Hospital Medicine  Cornelia Downing DO  Available on MS Teams  Pager: [phone]    Patient is a 89y old  Male who presents with a chief complaint of Transfer for PleurX (2024 14:06)    Subjective:  Pt seen and examined at bedside during RRT. Found to have poor wave form and pulse ox; O2 normalized with improved pulse ox placement.   Spoke with daughter Frida ([phone] ) via telephone - says she herself as health issues with hx brain surgery, now in PT program and doing better; has another sister Lora who plans to visit , flight lands at [facility] at 12p and she is coming straight to the hospital. Had a brother who is now . Mother "left pt 50y ago but has now returned as she promised to care for the kids if anything happened to pt"; they have been  for 50 years. Pt has a girlfriend Antonietta at present. HCP is shared between both daughters. Frida states she and Lora have discussed plan for FULL CODE until Lora is able to visit, after which they likely would switch to DNR/DNI. Will have further GOC discussion in person  planned tentatively for 2pm.    VITAL SIGNS:  T(C): 36.4 (24 @ 09:30), Max: 36.6 (24 @ 05:30)  T(F): 97.5 (24 @ 09:30), Max: 97.8 (24 @ 05:30)  HR: 87 (24 @ 13:49) (76 - 95)  BP: 142/73 (24 @ 09:30) (107/64 - 142/73)  BP(mean): --  RR: 18 (24 @ 13:49) (18 - 20)  SpO2: 100% (24 @ 13:49) (96% - 100%)  Wt(kg): --    PHYSICAL EXAM:  Constitutional: resting in bed; NAD; muffled speech/poor phonation  Head: NC/AT  Eyes: PERRL, EOMI, anicteric sclera  ENT: no nasal discharge; MMM  Neck: supple; no JVD  Respiratory: diffuse wheezing/crackles B/L; on HFNC  Cardiac: +S1/S2; RRR; no M/R/G  Gastrointestinal: soft, NT/ND; no rebound or guarding; +BSx4  Extremities: WWP, no clubbing or cyanosis; no peripheral edema  Musculoskeletal: NROM x4; no joint swelling, tenderness or erythema  Vascular: 2+ radial, DP/PT pulses B/L  Dermatologic: skin warm, dry and intact; no rashes, wounds, or scars  Neurologic: AAOx2-3; CNII-XII grossly intact; no focal deficits    MEDICATIONS  (STANDING):  albuterol    90 MICROgram(s) HFA Inhaler 2 Puff(s) Inhalation every 6 hours  allopurinol 100 milliGRAM(s) Oral daily  aspirin enteric coated 81 milliGRAM(s) Oral daily  atorvastatin 40 milliGRAM(s) Oral at bedtime  budesonide 160 MICROgram(s)/formoterol 4.5 MICROgram(s) Inhaler 2 Puff(s) Inhalation two times a day  dexAMETHasone  Injectable 6 milliGRAM(s) IV Push daily  heparin  Infusion 800 Unit(s)/Hr (8 mL/Hr) IV Continuous <Continuous>  hydrocodone/homatropine Syrup 5 milliLiter(s) Oral every 6 hours  ipratropium 17 MICROgram(s) HFA Inhaler 1 Puff(s) Inhalation every 6 hours  lactated ringers. 1000 milliLiter(s) (100 mL/Hr) IV Continuous <Continuous>  metoprolol succinate ER 50 milliGRAM(s) Oral daily  pantoprazole    Tablet 40 milliGRAM(s) Oral before breakfast  piperacillin/tazobactam IVPB.. 3.375 Gram(s) IV Intermittent every 12 hours  polyethylene glycol 3350 17 Gram(s) Oral two times a day  senna 2 Tablet(s) Oral at bedtime  sodium chloride 0.65% Nasal 1 Spray(s) Both Nostrils two times a day  sodium chloride 0.9% for Nebulization 3 milliLiter(s) Nebulizer every 6 hours  tiotropium 2.5 MICROgram(s) Inhaler 2 Puff(s) Inhalation daily    MEDICATIONS  (PRN):  acetaminophen     Tablet .. 650 milliGRAM(s) Oral every 6 hours PRN Temp greater or equal to 38C (100.4F), Mild Pain (1 - 3)    LABS:                        10.1   15.97 )-----------( 228      ( 2024 15:00 )             31.5         146<H>  |  99  |  106<H>  ----------------------------<  129<H>  4.1   |  32<H>  |  2.60<H>    Ca    9.0      2024 03:49  Phos  5.5       Mg     3.10           PTT - ( 2024 15:00 )  PTT:59.9 sec  Urinalysis Basic - ( 2024 03:49 )    Color: x / Appearance: x / SG: x / pH: x  Gluc: 129 mg/dL / Ketone: x  / Bili: x / Urobili: x   Blood: x / Protein: x / Nitrite: x   Leuk Esterase: x / RBC: x / WBC x   Sq Epi: x / Non Sq Epi: x / Bacteria: x      CAPILLARY BLOOD GLUCOSE      POCT Blood Glucose.: 121 mg/dL (2024 09:35)      RADIOLOGY & ADDITIONAL TESTS: Reviewed.     [facility] Department of Hospital Medicine  Cornelia Downing DO  Available on MS Teams  Pager: [phone]    Patient is a 89y old  Male who presents with a chief complaint of Transfer for PleurX (2024 14:06)    Subjective:  Pt seen and examined at bedside during RRT. Found to have poor wave form and pulse ox; O2 normalized with improved pulse ox placement.   Spoke with daughter Frida ([phone] ) via telephone - says she herself as health issues with hx brain surgery, now in PT program and doing better; has another sister Lora who plans to visit , flight lands at [facility] at 12p and she is coming straight to the hospital. Had a brother who is now . Mother "left pt 50y ago but has now returned as she promised to care for the kids if anything happened to pt"; they have been  for 50 years. Pt has a girlfriend Antonietta at present. HCP is shared between both daughters. Frida states she and Lora have discussed plan for FULL CODE until Lora is able to visit, after which they likely would switch to DNR/DNI. Will have further GOC discussion in person  planned tentatively for 2pm.    VITAL SIGNS:  T(C): 36.4 (24 @ 09:30), Max: 36.6 (24 @ 05:30)  T(F): 97.5 (24 @ 09:30), Max: 97.8 (24 @ 05:30)  HR: 87 (24 @ 13:49) (76 - 95)  BP: 142/73 (24 @ 09:30) (107/64 - 142/73)  BP(mean): --  RR: 18 (24 @ 13:49) (18 - 20)  SpO2: 100% (24 @ 13:49) (96% - 100%)  Wt(kg): --    PHYSICAL EXAM:  Constitutional: resting in bed; NAD; muffled speech/poor phonation  Head: NC/AT  Eyes: PERRL, EOMI, anicteric sclera  ENT: no nasal discharge; MMM  Neck: supple; no JVD  Respiratory: diffuse wheezing/crackles B/L; L pigtail with recently changed dressing c/d/i; on HFNC  Cardiac: +S1/S2; RRR; no M/R/G  Gastrointestinal: soft, NT/ND; no rebound or guarding; +BSx4  Extremities: WWP, no clubbing or cyanosis; no peripheral edema  Musculoskeletal: NROM x4; no joint swelling, tenderness or erythema  Vascular: 2+ radial, DP/PT pulses B/L  Dermatologic: skin warm, dry and intact; no rashes, wounds, or scars  Neurologic: AAOx2-3; CNII-XII grossly intact; no focal deficits    MEDICATIONS  (STANDING):  albuterol    90 MICROgram(s) HFA Inhaler 2 Puff(s) Inhalation every 6 hours  allopurinol 100 milliGRAM(s) Oral daily  aspirin enteric coated 81 milliGRAM(s) Oral daily  atorvastatin 40 milliGRAM(s) Oral at bedtime  budesonide 160 MICROgram(s)/formoterol 4.5 MICROgram(s) Inhaler 2 Puff(s) Inhalation two times a day  dexAMETHasone  Injectable 6 milliGRAM(s) IV Push daily  heparin  Infusion 800 Unit(s)/Hr (8 mL/Hr) IV Continuous <Continuous>  hydrocodone/homatropine Syrup 5 milliLiter(s) Oral every 6 hours  ipratropium 17 MICROgram(s) HFA Inhaler 1 Puff(s) Inhalation every 6 hours  lactated ringers. 1000 milliLiter(s) (100 mL/Hr) IV Continuous <Continuous>  metoprolol succinate ER 50 milliGRAM(s) Oral daily  pantoprazole    Tablet 40 milliGRAM(s) Oral before breakfast  piperacillin/tazobactam IVPB.. 3.375 Gram(s) IV Intermittent every 12 hours  polyethylene glycol 3350 17 Gram(s) Oral two times a day  senna 2 Tablet(s) Oral at bedtime  sodium chloride 0.65% Nasal 1 Spray(s) Both Nostrils two times a day  sodium chloride 0.9% for Nebulization 3 milliLiter(s) Nebulizer every 6 hours  tiotropium 2.5 MICROgram(s) Inhaler 2 Puff(s) Inhalation daily    MEDICATIONS  (PRN):  acetaminophen     Tablet .. 650 milliGRAM(s) Oral every 6 hours PRN Temp greater or equal to 38C (100.4F), Mild Pain (1 - 3)    LABS:                        10.1   15.97 )-----------( 228      ( 2024 15:00 )             31.5         146<H>  |  99  |  106<H>  ----------------------------<  129<H>  4.1   |  32<H>  |  2.60<H>    Ca    9.0      2024 03:49  Phos  5.5       Mg     3.10           PTT - ( 2024 15:00 )  PTT:59.9 sec  Urinalysis Basic - ( 2024 03:49 )    Color: x / Appearance: x / SG: x / pH: x  Gluc: 129 mg/dL / Ketone: x  / Bili: x / Urobili: x   Blood: x / Protein: x / Nitrite: x   Leuk Esterase: x / RBC: x / WBC x   Sq Epi: x / Non Sq Epi: x / Bacteria: x      CAPILLARY BLOOD GLUCOSE      POCT Blood Glucose.: 121 mg/dL (2024 09:35)      RADIOLOGY & ADDITIONAL TESTS: Reviewed.

## 2024-02-26 NOTE — CONSULT NOTE ADULT - PROBLEM SELECTOR RECOMMENDATION 3
- Spoke with daughter Frida who is surrogate, another daughter is coming from out of state on Wednesday.  Frida requesting that her father be kept full code until her sisters arrival on Wednesday. - 2/26- See [facility]. Spoke with daughter Frida who is surrogate, another daughter is coming from out of state on Wednesday.  Frida requesting that her father be kept full code until her sisters arrival on Wednesday.

## 2024-02-26 NOTE — PROGRESS NOTE ADULT - ASSESSMENT
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 Thora on 2/8 with bloody output of 1L. Transferred to [facility] for PleurX evaluation and possible EBUS. Patient on diuresis with IV lasix. Found to be COVID+ on 2/19. PCT placed by thoracic.    # Acute on chronic hypoxemic respiratory failure   # CHF exacerbation  # Bloody Pleural effusion s/p CT with non-expandable lung  # COVID19 + - s/p RDV  # R Pneumonia with mucus plugging - s/p CTX    Recommendations  -chest tube management and possible future procedures per CTS  -continue to wean down HFNC currently on 50/50, if can wean to 40/40 can switch to humidified NC at 6L  -c/w decadron for covid  -c/w diuresus  - please obtain LE duplex for asymmetric edema  -airway clearance: humidified oxygen, duonebs q6h, hyper sal q12h, chest PT q6 h, aerobika/acapella q6 h  -titrate oxygen to O2 >88%, use humidified oxygen  -incentive spirometry, OOB to chair, PT/OT  -aspiration precautions as necessary  -DVT ppx as tolerated   89M with PMH AF (on apixaban), 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 Thora on 2/8 with bloody output of 1L. Transferred to [facility] for PleurX evaluation and possible EBUS. Patient on diuresis with IV lasix. Found to be COVID+ on 2/19. PCT placed by thoracic.    # Acute on chronic hypoxemic respiratory failure   # CHF exacerbation  # Bloody Pleural effusion s/p CT with non-expandable lung  # COVID19 + - s/p RDV  # R Pneumonia with mucus plugging - s/p CTX    Recommendations  -chest tube management and possible future procedures per CTS  -continue to wean down HFNC currently on 50/50, if can wean to 40/40 can switch to humidified NC at 6L  -c/w decadron for covid  -c/w diuresus  - patient is on both ipratropium and tiotropium, should discontinue one of them  - please obtain LE duplex for asymmetric edema  -airway clearance: humidified oxygen, duonebs q6h, hyper sal q12h, chest PT q6 h, aerobika/acapella q6 h  -titrate oxygen to O2 >88%, use humidified oxygen  -incentive spirometry, OOB to chair, PT/OT  -aspiration precautions as necessary  -DVT ppx as tolerated

## 2024-02-26 NOTE — SWALLOW BEDSIDE ASSESSMENT ADULT - SLP PERTINENT HISTORY OF CURRENT PROBLEM
Per charting, the patient is a "89M PMHx A. fib on Eliquis, CHF (EF 45-50%, combined systolic and diastolic), CVA, HTN, COPD, CKD3, multiple hospitalizations for CHF exacerbation in the prior months, left sided pleural effusion that was drained last hospitalization, admitted to [facility] on 2/6/2024 for CHF exacerbation and acute on chronic hypoxic respiratory failure 2/2 large left pleural effusion. Patient transferred to [facility] for thoracic surgery eval. Now c/b covid+, on Remdesevir. s/p pigtail by CT surg 2/23. Worsening respiratory status 2/24 now on Bipap"
dysphagia

## 2024-02-26 NOTE — CHART NOTE - NSCHARTNOTEFT_GEN_A_CORE
pt seen and examined several times today  lethargic on high flow nasal cannula    Vital Signs Last 24 Hrs  T(C): 36.4 (26 Feb 2024 09:30), Max: 36.6 (26 Feb 2024 05:30)  T(F): 97.5 (26 Feb 2024 09:30), Max: 97.8 (26 Feb 2024 05:30)  HR: 87 (26 Feb 2024 13:49) (76 - 95)  BP: 142/73 (26 Feb 2024 09:30) (107/64 - 142/73)  BP(mean): --  RR: 18 (26 Feb 2024 13:49) (18 - 20)  SpO2: 100% (26 Feb 2024 13:49) (96% - 100%)    Parameters below as of 26 Feb 2024 13:49  Patient On (Oxygen Delivery Method): nasal cannula, high flow  O2 Flow (L/min): 50  O2 Concentration (%): 50      pt lethargic, on high flow nasal cannual  L PTC on waterseal  no air leak   site oozing - cleaned and new dressing placed, no space for stitch  tube flushed and stripped - draining serosanguinous fluid    Imaging reviewed    ASSESSMENT  89M with PMHx ACarissa meza previously on Eliquis, CHF (EF 45-50%, combined systolic and diastolic), CVA, HTN, COPD, CKD3, multiple hospitalizations for CHF exacerbation in the prior months, left sided pleural effusion that was drained last hospitalization, admitted to [facility] on 2/6/2024 for CHF exacerbation and acute on chronic hypoxic respiratory failure 2/2 large left pleural effusion. TTE on 2/7 showed EF 55-60%, pulmonary hypertension, moderate mitral stenosis. He was evaluated by Pulmonary and had Thoracentesis on 2/8, 1 L bloody output drained.  Pulmonology recommended transfer to [facility] for Pleurex catheter and possible EUS for lymph node biopsy.  Patient placed on IV lasix for diuresis. Patient tested COVID+ on 2/19   L PTC placed  new R sided PNA      PLAN  - Will hold plan for Pleurx catheter placement at this time since pt is COVID +  - s/p Left PTC placement continue tube on waterseal   - medical management of new R sided PNA  - frequent dressing changed to L PTC site as needed  - trend H/H, consider lower PTT goal  - Please call w/ any further concerns to thoracic #[phone]

## 2024-02-26 NOTE — CONSULT NOTE ADULT - CONVERSATION DETAILS
Patient has 2 daughters.  Primary team and palliative discussed with Frida on phone and explained her father's poor prognosis even if he does survive this hospital admission.  He himself is awake and responsive but is unable to understand or make complex medical decisions at this time.  He is  from his wife for the last 56 years.  Daughter Frida is considering making her father DNR/DNI but she wishes to keep him full code until his other daughter arrives from out of state on Wednesday, she is supposed to fly in then and coming straight to hospital from airport according to Frida who also plans to be present. Patient is awake and responsive however unable to make complex medical decisions. When asking him what is happening with him in the hospital, he answers "everything." He does not know the plans. Patient is  and has 2 daughters.  Primary team and palliative discussed with Frida on phone and explained her father's poor prognosis. Daughter thought patient "is stubborn" and "not making an effort" to be come more healthy, he is "fighting against" it. Educated daughter that patient's critical illness is not something her father can control and there is chance of death. Daughter makes decisions with her sister Lora, who is flying to [place] from [place] on Wednesday. Daughter Frida is considering making her father DNR/DNI but she wishes to keep him full code until her sister arrives from out of state on Wednesday, she is supposed to fly in then and coming straight to hospital from airport according to Frida who also plans to be present.

## 2024-02-26 NOTE — PROVIDER CONTACT NOTE (OTHER) - ASSESSMENT
A&Ox4. hiflo 50/50 currently in place. Not in distress. Patient 3 4x4 gauze saturated with sanguineous drainage from chest tube drainage site since 10am dressing change. scant drainage draining into chest tube.

## 2024-02-26 NOTE — CONSULT NOTE ADULT - PROBLEM SELECTOR RECOMMENDATION 9
Bloody Pleural effusion s/p CT with non-expandable lung  # COVID19 + - s/p RDV  # R Pneumonia with mucus plugging - s/p CTX    Recommendations  -Chest tube management and possible future procedures per CTS  - Pulm following  -Continue to wean down HFNC, if can wean to 40/40 can switch to humidified NC at 6L  -C/w decadron for covid  -C/w diuresus  - Aspiration precautions as necessary  - Consider 0.5 mg q4h dilaudid prn for dyspnea if patient having distress from sob  - DVT ppx as tolerated Bloody Pleural effusion s/p CT with non-expandable lung  # COVID19 + - s/p RDV  # R Pneumonia with mucus plugging - s/p CTX    Recommendations  -Chest tube management and possible future procedures per CTS  - Pulm following  -Continue to wean down HFNC, if can wean to 40/40 can switch to humidified NC at 6L  -C/w decadron for covid  -C/w diuresus  - Aspiration precautions as necessary  - Consider 0.5 mg q4h IV dilaudid prn for dyspnea if patient having distress from sob  - DVT ppx as tolerated

## 2024-02-26 NOTE — CONSULT NOTE ADULT - PROBLEM SELECTOR RECOMMENDATION 9
Patient with IRINEO on CKD3, likely due to hypovolemia. BP has been stable, but patient has been in afib, so there may be a hemodynamic component to gradually worsening renal function. Most likely prerenal given afib, given infection on abx, and given poor PO intake. Prolonged cap refill, no LE edema and no JVP on exam point more towards hypovolemia rather than hypervolemia. Would likely benefit from fluids, although would need to be careful given HF hx and multiple admissions for exacerbations  - LR at 100mL/hr for 12 hours, reassess BMP. Will also likely help with hypernatremia, which is likely 2/2 poor PO intake as well  - Repeat BMP in AM  - Patient is urinating, and no obvious signs of retention on exam, although should obtain bladder scan and kidney/bladder US Patient with IRINEO on CKD3, likely due to hypovolemia. BP has been stable, but patient has been in afib, so there may be a hemodynamic component to gradually worsening renal function. Most likely prerenal given afib, given infection on abx, and given poor PO intake. Prolonged cap refill, no LE edema and no JVP on exam point more towards hypovolemia rather than hypervolemia. Would likely benefit from fluids, although would need to be careful given HF hx and multiple admissions for exacerbations  - First, no obvious signs of retention on exam, although should obtain bladder scan. Condom cath for accurate I/Os  - Assuming patient is not retaining, can start NS at 75mL/hr for 12 hours, reassess BMP at that time. Will also likely help with hypernatremia, which is likely 2/2 poor PO intake as well  - Repeat BMP in AM, kidney bladder ultrasound to rule out hydronephrosis Pt. w/ pre-renal IRINEO on CKD in the setting of hypovolemia and infection. Pt. w/ poor po intake and received lasix IV from 2/16-2/21 and 2/24. VS trend stable. On review of [place]/HIE Scr was elevated at 1.61 on 1/5/24. Pt. was hospitalized at [facility] (2/6/24-2/15/24) and Scr remained elevated at 1.52-1.87. On this admission Scr elevated at 1.41 on 2/16. Scr progressively increased to 2.68 today. No documented UOP. Pt. with poor oral intake, appears dry on exam. Obtain renal US and UA. Recommend blader scan to rule out urinary obstruction. If no urinary retention then start NS at 75cc/hr x12hrs. Monitor volume and respiratory status closely. Avoid further use of diuretics. Recommend condom catheter to measure accurate I/O.  Monitor labs and urine output. Avoid nephrotoxins. Dose medication for eGFR Pt. with IRINEO on CKD in the setting of decreased oral intake and infection. Pt. with history of HF, received IV Lasix IV from 2/16-2/21 and on 2/24. On review of [place]/HIE, Scr was elevated at 1.61 on 1/5/24. Pt. was hospitalized at [facility] (2/6/24-2/15/24) and Scr remained elevated between 1.52-1.87. On this admission, Scr elevated at 1.41 on 2/16. Scr progressively increased to 2.6 today. No documented UOP. Pt. with poor oral intake, appears dry on exam. Obtain renal US and UA. Recommend blader scan to rule out urinary obstruction. If no urinary retention then start NS at 75cc/hr x12hrs. Monitor volume and respiratory status closely. Recommend condom catheter to measure UOP.  Monitor labs and urine output. Avoid nephrotoxins. Dose medication for eGFR.

## 2024-02-26 NOTE — CONSULT NOTE ADULT - TIME BILLING
Direct patient Interaction and evaluation, chart review, image review, medical decision making and care coordination
Reviewing the EMR, vitals, imaging, medication list, recent labs, prior records and coordinating care with medical providers. This time excludes procedures and teaching.
Reviewing medical chart and results, symptom assessment and management, counseling patient/decision makers/caregivers, coordination of care, documentation. (separate from Advance Care Planning services)

## 2024-02-26 NOTE — CONSULT NOTE ADULT - PROBLEM SELECTOR RECOMMENDATION 2
- May experience discomfort/pain of left chest wall  - Tylenol 650 mg q6h prn for mild pain  - Consider Dilaudid 0.25 mg q4h prn for moderate pain  - Consider dilaudid 0.5 mg q4h prn for severe pain  - Hold any opioids for hypotension, oversedation, RR<12  - Avoid nephrotoxic agents (morphine, oxy)  - Narcan prn for suspected overdose - May experience discomfort/pain of left chest wall  - Tylenol 650 mg q6h prn for mild pain  - Consider IV Dilaudid 0.25 mg q4h prn for moderate pain  - Consider IV dilaudid 0.5 mg q4h prn for severe pain  - Hold any opioids for hypotension, oversedation, RR<12  - Avoid nephrotoxic agents (morphine, oxy)  - Narcan prn for suspected overdose

## 2024-02-26 NOTE — CONSULT NOTE ADULT - SUBJECTIVE AND OBJECTIVE BOX
HPI:  Patient is a 89M PMHx A. fib previously on Eliquis, CHF (EF 45-50%, combined systolic and diastolic), CVA, HTN, COPD, CKD3, multiple hospitalizations for CHF exacerbation in the prior months, left sided pleural effusion that was drained last hospitalization, admitted to [facility] on 2/6/2024 for CHF exacerbation and acute on chronic hypoxic respiratory failure 2/2 large left pleural effusion. TTE on 2/7 showed EF 55-60%, pulmonary hypertension, moderate mitral stenosis. He was evaluated by Pulmonary and had Thoracentesis on 2/8, 1 L bloody output drained.  Pulmonology recommended transfer to [facility] for Pleurex catheter and possible EUS for lymph node biopsy.  Patient placed on IV lasix for diuresis.  Pt was also found with 2.5 x 1.4 cm right retroareolar breast nodule larger than 9/19/2023.   Surgery was consulted and recommended outpatient follow-up. Patient also found with LLE blister, which is chronic.  Pt also with IRINEO 2/2 CKD3 on this admission, with Hypernatremia and Hypokalemia, K was repleted. Patient transitioned from Eliquis to heparin gtt during admission due to valvular heart disease. Patient was also evaluated by cardiology for moderate mitral stenosis, CHF, and a. fib, was diuresed with IV Lasix, treated with metoprolol, and recommended for structural heart evaluation knowing that patient is a poor candidate for procedural intervention due to comorbidities. Patient was then transferred to [facility].   Of note, patient was recently admitted in 1/2024 presenting with concern for LE blisters, found to have acute on chronic CHF, large exudative pleural effusion s/p 1L removal, transferred from  for thoracic surgery evaluation and was found to be a poor surgical candidate. IR was consulted and recommended that patient had no indication for a pigtail catheter placement due to resolution of dyspnea. Patient was then discharged.   On interview, patient denies chest pain, abdominal pain, reports difficulty urinating, and reports breathing is manageable. Patient endorses productive cough. Patient reports being willing to undergo thoracentesis or PleurX catheter.      PERTINENT PM/SXH:   Afib    Congestive heart failure (CHF)    CVA (cerebral vascular accident)    Hypertension, unspecified type    Chronic obstructive pulmonary disease (COPD)      No significant past surgical history      FAMILY HISTORY:    ------------------------------------------------------------------------------------------------------------  ITEMS NOT CHECKED ARE NOT PRESENT    SOCIAL HISTORY:   Living Situation: [ ]Home  [ ]Long term care  [ ]Rehab [ ]Other  Support:     Substance hx:  [ ]   Tobacco hx:  [ ]   Alcohol hx: [ ]   Family Hx substance abuse [ ]yes [ ]no    Mandaen/Spirituality:  PCSSQ[Palliative Care Spiritual Screening Question]   Severity (0-10): 0  Score of 4 or > indicate consideration of Chaplaincy referral.  Chaplaincy Referral: [ ] yes [X ] refused [ ] following    Anticipatory Grief present?:  [ ] yes [X ] no  [ ] Deferred                      Other Referrals:    [ ]Hospice   [ ]Caregiver [place] Support  [ ]Child Life    [ ]Patient & Family Centered Care Referral  [ ]Holistic Therapy     ------------------------------------------------------------------------------------------------------------    PRESENT SYMPTOMS:     [ ] Unable to self-report      [ ] PAINADS     [ ] RDOS    [ ] No     [ ] Yes     Source if other than patient:  [ ]Family   [ ]Team     PAIN:   If blank, patient unable to specify     [ ]yes [ ]no    1. Location-   2. Radiation-    3. Quality-   4. Timing-   5. Minimal acceptable level/pain goal-   6. Aggravating factors-   7. QOL impact-     SYMPTOMS:   Dyspnea:                           [ ]Mild [ ]Moderate [ ]Severe  Anxiety:                             [ ]Mild [ ]Moderate [ ]Severe  Fatigue:                             [ ]Mild [ ]Moderate [ ]Severe  Nausea/Vomiting:              [ ]Mild [ ]Moderate [ ]Severe  Loss of appetite:                [ ]Mild [ ]Moderate [ ]Severe  Constipation:                     [ ]Mild [ ]Moderate [ ]Severe    Other Symptoms:  [X ]All other review of systems negative     ------------------------------------------------------------------------------------------------------------      I-Stop Reference No:     ------------------------------------------------------------------------------------------------------------    FUNCTIONAL STATUS:     Baseline ADL (prior to admission):  [ ] Independent  [ ] Moderate Assistance [ ] Dependent    Palliative Performance Score (current):     [ ]PPSV2 < or = to 30%   [ ]artificial nutrition      NUTRITIONAL STATUS:     Protein Calorie Malnutrition Present:   [ ]mild   [ ]moderate or severe    Weight:   [ ]underweight (BMI 18.5 or less)   [ ]morbid obesity (BMI 30 or higher)   [ ]anasarca  [ ]significant weight loss     Height (cm): 167.6 (02-16-24 @ 02:40), 167.6 (02-06-24 @ 22:00), 167.6 (12-18-23 @ 09:52)  Weight (kg): 70.3 (02-16-24 @ 02:40), 70.3 (02-06-24 @ 22:00), 74.6 (01-04-24 @ 08:00)  BMI (kg/m2): 25 (02-16-24 @ 02:40), 25 (02-06-24 @ 22:00), 26.6 (01-04-24 @ 08:00)    ------------------------------------------------------------------------------------------------------------    PRIOR ADVANCE DIRECTIVES:    [ ] DNR/MOLST    [ ] Living Will    [ ] Health Care Proxy(s)    DECISION MAKER(s):  [ ] Patient    [ ] Surrogate(s)  [ ] HCP   [ ] Guardian             ------------------------------------------------------------------------------------------------------------  PHYSICAL EXAM:  Vital Signs Last 24 Hrs  T(C): 36.4 (26 Feb 2024 09:30), Max: 36.6 (26 Feb 2024 05:30)  T(F): 97.5 (26 Feb 2024 09:30), Max: 97.8 (26 Feb 2024 05:30)  HR: 85 (26 Feb 2024 09:35) (76 - 95)  BP: 142/73 (26 Feb 2024 09:30) (107/64 - 154/80)  BP(mean): --  RR: 20 (26 Feb 2024 09:35) (18 - 20)  SpO2: 100% (26 Feb 2024 09:35) (96% - 100%)    Parameters below as of 26 Feb 2024 09:35  Patient On (Oxygen Delivery Method): nasal cannula, high flow  O2 Flow (L/min): 50  O2 Concentration (%): 50 I&O's Summary    25 Feb 2024 07:01  -  26 Feb 2024 07:00  --------------------------------------------------------  IN: 0 mL / OUT: 50 mL / NET: -50 mL        GENERAL:  [ ]Cachexia  [ ] Frail  [ ]Awake  [ ]Oriented x   [ ]Lethargic  [ ]Unarousable  [ ]Verbal  [ ]Non-Verbal    BEHAVIORAL:   [ ] Anxiety  [ ] Delirium [ ] Agitation [ ] Other    HEENT:   [ ]Normal   [ ]Dry mouth   [ ]ET Tube/Trach  [ ]Oral lesions    PULMONARY:   [ ]Clear              [ ]Tachypnea  [ ]Audible excessive secretions   [ ]Rhonchi        [ ]Right [ ]Left [ ]Bilateral  [ ]Crackles        [ ]Right [ ]Left [ ]Bilateral  [ ]Wheezing     [ ]Right [ ]Left [ ]Bilateral  [ ]Diminished breath sounds [ ]right [ ]left [ ]bilateral    CARDIOVASCULAR:    [ ]Regular [ ]Irregular [ ]Tachy  [ ]Abdirashid [ ]Murmur [ ]Other    GASTROINTESTINAL:  [ ]Soft  [ ]Distended   [ ]+BS  [ ]Non tender [ ]Tender  [ ]Other [ ]PEG [ ]OGT/ NGT      GENITOURINARY:  [ ]Normal [ ] Incontinent   [ ]Oliguria/Anuria   [ ]Aguiar    MUSCULOSKELETAL:   [ ]Normal   [ ]Weakness  [ ]Bed/Wheelchair bound [ ]Edema    NEUROLOGIC:   [ ]No focal deficits  [ ]Cognitive impairment  [ ]Dysphagia [ ]Dysarthria [ ]Paresis [ ]Other     SKIN:   [ ]Normal  [ ]Rash  [ ]Other  [ ]Pressure ulcer(s)       Present on admission [ ]y [ ]n    ------------------------------------------------------------------------------------------------------------    LABS:                        10.9   16.35 )-----------( 236      ( 26 Feb 2024 03:49 )             34.7   02-26    146<H>  |  99  |  106<H>  ----------------------------<  129<H>  4.1   |  32<H>  |  2.60<H>    Ca    9.0      26 Feb 2024 03:49  Phos  5.5     02-26  Mg     3.10     02-26    PTT - ( 26 Feb 2024 03:49 )  PTT:66.7 sec    Urinalysis Basic - ( 26 Feb 2024 03:49 )    Color: x / Appearance: x / SG: x / pH: x  Gluc: 129 mg/dL / Ketone: x  / Bili: x / Urobili: x   Blood: x / Protein: x / Nitrite: x   Leuk Esterase: x / RBC: x / WBC x   Sq Epi: x / Non Sq Epi: x / Bacteria: x        ------------------------------------------------------------------------------------------------------------    CRITICAL CARE:  [ ]Shock Present  [ ]Septic [ ]Cardiogenic [ ]Neurologic [ ]Hypovolemic [ ] Undifferentiated/Mixed   [ ]Vasopressors [ ]Inotropes     [ ]Respiratory failure present: [ ]Acute  [ ]Chronic [ ]Hypoxic  [ ]Hypercarbic   [ ]Mechanical ventilation   [ ]Trach collar   [ ]Non-invasive ventilatory support   [ ]High flow    [ ]Non-rebreather/Venti     [ ]Other organ failure     ------------------------------------------------------------------------------------------------------------    RADIOLOGY & ADDITIONAL STUDIES:      ------------------------------------------------------------------------------------------------------------    ALLERGIES:  Allergies    No Known Allergies    Intolerances    MEDICATIONS  (STANDING):  albuterol    90 MICROgram(s) HFA Inhaler 2 Puff(s) Inhalation every 6 hours  allopurinol 100 milliGRAM(s) Oral daily  aspirin enteric coated 81 milliGRAM(s) Oral daily  atorvastatin 40 milliGRAM(s) Oral at bedtime  budesonide 160 MICROgram(s)/formoterol 4.5 MICROgram(s) Inhaler 2 Puff(s) Inhalation two times a day  dexAMETHasone  Injectable 6 milliGRAM(s) IV Push daily  heparin  Infusion. 800 Unit(s)/Hr (8 mL/Hr) IV Continuous <Continuous>  hydrocodone/homatropine Syrup 5 milliLiter(s) Oral every 6 hours  ipratropium 17 MICROgram(s) HFA Inhaler 1 Puff(s) Inhalation every 6 hours  metoprolol succinate ER 50 milliGRAM(s) Oral daily  pantoprazole    Tablet 40 milliGRAM(s) Oral before breakfast  piperacillin/tazobactam IVPB.. 3.375 Gram(s) IV Intermittent every 12 hours  polyethylene glycol 3350 17 Gram(s) Oral two times a day  senna 2 Tablet(s) Oral at bedtime  sodium chloride 0.65% Nasal 1 Spray(s) Both Nostrils two times a day  sodium chloride 0.9% for Nebulization 3 milliLiter(s) Nebulizer every 6 hours  tiotropium 2.5 MICROgram(s) Inhaler 2 Puff(s) Inhalation daily    MEDICATIONS  (PRN):  acetaminophen     Tablet .. 650 milliGRAM(s) Oral every 6 hours PRN Temp greater or equal to 38C (100.4F), Mild Pain (1 - 3)           HPI:  Patient is a 89M PMHx NANCY meza previously on Eliquis, CHF (EF 45-50%, combined systolic and diastolic), CVA, HTN, COPD, CKD3, multiple hospitalizations for CHF exacerbation in the prior months, left sided pleural effusion that was drained last hospitalization, admitted to [facility] on 2/6/2024 for CHF exacerbation and acute on chronic hypoxic respiratory failure 2/2 large left pleural effusion. He was evaluated by Pulmonary and had Thoracentesis on 2/8, 1 L bloody output drained.  Pulmonology recommended transfer to [facility] for Pleurex catheter and possible EUS for lymph node biopsy.  Patient placed on IV lasix for diuresis.  Pt was also found with 2.5 x 1.4 cm right retroareolar breast nodule larger than 9/19/2023.   Surgery was consulted and recommended outpatient follow-up. Pt also with IRINEO 2/2 CKD3 on this admission. Patient transitioned from Eliquis to heparin gtt during admission due to valvular heart disease. Patient was also evaluated by cardiology for moderate mitral stenosis, CHF, and a. fib, was diuresed with IV Lasix, treated with metoprolol, and recommended for structural heart evaluation knowing that patient is a poor candidate for procedural intervention due to comorbidities. Patient was then transferred to [facility] for pleurx catheter.  He was found to be COVID + on 2/19 and is s/p left chest tube insertion by thoracic on 2/23 with serosanguinous drainage (nonmalignant).  He has received remdesvir, steroids, and zosyn but continues to be dependent on HFNC with ongoing pleural drainage.  Palliative consulted for GOC.     PERTINENT PM/SXH:   Afib    Congestive heart failure (CHF)    CVA (cerebral vascular accident)    Hypertension, unspecified type    Chronic obstructive pulmonary disease (COPD)      No significant past surgical history      FAMILY HISTORY:    ------------------------------------------------------------------------------------------------------------  ITEMS NOT CHECKED ARE NOT PRESENT    SOCIAL HISTORY:   Living Situation: [ ]Home  [ ]Long term care  [x ]Rehab [ ]Other  Support:     Substance hx:  [ ]   Tobacco hx:  [x ]   Alcohol hx: [ ]   Family Hx substance abuse [x ]yes [ ]no    Anabaptist/Spirituality:  PCSSQ[Palliative Care Spiritual Screening Question]   Severity (0-10): 0  Score of 4 or > indicate consideration of Chaplaincy referral.  Chaplaincy Referral: [ ] yes [X ] refused [ ] following    Anticipatory Grief present?:  [x ] yes [ ] no  [ ] Deferred                      Other Referrals:    [ ]Hospice   [ ]Caregiver [place] Support  [ ]Child Life    [ ]Patient & Family Centered Care Referral  [ ]Holistic Therapy     ------------------------------------------------------------------------------------------------------------    PRESENT SYMPTOMS:     [ ] Unable to self-report      [ ] PAINADS     [ ] RDOS    [x ] No     [ ] Yes     Source if other than patient:  [ ]Family   [ ]Team     PAIN:   If blank, patient unable to specify     [ ]yes [x ]no    1. Location-   2. Radiation-    3. Quality-   4. Timing-   5. Minimal acceptable level/pain goal-   6. Aggravating factors-   7. QOL impact-     SYMPTOMS:   Dyspnea:                           [ ]Mild [ ]Moderate [x ]Severe  Anxiety:                             [ ]Mild [ ]Moderate [ ]Severe  Fatigue:                             [ ]Mild [ ]Moderate [ ]Severe  Nausea/Vomiting:              [ ]Mild [ ]Moderate [ ]Severe  Loss of appetite:                [ ]Mild [ ]Moderate [ ]Severe  Constipation:                     [ ]Mild [ ]Moderate [ ]Severe    Other Symptoms:  [X ]All other review of systems negative     ------------------------------------------------------------------------------------------------------------      I-Stop Reference No: [id number]    ------------------------------------------------------------------------------------------------------------    FUNCTIONAL STATUS:     Baseline ADL (prior to admission):  [ ] Independent  [ ] Moderate Assistance [x] Dependent    Palliative Performance Score (current):     [x]PPSV2 < or = to 30%   [ ]artificial nutrition      NUTRITIONAL STATUS:     Protein Calorie Malnutrition Present:   [ ]mild   [ ]moderate or severe    Weight:   [ ]underweight (BMI 18.5 or less)   [ ]morbid obesity (BMI 30 or higher)   [ ]anasarca  [ ]significant weight loss     Height (cm): 167.6 (02-16-24 @ 02:40), 167.6 (02-06-24 @ 22:00), 167.6 (12-18-23 @ 09:52)  Weight (kg): 70.3 (02-16-24 @ 02:40), 70.3 (02-06-24 @ 22:00), 74.6 (01-04-24 @ 08:00)  BMI (kg/m2): 25 (02-16-24 @ 02:40), 25 (02-06-24 @ 22:00), 26.6 (01-04-24 @ 08:00)    ------------------------------------------------------------------------------------------------------------    PRIOR ADVANCE DIRECTIVES:    [ ] DNR/MOLST    [ ] Living Will    [ ] Health Care Proxy(s)    DECISION MAKER(s):  [ ] Patient    [x ] Surrogate(s) Daughter Frida Escoto  [ ] HCP   [ ] Guardian             ------------------------------------------------------------------------------------------------------------  PHYSICAL EXAM:  Vital Signs Last 24 Hrs  T(C): 36.4 (26 Feb 2024 09:30), Max: 36.6 (26 Feb 2024 05:30)  T(F): 97.5 (26 Feb 2024 09:30), Max: 97.8 (26 Feb 2024 05:30)  HR: 85 (26 Feb 2024 09:35) (76 - 95)  BP: 142/73 (26 Feb 2024 09:30) (107/64 - 154/80)  BP(mean): --  RR: 20 (26 Feb 2024 09:35) (18 - 20)  SpO2: 100% (26 Feb 2024 09:35) (96% - 100%)    Parameters below as of 26 Feb 2024 09:35  Patient On (Oxygen Delivery Method): nasal cannula, high flow  O2 Flow (L/min): 50  O2 Concentration (%): 50 I&O's Summary    25 Feb 2024 07:01  -  26 Feb 2024 07:00  --------------------------------------------------------  IN: 0 mL / OUT: 50 mL / NET: -50 mL        GENERAL:  [ ]Cachexia  [x ] Frail  [x ]Awake  [ ]Oriented x   [ ]Lethargic  [ ]Unarousable  [x ]Verbal  [ ]Non-Verbal    BEHAVIORAL:   [ ] Anxiety  [ ] Delirium [ ] Agitation [ ] Other    HEENT:   [x ]Normal   [ ]Dry mouth   [ ]ET Tube/Trach  [ ]Oral lesions    PULMONARY:   [ ]Clear              [ ]Tachypnea  [ ]Audible excessive secretions   [ ]Rhonchi        [ ]Right [ ]Left [ ]Bilateral  [ ]Crackles        [ ]Right [ ]Left [ ]Bilateral  [ ]Wheezing     [ ]Right [ ]Left [ ]Bilateral  [x ]Diminished breath sounds [ ]right [x ]left [ ]bilateral L CHEST WALL TUBE DRAINING SEROSANGUINOUS FLUID    CARDIOVASCULAR:    [ ]Regular [x ]Irregular [ ]Tachy  [ ]Abdirashid [ ]Murmur [ ]Other    GASTROINTESTINAL:  [x ]Soft  [ ]Distended   [ ]+BS  [ ]Non tender [ ]Tender  [ ]Other [ ]PEG [ ]OGT/ NGT      GENITOURINARY:  [ ]Normal [x ] Incontinent   [ ]Oliguria/Anuria   [ ]Aguiar    MUSCULOSKELETAL:   [ ]Normal   [x ]Weakness  [ ]Bed/Wheelchair bound [ ]Edema left leg edema > right    NEUROLOGIC:   [X]No focal deficits  [ ]Cognitive impairment  [ ]Dysphagia [ ]Dysarthria [ ]Paresis [ ]Other     SKIN:   [X ]Normal  [ ]Rash  [ ]Other RN NOTE ON SKIN REVIEWED IN CHART  [ ]Pressure ulcer(s)       Present on admission [ ]y [ ]n    ------------------------------------------------------------------------------------------------------------    LABS:                        10.9   16.35 )-----------( 236      ( 26 Feb 2024 03:49 )             34.7   02-26    146<H>  |  99  |  106<H>  ----------------------------<  129<H>  4.1   |  32<H>  |  2.60<H>    Ca    9.0      26 Feb 2024 03:49  Phos  5.5     02-26  Mg     3.10     02-26    PTT - ( 26 Feb 2024 03:49 )  PTT:66.7 sec    Urinalysis Basic - ( 26 Feb 2024 03:49 )    Color: x / Appearance: x / SG: x / pH: x  Gluc: 129 mg/dL / Ketone: x  / Bili: x / Urobili: x   Blood: x / Protein: x / Nitrite: x   Leuk Esterase: x / RBC: x / WBC x   Sq Epi: x / Non Sq Epi: x / Bacteria: x        ------------------------------------------------------------------------------------------------------------    CRITICAL CARE:  [ ]Shock Present  [ ]Septic [ ]Cardiogenic [ ]Neurologic [ ]Hypovolemic [ ] Undifferentiated/Mixed   [ ]Vasopressors [ ]Inotropes     [X ]Respiratory failure present: [ ]Acute  [ ]Chronic [ ]Hypoxic  [ ]Hypercarbic   [ ]Mechanical ventilation   [ ]Trach collar   [ ]Non-invasive ventilatory support   [X ]High flow    [ ]Non-rebreather/Venti     [ ]Other organ failure     ------------------------------------------------------------------------------------------------------------    RADIOLOGY & ADDITIONAL STUDIES:    ACC: [id number] EXAM:  XR CHEST PORTABLE ROUTINE 1V   ORDERED BY: CARLOS ANGELA     PROCEDURE DATE:  02/24/2024      INTERPRETATION:  AP chest.    Clinical indications: Pleural effusion.    IMPRESSION: The cardiac silhouette is enlarged. Thereis left-sided chest   tube. There is unchanged left pneumothorax with diffuse left lung   airspace disease and a small left pleural effusion. There is a right mid   lung patchy opacity compatible with atelectasis or pneumonia. There is a   small right pleural effusion. Overall there is no significant change.    --- End of Report ---      ------------------------------------------------------------------------------------------------------------    ALLERGIES:  Allergies    No Known Allergies    Intolerances    MEDICATIONS  (STANDING):  albuterol    90 MICROgram(s) HFA Inhaler 2 Puff(s) Inhalation every 6 hours  allopurinol 100 milliGRAM(s) Oral daily  aspirin enteric coated 81 milliGRAM(s) Oral daily  atorvastatin 40 milliGRAM(s) Oral at bedtime  budesonide 160 MICROgram(s)/formoterol 4.5 MICROgram(s) Inhaler 2 Puff(s) Inhalation two times a day  dexAMETHasone  Injectable 6 milliGRAM(s) IV Push daily  heparin  Infusion. 800 Unit(s)/Hr (8 mL/Hr) IV Continuous <Continuous>  hydrocodone/homatropine Syrup 5 milliLiter(s) Oral every 6 hours  ipratropium 17 MICROgram(s) HFA Inhaler 1 Puff(s) Inhalation every 6 hours  metoprolol succinate ER 50 milliGRAM(s) Oral daily  pantoprazole    Tablet 40 milliGRAM(s) Oral before breakfast  piperacillin/tazobactam IVPB.. 3.375 Gram(s) IV Intermittent every 12 hours  polyethylene glycol 3350 17 Gram(s) Oral two times a day  senna 2 Tablet(s) Oral at bedtime  sodium chloride 0.65% Nasal 1 Spray(s) Both Nostrils two times a day  sodium chloride 0.9% for Nebulization 3 milliLiter(s) Nebulizer every 6 hours  tiotropium 2.5 MICROgram(s) Inhaler 2 Puff(s) Inhalation daily    MEDICATIONS  (PRN):  acetaminophen     Tablet .. 650 milliGRAM(s) Oral every 6 hours PRN Temp greater or equal to 38C (100.4F), Mild Pain (1 - 3)       HPI:  Patient is a 89M PMHx NANCY meza previously on Eliquis, CHF (EF 45-50%, combined systolic and diastolic), CVA, HTN, COPD, CKD3, multiple hospitalizations for CHF exacerbation in the prior months, left sided pleural effusion that was drained last hospitalization, admitted to [facility] on 2/6/2024 for CHF exacerbation and acute on chronic hypoxic respiratory failure 2/2 large left pleural effusion. He was evaluated by Pulmonary and had Thoracentesis on 2/8, 1 L bloody output drained.  Pulmonology recommended transfer to [facility] for Pleurex catheter and possible EUS for lymph node biopsy.  Patient placed on IV lasix for diuresis.  Pt was also found with 2.5 x 1.4 cm right retroareolar breast nodule larger than 9/19/2023.   Surgery was consulted and recommended outpatient follow-up. Pt also with IRINEO 2/2 CKD3 on this admission. Patient transitioned from Eliquis to heparin gtt during admission due to valvular heart disease. Patient was also evaluated by cardiology for moderate mitral stenosis, CHF, and a. fib, was diuresed with IV Lasix, treated with metoprolol, and recommended for structural heart evaluation knowing that patient is a poor candidate for procedural intervention due to comorbidities. Patient was then transferred to [facility] for pleurx catheter.  He was found to be COVID + on 2/19 and is s/p left chest tube insertion by thoracic on 2/23 with serosanguinous drainage (nonmalignant).  He has received remdesvir, steroids, and zosyn but continues to be dependent on HFNC with ongoing pleural drainage.  Palliative consulted for GOC.     PERTINENT PM/SXH:   Afib    Congestive heart failure (CHF)    CVA (cerebral vascular accident)    Hypertension, unspecified type    Chronic obstructive pulmonary disease (COPD)      No significant past surgical history      FAMILY HISTORY:    ------------------------------------------------------------------------------------------------------------  ITEMS NOT CHECKED ARE NOT PRESENT    SOCIAL HISTORY:   Living Situation: [ ]Home  [ ]Long term care  [x ]Rehab [ ]Other  Support: Daughters, girlfriend    Substance hx:  [ ]   Tobacco hx:  [x ]   Alcohol hx: [ ]   Family Hx substance abuse [x ]yes [ ]no    Zoroastrianism/Spirituality:  PCSSQ[Palliative Care Spiritual Screening Question]   Severity (0-10): 0  Score of 4 or > indicate consideration of Chaplaincy referral.  Chaplaincy Referral: [ ] yes [X ] refused [ ] following    Anticipatory Grief present?:  [x ] yes [ ] no  [ ] Deferred                      Other Referrals:    [ ]Hospice   [ ]Caregiver [place] Support  [ ]Child Life    [ ]Patient & Family Centered Care Referral  [ ]Holistic Therapy     ------------------------------------------------------------------------------------------------------------    PRESENT SYMPTOMS:     [X ] Unable to self-report      [X ] PAINADS     [X ] RDOS    [ ] No     [ ] Yes     Source if other than patient:  [ ]Family   [ ]Team     PAIN:   If blank, patient unable to specify     [ ]yes [x ]no    1. Location-   2. Radiation-    3. Quality-   4. Timing-   5. Minimal acceptable level/pain goal-   6. Aggravating factors-   7. QOL impact-     SYMPTOMS:   Dyspnea:                           [ ]Mild [ ]Moderate [x ]Severe  Anxiety:                             [ ]Mild [ ]Moderate [ ]Severe  Fatigue:                             [ ]Mild [ ]Moderate [ ]Severe  Nausea/Vomiting:              [ ]Mild [ ]Moderate [ ]Severe  Loss of appetite:                [ ]Mild [ ]Moderate [ ]Severe  Constipation:                     [ ]Mild [ ]Moderate [ ]Severe    Other Symptoms:  [X ]All other review of systems negative     ------------------------------------------------------------------------------------------------------------      [employer] Reference No: [id number]    ------------------------------------------------------------------------------------------------------------    FUNCTIONAL STATUS:     Baseline ADL (prior to admission):  [ ] Independent  [ ] Moderate Assistance [x] Dependent    Palliative Performance Score (current):     [x]PPSV2 < or = to 30%   [ ]artificial nutrition      NUTRITIONAL STATUS:     Protein Calorie Malnutrition Present:   [ ]mild   [ ]moderate or severe    Weight:   [ ]underweight (BMI 18.5 or less)   [ ]morbid obesity (BMI 30 or higher)   [ ]anasarca  [ ]significant weight loss     Height (cm): 167.6 (02-16-24 @ 02:40), 167.6 (02-06-24 @ 22:00), 167.6 (12-18-23 @ 09:52)  Weight (kg): 70.3 (02-16-24 @ 02:40), 70.3 (02-06-24 @ 22:00), 74.6 (01-04-24 @ 08:00)  BMI (kg/m2): 25 (02-16-24 @ 02:40), 25 (02-06-24 @ 22:00), 26.6 (01-04-24 @ 08:00)    ------------------------------------------------------------------------------------------------------------    PRIOR ADVANCE DIRECTIVES:    [ ] DNR/MOLST    [ ] Living Will    [ ] Health Care Proxy(s)    DECISION MAKER(s):  [ ] Patient    [x ] Surrogate(s) Ronnie Escoto  [ ] HCP   [ ] Guardian             ------------------------------------------------------------------------------------------------------------  PHYSICAL EXAM:  Vital Signs Last 24 Hrs  T(C): 36.4 (26 Feb 2024 09:30), Max: 36.6 (26 Feb 2024 05:30)  T(F): 97.5 (26 Feb 2024 09:30), Max: 97.8 (26 Feb 2024 05:30)  HR: 85 (26 Feb 2024 09:35) (76 - 95)  BP: 142/73 (26 Feb 2024 09:30) (107/64 - 154/80)  BP(mean): --  RR: 20 (26 Feb 2024 09:35) (18 - 20)  SpO2: 100% (26 Feb 2024 09:35) (96% - 100%)    Parameters below as of 26 Feb 2024 09:35  Patient On (Oxygen Delivery Method): nasal cannula, high flow  O2 Flow (L/min): 50  O2 Concentration (%): 50 I&O's Summary    25 Feb 2024 07:01  -  26 Feb 2024 07:00  --------------------------------------------------------  IN: 0 mL / OUT: 50 mL / NET: -50 mL    GENERAL:  [ ]Cachexia  [x ] Frail  [x ]Awake  [ ]Oriented x   [ ]Lethargic  [ ]Unarousable  [x ]Verbal  [ ]Non-Verbal    BEHAVIORAL:   [ ] Anxiety  [ ] Delirium [ ] Agitation [ ] Other    HEENT:   [x ]Normal   [ ]Dry mouth   [ ]ET Tube/Trach  [ ]Oral lesions    PULMONARY:   [ ]Clear              [ ]Tachypnea  [ ]Audible excessive secretions   [ ]Rhonchi        [ ]Right [ ]Left [ ]Bilateral  [ ]Crackles        [ ]Right [ ]Left [ ]Bilateral  [ ]Wheezing     [ ]Right [ ]Left [ ]Bilateral  [x ]Diminished breath sounds [ ]right [x ]left [ ]bilateral L CHEST WALL TUBE DRAINING SEROSANGUINOUS FLUID    CARDIOVASCULAR:    [ ]Regular [x ]Irregular [ ]Tachy  [ ]Abdirashid [ ]Murmur [ ]Other    GASTROINTESTINAL:  [x ]Soft  [ ]Distended   [ ]+BS  [ ]Non tender [ ]Tender  [ ]Other [ ]PEG [ ]OGT/ NGT      GENITOURINARY:  [ ]Normal [x ] Incontinent   [ ]Oliguria/Anuria   [ ]Aguiar    MUSCULOSKELETAL:   [ ]Normal   [x ]Weakness  [ ]Bed/Wheelchair bound [ ]Edema left leg edema > right    NEUROLOGIC:   [X]No focal deficits  [ ]Cognitive impairment  [ ]Dysphagia [ ]Dysarthria [ ]Paresis [ ]Other     SKIN:   [X ]Normal  [ ]Rash  [ ]Other RN NOTE ON SKIN REVIEWED IN CHART  [ ]Pressure ulcer(s)       Present on admission [ ]y [ ]n    ------------------------------------------------------------------------------------------------------------    LABS:                        10.9   16.35 )-----------( 236      ( 26 Feb 2024 03:49 )             34.7   02-26    146<H>  |  99  |  106<H>  ----------------------------<  129<H>  4.1   |  32<H>  |  2.60<H>    Ca    9.0      26 Feb 2024 03:49  Phos  5.5     02-26  Mg     3.10     02-26    PTT - ( 26 Feb 2024 03:49 )  PTT:66.7 sec    Urinalysis Basic - ( 26 Feb 2024 03:49 )    Color: x / Appearance: x / SG: x / pH: x  Gluc: 129 mg/dL / Ketone: x  / Bili: x / Urobili: x   Blood: x / Protein: x / Nitrite: x   Leuk Esterase: x / RBC: x / WBC x   Sq Epi: x / Non Sq Epi: x / Bacteria: x    ------------------------------------------------------------------------------------------------------------    CRITICAL CARE:  [ ]Shock Present  [ ]Septic [ ]Cardiogenic [ ]Neurologic [ ]Hypovolemic [ ] Undifferentiated/Mixed   [ ]Vasopressors [ ]Inotropes     [X ]Respiratory failure present: [ ]Acute  [ ]Chronic [ ]Hypoxic  [ ]Hypercarbic   [ ]Mechanical ventilation   [ ]Trach collar   [ ]Non-invasive ventilatory support   [X ]High flow    [ ]Non-rebreather/Venti     [ ]Other organ failure     ------------------------------------------------------------------------------------------------------------    RADIOLOGY & ADDITIONAL STUDIES:    ACC: [id number] EXAM:  XR CHEST PORTABLE ROUTINE 1V   ORDERED BY: CARLOS ANGELA     PROCEDURE DATE:  02/24/2024      INTERPRETATION:  AP chest.    Clinical indications: Pleural effusion.    IMPRESSION: The cardiac silhouette is enlarged. Thereis left-sided chest   tube. There is unchanged left pneumothorax with diffuse left lung   airspace disease and a small left pleural effusion. There is a right mid   lung patchy opacity compatible with atelectasis or pneumonia. There is a   small right pleural effusion. Overall there is no significant change.    --- End of Report ---    ------------------------------------------------------------------------------------------------------------    ALLERGIES:  Allergies    No Known Allergies    Intolerances    MEDICATIONS  (STANDING):  albuterol    90 MICROgram(s) HFA Inhaler 2 Puff(s) Inhalation every 6 hours  allopurinol 100 milliGRAM(s) Oral daily  aspirin enteric coated 81 milliGRAM(s) Oral daily  atorvastatin 40 milliGRAM(s) Oral at bedtime  budesonide 160 MICROgram(s)/formoterol 4.5 MICROgram(s) Inhaler 2 Puff(s) Inhalation two times a day  dexAMETHasone  Injectable 6 milliGRAM(s) IV Push daily  heparin  Infusion. 800 Unit(s)/Hr (8 mL/Hr) IV Continuous <Continuous>  hydrocodone/homatropine Syrup 5 milliLiter(s) Oral every 6 hours  ipratropium 17 MICROgram(s) HFA Inhaler 1 Puff(s) Inhalation every 6 hours  metoprolol succinate ER 50 milliGRAM(s) Oral daily  pantoprazole    Tablet 40 milliGRAM(s) Oral before breakfast  piperacillin/tazobactam IVPB.. 3.375 Gram(s) IV Intermittent every 12 hours  polyethylene glycol 3350 17 Gram(s) Oral two times a day  senna 2 Tablet(s) Oral at bedtime  sodium chloride 0.65% Nasal 1 Spray(s) Both Nostrils two times a day  sodium chloride 0.9% for Nebulization 3 milliLiter(s) Nebulizer every 6 hours  tiotropium 2.5 MICROgram(s) Inhaler 2 Puff(s) Inhalation daily    MEDICATIONS  (PRN):  acetaminophen     Tablet .. 650 milliGRAM(s) Oral every 6 hours PRN Temp greater or equal to 38C (100.4F), Mild Pain (1 - 3)

## 2024-02-26 NOTE — PROGRESS NOTE ADULT - PROBLEM SELECTOR PLAN 2
worsening infiltrates on CXR despite Abx, Remdesevir  -now on HFNC 50/50  -CTX 2/20-, changed to Zosyn 2/24  -Sputum cx: normal blaine  -urine legionella negative  -s/p Remdesevir   -c/w decadron as above

## 2024-02-26 NOTE — PROGRESS NOTE ADULT - SUBJECTIVE AND OBJECTIVE BOX
PULMONARY PROGRESS NOTE    PATIENT INFORMATION:  NAME: RAD SINGLETON:  MRN: [id number]    CHIEF COMPLAINT: Patient is a 89y old  Male who presents with a chief complaint of Transfer for PleurX (26 Feb 2024 11:59)      [x] INITIAL CONSULT, H&P, FAMILY HISTORY and PAST MEDICAL AND SURGICAL HISTORY REVIEWED    OVERNIGHT EVENTS, CHANGES TO HPI, SUBJECTIVE:   - Patient seen and examined at bedside  - No overnight events  - Symptoms stable/improving  - SpO2 98% on HFNC 50/50    ========================REVIEW OF SYSTEMS========================  [x] ROS negative except as per HPI    ========================MEDICATIONS=============================  NEURO    CARDIO  metoprolol succinate ER 50 milliGRAM(s) Oral daily    PULM  albuterol    90 MICROgram(s) HFA Inhaler 2 Puff(s) Inhalation every 6 hours  budesonide 160 MICROgram(s)/formoterol 4.5 MICROgram(s) Inhaler 2 Puff(s) Inhalation two times a day  hydrocodone/homatropine Syrup 5 milliLiter(s) Oral every 6 hours  ipratropium 17 MICROgram(s) HFA Inhaler 1 Puff(s) Inhalation every 6 hours  sodium chloride 0.9% for Nebulization 3 milliLiter(s) Nebulizer every 6 hours  tiotropium 2.5 MICROgram(s) Inhaler 2 Puff(s) Inhalation daily    FEN/GI  pantoprazole    Tablet 40 milliGRAM(s) Oral before breakfast  polyethylene glycol 3350 17 Gram(s) Oral two times a day  senna 2 Tablet(s) Oral at bedtime    HEME/ONC  aspirin enteric coated 81 milliGRAM(s) Oral daily  heparin  Infusion. 800 Unit(s)/Hr IV Continuous <Continuous>    ANTIMICROBIALS  piperacillin/tazobactam IVPB.. 3.375 Gram(s) IV Intermittent every 12 hours    ENDO  allopurinol 100 milliGRAM(s) Oral daily  atorvastatin 40 milliGRAM(s) Oral at bedtime  dexAMETHasone  Injectable 6 milliGRAM(s) IV Push daily    OTHER  sodium chloride 0.65% Nasal 1 Spray(s) Both Nostrils two times a day      PRN      ========================PHYSICAL EXAM============================    VITALS: Vital Signs Last 24 Hrs  T(C): 36.4 (26 Feb 2024 09:30), Max: 36.6 (26 Feb 2024 05:30)  T(F): 97.5 (26 Feb 2024 09:30), Max: 97.8 (26 Feb 2024 05:30)  HR: 85 (26 Feb 2024 09:35) (76 - 95)  BP: 142/73 (26 Feb 2024 09:30) (107/64 - 154/80)  BP(mean): --  RR: 20 (26 Feb 2024 09:35) (18 - 20)  SpO2: 100% (26 Feb 2024 09:35) (96% - 100%)    Parameters below as of 26 Feb 2024 09:35  Patient On (Oxygen Delivery Method): nasal cannula, high flow  O2 Flow (L/min): 50  O2 Concentration (%): 50    INTAKE and OUTPUT: I&O's Detail    25 Feb 2024 07:01  -  26 Feb 2024 07:00  --------------------------------------------------------  IN:  Total IN: 0 mL    OUT:    Chest Tube (mL): 50 mL  Total OUT: 50 mL    Total NET: -50 mL          VENTILATOR SETTINGS:     Physical Exam  CONST:     NAD; thin; appears stated age  ENMT:       MMM, no pharyngeal injection or exudates; oropharynx not crowded   RESP :        Poor respiratory effort; CTA bilaterally; no W/R/R  CHEST:       No TTP, no lines/ports; symmetric chest expansion  CARDIO:     RRR, normal S1 and S2, no M/R/G  VASC:         No JVD, R sided LE edema  ABD:           Soft, nontender nondistended  MSK:          No clubbing of digits  NEURO:     Non-focal; no gross sensory deficits; moving all extremities   SKIN:          No rashes; no palpable lesions       ======================LABORATORY RESULTS AND IMAGING=============                        10.9   16.35 )-----------( 236      ( 26 Feb 2024 03:49 )             34.7                                  02-26    146<H>  |  99  |  106<H>  ----------------------------<  129<H>  4.1   |  32<H>  |  2.60<H>    Ca    9.0      26 Feb 2024 03:49  Phos  5.5     02-26  Mg     3.10     02-26        ABG Trend  02-24-24 @ 15:55 [id number]  - 7.39/57/89/34/98.0 Lactate --  10-16-23 @ 05:30 RdY257.0  - 7.46/44/72/31/95.4 Lactate --  12-17-22 @ 10:35 YtN318.0  - 7.37/45/87/26/97.0 Lactate --  09-30-19 @ 04:38 [id number]%  - 7.41/35/305/--/>99 Lactate --  09-30-19 @ 01:28 [id number]%  - 7.18/57/103/--/95 Lactate --      Creatinine Trend: 2.60<--, 2.45<--, 2.30<--, 2.17<--, 1.89<--, 1.78<--    [x] RADIOLOGY REVIEWED AND INTERPRETED BY ME

## 2024-02-26 NOTE — CHART NOTE - NSCHARTNOTESELECT_GEN_ALL_CORE
Problem: DISCHARGE PLANNING - CARE MANAGEMENT  Goal: Discharge to post-acute care or home with appropriate resources  INTERVENTIONS:  - Conduct assessment to determine patient/family and health care team treatment goals, and need for post-acute services based on payer coverage, community resources, and patient preferences, and barriers to discharge  - Address psychosocial, clinical, and financial barriers to discharge as identified in assessment in conjunction with the patient/family and health care team  - Arrange appropriate level of post-acute services according to patient's   needs and preference and payer coverage in collaboration with the physician and health care team  - Communicate with and update the patient/family, physician, and health care team regarding progress on the discharge plan  - Arrange appropriate transportation to post-acute venues  - Discharge to home when medically cleared  Outcome: Progressing Event Note

## 2024-02-26 NOTE — CHART NOTE - NSCHARTNOTEFT_GEN_A_CORE
Patient's pigtail catheter still noted to be bleeding. Thoracic called again, recommends stat CBC and and PTT. Hgb 10.1 stable and PTT stable 59.9. Discussed with attending Dr. Downing, will start patient on patient specific heparin drip with goal PTT of 40-60. RN made aware of plan.

## 2024-02-26 NOTE — CONSULT NOTE ADULT - ASSESSMENT
89M hx of afib on Eliquis, HFpEF EF most recently 55-60% with pHTN, and CKD3 last seen by nephrology in January 2023 by Dr. Pineda here for PNA and L sided pleural effusion requiring abx and L sided chest tube. Ccb COVID requiring remdesivir and dexamethasone. Consulted to nephrology for IRINEO on CKD. Pt. w/ IRINEO on CKD. Pt. with IRINEO on CKD.

## 2024-02-26 NOTE — PROGRESS NOTE ADULT - ATTENDING COMMENTS
89-year-old man with a past medical history of A-fib on apixaban, HFrEF EF 45, CVA, COPD, CKD 3 presents with acute on chronic hypoxic respiratory failure in the setting of COPD and heart failure exacerbation.  He was also found to have a left-sided pleural effusion.  He underwent thoracentesis on February 8 with 1 L bloody drainage.  He was transferred to [facility] for pleurX evaluation and possible EBUS.  Course further complicated by COVID-pneumonia infection as well as likely superimposed bacterial pneumonia with mucous plugging.    -Now status post chest tube placement by CT surgery  -Continues on high flow nasal cannula wean as tolerated once on 40/40 switch to nasal cannula 6 L  -Continue Decadron for COVID, continue diuresis  -Continue airway clearance as above

## 2024-02-26 NOTE — CHART NOTE - NSCHARTNOTEFT_GEN_A_CORE
RRT called this morning for hypoxia to 60s while on HFNC, poor waveform. By the time RRT team and I arrived, SpO2 was % with good pleth after switching the pulse ox. No changes made to HFNC. Chest tube is sutured in place, however oozing. Thoracic surgery called to assess patient at bedside. They stated they changed the dressing. Dressing still noted to be saturated by writer. Discussed with thoracic surgery, recommends to continue more frequent dressing changes. RN made aware of plan.

## 2024-02-26 NOTE — PROGRESS NOTE ADULT - SUBJECTIVE AND OBJECTIVE BOX
Cardiovascular Disease Progress Note  Date of Service: 24 @ 10:58    Overnight events: Hypoxia noted overnight.    The patient is in no distress on HFNC.        Objective Findings:  T(C): 36.4 (24 @ 09:30), Max: 36.6 (24 @ 05:30)  HR: 84 (24 @ 09:30) (76 - 95)  BP: 142/73 (24 @ 09:30) (107/64 - 154/80)  RR: 18 (24 @ 09:30) (18 - 18)  SpO2: 99% (24 @ 09:30) (96% - 99%)  Wt(kg): --  Daily     Daily Weight in k.8 (2024 05:30)      Physical Exam:  Gen: NAD; Patient resting comfortably  HEENT:  Normocephalic/ atraumatic  CV: IIR, normal S1 + S2, no m/r/g  Lungs:  Normal respiratory effort; decreased air entry to auscultation bilaterally  Abd: soft, non-tender; bowel sounds present  Ext:   warm and well perfused    Telemetry: a-fib 80s    Laboratory Data:                        10.9   16.35 )-----------( 236      ( 2024 03:49 )             34.7         146<H>  |  99  |  106<H>  ----------------------------<  129<H>  4.1   |  32<H>  |  2.60<H>    Ca    9.0      2024 03:49  Phos  5.5       Mg     3.10           PTT - ( 2024 03:49 )  PTT:66.7 sec          Inpatient Medications:  MEDICATIONS  (STANDING):  acetaminophen   IVPB .. 1000 milliGRAM(s) IV Intermittent once  albuterol    90 MICROgram(s) HFA Inhaler 2 Puff(s) Inhalation every 6 hours  allopurinol 100 milliGRAM(s) Oral daily  aspirin enteric coated 81 milliGRAM(s) Oral daily  atorvastatin 40 milliGRAM(s) Oral at bedtime  budesonide 160 MICROgram(s)/formoterol 4.5 MICROgram(s) Inhaler 2 Puff(s) Inhalation two times a day  dexAMETHasone  Injectable 6 milliGRAM(s) IV Push daily  guaiFENesin Oral Liquid (Sugar-Free) 100 milliGRAM(s) Oral every 6 hours  heparin  Infusion. 800 Unit(s)/Hr (8 mL/Hr) IV Continuous <Continuous>  ipratropium 17 MICROgram(s) HFA Inhaler 1 Puff(s) Inhalation every 6 hours  lactated ringers. 500 milliLiter(s) (100 mL/Hr) IV Continuous <Continuous>  metoprolol succinate ER 50 milliGRAM(s) Oral daily  pantoprazole    Tablet 40 milliGRAM(s) Oral before breakfast  piperacillin/tazobactam IVPB.. 3.375 Gram(s) IV Intermittent every 12 hours  polyethylene glycol 3350 17 Gram(s) Oral two times a day  senna 2 Tablet(s) Oral at bedtime  tiotropium 2.5 MICROgram(s) Inhaler 2 Puff(s) Inhalation daily      Assessment: 89 year old man with a-fib, HTN, prior CVA, and mitral stenosis presents with acute diastolic CHF and pleural effusion complicated by COVID-19.     Plan of Care:    #Acute diastolic CHF-  Associated with progressive hypoxic respiratory failure now on HFNC  Patient also with New R sided Pna and mucus plugging, Pulm input reviewed.   ABx started along with gentle hydration for IRINEO  Diuretics held for acute on chronic renal disease.   Echo reviewed.  Poor overall prognosis.       #A-fib-  Associated with moderate mitral stenosis.  Rates are controlled on Lopressor.  AC on hold given recent thoracic intervention.     #COVID-19-  Management as per the pulmonary team.     #ACP (advance care planning)-  Advanced care planning was addressed.  Poor overall prognosis.       Over 55 minutes spent on total encounter; more than 50% of the visit was spent counseling and/or coordinating care by the attending physician.      Rey Crowley MD [facility]  Cardiovascular Disease  [phone]

## 2024-02-26 NOTE — CONSULT NOTE ADULT - ATTENDING COMMENTS
Patient seen and examined agree with above note as modified, where appropriate, by me. Pt with recurrent effusion. Will plan for VATS pleural biopsy and possible pleurX when medically stable.
89M PMHx A. fib previously on Eliquis, CHF (EF 45-50%, combined systolic and diastolic), CVA, HTN, COPD, CKD3, recent admissions for CHF. Patient and recurrent pleural effusions s/p multiple thoracentesis, recurrent cyto negative.     Was transferred to [facility] for thoracic eval for pleurx, VATS and possible EBUS. Complicated by COVID.   Is s/p chest tube placement for pleural effusions.   Patient with acute worsening of his hypoxemic respiratory failure. Is s/p chest tube with likely a trapped lung.   However with new opacity on the right concerning for mucus plugging vs pna. Was started on HFNC and BIPAP.     # acute on chronic hypoxemic and hypercapnic RF  # Pneumonia vs mucus plugging  # pleural effusion  - acute worsening 2/2 to mucus plugging vs pneumonia, c/w abx, check sputum cx  - Would c/w HFNC, now weaned down to 60/60 from 100%. Would avoid bipap unless patient had narocosis as he required airway clearance.   - Wean off HFNC as tolerated.   - trend blood gas  - Will need on going aggressive chest PT.   - Agree with treatment for COVID  - Likely with trapped lung, F/U thoracic, may benefit for pleural biopsy/EBUS once acute issues resolve  - OOB, to chair, incentive cheryl.
Pt. with IRINEO on CKD. Scr increased to 2.6 today. Assessment and plan for IRINEO on CKD as outlined above. Monitor labs and urine output. Avoid any potential nephrotoxins. Dose medications as per eGFR. Overall prognosis guarded.
Patient with worsening acute hypoxemic respiratory failure on HFNC, s/p L sided chest tube, with persistent serosanguinous fluid drainage. Overall prognosis poor, see GOC for discussion with daughter. Family meeting on Wednesday at 2 PM.

## 2024-02-26 NOTE — PROGRESS NOTE ADULT - PROBLEM SELECTOR PLAN 1
- Patient admitted for acute hypoxic respiratory failure 2/2 recurrence of large left pleural effusion   - CT chest 2/8 with lingular and left lower lobe groundglass infiltrates likely pneumonia. Small bilateral pleural effusions. Cardiomegaly. Stable prominent mediastinal lymph nodes  - CXR 2/16 demonstrating increase in the left pleural effusion now moderate to large with associated consolidation  - Thoracic surgery consulted - s/p bedside PTC placement 2/24   -was on CTX since 2/20- new RML/RLL opacity on x-ray 2/23- RRT 2/24 with hypoxemia- escalated to IV Zosyn  -s/p 5days of Remdesevir for covid+  -started dexamethasone for covid 2/24 -- plan for 10d course  -chest PT, hypersal nebs, duonebs  -c/w HFNC 60L, 60FiO2, wean as tolerated  -Pulmonary consulted, appreciate recs  -GOC as below

## 2024-02-26 NOTE — PROGRESS NOTE ADULT - PROBLEM SELECTOR PLAN 4
- s/p multiple thoracenteses with most recent thoracentesis resulting in removal of 1L of serosanguinous fluid   - Cytopathology negative for malignant cells; pleural fluid from 2/8 exudative by Light's criteria; pleural fluid Cx unremarkable   - Thoracic Surgery consulted, f/u recs  -s/p bedside PTC placement 2/24  -c/b continued bleeding/dressing saturation and PTC site --> discussed with CT Sx; PTT and CBC remain stable on repeat this afternoon, will start pt specific PTT goal on heparin (for a-fib) given risk of bleeding outweighing benefit, goal PTT 40-60

## 2024-02-26 NOTE — CONSULT NOTE ADULT - PROBLEM SELECTOR RECOMMENDATION 4
- GOC discussion with primary team and with daughter Frida  - Plan for family meeting Wednesday 2 pm - GOC discussion with primary team and with daughter Frida  - Plan for family meeting Wednesday 2 pm with patient's two daughters

## 2024-02-26 NOTE — CONSULT NOTE ADULT - SUBJECTIVE AND OBJECTIVE BOX
[facility] DIVISION OF KIDNEY DISEASES AND HYPERTENSION -- [phone]  -- INITIAL CONSULT NOTE  --------------------------------------------------------------------------------  HPI:    89M hx of HFpEF EF 55-60%, afib on Eliquis, CVA, HTN, COPD, CKD3, and multiple admissions for CHF exacerbation in the past several months here for AHRF 2/2 PNA and L sided pleural effusion (exudative) requiring antibiotics and L sided chest tube with thoracic surgery. Course complicated by COVID, completed remdesivir course, but still on dexamethasone. CT Chest on 2/8 showed groundglass infiltrates c/f PNA. L sided pleural effusion developed and thoracic surgery was consulted to place pigtail catheter on 2/24. Serosanguinous fluid drained, but patient is still SOB. Transitioned from CTX to zosyn on 2/24.    Patient states that he has been SOB throughout his admission, and is currently on 50L/50% HFNC. Most recent chest imaging showing L sided chest tube in place with unchanged L PTX. Patient denies pain anywhere, including chest pain or pain from his chest tube site. He denies swelling as well. He has had poor appetite and has been unable to eat/drink consistently since being in the hospital. This has likely worsened since being admitted due to his moderately thickened liquid diet per nursing at bedside. He has been urinating regularly, and he denies dysuria/hematuria. No recent bladder scans or imaging results of the kidneys.    Creatinine has been worsening from baseline of 1.5-1.7 to 2.6 on day of admission. At the same time, patient has begun to develop hypernatremia.    PAST HISTORY  --------------------------------------------------------------------------------  PAST MEDICAL & SURGICAL HISTORY:  Afib      Congestive heart failure (CHF)      CVA (cerebral vascular accident)      Hypertension, unspecified type      Chronic obstructive pulmonary disease (COPD)      No significant past surgical history        FAMILY HISTORY:    PAST SOCIAL HISTORY:    ALLERGIES & MEDICATIONS  --------------------------------------------------------------------------------  Allergies    No Known Allergies    Intolerances      Standing Inpatient Medications  albuterol    90 MICROgram(s) HFA Inhaler 2 Puff(s) Inhalation every 6 hours  allopurinol 100 milliGRAM(s) Oral daily  aspirin enteric coated 81 milliGRAM(s) Oral daily  atorvastatin 40 milliGRAM(s) Oral at bedtime  budesonide 160 MICROgram(s)/formoterol 4.5 MICROgram(s) Inhaler 2 Puff(s) Inhalation two times a day  dexAMETHasone  Injectable 6 milliGRAM(s) IV Push daily  heparin  Infusion. 800 Unit(s)/Hr IV Continuous <Continuous>  hydrocodone/homatropine Syrup 5 milliLiter(s) Oral every 6 hours  ipratropium 17 MICROgram(s) HFA Inhaler 1 Puff(s) Inhalation every 6 hours  metoprolol succinate ER 50 milliGRAM(s) Oral daily  pantoprazole    Tablet 40 milliGRAM(s) Oral before breakfast  piperacillin/tazobactam IVPB.. 3.375 Gram(s) IV Intermittent every 12 hours  polyethylene glycol 3350 17 Gram(s) Oral two times a day  senna 2 Tablet(s) Oral at bedtime  sodium chloride 0.65% Nasal 1 Spray(s) Both Nostrils two times a day  sodium chloride 0.9% for Nebulization 3 milliLiter(s) Nebulizer every 6 hours  tiotropium 2.5 MICROgram(s) Inhaler 2 Puff(s) Inhalation daily    PRN Inpatient Medications  acetaminophen     Tablet .. 650 milliGRAM(s) Oral every 6 hours PRN      REVIEW OF SYSTEMS  --------------------------------------------------------------------------------  Gen: No fevers/chills  Skin: No rashes  Head/Eyes/Ears: No HA  Respiratory: No SOB, cough  CV: No CP  GI: No abdominal pain, diarrhea, N/V  : No dysuria, hematuria  MSK: No edema  Heme: No easy bruising or bleeding  Psych: No significant depression    All other systems were reviewed and are negative, except as noted.    VITALS/PHYSICAL EXAM  --------------------------------------------------------------------------------  T(C): 36.4 (02-26-24 @ 09:30), Max: 36.6 (02-26-24 @ 05:30)  HR: 87 (02-26-24 @ 13:49) (76 - 95)  BP: 142/73 (02-26-24 @ 09:30) (107/64 - 142/73)  RR: 18 (02-26-24 @ 13:49) (18 - 20)  SpO2: 100% (02-26-24 @ 13:49) (96% - 100%)  Wt(kg): --        02-25-24 @ 07:01  -  02-26-24 @ 07:00  --------------------------------------------------------  IN: 0 mL / OUT: 50 mL / NET: -50 mL      Physical Exam:  	Gen: laying back in bed uncomfortable and drowsy, with HFNC in place  	HEENT: Dry mucus membranes  	Pulm: Decreased breath sounds heard throughout, no obvious crackles, but unable to position patient to listen posteriorly due to lethargy  	CV: S1S2, irregular, normal rate, JVP 5cm H2O  	Abd: Soft, no suprapubic TTP, no TTP  	Ext: No LE edema B/L  	Neuro: Awake but drowsy  	Skin: Warm and dry  	Vascular access:    LABS/STUDIES  --------------------------------------------------------------------------------              10.9   16.35 >-----------<  236      [02-26-24 @ 03:49]              34.7     146  |  99  |  106  ----------------------------<  129      [02-26-24 @ 03:49]  4.1   |  32  |  2.60        Ca     9.0     [02-26-24 @ 03:49]      Mg     3.10     [02-26-24 @ 03:49]      Phos  5.5     [02-26-24 @ 03:49]        PTT: 66.7       [02-26-24 @ 03:49]      Creatinine Trend:  SCr 2.60 [02-26 @ 03:49]  SCr 2.45 [02-25 @ 05:45]  SCr 2.30 [02-24 @ 06:57]  SCr 2.17 [02-23 @ 04:00]  SCr 1.89 [02-22 @ 06:46]    Urinalysis - [02-26-24 @ 03:49]      Color  / Appearance  / SG  / pH       Gluc 129 / Ketone   / Bili  / Urobili        Blood  / Protein  / Leuk Est  / Nitrite       RBC  / WBC  / Hyaline  / Gran  / Sq Epi  / Non Sq Epi  / Bacteria       Iron 58, TIBC 275, %sat 21      [01-02-24 @ 07:15]  Ferritin 298      [01-02-24 @ 07:15]  HbA1c 5.3      [06-01-19 @ 05:00]  TSH 1.193      [11-24-23 @ 06:55]  Lipid: chol 103, TG 61, HDL 48, LDL --      [02-07-24 @ 06:49]       [facility] DIVISION OF KIDNEY DISEASES AND HYPERTENSION -- [phone]  -- INITIAL CONSULT NOTE  --------------------------------------------------------------------------------  HPI:89-year-old Male with PMH of CKD, HTN, HFpEF, Afib, CVA, COPD and multiple admissions for CHF exacerbation in the past several months admitted for AHRF 2/2 PNA and L sided pleural effusion (exudative) requiring antibiotics and L sided chest tube with thoracic surgery. Course complicated by COVID infection (completed remdesivir course). Nephrology consulted for IRINEO on CKD.     Pt. seen and examined this afternoon. Pt. states that he has been experiencing SOB throughout his admission, and is currently on HFNC. Also endorses poor appetite. Pt denies pain, HA, N/V, diarrhea, and LE edema.        Creatinine has been worsening from baseline of 1.5-1.7 to 2.6 on day of admission. At the same time, patient has begun to develop hypernatremia.    PAST HISTORY  --------------------------------------------------------------------------------  PAST MEDICAL & SURGICAL HISTORY:  Afib  Congestive heart failure (CHF)  CVA (cerebral vascular accident)  Hypertension, unspecified type  Chronic obstructive pulmonary disease (COPD)  No significant past surgical history    FAMILY HISTORY: n/a    PAST SOCIAL HISTORY: n/a    ALLERGIES & MEDICATIONS  --------------------------------------------------------------------------------  Allergies    No Known Allergies    Intolerances    Standing Inpatient Medications  albuterol    90 MICROgram(s) HFA Inhaler 2 Puff(s) Inhalation every 6 hours  allopurinol 100 milliGRAM(s) Oral daily  aspirin enteric coated 81 milliGRAM(s) Oral daily  atorvastatin 40 milliGRAM(s) Oral at bedtime  budesonide 160 MICROgram(s)/formoterol 4.5 MICROgram(s) Inhaler 2 Puff(s) Inhalation two times a day  dexAMETHasone  Injectable 6 milliGRAM(s) IV Push daily  heparin  Infusion. 800 Unit(s)/Hr IV Continuous <Continuous>  hydrocodone/homatropine Syrup 5 milliLiter(s) Oral every 6 hours  ipratropium 17 MICROgram(s) HFA Inhaler 1 Puff(s) Inhalation every 6 hours  metoprolol succinate ER 50 milliGRAM(s) Oral daily  pantoprazole    Tablet 40 milliGRAM(s) Oral before breakfast  piperacillin/tazobactam IVPB.. 3.375 Gram(s) IV Intermittent every 12 hours  polyethylene glycol 3350 17 Gram(s) Oral two times a day  senna 2 Tablet(s) Oral at bedtime  sodium chloride 0.65% Nasal 1 Spray(s) Both Nostrils two times a day  sodium chloride 0.9% for Nebulization 3 milliLiter(s) Nebulizer every 6 hours  tiotropium 2.5 MICROgram(s) Inhaler 2 Puff(s) Inhalation daily    PRN Inpatient Medications  acetaminophen     Tablet .. 650 milliGRAM(s) Oral every 6 hours PRN    REVIEW OF SYSTEMS  --------------------------------------------------------------------------------  Gen: +poor appetite, +fatigue, No fevers/chills  Skin: No rashes  Head/Eyes/Ears: No HA  Respiratory: +SOB, No cough  CV: No CP  GI: No abdominal pain, diarrhea, N/V  : No dysuria, hematuria  MSK: No edema  Heme: No easy bruising or bleeding  Psych: No significant depression    All other systems were reviewed and are negative, except as noted.    VITALS/PHYSICAL EXAM  --------------------------------------------------------------------------------  T(C): 36.4 (02-26-24 @ 09:30), Max: 36.6 (02-26-24 @ 05:30)  HR: 87 (02-26-24 @ 13:49) (76 - 95)  BP: 142/73 (02-26-24 @ 09:30) (107/64 - 142/73)  RR: 18 (02-26-24 @ 13:49) (18 - 20)  SpO2: 100% (02-26-24 @ 13:49) (96% - 100%)  Wt(kg): --    02-25-24 @ 07:01  -  02-26-24 @ 07:00  --------------------------------------------------------  IN: 0 mL / OUT: 50 mL / NET: -50 mL    Physical Exam:  	Gen: laying back in bed uncomfortable and drowsy  	HEENT: Dry MMM, +HFNC   	Pulm: Decreased breath sounds B/L, no obvious crackles on anterior chest wall exam   	CV: S1S2, no JVD   	Abd: Soft, NTND,               : no suprapubic TTP, no lewis  	Ext: No LE edema B/L  	Neuro: Awake but drowsy  	Skin: Warm and dry    LABS/STUDIES  --------------------------------------------------------------------------------              10.9   16.35 >-----------<  236      [02-26-24 @ 03:49]              34.7     146  |  99  |  106  ----------------------------<  129      [02-26-24 @ 03:49]  4.1   |  32  |  2.60        Ca     9.0     [02-26-24 @ 03:49]      Mg     3.10     [02-26-24 @ 03:49]      Phos  5.5     [02-26-24 @ 03:49]    PTT: 66.7       [02-26-24 @ 03:49]    Creatinine Trend:  SCr 2.60 [02-26 @ 03:49]  SCr 2.45 [02-25 @ 05:45]  SCr 2.30 [02-24 @ 06:57]  SCr 2.17 [02-23 @ 04:00]  SCr 1.89 [02-22 @ 06:46]    Iron 58, TIBC 275, %sat 21      [01-02-24 @ 07:15]  Ferritin 298      [01-02-24 @ 07:15]  HbA1c 5.3      [06-01-19 @ 05:00]  TSH 1.193      [11-24-23 @ 06:55]  Lipid: chol 103, TG 61, HDL 48, LDL --      [02-07-24 @ 06:49] [facility] DIVISION OF KIDNEY DISEASES AND HYPERTENSION -- [phone]  -- INITIAL CONSULT NOTE  --------------------------------------------------------------------------------  HPI:89-year-old Male with PMH of CKD, HTN, HFpEF, Afib, CVA, COPD and multiple admissions for CHF exacerbation in the past several months admitted for AHRF 2/2 PNA and L sided pleural effusion (exudative) requiring antibiotics and L sided chest tube with thoracic surgery. Course complicated by COVID infection (completed remdesivir course). Nephrology consulted for IRINEO on CKD.     On review of [place]/HIE Scr was elevated at 1.61 on 1/5/24. Pt. was hospitalized at [facility] (2/6/24-2/15/24) and Scr remained elevated at 1.52-1.87. On this admission Scr elevated at 1.41 on 2/16. Scr progressively increased to 2.68 today.     Pt. seen and examined this afternoon with pts sister at bedside. Pt. states that he has been experiencing SOB throughout his admission, and is currently on HFNC. Also endorses poor appetite. Pt denies pain, HA, N/V, diarrhea, and LE edema.     PAST HISTORY  --------------------------------------------------------------------------------  PAST MEDICAL & SURGICAL HISTORY:  Afib  Congestive heart failure (CHF)  CVA (cerebral vascular accident)  Hypertension, unspecified type  Chronic obstructive pulmonary disease (COPD)  No significant past surgical history    FAMILY HISTORY: n/a    PAST SOCIAL HISTORY: n/a    ALLERGIES & MEDICATIONS  --------------------------------------------------------------------------------  Allergies    No Known Allergies    Intolerances    Standing Inpatient Medications  albuterol    90 MICROgram(s) HFA Inhaler 2 Puff(s) Inhalation every 6 hours  allopurinol 100 milliGRAM(s) Oral daily  aspirin enteric coated 81 milliGRAM(s) Oral daily  atorvastatin 40 milliGRAM(s) Oral at bedtime  budesonide 160 MICROgram(s)/formoterol 4.5 MICROgram(s) Inhaler 2 Puff(s) Inhalation two times a day  dexAMETHasone  Injectable 6 milliGRAM(s) IV Push daily  heparin  Infusion. 800 Unit(s)/Hr IV Continuous <Continuous>  hydrocodone/homatropine Syrup 5 milliLiter(s) Oral every 6 hours  ipratropium 17 MICROgram(s) HFA Inhaler 1 Puff(s) Inhalation every 6 hours  metoprolol succinate ER 50 milliGRAM(s) Oral daily  pantoprazole    Tablet 40 milliGRAM(s) Oral before breakfast  piperacillin/tazobactam IVPB.. 3.375 Gram(s) IV Intermittent every 12 hours  polyethylene glycol 3350 17 Gram(s) Oral two times a day  senna 2 Tablet(s) Oral at bedtime  sodium chloride 0.65% Nasal 1 Spray(s) Both Nostrils two times a day  sodium chloride 0.9% for Nebulization 3 milliLiter(s) Nebulizer every 6 hours  tiotropium 2.5 MICROgram(s) Inhaler 2 Puff(s) Inhalation daily    PRN Inpatient Medications  acetaminophen     Tablet .. 650 milliGRAM(s) Oral every 6 hours PRN    REVIEW OF SYSTEMS  --------------------------------------------------------------------------------  Gen: +poor appetite, +fatigue, No fevers/chills  Skin: No rashes  Head/Eyes/Ears: No HA  Respiratory: +SOB, No cough  CV: No CP  GI: No abdominal pain, diarrhea, N/V  : No dysuria, hematuria  MSK: No edema  Heme: No easy bruising or bleeding  Psych: No significant depression    All other systems were reviewed and are negative, except as noted.    VITALS/PHYSICAL EXAM  --------------------------------------------------------------------------------  T(C): 36.4 (02-26-24 @ 09:30), Max: 36.6 (02-26-24 @ 05:30)  HR: 87 (02-26-24 @ 13:49) (76 - 95)  BP: 142/73 (02-26-24 @ 09:30) (107/64 - 142/73)  RR: 18 (02-26-24 @ 13:49) (18 - 20)  SpO2: 100% (02-26-24 @ 13:49) (96% - 100%)  Wt(kg): --    02-25-24 @ 07:01  -  02-26-24 @ 07:00  --------------------------------------------------------  IN: 0 mL / OUT: 50 mL / NET: -50 mL    Physical Exam:   	Gen: ill appearing, cachectic  	HEENT: Dry MMM, +HFNC   	Pulm: Decreased breath sounds B/L, no obvious crackles on anterior chest wall exam, +left sided chest tube   	CV: S1S2, no JVD   	Abd: Soft, NTND,               : no suprapubic TTP, no lewis  	Ext: No LE edema B/L  	Neuro: Awake but drowsy  	Skin: Warm and dry    LABS/STUDIES  --------------------------------------------------------------------------------              10.9   16.35 >-----------<  236      [02-26-24 @ 03:49]              34.7     146  |  99  |  106  ----------------------------<  129      [02-26-24 @ 03:49]  4.1   |  32  |  2.60        Ca     9.0     [02-26-24 @ 03:49]      Mg     3.10     [02-26-24 @ 03:49]      Phos  5.5     [02-26-24 @ 03:49]    PTT: 66.7       [02-26-24 @ 03:49]    Creatinine Trend:  SCr 2.60 [02-26 @ 03:49]  SCr 2.45 [02-25 @ 05:45]  SCr 2.30 [02-24 @ 06:57]  SCr 2.17 [02-23 @ 04:00]  SCr 1.89 [02-22 @ 06:46]    Iron 58, TIBC 275, %sat 21      [01-02-24 @ 07:15]  Ferritin 298      [01-02-24 @ 07:15]  HbA1c 5.3      [06-01-19 @ 05:00]  TSH 1.193      [11-24-23 @ 06:55]  Lipid: chol 103, TG 61, HDL 48, LDL --      [02-07-24 @ 06:49] [facility] DIVISION OF KIDNEY DISEASES AND HYPERTENSION -- [phone]  -- INITIAL CONSULT NOTE  --------------------------------------------------------------------------------  HPI:89-year-old Male with PMH of CKD, HTN, HFpEF, Afib, CVA, COPD and multiple admissions for CHF exacerbation in the past several months admitted for AHRF 2/2 PNA and L sided pleural effusion (exudative) requiring antibiotics and L sided chest tube with thoracic surgery. Course complicated by COVID infection (completed remdesivir course). Nephrology consulted for IRINEO on CKD.     On review of sunrise/HIE Scr was elevated at 1.61 on 1/5/24. Pt. was hospitalized at [facility] (2/6/24-2/15/24) and Scr remained elevated at 1.52-1.87. On this admission Scr elevated at 1.41 on 2/16. Scr progressively increased to 2.68 today.     Pt. seen and examined this afternoon with pts sister at bedside. Pt. endorses SOB and poor appetite. Pt denies pain, HA, N/V, diarrhea, and LE edema. Pt. does not provide much history due to difficulty speaking and drowsiness.     PAST HISTORY  --------------------------------------------------------------------------------  PAST MEDICAL & SURGICAL HISTORY:  Afib  Congestive heart failure (CHF)  CVA (cerebral vascular accident)  Hypertension, unspecified type  Chronic obstructive pulmonary disease (COPD)  No significant past surgical history    FAMILY HISTORY: n/a    PAST SOCIAL HISTORY: n/a    ALLERGIES & MEDICATIONS  --------------------------------------------------------------------------------  Allergies    No Known Allergies    Intolerances    Standing Inpatient Medications  albuterol    90 MICROgram(s) HFA Inhaler 2 Puff(s) Inhalation every 6 hours  allopurinol 100 milliGRAM(s) Oral daily  aspirin enteric coated 81 milliGRAM(s) Oral daily  atorvastatin 40 milliGRAM(s) Oral at bedtime  budesonide 160 MICROgram(s)/formoterol 4.5 MICROgram(s) Inhaler 2 Puff(s) Inhalation two times a day  dexAMETHasone  Injectable 6 milliGRAM(s) IV Push daily  heparin  Infusion. 800 Unit(s)/Hr IV Continuous <Continuous>  hydrocodone/homatropine Syrup 5 milliLiter(s) Oral every 6 hours  ipratropium 17 MICROgram(s) HFA Inhaler 1 Puff(s) Inhalation every 6 hours  metoprolol succinate ER 50 milliGRAM(s) Oral daily  pantoprazole    Tablet 40 milliGRAM(s) Oral before breakfast  piperacillin/tazobactam IVPB.. 3.375 Gram(s) IV Intermittent every 12 hours  polyethylene glycol 3350 17 Gram(s) Oral two times a day  senna 2 Tablet(s) Oral at bedtime  sodium chloride 0.65% Nasal 1 Spray(s) Both Nostrils two times a day  sodium chloride 0.9% for Nebulization 3 milliLiter(s) Nebulizer every 6 hours  tiotropium 2.5 MICROgram(s) Inhaler 2 Puff(s) Inhalation daily    PRN Inpatient Medications  acetaminophen     Tablet .. 650 milliGRAM(s) Oral every 6 hours PRN    REVIEW OF SYSTEMS  --------------------------------------------------------------------------------  Gen: +poor appetite, +fatigue, No fevers/chills  Skin: No rashes  Head/Eyes/Ears: No HA  Respiratory: +SOB, No cough  CV: No CP  GI: No abdominal pain, diarrhea, N/V  : No dysuria, hematuria  MSK: No edema  Heme: No easy bruising or bleeding  Psych: No significant depression    All other systems were reviewed and are negative, except as noted.    VITALS/PHYSICAL EXAM  --------------------------------------------------------------------------------  T(C): 36.4 (02-26-24 @ 09:30), Max: 36.6 (02-26-24 @ 05:30)  HR: 87 (02-26-24 @ 13:49) (76 - 95)  BP: 142/73 (02-26-24 @ 09:30) (107/64 - 142/73)  RR: 18 (02-26-24 @ 13:49) (18 - 20)  SpO2: 100% (02-26-24 @ 13:49) (96% - 100%)  Wt(kg): --    02-25-24 @ 07:01  -  02-26-24 @ 07:00  --------------------------------------------------------  IN: 0 mL / OUT: 50 mL / NET: -50 mL    Physical Exam:   	Gen: ill appearing, cachectic  	HEENT: Dry MMM, +HFNC   	Pulm: Decreased breath sounds B/L, no obvious crackles on anterior chest wall exam, +left sided chest tube   	CV: S1S2, no JVD   	Abd: Soft, NTND,               : no suprapubic TTP, no lewis  	Ext: No LE edema B/L  	Neuro: Awake but drowsy  	Skin: Warm and dry    LABS/STUDIES  --------------------------------------------------------------------------------              10.9   16.35 >-----------<  236      [02-26-24 @ 03:49]              34.7     146  |  99  |  106  ----------------------------<  129      [02-26-24 @ 03:49]  4.1   |  32  |  2.60        Ca     9.0     [02-26-24 @ 03:49]      Mg     3.10     [02-26-24 @ 03:49]      Phos  5.5     [02-26-24 @ 03:49]    PTT: 66.7       [02-26-24 @ 03:49]    Creatinine Trend:  SCr 2.60 [02-26 @ 03:49]  SCr 2.45 [02-25 @ 05:45]  SCr 2.30 [02-24 @ 06:57]  SCr 2.17 [02-23 @ 04:00]  SCr 1.89 [02-22 @ 06:46]    Iron 58, TIBC 275, %sat 21      [01-02-24 @ 07:15]  Ferritin 298      [01-02-24 @ 07:15]  HbA1c 5.3      [06-01-19 @ 05:00]  TSH 1.193      [11-24-23 @ 06:55]  Lipid: chol 103, TG 61, HDL 48, LDL --      [02-07-24 @ 06:49] [facility] DIVISION OF KIDNEY DISEASES AND HYPERTENSION -- [phone]  -- INITIAL CONSULT NOTE  --------------------------------------------------------------------------------  HPI: 89-year-old male with PMH of CKD, HTN, HFpEF, AFib, CVA, COPD and multiple admissions for CHF exacerbation in the past several months admitted for AHRF 2/2 PNA and L sided pleural effusion (exudative) requiring antibiotics and L sided chest tube with thoracic surgery. Course complicated by COVID-19 (completed Remdesivir course). Nephrology consulted for IRINEO on CKD.     On review of [place]/HIE Scr was elevated at 1.61 on 1/5/24. Pt. was hospitalized at [facility] (2/6/24-2/15/24) and Scr remained elevated between 1.52-1.87. On this admission, Scr elevated at 1.41 on 2/16. Scr progressively increased to 2.6 today.     Pt. seen and examined this afternoon with sister at bedside. Pt. does not provide much history/ROS due to difficulty speaking and drowsiness. Limited ROS obtained from pt. Pt. endorses SOB and poor appetite.     PAST HISTORY  --------------------------------------------------------------------------------  PAST MEDICAL & SURGICAL HISTORY:  Afib  Congestive heart failure (CHF)  CVA (cerebral vascular accident)  Hypertension, unspecified type  Chronic obstructive pulmonary disease (COPD)  No significant past surgical history    FAMILY HISTORY: n/a    PAST SOCIAL HISTORY: n/a    ALLERGIES & MEDICATIONS  --------------------------------------------------------------------------------  Allergies    No Known Allergies    Intolerances    Standing Inpatient Medications  albuterol    90 MICROgram(s) HFA Inhaler 2 Puff(s) Inhalation every 6 hours  allopurinol 100 milliGRAM(s) Oral daily  aspirin enteric coated 81 milliGRAM(s) Oral daily  atorvastatin 40 milliGRAM(s) Oral at bedtime  budesonide 160 MICROgram(s)/formoterol 4.5 MICROgram(s) Inhaler 2 Puff(s) Inhalation two times a day  dexAMETHasone  Injectable 6 milliGRAM(s) IV Push daily  heparin  Infusion. 800 Unit(s)/Hr IV Continuous <Continuous>  hydrocodone/homatropine Syrup 5 milliLiter(s) Oral every 6 hours  ipratropium 17 MICROgram(s) HFA Inhaler 1 Puff(s) Inhalation every 6 hours  metoprolol succinate ER 50 milliGRAM(s) Oral daily  pantoprazole    Tablet 40 milliGRAM(s) Oral before breakfast  piperacillin/tazobactam IVPB.. 3.375 Gram(s) IV Intermittent every 12 hours  polyethylene glycol 3350 17 Gram(s) Oral two times a day  senna 2 Tablet(s) Oral at bedtime  sodium chloride 0.65% Nasal 1 Spray(s) Both Nostrils two times a day  sodium chloride 0.9% for Nebulization 3 milliLiter(s) Nebulizer every 6 hours  tiotropium 2.5 MICROgram(s) Inhaler 2 Puff(s) Inhalation daily    PRN Inpatient Medications  acetaminophen     Tablet .. 650 milliGRAM(s) Oral every 6 hours PRN    REVIEW OF SYSTEMS  --------------------------------------------------------------------------------  Limited ROS obtained from pt.    Gen: +poor appetite, +fatigue  Respiratory: +SOB  CV: No CP  GI: No abdominal pain  MSK: No LE edema      VITALS/PHYSICAL EXAM  --------------------------------------------------------------------------------  T(C): 36.4 (02-26-24 @ 09:30), Max: 36.6 (02-26-24 @ 05:30)  HR: 87 (02-26-24 @ 13:49) (76 - 95)  BP: 142/73 (02-26-24 @ 09:30) (107/64 - 142/73)  RR: 18 (02-26-24 @ 13:49) (18 - 20)  SpO2: 100% (02-26-24 @ 13:49) (96% - 100%)  Wt(kg): --    02-25-24 @ 07:01  -  02-26-24 @ 07:00  --------------------------------------------------------  IN: 0 mL / OUT: 50 mL / NET: -50 mL    Physical Exam:   	Gen: ill appearing, cachectic  	HEENT: Dry MMM, +HFNC   	Pulm: Decreased breath sounds B/L, no obvious crackles on anterior chest wall exam, +left sided chest tube   	CV: S1S2, no JVD   	Abd: Soft, NT, ND               : no suprapubic TTP  	Ext: No LE edema B/L  	Neuro: Drowsy, provides limited ROS  	Skin: Dry    LABS/STUDIES  --------------------------------------------------------------------------------              10.9   16.35 >-----------<  236      [02-26-24 @ 03:49]              34.7     146  |  99  |  106  ----------------------------<  129      [02-26-24 @ 03:49]  4.1   |  32  |  2.60        Ca     9.0     [02-26-24 @ 03:49]      Mg     3.10     [02-26-24 @ 03:49]      Phos  5.5     [02-26-24 @ 03:49]    PTT: 66.7       [02-26-24 @ 03:49]    Creatinine Trend:  SCr 2.60 [02-26 @ 03:49]  SCr 2.45 [02-25 @ 05:45]  SCr 2.30 [02-24 @ 06:57]  SCr 2.17 [02-23 @ 04:00]  SCr 1.89 [02-22 @ 06:46]    Iron 58, TIBC 275, %sat 21      [01-02-24 @ 07:15]  Ferritin 298      [01-02-24 @ 07:15]  HbA1c 5.3      [06-01-19 @ 05:00]  TSH 1.193      [11-24-23 @ 06:55]  Lipid: chol 103, TG 61, HDL 48, LDL --      [02-07-24 @ 06:49]

## 2024-02-26 NOTE — PROVIDER CONTACT NOTE (OTHER) - SITUATION
patient had 3 drain sponges & 3 4x4 gauze saturated with sanguineous drainage from chest tube drainage site patient had 2 drain sponges & 4 4x4 gauze saturated with sanguineous drainage from chest tube drainage site

## 2024-02-26 NOTE — PROVIDER CONTACT NOTE (OTHER) - SITUATION
Patients L chest tube dressing is saturated with blood. Total chest tube drainage for shift was 50 ml.

## 2024-02-26 NOTE — CONSULT NOTE ADULT - CONSULT REASON
CHF
Evaluation for Pleurx catheter insertion
hypoxemia
complex medical decision making in the setting of serious illness
IRINEO on CKD

## 2024-02-26 NOTE — CHART NOTE - NSCHARTNOTEFT_GEN_A_CORE
Notified by primary RN that patient's chest tube site dressing saturated with blood.     Patient is 89M PMHx A. fib on Eliquis, CHF (EF 45-50%, combined systolic and diastolic), CVA, HTN, COPD, CKD3, multiple hospitalizations for CHF exacerbation in the prior months, left sided pleural effusion that was drained last hospitalization, admitted to [facility] on 2/6/2024 for CHF exacerbation and acute on chronic hypoxic respiratory failure 2/2 large left pleural effusion. Patient transferred to [facility] for thoracic surgery eval. Now c/b covid+, on Remdesevir. s/p pigtail by CT surg 2/23.     Seen and assessed the patient at bedside with the primary RN. Patient is calm , at his baseline breathing with HiFLO, not in any acute distress . Patient's PIG tail catheter site dressing on the Lt lateral chest  is noted to be saturated with blood and clots. Suture at the PIG tail site is intact. No air leak found. No new hypoxia, and distress noted.  Saturated dressing removed. New dsg applied with surgicel and 4x4. VS stable at the time of assessment.   Endorsed to day team ACP  to call CT surgery  to evaluate if oozing persists.     T(C): 36.6 (02-26-24 @ 05:30), Max: 36.6 (02-26-24 @ 05:30)  HR: 95 (02-26-24 @ 05:30) (76 - 95)  BP: 140/62 (02-26-24 @ 05:30) (107/64 - 154/80)  RR: 18 (02-26-24 @ 05:30) (18 - 18)  SpO2: 96% (02-26-24 @ 05:30) (96% - 99%) Notified by primary RN that patient's chest tube site dressing saturated with blood. As per RN , total output through the chest tube for the shift is 50cc only.     Patient is 89M PMHx A. fib on Eliquis, CHF (EF 45-50%, combined systolic and diastolic), CVA, HTN, COPD, CKD3, multiple hospitalizations for CHF exacerbation in the prior months, left sided pleural effusion that was drained last hospitalization, admitted to [facility] on 2/6/2024 for CHF exacerbation and acute on chronic hypoxic respiratory failure 2/2 large left pleural effusion. Patient transferred to [facility] for thoracic surgery eval. Now c/b covid+, on Remdesevir. s/p pigtail by CT surg 2/23.     Seen and assessed the patient at bedside with the primary RN. Patient is calm , at his baseline breathing with HiFLO, not in any acute distress . Patient's PIG tail catheter site dressing on the Lt lateral chest  is noted to be saturated with blood and clots. Suture at the PIG tail site is intact. No air leak found. No new hypoxia, and distress noted.  Saturated dressing removed. New dsg applied with surgicel and 4x4. VS stable at the time of assessment.   Endorsed to day team ACP  to call CT surgery  to evaluate if oozing persists.     T(C): 36.6 (02-26-24 @ 05:30), Max: 36.6 (02-26-24 @ 05:30)  HR: 95 (02-26-24 @ 05:30) (76 - 95)  BP: 140/62 (02-26-24 @ 05:30) (107/64 - 154/80)  RR: 18 (02-26-24 @ 05:30) (18 - 18)  SpO2: 96% (02-26-24 @ 05:30) (96% - 99%) Notified by primary RN that patient's chest tube site dressing saturated with blood. As per RN , total output through the chest tube for the shift is 50cc only.     Patient is 89M PMHx A. fib on Eliquis, CHF (EF 45-50%, combined systolic and diastolic), CVA, HTN, COPD, CKD3, multiple hospitalizations for CHF exacerbation in the prior months, left sided pleural effusion that was drained last hospitalization, admitted to [facility] on 2/6/2024 for CHF exacerbation and acute on chronic hypoxic respiratory failure 2/2 large left pleural effusion. Patient transferred to [facility] for thoracic surgery eval. Now c/b covid+, on Remdesevir. s/p pigtail by CT surg 2/23.     Seen and assessed the patient at bedside with the primary RN. Patient is calm , at his baseline breathing with HiFLO, not in any acute distress . Patient's PIG tail catheter site dressing on the Lt lateral chest  is noted to be saturated with blood and clots. Suture at the PIG tail site is intact. No air leak found. No new hypoxia, and distress noted.  Saturated dressing removed. New dsg applied with surgicel and 4x4. VS stable at the time of assessment. Patient is on heparin gtt for afib, PTT at 0345 am is within the goal range -66  Endorsed to day team ACP  to call CT surgery  to evaluate if oozing persists.     T(C): 36.6 (02-26-24 @ 05:30), Max: 36.6 (02-26-24 @ 05:30)  HR: 95 (02-26-24 @ 05:30) (76 - 95)  BP: 140/62 (02-26-24 @ 05:30) (107/64 - 154/80)  RR: 18 (02-26-24 @ 05:30) (18 - 18)  SpO2: 96% (02-26-24 @ 05:30) (96% - 99%) Notified by primary RN that patient's chest tube site dressing saturated with blood. As per RN , total output through the chest tube for the shift is 50cc only.     Patient is 89M PMHx A. fib on Eliquis, CHF (EF 45-50%, combined systolic and diastolic), CVA, HTN, COPD, CKD3, multiple hospitalizations for CHF exacerbation in the prior months, left sided pleural effusion that was drained last hospitalization, admitted to [facility] on 2/6/2024 for CHF exacerbation and acute on chronic hypoxic respiratory failure 2/2 large left pleural effusion. Patient transferred to [facility] for thoracic surgery eval. Now c/b covid+, on Remdesevir. s/p pigtail by CT surg 2/23.     Seen and assessed the patient at bedside with the primary RN. Patient is calm , at his baseline breathing with HiFLO, not in any acute distress . Patient's PIG tail catheter site dressing on the Lt lateral chest  is noted to be saturated with blood and clots. Suture at the PIG tail site is intact. No air leak found. No new hypoxia, and distress noted.  Saturated dressing removed. New dsg applied with surgicel and 4x4. VS stable at the time of assessment. Patient is on heparin gtt for afib, PTT at 0345 am is within the goal range -66  Endorsed to day team ACP  to call CT surgery  to evaluate if oozing persists. CXR ordered for today morning already by CTS  PA    T(C): 36.6 (02-26-24 @ 05:30), Max: 36.6 (02-26-24 @ 05:30)  HR: 95 (02-26-24 @ 05:30) (76 - 95)  BP: 140/62 (02-26-24 @ 05:30) (107/64 - 154/80)  RR: 18 (02-26-24 @ 05:30) (18 - 18)  SpO2: 96% (02-26-24 @ 05:30) (96% - 99%)

## 2024-02-26 NOTE — PROVIDER CONTACT NOTE (OTHER) - ASSESSMENT
A&Ox4. hiflo 50/50 currently in place. Not in distress. Patient saturated 3 drain sponges & 3 4x4 gauze with sanguineous drainage from chest tube drainage site since 10am dressing change. scant drainage draining into chest tube. A&Ox4. hiflo 50/50 currently in place. Not in distress. Patient saturated 2 drain sponges & 4 4x4 gauze with sanguineous drainage from chest tube drainage site since 10am dressing change. scant drainage draining into chest tube.

## 2024-02-26 NOTE — CONSULT NOTE ADULT - ASSESSMENT
Patient is a 89M PMHx A. fib previously on Eliquis, CHF (EF 45-50%, combined systolic and diastolic), CVA, HTN, COPD, CKD3, multiple hospitalizations for CHF exacerbation in the prior months, left sided pleural effusion that was drained last hospitalization, admitted to [facility] on 2/6/2024 for CHF exacerbation and acute on chronic hypoxic respiratory failure 2/2 large left pleural effusion. TTE on 2/7 showed EF 55-60%, pulmonary hypertension, moderate mitral stenosis. He was evaluated by Pulmonary and had Thoracentesis on 2/8, 1 L bloody output drained.  Pulmonology recommended transfer to [facility] for Pleurex catheter and possible EUS for lymph node biopsy.  Patient placed on IV lasix for diuresis.  Pt was also found with 2.5 x 1.4 cm right retroareolar breast nodule larger than 9/19/2023.   Surgery was consulted and recommended outpatient follow-up. Patient also found with LLE blister, which is chronic.  Pt also with IRINEO 2/2 CKD3 on this admission, with Hypernatremia and Hypokalemia, K was repleted. Patient transitioned from Eliquis to heparin gtt during admission due to valvular heart disease. Patient was also evaluated by cardiology for moderate mitral stenosis, CHF, and a. fib, was diuresed with IV Lasix, treated with metoprolol, and recommended for structural heart evaluation knowing that patient is a poor candidate for procedural intervention due to comorbidities. Patient was then transferred to [facility].   Of note, patient was recently admitted in 1/2024 presenting with concern for LE blisters, found to have acute on chronic CHF, large exudative pleural effusion s/p 1L removal, transferred from  for thoracic surgery evaluation and was found to be a poor surgical candidate. IR was consulted and recommended that patient had no indication for a pigtail catheter placement due to resolution of dyspnea. Patient was then discharged.   On interview, patient denies chest pain, abdominal pain, reports difficulty urinating, and reports breathing is manageable. Patient endorses productive cough. Patient reports being willing to undergo thoracentesis or PleurX catheter.   Patient is a 89M PMHx A. fib previously on Eliquis, CHF (EF 45-50%, combined systolic and diastolic), CVA, HTN, COPD, CKD3, multiple hospitalizations for CHF exacerbation in the prior months, left sided pleural effusion that was drained last hospitalization, admitted to [facility] on 2/6/2024 for CHF exacerbation and acute on chronic hypoxic respiratory failure 2/2 large left pleural effusion. TTE on 2/7 showed EF 55-60%, pulmonary hypertension, moderate mitral stenosis. He was evaluated by Pulmonary and had Thoracentesis on 2/8, 1 L bloody output drained.  Pulmonology recommended transfer to [facility] for Pleurex catheter and possible EUS for lymph node biopsy.  Patient placed on IV lasix for diuresis.  Pt was also found with 2.5 x 1.4 cm right retroareolar breast nodule larger than 9/19/2023.   Surgery was consulted and recommended outpatient follow-up. Patient also found with LLE blister, which is chronic.  Pt also with IRINEO 2/2 CKD3 on this admission, with Hypernatremia and Hypokalemia, K was repleted. Patient transitioned from Eliquis to heparin gtt during admission due to valvular heart disease. Patient was also evaluated by cardiology for moderate mitral stenosis, CHF, and a. fib, was diuresed with IV Lasix, treated with metoprolol, and recommended for structural heart evaluation knowing that patient is a poor candidate for procedural intervention due to comorbidities. Patient was then transferred to [facility]. Of note, patient was recently admitted in 1/2024 presenting with concern for LE blisters, found to have acute on chronic CHF, large exudative pleural effusion s/p 1L removal, transferred from  for thoracic surgery evaluation and was found to be a poor surgical candidate. IR was consulted and recommended that patient had no indication for a pigtail catheter placement due to resolution of dyspnea. Patient was then discharged. On interview, patient denies chest pain, abdominal pain, reports difficulty urinating, and reports breathing is manageable. Patient endorses productive cough. Patient reports being willing to undergo thoracentesis or PleurX catheter.

## 2024-02-26 NOTE — SWALLOW BEDSIDE ASSESSMENT ADULT - COMMENTS
Consult received and chart reviewed. Per charting, patient with RRT this AM for hypoxia. SLP called out to discuss POC with ACP, who reported to hold off on swallow assessment at this time. Discussed with ACP to please reconsult this department when patient is medically cleared for swallow assessment and ACP in agreement with POC.
Pt. seated in chair at bedside upon SLP arrival this AM. Pt. receiving supplemental O2 via NC. Wet cough noted at baseline. Pt. AAOx3.    Per chart:   "89M PMHx A. fib on Eliquis, CHF (EF 45-50%, combined systolic and diastolic), CVA, HTN, COPD, CKD3, multiple hospitalizations for CHF exacerbation in the prior months, left sided pleural effusion that was drained last hospitalization, admitted to [facility] on 2/6/2024 for CHF exacerbation and acute on chronic hypoxic respiratory failure 2/2 large left pleural effusion. Patient transfered to [facility] for PleurX catheter and possible EUS for lymph node biopsy."    -WBC WNL per lab review (2/19)  -CXR: "There is increase in the left pleural effusion now moderate to large with associated consolidation. Small right pleural effusion."  -CT CHEST: "2.5 x 1.4 cm right retroareolar breast nodule larger than 9/19/2023. Recommend ultrasound evaluation and surgery consultation.  Lingular and left lower lobe groundglass infiltrates likely pneumonia. Small bilateral pleural effusions. Cardiomegaly. Stable prominent mediastinal lymph nodes. Redemonstrated asymmetrical gynecomastia."    Pt. with limited report of swallow function, however; reported no difficulty PTA but currently has difficulty. Pt. endorsed "my throat doesn't work".

## 2024-02-26 NOTE — PROGRESS NOTE ADULT - PROBLEM SELECTOR PLAN 8
- TTE 2/7 demonstrating EF 55-60%, moderate pulmonary hypertension, moderate mitral stenosis, severely enlarged b/l atria   - hold IV lasix due to IRINEO- monitor renal function, resume when able  - Monitor I/Os

## 2024-02-27 NOTE — PROGRESS NOTE ADULT - PROBLEM SELECTOR PLAN 1
- Patient admitted for acute hypoxic respiratory failure 2/2 recurrence of large left pleural effusion   - CT chest 2/8 with lingular and left lower lobe groundglass infiltrates likely pneumonia. Small bilateral pleural effusions. Cardiomegaly. Stable prominent mediastinal lymph nodes  - CXR 2/16 demonstrating increase in the left pleural effusion now moderate to large with associated consolidation  - Thoracic surgery consulted - s/p bedside PTC placement 2/24   -was on CTX since 2/20- new RML/RLL opacity on x-ray 2/23- RRT 2/24 with hypoxemia- escalated to IV Zosyn  -s/p 5days of Remdesevir for covid+  -started dexamethasone for covid 2/24 -- plan for 10d course  -chest PT, hypersal nebs, duonebs  -c/w HFNC, wean as tolerated --> trial of 6L NC when 40/40  -Pulmonary consulted, appreciate recs  -GOC as below

## 2024-02-27 NOTE — PROVIDER CONTACT NOTE (OTHER) - SITUATION
pt profusely bleeding from old iv site. pressure dressing applied. heparin held and to be readmin at 2 pm upon ability to tolerate. no s/s of distress noted

## 2024-02-27 NOTE — PROGRESS NOTE ADULT - SUBJECTIVE AND OBJECTIVE BOX
[facility] Department of Hospital Medicine  Cornelia Downing DO  Available on MS Teams  Pager: [phone]    Patient is a 89y old  Male who presents with a chief complaint of Transfer for PleurX (26 Feb 2024 14:06)    Subjective:  Pt seen and examined at bedside in no acute distress. RN bedside along with ACP d/t skin lac on upper extremity. Heparin held d/t continued bleeding, will plan to restart later today.    Vital Signs Last 24 Hrs  T(C): 36.7 (27 Feb 2024 12:30), Max: 36.7 (27 Feb 2024 12:30)  T(F): 98.1 (27 Feb 2024 12:30), Max: 98.1 (27 Feb 2024 12:30)  HR: 86 (27 Feb 2024 15:13) (77 - 90)  BP: 148/86 (27 Feb 2024 12:30) (126/64 - 148/86)  BP(mean): --  RR: 20 (27 Feb 2024 15:13) (20 - 20)  SpO2: 96% (27 Feb 2024 15:13) (94% - 100%)    Parameters below as of 27 Feb 2024 15:13  Patient On (Oxygen Delivery Method): nasal cannula, high flow  O2 Flow (L/min): 45  O2 Concentration (%): 45    PHYSICAL EXAM:  Constitutional: resting in bed; NAD; muffled speech/poor phonation  Head: NC/AT  Eyes: PERRL, EOMI, anicteric sclera  ENT: no nasal discharge; MMM  Neck: supple; no JVD  Respiratory: diffuse wheezing/crackles B/L; L pigtail with recently changed dressing c/d/i; on HFNC  Cardiac: +S1/S2; RRR; no M/R/G  Gastrointestinal: soft, NT/ND; no rebound or guarding; +BSx4  Extremities: WWP, no clubbing or cyanosis; no peripheral edema  Musculoskeletal: NROM x4; no joint swelling, tenderness or erythema  Vascular: 2+ radial, DP/PT pulses B/L  Dermatologic: skin warm, dry and intact; no rashes, wounds, or scars  Neurologic: AAOx2-3; CNII-XII grossly intact; no focal deficits    MEDICATIONS  (STANDING):  albuterol    90 MICROgram(s) HFA Inhaler 2 Puff(s) Inhalation every 6 hours  allopurinol 100 milliGRAM(s) Oral daily  aspirin enteric coated 81 milliGRAM(s) Oral daily  atorvastatin 40 milliGRAM(s) Oral at bedtime  budesonide 160 MICROgram(s)/formoterol 4.5 MICROgram(s) Inhaler 2 Puff(s) Inhalation two times a day  dexAMETHasone  Injectable 6 milliGRAM(s) IV Push daily  heparin  Infusion 800 Unit(s)/Hr (8 mL/Hr) IV Continuous <Continuous>  hydrocodone/homatropine Syrup 5 milliLiter(s) Oral every 6 hours  metoprolol succinate ER 50 milliGRAM(s) Oral daily  pantoprazole    Tablet 40 milliGRAM(s) Oral before breakfast  piperacillin/tazobactam IVPB.. 3.375 Gram(s) IV Intermittent every 12 hours  polyethylene glycol 3350 17 Gram(s) Oral two times a day  senna 2 Tablet(s) Oral at bedtime  sodium chloride 0.65% Nasal 1 Spray(s) Both Nostrils two times a day  sodium chloride 0.9% for Nebulization 3 milliLiter(s) Nebulizer every 6 hours  sodium chloride 0.9%. 1000 milliLiter(s) (75 mL/Hr) IV Continuous <Continuous>  tiotropium 2.5 MICROgram(s) Inhaler 2 Puff(s) Inhalation daily    MEDICATIONS  (PRN):  acetaminophen     Tablet .. 650 milliGRAM(s) Oral every 6 hours PRN Temp greater or equal to 38C (100.4F), Mild Pain (1 - 3)    LABS:                        10.0   17.12 )-----------( 227      ( 27 Feb 2024 04:38 )             31.6     02-27    148<H>  |  103  |  112<H>  ----------------------------<  134<H>  4.0   |  31  |  2.72<H>    Ca    8.7      27 Feb 2024 04:38  Phos  5.1     02-27  Mg     3.10     02-27      PTT - ( 27 Feb 2024 04:38 )  PTT:48.7 sec  Urinalysis Basic - ( 27 Feb 2024 04:38 )    Color: x / Appearance: x / SG: x / pH: x  Gluc: 134 mg/dL / Ketone: x  / Bili: x / Urobili: x   Blood: x / Protein: x / Nitrite: x   Leuk Esterase: x / RBC: x / WBC x   Sq Epi: x / Non Sq Epi: x / Bacteria: x      CAPILLARY BLOOD GLUCOSE      POCT Blood Glucose.: 121 mg/dL (26 Feb 2024 09:35)      RADIOLOGY & ADDITIONAL TESTS: Reviewed.

## 2024-02-27 NOTE — PROGRESS NOTE ADULT - SUBJECTIVE AND OBJECTIVE BOX
Cardiovascular Disease Progress Note  Date of Service: 24 @ 09:17    Overnight events: NSVT noted overnight.    The patient denies chest pain or SOB.   Otherwise review of systems negative    Objective Findings:  T(C): 36.6 (24 @ 05:10), Max: 36.6 (24 @ 05:10)  HR: 86 (24 @ 05:10) (80 - 90)  BP: 126/82 (24 @ 05:10) (126/64 - 142/73)  RR: 20 (24 @ 05:10) (18 - 20)  SpO2: 100% (24 @ 05:10) (98% - 100%)  Wt(kg): --  Daily     Daily Weight in k.9 (2024 05:10)      Physical Exam:  Gen: NAD; Patient resting comfortably  HEENT: Normocephalic/ atraumatic  CV: RRR, normal S1 + S2, no m/r/g  Lungs:  Normal respiratory effort; decreased air entry to auscultation bilaterally  Abd: soft, non-tender; bowel sounds present  Ext:  warm and well perfused    Telemetry: A-fib; 14 beats NSVT    Laboratory Data:                        10.0   17.12 )-----------( 227      ( 2024 04:38 )             31.6         148<H>  |  103  |  112<H>  ----------------------------<  134<H>  4.0   |  31  |  2.72<H>    Ca    8.7      2024 04:38  Phos  5.1       Mg     3.10           PTT - ( 2024 04:38 )  PTT:48.7 sec          Inpatient Medications:  MEDICATIONS  (STANDING):  albuterol    90 MICROgram(s) HFA Inhaler 2 Puff(s) Inhalation every 6 hours  allopurinol 100 milliGRAM(s) Oral daily  aspirin enteric coated 81 milliGRAM(s) Oral daily  atorvastatin 40 milliGRAM(s) Oral at bedtime  budesonide 160 MICROgram(s)/formoterol 4.5 MICROgram(s) Inhaler 2 Puff(s) Inhalation two times a day  dexAMETHasone  Injectable 6 milliGRAM(s) IV Push daily  heparin  Infusion 800 Unit(s)/Hr (8 mL/Hr) IV Continuous <Continuous>  hydrocodone/homatropine Syrup 5 milliLiter(s) Oral every 6 hours  metoprolol succinate ER 50 milliGRAM(s) Oral daily  pantoprazole    Tablet 40 milliGRAM(s) Oral before breakfast  piperacillin/tazobactam IVPB.. 3.375 Gram(s) IV Intermittent every 12 hours  polyethylene glycol 3350 17 Gram(s) Oral two times a day  senna 2 Tablet(s) Oral at bedtime  sodium chloride 0.65% Nasal 1 Spray(s) Both Nostrils two times a day  sodium chloride 0.9% for Nebulization 3 milliLiter(s) Nebulizer every 6 hours  sodium chloride 0.9%. 1000 milliLiter(s) (75 mL/Hr) IV Continuous <Continuous>  tiotropium 2.5 MICROgram(s) Inhaler 2 Puff(s) Inhalation daily      Assessment: 89 year old man with a-fib, HTN, prior CVA, and mitral stenosis presents with acute diastolic CHF and pleural effusion complicated by COVID-19.     Plan of Care:    #Acute diastolic CHF-  Associated with progressive hypoxic respiratory failure now on HFNC  Patient also with R sided Pna and mucus plugging- Pulm input reviewed.   Diuretics held for acute on chronic renal disease.   Poor overall prognosis.       #A-fib-  Associated with moderate mitral stenosis.  Rates are controlled on Lopressor.  Patient challenged with heparin gtt post chest tube.     #COVID-19-  Management as per the pulmonary team.              Over 55 minutes spent on total encounter; more than 50% of the visit was spent counseling and/or coordinating care by the attending physician.      Rey Crowley MD [facility]  Cardiovascular Disease  [phone] Cardiovascular Disease Progress Note  Date of Service: 24 @ 09:17    Overnight events: NSVT noted overnight.    The patient denies chest pain or SOB.   Otherwise review of systems negative    Objective Findings:  T(C): 36.6 (24 @ 05:10), Max: 36.6 (24 @ 05:10)  HR: 86 (24 @ 05:10) (80 - 90)  BP: 126/82 (24 @ 05:10) (126/64 - 142/73)  RR: 20 (24 @ 05:10) (18 - 20)  SpO2: 100% (24 @ 05:10) (98% - 100%)  Wt(kg): --  Daily     Daily Weight in k.9 (2024 05:10)      Physical Exam:  Gen: NAD; Patient resting comfortably  HEENT: Normocephalic/ atraumatic  CV: RRR, normal S1 + S2, no m/r/g  Lungs:  Normal respiratory effort; decreased air entry to auscultation bilaterally  Abd: soft, non-tender; bowel sounds present  Ext:  warm and well perfused    Telemetry: A-fib; 14 beats NSVT    Laboratory Data:                        10.0   17.12 )-----------( 227      ( 2024 04:38 )             31.6         148<H>  |  103  |  112<H>  ----------------------------<  134<H>  4.0   |  31  |  2.72<H>    Ca    8.7      2024 04:38  Phos  5.1       Mg     3.10           PTT - ( 2024 04:38 )  PTT:48.7 sec          Inpatient Medications:  MEDICATIONS  (STANDING):  albuterol    90 MICROgram(s) HFA Inhaler 2 Puff(s) Inhalation every 6 hours  allopurinol 100 milliGRAM(s) Oral daily  aspirin enteric coated 81 milliGRAM(s) Oral daily  atorvastatin 40 milliGRAM(s) Oral at bedtime  budesonide 160 MICROgram(s)/formoterol 4.5 MICROgram(s) Inhaler 2 Puff(s) Inhalation two times a day  dexAMETHasone  Injectable 6 milliGRAM(s) IV Push daily  heparin  Infusion 800 Unit(s)/Hr (8 mL/Hr) IV Continuous <Continuous>  hydrocodone/homatropine Syrup 5 milliLiter(s) Oral every 6 hours  metoprolol succinate ER 50 milliGRAM(s) Oral daily  pantoprazole    Tablet 40 milliGRAM(s) Oral before breakfast  piperacillin/tazobactam IVPB.. 3.375 Gram(s) IV Intermittent every 12 hours  polyethylene glycol 3350 17 Gram(s) Oral two times a day  senna 2 Tablet(s) Oral at bedtime  sodium chloride 0.65% Nasal 1 Spray(s) Both Nostrils two times a day  sodium chloride 0.9% for Nebulization 3 milliLiter(s) Nebulizer every 6 hours  sodium chloride 0.9%. 1000 milliLiter(s) (75 mL/Hr) IV Continuous <Continuous>  tiotropium 2.5 MICROgram(s) Inhaler 2 Puff(s) Inhalation daily      Assessment: 89 year old man with a-fib, HTN, prior CVA, and mitral stenosis presents with acute diastolic CHF and pleural effusion complicated by COVID-19.     Plan of Care:    #Acute diastolic CHF-  Associated with progressive hypoxic respiratory failure now on HFNC  Patient also with R sided PNA and mucus plugging- Pulm input reviewed.   Diuretics held for acute on chronic renal disease.   Poor overall prognosis.     #NSVT-  Noted on  in the setting of hypoxia.  Continue Lopressor as ordered.       #A-fib-  Associated with moderate mitral stenosis.  Rates are controlled on Lopressor.  Patient challenged with heparin gtt post chest tube.     #COVID-19-  Management as per the pulmonary team.         Over 55 minutes spent on total encounter; more than 50% of the visit was spent counseling and/or coordinating care by the attending physician.      Rey Crowley MD [facility]  Cardiovascular Disease  [phone]

## 2024-02-27 NOTE — PROGRESS NOTE ADULT - PROBLEM SELECTOR PLAN 3
Spoke with daughter Frida ([phone] ) via telephone - says she herself as health issues with hx brain surgery, now in PT program and doing better; has another sister Lora who plans to visit , flight lands at [facility] at 12p and she is coming straight to the hospital. Had a brother who is now . Mother "left pt 50y ago but has now returned as she promised to care for the kids if anything happened to pt"; they have been  for 50 years. Pt has a girlfriend Antonietta at present. HCP is shared between both daughters. Frida states she and Lora have discussed plan for FULL CODE until Lora is able to visit, after which they likely would switch to DNR/DNI. Will have further GOC discussion in person  planned tentatively for 2pm.  -palliative following, appreciate recs

## 2024-02-27 NOTE — PROGRESS NOTE ADULT - ASSESSMENT
Pt is a 89 yo M with PMH CVA, a-fib (eliquis), CHF (EF 45-50%), HTN, HLD, COPD, and CKD3 p/w SOB. Found to have L pleural effusion and COVID s/p remdesivir. Course c/b worsening hypoxia, started on zosyn and steroids, now on HFNC with pulm and CT Sx following s/p PTC 2/23. On heparin for a-fib with intermittent bleeding (chest tube site, skin lac) briefly held, now with pt specific PTT 40-60 goal. Pending [facility] meeting with pt's daughters 2/28, palliative following. Also with worsening Cr, for which renal is following and pt pending LE dopplers and renal US.

## 2024-02-27 NOTE — PROGRESS NOTE ADULT - SUBJECTIVE AND OBJECTIVE BOX
Pt seen. Resting in bed. Lethargic on high flow  Appears comfortable.     Vital Signs Last 24 Hrs  T(C): 36.7 (27 Feb 2024 12:30), Max: 36.7 (27 Feb 2024 12:30)  T(F): 98.1 (27 Feb 2024 12:30), Max: 98.1 (27 Feb 2024 12:30)  HR: 86 (27 Feb 2024 15:13) (77 - 90)  BP: 148/86 (27 Feb 2024 12:30) (126/64 - 148/86)  BP(mean): --  RR: 20 (27 Feb 2024 15:13) (20 - 20)  SpO2: 96% (27 Feb 2024 15:13) (94% - 100%)    Parameters below as of 27 Feb 2024 15:13  Patient On (Oxygen Delivery Method): nasal cannula, high flow  O2 Flow (L/min): 45  O2 Concentration (%): 45    Lethargic, not conversant.   On high flow  Left PTC with DSD in place, dressing dry. No   signs of bleeding. Left dressing with surgicel in place.   Left PTC to waterseal, draining 125cc serosang fluid  CXR stable.     A/P: 89M with PMHx A. fib previously on Eliquis, CHF (EF 45-50%, combined systolic and diastolic), CVA, HTN, COPD, CKD3, multiple hospitalizations for CHF exacerbation in the prior months, left sided pleural effusion that was drained last hospitalization, admitted to [facility] on 2/6/2024 for CHF exacerbation and acute on chronic hypoxic respiratory failure 2/2 large left pleural effusion. TTE on 2/7 showed EF 55-60%, pulmonary hypertension, moderate mitral stenosis. He was evaluated by Pulmonary and had Thoracentesis on 2/8, 1 L bloody output drained.  Pulmonology recommended transfer to [facility] for Pleurex catheter and possible EUS for lymph node biopsy.  Patient placed on IV lasix for diuresis. Patient tested COVID+ on 2/19   L PTC placed  new R sided PNA    -Cont PTC to waterseal. No plans for thoracic surgical intervention.  -Document output Q shift  -Daily CXR  -Cont to have lower target PTT, oozing has stopped from PTC insertion site.   -Pt awaiting GOC and DNR decision with family.  -Cont care per medical team.   Will follow. Above as per DR. Garza .

## 2024-02-28 NOTE — RAPID RESPONSE TEAM SUMMARY - NSSITUATIONBACKGROUNDRRT_GEN_ALL_CORE
Pt is a 89 yo M with PMH CVA, a-fib (eliquis), CHF (EF 45-50%), HTN, HLD, COPD, and CKD3 p/w SOB. Found to have L pleural effusion and COVID s/p remdesivir. Course c/b worsening hypoxia, started on zosyn and steroids, now on HFNC with pulm and CT Sx following s/p PTC 2/23. On heparin for a-fib with intermittent bleeding (chest tube site, skin lac) briefly held, now with pt specific PTT 40-60 goal. GOC meeting with patient's daughter with DNR/DNI from today 2/28. IV diuretics stopped due to IRINEO.    RRT called for worsening hypoxia, mental status has been waxing and waning over the past few days not worse since onset of hypoxia. Pt was on HFNC 40L/40% and the cannula became dislodged and pt desaturated to the 70s and did not improve even after replacement. On arrival of RRT team pt had normal HR with mild hypertension, O2 was in 70s, pt was lethargic but would open eyes on command, intermittently following commands with moving hands and feet (Per nursing similar to earlier in the day). Breath sounds present bilaterally but with diffuse rales, no air leak seen through pigtail catheter and per Nursing at bedside has been draining well with about 100cc out per shift without signs of blockage or kinking.     HFNC increased to 100% and 60L with NRB on 15L over it, with no improvement in O2 saturation, STAT CXR obtained with slight worsening of patchy bilateral opacities especially in the R base and no large pneumothorax or increase in pleural effusion. CPAP started at PEEP 8 and O2 100% and O2 saturations increased to 94%, decreased FiO2 to 80% to target goal 88-92 with his hx COPD. Primary team at bedside to discuss restarting IV diuretics with attending despite worsening IRINEO. ABG obtained, primary team comfortable following up result and switching to biPAP 12/8 if pCO2 is increased relative to baseline. Pulm was also at bedside and did not find any blockages or kinks in the pleural catheter, was briefly placed to suction without large airleak to indicate pneumothorax so placed back on water seal.

## 2024-02-28 NOTE — PROGRESS NOTE ADULT - ASSESSMENT
Patient is a 89M PMHx A. fib previously on Eliquis, CHF (EF 45-50%, combined systolic and diastolic), CVA, HTN, COPD, CKD3, multiple hospitalizations for CHF exacerbation in the prior months, left sided pleural effusion that was drained last hospitalization, admitted to [facility] on 2/6/2024 for CHF exacerbation and acute on chronic hypoxic respiratory failure 2/2 large left pleural effusion. TTE on 2/7 showed EF 55-60%, pulmonary hypertension, moderate mitral stenosis. He was evaluated by Pulmonary and had Thoracentesis on 2/8, 1 L bloody output drained.  Pulmonology recommended transfer to [facility] for Pleurex catheter and possible EUS for lymph node biopsy.  Patient placed on IV lasix for diuresis.  Pt was also found with 2.5 x 1.4 cm right retroareolar breast nodule larger than 9/19/2023.   Surgery was consulted and recommended outpatient follow-up. Patient also found with LLE blister, which is chronic.  Pt also with IRINEO 2/2 CKD3 on this admission, with Hypernatremia and Hypokalemia, K was repleted. Patient transitioned from Eliquis to heparin gtt during admission due to valvular heart disease. Patient was also evaluated by cardiology for moderate mitral stenosis, CHF, and a. fib, was diuresed with IV Lasix, treated with metoprolol, and recommended for structural heart evaluation knowing that patient is a poor candidate for procedural intervention due to comorbidities. Patient was then [facility]. Of note, patient was recently admitted in 1/2024 presenting with concern for LE blisters, found to have acute on chronic CHF, large exudative pleural effusion s/p 1L removal, transferred from  for thoracic surgery evaluation and was found to be a poor surgical candidate. IR was consulted and recommended that patient had no indication for a pigtail catheter placement due to resolution of dyspnea. Patient was then discharged. On interview, patient denies chest pain, abdominal pain, reports difficulty urinating, and reports breathing is manageable. Patient endorses productive cough. Patient reports being willing to undergo thoracentesis or PleurX catheter.

## 2024-02-28 NOTE — PROGRESS NOTE ADULT - PROBLEM SELECTOR PLAN 1
likely 2/2 overdiuresis in setting of decreased oral intake and infection.  -as per RN, patient is voiding  -atrophic left kidney with b/l cysts, no hydro or retention noted on renal/bladder sono  -Abx for pneumonia  -would continue to hold diuresis    GOC meeting with family pending today.

## 2024-02-28 NOTE — PROGRESS NOTE ADULT - ATTENDING COMMENTS
Patient seen and examined at bedside. Agree with above assessment and plan Patient seen and examined at bedside. Agree with above assessment and plan    Patient with worsening acute hypoxemic respiratory failure on HFNC, s/p L sided chest tube, with persistent serosanguinous fluid drainage. Overall prognosis poor. Mental status worse today. Family agreed to DNR/DNI.

## 2024-02-28 NOTE — PROGRESS NOTE ADULT - SUBJECTIVE AND OBJECTIVE BOX
Cardiovascular Disease Progress Note  Date of Service: 24 @ 09:28    Overnight events: No acute events overnight.    The patient is in no distress on HFNC.        Objective Findings:  T(C): 36.3 (24 @ 05:00), Max: 36.7 (24 @ 12:30)  HR: 93 (24 @ 05:00) (77 - 93)  BP: 137/81 (24 @ 05:00) (133/61 - 148/86)  RR: 21 (24 @ 05:00) (20 - 23)  SpO2: 98% (24 @ 05:00) (94% - 100%)  Wt(kg): --  Daily     Daily Weight in k.5 (2024 05:00)      Physical Exam:  Gen: NAD; Patient resting comfortably  HEENT: EOMI, Normocephalic/ atraumatic  CV: IIR, normal S1 + S2, no m/r/g  Lungs:  Normal respiratory effort; decreased air entry to auscultation bilaterally  Abd: soft, non-tender; bowel sounds present  Ext: No edema; warm and well perfused    Telemetry: A-fib 80s    Laboratory Data:                        9.8    18.53 )-----------( 218      ( 2024 02:45 )             31.9         148<H>  |  103  |  117<H>  ----------------------------<  131<H>  4.5   |  31  |  2.90<H>    Ca    8.7      2024 02:45  Phos  5.3       Mg     3.30           PTT - ( 2024 02:45 )  PTT:56.1 sec          Inpatient Medications:  MEDICATIONS  (STANDING):  albuterol    90 MICROgram(s) HFA Inhaler 2 Puff(s) Inhalation every 6 hours  allopurinol 100 milliGRAM(s) Oral daily  aspirin enteric coated 81 milliGRAM(s) Oral daily  atorvastatin 40 milliGRAM(s) Oral at bedtime  budesonide 160 MICROgram(s)/formoterol 4.5 MICROgram(s) Inhaler 2 Puff(s) Inhalation two times a day  dexAMETHasone  Injectable 6 milliGRAM(s) IV Push daily  heparin  Infusion 800 Unit(s)/Hr (8.5 mL/Hr) IV Continuous <Continuous>  hydrocodone/homatropine Syrup 5 milliLiter(s) Oral every 6 hours  metoprolol succinate ER 50 milliGRAM(s) Oral daily  pantoprazole    Tablet 40 milliGRAM(s) Oral before breakfast  piperacillin/tazobactam IVPB.. 3.375 Gram(s) IV Intermittent every 12 hours  polyethylene glycol 3350 17 Gram(s) Oral two times a day  senna 2 Tablet(s) Oral at bedtime  sodium chloride 0.65% Nasal 1 Spray(s) Both Nostrils two times a day  sodium chloride 0.9% for Nebulization 3 milliLiter(s) Nebulizer every 6 hours  sodium chloride 0.9%. 1000 milliLiter(s) (75 mL/Hr) IV Continuous <Continuous>  tiotropium 2.5 MICROgram(s) Inhaler 2 Puff(s) Inhalation daily      Assessment: 89 year old man with a-fib, HTN, prior CVA, and mitral stenosis presents with acute diastolic CHF and pleural effusion complicated by COVID-19.     Plan of Care:    #Acute diastolic CHF-  Associated with progressive hypoxic respiratory failure now on HFNC  Patient also with R sided PNA and mucus plugging- Pulm input reviewed.   Diuretics held for acute on chronic renal disease.   Poor overall prognosis.     #NSVT-  Noted on  in the setting of hypoxia.  Continue Lopressor as ordered.       #A-fib-  Associated with moderate mitral stenosis.  Rates are controlled on Lopressor.  Patient challenged with heparin gtt post chest tube.     #COVID-19-  Management as per the pulmonary team.         Over 55 minutes spent on total encounter; more than 50% of the visit was spent counseling and/or coordinating care by the attending physician.      Rey Crowley MD [facility]  Cardiovascular Disease  [phone]

## 2024-02-28 NOTE — PROGRESS NOTE ADULT - CONVERSATION DETAILS
Daughters both present for [facility] discussion with medicine and palliative team.  Explained course of current hospitalization, worsening mental status and minimal improvement with left chest tube drain.  Patient too ill to partake in discussion with team, also has poor mental status.  Daughters feel that DNR/DNI is reasonable considering his advanced age, the fact that he already has one lung compromised and is unlikely to make a meaningful recovery if he goes into cardiac arrest with his present burden of illness and lack of improvement.  They are not yet ready for comfort measures and wish to continue the current interventions and treatment course. Family meeting today with patient's daughters, son in law and grandson for [facility] discussion with medicine and palliative team. Patient's daughter Lora flew in from [place] this afternoon. Explained in depth course of current hospitalization, worsening mental status and minimal improvement with left chest tube drain.  Patient too ill to partake in discussion with team, also has poor mental status. Discussed patient may not survive and his current delirium is a sign of this. Discussed code status, recommended DNR/DNI as CPR/intubation have poor outcomes in a patient with such serious illness and add more burdens at end of life.  Family initially unsure of decision, but they state patient was the type who did not like being in the hospital and would AMA at times. He has been intubated in the past and he has rebounded before, but they also admit he has become more sick recently with recurrent hospitalizations (this is 8th admission in one year). Once he felt better, he would want to leave. 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 Family ultimately agreed to DNR/DNI. However they do wish to continue the current interventions and treatment course.

## 2024-02-28 NOTE — PROGRESS NOTE ADULT - PROBLEM SELECTOR PLAN 1
- Patient admitted for acute hypoxic respiratory failure 2/2 recurrence of large left pleural effusion   - CT chest 2/8 with lingular and left lower lobe groundglass infiltrates likely pneumonia. Small bilateral pleural effusions. Cardiomegaly. Stable prominent mediastinal lymph nodes  - CXR 2/16 demonstrating increase in the left pleural effusion now moderate to large with associated consolidation  - Thoracic surgery consulted - s/p bedside PTC placement 2/24; likely no further intervention planned   -was on CTX since 2/20- new RML/RLL opacity on x-ray 2/23- RRT 2/24 with hypoxemia- escalated to IV Zosyn  -s/p 5days of Remdesevir for covid+  -started dexamethasone for covid 2/24 -- plan for 10d course  -chest PT, hypersal nebs, duonebs  -c/w HFNC, wean as tolerated --> trial of 6L NC when 40/40   -Pulmonary consulted, appreciate recs  -GOC as below

## 2024-02-28 NOTE — PROGRESS NOTE ADULT - PROBLEM SELECTOR PLAN 3
- GOC discussed with family as above  - ESTHER placed in chart - 2/28- [facility] discussed with family as above. Patient's two daughters agreed to DNR/DNI, MOLST placed in chart

## 2024-02-28 NOTE — PROGRESS NOTE ADULT - PROBLEM SELECTOR PLAN 4
- s/p multiple thoracenteses with most recent thoracentesis resulting in removal of 1L of serosanguinous fluid   - Cytopathology negative for malignant cells; pleural fluid from 2/8 exudative by Light's criteria; pleural fluid Cx unremarkable   - Thoracic Surgery consulted, f/u recs --> likely no plan for further intervention   -s/p bedside PTC placement 2/24  -c/b bleeding/dressing saturation and PTC site --> now resolved; plan for pt specific PTT goal on heparin (for a-fib) given risk of bleeding outweighing benefit, goal PTT 40-60

## 2024-02-28 NOTE — CHART NOTE - NSCHARTNOTEFT_GEN_A_CORE
discussed with RN - plan to reschedule / hold PO medication at this time as patient lethargic and on continuous BIPAP   Continue to monitor clinically   If patient more awake / Alert will reevaluation   - ABG ordered, will draw PTT and ABG at 7:20pm

## 2024-02-28 NOTE — CHART NOTE - NSCHARTNOTEFT_GEN_A_CORE
Pt seen and examined. Remains lethargic,   on high flow. Appears comfortable.     Vital Signs Last 24 Hrs  T(C): 36.5 (28 Feb 2024 13:38), Max: 36.6 (28 Feb 2024 02:45)  T(F): 97.7 (28 Feb 2024 13:38), Max: 97.9 (28 Feb 2024 02:45)  HR: 98 (28 Feb 2024 13:38) (80 - 98)  BP: 154/77 (28 Feb 2024 13:38) (133/61 - 154/77)  BP(mean): --  RR: 20 (28 Feb 2024 13:38) (20 - 23)  SpO2: 97% (28 Feb 2024 13:38) (94% - 98%)    Parameters below as of 28 Feb 2024 13:38  Patient On (Oxygen Delivery Method): nasal cannula, high flow  O2 Flow (L/min): 40  O2 Concentration (%): 40    Lethargic  Left PTC site remains with dry dressing in place.   PTC output about 100cc/24hrs. Serosang fluid  On waterseal, no air leak noted.   CXR remains stable.     A/P: 89M with PMHx A. fib previously on Eliquis, CHF (EF 45-50%, combined systolic and diastolic), CVA, HTN, COPD, CKD3, multiple hospitalizations for CHF exacerbation in the prior months, left sided pleural effusion that was drained last hospitalization, admitted to [facility] on 2/6/2024 for CHF exacerbation and acute on chronic hypoxic respiratory failure 2/2 large left pleural effusion. TTE on 2/7 showed EF 55-60%, pulmonary hypertension, moderate mitral stenosis. He was evaluated by Pulmonary and had Thoracentesis on 2/8, 1 L bloody output drained.  Pulmonology recommended transfer to [facility] for Pleurex catheter and possible EUS for lymph node biopsy.  Patient placed on IV lasix for diuresis. Patient tested COVID+ on 2/19   L PTC placed  new R sided PNA    -Cont PTC to waterseal. No plans for thoracic surgical intervention.  -Document output Q shift  -Daily CXR  -Cont to have lower target PTT, oozing has stopped from PTC insertion site.   -Pt awaiting GOC and DNR decision with family.  -Cont care per medical team.   Will follow. Above as per DR. Garza .

## 2024-02-28 NOTE — PROGRESS NOTE ADULT - CONVERSATION DETAILS
Family meeting held at 2pm with palliative team in visitor room 7N with daughters Frida and Lora along with Frida's  and son present. Lora contacted her daughter as well via telephone (resides in [place]). All in agreement for DNR/DNI status as pt unlikely to have meaningful recovery in the event of cardiac arrest/respiratory arrest, and would not want to cause/prolong suffering or need to make a decision to discontinue life support. Also in agreement that pt would not want to live on a machine or have such invasive measures, as he always "runs from the hospital the second he feels better". All seem to understand pt poor prognosis with goal of keeping him alive long enough for [facility] in [place] to visit.

## 2024-02-28 NOTE — PROGRESS NOTE ADULT - SUBJECTIVE AND OBJECTIVE BOX
[facility] Department of Hospital Medicine  Cornelia Downing DO  Available on MS Teams  Pager: [phone]    Patient is a 89y old  Male who presents with a chief complaint of Transfer for PleurX (26 Feb 2024 14:06)    Subjective:  Pt seen and examined at bedside in no acute distress. Per RN, more awake/alert this AM expressing desire to make partner Antonietta HCP. However at time of my exam, minimally participatory, lethargic, unable to have meaningful conversation.  Family meeting held at 2pm with palliative team in visitor room 7N with daughters Frida and Lora along with Frida's  and son present. Lora contacted her daughter as well via telephone (resides in [place]). All in agreement for DNR/DNI status as pt unlikely to have meaningful recovery in the event of cardiac arrest/respiratory arrest, and would not want to cause/prolong suffering or need to make a decision to discontinue life support. Also in agreement that pt would not want to live on a machine or have such invasive measures, as he always "runs from the hospital the second he feels better". All seem to understand pt poor prognosis with goal of keeping him alive long enough for [facility] to visit.     Vital Signs Last 24 Hrs  T(C): 36.5 (28 Feb 2024 13:38), Max: 36.6 (28 Feb 2024 02:45)  T(F): 97.7 (28 Feb 2024 13:38), Max: 97.9 (28 Feb 2024 02:45)  HR: 98 (28 Feb 2024 13:38) (80 - 98)  BP: 154/77 (28 Feb 2024 13:38) (133/61 - 154/77)  BP(mean): --  RR: 20 (28 Feb 2024 13:38) (20 - 23)  SpO2: 97% (28 Feb 2024 13:38) (94% - 98%)    Parameters below as of 28 Feb 2024 13:38  Patient On (Oxygen Delivery Method): nasal cannula, high flow  O2 Flow (L/min): 40  O2 Concentration (%): 40    PHYSICAL EXAM:  Constitutional: resting in bed; NAD; muffled speech/poor phonation; lethargic  Head: NC/AT  Eyes: PERRL, EOMI, anicteric sclera  ENT: no nasal discharge; MMM  Neck: supple; no JVD  Respiratory: diffuse wheezing/crackles B/L; L pigtail with recently changed dressing c/d/i; on HFNC  Cardiac: +S1/S2; RRR; no M/R/G  Gastrointestinal: soft, NT/ND; no rebound or guarding; +BSx4  Extremities: WWP, no clubbing or cyanosis; no peripheral edema  Musculoskeletal: moving extremities spontaneously; non-participatory with exam  Dermatologic: skin warm, dry and intact   Neurologic: AAOx0; CNII-XII grossly intact     MEDICATIONS  (STANDING):  albuterol    90 MICROgram(s) HFA Inhaler 2 Puff(s) Inhalation every 6 hours  allopurinol 100 milliGRAM(s) Oral daily  aspirin enteric coated 81 milliGRAM(s) Oral daily  atorvastatin 40 milliGRAM(s) Oral at bedtime  budesonide 160 MICROgram(s)/formoterol 4.5 MICROgram(s) Inhaler 2 Puff(s) Inhalation two times a day  dexAMETHasone  Injectable 6 milliGRAM(s) IV Push daily  furosemide   Injectable 40 milliGRAM(s) IV Push once  heparin  Infusion 800 Unit(s)/Hr (8 mL/Hr) IV Continuous <Continuous>  hydrocodone/homatropine Syrup 5 milliLiter(s) Oral every 6 hours  metoprolol succinate ER 50 milliGRAM(s) Oral daily  pantoprazole    Tablet 40 milliGRAM(s) Oral before breakfast  piperacillin/tazobactam IVPB.. 3.375 Gram(s) IV Intermittent every 12 hours  polyethylene glycol 3350 17 Gram(s) Oral two times a day  senna 2 Tablet(s) Oral at bedtime  sodium chloride 0.65% Nasal 1 Spray(s) Both Nostrils two times a day  sodium chloride 0.9% for Nebulization 3 milliLiter(s) Nebulizer every 6 hours  tiotropium 2.5 MICROgram(s) Inhaler 2 Puff(s) Inhalation daily    MEDICATIONS  (PRN):  acetaminophen     Tablet .. 650 milliGRAM(s) Oral every 6 hours PRN Temp greater or equal to 38C (100.4F), Mild Pain (1 - 3)    LABS:                        9.8    18.53 )-----------( 218      ( 28 Feb 2024 02:45 )             31.9     02-28    148<H>  |  103  |  117<H>  ----------------------------<  131<H>  4.5   |  31  |  2.90<H>    Ca    8.7      28 Feb 2024 02:45  Phos  5.3     02-28  Mg     3.30     02-28      PTT - ( 28 Feb 2024 09:40 )  PTT:78.0 sec  Urinalysis Basic - ( 28 Feb 2024 02:45 )    Color: x / Appearance: x / SG: x / pH: x  Gluc: 131 mg/dL / Ketone: x  / Bili: x / Urobili: x   Blood: x / Protein: x / Nitrite: x   Leuk Esterase: x / RBC: x / WBC x   Sq Epi: x / Non Sq Epi: x / Bacteria: x      CAPILLARY BLOOD GLUCOSE          RADIOLOGY & ADDITIONAL TESTS: Reviewed.

## 2024-02-28 NOTE — RAPID RESPONSE TEAM SUMMARY - NSREASONFORCALLINGRRT_GEN_ALL_CORE
The following labs were labeled with appropriate pt sticker and tubed to lab:     [x] Blue     [x] Lavender   [] on ice  [x] Green/yellow  [x] Green/black [] on ice  [] Dewitte Poonam  [] on ice  [x] Yellow  [] Red  [] Type/ Screen  [] ABG  [] VBG    [] COVID-19 swab    [] Rapid  [] PCR  [] Flu swab  [] Peds Viral Panel     [] Urine Sample  [] Fecal Sample  [] Pelvic Cultures  [] Blood Cultures  [] X 2  [] STREP Cultures       Nadia Álvarez RN  07/08/23 7094
Hypoxia

## 2024-02-28 NOTE — PROGRESS NOTE ADULT - NS ATTEST RISK PROBLEM GEN_ALL_CORE FT
Patient is seriously ill with acute hypoxemic respiratory failure- progression of disease   High risk of complications, further morbidity and mortality   Decision for DNR/DNI

## 2024-02-28 NOTE — PROGRESS NOTE ADULT - PROBLEM SELECTOR PLAN 8
- TTE 2/7 demonstrating EF 55-60%, moderate pulmonary hypertension, moderate mitral stenosis, severely enlarged b/l atria   - lasix as above  - Monitor I/Os

## 2024-02-28 NOTE — PROGRESS NOTE ADULT - ASSESSMENT
89 y/o male with IRINEO on CKD (baseline Creatinine 1.5 12/2023), h/o CHF, being treated for pneumonia (?aspiration).

## 2024-02-28 NOTE — PROGRESS NOTE ADULT - PROBLEM SELECTOR PLAN 3
Family meeting held 2/28 with palliative team -- all in agreement for DNR/DNI status as pt unlikely to have meaningful recovery in the event of cardiac arrest/respiratory arrest, and would not want to cause/prolong suffering or need to make a decision to discontinue life support. All seem to understand pt poor prognosis with goal of keeping him alive long enough for [facility] to visit.   - MOLST updated  - DNR/DNI

## 2024-02-28 NOTE — PROGRESS NOTE ADULT - PROBLEM SELECTOR PLAN 1
Bloody Pleural effusion s/p CT with non-expandable lung  # COVID19 + - s/p RDV  # R Pneumonia with mucus plugging - s/p CTX    -Chest tube management and possible future procedures per CTS  - Pulm also following  -Continue to wean down HFNC, if can wean to 40/40 can switch to humidified NC at 6L  -C/w decadron for covid  -C/w diuresus  - Aspiration precautions as necessary  - Consider 0.5 mg q4h IV dilaudid prn for dyspnea if patient having distress from sob  - DVT ppx as tolerated.

## 2024-02-28 NOTE — PROGRESS NOTE ADULT - SUBJECTIVE AND OBJECTIVE BOX
HPI:  Patient is a 89M PMHx NANCY meza previously on Eliquis, CHF (EF 45-50%, combined systolic and diastolic), CVA, HTN, COPD, CKD3, multiple hospitalizations for CHF exacerbation in the prior months, left sided pleural effusion that was drained last hospitalization, admitted to [facility] on 2/6/2024 for CHF exacerbation and acute on chronic hypoxic respiratory failure 2/2 large left pleural effusion. He was evaluated by Pulmonary and had Thoracentesis on 2/8, 1 L bloody output drained.  Pulmonology recommended transfer to [facility] for Pleurex catheter and possible EUS for lymph node biopsy.  Patient placed on IV lasix for diuresis.  Pt was also found with 2.5 x 1.4 cm right retroareolar breast nodule larger than 9/19/2023.   Surgery was consulted and recommended outpatient follow-up. Pt also with IRINEO 2/2 CKD3 on this admission. Patient transitioned from Eliquis to heparin gtt during admission due to valvular heart disease. Patient was also evaluated by cardiology for moderate mitral stenosis, CHF, and a. fib, was diuresed with IV Lasix, treated with metoprolol, and recommended for structural heart evaluation knowing that patient is a poor candidate for procedural intervention due to comorbidities. Patient was then transferred to [facility] for pleurx catheter.  He was found to be COVID + on 2/19 and is s/p left chest tube insertion by thoracic on 2/23 with serosanguinous drainage (nonmalignant).  He has received remdesvir, steroids, and zosyn but continues to be dependent on HFNC with ongoing pleural drainage.  Palliative consulted for GOC.     PERTINENT PM/SXH:   Afib    Congestive heart failure (CHF)    CVA (cerebral vascular accident)    Hypertension, unspecified type    Chronic obstructive pulmonary disease (COPD)      No significant past surgical history      FAMILY HISTORY:    ------------------------------------------------------------------------------------------------------------  ITEMS NOT CHECKED ARE NOT PRESENT    SOCIAL HISTORY:   Living Situation: [ ]Home  [ ]Long term care  [x ]Rehab [ ]Other  Support: Daughters, girlfriend    Substance hx:  [ ]   Tobacco hx:  [x ]   Alcohol hx: [ ]   Family Hx substance abuse [x ]yes [ ]no    Mu-ism/Spirituality:  PCSSQ[Palliative Care Spiritual Screening Question]   Severity (0-10): 0  Score of 4 or > indicate consideration of Chaplaincy referral.  Chaplaincy Referral: [ ] yes [X ] refused [ ] following    Anticipatory Grief present?:  [x ] yes [ ] no  [ ] Deferred                      Other Referrals:    [ ]Hospice   [ ]Caregiver [place] Support  [ ]Child Life    [ ]Patient & Family Centered Care Referral  [ ]Holistic Therapy     ------------------------------------------------------------------------------------------------------------    PRESENT SYMPTOMS:     [X ] Unable to self-report      [X ] PAINADS     [X ] RDOS    [ ] No     [ ] Yes     Source if other than patient:  [ ]Family   [ ]Team     PAIN:   If blank, patient unable to specify     [ ]yes [x ]no    1. Location-   2. Radiation-    3. Quality-   4. Timing-   5. Minimal acceptable level/pain goal-   6. Aggravating factors-   7. QOL impact-     SYMPTOMS:   Dyspnea:                           [ ]Mild [ ]Moderate [x ]Severe  Anxiety:                             [ ]Mild [ ]Moderate [ ]Severe  Fatigue:                             [ ]Mild [ ]Moderate [ ]Severe  Nausea/Vomiting:              [ ]Mild [ ]Moderate [ ]Severe  Loss of appetite:                [ ]Mild [ ]Moderate [ ]Severe  Constipation:                     [ ]Mild [ ]Moderate [ ]Severe    Other Symptoms:  [X ]All other review of systems negative     -----------------------------------------------------------------------------------------------------------  ------------------------------------------------------------------------------------------------------------    FUNCTIONAL STATUS:     Baseline ADL (prior to admission):  [ ] Independent  [x] Moderate Assistance [ ] Dependent    Palliative Performance Score (current):     [x]PPSV2 < or = to 30%   [ ]artificial nutrition      NUTRITIONAL STATUS:     Protein Calorie Malnutrition Present:   [ ]mild   [ ]moderate or severe    Weight:   [ ]underweight (BMI 18.5 or less)   [ ]morbid obesity (BMI 30 or higher)   [ ]anasarca  [ ]significant weight loss     Height (cm): 167.6 (02-16-24 @ 02:40), 167.6 (02-06-24 @ 22:00), 167.6 (12-18-23 @ 09:52)  Weight (kg): 70.3 (02-16-24 @ 02:40), 70.3 (02-06-24 @ 22:00), 74.6 (01-04-24 @ 08:00)  BMI (kg/m2): 25 (02-16-24 @ 02:40), 25 (02-06-24 @ 22:00), 26.6 (01-04-24 @ 08:00)    ------------------------------------------------------------------------------------------------------------    PRIOR ADVANCE DIRECTIVES:    [ ] DNR/MOLST    [ ] Living Will    [ ] Health Care Proxy(s)    DECISION MAKER(s):  [ ] Patient    [x ] Surrogate(s) Daughter Frida Escoto, Rashmi Banegasgisell  [ ] HCP   [ ] Guardian             ------------------------------------------------------------------------------------------------------------  PHYSICAL EXAM:    GENERAL:  [ ]Cachexia  [x ] Frail  [x ]Awake  [ ]Oriented x   [ ]Lethargic  [ ]Unarousable  [x ]Verbal  [ ]Non-Verbal    BEHAVIORAL:   [ ] Anxiety  [ ] Delirium [x] Agitation [ ] Other    HEENT:   [x ]Normal   [ ]Dry mouth   [ ]ET Tube/Trach  [ ]Oral lesions    PULMONARY:   [ ]Clear              [ ]Tachypnea  [ ]Audible excessive secretions   [ ]Rhonchi        [ ]Right [ ]Left [ ]Bilateral  [ ]Crackles        [ ]Right [ ]Left [ ]Bilateral  [ ]Wheezing     [ ]Right [ ]Left [ ]Bilateral  [x ]Diminished breath sounds [ ]right [x ]left [ ]bilateral L CHEST WALL TUBE DRAINING SEROSANGUINOUS FLUID    CARDIOVASCULAR:    [ ]Regular [x ]Irregular [ ]Tachy  [ ]Abdirashid [ ]Murmur [ ]Other    GASTROINTESTINAL:  [x ]Soft  [ ]Distended   [ ]+BS  [ ]Non tender [ ]Tender  [ ]Other [ ]PEG [ ]OGT/ NGT      GENITOURINARY:  [ ]Normal [x ] Incontinent   [ ]Oliguria/Anuria   [ ]Aguiar    MUSCULOSKELETAL:   [ ]Normal   [x ]Weakness  [ ]Bed/Wheelchair bound [ ]Edema left leg edema > right    NEUROLOGIC:   [X]No focal deficits  [ ]Cognitive impairment  [ ]Dysphagia [ ]Dysarthria [ ]Paresis [ ]Other     SKIN:   [X ]Normal  [ ]Rash  [ ]Other RN NOTE ON SKIN REVIEWED IN CHART  [ ]Pressure ulcer(s)       Present on admission [ ]y [ ]n    ------------------------------------------------------------------------------------------------------------    LABS:  Complete Blood Count + Automated Diff in AM (02.28.24 @ 02:45)   IANC: 15.28: IANC (instrument absolute neutrophil count) is based on the instrument   calculation which may differ from ANC (manual absolute neutrophil count)   since it is based on the calculation from a manual differential. K/uL  Nucleated RBC #: 0.02 K/uL  WBC Count: 18.53 K/uL  RBC Count: 3.19 M/uL  Hemoglobin: 9.8 g/dL  Hematocrit: 31.9 %  Mean Cell Volume: 100.0 fL  Mean Cell Hemoglobin: 30.7 pg  Mean Cell Hemoglobin Conc: 30.7 gm/dL  Red Cell Distrib Width: 15.7 %  Platelet Count - Automated: 218 K/uL  Auto Neutrophil #: 15.28 K/uL  Auto Lymphocyte #: 1.13 K/uL  Auto Monocyte #: 1.64 K/uL  Auto Eosinophil #: 0.00 K/uL  Auto Basophil #: 0.05 K/uL  Auto Neutrophil %: 82.4: Differential percentages must be correlated with absolute numbers for   clinical significance. %  Auto Lymphocyte %: 6.1 %  Auto Monocyte %: 8.9 %  Auto Eosinophil %: 0.0 %  Auto Basophil %: 0.3 %  Auto Immature Granulocyte %: 2.3    CMP 2/28/24  creatinine secretion. mL/min/1.73m2  Sodium: 148 mmol/L  Potassium: 4.5 mmol/L  Chloride: 103 mmol/L  Carbon Dioxide: 31 mmol/L  Anion Gap: 14 mmol/L  Blood Urea Nitrogen: 117 mg/dL  Creatinine: 2.90 mg/dL  Glucose: 131 mg/dL  Calcium: 8.7 mg/dL  Magnesium: 3.30 mg/dL  Phosphorus: 5.3 mg/dL               ------------------------------------------------------------------------------------------------------------    CRITICAL CARE:  [ ]Shock Present  [ ]Septic [ ]Cardiogenic [ ]Neurologic [ ]Hypovolemic [ ] Undifferentiated/Mixed   [ ]Vasopressors [ ]Inotropes     [X ]Respiratory failure present: [ ]Acute  [ ]Chronic [ ]Hypoxic  [ ]Hypercarbic   [ ]Mechanical ventilation   [ ]Trach collar   [ ]Non-invasive ventilatory support   [X ]High flow    [ ]Non-rebreather/Venti     [ ]Other organ failure     ------------------------------------------------------------------------------------------------------------    RADIOLOGY & ADDITIONAL STUDIES:   US DPLX LWR EXT VEINS COMPL BI   ORDERED BY: MORALES PARK     PROCEDURE DATE:  02/27/2024   INTERPRETATION:  CLINICAL INDICATION: asymmetry    TECHNIQUE: Grayscale, color Doppler and spectral Doppler ultrasound was   utilized to evaluate bilateral lower extremity deep venous system.    COMPARISON: 11/25/2023.    FINDINGS: There is no obvious thrombus in bilateral common femoral veins,   femoral veins or popliteal veins. Left mid/distal calf veins could not be   assessed due to overlying bandage. Visualized calf veins are patent.    IMPRESSION:    No obvious thrombosis at the visualized bilateral extremity deep veins.    --- End of Report ---    US KIDNEYS AND BLADDER   ORDERED BY: MORALES PARK     PROCEDURE DATE:  02/27/2024      INTERPRETATION:  CLINICAL INDICATION: elevated creatinine    TECHNIQUE: Ultrasound of the kidneys and bladder was performed.    COMPARISON: 9/21/2023. 1/1/2024. 2/8/2024.    FINDINGS: This study was technically difficult due to patient position.    Right kidney: 12.1 cm in length. No hydronephrosis. Increased   echogenicity, reflecting parenchymal disease. Multiple cysts measuring up   to 6.1 x 5.8 x 6.2 cm.    Left kidney: Atrophic, measuring 8.4 cm in length. No hydronephrosis.   Increased echogenicity, reflecting parenchymal disease. A 1.6 x 1.6 x 1.9   cm cyst.    Bladder: Partially distended. Usual jets visualized.  Volume: 128 ml.    IMPRESSION:    No hydronephrosis. Additional findings as described.      CXR 2/27/28    FINDINGS:    Left-sided pigtail catheter in place.  Cardiomegaly with bilateral effusions slightly worse from the study of   the day before.  Visualized upper lung fields show no focal abnormalities.  No pneumothorax.    ------------------------------------------------------------------------------------------------------------    ALLERGIES:  Allergies    No Known Allergies    Intolerances    MEDICATIONS  (STANDING):  albuterol    90 MICROgram(s) HFA Inhaler 2 Puff(s) Inhalation every 6 hours  allopurinol 100 milliGRAM(s) Oral daily  aspirin enteric coated 81 milliGRAM(s) Oral daily  atorvastatin 40 milliGRAM(s) Oral at bedtime  budesonide 160 MICROgram(s)/formoterol 4.5 MICROgram(s) Inhaler 2 Puff(s) Inhalation two times a day  dexAMETHasone  Injectable 6 milliGRAM(s) IV Push daily  heparin  Infusion. 800 Unit(s)/Hr (8 mL/Hr) IV Continuous <Continuous>  hydrocodone/homatropine Syrup 5 milliLiter(s) Oral every 6 hours  ipratropium 17 MICROgram(s) HFA Inhaler 1 Puff(s) Inhalation every 6 hours  metoprolol succinate ER 50 milliGRAM(s) Oral daily  pantoprazole    Tablet 40 milliGRAM(s) Oral before breakfast  piperacillin/tazobactam IVPB.. 3.375 Gram(s) IV Intermittent every 12 hours  polyethylene glycol 3350 17 Gram(s) Oral two times a day  senna 2 Tablet(s) Oral at bedtime  sodium chloride 0.65% Nasal 1 Spray(s) Both Nostrils two times a day  sodium chloride 0.9% for Nebulization 3 milliLiter(s) Nebulizer every 6 hours  tiotropium 2.5 MICROgram(s) Inhaler 2 Puff(s) Inhalation daily    MEDICATIONS  (PRN):  acetaminophen     Tablet .. 650 milliGRAM(s) Oral every 6 hours PRN Temp greater or equal to 38C (100.4F), Mild Pain (1 - 3)       Indication for Palliative care- f/u GOC     INTERVAL EVENTS: Patient seen this PM on HFNC. Patient awake but confused, doing hand gestures, restless.   Patient's family at bedside. See below for GOC.     ------------------------------------------------------------------------------------------------------------    PRESENT SYMPTOMS:     [X ] Unable to self-report      [X ] PAINADS     [X ] RDOS    [ ] No     [ ] Yes     Source if other than patient:  [ ]Family   [ ]Team     PAIN:   If blank, patient unable to specify     [ ]yes [x ]no    1. Location-   2. Radiation-    3. Quality-   4. Timing-   5. Minimal acceptable level/pain goal-   6. Aggravating factors-   7. QOL impact-     SYMPTOMS:   Dyspnea:                           [ ]Mild [ ]Moderate [x ]Severe  Anxiety:                             [ ]Mild [ ]Moderate [ ]Severe  Fatigue:                             [ ]Mild [ ]Moderate [ ]Severe  Nausea/Vomiting:              [ ]Mild [ ]Moderate [ ]Severe  Loss of appetite:                [ ]Mild [ ]Moderate [ ]Severe  Constipation:                     [ ]Mild [ ]Moderate [ ]Severe    Other Symptoms:  [X ]All other review of systems negative     -----------------------------------------------------------------------------------------------------------    FUNCTIONAL STATUS:     Baseline ADL (prior to admission):  [ ] Independent  [x] Moderate Assistance [ ] Dependent    Palliative Performance Score (current): 10%     [x]PPSV2 < or = to 30%   [ ]artificial nutrition      NUTRITIONAL STATUS:     Protein Calorie Malnutrition Present:   [ ]mild   [X]moderate or severe    Weight:   [ ]underweight (BMI 18.5 or less)   [ ]morbid obesity (BMI 30 or higher)   [ ]anasarca  [X ]significant weight loss     Height (cm): 167.6 (02-16-24 @ 02:40), 167.6 (02-06-24 @ 22:00), 167.6 (12-18-23 @ 09:52)  Weight (kg): 70.3 (02-16-24 @ 02:40), 70.3 (02-06-24 @ 22:00), 74.6 (01-04-24 @ 08:00)  BMI (kg/m2): 25 (02-16-24 @ 02:40), 25 (02-06-24 @ 22:00), 26.6 (01-04-24 @ 08:00)    ------------------------------------------------------------------------------------------------------------    PRIOR ADVANCE DIRECTIVES:    [ ] DNR/MOLST    [ ] Living Will    [ ] Health Care Proxy(s)    DECISION MAKER(s):  [ ] Patient    [x ] Surrogate(s) Shaq Escoto, Lora Antony  [ ] HCP   [ ] Guardian             ------------------------------------------------------------------------------------------------------------  PHYSICAL EXAM:    GENERAL:  [ ]Cachexia  [x ] Frail  [x ]Awake  [ ]Oriented x   [ ]Lethargic  [ ]Unarousable  [x ]Verbal  [ ]Non-Verbal    BEHAVIORAL:   [ ] Anxiety  [x ] Delirium [x] Agitation [ ] Other    HEENT:   [x ]Normal   [ ]Dry mouth   [ ]ET Tube/Trach  [ ]Oral lesions    PULMONARY:   [ ]Clear              [ ]Tachypnea  [ ]Audible excessive secretions   [ ]Rhonchi        [ ]Right [ ]Left [ ]Bilateral  [ ]Crackles        [ ]Right [ ]Left [ ]Bilateral  [ ]Wheezing     [ ]Right [ ]Left [ ]Bilateral  [x ]Diminished breath sounds [ ]right [x ]left [ ]bilateral L CHEST WALL TUBE DRAINING SEROSANGUINOUS FLUID    CARDIOVASCULAR:    [ ]Regular [x ]Irregular [ ]Tachy  [ ]Abdirashid [ ]Murmur [ ]Other    GASTROINTESTINAL:  [x ]Soft  [ ]Distended   [ ]+BS  [ ]Non tender [ ]Tender  [ ]Other [ ]PEG [ ]OGT/ NGT      GENITOURINARY:  [ ]Normal [x ] Incontinent   [ ]Oliguria/Anuria   [ ]Aguiar    MUSCULOSKELETAL:   [ ]Normal   [x ]Weakness  [ ]Bed/Wheelchair bound [ ]Edema left leg edema > right    NEUROLOGIC:   [X]No focal deficits  [ ]Cognitive impairment  [ ]Dysphagia [ ]Dysarthria [ ]Paresis [ ]Other     SKIN:   [X ]Normal  [ ]Rash  [ ]Other RN NOTE ON SKIN REVIEWED IN CHART  [ ]Pressure ulcer(s)       Present on admission [ ]y [ ]n    ------------------------------------------------------------------------------------------------------------    LABS:  Complete Blood Count + Automated Diff in AM (02.28.24 @ 02:45)   IANC: 15.28: IANC (instrument absolute neutrophil count) is based on the instrument   calculation which may differ from ANC (manual absolute neutrophil count)   since it is based on the calculation from a manual differential. K/uL  Nucleated RBC #: 0.02 K/uL  WBC Count: 18.53 K/uL  RBC Count: 3.19 M/uL  Hemoglobin: 9.8 g/dL  Hematocrit: 31.9 %  Mean Cell Volume: 100.0 fL  Mean Cell Hemoglobin: 30.7 pg  Mean Cell Hemoglobin Conc: 30.7 gm/dL  Red Cell Distrib Width: 15.7 %  Platelet Count - Automated: 218 K/uL  Auto Neutrophil #: 15.28 K/uL  Auto Lymphocyte #: 1.13 K/uL  Auto Monocyte #: 1.64 K/uL  Auto Eosinophil #: 0.00 K/uL  Auto Basophil #: 0.05 K/uL  Auto Neutrophil %: 82.4: Differential percentages must be correlated with absolute numbers for   clinical significance. %  Auto Lymphocyte %: 6.1 %  Auto Monocyte %: 8.9 %  Auto Eosinophil %: 0.0 %  Auto Basophil %: 0.3 %  Auto Immature Granulocyte %: 2.3    CMP 2/28/24  creatinine secretion. mL/min/1.73m2  Sodium: 148 mmol/L  Potassium: 4.5 mmol/L  Chloride: 103 mmol/L  Carbon Dioxide: 31 mmol/L  Anion Gap: 14 mmol/L  Blood Urea Nitrogen: 117 mg/dL  Creatinine: 2.90 mg/dL  Glucose: 131 mg/dL  Calcium: 8.7 mg/dL  Magnesium: 3.30 mg/dL  Phosphorus: 5.3 mg/dL       ------------------------------------------------------------------------------------------------------------    CRITICAL CARE:  [ ]Shock Present  [ ]Septic [ ]Cardiogenic [ ]Neurologic [ ]Hypovolemic [ ] Undifferentiated/Mixed   [ ]Vasopressors [ ]Inotropes     [X ]Respiratory failure present: [ ]Acute  [ ]Chronic [ ]Hypoxic  [ ]Hypercarbic   [ ]Mechanical ventilation   [ ]Trach collar   [ ]Non-invasive ventilatory support   [X ]High flow    [ ]Non-rebreather/Venti     [ ]Other organ failure     ------------------------------------------------------------------------------------------------------------    RADIOLOGY & ADDITIONAL STUDIES:   US DPLX LWR EXT VEINS COMPL BI   ORDERED BY: MORALES PARK     PROCEDURE DATE:  02/27/2024   INTERPRETATION:  CLINICAL INDICATION: asymmetry    TECHNIQUE: Grayscale, color Doppler and spectral Doppler ultrasound was   utilized to evaluate bilateral lower extremity deep venous system.    COMPARISON: 11/25/2023.    FINDINGS: There is no obvious thrombus in bilateral common femoral veins,   femoral veins or popliteal veins. Left mid/distal calf veins could not be   assessed due to overlying bandage. Visualized calf veins are patent.    IMPRESSION:    No obvious thrombosis at the visualized bilateral extremity deep veins.    --- End of Report ---    US KIDNEYS AND BLADDER   ORDERED BY: MORALES PARK     PROCEDURE DATE:  02/27/2024      INTERPRETATION:  CLINICAL INDICATION: elevated creatinine    TECHNIQUE: Ultrasound of the kidneys and bladder was performed.    COMPARISON: 9/21/2023. 1/1/2024. 2/8/2024.    FINDINGS: This study was technically difficult due to patient position.    Right kidney: 12.1 cm in length. No hydronephrosis. Increased   echogenicity, reflecting parenchymal disease. Multiple cysts measuring up   to 6.1 x 5.8 x 6.2 cm.    Left kidney: Atrophic, measuring 8.4 cm in length. No hydronephrosis.   Increased echogenicity, reflecting parenchymal disease. A 1.6 x 1.6 x 1.9   cm cyst.    Bladder: Partially distended. Usual jets visualized.  Volume: 128 ml.    IMPRESSION:    No hydronephrosis. Additional findings as described.      CXR 2/27/28    FINDINGS:    Left-sided pigtail catheter in place.  Cardiomegaly with bilateral effusions slightly worse from the study of   the day before.  Visualized upper lung fields show no focal abnormalities.  No pneumothorax.    ------------------------------------------------------------------------------------------------------------    ALLERGIES:  Allergies    No Known Allergies    Intolerances    MEDICATIONS  (STANDING):  albuterol    90 MICROgram(s) HFA Inhaler 2 Puff(s) Inhalation every 6 hours  allopurinol 100 milliGRAM(s) Oral daily  aspirin enteric coated 81 milliGRAM(s) Oral daily  atorvastatin 40 milliGRAM(s) Oral at bedtime  budesonide 160 MICROgram(s)/formoterol 4.5 MICROgram(s) Inhaler 2 Puff(s) Inhalation two times a day  dexAMETHasone  Injectable 6 milliGRAM(s) IV Push daily  heparin  Infusion. 800 Unit(s)/Hr (8 mL/Hr) IV Continuous <Continuous>  hydrocodone/homatropine Syrup 5 milliLiter(s) Oral every 6 hours  ipratropium 17 MICROgram(s) HFA Inhaler 1 Puff(s) Inhalation every 6 hours  metoprolol succinate ER 50 milliGRAM(s) Oral daily  pantoprazole    Tablet 40 milliGRAM(s) Oral before breakfast  piperacillin/tazobactam IVPB.. 3.375 Gram(s) IV Intermittent every 12 hours  polyethylene glycol 3350 17 Gram(s) Oral two times a day  senna 2 Tablet(s) Oral at bedtime  sodium chloride 0.65% Nasal 1 Spray(s) Both Nostrils two times a day  sodium chloride 0.9% for Nebulization 3 milliLiter(s) Nebulizer every 6 hours  tiotropium 2.5 MICROgram(s) Inhaler 2 Puff(s) Inhalation daily    MEDICATIONS  (PRN):  acetaminophen     Tablet .. 650 milliGRAM(s) Oral every 6 hours PRN Temp greater or equal to 38C (100.4F), Mild Pain (1 - 3)

## 2024-02-28 NOTE — CHART NOTE - NSCHARTNOTEFT_GEN_A_CORE
RRT called for hypoxia .   See RRT summary note   team at bedside   - Patient was placed on CPAP with improvement with o2 saturation   - 1 dose of IV lasix given , will monitor urine outpt    - STAT CXR performed   - ABG   Blood Gas Profile - Arterial (02.28.24 @ 15:50)    pH, Arterial: 7.26    pCO2, Arterial: 81: TYPE:(C=Critical, N=Notification, A=Abnormal) _  TESTS: _PCO2  DATE/TIME CALLED: _02/28/2024 16:30:23 EST  CALLED TO: _ELOINA RODGERS  READ BACK (2 Patient Identifiers)(Y/N): _Y  READ BACK VALUES (Y/N): _Y  CALLED BY: _MK mmHg    pO2, Arterial: 68 mmHg    HCO3, Arterial: 36 mmol/L    Base Excess, Arterial: 6.9 mmol/L    Oxygen Saturation, Arterial: 93.4 %    Total CO2, Arterial: 39 mmol/L    FIO2, Arterial: 40    Blood Gas Source Arterial: Arterial      will Change to BIPAP given hypercarbia/ respiratory acidosis    - repeat ABG in 1 hour following BIPAP   - On continuous pulse ox    - DNR/ I   - continue all medical treatment

## 2024-02-28 NOTE — PROGRESS NOTE ADULT - PROBLEM SELECTOR PLAN 2
- May experience discomfort/pain of left chest wall  - Tylenol 650 mg q6h prn for mild pain  - Consider IV Dilaudid 0.25 mg q4h prn for moderate pain  - Consider IV dilaudid 0.5 mg q4h prn for severe pain  - Hold any opioids for hypotension, oversedation, RR<12  - Avoid nephrotoxic agents (morphine, oxy)  - Narcan prn for suspected overdose. - May experience discomfort/pain of left chest wall  - Consider IV Dilaudid 0.25 mg q4h prn for moderate pain  - Consider IV dilaudid 0.5 mg q4h prn for severe pain

## 2024-02-28 NOTE — CHART NOTE - NSCHARTNOTEFT_GEN_A_CORE
[facility] NIGHT MEDICINE COVERAGE    PM VBG reviewed:  VBG (02-28-24 @ 22:42):  pH: 7.19  | pCO2: 96    | pO2: 109   | Lactate: 1.0      Pt on BiPAP, breathing w/o evidence of respiratory distress, A&Ox0, lethargic on exam.    Vital Signs Last 24 Hrs  T(C): 37.2 (28 Feb 2024 22:14), Max: 37.2 (28 Feb 2024 22:14)  T(F): 99 (28 Feb 2024 22:14), Max: 99 (28 Feb 2024 22:14)  HR: 72 (28 Feb 2024 22:14) (72 - 104)  BP: 126/71 (28 Feb 2024 22:14) (126/71 - 154/77)  RR: 20 (28 Feb 2024 22:14) (20 - 23)  SpO2: 94% (28 Feb 2024 22:38) (94% - 100%)  O2 Parameters below as of 28 Feb 2024 22:14  Patient On (Oxygen Delivery Method): BiPAP/CPAP    Plan:  -D/w overnight HIC, Dr. Feliciano, recommended changing patient to AVAPS given worsening uncompensated respiratory acidosis, AVAPS ordered, d/w respiratory therapist on unit.  -Will repeat ABG in 2 hours to reassess.  -Pt DNR/DNI, trial of non-invasive ventilation per [facility] notes and MOLST in chart.    D/w RN.  Will continue to monitor.    JACKIE BoneC  Department of Hospital Medicine - Night [facility]  In-House Pager: #[phone] [facility] NIGHT MEDICINE COVERAGE    PM VBG reviewed:  VBG (02-28-24 @ 22:42):  pH: 7.19  | pCO2: 96    | pO2: 109   | Lactate: 1.0      Pt on BiPAP, breathing w/o evidence of respiratory distress, A&Ox0, lethargic on exam.    Vital Signs Last 24 Hrs  T(C): 37.2 (28 Feb 2024 22:14), Max: 37.2 (28 Feb 2024 22:14)  T(F): 99 (28 Feb 2024 22:14), Max: 99 (28 Feb 2024 22:14)  HR: 72 (28 Feb 2024 22:14) (72 - 104)  BP: 126/71 (28 Feb 2024 22:14) (126/71 - 154/77)  RR: 20 (28 Feb 2024 22:14) (20 - 23)  SpO2: 94% (28 Feb 2024 22:38) (94% - 100%)  O2 Parameters below as of 28 Feb 2024 22:14  Patient On (Oxygen Delivery Method): BiPAP/CPAP    Plan:  -D/w overnight HIC, Dr. Feliciano, recommended changing patient to AVAPS given worsening uncompensated respiratory acidosis, AVAPS ordered, d/w respiratory therapist on unit.  -Will repeat ABG in 2 hours to reassess.  -Pt DNR/DNI, trial of non-invasive ventilation per [facility] notes and MOLST in chart.    D/w RN.  Will continue to monitor.    ADDENDUM on 2/29/2024:  ABG results noted, worsening hypercarbia and respiratory alkalosis - pH = 7.19, pCO2 = 100.  Switched pt back to BIPAP, setting changed to 15/8.  Will repeat blood gas to reassess response.    Spoke to pt's daughters, Frida (HCP) and Lora, via telephone at 1:30 AM.  Explained that the pt's hypercarbia is worsening.  GOC remains DNR/DNI, continue non-invasive ventilation, and other medical treatments at this time.  Writer also expressed that the pt's prognosis is guarded given the worsening hypercarbia.  They expressed understanding, all questions answered.    Grant Harp PA-C  Department of Hospital Medicine - Night [facility]  In-House Pager: #[phone] [facility] NIGHT MEDICINE COVERAGE    PM VBG reviewed:  VBG (02-28-24 @ 22:42):  pH: 7.19  | pCO2: 96    | pO2: 109   | Lactate: 1.0      Pt on BiPAP, breathing w/o evidence of respiratory distress, A&Ox0, lethargic on exam.    Vital Signs Last 24 Hrs  T(C): 37.2 (28 Feb 2024 22:14), Max: 37.2 (28 Feb 2024 22:14)  T(F): 99 (28 Feb 2024 22:14), Max: 99 (28 Feb 2024 22:14)  HR: 72 (28 Feb 2024 22:14) (72 - 104)  BP: 126/71 (28 Feb 2024 22:14) (126/71 - 154/77)  RR: 20 (28 Feb 2024 22:14) (20 - 23)  SpO2: 94% (28 Feb 2024 22:38) (94% - 100%)  O2 Parameters below as of 28 Feb 2024 22:14  Patient On (Oxygen Delivery Method): BiPAP/CPAP    Plan:  -D/w overnight HIC, Dr. Feliciano, recommended changing patient to AVAPS given worsening uncompensated respiratory acidosis, AVAPS ordered, d/w respiratory therapist on unit.  -Will repeat ABG in 2 hours to reassess.  -Pt DNR/DNI, trial of non-invasive ventilation per [facility] notes and MOLST in chart.    D/w RN.  Will continue to monitor.    ADDENDUM on 2/29/2024:  ABG results noted, worsening hypercarbia and respiratory alkalosis - pH = 7.19, pCO2 = 100.  Switched pt back to BIPAP, setting changed to 16/8.  Will repeat blood gas to reassess response.    Spoke to pt's daughters, Frida (HCP) and Lora, via telephone at 1:30 AM.  Explained that the pt's hypercarbia is worsening.  GOC remains DNR/DNI, continue non-invasive ventilation, and other medical treatments at this time.  Writer also expressed that the pt's prognosis is guarded given the worsening hypercarbia.  They expressed understanding, all questions answered.    Grant Harp PA-C  Department of Hospital Medicine - Night [facility]  In-House Pager: #[phone] [facility] NIGHT MEDICINE COVERAGE    PM VBG reviewed:  VBG (02-28-24 @ 22:42):  pH: 7.19  | pCO2: 96    | pO2: 109   | Lactate: 1.0      Pt on BiPAP, breathing w/o evidence of respiratory distress, A&Ox0, lethargic on exam.    Vital Signs Last 24 Hrs  T(C): 37.2 (28 Feb 2024 22:14), Max: 37.2 (28 Feb 2024 22:14)  T(F): 99 (28 Feb 2024 22:14), Max: 99 (28 Feb 2024 22:14)  HR: 72 (28 Feb 2024 22:14) (72 - 104)  BP: 126/71 (28 Feb 2024 22:14) (126/71 - 154/77)  RR: 20 (28 Feb 2024 22:14) (20 - 23)  SpO2: 94% (28 Feb 2024 22:38) (94% - 100%)  O2 Parameters below as of 28 Feb 2024 22:14  Patient On (Oxygen Delivery Method): BiPAP/CPAP    Plan:  -D/w overnight HIC, Dr. Feliciano, recommended changing patient to AVAPS given worsening uncompensated respiratory acidosis, AVAPS ordered, d/w respiratory therapist on unit.  -Will repeat ABG in 2 hours to reassess.  -Pt DNR/DNI, trial of non-invasive ventilation per [facility] notes and MOLST in chart.    D/w RN.  Will continue to monitor.    ADDENDUM on 2/29/2024:  ABG results noted, worsening hypercarbia and respiratory alkalosis - pH = 7.19, pCO2 = 100.  Switched pt back to BiPAP, setting changed to 16/8.  Will repeat blood gas to reassess response.    Spoke to pt's daughters, Frida (HCP) and Lora, via telephone at 1:30 AM.  Explained that the pt's hypercarbia is worsening.  GOC remains DNR/DNI, continue non-invasive ventilation, and other medical treatments at this time.  Writer also expressed that the pt's prognosis is guarded given the worsening hypercarbia.  They expressed understanding, all questions answered.    ADDENDUM at 4:11 AM on 2/29/2024:  Repeat ABG results noted, pH = 7.21, pCO2 90 on BiPAP on 16/8.  Will continue w/ current BiPAP settings, reassess blood gas as needed.    Notified by RN that pt's L Pigtail Chest Tube dressing was saturated with blood, advised RN to stop heparin gtt.  Pt assessed at bedside, blood noted on dressing and chux under pt.  Dressing removed, no active bleeding noted around L PTC, suture intact.  VSS.  Will stop heparin gtt for 3 hours, dressing to be changed and reassessed as needed.  aPTT and CBC results noted - aPTT was above goal at 74.6, Hgb showing stable trend.  Will d/w day provider in AM, day team to follow-up on heparin gtt resumption.  D/w RN.    Grant Harp PA-C  Department of Hospital Medicine - Night [facility]  In-House Pager: #[phone] [facility] NIGHT MEDICINE COVERAGE    PM VBG reviewed:  VBG (02-28-24 @ 22:42):  pH: 7.19  | pCO2: 96    | pO2: 109   | Lactate: 1.0      Pt on BiPAP, breathing w/o evidence of respiratory distress, A&Ox0, lethargic on exam.    Vital Signs Last 24 Hrs  T(C): 37.2 (28 Feb 2024 22:14), Max: 37.2 (28 Feb 2024 22:14)  T(F): 99 (28 Feb 2024 22:14), Max: 99 (28 Feb 2024 22:14)  HR: 72 (28 Feb 2024 22:14) (72 - 104)  BP: 126/71 (28 Feb 2024 22:14) (126/71 - 154/77)  RR: 20 (28 Feb 2024 22:14) (20 - 23)  SpO2: 94% (28 Feb 2024 22:38) (94% - 100%)  O2 Parameters below as of 28 Feb 2024 22:14  Patient On (Oxygen Delivery Method): BiPAP/CPAP    Plan:  -D/w overnight HIC, Dr. Feliciano, recommended changing patient to AVAPS given worsening uncompensated respiratory acidosis, AVAPS ordered, d/w respiratory therapist on unit.  -Will repeat ABG in 2 hours to reassess.  -Pt DNR/DNI, trial of non-invasive ventilation per [facility] notes and MOLST in chart.    D/w RN.  Will continue to monitor.    ADDENDUM on 2/29/2024:  ABG results noted, worsening hypercarbia and respiratory alkalosis - pH = 7.19, pCO2 = 100.  Switched pt back to BiPAP, setting changed to 16/8.  Will repeat blood gas to reassess response.    Spoke to pt's daughters, Frida (HCP) and Lora, via telephone at 1:30 AM.  Explained that the pt's hypercarbia is worsening.  GOC remains DNR/DNI, continue non-invasive ventilation, and other medical treatments at this time.  Writer also expressed that the pt's prognosis is guarded given the worsening hypercarbia.  They expressed understanding, all questions answered.    ADDENDUM at 4:11 AM on 2/29/2024:  Repeat ABG results noted, pH = 7.21, pCO2 = 90, HCO3 = 36 on BiPAP on 16/8.  Will continue w/ current BiPAP settings, reducing FiO2 to 50% as hyperoxygenation noted on blood gas, reassess blood gas as needed.    Notified by RN that pt's L Pigtail Chest Tube dressing was saturated with blood, advised RN to stop heparin gtt.  Pt assessed at bedside, blood noted on dressing and chux under pt.  Dressing removed, no active bleeding noted around L PTC, suture intact.  VSS.  Will stop heparin gtt for 3 hours, dressing to be changed and reassessed as needed.  aPTT and CBC results noted - aPTT was above goal at 74.6, Hgb showing stable trend.  Worsening renal function noted, K = 5.5.  Insulin 5 units + D50 amp x1 ordered.    Will d/w day provider in AM, day team to follow-up on heparin gtt resumption.  D/w RN.    Grant Harp PA-C  Department of Hospital Medicine - Night [facility]  In-House Pager: #[phone]

## 2024-02-28 NOTE — PROGRESS NOTE ADULT - SUBJECTIVE AND OBJECTIVE BOX
[facility] DIVISION OF KIDNEY DISEASE AND HYPERTENSION  [phone]    RENAL FOLLOW UP NOTE  --------------------------------------------------------------------------------  Patient seen and examined earlier.  Being fed by RN at time of exam    PAST HISTORY  --------------------------------------------------------------------------------  No significant changes to PMH, PSH, FHx, SHx, unless otherwise noted    ALLERGIES & MEDICATIONS  --------------------------------------------------------------------------------  Allergies    No Known Allergies    Intolerances      Standing Inpatient Medications  albuterol    90 MICROgram(s) HFA Inhaler 2 Puff(s) Inhalation every 6 hours  allopurinol 100 milliGRAM(s) Oral daily  aspirin enteric coated 81 milliGRAM(s) Oral daily  atorvastatin 40 milliGRAM(s) Oral at bedtime  budesonide 160 MICROgram(s)/formoterol 4.5 MICROgram(s) Inhaler 2 Puff(s) Inhalation two times a day  dexAMETHasone  Injectable 6 milliGRAM(s) IV Push daily  heparin  Infusion 800 Unit(s)/Hr IV Continuous <Continuous>  hydrocodone/homatropine Syrup 5 milliLiter(s) Oral every 6 hours  metoprolol succinate ER 50 milliGRAM(s) Oral daily  pantoprazole    Tablet 40 milliGRAM(s) Oral before breakfast  piperacillin/tazobactam IVPB.. 3.375 Gram(s) IV Intermittent every 12 hours  polyethylene glycol 3350 17 Gram(s) Oral two times a day  senna 2 Tablet(s) Oral at bedtime  sodium chloride 0.65% Nasal 1 Spray(s) Both Nostrils two times a day  sodium chloride 0.9% for Nebulization 3 milliLiter(s) Nebulizer every 6 hours  sodium chloride 0.9%. 1000 milliLiter(s) IV Continuous <Continuous>  tiotropium 2.5 MICROgram(s) Inhaler 2 Puff(s) Inhalation daily    PRN Inpatient Medications  acetaminophen     Tablet .. 650 milliGRAM(s) Oral every 6 hours PRN      FOCUSED REVIEW OF SYSTEMS  --------------------------------------------------------------------------------  patient non-communicative at time of exam      VITALS/PHYSICAL EXAM  --------------------------------------------------------------------------------  T(C): 36.3 (02-28-24 @ 05:00), Max: 36.7 (02-27-24 @ 12:30)  HR: 93 (02-28-24 @ 05:00) (80 - 93)  BP: 137/81 (02-28-24 @ 05:00) (133/61 - 148/86)  RR: 21 (02-28-24 @ 05:00) (20 - 23)  SpO2: 98% (02-28-24 @ 05:00) (94% - 98%)  Wt(kg): --        02-27-24 @ 07:01  -  02-28-24 @ 07:00  --------------------------------------------------------  IN: 250 mL / OUT: 160 mL / NET: 90 mL      Physical Exam:  	Gen: NAD, frail-appearing              Resp: b/l rhonchi, on high flow NC; L CT  	CV: irregular, S1S2  	Abd: +BS, soft, nontender/nondistended  	: No suprapubic tenderness.  no lewis          Extremity: No cyanosis, no edema  	Neuro: Awake, interactive with RN and following commands      LABS/STUDIES  --------------------------------------------------------------------------------              9.8    18.53 >-----------<  218      [02-28-24 @ 02:45]              31.9     148  |  103  |  117  ----------------------------<  131      [02-28-24 @ 02:45]  4.5   |  31  |  2.90        Ca     8.7     [02-28-24 @ 02:45]      Mg     3.30     [02-28-24 @ 02:45]      Phos  5.3     [02-28-24 @ 02:45]        PTT: 78.0       [02-28-24 @ 09:40]        Creatinine Trend:  SCr 2.90 [02-28 @ 02:45]  SCr 2.72 [02-27 @ 04:38]  SCr 2.68 [02-26 @ 15:00]  SCr 2.60 [02-26 @ 03:49]  SCr 2.45 [02-25 @ 05:45]              Urinalysis - [02-28-24 @ 02:45]      Color  / Appearance  / SG  / pH       Gluc 131 / Ketone   / Bili  / Urobili        Blood  / Protein  / Leuk Est  / Nitrite       RBC  / WBC  / Hyaline  / Gran  / Sq Epi  / Non Sq Epi  / Bacteria       Iron 58, TIBC 275, %sat 21      [01-02-24 @ 07:15]  Ferritin 298      [01-02-24 @ 07:15]  HbA1c 5.3      [06-01-19 @ 05:00]  TSH 1.193      [11-24-23 @ 06:55]  Lipid: chol 103, TG 61, HDL 48, LDL --      [02-07-24 @ 06:49]

## 2024-02-28 NOTE — PROGRESS NOTE ADULT - ASSESSMENT
Pt is a 89 yo M with PMH CVA, a-fib (eliquis), CHF (EF 45-50%), HTN, HLD, COPD, and CKD3 p/w SOB. Found to have L pleural effusion and COVID s/p remdesivir. Course c/b worsening hypoxia, started on zosyn and steroids, now on HFNC with pulm and CT Sx following s/p PTC 2/23 -- likely no plan for further intervention. On heparin for a-fib with intermittent bleeding (chest tube site, skin lac) briefly held, now with pt specific PTT 40-60 goal. GOC meeting held with pt's daughters 2/28, palliative following, now DNR/DNI. Also with worsening Cr, for which renal is following and pt pending LE dopplers and renal US. RRT called 2/28 for hypoxia, found to have CO2 retention, now on BIPAP.

## 2024-02-28 NOTE — CHART NOTE - NSCHARTNOTEFT_GEN_A_CORE
[facility] NIGHT MEDICINE COVERAGE    Notified by RN that pt had pain at L chest tube site.  Pt seen at bedside, nodded 'yes' to pain around chest tube site.  Pt satting well on HFNC.  Lungs CTA b/l w/o wheezes, rales, or rhonchi.  L chest tube dressing is clean, dry, and intact.  Small amount of serosanguinous fluid in chest tube drainage.  Ofirmev 1g IVPB x1 and Lidocaine patch ordered for pain control.  Pt pending daily CXR in AM.  D/w RN.  Will continue to monitor.    Vital Signs Last 24 Hrs  T(C): 36.5 (27 Feb 2024 22:25), Max: 36.7 (27 Feb 2024 12:30)  T(F): 97.7 (27 Feb 2024 22:25), Max: 98.1 (27 Feb 2024 12:30)  HR: 83 (28 Feb 2024 04:36) (77 - 90)  BP: 134/72 (27 Feb 2024 22:25) (134/72 - 148/86)  RR: 22 (28 Feb 2024 04:36) (20 - 23)  SpO2: 98% (28 Feb 2024 04:36) (94% - 100%)  O2 Parameters below as of 28 Feb 2024 04:36  Patient On (Oxygen Delivery Method): nasal cannula, high flow  O2 Flow (L/min): 40  O2 Concentration (%): 40    Grant Harp PA-C  Department of Hospital Medicine - Night [facility]  In-House Pager: #[phone]

## 2024-02-29 NOTE — PROGRESS NOTE ADULT - PROBLEM SELECTOR PLAN 3
- Will continue to follow for ongoing changes in GOC, anticipate future transition to comfort care For GOC- Family aware patient is dying, DNR/DNI/comfort care. They want to continue bipap, waiting for patient's granddaughter flies in from [place] tonight (arriving around  PM tentatively).

## 2024-02-29 NOTE — PROVIDER CONTACT NOTE (OTHER) - BACKGROUND
Pt admitted for pleurX catheter placement
admitted for pleurx cath insertion
Heart failure, A-fib, hypertension, congestive heart failure
Pt admitted for pleurX catheter placement
admitted for pleurx cath insertion
CVA, heart failure, A-fib
Heart failure, A-fib, hypertension, congestive heart failure
admitted for pleurx cath insertion
CVA, heart failure, A-fib
Heart failure, A-fib, hypertension, congestive heart failure
Pt admitted for pleurX catheter
Pt admitted for pleurX catheter placement
admitted for pleurx cath insertion
89 y/o M CHF w pleural effusions, PMHx COPD, A fib, CVA
admitted for pleurx cath insertion

## 2024-02-29 NOTE — PROGRESS NOTE ADULT - SUBJECTIVE AND OBJECTIVE BOX
Cardiovascular Disease Progress Note  Date of Service: 24 @ 09:25    Hypoxia noted.  The patient is lethargic on BiPaP.     Objective Findings:  T(C): 37 (24 @ 05:05), Max: 37.2 (24 @ 22:14)  HR: 99 (24 @ 05:05) (72 - 105)  BP: 105/56 (24 @ 05:05) (105/56 - 154/77)  RR: 20 (24 @ 05:05) (20 - 22)  SpO2: 96% (24 @ 05:05) (94% - 100%)  Wt(kg): --  Daily     Daily Weight in k.5 (2024 05:05)      Physical Exam:  Gen: NAD; Patient resting comfortably  HEENT:  Normocephalic/ atraumatic  CV: IIR, normal S1 + S2, no m/r/g  Lungs:  Normal respiratory effort; decreased air entry to auscultation bilaterally  Abd: soft, non-tender; bowel sounds present  Ext:   warm and well perfused    Telemetry: a-fib 100s    Laboratory Data:                        10.3   19.87 )-----------( 230      ( 2024 03:24 )             34.0         149<H>  |  103  |  132<H>  ----------------------------<  128<H>  5.5<H>   |  31  |  3.65<H>    Ca    8.8      2024 03:24  Phos  8.4       Mg     3.50           PTT - ( 2024 03:24 )  PTT:74.6 sec          Inpatient Medications:  MEDICATIONS  (STANDING):  albuterol    90 MICROgram(s) HFA Inhaler 2 Puff(s) Inhalation every 6 hours  allopurinol 100 milliGRAM(s) Oral daily  aspirin enteric coated 81 milliGRAM(s) Oral daily  atorvastatin 40 milliGRAM(s) Oral at bedtime  budesonide 160 MICROgram(s)/formoterol 4.5 MICROgram(s) Inhaler 2 Puff(s) Inhalation two times a day  dexAMETHasone  Injectable 6 milliGRAM(s) IV Push daily  heparin  Infusion 800 Unit(s)/Hr (7 mL/Hr) IV Continuous <Continuous>  metoprolol succinate ER 50 milliGRAM(s) Oral daily  pantoprazole    Tablet 40 milliGRAM(s) Oral before breakfast  piperacillin/tazobactam IVPB.. 3.375 Gram(s) IV Intermittent every 12 hours  polyethylene glycol 3350 17 Gram(s) Oral two times a day  senna 2 Tablet(s) Oral at bedtime  sodium chloride 0.65% Nasal 1 Spray(s) Both Nostrils two times a day  sodium chloride 0.9% for Nebulization 3 milliLiter(s) Nebulizer every 6 hours  tiotropium 2.5 MICROgram(s) Inhaler 2 Puff(s) Inhalation daily      Assessment: 89 year old man with a-fib, HTN, prior CVA, and mitral stenosis presents with acute diastolic CHF and pleural effusion complicated by COVID-19.     Plan of Care:    #Acute hypoxic respiratory failure-  Large pleural effusions with mucous plugging s/p chest tube.   Progressive hypoxic respiratory failure now on BiPaP  Pulm input reviewed.   Diuretics held for acute on chronic renal disease, as per the renal team.   Poor overall prognosis.       #A-fib-  Associated with moderate mitral stenosis.  Please change over to IV Lopressor if the patient is too lethargic to tolerate PO meds.   No objection to holding AC given bleeding at chest tube site.     #COVID-19-  Management as per the pulmonary team.            Over 55 minutes of critical care time spent on total encounter; more than 50% of the visit was spent counseling and/or coordinating care by the attending physician.      Rey Crowley MD [facility]  Cardiovascular Disease  [phone]

## 2024-02-29 NOTE — PROGRESS NOTE ADULT - CONVERSATION/DISCUSSION
Diagnosis/Prognosis/MOLST Discussed/Treatment Options
Diagnosis/Prognosis/MOLST Discussed
Prognosis/MOLST Discussed
Diagnosis/Prognosis/MOLST Discussed
Prognosis/MOLST Discussed

## 2024-02-29 NOTE — PROGRESS NOTE ADULT - PROBLEM SELECTOR PLAN 1
- Patient admitted for acute hypoxic respiratory failure 2/2 recurrence of large left pleural effusion   - CT chest 2/8 with lingular and left lower lobe groundglass infiltrates likely pneumonia. Small bilateral pleural effusions. Cardiomegaly. Stable prominent mediastinal lymph nodes  - CXR 2/16 demonstrating increase in the left pleural effusion now moderate to large with associated consolidation  - Thoracic surgery consulted - s/p bedside PTC placement 2/24; likely no further intervention planned   -was on CTX since 2/20- new RML/RLL opacity on x-ray 2/23- RRT 2/24 with hypoxemia- escalated to IV Zosyn  -s/p 5days of Remdesevir for covid+  -started dexamethasone for covid 2/24 -- dc for comfort measures   -c/w BIPAP until pt granddaughter arrives 2/29 PM; then can transition to NC for comfort   -comfort measures only  -glycopyrrolate for secretions  -dilaudid for air hunger

## 2024-02-29 NOTE — PROGRESS NOTE ADULT - ASSESSMENT
Patient is a 89M PMHx A. fib previously on Eliquis, CHF (EF 45-50%, combined systolic and diastolic), CVA, HTN, COPD, CKD3, multiple hospitalizations for CHF exacerbation in the prior months, left sided pleural effusion that was drained last hospitalization, admitted to [facility] on 2/6/2024 for CHF exacerbation and acute on chronic hypoxic respiratory failure 2/2 large left pleural effusion. TTE on 2/7 showed EF 55-60%, pulmonary hypertension, moderate mitral stenosis. He was evaluated by Pulmonary and had Thoracentesis on 2/8, 1 L bloody output drained.  Pulmonology recommended transfer to [facility] for Pleurex catheter and possible EUS for lymph node biopsy.  Patient placed on IV lasix for diuresis.  Pt was also found with 2.5 x 1.4 cm right retroareolar breast nodule larger than 9/19/2023.   Surgery was consulted and recommended outpatient follow-up. Patient also found with LLE blister, which is chronic.  Pt also with IRINEO 2/2 CKD3 on this admission, with Hypernatremia and Hypokalemia, K was repleted. Patient transitioned from Eliquis to heparin gtt during admission due to valvular heart disease. Patient was also evaluated by cardiology for moderate mitral stenosis, CHF, and a. fib, was diuresed with IV Lasix, treated with metoprolol, and recommended for structural heart evaluation knowing that patient is a poor candidate for procedural intervention due to comorbidities. Patient was then transferred to [facility]. Of note, patient was recently admitted in 1/2024 presenting with concern for LE blisters, found to have acute on chronic CHF, large exudative pleural effusion s/p 1L removal, transferred from  for thoracic surgery evaluation and was found to be a poor surgical candidate. IR was consulted and recommended that patient had no indication for a pigtail catheter placement due to resolution of dyspnea. Patient was then discharged. On interview, patient denies chest pain, abdominal pain, reports difficulty urinating, and reports breathing is manageable. Patient endorses productive cough. Patient reports being willing to undergo thoracentesis or PleurX catheter.

## 2024-02-29 NOTE — PROGRESS NOTE ADULT - PROBLEM SELECTOR PLAN 2
worsening infiltrates on CXR despite Abx, Remdesevir  -CTX 2/20-, changed to Zosyn 2/24  -Sputum cx: normal blaine  -urine legionella negative  -s/p Remdesevir   -comfort measures as above

## 2024-02-29 NOTE — PROGRESS NOTE ADULT - PROBLEM SELECTOR PLAN 1
likely 2/2 overdiuresis in setting of decreased oral intake and infection, now with bleeding from CT and requiring FiO2 of 100%.  Patient DNR/DNI with guarded prognosis.  Family at bedside and had repeat GOC discussion this morning,.  not a candidate for renal replacement therapy in light of frailty and comorbid status  -bladder not overtly distended, but can consider bladder scan to evaluate for retention  -d/w medicine attending, Dr. Downing.  PLan for initation of Dilaudid for comfort.  -no plan for further labs    Nephrology to sign off.  Please recall Dr. Hansen via Teams if further input needed.  For questions Weekdays after 4PM and on weekends, [place] Renal [place] [phone]

## 2024-02-29 NOTE — PROGRESS NOTE ADULT - ATTENDING COMMENTS
Patient seen and examined at bedside. Agree with above assessment and plan    Patient with worsening acute hypoxemic respiratory failure on HFNC, s/p L sided chest tube, with persistent serosanguinous fluid drainage. Overall prognosis poor. Mental status worse today. Family agreed to DNR/DNI.

## 2024-02-29 NOTE — PROGRESS NOTE ADULT - ASSESSMENT
Pt is a 89 yo M with PMH CVA, a-fib (eliquis), CHF (EF 45-50%), HTN, HLD, COPD, and CKD3 p/w SOB. Found to have L pleural effusion and COVID s/p remdesivir. Course c/b worsening hypoxia, started on zosyn and steroids, now on HFNC with pulm and CT Sx following s/p PTC 2/23 -- likely no plan for further intervention. On heparin for a-fib with intermittent bleeding (chest tube site, skin lac) briefly held, now with pt specific PTT 40-60 goal. GOC meeting held with pt's daughters 2/28, palliative following, now DNR/DNI and comfort measures due to worsening hypoxia/hypercarbia and need for BIPAP. Pending removal of BIPAP 2/29 evening once family has arrived. Started on dilaudid/glycopyrrolate.

## 2024-02-29 NOTE — PROGRESS NOTE ADULT - SUBJECTIVE AND OBJECTIVE BOX
PULMONARY PROGRESS NOTE    PATIENT INFORMATION:  NAME: RAD SINGLETON:  MRN: [id number]    CHIEF COMPLAINT: Patient is a 89y old  Male who presents with a chief complaint of Transfer for PleurX (29 Feb 2024 11:10)      [x] INITIAL CONSULT, H&P, FAMILY HISTORY and PAST MEDICAL AND SURGICAL HISTORY REVIEWED    OVERNIGHT EVENTS, CHANGES TO HPI, SUBJECTIVE:   - Patient seen and examined at bedside  - No overnight events  - Symptoms stable/improving    ========================REVIEW OF SYSTEMS========================  [x] ROS negative except as per HPI    ========================MEDICATIONS=============================  NEURO    CARDIO    PULM    FEN/GI    HEME/ONC    ANTIMICROBIALS  piperacillin/tazobactam IVPB.. 3.375 Gram(s) IV Intermittent every 12 hours    ENDO    OTHER      PRN      ========================PHYSICAL EXAM============================    VITALS: Vital Signs Last 24 Hrs  T(C): 37 (29 Feb 2024 05:05), Max: 37.2 (28 Feb 2024 22:14)  T(F): 98.6 (29 Feb 2024 05:05), Max: 99 (28 Feb 2024 22:14)  HR: 111 (29 Feb 2024 08:44) (72 - 111)  BP: 105/56 (29 Feb 2024 05:05) (105/56 - 150/89)  BP(mean): --  RR: 20 (29 Feb 2024 05:05) (20 - 20)  SpO2: 99% (29 Feb 2024 08:44) (94% - 100%)    Parameters below as of 29 Feb 2024 05:05  Patient On (Oxygen Delivery Method): BiPAP/CPAP        INTAKE and OUTPUT: I&O's Detail    28 Feb 2024 07:01  -  29 Feb 2024 07:00  --------------------------------------------------------  IN:  Total IN: 0 mL    OUT:    Chest Tube (mL): 140 mL  Total OUT: 140 mL    Total NET: -140 mL          VENTILATOR SETTINGS: Mode: NIV (Noninvasive Ventilation)  RR (machine): 20  TV (machine): 450  FiO2: 80  PEEP: 8  ITime: 1  P-High: 30  P-Low: 15  PIP: 16      Physical Exam  CONST:     ill appearing; obtunded; not in distress  ENMT:       bipap mask in place  RESP :       course breath sounds  CHEST:      chest tube in place - unkinked by pulm team  CARDS:     RRR  VASC:        no edema     ABD:          soft nontender  NEURO:    obtunded         ======================LABORATORY RESULTS AND IMAGING=============                        10.3   19.87 )-----------( 230      ( 29 Feb 2024 03:24 )             34.0                                  02-29    149<H>  |  103  |  132<H>  ----------------------------<  128<H>  5.5<H>   |  31  |  3.65<H>    Ca    8.8      29 Feb 2024 03:24  Phos  8.4     02-29  Mg     3.50     02-29      N:15.28/L:1.13= __ 02-28-24 @ 02:45  N:14.46/L:0.96= __ 02-27-24 @ 04:38  N:14.17/L:0.95= __ 02-26-24 @ 03:49  N:9.00/L:0.96= __ 02-22-24 @ 06:46  N:5.54/L:1.23= __ 02-21-24 @ 05:01    Ref-range: <3 normal, >9 elevated, >18 very elevated        ABG Trend  02-29-24 @ 03:24 [id number]  - 7.21/90/144/36/98.7 Lactate --  02-29-24 @ 00:19 [id number]  - 7.19/100/138/38/99.0 Lactate --  02-28-24 @ 15:50 [id number]  - 7.26/81/68/36/93.4 Lactate --  02-24-24 @ 15:55 [id number]  - 7.39/57/89/34/98.0 Lactate --  10-16-23 @ 05:30 [id number]  - 7.46/44/72/31/95.4 Lactate --    VBG Trend  02-28-24 @ 20:30 FiO2--  - 7.19/96/109/37/98.0 Lactate 1.0  02-26-24 @ 01:15 FiO2--  - 7.38/61/42/36/68.4 Lactate --  02-24-24 @ 09:24 FiO2--  - 7.31/72/45/36/73.5 Lactate 2.1  12-17-22 @ 07:25 FiO2--  - 7.32/51/66/26/92.0 Lactate --      Creatinine Trend: 3.65<--, 2.90<--, 2.72<--, 2.68<--, 2.60<--, 2.45<--    [x] RADIOLOGY REVIEWED AND INTERPRETED BY ME

## 2024-02-29 NOTE — PROGRESS NOTE ADULT - PROBLEM SELECTOR PLAN 4
- s/p multiple thoracenteses with most recent thoracentesis resulting in removal of 1L of serosanguinous fluid   - Cytopathology negative for malignant cells; pleural fluid from 2/8 exudative by Light's criteria; pleural fluid Cx unremarkable   - Thoracic Surgery consulted, f/u recs --> no plan for further intervention   -s/p bedside PTC placement 2/24  -c/b bleeding/dressing saturation and PTC site --> dc heparin gtt for comfort measures

## 2024-02-29 NOTE — PROGRESS NOTE ADULT - SUBJECTIVE AND OBJECTIVE BOX
[facility] DIVISION OF KIDNEY DISEASE AND HYPERTENSION  [phone]    RENAL FOLLOW UP NOTE  --------------------------------------------------------------------------------  Patient seen and examined.  Overnight events reviewed re: bleeding from CT, RRT for hypoxia.  Patient on 100% FIO2.  Family at bedside and outside of room.  As per primary team attending, dilaudid for comfort to be started    PAST HISTORY  --------------------------------------------------------------------------------  No significant changes to PMH, PSH, FHx, SHx, unless otherwise noted    ALLERGIES & MEDICATIONS  --------------------------------------------------------------------------------  Allergies    No Known Allergies    Intolerances      Standing Inpatient Medications  albuterol    90 MICROgram(s) HFA Inhaler 2 Puff(s) Inhalation every 6 hours  allopurinol 100 milliGRAM(s) Oral daily  aspirin enteric coated 81 milliGRAM(s) Oral daily  atorvastatin 40 milliGRAM(s) Oral at bedtime  budesonide 160 MICROgram(s)/formoterol 4.5 MICROgram(s) Inhaler 2 Puff(s) Inhalation two times a day  dexAMETHasone  Injectable 6 milliGRAM(s) IV Push daily  metoprolol succinate ER 50 milliGRAM(s) Oral daily  pantoprazole    Tablet 40 milliGRAM(s) Oral before breakfast  piperacillin/tazobactam IVPB.. 3.375 Gram(s) IV Intermittent every 12 hours  polyethylene glycol 3350 17 Gram(s) Oral two times a day  senna 2 Tablet(s) Oral at bedtime  sodium chloride 0.65% Nasal 1 Spray(s) Both Nostrils two times a day  sodium chloride 0.9% for Nebulization 3 milliLiter(s) Nebulizer every 6 hours  tiotropium 2.5 MICROgram(s) Inhaler 2 Puff(s) Inhalation daily    PRN Inpatient Medications  acetaminophen     Tablet .. 650 milliGRAM(s) Oral every 6 hours PRN      FOCUSED REVIEW OF SYSTEMS  --------------------------------------------------------------------------------  unable to obtain, patient somnolent      VITALS/PHYSICAL EXAM  --------------------------------------------------------------------------------  T(C): 37 (02-29-24 @ 05:05), Max: 37.2 (02-28-24 @ 22:14)  HR: 99 (02-29-24 @ 05:05) (72 - 105)  BP: 105/56 (02-29-24 @ 05:05) (105/56 - 154/77)  RR: 20 (02-29-24 @ 05:05) (20 - 22)  SpO2: 96% (02-29-24 @ 05:05) (94% - 100%)  Wt(kg): --        02-28-24 @ 07:01  -  02-29-24 @ 07:00  --------------------------------------------------------  IN: 0 mL / OUT: 140 mL / NET: -140 mL      Physical Exam:  	Gen: frail appearing, head inclined in bed  	Pulm: on BiPAP, coarse breath sounds b/l. L CT draining sanguinous fluid  	CV: RRR, S1S2  	Abd: +BS, soft, nondistended  	: No suprapubic distention.  no lewis          Extremity: No LE edema  	Neuro: somnolent, unresponsive to tactile stimulus      LABS/STUDIES  --------------------------------------------------------------------------------              10.3   19.87 >-----------<  230      [02-29-24 @ 03:24]              34.0     149  |  103  |  132  ----------------------------<  128      [02-29-24 @ 03:24]  5.5   |  31  |  3.65        Ca     8.8     [02-29-24 @ 03:24]      Mg     3.50     [02-29-24 @ 03:24]      Phos  8.4     [02-29-24 @ 03:24]        PTT: 74.6       [02-29-24 @ 03:24]        Creatinine Trend:  SCr 3.65 [02-29 @ 03:24]  SCr 2.90 [02-28 @ 02:45]  SCr 2.72 [02-27 @ 04:38]  SCr 2.68 [02-26 @ 15:00]  SCr 2.60 [02-26 @ 03:49]              Urinalysis - [02-29-24 @ 03:24]      Color  / Appearance  / SG  / pH       Gluc 128 / Ketone   / Bili  / Urobili        Blood  / Protein  / Leuk Est  / Nitrite       RBC  / WBC  / Hyaline  / Gran  / Sq Epi  / Non Sq Epi  / Bacteria       Iron 58, TIBC 275, %sat 21      [01-02-24 @ 07:15]  Ferritin 298      [01-02-24 @ 07:15]  HbA1c 5.3      [06-01-19 @ 05:00]  TSH 1.193      [11-24-23 @ 06:55]  Lipid: chol 103, TG 61, HDL 48, LDL --      [02-07-24 @ 06:49]

## 2024-02-29 NOTE — PROGRESS NOTE ADULT - NS ATTEST RISK PROBLEM GEN_ALL_CORE FT
Patient is seriously ill with acute hypoxemic respiratory failure- progression of disease   High risk of complications, further morbidity and mortality   Decision for DNR/DNI Decision to deescalate care due to poor prognosis - now comfort care, actively dying   IV controlled substances

## 2024-02-29 NOTE — PROVIDER CONTACT NOTE (OTHER) - REASON
Pt Appt 28.3
pt lethargic, unable to tolerate po medication
patient had 3 4x4 gauze saturated with sanguineous drainage from chest tube drainage site
pt. complained about pain around the chest tube site
14 beats asymptomatic vtach
Blood saturated chest tube dressing
Pt Appt 91.9
Pt profusely bleeding from old iv site
patient has hematoma below area of IV site infusing heparin at 8cc/hr
Pt profusely bleeding from Chest tube site
patient had 3 4x4 gauze saturated with sanguineous drainage from chest tube drainage site
patient tachy to 150s nonsustained while attempting to ambulate. Patient NPO except meds
patient had 14 bts vtach
pt lethargic, unable to tolerate po medication
8 beats vtach
pt. aPTT resulted 56.1, no change made to rate of heparin drip
Bradycardic to 37 on tele, unsustained
Brief angelica to 39
patient has lasix parameter to hold for diastolic less than 90
pt lethargic, unable to tolerate po medication

## 2024-02-29 NOTE — PROGRESS NOTE ADULT - SUBJECTIVE AND OBJECTIVE BOX
Indication for Palliative care- f/u GOC     INTERVAL EVENTS: Overnight and this AM, two RRTs for worsening hypoxia and mental status, placed on max bipap. Patient seen this AM with daughter Lora and her  at bedside. Patient is unresponsive, agonally breathing on bipap. See below for GOC.     ------------------------------------------------------------------------------------------------------------    PRESENT SYMPTOMS:     [X ] Unable to self-report      [X ] PAINADS     [X ] RDOS    [ ] No     [ ] Yes     Source if other than patient:  [ ]Family   [ ]Team     PAIN:   If blank, patient unable to specify     [ ]yes [x ]no    1. Location-   2. Radiation-    3. Quality-   4. Timing-   5. Minimal acceptable level/pain goal-   6. Aggravating factors-   7. QOL impact-     SYMPTOMS:   Dyspnea:                           [ ]Mild [ ]Moderate [x ]Severe  Anxiety:                             [ ]Mild [ ]Moderate [ ]Severe  Fatigue:                             [ ]Mild [ ]Moderate [ ]Severe  Nausea/Vomiting:              [ ]Mild [ ]Moderate [ ]Severe  Loss of appetite:                [ ]Mild [ ]Moderate [ ]Severe  Constipation:                     [ ]Mild [ ]Moderate [ ]Severe    Other Symptoms:  [ ]All other review of systems negative     -----------------------------------------------------------------------------------------------------------    FUNCTIONAL STATUS:     Baseline ADL (prior to admission):  [ ] Independent  [x] Moderate Assistance [X ] Dependent    Palliative Performance Score (current): 10%     [x]PPSV2 < or = to 30%   [ ]artificial nutrition      NUTRITIONAL STATUS:     Protein Calorie Malnutrition Present:   [ ]mild   [X]moderate or severe    Weight:   [ ]underweight (BMI 18.5 or less)   [ ]morbid obesity (BMI 30 or higher)   [ ]anasarca  [X ]significant weight loss     Height (cm): 167.6 (02-16-24 @ 02:40), 167.6 (02-06-24 @ 22:00), 167.6 (12-18-23 @ 09:52)  Weight (kg): 70.3 (02-16-24 @ 02:40), 70.3 (02-06-24 @ 22:00), 74.6 (01-04-24 @ 08:00)  BMI (kg/m2): 25 (02-16-24 @ 02:40), 25 (02-06-24 @ 22:00), 26.6 (01-04-24 @ 08:00)    ------------------------------------------------------------------------------------------------------------    PRIOR ADVANCE DIRECTIVES:    [ ] DNR/MOLST    [ ] Living Will    [ ] Health Care Proxy(s)    DECISION MAKER(s):  [ ] Patient    [x ] Surrogate(s) Shaq Escoto, Lora Antony  [ ] HCP   [ ] Guardian             ------------------------------------------------------------------------------------------------------------  PHYSICAL EXAM:    Vital Signs Last 24 Hrs  T(C): 37 (29 Feb 2024 05:05), Max: 37.2 (28 Feb 2024 22:14)  T(F): 98.6 (29 Feb 2024 05:05), Max: 99 (28 Feb 2024 22:14)  HR: 99 (29 Feb 2024 05:05) (72 - 105)  BP: 105/56 (29 Feb 2024 05:05) (105/56 - 154/77)  BP(mean): --  RR: 20 (29 Feb 2024 05:05) (20 - 22)  SpO2: 96% (29 Feb 2024 05:05) (94% - 100%)    Parameters below as of 29 Feb 2024 05:05  Patient On (Oxygen Delivery Method): BiPAP/CPAP      GENERAL:  [ ]Cachexia  [x ] Frail  [x ]Awake  [ ]Oriented x   [ ]Lethargic  [ ]Unarousable  [x ]Verbal  [ ]Non-Verbal    BEHAVIORAL:   [ ] Anxiety  [x ] Delirium [x] Agitation [ ] Other    HEENT:   [x ]Normal   [ ]Dry mouth   [ ]ET Tube/Trach  [ ]Oral lesions    PULMONARY:   [ ]Clear              [ ]Tachypnea  [ ]Audible excessive secretions   [ ]Rhonchi        [ ]Right [ ]Left [ ]Bilateral  [ ]Crackles        [ ]Right [ ]Left [ ]Bilateral  [ ]Wheezing     [ ]Right [ ]Left [ ]Bilateral  [x ]Diminished breath sounds [ ]right [x ]left [ ]bilateral L CHEST WALL TUBE DRAINING SEROSANGUINOUS FLUID    CARDIOVASCULAR:    [ ]Regular [x ]Irregular [ ]Tachy  [ ]Abdirashid [ ]Murmur [ ]Other    GASTROINTESTINAL:  [x ]Soft  [ ]Distended   [ ]+BS  [ ]Non tender [ ]Tender  [ ]Other [ ]PEG [ ]OGT/ NGT      GENITOURINARY:  [ ]Normal [x ] Incontinent   [ ]Oliguria/Anuria   [ ]Aguiar    MUSCULOSKELETAL:   [ ]Normal   [x ]Weakness  [ ]Bed/Wheelchair bound [ ]Edema left leg edema > right    NEUROLOGIC:   [X]No focal deficits  [ ]Cognitive impairment  [ ]Dysphagia [ ]Dysarthria [ ]Paresis [ ]Other     SKIN:   [X ]Normal  [ ]Rash  [ ]Other RN NOTE ON SKIN REVIEWED IN CHART  [ ]Pressure ulcer(s)       Present on admission [ ]y [ ]n    ------------------------------------------------------------------------------------------------------------    LABS:  Complete Blood Count + Automated Diff in AM (02.28.24 @ 02:45)   IANC: 15.28: IANC (instrument absolute neutrophil count) is based on the instrument   calculation which may differ from ANC (manual absolute neutrophil count)   since it is based on the calculation from a manual differential. K/uL  Nucleated RBC #: 0.02 K/uL  WBC Count: 18.53 K/uL  RBC Count: 3.19 M/uL  Hemoglobin: 9.8 g/dL  Hematocrit: 31.9 %  Mean Cell Volume: 100.0 fL  Mean Cell Hemoglobin: 30.7 pg  Mean Cell Hemoglobin Conc: 30.7 gm/dL  Red Cell Distrib Width: 15.7 %  Platelet Count - Automated: 218 K/uL  Auto Neutrophil #: 15.28 K/uL  Auto Lymphocyte #: 1.13 K/uL  Auto Monocyte #: 1.64 K/uL  Auto Eosinophil #: 0.00 K/uL  Auto Basophil #: 0.05 K/uL  Auto Neutrophil %: 82.4: Differential percentages must be correlated with absolute numbers for   clinical significance. %  Auto Lymphocyte %: 6.1 %  Auto Monocyte %: 8.9 %  Auto Eosinophil %: 0.0 %  Auto Basophil %: 0.3 %  Auto Immature Granulocyte %: 2.3    CMP 2/28/24  creatinine secretion. mL/min/1.73m2  Sodium: 148 mmol/L  Potassium: 4.5 mmol/L  Chloride: 103 mmol/L  Carbon Dioxide: 31 mmol/L  Anion Gap: 14 mmol/L  Blood Urea Nitrogen: 117 mg/dL  Creatinine: 2.90 mg/dL  Glucose: 131 mg/dL  Calcium: 8.7 mg/dL  Magnesium: 3.30 mg/dL  Phosphorus: 5.3 mg/dL       ------------------------------------------------------------------------------------------------------------    CRITICAL CARE:  [ ]Shock Present  [ ]Septic [ ]Cardiogenic [ ]Neurologic [ ]Hypovolemic [ ] Undifferentiated/Mixed   [ ]Vasopressors [ ]Inotropes     [X ]Respiratory failure present: [ ]Acute  [ ]Chronic [ ]Hypoxic  [ ]Hypercarbic   [ ]Mechanical ventilation   [ ]Trach collar   [X ]Non-invasive ventilatory support   [ ]High flow    [ ]Non-rebreather/Venti     [ ]Other organ failure     ------------------------------------------------------------------------------------------------------------    RADIOLOGY & ADDITIONAL STUDIES:   US DPLX LWR EXT VEINS COMPL BI   ORDERED BY: MORALES PARK     PROCEDURE DATE:  02/27/2024   INTERPRETATION:  CLINICAL INDICATION: asymmetry    TECHNIQUE: Grayscale, color Doppler and spectral Doppler ultrasound was   utilized to evaluate bilateral lower extremity deep venous system.    COMPARISON: 11/25/2023.    FINDINGS: There is no obvious thrombus in bilateral common femoral veins,   femoral veins or popliteal veins. Left mid/distal calf veins could not be   assessed due to overlying bandage. Visualized calf veins are patent.    IMPRESSION:    No obvious thrombosis at the visualized bilateral extremity deep veins.    --- End of Report ---    US KIDNEYS AND BLADDER   ORDERED BY: MORALES PARK     PROCEDURE DATE:  02/27/2024      INTERPRETATION:  CLINICAL INDICATION: elevated creatinine    TECHNIQUE: Ultrasound of the kidneys and bladder was performed.    COMPARISON: 9/21/2023. 1/1/2024. 2/8/2024.    FINDINGS: This study was technically difficult due to patient position.    Right kidney: 12.1 cm in length. No hydronephrosis. Increased   echogenicity, reflecting parenchymal disease. Multiple cysts measuring up   to 6.1 x 5.8 x 6.2 cm.    Left kidney: Atrophic, measuring 8.4 cm in length. No hydronephrosis.   Increased echogenicity, reflecting parenchymal disease. A 1.6 x 1.6 x 1.9   cm cyst.    Bladder: Partially distended. Usual jets visualized.  Volume: 128 ml.    IMPRESSION:    No hydronephrosis. Additional findings as described.      CXR 2/27/28    FINDINGS:    Left-sided pigtail catheter in place.  Cardiomegaly with bilateral effusions slightly worse from the study of   the day before.  Visualized upper lung fields show no focal abnormalities.  No pneumothorax.    ------------------------------------------------------------------------------------------------------------    ALLERGIES:  Allergies    No Known Allergies    Intolerances    MEDICATIONS  (STANDING):  albuterol    90 MICROgram(s) HFA Inhaler 2 Puff(s) Inhalation every 6 hours  allopurinol 100 milliGRAM(s) Oral daily  aspirin enteric coated 81 milliGRAM(s) Oral daily  atorvastatin 40 milliGRAM(s) Oral at bedtime  budesonide 160 MICROgram(s)/formoterol 4.5 MICROgram(s) Inhaler 2 Puff(s) Inhalation two times a day  dexAMETHasone  Injectable 6 milliGRAM(s) IV Push daily  metoprolol succinate ER 50 milliGRAM(s) Oral daily  pantoprazole    Tablet 40 milliGRAM(s) Oral before breakfast  piperacillin/tazobactam IVPB.. 3.375 Gram(s) IV Intermittent every 12 hours  polyethylene glycol 3350 17 Gram(s) Oral two times a day  senna 2 Tablet(s) Oral at bedtime  sodium chloride 0.65% Nasal 1 Spray(s) Both Nostrils two times a day  sodium chloride 0.9% for Nebulization 3 milliLiter(s) Nebulizer every 6 hours  tiotropium 2.5 MICROgram(s) Inhaler 2 Puff(s) Inhalation daily    MEDICATIONS  (PRN):  acetaminophen     Tablet .. 650 milliGRAM(s) Oral every 6 hours PRN Temp greater or equal to 38C (100.4F), Mild Pain (1 - 3)         Indication for Palliative care- f/u GOC     INTERVAL EVENTS: Overnight and this AM, two RRTs for worsening hypoxia and mental status, placed on max bipap. Patient seen this AM with daughter Lora and her  at bedside. Patient is unresponsive, agonally breathing on bipap. See below for GOC.     ------------------------------------------------------------------------------------------------------------    PRESENT SYMPTOMS:     [X ] Unable to self-report      [X ] PAINADS     [X ] RDOS    [ ] No     [ ] Yes     Source if other than patient:  [ ]Family   [ ]Team     PAIN:   If blank, patient unable to specify     [ ]yes [x ]no    1. Location-   2. Radiation-    3. Quality-   4. Timing-   5. Minimal acceptable level/pain goal-   6. Aggravating factors-   7. QOL impact-     SYMPTOMS:   Dyspnea:                           [ ]Mild [ ]Moderate [x ]Severe  Anxiety:                             [ ]Mild [ ]Moderate [ ]Severe  Fatigue:                             [ ]Mild [ ]Moderate [ ]Severe  Nausea/Vomiting:              [ ]Mild [ ]Moderate [ ]Severe  Loss of appetite:                [ ]Mild [ ]Moderate [ ]Severe  Constipation:                     [ ]Mild [ ]Moderate [ ]Severe    Other Symptoms:  [ ]All other review of systems negative     ------------------------------------------------------------------------------------------------------------  PHYSICAL EXAM:    Vital Signs Last 24 Hrs  T(C): 37 (29 Feb 2024 05:05), Max: 37.2 (28 Feb 2024 22:14)  T(F): 98.6 (29 Feb 2024 05:05), Max: 99 (28 Feb 2024 22:14)  HR: 99 (29 Feb 2024 05:05) (72 - 105)  BP: 105/56 (29 Feb 2024 05:05) (105/56 - 154/77)  BP(mean): --  RR: 20 (29 Feb 2024 05:05) (20 - 22)  SpO2: 96% (29 Feb 2024 05:05) (94% - 100%)    Parameters below as of 29 Feb 2024 05:05  Patient On (Oxygen Delivery Method): BiPAP/CPAP      GENERAL:  [ ]Cachexia  [x ] Frail  [x ]Awake  [ ]Oriented x   [ ]Lethargic  [ ]Unarousable  [x ]Verbal  [ ]Non-Verbal    BEHAVIORAL:   [ ] Anxiety  [x ] Delirium [x] Agitation [ ] Other    HEENT:   [x ]Normal   [ ]Dry mouth   [ ]ET Tube/Trach  [ ]Oral lesions    PULMONARY:   [ ]Clear              [ ]Tachypnea  [ ]Audible excessive secretions   [ ]Rhonchi        [ ]Right [ ]Left [ ]Bilateral  [ ]Crackles        [ ]Right [ ]Left [ ]Bilateral  [ ]Wheezing     [ ]Right [ ]Left [ ]Bilateral  [x ]Diminished breath sounds [ ]right [x ]left [ ]bilateral L CHEST WALL TUBE DRAINING SEROSANGUINOUS FLUID    CARDIOVASCULAR:    [ ]Regular [x ]Irregular [ ]Tachy  [ ]Abdirashid [ ]Murmur [ ]Other    GASTROINTESTINAL:  [x ]Soft  [ ]Distended   [ ]+BS  [ ]Non tender [ ]Tender  [ ]Other [ ]PEG [ ]OGT/ NGT      GENITOURINARY:  [ ]Normal [x ] Incontinent   [ ]Oliguria/Anuria   [ ]Aguiar    MUSCULOSKELETAL:   [ ]Normal   [x ]Weakness  [ ]Bed/Wheelchair bound [ ]Edema left leg edema > right    NEUROLOGIC:   [X]No focal deficits  [ ]Cognitive impairment  [ ]Dysphagia [ ]Dysarthria [ ]Paresis [ ]Other     SKIN:   [X ]Normal  [ ]Rash  [ ]Other RN NOTE ON SKIN REVIEWED IN CHART  [ ]Pressure ulcer(s)       Present on admission [ ]y [ ]n    ------------------------------------------------------------------------------------------------------------    LABS:                           10.3   19.87 )-----------( 230      ( 29 Feb 2024 03:24 )             34.0     02-29    149<H>  |  103  |  132<H>  ----------------------------<  128<H>  5.5<H>   |  31  |  3.65<H>    Ca    8.8      29 Feb 2024 03:24  Phos  8.4     02-29  Mg     3.50     02-29       ------------------------------------------------------------------------------------------------------------    CRITICAL CARE:  [ ]Shock Present  [ ]Septic [ ]Cardiogenic [ ]Neurologic [ ]Hypovolemic [ ] Undifferentiated/Mixed   [ ]Vasopressors [ ]Inotropes     [X ]Respiratory failure present: [ ]Acute  [ ]Chronic [ ]Hypoxic  [ ]Hypercarbic   [ ]Mechanical ventilation   [ ]Trach collar   [X ]Non-invasive ventilatory support   [ ]High flow    [ ]Non-rebreather/Venti     [ ]Other organ failure     ------------------------------------------------------------------------------------------------------------    RADIOLOGY & ADDITIONAL STUDIES:   US DPLX LWR EXT VEINS COMPL BI   ORDERED BY: MORALES PARK     PROCEDURE DATE:  02/27/2024   INTERPRETATION:  CLINICAL INDICATION: asymmetry    TECHNIQUE: Grayscale, color Doppler and spectral Doppler ultrasound was   utilized to evaluate bilateral lower extremity deep venous system.    COMPARISON: 11/25/2023.    FINDINGS: There is no obvious thrombus in bilateral common femoral veins,   femoral veins or popliteal veins. Left mid/distal calf veins could not be   assessed due to overlying bandage. Visualized calf veins are patent.    IMPRESSION:    No obvious thrombosis at the visualized bilateral extremity deep veins.    --- End of Report ---    US KIDNEYS AND BLADDER   ORDERED BY: MORALES PARK     PROCEDURE DATE:  02/27/2024      INTERPRETATION:  CLINICAL INDICATION: elevated creatinine    TECHNIQUE: Ultrasound of the kidneys and bladder was performed.    COMPARISON: 9/21/2023. 1/1/2024. 2/8/2024.    FINDINGS: This study was technically difficult due to patient position.    Right kidney: 12.1 cm in length. No hydronephrosis. Increased   echogenicity, reflecting parenchymal disease. Multiple cysts measuring up   to 6.1 x 5.8 x 6.2 cm.    Left kidney: Atrophic, measuring 8.4 cm in length. No hydronephrosis.   Increased echogenicity, reflecting parenchymal disease. A 1.6 x 1.6 x 1.9   cm cyst.    Bladder: Partially distended. Usual jets visualized.  Volume: 128 ml.    IMPRESSION:    No hydronephrosis. Additional findings as described.      CXR 2/27/28    FINDINGS:    Left-sided pigtail catheter in place.  Cardiomegaly with bilateral effusions slightly worse from the study of   the day before.  Visualized upper lung fields show no focal abnormalities.  No pneumothorax.    ------------------------------------------------------------------------------------------------------------    ALLERGIES:  Allergies    No Known Allergies    Intolerances    MEDICATIONS  (STANDING):  albuterol    90 MICROgram(s) HFA Inhaler 2 Puff(s) Inhalation every 6 hours  allopurinol 100 milliGRAM(s) Oral daily  aspirin enteric coated 81 milliGRAM(s) Oral daily  atorvastatin 40 milliGRAM(s) Oral at bedtime  budesonide 160 MICROgram(s)/formoterol 4.5 MICROgram(s) Inhaler 2 Puff(s) Inhalation two times a day  dexAMETHasone  Injectable 6 milliGRAM(s) IV Push daily  metoprolol succinate ER 50 milliGRAM(s) Oral daily  pantoprazole    Tablet 40 milliGRAM(s) Oral before breakfast  piperacillin/tazobactam IVPB.. 3.375 Gram(s) IV Intermittent every 12 hours  polyethylene glycol 3350 17 Gram(s) Oral two times a day  senna 2 Tablet(s) Oral at bedtime  sodium chloride 0.65% Nasal 1 Spray(s) Both Nostrils two times a day  sodium chloride 0.9% for Nebulization 3 milliLiter(s) Nebulizer every 6 hours  tiotropium 2.5 MICROgram(s) Inhaler 2 Puff(s) Inhalation daily    MEDICATIONS  (PRN):  acetaminophen     Tablet .. 650 milliGRAM(s) Oral every 6 hours PRN Temp greater or equal to 38C (100.4F), Mild Pain (1 - 3)

## 2024-02-29 NOTE — PROGRESS NOTE ADULT - PROBLEM SELECTOR PLAN 8
- TTE 2/7 demonstrating EF 55-60%, moderate pulmonary hypertension, moderate mitral stenosis, severely enlarged b/l atria

## 2024-02-29 NOTE — PROGRESS NOTE ADULT - ATTENDING COMMENTS
89-year-old man with a past medical history of A-fib on apixaban, HFrEF EF 45, CVA, COPD, CKD 3 presents with acute on chronic hypoxic respiratory failure in the setting of COPD and heart failure exacerbation.  He was also found to have a left-sided pleural effusion.  He underwent thoracentesis on February 8 with 1 L bloody drainage.  He was transferred to [facility] for pleurX evaluation and possible EBUS.  Course further complicated by COVID-pneumonia infection as well as likely superimposed bacterial pneumonia with mucous plugging.    -Now status post chest tube placement by CT surgery. Chest tube in place today, tidaling, no air leak.   - Despite optimal therapy for COPD and COVID, patient clinically deteriorating. Worsened encephalopathy and respiratory. Now on BiPAP, transitioned to AVAPS at bedside this am.  However per primary team, patient to be transitioned to comfort care at this time

## 2024-02-29 NOTE — PROGRESS NOTE ADULT - CONVERSATION DETAILS
Pt with worsening respiratory status overnight, hypoxic and agonal breathing on max BIPAP settings. Frida+, Lora+, and pt sister all present and in agreement with continuing BIPAP with focus on comfort directed care. Want to keep BIPAP on until pt granddaughter arrives 2/29 evening (11pm) after which they are amenable to discontinue BIPAP. Agreeable to start dilaudid for discomfort/air hunger. Understanding that he may pass prior to granddaughter arriving. Agreeable to stop labwork, telemetry monitoring, and focusing on symptom management and comfort.

## 2024-02-29 NOTE — RAPID RESPONSE TEAM SUMMARY - NSADDTLFINDINGSRRT_GEN_ALL_CORE
On arrival patient saturating mid 90s. Remainder of vital signs stable. Per primary RN and primary team at bedside patient back to baseline RRT ended.

## 2024-02-29 NOTE — PROGRESS NOTE ADULT - ASSESSMENT
89 y/o male with IRINEO on CKD (baseline Creatinine 1.5 12/2023), h/o CHF, being treated for pneumonia (?aspiration).

## 2024-02-29 NOTE — PROVIDER CONTACT NOTE (OTHER) - ASSESSMENT
pt profusely bleeding from chest tube site. pressure dressing applied. New dressing applied. heparin held and to be readmin at 0700. ACP come to bedside.

## 2024-02-29 NOTE — PROGRESS NOTE ADULT - PROBLEM SELECTOR PLAN 2
- 2/28- [facility] discussed with family as above. Patient's two daughters agreed to DNR/DNI, MOLST placed in chart - 2/28- GOC discussed with family as above. Patient's two daughters agreed to DNR/DNI, MOLST placed in chart  - 2/29- See GOC. Family agrees with comfort care, continue bipap until more family arrives tonight

## 2024-02-29 NOTE — PROGRESS NOTE ADULT - PROBLEM SELECTOR PLAN 3
Family meeting held 2/28 with palliative team -- all in agreement for DNR/DNI status as pt unlikely to have meaningful recovery in the event of cardiac arrest/respiratory arrest, and would not want to cause/prolong suffering or need to make a decision to discontinue life support. All seem to understand pt poor prognosis with goal of keeping him alive long enough for [facility] to visit. Defer blood draws, tele monitoring, , IVF, artificial nutrition.  - MOLST updated  - DNR/DNI, comfort measures only

## 2024-02-29 NOTE — RAPID RESPONSE TEAM SUMMARY - NSSITUATIONBACKGROUNDRRT_GEN_ALL_CORE
89 yo M with PMH CVA, a-fib (eliquis), CHF (EF 45-50%), HTN, HLD, COPD, and CKD3 p/w SOB.    RRT called for hypoxia. FiO2 on Bipap increased to 100% prior to arrival

## 2024-02-29 NOTE — PROGRESS NOTE ADULT - SUBJECTIVE AND OBJECTIVE BOX
[facility] Department of Hospital Medicine  Cornelia Downing DO  Available on MS Teams  Pager: [phone]    Patient is a 89y old  Male who presents with a chief complaint of Transfer for PleurX (26 Feb 2024 14:06)    Subjective:  Pt seen and examined at bedside very confused and lethargic. Non-participatory with exam. Appears to be agonally breathing.  Daughter Lora and  at bedside. Later, daughter Frida and pt sister present. All understanding that pt is dying and it is not clear how much time he may or may not have left. Agreeable to start dilaudid for discomfort. Goal is to keep him alive long enough for nancy to see him (arriving around 11pm this evening from [place]). Understanding that he may pass prior to this. Agreeable to stop labwork, telemetry monitoring; and focusing on symptom management and comfort.    Vital Signs Last 24 Hrs  T(C): 37 (29 Feb 2024 05:05), Max: 37.2 (28 Feb 2024 22:14)  T(F): 98.6 (29 Feb 2024 05:05), Max: 99 (28 Feb 2024 22:14)  HR: 111 (29 Feb 2024 08:44) (72 - 111)  BP: 105/56 (29 Feb 2024 05:05) (105/56 - 150/89)  BP(mean): --  RR: 20 (29 Feb 2024 05:05) (20 - 20)  SpO2: 99% (29 Feb 2024 08:44) (94% - 100%)    Parameters below as of 29 Feb 2024 05:05  Patient On (Oxygen Delivery Method): BiPAP/CPAP    PHYSICAL EXAM:  Constitutional: lethargic, non-participatory   Head: NC/AT  Eyes: PERRL, anicteric sclera  ENT: no nasal discharge; dry MM  Neck: supple; no JVD  Respiratory: diffuse wheezing/crackles B/L; L pigtail with recently changed dressing c/d/i; on BIPAP; agonal breathing   Cardiac: +S1/S2; RRR; no M/R/G  Gastrointestinal: soft, NT/ND; no rebound or guarding; +BSx4  Extremities: WWP, no clubbing or cyanosis; no peripheral edema  Musculoskeletal: non-participatory with exam  Neurologic: AAOx0; CNII-XII grossly intact     MEDICATIONS  (STANDING):  piperacillin/tazobactam IVPB.. 3.375 Gram(s) IV Intermittent every 12 hours    MEDICATIONS  (PRN):  HYDROmorphone  Injectable 0.2 milliGRAM(s) IV Push every 1 hour PRN RR>25  metoprolol tartrate Injectable 5 milliGRAM(s) IV Push every 6 hours PRN sustained HR>110    LABS:                        10.3   19.87 )-----------( 230      ( 29 Feb 2024 03:24 )             34.0     02-29    149<H>  |  103  |  132<H>  ----------------------------<  128<H>  5.5<H>   |  31  |  3.65<H>    Ca    8.8      29 Feb 2024 03:24  Phos  8.4     02-29  Mg     3.50     02-29      PTT - ( 29 Feb 2024 03:24 )  PTT:74.6 sec  Urinalysis Basic - ( 29 Feb 2024 03:24 )    Color: x / Appearance: x / SG: x / pH: x  Gluc: 128 mg/dL / Ketone: x  / Bili: x / Urobili: x   Blood: x / Protein: x / Nitrite: x   Leuk Esterase: x / RBC: x / WBC x   Sq Epi: x / Non Sq Epi: x / Bacteria: x      CAPILLARY BLOOD GLUCOSE      POCT Blood Glucose.: 142 mg/dL (29 Feb 2024 07:16)      RADIOLOGY & ADDITIONAL TESTS: Reviewed.

## 2024-02-29 NOTE — PROGRESS NOTE ADULT - ASSESSMENT
89M with PMH AF (on apixaban), HFmrEF (45), CVA, HTN, COPD, CKD3 left sided pleural effusion s/p previous drainage who presented to [facility] for AoCHypoxRF 2/2 CHF exacerbation and pleff. Thora on 2/8 with bloody output of 1L. Transferred to [facility] for PleurX evaluation and possible EBUS. Patient on diuresis with IV lasix. Found to be COVID+ on 2/19. PCT placed by thoracic.    # Acute on chronic hypoxemic respiratory failure   # CHF exacerbation  # Bloody Pleural effusion s/p CT with non-expandable lung  # COVID19 + - s/p RDV  # R Pneumonia with mucus plugging - s/p CTX    Recommendations  -per primary team patient is to be made comfort care with removal of bipap  -palliative on board for symptom management  -Pulmonary team to sign off, please call back with questions

## 2024-02-29 NOTE — PROGRESS NOTE ADULT - PROBLEM SELECTOR PLAN 1
Bloody Pleural effusion s/p CT with non-expandable lung  # COVID19 + - s/p RDV  # R Pneumonia with mucus plugging - s/p CTX    -Chest tube management and possible future procedures per CTS  - Pulm also following  -Continue to wean down HFNC, if can wean to 40/40 can switch to humidified NC at 6L  -C/w decadron for covid  -C/w diuresus  - Aspiration precautions as necessary  - Consider 0.5 mg q4h IV dilaudid prn for dyspnea if patient having distress from sob  - DVT ppx as tolerated. L Bloody Pleural effusion s/p CT with non-expandable lung  # COVID19 + - s/p RDV  # R Pneumonia with mucus plugging - s/p CTX  - patient actively dying on bipap   - Plan for bipap withdrawal once family arrives- recommend to pre-medicate with IV dilaudid 0.5 mg 20 minutes before withdrawal   - Start IV dilaudid 0.5 mg q1h PRN for dyspnea L Bloody Pleural effusion s/p L CT with non-expandable lung  # COVID19 + - s/p RDV  # R Pneumonia with mucus plugging - s/p CTX  - patient actively dying on bipap   - Patient received IV dilaudid 0.2 mg x 1 dose, per RN patient looked more comfortable.   - c/w IV dilaudid 0.2 mg q1h PRN for dyspnea  - If family agrees with bipap withdrawal later in evening, recommend to pre-medicate 20 minutes before with IV dilaudid 0.2 prior to bipap removal

## 2024-02-29 NOTE — CHART NOTE - NSCHARTNOTEFT_GEN_A_CORE
RRT called for hypoxia pt was satting 70s on BiPAP. FiO2 increased from 50% to 100%. Pt now satting 100%. Attending made aware. RRT called for hypoxia pt was satting 70s on BiPAP. FiO2 increased from 50% to 100%. Pt now satting 100%. Updated family. Attending made aware.

## 2024-02-29 NOTE — PROVIDER CONTACT NOTE (OTHER) - NAME OF MD/NP/PA/DO NOTIFIED:
ACP Grant Rivera
Adrianne Kim ACP
ARMAAN Marmolejo
ELENITA Cervantes
Jevon Coffman
Vicki Marmolejo ACP
dr efe ontiveros
Jayce Harp
enrrique huff acp
enrrique huff acp
[facility] Grant Harp
[facility] Grant Harp
Adrianne Kim ACP
acp tali massey
[facility] Grant Harp
Jazz Dill ACP
acp shilpa chin/dr wilks
Adrianne Kim ACP
Grant Harp
Vicki Marmolejo ACP

## 2024-02-29 NOTE — PROGRESS NOTE ADULT - CONVERSATION DETAILS
Overnight patient had further rapid deterioration. Spoke to daughter Lora initially in the morning. Daughter emotional, wondering why patient deteriorated so quickly. She wishes she had come earlier. She came more quickly because patient had personally called her a few days and asked for her to come. Daughter understands patient has limited time but waiting for her other family to come this morning. She also hopes her daughter Kathe (patient's granddaughter who he raised) is coming from [facility].     Spoke to Lora and then daughter Frida with her  and son. Discussed option of comfort care to prioritize end of life symptom management with deescalation of other interventions that would be burdensome at end of life including continued blood draws, imaging, other investigations, vitals monitoring. Family agree they want him comfortable.     Discussed bipap, how it is uncomfortable and it is advised to be withdrawn. Made aware once bipap comes off patient may die quickly. However family hopes patient's granddaughter Kathe will be able to say goodbye to him. She is planned to come around  PM possibly, flying in from [place]. Family may be open to bipap withdrawal once they all have had a chance to say goodbye. ESTHER updated with comfort care measures.

## 2024-02-29 NOTE — PROVIDER CONTACT NOTE (OTHER) - RECOMMENDATIONS
ACP made aware. Pt now placed on AVAPS. medication held.
No intervention
ELENITA Pollock notified
IV acetaminphen, lidocaine patch
Provider made aware. ACP came to bedside.
Provider made aware. No new orders.
Provider made aware. Stated via teams to administer lasix despite parameters
Provider made aware. no new orders
ACP was made aware and came to bedside to redress chest tube. ACP ordered bedside xray.
Provider made aware.
ARMAAN Marmolejo aware. Will continue to monitor on tele.
Provider made aware. hold heparin and reinitiate at 2 pm.
Provider made aware. no new orders
provider contacted,.
ACP made aware.  Decrease heparin to 7.
Provider made aware. No new orders.
Provider made aware. No new orders.
ACP made aware. Increase heparin to 8.5.
No intervention
Provider made aware.

## 2024-02-29 NOTE — PROVIDER CONTACT NOTE (OTHER) - ACTION/TREATMENT ORDERED:
ACP advised she would make day team aware
Provider made aware. medication held.
Provider made aware. no new orders
Provider made aware. No new orders.
Provider made aware. medication held.
Provider made aware. No new orders.
ARMAAN Marmolejo aware. Will continue to monitor on tele.
IV acetaminphen given , lidocaine patch given
No intervention
Provider made aware. No new orders.
Provider made aware. medication held.
provider notified. awaiting further instruction
New dressing applied. heparin held and to be readmin at 0700.
12 lead EKG ordered. Zoll and atropine placed at bedside. care continues.
ACP made aware. Heparin increased to 8.5, order placed.
Provider made aware. Stated via teams to administer lasix despite parameters
Provider made aware. heparin held.
ACP made aware. Heparin decreased to 7, ordered placed.
No intervention
Provider made aware. no new orders

## 2024-03-01 NOTE — PROGRESS NOTE ADULT - PROBLEM SELECTOR PROBLEM 1
Acute hypoxic respiratory failure
Acute kidney injury superimposed on CKD
Acute hypoxic respiratory failure
Acute kidney injury superimposed on CKD
Acute hypoxic respiratory failure

## 2024-03-01 NOTE — PROGRESS NOTE ADULT - PROVIDER SPECIALTY LIST ADULT
CT Surgery
Cardiology
Thoracic Surgery
Cardiology
Hospitalist
Pulmonology
Thoracic Surgery
Hospitalist
Internal Medicine
Pulmonology
Pulmonology
Thoracic Surgery
Hospitalist
Hospitalist
Nephrology
Nephrology
Palliative Care
Internal Medicine
Hospitalist
Internal Medicine
Hospitalist
Internal Medicine
Palliative Care
Hospitalist
Palliative Care
Internal Medicine
Hospitalist

## 2024-03-01 NOTE — PROGRESS NOTE ADULT - ATTENDING COMMENTS
Patient seen and examined at bedside. Agree with above assessment and plan    Patient with worsening acute hypoxemic respiratory failure on HFNC, s/p L sided chest tube, with persistent serosanguinous fluid drainage. Overall prognosis poor. Mental status worse today. Family agreed to DNR/DNI yesterday. Today spoke to family, who agreed to bipap removal. Explained process of removal. Patient seen again after bipap removal, with cheyne butcher breathing pattern but comfortable. Patient later  in afternoon.

## 2024-03-01 NOTE — PROGRESS NOTE ADULT - PROBLEM SELECTOR PROBLEM 4
Breast mass in male
Pleural effusion
Dysphagia
Acute kidney injury superimposed on CKD
Breast mass in male
Pleural effusion
Pleural effusion
Breast mass in male
Breast mass in male
Palliative care encounter
Pleural effusion
Palliative care encounter
Congestive heart failure
2019 novel coronavirus disease (COVID-19)
Pleural effusion
Congestive heart failure
Pleural effusion

## 2024-03-01 NOTE — PROGRESS NOTE ADULT - ASSESSMENT
Patient is a 89M PMHx A. fib previously on Eliquis, CHF (EF 45-50%, combined systolic and diastolic), CVA, HTN, COPD, CKD3, multiple hospitalizations for CHF exacerbation in the prior months, left sided pleural effusion that was drained last hospitalization, admitted to [facility] on 2/6/2024 for CHF exacerbation and acute on chronic hypoxic respiratory failure 2/2 large left pleural effusion. TTE on 2/7 showed EF 55-60%, pulmonary hypertension, moderate mitral stenosis. He was evaluated by Pulmonary and had Thoracentesis on 2/8, 1 L bloody output drained.  Pulmonology recommended transfer to [facility] for Pleurex catheter and possible EUS for lymph node biopsy.  Patient placed on IV lasix for diuresis.  Pt was also found with 2.5 x 1.4 cm right retroareolar breast nodule larger than 9/19/2023.   Surgery was consulted and recommended outpatient follow-up. Patient also found with LLE blister, which is chronic.  Pt also with IRINEO 2/2 CKD3 on this admission, with Hypernatremia and Hypokalemia, K was repleted. Patient transitioned from Eliquis to heparin gtt during admission due to valvular heart disease. Patient was also evaluated by cardiology for moderate mitral stenosis, CHF, and a. fib, was diuresed with IV Lasix, treated with metoprolol, and recommended for structural heart evaluation knowing that patient is a poor candidate for procedural intervention due to comorbidities. Patient was then transferred to [facility]. Of note, patient was recently admitted in 1/2024 presenting with concern for LE blisters, found to have acute on chronic CHF, large exudative pleural effusion s/p 1L removal, transferred from  for thoracic surgery evaluation and was found to be a poor surgical candidate. IR was consulted and recommended that patient had no indication for a pigtail catheter placement due to resolution of dyspnea. Patient was then discharged. On interview, patient denies chest pain, abdominal pain, reports difficulty urinating, and reports breathing is manageable. Patient endorses productive cough. Patient reports being willing to undergo thoracentesis or PleurX catheter.

## 2024-03-01 NOTE — PROGRESS NOTE ADULT - PROBLEM SELECTOR PLAN 1
- Patient admitted for acute hypoxic respiratory failure 2/2 recurrence of large left pleural effusion   - CT chest 2/8 with lingular and left lower lobe groundglass infiltrates likely pneumonia. Small bilateral pleural effusions. Cardiomegaly. Stable prominent mediastinal lymph nodes  - CXR 2/16 demonstrating increase in the left pleural effusion now moderate to large with associated consolidation  - Thoracic surgery consulted - s/p bedside PTC placement 2/24; likely no further intervention planned   -abx dc'd as comfort measures  -s/p 5days of Remdesevir for covid+  -started dexamethasone for covid 2/24 -- dc for comfort measures   -dc BIPAP today  -comfort measures only  -glycopyrrolate for secretions  -dilaudid for air hunger

## 2024-03-01 NOTE — PROGRESS NOTE ADULT - PROBLEM SELECTOR PROBLEM 11
Need for prophylactic measure
Atrial fibrillation

## 2024-03-01 NOTE — PROGRESS NOTE ADULT - PROBLEM SELECTOR PLAN 7
Patient's Cr on admission appears to be similar to prior baseline Cr   Patient appears to have developed rising Cr within the past 3 years   - Continue to monitor
- TTE 2/7 demonstrating EF 55-60%, moderate pulmonary hypertension, moderate mitral stenosis, severely enlarged b/l atria   - C/w Lasix 40 IV daily- hold due to IRINEO- monitor renal function, resume when able  - Monitor I/Os
s/p [facility] 2/21  -NPO as pt on BIPAP  -comfort measures  -pleasure feeds if requesting
s/p MBS 2/21  -Minced and Moist with Moderately Thick Liquids per speech  -aspiration precautions  -pt with worsening dysphagia- speech re-eval pending
Patient's Cr on admission appears to be similar to prior baseline Cr   Patient appears to have developed rising Cr within the past 3 years   - Continue to monitor
s/p MBS 2/21  -Minced and Moist with Moderately Thick Liquids per speech  -aspiration precautions  -pt with worsening dysphagia- speech re-eval pending
- CT chest 2/8 demonstrating 2.5 x 1.4 cm right retroareolar breast nodule increased in size since 9/2023 assessment  - Surgery consulted at OSH, recommended outpatient follow-up
- Baseline Cr 1.5-1.7  - Cr stable in the 1.3-1.5 range  - Continue to monitor  - Avoid nephrotoxic medications  - Renally dose all medications
- High risk of COPD contributing to acute hypoxic respiratory failure due to smoking history   - Casey q6h PRN   - C/w Symbicort and Spiriva
s/p MBS 2/21  -Minced and Moist with Moderately Thick Liquids per speech  -aspiration precautions  -pt with worsening dysphagia- speech re-eval pending
s/p [facility] 2/21  -NPO    -comfort measures  -pleasure feeds if requesting
Patient's Cr on admission appears to be similar to prior baseline Cr   Patient appears to have developed rising Cr within the past 3 years   - Continue to monitor
s/p MBS 2/21  -Minced and Moist with Moderately Thick Liquids per speech  -aspiration precautions  -pt with worsening dysphagia- speech re-eval
- Hx of AFib   - Monitor on telemetry (telemetry with AFib to 100s 2/19)  - C/w metoprolol succinate 50 mg qd   - C/w Heparin gtt for full anticoagulation pending procedure  - Keep Mg > 2 and K+ > 4
- Hx of AFib   - Monitor on telemetry (telemetry with AFib to 100s 2/19)  - C/w metoprolol succinate 50 mg qd   - C/w Heparin gtt for full anticoagulation pending procedure  - Keep Mg > 2 and K+ > 4

## 2024-03-01 NOTE — PROGRESS NOTE ADULT - REASON FOR ADMISSION
Transfer for PleurX

## 2024-03-01 NOTE — PROGRESS NOTE ADULT - PROBLEM SELECTOR PROBLEM 2
Pleural effusion
2019 novel coronavirus disease (COVID-19)
Pleural effusion
Pneumonia
Pleural effusion
Pneumonia
Comfort measures only status
2019 novel coronavirus disease (COVID-19)
Pleural effusion
Pleural effusion
ACP (advance care planning)
2019 novel coronavirus disease (COVID-19)
Pneumonia
Pneumonia
At risk for pain
Pneumonia

## 2024-03-01 NOTE — PROGRESS NOTE ADULT - PROBLEM SELECTOR PLAN 2
worsening infiltrates on CXR despite Abx, Remdesevir  -Sputum cx: normal blaine  -urine legionella negative  -s/p Remdesevir   -comfort measures as above

## 2024-03-01 NOTE — PROGRESS NOTE ADULT - PROBLEM SELECTOR PLAN 5
- CT chest 2/8 demonstrating 2.5 x 1.4 cm right retroareolar breast nodule increased in size since 9/2023 assessment  - Surgery consulted at OSH, recommended outpatient follow-up
s/p MBS 2/21  -Minced and Moist with Moderately Thick Liquids per speech  -aspiration precautions
High risk of COPD contributing to acute hypoxic respiratory failure due to smoking history   - Duonebs q6h PRN   - C/w symbicort and spiriva
- TTE 2/7 demonstrating EF 55-60%, moderate pulmonary hypertension, moderate mitral stenosis, severely enlarged b/l atria   - C/w Lasix 40 IV daily- hold due to IRINEO- monitor renal function, resume when able  - Monitor I/Os
s/p Remdesevir   -dexamethasone as above  -c/w hycodan ATC x2d
High risk of COPD contributing to acute hypoxic respiratory failure due to smoking history   - Duonebs q6h PRN   - C/w symbicort and spiriva
High risk of COPD contributing to acute hypoxic respiratory failure due to smoking history   - Duonebs q6h PRN   - C/w symbicort and spiriva
creat 2.1 from Baseline Cr 1.5-1.7  -likely pre-renal, over diuresis, poor po intake  - continue to hold diuresis  -check urine electrolytes- if pre-renal will give gentle IVFs
- High risk of COPD contributing to acute hypoxic respiratory failure due to smoking history   - Casey q6h PRN   - C/w Symbicort and Spiriva
s/p Remdesevir   -dexamethasone as above  -c/w hycodan ATC x3d
s/p Remdesevir   -dexamethasone as above  -start hycodan ATC x2d
s/p Remdesevir   -started dexamethasone  -robitussin prn
- CT chest 2/8 demonstrating 2.5 x 1.4 cm right retroareolar breast nodule increased in size since 9/2023 assessment  - Surgery consulted at OSH, recommended outpatient follow-up
s/p Remdesevir   comfort measures
s/p Remdesevir   comfort measures

## 2024-03-01 NOTE — PROGRESS NOTE ADULT - PROBLEM SELECTOR PROBLEM 6
Atrial fibrillation
Acute kidney injury superimposed on CKD
Congestive heart failure
Atrial fibrillation
Atrial fibrillation
Dysphagia
Acute kidney injury superimposed on CKD
Breast mass in male
Atrial fibrillation
COPD, severity to be determined
Acute kidney injury superimposed on CKD
COPD, severity to be determined
Acute kidney injury superimposed on CKD

## 2024-03-01 NOTE — DISCHARGE NOTE FOR THE EXPIRED PATIENT - HOSPITAL COURSE
89 M with PMH CVA, Afib (Eliquis), CHF (EF 45-50%), HTN, HLD, COPD, and CKD3 p/w SOB. Found to have L pleural effusion and COVID S/P Remdesivir. Course c/b worsening hypoxia, started on Zosyn and steroids, now on HFNC with pulm and CTSx following S/P PTC 2/23 - likely no plan for further intervention. On heparin for A-fib with intermittent bleeding (chest tube site, skin lac) briefly held, now with pt specific PTT 40-60 goal. GOC meeting held with pt's daughters 2/28, palliative following, now DNR/DNI and comfort measures due to worsening hypoxia/hypercarbia and need for BIPAP. BIPAP removed 3/1. Started on Dilaudid/Glycopyrrolate.

## 2024-03-01 NOTE — PROGRESS NOTE ADULT - PROBLEM SELECTOR PLAN 3
- 2/28- GOC discussed with family as above. Patient's two daughters agreed to DNR/DNI, MOLST placed in chart  - 2/29- See GOC. Family agrees with comfort care, continue bipap until more family arrives tonight  - 3/1- Bipap stopped, comfort care as above

## 2024-03-01 NOTE — PROGRESS NOTE ADULT - SUBJECTIVE AND OBJECTIVE BOX
Indication for Palliative care- f/u [facility]     INTERVAL EVENTS:  Family reached decision to make patient comfort measures yesterday, however requested waiting for more family to arrive prior to dc'ing Bipap.  Family arrived overnight, agree to remove bipap mask this morning.  Patient received dilaudid 0.2 mg IV x1 in the last 24 hours.  Patient seen at bedside this morning and does not appear to be in marked distress but is shallow breathing and tachypneic.   ------------------------------------------------------------------------------------------------------------    PRESENT SYMPTOMS:     [X ] Unable to self-report      [X ] PAINADS     [X ] RDOS    [ ] No     [ ] Yes     Source if other than patient:  [ ]Family   [ ]Team     PAIN:   If blank, patient unable to specify     [ ]yes [x ]no    1. Location-   2. Radiation-    3. Quality-   4. Timing-   5. Minimal acceptable level/pain goal-   6. Aggravating factors-   7. QOL impact-     SYMPTOMS:   Dyspnea:                           [ ]Mild [ ]Moderate [x ]Severe  Anxiety:                             [ ]Mild [ ]Moderate [ ]Severe  Fatigue:                             [ ]Mild [ ]Moderate [ ]Severe  Nausea/Vomiting:              [ ]Mild [ ]Moderate [ ]Severe  Loss of appetite:                [ ]Mild [ ]Moderate [ ]Severe  Constipation:                     [ ]Mild [ ]Moderate [ ]Severe    Other Symptoms:  [ ]All other review of systems negative PATIENT UNABLE TO PARTAKE IN ROS    ------------------------------------------------------------------------------------------------------------  PHYSICAL EXAM:    PHYSICAL EXAM:  Vital Signs Last 24 Hrs  T(C): 37.8 (29 Feb 2024 20:00), Max: 37.8 (29 Feb 2024 20:00)  T(F): 100 (29 Feb 2024 20:00), Max: 100 (29 Feb 2024 20:00)  HR: 75 (29 Feb 2024 20:00) (75 - 109)  BP: 96/60 (29 Feb 2024 20:00) (96/60 - 96/60)  BP(mean): --  RR: 20 (29 Feb 2024 20:00) (20 - 20)  SpO2: 97% (01 Mar 2024 07:58) (92% - 99%)    Parameters below as of 29 Feb 2024 20:00  Patient On (Oxygen Delivery Method): BiPAP/CPAP     I&O's Summary    29 Feb 2024 07:01  -  01 Mar 2024 07:00  --------------------------------------------------------  IN: 0 mL / OUT: 100 mL / NET: -100 mL       GENERAL:  [ ]Cachexia  [x ] Frail  [ ]Awake  [ ]Oriented x   [ ]Lethargic  [X ]Unarousable  [ ]Verbal  [X ]Non-Verbal    BEHAVIORAL:   [ ] Anxiety  [ ] Delirium [ ] Agitation [ ] Other    HEENT:   [ ]Normal   [X ]Dry mouth   [ ]ET Tube/Trach  [ ]Oral lesions    PULMONARY:   [ ]Clear              [X ]Tachypnea  [ ]Audible excessive secretions   [ ]Rhonchi        [ ]Right [ ]Left [ ]Bilateral  [X ]Crackles        [ ]Right [ ]Left [X ]Bilateral  [ ]Wheezing     [ ]Right [ ]Left [ ]Bilateral  [x ]Diminished breath sounds [ ]right [x ]left [ ]bilateral L CHEST WALL TUBE DRAINING SEROSANGUINOUS FLUID    CARDIOVASCULAR:    [ ]Regular [x ]Irregular [ ]Tachy  [ ]Abdirashid [ ]Murmur [ ]Other    GASTROINTESTINAL:  [x ]Soft  [ ]Distended   [ ]+BS  [ ]Non tender [ ]Tender  [ ]Other [ ]PEG [ ]OGT/ NGT      GENITOURINARY:  [ ]Normal [x ] Incontinent   [ ]Oliguria/Anuria   [ ]Aguiar    MUSCULOSKELETAL:   [ ]Normal   [x ]Weakness  [ ]Bed/Wheelchair bound [ ]Edema left leg edema > right    NEUROLOGIC:   [ ]No focal deficits  [X ]Cognitive impairment  [ ]Dysphagia [ ]Dysarthria [ ]Paresis [ ]Other     SKIN:   [X ]Normal  [ ]Rash  [ ]Other RN NOTE ON SKIN REVIEWED IN CHART  [ ]Pressure ulcer(s)       Present on admission [ ]y [ ]n    LABS:                        10.3   19.87 )-----------( 230      ( 29 Feb 2024 03:24 )             34.0   02-29    149<H>  |  103  |  132<H>  ----------------------------<  128<H>  5.5<H>   |  31  |  3.65<H>    Ca    8.8      29 Feb 2024 03:24  Phos  8.4     02-29  Mg     3.50     02-29    PTT - ( 29 Feb 2024 03:24 )  PTT:74.6 seC   ------------------------------------------------------------------------------------------------------------    CRITICAL CARE:  [ ]Shock Present  [ ]Septic [ ]Cardiogenic [ ]Neurologic [ ]Hypovolemic [ ] Undifferentiated/Mixed   [ ]Vasopressors [ ]Inotropes     [X ]Respiratory failure present: [ ]Acute  [ ]Chronic [ ]Hypoxic  [ ]Hypercarbic   [ ]Mechanical ventilation   [ ]Trach collar   [X ]Non-invasive ventilatory support   [ ]High flow    [ ]Non-rebreather/Venti     [x ]Other organ failure RENAL    ------------------------------------------------------------------------------------------------------------    RADIOLOGY & ADDITIONAL STUDIES:    NO NEW IMAGING SINCE 2/28, PRIORS REVIEWED    ------------------------------------------------------------------------------------------------------------    ALLERGIES:  Allergies    No Known Allergies    Intolerances    MEDICATIONS  (STANDING):  albuterol    90 MICROgram(s) HFA Inhaler 2 Puff(s) Inhalation every 6 hours  allopurinol 100 milliGRAM(s) Oral daily  aspirin enteric coated 81 milliGRAM(s) Oral daily  atorvastatin 40 milliGRAM(s) Oral at bedtime  budesonide 160 MICROgram(s)/formoterol 4.5 MICROgram(s) Inhaler 2 Puff(s) Inhalation two times a day  dexAMETHasone  Injectable 6 milliGRAM(s) IV Push daily  metoprolol succinate ER 50 milliGRAM(s) Oral daily  pantoprazole    Tablet 40 milliGRAM(s) Oral before breakfast  piperacillin/tazobactam IVPB.. 3.375 Gram(s) IV Intermittent every 12 hours  polyethylene glycol 3350 17 Gram(s) Oral two times a day  senna 2 Tablet(s) Oral at bedtime  sodium chloride 0.65% Nasal 1 Spray(s) Both Nostrils two times a day  sodium chloride 0.9% for Nebulization 3 milliLiter(s) Nebulizer every 6 hours  tiotropium 2.5 MICROgram(s) Inhaler 2 Puff(s) Inhalation daily    MEDICATIONS  (PRN):  acetaminophen     Tablet .. 650 milliGRAM(s) Oral every 6 hours PRN Temp greater or equal to 38C (100.4F), Mild Pain (1 - 3)         Indication for Palliative care- f/u [facility]     INTERVAL EVENTS:  Family reached decision to make patient comfort measures yesterday, however requested waiting for more family to arrive prior to dc'ing Bipap.  Family arrived overnight, agree to remove bipap mask this morning.  Patient received dilaudid 0.2 mg IV x1 in the last 24 hours.  Patient seen at bedside this morning and does not appear to be in marked distress but is shallow breathing and tachypneic.   ------------------------------------------------------------------------------------------------------------    PRESENT SYMPTOMS:     [X ] Unable to self-report      [X ] PAINADS     [X ] RDOS    [ ] No     [ ] Yes     Source if other than patient:  [ ]Family   [ ]Team     PAIN:   If blank, patient unable to specify     [ ]yes [x ]no    1. Location-   2. Radiation-    3. Quality-   4. Timing-   5. Minimal acceptable level/pain goal-   6. Aggravating factors-   7. QOL impact-     SYMPTOMS:   Dyspnea:                           [ ]Mild [ ]Moderate [x ]Severe  Anxiety:                             [ ]Mild [ ]Moderate [ ]Severe  Fatigue:                             [ ]Mild [ ]Moderate [ ]Severe  Nausea/Vomiting:              [ ]Mild [ ]Moderate [ ]Severe  Loss of appetite:                [ ]Mild [ ]Moderate [ ]Severe  Constipation:                     [ ]Mild [ ]Moderate [ ]Severe    Other Symptoms:  [ ]All other review of systems negative PATIENT UNABLE TO PARTAKE IN ROS    ------------------------------------------------------------------------------------------------------------  PHYSICAL EXAM:    PHYSICAL EXAM:  Vital Signs Last 24 Hrs  T(C): 37.8 (29 Feb 2024 20:00), Max: 37.8 (29 Feb 2024 20:00)  T(F): 100 (29 Feb 2024 20:00), Max: 100 (29 Feb 2024 20:00)  HR: 75 (29 Feb 2024 20:00) (75 - 109)  BP: 96/60 (29 Feb 2024 20:00) (96/60 - 96/60)  BP(mean): --  RR: 20 (29 Feb 2024 20:00) (20 - 20)  SpO2: 97% (01 Mar 2024 07:58) (92% - 99%)    Parameters below as of 29 Feb 2024 20:00  Patient On (Oxygen Delivery Method): BiPAP/CPAP     I&O's Summary    29 Feb 2024 07:01  -  01 Mar 2024 07:00  --------------------------------------------------------  IN: 0 mL / OUT: 100 mL / NET: -100 mL       GENERAL:  [ ]Cachexia  [x ] Frail  [ ]Awake  [ ]Oriented x   [ ]Lethargic  [X ]Unarousable  [ ]Verbal  [X ]Non-Verbal    BEHAVIORAL:   [ ] Anxiety  [ ] Delirium [ ] Agitation [ ] Other    HEENT:   [ ]Normal   [X ]Dry mouth   [ ]ET Tube/Trach  [ ]Oral lesions    PULMONARY:   [ ]Clear              [X ]Tachypnea  [ ]Audible excessive secretions   [ ]Rhonchi        [ ]Right [ ]Left [ ]Bilateral  [X ]Crackles        [ ]Right [ ]Left [X ]Bilateral  [ ]Wheezing     [ ]Right [ ]Left [ ]Bilateral  [x ]Diminished breath sounds [ ]right [x ]left [ ]bilateral L CHEST WALL TUBE DRAINING SEROSANGUINOUS FLUID    CARDIOVASCULAR:    [ ]Regular [x ]Irregular [x]Tachy  [ ]Abdirashid [ ]Murmur [ ]Other    GASTROINTESTINAL:  [x ]Soft  [ ]Distended   [ ]+BS  [ ]Non tender [ ]Tender  [ ]Other [ ]PEG [ ]OGT/ NGT      GENITOURINARY:  [ ]Normal [x ] Incontinent   [ ]Oliguria/Anuria   [ ]Aguiar    MUSCULOSKELETAL:   [ ]Normal   [x ]Weakness  [ ]Bed/Wheelchair bound [ ]Edema left leg edema > right    NEUROLOGIC:   [ ]No focal deficits  [X ]Cognitive impairment  [ ]Dysphagia [ ]Dysarthria [ ]Paresis [ ]Other     SKIN:   [X ]Normal  [ ]Rash  [ ]Other RN NOTE ON SKIN REVIEWED IN CHART  [ ]Pressure ulcer(s)       Present on admission [ ]y [ ]n    LABS:                        10.3   19.87 )-----------( 230      ( 29 Feb 2024 03:24 )             34.0   02-29    149<H>  |  103  |  132<H>  ----------------------------<  128<H>  5.5<H>   |  31  |  3.65<H>    Ca    8.8      29 Feb 2024 03:24  Phos  8.4     02-29  Mg     3.50     02-29    PTT - ( 29 Feb 2024 03:24 )  PTT:74.6 seC   ------------------------------------------------------------------------------------------------------------    CRITICAL CARE:  [ ]Shock Present  [ ]Septic [ ]Cardiogenic [ ]Neurologic [ ]Hypovolemic [ ] Undifferentiated/Mixed   [ ]Vasopressors [ ]Inotropes     [X ]Respiratory failure present: [ ]Acute  [ ]Chronic [ ]Hypoxic  [ ]Hypercarbic   [ ]Mechanical ventilation   [ ]Trach collar   [X ]Non-invasive ventilatory support   [ ]High flow    [ ]Non-rebreather/Venti     [x ]Other organ failure RENAL    ------------------------------------------------------------------------------------------------------------    RADIOLOGY & ADDITIONAL STUDIES:    NO NEW IMAGING SINCE 2/28, PRIORS REVIEWED    ------------------------------------------------------------------------------------------------------------    ALLERGIES:  Allergies    No Known Allergies    Intolerances    MEDICATIONS  (STANDING):  albuterol    90 MICROgram(s) HFA Inhaler 2 Puff(s) Inhalation every 6 hours  allopurinol 100 milliGRAM(s) Oral daily  aspirin enteric coated 81 milliGRAM(s) Oral daily  atorvastatin 40 milliGRAM(s) Oral at bedtime  budesonide 160 MICROgram(s)/formoterol 4.5 MICROgram(s) Inhaler 2 Puff(s) Inhalation two times a day  dexAMETHasone  Injectable 6 milliGRAM(s) IV Push daily  metoprolol succinate ER 50 milliGRAM(s) Oral daily  pantoprazole    Tablet 40 milliGRAM(s) Oral before breakfast  piperacillin/tazobactam IVPB.. 3.375 Gram(s) IV Intermittent every 12 hours  polyethylene glycol 3350 17 Gram(s) Oral two times a day  senna 2 Tablet(s) Oral at bedtime  sodium chloride 0.65% Nasal 1 Spray(s) Both Nostrils two times a day  sodium chloride 0.9% for Nebulization 3 milliLiter(s) Nebulizer every 6 hours  tiotropium 2.5 MICROgram(s) Inhaler 2 Puff(s) Inhalation daily    MEDICATIONS  (PRN):  acetaminophen     Tablet .. 650 milliGRAM(s) Oral every 6 hours PRN Temp greater or equal to 38C (100.4F), Mild Pain (1 - 3)         Indication for Palliative care- f/u [facility]     INTERVAL EVENTS:  Family reached decision to make patient comfort measures yesterday, however requested waiting for more family to arrive prior to dc'ing Bipap.  Family arrived overnight, agree to remove bipap mask this morning.  Patient received dilaudid 0.2 mg IV x1 in the last 24 hours.  Patient seen at bedside this morning and does not appear to be in marked distress but is shallow breathing and tachypneic.     ------------------------------------------------------------------------------------------------------------    PRESENT SYMPTOMS:     [X ] Unable to self-report      [X ] PAINADS     [X ] RDOS    [ ] No     [ ] Yes     Source if other than patient:  [ ]Family   [ ]Team     PAIN:   If blank, patient unable to specify     [ ]yes [x ]no    1. Location-   2. Radiation-    3. Quality-   4. Timing-   5. Minimal acceptable level/pain goal-   6. Aggravating factors-   7. QOL impact-     SYMPTOMS:   Dyspnea:                           [ ]Mild [ ]Moderate [x ]Severe  Anxiety:                             [ ]Mild [ ]Moderate [ ]Severe  Fatigue:                             [ ]Mild [ ]Moderate [ ]Severe  Nausea/Vomiting:              [ ]Mild [ ]Moderate [ ]Severe  Loss of appetite:                [ ]Mild [ ]Moderate [ ]Severe  Constipation:                     [ ]Mild [ ]Moderate [ ]Severe    Other Symptoms:  [ ]All other review of systems negative PATIENT UNABLE TO PARTAKE IN ROS    ------------------------------------------------------------------------------------------------------------  PHYSICAL EXAM:    PHYSICAL EXAM:  Vital Signs Last 24 Hrs  T(C): 37.8 (29 Feb 2024 20:00), Max: 37.8 (29 Feb 2024 20:00)  T(F): 100 (29 Feb 2024 20:00), Max: 100 (29 Feb 2024 20:00)  HR: 75 (29 Feb 2024 20:00) (75 - 109)  BP: 96/60 (29 Feb 2024 20:00) (96/60 - 96/60)  BP(mean): --  RR: 20 (29 Feb 2024 20:00) (20 - 20)  SpO2: 97% (01 Mar 2024 07:58) (92% - 99%)    Parameters below as of 29 Feb 2024 20:00  Patient On (Oxygen Delivery Method): BiPAP/CPAP     I&O's Summary    29 Feb 2024 07:01  -  01 Mar 2024 07:00  --------------------------------------------------------  IN: 0 mL / OUT: 100 mL / NET: -100 mL       GENERAL:  [ ]Cachexia  [x ] Frail  [ ]Awake  [ ]Oriented x   [ ]Lethargic  [X ]Unarousable  [ ]Verbal  [X ]Non-Verbal    BEHAVIORAL:   [ ] Anxiety  [ ] Delirium [ ] Agitation [ ] Other    HEENT:   [ ]Normal   [X ]Dry mouth   [ ]ET Tube/Trach  [ ]Oral lesions    PULMONARY:   [ ]Clear              [X ]Tachypnea  [ ]Audible excessive secretions   [ ]Rhonchi        [ ]Right [ ]Left [ ]Bilateral  [X ]Crackles        [ ]Right [ ]Left [X ]Bilateral  [ ]Wheezing     [ ]Right [ ]Left [ ]Bilateral  [x ]Diminished breath sounds [ ]right [x ]left [ ]bilateral L CHEST WALL TUBE DRAINING SEROSANGUINOUS FLUID    CARDIOVASCULAR:    [ ]Regular [x ]Irregular [x]Tachy  [ ]Abdirashid [ ]Murmur [ ]Other    GASTROINTESTINAL:  [x ]Soft  [ ]Distended   [ ]+BS  [ ]Non tender [ ]Tender  [ ]Other [ ]PEG [ ]OGT/ NGT      GENITOURINARY:  [ ]Normal [x ] Incontinent   [ ]Oliguria/Anuria   [ ]Aguiar    MUSCULOSKELETAL:   [ ]Normal   [x ]Weakness  [ ]Bed/Wheelchair bound [ ]Edema left leg edema > right    NEUROLOGIC:   [ ]No focal deficits  [X ]Cognitive impairment  [ ]Dysphagia [ ]Dysarthria [ ]Paresis [ ]Other     SKIN:   [X ]Normal  [ ]Rash  [ ]Other RN NOTE ON SKIN REVIEWED IN CHART  [ ]Pressure ulcer(s)       Present on admission [ ]y [ ]n    ------------------------------------------------------------------------------------------------------------    LABS:                        10.3   19.87 )-----------( 230      ( 29 Feb 2024 03:24 )             34.0   02-29    149<H>  |  103  |  132<H>  ----------------------------<  128<H>  5.5<H>   |  31  |  3.65<H>    Ca    8.8      29 Feb 2024 03:24  Phos  8.4     02-29  Mg     3.50     02-29    PTT - ( 29 Feb 2024 03:24 )  PTT:74.6 seC     ------------------------------------------------------------------------------------------------------------    CRITICAL CARE:  [ ]Shock Present  [ ]Septic [ ]Cardiogenic [ ]Neurologic [ ]Hypovolemic [ ] Undifferentiated/Mixed   [ ]Vasopressors [ ]Inotropes     [X ]Respiratory failure present: [ ]Acute  [ ]Chronic [ ]Hypoxic  [ ]Hypercarbic   [ ]Mechanical ventilation   [ ]Trach collar   [X ]Non-invasive ventilatory support   [ ]High flow    [ ]Non-rebreather/Venti     [x ]Other organ failure RENAL    ------------------------------------------------------------------------------------------------------------    RADIOLOGY & ADDITIONAL STUDIES:    NO NEW IMAGING SINCE 2/28, PRIORS REVIEWED    ------------------------------------------------------------------------------------------------------------    ALLERGIES:  Allergies    No Known Allergies    Intolerances    MEDICATIONS  (STANDING):  albuterol    90 MICROgram(s) HFA Inhaler 2 Puff(s) Inhalation every 6 hours  allopurinol 100 milliGRAM(s) Oral daily  aspirin enteric coated 81 milliGRAM(s) Oral daily  atorvastatin 40 milliGRAM(s) Oral at bedtime  budesonide 160 MICROgram(s)/formoterol 4.5 MICROgram(s) Inhaler 2 Puff(s) Inhalation two times a day  dexAMETHasone  Injectable 6 milliGRAM(s) IV Push daily  metoprolol succinate ER 50 milliGRAM(s) Oral daily  pantoprazole    Tablet 40 milliGRAM(s) Oral before breakfast  piperacillin/tazobactam IVPB.. 3.375 Gram(s) IV Intermittent every 12 hours  polyethylene glycol 3350 17 Gram(s) Oral two times a day  senna 2 Tablet(s) Oral at bedtime  sodium chloride 0.65% Nasal 1 Spray(s) Both Nostrils two times a day  sodium chloride 0.9% for Nebulization 3 milliLiter(s) Nebulizer every 6 hours  tiotropium 2.5 MICROgram(s) Inhaler 2 Puff(s) Inhalation daily    MEDICATIONS  (PRN):  acetaminophen     Tablet .. 650 milliGRAM(s) Oral every 6 hours PRN Temp greater or equal to 38C (100.4F), Mild Pain (1 - 3)

## 2024-03-01 NOTE — PROGRESS NOTE ADULT - SUBJECTIVE AND OBJECTIVE BOX
[facility]  Cornelia Downing DO  Available on MS Teams  Pager: [phone]    Patient is a 89y old  Male who presents with a chief complaint of Transfer for PleurX (26 Feb 2024 14:06)    Subjective:  Pt seen and examined at bedside on BIPAP. Non-participatory with exam.   Son-in-law at bedside (Frida's wife), states Frida does not want to see pt pass and likely will not come to the hospital.   Lora and family en route; granddaughter was able to see pt yesterday night.  Agree to remove BIPAP today.    Vital Signs Last 24 Hrs  T(C): 37 (01 Mar 2024 08:00), Max: 37.8 (29 Feb 2024 20:00)  T(F): 98.6 (01 Mar 2024 08:00), Max: 100 (29 Feb 2024 20:00)  HR: 72 (01 Mar 2024 08:00) (72 - 109)  BP: 102/74 (01 Mar 2024 08:00) (96/60 - 102/74)  BP(mean): --  RR: 20 (29 Feb 2024 20:00) (20 - 20)  SpO2: 97% (01 Mar 2024 08:00) (92% - 99%)    Parameters below as of 29 Feb 2024 20:00  Patient On (Oxygen Delivery Method): BiPAP/CPAP    PHYSICAL EXAM:  Constitutional: lethargic, non-participatory   Head: NC/AT  ENT: no nasal discharge; dry MM  Neck: supple; no JVD  Respiratory: tachypneic, poor inspiratory effort, shallow breathing, agonal breathing  Cardiac: +S1/S2; RRR; no M/R/G  Gastrointestinal: soft, NT/ND; no rebound or guarding   Extremities: WWP, no clubbing or cyanosis; no peripheral edema  Musculoskeletal: non-participatory with exam  Neurologic: AAOx0; non-participatory     MEDICATIONS  (STANDING):    MEDICATIONS  (PRN):  glycopyrrolate Injectable 0.4 milliGRAM(s) IV Push four times a day PRN Secretions  HYDROmorphone  Injectable 0.5 milliGRAM(s) IV Push every 1 hour PRN dyspnea    Labs:  defer 2/2 comfort measures          [facility]  Cornelia Downing DO  Available on MS Teams  Pager: [phone]    Patient is a 89y old  Male who presents with a chief complaint of Transfer for PleurX (26 Feb 2024 14:06)    Subjective:  Pt seen and examined at bedside on BIPAP. Non-participatory with exam.   Son-in-law at bedside (Frida's ), states Frida does not want to see pt pass and likely will not come to the hospital.   Lora and family en route; granddaughter was able to see pt yesterday night.  Agree to remove BIPAP today.    Vital Signs Last 24 Hrs  T(C): 37 (01 Mar 2024 08:00), Max: 37.8 (29 Feb 2024 20:00)  T(F): 98.6 (01 Mar 2024 08:00), Max: 100 (29 Feb 2024 20:00)  HR: 72 (01 Mar 2024 08:00) (72 - 109)  BP: 102/74 (01 Mar 2024 08:00) (96/60 - 102/74)  BP(mean): --  RR: 20 (29 Feb 2024 20:00) (20 - 20)  SpO2: 97% (01 Mar 2024 08:00) (92% - 99%)    Parameters below as of 29 Feb 2024 20:00  Patient On (Oxygen Delivery Method): BiPAP/CPAP    PHYSICAL EXAM:  Constitutional: lethargic, non-participatory   Head: NC/AT  ENT: no nasal discharge; dry MM  Neck: supple; no JVD  Respiratory: tachypneic, poor inspiratory effort, shallow breathing, agonal breathing  Cardiac: +S1/S2; RRR; no M/R/G  Gastrointestinal: soft, NT/ND; no rebound or guarding   Extremities: WWP, no clubbing or cyanosis; no peripheral edema  Musculoskeletal: non-participatory with exam  Neurologic: AAOx0; non-participatory     MEDICATIONS  (STANDING):    MEDICATIONS  (PRN):  glycopyrrolate Injectable 0.4 milliGRAM(s) IV Push four times a day PRN Secretions  HYDROmorphone  Injectable 0.5 milliGRAM(s) IV Push every 1 hour PRN dyspnea    Labs:  defer 2/2 comfort measures

## 2024-03-01 NOTE — PROGRESS NOTE ADULT - PROBLEM SELECTOR PROBLEM 5
2019 novel coronavirus disease (COVID-19)
Breast mass in male
COPD, severity to be determined
2019 novel coronavirus disease (COVID-19)
Congestive heart failure
Dysphagia
2019 novel coronavirus disease (COVID-19)
COPD, severity to be determined
COPD, severity to be determined
Breast mass in male
2019 novel coronavirus disease (COVID-19)
2019 novel coronavirus disease (COVID-19)
COPD, severity to be determined
2019 novel coronavirus disease (COVID-19)
Acute kidney injury superimposed on CKD

## 2024-03-01 NOTE — PROGRESS NOTE ADULT - PROBLEM SELECTOR PLAN 4
For GOC- Family aware patient is dying, DNR/DNI/comfort care. They want to continue bipap, waiting for patient's granddaughter flies in from [place] tonight (arriving around  PM tentatively). For GOC- Patient actively dying today on bipap. Family agreed to bipap removal this morning. Patient  in afternoon.

## 2024-03-01 NOTE — PROGRESS NOTE ADULT - PROBLEM SELECTOR PROBLEM 3
2019 novel coronavirus disease (COVID-19)
Advance care planning
ACP (advance care planning)
Pleural effusion
Advance care planning
Congestive heart failure
Advance care planning
Congestive heart failure
Advance care planning
Palliative care encounter
Pleural effusion
ACP (advance care planning)
Advance care planning
Advance care planning

## 2024-03-01 NOTE — PROGRESS NOTE ADULT - PROBLEM SELECTOR PROBLEM 7
Stage 3 chronic kidney disease
Dysphagia
Stage 3 chronic kidney disease
Dysphagia
COPD, severity to be determined
Dysphagia
Dysphagia
Stage 3 chronic kidney disease
Atrial fibrillation
Stage 3 chronic kidney disease
Dysphagia
Breast mass in male
Atrial fibrillation
Dysphagia
Congestive heart failure

## 2024-03-01 NOTE — PROGRESS NOTE ADULT - PROBLEM SELECTOR PLAN 10
DVT: Heparin gtt   Diet: Minced and Moist with Moderately Thick Liquids  Dispo: pending clinical progress, PT rec home PT
DVT: Heparin gtt -hold for procedure  Diet: Minced and Moist with Moderately Thick Liquids  Dispo: pending clinical progress, PT changed rec to [place]
- High risk of COPD contributing to acute hypoxic respiratory failure due to smoking history   - Casey q6h PRN   - C/w Symbicort and Spiriva
- Hx of AFib   - Monitor on telemetry (telemetry with AFib to 100s 2/19)  - C/w metoprolol succinate 50 mg qd   - was on Heparin gtt for full anticoagulation-hold for procedure  - Keep Mg > 2 and K+ > 4
- High risk of COPD contributing to acute hypoxic respiratory failure due to smoking history   -dc inhalers for comfort
- High risk of COPD contributing to acute hypoxic respiratory failure due to smoking history   -dc inhalers for comfort

## 2024-03-01 NOTE — PROGRESS NOTE ADULT - PROBLEM SELECTOR PLAN 9
DVT: Heparin gtt   Diet: S/p SLP evaluation - NPO for now - pending MBS  Dispo: pending clinical progress, PT rec home PT
DVT: Heparin gtt   Diet: S/p SLP evaluation - NPO for now - pending MBS  Dispo: pending clinical progress, PT rec home PT
- Baseline Cr 1.5-1.7  - Cr stable in the 1.3-1.5 range  - Continue to monitor  - Avoid nephrotoxic medications  - Renally dose all medications
- High risk of COPD contributing to acute hypoxic respiratory failure due to smoking history   - Casey q6h PRN   - C/w Symbicort and Spiriva
- CT chest 2/8 demonstrating 2.5 x 1.4 cm right retroareolar breast nodule increased in size since 9/2023 assessment
- CT chest 2/8 demonstrating 2.5 x 1.4 cm right retroareolar breast nodule increased in size since 9/2023 assessment  - Surgery consulted at OSH, recommended outpatient follow-up
- Hx of AFib   - Monitor on telemetry (telemetry with AFib to 100s 2/19)  - C/w metoprolol succinate 50 mg qd   - was on Heparin gtt for full anticoagulation-hold for procedure  - Keep Mg > 2 and K+ > 4
- CT chest 2/8 demonstrating 2.5 x 1.4 cm right retroareolar breast nodule increased in size since 9/2023 assessment  - Surgery consulted at OSH, recommended outpatient follow-up
- CT chest 2/8 demonstrating 2.5 x 1.4 cm right retroareolar breast nodule increased in size since 9/2023 assessment

## 2024-03-01 NOTE — PROGRESS NOTE ADULT - PROBLEM SELECTOR PROBLEM 9
Atrial fibrillation
Breast mass in male
COPD, severity to be determined
Breast mass in male
Stage 3 chronic kidney disease
Breast mass in male
Need for prophylactic measure
Need for prophylactic measure
Breast mass in male

## 2024-03-01 NOTE — PROGRESS NOTE ADULT - PROBLEM SELECTOR PROBLEM 10
Atrial fibrillation
COPD, severity to be determined
Need for prophylactic measure
Need for prophylactic measure
COPD, severity to be determined

## 2024-03-01 NOTE — PROGRESS NOTE ADULT - PROBLEM SELECTOR PLAN 1
- Bipap stopped at 11:30 am 3/1 per family GOC to make patient comfort care, premedicated with dilaudid prior to removal and transition to nasal cannula  - Patient received IV dilaudid 0.2 mg x 1 dose overnight, per RN patient looked more comfortable. - Patient received IV dilaudid 0.2 mg x 1 dose overnight, per RN patient looked more comfortable.  - Bipap stopped at 11:30 am 3/1 per family GOC to make patient comfort care, premedicated with IV dilaudid 0.5 mg prior to removal and transition to nasal cannula

## 2024-03-01 NOTE — PROGRESS NOTE ADULT - PROBLEM SELECTOR PLAN 2
General: - Stop all labs, remove telemonitor  - No further fluids, antibiotics, treatments, work ups  - Nasal cannula for comfort  - C/w IV dilaudid 0.5 mg q1h PRN for dyspnea, will consider infusion as needed  - Robinul 0.4 mg q6h for excessive secretions

## 2024-03-01 NOTE — PROGRESS NOTE ADULT - PROBLEM SELECTOR PLAN 3
Family meeting held 2/28 with palliative team -- all in agreement for DNR/DNI status as pt unlikely to have meaningful recovery in the event of cardiac arrest/respiratory arrest, and would not want to cause/prolong suffering or need to make a decision to discontinue life support. All seem to understand pt poor prognosis with goal of keeping him alive long enough for [facility] to visit. Defer blood draws, tele monitoring, , IVF, artificial nutrition.  - MOLST updated  - DNR/DNI, comfort measures only

## 2024-03-01 NOTE — CHART NOTE - NSCHARTNOTEFT_GEN_A_CORE
Patient unresponsive to verbal/noxious stimuli.  Pupils fixed and dilated. No spontaneous breathing. No palpable carotid/radial pulse. No heart sounds. No breath sounds.  Time of Death: 2:48 ON   Attending notified.  Family notified.

## 2024-03-01 NOTE — PROGRESS NOTE ADULT - TIME BILLING
review of the medical record, interpretation of labs, imaging studies, discussion with interdisciplinary team, physical exam and medical decision making as above
Reviewing the EMR, vitals, imaging, medication list, recent labs, prior records and coordinating care with medical providers. This time excludes procedures and teaching.
review of the medical record, interpretation of labs, imaging studies, discussion with interdisciplinary team, physical exam and medical decision making as above
review of laboratory data, radiology results, consultants' recommendations, documentation in [place], discussion with patient/ACP and interdisciplinary staff (such as , social workers, etc). Interventions were performed as documented above.
Time-based billing (NON-critical care).     60 minutes spent on total encounter; more than 50% of the visit was spent counseling and / or coordinating care by the attending physician.  The necessity of the time spent during the encounter on this date of service was due to:     documentation in [place], reviewing chart and coordinating care with patient/ACP and interdisciplinary staff (such as , social workers, etc) as well as reviewing vitals, laboratory data, radiology, medication list, consultants' recommendations and prior records. Interventions were performed as documented above.
Time-based billing (NON-critical care).     50 minutes spent on total encounter; more than 50% of the visit was spent counseling and / or coordinating care by the attending physician.  The necessity of the time spent during the encounter on this date of service was due to:     documentation in [place], reviewing chart and coordinating care with patient/ACP and interdisciplinary staff (such as , social workers, etc) as well as reviewing vitals, laboratory data, radiology, medication list, consultants' recommendations and prior records. Interventions were performed as documented above.
review of laboratory data, radiology results, consultants' recommendations, documentation in [place], discussion with patient/ACP and interdisciplinary staff (such as , social workers, etc). Interventions were performed as documented above.
Time-based billing (NON-critical care).     60 minutes spent on total encounter; more than 50% of the visit was spent counseling and / or coordinating care by the attending physician.  The necessity of the time spent during the encounter on this date of service was due to:     documentation in [place], reviewing chart and coordinating care with patient/ACP and interdisciplinary staff (such as , social workers, etc) as well as reviewing vitals, laboratory data, radiology, medication list, consultants' recommendations and prior records. Interventions were performed as documented above.
Time-based billing (NON-critical care).     60 minutes spent on total encounter; more than 50% of the visit was spent counseling and / or coordinating care by the attending physician.  The necessity of the time spent during the encounter on this date of service was due to:     documentation in [place], reviewing chart and coordinating care with patient/ACP and interdisciplinary staff (such as , social workers, etc) as well as reviewing vitals, laboratory data, radiology, medication list, consultants' recommendations and prior records. Interventions were performed as documented above.
review of laboratory data, radiology results, consultants' recommendations, documentation in [place], discussion with patient/ACP and interdisciplinary staff (such as , social workers, etc). Interventions were performed as documented above.
review of laboratory data, radiology results, consultants' recommendations, documentation in [place], discussion with patient/ACP and interdisciplinary staff (such as , social workers, etc). Interventions were performed as documented above.
Reviewing medical chart and results, symptom assessment and management, counseling patient/decision makers/caregivers, coordination of care, documentation.
review of laboratory data, radiology results, consultants' recommendations, documentation in [place], discussion with patient/ACP and interdisciplinary staff (such as , social workers, etc). Interventions were performed as documented above.

## 2024-03-01 NOTE — PROGRESS NOTE ADULT - PROBLEM SELECTOR PLAN 6
- High risk of COPD contributing to acute hypoxic respiratory failure due to smoking history   - Casey q6h PRN   - C/w Symbicort and Spiriva
Hx of A. fib   - Monitor on telemetry   - C/w metoprolol succinate 50 mg qd   - C/w Heparin gtt for full anticoagulation   - Keep Mg > 2 and K+ > 4
Hx of A. fib   - Monitor on telemetry   - C/w metoprolol succinate 50 mg qd   - C/w Heparin gtt for full anticoagulation   - Keep Mg > 2 and K+ > 4
s/p MBS 2/21  -Minced and Moist with Moderately Thick Liquids per speech  -aspiration precautions
creat 2.1 from Baseline Cr 1.5-1.7  -likely pre-renal, over diuresis, poor po intake  -will give 500cc today  -trend renal function
- Hx of AFib   - Monitor on telemetry (telemetry with AFib to 100s 2/19)  - C/w metoprolol succinate 50 mg qd   - C/w Heparin gtt for full anticoagulation   - Keep Mg > 2 and K+ > 4
- High risk of COPD contributing to acute hypoxic respiratory failure due to smoking history   - Casey q6h PRN   - C/w Symbicort and Spiriva
creat 2.1 from Baseline Cr 1.5-1.7  -likely pre-renal, over diuresis, poor po intake  -renal consulted, appreciate recs  - 2/26 IVF trial  - pending renal US, LESTER dopplers  - caution given hx CHF  -trend renal function
creat 2.1 from Baseline Cr 1.5-1.7  -likely pre-renal, over diuresis, poor po intake  -renal consulted, appreciate recs  - 2/26 IVF trial  - RRT 2/28 for hypoxia; CXR with slightly increased congestion --> given lasix 40mg IV  -comfort measures
Hx of A. fib   - Monitor on telemetry   - C/w metoprolol succinate 50 mg qd   - C/w Heparin gtt for full anticoagulation   - Keep Mg > 2 and K+ > 4
- CT chest 2/8 demonstrating 2.5 x 1.4 cm right retroareolar breast nodule increased in size since 9/2023 assessment  - Surgery consulted at OSH, recommended outpatient follow-up
- TTE 2/7 demonstrating EF 55-60%, moderate pulmonary hypertension, moderate mitral stenosis, severely enlarged b/l atria   - C/w Lasix 40 IV daily- hold due to IRINEO- monitor renal function, resume when able  - Monitor I/Os
creat 2.1 from Baseline Cr 1.5-1.7  -likely pre-renal, over diuresis, poor po intake  -renal consulted, appreciate recs  - 2/26 IVF trial  - RRT 2/28 for hypoxia; CXR with slightly increased congestion --> given lasix 40mg IV  -comfort measures
creat 2.1 from Baseline Cr 1.5-1.7  -likely pre-renal, over diuresis, poor po intake  -renal consulted, appreciate recs  - 2/26 IVF trial  - pending renal US, LESTER orellanas  - RRT 2/28 for hypoxia; CXR with slightly increased congestion --> given lasix 40mg IV  -trend renal function
creat 2.1 from Baseline Cr 1.5-1.7  -likely pre-renal, over diuresis, poor po intake  -will consult renal --> IVF trial LR 100cc/h x12h  - caution given hx CHF  -trend renal function

## 2024-03-01 NOTE — PROGRESS NOTE ADULT - ASSESSMENT
Pt is a 89 yo M with PMH CVA, a-fib (eliquis), CHF (EF 45-50%), HTN, HLD, COPD, and CKD3 p/w SOB. Found to have L pleural effusion and COVID s/p remdesivir. Course c/b worsening hypoxia, started on zosyn and steroids, now on HFNC with pulm and CT Sx following s/p PTC 2/23 -- likely no plan for further intervention. On heparin for a-fib with intermittent bleeding (chest tube site, skin lac) briefly held, now with pt specific PTT 40-60 goal. GOC meeting held with pt's daughters 2/28, palliative following, now DNR/DNI and comfort measures due to worsening hypoxia/hypercarbia and need for BIPAP. Plan for removal of BIPAP today. Started on dilaudid/glycopyrrolate.

## 2024-03-01 NOTE — PROGRESS NOTE ADULT - NUTRITIONAL ASSESSMENT
This patient has been assessed with a concern for Malnutrition and has been determined to have a diagnosis/diagnoses of Severe protein-calorie malnutrition.    This patient is being managed with:   Diet DASH/TLC-  Sodium & Cholesterol Restricted  Minced and Moist (KASHIFMOIST)  Entered: Feb 16 2024  5:05AM  
This patient has been assessed with a concern for Malnutrition and has been determined to have a diagnosis/diagnoses of Severe protein-calorie malnutrition.    This patient is being managed with:   Diet Minced and Moist-  DASH/TLC {Sodium & Cholesterol Restricted} (DASH)  Moderately Thick Liquids (MODTHICKLIQS)  Entered: Feb 21 2024  3:26PM  
This patient has been assessed with a concern for Malnutrition and has been determined to have a diagnosis/diagnoses of Severe protein-calorie malnutrition.    This patient is being managed with:   Diet NPO-  Entered: Feb 19 2024  8:31AM  
This patient has been assessed with a concern for Malnutrition and has been determined to have a diagnosis/diagnoses of Severe protein-calorie malnutrition.    This patient is being managed with:   Diet Minced and Moist-  DASH/TLC {Sodium & Cholesterol Restricted} (DASH)  Moderately Thick Liquids (MODTHICKLIQS)  Entered: Feb 21 2024  3:26PM  
This patient has been assessed with a concern for Malnutrition and has been determined to have a diagnosis/diagnoses of Severe protein-calorie malnutrition.    This patient is being managed with:   Diet Minced and Moist-  DASH/TLC {Sodium & Cholesterol Restricted} (DASH)  Moderately Thick Liquids (MODTHICKLIQS)  Entered: Feb 21 2024  3:26PM  
This patient has been assessed with a concern for Malnutrition and has been determined to have a diagnosis/diagnoses of Severe protein-calorie malnutrition.  
This patient has been assessed with a concern for Malnutrition and has been determined to have a diagnosis/diagnoses of Severe protein-calorie malnutrition.    This patient is being managed with:   Diet DASH/TLC-  Sodium & Cholesterol Restricted  Minced and Moist (KASHIFMOIST)  Entered: Feb 16 2024  5:05AM  
This patient has been assessed with a concern for Malnutrition and has been determined to have a diagnosis/diagnoses of Severe protein-calorie malnutrition.    This patient is being managed with:   Diet NPO-  Except Medications  With Ice Chips/Sips of Water  Entered: Feb 20 2024  9:56AM  
This patient has been assessed with a concern for Malnutrition and has been determined to have a diagnosis/diagnoses of Severe protein-calorie malnutrition.    This patient is being managed with:   Diet NPO-  Except Medications  With Ice Chips/Sips of Water  Entered: Feb 20 2024  9:56AM  
This patient has been assessed with a concern for Malnutrition and has been determined to have a diagnosis/diagnoses of Severe protein-calorie malnutrition.    This patient is being managed with:   Diet NPO-  Entered: Feb 29 2024  2:48PM  
This patient has been assessed with a concern for Malnutrition and has been determined to have a diagnosis/diagnoses of Severe protein-calorie malnutrition.    This patient is being managed with:   Diet DASH/TLC-  Sodium & Cholesterol Restricted  Minced and Moist (KASHIFMOIST)  Entered: Feb 16 2024  5:05AM  
This patient has been assessed with a concern for Malnutrition and has been determined to have a diagnosis/diagnoses of Severe protein-calorie malnutrition.    This patient is being managed with:   Diet Minced and Moist-  DASH/TLC {Sodium & Cholesterol Restricted} (DASH)  Moderately Thick Liquids (MODTHICKLIQS)  Entered: Feb 21 2024  3:26PM  
This patient has been assessed with a concern for Malnutrition and has been determined to have a diagnosis/diagnoses of Severe protein-calorie malnutrition.    This patient is being managed with:   Diet NPO-  Entered: Feb 29 2024  2:48PM

## 2024-03-01 NOTE — PROGRESS NOTE ADULT - PROBLEM SELECTOR PROBLEM 8
Atrial fibrillation
Congestive heart failure
Congestive heart failure
Need for prophylactic measure
Stage 3 chronic kidney disease
Need for prophylactic measure
Congestive heart failure
COPD, severity to be determined
Breast mass in male
Congestive heart failure
Congestive heart failure
Stage 3 chronic kidney disease
Congestive heart failure

## 2024-03-01 NOTE — PROGRESS NOTE ADULT - PROBLEM SELECTOR PLAN 12
DVT: Heparin gtt   Diet: Minced and Moist with Moderately Thick Liquids  Dispo: pending clinical progress, PT changed rec to [place]; guarded prognosis  DNR/DNI
DVT: none  Diet: NPO +/- pleasure feeds    DNR/DNI, comfort measures only
DVT: Heparin gtt   Diet: Minced and Moist with Moderately Thick Liquids  Dispo: pending clinical progress, PT changed rec to [place]
DVT: none  Diet: NPO +/- pleasure feeds    DNR/DNI, comfort measures only
DVT: Heparin gtt   Diet: Minced and Moist with Moderately Thick Liquids  Dispo: pending clinical progress, PT changed rec to [place]
DVT: Heparin gtt resume  Diet: Minced and Moist with Moderately Thick Liquids  Dispo: pending clinical progress, PT changed rec to [place]

## 2024-03-01 NOTE — PROGRESS NOTE ADULT - PROBLEM SELECTOR PLAN 11
DVT: Heparin gtt -hold for procedure  Diet: Minced and Moist with Moderately Thick Liquids  Dispo: pending clinical progress, PT changed rec to [place]
- Hx of AFib   - Monitor on telemetry (telemetry with AFib to 100s 2/19)  - C/w metoprolol succinate 50 mg qd   - c/w Heparin gtt as above, PTT goal 40-60  - Keep Mg > 2 and K+ > 4
- Hx of AFib   -dc tele and heparin gtt for comfort measures  -PRN 5mg IV lopressor HR>110
- Hx of AFib   - Monitor on telemetry (telemetry with AFib to 100s 2/19)  - C/w metoprolol succinate 50 mg qd   - was on Heparin gtt for full anticoagulation-resume  - Keep Mg > 2 and K+ > 4
- Hx of AFib   - Monitor on telemetry (telemetry with AFib to 100s 2/19)  - C/w metoprolol succinate 50 mg qd   - c/w Heparin gtt as above, PTT goal 40-60  - Keep Mg > 2 and K+ > 4
- Hx of AFib   -dc tele and heparin gtt for comfort measures  -PRN 5mg IV lopressor HR>110
- Hx of AFib   - Monitor on telemetry (telemetry with AFib to 100s 2/19)  - C/w metoprolol succinate 50 mg qd   - c/w Heparin gtt as above, PTT goal 40-60  - Keep Mg > 2 and K+ > 4

## 2024-03-09 LAB
CULTURE RESULTS: SIGNIFICANT CHANGE UP
SPECIMEN SOURCE: SIGNIFICANT CHANGE UP

## 2024-03-09 NOTE — PROGRESS NOTE ADULT - ASSESSMENT
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), Moderate mitral valve stenosis, Hypertension, Chronic kidney disease, COPD and history of CVA with recurrent hospitalizations for congestive heart failure likely from medication noncompliance and recent hospitalization at [facility] last month for congestive heart failure exacerbation deemed not to require chest tube placement at the time, also with moderate to severe PAD - deemed to be poor surgical candidate for lower extremity vascular intervention, now presents with progressive dyspnea, found to have acute on chronic diastolic heart failure exacerbation with large left pleural effusion.    ECG consisent with atrial fibrillation. Troponins negative x 2, does not appear to be an acute coronary syndrome. 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 The patient is now s/p thoracentesis with 1 L fluid removed.    Recommendations:  [] Acute on chronic diastolic heart failure exacerbation with chronic pleural effusions: S/p thoracentesis, patient awaiting bed availability at [facility] for Pleurx catheter. Continue IV diuresis. Continue Metoprolol succinate 50 mg daily. Plan to resume SGLT2-I when euvolemic. Follow up fluid studies and Pulmonary.  [] Moderate mitral valve stenosis: The patient would appear to be a poor candidate for MV PMBC or surgical repair at this age and also due to comorbidities, can obtain structural heart team evaluation while at [facility], likely outpatient management.   [] Atrial fibrillation: Rate controlled, continue beta blocker. Given moderate mitral stenosis the patient would likely benefit from coumadin instead, to be discussed with patient and family. Patient currently on heparin drip     Patient awaiting bed availability at [facility] for Pleurx catheter. Will sign out this case to cardiologist to follow along tomorrow.     Kevin Hong MD  Cardiology         eliquis for dvt ppx  nicotine patch for tobacco use

## 2024-03-13 NOTE — ED ADULT NURSE NOTE - FINAL NURSING ELECTRONIC SIGNATURE
Diagnosed in December,   Initially started on steroids, prednisone 60 mg daily, which patient had tapered to 30 mg daily prior to admission with the addition of atovaquone, hydroxychloroquine, and mycophenolate    Holding atovaquone, hydroxychloroquine, and mycophenolate in the setting of acute infection    Continue prednisone, had planned to taper 10 mg weekly, currently 40 mg Day 7, will hold off further dose reduction at this time in the setting of critical illness with concern for possible relative adrenal insufficiency, though random cortisol of 10.9 collected 3/11 is reassuring    CRP elevated at 39.9, ESR wnl <1, C3 and C4 both low. Though non-specific, c/w lupus.   18-Dec-2023 15:57

## 2024-03-15 NOTE — ED ADULT TRIAGE NOTE - BP NONINVASIVE DIASTOLIC (MM HG)

## 2024-03-23 LAB
CULTURE RESULTS: SIGNIFICANT CHANGE UP
SPECIMEN SOURCE: SIGNIFICANT CHANGE UP

## 2024-03-25 ENCOUNTER — APPOINTMENT (OUTPATIENT)
Dept: PULMONOLOGY | Facility: CLINIC | Age: 89
End: 2024-03-25

## 2024-05-13 NOTE — DISCHARGE NOTE PROVIDER - NSDCCPCAREPLAN_GEN_ALL_CORE_FT
lmtcb   PRINCIPAL DISCHARGE DIAGNOSIS  Diagnosis: Closed displaced intertrochanteric fracture of right femur, initial encounter  Assessment and Plan of Treatment:

## 2024-05-14 NOTE — PRE-ANESTHESIA EVALUATION ADULT - RESPIRATORY RATE (BREATHS/MIN)
Outreach attempt was made to schedule a Medicare Wellness Visit. This was the first attempt. Contact was not made, left message. Letter sent via patient portal  
18

## 2024-05-19 NOTE — ED PROVIDER NOTE - SKIN COLOR
42 y/o M hx of cyclic vomiting, DM, HTN, CAD, gastroparesis presents to abdominal pain for abdominal pain. endorsing middle of abdomen, for past 1 day. endorsing nausea , non-bloody/non-bilious emesis multiple episodes today. last marijuana use was 3 weeks ago per patient. denies changes in bowel movements. denies chest pain/sob. denies fever/chills. see above

## 2024-06-03 NOTE — PROGRESS NOTE ADULT - SUBJECTIVE AND OBJECTIVE BOX
Subjective: pt seen lying in bed looking distressed on HFNC 60%, although his Sat 97%, he seems much more confused and uncomfortable than he was a few days ago    Vital Signs:  Vital Signs Last 24 Hrs  T(C): 36.5 (02-25-24 @ 05:38), Max: 36.7 (02-24-24 @ 13:45)  T(F): 97.7 (02-25-24 @ 05:38), Max: 98.1 (02-24-24 @ 20:28)  HR: 86 (02-25-24 @ 05:38) (80 - 92)  BP: 125/79 (02-25-24 @ 05:38) (125/79 - 139/66)  RR: 18 (02-25-24 @ 07:21) (18 - 20)  SpO2: 97% (02-25-24 @ 07:21) (95% - 100%) on (O2)    Telemetry/Alarms:  General: WN/WD NAD  Neurology: Awake, difficult to assess A&O status as pt not verbalizing, using hand motions, truying to get out of bed  Respiratory: dec BS B/L  CV: afib  Abdominal: Soft,     Tubes: L PTC in place, to WS, no AL, drained 190cc SS  Relevant labs, radiology and Medications reviewed                        10.4   11.83 )-----------( 204      ( 25 Feb 2024 05:45 )             33.8     02-25    145  |  98  |  106<H>  ----------------------------<  121<H>  4.3   |  32<H>  |  2.45<H>    Ca    9.0      25 Feb 2024 05:45  Phos  5.4     02-25  Mg     2.90     02-25      < from: Xray Chest 1 View- PORTABLE-Urgent (Xray Chest 1 View- PORTABLE-Urgent .) (02.24.24 @ 06:54) >  IMPRESSION:  Interval placement of left chest tube with improved aeration of the left   lung and decreased left pleural effusion.    There has been development of a small left apical pneumothorax which is   stable in size over multiple films.    Bilateral pulmonary opacities.    Small right pleural effusion.      < end of copied text >      MEDICATIONS  (STANDING):  acetaminophen   IVPB .. 1000 milliGRAM(s) IV Intermittent once  albuterol    90 MICROgram(s) HFA Inhaler 2 Puff(s) Inhalation every 6 hours  allopurinol 100 milliGRAM(s) Oral daily  aspirin enteric coated 81 milliGRAM(s) Oral daily  atorvastatin 40 milliGRAM(s) Oral at bedtime  budesonide 160 MICROgram(s)/formoterol 4.5 MICROgram(s) Inhaler 2 Puff(s) Inhalation two times a day  dexAMETHasone  Injectable 6 milliGRAM(s) IV Push daily  guaiFENesin Oral Liquid (Sugar-Free) 100 milliGRAM(s) Oral every 6 hours  ipratropium 17 MICROgram(s) HFA Inhaler 1 Puff(s) Inhalation every 6 hours  lactated ringers. 500 milliLiter(s) (100 mL/Hr) IV Continuous <Continuous>  metoprolol succinate ER 50 milliGRAM(s) Oral daily  pantoprazole    Tablet 40 milliGRAM(s) Oral before breakfast  piperacillin/tazobactam IVPB.. 3.375 Gram(s) IV Intermittent every 12 hours  polyethylene glycol 3350 17 Gram(s) Oral two times a day  senna 2 Tablet(s) Oral at bedtime  tiotropium 2.5 MICROgram(s) Inhaler 2 Puff(s) Inhalation daily    MEDICATIONS  (PRN):  acetaminophen     Tablet .. 650 milliGRAM(s) Oral every 4 hours PRN Mild Pain (1 - 3)    Pertinent Physical Exam  I&O's Summary    24 Feb 2024 07:01  -  25 Feb 2024 07:00  --------------------------------------------------------  IN: 0 mL / OUT: 190 mL / NET: -190 mL        Assessment  89y Male  w/ PAST MEDICAL & SURGICAL HISTORY:  Afib      Congestive heart failure (CHF)      CVA (cerebral vascular accident)      Hypertension, unspecified type      Chronic obstructive pulmonary disease (COPD)      No significant past surgical history      admitted with complaints of Patient is a 89y old  Male who presents with a chief complaint of Transfer for PleurX (24 Feb 2024 14:32)  .  On 2/23/24 L PTC placed for worsening left effusion. Pt w/ COVID, completed remdesivir, on dexamethasone and zosyn, not improving, currently on HFNC. Palliative spoke to family yesterday and pt and family with to be full code. It is difficult to understand pt as he has an accent, in winded, and he has had a stoke, I spoke to primary team to reach out to palliative again      R face/neck

## 2024-09-28 NOTE — PRE-OP CHECKLIST - LAST TOOK
Final diagnoses:   Palpitations   Anxiety     ED Disposition       ED Disposition   Discharge    Condition   Stable    Date/Time   Sat Sep 28, 2024  4:11 PM    Comment   Bianca Tilley discharge to home/self care.                   Assessment & Plan       Medical Decision Making  59-year-old female with history of anxiety, presenting to the emerged department with palpitations.  Differential includes arrhythmia, ACS, pneumonia, pneumothorax, PE.  D-dimer negative.  Low risk for ACS by heart score.  Chest x-ray on my interpretation without pneumonia or pneumothorax.  Low risk for PE.  Likely anxiety versus gastritis.    Amount and/or Complexity of Data Reviewed  Labs: ordered.  Radiology: ordered and independent interpretation performed.    Risk  Prescription drug management.             Medications   sodium chloride 0.9 % bolus 1,000 mL (0 mL Intravenous Stopped 9/28/24 1616)       ED Risk Strat Scores   HEART Risk Score      Flowsheet Row Most Recent Value   Heart Score Risk Calculator    History 0 Filed at: 09/28/2024 1840   ECG 0 Filed at: 09/28/2024 1840   Age 1 Filed at: 09/28/2024 1840   Risk Factors 1 Filed at: 09/28/2024 1840   Troponin 0 Filed at: 09/28/2024 1840   HEART Score 2 Filed at: 09/28/2024 1840                               SBIRT 20yo+      Flowsheet Row Most Recent Value   Initial Alcohol Screen: US AUDIT-C     1. How often do you have a drink containing alcohol? 0 Filed at: 09/28/2024 1519   2. How many drinks containing alcohol do you have on a typical day you are drinking?  0 Filed at: 09/28/2024 1519   3b. FEMALE Any Age, or MALE 65+: How often do you have 4 or more drinks on one occassion? 0 Filed at: 09/28/2024 1519   Audit-C Score 0 Filed at: 09/28/2024 1519   DARLEEN: How many times in the past year have you...    Used an illegal drug or used a prescription medication for non-medical reasons? Never Filed at: 09/28/2024 1519                            History of Present Illness       Chief  Complaint   Patient presents with    Palpitations     Pt c/o palpitations x 2 days. Symptoms worse since onset. Has not noticed any alleviating or aggravating factors. Pt has SOB when palpitations present        Past Medical History:   Diagnosis Date    Anxiety     Cervical radiculopathy     Cervical spinal stenosis     Current smoker     Depression     GERD (gastroesophageal reflux disease)     Hiatal hernia     History of transfusion     Myofascial pain syndrome     Polyarthralgia     TIA (transient ischemic attack)     Tobacco dependence     Upper respiratory tract infection     Vertigo       Past Surgical History:   Procedure Laterality Date    CARPAL TUNNEL RELEASE       SECTION      CHOLECYSTECTOMY      COLONOSCOPY      ECTOPIC PREGNANCY SURGERY      HYSTERECTOMY  2009    Total unilateral removal tube and ovary    LAPAROSCOPY      OOPHORECTOMY      has one ovary    MA BLEPHAROPLASTY LOWER EYELID Bilateral 2020    Procedure: BLEPHAROPLASTY LOWER;  Surgeon: Brian Fisher MD;  Location:  MAIN OR;  Service: Plastics    MA EXCISION GANGLION WRIST DORSAL/VOLAR PRIMARY Left 2020    Procedure: EXCISION GANGLION CYST;  Surgeon: Brian Fisher MD;  Location:  MAIN OR;  Service: Plastics    PUBOVAGINAL SLING      REPAIR RECTOCELE      TONSILECTOMY AND ADNOIDECTOMY      TONSILLECTOMY      UPPER GASTROINTESTINAL ENDOSCOPY        Family History   Problem Relation Age of Onset    Asthma Mother     Hypertension Father     Hepatitis Sister         C and B    No Known Problems Daughter     Other Maternal Grandmother         pre-diabetic    No Known Problems Maternal Grandfather     Diabetes Paternal Grandmother     Diabetes Paternal Grandfather     Ulcers Brother     HIV Brother     No Known Problems Son     No Known Problems Maternal Aunt     No Known Problems Maternal Aunt     No Known Problems Maternal Aunt     No Known Problems Maternal Aunt     No Known Problems Maternal Aunt     No Known  clears Problems Maternal Aunt     No Known Problems Paternal Aunt     No Known Problems Paternal Aunt     Thyroid disease Other     Stroke Other     Breast cancer Neg Hx       Social History     Tobacco Use    Smoking status: Some Days     Current packs/day: 0.25     Types: Cigarettes    Smokeless tobacco: Never    Tobacco comments:     using patch and gum   Vaping Use    Vaping status: Never Used   Substance Use Topics    Alcohol use: No    Drug use: Never      E-Cigarette/Vaping    E-Cigarette Use Never User       E-Cigarette/Vaping Substances    Nicotine No     THC No     CBD No     Flavoring No     Other No     Unknown No       I have reviewed and agree with the history as documented.     59-year-old female with history of anxiety, presenting to the emerged department with palpitations.  Intermittent over the past 2 days.  Associate with epigastric/chest pain.  Also associate with shortness of breath.  No fever or chills.  No cough congestion rhinorrhea.  Also having some dizziness although not currently.          Review of Systems   Constitutional:  Negative for chills and fever.   HENT:  Negative for ear pain and sore throat.    Eyes:  Negative for pain and visual disturbance.   Respiratory:  Positive for shortness of breath. Negative for cough.    Cardiovascular:  Positive for chest pain and palpitations.   Gastrointestinal:  Negative for abdominal pain and vomiting.   Genitourinary:  Negative for dysuria and hematuria.   Musculoskeletal:  Negative for arthralgias and back pain.   Skin:  Negative for color change and rash.   Neurological:  Positive for dizziness. Negative for seizures and syncope.   All other systems reviewed and are negative.          Objective       ED Triage Vitals [09/28/24 1518]   Temperature Pulse Blood Pressure Respirations SpO2 Patient Position - Orthostatic VS   97.5 °F (36.4 °C) 60 (!) 160/103 22 97 % Sitting      Temp Source Heart Rate Source BP Location FiO2 (%) Pain Score    Tympanic  Monitor Right arm -- No Pain      Vitals      Date and Time Temp Pulse SpO2 Resp BP Pain Score FACES Pain Rating User   09/28/24 1617 -- 72 96 % 17 124/71 -- --    09/28/24 1530 -- 68 97 % 17 127/74 -- --    09/28/24 1518 97.5 °F (36.4 °C) 60 97 % 22 160/103 No Pain --             Physical Exam  Vitals and nursing note reviewed.   Constitutional:       General: She is not in acute distress.     Appearance: She is well-developed.   HENT:      Head: Normocephalic and atraumatic.   Eyes:      Conjunctiva/sclera: Conjunctivae normal.   Cardiovascular:      Rate and Rhythm: Normal rate and regular rhythm.      Heart sounds: No murmur heard.  Pulmonary:      Effort: Pulmonary effort is normal. No respiratory distress.      Breath sounds: Normal breath sounds.   Abdominal:      Palpations: Abdomen is soft.      Tenderness: There is no abdominal tenderness.   Musculoskeletal:         General: No swelling.      Cervical back: Neck supple.   Skin:     General: Skin is warm and dry.      Capillary Refill: Capillary refill takes less than 2 seconds.   Neurological:      Mental Status: She is alert.   Psychiatric:         Mood and Affect: Mood normal.         Results Reviewed       Procedure Component Value Units Date/Time    HS Troponin 0hr (reflex protocol) [[id number]]  (Normal) Collected: 09/28/24 1521    Lab Status: Final result Specimen: Blood from Arm, Right Updated: 09/28/24 1558     hs TnI 0hr 3 ng/L     B-Type Natriuretic Peptide(BNP) [[id number]]  (Normal) Collected: 09/28/24 1521    Lab Status: Final result Specimen: Blood from Arm, Right Updated: 09/28/24 1557     BNP 18 pg/mL     Lipase [[id number]]  (Normal) Collected: 09/28/24 1521    Lab Status: Final result Specimen: Blood from Arm, Right Updated: 09/28/24 1554     Lipase 38 u/L     Comprehensive metabolic panel [[id number]] Collected: 09/28/24 1521    Lab Status: Final result Specimen: Blood from Arm, Right Updated: 09/28/24 1554     Sodium 138 mmol/L       Potassium 4.6 mmol/L      Chloride 102 mmol/L      CO2 26 mmol/L      ANION GAP 10 mmol/L      BUN 15 mg/dL      Creatinine 0.74 mg/dL      Glucose 104 mg/dL      Calcium 9.7 mg/dL      AST 21 U/L      ALT 8 U/L      Alkaline Phosphatase 101 U/L      Total Protein 7.7 g/dL      Albumin 4.9 g/dL      Total Bilirubin 0.35 mg/dL      eGFR 88 ml/min/1.73sq m     Narrative:      National Kidney Disease Foundation guidelines for Chronic Kidney Disease (CKD):     Stage 1 with normal or high GFR (GFR > 90 mL/min/1.73 square meters)    Stage 2 Mild CKD (GFR = 60-89 mL/min/1.73 square meters)    Stage 3A Moderate CKD (GFR = 45-59 mL/min/1.73 square meters)    Stage 3B Moderate CKD (GFR = 30-44 mL/min/1.73 square meters)    Stage 4 Severe CKD (GFR = 15-29 mL/min/1.73 square meters)    Stage 5 End Stage CKD (GFR <15 mL/min/1.73 square meters)  Note: GFR calculation is accurate only with a steady state creatinine    Magnesium [[id number]]  (Normal) Collected: 09/28/24 1521    Lab Status: Final result Specimen: Blood from Arm, Right Updated: 09/28/24 1554     Magnesium 2.0 mg/dL     D-Dimer [[id number]]  (Normal) Collected: 09/28/24 1521    Lab Status: Final result Specimen: Blood from Arm, Right Updated: 09/28/24 1547     D-Dimer, Quant 0.27 ug/ml FEU     Narrative:      In the evaluation for possible pulmonary embolism, in the appropriate (Well's Score of 4 or less) patient, the age adjusted d-dimer cutoff for this patient can be calculated as:    Age x 0.01 (in ug/mL) for Age-adjusted D-dimer exclusion threshold for a patient over 50 years.    CBC and differential [[id number]] Collected: 09/28/24 1521    Lab Status: Final result Specimen: Blood from Arm, Right Updated: 09/28/24 1533     WBC 6.85 Thousand/uL      RBC 4.80 Million/uL      Hemoglobin 14.1 g/dL      Hematocrit 44.8 %      MCV 93 fL      MCH 29.4 pg      MCHC 31.5 g/dL      RDW 13.2 %      MPV 9.6 fL      Platelets 176 Thousands/uL      nRBC 0 /100 WBCs      Segmented  % 66 %      Immature Grans % 0 %      Lymphocytes % 27 %      Monocytes % 5 %      Eosinophils Relative 1 %      Basophils Relative 1 %      Absolute Neutrophils 4.57 Thousands/µL      Absolute Immature Grans 0.02 Thousand/uL      Absolute Lymphocytes 1.83 Thousands/µL      Absolute Monocytes 0.35 Thousand/µL      Eosinophils Absolute 0.04 Thousand/µL      Basophils Absolute 0.04 Thousands/µL             XR chest 1 view portable   ED Interpretation by Chanda Conley MD ( [id number])   No acute cardiopulmonary disease        Final Interpretation by Anayeli Mcdonald MD ([id number])      No acute cardiopulmonary disease.            Workstation performed: [id number]             Procedures    ED Medication and Procedure Management   Prior to Admission Medications   Prescriptions Last Dose Informant Patient Reported? Taking?   busPIRone (BUSPAR) 5 mg tablet  Self No No   Sig: Currently 1 tab daily   May bump up if needed to twice a day   cyclobenzaprine (FLEXERIL) 5 mg tablet   No No   Sig: Take 1 tablet (5 mg total) by mouth 3 (three) times a day as needed for muscle spasms   dicyclomine (BENTYL) 20 mg tablet   No No   Sig: Take 1 tablet (20 mg total) by mouth 2 (two) times a day   famotidine (PEPCID) 40 MG tablet   No No   Sig: Take 1 tablet (40 mg total) by mouth daily   fluconazole (DIFLUCAN) 200 mg tablet   Yes No   fluticasone (FLONASE) 50 mcg/act nasal spray   No No   Si spray into each nostril daily   hydrOXYzine HCL (ATARAX) 25 mg tablet   No No   Sig: Take 1 tablet (25 mg total) by mouth every 8 (eight) hours as needed for anxiety   ibuprofen (MOTRIN) 600 mg tablet   No No   Sig: Take 1 tablet (600 mg total) by mouth every 6 (six) hours as needed for mild pain   loperamide (IMODIUM) 2 mg capsule   No No   Sig: Take 1 capsule (2 mg total) by mouth 4 (four) times a day as needed for diarrhea   Patient not taking: Reported on 2024   loratadine (CLARITIN) 10 mg tablet   No No   Sig: Take 1  tablet (10 mg total) by mouth daily   meclizine (ANTIVERT) 25 mg tablet   No No   Sig: Take 1 tablet (25 mg total) by mouth 3 (three) times a day as needed for dizziness   metoclopramide (REGLAN) 10 mg tablet   No No   Sig: Take 1 tablet (10 mg total) by mouth every 8 (eight) hours as needed (vomiting)   Patient not taking: Reported on 8/12/2024   nicotine (NICODERM CQ) 14 mg/24hr TD 24 hr patch   No No   Sig: Place 1 patch on the skin over 24 hours every 24 hours   nicotine polacrilex (NICORETTE) 4 mg gum   No No   Sig: Chew 1 each (4 mg total) as needed for smoking cessation   omeprazole (PriLOSEC) 40 MG capsule   No No   Sig: Take 1 capsule (40 mg total) by mouth daily      Facility-Administered Medications: None     Discharge Medication List as of 9/28/2024  4:12 PM        START taking these medications    Details   ALPRAZolam (XANAX) 0.25 mg tablet Take 1 tablet (0.25 mg total) by mouth 3 (three) times a day as needed for anxiety for up to 10 days, Starting Sat 9/28/2024, Until Tue 10/8/2024 at 2359, Normal           CONTINUE these medications which have NOT CHANGED    Details   busPIRone (BUSPAR) 5 mg tablet Currently 1 tab daily   May bump up if needed to twice a day, No Print      cyclobenzaprine (FLEXERIL) 5 mg tablet Take 1 tablet (5 mg total) by mouth 3 (three) times a day as needed for muscle spasms, Starting Wed 2/7/2024, Normal      dicyclomine (BENTYL) 20 mg tablet Take 1 tablet (20 mg total) by mouth 2 (two) times a day, Starting Sun 4/28/2024, Normal      famotidine (PEPCID) 40 MG tablet Take 1 tablet (40 mg total) by mouth daily, Starting Fri 8/30/2024, Normal      fluconazole (DIFLUCAN) 200 mg tablet Historical Med      fluticasone (FLONASE) 50 mcg/act nasal spray 1 spray into each nostril daily, Starting Wed 9/18/2024, Normal      hydrOXYzine HCL (ATARAX) 25 mg tablet Take 1 tablet (25 mg total) by mouth every 8 (eight) hours as needed for anxiety, Starting Wed 7/24/2024, Normal      ibuprofen  (MOTRIN) 600 mg tablet Take 1 tablet (600 mg total) by mouth every 6 (six) hours as needed for mild pain, Starting Wed 7/24/2024, Normal      loperamide (IMODIUM) 2 mg capsule Take 1 capsule (2 mg total) by mouth 4 (four) times a day as needed for diarrhea, Starting Sun 4/28/2024, Normal      loratadine (CLARITIN) 10 mg tablet Take 1 tablet (10 mg total) by mouth daily, Starting Fri 8/30/2024, Normal      meclizine (ANTIVERT) 25 mg tablet Take 1 tablet (25 mg total) by mouth 3 (three) times a day as needed for dizziness, Starting Wed 6/14/2023, Normal      metoclopramide (REGLAN) 10 mg tablet Take 1 tablet (10 mg total) by mouth every 8 (eight) hours as needed (vomiting), Starting Sun 4/28/2024, Normal      nicotine (NICODERM CQ) 14 mg/24hr TD 24 hr patch Place 1 patch on the skin over 24 hours every 24 hours, Starting Mon 7/29/2024, Normal      nicotine polacrilex (NICORETTE) 4 mg gum Chew 1 each (4 mg total) as needed for smoking cessation, Starting Wed 2/7/2024, Normal      omeprazole (PriLOSEC) 40 MG capsule Take 1 capsule (40 mg total) by mouth daily, Starting Fri 8/30/2024, Normal           No discharge procedures on file.  ED SEPSIS DOCUMENTATION   Time reflects when diagnosis was documented in both MDM as applicable and the Disposition within this note       Time User Action Codes Description Comment    9/28/2024  4:11 PM Chanda Conley [R00.2] Palpitations     9/28/2024  4:11 PM Chanda Conley [F41.9] Anxiety                  Chanda Conley MD  09/28/24 5229

## 2024-11-04 NOTE — DISCHARGE NOTE PROVIDER - NSDCHHNEEDSERVICE_GEN_ALL_CORE
[FreeTextEntry3] : Gaurav Rollins MD Professor, Cardiovascular & Thoracic Surgery [facility] [employer] Director of the [facility]  Director of Thoracic Surgery, [address] [place] [address] Phone: [phone] Fax: [phone] Observation and assessment

## 2024-11-05 NOTE — ED ADULT NURSE NOTE - NSSEPSISSUSPECTED_ED_A_ED
Sore throat and cough    Medications sent to pharmacy.  You will be sent something for cough.    Drink plenty of fluids.  Increase hydration, may do Gatorade or Pedialyte for diarrhea.    Get plenty of rest.     Tylenol or Motrin as needed for fever and pain.  Please return to clinic if symptoms do not resolve in the next week.    Go to the ER with any significant change or worsening of symptoms.     Follow up with your primary care doctor.     
No

## 2024-11-19 ENCOUNTER — NON-APPOINTMENT (OUTPATIENT)
Age: 89
End: 2024-11-19

## 2025-01-10 NOTE — PROGRESS NOTE ADULT - ASSESSMENT
MAMADOU note: Otis of the [facility] liaison is checking with her management team on ability to accept patient, she will let CM know.  Pt had no other SNF choices at this time.   Assessment:  Jaylon Antony is an 89 year old man with past medical history of Atrial fibrillation (on Eliquis), HFrEF (LVEF 25-30% in 2019), Hypertension, Chronic kidney disease and history of CVA presents with acute onset of chest discomfort and shortness of breath, concerning for acute on chronic systolic heart failure exacerbation, also with adenovirus.     ECG consistent with atrial fibrillation and lateral ST depressions which are generally similar to prior ECG. Troponin negative x 2. Pro BNP elevated but less than prior hospitalization. Echo consistent with low normal LVEF 50%, moderate mitral valve stenosis and elevated filling pressures.     Recommendations:  [] Chest discomfort: Due to markedly elevated D-dimer would evaluate for DVT and pulmonary embolism, if negative then start treatment for CHF as per below. Continue to monitor on telemetry.   [] HFrEF: LVEF 50%, moderate mitral stenosis. Plan for IV diuresis pending PE workup above. Continue guideline directed medical therapy; Metoprolol succinate 50 mg daily. Moderate mitral stenosis can be managed with outpatient surveillance echo imaging.   [] Atrial fibrillation: Rate controlled, continue beta blocker. Continue Eliquis for stroke prophylaxis.   [] Adenovirus: Supportive care per primary team    Updated ARMAAN Lockwood. We will continue to follow along.     Kevin Hong MD  Cardiology

## 2025-03-12 NOTE — ED ADULT NURSE NOTE - ALCOHOL PRE SCREEN (AUDIT - C)
Behavioral Health Belongings     Informed of Valuables Policy  Yes No         Item                  Qty   Clothing:     Home   Bin   Security Lock Up Room Returned   Date/Initials   1 Sweater [place]                                                        Signature at Update ______________________________________________    Date: __________________________                  Behavioral Health Belongings    Update: A pair of shoes was brought in today & was given to patient. MG2 3/13/25     Informed of Valuables Policy  Yes No         Item                  Qty   Clothing:     Home   Bin   Security Lock Up Room Returned   Date/Initials   x  SweatPants   x      x2 Jeans  x      x3 Socks (pair)    x    x3 Long sleeve shirt (blue, gray, bears shirt)  x      X3 Boxer briefs  x                       Qty   Cards, Keys, Valuables:   Home   Bin   Security Lock Up Room Returned Date/Initials   x Watch    x                                                                                Signature at Admission ___________________________________________Date: _________    Signature at Update ______________________________________________/Date: 3/13/25    Signature at Discharge ____________________________________________Date: _________          ///   Statement Selected

## 2025-06-19 NOTE — PROGRESS NOTE ADULT - SUBJECTIVE AND OBJECTIVE BOX
Patient is a 88y old  Male who presents with a chief complaint of shortness of breath (25 Dec 2022 07:45)      24 HOUR EVENTS:  No overnight events reported.     SUBJECTIVE:  Patient seen and examined at bedside. No specific complaints - has nasal congestion. Still not sleeping well. Not able to say if he feels better today or not. He still feels "sick."    ALLERGIES:  No Known Allergies    MEDICATIONS  (STANDING):  albuterol/ipratropium for Nebulization 3 milliLiter(s) Nebulizer every 6 hours  amLODIPine   Tablet 10 milliGRAM(s) Oral daily  apixaban 2.5 milliGRAM(s) Oral every 12 hours  aspirin enteric coated 81 milliGRAM(s) Oral daily  benzonatate 200 milliGRAM(s) Oral three times a day  budesonide  80 MICROgram(s)/formoterol 4.5 MICROgram(s) Inhaler 2 Puff(s) Inhalation two times a day  guaiFENesin  milliGRAM(s) Oral every 12 hours  hydrALAZINE 25 milliGRAM(s) Oral every 8 hours  hydrocodone/homatropine Syrup 5 milliLiter(s) Oral at bedtime  metoprolol succinate ER 50 milliGRAM(s) Oral daily  senna 2 Tablet(s) Oral at bedtime  sodium chloride 0.65% Nasal 1 Spray(s) Both Nostrils every 6 hours    MEDICATIONS  (PRN):  hydrALAZINE Injectable 10 milliGRAM(s) IV Push every 2 hours PRN sbp >17mm/hg    Vital Signs Last 24 Hrs  T(F): 97.8 (25 Dec 2022 05:07), Max: 98.1 (24 Dec 2022 20:54)  HR: 88 (25 Dec 2022 05:07) (74 - 91)  BP: 131/73 (25 Dec 2022 05:07) (124/54 - 133/64)  RR: 16 (25 Dec 2022 05:07) (16 - 18)  SpO2: 95% (25 Dec 2022 05:07) (95% - 96%)  I&O's Summary    24 Dec 2022 07:01  -  25 Dec 2022 07:00  --------------------------------------------------------  IN: 800 mL / OUT: 400 mL / NET: 400 mL      PHYSICAL EXAM:  General: NAD, Awake and alert  ENT: Moist mucous membranes, no thrush  Neck: Supple, No JVD  Lungs: Clear to auscultation bilaterally, good air entry, non-labored breathing, + cough and congested sounding  Cardio: RRR, S1/S2, No murmur  Abdomen: Soft, Nontender, Nondistended; Bowel sounds present  Extremities: No calf tenderness, No pitting edema, no contractures.    LABS:                        12.5   13.90 )-----------( 250      ( 25 Dec 2022 05:45 )             38.3     12-25    140  |  102  |  58  ----------------------------<  124  3.8   |  29  |  1.76    Ca    8.8      25 Dec 2022 05:45  Phos  2.5     12-23  Mg     2.2     12-23                Procalcitonin, Serum: 0.32 ng/mL (12-24-22 @ 06:00)  Procalcitonin, Serum: 0.28 ng/mL (12-23-22 @ 16:15)  Procalcitonin, Serum: 0.25 ng/mL (12-23-22 @ 06:35)                                RADIOLOGY & ADDITIONAL TESTS:    Care Discussed with Consultants/Other Providers:    Yes

## 2025-06-24 NOTE — DISCHARGE NOTE NURSING/CASE MANAGEMENT/SOCIAL WORK - NSDCPEELIQUISDIET_GEN_ALL_CORE
Eat healthy foods you enjoy. Apixaban/Eliquis DOES NOT have a special diet. Limit your alcohol intake. Statement Selected